# Patient Record
Sex: FEMALE | Race: WHITE | NOT HISPANIC OR LATINO | URBAN - METROPOLITAN AREA
[De-identification: names, ages, dates, MRNs, and addresses within clinical notes are randomized per-mention and may not be internally consistent; named-entity substitution may affect disease eponyms.]

---

## 2017-06-14 ENCOUNTER — OUTPATIENT (OUTPATIENT)
Dept: OUTPATIENT SERVICES | Facility: HOSPITAL | Age: 72
LOS: 1 days | Discharge: HOME | End: 2017-06-14

## 2017-06-14 DIAGNOSIS — M81.0 AGE-RELATED OSTEOPOROSIS WITHOUT CURRENT PATHOLOGICAL FRACTURE: ICD-10-CM

## 2017-06-14 DIAGNOSIS — R53.83 OTHER FATIGUE: ICD-10-CM

## 2017-06-14 DIAGNOSIS — J45.909 UNSPECIFIED ASTHMA, UNCOMPLICATED: ICD-10-CM

## 2017-06-14 DIAGNOSIS — S30.1XXA CONTUSION OF ABDOMINAL WALL, INITIAL ENCOUNTER: ICD-10-CM

## 2017-06-14 DIAGNOSIS — E78.5 HYPERLIPIDEMIA, UNSPECIFIED: ICD-10-CM

## 2017-06-14 DIAGNOSIS — K75.4 AUTOIMMUNE HEPATITIS: ICD-10-CM

## 2017-06-22 ENCOUNTER — EMERGENCY (EMERGENCY)
Facility: HOSPITAL | Age: 72
LOS: 0 days | Discharge: HOME | End: 2017-06-22
Admitting: INTERNAL MEDICINE

## 2017-06-22 DIAGNOSIS — J45.909 UNSPECIFIED ASTHMA, UNCOMPLICATED: ICD-10-CM

## 2017-06-22 DIAGNOSIS — S81.002A UNSPECIFIED OPEN WOUND, LEFT KNEE, INITIAL ENCOUNTER: ICD-10-CM

## 2017-06-22 DIAGNOSIS — Z23 ENCOUNTER FOR IMMUNIZATION: ICD-10-CM

## 2017-06-22 DIAGNOSIS — M25.511 PAIN IN RIGHT SHOULDER: ICD-10-CM

## 2017-06-22 DIAGNOSIS — S09.90XA UNSPECIFIED INJURY OF HEAD, INITIAL ENCOUNTER: ICD-10-CM

## 2017-06-22 DIAGNOSIS — S00.31XA ABRASION OF NOSE, INITIAL ENCOUNTER: ICD-10-CM

## 2017-06-22 DIAGNOSIS — S81.011A LACERATION WITHOUT FOREIGN BODY, RIGHT KNEE, INITIAL ENCOUNTER: ICD-10-CM
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
## 2017-06-22 DIAGNOSIS — E78.00 PURE HYPERCHOLESTEROLEMIA, UNSPECIFIED: ICD-10-CM

## 2017-06-22 DIAGNOSIS — K75.4 AUTOIMMUNE HEPATITIS: ICD-10-CM

## 2017-06-22 DIAGNOSIS — R53.83 OTHER FATIGUE: ICD-10-CM

## 2017-06-22 DIAGNOSIS — E78.5 HYPERLIPIDEMIA, UNSPECIFIED: ICD-10-CM
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
## 2017-06-22 DIAGNOSIS — Z79.899 OTHER LONG TERM (CURRENT) DRUG THERAPY: ICD-10-CM

## 2017-06-22 DIAGNOSIS — M81.0 AGE-RELATED OSTEOPOROSIS WITHOUT CURRENT PATHOLOGICAL FRACTURE: ICD-10-CM

## 2017-06-22 DIAGNOSIS — S30.1XXA CONTUSION OF ABDOMINAL WALL, INITIAL ENCOUNTER: ICD-10-CM

## 2017-06-27 ENCOUNTER — APPOINTMENT (OUTPATIENT)
Dept: HEMATOLOGY ONCOLOGY | Facility: CLINIC | Age: 72
End: 2017-06-27

## 2017-06-27 ENCOUNTER — OUTPATIENT (OUTPATIENT)
Dept: OUTPATIENT SERVICES | Facility: HOSPITAL | Age: 72
LOS: 1 days | Discharge: HOME | End: 2017-06-27

## 2017-06-27 VITALS
HEIGHT: 63 IN | TEMPERATURE: 96 F | DIASTOLIC BLOOD PRESSURE: 106 MMHG | WEIGHT: 176 LBS | HEART RATE: 98 BPM | SYSTOLIC BLOOD PRESSURE: 164 MMHG | RESPIRATION RATE: 12 BRPM | BODY MASS INDEX: 31.18 KG/M2

## 2017-06-27 DIAGNOSIS — M81.0 AGE-RELATED OSTEOPOROSIS WITHOUT CURRENT PATHOLOGICAL FRACTURE: ICD-10-CM

## 2017-06-27 DIAGNOSIS — J45.909 UNSPECIFIED ASTHMA, UNCOMPLICATED: ICD-10-CM

## 2017-06-27 DIAGNOSIS — R53.83 OTHER FATIGUE: ICD-10-CM

## 2017-06-27 DIAGNOSIS — E78.5 HYPERLIPIDEMIA, UNSPECIFIED: ICD-10-CM

## 2017-06-27 DIAGNOSIS — S30.1XXA CONTUSION OF ABDOMINAL WALL, INITIAL ENCOUNTER: ICD-10-CM

## 2017-06-27 DIAGNOSIS — Z86.19 PERSONAL HISTORY OF OTHER INFECTIOUS AND PARASITIC DISEASES: ICD-10-CM

## 2017-06-27 DIAGNOSIS — K75.4 AUTOIMMUNE HEPATITIS: ICD-10-CM

## 2017-06-27 LAB
BASOPHILS # BLD: 0.04 TH/MM3
BASOPHILS NFR BLD: 0.3 %
EOSINOPHIL # BLD: 0.01 TH/MM3
EOSINOPHIL NFR BLD: 0.1 %
ERYTHROCYTE [DISTWIDTH] IN BLOOD BY AUTOMATED COUNT: 16.2 %
GRANULOCYTES # BLD: 11.24 TH/MM3
GRANULOCYTES NFR BLD: 83.3 %
HCT VFR BLD AUTO: 40.1 %
HGB BLD-MCNC: 13.1 G/DL
IMM GRANULOCYTES # BLD: 0.35 TH/MM3
IMM GRANULOCYTES NFR BLD: 2.6 %
LYMPHOCYTES # BLD: 1.32 TH/MM3
LYMPHOCYTES NFR BLD: 9.8 %
MCH RBC QN AUTO: 29 PG
MCHC RBC AUTO-ENTMCNC: 32.7 G/DL
MCV RBC AUTO: 88.7 FL
MONOCYTES # BLD: 0.52 TH/MM3
MONOCYTES NFR BLD: 3.9 %
PLATELET # BLD: 304 TH/MM3
PMV BLD AUTO: 9.2 FL
RBC # BLD AUTO: 4.52 MIL/MM3
WBC # BLD: 13.48 TH/MM3

## 2017-06-27 RX ORDER — WARFARIN 2 MG/1
2 TABLET ORAL
Qty: 30 | Refills: 0 | Status: ACTIVE | COMMUNITY
Start: 2017-04-11

## 2017-06-27 RX ORDER — PANTOPRAZOLE 40 MG/1
40 TABLET, DELAYED RELEASE ORAL
Qty: 90 | Refills: 0 | Status: ACTIVE | COMMUNITY
Start: 2017-05-18

## 2017-06-27 RX ORDER — PREDNISONE 20 MG/1
20 TABLET ORAL
Qty: 6 | Refills: 0 | Status: ACTIVE | COMMUNITY
Start: 2017-03-04

## 2017-06-27 RX ORDER — FUROSEMIDE 20 MG/1
20 TABLET ORAL
Qty: 30 | Refills: 0 | Status: ACTIVE | COMMUNITY
Start: 2017-04-19

## 2017-06-27 RX ORDER — AMLODIPINE BESYLATE 2.5 MG/1
2.5 TABLET ORAL
Qty: 30 | Refills: 0 | Status: ACTIVE | COMMUNITY
Start: 2017-04-11

## 2017-06-27 RX ORDER — BUDESONIDE 3 MG/1
3 CAPSULE, COATED PELLETS ORAL
Qty: 120 | Refills: 0 | Status: ACTIVE | COMMUNITY
Start: 2017-02-24

## 2017-06-27 RX ORDER — BUDESONIDE 180 UG/1
180 AEROSOL, POWDER RESPIRATORY (INHALATION)
Qty: 1 | Refills: 0 | Status: ACTIVE | COMMUNITY
Start: 2017-03-04

## 2017-06-28 DIAGNOSIS — Z79.01 LONG TERM (CURRENT) USE OF ANTICOAGULANTS: ICD-10-CM

## 2017-06-28 DIAGNOSIS — I27.82 CHRONIC PULMONARY EMBOLISM: ICD-10-CM

## 2017-06-30 ENCOUNTER — OUTPATIENT (OUTPATIENT)
Dept: OUTPATIENT SERVICES | Facility: HOSPITAL | Age: 72
LOS: 1 days | Discharge: HOME | End: 2017-06-30

## 2017-06-30 DIAGNOSIS — M81.0 AGE-RELATED OSTEOPOROSIS WITHOUT CURRENT PATHOLOGICAL FRACTURE: ICD-10-CM

## 2017-06-30 DIAGNOSIS — Z79.01 LONG TERM (CURRENT) USE OF ANTICOAGULANTS: ICD-10-CM

## 2017-06-30 DIAGNOSIS — I27.82 CHRONIC PULMONARY EMBOLISM: ICD-10-CM

## 2017-06-30 DIAGNOSIS — R53.83 OTHER FATIGUE: ICD-10-CM

## 2017-06-30 DIAGNOSIS — S30.1XXA CONTUSION OF ABDOMINAL WALL, INITIAL ENCOUNTER: ICD-10-CM

## 2017-06-30 DIAGNOSIS — E78.5 HYPERLIPIDEMIA, UNSPECIFIED: ICD-10-CM

## 2017-06-30 DIAGNOSIS — K75.4 AUTOIMMUNE HEPATITIS: ICD-10-CM

## 2017-06-30 DIAGNOSIS — J45.909 UNSPECIFIED ASTHMA, UNCOMPLICATED: ICD-10-CM

## 2017-07-12 LAB
ALBUMIN SERPL-MCNC: 3.5 G/DL
ALBUMIN/GLOB SERPL: 1.46
ALP SERPL-CCNC: 100 IU/L
ALT SERPL-CCNC: 38 IU/L
ANION GAP SERPL CALC-SCNC: 10 MEQ/L
AST SERPL-CCNC: 28 IU/L
BILIRUB SERPL-MCNC: 0.9 MG/DL
BUN SERPL-MCNC: 23 MG/DL
BUN/CREAT SERPL: 20 %
CALCIUM SERPL-MCNC: 9.3 MG/DL
CHLORIDE SERPL-SCNC: 102 MEQ/L
CO2 SERPL-SCNC: 31 MEQ/L
CREAT SERPL-MCNC: 1.15 MG/DL
FIBRINOGEN PPP-MCNC: 635 MG/DL
GFR SERPL CREATININE-BSD FRML MDRD: 46
GLUCOSE SERPL-MCNC: 113 MG/DL
INR PPP: 2.9
INTERP & REPORT- F5LEI (NORTH): NORMAL
INTERPRETATION AND REPORT- PF2 (NORTH): NORMAL
POTASSIUM SERPL-SCNC: 3.9 MMOL/L
PROT SERPL-MCNC: 5.9 G/DL
PROTHROMBIN TIME: 31.2 SEC
SODIUM SERPL-SCNC: 143 MEQ/L
THROMBIN TIME: 17.9 SEC

## 2017-07-14 ENCOUNTER — OUTPATIENT (OUTPATIENT)
Dept: OUTPATIENT SERVICES | Facility: HOSPITAL | Age: 72
LOS: 1 days | Discharge: HOME | End: 2017-07-14

## 2017-07-14 DIAGNOSIS — M81.0 AGE-RELATED OSTEOPOROSIS WITHOUT CURRENT PATHOLOGICAL FRACTURE: ICD-10-CM

## 2017-07-14 DIAGNOSIS — S30.1XXA CONTUSION OF ABDOMINAL WALL, INITIAL ENCOUNTER: ICD-10-CM

## 2017-07-14 DIAGNOSIS — K75.4 AUTOIMMUNE HEPATITIS: ICD-10-CM

## 2017-07-14 DIAGNOSIS — I27.82 CHRONIC PULMONARY EMBOLISM: ICD-10-CM

## 2017-07-14 DIAGNOSIS — Z79.01 LONG TERM (CURRENT) USE OF ANTICOAGULANTS: ICD-10-CM

## 2017-07-14 DIAGNOSIS — J45.909 UNSPECIFIED ASTHMA, UNCOMPLICATED: ICD-10-CM

## 2017-07-14 DIAGNOSIS — R53.83 OTHER FATIGUE: ICD-10-CM

## 2017-07-14 DIAGNOSIS — E78.5 HYPERLIPIDEMIA, UNSPECIFIED: ICD-10-CM

## 2017-07-27 ENCOUNTER — OUTPATIENT (OUTPATIENT)
Dept: OUTPATIENT SERVICES | Facility: HOSPITAL | Age: 72
LOS: 1 days | Discharge: HOME | End: 2017-07-27

## 2017-07-27 ENCOUNTER — APPOINTMENT (OUTPATIENT)
Age: 72
End: 2017-07-27

## 2017-07-27 DIAGNOSIS — M81.0 AGE-RELATED OSTEOPOROSIS WITHOUT CURRENT PATHOLOGICAL FRACTURE: ICD-10-CM

## 2017-07-27 DIAGNOSIS — E78.5 HYPERLIPIDEMIA, UNSPECIFIED: ICD-10-CM

## 2017-07-27 DIAGNOSIS — R53.83 OTHER FATIGUE: ICD-10-CM

## 2017-07-27 DIAGNOSIS — S30.1XXA CONTUSION OF ABDOMINAL WALL, INITIAL ENCOUNTER: ICD-10-CM

## 2017-07-27 DIAGNOSIS — J45.909 UNSPECIFIED ASTHMA, UNCOMPLICATED: ICD-10-CM

## 2017-07-27 DIAGNOSIS — K75.4 AUTOIMMUNE HEPATITIS: ICD-10-CM

## 2017-07-27 RX ORDER — SILVER SULFADIAZINE 10 MG/G
1 CREAM TOPICAL TWICE DAILY
Qty: 400 | Refills: 0 | Status: ACTIVE | COMMUNITY
Start: 2017-07-27 | End: 1900-01-01

## 2017-07-28 ENCOUNTER — OUTPATIENT (OUTPATIENT)
Dept: OUTPATIENT SERVICES | Facility: HOSPITAL | Age: 72
LOS: 1 days | Discharge: HOME | End: 2017-07-28

## 2017-07-28 DIAGNOSIS — K75.4 AUTOIMMUNE HEPATITIS: ICD-10-CM

## 2017-07-28 DIAGNOSIS — M81.0 AGE-RELATED OSTEOPOROSIS WITHOUT CURRENT PATHOLOGICAL FRACTURE: ICD-10-CM

## 2017-07-28 DIAGNOSIS — I27.82 CHRONIC PULMONARY EMBOLISM: ICD-10-CM

## 2017-07-28 DIAGNOSIS — R53.83 OTHER FATIGUE: ICD-10-CM

## 2017-07-28 DIAGNOSIS — Z79.01 LONG TERM (CURRENT) USE OF ANTICOAGULANTS: ICD-10-CM

## 2017-07-28 DIAGNOSIS — E78.5 HYPERLIPIDEMIA, UNSPECIFIED: ICD-10-CM

## 2017-07-28 DIAGNOSIS — J45.909 UNSPECIFIED ASTHMA, UNCOMPLICATED: ICD-10-CM

## 2017-07-28 DIAGNOSIS — S30.1XXA CONTUSION OF ABDOMINAL WALL, INITIAL ENCOUNTER: ICD-10-CM
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
## 2017-08-09 DIAGNOSIS — S81.802A UNSPECIFIED OPEN WOUND, LEFT LOWER LEG, INITIAL ENCOUNTER: ICD-10-CM
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
## 2017-08-10 ENCOUNTER — APPOINTMENT (OUTPATIENT)
Age: 72
End: 2017-08-10

## 2017-08-10 ENCOUNTER — OUTPATIENT (OUTPATIENT)
Dept: OUTPATIENT SERVICES | Facility: HOSPITAL | Age: 72
LOS: 1 days | Discharge: HOME | End: 2017-08-10

## 2017-08-10 DIAGNOSIS — S30.1XXA CONTUSION OF ABDOMINAL WALL, INITIAL ENCOUNTER: ICD-10-CM

## 2017-08-10 DIAGNOSIS — M81.0 AGE-RELATED OSTEOPOROSIS WITHOUT CURRENT PATHOLOGICAL FRACTURE: ICD-10-CM

## 2017-08-10 DIAGNOSIS — E78.5 HYPERLIPIDEMIA, UNSPECIFIED: ICD-10-CM

## 2017-08-10 DIAGNOSIS — R53.83 OTHER FATIGUE: ICD-10-CM

## 2017-08-10 DIAGNOSIS — K75.4 AUTOIMMUNE HEPATITIS: ICD-10-CM

## 2017-08-10 DIAGNOSIS — J45.909 UNSPECIFIED ASTHMA, UNCOMPLICATED: ICD-10-CM

## 2017-08-11 ENCOUNTER — OUTPATIENT (OUTPATIENT)
Dept: OUTPATIENT SERVICES | Facility: HOSPITAL | Age: 72
LOS: 1 days | Discharge: HOME | End: 2017-08-11

## 2017-08-11 DIAGNOSIS — E78.5 HYPERLIPIDEMIA, UNSPECIFIED: ICD-10-CM

## 2017-08-11 DIAGNOSIS — I27.82 CHRONIC PULMONARY EMBOLISM: ICD-10-CM

## 2017-08-11 DIAGNOSIS — K75.4 AUTOIMMUNE HEPATITIS: ICD-10-CM

## 2017-08-11 DIAGNOSIS — R53.83 OTHER FATIGUE: ICD-10-CM

## 2017-08-11 DIAGNOSIS — Z79.01 LONG TERM (CURRENT) USE OF ANTICOAGULANTS: ICD-10-CM

## 2017-08-11 DIAGNOSIS — M81.0 AGE-RELATED OSTEOPOROSIS WITHOUT CURRENT PATHOLOGICAL FRACTURE: ICD-10-CM

## 2017-08-11 DIAGNOSIS — S30.1XXA CONTUSION OF ABDOMINAL WALL, INITIAL ENCOUNTER: ICD-10-CM

## 2017-08-11 DIAGNOSIS — J45.909 UNSPECIFIED ASTHMA, UNCOMPLICATED: ICD-10-CM

## 2017-08-17 ENCOUNTER — APPOINTMENT (OUTPATIENT)
Age: 72
End: 2017-08-17

## 2017-08-18 DIAGNOSIS — S81.802A UNSPECIFIED OPEN WOUND, LEFT LOWER LEG, INITIAL ENCOUNTER: ICD-10-CM
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
## 2017-08-25 ENCOUNTER — OUTPATIENT (OUTPATIENT)
Dept: OUTPATIENT SERVICES | Facility: HOSPITAL | Age: 72
LOS: 1 days | Discharge: HOME | End: 2017-08-25

## 2017-08-25 DIAGNOSIS — Z79.01 LONG TERM (CURRENT) USE OF ANTICOAGULANTS: ICD-10-CM

## 2017-08-25 DIAGNOSIS — R53.83 OTHER FATIGUE: ICD-10-CM

## 2017-08-25 DIAGNOSIS — K75.4 AUTOIMMUNE HEPATITIS: ICD-10-CM

## 2017-08-25 DIAGNOSIS — I27.82 CHRONIC PULMONARY EMBOLISM: ICD-10-CM

## 2017-08-25 DIAGNOSIS — E78.5 HYPERLIPIDEMIA, UNSPECIFIED: ICD-10-CM

## 2017-08-25 DIAGNOSIS — J45.909 UNSPECIFIED ASTHMA, UNCOMPLICATED: ICD-10-CM

## 2017-08-25 DIAGNOSIS — S30.1XXA CONTUSION OF ABDOMINAL WALL, INITIAL ENCOUNTER: ICD-10-CM

## 2017-08-25 DIAGNOSIS — M81.0 AGE-RELATED OSTEOPOROSIS WITHOUT CURRENT PATHOLOGICAL FRACTURE: ICD-10-CM

## 2017-09-07 ENCOUNTER — APPOINTMENT (OUTPATIENT)
Dept: HEMATOLOGY ONCOLOGY | Facility: CLINIC | Age: 72
End: 2017-09-07

## 2017-09-07 ENCOUNTER — OUTPATIENT (OUTPATIENT)
Dept: OUTPATIENT SERVICES | Facility: HOSPITAL | Age: 72
LOS: 1 days | Discharge: HOME | End: 2017-09-07

## 2017-09-07 VITALS
DIASTOLIC BLOOD PRESSURE: 92 MMHG | HEIGHT: 63 IN | WEIGHT: 180 LBS | SYSTOLIC BLOOD PRESSURE: 178 MMHG | BODY MASS INDEX: 31.89 KG/M2 | RESPIRATION RATE: 12 BRPM | TEMPERATURE: 97.3 F

## 2017-09-07 DIAGNOSIS — D68.69 OTHER THROMBOPHILIA: ICD-10-CM

## 2017-09-08 ENCOUNTER — OUTPATIENT (OUTPATIENT)
Dept: OUTPATIENT SERVICES | Facility: HOSPITAL | Age: 72
LOS: 1 days | Discharge: HOME | End: 2017-09-08

## 2017-09-08 DIAGNOSIS — E78.5 HYPERLIPIDEMIA, UNSPECIFIED: ICD-10-CM

## 2017-09-08 DIAGNOSIS — K75.4 AUTOIMMUNE HEPATITIS: ICD-10-CM

## 2017-09-08 DIAGNOSIS — Z79.01 LONG TERM (CURRENT) USE OF ANTICOAGULANTS: ICD-10-CM

## 2017-09-08 DIAGNOSIS — R53.83 OTHER FATIGUE: ICD-10-CM

## 2017-09-08 DIAGNOSIS — M81.0 AGE-RELATED OSTEOPOROSIS WITHOUT CURRENT PATHOLOGICAL FRACTURE: ICD-10-CM

## 2017-09-08 DIAGNOSIS — I27.82 CHRONIC PULMONARY EMBOLISM: ICD-10-CM

## 2017-09-08 DIAGNOSIS — S30.1XXA CONTUSION OF ABDOMINAL WALL, INITIAL ENCOUNTER: ICD-10-CM

## 2017-09-08 DIAGNOSIS — J45.909 UNSPECIFIED ASTHMA, UNCOMPLICATED: ICD-10-CM

## 2017-09-25 ENCOUNTER — OUTPATIENT (OUTPATIENT)
Dept: OUTPATIENT SERVICES | Facility: HOSPITAL | Age: 72
LOS: 1 days | Discharge: HOME | End: 2017-09-25

## 2017-09-25 DIAGNOSIS — J45.909 UNSPECIFIED ASTHMA, UNCOMPLICATED: ICD-10-CM

## 2017-09-25 DIAGNOSIS — Z79.01 LONG TERM (CURRENT) USE OF ANTICOAGULANTS: ICD-10-CM

## 2017-09-25 DIAGNOSIS — I27.82 CHRONIC PULMONARY EMBOLISM: ICD-10-CM

## 2017-09-25 DIAGNOSIS — K75.4 AUTOIMMUNE HEPATITIS: ICD-10-CM

## 2017-09-25 DIAGNOSIS — R53.83 OTHER FATIGUE: ICD-10-CM

## 2017-09-25 DIAGNOSIS — E78.5 HYPERLIPIDEMIA, UNSPECIFIED: ICD-10-CM

## 2017-09-25 DIAGNOSIS — M81.0 AGE-RELATED OSTEOPOROSIS WITHOUT CURRENT PATHOLOGICAL FRACTURE: ICD-10-CM

## 2017-09-25 DIAGNOSIS — S30.1XXA CONTUSION OF ABDOMINAL WALL, INITIAL ENCOUNTER: ICD-10-CM

## 2017-10-02 ENCOUNTER — OUTPATIENT (OUTPATIENT)
Dept: OUTPATIENT SERVICES | Facility: HOSPITAL | Age: 72
LOS: 1 days | Discharge: HOME | End: 2017-10-02

## 2017-10-02 DIAGNOSIS — E78.5 HYPERLIPIDEMIA, UNSPECIFIED: ICD-10-CM

## 2017-10-02 DIAGNOSIS — S30.1XXA CONTUSION OF ABDOMINAL WALL, INITIAL ENCOUNTER: ICD-10-CM

## 2017-10-02 DIAGNOSIS — M81.0 AGE-RELATED OSTEOPOROSIS WITHOUT CURRENT PATHOLOGICAL FRACTURE: ICD-10-CM

## 2017-10-02 DIAGNOSIS — J45.909 UNSPECIFIED ASTHMA, UNCOMPLICATED: ICD-10-CM

## 2017-10-02 DIAGNOSIS — K75.4 AUTOIMMUNE HEPATITIS: ICD-10-CM

## 2017-10-02 DIAGNOSIS — R53.83 OTHER FATIGUE: ICD-10-CM

## 2017-10-02 DIAGNOSIS — Z79.01 LONG TERM (CURRENT) USE OF ANTICOAGULANTS: ICD-10-CM

## 2017-10-02 DIAGNOSIS — I27.82 CHRONIC PULMONARY EMBOLISM: ICD-10-CM

## 2017-10-10 ENCOUNTER — OUTPATIENT (OUTPATIENT)
Dept: OUTPATIENT SERVICES | Facility: HOSPITAL | Age: 72
LOS: 1 days | Discharge: HOME | End: 2017-10-10

## 2017-10-10 DIAGNOSIS — K75.4 AUTOIMMUNE HEPATITIS: ICD-10-CM

## 2017-10-10 DIAGNOSIS — I27.82 CHRONIC PULMONARY EMBOLISM: ICD-10-CM

## 2017-10-10 DIAGNOSIS — R53.83 OTHER FATIGUE: ICD-10-CM

## 2017-10-10 DIAGNOSIS — E78.5 HYPERLIPIDEMIA, UNSPECIFIED: ICD-10-CM

## 2017-10-10 DIAGNOSIS — J45.909 UNSPECIFIED ASTHMA, UNCOMPLICATED: ICD-10-CM

## 2017-10-10 DIAGNOSIS — M81.0 AGE-RELATED OSTEOPOROSIS WITHOUT CURRENT PATHOLOGICAL FRACTURE: ICD-10-CM

## 2017-10-10 DIAGNOSIS — S30.1XXA CONTUSION OF ABDOMINAL WALL, INITIAL ENCOUNTER: ICD-10-CM

## 2017-10-10 DIAGNOSIS — Z79.01 LONG TERM (CURRENT) USE OF ANTICOAGULANTS: ICD-10-CM

## 2017-10-18 ENCOUNTER — OUTPATIENT (OUTPATIENT)
Dept: OUTPATIENT SERVICES | Facility: HOSPITAL | Age: 72
LOS: 1 days | Discharge: HOME | End: 2017-10-18

## 2017-10-18 DIAGNOSIS — I27.82 CHRONIC PULMONARY EMBOLISM: ICD-10-CM

## 2017-10-18 DIAGNOSIS — M81.0 AGE-RELATED OSTEOPOROSIS WITHOUT CURRENT PATHOLOGICAL FRACTURE: ICD-10-CM

## 2017-10-18 DIAGNOSIS — E78.5 HYPERLIPIDEMIA, UNSPECIFIED: ICD-10-CM

## 2017-10-18 DIAGNOSIS — S30.1XXA CONTUSION OF ABDOMINAL WALL, INITIAL ENCOUNTER: ICD-10-CM

## 2017-10-18 DIAGNOSIS — J45.909 UNSPECIFIED ASTHMA, UNCOMPLICATED: ICD-10-CM

## 2017-10-18 DIAGNOSIS — Z79.01 LONG TERM (CURRENT) USE OF ANTICOAGULANTS: ICD-10-CM

## 2017-10-18 DIAGNOSIS — R53.83 OTHER FATIGUE: ICD-10-CM

## 2017-10-18 DIAGNOSIS — K75.4 AUTOIMMUNE HEPATITIS: ICD-10-CM

## 2017-10-26 ENCOUNTER — OUTPATIENT (OUTPATIENT)
Dept: OUTPATIENT SERVICES | Facility: HOSPITAL | Age: 72
LOS: 1 days | Discharge: HOME | End: 2017-10-26

## 2017-10-26 DIAGNOSIS — M81.0 AGE-RELATED OSTEOPOROSIS WITHOUT CURRENT PATHOLOGICAL FRACTURE: ICD-10-CM

## 2017-10-26 DIAGNOSIS — Z79.01 LONG TERM (CURRENT) USE OF ANTICOAGULANTS: ICD-10-CM

## 2017-10-26 DIAGNOSIS — I27.82 CHRONIC PULMONARY EMBOLISM: ICD-10-CM

## 2017-10-26 DIAGNOSIS — K75.4 AUTOIMMUNE HEPATITIS: ICD-10-CM

## 2017-10-26 DIAGNOSIS — J45.909 UNSPECIFIED ASTHMA, UNCOMPLICATED: ICD-10-CM

## 2017-10-26 DIAGNOSIS — S30.1XXA CONTUSION OF ABDOMINAL WALL, INITIAL ENCOUNTER: ICD-10-CM

## 2017-10-26 DIAGNOSIS — E78.5 HYPERLIPIDEMIA, UNSPECIFIED: ICD-10-CM

## 2017-10-26 DIAGNOSIS — R53.83 OTHER FATIGUE: ICD-10-CM

## 2017-11-02 ENCOUNTER — OUTPATIENT (OUTPATIENT)
Dept: OUTPATIENT SERVICES | Facility: HOSPITAL | Age: 72
LOS: 1 days | Discharge: HOME | End: 2017-11-02

## 2017-11-02 DIAGNOSIS — K75.4 AUTOIMMUNE HEPATITIS: ICD-10-CM

## 2017-11-02 DIAGNOSIS — I27.82 CHRONIC PULMONARY EMBOLISM: ICD-10-CM

## 2017-11-02 DIAGNOSIS — Z79.01 LONG TERM (CURRENT) USE OF ANTICOAGULANTS: ICD-10-CM

## 2017-11-02 DIAGNOSIS — R53.83 OTHER FATIGUE: ICD-10-CM

## 2017-11-02 DIAGNOSIS — S30.1XXA CONTUSION OF ABDOMINAL WALL, INITIAL ENCOUNTER: ICD-10-CM

## 2017-11-02 DIAGNOSIS — J45.909 UNSPECIFIED ASTHMA, UNCOMPLICATED: ICD-10-CM

## 2017-11-02 DIAGNOSIS — E78.5 HYPERLIPIDEMIA, UNSPECIFIED: ICD-10-CM

## 2017-11-02 DIAGNOSIS — M81.0 AGE-RELATED OSTEOPOROSIS WITHOUT CURRENT PATHOLOGICAL FRACTURE: ICD-10-CM

## 2017-11-03 ENCOUNTER — OUTPATIENT (OUTPATIENT)
Dept: OUTPATIENT SERVICES | Facility: HOSPITAL | Age: 72
LOS: 1 days | Discharge: HOME | End: 2017-11-03

## 2017-11-03 DIAGNOSIS — Z12.31 ENCOUNTER FOR SCREENING MAMMOGRAM FOR MALIGNANT NEOPLASM OF BREAST: ICD-10-CM

## 2017-11-03 DIAGNOSIS — R53.83 OTHER FATIGUE: ICD-10-CM

## 2017-11-03 DIAGNOSIS — S30.1XXA CONTUSION OF ABDOMINAL WALL, INITIAL ENCOUNTER: ICD-10-CM

## 2017-11-03 DIAGNOSIS — K75.4 AUTOIMMUNE HEPATITIS: ICD-10-CM

## 2017-11-03 DIAGNOSIS — E78.5 HYPERLIPIDEMIA, UNSPECIFIED: ICD-10-CM

## 2017-11-03 DIAGNOSIS — M81.0 AGE-RELATED OSTEOPOROSIS WITHOUT CURRENT PATHOLOGICAL FRACTURE: ICD-10-CM

## 2017-11-03 DIAGNOSIS — J45.909 UNSPECIFIED ASTHMA, UNCOMPLICATED: ICD-10-CM

## 2017-11-16 ENCOUNTER — OUTPATIENT (OUTPATIENT)
Dept: OUTPATIENT SERVICES | Facility: HOSPITAL | Age: 72
LOS: 1 days | Discharge: HOME | End: 2017-11-16

## 2017-11-16 DIAGNOSIS — S30.1XXA CONTUSION OF ABDOMINAL WALL, INITIAL ENCOUNTER: ICD-10-CM

## 2017-11-16 DIAGNOSIS — I27.82 CHRONIC PULMONARY EMBOLISM: ICD-10-CM

## 2017-11-16 DIAGNOSIS — R53.83 OTHER FATIGUE: ICD-10-CM

## 2017-11-16 DIAGNOSIS — J45.909 UNSPECIFIED ASTHMA, UNCOMPLICATED: ICD-10-CM

## 2017-11-16 DIAGNOSIS — M81.0 AGE-RELATED OSTEOPOROSIS WITHOUT CURRENT PATHOLOGICAL FRACTURE: ICD-10-CM

## 2017-11-16 DIAGNOSIS — Z79.01 LONG TERM (CURRENT) USE OF ANTICOAGULANTS: ICD-10-CM

## 2017-11-16 DIAGNOSIS — E78.5 HYPERLIPIDEMIA, UNSPECIFIED: ICD-10-CM

## 2017-11-16 DIAGNOSIS — K75.4 AUTOIMMUNE HEPATITIS: ICD-10-CM

## 2017-11-30 ENCOUNTER — OUTPATIENT (OUTPATIENT)
Dept: OUTPATIENT SERVICES | Facility: HOSPITAL | Age: 72
LOS: 1 days | Discharge: HOME | End: 2017-11-30

## 2017-11-30 DIAGNOSIS — J45.909 UNSPECIFIED ASTHMA, UNCOMPLICATED: ICD-10-CM

## 2017-11-30 DIAGNOSIS — R53.83 OTHER FATIGUE: ICD-10-CM

## 2017-11-30 DIAGNOSIS — Z79.01 LONG TERM (CURRENT) USE OF ANTICOAGULANTS: ICD-10-CM

## 2017-11-30 DIAGNOSIS — S30.1XXA CONTUSION OF ABDOMINAL WALL, INITIAL ENCOUNTER: ICD-10-CM

## 2017-11-30 DIAGNOSIS — I27.82 CHRONIC PULMONARY EMBOLISM: ICD-10-CM

## 2017-11-30 DIAGNOSIS — M81.0 AGE-RELATED OSTEOPOROSIS WITHOUT CURRENT PATHOLOGICAL FRACTURE: ICD-10-CM

## 2017-11-30 DIAGNOSIS — K75.4 AUTOIMMUNE HEPATITIS: ICD-10-CM

## 2017-11-30 DIAGNOSIS — E78.5 HYPERLIPIDEMIA, UNSPECIFIED: ICD-10-CM

## 2017-12-14 ENCOUNTER — OUTPATIENT (OUTPATIENT)
Dept: OUTPATIENT SERVICES | Facility: HOSPITAL | Age: 72
LOS: 1 days | Discharge: HOME | End: 2017-12-14

## 2017-12-14 DIAGNOSIS — E78.5 HYPERLIPIDEMIA, UNSPECIFIED: ICD-10-CM

## 2017-12-14 DIAGNOSIS — Z79.01 LONG TERM (CURRENT) USE OF ANTICOAGULANTS: ICD-10-CM

## 2017-12-14 DIAGNOSIS — J45.909 UNSPECIFIED ASTHMA, UNCOMPLICATED: ICD-10-CM

## 2017-12-14 DIAGNOSIS — K75.4 AUTOIMMUNE HEPATITIS: ICD-10-CM

## 2017-12-14 DIAGNOSIS — I27.82 CHRONIC PULMONARY EMBOLISM: ICD-10-CM

## 2017-12-14 DIAGNOSIS — S30.1XXA CONTUSION OF ABDOMINAL WALL, INITIAL ENCOUNTER: ICD-10-CM

## 2017-12-14 DIAGNOSIS — M81.0 AGE-RELATED OSTEOPOROSIS WITHOUT CURRENT PATHOLOGICAL FRACTURE: ICD-10-CM

## 2017-12-14 DIAGNOSIS — R53.83 OTHER FATIGUE: ICD-10-CM

## 2017-12-28 ENCOUNTER — OUTPATIENT (OUTPATIENT)
Dept: OUTPATIENT SERVICES | Facility: HOSPITAL | Age: 72
LOS: 1 days | Discharge: HOME | End: 2017-12-28

## 2017-12-28 DIAGNOSIS — J45.909 UNSPECIFIED ASTHMA, UNCOMPLICATED: ICD-10-CM

## 2017-12-28 DIAGNOSIS — S30.1XXA CONTUSION OF ABDOMINAL WALL, INITIAL ENCOUNTER: ICD-10-CM

## 2017-12-28 DIAGNOSIS — I27.82 CHRONIC PULMONARY EMBOLISM: ICD-10-CM

## 2017-12-28 DIAGNOSIS — E78.5 HYPERLIPIDEMIA, UNSPECIFIED: ICD-10-CM

## 2017-12-28 DIAGNOSIS — Z79.01 LONG TERM (CURRENT) USE OF ANTICOAGULANTS: ICD-10-CM

## 2017-12-28 DIAGNOSIS — K75.4 AUTOIMMUNE HEPATITIS: ICD-10-CM

## 2017-12-28 DIAGNOSIS — R53.83 OTHER FATIGUE: ICD-10-CM

## 2017-12-28 DIAGNOSIS — M81.0 AGE-RELATED OSTEOPOROSIS WITHOUT CURRENT PATHOLOGICAL FRACTURE: ICD-10-CM

## 2018-01-11 ENCOUNTER — OUTPATIENT (OUTPATIENT)
Dept: OUTPATIENT SERVICES | Facility: HOSPITAL | Age: 73
LOS: 1 days | Discharge: HOME | End: 2018-01-11

## 2018-01-11 DIAGNOSIS — E78.5 HYPERLIPIDEMIA, UNSPECIFIED: ICD-10-CM

## 2018-01-11 DIAGNOSIS — R53.83 OTHER FATIGUE: ICD-10-CM

## 2018-01-11 DIAGNOSIS — J45.909 UNSPECIFIED ASTHMA, UNCOMPLICATED: ICD-10-CM

## 2018-01-11 DIAGNOSIS — M81.0 AGE-RELATED OSTEOPOROSIS WITHOUT CURRENT PATHOLOGICAL FRACTURE: ICD-10-CM

## 2018-01-11 DIAGNOSIS — I27.82 CHRONIC PULMONARY EMBOLISM: ICD-10-CM

## 2018-01-11 DIAGNOSIS — Z79.01 LONG TERM (CURRENT) USE OF ANTICOAGULANTS: ICD-10-CM

## 2018-01-11 DIAGNOSIS — S30.1XXA CONTUSION OF ABDOMINAL WALL, INITIAL ENCOUNTER: ICD-10-CM

## 2018-01-11 DIAGNOSIS — K75.4 AUTOIMMUNE HEPATITIS: ICD-10-CM

## 2018-01-25 ENCOUNTER — OUTPATIENT (OUTPATIENT)
Dept: OUTPATIENT SERVICES | Facility: HOSPITAL | Age: 73
LOS: 1 days | Discharge: HOME | End: 2018-01-25

## 2018-01-25 DIAGNOSIS — Z79.01 LONG TERM (CURRENT) USE OF ANTICOAGULANTS: ICD-10-CM

## 2018-01-25 DIAGNOSIS — I27.82 CHRONIC PULMONARY EMBOLISM: ICD-10-CM

## 2018-02-04 DIAGNOSIS — J45.909 UNSPECIFIED ASTHMA, UNCOMPLICATED: ICD-10-CM

## 2018-02-04 DIAGNOSIS — K75.4 AUTOIMMUNE HEPATITIS: ICD-10-CM

## 2018-02-04 DIAGNOSIS — M81.0 AGE-RELATED OSTEOPOROSIS WITHOUT CURRENT PATHOLOGICAL FRACTURE: ICD-10-CM

## 2018-02-04 DIAGNOSIS — R53.83 OTHER FATIGUE: ICD-10-CM

## 2018-02-04 DIAGNOSIS — E78.5 HYPERLIPIDEMIA, UNSPECIFIED: ICD-10-CM

## 2018-02-04 DIAGNOSIS — S30.1XXA CONTUSION OF ABDOMINAL WALL, INITIAL ENCOUNTER: ICD-10-CM

## 2018-02-15 ENCOUNTER — OUTPATIENT (OUTPATIENT)
Dept: OUTPATIENT SERVICES | Facility: HOSPITAL | Age: 73
LOS: 1 days | Discharge: HOME | End: 2018-02-15

## 2018-02-15 DIAGNOSIS — I27.82 CHRONIC PULMONARY EMBOLISM: ICD-10-CM

## 2018-02-15 DIAGNOSIS — Z79.01 LONG TERM (CURRENT) USE OF ANTICOAGULANTS: ICD-10-CM

## 2018-02-19 ENCOUNTER — EMERGENCY (EMERGENCY)
Facility: HOSPITAL | Age: 73
LOS: 0 days | Discharge: HOME | End: 2018-02-19
Attending: EMERGENCY MEDICINE

## 2018-02-19 VITALS
SYSTOLIC BLOOD PRESSURE: 179 MMHG | TEMPERATURE: 96 F | HEART RATE: 69 BPM | WEIGHT: 169.98 LBS | HEIGHT: 65 IN | DIASTOLIC BLOOD PRESSURE: 92 MMHG | OXYGEN SATURATION: 97 % | RESPIRATION RATE: 18 BRPM

## 2018-02-19 DIAGNOSIS — S81.811A LACERATION WITHOUT FOREIGN BODY, RIGHT LOWER LEG, INITIAL ENCOUNTER: ICD-10-CM

## 2018-02-19 DIAGNOSIS — R07.81 PLEURODYNIA: ICD-10-CM
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
## 2018-02-19 DIAGNOSIS — R10.11 RIGHT UPPER QUADRANT PAIN: ICD-10-CM
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
## 2018-02-19 DIAGNOSIS — Z79.01 LONG TERM (CURRENT) USE OF ANTICOAGULANTS: ICD-10-CM

## 2018-02-19 DIAGNOSIS — Z86.19 PERSONAL HISTORY OF OTHER INFECTIOUS AND PARASITIC DISEASES: ICD-10-CM
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
## 2018-02-19 LAB
ALBUMIN SERPL ELPH-MCNC: 3 G/DL — SIGNIFICANT CHANGE UP (ref 3–5.5)
ALP SERPL-CCNC: 69 U/L — SIGNIFICANT CHANGE UP (ref 30–115)
ALT FLD-CCNC: 20 U/L — SIGNIFICANT CHANGE UP (ref 0–41)
ANION GAP SERPL CALC-SCNC: 10 MMOL/L — SIGNIFICANT CHANGE UP (ref 7–14)
APTT BLD: 28.4 SEC — SIGNIFICANT CHANGE UP (ref 27–39.2)
AST SERPL-CCNC: 28 U/L — SIGNIFICANT CHANGE UP (ref 0–41)
BASOPHILS # BLD AUTO: 0.02 K/UL — SIGNIFICANT CHANGE UP (ref 0–0.2)
BASOPHILS NFR BLD AUTO: 0.2 % — SIGNIFICANT CHANGE UP (ref 0–1)
BILIRUB SERPL-MCNC: 0.9 MG/DL — SIGNIFICANT CHANGE UP (ref 0.2–1.2)
BUN SERPL-MCNC: 30 MG/DL — HIGH (ref 10–20)
CALCIUM SERPL-MCNC: 8.5 MG/DL — SIGNIFICANT CHANGE UP (ref 8.5–10.1)
CHLORIDE SERPL-SCNC: 103 MMOL/L — SIGNIFICANT CHANGE UP (ref 98–110)
CO2 SERPL-SCNC: 25 MMOL/L — SIGNIFICANT CHANGE UP (ref 17–32)
CREAT SERPL-MCNC: 1.5 MG/DL — SIGNIFICANT CHANGE UP (ref 0.7–1.5)
EOSINOPHIL # BLD AUTO: 0.1 K/UL — SIGNIFICANT CHANGE UP (ref 0–0.7)
EOSINOPHIL NFR BLD AUTO: 0.9 % — SIGNIFICANT CHANGE UP (ref 0–8)
GLUCOSE SERPL-MCNC: 175 MG/DL — HIGH (ref 70–110)
HCT VFR BLD CALC: 35.1 % — LOW (ref 37–47)
HGB BLD-MCNC: 10.4 G/DL — LOW (ref 14–18)
IMM GRANULOCYTES NFR BLD AUTO: 1.7 % — HIGH (ref 0.1–0.3)
INR BLD: 2.65 RATIO — HIGH (ref 0.65–1.3)
LACTATE SERPL-SCNC: 2.4 MMOL/L — HIGH (ref 0.5–2.2)
LIDOCAIN IGE QN: 38 U/L — SIGNIFICANT CHANGE UP (ref 7–60)
LYMPHOCYTES # BLD AUTO: 1.95 K/UL — SIGNIFICANT CHANGE UP (ref 1.2–3.4)
LYMPHOCYTES # BLD AUTO: 17 % — LOW (ref 20.5–51.1)
MCHC RBC-ENTMCNC: 22.2 PG — LOW (ref 27–31)
MCHC RBC-ENTMCNC: 29.6 G/DL — LOW (ref 32–37)
MCV RBC AUTO: 75 FL — LOW (ref 81–91)
MONOCYTES # BLD AUTO: 0.71 K/UL — HIGH (ref 0.1–0.6)
MONOCYTES NFR BLD AUTO: 6.2 % — SIGNIFICANT CHANGE UP (ref 1.7–9.3)
NEUTROPHILS # BLD AUTO: 8.47 K/UL — HIGH (ref 1.4–6.5)
NEUTROPHILS NFR BLD AUTO: 74 % — SIGNIFICANT CHANGE UP (ref 42.2–75.2)
PLATELET # BLD AUTO: 340 K/UL — SIGNIFICANT CHANGE UP (ref 130–400)
POTASSIUM SERPL-MCNC: 3.7 MMOL/L — SIGNIFICANT CHANGE UP (ref 3.5–5)
POTASSIUM SERPL-SCNC: 3.7 MMOL/L — SIGNIFICANT CHANGE UP (ref 3.5–5)
PROT SERPL-MCNC: 6 G/DL — SIGNIFICANT CHANGE UP (ref 6–8)
PROTHROM AB SERPL-ACNC: 30.1 SEC — HIGH (ref 9.95–12.87)
RBC # BLD: 4.68 M/UL — SIGNIFICANT CHANGE UP (ref 4.2–5.4)
RBC # FLD: 17.4 % — HIGH (ref 11.5–14.5)
SODIUM SERPL-SCNC: 138 MMOL/L — SIGNIFICANT CHANGE UP (ref 135–146)
WBC # BLD: 11.44 K/UL — HIGH (ref 4.8–10.8)
WBC # FLD AUTO: 11.44 K/UL — HIGH (ref 4.8–10.8)

## 2018-02-19 NOTE — ED PROVIDER NOTE - MEDICAL DECISION MAKING DETAILS
s/p fall. Pt c/o hitting head, right flank and left shin. VS noted. Mild scalp tenderness, neck NT, right chest wall tenderness, equal breath sounds,mild RUQ tenderness, extensive lac to anterior lower leg, n/v intact. XR and CT rev’d. Results d/w pt. Notably I discussed presence of single pulmonary nodule and the need to f/u. All copies of diagnostic testing was provided to patient to aid in f/u

## 2018-02-19 NOTE — ED PROVIDER NOTE - PHYSICAL EXAMINATION
AOx4, Non toxic appearing, NAD. . PERRLA/EOM, conjunctiva and sclera clear. MM moist, no nasal discharge.  Pharynx and TM's unremarkable. Neck supple nt, no meningeal signs. no c-spine ttp. Heart RRR s1s2 nl, no rub/murmur. Lungs- BS equal, CTAB. Abdomen soft ntnd no r/g. +right sided lower rib ttp.   Extremities- moves all, +equal distal pulses, sensation wnl, normal ROM. No LE edema, calves nttp b/l.  +7cm laceration to right shin no active bleeding nv intact distally.

## 2018-02-19 NOTE — ED PROVIDER NOTE - OBJECTIVE STATEMENT
3 yo F pmh noted p/w complaints of right abd and right leg pain after mechanical fall on ice his morning, denies prodromal symptoms prior. no n/v/d/f/c, headache, neck pain, cp or sob. pt on coumadin for LLE DVT

## 2018-02-19 NOTE — ED PROVIDER NOTE - NS ED ROS FT
Pt denied fvr/chills, HA, visual changes, dizziness, light headedness, presyncope, LOC, CP, LAND, PND, SOB, cough, hemoptysis, n/v/d, changes in bowel or bladder habits, LE edema, numbness, weakness,.  The pt also denied recent travel outside of the country, recent illnesses, sick contacts, recent airplane trips or periods of immobility/bedbound.

## 2018-02-27 ENCOUNTER — INPATIENT (INPATIENT)
Facility: HOSPITAL | Age: 73
LOS: 23 days | Discharge: NURSING, REHAB OR LONG-TERM CARE | End: 2018-03-23
Attending: INTERNAL MEDICINE | Admitting: INTERNAL MEDICINE

## 2018-02-27 VITALS
TEMPERATURE: 96 F | SYSTOLIC BLOOD PRESSURE: 185 MMHG | DIASTOLIC BLOOD PRESSURE: 83 MMHG | HEART RATE: 107 BPM | RESPIRATION RATE: 18 BRPM | OXYGEN SATURATION: 97 %

## 2018-02-27 LAB
ALBUMIN SERPL ELPH-MCNC: 3.2 G/DL — SIGNIFICANT CHANGE UP (ref 3–5.5)
ALP SERPL-CCNC: 98 U/L — SIGNIFICANT CHANGE UP (ref 30–115)
ALT FLD-CCNC: 25 U/L — SIGNIFICANT CHANGE UP (ref 0–41)
ANION GAP SERPL CALC-SCNC: 13 MMOL/L — SIGNIFICANT CHANGE UP (ref 7–14)
APTT BLD: 30.2 SEC — SIGNIFICANT CHANGE UP (ref 27–39.2)
AST SERPL-CCNC: 25 U/L — SIGNIFICANT CHANGE UP (ref 0–41)
BASOPHILS # BLD AUTO: 0.02 K/UL — SIGNIFICANT CHANGE UP (ref 0–0.2)
BASOPHILS NFR BLD AUTO: 0.1 % — SIGNIFICANT CHANGE UP (ref 0–1)
BILIRUB SERPL-MCNC: 0.9 MG/DL — SIGNIFICANT CHANGE UP (ref 0.2–1.2)
BUN SERPL-MCNC: 17 MG/DL — SIGNIFICANT CHANGE UP (ref 10–20)
CALCIUM SERPL-MCNC: 9.1 MG/DL — SIGNIFICANT CHANGE UP (ref 8.5–10.1)
CHLORIDE SERPL-SCNC: 100 MMOL/L — SIGNIFICANT CHANGE UP (ref 98–110)
CO2 SERPL-SCNC: 28 MMOL/L — SIGNIFICANT CHANGE UP (ref 17–32)
CREAT SERPL-MCNC: 1.3 MG/DL — SIGNIFICANT CHANGE UP (ref 0.7–1.5)
EOSINOPHIL # BLD AUTO: 0.05 K/UL — SIGNIFICANT CHANGE UP (ref 0–0.7)
EOSINOPHIL NFR BLD AUTO: 0.3 % — SIGNIFICANT CHANGE UP (ref 0–8)
GLUCOSE SERPL-MCNC: 138 MG/DL — HIGH (ref 70–110)
HCT VFR BLD CALC: 35.6 % — LOW (ref 37–47)
HGB BLD-MCNC: 10.6 G/DL — LOW (ref 14–18)
IMM GRANULOCYTES NFR BLD AUTO: 1.3 % — HIGH (ref 0.1–0.3)
INR BLD: 3.83 RATIO — HIGH (ref 0.65–1.3)
LYMPHOCYTES # BLD AUTO: 1.21 K/UL — SIGNIFICANT CHANGE UP (ref 1.2–3.4)
LYMPHOCYTES # BLD AUTO: 7.6 % — LOW (ref 20.5–51.1)
MCHC RBC-ENTMCNC: 21.9 PG — LOW (ref 27–31)
MCHC RBC-ENTMCNC: 29.8 G/DL — LOW (ref 32–37)
MCV RBC AUTO: 73.7 FL — LOW (ref 81–91)
MONOCYTES # BLD AUTO: 0.65 K/UL — HIGH (ref 0.1–0.6)
MONOCYTES NFR BLD AUTO: 4.1 % — SIGNIFICANT CHANGE UP (ref 1.7–9.3)
NEUTROPHILS # BLD AUTO: 13.8 K/UL — HIGH (ref 1.4–6.5)
NEUTROPHILS NFR BLD AUTO: 86.6 % — HIGH (ref 42.2–75.2)
PLATELET # BLD AUTO: 464 K/UL — HIGH (ref 130–400)
POTASSIUM SERPL-MCNC: 3.6 MMOL/L — SIGNIFICANT CHANGE UP (ref 3.5–5)
POTASSIUM SERPL-SCNC: 3.6 MMOL/L — SIGNIFICANT CHANGE UP (ref 3.5–5)
PROT SERPL-MCNC: 6.5 G/DL — SIGNIFICANT CHANGE UP (ref 6–8)
PROTHROM AB SERPL-ACNC: >40 SEC — HIGH (ref 9.95–12.87)
RBC # BLD: 4.83 M/UL — SIGNIFICANT CHANGE UP (ref 4.2–5.4)
RBC # FLD: 17.7 % — HIGH (ref 11.5–14.5)
SODIUM SERPL-SCNC: 141 MMOL/L — SIGNIFICANT CHANGE UP (ref 135–146)
TYPE + AB SCN PNL BLD: SIGNIFICANT CHANGE UP
WBC # BLD: 15.93 K/UL — HIGH (ref 4.8–10.8)
WBC # FLD AUTO: 15.93 K/UL — HIGH (ref 4.8–10.8)

## 2018-02-27 RX ORDER — ACETAMINOPHEN 500 MG
650 TABLET ORAL EVERY 6 HOURS
Qty: 0 | Refills: 0 | Status: DISCONTINUED | OUTPATIENT
Start: 2018-02-27 | End: 2018-02-28

## 2018-02-27 RX ORDER — POLYETHYLENE GLYCOL 3350 17 G/17G
17 POWDER, FOR SOLUTION ORAL DAILY
Qty: 0 | Refills: 0 | Status: DISCONTINUED | OUTPATIENT
Start: 2018-02-27 | End: 2018-02-28

## 2018-02-27 RX ORDER — METOPROLOL TARTRATE 50 MG
50 TABLET ORAL DAILY
Qty: 0 | Refills: 0 | Status: DISCONTINUED | OUTPATIENT
Start: 2018-02-27 | End: 2018-02-28

## 2018-02-27 RX ORDER — AMPICILLIN SODIUM AND SULBACTAM SODIUM 250; 125 MG/ML; MG/ML
3 INJECTION, POWDER, FOR SUSPENSION INTRAMUSCULAR; INTRAVENOUS EVERY 6 HOURS
Qty: 0 | Refills: 0 | Status: DISCONTINUED | OUTPATIENT
Start: 2018-02-27 | End: 2018-02-28

## 2018-02-27 RX ORDER — TACROLIMUS 5 MG/1
0.5 CAPSULE ORAL AT BEDTIME
Qty: 0 | Refills: 0 | Status: DISCONTINUED | OUTPATIENT
Start: 2018-02-27 | End: 2018-02-28

## 2018-02-27 RX ORDER — TACROLIMUS 5 MG/1
1 CAPSULE ORAL
Qty: 0 | Refills: 0 | COMMUNITY

## 2018-02-27 RX ORDER — TACROLIMUS 5 MG/1
1 CAPSULE ORAL DAILY
Qty: 0 | Refills: 0 | Status: DISCONTINUED | OUTPATIENT
Start: 2018-02-27 | End: 2018-02-28

## 2018-02-27 RX ORDER — TACROLIMUS 5 MG/1
0.5 CAPSULE ORAL AT BEDTIME
Qty: 0 | Refills: 0 | Status: DISCONTINUED | OUTPATIENT
Start: 2018-02-27 | End: 2018-02-27

## 2018-02-27 RX ORDER — AMLODIPINE BESYLATE 2.5 MG/1
10 TABLET ORAL EVERY 24 HOURS
Qty: 0 | Refills: 0 | Status: DISCONTINUED | OUTPATIENT
Start: 2018-02-27 | End: 2018-02-28

## 2018-02-27 RX ORDER — AMPICILLIN SODIUM AND SULBACTAM SODIUM 250; 125 MG/ML; MG/ML
3 INJECTION, POWDER, FOR SUSPENSION INTRAMUSCULAR; INTRAVENOUS ONCE
Qty: 0 | Refills: 0 | Status: COMPLETED | OUTPATIENT
Start: 2018-02-27 | End: 2018-02-27

## 2018-02-27 RX ORDER — PANTOPRAZOLE SODIUM 20 MG/1
40 TABLET, DELAYED RELEASE ORAL DAILY
Qty: 0 | Refills: 0 | Status: DISCONTINUED | OUTPATIENT
Start: 2018-02-27 | End: 2018-02-28

## 2018-02-27 RX ORDER — VANCOMYCIN HCL 1 G
1000 VIAL (EA) INTRAVENOUS ONCE
Qty: 0 | Refills: 0 | Status: COMPLETED | OUTPATIENT
Start: 2018-02-27 | End: 2018-02-27

## 2018-02-27 RX ORDER — SENNA PLUS 8.6 MG/1
2 TABLET ORAL AT BEDTIME
Qty: 0 | Refills: 0 | Status: DISCONTINUED | OUTPATIENT
Start: 2018-02-27 | End: 2018-02-28

## 2018-02-27 RX ORDER — SODIUM CHLORIDE 9 MG/ML
1000 INJECTION, SOLUTION INTRAVENOUS
Qty: 0 | Refills: 0 | Status: DISCONTINUED | OUTPATIENT
Start: 2018-02-27 | End: 2018-02-28

## 2018-02-27 RX ORDER — DOCUSATE SODIUM 100 MG
100 CAPSULE ORAL THREE TIMES A DAY
Qty: 0 | Refills: 0 | Status: DISCONTINUED | OUTPATIENT
Start: 2018-02-27 | End: 2018-02-28

## 2018-02-27 RX ORDER — HEPARIN SODIUM 5000 [USP'U]/ML
5000 INJECTION INTRAVENOUS; SUBCUTANEOUS DAILY
Qty: 0 | Refills: 0 | Status: DISCONTINUED | OUTPATIENT
Start: 2018-02-27 | End: 2018-02-27

## 2018-02-27 RX ORDER — SODIUM CHLORIDE 9 MG/ML
1000 INJECTION INTRAMUSCULAR; INTRAVENOUS; SUBCUTANEOUS ONCE
Qty: 0 | Refills: 0 | Status: COMPLETED | OUTPATIENT
Start: 2018-02-27 | End: 2018-02-27

## 2018-02-27 RX ORDER — CIPROFLOXACIN LACTATE 400MG/40ML
500 VIAL (ML) INTRAVENOUS EVERY 12 HOURS
Qty: 0 | Refills: 0 | Status: DISCONTINUED | OUTPATIENT
Start: 2018-02-27 | End: 2018-02-28

## 2018-02-27 RX ORDER — SODIUM CHLORIDE 9 MG/ML
1000 INJECTION INTRAMUSCULAR; INTRAVENOUS; SUBCUTANEOUS
Qty: 0 | Refills: 0 | Status: DISCONTINUED | OUTPATIENT
Start: 2018-02-27 | End: 2018-02-27

## 2018-02-27 RX ORDER — HEPARIN SODIUM 5000 [USP'U]/ML
5000 INJECTION INTRAVENOUS; SUBCUTANEOUS EVERY 8 HOURS
Qty: 0 | Refills: 0 | Status: DISCONTINUED | OUTPATIENT
Start: 2018-02-27 | End: 2018-02-28

## 2018-02-27 RX ORDER — MONTELUKAST 4 MG/1
10 TABLET, CHEWABLE ORAL DAILY
Qty: 0 | Refills: 0 | Status: DISCONTINUED | OUTPATIENT
Start: 2018-02-27 | End: 2018-02-28

## 2018-02-27 RX ADMIN — Medication 1 APPLICATION(S): at 17:47

## 2018-02-27 RX ADMIN — AMPICILLIN SODIUM AND SULBACTAM SODIUM 200 GRAM(S): 250; 125 INJECTION, POWDER, FOR SUSPENSION INTRAMUSCULAR; INTRAVENOUS at 12:14

## 2018-02-27 RX ADMIN — Medication 250 MILLIGRAM(S): at 13:30

## 2018-02-27 RX ADMIN — Medication 500 MILLIGRAM(S): at 17:47

## 2018-02-27 RX ADMIN — SODIUM CHLORIDE 2000 MILLILITER(S): 9 INJECTION INTRAMUSCULAR; INTRAVENOUS; SUBCUTANEOUS at 12:14

## 2018-02-27 RX ADMIN — SODIUM CHLORIDE 100 MILLILITER(S): 9 INJECTION, SOLUTION INTRAVENOUS at 20:46

## 2018-02-27 RX ADMIN — AMLODIPINE BESYLATE 10 MILLIGRAM(S): 2.5 TABLET ORAL at 17:47

## 2018-02-27 RX ADMIN — TACROLIMUS 0.5 MILLIGRAM(S): 5 CAPSULE ORAL at 23:57

## 2018-02-27 RX ADMIN — HEPARIN SODIUM 5000 UNIT(S): 5000 INJECTION INTRAVENOUS; SUBCUTANEOUS at 21:38

## 2018-02-27 RX ADMIN — AMPICILLIN SODIUM AND SULBACTAM SODIUM 200 GRAM(S): 250; 125 INJECTION, POWDER, FOR SUSPENSION INTRAMUSCULAR; INTRAVENOUS at 17:46

## 2018-02-27 RX ADMIN — AMPICILLIN SODIUM AND SULBACTAM SODIUM 200 GRAM(S): 250; 125 INJECTION, POWDER, FOR SUSPENSION INTRAMUSCULAR; INTRAVENOUS at 23:29

## 2018-02-27 RX ADMIN — Medication 10 MILLIGRAM(S): at 17:47

## 2018-02-27 NOTE — H&P ADULT - ASSESSMENT
Admit to Burn service on floor.  Possible OR later this week.   Silvadene and kerlex on wound once a day  Continue Home meds.

## 2018-02-27 NOTE — H&P ADULT - NSHPPHYSICALEXAM_GEN_ALL_CORE
PHYSICAL EXAM: I have reviewed current vital signs.  GENERAL: NAD, Well-nourished; Well-developed  HEAD:  Atraumatic, Normocephalic  EYES: EOMI, PERRLA, conjunctiva and sclera clear  NECK: Supple, No JVD  CHEST/LUNG: Clear to auscultation bilaterally; No wheeze, rales, rhonchi.  HEART: Regular rate and rhythm; No murmurs, rubs, or gallops.  ABDOMEN: Soft, Nontender, Nondistended; Bowel sounds present.  EXTREMITIES:  2+ Peripheral Pulses, (+) swelling, erythema of right LE. Two lacerations with sutures on lateral tibia. (+) infection with pus drainage and necrotic tissue in wound. Tenderness to palpation around wound non tender within wound.  NEUROLOGY: AAO x 3. Motor 5/5. Sensory intact.

## 2018-02-27 NOTE — H&P ADULT - NSHPREVIEWOFSYSTEMS_GEN_ALL_CORE
REVIEW OF SYSTEMS:    CONSTITUTIONAL: No weakness, fevers or chills  EYES/ENT: No visual changes;  No vertigo or throat pain   NECK: No pain or stiffness. No cervical midline tenderness.  RESPIRATORY: No difficulty breathing, cough, wheezing.  CARDIOVASCULAR: No chest pain, SOB, or palpitations  GASTROINTESTINAL: No abdominal or epigastric pain. No nausea, vomiting, or hematemesis.   GENITOURINARY: No dysuria, frequency or hematuria.  NEUROLOGICAL: No headache. No numbness or weakness.  SKIN: No itching, rashes.

## 2018-02-27 NOTE — ED PROVIDER NOTE - PHYSICAL EXAMINATION
CONSTITUTIONAL: Well-developed; well-nourished; in no acute distress.   HEAD: Normocephalic; atraumatic.  ENT: No nasal discharge; airway clear.  NECK: Supple; non tender.  CARD: S1, S2 normal; no murmurs, gallops, or rubs. Regular rate and rhythm.   RESP: No wheezes, rales or rhonchi.  ABD: soft ntnd  EXT: Normal ROM.  2+ distal pulses 5/5 motor, large ulcer approx 5x6cm on anterior R shin that is purulent w/ surrounding erythema. No foreign body. Sensation intact to LEs  NEURO: Alert, oriented, grossly unremarkable  PSYCH: Cooperative, appropriate.

## 2018-02-27 NOTE — ED ADULT NURSE NOTE - OBJECTIVE STATEMENT
A&Ox3, respirations even and unlabored, ambulatory, lower right extremity redness noted with 2 wounds with pus drainage and malodor, pitting edema, tenderness on palpation, + pedal pulse present. Patient reports slip and fall Monday, denies head injury or LOC, was seen in [facility] ED, was evaluated and discharged. Patient states wound drainage and pain increased since yesterday. Denies chest pain, SOB, LOC, calf tenderness. fevers or chills. Hx HTN, DVT, on coumadin.

## 2018-02-27 NOTE — ED PROVIDER NOTE - OBJECTIVE STATEMENT
72 yo F w/ ho auto immune hepatitis on 10mg prednisone daily presents to ED for eval of wound. PT had mechanical fall. [facility] [place] [facility], but today noticed it was more red and painful. Denies any fever, chills, cp, sob, nausea, vomiting, diarrhea.

## 2018-02-27 NOTE — H&P ADULT - HISTORY OF PRESENT ILLNESS
Pt states she fell Monday and went for evaluation to [facility] and laceration was sutured and pt was discharged. Pt came back today for increasing pain in right LE. Patient denies calf pain, fevers, chest pain, SOB, abdominal pain, nausea, vomiting, diarrhea, dizziness, weakness.

## 2018-02-27 NOTE — H&P ADULT - PMH
Asthma    Autoimmune hepatitis treated with steroids    DVT (deep venous thrombosis)    Essential hypertension

## 2018-02-27 NOTE — ED PROVIDER NOTE - CARE PLAN
Principal Discharge DX:	Leg wound, right  Secondary Diagnosis:	Essential hypertension  Secondary Diagnosis:	DVT (deep venous thrombosis)

## 2018-02-27 NOTE — ED PROVIDER NOTE - ATTENDING CONTRIBUTION TO CARE
I personally evaluated the patient. I reviewed the Resident’s or Physician Assistant’s note (as assigned above), and agree with the findings and plan except as documented in my note.  Patient sent in for admission to Dr. Kang

## 2018-02-28 ENCOUNTER — RESULT REVIEW (OUTPATIENT)
Age: 73
End: 2018-02-28

## 2018-02-28 LAB
ANION GAP SERPL CALC-SCNC: 11 MMOL/L — SIGNIFICANT CHANGE UP (ref 7–14)
BUN SERPL-MCNC: 13 MG/DL — SIGNIFICANT CHANGE UP (ref 10–20)
CALCIUM SERPL-MCNC: 8.3 MG/DL — LOW (ref 8.5–10.1)
CHLORIDE SERPL-SCNC: 104 MMOL/L — SIGNIFICANT CHANGE UP (ref 98–110)
CO2 SERPL-SCNC: 27 MMOL/L — SIGNIFICANT CHANGE UP (ref 17–32)
CREAT SERPL-MCNC: 1.1 MG/DL — SIGNIFICANT CHANGE UP (ref 0.7–1.5)
GLUCOSE SERPL-MCNC: 112 MG/DL — HIGH (ref 70–110)
HCT VFR BLD CALC: 28.7 % — LOW (ref 37–47)
HGB BLD-MCNC: 8.6 G/DL — LOW (ref 14–18)
INR BLD: 1.52 RATIO — HIGH (ref 0.65–1.3)
MAGNESIUM SERPL-MCNC: 1.7 MG/DL — LOW (ref 1.8–2.4)
MCHC RBC-ENTMCNC: 22.3 PG — LOW (ref 27–31)
MCHC RBC-ENTMCNC: 30 G/DL — LOW (ref 32–37)
MCV RBC AUTO: 74.4 FL — LOW (ref 81–91)
PLATELET # BLD AUTO: 327 K/UL — SIGNIFICANT CHANGE UP (ref 130–400)
POTASSIUM SERPL-MCNC: 2.8 MMOL/L — LOW (ref 3.5–5)
POTASSIUM SERPL-SCNC: 2.8 MMOL/L — LOW (ref 3.5–5)
PROTHROM AB SERPL-ACNC: 16.6 SEC — SIGNIFICANT CHANGE UP (ref 9.95–12.87)
RBC # BLD: 3.86 M/UL — LOW (ref 4.2–5.4)
RBC # FLD: 17.6 % — HIGH (ref 11.5–14.5)
SODIUM SERPL-SCNC: 142 MMOL/L — SIGNIFICANT CHANGE UP (ref 135–146)
WBC # BLD: 7.97 K/UL — SIGNIFICANT CHANGE UP (ref 4.8–10.8)
WBC # FLD AUTO: 7.97 K/UL — SIGNIFICANT CHANGE UP (ref 4.8–10.8)

## 2018-02-28 RX ORDER — CIPROFLOXACIN LACTATE 400MG/40ML
500 VIAL (ML) INTRAVENOUS EVERY 12 HOURS
Qty: 0 | Refills: 0 | Status: DISCONTINUED | OUTPATIENT
Start: 2018-02-28 | End: 2018-03-06

## 2018-02-28 RX ORDER — MORPHINE SULFATE 50 MG/1
2 CAPSULE, EXTENDED RELEASE ORAL
Qty: 0 | Refills: 0 | Status: DISCONTINUED | OUTPATIENT
Start: 2018-02-28 | End: 2018-02-28

## 2018-02-28 RX ORDER — PANTOPRAZOLE SODIUM 20 MG/1
40 TABLET, DELAYED RELEASE ORAL DAILY
Qty: 0 | Refills: 0 | Status: DISCONTINUED | OUTPATIENT
Start: 2018-02-28 | End: 2018-03-09

## 2018-02-28 RX ORDER — SODIUM CHLORIDE 9 MG/ML
1000 INJECTION, SOLUTION INTRAVENOUS
Qty: 0 | Refills: 0 | Status: DISCONTINUED | OUTPATIENT
Start: 2018-02-28 | End: 2018-02-28

## 2018-02-28 RX ORDER — TACROLIMUS 5 MG/1
0.5 CAPSULE ORAL AT BEDTIME
Qty: 0 | Refills: 0 | Status: DISCONTINUED | OUTPATIENT
Start: 2018-02-28 | End: 2018-03-07

## 2018-02-28 RX ORDER — ACETAMINOPHEN 500 MG
650 TABLET ORAL EVERY 6 HOURS
Qty: 0 | Refills: 0 | Status: DISCONTINUED | OUTPATIENT
Start: 2018-02-28 | End: 2018-03-23

## 2018-02-28 RX ORDER — AMLODIPINE BESYLATE 2.5 MG/1
10 TABLET ORAL EVERY 24 HOURS
Qty: 0 | Refills: 0 | Status: DISCONTINUED | OUTPATIENT
Start: 2018-02-28 | End: 2018-03-10

## 2018-02-28 RX ORDER — TACROLIMUS 5 MG/1
1 CAPSULE ORAL DAILY
Qty: 0 | Refills: 0 | Status: DISCONTINUED | OUTPATIENT
Start: 2018-02-28 | End: 2018-03-07

## 2018-02-28 RX ORDER — HEPARIN SODIUM 5000 [USP'U]/ML
5000 INJECTION INTRAVENOUS; SUBCUTANEOUS EVERY 8 HOURS
Qty: 0 | Refills: 0 | Status: DISCONTINUED | OUTPATIENT
Start: 2018-02-28 | End: 2018-03-01

## 2018-02-28 RX ORDER — MAGNESIUM SULFATE 500 MG/ML
2 VIAL (ML) INJECTION ONCE
Qty: 0 | Refills: 0 | Status: COMPLETED | OUTPATIENT
Start: 2018-02-28 | End: 2018-02-28

## 2018-02-28 RX ORDER — MONTELUKAST 4 MG/1
10 TABLET, CHEWABLE ORAL DAILY
Qty: 0 | Refills: 0 | Status: DISCONTINUED | OUTPATIENT
Start: 2018-02-28 | End: 2018-03-14

## 2018-02-28 RX ORDER — AMPICILLIN SODIUM AND SULBACTAM SODIUM 250; 125 MG/ML; MG/ML
3 INJECTION, POWDER, FOR SUSPENSION INTRAMUSCULAR; INTRAVENOUS EVERY 6 HOURS
Qty: 0 | Refills: 0 | Status: DISCONTINUED | OUTPATIENT
Start: 2018-02-28 | End: 2018-03-06

## 2018-02-28 RX ORDER — POLYETHYLENE GLYCOL 3350 17 G/17G
17 POWDER, FOR SOLUTION ORAL DAILY
Qty: 0 | Refills: 0 | Status: DISCONTINUED | OUTPATIENT
Start: 2018-02-28 | End: 2018-03-23

## 2018-02-28 RX ORDER — POTASSIUM CHLORIDE 20 MEQ
20 PACKET (EA) ORAL
Qty: 0 | Refills: 0 | Status: COMPLETED | OUTPATIENT
Start: 2018-02-28 | End: 2018-03-01

## 2018-02-28 RX ORDER — DOCUSATE SODIUM 100 MG
100 CAPSULE ORAL THREE TIMES A DAY
Qty: 0 | Refills: 0 | Status: DISCONTINUED | OUTPATIENT
Start: 2018-02-28 | End: 2018-03-23

## 2018-02-28 RX ORDER — WARFARIN SODIUM 2.5 MG/1
2 TABLET ORAL ONCE
Qty: 0 | Refills: 0 | Status: COMPLETED | OUTPATIENT
Start: 2018-02-28 | End: 2018-02-28

## 2018-02-28 RX ORDER — METOPROLOL TARTRATE 50 MG
50 TABLET ORAL DAILY
Qty: 0 | Refills: 0 | Status: DISCONTINUED | OUTPATIENT
Start: 2018-02-28 | End: 2018-03-07

## 2018-02-28 RX ORDER — SENNA PLUS 8.6 MG/1
2 TABLET ORAL AT BEDTIME
Qty: 0 | Refills: 0 | Status: DISCONTINUED | OUTPATIENT
Start: 2018-02-28 | End: 2018-03-23

## 2018-02-28 RX ORDER — POTASSIUM CHLORIDE 20 MEQ
40 PACKET (EA) ORAL ONCE
Qty: 0 | Refills: 0 | Status: COMPLETED | OUTPATIENT
Start: 2018-02-28 | End: 2018-02-28

## 2018-02-28 RX ORDER — PHYTONADIONE (VIT K1) 5 MG
5 TABLET ORAL ONCE
Qty: 0 | Refills: 0 | Status: COMPLETED | OUTPATIENT
Start: 2018-02-28 | End: 2018-02-28

## 2018-02-28 RX ADMIN — Medication 40 MILLIEQUIVALENT(S): at 21:48

## 2018-02-28 RX ADMIN — AMPICILLIN SODIUM AND SULBACTAM SODIUM 200 GRAM(S): 250; 125 INJECTION, POWDER, FOR SUSPENSION INTRAMUSCULAR; INTRAVENOUS at 17:30

## 2018-02-28 RX ADMIN — Medication 650 MILLIGRAM(S): at 11:01

## 2018-02-28 RX ADMIN — Medication 50 MILLIGRAM(S): at 05:51

## 2018-02-28 RX ADMIN — AMPICILLIN SODIUM AND SULBACTAM SODIUM 200 GRAM(S): 250; 125 INJECTION, POWDER, FOR SUSPENSION INTRAMUSCULAR; INTRAVENOUS at 06:28

## 2018-02-28 RX ADMIN — Medication 50 GRAM(S): at 22:49

## 2018-02-28 RX ADMIN — Medication 101 MILLIGRAM(S): at 06:28

## 2018-02-28 RX ADMIN — AMPICILLIN SODIUM AND SULBACTAM SODIUM 200 GRAM(S): 250; 125 INJECTION, POWDER, FOR SUSPENSION INTRAMUSCULAR; INTRAVENOUS at 23:59

## 2018-02-28 RX ADMIN — Medication 500 MILLIGRAM(S): at 17:38

## 2018-02-28 RX ADMIN — Medication 650 MILLIGRAM(S): at 17:34

## 2018-02-28 RX ADMIN — TACROLIMUS 1 MILLIGRAM(S): 5 CAPSULE ORAL at 15:12

## 2018-02-28 RX ADMIN — TACROLIMUS 0.5 MILLIGRAM(S): 5 CAPSULE ORAL at 21:51

## 2018-02-28 RX ADMIN — PANTOPRAZOLE SODIUM 40 MILLIGRAM(S): 20 TABLET, DELAYED RELEASE ORAL at 15:13

## 2018-02-28 RX ADMIN — TACROLIMUS 0.5 MILLIGRAM(S): 5 CAPSULE ORAL at 00:02

## 2018-02-28 RX ADMIN — WARFARIN SODIUM 2 MILLIGRAM(S): 2.5 TABLET ORAL at 21:48

## 2018-02-28 RX ADMIN — SODIUM CHLORIDE 100 MILLILITER(S): 9 INJECTION, SOLUTION INTRAVENOUS at 13:24

## 2018-02-28 RX ADMIN — MONTELUKAST 10 MILLIGRAM(S): 4 TABLET, CHEWABLE ORAL at 15:12

## 2018-02-28 RX ADMIN — SODIUM CHLORIDE 100 MILLILITER(S): 9 INJECTION, SOLUTION INTRAVENOUS at 05:47

## 2018-02-28 RX ADMIN — AMLODIPINE BESYLATE 10 MILLIGRAM(S): 2.5 TABLET ORAL at 17:40

## 2018-02-28 RX ADMIN — HEPARIN SODIUM 5000 UNIT(S): 5000 INJECTION INTRAVENOUS; SUBCUTANEOUS at 21:49

## 2018-02-28 RX ADMIN — Medication 650 MILLIGRAM(S): at 06:49

## 2018-02-28 RX ADMIN — Medication 10 MILLIGRAM(S): at 17:39

## 2018-02-28 RX ADMIN — Medication 50 MILLIGRAM(S): at 17:40

## 2018-02-28 RX ADMIN — Medication 500 MILLIGRAM(S): at 05:50

## 2018-02-28 NOTE — BRIEF OPERATIVE NOTE - PROCEDURE
<<-----Click on this checkbox to enter Procedure Debridement  02/28/2018  sharp excisional debridement right lower leg 20x10 cm, wound vac placement  Active  [email or web address]

## 2018-02-28 NOTE — BRIEF OPERATIVE NOTE - POST-OP DX
Wound  02/28/2018  traumatic wound right lower leg  Active  Enrike Kang  Wound  02/28/2018    Active  Enrike Kang

## 2018-02-28 NOTE — PRE-ANESTHESIA EVALUATION ADULT - NSANTHOSAYNRD_GEN_A_CORE
No. MAY screening performed.  STOP BANG Legend: 0-2 = LOW Risk; 3-4 = INTERMEDIATE Risk; 5-8 = HIGH Risk

## 2018-03-01 LAB
-  AMPICILLIN/SULBACTAM: SIGNIFICANT CHANGE UP
-  CEFAZOLIN: SIGNIFICANT CHANGE UP
-  CIPROFLOXACIN: SIGNIFICANT CHANGE UP
-  CLINDAMYCIN: SIGNIFICANT CHANGE UP
-  DAPTOMYCIN: SIGNIFICANT CHANGE UP
-  ERYTHROMYCIN: SIGNIFICANT CHANGE UP
-  GENTAMICIN: SIGNIFICANT CHANGE UP
-  LEVOFLOXACIN: SIGNIFICANT CHANGE UP
-  LINEZOLID: SIGNIFICANT CHANGE UP
-  MOXIFLOXACIN(AEROBIC): SIGNIFICANT CHANGE UP
-  OXACILLIN: SIGNIFICANT CHANGE UP
-  PENICILLIN: SIGNIFICANT CHANGE UP
-  RIFAMPIN: SIGNIFICANT CHANGE UP
-  TETRACYCLINE: SIGNIFICANT CHANGE UP
-  TRIMETHOPRIM/SULFAMETHOXAZOLE: SIGNIFICANT CHANGE UP
-  VANCOMYCIN: SIGNIFICANT CHANGE UP
ALBUMIN SERPL ELPH-MCNC: 2.6 G/DL — LOW (ref 3–5.5)
ALP SERPL-CCNC: 75 U/L — SIGNIFICANT CHANGE UP (ref 30–115)
ALT FLD-CCNC: 23 U/L — SIGNIFICANT CHANGE UP (ref 0–41)
ANION GAP SERPL CALC-SCNC: 10 MMOL/L — SIGNIFICANT CHANGE UP (ref 7–14)
AST SERPL-CCNC: 23 U/L — SIGNIFICANT CHANGE UP (ref 0–41)
BILIRUB SERPL-MCNC: 1 MG/DL — SIGNIFICANT CHANGE UP (ref 0.2–1.2)
BUN SERPL-MCNC: 11 MG/DL — SIGNIFICANT CHANGE UP (ref 10–20)
CALCIUM SERPL-MCNC: 8.5 MG/DL — SIGNIFICANT CHANGE UP (ref 8.5–10.1)
CHLORIDE SERPL-SCNC: 107 MMOL/L — SIGNIFICANT CHANGE UP (ref 98–110)
CO2 SERPL-SCNC: 26 MMOL/L — SIGNIFICANT CHANGE UP (ref 17–32)
CREAT SERPL-MCNC: 1.5 MG/DL — SIGNIFICANT CHANGE UP (ref 0.7–1.5)
CULTURE RESULTS: SIGNIFICANT CHANGE UP
GLUCOSE SERPL-MCNC: 112 MG/DL — HIGH (ref 70–110)
HCT VFR BLD CALC: 29.3 % — LOW (ref 37–47)
HGB BLD-MCNC: 8.6 G/DL — LOW (ref 12–16)
MAGNESIUM SERPL-MCNC: 2.1 MG/DL — SIGNIFICANT CHANGE UP (ref 1.8–2.4)
MCHC RBC-ENTMCNC: 21.8 PG — LOW (ref 27–31)
MCHC RBC-ENTMCNC: 29.4 G/DL — LOW (ref 32–37)
MCV RBC AUTO: 74.2 FL — LOW (ref 81–99)
METHOD TYPE: SIGNIFICANT CHANGE UP
NRBC # BLD: 0 /100 WBCS — SIGNIFICANT CHANGE UP (ref 0–0)
ORGANISM # SPEC MICROSCOPIC CNT: SIGNIFICANT CHANGE UP
ORGANISM # SPEC MICROSCOPIC CNT: SIGNIFICANT CHANGE UP
PLATELET # BLD AUTO: 357 K/UL — SIGNIFICANT CHANGE UP (ref 130–400)
POTASSIUM SERPL-MCNC: 4.2 MMOL/L — SIGNIFICANT CHANGE UP (ref 3.5–5)
POTASSIUM SERPL-SCNC: 4.2 MMOL/L — SIGNIFICANT CHANGE UP (ref 3.5–5)
PROT SERPL-MCNC: 5.5 G/DL — LOW (ref 6–8)
RBC # BLD: 3.95 M/UL — LOW (ref 4.2–5.4)
RBC # FLD: 18 % — HIGH (ref 11.5–14.5)
SODIUM SERPL-SCNC: 143 MMOL/L — SIGNIFICANT CHANGE UP (ref 135–146)
SPECIMEN SOURCE: SIGNIFICANT CHANGE UP
WBC # BLD: 9.97 K/UL — SIGNIFICANT CHANGE UP (ref 4.8–10.8)
WBC # FLD AUTO: 9.97 K/UL — SIGNIFICANT CHANGE UP (ref 4.8–10.8)

## 2018-03-01 RX ORDER — ENOXAPARIN SODIUM 100 MG/ML
80 INJECTION SUBCUTANEOUS EVERY 12 HOURS
Qty: 0 | Refills: 0 | Status: DISCONTINUED | OUTPATIENT
Start: 2018-03-01 | End: 2018-03-05

## 2018-03-01 RX ORDER — WARFARIN SODIUM 2.5 MG/1
4 TABLET ORAL ONCE
Qty: 0 | Refills: 0 | Status: COMPLETED | OUTPATIENT
Start: 2018-03-01 | End: 2018-03-01

## 2018-03-01 RX ORDER — BUDESONIDE, MICRONIZED 100 %
3 POWDER (GRAM) MISCELLANEOUS
Qty: 0 | Refills: 0 | Status: DISCONTINUED | OUTPATIENT
Start: 2018-03-01 | End: 2018-03-07

## 2018-03-01 RX ADMIN — MONTELUKAST 10 MILLIGRAM(S): 4 TABLET, CHEWABLE ORAL at 11:28

## 2018-03-01 RX ADMIN — Medication 650 MILLIGRAM(S): at 14:01

## 2018-03-01 RX ADMIN — Medication 10 MILLIGRAM(S): at 18:43

## 2018-03-01 RX ADMIN — AMPICILLIN SODIUM AND SULBACTAM SODIUM 200 GRAM(S): 250; 125 INJECTION, POWDER, FOR SUSPENSION INTRAMUSCULAR; INTRAVENOUS at 11:41

## 2018-03-01 RX ADMIN — PANTOPRAZOLE SODIUM 40 MILLIGRAM(S): 20 TABLET, DELAYED RELEASE ORAL at 11:28

## 2018-03-01 RX ADMIN — TACROLIMUS 1 MILLIGRAM(S): 5 CAPSULE ORAL at 11:28

## 2018-03-01 RX ADMIN — Medication 3 MILLIGRAM(S): at 18:44

## 2018-03-01 RX ADMIN — Medication 50 MILLIGRAM(S): at 06:22

## 2018-03-01 RX ADMIN — WARFARIN SODIUM 4 MILLIGRAM(S): 2.5 TABLET ORAL at 21:11

## 2018-03-01 RX ADMIN — TACROLIMUS 0.5 MILLIGRAM(S): 5 CAPSULE ORAL at 21:11

## 2018-03-01 RX ADMIN — HEPARIN SODIUM 5000 UNIT(S): 5000 INJECTION INTRAVENOUS; SUBCUTANEOUS at 05:25

## 2018-03-01 RX ADMIN — Medication 500 MILLIGRAM(S): at 18:43

## 2018-03-01 RX ADMIN — ENOXAPARIN SODIUM 80 MILLIGRAM(S): 100 INJECTION SUBCUTANEOUS at 18:44

## 2018-03-01 RX ADMIN — Medication 650 MILLIGRAM(S): at 04:04

## 2018-03-01 RX ADMIN — Medication 50 MILLIEQUIVALENT(S): at 02:23

## 2018-03-01 RX ADMIN — AMLODIPINE BESYLATE 10 MILLIGRAM(S): 2.5 TABLET ORAL at 18:43

## 2018-03-01 RX ADMIN — Medication 50 MILLIEQUIVALENT(S): at 06:55

## 2018-03-01 RX ADMIN — AMPICILLIN SODIUM AND SULBACTAM SODIUM 200 GRAM(S): 250; 125 INJECTION, POWDER, FOR SUSPENSION INTRAMUSCULAR; INTRAVENOUS at 23:41

## 2018-03-01 RX ADMIN — AMPICILLIN SODIUM AND SULBACTAM SODIUM 200 GRAM(S): 250; 125 INJECTION, POWDER, FOR SUSPENSION INTRAMUSCULAR; INTRAVENOUS at 18:43

## 2018-03-01 RX ADMIN — Medication 500 MILLIGRAM(S): at 05:25

## 2018-03-01 RX ADMIN — AMPICILLIN SODIUM AND SULBACTAM SODIUM 200 GRAM(S): 250; 125 INJECTION, POWDER, FOR SUSPENSION INTRAMUSCULAR; INTRAVENOUS at 05:25

## 2018-03-01 RX ADMIN — Medication 650 MILLIGRAM(S): at 13:31

## 2018-03-01 RX ADMIN — Medication 50 MILLIEQUIVALENT(S): at 00:47

## 2018-03-01 NOTE — PROGRESS NOTE ADULT - ASSESSMENT
A/P :    Stable  FT wound left leg - Continue NPWT - dressing change in am  Continue pain mgmt, VTE prophylaxis,  ID - nml wbc - continue IV abx,  F/u wound cxs  Discharge planning with NPWT

## 2018-03-01 NOTE — PROGRESS NOTE ADULT - SUBJECTIVE AND OBJECTIVE BOX
Pt stable. no complaints   Lei diet; ambulating     EXAM:   Right leg wound site- NPWT in progress.  no bleeding, minimal drainage

## 2018-03-02 LAB
-  AMPICILLIN/SULBACTAM: SIGNIFICANT CHANGE UP
-  CEFAZOLIN: SIGNIFICANT CHANGE UP
-  CIPROFLOXACIN: SIGNIFICANT CHANGE UP
-  CLINDAMYCIN: SIGNIFICANT CHANGE UP
-  DAPTOMYCIN: SIGNIFICANT CHANGE UP
-  ERYTHROMYCIN: SIGNIFICANT CHANGE UP
-  GENTAMICIN: SIGNIFICANT CHANGE UP
-  LEVOFLOXACIN: SIGNIFICANT CHANGE UP
-  LINEZOLID: SIGNIFICANT CHANGE UP
-  MOXIFLOXACIN(AEROBIC): SIGNIFICANT CHANGE UP
-  OXACILLIN: SIGNIFICANT CHANGE UP
-  PENICILLIN: SIGNIFICANT CHANGE UP
-  RIFAMPIN: SIGNIFICANT CHANGE UP
-  TETRACYCLINE: SIGNIFICANT CHANGE UP
-  TRIMETHOPRIM/SULFAMETHOXAZOLE: SIGNIFICANT CHANGE UP
-  VANCOMYCIN: SIGNIFICANT CHANGE UP
INR BLD: 1.33 RATIO — HIGH (ref 0.65–1.3)
INR BLD: 1.48 RATIO — HIGH (ref 0.65–1.3)
METHOD TYPE: SIGNIFICANT CHANGE UP
PROTHROM AB SERPL-ACNC: 14.4 SEC — HIGH (ref 9.95–12.87)
PROTHROM AB SERPL-ACNC: 16.1 SEC — HIGH (ref 9.95–12.87)
SURGICAL PATHOLOGY STUDY: SIGNIFICANT CHANGE UP

## 2018-03-02 RX ORDER — OXYCODONE AND ACETAMINOPHEN 5; 325 MG/1; MG/1
2 TABLET ORAL EVERY 4 HOURS
Qty: 0 | Refills: 0 | Status: DISCONTINUED | OUTPATIENT
Start: 2018-03-02 | End: 2018-03-02

## 2018-03-02 RX ORDER — WARFARIN SODIUM 2.5 MG/1
5 TABLET ORAL ONCE
Qty: 0 | Refills: 0 | Status: COMPLETED | OUTPATIENT
Start: 2018-03-02 | End: 2018-03-02

## 2018-03-02 RX ORDER — ONDANSETRON 8 MG/1
4 TABLET, FILM COATED ORAL EVERY 8 HOURS
Qty: 0 | Refills: 0 | Status: DISCONTINUED | OUTPATIENT
Start: 2018-03-02 | End: 2018-03-23

## 2018-03-02 RX ORDER — HYDROMORPHONE HYDROCHLORIDE 2 MG/ML
2 INJECTION INTRAMUSCULAR; INTRAVENOUS; SUBCUTANEOUS
Qty: 0 | Refills: 0 | Status: DISCONTINUED | OUTPATIENT
Start: 2018-03-02 | End: 2018-03-08

## 2018-03-02 RX ORDER — HYDROMORPHONE HYDROCHLORIDE 2 MG/ML
2 INJECTION INTRAMUSCULAR; INTRAVENOUS; SUBCUTANEOUS ONCE
Qty: 0 | Refills: 0 | Status: DISCONTINUED | OUTPATIENT
Start: 2018-03-02 | End: 2018-03-03

## 2018-03-02 RX ADMIN — Medication 500 MILLIGRAM(S): at 18:02

## 2018-03-02 RX ADMIN — HYDROMORPHONE HYDROCHLORIDE 2 MILLIGRAM(S): 2 INJECTION INTRAMUSCULAR; INTRAVENOUS; SUBCUTANEOUS at 10:14

## 2018-03-02 RX ADMIN — Medication 10 MILLIGRAM(S): at 18:02

## 2018-03-02 RX ADMIN — Medication 500 MILLIGRAM(S): at 06:11

## 2018-03-02 RX ADMIN — AMPICILLIN SODIUM AND SULBACTAM SODIUM 200 GRAM(S): 250; 125 INJECTION, POWDER, FOR SUSPENSION INTRAMUSCULAR; INTRAVENOUS at 18:03

## 2018-03-02 RX ADMIN — ONDANSETRON 4 MILLIGRAM(S): 8 TABLET, FILM COATED ORAL at 08:15

## 2018-03-02 RX ADMIN — TACROLIMUS 0.5 MILLIGRAM(S): 5 CAPSULE ORAL at 22:12

## 2018-03-02 RX ADMIN — AMLODIPINE BESYLATE 10 MILLIGRAM(S): 2.5 TABLET ORAL at 18:02

## 2018-03-02 RX ADMIN — WARFARIN SODIUM 5 MILLIGRAM(S): 2.5 TABLET ORAL at 22:10

## 2018-03-02 RX ADMIN — ENOXAPARIN SODIUM 80 MILLIGRAM(S): 100 INJECTION SUBCUTANEOUS at 18:02

## 2018-03-02 RX ADMIN — MONTELUKAST 10 MILLIGRAM(S): 4 TABLET, CHEWABLE ORAL at 12:50

## 2018-03-02 RX ADMIN — ENOXAPARIN SODIUM 80 MILLIGRAM(S): 100 INJECTION SUBCUTANEOUS at 06:12

## 2018-03-02 RX ADMIN — TACROLIMUS 1 MILLIGRAM(S): 5 CAPSULE ORAL at 12:50

## 2018-03-02 RX ADMIN — PANTOPRAZOLE SODIUM 40 MILLIGRAM(S): 20 TABLET, DELAYED RELEASE ORAL at 12:52

## 2018-03-02 RX ADMIN — Medication 3 MILLIGRAM(S): at 06:10

## 2018-03-02 RX ADMIN — Medication 50 MILLIGRAM(S): at 06:10

## 2018-03-02 RX ADMIN — Medication 650 MILLIGRAM(S): at 00:45

## 2018-03-02 RX ADMIN — AMPICILLIN SODIUM AND SULBACTAM SODIUM 200 GRAM(S): 250; 125 INJECTION, POWDER, FOR SUSPENSION INTRAMUSCULAR; INTRAVENOUS at 06:08

## 2018-03-02 RX ADMIN — AMPICILLIN SODIUM AND SULBACTAM SODIUM 200 GRAM(S): 250; 125 INJECTION, POWDER, FOR SUSPENSION INTRAMUSCULAR; INTRAVENOUS at 12:52

## 2018-03-02 RX ADMIN — Medication 650 MILLIGRAM(S): at 00:16

## 2018-03-03 LAB
ANION GAP SERPL CALC-SCNC: 12 MMOL/L — SIGNIFICANT CHANGE UP (ref 7–14)
BUN SERPL-MCNC: 15 MG/DL — SIGNIFICANT CHANGE UP (ref 10–20)
CALCIUM SERPL-MCNC: 8.3 MG/DL — LOW (ref 8.5–10.1)
CHLORIDE SERPL-SCNC: 108 MMOL/L — SIGNIFICANT CHANGE UP (ref 98–110)
CO2 SERPL-SCNC: 23 MMOL/L — SIGNIFICANT CHANGE UP (ref 17–32)
CREAT SERPL-MCNC: 1.3 MG/DL — SIGNIFICANT CHANGE UP (ref 0.7–1.5)
GLUCOSE SERPL-MCNC: 131 MG/DL — HIGH (ref 70–110)
HCT VFR BLD CALC: 27.1 % — LOW (ref 37–47)
HGB BLD-MCNC: 8.1 G/DL — LOW (ref 12–16)
INR BLD: 1.74 RATIO — HIGH (ref 0.65–1.3)
INR BLD: 1.76 RATIO — HIGH (ref 0.65–1.3)
MAGNESIUM SERPL-MCNC: 2.1 MG/DL — SIGNIFICANT CHANGE UP (ref 1.8–2.4)
MCHC RBC-ENTMCNC: 22.3 PG — LOW (ref 27–31)
MCHC RBC-ENTMCNC: 29.9 G/DL — LOW (ref 32–37)
MCV RBC AUTO: 74.5 FL — LOW (ref 81–99)
NRBC # BLD: 0 /100 WBCS — SIGNIFICANT CHANGE UP (ref 0–0)
PLATELET # BLD AUTO: 359 K/UL — SIGNIFICANT CHANGE UP (ref 130–400)
POTASSIUM SERPL-MCNC: 4.3 MMOL/L — SIGNIFICANT CHANGE UP (ref 3.5–5)
POTASSIUM SERPL-SCNC: 4.3 MMOL/L — SIGNIFICANT CHANGE UP (ref 3.5–5)
PROTHROM AB SERPL-ACNC: 19 SEC — HIGH (ref 9.95–12.87)
PROTHROM AB SERPL-ACNC: 19.2 SEC — HIGH (ref 9.95–12.87)
RBC # BLD: 3.64 M/UL — LOW (ref 4.2–5.4)
RBC # FLD: 18.2 % — HIGH (ref 11.5–14.5)
SODIUM SERPL-SCNC: 143 MMOL/L — SIGNIFICANT CHANGE UP (ref 135–146)
WBC # BLD: 13.03 K/UL — HIGH (ref 4.8–10.8)
WBC # FLD AUTO: 13.03 K/UL — HIGH (ref 4.8–10.8)

## 2018-03-03 RX ORDER — ENOXAPARIN SODIUM 100 MG/ML
80 INJECTION SUBCUTANEOUS ONCE
Qty: 0 | Refills: 0 | Status: COMPLETED | OUTPATIENT
Start: 2018-03-03 | End: 2018-03-03

## 2018-03-03 RX ORDER — WARFARIN SODIUM 2.5 MG/1
5 TABLET ORAL DAILY
Qty: 0 | Refills: 0 | Status: COMPLETED | OUTPATIENT
Start: 2018-03-03 | End: 2018-03-03

## 2018-03-03 RX ORDER — HYDROMORPHONE HYDROCHLORIDE 2 MG/ML
1 INJECTION INTRAMUSCULAR; INTRAVENOUS; SUBCUTANEOUS ONCE
Qty: 0 | Refills: 0 | Status: DISCONTINUED | OUTPATIENT
Start: 2018-03-03 | End: 2018-03-03

## 2018-03-03 RX ADMIN — Medication 50 MILLIGRAM(S): at 06:42

## 2018-03-03 RX ADMIN — Medication 10 MILLIGRAM(S): at 17:18

## 2018-03-03 RX ADMIN — AMPICILLIN SODIUM AND SULBACTAM SODIUM 200 GRAM(S): 250; 125 INJECTION, POWDER, FOR SUSPENSION INTRAMUSCULAR; INTRAVENOUS at 17:18

## 2018-03-03 RX ADMIN — AMPICILLIN SODIUM AND SULBACTAM SODIUM 200 GRAM(S): 250; 125 INJECTION, POWDER, FOR SUSPENSION INTRAMUSCULAR; INTRAVENOUS at 06:41

## 2018-03-03 RX ADMIN — WARFARIN SODIUM 5 MILLIGRAM(S): 2.5 TABLET ORAL at 21:58

## 2018-03-03 RX ADMIN — ENOXAPARIN SODIUM 80 MILLIGRAM(S): 100 INJECTION SUBCUTANEOUS at 13:05

## 2018-03-03 RX ADMIN — Medication 3 MILLIGRAM(S): at 17:20

## 2018-03-03 RX ADMIN — Medication 3 MILLIGRAM(S): at 06:45

## 2018-03-03 RX ADMIN — AMPICILLIN SODIUM AND SULBACTAM SODIUM 200 GRAM(S): 250; 125 INJECTION, POWDER, FOR SUSPENSION INTRAMUSCULAR; INTRAVENOUS at 11:13

## 2018-03-03 RX ADMIN — AMPICILLIN SODIUM AND SULBACTAM SODIUM 200 GRAM(S): 250; 125 INJECTION, POWDER, FOR SUSPENSION INTRAMUSCULAR; INTRAVENOUS at 00:52

## 2018-03-03 RX ADMIN — Medication 500 MILLIGRAM(S): at 17:18

## 2018-03-03 RX ADMIN — TACROLIMUS 0.5 MILLIGRAM(S): 5 CAPSULE ORAL at 21:58

## 2018-03-03 RX ADMIN — MONTELUKAST 10 MILLIGRAM(S): 4 TABLET, CHEWABLE ORAL at 11:12

## 2018-03-03 RX ADMIN — Medication 500 MILLIGRAM(S): at 06:41

## 2018-03-03 RX ADMIN — TACROLIMUS 1 MILLIGRAM(S): 5 CAPSULE ORAL at 11:12

## 2018-03-03 RX ADMIN — PANTOPRAZOLE SODIUM 40 MILLIGRAM(S): 20 TABLET, DELAYED RELEASE ORAL at 11:14

## 2018-03-03 RX ADMIN — AMLODIPINE BESYLATE 10 MILLIGRAM(S): 2.5 TABLET ORAL at 06:41

## 2018-03-04 ENCOUNTER — TRANSCRIPTION ENCOUNTER (OUTPATIENT)
Age: 73
End: 2018-03-04

## 2018-03-04 LAB
APTT BLD: 29.4 SEC — SIGNIFICANT CHANGE UP (ref 27–39.2)
BASE EXCESS BLDA CALC-SCNC: 1.3 MMOL/L — SIGNIFICANT CHANGE UP (ref -2–2)
GAS PNL BLDA: SIGNIFICANT CHANGE UP
HCO3 BLDA-SCNC: 25 MMOL/L — SIGNIFICANT CHANGE UP (ref 23–27)
HOROWITZ INDEX BLDA+IHG-RTO: 32 — SIGNIFICANT CHANGE UP
INR BLD: 2.66 RATIO — HIGH (ref 0.65–1.3)
PCO2 BLDA: 34 MMHG — LOW (ref 38–42)
PH BLDA: 7.47 — HIGH (ref 7.38–7.42)
PO2 BLDA: 52 MMHG — LOW (ref 78–95)
PROTHROM AB SERPL-ACNC: 29.3 SEC — HIGH (ref 9.95–12.87)
SAO2 % BLDA: 87 % — LOW (ref 94–98)

## 2018-03-04 RX ORDER — ALBUTEROL 90 UG/1
1 AEROSOL, METERED ORAL EVERY 4 HOURS
Qty: 0 | Refills: 0 | Status: DISCONTINUED | OUTPATIENT
Start: 2018-03-04 | End: 2018-03-23

## 2018-03-04 RX ORDER — SENNA PLUS 8.6 MG/1
2 TABLET ORAL
Qty: 20 | Refills: 0 | OUTPATIENT
Start: 2018-03-04 | End: 2018-03-13

## 2018-03-04 RX ORDER — IPRATROPIUM/ALBUTEROL SULFATE 18-103MCG
3 AEROSOL WITH ADAPTER (GRAM) INHALATION EVERY 6 HOURS
Qty: 0 | Refills: 0 | Status: DISCONTINUED | OUTPATIENT
Start: 2018-03-04 | End: 2018-03-23

## 2018-03-04 RX ORDER — WARFARIN SODIUM 2.5 MG/1
1 TABLET ORAL
Qty: 0 | Refills: 0 | COMMUNITY

## 2018-03-04 RX ORDER — DOCUSATE SODIUM 100 MG
1 CAPSULE ORAL
Qty: 30 | Refills: 0 | OUTPATIENT
Start: 2018-03-04 | End: 2018-03-13

## 2018-03-04 RX ORDER — CIPROFLOXACIN LACTATE 400MG/40ML
1 VIAL (ML) INTRAVENOUS
Qty: 20 | Refills: 0 | OUTPATIENT
Start: 2018-03-04 | End: 2018-03-13

## 2018-03-04 RX ORDER — WARFARIN SODIUM 2.5 MG/1
1 TABLET ORAL
Qty: 10 | Refills: 0 | OUTPATIENT
Start: 2018-03-04 | End: 2018-03-13

## 2018-03-04 RX ORDER — TIOTROPIUM BROMIDE 18 UG/1
1 CAPSULE ORAL; RESPIRATORY (INHALATION) DAILY
Qty: 0 | Refills: 0 | Status: DISCONTINUED | OUTPATIENT
Start: 2018-03-04 | End: 2018-03-23

## 2018-03-04 RX ORDER — ACETAMINOPHEN 500 MG
1 TABLET ORAL
Qty: 40 | Refills: 0 | OUTPATIENT
Start: 2018-03-04 | End: 2018-03-13

## 2018-03-04 RX ADMIN — Medication 3 MILLIGRAM(S): at 06:58

## 2018-03-04 RX ADMIN — Medication 3 MILLILITER(S): at 20:04

## 2018-03-04 RX ADMIN — PANTOPRAZOLE SODIUM 40 MILLIGRAM(S): 20 TABLET, DELAYED RELEASE ORAL at 11:55

## 2018-03-04 RX ADMIN — MONTELUKAST 10 MILLIGRAM(S): 4 TABLET, CHEWABLE ORAL at 11:54

## 2018-03-04 RX ADMIN — AMPICILLIN SODIUM AND SULBACTAM SODIUM 200 GRAM(S): 250; 125 INJECTION, POWDER, FOR SUSPENSION INTRAMUSCULAR; INTRAVENOUS at 00:35

## 2018-03-04 RX ADMIN — AMPICILLIN SODIUM AND SULBACTAM SODIUM 200 GRAM(S): 250; 125 INJECTION, POWDER, FOR SUSPENSION INTRAMUSCULAR; INTRAVENOUS at 17:14

## 2018-03-04 RX ADMIN — Medication 10 MILLIGRAM(S): at 17:22

## 2018-03-04 RX ADMIN — AMLODIPINE BESYLATE 10 MILLIGRAM(S): 2.5 TABLET ORAL at 06:58

## 2018-03-04 RX ADMIN — Medication 3 MILLIGRAM(S): at 17:14

## 2018-03-04 RX ADMIN — TACROLIMUS 0.5 MILLIGRAM(S): 5 CAPSULE ORAL at 23:03

## 2018-03-04 RX ADMIN — Medication 500 MILLIGRAM(S): at 06:57

## 2018-03-04 RX ADMIN — TACROLIMUS 1 MILLIGRAM(S): 5 CAPSULE ORAL at 11:54

## 2018-03-04 RX ADMIN — ENOXAPARIN SODIUM 80 MILLIGRAM(S): 100 INJECTION SUBCUTANEOUS at 12:28

## 2018-03-04 RX ADMIN — AMPICILLIN SODIUM AND SULBACTAM SODIUM 200 GRAM(S): 250; 125 INJECTION, POWDER, FOR SUSPENSION INTRAMUSCULAR; INTRAVENOUS at 06:57

## 2018-03-04 RX ADMIN — AMPICILLIN SODIUM AND SULBACTAM SODIUM 200 GRAM(S): 250; 125 INJECTION, POWDER, FOR SUSPENSION INTRAMUSCULAR; INTRAVENOUS at 11:54

## 2018-03-04 RX ADMIN — Medication 500 MILLIGRAM(S): at 17:13

## 2018-03-04 RX ADMIN — ENOXAPARIN SODIUM 80 MILLIGRAM(S): 100 INJECTION SUBCUTANEOUS at 00:35

## 2018-03-04 RX ADMIN — AMPICILLIN SODIUM AND SULBACTAM SODIUM 200 GRAM(S): 250; 125 INJECTION, POWDER, FOR SUSPENSION INTRAMUSCULAR; INTRAVENOUS at 23:02

## 2018-03-04 RX ADMIN — Medication 50 MILLIGRAM(S): at 06:57

## 2018-03-04 NOTE — DISCHARGE NOTE ADULT - HOSPITAL COURSE
72 yo female with hx of HTN, autoimmune hepatitis on tacrolimus, h/o PCP pneumonia, asthma, prothrombin gene mutation/DVT in 3/2017, p/w persistent pain s/p surgical wound closure after sustaining mechanical fall at home at [facility], admitted for worsening wound infection who was initially admitted under medical service s/p debridement. Her hospital course was complicated by acute hypoxic RF requiring ICU admission; s/p BAL thought to be 2/2 ORSA/fungal PNA. She was downgraded to the medical floors 3/13 and her problems are being managed as follows --      1.Acute Respiratory Failure, secondary to  DAH and PNA (BAL on 3/9 revealing ORSA and yeast)   -continue with current management  -remains on O2 therapy  -prednisone 60mg po q12  -continue respiratory treatments, Montelukast  -on Zyvox PO  -CTA on (3/20) negative for PE    2.Alveolar Hemorrhage   -resolved    3.LUE thrombophlebitis  - warm compresses     3.Hemorrhoids w/ mild bleeding  -as per GI outpatient colonoscopy will be planned upon discharge    4.Hypercoagulability 2/2 prothrombin mutation, h/o DVT and PE 3/2017  -Coumadin on hold 2/2 DAH, will consider restarting next week    5..Right leg laceration s/p debridement 2/28  -local wound care per burn    6.HTN, BP elevated today  -continue Norvasc and Metoprolol    7.AIH   -continue Tacrolimus  -on high dose steroid for pulm dx    #Diet: low sodium

## 2018-03-04 NOTE — DISCHARGE NOTE ADULT - CARE PLAN
Principal Discharge DX:	Acute respiratory failure  Goal:	Resolved  Assessment and plan of treatment:	-prednisone 60mg po q12  -continue respiratory treatments, Montelukast  -on Zyvox PO  - F/U with Pulmonary team for duration of therapy  Secondary Diagnosis:	Cellulitis  Goal:	Improved  Assessment and plan of treatment:	-local wound care per burn  Secondary Diagnosis:	Hypercoagulability due to prothrombin II mutation  Assessment and plan of treatment:	-Coumadin on hold 2/2 DAH, Pulmonary will decide when to restart coumadin given her alveolar hemorrhage hx  Secondary Diagnosis:	Hemorrhoids  Goal:	Outpatient fu  Assessment and plan of treatment:	-as per GI outpatient colonoscopy

## 2018-03-04 NOTE — DISCHARGE NOTE ADULT - PLAN OF CARE
Resolved -prednisone 60mg po q12  -continue respiratory treatments, Montelukast  -on Zyvox PO  - F/U with Pulmonary team for duration of therapy Improved -local wound care per burn -Coumadin on hold 2/2 DAH, Pulmonary will decide when to restart coumadin given her alveolar hemorrhage hx Outpatient fu -as per GI outpatient colonoscopy

## 2018-03-04 NOTE — DISCHARGE NOTE ADULT - MEDICATION SUMMARY - MEDICATIONS TO CHANGE
I will SWITCH the dose or number of times a day I take the medications listed below when I get home from the hospital:  None I will SWITCH the dose or number of times a day I take the medications listed below when I get home from the hospital:    predniSONE 10 mg oral tablet  -- 1 tab(s) by mouth once a day    amLODIPine 10 mg oral tablet  -- 1 tab(s) by mouth once a day    metoprolol tartrate 50 mg oral tablet  -- 1 tab(s) by mouth once a day

## 2018-03-04 NOTE — DISCHARGE NOTE ADULT - PATIENT PORTAL LINK FT
You can access the [facility] Patient Portal, offered by [facility], by registering with the following website: [email or web address]

## 2018-03-04 NOTE — DISCHARGE NOTE ADULT - CARE PROVIDER_API CALL
Ren Nichols (DO), Gastroenterology  [address] [place] [id number]  Phone: [phone]  Fax: [phone]    Jt Price), Critical Care Medicine; Pulmonary Disease; Sleep Medicine  [address] [place] [address]  Phone: [phone]  Fax: [phone]    Pamela Schmidt), Internal Medicine  [address] [place] [address]  Phone: [phone]  Fax: [phone]

## 2018-03-04 NOTE — DISCHARGE NOTE ADULT - MEDICATION SUMMARY - MEDICATIONS TO TAKE
I will START or STAY ON the medications listed below when I get home from the hospital:    predniSONE 10 mg oral tablet  -- 1 tab(s) by mouth once a day  -- Indication: For Asthma    budesonide 3 mg oral capsule, extended release  -- 1 cap(s) by mouth 2 times a day  -- Indication: For Asthma    oxyCODONE-acetaminophen 5 mg-325 mg oral tablet  -- 1 tab(s) by mouth every 6 hours, As Needed -Moderate Pain (4 - 6) MDD:4  -- Indication: For Pain    acetaminophen 325 mg oral tablet  -- 1 tab(s) by mouth every 6 hours, As Needed -Moderate Pain (4 - 6)   -- Indication: For pain    Coumadin 2 mg oral tablet  -- 1 tab(s) by mouth once a day  -- Indication: For DVT (deep venous thrombosis)    metoprolol tartrate 50 mg oral tablet  -- 1 tab(s) by mouth once a day  -- Indication: For Essential hypertension    amLODIPine 10 mg oral tablet  -- 1 tab(s) by mouth once a day  -- Indication: For Essential hypertension    tacrolimus 1 mg oral capsule  -- orally once a day in the morning   -- Indication: For Autoimmune hepatitis treated with steroids    tacrolimus 0.5 mg oral capsule  -- 1 cap(s) by mouth once a day (at bedtime)  -- Indication: For Autoimmune hepatitis treated with steroids    docusate sodium 100 mg oral capsule  -- 1 cap(s) by mouth 3 times a day  -- Indication: For Constipation    senna oral tablet  -- 2 tab(s) by mouth once a day (at bedtime)  -- Indication: For Constipation    Singulair 10 mg oral tablet  -- 1 tab(s) by mouth once a day  -- Indication: For Asthma    ciprofloxacin 500 mg oral tablet  -- 1 tab(s) by mouth every 12 hours  -- Indication: For Cellulitis I will START or STAY ON the medications listed below when I get home from the hospital:    predniSONE 20 mg oral tablet  -- 3 tab(s) by mouth once a day for 1 week then 2 tab daily  -- Indication: For Respiratory distress    budesonide 3 mg oral capsule, extended release  -- 1 cap(s) by mouth 2 times a day  -- Indication: For Asthma    oxyCODONE-acetaminophen 5 mg-325 mg oral tablet  -- 1 tab(s) by mouth every 6 hours, As Needed -Moderate Pain (4 - 6) MDD:4  -- Indication: For Pain    acetaminophen 325 mg oral tablet  -- 1 tab(s) by mouth every 6 hours, As Needed -Moderate Pain (4 - 6)   -- Indication: For pain    heparin  -- 5000 unit(s) subcutaneous 3 times a day  -- Indication: For DVT ppx    insulin glargine  -- 10 unit(s) subcutaneous once (at bedtime)  -- Indication: For Diabetes    insulin lispro  -- 5 unit(s) subcutaneous 3 times a day (before meals)  -- Indication: For Diabetes    metoprolol tartrate 25 mg oral tablet  -- 1 tab(s) by mouth 2 times a day  -- Indication: For HTN    tiotropium 18 mcg inhalation capsule  -- 1 cap(s) inhaled once a day  -- Indication: For Resp Distress    albuterol 90 mcg/inh inhalation aerosol  -- 1 puff(s) inhaled every 4 hours  -- Indication: For RESP dISTRESS    amLODIPine 5 mg oral tablet  -- 1 tab(s) by mouth once a day  -- Indication: For HTN    hydrocortisone 25 mg rectal suppository  -- 1 suppository(ies) rectally once a day  -- Indication: For Rectal suppository    nystatin 100,000 units/g topical powder  -- 1 application on skin every 12 hours  -- Indication: For Rash    bacitracin 500 units/g topical ointment  -- 1 application on skin 2 times a day  -- Indication: For Rash    tacrolimus 0.5 mg oral capsule  -- 1 cap(s) by mouth once a day (at bedtime)  -- Indication: For Immunosuppression    senna oral tablet  -- 2 tab(s) by mouth once a day (at bedtime)  -- Indication: For Constipation    docusate sodium 100 mg oral capsule  -- 1 cap(s) by mouth 3 times a day  -- Indication: For Constipation    montelukast 10 mg oral tablet  -- 1 tab(s) by mouth once a day (at bedtime)  -- Indication: For COPD    linezolid 600 mg oral tablet  -- 1 tab(s) by mouth every 12 hours  -- Indication: For Cellulitis    pantoprazole 40 mg oral delayed release tablet  -- 1 tab(s) by mouth once a day (before a meal)  -- Indication: For GERD    ciprofloxacin 500 mg oral tablet  -- 1 tab(s) by mouth every 12 hours  -- Indication: For Cellulitis

## 2018-03-04 NOTE — DISCHARGE NOTE ADULT - MEDICATION SUMMARY - MEDICATIONS TO STOP TAKING
I will STOP taking the medications listed below when I get home from the hospital:  None I will STOP taking the medications listed below when I get home from the hospital:    tacrolimus 0.5 mg oral capsule  -- 1 cap(s) by mouth every 12 hours    tacrolimus 0.5 mg oral capsule  -- 1 cap(s) by mouth once a day (in the morning)    Coumadin 2 mg oral tablet  -- 1 tab(s) by mouth once a day    tacrolimus 1 mg oral capsule  -- orally once a day in the morning

## 2018-03-04 NOTE — DISCHARGE NOTE ADULT - NSTOBACCOHOTLINE_GEN_A_NCS
Patient Information     Patient Name MRN Sex Malachi Evangelista [id number] Male 1965      Patient Instructions by Katy Woods NP at 5/3/2017  2:00 PM     Author:  Katy Woods NP Service:  (none) Author Type:  PHYS- Nurse Practitioner     Filed:  2017 12:54 PM Encounter Date:  5/3/2017 Status:  Signed     :  Katy Woods NP (PHYS- Nurse Practitioner)            Cool compress to area as needed  Benadryl prn itching  Tylenol/Ibuprofen as needed  Please return if redness worsens, fevers develop or swollen lymph node doesn't resolve        
[facility] Smokers Quitline ([phone])

## 2018-03-05 LAB
ALBUMIN SERPL ELPH-MCNC: 2.4 G/DL — LOW (ref 3–5.5)
ALP SERPL-CCNC: 93 U/L — SIGNIFICANT CHANGE UP (ref 30–115)
ALT FLD-CCNC: 19 U/L — SIGNIFICANT CHANGE UP (ref 0–41)
ANION GAP SERPL CALC-SCNC: 13 MMOL/L — SIGNIFICANT CHANGE UP (ref 7–14)
AST SERPL-CCNC: 25 U/L — SIGNIFICANT CHANGE UP (ref 0–41)
BASE EXCESS BLDA CALC-SCNC: 0.1 MMOL/L — SIGNIFICANT CHANGE UP (ref -2–2)
BILIRUB SERPL-MCNC: 0.7 MG/DL — SIGNIFICANT CHANGE UP (ref 0.2–1.2)
BUN SERPL-MCNC: 18 MG/DL — SIGNIFICANT CHANGE UP (ref 10–20)
CALCIUM SERPL-MCNC: 8.2 MG/DL — LOW (ref 8.5–10.1)
CHLORIDE SERPL-SCNC: 106 MMOL/L — SIGNIFICANT CHANGE UP (ref 98–110)
CO2 SERPL-SCNC: 20 MMOL/L — SIGNIFICANT CHANGE UP (ref 17–32)
CREAT SERPL-MCNC: 1.4 MG/DL — SIGNIFICANT CHANGE UP (ref 0.7–1.5)
CULTURE RESULTS: SIGNIFICANT CHANGE UP
GLUCOSE SERPL-MCNC: 206 MG/DL — HIGH (ref 70–110)
HCO3 BLDA-SCNC: 23 MMOL/L — SIGNIFICANT CHANGE UP (ref 23–27)
HCT VFR BLD CALC: 28.4 % — LOW (ref 37–47)
HGB BLD-MCNC: 8.5 G/DL — LOW (ref 12–16)
INR BLD: 4.38 RATIO — HIGH (ref 0.65–1.3)
MAGNESIUM SERPL-MCNC: 2 MG/DL — SIGNIFICANT CHANGE UP (ref 1.8–2.4)
MCHC RBC-ENTMCNC: 22 PG — LOW (ref 27–31)
MCHC RBC-ENTMCNC: 29.9 G/DL — LOW (ref 32–37)
MCV RBC AUTO: 73.6 FL — LOW (ref 81–99)
NRBC # BLD: 0 /100 WBCS — SIGNIFICANT CHANGE UP (ref 0–0)
ORGANISM # SPEC MICROSCOPIC CNT: SIGNIFICANT CHANGE UP
ORGANISM # SPEC MICROSCOPIC CNT: SIGNIFICANT CHANGE UP
PCO2 BLDA: 29 MMHG — LOW (ref 38–42)
PH BLDA: 7.5 — HIGH (ref 7.38–7.42)
PLATELET # BLD AUTO: 386 K/UL — SIGNIFICANT CHANGE UP (ref 130–400)
PO2 BLDA: 97 MMHG — HIGH (ref 78–95)
POTASSIUM SERPL-MCNC: 3.7 MMOL/L — SIGNIFICANT CHANGE UP (ref 3.5–5)
POTASSIUM SERPL-SCNC: 3.7 MMOL/L — SIGNIFICANT CHANGE UP (ref 3.5–5)
PROT SERPL-MCNC: 5.4 G/DL — LOW (ref 6–8)
PROTHROM AB SERPL-ACNC: >40 SEC — HIGH (ref 9.95–12.87)
RBC # BLD: 3.86 M/UL — LOW (ref 4.2–5.4)
RBC # FLD: 17.9 % — HIGH (ref 11.5–14.5)
SAO2 % BLDA: 98 % — SIGNIFICANT CHANGE UP (ref 94–98)
SODIUM SERPL-SCNC: 139 MMOL/L — SIGNIFICANT CHANGE UP (ref 135–146)
SPECIMEN SOURCE: SIGNIFICANT CHANGE UP
WBC # BLD: 13.83 K/UL — HIGH (ref 4.8–10.8)
WBC # FLD AUTO: 13.83 K/UL — HIGH (ref 4.8–10.8)

## 2018-03-05 RX ORDER — FUROSEMIDE 40 MG
40 TABLET ORAL DAILY
Qty: 0 | Refills: 0 | Status: DISCONTINUED | OUTPATIENT
Start: 2018-03-05 | End: 2018-03-06

## 2018-03-05 RX ORDER — WARFARIN SODIUM 2.5 MG/1
2 TABLET ORAL ONCE
Qty: 0 | Refills: 0 | Status: COMPLETED | OUTPATIENT
Start: 2018-03-05 | End: 2018-03-05

## 2018-03-05 RX ORDER — FUROSEMIDE 40 MG
80 TABLET ORAL ONCE
Qty: 0 | Refills: 0 | Status: COMPLETED | OUTPATIENT
Start: 2018-03-05 | End: 2018-03-05

## 2018-03-05 RX ADMIN — Medication 3 MILLILITER(S): at 19:30

## 2018-03-05 RX ADMIN — AMPICILLIN SODIUM AND SULBACTAM SODIUM 200 GRAM(S): 250; 125 INJECTION, POWDER, FOR SUSPENSION INTRAMUSCULAR; INTRAVENOUS at 23:42

## 2018-03-05 RX ADMIN — Medication 500 MILLIGRAM(S): at 17:32

## 2018-03-05 RX ADMIN — Medication 500 MILLIGRAM(S): at 05:24

## 2018-03-05 RX ADMIN — TACROLIMUS 1 MILLIGRAM(S): 5 CAPSULE ORAL at 11:59

## 2018-03-05 RX ADMIN — AMPICILLIN SODIUM AND SULBACTAM SODIUM 200 GRAM(S): 250; 125 INJECTION, POWDER, FOR SUSPENSION INTRAMUSCULAR; INTRAVENOUS at 13:43

## 2018-03-05 RX ADMIN — TACROLIMUS 0.5 MILLIGRAM(S): 5 CAPSULE ORAL at 22:22

## 2018-03-05 RX ADMIN — Medication 650 MILLIGRAM(S): at 17:34

## 2018-03-05 RX ADMIN — Medication 50 MILLIGRAM(S): at 05:24

## 2018-03-05 RX ADMIN — MONTELUKAST 10 MILLIGRAM(S): 4 TABLET, CHEWABLE ORAL at 11:59

## 2018-03-05 RX ADMIN — AMPICILLIN SODIUM AND SULBACTAM SODIUM 200 GRAM(S): 250; 125 INJECTION, POWDER, FOR SUSPENSION INTRAMUSCULAR; INTRAVENOUS at 17:33

## 2018-03-05 RX ADMIN — Medication 650 MILLIGRAM(S): at 16:30

## 2018-03-05 RX ADMIN — Medication 3 MILLIGRAM(S): at 17:45

## 2018-03-05 RX ADMIN — PANTOPRAZOLE SODIUM 40 MILLIGRAM(S): 20 TABLET, DELAYED RELEASE ORAL at 12:00

## 2018-03-05 RX ADMIN — Medication 10 MILLIGRAM(S): at 17:32

## 2018-03-05 RX ADMIN — Medication 80 MILLIGRAM(S): at 18:48

## 2018-03-05 RX ADMIN — Medication 3 MILLILITER(S): at 14:03

## 2018-03-05 RX ADMIN — WARFARIN SODIUM 2 MILLIGRAM(S): 2.5 TABLET ORAL at 06:00

## 2018-03-05 RX ADMIN — AMLODIPINE BESYLATE 10 MILLIGRAM(S): 2.5 TABLET ORAL at 05:25

## 2018-03-05 RX ADMIN — Medication 3 MILLIGRAM(S): at 05:25

## 2018-03-05 RX ADMIN — Medication 3 MILLILITER(S): at 09:11

## 2018-03-05 NOTE — PROGRESS NOTE ADULT - SUBJECTIVE AND OBJECTIVE BOX
Pt has no complaints. Continue low O2 sat . ON face mask O2  Notes continued sob with ambulation to bathroom    EXAM:  right leg wounds - no bleeding exposed adipose with exudate   dark dessicated patchy areas at wound edges

## 2018-03-05 NOTE — PROGRESS NOTE ADULT - ASSESSMENT
A/Rec     Full thickness wound right leg  Large dressing change done.  Rec: hydrogel and non adherent dressing to site bid.   Duplex Pending   Transfer to medical service for further management   Will follow

## 2018-03-06 LAB
ANION GAP SERPL CALC-SCNC: 9 MMOL/L — SIGNIFICANT CHANGE UP (ref 7–14)
B-TYPE NATRIURETIC PEPTIDE BNP RESULT: 103 PG/ML — HIGH (ref 0–99)
BASE EXCESS BLDA CALC-SCNC: 4.8 MMOL/L — HIGH (ref -2–2)
BUN SERPL-MCNC: 15 MG/DL — SIGNIFICANT CHANGE UP (ref 10–20)
CALCIUM SERPL-MCNC: 8.2 MG/DL — LOW (ref 8.5–10.1)
CHLORIDE SERPL-SCNC: 107 MMOL/L — SIGNIFICANT CHANGE UP (ref 98–110)
CO2 SERPL-SCNC: 25 MMOL/L — SIGNIFICANT CHANGE UP (ref 17–32)
CREAT SERPL-MCNC: 1.2 MG/DL — SIGNIFICANT CHANGE UP (ref 0.7–1.5)
FLU A RESULT: NEGATIVE — SIGNIFICANT CHANGE UP
FLU A RESULT: NEGATIVE — SIGNIFICANT CHANGE UP
FLUAV AG NPH QL: NEGATIVE — SIGNIFICANT CHANGE UP
FLUBV AG NPH QL: NEGATIVE — SIGNIFICANT CHANGE UP
GLUCOSE SERPL-MCNC: 100 MG/DL — SIGNIFICANT CHANGE UP (ref 70–110)
HCO3 BLDA-SCNC: 27 MMOL/L — SIGNIFICANT CHANGE UP (ref 23–27)
HCT VFR BLD CALC: 26.3 % — LOW (ref 37–47)
HGB BLD-MCNC: 8 G/DL — LOW (ref 12–16)
INR BLD: 4.59 RATIO — HIGH (ref 0.65–1.3)
LDH SERPL L TO P-CCNC: 284 U/L — HIGH (ref 60–200)
MCHC RBC-ENTMCNC: 22.1 PG — LOW (ref 27–31)
MCHC RBC-ENTMCNC: 30.4 G/DL — LOW (ref 32–37)
MCV RBC AUTO: 72.7 FL — LOW (ref 81–99)
NRBC # BLD: 0 /100 WBCS — SIGNIFICANT CHANGE UP (ref 0–0)
PCO2 BLDA: 29 MMHG — LOW (ref 38–42)
PH BLDA: 7.58 — HIGH (ref 7.38–7.42)
PLATELET # BLD AUTO: 403 K/UL — HIGH (ref 130–400)
PO2 BLDA: 51 MMHG — LOW (ref 78–95)
POTASSIUM SERPL-MCNC: 3.6 MMOL/L — SIGNIFICANT CHANGE UP (ref 3.5–5)
POTASSIUM SERPL-SCNC: 3.6 MMOL/L — SIGNIFICANT CHANGE UP (ref 3.5–5)
PROTHROM AB SERPL-ACNC: >40 SEC — HIGH (ref 9.95–12.87)
RBC # BLD: 3.62 M/UL — LOW (ref 4.2–5.4)
RBC # FLD: 17.9 % — HIGH (ref 11.5–14.5)
SAO2 % BLDA: 90 % — LOW (ref 94–98)
SODIUM SERPL-SCNC: 141 MMOL/L — SIGNIFICANT CHANGE UP (ref 135–146)
WBC # BLD: 15.85 K/UL — HIGH (ref 4.8–10.8)
WBC # FLD AUTO: 15.85 K/UL — HIGH (ref 4.8–10.8)

## 2018-03-06 RX ORDER — FUROSEMIDE 40 MG
40 TABLET ORAL
Qty: 0 | Refills: 0 | Status: DISCONTINUED | OUTPATIENT
Start: 2018-03-06 | End: 2018-03-07

## 2018-03-06 RX ORDER — FUROSEMIDE 40 MG
20 TABLET ORAL ONCE
Qty: 0 | Refills: 0 | Status: COMPLETED | OUTPATIENT
Start: 2018-03-06 | End: 2018-03-06

## 2018-03-06 RX ADMIN — MONTELUKAST 10 MILLIGRAM(S): 4 TABLET, CHEWABLE ORAL at 12:52

## 2018-03-06 RX ADMIN — Medication 80 MILLIGRAM(S): at 17:54

## 2018-03-06 RX ADMIN — Medication 50 MILLIGRAM(S): at 06:20

## 2018-03-06 RX ADMIN — Medication 3 MILLILITER(S): at 19:51

## 2018-03-06 RX ADMIN — TACROLIMUS 0.5 MILLIGRAM(S): 5 CAPSULE ORAL at 21:16

## 2018-03-06 RX ADMIN — Medication 80 MILLIGRAM(S): at 23:13

## 2018-03-06 RX ADMIN — Medication 3 MILLIGRAM(S): at 17:59

## 2018-03-06 RX ADMIN — TACROLIMUS 1 MILLIGRAM(S): 5 CAPSULE ORAL at 12:57

## 2018-03-06 RX ADMIN — Medication 500 MILLIGRAM(S): at 06:20

## 2018-03-06 RX ADMIN — Medication 650 MILLIGRAM(S): at 21:32

## 2018-03-06 RX ADMIN — AMLODIPINE BESYLATE 10 MILLIGRAM(S): 2.5 TABLET ORAL at 06:20

## 2018-03-06 RX ADMIN — Medication 650 MILLIGRAM(S): at 21:02

## 2018-03-06 RX ADMIN — Medication 10 MILLIGRAM(S): at 17:56

## 2018-03-06 RX ADMIN — PANTOPRAZOLE SODIUM 40 MILLIGRAM(S): 20 TABLET, DELAYED RELEASE ORAL at 12:53

## 2018-03-06 RX ADMIN — Medication 3 MILLILITER(S): at 08:46

## 2018-03-06 RX ADMIN — Medication 20 MILLIGRAM(S): at 14:47

## 2018-03-06 RX ADMIN — Medication 80 MILLIGRAM(S): at 12:43

## 2018-03-06 RX ADMIN — Medication 3 MILLILITER(S): at 14:55

## 2018-03-06 RX ADMIN — Medication 3 MILLIGRAM(S): at 06:28

## 2018-03-06 RX ADMIN — Medication 40 MILLIGRAM(S): at 17:54

## 2018-03-06 RX ADMIN — Medication 40 MILLIGRAM(S): at 06:22

## 2018-03-06 RX ADMIN — AMPICILLIN SODIUM AND SULBACTAM SODIUM 200 GRAM(S): 250; 125 INJECTION, POWDER, FOR SUSPENSION INTRAMUSCULAR; INTRAVENOUS at 06:20

## 2018-03-06 RX ADMIN — Medication 40 MILLIGRAM(S): at 12:42

## 2018-03-06 NOTE — CONSULT NOTE ADULT - SUBJECTIVE AND OBJECTIVE BOX
ELDER, DONI  73y, Female  Allergy: No Known Allergies      HPI:  Pt states she fell Monday and went for evaluation to [facility] and laceration was sutured and pt was discharged. Pt came back today for increasing pain in right LE. Patient denies calf pain, fevers, chest pain, SOB, abdominal pain, nausea, vomiting, diarrhea, dizziness, weakness. (27 Feb 2018 13:32)    FAMILY HISTORY:  No pertinent family history in first degree relatives    PAST MEDICAL & SURGICAL HISTORY:  Essential hypertension  Autoimmune hepatitis treated with steroids  Asthma  DVT (deep venous thrombosis)  No significant past surgical history        VITALS:  T(F): 98.9, Max: 98.9 (03-05-18 @ 23:41)  HR: 104  BP: 134/70  RR: 18Vital Signs Last 24 Hrs  T(C): 37.2 (05 Mar 2018 23:41), Max: 37.2 (05 Mar 2018 23:41)  T(F): 98.9 (05 Mar 2018 23:41), Max: 98.9 (05 Mar 2018 23:41)  HR: 104 (05 Mar 2018 23:41) (76 - 113)  BP: 134/70 (05 Mar 2018 23:41) (121/60 - 134/70)  BP(mean): 129 (05 Mar 2018 17:58) (129 - 129)  RR: 18 (05 Mar 2018 23:41) (18 - 22)  SpO2: 97% (05 Mar 2018 23:41) (90% - 97%)    TESTS & MEASUREMENTS:                        8.5    13.83 )-----------( 386      ( 05 Mar 2018 19:55 )             28.4     03-05    139  |  106  |  18  ----------------------------<  206<H>  3.7   |  20  |  1.4    Ca    8.2<L>      05 Mar 2018 19:55  Mg     2.0     03-05    TPro  5.4<L>  /  Alb  2.4<L>  /  TBili  0.7  /  DBili  x   /  AST  25  /  ALT  19  /  AlkPhos  93  03-05    LIVER FUNCTIONS - ( 05 Mar 2018 19:55 )  Alb: 2.4 g/dL / Pro: 5.4 g/dL / ALK PHOS: 93 U/L / ALT: 19 U/L / AST: 25 U/L / GGT: x             Culture - Surgical Swab (collected 02-28-18 @ 16:48)  Source: .Surgical Swab None  Final Report (03-05-18 @ 20:33):    Moderate Methicillin resistant Staphylococcus aureus    Few Corynebacterium species    Growth in fluid media only Alpha hemolytic strep  Organism: Methicillin resistant Staphylococcus aureus (03-05-18 @ 20:33)  Organism: Methicillin resistant Staphylococcus aureus (03-05-18 @ 20:33)      -  Ampicillin/Sulbactam: R 16/8      -  Cefazolin: R <=4      -  Ciprofloxacin: R >2      -  Clindamycin: S <=0.25      -  Daptomycin: S 1      -  Erythromycin: R >4      -  Gentamicin: S <=1      -  Levofloxacin: R >4      -  Linezolid: S 2      -  Moxifloxacin(Aerobic): R 2      -  Oxacillin: R >2      -  Penicillin: R >8      -  RIF- Rifampin: S <=1      -  Tetra/Doxy: S <=1      -  Trimethoprim/Sulfamethoxazole: S <=0.5/9.5      -  Vancomycin: S 2      Method Type: EROS    Culture - Other (collected 02-27-18 @ 14:44)  Source: .Other right lower extremity wound  Final Report (03-01-18 @ 18:39):    Moderate Methicillin resistant Staphylococcus aureus    Numerous Corynebacterium species    Moderate Alpha hemolytic strep  Organism: Methicillin resistant Staphylococcus aureus (03-01-18 @ 18:39)  Organism: Methicillin resistant Staphylococcus aureus (03-01-18 @ 18:39)      -  Ampicillin/Sulbactam: R 16/8      -  Cefazolin: R <=4      -  Ciprofloxacin: R >2      -  Clindamycin: S <=0.25      -  Daptomycin: S 1      -  Erythromycin: R >4      -  Gentamicin: S <=1      -  Levofloxacin: R >4      -  Linezolid: S 2      -  Moxifloxacin(Aerobic): R 2      -  Oxacillin: R >2      -  Penicillin: R >8      -  RIF- Rifampin: S <=1      -  Tetra/Doxy: S <=1      -  Trimethoprim/Sulfamethoxazole: S <=0.5/9.5      -  Vancomycin: S 2      Method Type: EROS    Culture - Blood (collected 02-27-18 @ 10:51)  Source: .Blood Blood  Final Report (03-05-18 @ 01:01):    No growth at 5 days.    Culture - Blood (collected 02-27-18 @ 10:51)  Source: .Blood Blood  Final Report (03-05-18 @ 01:01):    No growth at 5 days.            RADIOLOGY & ADDITIONAL TESTS:    ANTIBIOTICS:  ampicillin/sulbactam  IVPB 3 Gram(s) IV Intermittent every 6 hours  ciprofloxacin     Tablet 500 milliGRAM(s) Oral every 12 hours

## 2018-03-06 NOTE — DIETITIAN INITIAL EVALUATION ADULT. - PHYSICAL APPEARANCE
well nourished/overweight/obese/BMI 29.2; a/o; pt receiving O2 via facial mask. R lower extremity wound

## 2018-03-06 NOTE — PROGRESS NOTE ADULT - ASSESSMENT
74 yo female with hx of HTN, autoimmune hepatitis, asthma, DVT presented to the ED after rightleg wound. Had the [facility] sutured [facility] but then came back to the ED for worsening pain. She is s/p debridement with Burn team. Pt was transferred to medicine yesterday due to worsening SOB and requiring more O2.     # SOB secondary to Pulmonary Edema vs Interstitial lung disease   - Progressively worsening SOB and pt requiring venti mask for adequate oxygenation.    - CTA chest negative for PE.    - Pulm and ID following.    - c/w IV lasix and IV solumedrol.    - f/u 2D Echo   - f/u BNP and LDH.     # Hx of DVT and PE   - Pt has heterozygous prothrombin gene mutation and cause of unprovoked DVT and PE in March 2017.    - INR supratherapeutic yesterday.    - f/u INR today and dose coumadin.     # Right leg laceration s/p debridement   - wound care as per Burn.     DVT ppx - Coumadin  Dispo - home  Code - full

## 2018-03-06 NOTE — DIETITIAN INITIAL EVALUATION ADULT. - DIET TYPE
Pt reports good appetite and PO intake 80-90%. Notes she has occasional nausea in AM causing lesser PO at breakfast but it resolves throughout the day. likely 2/2 medication./regular

## 2018-03-06 NOTE — PROGRESS NOTE ADULT - ASSESSMENT
72 yo female with hx of HTN, autoimmune hepatitis, asthma, DVT presented to the ED after rightleg wound. Had the [facility] sutured [facility] but then came back to the ED for worsening pain. She is s/p debridement with Burn team. Pt was transferred to medicine yesterday due to worsening SOB and requiring more O2.     Acute Respiratory Failure,  Pulmonary Edema vs Interstitial lung disease   - Progressively worsening SOB and pt requiring venti mask for adequate oxygenation.    - CTA chest negative for PE.    - Pulm and ID following.    - given IV lasix and IV solumedrol.    - f/u 2D Echo   - f/u BNP and LDH   - case discussed with primary, Patient has been treated for PCP in the past and was treated with Bactrim    Hx of DVT and PE   - Pt has heterozygous prothrombin gene mutation and cause of unprovoked DVT and PE in March 2017.    - INR supratherapeutic     - CT failed to show clot    Right leg laceration s/p debridement   - wound care as per Burn.     DVT ppx - Coumadin  Dispo - home  Code - full

## 2018-03-06 NOTE — CHART NOTE - NSCHARTNOTEFT_GEN_A_CORE
74 yo female with history of HTN, autoimmune hepatitis, asthma, hx of DVT and PE presented initially for right leg laceration and s/p debridement of the laceration. Hospital course complicated by progressive respiratory distress. There is not fevers, chills, cough. Pt was complaining of SOB and was requiring progressively more O2 therapy. CXR was significant for bilateral interstitial opacities with right pleural effusion. For respiratory distress, CT Chest PE protocol was performed which was negative for PE. Otherwise CT chest significant for bilateral airspace opacity. Pt was evaluated by ID who recommended not ABX therapy at this time. Pt was also evaluated by Pulm and was started on IV lasix and IV solumedrol. Flu A/B and RVP panel pending. Possible need for intubation was discussed with patient and pt stated for now she would like to wait and if there is worsening of SOB/oxygen saturation then she would consider intubation.

## 2018-03-06 NOTE — CONSULT NOTE ADULT - SUBJECTIVE AND OBJECTIVE BOX
Patient is a 73y old  Female who presents with a chief complaint of right leg wound, needing debridement (04 Mar 2018 15:38)      HPI:  Pt states she fell Monday and went for evaluation to [facility] and laceration was sutured and pt was discharged. Pt came back today for increasing pain in right LE. Patient denies calf pain, fevers, chest pain, SOB, abdominal pain, nausea, vomiting, diarrhea, dizziness, weakness. (27 Feb 2018 13:32)      PAST MEDICAL & SURGICAL HISTORY:  Essential hypertension  Autoimmune hepatitis treated with steroids  Asthma  DVT (deep venous thrombosis)  No significant past surgical history      SOCIAL HX:   Smoking                             FAMILY HISTORY:  No pertinent family history in first degree relatives          Allergies    No Known Allergies            PHYSICAL EXAM  Vital Signs Last 24 Hrs  T(C): 36.6 (06 Mar 2018 08:19), Max: 37.2 (05 Mar 2018 23:41)  T(F): 97.9 (06 Mar 2018 08:19), Max: 98.9 (05 Mar 2018 23:41)  HR: 84 (06 Mar 2018 08:19) (84 - 113)  BP: 119/72 (06 Mar 2018 08:19) (119/72 - 134/70)  BP(mean): 129 (05 Mar 2018 17:58) (129 - 129)  RR: 18 (06 Mar 2018 08:19) (18 - 22)  SpO2: 97% (05 Mar 2018 23:41) (90% - 97%)    General:    HEENT: AAO x3            Lymph Nodes: No lymphadenapathy  Neck:  Supple  Lungs: Clear bilaterally  Cardiovascular: S1S2  Abdomen: Soft, non tender  Extremities: NO edema or cyanosis  Skin: Intact      03-05-18 @ 07:01  -  03-06-18 @ 07:00  --------------------------------------------------------  IN: 500 mL / OUT: 0 mL / NET: 500 mL          LAB:                        8.5    13.83 )-----------( 386      ( 05 Mar 2018 19:55 )             28.4                                               03-05    139  |  106  |  18  ----------------------------<  206<H>  3.7   |  20  |  1.4    Ca    8.2<L>      05 Mar 2018 19:55  Mg     2.0     03-05    TPro  5.4<L>  /  Alb  2.4<L>  /  TBili  0.7  /  DBili  x   /  AST  25  /  ALT  19  /  AlkPhos  93  03-05      PT/INR - ( 05 Mar 2018 19:55 )   PT: >40.00 sec;   INR: 4.38 ratio         PTT - ( 04 Mar 2018 13:19 )  PTT:29.4 sec                                                                                         LIVER FUNCTIONS - ( 05 Mar 2018 19:55 )  Alb: 2.4 g/dL / Pro: 5.4 g/dL / ALK PHOS: 93 U/L / ALT: 19 U/L / AST: 25 U/L / GGT: x                                                                                            ABG - ( 05 Mar 2018 05:00 )  pH: 7.50  /  pCO2: 29    /  pO2: 97    / HCO3: 23    / Base Excess: 0.1   /  SaO2: 98                  MEDICATIONS  (STANDING):  ALBUTerol    90 MICROgram(s) HFA Inhaler 1 Puff(s) Inhalation every 4 hours  ALBUTerol/ipratropium for Nebulization 3 milliLiter(s) Nebulizer every 6 hours  amLODIPine   Tablet 10 milliGRAM(s) Oral every 24 hours  buDESOnide    EC Capsule 3 milliGRAM(s) Oral two times a day  docusate sodium 100 milliGRAM(s) Oral three times a day  furosemide    Tablet 40 milliGRAM(s) Oral daily  metoprolol     tartrate 50 milliGRAM(s) Oral daily  montelukast 10 milliGRAM(s) Oral daily  pantoprazole   Suspension 40 milliGRAM(s) Oral daily  predniSONE   Tablet 10 milliGRAM(s) Oral every 24 hours  senna 2 Tablet(s) Oral at bedtime  tacrolimus 1 milliGRAM(s) Oral daily  tacrolimus 0.5 milliGRAM(s) Oral at bedtime  tiotropium 18 MICROgram(s) Capsule 1 Capsule(s) Inhalation daily    MEDICATIONS  (PRN):  acetaminophen   Tablet. 650 milliGRAM(s) Oral every 6 hours PRN Moderate Pain (4 - 6)  HYDROmorphone  Injectable 2 milliGRAM(s) IV Push four times a day PRN Severe Pain (7 - 10)  ondansetron Injectable 4 milliGRAM(s) IV Push every 8 hours PRN Nausea and/or Vomiting  oxyCODONE    5 mG/acetaminophen 325 mG 2 Tablet(s) Oral every 4 hours PRN Moderate Pain (4 - 6)  polyethylene glycol 3350 17 Gram(s) Oral daily PRN Constipation Patient is a 73y old  Female who presents with a chief complaint of right leg wound, needing debridement (04 Mar 2018 15:38)      HPI:  Pt states she fell Monday and went for evaluation to [facility] and laceration was sutured and pt was discharged. Pt came back today for increasing pain in right LE. Patient denies calf pain, fevers, chest pain, SOB, abdominal pain, nausea, vomiting, diarrhea, dizziness, weakness. (27 Feb 2018 13:32)      PAST MEDICAL & SURGICAL HISTORY:  Essential hypertension  Autoimmune hepatitis treated with steroids  Asthma  DVT (deep venous thrombosis)  No significant past surgical history      SOCIAL HX:   Smoking                             FAMILY HISTORY:  No pertinent family history in first degree relatives          Allergies    No Known Allergies            PHYSICAL EXAM  Vital Signs Last 24 Hrs  T(C): 36.6 (06 Mar 2018 08:19), Max: 37.2 (05 Mar 2018 23:41)  T(F): 97.9 (06 Mar 2018 08:19), Max: 98.9 (05 Mar 2018 23:41)  HR: 84 (06 Mar 2018 08:19) (84 - 113)  BP: 119/72 (06 Mar 2018 08:19) (119/72 - 134/70)  BP(mean): 129 (05 Mar 2018 17:58) (129 - 129)  RR: 18 (06 Mar 2018 08:19) (18 - 22)  SpO2: 97% (05 Mar 2018 23:41) (90% - 97%)    General:    HEENT: AAO x3            Lymph Nodes: No lymphadenapathy  Neck:  Supple  Lungs: Crackles bilaterally   Cardiovascular: S1S2  Abdomen: Soft, non tender  Extremities: NO edema or cyanosis  Skin: Intact  Neuro non focal     03-05-18 @ 07:01  -  03-06-18 @ 07:00  --------------------------------------------------------  IN: 500 mL / OUT: 0 mL / NET: 500 mL          LAB:                        8.5    13.83 )-----------( 386      ( 05 Mar 2018 19:55 )             28.4                                               03-05    139  |  106  |  18  ----------------------------<  206<H>  3.7   |  20  |  1.4    Ca    8.2<L>      05 Mar 2018 19:55  Mg     2.0     03-05    TPro  5.4<L>  /  Alb  2.4<L>  /  TBili  0.7  /  DBili  x   /  AST  25  /  ALT  19  /  AlkPhos  93  03-05      PT/INR - ( 05 Mar 2018 19:55 )   PT: >40.00 sec;   INR: 4.38 ratio         PTT - ( 04 Mar 2018 13:19 )  PTT:29.4 sec                                                                                         LIVER FUNCTIONS - ( 05 Mar 2018 19:55 )  Alb: 2.4 g/dL / Pro: 5.4 g/dL / ALK PHOS: 93 U/L / ALT: 19 U/L / AST: 25 U/L / GGT: x                                                                                            ABG - ( 05 Mar 2018 05:00 )  pH: 7.50  /  pCO2: 29    /  pO2: 97    / HCO3: 23    / Base Excess: 0.1   /  SaO2: 98                  MEDICATIONS  (STANDING):  ALBUTerol    90 MICROgram(s) HFA Inhaler 1 Puff(s) Inhalation every 4 hours  ALBUTerol/ipratropium for Nebulization 3 milliLiter(s) Nebulizer every 6 hours  amLODIPine   Tablet 10 milliGRAM(s) Oral every 24 hours  buDESOnide    EC Capsule 3 milliGRAM(s) Oral two times a day  docusate sodium 100 milliGRAM(s) Oral three times a day  furosemide    Tablet 40 milliGRAM(s) Oral daily  metoprolol     tartrate 50 milliGRAM(s) Oral daily  montelukast 10 milliGRAM(s) Oral daily  pantoprazole   Suspension 40 milliGRAM(s) Oral daily  predniSONE   Tablet 10 milliGRAM(s) Oral every 24 hours  senna 2 Tablet(s) Oral at bedtime  tacrolimus 1 milliGRAM(s) Oral daily  tacrolimus 0.5 milliGRAM(s) Oral at bedtime  tiotropium 18 MICROgram(s) Capsule 1 Capsule(s) Inhalation daily    MEDICATIONS  (PRN):  acetaminophen   Tablet. 650 milliGRAM(s) Oral every 6 hours PRN Moderate Pain (4 - 6)  HYDROmorphone  Injectable 2 milliGRAM(s) IV Push four times a day PRN Severe Pain (7 - 10)  ondansetron Injectable 4 milliGRAM(s) IV Push every 8 hours PRN Nausea and/or Vomiting  oxyCODONE    5 mG/acetaminophen 325 mG 2 Tablet(s) Oral every 4 hours PRN Moderate Pain (4 - 6)  polyethylene glycol 3350 17 Gram(s) Oral daily PRN Constipation

## 2018-03-06 NOTE — CONSULT NOTE ADULT - ASSESSMENT
RLE abscess with ORSA.  S/P debridement with no evidence of ongoing infection.  No cellulitis. No abscess. Does not need antibiotics.    Significant extensive bilaterally airspace opacities in lungs which appears to be chronic.  Pulmonary evaluation.  No antibiotics for now.

## 2018-03-06 NOTE — PROGRESS NOTE ADULT - SUBJECTIVE AND OBJECTIVE BOX
SUBJECTIVE:    Patient is a 73y old Female who presents with a chief complaint of right leg wound, needs debridement (04 Mar 2018 15:38).    Currently admitted to medicine with the primary diagnosis of Leg wound, right and SOB secondary to pulmonary edema vs interstitial lung disease.      Today is hospital day 7d. This morning she is resting in her bed. Pt complaining of SOB and on venti mask. She denies any nausea, chest pain, cough, diarrhea, or abdominal pain. She denies any fevers or chills.     PAST MEDICAL & SURGICAL HISTORY  Essential hypertension  Autoimmune hepatitis treated with steroids  Asthma  DVT (deep venous thrombosis)  No significant past surgical history    SOCIAL HISTORY:  Negative for smoking/alcohol/drug use.     ALLERGIES:  No Known Allergies    MEDICATIONS:  STANDING MEDICATIONS  ALBUTerol    90 MICROgram(s) HFA Inhaler 1 Puff(s) Inhalation every 4 hours  ALBUTerol/ipratropium for Nebulization 3 milliLiter(s) Nebulizer every 6 hours  amLODIPine   Tablet 10 milliGRAM(s) Oral every 24 hours  buDESOnide    EC Capsule 3 milliGRAM(s) Oral two times a day  docusate sodium 100 milliGRAM(s) Oral three times a day  furosemide   Injectable 40 milliGRAM(s) IV Push two times a day  methylPREDNISolone sodium succinate Injectable 80 milliGRAM(s) IV Push every 6 hours  metoprolol     tartrate 50 milliGRAM(s) Oral daily  montelukast 10 milliGRAM(s) Oral daily  pantoprazole   Suspension 40 milliGRAM(s) Oral daily  predniSONE   Tablet 10 milliGRAM(s) Oral every 24 hours  senna 2 Tablet(s) Oral at bedtime  tacrolimus 1 milliGRAM(s) Oral daily  tacrolimus 0.5 milliGRAM(s) Oral at bedtime  tiotropium 18 MICROgram(s) Capsule 1 Capsule(s) Inhalation daily    PRN MEDICATIONS  acetaminophen   Tablet. 650 milliGRAM(s) Oral every 6 hours PRN  HYDROmorphone  Injectable 2 milliGRAM(s) IV Push four times a day PRN  ondansetron Injectable 4 milliGRAM(s) IV Push every 8 hours PRN  oxyCODONE    5 mG/acetaminophen 325 mG 2 Tablet(s) Oral every 4 hours PRN  polyethylene glycol 3350 17 Gram(s) Oral daily PRN    VITALS:   T(F): 97.9  HR: 84  BP: 119/72  RR: 18  SpO2: 97%    LABS:                        8.0    15.85 )-----------( 403      ( 06 Mar 2018 11:46 )             26.3     03-06    141  |  107  |  15  ----------------------------<  100  3.6   |  25  |  1.2    Ca    8.2<L>      06 Mar 2018 11:46  Mg     2.0     03-05    TPro  5.4<L>  /  Alb  2.4<L>  /  TBili  0.7  /  DBili  x   /  AST  25  /  ALT  19  /  AlkPhos  93  03-05    PT/INR - ( 06 Mar 2018 12:02 )   PT: >40.00 sec;   INR: 4.59 ratio             ABG - ( 06 Mar 2018 12:00 )  pH: 7.58  /  pCO2: 29    /  pO2: 51    / HCO3: 27    / Base Excess: 4.8   /  SaO2: 90            RADIOLOGY:    < from: CT Chest w/ IV Cont (03.04.18 @ 22:56) >  No central pulmonary embolus.    Bilateral airspace opacities right greater than left and bilateral small   pleural effusions which may be seen with pulmonary edema. Multifocal   pneumonia is also in the differential. Follow-up to resolution is   recommended.    < end of copied text >      < from: Xray Chest 1 View-PORTABLE IMMEDIATE (03.06.18 @ 10:38) >  Since 3/2/2018, interval increase in bilateral, right greater than left   opacities with unchanged small bilateral effusions    < end of copied text >      PHYSICAL EXAM:  GEN: No acute distress  LUNGS: Crackles auscultated on the right lung fields.   HEART: S1/S2 present. RRR.   ABD: Soft, non-tender, non-distended. Bowel sounds present  EXT: Right leg dressing clean, dry, and intact.   NEURO: AAOX3

## 2018-03-06 NOTE — DIETITIAN INITIAL EVALUATION ADULT. - FACTORS AFF FOOD INTAKE
persistent diarrhea/persistent nausea/Pt notes occasional nausea in AM d/t medication and recent diarrhea. +LBM 3/6.

## 2018-03-06 NOTE — DIETITIAN INITIAL EVALUATION ADULT. - OTHER INFO
RD screen: LOS. Primary dx: R leg wound. Pt presented with c/o fall and increased pain in R lower extremity. Pt s/p wound debridement 2/28-burn/podiatry following. Pt experiencing SOB when ambulating-receiving O2 via facial mask. Dispo: d/c pending SOB. UBW stated by patient. Wt stable.

## 2018-03-06 NOTE — DIETITIAN INITIAL EVALUATION ADULT. - ENERGY NEEDS
total kcal = 0443-3108 kca/day (MSJ x 1.2-1.3); total protein = 55-65 g/day (1.0-1.2 g/kg ABW); total fluid = 1 mL : 1 kcal

## 2018-03-06 NOTE — PROGRESS NOTE ADULT - SUBJECTIVE AND OBJECTIVE BOX
DONI PATRICK  73y  Female  HPI:  Pt states she fell Monday and went for evaluation to [facility] and laceration was sutured and pt was discharged. Pt came back today for increasing pain in right LE. Patient denies calf pain, fevers, chest pain, SOB, abdominal pain, nausea, vomiting, diarrhea, dizziness, weakness. (27 Feb 2018 13:32)    MEDICATIONS  (STANDING):  ALBUTerol    90 MICROgram(s) HFA Inhaler 1 Puff(s) Inhalation every 4 hours  ALBUTerol/ipratropium for Nebulization 3 milliLiter(s) Nebulizer every 6 hours  amLODIPine   Tablet 10 milliGRAM(s) Oral every 24 hours  buDESOnide    EC Capsule 3 milliGRAM(s) Oral two times a day  docusate sodium 100 milliGRAM(s) Oral three times a day  furosemide   Injectable 40 milliGRAM(s) IV Push two times a day  methylPREDNISolone sodium succinate Injectable 80 milliGRAM(s) IV Push every 6 hours  metoprolol     tartrate 50 milliGRAM(s) Oral daily  montelukast 10 milliGRAM(s) Oral daily  pantoprazole   Suspension 40 milliGRAM(s) Oral daily  predniSONE   Tablet 10 milliGRAM(s) Oral every 24 hours  senna 2 Tablet(s) Oral at bedtime  tacrolimus 1 milliGRAM(s) Oral daily  tacrolimus 0.5 milliGRAM(s) Oral at bedtime  tiotropium 18 MICROgram(s) Capsule 1 Capsule(s) Inhalation daily    MEDICATIONS  (PRN):  acetaminophen   Tablet. 650 milliGRAM(s) Oral every 6 hours PRN Moderate Pain (4 - 6)  HYDROmorphone  Injectable 2 milliGRAM(s) IV Push four times a day PRN Severe Pain (7 - 10)  ondansetron Injectable 4 milliGRAM(s) IV Push every 8 hours PRN Nausea and/or Vomiting  oxyCODONE    5 mG/acetaminophen 325 mG 2 Tablet(s) Oral every 4 hours PRN Moderate Pain (4 - 6)  polyethylene glycol 3350 17 Gram(s) Oral daily PRN Constipation    INTERVAL EVENTS: Patient initially treated by burn service transferred to medicine with increasing SOB. Patient now requiring O2 supplement.    T(C): 36.9 (03-06-18 @ 19:00), Max: 37.2 (03-05-18 @ 23:41)  HR: 108 (03-06-18 @ 19:00) (84 - 108)  BP: 144/70 (03-06-18 @ 19:00) (119/72 - 144/70)  RR: 38 (03-06-18 @ 19:00) (18 - 38)  SpO2: 95% (03-06-18 @ 19:54) (88% - 99%)  Wt(kg): --Vital Signs Last 24 Hrs  T(C): 36.9 (06 Mar 2018 19:00), Max: 37.2 (05 Mar 2018 23:41)  T(F): 98.4 (06 Mar 2018 19:00), Max: 98.9 (05 Mar 2018 23:41)  HR: 108 (06 Mar 2018 19:00) (84 - 108)  BP: 144/70 (06 Mar 2018 19:00) (119/72 - 144/70)  BP(mean): 87 (06 Mar 2018 19:00) (87 - 87)  RR: 38 (06 Mar 2018 19:00) (18 - 38)  SpO2: 95% (06 Mar 2018 19:54) (88% - 99%)    PHYSICAL EXAM:  GENERAL: NAD on O2 mask  NECK: Supple, No JVD  CHEST/LUNG: Decreased bs , no wheezing  HEART: S1, S2, Regular rate and rhythm;   ABDOMEN: Soft, Nontender, Nondistended; Bowel sounds present  EXTREMITIES: No edema, RLE tender to touch, negative Oli sign  SKIN: wound noted    LABS:    DONI PATRICK  73y  Female  HPI:  Pt states she fell Monday and went for evaluation to [facility] and laceration was sutured and pt was discharged. Pt came back today for increasing pain in right LE. Patient denies calf pain, fevers, chest pain, SOB, abdominal pain, nausea, vomiting, diarrhea, dizziness, weakness. (27 Feb 2018 13:32)    MEDICATIONS  (STANDING):  ALBUTerol    90 MICROgram(s) HFA Inhaler 1 Puff(s) Inhalation every 4 hours  ALBUTerol/ipratropium for Nebulization 3 milliLiter(s) Nebulizer every 6 hours  amLODIPine   Tablet 10 milliGRAM(s) Oral every 24 hours  buDESOnide    EC Capsule 3 milliGRAM(s) Oral two times a day  docusate sodium 100 milliGRAM(s) Oral three times a day  furosemide   Injectable 40 milliGRAM(s) IV Push two times a day  methylPREDNISolone sodium succinate Injectable 80 milliGRAM(s) IV Push every 6 hours  metoprolol     tartrate 50 milliGRAM(s) Oral daily  montelukast 10 milliGRAM(s) Oral daily  pantoprazole   Suspension 40 milliGRAM(s) Oral daily  predniSONE   Tablet 10 milliGRAM(s) Oral every 24 hours  senna 2 Tablet(s) Oral at bedtime  tacrolimus 1 milliGRAM(s) Oral daily  tacrolimus 0.5 milliGRAM(s) Oral at bedtime  tiotropium 18 MICROgram(s) Capsule 1 Capsule(s) Inhalation daily    MEDICATIONS  (PRN):  acetaminophen   Tablet. 650 milliGRAM(s) Oral every 6 hours PRN Moderate Pain (4 - 6)  HYDROmorphone  Injectable 2 milliGRAM(s) IV Push four times a day PRN Severe Pain (7 - 10)  ondansetron Injectable 4 milliGRAM(s) IV Push every 8 hours PRN Nausea and/or Vomiting  oxyCODONE    5 mG/acetaminophen 325 mG 2 Tablet(s) Oral every 4 hours PRN Moderate Pain (4 - 6)  polyethylene glycol 3350 17 Gram(s) Oral daily PRN Constipation    INTERVAL EVENTS:    T(C): 36.9 (03-06-18 @ 19:00), Max: 37.2 (03-05-18 @ 23:41)  HR: 108 (03-06-18 @ 19:00) (84 - 108)  BP: 144/70 (03-06-18 @ 19:00) (119/72 - 144/70)  RR: 38 (03-06-18 @ 19:00) (18 - 38)  SpO2: 95% (03-06-18 @ 19:54) (88% - 99%)  Wt(kg): --Vital Signs Last 24 Hrs  T(C): 36.9 (06 Mar 2018 19:00), Max: 37.2 (05 Mar 2018 23:41)  T(F): 98.4 (06 Mar 2018 19:00), Max: 98.9 (05 Mar 2018 23:41)  HR: 108 (06 Mar 2018 19:00) (84 - 108)  BP: 144/70 (06 Mar 2018 19:00) (119/72 - 144/70)  BP(mean): 87 (06 Mar 2018 19:00) (87 - 87)  RR: 38 (06 Mar 2018 19:00) (18 - 38)  SpO2: 95% (06 Mar 2018 19:54) (88% - 99%)    PHYSICAL EXAM:  GENERAL:   NECK: Supple, No JVD, Normal thyroid  CHEST/LUNG: Clear; No crackles or wheezing  HEART: S1, S2, Regular rate and rhythm;   ABDOMEN: Soft, Nontender, Nondistended; Bowel sounds present  EXTREMITIES: No clubbing, cyanosis, or edema  SKIN: No rashes or lesions    LABS:  Labs:                        8.0    15.85 )-----------( 403      ( 06 Mar 2018 11:46 )             26.3             03-06    141  |  107  |  15  ----------------------------<  100  3.6   |  25  |  1.2    Ca    8.2<L>      06 Mar 2018 11:46  Mg     2.0     03-05    TPro  5.4<L>  /  Alb  2.4<L>  /  TBili  0.7  /  DBili  x   /  AST  25  /  ALT  19  /  AlkPhos  93  03-05    LIVER FUNCTIONS - ( 05 Mar 2018 19:55 )  Alb: 2.4 g/dL / Pro: 5.4 g/dL / ALK PHOS: 93 U/L / ALT: 19 U/L / AST: 25 U/L / GGT: x                 PT/INR - ( 06 Mar 2018 12:02 )   PT: >40.00 sec;   INR: 4.59 ratio           ABG - ( 06 Mar 2018 12:00 )  pH: 7.58  /  pCO2: 29    /  pO2: 51    / HCO3: 27    / Base Excess: 4.8   /  SaO2: 90          RADIOLOGY & ADDITIONAL TESTS:  < from: Xray Chest 1 View-PORTABLE IMMEDIATE (03.06.18 @ 10:38) >    Since 3/2/2018, interval increase in bilateral, right greater than left   opacities with unchanged small bilateral effusions    < end of copied text >  < from: CT Chest w/ IV Cont (03.04.18 @ 22:56) >    No central pulmonary embolus.    Bilateral airspace opacities right greater than left and bilateral small   pleural effusions which may be seen with pulmonary edema. Multifocal   pneumonia is also in the differential. Follow-up to resolution is   recommended.        < end of copied text >

## 2018-03-06 NOTE — CONSULT NOTE ADULT - ASSESSMENT
acute hypoxic respiratory failure  rule out pulmonary edema versus infectious or inflammatory lung disase  on chronic steroids and immunosuppressants for autoimmune hepatits    Plan:  solumedrol 80 mg Q6  Lasix 40 mg iv Q12  BNP Ldh  hold off on antibiotics  2 d echo  monitor i and o  DVT prophylaxis  will follow up. acute hypoxic respiratory failure   Pulmonary edema versus infectious or inflammatory infiltrates.    RO opportunistic infection   On chronic steroids and immunosuppressants for autoimmune hepatitis    Plan:  solumedrol 80 mg Q6  Lasix 40 mg iv Q12  BNP LDH  Rheum screen  Hold off on antibiotics  Pan cultures  2 d echo  monitor i and o  DVT prophylaxis  will follow up.  DW PCP, Patient , and son  The risk benefit ratio is  against bronchoscopy at this time.  Patient does not to take the risk of respiratory failure and MV post bronchoscopy.  IF worsening respiratory status, will need MV and bronchoscopy acute hypoxic respiratory failure   Pulmonary edema versus infectious or inflammatory infiltrates.    RO opportunistic infection   On chronic steroids and immunosuppressants for autoimmune hepatitis    Plan:  upgrade to icu  high flow oxygen to maintain sats >90%  solumedrol 80 mg Q6  Lasix 40 mg iv Q12  BNP LDH  Rheum screen  Hold off on antibiotics  Pan cultures  2 d echo  monitor i and o  DVT prophylaxis  patient was explanied about the need for intubation and bronchoscopy. However she is in favor of high flow at this time and intubation if needed later  will follow up.  DW PCP, Patient , and son  The risk benefit ratio is  against bronchoscopy at this time.  Patient does not to take the risk of respiratory failure and MV post bronchoscopy.  IF worsening respiratory status, will need MV and bronchoscopy

## 2018-03-07 LAB
ANION GAP SERPL CALC-SCNC: 11 MMOL/L — SIGNIFICANT CHANGE UP (ref 7–14)
ANION GAP SERPL CALC-SCNC: 13 MMOL/L — SIGNIFICANT CHANGE UP (ref 7–14)
ANION GAP SERPL CALC-SCNC: 13 MMOL/L — SIGNIFICANT CHANGE UP (ref 7–14)
BUN SERPL-MCNC: 18 MG/DL — SIGNIFICANT CHANGE UP (ref 10–20)
BUN SERPL-MCNC: 29 MG/DL — HIGH (ref 10–20)
BUN SERPL-MCNC: 31 MG/DL — HIGH (ref 10–20)
CALCIUM SERPL-MCNC: 7.9 MG/DL — LOW (ref 8.5–10.1)
CALCIUM SERPL-MCNC: 8 MG/DL — LOW (ref 8.5–10.1)
CALCIUM SERPL-MCNC: 8.1 MG/DL — LOW (ref 8.5–10.1)
CHLORIDE SERPL-SCNC: 100 MMOL/L — SIGNIFICANT CHANGE UP (ref 98–110)
CHLORIDE SERPL-SCNC: 102 MMOL/L — SIGNIFICANT CHANGE UP (ref 98–110)
CHLORIDE SERPL-SCNC: 104 MMOL/L — SIGNIFICANT CHANGE UP (ref 98–110)
CO2 SERPL-SCNC: 22 MMOL/L — SIGNIFICANT CHANGE UP (ref 17–32)
CO2 SERPL-SCNC: 24 MMOL/L — SIGNIFICANT CHANGE UP (ref 17–32)
CO2 SERPL-SCNC: 25 MMOL/L — SIGNIFICANT CHANGE UP (ref 17–32)
CREAT SERPL-MCNC: 1.4 MG/DL — SIGNIFICANT CHANGE UP (ref 0.7–1.5)
CREAT SERPL-MCNC: 1.6 MG/DL — HIGH (ref 0.7–1.5)
CREAT SERPL-MCNC: 1.7 MG/DL — HIGH (ref 0.7–1.5)
CRP SERPL-MCNC: 12.4 MG/DL — HIGH (ref 0–0.4)
GAS PNL BLDA: SIGNIFICANT CHANGE UP
GLUCOSE SERPL-MCNC: 201 MG/DL — HIGH (ref 70–110)
GLUCOSE SERPL-MCNC: 220 MG/DL — HIGH (ref 70–110)
GLUCOSE SERPL-MCNC: 231 MG/DL — HIGH (ref 70–110)
HCT VFR BLD CALC: 27.6 % — LOW (ref 37–47)
HGB BLD-MCNC: 8.5 G/DL — LOW (ref 12–16)
INR BLD: 3.85 RATIO — HIGH (ref 0.65–1.3)
MAGNESIUM SERPL-MCNC: 2 MG/DL — SIGNIFICANT CHANGE UP (ref 1.8–2.4)
MCHC RBC-ENTMCNC: 22 PG — LOW (ref 27–31)
MCHC RBC-ENTMCNC: 30.8 G/DL — LOW (ref 32–37)
MCV RBC AUTO: 71.3 FL — LOW (ref 81–99)
NRBC # BLD: 0 /100 WBCS — SIGNIFICANT CHANGE UP (ref 0–0)
PLATELET # BLD AUTO: 382 K/UL — SIGNIFICANT CHANGE UP (ref 130–400)
POTASSIUM SERPL-MCNC: 3.3 MMOL/L — LOW (ref 3.5–5)
POTASSIUM SERPL-MCNC: 3.8 MMOL/L — SIGNIFICANT CHANGE UP (ref 3.5–5)
POTASSIUM SERPL-MCNC: 4.3 MMOL/L — SIGNIFICANT CHANGE UP (ref 3.5–5)
POTASSIUM SERPL-SCNC: 3.3 MMOL/L — LOW (ref 3.5–5)
POTASSIUM SERPL-SCNC: 3.8 MMOL/L — SIGNIFICANT CHANGE UP (ref 3.5–5)
POTASSIUM SERPL-SCNC: 4.3 MMOL/L — SIGNIFICANT CHANGE UP (ref 3.5–5)
PROTHROM AB SERPL-ACNC: >40 SEC — HIGH (ref 9.95–12.87)
RAPID RVP RESULT: SIGNIFICANT CHANGE UP
RBC # BLD: 3.87 M/UL — LOW (ref 4.2–5.4)
RBC # FLD: 17.9 % — HIGH (ref 11.5–14.5)
SODIUM SERPL-SCNC: 136 MMOL/L — SIGNIFICANT CHANGE UP (ref 135–146)
SODIUM SERPL-SCNC: 137 MMOL/L — SIGNIFICANT CHANGE UP (ref 135–146)
SODIUM SERPL-SCNC: 141 MMOL/L — SIGNIFICANT CHANGE UP (ref 135–146)
WBC # BLD: 12.15 K/UL — HIGH (ref 4.8–10.8)
WBC # FLD AUTO: 12.15 K/UL — HIGH (ref 4.8–10.8)

## 2018-03-07 RX ORDER — POTASSIUM CHLORIDE 20 MEQ
40 PACKET (EA) ORAL ONCE
Qty: 0 | Refills: 0 | Status: COMPLETED | OUTPATIENT
Start: 2018-03-07 | End: 2018-03-07

## 2018-03-07 RX ORDER — METOPROLOL TARTRATE 50 MG
25 TABLET ORAL
Qty: 0 | Refills: 0 | Status: DISCONTINUED | OUTPATIENT
Start: 2018-03-07 | End: 2018-03-23

## 2018-03-07 RX ADMIN — Medication 50 MILLIGRAM(S): at 05:52

## 2018-03-07 RX ADMIN — MONTELUKAST 10 MILLIGRAM(S): 4 TABLET, CHEWABLE ORAL at 11:55

## 2018-03-07 RX ADMIN — Medication 40 MILLIEQUIVALENT(S): at 06:59

## 2018-03-07 RX ADMIN — TACROLIMUS 1 MILLIGRAM(S): 5 CAPSULE ORAL at 11:55

## 2018-03-07 RX ADMIN — Medication 25 MILLIGRAM(S): at 19:02

## 2018-03-07 RX ADMIN — Medication 80 MILLIGRAM(S): at 11:55

## 2018-03-07 RX ADMIN — Medication 650 MILLIGRAM(S): at 15:21

## 2018-03-07 RX ADMIN — Medication 80 MILLIGRAM(S): at 05:51

## 2018-03-07 RX ADMIN — Medication 3 MILLILITER(S): at 20:14

## 2018-03-07 RX ADMIN — AMLODIPINE BESYLATE 10 MILLIGRAM(S): 2.5 TABLET ORAL at 05:52

## 2018-03-07 RX ADMIN — Medication 650 MILLIGRAM(S): at 05:57

## 2018-03-07 RX ADMIN — Medication 3 MILLILITER(S): at 14:53

## 2018-03-07 RX ADMIN — Medication 650 MILLIGRAM(S): at 05:50

## 2018-03-07 RX ADMIN — Medication 40 MILLIGRAM(S): at 05:51

## 2018-03-07 RX ADMIN — Medication 80 MILLIGRAM(S): at 19:02

## 2018-03-07 RX ADMIN — Medication 3 MILLILITER(S): at 08:02

## 2018-03-07 RX ADMIN — PANTOPRAZOLE SODIUM 40 MILLIGRAM(S): 20 TABLET, DELAYED RELEASE ORAL at 11:55

## 2018-03-07 NOTE — PROGRESS NOTE ADULT - ASSESSMENT
IMPRESSION:    AHRF Inflammatory vs infectious infiltrate?  HO autoimmune hepatitis     PLAN:    CNS: no depressants    HEENT: Oral care    PULMONARY:  HOB @ 45 degrees.  Wean O2 not flow     CARDIOVASCULAR:  I = O     GI: GI prophylaxis.  Feeding     RENAL:  Follow up lytes.  Correct as needed    INFECTIOUS DISEASE: Follow up cultures    HEMATOLOGICAL:  DVT prophylaxis.    ENDOCRINE:  Follow up FS.  Insulin protocol if needed.    MUSCULOSKELETAL:    Continue Solumedrol.  Complement levels.  Cryoglobulin levels.  ESR.  Low threshold for intubation.  Keep ICU monitoring   KATHARINA Hogan IMPRESSION:    AHRF Inflammatory vs infectious infiltrate?  HO autoimmune hepatitis     PLAN:    CNS: no depressants    HEENT: Oral care    PULMONARY:  HOB @ 45 degrees.  Wean O2 not flow     CARDIOVASCULAR:  I = O     GI: GI prophylaxis.  Feeding     RENAL:  Follow up lytes.  Correct as needed    INFECTIOUS DISEASE: Follow up cultures    HEMATOLOGICAL:  DVT prophylaxis.    ENDOCRINE:  Follow up FS.  Insulin protocol if needed.    MUSCULOSKELETAL:    Continue Solumedrol.  Complement levels.  Cryoglobulin levels.  ESR.  Low threshold for intubation.  Keep ICU monitoring   DC Hogan   Prograf level.  Hold Prograf and FU with GI

## 2018-03-07 NOTE — CONSULT NOTE ADULT - ASSESSMENT
Patient is a 73y old  Female who presented initially for RLE wound s/p debridement with + ORSA. No abscess was found as per ID so ABx were d/c. Course was complicated by respiratory distress suspected to be caused by Tacrolimus being given for autoimmune hepatitis.      *Autoimmune hepatitis   On Tacrolimus 1 mg am and 0.5 mg pm   On Prednisone 10 mg od   Check Tacrolimus level   Monitor LFTs, INR, MELD-Na score Patient is a 73y old  Female who presented initially for RLE wound s/p debridement with + ORSA. No abscess was found as per ID so ABx were d/c. Course was complicated by respiratory distress suspected to be caused by Tacrolimus being given for autoimmune hepatitis.      *Autoimmune hepatitis   On Tacrolimus 1 mg am and 0.5 mg pm  And Prednisone 10 mg od   And Budesonide 3 mg po q12h   Check Tacrolimus level   Monitor daily LFTs, INR, MELD-Na score  Get records from [place] Patient is a 73y old  Female who presented initially for RLE wound s/p debridement with + ORSA. No abscess was found as per ID so ABx were d/c. Course was complicated by respiratory distress suspected to be caused by Tacrolimus being given for autoimmune hepatitis.      *Autoimmune hepatitis   At home: On Tacrolimus 1 mg am and 0.5 mg pm  And Prednisone 10 mg od   And Budesonide 3 mg po q12h  Currently steroids shifted to solumedrol 80 mg iv q6h     Check Tacrolimus level   check us abdomen   Monitor daily LFTs, INR, MELD-Na score  Get records from [place] Patient is a 73y old  Female who presented initially for RLE wound s/p debridement with + ORSA. No abscess was found as per ID so ABx were d/c. Course was complicated by respiratory distress suspected to be caused by Tacrolimus being given for autoimmune hepatitis.      *Autoimmune hepatitis   At home: On Tacrolimus 1 mg am and 0.5 mg pm  And Prednisone 10 mg od   And Budesonide 3 mg po q12h  Currently steroids shifted to solumedrol 80 mg iv q6h     Check Tacrolimus level   check us abdomen   Monitor daily LFTs, INR, MELD-Na score  Get records from [place]   Without good evidence of pulmonary toxicity , would continue treating her with Medication for AIH

## 2018-03-07 NOTE — PROGRESS NOTE ADULT - SUBJECTIVE AND OBJECTIVE BOX
Chart reviewed, pt examined. Pt known to me from her prior admit (DVT/PE); Consults and CC note reviewed.    LENGTH OF HOSPITAL STAY:  HD#9, CCD#2, POD#3;    CHIEF COMPLAINT:Patient is a 73y old  Female who presents with a chief complaint of right leg wound, needed debridement (04 Mar 2018 15:38), had SOB on the burn svc   2D ago, transferred to Medicine.     HPI:HPI:  Pt states she fell Monday and went for evaluation to [facility] and laceration was sutured and pt was discharged. Pt came back today for increasing pain in right LE. Patient denies calf pain, fevers, chest pain, SOB, abdominal pain, nausea, vomiting, diarrhea, dizziness, weakness. (27 Feb 2018 13:32)    OVERNIGHT EVENTS/INTERVAL UPDATES: Pt more SOB; intubation considered, pt now on Hi Flow O2-40%    PMH & PSH  PAST MEDICAL & SURGICAL HISTORY:  Essential hypertension  Autoimmune hepatitis treated with steroids  Asthma  DVT (deep venous thrombosis)/PE  No significant past surgical history;    ROS: no fever, slight cough over last week; no bleeding; at baseline-min LAND; no CP      SOCIAL HISTORY: Negative; -lives w     ALLERGIES: No Known Allergies    HOME MEDICATIONS  Home Medications:  amLODIPine 10 mg oral tablet: 1 tab(s) orally once a day (27 Feb 2018 13:44)  budesonide 3 mg oral capsule, extended release: 1 cap(s) orally 2 times a day (27 Feb 2018 22:03)  metoprolol tartrate 50 mg oral tablet: 1 tab(s) orally once a day (27 Feb 2018 13:44)  predniSONE 10 mg oral tablet: 1 tab(s) orally once a day (27 Feb 2018 13:44)  Singulair 10 mg oral tablet: 1 tab(s) orally once a day (27 Feb 2018 13:44)  tacrolimus 0.5 mg oral capsule: 1 cap(s) orally once a day (at bedtime) (27 Feb 2018 22:01)  tacrolimus 1 mg oral capsule: orally once a day in the morning  (27 Feb 2018 22:00)    PHYSICAL EXAM:  T(F): 98.1 (03-07-18 @ 08:00), Max: 98.9 (03-07-18 @ 00:00)  HR: 88 (03-07-18 @ 12:00)  BP: 118/70 (03-07-18 @ 12:00)  RR: 24 (03-07-18 @ 12:00)  SpO2: 97% (03-07-18 @ 12:00)  CAPILLARY BLOOD GLUCOSE          03-06-18 @ 07:01  -  03-07-18 @ 07:00  --------------------------------------------------------  IN:    Oral Fluid: 240 mL  Total IN: 240 mL    OUT:    Indwelling Catheter - Urethral: 1095 mL  Total OUT: 1095 mL    Total NET: -855 mL      03-07-18 @ 07:01  -  03-07-18 @ 14:15  --------------------------------------------------------  IN:    Oral Fluid: 450 mL  Total IN: 450 mL    OUT:    Indwelling Catheter - Urethral: 200 mL  Total OUT: 200 mL    Total NET: 250 mL    General: WD/WN, good spirits; NARD; Hi Flow O2 on  HEENT: NCAT  CV: RRR  RESP: bilateral crackles at bases; no increased respiratory effort on nasal cannula; no wheezes  Abdominal: Soft, NTTP  Extremity: no c/c/e; +ecchymoses; RLE-surgical wound-dressed & wrapped  Skin: many ecchymoses-all over UE; slight avulsion-RUE  Neuro: A&O x3; gen weak +1    MEDICATIONS  STANDING MEDICATIONS  ALBUTerol    90 MICROgram(s) HFA Inhaler 1 Puff(s) Inhalation every 4 hours  ALBUTerol/ipratropium for Nebulization 3 milliLiter(s) Nebulizer every 6 hours  amLODIPine   Tablet 10 milliGRAM(s) Oral every 24 hours  docusate sodium 100 milliGRAM(s) Oral three times a day  methylPREDNISolone sodium succinate Injectable 80 milliGRAM(s) IV Push every 6 hours  metoprolol     tartrate 25 milliGRAM(s) Oral two times a day  montelukast 10 milliGRAM(s) Oral daily  pantoprazole   Suspension 40 milliGRAM(s) Oral daily  senna 2 Tablet(s) Oral at bedtime  tacrolimus 1 milliGRAM(s) Oral daily  tacrolimus 0.5 milliGRAM(s) Oral at bedtime  tiotropium 18 MICROgram(s) Capsule 1 Capsule(s) Inhalation daily    PRN MEDICATIONS  acetaminophen   Tablet. 650 milliGRAM(s) Oral every 6 hours PRN  HYDROmorphone  Injectable 2 milliGRAM(s) IV Push four times a day PRN  ondansetron Injectable 4 milliGRAM(s) IV Push every 8 hours PRN  oxyCODONE    5 mG/acetaminophen 325 mG 2 Tablet(s) Oral every 4 hours PRN  polyethylene glycol 3350 17 Gram(s) Oral daily PRN    LABS:                        8.5    12.15 )-----------( 382      ( 07 Mar 2018 04:18 )             27.6              03-07    141  |  104  |  18  ----------------------------<  201<H>  3.3<L>   |  24  |  1.4    Ca    8.0<L>      07 Mar 2018 04:18  Mg     2.0     03-05    TPro  5.4<L>  /  Alb  2.4<L>  /  TBili  0.7  /  DBili  x   /  AST  25  /  ALT  19  /  AlkPhos  93  03-05    LIVER FUNCTIONS - ( 05 Mar 2018 19:55 )  Alb: 2.4 g/dL / Pro: 5.4 g/dL / ALK PHOS: 93 U/L / ALT: 19 U/L / AST: 25 U/L / GGT: x                      PT/INR - ( 07 Mar 2018 04:18 )   PT: >40.00 sec;   INR: 3.85 ratio                           ABG - ( 07 Mar 2018 04:37 )  pH: 7.55  /  pCO2: 32    /  pO2: 55    / HCO3: 28    / Base Excess: 5.5   /  SaO2: 91                    IMAGING:      ASSESSMENT & PLANS: ARF w Bilateral opacities-CC/Pulm, & ID on case.    CNS: monitor mental status    HEENT: oral care    PULMONARY: continue solumedrol; monitor respiratory status, intubation if needed; see below;       Acute respiratory Failure; CT noted;     CARDIOVASCULAR: continue amlodipine, monitor VS    GI: f/u GI consult for autoimmune hepatitis; monitor tacrolimus level    RENAL: monitor lytes    INFECTIOUS DISEASE: f/u cx; see ID-possible opportunistic Pneumonitis vs inflammatory/Drug Rxn; have Burn f/u on leg wound    HEMATOLOGICAL: cryoglobin, ESR, complement, monitor cbc     ENDOCRINE: monitor blood glucose    MUSCULOSKELETAL: DVT prophylaxis

## 2018-03-07 NOTE — PROGRESS NOTE ADULT - SUBJECTIVE AND OBJECTIVE BOX
Over Night Events:  Feels better.  on High flow.  50 / 40. NO SOB at rest.  No cough or exopectorations        ROS:  See HPI    PHYSICAL EXAM    ICU Vital Signs Last 24 Hrs  T(C): 36.6 (07 Mar 2018 04:00), Max: 37.2 (07 Mar 2018 00:00)  T(F): 97.8 (07 Mar 2018 04:00), Max: 98.9 (07 Mar 2018 00:00)  HR: 86 (07 Mar 2018 07:30) (76 - 120)  BP: 119/70 (07 Mar 2018 07:30) (116/69 - 163/81)  BP(mean): 95 (07 Mar 2018 07:30) (85 - 105)  ABP: --  ABP(mean): --  RR: 36 (07 Mar 2018 07:30) (13 - 45)  SpO2: 94% (07 Mar 2018 07:30) (88% - 99%)      General:  In NAD  HEENT: LUIS A             Lymph Nodes: No cervical LN   Lungs: Bilateral BS  Cardiovascular: Regular   Abdomen: Soft, Positive BS  Extremities: No clubbing   Skin: Warm  Neurological: Non focal       03-06-18 @ 07:01  -  03-07-18 @ 07:00  --------------------------------------------------------  IN:    Oral Fluid: 240 mL  Total IN: 240 mL    OUT:    Indwelling Catheter - Urethral: 1095 mL  Total OUT: 1095 mL    Total NET: -855 mL          LABS:                            8.5    12.15 )-----------( 382      ( 07 Mar 2018 04:18 )             27.6                                               03-07    141  |  104  |  18  ----------------------------<  201<H>  3.3<L>   |  24  |  1.4    Ca    8.0<L>      07 Mar 2018 04:18  Mg     2.0     03-05    TPro  5.4<L>  /  Alb  2.4<L>  /  TBili  0.7  /  DBili  x   /  AST  25  /  ALT  19  /  AlkPhos  93  03-05      PT/INR - ( 07 Mar 2018 04:18 )   PT: >40.00 sec;   INR: 3.85 ratio                                                                                              LIVER FUNCTIONS - ( 05 Mar 2018 19:55 )  Alb: 2.4 g/dL / Pro: 5.4 g/dL / ALK PHOS: 93 U/L / ALT: 19 U/L / AST: 25 U/L / GGT: x                                                                                                                                   ABG - ( 07 Mar 2018 04:37 )  pH: 7.55  /  pCO2: 32    /  pO2: 55    / HCO3: 28    / Base Excess: 5.5   /  SaO2: 91                  MEDICATIONS  (STANDING):  ALBUTerol    90 MICROgram(s) HFA Inhaler 1 Puff(s) Inhalation every 4 hours  ALBUTerol/ipratropium for Nebulization 3 milliLiter(s) Nebulizer every 6 hours  amLODIPine   Tablet 10 milliGRAM(s) Oral every 24 hours  buDESOnide    EC Capsule 3 milliGRAM(s) Oral two times a day  docusate sodium 100 milliGRAM(s) Oral three times a day  furosemide   Injectable 40 milliGRAM(s) IV Push two times a day  methylPREDNISolone sodium succinate Injectable 80 milliGRAM(s) IV Push every 6 hours  metoprolol     tartrate 50 milliGRAM(s) Oral daily  montelukast 10 milliGRAM(s) Oral daily  pantoprazole   Suspension 40 milliGRAM(s) Oral daily  predniSONE   Tablet 10 milliGRAM(s) Oral every 24 hours  senna 2 Tablet(s) Oral at bedtime  tacrolimus 1 milliGRAM(s) Oral daily  tacrolimus 0.5 milliGRAM(s) Oral at bedtime  tiotropium 18 MICROgram(s) Capsule 1 Capsule(s) Inhalation daily    MEDICATIONS  (PRN):  acetaminophen   Tablet. 650 milliGRAM(s) Oral every 6 hours PRN Moderate Pain (4 - 6)  HYDROmorphone  Injectable 2 milliGRAM(s) IV Push four times a day PRN Severe Pain (7 - 10)  ondansetron Injectable 4 milliGRAM(s) IV Push every 8 hours PRN Nausea and/or Vomiting  oxyCODONE    5 mG/acetaminophen 325 mG 2 Tablet(s) Oral every 4 hours PRN Moderate Pain (4 - 6)  polyethylene glycol 3350 17 Gram(s) Oral daily PRN Constipation      Xrays:     Improved infiltrates                                                                                 ECHO

## 2018-03-07 NOTE — PROGRESS NOTE ADULT - ASSESSMENT
RLE abscess with ORSA.  S/P debridement with no evidence of ongoing infection.  No cellulitis. No abscess. Does not need antibiotics.    Significant extensive bilaterally airspace opacities in lungs which appears to be chronic.  Pulmonary evaluation read. Differential diagnosis is obviously broad  No antibiotics for now.  F/U with pulmonary.    recall prn please.

## 2018-03-07 NOTE — PROGRESS NOTE ADULT - SUBJECTIVE AND OBJECTIVE BOX
PGY I NOTE    LENGTH OF HOSPITAL STAY:  8d    CHIEF COMPLAINT:Patient is a 73y old  Female who presents with a chief complaint of right leg wound, needs debridement (04 Mar 2018 15:38)    HPI:HPI:  Pt states she fell Monday and went for evaluation to [facility] and laceration was sutured and pt was discharged. Pt came back today for increasing pain in right LE. Patient denies calf pain, fevers, chest pain, SOB, abdominal pain, nausea, vomiting, diarrhea, dizziness, weakness. (27 Feb 2018 13:32)    OVERNIGHT EVENTS/INTERVAL UPDATES:    PMH & PSH  PAST MEDICAL & SURGICAL HISTORY:  Essential hypertension  Autoimmune hepatitis treated with steroids  Asthma  DVT (deep venous thrombosis)  No significant past surgical history    SOCIAL HISTORY: Negative    ALLERGIES: No Known Allergies    HOME MEDICATIONS  Home Medications:  amLODIPine 10 mg oral tablet: 1 tab(s) orally once a day (27 Feb 2018 13:44)  budesonide 3 mg oral capsule, extended release: 1 cap(s) orally 2 times a day (27 Feb 2018 22:03)  metoprolol tartrate 50 mg oral tablet: 1 tab(s) orally once a day (27 Feb 2018 13:44)  predniSONE 10 mg oral tablet: 1 tab(s) orally once a day (27 Feb 2018 13:44)  Singulair 10 mg oral tablet: 1 tab(s) orally once a day (27 Feb 2018 13:44)  tacrolimus 0.5 mg oral capsule: 1 cap(s) orally once a day (at bedtime) (27 Feb 2018 22:01)  tacrolimus 1 mg oral capsule: orally once a day in the morning  (27 Feb 2018 22:00)    PHYSICAL EXAM:  T(F): 98.1 (03-07-18 @ 08:00), Max: 98.9 (03-07-18 @ 00:00)  HR: 88 (03-07-18 @ 12:00)  BP: 118/70 (03-07-18 @ 12:00)  RR: 24 (03-07-18 @ 12:00)  SpO2: 97% (03-07-18 @ 12:00)  CAPILLARY BLOOD GLUCOSE          03-06-18 @ 07:01  -  03-07-18 @ 07:00  --------------------------------------------------------  IN:    Oral Fluid: 240 mL  Total IN: 240 mL    OUT:    Indwelling Catheter - Urethral: 1095 mL  Total OUT: 1095 mL    Total NET: -855 mL      03-07-18 @ 07:01  -  03-07-18 @ 14:15  --------------------------------------------------------  IN:    Oral Fluid: 450 mL  Total IN: 450 mL    OUT:    Indwelling Catheter - Urethral: 200 mL  Total OUT: 200 mL    Total NET: 250 mL            General: NAD  HEENT: NCAT  CV: RRR  RESP: bilateral crackles at bases; no increased respiratory effort on nasal cannula  Abdominal: Soft, NTTP  Extremity: no c/c/e  Neuro: A&O x3    MEDICATIONS  STANDING MEDICATIONS  ALBUTerol    90 MICROgram(s) HFA Inhaler 1 Puff(s) Inhalation every 4 hours  ALBUTerol/ipratropium for Nebulization 3 milliLiter(s) Nebulizer every 6 hours  amLODIPine   Tablet 10 milliGRAM(s) Oral every 24 hours  docusate sodium 100 milliGRAM(s) Oral three times a day  methylPREDNISolone sodium succinate Injectable 80 milliGRAM(s) IV Push every 6 hours  metoprolol     tartrate 25 milliGRAM(s) Oral two times a day  montelukast 10 milliGRAM(s) Oral daily  pantoprazole   Suspension 40 milliGRAM(s) Oral daily  senna 2 Tablet(s) Oral at bedtime  tacrolimus 1 milliGRAM(s) Oral daily  tacrolimus 0.5 milliGRAM(s) Oral at bedtime  tiotropium 18 MICROgram(s) Capsule 1 Capsule(s) Inhalation daily    PRN MEDICATIONS  acetaminophen   Tablet. 650 milliGRAM(s) Oral every 6 hours PRN  HYDROmorphone  Injectable 2 milliGRAM(s) IV Push four times a day PRN  ondansetron Injectable 4 milliGRAM(s) IV Push every 8 hours PRN  oxyCODONE    5 mG/acetaminophen 325 mG 2 Tablet(s) Oral every 4 hours PRN  polyethylene glycol 3350 17 Gram(s) Oral daily PRN    LABS:                        8.5    12.15 )-----------( 382      ( 07 Mar 2018 04:18 )             27.6              03-07    141  |  104  |  18  ----------------------------<  201<H>  3.3<L>   |  24  |  1.4    Ca    8.0<L>      07 Mar 2018 04:18  Mg     2.0     03-05    TPro  5.4<L>  /  Alb  2.4<L>  /  TBili  0.7  /  DBili  x   /  AST  25  /  ALT  19  /  AlkPhos  93  03-05    LIVER FUNCTIONS - ( 05 Mar 2018 19:55 )  Alb: 2.4 g/dL / Pro: 5.4 g/dL / ALK PHOS: 93 U/L / ALT: 19 U/L / AST: 25 U/L / GGT: x                      PT/INR - ( 07 Mar 2018 04:18 )   PT: >40.00 sec;   INR: 3.85 ratio                           ABG - ( 07 Mar 2018 04:37 )  pH: 7.55  /  pCO2: 32    /  pO2: 55    / HCO3: 28    / Base Excess: 5.5   /  SaO2: 91                    IMAGING:      ASSESSMENT & PLANS:    CNS: monitor mental status    HEENT: oral care    PULMONARY: continue solumedrol; monitor respiratory status, intubation if needed    CARDIOVASCULAR: continue amlodipine, monitor VS    GI: f/u GI consult for autoimmune hepatitis; monitor tacrolimus level    RENAL: monitor lytes    INFECTIOUS DISEASE: f/u cx    HEMATOLOGICAL: cryoglobin, ESR, complement, monitor cbc     ENDOCRINE: monitor blood glucose    MUSCULOSKELETAL: DVT prophylaxis

## 2018-03-07 NOTE — CONSULT NOTE ADULT - SUBJECTIVE AND OBJECTIVE BOX
Patient is a 73y old  Female who presents with a chief complaint of right leg wound, needs debridement (04 Mar 2018 15:38)      HPI:  Pt states she fell Monday and went for evaluation to [facility] and laceration was sutured and pt was discharged. Pt came back today for increasing pain in right LE. Patient denies calf pain, fevers, chest pain, SOB, abdominal pain, nausea, vomiting, diarrhea, dizziness, weakness. (27 Feb 2018 13:32)      ROS wnl      PAST MEDICAL & SURGICAL HISTORY:  Essential hypertension  Autoimmune hepatitis treated with steroids  Asthma  DVT (deep venous thrombosis)  No significant past surgical history      Home Medications:  amLODIPine 10 mg oral tablet: 1 tab(s) orally once a day (27 Feb 2018 13:44)  budesonide 3 mg oral capsule, extended release: 1 cap(s) orally 2 times a day (27 Feb 2018 22:03)  metoprolol tartrate 50 mg oral tablet: 1 tab(s) orally once a day (27 Feb 2018 13:44)  predniSONE 10 mg oral tablet: 1 tab(s) orally once a day (27 Feb 2018 13:44)  Singulair 10 mg oral tablet: 1 tab(s) orally once a day (27 Feb 2018 13:44)  tacrolimus 0.5 mg oral capsule: 1 cap(s) orally once a day (at bedtime) (27 Feb 2018 22:01)  tacrolimus 1 mg oral capsule: orally once a day in the morning  (27 Feb 2018 22:00)      MEDICATIONS  (STANDING):  ALBUTerol    90 MICROgram(s) HFA Inhaler 1 Puff(s) Inhalation every 4 hours  ALBUTerol/ipratropium for Nebulization 3 milliLiter(s) Nebulizer every 6 hours  amLODIPine   Tablet 10 milliGRAM(s) Oral every 24 hours  docusate sodium 100 milliGRAM(s) Oral three times a day  methylPREDNISolone sodium succinate Injectable 80 milliGRAM(s) IV Push every 6 hours  metoprolol     tartrate 25 milliGRAM(s) Oral two times a day  montelukast 10 milliGRAM(s) Oral daily  pantoprazole   Suspension 40 milliGRAM(s) Oral daily  senna 2 Tablet(s) Oral at bedtime  tiotropium 18 MICROgram(s) Capsule 1 Capsule(s) Inhalation daily    MEDICATIONS  (PRN):  acetaminophen   Tablet. 650 milliGRAM(s) Oral every 6 hours PRN Moderate Pain (4 - 6)  HYDROmorphone  Injectable 2 milliGRAM(s) IV Push four times a day PRN Severe Pain (7 - 10)  ondansetron Injectable 4 milliGRAM(s) IV Push every 8 hours PRN Nausea and/or Vomiting  oxyCODONE    5 mG/acetaminophen 325 mG 2 Tablet(s) Oral every 4 hours PRN Moderate Pain (4 - 6)  polyethylene glycol 3350 17 Gram(s) Oral daily PRN Constipation      Allergies    No Known Allergies    Intolerances        FAMILY HISTORY:  No pertinent family history in first degree relatives      SOCIAL    REVIEW OF SYSTEMS    Vital Signs Last 24 Hrs  T(C): 36.7 (07 Mar 2018 08:00), Max: 37.2 (07 Mar 2018 00:00)  T(F): 98.1 (07 Mar 2018 08:00), Max: 98.9 (07 Mar 2018 00:00)  HR: 88 (07 Mar 2018 15:00) (74 - 120)  BP: 121/68 (07 Mar 2018 15:00) (101/59 - 163/81)  BP(mean): 84 (07 Mar 2018 14:00) (75 - 105)  RR: 23 (07 Mar 2018 15:00) (13 - 47)  SpO2: 99% (07 Mar 2018 15:00) (77% - 100%)    GENERAL:  no distress  HEENT:  NC/AT,  anicteric  CHEST:   no increased effort, breath sounds clear  HEART:  Regular rhythm  ABDOMEN:  Soft, non-tender, non-distended, normoactive bowel sounds,  no masses ,no hepato-splenomegaly, no signs of chronic liver disease  EXTEREMITIES:  no cyanosis      CBC Full  -  ( 07 Mar 2018 04:18 )  WBC Count : 12.15 K/uL  Hemoglobin : 8.5 g/dL  Hematocrit : 27.6 %  Platelet Count - Automated : 382 K/uL  Mean Cell Volume : 71.3 fL  Mean Cell Hemoglobin : 22.0 pg  Mean Cell Hemoglobin Concentration : 30.8 g/dL  Auto Neutrophil # : x  Auto Lymphocyte # : x  Auto Monocyte # : x  Auto Eosinophil # : x  Auto Basophil # : x  Auto Neutrophil % : x  Auto Lymphocyte % : x  Auto Monocyte % : x  Auto Eosinophil % : x  Auto Basophil % : x      Hemoglobin: 8.5 g/dL (03-07-18 @ 04:18)  INR: 3.85 ratio (03-07-18 @ 04:18)  INR: 4.59 ratio (03-06-18 @ 12:02)  Hemoglobin: 8.0 g/dL (03-06-18 @ 11:46)  Hemoglobin: 8.5 g/dL (03-05-18 @ 19:55)  Aspartate Aminotransferase (AST/SGOT): 25 U/L (03-05-18 @ 19:55)  Bilirubin Total, Serum: 0.7 mg/dL (03-05-18 @ 19:55)  Alanine Aminotransferase (ALT/SGPT): 19 U/L (03-05-18 @ 19:55)  Alkaline Phosphatase, Serum: 93 U/L (03-05-18 @ 19:55)  INR: 4.38 ratio (03-05-18 @ 19:55)      PT/INR - ( 07 Mar 2018 04:18 )   PT: >40.00 sec;   INR: 3.85 ratio             03-07    141  |  104  |  18  ----------------------------<  201<H>  3.3<L>   |  24  |  1.4    Ca    8.0<L>      07 Mar 2018 04:18  Mg     2.0     03-05    TPro  5.4<L>  /  Alb  2.4<L>  /  TBili  0.7  /  DBili  x   /  AST  25  /  ALT  19  /  AlkPhos  93  03-05              RADIOLOGY Patient is a 73y old  Female who presented initially for RLE wound s/p debridement with + ORSA. No abscess was found as per ID so ABx were d/c. Course was complicated by respiratory distress suspected to be caused by Tacrolimus being given for autoimmune hepatitis.      Not able to review previous info because [facility] was down      ROS [facility]      PAST MEDICAL & SURGICAL HISTORY:  Essential hypertension  Autoimmune hepatitis treated with steroids  Asthma  DVT (deep venous thrombosis)  No significant past surgical history      Home Medications:  amLODIPine 10 mg oral tablet: 1 tab(s) orally once a day (27 Feb 2018 13:44)  budesonide 3 mg oral capsule, extended release: 1 cap(s) orally 2 times a day (27 Feb 2018 22:03)  metoprolol tartrate 50 mg oral tablet: 1 tab(s) orally once a day (27 Feb 2018 13:44)  predniSONE 10 mg oral tablet: 1 tab(s) orally once a day (27 Feb 2018 13:44)  Singulair 10 mg oral tablet: 1 tab(s) orally once a day (27 Feb 2018 13:44)  tacrolimus 0.5 mg oral capsule: 1 cap(s) orally once a day (at bedtime) (27 Feb 2018 22:01)  tacrolimus 1 mg oral capsule: orally once a day in the morning  (27 Feb 2018 22:00)      MEDICATIONS  (STANDING):  ALBUTerol    90 MICROgram(s) HFA Inhaler 1 Puff(s) Inhalation every 4 hours  ALBUTerol/ipratropium for Nebulization 3 milliLiter(s) Nebulizer every 6 hours  amLODIPine   Tablet 10 milliGRAM(s) Oral every 24 hours  docusate sodium 100 milliGRAM(s) Oral three times a day  methylPREDNISolone sodium succinate Injectable 80 milliGRAM(s) IV Push every 6 hours  metoprolol     tartrate 25 milliGRAM(s) Oral two times a day  montelukast 10 milliGRAM(s) Oral daily  pantoprazole   Suspension 40 milliGRAM(s) Oral daily  senna 2 Tablet(s) Oral at bedtime  tiotropium 18 MICROgram(s) Capsule 1 Capsule(s) Inhalation daily    MEDICATIONS  (PRN):  acetaminophen   Tablet. 650 milliGRAM(s) Oral every 6 hours PRN Moderate Pain (4 - 6)  HYDROmorphone  Injectable 2 milliGRAM(s) IV Push four times a day PRN Severe Pain (7 - 10)  ondansetron Injectable 4 milliGRAM(s) IV Push every 8 hours PRN Nausea and/or Vomiting  oxyCODONE    5 mG/acetaminophen 325 mG 2 Tablet(s) Oral every 4 hours PRN Moderate Pain (4 - 6)  polyethylene glycol 3350 17 Gram(s) Oral daily PRN Constipation      Allergies    No Known Allergies    Intolerances        FAMILY HISTORY:  No pertinent family history in first degree relatives      SOCIAL    REVIEW OF SYSTEMS    Vital Signs Last 24 Hrs  T(C): 36.7 (07 Mar 2018 08:00), Max: 37.2 (07 Mar 2018 00:00)  T(F): 98.1 (07 Mar 2018 08:00), Max: 98.9 (07 Mar 2018 00:00)  HR: 88 (07 Mar 2018 15:00) (74 - 120)  BP: 121/68 (07 Mar 2018 15:00) (101/59 - 163/81)  BP(mean): 84 (07 Mar 2018 14:00) (75 - 105)  RR: 23 (07 Mar 2018 15:00) (13 - 47)  SpO2: 99% (07 Mar 2018 15:00) (77% - 100%)    GENERAL:  no distress  HEENT:  NC/AT,  anicteric  CHEST:   no increased effort, breath sounds clear  HEART:  Regular rhythm  ABDOMEN:  Soft, non-tender, non-distended, normoactive bowel sounds,  no masses ,no hepato-splenomegaly, no signs of chronic liver disease  EXTEREMITIES:  no cyanosis      CBC Full  -  ( 07 Mar 2018 04:18 )  WBC Count : 12.15 K/uL  Hemoglobin : 8.5 g/dL  Hematocrit : 27.6 %  Platelet Count - Automated : 382 K/uL  Mean Cell Volume : 71.3 fL  Mean Cell Hemoglobin : 22.0 pg  Mean Cell Hemoglobin Concentration : 30.8 g/dL  Auto Neutrophil # : x  Auto Lymphocyte # : x  Auto Monocyte # : x  Auto Eosinophil # : x  Auto Basophil # : x  Auto Neutrophil % : x  Auto Lymphocyte % : x  Auto Monocyte % : x  Auto Eosinophil % : x  Auto Basophil % : x      Hemoglobin: 8.5 g/dL (03-07-18 @ 04:18)  INR: 3.85 ratio (03-07-18 @ 04:18)  INR: 4.59 ratio (03-06-18 @ 12:02)  Hemoglobin: 8.0 g/dL (03-06-18 @ 11:46)  Hemoglobin: 8.5 g/dL (03-05-18 @ 19:55)  Aspartate Aminotransferase (AST/SGOT): 25 U/L (03-05-18 @ 19:55)  Bilirubin Total, Serum: 0.7 mg/dL (03-05-18 @ 19:55)  Alanine Aminotransferase (ALT/SGPT): 19 U/L (03-05-18 @ 19:55)  Alkaline Phosphatase, Serum: 93 U/L (03-05-18 @ 19:55)  INR: 4.38 ratio (03-05-18 @ 19:55)      PT/INR - ( 07 Mar 2018 04:18 )   PT: >40.00 sec;   INR: 3.85 ratio             03-07    141  |  104  |  18  ----------------------------<  201<H>  3.3<L>   |  24  |  1.4    Ca    8.0<L>      07 Mar 2018 04:18  Mg     2.0     03-05    TPro  5.4<L>  /  Alb  2.4<L>  /  TBili  0.7  /  DBili  x   /  AST  25  /  ALT  19  /  AlkPhos  93  03-05              RADIOLOGY Patient is a 73y old  Female who presented initially for RLE wound s/p debridement with + ORSA. No abscess was found as per ID so ABx were d/c. Course was complicated by respiratory distress suspected to be caused by Tacrolimus being given for autoimmune hepatitis.      Patient follows at [place] for her autoimmune hepatitis. She notes that she was diagnosed with AI in 2015 based on liver biopsy done at [facility], did not tolerate azathioprine, cyclosporine among other meds but since 2016 was maintained on Prednisone, Budesonide 3 mg q12h, Tacrolimus 1 mg am and 1 mg pm with fine adjustment of prednisone dose according to clinical status and blood work. She gets Prograf levels, LFTs ... checked q 2 weeks. She was previously assessed for liver transplant referral but was told that she is not a candidate because of her age.     GI Dr Galvez at [place]   Colonoscopy long time ago   EGD 6 months ago   FH non sign   usually has 2-3 normal bm per day, good appetite     ROS wnl      PAST MEDICAL & SURGICAL HISTORY:  Essential hypertension  Autoimmune hepatitis treated with steroids  Asthma  DVT (deep venous thrombosis)  No significant past surgical history      Home Medications:  amLODIPine 10 mg oral tablet: 1 tab(s) orally once a day (27 Feb 2018 13:44)  budesonide 3 mg oral capsule, extended release: 1 cap(s) orally 2 times a day (27 Feb 2018 22:03)  metoprolol tartrate 50 mg oral tablet: 1 tab(s) orally once a day (27 Feb 2018 13:44)  predniSONE 10 mg oral tablet: 1 tab(s) orally once a day (27 Feb 2018 13:44)  Singulair 10 mg oral tablet: 1 tab(s) orally once a day (27 Feb 2018 13:44)  tacrolimus 0.5 mg oral capsule: 1 cap(s) orally once a day (at bedtime) (27 Feb 2018 22:01)  tacrolimus 1 mg oral capsule: orally once a day in the morning  (27 Feb 2018 22:00)      MEDICATIONS  (STANDING):  ALBUTerol    90 MICROgram(s) HFA Inhaler 1 Puff(s) Inhalation every 4 hours  ALBUTerol/ipratropium for Nebulization 3 milliLiter(s) Nebulizer every 6 hours  amLODIPine   Tablet 10 milliGRAM(s) Oral every 24 hours  docusate sodium 100 milliGRAM(s) Oral three times a day  methylPREDNISolone sodium succinate Injectable 80 milliGRAM(s) IV Push every 6 hours  metoprolol     tartrate 25 milliGRAM(s) Oral two times a day  montelukast 10 milliGRAM(s) Oral daily  pantoprazole   Suspension 40 milliGRAM(s) Oral daily  senna 2 Tablet(s) Oral at bedtime  tiotropium 18 MICROgram(s) Capsule 1 Capsule(s) Inhalation daily    MEDICATIONS  (PRN):  acetaminophen   Tablet. 650 milliGRAM(s) Oral every 6 hours PRN Moderate Pain (4 - 6)  HYDROmorphone  Injectable 2 milliGRAM(s) IV Push four times a day PRN Severe Pain (7 - 10)  ondansetron Injectable 4 milliGRAM(s) IV Push every 8 hours PRN Nausea and/or Vomiting  oxyCODONE    5 mG/acetaminophen 325 mG 2 Tablet(s) Oral every 4 hours PRN Moderate Pain (4 - 6)  polyethylene glycol 3350 17 Gram(s) Oral daily PRN Constipation      Allergies    No Known Allergies    Intolerances        FAMILY HISTORY:  No pertinent family history in first degree relatives      SOCIAL    REVIEW OF SYSTEMS    Vital Signs Last 24 Hrs  T(C): 36.7 (07 Mar 2018 08:00), Max: 37.2 (07 Mar 2018 00:00)  T(F): 98.1 (07 Mar 2018 08:00), Max: 98.9 (07 Mar 2018 00:00)  HR: 88 (07 Mar 2018 15:00) (74 - 120)  BP: 121/68 (07 Mar 2018 15:00) (101/59 - 163/81)  BP(mean): 84 (07 Mar 2018 14:00) (75 - 105)  RR: 23 (07 Mar 2018 15:00) (13 - 47)  SpO2: 99% (07 Mar 2018 15:00) (77% - 100%)    GENERAL:  no distress  HEENT:  NC/AT,  anicteric  CHEST:   no increased effort, breath sounds clear  HEART:  Regular rhythm  ABDOMEN:  Soft, non-tender, non-distended, normoactive bowel sounds,  no masses ,no hepato-splenomegaly, no signs of chronic liver disease  EXTEREMITIES:  no cyanosis      CBC Full  -  ( 07 Mar 2018 04:18 )  WBC Count : 12.15 K/uL  Hemoglobin : 8.5 g/dL  Hematocrit : 27.6 %  Platelet Count - Automated : 382 K/uL  Mean Cell Volume : 71.3 fL  Mean Cell Hemoglobin : 22.0 pg  Mean Cell Hemoglobin Concentration : 30.8 g/dL  Auto Neutrophil # : x  Auto Lymphocyte # : x  Auto Monocyte # : x  Auto Eosinophil # : x  Auto Basophil # : x  Auto Neutrophil % : x  Auto Lymphocyte % : x  Auto Monocyte % : x  Auto Eosinophil % : x  Auto Basophil % : x      Hemoglobin: 8.5 g/dL (03-07-18 @ 04:18)  INR: 3.85 ratio (03-07-18 @ 04:18)  INR: 4.59 ratio (03-06-18 @ 12:02)  Hemoglobin: 8.0 g/dL (03-06-18 @ 11:46)  Hemoglobin: 8.5 g/dL (03-05-18 @ 19:55)  Aspartate Aminotransferase (AST/SGOT): 25 U/L (03-05-18 @ 19:55)  Bilirubin Total, Serum: 0.7 mg/dL (03-05-18 @ 19:55)  Alanine Aminotransferase (ALT/SGPT): 19 U/L (03-05-18 @ 19:55)  Alkaline Phosphatase, Serum: 93 U/L (03-05-18 @ 19:55)  INR: 4.38 ratio (03-05-18 @ 19:55)      PT/INR - ( 07 Mar 2018 04:18 )   PT: >40.00 sec;   INR: 3.85 ratio             03-07    141  |  104  |  18  ----------------------------<  201<H>  3.3<L>   |  24  |  1.4    Ca    8.0<L>      07 Mar 2018 04:18  Mg     2.0     03-05    TPro  5.4<L>  /  Alb  2.4<L>  /  TBili  0.7  /  DBili  x   /  AST  25  /  ALT  19  /  AlkPhos  93  03-05              RADIOLOGY Patient is a 73y old  Female with pmh as below who presented initially for RLE wound s/p debridement with + ORSA. No abscess was found as per ID so ABx were d/c. Course was complicated by respiratory distress suspected to be caused by Tacrolimus being given for autoimmune hepatitis.      Patient follows at [place] for her autoimmune hepatitis. She notes that she was diagnosed with AI in 2015 based on liver biopsy done at [facility]. Liver bx showing severe autoimmune hepatitis grade 4/4, stage 2/4, AIH vs DILI-AIH. She did not tolerate azathioprine, cyclosporine among other meds but since 2016 was maintained on Prednisone, Budesonide 3 mg q12h, Tacrolimus 1 mg am and 1 mg pm with fine adjustment of prednisone dose according to clinical status and blood work. She gets Prograf levels, LFTs ... checked q 2 weeks. She was previously assessed for liver transplant referral but was told that she is not a candidate because of her age.     Never had a paracentesis, never had variceal bleeding, never was encephalopathic     GI Dr Galvez at [place]   Colonoscopy long time ago   EGD 6 months ago   FH non sign   usually has 2-3 normal bm per day, good appetite     ROS wnl      PAST MEDICAL & SURGICAL HISTORY:  Essential hypertension  Autoimmune hepatitis treated with steroids  Asthma  DVT (deep venous thrombosis)  rectus mx hematoma     No significant past surgical history      Home Medications:  amLODIPine 10 mg oral tablet: 1 tab(s) orally once a day (27 Feb 2018 13:44)  budesonide 3 mg oral capsule, extended release: 1 cap(s) orally 2 times a day (27 Feb 2018 22:03)  metoprolol tartrate 50 mg oral tablet: 1 tab(s) orally once a day (27 Feb 2018 13:44)  predniSONE 10 mg oral tablet: 1 tab(s) orally once a day (27 Feb 2018 13:44)  Singulair 10 mg oral tablet: 1 tab(s) orally once a day (27 Feb 2018 13:44)  tacrolimus 0.5 mg oral capsule: 1 cap(s) orally once a day (at bedtime) (27 Feb 2018 22:01)  tacrolimus 1 mg oral capsule: orally once a day in the morning  (27 Feb 2018 22:00)      MEDICATIONS  (STANDING):  ALBUTerol    90 MICROgram(s) HFA Inhaler 1 Puff(s) Inhalation every 4 hours  ALBUTerol/ipratropium for Nebulization 3 milliLiter(s) Nebulizer every 6 hours  amLODIPine   Tablet 10 milliGRAM(s) Oral every 24 hours  docusate sodium 100 milliGRAM(s) Oral three times a day  methylPREDNISolone sodium succinate Injectable 80 milliGRAM(s) IV Push every 6 hours  metoprolol     tartrate 25 milliGRAM(s) Oral two times a day  montelukast 10 milliGRAM(s) Oral daily  pantoprazole   Suspension 40 milliGRAM(s) Oral daily  senna 2 Tablet(s) Oral at bedtime  tiotropium 18 MICROgram(s) Capsule 1 Capsule(s) Inhalation daily    MEDICATIONS  (PRN):  acetaminophen   Tablet. 650 milliGRAM(s) Oral every 6 hours PRN Moderate Pain (4 - 6)  HYDROmorphone  Injectable 2 milliGRAM(s) IV Push four times a day PRN Severe Pain (7 - 10)  ondansetron Injectable 4 milliGRAM(s) IV Push every 8 hours PRN Nausea and/or Vomiting  oxyCODONE    5 mG/acetaminophen 325 mG 2 Tablet(s) Oral every 4 hours PRN Moderate Pain (4 - 6)  polyethylene glycol 3350 17 Gram(s) Oral daily PRN Constipation      Allergies    No Known Allergies    Intolerances        FAMILY HISTORY:  No pertinent family history in first degree relatives      SOCIAL    REVIEW OF SYSTEMS    Vital Signs Last 24 Hrs  T(C): 36.7 (07 Mar 2018 08:00), Max: 37.2 (07 Mar 2018 00:00)  T(F): 98.1 (07 Mar 2018 08:00), Max: 98.9 (07 Mar 2018 00:00)  HR: 88 (07 Mar 2018 15:00) (74 - 120)  BP: 121/68 (07 Mar 2018 15:00) (101/59 - 163/81)  BP(mean): 84 (07 Mar 2018 14:00) (75 - 105)  RR: 23 (07 Mar 2018 15:00) (13 - 47)  SpO2: 99% (07 Mar 2018 15:00) (77% - 100%)    GENERAL:  no distress  HEENT:  NC/AT,  anicteric  CHEST:   no increased effort, breath sounds clear  HEART:  Regular rhythm  ABDOMEN:  Soft, non-tender, non-distended, normoactive bowel sounds,  no masses ,no hepato-splenomegaly, no signs of chronic liver disease  EXTEREMITIES:  no cyanosis      CBC Full  -  ( 07 Mar 2018 04:18 )  WBC Count : 12.15 K/uL  Hemoglobin : 8.5 g/dL  Hematocrit : 27.6 %  Platelet Count - Automated : 382 K/uL  Mean Cell Volume : 71.3 fL  Mean Cell Hemoglobin : 22.0 pg  Mean Cell Hemoglobin Concentration : 30.8 g/dL  Auto Neutrophil # : x  Auto Lymphocyte # : x  Auto Monocyte # : x  Auto Eosinophil # : x  Auto Basophil # : x  Auto Neutrophil % : x  Auto Lymphocyte % : x  Auto Monocyte % : x  Auto Eosinophil % : x  Auto Basophil % : x      Hemoglobin: 8.5 g/dL (03-07-18 @ 04:18)  INR: 3.85 ratio (03-07-18 @ 04:18)  INR: 4.59 ratio (03-06-18 @ 12:02)  Hemoglobin: 8.0 g/dL (03-06-18 @ 11:46)  Hemoglobin: 8.5 g/dL (03-05-18 @ 19:55)  Aspartate Aminotransferase (AST/SGOT): 25 U/L (03-05-18 @ 19:55)  Bilirubin Total, Serum: 0.7 mg/dL (03-05-18 @ 19:55)  Alanine Aminotransferase (ALT/SGPT): 19 U/L (03-05-18 @ 19:55)  Alkaline Phosphatase, Serum: 93 U/L (03-05-18 @ 19:55)  INR: 4.38 ratio (03-05-18 @ 19:55)      PT/INR - ( 07 Mar 2018 04:18 )   PT: >40.00 sec;   INR: 3.85 ratio             03-07    141  |  104  |  18  ----------------------------<  201<H>  3.3<L>   |  24  |  1.4    Ca    8.0<L>      07 Mar 2018 04:18  Mg     2.0     03-05    TPro  5.4<L>  /  Alb  2.4<L>  /  TBili  0.7  /  DBili  x   /  AST  25  /  ALT  19  /  AlkPhos  93  03-05              RADIOLOGY

## 2018-03-08 LAB
ALBUMIN SERPL ELPH-MCNC: 2.4 G/DL — LOW (ref 3–5.5)
ALP SERPL-CCNC: 87 U/L — SIGNIFICANT CHANGE UP (ref 30–115)
ALT FLD-CCNC: 22 U/L — SIGNIFICANT CHANGE UP (ref 0–41)
ANION GAP SERPL CALC-SCNC: 11 MMOL/L — SIGNIFICANT CHANGE UP (ref 7–14)
AST SERPL-CCNC: 16 U/L — SIGNIFICANT CHANGE UP (ref 0–41)
BASOPHILS # BLD AUTO: 0.01 K/UL — SIGNIFICANT CHANGE UP (ref 0–0.2)
BASOPHILS NFR BLD AUTO: 0.1 % — SIGNIFICANT CHANGE UP (ref 0–1)
BILIRUB DIRECT SERPL-MCNC: 0.2 MG/DL — SIGNIFICANT CHANGE UP (ref 0–0.2)
BILIRUB INDIRECT FLD-MCNC: 0.8 MG/DL — SIGNIFICANT CHANGE UP
BILIRUB SERPL-MCNC: 1 MG/DL — SIGNIFICANT CHANGE UP (ref 0.2–1.2)
BUN SERPL-MCNC: 31 MG/DL — HIGH (ref 10–20)
C3 SERPL-MCNC: 125 MG/DL — SIGNIFICANT CHANGE UP (ref 80–180)
C4 SERPL-MCNC: 8 MG/DL — LOW (ref 10–45)
CALCIUM SERPL-MCNC: 7.9 MG/DL — LOW (ref 8.5–10.1)
CHLORIDE SERPL-SCNC: 101 MMOL/L — SIGNIFICANT CHANGE UP (ref 98–110)
CO2 SERPL-SCNC: 24 MMOL/L — SIGNIFICANT CHANGE UP (ref 17–32)
CREAT SERPL-MCNC: 1.4 MG/DL — SIGNIFICANT CHANGE UP (ref 0.7–1.5)
DSDNA AB SER-ACNC: 46 IU/ML — HIGH
EOSINOPHIL # BLD AUTO: 0 K/UL — SIGNIFICANT CHANGE UP (ref 0–0.7)
EOSINOPHIL NFR BLD AUTO: 0 % — SIGNIFICANT CHANGE UP (ref 0–8)
GLUCOSE SERPL-MCNC: 226 MG/DL — HIGH (ref 70–110)
HCT VFR BLD CALC: 28 % — LOW (ref 37–47)
HGB BLD-MCNC: 8.7 G/DL — LOW (ref 12–16)
IMM GRANULOCYTES NFR BLD AUTO: 1.7 % — HIGH (ref 0.1–0.3)
INR BLD: 4 RATIO — HIGH (ref 0.65–1.3)
LYMPHOCYTES # BLD AUTO: 0.66 K/UL — LOW (ref 1.2–3.4)
LYMPHOCYTES # BLD AUTO: 4.1 % — LOW (ref 20.5–51.1)
MAGNESIUM SERPL-MCNC: 2.2 MG/DL — SIGNIFICANT CHANGE UP (ref 1.8–2.4)
MCHC RBC-ENTMCNC: 22.5 PG — LOW (ref 27–31)
MCHC RBC-ENTMCNC: 31.1 G/DL — LOW (ref 32–37)
MCV RBC AUTO: 72.4 FL — LOW (ref 81–99)
MONOCYTES # BLD AUTO: 0.51 K/UL — SIGNIFICANT CHANGE UP (ref 0.1–0.6)
MONOCYTES NFR BLD AUTO: 3.2 % — SIGNIFICANT CHANGE UP (ref 1.7–9.3)
NEUTROPHILS # BLD AUTO: 14.64 K/UL — HIGH (ref 1.4–6.5)
NEUTROPHILS NFR BLD AUTO: 90.9 % — HIGH (ref 42.2–75.2)
PLATELET # BLD AUTO: 408 K/UL — HIGH (ref 130–400)
POTASSIUM SERPL-MCNC: 3.5 MMOL/L — SIGNIFICANT CHANGE UP (ref 3.5–5)
POTASSIUM SERPL-SCNC: 3.5 MMOL/L — SIGNIFICANT CHANGE UP (ref 3.5–5)
PROT SERPL-MCNC: 5.5 G/DL — LOW (ref 6–8)
PROTHROM AB SERPL-ACNC: >40 SEC — HIGH (ref 9.95–12.87)
RBC # BLD: 3.87 M/UL — LOW (ref 4.2–5.4)
RBC # FLD: 17.5 % — HIGH (ref 11.5–14.5)
RHEUMATOID FACT SERPL-ACNC: 45 IU/ML — HIGH (ref 0–13)
SODIUM SERPL-SCNC: 136 MMOL/L — SIGNIFICANT CHANGE UP (ref 135–146)
TACROLIMUS SERPL-MCNC: 5.4 NG/ML — SIGNIFICANT CHANGE UP
WBC # BLD: 16.09 K/UL — HIGH (ref 4.8–10.8)
WBC # FLD AUTO: 16.09 K/UL — HIGH (ref 4.8–10.8)

## 2018-03-08 RX ORDER — FUROSEMIDE 40 MG
40 TABLET ORAL
Qty: 0 | Refills: 0 | Status: DISCONTINUED | OUTPATIENT
Start: 2018-03-08 | End: 2018-03-09

## 2018-03-08 RX ORDER — MORPHINE SULFATE 50 MG/1
4 CAPSULE, EXTENDED RELEASE ORAL
Qty: 0 | Refills: 0 | Status: DISCONTINUED | OUTPATIENT
Start: 2018-03-08 | End: 2018-03-10

## 2018-03-08 RX ORDER — CHLORHEXIDINE GLUCONATE 213 G/1000ML
1 SOLUTION TOPICAL DAILY
Qty: 0 | Refills: 0 | Status: DISCONTINUED | OUTPATIENT
Start: 2018-03-08 | End: 2018-03-14

## 2018-03-08 RX ADMIN — Medication 25 MILLIGRAM(S): at 05:54

## 2018-03-08 RX ADMIN — AMLODIPINE BESYLATE 10 MILLIGRAM(S): 2.5 TABLET ORAL at 05:55

## 2018-03-08 RX ADMIN — Medication 80 MILLIGRAM(S): at 13:28

## 2018-03-08 RX ADMIN — MORPHINE SULFATE 4 MILLIGRAM(S): 50 CAPSULE, EXTENDED RELEASE ORAL at 17:26

## 2018-03-08 RX ADMIN — Medication 40 MILLIGRAM(S): at 17:25

## 2018-03-08 RX ADMIN — Medication 3 MILLILITER(S): at 14:43

## 2018-03-08 RX ADMIN — Medication 80 MILLIGRAM(S): at 05:54

## 2018-03-08 RX ADMIN — Medication 3 MILLILITER(S): at 07:53

## 2018-03-08 RX ADMIN — Medication 260 MILLIGRAM(S): at 13:27

## 2018-03-08 RX ADMIN — Medication 260 MILLIGRAM(S): at 18:12

## 2018-03-08 RX ADMIN — Medication 650 MILLIGRAM(S): at 06:03

## 2018-03-08 RX ADMIN — Medication 80 MILLIGRAM(S): at 00:33

## 2018-03-08 RX ADMIN — PANTOPRAZOLE SODIUM 40 MILLIGRAM(S): 20 TABLET, DELAYED RELEASE ORAL at 13:27

## 2018-03-08 RX ADMIN — Medication 3 MILLILITER(S): at 20:21

## 2018-03-08 RX ADMIN — Medication 650 MILLIGRAM(S): at 07:30

## 2018-03-08 RX ADMIN — MONTELUKAST 10 MILLIGRAM(S): 4 TABLET, CHEWABLE ORAL at 13:27

## 2018-03-08 RX ADMIN — Medication 80 MILLIGRAM(S): at 17:26

## 2018-03-08 RX ADMIN — MORPHINE SULFATE 4 MILLIGRAM(S): 50 CAPSULE, EXTENDED RELEASE ORAL at 15:18

## 2018-03-08 NOTE — PROGRESS NOTE ADULT - SUBJECTIVE AND OBJECTIVE BOX
DONI PATRICK  73y  Female      Patient is a 73y old  Female who presents with a chief complaint of right leg wound, needs debridement (04 Mar 2018 15:38)      INTERVAL HPI/OVERNIGHT EVENTS:                  REVIEW OF SYSTEMS:  CONSTITUTIONAL: No fever  ENMT:   No throat pain  NECK: No pain or stiffness  RESPIRATORY: + cough, shortness of breath  CARDIOVASCULAR: No chest pain  GASTROINTESTINAL: No abdominal, nausea, vomiting  GENITOURINARY: No dysuria  NEUROLOGICAL: No headaches  SKIN: +, bruisinglesions       T(C): 36 (03-08-18 @ 04:00), Max: 36.2 (03-07-18 @ 20:00)  HR: 80 (03-08-18 @ 07:00) (78 - 120)  BP: 113/64 (03-08-18 @ 07:00) (101/59 - 145/74)  RR: 32 (03-08-18 @ 07:00) (21 - 54)  SpO2: 93% (03-08-18 @ 07:00) (77% - 100%)  Wt(kg): --Vital Signs Last 24 Hrs  T(C): 36 (08 Mar 2018 04:00), Max: 36.2 (07 Mar 2018 20:00)  T(F): 96.8 (08 Mar 2018 04:00), Max: 97.2 (07 Mar 2018 20:00)  HR: 80 (08 Mar 2018 07:00) (78 - 120)  BP: 113/64 (08 Mar 2018 07:00) (101/59 - 145/74)  BP(mean): 84 (08 Mar 2018 07:00) (75 - 101)  RR: 32 (08 Mar 2018 07:00) (21 - 54)  SpO2: 93% (08 Mar 2018 07:00) (77% - 100%)    PHYSICAL EXAM:  GENERAL: NAD, OOB in chair with hi-flow nasal O2  HEAD:  Atraumatic, Normocephalic  EYES: EOMI, PERRLA, conjunctiva and sclera clear  ENMT: Moist mucous membrane  NECK: Supple, No JVD  NERVOUS SYSTEM:  Alert & Oriented X3  CHEST/LUNG: Decreased at bases but Clear, no wheeze   HEART: Regular rate and rhythm  ABDOMEN: Soft, Nontender, Nondistended; Bowel sounds present  EXTREMITIES:  trace  edema  SKIN: dressing in place to RLE    Consultant(s) Notes Reviewed:  [x ] YES  [ ] NO  Care Discussed with Consultants/Other Providers [ x] YES  [ ] NO    LABS:  Labs:                        8.7    16.09 )-----------( 408      ( 08 Mar 2018 04:51 )             28.0             03-08    136  |  101  |  31<H>  ----------------------------<  226<H>  3.5   |  24  |  1.4    Ca    7.9<L>      08 Mar 2018 04:51  Mg     2.2     03-08    TPro  5.5<L>  /  Alb  2.4<L>  /  TBili  1.0  /  DBili  0.2  /  AST  16  /  ALT  22  /  AlkPhos  87  03-08    LIVER FUNCTIONS - ( 08 Mar 2018 04:51 )  Alb: 2.4 g/dL / Pro: 5.5 g/dL / ALK PHOS: 87 U/L / ALT: 22 U/L / AST: 16 U/L / GGT: x                 PT/INR - ( 08 Mar 2018 04:51 )   PT: >40.00 sec;   INR: 4.00 ratio           ABG - ( 07 Mar 2018 04:37 )  pH: 7.55  /  pCO2: 32    /  pO2: 55    / HCO3: 28    / Base Excess: 5.5   /  SaO2: 91            RADIOLOGY & ADDITIONAL TESTS:  < from: Xray Chest 1 View-PORTABLE IMMEDIATE (03.07.18 @ 05:40) >  IMPRESSION:     Unchanged bilateral opacities and bilateral pleural effusions.      < end of copied text >    Imaging Personally Reviewed:  [x ] YES  [ ] NO    HEALTH ISSUES - PROBLEM Dx: DONI PATRICK  73y  Female      Patient is a 73y old  Female who presents with a chief complaint of right leg wound, needs debridement (04 Mar 2018 15:38)      INTERVAL HPI/OVERNIGHT EVENTS: Patient OOB in chair, feels better. Patient remains in the ICU on hi-flow nasal O2.Patient did not tolerate lowering of O2. Patient eating.       REVIEW OF SYSTEMS:  CONSTITUTIONAL: No fever  ENMT:   No throat pain  NECK: No pain or stiffness  RESPIRATORY: + cough, shortness of breath  CARDIOVASCULAR: No chest pain  GASTROINTESTINAL: No abdominal, nausea, vomiting  GENITOURINARY: No dysuria  NEUROLOGICAL: No headaches  SKIN: +, bruising lesions       T(C): 36 (03-08-18 @ 04:00), Max: 36.2 (03-07-18 @ 20:00)  HR: 80 (03-08-18 @ 07:00) (78 - 120)  BP: 113/64 (03-08-18 @ 07:00) (101/59 - 145/74)  RR: 32 (03-08-18 @ 07:00) (21 - 54)  SpO2: 93% (03-08-18 @ 07:00) (77% - 100%)  Wt(kg): --Vital Signs Last 24 Hrs  T(C): 36 (08 Mar 2018 04:00), Max: 36.2 (07 Mar 2018 20:00)  T(F): 96.8 (08 Mar 2018 04:00), Max: 97.2 (07 Mar 2018 20:00)  HR: 80 (08 Mar 2018 07:00) (78 - 120)  BP: 113/64 (08 Mar 2018 07:00) (101/59 - 145/74)  BP(mean): 84 (08 Mar 2018 07:00) (75 - 101)  RR: 32 (08 Mar 2018 07:00) (21 - 54)  SpO2: 93% (08 Mar 2018 07:00) (77% - 100%)    PHYSICAL EXAM:  GENERAL: NAD, OOB in chair with hi-flow nasal O2  HEAD:  Atraumatic, Normocephalic  EYES: EOMI, PERRLA, conjunctiva and sclera clear  ENMT: Moist mucous membrane  NECK: Supple, No JVD  NERVOUS SYSTEM:  Alert & Oriented X3  CHEST/LUNG: Decreased at bases but Clear, no wheeze   HEART: Regular rate and rhythm  ABDOMEN: Soft, Nontender, Nondistended; Bowel sounds present  EXTREMITIES:  trace  edema  SKIN: dressing in place to RLE    Consultant(s) Notes Reviewed:  [x ] YES  [ ] NO  Care Discussed with Consultants/Other Providers [ x] YES  [ ] NO    LABS:  Labs:                        8.7    16.09 )-----------( 408      ( 08 Mar 2018 04:51 )             28.0             03-08    136  |  101  |  31<H>  ----------------------------<  226<H>  3.5   |  24  |  1.4    Ca    7.9<L>      08 Mar 2018 04:51  Mg     2.2     03-08    TPro  5.5<L>  /  Alb  2.4<L>  /  TBili  1.0  /  DBili  0.2  /  AST  16  /  ALT  22  /  AlkPhos  87  03-08    LIVER FUNCTIONS - ( 08 Mar 2018 04:51 )  Alb: 2.4 g/dL / Pro: 5.5 g/dL / ALK PHOS: 87 U/L / ALT: 22 U/L / AST: 16 U/L / GGT: x                 PT/INR - ( 08 Mar 2018 04:51 )   PT: >40.00 sec;   INR: 4.00 ratio         ABG - ( 07 Mar 2018 04:37 )  pH: 7.55  /  pCO2: 32    /  pO2: 55    / HCO3: 28    / Base Excess: 5.5   /  SaO2: 91         RADIOLOGY & ADDITIONAL TESTS:  < from: Xray Chest 1 View-PORTABLE IMMEDIATE (03.07.18 @ 05:40) >  IMPRESSION:     Unchanged bilateral opacities and bilateral pleural effusions.      < end of copied text >  < from: Transthoracic Echocardiogram (03.06.18 @ 14:42) >    Summary:   1. LV Ejection Fraction by Caballero's Method with a biplane EF of 69 %.   2. Normal left ventricular size and wall thicknesses, with normal   systolic and diastolic function.   3. The mean global longitudinal strain by speckle tracking is -19.8%   which is a normal value.   4. Estimated pulmonary artery systolic pressure is 43.4 mmHg assuming a   right atrial pressure of 3 mmHg, which is consistent with mild pulmonary   hypertension.   5. Pulmonary hypertension is present.   6. LA volume Index is 20.3 ml/m² ml/m2.    < end of copied text >    Imaging Personally Reviewed:  [x ] YES  [ ] NO    HEALTH ISSUES - PROBLEM Dx:

## 2018-03-08 NOTE — PROGRESS NOTE ADULT - ASSESSMENT
IMPRESSION:    AHRF Inflammatory vs infectious infiltrate?  HO PC pneumonia   HO autoimmune hepatitis     PLAN:    CNS: no depressants    HEENT: Oral care    PULMONARY:  HOB @ 45 degrees.  Wean O2 not flow.  Possible bronchoscopy in AM     CARDIOVASCULAR:  I = O Start Lasix 40 mg BID (will start BActrim)    GI: GI prophylaxis.  Feeding.  NPO post midnight      RENAL:  Follow up lytes.  Correct as needed    INFECTIOUS DISEASE: Follow up cultures.  Start Bactrim     HEMATOLOGICAL:  DVT prophylaxis.    ENDOCRINE:  Follow up FS.  Insulin protocol if needed.    MUSCULOSKELETAL:    Continue Solumedrol.  Complement levels.  Cryoglobulin levels.  ESR.  Low threshold for intubation.  Keep ICU monitoring   DC Hogan   Prograf level.  Hold Prograf and FU with GI   DW patient risks of Bronchoscopy including MV.  She agrees

## 2018-03-08 NOTE — PROGRESS NOTE ADULT - SUBJECTIVE AND OBJECTIVE BOX
PGY I NOTE    LENGTH OF HOSPITAL STAY:  9d    CHIEF COMPLAINT:Patient is a 73y old  Female who presents with a chief complaint of right leg wound, needs debridement (04 Mar 2018 15:38)    HPI:HPI:  Pt states she fell Monday and went for evaluation to [facility] and laceration was sutured and pt was discharged. Pt came back today for increasing pain in right LE. Patient denies calf pain, fevers, chest pain, SOB, abdominal pain, nausea, vomiting, diarrhea, dizziness, weakness. (27 Feb 2018 13:32)    OVERNIGHT EVENTS/INTERVAL UPDATES:    PMH & PSH  PAST MEDICAL & SURGICAL HISTORY:  Essential hypertension  Autoimmune hepatitis treated with steroids  Asthma  DVT (deep venous thrombosis)  No significant past surgical history    SOCIAL HISTORY: Negative    ALLERGIES: No Known Allergies    HOME MEDICATIONS  Home Medications:  amLODIPine 10 mg oral tablet: 1 tab(s) orally once a day (27 Feb 2018 13:44)  budesonide 3 mg oral capsule, extended release: 1 cap(s) orally 2 times a day (27 Feb 2018 22:03)  metoprolol tartrate 50 mg oral tablet: 1 tab(s) orally once a day (27 Feb 2018 13:44)  predniSONE 10 mg oral tablet: 1 tab(s) orally once a day (27 Feb 2018 13:44)  Singulair 10 mg oral tablet: 1 tab(s) orally once a day (27 Feb 2018 13:44)  tacrolimus 0.5 mg oral capsule: 1 cap(s) orally once a day (at bedtime) (27 Feb 2018 22:01)  tacrolimus 1 mg oral capsule: orally once a day in the morning  (27 Feb 2018 22:00)    PHYSICAL EXAM:  T(F): 96.8 (03-08-18 @ 08:00), Max: 97.2 (03-07-18 @ 20:00)  HR: 92 (03-08-18 @ 11:00)  BP: 85/62 (03-08-18 @ 11:00)  RR: 34 (03-08-18 @ 11:00)  SpO2: 96% (03-08-18 @ 11:00)  CAPILLARY BLOOD GLUCOSE          03-07-18 @ 07:01  -  03-08-18 @ 07:00  --------------------------------------------------------  IN:    Oral Fluid: 850 mL  Total IN: 850 mL    OUT:    Indwelling Catheter - Urethral: 200 mL    Voided: 450 mL  Total OUT: 650 mL    Total NET: 200 mL            General: NAD  HEENT: NCAT  CV: RRR  RESP: CTAB  Abdominal: Soft, NTTP  Extremity: no c/c/e  Neuro: A&O x3    MEDICATIONS  STANDING MEDICATIONS  ALBUTerol    90 MICROgram(s) HFA Inhaler 1 Puff(s) Inhalation every 4 hours  ALBUTerol/ipratropium for Nebulization 3 milliLiter(s) Nebulizer every 6 hours  amLODIPine   Tablet 10 milliGRAM(s) Oral every 24 hours  docusate sodium 100 milliGRAM(s) Oral three times a day  furosemide   Injectable 40 milliGRAM(s) IV Push two times a day  methylPREDNISolone sodium succinate Injectable 80 milliGRAM(s) IV Push every 6 hours  metoprolol     tartrate 25 milliGRAM(s) Oral two times a day  montelukast 10 milliGRAM(s) Oral daily  pantoprazole   Suspension 40 milliGRAM(s) Oral daily  senna 2 Tablet(s) Oral at bedtime  tiotropium 18 MICROgram(s) Capsule 1 Capsule(s) Inhalation daily  trimethoprim / sulfamethoxazole IVPB 320 milliGRAM(s) IV Intermittent every 6 hours    PRN MEDICATIONS  acetaminophen   Tablet. 650 milliGRAM(s) Oral every 6 hours PRN  HYDROmorphone  Injectable 2 milliGRAM(s) IV Push four times a day PRN  ondansetron Injectable 4 milliGRAM(s) IV Push every 8 hours PRN  oxyCODONE    5 mG/acetaminophen 325 mG 2 Tablet(s) Oral every 4 hours PRN  polyethylene glycol 3350 17 Gram(s) Oral daily PRN    LABS:                        8.7    16.09 )-----------( 408      ( 08 Mar 2018 04:51 )             28.0              03-08    136  |  101  |  31<H>  ----------------------------<  226<H>  3.5   |  24  |  1.4    Ca    7.9<L>      08 Mar 2018 04:51  Mg     2.2     03-08    TPro  5.5<L>  /  Alb  2.4<L>  /  TBili  1.0  /  DBili  0.2  /  AST  16  /  ALT  22  /  AlkPhos  87  03-08    LIVER FUNCTIONS - ( 08 Mar 2018 04:51 )  Alb: 2.4 g/dL / Pro: 5.5 g/dL / ALK PHOS: 87 U/L / ALT: 22 U/L / AST: 16 U/L / GGT: x                      PT/INR - ( 08 Mar 2018 04:51 )   PT: >40.00 sec;   INR: 4.00 ratio                           ABG - ( 07 Mar 2018 04:37 )  pH: 7.55  /  pCO2: 32    /  pO2: 55    / HCO3: 28    / Base Excess: 5.5   /  SaO2: 91                    IMAGING: CXR unchanged from yesterday      ASSESSMENT & PLANS:    CNS: no sedation, monitor mental status    HEENT: oral care    PULMONARY: continue high flow nc, try to wean; plan for bronchial lavage tomorrow, npo at midnight; f/u autoimmune labs    CARDIOVASCULAR: monitor vital signs,     GI: NPO at midnight; diet as tolerated; per GI, recommend restarting tacrolimus for hepatitis    RENAL: monitor lytes and renal fn; lasix 40 mg for diuresis of bactrim fluids    INFECTIOUS DISEASE: bactrim for possible PCP pna    HEMATOLOGICAL: monitor cbc    ENDOCRINE: monitor glucose    MUSCULOSKELETAL: out of bed to chair as tolerated

## 2018-03-08 NOTE — PROGRESS NOTE ADULT - ASSESSMENT
72 yo female with hx of HTN, autoimmune hepatitis, asthma, DVT presented to the ED after rightleg wound. Had the wound sutured at [facility] site but then came back to the ED for worsening pain. She is s/p debridement with Burn team. Pt was transferred to medicine yesterday due to worsening SOB and requiring more O2.     Acute Respiratory Failure,  Pulmonary Edema vs Interstitial lung disease   - Remains O2 dependent   - Pulm and ID following.    - given IV lasix and IV solumedrol.    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    - INR supratherapeutic     - CT failed to show clot    Right leg laceration s/p debridement   - contact isolation for MRSA   - wound care as per Burn.     DVT ppx - Coumadin  Dispo - home  Code - full

## 2018-03-09 ENCOUNTER — RESULT REVIEW (OUTPATIENT)
Age: 73
End: 2018-03-09

## 2018-03-09 LAB
ANION GAP SERPL CALC-SCNC: 9 MMOL/L — SIGNIFICANT CHANGE UP (ref 7–14)
B PERT IGG+IGM PNL SER: (no result)
BUN SERPL-MCNC: 39 MG/DL — HIGH (ref 10–20)
CALCIUM SERPL-MCNC: 8.1 MG/DL — LOW (ref 8.5–10.1)
CHLORIDE SERPL-SCNC: 100 MMOL/L — SIGNIFICANT CHANGE UP (ref 98–110)
CO2 SERPL-SCNC: 24 MMOL/L — SIGNIFICANT CHANGE UP (ref 17–32)
COLOR FLD: SIGNIFICANT CHANGE UP
CREAT SERPL-MCNC: 1.5 MG/DL — SIGNIFICANT CHANGE UP (ref 0.7–1.5)
FLUID INTAKE SUBSTANCE CLASS: SIGNIFICANT CHANGE UP
FLUID SEGMENTED GRANULOCYTES: SIGNIFICANT CHANGE UP %
GLUCOSE SERPL-MCNC: 285 MG/DL — HIGH (ref 70–110)
GRAM STN FLD: SIGNIFICANT CHANGE UP
HCT VFR BLD CALC: 26.9 % — LOW (ref 37–47)
HGB BLD-MCNC: 8.2 G/DL — LOW (ref 12–16)
INR BLD: 5.63 RATIO — CRITICAL HIGH (ref 0.65–1.3)
LYMPHOCYTES # FLD: SIGNIFICANT CHANGE UP
MCHC RBC-ENTMCNC: 21.6 PG — LOW (ref 27–31)
MCHC RBC-ENTMCNC: 30.5 G/DL — LOW (ref 32–37)
MCV RBC AUTO: 71 FL — LOW (ref 81–99)
MONOS+MACROS # FLD: PRESENT % — SIGNIFICANT CHANGE UP
NON-GYNECOLOGICAL CYTOLOGY STUDY: SIGNIFICANT CHANGE UP
NRBC # BLD: 0 /100 WBCS — SIGNIFICANT CHANGE UP (ref 0–0)
OTHER CELLS FLD MANUAL: SIGNIFICANT CHANGE UP %
PLATELET # BLD AUTO: 437 K/UL — HIGH (ref 130–400)
POTASSIUM SERPL-MCNC: 3.9 MMOL/L — SIGNIFICANT CHANGE UP (ref 3.5–5)
POTASSIUM SERPL-SCNC: 3.9 MMOL/L — SIGNIFICANT CHANGE UP (ref 3.5–5)
PROTHROM AB SERPL-ACNC: >40 SEC — HIGH (ref 9.95–12.87)
RBC # BLD: 3.79 M/UL — LOW (ref 4.2–5.4)
RBC # FLD: 17 % — HIGH (ref 11.5–14.5)
RCV VOL RI: SIGNIFICANT CHANGE UP /UL (ref 0–5)
SODIUM SERPL-SCNC: 133 MMOL/L — LOW (ref 135–146)
SPECIMEN SOURCE: SIGNIFICANT CHANGE UP
TOTAL NUCLEATED CELL COUNT, BODY FLUID: 182 /UL — SIGNIFICANT CHANGE UP (ref 0–5)
TUBE TYPE: SIGNIFICANT CHANGE UP
WBC # BLD: 14.38 K/UL — HIGH (ref 4.8–10.8)
WBC # FLD AUTO: 14.38 K/UL — HIGH (ref 4.8–10.8)

## 2018-03-09 RX ORDER — FUROSEMIDE 40 MG
40 TABLET ORAL DAILY
Qty: 0 | Refills: 0 | Status: DISCONTINUED | OUTPATIENT
Start: 2018-03-09 | End: 2018-03-09

## 2018-03-09 RX ORDER — LIDOCAINE 4 G/100G
120 CREAM TOPICAL ONCE
Qty: 0 | Refills: 0 | Status: DISCONTINUED | OUTPATIENT
Start: 2018-03-09 | End: 2018-03-09

## 2018-03-09 RX ORDER — LIDOCAINE 4 G/100G
10 CREAM TOPICAL ONCE
Qty: 0 | Refills: 0 | Status: DISCONTINUED | OUTPATIENT
Start: 2018-03-09 | End: 2018-03-09

## 2018-03-09 RX ORDER — FUROSEMIDE 40 MG
40 TABLET ORAL ONCE
Qty: 0 | Refills: 0 | Status: COMPLETED | OUTPATIENT
Start: 2018-03-09 | End: 2018-03-09

## 2018-03-09 RX ORDER — PHYTONADIONE (VIT K1) 5 MG
2.5 TABLET ORAL ONCE
Qty: 0 | Refills: 0 | Status: DISCONTINUED | OUTPATIENT
Start: 2018-03-09 | End: 2018-03-09

## 2018-03-09 RX ORDER — PHYTONADIONE (VIT K1) 5 MG
2.5 TABLET ORAL ONCE
Qty: 0 | Refills: 0 | Status: COMPLETED | OUTPATIENT
Start: 2018-03-09 | End: 2018-03-09

## 2018-03-09 RX ORDER — PANTOPRAZOLE SODIUM 20 MG/1
40 TABLET, DELAYED RELEASE ORAL
Qty: 0 | Refills: 0 | Status: DISCONTINUED | OUTPATIENT
Start: 2018-03-09 | End: 2018-03-23

## 2018-03-09 RX ORDER — FUROSEMIDE 40 MG
40 TABLET ORAL ONCE
Qty: 0 | Refills: 0 | Status: DISCONTINUED | OUTPATIENT
Start: 2018-03-09 | End: 2018-03-09

## 2018-03-09 RX ADMIN — AMLODIPINE BESYLATE 10 MILLIGRAM(S): 2.5 TABLET ORAL at 06:13

## 2018-03-09 RX ADMIN — Medication 600 MILLIGRAM(S): at 18:35

## 2018-03-09 RX ADMIN — Medication 3 MILLILITER(S): at 19:58

## 2018-03-09 RX ADMIN — Medication 260 MILLIGRAM(S): at 12:03

## 2018-03-09 RX ADMIN — MORPHINE SULFATE 4 MILLIGRAM(S): 50 CAPSULE, EXTENDED RELEASE ORAL at 06:11

## 2018-03-09 RX ADMIN — Medication 80 MILLIGRAM(S): at 06:12

## 2018-03-09 RX ADMIN — Medication 25 MILLIGRAM(S): at 18:19

## 2018-03-09 RX ADMIN — Medication 650 MILLIGRAM(S): at 18:56

## 2018-03-09 RX ADMIN — Medication 260 MILLIGRAM(S): at 06:14

## 2018-03-09 RX ADMIN — Medication 260 MILLIGRAM(S): at 00:11

## 2018-03-09 RX ADMIN — MORPHINE SULFATE 4 MILLIGRAM(S): 50 CAPSULE, EXTENDED RELEASE ORAL at 06:58

## 2018-03-09 RX ADMIN — Medication 80 MILLIGRAM(S): at 12:04

## 2018-03-09 RX ADMIN — CHLORHEXIDINE GLUCONATE 1 APPLICATION(S): 213 SOLUTION TOPICAL at 12:59

## 2018-03-09 RX ADMIN — Medication 40 MILLIGRAM(S): at 22:01

## 2018-03-09 RX ADMIN — MONTELUKAST 10 MILLIGRAM(S): 4 TABLET, CHEWABLE ORAL at 12:03

## 2018-03-09 RX ADMIN — Medication 3 MILLILITER(S): at 08:47

## 2018-03-09 RX ADMIN — Medication 80 MILLIGRAM(S): at 00:02

## 2018-03-09 RX ADMIN — Medication 100 MILLIGRAM(S): at 22:01

## 2018-03-09 RX ADMIN — Medication 600 MILLIGRAM(S): at 23:37

## 2018-03-09 RX ADMIN — Medication 25 MILLIGRAM(S): at 06:13

## 2018-03-09 RX ADMIN — SENNA PLUS 2 TABLET(S): 8.6 TABLET ORAL at 22:01

## 2018-03-09 RX ADMIN — Medication 40 MILLIGRAM(S): at 06:12

## 2018-03-09 RX ADMIN — Medication 3 MILLILITER(S): at 13:12

## 2018-03-09 RX ADMIN — ONDANSETRON 4 MILLIGRAM(S): 8 TABLET, FILM COATED ORAL at 00:11

## 2018-03-09 RX ADMIN — Medication 2.5 MILLIGRAM(S): at 16:52

## 2018-03-09 RX ADMIN — Medication 650 MILLIGRAM(S): at 19:30

## 2018-03-09 RX ADMIN — ONDANSETRON 4 MILLIGRAM(S): 8 TABLET, FILM COATED ORAL at 14:56

## 2018-03-09 NOTE — PROGRESS NOTE ADULT - ASSESSMENT
72 yo female with hx of HTN, autoimmune hepatitis, asthma, DVT presented to the ED after rightleg wound. Had the wound sutured at [place] site but then came back to the ED for worsening pain. She is s/p debridement with Burn team. Pt was transferred to medicine yesterday due to worsening SOB and requiring more O2.     Acute Respiratory Failure,  Pulmonary Edema vs Interstitial lung disease, r/o PCP   - remains in ICU setting   - Remains O2 dependent   - Pulm f/u noted   - continue on IV lasix and IV solumedrol.    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    - INR remains supratherapeutic     - Coumadin on hold   - continue to monitor daily INR's    Right leg laceration s/p debridement   - contact isolation for MRSA   - wound care as per Burn    HTN   - continue Norvasc and Metoprolol   - monitor BP    Nutritional support  GI prophylaxsis    Full code status

## 2018-03-09 NOTE — PROGRESS NOTE ADULT - SUBJECTIVE AND OBJECTIVE BOX
ELDER, DONI  73y  Female  HPI:  Pt states she fell Monday and went for evaluation to [facility] and laceration was sutured and pt was discharged. Pt came back today for increasing pain in right LE. Patient denies calf pain, fevers, chest pain, SOB, abdominal pain, nausea, vomiting, diarrhea, dizziness, weakness. (27 Feb 2018 13:32)    MEDICATIONS  (STANDING):  ALBUTerol    90 MICROgram(s) HFA Inhaler 1 Puff(s) Inhalation every 4 hours  ALBUTerol/ipratropium for Nebulization 3 milliLiter(s) Nebulizer every 6 hours  amLODIPine   Tablet 10 milliGRAM(s) Oral every 24 hours  chlorhexidine 4% Liquid 1 Application(s) Topical daily  docusate sodium 100 milliGRAM(s) Oral three times a day  furosemide   Injectable 40 milliGRAM(s) IV Push two times a day  lidocaine 2% Viscous 120 milliLiter(s) Oral once  methylPREDNISolone sodium succinate Injectable 80 milliGRAM(s) IV Push every 6 hours  metolazone 2.5 milliGRAM(s) Oral daily  metoprolol     tartrate 25 milliGRAM(s) Oral two times a day  montelukast 10 milliGRAM(s) Oral daily  pantoprazole   Suspension 40 milliGRAM(s) Oral daily  senna 2 Tablet(s) Oral at bedtime  tiotropium 18 MICROgram(s) Capsule 1 Capsule(s) Inhalation daily  trimethoprim / sulfamethoxazole IVPB 320 milliGRAM(s) IV Intermittent every 6 hours    MEDICATIONS  (PRN):  acetaminophen   Tablet. 650 milliGRAM(s) Oral every 6 hours PRN Moderate Pain (4 - 6)  morphine  - Injectable 4 milliGRAM(s) IV Push four times a day PRN Severe Pain (7 - 10)  ondansetron Injectable 4 milliGRAM(s) IV Push every 8 hours PRN Nausea and/or Vomiting  oxyCODONE    5 mG/acetaminophen 325 mG 2 Tablet(s) Oral every 4 hours PRN Moderate Pain (4 - 6)  polyethylene glycol 3350 17 Gram(s) Oral daily PRN Constipation    INTERVAL EVENTS: Patient seen today, had bronch this morning, patient requiring increased O2, however appears comfortable and unlabored.    T(C): 35.8 (03-09-18 @ 04:00), Max: 36.1 (03-08-18 @ 20:00)  HR: 100 (03-09-18 @ 09:17) (76 - 100)  BP: 104/53 (03-09-18 @ 09:17) (85/62 - 131/61)  RR: 27 (03-09-18 @ 09:17) (15 - 41)  SpO2: 100% (03-09-18 @ 09:17) (94% - 100%)  Wt(kg): --Vital Signs Last 24 Hrs  T(C): 35.8 (09 Mar 2018 04:00), Max: 36.1 (08 Mar 2018 20:00)  T(F): 96.5 (09 Mar 2018 04:00), Max: 97 (08 Mar 2018 20:00)  HR: 100 (09 Mar 2018 09:17) (76 - 100)  BP: 104/53 (09 Mar 2018 09:17) (85/62 - 131/61)  BP(mean): 87 (09 Mar 2018 09:17) (65 - 90)  RR: 27 (09 Mar 2018 09:17) (15 - 41)  SpO2: 100% (09 Mar 2018 09:17) (94% - 100%)    PHYSICAL EXAM:  GENERAL: NAD  NECK: Supple, No JVD  CHEST/LUNG: Decreased BS  HEART: S1, S2  ABDOMEN: Soft, Nontender, Nondistended; Bowel sounds present  EXTREMITIES: trace edema  SKIN: RLE wound bandaged    LABS:  Labs:                        8.2    14.38 )-----------( 437      ( 09 Mar 2018 04:49 )             26.9             03-09    133<L>  |  100  |  39<H>  ----------------------------<  285<H>  3.9   |  24  |  1.5    Ca    8.1<L>      09 Mar 2018 04:49  Mg     2.2     03-08    TPro  5.5<L>  /  Alb  2.4<L>  /  TBili  1.0  /  DBili  0.2  /  AST  16  /  ALT  22  /  AlkPhos  87  03-08    LIVER FUNCTIONS - ( 08 Mar 2018 04:51 )  Alb: 2.4 g/dL / Pro: 5.5 g/dL / ALK PHOS: 87 U/L / ALT: 22 U/L / AST: 16 U/L / GGT: x                 PT/INR - ( 09 Mar 2018 04:49 )   PT: >40.00 sec;   INR: 5.63 ratio           Culture - Surgical Swab (02.28.18 @ 16:48)    -  Ampicillin/Sulbactam: R 16/8    -  Cefazolin: R <=4    -  Ciprofloxacin: R >2    -  Clindamycin: S <=0.25    -  RIF- Rifampin: S <=1    -  Tetra/Doxy: S <=1    -  Trimethoprim/Sulfamethoxazole: S <=0.5/9.5    -  Vancomycin: S 2    -  Daptomycin: S 1    -  Erythromycin: R >4    -  Gentamicin: S <=1    -  Levofloxacin: R >4    -  Linezolid: S 2    -  Moxifloxacin(Aerobic): R 2    -  Oxacillin: R >2    -  Penicillin: R >8    Specimen Source: .Surgical Swab None    Culture Results:   Moderate Methicillin resistant Staphylococcus aureus  Few Corynebacterium species  Growth in fluid media only Alpha hemolytic strep    Organism Identification: Methicillin resistant Staphylococcus aureus    Organism: Methicillin resistant Staphylococcus aureus    Method Type: EROS      RADIOLOGY & ADDITIONAL TESTS:  < from: Xray Chest 1 View- PORTABLE-Routine (03.09.18 @ 04:36) >  Impression:      No focal pulmonary consolidation.    Stable calcified pleural plaques.      < end of copied text >

## 2018-03-09 NOTE — PROGRESS NOTE ADULT - SUBJECTIVE AND OBJECTIVE BOX
PGY I NOTE    LENGTH OF HOSPITAL STAY:  10d    CHIEF COMPLAINT:Patient is a 73y old  Female who presents with a chief complaint of right leg wound, needs debridement (04 Mar 2018 15:38)    HPI:HPI:  Pt states she fell Monday and went for evaluation to [facility] and laceration was sutured and pt was discharged. Pt came back today for increasing pain in right LE. Patient denies calf pain, fevers, chest pain, SOB, abdominal pain, nausea, vomiting, diarrhea, dizziness, weakness. (27 Feb 2018 13:32)    OVERNIGHT EVENTS/INTERVAL UPDATES:    PMH & PSH  PAST MEDICAL & SURGICAL HISTORY:  Essential hypertension  Autoimmune hepatitis treated with steroids  Asthma  DVT (deep venous thrombosis)  No significant past surgical history    SOCIAL HISTORY: Negative    ALLERGIES: No Known Allergies    HOME MEDICATIONS  Home Medications:  amLODIPine 10 mg oral tablet: 1 tab(s) orally once a day (27 Feb 2018 13:44)  budesonide 3 mg oral capsule, extended release: 1 cap(s) orally 2 times a day (27 Feb 2018 22:03)  metoprolol tartrate 50 mg oral tablet: 1 tab(s) orally once a day (27 Feb 2018 13:44)  predniSONE 10 mg oral tablet: 1 tab(s) orally once a day (27 Feb 2018 13:44)  Singulair 10 mg oral tablet: 1 tab(s) orally once a day (27 Feb 2018 13:44)  tacrolimus 0.5 mg oral capsule: 1 cap(s) orally once a day (at bedtime) (27 Feb 2018 22:01)  tacrolimus 1 mg oral capsule: orally once a day in the morning  (27 Feb 2018 22:00)    PHYSICAL EXAM:  T(F): 96.5 (03-09-18 @ 04:00), Max: 97 (03-08-18 @ 20:00)  HR: 80 (03-09-18 @ 07:00)  BP: 110/61 (03-09-18 @ 07:00)  RR: 35 (03-09-18 @ 07:00)  SpO2: 97% (03-09-18 @ 07:00)  CAPILLARY BLOOD GLUCOSE          03-08-18 @ 07:01  -  03-09-18 @ 07:00  --------------------------------------------------------  IN:    IV PiggyBack: 2000 mL  Total IN: 2000 mL    OUT:    Voided: 500 mL  Total OUT: 500 mL    Total NET: 1500 mL            General: NAD  HEENT: NCAT  CV: RRR  RESP: CTAB  Abdominal: Soft, NTTP  Extremity: no c/c/e  Neuro: A&O x3    MEDICATIONS  STANDING MEDICATIONS  ALBUTerol    90 MICROgram(s) HFA Inhaler 1 Puff(s) Inhalation every 4 hours  ALBUTerol/ipratropium for Nebulization 3 milliLiter(s) Nebulizer every 6 hours  amLODIPine   Tablet 10 milliGRAM(s) Oral every 24 hours  chlorhexidine 4% Liquid 1 Application(s) Topical daily  docusate sodium 100 milliGRAM(s) Oral three times a day  furosemide   Injectable 40 milliGRAM(s) IV Push two times a day  lidocaine 2% Viscous 120 milliLiter(s) Oral once  methylPREDNISolone sodium succinate Injectable 80 milliGRAM(s) IV Push every 6 hours  metoprolol     tartrate 25 milliGRAM(s) Oral two times a day  montelukast 10 milliGRAM(s) Oral daily  pantoprazole   Suspension 40 milliGRAM(s) Oral daily  senna 2 Tablet(s) Oral at bedtime  tiotropium 18 MICROgram(s) Capsule 1 Capsule(s) Inhalation daily  trimethoprim / sulfamethoxazole IVPB 320 milliGRAM(s) IV Intermittent every 6 hours    PRN MEDICATIONS  acetaminophen   Tablet. 650 milliGRAM(s) Oral every 6 hours PRN  morphine  - Injectable 4 milliGRAM(s) IV Push four times a day PRN  ondansetron Injectable 4 milliGRAM(s) IV Push every 8 hours PRN  oxyCODONE    5 mG/acetaminophen 325 mG 2 Tablet(s) Oral every 4 hours PRN  polyethylene glycol 3350 17 Gram(s) Oral daily PRN    LABS:                        8.2    14.38 )-----------( 437      ( 09 Mar 2018 04:49 )             26.9              03-09    133<L>  |  100  |  39<H>  ----------------------------<  285<H>  3.9   |  24  |  1.5    Ca    8.1<L>      09 Mar 2018 04:49  Mg     2.2     03-08    TPro  5.5<L>  /  Alb  2.4<L>  /  TBili  1.0  /  DBili  0.2  /  AST  16  /  ALT  22  /  AlkPhos  87  03-08    LIVER FUNCTIONS - ( 08 Mar 2018 04:51 )  Alb: 2.4 g/dL / Pro: 5.5 g/dL / ALK PHOS: 87 U/L / ALT: 22 U/L / AST: 16 U/L / GGT: x                      PT/INR - ( 09 Mar 2018 04:49 )   PT: >40.00 sec;   INR: 5.63 ratio                                 IMAGING: cxr stable      ASSESSMENT & PLANS:    CNS: no sedation; monitor    HEENT: oral care    PULMONARY: high flow nasal cannula, fungitell, bronchial lavage today, send off PCP and cell counts    CARDIOVASCULAR: continue amlodipine and metoprolol    GI: diet as tolerated; possibly start prograf later pending sputum results; continue methylprednisolone    RENAL: lasix 40 BID, add xiroxin 2.5    INFECTIOUS DISEASE: continue bactrim until PCP results for coverage of PCP pneumonia    HEMATOLOGICAL: monitor cbc, monitor coags    ENDOCRINE: monitor glucose

## 2018-03-09 NOTE — PROGRESS NOTE ADULT - ASSESSMENT
Patient is a 73y old  Female who presented initially for RLE wound s/p debridement with + ORSA. No abscess was found as per ID so ABx were d/c. Course was complicated by respiratory distress suspected to be caused by Tacrolimus being given for autoimmune hepatitis.      *Autoimmune hepatitis   At home: On Tacrolimus 1 mg am and 0.5 mg pm  And Prednisone 10 mg od   And Budesonide 3 mg po q12h  Currently steroids shifted to solumedrol 80 mg iv q6h     Check Tacrolimus level   check us abdomen   Monitor daily LFTs, INR, MELD-Na score  Get records from [place]   I called yesterday Dr Galvez's office and spoke to the covering physician and informed her about Mrs Hudson's status  No further recommendations other than keeping her on the same medications for AIH    Without good evidence of pulmonary toxicity , would continue treating her with Medication for AIH

## 2018-03-09 NOTE — PROGRESS NOTE ADULT - SUBJECTIVE AND OBJECTIVE BOX
INTERVAL HPI/OVERNIGHT EVENTS:    Patient is a 73y old  Female with pmh as below who presented initially for RLE wound s/p debridement with + ORSA. No abscess was found as per ID so ABx were d/c. Course was complicated by respiratory distress suspected to be caused by Tacrolimus being given for autoimmune hepatitis.      Patient follows at [facility] for her autoimmune hepatitis. She notes that she was diagnosed with AI in 2015 based on liver biopsy done at [facility]. Liver bx showing severe autoimmune hepatitis grade 4/4, stage 2/4, AIH vs DILI-AIH. She did not tolerate azathioprine, cyclosporine among other meds but since 2016 was maintained on Prednisone, Budesonide 3 mg q12h, Tacrolimus 1 mg am and 1 mg pm with fine adjustment of prednisone dose according to clinical status and blood work. She gets Prograf levels, LFTs ... checked q 2 weeks. She was previously assessed for liver transplant referral but was told that she is not a candidate because of her age.     Never had a paracentesis, never had variceal bleeding, never was encephalopathic     GI Dr Galvez at [facility]   Colonoscopy long time ago   EGD 6 months ago   FH non sign   usually has 2-3 normal bm per day, good appetite     3/9 no complaints, no more diarrhea     ROS wnl     PAST MEDICAL & SURGICAL HISTORY:  Essential hypertension  Autoimmune hepatitis treated with steroids  Asthma  DVT (deep venous thrombosis)  No significant past surgical history      Home Medications:  amLODIPine 10 mg oral tablet: 1 tab(s) orally once a day (27 Feb 2018 13:44)  budesonide 3 mg oral capsule, extended release: 1 cap(s) orally 2 times a day (27 Feb 2018 22:03)  metoprolol tartrate 50 mg oral tablet: 1 tab(s) orally once a day (27 Feb 2018 13:44)  predniSONE 10 mg oral tablet: 1 tab(s) orally once a day (27 Feb 2018 13:44)  Singulair 10 mg oral tablet: 1 tab(s) orally once a day (27 Feb 2018 13:44)  tacrolimus 0.5 mg oral capsule: 1 cap(s) orally once a day (at bedtime) (27 Feb 2018 22:01)  tacrolimus 1 mg oral capsule: orally once a day in the morning  (27 Feb 2018 22:00)      MEDICATIONS  (STANDING):  ALBUTerol    90 MICROgram(s) HFA Inhaler 1 Puff(s) Inhalation every 4 hours  ALBUTerol/ipratropium for Nebulization 3 milliLiter(s) Nebulizer every 6 hours  amLODIPine   Tablet 10 milliGRAM(s) Oral every 24 hours  chlorhexidine 4% Liquid 1 Application(s) Topical daily  docusate sodium 100 milliGRAM(s) Oral three times a day  furosemide   Injectable 40 milliGRAM(s) IV Push two times a day  lidocaine 2% Viscous 10 milliLiter(s) Oral once  methylPREDNISolone sodium succinate Injectable 80 milliGRAM(s) IV Push every 6 hours  metoprolol     tartrate 25 milliGRAM(s) Oral two times a day  montelukast 10 milliGRAM(s) Oral daily  pantoprazole   Suspension 40 milliGRAM(s) Oral daily  senna 2 Tablet(s) Oral at bedtime  tiotropium 18 MICROgram(s) Capsule 1 Capsule(s) Inhalation daily  trimethoprim / sulfamethoxazole IVPB 320 milliGRAM(s) IV Intermittent every 6 hours    MEDICATIONS  (PRN):  acetaminophen   Tablet. 650 milliGRAM(s) Oral every 6 hours PRN Moderate Pain (4 - 6)  morphine  - Injectable 4 milliGRAM(s) IV Push four times a day PRN Severe Pain (7 - 10)  ondansetron Injectable 4 milliGRAM(s) IV Push every 8 hours PRN Nausea and/or Vomiting  oxyCODONE    5 mG/acetaminophen 325 mG 2 Tablet(s) Oral every 4 hours PRN Moderate Pain (4 - 6)  polyethylene glycol 3350 17 Gram(s) Oral daily PRN Constipation      Allergies    No Known Allergies    Intolerances            PHYSICAL EXAM:   Vital Signs:  Vital Signs Last 24 Hrs  T(C): 35.8 (09 Mar 2018 04:00), Max: 36.1 (08 Mar 2018 20:00)  T(F): 96.5 (09 Mar 2018 04:00), Max: 97 (08 Mar 2018 20:00)  HR: 80 (09 Mar 2018 07:00) (76 - 100)  BP: 110/61 (09 Mar 2018 07:00) (85/62 - 131/61)  BP(mean): 82 (09 Mar 2018 06:00) (65 - 90)  RR: 35 (09 Mar 2018 07:00) (15 - 41)  SpO2: 97% (09 Mar 2018 07:00) (94% - 100%)  Daily     Daily     GENERAL:  no distress  HEENT:  NC/AT,  anicteric  CHEST:   no increased effort, breath sounds clear  HEART:  Regular rhythm  ABDOMEN:  Soft, non-tender, non-distended, normoactive bowel sounds,  no masses ,no hepato-splenomegaly, no signs of chronic liver disease  EXTEREMITIES:  no cyanosis      LABS:                        8.2    14.38 )-----------( 437      ( 09 Mar 2018 04:49 )             26.9       Hemoglobin: 8.2 g/dL (03-09-18 @ 04:49)  Hemoglobin: 8.7 g/dL (03-08-18 @ 04:51)  Hemoglobin: 8.5 g/dL (03-07-18 @ 04:18)  Hemoglobin: 8.0 g/dL (03-06-18 @ 11:46)      03-09    133<L>  |  100  |  39<H>  ----------------------------<  285<H>  3.9   |  24  |  1.5    Ca    8.1<L>      09 Mar 2018 04:49  Mg     2.2     03-08    TPro  5.5<L>  /  Alb  2.4<L>  /  TBili  1.0  /  DBili  0.2  /  AST  16  /  ALT  22  /  AlkPhos  87  03-08      INR: 5.63 ratio (03-09-18 @ 04:49)  INR: 4.00 ratio (03-08-18 @ 04:51)  INR: 3.85 ratio (03-07-18 @ 04:18)  INR: 4.59 ratio (03-06-18 @ 12:02)      Aspartate Aminotransferase (AST/SGOT): 16 U/L (03-08-18 @ 04:51)  Alkaline Phosphatase, Serum: 87 U/L (03-08-18 @ 04:51)  Bilirubin Direct, Serum: 0.2 mg/dL (03-08-18 @ 04:51)  Alanine Aminotransferase (ALT/SGPT): 22 U/L (03-08-18 @ 04:51)            RADIOLOGY & ADDITIONAL TESTS:

## 2018-03-09 NOTE — PROGRESS NOTE ADULT - SUBJECTIVE AND OBJECTIVE BOX
DATE OF PROCEDURE: 03/09/18    PREOPERATIVE DIAGNOSIS: Bilateral pulmonary infiltrates; R/o PCP; Alveolar hemorrhage    POSTOPERATIVE DIAGNOSES:       PROCEDURE PERFORMED:  Bronchoscopy,   washing.         Attending: Dr Price         ASSISTANT: Dr Asif         CONSENT: After explanining the risk and benefit the consent was obtained from Patient.      The patient had been previously on High Flow oxygen. Periprocedural sedation was obtained with propofol. Her FiO2 was increased to 100% during the procedure. The  fiberoptic bronchoscope was introduced through the oral cavity.   The right tracheobronchial tree was inspected and BAL was obtained from the middle lobe. Samples demonstrated worsening bloody appearance with  each BAL.   The procedure was completed and all samples were submitted for appropriate studies  After adequate clearing of secretions was accomplished, the bronchoscope was removed from the patient and the procedure was ended.   The patient tolerated the procedure well and there were no complications.

## 2018-03-09 NOTE — PROGRESS NOTE ADULT - ASSESSMENT
IMPRESSION:    AHRF Inflammatory vs infectious infiltrate?  HO PCP pneumonia   HO autoimmune hepatitis     PLAN:    CNS: no depressants    HEENT: Oral care    PULMONARY:  HOB @ 45 degrees.  Wean O2 not flow.  Possible bronchoscopy in AM     CARDIOVASCULAR:  I = O Start Lasix 40 mg BID .  Add Zaroxylin 2.5 mg daily     GI: GI prophylaxis.  Feeding  after bronchoscopy     RENAL:  Follow up lytes.  Correct as needed    INFECTIOUS DISEASE: Follow up cultures.  Start Bactrim     HEMATOLOGICAL:  DVT prophylaxis.    ENDOCRINE:  Follow up FS.  Insulin protocol if needed.    MUSCULOSKELETAL:    Continue Solumedrol.     Possible restart Prograf today    DW patient risks of Bronchoscopy including MV.  She agrees IMPRESSION:    AHRF Inflammatory vs infectious infiltrate?  HO PCP pneumonia   HO autoimmune hepatitis     PLAN:    CNS: no depressants    HEENT: Oral care    PULMONARY:  HOB @ 45 degrees.  Wean O2 not flow.  Possible bronchoscopy in AM     CARDIOVASCULAR:  I = O Start Lasix 40 mg BID .  Add Zaroxylin 2.5 mg daily     GI: GI prophylaxis.  Feeding  after bronchoscopy     RENAL:  Follow up lytes.  Correct as needed    INFECTIOUS DISEASE: Follow up cultures.  Start Bactrim     HEMATOLOGICAL:  DVT prophylaxis.    ENDOCRINE:  Follow up FS.  Insulin protocol if needed.    MUSCULOSKELETAL:    Continue Solumedrol.     Possible restart Prograf today    DW patient risks of Bronchoscopy including MV.  She agrees     Addendum:  Bronchoscopy done.  BAL consistent with DAH.  CYtology for PCP negative.  Recommend Reverse INR with FFP and Vit K.  DC Bactrim LAsix only 40 QD to keep I=O.  DC Zaroxolyn.  Increase Solumedrol to 125 mg Q6.

## 2018-03-09 NOTE — PROGRESS NOTE ADULT - SUBJECTIVE AND OBJECTIVE BOX
Over Night Events:        ROS:  See HPI    PHYSICAL EXAM    ICU Vital Signs Last 24 Hrs  T(C): 35.8 (09 Mar 2018 04:00), Max: 36.1 (08 Mar 2018 20:00)  T(F): 96.5 (09 Mar 2018 04:00), Max: 97 (08 Mar 2018 20:00)  HR: 80 (09 Mar 2018 07:00) (76 - 100)  BP: 110/61 (09 Mar 2018 07:00) (85/62 - 131/61)  BP(mean): 82 (09 Mar 2018 06:00) (65 - 90)  ABP: --  ABP(mean): --  RR: 35 (09 Mar 2018 07:00) (15 - 41)  SpO2: 97% (09 Mar 2018 07:00) (94% - 100%)      General:  HEENT: LUIS A             Lymph Nodes: No cervical LN   Lungs: Bilateral BS  Cardiovascular: Regular   Abdomen: Soft, Positive BS  Extremities: No clubbing   Skin: Warm  Neurological: Non focal       03-08-18 @ 07:01  -  03-09-18 @ 07:00  --------------------------------------------------------  IN:    IV PiggyBack: 2000 mL  Total IN: 2000 mL    OUT:    Voided: 500 mL  Total OUT: 500 mL    Total NET: 1500 mL          LABS:                            8.2    14.38 )-----------( 437      ( 09 Mar 2018 04:49 )             26.9                                               03-09    133<L>  |  100  |  39<H>  ----------------------------<  285<H>  3.9   |  24  |  1.5    Ca    8.1<L>      09 Mar 2018 04:49  Mg     2.2     03-08    TPro  5.5<L>  /  Alb  2.4<L>  /  TBili  1.0  /  DBili  0.2  /  AST  16  /  ALT  22  /  AlkPhos  87  03-08      PT/INR - ( 09 Mar 2018 04:49 )   PT: >40.00 sec;   INR: 5.63 ratio                                                                                              LIVER FUNCTIONS - ( 08 Mar 2018 04:51 )  Alb: 2.4 g/dL / Pro: 5.5 g/dL / ALK PHOS: 87 U/L / ALT: 22 U/L / AST: 16 U/L / GGT: x                                                                                                                                       MEDICATIONS  (STANDING):  ALBUTerol    90 MICROgram(s) HFA Inhaler 1 Puff(s) Inhalation every 4 hours  ALBUTerol/ipratropium for Nebulization 3 milliLiter(s) Nebulizer every 6 hours  amLODIPine   Tablet 10 milliGRAM(s) Oral every 24 hours  chlorhexidine 4% Liquid 1 Application(s) Topical daily  docusate sodium 100 milliGRAM(s) Oral three times a day  furosemide   Injectable 40 milliGRAM(s) IV Push two times a day  lidocaine 2% Viscous 120 milliLiter(s) Oral once  methylPREDNISolone sodium succinate Injectable 80 milliGRAM(s) IV Push every 6 hours  metoprolol     tartrate 25 milliGRAM(s) Oral two times a day  montelukast 10 milliGRAM(s) Oral daily  pantoprazole   Suspension 40 milliGRAM(s) Oral daily  senna 2 Tablet(s) Oral at bedtime  tiotropium 18 MICROgram(s) Capsule 1 Capsule(s) Inhalation daily  trimethoprim / sulfamethoxazole IVPB 320 milliGRAM(s) IV Intermittent every 6 hours    MEDICATIONS  (PRN):  acetaminophen   Tablet. 650 milliGRAM(s) Oral every 6 hours PRN Moderate Pain (4 - 6)  morphine  - Injectable 4 milliGRAM(s) IV Push four times a day PRN Severe Pain (7 - 10)  ondansetron Injectable 4 milliGRAM(s) IV Push every 8 hours PRN Nausea and/or Vomiting  oxyCODONE    5 mG/acetaminophen 325 mG 2 Tablet(s) Oral every 4 hours PRN Moderate Pain (4 - 6)  polyethylene glycol 3350 17 Gram(s) Oral daily PRN Constipation      Xrays:       Improving infiltrate                                                                              ECHO

## 2018-03-10 LAB
ALBUMIN SERPL ELPH-MCNC: 2.9 G/DL — LOW (ref 3–5.5)
ALP SERPL-CCNC: 77 U/L — SIGNIFICANT CHANGE UP (ref 30–115)
ALT FLD-CCNC: 25 U/L — SIGNIFICANT CHANGE UP (ref 0–41)
ANION GAP SERPL CALC-SCNC: 12 MMOL/L — SIGNIFICANT CHANGE UP (ref 7–14)
APTT BLD: 28.2 SEC — SIGNIFICANT CHANGE UP (ref 27–39.2)
AST SERPL-CCNC: 24 U/L — SIGNIFICANT CHANGE UP (ref 0–41)
BILIRUB SERPL-MCNC: 0.9 MG/DL — SIGNIFICANT CHANGE UP (ref 0.2–1.2)
BLD GP AB SCN SERPL QL: SIGNIFICANT CHANGE UP
BUN SERPL-MCNC: 45 MG/DL — HIGH (ref 10–20)
CALCIUM SERPL-MCNC: 8.2 MG/DL — LOW (ref 8.5–10.1)
CHLORIDE SERPL-SCNC: 97 MMOL/L — LOW (ref 98–110)
CO2 SERPL-SCNC: 26 MMOL/L — SIGNIFICANT CHANGE UP (ref 17–32)
CREAT SERPL-MCNC: 2.1 MG/DL — HIGH (ref 0.7–1.5)
GLUCOSE SERPL-MCNC: 229 MG/DL — HIGH (ref 70–110)
HCT VFR BLD CALC: 20.9 % — LOW (ref 37–47)
HCT VFR BLD CALC: 23.7 % — LOW (ref 37–47)
HGB BLD-MCNC: 6.5 G/DL — CRITICAL LOW (ref 12–16)
HGB BLD-MCNC: 7.4 G/DL — CRITICAL LOW (ref 12–16)
INR BLD: 3.25 RATIO — HIGH (ref 0.65–1.3)
LDH SERPL L TO P-CCNC: 306 U/L — HIGH (ref 60–200)
MCHC RBC-ENTMCNC: 21.7 PG — LOW (ref 27–31)
MCHC RBC-ENTMCNC: 22 PG — LOW (ref 27–31)
MCHC RBC-ENTMCNC: 31.1 G/DL — LOW (ref 32–37)
MCHC RBC-ENTMCNC: 31.2 G/DL — LOW (ref 32–37)
MCV RBC AUTO: 69.9 FL — LOW (ref 81–99)
MCV RBC AUTO: 70.3 FL — LOW (ref 81–99)
NIGHT BLUE STAIN TISS: SIGNIFICANT CHANGE UP
NRBC # BLD: 0 /100 WBCS — SIGNIFICANT CHANGE UP (ref 0–0)
NRBC # BLD: 0 /100 WBCS — SIGNIFICANT CHANGE UP (ref 0–0)
PLATELET # BLD AUTO: 372 K/UL — SIGNIFICANT CHANGE UP (ref 130–400)
PLATELET # BLD AUTO: 422 K/UL — HIGH (ref 130–400)
POTASSIUM SERPL-MCNC: 4.1 MMOL/L — SIGNIFICANT CHANGE UP (ref 3.5–5)
POTASSIUM SERPL-SCNC: 4.1 MMOL/L — SIGNIFICANT CHANGE UP (ref 3.5–5)
PROT SERPL-MCNC: 5.6 G/DL — LOW (ref 6–8)
PROTHROM AB SERPL-ACNC: 35.9 SEC — HIGH (ref 9.95–12.87)
RBC # BLD: 2.99 M/UL — LOW (ref 4.2–5.4)
RBC # BLD: 3.37 M/UL — LOW (ref 4.2–5.4)
RBC # BLD: 3.37 M/UL — LOW (ref 4.2–5.4)
RBC # FLD: 16.8 % — HIGH (ref 11.5–14.5)
RBC # FLD: 17.2 % — HIGH (ref 11.5–14.5)
RETICS #: 95.4 K/UL — SIGNIFICANT CHANGE UP (ref 25–125)
RETICS/RBC NFR: 2.8 % — HIGH (ref 0.5–1.5)
SODIUM SERPL-SCNC: 135 MMOL/L — SIGNIFICANT CHANGE UP (ref 135–146)
SPECIMEN SOURCE: SIGNIFICANT CHANGE UP
TYPE + AB SCN PNL BLD: SIGNIFICANT CHANGE UP
WBC # BLD: 13.18 K/UL — HIGH (ref 4.8–10.8)
WBC # BLD: 9.91 K/UL — SIGNIFICANT CHANGE UP (ref 4.8–10.8)
WBC # FLD AUTO: 13.18 K/UL — HIGH (ref 4.8–10.8)
WBC # FLD AUTO: 9.91 K/UL — SIGNIFICANT CHANGE UP (ref 4.8–10.8)

## 2018-03-10 RX ORDER — MEROPENEM 1 G/30ML
1000 INJECTION INTRAVENOUS EVERY 8 HOURS
Qty: 0 | Refills: 0 | Status: DISCONTINUED | OUTPATIENT
Start: 2018-03-10 | End: 2018-03-12

## 2018-03-10 RX ORDER — VANCOMYCIN HCL 1 G
750 VIAL (EA) INTRAVENOUS ONCE
Qty: 0 | Refills: 0 | Status: COMPLETED | OUTPATIENT
Start: 2018-03-10 | End: 2018-03-10

## 2018-03-10 RX ORDER — PHYTONADIONE (VIT K1) 5 MG
2.5 TABLET ORAL ONCE
Qty: 0 | Refills: 0 | Status: COMPLETED | OUTPATIENT
Start: 2018-03-10 | End: 2018-03-10

## 2018-03-10 RX ORDER — VANCOMYCIN HCL 1 G
750 VIAL (EA) INTRAVENOUS EVERY 24 HOURS
Qty: 0 | Refills: 0 | Status: DISCONTINUED | OUTPATIENT
Start: 2018-03-11 | End: 2018-03-13

## 2018-03-10 RX ORDER — MEROPENEM 1 G/30ML
1000 INJECTION INTRAVENOUS ONCE
Qty: 0 | Refills: 0 | Status: COMPLETED | OUTPATIENT
Start: 2018-03-10 | End: 2018-03-10

## 2018-03-10 RX ORDER — AMLODIPINE BESYLATE 2.5 MG/1
5 TABLET ORAL DAILY
Qty: 0 | Refills: 0 | Status: DISCONTINUED | OUTPATIENT
Start: 2018-03-10 | End: 2018-03-10

## 2018-03-10 RX ORDER — MORPHINE SULFATE 50 MG/1
2 CAPSULE, EXTENDED RELEASE ORAL EVERY 4 HOURS
Qty: 0 | Refills: 0 | Status: DISCONTINUED | OUTPATIENT
Start: 2018-03-10 | End: 2018-03-10

## 2018-03-10 RX ORDER — MEROPENEM 1 G/30ML
INJECTION INTRAVENOUS
Qty: 0 | Refills: 0 | Status: DISCONTINUED | OUTPATIENT
Start: 2018-03-10 | End: 2018-03-12

## 2018-03-10 RX ORDER — VANCOMYCIN HCL 1 G
VIAL (EA) INTRAVENOUS
Qty: 0 | Refills: 0 | Status: DISCONTINUED | OUTPATIENT
Start: 2018-03-10 | End: 2018-03-13

## 2018-03-10 RX ADMIN — Medication 150 MILLIGRAM(S): at 15:03

## 2018-03-10 RX ADMIN — Medication 3 MILLILITER(S): at 19:55

## 2018-03-10 RX ADMIN — Medication 3 MILLILITER(S): at 14:42

## 2018-03-10 RX ADMIN — PANTOPRAZOLE SODIUM 40 MILLIGRAM(S): 20 TABLET, DELAYED RELEASE ORAL at 06:09

## 2018-03-10 RX ADMIN — Medication 3 MILLILITER(S): at 07:55

## 2018-03-10 RX ADMIN — ONDANSETRON 4 MILLIGRAM(S): 8 TABLET, FILM COATED ORAL at 03:04

## 2018-03-10 RX ADMIN — Medication 650 MILLIGRAM(S): at 03:30

## 2018-03-10 RX ADMIN — Medication 25 MILLIGRAM(S): at 06:09

## 2018-03-10 RX ADMIN — Medication 650 MILLIGRAM(S): at 20:45

## 2018-03-10 RX ADMIN — AMLODIPINE BESYLATE 10 MILLIGRAM(S): 2.5 TABLET ORAL at 06:09

## 2018-03-10 RX ADMIN — MEROPENEM 100 MILLIGRAM(S): 1 INJECTION INTRAVENOUS at 21:26

## 2018-03-10 RX ADMIN — Medication 650 MILLIGRAM(S): at 03:00

## 2018-03-10 RX ADMIN — Medication 600 MILLIGRAM(S): at 19:03

## 2018-03-10 RX ADMIN — Medication 600 MILLIGRAM(S): at 06:09

## 2018-03-10 RX ADMIN — Medication 25 MILLIGRAM(S): at 19:03

## 2018-03-10 RX ADMIN — Medication 600 MILLIGRAM(S): at 11:30

## 2018-03-10 RX ADMIN — MEROPENEM 100 MILLIGRAM(S): 1 INJECTION INTRAVENOUS at 14:27

## 2018-03-10 RX ADMIN — Medication 650 MILLIGRAM(S): at 14:26

## 2018-03-10 RX ADMIN — MONTELUKAST 10 MILLIGRAM(S): 4 TABLET, CHEWABLE ORAL at 11:30

## 2018-03-10 RX ADMIN — Medication 600 MILLIGRAM(S): at 23:56

## 2018-03-10 RX ADMIN — Medication 100.5 MILLIGRAM(S): at 11:29

## 2018-03-10 NOTE — PROGRESS NOTE ADULT - SUBJECTIVE AND OBJECTIVE BOX
OVER NIGHT EVENTS:  Still on high flow (35/50); No events overnight; Bronchoscopy yesterday suspicious for alveolar hemorrhage    REVIEW OF SYSTEMS  See HPI    PHYSICAL EXAM    ICU Vital Signs Last 24 Hrs  T(C): 35.9 (10 Mar 2018 08:00), Max: 36.1 (09 Mar 2018 12:00)  T(F): 96.7 (10 Mar 2018 08:00), Max: 97 (09 Mar 2018 12:00)  HR: 80 (10 Mar 2018 08:00) (70 - 98)  BP: 105/58 (10 Mar 2018 08:00) (98/55 - 119/65)  BP(mean): 83 (10 Mar 2018 08:00) (64 - 91)  RR: 28 (10 Mar 2018 08:00) (22 - 59)  SpO2: 93% (10 Mar 2018 08:00) (90% - 100%)    I&O's Detail    09 Mar 2018 07:01  -  10 Mar 2018 07:00  --------------------------------------------------------  IN:    FFP (Fresh Frozen Plasma): 1229 mL    IV PiggyBack: 550 mL    Oral Fluid: 350 mL  Total IN: 2129 mL    OUT:    Voided: 750 mL  Total OUT: 750 mL    Total NET: 1379 mL    General: Comfortable in bed  HEENT:  On high flow  Lymph node: No palpable LN             Lungs: Bilateral ronchi  Cardiovascular: RRR, S1S2  Abdomen: BS+ve; soft non tender  Extremities: No LE edema  Neurological:  AAOx3; No focal deficit      LABS:                       6.5    9.91  )-----------( 372      ( 10 Mar 2018 04:55 )             20.9                                               03-10    135  |  97<L>  |  45<H>  ----------------------------<  229<H>  4.1   |  26  |  2.1<H>    Ca    8.2<L>      10 Mar 2018 04:55    TPro  5.6<L>  /  Alb  2.9<L>  /  TBili  0.9  /  DBili  x   /  AST  24  /  ALT  25  /  AlkPhos  77  03-10      PT/INR - ( 10 Mar 2018 04:55 )   PT: 35.90 sec;   INR: 3.25 ratio         PTT - ( 10 Mar 2018 04:55 )  PTT:28.2 sec                                              LIVER FUNCTIONS - ( 10 Mar 2018 04:55 )  Alb: 2.9 g/dL / Pro: 5.6 g/dL / ALK PHOS: 77 U/L / ALT: 25 U/L / AST: 24 U/L / GGT: x               Culture - Bronchial (collected 09 Mar 2018 09:30)  Source: Bronch Wash BAL  Gram Stain (09 Mar 2018 23:58):    Few polymorphonuclear leukocytes per low power field    Moderate Squamous epithelial cells per low power field    Few Yeast like cells per oil power field    Moderate Gram Positive Cocci in Clusters per oil power field    Few Gram Negative Diplococci per oil power field    Rare Gram Negative Rods per oil power field      MEDICATIONS  (STANDING):  ALBUTerol    90 MICROgram(s) HFA Inhaler 1 Puff(s) Inhalation every 4 hours  ALBUTerol/ipratropium for Nebulization 3 milliLiter(s) Nebulizer every 6 hours  amLODIPine   Tablet 5 milliGRAM(s) Oral daily  chlorhexidine 4% Liquid 1 Application(s) Topical daily  docusate sodium 100 milliGRAM(s) Oral three times a day  methylPREDNISolone sodium succinate IVPB 250 milliGRAM(s) IV Intermittent every 6 hours  metoprolol     tartrate 25 milliGRAM(s) Oral two times a day  montelukast 10 milliGRAM(s) Oral daily  pantoprazole    Tablet 40 milliGRAM(s) Oral before breakfast  senna 2 Tablet(s) Oral at bedtime  tiotropium 18 MICROgram(s) Capsule 1 Capsule(s) Inhalation daily    MEDICATIONS  (PRN):  acetaminophen   Tablet. 650 milliGRAM(s) Oral every 6 hours PRN Moderate Pain (4 - 6)  morphine  - Injectable 2 milliGRAM(s) IV Push every 4 hours PRN Moderate Pain (4 - 6)  ondansetron Injectable 4 milliGRAM(s) IV Push every 8 hours PRN Nausea and/or Vomiting  polyethylene glycol 3350 17 Gram(s) Oral daily PRN Constipation      Radiology:  < from: Xray Chest 1 View- PORTABLE-Routine (03.10.18 @ 05:02) >  Impression:      Cardiomegaly, bilateral opacities and bilateral calcified pleural   plaques, unchanged.    < end of copied text >

## 2018-03-10 NOTE — PROGRESS NOTE ADULT - SUBJECTIVE AND OBJECTIVE BOX
ELDER, DONI  73y  Female      Patient is a 73y old  Female who presents with a chief complaint of right leg wound, needs debridement (04 Mar 2018 15:38)        REVIEW OF SYSTEMS:  CONSTITUTIONAL: No fever, weight loss, or fatigue  EYES: No eye pain, visual disturbances, or discharge  ENMT:  No difficulty hearing, tinnitus, vertigo; No sinus or throat pain  NECK: No pain or stiffness  BREASTS: No pain, masses, or nipple discharge  RESPIRATORY: No cough, wheezing, chills or hemoptysis; SOB+  CARDIOVASCULAR: No chest pain, palpitations, dizziness, or leg swelling  GASTROINTESTINAL: No abdominal or epigastric pain. No nausea, vomiting, or hematemesis; No diarrhea or constipation. No melena or hematochezia.  GENITOURINARY: No dysuria, frequency, hematuria, or incontinence  NEUROLOGICAL: No headaches, memory loss, loss of strength, numbness, or tremors  SKIN: No itching, burning, rashes, or lesions   LYMPH NODES: No enlarged glands  ENDOCRINE: No heat or cold intolerance; No hair loss  MUSCULOSKELETAL: No joint pain or swelling; No muscle, back, or extremity pain  PSYCHIATRIC: No depression, anxiety, mood swings, or difficulty sleeping  HEME/LYMPH: No easy bruising, or bleeding gums  ALLERY AND IMMUNOLOGIC: No hives or eczema  FAMILY HISTORY:  No pertinent family history in first degree relatives    T(C): 35.9 (03-10-18 @ 08:00), Max: 36.1 (03-09-18 @ 12:00)  HR: 80 (03-10-18 @ 08:00) (70 - 98)  BP: 105/58 (03-10-18 @ 08:00) (98/55 - 119/65)  RR: 28 (03-10-18 @ 08:00) (22 - 59)  SpO2: 93% (03-10-18 @ 08:00) (90% - 100%)  Wt(kg): --Vital Signs Last 24 Hrs  T(C): 35.9 (10 Mar 2018 08:00), Max: 36.1 (09 Mar 2018 12:00)  T(F): 96.7 (10 Mar 2018 08:00), Max: 97 (09 Mar 2018 12:00)  HR: 80 (10 Mar 2018 08:00) (70 - 98)  BP: 105/58 (10 Mar 2018 08:00) (98/55 - 119/65)  BP(mean): 83 (10 Mar 2018 08:00) (64 - 91)  RR: 28 (10 Mar 2018 08:00) (22 - 59)  SpO2: 93% (10 Mar 2018 08:00) (90% - 100%)  No Known Allergies      PHYSICAL EXAM:  GENERAL: NAD, well-groomed, well-developed  HEAD:  Atraumatic, Normocephalic  EYES: EOMI, PERRLA, conjunctiva and sclera clear  ENMT: No tonsillar erythema, exudates, or enlargement; Moist mucous membranes, Good dentition, No lesions  NECK: Supple, No JVD, Normal thyroid  NERVOUS SYSTEM:  Alert & Oriented X3, Good concentration; Motor Strength 5/5 B/L upper and lower extremities; DTRs 2+ intact and symmetric  CHEST/LUNG: Clear to percussion bilaterally; No rales  HEART: Regular rate and rhythm; No murmurs, rubs, or gallops  ABDOMEN: Soft, Nontender, Nondistended; Bowel sounds present  EXTREMITIES:  2+ Peripheral Pulses, No clubbing, cyanosis, or edema  LYMPH: No lymphadenopathy noted  SKIN: No rashes or lesions      LABS:  03-10    135  |  97<L>  |  45<H>  ----------------------------<  229<H>  4.1   |  26  |  2.1<H>    Ca    8.2<L>      10 Mar 2018 04:55    TPro  5.6<L>  /  Alb  2.9<L>  /  TBili  0.9  /  DBili  x   /  AST  24  /  ALT  25  /  AlkPhos  77  03-10                          6.5    9.91  )-----------( 372      ( 10 Mar 2018 04:55 )             20.9         RADIOLOGY & ADDITIONAL TESTS:    MEDICATION:  acetaminophen   Tablet. 650 milliGRAM(s) Oral every 6 hours PRN  ALBUTerol    90 MICROgram(s) HFA Inhaler 1 Puff(s) Inhalation every 4 hours  ALBUTerol/ipratropium for Nebulization 3 milliLiter(s) Nebulizer every 6 hours  amLODIPine   Tablet 10 milliGRAM(s) Oral every 24 hours  chlorhexidine 4% Liquid 1 Application(s) Topical daily  docusate sodium 100 milliGRAM(s) Oral three times a day  methylPREDNISolone sodium succinate IVPB 250 milliGRAM(s) IV Intermittent every 6 hours  metoprolol     tartrate 25 milliGRAM(s) Oral two times a day  montelukast 10 milliGRAM(s) Oral daily  morphine  - Injectable 4 milliGRAM(s) IV Push four times a day PRN  ondansetron Injectable 4 milliGRAM(s) IV Push every 8 hours PRN  pantoprazole    Tablet 40 milliGRAM(s) Oral before breakfast  polyethylene glycol 3350 17 Gram(s) Oral daily PRN  senna 2 Tablet(s) Oral at bedtime  tiotropium 18 MICROgram(s) Capsule 1 Capsule(s) Inhalation daily      HEALTH ISSUES - PROBLEM Dx:    HEALTH ISSUES - PROBLEM Dx:    s/p rt leg wound debridement  Acute Respirtory distress on cpap  hx dvt ,no PE now,hx chronic autoimmune hepatitis on prednisone,prograf  hx COPD on duoneb  HTn will cut down amlodipine  anemia need 2 units PRBC,case d/w house staff

## 2018-03-10 NOTE — PROGRESS NOTE ADULT - ASSESSMENT
IMPRESSION:  AHRF Inflammatory vs infectious infiltrate?  HO PCP pneumonia   HO autoimmune hepatitis   REJI    PLAN:    CNS: no depressants    HEENT: Oral care    PULMONARY:  HOB @ 45 degrees.  Wean off O2 flow ; High dose solumedrol for 2 days    CARDIOVASCULAR:  I = O; Hold lasix for today    GI: GI prophylaxis.  Feeding  as tolerated; Will resume prograf once solumedrol de-escalated; FU LFTs    RENAL:  Follow up lytes.  Correct as needed    INFECTIOUS DISEASE: Follow up cultures.  D/C bactrim as PCP stain negative    HEMATOLOGICAL:  DVT prophylaxis SCD; Reverse INR; PRBC x1; H/H FU    ENDOCRINE:  Follow up FS.  Insulin protocol if needed.    ICU monitoring IMPRESSION:  AHRF Inflammatory vs infectious infiltrate?  HO PCP pneumonia   HO autoimmune hepatitis   REJI    PLAN:    CNS: no depressants    HEENT: Oral care    PULMONARY:  HOB @ 45 degrees.  Wean off O2 flow ; High dose solumedrol for 2 days; Rheumatological screen;    CARDIOVASCULAR:  I = O; Hold lasix for today    GI: GI prophylaxis.  Feeding  as tolerated; Will resume prograf once solumedrol de-escalated; FU LFTs    RENAL:  Follow up lytes.  Correct as needed    INFECTIOUS DISEASE: Follow up cultures.  D/C bactrim as PCP stain negative    HEMATOLOGICAL:  DVT prophylaxis SCD; Reverse INR; PRBC x1; H/H FU; LE duplex    ENDOCRINE:  Follow up FS.  Insulin protocol if needed.    ICU monitoring IMPRESSION:  AHRF Inflammatory vs infectious infiltrate?  HO PCP pneumonia   HO autoimmune hepatitis   REJI    PLAN:    CNS: no depressants    HEENT: Oral care    PULMONARY:  HOB @ 45 degrees.  Wean off O2 flow ; High dose solumedrol for 2 days; Rheumatological screen;    CARDIOVASCULAR:  I = O; Hold lasix for today    GI: GI prophylaxis.  Feeding  as tolerated; Will resume prograf once solumedrol de-escalated; FU LFTs    RENAL:  Follow up lytes.  Correct as needed    INFECTIOUS DISEASE: Follow up cultures.  D/C bactrim as PCP stain negative, cover GPC in sputum    HEMATOLOGICAL:  DVT prophylaxis SCD; Reverse INR; PRBC x1; H/H FU; LE duplex, r/o hemolytic anemia    ENDOCRINE:  Follow up FS.  Insulin protocol if needed.    ICU monitoring

## 2018-03-11 LAB
-  AMPICILLIN/SULBACTAM: SIGNIFICANT CHANGE UP
-  CEFAZOLIN: SIGNIFICANT CHANGE UP
-  CIPROFLOXACIN: SIGNIFICANT CHANGE UP
-  CLINDAMYCIN: SIGNIFICANT CHANGE UP
-  ERYTHROMYCIN: SIGNIFICANT CHANGE UP
-  GENTAMICIN: SIGNIFICANT CHANGE UP
-  LEVOFLOXACIN: SIGNIFICANT CHANGE UP
-  LINEZOLID: SIGNIFICANT CHANGE UP
-  MOXIFLOXACIN(AEROBIC): SIGNIFICANT CHANGE UP
-  OXACILLIN: SIGNIFICANT CHANGE UP
-  PENICILLIN: SIGNIFICANT CHANGE UP
-  RIFAMPIN: SIGNIFICANT CHANGE UP
-  TETRACYCLINE: SIGNIFICANT CHANGE UP
-  TRIMETHOPRIM/SULFAMETHOXAZOLE: SIGNIFICANT CHANGE UP
-  VANCOMYCIN: SIGNIFICANT CHANGE UP
ALBUMIN SERPL ELPH-MCNC: 3 G/DL — SIGNIFICANT CHANGE UP (ref 3–5.5)
ALP SERPL-CCNC: 91 U/L — SIGNIFICANT CHANGE UP (ref 30–115)
ALT FLD-CCNC: 36 U/L — SIGNIFICANT CHANGE UP (ref 0–41)
ANION GAP SERPL CALC-SCNC: 10 MMOL/L — SIGNIFICANT CHANGE UP (ref 7–14)
APTT BLD: 23.9 SEC — CRITICAL LOW (ref 27–39.2)
AST SERPL-CCNC: 30 U/L — SIGNIFICANT CHANGE UP (ref 0–41)
BASOPHILS # BLD AUTO: 0.01 K/UL — SIGNIFICANT CHANGE UP (ref 0–0.2)
BASOPHILS NFR BLD AUTO: 0.1 % — SIGNIFICANT CHANGE UP (ref 0–1)
BILIRUB DIRECT SERPL-MCNC: 0.8 MG/DL — HIGH (ref 0–0.2)
BILIRUB INDIRECT FLD-MCNC: 0.9 MG/DL — SIGNIFICANT CHANGE UP
BILIRUB SERPL-MCNC: 1.7 MG/DL — HIGH (ref 0.2–1.2)
BUN SERPL-MCNC: 39 MG/DL — HIGH (ref 10–20)
CALCIUM SERPL-MCNC: 8.7 MG/DL — SIGNIFICANT CHANGE UP (ref 8.5–10.1)
CHLORIDE SERPL-SCNC: 97 MMOL/L — LOW (ref 98–110)
CO2 SERPL-SCNC: 29 MMOL/L — SIGNIFICANT CHANGE UP (ref 17–32)
CREAT SERPL-MCNC: 1.8 MG/DL — HIGH (ref 0.7–1.5)
CULTURE RESULTS: SIGNIFICANT CHANGE UP
EOSINOPHIL # BLD AUTO: 0 K/UL — SIGNIFICANT CHANGE UP (ref 0–0.7)
EOSINOPHIL NFR BLD AUTO: 0 % — SIGNIFICANT CHANGE UP (ref 0–8)
GLUCOSE SERPL-MCNC: 249 MG/DL — HIGH (ref 70–110)
HCT VFR BLD CALC: 22.9 % — LOW (ref 37–47)
HCT VFR BLD CALC: 25.5 % — LOW (ref 37–47)
HGB BLD-MCNC: 7.3 G/DL — CRITICAL LOW (ref 12–16)
HGB BLD-MCNC: 8 G/DL — LOW (ref 12–16)
IMM GRANULOCYTES NFR BLD AUTO: 1.4 % — HIGH (ref 0.1–0.3)
INR BLD: 1.53 RATIO — HIGH (ref 0.65–1.3)
LYMPHOCYTES # BLD AUTO: 0.28 K/UL — LOW (ref 1.2–3.4)
LYMPHOCYTES # BLD AUTO: 3.3 % — LOW (ref 20.5–51.1)
MCHC RBC-ENTMCNC: 23 PG — LOW (ref 27–31)
MCHC RBC-ENTMCNC: 23.1 PG — LOW (ref 27–31)
MCHC RBC-ENTMCNC: 31.4 G/DL — LOW (ref 32–37)
MCHC RBC-ENTMCNC: 31.9 G/DL — LOW (ref 32–37)
MCV RBC AUTO: 72.2 FL — LOW (ref 81–99)
MCV RBC AUTO: 73.5 FL — LOW (ref 81–99)
METHOD TYPE: SIGNIFICANT CHANGE UP
MONOCYTES # BLD AUTO: 0.29 K/UL — SIGNIFICANT CHANGE UP (ref 0.1–0.6)
MONOCYTES NFR BLD AUTO: 3.4 % — SIGNIFICANT CHANGE UP (ref 1.7–9.3)
NEUTROPHILS # BLD AUTO: 7.8 K/UL — HIGH (ref 1.4–6.5)
NEUTROPHILS NFR BLD AUTO: 91.8 % — HIGH (ref 42.2–75.2)
NRBC # BLD: 0 /100 WBCS — SIGNIFICANT CHANGE UP (ref 0–0)
ORGANISM # SPEC MICROSCOPIC CNT: SIGNIFICANT CHANGE UP
ORGANISM # SPEC MICROSCOPIC CNT: SIGNIFICANT CHANGE UP
PLATELET # BLD AUTO: 341 K/UL — SIGNIFICANT CHANGE UP (ref 130–400)
PLATELET # BLD AUTO: 377 K/UL — SIGNIFICANT CHANGE UP (ref 130–400)
POTASSIUM SERPL-MCNC: 4.1 MMOL/L — SIGNIFICANT CHANGE UP (ref 3.5–5)
POTASSIUM SERPL-SCNC: 4.1 MMOL/L — SIGNIFICANT CHANGE UP (ref 3.5–5)
PROT SERPL-MCNC: 5.7 G/DL — LOW (ref 6–8)
PROTHROM AB SERPL-ACNC: 16.7 SEC — HIGH (ref 9.95–12.87)
RBC # BLD: 3.17 M/UL — LOW (ref 4.2–5.4)
RBC # BLD: 3.47 M/UL — LOW (ref 4.2–5.4)
RBC # FLD: 18 % — HIGH (ref 11.5–14.5)
RBC # FLD: 18.3 % — HIGH (ref 11.5–14.5)
SODIUM SERPL-SCNC: 136 MMOL/L — SIGNIFICANT CHANGE UP (ref 135–146)
SPECIMEN SOURCE: SIGNIFICANT CHANGE UP
WBC # BLD: 11.98 K/UL — HIGH (ref 4.8–10.8)
WBC # BLD: 8.5 K/UL — SIGNIFICANT CHANGE UP (ref 4.8–10.8)
WBC # FLD AUTO: 11.98 K/UL — HIGH (ref 4.8–10.8)
WBC # FLD AUTO: 8.5 K/UL — SIGNIFICANT CHANGE UP (ref 4.8–10.8)

## 2018-03-11 RX ORDER — SODIUM CHLORIDE 9 MG/ML
1000 INJECTION, SOLUTION INTRAVENOUS
Qty: 0 | Refills: 0 | Status: DISCONTINUED | OUTPATIENT
Start: 2018-03-11 | End: 2018-03-11

## 2018-03-11 RX ORDER — INSULIN LISPRO 100/ML
5 VIAL (ML) SUBCUTANEOUS
Qty: 0 | Refills: 0 | Status: DISCONTINUED | OUTPATIENT
Start: 2018-03-11 | End: 2018-03-23

## 2018-03-11 RX ORDER — DEXTROSE 50 % IN WATER 50 %
25 SYRINGE (ML) INTRAVENOUS ONCE
Qty: 0 | Refills: 0 | Status: DISCONTINUED | OUTPATIENT
Start: 2018-03-11 | End: 2018-03-23

## 2018-03-11 RX ORDER — DEXTROSE 50 % IN WATER 50 %
12.5 SYRINGE (ML) INTRAVENOUS ONCE
Qty: 0 | Refills: 0 | Status: DISCONTINUED | OUTPATIENT
Start: 2018-03-11 | End: 2018-03-23

## 2018-03-11 RX ORDER — FUROSEMIDE 40 MG
40 TABLET ORAL ONCE
Qty: 0 | Refills: 0 | Status: COMPLETED | OUTPATIENT
Start: 2018-03-11 | End: 2018-03-11

## 2018-03-11 RX ORDER — GLUCAGON INJECTION, SOLUTION 0.5 MG/.1ML
1 INJECTION, SOLUTION SUBCUTANEOUS ONCE
Qty: 0 | Refills: 0 | Status: DISCONTINUED | OUTPATIENT
Start: 2018-03-11 | End: 2018-03-23

## 2018-03-11 RX ORDER — DEXTROSE 50 % IN WATER 50 %
1 SYRINGE (ML) INTRAVENOUS ONCE
Qty: 0 | Refills: 0 | Status: DISCONTINUED | OUTPATIENT
Start: 2018-03-11 | End: 2018-03-23

## 2018-03-11 RX ADMIN — MEROPENEM 100 MILLIGRAM(S): 1 INJECTION INTRAVENOUS at 05:43

## 2018-03-11 RX ADMIN — Medication 3 MILLILITER(S): at 20:33

## 2018-03-11 RX ADMIN — Medication 3 MILLILITER(S): at 13:43

## 2018-03-11 RX ADMIN — Medication 600 MILLIGRAM(S): at 18:06

## 2018-03-11 RX ADMIN — Medication 650 MILLIGRAM(S): at 18:28

## 2018-03-11 RX ADMIN — MEROPENEM 100 MILLIGRAM(S): 1 INJECTION INTRAVENOUS at 13:38

## 2018-03-11 RX ADMIN — MONTELUKAST 10 MILLIGRAM(S): 4 TABLET, CHEWABLE ORAL at 12:12

## 2018-03-11 RX ADMIN — CHLORHEXIDINE GLUCONATE 1 APPLICATION(S): 213 SOLUTION TOPICAL at 05:43

## 2018-03-11 RX ADMIN — Medication 650 MILLIGRAM(S): at 18:30

## 2018-03-11 RX ADMIN — Medication 5 UNIT(S): at 11:32

## 2018-03-11 RX ADMIN — Medication 25 MILLIGRAM(S): at 18:06

## 2018-03-11 RX ADMIN — MEROPENEM 100 MILLIGRAM(S): 1 INJECTION INTRAVENOUS at 21:39

## 2018-03-11 RX ADMIN — CHLORHEXIDINE GLUCONATE 1 APPLICATION(S): 213 SOLUTION TOPICAL at 12:13

## 2018-03-11 RX ADMIN — Medication 5 UNIT(S): at 17:18

## 2018-03-11 RX ADMIN — PANTOPRAZOLE SODIUM 40 MILLIGRAM(S): 20 TABLET, DELAYED RELEASE ORAL at 07:36

## 2018-03-11 RX ADMIN — Medication 600 MILLIGRAM(S): at 12:12

## 2018-03-11 RX ADMIN — Medication 25 MILLIGRAM(S): at 05:43

## 2018-03-11 RX ADMIN — Medication 150 MILLIGRAM(S): at 13:38

## 2018-03-11 RX ADMIN — Medication 3 MILLILITER(S): at 08:12

## 2018-03-11 RX ADMIN — Medication 40 MILLIGRAM(S): at 14:09

## 2018-03-11 RX ADMIN — Medication 600 MILLIGRAM(S): at 05:43

## 2018-03-11 NOTE — PROGRESS NOTE ADULT - ASSESSMENT
IMPRESSION:  AHRF Inflammatory vs infectious infiltrate?  HO PCP pneumonia   HO autoimmune hepatitis   Prothrombin gene mutation - DVT last year   Acute on chronic anemia   REJI    PLAN:    CNS: no depressants    HEENT: Oral care    PULMONARY:  HOB @ 45 degrees.  Wean off O2 flow ; High dose solumedrol for 2 days; Rheumatological screen;    CARDIOVASCULAR:  I = O; Hold lasix for today    GI: GI prophylaxis.  Feeding  as tolerated; Will resume prograf once solumedrol de-escalated; FU LFTs    RENAL:  Follow up lytes.  Correct as needed    INFECTIOUS DISEASE: Follow up cultures.  Cont Vanc Merrem     HEMATOLOGICAL:  DVT prophylaxis SCD; Reverse INR; PRBC x1; H/H FU; LE duplex, r/o hemolytic anemia. Kirk.     ENDOCRINE:  Follow up FS.  Insulin protocol if needed.    ICU monitoring IMPRESSION:  AHRF Inflammatory vs infectious infiltrate?  HO PCP pneumonia   HO autoimmune hepatitis   Prothrombin gene mutation - DVT last year   Acute on chronic anemia   REJI    PLAN:    CNS: no depressants    HEENT: Oral care    PULMONARY:  HOB @ 45 degrees.  Wean off O2 flow ; High dose solumedrol for 2 days; Rheumatological screen;    CARDIOVASCULAR:  I = O; lasix x 1 for today    GI: GI prophylaxis.  Feeding  as tolerated; Will resume prograf once solumedrol de-escalated; FU LFTs    RENAL:  Follow up lytes.  Correct as needed    INFECTIOUS DISEASE: Follow up cultures.  Cont Vanc Merrem     HEMATOLOGICAL:  DVT prophylaxis SCD; Reverse INR; PRBC x1; H/H FU; LE duplex, r/o hemolytic anemia. Kirk.     ENDOCRINE:  Follow up FS.  Insulin protocol if needed.    ICU monitoring

## 2018-03-11 NOTE — PROGRESS NOTE ADULT - SUBJECTIVE AND OBJECTIVE BOX
ELDER, DONI  73y  Female      Patient is a 73y old  Female who presents with a chief complaint of right leg wound, needs debridement (04 Mar 2018 15:38)        REVIEW OF SYSTEMS:  CONSTITUTIONAL: No fever, fatigue+  EYES: No eye pain, visual disturbances, or discharge  ENMT:  No difficulty hearing, tinnitus, vertigo; No sinus or throat pain  NECK: No pain or stiffness  BREASTS: No pain, masses, or nipple discharge  RESPIRATORY: SOB+  CARDIOVASCULAR: No chest pain, palpitations, dizziness, or leg swelling  GASTROINTESTINAL: No abdominal or epigastric pain. No nausea, vomiting, or hematemesis; No diarrhea or constipation. No melena or hematochezia.  GENITOURINARY: No dysuria, frequency, hematuria, or incontinence  NEUROLOGICAL: No headaches, memory loss, loss of strength, numbness, or tremors  SKIN: No itching, burning, rashes, or lesions   LYMPH NODES: No enlarged glands  ENDOCRINE: No heat or cold intolerance; No hair loss  MUSCULOSKELETAL: No joint pain or swelling; No muscle, back, or extremity pain  PSYCHIATRIC: No depression, anxiety, mood swings, or difficulty sleeping  HEME/LYMPH: No easy bruising, or bleeding gums  ALLERY AND IMMUNOLOGIC: No hives or eczema  FAMILY HISTORY:  No pertinent family history in first degree relatives    T(C): 36.5 (03-11-18 @ 08:00), Max: 36.9 (03-11-18 @ 00:00)  HR: 70 (03-11-18 @ 08:00) (68 - 104)  BP: 132/70 (03-11-18 @ 08:00) (111/57 - 139/68)  RR: 36 (03-11-18 @ 08:00) (24 - 56)  SpO2: 94% (03-11-18 @ 09:12) (82% - 98%)  Wt(kg): --Vital Signs Last 24 Hrs  T(C): 36.5 (11 Mar 2018 08:00), Max: 36.9 (11 Mar 2018 00:00)  T(F): 97.7 (11 Mar 2018 08:00), Max: 98.4 (11 Mar 2018 00:00)  HR: 70 (11 Mar 2018 08:00) (68 - 104)  BP: 132/70 (11 Mar 2018 08:00) (111/57 - 139/68)  BP(mean): 100 (11 Mar 2018 06:00) (72 - 102)  RR: 36 (11 Mar 2018 08:00) (24 - 56)  SpO2: 94% (11 Mar 2018 09:12) (82% - 98%)  No Known Allergies      PHYSICAL EXAM:  GENERAL: NAD, well-groomed, well-developed  HEAD:  Atraumatic, Normocephalic  EYES: EOMI, PERRLA, conjunctiva and sclera clear  ENMT: No tonsillar erythema, exudates, or enlargement; Moist mucous membranes, Good dentition, No lesions  NECK: Supple, No JVD, Normal thyroid  NERVOUS SYSTEM:  Alert & Oriented X3, Good concentration; Motor Strength 5/5 B/L upper and lower extremities; DTRs 2+ intact and symmetric  CHEST/LUNG: VBS BILATERAL RALES+  HEART: Regular rate and rhythm; No murmurs, rubs, or gallops  ABDOMEN: Soft, Nontender, Nondistended; Bowel sounds present  EXTREMITIES:  2+ Peripheral Pulses, No clubbing, cyanosis, or edema  LYMPH: No lymphadenopathy noted  SKIN: No rashes or lesions      LABS:  03-11    136  |  97<L>  |  39<H>  ----------------------------<  249<H>  4.1   |  29  |  1.8<H>    Ca    8.7      11 Mar 2018 04:51    TPro  5.7<L>  /  Alb  3.0  /  TBili  1.7<H>  /  DBili  0.8<H>  /  AST  30  /  ALT  36  /  AlkPhos  91  03-11                        7.3    8.50  )-----------( 341      ( 11 Mar 2018 04:51 )             22.9           RADIOLOGY & ADDITIONAL TESTS:    MEDICATION:  acetaminophen   Tablet. 650 milliGRAM(s) Oral every 6 hours PRN  ALBUTerol    90 MICROgram(s) HFA Inhaler 1 Puff(s) Inhalation every 4 hours  ALBUTerol/ipratropium for Nebulization 3 milliLiter(s) Nebulizer every 6 hours  chlorhexidine 4% Liquid 1 Application(s) Topical daily  dextrose 5%. 1000 milliLiter(s) IV Continuous <Continuous>  dextrose 50% Injectable 12.5 Gram(s) IV Push once  dextrose 50% Injectable 25 Gram(s) IV Push once  dextrose 50% Injectable 25 Gram(s) IV Push once  dextrose Gel 1 Dose(s) Oral once PRN  docusate sodium 100 milliGRAM(s) Oral three times a day  glucagon  Injectable 1 milliGRAM(s) IntraMuscular once PRN  insulin lispro Injectable (HumaLOG) 5 Unit(s) SubCutaneous before breakfast  insulin lispro Injectable (HumaLOG) 5 Unit(s) SubCutaneous before lunch  insulin lispro Injectable (HumaLOG) 5 Unit(s) SubCutaneous before dinner  meropenem  IVPB 1000 milliGRAM(s) IV Intermittent every 8 hours  meropenem  IVPB      methylPREDNISolone sodium succinate IVPB 250 milliGRAM(s) IV Intermittent every 6 hours  metoprolol     tartrate 25 milliGRAM(s) Oral two times a day  montelukast 10 milliGRAM(s) Oral daily  morphine  - Injectable 2 milliGRAM(s) IV Push every 4 hours PRN  ondansetron Injectable 4 milliGRAM(s) IV Push every 8 hours PRN  pantoprazole    Tablet 40 milliGRAM(s) Oral before breakfast  polyethylene glycol 3350 17 Gram(s) Oral daily PRN  senna 2 Tablet(s) Oral at bedtime  tiotropium 18 MICROgram(s) Capsule 1 Capsule(s) Inhalation daily  vancomycin  IVPB      vancomycin  IVPB 750 milliGRAM(s) IV Intermittent every 24 hours      HEALTH ISSUES - PROBLEM Dx:  RESPIRATORY DISTRESS ON CPAP ,VANCOMYCIN,MEREPENAM  ANEMIA S/P PRBC  CHF NEED LASIX  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

## 2018-03-12 LAB
ALBUMIN SERPL ELPH-MCNC: 2.9 G/DL — LOW (ref 3–5.5)
ALP SERPL-CCNC: 112 U/L — SIGNIFICANT CHANGE UP (ref 30–115)
ALT FLD-CCNC: 54 U/L — HIGH (ref 0–41)
ANION GAP SERPL CALC-SCNC: 9 MMOL/L — SIGNIFICANT CHANGE UP (ref 7–14)
APTT BLD: 22.7 SEC — CRITICAL LOW (ref 27–39.2)
AST SERPL-CCNC: 47 U/L — HIGH (ref 0–41)
BASOPHILS # BLD AUTO: 0 K/UL — SIGNIFICANT CHANGE UP (ref 0–0.2)
BASOPHILS NFR BLD AUTO: 0 % — SIGNIFICANT CHANGE UP (ref 0–1)
BILIRUB DIRECT SERPL-MCNC: 0.9 MG/DL — HIGH (ref 0–0.2)
BILIRUB INDIRECT FLD-MCNC: 1 MG/DL — SIGNIFICANT CHANGE UP
BILIRUB SERPL-MCNC: 1.9 MG/DL — HIGH (ref 0.2–1.2)
BUN SERPL-MCNC: 33 MG/DL — HIGH (ref 10–20)
CALCIUM SERPL-MCNC: 9 MG/DL — SIGNIFICANT CHANGE UP (ref 8.5–10.1)
CHLORIDE SERPL-SCNC: 100 MMOL/L — SIGNIFICANT CHANGE UP (ref 98–110)
CO2 SERPL-SCNC: 30 MMOL/L — SIGNIFICANT CHANGE UP (ref 17–32)
CREAT SERPL-MCNC: 1.6 MG/DL — HIGH (ref 0.7–1.5)
EOSINOPHIL # BLD AUTO: 0 K/UL — SIGNIFICANT CHANGE UP (ref 0–0.7)
EOSINOPHIL NFR BLD AUTO: 0 % — SIGNIFICANT CHANGE UP (ref 0–8)
ESTIMATED AVERAGE GLUCOSE: 151 MG/DL — HIGH (ref 68–114)
FUNGITELL: 33 PG/ML — SIGNIFICANT CHANGE UP (ref 0–59)
GLUCOSE SERPL-MCNC: 234 MG/DL — HIGH (ref 70–110)
HAPTOGLOB SERPL-MCNC: 279 MG/DL — HIGH (ref 34–200)
HBA1C BLD-MCNC: 6.9 % — HIGH (ref 4–5.6)
HCT VFR BLD CALC: 24.1 % — LOW (ref 37–47)
HGB BLD-MCNC: 7.6 G/DL — LOW (ref 12–16)
IMM GRANULOCYTES NFR BLD AUTO: 2 % — HIGH (ref 0.1–0.3)
INR BLD: 1.3 RATIO — SIGNIFICANT CHANGE UP (ref 0.65–1.3)
LYMPHOCYTES # BLD AUTO: 0.24 K/UL — LOW (ref 1.2–3.4)
LYMPHOCYTES # BLD AUTO: 2.5 % — LOW (ref 20.5–51.1)
MCHC RBC-ENTMCNC: 23.2 PG — LOW (ref 27–31)
MCHC RBC-ENTMCNC: 31.5 G/DL — LOW (ref 32–37)
MCV RBC AUTO: 73.7 FL — LOW (ref 81–99)
MONOCYTES # BLD AUTO: 0.29 K/UL — SIGNIFICANT CHANGE UP (ref 0.1–0.6)
MONOCYTES NFR BLD AUTO: 3 % — SIGNIFICANT CHANGE UP (ref 1.7–9.3)
NEUTROPHILS # BLD AUTO: 8.82 K/UL — HIGH (ref 1.4–6.5)
NEUTROPHILS NFR BLD AUTO: 92.5 % — HIGH (ref 42.2–75.2)
PLATELET # BLD AUTO: 387 K/UL — SIGNIFICANT CHANGE UP (ref 130–400)
POTASSIUM SERPL-MCNC: 4.4 MMOL/L — SIGNIFICANT CHANGE UP (ref 3.5–5)
POTASSIUM SERPL-SCNC: 4.4 MMOL/L — SIGNIFICANT CHANGE UP (ref 3.5–5)
PROT SERPL-MCNC: 5.5 G/DL — LOW (ref 6–8)
PROTHROM AB SERPL-ACNC: 14.1 SEC — HIGH (ref 9.95–12.87)
RBC # BLD: 3.27 M/UL — LOW (ref 4.2–5.4)
RBC # FLD: 18 % — HIGH (ref 11.5–14.5)
SODIUM SERPL-SCNC: 139 MMOL/L — SIGNIFICANT CHANGE UP (ref 135–146)
VANCOMYCIN TROUGH SERPL-MCNC: 10.2 UG/ML — HIGH (ref 5–10)
VANCOMYCIN TROUGH SERPL-MCNC: 18.6 UG/ML — HIGH (ref 5–10)
WBC # BLD: 9.54 K/UL — SIGNIFICANT CHANGE UP (ref 4.8–10.8)
WBC # FLD AUTO: 9.54 K/UL — SIGNIFICANT CHANGE UP (ref 4.8–10.8)

## 2018-03-12 RX ORDER — TACROLIMUS 5 MG/1
0.5 CAPSULE ORAL AT BEDTIME
Qty: 0 | Refills: 0 | Status: DISCONTINUED | OUTPATIENT
Start: 2018-03-12 | End: 2018-03-23

## 2018-03-12 RX ADMIN — Medication 5 UNIT(S): at 06:10

## 2018-03-12 RX ADMIN — Medication 600 MILLIGRAM(S): at 05:27

## 2018-03-12 RX ADMIN — Medication 650 MILLIGRAM(S): at 21:15

## 2018-03-12 RX ADMIN — MEROPENEM 100 MILLIGRAM(S): 1 INJECTION INTRAVENOUS at 05:26

## 2018-03-12 RX ADMIN — TACROLIMUS 0.5 MILLIGRAM(S): 5 CAPSULE ORAL at 21:15

## 2018-03-12 RX ADMIN — Medication 25 MILLIGRAM(S): at 18:51

## 2018-03-12 RX ADMIN — Medication 80 MILLIGRAM(S): at 18:51

## 2018-03-12 RX ADMIN — Medication 5 UNIT(S): at 17:20

## 2018-03-12 RX ADMIN — Medication 5 UNIT(S): at 11:07

## 2018-03-12 RX ADMIN — Medication 80 MILLIGRAM(S): at 13:06

## 2018-03-12 RX ADMIN — MONTELUKAST 10 MILLIGRAM(S): 4 TABLET, CHEWABLE ORAL at 11:07

## 2018-03-12 RX ADMIN — Medication 650 MILLIGRAM(S): at 00:52

## 2018-03-12 RX ADMIN — CHLORHEXIDINE GLUCONATE 1 APPLICATION(S): 213 SOLUTION TOPICAL at 11:08

## 2018-03-12 RX ADMIN — Medication 600 MILLIGRAM(S): at 00:16

## 2018-03-12 RX ADMIN — Medication 3 MILLILITER(S): at 19:53

## 2018-03-12 RX ADMIN — Medication 650 MILLIGRAM(S): at 22:15

## 2018-03-12 RX ADMIN — Medication 150 MILLIGRAM(S): at 13:05

## 2018-03-12 RX ADMIN — PANTOPRAZOLE SODIUM 40 MILLIGRAM(S): 20 TABLET, DELAYED RELEASE ORAL at 06:11

## 2018-03-12 RX ADMIN — Medication 650 MILLIGRAM(S): at 00:22

## 2018-03-12 RX ADMIN — Medication 650 MILLIGRAM(S): at 06:19

## 2018-03-12 RX ADMIN — MEROPENEM 100 MILLIGRAM(S): 1 INJECTION INTRAVENOUS at 13:04

## 2018-03-12 RX ADMIN — Medication 3 MILLILITER(S): at 14:03

## 2018-03-12 RX ADMIN — Medication 25 MILLIGRAM(S): at 05:25

## 2018-03-12 RX ADMIN — Medication 80 MILLIGRAM(S): at 23:17

## 2018-03-12 NOTE — PROGRESS NOTE ADULT - SUBJECTIVE AND OBJECTIVE BOX
OVER NIGHT EVENTS:  No events overnight; Still on high flow 40/40    PHYSICAL EXAM    ICU Vital Signs Last 24 Hrs  T(C): 36.1 (12 Mar 2018 08:00), Max: 36.7 (12 Mar 2018 00:00)  T(F): 97 (12 Mar 2018 08:00), Max: 98 (12 Mar 2018 00:00)  HR: 86 (12 Mar 2018 09:00) (64 - 104)  BP: 130/54 (12 Mar 2018 09:00) (113/60 - 157/86)  BP(mean): 83 (12 Mar 2018 09:00) (80 - 135)  RR: 42 (12 Mar 2018 09:00) (18 - 52)  SpO2: 98% (12 Mar 2018 09:00) (87% - 100%)    I&O's Detail    11 Mar 2018 07:01  -  12 Mar 2018 07:00  --------------------------------------------------------  IN:    IV PiggyBack: 500 mL    Oral Fluid: 1025 mL  Total IN: 1525 mL    OUT:    Voided: 1325 mL  Total OUT: 1325 mL    Total NET: 200 mL    General: Comfortable in bed  HEENT:  On high flow           Lungs: Bilateral ronchi  Cardiovascular: RRR, S1S2  Abdomen: BS+ve; soft non tender  Extremities: No LE edema  Neurological:  AAOx3; No focal deficit      LABS:                          7.6  (8.0)  9.54  )-----------( 387      ( 12 Mar 2018 04:54 )             24.1                                               03-12    139  |  100  |  33<H>  ----------------------------<  234<H>  4.4   |  30  |  1.6<H>    Ca    9.0      12 Mar 2018 04:54    TPro  5.5<L>  /  Alb  2.9<L>  /  TBili  1.9<H>  /  DBili  0.9<H>  /  AST  47<H>  /  ALT  54<H>  /  AlkPhos  112  03-12      PT/INR - ( 12 Mar 2018 04:54 )   PT: 14.10 sec;   INR: 1.30 ratio         PTT - ( 12 Mar 2018 04:54 )  PTT:22.7 sec                                            LIVER FUNCTIONS - ( 12 Mar 2018 04:54 )  Alb: 2.9 g/dL / Pro: 5.5 g/dL / ALK PHOS: 112 U/L / ALT: 54 U/L / AST: 47 U/L / GGT: x                 MEDICATIONS  (STANDING):  ALBUTerol    90 MICROgram(s) HFA Inhaler 1 Puff(s) Inhalation every 4 hours  ALBUTerol/ipratropium for Nebulization 3 milliLiter(s) Nebulizer every 6 hours  chlorhexidine 4% Liquid 1 Application(s) Topical daily  docusate sodium 100 milliGRAM(s) Oral three times a day  insulin lispro Injectable (HumaLOG) 5 Unit(s) SubCutaneous before breakfast  insulin lispro Injectable (HumaLOG) 5 Unit(s) SubCutaneous before lunch  insulin lispro Injectable (HumaLOG) 5 Unit(s) SubCutaneous before dinner  meropenem  IVPB 1000 milliGRAM(s) IV Intermittent every 8 hours  meropenem  IVPB      methylPREDNISolone sodium succinate IVPB 250 milliGRAM(s) IV Intermittent every 6 hours  metoprolol     tartrate 25 milliGRAM(s) Oral two times a day  montelukast 10 milliGRAM(s) Oral daily  pantoprazole    Tablet 40 milliGRAM(s) Oral before breakfast  senna 2 Tablet(s) Oral at bedtime  tiotropium 18 MICROgram(s) Capsule 1 Capsule(s) Inhalation daily  vancomycin  IVPB      vancomycin  IVPB 750 milliGRAM(s) IV Intermittent every 24 hours    MEDICATIONS  (PRN):  acetaminophen   Tablet. 650 milliGRAM(s) Oral every 6 hours PRN Moderate Pain (4 - 6)  dextrose Gel 1 Dose(s) Oral once PRN Blood Glucose LESS THAN 70 milliGRAM(s)/deciliter  glucagon  Injectable 1 milliGRAM(s) IntraMuscular once PRN Glucose LESS THAN 70 milligrams/deciliter  morphine  - Injectable 2 milliGRAM(s) IV Push every 4 hours PRN Moderate Pain (4 - 6)  ondansetron Injectable 4 milliGRAM(s) IV Push every 8 hours PRN Nausea and/or Vomiting  polyethylene glycol 3350 17 Gram(s) Oral daily PRN Constipation      Radiology:  < from: Xray Chest 1 View- PORTABLE-Routine (03.11.18 @ 05:49) >  Impression:      Calcified pleural plaques unchanged bilateral interstitial opacities   unchanged no pleural effusion or air leak    < end of copied text >

## 2018-03-12 NOTE — PROGRESS NOTE ADULT - ASSESSMENT
Patient is doing well.  No further hemoptysis.  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  This patient is close to be stable to go to the medical surgical floor but would observe for several more hours in an ICU setting.     · Assessment		  IMPRESSION:  AHRF Inflammatory vs infectious infiltrate?  HO PCP pneumonia ; BACTRIM WAS dc, PT RECEIVED HI DOSE STEROIDS X 3 DAYS, NOW TO 80MG Q6H  HO autoimmune hepatitis ; NEED FURTHER GI CONTACT WITH [facility]  REJI- MONITOR & MAINTAIN HYDRATION    PLAN:    CNS: no depressants    HEENT: Oral care    PULMONARY:  HOB @ 45 degrees.  Wean off O2 flow ;S/P High dose solumedrol for 2 days; Rheumatological screen;    CARDIOVASCULAR:  I = O; Hold lasix for today-DAILY REEVALUATION    ANEMIA-MAINTAIN HGB> 8; TRANSFUSE PRN

## 2018-03-12 NOTE — PROGRESS NOTE ADULT - SUBJECTIVE AND OBJECTIVE BOX
Over Night Events: no major changes-stable  Chart reviewed, patient examined. Pertinent results reviewed.  HD:#14  .   HERE FOR DEBRIDEMENT OPF INFECTED WOUND, DEVELOPED HYPOXIA-FOUND TO HAVE AN INTERSTITIAL PNEUMONITIS-PROBABLY RELATED TO HER IMMUNOSUPPRESSIVE MEDS.    ROS:  See HPI    PHYSICAL EXAM    ICU Vital Signs Last 24 Hrs  T(C): 36.1 (12 Mar 2018 08:00), Max: 36.7 (12 Mar 2018 00:00)  T(F): 97 (12 Mar 2018 08:00), Max: 98 (12 Mar 2018 00:00)  HR: 78 (12 Mar 2018 11:00) (64 - 104)  BP: 133/65 (12 Mar 2018 11:00) (113/60 - 157/86)  BP(mean): 78 (12 Mar 2018 11:00) (78 - 135)  ABP: --  ABP(mean): --  RR: 52 (12 Mar 2018 11:00) (18 - 52)  SpO2: 98% (12 Mar 2018 11:00) (87% - 99%)            Patient examined at 0600 hrs.  Spoke to Dr. Patricio this morning.  Patient sitting in chair awake alert.  She indicates that she is definitely feeling better.  She is using high flow oxygen.  She has a right leg dressing in place.  She tells me about being followed by Dr. BROWN at [place].  She has no hemoptysis over the last 12 hours.  Abdomen soft.  Chest with a few crackles.  hI FLOW O2-30-40%       I&O's Detail    11 Mar 2018 07:01  -  12 Mar 2018 07:00  --------------------------------------------------------  IN:    IV PiggyBack: 500 mL    Oral Fluid: 1025 mL  Total IN: 1525 mL    OUT:    Voided: 1325 mL  Total OUT: 1325 mL    Total NET: 200 mL          LABS:                          7.6    9.54  )-----------( 387      ( 12 Mar 2018 04:54 )             24.1         12 Mar 2018 04:54    139    |  100    |  33     ----------------------------<  234    4.4     |  30     |  1.6      Ca    9.0        12 Mar 2018 04:54    TPro  5.5    /  Alb  2.9    /  TBili  1.9    /  DBili  0.9    /  AST  47     /  ALT  54     /  AlkPhos  112    12 Mar 2018 04:54  Amylase x     lipase x                                                 PT/INR - ( 12 Mar 2018 04:54 )   PT: 14.10 sec;   INR: 1.30 ratio         PTT - ( 12 Mar 2018 04:54 )  PTT:22.7 sec                                                                                                                                                                                        MEDICATIONS  (STANDING):  ALBUTerol    90 MICROgram(s) HFA Inhaler 1 Puff(s) Inhalation every 4 hours  ALBUTerol/ipratropium for Nebulization 3 milliLiter(s) Nebulizer every 6 hours  chlorhexidine 4% Liquid 1 Application(s) Topical daily  dextrose 50% Injectable 12.5 Gram(s) IV Push once  dextrose 50% Injectable 25 Gram(s) IV Push once  dextrose 50% Injectable 25 Gram(s) IV Push once  docusate sodium 100 milliGRAM(s) Oral three times a day  insulin lispro Injectable (HumaLOG) 5 Unit(s) SubCutaneous before breakfast  insulin lispro Injectable (HumaLOG) 5 Unit(s) SubCutaneous before lunch  insulin lispro Injectable (HumaLOG) 5 Unit(s) SubCutaneous before dinner  meropenem  IVPB 1000 milliGRAM(s) IV Intermittent every 8 hours  meropenem  IVPB      methylPREDNISolone sodium succinate Injectable 80 milliGRAM(s) IV Push every 6 hours  metoprolol     tartrate 25 milliGRAM(s) Oral two times a day  montelukast 10 milliGRAM(s) Oral daily  pantoprazole    Tablet 40 milliGRAM(s) Oral before breakfast  senna 2 Tablet(s) Oral at bedtime  tacrolimus 0.5 milliGRAM(s) Oral at bedtime  tiotropium 18 MICROgram(s) Capsule 1 Capsule(s) Inhalation daily  vancomycin  IVPB      vancomycin  IVPB 750 milliGRAM(s) IV Intermittent every 24 hours    MEDICATIONS  (PRN):  acetaminophen   Tablet. 650 milliGRAM(s) Oral every 6 hours PRN Moderate Pain (4 - 6)  dextrose Gel 1 Dose(s) Oral once PRN Blood Glucose LESS THAN 70 milliGRAM(s)/deciliter  glucagon  Injectable 1 milliGRAM(s) IntraMuscular once PRN Glucose LESS THAN 70 milligrams/deciliter  morphine  - Injectable 2 milliGRAM(s) IV Push every 4 hours PRN Moderate Pain (4 - 6)  ondansetron Injectable 4 milliGRAM(s) IV Push every 8 hours PRN Nausea and/or Vomiting  polyethylene glycol 3350 17 Gram(s) Oral daily PRN Constipation          Xrays:  TLC:  OG:  ET tube:                                                                                       ECHO:    IMPRESSION:    PE:  CNS:AWAKE, OX3    HEENT:MM SL DRY    PULMONARY: MIN BIBASILAR CRACKLES/RALES    CARDIOVASCULAR:RR, W/O M,R    GI: GI prophylaxis.  Feeding ; ABD-SOFT,NT    EXT-ECCHYMOSES, WOUND ON LE    NEURO-GEN WEAK, NONFOCAL    HEMATOLOGICAL:  FEW ECCHYMOSES    ENDOCRINE:  Follow up FS.  Insulin protocol if needed.    MUSCULOSKELETAL:

## 2018-03-12 NOTE — PROGRESS NOTE ADULT - SUBJECTIVE AND OBJECTIVE BOX
Over Night Events:  Patient seen and examined.       ROS:  See HPI    PHYSICAL EXAM    ICU Vital Signs Last 24 Hrs  T(C): 36.1 (12 Mar 2018 08:00), Max: 36.7 (12 Mar 2018 00:00)  T(F): 97 (12 Mar 2018 08:00), Max: 98 (12 Mar 2018 00:00)  HR: 78 (12 Mar 2018 11:00) (64 - 104)  BP: 133/65 (12 Mar 2018 11:00) (113/60 - 157/86)  BP(mean): 78 (12 Mar 2018 11:00) (78 - 135)  ABP: --  ABP(mean): --  RR: 52 (12 Mar 2018 11:00) (18 - 52)  SpO2: 98% (12 Mar 2018 11:00) (87% - 99%)            Patient examined at 0600 hrs.  Spoke to Dr. Patricio this morning.  Patient sitting in chair awake alert.  She indicates that she is definitely feeling better.  She is using high flow oxygen.  She has a right leg dressing in place.  She tells me about being followed by Dr. BROWN at [place].  She has no hemoptysis over the last 12 hours.  Abdomen soft.  Chest with a few crackles.         I&O's Detail    11 Mar 2018 07:01  -  12 Mar 2018 07:00  --------------------------------------------------------  IN:    IV PiggyBack: 500 mL    Oral Fluid: 1025 mL  Total IN: 1525 mL    OUT:    Voided: 1325 mL  Total OUT: 1325 mL    Total NET: 200 mL          LABS:                          7.6    9.54  )-----------( 387      ( 12 Mar 2018 04:54 )             24.1         12 Mar 2018 04:54    139    |  100    |  33     ----------------------------<  234    4.4     |  30     |  1.6      Ca    9.0        12 Mar 2018 04:54    TPro  5.5    /  Alb  2.9    /  TBili  1.9    /  DBili  0.9    /  AST  47     /  ALT  54     /  AlkPhos  112    12 Mar 2018 04:54  Amylase x     lipase x                                                 PT/INR - ( 12 Mar 2018 04:54 )   PT: 14.10 sec;   INR: 1.30 ratio         PTT - ( 12 Mar 2018 04:54 )  PTT:22.7 sec                                                                                                                                                                                        MEDICATIONS  (STANDING):  ALBUTerol    90 MICROgram(s) HFA Inhaler 1 Puff(s) Inhalation every 4 hours  ALBUTerol/ipratropium for Nebulization 3 milliLiter(s) Nebulizer every 6 hours  chlorhexidine 4% Liquid 1 Application(s) Topical daily  dextrose 50% Injectable 12.5 Gram(s) IV Push once  dextrose 50% Injectable 25 Gram(s) IV Push once  dextrose 50% Injectable 25 Gram(s) IV Push once  docusate sodium 100 milliGRAM(s) Oral three times a day  insulin lispro Injectable (HumaLOG) 5 Unit(s) SubCutaneous before breakfast  insulin lispro Injectable (HumaLOG) 5 Unit(s) SubCutaneous before lunch  insulin lispro Injectable (HumaLOG) 5 Unit(s) SubCutaneous before dinner  meropenem  IVPB 1000 milliGRAM(s) IV Intermittent every 8 hours  meropenem  IVPB      methylPREDNISolone sodium succinate Injectable 80 milliGRAM(s) IV Push every 6 hours  metoprolol     tartrate 25 milliGRAM(s) Oral two times a day  montelukast 10 milliGRAM(s) Oral daily  pantoprazole    Tablet 40 milliGRAM(s) Oral before breakfast  senna 2 Tablet(s) Oral at bedtime  tacrolimus 0.5 milliGRAM(s) Oral at bedtime  tiotropium 18 MICROgram(s) Capsule 1 Capsule(s) Inhalation daily  vancomycin  IVPB      vancomycin  IVPB 750 milliGRAM(s) IV Intermittent every 24 hours    MEDICATIONS  (PRN):  acetaminophen   Tablet. 650 milliGRAM(s) Oral every 6 hours PRN Moderate Pain (4 - 6)  dextrose Gel 1 Dose(s) Oral once PRN Blood Glucose LESS THAN 70 milliGRAM(s)/deciliter  glucagon  Injectable 1 milliGRAM(s) IntraMuscular once PRN Glucose LESS THAN 70 milligrams/deciliter  morphine  - Injectable 2 milliGRAM(s) IV Push every 4 hours PRN Moderate Pain (4 - 6)  ondansetron Injectable 4 milliGRAM(s) IV Push every 8 hours PRN Nausea and/or Vomiting  polyethylene glycol 3350 17 Gram(s) Oral daily PRN Constipation          Xrays:  TLC:  OG:  ET tube:                                                                                       ECHO:    IMPRESSION:      PLAN:    CNS:    HEENT:    PULMONARY:    CARDIOVASCULAR:    GI: GI prophylaxis.  Feeding     RENAL:    INFECTIOUS DISEASE:    HEMATOLOGICAL:  DVT prophylaxis.    ENDOCRINE:  Follow up FS.  Insulin protocol if needed.    MUSCULOSKELETAL:

## 2018-03-12 NOTE — PROGRESS NOTE ADULT - SUBJECTIVE AND OBJECTIVE BOX
PGY I NOTE    LENGTH OF HOSPITAL STAY:  13d    CHIEF COMPLAINT:Patient is a 73y old  Female who presents with a chief complaint of right leg wound, needs debridement (04 Mar 2018 15:38)    HPI:HPI:  Pt states she fell Monday and went for evaluation to [facility] and laceration was sutured and pt was discharged. Pt came back today for increasing pain in right LE. Patient denies calf pain, fevers, chest pain, SOB, abdominal pain, nausea, vomiting, diarrhea, dizziness, weakness. (27 Feb 2018 13:32)    OVERNIGHT EVENTS/INTERVAL UPDATES:    PMH & PSH  PAST MEDICAL & SURGICAL HISTORY:  Essential hypertension  Autoimmune hepatitis treated with steroids  Asthma  DVT (deep venous thrombosis)  No significant past surgical history    SOCIAL HISTORY: Negative    ALLERGIES: No Known Allergies    HOME MEDICATIONS  Home Medications:  amLODIPine 10 mg oral tablet: 1 tab(s) orally once a day (27 Feb 2018 13:44)  budesonide 3 mg oral capsule, extended release: 1 cap(s) orally 2 times a day (27 Feb 2018 22:03)  metoprolol tartrate 50 mg oral tablet: 1 tab(s) orally once a day (27 Feb 2018 13:44)  predniSONE 10 mg oral tablet: 1 tab(s) orally once a day (27 Feb 2018 13:44)  Singulair 10 mg oral tablet: 1 tab(s) orally once a day (27 Feb 2018 13:44)  tacrolimus 0.5 mg oral capsule: 1 cap(s) orally once a day (at bedtime) (27 Feb 2018 22:01)  tacrolimus 1 mg oral capsule: orally once a day in the morning  (27 Feb 2018 22:00)    PHYSICAL EXAM:  T(F): 97 (03-12-18 @ 08:00), Max: 98 (03-12-18 @ 00:00)  HR: 78 (03-12-18 @ 11:00)  BP: 133/65 (03-12-18 @ 11:00)  RR: 52 (03-12-18 @ 11:00)  SpO2: 98% (03-12-18 @ 11:00)  CAPILLARY BLOOD GLUCOSE  309 (12 Mar 2018 11:00)  262 (12 Mar 2018 06:00)  250 (11 Mar 2018 22:00)  284 (11 Mar 2018 16:00)  290 (11 Mar 2018 12:00)          03-11-18 @ 07:01  -  03-12-18 @ 07:00  --------------------------------------------------------  IN:    IV PiggyBack: 500 mL    Oral Fluid: 1025 mL  Total IN: 1525 mL    OUT:    Voided: 1325 mL  Total OUT: 1325 mL    Total NET: 200 mL            General: NAD  HEENT: NCAT  CV: RRR  RESP: CTAB  Abdominal: Soft, NTTP  Extremity: no c/c/e  Neuro: A&O x3    MEDICATIONS  STANDING MEDICATIONS  ALBUTerol    90 MICROgram(s) HFA Inhaler 1 Puff(s) Inhalation every 4 hours  ALBUTerol/ipratropium for Nebulization 3 milliLiter(s) Nebulizer every 6 hours  chlorhexidine 4% Liquid 1 Application(s) Topical daily  dextrose 50% Injectable 12.5 Gram(s) IV Push once  dextrose 50% Injectable 25 Gram(s) IV Push once  dextrose 50% Injectable 25 Gram(s) IV Push once  docusate sodium 100 milliGRAM(s) Oral three times a day  insulin lispro Injectable (HumaLOG) 5 Unit(s) SubCutaneous before breakfast  insulin lispro Injectable (HumaLOG) 5 Unit(s) SubCutaneous before lunch  insulin lispro Injectable (HumaLOG) 5 Unit(s) SubCutaneous before dinner  meropenem  IVPB 1000 milliGRAM(s) IV Intermittent every 8 hours  meropenem  IVPB      methylPREDNISolone sodium succinate Injectable 80 milliGRAM(s) IV Push every 6 hours  metoprolol     tartrate 25 milliGRAM(s) Oral two times a day  montelukast 10 milliGRAM(s) Oral daily  pantoprazole    Tablet 40 milliGRAM(s) Oral before breakfast  senna 2 Tablet(s) Oral at bedtime  tacrolimus 0.5 milliGRAM(s) Oral at bedtime  tiotropium 18 MICROgram(s) Capsule 1 Capsule(s) Inhalation daily  vancomycin  IVPB      vancomycin  IVPB 750 milliGRAM(s) IV Intermittent every 24 hours    PRN MEDICATIONS  acetaminophen   Tablet. 650 milliGRAM(s) Oral every 6 hours PRN  dextrose Gel 1 Dose(s) Oral once PRN  glucagon  Injectable 1 milliGRAM(s) IntraMuscular once PRN  morphine  - Injectable 2 milliGRAM(s) IV Push every 4 hours PRN  ondansetron Injectable 4 milliGRAM(s) IV Push every 8 hours PRN  polyethylene glycol 3350 17 Gram(s) Oral daily PRN    LABS:                        7.6    9.54  )-----------( 387      ( 12 Mar 2018 04:54 )             24.1              03-12    139  |  100  |  33<H>  ----------------------------<  234<H>  4.4   |  30  |  1.6<H>    Ca    9.0      12 Mar 2018 04:54    TPro  5.5<L>  /  Alb  2.9<L>  /  TBili  1.9<H>  /  DBili  0.9<H>  /  AST  47<H>  /  ALT  54<H>  /  AlkPhos  112  03-12    LIVER FUNCTIONS - ( 12 Mar 2018 04:54 )  Alb: 2.9 g/dL / Pro: 5.5 g/dL / ALK PHOS: 112 U/L / ALT: 54 U/L / AST: 47 U/L / GGT: x                      PT/INR - ( 12 Mar 2018 04:54 )   PT: 14.10 sec;   INR: 1.30 ratio         PTT - ( 12 Mar 2018 04:54 )  PTT:22.7 sec                        IMAGING: CXR improved      ASSESSMENT & PLANS:    CNS: no sedation, monitor mental status    HEENT: oral care    PULMONARY: DC high dose solumedrol; decrease solumedrol 80 Q6H; continue high flow nc; hob 30    CARDIOVASCULAR: H&H Q12, monitor hgb    GI: restart prograf bc LFTs uptrending; contact GI physicians from [place]    RENAL: monitor lytes    INFECTIOUS DISEASE: continue shyann and vanc; vanc trough tomorrow am     HEMATOLOGICAL: monitor cbc Q12    ENDOCRINE: monitor glucose    MUSCULOSKELETAL: out of bed to chair

## 2018-03-12 NOTE — PROGRESS NOTE ADULT - ASSESSMENT
IMPRESSION:  AHRF Inflammatory vs infectious infiltrate?   DAH   HO PCP pneumonia   HO autoimmune hepatitis   Prothrombin gene mutation - DVT last year   Acute on chronic anemia   REJI improved    PLAN:    CNS: no depressants    HEENT: Oral care    PULMONARY:  HOB @ 45 degrees.  Wean off O2 flow ; Change solumedrol to 80 q6h; FU BAL cultures;     CARDIOVASCULAR:  I = O;     GI: GI prophylaxis.  Feeding  as tolerated; Resume prograf    RENAL:  Follow up lytes.  Correct as needed    INFECTIOUS DISEASE: Follow up cultures.  Cont Vanc Merrem; Vanco trough     HEMATOLOGICAL:  DVT prophylaxis SCD;  H/H FU q 12h ; LE duplex negative    ENDOCRINE:  Follow up FS.  Insulin protocol if needed.    ICU monitoring

## 2018-03-12 NOTE — PROGRESS NOTE ADULT - ASSESSMENT
I examined this patient at approximately 0600 hrs.  I spoke to Dr. ROGER and GEORGE at 0830 hrs. and again at 1350 hrs.  Patient is doing well.  No further hemoptysis.  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  This patient is close to be stable to go to the medical surgical floor but would observe for several more hours in an ICU setting.

## 2018-03-13 LAB
ALBUMIN SERPL ELPH-MCNC: 2.8 G/DL — LOW (ref 3–5.5)
ALP SERPL-CCNC: 134 U/L — HIGH (ref 30–115)
ALT FLD-CCNC: 77 U/L — HIGH (ref 0–41)
ANION GAP SERPL CALC-SCNC: 7 MMOL/L — SIGNIFICANT CHANGE UP (ref 7–14)
AST SERPL-CCNC: 51 U/L — HIGH (ref 0–41)
AUTO DIFF PNL BLD: NEGATIVE — SIGNIFICANT CHANGE UP
BILIRUB DIRECT SERPL-MCNC: 0.7 MG/DL — HIGH (ref 0–0.2)
BILIRUB INDIRECT FLD-MCNC: 1.2 MG/DL — SIGNIFICANT CHANGE UP
BILIRUB SERPL-MCNC: 1.9 MG/DL — HIGH (ref 0.2–1.2)
BLD GP AB SCN SERPL QL: SIGNIFICANT CHANGE UP
BUN SERPL-MCNC: 34 MG/DL — HIGH (ref 10–20)
C-ANCA SER-ACNC: NEGATIVE — SIGNIFICANT CHANGE UP
CALCIUM SERPL-MCNC: 8.8 MG/DL — SIGNIFICANT CHANGE UP (ref 8.5–10.1)
CHLORIDE SERPL-SCNC: 101 MMOL/L — SIGNIFICANT CHANGE UP (ref 98–110)
CO2 SERPL-SCNC: 31 MMOL/L — SIGNIFICANT CHANGE UP (ref 17–32)
CREAT SERPL-MCNC: 1.3 MG/DL — SIGNIFICANT CHANGE UP (ref 0.7–1.5)
DSDNA AB FLD-ACNC: <0.2 AI — SIGNIFICANT CHANGE UP
ENA SS-A AB FLD IA-ACNC: <0.2 AI — SIGNIFICANT CHANGE UP
GLUCOSE SERPL-MCNC: 266 MG/DL — HIGH (ref 70–110)
HCT VFR BLD CALC: 25.3 % — LOW (ref 37–47)
HGB BLD-MCNC: 7.7 G/DL — LOW (ref 12–16)
INR BLD: 1.31 RATIO — HIGH (ref 0.65–1.3)
MAGNESIUM SERPL-MCNC: 2.4 MG/DL — SIGNIFICANT CHANGE UP (ref 1.8–2.4)
MCHC RBC-ENTMCNC: 22.6 PG — LOW (ref 27–31)
MCHC RBC-ENTMCNC: 30.4 G/DL — LOW (ref 32–37)
MCV RBC AUTO: 74.2 FL — LOW (ref 81–99)
NRBC # BLD: 0 /100 WBCS — SIGNIFICANT CHANGE UP (ref 0–0)
P-ANCA SER-ACNC: NEGATIVE — SIGNIFICANT CHANGE UP
PLATELET # BLD AUTO: 384 K/UL — SIGNIFICANT CHANGE UP (ref 130–400)
POTASSIUM SERPL-MCNC: 4.3 MMOL/L — SIGNIFICANT CHANGE UP (ref 3.5–5)
POTASSIUM SERPL-SCNC: 4.3 MMOL/L — SIGNIFICANT CHANGE UP (ref 3.5–5)
PROT SERPL-MCNC: 5.3 G/DL — LOW (ref 6–8)
PROTHROM AB SERPL-ACNC: 14.2 SEC — HIGH (ref 9.95–12.87)
RBC # BLD: 3.41 M/UL — LOW (ref 4.2–5.4)
RBC # FLD: 18.4 % — HIGH (ref 11.5–14.5)
SODIUM SERPL-SCNC: 139 MMOL/L — SIGNIFICANT CHANGE UP (ref 135–146)
TYPE + AB SCN PNL BLD: SIGNIFICANT CHANGE UP
WBC # BLD: 11.05 K/UL — HIGH (ref 4.8–10.8)
WBC # FLD AUTO: 11.05 K/UL — HIGH (ref 4.8–10.8)

## 2018-03-13 RX ORDER — LINEZOLID 600 MG/300ML
600 INJECTION, SOLUTION INTRAVENOUS EVERY 12 HOURS
Qty: 0 | Refills: 0 | Status: DISCONTINUED | OUTPATIENT
Start: 2018-03-13 | End: 2018-03-15

## 2018-03-13 RX ORDER — INSULIN LISPRO 100/ML
5 VIAL (ML) SUBCUTANEOUS ONCE
Qty: 0 | Refills: 0 | Status: COMPLETED | OUTPATIENT
Start: 2018-03-13 | End: 2018-03-13

## 2018-03-13 RX ORDER — LINEZOLID 600 MG/300ML
INJECTION, SOLUTION INTRAVENOUS
Qty: 0 | Refills: 0 | Status: DISCONTINUED | OUTPATIENT
Start: 2018-03-13 | End: 2018-03-15

## 2018-03-13 RX ORDER — MICAFUNGIN SODIUM 100 MG/1
100 INJECTION, POWDER, LYOPHILIZED, FOR SOLUTION INTRAVENOUS EVERY 24 HOURS
Qty: 0 | Refills: 0 | Status: DISCONTINUED | OUTPATIENT
Start: 2018-03-14 | End: 2018-03-15

## 2018-03-13 RX ORDER — MICAFUNGIN SODIUM 100 MG/1
100 INJECTION, POWDER, LYOPHILIZED, FOR SOLUTION INTRAVENOUS ONCE
Qty: 0 | Refills: 0 | Status: COMPLETED | OUTPATIENT
Start: 2018-03-13 | End: 2018-03-13

## 2018-03-13 RX ORDER — LINEZOLID 600 MG/300ML
600 INJECTION, SOLUTION INTRAVENOUS ONCE
Qty: 0 | Refills: 0 | Status: COMPLETED | OUTPATIENT
Start: 2018-03-13 | End: 2018-03-13

## 2018-03-13 RX ORDER — MICAFUNGIN SODIUM 100 MG/1
INJECTION, POWDER, LYOPHILIZED, FOR SOLUTION INTRAVENOUS
Qty: 0 | Refills: 0 | Status: DISCONTINUED | OUTPATIENT
Start: 2018-03-13 | End: 2018-03-15

## 2018-03-13 RX ADMIN — LINEZOLID 300 MILLIGRAM(S): 600 INJECTION, SOLUTION INTRAVENOUS at 19:56

## 2018-03-13 RX ADMIN — Medication 5 UNIT(S): at 08:48

## 2018-03-13 RX ADMIN — MONTELUKAST 10 MILLIGRAM(S): 4 TABLET, CHEWABLE ORAL at 11:40

## 2018-03-13 RX ADMIN — Medication 3 MILLILITER(S): at 08:52

## 2018-03-13 RX ADMIN — Medication 650 MILLIGRAM(S): at 14:30

## 2018-03-13 RX ADMIN — Medication 100 MILLIGRAM(S): at 05:01

## 2018-03-13 RX ADMIN — Medication 650 MILLIGRAM(S): at 14:04

## 2018-03-13 RX ADMIN — Medication 5 UNIT(S): at 23:31

## 2018-03-13 RX ADMIN — Medication 3 MILLILITER(S): at 14:24

## 2018-03-13 RX ADMIN — Medication 650 MILLIGRAM(S): at 21:22

## 2018-03-13 RX ADMIN — TACROLIMUS 0.5 MILLIGRAM(S): 5 CAPSULE ORAL at 21:22

## 2018-03-13 RX ADMIN — Medication 5 UNIT(S): at 11:40

## 2018-03-13 RX ADMIN — Medication 25 MILLIGRAM(S): at 17:12

## 2018-03-13 RX ADMIN — PANTOPRAZOLE SODIUM 40 MILLIGRAM(S): 20 TABLET, DELAYED RELEASE ORAL at 08:48

## 2018-03-13 RX ADMIN — MICAFUNGIN SODIUM 105 MILLIGRAM(S): 100 INJECTION, POWDER, LYOPHILIZED, FOR SOLUTION INTRAVENOUS at 13:15

## 2018-03-13 RX ADMIN — Medication 5 UNIT(S): at 17:11

## 2018-03-13 RX ADMIN — Medication 80 MILLIGRAM(S): at 05:01

## 2018-03-13 RX ADMIN — Medication 25 MILLIGRAM(S): at 05:01

## 2018-03-13 RX ADMIN — Medication 3 MILLILITER(S): at 20:47

## 2018-03-13 RX ADMIN — Medication 80 MILLIGRAM(S): at 17:11

## 2018-03-13 RX ADMIN — Medication 80 MILLIGRAM(S): at 11:39

## 2018-03-13 RX ADMIN — LINEZOLID 300 MILLIGRAM(S): 600 INJECTION, SOLUTION INTRAVENOUS at 10:39

## 2018-03-13 NOTE — PROGRESS NOTE ADULT - ASSESSMENT
72 yo female with hx of HTN, autoimmune hepatitis, asthma, DVT presented to the ED after rightleg wound. Had the wound sutured at South site but then came back to the ED for worsening pain. She is s/p debridement with Burn team. Pt was transferred to medicine yesterday due to worsening SOB and requiring more O2.     Acute Respiratory Failure,  Pulmonary Edema vs Interstitial lung disease, PNA   - remains in ICU setting   - Remains O2 dependent, O2 lowered   - Pulm f/u noted   - continue on IV lasix and IV solumedrol   - continue on IV Zyvox and Micafungin    - continue respiratory therapy    Hx of DVT and PE   - Pt has heterozygous prothrombin gene mutation and cause of unprovoked DVT and PE in March 2017.    - INR remains supratherapeutic     - Coumadin on hold   - continue to monitor daily INR's    Right leg laceration s/p debridement   - contact isolation for MRSA   - wound care as per Burn    HTN   - continue Norvasc and Metoprolol   - monitor BP    Nutritional support  GI prophylaxsis    Full code status

## 2018-03-13 NOTE — CHART NOTE - NSCHARTNOTEFT_GEN_A_CORE
Registered Dietitian Follow-Up - limited (ICU 113a)     Patient Profile Reviewed                           Yes [v]   No []     Nutrition History Previously Obtained        Yes [v]  No []       Pertinent Information: Pt was seen by ID today, impression: PNA, on abx. Currently on supplemental O2 via NC. Pt now complains of LUE pain (IV site), MD and RN aware. Pt was seen by GI for autoimmune hepatitis, no further recommendation other than keeping pt on the same medications. Meds/labs noted. Weights: 82.6kg (3/12) vs. 78.1kg (3/7) – ? accuracy vs. fluid change. Skin :  RLE wound (s/p debridement), RUE skin tear.  Diet: Regular , pt reports good appetite; documented PO: %. Regular BMs       No active nutrition diagnosis identified at this time       Nutrition Intervention: Meal and snacks.      Goal/Expected Outcome: PO intake continues  > 75% meals and snacks.       Indicator/Monitoring: Will monitor energy intake, labs, body composition, nutrition focused physical findings.     Recommended: 2gm Na, HH diet.     Not at risk f/u.

## 2018-03-13 NOTE — PROGRESS NOTE ADULT - ASSESSMENT
PNA.  BAL positive for ORSA, few Yeast ( which could well be a colonizer).    D/C vanco  Zyvox 600mg iv q12h.  Micafungin 100mg iv q24h.

## 2018-03-13 NOTE — PROGRESS NOTE ADULT - SUBJECTIVE AND OBJECTIVE BOX
DARNELL, DONI  73y, Female      OVERNIGHT EVENTS:    no fevers, feels well, no pressors, no cough/SOB    VITALS:  T(F): 98.5, Max: 98.5 (03-13-18 @ 04:00)  HR: 60  BP: 130/68  RR: 36Vital Signs Last 24 Hrs  T(C): 36.9 (13 Mar 2018 04:00), Max: 36.9 (13 Mar 2018 04:00)  T(F): 98.5 (13 Mar 2018 04:00), Max: 98.5 (13 Mar 2018 04:00)  HR: 60 (13 Mar 2018 06:00) (60 - 96)  BP: 130/68 (13 Mar 2018 06:00) (122/63 - 163/84)  BP(mean): 95 (13 Mar 2018 06:00) (78 - 112)  RR: 36 (13 Mar 2018 06:00) (18 - 52)  SpO2: 90% (13 Mar 2018 06:00) (90% - 99%)    TESTS & MEASUREMENTS:                        7.7    11.05 )-----------( 384      ( 13 Mar 2018 04:47 )             25.3     03-12    139  |  100  |  33<H>  ----------------------------<  234<H>  4.4   |  30  |  1.6<H>    Ca    9.0      12 Mar 2018 04:54    TPro  5.5<L>  /  Alb  2.9<L>  /  TBili  1.9<H>  /  DBili  0.9<H>  /  AST  47<H>  /  ALT  54<H>  /  AlkPhos  112  03-12    LIVER FUNCTIONS - ( 12 Mar 2018 04:54 )  Alb: 2.9 g/dL / Pro: 5.5 g/dL / ALK PHOS: 112 U/L / ALT: 54 U/L / AST: 47 U/L / GGT: x             Culture - Fungal, Bronchial (collected 03-09-18 @ 09:30)  Source: .Broncial BAL  Preliminary Report (03-12-18 @ 12:07):    Few-moderate Yeast    Culture - Bronchial (collected 03-09-18 @ 09:30)  Source: Bronch Wash BAL  Gram Stain (03-09-18 @ 23:58):    Few polymorphonuclear leukocytes per low power field    Moderate Squamous epithelial cells per low power field    Few Yeast like cells per oil power field    Moderate Gram Positive Cocci in Clusters per oil power field    Few Gram Negative Diplococci per oil power field    Rare Gram Negative Rods per oil power field  Final Report (03-11-18 @ 16:58):    Moderate Methicillin resistant Staphylococcus aureus    Normal Respiratory Nikki present  Organism: Methicillin resistant Staphylococcus aureus (03-11-18 @ 16:58)  Organism: Methicillin resistant Staphylococcus aureus (03-11-18 @ 16:58)      -  Ampicillin/Sulbactam: R 16/8      -  Cefazolin: R 8      -  Ciprofloxacin: R >2      -  Clindamycin: R <=0.25      -  Erythromycin: R >4      -  Gentamicin: S 2      -  Levofloxacin: R >4      -  Linezolid: S 2      -  Moxifloxacin(Aerobic): R 2      -  Oxacillin: R >2      -  Penicillin: R >8      -  RIF- Rifampin: S <=1      -  Tetra/Doxy: S 4      -  Trimethoprim/Sulfamethoxazole: S <=0.5/9.5      -  Vancomycin: S 2      Method Type: EROS    Culture - Acid Fast - Bronchial w/Smear (collected 03-09-18 @ 09:30)  Source: .Broncial Bronchoalveolar Lavage            RADIOLOGY & ADDITIONAL TESTS:    ANTIBIOTICS:  vancomycin  IVPB      vancomycin  IVPB 750 milliGRAM(s) IV Intermittent every 24 hours

## 2018-03-13 NOTE — PROGRESS NOTE ADULT - SUBJECTIVE AND OBJECTIVE BOX
Over Night Events:  Patient seen and examined.       ROS:  See HPI    PHYSICAL EXAM    ICU Vital Signs Last 24 Hrs  T(C): 37.2 (13 Mar 2018 12:00), Max: 37.2 (13 Mar 2018 08:00)  T(F): 98.9 (13 Mar 2018 12:00), Max: 98.9 (13 Mar 2018 08:00)  HR: 84 (13 Mar 2018 12:00) (60 - 96)  BP: 138/66 (13 Mar 2018 12:00) (113/65 - 163/84)  BP(mean): 86 (13 Mar 2018 12:00) (81 - 112)  ABP: --  ABP(mean): --  RR: 28 (13 Mar 2018 12:00) (18 - 47)  SpO2: 98% (13 Mar 2018 12:00) (90% - 99%)         This patient was examined at approximately 0915 hrs.  At that time she was awake alert oriented.  She was sitting in a chair.  We asked her many many questions about espressos exposure because of her x-ray.  She knows Dr. Kang because this is the second time she has had a chronic wound infection.  There has been no hemoptysis over the last 24 hours.  Her breathing is better but still not good.  Her coagulopathy is a protein prothrombin gene variant.  Her skin is warm and dry except for her right leg which is covered.  Her chest is clear.  Her heart reveals S1-S2 no S3 no S4.  Abdomen is soft.  Plan try to discontinue the high flow oxygen if she can't tolerate nasal cannula oxygen she can be transferred to the medical surgical floor.  Her chest x-ray remains abnormal.  The pleural plaquing is chronic.  The infiltrates still seemed to be new.  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    I&O's Detail    12 Mar 2018 07:01  -  13 Mar 2018 07:00  --------------------------------------------------------  IN:    IV PiggyBack: 200 mL    Oral Fluid: 320 mL  Total IN: 520 mL    OUT:    Voided: 300 mL  Total OUT: 300 mL    Total NET: 220 mL          LABS:                          7.7    11.05 )-----------( 384      ( 13 Mar 2018 04:47 )             25.3         13 Mar 2018 04:47    139    |  101    |  34     ----------------------------<  266    4.3     |  31     |  1.3      Ca    8.8        13 Mar 2018 04:47  Mg     2.4       13 Mar 2018 04:47    TPro  5.3    /  Alb  2.8    /  TBili  1.9    /  DBili  0.7    /  AST  51     /  ALT  77     /  AlkPhos  134    13 Mar 2018 04:47  Amylase x     lipase x                                                 PT/INR - ( 13 Mar 2018 04:47 )   PT: 14.20 sec;   INR: 1.31 ratio         PTT - ( 12 Mar 2018 04:54 )  PTT:22.7 sec                                                                                                                                                                                        MEDICATIONS  (STANDING):  ALBUTerol    90 MICROgram(s) HFA Inhaler 1 Puff(s) Inhalation every 4 hours  ALBUTerol/ipratropium for Nebulization 3 milliLiter(s) Nebulizer every 6 hours  chlorhexidine 4% Liquid 1 Application(s) Topical daily  dextrose 50% Injectable 12.5 Gram(s) IV Push once  dextrose 50% Injectable 25 Gram(s) IV Push once  dextrose 50% Injectable 25 Gram(s) IV Push once  docusate sodium 100 milliGRAM(s) Oral three times a day  insulin lispro Injectable (HumaLOG) 5 Unit(s) SubCutaneous before breakfast  insulin lispro Injectable (HumaLOG) 5 Unit(s) SubCutaneous before lunch  insulin lispro Injectable (HumaLOG) 5 Unit(s) SubCutaneous before dinner  linezolid  IVPB 600 milliGRAM(s) IV Intermittent every 12 hours  linezolid  IVPB      methylPREDNISolone sodium succinate Injectable 80 milliGRAM(s) IV Push every 6 hours  metoprolol     tartrate 25 milliGRAM(s) Oral two times a day  micafungin IVPB      micafungin IVPB 100 milliGRAM(s) IV Intermittent once  montelukast 10 milliGRAM(s) Oral daily  pantoprazole    Tablet 40 milliGRAM(s) Oral before breakfast  senna 2 Tablet(s) Oral at bedtime  tacrolimus 0.5 milliGRAM(s) Oral at bedtime  tiotropium 18 MICROgram(s) Capsule 1 Capsule(s) Inhalation daily    MEDICATIONS  (PRN):  acetaminophen   Tablet. 650 milliGRAM(s) Oral every 6 hours PRN Moderate Pain (4 - 6)  dextrose Gel 1 Dose(s) Oral once PRN Blood Glucose LESS THAN 70 milliGRAM(s)/deciliter  glucagon  Injectable 1 milliGRAM(s) IntraMuscular once PRN Glucose LESS THAN 70 milligrams/deciliter  morphine  - Injectable 2 milliGRAM(s) IV Push every 4 hours PRN Moderate Pain (4 - 6)  ondansetron Injectable 4 milliGRAM(s) IV Push every 8 hours PRN Nausea and/or Vomiting  polyethylene glycol 3350 17 Gram(s) Oral daily PRN Constipation          Xrays:  TLC:  OG:  ET tube:                                                                                       ECHO:    IMPRESSION:      PLAN:    CNS:    HEENT:    PULMONARY:    CARDIOVASCULAR:    GI: GI prophylaxis.  Feeding     RENAL:    INFECTIOUS DISEASE:    HEMATOLOGICAL:  DVT prophylaxis.    ENDOCRINE:  Follow up FS.  Insulin protocol if needed.    MUSCULOSKELETAL:

## 2018-03-13 NOTE — PROGRESS NOTE ADULT - SUBJECTIVE AND OBJECTIVE BOX
DONI PATRICK  73y  Female  HPI:  Pt states she fell Monday and went for evaluation to [facility] and laceration was sutured and pt was discharged. Pt came back today for increasing pain in right LE. Patient denies calf pain, fevers, chest pain, SOB, abdominal pain, nausea, vomiting, diarrhea, dizziness, weakness. (27 Feb 2018 13:32)    MEDICATIONS  (STANDING):  ALBUTerol    90 MICROgram(s) HFA Inhaler 1 Puff(s) Inhalation every 4 hours  ALBUTerol/ipratropium for Nebulization 3 milliLiter(s) Nebulizer every 6 hours  chlorhexidine 4% Liquid 1 Application(s) Topical daily  dextrose 50% Injectable 12.5 Gram(s) IV Push once  dextrose 50% Injectable 25 Gram(s) IV Push once  dextrose 50% Injectable 25 Gram(s) IV Push once  docusate sodium 100 milliGRAM(s) Oral three times a day  insulin lispro Injectable (HumaLOG) 5 Unit(s) SubCutaneous before breakfast  insulin lispro Injectable (HumaLOG) 5 Unit(s) SubCutaneous before lunch  insulin lispro Injectable (HumaLOG) 5 Unit(s) SubCutaneous before dinner  linezolid  IVPB 600 milliGRAM(s) IV Intermittent once  linezolid  IVPB 600 milliGRAM(s) IV Intermittent every 12 hours  linezolid  IVPB      methylPREDNISolone sodium succinate Injectable 80 milliGRAM(s) IV Push every 6 hours  metoprolol     tartrate 25 milliGRAM(s) Oral two times a day  micafungin IVPB      montelukast 10 milliGRAM(s) Oral daily  pantoprazole    Tablet 40 milliGRAM(s) Oral before breakfast  senna 2 Tablet(s) Oral at bedtime  tacrolimus 0.5 milliGRAM(s) Oral at bedtime  tiotropium 18 MICROgram(s) Capsule 1 Capsule(s) Inhalation daily    MEDICATIONS  (PRN):  acetaminophen   Tablet. 650 milliGRAM(s) Oral every 6 hours PRN Moderate Pain (4 - 6)  dextrose Gel 1 Dose(s) Oral once PRN Blood Glucose LESS THAN 70 milliGRAM(s)/deciliter  glucagon  Injectable 1 milliGRAM(s) IntraMuscular once PRN Glucose LESS THAN 70 milligrams/deciliter  morphine  - Injectable 2 milliGRAM(s) IV Push every 4 hours PRN Moderate Pain (4 - 6)  ondansetron Injectable 4 milliGRAM(s) IV Push every 8 hours PRN Nausea and/or Vomiting  polyethylene glycol 3350 17 Gram(s) Oral daily PRN Constipation    INTERVAL EVENTS: Patient seen today OOB in chair i good spirits. O2 weaned to NC. Patient concerned about the high dose steroids, and muscle weakness. PAtient required a 2 person assisst to get OOB.    T(C): 37.2 (03-13-18 @ 08:00), Max: 37.2 (03-13-18 @ 08:00)  HR: 86 (03-13-18 @ 09:00) (60 - 96)  BP: 113/65 (03-13-18 @ 09:00) (113/65 - 163/84)  RR: 37 (03-13-18 @ 09:00) (18 - 52)  SpO2: 97% (03-13-18 @ 09:00) (90% - 99%)  Wt(kg): --Vital Signs Last 24 Hrs  T(C): 37.2 (13 Mar 2018 08:00), Max: 37.2 (13 Mar 2018 08:00)  T(F): 98.9 (13 Mar 2018 08:00), Max: 98.9 (13 Mar 2018 08:00)  HR: 86 (13 Mar 2018 09:00) (60 - 96)  BP: 113/65 (13 Mar 2018 09:00) (113/65 - 163/84)  BP(mean): 95 (13 Mar 2018 09:00) (78 - 112)  RR: 37 (13 Mar 2018 09:00) (18 - 52)  SpO2: 97% (13 Mar 2018 09:00) (90% - 99%)    PHYSICAL EXAM:  GENERAL: NAD  NECK: Supple, No JVD  CHEST/LUNG: Few crackles at right base  HEART: S1, S2, Regular rate and rhythm;   ABDOMEN: Soft, Nontender, Nondistended; Bowel sounds present  EXTREMITIES: + edema  SKIN: thin fragile skin, RLE wound covered    LABS:    RADIOLOGY & ADDITIONAL TESTS: DONI PATRICK  73y  Female  HPI:  Pt states she fell Monday and went for evaluation to [facility] and laceration was sutured and pt was discharged. Pt came back today for increasing pain in right LE. Patient denies calf pain, fevers, chest pain, SOB, abdominal pain, nausea, vomiting, diarrhea, dizziness, weakness. (27 Feb 2018 13:32)    MEDICATIONS  (STANDING):  ALBUTerol    90 MICROgram(s) HFA Inhaler 1 Puff(s) Inhalation every 4 hours  ALBUTerol/ipratropium for Nebulization 3 milliLiter(s) Nebulizer every 6 hours  chlorhexidine 4% Liquid 1 Application(s) Topical daily  dextrose 50% Injectable 12.5 Gram(s) IV Push once  dextrose 50% Injectable 25 Gram(s) IV Push once  dextrose 50% Injectable 25 Gram(s) IV Push once  docusate sodium 100 milliGRAM(s) Oral three times a day  insulin lispro Injectable (HumaLOG) 5 Unit(s) SubCutaneous before breakfast  insulin lispro Injectable (HumaLOG) 5 Unit(s) SubCutaneous before lunch  insulin lispro Injectable (HumaLOG) 5 Unit(s) SubCutaneous before dinner  linezolid  IVPB 600 milliGRAM(s) IV Intermittent once  linezolid  IVPB 600 milliGRAM(s) IV Intermittent every 12 hours  linezolid  IVPB      methylPREDNISolone sodium succinate Injectable 80 milliGRAM(s) IV Push every 6 hours  metoprolol     tartrate 25 milliGRAM(s) Oral two times a day  micafungin IVPB      montelukast 10 milliGRAM(s) Oral daily  pantoprazole    Tablet 40 milliGRAM(s) Oral before breakfast  senna 2 Tablet(s) Oral at bedtime  tacrolimus 0.5 milliGRAM(s) Oral at bedtime  tiotropium 18 MICROgram(s) Capsule 1 Capsule(s) Inhalation daily    MEDICATIONS  (PRN):  acetaminophen   Tablet. 650 milliGRAM(s) Oral every 6 hours PRN Moderate Pain (4 - 6)  dextrose Gel 1 Dose(s) Oral once PRN Blood Glucose LESS THAN 70 milliGRAM(s)/deciliter  glucagon  Injectable 1 milliGRAM(s) IntraMuscular once PRN Glucose LESS THAN 70 milligrams/deciliter  morphine  - Injectable 2 milliGRAM(s) IV Push every 4 hours PRN Moderate Pain (4 - 6)  ondansetron Injectable 4 milliGRAM(s) IV Push every 8 hours PRN Nausea and/or Vomiting  polyethylene glycol 3350 17 Gram(s) Oral daily PRN Constipation    INTERVAL EVENTS: Patient seen today OOB in chair i good spirits. O2 weaned to NC. Patient concerned about the high dose steroids, and muscle weakness. PAtient required a 2 person assisst to get OOB.    T(C): 37.2 (03-13-18 @ 08:00), Max: 37.2 (03-13-18 @ 08:00)  HR: 86 (03-13-18 @ 09:00) (60 - 96)  BP: 113/65 (03-13-18 @ 09:00) (113/65 - 163/84)  RR: 37 (03-13-18 @ 09:00) (18 - 52)  SpO2: 97% (03-13-18 @ 09:00) (90% - 99%)  Wt(kg): --Vital Signs Last 24 Hrs  T(C): 37.2 (13 Mar 2018 08:00), Max: 37.2 (13 Mar 2018 08:00)  T(F): 98.9 (13 Mar 2018 08:00), Max: 98.9 (13 Mar 2018 08:00)  HR: 86 (13 Mar 2018 09:00) (60 - 96)  BP: 113/65 (13 Mar 2018 09:00) (113/65 - 163/84)  BP(mean): 95 (13 Mar 2018 09:00) (78 - 112)  RR: 37 (13 Mar 2018 09:00) (18 - 52)  SpO2: 97% (13 Mar 2018 09:00) (90% - 99%)    PHYSICAL EXAM:  GENERAL: NAD  NECK: Supple, No JVD  CHEST/LUNG: Few crackles at right base  HEART: S1, S2, Regular rate and rhythm;   ABDOMEN: Soft, Nontender, Nondistended; Bowel sounds present  EXTREMITIES: + edema  SKIN: thin fragile skin, RLE wound covered    LABS:  Labs:                        7.7    11.05 )-----------( 384      ( 13 Mar 2018 04:47 )             25.3             03-13    139  |  101  |  34<H>  ----------------------------<  266<H>  4.3   |  31  |  1.3    Ca    8.8      13 Mar 2018 04:47  Mg     2.4     03-13    TPro  5.3<L>  /  Alb  2.8<L>  /  TBili  1.9<H>  /  DBili  0.7<H>  /  AST  51<H>  /  ALT  77<H>  /  AlkPhos  134<H>  03-13    LIVER FUNCTIONS - ( 13 Mar 2018 04:47 )  Alb: 2.8 g/dL / Pro: 5.3 g/dL / ALK PHOS: 134 U/L / ALT: 77 U/L / AST: 51 U/L / GGT: x                 PT/INR - ( 13 Mar 2018 04:47 )   PT: 14.20 sec;   INR: 1.31 ratio         PTT - ( 12 Mar 2018 04:54 )  PTT:22.7 sec          Culture - Fungal, Bronchial (03.09.18 @ 09:30)    Specimen Source: .Broncial BAL    Culture Results:   Few-moderate Yeast    Culture - Bronchial (03.09.18 @ 09:30)    -  Vancomycin: S 2    Gram Stain:   Few polymorphonuclear leukocytes per low power field  Moderate Squamous epithelial cells per low power field  Few Yeast like cells per oil power field  Moderate Gram Positive Cocci in Clusters per oil power field  Few Gram Negative Diplococci per oil power field  Rare Gram Negative Rods per oil power field    -  Ampicillin/Sulbactam: R 16/8    -  Cefazolin: R 8    -  Ciprofloxacin: R >2    -  Clindamycin: R <=0.25    -  Erythromycin: R >4    -  Gentamicin: S 2    -  Levofloxacin: R >4    -  Linezolid: S 2    -  Moxifloxacin(Aerobic): R 2    -  Oxacillin: R >2    -  Penicillin: R >8    -  RIF- Rifampin: S <=1    -  Tetra/Doxy: S 4    -  Trimethoprim/Sulfamethoxazole: S <=0.5/9.5    Specimen Source: Bronch Wash BAL    Culture Results:   Moderate Methicillin resistant Staphylococcus aureus  Normal Respiratory Nikki present    Organism Identification: Methicillin resistant Staphylococcus aureus    Organism: Methicillin resistant Staphylococcus aureus    Method Type: EROS            RADIOLOGY & ADDITIONAL TESTS:

## 2018-03-14 LAB
ALBUMIN SERPL ELPH-MCNC: 3 G/DL — SIGNIFICANT CHANGE UP (ref 3–5.5)
ALP SERPL-CCNC: 143 U/L — HIGH (ref 30–115)
ALT FLD-CCNC: 80 U/L — HIGH (ref 0–41)
ANA TITR SER: NEGATIVE — SIGNIFICANT CHANGE UP
ANION GAP SERPL CALC-SCNC: 12 MMOL/L — SIGNIFICANT CHANGE UP (ref 7–14)
APTT BLD: 22.2 SEC — CRITICAL LOW (ref 27–39.2)
AST SERPL-CCNC: 37 U/L — SIGNIFICANT CHANGE UP (ref 0–41)
BILIRUB DIRECT SERPL-MCNC: 0.9 MG/DL — HIGH (ref 0–0.2)
BILIRUB INDIRECT FLD-MCNC: 1.5 MG/DL — SIGNIFICANT CHANGE UP
BILIRUB SERPL-MCNC: 2.4 MG/DL — HIGH (ref 0.2–1.2)
BUN SERPL-MCNC: 29 MG/DL — HIGH (ref 10–20)
CALCIUM SERPL-MCNC: 8.9 MG/DL — SIGNIFICANT CHANGE UP (ref 8.5–10.1)
CHLORIDE SERPL-SCNC: 99 MMOL/L — SIGNIFICANT CHANGE UP (ref 98–110)
CO2 SERPL-SCNC: 27 MMOL/L — SIGNIFICANT CHANGE UP (ref 17–32)
CREAT SERPL-MCNC: 1 MG/DL — SIGNIFICANT CHANGE UP (ref 0.7–1.5)
GLUCOSE SERPL-MCNC: 242 MG/DL — HIGH (ref 70–110)
HCT VFR BLD CALC: 28.3 % — LOW (ref 37–47)
HGB BLD-MCNC: 8.6 G/DL — LOW (ref 12–16)
INR BLD: 1.33 RATIO — HIGH (ref 0.65–1.3)
MAGNESIUM SERPL-MCNC: 2.3 MG/DL — SIGNIFICANT CHANGE UP (ref 1.8–2.4)
MCHC RBC-ENTMCNC: 22.6 PG — LOW (ref 27–31)
MCHC RBC-ENTMCNC: 30.4 G/DL — LOW (ref 32–37)
MCV RBC AUTO: 74.5 FL — LOW (ref 81–99)
NRBC # BLD: 0 /100 WBCS — SIGNIFICANT CHANGE UP (ref 0–0)
PLATELET # BLD AUTO: 403 K/UL — HIGH (ref 130–400)
POTASSIUM SERPL-MCNC: 4.1 MMOL/L — SIGNIFICANT CHANGE UP (ref 3.5–5)
POTASSIUM SERPL-SCNC: 4.1 MMOL/L — SIGNIFICANT CHANGE UP (ref 3.5–5)
PROT SERPL-MCNC: 5.6 G/DL — LOW (ref 6–8)
PROTHROM AB SERPL-ACNC: 14.5 SEC — HIGH (ref 9.95–12.87)
RBC # BLD: 3.8 M/UL — LOW (ref 4.2–5.4)
RBC # FLD: 18.8 % — HIGH (ref 11.5–14.5)
SODIUM SERPL-SCNC: 138 MMOL/L — SIGNIFICANT CHANGE UP (ref 135–146)
WBC # BLD: 14.85 K/UL — HIGH (ref 4.8–10.8)
WBC # FLD AUTO: 14.85 K/UL — HIGH (ref 4.8–10.8)

## 2018-03-14 RX ORDER — ZINC OXIDE 200 MG/G
1 OINTMENT TOPICAL
Qty: 0 | Refills: 0 | Status: DISCONTINUED | OUTPATIENT
Start: 2018-03-14 | End: 2018-03-14

## 2018-03-14 RX ORDER — MONTELUKAST 4 MG/1
10 TABLET, CHEWABLE ORAL AT BEDTIME
Qty: 0 | Refills: 0 | Status: DISCONTINUED | OUTPATIENT
Start: 2018-03-14 | End: 2018-03-23

## 2018-03-14 RX ORDER — HEPARIN SODIUM 5000 [USP'U]/ML
5000 INJECTION INTRAVENOUS; SUBCUTANEOUS EVERY 8 HOURS
Qty: 0 | Refills: 0 | Status: DISCONTINUED | OUTPATIENT
Start: 2018-03-14 | End: 2018-03-23

## 2018-03-14 RX ORDER — INSULIN GLARGINE 100 [IU]/ML
10 INJECTION, SOLUTION SUBCUTANEOUS AT BEDTIME
Qty: 0 | Refills: 0 | Status: DISCONTINUED | OUTPATIENT
Start: 2018-03-14 | End: 2018-03-23

## 2018-03-14 RX ORDER — INSULIN LISPRO 100/ML
4 VIAL (ML) SUBCUTANEOUS ONCE
Qty: 0 | Refills: 0 | Status: COMPLETED | OUTPATIENT
Start: 2018-03-14 | End: 2018-03-14

## 2018-03-14 RX ADMIN — Medication 3 MILLILITER(S): at 13:38

## 2018-03-14 RX ADMIN — Medication 650 MILLIGRAM(S): at 22:53

## 2018-03-14 RX ADMIN — Medication 3 MILLILITER(S): at 19:30

## 2018-03-14 RX ADMIN — Medication 25 MILLIGRAM(S): at 17:06

## 2018-03-14 RX ADMIN — PANTOPRAZOLE SODIUM 40 MILLIGRAM(S): 20 TABLET, DELAYED RELEASE ORAL at 05:56

## 2018-03-14 RX ADMIN — MICAFUNGIN SODIUM 105 MILLIGRAM(S): 100 INJECTION, POWDER, LYOPHILIZED, FOR SOLUTION INTRAVENOUS at 11:35

## 2018-03-14 RX ADMIN — INSULIN GLARGINE 10 UNIT(S): 100 INJECTION, SOLUTION SUBCUTANEOUS at 22:52

## 2018-03-14 RX ADMIN — Medication 5 UNIT(S): at 12:46

## 2018-03-14 RX ADMIN — TACROLIMUS 0.5 MILLIGRAM(S): 5 CAPSULE ORAL at 21:57

## 2018-03-14 RX ADMIN — LINEZOLID 300 MILLIGRAM(S): 600 INJECTION, SOLUTION INTRAVENOUS at 17:05

## 2018-03-14 RX ADMIN — Medication 60 MILLIGRAM(S): at 17:05

## 2018-03-14 RX ADMIN — MONTELUKAST 10 MILLIGRAM(S): 4 TABLET, CHEWABLE ORAL at 21:56

## 2018-03-14 RX ADMIN — LINEZOLID 300 MILLIGRAM(S): 600 INJECTION, SOLUTION INTRAVENOUS at 05:56

## 2018-03-14 RX ADMIN — Medication 5 UNIT(S): at 16:57

## 2018-03-14 RX ADMIN — Medication 80 MILLIGRAM(S): at 05:56

## 2018-03-14 RX ADMIN — MONTELUKAST 10 MILLIGRAM(S): 4 TABLET, CHEWABLE ORAL at 11:15

## 2018-03-14 RX ADMIN — Medication 25 MILLIGRAM(S): at 05:55

## 2018-03-14 RX ADMIN — Medication 4 UNIT(S): at 21:50

## 2018-03-14 RX ADMIN — Medication 3 MILLILITER(S): at 07:37

## 2018-03-14 RX ADMIN — Medication 5 UNIT(S): at 08:26

## 2018-03-14 RX ADMIN — HEPARIN SODIUM 5000 UNIT(S): 5000 INJECTION INTRAVENOUS; SUBCUTANEOUS at 21:56

## 2018-03-14 NOTE — CONSULT NOTE ADULT - SUBJECTIVE AND OBJECTIVE BOX
HPI:  Pt states she fell Monday and went for evaluation to [facility] and laceration was sutured and pt was discharged. Pt came back today for increasing pain in right LE. Patient denies calf pain, fevers, chest pain, SOB, abdominal pain, nausea, vomiting, diarrhea, dizziness, weakness. (27 Feb 2018 13:32)      PAST MEDICAL & SURGICAL HISTORY:  Essential hypertension  Autoimmune hepatitis treated with steroids  Asthma  DVT (deep venous thrombosis)  No significant past surgical history      Hospital Course:    TODAY'S SUBJECTIVE & REVIEW OF SYMPTOMS:     Constitutional WNL   Cardio WNL   Resp WNL   GI WNL  Heme WNL  Endo WNL  Skin WNL  MSK WNL  Neuro WNL  Cognitive WNL  Psych WNL      MEDICATIONS  (STANDING):  ALBUTerol    90 MICROgram(s) HFA Inhaler 1 Puff(s) Inhalation every 4 hours  ALBUTerol/ipratropium for Nebulization 3 milliLiter(s) Nebulizer every 6 hours  chlorhexidine 4% Liquid 1 Application(s) Topical daily  dextrose 50% Injectable 12.5 Gram(s) IV Push once  dextrose 50% Injectable 25 Gram(s) IV Push once  dextrose 50% Injectable 25 Gram(s) IV Push once  docusate sodium 100 milliGRAM(s) Oral three times a day  insulin lispro Injectable (HumaLOG) 5 Unit(s) SubCutaneous before breakfast  insulin lispro Injectable (HumaLOG) 5 Unit(s) SubCutaneous before lunch  insulin lispro Injectable (HumaLOG) 5 Unit(s) SubCutaneous before dinner  linezolid  IVPB      linezolid  IVPB 600 milliGRAM(s) IV Intermittent every 12 hours  methylPREDNISolone sodium succinate Injectable 60 milliGRAM(s) IV Push every 12 hours  metoprolol     tartrate 25 milliGRAM(s) Oral two times a day  micafungin IVPB      micafungin IVPB 100 milliGRAM(s) IV Intermittent every 24 hours  montelukast 10 milliGRAM(s) Oral daily  pantoprazole    Tablet 40 milliGRAM(s) Oral before breakfast  senna 2 Tablet(s) Oral at bedtime  tacrolimus 0.5 milliGRAM(s) Oral at bedtime  tiotropium 18 MICROgram(s) Capsule 1 Capsule(s) Inhalation daily  zinc oxide 40% Ointment 1 Application(s) Topical two times a day    MEDICATIONS  (PRN):  acetaminophen   Tablet. 650 milliGRAM(s) Oral every 6 hours PRN Moderate Pain (4 - 6)  dextrose Gel 1 Dose(s) Oral once PRN Blood Glucose LESS THAN 70 milliGRAM(s)/deciliter  glucagon  Injectable 1 milliGRAM(s) IntraMuscular once PRN Glucose LESS THAN 70 milligrams/deciliter  morphine  - Injectable 2 milliGRAM(s) IV Push every 4 hours PRN Moderate Pain (4 - 6)  ondansetron Injectable 4 milliGRAM(s) IV Push every 8 hours PRN Nausea and/or Vomiting  polyethylene glycol 3350 17 Gram(s) Oral daily PRN Constipation      FAMILY HISTORY:  No pertinent family history in first degree relatives      Allergies    No Known Allergies    Intolerances        SOCIAL HISTORY:    [  ] Etoh  [  ] Smoking  [  ] Substance abuse     Home Environment:  [  ] Home Alone  [ x ] Lives with Family  [  ] Home Health Aid    Dwelling:  [  ] Apartment  [ x ] Private House  [  ] Adult Home  [  ] Skilled Nursing Facility      [  ] Short Term  [  ] Long Term  [ x ] Stairs       Elevator [  ]    FUNCTIONAL STATUS PTA: (Check all that apply)  Ambulation: [  x ]Independent    [  ] Dependent     [  ] Non-Ambulatory  Assistive Device: [  ] SA Cane  [  ]  Q Cane  [  ] Walker  [  ]  Wheelchair  ADL : [x  ] Independent  [  ]  Dependent       Vital Signs Last 24 Hrs  T(C): 35.7 (14 Mar 2018 13:11), Max: 36.6 (13 Mar 2018 20:00)  T(F): 96.2 (14 Mar 2018 13:11), Max: 97.9 (13 Mar 2018 20:00)  HR: 90 (14 Mar 2018 13:11) (64 - 90)  BP: 160/70 (14 Mar 2018 13:11) (135/62 - 160/70)  BP(mean): 128 (13 Mar 2018 22:00) (91 - 128)  RR: 19 (14 Mar 2018 13:11) (18 - 37)  SpO2: 95% (13 Mar 2018 22:00) (90% - 97%)      PHYSICAL EXAM: Alert & Oriented X3  GENERAL: NAD, well-groomed, well-developed  HEAD:  Atraumatic, Normocephalic  NECK: Supple, No JVD, Normal thyroid  CHEST/LUNG: Clear   HEART: Regular   ABDOMEN: Soft  EXTREMITIES:  no left calf tenderness    NERVOUS SYSTEM:  Cranial Nerves 2-12 intact [  ] Abnormal  [  ]  ROM: WFL all extremities [ x ]  Abnormal [  ]  Motor Strength: WFL all extremities  [x  ]  Abnormal [  ]  Sensation: intact to light touch [x  ] Abnormal [  ]  Reflexes: Symmetric [  ]  Abnormal [  ]    FUNCTIONAL STATUS:  Bed Mobility: Independent [  ]  Supervision [  ]  Needs Assistance [x  ]  N/A [  ]  Transfers: Independent [  ]  Supervision [  ]  Needs Assistance [x  ]  N/A [  ]   Ambulation: Independent [  ]  Supervision [  ]  Needs Assistance [  ]  N/A [  ]  ADL: Independent [  ] Requires Assistance [  ] N/A [  ]      LABS:                        8.6    14.85 )-----------( 403      ( 14 Mar 2018 08:44 )             28.3     03-14    138  |  99  |  29<H>  ----------------------------<  242<H>  4.1   |  27  |  1.0    Ca    8.9      14 Mar 2018 08:44  Mg     2.3     03-14    TPro  5.6<L>  /  Alb  3.0  /  TBili  2.4<H>  /  DBili  0.9<H>  /  AST  37  /  ALT  80<H>  /  AlkPhos  143<H>  03-14    PT/INR - ( 14 Mar 2018 08:44 )   PT: 14.50 sec;   INR: 1.33 ratio         PTT - ( 14 Mar 2018 08:44 )  PTT:22.2 sec      RADIOLOGY & ADDITIONAL STUDIES:    Assesment:

## 2018-03-14 NOTE — PROGRESS NOTE ADULT - ASSESSMENT
IMPRESSION:    AHRF improved  DAH   MRSA pneumonia?   HO autoimmune hepatitis     PLAN:    CNS: no depressants    HEENT: Oral care    PULMONARY:  HOB @ 45 degrees.  Wean O2.  Decrease Solumedrol to 60 mg Q12     CARDIOVASCULAR:  I = O    GI: GI prophylaxis.  Feeding      RENAL:  Follow up lytes.  Correct as needed    INFECTIOUS DISEASE: No need for antifungal from pulmonary stand point.       HEMATOLOGICAL:  DVT prophylaxis.    ENDOCRINE:  Follow up FS.  Insulin protocol if needed.    MUSCULOSKELETAL:    Continue Solumedrol.     Possible restart Prograf today

## 2018-03-14 NOTE — PROGRESS NOTE ADULT - ATTENDING COMMENTS
Aggressive multispecialty care & full code at this time;
Ongoing care d/w patient. Questions addressed
Attending Statement: I have personally performed a face to face diagnostic evaluation on this patient. I have reviewed the above note and agree with the history, exam and plan of care, except as I have noted in the text.
Attending Statement: I have personally performed a face to face diagnostic evaluation on this patient. I have reviewed the above note and agree with the history, exam and plan of care, except as I have noted in the text.
Chart reviewed, patient examined. Pertinent results reviewed.  Case discussed with HO  consults reviewed    PE- as noted  Very weak, barely able to stand w 2P assist;  RLE wound seen-debrided, clean-R lower [id number] cm; L upper-4x3 cm  Lungs-Bibasilar crackles    Continue steroids w taper; continue Antibiotic; hold AC few more days-INR still slightly > nl;   DVT & GI Prophylaxis-OK

## 2018-03-14 NOTE — PROGRESS NOTE ADULT - ASSESSMENT
PNA.  BAL positive for ORSA, few Yeast ( which could well be a colonizer).  Zyvox 600mg iv q12h.  Micafungin 100mg iv q24h.

## 2018-03-14 NOTE — PROGRESS NOTE ADULT - SUBJECTIVE AND OBJECTIVE BOX
DARNELL, DONI  73y, Female      OVERNIGHT EVENTS:    no fever, feels well, no cough, minimal SOB.    VITALS:  T(F): 97.4, Max: 98.9 (03-13-18 @ 12:00)  HR: 79  BP: 148/67  RR: 18Vital Signs Last 24 Hrs  T(C): 36.3 (14 Mar 2018 05:46), Max: 37.2 (13 Mar 2018 12:00)  T(F): 97.4 (14 Mar 2018 05:46), Max: 98.9 (13 Mar 2018 12:00)  HR: 79 (14 Mar 2018 05:46) (64 - 96)  BP: 148/67 (14 Mar 2018 05:46) (113/65 - 157/82)  BP(mean): 128 (13 Mar 2018 22:00) (85 - 128)  RR: 18 (14 Mar 2018 05:46) (18 - 41)  SpO2: 95% (13 Mar 2018 22:00) (90% - 98%)    TESTS & MEASUREMENTS:                        7.7    11.05 )-----------( 384      ( 13 Mar 2018 04:47 )             25.3     03-13    139  |  101  |  34<H>  ----------------------------<  266<H>  4.3   |  31  |  1.3    Ca    8.8      13 Mar 2018 04:47  Mg     2.4     03-13    TPro  5.3<L>  /  Alb  2.8<L>  /  TBili  1.9<H>  /  DBili  0.7<H>  /  AST  51<H>  /  ALT  77<H>  /  AlkPhos  134<H>  03-13    LIVER FUNCTIONS - ( 13 Mar 2018 04:47 )  Alb: 2.8 g/dL / Pro: 5.3 g/dL / ALK PHOS: 134 U/L / ALT: 77 U/L / AST: 51 U/L / GGT: x             Culture - Fungal, Bronchial (collected 03-09-18 @ 09:30)  Source: .Broncial BAL  Preliminary Report (03-12-18 @ 12:07):    Few-moderate Yeast    Culture - Bronchial (collected 03-09-18 @ 09:30)  Source: Bronch Wash BAL  Gram Stain (03-09-18 @ 23:58):    Few polymorphonuclear leukocytes per low power field    Moderate Squamous epithelial cells per low power field    Few Yeast like cells per oil power field    Moderate Gram Positive Cocci in Clusters per oil power field    Few Gram Negative Diplococci per oil power field    Rare Gram Negative Rods per oil power field  Final Report (03-11-18 @ 16:58):    Moderate Methicillin resistant Staphylococcus aureus    Normal Respiratory Nikki present  Organism: Methicillin resistant Staphylococcus aureus (03-11-18 @ 16:58)  Organism: Methicillin resistant Staphylococcus aureus (03-11-18 @ 16:58)      -  Ampicillin/Sulbactam: R 16/8      -  Cefazolin: R 8      -  Ciprofloxacin: R >2      -  Clindamycin: R <=0.25      -  Erythromycin: R >4      -  Gentamicin: S 2      -  Levofloxacin: R >4      -  Linezolid: S 2      -  Moxifloxacin(Aerobic): R 2      -  Oxacillin: R >2      -  Penicillin: R >8      -  RIF- Rifampin: S <=1      -  Tetra/Doxy: S 4      -  Trimethoprim/Sulfamethoxazole: S <=0.5/9.5      -  Vancomycin: S 2      Method Type: EROS    Culture - Acid Fast - Bronchial w/Smear (collected 03-09-18 @ 09:30)  Source: .Broncial Bronchoalveolar Lavage            RADIOLOGY & ADDITIONAL TESTS:    ANTIBIOTICS:  linezolid  IVPB      linezolid  IVPB 600 milliGRAM(s) IV Intermittent every 12 hours  micafungin IVPB 100 milliGRAM(s) IV Intermittent every 24 hours  micafungin IVPB

## 2018-03-14 NOTE — PROGRESS NOTE ADULT - SUBJECTIVE AND OBJECTIVE BOX
SUBJECTIVE:  Feels and looks better.  No SOB at rest.  On NC O2.  Nlo fevers      REVIEW OF SYSTEMS:  See HPI    PHYSICAL EXAM  Vital Signs Last 24 Hrs  T(C): 36.3 (14 Mar 2018 05:46), Max: 37.2 (13 Mar 2018 12:00)  T(F): 97.4 (14 Mar 2018 05:46), Max: 98.9 (13 Mar 2018 12:00)  HR: 79 (14 Mar 2018 05:46) (64 - 96)  BP: 148/67 (14 Mar 2018 05:46) (135/62 - 157/82)  BP(mean): 128 (13 Mar 2018 22:00) (85 - 128)  RR: 18 (14 Mar 2018 05:46) (18 - 41)  SpO2: 95% (13 Mar 2018 22:00) (90% - 98%)    General: In NAD  HEENT: LUIS A               Lymph Nodes: No Cervical LN    Lungs: [place] BS  Cardiovascular: Regular  Abdomen: Soft. + BS  Extremities: No clubbing   Skin: Warm  Neurological: Non focal      03-13-18 @ 07:01  -  03-14-18 @ 07:00  --------------------------------------------------------  IN:    IV PiggyBack: 700 mL  Total IN: 700 mL    OUT:    Stool: 2 mL    Voided: 400 mL  Total OUT: 402 mL    Total NET: 298 mL          LABS:                          7.7    11.05 )-----------( 384      ( 13 Mar 2018 04:47 )             25.3                                               03-13    139  |  101  |  34<H>  ----------------------------<  266<H>  4.3   |  31  |  1.3    Ca    8.8      13 Mar 2018 04:47  Mg     2.4     03-13    TPro  5.3<L>  /  Alb  2.8<L>  /  TBili  1.9<H>  /  DBili  0.7<H>  /  AST  51<H>  /  ALT  77<H>  /  AlkPhos  134<H>  03-13      PT/INR - ( 13 Mar 2018 04:47 )   PT: 14.20 sec;   INR: 1.31 ratio                                                                                              LIVER FUNCTIONS - ( 13 Mar 2018 04:47 )  Alb: 2.8 g/dL / Pro: 5.3 g/dL / ALK PHOS: 134 U/L / ALT: 77 U/L / AST: 51 U/L / GGT: x                                                                                                MEDICATIONS  (STANDING):  ALBUTerol    90 MICROgram(s) HFA Inhaler 1 Puff(s) Inhalation every 4 hours  ALBUTerol/ipratropium for Nebulization 3 milliLiter(s) Nebulizer every 6 hours  chlorhexidine 4% Liquid 1 Application(s) Topical daily  dextrose 50% Injectable 12.5 Gram(s) IV Push once  dextrose 50% Injectable 25 Gram(s) IV Push once  dextrose 50% Injectable 25 Gram(s) IV Push once  docusate sodium 100 milliGRAM(s) Oral three times a day  insulin lispro Injectable (HumaLOG) 5 Unit(s) SubCutaneous before breakfast  insulin lispro Injectable (HumaLOG) 5 Unit(s) SubCutaneous before lunch  insulin lispro Injectable (HumaLOG) 5 Unit(s) SubCutaneous before dinner  linezolid  IVPB      linezolid  IVPB 600 milliGRAM(s) IV Intermittent every 12 hours  methylPREDNISolone sodium succinate Injectable 80 milliGRAM(s) IV Push every 6 hours  metoprolol     tartrate 25 milliGRAM(s) Oral two times a day  micafungin IVPB 100 milliGRAM(s) IV Intermittent every 24 hours  micafungin IVPB      montelukast 10 milliGRAM(s) Oral daily  pantoprazole    Tablet 40 milliGRAM(s) Oral before breakfast  senna 2 Tablet(s) Oral at bedtime  tacrolimus 0.5 milliGRAM(s) Oral at bedtime  tiotropium 18 MICROgram(s) Capsule 1 Capsule(s) Inhalation daily    MEDICATIONS  (PRN):  acetaminophen   Tablet. 650 milliGRAM(s) Oral every 6 hours PRN Moderate Pain (4 - 6)  dextrose Gel 1 Dose(s) Oral once PRN Blood Glucose LESS THAN 70 milliGRAM(s)/deciliter  glucagon  Injectable 1 milliGRAM(s) IntraMuscular once PRN Glucose LESS THAN 70 milligrams/deciliter  morphine  - Injectable 2 milliGRAM(s) IV Push every 4 hours PRN Moderate Pain (4 - 6)  ondansetron Injectable 4 milliGRAM(s) IV Push every 8 hours PRN Nausea and/or Vomiting  polyethylene glycol 3350 17 Gram(s) Oral daily PRN Constipation

## 2018-03-14 NOTE — CONSULT NOTE ADULT - ASSESSMENT

## 2018-03-14 NOTE — PROGRESS NOTE ADULT - ASSESSMENT
72 yo female with hx of HTN, autoimmune hepatitis, asthma, DVT p/w persistent pain s/p surgical wound closure at [facility], now s/p debridement with burn team. Pt was transferred to medicine 3/12 acute hypoxemic RF. Her problems are being managed as follows --      Acute Respiratory Failure,  Pulmonary Edema vs Interstitial lung disease, PNA   - c/w O2 therapy   - Pulm following   - c/w lasix and steroids   - c/w abx, Zyvox and Micafungin     #Hx of DVT and PE   - Pt has heterozygous prothrombin gene mutation and cause of unprovoked DVT and PE in March 2017.    - INR remains supratherapeutic     - Coumadin on hold   - continue to monitor daily INR's    Right leg laceration s/p debridement   - contact isolation for MRSA   - wound care as per Burn    HTN   - continue Norvasc and Metoprolol   - monitor BP    Nutritional support  GI prophylaxsis 72 yo female with hx of HTN, autoimmune hepatitis on tacrolimus, h/o PCP pneumonia, asthma, prothrombin gene mutation/DVT in 3/2017, p/w persistent pain s/p surgical wound closure after sustaining mechanical fall at home at [facility], admitted for worsening wound infection who was initially admitted under medical service s/p debridement. Her hospital course was complicated by acute hypoxic RF requiring ICU admission; s/p BAL thought to be 2/2 ORSA/fungal PNA. She was downgraded to the medical floors 3/13 and her problems are being managed as follows --      #Acute hypoxic Respiratory Failure, working diagnosis is 2/2 DAH and PNA (BAL on 3/9 revealing ORSA and yeast)   - c/w O2 therapy, attempt to wean and if cannot will do ambulatory O2/home O2  - Pulm following s/p downgrade from ICU , f/u recs  - c/w steroids  - c/w abx, Zyvox and Micafungin (D2 of each)    #Hypercoagulability 2/2 prothrombin mutation, h/o DVT and PE  - Coumadin on hold 2/2 DAH  - continue to monitor daily INR's    #Right leg laceration s/p debridement 2/28  - contact isolation for MRSA  - wound care as per Burn    #HTN  - continue Norvasc and Metoprolol  - monitor BP    Nutritional support  GI prophylaxsis 74 yo female with hx of HTN, autoimmune hepatitis on tacrolimus, h/o PCP pneumonia, asthma, prothrombin gene mutation/DVT in 3/2017, p/w persistent pain s/p surgical wound closure after sustaining mechanical fall at home at [facility], admitted for worsening wound infection who was initially admitted under medical service s/p debridement. Her hospital course was complicated by acute hypoxic RF requiring ICU admission; s/p BAL thought to be 2/2 ORSA/fungal PNA. She was downgraded to the medical floors 3/13 and her problems are being managed as follows --      #Acute hypoxic Respiratory Failure, working diagnosis is 2/2 DAH and PNA (BAL on 3/9 revealing ORSA and yeast)   - c/w O2 therapy, attempt to wean once more stable and if cannot will do ambulatory O2/home O2  - Pulm following s/p downgrade from ICU , appreciate recs   - c/w steroids, tapering today to 60mg q12h  - c/w inhalers, nebs, montelukast  - ID following, appreciate recs  - c/w abx, Zyvox and Micafungin (D2 of each)    #Hypercoagulability 2/2 prothrombin mutation, h/o DVT and PE 3/2017  - Coumadin on hold 2/2 DAH  - continue to monitor daily INR  - resume coumadin once cleared by pulm    #Right leg laceration s/p debridement 2/28  - local wound care per burn    #HTN, stable  - continue Norvasc and Metoprolol    #AIH   - GI following, appreciate recs  - f/u if OK to resume prograf  - already on high dose steroid for pulm dx    #Diet: low sodium     #Activity: OOB as tolerated    #DVT/GI ppx: on hold in setting of DAH / ppi    #Dispo: pending resolution of PNA and improvement of pulm sxs, will assess ambulatory status and need for PT services 74 yo female with hx of HTN, autoimmune hepatitis on tacrolimus, h/o PCP pneumonia, asthma, prothrombin gene mutation/DVT in 3/2017, p/w persistent pain s/p surgical wound closure after sustaining mechanical fall at home at [facility], admitted for worsening wound infection who was initially admitted under medical service s/p debridement. Her hospital course was complicated by acute hypoxic RF requiring ICU admission; s/p BAL thought to be 2/2 ORSA/fungal PNA. She was downgraded to the medical floors 3/13 and her problems are being managed as follows --      #Acute hypoxic Respiratory Failure, working diagnosis is 2/2 DAH and PNA (BAL on 3/9 revealing ORSA and yeast)   - c/w O2 therapy, attempt to wean once more stable and if cannot will do ambulatory O2/home O2  - Pulm following s/p downgrade from ICU , appreciate recs   - c/w steroids, tapering today to 60mg q12h  - c/w inhalers, nebs, montelukast  - ID following, appreciate recs  - c/w abx, Zyvox and Micafungin (D2 of each)  - Rheum initially c/s for ? autoimmune etiology for DAH - spoke w/ Dr. Goff who reviewed chart and felt dx is not autoimmune in etiology    #Hypercoagulability 2/2 prothrombin mutation, h/o DVT and PE 3/2017  - Coumadin on hold 2/2 DAH  - continue to monitor daily INR  - resume coumadin once cleared by pulm    #Right leg laceration s/p debridement 2/28  - local wound care per burn    #HTN, stable  - continue Norvasc and Metoprolol    #AIH   - GI following, appreciate recs  - f/u if OK to resume prograf  - already on high dose steroid for pulm dx    #Diet: low sodium     #Activity: OOB as tolerated    #DVT/GI ppx: on hold in setting of DAH / ppi    #Dispo: pending resolution of PNA and improvement of pulm sxs, will assess ambulatory status and need for PT services

## 2018-03-14 NOTE — PROGRESS NOTE ADULT - SUBJECTIVE AND OBJECTIVE BOX
Patient is a 73y old  Female who presents with a chief complaint of right leg wound, needs debridement (04 Mar 2018 15:38)      PAST MEDICAL & SURGICAL HISTORY:  Essential hypertension  Autoimmune hepatitis treated with steroids  Asthma  DVT (deep venous thrombosis)  No significant past surgical history      MEDICATIONS  (STANDING):  ALBUTerol    90 MICROgram(s) HFA Inhaler 1 Puff(s) Inhalation every 4 hours  ALBUTerol/ipratropium for Nebulization 3 milliLiter(s) Nebulizer every 6 hours  chlorhexidine 4% Liquid 1 Application(s) Topical daily  dextrose 50% Injectable 12.5 Gram(s) IV Push once  dextrose 50% Injectable 25 Gram(s) IV Push once  dextrose 50% Injectable 25 Gram(s) IV Push once  docusate sodium 100 milliGRAM(s) Oral three times a day  insulin lispro Injectable (HumaLOG) 5 Unit(s) SubCutaneous before breakfast  insulin lispro Injectable (HumaLOG) 5 Unit(s) SubCutaneous before lunch  insulin lispro Injectable (HumaLOG) 5 Unit(s) SubCutaneous before dinner  linezolid  IVPB      linezolid  IVPB 600 milliGRAM(s) IV Intermittent every 12 hours  methylPREDNISolone sodium succinate Injectable 80 milliGRAM(s) IV Push every 6 hours  metoprolol     tartrate 25 milliGRAM(s) Oral two times a day  micafungin IVPB 100 milliGRAM(s) IV Intermittent every 24 hours  micafungin IVPB      montelukast 10 milliGRAM(s) Oral daily  pantoprazole    Tablet 40 milliGRAM(s) Oral before breakfast  senna 2 Tablet(s) Oral at bedtime  tacrolimus 0.5 milliGRAM(s) Oral at bedtime  tiotropium 18 MICROgram(s) Capsule 1 Capsule(s) Inhalation daily    MEDICATIONS  (PRN):  acetaminophen   Tablet. 650 milliGRAM(s) Oral every 6 hours PRN Moderate Pain (4 - 6)  dextrose Gel 1 Dose(s) Oral once PRN Blood Glucose LESS THAN 70 milliGRAM(s)/deciliter  glucagon  Injectable 1 milliGRAM(s) IntraMuscular once PRN Glucose LESS THAN 70 milligrams/deciliter  morphine  - Injectable 2 milliGRAM(s) IV Push every 4 hours PRN Moderate Pain (4 - 6)  ondansetron Injectable 4 milliGRAM(s) IV Push every 8 hours PRN Nausea and/or Vomiting  polyethylene glycol 3350 17 Gram(s) Oral daily PRN Constipation      Overnight events:    Vital Signs Last 24 Hrs  T(C): 36.3 (14 Mar 2018 05:46), Max: 37.2 (13 Mar 2018 12:00)  T(F): 97.4 (14 Mar 2018 05:46), Max: 98.9 (13 Mar 2018 12:00)  HR: 79 (14 Mar 2018 05:46) (64 - 96)  BP: 148/67 (14 Mar 2018 05:46) (113/65 - 157/82)  BP(mean): 128 (13 Mar 2018 22:00) (85 - 128)  RR: 18 (14 Mar 2018 05:46) (18 - 41)  SpO2: 95% (13 Mar 2018 22:00) (90% - 98%)  CAPILLARY BLOOD GLUCOSE  256 (14 Mar 2018 07:32)  279 (13 Mar 2018 22:00)  292 (13 Mar 2018 16:30)  300 (13 Mar 2018 11:00)        I&O's Summary    13 Mar 2018 07:01  -  14 Mar 2018 07:00  --------------------------------------------------------  IN: 700 mL / OUT: 402 mL / NET: 298 mL        Physical Exam:    -     General : Lying in bed in no acute distress    -      HEENT: NCAT    -      Cardiac: RRR    -      Pulm: CTA BL     -      GI: soft NTND    -      Musculoskeletal: nl ROM, no edema/cyanosis    -      Neuro: A&O x3, no focal deficits        Labs:                        7.7    11.05 )-----------( 384      ( 13 Mar 2018 04:47 )             25.3             03-13    139  |  101  |  34<H>  ----------------------------<  266<H>  4.3   |  31  |  1.3    Ca    8.8      13 Mar 2018 04:47  Mg     2.4     03-13    TPro  5.3<L>  /  Alb  2.8<L>  /  TBili  1.9<H>  /  DBili  0.7<H>  /  AST  51<H>  /  ALT  77<H>  /  AlkPhos  134<H>  03-13    LIVER FUNCTIONS - ( 13 Mar 2018 04:47 )  Alb: 2.8 g/dL / Pro: 5.3 g/dL / ALK PHOS: 134 U/L / ALT: 77 U/L / AST: 51 U/L / GGT: x                 PT/INR - ( 13 Mar 2018 04:47 )   PT: 14.20 sec;   INR: 1.31 ratio                       Imaging:    ECG: NAOE. No new concerns or complaints. Endorsing she wants to go home.    Patient is a 73y old  Female who presents with a chief complaint of right leg wound, needs debridement (04 Mar 2018 15:38)      PAST MEDICAL & SURGICAL HISTORY:  Essential hypertension  Autoimmune hepatitis treated with steroids  Asthma  DVT (deep venous thrombosis)  No significant past surgical history      MEDICATIONS  (STANDING):  ALBUTerol    90 MICROgram(s) HFA Inhaler 1 Puff(s) Inhalation every 4 hours  ALBUTerol/ipratropium for Nebulization 3 milliLiter(s) Nebulizer every 6 hours  chlorhexidine 4% Liquid 1 Application(s) Topical daily  dextrose 50% Injectable 12.5 Gram(s) IV Push once  dextrose 50% Injectable 25 Gram(s) IV Push once  dextrose 50% Injectable 25 Gram(s) IV Push once  docusate sodium 100 milliGRAM(s) Oral three times a day  insulin lispro Injectable (HumaLOG) 5 Unit(s) SubCutaneous before breakfast  insulin lispro Injectable (HumaLOG) 5 Unit(s) SubCutaneous before lunch  insulin lispro Injectable (HumaLOG) 5 Unit(s) SubCutaneous before dinner  linezolid  IVPB      linezolid  IVPB 600 milliGRAM(s) IV Intermittent every 12 hours  methylPREDNISolone sodium succinate Injectable 80 milliGRAM(s) IV Push every 6 hours  metoprolol     tartrate 25 milliGRAM(s) Oral two times a day  micafungin IVPB 100 milliGRAM(s) IV Intermittent every 24 hours  micafungin IVPB      montelukast 10 milliGRAM(s) Oral daily  pantoprazole    Tablet 40 milliGRAM(s) Oral before breakfast  senna 2 Tablet(s) Oral at bedtime  tacrolimus 0.5 milliGRAM(s) Oral at bedtime  tiotropium 18 MICROgram(s) Capsule 1 Capsule(s) Inhalation daily    MEDICATIONS  (PRN):  acetaminophen   Tablet. 650 milliGRAM(s) Oral every 6 hours PRN Moderate Pain (4 - 6)  dextrose Gel 1 Dose(s) Oral once PRN Blood Glucose LESS THAN 70 milliGRAM(s)/deciliter  glucagon  Injectable 1 milliGRAM(s) IntraMuscular once PRN Glucose LESS THAN 70 milligrams/deciliter  morphine  - Injectable 2 milliGRAM(s) IV Push every 4 hours PRN Moderate Pain (4 - 6)  ondansetron Injectable 4 milliGRAM(s) IV Push every 8 hours PRN Nausea and/or Vomiting  polyethylene glycol 3350 17 Gram(s) Oral daily PRN Constipation    Vital Signs Last 24 Hrs  T(C): 36.3 (14 Mar 2018 05:46), Max: 37.2 (13 Mar 2018 12:00)  T(F): 97.4 (14 Mar 2018 05:46), Max: 98.9 (13 Mar 2018 12:00)  HR: 79 (14 Mar 2018 05:46) (64 - 96)  BP: 148/67 (14 Mar 2018 05:46) (113/65 - 157/82)  BP(mean): 128 (13 Mar 2018 22:00) (85 - 128)  RR: 18 (14 Mar 2018 05:46) (18 - 41)  SpO2: 95% (13 Mar 2018 22:00) (90% - 98%)  CAPILLARY BLOOD GLUCOSE  256 (14 Mar 2018 07:32)  279 (13 Mar 2018 22:00)  292 (13 Mar 2018 16:30)  300 (13 Mar 2018 11:00)      I&O's Summary    13 Mar 2018 07:01  -  14 Mar 2018 07:00  --------------------------------------------------------  IN: 700 mL / OUT: 402 mL / NET: 298 mL      Physical Exam:    -     General : Lying in bed in no acute distress  -      HEENT: NCAT  -      Cardiac: RRR  -      Pulm: CTA BL   -      GI: soft NTND  -      Musculoskeletal: nl ROM, no edema/cyanosis, R leg w/ dressing c/d/i, kerlex overlying  -      Neuro: A&O x3, no focal deficits        Labs:                        7.7    11.05 )-----------( 384      ( 13 Mar 2018 04:47 )             25.3             03-13    139  |  101  |  34<H>  ----------------------------<  266<H>  4.3   |  31  |  1.3    Ca    8.8      13 Mar 2018 04:47  Mg     2.4     03-13    TPro  5.3<L>  /  Alb  2.8<L>  /  TBili  1.9<H>  /  DBili  0.7<H>  /  AST  51<H>  /  ALT  77<H>  /  AlkPhos  134<H>  03-13    LIVER FUNCTIONS - ( 13 Mar 2018 04:47 )  Alb: 2.8 g/dL / Pro: 5.3 g/dL / ALK PHOS: 134 U/L / ALT: 77 U/L / AST: 51 U/L / GGT: x                 PT/INR - ( 13 Mar 2018 04:47 )   PT: 14.20 sec;   INR: 1.31 ratio        Imaging: NAOE. No new concerns or complaints. Endorsing she wants to go home.    Patient is a 73y old  Female who presents with a chief complaint of right leg wound, needs debridement (04 Mar 2018 15:38)      PAST MEDICAL & SURGICAL HISTORY:  Essential hypertension  Autoimmune hepatitis treated with steroids  Asthma  DVT (deep venous thrombosis)  No significant past surgical history      MEDICATIONS  (STANDING):  ALBUTerol    90 MICROgram(s) HFA Inhaler 1 Puff(s) Inhalation every 4 hours  ALBUTerol/ipratropium for Nebulization 3 milliLiter(s) Nebulizer every 6 hours  chlorhexidine 4% Liquid 1 Application(s) Topical daily  dextrose 50% Injectable 12.5 Gram(s) IV Push once  dextrose 50% Injectable 25 Gram(s) IV Push once  dextrose 50% Injectable 25 Gram(s) IV Push once  docusate sodium 100 milliGRAM(s) Oral three times a day  insulin lispro Injectable (HumaLOG) 5 Unit(s) SubCutaneous before breakfast  insulin lispro Injectable (HumaLOG) 5 Unit(s) SubCutaneous before lunch  insulin lispro Injectable (HumaLOG) 5 Unit(s) SubCutaneous before dinner  linezolid  IVPB      linezolid  IVPB 600 milliGRAM(s) IV Intermittent every 12 hours  methylPREDNISolone sodium succinate Injectable 80 milliGRAM(s) IV Push every 6 hours  metoprolol     tartrate 25 milliGRAM(s) Oral two times a day  micafungin IVPB 100 milliGRAM(s) IV Intermittent every 24 hours  micafungin IVPB      montelukast 10 milliGRAM(s) Oral daily  pantoprazole    Tablet 40 milliGRAM(s) Oral before breakfast  senna 2 Tablet(s) Oral at bedtime  tacrolimus 0.5 milliGRAM(s) Oral at bedtime  tiotropium 18 MICROgram(s) Capsule 1 Capsule(s) Inhalation daily    MEDICATIONS  (PRN):  acetaminophen   Tablet. 650 milliGRAM(s) Oral every 6 hours PRN Moderate Pain (4 - 6)  dextrose Gel 1 Dose(s) Oral once PRN Blood Glucose LESS THAN 70 milliGRAM(s)/deciliter  glucagon  Injectable 1 milliGRAM(s) IntraMuscular once PRN Glucose LESS THAN 70 milligrams/deciliter  morphine  - Injectable 2 milliGRAM(s) IV Push every 4 hours PRN Moderate Pain (4 - 6)  ondansetron Injectable 4 milliGRAM(s) IV Push every 8 hours PRN Nausea and/or Vomiting  polyethylene glycol 3350 17 Gram(s) Oral daily PRN Constipation    Vital Signs Last 24 Hrs  T(C): 36.3 (14 Mar 2018 05:46), Max: 37.2 (13 Mar 2018 12:00)  T(F): 97.4 (14 Mar 2018 05:46), Max: 98.9 (13 Mar 2018 12:00)  HR: 79 (14 Mar 2018 05:46) (64 - 96)  BP: 148/67 (14 Mar 2018 05:46) (113/65 - 157/82)  BP(mean): 128 (13 Mar 2018 22:00) (85 - 128)  RR: 18 (14 Mar 2018 05:46) (18 - 41)  SpO2: 95% (13 Mar 2018 22:00) (90% - 98%)  CAPILLARY BLOOD GLUCOSE  256 (14 Mar 2018 07:32)  279 (13 Mar 2018 22:00)  292 (13 Mar 2018 16:30)  300 (13 Mar 2018 11:00)      I&O's Summary    13 Mar 2018 07:01  -  14 Mar 2018 07:00  --------------------------------------------------------  IN: 700 mL / OUT: 402 mL / NET: 298 mL      Physical Exam:    -     General : Lying in bed in no acute distress  -      HEENT: NCAT  -      Cardiac: RRR  -      Pulm: CTA BL   -      GI: soft NTND  -      Musculoskeletal: nl ROM, no edema/cyanosis, R leg w/ dressing c/d/i, kerlex overlying  -      Neuro: A&O x3, no focal deficits        Labs:                        7.7    11.05 )-----------( 384      ( 13 Mar 2018 04:47 )             25.3             03-13    139  |  101  |  34<H>  ----------------------------<  266<H>  4.3   |  31  |  1.3    Ca    8.8      13 Mar 2018 04:47  Mg     2.4     03-13    TPro  5.3<L>  /  Alb  2.8<L>  /  TBili  1.9<H>  /  DBili  0.7<H>  /  AST  51<H>  /  ALT  77<H>  /  AlkPhos  134<H>  03-13    LIVER FUNCTIONS - ( 13 Mar 2018 04:47 )  Alb: 2.8 g/dL / Pro: 5.3 g/dL / ALK PHOS: 134 U/L / ALT: 77 U/L / AST: 51 U/L / GGT: x                 PT/INR - ( 13 Mar 2018 04:47 )   PT: 14.20 sec;   INR: 1.31 ratio NAOE. No new concerns or complaints. Endorsing she wants to go home.    Patient is a 73y old  Female who presents with a chief complaint of right leg wound, needs debridement (04 Mar 2018 15:38)      PAST MEDICAL & SURGICAL HISTORY:  Essential hypertension  Autoimmune hepatitis treated with steroids  Asthma  DVT (deep venous thrombosis)  No significant past surgical history      MEDICATIONS  (STANDING):  ALBUTerol    90 MICROgram(s) HFA Inhaler 1 Puff(s) Inhalation every 4 hours  ALBUTerol/ipratropium for Nebulization 3 milliLiter(s) Nebulizer every 6 hours  chlorhexidine 4% Liquid 1 Application(s) Topical daily  dextrose 50% Injectable 12.5 Gram(s) IV Push once  dextrose 50% Injectable 25 Gram(s) IV Push once  dextrose 50% Injectable 25 Gram(s) IV Push once  docusate sodium 100 milliGRAM(s) Oral three times a day  insulin lispro Injectable (HumaLOG) 5 Unit(s) SubCutaneous before breakfast  insulin lispro Injectable (HumaLOG) 5 Unit(s) SubCutaneous before lunch  insulin lispro Injectable (HumaLOG) 5 Unit(s) SubCutaneous before dinner  linezolid  IVPB      linezolid  IVPB 600 milliGRAM(s) IV Intermittent every 12 hours  methylPREDNISolone sodium succinate Injectable 80 milliGRAM(s) IV Push every 6 hours  metoprolol     tartrate 25 milliGRAM(s) Oral two times a day  micafungin IVPB 100 milliGRAM(s) IV Intermittent every 24 hours  micafungin IVPB      montelukast 10 milliGRAM(s) Oral daily  pantoprazole    Tablet 40 milliGRAM(s) Oral before breakfast  senna 2 Tablet(s) Oral at bedtime  tacrolimus 0.5 milliGRAM(s) Oral at bedtime  tiotropium 18 MICROgram(s) Capsule 1 Capsule(s) Inhalation daily    MEDICATIONS  (PRN):  acetaminophen   Tablet. 650 milliGRAM(s) Oral every 6 hours PRN Moderate Pain (4 - 6)  dextrose Gel 1 Dose(s) Oral once PRN Blood Glucose LESS THAN 70 milliGRAM(s)/deciliter  glucagon  Injectable 1 milliGRAM(s) IntraMuscular once PRN Glucose LESS THAN 70 milligrams/deciliter  morphine  - Injectable 2 milliGRAM(s) IV Push every 4 hours PRN Moderate Pain (4 - 6)  ondansetron Injectable 4 milliGRAM(s) IV Push every 8 hours PRN Nausea and/or Vomiting  polyethylene glycol 3350 17 Gram(s) Oral daily PRN Constipation    Vital Signs Last 24 Hrs  T(C): 36.3 (14 Mar 2018 05:46), Max: 37.2 (13 Mar 2018 12:00)  T(F): 97.4 (14 Mar 2018 05:46), Max: 98.9 (13 Mar 2018 12:00)  HR: 79 (14 Mar 2018 05:46) (64 - 96)  BP: 148/67 (14 Mar 2018 05:46) (113/65 - 157/82)  BP(mean): 128 (13 Mar 2018 22:00) (85 - 128)  RR: 18 (14 Mar 2018 05:46) (18 - 41)  SpO2: 95% (13 Mar 2018 22:00) (90% - 98%)  CAPILLARY BLOOD GLUCOSE  256 (14 Mar 2018 07:32)  279 (13 Mar 2018 22:00)  292 (13 Mar 2018 16:30)  300 (13 Mar 2018 11:00)      I&O's Summary    13 Mar 2018 07:01  -  14 Mar 2018 07:00  --------------------------------------------------------  IN: 700 mL / OUT: 402 mL / NET: 298 mL      Physical Exam:    -     General : Lying in bed in no acute distress  -      HEENT: NCAT  -      Cardiac: RRR  -      Pulm: CTA BL   -      GI: soft NTND  -      Musculoskeletal: nl ROM, no edema/cyanosis, R leg w/ dressing c/d/i, kerlex overlying  -      Neuro: A&O x3, no focal deficits        Labs:                                   8.6    14.85 )-----------( 403      ( 14 Mar 2018 08:44 )             28.3   03-14    138  |  99  |  29<H>  ----------------------------<  242<H>  4.1   |  27  |  1.0    Ca    8.9      14 Mar 2018 08:44  Mg     2.4     03-13    TPro  5.3<L>  /  Alb  2.8<L>  /  TBili  1.9<H>  /  DBili  0.7<H>  /  AST  51<H>  /  ALT  77<H>  /  AlkPhos  134<H>  03-13  PT/INR - ( 14 Mar 2018 08:44 )   PT: 14.50 sec;   INR: 1.33 ratio         PTT - ( 14 Mar 2018 08:44 )  PTT:22.2 sec NAOE. No new concerns or complaints. Endorsing she wants to go home.    Patient is a 73y old  Female who presents with a chief complaint of right leg wound, needs debridement (04 Mar 2018 15:38)      PAST MEDICAL & SURGICAL HISTORY:  Essential hypertension  Autoimmune hepatitis treated with steroids  Asthma  DVT (deep venous thrombosis)  No significant past surgical history      MEDICATIONS  (STANDING):  ALBUTerol    90 MICROgram(s) HFA Inhaler 1 Puff(s) Inhalation every 4 hours  ALBUTerol/ipratropium for Nebulization 3 milliLiter(s) Nebulizer every 6 hours  chlorhexidine 4% Liquid 1 Application(s) Topical daily  dextrose 50% Injectable 12.5 Gram(s) IV Push once  dextrose 50% Injectable 25 Gram(s) IV Push once  dextrose 50% Injectable 25 Gram(s) IV Push once  docusate sodium 100 milliGRAM(s) Oral three times a day  insulin lispro Injectable (HumaLOG) 5 Unit(s) SubCutaneous before breakfast  insulin lispro Injectable (HumaLOG) 5 Unit(s) SubCutaneous before lunch  insulin lispro Injectable (HumaLOG) 5 Unit(s) SubCutaneous before dinner  linezolid  IVPB      linezolid  IVPB 600 milliGRAM(s) IV Intermittent every 12 hours  methylPREDNISolone sodium succinate Injectable 80 milliGRAM(s) IV Push every 6 hours  metoprolol     tartrate 25 milliGRAM(s) Oral two times a day  micafungin IVPB 100 milliGRAM(s) IV Intermittent every 24 hours  micafungin IVPB      montelukast 10 milliGRAM(s) Oral daily  pantoprazole    Tablet 40 milliGRAM(s) Oral before breakfast  senna 2 Tablet(s) Oral at bedtime  tacrolimus 0.5 milliGRAM(s) Oral at bedtime  tiotropium 18 MICROgram(s) Capsule 1 Capsule(s) Inhalation daily    MEDICATIONS  (PRN):  acetaminophen   Tablet. 650 milliGRAM(s) Oral every 6 hours PRN Moderate Pain (4 - 6)  dextrose Gel 1 Dose(s) Oral once PRN Blood Glucose LESS THAN 70 milliGRAM(s)/deciliter  glucagon  Injectable 1 milliGRAM(s) IntraMuscular once PRN Glucose LESS THAN 70 milligrams/deciliter  morphine  - Injectable 2 milliGRAM(s) IV Push every 4 hours PRN Moderate Pain (4 - 6)  ondansetron Injectable 4 milliGRAM(s) IV Push every 8 hours PRN Nausea and/or Vomiting  polyethylene glycol 3350 17 Gram(s) Oral daily PRN Constipation    Vital Signs Last 24 Hrs  T(C): 36.3 (14 Mar 2018 05:46), Max: 37.2 (13 Mar 2018 12:00)  T(F): 97.4 (14 Mar 2018 05:46), Max: 98.9 (13 Mar 2018 12:00)  HR: 79 (14 Mar 2018 05:46) (64 - 96)  BP: 148/67 (14 Mar 2018 05:46) (113/65 - 157/82)  BP(mean): 128 (13 Mar 2018 22:00) (85 - 128)  RR: 18 (14 Mar 2018 05:46) (18 - 41)  SpO2: 95% (13 Mar 2018 22:00) (90% - 98%)  CAPILLARY BLOOD GLUCOSE  256 (14 Mar 2018 07:32)  279 (13 Mar 2018 22:00)  292 (13 Mar 2018 16:30)  300 (13 Mar 2018 11:00)      I&O's Summary    13 Mar 2018 07:01  -  14 Mar 2018 07:00  --------------------------------------------------------  IN: 700 mL / OUT: 402 mL / NET: 298 mL      Physical Exam:    -     General : Lying in bed in no acute distress  -      HEENT: NCAT  -      Cardiac: RRR  -      Pulm: CTA BL   -      GI: soft NTND  -      Musculoskeletal: nl ROM, no edema/cyanosis, R leg w/ dressing c/d/i, kerlex overlying  -      Neuro: A&O x3, no focal deficits;         Labs:                                   8.6    14.85 )-----------( 403      ( 14 Mar 2018 08:44 )             28.3   03-14    138  |  99  |  29<H>  ----------------------------<  242<H>  4.1   |  27  |  1.0    Ca    8.9      14 Mar 2018 08:44  Mg     2.4     03-13    TPro  5.3<L>  /  Alb  2.8<L>  /  TBili  1.9<H>  /  DBili  0.7<H>  /  AST  51<H>  /  ALT  77<H>  /  AlkPhos  134<H>  03-13  PT/INR - ( 14 Mar 2018 08:44 )   PT: 14.50 sec;   INR: 1.33 ratio         PTT - ( 14 Mar 2018 08:44 )  PTT:22.2 sec

## 2018-03-15 LAB
GBM IGG SER-ACNC: <0.2 U — SIGNIFICANT CHANGE UP
HCT VFR BLD CALC: 28.3 % — LOW (ref 37–47)
HGB BLD-MCNC: 8.6 G/DL — LOW (ref 12–16)
INR BLD: 1.37 RATIO — HIGH (ref 0.65–1.3)
MCHC RBC-ENTMCNC: 22.9 PG — LOW (ref 27–31)
MCHC RBC-ENTMCNC: 30.4 G/DL — LOW (ref 32–37)
MCV RBC AUTO: 75.3 FL — LOW (ref 81–99)
NRBC # BLD: 0 /100 WBCS — SIGNIFICANT CHANGE UP (ref 0–0)
PLATELET # BLD AUTO: 440 K/UL — HIGH (ref 130–400)
PROTHROM AB SERPL-ACNC: 14.9 SEC — HIGH (ref 9.95–12.87)
RBC # BLD: 3.76 M/UL — LOW (ref 4.2–5.4)
RBC # FLD: 19 % — HIGH (ref 11.5–14.5)
WBC # BLD: 15.03 K/UL — HIGH (ref 4.8–10.8)
WBC # FLD AUTO: 15.03 K/UL — HIGH (ref 4.8–10.8)

## 2018-03-15 RX ORDER — LINEZOLID 600 MG/300ML
600 INJECTION, SOLUTION INTRAVENOUS EVERY 12 HOURS
Qty: 0 | Refills: 0 | Status: DISCONTINUED | OUTPATIENT
Start: 2018-03-15 | End: 2018-03-23

## 2018-03-15 RX ORDER — BACITRACIN ZINC 500 UNIT/G
1 OINTMENT IN PACKET (EA) TOPICAL
Qty: 0 | Refills: 0 | Status: DISCONTINUED | OUTPATIENT
Start: 2018-03-15 | End: 2018-03-23

## 2018-03-15 RX ORDER — HYDROCORTISONE 1 %
1 OINTMENT (GRAM) TOPICAL DAILY
Qty: 0 | Refills: 0 | Status: DISCONTINUED | OUTPATIENT
Start: 2018-03-15 | End: 2018-03-19

## 2018-03-15 RX ADMIN — TACROLIMUS 0.5 MILLIGRAM(S): 5 CAPSULE ORAL at 21:52

## 2018-03-15 RX ADMIN — LINEZOLID 300 MILLIGRAM(S): 600 INJECTION, SOLUTION INTRAVENOUS at 06:45

## 2018-03-15 RX ADMIN — MONTELUKAST 10 MILLIGRAM(S): 4 TABLET, CHEWABLE ORAL at 21:53

## 2018-03-15 RX ADMIN — Medication 1 SUPPOSITORY(S): at 11:19

## 2018-03-15 RX ADMIN — Medication 650 MILLIGRAM(S): at 22:30

## 2018-03-15 RX ADMIN — PANTOPRAZOLE SODIUM 40 MILLIGRAM(S): 20 TABLET, DELAYED RELEASE ORAL at 06:02

## 2018-03-15 RX ADMIN — Medication 60 MILLIGRAM(S): at 17:47

## 2018-03-15 RX ADMIN — HEPARIN SODIUM 5000 UNIT(S): 5000 INJECTION INTRAVENOUS; SUBCUTANEOUS at 21:54

## 2018-03-15 RX ADMIN — Medication 25 MILLIGRAM(S): at 17:48

## 2018-03-15 RX ADMIN — HEPARIN SODIUM 5000 UNIT(S): 5000 INJECTION INTRAVENOUS; SUBCUTANEOUS at 14:23

## 2018-03-15 RX ADMIN — Medication 25 MILLIGRAM(S): at 05:36

## 2018-03-15 RX ADMIN — HEPARIN SODIUM 5000 UNIT(S): 5000 INJECTION INTRAVENOUS; SUBCUTANEOUS at 05:37

## 2018-03-15 RX ADMIN — Medication 60 MILLIGRAM(S): at 06:02

## 2018-03-15 RX ADMIN — Medication 5 UNIT(S): at 08:12

## 2018-03-15 RX ADMIN — INSULIN GLARGINE 10 UNIT(S): 100 INJECTION, SOLUTION SUBCUTANEOUS at 21:55

## 2018-03-15 RX ADMIN — Medication 1 APPLICATION(S): at 18:32

## 2018-03-15 RX ADMIN — Medication 5 UNIT(S): at 17:47

## 2018-03-15 RX ADMIN — LINEZOLID 600 MILLIGRAM(S): 600 INJECTION, SOLUTION INTRAVENOUS at 17:48

## 2018-03-15 NOTE — PROGRESS NOTE ADULT - ASSESSMENT
72 yo female with hx of HTN, autoimmune hepatitis on tacrolimus, h/o PCP pneumonia, asthma, prothrombin gene mutation/DVT in 3/2017, p/w persistent pain s/p surgical wound closure after sustaining mechanical fall at home at [facility], admitted for worsening wound infection who was initially admitted under medical service s/p debridement. Her hospital course was complicated by acute hypoxic RF requiring ICU admission; s/p BAL thought to be 2/2 ORSA/fungal PNA. She was downgraded to the medical floors 3/13 and her problems are being managed as follows --      #Acute Respiratory Failure, secondary to  DAH and PNA (BAL on 3/9 revealing ORSA and yeast)   - O2 therapy, continue to titrate down prn  - Pulm following, recommended decrease Solumedrol 60mg q12h  - Solumedrol weaned from 1000 mg to 120 mg in the course of a few days, concern for weakness  - continue respiratory treatments, Montelukast  - ID following, Zyvox, changed to PO, Micafungin discontinued    #LUE thrombophlebitis  - warm compresses     #Hemorrhoids w/ mild bleeding  - Anusol ordered  - monitor CBC , currently stable    #Hypercoagulability 2/2 prothrombin mutation, h/o DVT and PE 3/2017  - Coumadin on hold 2/2 DAH  - continue to monitor daily INR  - resume Coumadin once cleared by pulm    #Right leg laceration s/p debridement 2/28  - local wound care per burn    #HTN, stable  - continue Norvasc and Metoprolol    #AIH   - GI following  - continue Tacrolimus  - on high dose steroid for pulm dx    #Diet: low sodium     #Activity: OOB as tolerated    #DVT/GI ppx: SEE (cleared by pulm since pt high risk)    #Dispo: pending resolution of PNA  - rehab evaluation    Case discussed in detail with house officer and primary nurse

## 2018-03-15 NOTE — PROGRESS NOTE ADULT - SUBJECTIVE AND OBJECTIVE BOX
Overnight pt c/o some bleeding per rectum 2/2 hemorrhoids. H&H stable. No other concerns or complaints.    Patient is a 73y old  Female who presents with a chief complaint of right leg wound, needs debridement (04 Mar 2018 15:38)      PAST MEDICAL & SURGICAL HISTORY:  Essential hypertension  Autoimmune hepatitis treated with steroids  Asthma  DVT (deep venous thrombosis)  No significant past surgical history      MEDICATIONS  (STANDING):  ALBUTerol    90 MICROgram(s) HFA Inhaler 1 Puff(s) Inhalation every 4 hours  ALBUTerol/ipratropium for Nebulization 3 milliLiter(s) Nebulizer every 6 hours  BACItracin   Ointment 1 Application(s) Topical two times a day  dextrose 50% Injectable 12.5 Gram(s) IV Push once  dextrose 50% Injectable 25 Gram(s) IV Push once  dextrose 50% Injectable 25 Gram(s) IV Push once  docusate sodium 100 milliGRAM(s) Oral three times a day  heparin  Injectable 5000 Unit(s) SubCutaneous every 8 hours  hydrocortisone hemorrhoidal Suppository 1 Suppository(s) Rectal daily  insulin glargine Injectable (LANTUS) 10 Unit(s) SubCutaneous at bedtime  insulin lispro Injectable (HumaLOG) 5 Unit(s) SubCutaneous before breakfast  insulin lispro Injectable (HumaLOG) 5 Unit(s) SubCutaneous before lunch  insulin lispro Injectable (HumaLOG) 5 Unit(s) SubCutaneous before dinner  linezolid    Tablet 600 milliGRAM(s) Oral every 12 hours  methylPREDNISolone sodium succinate Injectable 60 milliGRAM(s) IV Push every 12 hours  metoprolol     tartrate 25 milliGRAM(s) Oral two times a day  montelukast 10 milliGRAM(s) Oral at bedtime  pantoprazole    Tablet 40 milliGRAM(s) Oral before breakfast  senna 2 Tablet(s) Oral at bedtime  tacrolimus 0.5 milliGRAM(s) Oral at bedtime  tiotropium 18 MICROgram(s) Capsule 1 Capsule(s) Inhalation daily    MEDICATIONS  (PRN):  acetaminophen   Tablet. 650 milliGRAM(s) Oral every 6 hours PRN Moderate Pain (4 - 6)  dextrose Gel 1 Dose(s) Oral once PRN Blood Glucose LESS THAN 70 milliGRAM(s)/deciliter  glucagon  Injectable 1 milliGRAM(s) IntraMuscular once PRN Glucose LESS THAN 70 milligrams/deciliter  morphine  - Injectable 2 milliGRAM(s) IV Push every 4 hours PRN Moderate Pain (4 - 6)  ondansetron Injectable 4 milliGRAM(s) IV Push every 8 hours PRN Nausea and/or Vomiting  polyethylene glycol 3350 17 Gram(s) Oral daily PRN Constipation      Overnight events:    Vital Signs Last 24 Hrs  T(C): 36.7 (15 Mar 2018 05:43), Max: 36.7 (15 Mar 2018 05:43)  T(F): 98 (15 Mar 2018 05:43), Max: 98 (15 Mar 2018 05:43)  HR: 77 (15 Mar 2018 05:43) (77 - 94)  BP: 155/77 (15 Mar 2018 05:43) (148/70 - 160/70)  BP(mean): --  RR: 18 (15 Mar 2018 05:43) (18 - 20)  SpO2: 95% (14 Mar 2018 19:30) (95% - 95%)  CAPILLARY BLOOD GLUCOSE  110 (15 Mar 2018 11:22)  195 (15 Mar 2018 07:26)  221 (15 Mar 2018 02:00)  336 (14 Mar 2018 20:41)  242 (14 Mar 2018 16:43)        Physical Exam:    -     General : Lying in bed in no acute distress  -      HEENT: NCAT  -      Cardiac: RRR  -      Pulm: CTA BL   -      GI: soft NTND  -      Musculoskeletal: nl ROM, no edema/cyanosis, R leg w/ dressing c/d/i , LUE w/ local redness to area superior to antecubital fossa  -      Neuro: A&O x3, no focal deficits      Labs:                        8.6    15.03 )-----------( 440      ( 15 Mar 2018 08:33 )             28.3             03-14    138  |  99  |  29<H>  ----------------------------<  242<H>  4.1   |  27  |  1.0    Ca    8.9      14 Mar 2018 08:44  Mg     2.3     03-14    TPro  5.6<L>  /  Alb  3.0  /  TBili  2.4<H>  /  DBili  0.9<H>  /  AST  37  /  ALT  80<H>  /  AlkPhos  143<H>  03-14    LIVER FUNCTIONS - ( 14 Mar 2018 08:44 )  Alb: 3.0 g/dL / Pro: 5.6 g/dL / ALK PHOS: 143 U/L / ALT: 80 U/L / AST: 37 U/L / GGT: x                 PT/INR - ( 15 Mar 2018 08:33 )   PT: 14.90 sec;   INR: 1.37 ratio         PTT - ( 14 Mar 2018 08:44 )  PTT:22.2 sec

## 2018-03-15 NOTE — PROGRESS NOTE ADULT - SUBJECTIVE AND OBJECTIVE BOX
DARNELL, DONI  73y, Female      OVERNIGHT EVENTS:    no fevers, feels well, no cough, minimal SOB, no chest pain.    VITALS:  T(F): 98, Max: 98 (03-15-18 @ 05:43)  HR: 77  BP: 155/77  RR: 18Vital Signs Last 24 Hrs  T(C): 36.7 (15 Mar 2018 05:43), Max: 36.7 (15 Mar 2018 05:43)  T(F): 98 (15 Mar 2018 05:43), Max: 98 (15 Mar 2018 05:43)  HR: 77 (15 Mar 2018 05:43) (77 - 94)  BP: 155/77 (15 Mar 2018 05:43) (148/70 - 160/70)  BP(mean): --  RR: 18 (15 Mar 2018 05:43) (18 - 20)  SpO2: 95% (14 Mar 2018 19:30) (95% - 95%)    TESTS & MEASUREMENTS:                        8.6    14.85 )-----------( 403      ( 14 Mar 2018 08:44 )             28.3     03-14    138  |  99  |  29<H>  ----------------------------<  242<H>  4.1   |  27  |  1.0    Ca    8.9      14 Mar 2018 08:44  Mg     2.3     03-14    TPro  5.6<L>  /  Alb  3.0  /  TBili  2.4<H>  /  DBili  0.9<H>  /  AST  37  /  ALT  80<H>  /  AlkPhos  143<H>  03-14    LIVER FUNCTIONS - ( 14 Mar 2018 08:44 )  Alb: 3.0 g/dL / Pro: 5.6 g/dL / ALK PHOS: 143 U/L / ALT: 80 U/L / AST: 37 U/L / GGT: x             Culture - Fungal, Bronchial (collected 03-09-18 @ 09:30)  Source: .Broncial BAL  Preliminary Report (03-12-18 @ 12:07):    Few-moderate Yeast    Culture - Bronchial (collected 03-09-18 @ 09:30)  Source: Bronch Wash BAL  Gram Stain (03-09-18 @ 23:58):    Few polymorphonuclear leukocytes per low power field    Moderate Squamous epithelial cells per low power field    Few Yeast like cells per oil power field    Moderate Gram Positive Cocci in Clusters per oil power field    Few Gram Negative Diplococci per oil power field    Rare Gram Negative Rods per oil power field  Final Report (03-11-18 @ 16:58):    Moderate Methicillin resistant Staphylococcus aureus    Normal Respiratory Nikki present  Organism: Methicillin resistant Staphylococcus aureus (03-11-18 @ 16:58)  Organism: Methicillin resistant Staphylococcus aureus (03-11-18 @ 16:58)      -  Ampicillin/Sulbactam: R 16/8      -  Cefazolin: R 8      -  Ciprofloxacin: R >2      -  Clindamycin: R <=0.25      -  Erythromycin: R >4      -  Gentamicin: S 2      -  Levofloxacin: R >4      -  Linezolid: S 2      -  Moxifloxacin(Aerobic): R 2      -  Oxacillin: R >2      -  Penicillin: R >8      -  RIF- Rifampin: S <=1      -  Tetra/Doxy: S 4      -  Trimethoprim/Sulfamethoxazole: S <=0.5/9.5      -  Vancomycin: S 2      Method Type: EROS    Culture - Acid Fast - Bronchial w/Smear (collected 03-09-18 @ 09:30)  Source: .Broncial Bronchoalveolar Lavage            RADIOLOGY & ADDITIONAL TESTS:    ANTIBIOTICS:  linezolid  IVPB      linezolid  IVPB 600 milliGRAM(s) IV Intermittent every 12 hours  micafungin IVPB      micafungin IVPB 100 milliGRAM(s) IV Intermittent every 24 hours

## 2018-03-15 NOTE — PROGRESS NOTE ADULT - ASSESSMENT
PNA.  BAL positive for ORSA    RLE ulcers without evidence of ongoing infection.  Left antecubital fossa with nonsuppurative phlebitis.    po Zyvos 600mg po q12h  D/C other antibiotics.  warm compressors to left antecubital fossa.

## 2018-03-15 NOTE — PROGRESS NOTE ADULT - SUBJECTIVE AND OBJECTIVE BOX
DARNELL, DONI  73y  Female  HPI:  Pt states she fell Monday and went for evaluation to [facility] and laceration was sutured and pt was discharged. Pt came back today for increasing pain in right LE. Patient denies calf pain, fevers, chest pain, SOB, abdominal pain, nausea, vomiting, diarrhea, dizziness, weakness. (27 Feb 2018 13:32)    MEDICATIONS  (STANDING):  ALBUTerol    90 MICROgram(s) HFA Inhaler 1 Puff(s) Inhalation every 4 hours  ALBUTerol/ipratropium for Nebulization 3 milliLiter(s) Nebulizer every 6 hours  BACItracin   Ointment 1 Application(s) Topical two times a day  dextrose 50% Injectable 12.5 Gram(s) IV Push once  dextrose 50% Injectable 25 Gram(s) IV Push once  dextrose 50% Injectable 25 Gram(s) IV Push once  docusate sodium 100 milliGRAM(s) Oral three times a day  heparin  Injectable 5000 Unit(s) SubCutaneous every 8 hours  hydrocortisone hemorrhoidal Suppository 1 Suppository(s) Rectal daily  insulin glargine Injectable (LANTUS) 10 Unit(s) SubCutaneous at bedtime  insulin lispro Injectable (HumaLOG) 5 Unit(s) SubCutaneous before breakfast  insulin lispro Injectable (HumaLOG) 5 Unit(s) SubCutaneous before lunch  insulin lispro Injectable (HumaLOG) 5 Unit(s) SubCutaneous before dinner  linezolid    Tablet 600 milliGRAM(s) Oral every 12 hours  methylPREDNISolone sodium succinate Injectable 60 milliGRAM(s) IV Push every 12 hours  metoprolol     tartrate 25 milliGRAM(s) Oral two times a day  montelukast 10 milliGRAM(s) Oral at bedtime  pantoprazole    Tablet 40 milliGRAM(s) Oral before breakfast  senna 2 Tablet(s) Oral at bedtime  tacrolimus 0.5 milliGRAM(s) Oral at bedtime  tiotropium 18 MICROgram(s) Capsule 1 Capsule(s) Inhalation daily    MEDICATIONS  (PRN):  acetaminophen   Tablet. 650 milliGRAM(s) Oral every 6 hours PRN Moderate Pain (4 - 6)  dextrose Gel 1 Dose(s) Oral once PRN Blood Glucose LESS THAN 70 milliGRAM(s)/deciliter  glucagon  Injectable 1 milliGRAM(s) IntraMuscular once PRN Glucose LESS THAN 70 milligrams/deciliter  morphine  - Injectable 2 milliGRAM(s) IV Push every 4 hours PRN Moderate Pain (4 - 6)  ondansetron Injectable 4 milliGRAM(s) IV Push every 8 hours PRN Nausea and/or Vomiting  polyethylene glycol 3350 17 Gram(s) Oral daily PRN Constipation    INTERVAL EVENTS: Patient seen today remains on O2 via NC. Patient states she feels very tired today and lacks energy??    T(C): 36 (03-15-18 @ 12:49), Max: 36.7 (03-15-18 @ 05:43)  HR: 99 (03-15-18 @ 12:49) (77 - 99)  BP: 164/79 (03-15-18 @ 12:49) (148/70 - 164/79)  RR: 19 (03-15-18 @ 12:49) (18 - 20)  SpO2: 95% (03-15-18 @ 13:20) (95% - 95%)  Wt(kg): --Vital Signs Last 24 Hrs  T(C): 36 (15 Mar 2018 12:49), Max: 36.7 (15 Mar 2018 05:43)  T(F): 96.8 (15 Mar 2018 12:49), Max: 98 (15 Mar 2018 05:43)  HR: 99 (15 Mar 2018 12:49) (77 - 99)  BP: 164/79 (15 Mar 2018 12:49) (148/70 - 164/79)  BP(mean): --  RR: 19 (15 Mar 2018 12:49) (18 - 20)  SpO2: 95% (15 Mar 2018 13:20) (95% - 95%)    PHYSICAL EXAM:  GENERAL: NAD  NECK: Supple, No JVD  CHEST/LUNG: Decreased effort, Clear, No wheezing  HEART: S1, S2, Regular rate and rhythm;   ABDOMEN: Soft, Nontender, Nondistended; Bowel sounds present  EXTREMITIES: Trace edema, dressing to RLE intact    LABS:  Labs:                        8.6    15.03 )-----------( 440      ( 15 Mar 2018 08:33 )             28.3             03-14    138  |  99  |  29<H>  ----------------------------<  242<H>  4.1   |  27  |  1.0    Ca    8.9      14 Mar 2018 08:44  Mg     2.3     03-14    TPro  5.6<L>  /  Alb  3.0  /  TBili  2.4<H>  /  DBili  0.9<H>  /  AST  37  /  ALT  80<H>  /  AlkPhos  143<H>  03-14    LIVER FUNCTIONS - ( 14 Mar 2018 08:44 )  Alb: 3.0 g/dL / Pro: 5.6 g/dL / ALK PHOS: 143 U/L / ALT: 80 U/L / AST: 37 U/L / GGT: x                 PT/INR - ( 15 Mar 2018 08:33 )   PT: 14.90 sec;   INR: 1.37 ratio         PTT - ( 14 Mar 2018 08:44 )  PTT:22.2 sec    RADIOLOGY & ADDITIONAL TESTS:  < from: Xray Chest 1 View- PORTABLE-Routine (03.14.18 @ 06:47) >  Impression:      Slightly decreased bilateral interstitial opacities.    < end of copied text >

## 2018-03-15 NOTE — PROGRESS NOTE ADULT - ASSESSMENT
74 yo female with hx of HTN, autoimmune hepatitis on tacrolimus, h/o PCP pneumonia, asthma, prothrombin gene mutation/DVT in 3/2017, p/w persistent pain s/p surgical wound closure after sustaining mechanical fall at home at [facility], admitted for worsening wound infection who was initially admitted under medical service s/p debridement. Her hospital course was complicated by acute hypoxic RF requiring ICU admission; s/p BAL thought to be 2/2 ORSA/fungal PNA. She was downgraded to the medical floors 3/13 and her problems are being managed as follows --      #Acute hypoxic Respiratory Failure, working diagnosis is 2/2 DAH and PNA (BAL on 3/9 revealing ORSA and yeast)   - c/w O2 therapy, continue to titrate down prn  - Pulm following, appreciate recs   - c/w solumedrol 60mg q12h  - c/w inhalers, nebs, montelukast  - ID following, appreciate recs  - c/w PO zyvox, d/mark other abs    #LUE thrombophlebitis  - c/w warm compresses     #Hemorrhoids w/ mild bleeding  - c/w anusol  - monitor CBC , currently stable    #Hypercoagulability 2/2 prothrombin mutation, h/o DVT and PE 3/2017  - Coumadin on hold 2/2 DAH  - continue to monitor daily INR  - resume coumadin once cleared by pulm    #Right leg laceration s/p debridement 2/28  - local wound care per burn    #HTN, stable  - continue Norvasc and Metoprolol    #AIH   - GI following, appreciate recs  - c/w tacrolimus  - already on high dose steroid for pulm dx    #Diet: low sodium     #Activity: OOB as tolerated    #DVT/GI ppx: SEE (cleared by pulm since pt high risk)    #Dispo: pending resolution of PNA and improvement of pulm sxs, will assess ambulatory status and need for PT services

## 2018-03-16 LAB
ANION GAP SERPL CALC-SCNC: 13 MMOL/L — SIGNIFICANT CHANGE UP (ref 7–14)
APTT BLD: 36.1 SEC — SIGNIFICANT CHANGE UP (ref 27–39.2)
BUN SERPL-MCNC: 24 MG/DL — HIGH (ref 10–20)
CALCIUM SERPL-MCNC: 8.1 MG/DL — LOW (ref 8.5–10.1)
CHLORIDE SERPL-SCNC: 105 MMOL/L — SIGNIFICANT CHANGE UP (ref 98–110)
CO2 SERPL-SCNC: 25 MMOL/L — SIGNIFICANT CHANGE UP (ref 17–32)
CREAT SERPL-MCNC: 0.9 MG/DL — SIGNIFICANT CHANGE UP (ref 0.7–1.5)
GLUCOSE SERPL-MCNC: 97 MG/DL — SIGNIFICANT CHANGE UP (ref 70–110)
HCT VFR BLD CALC: 27.8 % — LOW (ref 37–47)
HGB BLD-MCNC: 8.3 G/DL — LOW (ref 12–16)
INR BLD: 1.25 RATIO — SIGNIFICANT CHANGE UP (ref 0.65–1.3)
MCHC RBC-ENTMCNC: 22.5 PG — LOW (ref 27–31)
MCHC RBC-ENTMCNC: 29.9 G/DL — LOW (ref 32–37)
MCV RBC AUTO: 75.3 FL — LOW (ref 81–99)
NRBC # BLD: 0 /100 WBCS — SIGNIFICANT CHANGE UP (ref 0–0)
PLATELET # BLD AUTO: 378 K/UL — SIGNIFICANT CHANGE UP (ref 130–400)
POTASSIUM SERPL-MCNC: 4.3 MMOL/L — SIGNIFICANT CHANGE UP (ref 3.5–5)
POTASSIUM SERPL-SCNC: 4.3 MMOL/L — SIGNIFICANT CHANGE UP (ref 3.5–5)
PROTHROM AB SERPL-ACNC: 13.6 SEC — HIGH (ref 9.95–12.87)
RBC # BLD: 3.69 M/UL — LOW (ref 4.2–5.4)
RBC # FLD: 19.4 % — HIGH (ref 11.5–14.5)
SODIUM SERPL-SCNC: 143 MMOL/L — SIGNIFICANT CHANGE UP (ref 135–146)
WBC # BLD: 11.41 K/UL — HIGH (ref 4.8–10.8)
WBC # FLD AUTO: 11.41 K/UL — HIGH (ref 4.8–10.8)

## 2018-03-16 RX ADMIN — HEPARIN SODIUM 5000 UNIT(S): 5000 INJECTION INTRAVENOUS; SUBCUTANEOUS at 21:41

## 2018-03-16 RX ADMIN — PANTOPRAZOLE SODIUM 40 MILLIGRAM(S): 20 TABLET, DELAYED RELEASE ORAL at 05:17

## 2018-03-16 RX ADMIN — Medication 5 UNIT(S): at 18:25

## 2018-03-16 RX ADMIN — Medication 1 APPLICATION(S): at 06:32

## 2018-03-16 RX ADMIN — LINEZOLID 600 MILLIGRAM(S): 600 INJECTION, SOLUTION INTRAVENOUS at 18:04

## 2018-03-16 RX ADMIN — MONTELUKAST 10 MILLIGRAM(S): 4 TABLET, CHEWABLE ORAL at 21:41

## 2018-03-16 RX ADMIN — HEPARIN SODIUM 5000 UNIT(S): 5000 INJECTION INTRAVENOUS; SUBCUTANEOUS at 13:42

## 2018-03-16 RX ADMIN — Medication 60 MILLIGRAM(S): at 05:18

## 2018-03-16 RX ADMIN — Medication 5 UNIT(S): at 13:36

## 2018-03-16 RX ADMIN — Medication 3 MILLILITER(S): at 19:27

## 2018-03-16 RX ADMIN — Medication 60 MILLIGRAM(S): at 18:04

## 2018-03-16 RX ADMIN — Medication 1 APPLICATION(S): at 17:59

## 2018-03-16 RX ADMIN — Medication 5 UNIT(S): at 11:07

## 2018-03-16 RX ADMIN — Medication 3 MILLILITER(S): at 13:51

## 2018-03-16 RX ADMIN — Medication 25 MILLIGRAM(S): at 05:17

## 2018-03-16 RX ADMIN — TACROLIMUS 0.5 MILLIGRAM(S): 5 CAPSULE ORAL at 21:41

## 2018-03-16 RX ADMIN — LINEZOLID 600 MILLIGRAM(S): 600 INJECTION, SOLUTION INTRAVENOUS at 05:18

## 2018-03-16 RX ADMIN — INSULIN GLARGINE 10 UNIT(S): 100 INJECTION, SOLUTION SUBCUTANEOUS at 21:42

## 2018-03-16 RX ADMIN — Medication 25 MILLIGRAM(S): at 18:04

## 2018-03-16 RX ADMIN — Medication 100 MILLIGRAM(S): at 13:42

## 2018-03-16 RX ADMIN — HEPARIN SODIUM 5000 UNIT(S): 5000 INJECTION INTRAVENOUS; SUBCUTANEOUS at 05:17

## 2018-03-16 NOTE — PROGRESS NOTE ADULT - SUBJECTIVE AND OBJECTIVE BOX
Patient is a 73y old  Female who presents with a chief complaint of right leg wound, needs debridement (04 Mar 2018 15:38)      PAST MEDICAL & SURGICAL HISTORY:  Essential hypertension  Autoimmune hepatitis treated with steroids  Asthma  DVT (deep venous thrombosis)  No significant past surgical history      MEDICATIONS  (STANDING):  ALBUTerol    90 MICROgram(s) HFA Inhaler 1 Puff(s) Inhalation every 4 hours  ALBUTerol/ipratropium for Nebulization 3 milliLiter(s) Nebulizer every 6 hours  BACItracin   Ointment 1 Application(s) Topical two times a day  dextrose 50% Injectable 12.5 Gram(s) IV Push once  dextrose 50% Injectable 25 Gram(s) IV Push once  dextrose 50% Injectable 25 Gram(s) IV Push once  docusate sodium 100 milliGRAM(s) Oral three times a day  heparin  Injectable 5000 Unit(s) SubCutaneous every 8 hours  hydrocortisone hemorrhoidal Suppository 1 Suppository(s) Rectal daily  insulin glargine Injectable (LANTUS) 10 Unit(s) SubCutaneous at bedtime  insulin lispro Injectable (HumaLOG) 5 Unit(s) SubCutaneous before breakfast  insulin lispro Injectable (HumaLOG) 5 Unit(s) SubCutaneous before lunch  insulin lispro Injectable (HumaLOG) 5 Unit(s) SubCutaneous before dinner  linezolid    Tablet 600 milliGRAM(s) Oral every 12 hours  methylPREDNISolone sodium succinate Injectable 60 milliGRAM(s) IV Push every 12 hours  metoprolol     tartrate 25 milliGRAM(s) Oral two times a day  montelukast 10 milliGRAM(s) Oral at bedtime  pantoprazole    Tablet 40 milliGRAM(s) Oral before breakfast  senna 2 Tablet(s) Oral at bedtime  tacrolimus 0.5 milliGRAM(s) Oral at bedtime  tiotropium 18 MICROgram(s) Capsule 1 Capsule(s) Inhalation daily    MEDICATIONS  (PRN):  acetaminophen   Tablet. 650 milliGRAM(s) Oral every 6 hours PRN Moderate Pain (4 - 6)  dextrose Gel 1 Dose(s) Oral once PRN Blood Glucose LESS THAN 70 milliGRAM(s)/deciliter  glucagon  Injectable 1 milliGRAM(s) IntraMuscular once PRN Glucose LESS THAN 70 milligrams/deciliter  morphine  - Injectable 2 milliGRAM(s) IV Push every 4 hours PRN Moderate Pain (4 - 6)  ondansetron Injectable 4 milliGRAM(s) IV Push every 8 hours PRN Nausea and/or Vomiting  polyethylene glycol 3350 17 Gram(s) Oral daily PRN Constipation      Overnight events:    Vital Signs Last 24 Hrs  T(C): 36.3 (16 Mar 2018 05:26), Max: 36.3 (16 Mar 2018 05:26)  T(F): 97.4 (16 Mar 2018 05:26), Max: 97.4 (16 Mar 2018 05:26)  HR: 75 (16 Mar 2018 05:26) (75 - 99)  BP: 180/83 (16 Mar 2018 05:26) (164/79 - 180/83)  BP(mean): --  RR: 18 (16 Mar 2018 05:26) (18 - 19)  SpO2: 95% (15 Mar 2018 13:20) (95% - 95%)  CAPILLARY BLOOD GLUCOSE  270 (15 Mar 2018 21:56)  160 (15 Mar 2018 17:14)  110 (15 Mar 2018 11:22)        I&O's Summary    15 Mar 2018 07:01  -  16 Mar 2018 07:00  --------------------------------------------------------  IN: 120 mL / OUT: 0 mL / NET: 120 mL        Physical Exam:  -     General : Lying in bed in no acute distress  -      HEENT: NCAT  -      Cardiac: RRR  -      Pulm: CTA BL   -      GI: soft NTND  -      Musculoskeletal: nl ROM, no edema/cyanosis, R leg w/ dressing c/d/i , LUE w/ local redness to area superior to antecubital fossa  -      Neuro: A&O x3, no focal deficits        Labs:                        8.6    15.03 )-----------( 440      ( 15 Mar 2018 08:33 )             28.3             03-14    138  |  99  |  29<H>  ----------------------------<  242<H>  4.1   |  27  |  1.0    Ca    8.9      14 Mar 2018 08:44  Mg     2.3     03-14    TPro  5.6<L>  /  Alb  3.0  /  TBili  2.4<H>  /  DBili  0.9<H>  /  AST  37  /  ALT  80<H>  /  AlkPhos  143<H>  03-14    LIVER FUNCTIONS - ( 14 Mar 2018 08:44 )  Alb: 3.0 g/dL / Pro: 5.6 g/dL / ALK PHOS: 143 U/L / ALT: 80 U/L / AST: 37 U/L / GGT: x                 PT/INR - ( 15 Mar 2018 08:33 )   PT: 14.90 sec;   INR: 1.37 ratio         PTT - ( 14 Mar 2018 08:44 )  PTT:22.2 sec              Imaging:    ECG: NAOE. No new concerns or complaints. This AM satting 93 on RA at rest but desatted to 83 on RA on ambulation. O2 improved when pt place don 2L NC.     Patient is a 73y old  Female who presents with a chief complaint of right leg wound, needs debridement (04 Mar 2018 15:38)      PAST MEDICAL & SURGICAL HISTORY:  Essential hypertension  Autoimmune hepatitis treated with steroids  Asthma  DVT (deep venous thrombosis)  No significant past surgical history      MEDICATIONS  (STANDING):  ALBUTerol    90 MICROgram(s) HFA Inhaler 1 Puff(s) Inhalation every 4 hours  ALBUTerol/ipratropium for Nebulization 3 milliLiter(s) Nebulizer every 6 hours  BACItracin   Ointment 1 Application(s) Topical two times a day  dextrose 50% Injectable 12.5 Gram(s) IV Push once  dextrose 50% Injectable 25 Gram(s) IV Push once  dextrose 50% Injectable 25 Gram(s) IV Push once  docusate sodium 100 milliGRAM(s) Oral three times a day  heparin  Injectable 5000 Unit(s) SubCutaneous every 8 hours  hydrocortisone hemorrhoidal Suppository 1 Suppository(s) Rectal daily  insulin glargine Injectable (LANTUS) 10 Unit(s) SubCutaneous at bedtime  insulin lispro Injectable (HumaLOG) 5 Unit(s) SubCutaneous before breakfast  insulin lispro Injectable (HumaLOG) 5 Unit(s) SubCutaneous before lunch  insulin lispro Injectable (HumaLOG) 5 Unit(s) SubCutaneous before dinner  linezolid    Tablet 600 milliGRAM(s) Oral every 12 hours  methylPREDNISolone sodium succinate Injectable 60 milliGRAM(s) IV Push every 12 hours  metoprolol     tartrate 25 milliGRAM(s) Oral two times a day  montelukast 10 milliGRAM(s) Oral at bedtime  pantoprazole    Tablet 40 milliGRAM(s) Oral before breakfast  senna 2 Tablet(s) Oral at bedtime  tacrolimus 0.5 milliGRAM(s) Oral at bedtime  tiotropium 18 MICROgram(s) Capsule 1 Capsule(s) Inhalation daily    MEDICATIONS  (PRN):  acetaminophen   Tablet. 650 milliGRAM(s) Oral every 6 hours PRN Moderate Pain (4 - 6)  dextrose Gel 1 Dose(s) Oral once PRN Blood Glucose LESS THAN 70 milliGRAM(s)/deciliter  glucagon  Injectable 1 milliGRAM(s) IntraMuscular once PRN Glucose LESS THAN 70 milligrams/deciliter  morphine  - Injectable 2 milliGRAM(s) IV Push every 4 hours PRN Moderate Pain (4 - 6)  ondansetron Injectable 4 milliGRAM(s) IV Push every 8 hours PRN Nausea and/or Vomiting  polyethylene glycol 3350 17 Gram(s) Oral daily PRN Constipation    Vital Signs Last 24 Hrs  T(C): 36.3 (16 Mar 2018 05:26), Max: 36.3 (16 Mar 2018 05:26)  T(F): 97.4 (16 Mar 2018 05:26), Max: 97.4 (16 Mar 2018 05:26)  HR: 75 (16 Mar 2018 05:26) (75 - 99)  BP: 180/83 (16 Mar 2018 05:26) (164/79 - 180/83)  BP(mean): --  RR: 18 (16 Mar 2018 05:26) (18 - 19)  SpO2: 95% (15 Mar 2018 13:20) (95% - 95%)  CAPILLARY BLOOD GLUCOSE  270 (15 Mar 2018 21:56)  160 (15 Mar 2018 17:14)  110 (15 Mar 2018 11:22)      I&O's Summary    15 Mar 2018 07:01  -  16 Mar 2018 07:00  --------------------------------------------------------  IN: 120 mL / OUT: 0 mL / NET: 120 mL        Physical Exam:  -     General : Lying in bed in no acute distress  -      HEENT: NCAT  -      Cardiac: RRR  -      Pulm: CTA BL   -      GI: soft NTND  -      Musculoskeletal: nl ROM, no edema/cyanosis, R leg w/ dressing c/d/i , LUE w/ local redness to area superior to antecubital fossa  -      Neuro: A&O x3, no focal deficits        Labs:                        8.3    11.41 )-----------( 378      ( 16 Mar 2018 06:26 )             27.8   03-16    143  |  105  |  24<H>  ----------------------------<  97  4.3   |  25  |  0.9    Ca    8.1<L>      16 Mar 2018 06:26    PT/INR - ( 16 Mar 2018 06:26 )   PT: 13.60 sec;   INR: 1.25 ratio         PTT - ( 16 Mar 2018 06:26 )  PTT:36.1 sec

## 2018-03-16 NOTE — PROGRESS NOTE ADULT - SUBJECTIVE AND OBJECTIVE BOX
DARNELL, DONI  73y  Female  HPI:  Pt states she fell Monday and went for evaluation to [facility] and laceration was sutured and pt was discharged. Pt came back today for increasing pain in right LE. Patient denies calf pain, fevers, chest pain, SOB, abdominal pain, nausea, vomiting, diarrhea, dizziness, weakness. (27 Feb 2018 13:32)    MEDICATIONS  (STANDING):  ALBUTerol    90 MICROgram(s) HFA Inhaler 1 Puff(s) Inhalation every 4 hours  ALBUTerol/ipratropium for Nebulization 3 milliLiter(s) Nebulizer every 6 hours  BACItracin   Ointment 1 Application(s) Topical two times a day  dextrose 50% Injectable 12.5 Gram(s) IV Push once  dextrose 50% Injectable 25 Gram(s) IV Push once  dextrose 50% Injectable 25 Gram(s) IV Push once  docusate sodium 100 milliGRAM(s) Oral three times a day  heparin  Injectable 5000 Unit(s) SubCutaneous every 8 hours  hydrocortisone hemorrhoidal Suppository 1 Suppository(s) Rectal daily  insulin glargine Injectable (LANTUS) 10 Unit(s) SubCutaneous at bedtime  insulin lispro Injectable (HumaLOG) 5 Unit(s) SubCutaneous before breakfast  insulin lispro Injectable (HumaLOG) 5 Unit(s) SubCutaneous before lunch  insulin lispro Injectable (HumaLOG) 5 Unit(s) SubCutaneous before dinner  linezolid    Tablet 600 milliGRAM(s) Oral every 12 hours  methylPREDNISolone sodium succinate Injectable 60 milliGRAM(s) IV Push every 12 hours  metoprolol     tartrate 25 milliGRAM(s) Oral two times a day  montelukast 10 milliGRAM(s) Oral at bedtime  pantoprazole    Tablet 40 milliGRAM(s) Oral before breakfast  senna 2 Tablet(s) Oral at bedtime  tacrolimus 0.5 milliGRAM(s) Oral at bedtime  tiotropium 18 MICROgram(s) Capsule 1 Capsule(s) Inhalation daily    MEDICATIONS  (PRN):  acetaminophen   Tablet. 650 milliGRAM(s) Oral every 6 hours PRN Moderate Pain (4 - 6)  dextrose Gel 1 Dose(s) Oral once PRN Blood Glucose LESS THAN 70 milliGRAM(s)/deciliter  glucagon  Injectable 1 milliGRAM(s) IntraMuscular once PRN Glucose LESS THAN 70 milligrams/deciliter  morphine  - Injectable 2 milliGRAM(s) IV Push every 4 hours PRN Moderate Pain (4 - 6)  ondansetron Injectable 4 milliGRAM(s) IV Push every 8 hours PRN Nausea and/or Vomiting  polyethylene glycol 3350 17 Gram(s) Oral daily PRN Constipation    INTERVAL EVENTS: Patient seen today OOB without distress. Patient continues to be weak, easily fatigued. Patient desaturated walking to the bathroom.    T(C): 35.8 (03-16-18 @ 20:06), Max: 36.3 (03-16-18 @ 05:26)  HR: 102 (03-16-18 @ 20:06) (75 - 102)  BP: 168/79 (03-16-18 @ 20:06) (140/67 - 180/83)  RR: 20 (03-16-18 @ 20:06) (18 - 20)  SpO2: 83% (03-16-18 @ 11:59) (83% - 93%)  Wt(kg): --Vital Signs Last 24 Hrs  T(C): 35.8 (16 Mar 2018 20:06), Max: 36.3 (16 Mar 2018 05:26)  T(F): 96.4 (16 Mar 2018 20:06), Max: 97.4 (16 Mar 2018 05:26)  HR: 102 (16 Mar 2018 20:06) (75 - 102)  BP: 168/79 (16 Mar 2018 20:06) (140/67 - 180/83)  BP(mean): --  RR: 20 (16 Mar 2018 20:06) (18 - 20)  SpO2: 83% (16 Mar 2018 11:59) (83% - 93%)    PHYSICAL EXAM:  GENERAL: NAD, nervous, wearing O2  NECK: Supple, No JVD  CHEST/LUNG: few crackles at bases, no wheezing  HEART: S1, S2, Regular rate and rhythm;   ABDOMEN: Soft, Nontender, Nondistended; BS+  EXTREMITIES: trace edema edema  SKIN: No rashes or lesions    LABS:  Labs:                        8.3    11.41 )-----------( 378      ( 16 Mar 2018 06:26 )             27.8             03-16    143  |  105  |  24<H>  ----------------------------<  97  4.3   |  25  |  0.9    Ca    8.1<L>      16 Mar 2018 06:26      PT/INR - ( 16 Mar 2018 06:26 )   PT: 13.60 sec;   INR: 1.25 ratio         PTT - ( 16 Mar 2018 06:26 )  PTT:36.1 sec    RADIOLOGY & ADDITIONAL TESTS:  none new

## 2018-03-16 NOTE — PROGRESS NOTE ADULT - SUBJECTIVE AND OBJECTIVE BOX
DONI PATRICK  73y, Female      OVERNIGHT EVENTS:    none    VITALS:  T(F): 97.4, Max: 97.4 (03-16-18 @ 05:26)  HR: 75  BP: 180/83  RR: 18Vital Signs Last 24 Hrs  T(C): 36.3 (16 Mar 2018 05:26), Max: 36.3 (16 Mar 2018 05:26)  T(F): 97.4 (16 Mar 2018 05:26), Max: 97.4 (16 Mar 2018 05:26)  HR: 75 (16 Mar 2018 05:26) (75 - 99)  BP: 180/83 (16 Mar 2018 05:26) (164/79 - 180/83)  BP(mean): --  RR: 18 (16 Mar 2018 05:26) (18 - 19)  SpO2: 93% (16 Mar 2018 08:16) (93% - 95%)    TESTS & MEASUREMENTS:                        8.6    15.03 )-----------( 440      ( 15 Mar 2018 08:33 )             28.3                     RADIOLOGY & ADDITIONAL TESTS:    ANTIBIOTICS:  linezolid    Tablet 600 milliGRAM(s) Oral every 12 hours

## 2018-03-16 NOTE — PROGRESS NOTE ADULT - ASSESSMENT
74 yo female with hx of HTN, autoimmune hepatitis on tacrolimus, h/o PCP pneumonia, asthma, prothrombin gene mutation/DVT in 3/2017, p/w persistent pain s/p surgical wound closure after sustaining mechanical fall at home at [facility], admitted for worsening wound infection who was initially admitted under medical service s/p debridement. Her hospital course was complicated by acute hypoxic RF requiring ICU admission; s/p BAL thought to be 2/2 ORSA/fungal PNA. She was downgraded to the medical floors 3/13 and her problems are being managed as follows --      #Acute hypoxic Respiratory Failure, working diagnosis is 2/2 DAH and PNA (BAL on 3/9 revealing ORSA and yeast)   - c/w O2 therapy, continue to titrate down prn  - Pulm following, appreciate recs   - c/w solumedrol 60mg q12h  - c/w inhalers, nebs, montelukast  - ID following, appreciate recs  - c/w PO zyvox per ID recs    #LUE thrombophlebitis  - c/w warm compresses     #Hemorrhoids w/ mild bleeding  - c/w anusol  - monitor CBC , currently stable    #Hypercoagulability 2/2 prothrombin mutation, h/o DVT and PE 3/2017  - Coumadin on hold 2/2 DAH  - continue to monitor daily INR  - resume coumadin once cleared by pulm    #Right leg laceration s/p debridement 2/28  - local wound care per burn    #HTN, stable  - continue Norvasc and Metoprolol    #AIH   - GI following, appreciate recs  - c/w tacrolimus  - already on high dose steroid for pulm dx    #Diet: low sodium     #Activity: OOB as tolerated    #DVT/GI ppx: SEE (cleared by pulm since pt high risk)    #Dispo: pending resolution of PNA and improvement of pulm sxs, will assess ambulatory status and need for PT services 72 yo female with hx of HTN, autoimmune hepatitis on tacrolimus, h/o PCP pneumonia, asthma, prothrombin gene mutation/DVT in 3/2017, p/w persistent pain s/p surgical wound closure after sustaining mechanical fall at home at [facility], admitted for worsening wound infection who was initially admitted under medical service s/p debridement. Her hospital course was complicated by acute hypoxic RF requiring ICU admission; s/p BAL thought to be 2/2 ORSA/fungal PNA. She was downgraded to the medical floors 3/13 and her problems are being managed as follows --      #Acute hypoxic Respiratory Failure, working diagnosis is 2/2 DAH and PNA (BAL on 3/9 revealing ORSA and yeast)   - c/w O2 therapy, continue to titrate down prn  - Pulm following, appreciate recs   - c/w solumedrol 60mg q12h, f/u pulm for further taper recs  - c/w inhalers, nebs, montelukast  - ID following, appreciate recs - c/w PO zyvox q12h    #LUE thrombophlebitis  - c/w warm compresses     #Hemorrhoids w/ mild bleeding  - c/w anusol  - monitor CBC , currently stable    #Hypercoagulability 2/2 prothrombin mutation, h/o DVT and PE 3/2017  - Coumadin on hold 2/2 DAH  - continue to monitor daily INR  - resume coumadin once cleared by pulm    #Right leg laceration s/p debridement 2/28  - local wound care per burn    #HTN, stable  - continue Norvasc and Metoprolol    #AIH   - GI following, appreciate recs  - c/w tacrolimus  - already on high dose steroid for pulm dx    #Diet: low sodium     #Activity: OOB as tolerated    #DVT/GI ppx: SEE (cleared by pulm since pt high risk)    #Dispo: pending resolution of PNA and improvement of pulm sxs, will assess ambulatory status and need for PT services

## 2018-03-16 NOTE — PROGRESS NOTE ADULT - ASSESSMENT
IMPRESSION:    AHRF improved  DAH resolved  MRSA pneumonia treated  HO autoimmune hepatitis     PLAN:    CNS: no depressants    HEENT: Oral care    PULMONARY:  HOB @ 45 degrees.  Wean O2.  Slow Prednisone taper     CARDIOVASCULAR:  I = O    GI: GI prophylaxis.  Feeding      RENAL:  Follow up lytes.  Correct as needed    INFECTIOUS DISEASE: No need for antifungal from pulmonary stand point.       HEMATOLOGICAL:  DVT prophylaxis.    ENDOCRINE:  Follow up FS.  Insulin protocol if needed.    MUSCULOSKELETAL:    Can DC home from pulmonary stand point.  Prograf per GI

## 2018-03-16 NOTE — PROGRESS NOTE ADULT - ASSESSMENT
PNA.  BAL positive for ORSA    RLE ulcers without evidence of ongoing infection.  Left antecubital fossa with nonsuppurative phlebitis.    po Zyvox 600mg po q12h for  more days.   Recall prn please.

## 2018-03-16 NOTE — PROGRESS NOTE ADULT - EXTREMITIES COMMENTS
RLE lateally with edema/erythema.
RLE 2 clean ulcers along laterally aspect.   R antecubital fossa with erythema/edema. NO palpable cords, no suppuration.
RLE with hematoma.
clean ulcers RLE.

## 2018-03-16 NOTE — PROGRESS NOTE ADULT - ASSESSMENT
74 yo female with hx of HTN, autoimmune hepatitis on tacrolimus, h/o PCP pneumonia, asthma, prothrombin gene mutation/DVT in 3/2017, p/w persistent pain s/p surgical wound closure after sustaining mechanical fall at home at [facility], admitted for worsening wound infection who was initially admitted under medical service s/p debridement. Her hospital course was complicated by acute hypoxic RF requiring ICU admission; s/p BAL thought to be 2/2 ORSA/fungal PNA. She was downgraded to the medical floors 3/13 and her problems are being managed as follows --      #Acute Respiratory Failure, secondary to  DAH and PNA (BAL on 3/9 revealing ORSA and yeast)   - O2 therapy, continue to titrate down prn  - Pulm following, recommended decrease Solumedrol 60mg q12h  - Solumedrol weaned from 1000 mg to 120 mg in the course of a few days, concern for weakness  - continue respiratory treatments, Montelukast  - on Zyvox PO, Micafungin discontinued    #LUE thrombophlebitis  - warm compresses     #Hemorrhoids w/ mild bleeding  - Anusol ordered, feels better  - monitor CBC , currently stable    #Hypercoagulability 2/2 prothrombin mutation, h/o DVT and PE 3/2017  - Coumadin on hold 2/2 DAH  - resume Coumadin once cleared by pulm    #Right leg laceration s/p debridement 2/28  - local wound care per burn    #HTN, stable  - continue Norvasc and Metoprolol    #AIH   - GI following  - continue Tacrolimus  - on high dose steroid for pulm dx    #Diet: low sodium     #Activity: OOB as tolerated  - Rehab evalution pending    #DVT/GI ppx: SEE (cleared by pulm since pt high risk)    #Dispo: pending resolution of PNA  - await rehab recommendations    Case discussed in detail with house officer and primary nurse

## 2018-03-17 LAB
ANION GAP SERPL CALC-SCNC: 16 MMOL/L — HIGH (ref 7–14)
BUN SERPL-MCNC: 24 MG/DL — HIGH (ref 10–20)
CALCIUM SERPL-MCNC: 8.5 MG/DL — SIGNIFICANT CHANGE UP (ref 8.5–10.1)
CHLORIDE SERPL-SCNC: 106 MMOL/L — SIGNIFICANT CHANGE UP (ref 98–110)
CO2 SERPL-SCNC: 24 MMOL/L — SIGNIFICANT CHANGE UP (ref 17–32)
CREAT SERPL-MCNC: 0.9 MG/DL — SIGNIFICANT CHANGE UP (ref 0.7–1.5)
GLUCOSE SERPL-MCNC: 245 MG/DL — HIGH (ref 70–110)
HCT VFR BLD CALC: 29.8 % — LOW (ref 37–47)
HGB BLD-MCNC: 8.7 G/DL — LOW (ref 12–16)
MCHC RBC-ENTMCNC: 22.7 PG — LOW (ref 27–31)
MCHC RBC-ENTMCNC: 29.2 G/DL — LOW (ref 32–37)
MCV RBC AUTO: 77.6 FL — LOW (ref 81–99)
NRBC # BLD: 0 /100 WBCS — SIGNIFICANT CHANGE UP (ref 0–0)
PLATELET # BLD AUTO: 396 K/UL — SIGNIFICANT CHANGE UP (ref 130–400)
POTASSIUM SERPL-MCNC: 4.6 MMOL/L — SIGNIFICANT CHANGE UP (ref 3.5–5)
POTASSIUM SERPL-SCNC: 4.6 MMOL/L — SIGNIFICANT CHANGE UP (ref 3.5–5)
RBC # BLD: 3.84 M/UL — LOW (ref 4.2–5.4)
RBC # FLD: 19.9 % — HIGH (ref 11.5–14.5)
SODIUM SERPL-SCNC: 146 MMOL/L — SIGNIFICANT CHANGE UP (ref 135–146)
WBC # BLD: 14.88 K/UL — HIGH (ref 4.8–10.8)
WBC # FLD AUTO: 14.88 K/UL — HIGH (ref 4.8–10.8)

## 2018-03-17 RX ORDER — AMLODIPINE BESYLATE 2.5 MG/1
5 TABLET ORAL DAILY
Qty: 0 | Refills: 0 | Status: DISCONTINUED | OUTPATIENT
Start: 2018-03-17 | End: 2018-03-23

## 2018-03-17 RX ADMIN — MONTELUKAST 10 MILLIGRAM(S): 4 TABLET, CHEWABLE ORAL at 21:23

## 2018-03-17 RX ADMIN — PANTOPRAZOLE SODIUM 40 MILLIGRAM(S): 20 TABLET, DELAYED RELEASE ORAL at 05:33

## 2018-03-17 RX ADMIN — Medication 60 MILLIGRAM(S): at 05:31

## 2018-03-17 RX ADMIN — Medication 1 APPLICATION(S): at 05:32

## 2018-03-17 RX ADMIN — INSULIN GLARGINE 10 UNIT(S): 100 INJECTION, SOLUTION SUBCUTANEOUS at 21:24

## 2018-03-17 RX ADMIN — HEPARIN SODIUM 5000 UNIT(S): 5000 INJECTION INTRAVENOUS; SUBCUTANEOUS at 17:15

## 2018-03-17 RX ADMIN — Medication 25 MILLIGRAM(S): at 05:33

## 2018-03-17 RX ADMIN — Medication 3 MILLILITER(S): at 14:10

## 2018-03-17 RX ADMIN — Medication 5 UNIT(S): at 17:19

## 2018-03-17 RX ADMIN — Medication 25 MILLIGRAM(S): at 17:15

## 2018-03-17 RX ADMIN — HEPARIN SODIUM 5000 UNIT(S): 5000 INJECTION INTRAVENOUS; SUBCUTANEOUS at 05:32

## 2018-03-17 RX ADMIN — Medication 60 MILLIGRAM(S): at 17:17

## 2018-03-17 RX ADMIN — Medication 1 APPLICATION(S): at 17:14

## 2018-03-17 RX ADMIN — Medication 5 UNIT(S): at 11:36

## 2018-03-17 RX ADMIN — Medication 3 MILLILITER(S): at 08:16

## 2018-03-17 RX ADMIN — LINEZOLID 600 MILLIGRAM(S): 600 INJECTION, SOLUTION INTRAVENOUS at 05:33

## 2018-03-17 RX ADMIN — HEPARIN SODIUM 5000 UNIT(S): 5000 INJECTION INTRAVENOUS; SUBCUTANEOUS at 21:24

## 2018-03-17 RX ADMIN — LINEZOLID 600 MILLIGRAM(S): 600 INJECTION, SOLUTION INTRAVENOUS at 17:15

## 2018-03-17 RX ADMIN — TACROLIMUS 0.5 MILLIGRAM(S): 5 CAPSULE ORAL at 21:23

## 2018-03-17 RX ADMIN — AMLODIPINE BESYLATE 5 MILLIGRAM(S): 2.5 TABLET ORAL at 06:44

## 2018-03-17 RX ADMIN — Medication 3 MILLILITER(S): at 00:02

## 2018-03-17 RX ADMIN — Medication 3 MILLILITER(S): at 19:58

## 2018-03-17 NOTE — PROGRESS NOTE ADULT - ASSESSMENT
72 yo female with hx of HTN, autoimmune hepatitis on tacrolimus, h/o PCP pneumonia, asthma, prothrombin gene mutation/DVT in 3/2017, p/w persistent pain s/p surgical wound closure after sustaining mechanical fall at home at [facility], admitted for worsening wound infection who was initially admitted under medical service s/p debridement. Her hospital course was complicated by acute hypoxic RF requiring ICU admission; s/p BAL thought to be 2/2 ORSA/fungal PNA. She was downgraded to the medical floors 3/13 and her problems are being managed as follows --      #Acute hypoxic Respiratory Failure, working diagnosis is 2/2 DAH and PNA (BAL on 3/9 revealing ORSA and yeast)   - c/w O2 therapy, continue to titrate down prn  - Pulm following, appreciate recs   - c/w solumedrol 60mg q12h, f/u pulm for further taper recs  - c/w inhalers, nebs, montelukast  - ID following, appreciate recs - c/w PO zyvox q12h    #LUE thrombophlebitis  - c/w warm compresses     #Hemorrhoids w/ mild bleeding  - c/w anusol  - monitor CBC , currently stable    #Hypercoagulability 2/2 prothrombin mutation, h/o DVT and PE 3/2017  - Coumadin on hold 2/2 DAH  - continue to monitor daily INR  - resume coumadin once cleared by pulm    #Right leg laceration s/p debridement 2/28  - local wound care per burn    #HTN, stable  - continue Norvasc and Metoprolol    #AIH   - GI following, appreciate recs  - c/w tacrolimus  - already on high dose steroid for pulm dx    #Diet: low sodium     #Activity: OOB as tolerated    #DVT/GI ppx: SEE (cleared by pulm since pt high risk)    #Dispo: pending resolution of PNA and improvement of pulm sxs, will assess ambulatory status and need for PT services

## 2018-03-17 NOTE — PROGRESS NOTE ADULT - ASSESSMENT
72 yo female with hx of HTN, autoimmune hepatitis on tacrolimus, h/o PCP pneumonia, asthma, prothrombin gene mutation/DVT in 3/2017, p/w persistent pain s/p surgical wound closure after sustaining mechanical fall at home at [facility], admitted for worsening wound infection who was initially admitted under medical service s/p debridement. Her hospital course was complicated by acute hypoxic RF requiring ICU admission; s/p BAL thought to be 2/2 ORSA/fungal PNA. She was downgraded to the medical floors 3/13 and her problems are being managed as follows --      #Acute Respiratory Failure, secondary to  DAH and PNA (BAL on 3/9 revealing ORSA and yeast)   - O2 therapy, continue to titrate down prn  - on Solumedrol 60mg q12h  - continue respiratory treatments, Montelukast  - on Zyvox PO, Micafungin discontinued    #LUE thrombophlebitis  - warm compresses     #Hemorrhoids w/ mild bleeding  - Anusol ordered, feels better  - monitor CBC , currently stable    #Hypercoagulability 2/2 prothrombin mutation, h/o DVT and PE 3/2017  - Coumadin on hold 2/2 DAH  - resume Coumadin once cleared by pulm    #Right leg laceration s/p debridement 2/28  - local wound care per burn    #HTN, BP elevated today  - continue Norvasc and Metoprolol  - ma need to adjust medication    #AIH   - GI following  - continue Tacrolimus  - on high dose steroid for pulm dx    #Diet: low sodium     #Activity: OOB as tolerated  - Rehab evalution pending    #DVT/GI ppx: SEE (cleared by pulm since pt high risk)    #Dispo: pending resolution of PNA  - await rehab recommendations    Case discussed with covering house officer

## 2018-03-17 NOTE — PROGRESS NOTE ADULT - SUBJECTIVE AND OBJECTIVE BOX
BAM.     Patient is a 73y old  Female who presents with a chief complaint of right leg wound, needs debridement (04 Mar 2018 15:38)      PAST MEDICAL & SURGICAL HISTORY:  Essential hypertension  Autoimmune hepatitis treated with steroids  Asthma  DVT (deep venous thrombosis)  No significant past surgical history      MEDICATIONS  (STANDING):  ALBUTerol    90 MICROgram(s) HFA Inhaler 1 Puff(s) Inhalation every 4 hours  ALBUTerol/ipratropium for Nebulization 3 milliLiter(s) Nebulizer every 6 hours  amLODIPine   Tablet 5 milliGRAM(s) Oral daily  BACItracin   Ointment 1 Application(s) Topical two times a day  dextrose 50% Injectable 12.5 Gram(s) IV Push once  dextrose 50% Injectable 25 Gram(s) IV Push once  dextrose 50% Injectable 25 Gram(s) IV Push once  docusate sodium 100 milliGRAM(s) Oral three times a day  heparin  Injectable 5000 Unit(s) SubCutaneous every 8 hours  hydrocortisone hemorrhoidal Suppository 1 Suppository(s) Rectal daily  insulin glargine Injectable (LANTUS) 10 Unit(s) SubCutaneous at bedtime  insulin lispro Injectable (HumaLOG) 5 Unit(s) SubCutaneous before breakfast  insulin lispro Injectable (HumaLOG) 5 Unit(s) SubCutaneous before lunch  insulin lispro Injectable (HumaLOG) 5 Unit(s) SubCutaneous before dinner  linezolid    Tablet 600 milliGRAM(s) Oral every 12 hours  methylPREDNISolone sodium succinate Injectable 60 milliGRAM(s) IV Push every 12 hours  metoprolol     tartrate 25 milliGRAM(s) Oral two times a day  montelukast 10 milliGRAM(s) Oral at bedtime  pantoprazole    Tablet 40 milliGRAM(s) Oral before breakfast  senna 2 Tablet(s) Oral at bedtime  tacrolimus 0.5 milliGRAM(s) Oral at bedtime  tiotropium 18 MICROgram(s) Capsule 1 Capsule(s) Inhalation daily    MEDICATIONS  (PRN):  acetaminophen   Tablet. 650 milliGRAM(s) Oral every 6 hours PRN Moderate Pain (4 - 6)  dextrose Gel 1 Dose(s) Oral once PRN Blood Glucose LESS THAN 70 milliGRAM(s)/deciliter  glucagon  Injectable 1 milliGRAM(s) IntraMuscular once PRN Glucose LESS THAN 70 milligrams/deciliter  morphine  - Injectable 2 milliGRAM(s) IV Push every 4 hours PRN Moderate Pain (4 - 6)  ondansetron Injectable 4 milliGRAM(s) IV Push every 8 hours PRN Nausea and/or Vomiting  polyethylene glycol 3350 17 Gram(s) Oral daily PRN Constipation    Vital Signs Last 24 Hrs  T(C): 35.9 (17 Mar 2018 20:12), Max: 36.2 (17 Mar 2018 05:15)  T(F): 96.7 (17 Mar 2018 20:12), Max: 97.1 (17 Mar 2018 05:15)  HR: 76 (17 Mar 2018 20:12) (76 - 93)  BP: 126/56 (17 Mar 2018 20:12) (126/56 - 188/83)  BP(mean): --  RR: 20 (17 Mar 2018 20:12) (20 - 20)  SpO2: --  CAPILLARY BLOOD GLUCOSE  267 (17 Mar 2018 21:08)  201 (17 Mar 2018 16:35)  228 (17 Mar 2018 11:31)  162 (17 Mar 2018 07:37)          Physical Exam:    -     General : Lying in bed in no acute distress  -      HEENT: NCAT  -      Cardiac: RRR  -      Pulm: CTA BL   -      GI: soft NTND  -      Musculoskeletal: nl ROM, no edema/cyanosis  -      Neuro: A&O x3, no focal deficits        Labs:                        8.7    14.88 )-----------( 396      ( 17 Mar 2018 08:54 )             29.8             03-17    146  |  106  |  24<H>  ----------------------------<  245<H>  4.6   |  24  |  0.9    Ca    8.5      17 Mar 2018 08:54              PT/INR - ( 16 Mar 2018 06:26 )   PT: 13.60 sec;   INR: 1.25 ratio         PTT - ( 16 Mar 2018 06:26 )  PTT:36.1 sec              Imaging:    ECG:

## 2018-03-17 NOTE — PROGRESS NOTE ADULT - SUBJECTIVE AND OBJECTIVE BOX
DONI PATRICK  73y  Female  HPI:  Pt states she fell Monday and went for evaluation to [facility] and laceration was sutured and pt was discharged. Pt came back today for increasing pain in right LE. Patient denies calf pain, fevers, chest pain, SOB, abdominal pain, nausea, vomiting, diarrhea, dizziness, weakness. (27 Feb 2018 13:32)    MEDICATIONS  (STANDING):  ALBUTerol    90 MICROgram(s) HFA Inhaler 1 Puff(s) Inhalation every 4 hours  ALBUTerol/ipratropium for Nebulization 3 milliLiter(s) Nebulizer every 6 hours  amLODIPine   Tablet 5 milliGRAM(s) Oral daily  BACItracin   Ointment 1 Application(s) Topical two times a day  dextrose 50% Injectable 12.5 Gram(s) IV Push once  dextrose 50% Injectable 25 Gram(s) IV Push once  dextrose 50% Injectable 25 Gram(s) IV Push once  docusate sodium 100 milliGRAM(s) Oral three times a day  heparin  Injectable 5000 Unit(s) SubCutaneous every 8 hours  hydrocortisone hemorrhoidal Suppository 1 Suppository(s) Rectal daily  insulin glargine Injectable (LANTUS) 10 Unit(s) SubCutaneous at bedtime  insulin lispro Injectable (HumaLOG) 5 Unit(s) SubCutaneous before breakfast  insulin lispro Injectable (HumaLOG) 5 Unit(s) SubCutaneous before lunch  insulin lispro Injectable (HumaLOG) 5 Unit(s) SubCutaneous before dinner  linezolid    Tablet 600 milliGRAM(s) Oral every 12 hours  methylPREDNISolone sodium succinate Injectable 60 milliGRAM(s) IV Push every 12 hours  metoprolol     tartrate 25 milliGRAM(s) Oral two times a day  montelukast 10 milliGRAM(s) Oral at bedtime  pantoprazole    Tablet 40 milliGRAM(s) Oral before breakfast  senna 2 Tablet(s) Oral at bedtime  tacrolimus 0.5 milliGRAM(s) Oral at bedtime  tiotropium 18 MICROgram(s) Capsule 1 Capsule(s) Inhalation daily    MEDICATIONS  (PRN):  acetaminophen   Tablet. 650 milliGRAM(s) Oral every 6 hours PRN Moderate Pain (4 - 6)  dextrose Gel 1 Dose(s) Oral once PRN Blood Glucose LESS THAN 70 milliGRAM(s)/deciliter  glucagon  Injectable 1 milliGRAM(s) IntraMuscular once PRN Glucose LESS THAN 70 milligrams/deciliter  morphine  - Injectable 2 milliGRAM(s) IV Push every 4 hours PRN Moderate Pain (4 - 6)  ondansetron Injectable 4 milliGRAM(s) IV Push every 8 hours PRN Nausea and/or Vomiting  polyethylene glycol 3350 17 Gram(s) Oral daily PRN Constipation    INTERVAL EVENTS: Patient seen today much more comfortable. Patient without complaints.    T(C): 35.8 (03-17-18 @ 13:43), Max: 36.2 (03-17-18 @ 05:15)  HR: 81 (03-17-18 @ 13:43) (81 - 102)  BP: 162/97 (03-17-18 @ 13:43) (162/97 - 188/83)  RR: 20 (03-17-18 @ 13:43) (20 - 20)  SpO2: --  Wt(kg): --Vital Signs Last 24 Hrs  T(C): 35.8 (17 Mar 2018 13:43), Max: 36.2 (17 Mar 2018 05:15)  T(F): 96.4 (17 Mar 2018 13:43), Max: 97.1 (17 Mar 2018 05:15)  HR: 81 (17 Mar 2018 13:43) (81 - 102)  BP: 162/97 (17 Mar 2018 13:43) (162/97 - 188/83)  BP(mean): --  RR: 20 (17 Mar 2018 13:43) (20 - 20)  SpO2: --    PHYSICAL EXAM:  GENERAL:  NAD, wearing NC  NECK: Supple, No JVD  CHEST/LUNG: Decreased at bases, otherwise Clear  HEART: S1, S2, Regular rate and rhythm;   ABDOMEN: Soft, Nontender, Nondistended; Bowel sounds present  EXTREMITIES + edema  SKIN: RLE dressing in place    LABS:  Labs:                        8.7    14.88 )-----------( 396      ( 17 Mar 2018 08:54 )             29.8             03-17    146  |  106  |  24<H>  ----------------------------<  245<H>  4.6   |  24  |  0.9    Ca    8.5      17 Mar 2018 08:54            RADIOLOGY & ADDITIONAL TESTS:  none new

## 2018-03-18 LAB
ANION GAP SERPL CALC-SCNC: 15 MMOL/L — HIGH (ref 7–14)
BUN SERPL-MCNC: 22 MG/DL — HIGH (ref 10–20)
CALCIUM SERPL-MCNC: 8.1 MG/DL — LOW (ref 8.5–10.1)
CHLORIDE SERPL-SCNC: 108 MMOL/L — SIGNIFICANT CHANGE UP (ref 98–110)
CO2 SERPL-SCNC: 21 MMOL/L — SIGNIFICANT CHANGE UP (ref 17–32)
CREAT SERPL-MCNC: 1 MG/DL — SIGNIFICANT CHANGE UP (ref 0.7–1.5)
GLUCOSE SERPL-MCNC: 151 MG/DL — HIGH (ref 70–110)
HCT VFR BLD CALC: 27.9 % — LOW (ref 37–47)
HGB BLD-MCNC: 8.1 G/DL — LOW (ref 12–16)
MCHC RBC-ENTMCNC: 22.4 PG — LOW (ref 27–31)
MCHC RBC-ENTMCNC: 29 G/DL — LOW (ref 32–37)
MCV RBC AUTO: 77.3 FL — LOW (ref 81–99)
NRBC # BLD: 0 /100 WBCS — SIGNIFICANT CHANGE UP (ref 0–0)
PLATELET # BLD AUTO: 368 K/UL — SIGNIFICANT CHANGE UP (ref 130–400)
POTASSIUM SERPL-MCNC: 4.4 MMOL/L — SIGNIFICANT CHANGE UP (ref 3.5–5)
POTASSIUM SERPL-SCNC: 4.4 MMOL/L — SIGNIFICANT CHANGE UP (ref 3.5–5)
RBC # BLD: 3.61 M/UL — LOW (ref 4.2–5.4)
RBC # FLD: 19.9 % — HIGH (ref 11.5–14.5)
SODIUM SERPL-SCNC: 144 MMOL/L — SIGNIFICANT CHANGE UP (ref 135–146)
WBC # BLD: 12.76 K/UL — HIGH (ref 4.8–10.8)
WBC # FLD AUTO: 12.76 K/UL — HIGH (ref 4.8–10.8)

## 2018-03-18 RX ADMIN — Medication 60 MILLIGRAM(S): at 05:40

## 2018-03-18 RX ADMIN — HEPARIN SODIUM 5000 UNIT(S): 5000 INJECTION INTRAVENOUS; SUBCUTANEOUS at 05:39

## 2018-03-18 RX ADMIN — HEPARIN SODIUM 5000 UNIT(S): 5000 INJECTION INTRAVENOUS; SUBCUTANEOUS at 16:59

## 2018-03-18 RX ADMIN — Medication 25 MILLIGRAM(S): at 05:37

## 2018-03-18 RX ADMIN — Medication 3 MILLILITER(S): at 19:37

## 2018-03-18 RX ADMIN — PANTOPRAZOLE SODIUM 40 MILLIGRAM(S): 20 TABLET, DELAYED RELEASE ORAL at 05:41

## 2018-03-18 RX ADMIN — Medication 5 UNIT(S): at 11:46

## 2018-03-18 RX ADMIN — TACROLIMUS 0.5 MILLIGRAM(S): 5 CAPSULE ORAL at 21:58

## 2018-03-18 RX ADMIN — Medication 1 APPLICATION(S): at 05:38

## 2018-03-18 RX ADMIN — HEPARIN SODIUM 5000 UNIT(S): 5000 INJECTION INTRAVENOUS; SUBCUTANEOUS at 21:58

## 2018-03-18 RX ADMIN — Medication 5 UNIT(S): at 16:58

## 2018-03-18 RX ADMIN — Medication 60 MILLIGRAM(S): at 17:03

## 2018-03-18 RX ADMIN — AMLODIPINE BESYLATE 5 MILLIGRAM(S): 2.5 TABLET ORAL at 05:38

## 2018-03-18 RX ADMIN — Medication 5 UNIT(S): at 08:24

## 2018-03-18 RX ADMIN — INSULIN GLARGINE 10 UNIT(S): 100 INJECTION, SOLUTION SUBCUTANEOUS at 21:59

## 2018-03-18 RX ADMIN — Medication 3 MILLILITER(S): at 08:06

## 2018-03-18 RX ADMIN — MONTELUKAST 10 MILLIGRAM(S): 4 TABLET, CHEWABLE ORAL at 21:58

## 2018-03-18 RX ADMIN — LINEZOLID 600 MILLIGRAM(S): 600 INJECTION, SOLUTION INTRAVENOUS at 05:35

## 2018-03-18 RX ADMIN — LINEZOLID 600 MILLIGRAM(S): 600 INJECTION, SOLUTION INTRAVENOUS at 17:04

## 2018-03-18 RX ADMIN — Medication 25 MILLIGRAM(S): at 17:04

## 2018-03-18 RX ADMIN — Medication 3 MILLILITER(S): at 13:22

## 2018-03-18 NOTE — PROGRESS NOTE ADULT - ASSESSMENT
74 yo female with hx of HTN, autoimmune hepatitis on tacrolimus, h/o PCP pneumonia, asthma, prothrombin gene mutation/DVT in 3/2017, p/w persistent pain s/p surgical wound closure after sustaining mechanical fall at home at [facility], admitted for worsening wound infection who was initially admitted under medical service s/p debridement. Her hospital course was complicated by acute hypoxic RF requiring ICU admission; s/p BAL thought to be 2/2 ORSA/fungal PNA. She was downgraded to the medical floors 3/13 and her problems are being managed as follows --      #Acute Respiratory Failure, secondary to  DAH and PNA (BAL on 3/9 revealing ORSA and yeast)   - remains on O2 therapy  - on Solumedrol 60mg q12h  - continue respiratory treatments, Montelukast  - on Zyvox PO    #LUE thrombophlebitis  - warm compresses     #Hemorrhoids w/ mild bleeding  - Anusol ordered, feels better  - monitor CBC , currently stable    #Hypercoagulability 2/2 prothrombin mutation, h/o DVT and PE 3/2017  - Coumadin on hold 2/2 DAH  - resume Coumadin once cleared by pulm    #Right leg laceration s/p debridement 2/28  - local wound care per burn    #HTN, BP elevated today  - continue Norvasc and Metoprolol  - ma need to adjust medication    #AIH   - GI following  - continue Tacrolimus  - on high dose steroid for pulm dx    #Diet: low sodium     #Activity: OOB as tolerated  - Rehab evalution pending    #DVT/GI ppx: SEE (cleared by pulm since pt high risk)    #Disposition, D/c planning  - await rehab recommendations    Case discussed with covering house officer

## 2018-03-18 NOTE — PROGRESS NOTE ADULT - SUBJECTIVE AND OBJECTIVE BOX
DONI PATRICK  73y  Female  HPI:  Pt states she fell Monday and went for evaluation to [facility] and laceration was sutured and pt was discharged. Pt came back today for increasing pain in right LE. Patient denies calf pain, fevers, chest pain, SOB, abdominal pain, nausea, vomiting, diarrhea, dizziness, weakness. (27 Feb 2018 13:32)    MEDICATIONS  (STANDING):  ALBUTerol    90 MICROgram(s) HFA Inhaler 1 Puff(s) Inhalation every 4 hours  ALBUTerol/ipratropium for Nebulization 3 milliLiter(s) Nebulizer every 6 hours  amLODIPine   Tablet 5 milliGRAM(s) Oral daily  BACItracin   Ointment 1 Application(s) Topical two times a day  dextrose 50% Injectable 12.5 Gram(s) IV Push once  dextrose 50% Injectable 25 Gram(s) IV Push once  dextrose 50% Injectable 25 Gram(s) IV Push once  docusate sodium 100 milliGRAM(s) Oral three times a day  heparin  Injectable 5000 Unit(s) SubCutaneous every 8 hours  hydrocortisone hemorrhoidal Suppository 1 Suppository(s) Rectal daily  insulin glargine Injectable (LANTUS) 10 Unit(s) SubCutaneous at bedtime  insulin lispro Injectable (HumaLOG) 5 Unit(s) SubCutaneous before breakfast  insulin lispro Injectable (HumaLOG) 5 Unit(s) SubCutaneous before lunch  insulin lispro Injectable (HumaLOG) 5 Unit(s) SubCutaneous before dinner  linezolid    Tablet 600 milliGRAM(s) Oral every 12 hours  methylPREDNISolone sodium succinate Injectable 60 milliGRAM(s) IV Push every 12 hours  metoprolol     tartrate 25 milliGRAM(s) Oral two times a day  montelukast 10 milliGRAM(s) Oral at bedtime  pantoprazole    Tablet 40 milliGRAM(s) Oral before breakfast  senna 2 Tablet(s) Oral at bedtime  tacrolimus 0.5 milliGRAM(s) Oral at bedtime  tiotropium 18 MICROgram(s) Capsule 1 Capsule(s) Inhalation daily    MEDICATIONS  (PRN):  acetaminophen   Tablet. 650 milliGRAM(s) Oral every 6 hours PRN Moderate Pain (4 - 6)  dextrose Gel 1 Dose(s) Oral once PRN Blood Glucose LESS THAN 70 milliGRAM(s)/deciliter  glucagon  Injectable 1 milliGRAM(s) IntraMuscular once PRN Glucose LESS THAN 70 milligrams/deciliter  ondansetron Injectable 4 milliGRAM(s) IV Push every 8 hours PRN Nausea and/or Vomiting  polyethylene glycol 3350 17 Gram(s) Oral daily PRN Constipation    INTERVAL EVENTS: Patient seen today OOB in chair, eating well. Patient slept well. Patient still has dry cough. Patient states she ambulating more.    T(C): 35.8 (03-18-18 @ 13:19), Max: 36.1 (03-18-18 @ 05:34)  HR: 74 (03-18-18 @ 13:19) (74 - 91)  BP: 147/64 (03-18-18 @ 13:19) (126/56 - 179/82)  RR: 18 (03-18-18 @ 13:19) (18 - 20)  SpO2: --  Wt(kg): --Vital Signs Last 24 Hrs  T(C): 35.8 (18 Mar 2018 13:19), Max: 36.1 (18 Mar 2018 05:34)  T(F): 96.5 (18 Mar 2018 13:19), Max: 96.9 (18 Mar 2018 05:34)  HR: 74 (18 Mar 2018 13:19) (74 - 91)  BP: 147/64 (18 Mar 2018 13:19) (126/56 - 179/82)  BP(mean): --  RR: 18 (18 Mar 2018 13:19) (18 - 20)  SpO2: --    PHYSICAL EXAM:  GENERAL:   NECK: Supple, No JVD, Normal thyroid  CHEST/LUNG: Clear; No crackles or wheezing  HEART: S1, S2, Regular rate and rhythm;   ABDOMEN: Soft, Nontender, Nondistended; Bowel sounds present  EXTREMITIES: No clubbing, cyanosis, or edema  SKIN: No rashes or lesions    LABS:  Labs:                        8.1    12.76 )-----------( 368      ( 18 Mar 2018 06:22 )             27.9             03-18    144  |  108  |  22<H>  ----------------------------<  151<H>  4.4   |  21  |  1.0    Ca    8.1<L>      18 Mar 2018 06:22      RADIOLOGY & ADDITIONAL TESTS:  < from: Xray Chest 1 View AP/PA (03.16.18 @ 22:38) >  Impression:      Progression of right-sided opacity.    < end of copied text >

## 2018-03-19 LAB
ANION GAP SERPL CALC-SCNC: 13 MMOL/L — SIGNIFICANT CHANGE UP (ref 7–14)
BUN SERPL-MCNC: 24 MG/DL — HIGH (ref 10–20)
CALCIUM SERPL-MCNC: 8.5 MG/DL — SIGNIFICANT CHANGE UP (ref 8.5–10.1)
CHLORIDE SERPL-SCNC: 107 MMOL/L — SIGNIFICANT CHANGE UP (ref 98–110)
CO2 SERPL-SCNC: 23 MMOL/L — SIGNIFICANT CHANGE UP (ref 17–32)
CREAT SERPL-MCNC: 1 MG/DL — SIGNIFICANT CHANGE UP (ref 0.7–1.5)
GLUCOSE SERPL-MCNC: 142 MG/DL — HIGH (ref 70–110)
HCT VFR BLD CALC: 27.3 % — LOW (ref 37–47)
HGB BLD-MCNC: 8.2 G/DL — LOW (ref 12–16)
MCHC RBC-ENTMCNC: 23.2 PG — LOW (ref 27–31)
MCHC RBC-ENTMCNC: 30 G/DL — LOW (ref 32–37)
MCV RBC AUTO: 77.3 FL — LOW (ref 81–99)
NRBC # BLD: 0 /100 WBCS — SIGNIFICANT CHANGE UP (ref 0–0)
PLATELET # BLD AUTO: 366 K/UL — SIGNIFICANT CHANGE UP (ref 130–400)
POTASSIUM SERPL-MCNC: 4.7 MMOL/L — SIGNIFICANT CHANGE UP (ref 3.5–5)
POTASSIUM SERPL-SCNC: 4.7 MMOL/L — SIGNIFICANT CHANGE UP (ref 3.5–5)
RBC # BLD: 3.53 M/UL — LOW (ref 4.2–5.4)
RBC # FLD: 20 % — HIGH (ref 11.5–14.5)
SODIUM SERPL-SCNC: 143 MMOL/L — SIGNIFICANT CHANGE UP (ref 135–146)
WBC # BLD: 16.28 K/UL — HIGH (ref 4.8–10.8)
WBC # FLD AUTO: 16.28 K/UL — HIGH (ref 4.8–10.8)

## 2018-03-19 RX ORDER — NYSTATIN CREAM 100000 [USP'U]/G
1 CREAM TOPICAL EVERY 12 HOURS
Qty: 0 | Refills: 0 | Status: DISCONTINUED | OUTPATIENT
Start: 2018-03-19 | End: 2018-03-23

## 2018-03-19 RX ORDER — HYDROCORTISONE 1 %
1 OINTMENT (GRAM) TOPICAL DAILY
Qty: 0 | Refills: 0 | Status: DISCONTINUED | OUTPATIENT
Start: 2018-03-19 | End: 2018-03-23

## 2018-03-19 RX ADMIN — HEPARIN SODIUM 5000 UNIT(S): 5000 INJECTION INTRAVENOUS; SUBCUTANEOUS at 22:50

## 2018-03-19 RX ADMIN — HEPARIN SODIUM 5000 UNIT(S): 5000 INJECTION INTRAVENOUS; SUBCUTANEOUS at 13:04

## 2018-03-19 RX ADMIN — Medication 5 UNIT(S): at 11:33

## 2018-03-19 RX ADMIN — HEPARIN SODIUM 5000 UNIT(S): 5000 INJECTION INTRAVENOUS; SUBCUTANEOUS at 06:03

## 2018-03-19 RX ADMIN — AMLODIPINE BESYLATE 5 MILLIGRAM(S): 2.5 TABLET ORAL at 06:03

## 2018-03-19 RX ADMIN — Medication 1 SUPPOSITORY(S): at 06:18

## 2018-03-19 RX ADMIN — TACROLIMUS 0.5 MILLIGRAM(S): 5 CAPSULE ORAL at 22:49

## 2018-03-19 RX ADMIN — NYSTATIN CREAM 1 APPLICATION(S): 100000 CREAM TOPICAL at 17:40

## 2018-03-19 RX ADMIN — Medication 25 MILLIGRAM(S): at 06:03

## 2018-03-19 RX ADMIN — Medication 1 APPLICATION(S): at 17:39

## 2018-03-19 RX ADMIN — Medication 60 MILLIGRAM(S): at 17:39

## 2018-03-19 RX ADMIN — LINEZOLID 600 MILLIGRAM(S): 600 INJECTION, SOLUTION INTRAVENOUS at 17:41

## 2018-03-19 RX ADMIN — Medication 60 MILLIGRAM(S): at 06:04

## 2018-03-19 RX ADMIN — INSULIN GLARGINE 10 UNIT(S): 100 INJECTION, SOLUTION SUBCUTANEOUS at 22:50

## 2018-03-19 RX ADMIN — Medication 3 MILLILITER(S): at 08:09

## 2018-03-19 RX ADMIN — NYSTATIN CREAM 1 APPLICATION(S): 100000 CREAM TOPICAL at 06:05

## 2018-03-19 RX ADMIN — PANTOPRAZOLE SODIUM 40 MILLIGRAM(S): 20 TABLET, DELAYED RELEASE ORAL at 06:03

## 2018-03-19 RX ADMIN — Medication 1 APPLICATION(S): at 06:03

## 2018-03-19 RX ADMIN — Medication 5 UNIT(S): at 08:20

## 2018-03-19 RX ADMIN — Medication 3 MILLILITER(S): at 20:01

## 2018-03-19 RX ADMIN — LINEZOLID 600 MILLIGRAM(S): 600 INJECTION, SOLUTION INTRAVENOUS at 06:03

## 2018-03-19 RX ADMIN — Medication 5 UNIT(S): at 17:39

## 2018-03-19 RX ADMIN — MONTELUKAST 10 MILLIGRAM(S): 4 TABLET, CHEWABLE ORAL at 22:49

## 2018-03-19 RX ADMIN — Medication 3 MILLILITER(S): at 13:25

## 2018-03-19 RX ADMIN — Medication 25 MILLIGRAM(S): at 17:39

## 2018-03-19 NOTE — PROGRESS NOTE ADULT - SUBJECTIVE AND OBJECTIVE BOX
LENGTH OF HOSPITAL STAY: 20d    CHIEF COMPLAINT:   Patient is a 73y old  Female who presents with a chief complaint of right leg wound, needs debridement (04 Mar 2018 15:38)      Overnight events:    No acute events overnight    ALLERGIES:  No Known Allergies    MEDICATIONS:  STANDING MEDICATIONS  ALBUTerol    90 MICROgram(s) HFA Inhaler 1 Puff(s) Inhalation every 4 hours  ALBUTerol/ipratropium for Nebulization 3 milliLiter(s) Nebulizer every 6 hours  amLODIPine   Tablet 5 milliGRAM(s) Oral daily  BACItracin   Ointment 1 Application(s) Topical two times a day  dextrose 50% Injectable 12.5 Gram(s) IV Push once  dextrose 50% Injectable 25 Gram(s) IV Push once  dextrose 50% Injectable 25 Gram(s) IV Push once  docusate sodium 100 milliGRAM(s) Oral three times a day  heparin  Injectable 5000 Unit(s) SubCutaneous every 8 hours  hydrocortisone hemorrhoidal Suppository 1 Suppository(s) Rectal daily  insulin glargine Injectable (LANTUS) 10 Unit(s) SubCutaneous at bedtime  insulin lispro Injectable (HumaLOG) 5 Unit(s) SubCutaneous before breakfast  insulin lispro Injectable (HumaLOG) 5 Unit(s) SubCutaneous before lunch  insulin lispro Injectable (HumaLOG) 5 Unit(s) SubCutaneous before dinner  linezolid    Tablet 600 milliGRAM(s) Oral every 12 hours  methylPREDNISolone sodium succinate Injectable 60 milliGRAM(s) IV Push every 12 hours  metoprolol     tartrate 25 milliGRAM(s) Oral two times a day  montelukast 10 milliGRAM(s) Oral at bedtime  nystatin Powder 1 Application(s) Topical every 12 hours  pantoprazole    Tablet 40 milliGRAM(s) Oral before breakfast  senna 2 Tablet(s) Oral at bedtime  tacrolimus 0.5 milliGRAM(s) Oral at bedtime  tiotropium 18 MICROgram(s) Capsule 1 Capsule(s) Inhalation daily    PRN MEDICATIONS  acetaminophen   Tablet. 650 milliGRAM(s) Oral every 6 hours PRN  dextrose Gel 1 Dose(s) Oral once PRN  glucagon  Injectable 1 milliGRAM(s) IntraMuscular once PRN  ondansetron Injectable 4 milliGRAM(s) IV Push every 8 hours PRN  polyethylene glycol 3350 17 Gram(s) Oral daily PRN    VITALS:   T(F): 98  HR: 68  BP: 173/79  RR: 18  SpO2: --    LABS:                        8.1    12.76 )-----------( 368      ( 18 Mar 2018 06:22 )             27.9     03-18    144  |  108  |  22<H>  ----------------------------<  151<H>  4.4   |  21  |  1.0    Ca    8.1<L>      18 Mar 2018 06:22                      Cultures:      RADIOLOGY:    PHYSICAL EXAM:  GEN: No acute distress  HEENT:   LUNGS: Clear to auscultation bilaterally   HEART: S1/S2 present. RRR.   ABD: Soft, non-tender, non-distended. Bowel sounds present  EXT:  NEURO: AAOX3 LENGTH OF HOSPITAL STAY: 20d    CHIEF COMPLAINT:   Patient is a 73y old  Female who presents with a chief complaint of right leg wound, needs debridement (04 Mar 2018 15:38)      Overnight events:    No acute events overnight    ALLERGIES:  No Known Allergies    MEDICATIONS:  STANDING MEDICATIONS  ALBUTerol    90 MICROgram(s) HFA Inhaler 1 Puff(s) Inhalation every 4 hours  ALBUTerol/ipratropium for Nebulization 3 milliLiter(s) Nebulizer every 6 hours  amLODIPine   Tablet 5 milliGRAM(s) Oral daily  BACItracin   Ointment 1 Application(s) Topical two times a day  dextrose 50% Injectable 12.5 Gram(s) IV Push once  dextrose 50% Injectable 25 Gram(s) IV Push once  dextrose 50% Injectable 25 Gram(s) IV Push once  docusate sodium 100 milliGRAM(s) Oral three times a day  heparin  Injectable 5000 Unit(s) SubCutaneous every 8 hours  hydrocortisone hemorrhoidal Suppository 1 Suppository(s) Rectal daily  insulin glargine Injectable (LANTUS) 10 Unit(s) SubCutaneous at bedtime  insulin lispro Injectable (HumaLOG) 5 Unit(s) SubCutaneous before breakfast  insulin lispro Injectable (HumaLOG) 5 Unit(s) SubCutaneous before lunch  insulin lispro Injectable (HumaLOG) 5 Unit(s) SubCutaneous before dinner  linezolid    Tablet 600 milliGRAM(s) Oral every 12 hours  methylPREDNISolone sodium succinate Injectable 60 milliGRAM(s) IV Push every 12 hours  metoprolol     tartrate 25 milliGRAM(s) Oral two times a day  montelukast 10 milliGRAM(s) Oral at bedtime  nystatin Powder 1 Application(s) Topical every 12 hours  pantoprazole    Tablet 40 milliGRAM(s) Oral before breakfast  senna 2 Tablet(s) Oral at bedtime  tacrolimus 0.5 milliGRAM(s) Oral at bedtime  tiotropium 18 MICROgram(s) Capsule 1 Capsule(s) Inhalation daily    PRN MEDICATIONS  acetaminophen   Tablet. 650 milliGRAM(s) Oral every 6 hours PRN  dextrose Gel 1 Dose(s) Oral once PRN  glucagon  Injectable 1 milliGRAM(s) IntraMuscular once PRN  ondansetron Injectable 4 milliGRAM(s) IV Push every 8 hours PRN  polyethylene glycol 3350 17 Gram(s) Oral daily PRN    VITALS:   T(F): 98  HR: 68  BP: 173/79  RR: 18  SpO2: --    LABS:                        8.1    12.76 )-----------( 368      ( 18 Mar 2018 06:22 )             27.9     03-18    144  |  108  |  22<H>  ----------------------------<  151<H>  4.4   |  21  |  1.0    Ca    8.1<L>      18 Mar 2018 06:22      Cultures:      RADIOLOGY:    PHYSICAL EXAM:  GEN: No acute distress  HEENT: no lymphodenopathy  LUNGS: Clear to auscultation bilaterally   HEART: S1/S2 present. RRR.   ABD: Soft, non-tender, non-distended. Bowel sounds present  EXT: no pitting edema, no cyanosis, right leg wound bandage  NEURO: AAOX3

## 2018-03-19 NOTE — PROGRESS NOTE ADULT - ASSESSMENT
74 yo female with hx of HTN, autoimmune hepatitis on tacrolimus, h/o PCP pneumonia, asthma, prothrombin gene mutation/DVT in 3/2017, p/w persistent pain s/p surgical wound closure after sustaining mechanical fall at home at [facility], admitted for worsening wound infection who was initially admitted under medical service s/p debridement. Her hospital course was complicated by acute hypoxic RF requiring ICU admission; s/p BAL thought to be 2/2 ORSA/fungal PNA. She was downgraded to the medical floors 3/13 and her problems are being managed as follows --      #Acute Respiratory Failure, secondary to  DAH and PNA (BAL on 3/9 revealing ORSA and yeast)   - remains on O2 therapy  - on Solumedrol 60mg q12h  - continue respiratory treatments, Montelukast  - on Zyvox PO    #LUE thrombophlebitis  - warm compresses     #Hemorrhoids w/ mild bleeding  - Anusol ordered, feels better  - monitor CBC , currently stable    #Hypercoagulability 2/2 prothrombin mutation, h/o DVT and PE 3/2017  - Coumadin on hold 2/2 DAH  - resume Coumadin once cleared by pulm    #Right leg laceration s/p debridement 2/28  - local wound care per burn    #HTN, BP elevated today  - continue Norvasc and Metoprolol  - ma need to adjust medication    #AIH   - GI following  - continue Tacrolimus  - on high dose steroid for pulm dx    #Diet: low sodium     #Activity: OOB as tolerated  - Rehab evalution pending    #DVT/GI ppx: SEE (cleared by pulm since pt high risk)    #Disposition, D/c planning  - await rehab recommendations; Original evaluation suggested home w VN services?;     Case discussed with covering house officer

## 2018-03-19 NOTE — PROGRESS NOTE ADULT - ASSESSMENT
72 yo female with hx of HTN, autoimmune hepatitis on tacrolimus, h/o PCP pneumonia, asthma, prothrombin gene mutation/DVT in 3/2017, p/w persistent pain s/p surgical wound closure after sustaining mechanical fall at home at [facility], admitted for worsening wound infection who was initially admitted under medical service s/p debridement. Her hospital course was complicated by acute hypoxic RF requiring ICU admission; s/p BAL thought to be 2/2 ORSA/fungal PNA. She was downgraded to the medical floors 3/13 and her problems are being managed as follows --      #Acute Respiratory Failure, secondary to  DAH and PNA (BAL on 3/9 revealing ORSA and yeast)   - remains on O2 therapy  - on Solumedrol 60mg q12h  - continue respiratory treatments, Montelukast  - on Zyvox PO    #LUE thrombophlebitis  - warm compresses     #Hemorrhoids w/ mild bleeding  - Anusol ordered, feels better  - monitor CBC , currently stable    #Hypercoagulability 2/2 prothrombin mutation, h/o DVT and PE 3/2017  - Coumadin on hold 2/2 DAH  - resume Coumadin once cleared by pulm    #Right leg laceration s/p debridement 2/28  - local wound care per burn    #HTN, BP elevated today  - continue Norvasc and Metoprolol  - ma need to adjust medication    #AIH   - GI following  - continue Tacrolimus  - on high dose steroid for pulm dx    #Diet: low sodium     #Activity: OOB as tolerated  - Rehab evalution pending    #DVT/GI ppx: SEE (cleared by pulm since pt high risk)    #Disposition, D/c planning  - await rehab recommendations; Original evaluation suggested home w VN services?; 72 yo female with hx of HTN, autoimmune hepatitis on tacrolimus, h/o PCP pneumonia, asthma, prothrombin gene mutation/DVT in 3/2017, p/w persistent pain s/p surgical wound closure after sustaining mechanical fall at home at [facility], admitted for worsening wound infection who was initially admitted under medical service s/p debridement. Her hospital course was complicated by acute hypoxic RF requiring ICU admission; s/p BAL thought to be 2/2 ORSA/fungal PNA. She was downgraded to the medical floors 3/13 and her problems are being managed as follows --      1.Acute Respiratory Failure, secondary to  DAH and PNA (BAL on 3/9 revealing ORSA and yeast)   - remains on O2 therapy  - on Solumedrol 60mg q12h  - continue respiratory treatments, Montelukast  - on Zyvox PO  -will follow up pulm recs pertaining to steroid usage    2.LUE thrombophlebitis  - warm compresses     3.Hemorrhoids w/ mild bleeding  - Anusol ordered, feels better  - monitor CBC , currently stable    4.Hypercoagulability 2/2 prothrombin mutation, h/o DVT and PE 3/2017  - Coumadin on hold 2/2 DAH  - resume Coumadin once cleared by pulm    5..Right leg laceration s/p debridement 2/28  - local wound care per burn    6.HTN, BP elevated today  - continue Norvasc and Metoprolol  - ma need to adjust medication    7.AIH   - GI following  - continue Tacrolimus  - on high dose steroid for pulm dx    #Diet: low sodium     #Activity: OOB as tolerated  - Rehab evalution pending    #DVT/GI ppx: SEE (cleared by pulm since pt high risk)    #Disposition, D/c planning  - await rehab recommendations; Original evaluation suggested home w VN services?;

## 2018-03-19 NOTE — PROGRESS NOTE ADULT - SUBJECTIVE AND OBJECTIVE BOX
Chart reviewed, patient examined. Pertinent results reviewed.  Case discussed with   HD#[id number]  ELDER, DONI  73y  Female  HPI:  Pt states she fell Monday and went for evaluation to [facility] and laceration was sutured and pt was discharged. Pt came back today for increasing pain in right LE. Patient denies calf pain, fevers, chest pain, SOB, abdominal pain, nausea, vomiting, diarrhea, dizziness, weakness. (27 Feb 2018 13:32)  . HERE FOR DEBRIDEMENT OF INFECTED WOUND, DEVELOPED HYPOXIA-FOUND TO HAVE AN INTERSTITIAL PNEUMONITIS-PROBABLY RELATED TO HER IMMUNOSUPPRESSIVE MEDS. Also Alveolar Hemorrhage-AC was stopped.  Now on PO Zyvox, & tapering doses of steroids.      MEDICATIONS  (STANDING):  ALBUTerol    90 MICROgram(s) HFA Inhaler 1 Puff(s) Inhalation every 4 hours  ALBUTerol/ipratropium for Nebulization 3 milliLiter(s) Nebulizer every 6 hours  amLODIPine   Tablet 5 milliGRAM(s) Oral daily  BACItracin   Ointment 1 Application(s) Topical two times a day  dextrose 50% Injectable 12.5 Gram(s) IV Push once  dextrose 50% Injectable 25 Gram(s) IV Push once  dextrose 50% Injectable 25 Gram(s) IV Push once  docusate sodium 100 milliGRAM(s) Oral three times a day  heparin  Injectable 5000 Unit(s) SubCutaneous every 8 hours  hydrocortisone hemorrhoidal Suppository 1 Suppository(s) Rectal daily  insulin glargine Injectable (LANTUS) 10 Unit(s) SubCutaneous at bedtime  insulin lispro Injectable (HumaLOG) 5 Unit(s) SubCutaneous before breakfast  insulin lispro Injectable (HumaLOG) 5 Unit(s) SubCutaneous before lunch  insulin lispro Injectable (HumaLOG) 5 Unit(s) SubCutaneous before dinner  linezolid    Tablet 600 milliGRAM(s) Oral every 12 hours  methylPREDNISolone sodium succinate Injectable 60 milliGRAM(s) IV Push every 12 hours  metoprolol     tartrate 25 milliGRAM(s) Oral two times a day  montelukast 10 milliGRAM(s) Oral at bedtime  pantoprazole    Tablet 40 milliGRAM(s) Oral before breakfast  senna 2 Tablet(s) Oral at bedtime  tacrolimus 0.5 milliGRAM(s) Oral at bedtime  tiotropium 18 MICROgram(s) Capsule 1 Capsule(s) Inhalation daily    MEDICATIONS  (PRN):  acetaminophen   Tablet. 650 milliGRAM(s) Oral every 6 hours PRN Moderate Pain (4 - 6)  dextrose Gel 1 Dose(s) Oral once PRN Blood Glucose LESS THAN 70 milliGRAM(s)/deciliter  glucagon  Injectable 1 milliGRAM(s) IntraMuscular once PRN Glucose LESS THAN 70 milligrams/deciliter  ondansetron Injectable 4 milliGRAM(s) IV Push every 8 hours PRN Nausea and/or Vomiting  polyethylene glycol 3350 17 Gram(s) Oral daily PRN Constipation    INTERVAL EVENTS: Patient seen today OOB in chair, eating well. Patient slept well. Patient still has dry cough. Patient states she ambulating more.  Vital Signs Last 24 Hrs  T(C): 36.7 (19 Mar 2018 05:38), Max: 36.7 (19 Mar 2018 05:38)  T(F): 98 (19 Mar 2018 05:38), Max: 98 (19 Mar 2018 05:38)  HR: 68 (18 Mar 2018 20:00) (68 - 74)  BP: 173/79 (19 Mar 2018 05:38) (147/64 - 173/79)  BP(mean): --  RR: 18 (19 Mar 2018 05:38) (18 - 18)  SpO2: --  T(C): 35.8 (03-18-18 @ 13:19), Max: 36.1 (03-18-18 @ 05:34)  HR: 74 (03-18-18 @ 13:19) (74 - 91)  BP: 147/64 (03-18-18 @ 13:19) (126/56 - 179/82)  RR: 18 (03-18-18 @ 13:19) (18 - 20)  SpO2: --  PHYSICAL EXAM:  GENERAL:   NECK: Supple, No JVD, Normal thyroid  CHEST/LUNG: Clear; No crackles or wheezing  HEART: S1, S2, Regular rate and rhythm;   ABDOMEN: Soft, Nontender, Nondistended; Bowel sounds present  EXTREMITIES: No clubbing, cyanosis, or edema; clean wound RLE  SKIN: No rashes or lesions    LABS:  Labs:                        8.1    12.76 )-----------( 368      ( 18 Mar 2018 06:22 )             27.9             03-18    144  |  108  |  22<H>  ----------------------------<  151<H>  4.4   |  21  |  1.0    Ca    8.1<L>      18 Mar 2018 06:22      RADIOLOGY & ADDITIONAL TESTS:  < from: Xray Chest 1 View AP/PA (03.16.18 @ 22:38) >  Impression:      Progression of right-sided opacity.    < end of copied text >  o

## 2018-03-20 LAB
ANION GAP SERPL CALC-SCNC: 11 MMOL/L — SIGNIFICANT CHANGE UP (ref 7–14)
BASOPHILS # BLD AUTO: 0.01 K/UL — SIGNIFICANT CHANGE UP (ref 0–0.2)
BASOPHILS NFR BLD AUTO: 0.1 % — SIGNIFICANT CHANGE UP (ref 0–1)
BLD GP AB SCN SERPL QL: SIGNIFICANT CHANGE UP
BUN SERPL-MCNC: 25 MG/DL — HIGH (ref 10–20)
CALCIUM SERPL-MCNC: 8.4 MG/DL — LOW (ref 8.5–10.1)
CHLORIDE SERPL-SCNC: 107 MMOL/L — SIGNIFICANT CHANGE UP (ref 98–110)
CO2 SERPL-SCNC: 25 MMOL/L — SIGNIFICANT CHANGE UP (ref 17–32)
CREAT SERPL-MCNC: 0.9 MG/DL — SIGNIFICANT CHANGE UP (ref 0.7–1.5)
EOSINOPHIL # BLD AUTO: 0 K/UL — SIGNIFICANT CHANGE UP (ref 0–0.7)
EOSINOPHIL NFR BLD AUTO: 0 % — SIGNIFICANT CHANGE UP (ref 0–8)
GLUCOSE SERPL-MCNC: 138 MG/DL — HIGH (ref 70–110)
HCT VFR BLD CALC: 24.8 % — LOW (ref 37–47)
HGB BLD-MCNC: 7.3 G/DL — CRITICAL LOW (ref 12–16)
IMM GRANULOCYTES NFR BLD AUTO: 2.6 % — HIGH (ref 0.1–0.3)
LYMPHOCYTES # BLD AUTO: 0.79 K/UL — LOW (ref 1.2–3.4)
LYMPHOCYTES # BLD AUTO: 6.8 % — LOW (ref 20.5–51.1)
MAGNESIUM SERPL-MCNC: 2.1 MG/DL — SIGNIFICANT CHANGE UP (ref 1.8–2.4)
MCHC RBC-ENTMCNC: 22.4 PG — LOW (ref 27–31)
MCHC RBC-ENTMCNC: 29.4 G/DL — LOW (ref 32–37)
MCV RBC AUTO: 76.1 FL — LOW (ref 81–99)
MONOCYTES # BLD AUTO: 0.44 K/UL — SIGNIFICANT CHANGE UP (ref 0.1–0.6)
MONOCYTES NFR BLD AUTO: 3.8 % — SIGNIFICANT CHANGE UP (ref 1.7–9.3)
NEUTROPHILS # BLD AUTO: 10 K/UL — HIGH (ref 1.4–6.5)
NEUTROPHILS NFR BLD AUTO: 86.7 % — HIGH (ref 42.2–75.2)
NRBC # BLD: 0 /100 WBCS — SIGNIFICANT CHANGE UP (ref 0–0)
PHOSPHATE SERPL-MCNC: 3.2 MG/DL — SIGNIFICANT CHANGE UP (ref 2.1–4.9)
PLATELET # BLD AUTO: 290 K/UL — SIGNIFICANT CHANGE UP (ref 130–400)
POTASSIUM SERPL-MCNC: 4.5 MMOL/L — SIGNIFICANT CHANGE UP (ref 3.5–5)
POTASSIUM SERPL-SCNC: 4.5 MMOL/L — SIGNIFICANT CHANGE UP (ref 3.5–5)
RBC # BLD: 3.26 M/UL — LOW (ref 4.2–5.4)
RBC # FLD: 20 % — HIGH (ref 11.5–14.5)
SODIUM SERPL-SCNC: 143 MMOL/L — SIGNIFICANT CHANGE UP (ref 135–146)
TYPE + AB SCN PNL BLD: SIGNIFICANT CHANGE UP
WBC # BLD: 11.54 K/UL — HIGH (ref 4.8–10.8)
WBC # FLD AUTO: 11.54 K/UL — HIGH (ref 4.8–10.8)

## 2018-03-20 RX ADMIN — Medication 5 UNIT(S): at 12:10

## 2018-03-20 RX ADMIN — TACROLIMUS 0.5 MILLIGRAM(S): 5 CAPSULE ORAL at 22:11

## 2018-03-20 RX ADMIN — NYSTATIN CREAM 1 APPLICATION(S): 100000 CREAM TOPICAL at 06:25

## 2018-03-20 RX ADMIN — PANTOPRAZOLE SODIUM 40 MILLIGRAM(S): 20 TABLET, DELAYED RELEASE ORAL at 06:23

## 2018-03-20 RX ADMIN — LINEZOLID 600 MILLIGRAM(S): 600 INJECTION, SOLUTION INTRAVENOUS at 06:23

## 2018-03-20 RX ADMIN — HEPARIN SODIUM 5000 UNIT(S): 5000 INJECTION INTRAVENOUS; SUBCUTANEOUS at 22:13

## 2018-03-20 RX ADMIN — AMLODIPINE BESYLATE 5 MILLIGRAM(S): 2.5 TABLET ORAL at 06:22

## 2018-03-20 RX ADMIN — NYSTATIN CREAM 1 APPLICATION(S): 100000 CREAM TOPICAL at 17:22

## 2018-03-20 RX ADMIN — MONTELUKAST 10 MILLIGRAM(S): 4 TABLET, CHEWABLE ORAL at 22:10

## 2018-03-20 RX ADMIN — Medication 60 MILLIGRAM(S): at 17:22

## 2018-03-20 RX ADMIN — HEPARIN SODIUM 5000 UNIT(S): 5000 INJECTION INTRAVENOUS; SUBCUTANEOUS at 06:34

## 2018-03-20 RX ADMIN — Medication 3 MILLILITER(S): at 19:55

## 2018-03-20 RX ADMIN — Medication 1 APPLICATION(S): at 17:21

## 2018-03-20 RX ADMIN — Medication 25 MILLIGRAM(S): at 06:23

## 2018-03-20 RX ADMIN — Medication 100 MILLIGRAM(S): at 13:30

## 2018-03-20 RX ADMIN — Medication 5 UNIT(S): at 08:08

## 2018-03-20 RX ADMIN — Medication 100 MILLIGRAM(S): at 22:10

## 2018-03-20 RX ADMIN — Medication 60 MILLIGRAM(S): at 06:35

## 2018-03-20 RX ADMIN — Medication 1 SUPPOSITORY(S): at 13:30

## 2018-03-20 RX ADMIN — Medication 1 APPLICATION(S): at 06:34

## 2018-03-20 RX ADMIN — Medication 3 MILLILITER(S): at 08:25

## 2018-03-20 RX ADMIN — HEPARIN SODIUM 5000 UNIT(S): 5000 INJECTION INTRAVENOUS; SUBCUTANEOUS at 13:30

## 2018-03-20 RX ADMIN — INSULIN GLARGINE 10 UNIT(S): 100 INJECTION, SOLUTION SUBCUTANEOUS at 22:11

## 2018-03-20 RX ADMIN — Medication 25 MILLIGRAM(S): at 17:21

## 2018-03-20 RX ADMIN — Medication 5 UNIT(S): at 17:20

## 2018-03-20 RX ADMIN — LINEZOLID 600 MILLIGRAM(S): 600 INJECTION, SOLUTION INTRAVENOUS at 17:22

## 2018-03-20 RX ADMIN — SENNA PLUS 2 TABLET(S): 8.6 TABLET ORAL at 22:11

## 2018-03-20 NOTE — PROGRESS NOTE ADULT - SUBJECTIVE AND OBJECTIVE BOX
LENGTH OF HOSPITAL STAY: 21d    CHIEF COMPLAINT:   Patient is a 73y old  Female who presents with a chief complaint of right leg wound, needs debridement (04 Mar 2018 15:38)    Overnight events: Patient reporting coughing up blood tinged sputum as well as one bloody bowel movement.        ALLERGIES:  No Known Allergies    MEDICATIONS:  STANDING MEDICATIONS  ALBUTerol    90 MICROgram(s) HFA Inhaler 1 Puff(s) Inhalation every 4 hours  ALBUTerol/ipratropium for Nebulization 3 milliLiter(s) Nebulizer every 6 hours  amLODIPine   Tablet 5 milliGRAM(s) Oral daily  BACItracin   Ointment 1 Application(s) Topical two times a day  dextrose 50% Injectable 12.5 Gram(s) IV Push once  dextrose 50% Injectable 25 Gram(s) IV Push once  dextrose 50% Injectable 25 Gram(s) IV Push once  docusate sodium 100 milliGRAM(s) Oral three times a day  heparin  Injectable 5000 Unit(s) SubCutaneous every 8 hours  hydrocortisone hemorrhoidal Suppository 1 Suppository(s) Rectal daily  insulin glargine Injectable (LANTUS) 10 Unit(s) SubCutaneous at bedtime  insulin lispro Injectable (HumaLOG) 5 Unit(s) SubCutaneous before breakfast  insulin lispro Injectable (HumaLOG) 5 Unit(s) SubCutaneous before lunch  insulin lispro Injectable (HumaLOG) 5 Unit(s) SubCutaneous before dinner  linezolid    Tablet 600 milliGRAM(s) Oral every 12 hours  methylPREDNISolone sodium succinate Injectable 60 milliGRAM(s) IV Push every 12 hours  metoprolol     tartrate 25 milliGRAM(s) Oral two times a day  montelukast 10 milliGRAM(s) Oral at bedtime  nystatin Powder 1 Application(s) Topical every 12 hours  pantoprazole    Tablet 40 milliGRAM(s) Oral before breakfast  senna 2 Tablet(s) Oral at bedtime  tacrolimus 0.5 milliGRAM(s) Oral at bedtime  tiotropium 18 MICROgram(s) Capsule 1 Capsule(s) Inhalation daily    PRN MEDICATIONS  acetaminophen   Tablet. 650 milliGRAM(s) Oral every 6 hours PRN  dextrose Gel 1 Dose(s) Oral once PRN  glucagon  Injectable 1 milliGRAM(s) IntraMuscular once PRN  ondansetron Injectable 4 milliGRAM(s) IV Push every 8 hours PRN  polyethylene glycol 3350 17 Gram(s) Oral daily PRN    VITALS:   T(F): 99  HR: 80  BP: 157/75  RR: 18  SpO2: 98%    LABS:                        7.3    11.54 )-----------( 290      ( 20 Mar 2018 06:22 )             24.8     03-20    143  |  107  |  25<H>  ----------------------------<  138<H>  4.5   |  25  |  0.9    Ca    8.4<L>      20 Mar 2018 06:22  Phos  3.2     03-20  Mg     2.1     03-20      Cultures:      RADIOLOGY:    PHYSICAL EXAM:  GEN: No acute distress  LUNGS: Clear to auscultation bilaterally  HEART: S1/S2 present. RRR.   ABD: Soft, non-tender, non-distended. Bowel sounds present  EXT: right leg wound covered with ace bandage. no cyanosis, no pitting edema  NEURO: AAOX3

## 2018-03-20 NOTE — PROGRESS NOTE ADULT - ASSESSMENT
72 yo female with hx of HTN, autoimmune hepatitis on tacrolimus, h/o PCP pneumonia, asthma, prothrombin gene mutation/DVT in 3/2017, p/w persistent pain s/p surgical wound closure after sustaining mechanical fall at home at [facility], admitted for worsening wound infection who was initially admitted under medical service s/p debridement. Her hospital course was complicated by acute hypoxic RF requiring ICU admission; s/p BAL thought to be 2/2 ORSA/fungal PNA. She was downgraded to the medical floors 3/13 and her problems are being managed as follows --      1.Acute Respiratory Failure, secondary to  DAH and PNA (BAL on 3/9 revealing ORSA and yeast)   - remains on O2 therapy  - on Solumedrol 60mg q12h  - continue respiratory treatments, Montelukast  - on Zyvox PO  -will follow up pulm recs pertaining to steroid usage    2.LUE thrombophlebitis  - warm compresses     3.Hemorrhoids w/ mild bleeding  - Anusol ordered, feels better  - monitor CBc, drop in hgb, will type and screen and transfuse one unit of blood  -GI consulted, Dr. Nicole    4.Hypercoagulability 2/2 prothrombin mutation, h/o DVT and PE 3/2017  - Coumadin on hold 2/2 DAH  - resume Coumadin once cleared by pulm    5..Right leg laceration s/p debridement 2/28  - local wound care per burn    6.HTN, BP elevated today  - continue Norvasc and Metoprolol  - ma need to adjust medication    7.AIH   - GI following  - continue Tacrolimus  - on high dose steroid for pulm dx    #Diet: low sodium     #Activity: OOB as tolerated  #pt/rehab on board    #DVT/GI ppx: SEE (cleared by pulm since pt high risk)    #Disposition,  - await rehab recommendations; Original evaluation suggested home w VN services, will evaluate to see if SNF more appropriate given patient medical status.

## 2018-03-20 NOTE — PROGRESS NOTE ADULT - ASSESSMENT
72 yo female with hx of HTN, autoimmune hepatitis on tacrolimus, h/o PCP pneumonia, asthma, prothrombin gene mutation/DVT in 3/2017, p/w persistent pain s/p surgical wound closure after sustaining mechanical fall at home at [facility], admitted for worsening wound infection who was initially admitted under medical service s/p debridement. Her hospital course was complicated by acute hypoxic RF requiring ICU admission; s/p BAL thought to be 2/2 ORSA/fungal PNA. She was downgraded to the medical floors 3/13 and her problems are being managed as follows --      #Acute Respiratory Failure, secondary to  DAH and PNA (BAL on 3/9 revealing ORSA and yeast)   - remains on O2 therapy  - on Solumedrol 60mg q12h  - continue respiratory treatments, Montelukast  - on Zyvox PO    #LUE thrombophlebitis  - warm compresses     #Hemorrhoids with bleeding yesterday  - responsible for drop in H/H  - monitor CBC , currently stable    #Hypercoagulability 2/2 prothrombin mutation, h/o DVT and PE 3/2017  - Coumadin on hold 2/2 DAH  - now active drop? hold rx    #Right leg laceration s/p debridement 2/28  - local wound care per burn    #HTN  - continue Norvasc and Metoprolol  - ma need to adjust medication    #AIH   - GI following  - continue Tacrolimus  - on high dose steroid for pulm dx    #Diet: low sodium     #Activity: OOB as tolerated  - Rehab evaluation pending    #DVT/GI ppx: SEE (cleared by pulm since pt high risk)    #Disposition, D/c planning  - await rehab recommendations    Case discussed with housestaff

## 2018-03-20 NOTE — PROGRESS NOTE ADULT - SUBJECTIVE AND OBJECTIVE BOX
DONI PATRICK  73y  Female  HPI:  Pt states she fell Monday and went for evaluation to [facility] and laceration was sutured and pt was discharged. Pt came back today for increasing pain in right LE. Patient denies calf pain, fevers, chest pain, SOB, abdominal pain, nausea, vomiting, diarrhea, dizziness, weakness. (27 Feb 2018 13:32)    MEDICATIONS  (STANDING):  ALBUTerol    90 MICROgram(s) HFA Inhaler 1 Puff(s) Inhalation every 4 hours  ALBUTerol/ipratropium for Nebulization 3 milliLiter(s) Nebulizer every 6 hours  amLODIPine   Tablet 5 milliGRAM(s) Oral daily  BACItracin   Ointment 1 Application(s) Topical two times a day  dextrose 50% Injectable 12.5 Gram(s) IV Push once  dextrose 50% Injectable 25 Gram(s) IV Push once  dextrose 50% Injectable 25 Gram(s) IV Push once  docusate sodium 100 milliGRAM(s) Oral three times a day  heparin  Injectable 5000 Unit(s) SubCutaneous every 8 hours  hydrocortisone hemorrhoidal Suppository 1 Suppository(s) Rectal daily  insulin glargine Injectable (LANTUS) 10 Unit(s) SubCutaneous at bedtime  insulin lispro Injectable (HumaLOG) 5 Unit(s) SubCutaneous before breakfast  insulin lispro Injectable (HumaLOG) 5 Unit(s) SubCutaneous before lunch  insulin lispro Injectable (HumaLOG) 5 Unit(s) SubCutaneous before dinner  linezolid    Tablet 600 milliGRAM(s) Oral every 12 hours  methylPREDNISolone sodium succinate Injectable 60 milliGRAM(s) IV Push every 12 hours  metoprolol     tartrate 25 milliGRAM(s) Oral two times a day  montelukast 10 milliGRAM(s) Oral at bedtime  nystatin Powder 1 Application(s) Topical every 12 hours  pantoprazole    Tablet 40 milliGRAM(s) Oral before breakfast  senna 2 Tablet(s) Oral at bedtime  tacrolimus 0.5 milliGRAM(s) Oral at bedtime  tiotropium 18 MICROgram(s) Capsule 1 Capsule(s) Inhalation daily    MEDICATIONS  (PRN):  acetaminophen   Tablet. 650 milliGRAM(s) Oral every 6 hours PRN Moderate Pain (4 - 6)  dextrose Gel 1 Dose(s) Oral once PRN Blood Glucose LESS THAN 70 milliGRAM(s)/deciliter  glucagon  Injectable 1 milliGRAM(s) IntraMuscular once PRN Glucose LESS THAN 70 milligrams/deciliter  ondansetron Injectable 4 milliGRAM(s) IV Push every 8 hours PRN Nausea and/or Vomiting  polyethylene glycol 3350 17 Gram(s) Oral daily PRN Constipation    INTERVAL EVENTS: Patient seen this am, weak, more tired? does not have energy?    T(C): 37.2 (03-20-18 @ 04:34), Max: 37.2 (03-20-18 @ 04:34)  HR: 80 (03-20-18 @ 06:32) (74 - 80)  BP: 157/75 (03-20-18 @ 06:32) (153/76 - 163/74)  RR: 18 (03-19-18 @ 21:30) (18 - 18)  SpO2: 98% (03-19-18 @ 19:30) (98% - 98%)  Wt(kg): --Vital Signs Last 24 Hrs  T(C): 37.2 (20 Mar 2018 04:34), Max: 37.2 (20 Mar 2018 04:34)  T(F): 99 (20 Mar 2018 04:34), Max: 99 (20 Mar 2018 04:34)  HR: 80 (20 Mar 2018 06:32) (74 - 80)  BP: 157/75 (20 Mar 2018 06:32) (153/76 - 163/74)  BP(mean): --  RR: 18 (19 Mar 2018 21:30) (18 - 18)  SpO2: 98% (19 Mar 2018 19:30) (98% - 98%)    PHYSICAL EXAM:  GENERAL: NAD  NECK: Supple, No JVD  CHEST/LUNG: Clear; No crackles or wheezing  HEART: S1, S2, Regular rate and rhythm;   ABDOMEN: Soft, Nontender, Nondistended; Bowel sounds present  EXTREMITIES: No clubbing, cyanosis, or edema  SKIN: No rashes or lesions    LABS:   Labs:                        7.3    11.54 )-----------( 290      ( 20 Mar 2018 06:22 )             24.8             03-20    143  |  107  |  25<H>  ----------------------------<  138<H>  4.5   |  25  |  0.9    Ca    8.4<L>      20 Mar 2018 06:22  Phos  3.2     03-20  Mg     2.1     03-20                  RADIOLOGY & ADDITIONAL TESTS:  none

## 2018-03-20 NOTE — PROGRESS NOTE ADULT - SUBJECTIVE AND OBJECTIVE BOX
SUBJECTIVE: Patinet examined at bedside. complaints of sob that started yesterday    REVIEW OF SYSTEMS:  See HPI    PHYSICAL EXAM  Vital Signs Last 24 Hrs  T(C): 37.2 (20 Mar 2018 04:34), Max: 37.2 (20 Mar 2018 04:34)  T(F): 99 (20 Mar 2018 04:34), Max: 99 (20 Mar 2018 04:34)  HR: 80 (20 Mar 2018 06:32) (74 - 85)  BP: 157/75 (20 Mar 2018 06:32) (134/66 - 163/74)  BP(mean): --  RR: 18 (19 Mar 2018 21:30) (18 - 18)  SpO2: 98% (19 Mar 2018 19:30) (98% - 98%)    General: AAO x 3  HEENT: NAD          Lymph Nodes: NO lymphadenopathy  Neck:  Supple  Lungs: Clear bilaterally  Cardiovascular: S1S2  Abdomen: Soft, non tender  Extremities: NO edema or cyanosis  Skin: Intact  neuro: non focal          LABS:                          7.3    11.54 )-----------( 290      ( 20 Mar 2018 06:22 )             24.8                                               03-20    143  |  107  |  25<H>  ----------------------------<  138<H>  4.5   |  25  |  0.9    Ca    8.4<L>      20 Mar 2018 06:22  Phos  3.2     03-20  Mg     2.1     03-20                                                                                                                                                                                    MEDICATIONS  (STANDING):  ALBUTerol    90 MICROgram(s) HFA Inhaler 1 Puff(s) Inhalation every 4 hours  ALBUTerol/ipratropium for Nebulization 3 milliLiter(s) Nebulizer every 6 hours  amLODIPine   Tablet 5 milliGRAM(s) Oral daily  BACItracin   Ointment 1 Application(s) Topical two times a day  dextrose 50% Injectable 12.5 Gram(s) IV Push once  dextrose 50% Injectable 25 Gram(s) IV Push once  dextrose 50% Injectable 25 Gram(s) IV Push once  docusate sodium 100 milliGRAM(s) Oral three times a day  heparin  Injectable 5000 Unit(s) SubCutaneous every 8 hours  hydrocortisone hemorrhoidal Suppository 1 Suppository(s) Rectal daily  insulin glargine Injectable (LANTUS) 10 Unit(s) SubCutaneous at bedtime  insulin lispro Injectable (HumaLOG) 5 Unit(s) SubCutaneous before breakfast  insulin lispro Injectable (HumaLOG) 5 Unit(s) SubCutaneous before lunch  insulin lispro Injectable (HumaLOG) 5 Unit(s) SubCutaneous before dinner  linezolid    Tablet 600 milliGRAM(s) Oral every 12 hours  methylPREDNISolone sodium succinate Injectable 60 milliGRAM(s) IV Push every 12 hours  metoprolol     tartrate 25 milliGRAM(s) Oral two times a day  montelukast 10 milliGRAM(s) Oral at bedtime  nystatin Powder 1 Application(s) Topical every 12 hours  pantoprazole    Tablet 40 milliGRAM(s) Oral before breakfast  senna 2 Tablet(s) Oral at bedtime  tacrolimus 0.5 milliGRAM(s) Oral at bedtime  tiotropium 18 MICROgram(s) Capsule 1 Capsule(s) Inhalation daily    MEDICATIONS  (PRN):  acetaminophen   Tablet. 650 milliGRAM(s) Oral every 6 hours PRN Moderate Pain (4 - 6)  dextrose Gel 1 Dose(s) Oral once PRN Blood Glucose LESS THAN 70 milliGRAM(s)/deciliter  glucagon  Injectable 1 milliGRAM(s) IntraMuscular once PRN Glucose LESS THAN 70 milligrams/deciliter  ondansetron Injectable 4 milliGRAM(s) IV Push every 8 hours PRN Nausea and/or Vomiting  polyethylene glycol 3350 17 Gram(s) Oral daily PRN Constipation

## 2018-03-20 NOTE — PROGRESS NOTE ADULT - ASSESSMENT
IMPRESSION:    AHRF improved  DAH resolved  MRSA pneumonia treated  HO autoimmune hepatitis     PLAN:    CNS: no depressants    HEENT: Oral care    PULMONARY:  HOB @ 45 degrees.  Wean O2.  Slow Prednisone taper. Do a ct angio chest to rule out pe and a lower extremity doppler    CARDIOVASCULAR:  I = O, repeat BNP    GI: GI prophylaxis.  Feeding      RENAL:  Follow up lytes.  Correct as needed    INFECTIOUS DISEASE: completed antibiotic course    HEMATOLOGICAL:  DVT prophylaxis.    ENDOCRINE:  Follow up FS.  Insulin protocol if needed.    MUSCULOSKELETAL: IMPRESSION:    AHRF improved  DAH resolved  MRSA pneumonia treated  HO autoimmune hepatitis   New SOB    PLAN:    CNS: no depressants    HEENT: Oral care    PULMONARY:  HOB @ 45 degrees.  Wean O2.  Slow Prednisone taper. Do a ct angio chest to rule out pe and a lower extremity doppler    CARDIOVASCULAR:  I = O, repeat BNP    GI: GI prophylaxis.  Feeding      RENAL:  Follow up lytes.  Correct as needed    INFECTIOUS DISEASE: completed antibiotic course    HEMATOLOGICAL:  DVT prophylaxis.    ENDOCRINE:  Follow up FS.  Insulin protocol if needed.    MUSCULOSKELETAL:

## 2018-03-21 LAB
ANION GAP SERPL CALC-SCNC: 13 MMOL/L — SIGNIFICANT CHANGE UP (ref 7–14)
BASOPHILS # BLD AUTO: 0.01 K/UL — SIGNIFICANT CHANGE UP (ref 0–0.2)
BASOPHILS # BLD AUTO: 0.01 K/UL — SIGNIFICANT CHANGE UP (ref 0–0.2)
BASOPHILS # BLD AUTO: 0.02 K/UL — SIGNIFICANT CHANGE UP (ref 0–0.2)
BASOPHILS NFR BLD AUTO: 0.1 % — SIGNIFICANT CHANGE UP (ref 0–1)
BLD GP AB SCN SERPL QL: SIGNIFICANT CHANGE UP
BUN SERPL-MCNC: 24 MG/DL — HIGH (ref 10–20)
CALCIUM SERPL-MCNC: 8.5 MG/DL — SIGNIFICANT CHANGE UP (ref 8.5–10.1)
CHLORIDE SERPL-SCNC: 105 MMOL/L — SIGNIFICANT CHANGE UP (ref 98–110)
CO2 SERPL-SCNC: 23 MMOL/L — SIGNIFICANT CHANGE UP (ref 17–32)
CREAT SERPL-MCNC: 0.8 MG/DL — SIGNIFICANT CHANGE UP (ref 0.7–1.5)
EOSINOPHIL # BLD AUTO: 0 K/UL — SIGNIFICANT CHANGE UP (ref 0–0.7)
EOSINOPHIL NFR BLD AUTO: 0 % — SIGNIFICANT CHANGE UP (ref 0–8)
GLUCOSE SERPL-MCNC: 109 MG/DL — SIGNIFICANT CHANGE UP (ref 70–110)
HCT VFR BLD CALC: 27.5 % — LOW (ref 37–47)
HCT VFR BLD CALC: 27.7 % — LOW (ref 37–47)
HCT VFR BLD CALC: 27.8 % — LOW (ref 37–47)
HGB BLD-MCNC: 8.4 G/DL — LOW (ref 12–16)
HGB BLD-MCNC: 8.5 G/DL — LOW (ref 12–16)
HGB BLD-MCNC: 8.5 G/DL — LOW (ref 12–16)
IMM GRANULOCYTES NFR BLD AUTO: 1.7 % — HIGH (ref 0.1–0.3)
IMM GRANULOCYTES NFR BLD AUTO: 2.2 % — HIGH (ref 0.1–0.3)
IMM GRANULOCYTES NFR BLD AUTO: 2.2 % — HIGH (ref 0.1–0.3)
LYMPHOCYTES # BLD AUTO: 0.3 K/UL — LOW (ref 1.2–3.4)
LYMPHOCYTES # BLD AUTO: 0.44 K/UL — LOW (ref 1.2–3.4)
LYMPHOCYTES # BLD AUTO: 0.66 K/UL — LOW (ref 1.2–3.4)
LYMPHOCYTES # BLD AUTO: 2.6 % — LOW (ref 20.5–51.1)
LYMPHOCYTES # BLD AUTO: 4 % — LOW (ref 20.5–51.1)
LYMPHOCYTES # BLD AUTO: 4.9 % — LOW (ref 20.5–51.1)
MAGNESIUM SERPL-MCNC: 2 MG/DL — SIGNIFICANT CHANGE UP (ref 1.8–2.4)
MCHC RBC-ENTMCNC: 23.5 PG — LOW (ref 27–31)
MCHC RBC-ENTMCNC: 23.7 PG — LOW (ref 27–31)
MCHC RBC-ENTMCNC: 23.7 PG — LOW (ref 27–31)
MCHC RBC-ENTMCNC: 30.2 G/DL — LOW (ref 32–37)
MCHC RBC-ENTMCNC: 30.7 G/DL — LOW (ref 32–37)
MCHC RBC-ENTMCNC: 30.9 G/DL — LOW (ref 32–37)
MCV RBC AUTO: 76.6 FL — LOW (ref 81–99)
MCV RBC AUTO: 77.2 FL — LOW (ref 81–99)
MCV RBC AUTO: 77.7 FL — LOW (ref 81–99)
MONOCYTES # BLD AUTO: 0.2 K/UL — SIGNIFICANT CHANGE UP (ref 0.1–0.6)
MONOCYTES # BLD AUTO: 0.22 K/UL — SIGNIFICANT CHANGE UP (ref 0.1–0.6)
MONOCYTES # BLD AUTO: 0.47 K/UL — SIGNIFICANT CHANGE UP (ref 0.1–0.6)
MONOCYTES NFR BLD AUTO: 1.8 % — SIGNIFICANT CHANGE UP (ref 1.7–9.3)
MONOCYTES NFR BLD AUTO: 1.9 % — SIGNIFICANT CHANGE UP (ref 1.7–9.3)
MONOCYTES NFR BLD AUTO: 3.5 % — SIGNIFICANT CHANGE UP (ref 1.7–9.3)
NEUTROPHILS # BLD AUTO: 10.03 K/UL — HIGH (ref 1.4–6.5)
NEUTROPHILS # BLD AUTO: 10.81 K/UL — HIGH (ref 1.4–6.5)
NEUTROPHILS # BLD AUTO: 11.97 K/UL — HIGH (ref 1.4–6.5)
NEUTROPHILS NFR BLD AUTO: 89.3 % — HIGH (ref 42.2–75.2)
NEUTROPHILS NFR BLD AUTO: 92.4 % — HIGH (ref 42.2–75.2)
NEUTROPHILS NFR BLD AUTO: 93.2 % — HIGH (ref 42.2–75.2)
NRBC # BLD: 0 /100 WBCS — SIGNIFICANT CHANGE UP (ref 0–0)
PHOSPHATE SERPL-MCNC: 3.2 MG/DL — SIGNIFICANT CHANGE UP (ref 2.1–4.9)
PLATELET # BLD AUTO: 198 K/UL — SIGNIFICANT CHANGE UP (ref 130–400)
PLATELET # BLD AUTO: 255 K/UL — SIGNIFICANT CHANGE UP (ref 130–400)
PLATELET # BLD AUTO: 265 K/UL — SIGNIFICANT CHANGE UP (ref 130–400)
POTASSIUM SERPL-MCNC: 4.2 MMOL/L — SIGNIFICANT CHANGE UP (ref 3.5–5)
POTASSIUM SERPL-SCNC: 4.2 MMOL/L — SIGNIFICANT CHANGE UP (ref 3.5–5)
RBC # BLD: 3.58 M/UL — LOW (ref 4.2–5.4)
RBC # BLD: 3.59 M/UL — LOW (ref 4.2–5.4)
RBC # BLD: 3.59 M/UL — LOW (ref 4.2–5.4)
RBC # FLD: 19.4 % — HIGH (ref 11.5–14.5)
RBC # FLD: 19.6 % — HIGH (ref 11.5–14.5)
RBC # FLD: 19.8 % — HIGH (ref 11.5–14.5)
SODIUM SERPL-SCNC: 141 MMOL/L — SIGNIFICANT CHANGE UP (ref 135–146)
TYPE + AB SCN PNL BLD: SIGNIFICANT CHANGE UP
WBC # BLD: 10.87 K/UL — HIGH (ref 4.8–10.8)
WBC # BLD: 11.6 K/UL — HIGH (ref 4.8–10.8)
WBC # BLD: 13.41 K/UL — HIGH (ref 4.8–10.8)
WBC # FLD AUTO: 10.87 K/UL — HIGH (ref 4.8–10.8)
WBC # FLD AUTO: 11.6 K/UL — HIGH (ref 4.8–10.8)
WBC # FLD AUTO: 13.41 K/UL — HIGH (ref 4.8–10.8)

## 2018-03-21 RX ADMIN — Medication 60 MILLIGRAM(S): at 17:16

## 2018-03-21 RX ADMIN — Medication 1 APPLICATION(S): at 17:16

## 2018-03-21 RX ADMIN — HEPARIN SODIUM 5000 UNIT(S): 5000 INJECTION INTRAVENOUS; SUBCUTANEOUS at 13:13

## 2018-03-21 RX ADMIN — Medication 3 MILLILITER(S): at 08:23

## 2018-03-21 RX ADMIN — HEPARIN SODIUM 5000 UNIT(S): 5000 INJECTION INTRAVENOUS; SUBCUTANEOUS at 06:25

## 2018-03-21 RX ADMIN — Medication 100 MILLIGRAM(S): at 13:13

## 2018-03-21 RX ADMIN — TACROLIMUS 0.5 MILLIGRAM(S): 5 CAPSULE ORAL at 21:24

## 2018-03-21 RX ADMIN — Medication 3 MILLILITER(S): at 14:01

## 2018-03-21 RX ADMIN — Medication 100 MILLIGRAM(S): at 21:24

## 2018-03-21 RX ADMIN — PANTOPRAZOLE SODIUM 40 MILLIGRAM(S): 20 TABLET, DELAYED RELEASE ORAL at 06:24

## 2018-03-21 RX ADMIN — INSULIN GLARGINE 10 UNIT(S): 100 INJECTION, SOLUTION SUBCUTANEOUS at 21:57

## 2018-03-21 RX ADMIN — Medication 1 APPLICATION(S): at 06:42

## 2018-03-21 RX ADMIN — Medication 5 UNIT(S): at 08:25

## 2018-03-21 RX ADMIN — Medication 1 SUPPOSITORY(S): at 11:13

## 2018-03-21 RX ADMIN — LINEZOLID 600 MILLIGRAM(S): 600 INJECTION, SOLUTION INTRAVENOUS at 06:24

## 2018-03-21 RX ADMIN — Medication 5 UNIT(S): at 17:17

## 2018-03-21 RX ADMIN — MONTELUKAST 10 MILLIGRAM(S): 4 TABLET, CHEWABLE ORAL at 21:25

## 2018-03-21 RX ADMIN — LINEZOLID 600 MILLIGRAM(S): 600 INJECTION, SOLUTION INTRAVENOUS at 17:13

## 2018-03-21 RX ADMIN — Medication 25 MILLIGRAM(S): at 17:13

## 2018-03-21 RX ADMIN — Medication 25 MILLIGRAM(S): at 06:24

## 2018-03-21 RX ADMIN — Medication 100 MILLIGRAM(S): at 06:24

## 2018-03-21 RX ADMIN — NYSTATIN CREAM 1 APPLICATION(S): 100000 CREAM TOPICAL at 06:42

## 2018-03-21 RX ADMIN — Medication 5 UNIT(S): at 12:34

## 2018-03-21 RX ADMIN — Medication 60 MILLIGRAM(S): at 06:42

## 2018-03-21 RX ADMIN — NYSTATIN CREAM 1 APPLICATION(S): 100000 CREAM TOPICAL at 17:17

## 2018-03-21 RX ADMIN — AMLODIPINE BESYLATE 5 MILLIGRAM(S): 2.5 TABLET ORAL at 06:16

## 2018-03-21 NOTE — PROGRESS NOTE ADULT - SUBJECTIVE AND OBJECTIVE BOX
SUBJECTIVE:  Feels much better.  SP PRBC transfusion       REVIEW OF SYSTEMS:  See HPI    PHYSICAL EXAM  Vital Signs Last 24 Hrs  T(C): 36.4 (21 Mar 2018 06:00), Max: 36.4 (21 Mar 2018 06:00)  T(F): 97.5 (21 Mar 2018 06:00), Max: 97.5 (21 Mar 2018 06:00)  HR: 81 (21 Mar 2018 06:00) (73 - 86)  BP: 158/77 (21 Mar 2018 06:00) (142/66 - 166/73)  BP(mean): --  RR: 16 (21 Mar 2018 06:00) (16 - 18)  SpO2: 97% (20 Mar 2018 22:25) (97% - 97%)    General: In NAD  HEENT: LUIS A               Lymph Nodes: No Cervical LN    Lungs: [place] BS  Cardiovascular: Regular  Abdomen: Soft. + BS  Extremities: No clubbing   Skin: Warm  Neurological: Non focal      03-20-18 @ 07:01  -  03-21-18 @ 07:00  --------------------------------------------------------  IN:    Packed Red Blood Cells: 250 mL  Total IN: 250 mL    OUT:  Total OUT: 0 mL    Total NET: 250 mL          LABS:                          8.5    13.41 )-----------( 265      ( 21 Mar 2018 07:52 )             27.5                                               03-21    141  |  105  |  24<H>  ----------------------------<  109  4.2   |  23  |  0.8    Ca    8.5      21 Mar 2018 07:52  Phos  3.2     03-21  Mg     2.0     03-21                                                                                                                                                                                    MEDICATIONS  (STANDING):  ALBUTerol    90 MICROgram(s) HFA Inhaler 1 Puff(s) Inhalation every 4 hours  ALBUTerol/ipratropium for Nebulization 3 milliLiter(s) Nebulizer every 6 hours  amLODIPine   Tablet 5 milliGRAM(s) Oral daily  BACItracin   Ointment 1 Application(s) Topical two times a day  dextrose 50% Injectable 12.5 Gram(s) IV Push once  dextrose 50% Injectable 25 Gram(s) IV Push once  dextrose 50% Injectable 25 Gram(s) IV Push once  docusate sodium 100 milliGRAM(s) Oral three times a day  heparin  Injectable 5000 Unit(s) SubCutaneous every 8 hours  hydrocortisone hemorrhoidal Suppository 1 Suppository(s) Rectal daily  insulin glargine Injectable (LANTUS) 10 Unit(s) SubCutaneous at bedtime  insulin lispro Injectable (HumaLOG) 5 Unit(s) SubCutaneous before breakfast  insulin lispro Injectable (HumaLOG) 5 Unit(s) SubCutaneous before lunch  insulin lispro Injectable (HumaLOG) 5 Unit(s) SubCutaneous before dinner  linezolid    Tablet 600 milliGRAM(s) Oral every 12 hours  methylPREDNISolone sodium succinate Injectable 60 milliGRAM(s) IV Push every 12 hours  metoprolol     tartrate 25 milliGRAM(s) Oral two times a day  montelukast 10 milliGRAM(s) Oral at bedtime  nystatin Powder 1 Application(s) Topical every 12 hours  pantoprazole    Tablet 40 milliGRAM(s) Oral before breakfast  senna 2 Tablet(s) Oral at bedtime  tacrolimus 0.5 milliGRAM(s) Oral at bedtime  tiotropium 18 MICROgram(s) Capsule 1 Capsule(s) Inhalation daily    MEDICATIONS  (PRN):  acetaminophen   Tablet. 650 milliGRAM(s) Oral every 6 hours PRN Moderate Pain (4 - 6)  dextrose Gel 1 Dose(s) Oral once PRN Blood Glucose LESS THAN 70 milliGRAM(s)/deciliter  glucagon  Injectable 1 milliGRAM(s) IntraMuscular once PRN Glucose LESS THAN 70 milligrams/deciliter  ondansetron Injectable 4 milliGRAM(s) IV Push every 8 hours PRN Nausea and/or Vomiting  polyethylene glycol 3350 17 Gram(s) Oral daily PRN Constipation

## 2018-03-21 NOTE — CONSULT NOTE ADULT - CONSULT REASON
Autoimmune hepatitis on Tacrolimus which is suspected to be causing lung injury
brbpr / anemia
gait dysfunction
SOB, Abnormal CT chest finding
abscess.

## 2018-03-21 NOTE — PROGRESS NOTE ADULT - ASSESSMENT
74 yo female with hx of HTN, autoimmune hepatitis on tacrolimus, h/o PCP pneumonia, asthma, prothrombin gene mutation/DVT in 3/2017, p/w persistent pain s/p surgical wound closure after sustaining mechanical fall at home at [facility], admitted for worsening wound infection who was initially admitted under medical service s/p debridement. Her hospital course was complicated by acute hypoxic RF requiring ICU admission; s/p BAL thought to be 2/2 ORSA/fungal PNA. She was downgraded to the medical floors 3/13 and her problems are being managed as follows --      #Acute Respiratory Failure, secondary to  DAH and PNA (BAL on 3/9 revealing ORSA and yeast)   - remains on O2 therapy  - on Solumedrol 60mg -to Q24hrs  - continue respiratory treatments, Montelukast  - on Zyvox PO    #LUE thrombophlebitis  - warm compresses ; PMH of DVT-AC held 2/2 Alveolar hemorrhage    #Hemorrhoids with bleeding yesterday  - responsible for drop in H/H  - monitor CBC , currently stable    #Hypercoagulability 2/2 prothrombin mutation, h/o DVT and PE 3/2017  - Coumadin on hold 2/2 DAH  - now active drop? hold rx-consider restart in ? 2 weeks-review w Pulmonary; Can aim for INR-1.8-2.2    #Right leg laceration s/p debridement 2/28  - local wound care per burn    #HTN  - continue Norvasc and Metoprolol  - ma need to adjust medication    #AIH   - GI following  - continue Tacrolimus  - on high dose steroid for pulm dx    #Diet: low sodium     #Activity: OOB as tolerated  - Rehab evaluation pending    #DVT/GI ppx: SEE (cleared by pulm since pt high risk)    #Disposition, D/c planning  - await rehab recommendations-see 1 wk ago suggested to Home; ask to resee-? SNF for safety    Case discussed with housestaff 72 yo female with hx of HTN, autoimmune hepatitis on tacrolimus, h/o PCP pneumonia, asthma, prothrombin gene mutation/DVT in 3/2017, p/w persistent pain s/p surgical wound closure after sustaining mechanical fall at home at [facility], admitted for worsening wound infection who was initially admitted under medical service s/p debridement. Her hospital course was complicated by acute hypoxic RF requiring ICU admission; s/p BAL thought to be 2/2 ORSA/fungal PNA. She was downgraded to the medical floors 3/13 and her problems are being managed as follows --      #Acute Respiratory Failure, secondary to  DAH and PNA (BAL on 3/9 revealing ORSA and yeast)   - remains on O2 therapy  - on Solumedrol 60mg -to Q24hrs  - continue respiratory treatments, Montelukast  - on Zyvox PO; see pulmonary F/U    #LUE thrombophlebitis  - warm compresses ; PMH of DVT-AC held 2/2 Alveolar hemorrhage    #Hemorrhoids with bleeding yesterday  - responsible for drop in H/H  - monitor CBC , currently stable    #Hypercoagulability 2/2 prothrombin mutation, h/o DVT and PE 3/2017  - Coumadin on hold 2/2 DAH  - now active drop? hold rx-consider restart in ? 2 weeks-review w Pulmonary; Can aim for INR-1.8-2.2    #Right leg laceration s/p debridement 2/28  - local wound care per burn    #HTN  - continue Norvasc and Metoprolol  - ma need to adjust medication    #AIH   - GI following  - continue Tacrolimus  - on high dose steroid for pulm dx    #Diet: low sodium     #Activity: OOB as tolerated  - Rehab evaluation pending    #DVT/GI ppx: SEE (cleared by pulm since pt high risk)    #Disposition, D/c planning  - await rehab recommendations-see 1 wk ago suggested to Home; ask to resee-? SNF for safety    Case discussed with housestaff

## 2018-03-21 NOTE — PROGRESS NOTE ADULT - ASSESSMENT
IMPRESSION:    AHRF resolved  DAH resolved  MRSA pneumonia treated  HO autoimmune hepatitis   New SOB, resolved.  No VTE     PLAN:    CNS: no depressants    HEENT: Oral care    PULMONARY:  HOB @ 45 degrees.  Wean O2.  Solumedrol once daily     CARDIOVASCULAR:  I < O,     GI: GI prophylaxis.  Feeding      RENAL:  Follow up lytes.  Correct as needed    INFECTIOUS DISEASE:     HEMATOLOGICAL:  DVT prophylaxis.    ENDOCRINE:  Follow up FS.      MUSCULOSKELETAL:    OOB to chair

## 2018-03-21 NOTE — CONSULT NOTE ADULT - SUBJECTIVE AND OBJECTIVE BOX
Chief Complaint: Patient is a 73y old  Female who presents with a chief complaint of right leg wound, needs debridement (04 Mar 2018 15:38) found to have episode of brbpr    mild anemia  recent h/o alveolar hemmorage, posasible pulmonary edema/ fibrosis on catr chest      Review Of Systems:  denies n/v/c/d abdomenal pain , melena    Medications:  acetaminophen   Tablet. 650 milliGRAM(s) Oral every 6 hours PRN  ALBUTerol    90 MICROgram(s) HFA Inhaler 1 Puff(s) Inhalation every 4 hours  ALBUTerol/ipratropium for Nebulization 3 milliLiter(s) Nebulizer every 6 hours  amLODIPine   Tablet 5 milliGRAM(s) Oral daily  BACItracin   Ointment 1 Application(s) Topical two times a day  dextrose 50% Injectable 12.5 Gram(s) IV Push once  dextrose 50% Injectable 25 Gram(s) IV Push once  dextrose 50% Injectable 25 Gram(s) IV Push once  dextrose Gel 1 Dose(s) Oral once PRN  docusate sodium 100 milliGRAM(s) Oral three times a day  glucagon  Injectable 1 milliGRAM(s) IntraMuscular once PRN  heparin  Injectable 5000 Unit(s) SubCutaneous every 8 hours  hydrocortisone hemorrhoidal Suppository 1 Suppository(s) Rectal daily  insulin glargine Injectable (LANTUS) 10 Unit(s) SubCutaneous at bedtime  insulin lispro Injectable (HumaLOG) 5 Unit(s) SubCutaneous before breakfast  insulin lispro Injectable (HumaLOG) 5 Unit(s) SubCutaneous before lunch  insulin lispro Injectable (HumaLOG) 5 Unit(s) SubCutaneous before dinner  linezolid    Tablet 600 milliGRAM(s) Oral every 12 hours  methylPREDNISolone sodium succinate Injectable 60 milliGRAM(s) IV Push daily  metoprolol     tartrate 25 milliGRAM(s) Oral two times a day  montelukast 10 milliGRAM(s) Oral at bedtime  nystatin Powder 1 Application(s) Topical every 12 hours  ondansetron Injectable 4 milliGRAM(s) IV Push every 8 hours PRN  pantoprazole    Tablet 40 milliGRAM(s) Oral before breakfast  polyethylene glycol 3350 17 Gram(s) Oral daily PRN  senna 2 Tablet(s) Oral at bedtime  tacrolimus 0.5 milliGRAM(s) Oral at bedtime  tiotropium 18 MICROgram(s) Capsule 1 Capsule(s) Inhalation daily      PMHX/PSHX:  Essential hypertension  Autoimmune hepatitis treated with steroids  Asthma  DVT (deep venous thrombosis)  No significant past surgical history      Family history:  No pertinent family history in first degree relatives      Social History:       Allergies:  No Known Allergies            PHYSICAL EXAM:   Vital Signs:  Vital Signs Last 24 Hrs  T(C): 36.4 (21 Mar 2018 06:00), Max: 36.4 (21 Mar 2018 06:00)  T(F): 97.5 (21 Mar 2018 06:00), Max: 97.5 (21 Mar 2018 06:00)  HR: 81 (21 Mar 2018 06:00) (73 - 86)  BP: 158/77 (21 Mar 2018 06:00) (142/66 - 166/73)  BP(mean): --  RR: 16 (21 Mar 2018 06:00) (16 - 18)  SpO2: 97% (20 Mar 2018 22:25) (97% - 97%)  Daily     Daily     T(C): 36.4 (03-21-18 @ 06:00), Max: 36.4 (03-21-18 @ 06:00)  HR: 81 (03-21-18 @ 06:00) (73 - 86)  BP: 158/77 (03-21-18 @ 06:00) (142/66 - 166/73)  RR: 16 (03-21-18 @ 06:00) (16 - 18)  SpO2: 97% (03-20-18 @ 22:25) (97% - 97%)    GENERAL:  Appears stated age, well-groomed, well-nourished, no distress  ABDOMEN:  Soft, non-tender, non-distended, normoactive bowel sounds,  no masses ,no hepato-splenomegaly, no signs of chronic liver disease        LABS:                        8.5    13.41 )-----------( 265      ( 21 Mar 2018 07:52 )             27.5     03-21    141  |  105  |  24<H>  ----------------------------<  109  4.2   |  23  |  0.8    Ca    8.5      21 Mar 2018 07:52  Phos  3.2     03-21  Mg     2.0     03-21                Imaging:

## 2018-03-21 NOTE — PROGRESS NOTE ADULT - SUBJECTIVE AND OBJECTIVE BOX
LENGTH OF HOSPITAL STAY: 22d    CHIEF COMPLAINT:   Patient is a 73y old  Female who presents with a chief complaint of right leg wound, needs debridement (04 Mar 2018 15:38)    ALLERGIES:  No Known Allergies    MEDICATIONS:  STANDING MEDICATIONS  ALBUTerol    90 MICROgram(s) HFA Inhaler 1 Puff(s) Inhalation every 4 hours  ALBUTerol/ipratropium for Nebulization 3 milliLiter(s) Nebulizer every 6 hours  amLODIPine   Tablet 5 milliGRAM(s) Oral daily  BACItracin   Ointment 1 Application(s) Topical two times a day  dextrose 50% Injectable 12.5 Gram(s) IV Push once  dextrose 50% Injectable 25 Gram(s) IV Push once  dextrose 50% Injectable 25 Gram(s) IV Push once  docusate sodium 100 milliGRAM(s) Oral three times a day  heparin  Injectable 5000 Unit(s) SubCutaneous every 8 hours  hydrocortisone hemorrhoidal Suppository 1 Suppository(s) Rectal daily  insulin glargine Injectable (LANTUS) 10 Unit(s) SubCutaneous at bedtime  insulin lispro Injectable (HumaLOG) 5 Unit(s) SubCutaneous before breakfast  insulin lispro Injectable (HumaLOG) 5 Unit(s) SubCutaneous before lunch  insulin lispro Injectable (HumaLOG) 5 Unit(s) SubCutaneous before dinner  linezolid    Tablet 600 milliGRAM(s) Oral every 12 hours  methylPREDNISolone sodium succinate Injectable 60 milliGRAM(s) IV Push every 12 hours  metoprolol     tartrate 25 milliGRAM(s) Oral two times a day  montelukast 10 milliGRAM(s) Oral at bedtime  nystatin Powder 1 Application(s) Topical every 12 hours  pantoprazole    Tablet 40 milliGRAM(s) Oral before breakfast  senna 2 Tablet(s) Oral at bedtime  tacrolimus 0.5 milliGRAM(s) Oral at bedtime  tiotropium 18 MICROgram(s) Capsule 1 Capsule(s) Inhalation daily    PRN MEDICATIONS  acetaminophen   Tablet. 650 milliGRAM(s) Oral every 6 hours PRN  dextrose Gel 1 Dose(s) Oral once PRN  glucagon  Injectable 1 milliGRAM(s) IntraMuscular once PRN  ondansetron Injectable 4 milliGRAM(s) IV Push every 8 hours PRN  polyethylene glycol 3350 17 Gram(s) Oral daily PRN    VITALS:   T(F): 97.5  HR: 81  BP: 158/77  RR: 16  SpO2: 97%    LABS:                        8.5    13.41 )-----------( 265      ( 21 Mar 2018 07:52 )             27.5     03-21    141  |  105  |  24<H>  ----------------------------<  109  4.2   |  23  |  0.8    Ca    8.5      21 Mar 2018 07:52  Phos  3.2     03-21  Mg     2.0     03-21      Cultures:      RADIOLOGY:    PHYSICAL EXAM:  GEN: No acute distress  LUNGS: wheezing bilaterally in upper and lower lung fields  HEART: S1/S2 present. RRR.   ABD: Soft, non-tender, non-distended. Bowel sounds present  EXT: no cyanosis, no pitting edema  NEURO: AAOX3

## 2018-03-21 NOTE — CONSULT NOTE ADULT - ASSESSMENT
ASSESSMENT- lower gi bleed appears self limited ddx diverticulosis/ hemmoroidal/ neoplasia/   recent alveolar bleed    Plan- observation for now  cbc q 12 hours   pulmonary optimization  colonoscopy as oputpatient

## 2018-03-21 NOTE — PROGRESS NOTE ADULT - SUBJECTIVE AND OBJECTIVE BOX
DONI PATRICK  73y  Female  HPI:  Pt states she fell Monday and went for evaluation to [facility] and laceration was sutured and pt was discharged. Pt came back today for increasing pain in right LE. Patient denies calf pain, fevers, chest pain, SOB, abdominal pain, nausea, vomiting, diarrhea, dizziness, weakness. (27 Feb 2018 13:32); course has been complicated by ARF, alveolar hemorrhage, PNA, hi dose steroids w myopathy. Pt/staff now report that she fell yesterday. "legs went out" while walking w RW. Superficial bruise to L elbow    MEDICATIONS  (STANDING):  ALBUTerol    90 MICROgram(s) HFA Inhaler 1 Puff(s) Inhalation every 4 hours  ALBUTerol/ipratropium for Nebulization 3 milliLiter(s) Nebulizer every 6 hours  amLODIPine   Tablet 5 milliGRAM(s) Oral daily  BACItracin   Ointment 1 Application(s) Topical two times a day  dextrose 50% Injectable 12.5 Gram(s) IV Push once  dextrose 50% Injectable 25 Gram(s) IV Push once  dextrose 50% Injectable 25 Gram(s) IV Push once  docusate sodium 100 milliGRAM(s) Oral three times a day  heparin  Injectable 5000 Unit(s) SubCutaneous every 8 hours  hydrocortisone hemorrhoidal Suppository 1 Suppository(s) Rectal daily  insulin glargine Injectable (LANTUS) 10 Unit(s) SubCutaneous at bedtime  insulin lispro Injectable (HumaLOG) 5 Unit(s) SubCutaneous before breakfast  insulin lispro Injectable (HumaLOG) 5 Unit(s) SubCutaneous before lunch  insulin lispro Injectable (HumaLOG) 5 Unit(s) SubCutaneous before dinner  linezolid    Tablet 600 milliGRAM(s) Oral every 12 hours  methylPREDNISolone sodium succinate Injectable 60 milliGRAM(s) IV Push down to Q24h  metoprolol     tartrate 25 milliGRAM(s) Oral two times a day  montelukast 10 milliGRAM(s) Oral at bedtime  nystatin Powder 1 Application(s) Topical every 12 hours  pantoprazole    Tablet 40 milliGRAM(s) Oral before breakfast  senna 2 Tablet(s) Oral at bedtime  tacrolimus 0.5 milliGRAM(s) Oral at bedtime  tiotropium 18 MICROgram(s) Capsule 1 Capsule(s) Inhalation daily    MEDICATIONS  (PRN):  acetaminophen   Tablet. 650 milliGRAM(s) Oral every 6 hours PRN Moderate Pain (4 - 6)  dextrose Gel 1 Dose(s) Oral once PRN Blood Glucose LESS THAN 70 milliGRAM(s)/deciliter  glucagon  Injectable 1 milliGRAM(s) IntraMuscular once PRN Glucose LESS THAN 70 milligrams/deciliter  ondansetron Injectable 4 milliGRAM(s) IV Push every 8 hours PRN Nausea and/or Vomiting  polyethylene glycol 3350 17 Gram(s) Oral daily PRN Constipation    INTERVAL EVENTS: Patient seen this am, weak, more tired? does not have energy?  Vital Signs Last 24 Hrs  T(C): 36.2 (21 Mar 2018 14:15), Max: 36.4 (21 Mar 2018 06:00)  T(F): 97.2 (21 Mar 2018 14:15), Max: 97.5 (21 Mar 2018 06:00)  HR: 86 (21 Mar 2018 14:15) (73 - 86)  BP: 151/63 (21 Mar 2018 14:15) (148/68 - 166/73)  BP(mean): --  RR: 18 (21 Mar 2018 14:15) (16 - 18)  O2: 98% (03-19-18 @ 19:30) (98% - 98%)  T(C): 37.2 (20 Mar 2018 04:34), Max: 37.2 (20 Mar 2018 04:34)  T(F): 99 (20 Mar 2018 04:34), Max: 99 (20 Mar 2018 04:34)  HR: 80 (20 Mar 2018 06:32) (74 - 80)  BP: 157/75 (20 Mar 2018 06:32) (153/76 - 163/74)  BP(mean): --  RR: 18 (19 Mar 2018 21:30) (18 - 18)  SpO2: 98% (19 Mar 2018 19:30) (98% - 98%)    PHYSICAL EXAM:  GENERAL: NAD, gen weak  NECK: Supple, No JVD  CHEST/LUNG: Clear; No crackles or wheezing  HEART: S1, S2, Regular rate and rhythm;   ABDOMEN: Soft, Nontender, Nondistended; Bowel sounds present  EXTREMITIES: No clubbing, cyanosis, or edema; FROM, no deformity-L elbow  SKIN: No rashes ; multi ecchymoses-inc LUE, chest    LABS:   Labs:                        7.3    11.54 )-----------( 290      ( 20 Mar 2018 06:22 )             24.8             03-20    143  |  107  |  25<H>  ----------------------------<  138<H>  4.5   |  25  |  0.9    Ca    8.4<L>      20 Mar 2018 06:22  Phos  3.2     03-20  Mg     2.1     03-20                  RADIOLOGY & ADDITIONAL TESTS:  none Chart reviewed, patient examined. Pertinent results reviewed.  Consults noted  DONI PATRICK  73y  Female  HPI:  Pt states she fell Monday and went for evaluation to [facility] and laceration was sutured and pt was discharged. Pt came back today for increasing pain in right LE. Patient denies calf pain, fevers, chest pain, SOB, abdominal pain, nausea, vomiting, diarrhea, dizziness, weakness. (27 Feb 2018 13:32); course has been complicated by ARF, alveolar hemorrhage, PNA, hi dose steroids w myopathy. Pt/staff now report that she fell yesterday. "legs went out" while walking w RW. Superficial bruise to L elbow.    MEDICATIONS  (STANDING):  ALBUTerol    90 MICROgram(s) HFA Inhaler 1 Puff(s) Inhalation every 4 hours  ALBUTerol/ipratropium for Nebulization 3 milliLiter(s) Nebulizer every 6 hours  amLODIPine   Tablet 5 milliGRAM(s) Oral daily  BACItracin   Ointment 1 Application(s) Topical two times a day  dextrose 50% Injectable 12.5 Gram(s) IV Push once  dextrose 50% Injectable 25 Gram(s) IV Push once  dextrose 50% Injectable 25 Gram(s) IV Push once  docusate sodium 100 milliGRAM(s) Oral three times a day  heparin  Injectable 5000 Unit(s) SubCutaneous every 8 hours  hydrocortisone hemorrhoidal Suppository 1 Suppository(s) Rectal daily  insulin glargine Injectable (LANTUS) 10 Unit(s) SubCutaneous at bedtime  insulin lispro Injectable (HumaLOG) 5 Unit(s) SubCutaneous before breakfast  insulin lispro Injectable (HumaLOG) 5 Unit(s) SubCutaneous before lunch  insulin lispro Injectable (HumaLOG) 5 Unit(s) SubCutaneous before dinner  linezolid    Tablet 600 milliGRAM(s) Oral every 12 hours  methylPREDNISolone sodium succinate Injectable 60 milliGRAM(s) IV Push down to Q24h  metoprolol     tartrate 25 milliGRAM(s) Oral two times a day  montelukast 10 milliGRAM(s) Oral at bedtime  nystatin Powder 1 Application(s) Topical every 12 hours  pantoprazole    Tablet 40 milliGRAM(s) Oral before breakfast  senna 2 Tablet(s) Oral at bedtime  tacrolimus 0.5 milliGRAM(s) Oral at bedtime  tiotropium 18 MICROgram(s) Capsule 1 Capsule(s) Inhalation daily    MEDICATIONS  (PRN):  acetaminophen   Tablet. 650 milliGRAM(s) Oral every 6 hours PRN Moderate Pain (4 - 6)  dextrose Gel 1 Dose(s) Oral once PRN Blood Glucose LESS THAN 70 milliGRAM(s)/deciliter  glucagon  Injectable 1 milliGRAM(s) IntraMuscular once PRN Glucose LESS THAN 70 milligrams/deciliter  ondansetron Injectable 4 milliGRAM(s) IV Push every 8 hours PRN Nausea and/or Vomiting  polyethylene glycol 3350 17 Gram(s) Oral daily PRN Constipation    INTERVAL EVENTS: Patient seen this am, weak, more tired? does not have energy?  Vital Signs Last 24 Hrs  T(C): 36.2 (21 Mar 2018 14:15), Max: 36.4 (21 Mar 2018 06:00)  T(F): 97.2 (21 Mar 2018 14:15), Max: 97.5 (21 Mar 2018 06:00)  HR: 86 (21 Mar 2018 14:15) (73 - 86)  BP: 151/63 (21 Mar 2018 14:15) (148/68 - 166/73)  BP(mean): --  RR: 18 (21 Mar 2018 14:15) (16 - 18)  O2: 98% (03-19-18 @ 19:30) (98% - 98%)  T(C): 37.2 (20 Mar 2018 04:34), Max: 37.2 (20 Mar 2018 04:34)  T(F): 99 (20 Mar 2018 04:34), Max: 99 (20 Mar 2018 04:34)  HR: 80 (20 Mar 2018 06:32) (74 - 80)  BP: 157/75 (20 Mar 2018 06:32) (153/76 - 163/74)  BP(mean): --  RR: 18 (19 Mar 2018 21:30) (18 - 18)  SpO2: 98% (19 Mar 2018 19:30) (98% - 98%)    PHYSICAL EXAM:  GENERAL: NAD, gen weak  NECK: Supple, No JVD  CHEST/LUNG: Clear; No crackles or wheezing  HEART: S1, S2, Regular rate and rhythm;   ABDOMEN: Soft, Nontender, Nondistended; Bowel sounds present  EXTREMITIES: No clubbing, cyanosis, or edema; FROM, no deformity-L elbow  SKIN: No rashes ; multi ecchymoses-inc LUE, chest    LABS:   Labs:                        7.3    11.54 )-----------( 290      ( 20 Mar 2018 06:22 )             24.8             03-20    143  |  107  |  25<H>  ----------------------------<  138<H>  4.5   |  25  |  0.9    Ca    8.4<L>      20 Mar 2018 06:22  Phos  3.2     03-20  Mg     2.1     03-20                  RADIOLOGY & ADDITIONAL TESTS:  none

## 2018-03-21 NOTE — PROGRESS NOTE ADULT - ASSESSMENT
72 yo female with hx of HTN, autoimmune hepatitis on tacrolimus, h/o PCP pneumonia, asthma, prothrombin gene mutation/DVT in 3/2017, p/w persistent pain s/p surgical wound closure after sustaining mechanical fall at home at [facility], admitted for worsening wound infection who was initially admitted under medical service s/p debridement. Her hospital course was complicated by acute hypoxic RF requiring ICU admission; s/p BAL thought to be 2/2 ORSA/fungal PNA. She was downgraded to the medical floors 3/13 and her problems are being managed as follows --      1.Acute Respiratory Failure, secondary to  DAH and PNA (BAL on 3/9 revealing ORSA and yeast)   -remains on O2 therapy  -will taper steroids and start po, prednisone 60mg po q12  -continue respiratory treatments, Montelukast  -on Zyvox PO  -CTA on (3/20) negative for PE    2.Alveolar Hemorrhage   -resolved    3.LUE thrombophlebitis  - warm compresses     3.Hemorrhoids w/ mild bleeding  -Anusol ordered, feels better  -cbc stable, was transfused one unit yesterday  -GI consulted, Dr. Nicole, will follow up    4.Hypercoagulability 2/2 prothrombin mutation, h/o DVT and PE 3/2017  -Coumadin on hold 2/2 DAH    5..Right leg laceration s/p debridement 2/28  - local wound care per burn    6.HTN, BP elevated today  -continue Norvasc and Metoprolol  -ma need to adjust medication    7.AIH   -continue Tacrolimus  -on high dose steroid for pulm dx    #Diet: low sodium     #Activity: OOB as tolerated  #pt/rehab on board    #DVT/GI ppx:     #Disposition,  -will evaluate to see if SNF more appropriate given patient medical status.

## 2018-03-22 LAB
ANION GAP SERPL CALC-SCNC: 15 MMOL/L — HIGH (ref 7–14)
BASOPHILS # BLD AUTO: 0.01 K/UL — SIGNIFICANT CHANGE UP (ref 0–0.2)
BASOPHILS NFR BLD AUTO: 0.1 % — SIGNIFICANT CHANGE UP (ref 0–1)
BUN SERPL-MCNC: 25 MG/DL — HIGH (ref 10–20)
CALCIUM SERPL-MCNC: 8.5 MG/DL — SIGNIFICANT CHANGE UP (ref 8.5–10.1)
CHLORIDE SERPL-SCNC: 104 MMOL/L — SIGNIFICANT CHANGE UP (ref 98–110)
CO2 SERPL-SCNC: 23 MMOL/L — SIGNIFICANT CHANGE UP (ref 17–32)
CREAT SERPL-MCNC: 0.9 MG/DL — SIGNIFICANT CHANGE UP (ref 0.7–1.5)
EOSINOPHIL # BLD AUTO: 0 K/UL — SIGNIFICANT CHANGE UP (ref 0–0.7)
EOSINOPHIL NFR BLD AUTO: 0 % — SIGNIFICANT CHANGE UP (ref 0–8)
GLUCOSE SERPL-MCNC: 137 MG/DL — HIGH (ref 70–110)
HCT VFR BLD CALC: 30.1 % — LOW (ref 37–47)
HGB BLD-MCNC: 9.2 G/DL — LOW (ref 12–16)
IMM GRANULOCYTES NFR BLD AUTO: 1.5 % — HIGH (ref 0.1–0.3)
LYMPHOCYTES # BLD AUTO: 0.51 K/UL — LOW (ref 1.2–3.4)
LYMPHOCYTES # BLD AUTO: 3.6 % — LOW (ref 20.5–51.1)
MAGNESIUM SERPL-MCNC: 2.1 MG/DL — SIGNIFICANT CHANGE UP (ref 1.8–2.4)
MCHC RBC-ENTMCNC: 23.4 PG — LOW (ref 27–31)
MCHC RBC-ENTMCNC: 30.6 G/DL — LOW (ref 32–37)
MCV RBC AUTO: 76.6 FL — LOW (ref 81–99)
MONOCYTES # BLD AUTO: 0.29 K/UL — SIGNIFICANT CHANGE UP (ref 0.1–0.6)
MONOCYTES NFR BLD AUTO: 2.1 % — SIGNIFICANT CHANGE UP (ref 1.7–9.3)
NEUTROPHILS # BLD AUTO: 13.01 K/UL — HIGH (ref 1.4–6.5)
NEUTROPHILS NFR BLD AUTO: 92.7 % — HIGH (ref 42.2–75.2)
NRBC # BLD: 0 /100 WBCS — SIGNIFICANT CHANGE UP (ref 0–0)
PHOSPHATE SERPL-MCNC: 3 MG/DL — SIGNIFICANT CHANGE UP (ref 2.1–4.9)
PLATELET # BLD AUTO: 210 K/UL — SIGNIFICANT CHANGE UP (ref 130–400)
POTASSIUM SERPL-MCNC: 4.5 MMOL/L — SIGNIFICANT CHANGE UP (ref 3.5–5)
POTASSIUM SERPL-SCNC: 4.5 MMOL/L — SIGNIFICANT CHANGE UP (ref 3.5–5)
RBC # BLD: 3.93 M/UL — LOW (ref 4.2–5.4)
RBC # FLD: 19.6 % — HIGH (ref 11.5–14.5)
SODIUM SERPL-SCNC: 142 MMOL/L — SIGNIFICANT CHANGE UP (ref 135–146)
WBC # BLD: 14.03 K/UL — HIGH (ref 4.8–10.8)
WBC # FLD AUTO: 14.03 K/UL — HIGH (ref 4.8–10.8)

## 2018-03-22 RX ADMIN — HEPARIN SODIUM 5000 UNIT(S): 5000 INJECTION INTRAVENOUS; SUBCUTANEOUS at 21:43

## 2018-03-22 RX ADMIN — TACROLIMUS 0.5 MILLIGRAM(S): 5 CAPSULE ORAL at 21:43

## 2018-03-22 RX ADMIN — NYSTATIN CREAM 1 APPLICATION(S): 100000 CREAM TOPICAL at 17:43

## 2018-03-22 RX ADMIN — Medication 3 MILLILITER(S): at 08:03

## 2018-03-22 RX ADMIN — Medication 25 MILLIGRAM(S): at 17:43

## 2018-03-22 RX ADMIN — PANTOPRAZOLE SODIUM 40 MILLIGRAM(S): 20 TABLET, DELAYED RELEASE ORAL at 05:14

## 2018-03-22 RX ADMIN — Medication 60 MILLIGRAM(S): at 05:15

## 2018-03-22 RX ADMIN — Medication 1 APPLICATION(S): at 05:16

## 2018-03-22 RX ADMIN — SENNA PLUS 2 TABLET(S): 8.6 TABLET ORAL at 21:43

## 2018-03-22 RX ADMIN — Medication 100 MILLIGRAM(S): at 21:43

## 2018-03-22 RX ADMIN — MONTELUKAST 10 MILLIGRAM(S): 4 TABLET, CHEWABLE ORAL at 21:43

## 2018-03-22 RX ADMIN — AMLODIPINE BESYLATE 5 MILLIGRAM(S): 2.5 TABLET ORAL at 05:14

## 2018-03-22 RX ADMIN — Medication 60 MILLIGRAM(S): at 11:13

## 2018-03-22 RX ADMIN — LINEZOLID 600 MILLIGRAM(S): 600 INJECTION, SOLUTION INTRAVENOUS at 05:15

## 2018-03-22 RX ADMIN — NYSTATIN CREAM 1 APPLICATION(S): 100000 CREAM TOPICAL at 05:16

## 2018-03-22 RX ADMIN — Medication 1 APPLICATION(S): at 18:09

## 2018-03-22 RX ADMIN — Medication 5 UNIT(S): at 11:13

## 2018-03-22 RX ADMIN — LINEZOLID 600 MILLIGRAM(S): 600 INJECTION, SOLUTION INTRAVENOUS at 17:43

## 2018-03-22 RX ADMIN — INSULIN GLARGINE 10 UNIT(S): 100 INJECTION, SOLUTION SUBCUTANEOUS at 21:43

## 2018-03-22 RX ADMIN — Medication 5 UNIT(S): at 08:46

## 2018-03-22 RX ADMIN — Medication 25 MILLIGRAM(S): at 05:14

## 2018-03-22 RX ADMIN — Medication 5 UNIT(S): at 17:47

## 2018-03-22 NOTE — PROGRESS NOTE ADULT - ASSESSMENT
IMPRESSION:    AHRF resolved  DAH resolved  MRSA pneumonia treated  HO autoimmune hepatitis        PLAN:    CNS: no depressants    HEENT: Oral care    PULMONARY:  HOB @ 45 degrees.  Wean O2. PO prednisone 60 mg  once daily     CARDIOVASCULAR:  I < O,     GI: GI prophylaxis.  Feeding      RENAL:  Follow up lytes.  Correct as needed    INFECTIOUS DISEASE:     HEMATOLOGICAL:  DVT prophylaxis.    ENDOCRINE:  Follow up FS.      MUSCULOSKELETAL: Physical therapy    OOB to chair IMPRESSION:    AHRF resolved  DAH resolved  MRSA pneumonia treated  HO autoimmune hepatitis        PLAN:    CNS: no depressants    HEENT: Oral care    PULMONARY:  HOB @ 45 degrees.  Wean O2. PO prednisone 60 mg  once daily for 1 week then  40 mg daily    CARDIOVASCULAR:  I < O,     GI: GI prophylaxis.  Feeding      RENAL:  Follow up lytes.  Correct as needed    INFECTIOUS DISEASE:     HEMATOLOGICAL:  DVT prophylaxis.    ENDOCRINE:  Follow up FS.      MUSCULOSKELETAL: Physical therapy    OOB to chair     Will likely be able to start AC by next week

## 2018-03-22 NOTE — PROGRESS NOTE ADULT - ASSESSMENT
74 yo female with hx of HTN, autoimmune hepatitis on tacrolimus, h/o PCP pneumonia, asthma, prothrombin gene mutation/DVT in 3/2017, p/w persistent pain s/p surgical wound closure after sustaining mechanical fall at home at [facility], admitted for worsening wound infection who was initially admitted under medical service s/p debridement. Her hospital course was complicated by acute hypoxic RF requiring ICU admission; s/p BAL thought to be 2/2 ORSA/fungal PNA. She was downgraded to the medical floors 3/13 and her problems are being managed as follows --      #Acute Respiratory Failure, secondary to  DAH and PNA (BAL on 3/9 revealing ORSA and yeast)   - remains on O2 therapy  - wean steroids as per pulmonary  - continue respiratory treatments, Montelukast    #LUE thrombophlebitis  - resolved    #Hemorrhoids   - responding to treatment, no further bleeding noted  - monitor CBC , currently stable    #Hypercoagulability 2/2 prothrombin mutation, h/o DVT and PE 3/2017  - Coumadin on hold 2/2 DAH  - consider resuming rx next week    #Right leg laceration s/p debridement 2/28  - local wound care per burn    #HTN  - continue Norvasc and Metoprolol    #AIH   - GI following  - continue Tacrolimus  - on steroid for pulm dx    #Diet: low sodium     #Activity: OOB as tolerated  - Rehab evaluation - bedside therapy    #DVT/GI ppx: SEE (cleared by pulm since pt high risk)    #Disposition, D/c planning  - await rehab recommendations, patient decision    Case discussed with housestaff

## 2018-03-22 NOTE — PROGRESS NOTE ADULT - SUBJECTIVE AND OBJECTIVE BOX
LENGTH OF HOSPITAL STAY: 23d    CHIEF COMPLAINT:   Patient is a 73y old  Female who presents with a chief complaint of right leg wound, needs debridement (04 Mar 2018 15:38)      Overnight events:    No acute events overnight    ALLERGIES:  No Known Allergies    MEDICATIONS:  STANDING MEDICATIONS  ALBUTerol    90 MICROgram(s) HFA Inhaler 1 Puff(s) Inhalation every 4 hours  ALBUTerol/ipratropium for Nebulization 3 milliLiter(s) Nebulizer every 6 hours  amLODIPine   Tablet 5 milliGRAM(s) Oral daily  BACItracin   Ointment 1 Application(s) Topical two times a day  dextrose 50% Injectable 12.5 Gram(s) IV Push once  dextrose 50% Injectable 25 Gram(s) IV Push once  dextrose 50% Injectable 25 Gram(s) IV Push once  docusate sodium 100 milliGRAM(s) Oral three times a day  heparin  Injectable 5000 Unit(s) SubCutaneous every 8 hours  hydrocortisone hemorrhoidal Suppository 1 Suppository(s) Rectal daily  insulin glargine Injectable (LANTUS) 10 Unit(s) SubCutaneous at bedtime  insulin lispro Injectable (HumaLOG) 5 Unit(s) SubCutaneous before breakfast  insulin lispro Injectable (HumaLOG) 5 Unit(s) SubCutaneous before lunch  insulin lispro Injectable (HumaLOG) 5 Unit(s) SubCutaneous before dinner  linezolid    Tablet 600 milliGRAM(s) Oral every 12 hours  metoprolol     tartrate 25 milliGRAM(s) Oral two times a day  montelukast 10 milliGRAM(s) Oral at bedtime  nystatin Powder 1 Application(s) Topical every 12 hours  pantoprazole    Tablet 40 milliGRAM(s) Oral before breakfast  predniSONE   Tablet 60 milliGRAM(s) Oral daily  senna 2 Tablet(s) Oral at bedtime  tacrolimus 0.5 milliGRAM(s) Oral at bedtime  tiotropium 18 MICROgram(s) Capsule 1 Capsule(s) Inhalation daily    PRN MEDICATIONS  acetaminophen   Tablet. 650 milliGRAM(s) Oral every 6 hours PRN  dextrose Gel 1 Dose(s) Oral once PRN  glucagon  Injectable 1 milliGRAM(s) IntraMuscular once PRN  ondansetron Injectable 4 milliGRAM(s) IV Push every 8 hours PRN  polyethylene glycol 3350 17 Gram(s) Oral daily PRN    VITALS:   T(F): 96.6  HR: 67  BP: 163/81  RR: 18  SpO2: 97%    LABS:                        9.2    14.03 )-----------( 210      ( 22 Mar 2018 08:23 )             30.1     03-22    142  |  104  |  25<H>  ----------------------------<  137<H>  4.5   |  23  |  0.9    Ca    8.5      22 Mar 2018 08:23  Phos  3.0     03-22  Mg     2.1     03-22    Cultures:    RADIOLOGY:    PHYSICAL EXAM:  GEN: No acute distress  LUNGS: Clear to auscultation bilaterally   HEART: S1/S2 present. RRR.   ABD: Soft, non-tender, non-distended. Bowel sounds present  EXT: no cyanosis, no pitting edema  NEURO: AAOX3

## 2018-03-22 NOTE — PROGRESS NOTE ADULT - SUBJECTIVE AND OBJECTIVE BOX
SUBJECTIVE: Patinet examined at bedside. No new complaints.      REVIEW OF SYSTEMS:  See HPI    PHYSICAL EXAM  Vital Signs Last 24 Hrs  T(C): 35.9 (22 Mar 2018 04:44), Max: 36.2 (21 Mar 2018 14:15)  T(F): 96.6 (22 Mar 2018 04:44), Max: 97.2 (21 Mar 2018 14:15)  HR: 67 (22 Mar 2018 04:44) (67 - 86)  BP: 163/81 (22 Mar 2018 04:44) (151/63 - 163/81)  BP(mean): --  RR: 18 (22 Mar 2018 04:44) (18 - 18)  SpO2: 97% (22 Mar 2018 08:29) (95% - 97%)    General: AAO x 3  HEENT: NAD          Lymph Nodes: NO lymphadenopathy  Neck:  Supple  Lungs: bilateral crackles+  Cardiovascular: S1S2  Abdomen: Soft, non tender  Extremities: NO edema or cyanosis  Skin: Intact  neuro: non focal    LABS:                          8.5    10.87 )-----------( 198      ( 21 Mar 2018 22:57 )             27.7                                               03-21    141  |  105  |  24<H>  ----------------------------<  109  4.2   |  23  |  0.8    Ca    8.5      21 Mar 2018 07:52  Phos  3.2     03-21  Mg     2.0     03-21    MEDICATIONS  (STANDING):  ALBUTerol    90 MICROgram(s) HFA Inhaler 1 Puff(s) Inhalation every 4 hours  ALBUTerol/ipratropium for Nebulization 3 milliLiter(s) Nebulizer every 6 hours  amLODIPine   Tablet 5 milliGRAM(s) Oral daily  BACItracin   Ointment 1 Application(s) Topical two times a day  dextrose 50% Injectable 12.5 Gram(s) IV Push once  dextrose 50% Injectable 25 Gram(s) IV Push once  dextrose 50% Injectable 25 Gram(s) IV Push once  docusate sodium 100 milliGRAM(s) Oral three times a day  heparin  Injectable 5000 Unit(s) SubCutaneous every 8 hours  hydrocortisone hemorrhoidal Suppository 1 Suppository(s) Rectal daily  insulin glargine Injectable (LANTUS) 10 Unit(s) SubCutaneous at bedtime  insulin lispro Injectable (HumaLOG) 5 Unit(s) SubCutaneous before breakfast  insulin lispro Injectable (HumaLOG) 5 Unit(s) SubCutaneous before lunch  insulin lispro Injectable (HumaLOG) 5 Unit(s) SubCutaneous before dinner  linezolid    Tablet 600 milliGRAM(s) Oral every 12 hours  methylPREDNISolone sodium succinate Injectable 60 milliGRAM(s) IV Push daily  metoprolol     tartrate 25 milliGRAM(s) Oral two times a day  montelukast 10 milliGRAM(s) Oral at bedtime  nystatin Powder 1 Application(s) Topical every 12 hours  pantoprazole    Tablet 40 milliGRAM(s) Oral before breakfast  senna 2 Tablet(s) Oral at bedtime  tacrolimus 0.5 milliGRAM(s) Oral at bedtime  tiotropium 18 MICROgram(s) Capsule 1 Capsule(s) Inhalation daily    MEDICATIONS  (PRN):  acetaminophen   Tablet. 650 milliGRAM(s) Oral every 6 hours PRN Moderate Pain (4 - 6)  dextrose Gel 1 Dose(s) Oral once PRN Blood Glucose LESS THAN 70 milliGRAM(s)/deciliter  glucagon  Injectable 1 milliGRAM(s) IntraMuscular once PRN Glucose LESS THAN 70 milligrams/deciliter  ondansetron Injectable 4 milliGRAM(s) IV Push every 8 hours PRN Nausea and/or Vomiting  polyethylene glycol 3350 17 Gram(s) Oral daily PRN Constipation

## 2018-03-22 NOTE — PROGRESS NOTE ADULT - SUBJECTIVE AND OBJECTIVE BOX
DONI PATRICK  73y  Female  HPI:  Pt states she fell Monday and went for evaluation to [facility] and laceration was sutured and pt was discharged. Pt came back today for increasing pain in right LE. Patient denies calf pain, fevers, chest pain, SOB, abdominal pain, nausea, vomiting, diarrhea, dizziness, weakness. (27 Feb 2018 13:32)    MEDICATIONS  (STANDING):  ALBUTerol    90 MICROgram(s) HFA Inhaler 1 Puff(s) Inhalation every 4 hours  ALBUTerol/ipratropium for Nebulization 3 milliLiter(s) Nebulizer every 6 hours  amLODIPine   Tablet 5 milliGRAM(s) Oral daily  BACItracin   Ointment 1 Application(s) Topical two times a day  dextrose 50% Injectable 12.5 Gram(s) IV Push once  dextrose 50% Injectable 25 Gram(s) IV Push once  dextrose 50% Injectable 25 Gram(s) IV Push once  docusate sodium 100 milliGRAM(s) Oral three times a day  heparin  Injectable 5000 Unit(s) SubCutaneous every 8 hours  hydrocortisone hemorrhoidal Suppository 1 Suppository(s) Rectal daily  insulin glargine Injectable (LANTUS) 10 Unit(s) SubCutaneous at bedtime  insulin lispro Injectable (HumaLOG) 5 Unit(s) SubCutaneous before breakfast  insulin lispro Injectable (HumaLOG) 5 Unit(s) SubCutaneous before lunch  insulin lispro Injectable (HumaLOG) 5 Unit(s) SubCutaneous before dinner  linezolid    Tablet 600 milliGRAM(s) Oral every 12 hours  metoprolol     tartrate 25 milliGRAM(s) Oral two times a day  montelukast 10 milliGRAM(s) Oral at bedtime  nystatin Powder 1 Application(s) Topical every 12 hours  pantoprazole    Tablet 40 milliGRAM(s) Oral before breakfast  predniSONE   Tablet 60 milliGRAM(s) Oral daily  senna 2 Tablet(s) Oral at bedtime  tacrolimus 0.5 milliGRAM(s) Oral at bedtime  tiotropium 18 MICROgram(s) Capsule 1 Capsule(s) Inhalation daily    MEDICATIONS  (PRN):  acetaminophen   Tablet. 650 milliGRAM(s) Oral every 6 hours PRN Moderate Pain (4 - 6)  dextrose Gel 1 Dose(s) Oral once PRN Blood Glucose LESS THAN 70 milliGRAM(s)/deciliter  glucagon  Injectable 1 milliGRAM(s) IntraMuscular once PRN Glucose LESS THAN 70 milligrams/deciliter  ondansetron Injectable 4 milliGRAM(s) IV Push every 8 hours PRN Nausea and/or Vomiting  polyethylene glycol 3350 17 Gram(s) Oral daily PRN Constipation    INTERVAL EVENTS: Patient seen today OOB in chair without distress. Patient still has blood tinged sputum. Patient remains off Coumadin.    T(C): 36.2 (03-22-18 @ 13:39), Max: 36.2 (03-22-18 @ 13:39)  HR: 78 (03-22-18 @ 13:39) (67 - 78)  BP: 142/71 (03-22-18 @ 13:39) (142/71 - 163/81)  RR: 18 (03-22-18 @ 13:39) (18 - 18)  SpO2: 90% (03-22-18 @ 14:55) (90% - 97%)  Wt(kg): --Vital Signs Last 24 Hrs  T(C): 36.2 (22 Mar 2018 13:39), Max: 36.2 (22 Mar 2018 13:39)  T(F): 97.1 (22 Mar 2018 13:39), Max: 97.1 (22 Mar 2018 13:39)  HR: 78 (22 Mar 2018 13:39) (67 - 78)  BP: 142/71 (22 Mar 2018 13:39) (142/71 - 163/81)  BP(mean): --  RR: 18 (22 Mar 2018 13:39) (18 - 18)  SpO2: 90% (22 Mar 2018 14:55) (90% - 97%)    PHYSICAL EXAM:  GENERAL: NAD  NECK: Supple, No JVD  CHEST/LUNG: few crackles or wheezing  HEART: S1, S2, Regular rate and rhythm;   ABDOMEN: Soft, Nontender, Nondistended; BS+  EXTREMITIES: trace edema, dressing in place      LABS:  Labs:                        9.2    14.03 )-----------( 210      ( 22 Mar 2018 08:23 )             30.1             03-22    142  |  104  |  25<H>  ----------------------------<  137<H>  4.5   |  23  |  0.9    Ca    8.5      22 Mar 2018 08:23  Phos  3.0     03-22  Mg     2.1     03-22      RADIOLOGY & ADDITIONAL TESTS:  < from: CT Chest w/ IV Cont (03.20.18 @ 23:55) >  IMPRESSION:    No central pulmonary embolus.    Bilateral airspace opacities right greater than left and bilateral small   pleural effusions have improved since March 4, 2018. Follow-up to   resolution is recommended.    < end of copied text >

## 2018-03-22 NOTE — PROGRESS NOTE ADULT - ASSESSMENT
74 yo female with hx of HTN, autoimmune hepatitis on tacrolimus, h/o PCP pneumonia, asthma, prothrombin gene mutation/DVT in 3/2017, p/w persistent pain s/p surgical wound closure after sustaining mechanical fall at home at [facility], admitted for worsening wound infection who was initially admitted under medical service s/p debridement. Her hospital course was complicated by acute hypoxic RF requiring ICU admission; s/p BAL thought to be 2/2 ORSA/fungal PNA. She was downgraded to the medical floors 3/13 and her problems are being managed as follows --      1.Acute Respiratory Failure, secondary to  DAH and PNA (BAL on 3/9 revealing ORSA and yeast)   -remains on O2 therapy  -prednisone 60mg po q12  -continue respiratory treatments, Montelukast  -on Zyvox PO  -CTA on (3/20) negative for PE    2.Alveolar Hemorrhage   -resolved    3.LUE thrombophlebitis  - warm compresses     3.Hemorrhoids w/ mild bleeding  -as per GI outpatient colonoscopy will be planned upon discharge    4.Hypercoagulability 2/2 prothrombin mutation, h/o DVT and PE 3/2017  -Coumadin on hold 2/2 DAH    5..Right leg laceration s/p debridement 2/28  -local wound care per burn    6.HTN, BP elevated today  -continue Norvasc and Metoprolol    7.AIH   -continue Tacrolimus  -on high dose steroid for pulm dx    #Diet: low sodium     #Activity: OOB as tolerated  #pt/rehab on board    #DVT/GI ppx:     #Disposition,  -patient wants to go home with home service eventually.

## 2018-03-22 NOTE — PROGRESS NOTE ADULT - NSHPATTENDINGPLANDISCUSS_GEN_ALL_CORE
HOuse staff
ICU staff
ICU staff
Pt, RN, HO, GI
pt, HO
House staff
House staff
pt,BONNIE, RN

## 2018-03-23 ENCOUNTER — INPATIENT (INPATIENT)
Facility: HOSPITAL | Age: 73
LOS: 17 days | Discharge: HOME HEALTH | End: 2018-04-10
Attending: PHYSICAL MEDICINE & REHABILITATION

## 2018-03-23 VITALS
SYSTOLIC BLOOD PRESSURE: 145 MMHG | DIASTOLIC BLOOD PRESSURE: 62 MMHG | HEART RATE: 73 BPM | TEMPERATURE: 97 F | RESPIRATION RATE: 18 BRPM

## 2018-03-23 LAB
ANION GAP SERPL CALC-SCNC: 14 MMOL/L — SIGNIFICANT CHANGE UP (ref 7–14)
BASOPHILS # BLD AUTO: 0.02 K/UL — SIGNIFICANT CHANGE UP (ref 0–0.2)
BASOPHILS NFR BLD AUTO: 0.1 % — SIGNIFICANT CHANGE UP (ref 0–1)
BUN SERPL-MCNC: 26 MG/DL — HIGH (ref 10–20)
CALCIUM SERPL-MCNC: 8.4 MG/DL — LOW (ref 8.5–10.1)
CHLORIDE SERPL-SCNC: 107 MMOL/L — SIGNIFICANT CHANGE UP (ref 98–110)
CO2 SERPL-SCNC: 24 MMOL/L — SIGNIFICANT CHANGE UP (ref 17–32)
CREAT SERPL-MCNC: 0.9 MG/DL — SIGNIFICANT CHANGE UP (ref 0.7–1.5)
EOSINOPHIL # BLD AUTO: 0.01 K/UL — SIGNIFICANT CHANGE UP (ref 0–0.7)
EOSINOPHIL NFR BLD AUTO: 0.1 % — SIGNIFICANT CHANGE UP (ref 0–8)
GLUCOSE SERPL-MCNC: 121 MG/DL — HIGH (ref 70–110)
HCT VFR BLD CALC: 28.9 % — LOW (ref 37–47)
HGB BLD-MCNC: 8.7 G/DL — LOW (ref 12–16)
IMM GRANULOCYTES NFR BLD AUTO: 1.1 % — HIGH (ref 0.1–0.3)
LYMPHOCYTES # BLD AUTO: 0.71 K/UL — LOW (ref 1.2–3.4)
LYMPHOCYTES # BLD AUTO: 4.5 % — LOW (ref 20.5–51.1)
MAGNESIUM SERPL-MCNC: 2.1 MG/DL — SIGNIFICANT CHANGE UP (ref 1.8–2.4)
MCHC RBC-ENTMCNC: 23.5 PG — LOW (ref 27–31)
MCHC RBC-ENTMCNC: 30.1 G/DL — LOW (ref 32–37)
MCV RBC AUTO: 77.9 FL — LOW (ref 81–99)
MONOCYTES # BLD AUTO: 0.33 K/UL — SIGNIFICANT CHANGE UP (ref 0.1–0.6)
MONOCYTES NFR BLD AUTO: 2.1 % — SIGNIFICANT CHANGE UP (ref 1.7–9.3)
NEUTROPHILS # BLD AUTO: 14.44 K/UL — HIGH (ref 1.4–6.5)
NEUTROPHILS NFR BLD AUTO: 92.1 % — HIGH (ref 42.2–75.2)
NRBC # BLD: 0 /100 WBCS — SIGNIFICANT CHANGE UP (ref 0–0)
PHOSPHATE SERPL-MCNC: 3.1 MG/DL — SIGNIFICANT CHANGE UP (ref 2.1–4.9)
PLATELET # BLD AUTO: 214 K/UL — SIGNIFICANT CHANGE UP (ref 130–400)
POTASSIUM SERPL-MCNC: 4.7 MMOL/L — SIGNIFICANT CHANGE UP (ref 3.5–5)
POTASSIUM SERPL-SCNC: 4.7 MMOL/L — SIGNIFICANT CHANGE UP (ref 3.5–5)
RBC # BLD: 3.71 M/UL — LOW (ref 4.2–5.4)
RBC # FLD: 20.1 % — HIGH (ref 11.5–14.5)
SODIUM SERPL-SCNC: 145 MMOL/L — SIGNIFICANT CHANGE UP (ref 135–146)
WBC # BLD: 15.68 K/UL — HIGH (ref 4.8–10.8)
WBC # FLD AUTO: 15.68 K/UL — HIGH (ref 4.8–10.8)

## 2018-03-23 RX ORDER — NYSTATIN CREAM 100000 [USP'U]/G
1 CREAM TOPICAL
Qty: 0 | Refills: 0 | COMMUNITY
Start: 2018-03-23

## 2018-03-23 RX ORDER — DOCUSATE SODIUM 100 MG
100 CAPSULE ORAL THREE TIMES A DAY
Qty: 0 | Refills: 0 | Status: DISCONTINUED | OUTPATIENT
Start: 2018-03-23 | End: 2018-04-06

## 2018-03-23 RX ORDER — AMLODIPINE BESYLATE 2.5 MG/1
5 TABLET ORAL EVERY 24 HOURS
Qty: 0 | Refills: 0 | Status: DISCONTINUED | OUTPATIENT
Start: 2018-03-23 | End: 2018-04-10

## 2018-03-23 RX ORDER — IPRATROPIUM/ALBUTEROL SULFATE 18-103MCG
3 AEROSOL WITH ADAPTER (GRAM) INHALATION EVERY 6 HOURS
Qty: 0 | Refills: 0 | Status: DISCONTINUED | OUTPATIENT
Start: 2018-03-23 | End: 2018-04-10

## 2018-03-23 RX ORDER — SENNA PLUS 8.6 MG/1
2 TABLET ORAL
Qty: 0 | Refills: 0 | COMMUNITY
Start: 2018-03-23

## 2018-03-23 RX ORDER — PANTOPRAZOLE SODIUM 20 MG/1
1 TABLET, DELAYED RELEASE ORAL
Qty: 0 | Refills: 0 | COMMUNITY
Start: 2018-03-23

## 2018-03-23 RX ORDER — INSULIN GLARGINE 100 [IU]/ML
10 INJECTION, SOLUTION SUBCUTANEOUS AT BEDTIME
Qty: 0 | Refills: 0 | Status: DISCONTINUED | OUTPATIENT
Start: 2018-03-23 | End: 2018-04-06

## 2018-03-23 RX ORDER — INSULIN LISPRO 100/ML
3 VIAL (ML) SUBCUTANEOUS
Qty: 0 | Refills: 0 | COMMUNITY
Start: 2018-03-23

## 2018-03-23 RX ORDER — HYDROCORTISONE 1 %
1 OINTMENT (GRAM) TOPICAL EVERY 24 HOURS
Qty: 0 | Refills: 0 | Status: DISCONTINUED | OUTPATIENT
Start: 2018-03-23 | End: 2018-04-10

## 2018-03-23 RX ORDER — METOPROLOL TARTRATE 50 MG
1 TABLET ORAL
Qty: 0 | Refills: 0 | COMMUNITY

## 2018-03-23 RX ORDER — INSULIN GLARGINE 100 [IU]/ML
10 INJECTION, SOLUTION SUBCUTANEOUS
Qty: 0 | Refills: 0 | COMMUNITY
Start: 2018-03-23

## 2018-03-23 RX ORDER — METOPROLOL TARTRATE 50 MG
25 TABLET ORAL
Qty: 0 | Refills: 0 | Status: DISCONTINUED | OUTPATIENT
Start: 2018-03-23 | End: 2018-04-10

## 2018-03-23 RX ORDER — AMLODIPINE BESYLATE 2.5 MG/1
1 TABLET ORAL
Qty: 0 | Refills: 0 | DISCHARGE
Start: 2018-03-23

## 2018-03-23 RX ORDER — SENNA PLUS 8.6 MG/1
2 TABLET ORAL AT BEDTIME
Qty: 0 | Refills: 0 | Status: DISCONTINUED | OUTPATIENT
Start: 2018-03-23 | End: 2018-04-06

## 2018-03-23 RX ORDER — HEPARIN SODIUM 5000 [USP'U]/ML
5000 INJECTION INTRAVENOUS; SUBCUTANEOUS EVERY 8 HOURS
Qty: 0 | Refills: 0 | Status: DISCONTINUED | OUTPATIENT
Start: 2018-03-23 | End: 2018-03-26

## 2018-03-23 RX ORDER — ONDANSETRON 8 MG/1
4 TABLET, FILM COATED ORAL ONCE
Qty: 0 | Refills: 0 | Status: DISCONTINUED | OUTPATIENT
Start: 2018-03-23 | End: 2018-04-10

## 2018-03-23 RX ORDER — INSULIN GLARGINE 100 [IU]/ML
9 INJECTION, SOLUTION SUBCUTANEOUS
Qty: 0 | Refills: 0 | COMMUNITY
Start: 2018-03-23

## 2018-03-23 RX ORDER — BACITRACIN ZINC 500 UNIT/G
1 OINTMENT IN PACKET (EA) TOPICAL EVERY 12 HOURS
Qty: 0 | Refills: 0 | Status: DISCONTINUED | OUTPATIENT
Start: 2018-03-23 | End: 2018-03-28

## 2018-03-23 RX ORDER — BACITRACIN ZINC 500 UNIT/G
1 OINTMENT IN PACKET (EA) TOPICAL
Qty: 0 | Refills: 0 | COMMUNITY
Start: 2018-03-23

## 2018-03-23 RX ORDER — HYDROCORTISONE 1 %
1 OINTMENT (GRAM) TOPICAL
Qty: 0 | Refills: 0 | COMMUNITY
Start: 2018-03-23

## 2018-03-23 RX ORDER — MONTELUKAST 4 MG/1
1 TABLET, CHEWABLE ORAL
Qty: 0 | Refills: 0 | COMMUNITY

## 2018-03-23 RX ORDER — TACROLIMUS 5 MG/1
1 CAPSULE ORAL
Qty: 0 | Refills: 0 | COMMUNITY
Start: 2018-03-23

## 2018-03-23 RX ORDER — AMLODIPINE BESYLATE 2.5 MG/1
1 TABLET ORAL
Qty: 0 | Refills: 0 | COMMUNITY

## 2018-03-23 RX ORDER — ONDANSETRON 8 MG/1
4 TABLET, FILM COATED ORAL EVERY 8 HOURS
Qty: 0 | Refills: 0 | Status: DISCONTINUED | OUTPATIENT
Start: 2018-03-23 | End: 2018-04-10

## 2018-03-23 RX ORDER — MONTELUKAST 4 MG/1
10 TABLET, CHEWABLE ORAL AT BEDTIME
Qty: 0 | Refills: 0 | Status: DISCONTINUED | OUTPATIENT
Start: 2018-03-23 | End: 2018-04-10

## 2018-03-23 RX ORDER — ALBUTEROL 90 UG/1
1 AEROSOL, METERED ORAL
Qty: 0 | Refills: 0 | COMMUNITY
Start: 2018-03-23

## 2018-03-23 RX ORDER — TACROLIMUS 5 MG/1
1 CAPSULE ORAL
Qty: 0 | Refills: 0 | COMMUNITY

## 2018-03-23 RX ORDER — TACROLIMUS 5 MG/1
0.5 CAPSULE ORAL AT BEDTIME
Qty: 0 | Refills: 0 | Status: DISCONTINUED | OUTPATIENT
Start: 2018-03-23 | End: 2018-04-10

## 2018-03-23 RX ORDER — ACETAMINOPHEN 500 MG
650 TABLET ORAL EVERY 6 HOURS
Qty: 0 | Refills: 0 | Status: DISCONTINUED | OUTPATIENT
Start: 2018-03-23 | End: 2018-04-10

## 2018-03-23 RX ORDER — INSULIN LISPRO 100/ML
5 VIAL (ML) SUBCUTANEOUS
Qty: 0 | Refills: 0 | COMMUNITY
Start: 2018-03-23

## 2018-03-23 RX ORDER — METOPROLOL TARTRATE 50 MG
1 TABLET ORAL
Qty: 0 | Refills: 0 | DISCHARGE
Start: 2018-03-23

## 2018-03-23 RX ORDER — HEPARIN SODIUM 5000 [USP'U]/ML
5000 INJECTION INTRAVENOUS; SUBCUTANEOUS
Qty: 0 | Refills: 0 | COMMUNITY
Start: 2018-03-23

## 2018-03-23 RX ORDER — DOCUSATE SODIUM 100 MG
1 CAPSULE ORAL
Qty: 0 | Refills: 0 | COMMUNITY
Start: 2018-03-23

## 2018-03-23 RX ORDER — PANTOPRAZOLE SODIUM 20 MG/1
40 TABLET, DELAYED RELEASE ORAL
Qty: 0 | Refills: 0 | Status: DISCONTINUED | OUTPATIENT
Start: 2018-03-23 | End: 2018-04-10

## 2018-03-23 RX ORDER — TACROLIMUS 5 MG/1
1 CAPSULE ORAL
Qty: 0 | Refills: 0 | DISCHARGE
Start: 2018-03-23

## 2018-03-23 RX ORDER — TIOTROPIUM BROMIDE 18 UG/1
1 CAPSULE ORAL; RESPIRATORY (INHALATION)
Qty: 0 | Refills: 0 | COMMUNITY
Start: 2018-03-23

## 2018-03-23 RX ORDER — LINEZOLID 600 MG/300ML
600 INJECTION, SOLUTION INTRAVENOUS EVERY 12 HOURS
Qty: 0 | Refills: 0 | Status: DISCONTINUED | OUTPATIENT
Start: 2018-03-23 | End: 2018-03-26

## 2018-03-23 RX ORDER — LINEZOLID 600 MG/300ML
1 INJECTION, SOLUTION INTRAVENOUS
Qty: 0 | Refills: 0 | COMMUNITY
Start: 2018-03-23

## 2018-03-23 RX ORDER — NYSTATIN CREAM 100000 [USP'U]/G
1 CREAM TOPICAL EVERY 12 HOURS
Qty: 0 | Refills: 0 | Status: DISCONTINUED | OUTPATIENT
Start: 2018-03-23 | End: 2018-04-10

## 2018-03-23 RX ORDER — INSULIN LISPRO 100/ML
5 VIAL (ML) SUBCUTANEOUS
Qty: 0 | Refills: 0 | Status: DISCONTINUED | OUTPATIENT
Start: 2018-03-23 | End: 2018-04-06

## 2018-03-23 RX ORDER — POLYETHYLENE GLYCOL 3350 17 G/17G
17 POWDER, FOR SOLUTION ORAL EVERY 24 HOURS
Qty: 0 | Refills: 0 | Status: DISCONTINUED | OUTPATIENT
Start: 2018-03-23 | End: 2018-04-10

## 2018-03-23 RX ORDER — MONTELUKAST 4 MG/1
1 TABLET, CHEWABLE ORAL
Qty: 0 | Refills: 0 | COMMUNITY
Start: 2018-03-23

## 2018-03-23 RX ADMIN — Medication 5 UNIT(S): at 17:34

## 2018-03-23 RX ADMIN — NYSTATIN CREAM 1 APPLICATION(S): 100000 CREAM TOPICAL at 17:33

## 2018-03-23 RX ADMIN — NYSTATIN CREAM 1 APPLICATION(S): 100000 CREAM TOPICAL at 06:02

## 2018-03-23 RX ADMIN — Medication 5 UNIT(S): at 11:40

## 2018-03-23 RX ADMIN — Medication 3 MILLILITER(S): at 13:31

## 2018-03-23 RX ADMIN — Medication 1 APPLICATION(S): at 17:25

## 2018-03-23 RX ADMIN — HEPARIN SODIUM 5000 UNIT(S): 5000 INJECTION INTRAVENOUS; SUBCUTANEOUS at 06:01

## 2018-03-23 RX ADMIN — PANTOPRAZOLE SODIUM 40 MILLIGRAM(S): 20 TABLET, DELAYED RELEASE ORAL at 06:01

## 2018-03-23 RX ADMIN — Medication 25 MILLIGRAM(S): at 06:01

## 2018-03-23 RX ADMIN — LINEZOLID 600 MILLIGRAM(S): 600 INJECTION, SOLUTION INTRAVENOUS at 06:04

## 2018-03-23 RX ADMIN — Medication 5 UNIT(S): at 11:38

## 2018-03-23 RX ADMIN — Medication 60 MILLIGRAM(S): at 06:01

## 2018-03-23 RX ADMIN — AMLODIPINE BESYLATE 5 MILLIGRAM(S): 2.5 TABLET ORAL at 06:01

## 2018-03-23 RX ADMIN — Medication 1 SUPPOSITORY(S): at 18:37

## 2018-03-23 RX ADMIN — Medication 25 MILLIGRAM(S): at 17:33

## 2018-03-23 RX ADMIN — LINEZOLID 600 MILLIGRAM(S): 600 INJECTION, SOLUTION INTRAVENOUS at 17:33

## 2018-03-23 RX ADMIN — Medication 1 APPLICATION(S): at 06:01

## 2018-03-23 RX ADMIN — HEPARIN SODIUM 5000 UNIT(S): 5000 INJECTION INTRAVENOUS; SUBCUTANEOUS at 13:47

## 2018-03-23 RX ADMIN — Medication 100 MILLIGRAM(S): at 06:01

## 2018-03-23 NOTE — PHYSICAL THERAPY INITIAL EVALUATION ADULT - CRITERIA FOR SKILLED THERAPEUTIC INTERVENTIONS
functional limitations in following categories
functional limitations in following categories/therapy frequency/rehab potential/impairments found

## 2018-03-23 NOTE — PHYSICAL THERAPY INITIAL EVALUATION ADULT - GENERAL OBSERVATIONS, REHAB EVAL
9:40-10:10. Pt encountered sitting in chair in NAD, + ACE bandage R SANTOSH, chair alarm, Nursing student present at b/s., agreeable to PT.
Pt in bed, pleasant and cooperative

## 2018-03-23 NOTE — PHYSICAL THERAPY INITIAL EVALUATION ADULT - PERTINENT HX OF CURRENT PROBLEM, REHAB EVAL
Pt states she fell Monday and went for evaluation to [facility] and laceration was sutured and pt was discharged. Pt came back today for increasing pain in right LE.
pt fell and has wound on RLE. s/p debridement and NPWT.

## 2018-03-23 NOTE — PROGRESS NOTE ADULT - SUBJECTIVE AND OBJECTIVE BOX
LENGTH OF HOSPITAL STAY: 24d    CHIEF COMPLAINT:   Patient is a 73y old  Female who presents with a chief complaint of right leg wound, needs debridement (04 Mar 2018 15:38)      Overnight events:    No acute events overnight    ALLERGIES:  No Known Allergies    MEDICATIONS:  STANDING MEDICATIONS  ALBUTerol    90 MICROgram(s) HFA Inhaler 1 Puff(s) Inhalation every 4 hours  ALBUTerol/ipratropium for Nebulization 3 milliLiter(s) Nebulizer every 6 hours  amLODIPine   Tablet 5 milliGRAM(s) Oral daily  BACItracin   Ointment 1 Application(s) Topical two times a day  dextrose 50% Injectable 12.5 Gram(s) IV Push once  dextrose 50% Injectable 25 Gram(s) IV Push once  dextrose 50% Injectable 25 Gram(s) IV Push once  docusate sodium 100 milliGRAM(s) Oral three times a day  heparin  Injectable 5000 Unit(s) SubCutaneous every 8 hours  hydrocortisone hemorrhoidal Suppository 1 Suppository(s) Rectal daily  insulin glargine Injectable (LANTUS) 10 Unit(s) SubCutaneous at bedtime  insulin lispro Injectable (HumaLOG) 5 Unit(s) SubCutaneous before breakfast  insulin lispro Injectable (HumaLOG) 5 Unit(s) SubCutaneous before lunch  insulin lispro Injectable (HumaLOG) 5 Unit(s) SubCutaneous before dinner  linezolid    Tablet 600 milliGRAM(s) Oral every 12 hours  metoprolol     tartrate 25 milliGRAM(s) Oral two times a day  montelukast 10 milliGRAM(s) Oral at bedtime  nystatin Powder 1 Application(s) Topical every 12 hours  pantoprazole    Tablet 40 milliGRAM(s) Oral before breakfast  predniSONE   Tablet 60 milliGRAM(s) Oral daily  senna 2 Tablet(s) Oral at bedtime  tacrolimus 0.5 milliGRAM(s) Oral at bedtime  tiotropium 18 MICROgram(s) Capsule 1 Capsule(s) Inhalation daily    PRN MEDICATIONS  acetaminophen   Tablet. 650 milliGRAM(s) Oral every 6 hours PRN  dextrose Gel 1 Dose(s) Oral once PRN  glucagon  Injectable 1 milliGRAM(s) IntraMuscular once PRN  ondansetron Injectable 4 milliGRAM(s) IV Push every 8 hours PRN  polyethylene glycol 3350 17 Gram(s) Oral daily PRN    VITALS:   T(F): 97  HR: 73  BP: 157/74  RR: 17  SpO2: 98%    LABS:                        8.7    15.68 )-----------( 214      ( 23 Mar 2018 07:56 )             28.9     03-23    145  |  107  |  26<H>  ----------------------------<  121<H>  4.7   |  24  |  0.9    Ca    8.4<L>      23 Mar 2018 07:56  Phos  3.1     03-23  Mg     2.1     03-23      Cultures:      RADIOLOGY:    PHYSICAL EXAM:  GEN: No acute distress  LUNGS: Clear to auscultation bilaterally   HEART: S1/S2 present. RRR.   ABD: Soft, non-tender, non-distended. Bowel sounds present  EXT: no swelling, right leg wound wrapped  NEURO: AAOX3

## 2018-03-23 NOTE — PROGRESS NOTE ADULT - SUBJECTIVE AND OBJECTIVE BOX
DONI PATRICK  73y  Female  HPI:  Pt states she fell Monday and went for evaluation to [facility] and laceration was sutured and pt was discharged. Pt came back today for increasing pain in right LE. Patient denies calf pain, fevers, chest pain, SOB, abdominal pain, nausea, vomiting, diarrhea, dizziness, weakness. (27 Feb 2018 13:32)    MEDICATIONS  (STANDING):  ALBUTerol    90 MICROgram(s) HFA Inhaler 1 Puff(s) Inhalation every 4 hours  ALBUTerol/ipratropium for Nebulization 3 milliLiter(s) Nebulizer every 6 hours  amLODIPine   Tablet 5 milliGRAM(s) Oral daily  BACItracin   Ointment 1 Application(s) Topical two times a day  dextrose 50% Injectable 12.5 Gram(s) IV Push once  dextrose 50% Injectable 25 Gram(s) IV Push once  dextrose 50% Injectable 25 Gram(s) IV Push once  docusate sodium 100 milliGRAM(s) Oral three times a day  heparin  Injectable 5000 Unit(s) SubCutaneous every 8 hours  hydrocortisone hemorrhoidal Suppository 1 Suppository(s) Rectal daily  insulin glargine Injectable (LANTUS) 10 Unit(s) SubCutaneous at bedtime  insulin lispro Injectable (HumaLOG) 5 Unit(s) SubCutaneous before breakfast  insulin lispro Injectable (HumaLOG) 5 Unit(s) SubCutaneous before lunch  insulin lispro Injectable (HumaLOG) 5 Unit(s) SubCutaneous before dinner  linezolid    Tablet 600 milliGRAM(s) Oral every 12 hours  metoprolol     tartrate 25 milliGRAM(s) Oral two times a day  montelukast 10 milliGRAM(s) Oral at bedtime  nystatin Powder 1 Application(s) Topical every 12 hours  pantoprazole    Tablet 40 milliGRAM(s) Oral before breakfast  predniSONE   Tablet 60 milliGRAM(s) Oral daily  senna 2 Tablet(s) Oral at bedtime  tacrolimus 0.5 milliGRAM(s) Oral at bedtime  tiotropium 18 MICROgram(s) Capsule 1 Capsule(s) Inhalation daily    MEDICATIONS  (PRN):  acetaminophen   Tablet. 650 milliGRAM(s) Oral every 6 hours PRN Moderate Pain (4 - 6)  dextrose Gel 1 Dose(s) Oral once PRN Blood Glucose LESS THAN 70 milliGRAM(s)/deciliter  glucagon  Injectable 1 milliGRAM(s) IntraMuscular once PRN Glucose LESS THAN 70 milligrams/deciliter  ondansetron Injectable 4 milliGRAM(s) IV Push every 8 hours PRN Nausea and/or Vomiting  polyethylene glycol 3350 17 Gram(s) Oral daily PRN Constipation    INTERVAL EVENTS: Patient seen today without distress, son at bedside. Patient remains weak, has difficulty walking , developed SOB, purse mouth breathing.    T(C): 36.1 (03-23-18 @ 13:24), Max: 36.1 (03-23-18 @ 06:01)  HR: 73 (03-23-18 @ 13:24) (73 - 73)  BP: 145/62 (03-23-18 @ 13:24) (144/65 - 157/74)  RR: 18 (03-23-18 @ 13:24) (17 - 18)  SpO2: 98% (03-23-18 @ 07:48) (97% - 98%)  Wt(kg): --Vital Signs Last 24 Hrs  T(C): 36.1 (23 Mar 2018 13:24), Max: 36.1 (23 Mar 2018 06:01)  T(F): 96.9 (23 Mar 2018 13:24), Max: 97 (23 Mar 2018 06:01)  HR: 73 (23 Mar 2018 13:24) (73 - 73)  BP: 145/62 (23 Mar 2018 13:24) (144/65 - 157/74)  BP(mean): --  RR: 18 (23 Mar 2018 13:24) (17 - 18)  SpO2: 98% (23 Mar 2018 07:48) (97% - 98%)    PHYSICAL EXAM:  GENERAL: NAD at rest, O2 in place  NECK: Supple, No JVD  CHEST/LUNG: Essentially clear  HEART: S1, S2, Regular rate and rhythm;   ABDOMEN: Soft, Nontender, Nondistended; Bowel sounds present  EXTREMITIES: trace edema  SKIN: RLE dressing in place    LABS:  Labs:                        8.7    15.68 )-----------( 214      ( 23 Mar 2018 07:56 )             28.9             03-23    145  |  107  |  26<H>  ----------------------------<  121<H>  4.7   |  24  |  0.9    Ca    8.4<L>      23 Mar 2018 07:56  Phos  3.1     03-23  Mg     2.1     03-23      RADIOLOGY & ADDITIONAL TESTS:

## 2018-03-23 NOTE — PHYSICAL THERAPY INITIAL EVALUATION ADULT - IMPAIRMENTS FOUND, PT EVAL
gait, locomotion, and balance/aerobic capacity/endurance
muscle strength/gait, locomotion, and balance/aerobic capacity/endurance

## 2018-03-23 NOTE — PHYSICAL THERAPY INITIAL EVALUATION ADULT - LIVES WITH, PROFILE
spouse/lives in [facility] [address] w/ HR , no steps indoors.
spouse/HOME WITH 4 STAIRS TO ENTER AND NONE INSIDE

## 2018-03-23 NOTE — PROGRESS NOTE ADULT - ASSESSMENT
72 yo female with hx of HTN, autoimmune hepatitis on tacrolimus, h/o PCP pneumonia, asthma, prothrombin gene mutation/DVT in 3/2017, p/w persistent pain s/p surgical wound closure after sustaining mechanical fall at home at [facility], admitted for worsening wound infection who was initially admitted under medical service s/p debridement. Her hospital course was complicated by acute hypoxic RF requiring ICU admission; s/p BAL thought to be 2/2 ORSA/fungal PNA. She was downgraded to the medical floors 3/13 and her problems are being managed as follows --      1.Acute Respiratory Failure, secondary to  DAH and PNA (BAL on 3/9 revealing ORSA and yeast)   -continue with current management  -remains on O2 therapy  -prednisone 60mg po q12  -continue respiratory treatments, Montelukast  -on Zyvox PO  -CTA on (3/20) negative for PE    2.Alveolar Hemorrhage   -resolved    3.LUE thrombophlebitis  - warm compresses     3.Hemorrhoids w/ mild bleeding  -as per GI outpatient colonoscopy will be planned upon discharge    4.Hypercoagulability 2/2 prothrombin mutation, h/o DVT and PE 3/2017  -Coumadin on hold 2/2 DAH, will consider restarting next week    5..Right leg laceration s/p debridement 2/28  -local wound care per burn    6.HTN, BP elevated today  -continue Norvasc and Metoprolol    7.AIH   -continue Tacrolimus  -on high dose steroid for pulm dx    #Diet: low sodium     #Activity: OOB as tolerated  #pt/rehab on board, will speak to patient for possible 4a placement depending on evaluation    #DVT/GI ppx:     #Disposition,  -patient wants to go home with home service eventually.

## 2018-03-23 NOTE — PROGRESS NOTE ADULT - ASSESSMENT
72 yo female with hx of HTN, autoimmune hepatitis on tacrolimus, h/o PCP pneumonia, asthma, prothrombin gene mutation/DVT in 3/2017, p/w persistent pain s/p surgical wound closure after sustaining mechanical fall at home at [facility], admitted for worsening wound infection who was initially admitted under medical service s/p debridement. Her hospital course was complicated by acute hypoxic RF requiring ICU admission; s/p BAL thought to be 2/2 ORSA/fungal PNA. She was downgraded to the medical floors 3/13 and her problems are being managed as follows --      #Acute Respiratory Failure, secondary to  DAH and PNA (BAL on 3/9 revealing ORSA and yeast)   - remains on O2 therapy, still SOB with ambulation, recovers easily  - wean steroids as per pulmonary  - continue respiratory treatments, Montelukast    #LUE thrombophlebitis  - resolved    #Hemorrhoids, Anemia  - responding to treatment, no further bleeding noted  - post transfusion  - monitor CBC , currently stable    #Hypercoagulability 2/2 prothrombin mutation, h/o DVT and PE 3/2017  - Coumadin on hold 2/2 DAH  - consider resuming rx next week    #Right leg laceration s/p debridement 2/28  - local wound care per burn    #HTN  - continue Norvasc and Metoprolol    #AIH   - GI following  - continue Tacrolimus  - on steroid for pulm dx    #Diet: low sodium     #Activity: OOB as tolerated  - Rehab evaluation - bedside therapy    #DVT/GI ppx: SEE (cleared by pulm since pt high risk)    #Disposition, D/c planning  - await rehab recommendations, patient would agree to 4A.     Case discussed with housestaff

## 2018-03-23 NOTE — PROGRESS NOTE ADULT - PROVIDER SPECIALTY LIST ADULT
Burn
Critical Care
Gastroenterology
Infectious Disease
Internal Medicine
Pulmonology
Critical Care
Internal Medicine

## 2018-03-24 PROBLEM — K75.4 AUTOIMMUNE HEPATITIS: Chronic | Status: ACTIVE | Noted: 2018-02-27

## 2018-03-24 PROBLEM — I10 ESSENTIAL (PRIMARY) HYPERTENSION: Chronic | Status: ACTIVE | Noted: 2018-02-27

## 2018-03-24 PROBLEM — J45.909 UNSPECIFIED ASTHMA, UNCOMPLICATED: Chronic | Status: ACTIVE | Noted: 2018-02-27

## 2018-03-24 RX ORDER — HEPARIN SODIUM 5000 [USP'U]/ML
5000 INJECTION INTRAVENOUS; SUBCUTANEOUS EVERY 8 HOURS
Qty: 0 | Refills: 0 | Status: DISCONTINUED | OUTPATIENT
Start: 2018-03-24 | End: 2018-03-24

## 2018-03-24 RX ADMIN — Medication 1 APPLICATION(S): at 07:11

## 2018-03-24 RX ADMIN — Medication 3 MILLILITER(S): at 09:10

## 2018-03-24 RX ADMIN — LINEZOLID 600 MILLIGRAM(S): 600 INJECTION, SOLUTION INTRAVENOUS at 07:11

## 2018-03-24 RX ADMIN — AMLODIPINE BESYLATE 5 MILLIGRAM(S): 2.5 TABLET ORAL at 00:32

## 2018-03-24 RX ADMIN — HEPARIN SODIUM 5000 UNIT(S): 5000 INJECTION INTRAVENOUS; SUBCUTANEOUS at 07:11

## 2018-03-24 RX ADMIN — HEPARIN SODIUM 5000 UNIT(S): 5000 INJECTION INTRAVENOUS; SUBCUTANEOUS at 13:43

## 2018-03-24 RX ADMIN — AMLODIPINE BESYLATE 5 MILLIGRAM(S): 2.5 TABLET ORAL at 22:23

## 2018-03-24 RX ADMIN — Medication 100 MILLIGRAM(S): at 07:11

## 2018-03-24 RX ADMIN — Medication 3 MILLILITER(S): at 20:03

## 2018-03-24 RX ADMIN — Medication 100 MILLIGRAM(S): at 22:22

## 2018-03-24 RX ADMIN — Medication 25 MILLIGRAM(S): at 18:33

## 2018-03-24 RX ADMIN — Medication 5 UNIT(S): at 13:43

## 2018-03-24 RX ADMIN — MONTELUKAST 10 MILLIGRAM(S): 4 TABLET, CHEWABLE ORAL at 22:24

## 2018-03-24 RX ADMIN — Medication 1 APPLICATION(S): at 18:33

## 2018-03-24 RX ADMIN — NYSTATIN CREAM 1 APPLICATION(S): 100000 CREAM TOPICAL at 07:11

## 2018-03-24 RX ADMIN — Medication 1 SUPPOSITORY(S): at 00:32

## 2018-03-24 RX ADMIN — Medication 60 MILLIGRAM(S): at 00:32

## 2018-03-24 RX ADMIN — HEPARIN SODIUM 5000 UNIT(S): 5000 INJECTION INTRAVENOUS; SUBCUTANEOUS at 22:23

## 2018-03-24 RX ADMIN — Medication 3 MILLILITER(S): at 14:48

## 2018-03-24 RX ADMIN — INSULIN GLARGINE 10 UNIT(S): 100 INJECTION, SOLUTION SUBCUTANEOUS at 22:23

## 2018-03-24 RX ADMIN — LINEZOLID 600 MILLIGRAM(S): 600 INJECTION, SOLUTION INTRAVENOUS at 18:33

## 2018-03-24 RX ADMIN — PANTOPRAZOLE SODIUM 40 MILLIGRAM(S): 20 TABLET, DELAYED RELEASE ORAL at 07:11

## 2018-03-24 RX ADMIN — Medication 60 MILLIGRAM(S): at 22:29

## 2018-03-24 RX ADMIN — Medication 25 MILLIGRAM(S): at 07:11

## 2018-03-24 RX ADMIN — SENNA PLUS 2 TABLET(S): 8.6 TABLET ORAL at 22:23

## 2018-03-24 RX ADMIN — TACROLIMUS 0.5 MILLIGRAM(S): 5 CAPSULE ORAL at 22:23

## 2018-03-25 RX ADMIN — Medication 60 MILLIGRAM(S): at 22:34

## 2018-03-25 RX ADMIN — LINEZOLID 600 MILLIGRAM(S): 600 INJECTION, SOLUTION INTRAVENOUS at 19:15

## 2018-03-25 RX ADMIN — MONTELUKAST 10 MILLIGRAM(S): 4 TABLET, CHEWABLE ORAL at 22:34

## 2018-03-25 RX ADMIN — INSULIN GLARGINE 10 UNIT(S): 100 INJECTION, SOLUTION SUBCUTANEOUS at 22:33

## 2018-03-25 RX ADMIN — HEPARIN SODIUM 5000 UNIT(S): 5000 INJECTION INTRAVENOUS; SUBCUTANEOUS at 22:33

## 2018-03-25 RX ADMIN — Medication 3 MILLILITER(S): at 13:38

## 2018-03-25 RX ADMIN — Medication 1 APPLICATION(S): at 06:23

## 2018-03-25 RX ADMIN — Medication 5 UNIT(S): at 12:15

## 2018-03-25 RX ADMIN — TACROLIMUS 0.5 MILLIGRAM(S): 5 CAPSULE ORAL at 22:34

## 2018-03-25 RX ADMIN — AMLODIPINE BESYLATE 5 MILLIGRAM(S): 2.5 TABLET ORAL at 22:33

## 2018-03-25 RX ADMIN — HEPARIN SODIUM 5000 UNIT(S): 5000 INJECTION INTRAVENOUS; SUBCUTANEOUS at 12:19

## 2018-03-25 RX ADMIN — Medication 3 MILLILITER(S): at 20:08

## 2018-03-25 RX ADMIN — HEPARIN SODIUM 5000 UNIT(S): 5000 INJECTION INTRAVENOUS; SUBCUTANEOUS at 06:24

## 2018-03-25 RX ADMIN — NYSTATIN CREAM 1 APPLICATION(S): 100000 CREAM TOPICAL at 06:43

## 2018-03-25 RX ADMIN — Medication 5 UNIT(S): at 08:32

## 2018-03-25 RX ADMIN — Medication 25 MILLIGRAM(S): at 18:56

## 2018-03-25 RX ADMIN — Medication 100 MILLIGRAM(S): at 06:24

## 2018-03-25 RX ADMIN — NYSTATIN CREAM 1 APPLICATION(S): 100000 CREAM TOPICAL at 08:34

## 2018-03-25 RX ADMIN — PANTOPRAZOLE SODIUM 40 MILLIGRAM(S): 20 TABLET, DELAYED RELEASE ORAL at 06:23

## 2018-03-25 RX ADMIN — LINEZOLID 600 MILLIGRAM(S): 600 INJECTION, SOLUTION INTRAVENOUS at 06:24

## 2018-03-25 RX ADMIN — Medication 25 MILLIGRAM(S): at 06:23

## 2018-03-25 RX ADMIN — NYSTATIN CREAM 1 APPLICATION(S): 100000 CREAM TOPICAL at 18:55

## 2018-03-26 ENCOUNTER — TRANSCRIPTION ENCOUNTER (OUTPATIENT)
Age: 73
End: 2018-03-26

## 2018-03-26 DIAGNOSIS — L97.819 NON-PRESSURE CHRONIC ULCER OF OTHER PART OF RIGHT LOWER LEG WITH UNSPECIFIED SEVERITY: ICD-10-CM

## 2018-03-26 DIAGNOSIS — D64.9 ANEMIA, UNSPECIFIED: ICD-10-CM

## 2018-03-26 DIAGNOSIS — I80.8 PHLEBITIS AND THROMBOPHLEBITIS OF OTHER SITES: ICD-10-CM

## 2018-03-26 DIAGNOSIS — K64.9 UNSPECIFIED HEMORRHOIDS: ICD-10-CM
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
## 2018-03-26 DIAGNOSIS — Z79.52 LONG TERM (CURRENT) USE OF SYSTEMIC STEROIDS: ICD-10-CM

## 2018-03-26 DIAGNOSIS — S81.811D LACERATION WITHOUT FOREIGN BODY, RIGHT LOWER LEG, SUBSEQUENT ENCOUNTER: ICD-10-CM

## 2018-03-26 DIAGNOSIS — J44.9 CHRONIC OBSTRUCTIVE PULMONARY DISEASE, UNSPECIFIED: ICD-10-CM

## 2018-03-26 DIAGNOSIS — Z79.01 LONG TERM (CURRENT) USE OF ANTICOAGULANTS: ICD-10-CM

## 2018-03-26 DIAGNOSIS — K75.4 AUTOIMMUNE HEPATITIS: ICD-10-CM

## 2018-03-26 DIAGNOSIS — Z86.718 PERSONAL HISTORY OF OTHER VENOUS THROMBOSIS AND EMBOLISM: ICD-10-CM

## 2018-03-26 DIAGNOSIS — J16.8 PNEUMONIA DUE TO OTHER SPECIFIED INFECTIOUS ORGANISMS: ICD-10-CM

## 2018-03-26 DIAGNOSIS — R04.89 HEMORRHAGE FROM OTHER SITES IN RESPIRATORY PASSAGES: ICD-10-CM

## 2018-03-26 DIAGNOSIS — D68.59 OTHER PRIMARY THROMBOPHILIA: ICD-10-CM

## 2018-03-26 DIAGNOSIS — N18.3 CHRONIC KIDNEY DISEASE, STAGE 3 (MODERATE): ICD-10-CM

## 2018-03-26 DIAGNOSIS — D68.52 PROTHROMBIN GENE MUTATION: ICD-10-CM

## 2018-03-26 DIAGNOSIS — Z99.81 DEPENDENCE ON SUPPLEMENTAL OXYGEN: ICD-10-CM

## 2018-03-26 DIAGNOSIS — I96 GANGRENE, NOT ELSEWHERE CLASSIFIED: ICD-10-CM

## 2018-03-26 DIAGNOSIS — J96.01 ACUTE RESPIRATORY FAILURE WITH HYPOXIA: ICD-10-CM

## 2018-03-26 DIAGNOSIS — I12.9 HYPERTENSIVE CHRONIC KIDNEY DISEASE WITH STAGE 1 THROUGH STAGE 4 CHRONIC KIDNEY DISEASE, OR UNSPECIFIED CHRONIC KIDNEY DISEASE: ICD-10-CM

## 2018-03-26 DIAGNOSIS — J15.212 PNEUMONIA DUE TO METHICILLIN RESISTANT STAPHYLOCOCCUS AUREUS: ICD-10-CM

## 2018-03-26 DIAGNOSIS — N17.9 ACUTE KIDNEY FAILURE, UNSPECIFIED: ICD-10-CM

## 2018-03-26 DIAGNOSIS — J45.909 UNSPECIFIED ASTHMA, UNCOMPLICATED: ICD-10-CM

## 2018-03-26 RX ORDER — WARFARIN SODIUM 2.5 MG/1
5 TABLET ORAL ONCE
Qty: 0 | Refills: 0 | Status: COMPLETED | OUTPATIENT
Start: 2018-03-26 | End: 2018-03-26

## 2018-03-26 RX ADMIN — Medication 1 APPLICATION(S): at 06:11

## 2018-03-26 RX ADMIN — SENNA PLUS 2 TABLET(S): 8.6 TABLET ORAL at 21:57

## 2018-03-26 RX ADMIN — HEPARIN SODIUM 5000 UNIT(S): 5000 INJECTION INTRAVENOUS; SUBCUTANEOUS at 06:11

## 2018-03-26 RX ADMIN — Medication 25 MILLIGRAM(S): at 06:12

## 2018-03-26 RX ADMIN — Medication 100 MILLIGRAM(S): at 13:32

## 2018-03-26 RX ADMIN — Medication 100 MILLIGRAM(S): at 21:56

## 2018-03-26 RX ADMIN — WARFARIN SODIUM 5 MILLIGRAM(S): 2.5 TABLET ORAL at 21:56

## 2018-03-26 RX ADMIN — HEPARIN SODIUM 5000 UNIT(S): 5000 INJECTION INTRAVENOUS; SUBCUTANEOUS at 13:33

## 2018-03-26 RX ADMIN — Medication 3 MILLILITER(S): at 13:15

## 2018-03-26 RX ADMIN — Medication 5 UNIT(S): at 08:48

## 2018-03-26 RX ADMIN — NYSTATIN CREAM 1 APPLICATION(S): 100000 CREAM TOPICAL at 06:12

## 2018-03-26 RX ADMIN — Medication 3 MILLILITER(S): at 08:40

## 2018-03-26 RX ADMIN — MONTELUKAST 10 MILLIGRAM(S): 4 TABLET, CHEWABLE ORAL at 21:56

## 2018-03-26 RX ADMIN — INSULIN GLARGINE 10 UNIT(S): 100 INJECTION, SOLUTION SUBCUTANEOUS at 21:56

## 2018-03-26 RX ADMIN — Medication 1 APPLICATION(S): at 18:29

## 2018-03-26 RX ADMIN — Medication 25 MILLIGRAM(S): at 18:28

## 2018-03-26 RX ADMIN — PANTOPRAZOLE SODIUM 40 MILLIGRAM(S): 20 TABLET, DELAYED RELEASE ORAL at 08:47

## 2018-03-26 RX ADMIN — Medication 3 MILLILITER(S): at 19:59

## 2018-03-26 RX ADMIN — Medication 5 UNIT(S): at 12:10

## 2018-03-26 RX ADMIN — LINEZOLID 600 MILLIGRAM(S): 600 INJECTION, SOLUTION INTRAVENOUS at 06:12

## 2018-03-26 RX ADMIN — TACROLIMUS 0.5 MILLIGRAM(S): 5 CAPSULE ORAL at 21:56

## 2018-03-26 RX ADMIN — NYSTATIN CREAM 1 APPLICATION(S): 100000 CREAM TOPICAL at 18:28

## 2018-03-26 NOTE — DISCHARGE NOTE ADULT - ADDITIONAL INSTRUCTIONS
Burn - Dr. Kang/ Dr. Cary  Pulm - Dr. Price  ID - Dr. Gotti  GI - Dr. Nichols at [facility], or Dr. Galvez at [place]  PMD - Dr. Hanson  Heme - Dr. Isidro Please follow up with the following doctors:   Burn - Dr. Kang/ Dr. Luis Daniel Kolb - Dr. Price  ID - Dr. Gotti  GI - Dr. Nichols at [facility], or Dr. Galvez at [place]  PMD - Dr. Margie Hobbs - Dr. Isidro

## 2018-03-26 NOTE — DISCHARGE NOTE ADULT - MEDICATION SUMMARY - MEDICATIONS TO TAKE
I will START or STAY ON the medications listed below when I get home from the hospital:    predniSONE 20 mg oral tablet  -- 2 tab(s) by mouth once a day  -- Indication: For Autoimmune hepatitis, acute respiratory failure    acetaminophen 325 mg oral tablet  -- 2 tab(s) by mouth every 6 hours, As needed, pain  -- Indication: For pain    Lantus Solostar Pen 100 units/mL subcutaneous solution  -- 9 unit(s) subcutaneous once a day (at bedtime)   -- Do not drink alcoholic beverages when taking this medication.  It is very important that you take or use this exactly as directed.  Do not skip doses or discontinue unless directed by your doctor.  Keep in refrigerator.  Do not freeze.    -- Indication: For elevated blood sugars due to steroids    metoprolol tartrate 25 mg oral tablet  -- 1 tab(s) by mouth 2 times a day  -- Indication: For blood pressure    tiotropium 18 mcg inhalation capsule  -- 1 cap(s) inhaled once a day  -- Indication: For asthma    albuterol 90 mcg/inh inhalation aerosol  -- 1 puff(s) inhaled every 4 hours, As Needed for shortness of breath  -- Indication: For asthma    amLODIPine 5 mg oral tablet  -- 1 tab(s) by mouth once a day  -- Indication: For blood pressure    bacitracin 500 units/g topical ointment  -- 1 application on skin 2 times a day to affected area rue at time of dressing change  -- Indication: For R arm abrasion    hydrocortisone 25 mg rectal suppository  -- 1 suppository(ies) rectally once a day  -- Indication: For hemmorhoids    nystatin 100,000 units/g topical powder  -- 1 application on skin every 12 hours to area of skin rash  -- Indication: For Rash in armpit and groin    vancomycin 125 mg oral capsule  -- 1 cap(s) by mouth every 6 hours MDD:Take final dose on night of 4/12 to complete course of medication  -- Indication: For C. diff    tacrolimus 0.5 mg oral capsule  -- 1 cap(s) by mouth once a day (at bedtime)  -- Indication: For autoimmune hepatitis    montelukast 10 mg oral tablet  -- 1 tab(s) by mouth once a day (at bedtime)  -- Indication: For asthma    pantoprazole 40 mg oral delayed release tablet  -- 1 tab(s) by mouth once a day (before a meal)  -- Indication: For gerd

## 2018-03-26 NOTE — DISCHARGE NOTE ADULT - PLAN OF CARE
Healing and rehab -patient going home with home care. continue home exercises Autoimmune hepatitis -continue tacrolimus and prednisone. F/U with Gastroenterolgy Asthma -Continue home medications Hx DVT Continue warfarin. Follow up with coumadin clinic 4/12/18 at 815 AM C. Diff colitis complete course of vancomycin. Last dose 4/12 in the evening Elevatd blood sugars due to steroids Continue insulin on discharge, follow up with primary care and pulmonology for reduction of insulin, and assessment for oral medication vs stopping insulin once steroids have been reduced or stopped. -continue tacrolimus and prednisone. F/U with Gastroenterology. Recommend outpatient colonoscopy Asthma withy recent respiratory failure, MRSA pneumonia -Continue home medications, continue steroids, follow up with pulmonology and gastroenterology for steroid weaning Continue warfarin. Follow up with coumadin clinic 4/12/18 at 815 AM    Coumadin:  3/26: INR 1.13 > 5mg  3/27: INR 1.06 > 5mg  3/28: INR 2.06 > 1mg  3/29: INR 2.88 > 0.5mg  3/30 INR 3.06 > HELD  3/31 INR 2.41 > 1mg  4/1: INR 2.09 > 1mg  4/2: INR 1.85 > 1mg  4/3: INR 1.59 > 1mg  4/4: INR 1.35 > 5mg coumadin, lovenox 40mg x1  4/5: INR 1.57 > 5mg  4/6: INR 2.3 > 2mg   4/7: INR 3.28> 1mg  4/8 coumadin held  4/9: INR 2.92 > 1mg Elevated blood sugars due to steroids -continue tacrolimus and prednisone. F/U with Gastroenterology. Recommend outpatient colonoscopy. Follow with them for management of immunosuppression. Continue insulin at bedtime on discharge, follow up with primary care and pulmonology for reduction of insulin, and assessment for oral medication vs stopping insulin once steroids have been reduced or stopped.

## 2018-03-26 NOTE — DISCHARGE NOTE ADULT - HOSPITAL COURSE
HPI:   Dx: CIM, Respiratory Failure, Alveolar hemorrhage, MRSA PNA    73F with PMH below slipped on ice and fell on 2/19, sustaining laceration to Rt shin s/p repair in ED and was discharged. Patient was readmitted on 2/27 for increasing pain in RLE, s/p excisional debridement of RLE [id number] necrotic tissue with wound vac placement by Dr. Kang on 2/28. Wound CX postitive for MRSA; no abscess so Abx were DC'd by ID.    Hospital course complicated by respiratory failure and upgrade to ICU. CTA negative for PE. Patient tx with solumedrol. BAL performed on 3/9 and showed pulmonary alveolar hemorrhage, interstitial pneumonitis. BAL positive for MRSA, few yeast (could be colonizer); ID rec zyvox. Other issues include Left antecubital fossa nonsuppurative phlebitis resolved with warm compress. Low GI bleed s/p transfusion, seen by GI ddx diverticulosis/hemorrhoidal/neoplasm/recent alvolar bleed; rec observation for now, trend CBC, colonoscopy as outpatient.     Of note, patient has hx Autoimmune hepatitis on tacrolimus followed at [place] by Dr. Galvez (Dx 2015; Liver bx showing severe autoimmune hepatitis grade 4/4, stage 2/4, AIH vs DILI-AIH). At home: On Tacrolimus 1 mg am and 0.5 mg pm, Prednisone 10 mg qd, Budesonide 3 mg po q12h. Per GI, no further recs other than keeping her on the same medications for AIH without evidence for pulmonary toxicity    PMH: Hx PE/LLE DVT 2017 (on Coumadin), Hypercoagulability 2/2 heterozygous prothrombin gene mutation, Asthma, HTN, Autoimmune hepatitis    Prior functional status: Indep with ADLs    Admission Physical Exam:  Vital signs stable. Afebrile  Gen: alert, NAD  Cardio: RRR  Lungs: clear  Abd: soft, NT  Extremities: without edema. PROM wnl. RLE wound covered in dressing, clean, dry, intact  Neuro:  Cranial nerves: CN2-12 grossly intact  Motor Power: 5/5 b/l  Sensation: intact    Hospital Course: The patient was admitted to the acute inpatient rehab unit presenting with a decline in functional status. The patient participated in three hours of multidisciplinary therapy 5-6 days per week. The patient was continued on all home medications or equivalent alternatives as deemed appropriate. The patient received prophylactic anticoagulation medication and was monitored closely with no complications. During the stay on the inpatient unit, the patient showed as much progress as had been anticipated and was cleared for discharge by a multidisciplinary team.    Patient was resumed on anticoagulation for her DVT 2/2 hypercoagulability, she is to follow up with coumadin clinic 2 days after discharge on 4/12/18 at 815 AM. Patient developed C diff colitis during stay and is being treated with oral vancomycin, she is to finish treatment on the evening of 4/12/18. Patient developed fungal rash in armpit, and groin area that is improving with nystatin powder, she will continue on discharge until rash resolves. Patient needs to follow up with her outside GI doctor or Dr Nichols at [facility] for management of her autoimmune hepatitis. Patient to f/u with Dr Miguel cullen for mangament of asthma, and f/u of MRSA pneumonia and steroid weaning.     Discharge functional status:   Ambulation: mod I 150' w/RW.   Bed mob: indep  Transfers: indep/mod I    Discharge disposition: to home with home care HPI:   Dx: CIM, Respiratory Failure, Alveolar hemorrhage, MRSA PNA    73F with PMH below slipped on ice and fell on 2/19, sustaining laceration to Rt shin s/p repair in ED and was discharged. Patient was readmitted on 2/27 for increasing pain in RLE, s/p excisional debridement of RLE [id number] necrotic tissue with wound vac placement by Dr. Kang on 2/28. Wound CX postitive for MRSA; no abscess so Abx were DC'd by ID.    Hospital course complicated by respiratory failure and upgrade to ICU. CTA negative for PE. Patient tx with solumedrol. BAL performed on 3/9 and showed pulmonary alveolar hemorrhage, interstitial pneumonitis. BAL positive for MRSA, few yeast (could be colonizer); ID rec zyvox. Other issues include Left antecubital fossa nonsuppurative phlebitis resolved with warm compress. Low GI bleed s/p transfusion, seen by GI ddx diverticulosis/hemorrhoidal/neoplasm/recent alvolar bleed; rec observation for now, trend CBC, colonoscopy as outpatient.     Of note, patient has hx Autoimmune hepatitis on tacrolimus followed at [place] by Dr. Galvez (Dx 2015; Liver bx showing severe autoimmune hepatitis grade 4/4, stage 2/4, AIH vs DILI-AIH). At home: On Tacrolimus 1 mg am and 0.5 mg pm, Prednisone 10 mg qd, Budesonide 3 mg po q12h. Per GI, no further recs other than keeping her on the same medications for AIH without evidence for pulmonary toxicity    PMH: Hx PE/LLE DVT 2017 (on Coumadin), Hypercoagulability 2/2 heterozygous prothrombin gene mutation, Asthma, HTN, Autoimmune hepatitis    Prior functional status: Indep with ADLs    Admission Physical Exam:  Vital signs stable. Afebrile  Gen: alert, NAD  Cardio: RRR  Lungs: clear  Abd: soft, NT  Extremities: without edema. PROM wnl. RLE wound covered in dressing, clean, dry, intact  Neuro:  Cranial nerves: CN2-12 grossly intact  Motor Power: 5/5 b/l  Sensation: intact    Hospital Course: The patient was admitted to the acute inpatient rehab unit presenting with a decline in functional status. The patient participated in three hours of multidisciplinary therapy 5-6 days per week. The patient was continued on all home medications or equivalent alternatives as deemed appropriate. The patient received prophylactic anticoagulation medication and was monitored closely with no complications. During the stay on the inpatient unit, the patient showed as much progress as had been anticipated and was cleared for discharge by a multidisciplinary team.    Patient was resumed on anticoagulation for her DVT 2/2 hypercoagulability, she is to follow up with coumadin clinic 2 days after discharge on 4/12/18 at 815 AM. Patient developed C diff colitis during stay and is being treated with oral vancomycin, she is to finish treatment on the evening of 4/12/18. Patient developed fungal rash in armpit, and groin area that is improving with nystatin powder, she will continue on discharge until rash resolves. Patient needs to follow up with her outside GI doctor or Dr Nichols at [facility] for management of her autoimmune hepatitis. Patient to f/u with Dr Rivera pulmonmary kay for mangament of asthma, and f/u of MRSA pneumonia and steroid weaning. Patient endorses already having an adequate supply of the majority of her home medications. Prescriptions were sent for those she requested refills for, new medications, and supplies.    Discharge functional status:   Ambulation: mod I 150' w/RW.   Bed mob: indep  Transfers: indep/mod I    Discharge disposition: to home with home care HPI:   Dx: CIM, Respiratory Failure, Alveolar hemorrhage, MRSA PNA    73F with PMH below slipped on ice and fell on 2/19, sustaining laceration to Rt shin s/p repair in ED and was discharged. Patient was readmitted on 2/27 for increasing pain in RLE, s/p excisional debridement of RLE [id number] necrotic tissue with wound vac placement by Dr. Kang on 2/28. Wound CX postitive for MRSA; no abscess so Abx were DC'd by ID.    Hospital course complicated by respiratory failure and upgrade to ICU. CTA negative for PE. Patient tx with solumedrol. BAL performed on 3/9 and showed pulmonary alveolar hemorrhage, interstitial pneumonitis. BAL positive for MRSA, few yeast (could be colonizer); ID rec zyvox. Other issues include Left antecubital fossa nonsuppurative phlebitis resolved with warm compress. Low GI bleed s/p transfusion, seen by GI ddx diverticulosis/hemorrhoidal/neoplasm/recent alvolar bleed; rec observation for now, trend CBC, colonoscopy as outpatient.     Of note, patient has hx Autoimmune hepatitis on tacrolimus followed at [place] by Dr. Galvez (Dx 2015; Liver bx showing severe autoimmune hepatitis grade 4/4, stage 2/4, AIH vs DILI-AIH). At home: On Tacrolimus 1 mg am and 0.5 mg pm, Prednisone 10 mg qd, Budesonide 3 mg po q12h. Per GI, no further recs other than keeping her on the same medications for AIH without evidence for pulmonary toxicity    PMH: Hx PE/LLE DVT 2017 (on Coumadin), Hypercoagulability 2/2 heterozygous prothrombin gene mutation, Asthma, HTN, Autoimmune hepatitis    Prior functional status: Indep with ADLs    Admission Physical Exam:  Vital signs stable. Afebrile  Gen: alert, NAD  Cardio: RRR  Lungs: clear  Abd: soft, NT  Extremities: without edema. PROM wnl. RLE wound covered in dressing, clean, dry, intact  Neuro:  Cranial nerves: CN2-12 grossly intact  Motor Power: 5/5 b/l  Sensation: intact    Hospital Course: The patient was admitted to the acute inpatient rehab unit presenting with a decline in functional status. The patient participated in three hours of multidisciplinary therapy 5-6 days per week. The patient was continued on all home medications or equivalent alternatives as deemed appropriate. The patient received prophylactic anticoagulation medication and was monitored closely with no complications. During the stay on the inpatient unit, the patient showed as much progress as had been anticipated and was cleared for discharge by a multidisciplinary team.    Patient was resumed on anticoagulation for her DVT 2/2 hypercoagulability, she is to follow up with coumadin clinic 2 days after discharge on 4/12/18 at 815 AM. Patient developed C diff colitis during stay and is being treated with oral vancomycin, she is to finish treatment on the evening of 4/12/18. Patient developed fungal rash in armpit, and groin area that is improving with nystatin powder, she will continue on discharge until rash resolves. Patient needs to follow up with her outside GI doctor or Dr Nichols at [facility] for management of her autoimmune hepatitis. Patient to f/u with Dr Miguel cullen for mangament of asthma, and f/u of MRSA pneumonia and steroid weaning. Patient requiring insulin due to high dose steroids causing elevated fingerstick blood sugars. Patient taught how to perform self FSBS check, and how to inject insulin. Patient to f/u with PCP, pulm, and GI for monitoring of regimen needed as steroids are weaned down. Patient endorses already having an adequate supply of the majority of her home medications. Prescriptions were sent for those she requested refills for, new medications, and supplies.    Discharge functional status:   Ambulation: mod I 150' w/RW.   Bed mob: indep  Transfers: indep/mod I    Discharge disposition: to home with home care

## 2018-03-26 NOTE — DISCHARGE NOTE ADULT - CARE PROVIDER_API CALL
Enrike Kang), Plastic Surgery  [address] [place] [address]  Phone: [phone]  Fax: [phone]    Jt Price), Critical Care Medicine; Pulmonary Disease; Sleep Medicine  [address] [address]  Phone: [phone]  Fax: [phone]    Isaiah Gotti), Infectious Disease; Internal Medicine  [address] [place] [address]  Phone: [phone]  Fax: [phone]    Ren Nichols (DO), Gastroenterology  [address] [place] [address]  Phone: [phone]  Fax: [phone]

## 2018-03-26 NOTE — DISCHARGE NOTE ADULT - CARE PROVIDERS DIRECT ADDRESSES
,[email or web address],DirectAddress_Unknown,DirectAddress_Unknown,DirectAddress_Unknown

## 2018-03-26 NOTE — DISCHARGE NOTE ADULT - PATIENT PORTAL LINK FT
You can access the [facility] Patient Portal, offered by [facility], by registering with the following website: [email or web address]

## 2018-03-26 NOTE — DISCHARGE NOTE ADULT - MEDICATION SUMMARY - MEDICATIONS TO STOP TAKING
I will STOP taking the medications listed below when I get home from the hospital:    budesonide 3 mg oral capsule, extended release  -- 1 cap(s) by mouth 2 times a day    oxyCODONE-acetaminophen 5 mg-325 mg oral tablet  -- 1 tab(s) by mouth every 6 hours, As Needed -Moderate Pain (4 - 6) MDD:4    ciprofloxacin 500 mg oral tablet  -- 1 tab(s) by mouth every 12 hours    predniSONE 20 mg oral tablet  -- 3 tab(s) by mouth once a day for 1 week then 2 tab daily    heparin  -- 5000 unit(s) subcutaneous 3 times a day    insulin lispro  -- 5 unit(s) subcutaneous 3 times a day (before meals)    senna oral tablet  -- 2 tab(s) by mouth once a day (at bedtime)    docusate sodium 100 mg oral capsule  -- 1 cap(s) by mouth 3 times a day    linezolid 600 mg oral tablet  -- 1 tab(s) by mouth every 12 hours

## 2018-03-26 NOTE — DISCHARGE NOTE ADULT - CARE PLAN
Principal Discharge DX:	Critical illness myopathy  Goal:	Healing and rehab  Assessment and plan of treatment:	-patient going home with home care. continue home exercises  Goal:	Autoimmune hepatitis  Assessment and plan of treatment:	-continue tacrolimus and prednisone. F/U with Gastroenterolgy  Goal:	Asthma  Assessment and plan of treatment:	-Continue home medications  Goal:	Hx DVT  Assessment and plan of treatment:	Continue warfarin. Follow up with coumadin clinic 4/12/18 at 815 AM  Goal:	C. Diff colitis  Assessment and plan of treatment:	complete course of vancomycin. Last dose 4/12 in the evening  Goal:	Elevatd blood sugars due to steroids  Assessment and plan of treatment:	Continue insulin on discharge, follow up with primary care and pulmonology for reduction of insulin, and assessment for oral medication vs stopping insulin once steroids have been reduced or stopped. Principal Discharge DX:	Critical illness myopathy  Goal:	Healing and rehab  Assessment and plan of treatment:	-patient going home with home care. continue home exercises  Goal:	Autoimmune hepatitis  Assessment and plan of treatment:	-continue tacrolimus and prednisone. F/U with Gastroenterology. Recommend outpatient colonoscopy  Goal:	Asthma withy recent respiratory failure, MRSA pneumonia  Assessment and plan of treatment:	-Continue home medications, continue steroids, follow up with pulmonology and gastroenterology for steroid weaning  Goal:	Hx DVT  Assessment and plan of treatment:	Continue warfarin. Follow up with coumadin clinic 4/12/18 at 815 AM    Coumadin:  3/26: INR 1.13 > 5mg  3/27: INR 1.06 > 5mg  3/28: INR 2.06 > 1mg  3/29: INR 2.88 > 0.5mg  3/30 INR 3.06 > HELD  3/31 INR 2.41 > 1mg  4/1: INR 2.09 > 1mg  4/2: INR 1.85 > 1mg  4/3: INR 1.59 > 1mg  4/4: INR 1.35 > 5mg coumadin, lovenox 40mg x1  4/5: INR 1.57 > 5mg  4/6: INR 2.3 > 2mg   4/7: INR 3.28> 1mg  4/8 coumadin held  4/9: INR 2.92 > 1mg  Goal:	C. Diff colitis  Assessment and plan of treatment:	complete course of vancomycin. Last dose 4/12 in the evening  Goal:	Elevated blood sugars due to steroids  Assessment and plan of treatment:	Continue insulin on discharge, follow up with primary care and pulmonology for reduction of insulin, and assessment for oral medication vs stopping insulin once steroids have been reduced or stopped. Principal Discharge DX:	Critical illness myopathy  Goal:	Healing and rehab  Assessment and plan of treatment:	-patient going home with home care. continue home exercises  Goal:	Autoimmune hepatitis  Assessment and plan of treatment:	-continue tacrolimus and prednisone. F/U with Gastroenterology. Recommend outpatient colonoscopy. Follow with them for management of immunosuppression.  Goal:	Asthma withy recent respiratory failure, MRSA pneumonia  Assessment and plan of treatment:	-Continue home medications, continue steroids, follow up with pulmonology and gastroenterology for steroid weaning  Goal:	Hx DVT  Assessment and plan of treatment:	Continue warfarin. Follow up with coumadin clinic 4/12/18 at 815 AM    Coumadin:  3/26: INR 1.13 > 5mg  3/27: INR 1.06 > 5mg  3/28: INR 2.06 > 1mg  3/29: INR 2.88 > 0.5mg  3/30 INR 3.06 > HELD  3/31 INR 2.41 > 1mg  4/1: INR 2.09 > 1mg  4/2: INR 1.85 > 1mg  4/3: INR 1.59 > 1mg  4/4: INR 1.35 > 5mg coumadin, lovenox 40mg x1  4/5: INR 1.57 > 5mg  4/6: INR 2.3 > 2mg   4/7: INR 3.28> 1mg  4/8 coumadin held  4/9: INR 2.92 > 1mg  Goal:	C. Diff colitis  Assessment and plan of treatment:	complete course of vancomycin. Last dose 4/12 in the evening  Goal:	Elevated blood sugars due to steroids  Assessment and plan of treatment:	Continue insulin on discharge, follow up with primary care and pulmonology for reduction of insulin, and assessment for oral medication vs stopping insulin once steroids have been reduced or stopped. Principal Discharge DX:	Critical illness myopathy  Goal:	Healing and rehab  Assessment and plan of treatment:	-patient going home with home care. continue home exercises  Goal:	Autoimmune hepatitis  Assessment and plan of treatment:	-continue tacrolimus and prednisone. F/U with Gastroenterology. Recommend outpatient colonoscopy. Follow with them for management of immunosuppression.  Goal:	Asthma withy recent respiratory failure, MRSA pneumonia  Assessment and plan of treatment:	-Continue home medications, continue steroids, follow up with pulmonology and gastroenterology for steroid weaning  Goal:	Hx DVT  Assessment and plan of treatment:	Continue warfarin. Follow up with coumadin clinic 4/12/18 at 815 AM    Coumadin:  3/26: INR 1.13 > 5mg  3/27: INR 1.06 > 5mg  3/28: INR 2.06 > 1mg  3/29: INR 2.88 > 0.5mg  3/30 INR 3.06 > HELD  3/31 INR 2.41 > 1mg  4/1: INR 2.09 > 1mg  4/2: INR 1.85 > 1mg  4/3: INR 1.59 > 1mg  4/4: INR 1.35 > 5mg coumadin, lovenox 40mg x1  4/5: INR 1.57 > 5mg  4/6: INR 2.3 > 2mg   4/7: INR 3.28> 1mg  4/8 coumadin held  4/9: INR 2.92 > 1mg  Goal:	C. Diff colitis  Assessment and plan of treatment:	complete course of vancomycin. Last dose 4/12 in the evening  Goal:	Elevated blood sugars due to steroids  Assessment and plan of treatment:	Continue insulin at bedtime on discharge, follow up with primary care and pulmonology for reduction of insulin, and assessment for oral medication vs stopping insulin once steroids have been reduced or stopped.

## 2018-03-27 LAB
ALBUMIN SERPL ELPH-MCNC: 3.3 G/DL — LOW (ref 3.5–5.2)
ALP SERPL-CCNC: 118 U/L — HIGH (ref 30–115)
ALT FLD-CCNC: 44 U/L — HIGH (ref 0–41)
ANION GAP SERPL CALC-SCNC: 14 MMOL/L — SIGNIFICANT CHANGE UP (ref 7–14)
AST SERPL-CCNC: 14 U/L — SIGNIFICANT CHANGE UP (ref 0–41)
BILIRUB SERPL-MCNC: 1 MG/DL — SIGNIFICANT CHANGE UP (ref 0.2–1.2)
BUN SERPL-MCNC: 17 MG/DL — SIGNIFICANT CHANGE UP (ref 10–20)
CALCIUM SERPL-MCNC: 7.8 MG/DL — LOW (ref 8.5–10.1)
CHLORIDE SERPL-SCNC: 102 MMOL/L — SIGNIFICANT CHANGE UP (ref 98–110)
CO2 SERPL-SCNC: 25 MMOL/L — SIGNIFICANT CHANGE UP (ref 17–32)
CREAT SERPL-MCNC: 0.9 MG/DL — SIGNIFICANT CHANGE UP (ref 0.7–1.5)
GLUCOSE SERPL-MCNC: 184 MG/DL — HIGH (ref 70–99)
HCT VFR BLD CALC: 28.2 % — LOW (ref 37–47)
HGB BLD-MCNC: 8.7 G/DL — LOW (ref 12–16)
MCHC RBC-ENTMCNC: 23.7 PG — LOW (ref 27–31)
MCHC RBC-ENTMCNC: 30.9 G/DL — LOW (ref 32–37)
MCV RBC AUTO: 76.8 FL — LOW (ref 81–99)
NRBC # BLD: 0 /100 WBCS — SIGNIFICANT CHANGE UP (ref 0–0)
PLATELET # BLD AUTO: 105 K/UL — LOW (ref 130–400)
POTASSIUM SERPL-MCNC: 4.6 MMOL/L — SIGNIFICANT CHANGE UP (ref 3.5–5)
POTASSIUM SERPL-SCNC: 4.6 MMOL/L — SIGNIFICANT CHANGE UP (ref 3.5–5)
PROT SERPL-MCNC: 5.2 G/DL — LOW (ref 6–8)
RBC # BLD: 3.67 M/UL — LOW (ref 4.2–5.4)
RBC # FLD: 20.3 % — HIGH (ref 11.5–14.5)
SODIUM SERPL-SCNC: 141 MMOL/L — SIGNIFICANT CHANGE UP (ref 135–146)
WBC # BLD: 6.58 K/UL — SIGNIFICANT CHANGE UP (ref 4.8–10.8)
WBC # FLD AUTO: 6.58 K/UL — SIGNIFICANT CHANGE UP (ref 4.8–10.8)

## 2018-03-27 RX ORDER — WARFARIN SODIUM 2.5 MG/1
5 TABLET ORAL ONCE
Qty: 0 | Refills: 0 | Status: COMPLETED | OUTPATIENT
Start: 2018-03-27 | End: 2018-03-27

## 2018-03-27 RX ORDER — HEPARIN SODIUM 5000 [USP'U]/ML
5000 INJECTION INTRAVENOUS; SUBCUTANEOUS EVERY 8 HOURS
Qty: 0 | Refills: 0 | Status: DISCONTINUED | OUTPATIENT
Start: 2018-03-27 | End: 2018-03-28

## 2018-03-27 RX ADMIN — Medication 100 MILLIGRAM(S): at 06:14

## 2018-03-27 RX ADMIN — SENNA PLUS 2 TABLET(S): 8.6 TABLET ORAL at 21:45

## 2018-03-27 RX ADMIN — Medication 5 UNIT(S): at 12:47

## 2018-03-27 RX ADMIN — HEPARIN SODIUM 5000 UNIT(S): 5000 INJECTION INTRAVENOUS; SUBCUTANEOUS at 13:55

## 2018-03-27 RX ADMIN — Medication 5 UNIT(S): at 08:24

## 2018-03-27 RX ADMIN — Medication 100 MILLIGRAM(S): at 21:45

## 2018-03-27 RX ADMIN — Medication 1 APPLICATION(S): at 18:47

## 2018-03-27 RX ADMIN — INSULIN GLARGINE 10 UNIT(S): 100 INJECTION, SOLUTION SUBCUTANEOUS at 21:45

## 2018-03-27 RX ADMIN — TACROLIMUS 0.5 MILLIGRAM(S): 5 CAPSULE ORAL at 21:45

## 2018-03-27 RX ADMIN — Medication 60 MILLIGRAM(S): at 23:52

## 2018-03-27 RX ADMIN — PANTOPRAZOLE SODIUM 40 MILLIGRAM(S): 20 TABLET, DELAYED RELEASE ORAL at 06:14

## 2018-03-27 RX ADMIN — WARFARIN SODIUM 5 MILLIGRAM(S): 2.5 TABLET ORAL at 21:45

## 2018-03-27 RX ADMIN — Medication 25 MILLIGRAM(S): at 18:47

## 2018-03-27 RX ADMIN — NYSTATIN CREAM 1 APPLICATION(S): 100000 CREAM TOPICAL at 18:48

## 2018-03-27 RX ADMIN — MONTELUKAST 10 MILLIGRAM(S): 4 TABLET, CHEWABLE ORAL at 21:45

## 2018-03-27 RX ADMIN — Medication 3 MILLILITER(S): at 20:22

## 2018-03-27 RX ADMIN — Medication 5 UNIT(S): at 16:55

## 2018-03-27 RX ADMIN — AMLODIPINE BESYLATE 5 MILLIGRAM(S): 2.5 TABLET ORAL at 23:52

## 2018-03-27 RX ADMIN — Medication 60 MILLIGRAM(S): at 00:04

## 2018-03-27 RX ADMIN — HEPARIN SODIUM 5000 UNIT(S): 5000 INJECTION INTRAVENOUS; SUBCUTANEOUS at 21:45

## 2018-03-27 RX ADMIN — Medication 3 MILLILITER(S): at 08:44

## 2018-03-27 RX ADMIN — NYSTATIN CREAM 1 APPLICATION(S): 100000 CREAM TOPICAL at 05:30

## 2018-03-27 RX ADMIN — AMLODIPINE BESYLATE 5 MILLIGRAM(S): 2.5 TABLET ORAL at 00:04

## 2018-03-27 RX ADMIN — Medication 25 MILLIGRAM(S): at 05:29

## 2018-03-28 RX ORDER — WARFARIN SODIUM 2.5 MG/1
1 TABLET ORAL ONCE
Qty: 0 | Refills: 0 | Status: COMPLETED | OUTPATIENT
Start: 2018-03-28 | End: 2018-03-28

## 2018-03-28 RX ORDER — BACITRACIN ZINC 500 UNIT/G
1 OINTMENT IN PACKET (EA) TOPICAL DAILY
Qty: 0 | Refills: 0 | Status: DISCONTINUED | OUTPATIENT
Start: 2018-03-28 | End: 2018-04-10

## 2018-03-28 RX ADMIN — TACROLIMUS 0.5 MILLIGRAM(S): 5 CAPSULE ORAL at 22:45

## 2018-03-28 RX ADMIN — Medication 1 APPLICATION(S): at 13:17

## 2018-03-28 RX ADMIN — MONTELUKAST 10 MILLIGRAM(S): 4 TABLET, CHEWABLE ORAL at 22:47

## 2018-03-28 RX ADMIN — HEPARIN SODIUM 5000 UNIT(S): 5000 INJECTION INTRAVENOUS; SUBCUTANEOUS at 05:42

## 2018-03-28 RX ADMIN — Medication 3 MILLILITER(S): at 20:32

## 2018-03-28 RX ADMIN — Medication 25 MILLIGRAM(S): at 17:56

## 2018-03-28 RX ADMIN — SENNA PLUS 2 TABLET(S): 8.6 TABLET ORAL at 22:45

## 2018-03-28 RX ADMIN — WARFARIN SODIUM 1 MILLIGRAM(S): 2.5 TABLET ORAL at 22:46

## 2018-03-28 RX ADMIN — Medication 25 MILLIGRAM(S): at 05:41

## 2018-03-28 RX ADMIN — Medication 60 MILLIGRAM(S): at 23:51

## 2018-03-28 RX ADMIN — Medication 5 UNIT(S): at 08:50

## 2018-03-28 RX ADMIN — INSULIN GLARGINE 10 UNIT(S): 100 INJECTION, SOLUTION SUBCUTANEOUS at 22:46

## 2018-03-28 RX ADMIN — AMLODIPINE BESYLATE 5 MILLIGRAM(S): 2.5 TABLET ORAL at 23:51

## 2018-03-28 RX ADMIN — Medication 100 MILLIGRAM(S): at 13:15

## 2018-03-28 RX ADMIN — Medication 5 UNIT(S): at 13:16

## 2018-03-28 RX ADMIN — Medication 100 MILLIGRAM(S): at 22:46

## 2018-03-28 RX ADMIN — Medication 5 UNIT(S): at 17:56

## 2018-03-28 RX ADMIN — NYSTATIN CREAM 1 APPLICATION(S): 100000 CREAM TOPICAL at 17:57

## 2018-03-28 RX ADMIN — Medication 100 MILLIGRAM(S): at 05:41

## 2018-03-28 RX ADMIN — Medication 3 MILLILITER(S): at 08:02

## 2018-03-28 RX ADMIN — PANTOPRAZOLE SODIUM 40 MILLIGRAM(S): 20 TABLET, DELAYED RELEASE ORAL at 07:34

## 2018-03-29 RX ORDER — WARFARIN SODIUM 2.5 MG/1
0.5 TABLET ORAL ONCE
Qty: 0 | Refills: 0 | Status: COMPLETED | OUTPATIENT
Start: 2018-03-29 | End: 2018-03-29

## 2018-03-29 RX ORDER — VANCOMYCIN HCL 1 G
125 VIAL (EA) INTRAVENOUS EVERY 6 HOURS
Qty: 0 | Refills: 0 | Status: DISCONTINUED | OUTPATIENT
Start: 2018-03-29 | End: 2018-04-10

## 2018-03-29 RX ADMIN — Medication 3 MILLILITER(S): at 21:12

## 2018-03-29 RX ADMIN — Medication 40 MILLIGRAM(S): at 08:25

## 2018-03-29 RX ADMIN — Medication 5 UNIT(S): at 18:49

## 2018-03-29 RX ADMIN — Medication 125 MILLIGRAM(S): at 23:16

## 2018-03-29 RX ADMIN — INSULIN GLARGINE 10 UNIT(S): 100 INJECTION, SOLUTION SUBCUTANEOUS at 21:37

## 2018-03-29 RX ADMIN — MONTELUKAST 10 MILLIGRAM(S): 4 TABLET, CHEWABLE ORAL at 21:32

## 2018-03-29 RX ADMIN — PANTOPRAZOLE SODIUM 40 MILLIGRAM(S): 20 TABLET, DELAYED RELEASE ORAL at 07:17

## 2018-03-29 RX ADMIN — Medication 5 UNIT(S): at 08:25

## 2018-03-29 RX ADMIN — Medication 1 APPLICATION(S): at 12:14

## 2018-03-29 RX ADMIN — NYSTATIN CREAM 1 APPLICATION(S): 100000 CREAM TOPICAL at 05:46

## 2018-03-29 RX ADMIN — Medication 125 MILLIGRAM(S): at 18:48

## 2018-03-29 RX ADMIN — TACROLIMUS 0.5 MILLIGRAM(S): 5 CAPSULE ORAL at 21:32

## 2018-03-29 RX ADMIN — WARFARIN SODIUM 0.5 MILLIGRAM(S): 2.5 TABLET ORAL at 21:33

## 2018-03-29 RX ADMIN — Medication 3 MILLILITER(S): at 08:09

## 2018-03-29 RX ADMIN — AMLODIPINE BESYLATE 5 MILLIGRAM(S): 2.5 TABLET ORAL at 23:20

## 2018-03-29 RX ADMIN — NYSTATIN CREAM 1 APPLICATION(S): 100000 CREAM TOPICAL at 18:50

## 2018-03-29 RX ADMIN — Medication 25 MILLIGRAM(S): at 18:50

## 2018-03-29 RX ADMIN — Medication 5 UNIT(S): at 14:14

## 2018-03-29 RX ADMIN — Medication 25 MILLIGRAM(S): at 05:47

## 2018-03-30 RX ADMIN — PANTOPRAZOLE SODIUM 40 MILLIGRAM(S): 20 TABLET, DELAYED RELEASE ORAL at 06:59

## 2018-03-30 RX ADMIN — Medication 125 MILLIGRAM(S): at 23:06

## 2018-03-30 RX ADMIN — Medication 3 MILLILITER(S): at 20:36

## 2018-03-30 RX ADMIN — MONTELUKAST 10 MILLIGRAM(S): 4 TABLET, CHEWABLE ORAL at 21:58

## 2018-03-30 RX ADMIN — Medication 100 MILLIGRAM(S): at 21:58

## 2018-03-30 RX ADMIN — Medication 1 APPLICATION(S): at 13:00

## 2018-03-30 RX ADMIN — Medication 25 MILLIGRAM(S): at 18:27

## 2018-03-30 RX ADMIN — Medication 125 MILLIGRAM(S): at 07:00

## 2018-03-30 RX ADMIN — Medication 650 MILLIGRAM(S): at 21:58

## 2018-03-30 RX ADMIN — Medication 25 MILLIGRAM(S): at 06:59

## 2018-03-30 RX ADMIN — AMLODIPINE BESYLATE 5 MILLIGRAM(S): 2.5 TABLET ORAL at 23:09

## 2018-03-30 RX ADMIN — NYSTATIN CREAM 1 APPLICATION(S): 100000 CREAM TOPICAL at 06:59

## 2018-03-30 RX ADMIN — Medication 5 UNIT(S): at 08:29

## 2018-03-30 RX ADMIN — Medication 40 MILLIGRAM(S): at 07:00

## 2018-03-30 RX ADMIN — Medication 125 MILLIGRAM(S): at 18:27

## 2018-03-30 RX ADMIN — TACROLIMUS 0.5 MILLIGRAM(S): 5 CAPSULE ORAL at 21:59

## 2018-03-30 RX ADMIN — Medication 5 UNIT(S): at 12:55

## 2018-03-30 RX ADMIN — Medication 5 UNIT(S): at 18:26

## 2018-03-30 RX ADMIN — SENNA PLUS 2 TABLET(S): 8.6 TABLET ORAL at 21:59

## 2018-03-30 RX ADMIN — Medication 125 MILLIGRAM(S): at 12:55

## 2018-03-30 RX ADMIN — Medication 3 MILLILITER(S): at 07:51

## 2018-03-30 RX ADMIN — NYSTATIN CREAM 1 APPLICATION(S): 100000 CREAM TOPICAL at 18:29

## 2018-03-31 LAB
ALBUMIN SERPL ELPH-MCNC: 3.4 G/DL — LOW (ref 3.5–5.2)
ALP SERPL-CCNC: 133 U/L — HIGH (ref 30–115)
ALT FLD-CCNC: 41 U/L — SIGNIFICANT CHANGE UP (ref 0–41)
ANION GAP SERPL CALC-SCNC: 13 MMOL/L — SIGNIFICANT CHANGE UP (ref 7–14)
AST SERPL-CCNC: 19 U/L — SIGNIFICANT CHANGE UP (ref 0–41)
BILIRUB SERPL-MCNC: 0.9 MG/DL — SIGNIFICANT CHANGE UP (ref 0.2–1.2)
BUN SERPL-MCNC: 18 MG/DL — SIGNIFICANT CHANGE UP (ref 10–20)
CALCIUM SERPL-MCNC: 8.4 MG/DL — LOW (ref 8.5–10.1)
CHLORIDE SERPL-SCNC: 105 MMOL/L — SIGNIFICANT CHANGE UP (ref 98–110)
CO2 SERPL-SCNC: 27 MMOL/L — SIGNIFICANT CHANGE UP (ref 17–32)
CREAT SERPL-MCNC: 1 MG/DL — SIGNIFICANT CHANGE UP (ref 0.7–1.5)
GLUCOSE SERPL-MCNC: 140 MG/DL — HIGH (ref 70–99)
INR BLD: 2.41 RATIO — HIGH (ref 0.65–1.3)
POTASSIUM SERPL-MCNC: 3.5 MMOL/L — SIGNIFICANT CHANGE UP (ref 3.5–5)
POTASSIUM SERPL-SCNC: 3.5 MMOL/L — SIGNIFICANT CHANGE UP (ref 3.5–5)
PROT SERPL-MCNC: 5.5 G/DL — LOW (ref 6–8)
PROTHROM AB SERPL-ACNC: 26.5 SEC — HIGH (ref 9.95–12.87)
SODIUM SERPL-SCNC: 145 MMOL/L — SIGNIFICANT CHANGE UP (ref 135–146)

## 2018-03-31 RX ORDER — WARFARIN SODIUM 2.5 MG/1
1 TABLET ORAL ONCE
Qty: 0 | Refills: 0 | Status: COMPLETED | OUTPATIENT
Start: 2018-03-31 | End: 2018-03-31

## 2018-03-31 RX ADMIN — MONTELUKAST 10 MILLIGRAM(S): 4 TABLET, CHEWABLE ORAL at 22:43

## 2018-03-31 RX ADMIN — Medication 125 MILLIGRAM(S): at 06:43

## 2018-03-31 RX ADMIN — Medication 25 MILLIGRAM(S): at 18:06

## 2018-03-31 RX ADMIN — Medication 25 MILLIGRAM(S): at 06:41

## 2018-03-31 RX ADMIN — PANTOPRAZOLE SODIUM 40 MILLIGRAM(S): 20 TABLET, DELAYED RELEASE ORAL at 06:41

## 2018-03-31 RX ADMIN — Medication 40 MILLIGRAM(S): at 06:42

## 2018-03-31 RX ADMIN — INSULIN GLARGINE 10 UNIT(S): 100 INJECTION, SOLUTION SUBCUTANEOUS at 22:42

## 2018-03-31 RX ADMIN — Medication 125 MILLIGRAM(S): at 12:08

## 2018-03-31 RX ADMIN — Medication 3 MILLILITER(S): at 08:41

## 2018-03-31 RX ADMIN — AMLODIPINE BESYLATE 5 MILLIGRAM(S): 2.5 TABLET ORAL at 23:02

## 2018-03-31 RX ADMIN — Medication 125 MILLIGRAM(S): at 18:06

## 2018-03-31 RX ADMIN — Medication 1 APPLICATION(S): at 12:09

## 2018-03-31 RX ADMIN — Medication 125 MILLIGRAM(S): at 23:01

## 2018-03-31 RX ADMIN — Medication 3 MILLILITER(S): at 20:50

## 2018-03-31 RX ADMIN — Medication 3 MILLILITER(S): at 14:28

## 2018-03-31 RX ADMIN — WARFARIN SODIUM 1 MILLIGRAM(S): 2.5 TABLET ORAL at 22:43

## 2018-03-31 RX ADMIN — TACROLIMUS 0.5 MILLIGRAM(S): 5 CAPSULE ORAL at 22:43

## 2018-03-31 RX ADMIN — NYSTATIN CREAM 1 APPLICATION(S): 100000 CREAM TOPICAL at 06:42

## 2018-03-31 RX ADMIN — Medication 100 MILLIGRAM(S): at 06:41

## 2018-03-31 RX ADMIN — NYSTATIN CREAM 1 APPLICATION(S): 100000 CREAM TOPICAL at 18:06

## 2018-03-31 RX ADMIN — Medication 5 UNIT(S): at 08:21

## 2018-03-31 RX ADMIN — Medication 5 UNIT(S): at 18:04

## 2018-03-31 RX ADMIN — Medication 5 UNIT(S): at 12:05

## 2018-04-01 RX ADMIN — Medication 125 MILLIGRAM(S): at 11:42

## 2018-04-01 RX ADMIN — Medication 25 MILLIGRAM(S): at 06:02

## 2018-04-01 RX ADMIN — PANTOPRAZOLE SODIUM 40 MILLIGRAM(S): 20 TABLET, DELAYED RELEASE ORAL at 06:02

## 2018-04-01 RX ADMIN — Medication 125 MILLIGRAM(S): at 18:30

## 2018-04-01 RX ADMIN — Medication 100 MILLIGRAM(S): at 21:24

## 2018-04-01 RX ADMIN — Medication 5 UNIT(S): at 11:41

## 2018-04-01 RX ADMIN — TACROLIMUS 0.5 MILLIGRAM(S): 5 CAPSULE ORAL at 21:24

## 2018-04-01 RX ADMIN — Medication 3 MILLILITER(S): at 20:11

## 2018-04-01 RX ADMIN — AMLODIPINE BESYLATE 5 MILLIGRAM(S): 2.5 TABLET ORAL at 23:34

## 2018-04-01 RX ADMIN — Medication 3 MILLILITER(S): at 07:25

## 2018-04-01 RX ADMIN — Medication 25 MILLIGRAM(S): at 18:30

## 2018-04-01 RX ADMIN — Medication 1 APPLICATION(S): at 11:43

## 2018-04-01 RX ADMIN — SENNA PLUS 2 TABLET(S): 8.6 TABLET ORAL at 21:24

## 2018-04-01 RX ADMIN — MONTELUKAST 10 MILLIGRAM(S): 4 TABLET, CHEWABLE ORAL at 21:24

## 2018-04-01 RX ADMIN — Medication 125 MILLIGRAM(S): at 23:34

## 2018-04-01 RX ADMIN — INSULIN GLARGINE 10 UNIT(S): 100 INJECTION, SOLUTION SUBCUTANEOUS at 21:24

## 2018-04-01 RX ADMIN — NYSTATIN CREAM 1 APPLICATION(S): 100000 CREAM TOPICAL at 18:30

## 2018-04-01 RX ADMIN — Medication 5 UNIT(S): at 08:23

## 2018-04-01 RX ADMIN — Medication 125 MILLIGRAM(S): at 06:02

## 2018-04-01 RX ADMIN — Medication 40 MILLIGRAM(S): at 06:02

## 2018-04-01 RX ADMIN — NYSTATIN CREAM 1 APPLICATION(S): 100000 CREAM TOPICAL at 06:03

## 2018-04-02 RX ORDER — WARFARIN SODIUM 2.5 MG/1
1 TABLET ORAL ONCE
Qty: 0 | Refills: 0 | Status: COMPLETED | OUTPATIENT
Start: 2018-04-02 | End: 2018-04-02

## 2018-04-02 RX ADMIN — Medication 3 MILLILITER(S): at 07:56

## 2018-04-02 RX ADMIN — NYSTATIN CREAM 1 APPLICATION(S): 100000 CREAM TOPICAL at 06:10

## 2018-04-02 RX ADMIN — Medication 5 UNIT(S): at 11:54

## 2018-04-02 RX ADMIN — Medication 25 MILLIGRAM(S): at 06:09

## 2018-04-02 RX ADMIN — Medication 100 MILLIGRAM(S): at 06:09

## 2018-04-02 RX ADMIN — MONTELUKAST 10 MILLIGRAM(S): 4 TABLET, CHEWABLE ORAL at 22:20

## 2018-04-02 RX ADMIN — NYSTATIN CREAM 1 APPLICATION(S): 100000 CREAM TOPICAL at 18:00

## 2018-04-02 RX ADMIN — Medication 125 MILLIGRAM(S): at 23:30

## 2018-04-02 RX ADMIN — AMLODIPINE BESYLATE 5 MILLIGRAM(S): 2.5 TABLET ORAL at 23:29

## 2018-04-02 RX ADMIN — PANTOPRAZOLE SODIUM 40 MILLIGRAM(S): 20 TABLET, DELAYED RELEASE ORAL at 06:09

## 2018-04-02 RX ADMIN — Medication 25 MILLIGRAM(S): at 17:58

## 2018-04-02 RX ADMIN — Medication 1 APPLICATION(S): at 11:55

## 2018-04-02 RX ADMIN — Medication 100 MILLIGRAM(S): at 22:20

## 2018-04-02 RX ADMIN — INSULIN GLARGINE 10 UNIT(S): 100 INJECTION, SOLUTION SUBCUTANEOUS at 22:20

## 2018-04-02 RX ADMIN — TACROLIMUS 0.5 MILLIGRAM(S): 5 CAPSULE ORAL at 22:29

## 2018-04-02 RX ADMIN — Medication 125 MILLIGRAM(S): at 06:10

## 2018-04-02 RX ADMIN — SENNA PLUS 2 TABLET(S): 8.6 TABLET ORAL at 22:20

## 2018-04-02 RX ADMIN — Medication 125 MILLIGRAM(S): at 17:58

## 2018-04-02 RX ADMIN — Medication 125 MILLIGRAM(S): at 11:55

## 2018-04-02 RX ADMIN — Medication 5 UNIT(S): at 16:51

## 2018-04-02 RX ADMIN — Medication 5 UNIT(S): at 07:59

## 2018-04-02 RX ADMIN — Medication 40 MILLIGRAM(S): at 06:09

## 2018-04-02 RX ADMIN — WARFARIN SODIUM 1 MILLIGRAM(S): 2.5 TABLET ORAL at 22:29

## 2018-04-03 RX ORDER — WARFARIN SODIUM 2.5 MG/1
1 TABLET ORAL ONCE
Qty: 0 | Refills: 0 | Status: COMPLETED | OUTPATIENT
Start: 2018-04-03 | End: 2018-04-03

## 2018-04-03 RX ADMIN — MONTELUKAST 10 MILLIGRAM(S): 4 TABLET, CHEWABLE ORAL at 21:38

## 2018-04-03 RX ADMIN — Medication 3 MILLILITER(S): at 20:09

## 2018-04-03 RX ADMIN — TACROLIMUS 0.5 MILLIGRAM(S): 5 CAPSULE ORAL at 21:38

## 2018-04-03 RX ADMIN — INSULIN GLARGINE 10 UNIT(S): 100 INJECTION, SOLUTION SUBCUTANEOUS at 21:38

## 2018-04-03 RX ADMIN — NYSTATIN CREAM 1 APPLICATION(S): 100000 CREAM TOPICAL at 18:29

## 2018-04-03 RX ADMIN — Medication 25 MILLIGRAM(S): at 18:23

## 2018-04-03 RX ADMIN — Medication 40 MILLIGRAM(S): at 06:31

## 2018-04-03 RX ADMIN — Medication 3 MILLILITER(S): at 08:39

## 2018-04-03 RX ADMIN — Medication 5 UNIT(S): at 09:26

## 2018-04-03 RX ADMIN — Medication 125 MILLIGRAM(S): at 17:42

## 2018-04-03 RX ADMIN — Medication 1 APPLICATION(S): at 17:48

## 2018-04-03 RX ADMIN — Medication 5 UNIT(S): at 18:29

## 2018-04-03 RX ADMIN — Medication 125 MILLIGRAM(S): at 06:31

## 2018-04-03 RX ADMIN — Medication 100 MILLIGRAM(S): at 06:31

## 2018-04-03 RX ADMIN — Medication 5 UNIT(S): at 12:55

## 2018-04-03 RX ADMIN — NYSTATIN CREAM 1 APPLICATION(S): 100000 CREAM TOPICAL at 05:13

## 2018-04-03 RX ADMIN — Medication 125 MILLIGRAM(S): at 12:55

## 2018-04-03 RX ADMIN — Medication 100 MILLIGRAM(S): at 17:49

## 2018-04-03 RX ADMIN — PANTOPRAZOLE SODIUM 40 MILLIGRAM(S): 20 TABLET, DELAYED RELEASE ORAL at 06:31

## 2018-04-03 RX ADMIN — Medication 25 MILLIGRAM(S): at 06:31

## 2018-04-03 RX ADMIN — WARFARIN SODIUM 1 MILLIGRAM(S): 2.5 TABLET ORAL at 21:38

## 2018-04-04 RX ORDER — ENOXAPARIN SODIUM 100 MG/ML
40 INJECTION SUBCUTANEOUS ONCE
Qty: 0 | Refills: 0 | Status: COMPLETED | OUTPATIENT
Start: 2018-04-04 | End: 2018-04-04

## 2018-04-04 RX ORDER — NYSTATIN CREAM 100000 [USP'U]/G
1 CREAM TOPICAL
Qty: 0 | Refills: 0 | Status: DISCONTINUED | OUTPATIENT
Start: 2018-04-04 | End: 2018-04-10

## 2018-04-04 RX ORDER — WARFARIN SODIUM 2.5 MG/1
5 TABLET ORAL ONCE
Qty: 0 | Refills: 0 | Status: COMPLETED | OUTPATIENT
Start: 2018-04-04 | End: 2018-04-04

## 2018-04-04 RX ADMIN — Medication 5 UNIT(S): at 08:32

## 2018-04-04 RX ADMIN — NYSTATIN CREAM 1 APPLICATION(S): 100000 CREAM TOPICAL at 05:45

## 2018-04-04 RX ADMIN — Medication 3 MILLILITER(S): at 19:55

## 2018-04-04 RX ADMIN — Medication 3 MILLILITER(S): at 08:18

## 2018-04-04 RX ADMIN — NYSTATIN CREAM 1 APPLICATION(S): 100000 CREAM TOPICAL at 17:28

## 2018-04-04 RX ADMIN — Medication 125 MILLIGRAM(S): at 17:26

## 2018-04-04 RX ADMIN — Medication 3 MILLILITER(S): at 13:39

## 2018-04-04 RX ADMIN — WARFARIN SODIUM 5 MILLIGRAM(S): 2.5 TABLET ORAL at 22:42

## 2018-04-04 RX ADMIN — Medication 125 MILLIGRAM(S): at 23:05

## 2018-04-04 RX ADMIN — PANTOPRAZOLE SODIUM 40 MILLIGRAM(S): 20 TABLET, DELAYED RELEASE ORAL at 06:30

## 2018-04-04 RX ADMIN — AMLODIPINE BESYLATE 5 MILLIGRAM(S): 2.5 TABLET ORAL at 00:08

## 2018-04-04 RX ADMIN — TACROLIMUS 0.5 MILLIGRAM(S): 5 CAPSULE ORAL at 22:41

## 2018-04-04 RX ADMIN — Medication 25 MILLIGRAM(S): at 06:31

## 2018-04-04 RX ADMIN — Medication 5 UNIT(S): at 13:06

## 2018-04-04 RX ADMIN — Medication 125 MILLIGRAM(S): at 00:09

## 2018-04-04 RX ADMIN — Medication 5 UNIT(S): at 17:26

## 2018-04-04 RX ADMIN — NYSTATIN CREAM 1 APPLICATION(S): 100000 CREAM TOPICAL at 17:34

## 2018-04-04 RX ADMIN — Medication 125 MILLIGRAM(S): at 13:02

## 2018-04-04 RX ADMIN — AMLODIPINE BESYLATE 5 MILLIGRAM(S): 2.5 TABLET ORAL at 23:06

## 2018-04-04 RX ADMIN — Medication 1 APPLICATION(S): at 13:02

## 2018-04-04 RX ADMIN — MONTELUKAST 10 MILLIGRAM(S): 4 TABLET, CHEWABLE ORAL at 22:42

## 2018-04-04 RX ADMIN — INSULIN GLARGINE 10 UNIT(S): 100 INJECTION, SOLUTION SUBCUTANEOUS at 22:41

## 2018-04-04 RX ADMIN — Medication 125 MILLIGRAM(S): at 06:30

## 2018-04-04 RX ADMIN — Medication 25 MILLIGRAM(S): at 17:26

## 2018-04-04 RX ADMIN — Medication 40 MILLIGRAM(S): at 06:30

## 2018-04-05 LAB
24R-OH-CALCIDIOL SERPL-MCNC: 19 NG/ML — LOW (ref 30–80)
CULTURE RESULTS: SIGNIFICANT CHANGE UP
SPECIMEN SOURCE: SIGNIFICANT CHANGE UP

## 2018-04-05 RX ORDER — WARFARIN SODIUM 2.5 MG/1
5 TABLET ORAL ONCE
Qty: 0 | Refills: 0 | Status: COMPLETED | OUTPATIENT
Start: 2018-04-05 | End: 2018-04-05

## 2018-04-05 RX ADMIN — Medication 3 MILLILITER(S): at 08:48

## 2018-04-05 RX ADMIN — Medication 5 UNIT(S): at 12:34

## 2018-04-05 RX ADMIN — WARFARIN SODIUM 5 MILLIGRAM(S): 2.5 TABLET ORAL at 22:23

## 2018-04-05 RX ADMIN — PANTOPRAZOLE SODIUM 40 MILLIGRAM(S): 20 TABLET, DELAYED RELEASE ORAL at 06:54

## 2018-04-05 RX ADMIN — Medication 125 MILLIGRAM(S): at 06:53

## 2018-04-05 RX ADMIN — Medication 1 APPLICATION(S): at 12:39

## 2018-04-05 RX ADMIN — Medication 5 UNIT(S): at 17:49

## 2018-04-05 RX ADMIN — NYSTATIN CREAM 1 APPLICATION(S): 100000 CREAM TOPICAL at 06:55

## 2018-04-05 RX ADMIN — NYSTATIN CREAM 1 APPLICATION(S): 100000 CREAM TOPICAL at 17:53

## 2018-04-05 RX ADMIN — Medication 125 MILLIGRAM(S): at 17:52

## 2018-04-05 RX ADMIN — Medication 125 MILLIGRAM(S): at 12:38

## 2018-04-05 RX ADMIN — Medication 40 MILLIGRAM(S): at 06:53

## 2018-04-05 RX ADMIN — Medication 25 MILLIGRAM(S): at 17:52

## 2018-04-05 RX ADMIN — MONTELUKAST 10 MILLIGRAM(S): 4 TABLET, CHEWABLE ORAL at 22:22

## 2018-04-05 RX ADMIN — INSULIN GLARGINE 10 UNIT(S): 100 INJECTION, SOLUTION SUBCUTANEOUS at 22:23

## 2018-04-05 RX ADMIN — Medication 5 UNIT(S): at 08:23

## 2018-04-05 RX ADMIN — TACROLIMUS 0.5 MILLIGRAM(S): 5 CAPSULE ORAL at 22:23

## 2018-04-05 RX ADMIN — Medication 125 MILLIGRAM(S): at 23:31

## 2018-04-05 RX ADMIN — Medication 25 MILLIGRAM(S): at 06:53

## 2018-04-05 RX ADMIN — AMLODIPINE BESYLATE 5 MILLIGRAM(S): 2.5 TABLET ORAL at 23:32

## 2018-04-05 RX ADMIN — NYSTATIN CREAM 1 APPLICATION(S): 100000 CREAM TOPICAL at 06:53

## 2018-04-06 RX ORDER — INSULIN LISPRO 100/ML
3 VIAL (ML) SUBCUTANEOUS
Qty: 0 | Refills: 0 | Status: DISCONTINUED | OUTPATIENT
Start: 2018-04-06 | End: 2018-04-10

## 2018-04-06 RX ORDER — WARFARIN SODIUM 2.5 MG/1
2 TABLET ORAL ONCE
Qty: 0 | Refills: 0 | Status: COMPLETED | OUTPATIENT
Start: 2018-04-06 | End: 2018-04-06

## 2018-04-06 RX ORDER — INSULIN GLARGINE 100 [IU]/ML
9 INJECTION, SOLUTION SUBCUTANEOUS AT BEDTIME
Qty: 0 | Refills: 0 | Status: DISCONTINUED | OUTPATIENT
Start: 2018-04-06 | End: 2018-04-10

## 2018-04-06 RX ORDER — INSULIN LISPRO 100/ML
2 VIAL (ML) SUBCUTANEOUS
Qty: 0 | Refills: 0 | Status: DISCONTINUED | OUTPATIENT
Start: 2018-04-06 | End: 2018-04-06

## 2018-04-06 RX ADMIN — INSULIN GLARGINE 9 UNIT(S): 100 INJECTION, SOLUTION SUBCUTANEOUS at 22:15

## 2018-04-06 RX ADMIN — Medication 3 MILLILITER(S): at 09:06

## 2018-04-06 RX ADMIN — Medication 3 MILLILITER(S): at 14:20

## 2018-04-06 RX ADMIN — Medication 3 UNIT(S): at 17:48

## 2018-04-06 RX ADMIN — Medication 25 MILLIGRAM(S): at 06:37

## 2018-04-06 RX ADMIN — Medication 125 MILLIGRAM(S): at 12:22

## 2018-04-06 RX ADMIN — Medication 5 UNIT(S): at 08:25

## 2018-04-06 RX ADMIN — Medication 25 MILLIGRAM(S): at 17:53

## 2018-04-06 RX ADMIN — PANTOPRAZOLE SODIUM 40 MILLIGRAM(S): 20 TABLET, DELAYED RELEASE ORAL at 06:40

## 2018-04-06 RX ADMIN — Medication 40 MILLIGRAM(S): at 06:37

## 2018-04-06 RX ADMIN — NYSTATIN CREAM 1 APPLICATION(S): 100000 CREAM TOPICAL at 17:53

## 2018-04-06 RX ADMIN — NYSTATIN CREAM 1 APPLICATION(S): 100000 CREAM TOPICAL at 06:43

## 2018-04-06 RX ADMIN — Medication 125 MILLIGRAM(S): at 06:37

## 2018-04-06 RX ADMIN — NYSTATIN CREAM 1 APPLICATION(S): 100000 CREAM TOPICAL at 06:39

## 2018-04-06 RX ADMIN — MONTELUKAST 10 MILLIGRAM(S): 4 TABLET, CHEWABLE ORAL at 22:15

## 2018-04-06 RX ADMIN — WARFARIN SODIUM 2 MILLIGRAM(S): 2.5 TABLET ORAL at 22:14

## 2018-04-06 RX ADMIN — AMLODIPINE BESYLATE 5 MILLIGRAM(S): 2.5 TABLET ORAL at 22:15

## 2018-04-06 RX ADMIN — Medication 125 MILLIGRAM(S): at 21:35

## 2018-04-06 RX ADMIN — NYSTATIN CREAM 1 APPLICATION(S): 100000 CREAM TOPICAL at 17:54

## 2018-04-06 RX ADMIN — Medication 3 UNIT(S): at 12:22

## 2018-04-06 RX ADMIN — TACROLIMUS 0.5 MILLIGRAM(S): 5 CAPSULE ORAL at 22:15

## 2018-04-06 RX ADMIN — Medication 3 MILLILITER(S): at 20:07

## 2018-04-07 LAB
INR BLD: 3.48 RATIO — HIGH (ref 0.65–1.3)
PROTHROM AB SERPL-ACNC: 38.5 SEC — HIGH (ref 9.95–12.87)

## 2018-04-07 RX ORDER — WARFARIN SODIUM 2.5 MG/1
1 TABLET ORAL AT BEDTIME
Qty: 0 | Refills: 0 | Status: COMPLETED | OUTPATIENT
Start: 2018-04-07 | End: 2018-04-07

## 2018-04-07 RX ADMIN — NYSTATIN CREAM 1 APPLICATION(S): 100000 CREAM TOPICAL at 06:06

## 2018-04-07 RX ADMIN — Medication 25 MILLIGRAM(S): at 17:02

## 2018-04-07 RX ADMIN — NYSTATIN CREAM 1 APPLICATION(S): 100000 CREAM TOPICAL at 17:03

## 2018-04-07 RX ADMIN — Medication 3 UNIT(S): at 12:57

## 2018-04-07 RX ADMIN — Medication 3 MILLILITER(S): at 20:16

## 2018-04-07 RX ADMIN — PANTOPRAZOLE SODIUM 40 MILLIGRAM(S): 20 TABLET, DELAYED RELEASE ORAL at 09:09

## 2018-04-07 RX ADMIN — INSULIN GLARGINE 9 UNIT(S): 100 INJECTION, SOLUTION SUBCUTANEOUS at 22:02

## 2018-04-07 RX ADMIN — Medication 125 MILLIGRAM(S): at 06:07

## 2018-04-07 RX ADMIN — Medication 3 UNIT(S): at 12:53

## 2018-04-07 RX ADMIN — Medication 1 SUPPOSITORY(S): at 22:02

## 2018-04-07 RX ADMIN — Medication 3 UNIT(S): at 16:59

## 2018-04-07 RX ADMIN — Medication 3 MILLILITER(S): at 09:27

## 2018-04-07 RX ADMIN — Medication 1 APPLICATION(S): at 12:59

## 2018-04-07 RX ADMIN — Medication 25 MILLIGRAM(S): at 06:06

## 2018-04-07 RX ADMIN — AMLODIPINE BESYLATE 5 MILLIGRAM(S): 2.5 TABLET ORAL at 23:25

## 2018-04-07 RX ADMIN — Medication 3 MILLILITER(S): at 14:10

## 2018-04-07 RX ADMIN — TACROLIMUS 0.5 MILLIGRAM(S): 5 CAPSULE ORAL at 21:46

## 2018-04-07 RX ADMIN — WARFARIN SODIUM 1 MILLIGRAM(S): 2.5 TABLET ORAL at 21:47

## 2018-04-07 RX ADMIN — MONTELUKAST 10 MILLIGRAM(S): 4 TABLET, CHEWABLE ORAL at 21:46

## 2018-04-07 RX ADMIN — Medication 125 MILLIGRAM(S): at 13:00

## 2018-04-07 RX ADMIN — Medication 125 MILLIGRAM(S): at 02:14

## 2018-04-07 RX ADMIN — Medication 40 MILLIGRAM(S): at 06:06

## 2018-04-07 RX ADMIN — Medication 125 MILLIGRAM(S): at 23:25

## 2018-04-08 RX ADMIN — NYSTATIN CREAM 1 APPLICATION(S): 100000 CREAM TOPICAL at 06:08

## 2018-04-08 RX ADMIN — TACROLIMUS 0.5 MILLIGRAM(S): 5 CAPSULE ORAL at 22:42

## 2018-04-08 RX ADMIN — Medication 1 APPLICATION(S): at 12:03

## 2018-04-08 RX ADMIN — Medication 125 MILLIGRAM(S): at 12:04

## 2018-04-08 RX ADMIN — Medication 25 MILLIGRAM(S): at 20:01

## 2018-04-08 RX ADMIN — Medication 3 UNIT(S): at 08:31

## 2018-04-08 RX ADMIN — Medication 3 UNIT(S): at 16:43

## 2018-04-08 RX ADMIN — Medication 125 MILLIGRAM(S): at 20:02

## 2018-04-08 RX ADMIN — Medication 40 MILLIGRAM(S): at 06:07

## 2018-04-08 RX ADMIN — NYSTATIN CREAM 1 APPLICATION(S): 100000 CREAM TOPICAL at 19:59

## 2018-04-08 RX ADMIN — INSULIN GLARGINE 9 UNIT(S): 100 INJECTION, SOLUTION SUBCUTANEOUS at 22:42

## 2018-04-08 RX ADMIN — PANTOPRAZOLE SODIUM 40 MILLIGRAM(S): 20 TABLET, DELAYED RELEASE ORAL at 08:31

## 2018-04-08 RX ADMIN — Medication 3 UNIT(S): at 11:58

## 2018-04-08 RX ADMIN — Medication 125 MILLIGRAM(S): at 06:07

## 2018-04-08 RX ADMIN — Medication 3 MILLILITER(S): at 14:25

## 2018-04-08 RX ADMIN — NYSTATIN CREAM 1 APPLICATION(S): 100000 CREAM TOPICAL at 19:58

## 2018-04-08 RX ADMIN — Medication 125 MILLIGRAM(S): at 12:03

## 2018-04-08 RX ADMIN — Medication 25 MILLIGRAM(S): at 06:07

## 2018-04-08 RX ADMIN — MONTELUKAST 10 MILLIGRAM(S): 4 TABLET, CHEWABLE ORAL at 22:42

## 2018-04-09 LAB
ALBUMIN SERPL ELPH-MCNC: 3.3 G/DL — LOW (ref 3.5–5.2)
ALP SERPL-CCNC: 127 U/L — HIGH (ref 30–115)
ALT FLD-CCNC: 26 U/L — SIGNIFICANT CHANGE UP (ref 0–41)
ANION GAP SERPL CALC-SCNC: 15 MMOL/L — HIGH (ref 7–14)
AST SERPL-CCNC: 15 U/L — SIGNIFICANT CHANGE UP (ref 0–41)
BILIRUB SERPL-MCNC: 0.5 MG/DL — SIGNIFICANT CHANGE UP (ref 0.2–1.2)
BUN SERPL-MCNC: 19 MG/DL — SIGNIFICANT CHANGE UP (ref 10–20)
CALCIUM SERPL-MCNC: 8.7 MG/DL — SIGNIFICANT CHANGE UP (ref 8.5–10.1)
CHLORIDE SERPL-SCNC: 103 MMOL/L — SIGNIFICANT CHANGE UP (ref 98–110)
CO2 SERPL-SCNC: 26 MMOL/L — SIGNIFICANT CHANGE UP (ref 17–32)
CREAT SERPL-MCNC: 1 MG/DL — SIGNIFICANT CHANGE UP (ref 0.7–1.5)
GLUCOSE SERPL-MCNC: 207 MG/DL — HIGH (ref 70–99)
INR BLD: 2.95 RATIO — HIGH (ref 0.65–1.3)
POTASSIUM SERPL-MCNC: 4.3 MMOL/L — SIGNIFICANT CHANGE UP (ref 3.5–5)
POTASSIUM SERPL-SCNC: 4.3 MMOL/L — SIGNIFICANT CHANGE UP (ref 3.5–5)
PROT SERPL-MCNC: 5.9 G/DL — LOW (ref 6–8)
PROTHROM AB SERPL-ACNC: 32.6 SEC — HIGH (ref 9.95–12.87)
SODIUM SERPL-SCNC: 144 MMOL/L — SIGNIFICANT CHANGE UP (ref 135–146)

## 2018-04-09 RX ORDER — WARFARIN SODIUM 2.5 MG/1
1 TABLET ORAL
Qty: 48 | Refills: 0 | OUTPATIENT
Start: 2018-04-09

## 2018-04-09 RX ORDER — WARFARIN SODIUM 2.5 MG/1
1 TABLET ORAL AT BEDTIME
Qty: 0 | Refills: 0 | Status: COMPLETED | OUTPATIENT
Start: 2018-04-09 | End: 2018-04-09

## 2018-04-09 RX ORDER — ACETAMINOPHEN 500 MG
2 TABLET ORAL
Qty: 0 | Refills: 0 | COMMUNITY
Start: 2018-04-09

## 2018-04-09 RX ADMIN — Medication 125 MILLIGRAM(S): at 00:15

## 2018-04-09 RX ADMIN — Medication 3 UNIT(S): at 08:20

## 2018-04-09 RX ADMIN — NYSTATIN CREAM 1 APPLICATION(S): 100000 CREAM TOPICAL at 06:07

## 2018-04-09 RX ADMIN — Medication 3 UNIT(S): at 17:28

## 2018-04-09 RX ADMIN — Medication 1 APPLICATION(S): at 12:42

## 2018-04-09 RX ADMIN — NYSTATIN CREAM 1 APPLICATION(S): 100000 CREAM TOPICAL at 17:30

## 2018-04-09 RX ADMIN — Medication 125 MILLIGRAM(S): at 06:07

## 2018-04-09 RX ADMIN — Medication 125 MILLIGRAM(S): at 23:03

## 2018-04-09 RX ADMIN — Medication 3 MILLILITER(S): at 13:23

## 2018-04-09 RX ADMIN — AMLODIPINE BESYLATE 5 MILLIGRAM(S): 2.5 TABLET ORAL at 06:06

## 2018-04-09 RX ADMIN — Medication 3 UNIT(S): at 12:39

## 2018-04-09 RX ADMIN — Medication 25 MILLIGRAM(S): at 06:06

## 2018-04-09 RX ADMIN — Medication 40 MILLIGRAM(S): at 06:06

## 2018-04-09 RX ADMIN — Medication 3 MILLILITER(S): at 07:18

## 2018-04-09 RX ADMIN — Medication 25 MILLIGRAM(S): at 17:29

## 2018-04-09 RX ADMIN — Medication 1 SUPPOSITORY(S): at 06:06

## 2018-04-09 RX ADMIN — Medication 125 MILLIGRAM(S): at 17:30

## 2018-04-09 RX ADMIN — WARFARIN SODIUM 1 MILLIGRAM(S): 2.5 TABLET ORAL at 21:53

## 2018-04-09 RX ADMIN — MONTELUKAST 10 MILLIGRAM(S): 4 TABLET, CHEWABLE ORAL at 21:53

## 2018-04-09 RX ADMIN — TACROLIMUS 0.5 MILLIGRAM(S): 5 CAPSULE ORAL at 21:53

## 2018-04-09 RX ADMIN — Medication 125 MILLIGRAM(S): at 12:39

## 2018-04-09 RX ADMIN — INSULIN GLARGINE 9 UNIT(S): 100 INJECTION, SOLUTION SUBCUTANEOUS at 21:53

## 2018-04-09 RX ADMIN — PANTOPRAZOLE SODIUM 40 MILLIGRAM(S): 20 TABLET, DELAYED RELEASE ORAL at 06:06

## 2018-04-10 LAB
ALBUMIN SERPL ELPH-MCNC: 3.1 G/DL — LOW (ref 3.5–5.2)
ALP SERPL-CCNC: 121 U/L — HIGH (ref 30–115)
ALT FLD-CCNC: 24 U/L — SIGNIFICANT CHANGE UP (ref 0–41)
ANION GAP SERPL CALC-SCNC: 13 MMOL/L — SIGNIFICANT CHANGE UP (ref 7–14)
AST SERPL-CCNC: 15 U/L — SIGNIFICANT CHANGE UP (ref 0–41)
BILIRUB SERPL-MCNC: 0.4 MG/DL — SIGNIFICANT CHANGE UP (ref 0.2–1.2)
BUN SERPL-MCNC: 21 MG/DL — HIGH (ref 10–20)
CALCIUM SERPL-MCNC: 8.4 MG/DL — LOW (ref 8.5–10.1)
CHLORIDE SERPL-SCNC: 105 MMOL/L — SIGNIFICANT CHANGE UP (ref 98–110)
CO2 SERPL-SCNC: 28 MMOL/L — SIGNIFICANT CHANGE UP (ref 17–32)
CREAT SERPL-MCNC: 0.9 MG/DL — SIGNIFICANT CHANGE UP (ref 0.7–1.5)
GLUCOSE SERPL-MCNC: 77 MG/DL — SIGNIFICANT CHANGE UP (ref 70–99)
INR BLD: 2.26 RATIO — HIGH (ref 0.65–1.3)
POTASSIUM SERPL-MCNC: 4.7 MMOL/L — SIGNIFICANT CHANGE UP (ref 3.5–5)
POTASSIUM SERPL-SCNC: 4.7 MMOL/L — SIGNIFICANT CHANGE UP (ref 3.5–5)
PROT SERPL-MCNC: 5.6 G/DL — LOW (ref 6–8)
PROTHROM AB SERPL-ACNC: 24.8 SEC — HIGH (ref 9.95–12.87)
SODIUM SERPL-SCNC: 146 MMOL/L — SIGNIFICANT CHANGE UP (ref 135–146)
TYPE + AB SCN PNL BLD: SIGNIFICANT CHANGE UP

## 2018-04-10 RX ORDER — INSULIN LISPRO 100/ML
3 VIAL (ML) SUBCUTANEOUS
Qty: 2 | Refills: 0 | OUTPATIENT
Start: 2018-04-10 | End: 2018-05-09

## 2018-04-10 RX ORDER — NYSTATIN CREAM 100000 [USP'U]/G
1 CREAM TOPICAL
Qty: 2 | Refills: 1 | OUTPATIENT
Start: 2018-04-10

## 2018-04-10 RX ORDER — BUDESONIDE, MICRONIZED 100 %
1 POWDER (GRAM) MISCELLANEOUS
Qty: 0 | Refills: 0 | COMMUNITY

## 2018-04-10 RX ORDER — VANCOMYCIN HCL 1 G
1 VIAL (EA) INTRAVENOUS
Qty: 12 | Refills: 0 | OUTPATIENT
Start: 2018-04-10 | End: 2018-04-12

## 2018-04-10 RX ORDER — HYDROCORTISONE 1 %
1 OINTMENT (GRAM) TOPICAL
Qty: 30 | Refills: 0 | OUTPATIENT
Start: 2018-04-10 | End: 2018-05-09

## 2018-04-10 RX ORDER — BACITRACIN ZINC 500 UNIT/G
1 OINTMENT IN PACKET (EA) TOPICAL
Qty: 28.4 | Refills: 1 | OUTPATIENT
Start: 2018-04-10

## 2018-04-10 RX ORDER — ENOXAPARIN SODIUM 100 MG/ML
9 INJECTION SUBCUTANEOUS
Qty: 6 | Refills: 0 | OUTPATIENT
Start: 2018-04-10 | End: 2018-05-09

## 2018-04-10 RX ADMIN — NYSTATIN CREAM 1 APPLICATION(S): 100000 CREAM TOPICAL at 05:46

## 2018-04-10 RX ADMIN — NYSTATIN CREAM 1 APPLICATION(S): 100000 CREAM TOPICAL at 05:47

## 2018-04-10 RX ADMIN — AMLODIPINE BESYLATE 5 MILLIGRAM(S): 2.5 TABLET ORAL at 05:46

## 2018-04-10 RX ADMIN — Medication 125 MILLIGRAM(S): at 05:46

## 2018-04-10 RX ADMIN — Medication 3 MILLILITER(S): at 08:35

## 2018-04-10 RX ADMIN — Medication 40 MILLIGRAM(S): at 05:46

## 2018-04-10 RX ADMIN — Medication 3 UNIT(S): at 12:24

## 2018-04-10 RX ADMIN — Medication 1 APPLICATION(S): at 12:26

## 2018-04-10 RX ADMIN — Medication 125 MILLIGRAM(S): at 13:38

## 2018-04-10 RX ADMIN — PANTOPRAZOLE SODIUM 40 MILLIGRAM(S): 20 TABLET, DELAYED RELEASE ORAL at 05:47

## 2018-04-10 RX ADMIN — Medication 25 MILLIGRAM(S): at 05:46

## 2018-04-12 ENCOUNTER — OUTPATIENT (OUTPATIENT)
Dept: OUTPATIENT SERVICES | Facility: HOSPITAL | Age: 73
LOS: 1 days | Discharge: HOME | End: 2018-04-12

## 2018-04-12 DIAGNOSIS — I10 ESSENTIAL (PRIMARY) HYPERTENSION: ICD-10-CM

## 2018-04-12 DIAGNOSIS — K75.4 AUTOIMMUNE HEPATITIS: ICD-10-CM

## 2018-04-12 DIAGNOSIS — I27.82 CHRONIC PULMONARY EMBOLISM: ICD-10-CM

## 2018-04-12 DIAGNOSIS — Z51.89 ENCOUNTER FOR OTHER SPECIFIED AFTERCARE: ICD-10-CM

## 2018-04-12 DIAGNOSIS — B36.8 OTHER SPECIFIED SUPERFICIAL MYCOSES: ICD-10-CM

## 2018-04-12 DIAGNOSIS — G72.0 DRUG-INDUCED MYOPATHY: ICD-10-CM

## 2018-04-12 DIAGNOSIS — J45.909 UNSPECIFIED ASTHMA, UNCOMPLICATED: ICD-10-CM

## 2018-04-12 DIAGNOSIS — D68.52 PROTHROMBIN GENE MUTATION: ICD-10-CM

## 2018-04-12 DIAGNOSIS — Z79.01 LONG TERM (CURRENT) USE OF ANTICOAGULANTS: ICD-10-CM

## 2018-04-12 DIAGNOSIS — T38.0X5D ADVERSE EFFECT OF GLUCOCORTICOIDS AND SYNTHETIC ANALOGUES, SUBSEQUENT ENCOUNTER: ICD-10-CM

## 2018-04-12 DIAGNOSIS — G72.81 CRITICAL ILLNESS MYOPATHY: ICD-10-CM

## 2018-04-12 DIAGNOSIS — E11.9 TYPE 2 DIABETES MELLITUS WITHOUT COMPLICATIONS: ICD-10-CM

## 2018-04-12 DIAGNOSIS — K64.9 UNSPECIFIED HEMORRHOIDS: ICD-10-CM

## 2018-04-12 DIAGNOSIS — A04.72 ENTEROCOLITIS DUE TO CLOSTRIDIUM DIFFICILE, NOT SPECIFIED AS RECURRENT: ICD-10-CM

## 2018-04-12 DIAGNOSIS — D61.818 OTHER PANCYTOPENIA: ICD-10-CM

## 2018-04-12 DIAGNOSIS — D68.51 ACTIVATED PROTEIN C RESISTANCE: ICD-10-CM

## 2018-04-12 DIAGNOSIS — Z79.4 LONG TERM (CURRENT) USE OF INSULIN: ICD-10-CM

## 2018-04-13 DIAGNOSIS — A49.02 METHICILLIN RESISTANT STAPHYLOCOCCUS AUREUS INFECTION, UNSPECIFIED SITE: ICD-10-CM

## 2018-04-13 DIAGNOSIS — E09.9 DRUG OR CHEMICAL INDUCED DIABETES MELLITUS WITHOUT COMPLICATIONS: ICD-10-CM

## 2018-04-13 DIAGNOSIS — G72.0 DRUG-INDUCED MYOPATHY: ICD-10-CM

## 2018-04-16 ENCOUNTER — OUTPATIENT (OUTPATIENT)
Dept: OUTPATIENT SERVICES | Facility: HOSPITAL | Age: 73
LOS: 1 days | Discharge: HOME | End: 2018-04-16

## 2018-04-16 DIAGNOSIS — I27.82 CHRONIC PULMONARY EMBOLISM: ICD-10-CM

## 2018-04-16 DIAGNOSIS — Z79.01 LONG TERM (CURRENT) USE OF ANTICOAGULANTS: ICD-10-CM

## 2018-04-19 ENCOUNTER — APPOINTMENT (OUTPATIENT)
Age: 73
End: 2018-04-19

## 2018-04-19 ENCOUNTER — OUTPATIENT (OUTPATIENT)
Dept: OUTPATIENT SERVICES | Facility: HOSPITAL | Age: 73
LOS: 1 days | Discharge: HOME | End: 2018-04-19

## 2018-04-23 ENCOUNTER — OUTPATIENT (OUTPATIENT)
Dept: OUTPATIENT SERVICES | Facility: HOSPITAL | Age: 73
LOS: 1 days | Discharge: HOME | End: 2018-04-23

## 2018-04-23 ENCOUNTER — TRANSCRIPTION ENCOUNTER (OUTPATIENT)
Age: 73
End: 2018-04-23

## 2018-04-23 DIAGNOSIS — Z79.01 LONG TERM (CURRENT) USE OF ANTICOAGULANTS: ICD-10-CM

## 2018-04-23 DIAGNOSIS — I27.82 CHRONIC PULMONARY EMBOLISM: ICD-10-CM

## 2018-04-23 DIAGNOSIS — I26.99 OTHER PULMONARY EMBOLISM WITHOUT ACUTE COR PULMONALE: ICD-10-CM

## 2018-04-25 DIAGNOSIS — I26.99 OTHER PULMONARY EMBOLISM WITHOUT ACUTE COR PULMONALE: ICD-10-CM

## 2018-04-25 DIAGNOSIS — Z02.9 ENCOUNTER FOR ADMINISTRATIVE EXAMINATIONS, UNSPECIFIED: ICD-10-CM

## 2018-04-28 LAB
CULTURE RESULTS: SIGNIFICANT CHANGE UP
SPECIMEN SOURCE: SIGNIFICANT CHANGE UP

## 2018-04-30 ENCOUNTER — OUTPATIENT (OUTPATIENT)
Dept: OUTPATIENT SERVICES | Facility: HOSPITAL | Age: 73
LOS: 1 days | Discharge: HOME | End: 2018-04-30

## 2018-04-30 DIAGNOSIS — S81.802A UNSPECIFIED OPEN WOUND, LEFT LOWER LEG, INITIAL ENCOUNTER: ICD-10-CM
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
## 2018-04-30 DIAGNOSIS — I26.99 OTHER PULMONARY EMBOLISM WITHOUT ACUTE COR PULMONALE: ICD-10-CM

## 2018-05-03 ENCOUNTER — APPOINTMENT (OUTPATIENT)
Dept: BURN CARE | Facility: CLINIC | Age: 73
End: 2018-05-03

## 2018-05-03 ENCOUNTER — OUTPATIENT (OUTPATIENT)
Dept: OUTPATIENT SERVICES | Facility: HOSPITAL | Age: 73
LOS: 1 days | Discharge: HOME | End: 2018-05-03

## 2018-05-04 ENCOUNTER — OUTPATIENT (OUTPATIENT)
Dept: OUTPATIENT SERVICES | Facility: HOSPITAL | Age: 73
LOS: 1 days | Discharge: HOME | End: 2018-05-04

## 2018-05-04 DIAGNOSIS — I27.82 CHRONIC PULMONARY EMBOLISM: ICD-10-CM

## 2018-05-04 DIAGNOSIS — Z79.01 LONG TERM (CURRENT) USE OF ANTICOAGULANTS: ICD-10-CM

## 2018-05-15 DIAGNOSIS — G89.11 ACUTE PAIN DUE TO TRAUMA: ICD-10-CM
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
## 2018-05-15 DIAGNOSIS — S81.802A UNSPECIFIED OPEN WOUND, LEFT LOWER LEG, INITIAL ENCOUNTER: ICD-10-CM
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
## 2018-05-17 ENCOUNTER — OUTPATIENT (OUTPATIENT)
Dept: OUTPATIENT SERVICES | Facility: HOSPITAL | Age: 73
LOS: 1 days | Discharge: HOME | End: 2018-05-17

## 2018-05-17 ENCOUNTER — APPOINTMENT (OUTPATIENT)
Dept: BURN CARE | Facility: CLINIC | Age: 73
End: 2018-05-17

## 2018-05-18 ENCOUNTER — OUTPATIENT (OUTPATIENT)
Dept: OUTPATIENT SERVICES | Facility: HOSPITAL | Age: 73
LOS: 1 days | Discharge: HOME | End: 2018-05-18

## 2018-05-18 ENCOUNTER — APPOINTMENT (OUTPATIENT)
Dept: VASCULAR SURGERY | Facility: CLINIC | Age: 73
End: 2018-05-18
Payer: MEDICARE

## 2018-05-18 VITALS
SYSTOLIC BLOOD PRESSURE: 120 MMHG | HEIGHT: 63 IN | WEIGHT: 169 LBS | DIASTOLIC BLOOD PRESSURE: 80 MMHG | BODY MASS INDEX: 29.95 KG/M2

## 2018-05-18 DIAGNOSIS — I27.82 CHRONIC PULMONARY EMBOLISM: ICD-10-CM

## 2018-05-18 DIAGNOSIS — Z79.01 LONG TERM (CURRENT) USE OF ANTICOAGULANTS: ICD-10-CM

## 2018-05-18 PROCEDURE — 99203 OFFICE O/P NEW LOW 30 MIN: CPT

## 2018-05-18 PROCEDURE — 93970 EXTREMITY STUDY: CPT

## 2018-05-18 PROCEDURE — 29580 STRAPPING UNNA BOOT: CPT | Mod: RT

## 2018-05-23 DIAGNOSIS — S81.801A UNSPECIFIED OPEN WOUND, RIGHT LOWER LEG, INITIAL ENCOUNTER: ICD-10-CM
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
## 2018-05-25 ENCOUNTER — APPOINTMENT (OUTPATIENT)
Dept: VASCULAR SURGERY | Facility: CLINIC | Age: 73
End: 2018-05-25
Payer: MEDICARE

## 2018-05-25 ENCOUNTER — OUTPATIENT (OUTPATIENT)
Dept: OUTPATIENT SERVICES | Facility: HOSPITAL | Age: 73
LOS: 1 days | Discharge: HOME | End: 2018-05-25

## 2018-05-25 DIAGNOSIS — Z79.01 LONG TERM (CURRENT) USE OF ANTICOAGULANTS: ICD-10-CM

## 2018-05-25 DIAGNOSIS — I27.82 CHRONIC PULMONARY EMBOLISM: ICD-10-CM

## 2018-05-25 PROCEDURE — 29580 STRAPPING UNNA BOOT: CPT | Mod: RT

## 2018-06-01 ENCOUNTER — APPOINTMENT (OUTPATIENT)
Dept: VASCULAR SURGERY | Facility: CLINIC | Age: 73
End: 2018-06-01
Payer: MEDICARE

## 2018-06-01 ENCOUNTER — OUTPATIENT (OUTPATIENT)
Dept: OUTPATIENT SERVICES | Facility: HOSPITAL | Age: 73
LOS: 1 days | Discharge: HOME | End: 2018-06-01

## 2018-06-01 DIAGNOSIS — Z79.01 LONG TERM (CURRENT) USE OF ANTICOAGULANTS: ICD-10-CM

## 2018-06-01 DIAGNOSIS — I27.82 CHRONIC PULMONARY EMBOLISM: ICD-10-CM

## 2018-06-01 PROCEDURE — 29580 STRAPPING UNNA BOOT: CPT | Mod: RT

## 2018-06-01 RX ORDER — SILVER SULFADIAZINE 10 MG/G
1 CREAM TOPICAL DAILY
Qty: 400 | Refills: 3 | Status: ACTIVE | COMMUNITY
Start: 2018-06-01 | End: 1900-01-01

## 2018-06-07 ENCOUNTER — APPOINTMENT (OUTPATIENT)
Dept: WOUND CARE | Facility: CLINIC | Age: 73
End: 2018-06-07

## 2018-06-15 ENCOUNTER — OUTPATIENT (OUTPATIENT)
Dept: OUTPATIENT SERVICES | Facility: HOSPITAL | Age: 73
LOS: 1 days | Discharge: HOME | End: 2018-06-15

## 2018-06-15 DIAGNOSIS — Z79.01 LONG TERM (CURRENT) USE OF ANTICOAGULANTS: ICD-10-CM

## 2018-06-15 DIAGNOSIS — I27.82 CHRONIC PULMONARY EMBOLISM: ICD-10-CM

## 2018-07-05 ENCOUNTER — APPOINTMENT (OUTPATIENT)
Dept: HEMATOLOGY ONCOLOGY | Facility: CLINIC | Age: 73
End: 2018-07-05

## 2018-07-05 ENCOUNTER — OUTPATIENT (OUTPATIENT)
Dept: OUTPATIENT SERVICES | Facility: HOSPITAL | Age: 73
LOS: 1 days | Discharge: HOME | End: 2018-07-05

## 2018-07-05 VITALS
HEIGHT: 63 IN | HEART RATE: 82 BPM | BODY MASS INDEX: 30.12 KG/M2 | DIASTOLIC BLOOD PRESSURE: 75 MMHG | TEMPERATURE: 96.4 F | WEIGHT: 170 LBS | SYSTOLIC BLOOD PRESSURE: 167 MMHG

## 2018-07-06 ENCOUNTER — OUTPATIENT (OUTPATIENT)
Dept: OUTPATIENT SERVICES | Facility: HOSPITAL | Age: 73
LOS: 1 days | Discharge: HOME | End: 2018-07-06

## 2018-07-06 DIAGNOSIS — D68.69 OTHER THROMBOPHILIA: ICD-10-CM

## 2018-07-06 DIAGNOSIS — R91.8 OTHER NONSPECIFIC ABNORMAL FINDING OF LUNG FIELD: ICD-10-CM

## 2018-07-09 ENCOUNTER — OUTPATIENT (OUTPATIENT)
Dept: OUTPATIENT SERVICES | Facility: HOSPITAL | Age: 73
LOS: 1 days | Discharge: HOME | End: 2018-07-09

## 2018-07-09 DIAGNOSIS — I27.82 CHRONIC PULMONARY EMBOLISM: ICD-10-CM

## 2018-07-09 DIAGNOSIS — Z79.01 LONG TERM (CURRENT) USE OF ANTICOAGULANTS: ICD-10-CM

## 2018-07-13 ENCOUNTER — APPOINTMENT (OUTPATIENT)
Dept: VASCULAR SURGERY | Facility: CLINIC | Age: 73
End: 2018-07-13
Payer: MEDICARE

## 2018-07-13 VITALS — BODY MASS INDEX: 28.51 KG/M2 | WEIGHT: 167 LBS | HEIGHT: 64 IN

## 2018-07-13 PROCEDURE — 29580 STRAPPING UNNA BOOT: CPT | Mod: RT

## 2018-07-13 PROCEDURE — 93970 EXTREMITY STUDY: CPT

## 2018-07-25 ENCOUNTER — OUTPATIENT (OUTPATIENT)
Dept: OUTPATIENT SERVICES | Facility: HOSPITAL | Age: 73
LOS: 1 days | Discharge: HOME | End: 2018-07-25

## 2018-07-25 DIAGNOSIS — Z79.01 LONG TERM (CURRENT) USE OF ANTICOAGULANTS: ICD-10-CM

## 2018-07-25 DIAGNOSIS — I27.82 CHRONIC PULMONARY EMBOLISM: ICD-10-CM

## 2018-07-25 PROBLEM — I82.409 ACUTE EMBOLISM AND THROMBOSIS OF UNSPECIFIED DEEP VEINS OF UNSPECIFIED LOWER EXTREMITY: Chronic | Status: ACTIVE | Noted: 2018-02-19

## 2018-08-08 ENCOUNTER — OUTPATIENT (OUTPATIENT)
Dept: OUTPATIENT SERVICES | Facility: HOSPITAL | Age: 73
LOS: 1 days | Discharge: HOME | End: 2018-08-08

## 2018-08-08 DIAGNOSIS — I27.82 CHRONIC PULMONARY EMBOLISM: ICD-10-CM

## 2018-08-08 DIAGNOSIS — Z79.01 LONG TERM (CURRENT) USE OF ANTICOAGULANTS: ICD-10-CM

## 2018-08-08 RX ORDER — CEPHALEXIN 500 MG/1
500 CAPSULE ORAL 3 TIMES DAILY
Qty: 30 | Refills: 0 | Status: ACTIVE | COMMUNITY
Start: 2018-08-08 | End: 1900-01-01

## 2018-08-12 ENCOUNTER — INPATIENT (INPATIENT)
Facility: HOSPITAL | Age: 73
LOS: 4 days | Discharge: HOME HEALTH | End: 2018-08-17
Attending: INTERNAL MEDICINE | Admitting: INTERNAL MEDICINE
Payer: MEDICARE

## 2018-08-12 VITALS
TEMPERATURE: 97 F | DIASTOLIC BLOOD PRESSURE: 86 MMHG | HEART RATE: 98 BPM | HEIGHT: 64 IN | SYSTOLIC BLOOD PRESSURE: 195 MMHG | WEIGHT: 166.89 LBS | OXYGEN SATURATION: 97 % | RESPIRATION RATE: 18 BRPM

## 2018-08-12 DIAGNOSIS — E11.9 TYPE 2 DIABETES MELLITUS WITHOUT COMPLICATIONS: ICD-10-CM

## 2018-08-12 DIAGNOSIS — Z86.718 PERSONAL HISTORY OF OTHER VENOUS THROMBOSIS AND EMBOLISM: ICD-10-CM

## 2018-08-12 DIAGNOSIS — D68.52 PROTHROMBIN GENE MUTATION: ICD-10-CM

## 2018-08-12 DIAGNOSIS — Z86.711 PERSONAL HISTORY OF PULMONARY EMBOLISM: ICD-10-CM

## 2018-08-12 DIAGNOSIS — Z79.2 LONG TERM (CURRENT) USE OF ANTIBIOTICS: ICD-10-CM

## 2018-08-12 DIAGNOSIS — R74.0 NONSPECIFIC ELEVATION OF LEVELS OF TRANSAMINASE AND LACTIC ACID DEHYDROGENASE [LDH]: ICD-10-CM

## 2018-08-12 DIAGNOSIS — Z86.19 PERSONAL HISTORY OF OTHER INFECTIOUS AND PARASITIC DISEASES: ICD-10-CM

## 2018-08-12 DIAGNOSIS — R21 RASH AND OTHER NONSPECIFIC SKIN ERUPTION: ICD-10-CM

## 2018-08-12 DIAGNOSIS — I10 ESSENTIAL (PRIMARY) HYPERTENSION: ICD-10-CM

## 2018-08-12 DIAGNOSIS — D72.829 ELEVATED WHITE BLOOD CELL COUNT, UNSPECIFIED: ICD-10-CM

## 2018-08-12 DIAGNOSIS — I82.502 CHRONIC EMBOLISM AND THROMBOSIS OF UNSPECIFIED DEEP VEINS OF LEFT LOWER EXTREMITY: ICD-10-CM

## 2018-08-12 DIAGNOSIS — Z79.4 LONG TERM (CURRENT) USE OF INSULIN: ICD-10-CM

## 2018-08-12 DIAGNOSIS — Z79.01 LONG TERM (CURRENT) USE OF ANTICOAGULANTS: ICD-10-CM

## 2018-08-12 DIAGNOSIS — M79.662 PAIN IN LEFT LOWER LEG: ICD-10-CM

## 2018-08-12 DIAGNOSIS — L82.1 OTHER SEBORRHEIC KERATOSIS: ICD-10-CM

## 2018-08-12 DIAGNOSIS — I96 GANGRENE, NOT ELSEWHERE CLASSIFIED: ICD-10-CM

## 2018-08-12 DIAGNOSIS — L72.0 EPIDERMAL CYST: ICD-10-CM

## 2018-08-12 DIAGNOSIS — L97.928 NON-PRESSURE CHRONIC ULCER OF UNSPECIFIED PART OF LEFT LOWER LEG WITH OTHER SPECIFIED SEVERITY: ICD-10-CM

## 2018-08-12 LAB
ALBUMIN SERPL ELPH-MCNC: 3.6 G/DL — SIGNIFICANT CHANGE UP (ref 3.5–5.2)
ALP SERPL-CCNC: 172 U/L — HIGH (ref 30–115)
ALT FLD-CCNC: 53 U/L — HIGH (ref 0–41)
ANION GAP SERPL CALC-SCNC: 14 MMOL/L — SIGNIFICANT CHANGE UP (ref 7–14)
APTT BLD: 25.8 SEC — LOW (ref 27–39.2)
AST SERPL-CCNC: 41 U/L — SIGNIFICANT CHANGE UP (ref 0–41)
BASE EXCESS BLDV CALC-SCNC: 3.6 MMOL/L — HIGH (ref -2–2)
BASOPHILS # BLD AUTO: 0.04 K/UL — SIGNIFICANT CHANGE UP (ref 0–0.2)
BASOPHILS NFR BLD AUTO: 0.3 % — SIGNIFICANT CHANGE UP (ref 0–1)
BILIRUB SERPL-MCNC: 0.3 MG/DL — SIGNIFICANT CHANGE UP (ref 0.2–1.2)
BUN SERPL-MCNC: 23 MG/DL — HIGH (ref 10–20)
CA-I SERPL-SCNC: 1.27 MMOL/L — SIGNIFICANT CHANGE UP (ref 1.12–1.3)
CALCIUM SERPL-MCNC: 8.5 MG/DL — SIGNIFICANT CHANGE UP (ref 8.5–10.1)
CHLORIDE SERPL-SCNC: 103 MMOL/L — SIGNIFICANT CHANGE UP (ref 98–110)
CO2 SERPL-SCNC: 26 MMOL/L — SIGNIFICANT CHANGE UP (ref 17–32)
CREAT SERPL-MCNC: 1.1 MG/DL — SIGNIFICANT CHANGE UP (ref 0.7–1.5)
EOSINOPHIL # BLD AUTO: 0.02 K/UL — SIGNIFICANT CHANGE UP (ref 0–0.7)
EOSINOPHIL NFR BLD AUTO: 0.1 % — SIGNIFICANT CHANGE UP (ref 0–8)
GAS PNL BLDV: 141 MMOL/L — SIGNIFICANT CHANGE UP (ref 136–145)
GAS PNL BLDV: SIGNIFICANT CHANGE UP
GLUCOSE SERPL-MCNC: 119 MG/DL — HIGH (ref 70–99)
HCO3 BLDV-SCNC: 29 MMOL/L — SIGNIFICANT CHANGE UP (ref 22–29)
HCT VFR BLD CALC: 41 % — SIGNIFICANT CHANGE UP (ref 37–47)
HCT VFR BLDA CALC: 46.5 % — HIGH (ref 34–44)
HGB BLD CALC-MCNC: 15.2 G/DL — SIGNIFICANT CHANGE UP (ref 14–18)
HGB BLD-MCNC: 12.8 G/DL — SIGNIFICANT CHANGE UP (ref 12–16)
HOROWITZ INDEX BLDV+IHG-RTO: 21 — SIGNIFICANT CHANGE UP
IMM GRANULOCYTES NFR BLD AUTO: 1.8 % — HIGH (ref 0.1–0.3)
INR BLD: 2.4 RATIO — HIGH (ref 0.65–1.3)
LACTATE BLDV-MCNC: 3 MMOL/L — HIGH (ref 0.5–1.6)
LYMPHOCYTES # BLD AUTO: 1.94 K/UL — SIGNIFICANT CHANGE UP (ref 1.2–3.4)
LYMPHOCYTES # BLD AUTO: 14 % — LOW (ref 20.5–51.1)
MCHC RBC-ENTMCNC: 26.1 PG — LOW (ref 27–31)
MCHC RBC-ENTMCNC: 31.2 G/DL — LOW (ref 32–37)
MCV RBC AUTO: 83.5 FL — SIGNIFICANT CHANGE UP (ref 81–99)
MONOCYTES # BLD AUTO: 0.69 K/UL — HIGH (ref 0.1–0.6)
MONOCYTES NFR BLD AUTO: 5 % — SIGNIFICANT CHANGE UP (ref 1.7–9.3)
NEUTROPHILS # BLD AUTO: 10.89 K/UL — HIGH (ref 1.4–6.5)
NEUTROPHILS NFR BLD AUTO: 78.8 % — HIGH (ref 42.2–75.2)
NRBC # BLD: 0 /100 WBCS — SIGNIFICANT CHANGE UP (ref 0–0)
PCO2 BLDV: 48 MMHG — SIGNIFICANT CHANGE UP (ref 41–51)
PH BLDV: 7.4 — SIGNIFICANT CHANGE UP (ref 7.26–7.43)
PLATELET # BLD AUTO: 319 K/UL — SIGNIFICANT CHANGE UP (ref 130–400)
PO2 BLDV: 25 MMHG — SIGNIFICANT CHANGE UP (ref 20–40)
POTASSIUM BLDV-SCNC: 4.2 MMOL/L — SIGNIFICANT CHANGE UP (ref 3.3–5.6)
POTASSIUM SERPL-MCNC: 4.4 MMOL/L — SIGNIFICANT CHANGE UP (ref 3.5–5)
POTASSIUM SERPL-SCNC: 4.4 MMOL/L — SIGNIFICANT CHANGE UP (ref 3.5–5)
PROT SERPL-MCNC: 6.1 G/DL — SIGNIFICANT CHANGE UP (ref 6–8)
PROTHROM AB SERPL-ACNC: 26.4 SEC — HIGH (ref 9.95–12.87)
RBC # BLD: 4.91 M/UL — SIGNIFICANT CHANGE UP (ref 4.2–5.4)
RBC # FLD: 17.2 % — HIGH (ref 11.5–14.5)
SAO2 % BLDV: 39 % — SIGNIFICANT CHANGE UP
SODIUM SERPL-SCNC: 143 MMOL/L — SIGNIFICANT CHANGE UP (ref 135–146)
WBC # BLD: 13.83 K/UL — HIGH (ref 4.8–10.8)
WBC # FLD AUTO: 13.83 K/UL — HIGH (ref 4.8–10.8)

## 2018-08-12 RX ORDER — VANCOMYCIN HCL 1 G
1000 VIAL (EA) INTRAVENOUS ONCE
Qty: 0 | Refills: 0 | Status: COMPLETED | OUTPATIENT
Start: 2018-08-12 | End: 2018-08-12

## 2018-08-12 RX ORDER — SODIUM CHLORIDE 9 MG/ML
2000 INJECTION INTRAMUSCULAR; INTRAVENOUS; SUBCUTANEOUS ONCE
Qty: 0 | Refills: 0 | Status: COMPLETED | OUTPATIENT
Start: 2018-08-12 | End: 2018-08-12

## 2018-08-12 RX ADMIN — Medication 250 MILLIGRAM(S): at 16:51

## 2018-08-12 RX ADMIN — SODIUM CHLORIDE 3428.57 MILLILITER(S): 9 INJECTION INTRAMUSCULAR; INTRAVENOUS; SUBCUTANEOUS at 20:30

## 2018-08-12 RX ADMIN — SODIUM CHLORIDE 2000 MILLILITER(S): 9 INJECTION INTRAMUSCULAR; INTRAVENOUS; SUBCUTANEOUS at 21:15

## 2018-08-12 NOTE — ED PROVIDER NOTE - OBJECTIVE STATEMENT
this is 72 yo female presents to ed for lesion left lower leg . patient states that she developed a round purple area to her left lower extremity.  patient visiting nurse started her on cipro. the lesion was black in center and opened up.  as per patient it drained blood. visiting nurse was concerned for infection and sent her in.  patient also developed purpura on left jaw. patient denies any fever or chills.

## 2018-08-12 NOTE — ED PROVIDER NOTE - NS ED ROS FT
Review of Systems:  	•	CONSTITUTIONAL - no fever, no diaphoresis, no chills  	•	SKIN - lesion left tib fib  	•	HEMATOLOGIC - no bleeding, no bruising  	•  	•	RESPIRATORY - no shortness of breath, no cough  	•	CARDIAC - no chest pain, no palpitations  	•  	•	MUSCULOSKELETAL - no joint paint, no swelling, no redness  	•	NEUROLOGIC - no weakness, no headache, no paresthesias, no LOC  	•	PSYCH - no anxiety, non suicidal, non homicidal, no hallucination, no depression

## 2018-08-12 NOTE — ED PROVIDER NOTE - PHYSICAL EXAMINATION
--EXAM--  VITAL SIGNS: I have reviewed vs documented at present.  CONSTITUTIONAL: Well-developed; well-nourished; in no acute distress.   SKIN: left leg there is a necrotic lesion to left lateral leg. no active drainage.     two other area of  purpura to left jaw    .  NECK: Supple; non tender.  CARD: S1, S2, Regular rate and rhythm.   RESP: No wheezes, rales or rhonchi.  ABD: Normal bowel sounds; soft; non-distended; non-tender.  EXT: Normal ROM.   NEURO: Alert, oriented, grossly unremarkable. Strength 5/5 in all extremities. Sensation intact throughout.  PSYCH: Cooperative, appropriate.

## 2018-08-12 NOTE — ED ADULT NURSE NOTE - NSIMPLEMENTINTERV_GEN_ALL_ED
Implemented All Universal Safety Interventions:  [place] to call system. Call bell, personal items and telephone within reach. Instruct patient to call for assistance. Room bathroom lighting operational. Non-slip footwear when patient is off stretcher. Physically safe environment: no spills, clutter or unnecessary equipment. Stretcher in lowest position, wheels locked, appropriate side rails in place.

## 2018-08-12 NOTE — ED PROVIDER NOTE - CARE PLAN
Principal Discharge DX:	Necrosis of skin or subcutaneous tissue Principal Discharge DX:	Necrosis of skin or subcutaneous tissue  Secondary Diagnosis:	Sepsis
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
## 2018-08-13 DIAGNOSIS — I80.10 PHLEBITIS AND THROMBOPHLEBITIS OF UNSPECIFIED FEMORAL VEIN: ICD-10-CM

## 2018-08-13 DIAGNOSIS — I26.99 OTHER PULMONARY EMBOLISM WITHOUT ACUTE COR PULMONALE: ICD-10-CM

## 2018-08-13 LAB
ALBUMIN SERPL ELPH-MCNC: 3 G/DL — LOW (ref 3.5–5.2)
ALP SERPL-CCNC: 135 U/L — HIGH (ref 30–115)
ALT FLD-CCNC: 43 U/L — HIGH (ref 0–41)
ANION GAP SERPL CALC-SCNC: 12 MMOL/L — SIGNIFICANT CHANGE UP (ref 7–14)
APTT BLD: 28.9 SEC — SIGNIFICANT CHANGE UP (ref 27–39.2)
AST SERPL-CCNC: 26 U/L — SIGNIFICANT CHANGE UP (ref 0–41)
BASOPHILS # BLD AUTO: 0.04 K/UL — SIGNIFICANT CHANGE UP (ref 0–0.2)
BASOPHILS NFR BLD AUTO: 0.4 % — SIGNIFICANT CHANGE UP (ref 0–1)
BILIRUB SERPL-MCNC: 0.3 MG/DL — SIGNIFICANT CHANGE UP (ref 0.2–1.2)
BUN SERPL-MCNC: 17 MG/DL — SIGNIFICANT CHANGE UP (ref 10–20)
CALCIUM SERPL-MCNC: 8.3 MG/DL — LOW (ref 8.5–10.1)
CHLORIDE SERPL-SCNC: 107 MMOL/L — SIGNIFICANT CHANGE UP (ref 98–110)
CO2 SERPL-SCNC: 27 MMOL/L — SIGNIFICANT CHANGE UP (ref 17–32)
CREAT SERPL-MCNC: 0.9 MG/DL — SIGNIFICANT CHANGE UP (ref 0.7–1.5)
EOSINOPHIL # BLD AUTO: 0.12 K/UL — SIGNIFICANT CHANGE UP (ref 0–0.7)
EOSINOPHIL NFR BLD AUTO: 1.1 % — SIGNIFICANT CHANGE UP (ref 0–8)
GLUCOSE SERPL-MCNC: 105 MG/DL — HIGH (ref 70–99)
HCT VFR BLD CALC: 38.3 % — SIGNIFICANT CHANGE UP (ref 37–47)
HGB BLD-MCNC: 11.9 G/DL — LOW (ref 12–16)
IMM GRANULOCYTES NFR BLD AUTO: 1.8 % — HIGH (ref 0.1–0.3)
INR BLD: 2.21 RATIO — HIGH (ref 0.65–1.3)
LYMPHOCYTES # BLD AUTO: 27.5 % — SIGNIFICANT CHANGE UP (ref 20.5–51.1)
LYMPHOCYTES # BLD AUTO: 3.1 K/UL — SIGNIFICANT CHANGE UP (ref 1.2–3.4)
MCHC RBC-ENTMCNC: 25.6 PG — LOW (ref 27–31)
MCHC RBC-ENTMCNC: 31.1 G/DL — LOW (ref 32–37)
MCV RBC AUTO: 82.4 FL — SIGNIFICANT CHANGE UP (ref 81–99)
MONOCYTES # BLD AUTO: 0.61 K/UL — HIGH (ref 0.1–0.6)
MONOCYTES NFR BLD AUTO: 5.4 % — SIGNIFICANT CHANGE UP (ref 1.7–9.3)
NEUTROPHILS # BLD AUTO: 7.19 K/UL — HIGH (ref 1.4–6.5)
NEUTROPHILS NFR BLD AUTO: 63.8 % — SIGNIFICANT CHANGE UP (ref 42.2–75.2)
PLATELET # BLD AUTO: 285 K/UL — SIGNIFICANT CHANGE UP (ref 130–400)
POTASSIUM SERPL-MCNC: 4.1 MMOL/L — SIGNIFICANT CHANGE UP (ref 3.5–5)
POTASSIUM SERPL-SCNC: 4.1 MMOL/L — SIGNIFICANT CHANGE UP (ref 3.5–5)
PROT SERPL-MCNC: 5.1 G/DL — LOW (ref 6–8)
PROTHROM AB SERPL-ACNC: 23.7 SEC — HIGH (ref 9.95–12.87)
RBC # BLD: 4.65 M/UL — SIGNIFICANT CHANGE UP (ref 4.2–5.4)
RBC # FLD: 17.2 % — HIGH (ref 11.5–14.5)
SODIUM SERPL-SCNC: 146 MMOL/L — SIGNIFICANT CHANGE UP (ref 135–146)
WBC # BLD: 11.26 K/UL — HIGH (ref 4.8–10.8)
WBC # FLD AUTO: 11.26 K/UL — HIGH (ref 4.8–10.8)

## 2018-08-13 PROCEDURE — 93970 EXTREMITY STUDY: CPT | Mod: 26

## 2018-08-13 RX ORDER — TIOTROPIUM BROMIDE 18 UG/1
1 CAPSULE ORAL; RESPIRATORY (INHALATION) DAILY
Qty: 0 | Refills: 0 | Status: DISCONTINUED | OUTPATIENT
Start: 2018-08-13 | End: 2018-08-14

## 2018-08-13 RX ORDER — SODIUM CHLORIDE 9 MG/ML
1000 INJECTION INTRAMUSCULAR; INTRAVENOUS; SUBCUTANEOUS
Qty: 0 | Refills: 0 | Status: DISCONTINUED | OUTPATIENT
Start: 2018-08-14 | End: 2018-08-14

## 2018-08-13 RX ORDER — FUROSEMIDE 40 MG
20 TABLET ORAL DAILY
Qty: 0 | Refills: 0 | Status: DISCONTINUED | OUTPATIENT
Start: 2018-08-13 | End: 2018-08-14

## 2018-08-13 RX ORDER — ACETAMINOPHEN 500 MG
650 TABLET ORAL ONCE
Qty: 0 | Refills: 0 | Status: COMPLETED | OUTPATIENT
Start: 2018-08-13 | End: 2018-08-13

## 2018-08-13 RX ORDER — AMLODIPINE BESYLATE 2.5 MG/1
5 TABLET ORAL DAILY
Qty: 0 | Refills: 0 | Status: DISCONTINUED | OUTPATIENT
Start: 2018-08-13 | End: 2018-08-14

## 2018-08-13 RX ORDER — MONTELUKAST 4 MG/1
10 TABLET, CHEWABLE ORAL AT BEDTIME
Qty: 0 | Refills: 0 | Status: DISCONTINUED | OUTPATIENT
Start: 2018-08-13 | End: 2018-08-14

## 2018-08-13 RX ORDER — VANCOMYCIN HCL 1 G
1000 VIAL (EA) INTRAVENOUS EVERY 12 HOURS
Qty: 0 | Refills: 0 | Status: DISCONTINUED | OUTPATIENT
Start: 2018-08-13 | End: 2018-08-14

## 2018-08-13 RX ORDER — TACROLIMUS 5 MG/1
0.5 CAPSULE ORAL
Qty: 0 | Refills: 0 | Status: DISCONTINUED | OUTPATIENT
Start: 2018-08-13 | End: 2018-08-14

## 2018-08-13 RX ORDER — BUDESONIDE, MICRONIZED 100 %
3 POWDER (GRAM) MISCELLANEOUS DAILY
Qty: 0 | Refills: 0 | Status: DISCONTINUED | OUTPATIENT
Start: 2018-08-13 | End: 2018-08-14

## 2018-08-13 RX ORDER — METOPROLOL TARTRATE 50 MG
25 TABLET ORAL
Qty: 0 | Refills: 0 | Status: DISCONTINUED | OUTPATIENT
Start: 2018-08-13 | End: 2018-08-14

## 2018-08-13 RX ORDER — PANTOPRAZOLE SODIUM 20 MG/1
40 TABLET, DELAYED RELEASE ORAL
Qty: 0 | Refills: 0 | Status: DISCONTINUED | OUTPATIENT
Start: 2018-08-13 | End: 2018-08-14

## 2018-08-13 RX ORDER — ALBUTEROL 90 UG/1
1 AEROSOL, METERED ORAL EVERY 4 HOURS
Qty: 0 | Refills: 0 | Status: DISCONTINUED | OUTPATIENT
Start: 2018-08-13 | End: 2018-08-14

## 2018-08-13 RX ADMIN — PANTOPRAZOLE SODIUM 40 MILLIGRAM(S): 20 TABLET, DELAYED RELEASE ORAL at 09:36

## 2018-08-13 RX ADMIN — Medication 20 MILLIGRAM(S): at 09:36

## 2018-08-13 RX ADMIN — Medication 10 MILLIGRAM(S): at 09:36

## 2018-08-13 RX ADMIN — Medication 250 MILLIGRAM(S): at 06:29

## 2018-08-13 RX ADMIN — Medication 650 MILLIGRAM(S): at 02:04

## 2018-08-13 RX ADMIN — AMLODIPINE BESYLATE 5 MILLIGRAM(S): 2.5 TABLET ORAL at 09:37

## 2018-08-13 RX ADMIN — MONTELUKAST 10 MILLIGRAM(S): 4 TABLET, CHEWABLE ORAL at 23:16

## 2018-08-13 RX ADMIN — TACROLIMUS 0.5 MILLIGRAM(S): 5 CAPSULE ORAL at 18:12

## 2018-08-13 RX ADMIN — TIOTROPIUM BROMIDE 1 CAPSULE(S): 18 CAPSULE ORAL; RESPIRATORY (INHALATION) at 09:37

## 2018-08-13 RX ADMIN — Medication 25 MILLIGRAM(S): at 18:12

## 2018-08-13 RX ADMIN — Medication 250 MILLIGRAM(S): at 18:12

## 2018-08-13 NOTE — H&P ADULT - NSHPPHYSICALEXAM_GEN_ALL_CORE
PHYSICAL EXAM:    T(C): 35.9 (08-12-18 @ 17:45), Max: 36.1 (08-12-18 @ 17:25)  HR: 86 (08-12-18 @ 17:45) (86 - 98)  BP: 188/88 (08-12-18 @ 17:45) (158/78 - 195/86)  RR: 18 (08-12-18 @ 17:45) (18 - 18)  SpO2: 99% (08-12-18 @ 17:45) (97% - 99%)    Constitutional:  HEENT: No oral lesions, no throat redness or swelling  Respiratory: Breath sounds  CTA b/l, no accessory muscle use  Cardiovascular: regular rate and rhytm, norml S1 S2, no murmurs  Gastrointestinal: soft non-tender no hepatosplenomegaly, normal BS  Genitourinary: no lesions, no discharge  Extremities: positive peripheral pulses no exremity edema  Neurological: AAO4 no focal deficit  Skin: no lesions or wounds  Musculoskeletal: no joint swelling or tenderness PHYSICAL EXAM:    T(C): 35.9 (08-12-18 @ 17:45), Max: 36.1 (08-12-18 @ 17:25)  HR: 86 (08-12-18 @ 17:45) (86 - 98)  BP: 188/88 (08-12-18 @ 17:45) (158/78 - 195/86)  RR: 18 (08-12-18 @ 17:45) (18 - 18)  SpO2: 99% (08-12-18 @ 17:45) (97% - 99%)    Constitutional: NAD lying comfortably in bed  HEENT: left cheek 2 cm flat ecchymosis, No oral lesions, no throat redness or swelling  Respiratory: Breath sounds  CTA b/l, no accessory muscle use  Cardiovascular: regular rate and rhythm, norml S1 S2, no murmurs  Gastrointestinal: soft non-tender no hepatosplenomegaly, normal BS  Genitourinary: no lesions, no discharge  Extremities: positive peripheral pulses bilateral non pitting edema  Neurological: AAO4 no focal motor deficit  Skin: bilateral leg ulcers covered with dressing, scattered 1-3 cm flat ecchymosis  Musculoskeletal: no joint swelling or tenderness PHYSICAL EXAM:    T(C): 35.9 (08-12-18 @ 17:45), Max: 36.1 (08-12-18 @ 17:25)  HR: 86 (08-12-18 @ 17:45) (86 - 98)  BP: 188/88 (08-12-18 @ 17:45) (158/78 - 195/86)  RR: 18 (08-12-18 @ 17:45) (18 - 18)  SpO2: 99% (08-12-18 @ 17:45) (97% - 99%)    Constitutional: NAD lying comfortably in bed  HEENT: left cheek 2 cm flat ecchymosis, No oral lesions, no throat redness or swelling  Respiratory: Breath sounds  CTA b/l, no accessory muscle use  Cardiovascular: regular rate and rhythm, normal S1 S2, no murmurs  Gastrointestinal: soft non-tender no hepatosplenomegaly, normal BS  Genitourinary: no lesions, no discharge  Extremities: positive peripheral pulses bilateral non pitting edema  Neurological: AAO4 no focal motor deficit  Skin: bilateral leg ulcers covered with dressing, scattered 1-3 cm flat ecchymosis  Musculoskeletal: no joint swelling or tenderness

## 2018-08-13 NOTE — H&P ADULT - NSHPLABSRESULTS_GEN_ALL_CORE
12.8   13.83 )-----------( 319      ( 12 Aug 2018 12:29 )             41.0       08-12    143  |  103  |  23<H>  ----------------------------<  119<H>  4.4   |  26  |  1.1    Ca    8.5      12 Aug 2018 12:29    TPro  6.1  /  Alb  3.6  /  TBili  0.3  /  DBili  x   /  AST  41  /  ALT  53<H>  /  AlkPhos  172<H>  08-12                  PT/INR - ( 12 Aug 2018 13:50 )   PT: 26.40 sec;   INR: 2.40 ratio         PTT - ( 12 Aug 2018 13:50 )  PTT:25.8 sec    Lactate Trend            CAPILLARY BLOOD GLUCOSE 12.8   13.83 )-----------( 319      ( 12 Aug 2018 12:29 )             41.0       08-12    143  |  103  |  23<H>  ----------------------------<  119<H>  4.4   |  26  |  1.1    Ca    8.5      12 Aug 2018 12:29    TPro  6.1  /  Alb  3.6  /  TBili  0.3  /  DBili  x   /  AST  41  /  ALT  53<H>  /  AlkPhos  172<H>  08-12                  PT/INR - ( 12 Aug 2018 13:50 )   PT: 26.40 sec;   INR: 2.40 ratio         PTT - ( 12 Aug 2018 13:50 )  PTT:25.8 sec    Lactate Trend    Blood Gas Venous - Lactate (08.12.18 @ 14:11)    Blood Gas Venous - Lactate: 3.0 mmoL/L          CAPILLARY BLOOD GLUCOSE

## 2018-08-13 NOTE — CONSULT NOTE ADULT - SUBJECTIVE AND OBJECTIVE BOX
1 week h/o left leg wound with infection--> progressed on oral abx    PE: left lower leg with 5x5x2 cm wound with erythema and necrotic tissue and drainage    pro: sharp excisional debridement to and including subcut tissue

## 2018-08-13 NOTE — H&P ADULT - HISTORY OF PRESENT ILLNESS
74 yo female PMH autoimmune hepatitis, HTN, DVT/PE on coumadin presented for new onset left leg blister that started on 4 days ago. She reports seeing a blister the color of beets and it then developed a black center that grew in size over 24 hours then burst spontanuously and started oozing blood. She had multiple small purple spots appear on her left jaw, bilateral forearms, left elbow and arm. She denies f/c, n/v/d, dysuria, lower back pain.  She was evaluated in [facility] ED and sent for burn evaluation.    Of note, she was admitted in february for right leg blister care and complicated by AHRF 2/2 fungal PNA.    In ED she received 1 dose of vanomycin. 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 She reports seeing a blister the color of beets and it then developed a black center that grew in size over 24 hours then burst spontanuously and started oozing blood. She had multiple small purple spots appear on her left jaw, bilateral forearms, left elbow and arm. She denies f/c, n/v/d, dysuria, lower back pain.  She was evaluated in [facility] ED and sent for burn evaluation.    Of note, she was admitted in february for right leg blister care and complicated by AHRF 2/2 fungal PNA.    In ED she received 1 dose of vanomycin. 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 She reports seeing a blister the color of beets and it then developed a black center that grew in size over 24 hours then burst spontanuously and started oozing blood. She had multiple small purple spots appear on her left jaw, bilateral forearms, left elbow and arm. She denies f/c, n/v/d, dysuria, lower back pain.  She was evaluated in [facility] ED and sent for burn evaluation.    Of note, she was admitted in february for right leg blister care and complicated by AHRF 2/2 fungal PNA.--Was complicated by CDiff, alveolar hemorrhage, and deconditioning requiring 1 month of SNF rehab;    In ED she received 1 dose of vancomycin

## 2018-08-13 NOTE — H&P ADULT - ATTENDING COMMENTS
Chart reviewed, patient examined. Pertinent results reviewed.  would get EKG, check preop labs; Pt is at least moderate risk for GA, but stable; Pt was on hi dose O2 for a prolonged period of time during her last admission.   Have ID see pt to evaluate & advise.

## 2018-08-13 NOTE — BRIEF OPERATIVE NOTE - PROCEDURE
<<-----Click on this checkbox to enter Procedure Debridement  08/13/2018  sharp excisional debridement left lower leg [id number]  Active  [email or web address]

## 2018-08-13 NOTE — CONSULT NOTE ADULT - ASSESSMENT
infected left lower leg wound    rec: soap and water qd, xeroform gauze and kerlex and ace wrap dressing change bid, will debride in OR 8/14/18

## 2018-08-13 NOTE — H&P ADULT - NSHPREVIEWOFSYSTEMS_GEN_ALL_CORE
REVIEW OF SYSTEMS      General:	  ENMT:	  Respiratory and Thorax:  Cardiovascular:	  Gastrointestinal:	  Genitourinary:	  Musculoskeletal:	  Neurological:	  Psychiatric:	  Hematology/Lymphatics:	  Endocrine:	  Heme/immunologic: REVIEW OF SYSTEMS      General:	No f/c  ENMT:	no cough or sore throat  Respiratory and Thorax: no cough or SOB  Cardiovascular:	no CP or palpitation  Gastrointestinal:	no n/v/d  Genitourinary:	no dysuria  Musculoskeletal:	new onset ecchymosis and leg bulla  Neurological:	negative  Psychiatric:	negative  Hematology/Lymphatics:	negative  Endocrine:	negative  Heme/immunologic: negative

## 2018-08-13 NOTE — H&P ADULT - PMH
Asthma    Autoimmune hepatitis    Autoimmune hepatitis treated with steroids    DVT (deep venous thrombosis)    Essential hypertension    Other chronic pulmonary embolism without acute cor pulmonale

## 2018-08-14 ENCOUNTER — RESULT REVIEW (OUTPATIENT)
Age: 73
End: 2018-08-14

## 2018-08-14 LAB
ANION GAP SERPL CALC-SCNC: 14 MMOL/L — SIGNIFICANT CHANGE UP (ref 7–14)
BLD GP AB SCN SERPL QL: SIGNIFICANT CHANGE UP
BUN SERPL-MCNC: 17 MG/DL — SIGNIFICANT CHANGE UP (ref 10–20)
CALCIUM SERPL-MCNC: 8.4 MG/DL — LOW (ref 8.5–10.1)
CHLORIDE SERPL-SCNC: 106 MMOL/L — SIGNIFICANT CHANGE UP (ref 98–110)
CO2 SERPL-SCNC: 26 MMOL/L — SIGNIFICANT CHANGE UP (ref 17–32)
CREAT SERPL-MCNC: 0.9 MG/DL — SIGNIFICANT CHANGE UP (ref 0.7–1.5)
GLUCOSE SERPL-MCNC: 102 MG/DL — HIGH (ref 70–99)
HCT VFR BLD CALC: 40 % — SIGNIFICANT CHANGE UP (ref 37–47)
HGB BLD-MCNC: 12.6 G/DL — SIGNIFICANT CHANGE UP (ref 12–16)
MAGNESIUM SERPL-MCNC: 1.9 MG/DL — SIGNIFICANT CHANGE UP (ref 1.8–2.4)
MCHC RBC-ENTMCNC: 25.7 PG — LOW (ref 27–31)
MCHC RBC-ENTMCNC: 31.5 G/DL — LOW (ref 32–37)
MCV RBC AUTO: 81.5 FL — SIGNIFICANT CHANGE UP (ref 81–99)
NRBC # BLD: 0 /100 WBCS — SIGNIFICANT CHANGE UP (ref 0–0)
PHOSPHATE SERPL-MCNC: 3.2 MG/DL — SIGNIFICANT CHANGE UP (ref 2.1–4.9)
PLATELET # BLD AUTO: 285 K/UL — SIGNIFICANT CHANGE UP (ref 130–400)
POTASSIUM SERPL-MCNC: 3.6 MMOL/L — SIGNIFICANT CHANGE UP (ref 3.5–5)
POTASSIUM SERPL-SCNC: 3.6 MMOL/L — SIGNIFICANT CHANGE UP (ref 3.5–5)
RBC # BLD: 4.91 M/UL — SIGNIFICANT CHANGE UP (ref 4.2–5.4)
RBC # FLD: 17.3 % — HIGH (ref 11.5–14.5)
SODIUM SERPL-SCNC: 146 MMOL/L — SIGNIFICANT CHANGE UP (ref 135–146)
TYPE + AB SCN PNL BLD: SIGNIFICANT CHANGE UP
WBC # BLD: 10.07 K/UL — SIGNIFICANT CHANGE UP (ref 4.8–10.8)
WBC # FLD AUTO: 10.07 K/UL — SIGNIFICANT CHANGE UP (ref 4.8–10.8)

## 2018-08-14 RX ORDER — METOPROLOL TARTRATE 50 MG
25 TABLET ORAL
Qty: 0 | Refills: 0 | Status: DISCONTINUED | OUTPATIENT
Start: 2018-08-14 | End: 2018-08-17

## 2018-08-14 RX ORDER — HYDROMORPHONE HYDROCHLORIDE 2 MG/ML
1 INJECTION INTRAMUSCULAR; INTRAVENOUS; SUBCUTANEOUS ONCE
Qty: 0 | Refills: 0 | Status: DISCONTINUED | OUTPATIENT
Start: 2018-08-14 | End: 2018-08-15

## 2018-08-14 RX ORDER — PANTOPRAZOLE SODIUM 20 MG/1
40 TABLET, DELAYED RELEASE ORAL
Qty: 0 | Refills: 0 | Status: DISCONTINUED | OUTPATIENT
Start: 2018-08-14 | End: 2018-08-17

## 2018-08-14 RX ORDER — AMLODIPINE BESYLATE 2.5 MG/1
5 TABLET ORAL DAILY
Qty: 0 | Refills: 0 | Status: DISCONTINUED | OUTPATIENT
Start: 2018-08-14 | End: 2018-08-17

## 2018-08-14 RX ORDER — OXYCODONE AND ACETAMINOPHEN 5; 325 MG/1; MG/1
2 TABLET ORAL EVERY 6 HOURS
Qty: 0 | Refills: 0 | Status: DISCONTINUED | OUTPATIENT
Start: 2018-08-14 | End: 2018-08-17

## 2018-08-14 RX ORDER — OXYCODONE AND ACETAMINOPHEN 5; 325 MG/1; MG/1
2 TABLET ORAL ONCE
Qty: 0 | Refills: 0 | Status: DISCONTINUED | OUTPATIENT
Start: 2018-08-14 | End: 2018-08-15

## 2018-08-14 RX ORDER — BUDESONIDE, MICRONIZED 100 %
3 POWDER (GRAM) MISCELLANEOUS DAILY
Qty: 0 | Refills: 0 | Status: DISCONTINUED | OUTPATIENT
Start: 2018-08-14 | End: 2018-08-17

## 2018-08-14 RX ORDER — ALBUTEROL 90 UG/1
1 AEROSOL, METERED ORAL EVERY 4 HOURS
Qty: 0 | Refills: 0 | Status: DISCONTINUED | OUTPATIENT
Start: 2018-08-14 | End: 2018-08-17

## 2018-08-14 RX ORDER — TACROLIMUS 5 MG/1
0.5 CAPSULE ORAL
Qty: 0 | Refills: 0 | Status: DISCONTINUED | OUTPATIENT
Start: 2018-08-14 | End: 2018-08-17

## 2018-08-14 RX ORDER — VANCOMYCIN HCL 1 G
1000 VIAL (EA) INTRAVENOUS EVERY 12 HOURS
Qty: 0 | Refills: 0 | Status: DISCONTINUED | OUTPATIENT
Start: 2018-08-14 | End: 2018-08-17

## 2018-08-14 RX ORDER — ONDANSETRON 8 MG/1
4 TABLET, FILM COATED ORAL ONCE
Qty: 0 | Refills: 0 | Status: DISCONTINUED | OUTPATIENT
Start: 2018-08-14 | End: 2018-08-15

## 2018-08-14 RX ORDER — TIOTROPIUM BROMIDE 18 UG/1
1 CAPSULE ORAL; RESPIRATORY (INHALATION) DAILY
Qty: 0 | Refills: 0 | Status: DISCONTINUED | OUTPATIENT
Start: 2018-08-14 | End: 2018-08-17

## 2018-08-14 RX ORDER — HYDROMORPHONE HYDROCHLORIDE 2 MG/ML
0.5 INJECTION INTRAMUSCULAR; INTRAVENOUS; SUBCUTANEOUS
Qty: 0 | Refills: 0 | Status: DISCONTINUED | OUTPATIENT
Start: 2018-08-14 | End: 2018-08-15

## 2018-08-14 RX ORDER — MONTELUKAST 4 MG/1
10 TABLET, CHEWABLE ORAL AT BEDTIME
Qty: 0 | Refills: 0 | Status: DISCONTINUED | OUTPATIENT
Start: 2018-08-14 | End: 2018-08-17

## 2018-08-14 RX ORDER — OXYCODONE AND ACETAMINOPHEN 5; 325 MG/1; MG/1
1 TABLET ORAL EVERY 6 HOURS
Qty: 0 | Refills: 0 | Status: DISCONTINUED | OUTPATIENT
Start: 2018-08-14 | End: 2018-08-17

## 2018-08-14 RX ORDER — FUROSEMIDE 40 MG
20 TABLET ORAL DAILY
Qty: 0 | Refills: 0 | Status: DISCONTINUED | OUTPATIENT
Start: 2018-08-14 | End: 2018-08-17

## 2018-08-14 RX ORDER — SODIUM CHLORIDE 9 MG/ML
1000 INJECTION, SOLUTION INTRAVENOUS
Qty: 0 | Refills: 0 | Status: DISCONTINUED | OUTPATIENT
Start: 2018-08-14 | End: 2018-08-15

## 2018-08-14 RX ORDER — ACETAMINOPHEN 500 MG
650 TABLET ORAL EVERY 6 HOURS
Qty: 0 | Refills: 0 | Status: DISCONTINUED | OUTPATIENT
Start: 2018-08-14 | End: 2018-08-17

## 2018-08-14 RX ADMIN — PANTOPRAZOLE SODIUM 40 MILLIGRAM(S): 20 TABLET, DELAYED RELEASE ORAL at 06:33

## 2018-08-14 RX ADMIN — Medication 10 MILLIGRAM(S): at 06:33

## 2018-08-14 RX ADMIN — Medication 250 MILLIGRAM(S): at 06:35

## 2018-08-14 RX ADMIN — TIOTROPIUM BROMIDE 1 CAPSULE(S): 18 CAPSULE ORAL; RESPIRATORY (INHALATION) at 08:32

## 2018-08-14 RX ADMIN — Medication 250 MILLIGRAM(S): at 17:48

## 2018-08-14 RX ADMIN — MONTELUKAST 10 MILLIGRAM(S): 4 TABLET, CHEWABLE ORAL at 23:37

## 2018-08-14 RX ADMIN — Medication 25 MILLIGRAM(S): at 17:48

## 2018-08-14 RX ADMIN — TACROLIMUS 0.5 MILLIGRAM(S): 5 CAPSULE ORAL at 06:33

## 2018-08-14 RX ADMIN — Medication 25 MILLIGRAM(S): at 06:33

## 2018-08-14 RX ADMIN — Medication 20 MILLIGRAM(S): at 06:33

## 2018-08-14 RX ADMIN — TACROLIMUS 0.5 MILLIGRAM(S): 5 CAPSULE ORAL at 17:48

## 2018-08-14 RX ADMIN — AMLODIPINE BESYLATE 5 MILLIGRAM(S): 2.5 TABLET ORAL at 06:33

## 2018-08-14 RX ADMIN — SODIUM CHLORIDE 75 MILLILITER(S): 9 INJECTION INTRAMUSCULAR; INTRAVENOUS; SUBCUTANEOUS at 00:33

## 2018-08-14 RX ADMIN — SODIUM CHLORIDE 70 MILLILITER(S): 9 INJECTION, SOLUTION INTRAVENOUS at 12:50

## 2018-08-14 NOTE — BRIEF OPERATIVE NOTE - PRE-OP DX
Wound  08/13/2018  infected left lower leg wound  Active  Enrike Kang
Wound  08/13/2018  infected left lower leg wound  Active  Enrike Kang

## 2018-08-14 NOTE — PROGRESS NOTE ADULT - SUBJECTIVE AND OBJECTIVE BOX
SUBJECTIVE:    Patient is a 73y old Female who presents with a chief complaint of left  foot blister (13 Aug 2018 03:39)    Currently admitted to medicine with the primary diagnosis of Necrosis of skin or subcutaneous tissue     Today is hospital day 1d. This morning she is resting comfortably in bed and reports no new issues or overnight events.     PAST MEDICAL & SURGICAL HISTORY  Autoimmune hepatitis  Other chronic pulmonary embolism without acute cor pulmonale  Essential hypertension  Autoimmune hepatitis treated with steroids  Asthma  DVT (deep venous thrombosis)  No significant past surgical history    SOCIAL HISTORY:  Negative for smoking/alcohol/drug use.     Home Medications:  Home Medications:  acetaminophen 325 mg oral tablet: 2 tab(s) orally every 6 hours, As needed, pain (12 Aug 2018 11:44)  albuterol 90 mcg/inh inhalation aerosol: 1 puff(s) inhaled every 4 hours, As Needed for shortness of breath (12 Aug 2018 11:44)  amLODIPine 5 mg oral tablet: 1 tab(s) orally once a day (12 Aug 2018 11:44)  budesonide 3 mg oral capsule, extended release:  (12 Aug 2018 11:44)  Cipro:  (12 Aug 2018 11:44)  furosemide 20 mg oral tablet:  (12 Aug 2018 11:44)  Iron 100 Plus:  (12 Aug 2018 11:44)  metoprolol tartrate 25 mg oral tablet: 1 tab(s) orally 2 times a day (12 Aug 2018 11:44)  montelukast 10 mg oral tablet: 1 tab(s) orally once a day (at bedtime) (12 Aug 2018 11:44)  pantoprazole 40 mg oral delayed release tablet: 1 tab(s) orally once a day (before a meal) (12 Aug 2018 11:44)  predniSONE 10 mg oral tablet: 1 tab(s) orally once a day (12 Aug 2018 11:44)  tacrolimus 0.5 mg oral capsule: 1 cap(s) orally once a day (at bedtime) (12 Aug 2018 11:44)  tiotropium 18 mcg inhalation capsule: 1 cap(s) inhaled once a day (12 Aug 2018 11:44)  warfarin 2 mg oral tablet: 1 tab(s) orally once a day (12 Aug 2018 11:44)      ALLERGIES:  No Known Allergies    MEDICATIONS:  STANDING MEDICATIONS  amLODIPine   Tablet 5 milliGRAM(s) Oral daily  buDESOnide   EC Oral Capsule - Peds 3 milliGRAM(s) Oral daily  furosemide    Tablet 20 milliGRAM(s) Oral daily  lactated ringers. 1000 milliLiter(s) IV Continuous <Continuous>  metoprolol tartrate 25 milliGRAM(s) Oral two times a day  montelukast 10 milliGRAM(s) Oral at bedtime  pantoprazole    Tablet 40 milliGRAM(s) Oral before breakfast  predniSONE   Tablet 10 milliGRAM(s) Oral daily  tacrolimus 0.5 milliGRAM(s) Oral two times a day  tiotropium 18 MICROgram(s) Capsule 1 Capsule(s) Inhalation daily  vancomycin  IVPB 1000 milliGRAM(s) IV Intermittent every 12 hours    PRN MEDICATIONS  acetaminophen   Tablet. 650 milliGRAM(s) Oral every 6 hours PRN  ALBUTerol    90 MICROgram(s) HFA Inhaler 1 Puff(s) Inhalation every 4 hours PRN  HYDROmorphone  Injectable 0.5 milliGRAM(s) IV Push every 10 minutes PRN  HYDROmorphone  Injectable 1 milliGRAM(s) IV Push once PRN  ondansetron Injectable 4 milliGRAM(s) IV Push once PRN  oxyCODONE    5 mG/acetaminophen 325 mG 2 Tablet(s) Oral once PRN  oxyCODONE    5 mG/acetaminophen 325 mG 1 Tablet(s) Oral every 6 hours PRN  oxyCODONE    5 mG/acetaminophen 325 mG 2 Tablet(s) Oral every 6 hours PRN    VITALS:   Vital Signs Last 24 Hrs  T(C): 36.5 (14 Aug 2018 14:00), Max: 36.6 (14 Aug 2018 06:49)  T(F): 97.7 (14 Aug 2018 14:00), Max: 97.9 (14 Aug 2018 06:49)  HR: 92 (14 Aug 2018 15:30) (68 - 103)  BP: 142/70 (14 Aug 2018 15:30) (106/76 - 171/91)  BP(mean): --  RR: 24 (14 Aug 2018 15:30) (16 - 24)  SpO2: 99% (14 Aug 2018 15:30) (97% - 99%)  CAPILLARY BLOOD GLUCOSE          LABS:                        12.6   10.07 )-----------( 285      ( 14 Aug 2018 01:07 )             40.0     08-14    146  |  106  |  17  ----------------------------<  102<H>  3.6   |  26  |  0.9    Ca    8.4<L>      14 Aug 2018 01:07  Phos  3.2     08-14  Mg     1.9     08-14    TPro  5.1<L>  /  Alb  3.0<L>  /  TBili  0.3  /  DBili  x   /  AST  26  /  ALT  43<H>  /  AlkPhos  135<H>  08-13    PT/INR - ( 13 Aug 2018 07:11 )   PT: 23.70 sec;   INR: 2.21 ratio         PTT - ( 13 Aug 2018 07:11 )  PTT:28.9 sec          Culture - Blood (collected 13 Aug 2018 00:48)  Source: .Blood Blood  Preliminary Report (14 Aug 2018 12:01):    No growth to date.    Culture - Blood (collected 13 Aug 2018 00:48)  Source: .Blood Blood  Preliminary Report (14 Aug 2018 12:01):    No growth to date.          RADIOLOGY:    PHYSICAL EXAM:  GEN: No acute distress  LUNGS: Clear to auscultation bilaterally   HEART: S1/S2 present. RRR.   ABD: Soft, non-tender, non-distended. Bowel sounds present  EXT: NC/NC/NE/2+PP/ARIAS  NEURO: AAOX3 SUBJECTIVE:    Patient is a 73y old Female who presents with a chief complaint of left  foot blister (13 Aug 2018 03:39)    Currently admitted to medicine with the primary diagnosis of Necrosis of skin or subcutaneous tissue     Today is hospital day 1d. This morning she is resting comfortably in bed and reports no new issues or overnight events.     PAST MEDICAL & SURGICAL HISTORY  Autoimmune hepatitis  Other chronic pulmonary embolism without acute cor pulmonale  Essential hypertension  Autoimmune hepatitis treated with steroids  Asthma  DVT (deep venous thrombosis)  No significant past surgical history    SOCIAL HISTORY:  Negative for smoking/alcohol/drug use.     Home Medications:  Home Medications:  acetaminophen 325 mg oral tablet: 2 tab(s) orally every 6 hours, As needed, pain (12 Aug 2018 11:44)  albuterol 90 mcg/inh inhalation aerosol: 1 puff(s) inhaled every 4 hours, As Needed for shortness of breath (12 Aug 2018 11:44)  amLODIPine 5 mg oral tablet: 1 tab(s) orally once a day (12 Aug 2018 11:44)  budesonide 3 mg oral capsule, extended release:  (12 Aug 2018 11:44)  Cipro:  (12 Aug 2018 11:44)  furosemide 20 mg oral tablet:  (12 Aug 2018 11:44)  Iron 100 Plus:  (12 Aug 2018 11:44)  metoprolol tartrate 25 mg oral tablet: 1 tab(s) orally 2 times a day (12 Aug 2018 11:44)  montelukast 10 mg oral tablet: 1 tab(s) orally once a day (at bedtime) (12 Aug 2018 11:44)  pantoprazole 40 mg oral delayed release tablet: 1 tab(s) orally once a day (before a meal) (12 Aug 2018 11:44)  predniSONE 10 mg oral tablet: 1 tab(s) orally once a day (12 Aug 2018 11:44)  tacrolimus 0.5 mg oral capsule: 1 cap(s) orally once a day (at bedtime) (12 Aug 2018 11:44)  tiotropium 18 mcg inhalation capsule: 1 cap(s) inhaled once a day (12 Aug 2018 11:44)  warfarin 2 mg oral tablet: 1 tab(s) orally once a day (12 Aug 2018 11:44)      ALLERGIES:  No Known Allergies    MEDICATIONS:  STANDING MEDICATIONS  amLODIPine   Tablet 5 milliGRAM(s) Oral daily  buDESOnide   EC Oral Capsule - Peds 3 milliGRAM(s) Oral daily  furosemide    Tablet 20 milliGRAM(s) Oral daily  lactated ringers. 1000 milliLiter(s) IV Continuous <Continuous>  metoprolol tartrate 25 milliGRAM(s) Oral two times a day  montelukast 10 milliGRAM(s) Oral at bedtime  pantoprazole    Tablet 40 milliGRAM(s) Oral before breakfast  predniSONE   Tablet 10 milliGRAM(s) Oral daily  tacrolimus 0.5 milliGRAM(s) Oral two times a day  tiotropium 18 MICROgram(s) Capsule 1 Capsule(s) Inhalation daily  vancomycin  IVPB 1000 milliGRAM(s) IV Intermittent every 12 hours    PRN MEDICATIONS  acetaminophen   Tablet. 650 milliGRAM(s) Oral every 6 hours PRN  ALBUTerol    90 MICROgram(s) HFA Inhaler 1 Puff(s) Inhalation every 4 hours PRN  HYDROmorphone  Injectable 0.5 milliGRAM(s) IV Push every 10 minutes PRN  HYDROmorphone  Injectable 1 milliGRAM(s) IV Push once PRN  ondansetron Injectable 4 milliGRAM(s) IV Push once PRN  oxyCODONE    5 mG/acetaminophen 325 mG 2 Tablet(s) Oral once PRN  oxyCODONE    5 mG/acetaminophen 325 mG 1 Tablet(s) Oral every 6 hours PRN  oxyCODONE    5 mG/acetaminophen 325 mG 2 Tablet(s) Oral every 6 hours PRN    VITALS:   Vital Signs Last 24 Hrs  T(C): 36.5 (14 Aug 2018 14:00), Max: 36.6 (14 Aug 2018 06:49)  T(F): 97.7 (14 Aug 2018 14:00), Max: 97.9 (14 Aug 2018 06:49)  HR: 92 (14 Aug 2018 15:30) (68 - 103)  BP: 142/70 (14 Aug 2018 15:30) (106/76 - 171/91)  BP(mean): --  RR: 24 (14 Aug 2018 15:30) (16 - 24)  SpO2: 99% (14 Aug 2018 15:30) (97% - 99%)  CAPILLARY BLOOD GLUCOSE          LABS:                        12.6   10.07 )-----------( 285      ( 14 Aug 2018 01:07 )             40.0     08-14    146  |  106  |  17  ----------------------------<  102<H>  3.6   |  26  |  0.9    Ca    8.4<L>      14 Aug 2018 01:07  Phos  3.2     08-14  Mg     1.9     08-14    TPro  5.1<L>  /  Alb  3.0<L>  /  TBili  0.3  /  DBili  x   /  AST  26  /  ALT  43<H>  /  AlkPhos  135<H>  08-13    PT/INR - ( 13 Aug 2018 07:11 )   PT: 23.70 sec;   INR: 2.21 ratio         PTT - ( 13 Aug 2018 07:11 )  PTT:28.9 sec          Culture - Blood (collected 13 Aug 2018 00:48)  Source: .Blood Blood  Preliminary Report (14 Aug 2018 12:01):    No growth to date.    Culture - Blood (collected 13 Aug 2018 00:48)  Source: .Blood Blood  Preliminary Report (14 Aug 2018 12:01):    No growth to date.          RADIOLOGY:    PHYSICAL EXAM:  GEN: No acute distress  LUNGS: Clear to auscultation bilaterally   HEART: S1/S2 present. RRR.   ABD: Soft, non-tender, non-distended. Bowel sounds present  EXT: Left leg ulcer with necrotic center  NEURO: AAOX3

## 2018-08-14 NOTE — CHART NOTE - NSCHARTNOTEFT_GEN_A_CORE
PACU ANESTHESIA ADMISSION NOTE      Procedure: Debridement: sharp excisional debridement and irrigation left lower lateral leg 10x5cm  Debridement: sharp excisional debridement left lower leg [id number]    Post op diagnosis:  Wound      ____  Intubated  TV:______       Rate: ______      FiO2: ______    _x___  Patent Airway    ____  Full return of protective reflexes    __x__  Full recovery from anesthesia / back to baseline status    Vitals:  T(F): 96.2  HR: 77  BP: 106/75  RR: 22  SpO2: 98%    Mental Status:  __x__ Awake   ____x_ Alert   _____ Drowsy   _____ Sedated    Nausea/Vomiting:  ___x_ NO  ______Yes,   See Post - Op Orders          Pain Scale (0-10):  _____    Treatment: ____ None    __x__ See Post - Op/PCA Orders    Post - Operative Fluids:   ____ Oral   _x___ See Post - Op Orders    Plan: Discharge:   ____Home       __x___Floor     _____Critical Care    _____  Other:_________________    Comments: Patient tolerated procedure  No anesthesia complications

## 2018-08-14 NOTE — BRIEF OPERATIVE NOTE - PROCEDURE
<<-----Click on this checkbox to enter Procedure Debridement  08/14/2018  sharp excisional debridement and irrigation left lower lateral leg 10x5cm  Active  [email or web address]

## 2018-08-14 NOTE — BRIEF OPERATIVE NOTE - OPERATION/FINDINGS
necrotic tissue--> excision to and including subcut tissue
fat necrosis--> excision to and including fascia

## 2018-08-14 NOTE — PROGRESS NOTE ADULT - ASSESSMENT
72 yo female with AIH, DVT/PE presented for left leg erupting skin lesion.    # Left leg necrotic skin lesion  - c/w vancomycin  - Burn recommended debridement today   - f/u u/a, u cx and bl cx    # scattered ecchymotic rash:  - non blanching ecchymosis in multiple skin sites  - warfarin toxicity less likely ( INR wnl)  - possible vasculitis, f/u ESR    # DVT/PE:  - on chronic coumadin  - f/u AM INR and  start heparin gtt if INR<2  - f/u LE dupplex    # Autoimmune hepatitis:  - f/u tacrolimus level  - c/w tacrolimus, budesonide, prednisone  - no signs of active disease    # DVT PPX: warfarin/heparin  Full code

## 2018-08-15 LAB
ANION GAP SERPL CALC-SCNC: 13 MMOL/L — SIGNIFICANT CHANGE UP (ref 7–14)
APTT BLD: 25.9 SEC — LOW (ref 27–39.2)
APTT BLD: 78.3 SEC — CRITICAL HIGH (ref 27–39.2)
BUN SERPL-MCNC: 17 MG/DL — SIGNIFICANT CHANGE UP (ref 10–20)
CALCIUM SERPL-MCNC: 8.6 MG/DL — SIGNIFICANT CHANGE UP (ref 8.5–10.1)
CHLORIDE SERPL-SCNC: 106 MMOL/L — SIGNIFICANT CHANGE UP (ref 98–110)
CO2 SERPL-SCNC: 27 MMOL/L — SIGNIFICANT CHANGE UP (ref 17–32)
CREAT SERPL-MCNC: 0.9 MG/DL — SIGNIFICANT CHANGE UP (ref 0.7–1.5)
GLUCOSE SERPL-MCNC: 87 MG/DL — SIGNIFICANT CHANGE UP (ref 70–99)
HCT VFR BLD CALC: 39.3 % — SIGNIFICANT CHANGE UP (ref 37–47)
HGB BLD-MCNC: 12.2 G/DL — SIGNIFICANT CHANGE UP (ref 12–16)
INR BLD: 1.59 RATIO — HIGH (ref 0.65–1.3)
MCHC RBC-ENTMCNC: 25.6 PG — LOW (ref 27–31)
MCHC RBC-ENTMCNC: 31 G/DL — LOW (ref 32–37)
MCV RBC AUTO: 82.4 FL — SIGNIFICANT CHANGE UP (ref 81–99)
NRBC # BLD: 0 /100 WBCS — SIGNIFICANT CHANGE UP (ref 0–0)
PLATELET # BLD AUTO: 266 K/UL — SIGNIFICANT CHANGE UP (ref 130–400)
POTASSIUM SERPL-MCNC: 3.6 MMOL/L — SIGNIFICANT CHANGE UP (ref 3.5–5)
POTASSIUM SERPL-SCNC: 3.6 MMOL/L — SIGNIFICANT CHANGE UP (ref 3.5–5)
PROTHROM AB SERPL-ACNC: 17.1 SEC — HIGH (ref 9.95–12.87)
RBC # BLD: 4.77 M/UL — SIGNIFICANT CHANGE UP (ref 4.2–5.4)
RBC # FLD: 17.2 % — HIGH (ref 11.5–14.5)
SODIUM SERPL-SCNC: 146 MMOL/L — SIGNIFICANT CHANGE UP (ref 135–146)
WBC # BLD: 9.29 K/UL — SIGNIFICANT CHANGE UP (ref 4.8–10.8)
WBC # FLD AUTO: 9.29 K/UL — SIGNIFICANT CHANGE UP (ref 4.8–10.8)

## 2018-08-15 RX ORDER — HEPARIN SODIUM 5000 [USP'U]/ML
1300 INJECTION INTRAVENOUS; SUBCUTANEOUS
Qty: 25000 | Refills: 0 | Status: DISCONTINUED | OUTPATIENT
Start: 2018-08-15 | End: 2018-08-17

## 2018-08-15 RX ORDER — WARFARIN SODIUM 2.5 MG/1
2 TABLET ORAL ONCE
Qty: 0 | Refills: 0 | Status: COMPLETED | OUTPATIENT
Start: 2018-08-15 | End: 2018-08-15

## 2018-08-15 RX ADMIN — Medication 650 MILLIGRAM(S): at 04:20

## 2018-08-15 RX ADMIN — Medication 10 MILLIGRAM(S): at 06:07

## 2018-08-15 RX ADMIN — Medication 250 MILLIGRAM(S): at 19:14

## 2018-08-15 RX ADMIN — Medication 3 MILLIGRAM(S): at 11:17

## 2018-08-15 RX ADMIN — MONTELUKAST 10 MILLIGRAM(S): 4 TABLET, CHEWABLE ORAL at 22:20

## 2018-08-15 RX ADMIN — Medication 650 MILLIGRAM(S): at 04:02

## 2018-08-15 RX ADMIN — Medication 250 MILLIGRAM(S): at 06:06

## 2018-08-15 RX ADMIN — TACROLIMUS 0.5 MILLIGRAM(S): 5 CAPSULE ORAL at 06:07

## 2018-08-15 RX ADMIN — HEPARIN SODIUM 13 UNIT(S)/HR: 5000 INJECTION INTRAVENOUS; SUBCUTANEOUS at 12:00

## 2018-08-15 RX ADMIN — Medication 25 MILLIGRAM(S): at 17:09

## 2018-08-15 RX ADMIN — Medication 20 MILLIGRAM(S): at 06:06

## 2018-08-15 RX ADMIN — TACROLIMUS 0.5 MILLIGRAM(S): 5 CAPSULE ORAL at 19:14

## 2018-08-15 RX ADMIN — Medication 25 MILLIGRAM(S): at 06:06

## 2018-08-15 RX ADMIN — AMLODIPINE BESYLATE 5 MILLIGRAM(S): 2.5 TABLET ORAL at 06:06

## 2018-08-15 RX ADMIN — HEPARIN SODIUM 13 UNIT(S)/HR: 5000 INJECTION INTRAVENOUS; SUBCUTANEOUS at 23:57

## 2018-08-15 RX ADMIN — WARFARIN SODIUM 2 MILLIGRAM(S): 2.5 TABLET ORAL at 22:20

## 2018-08-15 RX ADMIN — PANTOPRAZOLE SODIUM 40 MILLIGRAM(S): 20 TABLET, DELAYED RELEASE ORAL at 06:06

## 2018-08-15 RX ADMIN — OXYCODONE AND ACETAMINOPHEN 1 TABLET(S): 5; 325 TABLET ORAL at 10:34

## 2018-08-15 NOTE — PROGRESS NOTE ADULT - ASSESSMENT
infected left lower leg wound post debridement--> local wound care with xeroform gauze and kerlex and ace wrap , iv abx , no further surgery

## 2018-08-15 NOTE — PROGRESS NOTE ADULT - SUBJECTIVE AND OBJECTIVE BOX
Chart reviewed, patient examined. Pertinent results reviewed.  specialist f/u reviewed  HD#4 (ER on 8/12); POD#1    SUBJECTIVE:  Patient is a 73y old Female who presents with a chief complaint of left  foot blister (13 Aug 2018 03:39)    Currently admitted to medicine with the primary diagnosis of Necrosis of skin or subcutaneous tissue   . This morning she is resting comfortably,  POD#1 after debridement of wound by Burn SVC. p/s pain < prior to  surgery    PAST MEDICAL & SURGICAL HISTORY  Autoimmune hepatitis  Other chronic pulmonary embolism without acute cor pulmonale  Essential hypertension  Autoimmune hepatitis treated with steroids  Asthma  DVT (deep venous thrombosis)  No significant past surgical history    SOCIAL HISTORY:  Negative for smoking/alcohol/drug use.     Home Medications:  Home Medications:  acetaminophen 325 mg oral tablet: 2 tab(s) orally every 6 hours, As needed, pain (12 Aug 2018 11:44)  albuterol 90 mcg/inh inhalation aerosol: 1 puff(s) inhaled every 4 hours, As Needed for shortness of breath (12 Aug 2018 11:44)  amLODIPine 5 mg oral tablet: 1 tab(s) orally once a day (12 Aug 2018 11:44)  budesonide 3 mg oral capsule, extended release:  (12 Aug 2018 11:44)  Cipro:  (12 Aug 2018 11:44)  furosemide 20 mg oral tablet:  (12 Aug 2018 11:44)  Iron 100 Plus:  (12 Aug 2018 11:44)  metoprolol tartrate 25 mg oral tablet: 1 tab(s) orally 2 times a day (12 Aug 2018 11:44)  montelukast 10 mg oral tablet: 1 tab(s) orally once a day (at bedtime) (12 Aug 2018 11:44)  pantoprazole 40 mg oral delayed release tablet: 1 tab(s) orally once a day (before a meal) (12 Aug 2018 11:44)  predniSONE 10 mg oral tablet: 1 tab(s) orally once a day (12 Aug 2018 11:44)  tacrolimus 0.5 mg oral capsule: 1 cap(s) orally once a day (at bedtime) (12 Aug 2018 11:44)  tiotropium 18 mcg inhalation capsule: 1 cap(s) inhaled once a day (12 Aug 2018 11:44)  warfarin 2 mg oral tablet: 1 tab(s) orally once a day (12 Aug 2018 11:44)      ALLERGIES:  No Known Allergies    MEDICATIONS:  STANDING MEDICATIONS  amLODIPine   Tablet 5 milliGRAM(s) Oral daily  buDESOnide   EC Oral Capsule - Peds 3 milliGRAM(s) Oral daily  furosemide    Tablet 20 milliGRAM(s) Oral daily  lactated ringers. 1000 milliLiter(s) IV Continuous <Continuous>  metoprolol tartrate 25 milliGRAM(s) Oral two times a day  montelukast 10 milliGRAM(s) Oral at bedtime  pantoprazole    Tablet 40 milliGRAM(s) Oral before breakfast  predniSONE   Tablet 10 milliGRAM(s) Oral daily  tacrolimus 0.5 milliGRAM(s) Oral two times a day  tiotropium 18 MICROgram(s) Capsule 1 Capsule(s) Inhalation daily  vancomycin  IVPB 1000 milliGRAM(s) IV Intermittent every 12 hours    PRN MEDICATIONS  acetaminophen   Tablet. 650 milliGRAM(s) Oral every 6 hours PRN  ALBUTerol    90 MICROgram(s) HFA Inhaler 1 Puff(s) Inhalation every 4 hours PRN  HYDROmorphone  Injectable 0.5 milliGRAM(s) IV Push every 10 minutes PRN  HYDROmorphone  Injectable 1 milliGRAM(s) IV Push once PRN  ondansetron Injectable 4 milliGRAM(s) IV Push once PRN  oxyCODONE    5 mG/acetaminophen 325 mG 2 Tablet(s) Oral once PRN  oxyCODONE    5 mG/acetaminophen 325 mG 1 Tablet(s) Oral every 6 hours PRN  oxyCODONE    5 mG/acetaminophen 325 mG 2 Tablet(s) Oral every 6 hours PRN    Vital Signs Last 24 Hrs  T(C): 36.6 (15 Aug 2018 01:00), Max: 36.6 (14 Aug 2018 13:30)  T(F): 97.9 (15 Aug 2018 01:00), Max: 97.9 (14 Aug 2018 13:30)  HR: 74 (15 Aug 2018 07:36) (69 - 92)  BP: 174/79 (15 Aug 2018 07:36) (106/76 - 174/79)  BP(mean): --  RR: 20 (15 Aug 2018 07:36) (18 - 24)  SpO2: 98% (15 Aug 2018 07:36) (97% - 99%)  LABS:                        12.6   10.07 )-----------( 285      ( 14 Aug 2018 01:07 )             40.0     08-14    146  |  106  |  17  ----------------------------<  102<H>  3.6   |  26  |  0.9    Ca    8.4<L>      14 Aug 2018 01:07  Phos  3.2     08-14  Mg     1.9     08-14    TPro  5.1<L>  /  Alb  3.0<L>  /  TBili  0.3  /  DBili  x   /  AST  26  /  ALT  43<H>  /  AlkPhos  135<H>  08-13    PT/INR - ( 13 Aug 2018 07:11 )   PT: 23.70 sec;   INR: 2.21 ratio         PTT - ( 13 Aug 2018 07:11 )  PTT:28.9 sec          Culture - Blood (collected 13 Aug 2018 00:48)  Source: .Blood Blood  Preliminary Report (14 Aug 2018 12:01):    No growth to date.    Culture - Blood (collected 13 Aug 2018 00:48)  Source: .Blood Blood  Preliminary Report (14 Aug 2018 12:01):    No growth to date.          RADIOLOGY:    PHYSICAL EXAM:  GEN: No acute distress; looks well, stands easily  LUNGS: Clear to auscultation bilaterally-min crackles L base   HEART: S1/S2 present. RRR.   ABD: Soft, non-tender, non-distended. Bowel sounds present  EXT: Left leg ulcer with necrotic center- now with surgical dressing  NEURO: AAOX3

## 2018-08-15 NOTE — PROGRESS NOTE ADULT - ASSESSMENT
74 yo female with AIH, DVT/PE presented for left leg erupting skin lesion.    # Left leg necrotic skin lesion  - c/w vancomycin  - Burn recommended debridement -did yesterday; -under local; tolerated well  - f/u u/a, u cx and bl cx;  - ask ID to advise on Antibiotic    # scattered ecchymotic rash:  - non blanching ecchymosis in multiple skin sites  - warfarin toxicity less likely ( INR wnl)  - possible vasculitis, f/u ESR    # DVT/PE:  - on chronic coumadin  - f/u AM INR and  start heparin gtt if INR<2  - f/u LE duplex  - need to restart AC ASAP-burn to clear ; prophylaxis for now    # Autoimmune hepatitis:  - f/u tacrolimus level  - c/w tacrolimus, budesonide, prednisone  - no signs of active disease    # DVT PPX: warfarin/heparin  Full code

## 2018-08-15 NOTE — PROGRESS NOTE ADULT - SUBJECTIVE AND OBJECTIVE BOX
pod#1    Vital Signs Last 24 Hrs  T(C): 36.6 (15 Aug 2018 01:00), Max: 36.6 (14 Aug 2018 13:30)  T(F): 97.9 (15 Aug 2018 01:00), Max: 97.9 (14 Aug 2018 13:30)  HR: 74 (15 Aug 2018 07:36) (69 - 92)  BP: 174/79 (15 Aug 2018 07:36) (118/69 - 174/79)  BP(mean): --  RR: 20 (15 Aug 2018 07:36) (18 - 24)  SpO2: 98% (15 Aug 2018 07:36) (97% - 99%)    CVP:  T(C): 36.6 (08-15-18 @ 01:00), Max: 36.6 (08-14-18 @ 13:30)  HR: 74 (08-15-18 @ 07:36) (69 - 92)  BP: 174/79 (08-15-18 @ 07:36) (118/69 - 174/79)  RR: 20 (08-15-18 @ 07:36) (18 - 24)  SpO2: 98% (08-15-18 @ 07:36) (97% - 99%)  CVP(mm Hg): --    U.O.:  I&O's Detail    14 Aug 2018 07:01  -  15 Aug 2018 07:00  --------------------------------------------------------  IN:    lactated ringers.: 245 mL    Oral Fluid: 150 mL  Total IN: 395 mL    OUT:    Voided: 650 mL  Total OUT: 650 mL    Total NET: -255 mL      15 Aug 2018 07:01  -  15 Aug 2018 12:19  --------------------------------------------------------  IN:    Oral Fluid: 250 mL  Total IN: 250 mL    OUT:    Voided: 350 mL  Total OUT: 350 mL    Total NET: -100 mL                                        12.2   9.29  )-----------( 266      ( 15 Aug 2018 04:30 )             39.3     08-15    146  |  106  |  17  ----------------------------<  87  3.6   |  27  |  0.9    Ca    8.6      15 Aug 2018 04:30  Phos  3.2     08-14  Mg     1.9     08-14        Large Dressing Change--> left lower leg--> granulating well

## 2018-08-16 LAB
ALBUMIN SERPL ELPH-MCNC: 3.1 G/DL — LOW (ref 3.5–5.2)
ALP SERPL-CCNC: 107 U/L — SIGNIFICANT CHANGE UP (ref 30–115)
ALT FLD-CCNC: 20 U/L — SIGNIFICANT CHANGE UP (ref 0–41)
ANION GAP SERPL CALC-SCNC: 15 MMOL/L — HIGH (ref 7–14)
APPEARANCE UR: ABNORMAL
APTT BLD: 124.9 SEC — CRITICAL HIGH (ref 27–39.2)
APTT BLD: 98.4 SEC — CRITICAL HIGH (ref 27–39.2)
AST SERPL-CCNC: 15 U/L — SIGNIFICANT CHANGE UP (ref 0–41)
BILIRUB SERPL-MCNC: 0.5 MG/DL — SIGNIFICANT CHANGE UP (ref 0.2–1.2)
BILIRUB UR-MCNC: NEGATIVE — SIGNIFICANT CHANGE UP
BUN SERPL-MCNC: 13 MG/DL — SIGNIFICANT CHANGE UP (ref 10–20)
CALCIUM SERPL-MCNC: 8.6 MG/DL — SIGNIFICANT CHANGE UP (ref 8.5–10.1)
CHLORIDE SERPL-SCNC: 105 MMOL/L — SIGNIFICANT CHANGE UP (ref 98–110)
CO2 SERPL-SCNC: 25 MMOL/L — SIGNIFICANT CHANGE UP (ref 17–32)
COLOR SPEC: YELLOW — SIGNIFICANT CHANGE UP
COMMENT - URINE: SIGNIFICANT CHANGE UP
CREAT SERPL-MCNC: 0.9 MG/DL — SIGNIFICANT CHANGE UP (ref 0.7–1.5)
DIFF PNL FLD: NEGATIVE — SIGNIFICANT CHANGE UP
EPI CELLS # UR: ABNORMAL /HPF
GLUCOSE SERPL-MCNC: 88 MG/DL — SIGNIFICANT CHANGE UP (ref 70–99)
GLUCOSE UR QL: NEGATIVE MG/DL — SIGNIFICANT CHANGE UP
HCT VFR BLD CALC: 38.7 % — SIGNIFICANT CHANGE UP (ref 37–47)
HGB BLD-MCNC: 12.1 G/DL — SIGNIFICANT CHANGE UP (ref 12–16)
INR BLD: 1.41 RATIO — HIGH (ref 0.65–1.3)
INR BLD: 1.44 RATIO — HIGH (ref 0.65–1.3)
KETONES UR-MCNC: NEGATIVE — SIGNIFICANT CHANGE UP
LEUKOCYTE ESTERASE UR-ACNC: ABNORMAL
MCHC RBC-ENTMCNC: 25.6 PG — LOW (ref 27–31)
MCHC RBC-ENTMCNC: 31.3 G/DL — LOW (ref 32–37)
MCV RBC AUTO: 81.8 FL — SIGNIFICANT CHANGE UP (ref 81–99)
NITRITE UR-MCNC: NEGATIVE — SIGNIFICANT CHANGE UP
NRBC # BLD: 0 /100 WBCS — SIGNIFICANT CHANGE UP (ref 0–0)
PH UR: 6.5 — SIGNIFICANT CHANGE UP (ref 5–8)
PLATELET # BLD AUTO: 283 K/UL — SIGNIFICANT CHANGE UP (ref 130–400)
POTASSIUM SERPL-MCNC: 3.5 MMOL/L — SIGNIFICANT CHANGE UP (ref 3.5–5)
POTASSIUM SERPL-SCNC: 3.5 MMOL/L — SIGNIFICANT CHANGE UP (ref 3.5–5)
PROT SERPL-MCNC: 5.2 G/DL — LOW (ref 6–8)
PROT UR-MCNC: NEGATIVE MG/DL — SIGNIFICANT CHANGE UP
PROTHROM AB SERPL-ACNC: 15.2 SEC — HIGH (ref 9.95–12.87)
PROTHROM AB SERPL-ACNC: 15.5 SEC — HIGH (ref 9.95–12.87)
RBC # BLD: 4.73 M/UL — SIGNIFICANT CHANGE UP (ref 4.2–5.4)
RBC # FLD: 16.8 % — HIGH (ref 11.5–14.5)
SODIUM SERPL-SCNC: 145 MMOL/L — SIGNIFICANT CHANGE UP (ref 135–146)
SP GR SPEC: 1.02 — SIGNIFICANT CHANGE UP (ref 1.01–1.03)
SURGICAL PATHOLOGY STUDY: SIGNIFICANT CHANGE UP
UROBILINOGEN FLD QL: 0.2 MG/DL — SIGNIFICANT CHANGE UP (ref 0.2–0.2)
WBC # BLD: 12.01 K/UL — HIGH (ref 4.8–10.8)
WBC # FLD AUTO: 12.01 K/UL — HIGH (ref 4.8–10.8)
WBC UR QL: SIGNIFICANT CHANGE UP /HPF

## 2018-08-16 RX ORDER — CHLORHEXIDINE GLUCONATE 213 G/1000ML
1 SOLUTION TOPICAL
Qty: 0 | Refills: 0 | Status: DISCONTINUED | OUTPATIENT
Start: 2018-08-16 | End: 2018-08-17

## 2018-08-16 RX ORDER — WARFARIN SODIUM 2.5 MG/1
2 TABLET ORAL ONCE
Qty: 0 | Refills: 0 | Status: COMPLETED | OUTPATIENT
Start: 2018-08-16 | End: 2018-08-16

## 2018-08-16 RX ADMIN — WARFARIN SODIUM 2 MILLIGRAM(S): 2.5 TABLET ORAL at 21:33

## 2018-08-16 RX ADMIN — Medication 650 MILLIGRAM(S): at 00:28

## 2018-08-16 RX ADMIN — Medication 25 MILLIGRAM(S): at 08:02

## 2018-08-16 RX ADMIN — Medication 250 MILLIGRAM(S): at 05:43

## 2018-08-16 RX ADMIN — Medication 3 MILLIGRAM(S): at 13:09

## 2018-08-16 RX ADMIN — PANTOPRAZOLE SODIUM 40 MILLIGRAM(S): 20 TABLET, DELAYED RELEASE ORAL at 13:09

## 2018-08-16 RX ADMIN — Medication 10 MILLIGRAM(S): at 05:47

## 2018-08-16 RX ADMIN — MONTELUKAST 10 MILLIGRAM(S): 4 TABLET, CHEWABLE ORAL at 21:33

## 2018-08-16 RX ADMIN — TACROLIMUS 0.5 MILLIGRAM(S): 5 CAPSULE ORAL at 17:55

## 2018-08-16 RX ADMIN — HEPARIN SODIUM 11 UNIT(S)/HR: 5000 INJECTION INTRAVENOUS; SUBCUTANEOUS at 21:34

## 2018-08-16 RX ADMIN — Medication 25 MILLIGRAM(S): at 17:56

## 2018-08-16 RX ADMIN — TACROLIMUS 0.5 MILLIGRAM(S): 5 CAPSULE ORAL at 05:47

## 2018-08-16 RX ADMIN — Medication 20 MILLIGRAM(S): at 05:47

## 2018-08-16 RX ADMIN — Medication 250 MILLIGRAM(S): at 21:33

## 2018-08-16 RX ADMIN — AMLODIPINE BESYLATE 5 MILLIGRAM(S): 2.5 TABLET ORAL at 05:47

## 2018-08-16 NOTE — PROGRESS NOTE ADULT - SUBJECTIVE AND OBJECTIVE BOX
CHIEF COMPLAINT:  Patient is a 73y old Female who presents with a chief complaint of left  foot blister (13 Aug 2018 03:39)    Currently admitted to medicine with the primary diagnosis of Necrosis of skin or subcutaneous tissue     Today is hospital day 3d. This morning she is resting comfortably in bed and reports no new issues or overnight events.     PAST MEDICAL & SURGICAL HISTORY  Autoimmune hepatitis  Other chronic pulmonary embolism without acute cor pulmonale  Essential hypertension  Autoimmune hepatitis treated with steroids  Asthma  DVT (deep venous thrombosis)  No significant past surgical history    SOCIAL HISTORY:  Negative for smoking/alcohol/drug use.     ALLERGIES:  No Known Allergies    MEDICATIONS:  STANDING MEDICATIONS  amLODIPine   Tablet 5 milliGRAM(s) Oral daily  buDESOnide   EC Oral Capsule - Peds 3 milliGRAM(s) Oral daily  furosemide    Tablet 20 milliGRAM(s) Oral daily  heparin  Infusion 1300 Unit(s)/Hr IV Continuous <Continuous>  metoprolol tartrate 25 milliGRAM(s) Oral two times a day  montelukast 10 milliGRAM(s) Oral at bedtime  pantoprazole    Tablet 40 milliGRAM(s) Oral before breakfast  predniSONE   Tablet 10 milliGRAM(s) Oral daily  tacrolimus 0.5 milliGRAM(s) Oral two times a day  tiotropium 18 MICROgram(s) Capsule 1 Capsule(s) Inhalation daily  vancomycin  IVPB 1000 milliGRAM(s) IV Intermittent every 12 hours  warfarin 2 milliGRAM(s) Oral once    PRN MEDICATIONS  acetaminophen   Tablet. 650 milliGRAM(s) Oral every 6 hours PRN  ALBUTerol    90 MICROgram(s) HFA Inhaler 1 Puff(s) Inhalation every 4 hours PRN  chlorhexidine 4% Liquid 1 Application(s) Topical <User Schedule> PRN  oxyCODONE    5 mG/acetaminophen 325 mG 1 Tablet(s) Oral every 6 hours PRN  oxyCODONE    5 mG/acetaminophen 325 mG 2 Tablet(s) Oral every 6 hours PRN    VITALS:   T(F): 96  HR: 68  BP: 174/81  RR: 18  SpO2: --    LABS:             12.1   12.01 )-----------( 283      ( 16 Aug 2018 05:12 )             38.7     08-16    145  |  105  |  13  ----------------------------<  88  3.5   |  25  |  0.9    Ca    8.6      16 Aug 2018 05:12    TPro  5.2<L>  /  Alb  3.1<L>  /  TBili  0.5  /  DBili  x   /  AST  15  /  ALT  20  /  AlkPhos  107  08-16    PT/INR - ( 16 Aug 2018 05:12 )   PT: 15.20 sec;   INR: 1.41 ratio    PTT - ( 16 Aug 2018 05:12 )  PTT:98.4 sec    Culture - Surgical Swab (collected 14 Aug 2018 13:12)  Source: .Surgical Swab WOUND LEFT LOWER LEG  Preliminary Report (15 Aug 2018 21:20):    Moderate Staphylococcus aureus    Culture - Surgical Swab (collected 14 Aug 2018 13:12)  Source: .Surgical Swab WOUND LEFT LOWER LEG  Preliminary Report (15 Aug 2018 21:27):    Numerous Staphylococcus aureus    See previous culture [id number]    RADIOLOGY:  < from: Xray Tibia + Fibula 2 Views, Left (08.12.18 @ 14:52) >  Soft tissue swelling  < end of copied text >    < from: Xray Chest 2 Views PA/Lat (08.12.18 @ 12:20) >  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  < end of copied text >    < from: [facility] Duplex Lower Ext Vein Scan, Bilat (08.13.18 @ 08:12) >  No evidence of deep venous thrombosis in the bilateral lower extremities.  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  < end of copied text >    < from: Transthoracic Echocardiogram (03.06.18 @ 14:42) >   1. LV Ejection Fraction by Caballero's Method with a biplane EF of 69 %.   2. Normal left ventricular size and wall thicknesses, with normal   systolic and diastolic function.   3. The mean global longitudinal strain by speckle tracking is -19.8%   which is a normal value.   4. Estimated pulmonary artery systolic pressure is 43.4 mmHg assuming a right atrial pressure of 3 mmHg, which is consistent with mild pulmonary   hypertension.   5. Pulmonary hypertension is present.   6. LA volume Index is 20.3 ml/m² ml/m2.  < end of copied text >    PHYSICAL EXAM:  GEN: No acute distress  PULM: Clear to auscultation bilaterally   CARD: S1/S2 present. RRR.   GI: Soft, non-tender, non-distended. Bowel sounds present  SKIN: Legs wrapped, clean and dry b/l. Dark purple macular rash spread diffusely, one noted on left chin.  NEURO: AAOX3

## 2018-08-16 NOTE — CONSULT NOTE ADULT - ASSESSMENT
IMPRESSION:  Hematoma LLE with excellent debridement with no evidence of ongoing infection.  No abscess or cellulitis.    RECOMMENDATIONS:  Continue with Vancomycin for 24h.  On discharge see no need for antibiotics.    Recall prn please.

## 2018-08-16 NOTE — PROGRESS NOTE ADULT - ASSESSMENT
74 yo female with AIH, DVT/PE presented for left leg erupting skin lesion.    # Left leg necrotic skin lesion  - c/w vancomycin  - Burn recommended debridement -did yesterday; -under local; tolerated well  - f/u u/a, u cx and bl cx;  - ask ID to advise on Antibiotic  - Case discussed with RONA to get Derm consult, and call ID again    # scattered ecchymotic rash:  - non blanching ecchymosis in multiple skin sites  - warfarin toxicity less likely ( INR wnl)  - possible vasculitis, f/u ESR  - Derm consult-see above    # DVT/PE:  - on chronic coumadin  - f/u AM INR and  start heparin gtt if INR<2  - f/u LE duplex  - need to restart AC ASAP-burn to clear ;   - IV heparin, with coumadin restart-OK w Burn    # Autoimmune hepatitis:  - f/u tacrolimus level  - c/w tacrolimus, budesonide, prednisone  - no signs of active disease    # DVT PPX: warfarin/heparin  Full code; hope for DC soon with full AC

## 2018-08-16 NOTE — CONSULT NOTE ADULT - EXTREMITIES COMMENTS
LLE laterally distally an ulcer with clean borders with no erythema/ purulence/ drainage. Good clean base.

## 2018-08-16 NOTE — PROGRESS NOTE ADULT - ASSESSMENT
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 She reports seeing a blister the color of beets and it then developed a black center that grew in size over 24 hours then burst spontanuously and started oozing blood. She had multiple small purple spots appear on her left jaw, bilateral forearms, left elbow and arm. She denies f/c, n/v/d, dysuria, lower back pain.  She was evaluated in [facility] ED and sent for burn evaluation.    Of note, she was admitted in february for right leg blister care and complicated by AHRF 2/2 fungal PNA.    In ED she received 1 dose of vanomycin. (13 Aug 2018 03:39)    # Left leg necrotic skin lesion. leukocytosis, lacatatemia, left leg swelling. s/p fluid challenge. lactate 3 > 2 per ED. s/p debridement by burn.  - r/o cellulitis vs progression of DVT vs warfarin necrosis (unlikely since patient has taken med for a long time)  - c/w vancomycin  - f/u ua, wound cx, wound path    # scattered ecchymotic rash:  - non blanching ecchymosis in multiple skin sites  - warfarin toxicity less likely ( INR wnl)  - possible vasculitis  - f/u ESR  - f/u Derm, ID    # DVT/PE:  - on chronic coumadin  - f/u AM INR and  start heparin gtt if INR<2  - f/u LE dupplex    # Autoimmune hepatitis:  - f/u tacrolimus level  - c/w tacrolimus, budesonide, prednisone  - no signs of active disease    # DVT PPX: warfarin/heparin  Full code 74 yo female PMH autoimmune hepatitis, HTN, DVT/PE on coumadin 2/2 prothrombin gene mutation, asthma presented for new onset left leg blister that started on 4 days ago. She reports seeing a blister the color of beets and it then developed a black center that grew in size over 24 hours then burst spontanuously and started oozing blood. She had multiple small purple spots appear on her left jaw, bilateral forearms, left elbow and arm. She denies f/c, n/v/d, dysuria, lower back pain.  She was evaluated in [facility] ED and sent for burn evaluation.    Of note, she was admitted in february for right leg blister care and complicated by AHRF 2/2 fungal PNA.    In ED she received 1 dose of vanomycin. (13 Aug 2018 03:39)    # Left leg necrotic skin lesion. leukocytosis, lacatatemia, left leg swelling. s/p fluid challenge. lactate 3 > 2 per ED. s/p debridement by burn.  - r/o cellulitis vs progression of DVT vs warfarin necrosis (unlikely since patient has taken med for a long time)  - c/w vancomycin  - f/u ua, wound cx, wound path    # scattered ecchymotic rash:  - non blanching ecchymosis in multiple skin sites  - warfarin toxicity less likely ( INR wnl)  - possible vasculitis  - f/u ESR  - f/u Derm, ID    # DVT/PE: On chronic coumadin. Per [facility] duplex, chronic thrombus noted in the left gastrocnemius vein.   - f/u AM INR, bridging to heparin - on heparin gtt    # Autoimmune hepatitis:  - f/u tacrolimus level  - c/w tacrolimus, budesonide, prednisone  - no signs of active disease    # DVT PPX: warfarin/heparin  Full code

## 2018-08-16 NOTE — CONSULT NOTE ADULT - SUBJECTIVE AND OBJECTIVE BOX
ELDER, DONI  73y, Female  Allergy: No Known Allergies      HPI:  74 yo female PMH autoimmune hepatitis, HTN, DVT/PE on coumadin 2/2 prothrombin gene mutation, asthma presented for new onset left leg blister that started on 4 days ago. She reports seeing a blister the color of beets and it then developed a black center that grew in size over 24 hours then burst spontanuously and started oozing blood. She had multiple small purple spots appear on her left jaw, bilateral forearms, left elbow and arm. She denies f/c, n/v/d, dysuria, lower back pain.  She was evaluated in [facility] ED and sent for burn evaluation.    Of note, she was admitted in february for right leg blister care and complicated by AHRF 2/2 fungal PNA.--Was complicated by CDiff, alveolar hemorrhage, and deconditioning requiring 1 month of SNF rehab;    In ED she received 1 dose of vancomycin (13 Aug 2018 03:39)    FAMILY HISTORY:  No pertinent family history in first degree relatives    PAST MEDICAL & SURGICAL HISTORY:  Autoimmune hepatitis  Other chronic pulmonary embolism without acute cor pulmonale  Essential hypertension  Autoimmune hepatitis treated with steroids  Asthma  DVT (deep venous thrombosis)  No significant past surgical history        VITALS:  T(F): 96, Max: 97.3 (08-16-18 @ 00:00)  HR: 68  BP: 174/81  RR: 18Vital Signs Last 24 Hrs  T(C): 35.6 (16 Aug 2018 07:38), Max: 36.3 (16 Aug 2018 00:00)  T(F): 96 (16 Aug 2018 07:38), Max: 97.3 (16 Aug 2018 00:00)  HR: 68 (16 Aug 2018 07:38) (68 - 72)  BP: 174/81 (16 Aug 2018 07:38) (158/90 - 174/81)  BP(mean): --  RR: 18 (16 Aug 2018 07:38) (18 - 20)  SpO2: --    TESTS & MEASUREMENTS:                        12.1   12.01 )-----------( 283      ( 16 Aug 2018 05:12 )             38.7     08-16    145  |  105  |  13  ----------------------------<  88  3.5   |  25  |  0.9    Ca    8.6      16 Aug 2018 05:12    TPro  5.2<L>  /  Alb  3.1<L>  /  TBili  0.5  /  DBili  x   /  AST  15  /  ALT  20  /  AlkPhos  107  08-16    LIVER FUNCTIONS - ( 16 Aug 2018 05:12 )  Alb: 3.1 g/dL / Pro: 5.2 g/dL / ALK PHOS: 107 U/L / ALT: 20 U/L / AST: 15 U/L / GGT: x             Culture - Surgical Swab (collected 08-14-18 @ 13:12)  Source: .Surgical Swab WOUND LEFT LOWER LEG  Preliminary Report (08-15-18 @ 21:20):    Moderate Staphylococcus aureus    Culture - Surgical Swab (collected 08-14-18 @ 13:12)  Source: .Surgical Swab WOUND LEFT LOWER LEG  Preliminary Report (08-15-18 @ 21:27):    Numerous Staphylococcus aureus    See previous culture [id number]    Culture - Blood (collected 08-13-18 @ 00:48)  Source: .Blood Blood  Preliminary Report (08-14-18 @ 12:01):    No growth to date.    Culture - Blood (collected 08-13-18 @ 00:48)  Source: .Blood Blood  Preliminary Report (08-14-18 @ 12:01):    No growth to date.            RADIOLOGY & ADDITIONAL TESTS:    ANTIBIOTICS:  vancomycin  IVPB 1000 milliGRAM(s) IV Intermittent every 12 hours

## 2018-08-17 ENCOUNTER — INBOUND DOCUMENT (OUTPATIENT)
Age: 73
End: 2018-08-17

## 2018-08-17 ENCOUNTER — TRANSCRIPTION ENCOUNTER (OUTPATIENT)
Age: 73
End: 2018-08-17

## 2018-08-17 VITALS
RESPIRATION RATE: 18 BRPM | SYSTOLIC BLOOD PRESSURE: 145 MMHG | HEART RATE: 75 BPM | DIASTOLIC BLOOD PRESSURE: 68 MMHG | TEMPERATURE: 97 F

## 2018-08-17 LAB
ALBUMIN SERPL ELPH-MCNC: 3 G/DL — LOW (ref 3.5–5.2)
ALP SERPL-CCNC: 99 U/L — SIGNIFICANT CHANGE UP (ref 30–115)
ALT FLD-CCNC: 17 U/L — SIGNIFICANT CHANGE UP (ref 0–41)
ANION GAP SERPL CALC-SCNC: 20 MMOL/L — HIGH (ref 7–14)
APTT BLD: 126.2 SEC — CRITICAL HIGH (ref 27–39.2)
APTT BLD: 85.7 SEC — CRITICAL HIGH (ref 27–39.2)
APTT BLD: 95.9 SEC — CRITICAL HIGH (ref 27–39.2)
AST SERPL-CCNC: 15 U/L — SIGNIFICANT CHANGE UP (ref 0–41)
BASOPHILS # BLD AUTO: 0.05 K/UL — SIGNIFICANT CHANGE UP (ref 0–0.2)
BASOPHILS NFR BLD AUTO: 0.3 % — SIGNIFICANT CHANGE UP (ref 0–1)
BILIRUB SERPL-MCNC: 0.4 MG/DL — SIGNIFICANT CHANGE UP (ref 0.2–1.2)
BUN SERPL-MCNC: 20 MG/DL — SIGNIFICANT CHANGE UP (ref 10–20)
CALCIUM SERPL-MCNC: 8.5 MG/DL — SIGNIFICANT CHANGE UP (ref 8.5–10.1)
CHLORIDE SERPL-SCNC: 104 MMOL/L — SIGNIFICANT CHANGE UP (ref 98–110)
CO2 SERPL-SCNC: 26 MMOL/L — SIGNIFICANT CHANGE UP (ref 17–32)
CREAT SERPL-MCNC: 1.2 MG/DL — SIGNIFICANT CHANGE UP (ref 0.7–1.5)
CRP SERPL-MCNC: 0.95 MG/DL — HIGH (ref 0–0.4)
EOSINOPHIL # BLD AUTO: 0.07 K/UL — SIGNIFICANT CHANGE UP (ref 0–0.7)
EOSINOPHIL NFR BLD AUTO: 0.5 % — SIGNIFICANT CHANGE UP (ref 0–8)
ERYTHROCYTE [SEDIMENTATION RATE] IN BLOOD: 39 MM/HR — HIGH (ref 0–20)
GLUCOSE SERPL-MCNC: 109 MG/DL — HIGH (ref 70–99)
HCT VFR BLD CALC: 42.1 % — SIGNIFICANT CHANGE UP (ref 37–47)
HGB BLD-MCNC: 13.2 G/DL — SIGNIFICANT CHANGE UP (ref 12–16)
IMM GRANULOCYTES NFR BLD AUTO: 0.9 % — HIGH (ref 0.1–0.3)
INR BLD: 1.48 RATIO — HIGH (ref 0.65–1.3)
INR BLD: 1.61 RATIO — HIGH (ref 0.65–1.3)
LACTATE SERPL-SCNC: 1.5 MMOL/L — SIGNIFICANT CHANGE UP (ref 0.5–2.2)
LYMPHOCYTES # BLD AUTO: 13.8 % — LOW (ref 20.5–51.1)
LYMPHOCYTES # BLD AUTO: 2.04 K/UL — SIGNIFICANT CHANGE UP (ref 1.2–3.4)
MCHC RBC-ENTMCNC: 25.8 PG — LOW (ref 27–31)
MCHC RBC-ENTMCNC: 31.4 G/DL — LOW (ref 32–37)
MCV RBC AUTO: 82.2 FL — SIGNIFICANT CHANGE UP (ref 81–99)
MONOCYTES # BLD AUTO: 0.61 K/UL — HIGH (ref 0.1–0.6)
MONOCYTES NFR BLD AUTO: 4.1 % — SIGNIFICANT CHANGE UP (ref 1.7–9.3)
NEUTROPHILS # BLD AUTO: 11.85 K/UL — HIGH (ref 1.4–6.5)
NEUTROPHILS NFR BLD AUTO: 80.4 % — HIGH (ref 42.2–75.2)
NRBC # BLD: 0 /100 WBCS — SIGNIFICANT CHANGE UP (ref 0–0)
PLATELET # BLD AUTO: 291 K/UL — SIGNIFICANT CHANGE UP (ref 130–400)
POTASSIUM SERPL-MCNC: 3.3 MMOL/L — LOW (ref 3.5–5)
POTASSIUM SERPL-SCNC: 3.3 MMOL/L — LOW (ref 3.5–5)
PROT SERPL-MCNC: 5.2 G/DL — LOW (ref 6–8)
PROTHROM AB SERPL-ACNC: 15.9 SEC — HIGH (ref 9.95–12.87)
PROTHROM AB SERPL-ACNC: 17.3 SEC — HIGH (ref 9.95–12.87)
RBC # BLD: 5.12 M/UL — SIGNIFICANT CHANGE UP (ref 4.2–5.4)
RBC # FLD: 17.1 % — HIGH (ref 11.5–14.5)
SODIUM SERPL-SCNC: 150 MMOL/L — HIGH (ref 135–146)
TACROLIMUS SERPL-MCNC: 8.1 NG/ML — SIGNIFICANT CHANGE UP
WBC # BLD: 14.75 K/UL — HIGH (ref 4.8–10.8)
WBC # FLD AUTO: 14.75 K/UL — HIGH (ref 4.8–10.8)

## 2018-08-17 RX ORDER — POTASSIUM CHLORIDE 20 MEQ
40 PACKET (EA) ORAL EVERY 4 HOURS
Qty: 0 | Refills: 0 | Status: DISCONTINUED | OUTPATIENT
Start: 2018-08-17 | End: 2018-08-17

## 2018-08-17 RX ORDER — SODIUM CHLORIDE 9 MG/ML
1000 INJECTION, SOLUTION INTRAVENOUS
Qty: 0 | Refills: 0 | Status: DISCONTINUED | OUTPATIENT
Start: 2018-08-17 | End: 2018-08-17

## 2018-08-17 RX ORDER — CIPROFLOXACIN LACTATE 400MG/40ML
0 VIAL (ML) INTRAVENOUS
Qty: 0 | Refills: 0 | COMMUNITY

## 2018-08-17 RX ORDER — WARFARIN SODIUM 2.5 MG/1
3 TABLET ORAL ONCE
Qty: 0 | Refills: 0 | Status: DISCONTINUED | OUTPATIENT
Start: 2018-08-17 | End: 2018-08-17

## 2018-08-17 RX ADMIN — Medication 250 MILLIGRAM(S): at 06:41

## 2018-08-17 RX ADMIN — Medication 40 MILLIEQUIVALENT(S): at 13:53

## 2018-08-17 RX ADMIN — Medication 25 MILLIGRAM(S): at 06:44

## 2018-08-17 RX ADMIN — HEPARIN SODIUM 9 UNIT(S)/HR: 5000 INJECTION INTRAVENOUS; SUBCUTANEOUS at 04:29

## 2018-08-17 RX ADMIN — AMLODIPINE BESYLATE 5 MILLIGRAM(S): 2.5 TABLET ORAL at 06:42

## 2018-08-17 RX ADMIN — TIOTROPIUM BROMIDE 1 CAPSULE(S): 18 CAPSULE ORAL; RESPIRATORY (INHALATION) at 08:25

## 2018-08-17 RX ADMIN — SODIUM CHLORIDE 100 MILLILITER(S): 9 INJECTION, SOLUTION INTRAVENOUS at 09:29

## 2018-08-17 RX ADMIN — PANTOPRAZOLE SODIUM 40 MILLIGRAM(S): 20 TABLET, DELAYED RELEASE ORAL at 06:45

## 2018-08-17 RX ADMIN — Medication 20 MILLIGRAM(S): at 06:42

## 2018-08-17 RX ADMIN — Medication 3 MILLIGRAM(S): at 12:36

## 2018-08-17 RX ADMIN — TACROLIMUS 0.5 MILLIGRAM(S): 5 CAPSULE ORAL at 06:42

## 2018-08-17 RX ADMIN — Medication 10 MILLIGRAM(S): at 06:45

## 2018-08-17 NOTE — DISCHARGE NOTE ADULT - CARE PROVIDER_API CALL
Enrike Kang (MD), Plastic Surgery  [address] [place] [address]  Phone: [phone]  Fax: [phone]

## 2018-08-17 NOTE — DISCHARGE NOTE ADULT - PATIENT PORTAL LINK FT
You can access the [facility] Patient Portal, offered by [facility], by registering with the following website: [email or web address]

## 2018-08-17 NOTE — DISCHARGE NOTE ADULT - OTHER SIGNIFICANT FINDINGS
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  < end of copied text >    < from: VA Duplex Lower Ext Vein Scan, Bilat (08.13.18 @ 08:12) >  No evidence of deep venous thrombosis in the bilateral lower extremities.  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  < end of copied text >    < from: Transthoracic Echocardiogram (03.06.18 @ 14:42) >   1. LV Ejection Fraction by Caballero's Method with a biplane EF of 69 %.   2. Normal left ventricular size and wall thicknesses, with normal   systolic and diastolic function.   3. The mean global longitudinal strain by speckle tracking is -19.8%   which is a normal value.   4. Estimated pulmonary artery systolic pressure is 43.4 mmHg assuming a right atrial pressure of 3 mmHg, which is consistent with mild pulmonary   hypertension.   5. Pulmonary hypertension is present.   6. LA volume Index is 20.3 ml/m² ml/m2.  < end of copied text >

## 2018-08-17 NOTE — CONSULT NOTE ADULT - SUBJECTIVE AND OBJECTIVE BOX
Patient is a 73y old  Female who presents with a chief complaint of left  foot blister (17 Aug 2018 09:50)      HPI:  72 yo female PMH autoimmune hepatitis, HTN, DVT/PE on coumadin 2/2 prothrombin gene mutation, asthma presented for new onset left leg blister that started on 4 days ago. She reports seeing a blister the color of beets and it then developed a black center that grew in size over 24 hours then burst spontanuously and started oozing blood. She had multiple small purple spots appear on her left jaw, bilateral forearms, left elbow and arm. She denies f/c, n/v/d, dysuria, lower back pain.  She was evaluated in [facility] ED and sent for burn evaluation.    Of note, she was admitted in february for right leg blister care and complicated by AHRF 2/2 fungal PNA.--Was complicated by CDiff, alveolar hemorrhage, and deconditioning requiring 1 month of SNF rehab;    In ED she received 1 dose of vancomycin (13 Aug 2018 03:39)  derm cs called in for echymosis on the jaw      PAST MEDICAL & SURGICAL HISTORY:  Autoimmune hepatitis  Other chronic pulmonary embolism without acute cor pulmonale  Essential hypertension  Autoimmune hepatitis treated with steroids  Asthma  DVT (deep venous thrombosis)  No significant past surgical history    FAMILY HISTORY:  No pertinent family history in first degree relatives    PHYSICAL EXAM  Vital Signs Last 24 Hrs  T(C): 36.1 (17 Aug 2018 07:58), Max: 36.4 (17 Aug 2018 00:00)  T(F): 97 (17 Aug 2018 07:58), Max: 97.5 (17 Aug 2018 00:00)  HR: 75 (17 Aug 2018 07:58) (75 - 102)  BP: 145/68 (17 Aug 2018 07:58) (141/67 - 145/68)  BP(mean): --  RR: 18 (17 Aug 2018 07:58) (18 - 18)  SpO2: --    General: In NAD  Skin: echymosis on the left jaw; pustular papules all around neck, back, legs (diffuse); right arm and right leg fissured plaque        LABS:                          12.1   12.01 )-----------( 283      ( 16 Aug 2018 05:12 )             38.7                                               08-17    150<H>  |  104  |  20  ----------------------------<  109<H>  3.3<L>   |  26  |  1.2    Ca    8.5      17 Aug 2018 07:25    TPro  5.2<L>  /  Alb  3.0<L>  /  TBili  0.4  /  DBili  x   /  AST  15  /  ALT  17  /  AlkPhos  99  08-17      PT/INR - ( 17 Aug 2018 07:25 )   PT: 17.30 sec;   INR: 1.61 ratio         PTT - ( 17 Aug 2018 07:25 )  PTT:85.7 sec                                       Urinalysis Basic - ( 16 Aug 2018 16:40 )    Color: Yellow / Appearance: Cloudy / S.020 / pH: x  Gluc: x / Ketone: Negative  / Bili: Negative / Urobili: 0.2 mg/dL   Blood: x / Protein: Negative mg/dL / Nitrite: Negative   Leuk Esterase: Small / RBC: x / WBC 3-5 /HPF   Sq Epi: x / Non Sq Epi: Few /HPF / Bacteria: x                                                  LIVER FUNCTIONS - ( 17 Aug 2018 07:25 )  Alb: 3.0 g/dL / Pro: 5.2 g/dL / ALK PHOS: 99 U/L / ALT: 17 U/L / AST: 15 U/L / GGT: x                                                  Culture - Surgical Swab (collected 14 Aug 2018 13:12)  Source: .Surgical Swab WOUND LEFT LOWER LEG  Preliminary Report (16 Aug 2018 21:39):    Moderate Methicillin resistant Staphylococcus aureus  Organism: Methicillin resistant Staphylococcus aureus (16 Aug 2018 21:38)  Organism: Methicillin resistant Staphylococcus aureus (16 Aug 2018 21:38)    Culture - Surgical Swab (collected 14 Aug 2018 13:12)  Source: .Surgical Swab WOUND LEFT LOWER LEG  Preliminary Report (16 Aug 2018 21:39):    Numerous Methicillin resistant Staphylococcus aureus    See previous culture [id number]                                                    MEDICATIONS  (STANDING):  amLODIPine   Tablet 5 milliGRAM(s) Oral daily  buDESOnide   EC Oral Capsule - Peds 3 milliGRAM(s) Oral daily  dextrose 5%. 1000 milliLiter(s) (100 mL/Hr) IV Continuous <Continuous>  furosemide    Tablet 20 milliGRAM(s) Oral daily  heparin  Infusion 1300 Unit(s)/Hr (9 mL/Hr) IV Continuous <Continuous>  metoprolol tartrate 25 milliGRAM(s) Oral two times a day  montelukast 10 milliGRAM(s) Oral at bedtime  pantoprazole    Tablet 40 milliGRAM(s) Oral before breakfast  potassium chloride   Powder 40 milliEquivalent(s) Oral every 4 hours  predniSONE   Tablet 10 milliGRAM(s) Oral daily  tacrolimus 0.5 milliGRAM(s) Oral two times a day  tiotropium 18 MICROgram(s) Capsule 1 Capsule(s) Inhalation daily  vancomycin  IVPB 1000 milliGRAM(s) IV Intermittent every 12 hours  warfarin 3 milliGRAM(s) Oral once    MEDICATIONS  (PRN):  acetaminophen   Tablet. 650 milliGRAM(s) Oral every 6 hours PRN Mild Pain (1 - 3)  ALBUTerol    90 MICROgram(s) HFA Inhaler 1 Puff(s) Inhalation every 4 hours PRN Shortness of Breath  chlorhexidine 4% Liquid 1 Application(s) Topical <User Schedule> PRN as needed  oxyCODONE    5 mG/acetaminophen 325 mG 1 Tablet(s) Oral every 6 hours PRN Moderate Pain (4 - 6)  oxyCODONE    5 mG/acetaminophen 325 mG 2 Tablet(s) Oral every 6 hours PRN Severe Pain (7 - 10) Patient is a 73y old  Female who presents with a chief complaint of left  foot blister (17 Aug 2018 09:50)      HPI:  72 yo female PMH autoimmune hepatitis, HTN, DVT/PE on coumadin 2/2 prothrombin gene mutation, asthma presented for new onset left leg blister that started on 4 days ago. She reports seeing a blister the color of beets and it then developed a black center that grew in size over 24 hours then burst spontanuously and started oozing blood. She had multiple small purple spots appear on her left jaw, bilateral forearms, left elbow and arm. She denies f/c, n/v/d, dysuria, lower back pain.  She was evaluated in [facility] ED and sent for burn evaluation.    Of note, she was admitted in february for right leg blister care and complicated by AHRF 2/2 fungal PNA.--Was complicated by CDiff, alveolar hemorrhage, and deconditioning requiring 1 month of SNF rehab;    In ED she received 1 dose of vancomycin (13 Aug 2018 03:39)  derm cs called in for echymosis on the jaw      PAST MEDICAL & SURGICAL HISTORY:  Autoimmune hepatitis  Other chronic pulmonary embolism without acute cor pulmonale  Essential hypertension  Autoimmune hepatitis treated with steroids  Asthma  DVT (deep venous thrombosis)  No significant past surgical history    FAMILY HISTORY:  No pertinent family history in first degree relatives    PHYSICAL EXAM  Vital Signs Last 24 Hrs  T(C): 36.1 (17 Aug 2018 07:58), Max: 36.4 (17 Aug 2018 00:00)  T(F): 97 (17 Aug 2018 07:58), Max: 97.5 (17 Aug 2018 00:00)  HR: 75 (17 Aug 2018 07:58) (75 - 102)  BP: 145/68 (17 Aug 2018 07:58) (141/67 - 145/68)  BP(mean): --  RR: 18 (17 Aug 2018 07:58) (18 - 18)  SpO2: --    General: In NAD  Skin: echymosis on the left jaw; multiple milia on the neck and upper trunk;  brown verrucoid plaque right upper arm and anterior thigh  r      LABS:                          12.1   12.01 )-----------( 283      ( 16 Aug 2018 05:12 )             38.7                                               08-17    150<H>  |  104  |  20  ----------------------------<  109<H>  3.3<L>   |  26  |  1.2    Ca    8.5      17 Aug 2018 07:25    TPro  5.2<L>  /  Alb  3.0<L>  /  TBili  0.4  /  DBili  x   /  AST  15  /  ALT  17  /  AlkPhos  99  08-17      PT/INR - ( 17 Aug 2018 07:25 )   PT: 17.30 sec;   INR: 1.61 ratio         PTT - ( 17 Aug 2018 07:25 )  PTT:85.7 sec                                       Urinalysis Basic - ( 16 Aug 2018 16:40 )    Color: Yellow / Appearance: Cloudy / S.020 / pH: x  Gluc: x / Ketone: Negative  / Bili: Negative / Urobili: 0.2 mg/dL   Blood: x / Protein: Negative mg/dL / Nitrite: Negative   Leuk Esterase: Small / RBC: x / WBC 3-5 /HPF   Sq Epi: x / Non Sq Epi: Few /HPF / Bacteria: x                                                  LIVER FUNCTIONS - ( 17 Aug 2018 07:25 )  Alb: 3.0 g/dL / Pro: 5.2 g/dL / ALK PHOS: 99 U/L / ALT: 17 U/L / AST: 15 U/L / GGT: x                                                  Culture - Surgical Swab (collected 14 Aug 2018 13:12)  Source: .Surgical Swab WOUND LEFT LOWER LEG  Preliminary Report (16 Aug 2018 21:39):    Moderate Methicillin resistant Staphylococcus aureus  Organism: Methicillin resistant Staphylococcus aureus (16 Aug 2018 21:38)  Organism: Methicillin resistant Staphylococcus aureus (16 Aug 2018 21:38)    Culture - Surgical Swab (collected 14 Aug 2018 13:12)  Source: .Surgical Swab WOUND LEFT LOWER LEG  Preliminary Report (16 Aug 2018 21:39):    Numerous Methicillin resistant Staphylococcus aureus    See previous culture [id number]                                                    MEDICATIONS  (STANDING):  amLODIPine   Tablet 5 milliGRAM(s) Oral daily  buDESOnide   EC Oral Capsule - Peds 3 milliGRAM(s) Oral daily  dextrose 5%. 1000 milliLiter(s) (100 mL/Hr) IV Continuous <Continuous>  furosemide    Tablet 20 milliGRAM(s) Oral daily  heparin  Infusion 1300 Unit(s)/Hr (9 mL/Hr) IV Continuous <Continuous>  metoprolol tartrate 25 milliGRAM(s) Oral two times a day  montelukast 10 milliGRAM(s) Oral at bedtime  pantoprazole    Tablet 40 milliGRAM(s) Oral before breakfast  potassium chloride   Powder 40 milliEquivalent(s) Oral every 4 hours  predniSONE   Tablet 10 milliGRAM(s) Oral daily  tacrolimus 0.5 milliGRAM(s) Oral two times a day  tiotropium 18 MICROgram(s) Capsule 1 Capsule(s) Inhalation daily  vancomycin  IVPB 1000 milliGRAM(s) IV Intermittent every 12 hours  warfarin 3 milliGRAM(s) Oral once    MEDICATIONS  (PRN):  acetaminophen   Tablet. 650 milliGRAM(s) Oral every 6 hours PRN Mild Pain (1 - 3)  ALBUTerol    90 MICROgram(s) HFA Inhaler 1 Puff(s) Inhalation every 4 hours PRN Shortness of Breath  chlorhexidine 4% Liquid 1 Application(s) Topical <User Schedule> PRN as needed  oxyCODONE    5 mG/acetaminophen 325 mG 1 Tablet(s) Oral every 6 hours PRN Moderate Pain (4 - 6)  oxyCODONE    5 mG/acetaminophen 325 mG 2 Tablet(s) Oral every 6 hours PRN Severe Pain (7 - 10)

## 2018-08-17 NOTE — DISCHARGE NOTE ADULT - PLAN OF CARE
Surgical debridement and outpatient follow-up Please continue to take medication as prescribed. Arrange follow-up with burn (Dr. Kang) for within one week. Arrange follow-up with your primary care provider for within two weeks. If you experience any worrying symptoms such as difficulty breathing, syncope, chest pain, worsening rash, fevers, or chills, please contact emergency personnel as soon as possible. Medical management and outpatient follow-up Please take two 2mg tabs tonight and continue to take one 2mg tab following nights. Follow-up with coumadin clinic as soon as possible.

## 2018-08-17 NOTE — DISCHARGE NOTE ADULT - HOSPITAL COURSE
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 She reports seeing a blister the color of beets and it then developed a black center that grew in size over 24 hours then burst spontanuously and started oozing blood. She had multiple small purple spots appear on her left jaw, bilateral forearms, left elbow and arm. She denies f/c, n/v/d, dysuria, lower back pain.  [place] [facility] and [facility] for burn evaluation.    Of note, she was admitted in february for right leg blister care and complicated by AHRF 2/2 fungal PNA.    On admission,  s/p debridement by burn, surgical specimen sent for pathology. s/p vancomycin x 4 days. Pt restarted on warfarin and discharged with close follow-up. Pt agrees with discharge plan.

## 2018-08-17 NOTE — PROGRESS NOTE ADULT - ASSESSMENT
72 yo female with AIH, DVT/PE presented for left leg erupting skin lesion.    # Left leg necrotic skin lesion  - c/w vancomycin-finish w this am dose  - Burn recommended debridement -did on 8/14 -under local; tolerated well  - f/u u/a, u cx and bl cx;  - follow ID to advice on Antibiotic  - Case discussed with HO- Derm consult-noted    # scattered ecchymotic rash:  - non blanching ecchymosis in multiple skin sites  - warfarin toxicity less likely ( INR wnl)  - possible vasculitis, f/u ESR  - Derm consult-see note-ecchymotic    # DVT/PE:  - on chronic coumadin  - f/u AM INR and  start heparin gtt if INR<2  -  LE duplex- chronic calf thrombosis  -  back on AC; will double warfarin dose for today(4mg), then 2mg/D until coumadin Clinic next wk.      # Autoimmune hepatitis:  - f/u tacrolimus level  - c/w tacrolimus, budesonide, prednisone  - no signs of active disease    # DVT on Ac Rx warfarin/heparin  Full code; Case discussed with TONIOK for DC today

## 2018-08-17 NOTE — CONSULT NOTE ADULT - ASSESSMENT
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 Derm cs called in for echymotic rash on left jaw since 10 days; as per pt, it is decreasing and resolving    Assesment-   # left jaw ecchymosis  # milia  # seborrheic keratoses    Plan-   # left jaw ecchymosis- ecchymosis is improving as per patient; will resolve spontaneously  # milia--no rx needed  #seborrheic keratoses--no treatment needed  - f/u with derm OP

## 2018-08-17 NOTE — DISCHARGE NOTE ADULT - MEDICATION SUMMARY - MEDICATIONS TO STOP TAKING
I will STOP taking the medications listed below when I get home from the hospital:    vancomycin 125 mg oral capsule  -- 1 cap(s) by mouth every 6 hours MDD:Take final dose on night of 4/12 to complete course of medication    Cipro

## 2018-08-17 NOTE — DISCHARGE NOTE ADULT - CARE PLAN
Principal Discharge DX:	Necrosis of skin or subcutaneous tissue  Goal:	Surgical debridement and outpatient follow-up  Assessment and plan of treatment:	Please continue to take medication as prescribed. Arrange follow-up with burn (Dr. Kang) for within one week. Arrange follow-up with your primary care provider for within two weeks. If you experience any worrying symptoms such as difficulty breathing, syncope, chest pain, worsening rash, fevers, or chills, please contact emergency personnel as soon as possible.  Secondary Diagnosis:	History of thromboembolism  Goal:	Medical management and outpatient follow-up  Assessment and plan of treatment:	Please take two 2mg tabs tonight and continue to take one 2mg tab following nights. Follow-up with coumadin clinic as soon as possible.

## 2018-08-17 NOTE — DISCHARGE NOTE ADULT - MEDICATION SUMMARY - MEDICATIONS TO TAKE
I will START or STAY ON the medications listed below when I get home from the hospital:    budesonide 3 mg oral capsule, extended release  -- Indication: For Asthma    predniSONE 10 mg oral tablet  -- 1 tab(s) by mouth once a day  -- Indication: For Autoimmune hepatitis    acetaminophen 325 mg oral tablet  -- 2 tab(s) by mouth every 6 hours, As needed, pain  -- Indication: For Pain    warfarin 2 mg oral tablet  -- 1 tab(s) by mouth once a day  -- Indication: For h/o PE/DVT    metoprolol tartrate 25 mg oral tablet  -- 1 tab(s) by mouth 2 times a day  -- Indication: For HTN    tiotropium 18 mcg inhalation capsule  -- 1 cap(s) inhaled once a day  -- Indication: For Asthma    albuterol 90 mcg/inh inhalation aerosol  -- 1 puff(s) inhaled every 4 hours, As Needed for shortness of breath  -- Indication: For Asthma    amLODIPine 5 mg oral tablet  -- 1 tab(s) by mouth once a day  -- Indication: For HTN    bacitracin 500 units/g topical ointment  -- 1 application on skin 2 times a day to affected area rue at time of dressing change  -- Indication: For Rash    hydrocortisone 25 mg rectal suppository  -- 1 suppository(ies) rectally once a day  -- Indication: For Rash    nystatin 100,000 units/g topical powder  -- 1 application on skin every 12 hours to area of skin rash  -- Indication: For Rash    furosemide 20 mg oral tablet  -- Indication: For HTN    tacrolimus 0.5 mg oral capsule  -- 1 cap(s) by mouth once a day (at bedtime)  -- Indication: For Autoimmune hepatitis    Iron 100 Plus  -- Indication: For Health    montelukast 10 mg oral tablet  -- 1 tab(s) by mouth once a day (at bedtime)  -- Indication: For Asthma    pantoprazole 40 mg oral delayed release tablet  -- 1 tab(s) by mouth once a day (before a meal)  -- Indication: For GERD

## 2018-08-17 NOTE — PROGRESS NOTE ADULT - SUBJECTIVE AND OBJECTIVE BOX
Chart reviewed, patient examined. Pertinent results reviewed.  specialist f/u reviewed  HD#6 (ER on 8/12); POD#3    SUBJECTIVE:  Patient is a 73y old Female who presents with a chief complaint of left  foot blister (13 Aug 2018 03:39)    Currently admitted to medicine with the primary diagnosis of Necrosis of skin or subcutaneous tissue   . This morning she is resting comfortably,  POD#3 after debridement of wound by Burn SVC. p/s pain <<prior to  surgery; ID saw pt, feels wound is clean, and last dose of vanco today. I reviewed w Dr Gotti.    PAST MEDICAL & SURGICAL HISTORY  Autoimmune hepatitis  Other chronic pulmonary embolism without acute cor pulmonale- on chronic AC-Warfarin  Essential hypertension  Autoimmune hepatitis treated with steroids  Asthma  DVT (deep venous thrombosis)  No significant past surgical history    SOCIAL HISTORY:  Negative for smoking/alcohol/drug use.     Home Medications:  Home Medications:  acetaminophen 325 mg oral tablet: 2 tab(s) orally every 6 hours, As needed, pain (12 Aug 2018 11:44)  albuterol 90 mcg/inh inhalation aerosol: 1 puff(s) inhaled every 4 hours, As Needed for shortness of breath (12 Aug 2018 11:44)  amLODIPine 5 mg oral tablet: 1 tab(s) orally once a day (12 Aug 2018 11:44)  budesonide 3 mg oral capsule, extended release:  (12 Aug 2018 11:44)  Cipro:  (12 Aug 2018 11:44)  furosemide 20 mg oral tablet:  (12 Aug 2018 11:44)  Iron 100 Plus:  (12 Aug 2018 11:44)  metoprolol tartrate 25 mg oral tablet: 1 tab(s) orally 2 times a day (12 Aug 2018 11:44)  montelukast 10 mg oral tablet: 1 tab(s) orally once a day (at bedtime) (12 Aug 2018 11:44)  pantoprazole 40 mg oral delayed release tablet: 1 tab(s) orally once a day (before a meal) (12 Aug 2018 11:44)  predniSONE 10 mg oral tablet: 1 tab(s) orally once a day (12 Aug 2018 11:44)  tacrolimus 0.5 mg oral capsule: 1 cap(s) orally once a day (at bedtime) (12 Aug 2018 11:44)  tiotropium 18 mcg inhalation capsule: 1 cap(s) inhaled once a day (12 Aug 2018 11:44)  warfarin 2 mg oral tablet: 1 tab(s) orally once a day (12 Aug 2018 11:44)      ALLERGIES:  No Known Allergies    MEDICATIONS:  STANDING MEDICATIONS  amLODIPine   Tablet 5 milliGRAM(s) Oral daily  buDESOnide   EC Oral Capsule - Peds 3 milliGRAM(s) Oral daily  furosemide    Tablet 20 milliGRAM(s) Oral daily  lactated ringers. 1000 milliLiter(s) IV Continuous <Continuous>  metoprolol tartrate 25 milliGRAM(s) Oral two times a day  montelukast 10 milliGRAM(s) Oral at bedtime  pantoprazole    Tablet 40 milliGRAM(s) Oral before breakfast  predniSONE   Tablet 10 milliGRAM(s) Oral daily  tacrolimus 0.5 milliGRAM(s) Oral two times a day  tiotropium 18 MICROgram(s) Capsule 1 Capsule(s) Inhalation daily  vancomycin  IVPB 1000 milliGRAM(s) IV Intermittent every 12 hours    PRN MEDICATIONS  acetaminophen   Tablet. 650 milliGRAM(s) Oral every 6 hours PRN  ALBUTerol    90 MICROgram(s) HFA Inhaler 1 Puff(s) Inhalation every 4 hours PRN  HYDROmorphone  Injectable 0.5 milliGRAM(s) IV Push every 10 minutes PRN  HYDROmorphone  Injectable 1 milliGRAM(s) IV Push once PRN  ondansetron Injectable 4 milliGRAM(s) IV Push once PRN  oxyCODONE    5 mG/acetaminophen 325 mG 2 Tablet(s) Oral once PRN  oxyCODONE    5 mG/acetaminophen 325 mG 1 Tablet(s) Oral every 6 hours PRN  oxyCODONE    5 mG/acetaminophen 325 mG 2 Tablet(s) Oral every 6 hours PRN    Vital Signs Last 24 Hrs  T(C): 36.1 (17 Aug 2018 07:58), Max: 36.4 (17 Aug 2018 00:00)  T(F): 97 (17 Aug 2018 07:58), Max: 97.5 (17 Aug 2018 00:00)  HR: 75 (17 Aug 2018 07:58) (75 - 102)  BP: 145/68 (17 Aug 2018 07:58) (141/67 - 145/68)  BP(mean): --  RR: 18 (17 Aug 2018 07:58) (18 - 18)  SpO2: --    LABS:                          12.1   12.01 )-----------( 283      ( 16 Aug 2018 05:12 )             38.7                       12.6   10.07 )-----------( 285      ( 14 Aug 2018 01:07 )             40.0     08-14    146  |  106  |  17  ----------------------------<  102<H>  3.6   |  26  |  0.9    Ca    8.4<L>      14 Aug 2018 01:07  Phos  3.2     08-14  Mg     1.9     08-14    TPro  5.1<L>  /  Alb  3.0<L>  /  TBili  0.3  /  DBili  x   /  AST  26  /  ALT  43<H>  /  AlkPhos  135<H>  08-13    PT/INR - ( 13 Aug 2018 07:11 )   PT: 23.70 sec;   INR: 2.21 ratio         PTT - ( 13 Aug 2018 07:11 )  PTT:28.9 sec          Culture - Blood (collected 13 Aug 2018 00:48)  Source: .Blood Blood  Preliminary Report (14 Aug 2018 12:01):    No growth to date.    Culture - Blood (collected 13 Aug 2018 00:48)  Source: .Blood Blood  Preliminary Report (14 Aug 2018 12:01):    No growth to date.          RADIOLOGY:    PHYSICAL EXAM:  GEN: No acute distress; looks well, stands easily  LUNGS: Clear to auscultation bilaterally-min crackles L base   HEART: S1/S2 present. RRR.   ABD: Soft, non-tender, non-distended. Bowel sounds present  EXT: Left leg ulcer debrided- now with surgical dressing  NEURO: AAOX3

## 2018-08-18 LAB
CULTURE RESULTS: SIGNIFICANT CHANGE UP
CULTURE RESULTS: SIGNIFICANT CHANGE UP
SPECIMEN SOURCE: SIGNIFICANT CHANGE UP
SPECIMEN SOURCE: SIGNIFICANT CHANGE UP

## 2018-08-20 PROBLEM — I27.82 CHRONIC PULMONARY EMBOLISM: Chronic | Status: ACTIVE | Noted: 2018-08-13

## 2018-08-20 PROBLEM — K75.4 AUTOIMMUNE HEPATITIS: Chronic | Status: ACTIVE | Noted: 2018-08-13

## 2018-08-22 ENCOUNTER — OUTPATIENT (OUTPATIENT)
Dept: OUTPATIENT SERVICES | Facility: HOSPITAL | Age: 73
LOS: 1 days | Discharge: HOME | End: 2018-08-22

## 2018-08-22 DIAGNOSIS — Z79.01 LONG TERM (CURRENT) USE OF ANTICOAGULANTS: ICD-10-CM

## 2018-08-22 DIAGNOSIS — I27.82 CHRONIC PULMONARY EMBOLISM: ICD-10-CM

## 2018-08-23 ENCOUNTER — OUTPATIENT (OUTPATIENT)
Dept: OUTPATIENT SERVICES | Facility: HOSPITAL | Age: 73
LOS: 1 days | Discharge: HOME | End: 2018-08-23

## 2018-08-23 ENCOUNTER — APPOINTMENT (OUTPATIENT)
Dept: BURN CARE | Facility: CLINIC | Age: 73
End: 2018-08-23
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
## 2018-08-23 DIAGNOSIS — S81.802A UNSPECIFIED OPEN WOUND, LEFT LOWER LEG, INITIAL ENCOUNTER: ICD-10-CM
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
## 2018-08-31 ENCOUNTER — APPOINTMENT (OUTPATIENT)
Dept: VASCULAR SURGERY | Facility: CLINIC | Age: 73
End: 2018-08-31
Payer: MEDICARE

## 2018-08-31 PROCEDURE — 99212 OFFICE O/P EST SF 10 MIN: CPT

## 2018-09-05 ENCOUNTER — OUTPATIENT (OUTPATIENT)
Dept: OUTPATIENT SERVICES | Facility: HOSPITAL | Age: 73
LOS: 1 days | Discharge: HOME | End: 2018-09-05

## 2018-09-05 DIAGNOSIS — Z79.01 LONG TERM (CURRENT) USE OF ANTICOAGULANTS: ICD-10-CM

## 2018-09-05 DIAGNOSIS — I27.82 CHRONIC PULMONARY EMBOLISM: ICD-10-CM

## 2018-09-05 LAB
POCT INR: 3 RATIO — HIGH (ref 0.9–1.2)
POCT PT: 36.2 SEC — HIGH (ref 10–13.4)

## 2018-09-06 ENCOUNTER — OUTPATIENT (OUTPATIENT)
Dept: OUTPATIENT SERVICES | Facility: HOSPITAL | Age: 73
LOS: 1 days | Discharge: HOME | End: 2018-09-06

## 2018-09-06 ENCOUNTER — APPOINTMENT (OUTPATIENT)
Dept: WOUND CARE | Facility: CLINIC | Age: 73
End: 2018-09-06
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
## 2018-09-06 DIAGNOSIS — S81.802A UNSPECIFIED OPEN WOUND, LEFT LOWER LEG, INITIAL ENCOUNTER: ICD-10-CM
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
## 2018-09-19 ENCOUNTER — OUTPATIENT (OUTPATIENT)
Dept: OUTPATIENT SERVICES | Facility: HOSPITAL | Age: 73
LOS: 1 days | Discharge: HOME | End: 2018-09-19

## 2018-09-19 DIAGNOSIS — Z79.01 LONG TERM (CURRENT) USE OF ANTICOAGULANTS: ICD-10-CM

## 2018-09-19 DIAGNOSIS — I27.82 CHRONIC PULMONARY EMBOLISM: ICD-10-CM

## 2018-09-19 LAB
POCT INR: 2.5 RATIO — HIGH (ref 0.9–1.2)
POCT PT: 30.2 SEC — HIGH (ref 10–13.4)

## 2018-09-27 ENCOUNTER — APPOINTMENT (OUTPATIENT)
Dept: WOUND CARE | Facility: CLINIC | Age: 73
End: 2018-09-27

## 2018-10-06 ENCOUNTER — TRANSCRIPTION ENCOUNTER (OUTPATIENT)
Age: 73
End: 2018-10-06

## 2018-10-11 ENCOUNTER — APPOINTMENT (OUTPATIENT)
Dept: BURN CARE | Facility: CLINIC | Age: 73
End: 2018-10-11

## 2018-10-11 ENCOUNTER — OUTPATIENT (OUTPATIENT)
Dept: OUTPATIENT SERVICES | Facility: HOSPITAL | Age: 73
LOS: 1 days | Discharge: HOME | End: 2018-10-11

## 2018-10-17 ENCOUNTER — OUTPATIENT (OUTPATIENT)
Dept: OUTPATIENT SERVICES | Facility: HOSPITAL | Age: 73
LOS: 1 days | Discharge: HOME | End: 2018-10-17

## 2018-10-17 DIAGNOSIS — I27.82 CHRONIC PULMONARY EMBOLISM: ICD-10-CM

## 2018-10-17 DIAGNOSIS — Z79.01 LONG TERM (CURRENT) USE OF ANTICOAGULANTS: ICD-10-CM

## 2018-10-17 LAB
POCT INR: 2.1 RATIO — HIGH (ref 0.9–1.2)
POCT PT: 24.8 SEC — HIGH (ref 10–13.4)

## 2018-10-24 DIAGNOSIS — S81.802A UNSPECIFIED OPEN WOUND, LEFT LOWER LEG, INITIAL ENCOUNTER: ICD-10-CM
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
## 2018-11-01 ENCOUNTER — APPOINTMENT (OUTPATIENT)
Dept: WOUND CARE | Facility: CLINIC | Age: 73
End: 2018-11-01

## 2018-11-05 ENCOUNTER — OUTPATIENT (OUTPATIENT)
Dept: OUTPATIENT SERVICES | Facility: HOSPITAL | Age: 73
LOS: 1 days | Discharge: HOME | End: 2018-11-05

## 2018-11-05 DIAGNOSIS — Z12.31 ENCOUNTER FOR SCREENING MAMMOGRAM FOR MALIGNANT NEOPLASM OF BREAST: ICD-10-CM

## 2018-11-14 ENCOUNTER — OUTPATIENT (OUTPATIENT)
Dept: OUTPATIENT SERVICES | Facility: HOSPITAL | Age: 73
LOS: 1 days | Discharge: HOME | End: 2018-11-14

## 2018-11-14 DIAGNOSIS — Z79.01 LONG TERM (CURRENT) USE OF ANTICOAGULANTS: ICD-10-CM

## 2018-11-14 DIAGNOSIS — I27.82 CHRONIC PULMONARY EMBOLISM: ICD-10-CM

## 2018-11-14 LAB
POCT INR: 1.9 RATIO — HIGH (ref 0.9–1.2)
POCT PT: 22.6 SEC — HIGH (ref 10–13.4)

## 2018-11-28 ENCOUNTER — OUTPATIENT (OUTPATIENT)
Dept: OUTPATIENT SERVICES | Facility: HOSPITAL | Age: 73
LOS: 1 days | Discharge: HOME | End: 2018-11-28

## 2018-11-28 DIAGNOSIS — Z79.01 LONG TERM (CURRENT) USE OF ANTICOAGULANTS: ICD-10-CM

## 2018-11-28 DIAGNOSIS — I27.82 CHRONIC PULMONARY EMBOLISM: ICD-10-CM

## 2018-12-19 ENCOUNTER — OUTPATIENT (OUTPATIENT)
Dept: OUTPATIENT SERVICES | Facility: HOSPITAL | Age: 73
LOS: 1 days | Discharge: HOME | End: 2018-12-19

## 2018-12-19 DIAGNOSIS — I27.82 CHRONIC PULMONARY EMBOLISM: ICD-10-CM

## 2018-12-19 DIAGNOSIS — Z79.01 LONG TERM (CURRENT) USE OF ANTICOAGULANTS: ICD-10-CM

## 2019-01-01 ENCOUNTER — OUTPATIENT (OUTPATIENT)
Dept: OUTPATIENT SERVICES | Facility: HOSPITAL | Age: 74
LOS: 1 days | Discharge: HOME | End: 2019-01-01

## 2019-01-01 ENCOUNTER — TRANSCRIPTION ENCOUNTER (OUTPATIENT)
Age: 74
End: 2019-01-01

## 2019-01-01 ENCOUNTER — RESULT REVIEW (OUTPATIENT)
Age: 74
End: 2019-01-01

## 2019-01-01 ENCOUNTER — INPATIENT (INPATIENT)
Facility: HOSPITAL | Age: 74
LOS: 14 days | Discharge: HOME HEALTH | End: 2019-07-16
Attending: PLASTIC SURGERY | Admitting: PLASTIC SURGERY
Payer: MEDICARE

## 2019-01-01 ENCOUNTER — APPOINTMENT (OUTPATIENT)
Dept: BURN CARE | Facility: CLINIC | Age: 74
End: 2019-01-01
Payer: MEDICARE

## 2019-01-01 ENCOUNTER — APPOINTMENT (OUTPATIENT)
Dept: WOUND CARE | Facility: CLINIC | Age: 74
End: 2019-01-01

## 2019-01-01 ENCOUNTER — EMERGENCY (EMERGENCY)
Facility: HOSPITAL | Age: 74
LOS: 0 days | Discharge: HOME | End: 2019-11-15
Attending: EMERGENCY MEDICINE | Admitting: STUDENT IN AN ORGANIZED HEALTH CARE EDUCATION/TRAINING PROGRAM
Payer: MEDICARE

## 2019-01-01 ENCOUNTER — LABORATORY RESULT (OUTPATIENT)
Age: 74
End: 2019-01-01

## 2019-01-01 ENCOUNTER — APPOINTMENT (OUTPATIENT)
Dept: BURN CARE | Facility: CLINIC | Age: 74
End: 2019-01-01

## 2019-01-01 ENCOUNTER — INPATIENT (INPATIENT)
Facility: HOSPITAL | Age: 74
LOS: 4 days | Discharge: HOME HEALTH | End: 2019-08-17
Attending: INTERNAL MEDICINE | Admitting: INTERNAL MEDICINE
Payer: MEDICARE

## 2019-01-01 ENCOUNTER — INPATIENT (INPATIENT)
Facility: HOSPITAL | Age: 74
LOS: 2 days | Discharge: HOME HEALTH | End: 2019-12-08
Attending: PLASTIC SURGERY | Admitting: PLASTIC SURGERY
Payer: MEDICARE

## 2019-01-01 VITALS
SYSTOLIC BLOOD PRESSURE: 136 MMHG | DIASTOLIC BLOOD PRESSURE: 79 MMHG | OXYGEN SATURATION: 95 % | TEMPERATURE: 98 F | HEART RATE: 124 BPM | RESPIRATION RATE: 20 BRPM

## 2019-01-01 VITALS
DIASTOLIC BLOOD PRESSURE: 72 MMHG | RESPIRATION RATE: 18 BRPM | SYSTOLIC BLOOD PRESSURE: 130 MMHG | TEMPERATURE: 98 F | OXYGEN SATURATION: 100 % | HEART RATE: 80 BPM

## 2019-01-01 VITALS — HEIGHT: 65 IN | WEIGHT: 169.98 LBS

## 2019-01-01 VITALS
OXYGEN SATURATION: 97 % | DIASTOLIC BLOOD PRESSURE: 85 MMHG | TEMPERATURE: 96 F | RESPIRATION RATE: 18 BRPM | HEART RATE: 71 BPM | SYSTOLIC BLOOD PRESSURE: 114 MMHG

## 2019-01-01 VITALS
TEMPERATURE: 97 F | OXYGEN SATURATION: 95 % | SYSTOLIC BLOOD PRESSURE: 135 MMHG | RESPIRATION RATE: 17 BRPM | DIASTOLIC BLOOD PRESSURE: 63 MMHG | HEART RATE: 89 BPM

## 2019-01-01 VITALS
DIASTOLIC BLOOD PRESSURE: 106 MMHG | SYSTOLIC BLOOD PRESSURE: 170 MMHG | OXYGEN SATURATION: 98 % | RESPIRATION RATE: 18 BRPM | HEART RATE: 114 BPM | TEMPERATURE: 98 F

## 2019-01-01 VITALS — DIASTOLIC BLOOD PRESSURE: 80 MMHG | HEART RATE: 69 BPM | SYSTOLIC BLOOD PRESSURE: 137 MMHG | RESPIRATION RATE: 18 BRPM

## 2019-01-01 VITALS — WEIGHT: 192.02 LBS | HEIGHT: 64 IN

## 2019-01-01 DIAGNOSIS — Z02.9 ENCOUNTER FOR ADMINISTRATIVE EXAMINATIONS, UNSPECIFIED: ICD-10-CM

## 2019-01-01 DIAGNOSIS — E87.2 ACIDOSIS: ICD-10-CM
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
## 2019-01-01 DIAGNOSIS — I27.82 CHRONIC PULMONARY EMBOLISM: ICD-10-CM

## 2019-01-01 DIAGNOSIS — E87.6 HYPOKALEMIA: ICD-10-CM

## 2019-01-01 DIAGNOSIS — S81.802A UNSPECIFIED OPEN WOUND, LEFT LOWER LEG, INITIAL ENCOUNTER: ICD-10-CM

## 2019-01-01 DIAGNOSIS — L03.116 CELLULITIS OF LEFT LOWER LIMB: ICD-10-CM

## 2019-01-01 DIAGNOSIS — Z98.890 OTHER SPECIFIED POSTPROCEDURAL STATES: Chronic | ICD-10-CM

## 2019-01-01 DIAGNOSIS — Z79.01 LONG TERM (CURRENT) USE OF ANTICOAGULANTS: ICD-10-CM
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
## 2019-01-01 DIAGNOSIS — S81.809S UNSPECIFIED OPEN WOUND, UNSPECIFIED LOWER LEG, SEQUELA: ICD-10-CM
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
## 2019-01-01 DIAGNOSIS — K75.4 AUTOIMMUNE HEPATITIS: ICD-10-CM

## 2019-01-01 DIAGNOSIS — Z87.891 PERSONAL HISTORY OF NICOTINE DEPENDENCE: ICD-10-CM

## 2019-01-01 DIAGNOSIS — S81.802D UNSPECIFIED OPEN WOUND, LEFT LOWER LEG, SUBSEQUENT ENCOUNTER: ICD-10-CM

## 2019-01-01 DIAGNOSIS — Z86.718 PERSONAL HISTORY OF OTHER VENOUS THROMBOSIS AND EMBOLISM: ICD-10-CM

## 2019-01-01 DIAGNOSIS — S81.012A LACERATION WITHOUT FOREIGN BODY, LEFT KNEE, INITIAL ENCOUNTER: ICD-10-CM

## 2019-01-01 DIAGNOSIS — I10 ESSENTIAL (PRIMARY) HYPERTENSION: ICD-10-CM

## 2019-01-01 DIAGNOSIS — D68.69 OTHER THROMBOPHILIA: ICD-10-CM

## 2019-01-01 DIAGNOSIS — L97.919 NON-PRESSURE CHRONIC ULCER OF UNSPECIFIED PART OF RIGHT LOWER LEG WITH UNSPECIFIED SEVERITY: ICD-10-CM
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
## 2019-01-01 DIAGNOSIS — Z79.52 LONG TERM (CURRENT) USE OF SYSTEMIC STEROIDS: ICD-10-CM

## 2019-01-01 DIAGNOSIS — S81.801A UNSPECIFIED OPEN WOUND, RIGHT LOWER LEG, INITIAL ENCOUNTER: ICD-10-CM

## 2019-01-01 DIAGNOSIS — B95.62 METHICILLIN RESISTANT STAPHYLOCOCCUS AUREUS INFECTION AS THE CAUSE OF DISEASES CLASSIFIED ELSEWHERE: ICD-10-CM

## 2019-01-01 DIAGNOSIS — I26.99 OTHER PULMONARY EMBOLISM W/OUT ACUTE COR PULMONALE: ICD-10-CM

## 2019-01-01 DIAGNOSIS — R11.0 NAUSEA: ICD-10-CM

## 2019-01-01 DIAGNOSIS — S40.811A ABRASION OF RIGHT UPPER ARM, INITIAL ENCOUNTER: ICD-10-CM

## 2019-01-01 DIAGNOSIS — J45.909 UNSPECIFIED ASTHMA, UNCOMPLICATED: ICD-10-CM

## 2019-01-01 DIAGNOSIS — A41.02 SEPSIS DUE TO METHICILLIN RESISTANT STAPHYLOCOCCUS AUREUS: ICD-10-CM

## 2019-01-01 DIAGNOSIS — M79.661 PAIN IN RIGHT LOWER LEG: ICD-10-CM

## 2019-01-01 DIAGNOSIS — M79.89 OTHER SPECIFIED SOFT TISSUE DISORDERS: ICD-10-CM

## 2019-01-01 DIAGNOSIS — T82.534A LEAKAGE OF INFUSION CATHETER, INITIAL ENCOUNTER: ICD-10-CM

## 2019-01-01 DIAGNOSIS — S71.111S: ICD-10-CM

## 2019-01-01 DIAGNOSIS — Z86.711 PERSONAL HISTORY OF PULMONARY EMBOLISM: ICD-10-CM
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
## 2019-01-01 DIAGNOSIS — I96 GANGRENE, NOT ELSEWHERE CLASSIFIED: ICD-10-CM

## 2019-01-01 DIAGNOSIS — L03.115 CELLULITIS OF RIGHT LOWER LIMB: ICD-10-CM

## 2019-01-01 DIAGNOSIS — M25.461 EFFUSION, RIGHT KNEE: ICD-10-CM

## 2019-01-01 DIAGNOSIS — S81.812A LACERATION WITHOUT FOREIGN BODY, LEFT LOWER LEG, INITIAL ENCOUNTER: ICD-10-CM

## 2019-01-01 DIAGNOSIS — E66.9 OBESITY, UNSPECIFIED: ICD-10-CM
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
## 2019-01-01 DIAGNOSIS — Z79.2 LONG TERM (CURRENT) USE OF ANTIBIOTICS: ICD-10-CM

## 2019-01-01 DIAGNOSIS — L02.415 CUTANEOUS ABSCESS OF RIGHT LOWER LIMB: ICD-10-CM

## 2019-01-01 DIAGNOSIS — S81.801D UNSPECIFIED OPEN WOUND, RIGHT LOWER LEG, SUBSEQUENT ENCOUNTER: ICD-10-CM

## 2019-01-01 DIAGNOSIS — R65.20 SEVERE SEPSIS WITHOUT SEPTIC SHOCK: ICD-10-CM

## 2019-01-01 DIAGNOSIS — S80.02XS CONTUSION OF LEFT KNEE, SEQUELA: ICD-10-CM

## 2019-01-01 DIAGNOSIS — D68.52 PROTHROMBIN GENE MUTATION: ICD-10-CM
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
## 2019-01-01 DIAGNOSIS — R09.89 OTHER SPECIFIED SYMPTOMS AND SIGNS INVOLVING THE CIRCULATORY AND RESPIRATORY SYSTEMS: ICD-10-CM

## 2019-01-01 LAB
-  AMIKACIN: SIGNIFICANT CHANGE UP
-  AMIKACIN: SIGNIFICANT CHANGE UP
-  AMOXICILLIN/CLAVULANIC ACID: SIGNIFICANT CHANGE UP
-  AMOXICILLIN/CLAVULANIC ACID: SIGNIFICANT CHANGE UP
-  AMPICILLIN/SULBACTAM: SIGNIFICANT CHANGE UP
-  AMPICILLIN: SIGNIFICANT CHANGE UP
-  AZTREONAM: SIGNIFICANT CHANGE UP
-  AZTREONAM: SIGNIFICANT CHANGE UP
-  CEFAZOLIN: SIGNIFICANT CHANGE UP
-  CEFEPIME: SIGNIFICANT CHANGE UP
-  CEFEPIME: SIGNIFICANT CHANGE UP
-  CEFOXITIN: SIGNIFICANT CHANGE UP
-  CEFOXITIN: SIGNIFICANT CHANGE UP
-  CEFTRIAXONE: SIGNIFICANT CHANGE UP
-  CEFTRIAXONE: SIGNIFICANT CHANGE UP
-  CIPROFLOXACIN: SIGNIFICANT CHANGE UP
-  CIPROFLOXACIN: SIGNIFICANT CHANGE UP
-  CLINDAMYCIN: SIGNIFICANT CHANGE UP
-  DAPTOMYCIN: SIGNIFICANT CHANGE UP
-  ERTAPENEM: SIGNIFICANT CHANGE UP
-  ERTAPENEM: SIGNIFICANT CHANGE UP
-  ERYTHROMYCIN: SIGNIFICANT CHANGE UP
-  GENTAMICIN: SIGNIFICANT CHANGE UP
-  IMIPENEM: SIGNIFICANT CHANGE UP
-  LEVOFLOXACIN: SIGNIFICANT CHANGE UP
-  LINEZOLID: SIGNIFICANT CHANGE UP
-  MEROPENEM: SIGNIFICANT CHANGE UP
-  MEROPENEM: SIGNIFICANT CHANGE UP
-  OXACILLIN: SIGNIFICANT CHANGE UP
-  PENICILLIN: SIGNIFICANT CHANGE UP
-  PIPERACILLIN/TAZOBACTAM: SIGNIFICANT CHANGE UP
-  PIPERACILLIN/TAZOBACTAM: SIGNIFICANT CHANGE UP
-  RIFAMPIN: SIGNIFICANT CHANGE UP
-  TETRACYCLINE: SIGNIFICANT CHANGE UP
-  TOBRAMYCIN: SIGNIFICANT CHANGE UP
-  TOBRAMYCIN: SIGNIFICANT CHANGE UP
-  TRIMETHOPRIM/SULFAMETHOXAZOLE: SIGNIFICANT CHANGE UP
-  VANCOMYCIN: SIGNIFICANT CHANGE UP
ALBUMIN SERPL ELPH-MCNC: 2.6 G/DL — LOW (ref 3.5–5.2)
ALBUMIN SERPL ELPH-MCNC: 2.7 G/DL — LOW (ref 3.5–5.2)
ALBUMIN SERPL ELPH-MCNC: 2.8 G/DL — LOW (ref 3.5–5.2)
ALBUMIN SERPL ELPH-MCNC: 3.1 G/DL — LOW (ref 3.5–5.2)
ALBUMIN SERPL ELPH-MCNC: 3.1 G/DL — LOW (ref 3.5–5.2)
ALBUMIN SERPL ELPH-MCNC: 3.2 G/DL — LOW (ref 3.5–5.2)
ALBUMIN SERPL ELPH-MCNC: 3.4 G/DL — LOW (ref 3.5–5.2)
ALBUMIN SERPL ELPH-MCNC: 3.4 G/DL — LOW (ref 3.5–5.2)
ALBUMIN SERPL ELPH-MCNC: 3.5 G/DL — SIGNIFICANT CHANGE UP (ref 3.5–5.2)
ALP SERPL-CCNC: 103 U/L — SIGNIFICANT CHANGE UP (ref 30–115)
ALP SERPL-CCNC: 108 U/L — SIGNIFICANT CHANGE UP (ref 30–115)
ALP SERPL-CCNC: 109 U/L — SIGNIFICANT CHANGE UP (ref 30–115)
ALP SERPL-CCNC: 120 U/L — HIGH (ref 30–115)
ALP SERPL-CCNC: 141 U/L — HIGH (ref 30–115)
ALP SERPL-CCNC: 152 U/L — HIGH (ref 30–115)
ALP SERPL-CCNC: 79 U/L — SIGNIFICANT CHANGE UP (ref 30–115)
ALP SERPL-CCNC: 94 U/L — SIGNIFICANT CHANGE UP (ref 30–115)
ALP SERPL-CCNC: 96 U/L — SIGNIFICANT CHANGE UP (ref 30–115)
ALT FLD-CCNC: 10 U/L — SIGNIFICANT CHANGE UP (ref 0–41)
ALT FLD-CCNC: 22 U/L — SIGNIFICANT CHANGE UP (ref 0–41)
ALT FLD-CCNC: 24 U/L — SIGNIFICANT CHANGE UP (ref 0–41)
ALT FLD-CCNC: 27 U/L — SIGNIFICANT CHANGE UP (ref 0–41)
ALT FLD-CCNC: 28 U/L — SIGNIFICANT CHANGE UP (ref 0–41)
ALT FLD-CCNC: 29 U/L — SIGNIFICANT CHANGE UP (ref 0–41)
ALT FLD-CCNC: 29 U/L — SIGNIFICANT CHANGE UP (ref 0–41)
ALT FLD-CCNC: 8 U/L — SIGNIFICANT CHANGE UP (ref 0–41)
ALT FLD-CCNC: 9 U/L — SIGNIFICANT CHANGE UP (ref 0–41)
ANION GAP SERPL CALC-SCNC: 11 MMOL/L — SIGNIFICANT CHANGE UP (ref 7–14)
ANION GAP SERPL CALC-SCNC: 12 MMOL/L — SIGNIFICANT CHANGE UP (ref 7–14)
ANION GAP SERPL CALC-SCNC: 13 MMOL/L — SIGNIFICANT CHANGE UP (ref 7–14)
ANION GAP SERPL CALC-SCNC: 14 MMOL/L — SIGNIFICANT CHANGE UP (ref 7–14)
ANION GAP SERPL CALC-SCNC: 14 MMOL/L — SIGNIFICANT CHANGE UP (ref 7–14)
ANION GAP SERPL CALC-SCNC: 15 MMOL/L — HIGH (ref 7–14)
ANION GAP SERPL CALC-SCNC: 16 MMOL/L — HIGH (ref 7–14)
ANION GAP SERPL CALC-SCNC: 16 MMOL/L — HIGH (ref 7–14)
ANION GAP SERPL CALC-SCNC: 17 MMOL/L — HIGH (ref 7–14)
ANION GAP SERPL CALC-SCNC: 19 MMOL/L — HIGH (ref 7–14)
ANISOCYTOSIS BLD QL: SIGNIFICANT CHANGE UP
APTT BLD: 27.9 SEC — SIGNIFICANT CHANGE UP (ref 27–39.2)
APTT BLD: 29.3 SEC — SIGNIFICANT CHANGE UP (ref 27–39.2)
APTT BLD: 31.2 SEC — SIGNIFICANT CHANGE UP (ref 27–39.2)
APTT BLD: 33.1 SEC — SIGNIFICANT CHANGE UP (ref 27–39.2)
APTT BLD: 33.3 SEC — SIGNIFICANT CHANGE UP (ref 27–39.2)
APTT BLD: 33.7 SEC — SIGNIFICANT CHANGE UP (ref 27–39.2)
APTT BLD: 33.8 SEC — SIGNIFICANT CHANGE UP (ref 27–39.2)
APTT BLD: 34.7 SEC — SIGNIFICANT CHANGE UP (ref 27–39.2)
APTT BLD: 34.9 SEC — SIGNIFICANT CHANGE UP (ref 27–39.2)
APTT BLD: 35.4 SEC — SIGNIFICANT CHANGE UP (ref 27–39.2)
APTT BLD: 35.8 SEC — SIGNIFICANT CHANGE UP (ref 27–39.2)
APTT BLD: 35.9 SEC — SIGNIFICANT CHANGE UP (ref 27–39.2)
APTT BLD: 36.4 SEC — SIGNIFICANT CHANGE UP (ref 27–39.2)
APTT BLD: 36.7 SEC — SIGNIFICANT CHANGE UP (ref 27–39.2)
APTT BLD: 36.9 SEC — SIGNIFICANT CHANGE UP (ref 27–39.2)
APTT BLD: 38.8 SEC — SIGNIFICANT CHANGE UP (ref 27–39.2)
APTT BLD: 43.4 SEC — HIGH (ref 27–39.2)
AST SERPL-CCNC: 11 U/L — SIGNIFICANT CHANGE UP (ref 0–41)
AST SERPL-CCNC: 12 U/L — SIGNIFICANT CHANGE UP (ref 0–41)
AST SERPL-CCNC: 14 U/L — SIGNIFICANT CHANGE UP (ref 0–41)
AST SERPL-CCNC: 16 U/L — SIGNIFICANT CHANGE UP (ref 0–41)
AST SERPL-CCNC: 16 U/L — SIGNIFICANT CHANGE UP (ref 0–41)
AST SERPL-CCNC: 18 U/L — SIGNIFICANT CHANGE UP (ref 0–41)
AST SERPL-CCNC: 24 U/L — SIGNIFICANT CHANGE UP (ref 0–41)
BACTERIA SPEC CULT: ABNORMAL
BACTERIA SPEC CULT: NORMAL
BACTERIA SPEC CULT: NORMAL
BASE EXCESS BLDV CALC-SCNC: 1.8 MMOL/L — SIGNIFICANT CHANGE UP (ref -2–2)
BASE EXCESS BLDV CALC-SCNC: 4.3 MMOL/L — HIGH (ref -2–2)
BASOPHILS # BLD AUTO: 0 K/UL — SIGNIFICANT CHANGE UP (ref 0–0.2)
BASOPHILS # BLD AUTO: 0.02 K/UL — SIGNIFICANT CHANGE UP (ref 0–0.2)
BASOPHILS # BLD AUTO: 0.03 K/UL — SIGNIFICANT CHANGE UP (ref 0–0.2)
BASOPHILS # BLD AUTO: 0.04 K/UL — SIGNIFICANT CHANGE UP (ref 0–0.2)
BASOPHILS # BLD AUTO: 0.05 K/UL — SIGNIFICANT CHANGE UP (ref 0–0.2)
BASOPHILS # BLD AUTO: 0.05 K/UL — SIGNIFICANT CHANGE UP (ref 0–0.2)
BASOPHILS # BLD AUTO: 0.06 K/UL — SIGNIFICANT CHANGE UP (ref 0–0.2)
BASOPHILS NFR BLD AUTO: 0 % — SIGNIFICANT CHANGE UP (ref 0–1)
BASOPHILS NFR BLD AUTO: 0.1 % — SIGNIFICANT CHANGE UP (ref 0–1)
BASOPHILS NFR BLD AUTO: 0.1 % — SIGNIFICANT CHANGE UP (ref 0–1)
BASOPHILS NFR BLD AUTO: 0.2 % — SIGNIFICANT CHANGE UP (ref 0–1)
BASOPHILS NFR BLD AUTO: 0.3 % — SIGNIFICANT CHANGE UP (ref 0–1)
BASOPHILS NFR BLD AUTO: 0.5 % — SIGNIFICANT CHANGE UP (ref 0–1)
BILIRUB DIRECT SERPL-MCNC: <0.2 MG/DL — SIGNIFICANT CHANGE UP (ref 0–0.2)
BILIRUB INDIRECT FLD-MCNC: >0.2 MG/DL — SIGNIFICANT CHANGE UP (ref 0.2–1.2)
BILIRUB SERPL-MCNC: 0.3 MG/DL — SIGNIFICANT CHANGE UP (ref 0.2–1.2)
BILIRUB SERPL-MCNC: 0.3 MG/DL — SIGNIFICANT CHANGE UP (ref 0.2–1.2)
BILIRUB SERPL-MCNC: 0.4 MG/DL — SIGNIFICANT CHANGE UP (ref 0.2–1.2)
BILIRUB SERPL-MCNC: 0.4 MG/DL — SIGNIFICANT CHANGE UP (ref 0.2–1.2)
BILIRUB SERPL-MCNC: 0.5 MG/DL — SIGNIFICANT CHANGE UP (ref 0.2–1.2)
BILIRUB SERPL-MCNC: 0.5 MG/DL — SIGNIFICANT CHANGE UP (ref 0.2–1.2)
BILIRUB SERPL-MCNC: 1 MG/DL — SIGNIFICANT CHANGE UP (ref 0.2–1.2)
BILIRUB SERPL-MCNC: 1.1 MG/DL — SIGNIFICANT CHANGE UP (ref 0.2–1.2)
BILIRUB SERPL-MCNC: 1.3 MG/DL — HIGH (ref 0.2–1.2)
BLD GP AB SCN SERPL QL: SIGNIFICANT CHANGE UP
BUN SERPL-MCNC: 13 MG/DL — SIGNIFICANT CHANGE UP (ref 10–20)
BUN SERPL-MCNC: 14 MG/DL — SIGNIFICANT CHANGE UP (ref 10–20)
BUN SERPL-MCNC: 14 MG/DL — SIGNIFICANT CHANGE UP (ref 10–20)
BUN SERPL-MCNC: 15 MG/DL — SIGNIFICANT CHANGE UP (ref 10–20)
BUN SERPL-MCNC: 15 MG/DL — SIGNIFICANT CHANGE UP (ref 10–20)
BUN SERPL-MCNC: 16 MG/DL — SIGNIFICANT CHANGE UP (ref 10–20)
BUN SERPL-MCNC: 17 MG/DL — SIGNIFICANT CHANGE UP (ref 10–20)
BUN SERPL-MCNC: 18 MG/DL — SIGNIFICANT CHANGE UP (ref 10–20)
BUN SERPL-MCNC: 19 MG/DL — SIGNIFICANT CHANGE UP (ref 10–20)
BUN SERPL-MCNC: 20 MG/DL — SIGNIFICANT CHANGE UP (ref 10–20)
BUN SERPL-MCNC: 22 MG/DL — HIGH (ref 10–20)
BUN SERPL-MCNC: 25 MG/DL — HIGH (ref 10–20)
BUN SERPL-MCNC: 29 MG/DL — HIGH (ref 10–20)
CA-I SERPL-SCNC: 1.11 MMOL/L — LOW (ref 1.12–1.3)
CA-I SERPL-SCNC: 1.14 MMOL/L — SIGNIFICANT CHANGE UP (ref 1.12–1.3)
CALCIUM SERPL-MCNC: 8 MG/DL — LOW (ref 8.5–10.1)
CALCIUM SERPL-MCNC: 8 MG/DL — LOW (ref 8.5–10.1)
CALCIUM SERPL-MCNC: 8.1 MG/DL — LOW (ref 8.5–10.1)
CALCIUM SERPL-MCNC: 8.3 MG/DL — LOW (ref 8.5–10.1)
CALCIUM SERPL-MCNC: 8.4 MG/DL — LOW (ref 8.5–10.1)
CALCIUM SERPL-MCNC: 8.5 MG/DL — SIGNIFICANT CHANGE UP (ref 8.5–10.1)
CALCIUM SERPL-MCNC: 8.6 MG/DL — SIGNIFICANT CHANGE UP (ref 8.5–10.1)
CALCIUM SERPL-MCNC: 8.7 MG/DL — SIGNIFICANT CHANGE UP (ref 8.5–10.1)
CALCIUM SERPL-MCNC: 8.8 MG/DL — SIGNIFICANT CHANGE UP (ref 8.5–10.1)
CHLORIDE SERPL-SCNC: 100 MMOL/L — SIGNIFICANT CHANGE UP (ref 98–110)
CHLORIDE SERPL-SCNC: 100 MMOL/L — SIGNIFICANT CHANGE UP (ref 98–110)
CHLORIDE SERPL-SCNC: 101 MMOL/L — SIGNIFICANT CHANGE UP (ref 98–110)
CHLORIDE SERPL-SCNC: 103 MMOL/L — SIGNIFICANT CHANGE UP (ref 98–110)
CHLORIDE SERPL-SCNC: 104 MMOL/L — SIGNIFICANT CHANGE UP (ref 98–110)
CHLORIDE SERPL-SCNC: 105 MMOL/L — SIGNIFICANT CHANGE UP (ref 98–110)
CHLORIDE SERPL-SCNC: 106 MMOL/L — SIGNIFICANT CHANGE UP (ref 98–110)
CHLORIDE SERPL-SCNC: 107 MMOL/L — SIGNIFICANT CHANGE UP (ref 98–110)
CHLORIDE SERPL-SCNC: 96 MMOL/L — LOW (ref 98–110)
CHLORIDE SERPL-SCNC: 98 MMOL/L — SIGNIFICANT CHANGE UP (ref 98–110)
CHLORIDE SERPL-SCNC: 99 MMOL/L — SIGNIFICANT CHANGE UP (ref 98–110)
CO2 SERPL-SCNC: 22 MMOL/L — SIGNIFICANT CHANGE UP (ref 17–32)
CO2 SERPL-SCNC: 23 MMOL/L — SIGNIFICANT CHANGE UP (ref 17–32)
CO2 SERPL-SCNC: 23 MMOL/L — SIGNIFICANT CHANGE UP (ref 17–32)
CO2 SERPL-SCNC: 24 MMOL/L — SIGNIFICANT CHANGE UP (ref 17–32)
CO2 SERPL-SCNC: 25 MMOL/L — SIGNIFICANT CHANGE UP (ref 17–32)
CO2 SERPL-SCNC: 26 MMOL/L — SIGNIFICANT CHANGE UP (ref 17–32)
CO2 SERPL-SCNC: 28 MMOL/L — SIGNIFICANT CHANGE UP (ref 17–32)
CO2 SERPL-SCNC: 29 MMOL/L — SIGNIFICANT CHANGE UP (ref 17–32)
CO2 SERPL-SCNC: 30 MMOL/L — SIGNIFICANT CHANGE UP (ref 17–32)
CO2 SERPL-SCNC: 31 MMOL/L — SIGNIFICANT CHANGE UP (ref 17–32)
CREAT SERPL-MCNC: 0.9 MG/DL — SIGNIFICANT CHANGE UP (ref 0.7–1.5)
CREAT SERPL-MCNC: 0.9 MG/DL — SIGNIFICANT CHANGE UP (ref 0.7–1.5)
CREAT SERPL-MCNC: 1 MG/DL — SIGNIFICANT CHANGE UP (ref 0.7–1.5)
CREAT SERPL-MCNC: 1.1 MG/DL — SIGNIFICANT CHANGE UP (ref 0.7–1.5)
CREAT SERPL-MCNC: 1.2 MG/DL — SIGNIFICANT CHANGE UP (ref 0.7–1.5)
CREAT SERPL-MCNC: 1.3 MG/DL — SIGNIFICANT CHANGE UP (ref 0.7–1.5)
CREAT SERPL-MCNC: 1.4 MG/DL — SIGNIFICANT CHANGE UP (ref 0.7–1.5)
CREAT SERPL-MCNC: 1.5 MG/DL — SIGNIFICANT CHANGE UP (ref 0.7–1.5)
CULTURE RESULTS: SIGNIFICANT CHANGE UP
EOSINOPHIL # BLD AUTO: 0 K/UL — SIGNIFICANT CHANGE UP (ref 0–0.7)
EOSINOPHIL # BLD AUTO: 0.01 K/UL — SIGNIFICANT CHANGE UP (ref 0–0.7)
EOSINOPHIL # BLD AUTO: 0.02 K/UL — SIGNIFICANT CHANGE UP (ref 0–0.7)
EOSINOPHIL # BLD AUTO: 0.02 K/UL — SIGNIFICANT CHANGE UP (ref 0–0.7)
EOSINOPHIL # BLD AUTO: 0.04 K/UL — SIGNIFICANT CHANGE UP (ref 0–0.7)
EOSINOPHIL # BLD AUTO: 0.04 K/UL — SIGNIFICANT CHANGE UP (ref 0–0.7)
EOSINOPHIL # BLD AUTO: 0.09 K/UL — SIGNIFICANT CHANGE UP (ref 0–0.7)
EOSINOPHIL # BLD AUTO: 0.1 K/UL — SIGNIFICANT CHANGE UP (ref 0–0.7)
EOSINOPHIL # BLD AUTO: 0.13 K/UL — SIGNIFICANT CHANGE UP (ref 0–0.7)
EOSINOPHIL # BLD AUTO: 0.13 K/UL — SIGNIFICANT CHANGE UP (ref 0–0.7)
EOSINOPHIL NFR BLD AUTO: 0 % — SIGNIFICANT CHANGE UP (ref 0–8)
EOSINOPHIL NFR BLD AUTO: 0.1 % — SIGNIFICANT CHANGE UP (ref 0–8)
EOSINOPHIL NFR BLD AUTO: 0.2 % — SIGNIFICANT CHANGE UP (ref 0–8)
EOSINOPHIL NFR BLD AUTO: 0.2 % — SIGNIFICANT CHANGE UP (ref 0–8)
EOSINOPHIL NFR BLD AUTO: 0.3 % — SIGNIFICANT CHANGE UP (ref 0–8)
EOSINOPHIL NFR BLD AUTO: 0.3 % — SIGNIFICANT CHANGE UP (ref 0–8)
EOSINOPHIL NFR BLD AUTO: 0.9 % — SIGNIFICANT CHANGE UP (ref 0–8)
EOSINOPHIL NFR BLD AUTO: 0.9 % — SIGNIFICANT CHANGE UP (ref 0–8)
EOSINOPHIL NFR BLD AUTO: 1.4 % — SIGNIFICANT CHANGE UP (ref 0–8)
EOSINOPHIL NFR BLD AUTO: 1.5 % — SIGNIFICANT CHANGE UP (ref 0–8)
GAS PNL BLDV: 141 MMOL/L — SIGNIFICANT CHANGE UP (ref 136–145)
GAS PNL BLDV: 143 MMOL/L — SIGNIFICANT CHANGE UP (ref 136–145)
GAS PNL BLDV: SIGNIFICANT CHANGE UP
GAS PNL BLDV: SIGNIFICANT CHANGE UP
GLUCOSE SERPL-MCNC: 108 MG/DL — HIGH (ref 70–99)
GLUCOSE SERPL-MCNC: 112 MG/DL — HIGH (ref 70–99)
GLUCOSE SERPL-MCNC: 115 MG/DL — HIGH (ref 70–99)
GLUCOSE SERPL-MCNC: 116 MG/DL — HIGH (ref 70–99)
GLUCOSE SERPL-MCNC: 118 MG/DL — HIGH (ref 70–99)
GLUCOSE SERPL-MCNC: 124 MG/DL — HIGH (ref 70–99)
GLUCOSE SERPL-MCNC: 127 MG/DL — HIGH (ref 70–99)
GLUCOSE SERPL-MCNC: 130 MG/DL — HIGH (ref 70–99)
GLUCOSE SERPL-MCNC: 133 MG/DL — HIGH (ref 70–99)
GLUCOSE SERPL-MCNC: 135 MG/DL — HIGH (ref 70–99)
GLUCOSE SERPL-MCNC: 141 MG/DL — HIGH (ref 70–99)
GLUCOSE SERPL-MCNC: 144 MG/DL — HIGH (ref 70–99)
GLUCOSE SERPL-MCNC: 145 MG/DL — HIGH (ref 70–99)
GLUCOSE SERPL-MCNC: 148 MG/DL — HIGH (ref 70–99)
GLUCOSE SERPL-MCNC: 149 MG/DL — HIGH (ref 70–99)
GLUCOSE SERPL-MCNC: 151 MG/DL — HIGH (ref 70–99)
GLUCOSE SERPL-MCNC: 151 MG/DL — HIGH (ref 70–99)
GLUCOSE SERPL-MCNC: 157 MG/DL — HIGH (ref 70–99)
GLUCOSE SERPL-MCNC: 173 MG/DL — HIGH (ref 70–99)
GLUCOSE SERPL-MCNC: 176 MG/DL — HIGH (ref 70–99)
GLUCOSE SERPL-MCNC: 180 MG/DL — HIGH (ref 70–99)
GLUCOSE SERPL-MCNC: 181 MG/DL — HIGH (ref 70–99)
GLUCOSE SERPL-MCNC: 184 MG/DL — HIGH (ref 70–99)
GLUCOSE SERPL-MCNC: 187 MG/DL — HIGH (ref 70–99)
GLUCOSE SERPL-MCNC: 204 MG/DL — HIGH (ref 70–99)
GLUCOSE SERPL-MCNC: 93 MG/DL — SIGNIFICANT CHANGE UP (ref 70–99)
GRAM STN FLD: SIGNIFICANT CHANGE UP
HCO3 BLDV-SCNC: 28 MMOL/L — SIGNIFICANT CHANGE UP (ref 22–29)
HCO3 BLDV-SCNC: 29 MMOL/L — SIGNIFICANT CHANGE UP (ref 22–29)
HCT VFR BLD CALC: 34.7 % — LOW (ref 37–47)
HCT VFR BLD CALC: 35.1 % — LOW (ref 37–47)
HCT VFR BLD CALC: 35.9 % — LOW (ref 37–47)
HCT VFR BLD CALC: 36.8 % — LOW (ref 37–47)
HCT VFR BLD CALC: 36.9 % — LOW (ref 37–47)
HCT VFR BLD CALC: 37.2 % — SIGNIFICANT CHANGE UP (ref 37–47)
HCT VFR BLD CALC: 37.4 % — SIGNIFICANT CHANGE UP (ref 37–47)
HCT VFR BLD CALC: 37.4 % — SIGNIFICANT CHANGE UP (ref 37–47)
HCT VFR BLD CALC: 37.9 % — SIGNIFICANT CHANGE UP (ref 37–47)
HCT VFR BLD CALC: 38 % — SIGNIFICANT CHANGE UP (ref 37–47)
HCT VFR BLD CALC: 38.1 % — SIGNIFICANT CHANGE UP (ref 37–47)
HCT VFR BLD CALC: 38.4 % — SIGNIFICANT CHANGE UP (ref 37–47)
HCT VFR BLD CALC: 38.4 % — SIGNIFICANT CHANGE UP (ref 37–47)
HCT VFR BLD CALC: 38.8 % — SIGNIFICANT CHANGE UP (ref 37–47)
HCT VFR BLD CALC: 38.8 % — SIGNIFICANT CHANGE UP (ref 37–47)
HCT VFR BLD CALC: 39.1 % — SIGNIFICANT CHANGE UP (ref 37–47)
HCT VFR BLD CALC: 39.1 % — SIGNIFICANT CHANGE UP (ref 37–47)
HCT VFR BLD CALC: 39.3 % — SIGNIFICANT CHANGE UP (ref 37–47)
HCT VFR BLD CALC: 39.8 % — SIGNIFICANT CHANGE UP (ref 37–47)
HCT VFR BLD CALC: 40.1 % — SIGNIFICANT CHANGE UP (ref 37–47)
HCT VFR BLD CALC: 40.3 % — SIGNIFICANT CHANGE UP (ref 37–47)
HCT VFR BLD CALC: 40.5 % — SIGNIFICANT CHANGE UP (ref 37–47)
HCT VFR BLD CALC: 40.9 % — SIGNIFICANT CHANGE UP (ref 37–47)
HCT VFR BLD CALC: 42.9 % — SIGNIFICANT CHANGE UP (ref 37–47)
HCT VFR BLD CALC: 43 % — SIGNIFICANT CHANGE UP (ref 37–47)
HCT VFR BLD CALC: 45.7 % — SIGNIFICANT CHANGE UP (ref 37–47)
HCT VFR BLDA CALC: 39.6 % — SIGNIFICANT CHANGE UP (ref 34–44)
HCT VFR BLDA CALC: 42.4 % — SIGNIFICANT CHANGE UP (ref 34–44)
HCV AB S/CO SERPL IA: 0.17 S/CO — SIGNIFICANT CHANGE UP (ref 0–0.99)
HCV AB SERPL-IMP: SIGNIFICANT CHANGE UP
HGB BLD CALC-MCNC: 12.9 G/DL — LOW (ref 14–18)
HGB BLD CALC-MCNC: 13.8 G/DL — LOW (ref 14–18)
HGB BLD-MCNC: 10.4 G/DL — LOW (ref 12–16)
HGB BLD-MCNC: 11 G/DL — LOW (ref 12–16)
HGB BLD-MCNC: 11.2 G/DL — LOW (ref 12–16)
HGB BLD-MCNC: 11.2 G/DL — LOW (ref 12–16)
HGB BLD-MCNC: 11.5 G/DL — LOW (ref 12–16)
HGB BLD-MCNC: 11.6 G/DL — LOW (ref 12–16)
HGB BLD-MCNC: 11.7 G/DL — LOW (ref 12–16)
HGB BLD-MCNC: 11.8 G/DL — LOW (ref 12–16)
HGB BLD-MCNC: 11.8 G/DL — LOW (ref 12–16)
HGB BLD-MCNC: 11.9 G/DL — LOW (ref 12–16)
HGB BLD-MCNC: 12.1 G/DL — SIGNIFICANT CHANGE UP (ref 12–16)
HGB BLD-MCNC: 12.2 G/DL — SIGNIFICANT CHANGE UP (ref 12–16)
HGB BLD-MCNC: 12.2 G/DL — SIGNIFICANT CHANGE UP (ref 12–16)
HGB BLD-MCNC: 12.3 G/DL — SIGNIFICANT CHANGE UP (ref 12–16)
HGB BLD-MCNC: 12.4 G/DL — SIGNIFICANT CHANGE UP (ref 12–16)
HGB BLD-MCNC: 12.7 G/DL — SIGNIFICANT CHANGE UP (ref 12–16)
HGB BLD-MCNC: 12.8 G/DL — SIGNIFICANT CHANGE UP (ref 12–16)
HGB BLD-MCNC: 12.8 G/DL — SIGNIFICANT CHANGE UP (ref 12–16)
HGB BLD-MCNC: 13 G/DL — SIGNIFICANT CHANGE UP (ref 12–16)
HGB BLD-MCNC: 13.5 G/DL — SIGNIFICANT CHANGE UP (ref 12–16)
HGB BLD-MCNC: 13.7 G/DL — SIGNIFICANT CHANGE UP (ref 12–16)
HGB BLD-MCNC: 14.4 G/DL — SIGNIFICANT CHANGE UP (ref 12–16)
IMM GRANULOCYTES NFR BLD AUTO: 0.9 % — HIGH (ref 0.1–0.3)
IMM GRANULOCYTES NFR BLD AUTO: 1 % — HIGH (ref 0.1–0.3)
IMM GRANULOCYTES NFR BLD AUTO: 1.1 % — HIGH (ref 0.1–0.3)
IMM GRANULOCYTES NFR BLD AUTO: 1.2 % — HIGH (ref 0.1–0.3)
IMM GRANULOCYTES NFR BLD AUTO: 1.3 % — HIGH (ref 0.1–0.3)
IMM GRANULOCYTES NFR BLD AUTO: 1.4 % — HIGH (ref 0.1–0.3)
IMM GRANULOCYTES NFR BLD AUTO: 1.4 % — HIGH (ref 0.1–0.3)
IMM GRANULOCYTES NFR BLD AUTO: 1.6 % — HIGH (ref 0.1–0.3)
IMM GRANULOCYTES NFR BLD AUTO: 1.6 % — HIGH (ref 0.1–0.3)
IMM GRANULOCYTES NFR BLD AUTO: 1.7 % — HIGH (ref 0.1–0.3)
IMM GRANULOCYTES NFR BLD AUTO: 2 % — HIGH (ref 0.1–0.3)
IMM GRANULOCYTES NFR BLD AUTO: 3.3 % — HIGH (ref 0.1–0.3)
INR BLD: 1.48 RATIO — HIGH (ref 0.65–1.3)
INR BLD: 1.7 RATIO — HIGH (ref 0.65–1.3)
INR BLD: 1.81 RATIO — HIGH (ref 0.65–1.3)
INR BLD: 1.83 RATIO — HIGH (ref 0.65–1.3)
INR BLD: 2.02 RATIO — HIGH (ref 0.65–1.3)
INR BLD: 2.04 RATIO — HIGH (ref 0.65–1.3)
INR BLD: 2.08 RATIO — HIGH (ref 0.65–1.3)
INR BLD: 2.09 RATIO — HIGH (ref 0.65–1.3)
INR BLD: 2.21 RATIO — HIGH (ref 0.65–1.3)
INR BLD: 2.33 RATIO — HIGH (ref 0.65–1.3)
INR BLD: 2.41 RATIO — HIGH (ref 0.65–1.3)
INR BLD: 2.46 RATIO — HIGH (ref 0.65–1.3)
INR BLD: 2.47 RATIO — HIGH (ref 0.65–1.3)
INR BLD: 2.49 RATIO — HIGH (ref 0.65–1.3)
INR BLD: 2.6 RATIO — HIGH (ref 0.65–1.3)
INR BLD: 2.62 RATIO — HIGH (ref 0.65–1.3)
INR BLD: 2.78 RATIO — HIGH (ref 0.65–1.3)
INR BLD: 2.86 RATIO — HIGH (ref 0.65–1.3)
INR BLD: 2.92 RATIO — HIGH (ref 0.65–1.3)
INR BLD: 2.92 RATIO — HIGH (ref 0.65–1.3)
INR BLD: 3.01 RATIO — HIGH (ref 0.65–1.3)
INR BLD: 3.29 RATIO — HIGH (ref 0.65–1.3)
INR BLD: 3.44 RATIO — HIGH (ref 0.65–1.3)
LACTATE BLDV-MCNC: 1.3 MMOL/L — SIGNIFICANT CHANGE UP (ref 0.5–1.6)
LACTATE BLDV-MCNC: 2.7 MMOL/L — HIGH (ref 0.5–1.6)
LACTATE SERPL-SCNC: 1.1 MMOL/L — SIGNIFICANT CHANGE UP (ref 0.5–2.2)
LACTATE SERPL-SCNC: 2.4 MMOL/L — HIGH (ref 0.7–2)
LYMPHOCYTES # BLD AUTO: 1.29 K/UL — SIGNIFICANT CHANGE UP (ref 1.2–3.4)
LYMPHOCYTES # BLD AUTO: 1.55 K/UL — SIGNIFICANT CHANGE UP (ref 1.2–3.4)
LYMPHOCYTES # BLD AUTO: 1.59 K/UL — SIGNIFICANT CHANGE UP (ref 1.2–3.4)
LYMPHOCYTES # BLD AUTO: 1.76 K/UL — SIGNIFICANT CHANGE UP (ref 1.2–3.4)
LYMPHOCYTES # BLD AUTO: 1.98 K/UL — SIGNIFICANT CHANGE UP (ref 1.2–3.4)
LYMPHOCYTES # BLD AUTO: 11.4 % — LOW (ref 20.5–51.1)
LYMPHOCYTES # BLD AUTO: 12.2 % — LOW (ref 20.5–51.1)
LYMPHOCYTES # BLD AUTO: 12.8 % — LOW (ref 20.5–51.1)
LYMPHOCYTES # BLD AUTO: 13.1 % — LOW (ref 20.5–51.1)
LYMPHOCYTES # BLD AUTO: 14.7 % — LOW (ref 20.5–51.1)
LYMPHOCYTES # BLD AUTO: 15.4 % — LOW (ref 20.5–51.1)
LYMPHOCYTES # BLD AUTO: 16.8 % — LOW (ref 20.5–51.1)
LYMPHOCYTES # BLD AUTO: 17.2 % — LOW (ref 20.5–51.1)
LYMPHOCYTES # BLD AUTO: 2.1 K/UL — SIGNIFICANT CHANGE UP (ref 1.2–3.4)
LYMPHOCYTES # BLD AUTO: 2.2 K/UL — SIGNIFICANT CHANGE UP (ref 1.2–3.4)
LYMPHOCYTES # BLD AUTO: 2.29 K/UL — SIGNIFICANT CHANGE UP (ref 1.2–3.4)
LYMPHOCYTES # BLD AUTO: 2.41 K/UL — SIGNIFICANT CHANGE UP (ref 1.2–3.4)
LYMPHOCYTES # BLD AUTO: 2.54 K/UL — SIGNIFICANT CHANGE UP (ref 1.2–3.4)
LYMPHOCYTES # BLD AUTO: 2.9 K/UL — SIGNIFICANT CHANGE UP (ref 1.2–3.4)
LYMPHOCYTES # BLD AUTO: 2.97 K/UL — SIGNIFICANT CHANGE UP (ref 1.2–3.4)
LYMPHOCYTES # BLD AUTO: 21.1 % — SIGNIFICANT CHANGE UP (ref 20.5–51.1)
LYMPHOCYTES # BLD AUTO: 25.1 % — SIGNIFICANT CHANGE UP (ref 20.5–51.1)
LYMPHOCYTES # BLD AUTO: 25.6 % — SIGNIFICANT CHANGE UP (ref 20.5–51.1)
LYMPHOCYTES # BLD AUTO: 25.7 % — SIGNIFICANT CHANGE UP (ref 20.5–51.1)
LYMPHOCYTES # BLD AUTO: 25.9 % — SIGNIFICANT CHANGE UP (ref 20.5–51.1)
LYMPHOCYTES # BLD AUTO: 3 K/UL — SIGNIFICANT CHANGE UP (ref 1.2–3.4)
LYMPHOCYTES # BLD AUTO: 3.07 K/UL — SIGNIFICANT CHANGE UP (ref 1.2–3.4)
LYMPHOCYTES # BLD AUTO: 3.39 K/UL — SIGNIFICANT CHANGE UP (ref 1.2–3.4)
LYMPHOCYTES # BLD AUTO: 32.2 % — SIGNIFICANT CHANGE UP (ref 20.5–51.1)
LYMPHOCYTES # BLD AUTO: 39 % — SIGNIFICANT CHANGE UP (ref 20.5–51.1)
MAGNESIUM SERPL-MCNC: 1.6 MG/DL — LOW (ref 1.8–2.4)
MAGNESIUM SERPL-MCNC: 1.6 MG/DL — LOW (ref 1.8–2.4)
MAGNESIUM SERPL-MCNC: 1.7 MG/DL — LOW (ref 1.8–2.4)
MAGNESIUM SERPL-MCNC: 1.8 MG/DL — SIGNIFICANT CHANGE UP (ref 1.8–2.4)
MAGNESIUM SERPL-MCNC: 1.9 MG/DL — SIGNIFICANT CHANGE UP (ref 1.8–2.4)
MAGNESIUM SERPL-MCNC: 2 MG/DL — SIGNIFICANT CHANGE UP (ref 1.8–2.4)
MAGNESIUM SERPL-MCNC: 2 MG/DL — SIGNIFICANT CHANGE UP (ref 1.8–2.4)
MAGNESIUM SERPL-MCNC: 2.1 MG/DL — SIGNIFICANT CHANGE UP (ref 1.8–2.4)
MAGNESIUM SERPL-MCNC: 2.1 MG/DL — SIGNIFICANT CHANGE UP (ref 1.8–2.4)
MAGNESIUM SERPL-MCNC: 2.2 MG/DL — SIGNIFICANT CHANGE UP (ref 1.8–2.4)
MANUAL SMEAR VERIFICATION: SIGNIFICANT CHANGE UP
MCHC RBC-ENTMCNC: 26.9 PG — LOW (ref 27–31)
MCHC RBC-ENTMCNC: 27 PG — SIGNIFICANT CHANGE UP (ref 27–31)
MCHC RBC-ENTMCNC: 27.5 PG — SIGNIFICANT CHANGE UP (ref 27–31)
MCHC RBC-ENTMCNC: 27.6 PG — SIGNIFICANT CHANGE UP (ref 27–31)
MCHC RBC-ENTMCNC: 27.7 PG — SIGNIFICANT CHANGE UP (ref 27–31)
MCHC RBC-ENTMCNC: 27.8 PG — SIGNIFICANT CHANGE UP (ref 27–31)
MCHC RBC-ENTMCNC: 27.9 PG — SIGNIFICANT CHANGE UP (ref 27–31)
MCHC RBC-ENTMCNC: 27.9 PG — SIGNIFICANT CHANGE UP (ref 27–31)
MCHC RBC-ENTMCNC: 28 PG — SIGNIFICANT CHANGE UP (ref 27–31)
MCHC RBC-ENTMCNC: 28.1 PG — SIGNIFICANT CHANGE UP (ref 27–31)
MCHC RBC-ENTMCNC: 28.1 PG — SIGNIFICANT CHANGE UP (ref 27–31)
MCHC RBC-ENTMCNC: 28.2 PG — SIGNIFICANT CHANGE UP (ref 27–31)
MCHC RBC-ENTMCNC: 28.3 PG — SIGNIFICANT CHANGE UP (ref 27–31)
MCHC RBC-ENTMCNC: 28.4 PG — SIGNIFICANT CHANGE UP (ref 27–31)
MCHC RBC-ENTMCNC: 28.4 PG — SIGNIFICANT CHANGE UP (ref 27–31)
MCHC RBC-ENTMCNC: 28.5 PG — SIGNIFICANT CHANGE UP (ref 27–31)
MCHC RBC-ENTMCNC: 28.5 PG — SIGNIFICANT CHANGE UP (ref 27–31)
MCHC RBC-ENTMCNC: 28.7 PG — SIGNIFICANT CHANGE UP (ref 27–31)
MCHC RBC-ENTMCNC: 28.7 PG — SIGNIFICANT CHANGE UP (ref 27–31)
MCHC RBC-ENTMCNC: 29 PG — SIGNIFICANT CHANGE UP (ref 27–31)
MCHC RBC-ENTMCNC: 29 PG — SIGNIFICANT CHANGE UP (ref 27–31)
MCHC RBC-ENTMCNC: 29.6 G/DL — LOW (ref 32–37)
MCHC RBC-ENTMCNC: 29.6 G/DL — LOW (ref 32–37)
MCHC RBC-ENTMCNC: 30 G/DL — LOW (ref 32–37)
MCHC RBC-ENTMCNC: 30.5 G/DL — LOW (ref 32–37)
MCHC RBC-ENTMCNC: 30.7 G/DL — LOW (ref 32–37)
MCHC RBC-ENTMCNC: 30.7 G/DL — LOW (ref 32–37)
MCHC RBC-ENTMCNC: 30.8 G/DL — LOW (ref 32–37)
MCHC RBC-ENTMCNC: 31 G/DL — LOW (ref 32–37)
MCHC RBC-ENTMCNC: 31.2 G/DL — LOW (ref 32–37)
MCHC RBC-ENTMCNC: 31.3 G/DL — LOW (ref 32–37)
MCHC RBC-ENTMCNC: 31.4 G/DL — LOW (ref 32–37)
MCHC RBC-ENTMCNC: 31.5 G/DL — LOW (ref 32–37)
MCHC RBC-ENTMCNC: 31.6 G/DL — LOW (ref 32–37)
MCHC RBC-ENTMCNC: 31.8 G/DL — LOW (ref 32–37)
MCHC RBC-ENTMCNC: 31.9 G/DL — LOW (ref 32–37)
MCHC RBC-ENTMCNC: 32.1 G/DL — SIGNIFICANT CHANGE UP (ref 32–37)
MCHC RBC-ENTMCNC: 32.3 G/DL — SIGNIFICANT CHANGE UP (ref 32–37)
MCHC RBC-ENTMCNC: 32.7 G/DL — SIGNIFICANT CHANGE UP (ref 32–37)
MCV RBC AUTO: 88 FL — SIGNIFICANT CHANGE UP (ref 81–99)
MCV RBC AUTO: 88.2 FL — SIGNIFICANT CHANGE UP (ref 81–99)
MCV RBC AUTO: 88.2 FL — SIGNIFICANT CHANGE UP (ref 81–99)
MCV RBC AUTO: 88.3 FL — SIGNIFICANT CHANGE UP (ref 81–99)
MCV RBC AUTO: 88.6 FL — SIGNIFICANT CHANGE UP (ref 81–99)
MCV RBC AUTO: 88.7 FL — SIGNIFICANT CHANGE UP (ref 81–99)
MCV RBC AUTO: 88.8 FL — SIGNIFICANT CHANGE UP (ref 81–99)
MCV RBC AUTO: 88.9 FL — SIGNIFICANT CHANGE UP (ref 81–99)
MCV RBC AUTO: 89.3 FL — SIGNIFICANT CHANGE UP (ref 81–99)
MCV RBC AUTO: 89.5 FL — SIGNIFICANT CHANGE UP (ref 81–99)
MCV RBC AUTO: 89.6 FL — SIGNIFICANT CHANGE UP (ref 81–99)
MCV RBC AUTO: 89.8 FL — SIGNIFICANT CHANGE UP (ref 81–99)
MCV RBC AUTO: 89.9 FL — SIGNIFICANT CHANGE UP (ref 81–99)
MCV RBC AUTO: 89.9 FL — SIGNIFICANT CHANGE UP (ref 81–99)
MCV RBC AUTO: 90 FL — SIGNIFICANT CHANGE UP (ref 81–99)
MCV RBC AUTO: 90.5 FL — SIGNIFICANT CHANGE UP (ref 81–99)
MCV RBC AUTO: 91 FL — SIGNIFICANT CHANGE UP (ref 81–99)
MCV RBC AUTO: 91.1 FL — SIGNIFICANT CHANGE UP (ref 81–99)
MCV RBC AUTO: 91.2 FL — SIGNIFICANT CHANGE UP (ref 81–99)
MCV RBC AUTO: 91.3 FL — SIGNIFICANT CHANGE UP (ref 81–99)
MCV RBC AUTO: 91.4 FL — SIGNIFICANT CHANGE UP (ref 81–99)
MCV RBC AUTO: 91.8 FL — SIGNIFICANT CHANGE UP (ref 81–99)
MCV RBC AUTO: 92.4 FL — SIGNIFICANT CHANGE UP (ref 81–99)
METHOD TYPE: SIGNIFICANT CHANGE UP
MICROCYTES BLD QL: SIGNIFICANT CHANGE UP
MONOCYTES # BLD AUTO: 0.42 K/UL — SIGNIFICANT CHANGE UP (ref 0.1–0.6)
MONOCYTES # BLD AUTO: 0.46 K/UL — SIGNIFICANT CHANGE UP (ref 0.1–0.6)
MONOCYTES # BLD AUTO: 0.49 K/UL — SIGNIFICANT CHANGE UP (ref 0.1–0.6)
MONOCYTES # BLD AUTO: 0.53 K/UL — SIGNIFICANT CHANGE UP (ref 0.1–0.6)
MONOCYTES # BLD AUTO: 0.57 K/UL — SIGNIFICANT CHANGE UP (ref 0.1–0.6)
MONOCYTES # BLD AUTO: 0.58 K/UL — SIGNIFICANT CHANGE UP (ref 0.1–0.6)
MONOCYTES # BLD AUTO: 0.61 K/UL — HIGH (ref 0.1–0.6)
MONOCYTES # BLD AUTO: 0.61 K/UL — HIGH (ref 0.1–0.6)
MONOCYTES # BLD AUTO: 0.63 K/UL — HIGH (ref 0.1–0.6)
MONOCYTES # BLD AUTO: 0.66 K/UL — HIGH (ref 0.1–0.6)
MONOCYTES # BLD AUTO: 0.7 K/UL — HIGH (ref 0.1–0.6)
MONOCYTES # BLD AUTO: 0.7 K/UL — HIGH (ref 0.1–0.6)
MONOCYTES # BLD AUTO: 1.05 K/UL — HIGH (ref 0.1–0.6)
MONOCYTES NFR BLD AUTO: 3.9 % — SIGNIFICANT CHANGE UP (ref 1.7–9.3)
MONOCYTES NFR BLD AUTO: 4.1 % — SIGNIFICANT CHANGE UP (ref 1.7–9.3)
MONOCYTES NFR BLD AUTO: 4.2 % — SIGNIFICANT CHANGE UP (ref 1.7–9.3)
MONOCYTES NFR BLD AUTO: 4.3 % — SIGNIFICANT CHANGE UP (ref 1.7–9.3)
MONOCYTES NFR BLD AUTO: 4.3 % — SIGNIFICANT CHANGE UP (ref 1.7–9.3)
MONOCYTES NFR BLD AUTO: 4.4 % — SIGNIFICANT CHANGE UP (ref 1.7–9.3)
MONOCYTES NFR BLD AUTO: 4.8 % — SIGNIFICANT CHANGE UP (ref 1.7–9.3)
MONOCYTES NFR BLD AUTO: 4.9 % — SIGNIFICANT CHANGE UP (ref 1.7–9.3)
MONOCYTES NFR BLD AUTO: 4.9 % — SIGNIFICANT CHANGE UP (ref 1.7–9.3)
MONOCYTES NFR BLD AUTO: 5 % — SIGNIFICANT CHANGE UP (ref 1.7–9.3)
MONOCYTES NFR BLD AUTO: 5.1 % — SIGNIFICANT CHANGE UP (ref 1.7–9.3)
MONOCYTES NFR BLD AUTO: 6.1 % — SIGNIFICANT CHANGE UP (ref 1.7–9.3)
MONOCYTES NFR BLD AUTO: 6.3 % — SIGNIFICANT CHANGE UP (ref 1.7–9.3)
MONOCYTES NFR BLD AUTO: 6.8 % — SIGNIFICANT CHANGE UP (ref 1.7–9.3)
MONOCYTES NFR BLD AUTO: 6.9 % — SIGNIFICANT CHANGE UP (ref 1.7–9.3)
MRSA PCR RESULT.: NEGATIVE — SIGNIFICANT CHANGE UP
NEUTROPHILS # BLD AUTO: 10.12 K/UL — HIGH (ref 1.4–6.5)
NEUTROPHILS # BLD AUTO: 11.14 K/UL — HIGH (ref 1.4–6.5)
NEUTROPHILS # BLD AUTO: 11.24 K/UL — HIGH (ref 1.4–6.5)
NEUTROPHILS # BLD AUTO: 12.39 K/UL — HIGH (ref 1.4–6.5)
NEUTROPHILS # BLD AUTO: 20.1 K/UL — HIGH (ref 1.4–6.5)
NEUTROPHILS # BLD AUTO: 4.48 K/UL — SIGNIFICANT CHANGE UP (ref 1.4–6.5)
NEUTROPHILS # BLD AUTO: 5.36 K/UL — SIGNIFICANT CHANGE UP (ref 1.4–6.5)
NEUTROPHILS # BLD AUTO: 6.38 K/UL — SIGNIFICANT CHANGE UP (ref 1.4–6.5)
NEUTROPHILS # BLD AUTO: 6.74 K/UL — HIGH (ref 1.4–6.5)
NEUTROPHILS # BLD AUTO: 7.03 K/UL — HIGH (ref 1.4–6.5)
NEUTROPHILS # BLD AUTO: 7.33 K/UL — HIGH (ref 1.4–6.5)
NEUTROPHILS # BLD AUTO: 7.86 K/UL — HIGH (ref 1.4–6.5)
NEUTROPHILS # BLD AUTO: 7.98 K/UL — HIGH (ref 1.4–6.5)
NEUTROPHILS # BLD AUTO: 8.1 K/UL — HIGH (ref 1.4–6.5)
NEUTROPHILS # BLD AUTO: 8.6 K/UL — HIGH (ref 1.4–6.5)
NEUTROPHILS NFR BLD AUTO: 51.5 % — SIGNIFICANT CHANGE UP (ref 42.2–75.2)
NEUTROPHILS NFR BLD AUTO: 57.4 % — SIGNIFICANT CHANGE UP (ref 42.2–75.2)
NEUTROPHILS NFR BLD AUTO: 65.4 % — SIGNIFICANT CHANGE UP (ref 42.2–75.2)
NEUTROPHILS NFR BLD AUTO: 66.7 % — SIGNIFICANT CHANGE UP (ref 42.2–75.2)
NEUTROPHILS NFR BLD AUTO: 66.7 % — SIGNIFICANT CHANGE UP (ref 42.2–75.2)
NEUTROPHILS NFR BLD AUTO: 67.6 % — SIGNIFICANT CHANGE UP (ref 42.2–75.2)
NEUTROPHILS NFR BLD AUTO: 70.9 % — SIGNIFICANT CHANGE UP (ref 42.2–75.2)
NEUTROPHILS NFR BLD AUTO: 73.9 % — SIGNIFICANT CHANGE UP (ref 42.2–75.2)
NEUTROPHILS NFR BLD AUTO: 74.6 % — SIGNIFICANT CHANGE UP (ref 42.2–75.2)
NEUTROPHILS NFR BLD AUTO: 77.4 % — HIGH (ref 42.2–75.2)
NEUTROPHILS NFR BLD AUTO: 78.6 % — HIGH (ref 42.2–75.2)
NEUTROPHILS NFR BLD AUTO: 79.6 % — HIGH (ref 42.2–75.2)
NEUTROPHILS NFR BLD AUTO: 80.1 % — HIGH (ref 42.2–75.2)
NEUTROPHILS NFR BLD AUTO: 81.1 % — HIGH (ref 42.2–75.2)
NEUTROPHILS NFR BLD AUTO: 82.7 % — HIGH (ref 42.2–75.2)
NEUTS BAND # BLD: 8.7 % — HIGH (ref 0–6)
NIGHT BLUE STAIN TISS: SIGNIFICANT CHANGE UP
NIGHT BLUE STAIN TISS: SIGNIFICANT CHANGE UP
NRBC # BLD: 0 /100 WBCS — SIGNIFICANT CHANGE UP (ref 0–0)
ORGANISM # SPEC MICROSCOPIC CNT: SIGNIFICANT CHANGE UP
PCO2 BLDV: 42 MMHG — SIGNIFICANT CHANGE UP (ref 41–51)
PCO2 BLDV: 46 MMHG — SIGNIFICANT CHANGE UP (ref 41–51)
PH BLDV: 7.38 — SIGNIFICANT CHANGE UP (ref 7.26–7.43)
PH BLDV: 7.44 — HIGH (ref 7.26–7.43)
PHOSPHATE SERPL-MCNC: 2.8 MG/DL — SIGNIFICANT CHANGE UP (ref 2.1–4.9)
PHOSPHATE SERPL-MCNC: 2.9 MG/DL — SIGNIFICANT CHANGE UP (ref 2.1–4.9)
PHOSPHATE SERPL-MCNC: 3.3 MG/DL — SIGNIFICANT CHANGE UP (ref 2.1–4.9)
PHOSPHATE SERPL-MCNC: 3.4 MG/DL — SIGNIFICANT CHANGE UP (ref 2.1–4.9)
PHOSPHATE SERPL-MCNC: 3.5 MG/DL — SIGNIFICANT CHANGE UP (ref 2.1–4.9)
PHOSPHATE SERPL-MCNC: 3.6 MG/DL — SIGNIFICANT CHANGE UP (ref 2.1–4.9)
PHOSPHATE SERPL-MCNC: 3.8 MG/DL — SIGNIFICANT CHANGE UP (ref 2.1–4.9)
PHOSPHATE SERPL-MCNC: 4.2 MG/DL — SIGNIFICANT CHANGE UP (ref 2.1–4.9)
PHOSPHATE SERPL-MCNC: 4.2 MG/DL — SIGNIFICANT CHANGE UP (ref 2.1–4.9)
PHOSPHATE SERPL-MCNC: 4.3 MG/DL — SIGNIFICANT CHANGE UP (ref 2.1–4.9)
PLAT MORPH BLD: NORMAL — SIGNIFICANT CHANGE UP
PLATELET # BLD AUTO: 223 K/UL — SIGNIFICANT CHANGE UP (ref 130–400)
PLATELET # BLD AUTO: 225 K/UL — SIGNIFICANT CHANGE UP (ref 130–400)
PLATELET # BLD AUTO: 227 K/UL — SIGNIFICANT CHANGE UP (ref 130–400)
PLATELET # BLD AUTO: 231 K/UL — SIGNIFICANT CHANGE UP (ref 130–400)
PLATELET # BLD AUTO: 235 K/UL — SIGNIFICANT CHANGE UP (ref 130–400)
PLATELET # BLD AUTO: 235 K/UL — SIGNIFICANT CHANGE UP (ref 130–400)
PLATELET # BLD AUTO: 237 K/UL — SIGNIFICANT CHANGE UP (ref 130–400)
PLATELET # BLD AUTO: 238 K/UL — SIGNIFICANT CHANGE UP (ref 130–400)
PLATELET # BLD AUTO: 269 K/UL — SIGNIFICANT CHANGE UP (ref 130–400)
PLATELET # BLD AUTO: 271 K/UL — SIGNIFICANT CHANGE UP (ref 130–400)
PLATELET # BLD AUTO: 275 K/UL — SIGNIFICANT CHANGE UP (ref 130–400)
PLATELET # BLD AUTO: 282 K/UL — SIGNIFICANT CHANGE UP (ref 130–400)
PLATELET # BLD AUTO: 285 K/UL — SIGNIFICANT CHANGE UP (ref 130–400)
PLATELET # BLD AUTO: 290 K/UL — SIGNIFICANT CHANGE UP (ref 130–400)
PLATELET # BLD AUTO: 300 K/UL — SIGNIFICANT CHANGE UP (ref 130–400)
PLATELET # BLD AUTO: 300 K/UL — SIGNIFICANT CHANGE UP (ref 130–400)
PLATELET # BLD AUTO: 302 K/UL — SIGNIFICANT CHANGE UP (ref 130–400)
PLATELET # BLD AUTO: 312 K/UL — SIGNIFICANT CHANGE UP (ref 130–400)
PLATELET # BLD AUTO: 317 K/UL — SIGNIFICANT CHANGE UP (ref 130–400)
PLATELET # BLD AUTO: 329 K/UL — SIGNIFICANT CHANGE UP (ref 130–400)
PLATELET # BLD AUTO: 345 K/UL — SIGNIFICANT CHANGE UP (ref 130–400)
PLATELET # BLD AUTO: 356 K/UL — SIGNIFICANT CHANGE UP (ref 130–400)
PLATELET # BLD AUTO: 360 K/UL — SIGNIFICANT CHANGE UP (ref 130–400)
PLATELET # BLD AUTO: 360 K/UL — SIGNIFICANT CHANGE UP (ref 130–400)
PLATELET # BLD AUTO: 373 K/UL — SIGNIFICANT CHANGE UP (ref 130–400)
PLATELET # BLD AUTO: 375 K/UL — SIGNIFICANT CHANGE UP (ref 130–400)
PO2 BLDV: 20 MMHG — SIGNIFICANT CHANGE UP (ref 20–40)
PO2 BLDV: 23 MMHG — SIGNIFICANT CHANGE UP (ref 20–40)
POCT INR: 1.3 RATIO — HIGH (ref 0.9–1.2)
POCT INR: 1.5 RATIO — HIGH (ref 0.9–1.2)
POCT INR: 1.6 RATIO — HIGH (ref 0.9–1.2)
POCT INR: 1.8 RATIO — HIGH (ref 0.9–1.2)
POCT INR: 1.9 RATIO — HIGH (ref 0.9–1.2)
POCT INR: 2.1 RATIO — HIGH (ref 0.9–1.2)
POCT INR: 2.2 RATIO — HIGH (ref 0.9–1.2)
POCT INR: 2.3 RATIO — HIGH (ref 0.9–1.2)
POCT INR: 2.4 RATIO — HIGH (ref 0.9–1.2)
POCT INR: 2.6 RATIO — HIGH (ref 0.9–1.2)
POCT INR: 2.6 RATIO — HIGH (ref 0.9–1.2)
POCT INR: 2.7 RATIO — HIGH (ref 0.9–1.2)
POCT INR: 2.9 RATIO — HIGH (ref 0.9–1.2)
POCT INR: 3 RATIO — HIGH (ref 0.9–1.2)
POCT INR: 3.8 RATIO — HIGH (ref 0.9–1.2)
POCT INR: 4.1 RATIO — HIGH (ref 0.9–1.2)
POCT INR: 4.6 RATIO — HIGH (ref 0.9–1.2)
POCT INR: 4.7 RATIO — HIGH (ref 0.9–1.2)
POCT INR: 5.4 RATIO — CRITICAL HIGH (ref 0.9–1.2)
POCT PT: 16.2 SEC — HIGH (ref 10–13.4)
POCT PT: 18.4 SEC — HIGH (ref 10–13.4)
POCT PT: 19.7 SEC — HIGH (ref 10–13.4)
POCT PT: 21.4 SEC — HIGH (ref 10–13.4)
POCT PT: 23.1 SEC — HIGH (ref 10–13.4)
POCT PT: 24.8 SEC — HIGH (ref 10–13.4)
POCT PT: 24.9 SEC — HIGH (ref 10–13.4)
POCT PT: 25.4 SEC — HIGH (ref 10–13.4)
POCT PT: 25.8 SEC — HIGH (ref 10–13.4)
POCT PT: 26 SEC — HIGH (ref 10–13.4)
POCT PT: 26.5 SEC — HIGH (ref 10–13.4)
POCT PT: 27.5 SEC — HIGH (ref 10–13.4)
POCT PT: 28.7 SEC — HIGH (ref 10–13.4)
POCT PT: 30.8 SEC — HIGH (ref 10–13.4)
POCT PT: 31.3 SEC — HIGH (ref 10–13.4)
POCT PT: 32.1 SEC — HIGH (ref 10–13.4)
POCT PT: 34.3 SEC — HIGH (ref 10–13.4)
POCT PT: 35.8 SEC — HIGH (ref 10–13.4)
POCT PT: 45.5 SEC — HIGH (ref 10–13.4)
POCT PT: 49.4 SEC — HIGH (ref 10–13.4)
POCT PT: 54.9 SEC — HIGH (ref 10–13.4)
POCT PT: 56.8 SEC — HIGH (ref 10–13.4)
POCT PT: 65.1 SEC — HIGH (ref 10–13.4)
POIKILOCYTOSIS BLD QL AUTO: SLIGHT — SIGNIFICANT CHANGE UP
POTASSIUM BLDV-SCNC: 3.7 MMOL/L — SIGNIFICANT CHANGE UP (ref 3.3–5.6)
POTASSIUM BLDV-SCNC: 3.9 MMOL/L — SIGNIFICANT CHANGE UP (ref 3.3–5.6)
POTASSIUM SERPL-MCNC: 3.1 MMOL/L — LOW (ref 3.5–5)
POTASSIUM SERPL-MCNC: 3.3 MMOL/L — LOW (ref 3.5–5)
POTASSIUM SERPL-MCNC: 3.4 MMOL/L — LOW (ref 3.5–5)
POTASSIUM SERPL-MCNC: 3.5 MMOL/L — SIGNIFICANT CHANGE UP (ref 3.5–5)
POTASSIUM SERPL-MCNC: 3.5 MMOL/L — SIGNIFICANT CHANGE UP (ref 3.5–5)
POTASSIUM SERPL-MCNC: 3.6 MMOL/L — SIGNIFICANT CHANGE UP (ref 3.5–5)
POTASSIUM SERPL-MCNC: 3.7 MMOL/L — SIGNIFICANT CHANGE UP (ref 3.5–5)
POTASSIUM SERPL-MCNC: 3.8 MMOL/L — SIGNIFICANT CHANGE UP (ref 3.5–5)
POTASSIUM SERPL-MCNC: 3.9 MMOL/L — SIGNIFICANT CHANGE UP (ref 3.5–5)
POTASSIUM SERPL-MCNC: 4 MMOL/L — SIGNIFICANT CHANGE UP (ref 3.5–5)
POTASSIUM SERPL-MCNC: 4.1 MMOL/L — SIGNIFICANT CHANGE UP (ref 3.5–5)
POTASSIUM SERPL-MCNC: 4.3 MMOL/L — SIGNIFICANT CHANGE UP (ref 3.5–5)
POTASSIUM SERPL-MCNC: 4.4 MMOL/L — SIGNIFICANT CHANGE UP (ref 3.5–5)
POTASSIUM SERPL-MCNC: 4.4 MMOL/L — SIGNIFICANT CHANGE UP (ref 3.5–5)
POTASSIUM SERPL-MCNC: 4.5 MMOL/L — SIGNIFICANT CHANGE UP (ref 3.5–5)
POTASSIUM SERPL-MCNC: 4.6 MMOL/L — SIGNIFICANT CHANGE UP (ref 3.5–5)
POTASSIUM SERPL-MCNC: 4.6 MMOL/L — SIGNIFICANT CHANGE UP (ref 3.5–5)
POTASSIUM SERPL-MCNC: 4.8 MMOL/L — SIGNIFICANT CHANGE UP (ref 3.5–5)
POTASSIUM SERPL-SCNC: 3.1 MMOL/L — LOW (ref 3.5–5)
POTASSIUM SERPL-SCNC: 3.3 MMOL/L — LOW (ref 3.5–5)
POTASSIUM SERPL-SCNC: 3.4 MMOL/L — LOW (ref 3.5–5)
POTASSIUM SERPL-SCNC: 3.5 MMOL/L — SIGNIFICANT CHANGE UP (ref 3.5–5)
POTASSIUM SERPL-SCNC: 3.5 MMOL/L — SIGNIFICANT CHANGE UP (ref 3.5–5)
POTASSIUM SERPL-SCNC: 3.6 MMOL/L — SIGNIFICANT CHANGE UP (ref 3.5–5)
POTASSIUM SERPL-SCNC: 3.7 MMOL/L — SIGNIFICANT CHANGE UP (ref 3.5–5)
POTASSIUM SERPL-SCNC: 3.8 MMOL/L — SIGNIFICANT CHANGE UP (ref 3.5–5)
POTASSIUM SERPL-SCNC: 3.9 MMOL/L — SIGNIFICANT CHANGE UP (ref 3.5–5)
POTASSIUM SERPL-SCNC: 4 MMOL/L — SIGNIFICANT CHANGE UP (ref 3.5–5)
POTASSIUM SERPL-SCNC: 4.1 MMOL/L — SIGNIFICANT CHANGE UP (ref 3.5–5)
POTASSIUM SERPL-SCNC: 4.3 MMOL/L — SIGNIFICANT CHANGE UP (ref 3.5–5)
POTASSIUM SERPL-SCNC: 4.4 MMOL/L — SIGNIFICANT CHANGE UP (ref 3.5–5)
POTASSIUM SERPL-SCNC: 4.4 MMOL/L — SIGNIFICANT CHANGE UP (ref 3.5–5)
POTASSIUM SERPL-SCNC: 4.5 MMOL/L — SIGNIFICANT CHANGE UP (ref 3.5–5)
POTASSIUM SERPL-SCNC: 4.6 MMOL/L — SIGNIFICANT CHANGE UP (ref 3.5–5)
POTASSIUM SERPL-SCNC: 4.6 MMOL/L — SIGNIFICANT CHANGE UP (ref 3.5–5)
POTASSIUM SERPL-SCNC: 4.8 MMOL/L — SIGNIFICANT CHANGE UP (ref 3.5–5)
PROT SERPL-MCNC: 5.1 G/DL — LOW (ref 6–8)
PROT SERPL-MCNC: 5.4 G/DL — LOW (ref 6–8)
PROT SERPL-MCNC: 5.6 G/DL — LOW (ref 6–8)
PROT SERPL-MCNC: 5.6 G/DL — LOW (ref 6–8)
PROT SERPL-MCNC: 5.7 G/DL — LOW (ref 6–8)
PROT SERPL-MCNC: 5.8 G/DL — LOW (ref 6–8)
PROT SERPL-MCNC: 5.9 G/DL — LOW (ref 6–8)
PROT SERPL-MCNC: 6 G/DL — SIGNIFICANT CHANGE UP (ref 6–8)
PROT SERPL-MCNC: 6 G/DL — SIGNIFICANT CHANGE UP (ref 6–8)
PROTHROM AB SERPL-ACNC: 16.9 SEC — HIGH (ref 9.95–12.87)
PROTHROM AB SERPL-ACNC: 19.5 SEC — HIGH (ref 9.95–12.87)
PROTHROM AB SERPL-ACNC: 20.7 SEC — HIGH (ref 9.95–12.87)
PROTHROM AB SERPL-ACNC: 20.9 SEC — HIGH (ref 9.95–12.87)
PROTHROM AB SERPL-ACNC: 23.1 SEC — HIGH (ref 9.95–12.87)
PROTHROM AB SERPL-ACNC: 23.3 SEC — HIGH (ref 9.95–12.87)
PROTHROM AB SERPL-ACNC: 23.7 SEC — HIGH (ref 9.95–12.87)
PROTHROM AB SERPL-ACNC: 23.9 SEC — HIGH (ref 9.95–12.87)
PROTHROM AB SERPL-ACNC: 25.2 SEC — HIGH (ref 9.95–12.87)
PROTHROM AB SERPL-ACNC: 26.6 SEC — HIGH (ref 9.95–12.87)
PROTHROM AB SERPL-ACNC: 27.5 SEC — HIGH (ref 9.95–12.87)
PROTHROM AB SERPL-ACNC: 28 SEC — HIGH (ref 9.95–12.87)
PROTHROM AB SERPL-ACNC: 28.1 SEC — HIGH (ref 9.95–12.87)
PROTHROM AB SERPL-ACNC: 28.4 SEC — HIGH (ref 9.95–12.87)
PROTHROM AB SERPL-ACNC: 29.6 SEC — HIGH (ref 9.95–12.87)
PROTHROM AB SERPL-ACNC: 29.8 SEC — HIGH (ref 9.95–12.87)
PROTHROM AB SERPL-ACNC: 31.7 SEC — HIGH (ref 9.95–12.87)
PROTHROM AB SERPL-ACNC: 32.6 SEC — HIGH (ref 9.95–12.87)
PROTHROM AB SERPL-ACNC: 33.2 SEC — HIGH (ref 9.95–12.87)
PROTHROM AB SERPL-ACNC: 33.2 SEC — HIGH (ref 9.95–12.87)
PROTHROM AB SERPL-ACNC: 34.2 SEC — HIGH (ref 9.95–12.87)
PROTHROM AB SERPL-ACNC: 37.4 SEC — HIGH (ref 9.95–12.87)
PROTHROM AB SERPL-ACNC: 39.1 SEC — HIGH (ref 9.95–12.87)
RBC # BLD: 3.78 M/UL — LOW (ref 4.2–5.4)
RBC # BLD: 3.93 M/UL — LOW (ref 4.2–5.4)
RBC # BLD: 4.08 M/UL — LOW (ref 4.2–5.4)
RBC # BLD: 4.09 M/UL — LOW (ref 4.2–5.4)
RBC # BLD: 4.1 M/UL — LOW (ref 4.2–5.4)
RBC # BLD: 4.1 M/UL — LOW (ref 4.2–5.4)
RBC # BLD: 4.15 M/UL — LOW (ref 4.2–5.4)
RBC # BLD: 4.16 M/UL — LOW (ref 4.2–5.4)
RBC # BLD: 4.17 M/UL — LOW (ref 4.2–5.4)
RBC # BLD: 4.2 M/UL — SIGNIFICANT CHANGE UP (ref 4.2–5.4)
RBC # BLD: 4.2 M/UL — SIGNIFICANT CHANGE UP (ref 4.2–5.4)
RBC # BLD: 4.22 M/UL — SIGNIFICANT CHANGE UP (ref 4.2–5.4)
RBC # BLD: 4.22 M/UL — SIGNIFICANT CHANGE UP (ref 4.2–5.4)
RBC # BLD: 4.29 M/UL — SIGNIFICANT CHANGE UP (ref 4.2–5.4)
RBC # BLD: 4.37 M/UL — SIGNIFICANT CHANGE UP (ref 4.2–5.4)
RBC # BLD: 4.38 M/UL — SIGNIFICANT CHANGE UP (ref 4.2–5.4)
RBC # BLD: 4.4 M/UL — SIGNIFICANT CHANGE UP (ref 4.2–5.4)
RBC # BLD: 4.41 M/UL — SIGNIFICANT CHANGE UP (ref 4.2–5.4)
RBC # BLD: 4.43 M/UL — SIGNIFICANT CHANGE UP (ref 4.2–5.4)
RBC # BLD: 4.43 M/UL — SIGNIFICANT CHANGE UP (ref 4.2–5.4)
RBC # BLD: 4.54 M/UL — SIGNIFICANT CHANGE UP (ref 4.2–5.4)
RBC # BLD: 4.55 M/UL — SIGNIFICANT CHANGE UP (ref 4.2–5.4)
RBC # BLD: 4.59 M/UL — SIGNIFICANT CHANGE UP (ref 4.2–5.4)
RBC # BLD: 4.71 M/UL — SIGNIFICANT CHANGE UP (ref 4.2–5.4)
RBC # BLD: 4.8 M/UL — SIGNIFICANT CHANGE UP (ref 4.2–5.4)
RBC # BLD: 5.02 M/UL — SIGNIFICANT CHANGE UP (ref 4.2–5.4)
RBC # FLD: 15.3 % — HIGH (ref 11.5–14.5)
RBC # FLD: 15.3 % — HIGH (ref 11.5–14.5)
RBC # FLD: 15.4 % — HIGH (ref 11.5–14.5)
RBC # FLD: 15.5 % — HIGH (ref 11.5–14.5)
RBC # FLD: 15.5 % — HIGH (ref 11.5–14.5)
RBC # FLD: 15.8 % — HIGH (ref 11.5–14.5)
RBC # FLD: 15.9 % — HIGH (ref 11.5–14.5)
RBC # FLD: 15.9 % — HIGH (ref 11.5–14.5)
RBC # FLD: 16.1 % — HIGH (ref 11.5–14.5)
RBC # FLD: 16.1 % — HIGH (ref 11.5–14.5)
RBC # FLD: 16.4 % — HIGH (ref 11.5–14.5)
RBC # FLD: 16.6 % — HIGH (ref 11.5–14.5)
RBC # FLD: 16.8 % — HIGH (ref 11.5–14.5)
RBC # FLD: 16.9 % — HIGH (ref 11.5–14.5)
RBC # FLD: 16.9 % — HIGH (ref 11.5–14.5)
RBC # FLD: 17.2 % — HIGH (ref 11.5–14.5)
RBC # FLD: 17.4 % — HIGH (ref 11.5–14.5)
RBC # FLD: 17.4 % — HIGH (ref 11.5–14.5)
RBC # FLD: 17.5 % — HIGH (ref 11.5–14.5)
RBC # FLD: 17.7 % — HIGH (ref 11.5–14.5)
RBC BLD AUTO: NORMAL — SIGNIFICANT CHANGE UP
SAO2 % BLDV: 31 % — SIGNIFICANT CHANGE UP
SAO2 % BLDV: 42 % — SIGNIFICANT CHANGE UP
SODIUM SERPL-SCNC: 137 MMOL/L — SIGNIFICANT CHANGE UP (ref 135–146)
SODIUM SERPL-SCNC: 138 MMOL/L — SIGNIFICANT CHANGE UP (ref 135–146)
SODIUM SERPL-SCNC: 138 MMOL/L — SIGNIFICANT CHANGE UP (ref 135–146)
SODIUM SERPL-SCNC: 139 MMOL/L — SIGNIFICANT CHANGE UP (ref 135–146)
SODIUM SERPL-SCNC: 140 MMOL/L — SIGNIFICANT CHANGE UP (ref 135–146)
SODIUM SERPL-SCNC: 141 MMOL/L — SIGNIFICANT CHANGE UP (ref 135–146)
SODIUM SERPL-SCNC: 142 MMOL/L — SIGNIFICANT CHANGE UP (ref 135–146)
SODIUM SERPL-SCNC: 143 MMOL/L — SIGNIFICANT CHANGE UP (ref 135–146)
SODIUM SERPL-SCNC: 144 MMOL/L — SIGNIFICANT CHANGE UP (ref 135–146)
SODIUM SERPL-SCNC: 145 MMOL/L — SIGNIFICANT CHANGE UP (ref 135–146)
SODIUM SERPL-SCNC: 145 MMOL/L — SIGNIFICANT CHANGE UP (ref 135–146)
SODIUM SERPL-SCNC: 146 MMOL/L — SIGNIFICANT CHANGE UP (ref 135–146)
SODIUM SERPL-SCNC: 146 MMOL/L — SIGNIFICANT CHANGE UP (ref 135–146)
SPECIMEN SOURCE: SIGNIFICANT CHANGE UP
SURGICAL PATHOLOGY STUDY: SIGNIFICANT CHANGE UP
TACROLIMUS SERPL-MCNC: 14.6 NG/ML — SIGNIFICANT CHANGE UP
TACROLIMUS SERPL-MCNC: 7.3 NG/ML — SIGNIFICANT CHANGE UP
VANCOMYCIN TROUGH SERPL-MCNC: 10.2 UG/ML — HIGH (ref 5–10)
VANCOMYCIN TROUGH SERPL-MCNC: 14.4 UG/ML — HIGH (ref 5–10)
VANCOMYCIN TROUGH SERPL-MCNC: 24.1 UG/ML — HIGH (ref 5–10)
VANCOMYCIN TROUGH SERPL-MCNC: 24.9 UG/ML — HIGH (ref 5–10)
VANCOMYCIN TROUGH SERPL-MCNC: 27.7 UG/ML — HIGH (ref 5–10)
VARIANT LYMPHS # BLD: 0.9 % — SIGNIFICANT CHANGE UP (ref 0–5)
WBC # BLD: 10.11 K/UL — SIGNIFICANT CHANGE UP (ref 4.8–10.8)
WBC # BLD: 10.16 K/UL — SIGNIFICANT CHANGE UP (ref 4.8–10.8)
WBC # BLD: 10.19 K/UL — SIGNIFICANT CHANGE UP (ref 4.8–10.8)
WBC # BLD: 10.31 K/UL — SIGNIFICANT CHANGE UP (ref 4.8–10.8)
WBC # BLD: 11.2 K/UL — HIGH (ref 4.8–10.8)
WBC # BLD: 11.32 K/UL — HIGH (ref 4.8–10.8)
WBC # BLD: 11.52 K/UL — HIGH (ref 4.8–10.8)
WBC # BLD: 11.96 K/UL — HIGH (ref 4.8–10.8)
WBC # BLD: 12.15 K/UL — HIGH (ref 4.8–10.8)
WBC # BLD: 12.5 K/UL — HIGH (ref 4.8–10.8)
WBC # BLD: 12.93 K/UL — HIGH (ref 4.8–10.8)
WBC # BLD: 13.08 K/UL — HIGH (ref 4.8–10.8)
WBC # BLD: 13.53 K/UL — HIGH (ref 4.8–10.8)
WBC # BLD: 13.6 K/UL — HIGH (ref 4.8–10.8)
WBC # BLD: 13.74 K/UL — HIGH (ref 4.8–10.8)
WBC # BLD: 15.55 K/UL — HIGH (ref 4.8–10.8)
WBC # BLD: 18.02 K/UL — HIGH (ref 4.8–10.8)
WBC # BLD: 24.34 K/UL — HIGH (ref 4.8–10.8)
WBC # BLD: 8.61 K/UL — SIGNIFICANT CHANGE UP (ref 4.8–10.8)
WBC # BLD: 8.69 K/UL — SIGNIFICANT CHANGE UP (ref 4.8–10.8)
WBC # BLD: 9.14 K/UL — SIGNIFICANT CHANGE UP (ref 4.8–10.8)
WBC # BLD: 9.33 K/UL — SIGNIFICANT CHANGE UP (ref 4.8–10.8)
WBC # BLD: 9.43 K/UL — SIGNIFICANT CHANGE UP (ref 4.8–10.8)
WBC # BLD: 9.76 K/UL — SIGNIFICANT CHANGE UP (ref 4.8–10.8)
WBC # BLD: 9.82 K/UL — SIGNIFICANT CHANGE UP (ref 4.8–10.8)
WBC # BLD: 9.93 K/UL — SIGNIFICANT CHANGE UP (ref 4.8–10.8)
WBC # FLD AUTO: 10.11 K/UL — SIGNIFICANT CHANGE UP (ref 4.8–10.8)
WBC # FLD AUTO: 10.16 K/UL — SIGNIFICANT CHANGE UP (ref 4.8–10.8)
WBC # FLD AUTO: 10.19 K/UL — SIGNIFICANT CHANGE UP (ref 4.8–10.8)
WBC # FLD AUTO: 10.31 K/UL — SIGNIFICANT CHANGE UP (ref 4.8–10.8)
WBC # FLD AUTO: 11.2 K/UL — HIGH (ref 4.8–10.8)
WBC # FLD AUTO: 11.32 K/UL — HIGH (ref 4.8–10.8)
WBC # FLD AUTO: 11.52 K/UL — HIGH (ref 4.8–10.8)
WBC # FLD AUTO: 11.96 K/UL — HIGH (ref 4.8–10.8)
WBC # FLD AUTO: 12.15 K/UL — HIGH (ref 4.8–10.8)
WBC # FLD AUTO: 12.5 K/UL — HIGH (ref 4.8–10.8)
WBC # FLD AUTO: 12.93 K/UL — HIGH (ref 4.8–10.8)
WBC # FLD AUTO: 13.08 K/UL — HIGH (ref 4.8–10.8)
WBC # FLD AUTO: 13.53 K/UL — HIGH (ref 4.8–10.8)
WBC # FLD AUTO: 13.6 K/UL — HIGH (ref 4.8–10.8)
WBC # FLD AUTO: 13.74 K/UL — HIGH (ref 4.8–10.8)
WBC # FLD AUTO: 15.55 K/UL — HIGH (ref 4.8–10.8)
WBC # FLD AUTO: 18.02 K/UL — HIGH (ref 4.8–10.8)
WBC # FLD AUTO: 24.34 K/UL — HIGH (ref 4.8–10.8)
WBC # FLD AUTO: 8.61 K/UL — SIGNIFICANT CHANGE UP (ref 4.8–10.8)
WBC # FLD AUTO: 8.69 K/UL — SIGNIFICANT CHANGE UP (ref 4.8–10.8)
WBC # FLD AUTO: 9.14 K/UL — SIGNIFICANT CHANGE UP (ref 4.8–10.8)
WBC # FLD AUTO: 9.33 K/UL — SIGNIFICANT CHANGE UP (ref 4.8–10.8)
WBC # FLD AUTO: 9.43 K/UL — SIGNIFICANT CHANGE UP (ref 4.8–10.8)
WBC # FLD AUTO: 9.76 K/UL — SIGNIFICANT CHANGE UP (ref 4.8–10.8)
WBC # FLD AUTO: 9.82 K/UL — SIGNIFICANT CHANGE UP (ref 4.8–10.8)
WBC # FLD AUTO: 9.93 K/UL — SIGNIFICANT CHANGE UP (ref 4.8–10.8)

## 2019-01-01 PROCEDURE — 72170 X-RAY EXAM OF PELVIS: CPT | Mod: 26

## 2019-01-01 PROCEDURE — 11046 DBRDMT MUSC&/FSCA EA ADDL: CPT

## 2019-01-01 PROCEDURE — 99213 OFFICE O/P EST LOW 20 MIN: CPT | Mod: 25

## 2019-01-01 PROCEDURE — 99213 OFFICE O/P EST LOW 20 MIN: CPT

## 2019-01-01 PROCEDURE — 73590 X-RAY EXAM OF LOWER LEG: CPT | Mod: 26,LT

## 2019-01-01 PROCEDURE — 99231 SBSQ HOSP IP/OBS SF/LOW 25: CPT

## 2019-01-01 PROCEDURE — 11043 DBRDMT MUSC&/FSCA 1ST 20/<: CPT

## 2019-01-01 PROCEDURE — 99285 EMERGENCY DEPT VISIT HI MDM: CPT

## 2019-01-01 PROCEDURE — 88304 TISSUE EXAM BY PATHOLOGIST: CPT | Mod: 26

## 2019-01-01 PROCEDURE — 99232 SBSQ HOSP IP/OBS MODERATE 35: CPT | Mod: 25

## 2019-01-01 PROCEDURE — 16025 DRESS/DEBRID P-THICK BURN M: CPT

## 2019-01-01 PROCEDURE — 16030 DRESS/DEBRID P-THICK BURN L: CPT

## 2019-01-01 PROCEDURE — 93010 ELECTROCARDIOGRAM REPORT: CPT

## 2019-01-01 PROCEDURE — 99291 CRITICAL CARE FIRST HOUR: CPT

## 2019-01-01 PROCEDURE — 71045 X-RAY EXAM CHEST 1 VIEW: CPT | Mod: 26

## 2019-01-01 PROCEDURE — 73562 X-RAY EXAM OF KNEE 3: CPT | Mod: 26,LT

## 2019-01-01 PROCEDURE — 99238 HOSP IP/OBS DSCHRG MGMT 30/<: CPT

## 2019-01-01 PROCEDURE — 99212 OFFICE O/P EST SF 10 MIN: CPT

## 2019-01-01 PROCEDURE — 73060 X-RAY EXAM OF HUMERUS: CPT | Mod: 26,RT

## 2019-01-01 PROCEDURE — 97602 WOUND(S) CARE NON-SELECTIVE: CPT

## 2019-01-01 PROCEDURE — 93971 EXTREMITY STUDY: CPT | Mod: 26,LT

## 2019-01-01 PROCEDURE — 73700 CT LOWER EXTREMITY W/O DYE: CPT | Mod: 26,RT

## 2019-01-01 PROCEDURE — 10140 I&D HMTMA SEROMA/FLUID COLLJ: CPT

## 2019-01-01 PROCEDURE — 99285 EMERGENCY DEPT VISIT HI MDM: CPT | Mod: GC

## 2019-01-01 PROCEDURE — 99284 EMERGENCY DEPT VISIT MOD MDM: CPT

## 2019-01-01 RX ORDER — CHLORHEXIDINE GLUCONATE 213 G/1000ML
1 SOLUTION TOPICAL
Refills: 0 | Status: DISCONTINUED | OUTPATIENT
Start: 2019-01-01 | End: 2019-01-01

## 2019-01-01 RX ORDER — OXYCODONE HYDROCHLORIDE 5 MG/1
5 TABLET ORAL ONCE
Refills: 0 | Status: DISCONTINUED | OUTPATIENT
Start: 2019-01-01 | End: 2019-01-01

## 2019-01-01 RX ORDER — DOCUSATE SODIUM 100 MG
100 CAPSULE ORAL THREE TIMES A DAY
Refills: 0 | Status: DISCONTINUED | OUTPATIENT
Start: 2019-01-01 | End: 2019-01-01

## 2019-01-01 RX ORDER — WARFARIN SODIUM 2.5 MG/1
2 TABLET ORAL ONCE
Refills: 0 | Status: COMPLETED | OUTPATIENT
Start: 2019-01-01 | End: 2019-01-01

## 2019-01-01 RX ORDER — SODIUM CHLORIDE 9 MG/ML
1000 INJECTION INTRAMUSCULAR; INTRAVENOUS; SUBCUTANEOUS
Refills: 0 | Status: DISCONTINUED | OUTPATIENT
Start: 2019-01-01 | End: 2019-01-01

## 2019-01-01 RX ORDER — AMOXICILLIN AND CLAVULANATE POTASSIUM 875; 125 MG/1; MG/1
875-125 TABLET, COATED ORAL
Qty: 14 | Refills: 0 | Status: ACTIVE | COMMUNITY
Start: 2019-01-01 | End: 1900-01-01

## 2019-01-01 RX ORDER — AMLODIPINE BESYLATE 2.5 MG/1
5 TABLET ORAL DAILY
Refills: 0 | Status: DISCONTINUED | OUTPATIENT
Start: 2019-01-01 | End: 2019-01-01

## 2019-01-01 RX ORDER — PREGABALIN 225 MG/1
1000 CAPSULE ORAL DAILY
Refills: 0 | Status: DISCONTINUED | OUTPATIENT
Start: 2019-01-01 | End: 2019-01-01

## 2019-01-01 RX ORDER — CEFEPIME 1 G/1
1000 INJECTION, POWDER, FOR SOLUTION INTRAMUSCULAR; INTRAVENOUS EVERY 12 HOURS
Refills: 0 | Status: DISCONTINUED | OUTPATIENT
Start: 2019-01-01 | End: 2019-01-01

## 2019-01-01 RX ORDER — TACROLIMUS 5 MG/1
0.5 CAPSULE ORAL EVERY 12 HOURS
Refills: 0 | Status: DISCONTINUED | OUTPATIENT
Start: 2019-01-01 | End: 2019-01-01

## 2019-01-01 RX ORDER — ASCORBIC ACID 60 MG
500 TABLET,CHEWABLE ORAL DAILY
Refills: 0 | Status: DISCONTINUED | OUTPATIENT
Start: 2019-01-01 | End: 2019-01-01

## 2019-01-01 RX ORDER — ACETAMINOPHEN 500 MG
650 TABLET ORAL EVERY 4 HOURS
Refills: 0 | Status: DISCONTINUED | OUTPATIENT
Start: 2019-01-01 | End: 2019-01-01

## 2019-01-01 RX ORDER — ONDANSETRON 8 MG/1
4 TABLET, FILM COATED ORAL EVERY 8 HOURS
Refills: 0 | Status: DISCONTINUED | OUTPATIENT
Start: 2019-01-01 | End: 2019-01-01

## 2019-01-01 RX ORDER — MEPERIDINE HYDROCHLORIDE 50 MG/ML
12.5 INJECTION INTRAMUSCULAR; INTRAVENOUS; SUBCUTANEOUS
Refills: 0 | Status: DISCONTINUED | OUTPATIENT
Start: 2019-01-01 | End: 2019-01-01

## 2019-01-01 RX ORDER — MONTELUKAST 4 MG/1
10 TABLET, CHEWABLE ORAL AT BEDTIME
Refills: 0 | Status: DISCONTINUED | OUTPATIENT
Start: 2019-01-01 | End: 2019-01-01

## 2019-01-01 RX ORDER — SODIUM CHLORIDE 9 MG/ML
2000 INJECTION, SOLUTION INTRAVENOUS ONCE
Refills: 0 | Status: COMPLETED | OUTPATIENT
Start: 2019-01-01 | End: 2019-01-01

## 2019-01-01 RX ORDER — HYDROCORTISONE 1 %
1 OINTMENT (GRAM) TOPICAL
Refills: 0 | Status: DISCONTINUED | OUTPATIENT
Start: 2019-01-01 | End: 2019-01-01

## 2019-01-01 RX ORDER — MORPHINE SULFATE 50 MG/1
4 CAPSULE, EXTENDED RELEASE ORAL
Refills: 0 | Status: DISCONTINUED | OUTPATIENT
Start: 2019-01-01 | End: 2019-01-01

## 2019-01-01 RX ORDER — VANCOMYCIN HCL 1 G
750 VIAL (EA) INTRAVENOUS EVERY 12 HOURS
Refills: 0 | Status: DISCONTINUED | OUTPATIENT
Start: 2019-01-01 | End: 2019-01-01

## 2019-01-01 RX ORDER — POTASSIUM CHLORIDE 20 MEQ
20 PACKET (EA) ORAL DAILY
Refills: 0 | Status: DISCONTINUED | OUTPATIENT
Start: 2019-01-01 | End: 2019-01-01

## 2019-01-01 RX ORDER — MORPHINE SULFATE 50 MG/1
2 CAPSULE, EXTENDED RELEASE ORAL
Refills: 0 | Status: DISCONTINUED | OUTPATIENT
Start: 2019-01-01 | End: 2019-01-01

## 2019-01-01 RX ORDER — GABAPENTIN 400 MG/1
300 CAPSULE ORAL THREE TIMES A DAY
Refills: 0 | Status: DISCONTINUED | OUTPATIENT
Start: 2019-01-01 | End: 2019-01-01

## 2019-01-01 RX ORDER — ENOXAPARIN SODIUM 100 MG/ML
90 INJECTION SUBCUTANEOUS
Refills: 0 | Status: DISCONTINUED | OUTPATIENT
Start: 2019-01-01 | End: 2019-01-01

## 2019-01-01 RX ORDER — VANCOMYCIN HCL 1 G
1000 VIAL (EA) INTRAVENOUS ONCE
Refills: 0 | Status: COMPLETED | OUTPATIENT
Start: 2019-01-01 | End: 2019-01-01

## 2019-01-01 RX ORDER — DOCUSATE SODIUM 100 MG
100 CAPSULE ORAL
Refills: 0 | Status: DISCONTINUED | OUTPATIENT
Start: 2019-01-01 | End: 2019-01-01

## 2019-01-01 RX ORDER — FUROSEMIDE 40 MG
20 TABLET ORAL DAILY
Refills: 0 | Status: DISCONTINUED | OUTPATIENT
Start: 2019-01-01 | End: 2019-01-01

## 2019-01-01 RX ORDER — PANTOPRAZOLE SODIUM 20 MG/1
40 TABLET, DELAYED RELEASE ORAL
Refills: 0 | Status: DISCONTINUED | OUTPATIENT
Start: 2019-01-01 | End: 2019-01-01

## 2019-01-01 RX ORDER — SENNA PLUS 8.6 MG/1
2 TABLET ORAL AT BEDTIME
Refills: 0 | Status: DISCONTINUED | OUTPATIENT
Start: 2019-01-01 | End: 2019-01-01

## 2019-01-01 RX ORDER — FUROSEMIDE 40 MG
1 TABLET ORAL
Qty: 0 | Refills: 0 | DISCHARGE

## 2019-01-01 RX ORDER — POLYETHYLENE GLYCOL 3350 17 G/17G
17 POWDER, FOR SOLUTION ORAL AT BEDTIME
Refills: 0 | Status: DISCONTINUED | OUTPATIENT
Start: 2019-01-01 | End: 2019-01-01

## 2019-01-01 RX ORDER — VANCOMYCIN HCL 1 G
1000 VIAL (EA) INTRAVENOUS EVERY 12 HOURS
Refills: 0 | Status: DISCONTINUED | OUTPATIENT
Start: 2019-01-01 | End: 2019-01-01

## 2019-01-01 RX ORDER — FERROUS SULFATE 325(65) MG
325 TABLET ORAL DAILY
Refills: 0 | Status: DISCONTINUED | OUTPATIENT
Start: 2019-01-01 | End: 2019-01-01

## 2019-01-01 RX ORDER — SODIUM CHLORIDE 9 MG/ML
3 INJECTION INTRAMUSCULAR; INTRAVENOUS; SUBCUTANEOUS EVERY 8 HOURS
Refills: 0 | Status: DISCONTINUED | OUTPATIENT
Start: 2019-01-01 | End: 2019-01-01

## 2019-01-01 RX ORDER — CEPHALEXIN 500 MG/1
500 TABLET ORAL 3 TIMES DAILY
Qty: 21 | Refills: 0 | Status: ACTIVE | COMMUNITY
Start: 2019-01-01 | End: 1900-01-01

## 2019-01-01 RX ORDER — SODIUM CHLORIDE 9 MG/ML
1000 INJECTION, SOLUTION INTRAVENOUS
Refills: 0 | Status: DISCONTINUED | OUTPATIENT
Start: 2019-01-01 | End: 2019-01-01

## 2019-01-01 RX ORDER — WARFARIN SODIUM 2.5 MG/1
2 TABLET ORAL ONCE
Refills: 0 | Status: DISCONTINUED | OUTPATIENT
Start: 2019-01-01 | End: 2019-01-01

## 2019-01-01 RX ORDER — CEFAZOLIN SODIUM 1 G
1000 VIAL (EA) INJECTION EVERY 8 HOURS
Refills: 0 | Status: DISCONTINUED | OUTPATIENT
Start: 2019-01-01 | End: 2019-01-01

## 2019-01-01 RX ORDER — ACETAMINOPHEN 500 MG
650 TABLET ORAL ONCE
Refills: 0 | Status: DISCONTINUED | OUTPATIENT
Start: 2019-01-01 | End: 2019-01-01

## 2019-01-01 RX ORDER — CHLORHEXIDINE GLUCONATE 213 G/1000ML
1 SOLUTION TOPICAL DAILY
Refills: 0 | Status: DISCONTINUED | OUTPATIENT
Start: 2019-01-01 | End: 2019-01-01

## 2019-01-01 RX ORDER — AZTREONAM 2 G
1 VIAL (EA) INJECTION
Qty: 20 | Refills: 0
Start: 2019-01-01 | End: 2019-01-01

## 2019-01-01 RX ORDER — SENNA PLUS 8.6 MG/1
2 TABLET ORAL
Qty: 20 | Refills: 0
Start: 2019-01-01 | End: 2019-01-01

## 2019-01-01 RX ORDER — METOPROLOL TARTRATE 50 MG
50 TABLET ORAL
Refills: 0 | Status: DISCONTINUED | OUTPATIENT
Start: 2019-01-01 | End: 2019-01-01

## 2019-01-01 RX ORDER — TACROLIMUS 5 MG/1
1 CAPSULE ORAL
Qty: 0 | Refills: 0 | DISCHARGE

## 2019-01-01 RX ORDER — OXYCODONE AND ACETAMINOPHEN 5; 325 MG/1; MG/1
1 TABLET ORAL ONCE
Refills: 0 | Status: DISCONTINUED | OUTPATIENT
Start: 2019-01-01 | End: 2019-01-01

## 2019-01-01 RX ORDER — BUDESONIDE, MICRONIZED 100 %
3 POWDER (GRAM) MISCELLANEOUS
Refills: 0 | Status: DISCONTINUED | OUTPATIENT
Start: 2019-01-01 | End: 2019-01-01

## 2019-01-01 RX ORDER — ACETAMINOPHEN 500 MG
650 TABLET ORAL ONCE
Refills: 0 | Status: COMPLETED | OUTPATIENT
Start: 2019-01-01 | End: 2019-01-01

## 2019-01-01 RX ORDER — CEFEPIME 1 G/1
INJECTION, POWDER, FOR SOLUTION INTRAMUSCULAR; INTRAVENOUS
Refills: 0 | Status: DISCONTINUED | OUTPATIENT
Start: 2019-01-01 | End: 2019-01-01

## 2019-01-01 RX ORDER — FUROSEMIDE 40 MG
40 TABLET ORAL DAILY
Refills: 0 | Status: DISCONTINUED | OUTPATIENT
Start: 2019-01-01 | End: 2019-01-01

## 2019-01-01 RX ORDER — ASCORBIC ACID 60 MG
1 TABLET,CHEWABLE ORAL
Qty: 0 | Refills: 0 | DISCHARGE

## 2019-01-01 RX ORDER — METOPROLOL TARTRATE 50 MG
50 TABLET ORAL DAILY
Refills: 0 | Status: DISCONTINUED | OUTPATIENT
Start: 2019-01-01 | End: 2019-01-01

## 2019-01-01 RX ORDER — VANCOMYCIN HCL 1 G
500 VIAL (EA) INTRAVENOUS EVERY 12 HOURS
Refills: 0 | Status: DISCONTINUED | OUTPATIENT
Start: 2019-01-01 | End: 2019-01-01

## 2019-01-01 RX ORDER — POTASSIUM CHLORIDE 20 MEQ
40 PACKET (EA) ORAL DAILY
Refills: 0 | Status: DISCONTINUED | OUTPATIENT
Start: 2019-01-01 | End: 2019-01-01

## 2019-01-01 RX ORDER — ACETAMINOPHEN 500 MG
650 TABLET ORAL EVERY 6 HOURS
Refills: 0 | Status: DISCONTINUED | OUTPATIENT
Start: 2019-01-01 | End: 2019-01-01

## 2019-01-01 RX ORDER — BENZOYL PEROXIDE MICRONIZED 5.8 %
0 TOWELETTE (EA) TOPICAL
Qty: 0 | Refills: 0 | DISCHARGE

## 2019-01-01 RX ORDER — OXYCODONE AND ACETAMINOPHEN 5; 325 MG/1; MG/1
1 TABLET ORAL EVERY 4 HOURS
Refills: 0 | Status: DISCONTINUED | OUTPATIENT
Start: 2019-01-01 | End: 2019-01-01

## 2019-01-01 RX ORDER — AMPICILLIN SODIUM AND SULBACTAM SODIUM 250; 125 MG/ML; MG/ML
3 INJECTION, POWDER, FOR SUSPENSION INTRAMUSCULAR; INTRAVENOUS EVERY 6 HOURS
Refills: 0 | Status: DISCONTINUED | OUTPATIENT
Start: 2019-01-01 | End: 2019-01-01

## 2019-01-01 RX ORDER — MUPIROCIN 20 MG/G
1 OINTMENT TOPICAL
Refills: 0 | Status: DISCONTINUED | OUTPATIENT
Start: 2019-01-01 | End: 2019-01-01

## 2019-01-01 RX ORDER — ONDANSETRON 8 MG/1
4 TABLET, FILM COATED ORAL ONCE
Refills: 0 | Status: DISCONTINUED | OUTPATIENT
Start: 2019-01-01 | End: 2019-01-01

## 2019-01-01 RX ORDER — METRONIDAZOLE 500 MG
500 TABLET ORAL ONCE
Refills: 0 | Status: COMPLETED | OUTPATIENT
Start: 2019-01-01 | End: 2019-01-01

## 2019-01-01 RX ORDER — BUDESONIDE, MICRONIZED 100 %
1 POWDER (GRAM) MISCELLANEOUS
Qty: 0 | Refills: 0 | DISCHARGE

## 2019-01-01 RX ORDER — HYDROMORPHONE HYDROCHLORIDE 2 MG/ML
0.5 INJECTION INTRAMUSCULAR; INTRAVENOUS; SUBCUTANEOUS
Refills: 0 | Status: DISCONTINUED | OUTPATIENT
Start: 2019-01-01 | End: 2019-01-01

## 2019-01-01 RX ORDER — MAGNESIUM SULFATE 500 MG/ML
2 VIAL (ML) INJECTION ONCE
Refills: 0 | Status: COMPLETED | OUTPATIENT
Start: 2019-01-01 | End: 2019-01-01

## 2019-01-01 RX ORDER — WARFARIN SODIUM 2.5 MG/1
1 TABLET ORAL ONCE
Refills: 0 | Status: COMPLETED | OUTPATIENT
Start: 2019-01-01 | End: 2019-01-01

## 2019-01-01 RX ORDER — PREGABALIN 225 MG/1
1 CAPSULE ORAL
Qty: 0 | Refills: 0 | DISCHARGE

## 2019-01-01 RX ORDER — PANTOPRAZOLE SODIUM 20 MG/1
1 TABLET, DELAYED RELEASE ORAL
Qty: 15 | Refills: 0
Start: 2019-01-01 | End: 2019-01-01

## 2019-01-01 RX ORDER — OXYCODONE AND ACETAMINOPHEN 5; 325 MG/1; MG/1
2 TABLET ORAL EVERY 4 HOURS
Refills: 0 | Status: DISCONTINUED | OUTPATIENT
Start: 2019-01-01 | End: 2019-01-01

## 2019-01-01 RX ORDER — PHENYLEPHRINE-SHARK LIVER OIL-MINERAL OIL-PETROLATUM RECTAL OINTMENT
1 OINTMENT (GRAM) RECTAL
Refills: 0 | Status: DISCONTINUED | OUTPATIENT
Start: 2019-01-01 | End: 2019-01-01

## 2019-01-01 RX ORDER — CIPROFLOXACIN LACTATE 400MG/40ML
1 VIAL (ML) INTRAVENOUS
Qty: 14 | Refills: 0
Start: 2019-01-01 | End: 2019-01-01

## 2019-01-01 RX ORDER — MINOCYCLINE HYDROCHLORIDE 45 MG/1
1 TABLET, EXTENDED RELEASE ORAL
Qty: 0 | Refills: 0 | DISCHARGE

## 2019-01-01 RX ORDER — DIBUCAINE 1 %
1 OINTMENT (GRAM) RECTAL
Refills: 0 | Status: DISCONTINUED | OUTPATIENT
Start: 2019-01-01 | End: 2019-01-01

## 2019-01-01 RX ORDER — METRONIDAZOLE 500 MG
500 TABLET ORAL EVERY 8 HOURS
Refills: 0 | Status: DISCONTINUED | OUTPATIENT
Start: 2019-01-01 | End: 2019-01-01

## 2019-01-01 RX ORDER — OXYCODONE AND ACETAMINOPHEN 5; 325 MG/1; MG/1
5-325 TABLET ORAL
Qty: 30 | Refills: 0 | Status: ACTIVE | COMMUNITY
Start: 2019-01-01 | End: 1900-01-01

## 2019-01-01 RX ORDER — ASCORBIC ACID 60 MG
1000 TABLET,CHEWABLE ORAL DAILY
Refills: 0 | Status: DISCONTINUED | OUTPATIENT
Start: 2019-01-01 | End: 2019-01-01

## 2019-01-01 RX ORDER — POTASSIUM CHLORIDE 20 MEQ
40 PACKET (EA) ORAL ONCE
Refills: 0 | Status: COMPLETED | OUTPATIENT
Start: 2019-01-01 | End: 2019-01-01

## 2019-01-01 RX ORDER — POTASSIUM CHLORIDE 20 MEQ
20 PACKET (EA) ORAL
Refills: 0 | Status: COMPLETED | OUTPATIENT
Start: 2019-01-01 | End: 2019-01-01

## 2019-01-01 RX ORDER — SODIUM CHLORIDE 9 MG/ML
1000 INJECTION INTRAMUSCULAR; INTRAVENOUS; SUBCUTANEOUS ONCE
Refills: 0 | Status: COMPLETED | OUTPATIENT
Start: 2019-01-01 | End: 2019-01-01

## 2019-01-01 RX ORDER — OXYCODONE AND ACETAMINOPHEN 5; 325 MG/1; MG/1
1 TABLET ORAL EVERY 6 HOURS
Refills: 0 | Status: DISCONTINUED | OUTPATIENT
Start: 2019-01-01 | End: 2019-01-01

## 2019-01-01 RX ORDER — MORPHINE SULFATE 50 MG/1
4 CAPSULE, EXTENDED RELEASE ORAL ONCE
Refills: 0 | Status: DISCONTINUED | OUTPATIENT
Start: 2019-01-01 | End: 2019-01-01

## 2019-01-01 RX ORDER — MORPHINE SULFATE 50 MG/1
0.5 CAPSULE, EXTENDED RELEASE ORAL ONCE
Refills: 0 | Status: DISCONTINUED | OUTPATIENT
Start: 2019-01-01 | End: 2019-01-01

## 2019-01-01 RX ORDER — ENOXAPARIN SODIUM 100 MG/ML
30 INJECTION SUBCUTANEOUS
Refills: 0 | Status: DISCONTINUED | OUTPATIENT
Start: 2019-01-01 | End: 2019-01-01

## 2019-01-01 RX ORDER — METOCLOPRAMIDE HCL 10 MG
10 TABLET ORAL ONCE
Refills: 0 | Status: COMPLETED | OUTPATIENT
Start: 2019-01-01 | End: 2019-01-01

## 2019-01-01 RX ORDER — FUROSEMIDE 40 MG
0 TABLET ORAL
Qty: 0 | Refills: 0 | DISCHARGE

## 2019-01-01 RX ORDER — CEFEPIME 1 G/1
1000 INJECTION, POWDER, FOR SOLUTION INTRAMUSCULAR; INTRAVENOUS ONCE
Refills: 0 | Status: COMPLETED | OUTPATIENT
Start: 2019-01-01 | End: 2019-01-01

## 2019-01-01 RX ORDER — CLINDAMYCIN HYDROCHLORIDE 300 MG/1
300 CAPSULE ORAL EVERY 6 HOURS
Qty: 28 | Refills: 0 | Status: ACTIVE | COMMUNITY
Start: 2019-01-01 | End: 1900-01-01

## 2019-01-01 RX ORDER — ONDANSETRON 8 MG/1
4 TABLET, FILM COATED ORAL ONCE
Refills: 0 | Status: COMPLETED | OUTPATIENT
Start: 2019-01-01 | End: 2019-01-01

## 2019-01-01 RX ORDER — FERROUS SULFATE 325(65) MG
1 TABLET ORAL
Qty: 0 | Refills: 0 | DISCHARGE

## 2019-01-01 RX ORDER — SODIUM CHLORIDE 9 MG/ML
1000 INJECTION, SOLUTION INTRAVENOUS ONCE
Refills: 0 | Status: COMPLETED | OUTPATIENT
Start: 2019-01-01 | End: 2019-01-01

## 2019-01-01 RX ORDER — BUDESONIDE, MICRONIZED 100 %
0 POWDER (GRAM) MISCELLANEOUS
Qty: 0 | Refills: 0 | DISCHARGE

## 2019-01-01 RX ORDER — WARFARIN SODIUM 2.5 MG/1
3 TABLET ORAL ONCE
Refills: 0 | Status: COMPLETED | OUTPATIENT
Start: 2019-01-01 | End: 2019-01-01

## 2019-01-01 RX ORDER — CEFAZOLIN SODIUM 1 G
2000 VIAL (EA) INJECTION ONCE
Refills: 0 | Status: COMPLETED | OUTPATIENT
Start: 2019-01-01 | End: 2019-01-01

## 2019-01-01 RX ORDER — TETANUS TOXOID, REDUCED DIPHTHERIA TOXOID AND ACELLULAR PERTUSSIS VACCINE, ADSORBED 5; 2.5; 8; 8; 2.5 [IU]/.5ML; [IU]/.5ML; UG/.5ML; UG/.5ML; UG/.5ML
0.5 SUSPENSION INTRAMUSCULAR ONCE
Refills: 0 | Status: COMPLETED | OUTPATIENT
Start: 2019-01-01 | End: 2019-01-01

## 2019-01-01 RX ORDER — WARFARIN SODIUM 2.5 MG/1
3 TABLET ORAL ONCE
Refills: 0 | Status: DISCONTINUED | OUTPATIENT
Start: 2019-01-01 | End: 2019-01-01

## 2019-01-01 RX ORDER — OXYCODONE AND ACETAMINOPHEN 5; 325 MG/1; MG/1
5-325 TABLET ORAL
Qty: 15 | Refills: 0 | Status: ACTIVE | COMMUNITY
Start: 2019-01-01 | End: 1900-01-01

## 2019-01-01 RX ORDER — LACTULOSE 10 G/15ML
30 SOLUTION ORAL ONCE
Refills: 0 | Status: COMPLETED | OUTPATIENT
Start: 2019-01-01 | End: 2019-01-01

## 2019-01-01 RX ORDER — CIPROFLOXACIN LACTATE 400MG/40ML
500 VIAL (ML) INTRAVENOUS EVERY 12 HOURS
Refills: 0 | Status: DISCONTINUED | OUTPATIENT
Start: 2019-01-01 | End: 2019-01-01

## 2019-01-01 RX ORDER — AMPICILLIN SODIUM AND SULBACTAM SODIUM 250; 125 MG/ML; MG/ML
3 INJECTION, POWDER, FOR SUSPENSION INTRAMUSCULAR; INTRAVENOUS ONCE
Refills: 0 | Status: COMPLETED | OUTPATIENT
Start: 2019-01-01 | End: 2019-01-01

## 2019-01-01 RX ORDER — MORPHINE SULFATE 50 MG/1
4 CAPSULE, EXTENDED RELEASE ORAL EVERY 6 HOURS
Refills: 0 | Status: DISCONTINUED | OUTPATIENT
Start: 2019-01-01 | End: 2019-01-01

## 2019-01-01 RX ORDER — RISEDRONATE SODIUM 25.8; 4.2 MG/1; MG/1
1 TABLET, FILM COATED ORAL
Qty: 0 | Refills: 0 | DISCHARGE

## 2019-01-01 RX ADMIN — Medication 100 MILLIGRAM(S): at 05:26

## 2019-01-01 RX ADMIN — TACROLIMUS 0.5 MILLIGRAM(S): 5 CAPSULE ORAL at 05:07

## 2019-01-01 RX ADMIN — TACROLIMUS 0.5 MILLIGRAM(S): 5 CAPSULE ORAL at 17:03

## 2019-01-01 RX ADMIN — SODIUM CHLORIDE 1000 MILLILITER(S): 9 INJECTION, SOLUTION INTRAVENOUS at 13:50

## 2019-01-01 RX ADMIN — Medication 10 MILLIGRAM(S): at 16:56

## 2019-01-01 RX ADMIN — ENOXAPARIN SODIUM 90 MILLIGRAM(S): 100 INJECTION SUBCUTANEOUS at 17:57

## 2019-01-01 RX ADMIN — Medication 40 MILLIEQUIVALENT(S): at 16:58

## 2019-01-01 RX ADMIN — AMLODIPINE BESYLATE 5 MILLIGRAM(S): 2.5 TABLET ORAL at 05:55

## 2019-01-01 RX ADMIN — MORPHINE SULFATE 2 MILLIGRAM(S): 50 CAPSULE, EXTENDED RELEASE ORAL at 20:08

## 2019-01-01 RX ADMIN — TACROLIMUS 0.5 MILLIGRAM(S): 5 CAPSULE ORAL at 06:16

## 2019-01-01 RX ADMIN — TACROLIMUS 0.5 MILLIGRAM(S): 5 CAPSULE ORAL at 17:34

## 2019-01-01 RX ADMIN — Medication 20 MILLIEQUIVALENT(S): at 04:44

## 2019-01-01 RX ADMIN — Medication 50 MILLIGRAM(S): at 05:27

## 2019-01-01 RX ADMIN — GABAPENTIN 300 MILLIGRAM(S): 400 CAPSULE ORAL at 05:24

## 2019-01-01 RX ADMIN — Medication 20 MILLIEQUIVALENT(S): at 11:03

## 2019-01-01 RX ADMIN — GABAPENTIN 300 MILLIGRAM(S): 400 CAPSULE ORAL at 21:18

## 2019-01-01 RX ADMIN — GABAPENTIN 300 MILLIGRAM(S): 400 CAPSULE ORAL at 06:52

## 2019-01-01 RX ADMIN — Medication 1 APPLICATION(S): at 21:39

## 2019-01-01 RX ADMIN — Medication 40 MILLIGRAM(S): at 05:18

## 2019-01-01 RX ADMIN — PANTOPRAZOLE SODIUM 40 MILLIGRAM(S): 20 TABLET, DELAYED RELEASE ORAL at 06:17

## 2019-01-01 RX ADMIN — TACROLIMUS 0.5 MILLIGRAM(S): 5 CAPSULE ORAL at 17:29

## 2019-01-01 RX ADMIN — Medication 1 APPLICATION(S): at 12:44

## 2019-01-01 RX ADMIN — Medication 100 MILLIGRAM(S): at 14:48

## 2019-01-01 RX ADMIN — Medication 325 MILLIGRAM(S): at 11:08

## 2019-01-01 RX ADMIN — GABAPENTIN 300 MILLIGRAM(S): 400 CAPSULE ORAL at 05:18

## 2019-01-01 RX ADMIN — SODIUM CHLORIDE 3 MILLILITER(S): 9 INJECTION INTRAMUSCULAR; INTRAVENOUS; SUBCUTANEOUS at 05:08

## 2019-01-01 RX ADMIN — CEFEPIME 100 MILLIGRAM(S): 1 INJECTION, POWDER, FOR SOLUTION INTRAMUSCULAR; INTRAVENOUS at 17:27

## 2019-01-01 RX ADMIN — GABAPENTIN 300 MILLIGRAM(S): 400 CAPSULE ORAL at 13:24

## 2019-01-01 RX ADMIN — MORPHINE SULFATE 2 MILLIGRAM(S): 50 CAPSULE, EXTENDED RELEASE ORAL at 06:11

## 2019-01-01 RX ADMIN — Medication 650 MILLIGRAM(S): at 22:10

## 2019-01-01 RX ADMIN — Medication 500 MILLIGRAM(S): at 15:06

## 2019-01-01 RX ADMIN — AMLODIPINE BESYLATE 5 MILLIGRAM(S): 2.5 TABLET ORAL at 06:11

## 2019-01-01 RX ADMIN — OXYCODONE AND ACETAMINOPHEN 1 TABLET(S): 5; 325 TABLET ORAL at 12:35

## 2019-01-01 RX ADMIN — CEFEPIME 100 MILLIGRAM(S): 1 INJECTION, POWDER, FOR SOLUTION INTRAMUSCULAR; INTRAVENOUS at 17:42

## 2019-01-01 RX ADMIN — MONTELUKAST 10 MILLIGRAM(S): 4 TABLET, CHEWABLE ORAL at 21:23

## 2019-01-01 RX ADMIN — GABAPENTIN 300 MILLIGRAM(S): 400 CAPSULE ORAL at 15:31

## 2019-01-01 RX ADMIN — Medication 50 MILLIGRAM(S): at 06:11

## 2019-01-01 RX ADMIN — Medication 100 MILLIGRAM(S): at 06:37

## 2019-01-01 RX ADMIN — AMLODIPINE BESYLATE 5 MILLIGRAM(S): 2.5 TABLET ORAL at 05:46

## 2019-01-01 RX ADMIN — OXYCODONE AND ACETAMINOPHEN 2 TABLET(S): 5; 325 TABLET ORAL at 08:40

## 2019-01-01 RX ADMIN — OXYCODONE AND ACETAMINOPHEN 1 TABLET(S): 5; 325 TABLET ORAL at 18:47

## 2019-01-01 RX ADMIN — Medication 20 MILLIGRAM(S): at 06:52

## 2019-01-01 RX ADMIN — CHLORHEXIDINE GLUCONATE 1 APPLICATION(S): 213 SOLUTION TOPICAL at 06:37

## 2019-01-01 RX ADMIN — MORPHINE SULFATE 2 MILLIGRAM(S): 50 CAPSULE, EXTENDED RELEASE ORAL at 19:34

## 2019-01-01 RX ADMIN — Medication 1000 MILLIGRAM(S): at 11:15

## 2019-01-01 RX ADMIN — Medication 50 MILLIGRAM(S): at 05:34

## 2019-01-01 RX ADMIN — MORPHINE SULFATE 2 MILLIGRAM(S): 50 CAPSULE, EXTENDED RELEASE ORAL at 07:28

## 2019-01-01 RX ADMIN — MONTELUKAST 10 MILLIGRAM(S): 4 TABLET, CHEWABLE ORAL at 21:34

## 2019-01-01 RX ADMIN — TACROLIMUS 0.5 MILLIGRAM(S): 5 CAPSULE ORAL at 05:47

## 2019-01-01 RX ADMIN — SODIUM CHLORIDE 100 MILLILITER(S): 9 INJECTION, SOLUTION INTRAVENOUS at 17:06

## 2019-01-01 RX ADMIN — AMLODIPINE BESYLATE 5 MILLIGRAM(S): 2.5 TABLET ORAL at 05:17

## 2019-01-01 RX ADMIN — Medication 100 MILLIGRAM(S): at 05:25

## 2019-01-01 RX ADMIN — OXYCODONE AND ACETAMINOPHEN 2 TABLET(S): 5; 325 TABLET ORAL at 14:30

## 2019-01-01 RX ADMIN — Medication 250 MILLIGRAM(S): at 17:28

## 2019-01-01 RX ADMIN — Medication 10 MILLIGRAM(S): at 06:11

## 2019-01-01 RX ADMIN — MORPHINE SULFATE 2 MILLIGRAM(S): 50 CAPSULE, EXTENDED RELEASE ORAL at 06:03

## 2019-01-01 RX ADMIN — LACTULOSE 30 GRAM(S): 10 SOLUTION ORAL at 11:09

## 2019-01-01 RX ADMIN — MORPHINE SULFATE 2 MILLIGRAM(S): 50 CAPSULE, EXTENDED RELEASE ORAL at 18:45

## 2019-01-01 RX ADMIN — GABAPENTIN 300 MILLIGRAM(S): 400 CAPSULE ORAL at 22:47

## 2019-01-01 RX ADMIN — TACROLIMUS 0.5 MILLIGRAM(S): 5 CAPSULE ORAL at 18:45

## 2019-01-01 RX ADMIN — SODIUM CHLORIDE 3 MILLILITER(S): 9 INJECTION INTRAMUSCULAR; INTRAVENOUS; SUBCUTANEOUS at 14:10

## 2019-01-01 RX ADMIN — Medication 500 MILLIGRAM(S): at 12:36

## 2019-01-01 RX ADMIN — OXYCODONE AND ACETAMINOPHEN 1 TABLET(S): 5; 325 TABLET ORAL at 23:40

## 2019-01-01 RX ADMIN — MORPHINE SULFATE 2 MILLIGRAM(S): 50 CAPSULE, EXTENDED RELEASE ORAL at 05:17

## 2019-01-01 RX ADMIN — Medication 50 GRAM(S): at 21:55

## 2019-01-01 RX ADMIN — Medication 250 MILLIGRAM(S): at 17:08

## 2019-01-01 RX ADMIN — PANTOPRAZOLE SODIUM 40 MILLIGRAM(S): 20 TABLET, DELAYED RELEASE ORAL at 06:08

## 2019-01-01 RX ADMIN — OXYCODONE AND ACETAMINOPHEN 1 TABLET(S): 5; 325 TABLET ORAL at 13:15

## 2019-01-01 RX ADMIN — Medication 325 MILLIGRAM(S): at 11:15

## 2019-01-01 RX ADMIN — GABAPENTIN 300 MILLIGRAM(S): 400 CAPSULE ORAL at 06:18

## 2019-01-01 RX ADMIN — Medication 325 MILLIGRAM(S): at 15:05

## 2019-01-01 RX ADMIN — TACROLIMUS 0.5 MILLIGRAM(S): 5 CAPSULE ORAL at 05:55

## 2019-01-01 RX ADMIN — GABAPENTIN 300 MILLIGRAM(S): 400 CAPSULE ORAL at 05:14

## 2019-01-01 RX ADMIN — Medication 250 MILLIGRAM(S): at 17:15

## 2019-01-01 RX ADMIN — Medication 100 MILLIGRAM(S): at 15:05

## 2019-01-01 RX ADMIN — CEFEPIME 100 MILLIGRAM(S): 1 INJECTION, POWDER, FOR SOLUTION INTRAMUSCULAR; INTRAVENOUS at 05:24

## 2019-01-01 RX ADMIN — OXYCODONE AND ACETAMINOPHEN 2 TABLET(S): 5; 325 TABLET ORAL at 17:26

## 2019-01-01 RX ADMIN — Medication 100 MILLIGRAM(S): at 13:36

## 2019-01-01 RX ADMIN — Medication 10 MILLIGRAM(S): at 06:29

## 2019-01-01 RX ADMIN — SODIUM CHLORIDE 3 MILLILITER(S): 9 INJECTION INTRAMUSCULAR; INTRAVENOUS; SUBCUTANEOUS at 22:11

## 2019-01-01 RX ADMIN — GABAPENTIN 300 MILLIGRAM(S): 400 CAPSULE ORAL at 14:59

## 2019-01-01 RX ADMIN — SODIUM CHLORIDE 3 MILLILITER(S): 9 INJECTION INTRAMUSCULAR; INTRAVENOUS; SUBCUTANEOUS at 05:18

## 2019-01-01 RX ADMIN — Medication 100 MILLIGRAM(S): at 06:29

## 2019-01-01 RX ADMIN — WARFARIN SODIUM 2 MILLIGRAM(S): 2.5 TABLET ORAL at 21:34

## 2019-01-01 RX ADMIN — SODIUM CHLORIDE 100 MILLILITER(S): 9 INJECTION, SOLUTION INTRAVENOUS at 06:02

## 2019-01-01 RX ADMIN — GABAPENTIN 300 MILLIGRAM(S): 400 CAPSULE ORAL at 21:59

## 2019-01-01 RX ADMIN — Medication 40 MILLIEQUIVALENT(S): at 11:33

## 2019-01-01 RX ADMIN — CHLORHEXIDINE GLUCONATE 1 APPLICATION(S): 213 SOLUTION TOPICAL at 09:30

## 2019-01-01 RX ADMIN — CHLORHEXIDINE GLUCONATE 1 APPLICATION(S): 213 SOLUTION TOPICAL at 05:18

## 2019-01-01 RX ADMIN — Medication 100 MILLIGRAM(S): at 14:59

## 2019-01-01 RX ADMIN — MONTELUKAST 10 MILLIGRAM(S): 4 TABLET, CHEWABLE ORAL at 23:45

## 2019-01-01 RX ADMIN — Medication 325 MILLIGRAM(S): at 12:04

## 2019-01-01 RX ADMIN — MORPHINE SULFATE 2 MILLIGRAM(S): 50 CAPSULE, EXTENDED RELEASE ORAL at 22:00

## 2019-01-01 RX ADMIN — Medication 325 MILLIGRAM(S): at 11:38

## 2019-01-01 RX ADMIN — Medication 100 MILLIGRAM(S): at 18:46

## 2019-01-01 RX ADMIN — MORPHINE SULFATE 2 MILLIGRAM(S): 50 CAPSULE, EXTENDED RELEASE ORAL at 15:05

## 2019-01-01 RX ADMIN — Medication 250 MILLIGRAM(S): at 06:15

## 2019-01-01 RX ADMIN — MUPIROCIN 1 APPLICATION(S): 20 OINTMENT TOPICAL at 17:17

## 2019-01-01 RX ADMIN — Medication 100 MILLIGRAM(S): at 17:18

## 2019-01-01 RX ADMIN — AMLODIPINE BESYLATE 5 MILLIGRAM(S): 2.5 TABLET ORAL at 05:40

## 2019-01-01 RX ADMIN — Medication 250 MILLIGRAM(S): at 17:46

## 2019-01-01 RX ADMIN — PREGABALIN 1000 MICROGRAM(S): 225 CAPSULE ORAL at 11:03

## 2019-01-01 RX ADMIN — GABAPENTIN 300 MILLIGRAM(S): 400 CAPSULE ORAL at 05:59

## 2019-01-01 RX ADMIN — PREGABALIN 1000 MICROGRAM(S): 225 CAPSULE ORAL at 11:33

## 2019-01-01 RX ADMIN — Medication 500 MILLIGRAM(S): at 13:55

## 2019-01-01 RX ADMIN — CEFEPIME 100 MILLIGRAM(S): 1 INJECTION, POWDER, FOR SOLUTION INTRAMUSCULAR; INTRAVENOUS at 17:02

## 2019-01-01 RX ADMIN — Medication 500 MILLIGRAM(S): at 07:02

## 2019-01-01 RX ADMIN — Medication 20 MILLIGRAM(S): at 05:08

## 2019-01-01 RX ADMIN — MONTELUKAST 10 MILLIGRAM(S): 4 TABLET, CHEWABLE ORAL at 22:02

## 2019-01-01 RX ADMIN — Medication 500 MILLIGRAM(S): at 12:00

## 2019-01-01 RX ADMIN — MORPHINE SULFATE 4 MILLIGRAM(S): 50 CAPSULE, EXTENDED RELEASE ORAL at 11:37

## 2019-01-01 RX ADMIN — Medication 1 APPLICATION(S): at 17:30

## 2019-01-01 RX ADMIN — Medication 10 MILLIGRAM(S): at 06:44

## 2019-01-01 RX ADMIN — Medication 40 MILLIGRAM(S): at 21:59

## 2019-01-01 RX ADMIN — MORPHINE SULFATE 2 MILLIGRAM(S): 50 CAPSULE, EXTENDED RELEASE ORAL at 05:38

## 2019-01-01 RX ADMIN — Medication 40 MILLIEQUIVALENT(S): at 12:00

## 2019-01-01 RX ADMIN — OXYCODONE AND ACETAMINOPHEN 2 TABLET(S): 5; 325 TABLET ORAL at 09:17

## 2019-01-01 RX ADMIN — MUPIROCIN 1 APPLICATION(S): 20 OINTMENT TOPICAL at 06:45

## 2019-01-01 RX ADMIN — Medication 500 MILLIGRAM(S): at 12:06

## 2019-01-01 RX ADMIN — GABAPENTIN 300 MILLIGRAM(S): 400 CAPSULE ORAL at 22:09

## 2019-01-01 RX ADMIN — Medication 1 APPLICATION(S): at 21:17

## 2019-01-01 RX ADMIN — PREGABALIN 1000 MICROGRAM(S): 225 CAPSULE ORAL at 12:48

## 2019-01-01 RX ADMIN — GABAPENTIN 300 MILLIGRAM(S): 400 CAPSULE ORAL at 13:37

## 2019-01-01 RX ADMIN — SODIUM CHLORIDE 3 MILLILITER(S): 9 INJECTION INTRAMUSCULAR; INTRAVENOUS; SUBCUTANEOUS at 05:37

## 2019-01-01 RX ADMIN — AMPICILLIN SODIUM AND SULBACTAM SODIUM 200 GRAM(S): 250; 125 INJECTION, POWDER, FOR SUSPENSION INTRAMUSCULAR; INTRAVENOUS at 06:19

## 2019-01-01 RX ADMIN — Medication 50 MILLIGRAM(S): at 05:24

## 2019-01-01 RX ADMIN — GABAPENTIN 300 MILLIGRAM(S): 400 CAPSULE ORAL at 21:42

## 2019-01-01 RX ADMIN — SODIUM CHLORIDE 100 MILLILITER(S): 9 INJECTION, SOLUTION INTRAVENOUS at 13:27

## 2019-01-01 RX ADMIN — GABAPENTIN 300 MILLIGRAM(S): 400 CAPSULE ORAL at 05:07

## 2019-01-01 RX ADMIN — TACROLIMUS 0.5 MILLIGRAM(S): 5 CAPSULE ORAL at 05:17

## 2019-01-01 RX ADMIN — AMLODIPINE BESYLATE 5 MILLIGRAM(S): 2.5 TABLET ORAL at 05:51

## 2019-01-01 RX ADMIN — Medication 500 MILLIGRAM(S): at 15:32

## 2019-01-01 RX ADMIN — Medication 40 MILLIGRAM(S): at 05:51

## 2019-01-01 RX ADMIN — TACROLIMUS 0.5 MILLIGRAM(S): 5 CAPSULE ORAL at 17:43

## 2019-01-01 RX ADMIN — MORPHINE SULFATE 4 MILLIGRAM(S): 50 CAPSULE, EXTENDED RELEASE ORAL at 13:20

## 2019-01-01 RX ADMIN — MONTELUKAST 10 MILLIGRAM(S): 4 TABLET, CHEWABLE ORAL at 21:59

## 2019-01-01 RX ADMIN — MONTELUKAST 10 MILLIGRAM(S): 4 TABLET, CHEWABLE ORAL at 21:44

## 2019-01-01 RX ADMIN — AMLODIPINE BESYLATE 5 MILLIGRAM(S): 2.5 TABLET ORAL at 05:48

## 2019-01-01 RX ADMIN — Medication 100 MILLIGRAM(S): at 07:09

## 2019-01-01 RX ADMIN — MUPIROCIN 1 APPLICATION(S): 20 OINTMENT TOPICAL at 06:33

## 2019-01-01 RX ADMIN — Medication 100 MILLIGRAM(S): at 22:09

## 2019-01-01 RX ADMIN — MONTELUKAST 10 MILLIGRAM(S): 4 TABLET, CHEWABLE ORAL at 22:11

## 2019-01-01 RX ADMIN — AMPICILLIN SODIUM AND SULBACTAM SODIUM 200 GRAM(S): 250; 125 INJECTION, POWDER, FOR SUSPENSION INTRAMUSCULAR; INTRAVENOUS at 23:36

## 2019-01-01 RX ADMIN — GABAPENTIN 300 MILLIGRAM(S): 400 CAPSULE ORAL at 21:34

## 2019-01-01 RX ADMIN — MORPHINE SULFATE 2 MILLIGRAM(S): 50 CAPSULE, EXTENDED RELEASE ORAL at 23:09

## 2019-01-01 RX ADMIN — WARFARIN SODIUM 2 MILLIGRAM(S): 2.5 TABLET ORAL at 01:44

## 2019-01-01 RX ADMIN — Medication 1 APPLICATION(S): at 14:00

## 2019-01-01 RX ADMIN — Medication 50 MILLIGRAM(S): at 05:08

## 2019-01-01 RX ADMIN — GABAPENTIN 300 MILLIGRAM(S): 400 CAPSULE ORAL at 21:17

## 2019-01-01 RX ADMIN — CEFEPIME 100 MILLIGRAM(S): 1 INJECTION, POWDER, FOR SOLUTION INTRAMUSCULAR; INTRAVENOUS at 05:15

## 2019-01-01 RX ADMIN — SODIUM CHLORIDE 3 MILLILITER(S): 9 INJECTION INTRAMUSCULAR; INTRAVENOUS; SUBCUTANEOUS at 22:38

## 2019-01-01 RX ADMIN — Medication 10 MILLIGRAM(S): at 06:53

## 2019-01-01 RX ADMIN — MORPHINE SULFATE 2 MILLIGRAM(S): 50 CAPSULE, EXTENDED RELEASE ORAL at 09:40

## 2019-01-01 RX ADMIN — OXYCODONE AND ACETAMINOPHEN 1 TABLET(S): 5; 325 TABLET ORAL at 07:39

## 2019-01-01 RX ADMIN — MONTELUKAST 10 MILLIGRAM(S): 4 TABLET, CHEWABLE ORAL at 22:10

## 2019-01-01 RX ADMIN — Medication 100 MILLIGRAM(S): at 21:17

## 2019-01-01 RX ADMIN — GABAPENTIN 300 MILLIGRAM(S): 400 CAPSULE ORAL at 22:07

## 2019-01-01 RX ADMIN — ENOXAPARIN SODIUM 90 MILLIGRAM(S): 100 INJECTION SUBCUTANEOUS at 07:01

## 2019-01-01 RX ADMIN — MORPHINE SULFATE 2 MILLIGRAM(S): 50 CAPSULE, EXTENDED RELEASE ORAL at 09:30

## 2019-01-01 RX ADMIN — Medication 50 MILLIGRAM(S): at 05:55

## 2019-01-01 RX ADMIN — WARFARIN SODIUM 2 MILLIGRAM(S): 2.5 TABLET ORAL at 21:48

## 2019-01-01 RX ADMIN — Medication 250 MILLIGRAM(S): at 13:52

## 2019-01-01 RX ADMIN — Medication 40 MILLIGRAM(S): at 05:06

## 2019-01-01 RX ADMIN — SODIUM CHLORIDE 3 MILLILITER(S): 9 INJECTION INTRAMUSCULAR; INTRAVENOUS; SUBCUTANEOUS at 05:19

## 2019-01-01 RX ADMIN — Medication 50 MILLIGRAM(S): at 05:48

## 2019-01-01 RX ADMIN — OXYCODONE AND ACETAMINOPHEN 2 TABLET(S): 5; 325 TABLET ORAL at 09:45

## 2019-01-01 RX ADMIN — Medication 325 MILLIGRAM(S): at 12:00

## 2019-01-01 RX ADMIN — GABAPENTIN 300 MILLIGRAM(S): 400 CAPSULE ORAL at 05:26

## 2019-01-01 RX ADMIN — Medication 100 MILLIGRAM(S): at 05:40

## 2019-01-01 RX ADMIN — AMPICILLIN SODIUM AND SULBACTAM SODIUM 200 GRAM(S): 250; 125 INJECTION, POWDER, FOR SUSPENSION INTRAMUSCULAR; INTRAVENOUS at 23:31

## 2019-01-01 RX ADMIN — GABAPENTIN 300 MILLIGRAM(S): 400 CAPSULE ORAL at 21:39

## 2019-01-01 RX ADMIN — Medication 40 MILLIGRAM(S): at 05:24

## 2019-01-01 RX ADMIN — GABAPENTIN 300 MILLIGRAM(S): 400 CAPSULE ORAL at 21:49

## 2019-01-01 RX ADMIN — OXYCODONE AND ACETAMINOPHEN 1 TABLET(S): 5; 325 TABLET ORAL at 10:46

## 2019-01-01 RX ADMIN — Medication 500 MILLIGRAM(S): at 11:03

## 2019-01-01 RX ADMIN — Medication 250 MILLIGRAM(S): at 17:42

## 2019-01-01 RX ADMIN — TACROLIMUS 0.5 MILLIGRAM(S): 5 CAPSULE ORAL at 05:24

## 2019-01-01 RX ADMIN — MORPHINE SULFATE 2 MILLIGRAM(S): 50 CAPSULE, EXTENDED RELEASE ORAL at 11:07

## 2019-01-01 RX ADMIN — Medication 250 MILLIGRAM(S): at 05:16

## 2019-01-01 RX ADMIN — Medication 100 MILLIGRAM(S): at 06:30

## 2019-01-01 RX ADMIN — MORPHINE SULFATE 2 MILLIGRAM(S): 50 CAPSULE, EXTENDED RELEASE ORAL at 05:39

## 2019-01-01 RX ADMIN — GABAPENTIN 300 MILLIGRAM(S): 400 CAPSULE ORAL at 23:44

## 2019-01-01 RX ADMIN — Medication 250 MILLIGRAM(S): at 06:00

## 2019-01-01 RX ADMIN — MONTELUKAST 10 MILLIGRAM(S): 4 TABLET, CHEWABLE ORAL at 21:42

## 2019-01-01 RX ADMIN — SODIUM CHLORIDE 100 MILLILITER(S): 9 INJECTION, SOLUTION INTRAVENOUS at 00:07

## 2019-01-01 RX ADMIN — CHLORHEXIDINE GLUCONATE 1 APPLICATION(S): 213 SOLUTION TOPICAL at 12:42

## 2019-01-01 RX ADMIN — Medication 1000 MILLIGRAM(S): at 14:59

## 2019-01-01 RX ADMIN — CHLORHEXIDINE GLUCONATE 1 APPLICATION(S): 213 SOLUTION TOPICAL at 05:52

## 2019-01-01 RX ADMIN — Medication 10 MILLIGRAM(S): at 05:40

## 2019-01-01 RX ADMIN — MONTELUKAST 10 MILLIGRAM(S): 4 TABLET, CHEWABLE ORAL at 22:01

## 2019-01-01 RX ADMIN — TACROLIMUS 0.5 MILLIGRAM(S): 5 CAPSULE ORAL at 17:26

## 2019-01-01 RX ADMIN — GABAPENTIN 300 MILLIGRAM(S): 400 CAPSULE ORAL at 05:27

## 2019-01-01 RX ADMIN — Medication 250 MILLIGRAM(S): at 11:00

## 2019-01-01 RX ADMIN — Medication 3 MILLIGRAM(S): at 06:52

## 2019-01-01 RX ADMIN — TACROLIMUS 0.5 MILLIGRAM(S): 5 CAPSULE ORAL at 05:26

## 2019-01-01 RX ADMIN — GABAPENTIN 300 MILLIGRAM(S): 400 CAPSULE ORAL at 13:40

## 2019-01-01 RX ADMIN — TACROLIMUS 0.5 MILLIGRAM(S): 5 CAPSULE ORAL at 05:15

## 2019-01-01 RX ADMIN — Medication 100 MILLIGRAM(S): at 14:40

## 2019-01-01 RX ADMIN — GABAPENTIN 300 MILLIGRAM(S): 400 CAPSULE ORAL at 15:04

## 2019-01-01 RX ADMIN — Medication 40 MILLIGRAM(S): at 06:29

## 2019-01-01 RX ADMIN — GABAPENTIN 300 MILLIGRAM(S): 400 CAPSULE ORAL at 14:09

## 2019-01-01 RX ADMIN — Medication 50 MILLIGRAM(S): at 05:14

## 2019-01-01 RX ADMIN — Medication 100 MILLIGRAM(S): at 13:24

## 2019-01-01 RX ADMIN — Medication 100 MILLIGRAM(S): at 18:57

## 2019-01-01 RX ADMIN — Medication 1 APPLICATION(S): at 21:49

## 2019-01-01 RX ADMIN — CHLORHEXIDINE GLUCONATE 1 APPLICATION(S): 213 SOLUTION TOPICAL at 05:06

## 2019-01-01 RX ADMIN — Medication 1 APPLICATION(S): at 09:30

## 2019-01-01 RX ADMIN — CHLORHEXIDINE GLUCONATE 1 APPLICATION(S): 213 SOLUTION TOPICAL at 05:48

## 2019-01-01 RX ADMIN — TACROLIMUS 0.5 MILLIGRAM(S): 5 CAPSULE ORAL at 06:29

## 2019-01-01 RX ADMIN — AMLODIPINE BESYLATE 5 MILLIGRAM(S): 2.5 TABLET ORAL at 05:14

## 2019-01-01 RX ADMIN — ONDANSETRON 4 MILLIGRAM(S): 8 TABLET, FILM COATED ORAL at 08:21

## 2019-01-01 RX ADMIN — OXYCODONE AND ACETAMINOPHEN 2 TABLET(S): 5; 325 TABLET ORAL at 10:45

## 2019-01-01 RX ADMIN — Medication 250 MILLIGRAM(S): at 20:03

## 2019-01-01 RX ADMIN — Medication 1 APPLICATION(S): at 11:39

## 2019-01-01 RX ADMIN — SODIUM CHLORIDE 3 MILLILITER(S): 9 INJECTION INTRAMUSCULAR; INTRAVENOUS; SUBCUTANEOUS at 21:00

## 2019-01-01 RX ADMIN — Medication 250 MILLIGRAM(S): at 05:58

## 2019-01-01 RX ADMIN — TACROLIMUS 0.5 MILLIGRAM(S): 5 CAPSULE ORAL at 18:08

## 2019-01-01 RX ADMIN — SODIUM CHLORIDE 3 MILLILITER(S): 9 INJECTION INTRAMUSCULAR; INTRAVENOUS; SUBCUTANEOUS at 16:54

## 2019-01-01 RX ADMIN — AMLODIPINE BESYLATE 5 MILLIGRAM(S): 2.5 TABLET ORAL at 06:37

## 2019-01-01 RX ADMIN — AMLODIPINE BESYLATE 5 MILLIGRAM(S): 2.5 TABLET ORAL at 05:35

## 2019-01-01 RX ADMIN — Medication 100 MILLIGRAM(S): at 05:45

## 2019-01-01 RX ADMIN — CEFEPIME 100 MILLIGRAM(S): 1 INJECTION, POWDER, FOR SOLUTION INTRAMUSCULAR; INTRAVENOUS at 17:15

## 2019-01-01 RX ADMIN — Medication 100 MILLIGRAM(S): at 06:44

## 2019-01-01 RX ADMIN — Medication 20 MILLIEQUIVALENT(S): at 14:48

## 2019-01-01 RX ADMIN — Medication 325 MILLIGRAM(S): at 12:36

## 2019-01-01 RX ADMIN — Medication 1 TABLET(S): at 18:45

## 2019-01-01 RX ADMIN — Medication 300 MILLIGRAM(S): at 14:13

## 2019-01-01 RX ADMIN — MORPHINE SULFATE 2 MILLIGRAM(S): 50 CAPSULE, EXTENDED RELEASE ORAL at 11:26

## 2019-01-01 RX ADMIN — Medication 1 APPLICATION(S): at 20:03

## 2019-01-01 RX ADMIN — TETANUS TOXOID, REDUCED DIPHTHERIA TOXOID AND ACELLULAR PERTUSSIS VACCINE, ADSORBED 0.5 MILLILITER(S): 5; 2.5; 8; 8; 2.5 SUSPENSION INTRAMUSCULAR at 12:44

## 2019-01-01 RX ADMIN — PANTOPRAZOLE SODIUM 40 MILLIGRAM(S): 20 TABLET, DELAYED RELEASE ORAL at 05:24

## 2019-01-01 RX ADMIN — MORPHINE SULFATE 4 MILLIGRAM(S): 50 CAPSULE, EXTENDED RELEASE ORAL at 11:51

## 2019-01-01 RX ADMIN — ONDANSETRON 4 MILLIGRAM(S): 8 TABLET, FILM COATED ORAL at 07:28

## 2019-01-01 RX ADMIN — TACROLIMUS 0.5 MILLIGRAM(S): 5 CAPSULE ORAL at 06:55

## 2019-01-01 RX ADMIN — OXYCODONE AND ACETAMINOPHEN 1 TABLET(S): 5; 325 TABLET ORAL at 08:02

## 2019-01-01 RX ADMIN — SODIUM CHLORIDE 3 MILLILITER(S): 9 INJECTION INTRAMUSCULAR; INTRAVENOUS; SUBCUTANEOUS at 17:35

## 2019-01-01 RX ADMIN — CEFEPIME 100 MILLIGRAM(S): 1 INJECTION, POWDER, FOR SOLUTION INTRAMUSCULAR; INTRAVENOUS at 17:28

## 2019-01-01 RX ADMIN — OXYCODONE AND ACETAMINOPHEN 2 TABLET(S): 5; 325 TABLET ORAL at 07:13

## 2019-01-01 RX ADMIN — PREGABALIN 1000 MICROGRAM(S): 225 CAPSULE ORAL at 15:30

## 2019-01-01 RX ADMIN — TACROLIMUS 0.5 MILLIGRAM(S): 5 CAPSULE ORAL at 18:17

## 2019-01-01 RX ADMIN — Medication 100 MILLIGRAM(S): at 13:52

## 2019-01-01 RX ADMIN — TACROLIMUS 0.5 MILLIGRAM(S): 5 CAPSULE ORAL at 05:59

## 2019-01-01 RX ADMIN — Medication 100 MILLIGRAM(S): at 05:15

## 2019-01-01 RX ADMIN — TACROLIMUS 0.5 MILLIGRAM(S): 5 CAPSULE ORAL at 17:16

## 2019-01-01 RX ADMIN — SODIUM CHLORIDE 100 MILLILITER(S): 9 INJECTION, SOLUTION INTRAVENOUS at 13:32

## 2019-01-01 RX ADMIN — Medication 20 MILLIEQUIVALENT(S): at 06:18

## 2019-01-01 RX ADMIN — GABAPENTIN 300 MILLIGRAM(S): 400 CAPSULE ORAL at 21:48

## 2019-01-01 RX ADMIN — Medication 50 MILLIGRAM(S): at 06:15

## 2019-01-01 RX ADMIN — Medication 500 MILLIGRAM(S): at 12:48

## 2019-01-01 RX ADMIN — SODIUM CHLORIDE 3 MILLILITER(S): 9 INJECTION INTRAMUSCULAR; INTRAVENOUS; SUBCUTANEOUS at 05:53

## 2019-01-01 RX ADMIN — Medication 100 MILLIGRAM(S): at 16:36

## 2019-01-01 RX ADMIN — SODIUM CHLORIDE 3 MILLILITER(S): 9 INJECTION INTRAMUSCULAR; INTRAVENOUS; SUBCUTANEOUS at 05:57

## 2019-01-01 RX ADMIN — Medication 40 MILLIGRAM(S): at 06:44

## 2019-01-01 RX ADMIN — Medication 10 MILLIGRAM(S): at 05:51

## 2019-01-01 RX ADMIN — Medication 3 MILLIGRAM(S): at 07:03

## 2019-01-01 RX ADMIN — TACROLIMUS 0.5 MILLIGRAM(S): 5 CAPSULE ORAL at 17:44

## 2019-01-01 RX ADMIN — GABAPENTIN 300 MILLIGRAM(S): 400 CAPSULE ORAL at 13:55

## 2019-01-01 RX ADMIN — Medication 3 MILLIGRAM(S): at 19:16

## 2019-01-01 RX ADMIN — Medication 50 MILLIGRAM(S): at 17:01

## 2019-01-01 RX ADMIN — MORPHINE SULFATE 2 MILLIGRAM(S): 50 CAPSULE, EXTENDED RELEASE ORAL at 06:20

## 2019-01-01 RX ADMIN — SODIUM CHLORIDE 3 MILLILITER(S): 9 INJECTION INTRAMUSCULAR; INTRAVENOUS; SUBCUTANEOUS at 13:52

## 2019-01-01 RX ADMIN — SODIUM CHLORIDE 2000 MILLILITER(S): 9 INJECTION, SOLUTION INTRAVENOUS at 14:41

## 2019-01-01 RX ADMIN — MORPHINE SULFATE 2 MILLIGRAM(S): 50 CAPSULE, EXTENDED RELEASE ORAL at 08:45

## 2019-01-01 RX ADMIN — GABAPENTIN 300 MILLIGRAM(S): 400 CAPSULE ORAL at 05:51

## 2019-01-01 RX ADMIN — SODIUM CHLORIDE 3 MILLILITER(S): 9 INJECTION INTRAMUSCULAR; INTRAVENOUS; SUBCUTANEOUS at 13:39

## 2019-01-01 RX ADMIN — PREGABALIN 1000 MICROGRAM(S): 225 CAPSULE ORAL at 13:54

## 2019-01-01 RX ADMIN — Medication 300 MILLIGRAM(S): at 21:18

## 2019-01-01 RX ADMIN — MORPHINE SULFATE 2 MILLIGRAM(S): 50 CAPSULE, EXTENDED RELEASE ORAL at 18:23

## 2019-01-01 RX ADMIN — PANTOPRAZOLE SODIUM 40 MILLIGRAM(S): 20 TABLET, DELAYED RELEASE ORAL at 06:18

## 2019-01-01 RX ADMIN — GABAPENTIN 300 MILLIGRAM(S): 400 CAPSULE ORAL at 21:23

## 2019-01-01 RX ADMIN — Medication 50 MILLIGRAM(S): at 18:59

## 2019-01-01 RX ADMIN — OXYCODONE AND ACETAMINOPHEN 2 TABLET(S): 5; 325 TABLET ORAL at 22:19

## 2019-01-01 RX ADMIN — Medication 3 MILLIGRAM(S): at 19:13

## 2019-01-01 RX ADMIN — Medication 40 MILLIGRAM(S): at 05:40

## 2019-01-01 RX ADMIN — SODIUM CHLORIDE 50 MILLILITER(S): 9 INJECTION, SOLUTION INTRAVENOUS at 09:25

## 2019-01-01 RX ADMIN — GABAPENTIN 300 MILLIGRAM(S): 400 CAPSULE ORAL at 06:29

## 2019-01-01 RX ADMIN — Medication 50 MILLIGRAM(S): at 06:43

## 2019-01-01 RX ADMIN — Medication 40 MILLIGRAM(S): at 05:28

## 2019-01-01 RX ADMIN — MORPHINE SULFATE 2 MILLIGRAM(S): 50 CAPSULE, EXTENDED RELEASE ORAL at 09:23

## 2019-01-01 RX ADMIN — CHLORHEXIDINE GLUCONATE 1 APPLICATION(S): 213 SOLUTION TOPICAL at 06:10

## 2019-01-01 RX ADMIN — Medication 100 MILLIGRAM(S): at 14:54

## 2019-01-01 RX ADMIN — Medication 50 MILLIGRAM(S): at 06:52

## 2019-01-01 RX ADMIN — OXYCODONE AND ACETAMINOPHEN 2 TABLET(S): 5; 325 TABLET ORAL at 16:03

## 2019-01-01 RX ADMIN — MORPHINE SULFATE 2 MILLIGRAM(S): 50 CAPSULE, EXTENDED RELEASE ORAL at 03:09

## 2019-01-01 RX ADMIN — Medication 100 MILLIGRAM(S): at 22:47

## 2019-01-01 RX ADMIN — Medication 20 MILLIGRAM(S): at 05:59

## 2019-01-01 RX ADMIN — GABAPENTIN 300 MILLIGRAM(S): 400 CAPSULE ORAL at 23:31

## 2019-01-01 RX ADMIN — Medication 100 MILLIGRAM(S): at 13:40

## 2019-01-01 RX ADMIN — TACROLIMUS 0.5 MILLIGRAM(S): 5 CAPSULE ORAL at 06:37

## 2019-01-01 RX ADMIN — Medication 100 MILLIGRAM(S): at 17:43

## 2019-01-01 RX ADMIN — GABAPENTIN 300 MILLIGRAM(S): 400 CAPSULE ORAL at 21:52

## 2019-01-01 RX ADMIN — AMLODIPINE BESYLATE 5 MILLIGRAM(S): 2.5 TABLET ORAL at 06:53

## 2019-01-01 RX ADMIN — MONTELUKAST 10 MILLIGRAM(S): 4 TABLET, CHEWABLE ORAL at 22:56

## 2019-01-01 RX ADMIN — AMLODIPINE BESYLATE 5 MILLIGRAM(S): 2.5 TABLET ORAL at 06:15

## 2019-01-01 RX ADMIN — MORPHINE SULFATE 2 MILLIGRAM(S): 50 CAPSULE, EXTENDED RELEASE ORAL at 22:08

## 2019-01-01 RX ADMIN — SODIUM CHLORIDE 3 MILLILITER(S): 9 INJECTION INTRAMUSCULAR; INTRAVENOUS; SUBCUTANEOUS at 23:31

## 2019-01-01 RX ADMIN — Medication 10 MILLIGRAM(S): at 05:07

## 2019-01-01 RX ADMIN — AMPICILLIN SODIUM AND SULBACTAM SODIUM 200 GRAM(S): 250; 125 INJECTION, POWDER, FOR SUSPENSION INTRAMUSCULAR; INTRAVENOUS at 19:13

## 2019-01-01 RX ADMIN — Medication 50 MILLIGRAM(S): at 17:29

## 2019-01-01 RX ADMIN — Medication 20 MILLIGRAM(S): at 06:00

## 2019-01-01 RX ADMIN — ONDANSETRON 4 MILLIGRAM(S): 8 TABLET, FILM COATED ORAL at 09:48

## 2019-01-01 RX ADMIN — MUPIROCIN 1 APPLICATION(S): 20 OINTMENT TOPICAL at 17:27

## 2019-01-01 RX ADMIN — MORPHINE SULFATE 2 MILLIGRAM(S): 50 CAPSULE, EXTENDED RELEASE ORAL at 01:46

## 2019-01-01 RX ADMIN — MONTELUKAST 10 MILLIGRAM(S): 4 TABLET, CHEWABLE ORAL at 21:52

## 2019-01-01 RX ADMIN — SODIUM CHLORIDE 3 MILLILITER(S): 9 INJECTION INTRAMUSCULAR; INTRAVENOUS; SUBCUTANEOUS at 21:49

## 2019-01-01 RX ADMIN — Medication 10 MILLIGRAM(S): at 05:34

## 2019-01-01 RX ADMIN — SENNA PLUS 2 TABLET(S): 8.6 TABLET ORAL at 21:17

## 2019-01-01 RX ADMIN — Medication 10 MILLIGRAM(S): at 05:55

## 2019-01-01 RX ADMIN — GABAPENTIN 300 MILLIGRAM(S): 400 CAPSULE ORAL at 06:16

## 2019-01-01 RX ADMIN — TACROLIMUS 0.5 MILLIGRAM(S): 5 CAPSULE ORAL at 19:39

## 2019-01-01 RX ADMIN — Medication 500 MILLIGRAM(S): at 12:04

## 2019-01-01 RX ADMIN — GABAPENTIN 300 MILLIGRAM(S): 400 CAPSULE ORAL at 21:44

## 2019-01-01 RX ADMIN — MEPERIDINE HYDROCHLORIDE 12.5 MILLIGRAM(S): 50 INJECTION INTRAMUSCULAR; INTRAVENOUS; SUBCUTANEOUS at 09:40

## 2019-01-01 RX ADMIN — OXYCODONE AND ACETAMINOPHEN 2 TABLET(S): 5; 325 TABLET ORAL at 09:15

## 2019-01-01 RX ADMIN — MORPHINE SULFATE 2 MILLIGRAM(S): 50 CAPSULE, EXTENDED RELEASE ORAL at 09:45

## 2019-01-01 RX ADMIN — MONTELUKAST 10 MILLIGRAM(S): 4 TABLET, CHEWABLE ORAL at 21:35

## 2019-01-01 RX ADMIN — Medication 40 MILLIGRAM(S): at 15:31

## 2019-01-01 RX ADMIN — Medication 100 MILLIGRAM(S): at 05:55

## 2019-01-01 RX ADMIN — Medication 20 MILLIGRAM(S): at 06:16

## 2019-01-01 RX ADMIN — MONTELUKAST 10 MILLIGRAM(S): 4 TABLET, CHEWABLE ORAL at 21:49

## 2019-01-01 RX ADMIN — MORPHINE SULFATE 2 MILLIGRAM(S): 50 CAPSULE, EXTENDED RELEASE ORAL at 06:08

## 2019-01-01 RX ADMIN — PANTOPRAZOLE SODIUM 40 MILLIGRAM(S): 20 TABLET, DELAYED RELEASE ORAL at 06:02

## 2019-01-01 RX ADMIN — SENNA PLUS 2 TABLET(S): 8.6 TABLET ORAL at 21:35

## 2019-01-01 RX ADMIN — MORPHINE SULFATE 2 MILLIGRAM(S): 50 CAPSULE, EXTENDED RELEASE ORAL at 22:15

## 2019-01-01 RX ADMIN — MORPHINE SULFATE 2 MILLIGRAM(S): 50 CAPSULE, EXTENDED RELEASE ORAL at 01:25

## 2019-01-01 RX ADMIN — TACROLIMUS 0.5 MILLIGRAM(S): 5 CAPSULE ORAL at 05:40

## 2019-01-01 RX ADMIN — MONTELUKAST 10 MILLIGRAM(S): 4 TABLET, CHEWABLE ORAL at 23:31

## 2019-01-01 RX ADMIN — Medication 325 MILLIGRAM(S): at 12:29

## 2019-01-01 RX ADMIN — Medication 100 MILLIGRAM(S): at 17:26

## 2019-01-01 RX ADMIN — PREGABALIN 1000 MICROGRAM(S): 225 CAPSULE ORAL at 12:43

## 2019-01-01 RX ADMIN — GABAPENTIN 300 MILLIGRAM(S): 400 CAPSULE ORAL at 15:30

## 2019-01-01 RX ADMIN — Medication 10 MILLIGRAM(S): at 05:27

## 2019-01-01 RX ADMIN — Medication 40 MILLIGRAM(S): at 05:26

## 2019-01-01 RX ADMIN — Medication 10 MILLIGRAM(S): at 05:26

## 2019-01-01 RX ADMIN — Medication 20 MILLIGRAM(S): at 06:37

## 2019-01-01 RX ADMIN — SODIUM CHLORIDE 3 MILLILITER(S): 9 INJECTION INTRAMUSCULAR; INTRAVENOUS; SUBCUTANEOUS at 06:13

## 2019-01-01 RX ADMIN — GABAPENTIN 300 MILLIGRAM(S): 400 CAPSULE ORAL at 06:43

## 2019-01-01 RX ADMIN — TACROLIMUS 0.5 MILLIGRAM(S): 5 CAPSULE ORAL at 05:27

## 2019-01-01 RX ADMIN — Medication 100 MILLIGRAM(S): at 18:17

## 2019-01-01 RX ADMIN — Medication 100 MILLIGRAM(S): at 21:49

## 2019-01-01 RX ADMIN — SODIUM CHLORIDE 3 MILLILITER(S): 9 INJECTION INTRAMUSCULAR; INTRAVENOUS; SUBCUTANEOUS at 21:32

## 2019-01-01 RX ADMIN — Medication 325 MILLIGRAM(S): at 15:04

## 2019-01-01 RX ADMIN — CHLORHEXIDINE GLUCONATE 1 APPLICATION(S): 213 SOLUTION TOPICAL at 10:29

## 2019-01-01 RX ADMIN — Medication 100 MILLIGRAM(S): at 05:07

## 2019-01-01 RX ADMIN — Medication 325 MILLIGRAM(S): at 12:48

## 2019-01-01 RX ADMIN — MORPHINE SULFATE 2 MILLIGRAM(S): 50 CAPSULE, EXTENDED RELEASE ORAL at 18:47

## 2019-01-01 RX ADMIN — WARFARIN SODIUM 1 MILLIGRAM(S): 2.5 TABLET ORAL at 21:42

## 2019-01-01 RX ADMIN — GABAPENTIN 300 MILLIGRAM(S): 400 CAPSULE ORAL at 05:46

## 2019-01-01 RX ADMIN — GABAPENTIN 300 MILLIGRAM(S): 400 CAPSULE ORAL at 06:55

## 2019-01-01 RX ADMIN — MORPHINE SULFATE 2 MILLIGRAM(S): 50 CAPSULE, EXTENDED RELEASE ORAL at 20:10

## 2019-01-01 RX ADMIN — MONTELUKAST 10 MILLIGRAM(S): 4 TABLET, CHEWABLE ORAL at 21:18

## 2019-01-01 RX ADMIN — Medication 650 MILLIGRAM(S): at 16:57

## 2019-01-01 RX ADMIN — TACROLIMUS 0.5 MILLIGRAM(S): 5 CAPSULE ORAL at 17:30

## 2019-01-01 RX ADMIN — WARFARIN SODIUM 2 MILLIGRAM(S): 2.5 TABLET ORAL at 21:03

## 2019-01-01 RX ADMIN — PREGABALIN 1000 MICROGRAM(S): 225 CAPSULE ORAL at 12:04

## 2019-01-01 RX ADMIN — Medication 50 MILLIGRAM(S): at 17:16

## 2019-01-01 RX ADMIN — Medication 50 MILLIGRAM(S): at 05:59

## 2019-01-01 RX ADMIN — SENNA PLUS 2 TABLET(S): 8.6 TABLET ORAL at 23:44

## 2019-01-01 RX ADMIN — SODIUM CHLORIDE 3 MILLILITER(S): 9 INJECTION INTRAMUSCULAR; INTRAVENOUS; SUBCUTANEOUS at 15:09

## 2019-01-01 RX ADMIN — Medication 100 MILLIGRAM(S): at 21:03

## 2019-01-01 RX ADMIN — OXYCODONE AND ACETAMINOPHEN 2 TABLET(S): 5; 325 TABLET ORAL at 07:09

## 2019-01-01 RX ADMIN — Medication 50 MILLIGRAM(S): at 05:07

## 2019-01-01 RX ADMIN — Medication 650 MILLIGRAM(S): at 17:10

## 2019-01-01 RX ADMIN — WARFARIN SODIUM 2 MILLIGRAM(S): 2.5 TABLET ORAL at 22:02

## 2019-01-01 RX ADMIN — OXYCODONE AND ACETAMINOPHEN 2 TABLET(S): 5; 325 TABLET ORAL at 06:41

## 2019-01-01 RX ADMIN — Medication 40 MILLIGRAM(S): at 05:45

## 2019-01-01 RX ADMIN — Medication 50 MILLIGRAM(S): at 18:08

## 2019-01-01 RX ADMIN — CEFEPIME 100 MILLIGRAM(S): 1 INJECTION, POWDER, FOR SOLUTION INTRAMUSCULAR; INTRAVENOUS at 05:51

## 2019-01-01 RX ADMIN — Medication 50 MILLIGRAM(S): at 17:02

## 2019-01-01 RX ADMIN — Medication 100 MILLIGRAM(S): at 21:23

## 2019-01-01 RX ADMIN — Medication 1000 MILLIGRAM(S): at 12:03

## 2019-01-01 RX ADMIN — GABAPENTIN 300 MILLIGRAM(S): 400 CAPSULE ORAL at 14:48

## 2019-01-01 RX ADMIN — SODIUM CHLORIDE 50 MILLILITER(S): 9 INJECTION INTRAMUSCULAR; INTRAVENOUS; SUBCUTANEOUS at 00:37

## 2019-01-01 RX ADMIN — GABAPENTIN 300 MILLIGRAM(S): 400 CAPSULE ORAL at 15:05

## 2019-01-01 RX ADMIN — SODIUM CHLORIDE 3 MILLILITER(S): 9 INJECTION INTRAMUSCULAR; INTRAVENOUS; SUBCUTANEOUS at 13:37

## 2019-01-01 RX ADMIN — CHLORHEXIDINE GLUCONATE 1 APPLICATION(S): 213 SOLUTION TOPICAL at 05:15

## 2019-01-01 RX ADMIN — Medication 40 MILLIGRAM(S): at 06:11

## 2019-01-01 RX ADMIN — Medication 650 MILLIGRAM(S): at 14:40

## 2019-01-01 RX ADMIN — OXYCODONE AND ACETAMINOPHEN 2 TABLET(S): 5; 325 TABLET ORAL at 19:59

## 2019-01-01 RX ADMIN — Medication 325 MILLIGRAM(S): at 12:06

## 2019-01-01 RX ADMIN — TACROLIMUS 0.5 MILLIGRAM(S): 5 CAPSULE ORAL at 06:11

## 2019-01-01 RX ADMIN — Medication 100 MILLIGRAM(S): at 15:31

## 2019-01-01 RX ADMIN — TACROLIMUS 0.5 MILLIGRAM(S): 5 CAPSULE ORAL at 06:44

## 2019-01-01 RX ADMIN — AMLODIPINE BESYLATE 5 MILLIGRAM(S): 2.5 TABLET ORAL at 06:09

## 2019-01-01 RX ADMIN — WARFARIN SODIUM 2 MILLIGRAM(S): 2.5 TABLET ORAL at 22:09

## 2019-01-01 RX ADMIN — TACROLIMUS 0.5 MILLIGRAM(S): 5 CAPSULE ORAL at 05:08

## 2019-01-01 RX ADMIN — AMLODIPINE BESYLATE 5 MILLIGRAM(S): 2.5 TABLET ORAL at 05:29

## 2019-01-01 RX ADMIN — CHLORHEXIDINE GLUCONATE 1 APPLICATION(S): 213 SOLUTION TOPICAL at 06:12

## 2019-01-01 RX ADMIN — Medication 50 MILLIGRAM(S): at 05:26

## 2019-01-01 RX ADMIN — SODIUM CHLORIDE 3 MILLILITER(S): 9 INJECTION INTRAMUSCULAR; INTRAVENOUS; SUBCUTANEOUS at 21:40

## 2019-01-01 RX ADMIN — WARFARIN SODIUM 2 MILLIGRAM(S): 2.5 TABLET ORAL at 22:18

## 2019-01-01 RX ADMIN — Medication 250 MILLIGRAM(S): at 16:32

## 2019-01-01 RX ADMIN — Medication 250 MILLIGRAM(S): at 05:17

## 2019-01-01 RX ADMIN — PREGABALIN 1000 MICROGRAM(S): 225 CAPSULE ORAL at 11:15

## 2019-01-01 RX ADMIN — Medication 50 MILLIGRAM(S): at 06:38

## 2019-01-01 RX ADMIN — SODIUM CHLORIDE 3 MILLILITER(S): 9 INJECTION INTRAMUSCULAR; INTRAVENOUS; SUBCUTANEOUS at 21:18

## 2019-01-01 RX ADMIN — GABAPENTIN 300 MILLIGRAM(S): 400 CAPSULE ORAL at 05:48

## 2019-01-01 RX ADMIN — PREGABALIN 1000 MICROGRAM(S): 225 CAPSULE ORAL at 12:03

## 2019-01-01 RX ADMIN — GABAPENTIN 300 MILLIGRAM(S): 400 CAPSULE ORAL at 23:22

## 2019-01-01 RX ADMIN — CHLORHEXIDINE GLUCONATE 1 APPLICATION(S): 213 SOLUTION TOPICAL at 05:24

## 2019-01-01 RX ADMIN — PREGABALIN 1000 MICROGRAM(S): 225 CAPSULE ORAL at 12:00

## 2019-01-01 RX ADMIN — AMLODIPINE BESYLATE 5 MILLIGRAM(S): 2.5 TABLET ORAL at 06:43

## 2019-01-01 RX ADMIN — Medication 325 MILLIGRAM(S): at 11:03

## 2019-01-01 RX ADMIN — Medication 40 MILLIGRAM(S): at 05:47

## 2019-01-01 RX ADMIN — Medication 40 MILLIGRAM(S): at 18:24

## 2019-01-01 RX ADMIN — MUPIROCIN 1 APPLICATION(S): 20 OINTMENT TOPICAL at 06:36

## 2019-01-01 RX ADMIN — OXYCODONE AND ACETAMINOPHEN 2 TABLET(S): 5; 325 TABLET ORAL at 18:05

## 2019-01-01 RX ADMIN — PANTOPRAZOLE SODIUM 40 MILLIGRAM(S): 20 TABLET, DELAYED RELEASE ORAL at 05:17

## 2019-01-01 RX ADMIN — MORPHINE SULFATE 2 MILLIGRAM(S): 50 CAPSULE, EXTENDED RELEASE ORAL at 20:36

## 2019-01-01 RX ADMIN — PREGABALIN 1000 MICROGRAM(S): 225 CAPSULE ORAL at 11:38

## 2019-01-01 RX ADMIN — Medication 10 MILLIGRAM(S): at 06:15

## 2019-01-01 RX ADMIN — Medication 50 MILLIGRAM(S): at 05:40

## 2019-01-01 RX ADMIN — OXYCODONE AND ACETAMINOPHEN 2 TABLET(S): 5; 325 TABLET ORAL at 09:46

## 2019-01-01 RX ADMIN — AMPICILLIN SODIUM AND SULBACTAM SODIUM 200 GRAM(S): 250; 125 INJECTION, POWDER, FOR SUSPENSION INTRAMUSCULAR; INTRAVENOUS at 17:14

## 2019-01-01 RX ADMIN — GABAPENTIN 300 MILLIGRAM(S): 400 CAPSULE ORAL at 12:38

## 2019-01-01 RX ADMIN — CEFEPIME 100 MILLIGRAM(S): 1 INJECTION, POWDER, FOR SOLUTION INTRAMUSCULAR; INTRAVENOUS at 06:00

## 2019-01-01 RX ADMIN — Medication 500 MILLIGRAM(S): at 11:33

## 2019-01-01 RX ADMIN — MONTELUKAST 10 MILLIGRAM(S): 4 TABLET, CHEWABLE ORAL at 23:22

## 2019-01-01 RX ADMIN — GABAPENTIN 300 MILLIGRAM(S): 400 CAPSULE ORAL at 22:17

## 2019-01-01 RX ADMIN — SODIUM CHLORIDE 100 MILLILITER(S): 9 INJECTION, SOLUTION INTRAVENOUS at 00:00

## 2019-01-01 RX ADMIN — AMLODIPINE BESYLATE 5 MILLIGRAM(S): 2.5 TABLET ORAL at 05:07

## 2019-01-01 RX ADMIN — PANTOPRAZOLE SODIUM 40 MILLIGRAM(S): 20 TABLET, DELAYED RELEASE ORAL at 08:05

## 2019-01-01 RX ADMIN — SENNA PLUS 2 TABLET(S): 8.6 TABLET ORAL at 21:03

## 2019-01-01 RX ADMIN — GABAPENTIN 300 MILLIGRAM(S): 400 CAPSULE ORAL at 06:11

## 2019-01-01 RX ADMIN — OXYCODONE AND ACETAMINOPHEN 2 TABLET(S): 5; 325 TABLET ORAL at 13:57

## 2019-01-01 RX ADMIN — MORPHINE SULFATE 4 MILLIGRAM(S): 50 CAPSULE, EXTENDED RELEASE ORAL at 11:49

## 2019-01-01 RX ADMIN — PREGABALIN 1000 MICROGRAM(S): 225 CAPSULE ORAL at 12:36

## 2019-01-01 RX ADMIN — MORPHINE SULFATE 4 MILLIGRAM(S): 50 CAPSULE, EXTENDED RELEASE ORAL at 13:07

## 2019-01-01 RX ADMIN — GABAPENTIN 300 MILLIGRAM(S): 400 CAPSULE ORAL at 05:55

## 2019-01-01 RX ADMIN — Medication 325 MILLIGRAM(S): at 16:52

## 2019-01-01 RX ADMIN — AMLODIPINE BESYLATE 5 MILLIGRAM(S): 2.5 TABLET ORAL at 05:08

## 2019-01-01 RX ADMIN — Medication 100 MILLIGRAM(S): at 22:00

## 2019-01-01 RX ADMIN — Medication 250 MILLIGRAM(S): at 17:27

## 2019-01-01 RX ADMIN — TACROLIMUS 0.5 MILLIGRAM(S): 5 CAPSULE ORAL at 06:18

## 2019-01-01 RX ADMIN — GABAPENTIN 300 MILLIGRAM(S): 400 CAPSULE ORAL at 13:50

## 2019-01-01 RX ADMIN — CHLORHEXIDINE GLUCONATE 1 APPLICATION(S): 213 SOLUTION TOPICAL at 05:45

## 2019-01-01 RX ADMIN — Medication 325 MILLIGRAM(S): at 11:33

## 2019-01-01 RX ADMIN — OXYCODONE AND ACETAMINOPHEN 2 TABLET(S): 5; 325 TABLET ORAL at 20:29

## 2019-01-01 RX ADMIN — PANTOPRAZOLE SODIUM 40 MILLIGRAM(S): 20 TABLET, DELAYED RELEASE ORAL at 06:12

## 2019-01-01 RX ADMIN — OXYCODONE AND ACETAMINOPHEN 2 TABLET(S): 5; 325 TABLET ORAL at 10:30

## 2019-01-01 RX ADMIN — WARFARIN SODIUM 2 MILLIGRAM(S): 2.5 TABLET ORAL at 22:00

## 2019-01-01 RX ADMIN — Medication 250 MILLIGRAM(S): at 05:10

## 2019-01-01 RX ADMIN — Medication 100 MILLIGRAM(S): at 05:59

## 2019-01-01 RX ADMIN — GABAPENTIN 300 MILLIGRAM(S): 400 CAPSULE ORAL at 14:12

## 2019-01-01 RX ADMIN — Medication 100 MILLIGRAM(S): at 21:58

## 2019-01-01 RX ADMIN — SODIUM CHLORIDE 3 MILLILITER(S): 9 INJECTION INTRAMUSCULAR; INTRAVENOUS; SUBCUTANEOUS at 14:21

## 2019-01-01 RX ADMIN — SODIUM CHLORIDE 3 MILLILITER(S): 9 INJECTION INTRAMUSCULAR; INTRAVENOUS; SUBCUTANEOUS at 14:55

## 2019-01-01 RX ADMIN — SENNA PLUS 2 TABLET(S): 8.6 TABLET ORAL at 22:02

## 2019-01-01 RX ADMIN — ONDANSETRON 4 MILLIGRAM(S): 8 TABLET, FILM COATED ORAL at 06:46

## 2019-01-01 RX ADMIN — SODIUM CHLORIDE 100 MILLILITER(S): 9 INJECTION, SOLUTION INTRAVENOUS at 16:32

## 2019-01-01 RX ADMIN — HYDROMORPHONE HYDROCHLORIDE 0.5 MILLIGRAM(S): 2 INJECTION INTRAMUSCULAR; INTRAVENOUS; SUBCUTANEOUS at 10:20

## 2019-01-01 RX ADMIN — TACROLIMUS 0.5 MILLIGRAM(S): 5 CAPSULE ORAL at 23:32

## 2019-01-01 RX ADMIN — AMLODIPINE BESYLATE 5 MILLIGRAM(S): 2.5 TABLET ORAL at 06:29

## 2019-01-01 RX ADMIN — MORPHINE SULFATE 2 MILLIGRAM(S): 50 CAPSULE, EXTENDED RELEASE ORAL at 09:55

## 2019-01-01 RX ADMIN — WARFARIN SODIUM 3 MILLIGRAM(S): 2.5 TABLET ORAL at 22:09

## 2019-01-01 RX ADMIN — Medication 500 MILLIGRAM(S): at 23:04

## 2019-01-01 RX ADMIN — OXYCODONE AND ACETAMINOPHEN 2 TABLET(S): 5; 325 TABLET ORAL at 17:00

## 2019-01-01 RX ADMIN — Medication 3 MILLIGRAM(S): at 06:18

## 2019-01-01 RX ADMIN — MORPHINE SULFATE 2 MILLIGRAM(S): 50 CAPSULE, EXTENDED RELEASE ORAL at 04:30

## 2019-01-01 RX ADMIN — CHLORHEXIDINE GLUCONATE 1 APPLICATION(S): 213 SOLUTION TOPICAL at 12:05

## 2019-01-01 RX ADMIN — Medication 10 MILLIGRAM(S): at 05:46

## 2019-01-01 RX ADMIN — SODIUM CHLORIDE 3 MILLILITER(S): 9 INJECTION INTRAMUSCULAR; INTRAVENOUS; SUBCUTANEOUS at 22:01

## 2019-01-01 RX ADMIN — MORPHINE SULFATE 2 MILLIGRAM(S): 50 CAPSULE, EXTENDED RELEASE ORAL at 04:15

## 2019-01-01 RX ADMIN — Medication 20 MILLIGRAM(S): at 06:15

## 2019-01-01 RX ADMIN — Medication 100 MILLIGRAM(S): at 21:34

## 2019-01-01 RX ADMIN — WARFARIN SODIUM 2 MILLIGRAM(S): 2.5 TABLET ORAL at 22:55

## 2019-01-01 RX ADMIN — MORPHINE SULFATE 2 MILLIGRAM(S): 50 CAPSULE, EXTENDED RELEASE ORAL at 13:34

## 2019-01-01 RX ADMIN — GABAPENTIN 300 MILLIGRAM(S): 400 CAPSULE ORAL at 13:46

## 2019-01-01 RX ADMIN — Medication 100 MILLIGRAM(S): at 21:36

## 2019-01-01 RX ADMIN — MONTELUKAST 10 MILLIGRAM(S): 4 TABLET, CHEWABLE ORAL at 21:39

## 2019-01-01 RX ADMIN — Medication 325 MILLIGRAM(S): at 12:03

## 2019-01-01 RX ADMIN — Medication 100 MILLIGRAM(S): at 14:09

## 2019-01-01 RX ADMIN — OXYCODONE AND ACETAMINOPHEN 1 TABLET(S): 5; 325 TABLET ORAL at 05:33

## 2019-01-01 RX ADMIN — PREGABALIN 1000 MICROGRAM(S): 225 CAPSULE ORAL at 15:05

## 2019-01-01 RX ADMIN — SODIUM CHLORIDE 3 MILLILITER(S): 9 INJECTION INTRAMUSCULAR; INTRAVENOUS; SUBCUTANEOUS at 06:29

## 2019-01-01 RX ADMIN — SENNA PLUS 2 TABLET(S): 8.6 TABLET ORAL at 22:01

## 2019-01-01 RX ADMIN — Medication 250 MILLIGRAM(S): at 17:35

## 2019-01-01 RX ADMIN — ENOXAPARIN SODIUM 90 MILLIGRAM(S): 100 INJECTION SUBCUTANEOUS at 17:31

## 2019-01-01 RX ADMIN — Medication 500 MILLIGRAM(S): at 12:43

## 2019-01-01 RX ADMIN — PREGABALIN 1000 MICROGRAM(S): 225 CAPSULE ORAL at 12:06

## 2019-01-01 RX ADMIN — HYDROMORPHONE HYDROCHLORIDE 0.5 MILLIGRAM(S): 2 INJECTION INTRAMUSCULAR; INTRAVENOUS; SUBCUTANEOUS at 10:05

## 2019-01-01 RX ADMIN — OXYCODONE AND ACETAMINOPHEN 2 TABLET(S): 5; 325 TABLET ORAL at 21:49

## 2019-01-01 RX ADMIN — Medication 500 MILLIGRAM(S): at 12:05

## 2019-01-01 RX ADMIN — MORPHINE SULFATE 2 MILLIGRAM(S): 50 CAPSULE, EXTENDED RELEASE ORAL at 12:09

## 2019-01-01 RX ADMIN — GABAPENTIN 300 MILLIGRAM(S): 400 CAPSULE ORAL at 21:03

## 2019-01-01 RX ADMIN — Medication 325 MILLIGRAM(S): at 13:40

## 2019-01-01 RX ADMIN — MORPHINE SULFATE 2 MILLIGRAM(S): 50 CAPSULE, EXTENDED RELEASE ORAL at 20:22

## 2019-01-01 RX ADMIN — ENOXAPARIN SODIUM 90 MILLIGRAM(S): 100 INJECTION SUBCUTANEOUS at 23:33

## 2019-01-01 RX ADMIN — CEFEPIME 100 MILLIGRAM(S): 1 INJECTION, POWDER, FOR SOLUTION INTRAMUSCULAR; INTRAVENOUS at 23:40

## 2019-01-01 RX ADMIN — Medication 650 MILLIGRAM(S): at 18:14

## 2019-01-01 RX ADMIN — Medication 50 MILLIGRAM(S): at 17:28

## 2019-01-01 RX ADMIN — Medication 100 MILLIGRAM(S): at 06:36

## 2019-01-01 RX ADMIN — MORPHINE SULFATE 2 MILLIGRAM(S): 50 CAPSULE, EXTENDED RELEASE ORAL at 18:24

## 2019-01-01 RX ADMIN — Medication 10 MILLIGRAM(S): at 05:48

## 2019-01-01 RX ADMIN — GABAPENTIN 300 MILLIGRAM(S): 400 CAPSULE ORAL at 05:40

## 2019-01-01 RX ADMIN — MEPERIDINE HYDROCHLORIDE 12.5 MILLIGRAM(S): 50 INJECTION INTRAMUSCULAR; INTRAVENOUS; SUBCUTANEOUS at 09:55

## 2019-01-01 RX ADMIN — MUPIROCIN 1 APPLICATION(S): 20 OINTMENT TOPICAL at 09:30

## 2019-01-01 RX ADMIN — Medication 10 MILLIGRAM(S): at 05:08

## 2019-01-01 RX ADMIN — AMLODIPINE BESYLATE 5 MILLIGRAM(S): 2.5 TABLET ORAL at 05:24

## 2019-01-01 RX ADMIN — OXYCODONE AND ACETAMINOPHEN 1 TABLET(S): 5; 325 TABLET ORAL at 15:07

## 2019-01-01 RX ADMIN — PANTOPRAZOLE SODIUM 40 MILLIGRAM(S): 20 TABLET, DELAYED RELEASE ORAL at 05:46

## 2019-01-01 RX ADMIN — TACROLIMUS 0.5 MILLIGRAM(S): 5 CAPSULE ORAL at 19:03

## 2019-01-01 RX ADMIN — Medication 50 MILLIGRAM(S): at 17:17

## 2019-01-01 RX ADMIN — GABAPENTIN 300 MILLIGRAM(S): 400 CAPSULE ORAL at 06:37

## 2019-01-01 RX ADMIN — TACROLIMUS 0.5 MILLIGRAM(S): 5 CAPSULE ORAL at 05:46

## 2019-01-01 RX ADMIN — Medication 50 GRAM(S): at 09:48

## 2019-01-01 RX ADMIN — TACROLIMUS 0.5 MILLIGRAM(S): 5 CAPSULE ORAL at 18:59

## 2019-01-01 RX ADMIN — MORPHINE SULFATE 2 MILLIGRAM(S): 50 CAPSULE, EXTENDED RELEASE ORAL at 20:13

## 2019-01-01 RX ADMIN — Medication 50 MILLIGRAM(S): at 05:18

## 2019-01-01 RX ADMIN — AMLODIPINE BESYLATE 5 MILLIGRAM(S): 2.5 TABLET ORAL at 06:00

## 2019-01-01 RX ADMIN — OXYCODONE AND ACETAMINOPHEN 2 TABLET(S): 5; 325 TABLET ORAL at 10:12

## 2019-01-01 RX ADMIN — Medication 50 MILLIGRAM(S): at 06:16

## 2019-01-01 RX ADMIN — MORPHINE SULFATE 4 MILLIGRAM(S): 50 CAPSULE, EXTENDED RELEASE ORAL at 12:00

## 2019-01-01 RX ADMIN — Medication 300 MILLIGRAM(S): at 06:37

## 2019-01-01 RX ADMIN — Medication 20 MILLIGRAM(S): at 05:35

## 2019-01-01 RX ADMIN — WARFARIN SODIUM 2 MILLIGRAM(S): 2.5 TABLET ORAL at 23:45

## 2019-01-01 RX ADMIN — Medication 10 MILLIGRAM(S): at 05:59

## 2019-01-01 RX ADMIN — Medication 250 MILLIGRAM(S): at 05:46

## 2019-01-01 RX ADMIN — MORPHINE SULFATE 2 MILLIGRAM(S): 50 CAPSULE, EXTENDED RELEASE ORAL at 02:25

## 2019-01-01 RX ADMIN — AMPICILLIN SODIUM AND SULBACTAM SODIUM 200 GRAM(S): 250; 125 INJECTION, POWDER, FOR SUSPENSION INTRAMUSCULAR; INTRAVENOUS at 05:33

## 2019-01-01 RX ADMIN — SODIUM CHLORIDE 3 MILLILITER(S): 9 INJECTION INTRAMUSCULAR; INTRAVENOUS; SUBCUTANEOUS at 06:08

## 2019-01-01 RX ADMIN — Medication 50 MILLIGRAM(S): at 05:46

## 2019-01-01 RX ADMIN — Medication 500 MILLIGRAM(S): at 11:38

## 2019-01-01 RX ADMIN — TACROLIMUS 0.5 MILLIGRAM(S): 5 CAPSULE ORAL at 17:57

## 2019-01-01 RX ADMIN — Medication 10 MILLIGRAM(S): at 05:17

## 2019-01-01 RX ADMIN — PANTOPRAZOLE SODIUM 40 MILLIGRAM(S): 20 TABLET, DELAYED RELEASE ORAL at 05:14

## 2019-01-01 RX ADMIN — OXYCODONE AND ACETAMINOPHEN 1 TABLET(S): 5; 325 TABLET ORAL at 08:35

## 2019-01-01 RX ADMIN — TACROLIMUS 0.5 MILLIGRAM(S): 5 CAPSULE ORAL at 17:28

## 2019-01-01 RX ADMIN — PREGABALIN 1000 MICROGRAM(S): 225 CAPSULE ORAL at 12:29

## 2019-01-01 RX ADMIN — AMPICILLIN SODIUM AND SULBACTAM SODIUM 200 GRAM(S): 250; 125 INJECTION, POWDER, FOR SUSPENSION INTRAMUSCULAR; INTRAVENOUS at 11:15

## 2019-01-01 RX ADMIN — Medication 650 MILLIGRAM(S): at 21:41

## 2019-01-01 RX ADMIN — CEFEPIME 100 MILLIGRAM(S): 1 INJECTION, POWDER, FOR SOLUTION INTRAMUSCULAR; INTRAVENOUS at 05:45

## 2019-01-01 RX ADMIN — SODIUM CHLORIDE 3 MILLILITER(S): 9 INJECTION INTRAMUSCULAR; INTRAVENOUS; SUBCUTANEOUS at 13:24

## 2019-01-01 RX ADMIN — Medication 250 MILLIGRAM(S): at 05:45

## 2019-01-01 RX ADMIN — MONTELUKAST 10 MILLIGRAM(S): 4 TABLET, CHEWABLE ORAL at 22:47

## 2019-01-01 RX ADMIN — Medication 10 MILLIGRAM(S): at 06:37

## 2019-01-01 RX ADMIN — Medication 50 MILLIGRAM(S): at 05:52

## 2019-01-01 RX ADMIN — Medication 500 MILLIGRAM(S): at 12:29

## 2019-01-01 RX ADMIN — AMLODIPINE BESYLATE 5 MILLIGRAM(S): 2.5 TABLET ORAL at 05:59

## 2019-01-01 RX ADMIN — CHLORHEXIDINE GLUCONATE 1 APPLICATION(S): 213 SOLUTION TOPICAL at 05:27

## 2019-01-01 RX ADMIN — MORPHINE SULFATE 2 MILLIGRAM(S): 50 CAPSULE, EXTENDED RELEASE ORAL at 17:49

## 2019-01-01 RX ADMIN — GABAPENTIN 300 MILLIGRAM(S): 400 CAPSULE ORAL at 13:51

## 2019-01-01 RX ADMIN — Medication 500 MILLIGRAM(S): at 11:08

## 2019-01-01 RX ADMIN — SODIUM CHLORIDE 100 MILLILITER(S): 9 INJECTION, SOLUTION INTRAVENOUS at 23:36

## 2019-01-01 RX ADMIN — Medication 10 MILLIGRAM(S): at 05:24

## 2019-01-01 RX ADMIN — PREGABALIN 1000 MICROGRAM(S): 225 CAPSULE ORAL at 11:08

## 2019-01-01 RX ADMIN — GABAPENTIN 300 MILLIGRAM(S): 400 CAPSULE ORAL at 14:55

## 2019-01-01 RX ADMIN — ONDANSETRON 4 MILLIGRAM(S): 8 TABLET, FILM COATED ORAL at 08:46

## 2019-01-01 RX ADMIN — WARFARIN SODIUM 2 MILLIGRAM(S): 2.5 TABLET ORAL at 21:52

## 2019-01-01 RX ADMIN — PANTOPRAZOLE SODIUM 40 MILLIGRAM(S): 20 TABLET, DELAYED RELEASE ORAL at 08:11

## 2019-01-01 RX ADMIN — Medication 325 MILLIGRAM(S): at 12:05

## 2019-01-01 RX ADMIN — PREGABALIN 1000 MICROGRAM(S): 225 CAPSULE ORAL at 15:00

## 2019-01-01 RX ADMIN — OXYCODONE AND ACETAMINOPHEN 2 TABLET(S): 5; 325 TABLET ORAL at 07:42

## 2019-01-01 RX ADMIN — PANTOPRAZOLE SODIUM 40 MILLIGRAM(S): 20 TABLET, DELAYED RELEASE ORAL at 06:52

## 2019-01-01 RX ADMIN — MUPIROCIN 1 APPLICATION(S): 20 OINTMENT TOPICAL at 18:47

## 2019-01-01 RX ADMIN — PREGABALIN 1000 MICROGRAM(S): 225 CAPSULE ORAL at 12:05

## 2019-01-01 RX ADMIN — GABAPENTIN 300 MILLIGRAM(S): 400 CAPSULE ORAL at 05:08

## 2019-01-01 RX ADMIN — Medication 1 TABLET(S): at 07:02

## 2019-01-01 RX ADMIN — Medication 10 MILLIGRAM(S): at 06:57

## 2019-01-01 RX ADMIN — GABAPENTIN 300 MILLIGRAM(S): 400 CAPSULE ORAL at 22:02

## 2019-01-01 RX ADMIN — TACROLIMUS 0.5 MILLIGRAM(S): 5 CAPSULE ORAL at 06:15

## 2019-01-01 RX ADMIN — SODIUM CHLORIDE 3 MILLILITER(S): 9 INJECTION INTRAMUSCULAR; INTRAVENOUS; SUBCUTANEOUS at 21:27

## 2019-01-01 RX ADMIN — Medication 1 APPLICATION(S): at 17:52

## 2019-01-01 RX ADMIN — ONDANSETRON 4 MILLIGRAM(S): 8 TABLET, FILM COATED ORAL at 09:16

## 2019-01-01 RX ADMIN — Medication 50 MILLIGRAM(S): at 17:43

## 2019-01-01 RX ADMIN — GABAPENTIN 300 MILLIGRAM(S): 400 CAPSULE ORAL at 22:00

## 2019-01-01 RX ADMIN — MONTELUKAST 10 MILLIGRAM(S): 4 TABLET, CHEWABLE ORAL at 21:03

## 2019-01-01 RX ADMIN — Medication 650 MILLIGRAM(S): at 14:53

## 2019-01-01 RX ADMIN — Medication 100 MILLIGRAM(S): at 05:53

## 2019-01-01 RX ADMIN — Medication 250 MILLIGRAM(S): at 17:00

## 2019-01-01 RX ADMIN — TACROLIMUS 0.5 MILLIGRAM(S): 5 CAPSULE ORAL at 20:03

## 2019-01-01 RX ADMIN — TACROLIMUS 0.5 MILLIGRAM(S): 5 CAPSULE ORAL at 21:34

## 2019-01-01 RX ADMIN — TACROLIMUS 0.5 MILLIGRAM(S): 5 CAPSULE ORAL at 05:51

## 2019-01-01 RX ADMIN — Medication 40 MILLIGRAM(S): at 05:15

## 2019-01-01 RX ADMIN — Medication 50 MILLIGRAM(S): at 06:29

## 2019-01-01 RX ADMIN — MORPHINE SULFATE 2 MILLIGRAM(S): 50 CAPSULE, EXTENDED RELEASE ORAL at 10:50

## 2019-01-01 RX ADMIN — MORPHINE SULFATE 2 MILLIGRAM(S): 50 CAPSULE, EXTENDED RELEASE ORAL at 00:25

## 2019-01-01 RX ADMIN — Medication 40 MILLIGRAM(S): at 06:54

## 2019-01-01 RX ADMIN — Medication 1 APPLICATION(S): at 22:01

## 2019-01-01 RX ADMIN — GABAPENTIN 300 MILLIGRAM(S): 400 CAPSULE ORAL at 14:13

## 2019-01-01 RX ADMIN — Medication 10 MILLIGRAM(S): at 05:14

## 2019-01-01 RX ADMIN — MONTELUKAST 10 MILLIGRAM(S): 4 TABLET, CHEWABLE ORAL at 21:17

## 2019-01-01 RX ADMIN — Medication 325 MILLIGRAM(S): at 12:43

## 2019-01-01 RX ADMIN — MORPHINE SULFATE 2 MILLIGRAM(S): 50 CAPSULE, EXTENDED RELEASE ORAL at 21:47

## 2019-01-01 RX ADMIN — Medication 50 MILLIGRAM(S): at 18:17

## 2019-01-01 RX ADMIN — MORPHINE SULFATE 2 MILLIGRAM(S): 50 CAPSULE, EXTENDED RELEASE ORAL at 03:15

## 2019-01-01 RX ADMIN — Medication 1 APPLICATION(S): at 09:40

## 2019-01-01 RX ADMIN — TACROLIMUS 0.5 MILLIGRAM(S): 5 CAPSULE ORAL at 06:53

## 2019-01-01 RX ADMIN — MORPHINE SULFATE 2 MILLIGRAM(S): 50 CAPSULE, EXTENDED RELEASE ORAL at 05:47

## 2019-01-01 RX ADMIN — Medication 50 MILLIGRAM(S): at 18:45

## 2019-01-01 RX ADMIN — Medication 50 MILLIGRAM(S): at 17:26

## 2019-01-01 RX ADMIN — CEFEPIME 100 MILLIGRAM(S): 1 INJECTION, POWDER, FOR SOLUTION INTRAMUSCULAR; INTRAVENOUS at 05:17

## 2019-01-01 RX ADMIN — TACROLIMUS 0.5 MILLIGRAM(S): 5 CAPSULE ORAL at 18:00

## 2019-01-01 RX ADMIN — GABAPENTIN 300 MILLIGRAM(S): 400 CAPSULE ORAL at 14:21

## 2019-01-01 RX ADMIN — GABAPENTIN 300 MILLIGRAM(S): 400 CAPSULE ORAL at 05:34

## 2019-01-01 RX ADMIN — AMLODIPINE BESYLATE 5 MILLIGRAM(S): 2.5 TABLET ORAL at 05:26

## 2019-01-01 RX ADMIN — TACROLIMUS 0.5 MILLIGRAM(S): 5 CAPSULE ORAL at 17:18

## 2019-01-01 RX ADMIN — SODIUM CHLORIDE 100 MILLILITER(S): 9 INJECTION, SOLUTION INTRAVENOUS at 05:51

## 2019-01-02 ENCOUNTER — INPATIENT (INPATIENT)
Facility: HOSPITAL | Age: 74
LOS: 4 days | Discharge: HOME | End: 2019-01-07
Attending: PLASTIC SURGERY | Admitting: PLASTIC SURGERY
Payer: MEDICARE

## 2019-01-02 VITALS
TEMPERATURE: 96 F | OXYGEN SATURATION: 97 % | RESPIRATION RATE: 20 BRPM | HEART RATE: 85 BPM | DIASTOLIC BLOOD PRESSURE: 91 MMHG | SYSTOLIC BLOOD PRESSURE: 142 MMHG

## 2019-01-02 DIAGNOSIS — Z98.890 OTHER SPECIFIED POSTPROCEDURAL STATES: Chronic | ICD-10-CM

## 2019-01-02 LAB
ALBUMIN SERPL ELPH-MCNC: 3.2 G/DL — LOW (ref 3.5–5.2)
ALP SERPL-CCNC: 157 U/L — HIGH (ref 30–115)
ALT FLD-CCNC: 50 U/L — HIGH (ref 0–41)
ANION GAP SERPL CALC-SCNC: 17 MMOL/L — HIGH (ref 7–14)
APTT BLD: 30.9 SEC — SIGNIFICANT CHANGE UP (ref 27–39.2)
AST SERPL-CCNC: 32 U/L — SIGNIFICANT CHANGE UP (ref 0–41)
BASOPHILS # BLD AUTO: 0.03 K/UL — SIGNIFICANT CHANGE UP (ref 0–0.2)
BASOPHILS NFR BLD AUTO: 0.2 % — SIGNIFICANT CHANGE UP (ref 0–1)
BILIRUB SERPL-MCNC: 0.7 MG/DL — SIGNIFICANT CHANGE UP (ref 0.2–1.2)
BUN SERPL-MCNC: 18 MG/DL — SIGNIFICANT CHANGE UP (ref 10–20)
CALCIUM SERPL-MCNC: 8.6 MG/DL — SIGNIFICANT CHANGE UP (ref 8.5–10.1)
CHLORIDE SERPL-SCNC: 101 MMOL/L — SIGNIFICANT CHANGE UP (ref 98–110)
CO2 SERPL-SCNC: 26 MMOL/L — SIGNIFICANT CHANGE UP (ref 17–32)
CREAT SERPL-MCNC: 1.2 MG/DL — SIGNIFICANT CHANGE UP (ref 0.7–1.5)
EOSINOPHIL # BLD AUTO: 0.01 K/UL — SIGNIFICANT CHANGE UP (ref 0–0.7)
EOSINOPHIL NFR BLD AUTO: 0.1 % — SIGNIFICANT CHANGE UP (ref 0–8)
GLUCOSE SERPL-MCNC: 138 MG/DL — HIGH (ref 70–99)
HCT VFR BLD CALC: 39.7 % — SIGNIFICANT CHANGE UP (ref 37–47)
HGB BLD-MCNC: 12.6 G/DL — SIGNIFICANT CHANGE UP (ref 12–16)
IMM GRANULOCYTES NFR BLD AUTO: 1.3 % — HIGH (ref 0.1–0.3)
INR BLD: 2.31 RATIO — HIGH (ref 0.65–1.3)
LACTATE SERPL-SCNC: 1.2 MMOL/L — SIGNIFICANT CHANGE UP (ref 0.5–2.2)
LYMPHOCYTES # BLD AUTO: 1.51 K/UL — SIGNIFICANT CHANGE UP (ref 1.2–3.4)
LYMPHOCYTES # BLD AUTO: 9.3 % — LOW (ref 20.5–51.1)
MCHC RBC-ENTMCNC: 27.3 PG — SIGNIFICANT CHANGE UP (ref 27–31)
MCHC RBC-ENTMCNC: 31.7 G/DL — LOW (ref 32–37)
MCV RBC AUTO: 85.9 FL — SIGNIFICANT CHANGE UP (ref 81–99)
MONOCYTES # BLD AUTO: 0.84 K/UL — HIGH (ref 0.1–0.6)
MONOCYTES NFR BLD AUTO: 5.2 % — SIGNIFICANT CHANGE UP (ref 1.7–9.3)
NEUTROPHILS # BLD AUTO: 13.7 K/UL — HIGH (ref 1.4–6.5)
NEUTROPHILS NFR BLD AUTO: 83.9 % — HIGH (ref 42.2–75.2)
NRBC # BLD: 0 /100 WBCS — SIGNIFICANT CHANGE UP (ref 0–0)
PLATELET # BLD AUTO: 275 K/UL — SIGNIFICANT CHANGE UP (ref 130–400)
POTASSIUM SERPL-MCNC: 4.4 MMOL/L — SIGNIFICANT CHANGE UP (ref 3.5–5)
POTASSIUM SERPL-SCNC: 4.4 MMOL/L — SIGNIFICANT CHANGE UP (ref 3.5–5)
PROT SERPL-MCNC: 6.1 G/DL — SIGNIFICANT CHANGE UP (ref 6–8)
PROTHROM AB SERPL-ACNC: 26.4 SEC — HIGH (ref 9.95–12.87)
RBC # BLD: 4.62 M/UL — SIGNIFICANT CHANGE UP (ref 4.2–5.4)
RBC # FLD: 15.9 % — HIGH (ref 11.5–14.5)
SODIUM SERPL-SCNC: 144 MMOL/L — SIGNIFICANT CHANGE UP (ref 135–146)
WBC # BLD: 16.31 K/UL — HIGH (ref 4.8–10.8)
WBC # FLD AUTO: 16.31 K/UL — HIGH (ref 4.8–10.8)

## 2019-01-02 PROCEDURE — 93971 EXTREMITY STUDY: CPT | Mod: 26

## 2019-01-02 RX ORDER — METOPROLOL TARTRATE 50 MG
25 TABLET ORAL DAILY
Qty: 0 | Refills: 0 | Status: DISCONTINUED | OUTPATIENT
Start: 2019-01-02 | End: 2019-01-04

## 2019-01-02 RX ORDER — MORPHINE SULFATE 50 MG/1
2 CAPSULE, EXTENDED RELEASE ORAL EVERY 6 HOURS
Qty: 0 | Refills: 0 | Status: DISCONTINUED | OUTPATIENT
Start: 2019-01-02 | End: 2019-01-04

## 2019-01-02 RX ORDER — AMPICILLIN SODIUM AND SULBACTAM SODIUM 250; 125 MG/ML; MG/ML
3 INJECTION, POWDER, FOR SUSPENSION INTRAMUSCULAR; INTRAVENOUS EVERY 6 HOURS
Qty: 0 | Refills: 0 | Status: DISCONTINUED | OUTPATIENT
Start: 2019-01-02 | End: 2019-01-04

## 2019-01-02 RX ORDER — AMPICILLIN SODIUM AND SULBACTAM SODIUM 250; 125 MG/ML; MG/ML
3 INJECTION, POWDER, FOR SUSPENSION INTRAMUSCULAR; INTRAVENOUS ONCE
Qty: 0 | Refills: 0 | Status: COMPLETED | OUTPATIENT
Start: 2019-01-02 | End: 2019-01-02

## 2019-01-02 RX ORDER — BUDESONIDE, MICRONIZED 100 %
3 POWDER (GRAM) MISCELLANEOUS
Qty: 0 | Refills: 0 | Status: DISCONTINUED | OUTPATIENT
Start: 2019-01-02 | End: 2019-01-04

## 2019-01-02 RX ORDER — SODIUM CHLORIDE 9 MG/ML
1000 INJECTION, SOLUTION INTRAVENOUS ONCE
Qty: 0 | Refills: 0 | Status: COMPLETED | OUTPATIENT
Start: 2019-01-02 | End: 2019-01-02

## 2019-01-02 RX ORDER — TACROLIMUS 5 MG/1
0.5 CAPSULE ORAL AT BEDTIME
Qty: 0 | Refills: 0 | Status: DISCONTINUED | OUTPATIENT
Start: 2019-01-02 | End: 2019-01-04

## 2019-01-02 RX ORDER — AMLODIPINE BESYLATE 2.5 MG/1
5 TABLET ORAL DAILY
Qty: 0 | Refills: 0 | Status: DISCONTINUED | OUTPATIENT
Start: 2019-01-02 | End: 2019-01-04

## 2019-01-02 RX ORDER — TACROLIMUS 5 MG/1
1 CAPSULE ORAL DAILY
Qty: 0 | Refills: 0 | Status: DISCONTINUED | OUTPATIENT
Start: 2019-01-02 | End: 2019-01-04

## 2019-01-02 RX ORDER — ACETAMINOPHEN 500 MG
650 TABLET ORAL EVERY 6 HOURS
Qty: 0 | Refills: 0 | Status: DISCONTINUED | OUTPATIENT
Start: 2019-01-02 | End: 2019-01-04

## 2019-01-02 RX ORDER — SODIUM CHLORIDE 9 MG/ML
1000 INJECTION, SOLUTION INTRAVENOUS
Qty: 0 | Refills: 0 | Status: DISCONTINUED | OUTPATIENT
Start: 2019-01-02 | End: 2019-01-03

## 2019-01-02 RX ORDER — GABAPENTIN 400 MG/1
100 CAPSULE ORAL EVERY 8 HOURS
Qty: 0 | Refills: 0 | Status: DISCONTINUED | OUTPATIENT
Start: 2019-01-02 | End: 2019-01-04

## 2019-01-02 RX ORDER — CIPROFLOXACIN LACTATE 400MG/40ML
400 VIAL (ML) INTRAVENOUS EVERY 12 HOURS
Qty: 0 | Refills: 0 | Status: DISCONTINUED | OUTPATIENT
Start: 2019-01-02 | End: 2019-01-03

## 2019-01-02 RX ORDER — OXYCODONE AND ACETAMINOPHEN 5; 325 MG/1; MG/1
1 TABLET ORAL EVERY 6 HOURS
Qty: 0 | Refills: 0 | Status: DISCONTINUED | OUTPATIENT
Start: 2019-01-02 | End: 2019-01-04

## 2019-01-02 RX ORDER — SENNA PLUS 8.6 MG/1
2 TABLET ORAL AT BEDTIME
Qty: 0 | Refills: 0 | Status: DISCONTINUED | OUTPATIENT
Start: 2019-01-02 | End: 2019-01-04

## 2019-01-02 RX ORDER — FUROSEMIDE 40 MG
20 TABLET ORAL DAILY
Qty: 0 | Refills: 0 | Status: DISCONTINUED | OUTPATIENT
Start: 2019-01-02 | End: 2019-01-04

## 2019-01-02 RX ORDER — METOPROLOL TARTRATE 50 MG
25 TABLET ORAL DAILY
Qty: 0 | Refills: 0 | Status: DISCONTINUED | OUTPATIENT
Start: 2019-01-02 | End: 2019-01-02

## 2019-01-02 RX ORDER — MONTELUKAST 4 MG/1
10 TABLET, CHEWABLE ORAL AT BEDTIME
Qty: 0 | Refills: 0 | Status: DISCONTINUED | OUTPATIENT
Start: 2019-01-02 | End: 2019-01-04

## 2019-01-02 RX ORDER — DOCUSATE SODIUM 100 MG
100 CAPSULE ORAL THREE TIMES A DAY
Qty: 0 | Refills: 0 | Status: DISCONTINUED | OUTPATIENT
Start: 2019-01-02 | End: 2019-01-04

## 2019-01-02 RX ADMIN — AMLODIPINE BESYLATE 5 MILLIGRAM(S): 2.5 TABLET ORAL at 21:49

## 2019-01-02 RX ADMIN — TACROLIMUS 0.5 MILLIGRAM(S): 5 CAPSULE ORAL at 23:31

## 2019-01-02 RX ADMIN — SODIUM CHLORIDE 2000 MILLILITER(S): 9 INJECTION, SOLUTION INTRAVENOUS at 13:44

## 2019-01-02 RX ADMIN — Medication 25 MILLIGRAM(S): at 21:49

## 2019-01-02 RX ADMIN — Medication 200 MILLIGRAM(S): at 21:44

## 2019-01-02 RX ADMIN — SODIUM CHLORIDE 75 MILLILITER(S): 9 INJECTION, SOLUTION INTRAVENOUS at 23:34

## 2019-01-02 RX ADMIN — Medication 3 MILLIGRAM(S): at 23:31

## 2019-01-02 RX ADMIN — GABAPENTIN 100 MILLIGRAM(S): 400 CAPSULE ORAL at 21:47

## 2019-01-02 RX ADMIN — AMPICILLIN SODIUM AND SULBACTAM SODIUM 200 GRAM(S): 250; 125 INJECTION, POWDER, FOR SUSPENSION INTRAMUSCULAR; INTRAVENOUS at 21:39

## 2019-01-02 RX ADMIN — AMPICILLIN SODIUM AND SULBACTAM SODIUM 200 GRAM(S): 250; 125 INJECTION, POWDER, FOR SUSPENSION INTRAMUSCULAR; INTRAVENOUS at 13:44

## 2019-01-02 RX ADMIN — SENNA PLUS 2 TABLET(S): 8.6 TABLET ORAL at 21:45

## 2019-01-02 RX ADMIN — Medication 100 MILLIGRAM(S): at 21:47

## 2019-01-02 RX ADMIN — MONTELUKAST 10 MILLIGRAM(S): 4 TABLET, CHEWABLE ORAL at 21:46

## 2019-01-02 RX ADMIN — Medication 10 MILLIGRAM(S): at 21:49

## 2019-01-02 RX ADMIN — Medication 20 MILLIGRAM(S): at 21:49

## 2019-01-02 NOTE — ED PROVIDER NOTE - CARE PLAN
Principal Discharge DX:	Cellulitis  Secondary Diagnosis:	Leg ulcer  Secondary Diagnosis:	Leg swelling  Secondary Diagnosis:	Leg pain

## 2019-01-02 NOTE — ED PROVIDER NOTE - OBJECTIVE STATEMENT
74 yo female, pmh of autoimmune hepatitis on immunosuppressant therapy, htn, and dvt, presents to the ED for evaluation of left leg swelling and leg ulcer. Pt reports left leg developed anterior skin ulcer on left tibia that states has gotten worse; additionally reports left lower leg has become more swollen. Pt has done wound care to ulcer, has not taken otc medications, has not done supportive measures for pain and swelling. Pt reports movement makes pain worse and is unsure of what makes sxs better. Pt has had ulcer in past that have been followed by . At this time denies fever, chills, cp, sob, trauma, abd pain, nvd, ha, numbness, tingling, visual changes, rash.

## 2019-01-02 NOTE — ED ADULT NURSE NOTE - NSFALLRSKASSESSDT_ED_ALL_ED
Left msg with patient   Blood sugars look great on jardiance and Janumet   Continue current regimen, no change 
02-Jan-2019 12:39

## 2019-01-02 NOTE — H&P ADULT - NSHPPHYSICALEXAM_GEN_ALL_CORE
General: patient lying in bed comfortably, breathing comfortably on room air, in no acute distress  Left Leg: Small 2cm x 3xm necrotic open wound located anteriorly below knee, with surrounding erythema extending to entire leg below knee, significant non pitting edema from below knee to foot. Tender to palpation. Warm to touch. Patient able to move ankle and knee but in pain

## 2019-01-02 NOTE — ED PROVIDER NOTE - PROGRESS NOTE DETAILS
Prelim negative for dvt. Spoke with burn AILYN Hodges, requests admission to med/surg under Dr. Kang. WIll go to OR for debridement.

## 2019-01-02 NOTE — ED PROVIDER NOTE - PHYSICAL EXAMINATION
Physical Exam    Vital Signs: I have reviewed the initial vital signs.  Constitutional: well-nourished, appears stated age, no acute distress  Eyes: Conjunctiva pink, Sclera clear, PERRLA, EOMI.  Cardiovascular: S1 and S2, regular rate, regular rhythm, well-perfused extremities, radial pulses equal and 2+, pedal pulses are 1+ and equal b/l.  Respiratory: unlabored respiratory effort, clear to auscultation bilaterally no wheezing, rales and rhonchi  Gastrointestinal: soft, non-tender abdomen, no pulsatile mass, normal bowl sounds  Musculoskeletal: supple neck, left lower extremity 2+ pitting edema, ttp over left lateral ankle, rom of left ankle limited secondary to pain ,   Integumentary: left lower extremity edema 2+ edema, cellulitis, anterior ulcer on left tibia, minimal depth, surrounding necrosis of ulcer.   Neurologic: awake, alert, cranial nerves II-XII grossly intact, extremities’ motor and sensory functions grossly intact. NVI.

## 2019-01-02 NOTE — BRIEF OPERATIVE NOTE - PROCEDURE
<<-----Click on this checkbox to enter Procedure Debridement  01/02/2019  sharp excisional debridement left lower leg 3x2cm  Active  [email or web address]

## 2019-01-02 NOTE — ED PROVIDER NOTE - MEDICAL DECISION MAKING DETAILS
I personally evaluated the patient. I reviewed the Resident’s or Physician Assistant’s note (as assigned above), and agree with the findings and plan except as documented in my note. Patient here with cellulitis, on immune modulators.

## 2019-01-02 NOTE — ED PROVIDER NOTE - ATTENDING CONTRIBUTION TO CARE
74 yo female, pmh of autoimmune hepatitis on immunosuppressant therapy, htn, and dvt, patient here with right anterior shin cellulitis and early onset ulceration, patient denies fever, states that symptoms started a few days, no cp/sob, no n/v/d, no loc. + ambulatory    CONSTITUTIONAL: Well-developed; well-nourished; in no acute distress. Sitting up and providing appropriate history and physical examination  SKIN: see below. Remainder of skin exam is warm and dry, no acute rash.  HEAD: Normocephalic; atraumatic.  EYES: PERRL, 3 mm bilateral, no nystagmus, EOM intact; conjunctiva and sclera clear.  ENT: No nasal discharge; airway clear.  EXT: + RLE anterior shin cellulitis with erythema and early ulceration and mild purulent discharge, Normal ROM. No clubbing, cyanosis or edema. Dp and Pt Pulses intact. Cap refill less than 3 seconds  NEURO: Alert, oriented, grossly unremarkable. No Focal deficits. GCS 15. NIH 0    Patient seen and evaluated by maricel, will admit for IV antibiotics to burn service

## 2019-01-02 NOTE — H&P ADULT - HISTORY OF PRESENT ILLNESS
73y female with pmh of Asthma, Autoimmune hepatitis treated with steroids,DVT, HTN, PE and DVT (on coumadin) who presented to ED for LLE wound with worsening pain, swelling and redness. Patient states her left leg is also slightly more swollen than her right, but she noticed on Sunday there was a small blood blister on her shin below her knee. Since then LLE has become more swollen, redness increased from surrounding the wound and spread to her leg and pain has worsened. Patient reports chills, but no fever. Patient denies SOB, chest pain, dysuria, nausea or vomiting.

## 2019-01-02 NOTE — H&P ADULT - ASSESSMENT
73y female with pmh of Asthma, Autoimmune hepatitis treated with steroids,DVT, HTN, PE and DVT (on coumadin) who presented to ED for LLE wound with worsening pain, swelling and redness admitted to burn service for IV antibiotics, local wound care and possible OR debridement.     Plan    LLE Wound - Cellulitis  - start unasyn/cipro  - LWC; ssd/a/k BID   - leg elevation  - F/u duplex r/o DVT   - Possible OR debridement   - ID consult   - pain control       Autoimmune hepatitis - continue home medications    Hypertension - blood pressure control, continue home medications    H/o DVT/PE - continue with coumadin, may hold and start heparin drip if patient going to OR

## 2019-01-02 NOTE — PROGRESS NOTE ADULT - SUBJECTIVE AND OBJECTIVE BOX
new wound and infection left leg--> chills    Vital Signs Last 24 Hrs  T(C): 36.2 (02 Jan 2019 19:45), Max: 36.2 (02 Jan 2019 19:45)  T(F): 97.1 (02 Jan 2019 19:45), Max: 97.1 (02 Jan 2019 19:45)  HR: 81 (02 Jan 2019 19:45) (78 - 85)  BP: 142/64 (02 Jan 2019 19:45) (142/64 - 142/91)  BP(mean): --  RR: 18 (02 Jan 2019 19:45) (18 - 20)  SpO2: 98% (02 Jan 2019 19:45) (96% - 98%)    CVP:  T(C): 36.2 (01-02-19 @ 19:45), Max: 36.2 (01-02-19 @ 19:45)  HR: 81 (01-02-19 @ 19:45) (78 - 85)  BP: 142/64 (01-02-19 @ 19:45) (142/64 - 142/91)  RR: 18 (01-02-19 @ 19:45) (18 - 20)  SpO2: 98% (01-02-19 @ 19:45) (96% - 98%)  CVP(mm Hg): --    U.O.:  I&O's Detail                                    12.6   16.31 )-----------( 275      ( 02 Jan 2019 12:53 )             39.7     01-02    144  |  101  |  18  ----------------------------<  138<H>  4.4   |  26  |  1.2    Ca    8.6      02 Jan 2019 12:53    TPro  6.1  /  Alb  3.2<L>  /  TBili  0.7  /  DBili  x   /  AST  32  /  ALT  50<H>  /  AlkPhos  157<H>  01-02      Large Dressing Change--> 3x3 cm ulcer left prox antereior lower leg with edema and erythema left leg and foot    Proc: sharp excisional debridement and culture sent to and including subcut tissue

## 2019-01-02 NOTE — H&P ADULT - NSHPLABSRESULTS_GEN_ALL_CORE
12.6   16.31 )-----------( 275      ( 02 Jan 2019 12:53 )             39.7   01-02    144  |  101  |  18  ----------------------------<  138<H>  4.4   |  26  |  1.2    Ca    8.6      02 Jan 2019 12:53    TPro  6.1  /  Alb  3.2<L>  /  TBili  0.7  /  DBili  x   /  AST  32  /  ALT  50<H>  /  AlkPhos  157<H>  01-02      EXAM:  XR TIB FIB AP LAT 2 VIEWS LT            PROCEDURE DATE:  01/02/2019      INTERPRETATION:  Clinical History / Reason for exam: Swelling    Comparison: 8/12/2018    Technique: 2 views of left tibia and fibula    Findings/  impression:    There is diffuse soft tissue swelling of the lower leg.   No evidence of acute osseous abnormalities.  Vascular calcifications.   Heel spur.

## 2019-01-02 NOTE — ED PROVIDER NOTE - NS ED ROS FT
Constitutional: (-) fever, (-) chills,   Eyes: (-) visual changes  Cardiovascular: (-) chest pain, (-) syncope  Respiratory: (-) cough, (-) shortness of breath, (-) dyspnea,   Gastrointestinal: (-) vomiting, (-) diarrhea, (-)nausea,  Musculoskeletal: (-) neck pain, (-) back pain, (-) joint pain, (+) leg swelling  Integumentary: (-) rash, (+) edema, (+) ulcer  Neurological: (-) headache,  (-) dizziness, (-) tingling, (-)numbness,   Peripheral Vascular: (+) pain while walking, (+) leg swelling, (-) color changes  : (-)dysuria  Allergic/Immunologic: (-) pruritus

## 2019-01-03 ENCOUNTER — TRANSCRIPTION ENCOUNTER (OUTPATIENT)
Age: 74
End: 2019-01-03

## 2019-01-03 LAB
ANION GAP SERPL CALC-SCNC: 16 MMOL/L — HIGH (ref 7–14)
APTT BLD: 29.7 SEC — SIGNIFICANT CHANGE UP (ref 27–39.2)
BLD GP AB SCN SERPL QL: SIGNIFICANT CHANGE UP
BUN SERPL-MCNC: 17 MG/DL — SIGNIFICANT CHANGE UP (ref 10–20)
CALCIUM SERPL-MCNC: 8.5 MG/DL — SIGNIFICANT CHANGE UP (ref 8.5–10.1)
CHLORIDE SERPL-SCNC: 103 MMOL/L — SIGNIFICANT CHANGE UP (ref 98–110)
CO2 SERPL-SCNC: 27 MMOL/L — SIGNIFICANT CHANGE UP (ref 17–32)
CREAT SERPL-MCNC: 1.1 MG/DL — SIGNIFICANT CHANGE UP (ref 0.7–1.5)
GLUCOSE SERPL-MCNC: 170 MG/DL — HIGH (ref 70–99)
HCT VFR BLD CALC: 37.8 % — SIGNIFICANT CHANGE UP (ref 37–47)
HGB BLD-MCNC: 11.9 G/DL — LOW (ref 12–16)
INR BLD: 2.08 RATIO — HIGH (ref 0.65–1.3)
MAGNESIUM SERPL-MCNC: 1.8 MG/DL — SIGNIFICANT CHANGE UP (ref 1.8–2.4)
MCHC RBC-ENTMCNC: 27.2 PG — SIGNIFICANT CHANGE UP (ref 27–31)
MCHC RBC-ENTMCNC: 31.5 G/DL — LOW (ref 32–37)
MCV RBC AUTO: 86.3 FL — SIGNIFICANT CHANGE UP (ref 81–99)
NRBC # BLD: 0 /100 WBCS — SIGNIFICANT CHANGE UP (ref 0–0)
PHOSPHATE SERPL-MCNC: 3.1 MG/DL — SIGNIFICANT CHANGE UP (ref 2.1–4.9)
PLATELET # BLD AUTO: 272 K/UL — SIGNIFICANT CHANGE UP (ref 130–400)
POTASSIUM SERPL-MCNC: 4.3 MMOL/L — SIGNIFICANT CHANGE UP (ref 3.5–5)
POTASSIUM SERPL-SCNC: 4.3 MMOL/L — SIGNIFICANT CHANGE UP (ref 3.5–5)
PROTHROM AB SERPL-ACNC: 23.8 SEC — HIGH (ref 9.95–12.87)
RBC # BLD: 4.38 M/UL — SIGNIFICANT CHANGE UP (ref 4.2–5.4)
RBC # FLD: 15.7 % — HIGH (ref 11.5–14.5)
SODIUM SERPL-SCNC: 146 MMOL/L — SIGNIFICANT CHANGE UP (ref 135–146)
TYPE + AB SCN PNL BLD: SIGNIFICANT CHANGE UP
WBC # BLD: 11.81 K/UL — HIGH (ref 4.8–10.8)
WBC # FLD AUTO: 11.81 K/UL — HIGH (ref 4.8–10.8)

## 2019-01-03 RX ORDER — SODIUM CHLORIDE 9 MG/ML
1000 INJECTION, SOLUTION INTRAVENOUS
Qty: 0 | Refills: 0 | Status: DISCONTINUED | OUTPATIENT
Start: 2019-01-03 | End: 2019-01-04

## 2019-01-03 RX ORDER — VANCOMYCIN HCL 1 G
1000 VIAL (EA) INTRAVENOUS EVERY 12 HOURS
Qty: 0 | Refills: 0 | Status: DISCONTINUED | OUTPATIENT
Start: 2019-01-03 | End: 2019-01-04

## 2019-01-03 RX ADMIN — Medication 3 MILLIGRAM(S): at 15:45

## 2019-01-03 RX ADMIN — SODIUM CHLORIDE 50 MILLILITER(S): 9 INJECTION, SOLUTION INTRAVENOUS at 01:30

## 2019-01-03 RX ADMIN — AMLODIPINE BESYLATE 5 MILLIGRAM(S): 2.5 TABLET ORAL at 06:04

## 2019-01-03 RX ADMIN — AMPICILLIN SODIUM AND SULBACTAM SODIUM 200 GRAM(S): 250; 125 INJECTION, POWDER, FOR SUSPENSION INTRAMUSCULAR; INTRAVENOUS at 23:49

## 2019-01-03 RX ADMIN — Medication 3 MILLIGRAM(S): at 06:03

## 2019-01-03 RX ADMIN — SODIUM CHLORIDE 75 MILLILITER(S): 9 INJECTION, SOLUTION INTRAVENOUS at 23:55

## 2019-01-03 RX ADMIN — Medication 250 MILLIGRAM(S): at 18:44

## 2019-01-03 RX ADMIN — Medication 200 MILLIGRAM(S): at 06:04

## 2019-01-03 RX ADMIN — GABAPENTIN 100 MILLIGRAM(S): 400 CAPSULE ORAL at 06:04

## 2019-01-03 RX ADMIN — Medication 1 APPLICATION(S): at 06:05

## 2019-01-03 RX ADMIN — TACROLIMUS 1 MILLIGRAM(S): 5 CAPSULE ORAL at 12:18

## 2019-01-03 RX ADMIN — Medication 100 MILLIGRAM(S): at 06:04

## 2019-01-03 RX ADMIN — Medication 25 MILLIGRAM(S): at 06:05

## 2019-01-03 RX ADMIN — Medication 3 MILLIGRAM(S): at 22:25

## 2019-01-03 RX ADMIN — TACROLIMUS 0.5 MILLIGRAM(S): 5 CAPSULE ORAL at 22:25

## 2019-01-03 RX ADMIN — GABAPENTIN 100 MILLIGRAM(S): 400 CAPSULE ORAL at 22:26

## 2019-01-03 RX ADMIN — AMPICILLIN SODIUM AND SULBACTAM SODIUM 200 GRAM(S): 250; 125 INJECTION, POWDER, FOR SUSPENSION INTRAMUSCULAR; INTRAVENOUS at 01:56

## 2019-01-03 RX ADMIN — MONTELUKAST 10 MILLIGRAM(S): 4 TABLET, CHEWABLE ORAL at 22:25

## 2019-01-03 RX ADMIN — AMPICILLIN SODIUM AND SULBACTAM SODIUM 200 GRAM(S): 250; 125 INJECTION, POWDER, FOR SUSPENSION INTRAMUSCULAR; INTRAVENOUS at 06:04

## 2019-01-03 RX ADMIN — Medication 10 MILLIGRAM(S): at 06:04

## 2019-01-03 RX ADMIN — AMPICILLIN SODIUM AND SULBACTAM SODIUM 200 GRAM(S): 250; 125 INJECTION, POWDER, FOR SUSPENSION INTRAMUSCULAR; INTRAVENOUS at 17:43

## 2019-01-03 RX ADMIN — Medication 1 APPLICATION(S): at 17:44

## 2019-01-03 RX ADMIN — AMPICILLIN SODIUM AND SULBACTAM SODIUM 200 GRAM(S): 250; 125 INJECTION, POWDER, FOR SUSPENSION INTRAMUSCULAR; INTRAVENOUS at 12:17

## 2019-01-03 RX ADMIN — GABAPENTIN 100 MILLIGRAM(S): 400 CAPSULE ORAL at 15:45

## 2019-01-03 RX ADMIN — Medication 20 MILLIGRAM(S): at 06:04

## 2019-01-03 NOTE — DISCHARGE NOTE ADULT - ADDITIONAL INSTRUCTIONS
Wound care to be performed twice daily. OK to bathe with wound care. Wash wounds with warm, soapy water and a wash cloth.  Dress open area to the left leg with Xeroform and secure in place with Kerlix and ACE wrap. Monitor for signs of infection including fever, chills, redness, swelling, increased pain or foul smelling drainage. Follow-up in Burn Clinic in 1 week. Burn Clinic is located at [address] [place] [address] in [place]. Please call [phone] to make an appointment. Wound care to be performed twice daily. OK to bathe with wound care. Wash wounds with warm, soapy water and a wash cloth.  Dress open area to the left leg with Xeroform and secure in place with Kerlix and ACE wrap. Monitor for signs of infection including fever, chills, redness, swelling, increased pain or foul smelling drainage. Follow-up in Burn Clinic on Thursday 1/10/18. Burn Clinic is located at [address] [place] [address] in [place]. Please call [phone] to make an appointment.

## 2019-01-03 NOTE — CONSULT NOTE ADULT - SUBJECTIVE AND OBJECTIVE BOX
DONI PATRICK  73y, Female  Allergy: No Known Allergies      HPI:  73y female with pmh of Asthma, Autoimmune hepatitis treated with steroids,DVT, HTN, PE and DVT (on coumadin) who presented to ED for LLE wound with worsening pain, swelling and redness. Patient states her left leg is also slightly more swollen than her right, but she noticed on Sunday there was a small blood blister on her shin below her knee. Since then LLE has become more swollen, redness increased from surrounding the wound and spread to her leg and pain has worsened. Patient reports chills, but no fever. Patient denies SOB, chest pain, dysuria, nausea or vomiting. (02 Jan 2019 15:05)    FAMILY HISTORY:  No pertinent family history in first degree relatives    PAST MEDICAL & SURGICAL HISTORY:  Autoimmune hepatitis  Other chronic pulmonary embolism without acute cor pulmonale  Essential hypertension  Autoimmune hepatitis treated with steroids  Asthma  DVT (deep venous thrombosis)  S/P debridement  History of back surgery        VITALS:  T(F): 96.8, Max: 98.1 (01-03-19 @ 05:47)  HR: 96  BP: 134/68  RR: 18Vital Signs Last 24 Hrs  T(C): 36 (03 Jan 2019 13:34), Max: 36.7 (03 Jan 2019 05:47)  T(F): 96.8 (03 Jan 2019 13:34), Max: 98.1 (03 Jan 2019 05:47)  HR: 96 (03 Jan 2019 13:34) (78 - 102)  BP: 134/68 (03 Jan 2019 13:34) (134/64 - 164/88)  BP(mean): --  RR: 18 (03 Jan 2019 05:47) (18 - 18)  SpO2: 98% (03 Jan 2019 00:40) (96% - 98%)    TESTS & MEASUREMENTS:                        12.6   16.31 )-----------( 275      ( 02 Jan 2019 12:53 )             39.7     01-02    144  |  101  |  18  ----------------------------<  138<H>  4.4   |  26  |  1.2    Ca    8.6      02 Jan 2019 12:53    TPro  6.1  /  Alb  3.2<L>  /  TBili  0.7  /  DBili  x   /  AST  32  /  ALT  50<H>  /  AlkPhos  157<H>  01-02    LIVER FUNCTIONS - ( 02 Jan 2019 12:53 )  Alb: 3.2 g/dL / Pro: 6.1 g/dL / ALK PHOS: 157 U/L / ALT: 50 U/L / AST: 32 U/L / GGT: x                   RADIOLOGY & ADDITIONAL TESTS:    ANTIBIOTICS:  ampicillin/sulbactam  IVPB 3 Gram(s) IV Intermittent every 6 hours  ciprofloxacin   IVPB 400 milliGRAM(s) IV Intermittent every 12 hours

## 2019-01-03 NOTE — DISCHARGE NOTE ADULT - HOSPITAL COURSE
Griselda Hudson is a 74 yo female with PMH significant for Asthma, Autoimmune hepatitis treated with steroids, HTN, PE and DVT (on coumadin) who presented to the ED on 1/2/19 for LLE wound with worsening pain, swelling and redness. Patient stated that on Sunday she noticed (12/30) that there was a small blood blister on her shin below her knee. Since then LLE has become more swollen and painful, and redness around the wound has increased in size. Patient is admitted to the burn service and started on IV fluids and antibiotics including IV vancomycin and Unasyn. Wound culture obtained 1/2 is positive for MRSA. Patient was taken to the OR on 1/4 undergoing debridement of the left lower leg wound. On POD #1 wound appears healthy and erythema and swelling improved. By POD #2 the wound appears ***    Per records obtained from patient's hematologist patient has a heterozygous prothrombin gene mutation for which she is being treated with Coumadin indefinitely. As Coumadin was held for OR 1/4, patient will require a bridging back to Coumadin with Lovenox at 1mg/kg BID until therapeutic, for a minimum of 5 days. Patient was restarted on her home dose of Coumadin (2mg QHS) on 1/5. Patient will follow-up with her outpatient hematologist on discharge. Griselda Hudson is a 74 yo female with PMH significant for Asthma, Autoimmune hepatitis treated with steroids, HTN, PE and DVT (on coumadin) who presented to the ED on 1/2/19 for LLE wound with worsening pain, swelling and redness. Patient stated that on Sunday she noticed (12/30) that there was a small blood blister on her shin below her knee. Since then LLE has become more swollen and painful, and redness around the wound has increased in size. Patient is admitted to the burn service and started on IV fluids and antibiotics including IV vancomycin and Unasyn. Wound culture obtained 1/2 is positive for MRSA. Patient was taken to the OR on 1/4 undergoing debridement of the left lower leg wound. Patient will follow-up in Burn Clinic on Thursday 1/10 for wound check.     Per records obtained from patient's hematologist patient has a heterozygous prothrombin gene mutation for which she is being treated with Coumadin indefinitely. As Coumadin was held for OR 1/4, patient will require a bridging back to Coumadin with Lovenox at 1mg/kg BID until therapeutic, for a minimum of 5 days. Patient was restarted on her home dose of Coumadin (2mg QHS) on 1/5. Patient will follow-up in the Coumadin Clinic on Tuesday 1/8 at 9:30 am and will continue to follow-up with her outpatient hematologist as scheduled.

## 2019-01-03 NOTE — PATIENT PROFILE ADULT - NSPROEDALEARNPREF_GEN_A_NUR
Attending MD Esteban.  Agree with above.  Pt is a 36 yr old female presenting to ED with complaint body itching x 2 wks.  She states that she began to note 'bumps on her skin' with pruritis 2 days after she started drinking apple cider vinegar.  Pt was seen by PCP prior to starting apple cider vinegar with complaints of dizziness and feeling light-headed.  PMD stated that she had elevated cholesterol and advised tp to lose weight for which she began a regimen of apple cider vinegar.  Pt stopped taking this 2 days ago.  She denies any changes in food/lotions/detergents otherwise.  She's also noted some SHARMA for the last few weeks.  Denies sensation of throat closing, difficulty breathing, CP, abdominal pain, n/v, fevers. Attending MD Esteban.  Agree with above.  Pt is a 36 yr old female presenting to ED with complaint body itching x 2 wks.  She states that she began to note 'bumps on her skin' with pruritis 2 days after she started drinking apple cider vinegar.  Pt was seen by PCP prior to starting apple cider vinegar with complaints of dizziness and feeling light-headed.  PMD stated that she had elevated cholesterol and advised tp to lose weight for which she began a regimen of apple cider vinegar.  Pt stopped taking this 2 days ago.  She denies any changes in food/lotions/detergents otherwise.  She's also noted some SHARMA for the last few weeks.  Denies sensation of throat closing, difficulty breathing, CP, abdominal pain, n/v, fevers.  Pt also endorses ~3 mos of generalized weakness, malaise, unintentional weight gain.  Denies possible pregnancy.  Has not been sexually active in over 6 mos.  Pt has one 4 yr old son.  Hx of 2 pregnancies.  Under increased stress 2/2 separation from son's father since December and move 3 mos ago.  Son shares bed.  He has no rash.  On exam pt is afocal but generally poor muscle strength.  Slightly flat affect.  States that she's seen PCP for these concerns and has been told that she has HLD and 'low blood pressure' and 'low heart rate'. States that prior to 3 mos ago she felt in her usual state of health.  Denies intertrignoal focus of rash x 2 wks.  No involvement of palms/soles.  Rash is minimally apparent possibly 2/2 skin tone however pt endorses pruritis.  She's applied lavender oil to sites occasionally however denies resolution/improvement.  No hx of similar sxs.  No known hx of thyroid disease.  No known family hx of thyroid disease.  No CP.  Occasional SOB/SHARMA.  Takes not routine medications. Denies LE pain/swelling.  Pt is a non-smoker.  No abdominal pain.  Does endorse occasional nasuea without vomiting.  No falls/head injuries.  No focal joint/body pain. Attending MD Esteban.  Agree with above.  Pt is a 36 yr old female presenting to ED with complaint body itching x 2 wks.  She states that she began to note 'bumps on her skin' with pruritis 2 days after she started drinking apple cider vinegar.  Pt was seen by PCP prior to starting apple cider vinegar with complaints of dizziness and feeling light-headed.  PMD stated that she had elevated cholesterol and advised tp to lose weight for which she began a regimen of apple cider vinegar.  Pt stopped taking this 2 days ago.  She denies any changes in food/lotions/detergents otherwise.  She's also noted some SHARMA for the last few weeks.  Denies sensation of throat closing, difficulty breathing, CP, abdominal pain, n/v, fevers.  Pt also endorses ~3 mos of generalized weakness, malaise, unintentional weight gain.  Denies possible pregnancy.  Has not been sexually active in over 6 mos.  Pt has one 4 yr old son.  Hx of 2 pregnancies.  Under increased stress 2/2 separation from son's father since December and move 3 mos ago.  Son shares bed.  He has no rash.  On exam pt is afocal but generally poor muscle strength.  Slightly flat affect.  States that she's seen PCP for these concerns and has been told that she has HLD and 'low blood pressure' and 'low heart rate'. States that prior to 3 mos ago she felt in her usual state of health.  Denies intertrignoal focus of rash x 2 wks.  No involvement of palms/soles.  Rash is minimally apparent possibly 2/2 skin tone however pt endorses pruritis.  She's applied lavender oil to sites occasionally however denies resolution/improvement.  No hx of similar sxs.  No known hx of thyroid disease.  No known family hx of thyroid disease.  No CP.  Occasional SOB/SHARMA.  Takes not routine medications. Denies LE pain/swelling.  Pt is a non-smoker.  No abdominal pain.  Does endorse occasional nasuea without vomiting.  No falls/head injuries.  No focal joint/body pain.  No syncope/pre-syncope but pt endorses feeling generally weak, vaguely confused, and 'off'.  Pt is A & O x 3 at this time. written material

## 2019-01-03 NOTE — CONSULT NOTE ADULT - ASSESSMENT
IMPRESSION:  Cellulitis LLE  No sepsis    RECOMMENDATIONS:  Nares for ORSA  Vancomycin 1 gm iv q12h  Unasyn 3 gm iv q6h  D/c cipro

## 2019-01-03 NOTE — DISCHARGE NOTE ADULT - PATIENT PORTAL LINK FT
You can access the [facility] Patient Portal, offered by [facility], by registering with the following website: [email or web address]

## 2019-01-03 NOTE — DISCHARGE NOTE ADULT - CARE PROVIDER_API CALL
Enrike Kang), Plastic Surgery  [address] [place] [address]  Phone: [phone]  Fax: [phone]    Larry Cary), Plastic Surgery  [address] [place] [address]  Phone: [phone]  Fax: [phone]

## 2019-01-03 NOTE — DISCHARGE NOTE ADULT - PLAN OF CARE
Wound healing Full thickness wound to left lower leg. Please call [phone] to make a follow up appointment within 1 week with Dr. Kang or Dr. Cary. Clinic is located at [address] [place] [address] on Tuesdays (2-4pm) or Thursdays (9am-1pm). Full thickness wound to left lower leg. Continue twice daily dressing changes with Xeroform, Kerlix and ACE wrap. Follow-up in Burn Clinic in 1 week.

## 2019-01-03 NOTE — PROGRESS NOTE ADULT - SUBJECTIVE AND OBJECTIVE BOX
PM rounds    Pt admitted with worsening swelling, erythma, pain and necrotic wound of left upper leg    EXAM:   awake alert in good spirits    LLE - significant tender swelling and erythema left leg to foot   black necrotic 3 x 3 cm wound of anterior leg with surrounding increased erythema and tenderness   no gross purulence     dressing changed - SSD applied

## 2019-01-03 NOTE — DISCHARGE NOTE ADULT - CARE PLAN
Principal Discharge DX:	Leg ulcer  Goal:	Wound healing  Assessment and plan of treatment:	Full thickness wound to left lower leg. Please call [phone] to make a follow up appointment within 1 week with Dr. Kang or Dr. Cary. Clinic is located at [address] [place] [address] on Tuesdays (2-4pm) or Thursdays (9am-1pm).  Secondary Diagnosis:	S/P debridement Principal Discharge DX:	Leg ulcer  Goal:	Wound healing  Assessment and plan of treatment:	Full thickness wound to left lower leg. Continue twice daily dressing changes with Xeroform, Kerlix and ACE wrap. Follow-up in Burn Clinic in 1 week.  Secondary Diagnosis:	S/P debridement

## 2019-01-03 NOTE — DISCHARGE NOTE ADULT - CARE PROVIDERS DIRECT ADDRESSES
[email or web address]

## 2019-01-04 ENCOUNTER — RESULT REVIEW (OUTPATIENT)
Age: 74
End: 2019-01-04

## 2019-01-04 LAB
-  AMPICILLIN/SULBACTAM: SIGNIFICANT CHANGE UP
-  CEFAZOLIN: SIGNIFICANT CHANGE UP
-  CLINDAMYCIN: SIGNIFICANT CHANGE UP
-  DAPTOMYCIN: SIGNIFICANT CHANGE UP
-  ERYTHROMYCIN: SIGNIFICANT CHANGE UP
-  GENTAMICIN: SIGNIFICANT CHANGE UP
-  LINEZOLID: SIGNIFICANT CHANGE UP
-  OXACILLIN: SIGNIFICANT CHANGE UP
-  PENICILLIN: SIGNIFICANT CHANGE UP
-  RIFAMPIN: SIGNIFICANT CHANGE UP
-  TETRACYCLINE: SIGNIFICANT CHANGE UP
-  TRIMETHOPRIM/SULFAMETHOXAZOLE: SIGNIFICANT CHANGE UP
-  VANCOMYCIN: SIGNIFICANT CHANGE UP
ANION GAP SERPL CALC-SCNC: 18 MMOL/L — HIGH (ref 7–14)
BASOPHILS # BLD AUTO: 0.02 K/UL — SIGNIFICANT CHANGE UP (ref 0–0.2)
BASOPHILS NFR BLD AUTO: 0.2 % — SIGNIFICANT CHANGE UP (ref 0–1)
BUN SERPL-MCNC: 13 MG/DL — SIGNIFICANT CHANGE UP (ref 10–20)
CALCIUM SERPL-MCNC: 8.5 MG/DL — SIGNIFICANT CHANGE UP (ref 8.5–10.1)
CHLORIDE SERPL-SCNC: 102 MMOL/L — SIGNIFICANT CHANGE UP (ref 98–110)
CO2 SERPL-SCNC: 26 MMOL/L — SIGNIFICANT CHANGE UP (ref 17–32)
CREAT SERPL-MCNC: 1 MG/DL — SIGNIFICANT CHANGE UP (ref 0.7–1.5)
CULTURE RESULTS: SIGNIFICANT CHANGE UP
EOSINOPHIL # BLD AUTO: 0.02 K/UL — SIGNIFICANT CHANGE UP (ref 0–0.7)
EOSINOPHIL NFR BLD AUTO: 0.2 % — SIGNIFICANT CHANGE UP (ref 0–8)
GLUCOSE SERPL-MCNC: 139 MG/DL — HIGH (ref 70–99)
HCT VFR BLD CALC: 38.9 % — SIGNIFICANT CHANGE UP (ref 37–47)
HGB BLD-MCNC: 12.3 G/DL — SIGNIFICANT CHANGE UP (ref 12–16)
IMM GRANULOCYTES NFR BLD AUTO: 1 % — HIGH (ref 0.1–0.3)
LYMPHOCYTES # BLD AUTO: 18.8 % — LOW (ref 20.5–51.1)
LYMPHOCYTES # BLD AUTO: 2.17 K/UL — SIGNIFICANT CHANGE UP (ref 1.2–3.4)
MAGNESIUM SERPL-MCNC: 1.6 MG/DL — LOW (ref 1.8–2.4)
MCHC RBC-ENTMCNC: 26.7 PG — LOW (ref 27–31)
MCHC RBC-ENTMCNC: 31.6 G/DL — LOW (ref 32–37)
MCV RBC AUTO: 84.6 FL — SIGNIFICANT CHANGE UP (ref 81–99)
METHOD TYPE: SIGNIFICANT CHANGE UP
MONOCYTES # BLD AUTO: 0.52 K/UL — SIGNIFICANT CHANGE UP (ref 0.1–0.6)
MONOCYTES NFR BLD AUTO: 4.5 % — SIGNIFICANT CHANGE UP (ref 1.7–9.3)
NEUTROPHILS # BLD AUTO: 8.72 K/UL — HIGH (ref 1.4–6.5)
NEUTROPHILS NFR BLD AUTO: 75.3 % — HIGH (ref 42.2–75.2)
NRBC # BLD: 0 /100 WBCS — SIGNIFICANT CHANGE UP (ref 0–0)
ORGANISM # SPEC MICROSCOPIC CNT: SIGNIFICANT CHANGE UP
ORGANISM # SPEC MICROSCOPIC CNT: SIGNIFICANT CHANGE UP
PHOSPHATE SERPL-MCNC: 3.4 MG/DL — SIGNIFICANT CHANGE UP (ref 2.1–4.9)
PLATELET # BLD AUTO: 297 K/UL — SIGNIFICANT CHANGE UP (ref 130–400)
POTASSIUM SERPL-MCNC: 3.3 MMOL/L — LOW (ref 3.5–5)
POTASSIUM SERPL-SCNC: 3.3 MMOL/L — LOW (ref 3.5–5)
RBC # BLD: 4.6 M/UL — SIGNIFICANT CHANGE UP (ref 4.2–5.4)
RBC # FLD: 15.5 % — HIGH (ref 11.5–14.5)
SODIUM SERPL-SCNC: 146 MMOL/L — SIGNIFICANT CHANGE UP (ref 135–146)
SPECIMEN SOURCE: SIGNIFICANT CHANGE UP
WBC # BLD: 11.56 K/UL — HIGH (ref 4.8–10.8)
WBC # FLD AUTO: 11.56 K/UL — HIGH (ref 4.8–10.8)

## 2019-01-04 RX ORDER — METOPROLOL TARTRATE 50 MG
25 TABLET ORAL DAILY
Qty: 0 | Refills: 0 | Status: DISCONTINUED | OUTPATIENT
Start: 2019-01-04 | End: 2019-01-07

## 2019-01-04 RX ORDER — HYDROMORPHONE HYDROCHLORIDE 2 MG/ML
1 INJECTION INTRAMUSCULAR; INTRAVENOUS; SUBCUTANEOUS
Qty: 0 | Refills: 0 | Status: DISCONTINUED | OUTPATIENT
Start: 2019-01-04 | End: 2019-01-04

## 2019-01-04 RX ORDER — AMPICILLIN SODIUM AND SULBACTAM SODIUM 250; 125 MG/ML; MG/ML
3 INJECTION, POWDER, FOR SUSPENSION INTRAMUSCULAR; INTRAVENOUS EVERY 6 HOURS
Qty: 0 | Refills: 0 | Status: DISCONTINUED | OUTPATIENT
Start: 2019-01-04 | End: 2019-01-06

## 2019-01-04 RX ORDER — SODIUM CHLORIDE 9 MG/ML
1000 INJECTION, SOLUTION INTRAVENOUS
Qty: 0 | Refills: 0 | Status: DISCONTINUED | OUTPATIENT
Start: 2019-01-04 | End: 2019-01-04

## 2019-01-04 RX ORDER — FUROSEMIDE 40 MG
20 TABLET ORAL DAILY
Qty: 0 | Refills: 0 | Status: DISCONTINUED | OUTPATIENT
Start: 2019-01-04 | End: 2019-01-07

## 2019-01-04 RX ORDER — VANCOMYCIN HCL 1 G
1000 VIAL (EA) INTRAVENOUS EVERY 12 HOURS
Qty: 0 | Refills: 0 | Status: DISCONTINUED | OUTPATIENT
Start: 2019-01-04 | End: 2019-01-07

## 2019-01-04 RX ORDER — MORPHINE SULFATE 50 MG/1
2 CAPSULE, EXTENDED RELEASE ORAL EVERY 6 HOURS
Qty: 0 | Refills: 0 | Status: DISCONTINUED | OUTPATIENT
Start: 2019-01-04 | End: 2019-01-07

## 2019-01-04 RX ORDER — SENNA PLUS 8.6 MG/1
2 TABLET ORAL AT BEDTIME
Qty: 0 | Refills: 0 | Status: DISCONTINUED | OUTPATIENT
Start: 2019-01-04 | End: 2019-01-07

## 2019-01-04 RX ORDER — DOCUSATE SODIUM 100 MG
100 CAPSULE ORAL THREE TIMES A DAY
Qty: 0 | Refills: 0 | Status: DISCONTINUED | OUTPATIENT
Start: 2019-01-04 | End: 2019-01-07

## 2019-01-04 RX ORDER — ACETAMINOPHEN 500 MG
650 TABLET ORAL EVERY 6 HOURS
Qty: 0 | Refills: 0 | Status: DISCONTINUED | OUTPATIENT
Start: 2019-01-04 | End: 2019-01-07

## 2019-01-04 RX ORDER — MAGNESIUM SULFATE 500 MG/ML
2 VIAL (ML) INJECTION ONCE
Qty: 0 | Refills: 0 | Status: COMPLETED | OUTPATIENT
Start: 2019-01-04 | End: 2019-01-04

## 2019-01-04 RX ORDER — ONDANSETRON 8 MG/1
4 TABLET, FILM COATED ORAL ONCE
Qty: 0 | Refills: 0 | Status: DISCONTINUED | OUTPATIENT
Start: 2019-01-04 | End: 2019-01-04

## 2019-01-04 RX ORDER — MONTELUKAST 4 MG/1
10 TABLET, CHEWABLE ORAL AT BEDTIME
Qty: 0 | Refills: 0 | Status: DISCONTINUED | OUTPATIENT
Start: 2019-01-04 | End: 2019-01-07

## 2019-01-04 RX ORDER — HYDROMORPHONE HYDROCHLORIDE 2 MG/ML
0.5 INJECTION INTRAMUSCULAR; INTRAVENOUS; SUBCUTANEOUS
Qty: 0 | Refills: 0 | Status: DISCONTINUED | OUTPATIENT
Start: 2019-01-04 | End: 2019-01-04

## 2019-01-04 RX ORDER — SODIUM CHLORIDE 9 MG/ML
1000 INJECTION, SOLUTION INTRAVENOUS
Qty: 0 | Refills: 0 | Status: DISCONTINUED | OUTPATIENT
Start: 2019-01-04 | End: 2019-01-07

## 2019-01-04 RX ORDER — TACROLIMUS 5 MG/1
0.5 CAPSULE ORAL AT BEDTIME
Qty: 0 | Refills: 0 | Status: DISCONTINUED | OUTPATIENT
Start: 2019-01-04 | End: 2019-01-07

## 2019-01-04 RX ORDER — WARFARIN SODIUM 2.5 MG/1
2 TABLET ORAL ONCE
Qty: 0 | Refills: 0 | Status: DISCONTINUED | OUTPATIENT
Start: 2019-01-05 | End: 2019-01-07

## 2019-01-04 RX ORDER — BUDESONIDE, MICRONIZED 100 %
3 POWDER (GRAM) MISCELLANEOUS
Qty: 0 | Refills: 0 | Status: DISCONTINUED | OUTPATIENT
Start: 2019-01-04 | End: 2019-01-07

## 2019-01-04 RX ORDER — WARFARIN SODIUM 2.5 MG/1
2 TABLET ORAL ONCE
Qty: 0 | Refills: 0 | Status: COMPLETED | OUTPATIENT
Start: 2019-01-04 | End: 2019-01-04

## 2019-01-04 RX ORDER — TACROLIMUS 5 MG/1
1 CAPSULE ORAL DAILY
Qty: 0 | Refills: 0 | Status: DISCONTINUED | OUTPATIENT
Start: 2019-01-04 | End: 2019-01-07

## 2019-01-04 RX ORDER — GABAPENTIN 400 MG/1
100 CAPSULE ORAL EVERY 8 HOURS
Qty: 0 | Refills: 0 | Status: DISCONTINUED | OUTPATIENT
Start: 2019-01-04 | End: 2019-01-07

## 2019-01-04 RX ORDER — DEXAMETHASONE 0.5 MG/5ML
8 ELIXIR ORAL ONCE
Qty: 0 | Refills: 0 | Status: DISCONTINUED | OUTPATIENT
Start: 2019-01-04 | End: 2019-01-04

## 2019-01-04 RX ORDER — POTASSIUM CHLORIDE 20 MEQ
40 PACKET (EA) ORAL ONCE
Qty: 0 | Refills: 0 | Status: COMPLETED | OUTPATIENT
Start: 2019-01-04 | End: 2019-01-04

## 2019-01-04 RX ORDER — AMLODIPINE BESYLATE 2.5 MG/1
5 TABLET ORAL DAILY
Qty: 0 | Refills: 0 | Status: DISCONTINUED | OUTPATIENT
Start: 2019-01-04 | End: 2019-01-07

## 2019-01-04 RX ORDER — MEPERIDINE HYDROCHLORIDE 50 MG/ML
12.5 INJECTION INTRAMUSCULAR; INTRAVENOUS; SUBCUTANEOUS
Qty: 0 | Refills: 0 | Status: DISCONTINUED | OUTPATIENT
Start: 2019-01-04 | End: 2019-01-04

## 2019-01-04 RX ORDER — OXYCODONE AND ACETAMINOPHEN 5; 325 MG/1; MG/1
1 TABLET ORAL EVERY 6 HOURS
Qty: 0 | Refills: 0 | Status: DISCONTINUED | OUTPATIENT
Start: 2019-01-04 | End: 2019-01-07

## 2019-01-04 RX ADMIN — Medication 3 MILLIGRAM(S): at 17:39

## 2019-01-04 RX ADMIN — Medication 25 MILLIGRAM(S): at 06:44

## 2019-01-04 RX ADMIN — MONTELUKAST 10 MILLIGRAM(S): 4 TABLET, CHEWABLE ORAL at 22:11

## 2019-01-04 RX ADMIN — Medication 50 GRAM(S): at 23:48

## 2019-01-04 RX ADMIN — Medication 1 APPLICATION(S): at 06:45

## 2019-01-04 RX ADMIN — Medication 3 MILLIGRAM(S): at 22:13

## 2019-01-04 RX ADMIN — Medication 20 MILLIGRAM(S): at 06:44

## 2019-01-04 RX ADMIN — Medication 250 MILLIGRAM(S): at 18:14

## 2019-01-04 RX ADMIN — AMPICILLIN SODIUM AND SULBACTAM SODIUM 200 GRAM(S): 250; 125 INJECTION, POWDER, FOR SUSPENSION INTRAMUSCULAR; INTRAVENOUS at 12:42

## 2019-01-04 RX ADMIN — GABAPENTIN 100 MILLIGRAM(S): 400 CAPSULE ORAL at 22:11

## 2019-01-04 RX ADMIN — TACROLIMUS 1 MILLIGRAM(S): 5 CAPSULE ORAL at 17:31

## 2019-01-04 RX ADMIN — Medication 250 MILLIGRAM(S): at 06:43

## 2019-01-04 RX ADMIN — AMPICILLIN SODIUM AND SULBACTAM SODIUM 200 GRAM(S): 250; 125 INJECTION, POWDER, FOR SUSPENSION INTRAMUSCULAR; INTRAVENOUS at 23:20

## 2019-01-04 RX ADMIN — GABAPENTIN 100 MILLIGRAM(S): 400 CAPSULE ORAL at 06:44

## 2019-01-04 RX ADMIN — Medication 3 MILLIGRAM(S): at 06:45

## 2019-01-04 RX ADMIN — AMPICILLIN SODIUM AND SULBACTAM SODIUM 200 GRAM(S): 250; 125 INJECTION, POWDER, FOR SUSPENSION INTRAMUSCULAR; INTRAVENOUS at 17:31

## 2019-01-04 RX ADMIN — TACROLIMUS 0.5 MILLIGRAM(S): 5 CAPSULE ORAL at 22:12

## 2019-01-04 RX ADMIN — AMPICILLIN SODIUM AND SULBACTAM SODIUM 200 GRAM(S): 250; 125 INJECTION, POWDER, FOR SUSPENSION INTRAMUSCULAR; INTRAVENOUS at 06:43

## 2019-01-04 RX ADMIN — Medication 40 MILLIEQUIVALENT(S): at 23:50

## 2019-01-04 RX ADMIN — SODIUM CHLORIDE 100 MILLILITER(S): 9 INJECTION, SOLUTION INTRAVENOUS at 11:26

## 2019-01-04 RX ADMIN — SODIUM CHLORIDE 75 MILLILITER(S): 9 INJECTION, SOLUTION INTRAVENOUS at 12:24

## 2019-01-04 RX ADMIN — AMLODIPINE BESYLATE 5 MILLIGRAM(S): 2.5 TABLET ORAL at 06:44

## 2019-01-04 RX ADMIN — WARFARIN SODIUM 2 MILLIGRAM(S): 2.5 TABLET ORAL at 22:11

## 2019-01-04 RX ADMIN — Medication 10 MILLIGRAM(S): at 06:44

## 2019-01-04 RX ADMIN — GABAPENTIN 100 MILLIGRAM(S): 400 CAPSULE ORAL at 13:26

## 2019-01-04 NOTE — BRIEF OPERATIVE NOTE - OPERATION/FINDINGS
necrotic tissue --> excision to and including subcut tissue
necrotic tissue and puru drainage--> excision to and including fascia

## 2019-01-04 NOTE — CONSULT NOTE ADULT - ATTENDING COMMENTS
Patient seen and examined by myself too. I agree with Dr. Wise's (Hem-Onc fellow) note above. Situation discussed with her and the patient.

## 2019-01-04 NOTE — BRIEF OPERATIVE NOTE - PRE-OP DX
Wound  01/02/2019  infected left legwound  Active  Enrike Kang
Wound  01/02/2019  infected left legwound  Active  Enrike Kang

## 2019-01-04 NOTE — PHARMACOTHERAPY INTERVENTION NOTE - COMMENTS
PA ordered metoprolol tartrate q24h;  I spoke to PA to clarify order and recommended change to XL.
left note for rounding medical team suggesting to add DVT prophylaxis  (Lovenox)

## 2019-01-04 NOTE — BRIEF OPERATIVE NOTE - PROCEDURE
<<-----Click on this checkbox to enter Procedure Debridement  01/04/2019  sharp excisional debridement and abscess drainage left lower [id number]  Active  [email or web address]

## 2019-01-04 NOTE — BRIEF OPERATIVE NOTE - POST-OP DX
Wound  01/02/2019  infected left lower leg wound  Active  Enrike Kang
Wound  01/02/2019  infected left lower leg wound  Active  Enrike Kang

## 2019-01-04 NOTE — CONSULT NOTE ADULT - ASSESSMENT
73 yof diagnosed with PE approximately two years prior to presentation and found to have prothrombin gene mutation.  In February 2017, she was admitted to the ICU with diffuse alveolar hemorrhage, and her coumadin was stopped.  While off coumadin, patient developed a L gastrocnemius DVT.  Repeat B/L LE venous duplex in August 2018 only showed chronic L gastrocnemius DVT.  Patient's coumadin was held secondary to a planned debridement of chronic LLE DVT by burn team, which was done on 1/4/2019.  Hematology consulted for recommendations regarding further anticoagulation, considering that additional procedures may be planned.    1. History of PE/L gastrocnemius DVT with prothrombin gene mutation-once hemostasis achieved, recommend restarting anticoagulation.  If further debridements are being planned, can give lovenox 1 mg/kg BID, holding two doses prior to planned surgery or heparin drip, holding 6 hours prior to planned surgery.  Once no further surgeries are being planned, can restart coumadin for goal INR of 2 to 3.  While coumadin is being bridged, patient will require bridging with weight-based lovenox or heparin for a minimum of 5 days. 73 year old female diagnosed with PE approximately two years prior to presentation and found to have heterozygous prothrombin gene mutation.  In February 2017, she was admitted to the ICU with diffuse alveolar hemorrhage, and her coumadin was stopped.  While off coumadin, patient developed a L gastrocnemius DVT.  Repeat B/L LE venous duplex in August 2018 only showed chronic L gastrocnemius DVT.  Patient's coumadin was held secondary to a planned debridement of chronic LLE DVT by burn team, which was done on 1/4/2019.  Hematology consulted for recommendations regarding further anticoagulation, considering that additional procedures may be planned.    1. History of PE/L gastrocnemius DVT, recurrent, with heterozygous prothrombin gene mutation. Given her past history of thrombotic events and the genetic mutation (although heterozygous), once hemostasis achieved, recommend restarting anticoagulation.  If further debridements are being planned, may give Lovenox 1 mg/kg BID, holding two doses prior to planned surgery or heparin drip, holding 6 hours prior to planned surgery.  Once no further surgeries are being planned, can restart Coumadin for goal INR of 2 to 3.  While Coumadin is being bridged, patient will require bridging with weight-based Lovenox or heparin for a minimum of 5 days.  However, from just skin debridement, the patient may not have any significant bleeding while on anticoagulation. 73 year old female diagnosed with PE approximately two years prior to presentation and found to have heterozygous prothrombin gene mutation.  In February 2017, she was admitted to the ICU with diffuse alveolar hemorrhage, and her coumadin was stopped.  While off coumadin, patient developed a L gastrocnemius DVT.  Repeat B/L LE venous duplex in August 2018 only showed chronic L gastrocnemius DVT.  Patient's coumadin was now held secondary to a planned debridement of chronic LLE DVT by burn team, which was done on 1/4/2019.  Hematology consulted for recommendations regarding further anticoagulation, considering that additional procedures may be planned.    1. History of PE/L gastrocnemius DVT, recurrent, with heterozygous prothrombin gene mutation. Given her past history of thrombotic events and the genetic mutation (although heterozygous), once hemostasis achieved, recommend restarting anticoagulation.  If further debridements are being planned, and significant bleeding is expected depending on the location and the extent of the procedure, may give Lovenox 1 mg/kg BID, holding two doses prior to planned surgery or heparin drip, holding 6 hours prior to planned surgery.  Once no further surgeries are being planned, can restart Coumadin for goal INR of 2 to 3.  While Coumadin is being bridged, patient will require bridging with weight-based Lovenox or heparin for a minimum of 5 days. If heparin is used, CBC to be monitored the first 5 days.  However, from just limited skin debridement, the patient may not have any significant bleeding while on anticoagulation. 73 year old female diagnosed with PE approximately two years prior to presentation and found to have heterozygous prothrombin gene mutation.  In February 2017, she was admitted to the ICU with diffuse alveolar hemorrhage, and her coumadin was stopped.  While off coumadin, patient developed a L gastrocnemius DVT.  Repeat B/L LE venous duplex in August 2018 only showed chronic L gastrocnemius DVT.  Patient's coumadin was now held secondary to a planned debridement of chronic LLE wound by burn team, which was done on 1/4/2019.  Hematology consulted for recommendations regarding further anticoagulation, considering that additional procedures may be planned.    1. History of PE/L gastrocnemius DVT, recurrent, with heterozygous prothrombin gene mutation. Given her past history of thrombotic events and the genetic mutation (although heterozygous), once hemostasis achieved, recommend restarting anticoagulation.  If further debridements are being planned, and significant bleeding is expected depending on the location and the extent of the procedure, may give Lovenox 1 mg/kg BID, holding two doses prior to planned surgery or heparin drip, holding 6 hours prior to planned surgery.  Once no further surgeries are being planned, can restart Coumadin for goal INR of 2 to 3.  While Coumadin is being bridged, patient will require bridging with weight-based Lovenox or heparin for a minimum of 5 days. If heparin is used, CBC to be monitored the first 5 days.  However, from just limited skin debridement, the patient may not have any significant bleeding while on anticoagulation.

## 2019-01-04 NOTE — CONSULT NOTE ADULT - SUBJECTIVE AND OBJECTIVE BOX
Patient is a 73y old  Female who presents with a chief complaint of LLE wound and swelling (04 Jan 2019 12:41)      HPI:  73y female with pmh of Asthma, Autoimmune hepatitis treated with steroids,DVT, HTN, PE and DVT (on coumadin) who presented to ED for LLE wound with worsening pain, swelling and redness. Patient states her left leg is also slightly more swollen than her right, but she noticed on Sunday there was a small blood blister on her shin below her knee. Since then LLE has become more swollen, redness increased from surrounding the wound and spread to her leg and pain has worsened. Patient reports chills, but no fever. Patient denies SOB, chest pain, dysuria, nausea or vomiting. (02 Jan 2019 15:05)    Hematologic HPI: Patient was diagnosed with PE approximately two years prior to presentation and found to have prothrombin gene mutation.  In February 2017, she was admitted to the ICU with diffuse alveolar hemorrhage, and her coumadin was stopped.  While off coumadin, patient developed a L gastrocnemius DVT.  This was thought to be secondary to being       ROS: Patient denies any current complaints.    PAST MEDICAL & SURGICAL HISTORY:  Autoimmune hepatitis  Other chronic pulmonary embolism without acute cor pulmonale  Essential hypertension  Autoimmune hepatitis treated with steroids  Asthma  DVT (deep venous thrombosis)  S/P debridement  History of back surgery    SOCIAL HISTORY: non-smoker    FAMILY HISTORY:  No pertinent family history in first degree relatives      MEDICATIONS  (STANDING):  amLODIPine   Tablet 5 milliGRAM(s) Oral daily  ampicillin/sulbactam  IVPB 3 Gram(s) IV Intermittent every 6 hours  docusate sodium 100 milliGRAM(s) Oral three times a day  furosemide    Tablet 20 milliGRAM(s) Oral daily  gabapentin 100 milliGRAM(s) Oral every 8 hours  lactated ringers. 1000 milliLiter(s) (100 mL/Hr) IV Continuous <Continuous>  lactated ringers. 1000 milliLiter(s) (75 mL/Hr) IV Continuous <Continuous>  metoprolol succinate ER 25 milliGRAM(s) Oral daily  montelukast 10 milliGRAM(s) Oral at bedtime  predniSONE   Tablet 10 milliGRAM(s) Oral daily  senna 2 Tablet(s) Oral at bedtime  silver sulfADIAZINE 1% Cream 1 Application(s) Topical two times a day  tacrolimus 1 milliGRAM(s) Oral daily  tacrolimus 0.5 milliGRAM(s) Oral at bedtime  vancomycin  IVPB 1000 milliGRAM(s) IV Intermittent every 12 hours  warfarin 2 milliGRAM(s) Oral once    MEDICATIONS  (PRN):  acetaminophen   Tablet .. 650 milliGRAM(s) Oral every 6 hours PRN Temp greater or equal to 38.5C (101.3F), Mild Pain (1 - 3)  dexamethasone  IVPB 8 milliGRAM(s) IV Intermittent once PRN Nausea and/or Vomiting  HYDROmorphone  Injectable 0.5 milliGRAM(s) IV Push every 10 minutes PRN Moderate Pain (4 - 6)  HYDROmorphone  Injectable 1 milliGRAM(s) IV Push every 10 minutes PRN Severe Pain (7 - 10)  meperidine     Injectable 12.5 milliGRAM(s) IV Push every 10 minutes PRN Shivering  morphine  - Injectable 2 milliGRAM(s) IV Push every 6 hours PRN Severe Pain (7 - 10)  ondansetron Injectable 4 milliGRAM(s) IV Push once PRN Nausea and/or Vomiting  oxyCODONE    5 mG/acetaminophen 325 mG 1 Tablet(s) Oral every 6 hours PRN Moderate Pain (4 - 6)    Height (cm): 165.1 (01-03-19 @ 17:43)  Weight (kg): 77.1 (01-03-19 @ 17:43)  BMI (kg/m2): 28.3 (01-03-19 @ 17:43)  BSA (m2): 1.85 (01-03-19 @ 17:43)  Allergies    No Known Allergies    Intolerances        Vital Signs Last 24 Hrs  T(C): 36.4 (04 Jan 2019 12:15), Max: 36.7 (03 Jan 2019 22:10)  T(F): 97.5 (04 Jan 2019 12:15), Max: 98.1 (03 Jan 2019 22:10)  HR: 84 (04 Jan 2019 12:15) (80 - 106)  BP: 160/83 (04 Jan 2019 12:15) (120/74 - 170/80)  BP(mean): 130 (04 Jan 2019 12:15) (100 - 130)  RR: 21 (04 Jan 2019 12:15) (16 - 26)  SpO2: 97% (04 Jan 2019 12:15) (93% - 100%)    PHYSICAL EXAM  General: adult in NAD  HEENT: clear oropharynx, anicteric sclera, pink conjunctiva  Neck: supple  CV: normal S1/S2 with no murmur rubs or gallops  Lungs: positive air movement b/l ant lungs,clear to auscultation, no wheezes, no rales  Abdomen: soft non-tender non-distended, no hepatosplenomegaly  Ext: no clubbing cyanosis or edema  Skin: no rashes and no petechiae  Neuro: alert and oriented X 4, no focal deficits      LABS:                          11.9   11.81 )-----------( 272      ( 03 Jan 2019 19:39 )             37.8         Mean Cell Volume : 86.3 fL  Mean Cell Hemoglobin : 27.2 pg  Mean Cell Hemoglobin Concentration : 31.5 g/dL  Auto Neutrophil # : x  Auto Lymphocyte # : x  Auto Monocyte # : x  Auto Eosinophil # : x  Auto Basophil # : x  Auto Neutrophil % : x  Auto Lymphocyte % : x  Auto Monocyte % : x  Auto Eosinophil % : x  Auto Basophil % : x      Serial CBC's  01-03 @ 19:39  Hct-37.8 / Hgb-11.9 / Plat-272 / RBC-4.38 / WBC-11.81  Serial CBC's  01-02 @ 12:53  Hct-39.7 / Hgb-12.6 / Plat-275 / RBC-4.62 / WBC-16.31      01-03    146  |  103  |  17  ----------------------------<  170<H>  4.3   |  27  |  1.1    Ca    8.5      03 Jan 2019 19:39  Phos  3.1     01-03  Mg     1.8     01-03    TPro  6.1  /  Alb  3.2<L>  /  TBili  0.7  /  DBili  x   /  AST  32  /  ALT  50<H>  /  AlkPhos  157<H>  01-02      PT/INR - ( 03 Jan 2019 19:39 )   PT: 23.80 sec;   INR: 2.08 ratio         PTT - ( 03 Jan 2019 19:39 )  PTT:29.7 sec Patient is a 73y old  Female who presents with a chief complaint of LLE wound and swelling (04 Jan 2019 12:41)      HPI:  73y female with pmh of asthma, autoimmune hepatitis treated with steroids, HTN, PE and DVT (on coumadin) who presented to ED for LLE wound with worsening pain, swelling and redness.    Hematologic HPI: Patient was diagnosed with PE approximately two years prior to presentation and found to have prothrombin gene mutation.  In February 2017, she was admitted to the ICU with diffuse alveolar hemorrhage, and her coumadin was stopped.  While off coumadin, patient developed a L gastrocnemius DVT.  This was attributed to being off coumadin.  Repeat B/L LE venous duplex in August 2018 only showed chronic L gastrocnemius DVT.  Patient's coumadin was held secondary to a planned debridement of chronic LLE DVT by burn team, which was done on 1/4/2019.  Hematology consulted for recommendations regarding further anticoagulation, considering that additional procedures may be planned.     ROS: Patient denies any current complaints.  Has not had any further bleeding episodes since February 2017 hospitalization.    PAST MEDICAL & SURGICAL HISTORY:  Autoimmune hepatitis  Other chronic pulmonary embolism without acute cor pulmonale  Essential hypertension  Autoimmune hepatitis treated with steroids  Asthma  DVT (deep venous thrombosis)  S/P debridement  History of back surgery    SOCIAL HISTORY: non-smoker    FAMILY HISTORY:  No pertinent family history in first degree relatives      MEDICATIONS  (STANDING):  amLODIPine   Tablet 5 milliGRAM(s) Oral daily  ampicillin/sulbactam  IVPB 3 Gram(s) IV Intermittent every 6 hours  docusate sodium 100 milliGRAM(s) Oral three times a day  furosemide    Tablet 20 milliGRAM(s) Oral daily  gabapentin 100 milliGRAM(s) Oral every 8 hours  lactated ringers. 1000 milliLiter(s) (100 mL/Hr) IV Continuous <Continuous>  lactated ringers. 1000 milliLiter(s) (75 mL/Hr) IV Continuous <Continuous>  metoprolol succinate ER 25 milliGRAM(s) Oral daily  montelukast 10 milliGRAM(s) Oral at bedtime  predniSONE   Tablet 10 milliGRAM(s) Oral daily  senna 2 Tablet(s) Oral at bedtime  silver sulfADIAZINE 1% Cream 1 Application(s) Topical two times a day  tacrolimus 1 milliGRAM(s) Oral daily  tacrolimus 0.5 milliGRAM(s) Oral at bedtime  vancomycin  IVPB 1000 milliGRAM(s) IV Intermittent every 12 hours  warfarin 2 milliGRAM(s) Oral once    MEDICATIONS  (PRN):  acetaminophen   Tablet .. 650 milliGRAM(s) Oral every 6 hours PRN Temp greater or equal to 38.5C (101.3F), Mild Pain (1 - 3)  dexamethasone  IVPB 8 milliGRAM(s) IV Intermittent once PRN Nausea and/or Vomiting  HYDROmorphone  Injectable 0.5 milliGRAM(s) IV Push every 10 minutes PRN Moderate Pain (4 - 6)  HYDROmorphone  Injectable 1 milliGRAM(s) IV Push every 10 minutes PRN Severe Pain (7 - 10)  meperidine     Injectable 12.5 milliGRAM(s) IV Push every 10 minutes PRN Shivering  morphine  - Injectable 2 milliGRAM(s) IV Push every 6 hours PRN Severe Pain (7 - 10)  ondansetron Injectable 4 milliGRAM(s) IV Push once PRN Nausea and/or Vomiting  oxyCODONE    5 mG/acetaminophen 325 mG 1 Tablet(s) Oral every 6 hours PRN Moderate Pain (4 - 6)    Height (cm): 165.1 (01-03-19 @ 17:43)  Weight (kg): 77.1 (01-03-19 @ 17:43)  BMI (kg/m2): 28.3 (01-03-19 @ 17:43)  BSA (m2): 1.85 (01-03-19 @ 17:43)  Allergies    No Known Allergies    Intolerances      Vital Signs Last 24 Hrs  T(C): 36.4 (04 Jan 2019 12:15), Max: 36.7 (03 Jan 2019 22:10)  T(F): 97.5 (04 Jan 2019 12:15), Max: 98.1 (03 Jan 2019 22:10)  HR: 84 (04 Jan 2019 12:15) (80 - 106)  BP: 160/83 (04 Jan 2019 12:15) (120/74 - 170/80)  BP(mean): 130 (04 Jan 2019 12:15) (100 - 130)  RR: 21 (04 Jan 2019 12:15) (16 - 26)  SpO2: 97% (04 Jan 2019 12:15) (93% - 100%)    PHYSICAL EXAM  General: adult in NAD, resting comfortably in bed  HEENT: NC/AT  Neck: supple  Lungs: positive air movement b/l ant lungs  Ext: no edema, LLE wound bandaged  Skin: no rashes and no petechiae  Neuro: alert and oriented X 4, no focal deficits      LABS:                          11.9   11.81 )-----------( 272      ( 03 Jan 2019 19:39 )             37.8         Mean Cell Volume : 86.3 fL  Mean Cell Hemoglobin : 27.2 pg  Mean Cell Hemoglobin Concentration : 31.5 g/dL  Auto Neutrophil # : x  Auto Lymphocyte # : x  Auto Monocyte # : x  Auto Eosinophil # : x  Auto Basophil # : x  Auto Neutrophil % : x  Auto Lymphocyte % : x  Auto Monocyte % : x  Auto Eosinophil % : x  Auto Basophil % : x      Serial CBC's  01-03 @ 19:39  Hct-37.8 / Hgb-11.9 / Plat-272 / RBC-4.38 / WBC-11.81  Serial CBC's  01-02 @ 12:53  Hct-39.7 / Hgb-12.6 / Plat-275 / RBC-4.62 / WBC-16.31      01-03    146  |  103  |  17  ----------------------------<  170<H>  4.3   |  27  |  1.1    Ca    8.5      03 Jan 2019 19:39  Phos  3.1     01-03  Mg     1.8     01-03    TPro  6.1  /  Alb  3.2<L>  /  TBili  0.7  /  DBili  x   /  AST  32  /  ALT  50<H>  /  AlkPhos  157<H>  01-02      PT/INR - ( 03 Jan 2019 19:39 )   PT: 23.80 sec;   INR: 2.08 ratio         PTT - ( 03 Jan 2019 19:39 )  PTT:29.7 sec Patient is a 73y old  Female who presents with a chief complaint of LLE wound and swelling (04 Jan 2019 12:41)      HPI:  73y female with pmh of asthma, autoimmune hepatitis treated with steroids, HTN, PE and DVT (on Coumadin) who presented to ED for LLE wound with worsening pain, swelling and redness.    Hematologic HPI: Patient was diagnosed with PE approximately two years prior to presentation and found to have heterozygous prothrombin gene mutation.  In February 2017, she was admitted to the ICU with diffuse alveolar hemorrhage, and her coumadin was stopped.  While off coumadin, patient developed a L gastrocnemius DVT.  This was attributed to being off coumadin.  Repeat B/L LE venous duplex in August 2018 only showed chronic L gastrocnemius DVT.  Patient's coumadin was held secondary to a planned debridement of chronic LLE DVT by burn team, which was done on 1/4/2019.  Hematology consulted for recommendations regarding further anticoagulation, considering that additional procedures may be planned.     ROS: Patient denies any current complaints.  Has not had any further bleeding episodes since February 2017 hospitalization.    PAST MEDICAL & SURGICAL HISTORY:  Autoimmune hepatitis  Other chronic pulmonary embolism without acute cor pulmonale  Essential hypertension  Autoimmune hepatitis treated with steroids  Asthma  DVT (deep venous thrombosis)  S/P debridement  History of back surgery    SOCIAL HISTORY: non-smoker    FAMILY HISTORY:  No pertinent family history in first degree relatives      MEDICATIONS  (STANDING):  amLODIPine   Tablet 5 milliGRAM(s) Oral daily  ampicillin/sulbactam  IVPB 3 Gram(s) IV Intermittent every 6 hours  docusate sodium 100 milliGRAM(s) Oral three times a day  furosemide    Tablet 20 milliGRAM(s) Oral daily  gabapentin 100 milliGRAM(s) Oral every 8 hours  lactated ringers. 1000 milliLiter(s) (100 mL/Hr) IV Continuous <Continuous>  lactated ringers. 1000 milliLiter(s) (75 mL/Hr) IV Continuous <Continuous>  metoprolol succinate ER 25 milliGRAM(s) Oral daily  montelukast 10 milliGRAM(s) Oral at bedtime  predniSONE   Tablet 10 milliGRAM(s) Oral daily  senna 2 Tablet(s) Oral at bedtime  silver sulfADIAZINE 1% Cream 1 Application(s) Topical two times a day  tacrolimus 1 milliGRAM(s) Oral daily  tacrolimus 0.5 milliGRAM(s) Oral at bedtime  vancomycin  IVPB 1000 milliGRAM(s) IV Intermittent every 12 hours  warfarin 2 milliGRAM(s) Oral once    MEDICATIONS  (PRN):  acetaminophen   Tablet .. 650 milliGRAM(s) Oral every 6 hours PRN Temp greater or equal to 38.5C (101.3F), Mild Pain (1 - 3)  dexamethasone  IVPB 8 milliGRAM(s) IV Intermittent once PRN Nausea and/or Vomiting  HYDROmorphone  Injectable 0.5 milliGRAM(s) IV Push every 10 minutes PRN Moderate Pain (4 - 6)  HYDROmorphone  Injectable 1 milliGRAM(s) IV Push every 10 minutes PRN Severe Pain (7 - 10)  meperidine     Injectable 12.5 milliGRAM(s) IV Push every 10 minutes PRN Shivering  morphine  - Injectable 2 milliGRAM(s) IV Push every 6 hours PRN Severe Pain (7 - 10)  ondansetron Injectable 4 milliGRAM(s) IV Push once PRN Nausea and/or Vomiting  oxyCODONE    5 mG/acetaminophen 325 mG 1 Tablet(s) Oral every 6 hours PRN Moderate Pain (4 - 6)    Height (cm): 165.1 (01-03-19 @ 17:43)  Weight (kg): 77.1 (01-03-19 @ 17:43)  BMI (kg/m2): 28.3 (01-03-19 @ 17:43)  BSA (m2): 1.85 (01-03-19 @ 17:43)  Allergies    No Known Allergies    Intolerances      Vital Signs Last 24 Hrs  T(C): 36.4 (04 Jan 2019 12:15), Max: 36.7 (03 Jan 2019 22:10)  T(F): 97.5 (04 Jan 2019 12:15), Max: 98.1 (03 Jan 2019 22:10)  HR: 84 (04 Jan 2019 12:15) (80 - 106)  BP: 160/83 (04 Jan 2019 12:15) (120/74 - 170/80)  BP(mean): 130 (04 Jan 2019 12:15) (100 - 130)  RR: 21 (04 Jan 2019 12:15) (16 - 26)  SpO2: 97% (04 Jan 2019 12:15) (93% - 100%)    PHYSICAL EXAM  General: adult in NAD, resting comfortably in bed  HEENT: NC/AT  Neck: supple  Lungs: positive air movement b/l ant lungs  Ext: no edema, LLE wound bandaged  Skin: no rashes and no petechiae  Neuro: alert and oriented X 4, no focal deficits      LABS:                          11.9   11.81 )-----------( 272      ( 03 Jan 2019 19:39 )             37.8         Mean Cell Volume : 86.3 fL  Mean Cell Hemoglobin : 27.2 pg  Mean Cell Hemoglobin Concentration : 31.5 g/dL  Auto Neutrophil # : x  Auto Lymphocyte # : x  Auto Monocyte # : x  Auto Eosinophil # : x  Auto Basophil # : x  Auto Neutrophil % : x  Auto Lymphocyte % : x  Auto Monocyte % : x  Auto Eosinophil % : x  Auto Basophil % : x      Serial CBC's  01-03 @ 19:39  Hct-37.8 / Hgb-11.9 / Plat-272 / RBC-4.38 / WBC-11.81  Serial CBC's  01-02 @ 12:53  Hct-39.7 / Hgb-12.6 / Plat-275 / RBC-4.62 / WBC-16.31      01-03    146  |  103  |  17  ----------------------------<  170<H>  4.3   |  27  |  1.1    Ca    8.5      03 Jan 2019 19:39  Phos  3.1     01-03  Mg     1.8     01-03    TPro  6.1  /  Alb  3.2<L>  /  TBili  0.7  /  DBili  x   /  AST  32  /  ALT  50<H>  /  AlkPhos  157<H>  01-02      PT/INR - ( 03 Jan 2019 19:39 )   PT: 23.80 sec;   INR: 2.08 ratio         PTT - ( 03 Jan 2019 19:39 )  PTT:29.7 sec Patient is a 73y old  Female who presents with a chief complaint of LLE wound and swelling (04 Jan 2019 12:41)      HPI:  73y female with pmh of asthma, autoimmune hepatitis treated with steroids, HTN, PE and DVT (on Coumadin) who presented to ED for LLE wound with worsening pain, swelling and redness.    Hematologic HPI: Patient was diagnosed with PE approximately two years prior to presentation and found to have heterozygous prothrombin gene mutation.  In February 2017, she was admitted to the ICU with diffuse alveolar hemorrhage, and her coumadin was stopped.  While off coumadin, patient developed a L gastrocnemius DVT.  This was attributed to being off coumadin.  Repeat B/L LE venous duplex in August 2018 only showed chronic L gastrocnemius DVT.  Patient's coumadin was held secondary to a planned debridement of chronic LLE wound by burn team, which was done on 1/4/2019.  Hematology consulted for recommendations regarding further anticoagulation, considering that additional procedures may be planned.     ROS: Patient denies any current complaints.  Has not had any further bleeding episodes since February 2017 hospitalization.    PAST MEDICAL & SURGICAL HISTORY:  Autoimmune hepatitis  Other chronic pulmonary embolism without acute cor pulmonale  Essential hypertension  Autoimmune hepatitis treated with steroids  Asthma  DVT (deep venous thrombosis)  S/P debridement  History of back surgery    SOCIAL HISTORY: non-smoker    FAMILY HISTORY:  No pertinent family history in first degree relatives      MEDICATIONS  (STANDING):  amLODIPine   Tablet 5 milliGRAM(s) Oral daily  ampicillin/sulbactam  IVPB 3 Gram(s) IV Intermittent every 6 hours  docusate sodium 100 milliGRAM(s) Oral three times a day  furosemide    Tablet 20 milliGRAM(s) Oral daily  gabapentin 100 milliGRAM(s) Oral every 8 hours  lactated ringers. 1000 milliLiter(s) (100 mL/Hr) IV Continuous <Continuous>  lactated ringers. 1000 milliLiter(s) (75 mL/Hr) IV Continuous <Continuous>  metoprolol succinate ER 25 milliGRAM(s) Oral daily  montelukast 10 milliGRAM(s) Oral at bedtime  predniSONE   Tablet 10 milliGRAM(s) Oral daily  senna 2 Tablet(s) Oral at bedtime  silver sulfADIAZINE 1% Cream 1 Application(s) Topical two times a day  tacrolimus 1 milliGRAM(s) Oral daily  tacrolimus 0.5 milliGRAM(s) Oral at bedtime  vancomycin  IVPB 1000 milliGRAM(s) IV Intermittent every 12 hours  warfarin 2 milliGRAM(s) Oral once    MEDICATIONS  (PRN):  acetaminophen   Tablet .. 650 milliGRAM(s) Oral every 6 hours PRN Temp greater or equal to 38.5C (101.3F), Mild Pain (1 - 3)  dexamethasone  IVPB 8 milliGRAM(s) IV Intermittent once PRN Nausea and/or Vomiting  HYDROmorphone  Injectable 0.5 milliGRAM(s) IV Push every 10 minutes PRN Moderate Pain (4 - 6)  HYDROmorphone  Injectable 1 milliGRAM(s) IV Push every 10 minutes PRN Severe Pain (7 - 10)  meperidine     Injectable 12.5 milliGRAM(s) IV Push every 10 minutes PRN Shivering  morphine  - Injectable 2 milliGRAM(s) IV Push every 6 hours PRN Severe Pain (7 - 10)  ondansetron Injectable 4 milliGRAM(s) IV Push once PRN Nausea and/or Vomiting  oxyCODONE    5 mG/acetaminophen 325 mG 1 Tablet(s) Oral every 6 hours PRN Moderate Pain (4 - 6)    Height (cm): 165.1 (01-03-19 @ 17:43)  Weight (kg): 77.1 (01-03-19 @ 17:43)  BMI (kg/m2): 28.3 (01-03-19 @ 17:43)  BSA (m2): 1.85 (01-03-19 @ 17:43)  Allergies    No Known Allergies    Intolerances      Vital Signs Last 24 Hrs  T(C): 36.4 (04 Jan 2019 12:15), Max: 36.7 (03 Jan 2019 22:10)  T(F): 97.5 (04 Jan 2019 12:15), Max: 98.1 (03 Jan 2019 22:10)  HR: 84 (04 Jan 2019 12:15) (80 - 106)  BP: 160/83 (04 Jan 2019 12:15) (120/74 - 170/80)  BP(mean): 130 (04 Jan 2019 12:15) (100 - 130)  RR: 21 (04 Jan 2019 12:15) (16 - 26)  SpO2: 97% (04 Jan 2019 12:15) (93% - 100%)    PHYSICAL EXAM  General: adult in NAD, resting comfortably in bed  HEENT: NC/AT  Neck: supple  Lungs: positive air movement b/l ant lungs  Ext: no edema, LLE wound bandaged  Skin: no rashes and no petechiae  Neuro: alert and oriented X 4, no focal deficits      LABS:                          11.9   11.81 )-----------( 272      ( 03 Jan 2019 19:39 )             37.8         Mean Cell Volume : 86.3 fL  Mean Cell Hemoglobin : 27.2 pg  Mean Cell Hemoglobin Concentration : 31.5 g/dL  Auto Neutrophil # : x  Auto Lymphocyte # : x  Auto Monocyte # : x  Auto Eosinophil # : x  Auto Basophil # : x  Auto Neutrophil % : x  Auto Lymphocyte % : x  Auto Monocyte % : x  Auto Eosinophil % : x  Auto Basophil % : x      Serial CBC's  01-03 @ 19:39  Hct-37.8 / Hgb-11.9 / Plat-272 / RBC-4.38 / WBC-11.81  Serial CBC's  01-02 @ 12:53  Hct-39.7 / Hgb-12.6 / Plat-275 / RBC-4.62 / WBC-16.31      01-03    146  |  103  |  17  ----------------------------<  170<H>  4.3   |  27  |  1.1    Ca    8.5      03 Jan 2019 19:39  Phos  3.1     01-03  Mg     1.8     01-03    TPro  6.1  /  Alb  3.2<L>  /  TBili  0.7  /  DBili  x   /  AST  32  /  ALT  50<H>  /  AlkPhos  157<H>  01-02      PT/INR - ( 03 Jan 2019 19:39 )   PT: 23.80 sec;   INR: 2.08 ratio         PTT - ( 03 Jan 2019 19:39 )  PTT:29.7 sec

## 2019-01-05 LAB
ANION GAP SERPL CALC-SCNC: 17 MMOL/L — HIGH (ref 7–14)
APTT BLD: 28.3 SEC — SIGNIFICANT CHANGE UP (ref 27–39.2)
APTT BLD: 33.3 SEC — SIGNIFICANT CHANGE UP (ref 27–39.2)
BUN SERPL-MCNC: 21 MG/DL — HIGH (ref 10–20)
CALCIUM SERPL-MCNC: 8.4 MG/DL — LOW (ref 8.5–10.1)
CHLORIDE SERPL-SCNC: 105 MMOL/L — SIGNIFICANT CHANGE UP (ref 98–110)
CO2 SERPL-SCNC: 24 MMOL/L — SIGNIFICANT CHANGE UP (ref 17–32)
CREAT SERPL-MCNC: 1.2 MG/DL — SIGNIFICANT CHANGE UP (ref 0.7–1.5)
GLUCOSE SERPL-MCNC: 139 MG/DL — HIGH (ref 70–99)
HCT VFR BLD CALC: 39.5 % — SIGNIFICANT CHANGE UP (ref 37–47)
HGB BLD-MCNC: 12.3 G/DL — SIGNIFICANT CHANGE UP (ref 12–16)
INR BLD: 1.58 RATIO — HIGH (ref 0.65–1.3)
INR BLD: 1.6 RATIO — HIGH (ref 0.65–1.3)
MAGNESIUM SERPL-MCNC: 1.8 MG/DL — SIGNIFICANT CHANGE UP (ref 1.8–2.4)
MCHC RBC-ENTMCNC: 26.7 PG — LOW (ref 27–31)
MCHC RBC-ENTMCNC: 31.1 G/DL — LOW (ref 32–37)
MCV RBC AUTO: 85.9 FL — SIGNIFICANT CHANGE UP (ref 81–99)
NRBC # BLD: 0 /100 WBCS — SIGNIFICANT CHANGE UP (ref 0–0)
PHOSPHATE SERPL-MCNC: 3.1 MG/DL — SIGNIFICANT CHANGE UP (ref 2.1–4.9)
PLATELET # BLD AUTO: 304 K/UL — SIGNIFICANT CHANGE UP (ref 130–400)
POTASSIUM SERPL-MCNC: 3.8 MMOL/L — SIGNIFICANT CHANGE UP (ref 3.5–5)
POTASSIUM SERPL-SCNC: 3.8 MMOL/L — SIGNIFICANT CHANGE UP (ref 3.5–5)
PROTHROM AB SERPL-ACNC: 18.1 SEC — HIGH (ref 9.95–12.87)
PROTHROM AB SERPL-ACNC: 18.3 SEC — HIGH (ref 9.95–12.87)
RBC # BLD: 4.6 M/UL — SIGNIFICANT CHANGE UP (ref 4.2–5.4)
RBC # FLD: 15.6 % — HIGH (ref 11.5–14.5)
SODIUM SERPL-SCNC: 146 MMOL/L — SIGNIFICANT CHANGE UP (ref 135–146)
VANCOMYCIN TROUGH SERPL-MCNC: 38.7 UG/ML — HIGH (ref 5–10)
WBC # BLD: 11.25 K/UL — HIGH (ref 4.8–10.8)
WBC # FLD AUTO: 11.25 K/UL — HIGH (ref 4.8–10.8)

## 2019-01-05 RX ORDER — ENOXAPARIN SODIUM 100 MG/ML
77 INJECTION SUBCUTANEOUS EVERY 12 HOURS
Qty: 0 | Refills: 0 | Status: DISCONTINUED | OUTPATIENT
Start: 2019-01-05 | End: 2019-01-05

## 2019-01-05 RX ORDER — ENOXAPARIN SODIUM 100 MG/ML
80 INJECTION SUBCUTANEOUS EVERY 12 HOURS
Qty: 0 | Refills: 0 | Status: DISCONTINUED | OUTPATIENT
Start: 2019-01-05 | End: 2019-01-07

## 2019-01-05 RX ADMIN — Medication 20 MILLIGRAM(S): at 05:42

## 2019-01-05 RX ADMIN — AMLODIPINE BESYLATE 5 MILLIGRAM(S): 2.5 TABLET ORAL at 05:42

## 2019-01-05 RX ADMIN — Medication 100 MILLIGRAM(S): at 14:18

## 2019-01-05 RX ADMIN — Medication 250 MILLIGRAM(S): at 05:42

## 2019-01-05 RX ADMIN — GABAPENTIN 100 MILLIGRAM(S): 400 CAPSULE ORAL at 05:42

## 2019-01-05 RX ADMIN — AMPICILLIN SODIUM AND SULBACTAM SODIUM 200 GRAM(S): 250; 125 INJECTION, POWDER, FOR SUSPENSION INTRAMUSCULAR; INTRAVENOUS at 05:42

## 2019-01-05 RX ADMIN — GABAPENTIN 100 MILLIGRAM(S): 400 CAPSULE ORAL at 14:18

## 2019-01-05 RX ADMIN — TACROLIMUS 0.5 MILLIGRAM(S): 5 CAPSULE ORAL at 21:44

## 2019-01-05 RX ADMIN — GABAPENTIN 100 MILLIGRAM(S): 400 CAPSULE ORAL at 21:44

## 2019-01-05 RX ADMIN — TACROLIMUS 1 MILLIGRAM(S): 5 CAPSULE ORAL at 12:02

## 2019-01-05 RX ADMIN — Medication 250 MILLIGRAM(S): at 17:17

## 2019-01-05 RX ADMIN — AMPICILLIN SODIUM AND SULBACTAM SODIUM 200 GRAM(S): 250; 125 INJECTION, POWDER, FOR SUSPENSION INTRAMUSCULAR; INTRAVENOUS at 17:01

## 2019-01-05 RX ADMIN — Medication 25 MILLIGRAM(S): at 05:42

## 2019-01-05 RX ADMIN — Medication 3 MILLIGRAM(S): at 14:21

## 2019-01-05 RX ADMIN — Medication 10 MILLIGRAM(S): at 05:42

## 2019-01-05 RX ADMIN — MONTELUKAST 10 MILLIGRAM(S): 4 TABLET, CHEWABLE ORAL at 21:44

## 2019-01-05 RX ADMIN — Medication 3 MILLIGRAM(S): at 05:43

## 2019-01-05 RX ADMIN — ENOXAPARIN SODIUM 80 MILLIGRAM(S): 100 INJECTION SUBCUTANEOUS at 17:00

## 2019-01-05 RX ADMIN — AMPICILLIN SODIUM AND SULBACTAM SODIUM 200 GRAM(S): 250; 125 INJECTION, POWDER, FOR SUSPENSION INTRAMUSCULAR; INTRAVENOUS at 12:01

## 2019-01-05 NOTE — PROGRESS NOTE ADULT - SUBJECTIVE AND OBJECTIVE BOX
POD#1    Vital Signs Last 24 Hrs  T(C): 36 (05 Jan 2019 05:01), Max: 36.1 (04 Jan 2019 16:29)  T(F): 96.8 (05 Jan 2019 05:01), Max: 97 (04 Jan 2019 16:29)  HR: 74 (05 Jan 2019 07:02) (74 - 82)  BP: 175/81 (05 Jan 2019 07:02) (150/72 - 175/81)  BP(mean): --  RR: 19 (05 Jan 2019 05:01) (18 - 19)  SpO2: 95% (05 Jan 2019 08:20) (95% - 95%)    CVP:  T(C): 36 (01-05-19 @ 05:01), Max: 36.1 (01-04-19 @ 16:29)  HR: 74 (01-05-19 @ 07:02) (74 - 82)  BP: 175/81 (01-05-19 @ 07:02) (150/72 - 175/81)  RR: 19 (01-05-19 @ 05:01) (18 - 19)  SpO2: 95% (01-05-19 @ 08:20) (95% - 95%)  CVP(mm Hg): --    U.O.:  I&O's Detail    04 Jan 2019 07:01  -  05 Jan 2019 07:00  --------------------------------------------------------  IN:    lactated ringers.: 75 mL  Total IN: 75 mL    OUT:  Total OUT: 0 mL    Total NET: 75 mL                                        12.3   11.56 )-----------( 297      ( 04 Jan 2019 18:31 )             38.9     01-04    146  |  102  |  13  ----------------------------<  139<H>  3.3<L>   |  26  |  1.0    Ca    8.5      04 Jan 2019 18:31  Phos  3.4     01-04  Mg     1.6     01-04        Large Dressing Change--> open wound with viable tissue left anterior lower leg with decreased edema and erythema

## 2019-01-06 LAB
-  AMPICILLIN/SULBACTAM: SIGNIFICANT CHANGE UP
-  CEFAZOLIN: SIGNIFICANT CHANGE UP
-  CLINDAMYCIN: SIGNIFICANT CHANGE UP
-  DAPTOMYCIN: SIGNIFICANT CHANGE UP
-  ERYTHROMYCIN: SIGNIFICANT CHANGE UP
-  GENTAMICIN: SIGNIFICANT CHANGE UP
-  LINEZOLID: SIGNIFICANT CHANGE UP
-  OXACILLIN: SIGNIFICANT CHANGE UP
-  PENICILLIN: SIGNIFICANT CHANGE UP
-  RIFAMPIN: SIGNIFICANT CHANGE UP
-  TETRACYCLINE: SIGNIFICANT CHANGE UP
-  TRIMETHOPRIM/SULFAMETHOXAZOLE: SIGNIFICANT CHANGE UP
-  VANCOMYCIN: SIGNIFICANT CHANGE UP
ANION GAP SERPL CALC-SCNC: 18 MMOL/L — HIGH (ref 7–14)
APTT BLD: 34.8 SEC — SIGNIFICANT CHANGE UP (ref 27–39.2)
APTT BLD: 39 SEC — SIGNIFICANT CHANGE UP (ref 27–39.2)
BUN SERPL-MCNC: 16 MG/DL — SIGNIFICANT CHANGE UP (ref 10–20)
C DIFF BY PCR RESULT: NEGATIVE — SIGNIFICANT CHANGE UP
C DIFF TOX GENS STL QL NAA+PROBE: SIGNIFICANT CHANGE UP
CALCIUM SERPL-MCNC: 8.8 MG/DL — SIGNIFICANT CHANGE UP (ref 8.5–10.1)
CHLORIDE SERPL-SCNC: 103 MMOL/L — SIGNIFICANT CHANGE UP (ref 98–110)
CO2 SERPL-SCNC: 25 MMOL/L — SIGNIFICANT CHANGE UP (ref 17–32)
CREAT SERPL-MCNC: 1.1 MG/DL — SIGNIFICANT CHANGE UP (ref 0.7–1.5)
GLUCOSE SERPL-MCNC: 157 MG/DL — HIGH (ref 70–99)
HCT VFR BLD CALC: 41.3 % — SIGNIFICANT CHANGE UP (ref 37–47)
HGB BLD-MCNC: 13 G/DL — SIGNIFICANT CHANGE UP (ref 12–16)
INR BLD: 1.56 RATIO — HIGH (ref 0.65–1.3)
INR BLD: 1.63 RATIO — HIGH (ref 0.65–1.3)
MAGNESIUM SERPL-MCNC: 1.7 MG/DL — LOW (ref 1.8–2.4)
MCHC RBC-ENTMCNC: 26.7 PG — LOW (ref 27–31)
MCHC RBC-ENTMCNC: 31.5 G/DL — LOW (ref 32–37)
MCV RBC AUTO: 85 FL — SIGNIFICANT CHANGE UP (ref 81–99)
METHOD TYPE: SIGNIFICANT CHANGE UP
NRBC # BLD: 0 /100 WBCS — SIGNIFICANT CHANGE UP (ref 0–0)
PHOSPHATE SERPL-MCNC: 3.3 MG/DL — SIGNIFICANT CHANGE UP (ref 2.1–4.9)
PLATELET # BLD AUTO: 314 K/UL — SIGNIFICANT CHANGE UP (ref 130–400)
POTASSIUM SERPL-MCNC: 3.2 MMOL/L — LOW (ref 3.5–5)
POTASSIUM SERPL-SCNC: 3.2 MMOL/L — LOW (ref 3.5–5)
PROTHROM AB SERPL-ACNC: 17.9 SEC — HIGH (ref 9.95–12.87)
PROTHROM AB SERPL-ACNC: 18.6 SEC — HIGH (ref 9.95–12.87)
RBC # BLD: 4.86 M/UL — SIGNIFICANT CHANGE UP (ref 4.2–5.4)
RBC # FLD: 15.3 % — HIGH (ref 11.5–14.5)
SODIUM SERPL-SCNC: 146 MMOL/L — SIGNIFICANT CHANGE UP (ref 135–146)
VANCOMYCIN TROUGH SERPL-MCNC: 13 UG/ML — HIGH (ref 5–10)
WBC # BLD: 11.6 K/UL — HIGH (ref 4.8–10.8)
WBC # FLD AUTO: 11.6 K/UL — HIGH (ref 4.8–10.8)

## 2019-01-06 RX ORDER — WARFARIN SODIUM 2.5 MG/1
2 TABLET ORAL ONCE
Qty: 0 | Refills: 0 | Status: COMPLETED | OUTPATIENT
Start: 2019-01-06 | End: 2019-01-06

## 2019-01-06 RX ORDER — POTASSIUM CHLORIDE 20 MEQ
20 PACKET (EA) ORAL ONCE
Qty: 0 | Refills: 0 | Status: DISCONTINUED | OUTPATIENT
Start: 2019-01-06 | End: 2019-01-07

## 2019-01-06 RX ORDER — SODIUM CHLORIDE 9 MG/ML
1000 INJECTION, SOLUTION INTRAVENOUS
Qty: 0 | Refills: 0 | Status: DISCONTINUED | OUTPATIENT
Start: 2019-01-06 | End: 2019-01-07

## 2019-01-06 RX ORDER — MAGNESIUM SULFATE 500 MG/ML
2 VIAL (ML) INJECTION ONCE
Qty: 0 | Refills: 0 | Status: COMPLETED | OUTPATIENT
Start: 2019-01-06 | End: 2019-01-07

## 2019-01-06 RX ORDER — POTASSIUM CHLORIDE 20 MEQ
40 PACKET (EA) ORAL ONCE
Qty: 0 | Refills: 0 | Status: COMPLETED | OUTPATIENT
Start: 2019-01-06 | End: 2019-01-07

## 2019-01-06 RX ADMIN — GABAPENTIN 100 MILLIGRAM(S): 400 CAPSULE ORAL at 05:28

## 2019-01-06 RX ADMIN — Medication 100 MILLIGRAM(S): at 05:28

## 2019-01-06 RX ADMIN — TACROLIMUS 1 MILLIGRAM(S): 5 CAPSULE ORAL at 11:17

## 2019-01-06 RX ADMIN — GABAPENTIN 100 MILLIGRAM(S): 400 CAPSULE ORAL at 13:21

## 2019-01-06 RX ADMIN — Medication 3 MILLIGRAM(S): at 00:45

## 2019-01-06 RX ADMIN — TACROLIMUS 0.5 MILLIGRAM(S): 5 CAPSULE ORAL at 23:12

## 2019-01-06 RX ADMIN — AMPICILLIN SODIUM AND SULBACTAM SODIUM 200 GRAM(S): 250; 125 INJECTION, POWDER, FOR SUSPENSION INTRAMUSCULAR; INTRAVENOUS at 05:30

## 2019-01-06 RX ADMIN — Medication 650 MILLIGRAM(S): at 18:06

## 2019-01-06 RX ADMIN — WARFARIN SODIUM 2 MILLIGRAM(S): 2.5 TABLET ORAL at 00:45

## 2019-01-06 RX ADMIN — Medication 20 MILLIGRAM(S): at 05:28

## 2019-01-06 RX ADMIN — GABAPENTIN 100 MILLIGRAM(S): 400 CAPSULE ORAL at 23:04

## 2019-01-06 RX ADMIN — Medication 3 MILLIGRAM(S): at 05:29

## 2019-01-06 RX ADMIN — Medication 3 MILLIGRAM(S): at 23:08

## 2019-01-06 RX ADMIN — AMPICILLIN SODIUM AND SULBACTAM SODIUM 200 GRAM(S): 250; 125 INJECTION, POWDER, FOR SUSPENSION INTRAMUSCULAR; INTRAVENOUS at 01:00

## 2019-01-06 RX ADMIN — Medication 10 MILLIGRAM(S): at 05:28

## 2019-01-06 RX ADMIN — Medication 25 MILLIGRAM(S): at 05:28

## 2019-01-06 RX ADMIN — AMLODIPINE BESYLATE 5 MILLIGRAM(S): 2.5 TABLET ORAL at 05:28

## 2019-01-06 RX ADMIN — WARFARIN SODIUM 2 MILLIGRAM(S): 2.5 TABLET ORAL at 23:05

## 2019-01-06 RX ADMIN — ENOXAPARIN SODIUM 80 MILLIGRAM(S): 100 INJECTION SUBCUTANEOUS at 05:30

## 2019-01-06 RX ADMIN — Medication 650 MILLIGRAM(S): at 18:43

## 2019-01-06 RX ADMIN — AMPICILLIN SODIUM AND SULBACTAM SODIUM 200 GRAM(S): 250; 125 INJECTION, POWDER, FOR SUSPENSION INTRAMUSCULAR; INTRAVENOUS at 11:16

## 2019-01-06 RX ADMIN — ENOXAPARIN SODIUM 80 MILLIGRAM(S): 100 INJECTION SUBCUTANEOUS at 17:22

## 2019-01-06 RX ADMIN — MONTELUKAST 10 MILLIGRAM(S): 4 TABLET, CHEWABLE ORAL at 23:04

## 2019-01-06 RX ADMIN — SODIUM CHLORIDE 50 MILLILITER(S): 9 INJECTION, SOLUTION INTRAVENOUS at 17:22

## 2019-01-06 NOTE — PROGRESS NOTE ADULT - SUBJECTIVE AND OBJECTIVE BOX
c/o diarrhea--> c. diff history    Vital Signs Last 24 Hrs  T(C): 35.9 (06 Jan 2019 05:31), Max: 35.9 (06 Jan 2019 05:31)  T(F): 96.6 (06 Jan 2019 05:31), Max: 96.6 (06 Jan 2019 05:31)  HR: 88 (06 Jan 2019 13:19) (80 - 88)  BP: 152/68 (06 Jan 2019 13:19) (152/68 - 182/98)  BP(mean): --  RR: --  SpO2: 94% (06 Jan 2019 08:28) (94% - 96%)    CVP:  T(C): 35.9 (01-06-19 @ 05:31), Max: 35.9 (01-06-19 @ 05:31)  HR: 88 (01-06-19 @ 13:19) (80 - 88)  BP: 152/68 (01-06-19 @ 13:19) (152/68 - 182/98)  RR: --  SpO2: 94% (01-06-19 @ 08:28) (94% - 96%)  CVP(mm Hg): --    U.O.:  I&O's Detail                                    12.3   11.25 )-----------( 304      ( 05 Jan 2019 17:43 )             39.5     01-05    146  |  105  |  21<H>  ----------------------------<  139<H>  3.8   |  24  |  1.2    Ca    8.4<L>      05 Jan 2019 17:43  Phos  3.1     01-05  Mg     1.8     01-05        Large Dressing Change--> left leg--> healing with decreased infection

## 2019-01-07 VITALS
DIASTOLIC BLOOD PRESSURE: 80 MMHG | SYSTOLIC BLOOD PRESSURE: 120 MMHG | TEMPERATURE: 96 F | HEART RATE: 84 BPM | RESPIRATION RATE: 20 BRPM

## 2019-01-07 LAB — SURGICAL PATHOLOGY STUDY: SIGNIFICANT CHANGE UP

## 2019-01-07 RX ORDER — MONTELUKAST 4 MG/1
1 TABLET, CHEWABLE ORAL
Qty: 0 | Refills: 0 | DISCHARGE
Start: 2019-01-07

## 2019-01-07 RX ORDER — DOCUSATE SODIUM 100 MG
1 CAPSULE ORAL
Qty: 21 | Refills: 0 | OUTPATIENT
Start: 2019-01-07 | End: 2019-01-13

## 2019-01-07 RX ORDER — GABAPENTIN 400 MG/1
1 CAPSULE ORAL
Qty: 21 | Refills: 0 | OUTPATIENT
Start: 2019-01-07 | End: 2019-01-13

## 2019-01-07 RX ORDER — SENNA PLUS 8.6 MG/1
2 TABLET ORAL
Qty: 14 | Refills: 0 | OUTPATIENT
Start: 2019-01-07 | End: 2019-01-13

## 2019-01-07 RX ORDER — ENOXAPARIN SODIUM 100 MG/ML
80 INJECTION SUBCUTANEOUS
Qty: 480 | Refills: 0 | OUTPATIENT
Start: 2019-01-07 | End: 2019-01-09

## 2019-01-07 RX ORDER — ACETAMINOPHEN 500 MG
2 TABLET ORAL
Qty: 0 | Refills: 0 | DISCHARGE
Start: 2019-01-07

## 2019-01-07 RX ORDER — ENOXAPARIN SODIUM 100 MG/ML
80 INJECTION SUBCUTANEOUS
Qty: 480 | Refills: 0
Start: 2019-01-07 | End: 2019-01-09

## 2019-01-07 RX ADMIN — TACROLIMUS 1 MILLIGRAM(S): 5 CAPSULE ORAL at 11:53

## 2019-01-07 RX ADMIN — Medication 20 MILLIGRAM(S): at 06:25

## 2019-01-07 RX ADMIN — ENOXAPARIN SODIUM 80 MILLIGRAM(S): 100 INJECTION SUBCUTANEOUS at 06:27

## 2019-01-07 RX ADMIN — Medication 25 MILLIGRAM(S): at 06:25

## 2019-01-07 RX ADMIN — GABAPENTIN 100 MILLIGRAM(S): 400 CAPSULE ORAL at 06:25

## 2019-01-07 RX ADMIN — Medication 50 GRAM(S): at 02:45

## 2019-01-07 RX ADMIN — Medication 100 MILLIGRAM(S): at 06:25

## 2019-01-07 RX ADMIN — Medication 3 MILLIGRAM(S): at 14:02

## 2019-01-07 RX ADMIN — AMLODIPINE BESYLATE 5 MILLIGRAM(S): 2.5 TABLET ORAL at 06:25

## 2019-01-07 RX ADMIN — Medication 40 MILLIEQUIVALENT(S): at 02:48

## 2019-01-07 RX ADMIN — Medication 3 MILLIGRAM(S): at 06:27

## 2019-01-07 RX ADMIN — Medication 10 MILLIGRAM(S): at 06:25

## 2019-01-07 RX ADMIN — GABAPENTIN 100 MILLIGRAM(S): 400 CAPSULE ORAL at 14:01

## 2019-01-07 RX ADMIN — Medication 250 MILLIGRAM(S): at 06:25

## 2019-01-07 NOTE — PROGRESS NOTE ADULT - ASSESSMENT
Infected FT necrotic wound of left leg   PT to go to OR in am for debridement   Continue elevation and IV abx   Htn - continue regimen and monitoring
abscess and infected left lower leg wound post debridement--> improved--> local wound care, iv abx,
infected left lower leg wound post bedside debridement--> local wound care, iv abx, may needed operative debridement
infected wound and abscess left  leg post debridemnt--> healing--> local wound care, iv abx for MRSA    r/o c.diff--> await cx results, start po abx
left leg wound post debridement --> healing--> local wound care, oral abx, d/c home
· Assessment		  I  Debridement  01/04/2019  sharp excisional debridement and abscess drainage left lower [id number]    IMPRESSION:  Abscess secondary to staph aureus  Cellulitis LLE  No sepsis    RECOMMENDATIONS:  Vancomycin 1 gm iv q12h  Unasyn 3 gm iv q6h  Will adjust ABx based on whether ORSA
· Assessment		  I  Debridement  01/04/2019  sharp excisional debridement and abscess drainage left lower [id number]    IMPRESSION:  Abscess secondary to HARINI  Cellulitis LLE  No sepsis    RECOMMENDATIONS:  Po Doxycycline 100 mg q12h for 10 days  Recall prn please

## 2019-01-07 NOTE — PROGRESS NOTE ADULT - SUBJECTIVE AND OBJECTIVE BOX
DONI PATRICK  73y, Female      OVERNIGHT EVENTS:    none    VITALS:  T(F): 98.6, Max: 98.6 (01-07-19 @ 06:45)  HR: 82  BP: 168/82  RR: --Vital Signs Last 24 Hrs  T(C): 37 (07 Jan 2019 06:45), Max: 37 (07 Jan 2019 06:45)  T(F): 98.6 (07 Jan 2019 06:45), Max: 98.6 (07 Jan 2019 06:45)  HR: 82 (07 Jan 2019 06:45) (82 - 88)  BP: 168/82 (07 Jan 2019 06:45) (152/68 - 168/82)  BP(mean): --  RR: --  SpO2: 99% (07 Jan 2019 05:05) (99% - 99%)    TESTS & MEASUREMENTS:                        13.0   11.60 )-----------( 314      ( 06 Jan 2019 18:36 )             41.3     01-06    146  |  103  |  16  ----------------------------<  157<H>  3.2<L>   |  25  |  1.1    Ca    8.8      06 Jan 2019 18:36  Phos  3.3     01-06  Mg     1.7     01-06          Culture - Surgical Swab (collected 01-04-19 @ 11:00)  Source: .Surgical Swab None  Preliminary Report (01-06-19 @ 22:07):    Few Methicillin resistant Staphylococcus aureus  Organism: Methicillin resistant Staphylococcus aureus (01-06-19 @ 22:06)  Organism: Methicillin resistant Staphylococcus aureus (01-06-19 @ 22:06)      -  Ampicillin/Sulbactam: R <=8/4      -  Cefazolin: R <=4      -  Clindamycin: S <=0.25      -  Daptomycin: S 0.5      -  Erythromycin: R >4      -  Gentamicin: S <=1 Should not be used as monotherapy      -  Linezolid: S 2      -  Oxacillin: R >2      -  Penicillin: R >8      -  RIF- Rifampin: S <=1 Should not be used as monotherapy      -  Tetra/Doxy: S <=1      -  Trimethoprim/Sulfamethoxazole: S <=0.5/9.5      -  Vancomycin: S 2      Method Type: EROS    Culture - Other (collected 01-02-19 @ 17:02)  Source: .Other left leg wound  Final Report (01-04-19 @ 18:44):    Numerous Methicillin resistant Staphylococcus aureus  Organism: Methicillin resistant Staphylococcus aureus (01-04-19 @ 18:44)  Organism: Methicillin resistant Staphylococcus aureus (01-04-19 @ 18:44)      -  Ampicillin/Sulbactam: R 16/8      -  Cefazolin: R <=4      -  Clindamycin: S <=0.25      -  Daptomycin: S 0.5      -  Erythromycin: R >4      -  Gentamicin: S <=1 Should not be used as monotherapy      -  Linezolid: S 2      -  Oxacillin: R >2      -  Penicillin: R >8      -  RIF- Rifampin: S <=1 Should not be used as monotherapy      -  Tetra/Doxy: S <=1      -  Trimethoprim/Sulfamethoxazole: S <=0.5/9.5      -  Vancomycin: S 2      Method Type: EROS    Culture - Blood (collected 01-02-19 @ 13:17)  Source: .Blood Blood-Venous  Preliminary Report (01-04-19 @ 01:01):    No growth to date.            RADIOLOGY & ADDITIONAL TESTS:    ANTIBIOTICS:  vancomycin  IVPB 1000 milliGRAM(s) IV Intermittent every 12 hours

## 2019-01-07 NOTE — PROGRESS NOTE ADULT - SUBJECTIVE AND OBJECTIVE BOX
d/c note-> stable--> c.diff negative    Vital Signs Last 24 Hrs  T(C): 37 (07 Jan 2019 06:45), Max: 37 (07 Jan 2019 06:45)  T(F): 98.6 (07 Jan 2019 06:45), Max: 98.6 (07 Jan 2019 06:45)  HR: 82 (07 Jan 2019 06:45) (82 - 88)  BP: 168/82 (07 Jan 2019 06:45) (152/68 - 168/82)  BP(mean): --  RR: --  SpO2: 99% (07 Jan 2019 05:05) (99% - 99%)    CVP:  T(C): 37 (01-07-19 @ 06:45), Max: 37 (01-07-19 @ 06:45)  HR: 82 (01-07-19 @ 06:45) (82 - 88)  BP: 168/82 (01-07-19 @ 06:45) (152/68 - 168/82)  RR: --  SpO2: 99% (01-07-19 @ 05:05) (99% - 99%)  CVP(mm Hg): --    U.O.:  I&O's Detail                                    13.0   11.60 )-----------( 314      ( 06 Jan 2019 18:36 )             41.3     01-06    146  |  103  |  16  ----------------------------<  157<H>  3.2<L>   |  25  |  1.1    Ca    8.8      06 Jan 2019 18:36  Phos  3.3     01-06  Mg     1.7     01-06        Large Dressing Change---> left leg--> wound healing

## 2019-01-07 NOTE — PROGRESS NOTE ADULT - REASON FOR ADMISSION
LLE wound and swelling

## 2019-01-08 ENCOUNTER — OUTPATIENT (OUTPATIENT)
Dept: OUTPATIENT SERVICES | Facility: HOSPITAL | Age: 74
LOS: 1 days | Discharge: HOME | End: 2019-01-08

## 2019-01-08 DIAGNOSIS — Z79.01 LONG TERM (CURRENT) USE OF ANTICOAGULANTS: ICD-10-CM

## 2019-01-08 DIAGNOSIS — Z98.890 OTHER SPECIFIED POSTPROCEDURAL STATES: Chronic | ICD-10-CM

## 2019-01-08 DIAGNOSIS — I27.82 CHRONIC PULMONARY EMBOLISM: ICD-10-CM

## 2019-01-08 LAB
CULTURE RESULTS: SIGNIFICANT CHANGE UP
POCT INR: 1.5 RATIO — HIGH (ref 0.9–1.2)
POCT PT: 17.4 SEC — HIGH (ref 10–13.4)
SPECIMEN SOURCE: SIGNIFICANT CHANGE UP

## 2019-01-09 DIAGNOSIS — K75.4 AUTOIMMUNE HEPATITIS: ICD-10-CM

## 2019-01-09 DIAGNOSIS — D64.9 ANEMIA, UNSPECIFIED: ICD-10-CM

## 2019-01-09 DIAGNOSIS — I27.82 CHRONIC PULMONARY EMBOLISM: ICD-10-CM

## 2019-01-09 DIAGNOSIS — L03.116 CELLULITIS OF LEFT LOWER LIMB: ICD-10-CM

## 2019-01-09 DIAGNOSIS — M79.89 OTHER SPECIFIED SOFT TISSUE DISORDERS: ICD-10-CM

## 2019-01-09 DIAGNOSIS — B95.62 METHICILLIN RESISTANT STAPHYLOCOCCUS AUREUS INFECTION AS THE CAUSE OF DISEASES CLASSIFIED ELSEWHERE: ICD-10-CM

## 2019-01-09 DIAGNOSIS — L02.416 CUTANEOUS ABSCESS OF LEFT LOWER LIMB: ICD-10-CM

## 2019-01-09 DIAGNOSIS — I10 ESSENTIAL (PRIMARY) HYPERTENSION: ICD-10-CM

## 2019-01-09 DIAGNOSIS — J45.909 UNSPECIFIED ASTHMA, UNCOMPLICATED: ICD-10-CM

## 2019-01-09 DIAGNOSIS — Z86.718 PERSONAL HISTORY OF OTHER VENOUS THROMBOSIS AND EMBOLISM: ICD-10-CM

## 2019-01-09 DIAGNOSIS — D68.52 PROTHROMBIN GENE MUTATION: ICD-10-CM

## 2019-01-09 DIAGNOSIS — L97.822 NON-PRESSURE CHRONIC ULCER OF OTHER PART OF LEFT LOWER LEG WITH FAT LAYER EXPOSED: ICD-10-CM

## 2019-01-09 DIAGNOSIS — Z79.52 LONG TERM (CURRENT) USE OF SYSTEMIC STEROIDS: ICD-10-CM

## 2019-01-09 DIAGNOSIS — I96 GANGRENE, NOT ELSEWHERE CLASSIFIED: ICD-10-CM

## 2019-01-09 DIAGNOSIS — Z79.01 LONG TERM (CURRENT) USE OF ANTICOAGULANTS: ICD-10-CM

## 2019-01-09 DIAGNOSIS — R19.7 DIARRHEA, UNSPECIFIED: ICD-10-CM

## 2019-01-09 LAB
CULTURE RESULTS: SIGNIFICANT CHANGE UP
ORGANISM # SPEC MICROSCOPIC CNT: SIGNIFICANT CHANGE UP
ORGANISM # SPEC MICROSCOPIC CNT: SIGNIFICANT CHANGE UP
SPECIMEN SOURCE: SIGNIFICANT CHANGE UP

## 2019-01-10 ENCOUNTER — APPOINTMENT (OUTPATIENT)
Dept: BURN CARE | Facility: CLINIC | Age: 74
End: 2019-01-10

## 2019-01-10 ENCOUNTER — OUTPATIENT (OUTPATIENT)
Dept: OUTPATIENT SERVICES | Facility: HOSPITAL | Age: 74
LOS: 1 days | Discharge: HOME | End: 2019-01-10

## 2019-01-10 DIAGNOSIS — Z98.890 OTHER SPECIFIED POSTPROCEDURAL STATES: Chronic | ICD-10-CM

## 2019-01-10 RX ORDER — COLLAGENASE SANTYL 250 [ARB'U]/G
250 OINTMENT TOPICAL DAILY
Qty: 1 | Refills: 5 | Status: ACTIVE | COMMUNITY
Start: 2019-01-10 | End: 1900-01-01

## 2019-01-10 NOTE — PHYSICAL EXAM
[Healing] : healing [Size%: ______] : Size: [unfilled]% [Infected?] : Infected: No [3] : 3 out of 10 [Abnormal] : abnormal [Medium] : medium [] : no [de-identified] : left lower leg wound healing--> local wound care--> rx for santyl
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
## 2019-01-10 NOTE — HISTORY OF PRESENT ILLNESS
[Did you have an operation on your burn/wound injury?] : Did you have an operation on your burn/wound injury? Yes [Did this injury occur on the job?] : Did this injury occur on the job? No [de-identified] : left lower leg wound [de-identified] : wound healing

## 2019-01-11 ENCOUNTER — OUTPATIENT (OUTPATIENT)
Dept: OUTPATIENT SERVICES | Facility: HOSPITAL | Age: 74
LOS: 1 days | Discharge: HOME | End: 2019-01-11

## 2019-01-11 DIAGNOSIS — Z98.890 OTHER SPECIFIED POSTPROCEDURAL STATES: Chronic | ICD-10-CM

## 2019-01-11 DIAGNOSIS — I27.82 CHRONIC PULMONARY EMBOLISM: ICD-10-CM

## 2019-01-11 DIAGNOSIS — Z79.01 LONG TERM (CURRENT) USE OF ANTICOAGULANTS: ICD-10-CM

## 2019-01-11 LAB
POCT INR: 1.8 RATIO — HIGH (ref 0.9–1.2)
POCT PT: 21.5 SEC — HIGH (ref 10–13.4)

## 2019-01-15 ENCOUNTER — OUTPATIENT (OUTPATIENT)
Dept: OUTPATIENT SERVICES | Facility: HOSPITAL | Age: 74
LOS: 1 days | Discharge: HOME | End: 2019-01-15

## 2019-01-15 DIAGNOSIS — Z79.01 LONG TERM (CURRENT) USE OF ANTICOAGULANTS: ICD-10-CM

## 2019-01-15 DIAGNOSIS — Z98.890 OTHER SPECIFIED POSTPROCEDURAL STATES: Chronic | ICD-10-CM

## 2019-01-15 DIAGNOSIS — I27.82 CHRONIC PULMONARY EMBOLISM: ICD-10-CM

## 2019-01-15 LAB
POCT INR: 2.5 RATIO — HIGH (ref 0.9–1.2)
POCT PT: 29.7 SEC — HIGH (ref 10–13.4)

## 2019-01-17 ENCOUNTER — APPOINTMENT (OUTPATIENT)
Dept: WOUND CARE | Facility: CLINIC | Age: 74
End: 2019-01-17

## 2019-01-17 ENCOUNTER — OUTPATIENT (OUTPATIENT)
Dept: OUTPATIENT SERVICES | Facility: HOSPITAL | Age: 74
LOS: 1 days | Discharge: HOME | End: 2019-01-17

## 2019-01-17 DIAGNOSIS — Z98.890 OTHER SPECIFIED POSTPROCEDURAL STATES: Chronic | ICD-10-CM

## 2019-01-17 NOTE — PHYSICAL EXAM
[Healing] : healing [Size%: ______] : Size: [unfilled]% [Infected?] : Infected: No [3] : 3 out of 10 [Abnormal] : abnormal [Medium] : medium [] : no [de-identified] : left lower leg wound healing--> local wound care--> cont santyl

## 2019-01-17 NOTE — PATIENT PROFILE ADULT. - PATIENT REPRESENTATIVE NAME
ANTICOAGULATION FOLLOW-UP CLINIC VISIT    Patient Name:  Chyna Dawkins  Date:  2019  Contact Type:  Telephone    SUBJECTIVE:     Patient Findings     Positives:   No Problem Findings           OBJECTIVE    INR   Date Value Ref Range Status   2019 2.2  Final       ASSESSMENT / PLAN  No question data found.  Anticoagulation Summary  As of 2019    INR goal:   2.0-3.0   TTR:   48.5 % (2.6 y)   INR used for dosin.2 (2019)   Warfarin maintenance plan:   3 mg (1 mg x 3) every day   Full warfarin instructions:   3 mg every day   Weekly warfarin total:   21 mg   Plan last modified:   Jayla Lawson RN (1/10/2019)   Next INR check:   2019   Priority:   INR   Target end date:   Indefinite    Indications    Afib (H) [I48.91]  Chronic atrial fibrillation (H) [I48.2]             Anticoagulation Episode Summary     INR check location:   Home Draw    Preferred lab:       Send INR reminders to:    [facility]    Comments:   Will get labs in Transplant Clinic in [place]  HIPPA INFO OK to leave messages on cell [phone], or home phone.  or with son Taras Dawkins  or daughter in law Corina Dawkins   12   Goal 2-3   transplant would like 2-2.5   Has [employer] Monitoring      Anticoagulation Care Providers     Provider Role Specialty Phone number    Kelly Wiley MD [facility] Internal Medicine [phone]            See the Encounter Report to view Anticoagulation Flowsheet and Dosing Calendar (Go to Encounters tab in chart review, and find the Anticoagulation Therapy Visit)    Left message for patient with results and dosing recommendations. Asked patient to call back to report any missed doses, falls, signs and symptoms of bleeding or clotting, any changes in health, medication, or diet. Asked patient to call back with any questions or concerns.     Jayla Lawson, RN                  LISBETH
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
## 2019-01-17 NOTE — HISTORY OF PRESENT ILLNESS
[Did you have an operation on your burn/wound injury?] : Did you have an operation on your burn/wound injury? Yes [Did this injury occur on the job?] : Did this injury occur on the job? No [de-identified] : left lower leg wound [de-identified] : wound healing
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
## 2019-01-22 DIAGNOSIS — S81.802A UNSPECIFIED OPEN WOUND, LEFT LOWER LEG, INITIAL ENCOUNTER: ICD-10-CM
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
## 2019-01-23 ENCOUNTER — OUTPATIENT (OUTPATIENT)
Dept: OUTPATIENT SERVICES | Facility: HOSPITAL | Age: 74
LOS: 1 days | Discharge: HOME | End: 2019-01-23

## 2019-01-23 DIAGNOSIS — I27.82 CHRONIC PULMONARY EMBOLISM: ICD-10-CM

## 2019-01-23 DIAGNOSIS — Z98.890 OTHER SPECIFIED POSTPROCEDURAL STATES: Chronic | ICD-10-CM

## 2019-01-23 DIAGNOSIS — Z79.01 LONG TERM (CURRENT) USE OF ANTICOAGULANTS: ICD-10-CM
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
## 2019-01-24 DIAGNOSIS — S81.802A UNSPECIFIED OPEN WOUND, LEFT LOWER LEG, INITIAL ENCOUNTER: ICD-10-CM
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
## 2019-01-24 DIAGNOSIS — Z02.9 ENCOUNTER FOR ADMINISTRATIVE EXAMINATIONS, UNSPECIFIED: ICD-10-CM

## 2019-01-24 NOTE — ED ADULT TRIAGE NOTE - HEIGHT IN CM
January 24, 2019    LOREN PADILLA  [address]    [place] [address]      Dear Loren:    As a member of [facility] ([facility]) ([facility]), you are provided a care coordinator. I will be your new care coordinator as of 02/01/2019. I will be calling you soon to see how you are doing and determine your needs.    If you have any questions, please feel free to call me at [phone]. If you reach my voice mail, please leave a message and your phone number. If you are hearing impaired, please call the [place] Relay at [phone] or [phone] ([email or web address] relay service).    I look forward to speaking with you soon.    Sincerely,    Lupe Hyman RN    E-mail: [email or web address]  Phone: [phone]      [facility] is a health plan that contracts with both Medicare and the [place] Medical Assistance (Medicaid) program to provide benefits of both programs to enrollees. Enrollment in [facility] depends on contract renewal.      [facility]  [id number] DHS Approved ([id number])  [id number] (11/18)                  162.56

## 2019-01-31 ENCOUNTER — OUTPATIENT (OUTPATIENT)
Dept: OUTPATIENT SERVICES | Facility: HOSPITAL | Age: 74
LOS: 1 days | Discharge: HOME | End: 2019-01-31

## 2019-01-31 ENCOUNTER — APPOINTMENT (OUTPATIENT)
Dept: WOUND CARE | Facility: CLINIC | Age: 74
End: 2019-01-31

## 2019-01-31 DIAGNOSIS — Z98.890 OTHER SPECIFIED POSTPROCEDURAL STATES: Chronic | ICD-10-CM

## 2019-01-31 NOTE — HISTORY OF PRESENT ILLNESS
[Did you have an operation on your burn/wound injury?] : Did you have an operation on your burn/wound injury? Yes [de-identified] : Patient using Santyl dressing and doing dressing changes by herself and with visiting nurse support tiw. No specific concerns  [Did this injury occur on the job?] : Did this injury occur on the job? No [de-identified] : left lower leg wound [de-identified] : wound healing

## 2019-01-31 NOTE — ASSESSMENT
[Wound Care] : wound care [FreeTextEntry1] : Small portion of fibrinous exudate removed from wound but unable to tolerate due to pain\par \par Rec:\par - Wet to dry dressing for removal and fibrinous exudate,\par  resume Santyl with visiting nurse assistance in 4 days \par - Follow up in clinic in two weeks.
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
## 2019-01-31 NOTE — PHYSICAL EXAM
[Healing] : healing [Size%: ______] : Size: [unfilled]% [3] : 3 out of 10 [Abnormal] : abnormal [Medium] : medium [Infected?] : Infected: No [] : no [de-identified] : left lower leg wound ~3 x 3 cm -yellow exudate overlying wound with pink granulation visible\par

## 2019-02-06 ENCOUNTER — OUTPATIENT (OUTPATIENT)
Dept: OUTPATIENT SERVICES | Facility: HOSPITAL | Age: 74
LOS: 1 days | Discharge: HOME | End: 2019-02-06

## 2019-02-06 DIAGNOSIS — Z98.890 OTHER SPECIFIED POSTPROCEDURAL STATES: Chronic | ICD-10-CM

## 2019-02-06 DIAGNOSIS — I27.82 CHRONIC PULMONARY EMBOLISM: ICD-10-CM

## 2019-02-06 DIAGNOSIS — Z79.01 LONG TERM (CURRENT) USE OF ANTICOAGULANTS: ICD-10-CM

## 2019-02-06 LAB
POCT INR: 3 RATIO — HIGH (ref 0.9–1.2)
POCT PT: 36.5 SEC — HIGH (ref 10–13.4)
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
## 2019-02-11 DIAGNOSIS — S81.802A UNSPECIFIED OPEN WOUND, LEFT LOWER LEG, INITIAL ENCOUNTER: ICD-10-CM

## 2019-02-14 ENCOUNTER — APPOINTMENT (OUTPATIENT)
Dept: WOUND CARE | Facility: CLINIC | Age: 74
End: 2019-02-14

## 2019-02-14 ENCOUNTER — OUTPATIENT (OUTPATIENT)
Dept: OUTPATIENT SERVICES | Facility: HOSPITAL | Age: 74
LOS: 1 days | Discharge: HOME | End: 2019-02-14

## 2019-02-14 DIAGNOSIS — Z98.890 OTHER SPECIFIED POSTPROCEDURAL STATES: Chronic | ICD-10-CM
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
## 2019-02-14 NOTE — PHYSICAL EXAM
[Healing] : healing [Size%: ______] : Size: [unfilled]% [Infected?] : Infected: No [3] : 3 out of 10 [Abnormal] : abnormal [Medium] : medium [] : no [de-identified] : aquacel [de-identified] : left lower leg wound healing--> local wound care--> aquacel

## 2019-02-14 NOTE — HISTORY OF PRESENT ILLNESS
[Did you have an operation on your burn/wound injury?] : Did you have an operation on your burn/wound injury? Yes [Did this injury occur on the job?] : Did this injury occur on the job? No [de-identified] : left lower leg wound [de-identified] : wound healing

## 2019-02-15 ENCOUNTER — EMERGENCY (EMERGENCY)
Facility: HOSPITAL | Age: 74
LOS: 0 days | Discharge: HOME | End: 2019-02-15
Attending: EMERGENCY MEDICINE | Admitting: EMERGENCY MEDICINE

## 2019-02-15 VITALS
RESPIRATION RATE: 20 BRPM | HEART RATE: 85 BPM | TEMPERATURE: 97 F | DIASTOLIC BLOOD PRESSURE: 81 MMHG | OXYGEN SATURATION: 96 % | SYSTOLIC BLOOD PRESSURE: 138 MMHG

## 2019-02-15 VITALS
RESPIRATION RATE: 20 BRPM | HEART RATE: 70 BPM | SYSTOLIC BLOOD PRESSURE: 163 MMHG | TEMPERATURE: 97 F | OXYGEN SATURATION: 98 % | DIASTOLIC BLOOD PRESSURE: 79 MMHG

## 2019-02-15 DIAGNOSIS — I10 ESSENTIAL (PRIMARY) HYPERTENSION: ICD-10-CM

## 2019-02-15 DIAGNOSIS — Z79.52 LONG TERM (CURRENT) USE OF SYSTEMIC STEROIDS: ICD-10-CM

## 2019-02-15 DIAGNOSIS — Z79.02 LONG TERM (CURRENT) USE OF ANTITHROMBOTICS/ANTIPLATELETS: ICD-10-CM

## 2019-02-15 DIAGNOSIS — L03.116 CELLULITIS OF LEFT LOWER LIMB: ICD-10-CM

## 2019-02-15 DIAGNOSIS — Z79.891 LONG TERM (CURRENT) USE OF OPIATE ANALGESIC: ICD-10-CM

## 2019-02-15 DIAGNOSIS — Z98.890 OTHER SPECIFIED POSTPROCEDURAL STATES: Chronic | ICD-10-CM

## 2019-02-15 DIAGNOSIS — J45.909 UNSPECIFIED ASTHMA, UNCOMPLICATED: ICD-10-CM

## 2019-02-15 DIAGNOSIS — Z79.1 LONG TERM (CURRENT) USE OF NON-STEROIDAL ANTI-INFLAMMATORIES (NSAID): ICD-10-CM

## 2019-02-15 DIAGNOSIS — M79.669 PAIN IN UNSPECIFIED LOWER LEG: ICD-10-CM

## 2019-02-15 RX ORDER — MOXIFLOXACIN HYDROCHLORIDE TABLETS, 400 MG 400 MG/1
1 TABLET, FILM COATED ORAL
Qty: 15 | Refills: 0 | OUTPATIENT
Start: 2019-02-15 | End: 2019-02-19

## 2019-02-15 RX ORDER — SODIUM CHLORIDE 9 MG/ML
1000 INJECTION INTRAMUSCULAR; INTRAVENOUS; SUBCUTANEOUS ONCE
Qty: 0 | Refills: 0 | Status: DISCONTINUED | OUTPATIENT
Start: 2019-02-15 | End: 2019-02-15
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
## 2019-02-15 NOTE — ED PROVIDER NOTE - PHYSICAL EXAMINATION
VITAL SIGNS: I have reviewed the initial vital signs.   CONSTITUTIONAL: Awake, alert. Well-developed; well-nourished; in no distress. Non-toxic appearing.   SKIN: Medial lower aspect of LLE with small area of warmth and redness, most consistent with cellulitis.   HEAD: Normocephalic; atraumatic.   CARD: No chest wall deformity or tenderness. S1, S2 normal; no murmurs, gallops, or rubs. Regular rate and rhythm.  RESP: Good air movement. Lungs CTAB. No crackles, wheezes, rales or rhonchi.  EXT: No bony deformity or tenderness.

## 2019-02-15 NOTE — ED PROVIDER NOTE - NS ED ROS FT
Except as documented in HPI, all other ROS negative.   GENERAL: Denies fever/chills, loss of appetite/weight or fatigue.  SKIN: + warmth and redness to LLE.  MSK: Denies myalgias, bony deformity or pain.   NEURO: Denies paresthesias, tingling, weakness.

## 2019-02-15 NOTE — ED ADULT NURSE NOTE - OBJECTIVE STATEMENT
s/p left wound debridement with MD Kang. Pt presents with open wound to left LE. Pt seen by MD Kang in ER, with I&D at bedside.
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
## 2019-02-15 NOTE — ED PROVIDER NOTE - CARE PROVIDER_API CALL
Enrike Kang)  Plastic Surgery  [address] [place] [address]  Phone: [phone]  Fax: [phone]  Follow Up Time:

## 2019-02-15 NOTE — CONSULT NOTE ADULT - SUBJECTIVE AND OBJECTIVE BOX
Griselda Hudson is a 75 yo female with PMH significant for Asthma, Autoimmune hepatitis treated with steroids, HTN, PE and DVT (on coumadin) who presents with a wound to the left lower extremity. Patient was seen in Dr. Salas office for a wound to the left anterior shin yesterday, however reports overnight having developed an additional wound adjacent to the one currently being treated. She is unsure how she obtained the wound, however her home care nurse evaluated the area today and recommended she be seen with concern for infection. Patient denies, fever, chills, malaise but does note redness and extreme discomfort when the area is palpated. Patient is evaluated with Dr. Kang at bedside.       Vital Signs Last 24 Hrs  T(C): 36.1 (15 Feb 2019 17:28), Max: 36.2 (15 Feb 2019 14:25)  T(F): 97 (15 Feb 2019 17:28), Max: 97.1 (15 Feb 2019 14:25)  HR: 70 (15 Feb 2019 17:28) (70 - 85)  BP: 163/79 (15 Feb 2019 17:28) (138/81 - 163/79)  BP(mean): --  RR: 20 (15 Feb 2019 17:28) (20 - 20)  SpO2: 98% (15 Feb 2019 17:28) (96% - 98%)      PHYSICAL EXAM:  GENERAL: NAD, well-developed  CHEST/LUNG: speaking in full sentences, no respiratory distress  HEART: Regular rate    NEUROLOGY: AAOx3  SKIN: Healing wound to left anterior shin with healthy appearing granulation tissue. Medial to this wound is a lesion with a dark head <0.5cm2. The area is significantly TTP with surrounding erythema and warmth. Minimal fluctuance appreciated and no induration present.       Incision and Drainage performed by Dr. Kang at bedside:   Area is prepped with Betadine solution   Sterile technique used   20cc lidocaine with Epinephrine injected to site of lesion on left lower leg   Small incision is made with a #15 blade with small amount of purulent drainage return; culture obtained   Cavity opened further with scissors with no further evidence of loculations or tracking  Wound is packed with Xeroform gauze and dressed with Kerlix and ACE wrap  Patient tolerated procedure well      Assessment:   Cellulitis with small abscess formation      Plan:   Discharge home   Perform twice daily wet to dry dressing changes to wound on left lower extremity   PO Augmentin and Ciprofloxacin   Return precautions given  Follow-up in Burn Clinic with Dr. Kang Thursday 2/21/19

## 2019-02-15 NOTE — ED PROVIDER NOTE - OBJECTIVE STATEMENT
Pt is a 73 y/o Female, PMHX of autoimmune hepatitis, Asthma, DVT on Coumadin, PE, htn, presents to ED w/ cellulitis to E. Pt reports she had debridement of small wound to medial aspect of LLE approx 2 weeks ago by Dr. Kang. She has a visiting nurse come to her house to do local wound care. Today, pt noticed over site of debridement it was red, warm & tender. Visiting nurse sent a picture to Dr. Kang, who recommended that she come to the ER and he would "see her here." Pt denies numbness, tingling, weakness, difficulty with ambulation, trauma.
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
## 2019-02-16 RX ORDER — MOXIFLOXACIN HYDROCHLORIDE TABLETS, 400 MG 400 MG/1
1 TABLET, FILM COATED ORAL
Qty: 10 | Refills: 0
Start: 2019-02-16 | End: 2019-02-20

## 2019-02-16 NOTE — ED POST DISCHARGE NOTE - ADDITIONAL DOCUMENTATION
There was an error with the initial rx.  I sent a new rx to pharmacy.  I called [facility] pharmacy today to confirm the change.  I confirmed with pharmacy that pt is to get cipro 500 mg bid for 5 days and augmentin BID for 5 days.

## 2019-02-20 ENCOUNTER — OUTPATIENT (OUTPATIENT)
Dept: OUTPATIENT SERVICES | Facility: HOSPITAL | Age: 74
LOS: 1 days | Discharge: HOME | End: 2019-02-20

## 2019-02-20 DIAGNOSIS — Z98.890 OTHER SPECIFIED POSTPROCEDURAL STATES: Chronic | ICD-10-CM

## 2019-02-20 LAB
POCT INR: 2.3 RATIO — HIGH (ref 0.9–1.2)
POCT PT: 27.7 SEC — HIGH (ref 10–13.4)

## 2019-02-21 ENCOUNTER — APPOINTMENT (OUTPATIENT)
Dept: BURN CARE | Facility: CLINIC | Age: 74
End: 2019-02-21

## 2019-02-21 ENCOUNTER — OUTPATIENT (OUTPATIENT)
Dept: OUTPATIENT SERVICES | Facility: HOSPITAL | Age: 74
LOS: 1 days | Discharge: HOME | End: 2019-02-21

## 2019-02-21 DIAGNOSIS — Z98.890 OTHER SPECIFIED POSTPROCEDURAL STATES: Chronic | ICD-10-CM

## 2019-02-21 NOTE — HISTORY OF PRESENT ILLNESS
[Did you have an operation on your burn/wound injury?] : Did you have an operation on your burn/wound injury? Yes [Did this injury occur on the job?] : Did this injury occur on the job? No [de-identified] : left lower leg wound\par pt seen in ED last week with new abscess --> drained [de-identified] : wound healing

## 2019-02-21 NOTE — PHYSICAL EXAM
[Healing] : healing [Size%: ______] : Size: [unfilled]% [Infected?] : Infected: No [3] : 3 out of 10 [Abnormal] : abnormal [Medium] : medium [] : no [de-identified] : aquacel [de-identified] : left lower leg wound healing--> local wound care--> NS wet to dry
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
## 2019-02-27 DIAGNOSIS — I27.82 CHRONIC PULMONARY EMBOLISM: ICD-10-CM

## 2019-02-27 DIAGNOSIS — Z79.01 LONG TERM (CURRENT) USE OF ANTICOAGULANTS: ICD-10-CM
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
## 2019-02-28 DIAGNOSIS — S81.802A UNSPECIFIED OPEN WOUND, LEFT LOWER LEG, INITIAL ENCOUNTER: ICD-10-CM
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
## 2019-03-03 ENCOUNTER — TRANSCRIPTION ENCOUNTER (OUTPATIENT)
Age: 74
End: 2019-03-03

## 2019-03-08 DIAGNOSIS — S81.802A UNSPECIFIED OPEN WOUND, LEFT LOWER LEG, INITIAL ENCOUNTER: ICD-10-CM
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
## 2019-04-01 NOTE — PROGRESS NOTE ADULT - SUBJECTIVE AND OBJECTIVE BOX
Chart reviewed, patient examined. Pertinent results reviewed.  specialist f/u reviewed  HD#5 (ER on 8/12); POD#2    SUBJECTIVE:  Patient is a 73y old Female who presents with a chief complaint of left  foot blister (13 Aug 2018 03:39)    Currently admitted to medicine with the primary diagnosis of Necrosis of skin or subcutaneous tissue   . This morning she is resting comfortably,  POD#1 after debridement of wound by Burn SVC. p/s pain < prior to  surgery; ID has not seen the pt yet.    PAST MEDICAL & SURGICAL HISTORY  Autoimmune hepatitis  Other chronic pulmonary embolism without acute cor pulmonale- on chronic AC-Warfarin  Essential hypertension  Autoimmune hepatitis treated with steroids  Asthma  DVT (deep venous thrombosis)  No significant past surgical history    SOCIAL HISTORY:  Negative for smoking/alcohol/drug use.     Home Medications:  Home Medications:  acetaminophen 325 mg oral tablet: 2 tab(s) orally every 6 hours, As needed, pain (12 Aug 2018 11:44)  albuterol 90 mcg/inh inhalation aerosol: 1 puff(s) inhaled every 4 hours, As Needed for shortness of breath (12 Aug 2018 11:44)  amLODIPine 5 mg oral tablet: 1 tab(s) orally once a day (12 Aug 2018 11:44)  budesonide 3 mg oral capsule, extended release:  (12 Aug 2018 11:44)  Cipro:  (12 Aug 2018 11:44)  furosemide 20 mg oral tablet:  (12 Aug 2018 11:44)  Iron 100 Plus:  (12 Aug 2018 11:44)  metoprolol tartrate 25 mg oral tablet: 1 tab(s) orally 2 times a day (12 Aug 2018 11:44)  montelukast 10 mg oral tablet: 1 tab(s) orally once a day (at bedtime) (12 Aug 2018 11:44)  pantoprazole 40 mg oral delayed release tablet: 1 tab(s) orally once a day (before a meal) (12 Aug 2018 11:44)  predniSONE 10 mg oral tablet: 1 tab(s) orally once a day (12 Aug 2018 11:44)  tacrolimus 0.5 mg oral capsule: 1 cap(s) orally once a day (at bedtime) (12 Aug 2018 11:44)  tiotropium 18 mcg inhalation capsule: 1 cap(s) inhaled once a day (12 Aug 2018 11:44)  warfarin 2 mg oral tablet: 1 tab(s) orally once a day (12 Aug 2018 11:44)      ALLERGIES:  No Known Allergies    MEDICATIONS:  STANDING MEDICATIONS  amLODIPine   Tablet 5 milliGRAM(s) Oral daily  buDESOnide   EC Oral Capsule - Peds 3 milliGRAM(s) Oral daily  furosemide    Tablet 20 milliGRAM(s) Oral daily  lactated ringers. 1000 milliLiter(s) IV Continuous <Continuous>  metoprolol tartrate 25 milliGRAM(s) Oral two times a day  montelukast 10 milliGRAM(s) Oral at bedtime  pantoprazole    Tablet 40 milliGRAM(s) Oral before breakfast  predniSONE   Tablet 10 milliGRAM(s) Oral daily  tacrolimus 0.5 milliGRAM(s) Oral two times a day  tiotropium 18 MICROgram(s) Capsule 1 Capsule(s) Inhalation daily  vancomycin  IVPB 1000 milliGRAM(s) IV Intermittent every 12 hours    PRN MEDICATIONS  acetaminophen   Tablet. 650 milliGRAM(s) Oral every 6 hours PRN  ALBUTerol    90 MICROgram(s) HFA Inhaler 1 Puff(s) Inhalation every 4 hours PRN  HYDROmorphone  Injectable 0.5 milliGRAM(s) IV Push every 10 minutes PRN  HYDROmorphone  Injectable 1 milliGRAM(s) IV Push once PRN  ondansetron Injectable 4 milliGRAM(s) IV Push once PRN  oxyCODONE    5 mG/acetaminophen 325 mG 2 Tablet(s) Oral once PRN  oxyCODONE    5 mG/acetaminophen 325 mG 1 Tablet(s) Oral every 6 hours PRN  oxyCODONE    5 mG/acetaminophen 325 mG 2 Tablet(s) Oral every 6 hours PRN    Vital Signs Last 24 Hrs  T(C): 35.6 (16 Aug 2018 07:38), Max: 36.3 (16 Aug 2018 00:00)  T(F): 96 (16 Aug 2018 07:38), Max: 97.3 (16 Aug 2018 00:00)  HR: 68 (16 Aug 2018 07:38) (68 - 74)  BP: 174/81 (16 Aug 2018 07:38) (158/90 - 174/81)  BP(mean): --  RR: 18 (16 Aug 2018 07:38) (18 - 20)  SpO2: --    LABS:                          12.1   12.01 )-----------( 283      ( 16 Aug 2018 05:12 )             38.7                       12.6   10.07 )-----------( 285      ( 14 Aug 2018 01:07 )             40.0     08-14    146  |  106  |  17  ----------------------------<  102<H>  3.6   |  26  |  0.9    Ca    8.4<L>      14 Aug 2018 01:07  Phos  3.2     08-14  Mg     1.9     08-14    TPro  5.1<L>  /  Alb  3.0<L>  /  TBili  0.3  /  DBili  x   /  AST  26  /  ALT  43<H>  /  AlkPhos  135<H>  08-13    PT/INR - ( 13 Aug 2018 07:11 )   PT: 23.70 sec;   INR: 2.21 ratio         PTT - ( 13 Aug 2018 07:11 )  PTT:28.9 sec          Culture - Blood (collected 13 Aug 2018 00:48)  Source: .Blood Blood  Preliminary Report (14 Aug 2018 12:01):    No growth to date.    Culture - Blood (collected 13 Aug 2018 00:48)  Source: .Blood Blood  Preliminary Report (14 Aug 2018 12:01):    No growth to date.          RADIOLOGY:    PHYSICAL EXAM:  GEN: No acute distress; looks well, stands easily  LUNGS: Clear to auscultation bilaterally-min crackles L base   HEART: S1/S2 present. RRR.   ABD: Soft, non-tender, non-distended. Bowel sounds present  EXT: Left leg ulcer with necrotic center- now with surgical dressing  NEURO: AAOX3 Consent: Written consent was obtained and risks were reviewed including but not limited to scarring, infection, bleeding, scabbing, incomplete removal, nerve damage and allergy to anesthesia.
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
## 2019-04-01 NOTE — HISTORY OF PRESENT ILLNESS
[Did you have an operation on your burn/wound injury?] : Did you have an operation on your burn/wound injury? Yes [Did this injury occur on the job?] : Did this injury occur on the job? No [de-identified] : left lower leg wound\par pt seen in ED last week with new abscess --> drained [de-identified] : wound healed

## 2019-04-01 NOTE — PHYSICAL EXAM
[Closed] : closed [Size%: ______] : Size: [unfilled]% [Infected?] : Infected: No [3] : 3 out of 10 [Abnormal] : abnormal [None] : none [] : no [de-identified] : aquacel [de-identified] : left lower leg wound healed--> prn

## 2019-07-01 NOTE — H&P ADULT - ASSESSMENT
#) Sepsis 2/2 celluitis  - in ED WBC=15K, T=101.1  - CT LE: negative for nec fasc  - f/u blood Cx  - c/w Ancef 1g q8h  - Tylenol PRN for fever    #) HTN -     #) Asthma -     #) Hx of DVT/PE -     DVT ppx:  Diet: DASH  Activity: AAT  Code status: FULL  Dispo: pending 73 yo F with PMH of autoimmune hepatitis, HTN, 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    #) Sepsis 2/2 cellulitis  - in ED WBC=15K, T=101.1  - CT LE: negative for nec fasc  - f/u blood Cx  - c/w Ancef 1g q8h  - will use IV Lasix 40 IV daily for now, monitor daily weights, strict I&O  - Tylenol PRN for fever  - pain control w/ IV Morphine for now    #) Autoimmune hepatitis - c/w Prednisone + Tacrolimus, c/w Budesonide - non-formulary, instructed patient to bring own med     #) Prothrombin mutation - c/w Coumadin 2mg daily, daily INR    #) HTN - stable, c/w Norvasc    #) Asthma - stable, c/w Singulair    DVT ppx: on Coumadin  Diet: DASH  Activity: AAT  Code status: FULL  Dispo: pending 73 yo F with PMH of autoimmune hepatitis, HTN, 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    #) Sepsis 2/2 cellulitis  - in ED WBC=15K, T=101.1  - CT LE: negative for nec fasc  - f/u blood Cx  - c/w Ancef 1g q8h  - will use IV Lasix 40 IV daily for now, monitor daily weights, strict I&O  - Tylenol PRN for fever  - pain control w/ IV Morphine for now    #) Autoimmune hepatitis - c/w Prednisone + Tacrolimus, c/w Budesonide - non-formulary, instructed patient to bring own med     #) Prothrombin mutation - c/w Coumadin 2mg daily, daily INR    #) HTN - stable, c/w Norvasc +BB    #) Asthma - stable, c/w Singulair    DVT ppx: on Coumadin  Diet: DASH  Activity: AAT  Code status: FULL  Dispo: pending

## 2019-07-01 NOTE — ED PROVIDER NOTE - PHYSICAL EXAMINATION
CONSTITUTIONAL: Well-developed; well-nourished; in no acute distress.   SKIN: warm, dry  HEAD: Normocephalic; atraumatic.  EYES: PERRL, EOMI, no conjunctival erythema  ENT: No nasal discharge; airway clear.  NECK: Supple; non tender.  CARD: S1, S2 normal; no murmurs, gallops, or rubs. Regular rate and rhythm. 2+ pedal pulses B/L.  RESP: No wheezes, rales or rhonchi.  EXT: Normal ROM.  No clubbing, cyanosis. B/L pitting LE edema. Erythema of the RLE from mid thigh to mid leg. Tenderness of RLE.  LYMPH: No acute cervical adenopathy.  NEURO: Alert, oriented. Sensation grossly intact.  PSYCH: Cooperative, appropriate.

## 2019-07-01 NOTE — H&P ADULT - NSHPSOCIALHISTORY_GEN_ALL_CORE
Denies alcohol, tobacco, or illicit drug use Denies current alcohol, tobacco, or illicit drug use  History of smoking in past - quit 25 years ago. Reports ~30 years x 2 PPD prior

## 2019-07-01 NOTE — ED PROVIDER NOTE - CARE PLAN
Principal Discharge DX:	Cellulitis Principal Discharge DX:	Sepsis  Secondary Diagnosis:	Cellulitis of leg, right  Secondary Diagnosis:	Hypokalemia  Secondary Diagnosis:	Lactic acidosis

## 2019-07-01 NOTE — H&P ADULT - NSHPLABSRESULTS_GEN_ALL_CORE
13.5   15.55 )-----------( 235      ( 01 Jul 2019 10:36 )             42.9     07-01    142  |  103  |  17  ----------------------------<  127<H>  3.4<L>   |  23  |  1.1    Ca    8.5      01 Jul 2019 10:36    TPro  5.6<L>  /  Alb  3.1<L>  /  TBili  1.3<H>  /  DBili  x   /  AST  24  /  ALT  29  /  AlkPhos  103  07-01     Lactate Trend    CAPILLARY BLOOD GLUCOSE

## 2019-07-01 NOTE — ED PROVIDER NOTE - CLINICAL SUMMARY MEDICAL DECISION MAKING FREE TEXT BOX
74yF p/w pain to RLE - found to have extensive cellulitis w/o abscess or nec fasc. Pt septic, w/ rectal temp, tachycardic w/ elevated lactate though stable BP, improved with IVF, morphine and abx.  Pt admitted for further care.

## 2019-07-01 NOTE — H&P ADULT - NSICDXPASTMEDICALHX_GEN_ALL_CORE_FT
PAST MEDICAL HISTORY:  Asthma     Autoimmune hepatitis     Autoimmune hepatitis treated with steroids     DVT (deep venous thrombosis)     Essential hypertension     Other chronic pulmonary embolism without acute cor pulmonale

## 2019-07-01 NOTE — H&P ADULT - HISTORY OF PRESENT ILLNESS
73 yo F with PMH of autoimmune hepatitis, HTN, DVT/PE on Coumadin 2/2 Prothrombin gene mutation, asthma presented to ED complaining of worsening erythema of RLE. Denies any drainage or purulence. No fevers, chills, SOB, chest pain, N/V/C/D, abdominal pain, or other complaint or symptoms. Patient has a history of LE cellulitis that has required Burn debridement in the past. 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 Reports symptoms started on Saturday morning and progressively worsened. Also reports significant pain as well as worsening edema, chills, subjective fevers, and nausea day PTP. Patient reports she hit her L leg on a chair and cut herself on Friday night, though denies any trauma to R side. No insects or tick bites. Denies any drainage or purulence to RLE. Denies any other complaints or symptoms. No rash anywhere else on body. Patient has a history of LE cellulitis that has required Burn debridement in the past.

## 2019-07-01 NOTE — H&P ADULT - NSHPPHYSICALEXAM_GEN_ALL_CORE
GEN: NAD, comfortable  CARDIO: RRR, no m/r/g  RESP: CTAB, no w/r/r  ABD: soft, NT/ND, +BS  EXT: no edema, pp b/l  NEURO: AAOx3, grossly normal GEN: NAD, comfortable  CARDIO: RRR, no m/r/g  RESP: CTAB, no w/r/r  ABD: soft, NT/ND, +BS  EXT: +2 pitting edema b/l LE, multiple varicose veins + ecchymoses noted, RLE significant erythema ~ knee high + extremely tender to palpation  NEURO: AAOx3, grossly normal

## 2019-07-01 NOTE — ED PROVIDER NOTE - OBJECTIVE STATEMENT
74y F w/ PMH of Autoimmune Hepatitis, Asthma, DVT on Coumadin, PE, and HTN presents with erythema of the RLE for 2 days. States it has been getting progressively worse. No drainage. Denies trauma, fever, chills, n/v, or numbness/tingling.

## 2019-07-01 NOTE — ED PROVIDER NOTE - ATTENDING CONTRIBUTION TO CARE
74yF p/w RLE pain, redness and swelling. Pt w/ longstanding hx wound to LLE, but never RLE.  No associated fevers.    VS notable for  O2 sat 95% RA  pt alert, chronically ill but nontoxic appearing  tachycardic  breathing comfortably on RA  exam w/ extensive angry looking erythema to most of R calf w/o fluctuance, crepitus or gangrene      labs  IVF  rectal temp  abx  US for DVT  CT for ?nec fasc

## 2019-07-01 NOTE — ED PROVIDER NOTE - NS ED ROS FT
Eyes:  No visual changes, eye pain or discharge.  ENMT:  No hearing changes, pain, no sore throat or runny nose, no difficulty swallowing  Cardiac:  No chest pain, SOB. No chest pain with exertion. + edema  Respiratory:  No cough or respiratory distress. No hemoptysis. + history of asthma or RAD.  GI:  No nausea, vomiting, diarrhea or abdominal pain.  :  No dysuria, frequency or burning.  MS:  No myalgia, muscle weakness, joint pain or back pain. + LE pain  Neuro:  No headache or weakness.  No LOC.  Skin:  + skin rash.   Endocrine: No history of thyroid disease or diabetes.

## 2019-07-01 NOTE — H&P ADULT - ATTENDING COMMENTS
Patient well known to our service, with similar presentation in the past. Patient with autoimmune disease, denies trauma, injury, insect bite, with sudden onset of RLE pain swelling heat. patient unable to touch leg, even slightly. Patient came to the ER for further evaluation. Patient is well known to Dr Kang of burn team. Cultures drawn. Patient empirically started on IV antibiotics. Will have burn evaluate. Monitor INR closely. In the past patient did require blood transfusions. May need to consider holding Tacrolimus. Obtain level.     NAD  RLE densely swollen, warm, extremely tender to touch, redness tracking up the posterior thigh.

## 2019-07-02 NOTE — PROGRESS NOTE ADULT - ASSESSMENT
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       Patient seen and examined. She has been in extreme pain due to leg pain. On examination, the whole RLE is swollen and red. The erythema could be seen creeping her back of thigh too. The skin is red and leathery. The LLE also has history of similar lesion last year and needed extensive debridement.     #) Sepsis 2/2 cellulitis  - in ED WBC=15K, T=101.1  - CT LE: negative for nec fasc  - f/u blood Cx  - c/w Ancef 1g q8h  - will use IV Lasix 40 IV daily for now, monitor daily weights, strict I&O  - Tylenol PRN for fever  - pain control w/ IV Morphine for now  -Consult made with Burn and ID for followup    #) Autoimmune hepatitis   - c/w Prednisone + Tacrolimus, c/w Budesonide - non-formulary, instructed patient to bring own med     #) Prothrombin mutation   - c/w Coumadin 2mg daily, daily INR  -2.4 today    #) HTN - stable, c/w Norvasc +BB    #) Asthma - stable, c/w Singulair    DVT ppx: on Coumadin  Diet: DASH  Activity: AAT  Code status: FULL  Dispo: pending 73 yo F with PMH of autoimmune hepatitis, HTN, 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       Patient seen and examined. She has been in extreme pain due to leg pain. On examination, the whole RLE is swollen and red. The erythema could be seen creeping her back of thigh too. The skin is red and leathery. The LLE also has history of similar lesion last year and needed extensive debridement.     #) Sepsis 2/2 cellulitis  - in ED WBC=22 from 15K, T=101.1  - CT LE: negative for nec fasc  - f/u blood Cx  - c/w Ancef 1g q8h  - will use IV Lasix 40 IV daily for now, monitor daily weights, strict I&O  - Tylenol PRN for fever  - pain control w/ IV Morphine for now  -Consult made with Burn and ID for followup    #) Autoimmune hepatitis   - c/w Prednisone + Tacrolimus, c/w Budesonide - non-formulary, instructed patient to bring own med     #) Prothrombin mutation   - c/w Coumadin 2mg daily, daily INR  -2.4 today    #) HTN - stable, c/w Norvasc +BB    #) Asthma - stable, c/w Singulair    DVT ppx: on Coumadin  Diet: DASH  Activity: AAT  Code status: FULL  Dispo: pending 75 yo F with PMH of autoimmune hepatitis, HTN, 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       Patient seen and examined. She has been in extreme pain due to leg pain. On examination, the whole RLE is swollen and red. The erythema could be seen creeping her back of thigh too. The skin is red and leathery. The LLE also has history of similar lesion last year and needed extensive debridement.   The pt has a mobility limitation that significantly impairs the pts ability to participate in one or more MRADLs such as toileting, eating, dressing, and bathing in customary locations in the home.  The pts home provides adequate access between rooms for the use of the transport wheelchair.  The transport wheelchair will significantly improve the pts ability to participate in MRADLs and will be used on a regular basis in the home.  The pts mobility limitation cannot be resolved by the use of a walker or cane.  The pt does not have sufficient upper extremity function to safely propel the manual wheelchair in the home during a typical day.  The pt has agreed to use the transport wheelchair that is provided in the home. The patient has a caregiver that is willing and able to propel the transport wheelchair at anytime during the day.    #) Sepsis 2/2 cellulitis  - in ED WBC=22 from 15K, T=101.1  - CT LE: negative for nec fasc  - f/u blood Cx  - c/w Ancef 1g q8h  - will use IV Lasix 40 IV daily for now, monitor daily weights, strict I&O  - Tylenol PRN for fever  - pain control w/ IV Morphine for now  -Consult made with Burn and ID for followup    #) Autoimmune hepatitis   - c/w Prednisone + Tacrolimus, c/w Budesonide - non-formulary, instructed patient to bring own med     #) Prothrombin mutation   - c/w Coumadin 2mg daily, daily INR  -2.4 today    #) HTN - stable, c/w Norvasc +BB    #) Asthma - stable, c/w Singulair    DVT ppx: on Coumadin  Diet: DASH  Activity: AAT  Code status: FULL  Dispo: pending

## 2019-07-03 NOTE — CONSULT NOTE ADULT - SUBJECTIVE AND OBJECTIVE BOX
ELDER, DONI  74y, Female  Allergy: No Known Allergies      CHIEF COMPLAINT: Cellulitis (02 Jul 2019 13:59)      HPI:  73 yo F with PMH of autoimmune hepatitis, HTN, 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 Reports symptoms started on Saturday morning and progressively worsened. Also reports significant pain as well as worsening edema, chills, subjective fevers, and nausea day PTP. Patient reports she hit her L leg on a chair and cut herself on Friday night, though denies any trauma to R side. No insects or tick bites. Denies any drainage or purulence to RLE. Denies any other complaints or symptoms. No rash anywhere else on body. Patient has a history of LE cellulitis that has required Burn debridement in the past. (01 Jul 2019 16:00)      ID history: Pt was diagnosed 4 years ago with autoimmune hepatitis and has been on Tacrolimus and steroids, developed Bilateral lower extremity DVT  and PE 3 years ago and has been on coumadin since then. She injured her right leg last in feb 2018 after mechanical fall, which was closed but few days later she developed right leg cellulitis and was treated with debridement of necrotic tissue by the burn team. Her hospital course was then complicated by acute hypoxic RF requiring ICU admission; s/p BAL 2/2 ORSA/fungal PNA, CDiff, alveolar hemorrhage, and deconditioning requiring 1 month of SNF rehab;  She had another 2 admissions in August 2018 and then in Feb 2019 for left leg cellulitis treated with vanco.        Interval history: Pt is resting in bed, states she still has excruciating pain in right leg, 10/10 on severity, non radiating, cannot even tolerate covering leg with a thin sheet, cannot bear weight, increases with any movement and touch and decreased slightly with morphine.          FAMILY HISTORY:  No pertinent family history in first degree relatives    PAST MEDICAL & SURGICAL HISTORY:  Autoimmune hepatitis  Other chronic pulmonary embolism without acute cor pulmonale  Essential hypertension  Autoimmune hepatitis treated with steroids  Asthma  DVT (deep venous thrombosis)  S/P debridement  History of back surgery      Substance Use (  ) never used  ( x ) IVDU (  ) Other:  Tobacco Usage:  (   ) never smoked   (   ) former smoker   ( x  ) current smoker (  )  Alcohol Usage: (   ) social  (   ) daily use (   ) denies  (  x )        ROS  General: stated she had fever-undocumented over the weekend associated with chills and excessive sweating  HEENT: Denies headache, rhinorrhea, sore throat, eye pain  CV: Denies CP, palpitations  PULM: Denies SOB, cough  GI: Denies abdominal pain, diarrhea  : Denies dysuria, hematuria  MSK: Denies arthralgias  SKIN: Denies rash   NEURO: Denies paresthesias, weakness  PSYCH: Denies depression    VITALS:  T(F): 97, Max: 98.2 (07-02-19 @ 22:17)  HR: 92  BP: 141/65  RR: 18Vital Signs Last 24 Hrs  T(C): 36.1 (03 Jul 2019 04:58), Max: 36.8 (02 Jul 2019 22:17)  T(F): 97 (03 Jul 2019 04:58), Max: 98.2 (02 Jul 2019 22:17)  HR: 92 (03 Jul 2019 04:58) (83 - 97)  BP: 141/65 (03 Jul 2019 04:58) (108/62 - 150/70)  BP(mean): --  RR: 18 (03 Jul 2019 04:58) (17 - 18)  SpO2: 95% (03 Jul 2019 03:42) (95% - 95%)    PHYSICAL EXAM:  Gen: Mild-mod distress due to pain right leg  HEENT: Normocephalic, atraumatic  Neck: supple, no lymphadenopathy  CV: Regular rate & regular rhythm  Lungs: clear to auscultation  Abdomen: Soft, BS present  Ext:       large area of extravasation of blood from attempted iv site left arm.      Right leg erythema with swollen dorsum of right foot, an area of flatulence with surrounding erythematous and tenderness calf of right leg      scar on right shin due to previous I&D       Pt does not allow to examine right leg due to severe pain    Neuro: non focal, awake        TESTS & MEASUREMENTS:                        13.7   18.02 )-----------( 223      ( 03 Jul 2019 07:45 )             43.0     07-03    141  |  100  |  18  ----------------------------<  116<H>  3.8   |  26  |  1.3    Ca    8.6      03 Jul 2019 07:45  Phos  2.9     07-02  Mg     1.6     07-02    TPro  5.8<L>  /  Alb  2.8<L>  /  TBili  1.1  /  DBili  x   /  AST  18  /  ALT  22  /  AlkPhos  109  07-02    eGFR if Non African American: 40 mL/min/1.73M2 (07-03-19 @ 07:45)  eGFR if : 47 mL/min/1.73M2 (07-03-19 @ 07:45)    LIVER FUNCTIONS - ( 02 Jul 2019 04:30 )  Alb: 2.8 g/dL / Pro: 5.8 g/dL / ALK PHOS: 109 U/L / ALT: 22 U/L / AST: 18 U/L / GGT: x               Culture - Blood (collected 07-01-19 @ 14:30)  Source: .Blood Blood  Preliminary Report (07-02-19 @ 22:01):    No growth to date.    Culture - Blood (collected 07-01-19 @ 14:02)  Source: .Blood Blood  Preliminary Report (07-03-19 @ 02:06):    No growth to date.        Blood Gas Venous - Lactate: 1.3 mmoL/L (07-01-19 @ 13:25)  Blood Gas Venous - Lactate: 2.7 mmoL/L (07-01-19 @ 13:06)      INFECTIOUS DISEASES TESTING      RADIOLOGY & ADDITIONAL TESTS:  I have personally reviewed the last Chest xray  CXR      CT      CARDIOLOGY TESTING  12 Lead ECG:   Ventricular Rate 84 BPM    Atrial Rate 84 BPM    P-R Interval 142 ms    QRS Duration 82 ms    Q-T Interval 366 ms    QTC Calculation(Bezet) 432 ms    P Axis 12 degrees    R Axis 9 degrees    T Axis 10 degrees    Diagnosis Line Normal sinus rhythm  Normal ECG    Confirmed by Hiro Allen ([id number]) on 7/3/2019 9:41:20 AM (07-03-19 @ 08:12)      MEDICATIONS  amLODIPine   Tablet 5  ascorbic acid 500  ceFAZolin   IVPB 1000  chlorhexidine 4% Liquid 1  cyanocobalamin 1000  ferrous    sulfate 325  furosemide   Injectable 40  gabapentin 300  metoprolol tartrate 50  montelukast 10  predniSONE   Tablet 10  tacrolimus 0.5      ANTIBIOTICS:  ceFAZolin   IVPB 1000 milliGRAM(s) IV Intermittent every 8 hours ELDER, DONI  74y, Female  Allergy: No Known Allergies      CHIEF COMPLAINT: Cellulitis (02 Jul 2019 13:59)      HPI:  73 yo F with PMH of autoimmune hepatitis on tacro/steroids, HTN, 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 Reports symptoms started on Saturday morning and progressively worsened. Also reports significant pain as well as worsening edema, chills, subjective fevers, and nausea day PTP. Patient reports she hit her L leg on a chair and cut herself on Friday night, though denies any trauma to R side. No insects or tick bites. Denies any drainage or purulence to RLE. Denies any other complaints or symptoms. No rash anywhere else on body. Patient has a history of LE cellulitis that has required Burn debridement in the past. (01 Jul 2019 16:00)      ID history: Pt was diagnosed 4 years ago with autoimmune hepatitis and has been on Tacrolimus and steroids, developed Bilateral lower extremity DVT  and PE 3 years ago and has been on coumadin since then. She injured her right leg last in feb 2018 after mechanical fall, which was closed but few days later she developed right leg cellulitis and was treated with debridement of necrotic tissue by the burn team. Her hospital course was then complicated by acute hypoxic RF requiring ICU admission; s/p BAL 2/2 ORSA/fungal PNA, CDiff, alveolar hemorrhage, and deconditioning requiring 1 month of SNF rehab;  She had another 2 admissions in August 2018 and then in Feb 2019 for left leg cellulitis treated with vanco.    Interval history: Pt is resting in bed, states she still has excruciating pain in right leg, 10/10 on severity, non radiating, cannot even tolerate covering leg with a thin sheet, cannot bear weight, increases with any movement and touch and decreased slightly with morphine.      FAMILY HISTORY:  No pertinent family history in first degree relatives    PAST MEDICAL & SURGICAL HISTORY:  Autoimmune hepatitis  Other chronic pulmonary embolism without acute cor pulmonale  Essential hypertension  Autoimmune hepatitis treated with steroids  Asthma  DVT (deep venous thrombosis)  S/P debridement  History of back surgery      Substance Use (  ) never used  ( x ) IVDU (  ) Other:  Tobacco Usage:  (   ) never smoked   (   ) former smoker   ( x  ) current smoker (  )  Alcohol Usage: (   ) social  (   ) daily use (   ) denies  (  x )        ROS  General: stated she had fever-undocumented over the weekend associated with chills and excessive sweating  HEENT: Denies headache, rhinorrhea, sore throat, eye pain  CV: Denies CP, palpitations  PULM: Denies SOB, cough  GI: Denies abdominal pain, diarrhea  : Denies dysuria, hematuria  MSK: Denies arthralgias  SKIN: Denies rash   NEURO: Denies paresthesias, weakness  PSYCH: Denies depression    VITALS:  T(F): 97, Max: 98.2 (07-02-19 @ 22:17)  HR: 92  BP: 141/65  RR: 18Vital Signs Last 24 Hrs  T(C): 36.1 (03 Jul 2019 04:58), Max: 36.8 (02 Jul 2019 22:17)  T(F): 97 (03 Jul 2019 04:58), Max: 98.2 (02 Jul 2019 22:17)  HR: 92 (03 Jul 2019 04:58) (83 - 97)  BP: 141/65 (03 Jul 2019 04:58) (108/62 - 150/70)  BP(mean): --  RR: 18 (03 Jul 2019 04:58) (17 - 18)  SpO2: 95% (03 Jul 2019 03:42) (95% - 95%)    PHYSICAL EXAM:  Gen: Mild-mod distress due to pain right leg  HEENT: Normocephalic, atraumatic  Neck: supple, no lymphadenopathy  CV: Regular rate & regular rhythm  Lungs: clear to auscultation  Abdomen: Soft, BS present  Ext:       large area of extravasation of blood from attempted iv site left arm.      Right leg erythema with swollen dorsum of right foot, an area of flatulence with surrounding erythematous and tenderness calf of right leg      scar on right shin due to previous I&D       Pt does not allow to examine right leg due to severe pain    Neuro: non focal, awake        TESTS & MEASUREMENTS:                        13.7   18.02 )-----------( 223      ( 03 Jul 2019 07:45 )             43.0     07-03    141  |  100  |  18  ----------------------------<  116<H>  3.8   |  26  |  1.3    Ca    8.6      03 Jul 2019 07:45  Phos  2.9     07-02  Mg     1.6     07-02    TPro  5.8<L>  /  Alb  2.8<L>  /  TBili  1.1  /  DBili  x   /  AST  18  /  ALT  22  /  AlkPhos  109  07-02    eGFR if Non African American: 40 mL/min/1.73M2 (07-03-19 @ 07:45)  eGFR if : 47 mL/min/1.73M2 (07-03-19 @ 07:45)    LIVER FUNCTIONS - ( 02 Jul 2019 04:30 )  Alb: 2.8 g/dL / Pro: 5.8 g/dL / ALK PHOS: 109 U/L / ALT: 22 U/L / AST: 18 U/L / GGT: x               Culture - Blood (collected 07-01-19 @ 14:30)  Source: .Blood Blood  Preliminary Report (07-02-19 @ 22:01):    No growth to date.    Culture - Blood (collected 07-01-19 @ 14:02)  Source: .Blood Blood  Preliminary Report (07-03-19 @ 02:06):    No growth to date.        Blood Gas Venous - Lactate: 1.3 mmoL/L (07-01-19 @ 13:25)  Blood Gas Venous - Lactate: 2.7 mmoL/L (07-01-19 @ 13:06)      INFECTIOUS DISEASES TESTING      RADIOLOGY & ADDITIONAL TESTS:  I have personally reviewed the last Chest xray  CXR      CT      CARDIOLOGY TESTING  12 Lead ECG:   Ventricular Rate 84 BPM    Atrial Rate 84 BPM    P-R Interval 142 ms    QRS Duration 82 ms    Q-T Interval 366 ms    QTC Calculation(Bezet) 432 ms    P Axis 12 degrees    R Axis 9 degrees    T Axis 10 degrees    Diagnosis Line Normal sinus rhythm  Normal ECG    Confirmed by Hiro Allen ([id number]) on 7/3/2019 9:41:20 AM (07-03-19 @ 08:12)      MEDICATIONS  amLODIPine   Tablet 5  ascorbic acid 500  ceFAZolin   IVPB 1000  chlorhexidine 4% Liquid 1  cyanocobalamin 1000  ferrous    sulfate 325  furosemide   Injectable 40  gabapentin 300  metoprolol tartrate 50  montelukast 10  predniSONE   Tablet 10  tacrolimus 0.5      ANTIBIOTICS:  ceFAZolin   IVPB 1000 milliGRAM(s) IV Intermittent every 8 hours

## 2019-07-03 NOTE — CONSULT NOTE ADULT - ASSESSMENT
Infected RLE - likely abscess  Rec I& D - discussed with pt and family at bedside. Consent signed   Elevated INR- transfuse FFP  NPO for OR

## 2019-07-03 NOTE — BRIEF OPERATIVE NOTE - NSICDXBRIEFPROCEDURE_GEN_ALL_CORE_FT
PROCEDURES:  Incision and drainage of deep abscess of lower leg 03-Jul-2019 18:03:02 right lower leg Enrike Kang

## 2019-07-03 NOTE — BRIEF OPERATIVE NOTE - NSICDXBRIEFPREOP_GEN_ALL_CORE_FT
PRE-OP DIAGNOSIS:  Acute abscess of skin or subcutaneous tissue 03-Jul-2019 18:03:29 right lower leg Enrike Kang

## 2019-07-03 NOTE — PROGRESS NOTE ADULT - ASSESSMENT
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  On Examination,    large area of extravasation of blood from attempted iv site left arm.      Right leg erythema with swollen dorsum of right foot, an area of flatulence with surrounding erythematous and tenderness calf of right leg      scar on right shin due to previous I&D       Pt does not allow to examine right leg due to severe pain    Assessment      #) Sepsis 2/2 cellulitis  - in ED WBC=18 from 22 from 15K,   - CT LE: negative for nec fasc  - f/u blood Cx  - c/w Ancef 1g q8h  - will use IV Lasix 40 IV daily for now, monitor daily weights, strict I&O  - Tylenol PRN for fever  - pain control w/ IV Morphine for now  -Consult made with Burn and ID for followup  -NPO- taking to OR today  - 1 unit FFP here and one in OR for reversal of INR due to therapeutic INR.     #) Autoimmune hepatitis   - c/w Prednisone + Tacrolimus, c/w Budesonide - non-formulary, instructed patient to bring own med     #) Prothrombin mutation   - c/w Coumadin 2mg daily, daily INR  -2.4 today    #) HTN - stable, c/w Norvasc +BB    #) Asthma - stable, c/w Singulair    DVT ppx: on Coumadin  Diet: DASH  Activity: AAT  Code status: FULL  Dispo: pending

## 2019-07-03 NOTE — PROGRESS NOTE ADULT - ASSESSMENT
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  On Examination,    large area of extravasation of blood from attempted iv site left arm.      Right leg erythema with swollen dorsum of right foot, an area of flatulence with surrounding erythematous and tenderness calf of right leg      scar on right shin due to previous I&D       Pt does not allow to examine right leg due to severe pain    Assessment      #) Sepsis 2/2 cellulitis  - in ED WBC=18 from 22 from 15K, with hi lactate, and HR  - CT LE: negative for nec fasc  - f/u blood Cx  - c/w Ancef 1g q8h; ADD Vanco NOW- H/O ORSA -Multiple times; ID eval'n pending  - will use IV Lasix 40 IV daily for now, monitor daily weights, strict I&O  - Tylenol PRN for fever  - pain control w/ IV Morphine for now  -Consult made with Burn and ID for followup-- they plan OR debridement TODAY  -NPO- taking to OR today  - 1 unit FFP here and one in OR for reversal of INR due to therapeutic INR. aim for     #) Autoimmune hepatitis   - c/w Prednisone + Tacrolimus, c/w Budesonide - non-formulary, instructed patient to bring own med     #) Prothrombin mutation   - c/w Coumadin 2mg daily, daily INR  -2.4 today--will give FFP preop; change to Lovenox while in hospital; when sugery clears; prophylaxis asap    #) HTN - stable, c/w Norvasc +BB    #) Asthma - stable, c/w Singulair  # IV access already a problem--     consider a PICC or Midline early    DVT ppx: on Coumadin  Diet: DASH  Activity: AAT  Code status: FULL  Dispo: pending 73 yo F with PMH of autoimmune hepatitis, HTN, 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  On Examination,    large area of extravasation of blood from attempted iv site left arm.      Right leg erythema with swollen dorsum of right foot, an area of flatulence with surrounding erythematous and tenderness calf of right leg      scar on right shin due to previous I&D       Pt does not allow to examine right leg due to severe pain    Assessment      #) Sepsis 2/2 cellulitis  - in ED WBC=18 from 22 from 15K, with hi lactate, and HR  - CT LE: negative for nec fasc  - f/u blood Cx  - c/w Ancef 1g q8h; ADD Vanco NOW- H/O ORSA -Multiple times; ID eval'n pending  - will use IV Lasix 40 IV daily for now, monitor daily weights, strict I&O  - Tylenol PRN for fever  - pain control w/ IV Morphine for now  -Consult made with Burn and ID for followup-- they plan OR debridement TODAY  -NPO- taking to OR today  - 1 unit FFP here and one in OR for reversal of INR due to therapeutic INR. aim for     #) Autoimmune hepatitis   - c/w Prednisone + Tacrolimus, c/w Budesonide - non-formulary, instructed patient to bring own med     #) Prothrombin mutation   - c/w Coumadin 2mg daily, daily INR  -2.4 today--will give FFP preop; change to Lovenox while in hospital; when surgery clears; prophylaxis asap    #) HTN - stable, c/w Norvasc +BB    #) Asthma - stable, c/w Singulair  # IV access already a problem--     consider a PICC or Midline early    DVT ppx: on Coumadin  Diet: DASH  Activity: AAT  Code status: FULL  Dispo: pending

## 2019-07-03 NOTE — CONSULT NOTE ADULT - SUBJECTIVE AND OBJECTIVE BOX
HPI:  75 yo F with PMH of autoimmune hepatitis, HTN, 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 Reports symptoms started on Saturday morning and progressively worsened to the point that she was unable to bear weight. Also reports significant pain as well as worsening edema, chills, subjective fevers, and nausea day PTP. Patient reports she hit her L leg on a chair and cut herself on Friday night, though denies any trauma to R side. No insects or tick bites. Denies any drainage or purulence to RLE. Denies any other complaints or symptoms. No rash anywhere else on body. Patient has a history of LE cellulitis that has required Burn debridement in the past. (01 Jul 2019 16:00)    Pt now states that though swelling has decreased pain and skin sensitivity is  unchanged.      PAST MEDICAL & SURGICAL HISTORY:  Autoimmune hepatitis  Other chronic pulmonary embolism without acute cor pulmonale  Essential hypertension  Autoimmune hepatitis treated with steroids  Asthma  DVT (deep venous thrombosis)  S/P debridement  History of back surgery                          13.7   18.02 )-----------( 223      ( 03 Jul 2019 07:45 )             43.0     PT/INR - ( 03 Jul 2019 07:45 )   PT: 28.40 sec;   INR: 2.49 ratio  /PTT - ( 03 Jul 2019 07:45 )  PTT:43.4 sec    EXAM:  Right leg - evidence of decreased swelling ; deep erythema from ankle to mid thigh   exquisite tenderness to palpation  post - medial leg - areas of gray skin discoloration/ necrosis  and fluctuance

## 2019-07-03 NOTE — CONSULT NOTE ADULT - ASSESSMENT
ASSESSMENT  73 yo F with PMH of autoimmune hepatitis, HTN, DVT/PE 2/2 Prothrombin gene mutation on Coumadin, asthma presented to ED complaining of worsening erythema and pain of RLE associated with swelling of dorsum of right foot, excruciating pain right leg. Denies any trauma, insect/mosquito/flies bite. CT right leg showed cellulitis.       Purulent cellulitis with rapid progression of erythema ( fever/surrounding cellulitis/abscess/immunocompromised)  History of ORSA/Fungal pneumonia        PLAN  - Prelim Blood cultures negative  - CT showed < from: CT Lower Extremity No Cont, Right (07.01.19 @ 13:09) >  1. Leg cellulitis without evidence of necrotizing fasciitis  2. Small knee effusion with mild tricompartmental degenerative change  3. Midfoot and hindfoot degenerative change with vascular calcifications   consistent with neuropathic osteoarthropathy      - Pt was febrile on admission with Tmax 101, Leukocytosis initially had Lactate level of 2.7, No evidence of REJI/encephalopathy  - Recommend VA duplex right lower extremity to rule out DVT although unlikely as pt was on coumadin with therapeutic INR  - Repeat Blood cultures  - Burn team to do debridement today, rec abscess cultures   - nares of mrsa  -         *******Attending note to follow ASSESSMENT  75 yo F with PMH of autoimmune hepatitis on tacro/pred, HTN, 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 Denies any trauma, insect/mosquito/flies bite. CT right leg showed cellulitis.     #Purulent cellulitis with rapid progression of erythema ( fever/surrounding cellulitis/abscess/immunocompromised)    Severe sepsis on admission with lactic acidosis P>90 T >101 WBC >12    History of ORSA/Fungal pneumonia    BCX NG  - CT showed < from: CT Lower Extremity No Cont, Right (07.01.19 @ 13:09) >  1. Leg cellulitis without evidence of necrotizing fasciitis  2. Small knee effusion with mild tricompartmental degenerative change  3. Midfoot and hindfoot degenerative change with vascular calcifications consistent with neuropathic osteoarthropathy    RECOMMENDATIONS  - Going to OR  - Vanc 750mg q12h IV  - Please check vanc trough 30 min prior to 4th dose   - Cefepime 1g q12h IV  - Flagyl 500mg q8h IV   - F/u OR cultures

## 2019-07-03 NOTE — CHART NOTE - NSCHARTNOTEFT_GEN_A_CORE
PACU ANESTHESIA ADMISSION NOTE      Procedure: Debridement right lower extrimity  Post op diagnosis:  right lower extrimity abscess    ____  Intubated  TV:______       Rate: ______      FiO2: ______    _x___  Patent Airway    _x___  Full return of protective reflexes    _x___  Full recovery from anesthesia / back to baseline status    Vitals  SPO2:-99% on 2 l nc  HR:-102  RR:-12  B.P:-142/77  TEMP:-98.2    Mental Status:  _x___ Awake   ___x_ Alert   _____ Drowsy   _____ Sedated    Nausea/Vomiting:  _x___  NO       ______Yes,   See Post - Op Orders         Pain Scale (0-10):  __0___    Treatment: _x___ None    __x__ See Post - Op/PCA Orders    Post - Operative Fluids:   ___ Oral   ____x See Post - Op Orders    Plan: Discharge:   ____Home       ___x__Floor     _____Critical Care    _____  Other:_________________    Comments:  Report endorsed to RN   Vitals stable  No anesthesia issues or complications noted.  Discharge to patient to floor when criteria met.

## 2019-07-03 NOTE — CONSULT NOTE ADULT - ATTENDING COMMENTS
Continuing care and surgery discussed with pt; including fact that wound will be left open ,likelihood of serial procedures. Concerns addressed. Also discussed with medical team
I have personally examined the patient and reviewed the documentation above.  Corrections and edits were made wherever needed.

## 2019-07-04 NOTE — PROGRESS NOTE ADULT - ASSESSMENT
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 Denies any trauma, insect/mosquito/flies bite. CT right leg showed cellulitis.   Patient has been out of OR. She complained of Nausea and has been given zofran. IV abx have been changed as per ID's rec. Waiting for her INR to adjust warfarin/levonox.    Severe Sepsis in immunocompromised patient   secondary to RLE cellulitis and abscess  Purulent cellulitis with rapid progression of erythema   -- post debridement by burn team  - await f/u and dressing change  - not sure if need for further debridement  - antibiotics as per ID  - F/u OR cultures   - pain medication as needed  -  Hx of autoimmune hepatitis  - on Tacrolimus and Prednisone  - level on chart    Hx of DVT, IVC, PE, Prothrombin gene mutation  - on Coumadin  - INR noted  - was given FFP for procedure    Patient on Contact Isolation due to hx of MRSA      Surgical Note:   fat necrosis and purulent drainage--> excision to and including fascia      #) HTN - stable, c/w Norvasc +BB    #) Asthma - stable, c/w Singulair    # IV access already a problem--     consider a PICC or Midline early    DVT ppx: on Coumadin  Diet: DASH  Activity: AAT  Code status: FULL

## 2019-07-04 NOTE — PROGRESS NOTE ADULT - ASSESSMENT
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 Denies any trauma, insect/mosquito/flies bite. CT right leg showed cellulitis.     Severe Sepsis in immunocompromised patient secondary to RLE cellulitis and abscess  Purulent cellulitis with rapid progression of erythema   - patient had similar infection in contralateral extremity last year requiring multiple debridements, prolonged hospital course and SNF stay    - post debridement by burn team  - await f/u and dressing change  - ? need for further debridement  - antibiotics as per ID  - F/u OR cultures   - pain medication as needed  - pain level much less    Hx of autoimmune hepatitis  - on Tacrolimus and Prednisone  - level on chart    Hx of DVT, IVC, PE, Prothrombin gene mutation  - on Coumadin  - INR noted  - was given FFP for procedure    Patient on Contact Isolation due to hx of MRSA

## 2019-07-04 NOTE — PROGRESS NOTE ADULT - ASSESSMENT
A/P: POD 1 s/p jamey right leg abscesses    cont wound care: Santyl/Wet [place] with 1/4 Dakins/DPD BID  Cont IV antibx  DVT GI Prophylaxis  Pain control  OT/PT  f/u cx

## 2019-07-05 NOTE — PROGRESS NOTE ADULT - ASSESSMENT
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 Denies any trauma, insect/mosquito/flies bite. CT right leg showed cellulitis.    IV abx have been changed as per ID's rec. Waiting for her INR to adjust warfarin/levonox and is held at the moment (2.9) today.    Severe Sepsis in immunocompromised patient   secondary to RLE cellulitis and abscess  Purulent cellulitis with rapid progression of erythema   -- post debridement by burn team  - await f/u and dressing change  - post debridement by burn team, wound still necrotic as per burn description  - local care with Santyl and Dakins  - further debridement next week?  - antibiotics as per ID, Vanco, Cefepime and Flagyl  - F/u OR cultures , some showing Staph aureus   - pain medication as needed  - attempt OOB to chair with leg elevation  - Vanco trough pending    Hypokalemia  -Potassium replacement      #Nausea  Protonix started  -IV fluids stopped-flush NS and lock      - antibiotics as per ID  - F/u OR cultures   - pain medication as needed  -  Hx of autoimmune hepatitis  - on Tacrolimus and Prednisone  - level on chart    Hx of DVT, IVC, PE, Prothrombin gene mutation  - on Coumadin  - INR noted  - was given FFP for procedure    Patient on Contact Isolation due to hx of MRSA      Surgical Note:   fat necrosis and purulent drainage--> excision to and including fascia      #) HTN - stable, c/w Norvasc +BB    #) Asthma - stable, c/w Singulair    # IV access already a problem--     consider a PICC or Midline early    DVT ppx: on Coumadin, held at the moment  Diet: DASH  Activity: AAT  Code status: FULL 75 yo F with PMH of autoimmune hepatitis on tacro/pred, HTN, 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 Denies any trauma, insect/mosquito/flies bite. CT right leg showed cellulitis.    IV abx have been changed as per ID's rec. Waiting for her INR to adjust warfarin/levonox and is held at the moment (2.9) today. b12 deficiency reported, pamela discuss.     Severe Sepsis in immunocompromised patient   secondary to RLE cellulitis and abscess  Purulent cellulitis with rapid progression of erythema   -- post debridement by burn team  - await f/u and dressing change  - post debridement by burn team, wound still necrotic as per burn description  - local care with Santyl and Dakins  - further debridement next week?  - antibiotics as per ID, Vanco, Cefepime and Flagyl  - F/u OR cultures , some showing Staph aureus   - pain medication as needed  - attempt OOB to chair with leg elevation  - Vanco trough pending    Hypokalemia  -Potassium replacement      #Nausea  Protonix started  -IV fluids stopped-flush NS and lock      - antibiotics as per ID  - F/u OR cultures   - pain medication as needed  -  Hx of autoimmune hepatitis  - on Tacrolimus and Prednisone  - level on chart    Hx of DVT, IVC, PE, Prothrombin gene mutation  - on Coumadin  - INR noted  - was given FFP for procedure    Patient on Contact Isolation due to hx of MRSA      Surgical Note:   fat necrosis and purulent drainage--> excision to and including fascia      #) HTN - stable, c/w Norvasc +BB    #) Asthma - stable, c/w Singulair    # IV access already a problem--     consider a PICC or Midline early    DVT ppx: on Coumadin, held at the moment  Diet: DASH  Activity: AAT  Code status: FULL

## 2019-07-05 NOTE — PROGRESS NOTE ADULT - ASSESSMENT
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 Denies any trauma, insect/mosquito/flies bite. CT right leg showed cellulitis.     #RLE Purulent cellulitis/abscess     s/p OR 7/3, WCX NG    Severe sepsis on admission with lactic acidosis P>90 T >101 WBC >12    History of ORSA    BCX NG    CT showed < from: CT Lower Extremity No Cont, Right (07.01.19 @ 13:09) >1. Leg cellulitis without evidence of necrotizing fasciitis2. Small knee effusion with mild tricompartmental degenerative change 3. Midfoot and hindfoot degenerative change with vascular calcifications consistent with neuropathic osteoarthropathy  #Obesity BMI 33    RECOMMENDATIONS  - Vanc 750mg q12h IV  - Please check vanc trough   - Cefepime 1g q12h IV  - Flagyl 500mg q8h IV   - F/u OR cultures   - on steroids

## 2019-07-05 NOTE — PROGRESS NOTE ADULT - ASSESSMENT
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 Denies any trauma, insect/mosquito/flies bite. CT right leg showed cellulitis.     Severe Sepsis in immunocompromised patient secondary to RLE cellulitis and abscess  Purulent cellulitis with rapid progression of erythema   - patient had similar infection in contralateral extremity last year requiring multiple debridements, prolonged hospital course and SNF stay    - post debridement by burn team, wound still necrotic as per burn description  - local care with Santyl and Dakins  - further debridement next week?  - antibiotics as per ID, Vanco, Cefepime and Flagyl  - F/u OR cultures , some showing Staph aureus   - pain medication as needed  - attempt OOB to chair with leg elevation  - Vanco trough pending    Nausea  - given Zofran  - add Protonix  - monitor    Hx of autoimmune hepatitis  - on Tacrolimus and Prednisone  - level on chart    Hx of DVT, IVC, PE, Prothrombin gene mutation  - Coumadin held  - INR noted, 2.92  - was given FFP for procedure    Wheeze noted on exam  - receiving RL at 100cc/hr > heplock  - observe    Patient on Contact Isolation due to hx of MRSA

## 2019-07-06 NOTE — PROGRESS NOTE ADULT - ASSESSMENT
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 Denies any trauma, insect/mosquito/flies bite. CT right leg showed cellulitis.    IV abx have been changed as per ID's rec. Waiting for her INR to adjust warfarin/levonox and is held at the moment. b12 deficiency reported, pamela discuss.     Severe Sepsis in immunocompromised patient   secondary to RLE cellulitis and abscess  Purulent cellulitis with rapid progression of erythema   -- post debridement by burn team  - await f/u and dressing change  - post debridement by burn team, wound still necrotic as per burn description  - local care with Santyl and Dakins  - further debridement next week?  - antibiotics as per ID, Vanco, Cefepime and Flagyl  - F/u OR cultures  showing Staph aureus   - pain medication as needed    Hypokalemia  -Potassium replacement continued      #Nausea  Protonix started  -IV fluids stopped-flush NS and lock as instructed      - antibiotics as per ID  - F/u OR cultures   - pain medication as needed  -  Hx of autoimmune hepatitis  - on Tacrolimus and Prednisone  - level on chart    Hx of DVT, IVC, PE, Prothrombin gene mutation  - on Coumadin, held, might need relook on momday  - INR noted  - was given FFP for procedure    Patient on Contact Isolation due to hx of MRSA      Surgical Note:   fat necrosis and purulent drainage--> excision to and including fascia      #) HTN - stable, c/w Norvasc +BB    #) Asthma - stable, c/w Singulair      DVT ppx: on Coumadin, held at the moment  Diet: DASH  Activity: AAT  Code status: FULL

## 2019-07-06 NOTE — PROGRESS NOTE ADULT - ASSESSMENT
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 Denies any trauma, insect/mosquito/flies bite. CT right leg showed cellulitis.     Severe Sepsis in immunocompromised patient secondary to RLE cellulitis and abscess  Purulent cellulitis with rapid progression of erythema   - patient had similar infection in contralateral extremity last year requiring multiple debridements, prolonged hospital course and SNF stay    - post debridement by burn team  - local care with Santyl and Dakins  - cultures + MRSA,   - further debridement next week?  - antibiotics as per ID, Vanco, Cefepime and Flagyl  - OR cultures showing Staph aureus, Alpha hemomolytic strep and Enterococcus faecalis  - pain medication as needed  - attempt OOB to chair with leg elevation, patient does not tolerate the leg in the dependent position  - recall burn team given increase in pain? induration?    Nausea - resolved  - Zofran prn  - continue Protonix  - monitor    Hx of autoimmune hepatitis  - on Tacrolimus and Prednisone  - level on chart    Hx of DVT, IVC, PE, Prothrombin gene mutation  - Coumadin held  - INR not done today...  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 Denies any trauma, insect/mosquito/flies bite. CT right leg showed cellulitis.     Severe Sepsis in immunocompromised patient secondary to RLE cellulitis and abscess  Purulent cellulitis with rapid progression of erythema   - patient had similar infection in contralateral extremity last year requiring multiple debridements, prolonged hospital course and SNF stay    - post debridement by burn team  - local care with Santyl and Dakins  - cultures + MRSA,   - further debridement next week?  - antibiotics as per ID, Vanco, Cefepime and Flagyl  - OR cultures showing Staph aureus, Alpha hemomolytic strep and Enterococcus faecalis  - pain medication as needed  - attempt OOB to chair with leg elevation, patient does not tolerate the leg in the dependent position  - recall burn team given increase in pain? induration?    Nausea - resolved  - Zofran prn  - continue Protonix  - monitor    Hx of autoimmune hepatitis  - on Tacrolimus and Prednisone  - level on chart    Hx of DVT, IVC, PE, Prothrombin gene mutation  - Coumadin held  - INR not done today...ADDED to PTT done at 8 am    Wheeze - resolved  - maintain off IVF for now  - observe    Patient on Contact Isolation due to MRSA  Nutritional Support  Prognosis guarded

## 2019-07-07 NOTE — PROGRESS NOTE ADULT - ASSESSMENT
A/P: s/p jamey of rt leg wounds    cont wound care: wet to dry gauze packing with 1/4 Dakins / DPD BID  Cont IV antibx  DVT GI Prophylaxis  Pain control  OT/PT  Elevation

## 2019-07-07 NOTE — PROGRESS NOTE ADULT - ASSESSMENT
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 Denies any trauma, insect/mosquito/flies bite. CT right leg showed cellulitis.     Severe Sepsis in immunocompromised patient secondary to RLE cellulitis and abscess  Purulent cellulitis with rapid progression of erythema   - patient had similar infection in contralateral extremity last year requiring multiple debridements, prolonged hospital course and SNF stay    - post debridement by burn team  - local care with Santyl and Dakins  - cultures + MRSA,   - further debridement next week?  - antibiotics as per ID, Vanco, Cefepime and Flagyl  - OR cultures showing Staph aureus, Alpha hemomolytic strep and Enterococcus faecalis  - pain medication as needed  - attempt OOB to chair with leg elevation, patient does not tolerate the leg in the dependent position  - recall burn team given increase in pain? induration?    Nausea   - Zofran prn  - continue Protonix  - monitor    Hypokalemia  - additional supplement needed today  - increase daily supplement to 40 meq while on Lasix 40 IVP    Hx of autoimmune hepatitis  - on Tacrolimus and Prednisone  - level on chart    Hx of DVT, IVC, PE, Prothrombin gene mutation  - Coumadin held  - INR trending down  - INR 2.08  - give one time dose today    Patient on Contact Isolation due to MRSA  Nutritional Support  Prognosis guarded

## 2019-07-08 NOTE — DIETITIAN INITIAL EVALUATION ADULT. - FACTORS AFF FOOD INTAKE
Pt reports decrease in appetite and PO intake r/t nausea and food preferences. Pt is agreeable to PO supplement once daily. Likes soup during LOS. NKFA. PTA pt supplements vit b12, vit C, FeSO4. Denies chew/swallow difficulty. LBM 7/5- denies GI distress.

## 2019-07-08 NOTE — PROGRESS NOTE ADULT - ASSESSMENT
abscesses right lower leg post debridement and drainage=--> MRSA    rec: soap and water qd, iv abx, ns wet to dry dressing change bid    further debridement in a m

## 2019-07-08 NOTE — PROGRESS NOTE ADULT - ASSESSMENT
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 Denies any trauma, insect/mosquito/flies bite. CT right leg showed cellulitis.   Today INR is 1.8, coumadin started one dose today.      Severe Sepsis in immunocompromised patient   secondary to RLE cellulitis and abscess  Purulent cellulitis with rapid progression of erythema   -- post debridement by burn team  - post debridement by burn team, wound still necrotic as per burn description  - local care with Santyl and Dakins  - further debridement not indicated yet     - antibiotics as per ID, Vanco, Cefepime and Flagyl. IV line access is becoming difficult, might need a PICC line or a midline  - F/u OR cultures  showing Staph aureus   - pain medication as needed    Hypokalemia  -Potassium replacement continued  -resolved    #Nausea  Protonix started  -IV fluids stopped  -flush NS and lock as instructed      Hx of autoimmune hepatitis  - on Tacrolimus and Prednisone  - level on chart    Hx of DVT, IVC, PE, Prothrombin gene mutation  - on Coumadin, held, might need relook on momday  - INR noted  - was given FFP for procedure    Patient on Contact Isolation due to hx of MRSA      Surgical Note:   fat necrosis and purulent drainage--> excision to and including fascia      #) HTN - stable, c/w Norvasc +BB    #) Asthma - stable, c/w Singulair      DVT ppx: on Coumadin  Diet: DASH  Activity: AAT  Code status: FULL 73 yo F with PMH of autoimmune hepatitis on tacro/pred, HTN, 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 Denies any trauma, insect/mosquito/flies bite. CT right leg showed cellulitis.   Today INR is 1.8, coumadin started one dose today.      Severe Sepsis in immunocompromised patient   secondary to RLE cellulitis and abscess  Purulent cellulitis with rapid progression of erythema   -- post debridement by burn team  - post debridement by burn team, wound still necrotic as per burn description  - local care with Santyl and Dakins  - further debridement not indicated yet    Rehab consulted for help with ambulation    - antibiotics as per ID, Vanco, Cefepime and Flagyl. IV line access is becoming difficult, might need a PICC line or a midline  - F/u OR cultures  showing Staph aureus   - pain medication as needed    Hypokalemia  -Potassium replacement continued  -resolved    #Nausea  Protonix started  -IV fluids stopped  -flush NS and lock as instructed      Hx of autoimmune hepatitis  - on Tacrolimus and Prednisone  - level on chart    Hx of DVT, IVC, PE, Prothrombin gene mutation  - on Coumadin, held, might need relook on momday  - INR noted  - was given FFP for procedure    Patient on Contact Isolation due to hx of MRSA      Surgical Note:   fat necrosis and purulent drainage--> excision to and including fascia      #) HTN - stable, c/w Norvasc +BB    #) Asthma - stable, c/w Singulair      DVT ppx: on Coumadin  Diet: DASH  Activity: AAT  Code status: FULL 73 yo F with PMH of autoimmune hepatitis on tacro/pred, HTN, 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 Denies any trauma, insect/mosquito/flies bite. CT right leg showed cellulitis.   Today INR is 1.8, coumadin started one dose today.      Severe Sepsis in immunocompromised patient   secondary to RLE cellulitis and abscess  Purulent cellulitis with rapid progression of erythema   -- post debridement by burn team  - post debridement by burn team, wound still necrotic as per burn description  - local care with Santyl and Dakins  - further debridement indicated tomorrow, labs ordered, 2 prbcs to he held pending 4pm active type and screen, NPO after midnight   Rehab consulted for help with ambulation    - antibiotics as per ID, Vanco, Cefepime and Flagyl. IV line access is becoming difficult, might need a PICC line or a midline  - F/u OR cultures  showing Staph aureus   - pain medication as needed    Hypokalemia  -Potassium replacement continued  -resolved    #Nausea  Protonix started  -IV fluids stopped  -flush NS and lock as instructed      Hx of autoimmune hepatitis  - on Tacrolimus and Prednisone  - level on chart    Hx of DVT, IVC, PE, Prothrombin gene mutation  - on Coumadin, held, might need relook on momday  - INR noted  - was given FFP for procedure    Patient on Contact Isolation due to hx of MRSA      Surgical Note:   fat necrosis and purulent drainage--> excision to and including fascia      #) HTN - stable, c/w Norvasc +BB    #) Asthma - stable, c/w Singulair      DVT ppx: on Coumadin  Diet: DASH  Activity: AAT  Code status: FULL

## 2019-07-08 NOTE — PROGRESS NOTE ADULT - ASSESSMENT
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 Denies any trauma, insect/mosquito/flies bite. CT right leg showed cellulitis.     #RLE Purulent cellulitis/abscess     s/p OR 7/3, WCX MRSA     Severe sepsis on admission with lactic acidosis P>90 T >101 WBC >12    BCX NG    CT showed < from: CT Lower Extremity No Cont, Right (07.01.19 @ 13:09) >1. Leg cellulitis without evidence of necrotizing fasciitis2. Small knee effusion with mild tricompartmental degenerative change 3. Midfoot and hindfoot degenerative change with vascular calcifications consistent with neuropathic osteoarthropathy  #Obesity BMI 33    RECOMMENDATIONS  - Vanc 750mg q12h IV Vancomycin Level, Trough: 14.4:  (07.06.19 @ 08:23)  - When stable for D/C will plan for PO bactrim 1 DS tab PO BID to complete total 10 days   - D/C cefepime/flagyl as all cultures with MRSA   - on immunosuppression

## 2019-07-08 NOTE — DIETITIAN INITIAL EVALUATION ADULT. - RD TO REMAIN AVAILABLE
yes/Pt at risk, f/u 3 days. RD to monitor diet order, energy intake, NFPF, body comp, glucose and renal profile

## 2019-07-08 NOTE — PROGRESS NOTE ADULT - ASSESSMENT
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    Assessment & PLAN:      #) Sepsis 2/2 cellulitis  - in ED WBC=18 from 22 from 15K, with hi lactate, and HR  - CT LE: negative for nec fasc  - f/u blood Cx  - ID Plan for Vanco, cefepime & Flagyl- see Vanco level;        ID F/U and BURN-  -  Case discussed with Burn- wound carte for now, attending to f/u w/in 24 hrs for any further surgical plans;  - will use IV Lasix 40 IV daily for now, monitor daily weights, strict I&O  - Tylenol PRN for fever  - pain control w/ IV Morphine for now- watch for constipation  -Consults made with Burn and ID for followup--   - IV access a problem - consider Midline      #) Autoimmune hepatitis   - c/w Prednisone + Tacrolimus, c/w Budesonide - non-formulary, instructed patient to bring own med     #) Prothrombin mutation   - c/w Coumadin 2mg daily, daily INR  -2.4 today--will give FFP preop; change to Lovenox while in hospital; when surgery clears- then fully AC again    #) HTN - stable, c/w Norvasc +BB    #) Asthma - stable, c/w Singulair  # IV access already a problem--     consider a PICC or Midline early- now it's needed    DVT ppx: on AC Rx  Diet: DASH  Activity: AAT- limited 2/2 pain; have rehab see & evaluate  Code status: FULL  Dispo: pending

## 2019-07-08 NOTE — DIETITIAN INITIAL EVALUATION ADULT. - PHYSICAL APPEARANCE
obese/BMI: 33.0 using dosing wt. IBW: 120#+/-10%. UBW unknown, however, pt reports feeling swollen and states dosing wt likely inaccurate. Edema 2+, b/lle cellulitis, s/p I+D.

## 2019-07-08 NOTE — DIETITIAN INITIAL EVALUATION ADULT. - PERTINENT MEDS FT
coumadin, IV abx, KCl, protonix, norvasc, vit C, vitb12, FeSO4, lopressor, lasix, morphine, zofran, prednisone, tacrolimus,

## 2019-07-08 NOTE — DIETITIAN INITIAL EVALUATION ADULT. - OTHER INFO
Pt presented to ED with chief complaint worsening erythema of RLE, pain, worsening edema, chills, subjective fevers and nausea. Pt with hx of cellulitis s/p debridement in past. Pt dx severe sepsis in immunocompromised pt 2/2 RLE cellulitis and abscess. Hypokalemia noted and resolved. Nausea persists. Note hx of autoimmune hepatitis.

## 2019-07-08 NOTE — CONSULT NOTE ADULT - ASSESSMENT
IMPRESSION: Rehab of gait ab RLE Cellulitis/abscess    PRECAUTIONS: [    ] Cardiac  [    ] Respiratory  [    ] Seizures [   x ] Contact Isolation  [    ] Droplet Isolation  [    ] Other    Weight Bearing Status: WBAT    RECOMMENDATION:    Out of Bed to Chair     DVT/Decubiti Prophylaxis    REHAB PLAN:     [   x  ] Bedside P/T 3-5 times a week   [     ] Bedside O/T  2-3 times a week   [     ] No Rehab Therapy Indicated   [     ]  Speech Therapy   Conditioning/ROM                                 ADL  Bed Mobility                                            Conditioning/ROM  Transfers                                                  Bed Mobility  Sitting /Standing Balance                      Transfers                                        Gait Training                                            Sitting/Standing Balance  Stair Training [   ]Applicable                 Home equipment Eval                                                                     Splinting  [   ] Only      GOALS:   ADL   [ x   ]   Independent         Transfers  [   x ] Independent            Ambulation  [ x    ] Independent     [  x   ] With device                            [    ]  CG                                               [    ]  CG                                                    [     ] CG                            [    ] Min A                                          [    ] Min A                                                [     ] Min  A          DISCHARGE PLAN:   [     ]  Good candidate for Intensive Rehabilitation/Hospital based                                             Will tolerate 3hrs Intensive Rehab Daily                                       [      ]  Short Term Rehab in Skilled Nursing Facility                                       [     x ]  Home with Outpatient or  services                                         [      ]  Possible Candidate for Intensive Hospital based Rehab

## 2019-07-08 NOTE — CONSULT NOTE ADULT - SUBJECTIVE AND OBJECTIVE BOX
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 Reports symptoms started on Saturday morning and progressively worsened. Also reports significant pain as well as worsening edema, chills, subjective fevers, and nausea day PTP. Patient reports she hit her L leg on a chair and cut herself on Friday night, though denies any trauma to R side. No insects or tick bites. Denies any drainage or purulence to RLE. Denies any other complaints or symptoms. No rash anywhere else on body. Patient has a history of LE cellulitis that has required Burn debridement in the past. (01 Jul 2019 16:00)      PAST MEDICAL & SURGICAL HISTORY:  Autoimmune hepatitis  Other chronic pulmonary embolism without acute cor pulmonale  Essential hypertension  Autoimmune hepatitis treated with steroids  Asthma  DVT (deep venous thrombosis)  S/P debridement  History of back surgery      Hospital Course: Severe Sepsis in immunocompromised patient   secondary to RLE cellulitis and abscess  Purulent cellulitis with rapid progression of erythema   -- post debridement by burn team  - post debridement by burn team, wound still necrotic as per burn description  - local care with Santyl and Dakins  - further debridement indicated tomorrow, labs ordered, 2 prbcs to he held pending 4pm active type and screen, NPO after midnight   Rehab consulted for help with ambulation    - antibiotics as per ID, Vanco, Cefepime and Flagyl. IV line access is becoming difficult, might need a PICC line or a midline  - F/u OR cultures  showing Staph aureus   - pain medication as needed    Hypokalemia  -Potassium replacement continued  -resolved    #Nausea  Protonix started  -IV fluids stopped  -flush NS and lock as instructed      Hx of autoimmune hepatitis  - on Tacrolimus and Prednisone  - level on chart    Hx of DVT, IVC, PE, Prothrombin gene mutation  - on Coumadin, held, might need relook on momday  - INR noted  - was given FFP for procedure    Patient on Contact Isolation due to hx of MRSA    TODAY'S SUBJECTIVE & REVIEW OF SYMPTOMS:     Constitutional WNL   Cardio WNL   Resp WNL   GI WNL  Heme WNL  Endo WNL  Skin WNL  MSK Pain RLE  Neuro WNL  Cognitive WNL  Psych WNL      MEDICATIONS  (STANDING):  amLODIPine   Tablet 5 milliGRAM(s) Oral daily  ascorbic acid 500 milliGRAM(s) Oral daily  cefepime   IVPB 1000 milliGRAM(s) IV Intermittent every 12 hours  chlorhexidine 4% Liquid 1 Application(s) Topical <User Schedule>  cyanocobalamin 1000 MICROGram(s) Oral daily  ferrous    sulfate 325 milliGRAM(s) Oral daily  furosemide   Injectable 40 milliGRAM(s) IV Push daily  gabapentin 300 milliGRAM(s) Oral three times a day  metoprolol tartrate 50 milliGRAM(s) Oral two times a day  metroNIDAZOLE  IVPB 500 milliGRAM(s) IV Intermittent every 8 hours  montelukast 10 milliGRAM(s) Oral at bedtime  pantoprazole    Tablet 40 milliGRAM(s) Oral before breakfast  potassium chloride    Tablet ER 40 milliEquivalent(s) Oral daily  predniSONE   Tablet 10 milliGRAM(s) Oral daily  sodium chloride 0.9% lock flush 3 milliLiter(s) IV Push every 8 hours  tacrolimus 0.5 milliGRAM(s) Oral every 12 hours  vancomycin  IVPB 750 milliGRAM(s) IV Intermittent every 12 hours    MEDICATIONS  (PRN):  acetaminophen   Tablet .. 650 milliGRAM(s) Oral every 4 hours PRN Mild Pain (1 - 3)  acetaminophen   Tablet .. 650 milliGRAM(s) Oral every 6 hours PRN Temp greater or equal to 38.5C (101.3F)  morphine  - Injectable 2 milliGRAM(s) IV Push every 3 hours PRN Severe Pain (7 - 10)  ondansetron    Tablet 4 milliGRAM(s) Oral every 8 hours PRN Nausea and/or Vomiting      FAMILY HISTORY:  No pertinent family history in first degree relatives      Allergies    No Known Allergies    Intolerances        SOCIAL HISTORY:    [    ] Etoh  [    ] Smoking  [    ] Substance abuse     Home Environment:  [    ] Home Alone  [   x ] Lives with Family SPOUSE  [    ] Home Health Aid    Dwelling:  [    ] Apartment  [ x   ] Private House  [    ] Adult Home  [    ] Skilled Nursing Facility      [    ] Short Term  [    ] Long Term  [  x  ] Stairs      3 OUTSIDE                     [    ] Elevator     FUNCTIONAL STATUS PTA: (Check all that apply)  Ambulation: [x     ]Independent    [    ] Dependent     [    ] Non-Ambulatory  Assistive Device: [  x  ] SA Cane  [    ]  Q Cane  [  x  ] Walker  [    ]  Wheelchair  ADL : [  x  ] Independent  [    ]  Dependent       Vital Signs Last 24 Hrs  T(C): 36.6 (08 Jul 2019 12:25), Max: 36.6 (08 Jul 2019 12:25)  T(F): 97.9 (08 Jul 2019 12:25), Max: 97.9 (08 Jul 2019 12:25)  HR: 77 (08 Jul 2019 12:25) (73 - 80)  BP: 135/75 (08 Jul 2019 12:25) (117/84 - 139/79)  BP(mean): --  RR: 20 (08 Jul 2019 12:25) (20 - 20)  SpO2: --      PHYSICAL EXAM: Alert & Oriented X3  GENERAL: NAD, well-groomed, well-developed  HEAD:  Atraumatic, Normocephalic  EYES: EOMI, PERRLA, conjunctiva and sclera clear  NECK: Supple  CHEST/LUNG: Clear bilaterally  HEART: Regular rate and rhythm  ABDOMEN: Soft, Nontender, Nondistended; Bowel sounds present  EXTREMITIES: RLE bandaged with Ace, L medial calf bandaged    NERVOUS SYSTEM:  Cranial Nerves 2-12 intact [  x  ] Abnormal  [    ]  ROM: WFL all extremities [ x   ]  Abnormal [     ]  Motor Strength: WFL all extremities  [  x  ]  Abnormal [    ]Pain on resisted R plantarflexion  Sensation: intact to light touch [  x  ] Abnormal [    ]      FUNCTIONAL STATUS:  Bed Mobility: [   ]  Independent [    ]  Supervision [ x   ]  Needs Assistance [  ]  N/A  Transfers: [    ]  Independent [    ]  Supervision [  x  ]  Needs Assistance [    ]  N/A    Ambulation:  [    ]  Independent [    ]  Supervision [ x   ]  Needs Assistance [    ]  N/A   ADL:  [    ]   Independent [    ] Requires Assistance [  x  ] N/A       LABS:                        12.8   9.76  )-----------( 300      ( 08 Jul 2019 07:03 )             40.1     07-08    142  |  101  |  18  ----------------------------<  115<H>  3.7   |  28  |  1.2    Ca    8.5      08 Jul 2019 07:03      PT/INR - ( 08 Jul 2019 07:03 )   PT: 20.70 sec;   INR: 1.81 ratio               RADIOLOGY & ADDITIONAL STUDIES:

## 2019-07-09 NOTE — PROGRESS NOTE ADULT - ASSESSMENT
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 Denies any trauma, insect/mosquito/flies bite. CT right leg showed cellulitis.     Severe Sepsis in immunocompromised patient secondary to RLE cellulitis and abscess  Purulent cellulitis with rapid progression of erythema   - patient had similar infection in contralateral extremity last year requiring multiple debridements, prolonged hospital course and SNF stay    - post debridement by burn team again today  - local care with Santyl and Dakins  - cultures + MRSA  - antibiotics as per ID,  IV Vanco continues, Cefepime and Flagyl were discontinued  - pain medication as needed  - attempt OOB to chair with leg elevation, patient does not tolerate the leg in the dependent position  - PT to work with patient in am    Nausea   - Zofran prn  - continue Protonix  - monitor    Hypokalemia  - resolved    Hx of autoimmune hepatitis  - on Tacrolimus and Prednisone  - level on chart    Hx of DVT, IVC, PE, Prothrombin gene mutation  - INR 2.02      Patient on Contact Isolation due to MRSA  Nutritional Support  Prognosis guarded, Discharge planning as per burn

## 2019-07-09 NOTE — CHART NOTE - NSCHARTNOTEFT_GEN_A_CORE
PACU ANESTHESIA ADMISSION NOTE      Procedure: Incision and drainage of deep abscess of lower leg: right lower leg    Post op diagnosis:  Abscess of skin      ____  Intubated  TV:______       Rate: ______      FiO2: ______    ___x_  Patent Airway    _x___  Full return of protective reflexes    _x___  Full recovery from anesthesia / back to baseline     Vitals:   T:  97.4         R:16                  BP:       151/72           Sat:    99               P: 70      Mental Status:  ____ Awake   ___x__ Alert   _____ Drowsy   _____ Sedated    Nausea/Vomiting:  ___x_ NO  ______Yes,   See Post - Op Orders          Pain Scale (0-10):  _____    Treatment: ____ None    __x__ See Post - Op/PCA Orders    Post - Operative Fluids:   ____ Oral   ___x_ See Post - Op Orders    Plan: Discharge:   ____Home       ___x__Floor     _____Critical Care    _____  Other:_________________    Comments:

## 2019-07-09 NOTE — BRIEF OPERATIVE NOTE - NSICDXBRIEFPROCEDURE_GEN_ALL_CORE_FT
PROCEDURES:  Irrigation and excisional debridement of tissue including subcutaneous tissue 09-Jul-2019 08:59:55 excisionaldebridement right lower leg Enrike Kang  Incision and drainage of deep abscess of lower leg 03-Jul-2019 18:03:02 right lower leg Enrike Kang

## 2019-07-09 NOTE — PRE-OP CHECKLIST - AS BP NONINV METHOD
Received dnr in medical release for processing. Need new order made. This one was signed in hospital.   electronic

## 2019-07-09 NOTE — CHART NOTE - NSCHARTNOTEFT_GEN_A_CORE
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 Denies any trauma, insect/mosquito/flies bite. CT right leg showed cellulitis.   Monitor INR and dose coumadin      Severe Sepsis in immunocompromised patient   secondary to RLE cellulitis and abscess  Purulent cellulitis with rapid progression of erythema   - post debridement by burn team, wound still necrotic as per burn description , pt went for debridement again , pt will transferred to Burn ICU for wound management  - local care per burn   Rehab consulted for help with ambulation    - antibiotics as per Jorge BARKER for now , d/c cefepime and flagyl  - can be discharged on oral bactrim to complete course of 10 days  - F/u OR cultures  showing Staph aureus   - pain medication as needed    Hypokalemia  -Potassium replacement continued  -resolved    #Nausea  - PRN zofran    Hx of autoimmune hepatitis  - on Tacrolimus and Prednisone continue the current dosing  - level on chart    Hx of DVT, IVC, PE, Prothrombin gene mutation  - on Coumadin, will f/u with burn regarding restarting it ,   - INR noted  - was given FFP for procedure    Patient on Contact Isolation due to hx of MRSA        #) HTN - stable, c/w Norvasc +BB    #) Asthma - stable, c/w Singulair      DVT ppx: on Coumadin  Diet: DASH  Activity: AAT  Code status: FULL

## 2019-07-09 NOTE — PROGRESS NOTE ADULT - ASSESSMENT
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 Denies any trauma, insect/mosquito/flies bite. CT right leg showed cellulitis.   Monitor INR and dose coumadin      Severe Sepsis in immunocompromised patient   secondary to RLE cellulitis and abscess  Purulent cellulitis with rapid progression of erythema   - post debridement by burn team, wound still necrotic as per burn description , pt went for debridement again  [facility]   Rehab consulted for help with ambulation    - antibiotics as per ID, Vanco, Cefepime and Flagyl  - can be discharged on oral bactrim to complete course of 10 days  - F/u OR cultures  showing Staph aureus   - pain medication as needed    Hypokalemia  -Potassium replacement continued  -resolved    #Nausea  - PRN zofran    Hx of autoimmune hepatitis  - on Tacrolimus and Prednisone  - level on chart    Hx of DVT, IVC, PE, Prothrombin gene mutation  - on Coumadin, will f/u with burn regarding restarting it  - INR noted  - was given FFP for procedure    Patient on Contact Isolation due to hx of MRSA        #) HTN - stable, c/w Norvasc +BB    #) Asthma - stable, c/w Singulair      DVT ppx: on Coumadin  Diet: DASH  Activity: AAT  Code status: FULL 75 yo F with PMH of autoimmune hepatitis on tacro/pred, HTN, 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 Denies any trauma, insect/mosquito/flies bite. CT right leg showed cellulitis.   Monitor INR and dose coumadin      Severe Sepsis in immunocompromised patient   secondary to RLE cellulitis and abscess  Purulent cellulitis with rapid progression of erythema   - post debridement by burn team, wound still necrotic as per burn description , pt went for debridement again  - local care with Santyl and Dakins   Rehab consulted for help with ambulation    - antibiotics as per ID, Jorge for now , d/c cefepime and flagyl  - can be discharged on oral bactrim to complete course of 10 days  - F/u OR cultures  showing Staph aureus   - pain medication as needed    Hypokalemia  -Potassium replacement continued  -resolved    #Nausea  - PRN zofran    Hx of autoimmune hepatitis  - on Tacrolimus and Prednisone  - level on chart    Hx of DVT, IVC, PE, Prothrombin gene mutation  - on Coumadin, will f/u with burn regarding restarting it  - INR noted  - was given FFP for procedure    Patient on Contact Isolation due to hx of MRSA        #) HTN - stable, c/w Norvasc +BB    #) Asthma - stable, c/w Singulair      DVT ppx: on Coumadin  Diet: DASH  Activity: AAT  Code status: FULL

## 2019-07-09 NOTE — PHYSICAL THERAPY INITIAL EVALUATION ADULT - SPECIFY REASON(S)
Pt currently at debridement procedure, as per Resident pt will check in with burn to determine if pt is clear for PT. Resident requests PT follow up tomorrow.

## 2019-07-10 NOTE — DISCHARGE NOTE PROVIDER - NSDCFUADDINST_GEN_ALL_CORE_FT
Wound care to be performed twice daily. OK to bathe with wound care. Wash wounds with warm, soapy water and a wash cloth.  Dress open areas to the right lower extremity with Santyl/Dakins wet to dry dressing. Secure in place with kerlix and ACE wrap. Continue all home meds as prescribed. Monitor for signs of infection including fever, chills, redness, swelling, increased pain or foul smelling drainage. Follow-up in Burn Clinic in 1 week. Burn Clinic is located at [address] [place] [address] in [place]. Please call [phone] to make an appointment. Wound care to be performed twice daily. OK to bathe with wound care. Wash wounds with warm, soapy water and a wash cloth.  Dress open areas to the right lower extremity with Silvadene, Xeroform, kerlix and ACE wrap. Continue all home meds as prescribed. Monitor for signs of infection including fever, chills, redness, swelling, increased pain or foul smelling drainage. Follow-up in Burn Clinic in 1 week. Burn Clinic is located at [address] [place] [address] in [place]. Please call [phone] to make an appointment.

## 2019-07-10 NOTE — DISCHARGE NOTE PROVIDER - CARE PROVIDER_API CALL
Enrike Kang)  Plastic Surgery  [address] [place] [address]  Phone: [phone]  Fax: [phone]  Follow Up Time:     Larry Cary)  Plastic Surgery  [address] [place] [address]  Phone: [phone]  Fax: [phone]  Follow Up Time:

## 2019-07-10 NOTE — DISCHARGE NOTE PROVIDER - HOSPITAL COURSE
Griselda Hudson is a 75 yo female with PMH significant for HTN, h/o DVT/PE 2/2 prothrombin gene mutation on Coumadin, asthma and autoimmune hepatitis who presented to the ED on 7/1/19 complaining of worsening erythema to the right lower extremity x2 days. Patient also reported worsening edema, chills, fever and nausea. Patient denies trauma to the leg including insect bite or other break in the skin. CT RLE obtained showing evidence of cellulitis, without concern for necrotizing fasciitis. She is admitted and started on IV antibiotics (cefepime/flagyl initially). Patient is taken to the OR on 7/3 for drainage of abscess and debridement of necrotic tissue. Wound cultures were also taken at that time which resulted with MRSA. Patient was started on IV Vancomycin at that time. Patient returned to the OR on 7/9 undergoing further debridement. She is receiving Santyl/Dakins wet to dry dressings to the wounds and will be discharged with VNS to assist with dressing changes at home. Patient will be discharged home on PO Bactrim for an additional 3 days, to complete a 10 day course of antibiotic coverage for MRSA. Patient will follow-up in Burn Clinic in 1 week. Griselda Hudson is a 75 yo female with PMH significant for HTN, h/o DVT/PE 2/2 prothrombin gene mutation on Coumadin, asthma and autoimmune hepatitis who presented to the ED on 7/1/19 complaining of worsening erythema to the right lower extremity x2 days. Patient also reported worsening edema, chills, fever and nausea. Patient denies trauma to the leg including insect bite or other break in the skin. CT RLE obtained showing evidence of cellulitis, without concern for necrotizing fasciitis. She is admitted and started on IV antibiotics (cefepime/flagyl initially). Patient is taken to the OR on 7/3 for drainage of abscess and debridement of necrotic tissue. Wound cultures were also taken at that time which resulted with MRSA. Patient was started on IV Vancomycin at that time. Patient returned to the OR on 7/9 undergoing further debridement. She is receiving Santyl/Dakins wet to dry dressings to the wounds and will be discharged with VNS to assist with dressing changes at home. Patient will be discharged home on PO Bactrim for an additional 3 days, to complete a 10 day course of antibiotic coverage for MRSA, per Infectious Disease. Patient will follow-up in Burn Clinic in 1 week. Griselda Hudson is a 75 yo female with PMH significant for HTN, h/o DVT/PE 2/2 prothrombin gene mutation on Coumadin, asthma and autoimmune hepatitis who presented to the ED on 7/1/19 complaining of worsening erythema to the right lower extremity x2 days. Patient also reported worsening edema, chills, fever and nausea. Patient denies trauma to the leg including insect bite or other break in the skin. CT RLE obtained showing evidence of cellulitis, without concern for necrotizing fasciitis. She is admitted and started on IV antibiotics (cefepime/flagyl initially). Patient is taken to the OR on 7/3 for drainage of abscess and debridement of necrotic tissue. Wound cultures were also taken at that time which resulted with MRSA. Patient was started on IV Vancomycin at that time. Patient returned to the OR on 7/9 and 7/12/2019 underwent further debridements. Wound care c/s silvadene, adaptic dressing, and kelrix dressing two times a day. Pt will be discharged with VNS to assist with dressing changes at home. As per ID recommendation, please continue at home PO Bactrim DS 1tab bid for 10 more days to complete a course of antibiotic coverage for MRSA. And, Please call [phone] to make a follow up appointment within 1 week with Dr. Kang or Dr. Cary. Clinic is located at [address] [place] [address] on Tuesdays (2-4pm) or Thursdays (9am-1pm). Griselda Hudson is a 73 yo female with PMH significant for HTN, h/o DVT/PE 2/2 prothrombin gene mutation on Coumadin, asthma and autoimmune hepatitis who presented to the ED on 7/1/19 complaining of worsening erythema to the right lower extremity x2 days. Patient also reported worsening edema, chills, fever and nausea. Patient denies trauma to the leg including insect bite or other break in the skin. CT RLE obtained showing evidence of cellulitis, without concern for necrotizing fasciitis. She is admitted and started on IV antibiotics (cefepime/flagyl initially). Patient is taken to the OR on 7/3 for drainage of abscess and debridement of necrotic tissue. Wound cultures were also taken at that time which resulted with MRSA. Patient was started on IV Vancomycin at that time. Patient returned to the OR on 7/9 and 7/12/2019 underwent further debridements. Wound care on discharge is silvadene, Xeroform, Kerlix and ACE wrap two times a day. Pt will be discharged with VNS to assist with dressing changes at home. As per ID recommendation, patient will discharge home on PO Bactrim DS 1tab bid for 10 more days to complete a course of antibiotic coverage for MRSA. Patient will follow up in Burn Clinic within 1 week with Dr. Kang or Dr. Cary. Clinic is located at [address] [place] [address] on Tuesdays (2-4pm) or Thursdays (9am-1pm).

## 2019-07-10 NOTE — PROGRESS NOTE ADULT - ASSESSMENT
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    Assessment & PLAN:      #) Sepsis 2/2 cellulitis  - in ED WBC=18 from 22 from 15K, with hi lactate, and HR  - CT LE: negative for nec fasc  - f/u blood Cx  - ID Plan for Vanco, cefepime & Flagyl- see Vanco level;        ID and BURN- plan for PO bactrim to complete 10 days Antibiotic-  -  Case discussed with Burn- s/p 2x debridement;   - Tylenol PRN for fever  - pain control  now changed to PO percocet- will need some on DC  -Consults made with Burn and ID for followup--   - IV access a problem -  Midline- was placed.  - possible DC today or tomorrow- check on AC pending  - resume coumadin with frequent f/u checks      #) Autoimmune hepatitis   - c/w Prednisone + Tacrolimus, c/w Budesonide - non-formulary, instructed patient to bring own med     #) Prothrombin mutation   - c/w Coumadin 2mg daily, daily INR  - for full AC again    #) HTN - stable, c/w Norvasc +BB    #) Asthma - stable, c/w Singulair  # IV access already a problem--     consider a PICC or Midline early- now it's needed    DVT ppx: on AC Rx  Diet: DASH  Activity: AAT- limited 2/2 pain; have rehab see & evaluate- recommend OP PT post DC  Code status: FULL  Dispo: pending

## 2019-07-10 NOTE — DISCHARGE NOTE PROVIDER - NSDCCPCAREPLAN_GEN_ALL_CORE_FT
PRINCIPAL DISCHARGE DIAGNOSIS  Diagnosis: Cellulitis and abscess of right leg  Assessment and Plan of Treatment:       SECONDARY DISCHARGE DIAGNOSES  Diagnosis: Lactic acidosis  Assessment and Plan of Treatment: resolved    Diagnosis: Hypokalemia  Assessment and Plan of Treatment: resolved PRINCIPAL DISCHARGE DIAGNOSIS  Diagnosis: Cellulitis and abscess of right leg  Assessment and Plan of Treatment: please continue wound care at home: wash wounds with soap and water, apply silvadene cream, adaptic dressing, and kerlix dressing two times a day. Continue antibiotics as prescribed. Follow up in BURN Clinic in one week.      SECONDARY DISCHARGE DIAGNOSES  Diagnosis: Hypertension  Assessment and Plan of Treatment: continue home medications    Diagnosis: Autoimmune hepatitis  Assessment and Plan of Treatment: Continur Tacrolimus 0.5mg bid and Prednisolone 10mg daily. Follow up with PMD in one week.    Diagnosis: Prothrombin gene mutation  Assessment and Plan of Treatment: Hx DVT and PE. Please, continue Coumadin 2gm at bedtime, and follow up in Coumadin clinic to check INR level. Follow up with PMD in one week.

## 2019-07-10 NOTE — DISCHARGE NOTE PROVIDER - NSDCFUADDAPPT_GEN_ALL_CORE_FT
Please call [phone] to schedule a follow-up appointment in Burn Clinic in 1 week. Please follow[facility] [address] tomorrow for ongoing INR checks 1. Please call [phone] to schedule a follow-up appointment in Burn Clinic in 1 week. 2. Please follow-up in [facility] [address] tomorrow for ongoing INR checks

## 2019-07-10 NOTE — CHART NOTE - NSCHARTNOTEFT_GEN_A_CORE
Spoke with Tamanna at the [facility] coumadin clinic, patient is known to them and can come in tomorrow morning any time.     [facility] [place] [address]

## 2019-07-10 NOTE — DISCHARGE NOTE PROVIDER - NSDCCPTREATMENT_GEN_ALL_CORE_FT
PRINCIPAL PROCEDURE  Procedure: Incision and drainage of wound of right lower leg  Findings and Treatment: continue wound care

## 2019-07-10 NOTE — DISCHARGE NOTE PROVIDER - CARE PROVIDERS DIRECT ADDRESSES
[email or web address]

## 2019-07-10 NOTE — DISCHARGE NOTE PROVIDER - NSFOLLOWUPCLINICS_GEN_ALL_ED_FT
[facility]  Burn  [address], [address]  [place] [address]  Phone: [phone]  Fax:   Follow Up Time:

## 2019-07-11 NOTE — PROGRESS NOTE ADULT - ASSESSMENT
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 Denies any trauma, insect/mosquito/flies bite. CT right leg showed cellulitis.     Severe Sepsis in immunocompromised patient secondary to RLE cellulitis and abscess  Purulent cellulitis with rapid progression of erythema   - condition improved  - post debridement by burn x2  - local care with Santyl and Dakins  - cultures + MRSA  - antibiotics as per ID,  IV Vanco continues, to transition to PO for discharge  - pain medication as needed    Nausea   - Zofran prn  - continue Protonix  - monitor    Hypokalemia  - resolved    Hx of autoimmune hepatitis  - on Tacrolimus and Prednisone  - level on chart    Hx of DVT, IVC, PE, Prothrombin gene mutation  - INR 2.46      Patient on Contact Isolation due to MRSA  Nutritional Support  Prognosis guarded, Discharge planning as per burn

## 2019-07-11 NOTE — CHART NOTE - NSCHARTNOTEFT_GEN_A_CORE
Registered Dietitian Follow-Up     Patient Profile Reviewed                           Yes [x]   No []     Nutrition History Previously Obtained        Yes [x]  No []       Pertinent Subjective Information: Pt reports that there is some improvement in po intake, currently consuming 50-75% of meal trays w/o any issues. Pt states that she doesn't need oral supplement any more.      Pertinent Medical Interventions: Severe Sepsis in immunocompromised patient secondary to RLE cellulitis and abscess. Purulent cellulitis with rapid progression of erythema--post debridement by burn x2. Patient on Contact Isolation due to MRSA.    Diet order: DASH/TLC      Anthropometrics:  - Ht. 64"   - Wt. 87.1kg (7/10) vs. 81.6kg (7/9)--will continue to monitor wt trends closely.   - %wt change  - BMI: 33.0  - IBW: 120#+/-10%     Pertinent Lab Data: Reviewed (7/10): Glu 187      Pertinent Meds: abx, colace, miralax, senna, amlodipine, Vit C, cyanocobalamin, ferrous sulfate, lasix, lopressor, protonix, prednisone, tacrolimus     Physical Findings:  - Appearance: alert and oriented; 1+ edema to B/L leg   - GI function: LBM 7/9   - Oral/Mouth cavity: none reported   - Skin: wound      Nutrition Requirements  Weight Used: ideal  120#; needs continued from RD note on 7/8     Estimated Energy Needs: 1014-4347 kcal/day (25-30 kcal/kg IBW)  Estimated Protein Needs: 54-65 gm/day (1-1.2 gm/kg IBW)  Estimated Fluid Needs: per BURN team      Nutrient Intake: meeting kcal/pro needs at this time      Previous Nutrition Diagnosis: Inadequate Oral Intake (resolved)    Nutrition Intervention: meals and snacks    Recommendations:   1. Continue current diet order      Goal/Expected Outcome: Pt to maintain >75% po intake of meals and snacks over the next 7 days      Indicator/Monitoring: RD to monitor energy intake, glucose profile, body composition, nutrition focused physical findings

## 2019-07-11 NOTE — DISCHARGE NOTE NURSING/CASE MANAGEMENT/SOCIAL WORK - NSDCDPATPORTLINK_GEN_ALL_CORE
You can access the [facility], offered by [facility], by registering with the following website: [email or web address]

## 2019-07-11 NOTE — PROGRESS NOTE ADULT - ASSESSMENT
Full thickness wounds bilateral lower legs post debridement - deteriorated appearance  Continue local wound care  Needs further debridement   OR tomorrow planned   Continue pain mgmt , abx   Hold Coumadin -INR elevated  may need FFP

## 2019-07-11 NOTE — DISCHARGE NOTE NURSING/CASE MANAGEMENT/SOCIAL WORK - NSDCFUADDAPPT_GEN_ALL_CORE_FT
Please call [phone] to schedule a follow-up appointment in Burn Clinic in 1 week. Please follow-[facility] [address] tomorrow for ongoing INR checks

## 2019-07-12 NOTE — PRE-OP CHECKLIST - SELECT TESTS ORDERED
PT/PTT/CXR/BMP/CBC/INR/CMP/Type and Screen CMP/INR/CBC/BMP/PT/PTT/Type and Cross/Type and Screen/CXR

## 2019-07-12 NOTE — PROGRESS NOTE ADULT - ASSESSMENT
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 Denies any trauma, insect/mosquito/flies bite. CT right leg showed cellulitis.     #RLE Purulent cellulitis/abscess     s/p OR 7/3, WCX MRSA     Severe sepsis on admission with lactic acidosis P>90 T >101 WBC >12    BCX NG    CT showed < from: CT Lower Extremity No Cont, Right (07.01.19 @ 13:09) >1. Leg cellulitis without evidence of necrotizing fasciitis2. Small knee effusion with mild tricompartmental degenerative change 3. Midfoot and hindfoot degenerative change with vascular calcifications consistent with neuropathic osteoarthropathy  #Obesity BMI 33    RECOMMENDATIONS  Daily random Vanco levels (continue to hold in view of elevated trough 24.9)  - When stable for D/C will plan for PO bactrim 1 DS tab PO BID to complete total 10 days   - on immunosuppression   Monitor Cr

## 2019-07-12 NOTE — CHART NOTE - NSCHARTNOTEFT_GEN_A_CORE
PACU ANESTHESIA ADMISSION NOTE      Procedure: Irrigation and excisional debridement of tissue including subcutaneous tissue: excisionaldebridement right lower leg  Incision and drainage of deep abscess of lower leg: right lower leg    Post op diagnosis:  Abscess of skin      ____  Intubated  TV:______       Rate: ______      FiO2: ______    _x___  Patent Airway    _x___  Full return of protective reflexes    _x___  Full recovery from anesthesia / back to baseline status    Vitals:  T(C): 37.1 (07-12-19 @ 08:00), Max: 37.1 (07-12-19 @ 01:04)  HR: 70 (07-12-19 @ 08:00) (68 - 97)  BP: 148/70 (07-12-19 @ 08:00) (127/65 - 148/70)  RR: 18 (07-12-19 @ 08:00) (18 - 18)  SpO2: 95% (07-11-19 @ 23:07) (95% - 95%)    Mental Status:  _x___ Awake   _____ Alert   _____ Drowsy   _____ Sedated    Nausea/Vomiting:  _x___  NO       ______Yes,   See Post - Op Orders         Pain Scale (0-10):  __0___    Treatment: _x___ None    ____ See Post - Op/PCA Orders    Post - Operative Fluids:   __x__ Oral   ____ See Post - Op Orders    Plan: Discharge:   _      ___x  Floor     _____Critical Care    _____  Other:_________________    Comments:  No anesthesia issues or complications noted.  Discharge when criteria met.

## 2019-07-13 NOTE — PHARMACOTHERAPY INTERVENTION NOTE - COMMENTS
Bolus Trough 10.77 Bolus [place] 17.27
I spoke with Dr Goodwin and will order an alternative because Preparation H is non-formulary
spoke with MD #[id number], based on ID recommendations, pt's trough 14, vanco 750 q 12 h to be issued
Recommended to decrease Neurontin dose based on renal function
Recommended to reduce Gabapentin dose based on patient's renal function (GFR 49)

## 2019-07-15 NOTE — PROGRESS NOTE ADULT - ASSESSMENT
Abscess right lower leg   S/P debridement and drainage--> local wound care,   Continue Vancomycin IV per ID - discharge home on Bactrim po  Continue AC coumadin     Discharged home tomorrow planned

## 2019-07-15 NOTE — PROGRESS NOTE ADULT - ASSESSMENT
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 Denies any trauma, insect/mosquito/flies bite. CT right leg showed cellulitis.     #RLE Purulent cellulitis/abscess     s/p OR 7/3, WCX MRSA     Severe sepsis on admission with lactic acidosis P>90 T >101 WBC >12    BCX NG    CT showed < from: CT Lower Extremity No Cont, Right (07.01.19 @ 13:09) >1. Leg cellulitis without evidence of necrotizing fasciitis2. Small knee effusion with mild tricompartmental degenerative change 3. Midfoot and hindfoot degenerative change with vascular calcifications consistent with neuropathic osteoarthropathy  On vancomycin  IVPB 500 milliGRAM(s) IV Intermittent every 12 hours  7/13 trough 10.3  #Obesity BMI 33    RECOMMENDATIONS  Continue vancomycin  IVPB 500 milliGRAM(s) IV Intermittent every 12 hours  - When stable for D/C will plan for PO bactrim 1 DS tab PO BID to complete total 10 days   Repeat Vanco trough  - on immunosuppression   Monitor Cr

## 2019-07-15 NOTE — PROGRESS NOTE ADULT - ATTENDING COMMENTS
Continuing care and planned surgery discussed with pt. Concerns addressed.
Continuing care discussed with pt. Concerns addressed.

## 2019-07-16 NOTE — PROGRESS NOTE ADULT - SUBJECTIVE AND OBJECTIVE BOX
1 hour critical care medicine    Vital Signs Last 24 Hrs  T(C): 36.2 (16 Jul 2019 07:56), Max: 36.2 (15 Jul 2019 16:13)  T(F): 97.1 (16 Jul 2019 07:56), Max: 97.2 (15 Jul 2019 16:13)  HR: 72 (16 Jul 2019 07:56) (63 - 74)  BP: 146/84 (16 Jul 2019 07:56) (139/75 - 152/70)  BP(mean): --  RR: 18 (16 Jul 2019 07:56) (18 - 18)  SpO2: 96% (15 Jul 2019 23:46) (96% - 96%)    CVP:  T(C): 36.2 (07-16-19 @ 07:56), Max: 36.2 (07-15-19 @ 16:13)  HR: 72 (07-16-19 @ 07:56) (63 - 74)  BP: 146/84 (07-16-19 @ 07:56) (139/75 - 152/70)  RR: 18 (07-16-19 @ 07:56) (18 - 18)  SpO2: 96% (07-15-19 @ 23:46) (96% - 96%)  CVP(mm Hg): --    U.O.:  I&O's Detail    15 Jul 2019 07:01  -  16 Jul 2019 07:00  --------------------------------------------------------  IN:    IV PiggyBack: 100 mL  Total IN: 100 mL    OUT:  Total OUT: 0 mL    Total NET: 100 mL                                    11.8   10.11 )-----------( 312      ( 15 Jul 2019 16:48 )             38.4     07-15    141  |  99  |  19  ----------------------------<  151<H>  4.8   |  30  |  1.1    Ca    8.1<L>      15 Jul 2019 16:48  Phos  4.3     07-15  Mg     2.2     07-15        Large Dressing Change
73 yo F with PMH of autoimmune hepatitis, HTN, DVT/PE 2/2 Prothrombin gene mutation on Coumadin, asthma presented to ED complaining of worsening erythema of RLE over 48 hrs. Reported symptoms started on Saturday morning and progressively worsened. She reported significant pain as well as worsening edema, chills, subjective fevers, and nausea 1day PTP. Patient reports she hit her L leg on a chair and cut herself on Friday night, though denies any trauma to R side. No insects or tick bites. Denies any drainage or purulence to RLE. Denies any other complaints or symptoms. No rash anywhere else on body. Patient has a history of LE cellulitis that has required Burn debridement in the past.    ID history: Pt was diagnosed 4 years ago with autoimmune hepatitis and has been on Tacrolimus and steroids, developed Bilateral lower extremity DVT  and PE 3 years ago and has been on coumadin since then. She injured her right leg last in feb 2018 after mechanical fall, which was closed but few days later she developed right leg cellulitis and was treated with debridement of necrotic tissue by the burn team. Her hospital course was then complicated by acute hypoxic RF requiring ICU admission; s/p BAL 2/2 ORSA/fungal PNA, CDiff, alveolar hemorrhage, and deconditioning requiring 1 month of SNF rehab;  She had another 2 admissions in August 2018 and then in Feb 2019 for left leg cellulitis treated with vanco.    PAST MEDICAL & SURGICAL HISTORY:  Autoimmune hepatitis  Other chronic pulmonary embolism without acute cor pulmonale  Essential hypertension  Autoimmune hepatitis treated with steroids  Asthma  DVT (deep venous thrombosis)  S/P debridement  History of back surgery      OVERNIGHT EVENTS:    SUBJECTIVE / INTERVAL HPI: Patient seen and examined at bedside.     VITAL SIGNS:  Vital Signs Last 24 Hrs  T(C): 36.1 (03 Jul 2019 04:58), Max: 36.8 (02 Jul 2019 22:17)  T(F): 97 (03 Jul 2019 04:58), Max: 98.2 (02 Jul 2019 22:17)  HR: 92 (03 Jul 2019 04:58) (83 - 97)  BP: 141/65 (03 Jul 2019 04:58) (108/62 - 150/70)  BP(mean): --  RR: 18 (03 Jul 2019 04:58) (17 - 18)  SpO2: 95% (03 Jul 2019 03:42) (95% - 95%)      PHYSICAL EXAM:    General: WDWN  HEENT: NC/AT; PERRL, anicteric sclera; dry MM w tongue plaque  Neck: supple  Cardiovascular: +S1/S2, RRR  Respiratory: CTA B/L; no W/R/R  Gastrointestinal: soft, NT/ND; +BSx4  Extremities: WWP; + edema; + scars bilat; L -pink, sl+ TTP med ankle' RLE +erythema below knee; + 3 TTP, and opening Bulla over med mid calf;       LG ecchymoses over both UE  Vascular: 2+ radial, DP/PT pulses B/L  Neurological: AAOx3; no focal deficits    MEDICATIONS:  MEDICATIONS  (STANDING):  amLODIPine   Tablet 5 milliGRAM(s) Oral daily  ascorbic acid 500 milliGRAM(s) Oral daily  ceFAZolin   IVPB 1000 milliGRAM(s) IV Intermittent every 8 hours  chlorhexidine 4% Liquid 1 Application(s) Topical <User Schedule>  cyanocobalamin 1000 MICROGram(s) Oral daily  ferrous    sulfate 325 milliGRAM(s) Oral daily  furosemide   Injectable 40 milliGRAM(s) IV Push daily  gabapentin 300 milliGRAM(s) Oral three times a day  metoprolol tartrate 50 milliGRAM(s) Oral two times a day  montelukast 10 milliGRAM(s) Oral at bedtime  predniSONE   Tablet 10 milliGRAM(s) Oral daily  tacrolimus 0.5 milliGRAM(s) Oral every 12 hours  warfarin 2 milliGRAM(s) Oral once  Vanco    MEDICATIONS  (PRN):  acetaminophen   Tablet .. 650 milliGRAM(s) Oral every 4 hours PRN Mild Pain (1 - 3)  acetaminophen   Tablet .. 650 milliGRAM(s) Oral every 6 hours PRN Temp greater or equal to 38.5C (101.3F)  morphine  - Injectable 2 milliGRAM(s) IV Push every 3 hours PRN Severe Pain (7 - 10)  ondansetron    Tablet 4 milliGRAM(s) Oral every 8 hours PRN Nausea and/or Vomiting      ALLERGIES:  Allergies    No Known Allergies--had episode of ARF-getting reintubated after 1 surgery-per pt    Intolerances        LABS:       Blood Gas Venous - Lactate: 2.7 mmoL/L (07.01.19 @ 13:06)       Blood Gas Venous - Lactate: 1.3 mmoL/L (07.01.19 @ 13:25)                       13.7   18.02 )-----------( 223      ( 03 Jul 2019 07:45 )             43.0     07-03    141  |  100  |  18  ----------------------------<  116<H>  3.8   |  26  |  1.3    Ca    8.6      03 Jul 2019 07:45  Phos  2.9     07-02  Mg     1.6     07-02    TPro  5.8<L>  /  Alb  2.8<L>  /  TBili  1.1  /  DBili  x   /  AST  18  /  ALT  22  /  AlkPhos  109  07-02    PT/INR - ( 03 Jul 2019 07:45 )   PT: 28.40 sec;   INR: 2.49 ratio         PTT - ( 03 Jul 2019 07:45 )  PTT:43.4 sec    CAPILLARY BLOOD GLUCOSE          RADIOLOGY & ADDITIONAL TESTS: Reviewed.
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 Reported symptoms started on Saturday morning and progressively worsened. She reported significant pain as well as worsening edema, chills, subjective fevers, and nausea day PTP. Patient reports she hit her L leg on a chair and cut herself on Friday night, though denies any trauma to R side. No insects or tick bites. Denies any drainage or purulence to RLE. Denies any other complaints or symptoms. No rash anywhere else on body. Patient has a history of LE cellulitis that has required Burn debridement in the past.    ID history: Pt was diagnosed 4 years ago with autoimmune hepatitis and has been on Tacrolimus and steroids, developed Bilateral lower extremity DVT  and PE 3 years ago and has been on coumadin since then. She injured her right leg last in feb 2018 after mechanical fall, which was closed but few days later she developed right leg cellulitis and was treated with debridement of necrotic tissue by the burn team. Her hospital course was then complicated by acute hypoxic RF requiring ICU admission; s/p BAL 2/2 ORSA/fungal PNA, CDiff, alveolar hemorrhage, and deconditioning requiring 1 month of SNF rehab;  She had another 2 admissions in August 2018 and then in Feb 2019 for left leg cellulitis treated with vanco.    PAST MEDICAL & SURGICAL HISTORY:  Autoimmune hepatitis  Other chronic pulmonary embolism without acute cor pulmonale  Essential hypertension  Autoimmune hepatitis treated with steroids  Asthma  DVT (deep venous thrombosis)  S/P debridement  History of back surgery      OVERNIGHT EVENTS:    SUBJECTIVE / INTERVAL HPI: Patient seen and examined at bedside.     VITAL SIGNS:  Vital Signs Last 24 Hrs  T(C): 35.9 (04 Jul 2019 13:29), Max: 36.7 (03 Jul 2019 16:34)  T(F): 96.6 (04 Jul 2019 13:29), Max: 97.5 (03 Jul 2019 17:57)  HR: 76 (04 Jul 2019 13:29) (76 - 96)  BP: 135/65 (04 Jul 2019 13:29) (130/72 - 145/80)  BP(mean): --  RR: 18 (04 Jul 2019 13:29) (14 - 20)  SpO2: 97% (04 Jul 2019 07:50) (96% - 99%)    PHYSICAL EXAM:    General: WDWN  HEENT: NC/AT; PERRL, anicteric sclera; MMM  Neck: supple  Cardiovascular: +S1/S2, RRR  Respiratory: CTA B/L; no W/R/R  Gastrointestinal: soft, NT/ND; +BSx4  Extremities: WWP; no edema, clubbing or cyanosis  Vascular: 2+ radial, DP/PT pulses B/L  Neurological: AAOx3; no focal deficits    MEDICATIONS:  MEDICATIONS  (STANDING):  amLODIPine   Tablet 5 milliGRAM(s) Oral daily  ascorbic acid 500 milliGRAM(s) Oral daily  cefepime   IVPB 1000 milliGRAM(s) IV Intermittent every 12 hours  chlorhexidine 4% Liquid 1 Application(s) Topical <User Schedule>  cyanocobalamin 1000 MICROGram(s) Oral daily  ferrous    sulfate 325 milliGRAM(s) Oral daily  furosemide   Injectable 40 milliGRAM(s) IV Push daily  gabapentin 300 milliGRAM(s) Oral three times a day  lactated ringers. 1000 milliLiter(s) (100 mL/Hr) IV Continuous <Continuous>  metoprolol tartrate 50 milliGRAM(s) Oral two times a day  metroNIDAZOLE  IVPB 500 milliGRAM(s) IV Intermittent every 8 hours  montelukast 10 milliGRAM(s) Oral at bedtime  ondansetron Injectable 4 milliGRAM(s) IV Push once  predniSONE   Tablet 10 milliGRAM(s) Oral daily  tacrolimus 0.5 milliGRAM(s) Oral every 12 hours  vancomycin  IVPB 750 milliGRAM(s) IV Intermittent every 12 hours    MEDICATIONS  (PRN):  acetaminophen   Tablet .. 650 milliGRAM(s) Oral every 4 hours PRN Mild Pain (1 - 3)  acetaminophen   Tablet .. 650 milliGRAM(s) Oral every 6 hours PRN Temp greater or equal to 38.5C (101.3F)  morphine  - Injectable 2 milliGRAM(s) IV Push every 3 hours PRN Severe Pain (7 - 10)  ondansetron    Tablet 4 milliGRAM(s) Oral every 8 hours PRN Nausea and/or Vomiting      ALLERGIES:  Allergies    No Known Allergies    Intolerances        LABS:                        12.7   13.08 )-----------( 235      ( 03 Jul 2019 22:50 )             38.8     07-03    141  |  98  |  17  ----------------------------<  112<H>  3.4<L>   |  28  |  1.0    Ca    8.3<L>      03 Jul 2019 22:50  Phos  3.4     07-03  Mg     1.6     07-03    TPro  5.1<L>  /  Alb  2.6<L>  /  TBili  0.5  /  DBili  x   /  AST  11  /  ALT  9   /  AlkPhos  94  07-03    PT/INR - ( 04 Jul 2019 07:33 )   PT: 32.60 sec;   INR: 2.86 ratio         PTT - ( 03 Jul 2019 22:50 )  PTT:36.4 sec    CAPILLARY BLOOD GLUCOSE          RADIOLOGY & ADDITIONAL TESTS: Reviewed.
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 Reports symptoms started on Saturday morning and progressively worsened. Also reports significant pain as well as worsening edema, chills, subjective fevers, and nausea day PTP. Patient reports she hit her L leg on a chair and cut herself on Friday night, though denies any trauma to R side. No insects or tick bites. Denies any drainage or purulence to RLE. Denies any other complaints or symptoms. No rash anywhere else on body. Patient has a history of LE cellulitis that has required Burn debridement in the past.    PAST MEDICAL & SURGICAL HISTORY:  Autoimmune hepatitis  Other chronic pulmonary embolism without acute cor pulmonale  Essential hypertension  Autoimmune hepatitis treated with steroids  Asthma  DVT (deep venous thrombosis)  S/P debridement  History of back surgery      OVERNIGHT EVENTS:    SUBJECTIVE / INTERVAL HPI: Patient seen and examined at bedside.     VITAL SIGNS:  Vital Signs Last 24 Hrs  T(C): 36.7 (02 Jul 2019 12:44), Max: 37.5 (01 Jul 2019 16:12)  T(F): 98 (02 Jul 2019 12:44), Max: 99.5 (01 Jul 2019 16:12)  HR: 92 (02 Jul 2019 12:44) (90 - 103)  BP: 108/62 (02 Jul 2019 12:44) (108/62 - 144/68)  BP(mean): --  RR: 18 (02 Jul 2019 12:44) (18 - 18)  SpO2: 96% (01 Jul 2019 16:12) (96% - 96%)    PHYSICAL EXAM:    General: WDWN  HEENT: NC/AT; PERRL, anicteric sclera; MMM  Neck: supple  Cardiovascular: +S1/S2, RRR  Respiratory: CTA B/L; no W/R/R  Gastrointestinal: soft, NT/ND; +BSx4  Extremities: WWP; no edema, clubbing or cyanosis  Vascular: 2+ radial, DP/PT pulses B/L  Neurological: AAOx3; no focal deficits    MEDICATIONS:  MEDICATIONS  (STANDING):  amLODIPine   Tablet 5 milliGRAM(s) Oral daily  ascorbic acid 500 milliGRAM(s) Oral daily  ceFAZolin   IVPB 1000 milliGRAM(s) IV Intermittent every 8 hours  chlorhexidine 4% Liquid 1 Application(s) Topical <User Schedule>  cyanocobalamin 1000 MICROGram(s) Oral daily  ferrous    sulfate 325 milliGRAM(s) Oral daily  furosemide   Injectable 40 milliGRAM(s) IV Push daily  gabapentin 300 milliGRAM(s) Oral three times a day  metoprolol tartrate 50 milliGRAM(s) Oral two times a day  montelukast 10 milliGRAM(s) Oral at bedtime  predniSONE   Tablet 10 milliGRAM(s) Oral daily  tacrolimus 0.5 milliGRAM(s) Oral every 12 hours  warfarin 2 milliGRAM(s) Oral once    MEDICATIONS  (PRN):  acetaminophen   Tablet .. 650 milliGRAM(s) Oral every 4 hours PRN Mild Pain (1 - 3)  acetaminophen   Tablet .. 650 milliGRAM(s) Oral every 6 hours PRN Temp greater or equal to 38.5C (101.3F)  morphine  - Injectable 2 milliGRAM(s) IV Push every 3 hours PRN Severe Pain (7 - 10)  ondansetron    Tablet 4 milliGRAM(s) Oral every 8 hours PRN Nausea and/or Vomiting      ALLERGIES:  Allergies    No Known Allergies    Intolerances        LABS:                        14.4   24.34 )-----------( 269      ( 02 Jul 2019 04:30 )             45.7     07-02    141  |  100  |  15  ----------------------------<  130<H>  3.6   |  22  |  1.1    Ca    8.4<L>      02 Jul 2019 04:30  Phos  2.9     07-02  Mg     1.6     07-02    TPro  5.8<L>  /  Alb  2.8<L>  /  TBili  1.1  /  DBili  x   /  AST  18  /  ALT  22  /  AlkPhos  109  07-02    PT/INR - ( 02 Jul 2019 04:30 )   PT: 28.10 sec;   INR: 2.47 ratio         PTT - ( 02 Jul 2019 04:30 )  PTT:35.8 sec    CAPILLARY BLOOD GLUCOSE          RADIOLOGY & ADDITIONAL TESTS: Reviewed.
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 Reported symptoms started on Saturday morning and progressively worsened. She reported significant pain as well as worsening edema, chills, subjective fevers, and nausea day PTP. Patient reports she hit her L leg on a chair and cut herself on Friday night, though denies any trauma to R side. No insects or tick bites. Denies any drainage or purulence to RLE. Denies any other complaints or symptoms. No rash anywhere else on body. Patient has a history of LE cellulitis that has required Burn debridement in the past.    ID history: Pt was diagnosed 4 years ago with autoimmune hepatitis and has been on Tacrolimus and steroids, developed Bilateral lower extremity DVT  and PE 3 years ago and has been on coumadin since then. She injured her right leg last in feb 2018 after mechanical fall, which was closed but few days later she developed right leg cellulitis and was treated with debridement of necrotic tissue by the burn team. Her hospital course was then complicated by acute hypoxic RF requiring ICU admission; s/p BAL 2/2 ORSA/fungal PNA, CDiff, alveolar hemorrhage, and deconditioning requiring 1 month of SNF rehab;  She had another 2 admissions in August 2018 and then in Feb 2019 for left leg cellulitis treated with vanco.    PAST MEDICAL & SURGICAL HISTORY:  Autoimmune hepatitis  Other chronic pulmonary embolism without acute cor pulmonale  Essential hypertension  Autoimmune hepatitis treated with steroids  Asthma  DVT (deep venous thrombosis)  S/P debridement  History of back surgery      OVERNIGHT EVENTS:    SUBJECTIVE / INTERVAL HPI: Patient seen and examined at bedside.     VITAL SIGNS:  Vital Signs Last 24 Hrs  T(C): 36.1 (03 Jul 2019 04:58), Max: 36.8 (02 Jul 2019 22:17)  T(F): 97 (03 Jul 2019 04:58), Max: 98.2 (02 Jul 2019 22:17)  HR: 92 (03 Jul 2019 04:58) (83 - 97)  BP: 141/65 (03 Jul 2019 04:58) (108/62 - 150/70)  BP(mean): --  RR: 18 (03 Jul 2019 04:58) (17 - 18)  SpO2: 95% (03 Jul 2019 03:42) (95% - 95%)    PHYSICAL EXAM:    General: WDWN  HEENT: NC/AT; PERRL, anicteric sclera; MMM  Neck: supple  Cardiovascular: +S1/S2, RRR  Respiratory: CTA B/L; no W/R/R  Gastrointestinal: soft, NT/ND; +BSx4  Extremities: WWP; no edema, clubbing or cyanosis  Vascular: 2+ radial, DP/PT pulses B/L  Neurological: AAOx3; no focal deficits    MEDICATIONS:  MEDICATIONS  (STANDING):  amLODIPine   Tablet 5 milliGRAM(s) Oral daily  ascorbic acid 500 milliGRAM(s) Oral daily  ceFAZolin   IVPB 1000 milliGRAM(s) IV Intermittent every 8 hours  chlorhexidine 4% Liquid 1 Application(s) Topical <User Schedule>  cyanocobalamin 1000 MICROGram(s) Oral daily  ferrous    sulfate 325 milliGRAM(s) Oral daily  furosemide   Injectable 40 milliGRAM(s) IV Push daily  gabapentin 300 milliGRAM(s) Oral three times a day  metoprolol tartrate 50 milliGRAM(s) Oral two times a day  montelukast 10 milliGRAM(s) Oral at bedtime  predniSONE   Tablet 10 milliGRAM(s) Oral daily  tacrolimus 0.5 milliGRAM(s) Oral every 12 hours  warfarin 2 milliGRAM(s) Oral once    MEDICATIONS  (PRN):  acetaminophen   Tablet .. 650 milliGRAM(s) Oral every 4 hours PRN Mild Pain (1 - 3)  acetaminophen   Tablet .. 650 milliGRAM(s) Oral every 6 hours PRN Temp greater or equal to 38.5C (101.3F)  morphine  - Injectable 2 milliGRAM(s) IV Push every 3 hours PRN Severe Pain (7 - 10)  ondansetron    Tablet 4 milliGRAM(s) Oral every 8 hours PRN Nausea and/or Vomiting      ALLERGIES:  Allergies    No Known Allergies    Intolerances        LABS:                        13.7   18.02 )-----------( 223      ( 03 Jul 2019 07:45 )             43.0     07-03    141  |  100  |  18  ----------------------------<  116<H>  3.8   |  26  |  1.3    Ca    8.6      03 Jul 2019 07:45  Phos  2.9     07-02  Mg     1.6     07-02    TPro  5.8<L>  /  Alb  2.8<L>  /  TBili  1.1  /  DBili  x   /  AST  18  /  ALT  22  /  AlkPhos  109  07-02    PT/INR - ( 03 Jul 2019 07:45 )   PT: 28.40 sec;   INR: 2.49 ratio         PTT - ( 03 Jul 2019 07:45 )  PTT:43.4 sec    CAPILLARY BLOOD GLUCOSE          RADIOLOGY & ADDITIONAL TESTS: Reviewed.
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 Reported symptoms started on Saturday morning and progressively worsened. She reported significant pain as well as worsening edema, chills, subjective fevers, and nausea day PTP. Patient reports she hit her L leg on a chair and cut herself on Friday night, though denies any trauma to R side. No insects or tick bites. Denies any drainage or purulence to RLE. Denies any other complaints or symptoms. No rash anywhere else on body. Patient has a history of LE cellulitis that has required Burn debridement in the past.    ID history: Pt was diagnosed 4 years ago with autoimmune hepatitis and has been on Tacrolimus and steroids, developed Bilateral lower extremity DVT  and PE 3 years ago and has been on coumadin since then. She injured her right leg last in feb 2018 after mechanical fall, which was closed but few days later she developed right leg cellulitis and was treated with debridement of necrotic tissue by the burn team. Her hospital course was then complicated by acute hypoxic RF requiring ICU admission; s/p BAL 2/2 ORSA/fungal PNA, CDiff, alveolar hemorrhage, and deconditioning requiring 1 month of SNF rehab;  She had another 2 admissions in August 2018 and then in Feb 2019 for left leg cellulitis treated with vanco.    PAST MEDICAL & SURGICAL HISTORY:  Autoimmune hepatitis  Other chronic pulmonary embolism without acute cor pulmonale  Essential hypertension  Autoimmune hepatitis treated with steroids  Asthma  DVT (deep venous thrombosis)  S/P debridement  History of back surgery      OVERNIGHT EVENTS:    SUBJECTIVE / INTERVAL HPI: Patient seen and examined at bedside.     VITAL SIGNS:  Vital Signs Last 24 Hrs  T(C): 36.3 (05 Jul 2019 13:27), Max: 36.3 (05 Jul 2019 13:27)  T(F): 97.4 (05 Jul 2019 13:27), Max: 97.4 (05 Jul 2019 13:27)  HR: 79 (05 Jul 2019 13:27) (71 - 81)  BP: 143/63 (05 Jul 2019 13:27) (131/73 - 146/65)  BP(mean): --  RR: 18 (05 Jul 2019 13:27) (18 - 18)  SpO2: 96% (05 Jul 2019 07:33) (96% - 96%)    PHYSICAL EXAM:    General: WDWN  HEENT: NC/AT; PERRL, anicteric sclera; MMM  Neck: supple  Cardiovascular: +S1/S2, RRR  Respiratory: CTA B/L; no W/R/R  Gastrointestinal: soft, NT/ND; +BSx4  Extremities: WWP; no edema, clubbing or cyanosis  Vascular: 2+ radial, DP/PT pulses B/L  Neurological: AAOx3; no focal deficits    MEDICATIONS:  MEDICATIONS  (STANDING):  amLODIPine   Tablet 5 milliGRAM(s) Oral daily  ascorbic acid 500 milliGRAM(s) Oral daily  cefepime   IVPB 1000 milliGRAM(s) IV Intermittent every 12 hours  chlorhexidine 4% Liquid 1 Application(s) Topical <User Schedule>  cyanocobalamin 1000 MICROGram(s) Oral daily  ferrous    sulfate 325 milliGRAM(s) Oral daily  furosemide   Injectable 40 milliGRAM(s) IV Push daily  gabapentin 300 milliGRAM(s) Oral three times a day  metoprolol tartrate 50 milliGRAM(s) Oral two times a day  metroNIDAZOLE  IVPB 500 milliGRAM(s) IV Intermittent every 8 hours  montelukast 10 milliGRAM(s) Oral at bedtime  pantoprazole    Tablet 40 milliGRAM(s) Oral before breakfast  potassium chloride    Tablet ER 20 milliEquivalent(s) Oral daily  predniSONE   Tablet 10 milliGRAM(s) Oral daily  sodium chloride 0.9% lock flush 3 milliLiter(s) IV Push every 8 hours  tacrolimus 0.5 milliGRAM(s) Oral every 12 hours  vancomycin  IVPB 750 milliGRAM(s) IV Intermittent every 12 hours    MEDICATIONS  (PRN):  acetaminophen   Tablet .. 650 milliGRAM(s) Oral every 4 hours PRN Mild Pain (1 - 3)  acetaminophen   Tablet .. 650 milliGRAM(s) Oral every 6 hours PRN Temp greater or equal to 38.5C (101.3F)  morphine  - Injectable 2 milliGRAM(s) IV Push every 3 hours PRN Severe Pain (7 - 10)  ondansetron    Tablet 4 milliGRAM(s) Oral every 8 hours PRN Nausea and/or Vomiting      ALLERGIES:  Allergies    No Known Allergies    Intolerances        LABS:                        11.6   9.14  )-----------( 237      ( 05 Jul 2019 06:41 )             35.9     07-05    137  |  96<L>  |  16  ----------------------------<  181<H>  3.1<L>   |  29  |  1.1    Ca    8.0<L>      05 Jul 2019 06:41  Phos  3.4     07-03  Mg     1.6     07-03    TPro  5.1<L>  /  Alb  2.6<L>  /  TBili  0.5  /  DBili  x   /  AST  11  /  ALT  9   /  AlkPhos  94  07-03    PT/INR - ( 05 Jul 2019 06:41 )   PT: 33.20 sec;   INR: 2.92 ratio         PTT - ( 05 Jul 2019 06:41 )  PTT:35.9 sec    CAPILLARY BLOOD GLUCOSE          RADIOLOGY & ADDITIONAL TESTS: Reviewed.
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 Reported symptoms started on Saturday morning and progressively worsened. She reported significant pain as well as worsening edema, chills, subjective fevers, and nausea day PTP. Patient reports she hit her L leg on a chair and cut herself on Friday night, though denies any trauma to R side. No insects or tick bites. Denies any drainage or purulence to RLE. Denies any other complaints or symptoms. No rash anywhere else on body. Patient has a history of LE cellulitis that has required Burn debridement in the past.    ID history: Pt was diagnosed 4 years ago with autoimmune hepatitis and has been on Tacrolimus and steroids, developed Bilateral lower extremity DVT  and PE 3 years ago and has been on coumadin since then. She injured her right leg last in feb 2018 after mechanical fall, which was closed but few days later she developed right leg cellulitis and was treated with debridement of necrotic tissue by the burn team. Her hospital course was then complicated by acute hypoxic RF requiring ICU admission; s/p BAL 2/2 ORSA/fungal PNA, CDiff, alveolar hemorrhage, and deconditioning requiring 1 month of SNF rehab;  She had another 2 admissions in August 2018 and then in Feb 2019 for left leg cellulitis treated with vanco.    PAST MEDICAL & SURGICAL HISTORY:  Autoimmune hepatitis  Other chronic pulmonary embolism without acute cor pulmonale  Essential hypertension  Autoimmune hepatitis treated with steroids  Asthma  DVT (deep venous thrombosis)  S/P debridement  History of back surgery    OVERNIGHT EVENTS:    SUBJECTIVE / INTERVAL HPI: Patient seen and examined at bedside.     PAST MEDICAL & SURGICAL HISTORY:  Autoimmune hepatitis  Other chronic pulmonary embolism without acute cor pulmonale  Essential hypertension  Autoimmune hepatitis treated with steroids  Asthma  DVT (deep venous thrombosis)  S/P debridement  History of back surgery    OVERNIGHT EVENTS:    SUBJECTIVE / INTERVAL HPI: Patient seen and examined at bedside.     VITAL SIGNS:  Vital Signs Last 24 Hrs  T(C): 36.1 (08 Jul 2019 05:00), Max: 36.3 (07 Jul 2019 13:44)  T(F): 96.9 (08 Jul 2019 05:00), Max: 97.3 (07 Jul 2019 13:44)  HR: 80 (08 Jul 2019 05:00) (73 - 80)  BP: 117/84 (08 Jul 2019 05:00) (117/84 - 148/80)  BP(mean): --  RR: 20 (08 Jul 2019 05:00) (16 - 20)  SpO2: --    PHYSICAL EXAM:    General: WDWN  HEENT: NC/AT; PERRL, anicteric sclera; MMM  Neck: supple  Cardiovascular: +S1/S2, RRR  Respiratory: CTA B/L; no W/R/R  Gastrointestinal: soft, NT/ND; +BSx4  Extremities: WWP; no edema, clubbing or cyanosis  Vascular: 2+ radial, DP/PT pulses B/L  Neurological: AAOx3; no focal deficits    MEDICATIONS:  MEDICATIONS  (STANDING):  amLODIPine   Tablet 5 milliGRAM(s) Oral daily  ascorbic acid 500 milliGRAM(s) Oral daily  cefepime   IVPB 1000 milliGRAM(s) IV Intermittent every 12 hours  chlorhexidine 4% Liquid 1 Application(s) Topical <User Schedule>  cyanocobalamin 1000 MICROGram(s) Oral daily  ferrous    sulfate 325 milliGRAM(s) Oral daily  furosemide   Injectable 40 milliGRAM(s) IV Push daily  gabapentin 300 milliGRAM(s) Oral three times a day  metoprolol tartrate 50 milliGRAM(s) Oral two times a day  metroNIDAZOLE  IVPB 500 milliGRAM(s) IV Intermittent every 8 hours  montelukast 10 milliGRAM(s) Oral at bedtime  pantoprazole    Tablet 40 milliGRAM(s) Oral before breakfast  potassium chloride    Tablet ER 40 milliEquivalent(s) Oral daily  predniSONE   Tablet 10 milliGRAM(s) Oral daily  sodium chloride 0.9% lock flush 3 milliLiter(s) IV Push every 8 hours  tacrolimus 0.5 milliGRAM(s) Oral every 12 hours  vancomycin  IVPB 750 milliGRAM(s) IV Intermittent every 12 hours  warfarin 3 milliGRAM(s) Oral once    MEDICATIONS  (PRN):  acetaminophen   Tablet .. 650 milliGRAM(s) Oral every 4 hours PRN Mild Pain (1 - 3)  acetaminophen   Tablet .. 650 milliGRAM(s) Oral every 6 hours PRN Temp greater or equal to 38.5C (101.3F)  morphine  - Injectable 2 milliGRAM(s) IV Push every 3 hours PRN Severe Pain (7 - 10)  ondansetron    Tablet 4 milliGRAM(s) Oral every 8 hours PRN Nausea and/or Vomiting      ALLERGIES:  Allergies    No Known Allergies    Intolerances        LABS:                        12.8   9.76  )-----------( 300      ( 08 Jul 2019 07:03 )             40.1     07-08    142  |  101  |  18  ----------------------------<  115<H>  3.7   |  28  |  1.2    Ca    8.5      08 Jul 2019 07:03      PT/INR - ( 08 Jul 2019 07:03 )   PT: 20.70 sec;   INR: 1.81 ratio             CAPILLARY BLOOD GLUCOSE          RADIOLOGY & ADDITIONAL TESTS: Reviewed.
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 Reported symptoms started on Saturday morning and progressively worsened. She reported significant pain as well as worsening edema, chills, subjective fevers, and nausea day PTP. Patient reports she hit her L leg on a chair and cut herself on Friday night, though denies any trauma to R side. No insects or tick bites. Denies any drainage or purulence to RLE. Denies any other complaints or symptoms. No rash anywhere else on body. Patient has a history of LE cellulitis that has required Burn debridement in the past.    ID history: Pt was diagnosed 4 years ago with autoimmune hepatitis and has been on Tacrolimus and steroids, developed Bilateral lower extremity DVT  and PE 3 years ago and has been on coumadin since then. She injured her right leg last in feb 2018 after mechanical fall, which was closed but few days later she developed right leg cellulitis and was treated with debridement of necrotic tissue by the burn team. Her hospital course was then complicated by acute hypoxic RF requiring ICU admission; s/p BAL 2/2 ORSA/fungal PNA, CDiff, alveolar hemorrhage, and deconditioning requiring 1 month of SNF rehab;  She had another 2 admissions in August 2018 and then in Feb 2019 for left leg cellulitis treated with vanco.    PAST MEDICAL & SURGICAL HISTORY:  Autoimmune hepatitis  Other chronic pulmonary embolism without acute cor pulmonale  Essential hypertension  Autoimmune hepatitis treated with steroids  Asthma  DVT (deep venous thrombosis)  S/P debridement  History of back surgery    OVERNIGHT EVENTS:    SUBJECTIVE / INTERVAL HPI: Patient seen and examined at bedside.     VITAL SIGNS:  Vital Signs Last 24 Hrs  T(C): 35.9 (06 Jul 2019 04:35), Max: 36.4 (05 Jul 2019 21:45)  T(F): 96.6 (06 Jul 2019 04:35), Max: 97.6 (05 Jul 2019 21:45)  HR: 75 (06 Jul 2019 04:35) (74 - 79)  BP: 133/70 (06 Jul 2019 04:35) (133/70 - 143/63)  BP(mean): --  RR: 18 (06 Jul 2019 04:35) (18 - 18)  SpO2: --    PHYSICAL EXAM:    General: WDWN  HEENT: NC/AT; PERRL, anicteric sclera; MMM  Neck: supple  Cardiovascular: +S1/S2, RRR  Respiratory: CTA B/L; no W/R/R  Gastrointestinal: soft, NT/ND; +BSx4  Extremities: WWP; no edema, clubbing or cyanosis  Vascular: 2+ radial, DP/PT pulses B/L  Neurological: AAOx3; no focal deficits    MEDICATIONS:  MEDICATIONS  (STANDING):  amLODIPine   Tablet 5 milliGRAM(s) Oral daily  ascorbic acid 500 milliGRAM(s) Oral daily  cefepime   IVPB 1000 milliGRAM(s) IV Intermittent every 12 hours  chlorhexidine 4% Liquid 1 Application(s) Topical <User Schedule>  cyanocobalamin 1000 MICROGram(s) Oral daily  ferrous    sulfate 325 milliGRAM(s) Oral daily  furosemide   Injectable 40 milliGRAM(s) IV Push daily  gabapentin 300 milliGRAM(s) Oral three times a day  metoprolol tartrate 50 milliGRAM(s) Oral two times a day  metroNIDAZOLE  IVPB 500 milliGRAM(s) IV Intermittent every 8 hours  montelukast 10 milliGRAM(s) Oral at bedtime  pantoprazole    Tablet 40 milliGRAM(s) Oral before breakfast  potassium chloride    Tablet ER 20 milliEquivalent(s) Oral daily  predniSONE   Tablet 10 milliGRAM(s) Oral daily  sodium chloride 0.9% lock flush 3 milliLiter(s) IV Push every 8 hours  tacrolimus 0.5 milliGRAM(s) Oral every 12 hours  vancomycin  IVPB 750 milliGRAM(s) IV Intermittent every 12 hours    MEDICATIONS  (PRN):  acetaminophen   Tablet .. 650 milliGRAM(s) Oral every 4 hours PRN Mild Pain (1 - 3)  acetaminophen   Tablet .. 650 milliGRAM(s) Oral every 6 hours PRN Temp greater or equal to 38.5C (101.3F)  morphine  - Injectable 2 milliGRAM(s) IV Push every 3 hours PRN Severe Pain (7 - 10)  ondansetron    Tablet 4 milliGRAM(s) Oral every 8 hours PRN Nausea and/or Vomiting      ALLERGIES:  Allergies    No Known Allergies    Intolerances        LABS:                        12.3   8.61  )-----------( 282      ( 06 Jul 2019 08:23 )             39.3     07-06    142  |  99  |  14  ----------------------------<  151<H>  3.4<L>   |  31  |  1.1    Ca    8.6      06 Jul 2019 08:23      PT/INR - ( 05 Jul 2019 06:41 )   PT: 33.20 sec;   INR: 2.92 ratio         PTT - ( 06 Jul 2019 08:23 )  PTT:34.7 sec    CAPILLARY BLOOD GLUCOSE          RADIOLOGY & ADDITIONAL TESTS: Reviewed.
AM rounds     Pt: c/o pain with wound care ; ambulating with PT walker   No acute events o/n  Vital Signs Last 24 Hrs  T(C): 36.8 (11 Jul 2019 15:37), Max: 36.8 (11 Jul 2019 15:37)  T(F): 98.3 (11 Jul 2019 15:37), Max: 98.3 (11 Jul 2019 15:37)  HR: 70 (11 Jul 2019 15:37) (70 - 80)  BP: 116/67 (11 Jul 2019 15:37) (114/65 - 122/70)  RR: 18 (11 Jul 2019 15:37) (18 - 18)  SpO2: 95% (11 Jul 2019 15:37) (95% - 99%)    I&O's Summary    10 Jul 2019 07:01  -  11 Jul 2019 07:00  --------------------------------------------------------  IN: 340 mL / OUT: 850 mL / NET: -510 mL        07-11    139  |  99  |  22<H>  ----------------------------<  176<H>  4.3   |  28  |  1.5    Ca    8.4<L>      11 Jul 2019 16:30  Phos  4.2     07-10  Mg     2.1     07-11                            12.1   12.93 )-----------( 302      ( 11 Jul 2019 16:30 )             38.1   PT/INR - ( 11 Jul 2019 16:30 )   PT: 39.10 sec;   INR: 3.44 ratio/PTT - ( 10 Jul 2019 16:32 )  PTT:33.7 sec    EXAM:   Right leg wounds- dark discolored fat necrosis ; 2 cm tender swelling between open wounds; no bleeding '      dressing change done
Chart reviewed, patient examined. Pertinent results reviewed.  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 Reported symptoms started on Saturday morning and progressively worsened. She reported significant pain as well as worsening edema, chills, subjective fevers, and nausea 1day PTP. Patient reports she hit her L leg on a chair and cut herself on Friday night, though denies any trauma to R side. No insects or tick bites. Denies any drainage or purulence to RLE. Denies any other complaints or symptoms. No rash anywhere else on body. Patient has a history of LE cellulitis that has required Burn debridement in the past. She continues to C/O severe pain in the infected RLE cellulitis & wound area. She also c/o some nausea, constipation and bloating.  She also c/o the repeated sticks for vascular access.      ID history: Pt was diagnosed 4 years ago with autoimmune hepatitis and has been on Tacrolimus and steroids, developed Bilateral lower extremity DVT  and PE 3 years ago and has been on coumadin since then. She injured her right leg last in feb 2018 after mechanical fall, which was closed but few days later she developed right leg cellulitis and was treated with debridement of necrotic tissue by the burn team. Her hospital course was then complicated by acute hypoxic RF requiring ICU admission; s/p BAL 2/2 ORSA/fungal PNA, CDiff, alveolar hemorrhage, and deconditioning requiring 1 month of SNF rehab;  She had another 2 admissions in August 2018 and then in Feb 2019 for left leg cellulitis treated with vanco.    PAST MEDICAL & SURGICAL HISTORY:  Autoimmune hepatitis  Other chronic pulmonary embolism without acute cor pulmonale  Essential hypertension  Autoimmune hepatitis treated with steroids  Asthma  DVT (deep venous thrombosis)  S/P debridement  History of back surgery      OVERNIGHT EVENTS:    SUBJECTIVE / INTERVAL HPI: Patient seen and examined at bedside.   Vital Signs Last 24 Hrs  T(C): 36.1 (08 Jul 2019 05:00), Max: 36.3 (07 Jul 2019 13:44)  T(F): 96.9 (08 Jul 2019 05:00), Max: 97.3 (07 Jul 2019 13:44)  HR: 80 (08 Jul 2019 05:00) (73 - 80)  BP: 117/84 (08 Jul 2019 05:00) (117/84 - 148/80)  BP(mean): --  RR: 20 (08 Jul 2019 05:00) (16 - 20)  SpO2: --  PHYSICAL EXAM:    General: WDWN  HEENT: NC/AT; PERRL, anicteric sclera; dry MM w tongue plaque  Neck: supple  Cardiovascular: +S1/S2, RRR  Respiratory: CTA B/L; no W/R/R  Gastrointestinal: soft, slightly distended; NT/ND; +BSx4; no M  Extremities: WWP; + edema; + scars bilat; L -pink, sl+ TTP med ankle' RLE +erythema below knee; + 3 TTP, and packed wound over med mid calf; +3 TTP-entire posterior R calf      LG ecchymoses over both UE  Vascular: 2+ radial, DP/PT pulses B/L  Neurological: AAOx3; no focal deficits    MEDICATIONS:  MEDICATIONS  (STANDING):  amLODIPine   Tablet 5 milliGRAM(s) Oral daily  ascorbic acid 500 milliGRAM(s) Oral daily  cefepime   IVPB 1000 milliGRAM(s) IV Intermittent every 12 hours  chlorhexidine 4% Liquid 1 Application(s) Topical <User Schedule>  cyanocobalamin 1000 MICROGram(s) Oral daily  ferrous    sulfate 325 milliGRAM(s) Oral daily  furosemide   Injectable 40 milliGRAM(s) IV Push daily  gabapentin 300 milliGRAM(s) Oral three times a day  metoprolol tartrate 50 milliGRAM(s) Oral two times a day  metroNIDAZOLE  IVPB 500 milliGRAM(s) IV Intermittent every 8 hours  montelukast 10 milliGRAM(s) Oral at bedtime  pantoprazole    Tablet 40 milliGRAM(s) Oral before breakfast  potassium chloride    Tablet ER 40 milliEquivalent(s) Oral daily  predniSONE   Tablet 10 milliGRAM(s) Oral daily  sodium chloride 0.9% lock flush 3 milliLiter(s) IV Push every 8 hours  tacrolimus 0.5 milliGRAM(s) Oral every 12 hours  vancomycin  IVPB 750 milliGRAM(s) IV Intermittent every 12 hours  warfarin 3 milliGRAM(s) Oral once    MEDICATIONS  (PRN):  acetaminophen   Tablet .. 650 milliGRAM(s) Oral every 4 hours PRN Mild Pain (1 - 3)  acetaminophen   Tablet .. 650 milliGRAM(s) Oral every 6 hours PRN Temp greater or equal to 38.5C (101.3F)  morphine  - Injectable 2 milliGRAM(s) IV Push every 3 hours PRN Severe Pain (7 - 10)  ondansetron    Tablet 4 milliGRAM(s) Oral every 8 hours PRN Nausea and/or Vomiting      Allergies:    No Known Allergies--had episode of ARF-getting reintubated after 1 surgery-per pt    Intolerances        LABS:       Blood Gas Venous - Lactate: 2.7 mmoL/L (07.01.19 @ 13:06)       Blood Gas Venous - Lactate: 1.3 mmoL/L (07.01.19 @ 13:25)                               12.8   9.76  )-----------( 300      ( 08 Jul 2019 07:03 )             40.1                 13.7   18.02 )-----------( 223      ( 03 Jul 2019 07:45 )             43.0     07-08    142  |  101  |  18  ----------------------------<  115<H>  3.7   |  28  |  1.2    Ca    8.5      08 Jul 2019 07:03    PT/INR - ( 08 Jul 2019 07:03 )   PT: 20.70 sec;   INR: 1.81 ratio           RADIOLOGY & ADDITIONAL TESTS: Reviewed.
Chart reviewed, patient examined. Pertinent results reviewed.  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 Reported symptoms started on Saturday morning and progressively worsened. She reported significant pain as well as worsening edema, chills, subjective fevers, and nausea 1day PTP. Patient reports she hit her L leg on a chair and cut herself on Friday night, though denies any trauma to R side. No insects or tick bites. Denies any drainage or purulence to RLE. Denies any other complaints or symptoms. No rash anywhere else on body. Patient has a history of LE cellulitis that has required Burn debridement in the past. She continued to C/O severe pain in the infected RLE cellulitis & wound area. She also c/o some nausea, constipation and bloating.  She also c/o the repeated sticks for vascular access. She came to the ED where evaluation showed she had cellulitis with sepsis POA. See lactic acidemia on POA.      ID history: Pt was diagnosed 4 years ago with autoimmune hepatitis and has been on Tacrolimus and steroids, developed Bilateral lower extremity DVT  and PE 3 years ago and has been on coumadin since then. She injured her right leg last in feb 2018 after mechanical fall, which was closed but few days later she developed right leg cellulitis and was treated with debridement of necrotic tissue by the burn team. Her hospital course was then complicated by acute hypoxic RF requiring ICU admission; s/p BAL 2/2 ORSA/fungal PNA, CDiff, alveolar hemorrhage, and deconditioning requiring 1 month of SNF rehab;  She had another 2 admissions in August 2018 and then in Feb 2019 for left leg cellulitis treated with vanco.    PAST MEDICAL & SURGICAL HISTORY:  Autoimmune hepatitis  Other chronic pulmonary embolism without acute cor pulmonale  Essential hypertension  Autoimmune hepatitis treated with steroids  Asthma  DVT (deep venous thrombosis)  S/P debridement  History of back surgery      OVERNIGHT EVENTS: Had debridement again yesterday. No Fevers.    SUBJECTIVE / INTERVAL HPI: Patient seen and examined at bedside. Pain is slightly better.    Vital Signs Last 24 Hrs  T(C): 37 (10 Jul 2019 07:57), Max: 37 (10 Jul 2019 07:57)  T(F): 98.6 (10 Jul 2019 07:57), Max: 98.6 (10 Jul 2019 07:57)  HR: 80 (10 Jul 2019 07:57) (67 - 80)  BP: 117/67 (10 Jul 2019 07:57) (117/67 - 150/73)  BP(mean): --  RR: 18 (10 Jul 2019 07:57) (18 - 18)  SpO2: 97% (09 Jul 2019 16:16) (97% - 97%)      PHYSICAL EXAM:   in good spirits  General: WDWN,   HEENT: NC/AT; PERRL, anicteric sclera; dry MMM;   Neck: supple  Cardiovascular: +S1/S2, RRR  Respiratory: CTA B/L; no W/R/R  Gastrointestinal: soft, flat; NT/ND; +BSx4; no M, HSM  Extremities: WWP; + edema; + scars bilat; L -pink, sl+ TTP med ankle' RLE +erythema below knee; + 2 TTP, and packed wound over med mid calf;       LG ecchymoses over both UE- DECreased  Vascular: 2+ radial, DP/PT pulses B/L  Neurological: AAOx3; no focal deficits    MEDICATIONS:  MEDICATIONS  (STANDING):  amLODIPine   Tablet 5 milliGRAM(s) Oral daily  ascorbic acid 500 milliGRAM(s) Oral daily  chlorhexidine 4% Liquid 1 Application(s) Topical <User Schedule>  cyanocobalamin 1000 MICROGram(s) Oral daily  docusate sodium 100 milliGRAM(s) Oral three times a day  ferrous    sulfate 325 milliGRAM(s) Oral daily  furosemide   Injectable 40 milliGRAM(s) IV Push daily  gabapentin 300 milliGRAM(s) Oral three times a day  metoprolol tartrate 50 milliGRAM(s) Oral two times a day  montelukast 10 milliGRAM(s) Oral at bedtime  pantoprazole    Tablet 40 milliGRAM(s) Oral before breakfast  predniSONE   Tablet 10 milliGRAM(s) Oral daily  senna 2 Tablet(s) Oral at bedtime  sodium chloride 0.9% lock flush 3 milliLiter(s) IV Push every 8 hours  tacrolimus 0.5 milliGRAM(s) Oral every 12 hours  vancomycin  IVPB 500 milliGRAM(s) IV Intermittent every 12 hours    MEDICATIONS  (PRN):  acetaminophen   Tablet .. 650 milliGRAM(s) Oral every 6 hours PRN Temp greater or equal to 38.5C (101.3F)  ondansetron    Tablet 4 milliGRAM(s) Oral every 8 hours PRN Nausea and/or Vomiting  oxyCODONE    5 mG/acetaminophen 325 mG 2 Tablet(s) Oral every 4 hours PRN Severe Pain (7 - 10)  oxyCODONE    5 mG/acetaminophen 325 mG 1 Tablet(s) Oral every 4 hours PRN Moderate Pain (4 - 6)  polyethylene glycol 3350 17 Gram(s) Oral at bedtime PRN Constipation    Allergies:    No Known Allergies--had episode of ARF-getting reintubated after 1 surgery-per pt    Intolerances        LABS:       Blood Gas Venous - Lactate: 2.7 mmoL/L (07.01.19 @ 13:06)       Blood Gas Venous - Lactate: 1.3 mmoL/L (07.01.19 @ 13:25)                               13.0   11.52 )-----------( 360      ( 09 Jul 2019 16:54 )             40.5                          13.7   18.02 )-----------( 223      ( 03 Jul 2019 07:45 )             43.0   07-09    140  |  101  |  19  ----------------------------<  145<H>  4.6   |  26  |  1.1    Ca    8.5      09 Jul 2019 16:54  Phos  3.8     07-09  Mg     1.7     07-09    TPro  5.4<L>  /  Alb  2.7<L>  /  TBili  0.4  /  DBili  <0.2  /  AST  16  /  ALT  8   /  AlkPhos  79  07-09 07-08    142  |  101  |  18  ----------------------------<  115<H>  3.7   |  28  |  1.2    Ca    8.5      08 Jul 2019 07:03  PT/INR - ( 09 Jul 2019 16:54 )   PT: 23.10 sec;   INR: 2.02 ratio         PTT - ( 09 Jul 2019 16:54 )  PTT:31.2 sec  PT/INR - ( 08 Jul 2019 07:03 )   PT: 20.70 sec;   INR: 1.81 ratio           RADIOLOGY & ADDITIONAL TESTS: Reviewed.
DARNELL, DONI  74y, Female  Allergy: No Known Allergies      CHIEF COMPLAINT: cellulitis (08 Jul 2019 10:20)      INTERVAL EVENTS/HPI  - No acute events overnight  - T(F): , Max: 97.3 (07-07-19 @ 13:44)  - Denies any worsening symptoms  - Tolerating medication  - WBC Count: 9.76 K/uL (07-08-19 @ 07:03)      ROS  General: Denies fevers, chills, nightsweats, weight loss  HEENT: Denies headache, rhinorrhea, sore throat, eye pain  CV: Denies CP, palpitations  PULM: Denies SOB, cough  GI: Denies abdominal pain, hematochezia/melena  : Denies dysuria, hematuria  MSK: Denies arthralgias, myalgias  SKIN: Denies rash, lesions  NEURO: Denies paresthesias, weakness  PSYCH: Denies depression, anxiety    FH noncontributory    VITALS:  T(F): 96.9, Max: 97.3 (07-07-19 @ 13:44)  HR: 80  BP: 117/84  RR: 20Vital Signs Last 24 Hrs  T(C): 36.1 (08 Jul 2019 05:00), Max: 36.3 (07 Jul 2019 13:44)  T(F): 96.9 (08 Jul 2019 05:00), Max: 97.3 (07 Jul 2019 13:44)  HR: 80 (08 Jul 2019 05:00) (73 - 80)  BP: 117/84 (08 Jul 2019 05:00) (117/84 - 148/80)  BP(mean): --  RR: 20 (08 Jul 2019 05:00) (16 - 20)  SpO2: --    PHYSICAL EXAM:  Gen: NAD, resting in bed  HEENT: Normocephalic, atraumatic  Neck: supple, no lymphadenopathy  CV: Regular rate & regular rhythm  Lungs: decreased BS at bases, no fremitus  Abdomen: Soft, BS present  Ext: Warm, well perfused, LE dressings  Neuro: non focal, awake  Skin: no rash, no erythema  Lines: no phlebitis    FH Non-contributory  SH Non-contributory     TESTS & MEASUREMENTS:                        12.8   9.76  )-----------( 300      ( 08 Jul 2019 07:03 )             40.1     07-08    142  |  101  |  18  ----------------------------<  115<H>  3.7   |  28  |  1.2    Ca    8.5      08 Jul 2019 07:03      eGFR if Non African American: 44 mL/min/1.73M2 (07-08-19 @ 07:03)  eGFR if African American: 52 mL/min/1.73M2 (07-08-19 @ 07:03)          Culture - Tissue with Gram Stain (collected 07-03-19 @ 19:57)  Source: .Tissue None  Gram Stain (07-04-19 @ 09:23):    No polymorphonuclear leukocytes per low power field    No organisms seen per oil power field  Preliminary Report (07-07-19 @ 13:32):    Few Coag Negative Staphylococcus    Few Enterococcus faecalis    Few Alpha hemolytic strep "Susceptibilities not performed"    Few Enterococcus faecalis #2  Organism: Coag Negative Staphylococcus  Enterococcus faecalis  Enterococcus faecalis (07-07-19 @ 13:31)  Organism: Coag Negative Staphylococcus (07-07-19 @ 13:31)      -  Ampicillin/Sulbactam: S <=8/4      -  Cefazolin: S <=4      -  Clindamycin: S <=0.25      -  Erythromycin: S <=0.25      -  Gentamicin: S <=1 Should not be used as monotherapy      -  Oxacillin: S <=0.25      -  Penicillin: R >8      -  RIF- Rifampin: S <=1 Should not be used as monotherapy      -  Tetra/Doxy: S <=1      -  Trimethoprim/Sulfamethoxazole: S <=0.5/9.5      -  Vancomycin: S 1      Method Type: EROS  Organism: Enterococcus faecalis (07-07-19 @ 13:09)      -  Ampicillin: S <=2 Predicts results to ampicillin/sulbactam, amoxacillin-clavulanate and  piperacillin-tazobactam.      -  Tetra/Doxy: S <=1      -  Vancomycin: S 4      Method Type: EROS  Organism: Enterococcus faecalis (07-07-19 @ 13:08)      -  Ampicillin: S <=2 Predicts results to ampicillin/sulbactam, amoxacillin-clavulanate and  piperacillin-tazobactam.      -  Tetra/Doxy: S <=1      -  Vancomycin: S 2      Method Type: EROS    Culture - Surgical Swab (collected 07-03-19 @ 19:57)  Source: .Surgical Swab None  Preliminary Report (07-06-19 @ 09:39):    Numerous Methicillin resistant Staphylococcus aureus  Organism: Methicillin resistant Staphylococcus aureus (07-06-19 @ 09:38)  Organism: Methicillin resistant Staphylococcus aureus (07-06-19 @ 09:38)      -  Ampicillin/Sulbactam: R >16/8      -  Cefazolin: R 8      -  Clindamycin: S 0.5      -  Daptomycin: S 1      -  Erythromycin: R >4      -  Gentamicin: S <=1 Should not be used as monotherapy      -  Linezolid: S 4      -  Oxacillin: R >2      -  Penicillin: R >8      -  RIF- Rifampin: S <=1 Should not be used as monotherapy      -  Tetra/Doxy: S <=1      -  Trimethoprim/Sulfamethoxazole: S <=0.5/9.5      -  Vancomycin: S 2      Method Type: EROS    Culture - Surgical Swab (collected 07-03-19 @ 19:57)  Source: .Surgical Swab None  Preliminary Report (07-06-19 @ 09:35):    Few Methicillin resistant Staphylococcus aureus  Organism: Methicillin resistant Staphylococcus aureus (07-06-19 @ 09:34)  Organism: Methicillin resistant Staphylococcus aureus (07-06-19 @ 09:34)      -  Ampicillin/Sulbactam: R 16/8      -  Cefazolin: R 8      -  Clindamycin: S 0.5      -  Daptomycin: S 1      -  Erythromycin: R >4      -  Gentamicin: S <=1 Should not be used as monotherapy      -  Linezolid: S 2      -  Oxacillin: R >2      -  Penicillin: R >8      -  RIF- Rifampin: S <=1 Should not be used as monotherapy      -  Tetra/Doxy: S <=1      -  Trimethoprim/Sulfamethoxazole: S <=0.5/9.5      -  Vancomycin: S 2      Method Type: EROS    Culture - Surgical Swab (collected 07-03-19 @ 19:57)  Source: .Surgical Swab None  Preliminary Report (07-06-19 @ 09:37):    Numerous Methicillin resistant Staphylococcus aureus  Organism: Methicillin resistant Staphylococcus aureus (07-06-19 @ 09:36)  Organism: Methicillin resistant Staphylococcus aureus (07-06-19 @ 09:36)      -  Ampicillin/Sulbactam: R >16/8      -  Cefazolin: R 16      -  Clindamycin: S 0.5      -  Daptomycin: S 1      -  Erythromycin: R >4      -  Gentamicin: S <=1 Should not be used as monotherapy      -  Linezolid: S 4      -  Oxacillin: R >2      -  Penicillin: R >8      -  RIF- Rifampin: S <=1 Should not be used as monotherapy      -  Tetra/Doxy: S <=1      -  Trimethoprim/Sulfamethoxazole: S <=0.5/9.5      -  Vancomycin: S 2      Method Type: EROS    Culture - Blood (collected 07-01-19 @ 14:30)  Source: .Blood Blood  Final Report (07-06-19 @ 22:01):    No growth at 5 days.    Culture - Blood (collected 07-01-19 @ 14:02)  Source: .Blood Blood  Final Report (07-07-19 @ 02:08):    No growth at 5 days.            INFECTIOUS DISEASES TESTING      RADIOLOGY & ADDITIONAL TESTS:  I have personally reviewed the last Chest xray  CXR      CT      CARDIOLOGY TESTING  12 Lead ECG:   Ventricular Rate 84 BPM    Atrial Rate 84 BPM    P-R Interval 142 ms    QRS Duration 82 ms    Q-T Interval 366 ms    QTC Calculation(Bezet) 432 ms    P Axis 12 degrees    R Axis 9 degrees    T Axis 10 degrees    Diagnosis Line Normal sinus rhythm  Normal ECG    Confirmed by Hiro Allen ([id number]) on 7/3/2019 9:41:20 AM (07-03-19 @ 08:12)      MEDICATIONS  amLODIPine   Tablet 5  ascorbic acid 500  cefepime   IVPB 1000  chlorhexidine 4% Liquid 1  cyanocobalamin 1000  ferrous    sulfate 325  furosemide   Injectable 40  gabapentin 300  metoprolol tartrate 50  metroNIDAZOLE  IVPB 500  montelukast 10  pantoprazole    Tablet 40  potassium chloride    Tablet ER 40  predniSONE   Tablet 10  sodium chloride 0.9% lock flush 3  tacrolimus 0.5  vancomycin  IVPB 750  warfarin 3      ANTIBIOTICS:  cefepime   IVPB 1000 milliGRAM(s) IV Intermittent every 12 hours  metroNIDAZOLE  IVPB 500 milliGRAM(s) IV Intermittent every 8 hours  vancomycin  IVPB 750 milliGRAM(s) IV Intermittent every 12 hours
DONI PATRICK  74y, Female  Allergy: No Known Allergies      CHIEF COMPLAINT: cellulitis (04 Jul 2019 15:35)      INTERVAL EVENTS/HPI  - No acute events overnight  - T(F): , Max: 96.9 (07-04-19 @ 20:51)  - Denies any worsening symptoms  - Tolerating medication  - WBC Count: 9.14 K/uL (07-05-19 @ 06:41)      ROS  General: Denies fevers, chills, nightsweats, weight loss  HEENT: Denies headache, rhinorrhea, sore throat, eye pain  CV: Denies CP, palpitations  PULM: Denies SOB, cough  GI: Denies abdominal pain, hematochezia/melena  : Denies dysuria, hematuria  MSK: Denies arthralgias, myalgias  SKIN: as noted above   NEURO: Denies paresthesias, weakness  PSYCH: Denies depression, anxiety    FH noncontributory    VITALS:  T(F): 96.2, Max: 96.9 (07-04-19 @ 20:51)  HR: 71  BP: 146/65  RR: 18Vital Signs Last 24 Hrs  T(C): 35.7 (05 Jul 2019 05:45), Max: 36.1 (04 Jul 2019 20:51)  T(F): 96.2 (05 Jul 2019 05:45), Max: 96.9 (04 Jul 2019 20:51)  HR: 71 (05 Jul 2019 05:45) (71 - 81)  BP: 146/65 (05 Jul 2019 05:45) (131/73 - 146/65)  BP(mean): --  RR: 18 (05 Jul 2019 05:45) (18 - 18)  SpO2: 96% (05 Jul 2019 07:33) (96% - 96%)    PHYSICAL EXAM:  Gen: NAD, resting in bed  HEENT: Normocephalic, atraumatic  Neck: supple, no lymphadenopathy  CV: Regular rate & regular rhythm  Lungs: decreased BS at bases, no fremitus  Abdomen: Soft, BS present  Ext: Warm, well perfused, LE dressings  Neuro: non focal, awake  Skin: no rash, no erythema  Lines: no phlebitis      TESTS & MEASUREMENTS:                        11.6   9.14  )-----------( 237      ( 05 Jul 2019 06:41 )             35.9     07-05    137  |  96<L>  |  16  ----------------------------<  181<H>  3.1<L>   |  29  |  1.1    Ca    8.0<L>      05 Jul 2019 06:41  Phos  3.4     07-03  Mg     1.6     07-03    TPro  5.1<L>  /  Alb  2.6<L>  /  TBili  0.5  /  DBili  x   /  AST  11  /  ALT  9   /  AlkPhos  94  07-03    eGFR if Non African American: 49 mL/min/1.73M2 (07-05-19 @ 06:41)  eGFR if : 57 mL/min/1.73M2 (07-05-19 @ 06:41)    LIVER FUNCTIONS - ( 03 Jul 2019 22:50 )  Alb: 2.6 g/dL / Pro: 5.1 g/dL / ALK PHOS: 94 U/L / ALT: 9 U/L / AST: 11 U/L / GGT: x               Culture - Tissue with Gram Stain (collected 07-03-19 @ 19:57)  Source: .Tissue None  Gram Stain (07-04-19 @ 09:23):    No polymorphonuclear leukocytes per low power field    No organisms seen per oil power field    Culture - Blood (collected 07-01-19 @ 14:30)  Source: .Blood Blood  Preliminary Report (07-02-19 @ 22:01):    No growth to date.    Culture - Blood (collected 07-01-19 @ 14:02)  Source: .Blood Blood  Preliminary Report (07-03-19 @ 02:06):    No growth to date.        Blood Gas Venous - Lactate: 1.3 mmoL/L (07-01-19 @ 13:25)  Blood Gas Venous - Lactate: 2.7 mmoL/L (07-01-19 @ 13:06)      INFECTIOUS DISEASES TESTING      RADIOLOGY & ADDITIONAL TESTS:  I have personally reviewed the last Chest xray  CXR      CT      CARDIOLOGY TESTING  12 Lead ECG:   Ventricular Rate 84 BPM    Atrial Rate 84 BPM    P-R Interval 142 ms    QRS Duration 82 ms    Q-T Interval 366 ms    QTC Calculation(Bezet) 432 ms    P Axis 12 degrees    R Axis 9 degrees    T Axis 10 degrees    Diagnosis Line Normal sinus rhythm  Normal ECG    Confirmed by Hiro Allen ([id number]) on 7/3/2019 9:41:20 AM (07-03-19 @ 08:12)      MEDICATIONS  amLODIPine   Tablet 5  ascorbic acid 500  cefepime   IVPB 1000  chlorhexidine 4% Liquid 1  cyanocobalamin 1000  ferrous    sulfate 325  furosemide   Injectable 40  gabapentin 300  lactated ringers. 1000  metoprolol tartrate 50  metroNIDAZOLE  IVPB 500  montelukast 10  predniSONE   Tablet 10  tacrolimus 0.5  vancomycin  IVPB 750      ANTIBIOTICS:  cefepime   IVPB 1000 milliGRAM(s) IV Intermittent every 12 hours  metroNIDAZOLE  IVPB 500 milliGRAM(s) IV Intermittent every 8 hours  vancomycin  IVPB 750 milliGRAM(s) IV Intermittent every 12 hours
DONI PATRICK  74y, Female  Allergy: No Known Allergies      CHIEF COMPLAINT: cellulitis (11 Jul 2019 19:17)      INTERVAL EVENTS/HPI  - No acute events overnight  - T(F): , Max: 98.7 (07-12-19 @ 08:00)  - Denies any worsening symptoms  - Tolerating medication  - WBC Count: 12.93 K/uL (07-11-19 @ 16:30)      ROS  General: Denies fevers, chills, nightsweats, weight loss  HEENT: Denies headache, rhinorrhea, sore throat, eye pain  CV: Denies CP, palpitations  PULM: Denies SOB, cough  GI: Denies abdominal pain, diarrhea  : Denies dysuria, hematuria  MSK: Denies arthralgias  SKIN: Denies rash   NEURO: Denies paresthesias, weakness  PSYCH: Denies depression    FH noncontributory     VITALS:  T(F): 98.7, Max: 98.7 (07-12-19 @ 08:00)  HR: 70  BP: 148/70  RR: 18Vital Signs Last 24 Hrs  T(C): 37.1 (12 Jul 2019 08:00), Max: 37.1 (12 Jul 2019 08:00)  T(F): 98.7 (12 Jul 2019 08:00), Max: 98.7 (12 Jul 2019 08:00)  HR: 70 (12 Jul 2019 08:00) (68 - 97)  BP: 148/70 (12 Jul 2019 08:00) (116/67 - 148/70)  BP(mean): --  RR: 18 (12 Jul 2019 08:00) (18 - 18)  SpO2: 95% (11 Jul 2019 23:07) (95% - 95%)    PHYSICAL EXAM:  Gen: NAD, resting in bed  HEENT: Normocephalic, atraumatic  Neck: supple, no lymphadenopathy  CV: Regular rate & regular rhythm  Lungs: decreased BS at bases, no fremitus  Abdomen: Soft, BS present  Ext: Warm, well perfused  Neuro: non focal, awake  Skin: no rash, no erythema      TESTS & MEASUREMENTS:                        12.1   12.93 )-----------( 302      ( 11 Jul 2019 16:30 )             38.1     07-11    139  |  99  |  22<H>  ----------------------------<  176<H>  4.3   |  28  |  1.5    Ca    8.4<L>      11 Jul 2019 16:30  Phos  4.2     07-10  Mg     2.1     07-11      eGFR if Non African American: 34 mL/min/1.73M2 (07-11-19 @ 16:30)  eGFR if : 39 mL/min/1.73M2 (07-11-19 @ 16:30)          Culture - Acid Fast - Other w/Smear (collected 07-09-19 @ 13:54)  Source: .Other None    Culture - Fungal, Other (collected 07-09-19 @ 13:54)  Source: .Other None  Preliminary Report (07-10-19 @ 08:38):    Testing in progress    Culture - Surgical Swab (collected 07-09-19 @ 13:54)  Source: .Surgical Swab None  Preliminary Report (07-11-19 @ 22:48):    Rare Gram Positive Cocci    Culture - Fungal, Other (collected 07-09-19 @ 13:45)  Source: .Other None  Preliminary Report (07-10-19 @ 08:38):    Testing in progress    Culture - Acid Fast - Other w/Smear (collected 07-09-19 @ 13:45)  Source: .Other None    Culture - Surgical Swab (collected 07-09-19 @ 13:45)  Source: .Surgical Swab None  Preliminary Report (07-10-19 @ 21:26):    No growth            INFECTIOUS DISEASES TESTING  Hepatitis C Virus Interpretation: Nonreact (07-02-19 @ 04:30)      RADIOLOGY & ADDITIONAL TESTS:  I have personally reviewed the last Chest xray  CXR      CT      CARDIOLOGY TESTING  12 Lead ECG:   Ventricular Rate 84 BPM    Atrial Rate 84 BPM    P-R Interval 142 ms    QRS Duration 82 ms    Q-T Interval 366 ms    QTC Calculation(Bezet) 432 ms    P Axis 12 degrees    R Axis 9 degrees    T Axis 10 degrees    Diagnosis Line Normal sinus rhythm  Normal ECG    Confirmed by Hiro Allen ([id number]) on 7/3/2019 9:41:20 AM (07-03-19 @ 08:12)      MEDICATIONS  amLODIPine   Tablet 5  ascorbic acid 500  chlorhexidine 4% Liquid 1  cyanocobalamin 1000  docusate sodium 100  ferrous    sulfate 325  furosemide   Injectable 40  gabapentin 300  lactated ringers. 1000  metoprolol tartrate 50  montelukast 10  pantoprazole    Tablet 40  predniSONE   Tablet 10  senna 2  silver sulfADIAZINE 1% Cream 1  sodium chloride 0.9% lock flush 3  tacrolimus 0.5      ANTIBIOTICS:
DONI PATRICK  74y, Female  Allergy: No Known Allergies      CHIEF COMPLAINT: cellulitis (14 Jul 2019 13:22)      INTERVAL EVENTS/HPI  - No acute events overnight  - T(F): , Max: 98 (07-15-19 @ 07:42)  - Denies any worsening symptoms  - Tolerating medication  - WBC Count: 12.15 K/uL (07-14-19 @ 16:26)      ROS  General: Denies fevers, chills, nightsweats, weight loss  HEENT: Denies headache, rhinorrhea, sore throat, eye pain  CV: Denies CP, palpitations  PULM: Denies SOB, cough  GI: Denies abdominal pain, diarrhea  : Denies dysuria, hematuria  MSK: Denies arthralgias  SKIN: Denies rash   NEURO: Denies paresthesias, weakness  PSYCH: Denies depression    FH noncontributory     VITALS:  T(F): 98, Max: 98 (07-15-19 @ 07:42)  HR: 70  BP: 142/84  RR: 18Vital Signs Last 24 Hrs  T(C): 36.7 (15 Jul 2019 07:42), Max: 36.7 (15 Jul 2019 07:42)  T(F): 98 (15 Jul 2019 07:42), Max: 98 (15 Jul 2019 07:42)  HR: 70 (15 Jul 2019 07:42) (68 - 70)  BP: 142/84 (15 Jul 2019 07:42) (127/70 - 159/74)  BP(mean): --  RR: 18 (15 Jul 2019 07:42) (18 - 18)  SpO2: 96% (14 Jul 2019 23:40) (96% - 97%)    PHYSICAL EXAM:  Gen: NAD, resting in bed  HEENT: Normocephalic, atraumatic  Neck: supple, no lymphadenopathy  CV: Regular rate & regular rhythm  Lungs: decreased BS at bases, no fremitus  Abdomen: Soft, BS present  Ext: Warm, well perfused  Neuro: non focal, awake  Skin: no rash, no erythema      TESTS & MEASUREMENTS:                        11.5   12.15 )-----------( 329      ( 14 Jul 2019 16:26 )             37.4     07-14    145  |  99  |  20  ----------------------------<  108<H>  4.4   |  29  |  1.2    Ca    8.6      14 Jul 2019 16:26  Phos  3.5     07-13  Mg     1.7     07-14      eGFR if Non African American: 44 mL/min/1.73M2 (07-14-19 @ 16:26)  eGFR if : 52 mL/min/1.73M2 (07-14-19 @ 16:26)          Culture - Abscess with Gram Stain (collected 07-12-19 @ 16:40)  Source: .Abscess None  Preliminary Report (07-14-19 @ 09:27):    Few Staphylococcus aureus    Culture - Acid Fast - Other w/Smear (collected 07-09-19 @ 13:54)  Source: .Other None  Preliminary Report (07-13-19 @ 15:03):    Culture is being performed.    Culture - Fungal, Other (collected 07-09-19 @ 13:54)  Source: .Other None  Preliminary Report (07-10-19 @ 08:38):    Testing in progress    Culture - Surgical Swab (collected 07-09-19 @ 13:54)  Source: .Surgical Swab None  Final Report (07-14-19 @ 23:28):    Rare Coag Negative Staphylococcus  Organism: Coag Negative Staphylococcus (07-14-19 @ 23:28)  Organism: Coag Negative Staphylococcus (07-14-19 @ 23:28)      -  Ampicillin/Sulbactam: R <=8/4      -  Cefazolin: R <=4      -  Clindamycin: S 0.5      -  Erythromycin: R >4      -  Gentamicin: R >8 Should not be used as monotherapy      -  Oxacillin: R >2      -  Penicillin: R >8      -  RIF- Rifampin: S <=1 Should not be used as monotherapy      -  Tetra/Doxy: R >8      -  Trimethoprim/Sulfamethoxazole: S <=0.5/9.5      -  Vancomycin: S 2      Method Type: EROS    Culture - Fungal, Other (collected 07-09-19 @ 13:45)  Source: .Other None  Preliminary Report (07-10-19 @ 08:38):    Testing in progress    Culture - Acid Fast - Other w/Smear (collected 07-09-19 @ 13:45)  Source: .Other None  Preliminary Report (07-13-19 @ 15:03):    Culture is being performed.    Culture - Surgical Swab (collected 07-09-19 @ 13:45)  Source: .Surgical Swab None  Final Report (07-14-19 @ 23:02):    No growth at 5 days            INFECTIOUS DISEASES TESTING  Hepatitis C Virus Interpretation: Nonreact (07-02-19 @ 04:30)      RADIOLOGY & ADDITIONAL TESTS:  I have personally reviewed the last Chest xray  CXR      CT      CARDIOLOGY TESTING  12 Lead ECG:   Ventricular Rate 84 BPM    Atrial Rate 84 BPM    P-R Interval 142 ms    QRS Duration 82 ms    Q-T Interval 366 ms    QTC Calculation(Bezet) 432 ms    P Axis 12 degrees    R Axis 9 degrees    T Axis 10 degrees    Diagnosis Line Normal sinus rhythm  Normal ECG    Confirmed by Hiro Allen ([id number]) on 7/3/2019 9:41:20 AM (07-03-19 @ 08:12)      MEDICATIONS  amLODIPine   Tablet 5  ascorbic acid 500  chlorhexidine 4% Liquid 1  cyanocobalamin 1000  docusate sodium 100  ferrous    sulfate 325  furosemide   Injectable 40  gabapentin 300  metoprolol tartrate 50  montelukast 10  mupirocin 2% Ointment 1  pantoprazole    Tablet 40  predniSONE   Tablet 10  senna 2  silver sulfADIAZINE 1% Cream 1  sodium chloride 0.9% lock flush 3  tacrolimus 0.5  vancomycin  IVPB 500      ANTIBIOTICS:  vancomycin  IVPB 500 milliGRAM(s) IV Intermittent every 12 hours
DONI PATRICK  74y, Female  Allergy: No Known Allergies      CHIEF COMPLAINT: cellulitis (15 Jul 2019 19:32)      INTERVAL EVENTS/HPI  - No acute events overnight  - T(F): , Max: 98 (07-15-19 @ 07:42)  - Denies any worsening symptoms  - Tolerating medication  - WBC Count: 10.11 K/uL (07-15-19 @ 16:48)      ROS  General: Denies fevers, chills, nightsweats, weight loss  HEENT: Denies headache, rhinorrhea, sore throat, eye pain  CV: Denies CP, palpitations  PULM: Denies SOB, cough  GI: Denies abdominal pain, diarrhea  : Denies dysuria, hematuria  MSK: Denies arthralgias  SKIN: Denies rash   NEURO: Denies paresthesias, weakness  PSYCH: Denies depression    FH noncontributory   SH noncontributory     VITALS:  T(F): 96.5, Max: 98 (07-15-19 @ 07:42)  HR: 74  BP: 145/70  RR: 18Vital Signs Last 24 Hrs  T(C): 35.8 (15 Jul 2019 23:46), Max: 36.7 (15 Jul 2019 07:42)  T(F): 96.5 (15 Jul 2019 23:46), Max: 98 (15 Jul 2019 07:42)  HR: 74 (16 Jul 2019 05:50) (63 - 74)  BP: 145/70 (16 Jul 2019 05:50) (139/75 - 152/70)  BP(mean): --  RR: 18 (16 Jul 2019 05:50) (18 - 18)  SpO2: 96% (15 Jul 2019 23:46) (96% - 96%)    PHYSICAL EXAM:  Gen: NAD, resting in bed  HEENT: Normocephalic, atraumatic  Neck: supple, no lymphadenopathy  CV: Regular rate & regular rhythm  Lungs: decreased BS at bases, no fremitus  Abdomen: Soft, BS present  Ext: Warm, well perfused  Neuro: non focal, awake  Skin: no rash, no erythema      TESTS & MEASUREMENTS:                        11.8   10.11 )-----------( 312      ( 15 Jul 2019 16:48 )             38.4     07-15    141  |  99  |  19  ----------------------------<  151<H>  4.8   |  30  |  1.1    Ca    8.1<L>      15 Jul 2019 16:48  Phos  4.3     07-15  Mg     2.2     07-15      eGFR if Non African American: 49 mL/min/1.73M2 (07-15-19 @ 16:48)  eGFR if : 57 mL/min/1.73M2 (07-15-19 @ 16:48)          Culture - Abscess with Gram Stain (collected 07-12-19 @ 16:40)  Source: .Abscess None  Preliminary Report (07-15-19 @ 11:27):    Few Methicillin resistant Staphylococcus aureus  Organism: Methicillin resistant Staphylococcus aureus (07-15-19 @ 11:26)  Organism: Methicillin resistant Staphylococcus aureus (07-15-19 @ 11:26)      -  Ampicillin/Sulbactam: R 16/8      -  Cefazolin: R <=4      -  Clindamycin: S 0.5      -  Daptomycin: S 0.5      -  Erythromycin: R >4      -  Gentamicin: S <=1 Should not be used as monotherapy      -  Linezolid: S 2      -  Oxacillin: R >2      -  Penicillin: R >8      -  RIF- Rifampin: S <=1 Should not be used as monotherapy      -  Tetra/Doxy: S <=1      -  Trimethoprim/Sulfamethoxazole: S <=0.5/9.5      -  Vancomycin: S 2      Method Type: EROS    Culture - Acid Fast - Other w/Smear (collected 07-09-19 @ 13:54)  Source: .Other None  Preliminary Report (07-13-19 @ 15:03):    Culture is being performed.    Culture - Fungal, Other (collected 07-09-19 @ 13:54)  Source: .Other None  Preliminary Report (07-10-19 @ 08:38):    Testing in progress    Culture - Surgical Swab (collected 07-09-19 @ 13:54)  Source: .Surgical Swab None  Final Report (07-14-19 @ 23:28):    Rare Coag Negative Staphylococcus  Organism: Coag Negative Staphylococcus (07-14-19 @ 23:28)  Organism: Coag Negative Staphylococcus (07-14-19 @ 23:28)      -  Ampicillin/Sulbactam: R <=8/4      -  Cefazolin: R <=4      -  Clindamycin: S 0.5      -  Erythromycin: R >4      -  Gentamicin: R >8 Should not be used as monotherapy      -  Oxacillin: R >2      -  Penicillin: R >8      -  RIF- Rifampin: S <=1 Should not be used as monotherapy      -  Tetra/Doxy: R >8      -  Trimethoprim/Sulfamethoxazole: S <=0.5/9.5      -  Vancomycin: S 2      Method Type: EROS    Culture - Fungal, Other (collected 07-09-19 @ 13:45)  Source: .Other None  Preliminary Report (07-10-19 @ 08:38):    Testing in progress    Culture - Acid Fast - Other w/Smear (collected 07-09-19 @ 13:45)  Source: .Other None  Preliminary Report (07-13-19 @ 15:03):    Culture is being performed.    Culture - Surgical Swab (collected 07-09-19 @ 13:45)  Source: .Surgical Swab None  Final Report (07-14-19 @ 23:02):    No growth at 5 days            INFECTIOUS DISEASES TESTING  Hepatitis C Virus Interpretation: Nonreact (07-02-19 @ 04:30)      RADIOLOGY & ADDITIONAL TESTS:  I have personally reviewed the last Chest xray  CXR      CT      CARDIOLOGY TESTING  12 Lead ECG:   Ventricular Rate 84 BPM    Atrial Rate 84 BPM    P-R Interval 142 ms    QRS Duration 82 ms    Q-T Interval 366 ms    QTC Calculation(Bezet) 432 ms    P Axis 12 degrees    R Axis 9 degrees    T Axis 10 degrees    Diagnosis Line Normal sinus rhythm  Normal ECG    Confirmed by Hiro Allen ([id number]) on 7/3/2019 9:41:20 AM (07-03-19 @ 08:12)      MEDICATIONS  amLODIPine   Tablet 5  ascorbic acid 500  chlorhexidine 4% Liquid 1  cyanocobalamin 1000  docusate sodium 100  ferrous    sulfate 325  furosemide    Tablet 40  gabapentin 300  metoprolol tartrate 50  montelukast 10  mupirocin 2% Ointment 1  pantoprazole    Tablet 40  predniSONE   Tablet 10  senna 2  silver sulfADIAZINE 1% Cream 1  sodium chloride 0.9% lock flush 3  tacrolimus 0.5  vancomycin  IVPB 500      ANTIBIOTICS:  vancomycin  IVPB 500 milliGRAM(s) IV Intermittent every 12 hours
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 Reports symptoms started on Saturday morning and progressively worsened. Also reports significant pain as well as worsening edema, chills, subjective fevers, and nausea day PTP. Patient reports she hit her L leg on a chair and cut herself on Friday night, though denies any trauma to R side. No insects or tick bites. Denies any drainage or purulence to RLE. Denies any other complaints or symptoms. No rash anywhere else on body. Patient has a history of LE cellulitis that has required Burn debridement in the past. (01 Jul 2019 16:00)    MEDICATIONS  (STANDING):  amLODIPine   Tablet 5 milliGRAM(s) Oral daily  ascorbic acid 500 milliGRAM(s) Oral daily  cefepime   IVPB 1000 milliGRAM(s) IV Intermittent every 12 hours  chlorhexidine 4% Liquid 1 Application(s) Topical <User Schedule>  cyanocobalamin 1000 MICROGram(s) Oral daily  ferrous    sulfate 325 milliGRAM(s) Oral daily  furosemide   Injectable 40 milliGRAM(s) IV Push daily  gabapentin 300 milliGRAM(s) Oral three times a day  lactated ringers. 1000 milliLiter(s) (100 mL/Hr) IV Continuous <Continuous>  metoprolol tartrate 50 milliGRAM(s) Oral two times a day  metroNIDAZOLE  IVPB 500 milliGRAM(s) IV Intermittent every 8 hours  montelukast 10 milliGRAM(s) Oral at bedtime  predniSONE   Tablet 10 milliGRAM(s) Oral daily  tacrolimus 0.5 milliGRAM(s) Oral every 12 hours  vancomycin  IVPB 750 milliGRAM(s) IV Intermittent every 12 hours    MEDICATIONS  (PRN):  acetaminophen   Tablet .. 650 milliGRAM(s) Oral every 4 hours PRN Mild Pain (1 - 3)  acetaminophen   Tablet .. 650 milliGRAM(s) Oral every 6 hours PRN Temp greater or equal to 38.5C (101.3F)  morphine  - Injectable 2 milliGRAM(s) IV Push every 3 hours PRN Severe Pain (7 - 10)  ondansetron    Tablet 4 milliGRAM(s) Oral every 8 hours PRN Nausea and/or Vomiting    INTERVAL EVENTS: Patient seen today family at bedside. Patient not feeling well, nauseous.    T(C): 35.7 (07-05-19 @ 05:45), Max: 36.1 (07-04-19 @ 20:51)  HR: 71 (07-05-19 @ 05:45) (71 - 81)  BP: 146/65 (07-05-19 @ 05:45) (131/73 - 146/65)  RR: 18 (07-05-19 @ 05:45) (18 - 18)  SpO2: 96% (07-05-19 @ 07:33) (96% - 96%)  Wt(kg): --Vital Signs Last 24 Hrs  T(C): 35.7 (05 Jul 2019 05:45), Max: 36.1 (04 Jul 2019 20:51)  T(F): 96.2 (05 Jul 2019 05:45), Max: 96.9 (04 Jul 2019 20:51)  HR: 71 (05 Jul 2019 05:45) (71 - 81)  BP: 146/65 (05 Jul 2019 05:45) (131/73 - 146/65)  BP(mean): --  RR: 18 (05 Jul 2019 05:45) (18 - 18)  SpO2: 96% (05 Jul 2019 07:33) (96% - 96%)    PHYSICAL EXAM:  GENERAL: NAD  NECK: Supple, No JVD  CHEST/LUNG: Occasional wheeze  HEART: S1, S2, Regular rate and rhythm  ABDOMEN: Soft, Mild epigatric tenderness, Bowel sounds present  EXTREMITIES: Trace edema, Left leg dressing intact      LABS:                        11.6   9.14  )-----------( 237      ( 05 Jul 2019 06:41 )             35.9             07-05    137  |  96<L>  |  16  ----------------------------<  181<H>  3.1<L>   |  29  |  1.1    Ca    8.0<L>      05 Jul 2019 06:41  Phos  3.4     07-03  Mg     1.6     07-03    TPro  5.1<L>  /  Alb  2.6<L>  /  TBili  0.5  /  DBili  x   /  AST  11  /  ALT  9   /  AlkPhos  94  07-03    LIVER FUNCTIONS - ( 03 Jul 2019 22:50 )  Alb: 2.6 g/dL / Pro: 5.1 g/dL / ALK PHOS: 94 U/L / ALT: 9 U/L / AST: 11 U/L / GGT: x                   PT/INR - ( 05 Jul 2019 06:41 )   PT: 33.20 sec;   INR: 2.92 ratio      PTT - ( 05 Jul 2019 06:41 )  PTT:35.9 sec        Culture - Tissue with Gram Stain (collected 03 Jul 2019 19:57)  Source: .Tissue None  Gram Stain (04 Jul 2019 09:23):    No polymorphonuclear leukocytes per low power field    No organisms seen per oil power field  Preliminary Report (05 Jul 2019 10:32):    Few Gram positive organisms    Culture - Surgical Swab (collected 03 Jul 2019 19:57)  Source: .Surgical Swab None  Preliminary Report (05 Jul 2019 09:47):    Numerous Staphylococcus aureus    Culture - Surgical Swab (collected 03 Jul 2019 19:57)  Source: .Surgical Swab None  Preliminary Report (05 Jul 2019 10:09):    Few Staphylococcus aureus    Culture - Surgical Swab (collected 03 Jul 2019 19:57)  Source: .Surgical Swab None  Preliminary Report (05 Jul 2019 09:58):    Numerous Staphylococcus aureus      RADIOLOGY & ADDITIONAL TESTS:
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 Reports symptoms started on Saturday morning and progressively worsened. Also reports significant pain as well as worsening edema, chills, subjective fevers, and nausea day PTP. Patient reports she hit her L leg on a chair and cut herself on Friday night, though denies any trauma to R side. No insects or tick bites. Denies any drainage or purulence to RLE. Denies any other complaints or symptoms. No rash anywhere else on body. Patient has a history of LE cellulitis that has required Burn debridement in the past. (01 Jul 2019 16:00)    MEDICATIONS  (STANDING):  amLODIPine   Tablet 5 milliGRAM(s) Oral daily  ascorbic acid 500 milliGRAM(s) Oral daily  ceFAZolin   IVPB 1000 milliGRAM(s) IV Intermittent every 8 hours  chlorhexidine 4% Liquid 1 Application(s) Topical <User Schedule>  cyanocobalamin 1000 MICROGram(s) Oral daily  enoxaparin Injectable 30 milliGRAM(s) SubCutaneous two times a day  ferrous    sulfate 325 milliGRAM(s) Oral daily  furosemide   Injectable 40 milliGRAM(s) IV Push daily  gabapentin 300 milliGRAM(s) Oral three times a day  lactated ringers. 1000 milliLiter(s) (100 mL/Hr) IV Continuous <Continuous>  metoprolol tartrate 50 milliGRAM(s) Oral two times a day  montelukast 10 milliGRAM(s) Oral at bedtime  ondansetron Injectable 4 milliGRAM(s) IV Push once  predniSONE   Tablet 10 milliGRAM(s) Oral daily  tacrolimus 0.5 milliGRAM(s) Oral every 12 hours    MEDICATIONS  (PRN):  acetaminophen   Tablet .. 650 milliGRAM(s) Oral every 4 hours PRN Mild Pain (1 - 3)  acetaminophen   Tablet .. 650 milliGRAM(s) Oral every 6 hours PRN Temp greater or equal to 38.5C (101.3F)  morphine  - Injectable 2 milliGRAM(s) IV Push every 3 hours PRN Severe Pain (7 - 10)  ondansetron    Tablet 4 milliGRAM(s) Oral every 8 hours PRN Nausea and/or Vomiting    INTERVAL EVENTS: Patient seen today without distress, RLE dressing intact. Patient was debrided by burn team in the OR yesterday.    T(C): 36.2 (07-04-19 @ 06:55), Max: 36.7 (07-03-19 @ 14:10)  HR: 88 (07-04-19 @ 06:55) (81 - 96)  BP: 140/76 (07-04-19 @ 06:55) (130/72 - 145/80)  RR: 16 (07-04-19 @ 06:55) (14 - 20)  SpO2: 97% (07-04-19 @ 07:50) (96% - 99%)  Wt(kg): --Vital Signs Last 24 Hrs  T(C): 36.2 (04 Jul 2019 06:55), Max: 36.7 (03 Jul 2019 14:10)  T(F): 97.2 (04 Jul 2019 06:55), Max: 98.1 (03 Jul 2019 14:10)  HR: 88 (04 Jul 2019 06:55) (81 - 96)  BP: 140/76 (04 Jul 2019 06:55) (130/72 - 145/80)  BP(mean): --  RR: 16 (04 Jul 2019 06:55) (14 - 20)  SpO2: 97% (04 Jul 2019 07:50) (96% - 99%)    PHYSICAL EXAM:  GENERAL: NAD  NECK: Supple, No JVD  CHEST/LUNG: Clear  HEART: S1, S2, Regular rate and rhythm  ABDOMEN: Soft, Nontender, Bowel sounds present  EXTREMITIES: + edema, decreased erythema, warmth and tenderness  SKIN: RLE dressing intact, wound to the left calf    LABS:  Labs:                        12.7   13.08 )-----------( 235      ( 03 Jul 2019 22:50 )             38.8             07-03    141  |  98  |  17  ----------------------------<  112<H>  3.4<L>   |  28  |  1.0    Ca    8.3<L>      03 Jul 2019 22:50  Phos  3.4     07-03  Mg     1.6     07-03    TPro  5.1<L>  /  Alb  2.6<L>  /  TBili  0.5  /  DBili  x   /  AST  11  /  ALT  9   /  AlkPhos  94  07-03    LIVER FUNCTIONS - ( 03 Jul 2019 22:50 )  Alb: 2.6 g/dL / Pro: 5.1 g/dL / ALK PHOS: 94 U/L / ALT: 9 U/L / AST: 11 U/L / GGT: x                   PT/INR - ( 04 Jul 2019 07:33 )   PT: 32.60 sec;   INR: 2.86 ratio         PTT - ( 03 Jul 2019 22:50 )  PTT:36.4 sec        Culture - Tissue with Gram Stain (collected 03 Jul 2019 19:57)  Source: .Tissue None  Gram Stain (04 Jul 2019 09:23):    No polymorphonuclear leukocytes per low power field    No organisms seen per oil power field    Culture - Blood (collected 01 Jul 2019 14:30)  Source: .Blood Blood  Preliminary Report (02 Jul 2019 22:01):    No growth to date.    Culture - Blood (collected 01 Jul 2019 14:02)  Source: .Blood Blood  Preliminary Report (03 Jul 2019 02:06):    No growth to date.      RADIOLOGY & ADDITIONAL TESTS:  < from: CT Lower Extremity No Cont, Right (07.01.19 @ 13:09) >    Impression:  1. Leg cellulitis without evidence of necrotizing fasciitis  2. Small knee effusion with mild tricompartmental degenerative change  3. Midfoot and hindfoot degenerative change with vascular calcifications   consistent with neuropathic osteoarthropathy    < end of copied text >
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 Reports symptoms started on Saturday morning and progressively worsened. Also reports significant pain as well as worsening edema, chills, subjective fevers, and nausea day PTP. Patient reports she hit her L leg on a chair and cut herself on Friday night, though denies any trauma to R side. No insects or tick bites. Denies any drainage or purulence to RLE. Denies any other complaints or symptoms. No rash anywhere else on body. Patient has a history of LE cellulitis that has required Burn debridement in the past. (01 Jul 2019 16:00)    MEDICATIONS  (STANDING):  amLODIPine   Tablet 5 milliGRAM(s) Oral daily  ascorbic acid 500 milliGRAM(s) Oral daily  cefepime   IVPB 1000 milliGRAM(s) IV Intermittent every 12 hours  chlorhexidine 4% Liquid 1 Application(s) Topical <User Schedule>  cyanocobalamin 1000 MICROGram(s) Oral daily  ferrous    sulfate 325 milliGRAM(s) Oral daily  furosemide   Injectable 40 milliGRAM(s) IV Push daily  gabapentin 300 milliGRAM(s) Oral three times a day  metoprolol tartrate 50 milliGRAM(s) Oral two times a day  metroNIDAZOLE  IVPB 500 milliGRAM(s) IV Intermittent every 8 hours  montelukast 10 milliGRAM(s) Oral at bedtime  pantoprazole    Tablet 40 milliGRAM(s) Oral before breakfast  potassium chloride    Tablet ER 20 milliEquivalent(s) Oral daily  predniSONE   Tablet 10 milliGRAM(s) Oral daily  sodium chloride 0.9% lock flush 3 milliLiter(s) IV Push every 8 hours  tacrolimus 0.5 milliGRAM(s) Oral every 12 hours  vancomycin  IVPB 750 milliGRAM(s) IV Intermittent every 12 hours    MEDICATIONS  (PRN):  acetaminophen   Tablet .. 650 milliGRAM(s) Oral every 4 hours PRN Mild Pain (1 - 3)  acetaminophen   Tablet .. 650 milliGRAM(s) Oral every 6 hours PRN Temp greater or equal to 38.5C (101.3F)  morphine  - Injectable 2 milliGRAM(s) IV Push every 3 hours PRN Severe Pain (7 - 10)  ondansetron    Tablet 4 milliGRAM(s) Oral every 8 hours PRN Nausea and/or Vomiting    INTERVAL EVENTS: Patient seen today still not feel well, leg hurting more? Denies nausea. Remains afebrile.    T(C): 35.9 (07-06-19 @ 04:35), Max: 36.4 (07-05-19 @ 21:45)  HR: 75 (07-06-19 @ 04:35) (74 - 79)  BP: 133/70 (07-06-19 @ 04:35) (133/70 - 143/63)  RR: 18 (07-06-19 @ 04:35) (18 - 18)  SpO2: --  Wt(kg): --Vital Signs Last 24 Hrs  T(C): 35.9 (06 Jul 2019 04:35), Max: 36.4 (05 Jul 2019 21:45)  T(F): 96.6 (06 Jul 2019 04:35), Max: 97.6 (05 Jul 2019 21:45)  HR: 75 (06 Jul 2019 04:35) (74 - 79)  BP: 133/70 (06 Jul 2019 04:35) (133/70 - 143/63)  BP(mean): --  RR: 18 (06 Jul 2019 04:35) (18 - 18)  SpO2: --    PHYSICAL EXAM:  GENERAL: NAD  NECK: Supple, No JVD  CHEST/LUNG: Clear; No wheezing  HEART: S1, S2, Regular rate and rhythm  ABDOMEN: Soft, Nontender,  Bowel sounds present  EXTREMITIES: + edema, RLE dressing in place, some induration palpated behind the knee, area is tender to touch, distal swelling noted      LABS:                        12.3   8.61  )-----------( 282      ( 06 Jul 2019 08:23 )             39.3             07-06    142  |  99  |  14  ----------------------------<  151<H>  3.4<L>   |  31  |  1.1    Ca    8.6      06 Jul 2019 08:23     Vancomycin Level, Trough: 14.4: Vancomycin trough levels should be rapidly reached and maintained at  15-20 ug/ml for life threatening MRSA  infections such as sepsis, endocarditis, osteomyelitis and pneumonia. A  first trough level should be drawn  before the 3rd or 4th dose.  Risk of renal toxicity is increased for levels >15 ug/ml, in patients on  other nephrotoxic drugs, who are  hemodynamically unstable, have unstable renal function, or are on  Vancomycin therapy for >14 days. Renal function with  creatinine levels should be monitored for those patients. ug/mL (07.06.19 @ 08:23)      PT/INR - ( 05 Jul 2019 06:41 )   PT: 33.20 sec;   INR: 2.92 ratio      PTT - ( 06 Jul 2019 08:23 )  PTT:34.7 sec      Culture - Tissue with Gram Stain (collected 03 Jul 2019 19:57)  Source: .Tissue None  Gram Stain (04 Jul 2019 09:23):    No polymorphonuclear leukocytes per low power field    No organisms seen per oil power field  Preliminary Report (06 Jul 2019 10:22):    Few Coag Negative Staphylococcus    Few Enterococcus faecalis    Few Alpha hemolytic strep "Susceptibilities not performed"    Culture - Surgical Swab (collected 03 Jul 2019 19:57)  Source: .Surgical Swab None  Preliminary Report (06 Jul 2019 09:39):    Numerous Methicillin resistant Staphylococcus aureus  Organism: Methicillin resistant Staphylococcus aureus (06 Jul 2019 09:38)  Organism: Methicillin resistant Staphylococcus aureus (06 Jul 2019 09:38)    Culture - Surgical Swab (collected 03 Jul 2019 19:57)  Source: .Surgical Swab None  Preliminary Report (06 Jul 2019 09:35):    Few Methicillin resistant Staphylococcus aureus  Organism: Methicillin resistant Staphylococcus aureus (06 Jul 2019 09:34)  Organism: Methicillin resistant Staphylococcus aureus (06 Jul 2019 09:34)    Culture - Surgical Swab (collected 03 Jul 2019 19:57)  Source: .Surgical Swab None  Preliminary Report (06 Jul 2019 09:37):    Numerous Methicillin resistant Staphylococcus aureus  Organism: Methicillin resistant Staphylococcus aureus (06 Jul 2019 09:36)  Organism: Methicillin resistant Staphylococcus aureus (06 Jul 2019 09:36)      RADIOLOGY & ADDITIONAL TESTS:
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 Reports symptoms started on Saturday morning and progressively worsened. Also reports significant pain as well as worsening edema, chills, subjective fevers, and nausea day PTP. Patient reports she hit her L leg on a chair and cut herself on Friday night, though denies any trauma to R side. No insects or tick bites. Denies any drainage or purulence to RLE. Denies any other complaints or symptoms. No rash anywhere else on body. Patient has a history of LE cellulitis that has required Burn debridement in the past. (01 Jul 2019 16:00)    MEDICATIONS  (STANDING):  amLODIPine   Tablet 5 milliGRAM(s) Oral daily  ascorbic acid 500 milliGRAM(s) Oral daily  cefepime   IVPB 1000 milliGRAM(s) IV Intermittent every 12 hours  chlorhexidine 4% Liquid 1 Application(s) Topical <User Schedule>  cyanocobalamin 1000 MICROGram(s) Oral daily  ferrous    sulfate 325 milliGRAM(s) Oral daily  furosemide   Injectable 40 milliGRAM(s) IV Push daily  gabapentin 300 milliGRAM(s) Oral three times a day  metoprolol tartrate 50 milliGRAM(s) Oral two times a day  metroNIDAZOLE  IVPB 500 milliGRAM(s) IV Intermittent every 8 hours  montelukast 10 milliGRAM(s) Oral at bedtime  pantoprazole    Tablet 40 milliGRAM(s) Oral before breakfast  potassium chloride    Tablet ER 20 milliEquivalent(s) Oral daily  predniSONE   Tablet 10 milliGRAM(s) Oral daily  sodium chloride 0.9% lock flush 3 milliLiter(s) IV Push every 8 hours  tacrolimus 0.5 milliGRAM(s) Oral every 12 hours  vancomycin  IVPB 750 milliGRAM(s) IV Intermittent every 12 hours    MEDICATIONS  (PRN):  acetaminophen   Tablet .. 650 milliGRAM(s) Oral every 4 hours PRN Mild Pain (1 - 3)  acetaminophen   Tablet .. 650 milliGRAM(s) Oral every 6 hours PRN Temp greater or equal to 38.5C (101.3F)  morphine  - Injectable 2 milliGRAM(s) IV Push every 3 hours PRN Severe Pain (7 - 10)  ondansetron    Tablet 4 milliGRAM(s) Oral every 8 hours PRN Nausea and/or Vomiting    INTERVAL EVENTS: Patient seen today without distress, concerned because she still has a lot of pain to the RLE. Patient states the nurse stated there was "pus" during the dressing change. Patient with recurrent nausea.    T(C): 35.9 (07-07-19 @ 05:12), Max: 36.6 (07-06-19 @ 13:49)  HR: 73 (07-07-19 @ 05:12) (73 - 76)  BP: 169/77 (07-07-19 @ 05:12) (129/67 - 169/77)  RR: 16 (07-07-19 @ 05:12) (16 - 18)  SpO2: 97% (07-07-19 @ 05:12) (97% - 97%)  Wt(kg): --Vital Signs Last 24 Hrs  T(C): 35.9 (07 Jul 2019 05:12), Max: 36.6 (06 Jul 2019 13:49)  T(F): 96.6 (07 Jul 2019 05:12), Max: 97.9 (06 Jul 2019 13:49)  HR: 73 (07 Jul 2019 05:12) (73 - 76)  BP: 169/77 (07 Jul 2019 05:12) (129/67 - 169/77)  BP(mean): --  RR: 16 (07 Jul 2019 05:12) (16 - 18)  SpO2: 97% (07 Jul 2019 05:12) (97% - 97%)    PHYSICAL EXAM:  GENERAL: NAD  NECK: Supple, No JVD  CHEST/LUNG: Clear; No wheezing  HEART: S1, S2, Regular rate and rhythm  ABDOMEN: Soft, Nontender,  Bowel sounds present  EXTREMITIES:  + edema, Dressing intact to RLE, + tenderness when touched    LABS:  Labs:                        12.2   10.19 )-----------( 317      ( 07 Jul 2019 07:11 )             39.1             07-07    140  |  98  |  15  ----------------------------<  173<H>  3.4<L>   |  29  |  1.1    Ca    8.4<L>      07 Jul 2019 07:11                PT/INR - ( 07 Jul 2019 07:11 )   PT: 23.70 sec;   INR: 2.08 ratio         PTT - ( 06 Jul 2019 08:23 )  PTT:34.7 sec          RADIOLOGY & ADDITIONAL TESTS:
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 Reports symptoms started on Saturday morning and progressively worsened. Also reports significant pain as well as worsening edema, chills, subjective fevers, and nausea day PTP. Patient reports she hit her L leg on a chair and cut herself on Friday night, though denies any trauma to R side. No insects or tick bites. Denies any drainage or purulence to RLE. Denies any other complaints or symptoms. No rash anywhere else on body. Patient has a history of LE cellulitis that has required Burn debridement in the past. (01 Jul 2019 16:00)    MEDICATIONS  (STANDING):  amLODIPine   Tablet 5 milliGRAM(s) Oral daily  ascorbic acid 500 milliGRAM(s) Oral daily  chlorhexidine 4% Liquid 1 Application(s) Topical <User Schedule>  cyanocobalamin 1000 MICROGram(s) Oral daily  docusate sodium 100 milliGRAM(s) Oral three times a day  ferrous    sulfate 325 milliGRAM(s) Oral daily  furosemide   Injectable 40 milliGRAM(s) IV Push daily  gabapentin 300 milliGRAM(s) Oral three times a day  metoprolol tartrate 50 milliGRAM(s) Oral two times a day  montelukast 10 milliGRAM(s) Oral at bedtime  pantoprazole    Tablet 40 milliGRAM(s) Oral before breakfast  predniSONE   Tablet 10 milliGRAM(s) Oral daily  senna 2 Tablet(s) Oral at bedtime  silver sulfADIAZINE 1% Cream 1 Application(s) Topical two times a day  sodium chloride 0.9% lock flush 3 milliLiter(s) IV Push every 8 hours  tacrolimus 0.5 milliGRAM(s) Oral every 12 hours  vancomycin  IVPB 500 milliGRAM(s) IV Intermittent every 12 hours    MEDICATIONS  (PRN):  acetaminophen   Tablet .. 650 milliGRAM(s) Oral every 6 hours PRN Temp greater or equal to 38.5C (101.3F)  ondansetron    Tablet 4 milliGRAM(s) Oral every 8 hours PRN Nausea and/or Vomiting  oxyCODONE    5 mG/acetaminophen 325 mG 2 Tablet(s) Oral every 4 hours PRN Severe Pain (7 - 10)  oxyCODONE    5 mG/acetaminophen 325 mG 1 Tablet(s) Oral every 4 hours PRN Moderate Pain (4 - 6)  polyethylene glycol 3350 17 Gram(s) Oral at bedtime PRN Constipation    INTERVAL EVENTS: Patient seen today family at bedside, eating food from outside. No new complaints      Wt(kg): --Vital Signs Last 24 Hrs  T(C): 36.6 (11 Jul 2019 07:54), Max: 36.9 (10 Jul 2019 15:40)  T(F): 97.8 (11 Jul 2019 07:54), Max: 98.4 (10 Jul 2019 15:40)  HR: 78 (11 Jul 2019 07:54) (78 - 82)  BP: 114/65 (11 Jul 2019 07:54) (114/65 - 126/66)  BP(mean): --  RR: 18 (11 Jul 2019 07:54) (18 - 18)  SpO2: 99% (11 Jul 2019 00:10) (96% - 99%)    PHYSICAL EXAM:  GENERAL: NAD  NECK: Supple, No JVD  CHEST/LUNG: Clear; No wheezing  HEART: S1, S2, Regular rate and rhythm  ABDOMEN: Soft, Nontender, Nondistended; Bowel sounds present  EXTREMITIES: + edema  SKIN: Dressing intact to RLE, thin skin, multiple superficial bruises    LABS:  Labs:                        12.4   11.96 )-----------( 356      ( 10 Jul 2019 16:32 )             39.8             07-10    138  |  98  |  20  ----------------------------<  187<H>  4.5   |  26  |  1.4    Ca    8.7      10 Jul 2019 16:32  Phos  4.2     07-10  Mg     2.1     07-10    TPro  5.4<L>  /  Alb  2.7<L>  /  TBili  0.4  /  DBili  <0.2  /  AST  16  /  ALT  8   /  AlkPhos  79  07-09    LIVER FUNCTIONS - ( 09 Jul 2019 16:54 )  Alb: 2.7 g/dL / Pro: 5.4 g/dL / ALK PHOS: 79 U/L / ALT: 8 U/L / AST: 16 U/L / GGT: x                   PT/INR - ( 10 Jul 2019 16:32 )   PT: 28.00 sec;   INR: 2.46 ratio      PTT - ( 10 Jul 2019 16:32 )  PTT:33.7 sec      Culture - Acid Fast - Other w/Smear (collected 09 Jul 2019 13:54)  Source: .Other None    Culture - Fungal, Other (collected 09 Jul 2019 13:54)  Source: .Other None  Preliminary Report (10 Jul 2019 08:38):    Testing in progress    Culture - Surgical Swab (collected 09 Jul 2019 13:54)  Source: .Surgical Swab None  Preliminary Report (10 Jul 2019 21:33):    No growth    Culture - Fungal, Other (collected 09 Jul 2019 13:45)  Source: .Other None  Preliminary Report (10 Jul 2019 08:38):    Testing in progress    Culture - Acid Fast - Other w/Smear (collected 09 Jul 2019 13:45)  Source: .Other None    Culture - Surgical Swab (collected 09 Jul 2019 13:45)  Source: .Surgical Swab None  Preliminary Report (10 Jul 2019 21:26):    No growth      RADIOLOGY & ADDITIONAL TESTS:
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 Reports symptoms started on Saturday morning and progressively worsened. Also reports significant pain as well as worsening edema, chills, subjective fevers, and nausea day PTP. Patient reports she hit her L leg on a chair and cut herself on Friday night, though denies any trauma to R side. No insects or tick bites. Denies any drainage or purulence to RLE. Denies any other complaints or symptoms. No rash anywhere else on body. Patient has a history of LE cellulitis that has required Burn debridement in the past. (01 Jul 2019 16:00)    MEDICATIONS  (STANDING):  amLODIPine   Tablet 5 milliGRAM(s) Oral daily  ascorbic acid 500 milliGRAM(s) Oral daily  chlorhexidine 4% Liquid 1 Application(s) Topical <User Schedule>  cyanocobalamin 1000 MICROGram(s) Oral daily  ferrous    sulfate 325 milliGRAM(s) Oral daily  furosemide   Injectable 40 milliGRAM(s) IV Push daily  gabapentin 300 milliGRAM(s) Oral three times a day  metoprolol tartrate 50 milliGRAM(s) Oral two times a day  montelukast 10 milliGRAM(s) Oral at bedtime  pantoprazole    Tablet 40 milliGRAM(s) Oral before breakfast  predniSONE   Tablet 10 milliGRAM(s) Oral daily  sodium chloride 0.9% lock flush 3 milliLiter(s) IV Push every 8 hours  tacrolimus 0.5 milliGRAM(s) Oral every 12 hours  vancomycin  IVPB 750 milliGRAM(s) IV Intermittent every 12 hours  warfarin 3 milliGRAM(s) Oral once    MEDICATIONS  (PRN):  acetaminophen   Tablet .. 650 milliGRAM(s) Oral every 6 hours PRN Temp greater or equal to 38.5C (101.3F)  acetaminophen   Tablet .. 650 milliGRAM(s) Oral every 4 hours PRN Mild Pain (1 - 3)  morphine  - Injectable 2 milliGRAM(s) IV Push every 3 hours PRN Severe Pain (7 - 10)  ondansetron    Tablet 4 milliGRAM(s) Oral every 8 hours PRN Nausea and/or Vomiting    INTERVAL EVENTS: Patient seen earlier today post debridement.    T(C): 36.2 (07-09-19 @ 16:16), Max: 36.3 (07-09-19 @ 08:48)  HR: 78 (07-09-19 @ 16:16) (62 - 78)  BP: 142/76 (07-09-19 @ 16:16) (138/74 - 161/78)  RR: 18 (07-09-19 @ 16:16) (18 - 20)  SpO2: 97% (07-09-19 @ 16:16) (97% - 100%)  Wt(kg): --Vital Signs Last 24 Hrs  T(C): 36.2 (09 Jul 2019 16:16), Max: 36.3 (09 Jul 2019 08:48)  T(F): 97.1 (09 Jul 2019 16:16), Max: 97.4 (09 Jul 2019 08:48)  HR: 78 (09 Jul 2019 16:16) (62 - 78)  BP: 142/76 (09 Jul 2019 16:16) (138/74 - 161/78)  BP(mean): --  RR: 18 (09 Jul 2019 16:16) (18 - 20)  SpO2: 97% (09 Jul 2019 16:16) (97% - 100%)    PHYSICAL EXAM:  GENERAL: NAD  NECK: Supple, No JVD  CHEST/LUNG: Clear  HEART: S1, S2,   ABDOMEN: Soft, Nontender, Bowel sounds present  EXTREMITIES: No clubbing, or edema    LABS:  Labs:                        13.0   11.52 )-----------( 360      ( 09 Jul 2019 16:54 )             40.5             07-09    140  |  101  |  19  ----------------------------<  145<H>  4.6   |  26  |  1.1    Ca    8.5      09 Jul 2019 16:54  Phos  3.8     07-09  Mg     1.7     07-09    TPro  5.4<L>  /  Alb  2.7<L>  /  TBili  0.4  /  DBili  <0.2  /  AST  16  /  ALT  8   /  AlkPhos  79  07-09    LIVER FUNCTIONS - ( 09 Jul 2019 16:54 )  Alb: 2.7 g/dL / Pro: 5.4 g/dL / ALK PHOS: 79 U/L / ALT: 8 U/L / AST: 16 U/L / GGT: x                   PT/INR - ( 09 Jul 2019 16:54 )   PT: 23.10 sec;   INR: 2.02 ratio      PTT - ( 09 Jul 2019 16:54 )  PTT:31.2 sec      RADIOLOGY & ADDITIONAL TESTS:
Patient is a 74y old  Female who presents with a chief complaint of cellulitis (04 Jul 2019 14:17)    No acute events overnight    Vital Signs Last 24 Hrs  T(C): 35.9 (04 Jul 2019 13:29), Max: 36.7 (03 Jul 2019 16:34)  T(F): 96.6 (04 Jul 2019 13:29), Max: 97.5 (03 Jul 2019 17:57)  HR: 76 (04 Jul 2019 13:29) (76 - 96)  BP: 135/65 (04 Jul 2019 13:29) (130/72 - 145/80)  BP(mean): --  RR: 18 (04 Jul 2019 13:29) (14 - 20)  SpO2: 97% (04 Jul 2019 07:50) (96% - 99%)  I&O's Summary    03 Jul 2019 07:01  -  04 Jul 2019 07:00  --------------------------------------------------------  IN: 100 mL / OUT: 600 mL / NET: -500 mL    04 Jul 2019 07:01  -  04 Jul 2019 15:35  --------------------------------------------------------  IN: 500 mL / OUT: 0 mL / NET: 500 mL        Meds:  MEDICATIONS  (STANDING):  amLODIPine   Tablet 5 milliGRAM(s) Oral daily  ascorbic acid 500 milliGRAM(s) Oral daily  cefepime   IVPB 1000 milliGRAM(s) IV Intermittent every 12 hours  chlorhexidine 4% Liquid 1 Application(s) Topical <User Schedule>  cyanocobalamin 1000 MICROGram(s) Oral daily  ferrous    sulfate 325 milliGRAM(s) Oral daily  furosemide   Injectable 40 milliGRAM(s) IV Push daily  gabapentin 300 milliGRAM(s) Oral three times a day  lactated ringers. 1000 milliLiter(s) (100 mL/Hr) IV Continuous <Continuous>  metoprolol tartrate 50 milliGRAM(s) Oral two times a day  metroNIDAZOLE  IVPB 500 milliGRAM(s) IV Intermittent every 8 hours  montelukast 10 milliGRAM(s) Oral at bedtime  ondansetron Injectable 4 milliGRAM(s) IV Push once  predniSONE   Tablet 10 milliGRAM(s) Oral daily  tacrolimus 0.5 milliGRAM(s) Oral every 12 hours  vancomycin  IVPB 750 milliGRAM(s) IV Intermittent every 12 hours    MEDICATIONS  (PRN):  acetaminophen   Tablet .. 650 milliGRAM(s) Oral every 4 hours PRN Mild Pain (1 - 3)  acetaminophen   Tablet .. 650 milliGRAM(s) Oral every 6 hours PRN Temp greater or equal to 38.5C (101.3F)  morphine  - Injectable 2 milliGRAM(s) IV Push every 3 hours PRN Severe Pain (7 - 10)  ondansetron    Tablet 4 milliGRAM(s) Oral every 8 hours PRN Nausea and/or Vomiting        Culture - Tissue with Gram Stain (collected 03 Jul 2019 19:57)  Source: .Tissue None  Gram Stain (04 Jul 2019 09:23):    No polymorphonuclear leukocytes per low power field    No organisms seen per oil power field        Labs:                        12.7   13.08 )-----------( 235      ( 03 Jul 2019 22:50 )             38.8     07-03    141  |  98  |  17  ----------------------------<  112<H>  3.4<L>   |  28  |  1.0    Ca    8.3<L>      03 Jul 2019 22:50  Phos  3.4     07-03  Mg     1.6     07-03    TPro  5.1<L>  /  Alb  2.6<L>  /  TBili  0.5  /  DBili  x   /  AST  11  /  ALT  9   /  AlkPhos  94  07-03        PE: AAO x 3    full thickness wounds to right leg with some necrotic tissue, no active bleeding
Patient is a 74y old  Female who presents with a chief complaint of cellulitis (07 Jul 2019 09:50)    f/u for rt leg wounds s/p jamey    Vital Signs Last 24 Hrs  T(C): 36.3 (07 Jul 2019 13:44), Max: 36.3 (07 Jul 2019 13:44)  T(F): 97.3 (07 Jul 2019 13:44), Max: 97.3 (07 Jul 2019 13:44)  HR: 75 (07 Jul 2019 13:44) (73 - 76)  BP: 136/68 (07 Jul 2019 13:44) (136/68 - 169/77)  BP(mean): --  RR: 16 (07 Jul 2019 13:44) (16 - 18)  SpO2: 97% (07 Jul 2019 05:12) (97% - 97%)      Meds:  MEDICATIONS  (STANDING):  amLODIPine   Tablet 5 milliGRAM(s) Oral daily  ascorbic acid 500 milliGRAM(s) Oral daily  cefepime   IVPB 1000 milliGRAM(s) IV Intermittent every 12 hours  chlorhexidine 4% Liquid 1 Application(s) Topical <User Schedule>  cyanocobalamin 1000 MICROGram(s) Oral daily  ferrous    sulfate 325 milliGRAM(s) Oral daily  furosemide   Injectable 40 milliGRAM(s) IV Push daily  gabapentin 300 milliGRAM(s) Oral three times a day  metoprolol tartrate 50 milliGRAM(s) Oral two times a day  metroNIDAZOLE  IVPB 500 milliGRAM(s) IV Intermittent every 8 hours  montelukast 10 milliGRAM(s) Oral at bedtime  pantoprazole    Tablet 40 milliGRAM(s) Oral before breakfast  potassium chloride    Tablet ER 40 milliEquivalent(s) Oral daily  predniSONE   Tablet 10 milliGRAM(s) Oral daily  sodium chloride 0.9% lock flush 3 milliLiter(s) IV Push every 8 hours  tacrolimus 0.5 milliGRAM(s) Oral every 12 hours  vancomycin  IVPB 750 milliGRAM(s) IV Intermittent every 12 hours  warfarin 2 milliGRAM(s) Oral once    MEDICATIONS  (PRN):  acetaminophen   Tablet .. 650 milliGRAM(s) Oral every 4 hours PRN Mild Pain (1 - 3)  acetaminophen   Tablet .. 650 milliGRAM(s) Oral every 6 hours PRN Temp greater or equal to 38.5C (101.3F)  morphine  - Injectable 2 milliGRAM(s) IV Push every 3 hours PRN Severe Pain (7 - 10)  ondansetron    Tablet 4 milliGRAM(s) Oral every 8 hours PRN Nausea and/or Vomiting    Labs:                        12.2   10.19 )-----------( 317      ( 07 Jul 2019 07:11 )             39.1     07-07    140  |  98  |  15  ----------------------------<  173<H>  3.4<L>   |  29  |  1.1    Ca    8.4<L>      07 Jul 2019 07:11          PE: AAO x 3    full thickness wounds to rt leg with granulation tissue, erythema +, tenderness+  small serosang dc
SUBJECTIVE:    Patient is a 74y old Female who presents with a chief complaint of cellulitis (08 Jul 2019 17:17)    Currently admitted to medicine with the primary diagnosis of Sepsis     Today is hospital day 8d. This morning she is resting comfortably in bed and reports no new issues or overnight events.     PAST MEDICAL & SURGICAL HISTORY  Autoimmune hepatitis  Other chronic pulmonary embolism without acute cor pulmonale  Essential hypertension  Autoimmune hepatitis treated with steroids  Asthma  DVT (deep venous thrombosis)  S/P debridement  History of back surgery    SOCIAL HISTORY:  Negative for smoking/alcohol/drug use.     ALLERGIES:  No Known Allergies    MEDICATIONS:  STANDING MEDICATIONS    PRN MEDICATIONS    VITALS:   T(F): 96.1  HR: 70  BP: 149/74  RR: 18  SpO2: 99%    PHYSICAL EXAM:  GEN: No acute distress  LUNGS: Clear to auscultation bilaterally   HEART: S1/S2 present.   ABD: Soft, non-tender, non-distended. Bowel sounds present  EXT: RLE swelling  NEURO: AAOX3      LABS:                        12.8   9.76  )-----------( 300      ( 08 Jul 2019 07:03 )             40.1     07-08    142  |  101  |  18  ----------------------------<  115<H>  3.7   |  28  |  1.2    Ca    8.5      08 Jul 2019 07:03      PT/INR - ( 08 Jul 2019 07:03 )   PT: 20.70 sec;   INR: 1.81 ratio                           RADIOLOGY:
stable    Vital Signs Last 24 Hrs  T(C): 36.1 (14 Jul 2019 07:34), Max: 36.2 (13 Jul 2019 23:40)  T(F): 96.9 (14 Jul 2019 07:34), Max: 97.2 (13 Jul 2019 23:40)  HR: 61 (14 Jul 2019 07:34) (61 - 76)  BP: 139/74 (14 Jul 2019 07:34) (139/74 - 156/76)  BP(mean): --  RR: 18 (14 Jul 2019 07:34) (18 - 18)  SpO2: 97% (14 Jul 2019 07:34) (96% - 97%)    CVP:  T(C): 36.1 (07-14-19 @ 07:34), Max: 36.2 (07-13-19 @ 23:40)  HR: 61 (07-14-19 @ 07:34) (61 - 76)  BP: 139/74 (07-14-19 @ 07:34) (139/74 - 156/76)  RR: 18 (07-14-19 @ 07:34) (18 - 18)  SpO2: 97% (07-14-19 @ 07:34) (96% - 97%)  CVP(mm Hg): --    U.O.:  I&O's Detail    13 Jul 2019 07:01  -  14 Jul 2019 07:00  --------------------------------------------------------  IN:    lactated ringers.: 1300 mL  Total IN: 1300 mL    OUT:  Total OUT: 0 mL    Total NET: 1300 mL                                        11.2   12.50 )-----------( 300      ( 13 Jul 2019 16:57 )             35.1     07-13    141  |  101  |  20  ----------------------------<  144<H>  3.7   |  28  |  1.2    Ca    8.0<L>      13 Jul 2019 16:57  Phos  3.5     07-13  Mg     1.8     07-13    TPro  6.0  /  Alb  3.1<L>  /  TBili  0.5  /  DBili  x   /  AST  12  /  ALT  10  /  AlkPhos  96  07-13      Large Dressing Change--> right lowe leg with open area, decreased erythema
stable, d/c note    Vital Signs Last 24 Hrs  T(C): 36.9 (10 Jul 2019 15:40), Max: 37 (10 Jul 2019 07:57)  T(F): 98.4 (10 Jul 2019 15:40), Max: 98.6 (10 Jul 2019 07:57)  HR: 82 (10 Jul 2019 15:40) (80 - 82)  BP: 126/66 (10 Jul 2019 15:40) (117/67 - 148/74)  BP(mean): --  RR: 18 (10 Jul 2019 15:40) (18 - 18)  SpO2: 96% (10 Jul 2019 15:40) (96% - 96%)    CVP:  T(C): 36.9 (07-10-19 @ 15:40), Max: 37 (07-10-19 @ 07:57)  HR: 82 (07-10-19 @ 15:40) (80 - 82)  BP: 126/66 (07-10-19 @ 15:40) (117/67 - 148/74)  RR: 18 (07-10-19 @ 15:40) (18 - 18)  SpO2: 96% (07-10-19 @ 15:40) (96% - 96%)  CVP(mm Hg): --    U.O.:  I&O's Detail    09 Jul 2019 07:01  -  10 Jul 2019 07:00  --------------------------------------------------------  IN:    lactated ringers.: 80 mL    Oral Fluid: 200 mL  Total IN: 280 mL    OUT:  Total OUT: 0 mL    Total NET: 280 mL      10 Jul 2019 07:01  -  10 Jul 2019 18:04  --------------------------------------------------------  IN:    IV PiggyBack: 50 mL  Total IN: 50 mL    OUT:    Voided: 850 mL  Total OUT: 850 mL    Total NET: -800 mL                                        12.4   11.96 )-----------( 356      ( 10 Jul 2019 16:32 )             39.8     07-10    138  |  98  |  20  ----------------------------<  187<H>  4.5   |  26  |  1.4    Ca    8.7      10 Jul 2019 16:32  Phos  4.2     07-10  Mg     2.1     07-10    TPro  5.4<L>  /  Alb  2.7<L>  /  TBili  0.4  /  DBili  <0.2  /  AST  16  /  ALT  8   /  AlkPhos  79  07-09      Large Dressing Change--> bilateral lower legs--> healing
tenderness and pain right lower leg    PE: right lower leg--> open wounds with viable tissue and no purulent drainage post debridement    area tenderness and erythema distally
POD#1--> stable    Vital Signs Last 24 Hrs  T(C): 36.1 (13 Jul 2019 07:37), Max: 36.5 (12 Jul 2019 18:44)  T(F): 96.9 (13 Jul 2019 07:37), Max: 97.7 (12 Jul 2019 18:44)  HR: 68 (13 Jul 2019 09:00) (62 - 83)  BP: 150/72 (13 Jul 2019 09:00) (129/60 - 175/77)  BP(mean): 96 (12 Jul 2019 17:06) (96 - 96)  RR: 18 (13 Jul 2019 09:00) (12 - 23)  SpO2: 96% (13 Jul 2019 07:37) (95% - 99%)    CVP:  T(C): 36.1 (07-13-19 @ 07:37), Max: 36.5 (07-12-19 @ 18:44)  HR: 68 (07-13-19 @ 09:00) (62 - 83)  BP: 150/72 (07-13-19 @ 09:00) (129/60 - 175/77)  RR: 18 (07-13-19 @ 09:00) (12 - 23)  SpO2: 96% (07-13-19 @ 07:37) (95% - 99%)  CVP(mm Hg): --    U.O.:  I&O's Detail    12 Jul 2019 07:01  -  13 Jul 2019 07:00  --------------------------------------------------------  IN:    lactated ringers.: 1200 mL  Total IN: 1200 mL    OUT:    Voided: 400 mL  Total OUT: 400 mL    Total NET: 800 mL                                        12.3   13.60 )-----------( 345      ( 13 Jul 2019 09:11 )             39.1     07-13    141  |  99  |  17  ----------------------------<  149<H>  4.6   |  29  |  1.1    Ca    8.7      13 Jul 2019 09:11  Phos  3.4     07-13  Mg     1.8     07-13    TPro  6.0  /  Alb  3.1<L>  /  TBili  0.5  /  DBili  x   /  AST  12  /  ALT  10  /  AlkPhos  96  07-13      Large Dressing Change--> right lower leg granulating with decreased edema and erythema, mod tenderness prox wound
PM rounds     Pt: no complaints. reports less pain with dressing change  No acute events o/n  Vital Signs Last 24 Hrs  T(C): 36.2 (15 Jul 2019 16:13), Max: 36.7 (15 Jul 2019 07:42)  T(F): 97.2 (15 Jul 2019 16:13), Max: 98 (15 Jul 2019 07:42)  HR: 65 (15 Jul 2019 16:13) (65 - 70)  BP: 152/70 (15 Jul 2019 16:13) (127/70 - 152/70)  BP(mean): --  RR: 18 (15 Jul 2019 16:13) (18 - 18)  SpO2: 96% (15 Jul 2019 16:13) (96% - 96%)        I&O's Summary      07-15    141  |  99  |  19  ----------------------------<  151<H>  4.8   |  30  |  1.1    Ca    8.1<L>      15 Jul 2019 16:48  Phos  4.3     07-15  Mg     2.2     07-15                         11.8   10.11 )-----------( 312      ( 15 Jul 2019 16:48 )             38.4       EXAM:   Right leg - large open wound with some discolored fat necrosis   no bleeding

## 2019-07-16 NOTE — PROGRESS NOTE ADULT - PROVIDER SPECIALTY LIST ADULT
Burn
Infectious Disease
Internal Medicine
Burn
Burn

## 2019-07-16 NOTE — PROGRESS NOTE ADULT - ASSESSMENT
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 Denies any trauma, insect/mosquito/flies bite. CT right leg showed cellulitis.   ON predniSONE   Tablet 10    #RLE Purulent cellulitis/abscess     s/p OR 7/3, WCX MRSA     Severe sepsis on admission with lactic acidosis P>90 T >101 WBC >12    BCX NG  wbc 10.11    CT showed < from: CT Lower Extremity No Cont, Right (07.01.19 @ 13:09) >1. Leg cellulitis without evidence of necrotizing fasciitis2. Small knee effusion with mild tricompartmental degenerative change 3. Midfoot and hindfoot degenerative change with vascular calcifications consistent with neuropathic osteoarthropathy  On vancomycin  IVPB 500 milliGRAM(s) IV Intermittent every 12 hours  7/13 trough 10.3    #Obesity BMI 33    RECOMMENDATIONS  For D/C on PO bactrim 1 DS tab PO BID to complete total 10 days

## 2019-07-16 NOTE — PROGRESS NOTE ADULT - REASON FOR ADMISSION
cellulitis
Cellulitis of RLE
cellulitis
cellulitis w sepsis
cellulitis

## 2019-07-25 NOTE — PHYSICAL EXAM
[Healing] : healing [Size%: ______] : Size: [unfilled]% [Infected?] : Infected: No [6] : 6 out of 10 [Abnormal] : abnormal [Large] : medium [] : no [de-identified] : percocet [de-identified] : right lower leg with decreased edema and erythema, tenderness and mild erythema calf area --. granulating wih areas fat necrosis--> no niki purulence--> culture sent--. > \par \par bactrim given
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
## 2019-08-06 NOTE — PHYSICAL EXAM
[Healing] : healing [Size%: ______] : Size: [unfilled]% [Infected?] : Infected: No [6] : 6 out of 10 [Abnormal] : abnormal [Large] : medium [] : yes [de-identified] : percocet [de-identified] : right lower leg wound healing--> ssd

## 2019-08-06 NOTE — HISTORY OF PRESENT ILLNESS
[Did you have an operation on your burn/wound injury?] : Did you have an operation on your burn/wound injury? Yes [Did this injury occur on the job?] : Did this injury occur on the job? No [de-identified] : right lower leg wound / abscess post drainage [de-identified] : right lower leg wound healing slowly

## 2019-08-12 NOTE — ED PROVIDER NOTE - CLINICAL SUMMARY MEDICAL DECISION MAKING FREE TEXT BOX
pt with multiple comorbidities, here with would with surrounding cellultis. Pt requires admission for IV ABX and wound care.

## 2019-08-12 NOTE — ED PROVIDER NOTE - PHYSICAL EXAMINATION
CONSTITUTIONAL: WA / WN / NAD  HEAD: NCAT  EYES: PERRL; EOMI;   ENT: Normal pharynx; mucous membranes pink/moist, no erythema.  NECK: Supple; no meningeal signs  CARD: RRR; nl S1/S2; no M/R/G. Pulses equal bilaterally.  RESP: Respiratory rate and effort are normal; breath sounds clear and equal bilaterally.  ABD: Soft, NT ND nl bowel sounds; no masses; no rebound  MSK/EXT: right lower extremity chronic wounds [id number] left lower extremity laceration 2.5cm with erythema swelling and pain.  SKIN: + left lower leg erythema and lacration 2.5cm  NEURO: AAOx3  PSYCH: Memory Intact, Normal Affect

## 2019-08-12 NOTE — ED PROVIDER NOTE - NS ED ROS FT
Constitutional: See HPI.  Eyes: No visual changes,  ENMT:  No neck pain   Cardiac: No cp  Respiratory: No cough   GI: No nausea, vomiting, diarrhea or abdominal pain.  : No dysuria, frequency or burning.   MS: see hpi  Psych: No suicidal or homicidal ideations.  Neuro: No headache or weakness. No LOC.  Skin: see hpi

## 2019-08-12 NOTE — H&P ADULT - NSHPPHYSICALEXAM_GEN_ALL_CORE
PHYSICAL EXAM:  GENERAL: NAD, well-developed  HEAD:  Atraumatic, Normocephalic  EYES: EOMI, PERRLA, conjunctiva and sclera clear  NECK: Supple, No JVD  CHEST/LUNG: Clear to auscultation bilaterally; No wheeze, ronchi or rales  HEART: Regular rate and rhythm; No murmurs, rubs, or gallops  ABDOMEN: Soft, Nontender, Nondistended; Bowel sounds present  EXTREMITIES:  left lower ext tenderness to palpation, erythema, clear drainage from wound site. right lower ext with chronic ulcer.  PSYCH: AAOx3  NEUROLOGY: non-focal  SKIN: No rashes or lesions

## 2019-08-12 NOTE — H&P ADULT - ATTENDING COMMENTS
Patient seen this morning with open tender wound to the LLE. Patient had recent admission of severe infection to the RLE. Patient with auto immune hepatitis, immuno compromised. ID and Burn consult ordered. Continue Contact isolation for prior MRSA history. Further plan pending the above results.    patient non toxic appearing  LLE is tender to touch   wound with apparent eschar    Cellulitis of LLE  - continue IV Vancomycin/Cefepime  - ID and Burn Evaluation

## 2019-08-12 NOTE — H&P ADULT - NSHPREVIEWOFSYSTEMS_GEN_ALL_CORE
REVIEW OF SYSTEMS:    CONSTITUTIONAL: No weakness, fevers or chills  RESPIRATORY: No cough, wheezing, hemoptysis; No shortness of breath  CARDIOVASCULAR: No chest pain or palpitations  GASTROINTESTINAL: No abdominal or epigastric pain. No nausea, vomiting, or hematemesis; No diarrhea or constipation. No melena or hematochezia.  GENITOURINARY: No dysuria, frequency or hematuria  NEUROLOGICAL: No numbness or weakness  SKIN: No itching, rashes

## 2019-08-12 NOTE — H&P ADULT - HISTORY OF PRESENT ILLNESS
75 yo F with PMH of autoimmune hepatitis, HTN, DVT/PE secondary to Prothrombin gene mutation on Coumadin, asthma presented to ED with left LE wound. History started when 5 days ago patient cut her left lower extremity on the lateral aspect on a staple that was protruding from a mirror. Subsequently on Saturday patient started to notice a red discoloration, swelling and increasing leg pain. Reports pain as a soreness, 5-7/10, made worse with ambulation, located from the shin to the foot. She does endorse drainage from the left lower ext wound. Denies fever, nausea, vomiting, abdominal pain,  No insects or tick bites.    Of note patient previously admitted for the right lower ext wound for which she had debridement and drainage - sees Dr. Kang. Prior cultures have shown presence of MRSA.

## 2019-08-12 NOTE — ED PROVIDER NOTE - ATTENDING CONTRIBUTION TO CARE
73 yo F presented for evaluaiton of leg wound with surrounding erythrma. Wound sustainted. 75 yo F with medical hx including autoimmune hepatitis, DVT.PE, asthma presented for evaluation of leg wound with surrounding erythema. Wound sustained in the area prior to sx. Pt has other areas of healing wounds under the care of Dr. Kang.   On exam there is a would on the L leg healing  by secondary intention with surround cellulitis, no crepitus or purulence, pt has healing wounds on the R as well. Labs ABX and given hx admission

## 2019-08-12 NOTE — H&P ADULT - NSHPLABSRESULTS_GEN_ALL_CORE
12.2   13.74 )-----------( 231      ( 12 Aug 2019 12:40 )             38.8       08-12    142  |  106  |  19  ----------------------------<  141<H>  3.9   |  23  |  1.1    Ca    8.7      12 Aug 2019 12:40    TPro  6.0  /  Alb  3.5  /  TBili  1.0  /  DBili  x   /  AST  18  /  ALT  27  /  AlkPhos  108  08-12                  PT/INR - ( 12 Aug 2019 12:40 )   PT: 34.20 sec;   INR: 3.01 ratio         PTT - ( 12 Aug 2019 12:40 )  PTT:38.8 sec    Lactate Trend  08-12 @ 12:40 Lactate:1.1             CAPILLARY BLOOD GLUCOSE      POCT Blood Glucose.: 127 mg/dL (12 Aug 2019 11:46)    < from: Xray Tibia + Fibula 2 Views, Left (08.12.19 @ 15:59) >      Impression:  No acute osseous abnormality.    < end of copied text >

## 2019-08-12 NOTE — PATIENT PROFILE ADULT - NSPROMEDSBROUGHTTOHOSP_GEN_A_NUR
It was a pleasure seeing you for your physical examination.  I wanted to get back to you with your test results.  I have enclosed a copy for your review.      I am happy to report that your cbc or complete blood count is normal with no signs of anemia, leukemia or platelet abnormalities.  Your chemistry panel shows a slightly elevated blood glucose but a normal hemoglobin A1c.  The latter test looks back over the last 3 months to give us an average reading so it is better than the glucose test itself.  The bottom line is that you are at higher risk of developing diabetes.  The best way to lower the risk is through regular exercise, a healthy diet and keeping your weight down.  Your blood salts, kidney tests, liver tests, and proteins are all fine.    Your total cholesterol is 189 with the normal range being below 200.  Your HDL or good cholesterol is 62 with the normal range being above 40.  Your LDL or bad cholesterol is 89 with the normal range being below 130.  These numbers are very good.      A couple of additional normal results include the PSA test and your HIV test.    I am happy to bring you this overall excellent report.  If you have any questions let me know.   no

## 2019-08-12 NOTE — ED PROVIDER NOTE - OBJECTIVE STATEMENT
74 year old female w/ a pmh of autoimmune hepatitis, dvt/pe on coumadin, chronic right lower leg cellulitis follows by dr connelly presents here for left lower leg cellulitis. Patient states 5 days ago she sustained a cut to her left leg (tetanus unsure if up to date), and two days later began having pain and redness of the left lower extremity Patient was seen in urgent care and sent here for admission. Patient denies fever chills cough cp sob n/v abdominal pain urinary freuqency urgency or burning.

## 2019-08-12 NOTE — H&P ADULT - ASSESSMENT
# Left lower ext wound with surrounding cellulitis  - Elevated WBC 13.74, vitals WNL, no sepsis  - Left lower ext with erythema, swelling, tenderness  - Start cefepime and continue with vancomycin - patient previously documented with MRSA  - Duplex lower ext to rule out DVT  - Consult ID and Burn    # Right lower ext chronic ulcer  - Chronic since last admission  - Patient follows with Dr. Kang - f/u with burn consult    #) Autoimmune hepatitis - c/w Prednisone + Tacrolimus, c/w Budesonide - non-formulary, instructed patient to bring own med     #) Prothrombin mutation - c/w Coumadin 2mg daily, daily INR - will hold INR for today as greater than 3, f/u in AM    #) HTN - stable, c/w Norvasc +BB    #) Asthma - stable, c/w Singulair    DVT ppx: on Coumadin  Diet: DASH  Activity: AAT  Code status: FULL  Dispo: Home

## 2019-08-13 NOTE — PROGRESS NOTE ADULT - SUBJECTIVE AND OBJECTIVE BOX
Hospital Day:  1d    Subjective:    Patient is a 74y old  Female who presents with a chief complaint of right lower ext wound (12 Aug 2019 17:10)      Past Medical Hx:   Autoimmune hepatitis  Other chronic pulmonary embolism without acute cor pulmonale  Essential hypertension  Autoimmune hepatitis treated with steroids  Asthma  DVT (deep venous thrombosis)    Past Sx:  S/P debridement  History of back surgery  No significant past surgical history    Allergies:  No Known Allergies    Current Meds:   Standng Meds:  amLODIPine   Tablet 5 milliGRAM(s) Oral daily  cefepime   IVPB 1000 milliGRAM(s) IV Intermittent every 12 hours  cefepime   IVPB      chlorhexidine 4% Liquid 1 Application(s) Topical <User Schedule>  docusate sodium 100 milliGRAM(s) Oral two times a day  furosemide    Tablet 20 milliGRAM(s) Oral daily  gabapentin 300 milliGRAM(s) Oral three times a day  lactulose Syrup 30 Gram(s) Oral once  metoprolol tartrate 50 milliGRAM(s) Oral daily  montelukast 10 milliGRAM(s) Oral at bedtime  predniSONE   Tablet 10 milliGRAM(s) Oral daily  senna 2 Tablet(s) Oral at bedtime  silver sulfADIAZINE 1% Cream 1 Application(s) Topical two times a day  tacrolimus 0.5 milliGRAM(s) Oral every 12 hours  vancomycin  IVPB 1000 milliGRAM(s) IV Intermittent every 12 hours    PRN Meds:  oxyCODONE    5 mG/acetaminophen 325 mG 1 Tablet(s) Oral every 6 hours PRN Moderate Pain (4 - 6)    HOME MEDICATIONS:  amLODIPine 5 mg oral tablet: 1 tab(s) orally once a day  budesonide 3 mg oral capsule, extended release: 1 cap(s) orally 2 times a day  ferrous sulfate 325 mg (65 mg elemental iron) oral delayed release tablet: 1 tab(s) orally once a day  furosemide 20 mg oral tablet: 1 tab(s) orally once a day  gabapentin 300 mg oral capsule: 1 cap(s) orally 3 times a day  Metoprolol Tartrate 50 mg oral tablet: 1 tab(s) orally once a day  montelukast 10 mg oral tablet: 1 tab(s) orally once a day (at bedtime)  predniSONE 10 mg oral tablet: 1 tab(s) orally once a day  risedronate 150 mg oral tablet: 1 tab(s) orally once a month  tacrolimus 0.5 mg oral capsule: 1 cap(s) orally every 12 hours  Vitamin B12 1000 mcg oral tablet: 1 tab(s) orally once a day  Vitamin C 1000 mg oral tablet: 1 tab(s) orally once a day  warfarin 2 mg oral tablet: 1 tab(s) orally once a day      Vital Signs:   T(F): 96.8 (08-13-19 @ 05:51), Max: 97.2 (08-12-19 @ 17:40)  HR: 86 (08-13-19 @ 05:51) (70 - 86)  BP: 167/79 (08-13-19 @ 05:51) (114/85 - 178/86)  RR: 18 (08-13-19 @ 05:51) (18 - 18)  SpO2: 98% (08-13-19 @ 01:48) (97% - 98%)        Physical Exam:   GENERAL: NAD, lying in bed, pleasant and conversive appearing stated age  HEENT: NCAT, PERRLA, EOMI   CHEST/LUNG: CTA, No wheezing, rhonchi, rales  HEART: Regular rate and rhythm; s1 s2 appreciated, No murmurs, rubs, or gallops  ABDOMEN: Soft, Nontender, Nondistended; Bowel sounds present  EXTREMITIES: LLE with noted area of erythema and warm to touch from anterior shin to knee. Small noted area with mild discharge on LLE, RLE with chronic ulcer wrapped in ace wrap. No cyanosis, no clubbing, peripheral pulses 2+  NERVOUS SYSTEM:  Alert & Oriented X3, Non focal     Labs:                         11.5   11.20 )-----------( 225      ( 13 Aug 2019 07:10 )             36.9     Neutophil% 65.4, Lymphocyte% 25.9, Monocyte% 6.3, Bands% 1.3 08-13-19 @ 07:10    13 Aug 2019 07:10    144    |  106    |  16     ----------------------------<  135    3.5     |  26     |  0.9      Ca    8.6        13 Aug 2019 07:10    TPro  6.0    /  Alb  3.5    /  TBili  1.0    /  DBili  x      /  AST  18     /  ALT  27     /  AlkPhos  108    12 Aug 2019 12:40       pTT    33.1             ----< 2.60 INR  (08-13-19 @ 07:10)    29.60        PT,    pTT    38.8             ----< 3.01 INR  (08-12-19 @ 12:40)    34.20        PT    Radiology:   None Today    Assessment and Plan:   This is a 74 year old female with PMHx of Autoimmune hepatitis, HTN, DVT/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 Kang following patient; burn team consulted for regimen     # Autoimmune hepatitis   - Continue with Prednisone, Tacrolimus  - Budesonide non-formulary to be completed when patient medication brought in    # Prothrombin gene mutation  - Coumadin 2mg ordered today as INR 2.6    # Hypertension  - Continue with Norvasc and 5, Lasix 20, and Metoprolol    # Asthma   - Continue with Singulair daily    Activity: As tolerated  Diet: DASH  DVT ppx: Coumadin  GI ppx: Not indicated  Code Status: Full Code  DISPO: From home; pending ID and burn evaluations

## 2019-08-13 NOTE — CONSULT NOTE ADULT - ASSESSMENT
73 y/o F PMH autoimmune hepatitis and chronic RLE cellulitis followed by Dr. Kang presents with pain and erythema of LLE after traumatic injury to soft tissue consistent with non-purulent cellulitis.     #Left Lower Extremity Cellulitis, Non-purulent  - No sepsis   - Stop Vancomycin and start clindamycin  mg q8  - Continue with Cefepime 1g q12  - F/u wound cultures   - Wound care and dressing change as per Burn 73 y/o F PMH autoimmune hepatitis and chronic RLE cellulitis followed by Dr. Kang presents with pain and erythema of LLE after traumatic injury to soft tissue consistent with non-purulent cellulitis.     #Left Lower Extremity Cellulitis, Non-purulent  - No sepsis   - Continue Vancomycin 1g q12  - Discontinue cefepime  - F/u wound cultures and sensitivities    - Wound care and dressing change as per Burn 73 y/o F PMH autoimmune hepatitis and chronic RLE cellulitis followed by Dr. Kang presents with pain and erythema of LLE after traumatic injury to soft tissue consistent with non-purulent cellulitis.     Impression:  Left Lower Extremity Cellulitis, Non-purulent  No sepsis     RECOMMENDATIONS:  Vancomycin 1g q12  D/c cefepime  F/u wound cultures and sensitivities    Wound care and dressing change as per Burn

## 2019-08-13 NOTE — CONSULT NOTE ADULT - SUBJECTIVE AND OBJECTIVE BOX
DONI HUDSON  74y, Female  Allergy: No Known Allergies      CHIEF COMPLAINT: right lower ext wound (13 Aug 2019 09:41)      HPI:    Mrs. Hudson is a 74 year-old female with a PMH of autoimmune hepatitis on oral tacrolimus and chronic right lower leg cellulitis followed by Dr. Kang. She presented to the Ed after an urgent care referral for admission for LLE cellulitis. Her story begins a week ago, when we scraped the left lateral aspect of her lower leg on a staple that was sticking out of a mirror at home. She cleaned the wound and went on with her day. 2 days after her injury, she noticed increasing redness, swelling, drainage, and pain 5-7/10. The pain is worse with ambulation. The swelling has improved since she has been recumbent in bed. In the ED she stated the drainage was enough to wet the sheets. She reports that she has never had LLE cellulitis in the past. Last month she had debridement of RLE cellulitis by Dr. Knag, and MRSA was cultured from her wound. She received vancomycin and cefepime in the ED and is receiving these two antibiotics 1g each every 12 hours. She denies fever, chills, n/v, abdominal pain, and insect bites.       FAMILY HISTORY:  No pertinent family history in first degree relatives    PAST MEDICAL & SURGICAL HISTORY:  Autoimmune hepatitis  Other chronic pulmonary embolism without acute cor pulmonale  Essential hypertension  Autoimmune hepatitis treated with steroids  Asthma  DVT (deep venous thrombosis)  S/P debridement  History of back surgery      SOCIAL HISTORY    Substance Use (x) never used  (  ) IVDU (  ) Other:  Tobacco Usage:  (   ) never smoked   (x) former smoker   (   ) current smoker   Alcohol Usage: (   ) social  (   ) daily use (x) denies  Sexual History: Monogamous with        ROS  General: Denies rigors, nightsweats  HEENT: Denies headache, rhinorrhea, sore throat, eye pain  CV: Denies CP, palpitations  PULM: Denies SOB, wheezing  GI: Denies abdominal pain, hematochezia/melena  : Denies dysuria, hematuria  MSK: Denies arthralgias, myalgias  SKIN: Admits to painful LLE and seeping drainage  NEURO: Denies paresthesias, weakness  PSYCH: Denies depression, anxiety    VITALS:  T(F): 96.8, Max: 97.2 (08-12-19 @ 17:40)  HR: 86  BP: 167/79  RR: 18Vital Signs Last 24 Hrs  T(C): 36 (13 Aug 2019 05:51), Max: 36.2 (12 Aug 2019 17:40)  T(F): 96.8 (13 Aug 2019 05:51), Max: 97.2 (12 Aug 2019 17:40)  HR: 86 (13 Aug 2019 05:51) (70 - 86)  BP: 167/79 (13 Aug 2019 05:51) (114/85 - 178/86)  BP(mean): --  RR: 18 (13 Aug 2019 05:51) (18 - 18)  SpO2: 98% (13 Aug 2019 01:48) (97% - 98%)    PHYSICAL EXAM:  Gen: NAD, resting in bed  HEENT: Normocephalic, atraumatic  Neck: supple, no lymphadenopathy  CV: Regular rate & regular rhythm  Lungs: decreased BS at bases  Abdomen: Soft, BS present  Ext: RLE wrapped with ACE bandage, LLE warm, tender to palaption  Neuro: non focal, awake  Skin: LLE tender to palpation, patchy erythema involving skin between left knee and left ankle. Moist traumatic wound on lateral aspect of left leg. No niki pus, no crepitus, no active bleeding, no abscess, no fluctuance.        TESTS & MEASUREMENTS:                        11.5   11.20 )-----------( 225      ( 13 Aug 2019 07:10 )             36.9     08-13    144  |  106  |  16  ----------------------------<  135<H>  3.5   |  26  |  0.9    Ca    8.6      13 Aug 2019 07:10    TPro  6.0  /  Alb  3.5  /  TBili  1.0  /  DBili  x   /  AST  18  /  ALT  27  /  AlkPhos  108  08-12    eGFR if Non African American: 63 mL/min/1.73M2 (08-13-19 @ 07:10)  eGFR if : 73 mL/min/1.73M2 (08-13-19 @ 07:10)  eGFR if Non African American: 49 mL/min/1.73M2 (08-12-19 @ 12:40)  eGFR if : 57 mL/min/1.73M2 (08-12-19 @ 12:40)    LIVER FUNCTIONS - ( 12 Aug 2019 12:40 )  Alb: 3.5 g/dL / Pro: 6.0 g/dL / ALK PHOS: 108 U/L / ALT: 27 U/L / AST: 18 U/L / GGT: x                 Lactate, Blood: 1.1 mmol/L (08-12-19 @ 12:40)      INFECTIOUS DISEASES TESTING: Wound cultures pending, MSSA/MRSA PCR pending      RADIOLOGY & ADDITIONAL TESTS:  I have personally reviewed the last Chest xray  CXR      CT      CARDIOLOGY TESTING  12 Lead ECG:   Ventricular Rate 64 BPM    Atrial Rate 64 BPM    P-R Interval 142 ms    QRS Duration 86 ms    Q-T Interval 404 ms    QTC Calculation(Bezet) 416 ms    P Axis 30 degrees    R Axis 26 degrees    T Axis 22 degrees    Diagnosis Line Normal sinus rhythm  Normal ECG    Confirmed by Mary Cole MD ([id number]) on 8/12/2019 1:45:34 PM (08-12-19 @ 12:56)      MEDICATIONS  amLODIPine   Tablet 5  cefepime   IVPB 1000  cefepime   IVPB   chlorhexidine 4% Liquid 1  docusate sodium 100  furosemide    Tablet 20  gabapentin 300  lactulose Syrup 30  metoprolol tartrate 50  montelukast 10  predniSONE   Tablet 10  senna 2  silver sulfADIAZINE 1% Cream 1  tacrolimus 0.5  vancomycin  IVPB 1000      ANTIBIOTICS:  cefepime   IVPB 1000 milliGRAM(s) IV Intermittent every 12 hours  cefepime   IVPB      vancomycin  IVPB 1000 milliGRAM(s) IV Intermittent every 12 hours        ASSESSMENT  74y F admitted with CELLULITIS    Autoimmune hepatitis  Other chronic pulmonary embolism without acute cor pulmonale  Essential hypertension  Autoimmune hepatitis treated with steroids  Asthma  DVT (deep venous thrombosis)      PROBLEMS  Pt with (Severe) Sepsis on admission (T<96.8F, T>101F, Pulse>90, Resp Rate>20, WBC>12, wbc<4, Bands>10%), lactic acidosis, metabolic encephalopathy, REJI due to suspected Gram negative pneumonia, aspiration pneumonia, pyelonephritis, bacteremia, cellulitis etc.     RECOMMENDATIONS  - f/u pending cultures  - ***

## 2019-08-14 NOTE — PROGRESS NOTE ADULT - SUBJECTIVE AND OBJECTIVE BOX
Hospital Day:  2d    Subjective:    Patient is a 74y old  Female who presents with a chief complaint of right lower ext wound (14 Aug 2019 08:37)    Overnight Burn advised they want the patient NPO for OR. Patient did receive coumadin but INR is 1.8; burn team aware and stated no need for FFP given under 2.0. Patient seen and examined at bedside. The patient reported no acute complaints.    Past Medical Hx:   Autoimmune hepatitis  Other chronic pulmonary embolism without acute cor pulmonale  Essential hypertension  Autoimmune hepatitis treated with steroids  Asthma  DVT (deep venous thrombosis)    Past Sx:  S/P debridement  History of back surgery  No significant past surgical history    Allergies:  No Known Allergies    Current Meds:   Standng Meds:  amLODIPine   Tablet 5 milliGRAM(s) Oral daily  chlorhexidine 4% Liquid 1 Application(s) Topical <User Schedule>  docusate sodium 100 milliGRAM(s) Oral two times a day  furosemide    Tablet 20 milliGRAM(s) Oral daily  gabapentin 300 milliGRAM(s) Oral three times a day  metoprolol tartrate 50 milliGRAM(s) Oral daily  montelukast 10 milliGRAM(s) Oral at bedtime  predniSONE   Tablet 10 milliGRAM(s) Oral daily  senna 2 Tablet(s) Oral at bedtime  silver sulfADIAZINE 1% Cream 1 Application(s) Topical two times a day  tacrolimus 0.5 milliGRAM(s) Oral every 12 hours  vancomycin  IVPB 1000 milliGRAM(s) IV Intermittent every 12 hours    PRN Meds:  oxyCODONE    5 mG/acetaminophen 325 mG 1 Tablet(s) Oral every 6 hours PRN Moderate Pain (4 - 6)    HOME MEDICATIONS:  amLODIPine 5 mg oral tablet: 1 tab(s) orally once a day  budesonide 3 mg oral capsule, extended release: 1 cap(s) orally 2 times a day  ferrous sulfate 325 mg (65 mg elemental iron) oral delayed release tablet: 1 tab(s) orally once a day  furosemide 20 mg oral tablet: 1 tab(s) orally once a day  gabapentin 300 mg oral capsule: 1 cap(s) orally 3 times a day  Metoprolol Tartrate 50 mg oral tablet: 1 tab(s) orally once a day  montelukast 10 mg oral tablet: 1 tab(s) orally once a day (at bedtime)  predniSONE 10 mg oral tablet: 1 tab(s) orally once a day  risedronate 150 mg oral tablet: 1 tab(s) orally once a month  tacrolimus 0.5 mg oral capsule: 1 cap(s) orally every 12 hours  Vitamin B12 1000 mcg oral tablet: 1 tab(s) orally once a day  Vitamin C 1000 mg oral tablet: 1 tab(s) orally once a day  warfarin 2 mg oral tablet: 1 tab(s) orally once a day      Vital Signs:   T(F): 96.8 (08-14-19 @ 05:15), Max: 97 (08-13-19 @ 20:37)  HR: 74 (08-14-19 @ 05:15) (74 - 79)  BP: 159/77 (08-14-19 @ 05:15) (114/55 - 159/78)  RR: 18 (08-14-19 @ 05:15) (18 - 18)  SpO2: 95% (08-14-19 @ 02:19) (95% - 95%)    Physical Exam:   GENERAL: NAD, lying in bed, pleasant and conversive appearing stated age  HEENT: NCAT, PERRLA, EOMI   CHEST/LUNG: CTA, No wheezing, rhonchi, rales  HEART: Regular rate and rhythm; s1 s2 appreciated, No murmurs, rubs, or gallops  ABDOMEN: Soft, Nontender, Nondistended; Bowel sounds present  EXTREMITIES: LLE with noted area of erythema and warm to touch from anterior shin to knee. Both legs wrapped in ace wraps this morning that were clean dry and intact. No cyanosis, no clubbing, peripheral pulses 2+  NERVOUS SYSTEM:  Alert & Oriented X3, Non focal     Labs:                         11.7   8.69  )-----------( 227      ( 14 Aug 2019 06:59 )             38.0     Neutophil% 51.5, Lymphocyte% 39.0, Monocyte% 6.1, Bands% 1.4 08-14-19 @ 06:59    14 Aug 2019 06:59    146    |  107    |  14     ----------------------------<  118    3.5     |  26     |  0.9      Ca    8.5        14 Aug 2019 06:59  Phos  2.8       13 Aug 2019 22:01  Mg     1.8       14 Aug 2019 06:59    TPro  6.0    /  Alb  3.5    /  TBili  1.0    /  DBili  x      /  AST  18     /  ALT  27     /  AlkPhos  108    12 Aug 2019 12:40       pTT    29.3             ----< 1.83 INR  (08-14-19 @ 06:59)    20.90        PT    Culture - Blood (collected 08-12-19 @ 12:40)  Source: .Blood Blood-Peripheral  Preliminary Report (08-13-19 @ 17:02):    No growth to date.    Culture - Blood (collected 08-12-19 @ 12:12)  Source: .Blood Blood-Peripheral  Preliminary Report (08-13-19 @ 22:01):    No growth to date.    Radiology:   None Today    Assessment and Plan:   This is a 74 year old female with PMHx of Autoimmune hepatitis, HTN, DVT/PE due to Prothrombin gene on Coumadin, Asthma presented to the ED with a LLE wound/cellulitis     # Left lower extremity wound with noted area of cellulitis   - WBC 13.74 on admission; 8.69 today  - Noted area of erythema on LLE from anterior shin to knee with area of discharge noted  - Wound care to LLE for now: Xeroform wrap with Kerlix  - Duplex ordered and preliminary negative  - ID consult; Vancomycin BID. Trough ordered for today     # RLE chronic ulcer  - Dr. Kang following patient; plan for OR today for debridement of both leg wounds     # Autoimmune hepatitis   - Continue with Prednisone, Tacrolimus  - Budesonide non-formulary to be completed when patient medication brought in    # Prothrombin gene mutation  - INR 1.83; coumadin on hold right now for OR with Burn    # Hypertension  - Continue with Norvasc 5mg, Lasix 20mg, and Metoprolol Tartrate 50mg daily  - CONFIRMED Metoprolol dose with Pharmacy records.     # Asthma   - Continue with Singulair daily    Activity: As tolerated  Diet: NPO for OR   DVT ppx: Coumadin  GI ppx: Not indicated  Code Status: Full Code  DISPO: From home; pending OR today

## 2019-08-14 NOTE — CHART NOTE - NSCHARTNOTEFT_GEN_A_CORE
PACU ANESTHESIA ADMISSION NOTE      Procedure: Debridement of necrotic or infected skin and tissue: excisional debridement bilateral lower legs    Post op diagnosis:  Wound      ____  Intubated  TV:______       Rate: ______      FiO2: ______    ____ X Patent Airway    ___X_  Full return of protective reflexes    ____  Full recovery from anesthesia / back to baseline     Vitals:   T:           R:                  BP:                  Sat:                   P:   SEE ANESTHESIA RECORD    Mental Status:  ____ Awake   _____X Alert   _____ Drowsy   _____ Sedated    Nausea/Vomiting:  ____ NO  ____X__Yes,   See Post - Op Orders          Pain Scale (0-10):  _____    Treatment: ____ None    ____X See Post - Op/PCA Orders    Post - Operative Fluids:   ____ Oral   ____X See Post - Op Orders    Plan: Discharge:   ____Home       ____X_Floor     _____Critical Care    _____  Other:_________________    Comments:

## 2019-08-14 NOTE — PROGRESS NOTE ADULT - SUBJECTIVE AND OBJECTIVE BOX
ELDER, DONI  74y  Female      Patient is a 74y old  Female who presents with a chief complaint of right lower ext wound (13 Aug 2019 10:21)    also lt lower extrimities    REVIEW OF SYSTEMS:  CONSTITUTIONAL: No fever, weight loss, or fatigue  EYES: No eye pain, visual disturbances, or discharge  ENMT:  No difficulty hearing, tinnitus, vertigo; No sinus or throat pain  NECK: No pain or stiffness  BREASTS: No pain, masses, or nipple discharge  RESPIRATORY: No cough, wheezing, chills or hemoptysis; No shortness of breath  CARDIOVASCULAR: No chest pain, palpitations, dizziness, or leg swelling  GASTROINTESTINAL: No abdominal or epigastric pain. No nausea, vomiting, or hematemesis; No diarrhea or constipation. No melena or hematochezia.  GENITOURINARY: No dysuria, frequency, hematuria, or incontinence  NEUROLOGICAL: No headaches, memory loss, loss of strength, numbness, or tremors  SKIN: No itching, burning, rashes, or lesions   LYMPH NODES: No enlarged glands  ENDOCRINE: No heat or cold intolerance; No hair loss  MUSCULOSKELETAL: No joint pain or swelling; No muscle, back, lower extrimities pain+  PSYCHIATRIC: No depression, anxiety, mood swings, or difficulty sleeping  HEME/LYMPH: No easy bruising, or bleeding gums  ALLERY AND IMMUNOLOGIC: No hives or eczema  FAMILY HISTORY:  No pertinent family history in first degree relatives    T(C): 36 (08-14-19 @ 05:15), Max: 36.1 (08-13-19 @ 20:37)  HR: 74 (08-14-19 @ 05:15) (74 - 79)  BP: 159/77 (08-14-19 @ 05:15) (114/55 - 159/78)  RR: 18 (08-14-19 @ 05:15) (18 - 18)  SpO2: 95% (08-14-19 @ 02:19) (95% - 95%)  Wt(kg): --Vital Signs Last 24 Hrs  T(C): 36 (14 Aug 2019 05:15), Max: 36.1 (13 Aug 2019 20:37)  T(F): 96.8 (14 Aug 2019 05:15), Max: 97 (13 Aug 2019 20:37)  HR: 74 (14 Aug 2019 05:15) (74 - 79)  BP: 159/77 (14 Aug 2019 05:15) (114/55 - 159/78)  BP(mean): --  RR: 18 (14 Aug 2019 05:15) (18 - 18)  SpO2: 95% (14 Aug 2019 02:19) (95% - 95%)  No Known Allergies      PHYSICAL EXAM:  GENERAL: NAD,   HEAD:  Atraumatic, Normocephalic  EYES: EOMI, PERRLA, conjunctiva and sclera clear  ENMT: No tonsillar erythema, exudates, or enlargement; Moist mucous membranes,   NECK: Supple, No JVD, Normal thyroid  NERVOUS SYSTEM:  Alert & Oriented X3, Good concentration;   CHEST/LUNG: Clear to percussion bilaterally; No rales, rhonchi, wheezing, or rubs  HEART: Regular rate and rhythm; No murmurs, rubs, or gallops  ABDOMEN: Soft, Nontender, Nondistended; Bowel sounds present  EXTREMITIES:  both legs edema+,redness+  LYMPH: No lymphadenopathy noted  SKIN: No rashes or lesions      LABS:  08-14    146  |  107  |  14  ----------------------------<  118<H>  3.5   |  26  |  0.9    Ca    8.5      14 Aug 2019 06:59  Phos  2.8     08-13  Mg     1.8     08-14    TPro  6.0  /  Alb  3.5  /  TBili  1.0  /  DBili  x   /  AST  18  /  ALT  27  /  AlkPhos  108  08-12          RADIOLOGY & ADDITIONAL TESTS:    MEDICATION:  amLODIPine   Tablet 5 milliGRAM(s) Oral daily  chlorhexidine 4% Liquid 1 Application(s) Topical <User Schedule>  docusate sodium 100 milliGRAM(s) Oral two times a day  furosemide    Tablet 20 milliGRAM(s) Oral daily  gabapentin 300 milliGRAM(s) Oral three times a day  metoprolol tartrate 50 milliGRAM(s) Oral daily  montelukast 10 milliGRAM(s) Oral at bedtime  oxyCODONE    5 mG/acetaminophen 325 mG 1 Tablet(s) Oral every 6 hours PRN  predniSONE   Tablet 10 milliGRAM(s) Oral daily  senna 2 Tablet(s) Oral at bedtime  silver sulfADIAZINE 1% Cream 1 Application(s) Topical two times a day  tacrolimus 0.5 milliGRAM(s) Oral every 12 hours  vancomycin  IVPB 1000 milliGRAM(s) IV Intermittent every 12 hours      HEALTH ISSUES - PROBLEM Dx:  Suspected deep vein thrombosis (DVT)on coumadin  both legs full thickness skin infection going for debridement today,medically stable,on vanco  Autoimmune hepatitis on prednisone,tacrolimus  HTN on amlodipine,will monitor

## 2019-08-14 NOTE — BRIEF OPERATIVE NOTE - NSICDXBRIEFPOSTOP_GEN_ALL_CORE_FT
POST-OP DIAGNOSIS:  Wound 14-Aug-2019 13:10:54 traumatic infected wounds bilateal lower legs Enrike Kang

## 2019-08-14 NOTE — PROGRESS NOTE ADULT - SUBJECTIVE AND OBJECTIVE BOX
DONI PATRICK  74y, Female      OVERNIGHT EVENTS:  none    VITALS:  T(F): 96.8, Max: 97 (08-13-19 @ 20:37)  HR: 74  BP: 159/77  RR: 18Vital Signs Last 24 Hrs  T(C): 36 (14 Aug 2019 05:15), Max: 36.1 (13 Aug 2019 20:37)  T(F): 96.8 (14 Aug 2019 05:15), Max: 97 (13 Aug 2019 20:37)  HR: 74 (14 Aug 2019 05:15) (74 - 79)  BP: 159/77 (14 Aug 2019 05:15) (114/55 - 159/78)  BP(mean): --  RR: 18 (14 Aug 2019 05:15) (18 - 18)  SpO2: 95% (14 Aug 2019 02:19) (95% - 95%)    TESTS & MEASUREMENTS:                        11.7   8.69  )-----------( 227      ( 14 Aug 2019 06:59 )             38.0     08-14    146  |  107  |  14  ----------------------------<  118<H>  3.5   |  26  |  0.9    Ca    8.5      14 Aug 2019 06:59  Phos  2.8     08-13  Mg     1.8     08-14    TPro  6.0  /  Alb  3.5  /  TBili  1.0  /  DBili  x   /  AST  18  /  ALT  27  /  AlkPhos  108  08-12    LIVER FUNCTIONS - ( 12 Aug 2019 12:40 )  Alb: 3.5 g/dL / Pro: 6.0 g/dL / ALK PHOS: 108 U/L / ALT: 27 U/L / AST: 18 U/L / GGT: x             Culture - Blood (collected 08-12-19 @ 12:40)  Source: .Blood Blood-Peripheral  Preliminary Report (08-13-19 @ 17:02):    No growth to date.    Culture - Blood (collected 08-12-19 @ 12:12)  Source: .Blood Blood-Peripheral  Preliminary Report (08-13-19 @ 22:01):    No growth to date.            RADIOLOGY & ADDITIONAL TESTS:    ANTIBIOTICS:  vancomycin  IVPB 1000 milliGRAM(s) IV Intermittent every 12 hours

## 2019-08-14 NOTE — BRIEF OPERATIVE NOTE - NSICDXBRIEFPREOP_GEN_ALL_CORE_FT
PRE-OP DIAGNOSIS:  Wound 14-Aug-2019 13:10:09 traumatic infected wounds bilateral lower legs Enrike Kang

## 2019-08-14 NOTE — BRIEF OPERATIVE NOTE - NSICDXBRIEFPROCEDURE_GEN_ALL_CORE_FT
PROCEDURES:  Debridement of necrotic or infected skin and tissue 14-Aug-2019 13:09:10 excisional debridement bilateral lower legs Enrike Kang

## 2019-08-14 NOTE — PRE-ANESTHESIA EVALUATION ADULT - HEIGHT IN INCHES
Dr IRAHETA spoke with Peds gyn
I called and spoke with [facility] Gynecology regarding growing hymen tag with intermittent spotting, the message was forwarded to a nurse who should call back tomorrow to let us know whether or not she needs to be seen for this now.
I called back this morning, the nurse I spoke with said Dr. Asif Newell is out of the office today and will likely call to give recommendation tomorrow (Wednesday). I will call again tomorrow.
Per Dr Suzanne Castorena, call [facility] Pediatric Gynecology. Riaz Laura has a vaginal skin tag coming from her cervix. It has grown as she has grown. 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  ----------------------------  Called Dr Daja Simms [phone]. Was given options on a menu to select. When selection was made it did not go through to that department. Finally chose an option and was able to speak with someone. Was told that only the physician could speak with a physician there since Riaz Laura was not a current patient. Was transferred to [facility] . Was told they would provide the number for Dr IRAHETA to call in order to speak with Dr Cullen Christina stated there was not a pediatric gynecologist
4

## 2019-08-15 NOTE — PROGRESS NOTE ADULT - SUBJECTIVE AND OBJECTIVE BOX
DONI PATRICK  74y, Female      OVERNIGHT EVENTS:    no fevers,   PROCEDURES: Dr Kang 8/14  Debridement of necrotic or infected skin and tissue , excisional debridement bilateral lower legs    VITALS:  T(F): 97, Max: 97.5 (08-14-19 @ 14:10)  HR: 70  BP: 142/68  RR: 18Vital Signs Last 24 Hrs  T(C): 36.1 (15 Aug 2019 09:32), Max: 36.4 (14 Aug 2019 14:10)  T(F): 97 (15 Aug 2019 09:32), Max: 97.5 (14 Aug 2019 14:10)  HR: 70 (15 Aug 2019 09:32) (70 - 86)  BP: 142/68 (15 Aug 2019 09:32) (131/70 - 165/83)  BP(mean): --  RR: 18 (15 Aug 2019 09:32) (18 - 25)  SpO2: 98% (14 Aug 2019 14:10) (98% - 98%)    TESTS & MEASUREMENTS:                        12.3   9.33  )-----------( 285      ( 15 Aug 2019 08:01 )             40.3     08-15    143  |  105  |  18  ----------------------------<  148<H>  4.0   |  26  |  1.0    Ca    8.6      15 Aug 2019 08:01  Phos  2.8     08-13  Mg     1.9     08-15          Culture - Abscess with Gram Stain (collected 08-13-19 @ 08:46)  Source: .Abscess Leg - Left  Preliminary Report (08-15-19 @ 00:03):    Moderate Staphylococcus aureus    Moderate Gram positive cocci in pairs    Numerous Enterobacter cloacae complex    Culture - Blood (collected 08-12-19 @ 12:40)  Source: .Blood Blood-Peripheral  Preliminary Report (08-13-19 @ 17:02):    No growth to date.    Culture - Blood (collected 08-12-19 @ 12:12)  Source: .Blood Blood-Peripheral  Preliminary Report (08-13-19 @ 22:01):    No growth to date.            RADIOLOGY & ADDITIONAL TESTS:    ANTIBIOTICS:  vancomycin  IVPB 1000 milliGRAM(s) IV Intermittent every 12 hours

## 2019-08-15 NOTE — CONSULT NOTE ADULT - ASSESSMENT
BIlateral lower extremitiy wounds, improved   - s/p debridement   - wound care - xeroform, kerlix, ace BID   - f/u in clinic once discharged

## 2019-08-15 NOTE — PROGRESS NOTE ADULT - SUBJECTIVE AND OBJECTIVE BOX
Patient is a 74y old  Female who presents with a chief complaint of right lower ext wound    Admitted to Medicine with the primary diagnosis of Infected     Interval events:    PAST MEDICAL & SURGICAL HISTORY:  Autoimmune hepatitis  Other chronic pulmonary embolism without acute cor pulmonale  Essential hypertension  Autoimmune hepatitis treated with steroids  Asthma  DVT (deep venous thrombosis)  S/P debridement  History of back surgery      MEDICATIONS  (STANDING):  amLODIPine   Tablet 5 milliGRAM(s) Oral daily  chlorhexidine 4% Liquid 1 Application(s) Topical <User Schedule>  docusate sodium 100 milliGRAM(s) Oral two times a day  furosemide    Tablet 20 milliGRAM(s) Oral daily  gabapentin 300 milliGRAM(s) Oral three times a day  metoprolol tartrate 50 milliGRAM(s) Oral daily  montelukast 10 milliGRAM(s) Oral at bedtime  predniSONE   Tablet 10 milliGRAM(s) Oral daily  senna 2 Tablet(s) Oral at bedtime  silver sulfADIAZINE 1% Cream 1 Application(s) Topical two times a day  tacrolimus 0.5 milliGRAM(s) Oral every 12 hours  vancomycin  IVPB 1000 milliGRAM(s) IV Intermittent every 12 hours    MEDICATIONS  (PRN):  oxyCODONE    5 mG/acetaminophen 325 mG 1 Tablet(s) Oral every 6 hours PRN Moderate Pain (4 - 6)          Vital Signs Last 24 Hrs  T(C): 35.8 (15 Aug 2019 05:14), Max: 36.4 (14 Aug 2019 13:25)  T(F): 96.4 (15 Aug 2019 05:14), Max: 97.5 (14 Aug 2019 13:25)  HR: 77 (15 Aug 2019 05:14) (68 - 86)  BP: 165/83 (15 Aug 2019 05:14) (131/70 - 165/83)  BP(mean): --  RR: 18 (15 Aug 2019 05:14) (13 - 33)  SpO2: 98% (14 Aug 2019 14:10) (97% - 99%)  CAPILLARY BLOOD GLUCOSE        I&O's Summary    14 Aug 2019 07:01  -  15 Aug 2019 07:00  --------------------------------------------------------  IN: 100 mL / OUT: 0 mL / NET: 100 mL        Physical Exam:    -     General :     -      Cardiac:    -      Pulm:    -      GI:    -      Musculoskeletal:    -      Neuro:        Labs:                        12.3   9.33  )-----------( 285      ( 15 Aug 2019 08:01 )             40.3             08-14    146  |  107  |  14  ----------------------------<  118<H>  3.5   |  26  |  0.9    Ca    8.5      14 Aug 2019 06:59  Phos  2.8     08-13  Mg     1.8     08-14              PT/INR - ( 14 Aug 2019 06:59 )   PT: 20.90 sec;   INR: 1.83 ratio         PTT - ( 14 Aug 2019 06:59 )  PTT:29.3 sec        Culture - Abscess with Gram Stain (collected 13 Aug 2019 08:46)  Source: .Abscess Leg - Left  Preliminary Report (15 Aug 2019 00:03):    Moderate Staphylococcus aureus    Moderate Gram positive cocci in pairs    Numerous Enterobacter cloacae complex    Culture - Blood (collected 12 Aug 2019 12:40)  Source: .Blood Blood-Peripheral  Preliminary Report (13 Aug 2019 17:02):    No growth to date.    Culture - Blood (collected 12 Aug 2019 12:12)  Source: .Blood Blood-Peripheral  Preliminary Report (13 Aug 2019 22:01):    No growth to date.        Imaging:    ECG: Patient is a 74y old  Female who presents with a chief complaint of right lower ext wound    Admitted to Medicine with the primary diagnosis of Infected Chronic RLE ulcer with new LLE wound     Interval events: B/L LE Excisional Debridement 8/14/19    Today is hosp day 3  Patient is resting comfortably in bed  Denies pain, very happy this morning and Feels ready to go home  Tolerating diet with regular BM  Ambulates with a cane at baseline  Plan: D/C planning pending Burn clearance     PAST MEDICAL & SURGICAL HISTORY:  Autoimmune hepatitis  Other chronic pulmonary embolism without acute cor pulmonale  Essential hypertension  Autoimmune hepatitis treated with steroids  Asthma  DVT (deep venous thrombosis)  S/P debridement  History of back surgery      MEDICATIONS  (STANDING):  amLODIPine   Tablet 5 milliGRAM(s) Oral daily  chlorhexidine 4% Liquid 1 Application(s) Topical <User Schedule>  docusate sodium 100 milliGRAM(s) Oral two times a day  furosemide    Tablet 20 milliGRAM(s) Oral daily  gabapentin 300 milliGRAM(s) Oral three times a day  metoprolol tartrate 50 milliGRAM(s) Oral daily  montelukast 10 milliGRAM(s) Oral at bedtime  predniSONE   Tablet 10 milliGRAM(s) Oral daily  senna 2 Tablet(s) Oral at bedtime  silver sulfADIAZINE 1% Cream 1 Application(s) Topical two times a day  tacrolimus 0.5 milliGRAM(s) Oral every 12 hours  vancomycin  IVPB 1000 milliGRAM(s) IV Intermittent every 12 hours    MEDICATIONS  (PRN):  oxyCODONE    5 mG/acetaminophen 325 mG 1 Tablet(s) Oral every 6 hours PRN Moderate Pain (4 - 6)        Vital Signs Last 24 Hrs  T(C): 35.8 (15 Aug 2019 05:14), Max: 36.4 (14 Aug 2019 13:25)  T(F): 96.4 (15 Aug 2019 05:14), Max: 97.5 (14 Aug 2019 13:25)  HR: 77 (15 Aug 2019 05:14) (68 - 86)  BP: 165/83 (15 Aug 2019 05:14) (131/70 - 165/83)  BP(mean): --  RR: 18 (15 Aug 2019 05:14) (13 - 33)  SpO2: 98% (14 Aug 2019 14:10) (97% - 99%)  CAPILLARY BLOOD GLUCOSE        I&O's Summary    14 Aug 2019 07:01  -  15 Aug 2019 07:00  --------------------------------------------------------  IN: 100 mL / OUT: 0 mL / NET: 100 mL        Physical Exam:    -     General : NAD, Sitting comfortably in chair    -      Cardiac: S1/S2 appreciated RRR, no murmurs    -      Pulm: CTA b/l, no adventitious sounds    -      GI: Soft, NT, ND    -      Musculoskeletal: B/L LE wound covered in dressing no Oozing , no pain     -      Neuro: AO x 4        Labs:                        12.3   9.33  )-----------( 285      ( 15 Aug 2019 08:01 )             40.3             08-14    146  |  107  |  14  ----------------------------<  118<H>  3.5   |  26  |  0.9    Ca    8.5      14 Aug 2019 06:59  Phos  2.8     08-13  Mg     1.8     08-14              PT/INR - ( 14 Aug 2019 06:59 )   PT: 20.90 sec;   INR: 1.83 ratio         PTT - ( 14 Aug 2019 06:59 )  PTT:29.3 sec        Culture - Abscess with Gram Stain (collected 13 Aug 2019 08:46)  Source: .Abscess Leg - Left  Preliminary Report (15 Aug 2019 00:03):    Moderate Staphylococcus aureus    Moderate Gram positive cocci in pairs    Numerous Enterobacter cloacae complex    Culture - Blood (collected 12 Aug 2019 12:40)  Source: .Blood Blood-Peripheral  Preliminary Report (13 Aug 2019 17:02):    No growth to date.    Culture - Blood (collected 12 Aug 2019 12:12)  Source: .Blood Blood-Peripheral  Preliminary Report (13 Aug 2019 22:01):    No growth to date.        Imaging:    ECG:

## 2019-08-15 NOTE — CONSULT NOTE ADULT - SUBJECTIVE AND OBJECTIVE BOX
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 Burn consulted for wound care recommendations.     Interval history: Pt is POD #1 s/p debridement of bilateral lower extremities. No issues post-operatively. Patient reporting less pain and is ambulating more comfortably.     PAST MEDICAL & SURGICAL HISTORY:  Autoimmune hepatitis  Other chronic pulmonary embolism without acute cor pulmonale  Essential hypertension  Autoimmune hepatitis treated with steroids  Asthma  DVT (deep venous thrombosis)  S/P debridement  History of back surgery                          12.3   9.33  )-----------( 285      ( 15 Aug 2019 08:01 )             40.3   08-15    143  |  105  |  18  ----------------------------<  148<H>  4.0   |  26  |  1.0    Ca    8.6      15 Aug 2019 08:01  Phos  2.8     08-13  Mg     1.9     08-15               EXAM:  Right leg - ~10cm x 4cm open wound, with healthy granulation tissue at base of wound with scattered areas of black tissue 2ndary to hemostatic agent (surgicell) used in OR  Left leg - small ~2cm x 2cm to left distal portion of leg, also with healthy granulation tissue at base

## 2019-08-15 NOTE — PROGRESS NOTE ADULT - SUBJECTIVE AND OBJECTIVE BOX
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 History started when 5 days ago patient cut her left lower extremity on the lateral aspect on a staple that was protruding from a mirror. Subsequently on Saturday patient started to notice a red discoloration, swelling and increasing leg pain. Reports pain as a soreness, 5-7/10, made worse with ambulation, located from the shin to the foot. She does endorse drainage from the left lower ext wound. Denies fever, nausea, vomiting, abdominal pain,  No insects or tick bites.    Of note patient previously admitted for the right lower ext wound for which she had debridement and drainage - sees Dr. Kang. Prior cultures have shown presence of MRSA. (12 Aug 2019 17:10)    MEDICATIONS  (STANDING):  amLODIPine   Tablet 5 milliGRAM(s) Oral daily  chlorhexidine 4% Liquid 1 Application(s) Topical <User Schedule>  docusate sodium 100 milliGRAM(s) Oral two times a day  furosemide    Tablet 20 milliGRAM(s) Oral daily  gabapentin 300 milliGRAM(s) Oral three times a day  metoprolol tartrate 50 milliGRAM(s) Oral daily  montelukast 10 milliGRAM(s) Oral at bedtime  predniSONE   Tablet 10 milliGRAM(s) Oral daily  senna 2 Tablet(s) Oral at bedtime  silver sulfADIAZINE 1% Cream 1 Application(s) Topical two times a day  tacrolimus 0.5 milliGRAM(s) Oral every 12 hours  vancomycin  IVPB 1000 milliGRAM(s) IV Intermittent every 12 hours    MEDICATIONS  (PRN):  oxyCODONE    5 mG/acetaminophen 325 mG 1 Tablet(s) Oral every 6 hours PRN Moderate Pain (4 - 6)    INTERVAL EVENTS: Patient seen today, OOB in chair, feels good, has no pain    T(C): 36.1 (08-15-19 @ 09:32), Max: 36.4 (08-14-19 @ 13:25)  HR: 70 (08-15-19 @ 09:32) (68 - 86)  BP: 142/68 (08-15-19 @ 09:32) (131/70 - 165/83)  RR: 18 (08-15-19 @ 09:32) (13 - 33)  SpO2: 98% (08-14-19 @ 14:10) (97% - 99%)  Wt(kg): --Vital Signs Last 24 Hrs  T(C): 36.1 (15 Aug 2019 09:32), Max: 36.4 (14 Aug 2019 13:25)  T(F): 97 (15 Aug 2019 09:32), Max: 97.5 (14 Aug 2019 13:25)  HR: 70 (15 Aug 2019 09:32) (68 - 86)  BP: 142/68 (15 Aug 2019 09:32) (131/70 - 165/83)  BP(mean): --  RR: 18 (15 Aug 2019 09:32) (13 - 33)  SpO2: 98% (14 Aug 2019 14:10) (97% - 99%)    PHYSICAL EXAM:  GENERAL: NAD  NECK: Supple, No JVD  CHEST/LUNG: Clear; No wheezing  HEART: S1, S2, Regular rate and rhythm  ABDOMEN: Soft, Nontender, Nondistended; Bowel sounds present  EXTREMITIES: Tace edema  SKIN: Dressings intact    LABS:  Labs:                        12.3   9.33  )-----------( 285      ( 15 Aug 2019 08:01 )             40.3             08-15    143  |  105  |  18  ----------------------------<  148<H>  4.0   |  26  |  1.0    Ca    8.6      15 Aug 2019 08:01  Phos  2.8     08-13  Mg     1.9     08-15              PT/INR - ( 15 Aug 2019 08:01 )   PT: 23.30 sec;   INR: 2.04 ratio         PTT - ( 15 Aug 2019 08:01 )  PTT:35.4 sec                  Culture - Abscess with Gram Stain (collected 13 Aug 2019 08:46)  Source: .Abscess Leg - Left  Preliminary Report (15 Aug 2019 00:03):    Moderate Staphylococcus aureus    Moderate Gram positive cocci in pairs    Numerous Enterobacter cloacae complex    Culture - Blood (collected 12 Aug 2019 12:40)  Source: .Blood Blood-Peripheral  Preliminary Report (13 Aug 2019 17:02):    No growth to date.    Culture - Blood (collected 12 Aug 2019 12:12)  Source: .Blood Blood-Peripheral  Preliminary Report (13 Aug 2019 22:01):    No growth to date.      RADIOLOGY & ADDITIONAL TESTS:

## 2019-08-16 NOTE — DISCHARGE NOTE PROVIDER - NSDCFUADDINST_GEN_ALL_CORE_FT
Wound care  -Cleanse with Soap and Water  -Apply Xeroform. adaptic, Kerlex and Ace with tape  -Two times a day

## 2019-08-16 NOTE — PROGRESS NOTE ADULT - SUBJECTIVE AND OBJECTIVE BOX
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 History started when 5 days ago patient cut her left lower extremity on the lateral aspect on a staple that was protruding from a mirror. Subsequently on Saturday patient started to notice a red discoloration, swelling and increasing leg pain. Reports pain as a soreness, 5-7/10, made worse with ambulation, located from the shin to the foot. She does endorse drainage from the left lower ext wound. Denies fever, nausea, vomiting, abdominal pain,  No insects or tick bites.    Of note patient previously admitted for the right lower ext wound for which she had debridement and drainage - sees Dr. Kang. Prior cultures have shown presence of MRSA. (12 Aug 2019 17:10)    MEDICATIONS  (STANDING):  amLODIPine   Tablet 5 milliGRAM(s) Oral daily  chlorhexidine 4% Liquid 1 Application(s) Topical <User Schedule>  docusate sodium 100 milliGRAM(s) Oral two times a day  furosemide    Tablet 20 milliGRAM(s) Oral daily  gabapentin 300 milliGRAM(s) Oral three times a day  metoprolol tartrate 50 milliGRAM(s) Oral daily  montelukast 10 milliGRAM(s) Oral at bedtime  predniSONE   Tablet 10 milliGRAM(s) Oral daily  senna 2 Tablet(s) Oral at bedtime  silver sulfADIAZINE 1% Cream 1 Application(s) Topical two times a day  tacrolimus 0.5 milliGRAM(s) Oral every 12 hours  vancomycin  IVPB 750 milliGRAM(s) IV Intermittent every 12 hours    MEDICATIONS  (PRN):  oxyCODONE    5 mG/acetaminophen 325 mG 1 Tablet(s) Oral every 6 hours PRN Moderate Pain (4 - 6)    INTERVAL EVENTS: Patient seen this am without distress, without complaints, denies pain    T(C): 35.8 (08-15-19 @ 20:00), Max: 36.7 (08-15-19 @ 15:14)  HR: 84 (08-15-19 @ 20:00) (70 - 84)  BP: 169/83 (08-15-19 @ 20:00) (142/68 - 169/83)  RR: 18 (08-15-19 @ 20:00) (18 - 18)  SpO2: --  Wt(kg): --Vital Signs Last 24 Hrs  T(C): 35.8 (15 Aug 2019 20:00), Max: 36.7 (15 Aug 2019 15:14)  T(F): 96.5 (15 Aug 2019 20:00), Max: 98 (15 Aug 2019 15:14)  HR: 84 (15 Aug 2019 20:00) (70 - 84)  BP: 169/83 (15 Aug 2019 20:00) (142/68 - 169/83)  BP(mean): --  RR: 18 (15 Aug 2019 20:00) (18 - 18)  SpO2: --    PHYSICAL EXAM:  GENERAL: NAD  NECK: Supple, No JVD  CHEST/LUNG: Clear  HEART: S1, S2, Regular rate and rhythm  ABDOMEN: Soft, Nontender,  Bowel sounds present  EXTREMITIES: Trace edema, dressings in place    LABS:                        12.3   9.33  )-----------( 285      ( 15 Aug 2019 08:01 )             40.3             08-15    143  |  105  |  18  ----------------------------<  148<H>  4.0   |  26  |  1.0    Ca    8.6      15 Aug 2019 08:01  Mg     1.9     08-15            PT/INR - ( 15 Aug 2019 08:01 )   PT: 23.30 sec;   INR: 2.04 ratio        PTT - ( 15 Aug 2019 08:01 )  PTT:35.4 sec                  Culture - Abscess with Gram Stain (collected 13 Aug 2019 08:46)  Source: .Abscess Leg - Left  Preliminary Report (15 Aug 2019 00:03):    Moderate Staphylococcus aureus    Moderate Gram positive cocci in pairs    Numerous Enterobacter cloacae complex      RADIOLOGY & ADDITIONAL TESTS:

## 2019-08-16 NOTE — DISCHARGE NOTE PROVIDER - HOSPITAL COURSE
73 yo F with PMH of autoimmune hepatitis on tacro/pred, HTN, DVT/PE 2/2 Prothrombin gene mutation on Coumadin, Hx MRSA wound infections, asthma presented to ED with trauma to the LLE. Patient with recent hospitalization for right leg cellulitis.         Left lower extremity Non-purulent Cellulitis    -Sepsis ruled out on admission, Duplex neg for DVT's     -Wound Abscess from 8/12: MRSA     -S/P  Excisional Debridement 8/14/19, F/up surgical Cultures      -Burn F/up out patient     - ID on Board: D/C with

## 2019-08-16 NOTE — DISCHARGE NOTE PROVIDER - NSDCCPCAREPLAN_GEN_ALL_CORE_FT
PRINCIPAL DISCHARGE DIAGNOSIS  Diagnosis: Cellulitis  Assessment and Plan of Treatment: Bilateral lower extremity cellulitis, Please finish your antibiotics as prescribed and follow-up with your PMD in 1-2 weeks

## 2019-08-16 NOTE — PROGRESS NOTE ADULT - ASSESSMENT
· Assessment		  73 y/o F PMH autoimmune hepatitis and chronic RLE cellulitis followed by Dr. Kang presents with pain and erythema of LLE after traumatic injury to soft tissue consistent with non-purulent cellulitis.     Impression:  Left Lower Extremity Cellulitis, Non-purulent  No sepsis   BCx 8/12 NG  WCx 8/12 ORSA    RECOMMENDATIONS:  Vancomycin 1g q12  Debridement planned   Wound care and dressing change as per Burn
Assessment and Recommendation:   · Assessment		  ASSESSMENT  75 yo F with PMH of autoimmune hepatitis on tacro/pred, HTN, DVT/PE 2/2 Prothrombin gene mutation on Coumadin, Hx MRSA wound infections, asthma presented to ED with trauma to the LLE. Patient with recent hospitalization for right leg cellulitis.     LLE cellulitis  - condition improved  - post debridement by burn  - local care   - hx of MRSA  - antibiotics as per ID,  IV Vanco continues, to transition to PO for discharge  - pain medication as needed    Hx of autoimmune hepatitis  - on Tacrolimus and Prednisone  - level on chart    Hx of DVT, IVC, PE, Prothrombin gene mutation  - INR 2.04    Patient on Contact Isolation due to MRSA  Nutritional Support  Discharge planning as per burn
Assessment and Recommendation:   · Assessment		  ASSESSMENT  75 yo F with PMH of autoimmune hepatitis on tacro/pred, HTN, DVT/PE 2/2 Prothrombin gene mutation on Coumadin, Hx MRSA wound infections, asthma presented to ED with trauma to the LLE. Patient with recent hospitalization for right leg cellulitis.     LLE cellulitis  - condition stable  - post debridement by burn, POD #2  - local care as per burn  - hx of MRSA  - antibiotics as per ID,  IV Vanco continues, dose adjusted by ID  - pain medication as needed    Hx of autoimmune hepatitis  - on Tacrolimus and Prednisone    Hx of DVT, IVC, PE, Prothrombin gene mutation  - INR 2.04    Patient on Contact Isolation due to MRSA  Nutritional Support  Discharge planning as per burn
73 yo F with PMH of autoimmune hepatitis on tacro/pred, HTN, DVT/PE 2/2 Prothrombin gene mutation on Coumadin, Hx MRSA wound infections, asthma presented to ED with trauma to the LLE. Patient with recent hospitalization for right leg cellulitis.     Left lower extremity Non-purulent Cellulitis  -Sepsis ruled out on admission, Duplex neg for DVT's   -S/P  Excisional Debridement 8/14/19, F/up surgical Cultures    -Patient denies pain, and is  move around   -Wound care per Burn  - ID on Board:  Vancomycin 1000mg BID: Trough: 24  -D/C  pending clearance by Burn     RLE chronic ulcer  - Dr. Kang following patient  -S/P Excisional Debridement 8/14/19, F/up surgical Cultures     Autoimmune hepatitis   - Continue with Prednisone, Tacrolimus  - Budesonide non-formulary to be completed when patient medication brought in    Prothrombin gene mutation complicated by DVT' s and PE's   -Coumadin 2mg and Daily INR Levels     H/O Essential Hypertension: At goal for age  - C/w  Norvasc and  Metoprolol      Asthma: stable   - C/w Singular     DVT ppx: on Coumadin   GI ppx: Not indicated  Full Code  DISPO: From home;   -D/C pending clearance from Burn
· Assessment		  75 y/o F PMH autoimmune hepatitis and chronic RLE cellulitis followed by Dr. Kang presents with pain and erythema of LLE after traumatic injury to soft tissue consistent with non-purulent cellulitis.     Impression:  Left Lower Extremity Cellulitis, Non-purulent  S/p debridement 8/14 of LEs bilaterally  No sepsis   BCx 8/12 NG  WCx 8/12 ORSA, enterobacter ( colonizers )  Vanco level24.1    RECOMMENDATIONS:  Po Clindamycin 300 mg q8h for 8 more days  Recall prn please
· Assessment		  · Assessment		  75 y/o F PMH autoimmune hepatitis and chronic RLE cellulitis followed by Dr. Kang presents with pain and erythema of LLE after traumatic injury to soft tissue consistent with non-purulent cellulitis.     Impression:  Left Lower Extremity Cellulitis, Non-purulent  S/p debridement 8/14 of LEs bilaterally  No sepsis   BCx 8/12 NG  WCx 8/12 ORSA, enterobacter ( colonizers )  Vanco level24.1    RECOMMENDATIONS:  Change Vancomycin 750 mg iv q12  Wound care and dressing change as per Burn

## 2019-08-16 NOTE — DISCHARGE NOTE NURSING/CASE MANAGEMENT/SOCIAL WORK - NSDCDPATPORTLINK_GEN_ALL_CORE
You can access the [email or web address], offered by [facility], by registering with the following website: [email or web address]

## 2019-08-16 NOTE — DISCHARGE NOTE PROVIDER - CARE PROVIDER_API CALL
Sandrine Valladares)  Medicine  [address] [place] [id number]  Phone: [phone]  Fax: [phone]  Follow Up Time:     Enrike Kang)  Plastic Surgery  [address] [place] [address]  Phone: [phone]  Fax: [phone]  Follow Up Time:

## 2019-08-16 NOTE — PROGRESS NOTE ADULT - SUBJECTIVE AND OBJECTIVE BOX
DONI PATRICK  74y, Female      OVERNIGHT EVENTS:    no fevers, feels well, has no pain in LEs    VITALS:  T(F): 96.6, Max: 98 (08-15-19 @ 15:14)  HR: 75  BP: 153/70  RR: 18Vital Signs Last 24 Hrs  T(C): 35.9 (16 Aug 2019 05:11), Max: 36.7 (15 Aug 2019 15:14)  T(F): 96.6 (16 Aug 2019 05:11), Max: 98 (15 Aug 2019 15:14)  HR: 75 (16 Aug 2019 05:11) (75 - 84)  BP: 153/70 (16 Aug 2019 05:11) (146/75 - 169/83)  BP(mean): --  RR: 18 (16 Aug 2019 05:11) (18 - 18)  SpO2: --    TESTS & MEASUREMENTS:                        12.8   9.93  )-----------( 275      ( 16 Aug 2019 08:10 )             40.9     08-16    144  |  105  |  16  ----------------------------<  157<H>  3.8   |  26  |  1.0    Ca    8.8      16 Aug 2019 08:10  Mg     1.9     08-15          Culture - Abscess with Gram Stain (collected 08-13-19 @ 08:46)  Source: .Abscess Leg - Left  Preliminary Report (08-16-19 @ 02:29):    Moderate Methicillin resistant Staphylococcus aureus    Moderate Enterococcus faecalis (vancomycin resistant)    Numerous Enterobacter cloacae complex  Organism: Methicillin resistant Staphylococcus aureus  Enterococcus faecalis (vancomycin resistant) (08-16-19 @ 02:21)  Organism: Enterococcus faecalis (vancomycin resistant) (08-16-19 @ 02:21)      -  Ampicillin: S <=2 Predicts results to ampicillin/sulbactam, amoxacillin-clavulanate and  piperacillin-tazobactam.      -  Daptomycin: S 1      -  Levofloxacin: R >4      -  Linezolid: S 2      -  Tetra/Doxy: R >8      -  Vancomycin: R >16      Method Type: EROS  Organism: Methicillin resistant Staphylococcus aureus (08-16-19 @ 02:19)      -  Ampicillin/Sulbactam: R 16/8      -  Cefazolin: R <=4      -  Clindamycin: S 0.5      -  Daptomycin: S 0.5      -  Erythromycin: R >4      -  Gentamicin: S <=1 Should not be used as monotherapy      -  Linezolid: S 4      -  Oxacillin: R >2      -  Penicillin: R >8      -  RIF- Rifampin: S <=1 Should not be used as monotherapy      -  Tetra/Doxy: S 4      -  Trimethoprim/Sulfamethoxazole: S <=0.5/9.5      -  Vancomycin: S 2      Method Type: EROS    Culture - Blood (collected 08-12-19 @ 12:40)  Source: .Blood Blood-Peripheral  Preliminary Report (08-13-19 @ 17:02):    No growth to date.    Culture - Blood (collected 08-12-19 @ 12:12)  Source: .Blood Blood-Peripheral  Preliminary Report (08-13-19 @ 22:01):    No growth to date.            RADIOLOGY & ADDITIONAL TESTS:    ANTIBIOTICS:  vancomycin  IVPB 750 milliGRAM(s) IV Intermittent every 12 hours DISPLAY PLAN FREE TEXT DISPLAY PLAN FREE TEXT

## 2019-08-16 NOTE — DISCHARGE NOTE PROVIDER - CARE PROVIDERS DIRECT ADDRESSES
[email or web address]

## 2019-08-17 NOTE — PROGRESS NOTE ADULT - SUBJECTIVE AND OBJECTIVE BOX
ELDER, DONI  74y  Female      Patient is a 74y old  Female who presents with a chief complaint of right lower ext wound (16 Aug 2019 10:10)        REVIEW OF SYSTEMS:  CONSTITUTIONAL: No fever, weight loss, or fatigue  EYES: No eye pain, visual disturbances, or discharge  ENMT:  No difficulty hearing, tinnitus, vertigo; No sinus or throat pain  NECK: No pain or stiffness  BREASTS: No pain, masses, or nipple discharge  RESPIRATORY: No cough, wheezing, chills or hemoptysis; No shortness of breath  CARDIOVASCULAR: No chest pain, palpitations, dizziness, or leg swelling  GASTROINTESTINAL: No abdominal or epigastric pain. No nausea, vomiting, or hematemesis; No diarrhea or constipation. No melena or hematochezia.  GENITOURINARY: No dysuria, frequency, hematuria, or incontinence  NEUROLOGICAL: No headaches, memory loss, loss of strength, numbness, or tremors  SKIN: No itching, burning, rashes, or lesions   LYMPH NODES: No enlarged glands  ENDOCRINE: No heat or cold intolerance; No hair loss  MUSCULOSKELETAL: No joint pain or swelling; No muscle, back, or extremity pain  PSYCHIATRIC: No depression, anxiety, mood swings, or difficulty sleeping  HEME/LYMPH: No easy bruising, or bleeding gums  ALLERY AND IMMUNOLOGIC: No hives or eczema  FAMILY HISTORY:  No pertinent family history in first degree relatives    T(C): 35.9 (08-17-19 @ 05:15), Max: 35.9 (08-17-19 @ 05:15)  HR: 75 (08-17-19 @ 05:15) (74 - 77)  BP: 170/81 (08-17-19 @ 05:15) (134/79 - 170/81)  RR: 18 (08-17-19 @ 05:15) (18 - 18)  SpO2: --  Wt(kg): --Vital Signs Last 24 Hrs  T(C): 35.9 (17 Aug 2019 05:15), Max: 35.9 (17 Aug 2019 05:15)  T(F): 96.6 (17 Aug 2019 05:15), Max: 96.6 (17 Aug 2019 05:15)  HR: 75 (17 Aug 2019 05:15) (74 - 77)  BP: 170/81 (17 Aug 2019 05:15) (134/79 - 170/81)  BP(mean): --  RR: 18 (17 Aug 2019 05:15) (18 - 18)  SpO2: --  No Known Allergies      PHYSICAL EXAM:  GENERAL: NAD, well-groomed, well-developed  HEAD:  Atraumatic, Normocephalic  EYES: EOMI, PERRLA, conjunctiva and sclera clear  ENMT: No tonsillar erythema, exudates, or enlargement;   NECK: Supple, No JVD, Normal thyroid  NERVOUS SYSTEM:  Alert & Oriented X3, Good concentration;  CHEST/LUNG: Clear to percussion bilaterally; No rales, rhonchi, wheezing, or rubs  HEART: Regular rate and rhythm; No murmurs, rubs, or gallops  ABDOMEN: Soft, Nontender, Nondistended; Bowel sounds present  EXTREMITIES:  2+ Peripheral Pulses, No clubbing, cyanosis, or edema  LYMPH: No lymphadenopathy noted  SKIN: No rashes or lesions      LABS:  08-16    144  |  105  |  16  ----------------------------<  157<H>  3.8   |  26  |  1.0    Ca    8.8      16 Aug 2019 08:10                            12.8   9.93  )-----------( 275      ( 16 Aug 2019 08:10 )             40.9         RADIOLOGY & ADDITIONAL TESTS:    MEDICATION:  amLODIPine   Tablet 5 milliGRAM(s) Oral daily  chlorhexidine 4% Liquid 1 Application(s) Topical <User Schedule>  clindamycin   Capsule 300 milliGRAM(s) Oral every 8 hours  docusate sodium 100 milliGRAM(s) Oral two times a day  furosemide    Tablet 20 milliGRAM(s) Oral daily  gabapentin 300 milliGRAM(s) Oral three times a day  metoprolol tartrate 50 milliGRAM(s) Oral daily  montelukast 10 milliGRAM(s) Oral at bedtime  oxyCODONE    5 mG/acetaminophen 325 mG 1 Tablet(s) Oral every 6 hours PRN  predniSONE   Tablet 10 milliGRAM(s) Oral daily  senna 2 Tablet(s) Oral at bedtime  tacrolimus 0.5 milliGRAM(s) Oral every 12 hours      HEALTH ISSUES - PROBLEM Dx:  both legs cellulitis s/p debridement  HTn on amlodipine,metoprolol  hx PE ,dvt on coumadin  autoimmune hepatitis on prednisone,tacrolimus  neuropathy stable d/c home with [facility] service today

## 2019-08-17 NOTE — PROGRESS NOTE ADULT - REASON FOR ADMISSION
right lower ext wound
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
## 2019-08-26 NOTE — PHYSICAL EXAM
[Healing] : healing [Size%: ______] : Size: [unfilled]% [Infected?] : Infected: No [Abnormal] : abnormal [6] : 6 out of 10 [Large] : medium [] : yes [de-identified] : percocet [de-identified] : bilateal lower leg wound healing--> xeroform [TWNoteComboBox1] : xeroform [TWNoteComboBox2] : False
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
## 2019-09-16 NOTE — PHYSICAL EXAM
[Healing] : healing [Size%: ______] : Size: [unfilled]% [Infected?] : Infected: No [6] : 6 out of 10 [Abnormal] : abnormal [Large] : medium [] : no [de-identified] : bilateal lower leg wound healing--> xeroform [de-identified] : percocet [TWNoteComboBox1] : xeroform

## 2019-09-23 PROBLEM — S81.801D OPEN WOUND OF RIGHT LOWER EXTREMITY, SUBSEQUENT ENCOUNTER: Status: ACTIVE | Noted: 2018-05-17

## 2019-09-23 NOTE — PHYSICAL EXAM
[Healing] : healing [Size%: ______] : Size: [unfilled]% [3] : 3 out of 10 [Abnormal] : abnormal [Large] : medium [Infected?] : Infected: No [] : no [de-identified] : bilateral lower leg wound healing--> xeroform [TWNoteComboBox1] : xeroform
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
## 2019-10-10 PROBLEM — L97.919 ULCER OF RIGHT LEG: Status: ACTIVE | Noted: 2018-05-18

## 2019-11-01 PROBLEM — I26.99 PULMONARY EMBOLISM: Status: ACTIVE | Noted: 2018-05-18

## 2019-11-01 PROBLEM — J45.909 ASTHMA: Status: ACTIVE | Noted: 2017-06-27

## 2019-11-01 PROBLEM — D68.52 PROTHROMBIN GENE MUTATION: Status: ACTIVE | Noted: 2018-05-18

## 2019-11-01 PROBLEM — L03.116 CELLULITIS OF LEFT LOWER LEG: Status: ACTIVE | Noted: 2018-08-08

## 2019-11-01 PROBLEM — Z86.718 HISTORY OF DVT (DEEP VEIN THROMBOSIS): Status: ACTIVE | Noted: 2018-05-18

## 2019-11-05 PROBLEM — S81.802D WOUND OF LEFT LOWER EXTREMITY, SUBSEQUENT ENCOUNTER: Status: ACTIVE | Noted: 2018-09-03

## 2019-11-14 NOTE — ED PROVIDER NOTE - PHYSICAL EXAMINATION
CONSTITUTIONAL: well-appearing, in NAD  SKIN: 10 cm lac overlying L knee, lac explored in full ROM of knee and no tendon injury identified. wound explored to bloodless field. Bleeding controlled. Abrasion to R arm.  HEAD: NCAT  EYES: EOMI, PERRLA, no scleral icterus, conjunctiva pink  ENT: normal pharynx with no erythema or exudates  NECK: Supple; non tender. Full ROM.  CARD: RRR, no murmurs.  RESP: clear to ausculation b/l. No crackles or wheezing.  ABD: soft, non-tender, non-distended, no rebound or guarding.  EXT: Full ROM, no bony tenderness, + pedal edema L>R, no calf tenderness  NEURO: normal motor. normal sensory.  PSYCH: Cooperative, appropriate.
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
## 2019-11-14 NOTE — ED PROVIDER NOTE - CARE PLAN
Principal Discharge DX:	Laceration of left lower extremity, initial encounter  Secondary Diagnosis:	Fall, subsequent encounter  Secondary Diagnosis:	Abrasions of multiple sites

## 2019-11-14 NOTE — ED ADULT NURSE NOTE - OBJECTIVE STATEMENT
Patient presents to ED s/p fall c/o left knee pain and swelling and JACQUELINE lac with hematoma. denies LOC. denies head trauma. Patient taking coumadin.

## 2019-11-14 NOTE — ED ADULT NURSE NOTE - NSIMPLEMENTINTERV_GEN_ALL_ED
Implemented All Fall with Harm Risk Interventions:  [place] to call system. Call bell, personal items and telephone within reach. Instruct patient to call for assistance. Room bathroom lighting operational. Non-slip footwear when patient is off stretcher. Physically safe environment: no spills, clutter or unnecessary equipment. Stretcher in lowest position, wheels locked, appropriate side rails in place. Provide visual cue, wrist band, yellow gown, etc. Monitor gait and stability. Monitor for mental status changes and reorient to person, place, and time. Review medications for side effects contributing to fall risk. Reinforce activity limits and safety measures with patient and family. Provide visual clues: red socks.

## 2019-11-14 NOTE — ED PROVIDER NOTE - CLINICAL SUMMARY MEDICAL DECISION MAKING FREE TEXT BOX
75 y/o female presented to ED s/p fall, with 10cm laceration to knee. Labs, XR's within normal limits. No evidence of joint involvement. Laceration repaired by surgery, will d/c with outpt f/up. Results reviewed and discussed with pt and printed for patient. Anticipatory guidance given including close outpatient followup. Strict return precautions given. Pt verbalizes understanding of and agrees with plan.

## 2019-11-14 NOTE — ED PROVIDER NOTE - PROGRESS NOTE DETAILS
Unable to approximate skin appropriately. D/w surgery. Will come see pt. Endorsed to AILYN Keyes. pt endorsed to  [facility]- pending surgery for wound closure, recs received signout from Dr. moody - Case d/w surgical resident DR. Osorio - will close lac, labs pending - anticipate d/c if labs wnl;

## 2019-11-14 NOTE — ED PROVIDER NOTE - NS ED ROS FT
Constitutional:  (-) fever, (-) chills, (-) lethargy  Eyes:  (-) eye pain (-) visual changes  ENMT: (-) nasal discharge, (-) sore throat. (-) neck pain or stiffness  Cardiac: (-) chest pain (-) palpitations  Respiratory:  (-) cough (-) respiratory distress.   GI:  (-) nausea (-) vomiting (-) diarrhea (-) abdominal pain.  :  (-) dysuria (-) frequency (-) burning.  MS:  (-) back pain (-) joint pain.  Neuro:  (-) headache (-) numbness (-) tingling (-) focal weakness  Skin:  (+) lac  Except as documented in the HPI,  all other systems are negative
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
## 2019-11-14 NOTE — ED PROVIDER NOTE - CARE PROVIDER_API CALL
Pamela Schmidt)  Internal Medicine  [address] [place] [address]  Phone: [phone]  Fax: [phone]  Established Patient  Follow Up Time: 1-3 Days

## 2019-11-14 NOTE — ED PROVIDER NOTE - NSFOLLOWUPINSTRUCTIONS_ED_ALL_ED_FT
Laceration Care, Adult  ImageA laceration is a cut that goes through all of the layers of the skin and into the tissue that is right under the skin. Some lacerations heal on their own. Others need to be closed with stitches (sutures), staples, skin adhesive strips, or skin glue. Proper laceration care minimizes the risk of infection and helps the laceration to heal better.    How is this treated?  If sutures or staples were used:     Keep the wound clean and dry.  If you were given a bandage (dressing), you should change it at least one time per day or as told by your health care provider. You should also change it if it becomes wet or dirty.  Keep the wound completely dry for the first 24 hours or as told by your health care provider. After that time, you may shower or bathe. However, make sure that the wound is not soaked in water until after the sutures or staples have been removed.  Clean the wound one time each day or as told by your health care provider:    Wash the wound with soap and water.  Rinse the wound with water to remove all soap.  Pat the wound dry with a clean towel. Do not rub the wound.    After cleaning the wound, apply a thin layer of antibiotic ointment as told by your health care provider. This will help to prevent infection and keep the dressing from sticking to the wound.  Have the sutures or staples removed as told by your health care provider.  If skin adhesive strips were used:     Keep the wound clean and dry.  If you were given a bandage (dressing), you should change it at least one time per day or as told by your health care provider. You should also change it if it becomes dirty or wet.  Do not get the skin adhesive strips wet. You may shower or bathe, but be careful to keep the wound dry.  If the wound gets wet, pat it dry with a clean towel. Do not rub the wound.  Skin adhesive strips fall off on their own. You may trim the strips as the wound heals. Do not remove skin adhesive strips that are still stuck to the wound. They will fall off in time.  If skin glue was used:     Try to keep the wound dry, but you may briefly wet it in the shower or bath. Do not soak the wound in water, such as by swimming.  After you have showered or bathed, gently pat the wound dry with a clean towel. Do not rub the wound.  Do not do any activities that will make you sweat heavily until the skin glue has fallen off on its own.  Do not apply liquid, cream, or ointment medicine to the wound while the skin glue is in place. Using those may loosen the film before the wound has healed.  If you were given a bandage (dressing), you should change it at least one time per day or as told by your health care provider. You should also change it if it becomes dirty or wet.  If a dressing is placed over the wound, be careful not to apply tape directly over the skin glue. Doing that may cause the glue to be pulled off before the wound has healed.  Do not pick at the glue. The skin glue usually remains in place for 5–10 days, then it falls off of the skin.  General Instructions     Take over-the-counter and prescription medicines only as told by your health care provider.  If you were prescribed an antibiotic medicine or ointment, take or apply it as told by your doctor. Do not stop using it even if your condition improves.  To help prevent scarring, make sure to cover your wound with sunscreen whenever you are outside after stitches are removed, after adhesive strips are removed, or when glue remains in place and the wound is healed. Make sure to wear a sunscreen of at least 30 SPF.  Do not scratch or pick at the wound.  Keep all follow-up visits as told by your health care provider. This is important.  Check your wound every day for signs of infection. Watch for:    Redness, swelling, or pain.  Fluid, blood, or pus.    Raise (elevate) the injured area above the level of your heart while you are sitting or lying down, if possible.  Contact a health care provider if:  You received a tetanus shot and you have swelling, severe pain, redness, or bleeding at the injection site.  You have a fever.  A wound that was closed breaks open.  You notice a bad smell coming from your wound or your dressing.  You notice something coming out of the wound, such as wood or glass.  Your pain is not controlled with medicine.  You have increased redness, swelling, or pain at the site of your wound.  You have fluid, blood, or pus coming from your wound.  You notice a change in the color of your skin near your wound.  You need to change the dressing frequently due to fluid, blood, or pus draining from the wound.  You develop a new rash.  You develop numbness around the wound.  Get help right away if:  You develop severe swelling around the wound.  Your pain suddenly increases and is severe.  You develop painful lumps near the wound or on skin that is anywhere on your body.  You have a red streak going away from your wound.  The wound is on your hand or foot and you cannot properly move a finger or toe.  The wound is on your hand or foot and you notice that your fingers or toes look pale or bluish.  This information is not intended to replace advice given to you by your health care provider. Make sure you discuss any questions you have with your health care provider.    Fall Prevention in the Home    Falls can cause injuries. They can happen to people of all ages. There are many things you can do to make your home safe and to help prevent falls.     WHAT CAN I DO ON THE OUTSIDE OF MY HOME?  Regularly fix the edges of walkways and driveways and fix any cracks.  Remove anything that might make you trip as you walk through a door, such as a raised step or threshold.  Trim any bushes or trees on the path to your home.  Use bright outdoor lighting.  Clear any walking paths of anything that might make someone trip, such as rocks or tools.  Regularly check to see if handrails are loose or broken. Make sure that both sides of any steps have handrails.  Any raised decks and porches should have guardrails on the edges.  Have any leaves, snow, or ice cleared regularly.  Use sand or salt on walking paths during winter.  Clean up any spills in your garage right away. This includes oil or grease spills.    WHAT CAN I DO IN THE BATHROOM?  Use night lights.  Install grab bars by the toilet and in the tub and shower. Do not use towel bars as grab bars.  Use non-skid mats or decals in the tub or shower.  If you need to sit down in the shower, use a plastic, non-slip stool.  Keep the floor dry. Clean up any water that spills on the floor as soon as it happens.  Remove soap buildup in the tub or shower regularly.  Attach bath mats securely with double-sided non-slip rug tape.  Do not have throw rugs and other things on the floor that can make you trip.    WHAT CAN I DO IN THE BEDROOM?  Use night lights.  Make sure that you have a light by your bed that is easy to reach.  Do not use any sheets or blankets that are too big for your bed. They should not hang down onto the floor.  Have a firm chair that has side arms. You can use this for support while you get dressed.  Do not have throw rugs and other things on the floor that can make you trip.    WHAT CAN I DO IN THE KITCHEN?  Clean up any spills right away.  Avoid walking on wet floors.  Keep items that you use a lot in easy-to-reach places.  If you need to reach something above you, use a strong step stool that has a grab bar.  Keep electrical cords out of the way.  Do not use floor polish or wax that makes floors slippery. If you must use wax, use non-skid floor wax.  Do not have throw rugs and other things on the floor that can make you trip.    WHAT CAN I DO WITH MY STAIRS?  Do not leave any items on the stairs.  Make sure that there are handrails on both sides of the stairs and use them. Fix handrails that are broken or loose. Make sure that handrails are as long as the stairways.  Check any carpeting to make sure that it is firmly attached to the stairs. Fix any carpet that is loose or worn.  Avoid having throw rugs at the top or bottom of the stairs. If you do have throw rugs, attach them to the floor with carpet tape.  Make sure that you have a light switch at the top of the stairs and the bottom of the stairs. If you do not have them, ask someone to add them for you.    WHAT ELSE CAN I DO TO HELP PREVENT FALLS?  Wear shoes that:  Do not have high heels.  Have rubber bottoms.  Are comfortable and fit you well.  Are closed at the toe. Do not wear sandals.  If you use a stepladder:  Make sure that it is fully opened. Do not climb a closed stepladder.  Make sure that both sides of the stepladder are locked into place.  Ask someone to hold it for you, if possible.  Clearly sukhdeep and make sure that you can see:  Any grab bars or handrails.  First and last steps.  Where the edge of each step is.  Use tools that help you move around (mobility aids) if they are needed. These include:  Canes.  Walkers.  Scooters.  Crutches.  Turn on the lights when you go into a dark area. Replace any light bulbs as soon as they burn out.  Set up your furniture so you have a clear path. Avoid moving your furniture around.  If any of your floors are uneven, fix them.  If there are any pets around you, be aware of where they are.  Review your medicines with your doctor. Some medicines can make you feel dizzy. This can increase your chance of falling.    Ask your doctor what other things that you can do to help prevent falls.    ADDITIONAL NOTES AND INSTRUCTIONS    Please follow up with your Primary MD in 24-48 hr.  Seek immediate medical care for any new/worsening signs or symptoms.

## 2019-11-14 NOTE — ED PROVIDER NOTE - PATIENT PORTAL LINK FT
You can access the FollowMyHealth Patient Portal offered by [facility] by registering at the following website: [email or web address]. By joining [employer]’s FollowMyHealth portal, you will also be able to view your health information using other applications (apps) compatible with our system.

## 2019-11-15 NOTE — CONSULT NOTE ADULT - SUBJECTIVE AND OBJECTIVE BOX
DONI PATRICK [id number]  74y Female    HPI: 74f presents to ED with left knee laceration s/p fall. The patient denies any pain at the area, the laceration extends laterally over the knee, the lateral portion is difficult to approximate.       PAST MEDICAL & SURGICAL HISTORY:  Autoimmune hepatitis  Other chronic pulmonary embolism without acute cor pulmonale  Essential hypertension  Autoimmune hepatitis treated with steroids  Asthma  DVT (deep venous thrombosis)  S/P debridement  History of back surgery        MEDICATIONS  (STANDING):    MEDICATIONS  (PRN):      Allergies    No Known Allergies    Intolerances        REVIEW OF SYSTEMS    [ ] A ten-point review of systems was otherwise negative except as noted.  [ ] Due to altered mental status/intubation, subjective information were not able to be obtained from the patient. History was obtained, to the extent possible, from review of the chart and collateral sources of information.      Vital Signs Last 24 Hrs  T(C): 36.6 (14 Nov 2019 17:47), Max: 36.6 (14 Nov 2019 17:47)  T(F): 97.8 (14 Nov 2019 17:47), Max: 97.8 (14 Nov 2019 17:47)  HR: 84 (14 Nov 2019 23:36) (84 - 114)  BP: 139/71 (14 Nov 2019 23:36) (139/71 - 170/106)  BP(mean): --  RR: 18 (14 Nov 2019 23:36) (18 - 18)  SpO2: 99% (14 Nov 2019 23:36) (98% - 99%)    PHYSICAL EXAM:  GENERAL: NAD, well-appearing  CHEST/LUNG: Clear to auscultation bilaterally  HEART: Regular rate and rhythm  ABDOMEN: Soft, Nontender, Nondistended;   EXTREMITIES:  left knee laceration extends laterally over left knee, lateral aspect is difficult to approximate, subcutaneous tissue exposed. Not actively bleeding. No clubbing, cyanosis, or edema      LABS:  Labs:  CAPILLARY BLOOD GLUCOSE                              11.5   13.53 )-----------( 290      ( 15 Nov 2019 00:05 )             36.8         11-15    144  |  106  |  29<H>  ----------------------------<  124<H>  4.1   |  24  |  1.1      Calcium, Total Serum: 8.7 mg/dL (11-15-19 @ 00:05)      LFTs:             5.6  | 0.3  | 16       ------------------[120     ( 15 Nov 2019 00:05 )  3.4  | x    | 28          Lipase:x      Amylase:x             Coags:     37.40  ----< 3.29    ( 15 Nov 2019 00:05 )     36.9            RADIOLOGY & ADDITIONAL STUDIES:  < from: Xray Pelvis AP only (11.14.19 @ 20:51) >  Impression:     No evidence of acute fracture.    < end of copied text >

## 2019-11-15 NOTE — CONSULT NOTE ADULT - ASSESSMENT
74f presents with left knee laceration s/p fall.  -Left knee cleaned, lidocaine injected and medial aspect of wound sutured, lateral aspect of wound covered with adaptec. Wound covered with 4x4 and wrapped with kerlex  -Clindamycin sent to pharmacy along with several percocet.  -The patient can follow up with Dr. Kang in 1 week. Dressing to be changed once daily. Patient was given instructions on how to do so.

## 2019-11-21 NOTE — PHYSICAL EXAM
[New] : new [Size%: ______] : Size: [unfilled]% [Infected?] : Infected: No [2] : 2 out of 10 [Abnormal] : abnormal [Large] : medium [] : no [de-identified] : new 9x6cm traumatic wound left leg and knee--> sutures in place with exposed fat--> xeroform [TWNoteComboBox1] : xeroform

## 2019-11-21 NOTE — REASON FOR VISIT
[Initial] : initial visit [Were you seen in the Emergency Room?] : seen in the emergency room [Were you admitted to the burn center at [facility]?] : not admitted to the burn center at [facility] [FreeTextEntry2] : new left leg wound

## 2019-11-21 NOTE — ASSESSMENT
[FreeTextEntry1] : new 9x6cm traumatic wound left leg and knee--> sutures in place with exposed fat--> xeroform [Wound Care] : wound care

## 2019-11-21 NOTE — HISTORY OF PRESENT ILLNESS
[Did you have an operation on your burn/wound injury?] : Did you have an operation on your burn/wound injury? No [Did this injury occur on the job?] : Did this injury occur on the job? No [de-identified] : 11/14/19 [de-identified] : Home [de-identified] : new traumatic wound left lower leg [de-identified] : Left lower leg wound open skin

## 2019-12-05 NOTE — ED PROVIDER NOTE - NS ED ROS FT
Constitutional:  no fevers, no chills, no malaise  ENMT: No neck pain or stiffness, no nasal congestion, no ear pain, no throat pain  Cardiac:  No chest pain  Respiratory:  No cough or sob  GI:  No nausea, vomiting, diarrhea or abdominal pain.  MS:  see hpi  Neuro:  No headache, no dizziness, no change in mental status  Except as documented in the HPI,  all other systems are negative

## 2019-12-05 NOTE — H&P ADULT - NSHPPHYSICALEXAM_GEN_ALL_CORE
PHYSICAL EXAM:  GENERAL: NAD, well-developed  HEAD:  Atraumatic, Normocephalic  CHEST/LUNG: speaking in full sentences, equal chest rise   EXTREMITIES: LLE wound  2+ Peripheral Pulses, No clubbing, cyanosis, or edema  SKIN: No rashes or lesions PHYSICAL EXAM:  GENERAL: NAD, well-developed  HEAD:  Atraumatic, Normocephalic  CHEST/LUNG: speaking in full sentences, equal chest rise   EXTREMITIES: ~ 9cm x 6cm Left knee wound with sutures in place with surrounding blackish and yellowish necrotic tissue, (+) erythema , small amount of serosanguineous exudate,  (+) tenderness with palpation. mild surrounding edema to left knee, 2+ distal peripheral pulses,

## 2019-12-05 NOTE — ED ADULT NURSE NOTE - CHPI ED NUR SYMPTOMS NEG
no dizziness/no fever/no vomiting/no decreased eating/drinking/no chills/no nausea/no weakness/no tingling

## 2019-12-05 NOTE — ED ADULT NURSE NOTE - OBJECTIVE STATEMENT
Patient is a 74 year old female presents to ED for left leg wound sent in by MD Kang for possible OR debridement of left leg wound. Patient denies any fever, chills, abdominal pain, chest pain, or SOB.

## 2019-12-05 NOTE — H&P ADULT - NSHPLABSRESULTS_GEN_ALL_CORE
10.4   10.31 )-----------( 360      ( 05 Dec 2019 16:35 )             34.7   12-05    146  |  105  |  25<H>  ----------------------------<  204<H>  3.7   |  25  |  1.2    Ca    8.6      05 Dec 2019 16:35  Phos  4.2     12-05  Mg     2.0     12-05    TPro  5.7<L>  /  Alb  3.2<L>  /  TBili  0.3  /  DBili  x   /  AST  18  /  ALT  29  /  AlkPhos  152<H>  12-05

## 2019-12-05 NOTE — ED PROVIDER NOTE - CLINICAL SUMMARY MEDICAL DECISION MAKING FREE TEXT BOX
pt sent in by burn attending for admission to go to OR for wound debriedment of left leg.  pt had prior injury a few weeks ago.  no new injury.  no fevers, no chills, no nausea, no vomiting.  pt with infected left leg wound.  Pt given iv abx.  burn consulted. pt accepted for admission to the burn service. pt sent in by burn attending for admission to go to OR for wound debriedment of left leg.  pt had prior injury a few weeks ago.  no new injury.  no fevers, no chills, no nausea, no vomiting.  pt with infected left leg wound.  Pt given iv abx.  burn consulted. pt accepted for admission to the burn service.  left leg with infected wound, no crepitus.  DP pulse intact. motor/sensaiton intact.

## 2019-12-05 NOTE — ED ADULT NURSE NOTE - NSIMPLEMENTINTERV_GEN_ALL_ED
Implemented All Fall Risk Interventions:  [place] to call system. Call bell, personal items and telephone within reach. Instruct patient to call for assistance. Room bathroom lighting operational. Non-slip footwear when patient is off stretcher. Physically safe environment: no spills, clutter or unnecessary equipment. Stretcher in lowest position, wheels locked, appropriate side rails in place. Provide visual cue, wrist band, yellow gown, etc. Monitor gait and stability. Monitor for mental status changes and reorient to person, place, and time. Review medications for side effects contributing to fall risk. Reinforce activity limits and safety measures with patient and family.

## 2019-12-05 NOTE — H&P ADULT - HISTORY OF PRESENT ILLNESS
Griselda Hudson is a 75 y/o female with pmhx of asthma, HTN,  autoimmune hepatitis, genetic hypercoagulable disorder (on coumadin) presents to the ED for left lower extremity wound infection. pt had a fall 3 weeks ago and sustained a left knee laceration where she was seen in the ED the day of the incident. Pt's left knee was sutured and was discharged. Pt was prescribed PO antibiotic and pain medication and was following up in burn clinic. Pt was seen in burn clinic today and was sent in by Dr. Kang for possible OR tomorrow for debridement of left leg wound and IV antibiotics.  Pt c/o of increase pain, swelling and redness to left lower extremity wound. Pt denies fevers, chills,  nausea, vomiting, diarrhea, SOB, or chest pain. Griselda Hudson is a 75 y/o female with pmhx of asthma, HTN,  autoimmune hepatitis, genetic hypercoagulable disorder (hx of PE and DVT) (on coumadin) presents to the ED for left lower extremity wound infection. pt had a fall 3 weeks ago and sustained a left knee laceration where she was seen in the ED the day of the incident. Pt's left knee was sutured and was discharged. Pt was prescribed PO antibiotic and pain medication and was following up in burn clinic. Pt was seen in burn clinic today and was sent in by Dr. Kang for possible OR tomorrow for debridement of left leg wound and IV antibiotics.  Pt c/o of increase pain, swelling and redness to left lower extremity wound. Pt denies fevers, chills,  nausea, vomiting, diarrhea, SOB, or chest pain.

## 2019-12-05 NOTE — ED PROVIDER NOTE - PHYSICAL EXAMINATION
CONSTITUTIONAL: Well-developed; well-nourished; in no acute distress, nontoxic appearing  SKIN: infected wound to left leg  HEAD: Normocephalic; atraumatic.  EYES:  conjunctiva and sclera clear.  ENT: MMM, no nasal congestion  NECK: Supple; non tender.  ROM intact.  RESP: pt breathing comfortably, no distress  EXT: LLE:  2+ DP pulse, + wound to left leg with erythema and discharge, no active bleeding  NEURO: awake, alert, following commands, oriented, grossly unremarkable. GCS 15.   PSYCH: Cooperative, appropriate.

## 2019-12-05 NOTE — ED ADULT NURSE REASSESSMENT NOTE - NS ED NURSE REASSESS COMMENT FT1
patient assessed, patient alert and oriented x4. Patient complains of left leg pain. Tylenol 650mg PO given, IV antibiotics given as per MD orders, vital signs stable. Patient admitted to medicine under Burn service. As per Burn PA plan is to go to OR tomorrow for possible debridement

## 2019-12-05 NOTE — PROGRESS NOTE ADULT - SUBJECTIVE AND OBJECTIVE BOX
infected left knee/ leg --> traumatic wound    Vital Signs Last 24 Hrs  T(C): 35.9 (05 Dec 2019 15:11), Max: 35.9 (05 Dec 2019 15:11)  T(F): 96.7 (05 Dec 2019 15:11), Max: 96.7 (05 Dec 2019 15:11)  HR: 89 (05 Dec 2019 15:11) (89 - 89)  BP: 135/63 (05 Dec 2019 15:11) (135/63 - 135/63)  BP(mean): --  RR: 17 (05 Dec 2019 15:11) (17 - 17)  SpO2: 95% (05 Dec 2019 15:11) (95% - 95%)    CVP:  T(C): 35.9 (12-05-19 @ 15:11), Max: 35.9 (12-05-19 @ 15:11)  HR: 89 (12-05-19 @ 15:11) (89 - 89)  BP: 135/63 (12-05-19 @ 15:11) (135/63 - 135/63)  RR: 17 (12-05-19 @ 15:11) (17 - 17)  SpO2: 95% (12-05-19 @ 15:11) (95% - 95%)  CVP(mm Hg): --    U.O.:  I&O's Detail                                10.4   10.31 )-----------( 360      ( 05 Dec 2019 16:35 )             34.7     12-05    146  |  105  |  25<H>  ----------------------------<  204<H>  3.7   |  25  |  1.2    Ca    8.6      05 Dec 2019 16:35  Phos  4.2     12-05  Mg     2.0     12-05    TPro  5.7<L>  /  Alb  3.2<L>  /  TBili  0.3  /  DBili  x   /  AST  18  /  ALT  29  /  AlkPhos  152<H>  12-05      Large Dressing Change--> adherent necrotic tissue left leg

## 2019-12-05 NOTE — H&P ADULT - ASSESSMENT
73 y/o female with pmhx of asthma, HTN,  autoimmune hepatitis, genetic hypercoagulable disorder (hx of PE and DVT) (on coumadin) presents to the ED for left lower extremity wound infection.    #LLE Wound infection  -admit to burn service  - plan for OR tomorrow for debridement of LLE wound  -IV antibiotic  -IV fluids  -wound care- (wash wound with soap and water, cover with xeroform and wrap with kerlix/ACE bandage twice a day)  -pain management  -pre-op labs  -NPO after midnight  -DVT/GI ppx    #Cards  -pt has hx of HTN  - monitor BP  -continue home medications    # GI  -pt has a hx of autoimmune hepatitis   -will continue steroids inpatient    #Hem  pt has a history of genetic hypercoagulable disorder (DVT and PE in the past)  -pt currently on coumadin   -will hold for OR  - INR is 1.70 will start heparin drip ??    #Pulm  pt has hx of asthma  -continue home medications

## 2019-12-05 NOTE — ED PROVIDER NOTE - OBJECTIVE STATEMENT
75 yo f sent in by Dr. Kang for admission to go to the OR tomorrow for wound to left leg.  pt had injury to leg a few weeks ago and now has infected wound on leg.  pt followed up with Dr. Kang today who sent her to the ED for admission for IV abx, pre op labs.    no fevers, no chills, no nausea, no vomiting, no other complaints.

## 2019-12-05 NOTE — ED PROVIDER NOTE - PROGRESS NOTE DETAILS
I spoke to burn who will come to see pt.  Pt to be admitted to burn service on the floor.  will give unasyn

## 2019-12-06 NOTE — PROGRESS NOTE ADULT - SUBJECTIVE AND OBJECTIVE BOX
stable    Vital Signs Last 24 Hrs  T(C): 35.6 (06 Dec 2019 20:28), Max: 36.6 (06 Dec 2019 15:34)  T(F): 96 (06 Dec 2019 20:28), Max: 97.8 (06 Dec 2019 15:34)  HR: 81 (06 Dec 2019 20:28) (76 - 89)  BP: 135/82 (06 Dec 2019 20:28) (135/82 - 182/82)  BP(mean): --  RR: 18 (06 Dec 2019 20:28) (18 - 18)  SpO2: 99% (06 Dec 2019 15:34) (99% - 99%)    CVP:  T(C): 35.6 (12-06-19 @ 20:28), Max: 36.6 (12-06-19 @ 15:34)  HR: 81 (12-06-19 @ 20:28) (76 - 89)  BP: 135/82 (12-06-19 @ 20:28) (135/82 - 182/82)  RR: 18 (12-06-19 @ 20:28) (18 - 18)  SpO2: 99% (12-06-19 @ 15:34) (99% - 99%)  CVP(mm Hg): --    U.O.:  I&O's Detail    05 Dec 2019 07:01  -  06 Dec 2019 07:00  --------------------------------------------------------  IN:    IV PiggyBack: 100 mL    sodium chloride 0.9%: 300 mL  Total IN: 400 mL    OUT:    Voided: 250 mL  Total OUT: 250 mL    Total NET: 150 mL                                        11.0   9.43  )-----------( 375      ( 06 Dec 2019 18:06 )             37.2     12-06    144  |  104  |  19  ----------------------------<  133<H>  3.7   |  25  |  1.0    Ca    8.7      06 Dec 2019 18:06  Phos  3.4     12-06  Mg     1.9     12-06    TPro  5.9<L>  /  Alb  3.4<L>  /  TBili  0.4  /  DBili  x   /  AST  14  /  ALT  24  /  AlkPhos  141<H>  12-06      Large Dressing Change--> left leg and knee wound with adherent necrotic tissue

## 2019-12-06 NOTE — PHYSICAL THERAPY INITIAL EVALUATION ADULT - GENERAL OBSERVATIONS, REHAB EVAL
chart reviewed - pt encountered supine in bed in ED in NAD - son bedside - agreeable to PT IE - Nurse medicated patient during session

## 2019-12-06 NOTE — PROGRESS NOTE ADULT - ASSESSMENT
traumatic left knee and leg wound with adherent necrotic tissue --> iv abx, local wound care, debridement

## 2019-12-06 NOTE — PHYSICAL THERAPY INITIAL EVALUATION ADULT - PERTINENT HX OF CURRENT PROBLEM, REHAB EVAL
Griselda Hudson is a 73 y/o female with pmhx of asthma, HTN,  autoimmune hepatitis, genetic hypercoagulable disorder (hx of PE and DVT) (on coumadin) presents to the ED for left lower extremity wound infection.

## 2019-12-07 NOTE — CHART NOTE - NSCHARTNOTEFT_GEN_A_CORE
PACU ANESTHESIA PACU ADMISSION NOTE      Procedure:Evacuation of hematoma of lower leg    Post op diagnosisTraumatic hematoma      ____ Intubated  TV:______       Rate: ______      FiO2: ______    __x__ Patent Airway    ____x Full return of protective reflexes    ____ Full recovery from anesthesia / sedation to baseline status    Viitals:  see anesthesia record            Mental Status:  __x__ Awake   _____ Alert   _____ Drowsy   _____ Sedated    Nausea/Vomiting: ____ Yes, See Post - Op Orders      _x___ No    Pain Scale (0-10): _____    Treatment: ____ None    ___x_ See Post - Op/PCA Orders    Post - Operative Fluids:   ____ Oral   __x__ See Post - Op Orders    Plan:         Discharge:   ____Home       ____x_Floor         _____Critical Care    _____Other:_________________    Comments: uneventful perioperative course; no s/s of anesthesia complications noted; D/C floor when criteria met

## 2019-12-08 NOTE — DISCHARGE NOTE NURSING/CASE MANAGEMENT/SOCIAL WORK - PATIENT PORTAL LINK FT
You can access the FollowMyHealth Patient Portal offered by [facility] by registering at the following website: [email or web address]. By joining [employer]’s FollowMyHealth portal, you will also be able to view your health information using other applications (apps) compatible with our system.

## 2019-12-08 NOTE — DISCHARGE NOTE PROVIDER - CARE PROVIDERS DIRECT ADDRESSES
[email or web address]

## 2019-12-08 NOTE — DISCHARGE NOTE PROVIDER - NSFOLLOWUPCLINICS_GEN_ALL_ED_FT
[facility]  Burn  [address], [address]  [place] [address]  Phone: [phone]  Fax:   Follow Up Time:

## 2019-12-08 NOTE — DISCHARGE NOTE PROVIDER - CARE PROVIDER_API CALL
Enrike Kang)  Plastic Surgery  [address] [place] [address]  Phone: [phone]  Fax: [phone]  Follow Up Time:     Larry Cary)  Plastic Surgery  [address] [place] [address]  Phone: [phone]  Fax: [phone]  Follow Up Time:

## 2019-12-08 NOTE — PROGRESS NOTE ADULT - REASON FOR ADMISSION
Left lower extremity infected wound

## 2019-12-08 NOTE — DISCHARGE NOTE PROVIDER - NSDCCPCAREPLAN_GEN_ALL_CORE_FT
PRINCIPAL DISCHARGE DIAGNOSIS  Diagnosis: Infected wound  Assessment and Plan of Treatment: OK to discharge home with follow up as outpatient  Follow up with burn clinic within 1 week. Call [phone] to schedule appointment. Burn Clinic located at [address] [place] [address], [facility] [address], [place] [address].   Continue wound care as prescribed. Wash wounds twice daily and apply xeroform, kerlix and ACE wrap to left leg wounds.   Take antibiotics and pain medications as prescribed.   Continue home medications as originally prescribed.

## 2019-12-08 NOTE — PROGRESS NOTE ADULT - SUBJECTIVE AND OBJECTIVE BOX
POD#1    Vital Signs Last 24 Hrs  T(C): 36.5 (08 Dec 2019 12:27), Max: 36.5 (08 Dec 2019 12:27)  T(F): 97.7 (08 Dec 2019 12:27), Max: 97.7 (08 Dec 2019 12:27)  HR: 69 (08 Dec 2019 12:27) (69 - 90)  BP: 147/76 (08 Dec 2019 12:27) (130/83 - 175/-)  BP(mean): --  RR: 18 (08 Dec 2019 12:27) (18 - 18)  SpO2: --    CVP:  T(C): 36.5 (12-08-19 @ 12:27), Max: 36.5 (12-08-19 @ 12:27)  HR: 69 (12-08-19 @ 12:27) (69 - 90)  BP: 147/76 (12-08-19 @ 12:27) (130/83 - 175/-)  RR: 18 (12-08-19 @ 12:27) (18 - 18)  SpO2: --  CVP(mm Hg): --    U.O.:  I&O's Detail    07 Dec 2019 07:01  -  08 Dec 2019 07:00  --------------------------------------------------------  IN:    Oral Fluid: 240 mL  Total IN: 240 mL    OUT:    Voided: 300 mL  Total OUT: 300 mL    Total NET: -60 mL                                        11.2   9.82  )-----------( 373      ( 07 Dec 2019 16:51 )             37.9     12-07    145  |  104  |  13  ----------------------------<  180<H>  3.8   |  28  |  1.0    Ca    8.4<L>      07 Dec 2019 16:51  Phos  3.6     12-07  Mg     1.7     12-07    TPro  5.9<L>  /  Alb  3.4<L>  /  TBili  0.4  /  DBili  x   /  AST  14  /  ALT  24  /  AlkPhos  141<H>  12-06      Large Dressing Change--> right anterior thigh skin tear--> wound, no infection    left knee and lower leg--> open wounds post hematoma evac

## 2019-12-08 NOTE — DISCHARGE NOTE PROVIDER - HOSPITAL COURSE
73 y/o female with pmhx of asthma, HTN,  autoimmune hepatitis, genetic hypercoagulable disorder (hx of PE and DVT) (on coumadin) presents to the ED for left lower extremity wound infection. pt had a fall 3 weeks ago and sustained a left knee laceration where she was seen in the ED the day of the incident. Pt's left knee was sutured and was discharged. Pt was prescribed PO antibiotic and pain medication and was following up in burn clinic. Pt was seen in burn clinic and was sent in by Dr. Kang for debridement of left leg wound and IV antibiotics.  Decision was made to complete debridement of the left lower extremity on 12/7/2019 and IV antibiotics and wound care was continued for another day. After evaluation of wound the following day, patient was determined appropriate for discharge home with outpatient follow up.

## 2019-12-08 NOTE — DISCHARGE NOTE PROVIDER - NSDCMRMEDTOKEN_GEN_ALL_CORE_FT
amLODIPine 5 mg oral tablet: 1 tab(s) orally once a day  amoxicillin-clavulanate 875 mg-125 mg oral tablet: 1 tab(s) orally 2 times a day  budesonide 3 mg oral capsule, extended release: 1 cap(s) orally 2 times a day  ciprofloxacin 500 mg oral tablet: 1 tab(s) orally every 12 hours  ferrous sulfate 325 mg (65 mg elemental iron) oral delayed release tablet: 1 tab(s) orally once a day  furosemide 20 mg oral tablet: 1 tab(s) orally once a day  gabapentin 300 mg oral capsule: 1 cap(s) orally 3 times a day  Metoprolol Tartrate 50 mg oral tablet: 1 tab(s) orally once a day  montelukast 10 mg oral tablet: 1 tab(s) orally once a day (at bedtime)  oxycodone-acetaminophen 5 mg-325 mg oral tablet: 1 tab(s) orally 3 to 4 times a day, As Needed -Moderate Pain (4 - 6) MDD:4  pantoprazole 40 mg oral delayed release tablet: 1 tab(s) orally once a day (before a meal)  predniSONE 10 mg oral tablet: 1 tab(s) orally once a day  risedronate 150 mg oral tablet: 1 tab(s) orally once a month  senna oral tablet: 2 tab(s) orally once a day (at bedtime), As Needed -for constipation   tacrolimus 0.5 mg oral capsule: 1 cap(s) orally every 12 hours  Vitamin B12 1000 mcg oral tablet: 1 tab(s) orally once a day  Vitamin C 1000 mg oral tablet: 1 tab(s) orally once a day  warfarin 2 mg oral tablet: 1 tab(s) orally once a day

## 2019-12-08 NOTE — PROGRESS NOTE ADULT - ATTENDING COMMENTS
traumatic hematomas left leg post alden and skin teat righ ant thigh--> abx, d/c home, local wound care

## 2019-12-18 NOTE — REASON FOR VISIT
[Revisit] : revisit [Were you seen in the Emergency Room?] : seen in the emergency room [Were you admitted to the burn center at [facility]?] : not admitted to the burn center at [facility] [FreeTextEntry2] : new left leg wound

## 2019-12-18 NOTE — PHYSICAL EXAM
[Healing] : healing [Size%: ______] : Size: [unfilled]% [Infected?] : Infected: Yes [2] : 2 out of 10 [Abnormal] : abnormal [Large] : medium [] : no [de-identified] : left leg wounds with necrotic tissue and infection--> admit for iv abx and debridement [TWNoteComboBox1] : xeroform

## 2019-12-18 NOTE — HISTORY OF PRESENT ILLNESS
[Did you have an operation on your burn/wound injury?] : Did you have an operation on your burn/wound injury? No [Did this injury occur on the job?] : Did this injury occur on the job? No [de-identified] : 11/14/19 [de-identified] : Home [de-identified] : traumatic wound left lower leg [de-identified] : Left lower leg wound --> infected

## 2019-12-18 NOTE — ASSESSMENT
[FreeTextEntry1] : left leg wounds with necrotic tissue and infection--> admit for iv abx and debridement [Wound Care] : wound care
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
## 2020-01-01 ENCOUNTER — RESULT REVIEW (OUTPATIENT)
Age: 75
End: 2020-01-01

## 2020-01-01 ENCOUNTER — TRANSCRIPTION ENCOUNTER (OUTPATIENT)
Age: 75
End: 2020-01-01

## 2020-01-01 ENCOUNTER — INPATIENT (INPATIENT)
Facility: HOSPITAL | Age: 75
LOS: 39 days | End: 2020-03-11
Attending: INTERNAL MEDICINE | Admitting: INTERNAL MEDICINE
Payer: MEDICARE

## 2020-01-01 ENCOUNTER — INPATIENT (INPATIENT)
Facility: HOSPITAL | Age: 75
LOS: 6 days | Discharge: NURSING, REHAB OR LONG-TERM CARE | End: 2020-01-20
Attending: INTERNAL MEDICINE | Admitting: INTERNAL MEDICINE
Payer: MEDICARE

## 2020-01-01 ENCOUNTER — APPOINTMENT (OUTPATIENT)
Dept: BURN CARE | Facility: CLINIC | Age: 75
End: 2020-01-01

## 2020-01-01 ENCOUNTER — OUTPATIENT (OUTPATIENT)
Dept: OUTPATIENT SERVICES | Facility: HOSPITAL | Age: 75
LOS: 1 days | Discharge: HOME | End: 2020-01-01

## 2020-01-01 ENCOUNTER — APPOINTMENT (OUTPATIENT)
Dept: BURN CARE | Facility: CLINIC | Age: 75
End: 2020-01-01
Payer: MEDICARE

## 2020-01-01 VITALS
RESPIRATION RATE: 15 BRPM | HEART RATE: 81 BPM | DIASTOLIC BLOOD PRESSURE: 64 MMHG | TEMPERATURE: 98 F | SYSTOLIC BLOOD PRESSURE: 130 MMHG

## 2020-01-01 VITALS
DIASTOLIC BLOOD PRESSURE: 94 MMHG | OXYGEN SATURATION: 98 % | SYSTOLIC BLOOD PRESSURE: 200 MMHG | TEMPERATURE: 98 F | RESPIRATION RATE: 20 BRPM | HEART RATE: 68 BPM

## 2020-01-01 VITALS — OXYGEN SATURATION: 96 % | HEART RATE: 135 BPM

## 2020-01-01 DIAGNOSIS — D89.9 DISORDER INVOLVING THE IMMUNE MECHANISM, UNSPECIFIED: ICD-10-CM

## 2020-01-01 DIAGNOSIS — D61.818 OTHER PANCYTOPENIA: ICD-10-CM

## 2020-01-01 DIAGNOSIS — R04.89 HEMORRHAGE FROM OTHER SITES IN RESPIRATORY PASSAGES: ICD-10-CM

## 2020-01-01 DIAGNOSIS — K62.6 ULCER OF ANUS AND RECTUM: ICD-10-CM

## 2020-01-01 DIAGNOSIS — K75.4 AUTOIMMUNE HEPATITIS: ICD-10-CM

## 2020-01-01 DIAGNOSIS — Z87.891 PERSONAL HISTORY OF NICOTINE DEPENDENCE: ICD-10-CM

## 2020-01-01 DIAGNOSIS — S81.809S UNSPECIFIED OPEN WOUND, UNSPECIFIED LOWER LEG, SEQUELA: ICD-10-CM

## 2020-01-01 DIAGNOSIS — J95.851 VENTILATOR ASSOCIATED PNEUMONIA: ICD-10-CM

## 2020-01-01 DIAGNOSIS — Z98.890 OTHER SPECIFIED POSTPROCEDURAL STATES: Chronic | ICD-10-CM

## 2020-01-01 DIAGNOSIS — A49.02 METHICILLIN RESISTANT STAPHYLOCOCCUS AUREUS INFECTION, UNSPECIFIED SITE: ICD-10-CM

## 2020-01-01 DIAGNOSIS — E87.2 ACIDOSIS: ICD-10-CM

## 2020-01-01 DIAGNOSIS — D68.52 PROTHROMBIN GENE MUTATION: ICD-10-CM

## 2020-01-01 DIAGNOSIS — E87.0 HYPEROSMOLALITY AND HYPERNATREMIA: ICD-10-CM

## 2020-01-01 DIAGNOSIS — Z79.01 LONG TERM (CURRENT) USE OF ANTICOAGULANTS: ICD-10-CM

## 2020-01-01 DIAGNOSIS — E87.1 HYPO-OSMOLALITY AND HYPONATREMIA: ICD-10-CM

## 2020-01-01 DIAGNOSIS — J15.6 PNEUMONIA DUE TO OTHER GRAM-NEGATIVE BACTERIA: ICD-10-CM

## 2020-01-01 DIAGNOSIS — J12.9 VIRAL PNEUMONIA, UNSPECIFIED: ICD-10-CM

## 2020-01-01 DIAGNOSIS — I27.82 CHRONIC PULMONARY EMBOLISM: ICD-10-CM

## 2020-01-01 DIAGNOSIS — I82.492 ACUTE EMBOLISM AND THROMBOSIS OF OTHER SPECIFIED DEEP VEIN OF LEFT LOWER EXTREMITY: ICD-10-CM

## 2020-01-01 DIAGNOSIS — R26.9 UNSPECIFIED ABNORMALITIES OF GAIT AND MOBILITY: ICD-10-CM

## 2020-01-01 DIAGNOSIS — M48.54XA COLLAPSED VERTEBRA, NOT ELSEWHERE CLASSIFIED, THORACIC REGION, INITIAL ENCOUNTER FOR FRACTURE: ICD-10-CM

## 2020-01-01 DIAGNOSIS — R04.2 HEMOPTYSIS: ICD-10-CM

## 2020-01-01 DIAGNOSIS — R06.02 SHORTNESS OF BREATH: ICD-10-CM

## 2020-01-01 DIAGNOSIS — S81.802D UNSPECIFIED OPEN WOUND, LEFT LOWER LEG, SUBSEQUENT ENCOUNTER: ICD-10-CM

## 2020-01-01 DIAGNOSIS — N17.0 ACUTE KIDNEY FAILURE WITH TUBULAR NECROSIS: ICD-10-CM

## 2020-01-01 DIAGNOSIS — T38.0X5A ADVERSE EFFECT OF GLUCOCORTICOIDS AND SYNTHETIC ANALOGUES, INITIAL ENCOUNTER: ICD-10-CM

## 2020-01-01 DIAGNOSIS — J15.212 PNEUMONIA DUE TO METHICILLIN RESISTANT STAPHYLOCOCCUS AUREUS: ICD-10-CM

## 2020-01-01 DIAGNOSIS — R53.2 FUNCTIONAL QUADRIPLEGIA: ICD-10-CM

## 2020-01-01 DIAGNOSIS — D68.69 OTHER THROMBOPHILIA: ICD-10-CM

## 2020-01-01 DIAGNOSIS — Z86.711 PERSONAL HISTORY OF PULMONARY EMBOLISM: ICD-10-CM

## 2020-01-01 DIAGNOSIS — D62 ACUTE POSTHEMORRHAGIC ANEMIA: ICD-10-CM

## 2020-01-01 DIAGNOSIS — J84.9 INTERSTITIAL PULMONARY DISEASE, UNSPECIFIED: ICD-10-CM

## 2020-01-01 DIAGNOSIS — Z79.52 LONG TERM (CURRENT) USE OF SYSTEMIC STEROIDS: ICD-10-CM

## 2020-01-01 DIAGNOSIS — J10.08 INFLUENZA DUE TO OTHER IDENTIFIED INFLUENZA VIRUS WITH OTHER SPECIFIED PNEUMONIA: ICD-10-CM

## 2020-01-01 DIAGNOSIS — R65.21 SEVERE SEPSIS WITH SEPTIC SHOCK: ICD-10-CM

## 2020-01-01 DIAGNOSIS — I26.99 OTHER PULMONARY EMBOLISM WITHOUT ACUTE COR PULMONALE: ICD-10-CM
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
## 2020-01-01 DIAGNOSIS — L97.929 NON-PRESSURE CHRONIC ULCER OF UNSPECIFIED PART OF LEFT LOWER LEG WITH UNSPECIFIED SEVERITY: ICD-10-CM

## 2020-01-01 DIAGNOSIS — D68.59 OTHER PRIMARY THROMBOPHILIA: ICD-10-CM

## 2020-01-01 DIAGNOSIS — E46 UNSPECIFIED PROTEIN-CALORIE MALNUTRITION: ICD-10-CM

## 2020-01-01 DIAGNOSIS — I10 ESSENTIAL (PRIMARY) HYPERTENSION: ICD-10-CM

## 2020-01-01 DIAGNOSIS — J44.0 CHRONIC OBSTRUCTIVE PULMONARY DISEASE WITH (ACUTE) LOWER RESPIRATORY INFECTION: ICD-10-CM

## 2020-01-01 DIAGNOSIS — R06.00 DYSPNEA, UNSPECIFIED: ICD-10-CM

## 2020-01-01 DIAGNOSIS — A41.9 SEPSIS, UNSPECIFIED ORGANISM: ICD-10-CM

## 2020-01-01 DIAGNOSIS — D72.829 ELEVATED WHITE BLOOD CELL COUNT, UNSPECIFIED: ICD-10-CM

## 2020-01-01 DIAGNOSIS — E87.3 ALKALOSIS: ICD-10-CM

## 2020-01-01 DIAGNOSIS — B37.89 OTHER SITES OF CANDIDIASIS: ICD-10-CM

## 2020-01-01 DIAGNOSIS — Z99.11 DEPENDENCE ON RESPIRATOR [VENTILATOR] STATUS: ICD-10-CM

## 2020-01-01 DIAGNOSIS — K62.5 HEMORRHAGE OF ANUS AND RECTUM: ICD-10-CM

## 2020-01-01 DIAGNOSIS — D72.828 OTHER ELEVATED WHITE BLOOD CELL COUNT: ICD-10-CM

## 2020-01-01 DIAGNOSIS — Z86.718 PERSONAL HISTORY OF OTHER VENOUS THROMBOSIS AND EMBOLISM: ICD-10-CM

## 2020-01-01 DIAGNOSIS — J96.01 ACUTE RESPIRATORY FAILURE WITH HYPOXIA: ICD-10-CM

## 2020-01-01 DIAGNOSIS — M25.512 PAIN IN LEFT SHOULDER: ICD-10-CM

## 2020-01-01 DIAGNOSIS — J45.909 UNSPECIFIED ASTHMA, UNCOMPLICATED: ICD-10-CM

## 2020-01-01 DIAGNOSIS — R09.89 OTHER SPECIFIED SYMPTOMS AND SIGNS INVOLVING THE CIRCULATORY AND RESPIRATORY SYSTEMS: ICD-10-CM

## 2020-01-01 LAB
% ALBUMIN: 43.9 % — SIGNIFICANT CHANGE UP
% ALPHA 1: 9.7 % — SIGNIFICANT CHANGE UP
% ALPHA 2: 26 % — SIGNIFICANT CHANGE UP
% BETA: 10.8 % — SIGNIFICANT CHANGE UP
% GAMMA: 9.6 % — SIGNIFICANT CHANGE UP
-  AMIKACIN: SIGNIFICANT CHANGE UP
-  AMOXICILLIN/CLAVULANIC ACID: SIGNIFICANT CHANGE UP
-  AMPICILLIN/SULBACTAM: SIGNIFICANT CHANGE UP
-  AMPICILLIN/SULBACTAM: SIGNIFICANT CHANGE UP
-  AMPICILLIN: SIGNIFICANT CHANGE UP
-  AZTREONAM: SIGNIFICANT CHANGE UP
-  CANDIDA ALBICANS: SIGNIFICANT CHANGE UP
-  CEFAZOLIN: SIGNIFICANT CHANGE UP
-  CEFAZOLIN: SIGNIFICANT CHANGE UP
-  CEFEPIME: SIGNIFICANT CHANGE UP
-  CEFOXITIN: SIGNIFICANT CHANGE UP
-  CEFTRIAXONE: SIGNIFICANT CHANGE UP
-  CIPROFLOXACIN: SIGNIFICANT CHANGE UP
-  CLINDAMYCIN: SIGNIFICANT CHANGE UP
-  ERTAPENEM: SIGNIFICANT CHANGE UP
-  ERYTHROMYCIN: SIGNIFICANT CHANGE UP
-  FLUCONAZOLE: SIGNIFICANT CHANGE UP
-  GENTAMICIN: SIGNIFICANT CHANGE UP
-  GENTAMICIN: SIGNIFICANT CHANGE UP
-  IMIPENEM: SIGNIFICANT CHANGE UP
-  INTERPRETATION: SIGNIFICANT CHANGE UP
-  LEVOFLOXACIN: SIGNIFICANT CHANGE UP
-  LINEZOLID: SIGNIFICANT CHANGE UP
-  MEROPENEM: SIGNIFICANT CHANGE UP
-  OXACILLIN: SIGNIFICANT CHANGE UP
-  PENICILLIN: SIGNIFICANT CHANGE UP
-  PIPERACILLIN/TAZOBACTAM: SIGNIFICANT CHANGE UP
-  RIFAMPIN: SIGNIFICANT CHANGE UP
-  TETRACYCLINE: SIGNIFICANT CHANGE UP
-  TOBRAMYCIN: SIGNIFICANT CHANGE UP
-  TRIMETHOPRIM/SULFAMETHOXAZOLE: SIGNIFICANT CHANGE UP
-  TRIMETHOPRIM/SULFAMETHOXAZOLE: SIGNIFICANT CHANGE UP
-  VANCOMYCIN: SIGNIFICANT CHANGE UP
ALBUMIN SERPL ELPH-MCNC: 1.4 G/DL — LOW (ref 3.5–5.2)
ALBUMIN SERPL ELPH-MCNC: 1.4 G/DL — LOW (ref 3.5–5.2)
ALBUMIN SERPL ELPH-MCNC: 1.5 G/DL — LOW (ref 3.5–5.2)
ALBUMIN SERPL ELPH-MCNC: 1.5 G/DL — LOW (ref 3.5–5.2)
ALBUMIN SERPL ELPH-MCNC: 1.7 G/DL — LOW (ref 3.5–5.2)
ALBUMIN SERPL ELPH-MCNC: 1.8 G/DL — LOW (ref 3.5–5.2)
ALBUMIN SERPL ELPH-MCNC: 1.9 G/DL — LOW (ref 3.5–5.2)
ALBUMIN SERPL ELPH-MCNC: 1.9 G/DL — LOW (ref 3.5–5.2)
ALBUMIN SERPL ELPH-MCNC: 2 G/DL — LOW (ref 3.5–5.2)
ALBUMIN SERPL ELPH-MCNC: 2.1 G/DL — LOW (ref 3.5–5.2)
ALBUMIN SERPL ELPH-MCNC: 2.1 G/DL — LOW (ref 3.5–5.2)
ALBUMIN SERPL ELPH-MCNC: 2.2 G/DL — LOW (ref 3.5–5.2)
ALBUMIN SERPL ELPH-MCNC: 2.3 G/DL — LOW (ref 3.5–5.2)
ALBUMIN SERPL ELPH-MCNC: 2.4 G/DL — LOW (ref 3.5–5.2)
ALBUMIN SERPL ELPH-MCNC: 2.4 G/DL — LOW (ref 3.5–5.2)
ALBUMIN SERPL ELPH-MCNC: 2.5 G/DL — LOW (ref 3.5–5.2)
ALBUMIN SERPL ELPH-MCNC: 2.6 G/DL — LOW (ref 3.5–5.2)
ALBUMIN SERPL ELPH-MCNC: 2.6 G/DL — LOW (ref 3.5–5.2)
ALBUMIN SERPL ELPH-MCNC: 2.7 G/DL — LOW (ref 3.5–5.2)
ALBUMIN SERPL ELPH-MCNC: 2.8 G/DL — LOW (ref 3.5–5.2)
ALBUMIN SERPL ELPH-MCNC: 2.9 G/DL — LOW (ref 3.5–5.2)
ALBUMIN SERPL ELPH-MCNC: 2.9 G/DL — LOW (ref 3.5–5.2)
ALBUMIN SERPL ELPH-MCNC: 3 G/DL — LOW (ref 3.5–5.2)
ALBUMIN SERPL ELPH-MCNC: 3.1 G/DL — LOW (ref 3.5–5.2)
ALBUMIN SERPL ELPH-MCNC: 3.1 G/DL — LOW (ref 3.6–5.5)
ALBUMIN SERPL ELPH-MCNC: 3.2 G/DL — LOW (ref 3.5–5.2)
ALBUMIN SERPL ELPH-MCNC: 3.3 G/DL — LOW (ref 3.5–5.2)
ALBUMIN SERPL ELPH-MCNC: 3.4 G/DL — LOW (ref 3.5–5.2)
ALBUMIN SERPL ELPH-MCNC: 3.5 G/DL — SIGNIFICANT CHANGE UP (ref 3.5–5.2)
ALBUMIN SERPL ELPH-MCNC: 3.5 G/DL — SIGNIFICANT CHANGE UP (ref 3.5–5.2)
ALBUMIN SERPL ELPH-MCNC: 3.7 G/DL — SIGNIFICANT CHANGE UP (ref 3.5–5.2)
ALBUMIN SERPL ELPH-MCNC: 3.8 G/DL — SIGNIFICANT CHANGE UP (ref 3.5–5.2)
ALBUMIN/GLOB SERPL ELPH: 0.8 RATIO — SIGNIFICANT CHANGE UP
ALP SERPL-CCNC: 147 U/L — HIGH (ref 30–115)
ALP SERPL-CCNC: 156 U/L — HIGH (ref 30–115)
ALP SERPL-CCNC: 156 U/L — HIGH (ref 30–115)
ALP SERPL-CCNC: 157 U/L — HIGH (ref 30–115)
ALP SERPL-CCNC: 157 U/L — HIGH (ref 30–115)
ALP SERPL-CCNC: 168 U/L — HIGH (ref 30–115)
ALP SERPL-CCNC: 169 U/L — HIGH (ref 30–115)
ALP SERPL-CCNC: 170 U/L — HIGH (ref 30–115)
ALP SERPL-CCNC: 173 U/L — HIGH (ref 30–115)
ALP SERPL-CCNC: 176 U/L — HIGH (ref 30–115)
ALP SERPL-CCNC: 176 U/L — HIGH (ref 30–115)
ALP SERPL-CCNC: 177 U/L — HIGH (ref 30–115)
ALP SERPL-CCNC: 181 U/L — HIGH (ref 30–115)
ALP SERPL-CCNC: 183 U/L — HIGH (ref 30–115)
ALP SERPL-CCNC: 183 U/L — HIGH (ref 30–115)
ALP SERPL-CCNC: 184 U/L — HIGH (ref 30–115)
ALP SERPL-CCNC: 196 U/L — HIGH (ref 30–115)
ALP SERPL-CCNC: 200 U/L — HIGH (ref 30–115)
ALP SERPL-CCNC: 202 U/L — HIGH (ref 30–115)
ALP SERPL-CCNC: 203 U/L — HIGH (ref 30–115)
ALP SERPL-CCNC: 206 U/L — HIGH (ref 30–115)
ALP SERPL-CCNC: 213 U/L — HIGH (ref 30–115)
ALP SERPL-CCNC: 220 U/L — HIGH (ref 30–115)
ALP SERPL-CCNC: 227 U/L — HIGH (ref 30–115)
ALP SERPL-CCNC: 229 U/L — HIGH (ref 30–115)
ALP SERPL-CCNC: 229 U/L — HIGH (ref 30–115)
ALP SERPL-CCNC: 235 U/L — HIGH (ref 30–115)
ALP SERPL-CCNC: 240 U/L — HIGH (ref 30–115)
ALP SERPL-CCNC: 252 U/L — HIGH (ref 30–115)
ALP SERPL-CCNC: 266 U/L — HIGH (ref 30–115)
ALP SERPL-CCNC: 277 U/L — HIGH (ref 30–115)
ALP SERPL-CCNC: 281 U/L — HIGH (ref 30–115)
ALP SERPL-CCNC: 287 U/L — HIGH (ref 30–115)
ALP SERPL-CCNC: 310 U/L — HIGH (ref 30–115)
ALP SERPL-CCNC: 321 U/L — HIGH (ref 30–115)
ALP SERPL-CCNC: 324 U/L — HIGH (ref 30–115)
ALP SERPL-CCNC: 328 U/L — HIGH (ref 30–115)
ALP SERPL-CCNC: 363 U/L — HIGH (ref 30–115)
ALP SERPL-CCNC: 364 U/L — HIGH (ref 30–115)
ALP SERPL-CCNC: 383 U/L — HIGH (ref 30–115)
ALP SERPL-CCNC: 426 U/L — HIGH (ref 30–115)
ALP SERPL-CCNC: 451 U/L — HIGH (ref 30–115)
ALPHA1 GLOB SERPL ELPH-MCNC: 0.7 G/DL — HIGH (ref 0.1–0.4)
ALPHA2 GLOB SERPL ELPH-MCNC: 1.8 G/DL — HIGH (ref 0.5–1)
ALT FLD-CCNC: 108 U/L — HIGH (ref 0–41)
ALT FLD-CCNC: 17 U/L — SIGNIFICANT CHANGE UP (ref 0–41)
ALT FLD-CCNC: 18 U/L — SIGNIFICANT CHANGE UP (ref 0–41)
ALT FLD-CCNC: 18 U/L — SIGNIFICANT CHANGE UP (ref 0–41)
ALT FLD-CCNC: 19 U/L — SIGNIFICANT CHANGE UP (ref 0–41)
ALT FLD-CCNC: 19 U/L — SIGNIFICANT CHANGE UP (ref 0–41)
ALT FLD-CCNC: 20 U/L — SIGNIFICANT CHANGE UP (ref 0–41)
ALT FLD-CCNC: 21 U/L — SIGNIFICANT CHANGE UP (ref 0–41)
ALT FLD-CCNC: 22 U/L — SIGNIFICANT CHANGE UP (ref 0–41)
ALT FLD-CCNC: 22 U/L — SIGNIFICANT CHANGE UP (ref 0–41)
ALT FLD-CCNC: 24 U/L — SIGNIFICANT CHANGE UP (ref 0–41)
ALT FLD-CCNC: 24 U/L — SIGNIFICANT CHANGE UP (ref 0–41)
ALT FLD-CCNC: 29 U/L — SIGNIFICANT CHANGE UP (ref 0–41)
ALT FLD-CCNC: 33 U/L — SIGNIFICANT CHANGE UP (ref 0–41)
ALT FLD-CCNC: 38 U/L — SIGNIFICANT CHANGE UP (ref 0–41)
ALT FLD-CCNC: 39 U/L — SIGNIFICANT CHANGE UP (ref 0–41)
ALT FLD-CCNC: 41 U/L — SIGNIFICANT CHANGE UP (ref 0–41)
ALT FLD-CCNC: 41 U/L — SIGNIFICANT CHANGE UP (ref 0–41)
ALT FLD-CCNC: 44 U/L — HIGH (ref 0–41)
ALT FLD-CCNC: 45 U/L — HIGH (ref 0–41)
ALT FLD-CCNC: 46 U/L — HIGH (ref 0–41)
ALT FLD-CCNC: 48 U/L — HIGH (ref 0–41)
ALT FLD-CCNC: 51 U/L — HIGH (ref 0–41)
ALT FLD-CCNC: 52 U/L — HIGH (ref 0–41)
ALT FLD-CCNC: 52 U/L — HIGH (ref 0–41)
ALT FLD-CCNC: 55 U/L — HIGH (ref 0–41)
ALT FLD-CCNC: 56 U/L — HIGH (ref 0–41)
ALT FLD-CCNC: 60 U/L — HIGH (ref 0–41)
ALT FLD-CCNC: 61 U/L — HIGH (ref 0–41)
ALT FLD-CCNC: 62 U/L — HIGH (ref 0–41)
ALT FLD-CCNC: 62 U/L — HIGH (ref 0–41)
ALT FLD-CCNC: 66 U/L — HIGH (ref 0–41)
ALT FLD-CCNC: 67 U/L — HIGH (ref 0–41)
ALT FLD-CCNC: 69 U/L — HIGH (ref 0–41)
ALT FLD-CCNC: 69 U/L — HIGH (ref 0–41)
ALT FLD-CCNC: 74 U/L — HIGH (ref 0–41)
ALT FLD-CCNC: 80 U/L — HIGH (ref 0–41)
ALT FLD-CCNC: 82 U/L — HIGH (ref 0–41)
ANA TITR SER: NEGATIVE — SIGNIFICANT CHANGE UP
ANION GAP SERPL CALC-SCNC: 10 MMOL/L — SIGNIFICANT CHANGE UP (ref 7–14)
ANION GAP SERPL CALC-SCNC: 11 MMOL/L — SIGNIFICANT CHANGE UP (ref 7–14)
ANION GAP SERPL CALC-SCNC: 12 MMOL/L — SIGNIFICANT CHANGE UP (ref 7–14)
ANION GAP SERPL CALC-SCNC: 13 MMOL/L — SIGNIFICANT CHANGE UP (ref 7–14)
ANION GAP SERPL CALC-SCNC: 14 MMOL/L — SIGNIFICANT CHANGE UP (ref 7–14)
ANION GAP SERPL CALC-SCNC: 15 MMOL/L — HIGH (ref 7–14)
ANION GAP SERPL CALC-SCNC: 16 MMOL/L — HIGH (ref 7–14)
ANION GAP SERPL CALC-SCNC: 17 MMOL/L — HIGH (ref 7–14)
ANION GAP SERPL CALC-SCNC: 18 MMOL/L — HIGH (ref 7–14)
ANION GAP SERPL CALC-SCNC: 19 MMOL/L — HIGH (ref 7–14)
ANION GAP SERPL CALC-SCNC: 19 MMOL/L — HIGH (ref 7–14)
ANION GAP SERPL CALC-SCNC: 20 MMOL/L — HIGH (ref 7–14)
ANION GAP SERPL CALC-SCNC: 22 MMOL/L — HIGH (ref 7–14)
ANION GAP SERPL CALC-SCNC: 23 MMOL/L — HIGH (ref 7–14)
ANION GAP SERPL CALC-SCNC: 23 MMOL/L — HIGH (ref 7–14)
ANION GAP SERPL CALC-SCNC: 6 MMOL/L — LOW (ref 7–14)
ANION GAP SERPL CALC-SCNC: 7 MMOL/L — SIGNIFICANT CHANGE UP (ref 7–14)
ANION GAP SERPL CALC-SCNC: 7 MMOL/L — SIGNIFICANT CHANGE UP (ref 7–14)
ANION GAP SERPL CALC-SCNC: 8 MMOL/L — SIGNIFICANT CHANGE UP (ref 7–14)
ANION GAP SERPL CALC-SCNC: 8 MMOL/L — SIGNIFICANT CHANGE UP (ref 7–14)
ANION GAP SERPL CALC-SCNC: 9 MMOL/L — SIGNIFICANT CHANGE UP (ref 7–14)
ANISOCYTOSIS BLD QL: SIGNIFICANT CHANGE UP
ANISOCYTOSIS BLD QL: SLIGHT — SIGNIFICANT CHANGE UP
APPEARANCE UR: ABNORMAL
APPEARANCE UR: ABNORMAL
APTT BLD: 20.8 SEC — CRITICAL LOW (ref 27–39.2)
APTT BLD: 23.2 SEC — CRITICAL LOW (ref 27–39.2)
APTT BLD: 23.4 SEC — CRITICAL LOW (ref 27–39.2)
APTT BLD: 24.5 SEC — LOW (ref 27–39.2)
APTT BLD: 24.8 SEC — LOW (ref 27–39.2)
APTT BLD: 26.6 SEC — LOW (ref 27–39.2)
APTT BLD: 27.7 SEC — SIGNIFICANT CHANGE UP (ref 27–39.2)
APTT BLD: 28.4 SEC — SIGNIFICANT CHANGE UP (ref 27–39.2)
APTT BLD: 28.9 SEC — SIGNIFICANT CHANGE UP (ref 27–39.2)
APTT BLD: 29.2 SEC — SIGNIFICANT CHANGE UP (ref 27–39.2)
APTT BLD: 29.3 SEC — SIGNIFICANT CHANGE UP (ref 27–39.2)
APTT BLD: 30.1 SEC — SIGNIFICANT CHANGE UP (ref 27–39.2)
APTT BLD: 36.7 SEC — SIGNIFICANT CHANGE UP (ref 27–39.2)
AST SERPL-CCNC: 12 U/L — SIGNIFICANT CHANGE UP (ref 0–41)
AST SERPL-CCNC: 14 U/L — SIGNIFICANT CHANGE UP (ref 0–41)
AST SERPL-CCNC: 141 U/L — HIGH (ref 0–41)
AST SERPL-CCNC: 15 U/L — SIGNIFICANT CHANGE UP (ref 0–41)
AST SERPL-CCNC: 15 U/L — SIGNIFICANT CHANGE UP (ref 0–41)
AST SERPL-CCNC: 16 U/L — SIGNIFICANT CHANGE UP (ref 0–41)
AST SERPL-CCNC: 17 U/L — SIGNIFICANT CHANGE UP (ref 0–41)
AST SERPL-CCNC: 17 U/L — SIGNIFICANT CHANGE UP (ref 0–41)
AST SERPL-CCNC: 18 U/L — SIGNIFICANT CHANGE UP (ref 0–41)
AST SERPL-CCNC: 19 U/L — SIGNIFICANT CHANGE UP (ref 0–41)
AST SERPL-CCNC: 20 U/L — SIGNIFICANT CHANGE UP (ref 0–41)
AST SERPL-CCNC: 21 U/L — SIGNIFICANT CHANGE UP (ref 0–41)
AST SERPL-CCNC: 22 U/L — SIGNIFICANT CHANGE UP (ref 0–41)
AST SERPL-CCNC: 23 U/L — SIGNIFICANT CHANGE UP (ref 0–41)
AST SERPL-CCNC: 24 U/L — SIGNIFICANT CHANGE UP (ref 0–41)
AST SERPL-CCNC: 26 U/L — SIGNIFICANT CHANGE UP (ref 0–41)
AST SERPL-CCNC: 27 U/L — SIGNIFICANT CHANGE UP (ref 0–41)
AST SERPL-CCNC: 27 U/L — SIGNIFICANT CHANGE UP (ref 0–41)
AST SERPL-CCNC: 29 U/L — SIGNIFICANT CHANGE UP (ref 0–41)
AST SERPL-CCNC: 30 U/L — SIGNIFICANT CHANGE UP (ref 0–41)
AST SERPL-CCNC: 30 U/L — SIGNIFICANT CHANGE UP (ref 0–41)
AST SERPL-CCNC: 33 U/L — SIGNIFICANT CHANGE UP (ref 0–41)
AST SERPL-CCNC: 33 U/L — SIGNIFICANT CHANGE UP (ref 0–41)
AST SERPL-CCNC: 34 U/L — SIGNIFICANT CHANGE UP (ref 0–41)
AST SERPL-CCNC: 34 U/L — SIGNIFICANT CHANGE UP (ref 0–41)
AST SERPL-CCNC: 40 U/L — SIGNIFICANT CHANGE UP (ref 0–41)
AST SERPL-CCNC: 43 U/L — HIGH (ref 0–41)
AST SERPL-CCNC: 46 U/L — HIGH (ref 0–41)
AST SERPL-CCNC: 47 U/L — HIGH (ref 0–41)
AST SERPL-CCNC: 49 U/L — HIGH (ref 0–41)
AST SERPL-CCNC: 54 U/L — HIGH (ref 0–41)
AST SERPL-CCNC: 57 U/L — HIGH (ref 0–41)
AST SERPL-CCNC: 62 U/L — HIGH (ref 0–41)
AST SERPL-CCNC: 69 U/L — HIGH (ref 0–41)
AST SERPL-CCNC: 71 U/L — HIGH (ref 0–41)
AST SERPL-CCNC: 83 U/L — HIGH (ref 0–41)
AST SERPL-CCNC: 86 U/L — HIGH (ref 0–41)
AUTO DIFF PNL BLD: NEGATIVE — SIGNIFICANT CHANGE UP
AUTO DIFF PNL BLD: NEGATIVE — SIGNIFICANT CHANGE UP
B PERT IGG+IGM PNL SER: ABNORMAL
B-GLOBULIN SERPL ELPH-MCNC: 0.8 G/DL — SIGNIFICANT CHANGE UP (ref 0.5–1)
B2 GLYCOPROT1 AB SER QL: NEGATIVE — SIGNIFICANT CHANGE UP
BACTERIA # UR AUTO: NEGATIVE — SIGNIFICANT CHANGE UP
BACTERIA # UR AUTO: NEGATIVE — SIGNIFICANT CHANGE UP
BASE EXCESS BLDA CALC-SCNC: -0.5 MMOL/L — SIGNIFICANT CHANGE UP (ref -2–2)
BASE EXCESS BLDA CALC-SCNC: -1.4 MMOL/L — SIGNIFICANT CHANGE UP (ref -2–2)
BASE EXCESS BLDA CALC-SCNC: -1.5 MMOL/L — SIGNIFICANT CHANGE UP (ref -2–2)
BASE EXCESS BLDA CALC-SCNC: -1.8 MMOL/L — SIGNIFICANT CHANGE UP (ref -2–2)
BASE EXCESS BLDA CALC-SCNC: -10.9 MMOL/L — LOW (ref -2–2)
BASE EXCESS BLDA CALC-SCNC: -3.6 MMOL/L — LOW (ref -2–2)
BASE EXCESS BLDA CALC-SCNC: -3.9 MMOL/L — LOW (ref -2–2)
BASE EXCESS BLDA CALC-SCNC: -4.4 MMOL/L — LOW (ref -2–2)
BASE EXCESS BLDA CALC-SCNC: -6.3 MMOL/L — LOW (ref -2–2)
BASE EXCESS BLDA CALC-SCNC: -6.7 MMOL/L — LOW (ref -2–2)
BASE EXCESS BLDA CALC-SCNC: -6.7 MMOL/L — LOW (ref -2–2)
BASE EXCESS BLDA CALC-SCNC: -7 MMOL/L — LOW (ref -2–2)
BASE EXCESS BLDA CALC-SCNC: 0.3 MMOL/L — SIGNIFICANT CHANGE UP (ref -2–2)
BASE EXCESS BLDA CALC-SCNC: 0.5 MMOL/L — SIGNIFICANT CHANGE UP (ref -2–2)
BASE EXCESS BLDA CALC-SCNC: 0.5 MMOL/L — SIGNIFICANT CHANGE UP (ref -2–2)
BASE EXCESS BLDA CALC-SCNC: 0.6 MMOL/L — SIGNIFICANT CHANGE UP (ref -2–2)
BASE EXCESS BLDA CALC-SCNC: 0.7 MMOL/L — SIGNIFICANT CHANGE UP (ref -2–2)
BASE EXCESS BLDA CALC-SCNC: 1.2 MMOL/L — SIGNIFICANT CHANGE UP (ref -2–2)
BASE EXCESS BLDA CALC-SCNC: 1.4 MMOL/L — SIGNIFICANT CHANGE UP (ref -2–2)
BASE EXCESS BLDA CALC-SCNC: 1.4 MMOL/L — SIGNIFICANT CHANGE UP (ref -2–2)
BASE EXCESS BLDA CALC-SCNC: 1.6 MMOL/L — SIGNIFICANT CHANGE UP (ref -2–2)
BASE EXCESS BLDA CALC-SCNC: 1.9 MMOL/L — SIGNIFICANT CHANGE UP (ref -2–2)
BASE EXCESS BLDA CALC-SCNC: 2.3 MMOL/L — HIGH (ref -2–2)
BASE EXCESS BLDA CALC-SCNC: 2.5 MMOL/L — HIGH (ref -2–2)
BASE EXCESS BLDA CALC-SCNC: 2.6 MMOL/L — HIGH (ref -2–2)
BASE EXCESS BLDA CALC-SCNC: 2.6 MMOL/L — HIGH (ref -2–2)
BASE EXCESS BLDA CALC-SCNC: 2.9 MMOL/L — HIGH (ref -2–2)
BASE EXCESS BLDA CALC-SCNC: 2.9 MMOL/L — HIGH (ref -2–2)
BASE EXCESS BLDA CALC-SCNC: 3.1 MMOL/L — HIGH (ref -2–2)
BASE EXCESS BLDA CALC-SCNC: 3.3 MMOL/L — HIGH (ref -2–2)
BASE EXCESS BLDA CALC-SCNC: 3.3 MMOL/L — HIGH (ref -2–2)
BASE EXCESS BLDA CALC-SCNC: 3.5 MMOL/L — HIGH (ref -2–2)
BASE EXCESS BLDA CALC-SCNC: 3.5 MMOL/L — HIGH (ref -2–2)
BASE EXCESS BLDA CALC-SCNC: 4 MMOL/L — HIGH (ref -2–2)
BASE EXCESS BLDA CALC-SCNC: 4.2 MMOL/L — HIGH (ref -2–2)
BASE EXCESS BLDA CALC-SCNC: 4.6 MMOL/L — HIGH (ref -2–2)
BASE EXCESS BLDV CALC-SCNC: 1.7 MMOL/L — SIGNIFICANT CHANGE UP (ref -2–2)
BASOPHILS # BLD AUTO: 0 K/UL — SIGNIFICANT CHANGE UP (ref 0–0.2)
BASOPHILS # BLD AUTO: 0.01 K/UL — SIGNIFICANT CHANGE UP (ref 0–0.2)
BASOPHILS # BLD AUTO: 0.02 K/UL — SIGNIFICANT CHANGE UP (ref 0–0.2)
BASOPHILS # BLD AUTO: 0.03 K/UL — SIGNIFICANT CHANGE UP (ref 0–0.2)
BASOPHILS # BLD AUTO: 0.04 K/UL — SIGNIFICANT CHANGE UP (ref 0–0.2)
BASOPHILS # BLD AUTO: 0.05 K/UL — SIGNIFICANT CHANGE UP (ref 0–0.2)
BASOPHILS # BLD AUTO: 0.06 K/UL — SIGNIFICANT CHANGE UP (ref 0–0.2)
BASOPHILS # BLD AUTO: 0.06 K/UL — SIGNIFICANT CHANGE UP (ref 0–0.2)
BASOPHILS NFR BLD AUTO: 0 % — SIGNIFICANT CHANGE UP (ref 0–1)
BASOPHILS NFR BLD AUTO: 0.1 % — SIGNIFICANT CHANGE UP (ref 0–1)
BASOPHILS NFR BLD AUTO: 0.2 % — SIGNIFICANT CHANGE UP (ref 0–1)
BASOPHILS NFR BLD AUTO: 0.3 % — SIGNIFICANT CHANGE UP (ref 0–1)
BASOPHILS NFR BLD AUTO: 0.4 % — SIGNIFICANT CHANGE UP (ref 0–1)
BASOPHILS NFR BLD AUTO: 0.5 % — SIGNIFICANT CHANGE UP (ref 0–1)
BASOPHILS NFR BLD AUTO: 0.5 % — SIGNIFICANT CHANGE UP (ref 0–1)
BASOPHILS NFR BLD AUTO: 0.6 % — SIGNIFICANT CHANGE UP (ref 0–1)
BASOPHILS NFR BLD AUTO: 0.8 % — SIGNIFICANT CHANGE UP (ref 0–1)
BILIRUB DIRECT SERPL-MCNC: 0.3 MG/DL — HIGH (ref 0–0.2)
BILIRUB DIRECT SERPL-MCNC: 0.4 MG/DL — HIGH (ref 0–0.2)
BILIRUB DIRECT SERPL-MCNC: 0.5 MG/DL — HIGH (ref 0–0.2)
BILIRUB INDIRECT FLD-MCNC: 0.2 MG/DL — SIGNIFICANT CHANGE UP (ref 0.2–1.2)
BILIRUB INDIRECT FLD-MCNC: 0.3 MG/DL — SIGNIFICANT CHANGE UP (ref 0.2–1.2)
BILIRUB INDIRECT FLD-MCNC: 0.5 MG/DL — SIGNIFICANT CHANGE UP (ref 0.2–1.2)
BILIRUB SERPL-MCNC: 0.3 MG/DL — SIGNIFICANT CHANGE UP (ref 0.2–1.2)
BILIRUB SERPL-MCNC: 0.4 MG/DL — SIGNIFICANT CHANGE UP (ref 0.2–1.2)
BILIRUB SERPL-MCNC: 0.5 MG/DL — SIGNIFICANT CHANGE UP (ref 0.2–1.2)
BILIRUB SERPL-MCNC: 0.6 MG/DL — SIGNIFICANT CHANGE UP (ref 0.2–1.2)
BILIRUB SERPL-MCNC: 0.7 MG/DL — SIGNIFICANT CHANGE UP (ref 0.2–1.2)
BILIRUB SERPL-MCNC: 0.8 MG/DL — SIGNIFICANT CHANGE UP (ref 0.2–1.2)
BILIRUB SERPL-MCNC: 0.9 MG/DL — SIGNIFICANT CHANGE UP (ref 0.2–1.2)
BILIRUB SERPL-MCNC: 0.9 MG/DL — SIGNIFICANT CHANGE UP (ref 0.2–1.2)
BILIRUB SERPL-MCNC: 1 MG/DL — SIGNIFICANT CHANGE UP (ref 0.2–1.2)
BILIRUB SERPL-MCNC: 1.1 MG/DL — SIGNIFICANT CHANGE UP (ref 0.2–1.2)
BILIRUB SERPL-MCNC: 1.1 MG/DL — SIGNIFICANT CHANGE UP (ref 0.2–1.2)
BILIRUB SERPL-MCNC: 1.2 MG/DL — SIGNIFICANT CHANGE UP (ref 0.2–1.2)
BILIRUB SERPL-MCNC: 1.3 MG/DL — HIGH (ref 0.2–1.2)
BILIRUB SERPL-MCNC: 1.3 MG/DL — HIGH (ref 0.2–1.2)
BILIRUB SERPL-MCNC: 1.6 MG/DL — HIGH (ref 0.2–1.2)
BILIRUB SERPL-MCNC: 1.7 MG/DL — HIGH (ref 0.2–1.2)
BILIRUB UR-MCNC: NEGATIVE — SIGNIFICANT CHANGE UP
BILIRUB UR-MCNC: NEGATIVE — SIGNIFICANT CHANGE UP
BLD GP AB SCN SERPL QL: SIGNIFICANT CHANGE UP
BUN SERPL-MCNC: 102 MG/DL — CRITICAL HIGH (ref 10–20)
BUN SERPL-MCNC: 111 MG/DL — CRITICAL HIGH (ref 10–20)
BUN SERPL-MCNC: 121 MG/DL — CRITICAL HIGH (ref 10–20)
BUN SERPL-MCNC: 127 MG/DL — CRITICAL HIGH (ref 10–20)
BUN SERPL-MCNC: 133 MG/DL — CRITICAL HIGH (ref 10–20)
BUN SERPL-MCNC: 134 MG/DL — CRITICAL HIGH (ref 10–20)
BUN SERPL-MCNC: 146 MG/DL — CRITICAL HIGH (ref 10–20)
BUN SERPL-MCNC: 147 MG/DL — CRITICAL HIGH (ref 10–20)
BUN SERPL-MCNC: 158 MG/DL — CRITICAL HIGH (ref 10–20)
BUN SERPL-MCNC: 16 MG/DL — SIGNIFICANT CHANGE UP (ref 10–20)
BUN SERPL-MCNC: 16 MG/DL — SIGNIFICANT CHANGE UP (ref 10–20)
BUN SERPL-MCNC: 162 MG/DL — CRITICAL HIGH (ref 10–20)
BUN SERPL-MCNC: 166 MG/DL — CRITICAL HIGH (ref 10–20)
BUN SERPL-MCNC: 184 MG/DL — CRITICAL HIGH (ref 10–20)
BUN SERPL-MCNC: 186 MG/DL — CRITICAL HIGH (ref 10–20)
BUN SERPL-MCNC: 19 MG/DL — SIGNIFICANT CHANGE UP (ref 10–20)
BUN SERPL-MCNC: 190 MG/DL — CRITICAL HIGH (ref 10–20)
BUN SERPL-MCNC: 20 MG/DL — SIGNIFICANT CHANGE UP (ref 10–20)
BUN SERPL-MCNC: 20 MG/DL — SIGNIFICANT CHANGE UP (ref 10–20)
BUN SERPL-MCNC: 21 MG/DL — HIGH (ref 10–20)
BUN SERPL-MCNC: 22 MG/DL — HIGH (ref 10–20)
BUN SERPL-MCNC: 27 MG/DL — HIGH (ref 10–20)
BUN SERPL-MCNC: 33 MG/DL — HIGH (ref 10–20)
BUN SERPL-MCNC: 34 MG/DL — HIGH (ref 10–20)
BUN SERPL-MCNC: 36 MG/DL — HIGH (ref 10–20)
BUN SERPL-MCNC: 37 MG/DL — HIGH (ref 10–20)
BUN SERPL-MCNC: 38 MG/DL — HIGH (ref 10–20)
BUN SERPL-MCNC: 39 MG/DL — HIGH (ref 10–20)
BUN SERPL-MCNC: 39 MG/DL — HIGH (ref 10–20)
BUN SERPL-MCNC: 40 MG/DL — HIGH (ref 10–20)
BUN SERPL-MCNC: 41 MG/DL — HIGH (ref 10–20)
BUN SERPL-MCNC: 45 MG/DL — HIGH (ref 10–20)
BUN SERPL-MCNC: 45 MG/DL — HIGH (ref 10–20)
BUN SERPL-MCNC: 46 MG/DL — HIGH (ref 10–20)
BUN SERPL-MCNC: 47 MG/DL — HIGH (ref 10–20)
BUN SERPL-MCNC: 47 MG/DL — HIGH (ref 10–20)
BUN SERPL-MCNC: 48 MG/DL — HIGH (ref 10–20)
BUN SERPL-MCNC: 48 MG/DL — HIGH (ref 10–20)
BUN SERPL-MCNC: 52 MG/DL — HIGH (ref 10–20)
BUN SERPL-MCNC: 54 MG/DL — HIGH (ref 10–20)
BUN SERPL-MCNC: 55 MG/DL — HIGH (ref 10–20)
BUN SERPL-MCNC: 57 MG/DL — HIGH (ref 10–20)
BUN SERPL-MCNC: 59 MG/DL — HIGH (ref 10–20)
BUN SERPL-MCNC: 62 MG/DL — CRITICAL HIGH (ref 10–20)
BUN SERPL-MCNC: 62 MG/DL — CRITICAL HIGH (ref 10–20)
BUN SERPL-MCNC: 64 MG/DL — CRITICAL HIGH (ref 10–20)
BUN SERPL-MCNC: 69 MG/DL — CRITICAL HIGH (ref 10–20)
BUN SERPL-MCNC: 70 MG/DL — CRITICAL HIGH (ref 10–20)
BUN SERPL-MCNC: 70 MG/DL — CRITICAL HIGH (ref 10–20)
BUN SERPL-MCNC: 71 MG/DL — CRITICAL HIGH (ref 10–20)
BUN SERPL-MCNC: 72 MG/DL — CRITICAL HIGH (ref 10–20)
BUN SERPL-MCNC: 73 MG/DL — CRITICAL HIGH (ref 10–20)
BUN SERPL-MCNC: 76 MG/DL — CRITICAL HIGH (ref 10–20)
BUN SERPL-MCNC: 84 MG/DL — CRITICAL HIGH (ref 10–20)
BUN SERPL-MCNC: 87 MG/DL — CRITICAL HIGH (ref 10–20)
BUN SERPL-MCNC: 89 MG/DL — CRITICAL HIGH (ref 10–20)
BUN SERPL-MCNC: 91 MG/DL — CRITICAL HIGH (ref 10–20)
BUN SERPL-MCNC: 95 MG/DL — CRITICAL HIGH (ref 10–20)
BUN SERPL-MCNC: 97 MG/DL — CRITICAL HIGH (ref 10–20)
BURR CELLS BLD QL SMEAR: PRESENT — SIGNIFICANT CHANGE UP
C DIFF BY PCR RESULT: NEGATIVE — SIGNIFICANT CHANGE UP
C DIFF TOX GENS STL QL NAA+PROBE: SIGNIFICANT CHANGE UP
C-ANCA SER-ACNC: NEGATIVE — SIGNIFICANT CHANGE UP
C-ANCA SER-ACNC: NEGATIVE — SIGNIFICANT CHANGE UP
CA-I SERPL-SCNC: 1.18 MMOL/L — SIGNIFICANT CHANGE UP (ref 1.12–1.3)
CALCIUM SERPL-MCNC: 6.6 MG/DL — LOW (ref 8.5–10.1)
CALCIUM SERPL-MCNC: 6.7 MG/DL — LOW (ref 8.5–10.1)
CALCIUM SERPL-MCNC: 6.7 MG/DL — LOW (ref 8.5–10.1)
CALCIUM SERPL-MCNC: 6.8 MG/DL — LOW (ref 8.5–10.1)
CALCIUM SERPL-MCNC: 6.9 MG/DL — LOW (ref 8.5–10.1)
CALCIUM SERPL-MCNC: 7 MG/DL — LOW (ref 8.5–10.1)
CALCIUM SERPL-MCNC: 7.1 MG/DL — LOW (ref 8.5–10.1)
CALCIUM SERPL-MCNC: 7.2 MG/DL — LOW (ref 8.5–10.1)
CALCIUM SERPL-MCNC: 7.3 MG/DL — LOW (ref 8.4–10.5)
CALCIUM SERPL-MCNC: 7.3 MG/DL — LOW (ref 8.5–10.1)
CALCIUM SERPL-MCNC: 7.4 MG/DL — LOW (ref 8.5–10.1)
CALCIUM SERPL-MCNC: 7.5 MG/DL — LOW (ref 8.5–10.1)
CALCIUM SERPL-MCNC: 7.6 MG/DL — LOW (ref 8.5–10.1)
CALCIUM SERPL-MCNC: 7.6 MG/DL — LOW (ref 8.5–10.1)
CALCIUM SERPL-MCNC: 7.8 MG/DL — LOW (ref 8.5–10.1)
CALCIUM SERPL-MCNC: 8.3 MG/DL — LOW (ref 8.5–10.1)
CALCIUM SERPL-MCNC: 8.6 MG/DL — SIGNIFICANT CHANGE UP (ref 8.5–10.1)
CALCIUM SERPL-MCNC: 8.6 MG/DL — SIGNIFICANT CHANGE UP (ref 8.5–10.1)
CALCIUM SERPL-MCNC: 8.7 MG/DL — SIGNIFICANT CHANGE UP (ref 8.5–10.1)
CALCIUM SERPL-MCNC: 8.9 MG/DL — SIGNIFICANT CHANGE UP (ref 8.5–10.1)
CALCIUM SERPL-MCNC: 9 MG/DL — SIGNIFICANT CHANGE UP (ref 8.5–10.1)
CALCIUM SERPL-MCNC: 9.2 MG/DL — SIGNIFICANT CHANGE UP (ref 8.5–10.1)
CALCIUM SERPL-MCNC: 9.3 MG/DL — SIGNIFICANT CHANGE UP (ref 8.5–10.1)
CARDIOLIPIN AB SER-ACNC: NEGATIVE — SIGNIFICANT CHANGE UP
CHLORIDE SERPL-SCNC: 100 MMOL/L — SIGNIFICANT CHANGE UP (ref 98–110)
CHLORIDE SERPL-SCNC: 101 MMOL/L — SIGNIFICANT CHANGE UP (ref 98–110)
CHLORIDE SERPL-SCNC: 102 MMOL/L — SIGNIFICANT CHANGE UP (ref 98–110)
CHLORIDE SERPL-SCNC: 103 MMOL/L — SIGNIFICANT CHANGE UP (ref 98–110)
CHLORIDE SERPL-SCNC: 104 MMOL/L — SIGNIFICANT CHANGE UP (ref 98–110)
CHLORIDE SERPL-SCNC: 105 MMOL/L — SIGNIFICANT CHANGE UP (ref 98–110)
CHLORIDE SERPL-SCNC: 106 MMOL/L — SIGNIFICANT CHANGE UP (ref 98–110)
CHLORIDE SERPL-SCNC: 107 MMOL/L — SIGNIFICANT CHANGE UP (ref 98–110)
CHLORIDE SERPL-SCNC: 108 MMOL/L — SIGNIFICANT CHANGE UP (ref 98–110)
CHLORIDE SERPL-SCNC: 109 MMOL/L — SIGNIFICANT CHANGE UP (ref 98–110)
CHLORIDE SERPL-SCNC: 110 MMOL/L — SIGNIFICANT CHANGE UP (ref 98–110)
CHLORIDE SERPL-SCNC: 111 MMOL/L — HIGH (ref 98–110)
CHLORIDE SERPL-SCNC: 111 MMOL/L — HIGH (ref 98–110)
CHLORIDE SERPL-SCNC: 95 MMOL/L — LOW (ref 98–110)
CHLORIDE SERPL-SCNC: 96 MMOL/L — LOW (ref 98–110)
CHLORIDE SERPL-SCNC: 97 MMOL/L — LOW (ref 98–110)
CHLORIDE SERPL-SCNC: 97 MMOL/L — LOW (ref 98–110)
CHLORIDE SERPL-SCNC: 98 MMOL/L — SIGNIFICANT CHANGE UP (ref 98–110)
CHLORIDE SERPL-SCNC: 99 MMOL/L — SIGNIFICANT CHANGE UP (ref 98–110)
CHOLEST SERPL-MCNC: 203 MG/DL — HIGH (ref 100–200)
CK MB CFR SERPL CALC: 3.7 NG/ML — SIGNIFICANT CHANGE UP (ref 0.6–6.3)
CK SERPL-CCNC: 118 U/L — SIGNIFICANT CHANGE UP (ref 0–225)
CMV DNA CSF QL NAA+PROBE: SIGNIFICANT CHANGE UP
CMV DNA SPEC NAA+PROBE-LOG#: SIGNIFICANT CHANGE UP LOG10IU/ML
CMV IGG FLD QL: >10 U/ML — HIGH
CMV IGG FLD QL: >10 U/ML — HIGH
CMV IGG SERPL-IMP: POSITIVE
CMV IGG SERPL-IMP: POSITIVE
CMV IGM FLD-ACNC: 36.7 AU/ML — HIGH
CMV IGM FLD-ACNC: 44.4 AU/ML — HIGH
CMV IGM SERPL QL: POSITIVE
CMV IGM SERPL QL: POSITIVE
CO2 SERPL-SCNC: 15 MMOL/L — LOW (ref 17–32)
CO2 SERPL-SCNC: 15 MMOL/L — LOW (ref 17–32)
CO2 SERPL-SCNC: 16 MMOL/L — LOW (ref 17–32)
CO2 SERPL-SCNC: 18 MMOL/L — SIGNIFICANT CHANGE UP (ref 17–32)
CO2 SERPL-SCNC: 19 MMOL/L — SIGNIFICANT CHANGE UP (ref 17–32)
CO2 SERPL-SCNC: 20 MMOL/L — SIGNIFICANT CHANGE UP (ref 17–32)
CO2 SERPL-SCNC: 21 MMOL/L — SIGNIFICANT CHANGE UP (ref 17–32)
CO2 SERPL-SCNC: 22 MMOL/L — SIGNIFICANT CHANGE UP (ref 17–32)
CO2 SERPL-SCNC: 23 MMOL/L — SIGNIFICANT CHANGE UP (ref 17–32)
CO2 SERPL-SCNC: 24 MMOL/L — SIGNIFICANT CHANGE UP (ref 17–32)
CO2 SERPL-SCNC: 25 MMOL/L — SIGNIFICANT CHANGE UP (ref 17–32)
CO2 SERPL-SCNC: 26 MMOL/L — SIGNIFICANT CHANGE UP (ref 17–32)
CO2 SERPL-SCNC: 27 MMOL/L — SIGNIFICANT CHANGE UP (ref 17–32)
CO2 SERPL-SCNC: 28 MMOL/L — SIGNIFICANT CHANGE UP (ref 17–32)
CO2 SERPL-SCNC: 28 MMOL/L — SIGNIFICANT CHANGE UP (ref 17–32)
CO2 SERPL-SCNC: 29 MMOL/L — SIGNIFICANT CHANGE UP (ref 17–32)
CO2 SERPL-SCNC: 29 MMOL/L — SIGNIFICANT CHANGE UP (ref 17–32)
COLOR FLD: SIGNIFICANT CHANGE UP
COLOR SPEC: YELLOW — SIGNIFICANT CHANGE UP
COLOR SPEC: YELLOW — SIGNIFICANT CHANGE UP
CREAT SERPL-MCNC: 0.5 MG/DL — LOW (ref 0.7–1.5)
CREAT SERPL-MCNC: 0.6 MG/DL — LOW (ref 0.7–1.5)
CREAT SERPL-MCNC: 0.7 MG/DL — SIGNIFICANT CHANGE UP (ref 0.7–1.5)
CREAT SERPL-MCNC: 0.8 MG/DL — SIGNIFICANT CHANGE UP (ref 0.7–1.5)
CREAT SERPL-MCNC: 0.8 MG/DL — SIGNIFICANT CHANGE UP (ref 0.7–1.5)
CREAT SERPL-MCNC: 0.9 MG/DL — SIGNIFICANT CHANGE UP (ref 0.7–1.5)
CREAT SERPL-MCNC: 0.9 MG/DL — SIGNIFICANT CHANGE UP (ref 0.7–1.5)
CREAT SERPL-MCNC: 1 MG/DL — SIGNIFICANT CHANGE UP (ref 0.7–1.5)
CREAT SERPL-MCNC: 1.1 MG/DL — SIGNIFICANT CHANGE UP (ref 0.7–1.5)
CREAT SERPL-MCNC: 1.2 MG/DL — SIGNIFICANT CHANGE UP (ref 0.7–1.5)
CREAT SERPL-MCNC: 1.3 MG/DL — SIGNIFICANT CHANGE UP (ref 0.7–1.5)
CREAT SERPL-MCNC: 1.4 MG/DL — SIGNIFICANT CHANGE UP (ref 0.7–1.5)
CREAT SERPL-MCNC: 1.5 MG/DL — SIGNIFICANT CHANGE UP (ref 0.7–1.5)
CREAT SERPL-MCNC: 1.8 MG/DL — HIGH (ref 0.7–1.5)
CREAT SERPL-MCNC: 1.8 MG/DL — HIGH (ref 0.7–1.5)
CREAT SERPL-MCNC: 1.9 MG/DL — HIGH (ref 0.7–1.5)
CREAT SERPL-MCNC: 2 MG/DL — HIGH (ref 0.7–1.5)
CREAT SERPL-MCNC: 2.1 MG/DL — HIGH (ref 0.7–1.5)
CREAT SERPL-MCNC: 2.2 MG/DL — HIGH (ref 0.7–1.5)
CREAT SERPL-MCNC: 2.4 MG/DL — HIGH (ref 0.7–1.5)
CREAT SERPL-MCNC: 2.5 MG/DL — HIGH (ref 0.7–1.5)
CREAT SERPL-MCNC: 2.6 MG/DL — HIGH (ref 0.7–1.5)
CREAT SERPL-MCNC: 3.1 MG/DL — HIGH (ref 0.7–1.5)
CREAT SERPL-MCNC: 3.2 MG/DL — HIGH (ref 0.7–1.5)
CREAT SERPL-MCNC: 3.2 MG/DL — HIGH (ref 0.7–1.5)
CREAT SERPL-MCNC: 3.6 MG/DL — HIGH (ref 0.7–1.5)
CREAT SERPL-MCNC: 3.7 MG/DL — HIGH (ref 0.7–1.5)
CREAT SERPL-MCNC: 3.8 MG/DL — HIGH (ref 0.7–1.5)
CREAT SERPL-MCNC: 3.9 MG/DL — HIGH (ref 0.7–1.5)
CREAT SERPL-MCNC: 4.2 MG/DL — CRITICAL HIGH (ref 0.7–1.5)
CREAT SERPL-MCNC: 4.4 MG/DL — CRITICAL HIGH (ref 0.7–1.5)
CREAT SERPL-MCNC: 4.4 MG/DL — CRITICAL HIGH (ref 0.7–1.5)
CREAT SERPL-MCNC: 4.6 MG/DL — CRITICAL HIGH (ref 0.7–1.5)
CREAT SERPL-MCNC: 4.6 MG/DL — CRITICAL HIGH (ref 0.7–1.5)
CREAT SERPL-MCNC: 4.7 MG/DL — CRITICAL HIGH (ref 0.7–1.5)
CREAT SERPL-MCNC: 4.8 MG/DL — CRITICAL HIGH (ref 0.7–1.5)
CREAT SERPL-MCNC: <0.5 MG/DL — LOW (ref 0.7–1.5)
CRP SERPL-MCNC: 27.69 MG/DL — HIGH (ref 0–0.4)
CRYPTOC AG FLD QL: NEGATIVE — SIGNIFICANT CHANGE UP
CULTURE RESULTS: NO GROWTH — SIGNIFICANT CHANGE UP
CULTURE RESULTS: SIGNIFICANT CHANGE UP
D DIMER BLD IA.RAPID-MCNC: 3675 NG/ML DDU — HIGH (ref 0–230)
DIFF PNL FLD: ABNORMAL
DIFF PNL FLD: ABNORMAL
DSDNA AB SER-ACNC: <12 IU/ML — SIGNIFICANT CHANGE UP
ENDOMYSIUM IGA TITR SER IF: NEGATIVE — SIGNIFICANT CHANGE UP
ENDOMYSIUM IGA TITR SER: SIGNIFICANT CHANGE UP
EOSINOPHIL # BLD AUTO: 0 K/UL — SIGNIFICANT CHANGE UP (ref 0–0.7)
EOSINOPHIL # BLD AUTO: 0.01 K/UL — SIGNIFICANT CHANGE UP (ref 0–0.7)
EOSINOPHIL # BLD AUTO: 0.03 K/UL — SIGNIFICANT CHANGE UP (ref 0–0.7)
EOSINOPHIL # BLD AUTO: 0.03 K/UL — SIGNIFICANT CHANGE UP (ref 0–0.7)
EOSINOPHIL # BLD AUTO: 0.1 K/UL — SIGNIFICANT CHANGE UP (ref 0–0.7)
EOSINOPHIL # BLD AUTO: 0.11 K/UL — SIGNIFICANT CHANGE UP (ref 0–0.7)
EOSINOPHIL # BLD AUTO: 0.16 K/UL — SIGNIFICANT CHANGE UP (ref 0–0.7)
EOSINOPHIL NFR BLD AUTO: 0 % — SIGNIFICANT CHANGE UP (ref 0–8)
EOSINOPHIL NFR BLD AUTO: 0.1 % — SIGNIFICANT CHANGE UP (ref 0–8)
EOSINOPHIL NFR BLD AUTO: 0.2 % — SIGNIFICANT CHANGE UP (ref 0–8)
EOSINOPHIL NFR BLD AUTO: 1 % — SIGNIFICANT CHANGE UP (ref 0–8)
EOSINOPHIL NFR BLD AUTO: 1.1 % — SIGNIFICANT CHANGE UP (ref 0–8)
EOSINOPHIL NFR BLD AUTO: 1.1 % — SIGNIFICANT CHANGE UP (ref 0–8)
EOSINOPHIL NFR BLD AUTO: 1.2 % — SIGNIFICANT CHANGE UP (ref 0–8)
EPI CELLS # UR: 0 /HPF — SIGNIFICANT CHANGE UP (ref 0–5)
EPI CELLS # UR: 8 /HPF — HIGH (ref 0–5)
ERYTHROCYTE [SEDIMENTATION RATE] IN BLOOD: 44 MM/HR — HIGH (ref 0–20)
ERYTHROCYTE [SEDIMENTATION RATE] IN BLOOD: 51 MM/HR — HIGH (ref 0–20)
ERYTHROCYTE [SEDIMENTATION RATE] IN BLOOD: 55 MM/HR — HIGH (ref 0–20)
ESTIMATED AVERAGE GLUCOSE: 148 MG/DL — HIGH (ref 68–114)
FIBRINOGEN PPP-MCNC: >700 MG/DL — HIGH (ref 204.4–570.6)
FLU A RESULT: NEGATIVE — SIGNIFICANT CHANGE UP
FLU A RESULT: NEGATIVE — SIGNIFICANT CHANGE UP
FLUAV AG NPH QL: NEGATIVE — SIGNIFICANT CHANGE UP
FLUAV H3 RNA SPEC QL NAA+PROBE: DETECTED
FLUBV AG NPH QL: NEGATIVE — SIGNIFICANT CHANGE UP
FLUID INTAKE SUBSTANCE CLASS: SIGNIFICANT CHANGE UP
FLUID SEGMENTED GRANULOCYTES: SIGNIFICANT CHANGE UP %
FUNGITELL: 204 PG/ML — HIGH
FUNGITELL: 49 PG/ML — SIGNIFICANT CHANGE UP
FUNGITELL: <31 PG/ML — SIGNIFICANT CHANGE UP
GAMMA GLOBULIN: 0.7 G/DL — SIGNIFICANT CHANGE UP (ref 0.6–1.6)
GAMMA INTERFERON BACKGROUND BLD IA-ACNC: 0.01 IU/ML — SIGNIFICANT CHANGE UP
GAS PNL BLDA: SIGNIFICANT CHANGE UP
GAS PNL BLDV: 142 MMOL/L — SIGNIFICANT CHANGE UP (ref 136–145)
GAS PNL BLDV: SIGNIFICANT CHANGE UP
GBM IGG SER-ACNC: <1 AI — SIGNIFICANT CHANGE UP
GIANT PLATELETS BLD QL SMEAR: PRESENT — SIGNIFICANT CHANGE UP
GLUCOSE BLDC GLUCOMTR-MCNC: 100 MG/DL — HIGH (ref 70–99)
GLUCOSE BLDC GLUCOMTR-MCNC: 101 MG/DL — HIGH (ref 70–99)
GLUCOSE BLDC GLUCOMTR-MCNC: 101 MG/DL — HIGH (ref 70–99)
GLUCOSE BLDC GLUCOMTR-MCNC: 102 MG/DL — HIGH (ref 70–99)
GLUCOSE BLDC GLUCOMTR-MCNC: 103 MG/DL — HIGH (ref 70–99)
GLUCOSE BLDC GLUCOMTR-MCNC: 104 MG/DL — HIGH (ref 70–99)
GLUCOSE BLDC GLUCOMTR-MCNC: 104 MG/DL — HIGH (ref 70–99)
GLUCOSE BLDC GLUCOMTR-MCNC: 105 MG/DL — HIGH (ref 70–99)
GLUCOSE BLDC GLUCOMTR-MCNC: 106 MG/DL — HIGH (ref 70–99)
GLUCOSE BLDC GLUCOMTR-MCNC: 107 MG/DL — HIGH (ref 70–99)
GLUCOSE BLDC GLUCOMTR-MCNC: 108 MG/DL — HIGH (ref 70–99)
GLUCOSE BLDC GLUCOMTR-MCNC: 109 MG/DL — HIGH (ref 70–99)
GLUCOSE BLDC GLUCOMTR-MCNC: 109 MG/DL — HIGH (ref 70–99)
GLUCOSE BLDC GLUCOMTR-MCNC: 110 MG/DL — HIGH (ref 70–99)
GLUCOSE BLDC GLUCOMTR-MCNC: 110 MG/DL — HIGH (ref 70–99)
GLUCOSE BLDC GLUCOMTR-MCNC: 112 MG/DL — HIGH (ref 70–99)
GLUCOSE BLDC GLUCOMTR-MCNC: 112 MG/DL — HIGH (ref 70–99)
GLUCOSE BLDC GLUCOMTR-MCNC: 113 MG/DL — HIGH (ref 70–99)
GLUCOSE BLDC GLUCOMTR-MCNC: 114 MG/DL — HIGH (ref 70–99)
GLUCOSE BLDC GLUCOMTR-MCNC: 115 MG/DL — HIGH (ref 70–99)
GLUCOSE BLDC GLUCOMTR-MCNC: 116 MG/DL — HIGH (ref 70–99)
GLUCOSE BLDC GLUCOMTR-MCNC: 117 MG/DL — HIGH (ref 70–99)
GLUCOSE BLDC GLUCOMTR-MCNC: 119 MG/DL — HIGH (ref 70–99)
GLUCOSE BLDC GLUCOMTR-MCNC: 120 MG/DL — HIGH (ref 70–99)
GLUCOSE BLDC GLUCOMTR-MCNC: 121 MG/DL — HIGH (ref 70–99)
GLUCOSE BLDC GLUCOMTR-MCNC: 121 MG/DL — HIGH (ref 70–99)
GLUCOSE BLDC GLUCOMTR-MCNC: 122 MG/DL — HIGH (ref 70–99)
GLUCOSE BLDC GLUCOMTR-MCNC: 123 MG/DL — HIGH (ref 70–99)
GLUCOSE BLDC GLUCOMTR-MCNC: 125 MG/DL — HIGH (ref 70–99)
GLUCOSE BLDC GLUCOMTR-MCNC: 125 MG/DL — HIGH (ref 70–99)
GLUCOSE BLDC GLUCOMTR-MCNC: 126 MG/DL — HIGH (ref 70–99)
GLUCOSE BLDC GLUCOMTR-MCNC: 126 MG/DL — HIGH (ref 70–99)
GLUCOSE BLDC GLUCOMTR-MCNC: 127 MG/DL — HIGH (ref 70–99)
GLUCOSE BLDC GLUCOMTR-MCNC: 128 MG/DL — HIGH (ref 70–99)
GLUCOSE BLDC GLUCOMTR-MCNC: 128 MG/DL — HIGH (ref 70–99)
GLUCOSE BLDC GLUCOMTR-MCNC: 129 MG/DL — HIGH (ref 70–99)
GLUCOSE BLDC GLUCOMTR-MCNC: 130 MG/DL — HIGH (ref 70–99)
GLUCOSE BLDC GLUCOMTR-MCNC: 130 MG/DL — HIGH (ref 70–99)
GLUCOSE BLDC GLUCOMTR-MCNC: 131 MG/DL — HIGH (ref 70–99)
GLUCOSE BLDC GLUCOMTR-MCNC: 132 MG/DL — HIGH (ref 70–99)
GLUCOSE BLDC GLUCOMTR-MCNC: 133 MG/DL — HIGH (ref 70–99)
GLUCOSE BLDC GLUCOMTR-MCNC: 133 MG/DL — HIGH (ref 70–99)
GLUCOSE BLDC GLUCOMTR-MCNC: 134 MG/DL — HIGH (ref 70–99)
GLUCOSE BLDC GLUCOMTR-MCNC: 134 MG/DL — HIGH (ref 70–99)
GLUCOSE BLDC GLUCOMTR-MCNC: 135 MG/DL — HIGH (ref 70–99)
GLUCOSE BLDC GLUCOMTR-MCNC: 136 MG/DL — HIGH (ref 70–99)
GLUCOSE BLDC GLUCOMTR-MCNC: 136 MG/DL — HIGH (ref 70–99)
GLUCOSE BLDC GLUCOMTR-MCNC: 137 MG/DL — HIGH (ref 70–99)
GLUCOSE BLDC GLUCOMTR-MCNC: 138 MG/DL — HIGH (ref 70–99)
GLUCOSE BLDC GLUCOMTR-MCNC: 139 MG/DL — HIGH (ref 70–99)
GLUCOSE BLDC GLUCOMTR-MCNC: 140 MG/DL — HIGH (ref 70–99)
GLUCOSE BLDC GLUCOMTR-MCNC: 140 MG/DL — HIGH (ref 70–99)
GLUCOSE BLDC GLUCOMTR-MCNC: 141 MG/DL — HIGH (ref 70–99)
GLUCOSE BLDC GLUCOMTR-MCNC: 141 MG/DL — HIGH (ref 70–99)
GLUCOSE BLDC GLUCOMTR-MCNC: 142 MG/DL — HIGH (ref 70–99)
GLUCOSE BLDC GLUCOMTR-MCNC: 143 MG/DL — HIGH (ref 70–99)
GLUCOSE BLDC GLUCOMTR-MCNC: 143 MG/DL — HIGH (ref 70–99)
GLUCOSE BLDC GLUCOMTR-MCNC: 145 MG/DL — HIGH (ref 70–99)
GLUCOSE BLDC GLUCOMTR-MCNC: 145 MG/DL — HIGH (ref 70–99)
GLUCOSE BLDC GLUCOMTR-MCNC: 146 MG/DL — HIGH (ref 70–99)
GLUCOSE BLDC GLUCOMTR-MCNC: 147 MG/DL — HIGH (ref 70–99)
GLUCOSE BLDC GLUCOMTR-MCNC: 149 MG/DL — HIGH (ref 70–99)
GLUCOSE BLDC GLUCOMTR-MCNC: 150 MG/DL — HIGH (ref 70–99)
GLUCOSE BLDC GLUCOMTR-MCNC: 150 MG/DL — HIGH (ref 70–99)
GLUCOSE BLDC GLUCOMTR-MCNC: 151 MG/DL — HIGH (ref 70–99)
GLUCOSE BLDC GLUCOMTR-MCNC: 152 MG/DL — HIGH (ref 70–99)
GLUCOSE BLDC GLUCOMTR-MCNC: 153 MG/DL — HIGH (ref 70–99)
GLUCOSE BLDC GLUCOMTR-MCNC: 153 MG/DL — HIGH (ref 70–99)
GLUCOSE BLDC GLUCOMTR-MCNC: 154 MG/DL — HIGH (ref 70–99)
GLUCOSE BLDC GLUCOMTR-MCNC: 155 MG/DL — HIGH (ref 70–99)
GLUCOSE BLDC GLUCOMTR-MCNC: 155 MG/DL — HIGH (ref 70–99)
GLUCOSE BLDC GLUCOMTR-MCNC: 156 MG/DL — HIGH (ref 70–99)
GLUCOSE BLDC GLUCOMTR-MCNC: 157 MG/DL — HIGH (ref 70–99)
GLUCOSE BLDC GLUCOMTR-MCNC: 158 MG/DL — HIGH (ref 70–99)
GLUCOSE BLDC GLUCOMTR-MCNC: 159 MG/DL — HIGH (ref 70–99)
GLUCOSE BLDC GLUCOMTR-MCNC: 160 MG/DL — HIGH (ref 70–99)
GLUCOSE BLDC GLUCOMTR-MCNC: 161 MG/DL — HIGH (ref 70–99)
GLUCOSE BLDC GLUCOMTR-MCNC: 162 MG/DL — HIGH (ref 70–99)
GLUCOSE BLDC GLUCOMTR-MCNC: 163 MG/DL — HIGH (ref 70–99)
GLUCOSE BLDC GLUCOMTR-MCNC: 164 MG/DL — HIGH (ref 70–99)
GLUCOSE BLDC GLUCOMTR-MCNC: 165 MG/DL — HIGH (ref 70–99)
GLUCOSE BLDC GLUCOMTR-MCNC: 166 MG/DL — HIGH (ref 70–99)
GLUCOSE BLDC GLUCOMTR-MCNC: 168 MG/DL — HIGH (ref 70–99)
GLUCOSE BLDC GLUCOMTR-MCNC: 169 MG/DL — HIGH (ref 70–99)
GLUCOSE BLDC GLUCOMTR-MCNC: 171 MG/DL — HIGH (ref 70–99)
GLUCOSE BLDC GLUCOMTR-MCNC: 172 MG/DL — HIGH (ref 70–99)
GLUCOSE BLDC GLUCOMTR-MCNC: 173 MG/DL — HIGH (ref 70–99)
GLUCOSE BLDC GLUCOMTR-MCNC: 174 MG/DL — HIGH (ref 70–99)
GLUCOSE BLDC GLUCOMTR-MCNC: 174 MG/DL — HIGH (ref 70–99)
GLUCOSE BLDC GLUCOMTR-MCNC: 175 MG/DL — HIGH (ref 70–99)
GLUCOSE BLDC GLUCOMTR-MCNC: 176 MG/DL — HIGH (ref 70–99)
GLUCOSE BLDC GLUCOMTR-MCNC: 176 MG/DL — HIGH (ref 70–99)
GLUCOSE BLDC GLUCOMTR-MCNC: 177 MG/DL — HIGH (ref 70–99)
GLUCOSE BLDC GLUCOMTR-MCNC: 178 MG/DL — HIGH (ref 70–99)
GLUCOSE BLDC GLUCOMTR-MCNC: 178 MG/DL — HIGH (ref 70–99)
GLUCOSE BLDC GLUCOMTR-MCNC: 179 MG/DL — HIGH (ref 70–99)
GLUCOSE BLDC GLUCOMTR-MCNC: 180 MG/DL — HIGH (ref 70–99)
GLUCOSE BLDC GLUCOMTR-MCNC: 181 MG/DL — HIGH (ref 70–99)
GLUCOSE BLDC GLUCOMTR-MCNC: 182 MG/DL — HIGH (ref 70–99)
GLUCOSE BLDC GLUCOMTR-MCNC: 183 MG/DL — HIGH (ref 70–99)
GLUCOSE BLDC GLUCOMTR-MCNC: 184 MG/DL — HIGH (ref 70–99)
GLUCOSE BLDC GLUCOMTR-MCNC: 184 MG/DL — HIGH (ref 70–99)
GLUCOSE BLDC GLUCOMTR-MCNC: 185 MG/DL — HIGH (ref 70–99)
GLUCOSE BLDC GLUCOMTR-MCNC: 186 MG/DL — HIGH (ref 70–99)
GLUCOSE BLDC GLUCOMTR-MCNC: 187 MG/DL — HIGH (ref 70–99)
GLUCOSE BLDC GLUCOMTR-MCNC: 187 MG/DL — HIGH (ref 70–99)
GLUCOSE BLDC GLUCOMTR-MCNC: 188 MG/DL — HIGH (ref 70–99)
GLUCOSE BLDC GLUCOMTR-MCNC: 189 MG/DL — HIGH (ref 70–99)
GLUCOSE BLDC GLUCOMTR-MCNC: 191 MG/DL — HIGH (ref 70–99)
GLUCOSE BLDC GLUCOMTR-MCNC: 192 MG/DL — HIGH (ref 70–99)
GLUCOSE BLDC GLUCOMTR-MCNC: 193 MG/DL — HIGH (ref 70–99)
GLUCOSE BLDC GLUCOMTR-MCNC: 194 MG/DL — HIGH (ref 70–99)
GLUCOSE BLDC GLUCOMTR-MCNC: 196 MG/DL — HIGH (ref 70–99)
GLUCOSE BLDC GLUCOMTR-MCNC: 197 MG/DL — HIGH (ref 70–99)
GLUCOSE BLDC GLUCOMTR-MCNC: 198 MG/DL — HIGH (ref 70–99)
GLUCOSE BLDC GLUCOMTR-MCNC: 200 MG/DL — HIGH (ref 70–99)
GLUCOSE BLDC GLUCOMTR-MCNC: 201 MG/DL — HIGH (ref 70–99)
GLUCOSE BLDC GLUCOMTR-MCNC: 202 MG/DL — HIGH (ref 70–99)
GLUCOSE BLDC GLUCOMTR-MCNC: 203 MG/DL — HIGH (ref 70–99)
GLUCOSE BLDC GLUCOMTR-MCNC: 204 MG/DL — HIGH (ref 70–99)
GLUCOSE BLDC GLUCOMTR-MCNC: 204 MG/DL — HIGH (ref 70–99)
GLUCOSE BLDC GLUCOMTR-MCNC: 205 MG/DL — HIGH (ref 70–99)
GLUCOSE BLDC GLUCOMTR-MCNC: 205 MG/DL — HIGH (ref 70–99)
GLUCOSE BLDC GLUCOMTR-MCNC: 206 MG/DL — HIGH (ref 70–99)
GLUCOSE BLDC GLUCOMTR-MCNC: 207 MG/DL — HIGH (ref 70–99)
GLUCOSE BLDC GLUCOMTR-MCNC: 207 MG/DL — HIGH (ref 70–99)
GLUCOSE BLDC GLUCOMTR-MCNC: 209 MG/DL — HIGH (ref 70–99)
GLUCOSE BLDC GLUCOMTR-MCNC: 210 MG/DL — HIGH (ref 70–99)
GLUCOSE BLDC GLUCOMTR-MCNC: 210 MG/DL — HIGH (ref 70–99)
GLUCOSE BLDC GLUCOMTR-MCNC: 212 MG/DL — HIGH (ref 70–99)
GLUCOSE BLDC GLUCOMTR-MCNC: 213 MG/DL — HIGH (ref 70–99)
GLUCOSE BLDC GLUCOMTR-MCNC: 214 MG/DL — HIGH (ref 70–99)
GLUCOSE BLDC GLUCOMTR-MCNC: 214 MG/DL — HIGH (ref 70–99)
GLUCOSE BLDC GLUCOMTR-MCNC: 215 MG/DL — HIGH (ref 70–99)
GLUCOSE BLDC GLUCOMTR-MCNC: 215 MG/DL — HIGH (ref 70–99)
GLUCOSE BLDC GLUCOMTR-MCNC: 216 MG/DL — HIGH (ref 70–99)
GLUCOSE BLDC GLUCOMTR-MCNC: 217 MG/DL — HIGH (ref 70–99)
GLUCOSE BLDC GLUCOMTR-MCNC: 218 MG/DL — HIGH (ref 70–99)
GLUCOSE BLDC GLUCOMTR-MCNC: 218 MG/DL — HIGH (ref 70–99)
GLUCOSE BLDC GLUCOMTR-MCNC: 219 MG/DL — HIGH (ref 70–99)
GLUCOSE BLDC GLUCOMTR-MCNC: 220 MG/DL — HIGH (ref 70–99)
GLUCOSE BLDC GLUCOMTR-MCNC: 221 MG/DL — HIGH (ref 70–99)
GLUCOSE BLDC GLUCOMTR-MCNC: 222 MG/DL — HIGH (ref 70–99)
GLUCOSE BLDC GLUCOMTR-MCNC: 223 MG/DL — HIGH (ref 70–99)
GLUCOSE BLDC GLUCOMTR-MCNC: 223 MG/DL — HIGH (ref 70–99)
GLUCOSE BLDC GLUCOMTR-MCNC: 224 MG/DL — HIGH (ref 70–99)
GLUCOSE BLDC GLUCOMTR-MCNC: 225 MG/DL — HIGH (ref 70–99)
GLUCOSE BLDC GLUCOMTR-MCNC: 226 MG/DL — HIGH (ref 70–99)
GLUCOSE BLDC GLUCOMTR-MCNC: 227 MG/DL — HIGH (ref 70–99)
GLUCOSE BLDC GLUCOMTR-MCNC: 227 MG/DL — HIGH (ref 70–99)
GLUCOSE BLDC GLUCOMTR-MCNC: 228 MG/DL — HIGH (ref 70–99)
GLUCOSE BLDC GLUCOMTR-MCNC: 229 MG/DL — HIGH (ref 70–99)
GLUCOSE BLDC GLUCOMTR-MCNC: 230 MG/DL — HIGH (ref 70–99)
GLUCOSE BLDC GLUCOMTR-MCNC: 231 MG/DL — HIGH (ref 70–99)
GLUCOSE BLDC GLUCOMTR-MCNC: 233 MG/DL — HIGH (ref 70–99)
GLUCOSE BLDC GLUCOMTR-MCNC: 235 MG/DL — HIGH (ref 70–99)
GLUCOSE BLDC GLUCOMTR-MCNC: 237 MG/DL — HIGH (ref 70–99)
GLUCOSE BLDC GLUCOMTR-MCNC: 238 MG/DL — HIGH (ref 70–99)
GLUCOSE BLDC GLUCOMTR-MCNC: 238 MG/DL — HIGH (ref 70–99)
GLUCOSE BLDC GLUCOMTR-MCNC: 239 MG/DL — HIGH (ref 70–99)
GLUCOSE BLDC GLUCOMTR-MCNC: 239 MG/DL — HIGH (ref 70–99)
GLUCOSE BLDC GLUCOMTR-MCNC: 240 MG/DL — HIGH (ref 70–99)
GLUCOSE BLDC GLUCOMTR-MCNC: 241 MG/DL — HIGH (ref 70–99)
GLUCOSE BLDC GLUCOMTR-MCNC: 242 MG/DL — HIGH (ref 70–99)
GLUCOSE BLDC GLUCOMTR-MCNC: 242 MG/DL — HIGH (ref 70–99)
GLUCOSE BLDC GLUCOMTR-MCNC: 243 MG/DL — HIGH (ref 70–99)
GLUCOSE BLDC GLUCOMTR-MCNC: 243 MG/DL — HIGH (ref 70–99)
GLUCOSE BLDC GLUCOMTR-MCNC: 244 MG/DL — HIGH (ref 70–99)
GLUCOSE BLDC GLUCOMTR-MCNC: 245 MG/DL — HIGH (ref 70–99)
GLUCOSE BLDC GLUCOMTR-MCNC: 245 MG/DL — HIGH (ref 70–99)
GLUCOSE BLDC GLUCOMTR-MCNC: 246 MG/DL — HIGH (ref 70–99)
GLUCOSE BLDC GLUCOMTR-MCNC: 247 MG/DL — HIGH (ref 70–99)
GLUCOSE BLDC GLUCOMTR-MCNC: 247 MG/DL — HIGH (ref 70–99)
GLUCOSE BLDC GLUCOMTR-MCNC: 248 MG/DL — HIGH (ref 70–99)
GLUCOSE BLDC GLUCOMTR-MCNC: 250 MG/DL — HIGH (ref 70–99)
GLUCOSE BLDC GLUCOMTR-MCNC: 251 MG/DL — HIGH (ref 70–99)
GLUCOSE BLDC GLUCOMTR-MCNC: 252 MG/DL — HIGH (ref 70–99)
GLUCOSE BLDC GLUCOMTR-MCNC: 255 MG/DL — HIGH (ref 70–99)
GLUCOSE BLDC GLUCOMTR-MCNC: 255 MG/DL — HIGH (ref 70–99)
GLUCOSE BLDC GLUCOMTR-MCNC: 256 MG/DL — HIGH (ref 70–99)
GLUCOSE BLDC GLUCOMTR-MCNC: 258 MG/DL — HIGH (ref 70–99)
GLUCOSE BLDC GLUCOMTR-MCNC: 259 MG/DL — HIGH (ref 70–99)
GLUCOSE BLDC GLUCOMTR-MCNC: 260 MG/DL — HIGH (ref 70–99)
GLUCOSE BLDC GLUCOMTR-MCNC: 261 MG/DL — HIGH (ref 70–99)
GLUCOSE BLDC GLUCOMTR-MCNC: 263 MG/DL — HIGH (ref 70–99)
GLUCOSE BLDC GLUCOMTR-MCNC: 264 MG/DL — HIGH (ref 70–99)
GLUCOSE BLDC GLUCOMTR-MCNC: 265 MG/DL — HIGH (ref 70–99)
GLUCOSE BLDC GLUCOMTR-MCNC: 266 MG/DL — HIGH (ref 70–99)
GLUCOSE BLDC GLUCOMTR-MCNC: 268 MG/DL — HIGH (ref 70–99)
GLUCOSE BLDC GLUCOMTR-MCNC: 268 MG/DL — HIGH (ref 70–99)
GLUCOSE BLDC GLUCOMTR-MCNC: 269 MG/DL — HIGH (ref 70–99)
GLUCOSE BLDC GLUCOMTR-MCNC: 27 MG/DL — CRITICAL LOW (ref 70–99)
GLUCOSE BLDC GLUCOMTR-MCNC: 270 MG/DL — HIGH (ref 70–99)
GLUCOSE BLDC GLUCOMTR-MCNC: 270 MG/DL — HIGH (ref 70–99)
GLUCOSE BLDC GLUCOMTR-MCNC: 271 MG/DL — HIGH (ref 70–99)
GLUCOSE BLDC GLUCOMTR-MCNC: 272 MG/DL — HIGH (ref 70–99)
GLUCOSE BLDC GLUCOMTR-MCNC: 273 MG/DL — HIGH (ref 70–99)
GLUCOSE BLDC GLUCOMTR-MCNC: 275 MG/DL — HIGH (ref 70–99)
GLUCOSE BLDC GLUCOMTR-MCNC: 277 MG/DL — HIGH (ref 70–99)
GLUCOSE BLDC GLUCOMTR-MCNC: 278 MG/DL — HIGH (ref 70–99)
GLUCOSE BLDC GLUCOMTR-MCNC: 278 MG/DL — HIGH (ref 70–99)
GLUCOSE BLDC GLUCOMTR-MCNC: 279 MG/DL — HIGH (ref 70–99)
GLUCOSE BLDC GLUCOMTR-MCNC: 280 MG/DL — HIGH (ref 70–99)
GLUCOSE BLDC GLUCOMTR-MCNC: 281 MG/DL — HIGH (ref 70–99)
GLUCOSE BLDC GLUCOMTR-MCNC: 281 MG/DL — HIGH (ref 70–99)
GLUCOSE BLDC GLUCOMTR-MCNC: 282 MG/DL — HIGH (ref 70–99)
GLUCOSE BLDC GLUCOMTR-MCNC: 283 MG/DL — HIGH (ref 70–99)
GLUCOSE BLDC GLUCOMTR-MCNC: 284 MG/DL — HIGH (ref 70–99)
GLUCOSE BLDC GLUCOMTR-MCNC: 286 MG/DL — HIGH (ref 70–99)
GLUCOSE BLDC GLUCOMTR-MCNC: 287 MG/DL — HIGH (ref 70–99)
GLUCOSE BLDC GLUCOMTR-MCNC: 287 MG/DL — HIGH (ref 70–99)
GLUCOSE BLDC GLUCOMTR-MCNC: 288 MG/DL — HIGH (ref 70–99)
GLUCOSE BLDC GLUCOMTR-MCNC: 289 MG/DL — HIGH (ref 70–99)
GLUCOSE BLDC GLUCOMTR-MCNC: 289 MG/DL — HIGH (ref 70–99)
GLUCOSE BLDC GLUCOMTR-MCNC: 290 MG/DL — HIGH (ref 70–99)
GLUCOSE BLDC GLUCOMTR-MCNC: 292 MG/DL — HIGH (ref 70–99)
GLUCOSE BLDC GLUCOMTR-MCNC: 293 MG/DL — HIGH (ref 70–99)
GLUCOSE BLDC GLUCOMTR-MCNC: 294 MG/DL — HIGH (ref 70–99)
GLUCOSE BLDC GLUCOMTR-MCNC: 295 MG/DL — HIGH (ref 70–99)
GLUCOSE BLDC GLUCOMTR-MCNC: 295 MG/DL — HIGH (ref 70–99)
GLUCOSE BLDC GLUCOMTR-MCNC: 296 MG/DL — HIGH (ref 70–99)
GLUCOSE BLDC GLUCOMTR-MCNC: 296 MG/DL — HIGH (ref 70–99)
GLUCOSE BLDC GLUCOMTR-MCNC: 299 MG/DL — HIGH (ref 70–99)
GLUCOSE BLDC GLUCOMTR-MCNC: 300 MG/DL — HIGH (ref 70–99)
GLUCOSE BLDC GLUCOMTR-MCNC: 301 MG/DL — HIGH (ref 70–99)
GLUCOSE BLDC GLUCOMTR-MCNC: 302 MG/DL — HIGH (ref 70–99)
GLUCOSE BLDC GLUCOMTR-MCNC: 302 MG/DL — HIGH (ref 70–99)
GLUCOSE BLDC GLUCOMTR-MCNC: 303 MG/DL — HIGH (ref 70–99)
GLUCOSE BLDC GLUCOMTR-MCNC: 304 MG/DL — HIGH (ref 70–99)
GLUCOSE BLDC GLUCOMTR-MCNC: 309 MG/DL — HIGH (ref 70–99)
GLUCOSE BLDC GLUCOMTR-MCNC: 313 MG/DL — HIGH (ref 70–99)
GLUCOSE BLDC GLUCOMTR-MCNC: 314 MG/DL — HIGH (ref 70–99)
GLUCOSE BLDC GLUCOMTR-MCNC: 315 MG/DL — HIGH (ref 70–99)
GLUCOSE BLDC GLUCOMTR-MCNC: 316 MG/DL — HIGH (ref 70–99)
GLUCOSE BLDC GLUCOMTR-MCNC: 316 MG/DL — HIGH (ref 70–99)
GLUCOSE BLDC GLUCOMTR-MCNC: 320 MG/DL — HIGH (ref 70–99)
GLUCOSE BLDC GLUCOMTR-MCNC: 321 MG/DL — HIGH (ref 70–99)
GLUCOSE BLDC GLUCOMTR-MCNC: 321 MG/DL — HIGH (ref 70–99)
GLUCOSE BLDC GLUCOMTR-MCNC: 323 MG/DL — HIGH (ref 70–99)
GLUCOSE BLDC GLUCOMTR-MCNC: 324 MG/DL — HIGH (ref 70–99)
GLUCOSE BLDC GLUCOMTR-MCNC: 327 MG/DL — HIGH (ref 70–99)
GLUCOSE BLDC GLUCOMTR-MCNC: 331 MG/DL — HIGH (ref 70–99)
GLUCOSE BLDC GLUCOMTR-MCNC: 332 MG/DL — HIGH (ref 70–99)
GLUCOSE BLDC GLUCOMTR-MCNC: 333 MG/DL — HIGH (ref 70–99)
GLUCOSE BLDC GLUCOMTR-MCNC: 338 MG/DL — HIGH (ref 70–99)
GLUCOSE BLDC GLUCOMTR-MCNC: 339 MG/DL — HIGH (ref 70–99)
GLUCOSE BLDC GLUCOMTR-MCNC: 344 MG/DL — HIGH (ref 70–99)
GLUCOSE BLDC GLUCOMTR-MCNC: 346 MG/DL — HIGH (ref 70–99)
GLUCOSE BLDC GLUCOMTR-MCNC: 346 MG/DL — HIGH (ref 70–99)
GLUCOSE BLDC GLUCOMTR-MCNC: 349 MG/DL — HIGH (ref 70–99)
GLUCOSE BLDC GLUCOMTR-MCNC: 355 MG/DL — HIGH (ref 70–99)
GLUCOSE BLDC GLUCOMTR-MCNC: 360 MG/DL — HIGH (ref 70–99)
GLUCOSE BLDC GLUCOMTR-MCNC: 364 MG/DL — HIGH (ref 70–99)
GLUCOSE BLDC GLUCOMTR-MCNC: 371 MG/DL — HIGH (ref 70–99)
GLUCOSE BLDC GLUCOMTR-MCNC: 385 MG/DL — HIGH (ref 70–99)
GLUCOSE BLDC GLUCOMTR-MCNC: 388 MG/DL — HIGH (ref 70–99)
GLUCOSE BLDC GLUCOMTR-MCNC: 391 MG/DL — HIGH (ref 70–99)
GLUCOSE BLDC GLUCOMTR-MCNC: 394 MG/DL — HIGH (ref 70–99)
GLUCOSE BLDC GLUCOMTR-MCNC: 395 MG/DL — HIGH (ref 70–99)
GLUCOSE BLDC GLUCOMTR-MCNC: 407 MG/DL — HIGH (ref 70–99)
GLUCOSE BLDC GLUCOMTR-MCNC: 41 MG/DL — CRITICAL LOW (ref 70–99)
GLUCOSE BLDC GLUCOMTR-MCNC: 414 MG/DL — HIGH (ref 70–99)
GLUCOSE BLDC GLUCOMTR-MCNC: 415 MG/DL — HIGH (ref 70–99)
GLUCOSE BLDC GLUCOMTR-MCNC: 421 MG/DL — HIGH (ref 70–99)
GLUCOSE BLDC GLUCOMTR-MCNC: 425 MG/DL — HIGH (ref 70–99)
GLUCOSE BLDC GLUCOMTR-MCNC: 426 MG/DL — HIGH (ref 70–99)
GLUCOSE BLDC GLUCOMTR-MCNC: 427 MG/DL — HIGH (ref 70–99)
GLUCOSE BLDC GLUCOMTR-MCNC: 440 MG/DL — HIGH (ref 70–99)
GLUCOSE BLDC GLUCOMTR-MCNC: 441 MG/DL — HIGH (ref 70–99)
GLUCOSE BLDC GLUCOMTR-MCNC: 442 MG/DL — HIGH (ref 70–99)
GLUCOSE BLDC GLUCOMTR-MCNC: 47 MG/DL — LOW (ref 70–99)
GLUCOSE BLDC GLUCOMTR-MCNC: 48 MG/DL — LOW (ref 70–99)
GLUCOSE BLDC GLUCOMTR-MCNC: 51 MG/DL — LOW (ref 70–99)
GLUCOSE BLDC GLUCOMTR-MCNC: 54 MG/DL — LOW (ref 70–99)
GLUCOSE BLDC GLUCOMTR-MCNC: 55 MG/DL — LOW (ref 70–99)
GLUCOSE BLDC GLUCOMTR-MCNC: 58 MG/DL — LOW (ref 70–99)
GLUCOSE BLDC GLUCOMTR-MCNC: 60 MG/DL — LOW (ref 70–99)
GLUCOSE BLDC GLUCOMTR-MCNC: 65 MG/DL — LOW (ref 70–99)
GLUCOSE BLDC GLUCOMTR-MCNC: 66 MG/DL — LOW (ref 70–99)
GLUCOSE BLDC GLUCOMTR-MCNC: 66 MG/DL — LOW (ref 70–99)
GLUCOSE BLDC GLUCOMTR-MCNC: 67 MG/DL — LOW (ref 70–99)
GLUCOSE BLDC GLUCOMTR-MCNC: 72 MG/DL — SIGNIFICANT CHANGE UP (ref 70–99)
GLUCOSE BLDC GLUCOMTR-MCNC: 73 MG/DL — SIGNIFICANT CHANGE UP (ref 70–99)
GLUCOSE BLDC GLUCOMTR-MCNC: 73 MG/DL — SIGNIFICANT CHANGE UP (ref 70–99)
GLUCOSE BLDC GLUCOMTR-MCNC: 74 MG/DL — SIGNIFICANT CHANGE UP (ref 70–99)
GLUCOSE BLDC GLUCOMTR-MCNC: 75 MG/DL — SIGNIFICANT CHANGE UP (ref 70–99)
GLUCOSE BLDC GLUCOMTR-MCNC: 76 MG/DL — SIGNIFICANT CHANGE UP (ref 70–99)
GLUCOSE BLDC GLUCOMTR-MCNC: 79 MG/DL — SIGNIFICANT CHANGE UP (ref 70–99)
GLUCOSE BLDC GLUCOMTR-MCNC: 80 MG/DL — SIGNIFICANT CHANGE UP (ref 70–99)
GLUCOSE BLDC GLUCOMTR-MCNC: 81 MG/DL — SIGNIFICANT CHANGE UP (ref 70–99)
GLUCOSE BLDC GLUCOMTR-MCNC: 81 MG/DL — SIGNIFICANT CHANGE UP (ref 70–99)
GLUCOSE BLDC GLUCOMTR-MCNC: 82 MG/DL — SIGNIFICANT CHANGE UP (ref 70–99)
GLUCOSE BLDC GLUCOMTR-MCNC: 85 MG/DL — SIGNIFICANT CHANGE UP (ref 70–99)
GLUCOSE BLDC GLUCOMTR-MCNC: 85 MG/DL — SIGNIFICANT CHANGE UP (ref 70–99)
GLUCOSE BLDC GLUCOMTR-MCNC: 86 MG/DL — SIGNIFICANT CHANGE UP (ref 70–99)
GLUCOSE BLDC GLUCOMTR-MCNC: 86 MG/DL — SIGNIFICANT CHANGE UP (ref 70–99)
GLUCOSE BLDC GLUCOMTR-MCNC: 87 MG/DL — SIGNIFICANT CHANGE UP (ref 70–99)
GLUCOSE BLDC GLUCOMTR-MCNC: 88 MG/DL — SIGNIFICANT CHANGE UP (ref 70–99)
GLUCOSE BLDC GLUCOMTR-MCNC: 90 MG/DL — SIGNIFICANT CHANGE UP (ref 70–99)
GLUCOSE BLDC GLUCOMTR-MCNC: 93 MG/DL — SIGNIFICANT CHANGE UP (ref 70–99)
GLUCOSE BLDC GLUCOMTR-MCNC: 94 MG/DL — SIGNIFICANT CHANGE UP (ref 70–99)
GLUCOSE BLDC GLUCOMTR-MCNC: 95 MG/DL — SIGNIFICANT CHANGE UP (ref 70–99)
GLUCOSE BLDC GLUCOMTR-MCNC: 96 MG/DL — SIGNIFICANT CHANGE UP (ref 70–99)
GLUCOSE BLDC GLUCOMTR-MCNC: 97 MG/DL — SIGNIFICANT CHANGE UP (ref 70–99)
GLUCOSE BLDC GLUCOMTR-MCNC: 99 MG/DL — SIGNIFICANT CHANGE UP (ref 70–99)
GLUCOSE BLDC GLUCOMTR-MCNC: 99 MG/DL — SIGNIFICANT CHANGE UP (ref 70–99)
GLUCOSE SERPL-MCNC: 106 MG/DL — HIGH (ref 70–99)
GLUCOSE SERPL-MCNC: 108 MG/DL — HIGH (ref 70–99)
GLUCOSE SERPL-MCNC: 110 MG/DL — HIGH (ref 70–99)
GLUCOSE SERPL-MCNC: 111 MG/DL — HIGH (ref 70–99)
GLUCOSE SERPL-MCNC: 128 MG/DL — HIGH (ref 70–99)
GLUCOSE SERPL-MCNC: 130 MG/DL — HIGH (ref 70–99)
GLUCOSE SERPL-MCNC: 132 MG/DL — HIGH (ref 70–99)
GLUCOSE SERPL-MCNC: 134 MG/DL — HIGH (ref 70–99)
GLUCOSE SERPL-MCNC: 134 MG/DL — HIGH (ref 70–99)
GLUCOSE SERPL-MCNC: 135 MG/DL — HIGH (ref 70–99)
GLUCOSE SERPL-MCNC: 136 MG/DL — HIGH (ref 70–99)
GLUCOSE SERPL-MCNC: 137 MG/DL — HIGH (ref 70–99)
GLUCOSE SERPL-MCNC: 137 MG/DL — HIGH (ref 70–99)
GLUCOSE SERPL-MCNC: 140 MG/DL — HIGH (ref 70–99)
GLUCOSE SERPL-MCNC: 142 MG/DL — HIGH (ref 70–99)
GLUCOSE SERPL-MCNC: 143 MG/DL — HIGH (ref 70–99)
GLUCOSE SERPL-MCNC: 147 MG/DL — HIGH (ref 70–99)
GLUCOSE SERPL-MCNC: 159 MG/DL — HIGH (ref 70–99)
GLUCOSE SERPL-MCNC: 161 MG/DL — HIGH (ref 70–99)
GLUCOSE SERPL-MCNC: 167 MG/DL — HIGH (ref 70–99)
GLUCOSE SERPL-MCNC: 171 MG/DL — HIGH (ref 70–99)
GLUCOSE SERPL-MCNC: 178 MG/DL — HIGH (ref 70–99)
GLUCOSE SERPL-MCNC: 181 MG/DL — HIGH (ref 70–99)
GLUCOSE SERPL-MCNC: 182 MG/DL — HIGH (ref 70–99)
GLUCOSE SERPL-MCNC: 184 MG/DL — HIGH (ref 70–99)
GLUCOSE SERPL-MCNC: 185 MG/DL — HIGH (ref 70–99)
GLUCOSE SERPL-MCNC: 192 MG/DL — HIGH (ref 70–99)
GLUCOSE SERPL-MCNC: 198 MG/DL — HIGH (ref 70–99)
GLUCOSE SERPL-MCNC: 211 MG/DL — HIGH (ref 70–99)
GLUCOSE SERPL-MCNC: 211 MG/DL — HIGH (ref 70–99)
GLUCOSE SERPL-MCNC: 214 MG/DL — HIGH (ref 70–99)
GLUCOSE SERPL-MCNC: 217 MG/DL — HIGH (ref 70–99)
GLUCOSE SERPL-MCNC: 219 MG/DL — HIGH (ref 70–99)
GLUCOSE SERPL-MCNC: 221 MG/DL — HIGH (ref 70–99)
GLUCOSE SERPL-MCNC: 226 MG/DL — HIGH (ref 70–99)
GLUCOSE SERPL-MCNC: 231 MG/DL — HIGH (ref 70–99)
GLUCOSE SERPL-MCNC: 235 MG/DL — HIGH (ref 70–99)
GLUCOSE SERPL-MCNC: 249 MG/DL — HIGH (ref 70–99)
GLUCOSE SERPL-MCNC: 256 MG/DL — HIGH (ref 70–99)
GLUCOSE SERPL-MCNC: 258 MG/DL — HIGH (ref 70–99)
GLUCOSE SERPL-MCNC: 259 MG/DL — HIGH (ref 70–99)
GLUCOSE SERPL-MCNC: 260 MG/DL — HIGH (ref 70–99)
GLUCOSE SERPL-MCNC: 262 MG/DL — HIGH (ref 70–99)
GLUCOSE SERPL-MCNC: 278 MG/DL — HIGH (ref 70–99)
GLUCOSE SERPL-MCNC: 286 MG/DL — HIGH (ref 70–99)
GLUCOSE SERPL-MCNC: 290 MG/DL — HIGH (ref 70–99)
GLUCOSE SERPL-MCNC: 296 MG/DL — HIGH (ref 70–99)
GLUCOSE SERPL-MCNC: 299 MG/DL — HIGH (ref 70–99)
GLUCOSE SERPL-MCNC: 300 MG/DL — HIGH (ref 70–99)
GLUCOSE SERPL-MCNC: 302 MG/DL — HIGH (ref 70–99)
GLUCOSE SERPL-MCNC: 304 MG/DL — HIGH (ref 70–99)
GLUCOSE SERPL-MCNC: 338 MG/DL — HIGH (ref 70–99)
GLUCOSE SERPL-MCNC: 338 MG/DL — HIGH (ref 70–99)
GLUCOSE SERPL-MCNC: 351 MG/DL — HIGH (ref 70–99)
GLUCOSE SERPL-MCNC: 376 MG/DL — HIGH (ref 70–99)
GLUCOSE SERPL-MCNC: 402 MG/DL — HIGH (ref 70–99)
GLUCOSE SERPL-MCNC: 405 MG/DL — HIGH (ref 70–99)
GLUCOSE SERPL-MCNC: 416 MG/DL — HIGH (ref 70–99)
GLUCOSE SERPL-MCNC: 444 MG/DL — HIGH (ref 70–99)
GLUCOSE SERPL-MCNC: 46 MG/DL — LOW (ref 70–99)
GLUCOSE SERPL-MCNC: 54 MG/DL — LOW (ref 70–99)
GLUCOSE SERPL-MCNC: 78 MG/DL — SIGNIFICANT CHANGE UP (ref 70–99)
GLUCOSE SERPL-MCNC: 81 MG/DL — SIGNIFICANT CHANGE UP (ref 70–99)
GLUCOSE SERPL-MCNC: 87 MG/DL — SIGNIFICANT CHANGE UP (ref 70–99)
GLUCOSE SERPL-MCNC: 89 MG/DL — SIGNIFICANT CHANGE UP (ref 70–99)
GLUCOSE SERPL-MCNC: 98 MG/DL — SIGNIFICANT CHANGE UP (ref 70–99)
GLUCOSE SERPL-MCNC: 99 MG/DL — SIGNIFICANT CHANGE UP (ref 70–99)
GLUCOSE UR QL: ABNORMAL
GLUCOSE UR QL: NEGATIVE — SIGNIFICANT CHANGE UP
GRAM STN FLD: SIGNIFICANT CHANGE UP
HAPTOGLOB SERPL-MCNC: 338 MG/DL — HIGH (ref 34–200)
HAPTOGLOB SERPL-MCNC: 344 MG/DL — HIGH (ref 34–200)
HBA1C BLD-MCNC: 6.8 % — HIGH (ref 4–5.6)
HCO3 BLDA-SCNC: 17 MMOL/L — LOW (ref 21–29)
HCO3 BLDA-SCNC: 20 MMOL/L — LOW (ref 21–29)
HCO3 BLDA-SCNC: 20 MMOL/L — LOW (ref 21–29)
HCO3 BLDA-SCNC: 21 MMOL/L — LOW (ref 23–27)
HCO3 BLDA-SCNC: 21 MMOL/L — SIGNIFICANT CHANGE UP (ref 21–29)
HCO3 BLDA-SCNC: 22 MMOL/L — SIGNIFICANT CHANGE UP (ref 21–29)
HCO3 BLDA-SCNC: 23 MMOL/L — SIGNIFICANT CHANGE UP (ref 21–29)
HCO3 BLDA-SCNC: 23 MMOL/L — SIGNIFICANT CHANGE UP (ref 21–29)
HCO3 BLDA-SCNC: 24 MMOL/L — SIGNIFICANT CHANGE UP (ref 21–29)
HCO3 BLDA-SCNC: 24 MMOL/L — SIGNIFICANT CHANGE UP (ref 21–29)
HCO3 BLDA-SCNC: 24 MMOL/L — SIGNIFICANT CHANGE UP (ref 23–27)
HCO3 BLDA-SCNC: 24 MMOL/L — SIGNIFICANT CHANGE UP (ref 23–27)
HCO3 BLDA-SCNC: 25 MMOL/L — SIGNIFICANT CHANGE UP (ref 21–29)
HCO3 BLDA-SCNC: 26 MMOL/L — SIGNIFICANT CHANGE UP (ref 21–29)
HCO3 BLDA-SCNC: 26 MMOL/L — SIGNIFICANT CHANGE UP (ref 21–29)
HCO3 BLDA-SCNC: 27 MMOL/L — SIGNIFICANT CHANGE UP (ref 21–29)
HCO3 BLDA-SCNC: 28 MMOL/L — HIGH (ref 23–27)
HCO3 BLDA-SCNC: 28 MMOL/L — SIGNIFICANT CHANGE UP (ref 21–29)
HCO3 BLDA-SCNC: 29 MMOL/L — HIGH (ref 23–27)
HCO3 BLDA-SCNC: 29 MMOL/L — SIGNIFICANT CHANGE UP (ref 21–29)
HCO3 BLDV-SCNC: 27 MMOL/L — SIGNIFICANT CHANGE UP (ref 22–29)
HCT VFR BLD CALC: 18.3 % — LOW (ref 37–47)
HCT VFR BLD CALC: 18.9 % — LOW (ref 37–47)
HCT VFR BLD CALC: 20.2 % — LOW (ref 37–47)
HCT VFR BLD CALC: 21.2 % — LOW (ref 37–47)
HCT VFR BLD CALC: 21.3 % — LOW (ref 37–47)
HCT VFR BLD CALC: 21.4 % — LOW (ref 37–47)
HCT VFR BLD CALC: 21.5 % — LOW (ref 37–47)
HCT VFR BLD CALC: 22.1 % — LOW (ref 37–47)
HCT VFR BLD CALC: 22.1 % — LOW (ref 37–47)
HCT VFR BLD CALC: 22.2 % — LOW (ref 37–47)
HCT VFR BLD CALC: 22.9 % — LOW (ref 37–47)
HCT VFR BLD CALC: 23 % — LOW (ref 37–47)
HCT VFR BLD CALC: 23.1 % — LOW (ref 37–47)
HCT VFR BLD CALC: 23.2 % — LOW (ref 37–47)
HCT VFR BLD CALC: 23.3 % — LOW (ref 37–47)
HCT VFR BLD CALC: 23.4 % — LOW (ref 37–47)
HCT VFR BLD CALC: 23.4 % — LOW (ref 37–47)
HCT VFR BLD CALC: 23.8 % — LOW (ref 37–47)
HCT VFR BLD CALC: 23.8 % — LOW (ref 37–47)
HCT VFR BLD CALC: 24.1 % — LOW (ref 37–47)
HCT VFR BLD CALC: 24.4 % — LOW (ref 37–47)
HCT VFR BLD CALC: 24.4 % — LOW (ref 37–47)
HCT VFR BLD CALC: 24.7 % — LOW (ref 37–47)
HCT VFR BLD CALC: 24.9 % — LOW (ref 37–47)
HCT VFR BLD CALC: 25 % — LOW (ref 37–47)
HCT VFR BLD CALC: 25.3 % — LOW (ref 37–47)
HCT VFR BLD CALC: 25.5 % — LOW (ref 37–47)
HCT VFR BLD CALC: 25.5 % — LOW (ref 37–47)
HCT VFR BLD CALC: 25.8 % — LOW (ref 37–47)
HCT VFR BLD CALC: 25.9 % — LOW (ref 37–47)
HCT VFR BLD CALC: 26.1 % — LOW (ref 37–47)
HCT VFR BLD CALC: 26.2 % — LOW (ref 37–47)
HCT VFR BLD CALC: 26.3 % — LOW (ref 37–47)
HCT VFR BLD CALC: 26.4 % — LOW (ref 37–47)
HCT VFR BLD CALC: 26.6 % — LOW (ref 37–47)
HCT VFR BLD CALC: 26.7 % — LOW (ref 37–47)
HCT VFR BLD CALC: 26.8 % — LOW (ref 37–47)
HCT VFR BLD CALC: 27.2 % — LOW (ref 37–47)
HCT VFR BLD CALC: 27.3 % — LOW (ref 37–47)
HCT VFR BLD CALC: 27.5 % — LOW (ref 37–47)
HCT VFR BLD CALC: 27.6 % — LOW (ref 37–47)
HCT VFR BLD CALC: 27.7 % — LOW (ref 37–47)
HCT VFR BLD CALC: 28.5 % — LOW (ref 37–47)
HCT VFR BLD CALC: 28.5 % — LOW (ref 37–47)
HCT VFR BLD CALC: 28.6 % — LOW (ref 37–47)
HCT VFR BLD CALC: 29.6 % — LOW (ref 37–47)
HCT VFR BLD CALC: 30.1 % — LOW (ref 37–47)
HCT VFR BLD CALC: 30.1 % — LOW (ref 37–47)
HCT VFR BLD CALC: 31.2 % — LOW (ref 37–47)
HCT VFR BLD CALC: 33.2 % — LOW (ref 37–47)
HCT VFR BLD CALC: 37.4 % — SIGNIFICANT CHANGE UP (ref 37–47)
HCT VFR BLD CALC: 39.6 % — SIGNIFICANT CHANGE UP (ref 37–47)
HCT VFR BLD CALC: 40.1 % — SIGNIFICANT CHANGE UP (ref 37–47)
HCT VFR BLD CALC: 40.8 % — SIGNIFICANT CHANGE UP (ref 37–47)
HCT VFR BLD CALC: 41.3 % — SIGNIFICANT CHANGE UP (ref 37–47)
HCT VFR BLD CALC: 41.6 % — SIGNIFICANT CHANGE UP (ref 37–47)
HCT VFR BLD CALC: 41.7 % — SIGNIFICANT CHANGE UP (ref 37–47)
HCT VFR BLD CALC: 42.9 % — SIGNIFICANT CHANGE UP (ref 37–47)
HCT VFR BLD CALC: 43 % — SIGNIFICANT CHANGE UP (ref 37–47)
HCT VFR BLD CALC: 43.1 % — SIGNIFICANT CHANGE UP (ref 37–47)
HCT VFR BLD CALC: 45.1 % — SIGNIFICANT CHANGE UP (ref 37–47)
HCT VFR BLD CALC: 45.5 % — SIGNIFICANT CHANGE UP (ref 37–47)
HCT VFR BLDA CALC: 42.9 % — SIGNIFICANT CHANGE UP (ref 34–44)
HDLC SERPL-MCNC: 74 MG/DL — SIGNIFICANT CHANGE UP
HGB BLD CALC-MCNC: 14 G/DL — SIGNIFICANT CHANGE UP (ref 14–18)
HGB BLD-MCNC: 10 G/DL — LOW (ref 12–16)
HGB BLD-MCNC: 10 G/DL — LOW (ref 12–16)
HGB BLD-MCNC: 10.4 G/DL — LOW (ref 12–16)
HGB BLD-MCNC: 11.5 G/DL — LOW (ref 12–16)
HGB BLD-MCNC: 12.3 G/DL — SIGNIFICANT CHANGE UP (ref 12–16)
HGB BLD-MCNC: 12.5 G/DL — SIGNIFICANT CHANGE UP (ref 12–16)
HGB BLD-MCNC: 12.5 G/DL — SIGNIFICANT CHANGE UP (ref 12–16)
HGB BLD-MCNC: 12.8 G/DL — SIGNIFICANT CHANGE UP (ref 12–16)
HGB BLD-MCNC: 13 G/DL — SIGNIFICANT CHANGE UP (ref 12–16)
HGB BLD-MCNC: 13.1 G/DL — SIGNIFICANT CHANGE UP (ref 12–16)
HGB BLD-MCNC: 13.2 G/DL — SIGNIFICANT CHANGE UP (ref 12–16)
HGB BLD-MCNC: 13.3 G/DL — SIGNIFICANT CHANGE UP (ref 12–16)
HGB BLD-MCNC: 13.3 G/DL — SIGNIFICANT CHANGE UP (ref 12–16)
HGB BLD-MCNC: 14.7 G/DL — SIGNIFICANT CHANGE UP (ref 12–16)
HGB BLD-MCNC: 14.7 G/DL — SIGNIFICANT CHANGE UP (ref 12–16)
HGB BLD-MCNC: 5.9 G/DL — CRITICAL LOW (ref 12–16)
HGB BLD-MCNC: 6 G/DL — CRITICAL LOW (ref 12–16)
HGB BLD-MCNC: 6.3 G/DL — CRITICAL LOW (ref 12–16)
HGB BLD-MCNC: 6.8 G/DL — CRITICAL LOW (ref 12–16)
HGB BLD-MCNC: 7 G/DL — LOW (ref 12–16)
HGB BLD-MCNC: 7 G/DL — LOW (ref 12–16)
HGB BLD-MCNC: 7.1 G/DL — LOW (ref 12–16)
HGB BLD-MCNC: 7.1 G/DL — LOW (ref 12–16)
HGB BLD-MCNC: 7.2 G/DL — LOW (ref 12–16)
HGB BLD-MCNC: 7.3 G/DL — LOW (ref 12–16)
HGB BLD-MCNC: 7.4 G/DL — LOW (ref 12–16)
HGB BLD-MCNC: 7.5 G/DL — LOW (ref 12–16)
HGB BLD-MCNC: 7.5 G/DL — LOW (ref 12–16)
HGB BLD-MCNC: 7.6 G/DL — LOW (ref 12–16)
HGB BLD-MCNC: 7.7 G/DL — LOW (ref 12–16)
HGB BLD-MCNC: 7.8 G/DL — LOW (ref 12–16)
HGB BLD-MCNC: 7.9 G/DL — LOW (ref 12–16)
HGB BLD-MCNC: 7.9 G/DL — LOW (ref 12–16)
HGB BLD-MCNC: 8 G/DL — LOW (ref 12–16)
HGB BLD-MCNC: 8.1 G/DL — LOW (ref 12–16)
HGB BLD-MCNC: 8.2 G/DL — LOW (ref 12–16)
HGB BLD-MCNC: 8.2 G/DL — LOW (ref 12–16)
HGB BLD-MCNC: 8.3 G/DL — LOW (ref 12–16)
HGB BLD-MCNC: 8.5 G/DL — LOW (ref 12–16)
HGB BLD-MCNC: 8.6 G/DL — LOW (ref 12–16)
HGB BLD-MCNC: 8.6 G/DL — LOW (ref 12–16)
HGB BLD-MCNC: 8.7 G/DL — LOW (ref 12–16)
HGB BLD-MCNC: 8.8 G/DL — LOW (ref 12–16)
HGB BLD-MCNC: 8.8 G/DL — LOW (ref 12–16)
HGB BLD-MCNC: 8.9 G/DL — LOW (ref 12–16)
HGB BLD-MCNC: 8.9 G/DL — LOW (ref 12–16)
HGB BLD-MCNC: 9.1 G/DL — LOW (ref 12–16)
HGB BLD-MCNC: 9.1 G/DL — LOW (ref 12–16)
HGB BLD-MCNC: 9.2 G/DL — LOW (ref 12–16)
HGB BLD-MCNC: 9.3 G/DL — LOW (ref 12–16)
HGB BLD-MCNC: 9.4 G/DL — LOW (ref 12–16)
HGB BLD-MCNC: 9.4 G/DL — LOW (ref 12–16)
HGB BLD-MCNC: 9.5 G/DL — LOW (ref 12–16)
HGB BLD-MCNC: 9.5 G/DL — LOW (ref 12–16)
HGB BLD-MCNC: 9.6 G/DL — LOW (ref 12–16)
HOROWITZ INDEX BLDA+IHG-RTO: 100 — SIGNIFICANT CHANGE UP
HOROWITZ INDEX BLDA+IHG-RTO: 100 — SIGNIFICANT CHANGE UP
HOROWITZ INDEX BLDA+IHG-RTO: 30 — SIGNIFICANT CHANGE UP
HOROWITZ INDEX BLDA+IHG-RTO: 40 — SIGNIFICANT CHANGE UP
HOROWITZ INDEX BLDA+IHG-RTO: 50 — SIGNIFICANT CHANGE UP
HOROWITZ INDEX BLDA+IHG-RTO: 60 — SIGNIFICANT CHANGE UP
HOROWITZ INDEX BLDA+IHG-RTO: 70 — SIGNIFICANT CHANGE UP
HOROWITZ INDEX BLDA+IHG-RTO: 80 — SIGNIFICANT CHANGE UP
HOROWITZ INDEX BLDA+IHG-RTO: 80 — SIGNIFICANT CHANGE UP
HYALINE CASTS # UR AUTO: 14 /LPF — HIGH (ref 0–7)
HYALINE CASTS # UR AUTO: 9 /LPF — HIGH (ref 0–7)
HYPOCHROMIA BLD QL: SLIGHT — SIGNIFICANT CHANGE UP
IMM GRANULOCYTES NFR BLD AUTO: 0.7 % — HIGH (ref 0.1–0.3)
IMM GRANULOCYTES NFR BLD AUTO: 0.8 % — HIGH (ref 0.1–0.3)
IMM GRANULOCYTES NFR BLD AUTO: 0.9 % — HIGH (ref 0.1–0.3)
IMM GRANULOCYTES NFR BLD AUTO: 1 % — HIGH (ref 0.1–0.3)
IMM GRANULOCYTES NFR BLD AUTO: 1.1 % — HIGH (ref 0.1–0.3)
IMM GRANULOCYTES NFR BLD AUTO: 1.2 % — HIGH (ref 0.1–0.3)
IMM GRANULOCYTES NFR BLD AUTO: 1.3 % — HIGH (ref 0.1–0.3)
IMM GRANULOCYTES NFR BLD AUTO: 1.3 % — HIGH (ref 0.1–0.3)
IMM GRANULOCYTES NFR BLD AUTO: 1.4 % — HIGH (ref 0.1–0.3)
IMM GRANULOCYTES NFR BLD AUTO: 1.4 % — HIGH (ref 0.1–0.3)
IMM GRANULOCYTES NFR BLD AUTO: 1.5 % — HIGH (ref 0.1–0.3)
IMM GRANULOCYTES NFR BLD AUTO: 1.6 % — HIGH (ref 0.1–0.3)
IMM GRANULOCYTES NFR BLD AUTO: 1.7 % — HIGH (ref 0.1–0.3)
IMM GRANULOCYTES NFR BLD AUTO: 1.7 % — HIGH (ref 0.1–0.3)
IMM GRANULOCYTES NFR BLD AUTO: 1.8 % — HIGH (ref 0.1–0.3)
IMM GRANULOCYTES NFR BLD AUTO: 1.8 % — HIGH (ref 0.1–0.3)
IMM GRANULOCYTES NFR BLD AUTO: 1.9 % — HIGH (ref 0.1–0.3)
IMM GRANULOCYTES NFR BLD AUTO: 2 % — HIGH (ref 0.1–0.3)
IMM GRANULOCYTES NFR BLD AUTO: 2.1 % — HIGH (ref 0.1–0.3)
IMM GRANULOCYTES NFR BLD AUTO: 2.1 % — HIGH (ref 0.1–0.3)
IMM GRANULOCYTES NFR BLD AUTO: 2.2 % — HIGH (ref 0.1–0.3)
IMM GRANULOCYTES NFR BLD AUTO: 2.3 % — HIGH (ref 0.1–0.3)
IMM GRANULOCYTES NFR BLD AUTO: 2.4 % — HIGH (ref 0.1–0.3)
IMM GRANULOCYTES NFR BLD AUTO: 2.5 % — HIGH (ref 0.1–0.3)
IMM GRANULOCYTES NFR BLD AUTO: 2.6 % — HIGH (ref 0.1–0.3)
IMM GRANULOCYTES NFR BLD AUTO: 2.6 % — HIGH (ref 0.1–0.3)
IMM GRANULOCYTES NFR BLD AUTO: 2.8 % — HIGH (ref 0.1–0.3)
IMM GRANULOCYTES NFR BLD AUTO: 2.9 % — HIGH (ref 0.1–0.3)
IMM GRANULOCYTES NFR BLD AUTO: 3.6 % — HIGH (ref 0.1–0.3)
IMM GRANULOCYTES NFR BLD AUTO: 3.6 % — HIGH (ref 0.1–0.3)
IMM GRANULOCYTES NFR BLD AUTO: 3.7 % — HIGH (ref 0.1–0.3)
IMM GRANULOCYTES NFR BLD AUTO: 3.9 % — HIGH (ref 0.1–0.3)
IMM GRANULOCYTES NFR BLD AUTO: 4.4 % — HIGH (ref 0.1–0.3)
INR BLD: 1 RATIO — SIGNIFICANT CHANGE UP (ref 0.65–1.3)
INR BLD: 1.05 RATIO — SIGNIFICANT CHANGE UP (ref 0.65–1.3)
INR BLD: 1.16 RATIO — SIGNIFICANT CHANGE UP (ref 0.65–1.3)
INR BLD: 1.17 RATIO — SIGNIFICANT CHANGE UP (ref 0.65–1.3)
INR BLD: 1.17 RATIO — SIGNIFICANT CHANGE UP (ref 0.65–1.3)
INR BLD: 1.18 RATIO — SIGNIFICANT CHANGE UP (ref 0.65–1.3)
INR BLD: 1.22 RATIO — SIGNIFICANT CHANGE UP (ref 0.65–1.3)
INR BLD: 1.27 RATIO — SIGNIFICANT CHANGE UP (ref 0.65–1.3)
INR BLD: 1.27 RATIO — SIGNIFICANT CHANGE UP (ref 0.65–1.3)
INR BLD: 1.47 RATIO — HIGH (ref 0.65–1.3)
INR BLD: 1.52 RATIO — HIGH (ref 0.65–1.3)
INR BLD: 1.63 RATIO — HIGH (ref 0.65–1.3)
INR BLD: 1.65 RATIO — HIGH (ref 0.65–1.3)
INR BLD: 1.65 RATIO — HIGH (ref 0.65–1.3)
INR BLD: 1.67 RATIO — HIGH (ref 0.65–1.3)
INR BLD: 1.81 RATIO — HIGH (ref 0.65–1.3)
INR BLD: 2.45 RATIO — HIGH (ref 0.65–1.3)
INR BLD: 2.7 RATIO — HIGH (ref 0.65–1.3)
INR BLD: 2.94 RATIO — HIGH (ref 0.65–1.3)
INR BLD: 3.55 RATIO — HIGH (ref 0.65–1.3)
INR BLD: 3.66 RATIO — HIGH (ref 0.65–1.3)
INR PPP: 3.3 RATIO
INTERPRETATION SERPL IFE-IMP: SIGNIFICANT CHANGE UP
KAPPA LC SER QL IFE: 4.52 MG/DL — HIGH (ref 0.33–1.94)
KAPPA/LAMBDA FREE LIGHT CHAIN RATIO, SERUM: 1.37 RATIO — SIGNIFICANT CHANGE UP (ref 0.26–1.65)
KETONES UR-MCNC: ABNORMAL
KETONES UR-MCNC: NEGATIVE — SIGNIFICANT CHANGE UP
LACTATE BLDV-MCNC: 1.8 MMOL/L — HIGH (ref 0.5–1.6)
LACTATE SERPL-SCNC: 1.1 MMOL/L — SIGNIFICANT CHANGE UP (ref 0.7–2)
LACTATE SERPL-SCNC: 1.5 MMOL/L — SIGNIFICANT CHANGE UP (ref 0.7–2)
LACTATE SERPL-SCNC: 2 MMOL/L — SIGNIFICANT CHANGE UP (ref 0.7–2)
LACTATE SERPL-SCNC: 2.2 MMOL/L — HIGH (ref 0.7–2)
LACTATE SERPL-SCNC: 2.6 MMOL/L — HIGH (ref 0.7–2)
LACTATE SERPL-SCNC: 2.9 MMOL/L — HIGH (ref 0.7–2)
LACTATE SERPL-SCNC: 3.1 MMOL/L — HIGH (ref 0.7–2)
LACTATE SERPL-SCNC: 6.1 MMOL/L — CRITICAL HIGH (ref 0.7–2)
LAMBDA LC SER QL IFE: 3.3 MG/DL — HIGH (ref 0.57–2.63)
LDH SERPL L TO P-CCNC: 468 — HIGH (ref 50–242)
LDH SERPL L TO P-CCNC: 495 — HIGH (ref 50–242)
LDH SERPL L TO P-CCNC: 607 — HIGH (ref 50–242)
LDH SERPL L TO P-CCNC: 628 — HIGH (ref 50–242)
LEGIONELLA AG UR QL: NEGATIVE — SIGNIFICANT CHANGE UP
LEGIONELLA AG UR QL: NEGATIVE — SIGNIFICANT CHANGE UP
LEUKOCYTE ESTERASE UR-ACNC: ABNORMAL
LEUKOCYTE ESTERASE UR-ACNC: NEGATIVE — SIGNIFICANT CHANGE UP
LIPID PNL WITH DIRECT LDL SERPL: 109 MG/DL — SIGNIFICANT CHANGE UP (ref 4–129)
LYMPHOCYTES # BLD AUTO: 0 % — LOW (ref 20.5–51.1)
LYMPHOCYTES # BLD AUTO: 0 K/UL — LOW (ref 1.2–3.4)
LYMPHOCYTES # BLD AUTO: 0.04 K/UL — LOW (ref 1.2–3.4)
LYMPHOCYTES # BLD AUTO: 0.16 K/UL — LOW (ref 1.2–3.4)
LYMPHOCYTES # BLD AUTO: 0.18 K/UL — LOW (ref 1.2–3.4)
LYMPHOCYTES # BLD AUTO: 0.19 K/UL — LOW (ref 1.2–3.4)
LYMPHOCYTES # BLD AUTO: 0.2 K/UL — LOW (ref 1.2–3.4)
LYMPHOCYTES # BLD AUTO: 0.2 K/UL — LOW (ref 1.2–3.4)
LYMPHOCYTES # BLD AUTO: 0.22 K/UL — LOW (ref 1.2–3.4)
LYMPHOCYTES # BLD AUTO: 0.23 K/UL — LOW (ref 1.2–3.4)
LYMPHOCYTES # BLD AUTO: 0.23 K/UL — LOW (ref 1.2–3.4)
LYMPHOCYTES # BLD AUTO: 0.25 K/UL — LOW (ref 1.2–3.4)
LYMPHOCYTES # BLD AUTO: 0.29 K/UL — LOW (ref 1.2–3.4)
LYMPHOCYTES # BLD AUTO: 0.29 K/UL — LOW (ref 1.2–3.4)
LYMPHOCYTES # BLD AUTO: 0.3 K/UL — LOW (ref 1.2–3.4)
LYMPHOCYTES # BLD AUTO: 0.31 K/UL — LOW (ref 1.2–3.4)
LYMPHOCYTES # BLD AUTO: 0.32 K/UL — LOW (ref 1.2–3.4)
LYMPHOCYTES # BLD AUTO: 0.32 K/UL — LOW (ref 1.2–3.4)
LYMPHOCYTES # BLD AUTO: 0.34 K/UL — LOW (ref 1.2–3.4)
LYMPHOCYTES # BLD AUTO: 0.35 K/UL — LOW (ref 1.2–3.4)
LYMPHOCYTES # BLD AUTO: 0.38 K/UL — LOW (ref 1.2–3.4)
LYMPHOCYTES # BLD AUTO: 0.39 K/UL — LOW (ref 1.2–3.4)
LYMPHOCYTES # BLD AUTO: 0.43 K/UL — LOW (ref 1.2–3.4)
LYMPHOCYTES # BLD AUTO: 0.45 K/UL — LOW (ref 1.2–3.4)
LYMPHOCYTES # BLD AUTO: 0.48 K/UL — LOW (ref 1.2–3.4)
LYMPHOCYTES # BLD AUTO: 0.5 K/UL — LOW (ref 1.2–3.4)
LYMPHOCYTES # BLD AUTO: 0.51 K/UL — LOW (ref 1.2–3.4)
LYMPHOCYTES # BLD AUTO: 0.53 K/UL — LOW (ref 1.2–3.4)
LYMPHOCYTES # BLD AUTO: 0.56 K/UL — LOW (ref 1.2–3.4)
LYMPHOCYTES # BLD AUTO: 0.58 K/UL — LOW (ref 1.2–3.4)
LYMPHOCYTES # BLD AUTO: 0.59 K/UL — LOW (ref 1.2–3.4)
LYMPHOCYTES # BLD AUTO: 0.6 K/UL — LOW (ref 1.2–3.4)
LYMPHOCYTES # BLD AUTO: 0.6 K/UL — LOW (ref 1.2–3.4)
LYMPHOCYTES # BLD AUTO: 0.61 K/UL — LOW (ref 1.2–3.4)
LYMPHOCYTES # BLD AUTO: 0.61 K/UL — LOW (ref 1.2–3.4)
LYMPHOCYTES # BLD AUTO: 0.63 K/UL — LOW (ref 1.2–3.4)
LYMPHOCYTES # BLD AUTO: 0.67 K/UL — LOW (ref 1.2–3.4)
LYMPHOCYTES # BLD AUTO: 0.72 K/UL — LOW (ref 1.2–3.4)
LYMPHOCYTES # BLD AUTO: 0.75 K/UL — LOW (ref 1.2–3.4)
LYMPHOCYTES # BLD AUTO: 0.9 % — LOW (ref 20.5–51.1)
LYMPHOCYTES # BLD AUTO: 0.98 K/UL — LOW (ref 1.2–3.4)
LYMPHOCYTES # BLD AUTO: 1 % — LOW (ref 20.5–51.1)
LYMPHOCYTES # BLD AUTO: 1 K/UL — LOW (ref 1.2–3.4)
LYMPHOCYTES # BLD AUTO: 1.2 % — LOW (ref 20.5–51.1)
LYMPHOCYTES # BLD AUTO: 1.35 K/UL — SIGNIFICANT CHANGE UP (ref 1.2–3.4)
LYMPHOCYTES # BLD AUTO: 1.4 % — LOW (ref 20.5–51.1)
LYMPHOCYTES # BLD AUTO: 1.5 % — LOW (ref 20.5–51.1)
LYMPHOCYTES # BLD AUTO: 1.6 % — LOW (ref 20.5–51.1)
LYMPHOCYTES # BLD AUTO: 1.6 % — LOW (ref 20.5–51.1)
LYMPHOCYTES # BLD AUTO: 1.8 % — LOW (ref 20.5–51.1)
LYMPHOCYTES # BLD AUTO: 1.8 % — LOW (ref 20.5–51.1)
LYMPHOCYTES # BLD AUTO: 1.87 K/UL — SIGNIFICANT CHANGE UP (ref 1.2–3.4)
LYMPHOCYTES # BLD AUTO: 1.9 % — LOW (ref 20.5–51.1)
LYMPHOCYTES # BLD AUTO: 10.8 % — LOW (ref 20.5–51.1)
LYMPHOCYTES # BLD AUTO: 11 % — LOW (ref 20.5–51.1)
LYMPHOCYTES # BLD AUTO: 11.4 % — LOW (ref 20.5–51.1)
LYMPHOCYTES # BLD AUTO: 11.7 % — LOW (ref 20.5–51.1)
LYMPHOCYTES # BLD AUTO: 12 % — LOW (ref 20.5–51.1)
LYMPHOCYTES # BLD AUTO: 13.2 % — LOW (ref 20.5–51.1)
LYMPHOCYTES # BLD AUTO: 13.5 % — LOW (ref 20.5–51.1)
LYMPHOCYTES # BLD AUTO: 14 % — LOW (ref 20.5–51.1)
LYMPHOCYTES # BLD AUTO: 15.4 % — LOW (ref 20.5–51.1)
LYMPHOCYTES # BLD AUTO: 2 % — LOW (ref 20.5–51.1)
LYMPHOCYTES # BLD AUTO: 2.03 K/UL — SIGNIFICANT CHANGE UP (ref 1.2–3.4)
LYMPHOCYTES # BLD AUTO: 2.1 % — LOW (ref 20.5–51.1)
LYMPHOCYTES # BLD AUTO: 2.2 % — LOW (ref 20.5–51.1)
LYMPHOCYTES # BLD AUTO: 2.2 % — LOW (ref 20.5–51.1)
LYMPHOCYTES # BLD AUTO: 2.34 K/UL — SIGNIFICANT CHANGE UP (ref 1.2–3.4)
LYMPHOCYTES # BLD AUTO: 2.5 % — LOW (ref 20.5–51.1)
LYMPHOCYTES # BLD AUTO: 2.6 % — LOW (ref 20.5–51.1)
LYMPHOCYTES # BLD AUTO: 2.9 % — LOW (ref 20.5–51.1)
LYMPHOCYTES # BLD AUTO: 20.7 % — SIGNIFICANT CHANGE UP (ref 20.5–51.1)
LYMPHOCYTES # BLD AUTO: 21.2 % — SIGNIFICANT CHANGE UP (ref 20.5–51.1)
LYMPHOCYTES # BLD AUTO: 24.2 % — SIGNIFICANT CHANGE UP (ref 20.5–51.1)
LYMPHOCYTES # BLD AUTO: 28.5 % — SIGNIFICANT CHANGE UP (ref 20.5–51.1)
LYMPHOCYTES # BLD AUTO: 28.8 % — SIGNIFICANT CHANGE UP (ref 20.5–51.1)
LYMPHOCYTES # BLD AUTO: 3.14 K/UL — SIGNIFICANT CHANGE UP (ref 1.2–3.4)
LYMPHOCYTES # BLD AUTO: 3.22 K/UL — SIGNIFICANT CHANGE UP (ref 1.2–3.4)
LYMPHOCYTES # BLD AUTO: 3.24 K/UL — SIGNIFICANT CHANGE UP (ref 1.2–3.4)
LYMPHOCYTES # BLD AUTO: 3.5 % — LOW (ref 20.5–51.1)
LYMPHOCYTES # BLD AUTO: 3.6 % — LOW (ref 20.5–51.1)
LYMPHOCYTES # BLD AUTO: 3.7 % — LOW (ref 20.5–51.1)
LYMPHOCYTES # BLD AUTO: 3.7 % — LOW (ref 20.5–51.1)
LYMPHOCYTES # BLD AUTO: 3.96 K/UL — HIGH (ref 1.2–3.4)
LYMPHOCYTES # BLD AUTO: 30.7 % — SIGNIFICANT CHANGE UP (ref 20.5–51.1)
LYMPHOCYTES # BLD AUTO: 35.8 % — SIGNIFICANT CHANGE UP (ref 20.5–51.1)
LYMPHOCYTES # BLD AUTO: 5.3 % — LOW (ref 20.5–51.1)
LYMPHOCYTES # BLD AUTO: 6.8 % — LOW (ref 20.5–51.1)
LYMPHOCYTES # BLD AUTO: 7.8 % — LOW (ref 20.5–51.1)
LYMPHOCYTES # BLD AUTO: 8 % — LOW (ref 20.5–51.1)
LYMPHOCYTES # BLD AUTO: 8.5 % — LOW (ref 20.5–51.1)
LYMPHOCYTES # BLD AUTO: 8.6 % — LOW (ref 20.5–51.1)
LYMPHOCYTES # BLD AUTO: 9.2 % — LOW (ref 20.5–51.1)
LYMPHOCYTES # FLD: SIGNIFICANT CHANGE UP
M PNEUMO IGG SER IA-ACNC: 1.05 INDEX — HIGH
M PNEUMO IGG SER IA-ACNC: ABNORMAL
M PNEUMO IGM SER-ACNC: <20 UNITS/ML — SIGNIFICANT CHANGE UP
M TB IFN-G BLD-IMP: ABNORMAL
M TB IFN-G CD4+ BCKGRND COR BLD-ACNC: 0 IU/ML — SIGNIFICANT CHANGE UP
M TB IFN-G CD4+CD8+ BCKGRND COR BLD-ACNC: 0 IU/ML — SIGNIFICANT CHANGE UP
MAGNESIUM SERPL-MCNC: 1.4 MG/DL — LOW (ref 1.8–2.4)
MAGNESIUM SERPL-MCNC: 1.4 MG/DL — LOW (ref 1.8–2.4)
MAGNESIUM SERPL-MCNC: 1.5 MG/DL — LOW (ref 1.8–2.4)
MAGNESIUM SERPL-MCNC: 1.5 MG/DL — LOW (ref 1.8–2.4)
MAGNESIUM SERPL-MCNC: 1.6 MG/DL — LOW (ref 1.8–2.4)
MAGNESIUM SERPL-MCNC: 1.6 MG/DL — LOW (ref 1.8–2.4)
MAGNESIUM SERPL-MCNC: 1.7 MG/DL — LOW (ref 1.8–2.4)
MAGNESIUM SERPL-MCNC: 1.7 MG/DL — LOW (ref 1.8–2.4)
MAGNESIUM SERPL-MCNC: 1.8 MG/DL — SIGNIFICANT CHANGE UP (ref 1.8–2.4)
MAGNESIUM SERPL-MCNC: 1.9 MG/DL — SIGNIFICANT CHANGE UP (ref 1.8–2.4)
MAGNESIUM SERPL-MCNC: 2 MG/DL — SIGNIFICANT CHANGE UP (ref 1.8–2.4)
MAGNESIUM SERPL-MCNC: 2.1 MG/DL — SIGNIFICANT CHANGE UP (ref 1.8–2.4)
MAGNESIUM SERPL-MCNC: 2.3 MG/DL — SIGNIFICANT CHANGE UP (ref 1.8–2.4)
MAGNESIUM SERPL-MCNC: 2.4 MG/DL — SIGNIFICANT CHANGE UP (ref 1.8–2.4)
MAGNESIUM SERPL-MCNC: 2.5 MG/DL — HIGH (ref 1.8–2.4)
MAGNESIUM SERPL-MCNC: 2.5 MG/DL — HIGH (ref 1.8–2.4)
MAGNESIUM SERPL-MCNC: 2.6 MG/DL — HIGH (ref 1.8–2.4)
MAGNESIUM SERPL-MCNC: 2.7 MG/DL — HIGH (ref 1.8–2.4)
MAGNESIUM SERPL-MCNC: 2.7 MG/DL — HIGH (ref 1.8–2.4)
MAGNESIUM SERPL-MCNC: 3.1 MG/DL — CRITICAL HIGH (ref 1.8–2.4)
MANUAL SMEAR VERIFICATION: SIGNIFICANT CHANGE UP
MCHC RBC-ENTMCNC: 26.4 PG — LOW (ref 27–31)
MCHC RBC-ENTMCNC: 26.5 PG — LOW (ref 27–31)
MCHC RBC-ENTMCNC: 26.7 PG — LOW (ref 27–31)
MCHC RBC-ENTMCNC: 26.8 PG — LOW (ref 27–31)
MCHC RBC-ENTMCNC: 26.9 PG — LOW (ref 27–31)
MCHC RBC-ENTMCNC: 27 PG — SIGNIFICANT CHANGE UP (ref 27–31)
MCHC RBC-ENTMCNC: 27 PG — SIGNIFICANT CHANGE UP (ref 27–31)
MCHC RBC-ENTMCNC: 27.1 PG — SIGNIFICANT CHANGE UP (ref 27–31)
MCHC RBC-ENTMCNC: 27.2 PG — SIGNIFICANT CHANGE UP (ref 27–31)
MCHC RBC-ENTMCNC: 27.4 PG — SIGNIFICANT CHANGE UP (ref 27–31)
MCHC RBC-ENTMCNC: 27.5 PG — SIGNIFICANT CHANGE UP (ref 27–31)
MCHC RBC-ENTMCNC: 27.6 PG — SIGNIFICANT CHANGE UP (ref 27–31)
MCHC RBC-ENTMCNC: 27.7 PG — SIGNIFICANT CHANGE UP (ref 27–31)
MCHC RBC-ENTMCNC: 27.8 PG — SIGNIFICANT CHANGE UP (ref 27–31)
MCHC RBC-ENTMCNC: 27.8 PG — SIGNIFICANT CHANGE UP (ref 27–31)
MCHC RBC-ENTMCNC: 27.9 PG — SIGNIFICANT CHANGE UP (ref 27–31)
MCHC RBC-ENTMCNC: 27.9 PG — SIGNIFICANT CHANGE UP (ref 27–31)
MCHC RBC-ENTMCNC: 28 PG — SIGNIFICANT CHANGE UP (ref 27–31)
MCHC RBC-ENTMCNC: 28.1 PG — SIGNIFICANT CHANGE UP (ref 27–31)
MCHC RBC-ENTMCNC: 28.2 PG — SIGNIFICANT CHANGE UP (ref 27–31)
MCHC RBC-ENTMCNC: 28.3 PG — SIGNIFICANT CHANGE UP (ref 27–31)
MCHC RBC-ENTMCNC: 28.4 PG — SIGNIFICANT CHANGE UP (ref 27–31)
MCHC RBC-ENTMCNC: 28.4 PG — SIGNIFICANT CHANGE UP (ref 27–31)
MCHC RBC-ENTMCNC: 28.5 PG — SIGNIFICANT CHANGE UP (ref 27–31)
MCHC RBC-ENTMCNC: 28.5 PG — SIGNIFICANT CHANGE UP (ref 27–31)
MCHC RBC-ENTMCNC: 28.6 PG — SIGNIFICANT CHANGE UP (ref 27–31)
MCHC RBC-ENTMCNC: 28.7 PG — SIGNIFICANT CHANGE UP (ref 27–31)
MCHC RBC-ENTMCNC: 28.8 PG — SIGNIFICANT CHANGE UP (ref 27–31)
MCHC RBC-ENTMCNC: 28.9 PG — SIGNIFICANT CHANGE UP (ref 27–31)
MCHC RBC-ENTMCNC: 28.9 PG — SIGNIFICANT CHANGE UP (ref 27–31)
MCHC RBC-ENTMCNC: 29 PG — SIGNIFICANT CHANGE UP (ref 27–31)
MCHC RBC-ENTMCNC: 29.1 PG — SIGNIFICANT CHANGE UP (ref 27–31)
MCHC RBC-ENTMCNC: 29.2 PG — SIGNIFICANT CHANGE UP (ref 27–31)
MCHC RBC-ENTMCNC: 29.3 PG — SIGNIFICANT CHANGE UP (ref 27–31)
MCHC RBC-ENTMCNC: 29.3 PG — SIGNIFICANT CHANGE UP (ref 27–31)
MCHC RBC-ENTMCNC: 29.4 PG — SIGNIFICANT CHANGE UP (ref 27–31)
MCHC RBC-ENTMCNC: 29.4 PG — SIGNIFICANT CHANGE UP (ref 27–31)
MCHC RBC-ENTMCNC: 29.5 PG — SIGNIFICANT CHANGE UP (ref 27–31)
MCHC RBC-ENTMCNC: 29.7 PG — SIGNIFICANT CHANGE UP (ref 27–31)
MCHC RBC-ENTMCNC: 29.7 PG — SIGNIFICANT CHANGE UP (ref 27–31)
MCHC RBC-ENTMCNC: 29.8 PG — SIGNIFICANT CHANGE UP (ref 27–31)
MCHC RBC-ENTMCNC: 29.9 PG — SIGNIFICANT CHANGE UP (ref 27–31)
MCHC RBC-ENTMCNC: 30.1 PG — SIGNIFICANT CHANGE UP (ref 27–31)
MCHC RBC-ENTMCNC: 30.1 PG — SIGNIFICANT CHANGE UP (ref 27–31)
MCHC RBC-ENTMCNC: 30.4 G/DL — LOW (ref 32–37)
MCHC RBC-ENTMCNC: 30.5 G/DL — LOW (ref 32–37)
MCHC RBC-ENTMCNC: 30.6 G/DL — LOW (ref 32–37)
MCHC RBC-ENTMCNC: 30.7 G/DL — LOW (ref 32–37)
MCHC RBC-ENTMCNC: 30.7 G/DL — LOW (ref 32–37)
MCHC RBC-ENTMCNC: 30.8 G/DL — LOW (ref 32–37)
MCHC RBC-ENTMCNC: 30.9 G/DL — LOW (ref 32–37)
MCHC RBC-ENTMCNC: 30.9 G/DL — LOW (ref 32–37)
MCHC RBC-ENTMCNC: 31.2 G/DL — LOW (ref 32–37)
MCHC RBC-ENTMCNC: 31.2 G/DL — LOW (ref 32–37)
MCHC RBC-ENTMCNC: 31.3 G/DL — LOW (ref 32–37)
MCHC RBC-ENTMCNC: 31.3 G/DL — LOW (ref 32–37)
MCHC RBC-ENTMCNC: 31.4 G/DL — LOW (ref 32–37)
MCHC RBC-ENTMCNC: 31.5 G/DL — LOW (ref 32–37)
MCHC RBC-ENTMCNC: 31.5 G/DL — LOW (ref 32–37)
MCHC RBC-ENTMCNC: 31.6 G/DL — LOW (ref 32–37)
MCHC RBC-ENTMCNC: 31.7 G/DL — LOW (ref 32–37)
MCHC RBC-ENTMCNC: 31.8 G/DL — LOW (ref 32–37)
MCHC RBC-ENTMCNC: 31.8 G/DL — LOW (ref 32–37)
MCHC RBC-ENTMCNC: 31.9 G/DL — LOW (ref 32–37)
MCHC RBC-ENTMCNC: 32 G/DL — SIGNIFICANT CHANGE UP (ref 32–37)
MCHC RBC-ENTMCNC: 32.1 G/DL — SIGNIFICANT CHANGE UP (ref 32–37)
MCHC RBC-ENTMCNC: 32.1 G/DL — SIGNIFICANT CHANGE UP (ref 32–37)
MCHC RBC-ENTMCNC: 32.2 G/DL — SIGNIFICANT CHANGE UP (ref 32–37)
MCHC RBC-ENTMCNC: 32.2 G/DL — SIGNIFICANT CHANGE UP (ref 32–37)
MCHC RBC-ENTMCNC: 32.3 G/DL — SIGNIFICANT CHANGE UP (ref 32–37)
MCHC RBC-ENTMCNC: 32.3 G/DL — SIGNIFICANT CHANGE UP (ref 32–37)
MCHC RBC-ENTMCNC: 32.4 G/DL — SIGNIFICANT CHANGE UP (ref 32–37)
MCHC RBC-ENTMCNC: 32.5 G/DL — SIGNIFICANT CHANGE UP (ref 32–37)
MCHC RBC-ENTMCNC: 32.6 G/DL — SIGNIFICANT CHANGE UP (ref 32–37)
MCHC RBC-ENTMCNC: 32.7 G/DL — SIGNIFICANT CHANGE UP (ref 32–37)
MCHC RBC-ENTMCNC: 32.7 G/DL — SIGNIFICANT CHANGE UP (ref 32–37)
MCHC RBC-ENTMCNC: 32.8 G/DL — SIGNIFICANT CHANGE UP (ref 32–37)
MCHC RBC-ENTMCNC: 32.8 G/DL — SIGNIFICANT CHANGE UP (ref 32–37)
MCHC RBC-ENTMCNC: 32.9 G/DL — SIGNIFICANT CHANGE UP (ref 32–37)
MCHC RBC-ENTMCNC: 33 G/DL — SIGNIFICANT CHANGE UP (ref 32–37)
MCHC RBC-ENTMCNC: 33 G/DL — SIGNIFICANT CHANGE UP (ref 32–37)
MCHC RBC-ENTMCNC: 33.2 G/DL — SIGNIFICANT CHANGE UP (ref 32–37)
MCHC RBC-ENTMCNC: 33.3 G/DL — SIGNIFICANT CHANGE UP (ref 32–37)
MCHC RBC-ENTMCNC: 33.3 G/DL — SIGNIFICANT CHANGE UP (ref 32–37)
MCHC RBC-ENTMCNC: 33.6 G/DL — SIGNIFICANT CHANGE UP (ref 32–37)
MCHC RBC-ENTMCNC: 33.8 G/DL — SIGNIFICANT CHANGE UP (ref 32–37)
MCHC RBC-ENTMCNC: 33.9 G/DL — SIGNIFICANT CHANGE UP (ref 32–37)
MCHC RBC-ENTMCNC: 34 G/DL — SIGNIFICANT CHANGE UP (ref 32–37)
MCHC RBC-ENTMCNC: 34.2 G/DL — SIGNIFICANT CHANGE UP (ref 32–37)
MCHC RBC-ENTMCNC: 34.2 G/DL — SIGNIFICANT CHANGE UP (ref 32–37)
MCHC RBC-ENTMCNC: 34.5 G/DL — SIGNIFICANT CHANGE UP (ref 32–37)
MCV RBC AUTO: 81.2 FL — SIGNIFICANT CHANGE UP (ref 81–99)
MCV RBC AUTO: 81.5 FL — SIGNIFICANT CHANGE UP (ref 81–99)
MCV RBC AUTO: 82.6 FL — SIGNIFICANT CHANGE UP (ref 81–99)
MCV RBC AUTO: 82.6 FL — SIGNIFICANT CHANGE UP (ref 81–99)
MCV RBC AUTO: 83 FL — SIGNIFICANT CHANGE UP (ref 81–99)
MCV RBC AUTO: 83.2 FL — SIGNIFICANT CHANGE UP (ref 81–99)
MCV RBC AUTO: 83.5 FL — SIGNIFICANT CHANGE UP (ref 81–99)
MCV RBC AUTO: 83.6 FL — SIGNIFICANT CHANGE UP (ref 81–99)
MCV RBC AUTO: 84 FL — SIGNIFICANT CHANGE UP (ref 81–99)
MCV RBC AUTO: 84.3 FL — SIGNIFICANT CHANGE UP (ref 81–99)
MCV RBC AUTO: 84.6 FL — SIGNIFICANT CHANGE UP (ref 81–99)
MCV RBC AUTO: 84.8 FL — SIGNIFICANT CHANGE UP (ref 81–99)
MCV RBC AUTO: 85 FL — SIGNIFICANT CHANGE UP (ref 81–99)
MCV RBC AUTO: 86.1 FL — SIGNIFICANT CHANGE UP (ref 81–99)
MCV RBC AUTO: 86.1 FL — SIGNIFICANT CHANGE UP (ref 81–99)
MCV RBC AUTO: 86.2 FL — SIGNIFICANT CHANGE UP (ref 81–99)
MCV RBC AUTO: 86.3 FL — SIGNIFICANT CHANGE UP (ref 81–99)
MCV RBC AUTO: 86.4 FL — SIGNIFICANT CHANGE UP (ref 81–99)
MCV RBC AUTO: 86.5 FL — SIGNIFICANT CHANGE UP (ref 81–99)
MCV RBC AUTO: 86.8 FL — SIGNIFICANT CHANGE UP (ref 81–99)
MCV RBC AUTO: 86.8 FL — SIGNIFICANT CHANGE UP (ref 81–99)
MCV RBC AUTO: 86.9 FL — SIGNIFICANT CHANGE UP (ref 81–99)
MCV RBC AUTO: 87 FL — SIGNIFICANT CHANGE UP (ref 81–99)
MCV RBC AUTO: 87.1 FL — SIGNIFICANT CHANGE UP (ref 81–99)
MCV RBC AUTO: 87.2 FL — SIGNIFICANT CHANGE UP (ref 81–99)
MCV RBC AUTO: 87.2 FL — SIGNIFICANT CHANGE UP (ref 81–99)
MCV RBC AUTO: 87.3 FL — SIGNIFICANT CHANGE UP (ref 81–99)
MCV RBC AUTO: 87.4 FL — SIGNIFICANT CHANGE UP (ref 81–99)
MCV RBC AUTO: 87.5 FL — SIGNIFICANT CHANGE UP (ref 81–99)
MCV RBC AUTO: 87.5 FL — SIGNIFICANT CHANGE UP (ref 81–99)
MCV RBC AUTO: 87.6 FL — SIGNIFICANT CHANGE UP (ref 81–99)
MCV RBC AUTO: 87.7 FL — SIGNIFICANT CHANGE UP (ref 81–99)
MCV RBC AUTO: 88 FL — SIGNIFICANT CHANGE UP (ref 81–99)
MCV RBC AUTO: 88 FL — SIGNIFICANT CHANGE UP (ref 81–99)
MCV RBC AUTO: 88.1 FL — SIGNIFICANT CHANGE UP (ref 81–99)
MCV RBC AUTO: 88.3 FL — SIGNIFICANT CHANGE UP (ref 81–99)
MCV RBC AUTO: 88.4 FL — SIGNIFICANT CHANGE UP (ref 81–99)
MCV RBC AUTO: 88.5 FL — SIGNIFICANT CHANGE UP (ref 81–99)
MCV RBC AUTO: 88.5 FL — SIGNIFICANT CHANGE UP (ref 81–99)
MCV RBC AUTO: 88.6 FL — SIGNIFICANT CHANGE UP (ref 81–99)
MCV RBC AUTO: 88.7 FL — SIGNIFICANT CHANGE UP (ref 81–99)
MCV RBC AUTO: 88.8 FL — SIGNIFICANT CHANGE UP (ref 81–99)
MCV RBC AUTO: 89 FL — SIGNIFICANT CHANGE UP (ref 81–99)
MCV RBC AUTO: 89.1 FL — SIGNIFICANT CHANGE UP (ref 81–99)
MCV RBC AUTO: 89.1 FL — SIGNIFICANT CHANGE UP (ref 81–99)
MCV RBC AUTO: 89.2 FL — SIGNIFICANT CHANGE UP (ref 81–99)
MCV RBC AUTO: 89.6 FL — SIGNIFICANT CHANGE UP (ref 81–99)
MCV RBC AUTO: 89.6 FL — SIGNIFICANT CHANGE UP (ref 81–99)
MCV RBC AUTO: 89.7 FL — SIGNIFICANT CHANGE UP (ref 81–99)
MCV RBC AUTO: 90.1 FL — SIGNIFICANT CHANGE UP (ref 81–99)
MCV RBC AUTO: 90.3 FL — SIGNIFICANT CHANGE UP (ref 81–99)
MCV RBC AUTO: 90.4 FL — SIGNIFICANT CHANGE UP (ref 81–99)
MCV RBC AUTO: 90.5 FL — SIGNIFICANT CHANGE UP (ref 81–99)
MCV RBC AUTO: 91.2 FL — SIGNIFICANT CHANGE UP (ref 81–99)
MCV RBC AUTO: 92.2 FL — SIGNIFICANT CHANGE UP (ref 81–99)
MCV RBC AUTO: 92.5 FL — SIGNIFICANT CHANGE UP (ref 81–99)
MCV RBC AUTO: 92.6 FL — SIGNIFICANT CHANGE UP (ref 81–99)
MESOTHL CELL # FLD: SIGNIFICANT CHANGE UP %
METHOD TYPE: SIGNIFICANT CHANGE UP
MICROCYTES BLD QL: SIGNIFICANT CHANGE UP
MICROCYTES BLD QL: SLIGHT — SIGNIFICANT CHANGE UP
MONOCYTES # BLD AUTO: 0 K/UL — LOW (ref 0.1–0.6)
MONOCYTES # BLD AUTO: 0 K/UL — LOW (ref 0.1–0.6)
MONOCYTES # BLD AUTO: 0.08 K/UL — LOW (ref 0.1–0.6)
MONOCYTES # BLD AUTO: 0.08 K/UL — LOW (ref 0.1–0.6)
MONOCYTES # BLD AUTO: 0.09 K/UL — LOW (ref 0.1–0.6)
MONOCYTES # BLD AUTO: 0.1 K/UL — SIGNIFICANT CHANGE UP (ref 0.1–0.6)
MONOCYTES # BLD AUTO: 0.11 K/UL — SIGNIFICANT CHANGE UP (ref 0.1–0.6)
MONOCYTES # BLD AUTO: 0.11 K/UL — SIGNIFICANT CHANGE UP (ref 0.1–0.6)
MONOCYTES # BLD AUTO: 0.12 K/UL — SIGNIFICANT CHANGE UP (ref 0.1–0.6)
MONOCYTES # BLD AUTO: 0.12 K/UL — SIGNIFICANT CHANGE UP (ref 0.1–0.6)
MONOCYTES # BLD AUTO: 0.14 K/UL — SIGNIFICANT CHANGE UP (ref 0.1–0.6)
MONOCYTES # BLD AUTO: 0.15 K/UL — SIGNIFICANT CHANGE UP (ref 0.1–0.6)
MONOCYTES # BLD AUTO: 0.17 K/UL — SIGNIFICANT CHANGE UP (ref 0.1–0.6)
MONOCYTES # BLD AUTO: 0.18 K/UL — SIGNIFICANT CHANGE UP (ref 0.1–0.6)
MONOCYTES # BLD AUTO: 0.19 K/UL — SIGNIFICANT CHANGE UP (ref 0.1–0.6)
MONOCYTES # BLD AUTO: 0.2 K/UL — SIGNIFICANT CHANGE UP (ref 0.1–0.6)
MONOCYTES # BLD AUTO: 0.21 K/UL — SIGNIFICANT CHANGE UP (ref 0.1–0.6)
MONOCYTES # BLD AUTO: 0.21 K/UL — SIGNIFICANT CHANGE UP (ref 0.1–0.6)
MONOCYTES # BLD AUTO: 0.22 K/UL — SIGNIFICANT CHANGE UP (ref 0.1–0.6)
MONOCYTES # BLD AUTO: 0.24 K/UL — SIGNIFICANT CHANGE UP (ref 0.1–0.6)
MONOCYTES # BLD AUTO: 0.26 K/UL — SIGNIFICANT CHANGE UP (ref 0.1–0.6)
MONOCYTES # BLD AUTO: 0.26 K/UL — SIGNIFICANT CHANGE UP (ref 0.1–0.6)
MONOCYTES # BLD AUTO: 0.27 K/UL — SIGNIFICANT CHANGE UP (ref 0.1–0.6)
MONOCYTES # BLD AUTO: 0.29 K/UL — SIGNIFICANT CHANGE UP (ref 0.1–0.6)
MONOCYTES # BLD AUTO: 0.3 K/UL — SIGNIFICANT CHANGE UP (ref 0.1–0.6)
MONOCYTES # BLD AUTO: 0.35 K/UL — SIGNIFICANT CHANGE UP (ref 0.1–0.6)
MONOCYTES # BLD AUTO: 0.36 K/UL — SIGNIFICANT CHANGE UP (ref 0.1–0.6)
MONOCYTES # BLD AUTO: 0.37 K/UL — SIGNIFICANT CHANGE UP (ref 0.1–0.6)
MONOCYTES # BLD AUTO: 0.38 K/UL — SIGNIFICANT CHANGE UP (ref 0.1–0.6)
MONOCYTES # BLD AUTO: 0.39 K/UL — SIGNIFICANT CHANGE UP (ref 0.1–0.6)
MONOCYTES # BLD AUTO: 0.39 K/UL — SIGNIFICANT CHANGE UP (ref 0.1–0.6)
MONOCYTES # BLD AUTO: 0.42 K/UL — SIGNIFICANT CHANGE UP (ref 0.1–0.6)
MONOCYTES # BLD AUTO: 0.43 K/UL — SIGNIFICANT CHANGE UP (ref 0.1–0.6)
MONOCYTES # BLD AUTO: 0.46 K/UL — SIGNIFICANT CHANGE UP (ref 0.1–0.6)
MONOCYTES # BLD AUTO: 0.48 K/UL — SIGNIFICANT CHANGE UP (ref 0.1–0.6)
MONOCYTES # BLD AUTO: 0.58 K/UL — SIGNIFICANT CHANGE UP (ref 0.1–0.6)
MONOCYTES # BLD AUTO: 0.58 K/UL — SIGNIFICANT CHANGE UP (ref 0.1–0.6)
MONOCYTES # BLD AUTO: 0.59 K/UL — SIGNIFICANT CHANGE UP (ref 0.1–0.6)
MONOCYTES # BLD AUTO: 0.68 K/UL — HIGH (ref 0.1–0.6)
MONOCYTES # BLD AUTO: 0.74 K/UL — HIGH (ref 0.1–0.6)
MONOCYTES # BLD AUTO: 0.87 K/UL — HIGH (ref 0.1–0.6)
MONOCYTES # BLD AUTO: 0.88 K/UL — HIGH (ref 0.1–0.6)
MONOCYTES NFR BLD AUTO: 0 % — LOW (ref 1.7–9.3)
MONOCYTES NFR BLD AUTO: 0 % — LOW (ref 1.7–9.3)
MONOCYTES NFR BLD AUTO: 0.6 % — LOW (ref 1.7–9.3)
MONOCYTES NFR BLD AUTO: 0.6 % — LOW (ref 1.7–9.3)
MONOCYTES NFR BLD AUTO: 0.9 % — LOW (ref 1.7–9.3)
MONOCYTES NFR BLD AUTO: 1 % — LOW (ref 1.7–9.3)
MONOCYTES NFR BLD AUTO: 1.2 % — LOW (ref 1.7–9.3)
MONOCYTES NFR BLD AUTO: 1.3 % — LOW (ref 1.7–9.3)
MONOCYTES NFR BLD AUTO: 1.4 % — LOW (ref 1.7–9.3)
MONOCYTES NFR BLD AUTO: 1.6 % — LOW (ref 1.7–9.3)
MONOCYTES NFR BLD AUTO: 1.7 % — SIGNIFICANT CHANGE UP (ref 1.7–9.3)
MONOCYTES NFR BLD AUTO: 1.8 % — SIGNIFICANT CHANGE UP (ref 1.7–9.3)
MONOCYTES NFR BLD AUTO: 1.9 % — SIGNIFICANT CHANGE UP (ref 1.7–9.3)
MONOCYTES NFR BLD AUTO: 2 % — SIGNIFICANT CHANGE UP (ref 1.7–9.3)
MONOCYTES NFR BLD AUTO: 2 % — SIGNIFICANT CHANGE UP (ref 1.7–9.3)
MONOCYTES NFR BLD AUTO: 2.1 % — SIGNIFICANT CHANGE UP (ref 1.7–9.3)
MONOCYTES NFR BLD AUTO: 2.3 % — SIGNIFICANT CHANGE UP (ref 1.7–9.3)
MONOCYTES NFR BLD AUTO: 2.3 % — SIGNIFICANT CHANGE UP (ref 1.7–9.3)
MONOCYTES NFR BLD AUTO: 2.4 % — SIGNIFICANT CHANGE UP (ref 1.7–9.3)
MONOCYTES NFR BLD AUTO: 2.5 % — SIGNIFICANT CHANGE UP (ref 1.7–9.3)
MONOCYTES NFR BLD AUTO: 2.6 % — SIGNIFICANT CHANGE UP (ref 1.7–9.3)
MONOCYTES NFR BLD AUTO: 2.6 % — SIGNIFICANT CHANGE UP (ref 1.7–9.3)
MONOCYTES NFR BLD AUTO: 2.7 % — SIGNIFICANT CHANGE UP (ref 1.7–9.3)
MONOCYTES NFR BLD AUTO: 2.8 % — SIGNIFICANT CHANGE UP (ref 1.7–9.3)
MONOCYTES NFR BLD AUTO: 3 % — SIGNIFICANT CHANGE UP (ref 1.7–9.3)
MONOCYTES NFR BLD AUTO: 3.6 % — SIGNIFICANT CHANGE UP (ref 1.7–9.3)
MONOCYTES NFR BLD AUTO: 3.6 % — SIGNIFICANT CHANGE UP (ref 1.7–9.3)
MONOCYTES NFR BLD AUTO: 3.8 % — SIGNIFICANT CHANGE UP (ref 1.7–9.3)
MONOCYTES NFR BLD AUTO: 3.9 % — SIGNIFICANT CHANGE UP (ref 1.7–9.3)
MONOCYTES NFR BLD AUTO: 5.2 % — SIGNIFICANT CHANGE UP (ref 1.7–9.3)
MONOCYTES NFR BLD AUTO: 6.2 % — SIGNIFICANT CHANGE UP (ref 1.7–9.3)
MONOCYTES NFR BLD AUTO: 6.4 % — SIGNIFICANT CHANGE UP (ref 1.7–9.3)
MONOCYTES NFR BLD AUTO: 6.8 % — SIGNIFICANT CHANGE UP (ref 1.7–9.3)
MRSA PCR RESULT.: POSITIVE
MRSA PCR RESULT.: POSITIVE
MYCOPLASMA AG SPEC QL: NEGATIVE — SIGNIFICANT CHANGE UP
NEUTROPHILS # BLD AUTO: 1.72 K/UL — SIGNIFICANT CHANGE UP (ref 1.4–6.5)
NEUTROPHILS # BLD AUTO: 11.09 K/UL — HIGH (ref 1.4–6.5)
NEUTROPHILS # BLD AUTO: 12.92 K/UL — HIGH (ref 1.4–6.5)
NEUTROPHILS # BLD AUTO: 13.61 K/UL — HIGH (ref 1.4–6.5)
NEUTROPHILS # BLD AUTO: 13.74 K/UL — HIGH (ref 1.4–6.5)
NEUTROPHILS # BLD AUTO: 13.95 K/UL — HIGH (ref 1.4–6.5)
NEUTROPHILS # BLD AUTO: 14.48 K/UL — HIGH (ref 1.4–6.5)
NEUTROPHILS # BLD AUTO: 15.07 K/UL — HIGH (ref 1.4–6.5)
NEUTROPHILS # BLD AUTO: 15.19 K/UL — HIGH (ref 1.4–6.5)
NEUTROPHILS # BLD AUTO: 15.29 K/UL — HIGH (ref 1.4–6.5)
NEUTROPHILS # BLD AUTO: 15.82 K/UL — HIGH (ref 1.4–6.5)
NEUTROPHILS # BLD AUTO: 15.88 K/UL — HIGH (ref 1.4–6.5)
NEUTROPHILS # BLD AUTO: 16.39 K/UL — HIGH (ref 1.4–6.5)
NEUTROPHILS # BLD AUTO: 16.99 K/UL — HIGH (ref 1.4–6.5)
NEUTROPHILS # BLD AUTO: 17.75 K/UL — HIGH (ref 1.4–6.5)
NEUTROPHILS # BLD AUTO: 18.21 K/UL — HIGH (ref 1.4–6.5)
NEUTROPHILS # BLD AUTO: 18.39 K/UL — HIGH (ref 1.4–6.5)
NEUTROPHILS # BLD AUTO: 18.92 K/UL — HIGH (ref 1.4–6.5)
NEUTROPHILS # BLD AUTO: 19.18 K/UL — HIGH (ref 1.4–6.5)
NEUTROPHILS # BLD AUTO: 19.59 K/UL — HIGH (ref 1.4–6.5)
NEUTROPHILS # BLD AUTO: 19.93 K/UL — HIGH (ref 1.4–6.5)
NEUTROPHILS # BLD AUTO: 2.27 K/UL — SIGNIFICANT CHANGE UP (ref 1.4–6.5)
NEUTROPHILS # BLD AUTO: 2.93 K/UL — SIGNIFICANT CHANGE UP (ref 1.4–6.5)
NEUTROPHILS # BLD AUTO: 21.54 K/UL — HIGH (ref 1.4–6.5)
NEUTROPHILS # BLD AUTO: 21.59 K/UL — HIGH (ref 1.4–6.5)
NEUTROPHILS # BLD AUTO: 21.64 K/UL — HIGH (ref 1.4–6.5)
NEUTROPHILS # BLD AUTO: 23.41 K/UL — HIGH (ref 1.4–6.5)
NEUTROPHILS # BLD AUTO: 23.96 K/UL — HIGH (ref 1.4–6.5)
NEUTROPHILS # BLD AUTO: 24.04 K/UL — HIGH (ref 1.4–6.5)
NEUTROPHILS # BLD AUTO: 27.61 K/UL — HIGH (ref 1.4–6.5)
NEUTROPHILS # BLD AUTO: 3.09 K/UL — SIGNIFICANT CHANGE UP (ref 1.4–6.5)
NEUTROPHILS # BLD AUTO: 3.16 K/UL — SIGNIFICANT CHANGE UP (ref 1.4–6.5)
NEUTROPHILS # BLD AUTO: 3.29 K/UL — SIGNIFICANT CHANGE UP (ref 1.4–6.5)
NEUTROPHILS # BLD AUTO: 3.79 K/UL — SIGNIFICANT CHANGE UP (ref 1.4–6.5)
NEUTROPHILS # BLD AUTO: 3.93 K/UL — SIGNIFICANT CHANGE UP (ref 1.4–6.5)
NEUTROPHILS # BLD AUTO: 3.99 K/UL — SIGNIFICANT CHANGE UP (ref 1.4–6.5)
NEUTROPHILS # BLD AUTO: 4.06 K/UL — SIGNIFICANT CHANGE UP (ref 1.4–6.5)
NEUTROPHILS # BLD AUTO: 4.12 K/UL — SIGNIFICANT CHANGE UP (ref 1.4–6.5)
NEUTROPHILS # BLD AUTO: 4.36 K/UL — SIGNIFICANT CHANGE UP (ref 1.4–6.5)
NEUTROPHILS # BLD AUTO: 4.42 K/UL — SIGNIFICANT CHANGE UP (ref 1.4–6.5)
NEUTROPHILS # BLD AUTO: 4.93 K/UL — SIGNIFICANT CHANGE UP (ref 1.4–6.5)
NEUTROPHILS # BLD AUTO: 4.96 K/UL — SIGNIFICANT CHANGE UP (ref 1.4–6.5)
NEUTROPHILS # BLD AUTO: 5.13 K/UL — SIGNIFICANT CHANGE UP (ref 1.4–6.5)
NEUTROPHILS # BLD AUTO: 5.56 K/UL — SIGNIFICANT CHANGE UP (ref 1.4–6.5)
NEUTROPHILS # BLD AUTO: 5.61 K/UL — SIGNIFICANT CHANGE UP (ref 1.4–6.5)
NEUTROPHILS # BLD AUTO: 6.2 K/UL — SIGNIFICANT CHANGE UP (ref 1.4–6.5)
NEUTROPHILS # BLD AUTO: 6.76 K/UL — HIGH (ref 1.4–6.5)
NEUTROPHILS # BLD AUTO: 7.4 K/UL — HIGH (ref 1.4–6.5)
NEUTROPHILS # BLD AUTO: 7.72 K/UL — HIGH (ref 1.4–6.5)
NEUTROPHILS # BLD AUTO: 7.97 K/UL — HIGH (ref 1.4–6.5)
NEUTROPHILS # BLD AUTO: 8.42 K/UL — HIGH (ref 1.4–6.5)
NEUTROPHILS # BLD AUTO: 8.43 K/UL — HIGH (ref 1.4–6.5)
NEUTROPHILS # BLD AUTO: 9.57 K/UL — HIGH (ref 1.4–6.5)
NEUTROPHILS NFR BLD AUTO: 54.4 % — SIGNIFICANT CHANGE UP (ref 42.2–75.2)
NEUTROPHILS NFR BLD AUTO: 59.8 % — SIGNIFICANT CHANGE UP (ref 42.2–75.2)
NEUTROPHILS NFR BLD AUTO: 61.9 % — SIGNIFICANT CHANGE UP (ref 42.2–75.2)
NEUTROPHILS NFR BLD AUTO: 67.3 % — SIGNIFICANT CHANGE UP (ref 42.2–75.2)
NEUTROPHILS NFR BLD AUTO: 70.3 % — SIGNIFICANT CHANGE UP (ref 42.2–75.2)
NEUTROPHILS NFR BLD AUTO: 72.9 % — SIGNIFICANT CHANGE UP (ref 42.2–75.2)
NEUTROPHILS NFR BLD AUTO: 75.5 % — HIGH (ref 42.2–75.2)
NEUTROPHILS NFR BLD AUTO: 79.6 % — HIGH (ref 42.2–75.2)
NEUTROPHILS NFR BLD AUTO: 80.4 % — HIGH (ref 42.2–75.2)
NEUTROPHILS NFR BLD AUTO: 81.2 % — HIGH (ref 42.2–75.2)
NEUTROPHILS NFR BLD AUTO: 82.1 % — HIGH (ref 42.2–75.2)
NEUTROPHILS NFR BLD AUTO: 82.3 % — HIGH (ref 42.2–75.2)
NEUTROPHILS NFR BLD AUTO: 83.3 % — HIGH (ref 42.2–75.2)
NEUTROPHILS NFR BLD AUTO: 83.8 % — HIGH (ref 42.2–75.2)
NEUTROPHILS NFR BLD AUTO: 84.2 % — HIGH (ref 42.2–75.2)
NEUTROPHILS NFR BLD AUTO: 84.5 % — HIGH (ref 42.2–75.2)
NEUTROPHILS NFR BLD AUTO: 85.4 % — HIGH (ref 42.2–75.2)
NEUTROPHILS NFR BLD AUTO: 86.3 % — HIGH (ref 42.2–75.2)
NEUTROPHILS NFR BLD AUTO: 87.2 % — HIGH (ref 42.2–75.2)
NEUTROPHILS NFR BLD AUTO: 87.6 % — HIGH (ref 42.2–75.2)
NEUTROPHILS NFR BLD AUTO: 88.4 % — HIGH (ref 42.2–75.2)
NEUTROPHILS NFR BLD AUTO: 88.5 % — HIGH (ref 42.2–75.2)
NEUTROPHILS NFR BLD AUTO: 88.8 % — HIGH (ref 42.2–75.2)
NEUTROPHILS NFR BLD AUTO: 89.3 % — HIGH (ref 42.2–75.2)
NEUTROPHILS NFR BLD AUTO: 89.5 % — HIGH (ref 42.2–75.2)
NEUTROPHILS NFR BLD AUTO: 90.3 % — HIGH (ref 42.2–75.2)
NEUTROPHILS NFR BLD AUTO: 91.4 % — HIGH (ref 42.2–75.2)
NEUTROPHILS NFR BLD AUTO: 91.9 % — HIGH (ref 42.2–75.2)
NEUTROPHILS NFR BLD AUTO: 92.5 % — HIGH (ref 42.2–75.2)
NEUTROPHILS NFR BLD AUTO: 92.6 % — HIGH (ref 42.2–75.2)
NEUTROPHILS NFR BLD AUTO: 92.7 % — HIGH (ref 42.2–75.2)
NEUTROPHILS NFR BLD AUTO: 92.7 % — HIGH (ref 42.2–75.2)
NEUTROPHILS NFR BLD AUTO: 93.1 % — HIGH (ref 42.2–75.2)
NEUTROPHILS NFR BLD AUTO: 93.3 % — HIGH (ref 42.2–75.2)
NEUTROPHILS NFR BLD AUTO: 93.3 % — HIGH (ref 42.2–75.2)
NEUTROPHILS NFR BLD AUTO: 93.4 % — HIGH (ref 42.2–75.2)
NEUTROPHILS NFR BLD AUTO: 93.5 % — HIGH (ref 42.2–75.2)
NEUTROPHILS NFR BLD AUTO: 93.8 % — HIGH (ref 42.2–75.2)
NEUTROPHILS NFR BLD AUTO: 93.8 % — HIGH (ref 42.2–75.2)
NEUTROPHILS NFR BLD AUTO: 93.9 % — HIGH (ref 42.2–75.2)
NEUTROPHILS NFR BLD AUTO: 94.1 % — HIGH (ref 42.2–75.2)
NEUTROPHILS NFR BLD AUTO: 94.1 % — HIGH (ref 42.2–75.2)
NEUTROPHILS NFR BLD AUTO: 94.4 % — HIGH (ref 42.2–75.2)
NEUTROPHILS NFR BLD AUTO: 94.5 % — HIGH (ref 42.2–75.2)
NEUTROPHILS NFR BLD AUTO: 95 % — HIGH (ref 42.2–75.2)
NEUTROPHILS NFR BLD AUTO: 95.1 % — HIGH (ref 42.2–75.2)
NEUTROPHILS NFR BLD AUTO: 95.6 % — HIGH (ref 42.2–75.2)
NEUTROPHILS NFR BLD AUTO: 95.7 % — HIGH (ref 42.2–75.2)
NEUTROPHILS NFR BLD AUTO: 96.2 % — HIGH (ref 42.2–75.2)
NEUTROPHILS NFR BLD AUTO: 96.5 % — HIGH (ref 42.2–75.2)
NEUTROPHILS NFR BLD AUTO: 96.5 % — HIGH (ref 42.2–75.2)
NEUTROPHILS NFR BLD AUTO: 97.2 % — HIGH (ref 42.2–75.2)
NEUTROPHILS NFR BLD AUTO: 97.3 % — HIGH (ref 42.2–75.2)
NEUTS BAND # BLD: 0.9 % — SIGNIFICANT CHANGE UP (ref 0–6)
NEUTS BAND # BLD: 10.6 % — HIGH (ref 0–6)
NIGHT BLUE STAIN TISS: SIGNIFICANT CHANGE UP
NITRITE UR-MCNC: NEGATIVE — SIGNIFICANT CHANGE UP
NITRITE UR-MCNC: NEGATIVE — SIGNIFICANT CHANGE UP
NON-GYNECOLOGICAL CYTOLOGY STUDY: SIGNIFICANT CHANGE UP
NRBC # BLD: 0 /100 WBCS — SIGNIFICANT CHANGE UP (ref 0–0)
NRBC # BLD: 1 /100 WBCS — HIGH (ref 0–0)
NRBC # BLD: 1 /100 — HIGH (ref 0–0)
NRBC # BLD: 1 /100 — HIGH (ref 0–0)
NRBC # BLD: SIGNIFICANT CHANGE UP /100 WBCS (ref 0–0)
NRBC # BLD: SIGNIFICANT CHANGE UP /100 WBCS (ref 0–0)
NT-PROBNP SERPL-SCNC: 1007 PG/ML — HIGH (ref 0–300)
NT-PROBNP SERPL-SCNC: 345 PG/ML — HIGH (ref 0–300)
NT-PROBNP SERPL-SCNC: 722 PG/ML — HIGH (ref 0–300)
NT-PROBNP SERPL-SCNC: 7750 PG/ML — HIGH (ref 0–300)
ORGANISM # SPEC MICROSCOPIC CNT: SIGNIFICANT CHANGE UP
P-ANCA SER-ACNC: NEGATIVE — SIGNIFICANT CHANGE UP
P-ANCA SER-ACNC: NEGATIVE — SIGNIFICANT CHANGE UP
PCO2 BLDA: 36 MMHG — LOW (ref 38–42)
PCO2 BLDA: 36 MMHG — LOW (ref 38–42)
PCO2 BLDA: 37 MMHG — LOW (ref 38–42)
PCO2 BLDA: 38 MMHG — SIGNIFICANT CHANGE UP (ref 38–42)
PCO2 BLDA: 40 MMHG — SIGNIFICANT CHANGE UP (ref 38–42)
PCO2 BLDA: 40 MMHG — SIGNIFICANT CHANGE UP (ref 38–42)
PCO2 BLDA: 42 MMHG — SIGNIFICANT CHANGE UP (ref 38–42)
PCO2 BLDA: 44 MMHG — HIGH (ref 38–42)
PCO2 BLDA: 44 MMHG — HIGH (ref 38–42)
PCO2 BLDA: 45 MMHG — HIGH (ref 38–42)
PCO2 BLDA: 46 MMHG — HIGH (ref 38–42)
PCO2 BLDA: 48 MMHG — HIGH (ref 38–42)
PCO2 BLDA: 48 MMHG — HIGH (ref 38–42)
PCO2 BLDA: 49 MMHG — HIGH (ref 38–42)
PCO2 BLDA: 50 MMHG — HIGH (ref 38–42)
PCO2 BLDA: 52 MMHG — HIGH (ref 38–42)
PCO2 BLDA: 52 MMHG — HIGH (ref 38–42)
PCO2 BLDA: 53 MMHG — HIGH (ref 38–42)
PCO2 BLDA: 58 MMHG — HIGH (ref 38–42)
PCO2 BLDA: 61 MMHG — CRITICAL HIGH (ref 38–42)
PCO2 BLDV: 42 MMHG — SIGNIFICANT CHANGE UP (ref 41–51)
PH BLDA: 7.18 — CRITICAL LOW (ref 7.38–7.42)
PH BLDA: 7.21 — LOW (ref 7.38–7.42)
PH BLDA: 7.22 — LOW (ref 7.38–7.42)
PH BLDA: 7.25 — LOW (ref 7.38–7.42)
PH BLDA: 7.25 — LOW (ref 7.38–7.42)
PH BLDA: 7.26 — LOW (ref 7.38–7.42)
PH BLDA: 7.28 — LOW (ref 7.38–7.42)
PH BLDA: 7.29 — LOW (ref 7.38–7.42)
PH BLDA: 7.3 — LOW (ref 7.38–7.42)
PH BLDA: 7.32 — LOW (ref 7.38–7.42)
PH BLDA: 7.34 — LOW (ref 7.38–7.42)
PH BLDA: 7.34 — LOW (ref 7.38–7.42)
PH BLDA: 7.36 — LOW (ref 7.38–7.42)
PH BLDA: 7.37 — LOW (ref 7.38–7.42)
PH BLDA: 7.37 — LOW (ref 7.38–7.42)
PH BLDA: 7.38 — SIGNIFICANT CHANGE UP (ref 7.38–7.42)
PH BLDA: 7.39 — SIGNIFICANT CHANGE UP (ref 7.38–7.42)
PH BLDA: 7.4 — SIGNIFICANT CHANGE UP (ref 7.38–7.42)
PH BLDA: 7.41 — SIGNIFICANT CHANGE UP (ref 7.38–7.42)
PH BLDA: 7.42 — SIGNIFICANT CHANGE UP (ref 7.38–7.42)
PH BLDA: 7.43 — HIGH (ref 7.38–7.42)
PH BLDA: 7.43 — HIGH (ref 7.38–7.42)
PH BLDA: 7.44 — HIGH (ref 7.38–7.42)
PH BLDA: 7.45 — HIGH (ref 7.38–7.42)
PH BLDA: 7.45 — HIGH (ref 7.38–7.42)
PH BLDA: 7.48 — HIGH (ref 7.38–7.42)
PH BLDA: 7.48 — HIGH (ref 7.38–7.42)
PH BLDV: 7.41 — SIGNIFICANT CHANGE UP (ref 7.26–7.43)
PH UR: 6 — SIGNIFICANT CHANGE UP (ref 5–8)
PH UR: 6 — SIGNIFICANT CHANGE UP (ref 5–8)
PHOSPHATE SERPL-MCNC: 2.6 MG/DL — SIGNIFICANT CHANGE UP (ref 2.1–4.9)
PHOSPHATE SERPL-MCNC: 3.2 MG/DL — SIGNIFICANT CHANGE UP (ref 2.1–4.9)
PHOSPHATE SERPL-MCNC: 3.3 MG/DL — SIGNIFICANT CHANGE UP (ref 2.1–4.9)
PHOSPHATE SERPL-MCNC: 3.5 MG/DL — SIGNIFICANT CHANGE UP (ref 2.1–4.9)
PHOSPHATE SERPL-MCNC: 4.6 MG/DL — SIGNIFICANT CHANGE UP (ref 2.1–4.9)
PHOSPHATE SERPL-MCNC: 4.7 MG/DL — SIGNIFICANT CHANGE UP (ref 2.1–4.9)
PHOSPHATE SERPL-MCNC: 5.3 MG/DL — HIGH (ref 2.1–4.9)
PHOSPHATE SERPL-MCNC: 5.9 MG/DL — HIGH (ref 2.1–4.9)
PHOSPHATE SERPL-MCNC: 6 MG/DL — HIGH (ref 2.1–4.9)
PHOSPHATE SERPL-MCNC: 7.9 MG/DL — HIGH (ref 2.1–4.9)
PLAT MORPH BLD: NORMAL — SIGNIFICANT CHANGE UP
PLATELET # BLD AUTO: 101 K/UL — LOW (ref 130–400)
PLATELET # BLD AUTO: 101 K/UL — LOW (ref 130–400)
PLATELET # BLD AUTO: 102 K/UL — LOW (ref 130–400)
PLATELET # BLD AUTO: 104 K/UL — LOW (ref 130–400)
PLATELET # BLD AUTO: 108 K/UL — LOW (ref 130–400)
PLATELET # BLD AUTO: 109 K/UL — LOW (ref 130–400)
PLATELET # BLD AUTO: 110 K/UL — LOW (ref 130–400)
PLATELET # BLD AUTO: 119 K/UL — LOW (ref 130–400)
PLATELET # BLD AUTO: 119 K/UL — LOW (ref 130–400)
PLATELET # BLD AUTO: 124 K/UL — LOW (ref 130–400)
PLATELET # BLD AUTO: 125 K/UL — LOW (ref 130–400)
PLATELET # BLD AUTO: 126 K/UL — LOW (ref 130–400)
PLATELET # BLD AUTO: 127 K/UL — LOW (ref 130–400)
PLATELET # BLD AUTO: 130 K/UL — SIGNIFICANT CHANGE UP (ref 130–400)
PLATELET # BLD AUTO: 133 K/UL — SIGNIFICANT CHANGE UP (ref 130–400)
PLATELET # BLD AUTO: 135 K/UL — SIGNIFICANT CHANGE UP (ref 130–400)
PLATELET # BLD AUTO: 136 K/UL — SIGNIFICANT CHANGE UP (ref 130–400)
PLATELET # BLD AUTO: 140 K/UL — SIGNIFICANT CHANGE UP (ref 130–400)
PLATELET # BLD AUTO: 141 K/UL — SIGNIFICANT CHANGE UP (ref 130–400)
PLATELET # BLD AUTO: 143 K/UL — SIGNIFICANT CHANGE UP (ref 130–400)
PLATELET # BLD AUTO: 143 K/UL — SIGNIFICANT CHANGE UP (ref 130–400)
PLATELET # BLD AUTO: 152 K/UL — SIGNIFICANT CHANGE UP (ref 130–400)
PLATELET # BLD AUTO: 183 K/UL — SIGNIFICANT CHANGE UP (ref 130–400)
PLATELET # BLD AUTO: 204 K/UL — SIGNIFICANT CHANGE UP (ref 130–400)
PLATELET # BLD AUTO: 253 K/UL — SIGNIFICANT CHANGE UP (ref 130–400)
PLATELET # BLD AUTO: 264 K/UL — SIGNIFICANT CHANGE UP (ref 130–400)
PLATELET # BLD AUTO: 278 K/UL — SIGNIFICANT CHANGE UP (ref 130–400)
PLATELET # BLD AUTO: 279 K/UL — SIGNIFICANT CHANGE UP (ref 130–400)
PLATELET # BLD AUTO: 291 K/UL — SIGNIFICANT CHANGE UP (ref 130–400)
PLATELET # BLD AUTO: 299 K/UL — SIGNIFICANT CHANGE UP (ref 130–400)
PLATELET # BLD AUTO: 307 K/UL — SIGNIFICANT CHANGE UP (ref 130–400)
PLATELET # BLD AUTO: 337 K/UL — SIGNIFICANT CHANGE UP (ref 130–400)
PLATELET # BLD AUTO: 347 K/UL — SIGNIFICANT CHANGE UP (ref 130–400)
PLATELET # BLD AUTO: 44 K/UL — LOW (ref 130–400)
PLATELET # BLD AUTO: 44 K/UL — LOW (ref 130–400)
PLATELET # BLD AUTO: 47 K/UL — LOW (ref 130–400)
PLATELET # BLD AUTO: 48 K/UL — LOW (ref 130–400)
PLATELET # BLD AUTO: 48 K/UL — LOW (ref 130–400)
PLATELET # BLD AUTO: 50 K/UL — LOW (ref 130–400)
PLATELET # BLD AUTO: 51 K/UL — LOW (ref 130–400)
PLATELET # BLD AUTO: 53 K/UL — LOW (ref 130–400)
PLATELET # BLD AUTO: 54 K/UL — LOW (ref 130–400)
PLATELET # BLD AUTO: 55 K/UL — LOW (ref 130–400)
PLATELET # BLD AUTO: 57 K/UL — LOW (ref 130–400)
PLATELET # BLD AUTO: 58 K/UL — LOW (ref 130–400)
PLATELET # BLD AUTO: 59 K/UL — LOW (ref 130–400)
PLATELET # BLD AUTO: 59 K/UL — LOW (ref 130–400)
PLATELET # BLD AUTO: 63 K/UL — LOW (ref 130–400)
PLATELET # BLD AUTO: 64 K/UL — LOW (ref 130–400)
PLATELET # BLD AUTO: 64 K/UL — LOW (ref 130–400)
PLATELET # BLD AUTO: 65 K/UL — LOW (ref 130–400)
PLATELET # BLD AUTO: 68 K/UL — LOW (ref 130–400)
PLATELET # BLD AUTO: 68 K/UL — LOW (ref 130–400)
PLATELET # BLD AUTO: 69 K/UL — LOW (ref 130–400)
PLATELET # BLD AUTO: 69 K/UL — LOW (ref 130–400)
PLATELET # BLD AUTO: 70 K/UL — LOW (ref 130–400)
PLATELET # BLD AUTO: 72 K/UL — LOW (ref 130–400)
PLATELET # BLD AUTO: 75 K/UL — LOW (ref 130–400)
PLATELET # BLD AUTO: 82 K/UL — LOW (ref 130–400)
PLATELET # BLD AUTO: 83 K/UL — LOW (ref 130–400)
PLATELET # BLD AUTO: 84 K/UL — LOW (ref 130–400)
PLATELET # BLD AUTO: 88 K/UL — LOW (ref 130–400)
PLATELET # BLD AUTO: 91 K/UL — LOW (ref 130–400)
PLATELET # BLD AUTO: 96 K/UL — LOW (ref 130–400)
PLATELET # BLD AUTO: 97 K/UL — LOW (ref 130–400)
PLATELET # BLD AUTO: 98 K/UL — LOW (ref 130–400)
PO2 BLDA: 106 MMHG — HIGH (ref 78–95)
PO2 BLDA: 109 MMHG — HIGH (ref 78–95)
PO2 BLDA: 111 MMHG — HIGH (ref 78–95)
PO2 BLDA: 122 MMHG — HIGH (ref 78–95)
PO2 BLDA: 126 MMHG — HIGH (ref 78–95)
PO2 BLDA: 132 MMHG — HIGH (ref 78–95)
PO2 BLDA: 134 MMHG — HIGH (ref 78–95)
PO2 BLDA: 145 MMHG — HIGH (ref 78–95)
PO2 BLDA: 168 MMHG — HIGH (ref 78–95)
PO2 BLDA: 370 MMHG — HIGH (ref 78–95)
PO2 BLDA: 44 MMHG — LOW (ref 78–95)
PO2 BLDA: 54 MMHG — LOW (ref 78–95)
PO2 BLDA: 56 MMHG — LOW (ref 78–95)
PO2 BLDA: 57 MMHG — LOW (ref 78–95)
PO2 BLDA: 60 MMHG — LOW (ref 78–95)
PO2 BLDA: 61 MMHG — LOW (ref 78–95)
PO2 BLDA: 62 MMHG — LOW (ref 78–95)
PO2 BLDA: 63 MMHG — LOW (ref 78–95)
PO2 BLDA: 63 MMHG — LOW (ref 78–95)
PO2 BLDA: 64 MMHG — LOW (ref 78–95)
PO2 BLDA: 66 MMHG — LOW (ref 78–95)
PO2 BLDA: 67 MMHG — LOW (ref 78–95)
PO2 BLDA: 71 MMHG — LOW (ref 78–95)
PO2 BLDA: 73 MMHG — LOW (ref 78–95)
PO2 BLDA: 76 MMHG — LOW (ref 78–95)
PO2 BLDA: 79 MMHG — SIGNIFICANT CHANGE UP (ref 78–95)
PO2 BLDA: 81 MMHG — SIGNIFICANT CHANGE UP (ref 78–95)
PO2 BLDA: 82 MMHG — SIGNIFICANT CHANGE UP (ref 78–95)
PO2 BLDA: 83 MMHG — SIGNIFICANT CHANGE UP (ref 78–95)
PO2 BLDA: 90 MMHG — SIGNIFICANT CHANGE UP (ref 78–95)
PO2 BLDA: 93 MMHG — SIGNIFICANT CHANGE UP (ref 78–95)
PO2 BLDA: 96 MMHG — HIGH (ref 78–95)
PO2 BLDV: 26 MMHG — SIGNIFICANT CHANGE UP (ref 20–40)
POCT INR: 1.5 RATIO — HIGH (ref 0.9–1.2)
POCT PT: 17.9 SEC — HIGH (ref 10–13.4)
POCT-PROTHROMBIN TIME: 39.5 SECS
POIKILOCYTOSIS BLD QL AUTO: SIGNIFICANT CHANGE UP
POIKILOCYTOSIS BLD QL AUTO: SLIGHT — SIGNIFICANT CHANGE UP
POIKILOCYTOSIS BLD QL AUTO: SLIGHT — SIGNIFICANT CHANGE UP
POLYCHROMASIA BLD QL SMEAR: SIGNIFICANT CHANGE UP
POLYCHROMASIA BLD QL SMEAR: SLIGHT — SIGNIFICANT CHANGE UP
POTASSIUM BLDV-SCNC: 2.9 MMOL/L — LOW (ref 3.3–5.6)
POTASSIUM SERPL-MCNC: 2.6 MMOL/L — CRITICAL LOW (ref 3.5–5)
POTASSIUM SERPL-MCNC: 2.7 MMOL/L — CRITICAL LOW (ref 3.5–5)
POTASSIUM SERPL-MCNC: 3.1 MMOL/L — LOW (ref 3.5–5)
POTASSIUM SERPL-MCNC: 3.1 MMOL/L — LOW (ref 3.5–5)
POTASSIUM SERPL-MCNC: 3.3 MMOL/L — LOW (ref 3.5–5)
POTASSIUM SERPL-MCNC: 3.4 MMOL/L — LOW (ref 3.5–5)
POTASSIUM SERPL-MCNC: 3.5 MMOL/L — SIGNIFICANT CHANGE UP (ref 3.5–5)
POTASSIUM SERPL-MCNC: 3.5 MMOL/L — SIGNIFICANT CHANGE UP (ref 3.5–5)
POTASSIUM SERPL-MCNC: 3.6 MMOL/L — SIGNIFICANT CHANGE UP (ref 3.5–5)
POTASSIUM SERPL-MCNC: 3.7 MMOL/L — SIGNIFICANT CHANGE UP (ref 3.5–5)
POTASSIUM SERPL-MCNC: 3.8 MMOL/L — SIGNIFICANT CHANGE UP (ref 3.5–5)
POTASSIUM SERPL-MCNC: 3.9 MMOL/L — SIGNIFICANT CHANGE UP (ref 3.5–5)
POTASSIUM SERPL-MCNC: 4 MMOL/L — SIGNIFICANT CHANGE UP (ref 3.5–5)
POTASSIUM SERPL-MCNC: 4.1 MMOL/L — SIGNIFICANT CHANGE UP (ref 3.5–5)
POTASSIUM SERPL-MCNC: 4.2 MMOL/L — SIGNIFICANT CHANGE UP (ref 3.5–5)
POTASSIUM SERPL-MCNC: 4.3 MMOL/L — SIGNIFICANT CHANGE UP (ref 3.5–5)
POTASSIUM SERPL-MCNC: 4.4 MMOL/L — SIGNIFICANT CHANGE UP (ref 3.5–5)
POTASSIUM SERPL-MCNC: 4.5 MMOL/L — SIGNIFICANT CHANGE UP (ref 3.5–5)
POTASSIUM SERPL-MCNC: 4.5 MMOL/L — SIGNIFICANT CHANGE UP (ref 3.5–5)
POTASSIUM SERPL-MCNC: 4.6 MMOL/L — SIGNIFICANT CHANGE UP (ref 3.5–5)
POTASSIUM SERPL-MCNC: 4.7 MMOL/L — SIGNIFICANT CHANGE UP (ref 3.5–5)
POTASSIUM SERPL-MCNC: 4.8 MMOL/L — SIGNIFICANT CHANGE UP (ref 3.5–5)
POTASSIUM SERPL-MCNC: 4.9 MMOL/L — SIGNIFICANT CHANGE UP (ref 3.5–5)
POTASSIUM SERPL-MCNC: 4.9 MMOL/L — SIGNIFICANT CHANGE UP (ref 3.5–5)
POTASSIUM SERPL-MCNC: 5 MMOL/L — SIGNIFICANT CHANGE UP (ref 3.5–5)
POTASSIUM SERPL-MCNC: 5 MMOL/L — SIGNIFICANT CHANGE UP (ref 3.5–5)
POTASSIUM SERPL-MCNC: 5.2 MMOL/L — HIGH (ref 3.5–5)
POTASSIUM SERPL-MCNC: 5.2 MMOL/L — HIGH (ref 3.5–5)
POTASSIUM SERPL-SCNC: 2.6 MMOL/L — CRITICAL LOW (ref 3.5–5)
POTASSIUM SERPL-SCNC: 2.7 MMOL/L — CRITICAL LOW (ref 3.5–5)
POTASSIUM SERPL-SCNC: 3.1 MMOL/L — LOW (ref 3.5–5)
POTASSIUM SERPL-SCNC: 3.1 MMOL/L — LOW (ref 3.5–5)
POTASSIUM SERPL-SCNC: 3.3 MMOL/L — LOW (ref 3.5–5)
POTASSIUM SERPL-SCNC: 3.4 MMOL/L — LOW (ref 3.5–5)
POTASSIUM SERPL-SCNC: 3.5 MMOL/L — SIGNIFICANT CHANGE UP (ref 3.5–5)
POTASSIUM SERPL-SCNC: 3.5 MMOL/L — SIGNIFICANT CHANGE UP (ref 3.5–5)
POTASSIUM SERPL-SCNC: 3.6 MMOL/L — SIGNIFICANT CHANGE UP (ref 3.5–5)
POTASSIUM SERPL-SCNC: 3.7 MMOL/L — SIGNIFICANT CHANGE UP (ref 3.5–5)
POTASSIUM SERPL-SCNC: 3.8 MMOL/L — SIGNIFICANT CHANGE UP (ref 3.5–5)
POTASSIUM SERPL-SCNC: 3.9 MMOL/L — SIGNIFICANT CHANGE UP (ref 3.5–5)
POTASSIUM SERPL-SCNC: 4 MMOL/L — SIGNIFICANT CHANGE UP (ref 3.5–5)
POTASSIUM SERPL-SCNC: 4.1 MMOL/L — SIGNIFICANT CHANGE UP (ref 3.5–5)
POTASSIUM SERPL-SCNC: 4.2 MMOL/L — SIGNIFICANT CHANGE UP (ref 3.5–5)
POTASSIUM SERPL-SCNC: 4.3 MMOL/L — SIGNIFICANT CHANGE UP (ref 3.5–5)
POTASSIUM SERPL-SCNC: 4.4 MMOL/L — SIGNIFICANT CHANGE UP (ref 3.5–5)
POTASSIUM SERPL-SCNC: 4.5 MMOL/L — SIGNIFICANT CHANGE UP (ref 3.5–5)
POTASSIUM SERPL-SCNC: 4.5 MMOL/L — SIGNIFICANT CHANGE UP (ref 3.5–5)
POTASSIUM SERPL-SCNC: 4.6 MMOL/L — SIGNIFICANT CHANGE UP (ref 3.5–5)
POTASSIUM SERPL-SCNC: 4.7 MMOL/L — SIGNIFICANT CHANGE UP (ref 3.5–5)
POTASSIUM SERPL-SCNC: 4.8 MMOL/L — SIGNIFICANT CHANGE UP (ref 3.5–5)
POTASSIUM SERPL-SCNC: 4.9 MMOL/L — SIGNIFICANT CHANGE UP (ref 3.5–5)
POTASSIUM SERPL-SCNC: 4.9 MMOL/L — SIGNIFICANT CHANGE UP (ref 3.5–5)
POTASSIUM SERPL-SCNC: 5 MMOL/L — SIGNIFICANT CHANGE UP (ref 3.5–5)
POTASSIUM SERPL-SCNC: 5 MMOL/L — SIGNIFICANT CHANGE UP (ref 3.5–5)
POTASSIUM SERPL-SCNC: 5.2 MMOL/L — HIGH (ref 3.5–5)
POTASSIUM SERPL-SCNC: 5.2 MMOL/L — HIGH (ref 3.5–5)
PROCALCITONIN SERPL-MCNC: 0.05 NG/ML — SIGNIFICANT CHANGE UP (ref 0.02–0.1)
PROCALCITONIN SERPL-MCNC: 0.77 NG/ML — HIGH (ref 0.02–0.1)
PROCALCITONIN SERPL-MCNC: 1.35 NG/ML — HIGH (ref 0.02–0.1)
PROCALCITONIN SERPL-MCNC: 1.86 NG/ML — HIGH (ref 0.02–0.1)
PROT PATTERN SERPL ELPH-IMP: SIGNIFICANT CHANGE UP
PROT SERPL-MCNC: 3.6 G/DL — LOW (ref 6–8)
PROT SERPL-MCNC: 4 G/DL — LOW (ref 6–8)
PROT SERPL-MCNC: 4 G/DL — LOW (ref 6–8)
PROT SERPL-MCNC: 4.1 G/DL — LOW (ref 6–8)
PROT SERPL-MCNC: 4.1 G/DL — LOW (ref 6–8)
PROT SERPL-MCNC: 4.2 G/DL — LOW (ref 6–8)
PROT SERPL-MCNC: 4.3 G/DL — LOW (ref 6–8)
PROT SERPL-MCNC: 4.4 G/DL — LOW (ref 6–8)
PROT SERPL-MCNC: 4.5 G/DL — LOW (ref 6–8)
PROT SERPL-MCNC: 4.6 G/DL — LOW (ref 6–8)
PROT SERPL-MCNC: 4.8 G/DL — LOW (ref 6–8)
PROT SERPL-MCNC: 4.9 G/DL — LOW (ref 6–8)
PROT SERPL-MCNC: 5.1 G/DL — LOW (ref 6–8)
PROT SERPL-MCNC: 5.3 G/DL — LOW (ref 6–8)
PROT SERPL-MCNC: 5.4 G/DL — LOW (ref 6–8)
PROT SERPL-MCNC: 5.4 G/DL — LOW (ref 6–8)
PROT SERPL-MCNC: 5.5 G/DL — LOW (ref 6–8)
PROT SERPL-MCNC: 5.6 G/DL — LOW (ref 6–8)
PROT SERPL-MCNC: 5.6 G/DL — LOW (ref 6–8)
PROT SERPL-MCNC: 5.8 G/DL — LOW (ref 6–8)
PROT SERPL-MCNC: 6 G/DL — SIGNIFICANT CHANGE UP (ref 6–8)
PROT SERPL-MCNC: 6 G/DL — SIGNIFICANT CHANGE UP (ref 6–8)
PROT SERPL-MCNC: 6.3 G/DL — SIGNIFICANT CHANGE UP (ref 6–8)
PROT SERPL-MCNC: 6.6 G/DL — SIGNIFICANT CHANGE UP (ref 6–8)
PROT SERPL-MCNC: 7 G/DL — SIGNIFICANT CHANGE UP (ref 6–8.3)
PROT SERPL-MCNC: 7 G/DL — SIGNIFICANT CHANGE UP (ref 6–8.3)
PROT UR-MCNC: ABNORMAL
PROT UR-MCNC: ABNORMAL
PROTHROM AB SERPL-ACNC: 11.5 SEC — SIGNIFICANT CHANGE UP (ref 9.95–12.87)
PROTHROM AB SERPL-ACNC: 12.1 SEC — SIGNIFICANT CHANGE UP (ref 9.95–12.87)
PROTHROM AB SERPL-ACNC: 13.3 SEC — HIGH (ref 9.95–12.87)
PROTHROM AB SERPL-ACNC: 13.4 SEC — HIGH (ref 9.95–12.87)
PROTHROM AB SERPL-ACNC: 13.5 SEC — HIGH (ref 9.95–12.87)
PROTHROM AB SERPL-ACNC: 13.6 SEC — HIGH (ref 9.95–12.87)
PROTHROM AB SERPL-ACNC: 14 SEC — HIGH (ref 9.95–12.87)
PROTHROM AB SERPL-ACNC: 14.6 SEC — HIGH (ref 9.95–12.87)
PROTHROM AB SERPL-ACNC: 14.6 SEC — HIGH (ref 9.95–12.87)
PROTHROM AB SERPL-ACNC: 16.9 SEC — HIGH (ref 9.95–12.87)
PROTHROM AB SERPL-ACNC: 17.4 SEC — HIGH (ref 9.95–12.87)
PROTHROM AB SERPL-ACNC: 18.7 SEC — HIGH (ref 9.95–12.87)
PROTHROM AB SERPL-ACNC: 18.9 SEC — HIGH (ref 9.95–12.87)
PROTHROM AB SERPL-ACNC: 18.9 SEC — HIGH (ref 9.95–12.87)
PROTHROM AB SERPL-ACNC: 19.2 SEC — HIGH (ref 9.95–12.87)
PROTHROM AB SERPL-ACNC: 20.8 SEC — HIGH (ref 9.95–12.87)
PROTHROM AB SERPL-ACNC: 27.9 SEC — HIGH (ref 9.95–12.87)
PROTHROM AB SERPL-ACNC: 30.7 SEC — HIGH (ref 9.95–12.87)
PROTHROM AB SERPL-ACNC: 33.8 SEC — HIGH (ref 9.95–12.87)
PROTHROM AB SERPL-ACNC: >40 SEC — HIGH (ref 9.95–12.87)
PROTHROM AB SERPL-ACNC: >40 SEC — HIGH (ref 9.95–12.87)
PTH-INTACT FLD-MCNC: 398 PG/ML — HIGH (ref 15–65)
QUANT TB PLUS MITOGEN MINUS NIL: 0.17 IU/ML — SIGNIFICANT CHANGE UP
RAPID RVP RESULT: DETECTED
RBC # BLD: 2.04 M/UL — LOW (ref 4.2–5.4)
RBC # BLD: 2.19 M/UL — LOW (ref 4.2–5.4)
RBC # BLD: 2.2 M/UL — LOW (ref 4.2–5.4)
RBC # BLD: 2.39 M/UL — LOW (ref 4.2–5.4)
RBC # BLD: 2.39 M/UL — LOW (ref 4.2–5.4)
RBC # BLD: 2.41 M/UL — LOW (ref 4.2–5.4)
RBC # BLD: 2.45 M/UL — LOW (ref 4.2–5.4)
RBC # BLD: 2.49 M/UL — LOW (ref 4.2–5.4)
RBC # BLD: 2.5 M/UL — LOW (ref 4.2–5.4)
RBC # BLD: 2.58 M/UL — LOW (ref 4.2–5.4)
RBC # BLD: 2.59 M/UL — LOW (ref 4.2–5.4)
RBC # BLD: 2.6 M/UL — LOW (ref 4.2–5.4)
RBC # BLD: 2.64 M/UL — LOW (ref 4.2–5.4)
RBC # BLD: 2.64 M/UL — LOW (ref 4.2–5.4)
RBC # BLD: 2.66 M/UL — LOW (ref 4.2–5.4)
RBC # BLD: 2.67 M/UL — LOW (ref 4.2–5.4)
RBC # BLD: 2.69 M/UL — LOW (ref 4.2–5.4)
RBC # BLD: 2.7 M/UL — LOW (ref 4.2–5.4)
RBC # BLD: 2.73 M/UL — LOW (ref 4.2–5.4)
RBC # BLD: 2.75 M/UL — LOW (ref 4.2–5.4)
RBC # BLD: 2.77 M/UL — LOW (ref 4.2–5.4)
RBC # BLD: 2.79 M/UL — LOW (ref 4.2–5.4)
RBC # BLD: 2.79 M/UL — LOW (ref 4.2–5.4)
RBC # BLD: 2.8 M/UL — LOW (ref 4.2–5.4)
RBC # BLD: 2.86 M/UL — LOW (ref 4.2–5.4)
RBC # BLD: 2.87 M/UL — LOW (ref 4.2–5.4)
RBC # BLD: 2.88 M/UL — LOW (ref 4.2–5.4)
RBC # BLD: 2.9 M/UL — LOW (ref 4.2–5.4)
RBC # BLD: 2.92 M/UL — LOW (ref 4.2–5.4)
RBC # BLD: 2.92 M/UL — LOW (ref 4.2–5.4)
RBC # BLD: 2.95 M/UL — LOW (ref 4.2–5.4)
RBC # BLD: 2.98 M/UL — LOW (ref 4.2–5.4)
RBC # BLD: 2.99 M/UL — LOW (ref 4.2–5.4)
RBC # BLD: 3 M/UL — LOW (ref 4.2–5.4)
RBC # BLD: 3.01 M/UL — LOW (ref 4.2–5.4)
RBC # BLD: 3.03 M/UL — LOW (ref 4.2–5.4)
RBC # BLD: 3.05 M/UL — LOW (ref 4.2–5.4)
RBC # BLD: 3.06 M/UL — LOW (ref 4.2–5.4)
RBC # BLD: 3.07 M/UL — LOW (ref 4.2–5.4)
RBC # BLD: 3.09 M/UL — LOW (ref 4.2–5.4)
RBC # BLD: 3.1 M/UL — LOW (ref 4.2–5.4)
RBC # BLD: 3.1 M/UL — LOW (ref 4.2–5.4)
RBC # BLD: 3.12 M/UL — LOW (ref 4.2–5.4)
RBC # BLD: 3.14 M/UL — LOW (ref 4.2–5.4)
RBC # BLD: 3.16 M/UL — LOW (ref 4.2–5.4)
RBC # BLD: 3.19 M/UL — LOW (ref 4.2–5.4)
RBC # BLD: 3.2 M/UL — LOW (ref 4.2–5.4)
RBC # BLD: 3.21 M/UL — LOW (ref 4.2–5.4)
RBC # BLD: 3.21 M/UL — LOW (ref 4.2–5.4)
RBC # BLD: 3.31 M/UL — LOW (ref 4.2–5.4)
RBC # BLD: 3.36 M/UL — LOW (ref 4.2–5.4)
RBC # BLD: 3.38 M/UL — LOW (ref 4.2–5.4)
RBC # BLD: 3.44 M/UL — LOW (ref 4.2–5.4)
RBC # BLD: 3.59 M/UL — LOW (ref 4.2–5.4)
RBC # BLD: 3.79 M/UL — LOW (ref 4.2–5.4)
RBC # BLD: 4.31 M/UL — SIGNIFICANT CHANGE UP (ref 4.2–5.4)
RBC # BLD: 4.43 M/UL — SIGNIFICANT CHANGE UP (ref 4.2–5.4)
RBC # BLD: 4.53 M/UL — SIGNIFICANT CHANGE UP (ref 4.2–5.4)
RBC # BLD: 4.55 M/UL — SIGNIFICANT CHANGE UP (ref 4.2–5.4)
RBC # BLD: 4.6 M/UL — SIGNIFICANT CHANGE UP (ref 4.2–5.4)
RBC # BLD: 4.71 M/UL — SIGNIFICANT CHANGE UP (ref 4.2–5.4)
RBC # BLD: 4.72 M/UL — SIGNIFICANT CHANGE UP (ref 4.2–5.4)
RBC # BLD: 4.86 M/UL — SIGNIFICANT CHANGE UP (ref 4.2–5.4)
RBC # BLD: 4.89 M/UL — SIGNIFICANT CHANGE UP (ref 4.2–5.4)
RBC # BLD: 4.92 M/UL — SIGNIFICANT CHANGE UP (ref 4.2–5.4)
RBC # BLD: 5.33 M/UL — SIGNIFICANT CHANGE UP (ref 4.2–5.4)
RBC # BLD: 5.4 M/UL — SIGNIFICANT CHANGE UP (ref 4.2–5.4)
RBC # FLD: 15.3 % — HIGH (ref 11.5–14.5)
RBC # FLD: 15.6 % — HIGH (ref 11.5–14.5)
RBC # FLD: 15.6 % — HIGH (ref 11.5–14.5)
RBC # FLD: 15.7 % — HIGH (ref 11.5–14.5)
RBC # FLD: 15.8 % — HIGH (ref 11.5–14.5)
RBC # FLD: 15.9 % — HIGH (ref 11.5–14.5)
RBC # FLD: 16 % — HIGH (ref 11.5–14.5)
RBC # FLD: 16.1 % — HIGH (ref 11.5–14.5)
RBC # FLD: 16.1 % — HIGH (ref 11.5–14.5)
RBC # FLD: 16.2 % — HIGH (ref 11.5–14.5)
RBC # FLD: 16.2 % — HIGH (ref 11.5–14.5)
RBC # FLD: 16.3 % — HIGH (ref 11.5–14.5)
RBC # FLD: 16.3 % — HIGH (ref 11.5–14.5)
RBC # FLD: 16.4 % — HIGH (ref 11.5–14.5)
RBC # FLD: 16.5 % — HIGH (ref 11.5–14.5)
RBC # FLD: 16.6 % — HIGH (ref 11.5–14.5)
RBC # FLD: 16.6 % — HIGH (ref 11.5–14.5)
RBC # FLD: 16.7 % — HIGH (ref 11.5–14.5)
RBC # FLD: 16.8 % — HIGH (ref 11.5–14.5)
RBC # FLD: 16.9 % — HIGH (ref 11.5–14.5)
RBC # FLD: 16.9 % — HIGH (ref 11.5–14.5)
RBC # FLD: 17.1 % — HIGH (ref 11.5–14.5)
RBC # FLD: 17.1 % — HIGH (ref 11.5–14.5)
RBC # FLD: 17.2 % — HIGH (ref 11.5–14.5)
RBC # FLD: 17.3 % — HIGH (ref 11.5–14.5)
RBC # FLD: 17.4 % — HIGH (ref 11.5–14.5)
RBC # FLD: 17.4 % — HIGH (ref 11.5–14.5)
RBC # FLD: 17.5 % — HIGH (ref 11.5–14.5)
RBC # FLD: 17.7 % — HIGH (ref 11.5–14.5)
RBC # FLD: 17.8 % — HIGH (ref 11.5–14.5)
RBC # FLD: 17.9 % — HIGH (ref 11.5–14.5)
RBC # FLD: 18 % — HIGH (ref 11.5–14.5)
RBC # FLD: 18.1 % — HIGH (ref 11.5–14.5)
RBC BLD AUTO: ABNORMAL
RBC BLD AUTO: NORMAL — SIGNIFICANT CHANGE UP
RBC CASTS # UR COMP ASSIST: 1 /HPF — SIGNIFICANT CHANGE UP (ref 0–4)
RBC CASTS # UR COMP ASSIST: 23 /HPF — HIGH (ref 0–4)
RCV VOL RI: 1000 /UL — HIGH (ref 0–0)
RETICS #: 33.5 K/UL — SIGNIFICANT CHANGE UP (ref 25–125)
RETICS/RBC NFR: 1.4 % — SIGNIFICANT CHANGE UP (ref 0.5–1.5)
RHEUMATOID FACT SERPL-ACNC: 59 IU/ML — HIGH (ref 0–13)
RSV RESULT: NEGATIVE — SIGNIFICANT CHANGE UP
RSV RNA RESP QL NAA+PROBE: NEGATIVE — SIGNIFICANT CHANGE UP
S PNEUM AG UR QL: NEGATIVE — SIGNIFICANT CHANGE UP
S PNEUM AG UR QL: NEGATIVE — SIGNIFICANT CHANGE UP
SAO2 % BLDA: 100 % — HIGH (ref 92–96)
SAO2 % BLDA: 75 % — LOW (ref 92–96)
SAO2 % BLDA: 88 % — LOW (ref 92–96)
SAO2 % BLDA: 88 % — LOW (ref 94–98)
SAO2 % BLDA: 89 % — LOW (ref 92–96)
SAO2 % BLDA: 90 % — LOW (ref 94–98)
SAO2 % BLDA: 91 % — LOW (ref 92–96)
SAO2 % BLDA: 92 % — SIGNIFICANT CHANGE UP (ref 92–96)
SAO2 % BLDA: 93 % — SIGNIFICANT CHANGE UP (ref 92–96)
SAO2 % BLDA: 94 % — SIGNIFICANT CHANGE UP (ref 92–96)
SAO2 % BLDA: 94 % — SIGNIFICANT CHANGE UP (ref 94–98)
SAO2 % BLDA: 95 % — SIGNIFICANT CHANGE UP (ref 92–96)
SAO2 % BLDA: 95 % — SIGNIFICANT CHANGE UP (ref 92–96)
SAO2 % BLDA: 96 % — SIGNIFICANT CHANGE UP (ref 92–96)
SAO2 % BLDA: 96 % — SIGNIFICANT CHANGE UP (ref 94–98)
SAO2 % BLDA: 97 % — HIGH (ref 92–96)
SAO2 % BLDA: 98 % — HIGH (ref 92–96)
SAO2 % BLDA: 98 % — SIGNIFICANT CHANGE UP (ref 94–98)
SAO2 % BLDA: 99 % — HIGH (ref 92–96)
SAO2 % BLDV: 38 % — SIGNIFICANT CHANGE UP
SMUDGE CELLS # BLD: PRESENT — SIGNIFICANT CHANGE UP
SODIUM SERPL-SCNC: 128 MMOL/L — LOW (ref 135–146)
SODIUM SERPL-SCNC: 129 MMOL/L — LOW (ref 135–146)
SODIUM SERPL-SCNC: 131 MMOL/L — LOW (ref 135–146)
SODIUM SERPL-SCNC: 132 MMOL/L — LOW (ref 135–146)
SODIUM SERPL-SCNC: 134 MMOL/L — LOW (ref 135–146)
SODIUM SERPL-SCNC: 136 MMOL/L — SIGNIFICANT CHANGE UP (ref 135–146)
SODIUM SERPL-SCNC: 137 MMOL/L — SIGNIFICANT CHANGE UP (ref 135–146)
SODIUM SERPL-SCNC: 138 MMOL/L — SIGNIFICANT CHANGE UP (ref 135–146)
SODIUM SERPL-SCNC: 139 MMOL/L — SIGNIFICANT CHANGE UP (ref 135–146)
SODIUM SERPL-SCNC: 140 MMOL/L — SIGNIFICANT CHANGE UP (ref 135–146)
SODIUM SERPL-SCNC: 141 MMOL/L — SIGNIFICANT CHANGE UP (ref 135–146)
SODIUM SERPL-SCNC: 142 MMOL/L — SIGNIFICANT CHANGE UP (ref 135–146)
SODIUM SERPL-SCNC: 143 MMOL/L — SIGNIFICANT CHANGE UP (ref 135–146)
SODIUM SERPL-SCNC: 144 MMOL/L — SIGNIFICANT CHANGE UP (ref 135–146)
SODIUM SERPL-SCNC: 145 MMOL/L — SIGNIFICANT CHANGE UP (ref 135–146)
SODIUM SERPL-SCNC: 146 MMOL/L — SIGNIFICANT CHANGE UP (ref 135–146)
SODIUM SERPL-SCNC: 147 MMOL/L — HIGH (ref 135–146)
SODIUM SERPL-SCNC: 148 MMOL/L — HIGH (ref 135–146)
SODIUM SERPL-SCNC: 148 MMOL/L — HIGH (ref 135–146)
SODIUM SERPL-SCNC: 149 MMOL/L — HIGH (ref 135–146)
SODIUM SERPL-SCNC: 151 MMOL/L — HIGH (ref 135–146)
SODIUM SERPL-SCNC: 152 MMOL/L — HIGH (ref 135–146)
SODIUM SERPL-SCNC: 155 MMOL/L — HIGH (ref 135–146)
SP GR SPEC: 1.01 — SIGNIFICANT CHANGE UP (ref 1.01–1.02)
SP GR SPEC: 1.02 — SIGNIFICANT CHANGE UP (ref 1.01–1.02)
SPECIMEN SOURCE FLD: SIGNIFICANT CHANGE UP
SPECIMEN SOURCE: SIGNIFICANT CHANGE UP
TACROLIMUS SERPL-MCNC: 10.6 NG/ML — SIGNIFICANT CHANGE UP
TACROLIMUS SERPL-MCNC: 11.3 NG/ML — SIGNIFICANT CHANGE UP
TACROLIMUS SERPL-MCNC: 11.7 NG/ML — SIGNIFICANT CHANGE UP
TACROLIMUS SERPL-MCNC: 2.7 NG/ML — SIGNIFICANT CHANGE UP
TACROLIMUS SERPL-MCNC: 3.5 NG/ML — SIGNIFICANT CHANGE UP
TACROLIMUS SERPL-MCNC: <2 NG/ML — SIGNIFICANT CHANGE UP
TOTAL CHOLESTEROL/HDL RATIO MEASUREMENT: 2.7 RATIO — LOW (ref 4–5.5)
TOTAL NUCLEATED CELL COUNT, BODY FLUID: 109 /UL — SIGNIFICANT CHANGE UP
TRIGL SERPL-MCNC: 128 MG/DL — SIGNIFICANT CHANGE UP (ref 10–149)
TROPONIN T SERPL-MCNC: 0.11 NG/ML — CRITICAL HIGH
TROPONIN T SERPL-MCNC: 0.12 NG/ML — CRITICAL HIGH
TROPONIN T SERPL-MCNC: 0.13 NG/ML — CRITICAL HIGH
TROPONIN T SERPL-MCNC: <0.01 NG/ML — SIGNIFICANT CHANGE UP
TROPONIN T SERPL-MCNC: <0.01 NG/ML — SIGNIFICANT CHANGE UP
TSH SERPL-MCNC: 0.57 UIU/ML — SIGNIFICANT CHANGE UP (ref 0.27–4.2)
TUBE TYPE: SIGNIFICANT CHANGE UP
UROBILINOGEN FLD QL: SIGNIFICANT CHANGE UP
UROBILINOGEN FLD QL: SIGNIFICANT CHANGE UP
VANCOMYCIN FLD-MCNC: 23.2 UG/ML — HIGH (ref 5–10)
VANCOMYCIN TROUGH SERPL-MCNC: 22.2 UG/ML — HIGH (ref 5–10)
VANCOMYCIN TROUGH SERPL-MCNC: 25.2 UG/ML — HIGH (ref 5–10)
VANCOMYCIN TROUGH SERPL-MCNC: 25.2 UG/ML — HIGH (ref 5–10)
VANCOMYCIN TROUGH SERPL-MCNC: 32.4 UG/ML — HIGH (ref 5–10)
VANCOMYCIN TROUGH SERPL-MCNC: 32.7 UG/ML — HIGH (ref 5–10)
VANCOMYCIN TROUGH SERPL-MCNC: 34.9 UG/ML — HIGH (ref 5–10)
VARIANT LYMPHS # BLD: 0.9 % — SIGNIFICANT CHANGE UP (ref 0–5)
WBC # BLD: 10.03 K/UL — SIGNIFICANT CHANGE UP (ref 4.8–10.8)
WBC # BLD: 10.13 K/UL — SIGNIFICANT CHANGE UP (ref 4.8–10.8)
WBC # BLD: 10.92 K/UL — HIGH (ref 4.8–10.8)
WBC # BLD: 12.92 K/UL — HIGH (ref 4.8–10.8)
WBC # BLD: 13.97 K/UL — HIGH (ref 4.8–10.8)
WBC # BLD: 13.98 K/UL — HIGH (ref 4.8–10.8)
WBC # BLD: 14.75 K/UL — HIGH (ref 4.8–10.8)
WBC # BLD: 14.8 K/UL — HIGH (ref 4.8–10.8)
WBC # BLD: 14.97 K/UL — HIGH (ref 4.8–10.8)
WBC # BLD: 15.01 K/UL — HIGH (ref 4.8–10.8)
WBC # BLD: 15.21 K/UL — HIGH (ref 4.8–10.8)
WBC # BLD: 15.51 K/UL — HIGH (ref 4.8–10.8)
WBC # BLD: 15.59 K/UL — HIGH (ref 4.8–10.8)
WBC # BLD: 16.26 K/UL — HIGH (ref 4.8–10.8)
WBC # BLD: 16.3 K/UL — HIGH (ref 4.8–10.8)
WBC # BLD: 16.54 K/UL — HIGH (ref 4.8–10.8)
WBC # BLD: 16.55 K/UL — HIGH (ref 4.8–10.8)
WBC # BLD: 16.92 K/UL — HIGH (ref 4.8–10.8)
WBC # BLD: 17.13 K/UL — HIGH (ref 4.8–10.8)
WBC # BLD: 18.6 K/UL — HIGH (ref 4.8–10.8)
WBC # BLD: 18.87 K/UL — HIGH (ref 4.8–10.8)
WBC # BLD: 18.91 K/UL — HIGH (ref 4.8–10.8)
WBC # BLD: 18.98 K/UL — HIGH (ref 4.8–10.8)
WBC # BLD: 19.42 K/UL — HIGH (ref 4.8–10.8)
WBC # BLD: 19.54 K/UL — HIGH (ref 4.8–10.8)
WBC # BLD: 2.09 K/UL — LOW (ref 4.8–10.8)
WBC # BLD: 2.37 K/UL — LOW (ref 4.8–10.8)
WBC # BLD: 2.72 K/UL — LOW (ref 4.8–10.8)
WBC # BLD: 2.79 K/UL — LOW (ref 4.8–10.8)
WBC # BLD: 20.11 K/UL — HIGH (ref 4.8–10.8)
WBC # BLD: 20.16 K/UL — HIGH (ref 4.8–10.8)
WBC # BLD: 20.49 K/UL — HIGH (ref 4.8–10.8)
WBC # BLD: 20.53 K/UL — HIGH (ref 4.8–10.8)
WBC # BLD: 21.46 K/UL — HIGH (ref 4.8–10.8)
WBC # BLD: 21.76 K/UL — HIGH (ref 4.8–10.8)
WBC # BLD: 22.42 K/UL — HIGH (ref 4.8–10.8)
WBC # BLD: 22.44 K/UL — HIGH (ref 4.8–10.8)
WBC # BLD: 22.7 K/UL — HIGH (ref 4.8–10.8)
WBC # BLD: 24.12 K/UL — HIGH (ref 4.8–10.8)
WBC # BLD: 24.61 K/UL — HIGH (ref 4.8–10.8)
WBC # BLD: 24.87 K/UL — HIGH (ref 4.8–10.8)
WBC # BLD: 25.2 K/UL — HIGH (ref 4.8–10.8)
WBC # BLD: 25.26 K/UL — HIGH (ref 4.8–10.8)
WBC # BLD: 28.61 K/UL — HIGH (ref 4.8–10.8)
WBC # BLD: 3.2 K/UL — LOW (ref 4.8–10.8)
WBC # BLD: 3.53 K/UL — LOW (ref 4.8–10.8)
WBC # BLD: 3.64 K/UL — LOW (ref 4.8–10.8)
WBC # BLD: 3.7 K/UL — LOW (ref 4.8–10.8)
WBC # BLD: 4.01 K/UL — LOW (ref 4.8–10.8)
WBC # BLD: 4.03 K/UL — LOW (ref 4.8–10.8)
WBC # BLD: 4.35 K/UL — LOW (ref 4.8–10.8)
WBC # BLD: 4.4 K/UL — LOW (ref 4.8–10.8)
WBC # BLD: 4.57 K/UL — LOW (ref 4.8–10.8)
WBC # BLD: 4.87 K/UL — SIGNIFICANT CHANGE UP (ref 4.8–10.8)
WBC # BLD: 4.95 K/UL — SIGNIFICANT CHANGE UP (ref 4.8–10.8)
WBC # BLD: 5.18 K/UL — SIGNIFICANT CHANGE UP (ref 4.8–10.8)
WBC # BLD: 5.59 K/UL — SIGNIFICANT CHANGE UP (ref 4.8–10.8)
WBC # BLD: 5.61 K/UL — SIGNIFICANT CHANGE UP (ref 4.8–10.8)
WBC # BLD: 6.13 K/UL — SIGNIFICANT CHANGE UP (ref 4.8–10.8)
WBC # BLD: 6.5 K/UL — SIGNIFICANT CHANGE UP (ref 4.8–10.8)
WBC # BLD: 6.55 K/UL — SIGNIFICANT CHANGE UP (ref 4.8–10.8)
WBC # BLD: 6.56 K/UL — SIGNIFICANT CHANGE UP (ref 4.8–10.8)
WBC # BLD: 6.99 K/UL — SIGNIFICANT CHANGE UP (ref 4.8–10.8)
WBC # BLD: 8.6 K/UL — SIGNIFICANT CHANGE UP (ref 4.8–10.8)
WBC # BLD: 8.92 K/UL — SIGNIFICANT CHANGE UP (ref 4.8–10.8)
WBC # BLD: 9.02 K/UL — SIGNIFICANT CHANGE UP (ref 4.8–10.8)
WBC # BLD: 9.06 K/UL — SIGNIFICANT CHANGE UP (ref 4.8–10.8)
WBC # BLD: 9.81 K/UL — SIGNIFICANT CHANGE UP (ref 4.8–10.8)
WBC # FLD AUTO: 10.03 K/UL — SIGNIFICANT CHANGE UP (ref 4.8–10.8)
WBC # FLD AUTO: 10.13 K/UL — SIGNIFICANT CHANGE UP (ref 4.8–10.8)
WBC # FLD AUTO: 10.92 K/UL — HIGH (ref 4.8–10.8)
WBC # FLD AUTO: 12.92 K/UL — HIGH (ref 4.8–10.8)
WBC # FLD AUTO: 13.97 K/UL — HIGH (ref 4.8–10.8)
WBC # FLD AUTO: 13.98 K/UL — HIGH (ref 4.8–10.8)
WBC # FLD AUTO: 14.75 K/UL — HIGH (ref 4.8–10.8)
WBC # FLD AUTO: 14.8 K/UL — HIGH (ref 4.8–10.8)
WBC # FLD AUTO: 14.97 K/UL — HIGH (ref 4.8–10.8)
WBC # FLD AUTO: 15.01 K/UL — HIGH (ref 4.8–10.8)
WBC # FLD AUTO: 15.21 K/UL — HIGH (ref 4.8–10.8)
WBC # FLD AUTO: 15.51 K/UL — HIGH (ref 4.8–10.8)
WBC # FLD AUTO: 15.59 K/UL — HIGH (ref 4.8–10.8)
WBC # FLD AUTO: 16.26 K/UL — HIGH (ref 4.8–10.8)
WBC # FLD AUTO: 16.3 K/UL — HIGH (ref 4.8–10.8)
WBC # FLD AUTO: 16.54 K/UL — HIGH (ref 4.8–10.8)
WBC # FLD AUTO: 16.55 K/UL — HIGH (ref 4.8–10.8)
WBC # FLD AUTO: 16.92 K/UL — HIGH (ref 4.8–10.8)
WBC # FLD AUTO: 17.13 K/UL — HIGH (ref 4.8–10.8)
WBC # FLD AUTO: 18.6 K/UL — HIGH (ref 4.8–10.8)
WBC # FLD AUTO: 18.87 K/UL — HIGH (ref 4.8–10.8)
WBC # FLD AUTO: 18.91 K/UL — HIGH (ref 4.8–10.8)
WBC # FLD AUTO: 18.98 K/UL — HIGH (ref 4.8–10.8)
WBC # FLD AUTO: 19.42 K/UL — HIGH (ref 4.8–10.8)
WBC # FLD AUTO: 19.54 K/UL — HIGH (ref 4.8–10.8)
WBC # FLD AUTO: 2.09 K/UL — LOW (ref 4.8–10.8)
WBC # FLD AUTO: 2.37 K/UL — LOW (ref 4.8–10.8)
WBC # FLD AUTO: 2.72 K/UL — LOW (ref 4.8–10.8)
WBC # FLD AUTO: 2.79 K/UL — LOW (ref 4.8–10.8)
WBC # FLD AUTO: 20.11 K/UL — HIGH (ref 4.8–10.8)
WBC # FLD AUTO: 20.16 K/UL — HIGH (ref 4.8–10.8)
WBC # FLD AUTO: 20.49 K/UL — HIGH (ref 4.8–10.8)
WBC # FLD AUTO: 20.53 K/UL — HIGH (ref 4.8–10.8)
WBC # FLD AUTO: 21.46 K/UL — HIGH (ref 4.8–10.8)
WBC # FLD AUTO: 21.76 K/UL — HIGH (ref 4.8–10.8)
WBC # FLD AUTO: 22.42 K/UL — HIGH (ref 4.8–10.8)
WBC # FLD AUTO: 22.44 K/UL — HIGH (ref 4.8–10.8)
WBC # FLD AUTO: 22.7 K/UL — HIGH (ref 4.8–10.8)
WBC # FLD AUTO: 24.12 K/UL — HIGH (ref 4.8–10.8)
WBC # FLD AUTO: 24.61 K/UL — HIGH (ref 4.8–10.8)
WBC # FLD AUTO: 24.87 K/UL — HIGH (ref 4.8–10.8)
WBC # FLD AUTO: 25.2 K/UL — HIGH (ref 4.8–10.8)
WBC # FLD AUTO: 25.26 K/UL — HIGH (ref 4.8–10.8)
WBC # FLD AUTO: 28.61 K/UL — HIGH (ref 4.8–10.8)
WBC # FLD AUTO: 3.2 K/UL — LOW (ref 4.8–10.8)
WBC # FLD AUTO: 3.53 K/UL — LOW (ref 4.8–10.8)
WBC # FLD AUTO: 3.64 K/UL — LOW (ref 4.8–10.8)
WBC # FLD AUTO: 3.7 K/UL — LOW (ref 4.8–10.8)
WBC # FLD AUTO: 4.01 K/UL — LOW (ref 4.8–10.8)
WBC # FLD AUTO: 4.03 K/UL — LOW (ref 4.8–10.8)
WBC # FLD AUTO: 4.35 K/UL — LOW (ref 4.8–10.8)
WBC # FLD AUTO: 4.4 K/UL — LOW (ref 4.8–10.8)
WBC # FLD AUTO: 4.57 K/UL — LOW (ref 4.8–10.8)
WBC # FLD AUTO: 4.87 K/UL — SIGNIFICANT CHANGE UP (ref 4.8–10.8)
WBC # FLD AUTO: 4.95 K/UL — SIGNIFICANT CHANGE UP (ref 4.8–10.8)
WBC # FLD AUTO: 5.18 K/UL — SIGNIFICANT CHANGE UP (ref 4.8–10.8)
WBC # FLD AUTO: 5.59 K/UL — SIGNIFICANT CHANGE UP (ref 4.8–10.8)
WBC # FLD AUTO: 5.61 K/UL — SIGNIFICANT CHANGE UP (ref 4.8–10.8)
WBC # FLD AUTO: 6.13 K/UL — SIGNIFICANT CHANGE UP (ref 4.8–10.8)
WBC # FLD AUTO: 6.5 K/UL — SIGNIFICANT CHANGE UP (ref 4.8–10.8)
WBC # FLD AUTO: 6.55 K/UL — SIGNIFICANT CHANGE UP (ref 4.8–10.8)
WBC # FLD AUTO: 6.56 K/UL — SIGNIFICANT CHANGE UP (ref 4.8–10.8)
WBC # FLD AUTO: 6.99 K/UL — SIGNIFICANT CHANGE UP (ref 4.8–10.8)
WBC # FLD AUTO: 8.6 K/UL — SIGNIFICANT CHANGE UP (ref 4.8–10.8)
WBC # FLD AUTO: 8.92 K/UL — SIGNIFICANT CHANGE UP (ref 4.8–10.8)
WBC # FLD AUTO: 9.02 K/UL — SIGNIFICANT CHANGE UP (ref 4.8–10.8)
WBC # FLD AUTO: 9.06 K/UL — SIGNIFICANT CHANGE UP (ref 4.8–10.8)
WBC # FLD AUTO: 9.81 K/UL — SIGNIFICANT CHANGE UP (ref 4.8–10.8)
WBC UR QL: 157 /HPF — HIGH (ref 0–5)
WBC UR QL: 9 /HPF — HIGH (ref 0–5)

## 2020-01-01 PROCEDURE — 37191 INS ENDOVAS VENA CAVA FILTR: CPT

## 2020-01-01 PROCEDURE — 99232 SBSQ HOSP IP/OBS MODERATE 35: CPT

## 2020-01-01 PROCEDURE — 93970 EXTREMITY STUDY: CPT | Mod: 26

## 2020-01-01 PROCEDURE — 71045 X-RAY EXAM CHEST 1 VIEW: CPT | Mod: 26,76

## 2020-01-01 PROCEDURE — 99232 SBSQ HOSP IP/OBS MODERATE 35: CPT | Mod: 57,25

## 2020-01-01 PROCEDURE — 71045 X-RAY EXAM CHEST 1 VIEW: CPT | Mod: 26

## 2020-01-01 PROCEDURE — 71275 CT ANGIOGRAPHY CHEST: CPT | Mod: 26

## 2020-01-01 PROCEDURE — 93010 ELECTROCARDIOGRAM REPORT: CPT | Mod: 77

## 2020-01-01 PROCEDURE — 93306 TTE W/DOPPLER COMPLETE: CPT | Mod: 26

## 2020-01-01 PROCEDURE — 71045 X-RAY EXAM CHEST 1 VIEW: CPT | Mod: 26,77

## 2020-01-01 PROCEDURE — 99233 SBSQ HOSP IP/OBS HIGH 50: CPT

## 2020-01-01 PROCEDURE — 93010 ELECTROCARDIOGRAM REPORT: CPT

## 2020-01-01 PROCEDURE — 99291 CRITICAL CARE FIRST HOUR: CPT | Mod: GC

## 2020-01-01 PROCEDURE — 99291 CRITICAL CARE FIRST HOUR: CPT

## 2020-01-01 PROCEDURE — 45330 DIAGNOSTIC SIGMOIDOSCOPY: CPT

## 2020-01-01 PROCEDURE — 76705 ECHO EXAM OF ABDOMEN: CPT | Mod: 26

## 2020-01-01 PROCEDURE — 73030 X-RAY EXAM OF SHOULDER: CPT | Mod: 26,LT

## 2020-01-01 PROCEDURE — 88305 TISSUE EXAM BY PATHOLOGIST: CPT | Mod: 26

## 2020-01-01 PROCEDURE — 99221 1ST HOSP IP/OBS SF/LOW 40: CPT

## 2020-01-01 PROCEDURE — 99285 EMERGENCY DEPT VISIT HI MDM: CPT | Mod: GC

## 2020-01-01 PROCEDURE — 88112 CYTOPATH CELL ENHANCE TECH: CPT | Mod: 26

## 2020-01-01 PROCEDURE — 99233 SBSQ HOSP IP/OBS HIGH 50: CPT | Mod: 25

## 2020-01-01 PROCEDURE — 99231 SBSQ HOSP IP/OBS SF/LOW 25: CPT

## 2020-01-01 PROCEDURE — 76775 US EXAM ABDO BACK WALL LIM: CPT | Mod: 26

## 2020-01-01 PROCEDURE — 45382 COLONOSCOPY W/CONTROL BLEED: CPT

## 2020-01-01 PROCEDURE — 99222 1ST HOSP IP/OBS MODERATE 55: CPT

## 2020-01-01 PROCEDURE — 16025 DRESS/DEBRID P-THICK BURN M: CPT

## 2020-01-01 PROCEDURE — 99223 1ST HOSP IP/OBS HIGH 75: CPT

## 2020-01-01 PROCEDURE — 72070 X-RAY EXAM THORAC SPINE 2VWS: CPT | Mod: 26

## 2020-01-01 PROCEDURE — 99213 OFFICE O/P EST LOW 20 MIN: CPT | Mod: 25

## 2020-01-01 PROCEDURE — 88312 SPECIAL STAINS GROUP 1: CPT | Mod: 26

## 2020-01-01 PROCEDURE — 72110 X-RAY EXAM L-2 SPINE 4/>VWS: CPT | Mod: 26

## 2020-01-01 PROCEDURE — 70450 CT HEAD/BRAIN W/O DYE: CPT | Mod: 26

## 2020-01-01 RX ORDER — PREGABALIN 225 MG/1
1000 CAPSULE ORAL DAILY
Refills: 0 | Status: DISCONTINUED | OUTPATIENT
Start: 2020-01-01 | End: 2020-01-01

## 2020-01-01 RX ORDER — OXYCODONE HYDROCHLORIDE 5 MG/1
5 TABLET ORAL EVERY 4 HOURS
Refills: 0 | Status: DISCONTINUED | OUTPATIENT
Start: 2020-01-01 | End: 2020-01-01

## 2020-01-01 RX ORDER — MIDAZOLAM HYDROCHLORIDE 1 MG/ML
2 INJECTION, SOLUTION INTRAMUSCULAR; INTRAVENOUS ONCE
Refills: 0 | Status: DISCONTINUED | OUTPATIENT
Start: 2020-01-01 | End: 2020-01-01

## 2020-01-01 RX ORDER — PANTOPRAZOLE SODIUM 20 MG/1
8 TABLET, DELAYED RELEASE ORAL
Qty: 80 | Refills: 0 | Status: DISCONTINUED | OUTPATIENT
Start: 2020-01-01 | End: 2020-01-01

## 2020-01-01 RX ORDER — INSULIN HUMAN 100 [IU]/ML
0.1 INJECTION, SOLUTION SUBCUTANEOUS
Qty: 100 | Refills: 0 | Status: DISCONTINUED | OUTPATIENT
Start: 2020-01-01 | End: 2020-01-01

## 2020-01-01 RX ORDER — LIDOCAINE 4 G/100G
1 CREAM TOPICAL
Qty: 0 | Refills: 0 | DISCHARGE
Start: 2020-01-01

## 2020-01-01 RX ORDER — DEXMEDETOMIDINE HYDROCHLORIDE IN 0.9% SODIUM CHLORIDE 4 UG/ML
0.5 INJECTION INTRAVENOUS
Qty: 200 | Refills: 0 | Status: DISCONTINUED | OUTPATIENT
Start: 2020-01-01 | End: 2020-01-01

## 2020-01-01 RX ORDER — DEXTROSE 50 % IN WATER 50 %
12.5 SYRINGE (ML) INTRAVENOUS ONCE
Refills: 0 | Status: DISCONTINUED | OUTPATIENT
Start: 2020-01-01 | End: 2020-01-01

## 2020-01-01 RX ORDER — INSULIN LISPRO 100/ML
VIAL (ML) SUBCUTANEOUS
Refills: 0 | Status: DISCONTINUED | OUTPATIENT
Start: 2020-01-01 | End: 2020-01-01

## 2020-01-01 RX ORDER — SODIUM CHLORIDE 9 MG/ML
1000 INJECTION, SOLUTION INTRAVENOUS
Refills: 0 | Status: DISCONTINUED | OUTPATIENT
Start: 2020-01-01 | End: 2020-01-01

## 2020-01-01 RX ORDER — SODIUM BICARBONATE 1 MEQ/ML
650 SYRINGE (ML) INTRAVENOUS EVERY 6 HOURS
Refills: 0 | Status: DISCONTINUED | OUTPATIENT
Start: 2020-01-01 | End: 2020-01-01

## 2020-01-01 RX ORDER — MAGNESIUM SULFATE 500 MG/ML
2 VIAL (ML) INJECTION
Refills: 0 | Status: COMPLETED | OUTPATIENT
Start: 2020-01-01 | End: 2020-01-01

## 2020-01-01 RX ORDER — SODIUM CHLORIDE 9 MG/ML
500 INJECTION, SOLUTION INTRAVENOUS ONCE
Refills: 0 | Status: COMPLETED | OUTPATIENT
Start: 2020-01-01 | End: 2020-01-01

## 2020-01-01 RX ORDER — ACETAMINOPHEN 500 MG
650 TABLET ORAL EVERY 6 HOURS
Refills: 0 | Status: DISCONTINUED | OUTPATIENT
Start: 2020-01-01 | End: 2020-01-01

## 2020-01-01 RX ORDER — FENTANYL CITRATE 50 UG/ML
50 INJECTION INTRAVENOUS ONCE
Refills: 0 | Status: DISCONTINUED | OUTPATIENT
Start: 2020-01-01 | End: 2020-01-01

## 2020-01-01 RX ORDER — OXYCODONE HYDROCHLORIDE 5 MG/1
10 TABLET ORAL EVERY 8 HOURS
Refills: 0 | Status: DISCONTINUED | OUTPATIENT
Start: 2020-01-01 | End: 2020-01-01

## 2020-01-01 RX ORDER — FUROSEMIDE 40 MG
80 TABLET ORAL ONCE
Refills: 0 | Status: COMPLETED | OUTPATIENT
Start: 2020-01-01 | End: 2020-01-01

## 2020-01-01 RX ORDER — TIOTROPIUM BROMIDE 18 UG/1
1 CAPSULE ORAL; RESPIRATORY (INHALATION) DAILY
Refills: 0 | Status: DISCONTINUED | OUTPATIENT
Start: 2020-01-01 | End: 2020-01-01

## 2020-01-01 RX ORDER — MUPIROCIN 20 MG/G
1 OINTMENT TOPICAL
Refills: 0 | Status: COMPLETED | OUTPATIENT
Start: 2020-01-01 | End: 2020-01-01

## 2020-01-01 RX ORDER — SODIUM CHLORIDE 9 MG/ML
250 INJECTION INTRAMUSCULAR; INTRAVENOUS; SUBCUTANEOUS ONCE
Refills: 0 | Status: COMPLETED | OUTPATIENT
Start: 2020-01-01 | End: 2020-01-01

## 2020-01-01 RX ORDER — PANTOPRAZOLE SODIUM 20 MG/1
40 TABLET, DELAYED RELEASE ORAL DAILY
Refills: 0 | Status: DISCONTINUED | OUTPATIENT
Start: 2020-01-01 | End: 2020-01-01

## 2020-01-01 RX ORDER — ALBUTEROL 90 UG/1
4 AEROSOL, METERED ORAL EVERY 6 HOURS
Refills: 0 | Status: DISCONTINUED | OUTPATIENT
Start: 2020-01-01 | End: 2020-01-01

## 2020-01-01 RX ORDER — MAGNESIUM SULFATE 500 MG/ML
2 VIAL (ML) INJECTION ONCE
Refills: 0 | Status: COMPLETED | OUTPATIENT
Start: 2020-01-01 | End: 2020-01-01

## 2020-01-01 RX ORDER — GABAPENTIN 400 MG/1
300 CAPSULE ORAL AT BEDTIME
Refills: 0 | Status: DISCONTINUED | OUTPATIENT
Start: 2020-01-01 | End: 2020-01-01

## 2020-01-01 RX ORDER — MIDAZOLAM HYDROCHLORIDE 1 MG/ML
0.02 INJECTION, SOLUTION INTRAMUSCULAR; INTRAVENOUS
Qty: 100 | Refills: 0 | Status: DISCONTINUED | OUTPATIENT
Start: 2020-01-01 | End: 2020-01-01

## 2020-01-01 RX ORDER — FUROSEMIDE 40 MG
20 TABLET ORAL DAILY
Refills: 0 | Status: DISCONTINUED | OUTPATIENT
Start: 2020-01-01 | End: 2020-01-01

## 2020-01-01 RX ORDER — LACTULOSE 10 G/15ML
10 SOLUTION ORAL
Refills: 0 | Status: DISCONTINUED | OUTPATIENT
Start: 2020-01-01 | End: 2020-01-01

## 2020-01-01 RX ORDER — DEXMEDETOMIDINE HYDROCHLORIDE IN 0.9% SODIUM CHLORIDE 4 UG/ML
0.05 INJECTION INTRAVENOUS
Qty: 200 | Refills: 0 | Status: DISCONTINUED | OUTPATIENT
Start: 2020-01-01 | End: 2020-01-01

## 2020-01-01 RX ORDER — INSULIN LISPRO 100/ML
8 VIAL (ML) SUBCUTANEOUS ONCE
Refills: 0 | Status: COMPLETED | OUTPATIENT
Start: 2020-01-01 | End: 2020-01-01

## 2020-01-01 RX ORDER — INSULIN GLARGINE 100 [IU]/ML
18 INJECTION, SOLUTION SUBCUTANEOUS AT BEDTIME
Refills: 0 | Status: DISCONTINUED | OUTPATIENT
Start: 2020-01-01 | End: 2020-01-01

## 2020-01-01 RX ORDER — DEXTROSE 50 % IN WATER 50 %
15 SYRINGE (ML) INTRAVENOUS ONCE
Refills: 0 | Status: DISCONTINUED | OUTPATIENT
Start: 2020-01-01 | End: 2020-01-01

## 2020-01-01 RX ORDER — CASPOFUNGIN ACETATE 7 MG/ML
50 INJECTION, POWDER, LYOPHILIZED, FOR SOLUTION INTRAVENOUS EVERY 24 HOURS
Refills: 0 | Status: COMPLETED | OUTPATIENT
Start: 2020-01-01 | End: 2020-01-01

## 2020-01-01 RX ORDER — NOREPINEPHRINE BITARTRATE/D5W 8 MG/250ML
0.05 PLASTIC BAG, INJECTION (ML) INTRAVENOUS
Qty: 8 | Refills: 0 | Status: DISCONTINUED | OUTPATIENT
Start: 2020-01-01 | End: 2020-01-01

## 2020-01-01 RX ORDER — DEXTROSE 50 % IN WATER 50 %
50 SYRINGE (ML) INTRAVENOUS
Refills: 0 | Status: DISCONTINUED | OUTPATIENT
Start: 2020-01-01 | End: 2020-01-01

## 2020-01-01 RX ORDER — VANCOMYCIN HCL 1 G
1000 VIAL (EA) INTRAVENOUS ONCE
Refills: 0 | Status: COMPLETED | OUTPATIENT
Start: 2020-01-01 | End: 2020-01-01

## 2020-01-01 RX ORDER — POTASSIUM CHLORIDE 20 MEQ
40 PACKET (EA) ORAL EVERY 6 HOURS
Refills: 0 | Status: COMPLETED | OUTPATIENT
Start: 2020-01-01 | End: 2020-01-01

## 2020-01-01 RX ORDER — ONDANSETRON 8 MG/1
4 TABLET, FILM COATED ORAL ONCE
Refills: 0 | Status: COMPLETED | OUTPATIENT
Start: 2020-01-01 | End: 2020-01-01

## 2020-01-01 RX ORDER — METOPROLOL TARTRATE 50 MG
5 TABLET ORAL ONCE
Refills: 0 | Status: COMPLETED | OUTPATIENT
Start: 2020-01-01 | End: 2020-01-01

## 2020-01-01 RX ORDER — FLUCONAZOLE 150 MG/1
400 TABLET ORAL DAILY
Refills: 0 | Status: DISCONTINUED | OUTPATIENT
Start: 2020-01-01 | End: 2020-01-01

## 2020-01-01 RX ORDER — CISATRACURIUM BESYLATE 2 MG/ML
10 INJECTION INTRAVENOUS ONCE
Refills: 0 | Status: COMPLETED | OUTPATIENT
Start: 2020-01-01 | End: 2020-01-01

## 2020-01-01 RX ORDER — AZITHROMYCIN 500 MG/1
500 TABLET, FILM COATED ORAL ONCE
Refills: 0 | Status: COMPLETED | OUTPATIENT
Start: 2020-01-01 | End: 2020-01-01

## 2020-01-01 RX ORDER — PIPERACILLIN AND TAZOBACTAM 4; .5 G/20ML; G/20ML
3.38 INJECTION, POWDER, LYOPHILIZED, FOR SOLUTION INTRAVENOUS ONCE
Refills: 0 | Status: DISCONTINUED | OUTPATIENT
Start: 2020-01-01 | End: 2020-01-01

## 2020-01-01 RX ORDER — CHLORHEXIDINE GLUCONATE 213 G/1000ML
1 SOLUTION TOPICAL DAILY
Refills: 0 | Status: DISCONTINUED | OUTPATIENT
Start: 2020-01-01 | End: 2020-01-01

## 2020-01-01 RX ORDER — AMLODIPINE BESYLATE 2.5 MG/1
10 TABLET ORAL AT BEDTIME
Refills: 0 | Status: DISCONTINUED | OUTPATIENT
Start: 2020-01-01 | End: 2020-01-01

## 2020-01-01 RX ORDER — TIOTROPIUM BROMIDE 18 UG/1
1 CAPSULE ORAL; RESPIRATORY (INHALATION)
Qty: 0 | Refills: 0 | DISCHARGE
Start: 2020-01-01

## 2020-01-01 RX ORDER — AMLODIPINE BESYLATE 2.5 MG/1
5 TABLET ORAL DAILY
Refills: 0 | Status: DISCONTINUED | OUTPATIENT
Start: 2020-01-01 | End: 2020-01-01

## 2020-01-01 RX ORDER — INSULIN LISPRO 100/ML
11 VIAL (ML) SUBCUTANEOUS EVERY 6 HOURS
Refills: 0 | Status: DISCONTINUED | OUTPATIENT
Start: 2020-01-01 | End: 2020-01-01

## 2020-01-01 RX ORDER — PANTOPRAZOLE SODIUM 20 MG/1
40 TABLET, DELAYED RELEASE ORAL
Refills: 0 | Status: DISCONTINUED | OUTPATIENT
Start: 2020-01-01 | End: 2020-01-01

## 2020-01-01 RX ORDER — FENTANYL CITRATE 50 UG/ML
0.5 INJECTION INTRAVENOUS
Qty: 2500 | Refills: 0 | Status: DISCONTINUED | OUTPATIENT
Start: 2020-01-01 | End: 2020-01-01

## 2020-01-01 RX ORDER — SODIUM BICARBONATE 1 MEQ/ML
0.09 SYRINGE (ML) INTRAVENOUS
Qty: 150 | Refills: 0 | Status: DISCONTINUED | OUTPATIENT
Start: 2020-01-01 | End: 2020-01-01

## 2020-01-01 RX ORDER — INSULIN LISPRO 100/ML
VIAL (ML) SUBCUTANEOUS EVERY 6 HOURS
Refills: 0 | Status: DISCONTINUED | OUTPATIENT
Start: 2020-01-01 | End: 2020-01-01

## 2020-01-01 RX ORDER — CHLORHEXIDINE GLUCONATE 213 G/1000ML
15 SOLUTION TOPICAL
Refills: 0 | Status: DISCONTINUED | OUTPATIENT
Start: 2020-01-01 | End: 2020-01-01

## 2020-01-01 RX ORDER — MIDAZOLAM HYDROCHLORIDE 1 MG/ML
4 INJECTION, SOLUTION INTRAMUSCULAR; INTRAVENOUS ONCE
Refills: 0 | Status: DISCONTINUED | OUTPATIENT
Start: 2020-01-01 | End: 2020-01-01

## 2020-01-01 RX ORDER — INSULIN GLARGINE 100 [IU]/ML
50 INJECTION, SOLUTION SUBCUTANEOUS EVERY MORNING
Refills: 0 | Status: DISCONTINUED | OUTPATIENT
Start: 2020-01-01 | End: 2020-01-01

## 2020-01-01 RX ORDER — DEXMEDETOMIDINE HYDROCHLORIDE IN 0.9% SODIUM CHLORIDE 4 UG/ML
0.2 INJECTION INTRAVENOUS
Qty: 200 | Refills: 0 | Status: DISCONTINUED | OUTPATIENT
Start: 2020-01-01 | End: 2020-01-01

## 2020-01-01 RX ORDER — DESMOPRESSIN ACETATE 0.1 MG/1
24 TABLET ORAL EVERY 12 HOURS
Refills: 0 | Status: DISCONTINUED | OUTPATIENT
Start: 2020-01-01 | End: 2020-01-01

## 2020-01-01 RX ORDER — WARFARIN SODIUM 2.5 MG/1
1 TABLET ORAL
Qty: 0 | Refills: 0 | DISCHARGE
Start: 2020-01-01

## 2020-01-01 RX ORDER — SODIUM CHLORIDE 9 MG/ML
1000 INJECTION, SOLUTION INTRAVENOUS ONCE
Refills: 0 | Status: COMPLETED | OUTPATIENT
Start: 2020-01-01 | End: 2020-01-01

## 2020-01-01 RX ORDER — KETOROLAC TROMETHAMINE 30 MG/ML
15 SYRINGE (ML) INJECTION EVERY 6 HOURS
Refills: 0 | Status: DISCONTINUED | OUTPATIENT
Start: 2020-01-01 | End: 2020-01-01

## 2020-01-01 RX ORDER — INFLUENZA VIRUS VACCINE 15; 15; 15; 15 UG/.5ML; UG/.5ML; UG/.5ML; UG/.5ML
0.5 SUSPENSION INTRAMUSCULAR ONCE
Refills: 0 | Status: COMPLETED | OUTPATIENT
Start: 2020-01-01 | End: 2020-01-01

## 2020-01-01 RX ORDER — GABAPENTIN 400 MG/1
1 CAPSULE ORAL
Qty: 0 | Refills: 0 | DISCHARGE

## 2020-01-01 RX ORDER — CHLORHEXIDINE GLUCONATE 213 G/1000ML
1 SOLUTION TOPICAL
Refills: 0 | Status: DISCONTINUED | OUTPATIENT
Start: 2020-01-01 | End: 2020-01-01

## 2020-01-01 RX ORDER — CALCITRIOL 0.5 UG/1
0.25 CAPSULE ORAL DAILY
Refills: 0 | Status: DISCONTINUED | OUTPATIENT
Start: 2020-01-01 | End: 2020-01-01

## 2020-01-01 RX ORDER — POTASSIUM CHLORIDE 20 MEQ
20 PACKET (EA) ORAL
Refills: 0 | Status: COMPLETED | OUTPATIENT
Start: 2020-01-01 | End: 2020-01-01

## 2020-01-01 RX ORDER — AMLODIPINE BESYLATE 2.5 MG/1
1 TABLET ORAL
Qty: 0 | Refills: 0 | DISCHARGE
Start: 2020-01-01

## 2020-01-01 RX ORDER — GABAPENTIN 400 MG/1
1 CAPSULE ORAL
Qty: 0 | Refills: 0 | DISCHARGE
Start: 2020-01-01

## 2020-01-01 RX ORDER — INSULIN LISPRO 100/ML
10 VIAL (ML) SUBCUTANEOUS ONCE
Refills: 0 | Status: COMPLETED | OUTPATIENT
Start: 2020-01-01 | End: 2020-01-01

## 2020-01-01 RX ORDER — CASPOFUNGIN ACETATE 7 MG/ML
50 INJECTION, POWDER, LYOPHILIZED, FOR SOLUTION INTRAVENOUS EVERY 24 HOURS
Refills: 0 | Status: DISCONTINUED | OUTPATIENT
Start: 2020-01-01 | End: 2020-01-01

## 2020-01-01 RX ORDER — FLUCONAZOLE 150 MG/1
400 TABLET ORAL EVERY 24 HOURS
Refills: 0 | Status: DISCONTINUED | OUTPATIENT
Start: 2020-01-01 | End: 2020-01-01

## 2020-01-01 RX ORDER — LIDOCAINE 4 G/100G
1 CREAM TOPICAL DAILY
Refills: 0 | Status: DISCONTINUED | OUTPATIENT
Start: 2020-01-01 | End: 2020-01-01

## 2020-01-01 RX ORDER — FERROUS SULFATE 325(65) MG
325 TABLET ORAL DAILY
Refills: 0 | Status: DISCONTINUED | OUTPATIENT
Start: 2020-01-01 | End: 2020-01-01

## 2020-01-01 RX ORDER — INSULIN HUMAN 100 [IU]/ML
1 INJECTION, SOLUTION SUBCUTANEOUS
Qty: 100 | Refills: 0 | Status: DISCONTINUED | OUTPATIENT
Start: 2020-01-01 | End: 2020-01-01

## 2020-01-01 RX ORDER — INFLUENZA VIRUS VACCINE 15; 15; 15; 15 UG/.5ML; UG/.5ML; UG/.5ML; UG/.5ML
0.5 SUSPENSION INTRAMUSCULAR ONCE
Refills: 0 | Status: DISCONTINUED | OUTPATIENT
Start: 2020-01-01 | End: 2020-01-01

## 2020-01-01 RX ORDER — INSULIN LISPRO 100/ML
VIAL (ML) SUBCUTANEOUS EVERY 4 HOURS
Refills: 0 | Status: DISCONTINUED | OUTPATIENT
Start: 2020-01-01 | End: 2020-01-01

## 2020-01-01 RX ORDER — FENTANYL CITRATE 50 UG/ML
100 INJECTION INTRAVENOUS ONCE
Refills: 0 | Status: DISCONTINUED | OUTPATIENT
Start: 2020-01-01 | End: 2020-01-01

## 2020-01-01 RX ORDER — DEXTROSE 50 % IN WATER 50 %
25 SYRINGE (ML) INTRAVENOUS ONCE
Refills: 0 | Status: DISCONTINUED | OUTPATIENT
Start: 2020-01-01 | End: 2020-01-01

## 2020-01-01 RX ORDER — CASPOFUNGIN ACETATE 7 MG/ML
70 INJECTION, POWDER, LYOPHILIZED, FOR SOLUTION INTRAVENOUS ONCE
Refills: 0 | Status: COMPLETED | OUTPATIENT
Start: 2020-01-01 | End: 2020-01-01

## 2020-01-01 RX ORDER — VANCOMYCIN HCL 1 G
1250 VIAL (EA) INTRAVENOUS EVERY 12 HOURS
Refills: 0 | Status: DISCONTINUED | OUTPATIENT
Start: 2020-01-01 | End: 2020-01-01

## 2020-01-01 RX ORDER — SODIUM BICARBONATE 1 MEQ/ML
0.14 SYRINGE (ML) INTRAVENOUS
Qty: 150 | Refills: 0 | Status: DISCONTINUED | OUTPATIENT
Start: 2020-01-01 | End: 2020-01-01

## 2020-01-01 RX ORDER — POTASSIUM CHLORIDE 20 MEQ
20 PACKET (EA) ORAL ONCE
Refills: 0 | Status: COMPLETED | OUTPATIENT
Start: 2020-01-01 | End: 2020-01-01

## 2020-01-01 RX ORDER — PROPOFOL 10 MG/ML
0.5 INJECTION, EMULSION INTRAVENOUS
Qty: 1000 | Refills: 0 | Status: DISCONTINUED | OUTPATIENT
Start: 2020-01-01 | End: 2020-01-01

## 2020-01-01 RX ORDER — CISATRACURIUM BESYLATE 2 MG/ML
3 INJECTION INTRAVENOUS
Qty: 200 | Refills: 0 | Status: DISCONTINUED | OUTPATIENT
Start: 2020-01-01 | End: 2020-01-01

## 2020-01-01 RX ORDER — SODIUM BICARBONATE 1 MEQ/ML
0.92 SYRINGE (ML) INTRAVENOUS
Qty: 150 | Refills: 0 | Status: COMPLETED | OUTPATIENT
Start: 2020-01-01 | End: 2020-01-01

## 2020-01-01 RX ORDER — MUPIROCIN 20 MG/G
1 OINTMENT TOPICAL
Refills: 0 | Status: DISCONTINUED | OUTPATIENT
Start: 2020-01-01 | End: 2020-01-01

## 2020-01-01 RX ORDER — CISATRACURIUM BESYLATE 2 MG/ML
8 INJECTION INTRAVENOUS ONCE
Refills: 0 | Status: COMPLETED | OUTPATIENT
Start: 2020-01-01 | End: 2020-01-01

## 2020-01-01 RX ORDER — INSULIN GLARGINE 100 [IU]/ML
45 INJECTION, SOLUTION SUBCUTANEOUS AT BEDTIME
Refills: 0 | Status: DISCONTINUED | OUTPATIENT
Start: 2020-01-01 | End: 2020-01-01

## 2020-01-01 RX ORDER — MEROPENEM 1 G/30ML
1000 INJECTION INTRAVENOUS EVERY 8 HOURS
Refills: 0 | Status: DISCONTINUED | OUTPATIENT
Start: 2020-01-01 | End: 2020-01-01

## 2020-01-01 RX ORDER — AMLODIPINE BESYLATE 2.5 MG/1
10 TABLET ORAL DAILY
Refills: 0 | Status: DISCONTINUED | OUTPATIENT
Start: 2020-01-01 | End: 2020-01-01

## 2020-01-01 RX ORDER — MULTIVIT-MIN/FERROUS GLUCONATE 9 MG/15 ML
1 LIQUID (ML) ORAL DAILY
Refills: 0 | Status: DISCONTINUED | OUTPATIENT
Start: 2020-01-01 | End: 2020-01-01

## 2020-01-01 RX ORDER — OXYCODONE HYDROCHLORIDE 5 MG/1
10 TABLET ORAL EVERY 6 HOURS
Refills: 0 | Status: DISCONTINUED | OUTPATIENT
Start: 2020-01-01 | End: 2020-01-01

## 2020-01-01 RX ORDER — WARFARIN SODIUM 2.5 MG/1
4 TABLET ORAL ONCE
Refills: 0 | Status: COMPLETED | OUTPATIENT
Start: 2020-01-01 | End: 2020-01-01

## 2020-01-01 RX ORDER — DILTIAZEM HCL 120 MG
10 CAPSULE, EXT RELEASE 24 HR ORAL ONCE
Refills: 0 | Status: DISCONTINUED | OUTPATIENT
Start: 2020-01-01 | End: 2020-01-01

## 2020-01-01 RX ORDER — VANCOMYCIN HCL 1 G
1000 VIAL (EA) INTRAVENOUS EVERY 12 HOURS
Refills: 0 | Status: DISCONTINUED | OUTPATIENT
Start: 2020-01-01 | End: 2020-01-01

## 2020-01-01 RX ORDER — MEROPENEM 1 G/30ML
INJECTION INTRAVENOUS
Refills: 0 | Status: DISCONTINUED | OUTPATIENT
Start: 2020-01-01 | End: 2020-01-01

## 2020-01-01 RX ORDER — IPRATROPIUM/ALBUTEROL SULFATE 18-103MCG
3 AEROSOL WITH ADAPTER (GRAM) INHALATION ONCE
Refills: 0 | Status: COMPLETED | OUTPATIENT
Start: 2020-01-01 | End: 2020-01-01

## 2020-01-01 RX ORDER — DEXTROSE 10 % IN WATER 10 %
250 INTRAVENOUS SOLUTION INTRAVENOUS
Refills: 0 | Status: DISCONTINUED | OUTPATIENT
Start: 2020-01-01 | End: 2020-01-01

## 2020-01-01 RX ORDER — SODIUM CHLORIDE 9 MG/ML
250 INJECTION INTRAMUSCULAR; INTRAVENOUS; SUBCUTANEOUS ONCE
Refills: 0 | Status: DISCONTINUED | OUTPATIENT
Start: 2020-01-01 | End: 2020-01-01

## 2020-01-01 RX ORDER — INSULIN HUMAN 100 [IU]/ML
10 INJECTION, SOLUTION SUBCUTANEOUS ONCE
Refills: 0 | Status: COMPLETED | OUTPATIENT
Start: 2020-01-01 | End: 2020-01-01

## 2020-01-01 RX ORDER — INSULIN HUMAN 100 [IU]/ML
4 INJECTION, SOLUTION SUBCUTANEOUS
Qty: 100 | Refills: 0 | Status: DISCONTINUED | OUTPATIENT
Start: 2020-01-01 | End: 2020-01-01

## 2020-01-01 RX ORDER — INSULIN LISPRO 100/ML
6 VIAL (ML) SUBCUTANEOUS
Refills: 0 | Status: DISCONTINUED | OUTPATIENT
Start: 2020-01-01 | End: 2020-01-01

## 2020-01-01 RX ORDER — INSULIN GLARGINE 100 [IU]/ML
45 INJECTION, SOLUTION SUBCUTANEOUS ONCE
Refills: 0 | Status: COMPLETED | OUTPATIENT
Start: 2020-01-01 | End: 2020-01-01

## 2020-01-01 RX ORDER — INSULIN HUMAN 100 [IU]/ML
4 INJECTION, SOLUTION SUBCUTANEOUS ONCE
Refills: 0 | Status: COMPLETED | OUTPATIENT
Start: 2020-01-01 | End: 2020-01-01

## 2020-01-01 RX ORDER — FENTANYL CITRATE 50 UG/ML
0.52 INJECTION INTRAVENOUS
Qty: 2500 | Refills: 0 | Status: DISCONTINUED | OUTPATIENT
Start: 2020-01-01 | End: 2020-01-01

## 2020-01-01 RX ORDER — ALBUTEROL 90 UG/1
1 AEROSOL, METERED ORAL EVERY 4 HOURS
Refills: 0 | Status: DISCONTINUED | OUTPATIENT
Start: 2020-01-01 | End: 2020-01-01

## 2020-01-01 RX ORDER — DESMOPRESSIN ACETATE 0.1 MG/1
24 TABLET ORAL EVERY 12 HOURS
Refills: 0 | Status: COMPLETED | OUTPATIENT
Start: 2020-01-01 | End: 2020-01-01

## 2020-01-01 RX ORDER — SENNA PLUS 8.6 MG/1
2 TABLET ORAL AT BEDTIME
Refills: 0 | Status: DISCONTINUED | OUTPATIENT
Start: 2020-01-01 | End: 2020-01-01

## 2020-01-01 RX ORDER — MEPERIDINE HYDROCHLORIDE 50 MG/ML
50 INJECTION INTRAMUSCULAR; INTRAVENOUS; SUBCUTANEOUS ONCE
Refills: 0 | Status: DISCONTINUED | OUTPATIENT
Start: 2020-01-01 | End: 2020-01-01

## 2020-01-01 RX ORDER — ALBUTEROL 90 UG/1
1 AEROSOL, METERED ORAL EVERY 6 HOURS
Refills: 0 | Status: DISCONTINUED | OUTPATIENT
Start: 2020-01-01 | End: 2020-01-01

## 2020-01-01 RX ORDER — LINEZOLID 600 MG/300ML
600 INJECTION, SOLUTION INTRAVENOUS EVERY 12 HOURS
Refills: 0 | Status: DISCONTINUED | OUTPATIENT
Start: 2020-01-01 | End: 2020-01-01

## 2020-01-01 RX ORDER — MULTIVIT-MIN/FERROUS GLUCONATE 9 MG/15 ML
1 LIQUID (ML) ORAL
Qty: 0 | Refills: 0 | DISCHARGE
Start: 2020-01-01

## 2020-01-01 RX ORDER — MORPHINE SULFATE 50 MG/1
4 CAPSULE, EXTENDED RELEASE ORAL
Qty: 100 | Refills: 0 | Status: DISCONTINUED | OUTPATIENT
Start: 2020-01-01 | End: 2020-01-01

## 2020-01-01 RX ORDER — FUROSEMIDE 40 MG
40 TABLET ORAL ONCE
Refills: 0 | Status: DISCONTINUED | OUTPATIENT
Start: 2020-01-01 | End: 2020-01-01

## 2020-01-01 RX ORDER — FUROSEMIDE 40 MG
40 TABLET ORAL EVERY 12 HOURS
Refills: 0 | Status: DISCONTINUED | OUTPATIENT
Start: 2020-01-01 | End: 2020-01-01

## 2020-01-01 RX ORDER — VANCOMYCIN HCL 1 G
1500 VIAL (EA) INTRAVENOUS ONCE
Refills: 0 | Status: COMPLETED | OUTPATIENT
Start: 2020-01-01 | End: 2020-01-01

## 2020-01-01 RX ORDER — MIDAZOLAM HYDROCHLORIDE 1 MG/ML
0.01 INJECTION, SOLUTION INTRAMUSCULAR; INTRAVENOUS
Qty: 100 | Refills: 0 | Status: DISCONTINUED | OUTPATIENT
Start: 2020-01-01 | End: 2020-01-01

## 2020-01-01 RX ORDER — WARFARIN SODIUM 2.5 MG/1
2 TABLET ORAL AT BEDTIME
Refills: 0 | Status: COMPLETED | OUTPATIENT
Start: 2020-01-01 | End: 2020-01-01

## 2020-01-01 RX ORDER — LIDOCAINE 4 G/100G
50 CREAM TOPICAL ONCE
Refills: 0 | Status: DISCONTINUED | OUTPATIENT
Start: 2020-01-01 | End: 2020-01-01

## 2020-01-01 RX ORDER — PROPOFOL 10 MG/ML
10 INJECTION, EMULSION INTRAVENOUS
Qty: 1000 | Refills: 0 | Status: DISCONTINUED | OUTPATIENT
Start: 2020-01-01 | End: 2020-01-01

## 2020-01-01 RX ORDER — CEFEPIME 1 G/1
2000 INJECTION, POWDER, FOR SOLUTION INTRAMUSCULAR; INTRAVENOUS
Refills: 0 | Status: DISCONTINUED | OUTPATIENT
Start: 2020-01-01 | End: 2020-01-01

## 2020-01-01 RX ORDER — IPRATROPIUM/ALBUTEROL SULFATE 18-103MCG
3 AEROSOL WITH ADAPTER (GRAM) INHALATION
Qty: 0 | Refills: 0 | DISCHARGE
Start: 2020-01-01

## 2020-01-01 RX ORDER — MORPHINE SULFATE 50 MG/1
4 CAPSULE, EXTENDED RELEASE ORAL
Refills: 0 | Status: DISCONTINUED | OUTPATIENT
Start: 2020-01-01 | End: 2020-01-01

## 2020-01-01 RX ORDER — CASPOFUNGIN ACETATE 7 MG/ML
INJECTION, POWDER, LYOPHILIZED, FOR SOLUTION INTRAVENOUS
Refills: 0 | Status: DISCONTINUED | OUTPATIENT
Start: 2020-01-01 | End: 2020-01-01

## 2020-01-01 RX ORDER — INSULIN LISPRO 100/ML
4 VIAL (ML) SUBCUTANEOUS ONCE
Refills: 0 | Status: COMPLETED | OUTPATIENT
Start: 2020-01-01 | End: 2020-01-01

## 2020-01-01 RX ORDER — AZITHROMYCIN 500 MG/1
500 TABLET, FILM COATED ORAL EVERY 24 HOURS
Refills: 0 | Status: DISCONTINUED | OUTPATIENT
Start: 2020-01-01 | End: 2020-01-01

## 2020-01-01 RX ORDER — LABETALOL HCL 100 MG
10 TABLET ORAL ONCE
Refills: 0 | Status: COMPLETED | OUTPATIENT
Start: 2020-01-01 | End: 2020-01-01

## 2020-01-01 RX ORDER — POTASSIUM CHLORIDE 20 MEQ
40 PACKET (EA) ORAL ONCE
Refills: 0 | Status: COMPLETED | OUTPATIENT
Start: 2020-01-01 | End: 2020-01-01

## 2020-01-01 RX ORDER — OXYCODONE HYDROCHLORIDE 5 MG/1
10 TABLET ORAL ONCE
Refills: 0 | Status: DISCONTINUED | OUTPATIENT
Start: 2020-01-01 | End: 2020-01-01

## 2020-01-01 RX ORDER — SODIUM CHLORIDE 9 MG/ML
500 INJECTION, SOLUTION INTRAVENOUS ONCE
Refills: 0 | Status: DISCONTINUED | OUTPATIENT
Start: 2020-01-01 | End: 2020-01-01

## 2020-01-01 RX ORDER — CISATRACURIUM BESYLATE 2 MG/ML
1 INJECTION INTRAVENOUS
Qty: 200 | Refills: 0 | Status: DISCONTINUED | OUTPATIENT
Start: 2020-01-01 | End: 2020-01-01

## 2020-01-01 RX ORDER — ACETAMINOPHEN 500 MG
1000 TABLET ORAL EVERY 8 HOURS
Refills: 0 | Status: DISCONTINUED | OUTPATIENT
Start: 2020-01-01 | End: 2020-01-01

## 2020-01-01 RX ORDER — IPRATROPIUM/ALBUTEROL SULFATE 18-103MCG
3 AEROSOL WITH ADAPTER (GRAM) INHALATION
Refills: 0 | Status: COMPLETED | OUTPATIENT
Start: 2020-01-01 | End: 2020-01-01

## 2020-01-01 RX ORDER — WARFARIN SODIUM 2.5 MG/1
4 TABLET ORAL AT BEDTIME
Refills: 0 | Status: DISCONTINUED | OUTPATIENT
Start: 2020-01-01 | End: 2020-01-01

## 2020-01-01 RX ORDER — SODIUM CHLORIDE 9 MG/ML
1000 INJECTION INTRAMUSCULAR; INTRAVENOUS; SUBCUTANEOUS ONCE
Refills: 0 | Status: COMPLETED | OUTPATIENT
Start: 2020-01-01 | End: 2020-01-01

## 2020-01-01 RX ORDER — SODIUM CHLORIDE 9 MG/ML
1000 INJECTION INTRAMUSCULAR; INTRAVENOUS; SUBCUTANEOUS
Refills: 0 | Status: DISCONTINUED | OUTPATIENT
Start: 2020-01-01 | End: 2020-01-01

## 2020-01-01 RX ORDER — OXYCODONE HYDROCHLORIDE 5 MG/1
1 TABLET ORAL
Qty: 0 | Refills: 0 | DISCHARGE
Start: 2020-01-01

## 2020-01-01 RX ORDER — GLUCAGON INJECTION, SOLUTION 0.5 MG/.1ML
1 INJECTION, SOLUTION SUBCUTANEOUS ONCE
Refills: 0 | Status: DISCONTINUED | OUTPATIENT
Start: 2020-01-01 | End: 2020-01-01

## 2020-01-01 RX ORDER — LIDOCAINE HCL 20 MG/ML
30 VIAL (ML) INJECTION ONCE
Refills: 0 | Status: DISCONTINUED | OUTPATIENT
Start: 2020-01-01 | End: 2020-01-01

## 2020-01-01 RX ORDER — INSULIN GLARGINE 100 [IU]/ML
40 INJECTION, SOLUTION SUBCUTANEOUS AT BEDTIME
Refills: 0 | Status: DISCONTINUED | OUTPATIENT
Start: 2020-01-01 | End: 2020-01-01

## 2020-01-01 RX ORDER — MEROPENEM 1 G/30ML
1000 INJECTION INTRAVENOUS ONCE
Refills: 0 | Status: COMPLETED | OUTPATIENT
Start: 2020-01-01 | End: 2020-01-01

## 2020-01-01 RX ORDER — MONTELUKAST 4 MG/1
10 TABLET, CHEWABLE ORAL AT BEDTIME
Refills: 0 | Status: DISCONTINUED | OUTPATIENT
Start: 2020-01-01 | End: 2020-01-01

## 2020-01-01 RX ORDER — TACROLIMUS 5 MG/1
0.5 CAPSULE ORAL EVERY 12 HOURS
Refills: 0 | Status: DISCONTINUED | OUTPATIENT
Start: 2020-01-01 | End: 2020-01-01

## 2020-01-01 RX ORDER — IBUPROFEN 200 MG
400 TABLET ORAL EVERY 6 HOURS
Refills: 0 | Status: DISCONTINUED | OUTPATIENT
Start: 2020-01-01 | End: 2020-01-01

## 2020-01-01 RX ORDER — LIDOCAINE 4 G/100G
1 CREAM TOPICAL ONCE
Refills: 0 | Status: DISCONTINUED | OUTPATIENT
Start: 2020-01-01 | End: 2020-01-01

## 2020-01-01 RX ORDER — CHLORHEXIDINE GLUCONATE 213 G/1000ML
15 SOLUTION TOPICAL EVERY 12 HOURS
Refills: 0 | Status: DISCONTINUED | OUTPATIENT
Start: 2020-01-01 | End: 2020-01-01

## 2020-01-01 RX ORDER — GABAPENTIN 400 MG/1
100 CAPSULE ORAL
Refills: 0 | Status: DISCONTINUED | OUTPATIENT
Start: 2020-01-01 | End: 2020-01-01

## 2020-01-01 RX ORDER — ONDANSETRON 8 MG/1
4 TABLET, FILM COATED ORAL
Refills: 0 | Status: DISCONTINUED | OUTPATIENT
Start: 2020-01-01 | End: 2020-01-01

## 2020-01-01 RX ORDER — POTASSIUM CHLORIDE 20 MEQ
40 PACKET (EA) ORAL EVERY 4 HOURS
Refills: 0 | Status: COMPLETED | OUTPATIENT
Start: 2020-01-01 | End: 2020-01-01

## 2020-01-01 RX ORDER — CALCIUM CARBONATE 500(1250)
2 TABLET ORAL EVERY 8 HOURS
Refills: 0 | Status: DISCONTINUED | OUTPATIENT
Start: 2020-01-01 | End: 2020-01-01

## 2020-01-01 RX ORDER — ACETAMINOPHEN 500 MG
2 TABLET ORAL
Qty: 0 | Refills: 0 | DISCHARGE
Start: 2020-01-01

## 2020-01-01 RX ORDER — VANCOMYCIN HCL 1 G
750 VIAL (EA) INTRAVENOUS
Refills: 0 | Status: DISCONTINUED | OUTPATIENT
Start: 2020-01-01 | End: 2020-01-01

## 2020-01-01 RX ORDER — IPRATROPIUM BROMIDE 0.2 MG/ML
1 SOLUTION, NON-ORAL INHALATION EVERY 6 HOURS
Refills: 0 | Status: DISCONTINUED | OUTPATIENT
Start: 2020-01-01 | End: 2020-01-01

## 2020-01-01 RX ORDER — VANCOMYCIN HCL 1 G
750 VIAL (EA) INTRAVENOUS EVERY 12 HOURS
Refills: 0 | Status: DISCONTINUED | OUTPATIENT
Start: 2020-01-01 | End: 2020-01-01

## 2020-01-01 RX ORDER — WARFARIN SODIUM 2.5 MG/1
1 TABLET ORAL
Qty: 0 | Refills: 0 | DISCHARGE

## 2020-01-01 RX ORDER — DEXTROSE 10 % IN WATER 10 %
150 INTRAVENOUS SOLUTION INTRAVENOUS
Refills: 0 | Status: COMPLETED | OUTPATIENT
Start: 2020-01-01 | End: 2020-01-01

## 2020-01-01 RX ORDER — DEXTROSE 10 % IN WATER 10 %
250 INTRAVENOUS SOLUTION INTRAVENOUS
Refills: 0 | Status: COMPLETED | OUTPATIENT
Start: 2020-01-01 | End: 2020-01-01

## 2020-01-01 RX ORDER — SODIUM CHLORIDE 9 MG/ML
250 INJECTION, SOLUTION INTRAVENOUS ONCE
Refills: 0 | Status: DISCONTINUED | OUTPATIENT
Start: 2020-01-01 | End: 2020-01-01

## 2020-01-01 RX ORDER — DESMOPRESSIN ACETATE 0.1 MG/1
20 TABLET ORAL EVERY 12 HOURS
Refills: 0 | Status: DISCONTINUED | OUTPATIENT
Start: 2020-01-01 | End: 2020-01-01

## 2020-01-01 RX ORDER — IPRATROPIUM/ALBUTEROL SULFATE 18-103MCG
3 AEROSOL WITH ADAPTER (GRAM) INHALATION EVERY 6 HOURS
Refills: 0 | Status: DISCONTINUED | OUTPATIENT
Start: 2020-01-01 | End: 2020-01-01

## 2020-01-01 RX ORDER — CEFEPIME 1 G/1
2000 INJECTION, POWDER, FOR SOLUTION INTRAMUSCULAR; INTRAVENOUS ONCE
Refills: 0 | Status: COMPLETED | OUTPATIENT
Start: 2020-01-01 | End: 2020-01-01

## 2020-01-01 RX ORDER — MORPHINE SULFATE 50 MG/1
4 CAPSULE, EXTENDED RELEASE ORAL
Qty: 250 | Refills: 0 | Status: DISCONTINUED | OUTPATIENT
Start: 2020-01-01 | End: 2020-01-01

## 2020-01-01 RX ORDER — IBUPROFEN 200 MG
1 TABLET ORAL
Qty: 0 | Refills: 0 | DISCHARGE
Start: 2020-01-01

## 2020-01-01 RX ORDER — DILTIAZEM HCL 120 MG
60 CAPSULE, EXT RELEASE 24 HR ORAL EVERY 8 HOURS
Refills: 0 | Status: DISCONTINUED | OUTPATIENT
Start: 2020-01-01 | End: 2020-01-01

## 2020-01-01 RX ORDER — PHYTONADIONE (VIT K1) 5 MG
2.5 TABLET ORAL ONCE
Refills: 0 | Status: COMPLETED | OUTPATIENT
Start: 2020-01-01 | End: 2020-01-01

## 2020-01-01 RX ORDER — OXYCODONE AND ACETAMINOPHEN 5; 325 MG/1; MG/1
1 TABLET ORAL ONCE
Refills: 0 | Status: DISCONTINUED | OUTPATIENT
Start: 2020-01-01 | End: 2020-01-01

## 2020-01-01 RX ORDER — OXYCODONE AND ACETAMINOPHEN 5; 325 MG/1; MG/1
1 TABLET ORAL EVERY 4 HOURS
Refills: 0 | Status: DISCONTINUED | OUTPATIENT
Start: 2020-01-01 | End: 2020-01-01

## 2020-01-01 RX ORDER — INSULIN LISPRO 100/ML
10 VIAL (ML) SUBCUTANEOUS ONCE
Refills: 0 | Status: DISCONTINUED | OUTPATIENT
Start: 2020-01-01 | End: 2020-01-01

## 2020-01-01 RX ORDER — CEFTRIAXONE 500 MG/1
1000 INJECTION, POWDER, FOR SOLUTION INTRAMUSCULAR; INTRAVENOUS EVERY 24 HOURS
Refills: 0 | Status: DISCONTINUED | OUTPATIENT
Start: 2020-01-01 | End: 2020-01-01

## 2020-01-01 RX ORDER — FUROSEMIDE 40 MG
160 TABLET ORAL ONCE
Refills: 0 | Status: DISCONTINUED | OUTPATIENT
Start: 2020-01-01 | End: 2020-01-01

## 2020-01-01 RX ORDER — ETHAMBUTOL HYDROCHLORIDE 400 MG/1
1000 TABLET, FILM COATED ORAL DAILY
Refills: 0 | Status: DISCONTINUED | OUTPATIENT
Start: 2020-01-01 | End: 2020-01-01

## 2020-01-01 RX ORDER — CALCIUM CARBONATE 500(1250)
3 TABLET ORAL EVERY 8 HOURS
Refills: 0 | Status: DISCONTINUED | OUTPATIENT
Start: 2020-01-01 | End: 2020-01-01

## 2020-01-01 RX ORDER — FUROSEMIDE 40 MG
40 TABLET ORAL ONCE
Refills: 0 | Status: COMPLETED | OUTPATIENT
Start: 2020-01-01 | End: 2020-01-01

## 2020-01-01 RX ORDER — MIDAZOLAM HYDROCHLORIDE 1 MG/ML
3 INJECTION, SOLUTION INTRAMUSCULAR; INTRAVENOUS ONCE
Refills: 0 | Status: DISCONTINUED | OUTPATIENT
Start: 2020-01-01 | End: 2020-01-01

## 2020-01-01 RX ORDER — METOPROLOL TARTRATE 50 MG
50 TABLET ORAL
Refills: 0 | Status: DISCONTINUED | OUTPATIENT
Start: 2020-01-01 | End: 2020-01-01

## 2020-01-01 RX ORDER — DESMOPRESSIN ACETATE 0.1 MG/1
0.2 TABLET ORAL
Refills: 0 | Status: DISCONTINUED | OUTPATIENT
Start: 2020-01-01 | End: 2020-01-01

## 2020-01-01 RX ORDER — DEXTROSE 50 % IN WATER 50 %
25 SYRINGE (ML) INTRAVENOUS
Refills: 0 | Status: DISCONTINUED | OUTPATIENT
Start: 2020-01-01 | End: 2020-01-01

## 2020-01-01 RX ORDER — AZITHROMYCIN 500 MG/1
TABLET, FILM COATED ORAL
Refills: 0 | Status: DISCONTINUED | OUTPATIENT
Start: 2020-01-01 | End: 2020-01-01

## 2020-01-01 RX ORDER — INSULIN GLARGINE 100 [IU]/ML
50 INJECTION, SOLUTION SUBCUTANEOUS AT BEDTIME
Refills: 0 | Status: DISCONTINUED | OUTPATIENT
Start: 2020-01-01 | End: 2020-01-01

## 2020-01-01 RX ORDER — IPRATROPIUM/ALBUTEROL SULFATE 18-103MCG
3 AEROSOL WITH ADAPTER (GRAM) INHALATION EVERY 4 HOURS
Refills: 0 | Status: DISCONTINUED | OUTPATIENT
Start: 2020-01-01 | End: 2020-01-01

## 2020-01-01 RX ORDER — ALBUTEROL 90 UG/1
1 AEROSOL, METERED ORAL
Qty: 0 | Refills: 0 | DISCHARGE
Start: 2020-01-01

## 2020-01-01 RX ORDER — DESMOPRESSIN ACETATE 0.1 MG/1
24 TABLET ORAL
Refills: 0 | Status: COMPLETED | OUTPATIENT
Start: 2020-01-01 | End: 2020-01-01

## 2020-01-01 RX ORDER — IPRATROPIUM BROMIDE 0.2 MG/ML
4 SOLUTION, NON-ORAL INHALATION EVERY 6 HOURS
Refills: 0 | Status: DISCONTINUED | OUTPATIENT
Start: 2020-01-01 | End: 2020-01-01

## 2020-01-01 RX ORDER — CEFEPIME 1 G/1
2000 INJECTION, POWDER, FOR SOLUTION INTRAMUSCULAR; INTRAVENOUS EVERY 12 HOURS
Refills: 0 | Status: DISCONTINUED | OUTPATIENT
Start: 2020-01-01 | End: 2020-01-01

## 2020-01-01 RX ORDER — LABETALOL HCL 100 MG
100 TABLET ORAL EVERY 12 HOURS
Refills: 0 | Status: DISCONTINUED | OUTPATIENT
Start: 2020-01-01 | End: 2020-01-01

## 2020-01-01 RX ORDER — NOREPINEPHRINE BITARTRATE/D5W 8 MG/250ML
0.05 PLASTIC BAG, INJECTION (ML) INTRAVENOUS
Qty: 16 | Refills: 0 | Status: DISCONTINUED | OUTPATIENT
Start: 2020-01-01 | End: 2020-01-01

## 2020-01-01 RX ORDER — DILTIAZEM HCL 120 MG
30 CAPSULE, EXT RELEASE 24 HR ORAL EVERY 8 HOURS
Refills: 0 | Status: DISCONTINUED | OUTPATIENT
Start: 2020-01-01 | End: 2020-01-01

## 2020-01-01 RX ORDER — SODIUM CHLORIDE 9 MG/ML
500 INJECTION, SOLUTION INTRAVENOUS
Refills: 0 | Status: DISCONTINUED | OUTPATIENT
Start: 2020-01-01 | End: 2020-01-01

## 2020-01-01 RX ORDER — VANCOMYCIN HCL 1 G
500 VIAL (EA) INTRAVENOUS EVERY 12 HOURS
Refills: 0 | Status: DISCONTINUED | OUTPATIENT
Start: 2020-01-01 | End: 2020-01-01

## 2020-01-01 RX ORDER — INSULIN HUMAN 100 [IU]/ML
2 INJECTION, SOLUTION SUBCUTANEOUS ONCE
Refills: 0 | Status: COMPLETED | OUTPATIENT
Start: 2020-01-01 | End: 2020-01-01

## 2020-01-01 RX ORDER — SODIUM BICARBONATE 1 MEQ/ML
650 SYRINGE (ML) INTRAVENOUS THREE TIMES A DAY
Refills: 0 | Status: DISCONTINUED | OUTPATIENT
Start: 2020-01-01 | End: 2020-01-01

## 2020-01-01 RX ORDER — METOPROLOL TARTRATE 50 MG
50 TABLET ORAL DAILY
Refills: 0 | Status: DISCONTINUED | OUTPATIENT
Start: 2020-01-01 | End: 2020-01-01

## 2020-01-01 RX ORDER — DESMOPRESSIN ACETATE 0.1 MG/1
25 TABLET ORAL ONCE
Refills: 0 | Status: DISCONTINUED | OUTPATIENT
Start: 2020-01-01 | End: 2020-01-01

## 2020-01-01 RX ORDER — INSULIN LISPRO 100/ML
5 VIAL (ML) SUBCUTANEOUS ONCE
Refills: 0 | Status: COMPLETED | OUTPATIENT
Start: 2020-01-01 | End: 2020-01-01

## 2020-01-01 RX ORDER — PIPERACILLIN AND TAZOBACTAM 4; .5 G/20ML; G/20ML
3.38 INJECTION, POWDER, LYOPHILIZED, FOR SOLUTION INTRAVENOUS EVERY 6 HOURS
Refills: 0 | Status: DISCONTINUED | OUTPATIENT
Start: 2020-01-01 | End: 2020-01-01

## 2020-01-01 RX ORDER — FLUCONAZOLE 150 MG/1
200 TABLET ORAL EVERY 24 HOURS
Refills: 0 | Status: DISCONTINUED | OUTPATIENT
Start: 2020-01-01 | End: 2020-01-01

## 2020-01-01 RX ORDER — OXYCODONE HYDROCHLORIDE 5 MG/1
5 TABLET ORAL EVERY 6 HOURS
Refills: 0 | Status: DISCONTINUED | OUTPATIENT
Start: 2020-01-01 | End: 2020-01-01

## 2020-01-01 RX ORDER — SODIUM BICARBONATE 1 MEQ/ML
650 SYRINGE (ML) INTRAVENOUS EVERY 8 HOURS
Refills: 0 | Status: DISCONTINUED | OUTPATIENT
Start: 2020-01-01 | End: 2020-01-01

## 2020-01-01 RX ORDER — GABAPENTIN 400 MG/1
300 CAPSULE ORAL THREE TIMES A DAY
Refills: 0 | Status: DISCONTINUED | OUTPATIENT
Start: 2020-01-01 | End: 2020-01-01

## 2020-01-01 RX ORDER — FENTANYL CITRATE 50 UG/ML
15 INJECTION INTRAVENOUS ONCE
Refills: 0 | Status: DISCONTINUED | OUTPATIENT
Start: 2020-01-01 | End: 2020-01-01

## 2020-01-01 RX ORDER — OXYCODONE AND ACETAMINOPHEN 5; 325 MG/1; MG/1
1 TABLET ORAL EVERY 6 HOURS
Refills: 0 | Status: DISCONTINUED | OUTPATIENT
Start: 2020-01-01 | End: 2020-01-01

## 2020-01-01 RX ORDER — WARFARIN SODIUM 2.5 MG/1
2 TABLET ORAL ONCE
Refills: 0 | Status: COMPLETED | OUTPATIENT
Start: 2020-01-01 | End: 2020-01-01

## 2020-01-01 RX ORDER — WARFARIN SODIUM 2.5 MG/1
4 TABLET ORAL AT BEDTIME
Refills: 0 | Status: COMPLETED | OUTPATIENT
Start: 2020-01-01 | End: 2020-01-01

## 2020-01-01 RX ORDER — AZITHROMYCIN 500 MG/1
500 TABLET, FILM COATED ORAL DAILY
Refills: 0 | Status: DISCONTINUED | OUTPATIENT
Start: 2020-01-01 | End: 2020-01-01

## 2020-01-01 RX ORDER — INSULIN LISPRO 100/ML
6 VIAL (ML) SUBCUTANEOUS ONCE
Refills: 0 | Status: COMPLETED | OUTPATIENT
Start: 2020-01-01 | End: 2020-01-01

## 2020-01-01 RX ORDER — MEROPENEM 1 G/30ML
1000 INJECTION INTRAVENOUS EVERY 8 HOURS
Refills: 0 | Status: COMPLETED | OUTPATIENT
Start: 2020-01-01 | End: 2020-01-01

## 2020-01-01 RX ADMIN — PANTOPRAZOLE SODIUM 40 MILLIGRAM(S): 20 TABLET, DELAYED RELEASE ORAL at 17:14

## 2020-01-01 RX ADMIN — Medication 30 MILLIGRAM(S): at 05:04

## 2020-01-01 RX ADMIN — MUPIROCIN 1 APPLICATION(S): 20 OINTMENT TOPICAL at 18:36

## 2020-01-01 RX ADMIN — Medication 500 MEQ/KG/HR: at 12:41

## 2020-01-01 RX ADMIN — LACTULOSE 10 GRAM(S): 10 SOLUTION ORAL at 18:12

## 2020-01-01 RX ADMIN — CHLORHEXIDINE GLUCONATE 1 APPLICATION(S): 213 SOLUTION TOPICAL at 05:07

## 2020-01-01 RX ADMIN — INSULIN HUMAN 10 UNIT(S): 100 INJECTION, SOLUTION SUBCUTANEOUS at 04:45

## 2020-01-01 RX ADMIN — Medication 1 PUFF(S): at 08:47

## 2020-01-01 RX ADMIN — Medication 1 PUFF(S): at 15:48

## 2020-01-01 RX ADMIN — Medication 4 PUFF(S): at 14:05

## 2020-01-01 RX ADMIN — GABAPENTIN 300 MILLIGRAM(S): 400 CAPSULE ORAL at 21:58

## 2020-01-01 RX ADMIN — Medication 1 PUFF(S): at 08:30

## 2020-01-01 RX ADMIN — FENTANYL CITRATE 50 MICROGRAM(S): 50 INJECTION INTRAVENOUS at 06:36

## 2020-01-01 RX ADMIN — AMLODIPINE BESYLATE 10 MILLIGRAM(S): 2.5 TABLET ORAL at 21:07

## 2020-01-01 RX ADMIN — ONDANSETRON 4 MILLIGRAM(S): 8 TABLET, FILM COATED ORAL at 19:15

## 2020-01-01 RX ADMIN — Medication 3 MILLILITER(S): at 04:51

## 2020-01-01 RX ADMIN — CALCITRIOL 0.25 MICROGRAM(S): 0.5 CAPSULE ORAL at 15:53

## 2020-01-01 RX ADMIN — DEXMEDETOMIDINE HYDROCHLORIDE IN 0.9% SODIUM CHLORIDE 3.77 MICROGRAM(S)/KG/HR: 4 INJECTION INTRAVENOUS at 21:07

## 2020-01-01 RX ADMIN — Medication 2 TABLET(S): at 15:32

## 2020-01-01 RX ADMIN — PANTOPRAZOLE SODIUM 40 MILLIGRAM(S): 20 TABLET, DELAYED RELEASE ORAL at 06:21

## 2020-01-01 RX ADMIN — Medication 50 MILLIGRAM(S): at 05:27

## 2020-01-01 RX ADMIN — PANTOPRAZOLE SODIUM 40 MILLIGRAM(S): 20 TABLET, DELAYED RELEASE ORAL at 18:39

## 2020-01-01 RX ADMIN — FENTANYL CITRATE 50 MICROGRAM(S): 50 INJECTION INTRAVENOUS at 15:15

## 2020-01-01 RX ADMIN — CASPOFUNGIN ACETATE 260 MILLIGRAM(S): 7 INJECTION, POWDER, LYOPHILIZED, FOR SOLUTION INTRAVENOUS at 15:01

## 2020-01-01 RX ADMIN — CHLORHEXIDINE GLUCONATE 15 MILLILITER(S): 213 SOLUTION TOPICAL at 05:32

## 2020-01-01 RX ADMIN — MEROPENEM 100 MILLIGRAM(S): 1 INJECTION INTRAVENOUS at 14:42

## 2020-01-01 RX ADMIN — Medication 3 TABLET(S): at 05:52

## 2020-01-01 RX ADMIN — Medication 3 TABLET(S): at 23:03

## 2020-01-01 RX ADMIN — Medication 80 MILLIGRAM(S): at 23:10

## 2020-01-01 RX ADMIN — Medication 1 TABLET(S): at 12:11

## 2020-01-01 RX ADMIN — Medication 3 TABLET(S): at 05:57

## 2020-01-01 RX ADMIN — CHLORHEXIDINE GLUCONATE 1 APPLICATION(S): 213 SOLUTION TOPICAL at 05:11

## 2020-01-01 RX ADMIN — MIDAZOLAM HYDROCHLORIDE 2 MILLIGRAM(S): 1 INJECTION, SOLUTION INTRAMUSCULAR; INTRAVENOUS at 12:19

## 2020-01-01 RX ADMIN — Medication 4 PUFF(S): at 13:37

## 2020-01-01 RX ADMIN — MUPIROCIN 1 APPLICATION(S): 20 OINTMENT TOPICAL at 18:29

## 2020-01-01 RX ADMIN — FENTANYL CITRATE 3.75 MICROGRAM(S)/KG/HR: 50 INJECTION INTRAVENOUS at 05:50

## 2020-01-01 RX ADMIN — OXYCODONE HYDROCHLORIDE 5 MILLIGRAM(S): 5 TABLET ORAL at 07:56

## 2020-01-01 RX ADMIN — FENTANYL CITRATE 3.75 MICROGRAM(S)/KG/HR: 50 INJECTION INTRAVENOUS at 06:57

## 2020-01-01 RX ADMIN — Medication 2: at 12:40

## 2020-01-01 RX ADMIN — Medication 1 TABLET(S): at 12:26

## 2020-01-01 RX ADMIN — Medication 40 MILLIGRAM(S): at 17:37

## 2020-01-01 RX ADMIN — Medication 325 MILLIGRAM(S): at 15:19

## 2020-01-01 RX ADMIN — Medication 2: at 12:30

## 2020-01-01 RX ADMIN — PANTOPRAZOLE SODIUM 40 MILLIGRAM(S): 20 TABLET, DELAYED RELEASE ORAL at 12:00

## 2020-01-01 RX ADMIN — Medication 1 TABLET(S): at 12:29

## 2020-01-01 RX ADMIN — TACROLIMUS 0.5 MILLIGRAM(S): 5 CAPSULE ORAL at 06:15

## 2020-01-01 RX ADMIN — CHLORHEXIDINE GLUCONATE 15 MILLILITER(S): 213 SOLUTION TOPICAL at 05:02

## 2020-01-01 RX ADMIN — GABAPENTIN 300 MILLIGRAM(S): 400 CAPSULE ORAL at 22:04

## 2020-01-01 RX ADMIN — INSULIN HUMAN 4 UNIT(S)/HR: 100 INJECTION, SOLUTION SUBCUTANEOUS at 20:17

## 2020-01-01 RX ADMIN — Medication 250 MILLIGRAM(S): at 11:50

## 2020-01-01 RX ADMIN — Medication 30 MILLIGRAM(S): at 14:30

## 2020-01-01 RX ADMIN — Medication 2 TABLET(S): at 21:04

## 2020-01-01 RX ADMIN — Medication 4: at 13:58

## 2020-01-01 RX ADMIN — CHLORHEXIDINE GLUCONATE 1 APPLICATION(S): 213 SOLUTION TOPICAL at 05:35

## 2020-01-01 RX ADMIN — CEFEPIME 2000 MILLIGRAM(S): 1 INJECTION, POWDER, FOR SOLUTION INTRAMUSCULAR; INTRAVENOUS at 19:44

## 2020-01-01 RX ADMIN — PREGABALIN 1000 MICROGRAM(S): 225 CAPSULE ORAL at 13:25

## 2020-01-01 RX ADMIN — Medication 60 MILLIGRAM(S): at 13:52

## 2020-01-01 RX ADMIN — Medication 1 TABLET(S): at 12:14

## 2020-01-01 RX ADMIN — Medication 1 PUFF(S): at 22:07

## 2020-01-01 RX ADMIN — Medication 3 TABLET(S): at 06:14

## 2020-01-01 RX ADMIN — CHLORHEXIDINE GLUCONATE 15 MILLILITER(S): 213 SOLUTION TOPICAL at 17:38

## 2020-01-01 RX ADMIN — CHLORHEXIDINE GLUCONATE 1 APPLICATION(S): 213 SOLUTION TOPICAL at 06:31

## 2020-01-01 RX ADMIN — Medication 12.5 GRAM(S): at 11:04

## 2020-01-01 RX ADMIN — Medication 3 MILLILITER(S): at 08:28

## 2020-01-01 RX ADMIN — Medication 30 MILLIGRAM(S): at 05:40

## 2020-01-01 RX ADMIN — Medication 1000 MILLIGRAM(S): at 22:21

## 2020-01-01 RX ADMIN — SODIUM CHLORIDE 2000 MILLILITER(S): 9 INJECTION, SOLUTION INTRAVENOUS at 21:00

## 2020-01-01 RX ADMIN — Medication 60 MILLIGRAM(S): at 05:15

## 2020-01-01 RX ADMIN — Medication 325 MILLIGRAM(S): at 11:30

## 2020-01-01 RX ADMIN — Medication 3 MILLILITER(S): at 20:19

## 2020-01-01 RX ADMIN — CASPOFUNGIN ACETATE 260 MILLIGRAM(S): 7 INJECTION, POWDER, LYOPHILIZED, FOR SOLUTION INTRAVENOUS at 14:03

## 2020-01-01 RX ADMIN — Medication 80 MILLIGRAM(S): at 18:17

## 2020-01-01 RX ADMIN — LINEZOLID 300 MILLIGRAM(S): 600 INJECTION, SOLUTION INTRAVENOUS at 17:51

## 2020-01-01 RX ADMIN — LINEZOLID 300 MILLIGRAM(S): 600 INJECTION, SOLUTION INTRAVENOUS at 06:30

## 2020-01-01 RX ADMIN — SODIUM CHLORIDE 100 MILLILITER(S): 9 INJECTION, SOLUTION INTRAVENOUS at 08:30

## 2020-01-01 RX ADMIN — Medication 2 TABLET(S): at 06:20

## 2020-01-01 RX ADMIN — ALBUTEROL 4 PUFF(S): 90 AEROSOL, METERED ORAL at 08:33

## 2020-01-01 RX ADMIN — MUPIROCIN 1 APPLICATION(S): 20 OINTMENT TOPICAL at 05:51

## 2020-01-01 RX ADMIN — SENNA PLUS 2 TABLET(S): 8.6 TABLET ORAL at 21:48

## 2020-01-01 RX ADMIN — CALCITRIOL 0.25 MICROGRAM(S): 0.5 CAPSULE ORAL at 12:41

## 2020-01-01 RX ADMIN — Medication 2 TABLET(S): at 06:30

## 2020-01-01 RX ADMIN — TACROLIMUS 0.5 MILLIGRAM(S): 5 CAPSULE ORAL at 05:14

## 2020-01-01 RX ADMIN — CHLORHEXIDINE GLUCONATE 1 APPLICATION(S): 213 SOLUTION TOPICAL at 05:04

## 2020-01-01 RX ADMIN — WARFARIN SODIUM 2 MILLIGRAM(S): 2.5 TABLET ORAL at 21:44

## 2020-01-01 RX ADMIN — MIDAZOLAM HYDROCHLORIDE 2 MILLIGRAM(S): 1 INJECTION, SOLUTION INTRAMUSCULAR; INTRAVENOUS at 03:00

## 2020-01-01 RX ADMIN — Medication 250 MILLIGRAM(S): at 05:17

## 2020-01-01 RX ADMIN — Medication 60 MILLIGRAM(S): at 17:28

## 2020-01-01 RX ADMIN — Medication 50 MILLIGRAM(S): at 05:09

## 2020-01-01 RX ADMIN — CHLORHEXIDINE GLUCONATE 15 MILLILITER(S): 213 SOLUTION TOPICAL at 05:07

## 2020-01-01 RX ADMIN — MUPIROCIN 1 APPLICATION(S): 20 OINTMENT TOPICAL at 05:57

## 2020-01-01 RX ADMIN — Medication 3 TABLET(S): at 06:16

## 2020-01-01 RX ADMIN — DEXMEDETOMIDINE HYDROCHLORIDE IN 0.9% SODIUM CHLORIDE 3.77 MICROGRAM(S)/KG/HR: 4 INJECTION INTRAVENOUS at 14:14

## 2020-01-01 RX ADMIN — DEXMEDETOMIDINE HYDROCHLORIDE IN 0.9% SODIUM CHLORIDE 9.03 MICROGRAM(S)/KG/HR: 4 INJECTION INTRAVENOUS at 07:00

## 2020-01-01 RX ADMIN — Medication 650 MILLIGRAM(S): at 06:21

## 2020-01-01 RX ADMIN — GABAPENTIN 300 MILLIGRAM(S): 400 CAPSULE ORAL at 22:35

## 2020-01-01 RX ADMIN — Medication 50 MILLIGRAM(S): at 05:30

## 2020-01-01 RX ADMIN — Medication 40 MILLIGRAM(S): at 06:18

## 2020-01-01 RX ADMIN — PANTOPRAZOLE SODIUM 40 MILLIGRAM(S): 20 TABLET, DELAYED RELEASE ORAL at 12:20

## 2020-01-01 RX ADMIN — MONTELUKAST 10 MILLIGRAM(S): 4 TABLET, CHEWABLE ORAL at 21:04

## 2020-01-01 RX ADMIN — CHLORHEXIDINE GLUCONATE 1 APPLICATION(S): 213 SOLUTION TOPICAL at 05:50

## 2020-01-01 RX ADMIN — PREGABALIN 1000 MICROGRAM(S): 225 CAPSULE ORAL at 12:47

## 2020-01-01 RX ADMIN — CHLORHEXIDINE GLUCONATE 15 MILLILITER(S): 213 SOLUTION TOPICAL at 06:06

## 2020-01-01 RX ADMIN — MUPIROCIN 1 APPLICATION(S): 20 OINTMENT TOPICAL at 17:23

## 2020-01-01 RX ADMIN — FENTANYL CITRATE 100 MICROGRAM(S): 50 INJECTION INTRAVENOUS at 10:43

## 2020-01-01 RX ADMIN — FLUCONAZOLE 400 MILLIGRAM(S): 150 TABLET ORAL at 11:52

## 2020-01-01 RX ADMIN — Medication 30 MILLIGRAM(S): at 08:47

## 2020-01-01 RX ADMIN — Medication 1000 MILLIGRAM(S): at 13:27

## 2020-01-01 RX ADMIN — Medication 50 MILLIGRAM(S): at 18:03

## 2020-01-01 RX ADMIN — Medication 3 TABLET(S): at 13:19

## 2020-01-01 RX ADMIN — CASPOFUNGIN ACETATE 260 MILLIGRAM(S): 7 INJECTION, POWDER, LYOPHILIZED, FOR SOLUTION INTRAVENOUS at 14:27

## 2020-01-01 RX ADMIN — PANTOPRAZOLE SODIUM 40 MILLIGRAM(S): 20 TABLET, DELAYED RELEASE ORAL at 06:22

## 2020-01-01 RX ADMIN — Medication 1000 MILLIGRAM(S): at 05:47

## 2020-01-01 RX ADMIN — Medication 10 MILLIGRAM(S): at 05:30

## 2020-01-01 RX ADMIN — Medication 1000 MILLIGRAM(S): at 05:21

## 2020-01-01 RX ADMIN — MIDAZOLAM HYDROCHLORIDE 1.51 MG/KG/HR: 1 INJECTION, SOLUTION INTRAMUSCULAR; INTRAVENOUS at 08:54

## 2020-01-01 RX ADMIN — MUPIROCIN 1 APPLICATION(S): 20 OINTMENT TOPICAL at 05:58

## 2020-01-01 RX ADMIN — DESMOPRESSIN ACETATE 224 MICROGRAM(S): 0.1 TABLET ORAL at 11:14

## 2020-01-01 RX ADMIN — MUPIROCIN 1 APPLICATION(S): 20 OINTMENT TOPICAL at 05:18

## 2020-01-01 RX ADMIN — PREGABALIN 1000 MICROGRAM(S): 225 CAPSULE ORAL at 12:27

## 2020-01-01 RX ADMIN — MONTELUKAST 10 MILLIGRAM(S): 4 TABLET, CHEWABLE ORAL at 21:09

## 2020-01-01 RX ADMIN — GABAPENTIN 300 MILLIGRAM(S): 400 CAPSULE ORAL at 23:31

## 2020-01-01 RX ADMIN — MEROPENEM 100 MILLIGRAM(S): 1 INJECTION INTRAVENOUS at 22:35

## 2020-01-01 RX ADMIN — MEROPENEM 100 MILLIGRAM(S): 1 INJECTION INTRAVENOUS at 12:29

## 2020-01-01 RX ADMIN — Medication 50 MILLIGRAM(S): at 06:15

## 2020-01-01 RX ADMIN — OXYCODONE HYDROCHLORIDE 5 MILLIGRAM(S): 5 TABLET ORAL at 01:01

## 2020-01-01 RX ADMIN — OXYCODONE AND ACETAMINOPHEN 1 TABLET(S): 5; 325 TABLET ORAL at 11:32

## 2020-01-01 RX ADMIN — Medication 250 MILLIGRAM(S): at 05:05

## 2020-01-01 RX ADMIN — DESMOPRESSIN ACETATE 224 MICROGRAM(S): 0.1 TABLET ORAL at 06:38

## 2020-01-01 RX ADMIN — MONTELUKAST 10 MILLIGRAM(S): 4 TABLET, CHEWABLE ORAL at 22:27

## 2020-01-01 RX ADMIN — Medication 250 MILLIGRAM(S): at 05:07

## 2020-01-01 RX ADMIN — ALBUTEROL 4 PUFF(S): 90 AEROSOL, METERED ORAL at 20:57

## 2020-01-01 RX ADMIN — MIDAZOLAM HYDROCHLORIDE 0.81 MG/KG/HR: 1 INJECTION, SOLUTION INTRAMUSCULAR; INTRAVENOUS at 02:14

## 2020-01-01 RX ADMIN — MUPIROCIN 1 APPLICATION(S): 20 OINTMENT TOPICAL at 17:39

## 2020-01-01 RX ADMIN — OXYCODONE HYDROCHLORIDE 10 MILLIGRAM(S): 5 TABLET ORAL at 05:55

## 2020-01-01 RX ADMIN — Medication 60 MILLIGRAM(S): at 17:40

## 2020-01-01 RX ADMIN — ALBUTEROL 4 PUFF(S): 90 AEROSOL, METERED ORAL at 01:44

## 2020-01-01 RX ADMIN — OXYCODONE HYDROCHLORIDE 5 MILLIGRAM(S): 5 TABLET ORAL at 19:35

## 2020-01-01 RX ADMIN — Medication 80 MILLIGRAM(S): at 05:06

## 2020-01-01 RX ADMIN — Medication 1 PUFF(S): at 19:52

## 2020-01-01 RX ADMIN — MONTELUKAST 10 MILLIGRAM(S): 4 TABLET, CHEWABLE ORAL at 21:22

## 2020-01-01 RX ADMIN — CALCITRIOL 0.25 MICROGRAM(S): 0.5 CAPSULE ORAL at 12:11

## 2020-01-01 RX ADMIN — Medication 10 MILLIGRAM(S): at 02:36

## 2020-01-01 RX ADMIN — GABAPENTIN 300 MILLIGRAM(S): 400 CAPSULE ORAL at 21:07

## 2020-01-01 RX ADMIN — MEROPENEM 100 MILLIGRAM(S): 1 INJECTION INTRAVENOUS at 13:34

## 2020-01-01 RX ADMIN — TACROLIMUS 0.5 MILLIGRAM(S): 5 CAPSULE ORAL at 05:21

## 2020-01-01 RX ADMIN — Medication 325 MILLIGRAM(S): at 14:09

## 2020-01-01 RX ADMIN — Medication 60 MILLIGRAM(S): at 18:40

## 2020-01-01 RX ADMIN — CHLORHEXIDINE GLUCONATE 1 APPLICATION(S): 213 SOLUTION TOPICAL at 05:17

## 2020-01-01 RX ADMIN — MUPIROCIN 1 APPLICATION(S): 20 OINTMENT TOPICAL at 06:26

## 2020-01-01 RX ADMIN — MUPIROCIN 1 APPLICATION(S): 20 OINTMENT TOPICAL at 05:24

## 2020-01-01 RX ADMIN — MIDAZOLAM HYDROCHLORIDE 2 MILLIGRAM(S): 1 INJECTION, SOLUTION INTRAMUSCULAR; INTRAVENOUS at 12:00

## 2020-01-01 RX ADMIN — PREGABALIN 1000 MICROGRAM(S): 225 CAPSULE ORAL at 11:57

## 2020-01-01 RX ADMIN — CALCITRIOL 0.25 MICROGRAM(S): 0.5 CAPSULE ORAL at 17:16

## 2020-01-01 RX ADMIN — ALBUTEROL 4 PUFF(S): 90 AEROSOL, METERED ORAL at 19:51

## 2020-01-01 RX ADMIN — OXYCODONE HYDROCHLORIDE 10 MILLIGRAM(S): 5 TABLET ORAL at 11:32

## 2020-01-01 RX ADMIN — MUPIROCIN 1 APPLICATION(S): 20 OINTMENT TOPICAL at 17:10

## 2020-01-01 RX ADMIN — CHLORHEXIDINE GLUCONATE 15 MILLILITER(S): 213 SOLUTION TOPICAL at 17:39

## 2020-01-01 RX ADMIN — Medication 3 MILLILITER(S): at 14:44

## 2020-01-01 RX ADMIN — Medication 3 TABLET(S): at 22:17

## 2020-01-01 RX ADMIN — SODIUM CHLORIDE 2000 MILLILITER(S): 9 INJECTION, SOLUTION INTRAVENOUS at 12:46

## 2020-01-01 RX ADMIN — MEROPENEM 100 MILLIGRAM(S): 1 INJECTION INTRAVENOUS at 05:35

## 2020-01-01 RX ADMIN — Medication 1000 MILLIGRAM(S): at 21:05

## 2020-01-01 RX ADMIN — Medication 2 TABLET(S): at 21:22

## 2020-01-01 RX ADMIN — Medication 650 MILLIGRAM(S): at 05:04

## 2020-01-01 RX ADMIN — MUPIROCIN 1 APPLICATION(S): 20 OINTMENT TOPICAL at 05:02

## 2020-01-01 RX ADMIN — Medication 1 PUFF(S): at 08:06

## 2020-01-01 RX ADMIN — Medication 1 PUFF(S): at 08:23

## 2020-01-01 RX ADMIN — MUPIROCIN 1 APPLICATION(S): 20 OINTMENT TOPICAL at 18:23

## 2020-01-01 RX ADMIN — Medication 20 MILLIGRAM(S): at 05:30

## 2020-01-01 RX ADMIN — Medication 20 MILLIGRAM(S): at 05:09

## 2020-01-01 RX ADMIN — CASPOFUNGIN ACETATE 260 MILLIGRAM(S): 7 INJECTION, POWDER, LYOPHILIZED, FOR SOLUTION INTRAVENOUS at 22:44

## 2020-01-01 RX ADMIN — MUPIROCIN 1 APPLICATION(S): 20 OINTMENT TOPICAL at 17:56

## 2020-01-01 RX ADMIN — CHLORHEXIDINE GLUCONATE 15 MILLILITER(S): 213 SOLUTION TOPICAL at 17:23

## 2020-01-01 RX ADMIN — GABAPENTIN 300 MILLIGRAM(S): 400 CAPSULE ORAL at 23:12

## 2020-01-01 RX ADMIN — Medication 100 MILLIGRAM(S): at 05:03

## 2020-01-01 RX ADMIN — Medication 3 TABLET(S): at 12:33

## 2020-01-01 RX ADMIN — CHLORHEXIDINE GLUCONATE 1 APPLICATION(S): 213 SOLUTION TOPICAL at 05:23

## 2020-01-01 RX ADMIN — CHLORHEXIDINE GLUCONATE 1 APPLICATION(S): 213 SOLUTION TOPICAL at 05:31

## 2020-01-01 RX ADMIN — Medication 650 MILLIGRAM(S): at 23:10

## 2020-01-01 RX ADMIN — MEROPENEM 100 MILLIGRAM(S): 1 INJECTION INTRAVENOUS at 15:13

## 2020-01-01 RX ADMIN — Medication 10 UNIT(S): at 00:47

## 2020-01-01 RX ADMIN — Medication 3 TABLET(S): at 14:02

## 2020-01-01 RX ADMIN — CHLORHEXIDINE GLUCONATE 15 MILLILITER(S): 213 SOLUTION TOPICAL at 06:20

## 2020-01-01 RX ADMIN — Medication 1 PUFF(S): at 19:10

## 2020-01-01 RX ADMIN — MUPIROCIN 1 APPLICATION(S): 20 OINTMENT TOPICAL at 18:37

## 2020-01-01 RX ADMIN — Medication 60 MILLIGRAM(S): at 05:51

## 2020-01-01 RX ADMIN — MUPIROCIN 1 APPLICATION(S): 20 OINTMENT TOPICAL at 05:35

## 2020-01-01 RX ADMIN — Medication 4 PUFF(S): at 13:21

## 2020-01-01 RX ADMIN — Medication 100 MILLIGRAM(S): at 06:35

## 2020-01-01 RX ADMIN — CHLORHEXIDINE GLUCONATE 1 APPLICATION(S): 213 SOLUTION TOPICAL at 05:41

## 2020-01-01 RX ADMIN — FENTANYL CITRATE 3.75 MICROGRAM(S)/KG/HR: 50 INJECTION INTRAVENOUS at 14:40

## 2020-01-01 RX ADMIN — CHLORHEXIDINE GLUCONATE 1 APPLICATION(S): 213 SOLUTION TOPICAL at 06:15

## 2020-01-01 RX ADMIN — Medication 3 MILLILITER(S): at 08:50

## 2020-01-01 RX ADMIN — DEXMEDETOMIDINE HYDROCHLORIDE IN 0.9% SODIUM CHLORIDE 3.61 MICROGRAM(S)/KG/HR: 4 INJECTION INTRAVENOUS at 03:56

## 2020-01-01 RX ADMIN — Medication 2 TABLET(S): at 05:07

## 2020-01-01 RX ADMIN — CHLORHEXIDINE GLUCONATE 1 APPLICATION(S): 213 SOLUTION TOPICAL at 06:37

## 2020-01-01 RX ADMIN — Medication 1 TABLET(S): at 11:19

## 2020-01-01 RX ADMIN — Medication 1 TABLET(S): at 11:38

## 2020-01-01 RX ADMIN — Medication 1 PUFF(S): at 02:21

## 2020-01-01 RX ADMIN — MEROPENEM 100 MILLIGRAM(S): 1 INJECTION INTRAVENOUS at 21:02

## 2020-01-01 RX ADMIN — MUPIROCIN 1 APPLICATION(S): 20 OINTMENT TOPICAL at 05:50

## 2020-01-01 RX ADMIN — Medication 3 TABLET(S): at 14:03

## 2020-01-01 RX ADMIN — Medication 80 MILLIGRAM(S): at 05:51

## 2020-01-01 RX ADMIN — LINEZOLID 300 MILLIGRAM(S): 600 INJECTION, SOLUTION INTRAVENOUS at 18:17

## 2020-01-01 RX ADMIN — Medication 125 MILLIGRAM(S): at 14:27

## 2020-01-01 RX ADMIN — CHLORHEXIDINE GLUCONATE 15 MILLILITER(S): 213 SOLUTION TOPICAL at 05:42

## 2020-01-01 RX ADMIN — Medication 125 MILLIGRAM(S): at 15:02

## 2020-01-01 RX ADMIN — Medication 3.53 MICROGRAM(S)/KG/MIN: at 22:35

## 2020-01-01 RX ADMIN — Medication 1000 MILLIGRAM(S): at 21:58

## 2020-01-01 RX ADMIN — FENTANYL CITRATE 3.77 MICROGRAM(S)/KG/HR: 50 INJECTION INTRAVENOUS at 12:18

## 2020-01-01 RX ADMIN — CHLORHEXIDINE GLUCONATE 1 APPLICATION(S): 213 SOLUTION TOPICAL at 05:27

## 2020-01-01 RX ADMIN — Medication 2 MILLIGRAM(S): at 14:04

## 2020-01-01 RX ADMIN — OXYCODONE HYDROCHLORIDE 5 MILLIGRAM(S): 5 TABLET ORAL at 00:31

## 2020-01-01 RX ADMIN — Medication 3 MILLILITER(S): at 09:16

## 2020-01-01 RX ADMIN — TACROLIMUS 0.5 MILLIGRAM(S): 5 CAPSULE ORAL at 05:59

## 2020-01-01 RX ADMIN — Medication 4: at 12:40

## 2020-01-01 RX ADMIN — CEFEPIME 100 MILLIGRAM(S): 1 INJECTION, POWDER, FOR SOLUTION INTRAMUSCULAR; INTRAVENOUS at 18:46

## 2020-01-01 RX ADMIN — MONTELUKAST 10 MILLIGRAM(S): 4 TABLET, CHEWABLE ORAL at 21:07

## 2020-01-01 RX ADMIN — PREGABALIN 1000 MICROGRAM(S): 225 CAPSULE ORAL at 12:17

## 2020-01-01 RX ADMIN — Medication 50 MILLIEQUIVALENT(S): at 04:50

## 2020-01-01 RX ADMIN — ALBUTEROL 4 PUFF(S): 90 AEROSOL, METERED ORAL at 08:44

## 2020-01-01 RX ADMIN — Medication 100 MILLIGRAM(S): at 17:23

## 2020-01-01 RX ADMIN — CALCITRIOL 0.25 MICROGRAM(S): 0.5 CAPSULE ORAL at 12:27

## 2020-01-01 RX ADMIN — Medication 80 MILLIGRAM(S): at 23:06

## 2020-01-01 RX ADMIN — INSULIN HUMAN 4 UNIT(S)/HR: 100 INJECTION, SOLUTION SUBCUTANEOUS at 20:00

## 2020-01-01 RX ADMIN — GABAPENTIN 100 MILLIGRAM(S): 400 CAPSULE ORAL at 15:19

## 2020-01-01 RX ADMIN — MONTELUKAST 10 MILLIGRAM(S): 4 TABLET, CHEWABLE ORAL at 23:10

## 2020-01-01 RX ADMIN — FLUCONAZOLE 400 MILLIGRAM(S): 150 TABLET ORAL at 18:23

## 2020-01-01 RX ADMIN — SENNA PLUS 2 TABLET(S): 8.6 TABLET ORAL at 21:07

## 2020-01-01 RX ADMIN — GABAPENTIN 300 MILLIGRAM(S): 400 CAPSULE ORAL at 23:01

## 2020-01-01 RX ADMIN — PANTOPRAZOLE SODIUM 40 MILLIGRAM(S): 20 TABLET, DELAYED RELEASE ORAL at 05:50

## 2020-01-01 RX ADMIN — MUPIROCIN 1 APPLICATION(S): 20 OINTMENT TOPICAL at 18:04

## 2020-01-01 RX ADMIN — CHLORHEXIDINE GLUCONATE 15 MILLILITER(S): 213 SOLUTION TOPICAL at 05:53

## 2020-01-01 RX ADMIN — MUPIROCIN 1 APPLICATION(S): 20 OINTMENT TOPICAL at 05:08

## 2020-01-01 RX ADMIN — TACROLIMUS 0.5 MILLIGRAM(S): 5 CAPSULE ORAL at 05:07

## 2020-01-01 RX ADMIN — ONDANSETRON 4 MILLIGRAM(S): 8 TABLET, FILM COATED ORAL at 10:48

## 2020-01-01 RX ADMIN — PANTOPRAZOLE SODIUM 40 MILLIGRAM(S): 20 TABLET, DELAYED RELEASE ORAL at 12:16

## 2020-01-01 RX ADMIN — OXYCODONE HYDROCHLORIDE 10 MILLIGRAM(S): 5 TABLET ORAL at 23:07

## 2020-01-01 RX ADMIN — MUPIROCIN 1 APPLICATION(S): 20 OINTMENT TOPICAL at 05:14

## 2020-01-01 RX ADMIN — FENTANYL CITRATE 3.61 MICROGRAM(S)/KG/HR: 50 INJECTION INTRAVENOUS at 20:00

## 2020-01-01 RX ADMIN — Medication 3 TABLET(S): at 21:00

## 2020-01-01 RX ADMIN — TACROLIMUS 0.5 MILLIGRAM(S): 5 CAPSULE ORAL at 17:05

## 2020-01-01 RX ADMIN — Medication 1 PUFF(S): at 14:41

## 2020-01-01 RX ADMIN — CHLORHEXIDINE GLUCONATE 1 APPLICATION(S): 213 SOLUTION TOPICAL at 06:11

## 2020-01-01 RX ADMIN — Medication 650 MILLIGRAM(S): at 12:17

## 2020-01-01 RX ADMIN — CHLORHEXIDINE GLUCONATE 1 APPLICATION(S): 213 SOLUTION TOPICAL at 05:58

## 2020-01-01 RX ADMIN — Medication 4 PUFF(S): at 14:04

## 2020-01-01 RX ADMIN — Medication 650 MILLIGRAM(S): at 15:01

## 2020-01-01 RX ADMIN — Medication 60 MILLIGRAM(S): at 18:45

## 2020-01-01 RX ADMIN — TACROLIMUS 0.5 MILLIGRAM(S): 5 CAPSULE ORAL at 06:30

## 2020-01-01 RX ADMIN — MEROPENEM 100 MILLIGRAM(S): 1 INJECTION INTRAVENOUS at 21:22

## 2020-01-01 RX ADMIN — Medication 50 MILLIGRAM(S): at 05:11

## 2020-01-01 RX ADMIN — PREGABALIN 1000 MICROGRAM(S): 225 CAPSULE ORAL at 12:28

## 2020-01-01 RX ADMIN — Medication 30 MILLIGRAM(S): at 05:08

## 2020-01-01 RX ADMIN — Medication 650 MILLIGRAM(S): at 13:56

## 2020-01-01 RX ADMIN — GABAPENTIN 300 MILLIGRAM(S): 400 CAPSULE ORAL at 21:30

## 2020-01-01 RX ADMIN — MUPIROCIN 1 APPLICATION(S): 20 OINTMENT TOPICAL at 06:06

## 2020-01-01 RX ADMIN — CEFEPIME 100 MILLIGRAM(S): 1 INJECTION, POWDER, FOR SOLUTION INTRAMUSCULAR; INTRAVENOUS at 17:28

## 2020-01-01 RX ADMIN — CHLORHEXIDINE GLUCONATE 15 MILLILITER(S): 213 SOLUTION TOPICAL at 05:51

## 2020-01-01 RX ADMIN — PANTOPRAZOLE SODIUM 40 MILLIGRAM(S): 20 TABLET, DELAYED RELEASE ORAL at 06:24

## 2020-01-01 RX ADMIN — Medication 250 MILLIGRAM(S): at 18:36

## 2020-01-01 RX ADMIN — CHLORHEXIDINE GLUCONATE 1 APPLICATION(S): 213 SOLUTION TOPICAL at 05:25

## 2020-01-01 RX ADMIN — MEROPENEM 100 MILLIGRAM(S): 1 INJECTION INTRAVENOUS at 22:36

## 2020-01-01 RX ADMIN — Medication 3 TABLET(S): at 14:29

## 2020-01-01 RX ADMIN — MEROPENEM 100 MILLIGRAM(S): 1 INJECTION INTRAVENOUS at 22:17

## 2020-01-01 RX ADMIN — Medication 4 PUFF(S): at 14:53

## 2020-01-01 RX ADMIN — CHLORHEXIDINE GLUCONATE 15 MILLILITER(S): 213 SOLUTION TOPICAL at 18:11

## 2020-01-01 RX ADMIN — GABAPENTIN 300 MILLIGRAM(S): 400 CAPSULE ORAL at 22:17

## 2020-01-01 RX ADMIN — TACROLIMUS 0.5 MILLIGRAM(S): 5 CAPSULE ORAL at 18:04

## 2020-01-01 RX ADMIN — Medication 50 MILLIEQUIVALENT(S): at 09:00

## 2020-01-01 RX ADMIN — PREGABALIN 1000 MICROGRAM(S): 225 CAPSULE ORAL at 12:15

## 2020-01-01 RX ADMIN — CHLORHEXIDINE GLUCONATE 15 MILLILITER(S): 213 SOLUTION TOPICAL at 17:04

## 2020-01-01 RX ADMIN — AMLODIPINE BESYLATE 10 MILLIGRAM(S): 2.5 TABLET ORAL at 05:35

## 2020-01-01 RX ADMIN — Medication 2 MILLIGRAM(S): at 15:06

## 2020-01-01 RX ADMIN — Medication 1 TABLET(S): at 12:15

## 2020-01-01 RX ADMIN — Medication 650 MILLIGRAM(S): at 18:45

## 2020-01-01 RX ADMIN — Medication 1 PUFF(S): at 14:58

## 2020-01-01 RX ADMIN — Medication 1 PUFF(S): at 13:43

## 2020-01-01 RX ADMIN — MEROPENEM 100 MILLIGRAM(S): 1 INJECTION INTRAVENOUS at 21:03

## 2020-01-01 RX ADMIN — MONTELUKAST 10 MILLIGRAM(S): 4 TABLET, CHEWABLE ORAL at 21:12

## 2020-01-01 RX ADMIN — DESMOPRESSIN ACETATE 224 MICROGRAM(S): 0.1 TABLET ORAL at 17:49

## 2020-01-01 RX ADMIN — MEROPENEM 100 MILLIGRAM(S): 1 INJECTION INTRAVENOUS at 22:23

## 2020-01-01 RX ADMIN — ALBUTEROL 4 PUFF(S): 90 AEROSOL, METERED ORAL at 06:51

## 2020-01-01 RX ADMIN — Medication 4: at 11:10

## 2020-01-01 RX ADMIN — DEXMEDETOMIDINE HYDROCHLORIDE IN 0.9% SODIUM CHLORIDE 3.61 MICROGRAM(S)/KG/HR: 4 INJECTION INTRAVENOUS at 04:18

## 2020-01-01 RX ADMIN — MUPIROCIN 1 APPLICATION(S): 20 OINTMENT TOPICAL at 05:26

## 2020-01-01 RX ADMIN — Medication 1000 MILLIGRAM(S): at 14:31

## 2020-01-01 RX ADMIN — Medication 50 MILLIEQUIVALENT(S): at 16:19

## 2020-01-01 RX ADMIN — CALCITRIOL 0.25 MICROGRAM(S): 0.5 CAPSULE ORAL at 11:51

## 2020-01-01 RX ADMIN — MUPIROCIN 1 APPLICATION(S): 20 OINTMENT TOPICAL at 06:11

## 2020-01-01 RX ADMIN — Medication 40 MILLIEQUIVALENT(S): at 21:39

## 2020-01-01 RX ADMIN — Medication 11 UNIT(S): at 18:58

## 2020-01-01 RX ADMIN — PANTOPRAZOLE SODIUM 40 MILLIGRAM(S): 20 TABLET, DELAYED RELEASE ORAL at 05:23

## 2020-01-01 RX ADMIN — MIDAZOLAM HYDROCHLORIDE 1.51 MG/KG/HR: 1 INJECTION, SOLUTION INTRAMUSCULAR; INTRAVENOUS at 17:14

## 2020-01-01 RX ADMIN — INSULIN HUMAN 1 UNIT(S)/HR: 100 INJECTION, SOLUTION SUBCUTANEOUS at 03:59

## 2020-01-01 RX ADMIN — CHLORHEXIDINE GLUCONATE 15 MILLILITER(S): 213 SOLUTION TOPICAL at 18:35

## 2020-01-01 RX ADMIN — SODIUM CHLORIDE 50 MILLILITER(S): 9 INJECTION, SOLUTION INTRAVENOUS at 12:24

## 2020-01-01 RX ADMIN — Medication 30 MILLIGRAM(S): at 05:16

## 2020-01-01 RX ADMIN — Medication 6: at 06:09

## 2020-01-01 RX ADMIN — Medication 3 TABLET(S): at 05:07

## 2020-01-01 RX ADMIN — MUPIROCIN 1 APPLICATION(S): 20 OINTMENT TOPICAL at 18:03

## 2020-01-01 RX ADMIN — Medication 3 TABLET(S): at 14:26

## 2020-01-01 RX ADMIN — LIDOCAINE 1 PATCH: 4 CREAM TOPICAL at 12:39

## 2020-01-01 RX ADMIN — MEROPENEM 100 MILLIGRAM(S): 1 INJECTION INTRAVENOUS at 05:28

## 2020-01-01 RX ADMIN — Medication 3.38 MICROGRAM(S)/KG/MIN: at 07:00

## 2020-01-01 RX ADMIN — Medication 125 MILLIGRAM(S): at 08:37

## 2020-01-01 RX ADMIN — MIDAZOLAM HYDROCHLORIDE 2 MILLIGRAM(S): 1 INJECTION, SOLUTION INTRAMUSCULAR; INTRAVENOUS at 02:14

## 2020-01-01 RX ADMIN — PANTOPRAZOLE SODIUM 40 MILLIGRAM(S): 20 TABLET, DELAYED RELEASE ORAL at 06:09

## 2020-01-01 RX ADMIN — WARFARIN SODIUM 2 MILLIGRAM(S): 2.5 TABLET ORAL at 21:07

## 2020-01-01 RX ADMIN — MEROPENEM 100 MILLIGRAM(S): 1 INJECTION INTRAVENOUS at 05:19

## 2020-01-01 RX ADMIN — Medication 10 MILLIGRAM(S): at 06:15

## 2020-01-01 RX ADMIN — CHLORHEXIDINE GLUCONATE 1 APPLICATION(S): 213 SOLUTION TOPICAL at 12:17

## 2020-01-01 RX ADMIN — Medication 3 TABLET(S): at 13:20

## 2020-01-01 RX ADMIN — ALBUTEROL 4 PUFF(S): 90 AEROSOL, METERED ORAL at 21:53

## 2020-01-01 RX ADMIN — OXYCODONE HYDROCHLORIDE 10 MILLIGRAM(S): 5 TABLET ORAL at 09:30

## 2020-01-01 RX ADMIN — INSULIN HUMAN 4 UNIT(S)/HR: 100 INJECTION, SOLUTION SUBCUTANEOUS at 05:34

## 2020-01-01 RX ADMIN — MEROPENEM 100 MILLIGRAM(S): 1 INJECTION INTRAVENOUS at 13:50

## 2020-01-01 RX ADMIN — TACROLIMUS 0.5 MILLIGRAM(S): 5 CAPSULE ORAL at 05:34

## 2020-01-01 RX ADMIN — MEROPENEM 100 MILLIGRAM(S): 1 INJECTION INTRAVENOUS at 14:30

## 2020-01-01 RX ADMIN — Medication 50 MILLIEQUIVALENT(S): at 05:47

## 2020-01-01 RX ADMIN — GABAPENTIN 300 MILLIGRAM(S): 400 CAPSULE ORAL at 21:04

## 2020-01-01 RX ADMIN — Medication 1 TABLET(S): at 12:19

## 2020-01-01 RX ADMIN — ALBUTEROL 4 PUFF(S): 90 AEROSOL, METERED ORAL at 02:07

## 2020-01-01 RX ADMIN — LINEZOLID 600 MILLIGRAM(S): 600 INJECTION, SOLUTION INTRAVENOUS at 05:57

## 2020-01-01 RX ADMIN — TACROLIMUS 0.5 MILLIGRAM(S): 5 CAPSULE ORAL at 18:39

## 2020-01-01 RX ADMIN — Medication 1000 MILLIGRAM(S): at 19:44

## 2020-01-01 RX ADMIN — GABAPENTIN 300 MILLIGRAM(S): 400 CAPSULE ORAL at 23:44

## 2020-01-01 RX ADMIN — Medication 3 MILLILITER(S): at 07:44

## 2020-01-01 RX ADMIN — MONTELUKAST 10 MILLIGRAM(S): 4 TABLET, CHEWABLE ORAL at 23:44

## 2020-01-01 RX ADMIN — Medication 3 TABLET(S): at 06:11

## 2020-01-01 RX ADMIN — PREGABALIN 1000 MICROGRAM(S): 225 CAPSULE ORAL at 12:26

## 2020-01-01 RX ADMIN — Medication 2 TABLET(S): at 21:14

## 2020-01-01 RX ADMIN — Medication 60 MILLIGRAM(S): at 06:09

## 2020-01-01 RX ADMIN — DEXMEDETOMIDINE HYDROCHLORIDE IN 0.9% SODIUM CHLORIDE 3.77 MICROGRAM(S)/KG/HR: 4 INJECTION INTRAVENOUS at 18:53

## 2020-01-01 RX ADMIN — Medication 3 TABLET(S): at 21:24

## 2020-01-01 RX ADMIN — TACROLIMUS 0.5 MILLIGRAM(S): 5 CAPSULE ORAL at 18:09

## 2020-01-01 RX ADMIN — Medication 10 UNIT(S): at 23:54

## 2020-01-01 RX ADMIN — CHLORHEXIDINE GLUCONATE 1 APPLICATION(S): 213 SOLUTION TOPICAL at 05:52

## 2020-01-01 RX ADMIN — MONTELUKAST 10 MILLIGRAM(S): 4 TABLET, CHEWABLE ORAL at 21:52

## 2020-01-01 RX ADMIN — CHLORHEXIDINE GLUCONATE 15 MILLILITER(S): 213 SOLUTION TOPICAL at 18:18

## 2020-01-01 RX ADMIN — CEFEPIME 100 MILLIGRAM(S): 1 INJECTION, POWDER, FOR SOLUTION INTRAMUSCULAR; INTRAVENOUS at 05:44

## 2020-01-01 RX ADMIN — Medication 40 MILLIEQUIVALENT(S): at 10:16

## 2020-01-01 RX ADMIN — PANTOPRAZOLE SODIUM 40 MILLIGRAM(S): 20 TABLET, DELAYED RELEASE ORAL at 05:13

## 2020-01-01 RX ADMIN — Medication 60 MILLIGRAM(S): at 05:07

## 2020-01-01 RX ADMIN — FENTANYL CITRATE 3.75 MICROGRAM(S)/KG/HR: 50 INJECTION INTRAVENOUS at 17:07

## 2020-01-01 RX ADMIN — TACROLIMUS 0.5 MILLIGRAM(S): 5 CAPSULE ORAL at 06:22

## 2020-01-01 RX ADMIN — MEROPENEM 100 MILLIGRAM(S): 1 INJECTION INTRAVENOUS at 13:25

## 2020-01-01 RX ADMIN — LINEZOLID 600 MILLIGRAM(S): 600 INJECTION, SOLUTION INTRAVENOUS at 06:16

## 2020-01-01 RX ADMIN — Medication 3 TABLET(S): at 23:32

## 2020-01-01 RX ADMIN — Medication 50 MILLIGRAM(S): at 05:35

## 2020-01-01 RX ADMIN — MUPIROCIN 1 APPLICATION(S): 20 OINTMENT TOPICAL at 05:23

## 2020-01-01 RX ADMIN — Medication 250 MILLIGRAM(S): at 18:24

## 2020-01-01 RX ADMIN — Medication 1 PUFF(S): at 01:21

## 2020-01-01 RX ADMIN — PANTOPRAZOLE SODIUM 40 MILLIGRAM(S): 20 TABLET, DELAYED RELEASE ORAL at 06:11

## 2020-01-01 RX ADMIN — ALBUTEROL 4 PUFF(S): 90 AEROSOL, METERED ORAL at 07:54

## 2020-01-01 RX ADMIN — Medication 3 MILLILITER(S): at 09:43

## 2020-01-01 RX ADMIN — Medication 40 MILLIGRAM(S): at 17:04

## 2020-01-01 RX ADMIN — CISATRACURIUM BESYLATE 10 MILLIGRAM(S): 2 INJECTION INTRAVENOUS at 09:52

## 2020-01-01 RX ADMIN — Medication 1 TABLET(S): at 13:24

## 2020-01-01 RX ADMIN — OXYCODONE HYDROCHLORIDE 10 MILLIGRAM(S): 5 TABLET ORAL at 21:15

## 2020-01-01 RX ADMIN — INSULIN HUMAN 2 UNIT(S): 100 INJECTION, SOLUTION SUBCUTANEOUS at 19:29

## 2020-01-01 RX ADMIN — Medication 2 TABLET(S): at 05:24

## 2020-01-01 RX ADMIN — Medication 4 PUFF(S): at 20:54

## 2020-01-01 RX ADMIN — Medication 80 MILLIGRAM(S): at 13:04

## 2020-01-01 RX ADMIN — GABAPENTIN 300 MILLIGRAM(S): 400 CAPSULE ORAL at 21:01

## 2020-01-01 RX ADMIN — MONTELUKAST 10 MILLIGRAM(S): 4 TABLET, CHEWABLE ORAL at 21:18

## 2020-01-01 RX ADMIN — MUPIROCIN 1 APPLICATION(S): 20 OINTMENT TOPICAL at 19:28

## 2020-01-01 RX ADMIN — CHLORHEXIDINE GLUCONATE 15 MILLILITER(S): 213 SOLUTION TOPICAL at 18:45

## 2020-01-01 RX ADMIN — GABAPENTIN 300 MILLIGRAM(S): 400 CAPSULE ORAL at 22:20

## 2020-01-01 RX ADMIN — MONTELUKAST 10 MILLIGRAM(S): 4 TABLET, CHEWABLE ORAL at 01:11

## 2020-01-01 RX ADMIN — CHLORHEXIDINE GLUCONATE 15 MILLILITER(S): 213 SOLUTION TOPICAL at 05:11

## 2020-01-01 RX ADMIN — ALBUTEROL 4 PUFF(S): 90 AEROSOL, METERED ORAL at 09:31

## 2020-01-01 RX ADMIN — MUPIROCIN 1 APPLICATION(S): 20 OINTMENT TOPICAL at 06:34

## 2020-01-01 RX ADMIN — Medication 6 UNIT(S): at 08:14

## 2020-01-01 RX ADMIN — MUPIROCIN 1 APPLICATION(S): 20 OINTMENT TOPICAL at 06:03

## 2020-01-01 RX ADMIN — Medication 20 MILLIEQUIVALENT(S): at 19:44

## 2020-01-01 RX ADMIN — Medication 650 MILLIGRAM(S): at 13:04

## 2020-01-01 RX ADMIN — Medication 4 PUFF(S): at 02:14

## 2020-01-01 RX ADMIN — CHLORHEXIDINE GLUCONATE 15 MILLILITER(S): 213 SOLUTION TOPICAL at 18:39

## 2020-01-01 RX ADMIN — GABAPENTIN 300 MILLIGRAM(S): 400 CAPSULE ORAL at 13:31

## 2020-01-01 RX ADMIN — CALCITRIOL 0.25 MICROGRAM(S): 0.5 CAPSULE ORAL at 12:21

## 2020-01-01 RX ADMIN — AMLODIPINE BESYLATE 10 MILLIGRAM(S): 2.5 TABLET ORAL at 21:03

## 2020-01-01 RX ADMIN — Medication 650 MILLIGRAM(S): at 05:05

## 2020-01-01 RX ADMIN — PREGABALIN 1000 MICROGRAM(S): 225 CAPSULE ORAL at 11:59

## 2020-01-01 RX ADMIN — INSULIN HUMAN 4 UNIT(S)/HR: 100 INJECTION, SOLUTION SUBCUTANEOUS at 12:25

## 2020-01-01 RX ADMIN — OXYCODONE AND ACETAMINOPHEN 1 TABLET(S): 5; 325 TABLET ORAL at 12:39

## 2020-01-01 RX ADMIN — Medication 75 MILLIGRAM(S): at 17:15

## 2020-01-01 RX ADMIN — INSULIN HUMAN 4 UNIT(S)/HR: 100 INJECTION, SOLUTION SUBCUTANEOUS at 17:52

## 2020-01-01 RX ADMIN — Medication 4 PUFF(S): at 02:06

## 2020-01-01 RX ADMIN — Medication 4 PUFF(S): at 02:26

## 2020-01-01 RX ADMIN — Medication 650 MILLIGRAM(S): at 22:40

## 2020-01-01 RX ADMIN — Medication 3 TABLET(S): at 12:29

## 2020-01-01 RX ADMIN — Medication 2 TABLET(S): at 13:41

## 2020-01-01 RX ADMIN — Medication 4 PUFF(S): at 01:44

## 2020-01-01 RX ADMIN — SENNA PLUS 2 TABLET(S): 8.6 TABLET ORAL at 21:01

## 2020-01-01 RX ADMIN — ALBUTEROL 4 PUFF(S): 90 AEROSOL, METERED ORAL at 02:14

## 2020-01-01 RX ADMIN — AMLODIPINE BESYLATE 10 MILLIGRAM(S): 2.5 TABLET ORAL at 05:29

## 2020-01-01 RX ADMIN — GABAPENTIN 300 MILLIGRAM(S): 400 CAPSULE ORAL at 14:07

## 2020-01-01 RX ADMIN — ONDANSETRON 4 MILLIGRAM(S): 8 TABLET, FILM COATED ORAL at 12:18

## 2020-01-01 RX ADMIN — Medication 1 PUFF(S): at 13:45

## 2020-01-01 RX ADMIN — Medication 50 GRAM(S): at 04:58

## 2020-01-01 RX ADMIN — PREGABALIN 1000 MICROGRAM(S): 225 CAPSULE ORAL at 12:52

## 2020-01-01 RX ADMIN — CISATRACURIUM BESYLATE 8 MILLIGRAM(S): 2 INJECTION INTRAVENOUS at 10:49

## 2020-01-01 RX ADMIN — Medication 3 TABLET(S): at 06:10

## 2020-01-01 RX ADMIN — Medication 1 PUFF(S): at 08:53

## 2020-01-01 RX ADMIN — Medication 10 MILLIGRAM(S): at 05:22

## 2020-01-01 RX ADMIN — CHLORHEXIDINE GLUCONATE 1 APPLICATION(S): 213 SOLUTION TOPICAL at 06:26

## 2020-01-01 RX ADMIN — MUPIROCIN 1 APPLICATION(S): 20 OINTMENT TOPICAL at 06:18

## 2020-01-01 RX ADMIN — MIDAZOLAM HYDROCHLORIDE 2 MILLIGRAM(S): 1 INJECTION, SOLUTION INTRAMUSCULAR; INTRAVENOUS at 10:00

## 2020-01-01 RX ADMIN — CALCITRIOL 0.25 MICROGRAM(S): 0.5 CAPSULE ORAL at 14:48

## 2020-01-01 RX ADMIN — OXYCODONE HYDROCHLORIDE 10 MILLIGRAM(S): 5 TABLET ORAL at 05:22

## 2020-01-01 RX ADMIN — MONTELUKAST 10 MILLIGRAM(S): 4 TABLET, CHEWABLE ORAL at 22:26

## 2020-01-01 RX ADMIN — Medication 100 MILLIGRAM(S): at 06:16

## 2020-01-01 RX ADMIN — Medication 6 UNIT(S): at 13:19

## 2020-01-01 RX ADMIN — Medication 60 MILLIGRAM(S): at 05:56

## 2020-01-01 RX ADMIN — CASPOFUNGIN ACETATE 260 MILLIGRAM(S): 7 INJECTION, POWDER, LYOPHILIZED, FOR SOLUTION INTRAVENOUS at 21:06

## 2020-01-01 RX ADMIN — MIDAZOLAM HYDROCHLORIDE 2 MILLIGRAM(S): 1 INJECTION, SOLUTION INTRAMUSCULAR; INTRAVENOUS at 03:57

## 2020-01-01 RX ADMIN — PREGABALIN 1000 MICROGRAM(S): 225 CAPSULE ORAL at 12:20

## 2020-01-01 RX ADMIN — SODIUM CHLORIDE 1000 MILLILITER(S): 9 INJECTION, SOLUTION INTRAVENOUS at 01:00

## 2020-01-01 RX ADMIN — PANTOPRAZOLE SODIUM 40 MILLIGRAM(S): 20 TABLET, DELAYED RELEASE ORAL at 06:41

## 2020-01-01 RX ADMIN — MEROPENEM 100 MILLIGRAM(S): 1 INJECTION INTRAVENOUS at 05:33

## 2020-01-01 RX ADMIN — MONTELUKAST 10 MILLIGRAM(S): 4 TABLET, CHEWABLE ORAL at 21:01

## 2020-01-01 RX ADMIN — TACROLIMUS 0.5 MILLIGRAM(S): 5 CAPSULE ORAL at 05:09

## 2020-01-01 RX ADMIN — PANTOPRAZOLE SODIUM 40 MILLIGRAM(S): 20 TABLET, DELAYED RELEASE ORAL at 12:45

## 2020-01-01 RX ADMIN — MONTELUKAST 10 MILLIGRAM(S): 4 TABLET, CHEWABLE ORAL at 22:29

## 2020-01-01 RX ADMIN — MONTELUKAST 10 MILLIGRAM(S): 4 TABLET, CHEWABLE ORAL at 21:40

## 2020-01-01 RX ADMIN — MONTELUKAST 10 MILLIGRAM(S): 4 TABLET, CHEWABLE ORAL at 22:21

## 2020-01-01 RX ADMIN — DESMOPRESSIN ACETATE 224 MICROGRAM(S): 0.1 TABLET ORAL at 22:01

## 2020-01-01 RX ADMIN — Medication 1 PUFF(S): at 21:00

## 2020-01-01 RX ADMIN — TACROLIMUS 0.5 MILLIGRAM(S): 5 CAPSULE ORAL at 17:15

## 2020-01-01 RX ADMIN — INSULIN HUMAN 4 UNIT(S)/HR: 100 INJECTION, SOLUTION SUBCUTANEOUS at 14:02

## 2020-01-01 RX ADMIN — Medication 40 MILLIGRAM(S): at 05:38

## 2020-01-01 RX ADMIN — PREGABALIN 1000 MICROGRAM(S): 225 CAPSULE ORAL at 15:36

## 2020-01-01 RX ADMIN — CISATRACURIUM BESYLATE 10 MILLIGRAM(S): 2 INJECTION INTRAVENOUS at 08:51

## 2020-01-01 RX ADMIN — Medication 2: at 18:33

## 2020-01-01 RX ADMIN — Medication 1 PUFF(S): at 08:32

## 2020-01-01 RX ADMIN — TACROLIMUS 0.5 MILLIGRAM(S): 5 CAPSULE ORAL at 05:18

## 2020-01-01 RX ADMIN — Medication 2 TABLET(S): at 19:10

## 2020-01-01 RX ADMIN — Medication 4: at 06:07

## 2020-01-01 RX ADMIN — Medication 3 MILLILITER(S): at 15:53

## 2020-01-01 RX ADMIN — PROPOFOL 4.89 MICROGRAM(S)/KG/MIN: 10 INJECTION, EMULSION INTRAVENOUS at 09:13

## 2020-01-01 RX ADMIN — Medication 2: at 18:07

## 2020-01-01 RX ADMIN — INSULIN HUMAN 4 UNIT(S)/HR: 100 INJECTION, SOLUTION SUBCUTANEOUS at 17:28

## 2020-01-01 RX ADMIN — Medication 80 MILLIGRAM(S): at 23:43

## 2020-01-01 RX ADMIN — CALCITRIOL 0.25 MICROGRAM(S): 0.5 CAPSULE ORAL at 13:43

## 2020-01-01 RX ADMIN — Medication 60 MILLIGRAM(S): at 05:54

## 2020-01-01 RX ADMIN — GABAPENTIN 300 MILLIGRAM(S): 400 CAPSULE ORAL at 21:33

## 2020-01-01 RX ADMIN — MONTELUKAST 10 MILLIGRAM(S): 4 TABLET, CHEWABLE ORAL at 23:11

## 2020-01-01 RX ADMIN — Medication 166.67 MILLIGRAM(S): at 22:40

## 2020-01-01 RX ADMIN — CEFEPIME 100 MILLIGRAM(S): 1 INJECTION, POWDER, FOR SOLUTION INTRAMUSCULAR; INTRAVENOUS at 11:03

## 2020-01-01 RX ADMIN — Medication 4 PUFF(S): at 00:15

## 2020-01-01 RX ADMIN — Medication 3 MILLILITER(S): at 09:58

## 2020-01-01 RX ADMIN — ALBUTEROL 4 PUFF(S): 90 AEROSOL, METERED ORAL at 21:06

## 2020-01-01 RX ADMIN — Medication 50 MILLIGRAM(S): at 05:55

## 2020-01-01 RX ADMIN — OXYCODONE AND ACETAMINOPHEN 1 TABLET(S): 5; 325 TABLET ORAL at 23:08

## 2020-01-01 RX ADMIN — MEROPENEM 100 MILLIGRAM(S): 1 INJECTION INTRAVENOUS at 17:21

## 2020-01-01 RX ADMIN — Medication 3 MILLILITER(S): at 09:21

## 2020-01-01 RX ADMIN — Medication 125 MILLIGRAM(S): at 01:12

## 2020-01-01 RX ADMIN — Medication 1 TABLET(S): at 09:07

## 2020-01-01 RX ADMIN — Medication 4 PUFF(S): at 08:44

## 2020-01-01 RX ADMIN — GABAPENTIN 300 MILLIGRAM(S): 400 CAPSULE ORAL at 22:48

## 2020-01-01 RX ADMIN — Medication 125 MILLIGRAM(S): at 15:54

## 2020-01-01 RX ADMIN — TACROLIMUS 0.5 MILLIGRAM(S): 5 CAPSULE ORAL at 18:16

## 2020-01-01 RX ADMIN — Medication 3 MILLILITER(S): at 20:50

## 2020-01-01 RX ADMIN — TACROLIMUS 0.5 MILLIGRAM(S): 5 CAPSULE ORAL at 18:02

## 2020-01-01 RX ADMIN — INSULIN HUMAN 1 UNIT(S)/HR: 100 INJECTION, SOLUTION SUBCUTANEOUS at 07:00

## 2020-01-01 RX ADMIN — Medication 60 MILLIGRAM(S): at 22:27

## 2020-01-01 RX ADMIN — Medication 3 TABLET(S): at 21:33

## 2020-01-01 RX ADMIN — Medication 40 MILLIGRAM(S): at 06:34

## 2020-01-01 RX ADMIN — Medication 4 PUFF(S): at 06:51

## 2020-01-01 RX ADMIN — Medication 4 PUFF(S): at 21:54

## 2020-01-01 RX ADMIN — PANTOPRAZOLE SODIUM 40 MILLIGRAM(S): 20 TABLET, DELAYED RELEASE ORAL at 05:21

## 2020-01-01 RX ADMIN — FENTANYL CITRATE 50 MICROGRAM(S): 50 INJECTION INTRAVENOUS at 15:00

## 2020-01-01 RX ADMIN — CHLORHEXIDINE GLUCONATE 15 MILLILITER(S): 213 SOLUTION TOPICAL at 05:12

## 2020-01-01 RX ADMIN — CISATRACURIUM BESYLATE 13 MICROGRAM(S)/KG/MIN: 2 INJECTION INTRAVENOUS at 13:50

## 2020-01-01 RX ADMIN — CHLORHEXIDINE GLUCONATE 15 MILLILITER(S): 213 SOLUTION TOPICAL at 17:15

## 2020-01-01 RX ADMIN — CHLORHEXIDINE GLUCONATE 15 MILLILITER(S): 213 SOLUTION TOPICAL at 06:11

## 2020-01-01 RX ADMIN — Medication 1 TABLET(S): at 12:27

## 2020-01-01 RX ADMIN — PANTOPRAZOLE SODIUM 40 MILLIGRAM(S): 20 TABLET, DELAYED RELEASE ORAL at 06:14

## 2020-01-01 RX ADMIN — MUPIROCIN 1 APPLICATION(S): 20 OINTMENT TOPICAL at 05:55

## 2020-01-01 RX ADMIN — Medication 40 MILLIGRAM(S): at 05:23

## 2020-01-01 RX ADMIN — Medication 4 PUFF(S): at 21:53

## 2020-01-01 RX ADMIN — PREGABALIN 1000 MICROGRAM(S): 225 CAPSULE ORAL at 12:13

## 2020-01-01 RX ADMIN — Medication 100 MILLIGRAM(S): at 05:46

## 2020-01-01 RX ADMIN — Medication 3 MILLILITER(S): at 19:51

## 2020-01-01 RX ADMIN — CEFEPIME 100 MILLIGRAM(S): 1 INJECTION, POWDER, FOR SOLUTION INTRAMUSCULAR; INTRAVENOUS at 05:16

## 2020-01-01 RX ADMIN — Medication 2 TABLET(S): at 21:54

## 2020-01-01 RX ADMIN — DEXMEDETOMIDINE HYDROCHLORIDE IN 0.9% SODIUM CHLORIDE 3.61 MICROGRAM(S)/KG/HR: 4 INJECTION INTRAVENOUS at 20:31

## 2020-01-01 RX ADMIN — Medication 20 MILLIGRAM(S): at 05:48

## 2020-01-01 RX ADMIN — Medication 1000 MILLIGRAM(S): at 14:18

## 2020-01-01 RX ADMIN — FLUCONAZOLE 400 MILLIGRAM(S): 150 TABLET ORAL at 15:35

## 2020-01-01 RX ADMIN — CHLORHEXIDINE GLUCONATE 1 APPLICATION(S): 213 SOLUTION TOPICAL at 05:21

## 2020-01-01 RX ADMIN — Medication 1 PUFF(S): at 19:53

## 2020-01-01 RX ADMIN — Medication 60 MILLIGRAM(S): at 18:08

## 2020-01-01 RX ADMIN — GABAPENTIN 300 MILLIGRAM(S): 400 CAPSULE ORAL at 21:44

## 2020-01-01 RX ADMIN — Medication 2 TABLET(S): at 13:04

## 2020-01-01 RX ADMIN — Medication 3 MILLILITER(S): at 07:23

## 2020-01-01 RX ADMIN — ALBUTEROL 4 PUFF(S): 90 AEROSOL, METERED ORAL at 19:50

## 2020-01-01 RX ADMIN — Medication 3 MILLILITER(S): at 21:11

## 2020-01-01 RX ADMIN — FENTANYL CITRATE 50 MICROGRAM(S): 50 INJECTION INTRAVENOUS at 06:51

## 2020-01-01 RX ADMIN — Medication 3 TABLET(S): at 15:02

## 2020-01-01 RX ADMIN — Medication 1 TABLET(S): at 12:31

## 2020-01-01 RX ADMIN — CALCITRIOL 0.25 MICROGRAM(S): 0.5 CAPSULE ORAL at 16:21

## 2020-01-01 RX ADMIN — Medication 75 MILLIGRAM(S): at 08:05

## 2020-01-01 RX ADMIN — MUPIROCIN 1 APPLICATION(S): 20 OINTMENT TOPICAL at 17:13

## 2020-01-01 RX ADMIN — PREGABALIN 1000 MICROGRAM(S): 225 CAPSULE ORAL at 12:45

## 2020-01-01 RX ADMIN — CALCITRIOL 0.25 MICROGRAM(S): 0.5 CAPSULE ORAL at 12:15

## 2020-01-01 RX ADMIN — GABAPENTIN 300 MILLIGRAM(S): 400 CAPSULE ORAL at 21:52

## 2020-01-01 RX ADMIN — MUPIROCIN 1 APPLICATION(S): 20 OINTMENT TOPICAL at 17:06

## 2020-01-01 RX ADMIN — MIDAZOLAM HYDROCHLORIDE 2 MILLIGRAM(S): 1 INJECTION, SOLUTION INTRAMUSCULAR; INTRAVENOUS at 18:52

## 2020-01-01 RX ADMIN — GABAPENTIN 300 MILLIGRAM(S): 400 CAPSULE ORAL at 23:10

## 2020-01-01 RX ADMIN — Medication 75 MILLIGRAM(S): at 06:20

## 2020-01-01 RX ADMIN — Medication 6.77 MICROGRAM(S)/KG/MIN: at 14:39

## 2020-01-01 RX ADMIN — Medication 4 PUFF(S): at 09:52

## 2020-01-01 RX ADMIN — MEROPENEM 100 MILLIGRAM(S): 1 INJECTION INTRAVENOUS at 22:18

## 2020-01-01 RX ADMIN — MUPIROCIN 1 APPLICATION(S): 20 OINTMENT TOPICAL at 06:33

## 2020-01-01 RX ADMIN — Medication 3 MILLILITER(S): at 13:33

## 2020-01-01 RX ADMIN — Medication 8: at 13:25

## 2020-01-01 RX ADMIN — OXYCODONE AND ACETAMINOPHEN 1 TABLET(S): 5; 325 TABLET ORAL at 21:39

## 2020-01-01 RX ADMIN — FENTANYL CITRATE 3.61 MICROGRAM(S)/KG/HR: 50 INJECTION INTRAVENOUS at 07:00

## 2020-01-01 RX ADMIN — SENNA PLUS 2 TABLET(S): 8.6 TABLET ORAL at 21:59

## 2020-01-01 RX ADMIN — TACROLIMUS 0.5 MILLIGRAM(S): 5 CAPSULE ORAL at 20:44

## 2020-01-01 RX ADMIN — ALBUTEROL 4 PUFF(S): 90 AEROSOL, METERED ORAL at 18:32

## 2020-01-01 RX ADMIN — CASPOFUNGIN ACETATE 260 MILLIGRAM(S): 7 INJECTION, POWDER, LYOPHILIZED, FOR SOLUTION INTRAVENOUS at 15:16

## 2020-01-01 RX ADMIN — GABAPENTIN 300 MILLIGRAM(S): 400 CAPSULE ORAL at 22:29

## 2020-01-01 RX ADMIN — Medication 3 TABLET(S): at 05:56

## 2020-01-01 RX ADMIN — SODIUM CHLORIDE 75 MILLILITER(S): 9 INJECTION, SOLUTION INTRAVENOUS at 11:42

## 2020-01-01 RX ADMIN — Medication 1 PUFF(S): at 16:00

## 2020-01-01 RX ADMIN — Medication 3 TABLET(S): at 05:25

## 2020-01-01 RX ADMIN — TACROLIMUS 0.5 MILLIGRAM(S): 5 CAPSULE ORAL at 19:28

## 2020-01-01 RX ADMIN — CHLORHEXIDINE GLUCONATE 15 MILLILITER(S): 213 SOLUTION TOPICAL at 17:57

## 2020-01-01 RX ADMIN — Medication 80 MILLIGRAM(S): at 13:56

## 2020-01-01 RX ADMIN — DESMOPRESSIN ACETATE 224 MICROGRAM(S): 0.1 TABLET ORAL at 06:14

## 2020-01-01 RX ADMIN — GABAPENTIN 300 MILLIGRAM(S): 400 CAPSULE ORAL at 21:02

## 2020-01-01 RX ADMIN — FENTANYL CITRATE 3.77 MICROGRAM(S)/KG/HR: 50 INJECTION INTRAVENOUS at 06:44

## 2020-01-01 RX ADMIN — Medication 1 TABLET(S): at 13:47

## 2020-01-01 RX ADMIN — LINEZOLID 300 MILLIGRAM(S): 600 INJECTION, SOLUTION INTRAVENOUS at 17:13

## 2020-01-01 RX ADMIN — CHLORHEXIDINE GLUCONATE 1 APPLICATION(S): 213 SOLUTION TOPICAL at 05:45

## 2020-01-01 RX ADMIN — AZITHROMYCIN 500 MILLIGRAM(S): 500 TABLET, FILM COATED ORAL at 14:16

## 2020-01-01 RX ADMIN — CALCITRIOL 0.25 MICROGRAM(S): 0.5 CAPSULE ORAL at 12:12

## 2020-01-01 RX ADMIN — Medication 60 MILLIGRAM(S): at 05:49

## 2020-01-01 RX ADMIN — Medication 1000 MILLIGRAM(S): at 13:48

## 2020-01-01 RX ADMIN — MUPIROCIN 1 APPLICATION(S): 20 OINTMENT TOPICAL at 17:14

## 2020-01-01 RX ADMIN — ALBUTEROL 4 PUFF(S): 90 AEROSOL, METERED ORAL at 09:08

## 2020-01-01 RX ADMIN — Medication 125 MILLIGRAM(S): at 21:48

## 2020-01-01 RX ADMIN — Medication 50 MILLIGRAM(S): at 23:18

## 2020-01-01 RX ADMIN — Medication 3 TABLET(S): at 21:03

## 2020-01-01 RX ADMIN — PANTOPRAZOLE SODIUM 40 MILLIGRAM(S): 20 TABLET, DELAYED RELEASE ORAL at 06:55

## 2020-01-01 RX ADMIN — CHLORHEXIDINE GLUCONATE 1 APPLICATION(S): 213 SOLUTION TOPICAL at 06:20

## 2020-01-01 RX ADMIN — Medication 3 MILLILITER(S): at 19:59

## 2020-01-01 RX ADMIN — CEFEPIME 100 MILLIGRAM(S): 1 INJECTION, POWDER, FOR SOLUTION INTRAMUSCULAR; INTRAVENOUS at 05:17

## 2020-01-01 RX ADMIN — Medication 3 MILLILITER(S): at 20:04

## 2020-01-01 RX ADMIN — CHLORHEXIDINE GLUCONATE 15 MILLILITER(S): 213 SOLUTION TOPICAL at 05:06

## 2020-01-01 RX ADMIN — CHLORHEXIDINE GLUCONATE 1 APPLICATION(S): 213 SOLUTION TOPICAL at 06:14

## 2020-01-01 RX ADMIN — Medication 30 MILLIGRAM(S): at 06:14

## 2020-01-01 RX ADMIN — Medication 2: at 06:44

## 2020-01-01 RX ADMIN — Medication 4 PUFF(S): at 19:34

## 2020-01-01 RX ADMIN — Medication 100 MILLIGRAM(S): at 06:32

## 2020-01-01 RX ADMIN — Medication 4: at 08:13

## 2020-01-01 RX ADMIN — Medication 50 MILLIGRAM(S): at 05:06

## 2020-01-01 RX ADMIN — MUPIROCIN 1 APPLICATION(S): 20 OINTMENT TOPICAL at 18:18

## 2020-01-01 RX ADMIN — MUPIROCIN 1 APPLICATION(S): 20 OINTMENT TOPICAL at 17:05

## 2020-01-01 RX ADMIN — CASPOFUNGIN ACETATE 260 MILLIGRAM(S): 7 INJECTION, POWDER, LYOPHILIZED, FOR SOLUTION INTRAVENOUS at 13:56

## 2020-01-01 RX ADMIN — Medication 3 TABLET(S): at 14:40

## 2020-01-01 RX ADMIN — ONDANSETRON 4 MILLIGRAM(S): 8 TABLET, FILM COATED ORAL at 07:56

## 2020-01-01 RX ADMIN — PANTOPRAZOLE SODIUM 40 MILLIGRAM(S): 20 TABLET, DELAYED RELEASE ORAL at 06:18

## 2020-01-01 RX ADMIN — Medication 650 MILLIGRAM(S): at 21:30

## 2020-01-01 RX ADMIN — AMLODIPINE BESYLATE 10 MILLIGRAM(S): 2.5 TABLET ORAL at 05:24

## 2020-01-01 RX ADMIN — LINEZOLID 600 MILLIGRAM(S): 600 INJECTION, SOLUTION INTRAVENOUS at 05:49

## 2020-01-01 RX ADMIN — Medication 2 TABLET(S): at 05:04

## 2020-01-01 RX ADMIN — Medication 3 TABLET(S): at 05:31

## 2020-01-01 RX ADMIN — MIDAZOLAM HYDROCHLORIDE 1.44 MG/KG/HR: 1 INJECTION, SOLUTION INTRAMUSCULAR; INTRAVENOUS at 14:39

## 2020-01-01 RX ADMIN — Medication 1 PUFF(S): at 01:12

## 2020-01-01 RX ADMIN — PANTOPRAZOLE SODIUM 40 MILLIGRAM(S): 20 TABLET, DELAYED RELEASE ORAL at 17:29

## 2020-01-01 RX ADMIN — Medication 4 PUFF(S): at 08:05

## 2020-01-01 RX ADMIN — MONTELUKAST 10 MILLIGRAM(S): 4 TABLET, CHEWABLE ORAL at 21:58

## 2020-01-01 RX ADMIN — TACROLIMUS 0.5 MILLIGRAM(S): 5 CAPSULE ORAL at 06:32

## 2020-01-01 RX ADMIN — Medication 6: at 05:46

## 2020-01-01 RX ADMIN — Medication 3 MILLILITER(S): at 02:53

## 2020-01-01 RX ADMIN — Medication 1 PUFF(S): at 13:07

## 2020-01-01 RX ADMIN — TACROLIMUS 0.5 MILLIGRAM(S): 5 CAPSULE ORAL at 05:03

## 2020-01-01 RX ADMIN — INSULIN HUMAN 4 UNIT(S)/HR: 100 INJECTION, SOLUTION SUBCUTANEOUS at 21:01

## 2020-01-01 RX ADMIN — Medication 3 MILLILITER(S): at 20:44

## 2020-01-01 RX ADMIN — Medication 3 TABLET(S): at 21:40

## 2020-01-01 RX ADMIN — Medication 8 UNIT(S): at 22:40

## 2020-01-01 RX ADMIN — Medication 5 UNIT(S): at 20:11

## 2020-01-01 RX ADMIN — Medication 40 MILLIGRAM(S): at 05:32

## 2020-01-01 RX ADMIN — MUPIROCIN 1 APPLICATION(S): 20 OINTMENT TOPICAL at 05:52

## 2020-01-01 RX ADMIN — Medication 3 MILLILITER(S): at 09:42

## 2020-01-01 RX ADMIN — Medication 2: at 13:18

## 2020-01-01 RX ADMIN — Medication 1 TABLET(S): at 12:17

## 2020-01-01 RX ADMIN — Medication 50 MILLIGRAM(S): at 18:13

## 2020-01-01 RX ADMIN — Medication 60 MILLIGRAM(S): at 05:53

## 2020-01-01 RX ADMIN — OXYCODONE HYDROCHLORIDE 10 MILLIGRAM(S): 5 TABLET ORAL at 05:52

## 2020-01-01 RX ADMIN — CHLORHEXIDINE GLUCONATE 1 APPLICATION(S): 213 SOLUTION TOPICAL at 05:09

## 2020-01-01 RX ADMIN — Medication 650 MILLIGRAM(S): at 05:09

## 2020-01-01 RX ADMIN — CHLORHEXIDINE GLUCONATE 1 APPLICATION(S): 213 SOLUTION TOPICAL at 05:55

## 2020-01-01 RX ADMIN — Medication 2 TABLET(S): at 05:58

## 2020-01-01 RX ADMIN — INSULIN HUMAN 4 UNIT(S)/HR: 100 INJECTION, SOLUTION SUBCUTANEOUS at 21:12

## 2020-01-01 RX ADMIN — Medication 2 TABLET(S): at 14:18

## 2020-01-01 RX ADMIN — Medication 1 TABLET(S): at 11:51

## 2020-01-01 RX ADMIN — CHLORHEXIDINE GLUCONATE 15 MILLILITER(S): 213 SOLUTION TOPICAL at 05:25

## 2020-01-01 RX ADMIN — TACROLIMUS 0.5 MILLIGRAM(S): 5 CAPSULE ORAL at 06:35

## 2020-01-01 RX ADMIN — MONTELUKAST 10 MILLIGRAM(S): 4 TABLET, CHEWABLE ORAL at 21:31

## 2020-01-01 RX ADMIN — TACROLIMUS 0.5 MILLIGRAM(S): 5 CAPSULE ORAL at 18:23

## 2020-01-01 RX ADMIN — GABAPENTIN 300 MILLIGRAM(S): 400 CAPSULE ORAL at 22:23

## 2020-01-01 RX ADMIN — Medication 2 TABLET(S): at 22:29

## 2020-01-01 RX ADMIN — Medication 2 TABLET(S): at 05:05

## 2020-01-01 RX ADMIN — Medication 40 MILLIEQUIVALENT(S): at 15:20

## 2020-01-01 RX ADMIN — AMLODIPINE BESYLATE 5 MILLIGRAM(S): 2.5 TABLET ORAL at 06:41

## 2020-01-01 RX ADMIN — Medication 600 MILLILITER(S): at 04:00

## 2020-01-01 RX ADMIN — Medication 325 MILLIGRAM(S): at 12:18

## 2020-01-01 RX ADMIN — SODIUM CHLORIDE 50 MILLILITER(S): 9 INJECTION, SOLUTION INTRAVENOUS at 17:53

## 2020-01-01 RX ADMIN — Medication 3 MILLILITER(S): at 13:30

## 2020-01-01 RX ADMIN — TACROLIMUS 0.5 MILLIGRAM(S): 5 CAPSULE ORAL at 05:46

## 2020-01-01 RX ADMIN — MIDAZOLAM HYDROCHLORIDE 1.44 MG/KG/HR: 1 INJECTION, SOLUTION INTRAMUSCULAR; INTRAVENOUS at 05:50

## 2020-01-01 RX ADMIN — MUPIROCIN 1 APPLICATION(S): 20 OINTMENT TOPICAL at 21:00

## 2020-01-01 RX ADMIN — MORPHINE SULFATE 4 MG/HR: 50 CAPSULE, EXTENDED RELEASE ORAL at 12:25

## 2020-01-01 RX ADMIN — Medication 3 TABLET(S): at 14:13

## 2020-01-01 RX ADMIN — Medication 1 TABLET(S): at 15:35

## 2020-01-01 RX ADMIN — MEROPENEM 100 MILLIGRAM(S): 1 INJECTION INTRAVENOUS at 21:20

## 2020-01-01 RX ADMIN — CHLORHEXIDINE GLUCONATE 15 MILLILITER(S): 213 SOLUTION TOPICAL at 06:14

## 2020-01-01 RX ADMIN — Medication 250 MILLIGRAM(S): at 05:31

## 2020-01-01 RX ADMIN — Medication 650 MILLIGRAM(S): at 21:12

## 2020-01-01 RX ADMIN — Medication 1 PUFF(S): at 02:00

## 2020-01-01 RX ADMIN — INSULIN GLARGINE 18 UNIT(S): 100 INJECTION, SOLUTION SUBCUTANEOUS at 22:46

## 2020-01-01 RX ADMIN — Medication 30 MILLIGRAM(S): at 05:12

## 2020-01-01 RX ADMIN — Medication 3 TABLET(S): at 21:18

## 2020-01-01 RX ADMIN — MUPIROCIN 1 APPLICATION(S): 20 OINTMENT TOPICAL at 06:22

## 2020-01-01 RX ADMIN — Medication 1 TABLET(S): at 17:19

## 2020-01-01 RX ADMIN — Medication 6: at 23:48

## 2020-01-01 RX ADMIN — CALCITRIOL 0.25 MICROGRAM(S): 0.5 CAPSULE ORAL at 13:20

## 2020-01-01 RX ADMIN — CALCITRIOL 0.25 MICROGRAM(S): 0.5 CAPSULE ORAL at 12:33

## 2020-01-01 RX ADMIN — Medication 25 GRAM(S): at 11:59

## 2020-01-01 RX ADMIN — MUPIROCIN 1 APPLICATION(S): 20 OINTMENT TOPICAL at 05:44

## 2020-01-01 RX ADMIN — CHLORHEXIDINE GLUCONATE 15 MILLILITER(S): 213 SOLUTION TOPICAL at 17:43

## 2020-01-01 RX ADMIN — Medication 4 PUFF(S): at 20:57

## 2020-01-01 RX ADMIN — Medication 80 MILLIGRAM(S): at 11:04

## 2020-01-01 RX ADMIN — DEXMEDETOMIDINE HYDROCHLORIDE IN 0.9% SODIUM CHLORIDE 9.03 MICROGRAM(S)/KG/HR: 4 INJECTION INTRAVENOUS at 15:31

## 2020-01-01 RX ADMIN — Medication 50 MILLIGRAM(S): at 05:28

## 2020-01-01 RX ADMIN — Medication 20 MILLIGRAM(S): at 06:41

## 2020-01-01 RX ADMIN — Medication 25 GRAM(S): at 11:50

## 2020-01-01 RX ADMIN — OXYCODONE HYDROCHLORIDE 10 MILLIGRAM(S): 5 TABLET ORAL at 10:23

## 2020-01-01 RX ADMIN — SENNA PLUS 2 TABLET(S): 8.6 TABLET ORAL at 21:03

## 2020-01-01 RX ADMIN — ALBUTEROL 4 PUFF(S): 90 AEROSOL, METERED ORAL at 08:46

## 2020-01-01 RX ADMIN — Medication 40 MILLIEQUIVALENT(S): at 10:47

## 2020-01-01 RX ADMIN — SODIUM CHLORIDE 1000 MILLILITER(S): 9 INJECTION, SOLUTION INTRAVENOUS at 10:00

## 2020-01-01 RX ADMIN — Medication 3 TABLET(S): at 14:37

## 2020-01-01 RX ADMIN — SENNA PLUS 2 TABLET(S): 8.6 TABLET ORAL at 23:01

## 2020-01-01 RX ADMIN — OXYCODONE HYDROCHLORIDE 10 MILLIGRAM(S): 5 TABLET ORAL at 07:41

## 2020-01-01 RX ADMIN — MEROPENEM 100 MILLIGRAM(S): 1 INJECTION INTRAVENOUS at 14:41

## 2020-01-01 RX ADMIN — Medication 2 TABLET(S): at 05:15

## 2020-01-01 RX ADMIN — Medication 650 MILLIGRAM(S): at 21:22

## 2020-01-01 RX ADMIN — PANTOPRAZOLE SODIUM 40 MILLIGRAM(S): 20 TABLET, DELAYED RELEASE ORAL at 05:07

## 2020-01-01 RX ADMIN — TACROLIMUS 0.5 MILLIGRAM(S): 5 CAPSULE ORAL at 17:42

## 2020-01-01 RX ADMIN — Medication 20 MILLIGRAM(S): at 19:11

## 2020-01-01 RX ADMIN — OXYCODONE AND ACETAMINOPHEN 1 TABLET(S): 5; 325 TABLET ORAL at 00:54

## 2020-01-01 RX ADMIN — ALBUTEROL 4 PUFF(S): 90 AEROSOL, METERED ORAL at 09:15

## 2020-01-01 RX ADMIN — Medication 650 MILLIGRAM(S): at 14:27

## 2020-01-01 RX ADMIN — Medication 1 PUFF(S): at 15:54

## 2020-01-01 RX ADMIN — Medication 1 PUFF(S): at 20:45

## 2020-01-01 RX ADMIN — SODIUM CHLORIDE 500 MILLILITER(S): 9 INJECTION, SOLUTION INTRAVENOUS at 15:10

## 2020-01-01 RX ADMIN — PANTOPRAZOLE SODIUM 40 MILLIGRAM(S): 20 TABLET, DELAYED RELEASE ORAL at 09:07

## 2020-01-01 RX ADMIN — Medication 250 MILLIGRAM(S): at 18:46

## 2020-01-01 RX ADMIN — FENTANYL CITRATE 3.61 MICROGRAM(S)/KG/HR: 50 INJECTION INTRAVENOUS at 03:57

## 2020-01-01 RX ADMIN — Medication 1000 MILLIGRAM(S): at 05:20

## 2020-01-01 RX ADMIN — Medication 3 TABLET(S): at 14:50

## 2020-01-01 RX ADMIN — OXYCODONE HYDROCHLORIDE 10 MILLIGRAM(S): 5 TABLET ORAL at 04:13

## 2020-01-01 RX ADMIN — Medication 650 MILLIGRAM(S): at 05:24

## 2020-01-01 RX ADMIN — MIDAZOLAM HYDROCHLORIDE 1.44 MG/KG/HR: 1 INJECTION, SOLUTION INTRAMUSCULAR; INTRAVENOUS at 04:17

## 2020-01-01 RX ADMIN — Medication 60 MILLIGRAM(S): at 05:12

## 2020-01-01 RX ADMIN — ALBUTEROL 4 PUFF(S): 90 AEROSOL, METERED ORAL at 09:52

## 2020-01-01 RX ADMIN — Medication 3 TABLET(S): at 15:58

## 2020-01-01 RX ADMIN — Medication 75 MEQ/KG/HR: at 22:35

## 2020-01-01 RX ADMIN — Medication 3 TABLET(S): at 22:27

## 2020-01-01 RX ADMIN — Medication 1 TABLET(S): at 13:20

## 2020-01-01 RX ADMIN — Medication 3 MILLILITER(S): at 01:19

## 2020-01-01 RX ADMIN — WARFARIN SODIUM 4 MILLIGRAM(S): 2.5 TABLET ORAL at 21:58

## 2020-01-01 RX ADMIN — Medication 10 MILLIGRAM(S): at 20:36

## 2020-01-01 RX ADMIN — Medication 80 MILLIGRAM(S): at 16:09

## 2020-01-01 RX ADMIN — Medication 1 PUFF(S): at 14:51

## 2020-01-01 RX ADMIN — Medication 3 MILLILITER(S): at 08:35

## 2020-01-01 RX ADMIN — Medication 1 TABLET(S): at 13:07

## 2020-01-01 RX ADMIN — Medication 80 MILLIGRAM(S): at 21:04

## 2020-01-01 RX ADMIN — MUPIROCIN 1 APPLICATION(S): 20 OINTMENT TOPICAL at 06:15

## 2020-01-01 RX ADMIN — DEXMEDETOMIDINE HYDROCHLORIDE IN 0.9% SODIUM CHLORIDE 9.03 MICROGRAM(S)/KG/HR: 4 INJECTION INTRAVENOUS at 17:38

## 2020-01-01 RX ADMIN — Medication 3 MILLILITER(S): at 09:38

## 2020-01-01 RX ADMIN — MEROPENEM 100 MILLIGRAM(S): 1 INJECTION INTRAVENOUS at 21:46

## 2020-01-01 RX ADMIN — MEROPENEM 100 MILLIGRAM(S): 1 INJECTION INTRAVENOUS at 05:41

## 2020-01-01 RX ADMIN — MUPIROCIN 1 APPLICATION(S): 20 OINTMENT TOPICAL at 05:10

## 2020-01-01 RX ADMIN — Medication 3 TABLET(S): at 05:28

## 2020-01-01 RX ADMIN — CALCITRIOL 0.25 MICROGRAM(S): 0.5 CAPSULE ORAL at 13:25

## 2020-01-01 RX ADMIN — MUPIROCIN 1 APPLICATION(S): 20 OINTMENT TOPICAL at 18:57

## 2020-01-01 RX ADMIN — ONDANSETRON 4 MILLIGRAM(S): 8 TABLET, FILM COATED ORAL at 06:31

## 2020-01-01 RX ADMIN — Medication 80 MILLIGRAM(S): at 17:13

## 2020-01-01 RX ADMIN — MIDAZOLAM HYDROCHLORIDE 2 MILLIGRAM(S): 1 INJECTION, SOLUTION INTRAMUSCULAR; INTRAVENOUS at 21:39

## 2020-01-01 RX ADMIN — MUPIROCIN 1 APPLICATION(S): 20 OINTMENT TOPICAL at 05:53

## 2020-01-01 RX ADMIN — CHLORHEXIDINE GLUCONATE 15 MILLILITER(S): 213 SOLUTION TOPICAL at 18:36

## 2020-01-01 RX ADMIN — Medication 1 PUFF(S): at 14:08

## 2020-01-01 RX ADMIN — Medication 1 PUFF(S): at 21:32

## 2020-01-01 RX ADMIN — SENNA PLUS 2 TABLET(S): 8.6 TABLET ORAL at 21:12

## 2020-01-01 RX ADMIN — Medication 1 PUFF(S): at 01:15

## 2020-01-01 RX ADMIN — TACROLIMUS 0.5 MILLIGRAM(S): 5 CAPSULE ORAL at 17:24

## 2020-01-01 RX ADMIN — CALCITRIOL 0.25 MICROGRAM(S): 0.5 CAPSULE ORAL at 14:40

## 2020-01-01 RX ADMIN — TACROLIMUS 0.5 MILLIGRAM(S): 5 CAPSULE ORAL at 18:14

## 2020-01-01 RX ADMIN — FLUCONAZOLE 400 MILLIGRAM(S): 150 TABLET ORAL at 13:19

## 2020-01-01 RX ADMIN — MIDAZOLAM HYDROCHLORIDE 2 MILLIGRAM(S): 1 INJECTION, SOLUTION INTRAMUSCULAR; INTRAVENOUS at 05:00

## 2020-01-01 RX ADMIN — TACROLIMUS 0.5 MILLIGRAM(S): 5 CAPSULE ORAL at 08:05

## 2020-01-01 RX ADMIN — Medication 60 MILLIGRAM(S): at 17:14

## 2020-01-01 RX ADMIN — Medication 80 MILLIGRAM(S): at 00:18

## 2020-01-01 RX ADMIN — Medication 30 MILLIGRAM(S): at 21:58

## 2020-01-01 RX ADMIN — MUPIROCIN 1 APPLICATION(S): 20 OINTMENT TOPICAL at 18:22

## 2020-01-01 RX ADMIN — Medication 3 TABLET(S): at 22:21

## 2020-01-01 RX ADMIN — CHLORHEXIDINE GLUCONATE 15 MILLILITER(S): 213 SOLUTION TOPICAL at 05:17

## 2020-01-01 RX ADMIN — CHLORHEXIDINE GLUCONATE 15 MILLILITER(S): 213 SOLUTION TOPICAL at 18:08

## 2020-01-01 RX ADMIN — Medication 40 MILLIGRAM(S): at 14:01

## 2020-01-01 RX ADMIN — MIDAZOLAM HYDROCHLORIDE 1.44 MG/KG/HR: 1 INJECTION, SOLUTION INTRAMUSCULAR; INTRAVENOUS at 17:07

## 2020-01-01 RX ADMIN — Medication 3 TABLET(S): at 17:41

## 2020-01-01 RX ADMIN — Medication 1 TABLET(S): at 15:18

## 2020-01-01 RX ADMIN — MUPIROCIN 1 APPLICATION(S): 20 OINTMENT TOPICAL at 17:49

## 2020-01-01 RX ADMIN — Medication 250 MILLIGRAM(S): at 17:39

## 2020-01-01 RX ADMIN — ONDANSETRON 4 MILLIGRAM(S): 8 TABLET, FILM COATED ORAL at 11:19

## 2020-01-01 RX ADMIN — Medication 4 PUFF(S): at 21:07

## 2020-01-01 RX ADMIN — PREGABALIN 1000 MICROGRAM(S): 225 CAPSULE ORAL at 13:43

## 2020-01-01 RX ADMIN — MEROPENEM 100 MILLIGRAM(S): 1 INJECTION INTRAVENOUS at 06:09

## 2020-01-01 RX ADMIN — Medication 50 MILLIGRAM(S): at 01:04

## 2020-01-01 RX ADMIN — Medication 166.67 MILLIGRAM(S): at 23:35

## 2020-01-01 RX ADMIN — FENTANYL CITRATE 3.61 MICROGRAM(S)/KG/HR: 50 INJECTION INTRAVENOUS at 03:58

## 2020-01-01 RX ADMIN — PREGABALIN 1000 MICROGRAM(S): 225 CAPSULE ORAL at 12:55

## 2020-01-01 RX ADMIN — Medication 100 MILLIGRAM(S): at 18:24

## 2020-01-01 RX ADMIN — CALCITRIOL 0.25 MICROGRAM(S): 0.5 CAPSULE ORAL at 12:55

## 2020-01-01 RX ADMIN — Medication 1 TABLET(S): at 12:16

## 2020-01-01 RX ADMIN — Medication 4 PUFF(S): at 14:28

## 2020-01-01 RX ADMIN — OXYCODONE HYDROCHLORIDE 10 MILLIGRAM(S): 5 TABLET ORAL at 09:51

## 2020-01-01 RX ADMIN — MEROPENEM 100 MILLIGRAM(S): 1 INJECTION INTRAVENOUS at 17:40

## 2020-01-01 RX ADMIN — Medication 3 MILLILITER(S): at 01:26

## 2020-01-01 RX ADMIN — Medication 80 MILLIGRAM(S): at 05:14

## 2020-01-01 RX ADMIN — Medication 4: at 18:18

## 2020-01-01 RX ADMIN — MONTELUKAST 10 MILLIGRAM(S): 4 TABLET, CHEWABLE ORAL at 21:44

## 2020-01-01 RX ADMIN — MEROPENEM 100 MILLIGRAM(S): 1 INJECTION INTRAVENOUS at 21:05

## 2020-01-01 RX ADMIN — Medication 60 MILLIGRAM(S): at 22:17

## 2020-01-01 RX ADMIN — CHLORHEXIDINE GLUCONATE 15 MILLILITER(S): 213 SOLUTION TOPICAL at 17:22

## 2020-01-01 RX ADMIN — Medication 1 PUFF(S): at 20:53

## 2020-01-01 RX ADMIN — Medication 650 MILLIGRAM(S): at 12:45

## 2020-01-01 RX ADMIN — Medication 1 PUFF(S): at 08:25

## 2020-01-01 RX ADMIN — MEROPENEM 100 MILLIGRAM(S): 1 INJECTION INTRAVENOUS at 16:45

## 2020-01-01 RX ADMIN — CHLORHEXIDINE GLUCONATE 15 MILLILITER(S): 213 SOLUTION TOPICAL at 05:01

## 2020-01-01 RX ADMIN — Medication 20 MILLIGRAM(S): at 05:11

## 2020-01-01 RX ADMIN — MEROPENEM 100 MILLIGRAM(S): 1 INJECTION INTRAVENOUS at 14:03

## 2020-01-01 RX ADMIN — Medication 250 MILLIGRAM(S): at 18:00

## 2020-01-01 RX ADMIN — Medication 2 TABLET(S): at 15:35

## 2020-01-01 RX ADMIN — Medication 1 TABLET(S): at 11:59

## 2020-01-01 RX ADMIN — TACROLIMUS 0.5 MILLIGRAM(S): 5 CAPSULE ORAL at 17:40

## 2020-01-01 RX ADMIN — Medication 60 MILLIGRAM(S): at 05:22

## 2020-01-01 RX ADMIN — Medication 2 TABLET(S): at 12:51

## 2020-01-01 RX ADMIN — SODIUM CHLORIDE 1000 MILLILITER(S): 9 INJECTION INTRAMUSCULAR; INTRAVENOUS; SUBCUTANEOUS at 03:30

## 2020-01-01 RX ADMIN — Medication 50 MILLIGRAM(S): at 05:21

## 2020-01-01 RX ADMIN — ALBUTEROL 4 PUFF(S): 90 AEROSOL, METERED ORAL at 08:42

## 2020-01-01 RX ADMIN — PREGABALIN 1000 MICROGRAM(S): 225 CAPSULE ORAL at 12:29

## 2020-01-01 RX ADMIN — OXYCODONE HYDROCHLORIDE 10 MILLIGRAM(S): 5 TABLET ORAL at 11:41

## 2020-01-01 RX ADMIN — Medication 325 MILLIGRAM(S): at 11:54

## 2020-01-01 RX ADMIN — Medication 3 TABLET(S): at 05:17

## 2020-01-01 RX ADMIN — MEROPENEM 100 MILLIGRAM(S): 1 INJECTION INTRAVENOUS at 05:54

## 2020-01-01 RX ADMIN — Medication 80 MILLIGRAM(S): at 18:05

## 2020-01-01 RX ADMIN — PANTOPRAZOLE SODIUM 40 MILLIGRAM(S): 20 TABLET, DELAYED RELEASE ORAL at 06:02

## 2020-01-01 RX ADMIN — Medication 4 PUFF(S): at 08:33

## 2020-01-01 RX ADMIN — TACROLIMUS 0.5 MILLIGRAM(S): 5 CAPSULE ORAL at 06:41

## 2020-01-01 RX ADMIN — Medication 1 PUFF(S): at 21:18

## 2020-01-01 RX ADMIN — FENTANYL CITRATE 3.61 MICROGRAM(S)/KG/HR: 50 INJECTION INTRAVENOUS at 04:18

## 2020-01-01 RX ADMIN — PREGABALIN 1000 MICROGRAM(S): 225 CAPSULE ORAL at 12:44

## 2020-01-01 RX ADMIN — Medication 1 TABLET(S): at 12:46

## 2020-01-01 RX ADMIN — Medication 4 PUFF(S): at 09:09

## 2020-01-01 RX ADMIN — Medication 70 MILLILITER(S): at 05:09

## 2020-01-01 RX ADMIN — INSULIN HUMAN 4 UNIT(S)/HR: 100 INJECTION, SOLUTION SUBCUTANEOUS at 09:39

## 2020-01-01 RX ADMIN — Medication 2 TABLET(S): at 15:02

## 2020-01-01 RX ADMIN — Medication 20 MILLIGRAM(S): at 05:38

## 2020-01-01 RX ADMIN — Medication 40 MILLIGRAM(S): at 08:01

## 2020-01-01 RX ADMIN — PANTOPRAZOLE SODIUM 40 MILLIGRAM(S): 20 TABLET, DELAYED RELEASE ORAL at 11:40

## 2020-01-01 RX ADMIN — Medication 3 TABLET(S): at 13:54

## 2020-01-01 RX ADMIN — Medication 2 TABLET(S): at 05:46

## 2020-01-01 RX ADMIN — Medication 50 MILLIGRAM(S): at 06:43

## 2020-01-01 RX ADMIN — Medication 3 TABLET(S): at 22:48

## 2020-01-01 RX ADMIN — PANTOPRAZOLE SODIUM 40 MILLIGRAM(S): 20 TABLET, DELAYED RELEASE ORAL at 18:03

## 2020-01-01 RX ADMIN — CEFTRIAXONE 100 MILLIGRAM(S): 500 INJECTION, POWDER, FOR SOLUTION INTRAMUSCULAR; INTRAVENOUS at 18:03

## 2020-01-01 RX ADMIN — Medication 3 MILLILITER(S): at 17:45

## 2020-01-01 RX ADMIN — PANTOPRAZOLE SODIUM 40 MILLIGRAM(S): 20 TABLET, DELAYED RELEASE ORAL at 06:01

## 2020-01-01 RX ADMIN — MUPIROCIN 1 APPLICATION(S): 20 OINTMENT TOPICAL at 05:15

## 2020-01-01 RX ADMIN — MONTELUKAST 10 MILLIGRAM(S): 4 TABLET, CHEWABLE ORAL at 23:01

## 2020-01-01 RX ADMIN — MUPIROCIN 1 APPLICATION(S): 20 OINTMENT TOPICAL at 06:23

## 2020-01-01 RX ADMIN — Medication 30 MILLIGRAM(S): at 05:25

## 2020-01-01 RX ADMIN — Medication 2 TABLET(S): at 23:44

## 2020-01-01 RX ADMIN — ONDANSETRON 4 MILLIGRAM(S): 8 TABLET, FILM COATED ORAL at 06:02

## 2020-01-01 RX ADMIN — GABAPENTIN 300 MILLIGRAM(S): 400 CAPSULE ORAL at 22:21

## 2020-01-01 RX ADMIN — Medication 60 MILLIGRAM(S): at 05:50

## 2020-01-01 RX ADMIN — CEFEPIME 100 MILLIGRAM(S): 1 INJECTION, POWDER, FOR SOLUTION INTRAMUSCULAR; INTRAVENOUS at 05:07

## 2020-01-01 RX ADMIN — MEROPENEM 100 MILLIGRAM(S): 1 INJECTION INTRAVENOUS at 05:02

## 2020-01-01 RX ADMIN — CISATRACURIUM BESYLATE 13.57 MICROGRAM(S)/KG/MIN: 2 INJECTION INTRAVENOUS at 15:50

## 2020-01-01 RX ADMIN — TACROLIMUS 0.5 MILLIGRAM(S): 5 CAPSULE ORAL at 18:29

## 2020-01-01 RX ADMIN — GABAPENTIN 300 MILLIGRAM(S): 400 CAPSULE ORAL at 05:37

## 2020-01-01 RX ADMIN — INSULIN GLARGINE 18 UNIT(S): 100 INJECTION, SOLUTION SUBCUTANEOUS at 22:48

## 2020-01-01 RX ADMIN — PANTOPRAZOLE SODIUM 40 MILLIGRAM(S): 20 TABLET, DELAYED RELEASE ORAL at 14:26

## 2020-01-01 RX ADMIN — CALCITRIOL 0.25 MICROGRAM(S): 0.5 CAPSULE ORAL at 13:19

## 2020-01-01 RX ADMIN — TACROLIMUS 0.5 MILLIGRAM(S): 5 CAPSULE ORAL at 17:27

## 2020-01-01 RX ADMIN — INSULIN HUMAN 4 UNIT(S)/HR: 100 INJECTION, SOLUTION SUBCUTANEOUS at 22:35

## 2020-01-01 RX ADMIN — GABAPENTIN 100 MILLIGRAM(S): 400 CAPSULE ORAL at 13:27

## 2020-01-01 RX ADMIN — GABAPENTIN 300 MILLIGRAM(S): 400 CAPSULE ORAL at 21:12

## 2020-01-01 RX ADMIN — Medication 1 TABLET(S): at 12:52

## 2020-01-01 RX ADMIN — ALBUTEROL 4 PUFF(S): 90 AEROSOL, METERED ORAL at 14:53

## 2020-01-01 RX ADMIN — MEROPENEM 100 MILLIGRAM(S): 1 INJECTION INTRAVENOUS at 05:07

## 2020-01-01 RX ADMIN — GABAPENTIN 100 MILLIGRAM(S): 400 CAPSULE ORAL at 05:21

## 2020-01-01 RX ADMIN — Medication 1 TABLET(S): at 12:45

## 2020-01-01 RX ADMIN — SODIUM CHLORIDE 1000 MILLILITER(S): 9 INJECTION, SOLUTION INTRAVENOUS at 05:58

## 2020-01-01 RX ADMIN — Medication 1 PUFF(S): at 02:39

## 2020-01-01 RX ADMIN — FENTANYL CITRATE 3.77 MICROGRAM(S)/KG/HR: 50 INJECTION INTRAVENOUS at 22:36

## 2020-01-01 RX ADMIN — MUPIROCIN 1 APPLICATION(S): 20 OINTMENT TOPICAL at 18:08

## 2020-01-01 RX ADMIN — PANTOPRAZOLE SODIUM 40 MILLIGRAM(S): 20 TABLET, DELAYED RELEASE ORAL at 05:55

## 2020-01-01 RX ADMIN — OXYCODONE HYDROCHLORIDE 10 MILLIGRAM(S): 5 TABLET ORAL at 16:49

## 2020-01-01 RX ADMIN — MUPIROCIN 1 APPLICATION(S): 20 OINTMENT TOPICAL at 05:30

## 2020-01-01 RX ADMIN — Medication 3.53 MICROGRAM(S)/KG/MIN: at 20:11

## 2020-01-01 RX ADMIN — INSULIN HUMAN 4 UNIT(S)/HR: 100 INJECTION, SOLUTION SUBCUTANEOUS at 11:59

## 2020-01-01 RX ADMIN — FENTANYL CITRATE 3.77 MICROGRAM(S)/KG/HR: 50 INJECTION INTRAVENOUS at 18:49

## 2020-01-01 RX ADMIN — Medication 80 MILLIGRAM(S): at 05:16

## 2020-01-01 RX ADMIN — CALCITRIOL 0.25 MICROGRAM(S): 0.5 CAPSULE ORAL at 18:44

## 2020-01-01 RX ADMIN — GABAPENTIN 300 MILLIGRAM(S): 400 CAPSULE ORAL at 05:09

## 2020-01-01 RX ADMIN — OXYCODONE AND ACETAMINOPHEN 1 TABLET(S): 5; 325 TABLET ORAL at 20:29

## 2020-01-01 RX ADMIN — CHLORHEXIDINE GLUCONATE 15 MILLILITER(S): 213 SOLUTION TOPICAL at 05:14

## 2020-01-01 RX ADMIN — Medication 80 MILLIGRAM(S): at 22:26

## 2020-01-01 RX ADMIN — CHLORHEXIDINE GLUCONATE 15 MILLILITER(S): 213 SOLUTION TOPICAL at 06:30

## 2020-01-01 RX ADMIN — PANTOPRAZOLE SODIUM 40 MILLIGRAM(S): 20 TABLET, DELAYED RELEASE ORAL at 05:38

## 2020-01-01 RX ADMIN — Medication 25 GRAM(S): at 10:48

## 2020-01-01 RX ADMIN — TACROLIMUS 0.5 MILLIGRAM(S): 5 CAPSULE ORAL at 17:52

## 2020-01-01 RX ADMIN — INSULIN HUMAN 1 UNIT(S)/HR: 100 INJECTION, SOLUTION SUBCUTANEOUS at 14:30

## 2020-01-01 RX ADMIN — Medication 3 TABLET(S): at 05:35

## 2020-01-01 RX ADMIN — CHLORHEXIDINE GLUCONATE 15 MILLILITER(S): 213 SOLUTION TOPICAL at 18:12

## 2020-01-01 RX ADMIN — MUPIROCIN 1 APPLICATION(S): 20 OINTMENT TOPICAL at 19:33

## 2020-01-01 RX ADMIN — INSULIN HUMAN 4 UNIT(S): 100 INJECTION, SOLUTION SUBCUTANEOUS at 19:22

## 2020-01-01 RX ADMIN — TACROLIMUS 0.5 MILLIGRAM(S): 5 CAPSULE ORAL at 05:37

## 2020-01-01 RX ADMIN — Medication 3 MILLILITER(S): at 20:05

## 2020-01-01 RX ADMIN — Medication 3.38 MICROGRAM(S)/KG/MIN: at 05:53

## 2020-01-01 RX ADMIN — SODIUM CHLORIDE 1000 MILLILITER(S): 9 INJECTION, SOLUTION INTRAVENOUS at 19:43

## 2020-01-01 RX ADMIN — GABAPENTIN 300 MILLIGRAM(S): 400 CAPSULE ORAL at 21:22

## 2020-01-01 RX ADMIN — Medication 50 GRAM(S): at 15:06

## 2020-01-01 RX ADMIN — Medication 2 MILLIGRAM(S): at 13:00

## 2020-01-01 RX ADMIN — OXYCODONE HYDROCHLORIDE 10 MILLIGRAM(S): 5 TABLET ORAL at 03:43

## 2020-01-01 RX ADMIN — FENTANYL CITRATE 100 MICROGRAM(S): 50 INJECTION INTRAVENOUS at 10:55

## 2020-01-01 RX ADMIN — Medication 3 MILLILITER(S): at 15:50

## 2020-01-01 RX ADMIN — OXYCODONE HYDROCHLORIDE 10 MILLIGRAM(S): 5 TABLET ORAL at 12:10

## 2020-01-01 RX ADMIN — Medication 1 PUFF(S): at 08:18

## 2020-01-01 RX ADMIN — Medication 60 MILLIGRAM(S): at 06:17

## 2020-01-01 RX ADMIN — Medication 10 UNIT(S): at 21:40

## 2020-01-01 RX ADMIN — Medication 1 TABLET(S): at 12:41

## 2020-01-01 RX ADMIN — Medication 2 TABLET(S): at 21:30

## 2020-01-01 RX ADMIN — PANTOPRAZOLE SODIUM 40 MILLIGRAM(S): 20 TABLET, DELAYED RELEASE ORAL at 07:18

## 2020-01-01 RX ADMIN — Medication 50 MILLIGRAM(S): at 05:22

## 2020-01-01 RX ADMIN — PREGABALIN 1000 MICROGRAM(S): 225 CAPSULE ORAL at 13:20

## 2020-01-01 RX ADMIN — GABAPENTIN 300 MILLIGRAM(S): 400 CAPSULE ORAL at 01:12

## 2020-01-01 RX ADMIN — Medication 1000 MILLIGRAM(S): at 15:17

## 2020-01-01 RX ADMIN — MEROPENEM 100 MILLIGRAM(S): 1 INJECTION INTRAVENOUS at 21:44

## 2020-01-01 RX ADMIN — TACROLIMUS 0.5 MILLIGRAM(S): 5 CAPSULE ORAL at 05:23

## 2020-01-01 RX ADMIN — Medication 3 TABLET(S): at 05:49

## 2020-01-01 RX ADMIN — Medication 5 MILLIGRAM(S): at 08:01

## 2020-01-01 RX ADMIN — CHLORHEXIDINE GLUCONATE 15 MILLILITER(S): 213 SOLUTION TOPICAL at 17:48

## 2020-01-01 RX ADMIN — Medication 50 MILLIEQUIVALENT(S): at 13:50

## 2020-01-01 RX ADMIN — AZITHROMYCIN 255 MILLIGRAM(S): 500 TABLET, FILM COATED ORAL at 18:50

## 2020-01-01 RX ADMIN — Medication 40 MILLIGRAM(S): at 05:01

## 2020-01-01 RX ADMIN — Medication 125 MILLIGRAM(S): at 09:35

## 2020-01-01 RX ADMIN — GABAPENTIN 100 MILLIGRAM(S): 400 CAPSULE ORAL at 05:30

## 2020-01-01 RX ADMIN — Medication 80 MILLIGRAM(S): at 12:22

## 2020-01-01 RX ADMIN — Medication 325 MILLIGRAM(S): at 11:19

## 2020-01-01 RX ADMIN — Medication 60 MILLIGRAM(S): at 05:40

## 2020-01-01 RX ADMIN — Medication 3 TABLET(S): at 05:51

## 2020-01-01 RX ADMIN — GABAPENTIN 300 MILLIGRAM(S): 400 CAPSULE ORAL at 05:06

## 2020-01-01 RX ADMIN — Medication 3 TABLET(S): at 06:34

## 2020-01-01 RX ADMIN — Medication 650 MILLIGRAM(S): at 21:40

## 2020-01-01 RX ADMIN — Medication 4: at 06:04

## 2020-01-01 RX ADMIN — PANTOPRAZOLE SODIUM 40 MILLIGRAM(S): 20 TABLET, DELAYED RELEASE ORAL at 06:15

## 2020-01-01 RX ADMIN — MONTELUKAST 10 MILLIGRAM(S): 4 TABLET, CHEWABLE ORAL at 21:02

## 2020-01-01 RX ADMIN — Medication 3 MILLILITER(S): at 20:07

## 2020-01-01 RX ADMIN — Medication 2 TABLET(S): at 21:20

## 2020-01-01 RX ADMIN — Medication 30 MILLIGRAM(S): at 05:13

## 2020-01-01 RX ADMIN — AMLODIPINE BESYLATE 10 MILLIGRAM(S): 2.5 TABLET ORAL at 05:55

## 2020-01-01 RX ADMIN — Medication 4 PUFF(S): at 08:46

## 2020-01-01 RX ADMIN — INSULIN GLARGINE 50 UNIT(S): 100 INJECTION, SOLUTION SUBCUTANEOUS at 12:33

## 2020-01-01 RX ADMIN — Medication 4 PUFF(S): at 08:43

## 2020-01-01 RX ADMIN — Medication 3 TABLET(S): at 13:52

## 2020-01-01 RX ADMIN — ETHAMBUTOL HYDROCHLORIDE 1000 MILLIGRAM(S): 400 TABLET, FILM COATED ORAL at 14:16

## 2020-01-01 RX ADMIN — Medication 50 MILLIEQUIVALENT(S): at 11:02

## 2020-01-01 RX ADMIN — OXYCODONE AND ACETAMINOPHEN 1 TABLET(S): 5; 325 TABLET ORAL at 20:08

## 2020-01-01 RX ADMIN — Medication 1 TABLET(S): at 11:57

## 2020-01-01 RX ADMIN — Medication 1 PUFF(S): at 19:38

## 2020-01-01 RX ADMIN — MUPIROCIN 1 APPLICATION(S): 20 OINTMENT TOPICAL at 17:42

## 2020-01-01 RX ADMIN — Medication 3 TABLET(S): at 05:33

## 2020-01-01 RX ADMIN — DEXMEDETOMIDINE HYDROCHLORIDE IN 0.9% SODIUM CHLORIDE 10.19 MICROGRAM(S)/KG/HR: 4 INJECTION INTRAVENOUS at 18:19

## 2020-01-01 RX ADMIN — CHLORHEXIDINE GLUCONATE 1 APPLICATION(S): 213 SOLUTION TOPICAL at 05:14

## 2020-01-01 RX ADMIN — MIDAZOLAM HYDROCHLORIDE 2 MILLIGRAM(S): 1 INJECTION, SOLUTION INTRAMUSCULAR; INTRAVENOUS at 21:28

## 2020-01-01 RX ADMIN — Medication 80 MILLIGRAM(S): at 09:45

## 2020-01-01 RX ADMIN — Medication 11 UNIT(S): at 06:44

## 2020-01-01 RX ADMIN — Medication 3 TABLET(S): at 05:40

## 2020-01-01 RX ADMIN — Medication 3 TABLET(S): at 22:28

## 2020-01-01 RX ADMIN — Medication 3 TABLET(S): at 22:04

## 2020-01-01 RX ADMIN — OXYCODONE AND ACETAMINOPHEN 1 TABLET(S): 5; 325 TABLET ORAL at 16:02

## 2020-01-01 RX ADMIN — TACROLIMUS 0.5 MILLIGRAM(S): 5 CAPSULE ORAL at 05:15

## 2020-01-01 RX ADMIN — PANTOPRAZOLE SODIUM 40 MILLIGRAM(S): 20 TABLET, DELAYED RELEASE ORAL at 05:29

## 2020-01-01 RX ADMIN — ALBUTEROL 4 PUFF(S): 90 AEROSOL, METERED ORAL at 13:20

## 2020-01-01 RX ADMIN — GABAPENTIN 300 MILLIGRAM(S): 400 CAPSULE ORAL at 21:18

## 2020-01-01 RX ADMIN — Medication 3 TABLET(S): at 06:30

## 2020-01-01 RX ADMIN — Medication 60 MILLIGRAM(S): at 17:05

## 2020-01-01 RX ADMIN — MEROPENEM 100 MILLIGRAM(S): 1 INJECTION INTRAVENOUS at 14:07

## 2020-01-01 RX ADMIN — Medication 80 MILLIGRAM(S): at 22:29

## 2020-01-01 RX ADMIN — PANTOPRAZOLE SODIUM 40 MILLIGRAM(S): 20 TABLET, DELAYED RELEASE ORAL at 05:22

## 2020-01-01 RX ADMIN — MEROPENEM 100 MILLIGRAM(S): 1 INJECTION INTRAVENOUS at 14:49

## 2020-01-01 RX ADMIN — Medication 166.67 MILLIGRAM(S): at 11:55

## 2020-01-01 RX ADMIN — Medication 30 MILLIGRAM(S): at 05:05

## 2020-01-01 RX ADMIN — MUPIROCIN 1 APPLICATION(S): 20 OINTMENT TOPICAL at 17:48

## 2020-01-01 RX ADMIN — INSULIN HUMAN 4 UNIT(S)/HR: 100 INJECTION, SOLUTION SUBCUTANEOUS at 18:11

## 2020-01-01 RX ADMIN — PANTOPRAZOLE SODIUM 40 MILLIGRAM(S): 20 TABLET, DELAYED RELEASE ORAL at 05:35

## 2020-01-01 RX ADMIN — Medication 1 PUFF(S): at 14:35

## 2020-01-01 RX ADMIN — Medication 4 PUFF(S): at 19:50

## 2020-01-01 RX ADMIN — MUPIROCIN 1 APPLICATION(S): 20 OINTMENT TOPICAL at 19:26

## 2020-01-01 RX ADMIN — Medication 60 MILLIGRAM(S): at 05:57

## 2020-01-01 RX ADMIN — Medication 2 TABLET(S): at 14:29

## 2020-01-01 RX ADMIN — Medication 50 MILLIEQUIVALENT(S): at 06:11

## 2020-01-01 RX ADMIN — Medication 60 MILLIGRAM(S): at 05:26

## 2020-01-01 RX ADMIN — Medication 3 MILLILITER(S): at 07:53

## 2020-01-01 RX ADMIN — FENTANYL CITRATE 50 MICROGRAM(S): 50 INJECTION INTRAVENOUS at 00:00

## 2020-01-01 RX ADMIN — PANTOPRAZOLE SODIUM 40 MILLIGRAM(S): 20 TABLET, DELAYED RELEASE ORAL at 11:02

## 2020-01-01 RX ADMIN — ALBUTEROL 4 PUFF(S): 90 AEROSOL, METERED ORAL at 09:09

## 2020-01-01 RX ADMIN — TACROLIMUS 0.5 MILLIGRAM(S): 5 CAPSULE ORAL at 18:49

## 2020-01-01 RX ADMIN — Medication 2: at 16:05

## 2020-01-01 RX ADMIN — Medication 1 PUFF(S): at 02:04

## 2020-01-01 RX ADMIN — Medication 4 PUFF(S): at 09:16

## 2020-01-01 RX ADMIN — LIDOCAINE 1 PATCH: 4 CREAM TOPICAL at 00:25

## 2020-01-01 RX ADMIN — MUPIROCIN 1 APPLICATION(S): 20 OINTMENT TOPICAL at 18:09

## 2020-01-01 RX ADMIN — Medication 80 MILLIGRAM(S): at 20:00

## 2020-01-01 RX ADMIN — TACROLIMUS 0.5 MILLIGRAM(S): 5 CAPSULE ORAL at 05:30

## 2020-01-01 RX ADMIN — PANTOPRAZOLE SODIUM 40 MILLIGRAM(S): 20 TABLET, DELAYED RELEASE ORAL at 12:51

## 2020-01-01 RX ADMIN — Medication 80 MILLIGRAM(S): at 05:12

## 2020-01-01 RX ADMIN — Medication 1 TABLET(S): at 12:28

## 2020-01-01 RX ADMIN — LIDOCAINE 1 PATCH: 4 CREAM TOPICAL at 19:55

## 2020-01-01 RX ADMIN — MUPIROCIN 1 APPLICATION(S): 20 OINTMENT TOPICAL at 18:54

## 2020-01-01 RX ADMIN — CALCITRIOL 0.25 MICROGRAM(S): 0.5 CAPSULE ORAL at 12:30

## 2020-01-01 RX ADMIN — GABAPENTIN 100 MILLIGRAM(S): 400 CAPSULE ORAL at 19:28

## 2020-01-01 RX ADMIN — Medication 10 MILLIGRAM(S): at 05:09

## 2020-01-01 RX ADMIN — Medication 80 MILLIGRAM(S): at 15:36

## 2020-01-01 RX ADMIN — Medication 50 MILLIGRAM(S): at 17:10

## 2020-01-01 RX ADMIN — Medication 6 UNIT(S): at 08:13

## 2020-01-01 RX ADMIN — Medication 40 MILLIEQUIVALENT(S): at 18:48

## 2020-01-01 RX ADMIN — CISATRACURIUM BESYLATE 13 MICROGRAM(S)/KG/MIN: 2 INJECTION INTRAVENOUS at 07:00

## 2020-01-01 RX ADMIN — Medication 3 TABLET(S): at 13:34

## 2020-01-01 RX ADMIN — LACTULOSE 10 GRAM(S): 10 SOLUTION ORAL at 05:12

## 2020-01-01 RX ADMIN — MEROPENEM 100 MILLIGRAM(S): 1 INJECTION INTRAVENOUS at 05:08

## 2020-01-01 RX ADMIN — ALBUTEROL 4 PUFF(S): 90 AEROSOL, METERED ORAL at 14:38

## 2020-01-01 RX ADMIN — MIDAZOLAM HYDROCHLORIDE 1.44 MG/KG/HR: 1 INJECTION, SOLUTION INTRAMUSCULAR; INTRAVENOUS at 07:00

## 2020-01-01 RX ADMIN — Medication 3 TABLET(S): at 21:02

## 2020-01-01 RX ADMIN — Medication 20 MILLIGRAM(S): at 05:13

## 2020-01-01 RX ADMIN — Medication 4 PUFF(S): at 07:54

## 2020-01-01 RX ADMIN — PANTOPRAZOLE SODIUM 40 MILLIGRAM(S): 20 TABLET, DELAYED RELEASE ORAL at 12:27

## 2020-01-01 RX ADMIN — GABAPENTIN 100 MILLIGRAM(S): 400 CAPSULE ORAL at 05:22

## 2020-01-01 RX ADMIN — Medication 2 TABLET(S): at 05:13

## 2020-01-01 RX ADMIN — PREGABALIN 1000 MICROGRAM(S): 225 CAPSULE ORAL at 12:11

## 2020-01-01 RX ADMIN — Medication 100 MILLIGRAM(S): at 17:04

## 2020-01-01 RX ADMIN — Medication 50 GRAM(S): at 10:39

## 2020-01-01 RX ADMIN — TACROLIMUS 0.5 MILLIGRAM(S): 5 CAPSULE ORAL at 18:28

## 2020-01-01 RX ADMIN — Medication 75 MILLIGRAM(S): at 17:27

## 2020-01-01 RX ADMIN — MEROPENEM 100 MILLIGRAM(S): 1 INJECTION INTRAVENOUS at 10:37

## 2020-01-01 RX ADMIN — PROPOFOL 4.52 MICROGRAM(S)/KG/MIN: 10 INJECTION, EMULSION INTRAVENOUS at 12:18

## 2020-01-01 RX ADMIN — Medication 3 TABLET(S): at 17:16

## 2020-01-01 RX ADMIN — Medication 1 PUFF(S): at 08:13

## 2020-01-01 RX ADMIN — Medication 40 MILLIGRAM(S): at 06:31

## 2020-01-01 RX ADMIN — Medication 125 MILLIGRAM(S): at 03:49

## 2020-01-01 RX ADMIN — CALCITRIOL 0.25 MICROGRAM(S): 0.5 CAPSULE ORAL at 12:18

## 2020-01-01 RX ADMIN — Medication 1 PUFF(S): at 13:55

## 2020-01-01 RX ADMIN — PANTOPRAZOLE SODIUM 40 MILLIGRAM(S): 20 TABLET, DELAYED RELEASE ORAL at 12:14

## 2020-01-01 RX ADMIN — GABAPENTIN 300 MILLIGRAM(S): 400 CAPSULE ORAL at 21:20

## 2020-01-01 RX ADMIN — Medication 1 PUFF(S): at 01:13

## 2020-01-01 RX ADMIN — Medication 3 TABLET(S): at 22:35

## 2020-01-01 RX ADMIN — PREGABALIN 1000 MICROGRAM(S): 225 CAPSULE ORAL at 12:31

## 2020-01-01 RX ADMIN — CHLORHEXIDINE GLUCONATE 15 MILLILITER(S): 213 SOLUTION TOPICAL at 18:29

## 2020-01-01 RX ADMIN — Medication 3 TABLET(S): at 01:12

## 2020-01-01 RX ADMIN — CASPOFUNGIN ACETATE 260 MILLIGRAM(S): 7 INJECTION, POWDER, LYOPHILIZED, FOR SOLUTION INTRAVENOUS at 21:45

## 2020-01-01 RX ADMIN — CHLORHEXIDINE GLUCONATE 1 APPLICATION(S): 213 SOLUTION TOPICAL at 05:29

## 2020-01-01 RX ADMIN — Medication 1 PUFF(S): at 20:35

## 2020-01-01 RX ADMIN — CHLORHEXIDINE GLUCONATE 15 MILLILITER(S): 213 SOLUTION TOPICAL at 18:04

## 2020-01-01 RX ADMIN — Medication 4 PUFF(S): at 18:32

## 2020-01-01 RX ADMIN — MUPIROCIN 1 APPLICATION(S): 20 OINTMENT TOPICAL at 17:20

## 2020-01-01 RX ADMIN — Medication 40 MILLIGRAM(S): at 09:13

## 2020-01-01 RX ADMIN — PANTOPRAZOLE SODIUM 40 MILLIGRAM(S): 20 TABLET, DELAYED RELEASE ORAL at 06:03

## 2020-01-01 RX ADMIN — Medication 1 TABLET(S): at 12:18

## 2020-01-01 RX ADMIN — ALBUTEROL 4 PUFF(S): 90 AEROSOL, METERED ORAL at 13:37

## 2020-01-01 RX ADMIN — FENTANYL CITRATE 3.77 MICROGRAM(S)/KG/HR: 50 INJECTION INTRAVENOUS at 03:50

## 2020-01-01 RX ADMIN — ALBUTEROL 4 PUFF(S): 90 AEROSOL, METERED ORAL at 14:19

## 2020-01-01 RX ADMIN — Medication 3 MILLILITER(S): at 11:55

## 2020-01-01 RX ADMIN — MIDAZOLAM HYDROCHLORIDE 1.51 MG/KG/HR: 1 INJECTION, SOLUTION INTRAMUSCULAR; INTRAVENOUS at 20:12

## 2020-01-01 RX ADMIN — Medication 60 MILLIGRAM(S): at 12:31

## 2020-01-01 RX ADMIN — PREGABALIN 1000 MICROGRAM(S): 225 CAPSULE ORAL at 11:03

## 2020-01-01 RX ADMIN — Medication 1: at 17:10

## 2020-01-01 RX ADMIN — MEROPENEM 100 MILLIGRAM(S): 1 INJECTION INTRAVENOUS at 13:46

## 2020-01-01 RX ADMIN — Medication 3 TABLET(S): at 13:25

## 2020-01-01 RX ADMIN — Medication 60 MILLIGRAM(S): at 06:22

## 2020-01-01 RX ADMIN — Medication 50 MILLIEQUIVALENT(S): at 12:27

## 2020-01-01 RX ADMIN — Medication 1 PUFF(S): at 04:24

## 2020-01-01 RX ADMIN — GABAPENTIN 300 MILLIGRAM(S): 400 CAPSULE ORAL at 22:26

## 2020-01-01 RX ADMIN — CHLORHEXIDINE GLUCONATE 1 APPLICATION(S): 213 SOLUTION TOPICAL at 06:30

## 2020-01-01 RX ADMIN — Medication 1000 MILLIGRAM(S): at 05:30

## 2020-01-01 RX ADMIN — Medication 3 TABLET(S): at 05:02

## 2020-01-01 RX ADMIN — AMLODIPINE BESYLATE 10 MILLIGRAM(S): 2.5 TABLET ORAL at 21:44

## 2020-01-01 RX ADMIN — Medication 2 TABLET(S): at 05:16

## 2020-01-01 RX ADMIN — MUPIROCIN 1 APPLICATION(S): 20 OINTMENT TOPICAL at 18:17

## 2020-01-01 RX ADMIN — GABAPENTIN 300 MILLIGRAM(S): 400 CAPSULE ORAL at 22:37

## 2020-01-01 RX ADMIN — SODIUM CHLORIDE 75 MILLILITER(S): 9 INJECTION, SOLUTION INTRAVENOUS at 14:02

## 2020-01-01 RX ADMIN — Medication 1 PUFF(S): at 07:53

## 2020-01-01 RX ADMIN — SENNA PLUS 2 TABLET(S): 8.6 TABLET ORAL at 21:44

## 2020-01-01 RX ADMIN — CHLORHEXIDINE GLUCONATE 1 APPLICATION(S): 213 SOLUTION TOPICAL at 05:51

## 2020-01-01 RX ADMIN — TACROLIMUS 0.5 MILLIGRAM(S): 5 CAPSULE ORAL at 18:18

## 2020-01-01 RX ADMIN — Medication 10 MILLIGRAM(S): at 06:41

## 2020-01-01 RX ADMIN — MEROPENEM 100 MILLIGRAM(S): 1 INJECTION INTRAVENOUS at 06:41

## 2020-01-01 RX ADMIN — Medication 250 MILLIGRAM(S): at 06:15

## 2020-01-01 RX ADMIN — FENTANYL CITRATE 3.77 MICROGRAM(S)/KG/HR: 50 INJECTION INTRAVENOUS at 01:04

## 2020-01-01 RX ADMIN — Medication 4 PUFF(S): at 09:32

## 2020-01-01 RX ADMIN — MONTELUKAST 10 MILLIGRAM(S): 4 TABLET, CHEWABLE ORAL at 22:04

## 2020-01-01 RX ADMIN — Medication 10 MILLIGRAM(S): at 15:02

## 2020-01-01 RX ADMIN — GABAPENTIN 300 MILLIGRAM(S): 400 CAPSULE ORAL at 21:24

## 2020-01-01 RX ADMIN — CHLORHEXIDINE GLUCONATE 15 MILLILITER(S): 213 SOLUTION TOPICAL at 05:44

## 2020-01-01 RX ADMIN — Medication 60 MILLIGRAM(S): at 05:16

## 2020-01-01 RX ADMIN — MIDAZOLAM HYDROCHLORIDE 0.81 MG/KG/HR: 1 INJECTION, SOLUTION INTRAMUSCULAR; INTRAVENOUS at 18:28

## 2020-01-01 RX ADMIN — INSULIN GLARGINE 18 UNIT(S): 100 INJECTION, SOLUTION SUBCUTANEOUS at 21:52

## 2020-01-01 RX ADMIN — OXYCODONE AND ACETAMINOPHEN 1 TABLET(S): 5; 325 TABLET ORAL at 13:37

## 2020-01-01 RX ADMIN — GABAPENTIN 300 MILLIGRAM(S): 400 CAPSULE ORAL at 22:27

## 2020-01-01 RX ADMIN — Medication 3 TABLET(S): at 05:50

## 2020-01-01 RX ADMIN — Medication 650 MILLIGRAM(S): at 18:29

## 2020-01-01 RX ADMIN — PREGABALIN 1000 MICROGRAM(S): 225 CAPSULE ORAL at 11:52

## 2020-01-01 RX ADMIN — Medication 2: at 18:35

## 2020-01-01 RX ADMIN — Medication 300 MILLIGRAM(S): at 10:55

## 2020-01-01 RX ADMIN — ALBUTEROL 4 PUFF(S): 90 AEROSOL, METERED ORAL at 00:15

## 2020-01-01 RX ADMIN — Medication 3 TABLET(S): at 21:52

## 2020-01-01 RX ADMIN — Medication 6.77 MICROGRAM(S)/KG/MIN: at 21:03

## 2020-01-01 RX ADMIN — Medication 3 MILLILITER(S): at 02:11

## 2020-01-01 RX ADMIN — ALBUTEROL 4 PUFF(S): 90 AEROSOL, METERED ORAL at 14:05

## 2020-01-01 RX ADMIN — Medication 3.38 MICROGRAM(S)/KG/MIN: at 03:57

## 2020-01-01 RX ADMIN — MEROPENEM 100 MILLIGRAM(S): 1 INJECTION INTRAVENOUS at 06:15

## 2020-01-01 RX ADMIN — Medication 100.5 MILLIGRAM(S): at 09:39

## 2020-01-01 RX ADMIN — MUPIROCIN 1 APPLICATION(S): 20 OINTMENT TOPICAL at 18:12

## 2020-01-01 RX ADMIN — OXYCODONE HYDROCHLORIDE 10 MILLIGRAM(S): 5 TABLET ORAL at 21:01

## 2020-01-01 RX ADMIN — Medication 3 TABLET(S): at 14:45

## 2020-01-01 RX ADMIN — SODIUM CHLORIDE 500 MILLILITER(S): 9 INJECTION, SOLUTION INTRAVENOUS at 10:37

## 2020-01-01 RX ADMIN — PREGABALIN 1000 MICROGRAM(S): 225 CAPSULE ORAL at 14:26

## 2020-01-01 RX ADMIN — Medication 650 MILLIGRAM(S): at 15:10

## 2020-01-01 RX ADMIN — PREGABALIN 1000 MICROGRAM(S): 225 CAPSULE ORAL at 12:16

## 2020-01-01 RX ADMIN — MEROPENEM 100 MILLIGRAM(S): 1 INJECTION INTRAVENOUS at 06:14

## 2020-01-01 RX ADMIN — CEFEPIME 100 MILLIGRAM(S): 1 INJECTION, POWDER, FOR SOLUTION INTRAMUSCULAR; INTRAVENOUS at 17:14

## 2020-01-01 RX ADMIN — DEXMEDETOMIDINE HYDROCHLORIDE IN 0.9% SODIUM CHLORIDE 3.77 MICROGRAM(S)/KG/HR: 4 INJECTION INTRAVENOUS at 17:05

## 2020-01-01 RX ADMIN — SODIUM CHLORIDE 75 MILLILITER(S): 9 INJECTION, SOLUTION INTRAVENOUS at 05:34

## 2020-01-01 RX ADMIN — Medication 1 TABLET(S): at 13:43

## 2020-01-01 RX ADMIN — AMLODIPINE BESYLATE 10 MILLIGRAM(S): 2.5 TABLET ORAL at 22:21

## 2020-01-01 RX ADMIN — MONTELUKAST 10 MILLIGRAM(S): 4 TABLET, CHEWABLE ORAL at 22:28

## 2020-01-01 RX ADMIN — OXYCODONE HYDROCHLORIDE 5 MILLIGRAM(S): 5 TABLET ORAL at 19:05

## 2020-01-01 RX ADMIN — Medication 4 UNIT(S): at 02:51

## 2020-01-01 RX ADMIN — OXYCODONE AND ACETAMINOPHEN 1 TABLET(S): 5; 325 TABLET ORAL at 06:43

## 2020-01-01 RX ADMIN — MUPIROCIN 1 APPLICATION(S): 20 OINTMENT TOPICAL at 06:30

## 2020-01-01 RX ADMIN — INSULIN GLARGINE 45 UNIT(S): 100 INJECTION, SOLUTION SUBCUTANEOUS at 22:18

## 2020-01-01 RX ADMIN — MEROPENEM 100 MILLIGRAM(S): 1 INJECTION INTRAVENOUS at 12:31

## 2020-01-01 RX ADMIN — OXYCODONE HYDROCHLORIDE 10 MILLIGRAM(S): 5 TABLET ORAL at 11:00

## 2020-01-01 RX ADMIN — Medication 4: at 06:22

## 2020-01-01 RX ADMIN — CEFEPIME 100 MILLIGRAM(S): 1 INJECTION, POWDER, FOR SOLUTION INTRAMUSCULAR; INTRAVENOUS at 05:11

## 2020-01-01 RX ADMIN — GABAPENTIN 300 MILLIGRAM(S): 400 CAPSULE ORAL at 21:43

## 2020-01-01 RX ADMIN — CALCITRIOL 0.25 MICROGRAM(S): 0.5 CAPSULE ORAL at 16:45

## 2020-01-01 RX ADMIN — Medication 40 MILLIGRAM(S): at 18:27

## 2020-01-01 RX ADMIN — ONDANSETRON 4 MILLIGRAM(S): 8 TABLET, FILM COATED ORAL at 13:50

## 2020-01-01 RX ADMIN — Medication 80 MILLIGRAM(S): at 13:42

## 2020-01-01 RX ADMIN — MEROPENEM 100 MILLIGRAM(S): 1 INJECTION INTRAVENOUS at 05:49

## 2020-01-01 RX ADMIN — MEROPENEM 100 MILLIGRAM(S): 1 INJECTION INTRAVENOUS at 21:33

## 2020-01-01 RX ADMIN — DESMOPRESSIN ACETATE 224 MICROGRAM(S): 0.1 TABLET ORAL at 11:57

## 2020-01-01 RX ADMIN — FENTANYL CITRATE 50 MICROGRAM(S): 50 INJECTION INTRAVENOUS at 13:27

## 2020-01-01 RX ADMIN — Medication 650 MILLIGRAM(S): at 06:08

## 2020-01-01 RX ADMIN — CHLORHEXIDINE GLUCONATE 1 APPLICATION(S): 213 SOLUTION TOPICAL at 05:05

## 2020-01-01 RX ADMIN — Medication 250 MILLIGRAM(S): at 05:11

## 2020-01-01 RX ADMIN — LINEZOLID 600 MILLIGRAM(S): 600 INJECTION, SOLUTION INTRAVENOUS at 17:14

## 2020-01-01 RX ADMIN — TACROLIMUS 0.5 MILLIGRAM(S): 5 CAPSULE ORAL at 05:31

## 2020-01-01 RX ADMIN — SODIUM CHLORIDE 1000 MILLILITER(S): 9 INJECTION INTRAMUSCULAR; INTRAVENOUS; SUBCUTANEOUS at 15:56

## 2020-01-01 RX ADMIN — MUPIROCIN 1 APPLICATION(S): 20 OINTMENT TOPICAL at 06:38

## 2020-01-01 RX ADMIN — PREGABALIN 1000 MICROGRAM(S): 225 CAPSULE ORAL at 13:47

## 2020-01-01 RX ADMIN — Medication 50 GRAM(S): at 12:27

## 2020-01-01 RX ADMIN — Medication 125 MILLIGRAM(S): at 03:51

## 2020-01-01 RX ADMIN — LINEZOLID 300 MILLIGRAM(S): 600 INJECTION, SOLUTION INTRAVENOUS at 05:51

## 2020-01-01 RX ADMIN — Medication 1 TABLET(S): at 13:19

## 2020-01-01 RX ADMIN — Medication 100 MILLIGRAM(S): at 06:20

## 2020-01-01 RX ADMIN — CHLORHEXIDINE GLUCONATE 1 APPLICATION(S): 213 SOLUTION TOPICAL at 05:32

## 2020-01-01 RX ADMIN — Medication 4 PUFF(S): at 14:38

## 2020-01-01 RX ADMIN — Medication 100 MILLIGRAM(S): at 14:55

## 2020-01-01 RX ADMIN — Medication 1 PUFF(S): at 08:48

## 2020-01-01 RX ADMIN — Medication 6.77 MICROGRAM(S)/KG/MIN: at 05:50

## 2020-01-01 RX ADMIN — Medication 2 TABLET(S): at 23:11

## 2020-01-01 RX ADMIN — Medication 1 TABLET(S): at 12:44

## 2020-01-01 RX ADMIN — PREGABALIN 1000 MICROGRAM(S): 225 CAPSULE ORAL at 09:07

## 2020-01-01 RX ADMIN — Medication 3 TABLET(S): at 22:23

## 2020-01-01 RX ADMIN — Medication 1 PUFF(S): at 19:42

## 2020-01-01 RX ADMIN — Medication 100 MILLIGRAM(S): at 18:06

## 2020-01-01 RX ADMIN — ALBUTEROL 4 PUFF(S): 90 AEROSOL, METERED ORAL at 20:54

## 2020-01-01 RX ADMIN — PANTOPRAZOLE SODIUM 40 MILLIGRAM(S): 20 TABLET, DELAYED RELEASE ORAL at 12:46

## 2020-01-01 RX ADMIN — Medication 100 MILLILITER(S): at 04:08

## 2020-01-01 RX ADMIN — Medication 650 MILLIGRAM(S): at 22:29

## 2020-01-01 RX ADMIN — Medication 1 PUFF(S): at 16:26

## 2020-01-01 RX ADMIN — Medication 60 MILLIGRAM(S): at 18:35

## 2020-01-01 RX ADMIN — MONTELUKAST 10 MILLIGRAM(S): 4 TABLET, CHEWABLE ORAL at 23:02

## 2020-01-01 RX ADMIN — CHLORHEXIDINE GLUCONATE 1 APPLICATION(S): 213 SOLUTION TOPICAL at 05:49

## 2020-01-01 RX ADMIN — Medication 250 MILLIGRAM(S): at 17:28

## 2020-01-01 RX ADMIN — CHLORHEXIDINE GLUCONATE 15 MILLILITER(S): 213 SOLUTION TOPICAL at 05:35

## 2020-01-01 RX ADMIN — Medication 1 PUFF(S): at 19:21

## 2020-01-01 RX ADMIN — Medication 2 MILLIGRAM(S): at 12:00

## 2020-01-01 RX ADMIN — OXYCODONE HYDROCHLORIDE 10 MILLIGRAM(S): 5 TABLET ORAL at 17:37

## 2020-01-01 RX ADMIN — Medication 40 MILLIEQUIVALENT(S): at 23:06

## 2020-01-01 RX ADMIN — MUPIROCIN 1 APPLICATION(S): 20 OINTMENT TOPICAL at 18:35

## 2020-01-01 RX ADMIN — ALBUTEROL 4 PUFF(S): 90 AEROSOL, METERED ORAL at 14:04

## 2020-01-01 RX ADMIN — GABAPENTIN 100 MILLIGRAM(S): 400 CAPSULE ORAL at 13:47

## 2020-01-01 RX ADMIN — PREGABALIN 1000 MICROGRAM(S): 225 CAPSULE ORAL at 12:41

## 2020-01-01 RX ADMIN — CHLORHEXIDINE GLUCONATE 1 APPLICATION(S): 213 SOLUTION TOPICAL at 12:33

## 2020-01-01 RX ADMIN — Medication 80 MILLIGRAM(S): at 05:52

## 2020-01-01 RX ADMIN — OXYCODONE HYDROCHLORIDE 10 MILLIGRAM(S): 5 TABLET ORAL at 12:30

## 2020-01-01 RX ADMIN — CHLORHEXIDINE GLUCONATE 1 APPLICATION(S): 213 SOLUTION TOPICAL at 05:54

## 2020-01-01 RX ADMIN — PANTOPRAZOLE SODIUM 40 MILLIGRAM(S): 20 TABLET, DELAYED RELEASE ORAL at 11:58

## 2020-01-01 RX ADMIN — Medication 3 TABLET(S): at 23:12

## 2020-01-01 RX ADMIN — OXYCODONE AND ACETAMINOPHEN 1 TABLET(S): 5; 325 TABLET ORAL at 01:24

## 2020-01-01 RX ADMIN — Medication 3 MILLILITER(S): at 19:50

## 2020-01-01 RX ADMIN — Medication 1 TABLET(S): at 11:40

## 2020-01-01 RX ADMIN — PREGABALIN 1000 MICROGRAM(S): 225 CAPSULE ORAL at 14:24

## 2020-01-01 RX ADMIN — PANTOPRAZOLE SODIUM 40 MILLIGRAM(S): 20 TABLET, DELAYED RELEASE ORAL at 06:07

## 2020-01-01 RX ADMIN — Medication 4 PUFF(S): at 14:18

## 2020-01-01 RX ADMIN — Medication 3 MILLILITER(S): at 09:37

## 2020-01-01 RX ADMIN — GABAPENTIN 300 MILLIGRAM(S): 400 CAPSULE ORAL at 15:13

## 2020-01-01 RX ADMIN — Medication 60 MILLIGRAM(S): at 05:36

## 2020-01-01 RX ADMIN — MUPIROCIN 1 APPLICATION(S): 20 OINTMENT TOPICAL at 17:29

## 2020-01-01 RX ADMIN — Medication 3 TABLET(S): at 05:13

## 2020-01-01 RX ADMIN — MUPIROCIN 1 APPLICATION(S): 20 OINTMENT TOPICAL at 05:05

## 2020-01-01 RX ADMIN — Medication 100 MILLIGRAM(S): at 17:36

## 2020-01-01 RX ADMIN — INSULIN HUMAN 4 UNIT(S)/HR: 100 INJECTION, SOLUTION SUBCUTANEOUS at 07:00

## 2020-01-01 RX ADMIN — Medication 1 PUFF(S): at 14:24

## 2020-01-01 RX ADMIN — CHLORHEXIDINE GLUCONATE 15 MILLILITER(S): 213 SOLUTION TOPICAL at 05:08

## 2020-01-01 RX ADMIN — MONTELUKAST 10 MILLIGRAM(S): 4 TABLET, CHEWABLE ORAL at 21:03

## 2020-01-01 RX ADMIN — MUPIROCIN 1 APPLICATION(S): 20 OINTMENT TOPICAL at 05:34

## 2020-01-01 RX ADMIN — Medication 1000 MILLIGRAM(S): at 06:00

## 2020-01-01 RX ADMIN — Medication 3 MILLILITER(S): at 07:50

## 2020-01-01 RX ADMIN — DEXMEDETOMIDINE HYDROCHLORIDE IN 0.9% SODIUM CHLORIDE 9.03 MICROGRAM(S)/KG/HR: 4 INJECTION INTRAVENOUS at 13:00

## 2020-01-01 RX ADMIN — PANTOPRAZOLE SODIUM 40 MILLIGRAM(S): 20 TABLET, DELAYED RELEASE ORAL at 11:57

## 2020-01-01 RX ADMIN — Medication 50 MILLIGRAM(S): at 05:37

## 2020-01-01 RX ADMIN — PREGABALIN 1000 MICROGRAM(S): 225 CAPSULE ORAL at 11:38

## 2020-01-01 RX ADMIN — Medication 6 UNIT(S): at 17:10

## 2020-01-01 RX ADMIN — PREGABALIN 1000 MICROGRAM(S): 225 CAPSULE ORAL at 17:19

## 2020-01-01 RX ADMIN — TACROLIMUS 0.5 MILLIGRAM(S): 5 CAPSULE ORAL at 18:56

## 2020-01-01 RX ADMIN — Medication 30 MILLIGRAM(S): at 05:15

## 2020-01-01 RX ADMIN — Medication 2: at 06:02

## 2020-01-01 RX ADMIN — Medication 3 TABLET(S): at 15:36

## 2020-01-01 RX ADMIN — INSULIN HUMAN 10 UNIT(S): 100 INJECTION, SOLUTION SUBCUTANEOUS at 14:29

## 2020-01-01 RX ADMIN — ALBUTEROL 4 PUFF(S): 90 AEROSOL, METERED ORAL at 14:27

## 2020-01-01 RX ADMIN — Medication 3 MILLILITER(S): at 09:40

## 2020-01-01 RX ADMIN — ALBUTEROL 4 PUFF(S): 90 AEROSOL, METERED ORAL at 21:07

## 2020-01-01 RX ADMIN — LINEZOLID 600 MILLIGRAM(S): 600 INJECTION, SOLUTION INTRAVENOUS at 17:05

## 2020-01-01 RX ADMIN — Medication 1 PUFF(S): at 02:53

## 2020-01-01 RX ADMIN — CHLORHEXIDINE GLUCONATE 15 MILLILITER(S): 213 SOLUTION TOPICAL at 18:51

## 2020-01-01 RX ADMIN — WARFARIN SODIUM 4 MILLIGRAM(S): 2.5 TABLET ORAL at 21:03

## 2020-01-01 RX ADMIN — CASPOFUNGIN ACETATE 260 MILLIGRAM(S): 7 INJECTION, POWDER, LYOPHILIZED, FOR SOLUTION INTRAVENOUS at 18:53

## 2020-01-01 RX ADMIN — Medication 3 TABLET(S): at 21:46

## 2020-01-01 RX ADMIN — SENNA PLUS 2 TABLET(S): 8.6 TABLET ORAL at 21:09

## 2020-01-01 RX ADMIN — GABAPENTIN 300 MILLIGRAM(S): 400 CAPSULE ORAL at 21:46

## 2020-01-01 RX ADMIN — GABAPENTIN 300 MILLIGRAM(S): 400 CAPSULE ORAL at 21:09

## 2020-01-01 RX ADMIN — SODIUM CHLORIDE 100 MILLILITER(S): 9 INJECTION INTRAMUSCULAR; INTRAVENOUS; SUBCUTANEOUS at 22:28

## 2020-01-01 RX ADMIN — Medication 1 PUFF(S): at 01:47

## 2020-01-01 RX ADMIN — MIDAZOLAM HYDROCHLORIDE 1.51 MG/KG/HR: 1 INJECTION, SOLUTION INTRAMUSCULAR; INTRAVENOUS at 03:51

## 2020-01-01 RX ADMIN — OXYCODONE AND ACETAMINOPHEN 1 TABLET(S): 5; 325 TABLET ORAL at 12:46

## 2020-01-01 RX ADMIN — Medication 3 MILLILITER(S): at 15:01

## 2020-01-01 RX ADMIN — CHLORHEXIDINE GLUCONATE 1 APPLICATION(S): 213 SOLUTION TOPICAL at 05:13

## 2020-01-01 RX ADMIN — DEXMEDETOMIDINE HYDROCHLORIDE IN 0.9% SODIUM CHLORIDE 3.75 MICROGRAM(S)/KG/HR: 4 INJECTION INTRAVENOUS at 10:48

## 2020-01-01 RX ADMIN — MUPIROCIN 1 APPLICATION(S): 20 OINTMENT TOPICAL at 06:21

## 2020-01-01 RX ADMIN — CHLORHEXIDINE GLUCONATE 1 APPLICATION(S): 213 SOLUTION TOPICAL at 05:02

## 2020-01-01 RX ADMIN — LINEZOLID 600 MILLIGRAM(S): 600 INJECTION, SOLUTION INTRAVENOUS at 18:23

## 2020-01-01 RX ADMIN — Medication 2 TABLET(S): at 15:51

## 2020-01-01 RX ADMIN — Medication 3 MILLILITER(S): at 13:11

## 2020-01-01 RX ADMIN — PANTOPRAZOLE SODIUM 40 MILLIGRAM(S): 20 TABLET, DELAYED RELEASE ORAL at 12:11

## 2020-01-01 RX ADMIN — Medication 80 MILLIGRAM(S): at 20:01

## 2020-01-01 RX ADMIN — GABAPENTIN 300 MILLIGRAM(S): 400 CAPSULE ORAL at 06:41

## 2020-01-01 RX ADMIN — Medication 20 MILLIGRAM(S): at 05:22

## 2020-01-01 RX ADMIN — TACROLIMUS 0.5 MILLIGRAM(S): 5 CAPSULE ORAL at 05:08

## 2020-01-01 RX ADMIN — Medication 3 MILLILITER(S): at 14:39

## 2020-01-01 RX ADMIN — PREGABALIN 1000 MICROGRAM(S): 225 CAPSULE ORAL at 12:33

## 2020-01-01 RX ADMIN — Medication 3 TABLET(S): at 15:06

## 2020-01-01 RX ADMIN — Medication 250 MILLIGRAM(S): at 05:14

## 2020-01-01 RX ADMIN — AMLODIPINE BESYLATE 10 MILLIGRAM(S): 2.5 TABLET ORAL at 21:58

## 2020-01-01 RX ADMIN — ALBUTEROL 4 PUFF(S): 90 AEROSOL, METERED ORAL at 14:48

## 2020-01-01 RX ADMIN — Medication 250 MILLIGRAM(S): at 17:25

## 2020-01-01 RX ADMIN — CHLORHEXIDINE GLUCONATE 15 MILLILITER(S): 213 SOLUTION TOPICAL at 17:55

## 2020-01-01 RX ADMIN — CHLORHEXIDINE GLUCONATE 1 APPLICATION(S): 213 SOLUTION TOPICAL at 06:08

## 2020-01-01 RX ADMIN — Medication 1000 MILLIGRAM(S): at 22:28

## 2020-01-01 RX ADMIN — Medication 1 TABLET(S): at 11:03

## 2020-01-01 RX ADMIN — PANTOPRAZOLE SODIUM 40 MILLIGRAM(S): 20 TABLET, DELAYED RELEASE ORAL at 17:06

## 2020-01-01 RX ADMIN — Medication 1 TABLET(S): at 12:32

## 2020-01-01 RX ADMIN — MUPIROCIN 1 APPLICATION(S): 20 OINTMENT TOPICAL at 18:27

## 2020-01-01 RX ADMIN — CASPOFUNGIN ACETATE 260 MILLIGRAM(S): 7 INJECTION, POWDER, LYOPHILIZED, FOR SOLUTION INTRAVENOUS at 09:23

## 2020-01-01 RX ADMIN — MUPIROCIN 1 APPLICATION(S): 20 OINTMENT TOPICAL at 05:00

## 2020-01-01 RX ADMIN — Medication 40 MILLIGRAM(S): at 05:05

## 2020-01-01 RX ADMIN — Medication 4: at 17:43

## 2020-01-01 RX ADMIN — LACTULOSE 10 GRAM(S): 10 SOLUTION ORAL at 17:14

## 2020-01-01 RX ADMIN — CHLORHEXIDINE GLUCONATE 15 MILLILITER(S): 213 SOLUTION TOPICAL at 18:27

## 2020-01-01 RX ADMIN — Medication 40 MILLIGRAM(S): at 05:33

## 2020-01-01 RX ADMIN — OXYCODONE AND ACETAMINOPHEN 1 TABLET(S): 5; 325 TABLET ORAL at 11:54

## 2020-01-01 RX ADMIN — Medication 3 MILLILITER(S): at 13:42

## 2020-01-01 RX ADMIN — CHLORHEXIDINE GLUCONATE 15 MILLILITER(S): 213 SOLUTION TOPICAL at 05:55

## 2020-01-01 RX ADMIN — Medication 6 UNIT(S): at 00:36

## 2020-01-01 RX ADMIN — Medication 20 MILLIGRAM(S): at 05:07

## 2020-01-01 RX ADMIN — LACTULOSE 10 GRAM(S): 10 SOLUTION ORAL at 05:05

## 2020-01-01 RX ADMIN — Medication 2 TABLET(S): at 13:56

## 2020-01-01 RX ADMIN — Medication 1 PUFF(S): at 08:34

## 2020-01-01 RX ADMIN — ALBUTEROL 4 PUFF(S): 90 AEROSOL, METERED ORAL at 19:34

## 2020-01-01 RX ADMIN — LACTULOSE 10 GRAM(S): 10 SOLUTION ORAL at 18:40

## 2020-01-01 RX ADMIN — Medication 10 UNIT(S): at 06:18

## 2020-01-01 RX ADMIN — MUPIROCIN 1 APPLICATION(S): 20 OINTMENT TOPICAL at 05:32

## 2020-01-01 RX ADMIN — MONTELUKAST 10 MILLIGRAM(S): 4 TABLET, CHEWABLE ORAL at 22:35

## 2020-01-01 RX ADMIN — MEROPENEM 100 MILLIGRAM(S): 1 INJECTION INTRAVENOUS at 06:23

## 2020-01-01 RX ADMIN — ALBUTEROL 4 PUFF(S): 90 AEROSOL, METERED ORAL at 08:05

## 2020-01-01 RX ADMIN — CEFEPIME 100 MILLIGRAM(S): 1 INJECTION, POWDER, FOR SOLUTION INTRAMUSCULAR; INTRAVENOUS at 05:05

## 2020-01-01 RX ADMIN — Medication 3: at 08:14

## 2020-01-01 RX ADMIN — CASPOFUNGIN ACETATE 260 MILLIGRAM(S): 7 INJECTION, POWDER, LYOPHILIZED, FOR SOLUTION INTRAVENOUS at 14:04

## 2020-01-01 RX ADMIN — CISATRACURIUM BESYLATE 10 MILLIGRAM(S): 2 INJECTION INTRAVENOUS at 15:12

## 2020-01-01 RX ADMIN — Medication 3 MILLILITER(S): at 19:07

## 2020-01-01 RX ADMIN — CASPOFUNGIN ACETATE 260 MILLIGRAM(S): 7 INJECTION, POWDER, LYOPHILIZED, FOR SOLUTION INTRAVENOUS at 10:53

## 2020-01-01 RX ADMIN — Medication 3 TABLET(S): at 05:23

## 2020-01-01 RX ADMIN — DEXMEDETOMIDINE HYDROCHLORIDE IN 0.9% SODIUM CHLORIDE 3.61 MICROGRAM(S)/KG/HR: 4 INJECTION INTRAVENOUS at 20:00

## 2020-01-01 RX ADMIN — Medication 650 MILLIGRAM(S): at 05:17

## 2020-01-01 RX ADMIN — Medication 650 MILLIGRAM(S): at 23:44

## 2020-01-01 RX ADMIN — Medication 1 TABLET(S): at 14:26

## 2020-01-01 RX ADMIN — MONTELUKAST 10 MILLIGRAM(S): 4 TABLET, CHEWABLE ORAL at 23:31

## 2020-01-01 RX ADMIN — Medication 50 MILLIGRAM(S): at 11:57

## 2020-01-01 RX ADMIN — CHLORHEXIDINE GLUCONATE 15 MILLILITER(S): 213 SOLUTION TOPICAL at 18:57

## 2020-01-01 RX ADMIN — GABAPENTIN 300 MILLIGRAM(S): 400 CAPSULE ORAL at 23:02

## 2020-01-01 RX ADMIN — Medication 2 TABLET(S): at 21:07

## 2020-01-01 RX ADMIN — AMLODIPINE BESYLATE 5 MILLIGRAM(S): 2.5 TABLET ORAL at 05:37

## 2020-01-01 RX ADMIN — OXYCODONE AND ACETAMINOPHEN 1 TABLET(S): 5; 325 TABLET ORAL at 21:00

## 2020-01-01 RX ADMIN — MIDAZOLAM HYDROCHLORIDE 1.51 MG/KG/HR: 1 INJECTION, SOLUTION INTRAMUSCULAR; INTRAVENOUS at 03:49

## 2020-01-01 RX ADMIN — Medication 60 MILLIGRAM(S): at 05:45

## 2020-01-01 RX ADMIN — Medication 60 MILLIGRAM(S): at 18:07

## 2020-01-01 RX ADMIN — Medication 60 MILLIGRAM(S): at 17:15

## 2020-01-01 RX ADMIN — Medication 4 PUFF(S): at 21:05

## 2020-01-01 RX ADMIN — Medication 3 TABLET(S): at 22:37

## 2020-01-01 RX ADMIN — Medication 1000 MILLIGRAM(S): at 21:41

## 2020-01-01 RX ADMIN — LINEZOLID 300 MILLIGRAM(S): 600 INJECTION, SOLUTION INTRAVENOUS at 05:50

## 2020-01-01 RX ADMIN — CHLORHEXIDINE GLUCONATE 15 MILLILITER(S): 213 SOLUTION TOPICAL at 19:27

## 2020-01-01 RX ADMIN — SODIUM CHLORIDE 500 MILLILITER(S): 9 INJECTION INTRAMUSCULAR; INTRAVENOUS; SUBCUTANEOUS at 14:40

## 2020-01-01 RX ADMIN — Medication 3.38 MICROGRAM(S)/KG/MIN: at 20:00

## 2020-01-01 RX ADMIN — MEROPENEM 100 MILLIGRAM(S): 1 INJECTION INTRAVENOUS at 23:01

## 2020-01-01 RX ADMIN — Medication 325 MILLIGRAM(S): at 12:39

## 2020-01-01 RX ADMIN — GABAPENTIN 300 MILLIGRAM(S): 400 CAPSULE ORAL at 21:03

## 2020-01-01 RX ADMIN — CHLORHEXIDINE GLUCONATE 1 APPLICATION(S): 213 SOLUTION TOPICAL at 06:35

## 2020-01-01 RX ADMIN — PANTOPRAZOLE SODIUM 40 MILLIGRAM(S): 20 TABLET, DELAYED RELEASE ORAL at 12:26

## 2020-01-01 RX ADMIN — PANTOPRAZOLE SODIUM 40 MILLIGRAM(S): 20 TABLET, DELAYED RELEASE ORAL at 06:19

## 2020-01-01 RX ADMIN — Medication 75 MILLIGRAM(S): at 05:12

## 2020-01-01 RX ADMIN — Medication 3 TABLET(S): at 05:48

## 2020-01-01 RX ADMIN — FLUCONAZOLE 400 MILLIGRAM(S): 150 TABLET ORAL at 12:41

## 2020-01-01 RX ADMIN — PREGABALIN 1000 MICROGRAM(S): 225 CAPSULE ORAL at 11:40

## 2020-01-01 RX ADMIN — INSULIN HUMAN 1 UNIT(S)/HR: 100 INJECTION, SOLUTION SUBCUTANEOUS at 04:18

## 2020-01-01 RX ADMIN — Medication 3 TABLET(S): at 05:53

## 2020-01-01 RX ADMIN — CALCITRIOL 0.25 MICROGRAM(S): 0.5 CAPSULE ORAL at 18:22

## 2020-01-01 RX ADMIN — Medication 4 PUFF(S): at 14:48

## 2020-01-01 RX ADMIN — Medication 2 TABLET(S): at 22:27

## 2020-01-01 RX ADMIN — CHLORHEXIDINE GLUCONATE 15 MILLILITER(S): 213 SOLUTION TOPICAL at 18:24

## 2020-01-01 RX ADMIN — CISATRACURIUM BESYLATE 10 MILLIGRAM(S): 2 INJECTION INTRAVENOUS at 13:51

## 2020-01-01 RX ADMIN — FENTANYL CITRATE 3.75 MICROGRAM(S)/KG/HR: 50 INJECTION INTRAVENOUS at 17:32

## 2020-01-01 RX ADMIN — Medication 20 MILLIGRAM(S): at 05:21

## 2020-01-01 RX ADMIN — Medication 3 TABLET(S): at 21:21

## 2020-01-01 RX ADMIN — MEROPENEM 100 MILLIGRAM(S): 1 INJECTION INTRAVENOUS at 23:12

## 2020-01-01 RX ADMIN — Medication 650 MILLIGRAM(S): at 18:42

## 2020-01-01 RX ADMIN — MUPIROCIN 1 APPLICATION(S): 20 OINTMENT TOPICAL at 05:41

## 2020-01-01 RX ADMIN — Medication 3.38 MICROGRAM(S)/KG/MIN: at 17:39

## 2020-01-01 RX ADMIN — MUPIROCIN 1 APPLICATION(S): 20 OINTMENT TOPICAL at 18:49

## 2020-01-01 RX ADMIN — Medication 3 TABLET(S): at 21:58

## 2020-01-01 RX ADMIN — PROPOFOL 4.89 MICROGRAM(S)/KG/MIN: 10 INJECTION, EMULSION INTRAVENOUS at 11:58

## 2020-01-01 RX ADMIN — Medication 1000 MILLIGRAM(S): at 06:10

## 2020-01-01 RX ADMIN — CHLORHEXIDINE GLUCONATE 1 APPLICATION(S): 213 SOLUTION TOPICAL at 05:34

## 2020-01-01 RX ADMIN — CEFEPIME 100 MILLIGRAM(S): 1 INJECTION, POWDER, FOR SOLUTION INTRAMUSCULAR; INTRAVENOUS at 05:32

## 2020-01-01 RX ADMIN — ALBUTEROL 4 PUFF(S): 90 AEROSOL, METERED ORAL at 02:27

## 2020-01-01 RX ADMIN — Medication 2 MILLIGRAM(S): at 11:05

## 2020-01-01 RX ADMIN — Medication 650 MILLIGRAM(S): at 06:32

## 2020-01-01 RX ADMIN — PANTOPRAZOLE SODIUM 40 MILLIGRAM(S): 20 TABLET, DELAYED RELEASE ORAL at 11:39

## 2020-01-01 RX ADMIN — FENTANYL CITRATE 3.77 MICROGRAM(S)/KG/HR: 50 INJECTION INTRAVENOUS at 17:14

## 2020-01-01 RX ADMIN — GABAPENTIN 300 MILLIGRAM(S): 400 CAPSULE ORAL at 21:40

## 2020-01-01 NOTE — PROGRESS NOTE ADULT - PROVIDER SPECIALTY LIST ADULT
Infectious Disease
Internal Medicine
Infectious Disease
Infectious Disease
Chelsie Kline  (RN)  2020 23:59:28

## 2020-01-03 PROBLEM — Z79.01 LONG TERM (CURRENT) USE OF ANTICOAGULANTS: Status: ACTIVE | Noted: 2020-01-01

## 2020-01-09 NOTE — ED ADULT TRIAGE NOTE - AS TEMP SITE
[facility]   Pre-operative History and Physical    Patient: Alena Palma  : 1946  Acct#:         HISTORY OF PRESENT ILLNESS:    The patient is a 68 y.o. female  who presents with dysphagia  Past Medical History:        Diagnosis Date    Allergic rhinitis     Asthma     HTN (hypertension)     Hypercholesterolemia      Past Surgical History:        Procedure Laterality Date    COLONOSCOPY      HEMORRHOID SURGERY      OTHER SURGICAL HISTORY      CCX     Medications prior to admission:   Prior to Admission medications    Medication Sig Start Date End Date Taking? Authorizing Provider   pantoprazole (PROTONIX) 20 MG tablet TAKE 1 TABLET BY MOUTH EVERY DAY BEFORE BREAKFAST 20  Yes YIFAN Lua CNP   montelukast (SINGULAIR) 10 MG tablet TAKE 1 TABLET BY MOUTH EVERY DAY AT NIGHT 19  Yes YIFAN Lua CNP   hydrochlorothiazide (HYDRODIURIL) 25 MG tablet TAKE 1 TABLET BY MOUTH EVERY DAY 19  Yes YIFAN Lua CNP   losartan (COZAAR) 100 MG tablet TAKE 1 TABLET BY MOUTH EVERY DAY 19  Yes YIFAN Lua CNP   simvastatin (ZOCOR) 40 MG tablet TAKE 1 TABLET BY MOUTH EVERY DAY AT NIGHT 19  Yes YIFAN Lua CNP   fluticasone (FLONASE) 50 MCG/ACT nasal spray USE 1 SPRAY IN EACH NOSTRIL DAILY 19  Yes Lexi Kidney, MD   Cholecalciferol (VITAMIN D) 2000 units TABS tablet Take 1 tablet by mouth daily 19  Yes YIFAN Lua CNP   aspirin 81 MG tablet Take 81 mg by mouth daily.    Yes Historical Provider, MD   DULERA 100-5 MCG/ACT inhaler INHALE 2 PUFFS ITL TWICE DAILY 17   YIFAN Lua CNP   albuterol sulfate HFA (PROAIR HFA) 108 (90 Base) MCG/ACT inhaler Inhale 2 puffs into the lungs every 4 hours as needed for Wheezing or Shortness of Breath 17   YIFAN Lua CNP     Allergies:    Levofloxacin  Social History:   Social History     Socioeconomic History    Marital limitations    II (soft palate, uvula, fauces visible)  ASSESSMENT AND PLAN:    1. Patient is a 68 y.o. female here for EGD  2. Procedure options, risks and benefits reviewed with patient who expresses understanding. oral

## 2020-01-13 NOTE — ED PROVIDER NOTE - PHYSICAL EXAMINATION
CONSTITUTIONAL: Well-developed; well-nourished; in no acute distress.   SKIN: warm, dry  HEAD: Normocephalic; atraumatic.  EYES: no conj injection  ENT: No nasal discharge; airway clear.  NECK: Supple; non tender.  CARD: S1, S2 normal; no murmurs, gallops, or rubs. Regular rate and rhythm.   RESP: No wheezes, rales or rhonchi. speaking in full sentences, no increased respiratory effort  ABD: soft ntnd  EXT: +b/l LE non-pitting edema; +chronic wound noted over left knee, no signs of infection noted    NEURO: Alert, oriented, grossly unremarkable  PSYCH: Cooperative, appropriate.

## 2020-01-13 NOTE — ED PROVIDER NOTE - NS ED ROS FT
Constitutional: See HPI.  Eyes: No visual changes, eye pain or discharge.  ENMT: No hearing changes, pain, discharge or infections. No neck pain or stiffness.  Cardiac: +SOB; No chest pain, or edema. No chest pain with exertion.  Respiratory: No cough or respiratory distress.   GI: No nausea, vomiting, diarrhea or abdominal pain.  : No dysuria, frequency or burning.  MS: No myalgia, muscle weakness, joint pain or back pain.  Neuro: No headache or weakness. No LOC.  Skin: No skin rash.  Endo: No hx of DM, thyroid disease  Except as documented in HPI, all other review of systems is negative

## 2020-01-13 NOTE — H&P ADULT - NSHPLABSRESULTS_GEN_ALL_CORE
12.3   16.55 )-----------( 307      ( 13 Jan 2020 12:10 )             40.1   01-13    144  |  107  |  22<H>  ----------------------------<  137<H>  4.1   |  23  |  1.0    Ca    9.2      13 Jan 2020 12:10    TPro  6.3  /  Alb  3.8  /  TBili  0.4  /  DBili  x   /  AST  24  /  ALT  33  /  AlkPhos  220<H>  01-13    < from: CT Angio Chest w/ IV Cont (01.13.20 @ 14:32) >    FINDINGS:    PULMONARY EMBOLUS: No pulmonary emboli.    LUNGS, PLEURA, AIRWAYS: Patent central airways. Generalized bilateral groundglass opacity, compatible with interstitial edema. Bilateral calcified pleural plaques are again noted. No pneumothorax. Bibasilar atelectasis. Emphysema.    THORACIC NODES: No intrathoracic lymphadenopathy.     MEDIASTINUM/GREAT VESSELS: No pericardial effusion. Heart size is within normal limits. The aorta and main pulmonary artery are of normal caliber.    BONES/SOFT TISSUES: Age-indeterminate T4 compression fracture new from 7/6/2018. Chronic T10 compression deformity.    VISUALIZED UPPER ABDOMEN: Undulation of the hepatic contour may be due to underlying parenchymal disease.      IMPRESSION:  No pulmonary embolism.  Pulmonary interstitial edema.  Age-indeterminate T4 compression fracture new from 7/6/2018.    < end of copied text >

## 2020-01-13 NOTE — ED PROVIDER NOTE - OBJECTIVE STATEMENT
75 y/o female with pmhx of PE/DVT on coumadin, asthma/COPD, autoimmune hepatitis on prednisone 10mg, LLE chronic wound, HTN presents with worsening SOB. Patient states 6 days ago, she went to urgent care for SOB, was diagnosed with pneumonia and discharged with 40mg prednisone and  doxycycline. Patient admits to being compliant with medications however admits to worsening of symptoms - admits to SOB at rest if she speaks for long periods of time. Denies fevers/chills, cough, URI, n/v/d, abdominal pain, dysuria, worsening leg swelling.

## 2020-01-13 NOTE — H&P ADULT - HISTORY OF PRESENT ILLNESS
74 F PMHx of PE/DVT on coumadin, Asthma/COPD, autoimmune hepatitis on prednisone 10mg, recent admission for LLE chronic wound s/p debridement still has visiting nurse for dressing care, HTN presents with worsening SOB. Patient states 6 days ago, she went to urgent care for SOB, was diagnosed with pneumonia and discharged with 40mg prednisone and  doxycycline. Patient admits to being compliant with medications however admits to worsening of symptoms - admits to SOB at rest if she speaks for long periods of time. Denies fevers/chills, cough, URI, n/v/d, abdominal pain, dysuria, worsening leg swelling.

## 2020-01-13 NOTE — ED ADULT TRIAGE NOTE - CHIEF COMPLAINT QUOTE
"I went to urgent care and I was dianosed with pneumonia. They gave me Prednisone and Doxycycline but my breathing still isn't good and my back hurts."

## 2020-01-13 NOTE — ED PROVIDER NOTE - ATTENDING CONTRIBUTION TO CARE
74yoF with h/o PE, DVT, autoimmune hepatitis on steroids, HTN, COPD, presents with SOB x 4 days, present even at rest, no specific exac/alleviating factors. Associated nonradiating back soreness worse with deep breath. Unimproved despite doxycycline and prednisone started by urgent care. Denies cough, congestion, rhinorrhea, fever, worsening of her b/l LE edema, or any other symptoms. On exam, afebrile, hemodynamically stable, saturating well on RA, NAD, well appearing, no tachypnea or increased WOB, speaking full sentences, head NCAT, EOMI grossly, anicteric, MMM, no JVD, RRR, nml S1/S2, no m/r/g, lungs RLL wheeze, abd soft, NT, ND, nml BS, no rebound or guarding, AAO, CN's 3-12 grossly intact, ARIAS spontaneously, b/l chronic venous stasis, skin warm, dry. Character low suspicion for ACS and ECG/trop unremarkable. Character low suspicion for CHF and no e/o fluid overload on exam. Concern for PE. Noted PNA. Awaiting CTA. Signed off care to Dr. MIKE Metz who will f/u CT. Pt NAD, well appearing at this time.

## 2020-01-13 NOTE — H&P ADULT - ASSESSMENT
73 y/o female with pmhx of asthma, HTN,  autoimmune hepatitis, genetic hypercoagulable disorder (hx of PE and DVT) (on coumadin) present for worsening shortness of breath.     # Cough and worsening Dyspnea   - pt with cough recently started on doxy + prednisone. Uncertain if leukocytosis due steroids vs infection  - pt non toxic appearing Sat 98% on RA. Flu panel neg. Xray read noted as RLL pna, but prior CXR very similar.   - Groundglass opacity on CT, possible underlying ILD? progression of autoimmune disorder?   - given worsening cough and SOB will cover for CAP.      # Leukocytosis due to infection vs recent increase in steroids? repeat cbc in AM  # LLE Wound infection - continue daily dressing. outpatient f/u with Dr. Kang   # HTN -monitor BP  # Autoimmune hepatitis - on pred 10mg,   # Genetic hypercoagulable disorder (DVT and PE in the past) - resume home coumadin 2mg. daily INR.   # Hx of asthma -continue home medications  # GI PPx - (x)Protonix given on prednisone   # DVT PPx - on coumadin   # Activity -  (X) Increase as Tolerated   # Dispo -   Patient to be discharged when medically optimized.  # Code Status - (X) FULL 75 y/o female with pmhx of asthma, HTN,  autoimmune hepatitis, genetic hypercoagulable disorder (hx of PE and DVT) (on coumadin) present for worsening shortness of breath.     # Cough and worsening Dyspnea   - pt with cough recently started on doxy + prednisone. Uncertain if leukocytosis due steroids vs infection  - pt non toxic appearing Sat 98% on RA. Flu panel neg. Xray read noted as RLL pna, but prior CXR very similar.   - Groundglass opacity on CT, possible underlying ILD? progression of autoimmune disorder?   - given worsening cough and SOB will cover for CAP likely preceding viral infection.    - Rocephin / Azithro. Duoneb. Solumedrol.     # Leukocytosis due to infection vs recent increase in steroids? repeat cbc in AM  # LLE Wound infection - continue daily dressing. outpatient f/u with Dr. Kang   # HTN -monitor BP  # Autoimmune hepatitis - on pred 10mg,   # Genetic hypercoagulable disorder (DVT and PE in the past) - resume home coumadin 2mg. daily INR.   # Hx of asthma -continue home medications  # GI PPx - (x)Protonix given on prednisone   # DVT PPx - on coumadin   # Activity -  (X) Increase as Tolerated   # Dispo -   Patient to be discharged when medically optimized.  # Code Status - (X) FULL

## 2020-01-13 NOTE — ED ADULT TRIAGE NOTE - ARRIVAL FROM
[FreeTextEntry1] : Posterior midline anal fissure\par -I recommended conservative therapy initially\par -Metamucil daily\par -Hydrocortisone cream\par -Sitz baths as needed\par -Patient followup in 6 weeks for reevaluation Home

## 2020-01-13 NOTE — H&P ADULT - NSHPPHYSICALEXAM_GEN_ALL_CORE
Vital Signs Last 24 Hrs  T(F): 96.3 (13 Jan 2020 16:20), Max: 97.9 (13 Jan 2020 11:12)  HR: 74 (13 Jan 2020 16:20) (66 - 74)  BP: 161/87 (13 Jan 2020 16:20) (161/87 - 200/94)  RR: 19 (13 Jan 2020 16:20) (19 - 20)  SpO2: 99% (13 Jan 2020 16:20) (98% - 99%)    PHYSICAL EXAM:  GENERAL: The patient is a well-developed, well-nourished in no apparent distress. AOX3.  HEENT: NCAT   NECK: Supple. No lymphadenopathy or thyromegaly.  LUNGS: No respiratory distress or accessory muscle use. CTAB  HEART: RRR, S1 S2 Audible  ABDOMEN: Soft, nontender, and nondistended.  No gaurding or rigidity.  No hepatosplenomegaly was noted.  EXTREMITIES: + edema and ace wrap on LLE wound

## 2020-01-14 NOTE — PATIENT PROFILE ADULT - NSPROHMSYMPCOND_GEN_A_NUR
respiratory/Autoimmune hepatitis  Other chronic pulmonary embolism without acute cor pulmonale  Essential hypertension  Autoimmune hepatitis treated with steroids  Asthma  DVT (deep venous thrombosis)  S/P debridement  History of back surgery/cardiovascular

## 2020-01-14 NOTE — PROGRESS NOTE ADULT - SUBJECTIVE AND OBJECTIVE BOX
DONI PATRICK 74y Female  MRN#: [id number]       SUBJECTIVE  Patient is a 74y old Female who presents with a chief complaint of Shortness of breath (13 Jan 2020 16:56)  Currently admitted to medicine with the primary diagnosis of SOB (shortness of breath)  Hospital course has been complicated by _______.   Today is hospital day 1d, and this morning she is _________ and reports ________ overnight events.     Present Today:           Ohgan Catheter ()No/ ()Yes? Indication:          Central Line ()No/ ()Yes? Indication:          IV Fluids ()No/ ()Yes? Type:  Rate:  Indication:      OBJECTIVE  PAST MEDICAL & SURGICAL HISTORY  Autoimmune hepatitis  Other chronic pulmonary embolism without acute cor pulmonale  Essential hypertension  Autoimmune hepatitis treated with steroids  Asthma  DVT (deep venous thrombosis)  S/P debridement  History of back surgery    ALLERGIES:  No Known Allergies    MEDICATIONS:  STANDING MEDICATIONS  ALBUTerol    90 MICROgram(s) HFA Inhaler 1 Puff(s) Inhalation every 4 hours  albuterol/ipratropium for Nebulization 3 milliLiter(s) Nebulizer every 6 hours  amLODIPine   Tablet 5 milliGRAM(s) Oral daily  azithromycin  IVPB 500 milliGRAM(s) IV Intermittent every 24 hours  azithromycin  IVPB      cefTRIAXone   IVPB 1000 milliGRAM(s) IV Intermittent every 24 hours  chlorhexidine 4% Liquid 1 Application(s) Topical <User Schedule>  ferrous    sulfate 325 milliGRAM(s) Oral daily  furosemide    Tablet 20 milliGRAM(s) Oral daily  gabapentin 300 milliGRAM(s) Oral three times a day  methylPREDNISolone sodium succinate Injectable 40 milliGRAM(s) IV Push every 12 hours  metoprolol tartrate 50 milliGRAM(s) Oral daily  montelukast 10 milliGRAM(s) Oral at bedtime  pantoprazole    Tablet 40 milliGRAM(s) Oral before breakfast  senna 2 Tablet(s) Oral at bedtime  tacrolimus 0.5 milliGRAM(s) Oral every 12 hours  tiotropium 18 MICROgram(s) Capsule 1 Capsule(s) Inhalation daily    PRN MEDICATIONS  oxycodone    5 mG/acetaminophen 325 mG 1 Tablet(s) Oral every 6 hours PRN      VITAL SIGNS: Last 24 Hours  T(C): 36.2 (14 Jan 2020 08:31), Max: 36.6 (13 Jan 2020 11:12)  T(F): 97.1 (14 Jan 2020 08:31), Max: 97.9 (13 Jan 2020 11:12)  HR: 68 (14 Jan 2020 08:31) (66 - 86)  BP: 183/89 (14 Jan 2020 08:31) (140/69 - 200/94)  BP(mean): --  RR: 18 (14 Jan 2020 08:31) (18 - 20)  SpO2: 96% (14 Jan 2020 08:31) (96% - 100%)    LABS:                        12.3   16.55 )-----------( 307      ( 13 Jan 2020 12:10 )             40.1     01-13    144  |  107  |  22<H>  ----------------------------<  137<H>  4.1   |  23  |  1.0    Ca    9.2      13 Jan 2020 12:10    TPro  6.3  /  Alb  3.8  /  TBili  0.4  /  DBili  x   /  AST  24  /  ALT  33  /  AlkPhos  220<H>  01-13    PT/INR - ( 13 Jan 2020 12:10 )   PT: 13.60 sec;   INR: 1.18 ratio         PTT - ( 13 Jan 2020 12:10 )  PTT:23.2 sec      Troponin T, Serum: <0.01 ng/mL (01-13-20 @ 12:10)      CARDIAC MARKERS ( 13 Jan 2020 12:10 )  x     / <0.01 ng/mL / x     / x     / x          RADIOLOGY:      PHYSICAL EXAM:    GENERAL: NAD, well-developed, AAOx3  HEENT:  Atraumatic, Normocephalic. EOMI, PERRLA, conjunctiva and sclera clear, No JVD  PULMONARY: Clear to auscultation bilaterally; No wheeze  CARDIOVASCULAR: Regular rate and rhythm; No murmurs, rubs, or gallops  GASTROINTESTINAL: Soft, Nontender, Nondistended; Bowel sounds present  MUSCULOSKELETAL:  2+ Peripheral Pulses, No clubbing, cyanosis, or edema  NEUROLOGY: non-focal  SKIN: No rashes or lesions      ADMISSION SUMMARY  Patient is a 74y old Female who presents with a chief complaint of Shortness of breath (13 Jan 2020 16:56)  Currently admitted to medicine with the primary diagnosis of SOB (shortness of breath)  Hospital course has been complicated by _______.       ASSESSMENT & PLAN    1. SHORTNESS OF BREATH      2.    3. Autoimmune hepatitis  Other chronic pulmonary embolism without acute cor pulmonale  Essential hypertension  Autoimmune hepatitis treated with steroids  Asthma  DVT (deep venous thrombosis)        Present today:  ( ) Congestive Heart Failure, Yes? ( )Acute / ( )Acute on Chronic / ( )Chronic  :  ( )Systolic / ( )Diastolic               Plan:  ( ) Complicated Pneumonia, Type?  ( )Parapneumonic effusion / ( )Abscess / ( ) Multilobar / ( )Other               Plan:  ( ) Morbid Obesity, Yes? BMI:               Plan:  ( ) Functional Quadriplegia               Plan:  ( ) Encephalopathy               Plan:    ( ) Discussion with patient and/or family regarding goals of care  ( ) Discussed Case and Plan with Medical Attending, Name:      # Planned Disposition: ________ DONI ELDER 74y Female  MRN#: [id number]       SUBJECTIVE  74 female with PMH of PE/DVT on coumadin, Asthma/COPD, autoimmune hepatitis on prednisone 10mg, recent admission for LLE chronic wound s/p debridement, HTN presented with worsening SOB.  Today she is good in spirits, wishes to go home.   OBJECTIVE  PAST MEDICAL & SURGICAL HISTORY  Autoimmune hepatitis  Other chronic pulmonary embolism without acute cor pulmonale  Essential hypertension  Autoimmune hepatitis treated with steroids  Asthma  DVT (deep venous thrombosis)  S/P debridement  History of back surgery    ALLERGIES:  No Known Allergies    MEDICATIONS:  STANDING MEDICATIONS  ALBUTerol    90 MICROgram(s) HFA Inhaler 1 Puff(s) Inhalation every 4 hours  albuterol/ipratropium for Nebulization 3 milliLiter(s) Nebulizer every 6 hours  amLODIPine   Tablet 5 milliGRAM(s) Oral daily  azithromycin  IVPB 500 milliGRAM(s) IV Intermittent every 24 hours  azithromycin  IVPB      cefTRIAXone   IVPB 1000 milliGRAM(s) IV Intermittent every 24 hours  chlorhexidine 4% Liquid 1 Application(s) Topical <User Schedule>  ferrous    sulfate 325 milliGRAM(s) Oral daily  furosemide    Tablet 20 milliGRAM(s) Oral daily  gabapentin 300 milliGRAM(s) Oral three times a day  methylPREDNISolone sodium succinate Injectable 40 milliGRAM(s) IV Push every 12 hours  metoprolol tartrate 50 milliGRAM(s) Oral daily  montelukast 10 milliGRAM(s) Oral at bedtime  pantoprazole    Tablet 40 milliGRAM(s) Oral before breakfast  senna 2 Tablet(s) Oral at bedtime  tacrolimus 0.5 milliGRAM(s) Oral every 12 hours  tiotropium 18 MICROgram(s) Capsule 1 Capsule(s) Inhalation daily    PRN MEDICATIONS  oxycodone    5 mG/acetaminophen 325 mG 1 Tablet(s) Oral every 6 hours PRN      VITAL SIGNS: Last 24 Hours  T(C): 36.2 (14 Jan 2020 08:31), Max: 36.6 (13 Jan 2020 11:12)  T(F): 97.1 (14 Jan 2020 08:31), Max: 97.9 (13 Jan 2020 11:12)  HR: 68 (14 Jan 2020 08:31) (66 - 86)  BP: 183/89 (14 Jan 2020 08:31) (140/69 - 200/94)  BP(mean): --  RR: 18 (14 Jan 2020 08:31) (18 - 20)  SpO2: 96% (14 Jan 2020 08:31) (96% - 100%)    LABS:                        12.3   16.55 )-----------( 307      ( 13 Jan 2020 12:10 )             40.1     01-13    144  |  107  |  22<H>  ----------------------------<  137<H>  4.1   |  23  |  1.0    Ca    9.2      13 Jan 2020 12:10    TPro  6.3  /  Alb  3.8  /  TBili  0.4  /  DBili  x   /  AST  24  /  ALT  33  /  AlkPhos  220<H>  01-13    PT/INR - ( 13 Jan 2020 12:10 )   PT: 13.60 sec;   INR: 1.18 ratio         PTT - ( 13 Jan 2020 12:10 )  PTT:23.2 sec      Troponin T, Serum: <0.01 ng/mL (01-13-20 @ 12:10)      CARDIAC MARKERS ( 13 Jan 2020 12:10 )  x     / <0.01 ng/mL / x     / x     / x          RADIOLOGY:  < from: CT Angio Chest w/ IV Cont (01.13.20 @ 14:32) >    LUNGS, PLEURA, AIRWAYS: Patent central airways. Generalized bilateral groundglass opacity, compatible with interstitial edema. Bilateral calcified pleural plaques are again noted. No pneumothorax. Bibasilar atelectasis. Emphysema.    THORACIC NODES: No intrathoracic lymphadenopathy.     MEDIASTINUM/GREAT VESSELS: No pericardial effusion. Heart size is within normal limits. The aorta and main pulmonary artery are of normal caliber.    BONES/SOFT TISSUES: Age-indeterminate T4 compression fracture new from 7/6/2018. Chronic T10 compression deformity.    VISUALIZED UPPER ABDOMEN: Undulation of the hepatic contour may be due to underlying parenchymal disease.      IMPRESSION:  No pulmonary embolism.  Pulmonary interstitial edema.  Age-indeterminate T4 compression fracture new from 7/6/2018.    < end of copied text >    < from: Xray Chest 1 View-PORTABLE IMMEDIATE (01.13.20 @ 12:07) >  Impression:      Right lower lobe opacity, in appropriate clinical setting would represent infiltrates. Correlate with the clinical findings.    < end of copied text >      PHYSICAL EXAM:  GENERAL: NAD, well-developed, AAOx3, obese  HEENT:  Atraumatic, Normocephalic  PULMONARY: Mild wheezing bilaterally. Crackles.   CARDIOVASCULAR: Regular rate and rhythm  GASTROINTESTINAL: Soft, Nontender  MUSCULOSKELETAL:  2+ Peripheral Pulses  NEUROLOGY: non-focal  SKIN: No rashes or lesions      ADMISSION SUMMARY  Patient is a 74y old Female who presents with a chief complaint of Shortness of breath (13 Jan 2020 16:56)    ASSESSMENT & PLAN    # Cough and worsening Dyspnea   -pt with cough recently was on doxy + prednisone. Uncertain if leukocytosis due steroids vs infection  -More looks like worsening interstitial lung disease.   -pt non toxic appearing Sat 98% on RA. Flu panel neg.  -Ground glass opacity on CT.    -Rocephin / Azithro. Duoneb. Solumedrol for now.     # LLE Wound infection   - continue daily dressing. out patient f/u with Dr. Kang   # HTN   -On norvasc and Lasix    # History of Autoimmune hepatitis   - on pred 10mg outpatient.     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 daily INR.     # DVT prophylaxis  -On Coumadin

## 2020-01-14 NOTE — CONSULT NOTE ADULT - ASSESSMENT
IMPRESSION:    Bilateral interstitial opacities inflammatory vs infectious etiology  Possible DAH  HO autoimmune hepatitis    RECOMMEND:    Switch Antibx to Meropenem and vanco  Nasal MRSA swab; if negative, dc vanco  Prednisone 40mg qd for now  Nebs prn  Hold coumadin  2D echo  HOB >45  OOb to chair IMPRESSION:    Bilateral interstitial opacities inflammatory vs infectious etiology  HO DAH  HO autoimmune hepatitis    RECOMMEND:    Switch ABX to Meropenem and vanco, Levaquin]  Procalcitonin level  Bronchoscopy in am   Urine Legionella and strep Ag  Nasal MRSA swab; if negative, dc vanco  Home Dose of Prednisone                           Nebs prn  Hold coumadin  2D echo  HOB >45  OOb to chair

## 2020-01-14 NOTE — ED ADULT NURSE REASSESSMENT NOTE - NS ED NURSE REASSESS COMMENT FT1
Pt transferred to ED3. No SOB or distress noted. Pt. denies any complaints of pain or discomfort. Report given to RN.

## 2020-01-14 NOTE — PATIENT PROFILE ADULT - ARE SIGNIFICANT INDICATORS COMPLETE.
Chief Complaint   Patient presents with   • Knee       HPI: Knee Pain  Patient complains of left knee pain secondary to unknown injury.. This is evaluated as a unknown injury. The pain began 1 year ago. The knee has not given out or felt unstable. The patient is active in general exercise. Treatment to date has been ice, heat, Tylenol, NSAID's, without significant relief.    Current Outpatient Prescriptions   Medication Sig   • amphetamine-dextroamphetamine (ADDERALL) 20 MG tablet Take 1 tablet by mouth daily.     No current facility-administered medications for this visit.        Vitals:    09/11/17 1638   BP: 120/70   Pulse: 64       PHYSICAL EXAM:   GENERAL:  Patient is a 28 year old male  who presents in NAD. Patient is well developed, well nourished, alert and oriented x 3.  EXT:On exam, the patient walks without a limp and is full weight bearing.  The knee is cool, non-tender and without effusion.  The calf is nontender.  CHEST: CTA bilaterally, no wheeze, no rales  CV: RRR, no murmur  NEURO: CN II-XII grossly intact  LUQ abdomen is a ?cyst, small, ovoid freely movable nontender area  XRAYS:not obtained    A/P:  1. Chronic pain of left knee  Likely strain and will observe, stretches and NSAIDs  May need ortho or PT    2. LUQ mass  Probable cyest and will monitor    F/U with PCP if symptoms seem to persist or do not improve with treatment.  Tylenol PRN pain    Jonathon Sprague DO           Yes

## 2020-01-14 NOTE — CONSULT NOTE ADULT - SUBJECTIVE AND OBJECTIVE BOX
Patient is a 74y old  Female who presents with a chief complaint of Shortness of breath (14 Jan 2020 10:48)      HPI:  74 F PMHx of PE/DVT on coumadin, Asthma/COPD, autoimmune hepatitis on prednisone 10mg, recent admission for LLE chronic wound s/p debridement still has visiting nurse for dressing care, HTN presents with worsening SOB. Patient states 6 days ago, she went to urgent care for SOB, was diagnosed with pneumonia and discharged with 40mg prednisone and  doxycycline. Patient admits to being compliant with medications however admits to worsening of symptoms - admits to SOB at rest if she speaks for long periods of time. Denies fevers/chills, cough, URI, n/v/d, abdominal pain, dysuria, worsening leg swelling. (13 Jan 2020 16:56)      PAST MEDICAL & SURGICAL HISTORY:  Autoimmune hepatitis  Other chronic pulmonary embolism without acute cor pulmonale  Essential hypertension  Autoimmune hepatitis treated with steroids  Asthma  DVT (deep venous thrombosis)  S/P debridement  History of back surgery      SOCIAL HX:   30 pack year former smoker; quit 30 years ago    FAMILY HISTORY:  No pertinent family history in first degree relatives  .  No cardiovascular or pulmonary family history     REVIEW OF SYSTEMS:    CONSTITUTIONAL:   no fever   no chills.  no weight gain   no weight loss    EYES:   no discharge,   no pain  no redness,   no visual changes.    ENT:   Ears: no ear pain and no hearing problems.  Nose: no nasal congestion and no nasal drainage.  Mouth/Throat: no dysphagia,  no hoarseness and no throat pain.  Neck: no lumps, no pain, no stiffness and no swollen glands.     CARDIOVASCULAR:   no chest pain,   no swelling  no palpitations  no syncope    RESPIRATORY:  see HPI    GASTROINTESTINAL:   no abdominal pain,   no constipation,   no diarrhea,   no vomiting.    GENITOURINARY:  no dysuria,   no frequency,   no urgency  no hematuria.    MUSCULOSKELETAL:   no back pain,   no musculoskeletal pain,  no weakness.    SKIN:   no jaundice,   no lesions,   no pruritis,   no rashes.    NEURO:   no loss of consciousness,   no gait abnormality,   no headache,   no sensory deficits,   no weakness.    PSYCHIATRIC:   no known mental health issues  no anxiety  no depression    ALLERGIC/IMMUNOLOGIC:   No active allergic or immunologic issues      Allergies    No Known Allergies      PHYSICAL EXAM  Vital Signs Last 24 Hrs  T(C): 36.2 (14 Jan 2020 08:31), Max: 36.6 (14 Jan 2020 00:45)  T(F): 97.1 (14 Jan 2020 08:31), Max: 97.9 (14 Jan 2020 00:45)  HR: 68 (14 Jan 2020 08:31) (68 - 86)  BP: 183/89 (14 Jan 2020 08:31) (140/69 - 190/84)  BP(mean): --  RR: 18 (14 Jan 2020 08:31) (18 - 19)  SpO2: 96% (14 Jan 2020 08:31) (96% - 100%)     CONSTITUTIONAL:  Well nourished.  NAD    ENT:   Airway patent,   Nasal mucosa clear.  Mouth with normal mucosa.   Throat has no vesicles,  no oropharyngeal exudates and uvula is midline.  No thrush    EYES:   Clear bilaterally,   pupils equal,   round and reactive to light.    CARDIAC:   Normal rate,   regular rhythm.    Heart sounds S1, S2.   normal  cardiac impulse  + edema      CAROTID:   normal systolic impulse  no bruits    RESPIRATORY:   no wheezing no crackles  normal chest expansion  not tachypneic  no use of accessory muscles    GASTROINTESTINAL:  Abdomen soft,   non-tender,   no guarding,   + BS    MUSCULOSKELETAL:   range of motion is not limited,  no muscle or joint tenderness  no clubbing, cyanosis    NEUROLOGICAL:   Alert and oriented   no focal deficits in cranial nerve areas  no motor or sensory deficits.  pertinent DTRs normal    SKIN:   Skin normal color for race,   warm,   dry and intact.   No evidence of rash.    PSYCHIATRIC:   Alert and oriented to person,   place, time/situation.   normal mood and affect.   no apparent risk to self or others.    HEME LYMPH:   no splenomegaly.  No cervical  lymphadenopathy.  no inguinal lymphadenopathy          LABS:                          12.3   16.55 )-----------( 307      ( 13 Jan 2020 12:10 )             40.1                                               01-13    144  |  107  |  22<H>  ----------------------------<  137<H>  4.1   |  23  |  1.0    Ca    9.2      13 Jan 2020 12:10    TPro  6.3  /  Alb  3.8  /  TBili  0.4  /  DBili  x   /  AST  24  /  ALT  33  /  AlkPhos  220<H>  01-13      PT/INR - ( 13 Jan 2020 12:10 )   PT: 13.60 sec;   INR: 1.18 ratio         PTT - ( 13 Jan 2020 12:10 )  PTT:23.2 sec                                           CARDIAC MARKERS ( 13 Jan 2020 12:10 )  x     / <0.01 ng/mL / x     / x     / x                                                LIVER FUNCTIONS - ( 13 Jan 2020 12:10 )  Alb: 3.8 g/dL / Pro: 6.3 g/dL / ALK PHOS: 220 U/L / ALT: 33 U/L / AST: 24 U/L / GGT: x                                                                                                MEDICATIONS  (STANDING):  ALBUTerol    90 MICROgram(s) HFA Inhaler 1 Puff(s) Inhalation every 4 hours  albuterol/ipratropium for Nebulization 3 milliLiter(s) Nebulizer every 6 hours  amLODIPine   Tablet 5 milliGRAM(s) Oral daily  azithromycin  IVPB 500 milliGRAM(s) IV Intermittent every 24 hours  azithromycin  IVPB      cefTRIAXone   IVPB 1000 milliGRAM(s) IV Intermittent every 24 hours  chlorhexidine 4% Liquid 1 Application(s) Topical <User Schedule>  ferrous    sulfate 325 milliGRAM(s) Oral daily  furosemide    Tablet 20 milliGRAM(s) Oral daily  gabapentin 300 milliGRAM(s) Oral three times a day  methylPREDNISolone sodium succinate Injectable 40 milliGRAM(s) IV Push every 12 hours  metoprolol tartrate 50 milliGRAM(s) Oral daily  montelukast 10 milliGRAM(s) Oral at bedtime  pantoprazole    Tablet 40 milliGRAM(s) Oral before breakfast  senna 2 Tablet(s) Oral at bedtime  tacrolimus 0.5 milliGRAM(s) Oral every 12 hours  tiotropium 18 MICROgram(s) Capsule 1 Capsule(s) Inhalation daily    MEDICATIONS  (PRN):  oxycodone    5 mG/acetaminophen 325 mG 1 Tablet(s) Oral every 6 hours PRN Severe Pain (7 - 10)

## 2020-01-15 NOTE — PHYSICAL THERAPY INITIAL EVALUATION ADULT - GENERAL OBSERVATIONS, REHAB EVAL
Pt encountered supine in bed in NAD, (+) IV, c/o back pain(premedicated) 10/10 scale but agreeable to participate with PT. Pt requires Mod A in bed mobility, Min A in transfer mobility, Min A in ambulation 10 ft using RW with unsteady gait secondary LLE weakness/inc back pain.

## 2020-01-15 NOTE — CONSULT NOTE ADULT - SUBJECTIVE AND OBJECTIVE BOX
HISTORY OF PRESENT ILLNESS:     ·  75 y/o female with pmhx of PE/DVT on coumadin, asthma/COPD, autoimmune hepatitis on prednisone 10mg, LLE chronic wound, HTN presents with worsening SOB. Patient states 6 days ago, she went to urgent care for SOB, was diagnosed with pneumonia and discharged with 40mg prednisone and  doxycycline. Patient admits to being compliant with medications however admits to worsening of symptoms - admits to SOB at rest if she speaks for long periods of time. Denies fevers/chills, cough, URI, n/v/d, abdominal pain, dysuria, worsening leg swelling.	      PAST MEDICAL & SURGICAL HISTORY:  Autoimmune hepatitis  Other chronic pulmonary embolism without acute cor pulmonale  Essential hypertension  Autoimmune hepatitis treated with steroids  Asthma  DVT (deep venous thrombosis)  S/P debridement  History of back surgery    FAMILY HISTORY:  No pertinent family history in first degree relatives      SOCIAL HISTORY:  Tobacco Use:  EtOH use:   Substance:    Allergies    No Known Allergies    Intolerances        REVIEW OF SYSTEMS    All other review of systems negative, except as noted in HPI          MEDICATIONS:  Antibiotics:  levoFLOXacin IVPB      levoFLOXacin IVPB 750 milliGRAM(s) IV Intermittent every 24 hours  meropenem  IVPB 1000 milliGRAM(s) IV Intermittent every 8 hours  vancomycin  IVPB 1250 milliGRAM(s) IV Intermittent every 12 hours    Neuro:  gabapentin 300 milliGRAM(s) Oral three times a day  oxycodone    5 mG/acetaminophen 325 mG 1 Tablet(s) Oral every 4 hours PRN    Anticoagulation:    OTHER:  ALBUTerol    90 MICROgram(s) HFA Inhaler 1 Puff(s) Inhalation every 4 hours  albuterol/ipratropium for Nebulization 3 milliLiter(s) Nebulizer every 6 hours  amLODIPine   Tablet 10 milliGRAM(s) Oral at bedtime  chlorhexidine 4% Liquid 1 Application(s) Topical <User Schedule>  furosemide    Tablet 20 milliGRAM(s) Oral daily  metoprolol tartrate 50 milliGRAM(s) Oral daily  montelukast 10 milliGRAM(s) Oral at bedtime  mupirocin 2% Ointment 1 Application(s) Topical two times a day  pantoprazole    Tablet 40 milliGRAM(s) Oral before breakfast  predniSONE   Tablet 10 milliGRAM(s) Oral daily  senna 2 Tablet(s) Oral at bedtime  tacrolimus 0.5 milliGRAM(s) Oral every 12 hours  tiotropium 18 MICROgram(s) Capsule 1 Capsule(s) Inhalation daily    IVF:  ferrous    sulfate 325 milliGRAM(s) Oral daily      Vital Signs Last 24 Hrs  T(C): 36.3 (15 Jose 2020 07:39), Max: 36.7 (14 Jan 2020 15:15)  T(F): 97.3 (15 Jose 2020 07:39), Max: 98.1 (14 Jan 2020 15:15)  HR: 64 (15 Jose 2020 07:39) (64 - 92)  BP: 186/90 (15 Jose 2020 07:39) (145/70 - 186/90)  BP(mean): --  RR: 18 (15 Jose 2020 07:39) (18 - 18)  SpO2: 97% (15 Jose 2020 07:39) (96% - 97%)    PHYSICAL EXAM:      LABS:                        12.8   16.92 )-----------( 347      ( 15 Jose 2020 06:36 )             41.6     01-15    144  |  105  |  20  ----------------------------<  98  3.9   |  26  |  0.8    Ca    9.3      15 Jose 2020 06:36    TPro  6.0  /  Alb  3.7  /  TBili  0.4  /  DBili  x   /  AST  15  /  ALT  29  /  AlkPhos  196<H>  01-15    PT/INR - ( 15 Jose 2020 06:36 )   PT: 18.70 sec;   INR: 1.63 ratio         PTT - ( 15 Jose 2020 06:36 )  PTT:24.8 sec    :      RADIOLOGY & ADDITIONAL STUDIES:    < from: CT Angio Chest w/ IV Cont (01.13.20 @ 14:32) >  IMPRESSION:  No pulmonary embolism.  Pulmonary interstitial edema.  Age-indeterminate T4 compression fracture new from 7/6/2018.    A/p             74 year old female with the above PmHx admitted for SOB          Incidental finding of T4 compression deformity           conservative therapy  PT / pain management HISTORY OF PRESENT ILLNESS:     ·  73 y/o female with pmhx of PE/DVT on coumadin, asthma/COPD, autoimmune hepatitis on prednisone 10mg, LLE chronic wound, HTN presents with worsening SOB. Patient states 6 days ago, she went to urgent care for SOB, was diagnosed with pneumonia and discharged with 40mg prednisone and  doxycycline. Patient admits to being compliant with medications however admits to worsening of symptoms - admits to SOB at rest if she speaks for long periods of time. Denies fevers/chills, cough, URI, n/v/d, abdominal pain, dysuria, worsening leg swelling.	  Pt is a 73 yo female presents with worsening SOB. Neurosurgery was consulted to ondina T 4 compression fx. Patient seen and examined at bedside. Pt reports lower and upper back pain since she fell one month ago. She admits to some weakness to the LLE since her fall. Pt denies numbness/tingling, HA, or recent traumatic falls/injury.    PAST MEDICAL & SURGICAL HISTORY:  Autoimmune hepatitis  Other chronic pulmonary embolism without acute cor pulmonale  Essential hypertension  Autoimmune hepatitis treated with steroids  Asthma  DVT (deep venous thrombosis)  S/P debridement  History of back surgery    FAMILY HISTORY:  No pertinent family history in first degree relatives      SOCIAL HISTORY:  Allergies    No Known Allergies    Intolerances        REVIEW OF SYSTEMS    All other review of systems negative, except as noted in HPI          MEDICATIONS:  Antibiotics:  levoFLOXacin IVPB      levoFLOXacin IVPB 750 milliGRAM(s) IV Intermittent every 24 hours  meropenem  IVPB 1000 milliGRAM(s) IV Intermittent every 8 hours  vancomycin  IVPB 1250 milliGRAM(s) IV Intermittent every 12 hours    Neuro:  gabapentin 300 milliGRAM(s) Oral three times a day  oxycodone    5 mG/acetaminophen 325 mG 1 Tablet(s) Oral every 4 hours PRN    Anticoagulation:    OTHER:  ALBUTerol    90 MICROgram(s) HFA Inhaler 1 Puff(s) Inhalation every 4 hours  albuterol/ipratropium for Nebulization 3 milliLiter(s) Nebulizer every 6 hours  amLODIPine   Tablet 10 milliGRAM(s) Oral at bedtime  chlorhexidine 4% Liquid 1 Application(s) Topical <User Schedule>  furosemide    Tablet 20 milliGRAM(s) Oral daily  metoprolol tartrate 50 milliGRAM(s) Oral daily  montelukast 10 milliGRAM(s) Oral at bedtime  mupirocin 2% Ointment 1 Application(s) Topical two times a day  pantoprazole    Tablet 40 milliGRAM(s) Oral before breakfast  predniSONE   Tablet 10 milliGRAM(s) Oral daily  senna 2 Tablet(s) Oral at bedtime  tacrolimus 0.5 milliGRAM(s) Oral every 12 hours  tiotropium 18 MICROgram(s) Capsule 1 Capsule(s) Inhalation daily    IVF:  ferrous    sulfate 325 milliGRAM(s) Oral daily      Vital Signs Last 24 Hrs  T(C): 36.3 (15 Jose 2020 07:39), Max: 36.7 (14 Jan 2020 15:15)  T(F): 97.3 (15 Jose 2020 07:39), Max: 98.1 (14 Jan 2020 15:15)  HR: 64 (15 Jose 2020 07:39) (64 - 92)  BP: 186/90 (15 Jose 2020 07:39) (145/70 - 186/90)  RR: 18 (15 Jose 2020 07:39) (18 - 18)  SpO2: 97% (15 Jose 2020 07:39) (96% - 97%)    PHYSICAL EXAM:  GEN: NAD, alert & awake, answering questions appropriately, follows commands  HEENT: NC/AT, EOM intact  Neuro: Alert & oriented x3, PERRLA, EOM intact, no pronator drift, finger to nose intact, good motor strength in B/L UE & LE, TTP to upper thoracic region      LABS:                        12.8   16.92 )-----------( 347      ( 15 Jose 2020 06:36 )             41.6     01-15    144  |  105  |  20  ----------------------------<  98  3.9   |  26  |  0.8    Ca    9.3      15 Jose 2020 06:36    TPro  6.0  /  Alb  3.7  /  TBili  0.4  /  DBili  x   /  AST  15  /  ALT  29  /  AlkPhos  196<H>  01-15    PT/INR - ( 15 Jose 2020 06:36 )   PT: 18.70 sec;   INR: 1.63 ratio         PTT - ( 15 Jose 2020 06:36 )  PTT:24.8 sec    :      RADIOLOGY & ADDITIONAL STUDIES:    < from: CT Angio Chest w/ IV Cont (01.13.20 @ 14:32) >  IMPRESSION:  No pulmonary embolism.  Pulmonary interstitial edema.  Age-indeterminate T4 compression fracture new from 7/6/2018.    A/p             74 year old female with the above PmHx admitted for SOB          Incidental finding of T4 compression deformity           conservative therapy  PT / pain management

## 2020-01-15 NOTE — PROGRESS NOTE ADULT - SUBJECTIVE AND OBJECTIVE BOX
Chart reviewed, patient examined. Pertinent results reviewed.  Case discussed with HO; specialist f/u reviewed  HD#2      SUBJECTIVE  74 female with PMH of PE/DVT on coumadin, Asthma/COPD, autoimmune hepatitis on prednisone 10mg, recent admission for LLE chronic wound s/p debridement, HTN presented with worsening SOB.  No overnight events noted. Pending Pulmonary followup for possible bronchoscopy as per their consult yesterday        OBJECTIVE  PAST MEDICAL & SURGICAL HISTORY  Autoimmune hepatitis  Other chronic pulmonary embolism without acute cor pulmonale  Essential hypertension  Autoimmune hepatitis treated with steroids  Asthma  DVT (deep venous thrombosis)  S/P debridement- of deep leg wounds  History of back surgery    ALLERGIES:  No Known Allergies    MEDICATIONS:  STANDING MEDICATIONS  ALBUTerol    90 MICROgram(s) HFA Inhaler 1 Puff(s) Inhalation every 4 hours  albuterol/ipratropium for Nebulization 3 milliLiter(s) Nebulizer every 6 hours  amLODIPine   Tablet 5 milliGRAM(s) Oral daily  chlorhexidine 4% Liquid 1 Application(s) Topical <User Schedule>  ferrous    sulfate 325 milliGRAM(s) Oral daily  furosemide    Tablet 20 milliGRAM(s) Oral daily  gabapentin 300 milliGRAM(s) Oral three times a day  levoFLOXacin IVPB      levoFLOXacin IVPB 750 milliGRAM(s) IV Intermittent every 24 hours  meropenem  IVPB 1000 milliGRAM(s) IV Intermittent every 8 hours  metoprolol tartrate 50 milliGRAM(s) Oral daily  montelukast 10 milliGRAM(s) Oral at bedtime  mupirocin 2% Ointment 1 Application(s) Topical two times a day  pantoprazole    Tablet 40 milliGRAM(s) Oral before breakfast  predniSONE   Tablet 10 milliGRAM(s) Oral daily  senna 2 Tablet(s) Oral at bedtime  tacrolimus 0.5 milliGRAM(s) Oral every 12 hours  tiotropium 18 MICROgram(s) Capsule 1 Capsule(s) Inhalation daily  vancomycin  IVPB 1250 milliGRAM(s) IV Intermittent every 12 hours    PRN MEDICATIONS  oxycodone    5 mG/acetaminophen 325 mG 1 Tablet(s) Oral every 4 hours PRN      VITAL SIGNS: Last 24 Hours  Vital Signs Last 24 Hrs  T(C): 36.3 (15 Jose 2020 07:39), Max: 36.7 (14 Jan 2020 15:15)  T(F): 97.3 (15 Jose 2020 07:39), Max: 98.1 (14 Jan 2020 15:15)  HR: 64 (15 Jose 2020 07:39) (64 - 92)  BP: 186/90 (15 Jose 2020 07:39) (145/70 - 186/90)  BP(mean): --  RR: 18 (15 Jose 2020 07:39) (18 - 18)  SpO2: 97% (15 Jose 2020 07:39) (96% - 97%)      LABS:                        12.8   16.92 )-----------( 347      ( 15 Jose 2020 06:36 )             41.6     01-15    144  |  105  |  20  ----------------------------<  98  3.9   |  26  |  0.8    Ca    9.3      15 Jose 2020 06:36    TPro  6.0  /  Alb  3.7  /  TBili  0.4  /  DBili  x   /  AST  15  /  ALT  29  /  AlkPhos  196<H>  01-15    PT/INR - ( 15 Jose 2020 06:36 )   PT: 18.70 sec;   INR: 1.63 ratio         PTT - ( 15 Jose 2020 06:36 )  PTT:24.8 sec          CARDIAC MARKERS ( 13 Jan 2020 12:10 )  x     / <0.01 ng/mL / x     / x     / x          PHYSICAL EXAM:  GENERAL: NAD, well-developed, AAOx3, obese  HEENT:  Atraumatic, Normocephalic  PULMONARY: Mild wheezing bilaterally. Crackles.   CARDIOVASCULAR: Regular rate and rhythm  GASTROINTESTINAL: Soft, Nontender  MUSCULOSKELETAL:  2+ Peripheral Pulses  NEUROLOGY: non-focal  SKIN: No rashes or lesions    ADMISSION SUMMARY  Patient is a 74y old Female who presents with a chief complaint of Shortness of breath (13 Jan 2020 16:56)    ASSESSMENT & PLAN    # Cough and worsening Dyspnea- improving  -pt with cough recently was on doxy + prednisone. Uncertain if leukocytosis due steroids vs infection  -More looks like worsening interstitial lung disease.   -pt non toxic appearing Sat 98% on RA. Flu panel neg.  -Ground glass opacity on CT.    -On vancomycin, levaquin and meropenem as per pulmonary. MRSA swab positive. Will keep on Mupirocin BID for 5 days. Follow up with vancomycin trough before 4th dose tomorrow.   -follow up with pulmonary.     # LLE Wound infection   - continue daily dressing. out patient f/u with Dr. Kang     # HTN   -On norvasc and Lasix    # History of Autoimmune hepatitis   - on pred 10mg outpatient.     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 daily INR.     # DVT prophylaxis  -On Coumadin Chart reviewed, patient examined. Pertinent results reviewed.  Case discussed with HO; specialist f/u reviewed  HD#2      SUBJECTIVE  74 female with PMH of PE/DVT on coumadin, Asthma/COPD, autoimmune hepatitis on prednisone 10mg, recent admission for LLE chronic wound s/p debridement, HTN presented with worsening SOB. P/S that her posterior CP affects her breathing. She's had the pain since she fell about 4 weeks ago. The pain makes it hard to cough.  No overnight events noted. Pending Pulmonary followup for possible bronchoscopy as per their consult yesterday    Denies GI or  c/o; LLE problems since the fall in December        OBJECTIVE  PAST MEDICAL & SURGICAL HISTORY  Autoimmune hepatitis  Other chronic pulmonary embolism without acute cor pulmonale  Essential hypertension  Autoimmune hepatitis treated with steroids  Asthma  DVT (deep venous thrombosis)  S/P debridement- of deep leg wounds  History of back surgery    ALLERGIES:  No Known Allergies    MEDICATIONS:  STANDING MEDICATIONS  ALBUTerol    90 MICROgram(s) HFA Inhaler 1 Puff(s) Inhalation every 4 hours  albuterol/ipratropium for Nebulization 3 milliLiter(s) Nebulizer every 6 hours  amLODIPine   Tablet 5 milliGRAM(s) Oral daily  chlorhexidine 4% Liquid 1 Application(s) Topical <User Schedule>  ferrous    sulfate 325 milliGRAM(s) Oral daily  furosemide    Tablet 20 milliGRAM(s) Oral daily  gabapentin 300 milliGRAM(s) Oral three times a day  levoFLOXacin IVPB      levoFLOXacin IVPB 750 milliGRAM(s) IV Intermittent every 24 hours  meropenem  IVPB 1000 milliGRAM(s) IV Intermittent every 8 hours  metoprolol tartrate 50 milliGRAM(s) Oral daily  montelukast 10 milliGRAM(s) Oral at bedtime  mupirocin 2% Ointment 1 Application(s) Topical two times a day  pantoprazole    Tablet 40 milliGRAM(s) Oral before breakfast  predniSONE   Tablet 10 milliGRAM(s) Oral daily  senna 2 Tablet(s) Oral at bedtime  tacrolimus 0.5 milliGRAM(s) Oral every 12 hours  tiotropium 18 MICROgram(s) Capsule 1 Capsule(s) Inhalation daily  vancomycin  IVPB 1250 milliGRAM(s) IV Intermittent every 12 hours    PRN MEDICATIONS  oxycodone    5 mG/acetaminophen 325 mG 1 Tablet(s) Oral every 4 hours PRN      VITAL SIGNS: Last 24 Hours  Vital Signs Last 24 Hrs  T(C): 36.3 (15 Jose 2020 07:39), Max: 36.7 (14 Jan 2020 15:15)  T(F): 97.3 (15 Jose 2020 07:39), Max: 98.1 (14 Jan 2020 15:15)  HR: 64 (15 Jose 2020 07:39) (64 - 92)  BP: 186/90 (15 Jose 2020 07:39) (145/70 - 186/90)  BP(mean): --  RR: 18 (15 Jose 2020 07:39) (18 - 18)  SpO2: 97% (15 Jose 2020 07:39) (96% - 97%)      LABS:                        12.8   16.92 )-----------( 347      ( 15 Jose 2020 06:36 )             41.6     01-15    144  |  105  |  20  ----------------------------<  98  3.9   |  26  |  0.8    Ca    9.3      15 Jose 2020 06:36    TPro  6.0  /  Alb  3.7  /  TBili  0.4  /  DBili  x   /  AST  15  /  ALT  29  /  AlkPhos  196<H>  01-15    PT/INR - ( 15 Jose 2020 06:36 )   PT: 18.70 sec;   INR: 1.63 ratio         PTT - ( 15 Jose 2020 06:36 )  PTT:24.8 sec          CARDIAC MARKERS ( 13 Jan 2020 12:10 )  x     / <0.01 ng/mL / x     / x     / x          PHYSICAL EXAM:  GENERAL: NAD, well-developed, AAOx3, obese; in fair spirits; + in moderate pain on sitting up  HEENT:  Atraumatic, Normocephalic  PULMONARY: Min exp wheezing bilaterally. Crackles @ R base  CARDIOVASCULAR: RRR, no MRG  GASTROINTESTINAL: Soft, Nontender  MUSCULOSKELETAL:  2+ Peripheral Pulses; stasis changes -both LE  NEUROLOGY: non-focal- generally weak +1  SKIN: No rashes or lesions    < from: CT Angio Chest w/ IV Cont (01.13.20 @ 14:32) >    COMPARISON: CT dated 7/6/2018.      FINDINGS:    PULMONARY EMBOLUS: No pulmonary emboli.    LUNGS, PLEURA, AIRWAYS: Patent central airways. Generalized bilateral groundglass opacity, compatible with interstitial edema. Bilateral calcified pleural plaques are again noted. No pneumothorax. Bibasilar atelectasis. Emphysema.    THORACIC NODES: No intrathoracic lymphadenopathy.     MEDIASTINUM/GREAT VESSELS: No pericardial effusion. Heart size is within normal limits. The aorta and main pulmonary artery are of normal caliber.    BONES/SOFT TISSUES: Age-indeterminate T4 compression fracture new from 7/6/2018. Chronic T10 compression deformity.    VISUALIZED UPPER ABDOMEN: Undulation of the hepatic contour may be due to underlying parenchymal disease.      IMPRESSION:  No pulmonary embolism.  Pulmonary interstitial edema.  Age-indeterminate T4 compression fracture new from 7/6/2018.          < end of copied text >      ADMISSION SUMMARY  Patient is a 74y old Female who presented with a chief complaint of Shortness of breath (13 Jan 2020 16:56)    ASSESSMENT & PLAN    # Cough and worsening Dyspnea- improving  -pt with cough recently was on doxy + prednisone. Uncertain if leukocytosis due steroids vs infection  -More looks like worsening interstitial lung disease.   - Pain is limiting her inspiration   -pt non toxic appearing Sat 98% on RA. Flu panel neg.  -Ground glass opacity on CT.    -On vancomycin, levaquin and meropenem as per pulmonary. MRSA swab positive. Will keep on Mupirocin BID for 5 days. Follow up with vancomycin trough before 4th dose tomorrow.   -follow up with pulmonary.     #new T4 compression FX  - analgesia- with bowel regimen  - Neurosurgery consult for Dr Rojas- possible Vertebroplasty    # LLE Wound infection   - continue daily dressing. out patient f/u with Dr. Kang     # HTN   -On norvasc and Lasix    # History of Autoimmune hepatitis   - on pred 10mg outpatient.     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 daily INR.     # DVT prophylaxis  -On Coumadin

## 2020-01-15 NOTE — PHARMACOTHERAPY INTERVENTION NOTE - COMMENTS
I spoke with Dr Roman and recommended to order an alternative to Bactroban nasal because it is not availble

## 2020-01-15 NOTE — PHYSICAL THERAPY INITIAL EVALUATION ADULT - PERTINENT HX OF CURRENT PROBLEM, REHAB EVAL
74 F PMHx of PE/DVT on coumadin, Asthma/COPD, autoimmune hepatitis on prednisone 10mg, recent admission for LLE chronic wound s/p debridement still has visiting nurse for dressing care, HTN presents with worsening SOB.

## 2020-01-15 NOTE — CONSULT NOTE ADULT - ATTENDING COMMENTS
As above.  Conservative treatment
Seen and examined with the pulmonary fellow at the bed side.  Impression and plan as outlined above.

## 2020-01-15 NOTE — DISCHARGE NOTE NURSING/CASE MANAGEMENT/SOCIAL WORK - NSDCVIVACCINE_GEN_ALL_CORE_FT
Tdap , 2019/8/12 12:44 , Annie Magdaleno (RN) Influenza , 2020/1/20 15:33 , Sheryl Brewster (RN)  Tdap , 2019/8/12 12:44 , Annie Magdaleno (RN)

## 2020-01-15 NOTE — PROGRESS NOTE ADULT - SUBJECTIVE AND OBJECTIVE BOX
DONI ELDER 74y Female  MRN#: [id number]       SUBJECTIVE  74 female with PMH of PE/DVT on coumadin, Asthma/COPD, autoimmune hepatitis on prednisone 10mg, recent admission for LLE chronic wound s/p debridement, HTN presented with worsening SOB.  No overnight events noted. Pending Pulmonary followup for possible bronchoscopy.       OBJECTIVE  PAST MEDICAL & SURGICAL HISTORY  Autoimmune hepatitis  Other chronic pulmonary embolism without acute cor pulmonale  Essential hypertension  Autoimmune hepatitis treated with steroids  Asthma  DVT (deep venous thrombosis)  S/P debridement  History of back surgery    ALLERGIES:  No Known Allergies    MEDICATIONS:  STANDING MEDICATIONS  ALBUTerol    90 MICROgram(s) HFA Inhaler 1 Puff(s) Inhalation every 4 hours  albuterol/ipratropium for Nebulization 3 milliLiter(s) Nebulizer every 6 hours  amLODIPine   Tablet 5 milliGRAM(s) Oral daily  chlorhexidine 4% Liquid 1 Application(s) Topical <User Schedule>  ferrous    sulfate 325 milliGRAM(s) Oral daily  furosemide    Tablet 20 milliGRAM(s) Oral daily  gabapentin 300 milliGRAM(s) Oral three times a day  levoFLOXacin IVPB      levoFLOXacin IVPB 750 milliGRAM(s) IV Intermittent every 24 hours  meropenem  IVPB 1000 milliGRAM(s) IV Intermittent every 8 hours  metoprolol tartrate 50 milliGRAM(s) Oral daily  montelukast 10 milliGRAM(s) Oral at bedtime  mupirocin 2% Ointment 1 Application(s) Topical two times a day  pantoprazole    Tablet 40 milliGRAM(s) Oral before breakfast  predniSONE   Tablet 10 milliGRAM(s) Oral daily  senna 2 Tablet(s) Oral at bedtime  tacrolimus 0.5 milliGRAM(s) Oral every 12 hours  tiotropium 18 MICROgram(s) Capsule 1 Capsule(s) Inhalation daily  vancomycin  IVPB 1250 milliGRAM(s) IV Intermittent every 12 hours    PRN MEDICATIONS  oxycodone    5 mG/acetaminophen 325 mG 1 Tablet(s) Oral every 4 hours PRN      VITAL SIGNS: Last 24 Hours  T(C): 36.3 (15 Jose 2020 07:39), Max: 36.7 (14 Jan 2020 15:15)  T(F): 97.3 (15 Jose 2020 07:39), Max: 98.1 (14 Jan 2020 15:15)  HR: 64 (15 Jose 2020 07:39) (64 - 92)  BP: 186/90 (15 Jose 2020 07:39) (145/70 - 186/90)  BP(mean): --  RR: 18 (15 Jose 2020 07:39) (18 - 18)  SpO2: 97% (15 Jose 2020 07:39) (96% - 97%)    LABS:                        12.8   16.92 )-----------( 347      ( 15 Jose 2020 06:36 )             41.6     01-15    144  |  105  |  20  ----------------------------<  98  3.9   |  26  |  0.8    Ca    9.3      15 Jose 2020 06:36    TPro  6.0  /  Alb  3.7  /  TBili  0.4  /  DBili  x   /  AST  15  /  ALT  29  /  AlkPhos  196<H>  01-15    PT/INR - ( 15 Jose 2020 06:36 )   PT: 18.70 sec;   INR: 1.63 ratio         PTT - ( 15 Jose 2020 06:36 )  PTT:24.8 sec          CARDIAC MARKERS ( 13 Jan 2020 12:10 )  x     / <0.01 ng/mL / x     / x     / x          PHYSICAL EXAM:  GENERAL: NAD, well-developed, AAOx3, obese  HEENT:  Atraumatic, Normocephalic  PULMONARY: Mild wheezing bilaterally. Crackles.   CARDIOVASCULAR: Regular rate and rhythm  GASTROINTESTINAL: Soft, Nontender  MUSCULOSKELETAL:  2+ Peripheral Pulses  NEUROLOGY: non-focal  SKIN: No rashes or lesions    ADMISSION SUMMARY  Patient is a 74y old Female who presents with a chief complaint of Shortness of breath (13 Jan 2020 16:56)    ASSESSMENT & PLAN    # Cough and worsening Dyspnea- improving  -pt with cough recently was on doxy + prednisone. Uncertain if leukocytosis due steroids vs infection  -More looks like worsening interstitial lung disease.   -pt non toxic appearing Sat 98% on RA. Flu panel neg.  -Ground glass opacity on CT.    -On vancomycin, levaquin and meropenem as per pulmonary. MRSA swab positive. Will keep on Mupirocin BID for 5 days. Follow up with vancomycin trough before 4th dose tomorrow.   -follow up with pulmonary.     # LLE Wound infection   - continue daily dressing. out patient f/u with Dr. Kang     # HTN   -On norvasc and Lasix    # History of Autoimmune hepatitis   - on pred 10mg outpatient.     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 daily INR.     # DVT prophylaxis  -On Coumadin

## 2020-01-16 NOTE — PROGRESS NOTE ADULT - ASSESSMENT
Patient is a 74y old Female who presents with a chief complaint of Shortness of breath.      Cough, Dyspnea on admisssion  - improving clinically  - Ground glass opacity,  interstitial lung disease? on CT   - non toxic appearing, Sat 95% on RA  - Flu panel neg.  - On vancomycin, Levaquin and Meropenem as per pulmonary  - MRSA swab positive. Will keep on Mupirocin BID for 5 days.   - check cytomegalovirus titers  - check Procalcitonin level  - Follow up bronchoscopy BAL results including cell count and cultures  - Urine Legionella and strep Ag    Acute Back pain  -found with acute vs subacute compression fracture  - patient's pain limits movement etc  - neurosurgery eval noted  - ? brace needed for support OOB  - high risk for fx due to chronic steroid use  - rehab evaluation  - ? need to look at lumbar region given pain    LLE Wound infection   - continue daily dressing  - out patient f/u with Dr. Kang     HTN   - On Norvasc and Lasix    Hx of Autoimmune hepatitis   - on Prednisone 10mg as outpatient    Genetic hypercoagulable disorder   - DVT and PE in the past  - On coumadin 2mg  - monitor daily INR.     DVT prophylaxis  -On Coumadin    Patient is agreeable to rehab. Case discussed with nurse, patient and house officer at length.

## 2020-01-16 NOTE — PROGRESS NOTE ADULT - ASSESSMENT
Patient is a 74y old Female who presents with a chief complaint of Shortness of breath.      # Cough and worsening Dyspnea- improving  -pt with cough recently was on doxy + prednisone. Uncertain if leukocytosis due steroids vs infection  -More looks like worsening interstitial lung disease.   -pt non toxic appearing Sat 95% on RA. Flu panel neg.  -Ground glass opacity on CT.    -On vancomycin, levaquin and meropenem as per pulmonary. MRSA swab positive. Will keep on Mupirocin BID for 5 days.   - check cytomegalovirus titers  - check Procalcitonin level  - Follow up bronchoscopy BAL results including cell count and cultures  - Urine Legionella and strep Ag      # LLE Wound infection   - continue daily dressing. out patient f/u with Dr. Kang     # HTN   -On norvasc and Lasix    # History of Autoimmune hepatitis   - on pred 10mg outpatient.     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 daily INR.     # DVT prophylaxis  -On Coumadin    #Pending: Physiatry, clinical improvement

## 2020-01-16 NOTE — PROGRESS NOTE ADULT - SUBJECTIVE AND OBJECTIVE BOX
Patient is a 74y old  Female who presents with a chief complaint of Shortness of breath (15 Jose 2020 12:47)        SUBJECTIVE:  NO events overnight. unchanged symptoms.  now s/p bronchoscopy with BAL    REVIEW OF SYSTEMS:    CONSTITUTIONAL:   no fever   no chills.  no weight gain   no weight loss    EYES:   no discharge,   no pain  no redness,   no visual changes.    ENT:   Ears: no ear pain and no hearing problems.  Nose: no nasal congestion and no nasal drainage.  Mouth/Throat: no dysphagia,  no hoarseness and no throat pain.  Neck: no lumps, no pain, no stiffness and no swollen glands.     CARDIOVASCULAR:   no chest pain,   no swelling  no palpitaions  no syncope    RESPIRATORY:  + SOB,  no wheezing ,  no respiratory difficulty  no sputum production    GASTROINTESTINAL:   no abdominal pain,   no constipation,   no diarrhea,   no vomiting.    GENITOURINARY:  no dysuria,   no frequency,   no urgency  no hematuria.    MUSCULOSKELETAL:   no back pain,   no musculoskeletal pain,  no weakness.    SKIN:   no jaundice,   no lesions,   no pruritis,   no rashes.    NEURO:   no loss of consciousness,   no gait abnormality,   no headache,   no sensory deficits,   no weakness.    PSYCHIATRIC:   no known mental health issues  no anxiety  no depression    ALLERGIC/IMMUNOLOGIC:   No active allergic or immunologic issues      PHYSICAL EXAM  Vital Signs Last 24 Hrs  T(C): 36.6 (16 Jan 2020 06:42), Max: 36.7 (16 Jan 2020 00:47)  T(F): 97.9 (16 Jan 2020 06:42), Max: 98 (16 Jan 2020 00:47)  HR: 63 (16 Jan 2020 08:45) (63 - 95)  BP: 125/76 (16 Jan 2020 08:45) (118/65 - 190/82)  BP(mean): --  RR: 18 (16 Jan 2020 08:45) (18 - 20)  SpO2: 93% (16 Jan 2020 08:24) (90% - 98%)    CONSTITUTIONAL:  Well nourished.  NAD    ENT:   Airway patent,   Nasal mucosa clear.  Mouth with normal mucosa.   Throat has no vesicles,  no oropharyngeal exudates and uvula is midline.  No thrush    EYES:   Clear bilaterally,   pupils equal,   round and reactive to light.    CARDIAC:   Normal rate,   regular rhythm.    Heart sounds S1, S2.   normal  cardiac impulse  no edema    CAROTID:   normal systolic impulse  no bruits    RESPIRATORY:   +faint crackles at bases  normal chest expansion  not tachypneic,  no use of accessory muscles    GASTROINTESTINAL:  Abdomen soft,   non-tender,   no guarding,   + BS      MUSCULOSKELETAL:   range of motion is not limited,  no muscle or joint tenderness  no clubbing, cyanosis    SKIN:   Skin normal color for race,   warm,   dry and intact.   No evidence of rash.    PSYCHIATRIC:   Alert and oriented to person,   place, time/situation.   normal mood and affect.   no apparent risk to self or others.    HEME LYMPH:   no splenomegaly.  No cervical  lymphadenopathy.  no inguinal lymphadenopathy        LABS:                          12.8   16.92 )-----------( 347      ( 15 Jose 2020 06:36 )             41.6                                               01-15    144  |  105  |  20  ----------------------------<  98  3.9   |  26  |  0.8    Ca    9.3      15 Jose 2020 06:36    TPro  6.0  /  Alb  3.7  /  TBili  0.4  /  DBili  x   /  AST  15  /  ALT  29  /  AlkPhos  196<H>  01-15      PT/INR - ( 15 Jose 2020 06:36 )   PT: 18.70 sec;   INR: 1.63 ratio         PTT - ( 15 Jose 2020 06:36 )  PTT:24.8 sec                                                                                     LIVER FUNCTIONS - ( 15 Jose 2020 06:36 )  Alb: 3.7 g/dL / Pro: 6.0 g/dL / ALK PHOS: 196 U/L / ALT: 29 U/L / AST: 15 U/L / GGT: x                                                                                                MEDICATIONS  (STANDING):  ALBUTerol    90 MICROgram(s) HFA Inhaler 1 Puff(s) Inhalation every 4 hours  albuterol/ipratropium for Nebulization 3 milliLiter(s) Nebulizer every 6 hours  amLODIPine   Tablet 10 milliGRAM(s) Oral at bedtime  chlorhexidine 4% Liquid 1 Application(s) Topical <User Schedule>  ferrous    sulfate 325 milliGRAM(s) Oral daily  furosemide    Tablet 20 milliGRAM(s) Oral daily  gabapentin 300 milliGRAM(s) Oral three times a day  levoFLOXacin IVPB      levoFLOXacin IVPB 750 milliGRAM(s) IV Intermittent every 24 hours  meperidine     Injectable 50 milliGRAM(s) IV Push once  meropenem  IVPB 1000 milliGRAM(s) IV Intermittent every 8 hours  metoprolol tartrate 50 milliGRAM(s) Oral daily  midazolam Injectable 3 milliGRAM(s) IV Push once  montelukast 10 milliGRAM(s) Oral at bedtime  mupirocin 2% Ointment 1 Application(s) Topical two times a day  pantoprazole    Tablet 40 milliGRAM(s) Oral before breakfast  predniSONE   Tablet 10 milliGRAM(s) Oral daily  senna 2 Tablet(s) Oral at bedtime  tacrolimus 0.5 milliGRAM(s) Oral every 12 hours  tiotropium 18 MICROgram(s) Capsule 1 Capsule(s) Inhalation daily  vancomycin  IVPB 1250 milliGRAM(s) IV Intermittent every 12 hours    MEDICATIONS  (PRN):  oxycodone    5 mG/acetaminophen 325 mG 1 Tablet(s) Oral every 4 hours PRN Severe Pain (7 - 10) Patient is a 74y old  Female who presents with a chief complaint of Shortness of breath (15 Joes 2020 12:47)        SUBJECTIVE:  NO events overnight. unchanged symptoms.  S/P bronchoscopy with BAL    REVIEW OF SYSTEMS:    CONSTITUTIONAL:   no fever   no chills.  no weight gain   no weight loss    EYES:   no discharge,   no pain  no redness,   no visual changes.    ENT:   Ears: no ear pain and no hearing problems.  Nose: no nasal congestion and no nasal drainage.  Mouth/Throat: no dysphagia,  no hoarseness and no throat pain.  Neck: no lumps, no pain, no stiffness and no swollen glands.     CARDIOVASCULAR:   no chest pain,   no swelling  no palpitaions  no syncope    RESPIRATORY:  + SOB,  no wheezing ,  no respiratory difficulty  no sputum production    GASTROINTESTINAL:   no abdominal pain,   no constipation,   no diarrhea,   no vomiting.    GENITOURINARY:  no dysuria,   no frequency,   no urgency  no hematuria.    MUSCULOSKELETAL:   no back pain,   no musculoskeletal pain,  no weakness.    SKIN:   no jaundice,   no lesions,   no pruritis,   no rashes.    NEURO:   no loss of consciousness,   no gait abnormality,   no headache,   no sensory deficits,   no weakness.    PSYCHIATRIC:   no known mental health issues  no anxiety  no depression    ALLERGIC/IMMUNOLOGIC:   No active allergic or immunologic issues      PHYSICAL EXAM  Vital Signs Last 24 Hrs  T(C): 36.6 (16 Jan 2020 06:42), Max: 36.7 (16 Jan 2020 00:47)  T(F): 97.9 (16 Jan 2020 06:42), Max: 98 (16 Jan 2020 00:47)  HR: 63 (16 Jan 2020 08:45) (63 - 95)  BP: 125/76 (16 Jan 2020 08:45) (118/65 - 190/82)  BP(mean): --  RR: 18 (16 Jan 2020 08:45) (18 - 20)  SpO2: 93% (16 Jan 2020 08:24) (90% - 98%)    CONSTITUTIONAL:  Well nourished.  NAD    ENT:   Airway patent,   Nasal mucosa clear.  Mouth with normal mucosa.   No thrush    EYES:   Clear bilaterally,   pupils equal,   round and reactive to light.    CARDIAC:   Normal rate,   regular rhythm.    Heart sounds S1, S2.   normal  cardiac impulse  no edema    CAROTID:   normal systolic impulse  no bruits    RESPIRATORY:   +faint crackles at bases  normal chest expansion  not tachypneic,  no use of accessory muscles    GASTROINTESTINAL:  Abdomen soft,   non-tender,   no guarding,   + BS      MUSCULOSKELETAL:   range of motion is not limited,  no muscle or joint tenderness  no clubbing, cyanosis    SKIN:   Skin normal color for race,   warm,   dry and intact.   No evidence of rash.    PSYCHIATRIC:   Alert and oriented to person,   place, time/situation.   normal mood and affect.   no apparent risk to self or others.    HEME LYMPH:   no splenomegaly.  No cervical  lymphadenopathy.  no inguinal lymphadenopathy        LABS:                          12.8   16.92 )-----------( 347      ( 15 Jose 2020 06:36 )             41.6                                               01-15    144  |  105  |  20  ----------------------------<  98  3.9   |  26  |  0.8    Ca    9.3      15 Jose 2020 06:36    TPro  6.0  /  Alb  3.7  /  TBili  0.4  /  DBili  x   /  AST  15  /  ALT  29  /  AlkPhos  196<H>  01-15      PT/INR - ( 15 Jose 2020 06:36 )   PT: 18.70 sec;   INR: 1.63 ratio         PTT - ( 15 Jose 2020 06:36 )  PTT:24.8 sec                                                                                     LIVER FUNCTIONS - ( 15 Jose 2020 06:36 )  Alb: 3.7 g/dL / Pro: 6.0 g/dL / ALK PHOS: 196 U/L / ALT: 29 U/L / AST: 15 U/L / GGT: x                                                                                                MEDICATIONS  (STANDING):  ALBUTerol    90 MICROgram(s) HFA Inhaler 1 Puff(s) Inhalation every 4 hours  albuterol/ipratropium for Nebulization 3 milliLiter(s) Nebulizer every 6 hours  amLODIPine   Tablet 10 milliGRAM(s) Oral at bedtime  chlorhexidine 4% Liquid 1 Application(s) Topical <User Schedule>  ferrous    sulfate 325 milliGRAM(s) Oral daily  furosemide    Tablet 20 milliGRAM(s) Oral daily  gabapentin 300 milliGRAM(s) Oral three times a day  levoFLOXacin IVPB      levoFLOXacin IVPB 750 milliGRAM(s) IV Intermittent every 24 hours  meperidine     Injectable 50 milliGRAM(s) IV Push once  meropenem  IVPB 1000 milliGRAM(s) IV Intermittent every 8 hours  metoprolol tartrate 50 milliGRAM(s) Oral daily  midazolam Injectable 3 milliGRAM(s) IV Push once  montelukast 10 milliGRAM(s) Oral at bedtime  mupirocin 2% Ointment 1 Application(s) Topical two times a day  pantoprazole    Tablet 40 milliGRAM(s) Oral before breakfast  predniSONE   Tablet 10 milliGRAM(s) Oral daily  senna 2 Tablet(s) Oral at bedtime  tacrolimus 0.5 milliGRAM(s) Oral every 12 hours  tiotropium 18 MICROgram(s) Capsule 1 Capsule(s) Inhalation daily  vancomycin  IVPB 1250 milliGRAM(s) IV Intermittent every 12 hours    MEDICATIONS  (PRN):  oxycodone    5 mG/acetaminophen 325 mG 1 Tablet(s) Oral every 4 hours PRN Severe Pain (7 - 10)

## 2020-01-16 NOTE — PROGRESS NOTE ADULT - ASSESSMENT
IMPRESSION:    Bilateral interstitial opacities inflammatory vs infectious etiology  HO DAH  HO autoimmune hepatitis    RECOMMEND:    Continue Antibx  Please check Procalcitonin level  Follow up bronchoscopy BAL results including cell count and cultures  Please get Urine Legionella and strep Ag  Home Dose of Prednisone                           Nebs prn  May resume Couamdin  HOB >45  OOb to chair IMPRESSION:    Bilateral interstitial opacities inflammatory vs infectious etiology  HO DAH  HO autoimmune hepatitis    RECOMMEND:    Continue Antibx  Please check cytomegalovirus titers  Please check Procalcitonin level  Follow up bronchoscopy BAL results including cell count and cultures  Please get Urine Legionella and strep Ag  Home Dose of Prednisone                           Nebs prn  May resume Couamdin  HOB >45  OOb to chair IMPRESSION:    Bilateral interstitial opacities inflammatory vs infectious etiology  HO DAH  HO autoimmune hepatitis    RECOMMEND:    Complete Antibx course  Start Prednisone 60mg qd  Please check cytomegalovirus titers                          Nebs prn  May resume Coumadin  HOB >45  OOb to chair  Follow up with Pulm as outpatient for repeat CT chest in 3-4 weeks IMPRESSION:    Bilateral interstitial opacities inflammatory vs infectious etiology  No DAH  HO autoimmune hepatitis    RECOMMEND:    Complete Antibx course  Start Prednisone 60mg qd  Please check cytomegalovirus titers                          Nebs prn  May resume Coumadin  HOB >45  OOb to chair  Follow up with Pulm as outpatient for repeat CT chest in 3-4 weeks

## 2020-01-16 NOTE — CONSULT NOTE ADULT - ASSESSMENT
74 F PMHx of PE/DVT on coumadin, Asthma/COPD, autoimmune hepatitis on prednisone 10mg, recent admission for LLE chronic wound s/p debridement still has visiting nurse for dressing care, HTN presents with worsening SOB pain management consultation of management of pain preferrably without opioids.     Plan:  Would recommend:  Tylenol 1000mg TID standing - would d/c 650mg tylenol  NSAIDs PRN if no contraindication exist - provide GI ppx if doing so  May increase gabapenting to 300mg at night, 100mg in morning and afternoon  Lidoderm 5% Patch to area of thoracic pain and intact skin    Patient does not wish to start opiod medications; however, after discussion with pateint she acknowledges she is unable to take a deep breath because of the pain and is splinting. I explained that she should not avoid medications for the acute phase of her illness as she needs to take deep breaths to help with PNA and prevent worsening of lung pathology.     Recommend Oxycodone 5mg q4h PRN for pain (patient has taken and tolerated in the past); if patient refuses oxycodone, would recommend tramadol 100mg q4h PRN for pain

## 2020-01-16 NOTE — PROGRESS NOTE ADULT - SUBJECTIVE AND OBJECTIVE BOX
ELDER, DONI  74y  Female  HPI:  74 F PMHx of PE/DVT on coumadin, Asthma/COPD, autoimmune hepatitis on prednisone 10mg, recent admission for LLE chronic wound s/p debridement still has visiting nurse for dressing care, HTN presents with worsening SOB. Patient states 6 days ago, she went to urgent care for SOB, was diagnosed with pneumonia and discharged with 40mg prednisone and  doxycycline. Patient admits to being compliant with medications however admits to worsening of symptoms - admits to SOB at rest if she speaks for long periods of time. Denies fevers/chills, cough, URI, n/v/d, abdominal pain, dysuria, worsening leg swelling. (13 Jan 2020 16:56)    MEDICATIONS  (STANDING):  acetaminophen   Tablet .. 650 milliGRAM(s) Oral every 6 hours  ALBUTerol    90 MICROgram(s) HFA Inhaler 1 Puff(s) Inhalation every 4 hours  albuterol/ipratropium for Nebulization 3 milliLiter(s) Nebulizer every 6 hours  amLODIPine   Tablet 10 milliGRAM(s) Oral at bedtime  chlorhexidine 4% Liquid 1 Application(s) Topical <User Schedule>  ferrous    sulfate 325 milliGRAM(s) Oral daily  furosemide    Tablet 20 milliGRAM(s) Oral daily  gabapentin 300 milliGRAM(s) Oral three times a day  levoFLOXacin IVPB      levoFLOXacin IVPB 750 milliGRAM(s) IV Intermittent every 24 hours  meperidine     Injectable 50 milliGRAM(s) IV Push once  meropenem  IVPB 1000 milliGRAM(s) IV Intermittent every 8 hours  metoprolol tartrate 50 milliGRAM(s) Oral daily  midazolam Injectable 3 milliGRAM(s) IV Push once  montelukast 10 milliGRAM(s) Oral at bedtime  mupirocin 2% Ointment 1 Application(s) Topical two times a day  pantoprazole    Tablet 40 milliGRAM(s) Oral before breakfast  predniSONE   Tablet 10 milliGRAM(s) Oral daily  senna 2 Tablet(s) Oral at bedtime  tacrolimus 0.5 milliGRAM(s) Oral every 12 hours  tiotropium 18 MICROgram(s) Capsule 1 Capsule(s) Inhalation daily  vancomycin  IVPB 1000 milliGRAM(s) IV Intermittent every 12 hours    MEDICATIONS  (PRN):  ketorolac   Injectable 15 milliGRAM(s) IV Push every 6 hours PRN Moderate Pain (4 - 6)  oxycodone    5 mG/acetaminophen 325 mG 1 Tablet(s) Oral every 4 hours PRN Severe Pain (7 - 10)    INTERVAL EVENTS: Patient seen earlier today post Bronchoscopy. Patient remains in pain, patient has not been abl to sit in a chair.    T(C): 35.7 (01-16-20 @ 20:42), Max: 36.7 (01-16-20 @ 00:47)  HR: 77 (01-16-20 @ 20:42) (63 - 95)  BP: 171/82 (01-16-20 @ 20:42) (118/65 - 190/82)  RR: 18 (01-16-20 @ 20:42) (18 - 20)  SpO2: 93% (01-16-20 @ 08:24) (90% - 98%)  Wt(kg): --Vital Signs Last 24 Hrs  T(C): 35.7 (16 Jan 2020 20:42), Max: 36.7 (16 Jan 2020 00:47)  T(F): 96.3 (16 Jan 2020 20:42), Max: 98 (16 Jan 2020 00:47)  HR: 77 (16 Jan 2020 20:42) (63 - 95)  BP: 171/82 (16 Jan 2020 20:42) (118/65 - 190/82)  BP(mean): --  RR: 18 (16 Jan 2020 20:42) (18 - 20)  SpO2: 93% (16 Jan 2020 08:24) (90% - 98%)    PHYSICAL EXAM:  GENERAL: NAD  NECK: Supple, No JVD  CHEST/LUNG: Decreased BS, No wheezing  HEART: S1, S2, Regular rate and rhythm  ABDOMEN: Soft, Nontender, Bowel sounds present  EXTREMITIES: Trace edema  SKIN: LLE with ace wrap    LABS:                        12.8   16.92 )-----------( 347      ( 15 Jose 2020 06:36 )             41.6             01-15    144  |  105  |  20  ----------------------------<  98  3.9   |  26  |  0.8    Ca    9.3      15 Jose 2020 06:36    TPro  6.0  /  Alb  3.7  /  TBili  0.4  /  DBili  x   /  AST  15  /  ALT  29  /  AlkPhos  196<H>  01-15    LIVER FUNCTIONS - ( 15 Jose 2020 06:36 )  Alb: 3.7 g/dL / Pro: 6.0 g/dL / ALK PHOS: 196 U/L / ALT: 29 U/L / AST: 15 U/L / GGT: x                   PT/INR - ( 15 Jose 2020 06:36 )   PT: 18.70 sec;   INR: 1.63 ratio       PTT - ( 15 Jose 2020 06:36 )  PTT:24.8 sec      RADIOLOGY & ADDITIONAL TESTS:

## 2020-01-16 NOTE — PROGRESS NOTE ADULT - SUBJECTIVE AND OBJECTIVE BOX
DONI PATRICK  [facility] T1-3A 005 A ([facility] T1-3A)      Patient is a 74y old  Female who presents with a chief complaint of Shortness of breath (16 Jan 2020 09:05)        -PMHx: Autoimmune hepatitis [Active]  Other chronic pulmonary embolism without acute cor pulmonale [Active]  Essential hypertension [Active]  Autoimmune hepatitis treated with steroids [Active]  Asthma [Active]  DVT (deep venous thrombosis) [Active]    -PSHx:S/P debridement  History of back surgery  No significant past surgical history      Interval events:  Patient seen and examined at bedside. No acute events overnight. For bronchoscopy today.     REVIEW OF SYSTEMS:  CONSTITUTIONAL: No fever, weight loss, or fatigue  RESPIRATORY: No cough, wheezing, chills or hemoptysis; No shortness of breath  CARDIOVASCULAR: No chest pain, palpitations, dizziness, or leg swelling  GASTROINTESTINAL: No abdominal or epigastric pain. No nausea, vomiting, or hematemesis; No diarrhea or constipation. No melena or hematochezia.  NEUROLOGICAL: No headaches  LYMPH NODES: No enlarged glands  MUSCULOSKELETAL: No joint pain or swelling; No muscle, back, or extremity pain      T(C): , Max: 36.7 (01-16-20 @ 00:47)  HR: 63 (01-16-20 @ 08:45)  BP: 125/76 (01-16-20 @ 08:45)  RR: 18 (01-16-20 @ 08:45)  SpO2: 93% (01-16-20 @ 08:24)  CAPILLARY BLOOD GLUCOSE          PHYSICAL EXAM:  GENERAL: NAD, well-developed, AAOx3, obese  HEENT:  Atraumatic, Normocephalic  PULMONARY: Mild wheezing bilaterally. Crackles (improved)  CARDIOVASCULAR: Regular rate and rhythm  GASTROINTESTINAL: Soft, Nontender  MUSCULOSKELETAL:  2+ Peripheral Pulses  NEUROLOGY: non-focal  SKIN: No rashes or lesions      LABS:        PT/INR - ( 15 Jose 2020 06:36 )   PT: 18.70 sec;   INR: 1.63 ratio         PTT - ( 15 Jose 2020 06:36 )  PTT:24.8 sec      Microbiology:      RADIOLOGY & ADDITIONAL TESTS:  < from: CT Angio Chest w/ IV Cont (01.13.20 @ 14:32) >  IMPRESSION:  No pulmonary embolism.  Pulmonary interstitial edema.  Age-indeterminate T4 compression fracture new from 7/6/2018.      < end of copied text >        Medications:  ALBUTerol    90 MICROgram(s) HFA Inhaler 1 Puff(s) Inhalation every 4 hours  albuterol/ipratropium for Nebulization 3 milliLiter(s) Nebulizer every 6 hours  amLODIPine   Tablet 10 milliGRAM(s) Oral at bedtime  chlorhexidine 4% Liquid 1 Application(s) Topical <User Schedule>  ferrous    sulfate 325 milliGRAM(s) Oral daily  furosemide    Tablet 20 milliGRAM(s) Oral daily  gabapentin 300 milliGRAM(s) Oral three times a day  levoFLOXacin IVPB      levoFLOXacin IVPB 750 milliGRAM(s) IV Intermittent every 24 hours  meperidine     Injectable 50 milliGRAM(s) IV Push once  meropenem  IVPB 1000 milliGRAM(s) IV Intermittent every 8 hours  metoprolol tartrate 50 milliGRAM(s) Oral daily  midazolam Injectable 3 milliGRAM(s) IV Push once  montelukast 10 milliGRAM(s) Oral at bedtime  mupirocin 2% Ointment 1 Application(s) Topical two times a day  oxycodone    5 mG/acetaminophen 325 mG 1 Tablet(s) Oral every 4 hours PRN  pantoprazole    Tablet 40 milliGRAM(s) Oral before breakfast  predniSONE   Tablet 10 milliGRAM(s) Oral daily  senna 2 Tablet(s) Oral at bedtime  tacrolimus 0.5 milliGRAM(s) Oral every 12 hours  tiotropium 18 MICROgram(s) Capsule 1 Capsule(s) Inhalation daily  vancomycin  IVPB 1250 milliGRAM(s) IV Intermittent every 12 hours

## 2020-01-16 NOTE — CONSULT NOTE ADULT - SUBJECTIVE AND OBJECTIVE BOX
Patient is a 74y old  Female who presents with a chief complaint of Shortness of breath (16 Jan 2020 15:47)    HPI:  74 F PMHx of PE/DVT on coumadin, Asthma/COPD, autoimmune hepatitis on prednisone 10mg, recent admission for LLE chronic wound s/p debridement still has visiting nurse for dressing care, HTN presents with worsening SOB. Patient states 6 days ago, she went to urgent care for SOB, was diagnosed with pneumonia and discharged with 40mg prednisone and  doxycycline. Patient admits to being compliant with medications however admits to worsening of symptoms - admits to SOB at rest if she speaks for long periods of time. Denies fevers/chills, cough, URI, n/v/d, abdominal pain, dysuria, worsening leg swelling. (13 Jan 2020 16:56)      PAST MEDICAL & SURGICAL HISTORY:  Autoimmune hepatitis  Other chronic pulmonary embolism without acute cor pulmonale  Essential hypertension  Autoimmune hepatitis treated with steroids  Asthma  DVT (deep venous thrombosis)  S/P debridement  History of back surgery      Hospital Course: pt found to have mrsa on c isolation, unable to walk    TODAY'S SUBJECTIVE & REVIEW OF SYMPTOMS:     Constitutional weakness   Cardio WNL   Resp SOB   GI WNL  Heme hx of DVT  Endo WNL  Skin venous stasis changes, many varicosities  MSK pain upper back  Neuro WNL  Cognitive WNL  Psych WNL      MEDICATIONS  (STANDING):  acetaminophen   Tablet .. 650 milliGRAM(s) Oral every 6 hours  ALBUTerol    90 MICROgram(s) HFA Inhaler 1 Puff(s) Inhalation every 4 hours  albuterol/ipratropium for Nebulization 3 milliLiter(s) Nebulizer every 6 hours  amLODIPine   Tablet 10 milliGRAM(s) Oral at bedtime  chlorhexidine 4% Liquid 1 Application(s) Topical <User Schedule>  ferrous    sulfate 325 milliGRAM(s) Oral daily  furosemide    Tablet 20 milliGRAM(s) Oral daily  gabapentin 300 milliGRAM(s) Oral three times a day  levoFLOXacin IVPB      levoFLOXacin IVPB 750 milliGRAM(s) IV Intermittent every 24 hours  meperidine     Injectable 50 milliGRAM(s) IV Push once  meropenem  IVPB 1000 milliGRAM(s) IV Intermittent every 8 hours  metoprolol tartrate 50 milliGRAM(s) Oral daily  midazolam Injectable 3 milliGRAM(s) IV Push once  montelukast 10 milliGRAM(s) Oral at bedtime  mupirocin 2% Ointment 1 Application(s) Topical two times a day  pantoprazole    Tablet 40 milliGRAM(s) Oral before breakfast  predniSONE   Tablet 10 milliGRAM(s) Oral daily  senna 2 Tablet(s) Oral at bedtime  tacrolimus 0.5 milliGRAM(s) Oral every 12 hours  tiotropium 18 MICROgram(s) Capsule 1 Capsule(s) Inhalation daily  vancomycin  IVPB 1000 milliGRAM(s) IV Intermittent every 12 hours  warfarin 2 milliGRAM(s) Oral at bedtime    MEDICATIONS  (PRN):  ketorolac   Injectable 15 milliGRAM(s) IV Push every 6 hours PRN Moderate Pain (4 - 6)  oxycodone    5 mG/acetaminophen 325 mG 1 Tablet(s) Oral every 4 hours PRN Severe Pain (7 - 10)      FAMILY HISTORY:  No pertinent family history in first degree relatives      Allergies    No Known Allergies    Intolerances        SOCIAL HISTORY:    [    ] Etoh  [x    ] Smoking-exsmoker  [    ] Substance abuse     Home Environment:  [    ] Home Alone  [ x   ] Lives with Family  [    ] Home Health Aid    Dwelling:  [    ] Apartment  [  x  ] Private House  [    ] Adult Home  [    ] Skilled Nursing Facility      [    ] Short Term  [    ] Long Term  [  x  ] Stairs                           [    ] Elevator     FUNCTIONAL STATUS PTA: (Check all that apply)  Ambulation: [     ]Independent    [    ] Dependent     [    ] Non-Ambulatory  Assistive Device: [    ] SA Cane  [    ]  Q Cane  [ x   ] Walker  [    ]  Wheelchair  ADL : [  x  ] Independent  [    ]  Dependent       Vital Signs Last 24 Hrs  T(C): 35.6 (16 Jan 2020 13:37), Max: 36.7 (16 Jan 2020 00:47)  T(F): 96 (16 Jan 2020 13:37), Max: 98 (16 Jan 2020 00:47)  HR: 71 (16 Jan 2020 13:37) (63 - 95)  BP: 151/85 (16 Jan 2020 13:37) (118/65 - 190/82)  BP(mean): --  RR: 20 (16 Jan 2020 13:37) (18 - 20)  SpO2: 93% (16 Jan 2020 08:24) (90% - 98%)      PHYSICAL EXAM: Alert & Oriented X3  GENERAL: NAD, well-groomed, well-developed  HEAD:  Atraumatic, Normocephalic  EYES: EOMI, PERRLA  NECK: Supple,   CHEST/LUNG: Clear  HEART: Regular   ABDOMEN: Soft, Nontender, Nondistended; Bowel sounds present  EXTREMITIES: many varicose veins, venous stasis changes , ace wrap on LLE   NERVOUS SYSTEM:  Cranial Nerves 2-12 intact [x    ] Abnormal  [    ]  ROM: WFL all extremities [  x  ]  Abnormal [     ]  Motor Strength: WFL all extremities  [ x   ]  Abnormal [    ]      FUNCTIONAL STATUS:  Bed Mobility: [   ]  Independent [    ]  Supervision [  x  ]  Needs Assistance [  ]  N/A  Transfers: [    ]  Independent [    ]  Supervision [  x  ]  Needs Assistance [    ]  N/A    Ambulation:  [    ]  Independent [    ]  Supervision [  x  ]  Needs Assistance [    ]  N/A   ADL:  [    ]   Independent [  x  ] Requires Assistance [    ] N/A       LABS:                        12.8   16.92 )-----------( 347      ( 15 Jose 2020 06:36 )             41.6     01-15    144  |  105  |  20  ----------------------------<  98  3.9   |  26  |  0.8    Ca    9.3      15 Jose 2020 06:36    TPro  6.0  /  Alb  3.7  /  TBili  0.4  /  DBili  x   /  AST  15  /  ALT  29  /  AlkPhos  196<H>  01-15    PT/INR - ( 15 Jose 2020 06:36 )   PT: 18.70 sec;   INR: 1.63 ratio         PTT - ( 15 Jose 2020 06:36 )  PTT:24.8 sec      RADIOLOGY & ADDITIONAL STUDIES:

## 2020-01-16 NOTE — CONSULT NOTE ADULT - ASSESSMENT
IMPRESSION: Rehab of gait disorder, debility, ? TS compression fx    PRECAUTIONS: [    ] Cardiac  [   x ] Respiratory  [    ] Seizures [x    ] Contact Isolation  [    ] Droplet Isolation  [ x   ] Otherfalls    Weight Bearing Status: wbat    RECOMMENDATION:thoracic xray-order placed    Out of Bed to Chair     DVT/Decubiti Prophylaxis    REHAB PLAN:     [  x   ] Bedside P/T 3-5 times a week   [  x   ] Bedside O/T  2-3 times a week   [     ] No Rehab Therapy Indicated   [     ]  Speech Therapy   Conditioning/ROM                                 ADL  Bed Mobility                                            Conditioning/ROM  Transfers                                                  Bed Mobility  Sitting /Standing Balance                      Transfers                                        Gait Training                                            Sitting/Standing Balance  Stair Training [   ]Applicable                 Home equipment Eval                                                                     Splinting  [   ] Only      GOALS:   ADL   [    ]   Independent         Transfers  [    ] Independent            Ambulation  [     ] Independent     [     ] With device                            [   x ]  CG                                               [x    ]  CG                                                    [ x    ] CG                            [    ] Min A                                          [    ] Min A                                                [     ] Min  A          DISCHARGE PLAN:   [  x   ]  Good candidate for Intensive Rehabilitation/Hospital based-4A [facility]-(( if tolerates PT))                                             Will tolerate 3hrs Intensive Rehab Daily  vs                                      [   x   ]  Short Term Rehab in Skilled Nursing Facility                                       [      ]  Home with Outpatient or VN services                                         [      ]  Possible Candidate for Intensive Hospital based Rehab

## 2020-01-16 NOTE — CONSULT NOTE ADULT - SUBJECTIVE AND OBJECTIVE BOX
Chief Complaint:    HPI:  74 F PMHx of PE/DVT on coumadin, Asthma/COPD, autoimmune hepatitis on prednisone 10mg, recent admission for LLE chronic wound s/p debridement still has visiting nurse for dressing care, HTN presents with worsening SOB. Patient states 6 days ago, she went to urgent care for SOB, was diagnosed with pneumonia and discharged with 40mg prednisone and  doxycycline. Patient admits to being compliant with medications however admits to worsening of symptoms - admits to SOB at rest if she speaks for long periods of time. Denies fevers/chills, cough, URI, n/v/d, abdominal pain, dysuria, worsening leg swelling. (13 Jan 2020 16:56)      PAST MEDICAL & SURGICAL HISTORY:  Autoimmune hepatitis  Other chronic pulmonary embolism without acute cor pulmonale  Essential hypertension  Autoimmune hepatitis treated with steroids  Asthma  DVT (deep venous thrombosis)  S/P debridement  History of back surgery      FAMILY HISTORY:  No pertinent family history in first degree relatives      SOCIAL HISTORY:  [ ] Denies Smoking, Alcohol, or Drug Use    Allergies    No Known Allergies    Intolerances        PAIN MEDICATIONS:  acetaminophen   Tablet .. 650 milliGRAM(s) Oral every 6 hours  gabapentin 300 milliGRAM(s) Oral three times a day  ketorolac   Injectable 15 milliGRAM(s) IV Push every 6 hours PRN  meperidine     Injectable 50 milliGRAM(s) IV Push once  midazolam Injectable 3 milliGRAM(s) IV Push once  oxycodone    5 mG/acetaminophen 325 mG 1 Tablet(s) Oral every 4 hours PRN    Heme:  warfarin 2 milliGRAM(s) Oral at bedtime    Antibiotics:  levoFLOXacin IVPB      levoFLOXacin IVPB 750 milliGRAM(s) IV Intermittent every 24 hours  meropenem  IVPB 1000 milliGRAM(s) IV Intermittent every 8 hours  vancomycin  IVPB 1000 milliGRAM(s) IV Intermittent every 12 hours    Cardiovascular:  amLODIPine   Tablet 10 milliGRAM(s) Oral at bedtime  furosemide    Tablet 20 milliGRAM(s) Oral daily  metoprolol tartrate 50 milliGRAM(s) Oral daily    GI:  pantoprazole    Tablet 40 milliGRAM(s) Oral before breakfast  senna 2 Tablet(s) Oral at bedtime    Endocrine:  predniSONE   Tablet 10 milliGRAM(s) Oral daily    All Other Medications:  chlorhexidine 4% Liquid 1 Application(s) Topical <User Schedule>  ferrous    sulfate 325 milliGRAM(s) Oral daily  mupirocin 2% Ointment 1 Application(s) Topical two times a day  tacrolimus 0.5 milliGRAM(s) Oral every 12 hours      REVIEW OF SYSTEMS:    CONSTITUTIONAL: No fever, weight loss, or fatigue  EYES: No eye pain, visual disturbances, or discharge  ENMT:  No difficulty hearing, tinnitus, vertigo; No sinus or throat pain  NECK: No pain or stiffness  BREASTS: No pain, masses, or nipple discharge  RESPIRATORY: No cough, wheezing, chills or hemoptysis; No shortness of breath  CARDIOVASCULAR: No chest pain, palpitations, dizziness, or leg swelling  GASTROINTESTINAL: No abdominal or epigastric pain. No nausea, vomiting, or hematemesis; No diarrhea or constipation. No melena or hematochezia.  GENITOURINARY: No dysuria, frequency, hematuria, or incontinence  NEUROLOGICAL: No headaches, memory loss, loss of strength, numbness, or tremors  SKIN: No itching, burning, rashes, or lesions   LYMPH NODES: No enlarged glands  ENDOCRINE: No heat or cold intolerance; No hair loss  MUSCULOSKELETAL: No joint pain or swelling; No muscle, back, or extremity pain  PSYCHIATRIC: No depression, anxiety, mood swings, or difficulty sleeping  HEME/LYMPH: No easy bruising, or bleeding gums  ALLERY AND IMMUNOLOGIC: No hives or eczema      Vital Signs Last 24 Hrs  T(C): 35.6 (16 Jan 2020 13:37), Max: 36.7 (16 Jan 2020 00:47)  T(F): 96 (16 Jan 2020 13:37), Max: 98 (16 Jan 2020 00:47)  HR: 71 (16 Jan 2020 13:37) (63 - 95)  BP: 151/85 (16 Jan 2020 13:37) (118/65 - 190/82)  BP(mean): --  RR: 20 (16 Jan 2020 13:37) (18 - 20)  SpO2: 93% (16 Jan 2020 08:24) (90% - 98%)    PAIN SCORE:         SCALE USED: (1-10 VNRS)             PHYSICAL EXAM:    GENERAL: NAD, well-groomed, well-developed  HEAD:  Atraumatic, Normocephalic  EYES: EOMI, PERRLA, conjunctiva and sclera clear  ENMT: No tonsillar erythema, exudates, or enlargement; Moist mucous membranes, Good dentition, No lesions  NECK: Supple, No JVD, Normal thyroid  NERVOUS SYSTEM:  Alert & Oriented X3, Good concentration; Motor Strength 5/5 B/L upper and lower extremities; DTRs 2+ intact and symmetric  CHEST/LUNG: Clear to percussion bilaterally; No rales, rhonchi, wheezing, or rubs  HEART: Regular rate and rhythm; No murmurs, rubs, or gallops  ABDOMEN: Soft, Nontender, Nondistended; Bowel sounds present  EXTREMITIES:  2+ Peripheral Pulses, No clubbing, cyanosis, or edema  LYMPH: No lymphadenopathy noted  SKIN: No rashes or lesions        LABS:                          12.8   16.92 )-----------( 347      ( 15 Jose 2020 06:36 )             41.6     01-15    144  |  105  |  20  ----------------------------<  98  3.9   |  26  |  0.8    Ca    9.3      15 Jose 2020 06:36    TPro  6.0  /  Alb  3.7  /  TBili  0.4  /  DBili  x   /  AST  15  /  ALT  29  /  AlkPhos  196<H>  01-15    PT/INR - ( 15 Jose 2020 06:36 )   PT: 18.70 sec;   INR: 1.63 ratio         PTT - ( 15 Jose 2020 06:36 )  PTT:24.8 sec      RADIOLOGY:  < from: CT Angio Chest w/ IV Cont (01.13.20 @ 14:32) >    EXAM:  CT ANGIO CHEST (W)AW IC            PROCEDURE DATE:  01/13/2020            INTERPRETATION:  Clinical History / Reason for exam:     CLINICAL HISTORY/REASON FOR EXAM: Shortness of breath. Evaluation for pulmonary embolism..    TECHNIQUE: Multislice helical sections were obtained from the thoracic inlet to the lung bases during rapid administration of intravenous contrast. Thin sections were reconstructed through the pulmonary vasculature. 3D (MIP) reformats obtained.      COMPARISON: CT dated 7/6/2018.      FINDINGS:    PULMONARY EMBOLUS: No pulmonary emboli.    LUNGS, PLEURA, AIRWAYS: Patent central airways. Generalized bilateral groundglass opacity, compatible with interstitial edema. Bilateral calcified pleural plaques are again noted. No pneumothorax. Bibasilar atelectasis. Emphysema.    THORACIC NODES: No intrathoracic lymphadenopathy.     MEDIASTINUM/GREAT VESSELS: No pericardial effusion. Heart size is within normal limits. The aorta and main pulmonary artery are of normal caliber.    BONES/SOFT TISSUES: Age-indeterminate T4 compression fracture new from 7/6/2018. Chronic T10 compression deformity.    VISUALIZED UPPER ABDOMEN: Undulation of the hepatic contour may be due to underlying parenchymal disease.      IMPRESSION:  No pulmonary embolism.  Pulmonary interstitial edema.  Age-indeterminate T4 compression fracture new from 7/6/2018.                      MARTHA JAY M.D., ATTENDING RADIOLOGIST  This document has been electronically signed. Jan 13 2020  3:30PM        < end of copied text >    Drug Screen:            [ ]  [place]  Reviewed and Copied to Chart Chief Complaint: SOB    HPI:  74 F PMHx of PE/DVT on coumadin, Asthma/COPD, autoimmune hepatitis on prednisone 10mg, recent admission for LLE chronic wound s/p debridement still has visiting nurse for dressing care, HTN presents with worsening SOB. Patient states 6 days ago, she went to urgent care for SOB, was diagnosed with pneumonia and discharged with 40mg prednisone and  doxycycline. Patient admits to being compliant with medications however admits to worsening of symptoms - admits to SOB at rest if she speaks for long periods of time. Denies fevers/chills, cough, URI, n/v/d, abdominal pain, dysuria, worsening leg swelling. patient also complaining of high thoracic pain, found to ahve T4 thoracic compression fracture.       PAST MEDICAL & SURGICAL HISTORY:  Autoimmune hepatitis  Other chronic pulmonary embolism without acute cor pulmonale  Essential hypertension  Autoimmune hepatitis treated with steroids  Asthma  DVT (deep venous thrombosis)  S/P debridement  History of back surgery      FAMILY HISTORY:  No pertinent family history in first degree relatives      SOCIAL HISTORY:  [ x] Denies Smoking, Alcohol, or Drug Use    Allergies    No Known Allergies    Intolerances        PAIN MEDICATIONS:  acetaminophen   Tablet .. 650 milliGRAM(s) Oral every 6 hours  gabapentin 300 milliGRAM(s) Oral three times a day  ketorolac   Injectable 15 milliGRAM(s) IV Push every 6 hours PRN  meperidine     Injectable 50 milliGRAM(s) IV Push once  midazolam Injectable 3 milliGRAM(s) IV Push once  oxycodone    5 mG/acetaminophen 325 mG 1 Tablet(s) Oral every 4 hours PRN    Heme:  warfarin 2 milliGRAM(s) Oral at bedtime    Antibiotics:  levoFLOXacin IVPB      levoFLOXacin IVPB 750 milliGRAM(s) IV Intermittent every 24 hours  meropenem  IVPB 1000 milliGRAM(s) IV Intermittent every 8 hours  vancomycin  IVPB 1000 milliGRAM(s) IV Intermittent every 12 hours    Cardiovascular:  amLODIPine   Tablet 10 milliGRAM(s) Oral at bedtime  furosemide    Tablet 20 milliGRAM(s) Oral daily  metoprolol tartrate 50 milliGRAM(s) Oral daily    GI:  pantoprazole    Tablet 40 milliGRAM(s) Oral before breakfast  senna 2 Tablet(s) Oral at bedtime    Endocrine:  predniSONE   Tablet 10 milliGRAM(s) Oral daily    All Other Medications:  chlorhexidine 4% Liquid 1 Application(s) Topical <User Schedule>  ferrous    sulfate 325 milliGRAM(s) Oral daily  mupirocin 2% Ointment 1 Application(s) Topical two times a day  tacrolimus 0.5 milliGRAM(s) Oral every 12 hours      REVIEW OF SYSTEMS:    CONSTITUTIONAL: No fever, weight loss, or fatigue  EYES: No eye pain, visual disturbances, or discharge  ENMT:  No difficulty hearing, tinnitus, vertigo; No sinus or throat pain  NECK: No pain or stiffness  BREASTS: No pain, masses, or nipple discharge  RESPIRATORY: No cough, wheezing, chills or hemoptysis; + SOB  CARDIOVASCULAR: No chest pain, palpitations, dizziness, or leg swelling  GASTROINTESTINAL: No abdominal or epigastric pain. No nausea, vomiting, or hematemesis; No diarrhea or constipation. No melena or hematochezia.  GENITOURINARY: No dysuria, frequency, hematuria, or incontinence  NEUROLOGICAL: No headaches, memory loss, loss of strength, numbness, or tremors  SKIN: No itching, burning, rashes, or lesions   LYMPH NODES: No enlarged glands  ENDOCRINE: No heat or cold intolerance; No hair loss  MUSCULOSKELETAL: Pain in high thorax, LLE wrapped with bandages c/d/i  PSYCHIATRIC: No depression, anxiety, mood swings, or difficulty sleeping  HEME/LYMPH: No easy bruising, or bleeding gums  ALLERY AND IMMUNOLOGIC: No hives or eczema      Vital Signs Last 24 Hrs  T(C): 35.6 (16 Jan 2020 13:37), Max: 36.7 (16 Jan 2020 00:47)  T(F): 96 (16 Jan 2020 13:37), Max: 98 (16 Jan 2020 00:47)  HR: 71 (16 Jan 2020 13:37) (63 - 95)  BP: 151/85 (16 Jan 2020 13:37) (118/65 - 190/82)  RR: 20 (16 Jan 2020 13:37) (18 - 20)  SpO2: 93% (16 Jan 2020 08:24) (90% - 98%)    PAIN SCORE:    5     SCALE USED: (1-10 VNRS)             PHYSICAL EXAM:    GENERAL: NAD, well-groomed, well-developed  HEAD:  Atraumatic, Normocephalic  EYES: EOMI, PERRLA, conjunctiva and sclera clear  ENMT: No tonsillar erythema, exudates, or enlargement; Moist mucous membranes, Good dentition, No lesions  NECK: Supple, No JVD, Normal thyroid  NERVOUS SYSTEM:  Alert & Oriented X3, Good concentration; Motor Strength 5/5 B/L upper and lower extremities; DTRs 2+ intact and symmetric, + pain with SLR on L  CHEST/LUNG: Clear to percussion bilaterally; No rales, rhonchi, wheezing, or rubs. pain with palpation of upper thoracic spine  HEART: Regular rate and rhythm; No murmurs, rubs, or gallops  ABDOMEN: Soft, Nontender, Nondistended; Bowel sounds present  EXTREMITIES:  2+ Peripheral Pulses, LLE wrapped with ace bandages  LYMPH: No lymphadenopathy noted  SKIN: No rashes or lesions        LABS:                          12.8   16.92 )-----------( 347      ( 15 Jose 2020 06:36 )             41.6     01-15    144  |  105  |  20  ----------------------------<  98  3.9   |  26  |  0.8    Ca    9.3      15 Jose 2020 06:36    TPro  6.0  /  Alb  3.7  /  TBili  0.4  /  DBili  x   /  AST  15  /  ALT  29  /  AlkPhos  196<H>  01-15    PT/INR - ( 15 Jose 2020 06:36 )   PT: 18.70 sec;   INR: 1.63 ratio         PTT - ( 15 Jose 2020 06:36 )  PTT:24.8 sec      RADIOLOGY:  < from: CT Angio Chest w/ IV Cont (01.13.20 @ 14:32) >    EXAM:  CT ANGIO CHEST (W)AW IC            PROCEDURE DATE:  01/13/2020            INTERPRETATION:  Clinical History / Reason for exam:     CLINICAL HISTORY/REASON FOR EXAM: Shortness of breath. Evaluation for pulmonary embolism..    TECHNIQUE: Multislice helical sections were obtained from the thoracic inlet to the lung bases during rapid administration of intravenous contrast. Thin sections were reconstructed through the pulmonary vasculature. 3D (MIP) reformats obtained.      COMPARISON: CT dated 7/6/2018.      FINDINGS:    PULMONARY EMBOLUS: No pulmonary emboli.    LUNGS, PLEURA, AIRWAYS: Patent central airways. Generalized bilateral groundglass opacity, compatible with interstitial edema. Bilateral calcified pleural plaques are again noted. No pneumothorax. Bibasilar atelectasis. Emphysema.    THORACIC NODES: No intrathoracic lymphadenopathy.     MEDIASTINUM/GREAT VESSELS: No pericardial effusion. Heart size is within normal limits. The aorta and main pulmonary artery are of normal caliber.    BONES/SOFT TISSUES: Age-indeterminate T4 compression fracture new from 7/6/2018. Chronic T10 compression deformity.    VISUALIZED UPPER ABDOMEN: Undulation of the hepatic contour may be due to underlying parenchymal disease.      IMPRESSION:  No pulmonary embolism.  Pulmonary interstitial edema.  Age-indeterminate T4 compression fracture new from 7/6/2018.                MARTHA JAY M.D., ATTENDING RADIOLOGIST  This document has been electronically signed. Jan 13 2020  3:30PM            [x ]  [place]  Reviewed and Copied to Chart  This report was requested by: Malcolm Aponte | Reference #: [id number]    You have not added a FLORY number. Keeping your FLORY number(s) up to date on the My FLORY Numbers page will enable the separation of your prescriptions from others' in the search results.    Others' Prescriptions  Patient Name:	Griselda Hudson	YOB: 1945  Address:	[address] [place] [address]	Sex:	Female  Rx Written	Rx Dispensed	Drug	Quantity	Days Supply	Prescriber Name  12/19/2019	12/20/2019	oxycodone-acetaminophen 5-325 mg tab	30	5	Enrike Kang MD  12/12/2019	12/13/2019	oxycodone-acetaminophen 5-325 mg tab	30	5	Enrike Kang MD  12/08/2019	12/09/2019	oxycodone-acetaminophen 5-325 mg tab	28	28	[facility]  11/15/2019	11/15/2019	oxycodone-acetaminophen 5-325 mg tab	10	2	[facility]  10/31/2019	11/01/2019	oxycodone-acetaminophen 5-325 mg tab	30	6	Enrike Kang MD  08/01/2019	08/09/2019	oxycodone-acetaminophen 5-325 mg tab	30	5	Enrike Kang MD  07/25/2019	07/26/2019	oxycodone-acetaminophen 5-325 mg tab	30	5	Enrike Kang MD  07/16/2019	07/17/2019	oxycodone-acetaminophen 5-325 mg tab	40	7	Mary Stone  03/21/2019	03/23/2019	oxycodone-acetaminophen 5-325 mg tab	21	7	Pamela Johnson MD

## 2020-01-17 NOTE — PROGRESS NOTE ADULT - SUBJECTIVE AND OBJECTIVE BOX
ELDER, DONI  74y  Female  HPI:  74 F PMHx of PE/DVT on coumadin, Asthma/COPD, autoimmune hepatitis on prednisone 10mg, recent admission for LLE chronic wound s/p debridement still has visiting nurse for dressing care, HTN presents with worsening SOB. Patient states 6 days ago, she went to urgent care for SOB, was diagnosed with pneumonia and discharged with 40mg prednisone and  doxycycline. Patient admits to being compliant with medications however admits to worsening of symptoms - admits to SOB at rest if she speaks for long periods of time. Denies fevers/chills, cough, URI, n/v/d, abdominal pain, dysuria, worsening leg swelling. (13 Jan 2020 16:56)    MEDICATIONS  (STANDING):  acetaminophen   Tablet .. 1000 milliGRAM(s) Oral every 8 hours  ALBUTerol    90 MICROgram(s) HFA Inhaler 1 Puff(s) Inhalation every 4 hours  albuterol/ipratropium for Nebulization 3 milliLiter(s) Nebulizer every 6 hours  amLODIPine   Tablet 10 milliGRAM(s) Oral at bedtime  chlorhexidine 4% Liquid 1 Application(s) Topical <User Schedule>  ferrous    sulfate 325 milliGRAM(s) Oral daily  furosemide    Tablet 20 milliGRAM(s) Oral daily  gabapentin 300 milliGRAM(s) Oral at bedtime  gabapentin 100 milliGRAM(s) Oral <User Schedule>  levoFLOXacin IVPB      levoFLOXacin IVPB 750 milliGRAM(s) IV Intermittent every 24 hours  lidocaine   Patch 1 Patch Transdermal daily  meperidine     Injectable 50 milliGRAM(s) IV Push once  meropenem  IVPB 1000 milliGRAM(s) IV Intermittent every 8 hours  metoprolol tartrate 50 milliGRAM(s) Oral daily  midazolam Injectable 3 milliGRAM(s) IV Push once  montelukast 10 milliGRAM(s) Oral at bedtime  mupirocin 2% Ointment 1 Application(s) Topical two times a day  pantoprazole    Tablet 40 milliGRAM(s) Oral before breakfast  predniSONE   Tablet 10 milliGRAM(s) Oral daily  senna 2 Tablet(s) Oral at bedtime  tacrolimus 0.5 milliGRAM(s) Oral every 12 hours  tiotropium 18 MICROgram(s) Capsule 1 Capsule(s) Inhalation daily  vancomycin  IVPB 1000 milliGRAM(s) IV Intermittent every 12 hours  warfarin 4 milliGRAM(s) Oral once    MEDICATIONS  (PRN):  oxyCODONE    IR 5 milliGRAM(s) Oral every 4 hours PRN Severe Pain (7 - 10)    INTERVAL EVENTS:    T(C): 35.6 (01-17-20 @ 05:15), Max: 35.7 (01-16-20 @ 20:42)  HR: 68 (01-17-20 @ 06:40) (68 - 89)  BP: 154/75 (01-17-20 @ 06:40) (151/85 - 197/89)  RR: 18 (01-17-20 @ 05:15) (18 - 20)  SpO2: 96% (01-17-20 @ 08:14) (96% - 96%)  Wt(kg): --Vital Signs Last 24 Hrs  T(C): 35.6 (17 Jan 2020 05:15), Max: 35.7 (16 Jan 2020 20:42)  T(F): 96 (17 Jan 2020 05:15), Max: 96.3 (16 Jan 2020 20:42)  HR: 68 (17 Jan 2020 06:40) (68 - 89)  BP: 154/75 (17 Jan 2020 06:40) (151/85 - 197/89)  BP(mean): --  RR: 18 (17 Jan 2020 05:15) (18 - 20)  SpO2: 96% (17 Jan 2020 08:14) (96% - 96%)    PHYSICAL EXAM:  GENERAL:   NECK: Supple, No JVD, Normal thyroid  CHEST/LUNG: Clear; No crackles or wheezing  HEART: S1, S2, Regular rate and rhythm;   ABDOMEN: Soft, Nontender, Nondistended; Bowel sounds present  EXTREMITIES: No clubbing, cyanosis, or edema  SKIN: No rashes or lesions    LABS:                         13.3   15.21 )-----------( 337      ( 17 Jan 2020 07:52 )             43.1             01-17    139  |  101  |  16  ----------------------------<  142<H>  3.9   |  24  |  1.0    Ca    8.6      17 Jan 2020 07:52  Mg     1.9     01-17    TPro  5.8<L>  /  Alb  3.5  /  TBili  0.5  /  DBili  x   /  AST  12  /  ALT  20  /  AlkPhos  183<H>  01-17    LIVER FUNCTIONS - ( 17 Jan 2020 07:52 )  Alb: 3.5 g/dL / Pro: 5.8 g/dL / ALK PHOS: 183 U/L / ALT: 20 U/L / AST: 12 U/L / GGT: x                   PT/INR - ( 17 Jan 2020 07:52 )   PT: 17.40 sec;   INR: 1.52 ratio      Culture - Fungal, Bronchial (collected 16 Jan 2020 07:45)  Source: .Bronchial None  Preliminary Report (17 Jan 2020 06:50):    Testing in progress    Culture - Bronchial (collected 16 Jan 2020 07:45)  Source: .Bronchial None  Gram Stain (17 Jan 2020 07:05):    Rare Squamous epithelial cells per low power field    Rare polymorphonuclear leukocytes per low power field    No organisms seen per oil power field    Culture - Fungal, Bronchial (collected 16 Jan 2020 07:45)  Source: .Bronchial None  Preliminary Report (17 Jan 2020 06:46):    Testing in progress    Culture - Bronchial (collected 16 Jan 2020 07:45)  Source: .Bronchial None  Gram Stain (17 Jan 2020 07:34):    Rare polymorphonuclear leukocytes seen per low power field    No squamous epithelial cells seen per low power field    No organisms seen per oil power field      RADIOLOGY & ADDITIONAL TESTS:

## 2020-01-17 NOTE — OCCUPATIONAL THERAPY INITIAL EVALUATION ADULT - RANGE OF MOTION EXAMINATION, UPPER EXTREMITY
bilateral UE Active ROM was WNL (within normal limits)/except R shoulder ~3/4-full flexion (baseline), Pt unable to move L shoulder at this time (arom, prom) 2* severe 10/10 pain. Cause unknown. RN, MD aware.

## 2020-01-17 NOTE — CONSULT NOTE ADULT - ASSESSMENT
74y old Female who presents with a chief complaint of Shortness of breath.    IMPRESSION:  # No PNA : clinically none, PE unremarkable, CXR/CT chest with no consolidation  # Superficial ulcer LLE laterally with no abscess/cellulitis    RECOMMENDATIONS:  -no need for ABx  -local care LLE as per BURN

## 2020-01-17 NOTE — DIETITIAN INITIAL EVALUATION ADULT. - OTHER INFO
Pt presented to ED c/o SOB and cough, improving. Per Pulm team b/l interstitial opacities inflammatory vs infectious etiology. MRSA swab positive. LLE wound infection noted. Hx autoimmune hepatitis.

## 2020-01-17 NOTE — OCCUPATIONAL THERAPY INITIAL EVALUATION ADULT - PLANNED THERAPY INTERVENTIONS, OT EVAL
parent/caregiver training.../transfer training/ADL retraining/IADL retraining/balance training/bed mobility training/strengthening/stretching/neuromuscular re-education/ROM

## 2020-01-17 NOTE — DIETITIAN INITIAL EVALUATION ADULT. - ENERGY NEEDS
estimated energy needs: 1360-1635kcal (25-30kcal/kgIBW) unknown ABW at present, possible wt loss. will adjust upon f/u depending on pt's reported nutr/wt hx  estimated protein needs: 55-65g (1.0-1.2g/kg)  estimated fluid needs: per LIP

## 2020-01-17 NOTE — OCCUPATIONAL THERAPY INITIAL EVALUATION ADULT - GENERAL OBSERVATIONS, REHAB EVAL
Pt encountered semi-johns in bed. +IV lock + ace bandage to LLE, distal to knee. Pt with INR of 1.52. Pt cleared for OT eval per Dr Andrew [phone]. Neuro sx also contacted [phone], cleared for OOB without brace. Pt noted with 10/10 pain in L shoulder with any active or passive movement (new since Wednesday as per pt).. Dr Andrew made aware. Pt also required cues throughout eval for body mechanics 2* decreased use of L arm impacting postural alignment during position changes. Pt agreeable to OT IE.

## 2020-01-17 NOTE — DIETITIAN INITIAL EVALUATION ADULT. - DIET TYPE
RECOMMENDATIONS: Start pt on DASH/TLC diet modifier. 2. Start daily MVI/minerals. RD will f/u with further recommendations upon f/u

## 2020-01-17 NOTE — DIETITIAN INITIAL EVALUATION ADULT. - FACTORS AFF FOOD INTAKE
RD attempted to speak with pt 4-5x, with other disciplinary teams. Pt previously reported poor PO intake and nausea during screening, however, per PCA pt ate % of meal while having lunch with visitor. Last BM documented 1/14. Per previous visit in July 2019, NKFA, supplemented b12, vit C, and FeSO4 PTA, and no chew/swallow difficulties. Will obtain updated nutr hx from pt upon f/u.

## 2020-01-17 NOTE — OCCUPATIONAL THERAPY INITIAL EVALUATION ADULT - PERTINENT HX OF CURRENT PROBLEM, REHAB EVAL
Pt admitted Jan 13, 2020 with c/o worsening shortness of breath. Pt recently sustained a fall at home and had an evac of a hematoma performed on LLE. Pt was receiving wound care at home before admission. Pt was also seen at urgent care for sob and cough and was found to have pneumonia which she is currently being tx for.  Upon evaluation from this admission, pt was found to have T4 fx.

## 2020-01-17 NOTE — PROGRESS NOTE ADULT - SUBJECTIVE AND OBJECTIVE BOX
DONI PATRICK  [facility] 005 [facility] ([facility] T[facility])      Patient is a 74y old  Female who presents with a chief complaint of Shortness of breath (17 Jan 2020 10:30)        -PMHx: Autoimmune hepatitis [Active]  Other chronic pulmonary embolism without acute cor pulmonale [Active]  Essential hypertension [Active]  Autoimmune hepatitis treated with steroids [Active]  Asthma [Active]  DVT (deep venous thrombosis) [Active]    -PSHx:S/P debridement  History of back surgery  No significant past surgical history      Interval events:  Patient seen and examined at bedside. Patient complaining of left shoulder pain that she says she has had since Wednesday, 10/10 pain. No other events. States breathing is better today, no chest pain.     REVIEW OF SYSTEMS:  CONSTITUTIONAL: No fever, weight loss, or fatigue  RESPIRATORY: No cough, wheezing, chills or hemoptysis; No shortness of breath  CARDIOVASCULAR: No chest pain, palpitations, dizziness, or leg swelling  GASTROINTESTINAL: No abdominal or epigastric pain. No nausea, vomiting, or hematemesis; No diarrhea or constipation. No melena or hematochezia.  NEUROLOGICAL: No headaches  LYMPH NODES: No enlarged glands  MUSCULOSKELETAL: +Left shoulder pain      T(C): , Max: 35.7 (01-16-20 @ 20:42)  HR: 68 (01-17-20 @ 06:40)  BP: 154/75 (01-17-20 @ 06:40)  RR: 18 (01-17-20 @ 05:15)  SpO2: 96% (01-17-20 @ 08:14)  CAPILLARY BLOOD GLUCOSE        PHYSICAL EXAM:  GENERAL: NAD, well-developed, AAOx3, obese  HEENT:  Atraumatic, Normocephalic  PULMONARY: Mild wheezing bilaterally. Crackles (much improved)  CARDIOVASCULAR: Regular rate and rhythm  GASTROINTESTINAL: Soft, Nontender  MUSCULOSKELETAL:  2+ Peripheral Pulses; left shoulder ttp, severely limited ROM  NEUROLOGY: non-focal  SKIN: No rashes or lesions      LABS:          13.3  15.21  )-------(337          43.1  N=72.9  L=21.2  MCV=87.6    139|101|16  ------------------<142<H>  3.9|24|1.0  eGFR:55<L>  Ca:8.6      PT/INR - ( 17 Jan 2020 07:52 )   PT: 17.40 sec;   INR: 1.52 ratio               Microbiology:    Culture - Fungal, Bronchial (collected 01-16-20 @ 07:45)  Source: .Bronchial None  Preliminary Report (01-17-20 @ 06:50):    Testing in progress    Culture - Bronchial (collected 01-16-20 @ 07:45)  Source: .Bronchial None  Gram Stain (01-17-20 @ 07:05):    Rare Squamous epithelial cells per low power field    Rare polymorphonuclear leukocytes per low power field    No organisms seen per oil power field    Culture - Fungal, Bronchial (collected 01-16-20 @ 07:45)  Source: .Bronchial None  Preliminary Report (01-17-20 @ 06:46):    Testing in progress    Culture - Bronchial (collected 01-16-20 @ 07:45)  Source: .Bronchial None  Gram Stain (01-17-20 @ 07:34):    Rare polymorphonuclear leukocytes seen per low power field    No squamous epithelial cells seen per low power field    No organisms seen per oil power field        RADIOLOGY & ADDITIONAL TESTS:  < from: Xray Thoracic Spine 2 View (01.16.20 @ 17:46) >  Findings/  Impression:    Diffuse osteopenia, with diffuse bilateral calcified pleural plaques markedly degrade quality of the images.    Stable post L1 kyphoplasty with chronic severe compression deformity.    Limited evaluation of the vertebral bodies and alignment. However please note that there is age-indeterminate mild superior endplate compression of T10 vertebral body and moderate compression fracture of T4 vertebral body, better seen on the CT chests dated January 13, 2020.    Moderate multilevel degenerative disc disease throughout the thoracic spine.      < end of copied text >    < from: Xray Lumbosacral Spine (01.16.20 @ 17:46) >  Impression:  1.  Post L1 kyphoplasty with chronic severe compression deformity, stable since February 2018, and a kyphosis deformity at this level.  2.  Moderate/severe multilevel degenerative changes worst at L4-5 and L5-S1, worsened since 2018.       < end of copied text >        Medications:  acetaminophen   Tablet .. 1000 milliGRAM(s) Oral every 8 hours  ALBUTerol    90 MICROgram(s) HFA Inhaler 1 Puff(s) Inhalation every 4 hours  albuterol/ipratropium for Nebulization 3 milliLiter(s) Nebulizer every 6 hours  amLODIPine   Tablet 10 milliGRAM(s) Oral at bedtime  chlorhexidine 4% Liquid 1 Application(s) Topical <User Schedule>  ferrous    sulfate 325 milliGRAM(s) Oral daily  furosemide    Tablet 20 milliGRAM(s) Oral daily  gabapentin 300 milliGRAM(s) Oral at bedtime  gabapentin 100 milliGRAM(s) Oral <User Schedule>  levoFLOXacin IVPB      levoFLOXacin IVPB 750 milliGRAM(s) IV Intermittent every 24 hours  lidocaine   Patch 1 Patch Transdermal daily  meperidine     Injectable 50 milliGRAM(s) IV Push once  meropenem  IVPB 1000 milliGRAM(s) IV Intermittent every 8 hours  metoprolol tartrate 50 milliGRAM(s) Oral daily  midazolam Injectable 3 milliGRAM(s) IV Push once  montelukast 10 milliGRAM(s) Oral at bedtime  mupirocin 2% Ointment 1 Application(s) Topical two times a day  oxyCODONE    IR 5 milliGRAM(s) Oral every 4 hours PRN  pantoprazole    Tablet 40 milliGRAM(s) Oral before breakfast  predniSONE   Tablet 10 milliGRAM(s) Oral daily  senna 2 Tablet(s) Oral at bedtime  tacrolimus 0.5 milliGRAM(s) Oral every 12 hours  tiotropium 18 MICROgram(s) Capsule 1 Capsule(s) Inhalation daily  vancomycin  IVPB 1000 milliGRAM(s) IV Intermittent every 12 hours  warfarin 4 milliGRAM(s) Oral once

## 2020-01-17 NOTE — PROCEDURE NOTE - NSPROCDETAILS_GEN_ALL_CORE
supine position/sterile dressing applied/location identified, draped/prepped, sterile technique used/sterile technique, catheter placed/ultrasound guidance/ultrasound assessment

## 2020-01-17 NOTE — CONSULT NOTE ADULT - SUBJECTIVE AND OBJECTIVE BOX
DONI PATRICK  74y, Female  Allergy: No Known Allergies      All historical available data reviewed.    HPI:  74 F PMHx of PE/DVT on coumadin, Asthma/COPD, autoimmune hepatitis on prednisone 10mg, recent admission for LLE chronic wound s/p debridement still has visiting nurse for dressing care, HTN presents with worsening SOB. Patient states 6 days ago, she went to urgent care for SOB, was diagnosed with pneumonia and discharged with 40mg prednisone and  doxycycline. Patient admits to being compliant with medications however admits to worsening of symptoms - admits to SOB at rest if she speaks for long periods of time. Denies fevers/chills, cough, URI, n/v/d, abdominal pain, dysuria, worsening leg swelling. (13 Jan 2020 16:56)  ID called for possibility of a PNA/cellulitis    FAMILY HISTORY:  No pertinent family history in first degree relatives    PAST MEDICAL & SURGICAL HISTORY:  Autoimmune hepatitis  Other chronic pulmonary embolism without acute cor pulmonale  Essential hypertension  Autoimmune hepatitis treated with steroids  Asthma  DVT (deep venous thrombosis)  S/P debridement  History of back surgery        VITALS:  T(F): 97, Max: 97 (01-17-20 @ 13:20)  HR: 78  BP: 180/86  RR: 18Vital Signs Last 24 Hrs  T(C): 36.1 (17 Jan 2020 13:20), Max: 36.1 (17 Jan 2020 13:20)  T(F): 97 (17 Jan 2020 13:20), Max: 97 (17 Jan 2020 13:20)  HR: 78 (17 Jan 2020 13:20) (68 - 89)  BP: 180/86 (17 Jan 2020 13:20) (154/75 - 197/89)  BP(mean): --  RR: 18 (17 Jan 2020 13:20) (18 - 18)  SpO2: 96% (17 Jan 2020 08:14) (96% - 96%)    TESTS & MEASUREMENTS:                        13.3   15.21 )-----------( 337      ( 17 Jan 2020 07:52 )             43.1     01-17    139  |  101  |  16  ----------------------------<  142<H>  3.9   |  24  |  1.0    Ca    8.6      17 Jan 2020 07:52  Mg     1.9     01-17    TPro  5.8<L>  /  Alb  3.5  /  TBili  0.5  /  DBili  x   /  AST  12  /  ALT  20  /  AlkPhos  183<H>  01-17    LIVER FUNCTIONS - ( 17 Jan 2020 07:52 )  Alb: 3.5 g/dL / Pro: 5.8 g/dL / ALK PHOS: 183 U/L / ALT: 20 U/L / AST: 12 U/L / GGT: x             Culture - Fungal, Bronchial (collected 01-16-20 @ 07:45)  Source: .Bronchial None  Preliminary Report (01-17-20 @ 06:50):    Testing in progress    Culture - Acid Fast - Bronchial w/Smear (collected 01-16-20 @ 07:45)  Source: .Bronchial None    Culture - Bronchial (collected 01-16-20 @ 07:45)  Source: .Bronchial None  Gram Stain (01-17-20 @ 07:05):    Rare Squamous epithelial cells per low power field    Rare polymorphonuclear leukocytes per low power field    No organisms seen per oil power field    Culture - Fungal, Bronchial (collected 01-16-20 @ 07:45)  Source: .Bronchial None  Preliminary Report (01-17-20 @ 06:46):    Testing in progress    Culture - Acid Fast - Bronchial w/Smear (collected 01-16-20 @ 07:45)  Source: .Bronchial None    Culture - Bronchial (collected 01-16-20 @ 07:45)  Source: .Bronchial None  Gram Stain (01-17-20 @ 07:34):    Rare polymorphonuclear leukocytes seen per low power field    No squamous epithelial cells seen per low power field    No organisms seen per oil power field            RADIOLOGY & ADDITIONAL TESTS:  Personal review of radiological diagnostics performed  Echo and EKG results noted when applicable.     MEDICATIONS:  acetaminophen   Tablet .. 1000 milliGRAM(s) Oral every 8 hours  ALBUTerol    90 MICROgram(s) HFA Inhaler 1 Puff(s) Inhalation every 4 hours  albuterol/ipratropium for Nebulization 3 milliLiter(s) Nebulizer every 6 hours  amLODIPine   Tablet 10 milliGRAM(s) Oral at bedtime  chlorhexidine 4% Liquid 1 Application(s) Topical <User Schedule>  ferrous    sulfate 325 milliGRAM(s) Oral daily  furosemide    Tablet 20 milliGRAM(s) Oral daily  gabapentin 300 milliGRAM(s) Oral at bedtime  gabapentin 100 milliGRAM(s) Oral <User Schedule>  levoFLOXacin IVPB      levoFLOXacin IVPB 750 milliGRAM(s) IV Intermittent every 24 hours  lidocaine   Patch 1 Patch Transdermal daily  meperidine     Injectable 50 milliGRAM(s) IV Push once  metoprolol tartrate 50 milliGRAM(s) Oral daily  midazolam Injectable 3 milliGRAM(s) IV Push once  montelukast 10 milliGRAM(s) Oral at bedtime  mupirocin 2% Ointment 1 Application(s) Topical two times a day  oxyCODONE    IR 5 milliGRAM(s) Oral every 4 hours PRN  pantoprazole    Tablet 40 milliGRAM(s) Oral before breakfast  predniSONE   Tablet 10 milliGRAM(s) Oral daily  senna 2 Tablet(s) Oral at bedtime  tacrolimus 0.5 milliGRAM(s) Oral every 12 hours  tiotropium 18 MICROgram(s) Capsule 1 Capsule(s) Inhalation daily  vancomycin  IVPB 1000 milliGRAM(s) IV Intermittent every 12 hours  warfarin 4 milliGRAM(s) Oral once      ANTIBIOTICS:  levoFLOXacin IVPB      levoFLOXacin IVPB 750 milliGRAM(s) IV Intermittent every 24 hours  vancomycin  IVPB 1000 milliGRAM(s) IV Intermittent every 12 hours

## 2020-01-17 NOTE — PROGRESS NOTE ADULT - ASSESSMENT
Patient is a 74y old Female who presents with a chief complaint of Shortness of breath.      # Cough and worsening Dyspnea- improving  -pt non toxic appearing Sat 96% on RA. Flu panel neg.  -Ground glass opacity on CT.    -On vancomycin, levaquin and meropenem as per pulmonary. MRSA swab positive. Will keep on Mupirocin BID for 5 days (day 3/5)  - cytomegalovirus titers pending  - procalcitonin level wnl  - Follow up bronchoscopy BAL results including cell count and cultures  - Urine Legionella and strep Ag- pending  - ID consult pending for final abx recs    #Back pain, Left shoulder pain  - T4 and T10 compression fractures  - Pain management recs appreciated  - f/u shoulder xray    # LLE Wound infection   - continue daily dressing. out patient f/u with Dr. Kang     # HTN   -On norvasc and Lasix    # History of Autoimmune hepatitis   - on pred 10mg outpatient.     # Genetic hypercoagulable disorder (DVT and PE in the past)   - Increase coumadin to 4mg. daily INR.     # DVT prophylaxis  -On Coumadin    #Pending: Xray, clinical improvement, ID consult

## 2020-01-17 NOTE — DIETITIAN INITIAL EVALUATION ADULT. - PERTINENT MEDS FT
coumadin, abx, oxycodone, norvasc, prednisone, albuterol, FeSO4, lasix, protonix, senna, tacrolimus,

## 2020-01-17 NOTE — DIETITIAN INITIAL EVALUATION ADULT. - PHYSICAL APPEARANCE
Two wts in EMR 71kg vs 77.1kg, will use IBW for calculations. IBW: 54.5kg+/-10%. In July 2019, two wts in EMR 87.1kg and 81.6kg, at that time, pt had significant edema. Fluid loss vs actual wt loss to be determined upon f/u. 2+ b/l ankle edema noted, L leg wound noted.

## 2020-01-18 NOTE — PROGRESS NOTE ADULT - SUBJECTIVE AND OBJECTIVE BOX
DONI PATRICK  [facility] T1[facility] 005 B ([facility] T1-3A)      Patient is a 74y old  Female who presents with a chief complaint of Shortness of breath (17 Jan 2020 16:10)        -PMHx: Autoimmune hepatitis [Active]  Other chronic pulmonary embolism without acute cor pulmonale [Active]  Essential hypertension [Active]  Autoimmune hepatitis treated with steroids [Active]  Asthma [Active]  DVT (deep venous thrombosis) [Active]    -PSHx:S/P debridement  History of back surgery  No significant past surgical history      Interval events:  Patient seen and examined at bedside. Overnight, she was complaining of severe back pain that hurt when she moved.     REVIEW OF SYSTEMS:  CONSTITUTIONAL: No fever, weight loss, or fatigue  RESPIRATORY: No cough, wheezing, chills or hemoptysis; No shortness of breath  CARDIOVASCULAR: No chest pain, palpitations, dizziness, or leg swelling  GASTROINTESTINAL: No abdominal or epigastric pain. No nausea, vomiting, or hematemesis; No diarrhea or constipation. No melena or hematochezia.  NEUROLOGICAL: No headaches  LYMPH NODES: No enlarged glands  MUSCULOSKELETAL: +Back pain      T(C): , Max: 36.1 (01-17-20 @ 13:20)  HR: 73 (01-18-20 @ 07:03)  BP: 134/66 (01-18-20 @ 07:03)  RR: 18 (01-18-20 @ 05:21)  SpO2: 95% (01-17-20 @ 19:58)  CAPILLARY BLOOD GLUCOSE          PHYSICAL EXAM:  GENERAL: In painful distress  NECK: Supple, No JVD  CHEST/LUNG: Decreased BS, No wheezing  HEART: S1, S2, Regular rate and rhythm  ABDOMEN: Soft, Nontender, Bowel sounds present  EXTREMITIES: Trace edema  SKIN: LLE with ace wrap      LABS:          12.5  9.06  )-------(264          40.8  N=54.4  L=35.8  MCV=89.7    141|103|19  ------------------<140<H>  3.1<L>|25|1.0  eGFR:55<L>  Ca:8.3<L>      PT/INR - ( 18 Jan 2020 06:08 )   PT: 18.90 sec;   INR: 1.65 ratio         PTT - ( 18 Jan 2020 06:08 )  PTT:27.7 sec      Microbiology:    Culture - Fungal, Bronchial (collected 01-16-20 @ 07:45)  Source: .Bronchial None  Preliminary Report (01-17-20 @ 06:50):    Testing in progress    Culture - Acid Fast - Bronchial w/Smear (collected 01-16-20 @ 07:45)  Source: .Bronchial None    Culture - Bronchial (collected 01-16-20 @ 07:45)  Source: .Bronchial None  Gram Stain (01-17-20 @ 07:05):    Rare Squamous epithelial cells per low power field    Rare polymorphonuclear leukocytes per low power field    No organisms seen per oil power field  Preliminary Report (01-17-20 @ 18:36):    No growth to date.    Culture - Fungal, Bronchial (collected 01-16-20 @ 07:45)  Source: .Bronchial None  Preliminary Report (01-17-20 @ 06:46):    Testing in progress    Culture - Acid Fast - Bronchial w/Smear (collected 01-16-20 @ 07:45)  Source: .Bronchial None    Culture - Bronchial (collected 01-16-20 @ 07:45)  Source: .Bronchial None  Gram Stain (01-17-20 @ 07:34):    Rare polymorphonuclear leukocytes seen per low power field    No squamous epithelial cells seen per low power field    No organisms seen per oil power field  Preliminary Report (01-17-20 @ 20:30):    Normal Respiratory Nikki present        RADIOLOGY & ADDITIONAL TESTS:  < from: Xray Chest 1 View- PORTABLE-Routine (01.17.20 @ 12:08) >  Impression:      Calcified fibrothorax.    Improved basilar opacifications.    < end of copied text >      < from: Xray Shoulder 2 Views, Left (01.17.20 @ 12:08) >  Impression:    No acute osseous abnormality.    Mild osteoarthritis.    < end of copied text >    Medications:  acetaminophen   Tablet .. 1000 milliGRAM(s) Oral every 8 hours  ALBUTerol    90 MICROgram(s) HFA Inhaler 1 Puff(s) Inhalation every 4 hours  albuterol/ipratropium for Nebulization 3 milliLiter(s) Nebulizer every 6 hours  amLODIPine   Tablet 10 milliGRAM(s) Oral at bedtime  chlorhexidine 4% Liquid 1 Application(s) Topical <User Schedule>  ferrous    sulfate 325 milliGRAM(s) Oral daily  furosemide    Tablet 20 milliGRAM(s) Oral daily  gabapentin 300 milliGRAM(s) Oral at bedtime  gabapentin 100 milliGRAM(s) Oral <User Schedule>  ibuprofen  Tablet. 400 milliGRAM(s) Oral every 6 hours PRN  lidocaine   Patch 1 Patch Transdermal daily  meperidine     Injectable 50 milliGRAM(s) IV Push once  metoprolol tartrate 50 milliGRAM(s) Oral daily  midazolam Injectable 3 milliGRAM(s) IV Push once  montelukast 10 milliGRAM(s) Oral at bedtime  multivitamin/minerals 1 Tablet(s) Oral daily  mupirocin 2% Ointment 1 Application(s) Topical two times a day  oxyCODONE    IR 5 milliGRAM(s) Oral every 4 hours PRN  pantoprazole    Tablet 40 milliGRAM(s) Oral before breakfast  predniSONE   Tablet 10 milliGRAM(s) Oral daily  senna 2 Tablet(s) Oral at bedtime  tacrolimus 0.5 milliGRAM(s) Oral every 12 hours  tiotropium 18 MICROgram(s) Capsule 1 Capsule(s) Inhalation daily

## 2020-01-18 NOTE — PROGRESS NOTE ADULT - ASSESSMENT
Patient is a 74y old Female who presents with a chief complaint of Shortness of breath.      # Cough and worsening Dyspnea- improving  -Per ID no PNA, does not require abx  -More looks like worsening interstitial lung disease.   -pt non toxic appearing saturating well on RA. Flu panel neg.  -Ground glass opacity on CT.    -MRSA swab positive. Will keep on Mupirocin BID for 5 days.   - check cytomegalovirus titers-pending  - procalcitonin level normal  - Follow up bronchoscopy BAL results including cell count and cultures  - Urine Legionella and strep Ag- pending; I reviewed with nurse and HO to send urine for Legionella today    #Back pain, Left shoulder pain  - T4 and T10 compression fractures  - Pain management recs appreciated; will add Advil 400mg po q6h prn  - Reviewed opioid use with patient and recommended increase oxycodone to 10 for when necessary use with severe pain  - f/u shoulder xray- no fracture, + osteoarthritis    # LLE Wound infection   - continue daily dressing. out patient f/u with Dr. Kang     # HTN   -On norvasc and Lasix    # History of Autoimmune hepatitis   - on pred 10mg outpatient.     # Genetic hypercoagulable disorder (DVT and PE in the past)   - Coumadin 4 last night, check daily INR.     # DVT prophylaxis  -On Coumadin    #Pending: Physiatry, possible 4a- Over the next few days  - Continue to try to improve pain control which will allow increased patient mobility

## 2020-01-18 NOTE — PROGRESS NOTE ADULT - ASSESSMENT
Patient is a 74y old Female who presents with a chief complaint of Shortness of breath.      # Cough and worsening Dyspnea- improving  -Per ID no PNA, does not require abx  -More looks like worsening interstitial lung disease.   -pt non toxic appearing saturating well on RA. Flu panel neg.  -Ground glass opacity on CT.    -MRSA swab positive. Will keep on Mupirocin BID for 5 days.   - check cytomegalovirus titers-pending  - procalcitonin level normal  - Follow up bronchoscopy BAL results including cell count and cultures  - Urine Legionella and strep Ag- pending    #Back pain, Left shoulder pain  - T4 and T10 compression fractures  - Pain management recs appreciated; will add Advil 400mg po q6h prn  - f/u shoulder xray- no fracture, osteoarthritis    # LLE Wound infection   - continue daily dressing. out patient f/u with Dr. Kang     # HTN   -On norvasc and Lasix    # History of Autoimmune hepatitis   - on pred 10mg outpatient.     # Genetic hypercoagulable disorder (DVT and PE in the past)   - Coumadin 4 last night, check daily INR.     # DVT prophylaxis  -On Coumadin    #Pending: Physiatry, possible 4a

## 2020-01-19 NOTE — PROGRESS NOTE ADULT - ASSESSMENT
Patient is a 74y old Female who presents with a chief complaint of Shortness of breath.      # Cough and worsening Dyspnea- improving  -Per ID no PNA, does not require abx  -More looks like worsening interstitial lung disease.   -pt non toxic appearing saturating well on RA. Flu panel neg.  -Ground glass opacity on CT.    -MRSA swab positive. Will keep on Mupirocin BID for 5 days.   - check cytomegalovirus titers- + IgM noted  - procalcitonin level normal  - Follow up bronchoscopy BAL results including cell count and cultures--NEG so far  - Urine Legionella and strep Ag- pending; I reviewed with nurse and HO to send urine for Legionella today    #Back pain, Left shoulder pain  - T4 and T10 compression fractures  - Pain management recs appreciated; will add Advil 400mg po q6h prn  - Reviewed opioid use with patient and recommended increase oxycodone to 10 for when necessary use with severe pain  - f/u shoulder xray- no fracture, + osteoarthritis    # LLE Wound infection   - continue daily dressing. out patient f/u with Dr. Kang     # HTN   -On norvasc and Lasix    # History of Autoimmune hepatitis   - on pred 10mg outpatient. --contributes to her osteoporosis and repeated Vertebral FX    # Genetic hypercoagulable disorder (DVT and PE in the past)   - Coumadin 4 last night, check daily INR. -therapeutic    # DVT prophylaxis  -On Coumadin    #Pending: Physiatry, possible 4a- was the plan- now being informed that might not be available, & to explore SNF for short term rehab  - Continue to try to improve pain control which will allow increased patient mobility

## 2020-01-19 NOTE — PROGRESS NOTE ADULT - SUBJECTIVE AND OBJECTIVE BOX
Chart reviewed, patient examined. Pertinent results reviewed.  Case discussed with HO; specialist f/u reviewed  HD#6; Patient continues to have significant posterior thoracic pain, but slept slightly better with 10mg oxycodone    Patient is a 74y old  Female who presented with a chief complaint of Shortness of breath (17 Jan 2020 16:10) Which has improved mildly with the present therapeutic course. She does c/o slight Nausea from the pain meds. She is having BMs w/o problems.        -PMHx: Autoimmune hepatitis [Active]  Other chronic pulmonary embolism without acute cor pulmonale [Active]  Essential hypertension [Active]  Autoimmune hepatitis treated with steroids [Active]  Asthma [Active]  DVT (deep venous thrombosis) [Active]    -PSHx:S/P debridement  History of back surgery  No significant past surgical history    SH: pt reports her  is presently hosp'd @ [facility]    Interval events:  Patient seen and examined at bedside. Overnight, she had < severe severe back pain that hurt when she moved or walked  Patient has been seen by the pain doctor, ID and pulmonary follow-up.    REVIEW OF SYSTEMS:  CONSTITUTIONAL: No fever, weight loss, or fatigue  RESPIRATORY: No cough, wheezing, chills or hemoptysis; less shortness of breath-Main issue is pain on breathing or movement  CARDIOVASCULAR: + Post chest pain; no palpitations, dizziness, or leg swelling  GASTROINTESTINAL: No abdominal or epigastric pain. Positive mild to moderate nausea; no vomiting, or hematemesis; No diarrhea or constipation. No melena or hematochezia.  NEUROLOGICAL: No headaches  LYMPH NODES: No enlarged glands  MUSCULOSKELETAL: +Back pain  MEDICATIONS  (STANDING):  acetaminophen   Tablet .. 1000 milliGRAM(s) Oral every 8 hours  ALBUTerol    90 MICROgram(s) HFA Inhaler 1 Puff(s) Inhalation every 4 hours  albuterol/ipratropium for Nebulization 3 milliLiter(s) Nebulizer every 6 hours  amLODIPine   Tablet 10 milliGRAM(s) Oral at bedtime  chlorhexidine 4% Liquid 1 Application(s) Topical <User Schedule>  ferrous    sulfate 325 milliGRAM(s) Oral daily  furosemide    Tablet 20 milliGRAM(s) Oral daily  gabapentin 300 milliGRAM(s) Oral at bedtime  gabapentin 100 milliGRAM(s) Oral <User Schedule>  lidocaine   Patch 1 Patch Transdermal daily  meperidine     Injectable 50 milliGRAM(s) IV Push once  metoprolol tartrate 50 milliGRAM(s) Oral daily  midazolam Injectable 3 milliGRAM(s) IV Push once  montelukast 10 milliGRAM(s) Oral at bedtime  multivitamin/minerals 1 Tablet(s) Oral daily  mupirocin 2% Ointment 1 Application(s) Topical two times a day  pantoprazole    Tablet 40 milliGRAM(s) Oral before breakfast  predniSONE   Tablet 10 milliGRAM(s) Oral daily  senna 2 Tablet(s) Oral at bedtime  tacrolimus 0.5 milliGRAM(s) Oral every 12 hours  tiotropium 18 MICROgram(s) Capsule 1 Capsule(s) Inhalation daily    MEDICATIONS  (PRN):  ibuprofen  Tablet. 400 milliGRAM(s) Oral every 6 hours PRN Moderate Pain (4 - 6)  oxyCODONE    IR 10 milliGRAM(s) Oral every 6 hours PRN Severe Pain (7 - 10)        PHYSICAL EXAM:  GENERAL: In pain While sitting in a bedside chair; AAOx4  NECK: Supple, No JVD  CHEST/LUNG: + bibasilar-min crackles-R>L, No wheezing; Tender to palpation over the middle thoracic spine  HEART:  RRR; no MRG  ABDOMEN: Soft, Nontender, Bowel sounds present  EXTREMITIES: Trace edema  SKIN: LLE with ace wrap; skin atrophy with many ecchymoses  NEURO: Generalized moderate weakness but no focal deficit  PSY: patient anxious and frustrated with her general status and pain      LABS:          12.5  9.06  )-------(264          40.8  N=54.4  L=35.8  MCV=89.7    141|103|19  ------------------<140<H>  3.1<L>|25|1.0  eGFR:55<L>  Ca:8.3<L>      PT/INR - ( 18 Jan 2020 06:08 )   PT: 18.90 sec;   INR: 1.65 ratio         PTT - ( 18 Jan 2020 06:08 )  PTT:27.7 sec      Microbiology:    Culture - Fungal, Bronchial (collected 01-16-20 @ 07:45)  Source: .Bronchial None  Preliminary Report (01-17-20 @ 06:50):    Testing in progress    Culture - Acid Fast - Bronchial w/Smear (collected 01-16-20 @ 07:45)  Source: .Bronchial None    Culture - Bronchial (collected 01-16-20 @ 07:45)  Source: .Bronchial None  Gram Stain (01-17-20 @ 07:05):    Rare Squamous epithelial cells per low power field    Rare polymorphonuclear leukocytes per low power field    No organisms seen per oil power field  Preliminary Report (01-17-20 @ 18:36):    No growth to date.    Culture - Fungal, Bronchial (collected 01-16-20 @ 07:45)  Source: .Bronchial None  Preliminary Report (01-17-20 @ 06:46):    Testing in progress    Culture - Acid Fast - Bronchial w/Smear (collected 01-16-20 @ 07:45)  Source: .Bronchial None    Culture - Bronchial (collected 01-16-20 @ 07:45)  Source: .Bronchial None  Gram Stain (01-17-20 @ 07:34):    Rare polymorphonuclear leukocytes seen per low power field    No squamous epithelial cells seen per low power field    No organisms seen per oil power field  Preliminary Report (01-17-20 @ 20:30):    Normal Respiratory Nikki present        RADIOLOGY & ADDITIONAL TESTS:  < from: Xray Chest 1 View- PORTABLE-Routine (01.17.20 @ 12:08) >  Impression:      Calcified fibrothorax.    Improved basilar opacifications.    < end of copied text >      < from: Xray Shoulder 2 Views, Left (01.17.20 @ 12:08) >  Impression:    No acute osseous abnormality.    Mild osteoarthritis.    < end of copied text >

## 2020-01-20 NOTE — PROGRESS NOTE ADULT - SUBJECTIVE AND OBJECTIVE BOX
Patient is a 74y old  Female who presents with a chief complaint of Shortness of breath (20 Jan 2020 09:56)        SUBJECTIVE:  no events overnight. reports improved breathing    REVIEW OF SYSTEMS:    CONSTITUTIONAL:   no fever   no chills.  no weight gain   no weight loss    EYES:   no discharge,   no pain  no redness,   no visual changes.    ENT:   Ears: no ear pain and no hearing problems.  Nose: no nasal congestion and no nasal drainage.  Mouth/Throat: no dysphagia,  no hoarseness and no throat pain.  Neck: no lumps, no pain, no stiffness and no swollen glands.     CARDIOVASCULAR:   no chest pain,   no swelling  no palpitaions  no syncope    RESPIRATORY:  no SOB,  no wheezing ,  no respiratory difficulty  no sputum production    GASTROINTESTINAL:   no abdominal pain,   no constipation,   no diarrhea,   no vomiting.    GENITOURINARY:  no dysuria,   no frequency,   no urgency  no hematuria.    MUSCULOSKELETAL:   no back pain,   no musculoskeletal pain,  no weakness.    SKIN:   no jaundice,   no lesions,   no pruritis,   no rashes.    NEURO:   no loss of consciousness,   no gait abnormality,   no headache,   no sensory deficits,   no weakness.    PSYCHIATRIC:   no known mental health issues  no anxiety  no depression    ALLERGIC/IMMUNOLOGIC:   No active allergic or immunologic issues      PHYSICAL EXAM  Vital Signs Last 24 Hrs  T(C): 36.1 (20 Jan 2020 05:20), Max: 36.1 (20 Jan 2020 05:20)  T(F): 97 (20 Jan 2020 05:20), Max: 97 (20 Jan 2020 05:20)  HR: 83 (20 Jan 2020 05:20) (76 - 83)  BP: 149/73 (20 Jan 2020 05:20) (149/73 - 172/78)  BP(mean): --  RR: 16 (20 Jan 2020 05:20) (16 - 18)  SpO2: 98% (20 Jan 2020 07:50) (97% - 98%)    CONSTITUTIONAL:  Well nourished.  NAD    ENT:   Airway patent,   Nasal mucosa clear.  Mouth with normal mucosa.   Throat has no vesicles,  no oropharyngeal exudates and uvula is midline.  No thrush    EYES:   Clear bilaterally,   pupils equal,   round and reactive to light.    CARDIAC:   Normal rate,   regular rhythm.    Heart sounds S1, S2.   normal  cardiac impulse  no edema      CAROTID:   normal systolic impulse  no bruits    RESPIRATORY:   no w/r/r/,   normal chest expansion  not tachypneic,  no use of accessory muscles    GASTROINTESTINAL:  Abdomen soft,   non-tender,   no guarding,   + BS    MUSCULOSKELETAL:   range of motion is not limited,  no muscle or joint tenderness  no clubbing, cyanosis    NEUROLOGICAL:   Alert and oriented   no focal deficits in cranial nerve areas  no motor or sensory deficits.  pertinent DTRs normal    SKIN:   Skin normal color for race,   warm,   dry and intact.   No evidence of rash.    PSYCHIATRIC:   Alert and oriented to person,   place, time/situation.   normal mood and affect.   no apparent risk to self or others.    HEME LYMPH:   no splenomegaly.  No cervical  lymphadenopathy.  no inguinal lymphadenopathy        LABS:                          13.1   12.92 )-----------( 299      ( 20 Jan 2020 07:22 )             42.9                                               01-20    142  |  104  |  20  ----------------------------<  134<H>  4.3   |  27  |  1.0    Ca    8.9      20 Jan 2020 07:22  Mg     2.1     01-20    TPro  5.6<L>  /  Alb  3.5  /  TBili  0.4  /  DBili  x   /  AST  14  /  ALT  17  /  AlkPhos  181<H>  01-20      PT/INR - ( 19 Jan 2020 05:34 )   PT: 27.90 sec;   INR: 2.45 ratio         PTT - ( 19 Jan 2020 05:34 )  PTT:30.1 sec                                                                                     LIVER FUNCTIONS - ( 20 Jan 2020 07:22 )  Alb: 3.5 g/dL / Pro: 5.6 g/dL / ALK PHOS: 181 U/L / ALT: 17 U/L / AST: 14 U/L / GGT: x                                                                                                MEDICATIONS  (STANDING):  acetaminophen   Tablet .. 1000 milliGRAM(s) Oral every 8 hours  ALBUTerol    90 MICROgram(s) HFA Inhaler 1 Puff(s) Inhalation every 4 hours  albuterol/ipratropium for Nebulization 3 milliLiter(s) Nebulizer every 6 hours  amLODIPine   Tablet 10 milliGRAM(s) Oral at bedtime  chlorhexidine 4% Liquid 1 Application(s) Topical <User Schedule>  ferrous    sulfate 325 milliGRAM(s) Oral daily  furosemide    Tablet 20 milliGRAM(s) Oral daily  gabapentin 300 milliGRAM(s) Oral at bedtime  gabapentin 100 milliGRAM(s) Oral <User Schedule>  lidocaine   Patch 1 Patch Transdermal daily  meperidine     Injectable 50 milliGRAM(s) IV Push once  metoprolol tartrate 50 milliGRAM(s) Oral daily  midazolam Injectable 3 milliGRAM(s) IV Push once  montelukast 10 milliGRAM(s) Oral at bedtime  multivitamin/minerals 1 Tablet(s) Oral daily  ondansetron   Disintegrating Tablet 4 milliGRAM(s) Oral four times a day  pantoprazole    Tablet 40 milliGRAM(s) Oral before breakfast  predniSONE   Tablet 10 milliGRAM(s) Oral daily  senna 2 Tablet(s) Oral at bedtime  tacrolimus 0.5 milliGRAM(s) Oral every 12 hours  tiotropium 18 MICROgram(s) Capsule 1 Capsule(s) Inhalation daily    MEDICATIONS  (PRN):  ibuprofen  Tablet. 400 milliGRAM(s) Oral every 6 hours PRN Moderate Pain (4 - 6)  oxyCODONE    IR 10 milliGRAM(s) Oral every 6 hours PRN Severe Pain (7 - 10) Patient is a 74y old  Female who presents with a chief complaint of Shortness of breath (20 Jan 2020 09:56)        SUBJECTIVE:  no events overnight. reports improved breathing    REVIEW OF SYSTEMS:    CONSTITUTIONAL:   no fever   no chills.  no weight gain   no weight loss    EYES:   no discharge,   no pain  no redness,   no visual changes.    ENT:   Ears: no ear pain and no hearing problems.  Nose: no nasal congestion and no nasal drainage.  Mouth/Throat: no dysphagia,  no hoarseness and no throat pain.  Neck: no lumps, no pain, no stiffness and no swollen glands.     CARDIOVASCULAR:   no chest pain,   no swelling  no palpitaions  no syncope    RESPIRATORY:  no SOB,  no wheezing ,  no respiratory difficulty  no sputum production    GASTROINTESTINAL:   no abdominal pain,   no constipation,   no diarrhea,   no vomiting.    GENITOURINARY:  no dysuria,   no frequency,   no urgency  no hematuria.    MUSCULOSKELETAL:   no back pain,   no musculoskeletal pain,  no weakness.    SKIN:   no jaundice,   no lesions,   no pruritis,   no rashes.    NEURO:   no loss of consciousness,   no gait abnormality,   no headache,   no sensory deficits,   no weakness.    PSYCHIATRIC:   no known mental health issues  no anxiety  no depression    ALLERGIC/IMMUNOLOGIC:   No active allergic or immunologic issues      PHYSICAL EXAM  Vital Signs Last 24 Hrs  T(C): 36.1 (20 Jan 2020 05:20), Max: 36.1 (20 Jan 2020 05:20)  T(F): 97 (20 Jan 2020 05:20), Max: 97 (20 Jan 2020 05:20)  HR: 83 (20 Jan 2020 05:20) (76 - 83)  BP: 149/73 (20 Jan 2020 05:20) (149/73 - 172/78)  BP(mean): --  RR: 16 (20 Jan 2020 05:20) (16 - 18)  SpO2: 98% (20 Jan 2020 07:50) (97% - 98%)    CONSTITUTIONAL:  Well nourished.  NAD    ENT:   Airway patent,   Nasal mucosa clear.  No thrush    EYES:   Clear bilaterally,   pupils equal,   round and reactive to light.    CARDIAC:   Normal rate,   regular rhythm.    Heart sounds S1, S2.   normal  cardiac impulse  no edema      CAROTID:   normal systolic impulse  no bruits    RESPIRATORY:   no w/r/r/,   normal chest expansion  not tachypneic,  no use of accessory muscles    GASTROINTESTINAL:  Abdomen soft,   non-tender,   no guarding,   + BS    MUSCULOSKELETAL:   range of motion is not limited,  no muscle or joint tenderness  no clubbing, cyanosis    NEUROLOGICAL:   Alert and oriented   no focal deficits in cranial nerve areas  no motor or sensory deficits.  pertinent DTRs normal    SKIN:   Skin normal color for race,   warm,   dry and intact.   No evidence of rash.    PSYCHIATRIC:   Alert and oriented to person,   place, time/situation.   normal mood and affect.   no apparent risk to self or others.    HEME LYMPH:   No cervical  lymphadenopathy.  no inguinal lymphadenopathy        LABS:                          13.1   12.92 )-----------( 299      ( 20 Jan 2020 07:22 )             42.9                                               01-20    142  |  104  |  20  ----------------------------<  134<H>  4.3   |  27  |  1.0    Ca    8.9      20 Jan 2020 07:22  Mg     2.1     01-20    TPro  5.6<L>  /  Alb  3.5  /  TBili  0.4  /  DBili  x   /  AST  14  /  ALT  17  /  AlkPhos  181<H>  01-20      PT/INR - ( 19 Jan 2020 05:34 )   PT: 27.90 sec;   INR: 2.45 ratio         PTT - ( 19 Jan 2020 05:34 )  PTT:30.1 sec                                                                                     LIVER FUNCTIONS - ( 20 Jan 2020 07:22 )  Alb: 3.5 g/dL / Pro: 5.6 g/dL / ALK PHOS: 181 U/L / ALT: 17 U/L / AST: 14 U/L / GGT: x                                                                                                MEDICATIONS  (STANDING):  acetaminophen   Tablet .. 1000 milliGRAM(s) Oral every 8 hours  ALBUTerol    90 MICROgram(s) HFA Inhaler 1 Puff(s) Inhalation every 4 hours  albuterol/ipratropium for Nebulization 3 milliLiter(s) Nebulizer every 6 hours  amLODIPine   Tablet 10 milliGRAM(s) Oral at bedtime  chlorhexidine 4% Liquid 1 Application(s) Topical <User Schedule>  ferrous    sulfate 325 milliGRAM(s) Oral daily  furosemide    Tablet 20 milliGRAM(s) Oral daily  gabapentin 300 milliGRAM(s) Oral at bedtime  gabapentin 100 milliGRAM(s) Oral <User Schedule>  lidocaine   Patch 1 Patch Transdermal daily  meperidine     Injectable 50 milliGRAM(s) IV Push once  metoprolol tartrate 50 milliGRAM(s) Oral daily  midazolam Injectable 3 milliGRAM(s) IV Push once  montelukast 10 milliGRAM(s) Oral at bedtime  multivitamin/minerals 1 Tablet(s) Oral daily  ondansetron   Disintegrating Tablet 4 milliGRAM(s) Oral four times a day  pantoprazole    Tablet 40 milliGRAM(s) Oral before breakfast  predniSONE   Tablet 10 milliGRAM(s) Oral daily  senna 2 Tablet(s) Oral at bedtime  tacrolimus 0.5 milliGRAM(s) Oral every 12 hours  tiotropium 18 MICROgram(s) Capsule 1 Capsule(s) Inhalation daily    MEDICATIONS  (PRN):  ibuprofen  Tablet. 400 milliGRAM(s) Oral every 6 hours PRN Moderate Pain (4 - 6)  oxyCODONE    IR 10 milliGRAM(s) Oral every 6 hours PRN Severe Pain (7 - 10)

## 2020-01-20 NOTE — PROGRESS NOTE ADULT - SUBJECTIVE AND OBJECTIVE BOX
DONI PATRICK  [facility] [address] [facility]      Patient is a 74y old  Female who presents with a chief complaint of Shortness of breath (20 Jan 2020 10:29)        -PMHx: Autoimmune hepatitis [Active]  Other chronic pulmonary embolism without acute cor pulmonale [Active]  Essential hypertension [Active]  Autoimmune hepatitis treated with steroids [Active]  Asthma [Active]  DVT (deep venous thrombosis) [Active]    -PSHx:S/P debridement  History of back surgery  No significant past surgical history      Interval events:  Patient seen and examined at bedside. No acute events overnight. Patient states her pain is much improved. No sob.    REVIEW OF SYSTEMS:  CONSTITUTIONAL: No fever, weight loss, or fatigue  RESPIRATORY: No cough, wheezing, chills or hemoptysis; No shortness of breath  CARDIOVASCULAR: No chest pain, palpitations, dizziness, or leg swelling  GASTROINTESTINAL: No abdominal or epigastric pain. No nausea, vomiting, or hematemesis; No diarrhea or constipation. No melena or hematochezia.  NEUROLOGICAL: No headaches  LYMPH NODES: No enlarged glands  MUSCULOSKELETAL: No joint pain or swelling; No muscle, back, or extremity pain      T(C): , Max: 36.1 (01-20-20 @ 05:20)  HR: 83 (01-20-20 @ 05:20)  BP: 149/73 (01-20-20 @ 05:20)  RR: 16 (01-20-20 @ 05:20)  SpO2: 98% (01-20-20 @ 07:50)  CAPILLARY BLOOD GLUCOSE        PHYSICAL EXAM:  GENERAL: NAD  NECK: Supple, No JVD  CHEST/LUNG: Decreased BS, No wheezing  HEART: S1, S2, Regular rate and rhythm  ABDOMEN: Soft, Nontender, Bowel sounds present  EXTREMITIES: Trace edema  SKIN: LLE with ace wrap    LABS:          13.1  12.92  )-------(299          42.9  N=59.8  L=30.7  MCV=87.7    142|104|20  ------------------<134<H>  4.3|27|1.0  eGFR:55<L>  Ca:8.9      PT/INR - ( 19 Jan 2020 05:34 )   PT: 27.90 sec;   INR: 2.45 ratio         PTT - ( 19 Jan 2020 05:34 )  PTT:30.1 sec      Microbiology:    Culture - Fungal, Bronchial (collected 01-16-20 @ 07:45)  Source: .Bronchial None  Preliminary Report (01-17-20 @ 06:50):    Testing in progress    Culture - Acid Fast - Bronchial w/Smear (collected 01-16-20 @ 07:45)  Source: .Bronchial None  Preliminary Report (01-18-20 @ 15:03):    Culture is being performed.    Culture - Bronchial (collected 01-16-20 @ 07:45)  Source: .Bronchial None  Gram Stain (01-17-20 @ 07:05):    Rare Squamous epithelial cells per low power field    Rare polymorphonuclear leukocytes per low power field    No organisms seen per oil power field  Final Report (01-18-20 @ 19:20):    Normal Respiratory Nikki present    Culture - Fungal, Bronchial (collected 01-16-20 @ 07:45)  Source: .Bronchial None  Preliminary Report (01-17-20 @ 06:46):    Testing in progress    Culture - Acid Fast - Bronchial w/Smear (collected 01-16-20 @ 07:45)  Source: .Bronchial None  Preliminary Report (01-18-20 @ 15:03):    Culture is being performed.    Culture - Bronchial (collected 01-16-20 @ 07:45)  Source: .Bronchial None  Gram Stain (01-17-20 @ 07:34):    Rare polymorphonuclear leukocytes seen per low power field    No squamous epithelial cells seen per low power field    No organisms seen per oil power field  Final Report (01-18-20 @ 19:04):    Normal Respiratory Nikki present        RADIOLOGY & ADDITIONAL TESTS:        Medications:  acetaminophen   Tablet .. 1000 milliGRAM(s) Oral every 8 hours  ALBUTerol    90 MICROgram(s) HFA Inhaler 1 Puff(s) Inhalation every 4 hours  albuterol/ipratropium for Nebulization 3 milliLiter(s) Nebulizer every 6 hours  amLODIPine   Tablet 10 milliGRAM(s) Oral at bedtime  chlorhexidine 4% Liquid 1 Application(s) Topical <User Schedule>  ferrous    sulfate 325 milliGRAM(s) Oral daily  furosemide    Tablet 20 milliGRAM(s) Oral daily  gabapentin 300 milliGRAM(s) Oral at bedtime  gabapentin 100 milliGRAM(s) Oral <User Schedule>  ibuprofen  Tablet. 400 milliGRAM(s) Oral every 6 hours PRN  lidocaine   Patch 1 Patch Transdermal daily  meperidine     Injectable 50 milliGRAM(s) IV Push once  metoprolol tartrate 50 milliGRAM(s) Oral daily  midazolam Injectable 3 milliGRAM(s) IV Push once  montelukast 10 milliGRAM(s) Oral at bedtime  multivitamin/minerals 1 Tablet(s) Oral daily  ondansetron   Disintegrating Tablet 4 milliGRAM(s) Oral four times a day  oxyCODONE    IR 10 milliGRAM(s) Oral every 6 hours PRN  pantoprazole    Tablet 40 milliGRAM(s) Oral before breakfast  predniSONE   Tablet 60 milliGRAM(s) Oral daily  senna 2 Tablet(s) Oral at bedtime  tacrolimus 0.5 milliGRAM(s) Oral every 12 hours  tiotropium 18 MICROgram(s) Capsule 1 Capsule(s) Inhalation daily

## 2020-01-20 NOTE — PROGRESS NOTE ADULT - SUBJECTIVE AND OBJECTIVE BOX
Chart reviewed, patient examined. Pertinent results reviewed.  Case discussed with HO; specialist f/u reviewed  HD#7; Patient continues to have significant posterior thoracic pain,    Patient is a 74y old  Female who presented with a chief complaint of Shortness of breath (17 Jan 2020 16:10) Which has improved mildly with the present therapeutic course. She does c/o slight Nausea from the pain meds. She is having BMs w/o problems.        -PMHx: Autoimmune hepatitis [Active]  Other chronic pulmonary embolism without acute cor pulmonale [Active]  Essential hypertension [Active]  Autoimmune hepatitis treated with steroids [Active]  Asthma [Active]  DVT (deep venous thrombosis) [Active]    -PSHx:S/P debridement  History of back surgery  No significant past surgical history    SH: pt reports her  is presently hosp'd @ [facility]    Interval events:  Patient seen and examined at bedside. Overnight, she had < severe severe back pain that hurt when she moved or walked  Patient has been seen by the pain doctor, ID and pulmonary follow-up.    REVIEW OF SYSTEMS:  CONSTITUTIONAL: No fever, weight loss, or fatigue  RESPIRATORY: No cough, wheezing, chills or hemoptysis; less shortness of breath-Main issue is pain on breathing or movement  CARDIOVASCULAR: + Post chest pain; no palpitations, dizziness, or leg swelling  GASTROINTESTINAL: No abdominal or epigastric pain. Positive mild to moderate nausea; no vomiting, or hematemesis; No diarrhea or constipation. No melena or hematochezia.  NEUROLOGICAL: No headaches  LYMPH NODES: No enlarged glands  MUSCULOSKELETAL: +Back pain    MEDICATIONS  (STANDING):  acetaminophen   Tablet .. 1000 milliGRAM(s) Oral every 8 hours  ALBUTerol    90 MICROgram(s) HFA Inhaler 1 Puff(s) Inhalation every 4 hours  albuterol/ipratropium for Nebulization 3 milliLiter(s) Nebulizer every 6 hours  amLODIPine   Tablet 10 milliGRAM(s) Oral at bedtime  chlorhexidine 4% Liquid 1 Application(s) Topical <User Schedule>  ferrous    sulfate 325 milliGRAM(s) Oral daily  furosemide    Tablet 20 milliGRAM(s) Oral daily  gabapentin 300 milliGRAM(s) Oral at bedtime  gabapentin 100 milliGRAM(s) Oral <User Schedule>  lidocaine   Patch 1 Patch Transdermal daily  meperidine     Injectable 50 milliGRAM(s) IV Push once  metoprolol tartrate 50 milliGRAM(s) Oral daily  midazolam Injectable 3 milliGRAM(s) IV Push once  montelukast 10 milliGRAM(s) Oral at bedtime  multivitamin/minerals 1 Tablet(s) Oral daily  ondansetron   Disintegrating Tablet 4 milliGRAM(s) Oral four times a day  pantoprazole    Tablet 40 milliGRAM(s) Oral before breakfast  predniSONE   Tablet 10 milliGRAM(s) Oral daily  senna 2 Tablet(s) Oral at bedtime  tacrolimus 0.5 milliGRAM(s) Oral every 12 hours  tiotropium 18 MICROgram(s) Capsule 1 Capsule(s) Inhalation daily    MEDICATIONS  (PRN):  ibuprofen  Tablet. 400 milliGRAM(s) Oral every 6 hours PRN Moderate Pain (4 - 6)  oxyCODONE    IR 10 milliGRAM(s) Oral every 6 hours PRN Severe Pain (7 - 10)      PHYSICAL EXAM:  GENERAL: In pain While sitting in a bedside chair; AAOx4  NECK: Supple, No JVD  CHEST/LUNG: + bibasilar-min crackles-R>L, No wheezing; Tender to palpation over the middle thoracic spine  HEART:  RRR; no MRG  ABDOMEN: Soft, Nontender, Bowel sounds present  EXTREMITIES: Trace edema  SKIN: LLE with ace wrap; skin atrophy with many ecchymoses- Specially upper extremity  NEURO: Generalized moderate weakness but no focal deficit  PSY: patient anxious and frustrated with her general status and pain      LABS:          12.5  9.06  )-------(264          40.8  N=54.4  L=35.8  MCV=89.7    141|103|19  ------------------<140<H>  3.1<L>|25|1.0  eGFR:55<L>  Ca:8.3<L>      PT/INR - ( 18 Jan 2020 06:08 )   PT: 18.90 sec;   INR: 1.65 ratio         PTT - ( 18 Jan 2020 06:08 )  PTT:27.7 sec  On AC for repeated DVT    Microbiology:    Culture - Fungal, Bronchial (collected 01-16-20 @ 07:45)  Source: .Bronchial None  Preliminary Report (01-17-20 @ 06:50):    Testing in progress    Culture - Acid Fast - Bronchial w/Smear (collected 01-16-20 @ 07:45)  Source: .Bronchial None    Culture - Bronchial (collected 01-16-20 @ 07:45)  Source: .Bronchial None  Gram Stain (01-17-20 @ 07:05):    Rare Squamous epithelial cells per low power field    Rare polymorphonuclear leukocytes per low power field    No organisms seen per oil power field  Preliminary Report (01-17-20 @ 18:36):    No growth to date.    Culture - Fungal, Bronchial (collected 01-16-20 @ 07:45)  Source: .Bronchial None  Preliminary Report (01-17-20 @ 06:46):    Testing in progress    Culture - Acid Fast - Bronchial w/Smear (collected 01-16-20 @ 07:45)  Source: .Bronchial None    Culture - Bronchial (collected 01-16-20 @ 07:45)  Source: .Bronchial None  Gram Stain (01-17-20 @ 07:34):    Rare polymorphonuclear leukocytes seen per low power field    No squamous epithelial cells seen per low power field    No organisms seen per oil power field  Preliminary Report (01-17-20 @ 20:30):    Normal Respiratory Nikki present        RADIOLOGY & ADDITIONAL TESTS:  < from: Xray Chest 1 View- PORTABLE-Routine (01.17.20 @ 12:08) >  Impression:      Calcified fibrothorax.    Improved basilar opacifications.    < end of copied text >      < from: Xray Shoulder 2 Views, Left (01.17.20 @ 12:08) >  Impression:    No acute osseous abnormality.    Mild osteoarthritis.    < end of copied text >

## 2020-01-20 NOTE — DISCHARGE NOTE PROVIDER - CARE PROVIDER_API CALL
Jt Price)  Critical Care Medicine; Pulmonary Disease; Sleep Medicine  [address] [place] [address]  Phone: [phone]  Fax: [phone]  Follow Up Time: 1 month

## 2020-01-20 NOTE — PROGRESS NOTE ADULT - REASON FOR ADMISSION
PAIN
Shortness of breath

## 2020-01-20 NOTE — DISCHARGE NOTE PROVIDER - HOSPITAL COURSE
HPI:    74 F PMHx of PE/DVT on coumadin, Asthma/COPD, autoimmune hepatitis on prednisone 10mg, recent admission for LLE chronic wound s/p debridement still has visiting nurse for dressing care, HTN presents with worsening SOB. Patient states 6 days ago, she went to urgent care for SOB, was diagnosed with pneumonia and discharged with 40mg prednisone and  doxycycline. Patient admits to being compliant with medications however admits to worsening of symptoms - admits to SOB at rest if she speaks for long periods of time. Denies fevers/chills, cough, URI, n/v/d, abdominal pain, dysuria, worsening leg swelling. (13 Jan 2020 16:56)        Patient admitted for shortness of breath. Patient had bronchoscopy, no growth of organisms. ID saw patient, did not think it was pneumonia, so antibiotics were d/c. Pulm following, felt that bilateral interstitial opacities were inflammatory in etiology, started on Prednisone 60mg qd with instructions to follow up with Pulm as outpatient for repeat CT chest in 3-4 weeks. She was also found to have T4 and T10 compression fractures, most likely from chronic steroid use for autoimmune hepatitis. Pain management was consulted, placed on a regimen of meds that helped. She will be discharged to SNF.

## 2020-01-20 NOTE — PROGRESS NOTE ADULT - ASSESSMENT
Patient is a 74y old Female who presents with a chief complaint of Shortness of breath.      # Cough and worsening Dyspnea- improving  -Per ID no PNA, does not require abx  -More looks like worsening interstitial lung disease.   -pt non toxic appearing saturating well on RA. Flu panel neg.  -Ground glass opacity on CT.    -MRSA swab positive. s/p Mupirocin 5 days  - cytomegalovirus titers- IgM +  - procalcitonin level normal  - Follow up bronchoscopy BAL results including cell count and cultures- normal  - Urine Legionella and strep Ag- pending  - Per pulm, start Prednisone 60mg qd until seen in pulm clinic; repeat ct chest in 3-4 weeks    #Back pain, Left shoulder pain  - T4 and T10 compression fractures  - Pain management recs appreciated; will c/w current regimen as pain is adequately controlled  - f/u shoulder xray- no fracture, osteoarthritis    # LLE Wound infection   - continue daily dressing. out patient f/u with Dr. Kang     # HTN   -On norvasc and Lasix    # History of Autoimmune hepatitis   - on pred 10mg outpatient; now increased to 60mg    # Genetic hypercoagulable disorder (DVT and PE in the past)   - Coumadin 4mg, check daily INR.     # DVT prophylaxis  -On Coumadin    #Pending: Pending SNF

## 2020-01-20 NOTE — PROGRESS NOTE ADULT - ATTENDING COMMENTS
Seen and examined with the pulmonary fellow at the bed side.  Impression and plan as outlined above.
Seen and examined with the pulmonary fellow at the bed side.  Impression and plan as outlined above.

## 2020-01-20 NOTE — DISCHARGE NOTE PROVIDER - NSDCMRMEDTOKEN_GEN_ALL_CORE_FT
amLODIPine 5 mg oral tablet: 1 tab(s) orally once a day  budesonide 3 mg oral capsule, extended release: 1 cap(s) orally 2 times a day  ferrous sulfate 325 mg (65 mg elemental iron) oral delayed release tablet: 1 tab(s) orally once a day  furosemide 20 mg oral tablet: 1 tab(s) orally once a day  gabapentin 300 mg oral capsule: 1 cap(s) orally 3 times a day  Metoprolol Tartrate 50 mg oral tablet: 1 tab(s) orally once a day  montelukast 10 mg oral tablet: 1 tab(s) orally once a day (at bedtime)  pantoprazole 40 mg oral delayed release tablet: 1 tab(s) orally once a day (before a meal)  predniSONE 10 mg oral tablet: 1 tab(s) orally once a day  risedronate 150 mg oral tablet: 1 tab(s) orally once a month  senna oral tablet: 2 tab(s) orally once a day (at bedtime), As Needed -for constipation   tacrolimus 0.5 mg oral capsule: 1 cap(s) orally every 12 hours  Vitamin B12 1000 mcg oral tablet: 1 tab(s) orally once a day  Vitamin C 1000 mg oral tablet: 1 tab(s) orally once a day  warfarin 2 mg oral tablet: 1 tab(s) orally once a day

## 2020-01-20 NOTE — PROGRESS NOTE ADULT - ASSESSMENT
IMPRESSION:    Bilateral interstitial opacities likely inflammatory in etiology  No DAH  HO autoimmune hepatitis    RECOMMEND:    Start Prednisone 60mg qd until seen in pulm clinic                         Nebs prn  May resume Coumadin  HOB >45  OOb to chair  Follow up with Pulm as outpatient for repeat CT chest in 3-4 weeks  Can dc home from pulm standpoint IMPRESSION:    Bilateral interstitial opacities likely inflammatory   No DAH  HO autoimmune hepatitis    RECOMMEND:    Start Prednisone 60mg qd fro 1 week and then 40 mg daily until seen in pulm clinic                         Nebs prn  May resume Coumadin  HOB >45  OOb to chair  Follow up with Pulm as outpatient for repeat CT chest in 3-4 weeks  Can dc home from pulm standpoint

## 2020-01-31 NOTE — ED ADULT NURSE NOTE - OBJECTIVE STATEMENT
Patient BIBA from [facility] for difficulty breathing and hemoptysis. Patient was treated for pneumonia 2 weeks ago with antibiotics. Patient brought in on 15L via NRB and immediately switched to BIPAP. Pulse ox 73 upon arrival. Patient has hx of COPD, asthma, and factor 5 deficiency. Denies c.p at this time.

## 2020-01-31 NOTE — H&P ADULT - HISTORY OF PRESENT ILLNESS
75y/o F w/ hx of asthma, copd (pulm camila), autoimmune hepatitis, Genetic hypercoagulable disorder (DVT and PE in the past) on warfarin (although has stopped doses for the last 5 days because supratheraupetic concern) with recent pneumonia diagnosis. presents for worsening sob, and hemoptysis that started yesterday.  pt feeling chills. no fevers taken at home. pt was on antibiotics since discharge 1/20.  leg swelling at baseline.  no travel hx. Seen in the office 2 days ago when she was wheezing. Previous admission, was bronched to rule out ILD, was also on antibiotics althrough the cultures were -ve. Unable to quantify hemoptysis. Her coumadin was on hold as her INR was elevated for last 5 dyas. 75y/o F w/ hx of asthma, COPD not on home O2, autoimmune hepatitis on prednisone and tacrolimus, heterozygous prothrombin gene mutation with hx of PE and DVT on coumadin with recent hospitalization in January 2020 with a diagnosis of pneumonia presents for worsening SOB, hemoptysis and cough. Everything started yesterday night when patient was in bed, started to feel shortness of breath and had many episodes of hemoptysis with clots, she also had substernal chest pain non radiating, moderate in intensity that worsens on coughing. She denies fever but endorses chills. She also admits for lightheadedness that occurred yesterday, no HA, abd pain, dysuria or paresthesias. She had 2 loose bowel movements since yesterday with some blood in the stools that she attributes to her hemorrhoids. She stopped Coumadin couple of days ago since her INR was supratherapeutic. She is from [facility] short term.  In ED, temp was 100.1 rectally, tachycardic ( sinus) , she was saturating to 70s on non rebreather and her SaO2 went up to 95%. ABG showing respiratory alkalosis initially and then corrected after BIPAP placement and correction of RR.  CTA chest showing no PE but showing interval development of multifocal bilateral groundglass opacities as well as new moderate to severe bronchiectasis in the right lung. Findings are concerning for an acute infectious/inflammatory process. 75y/o F w/ hx of asthma, COPD not on home O2, autoimmune hepatitis on prednisone and tacrolimus, heterozygous prothrombin gene mutation with hx of PE and DVT on coumadin with recent hospitalization in January 2020 with a diagnosis of pneumonia presents for worsening SOB, hemoptysis and cough. Everything started yesterday night when patient was in bed, started to feel shortness of breath and had many episodes of hemoptysis with clots, she also had substernal chest pain non radiating, moderate in intensity that worsens on coughing. She denies fever but endorses chills. She also admits for lightheadedness that occurred yesterday, no HA, abd pain, dysuria or paresthesias. She had 2 loose bowel movements since yesterday with some blood in the stools that she attributes to her hemorrhoids. She stopped Coumadin couple of days ago since her INR was supratherapeutic. She is from [facility] short term and also mentions that her  and another family member have the flu and she was exposed to them. She did not have the flu shot this season.  In ED, temp was 100.1 rectally, tachycardic ( sinus) , she was saturating to 70s on non rebreather and her SaO2 went up to 95%. ABG showing respiratory alkalosis initially and then corrected after BIPAP placement and correction of RR.  CTA chest showing no PE but showing interval development of multifocal bilateral groundglass opacities as well as new moderate to severe bronchiectasis in the right lung. Findings are concerning for an acute infectious/inflammatory process.

## 2020-01-31 NOTE — ED PROVIDER NOTE - PROGRESS NOTE DETAILS
pt came in sat 78 on nonrebreather at 8L pt immediately placed on bipap; now sat at 96. bedside ultrasound does not show any r. heart strain.  pt w/ rectal temp of 100.1.  pt tachy at 140s.  pt with history of "clotting problem" on warfarin, but has stopped her doses for 5 days for concern that supratheraupetic.  PE v. infection. ICU notified

## 2020-01-31 NOTE — H&P ADULT - NSHPPHYSICALEXAM_GEN_ALL_CORE
GEN: No acute distress  LUNGS: Clear to auscultation bilaterally   HEART: S1/S2 present. RRR.   ABD: Soft, non-tender, non-distended. Bowel sounds present  EXT: NC/NC/NE/2+PP/ARIAS/Skin Intact.   NEURO: AAOX3    Intravenous access: Peripheral access  NG tube: None  Hogan Catheter: None GEN: No acute distress, NC/AT, anicteric, on BIPAP  HEENT: no cervical LAD, no thyromegaly  LUNGS: Coarse crackles bilaterally, more pronounced on the right side. No wheezing  HEART: S1/S2 present. RRR. No murmurs appreciated  ABD: Soft, non-tender, non-distended. Bowel sounds present. small hematoma from subq injection  EXT: LE 1+ edema, xerosis with skin lesions, tender to palpation  SKIN: Scattered ecchymoses and purpura on both arms.  Yellow papules on arms and chest.  NEURO: AAOX3, moves all extremities, no focal deficits    Intravenous access: Peripheral access  NG tube: None  Hogan Catheter: None

## 2020-01-31 NOTE — ED ADULT NURSE REASSESSMENT NOTE - NS ED NURSE REASSESS COMMENT FT1
pt c/o nausea, Zofran given as per ED attending recommendation. Redness noted to the left upper extremity during vanco infusion. Infusion stopped, IV access was removed. Cold compresses applied as per pharmacist Sharee's recommendation. ED attending Onofre made aware and assess the pt. Pt aware of the plan of care and family at bedside and family.  made aware of the incident.

## 2020-01-31 NOTE — CONSULT NOTE ADULT - SUBJECTIVE AND OBJECTIVE BOX
Patient is a 74y old  Female who presents with a chief complaint of     HPI:  75y/o F w/ hx of asthma, copd (pulm chaloub), autoimmune hepatitis, Genetic hypercoagulable disorder (DVT and PE in the past) on warfarin (although has stopped doses for the last 5 days because supratheraupetic concern) with recent pneumonia diagnosis. presents for worsening sob, and hemoptysis that started yesterday.  pt feeling chills. no fevers taken at home. pt was on antibiotics since discharge 1/20.  leg swelling at baseline.  no travel hx. Seen in the office 2 days ago when she was wheezing. Previous admission, was bronched to rule out ILD, was also on antibiotics althrough the cultures were -ve. Unable to quantify hemoptysis. Her coumadin was on hold as her INR was elevated for last 5 dyas.     PAST MEDICAL & SURGICAL HISTORY:  Autoimmune hepatitis  Other chronic pulmonary embolism without acute cor pulmonale  Essential hypertension  Autoimmune hepatitis treated with steroids  Asthma  DVT (deep venous thrombosis)  S/P debridement  History of back surgery      SOCIAL HX:   Smoking     30 PY smoking, quit 30 yeas ago                     ETOH                            Other    FAMILY HISTORY:  No pertinent family history in first degree relatives      Review of system:  See HPI    Allergies    No Known Allergies    Intolerances          PHYSICAL EXAM    ICU Vital Signs Last 24 Hrs  T(C): 37.8 (31 Jan 2020 09:28), Max: 37.8 (31 Jan 2020 09:11)  T(F): 100.1 (31 Jan 2020 09:28), Max: 100.1 (31 Jan 2020 09:11)  HR: 104 (31 Jan 2020 13:51) (104 - 135)  BP: 190/86 (31 Jan 2020 13:51) (131/63 - 190/86)  BP(mean): --  ABP: --  ABP(mean): --  RR: 20 (31 Jan 2020 13:51) (20 - 30)  SpO2: 78% (31 Jan 2020 09:28) (78% - 96%)    I&O's Detail      General: Comfortable in bed on bipap  HEENT:  On NC  Lymph node: No palpable LN             Lungs: CTA. bilateral rales   Cardiovascular: RRR, S1S2  Abdomen: BS+ve; soft non tender  Extremities: No LE edema  Skin:  No evident Rash  Neurological:  AAOx3; No focal deficit      LABS:                          14.7   19.42 )-----------( 279      ( 31 Jan 2020 09:25 )             45.5                                               01-31    142  |  104  |  27<H>  ----------------------------<  231<H>  4.8   |  19  |  1.1    Ca    9.0      31 Jan 2020 09:25  Mg     1.8     01-31    TPro  6.6  /  Alb  3.2<L>  /  TBili  0.5  /  DBili  x   /  AST  83<H>  /  ALT  62<H>  /  AlkPhos  426<H>  01-31      PT/INR - ( 31 Jan 2020 09:25 )   PT: >40.00 sec;   INR: 3.55 ratio         PTT - ( 31 Jan 2020 09:25 )  PTT:36.7 sec                                           CARDIAC MARKERS ( 31 Jan 2020 09:25 )  x     / <0.01 ng/mL / x     / x     / x                                                LIVER FUNCTIONS - ( 31 Jan 2020 09:25 )  Alb: 3.2 g/dL / Pro: 6.6 g/dL / ALK PHOS: 426 U/L / ALT: 62 U/L / AST: 83 U/L / GGT: x                                                Serum Pro-Brain Natriuretic Peptide: 722 pg/mL (01-31-20 @ 09:25)      Lactate (01-31-20 @ 12:52): 1.8<H>  Lactate (01-31-20 @ 09:25): 2.9<H>                                                                                         ABG - ( 31 Jan 2020 09:19 )  pH, Arterial: 7.49  pH, Blood: x     /  pCO2: 30    /  pO2: 60    / HCO3: 23    / Base Excess: 0.2   /  SaO2: 92          MEDICATIONS  (STANDING):  influenza   Vaccine 0.5 milliLiter(s) IntraMuscular once  methylPREDNISolone sodium succinate IVPB 1000 milliGRAM(s) IV Intermittent Once  potassium chloride  20 mEq/100 mL IVPB 20 milliEquivalent(s) IV Intermittent once    MEDICATIONS  (PRN):      Radiology:    < from: CT Angio Chest w/ IV Cont (01.31.20 @ 10:59) >  IMPRESSION:     No CT evidence of central pulmonary embolus.    Interval development of multifocal bilateral groundglass opacities as well as new moderate to severe bronchiectasis in the right lung. Findings are concerning for an acute infectious/inflammatory process.     < end of copied text >

## 2020-01-31 NOTE — ED ADULT NURSE REASSESSMENT NOTE - NS ED NURSE REASSESS COMMENT FT1
Redness and swelling to left upper extremity noted to be worsening with continuos cold compresses. ED attending, resident, charge RN Eric, and  at bedside for re-assessment. Primary RN Kody returned, and made aware of the incident and to follow up on pt's care.

## 2020-01-31 NOTE — ED PROVIDER NOTE - CARE PLAN
Principal Discharge DX:	Pneumonia  Secondary Diagnosis:	Sepsis  Secondary Diagnosis:	Shortness of breath  Secondary Diagnosis:	Hemoptysis

## 2020-01-31 NOTE — ED PROVIDER NOTE - OBJECTIVE STATEMENT
75y/o F w/ hx of asthma, copd (pulm camila), autoimmune hepatitis, Genetic hypercoagulable disorder (DVT and PE in the past) on warfarin (although has stopped doses for the last 5 days because supratheraupetic concern) with recent pneumonia diagnosis. presents for worsening sob, and hemoptysis that started yesterday.  pt feeling chills. no fevers taken at home. pt was on antibiotics since discharge 1/20.  leg swelling at baseline.  no travel hx.

## 2020-01-31 NOTE — H&P ADULT - NSHPOUTPATIENTPROVIDERS_GEN_ALL_CORE
PCP: Tate Polanco  Pul: Albert  Hematology: Sanna PCP: Tate Polanco  Pulm: Albert  Hematology: Sanna

## 2020-01-31 NOTE — PATIENT PROFILE ADULT - NSPROGENBLOODRESTRICT_GEN_A_NUR
Patient Instructions by Mare Otoole NCMA at 11/02/17 11:38 AM     Author:  Mare Otoole NCMA Service:  (none) Author Type:  Medical Assistant     Filed:  11/02/17 11:38 AM Encounter Date:  11/2/2017 Status:  Signed     :  Mare Otoole NCMA (Medical Assistant)            PATIENT INFORMATION   We recommend the following:  Patient Instructions:   Cuidados para después de shoaib Biopsia de Colposcopia    Resultados de la Biopsia   Por favor llame a la oficina en shoaib semana por oral resultados. Violetta resultado le ayudara a tinajero doctor determinar si más tratamiento es necesario.     Sangrado/Desecho   Después de la biopsia el área es tratada con shoaib solución café para ayudar a parar cualquier sangrado. Por esta razón, usted podria notar flujo café o torres que durara por varios días. Si experimenta sangrado jeniffer y day debe de llamar a nuestra oficina.    Restricciones   Debe evitar ducharse, relaciones sexuales, o tampones por shoaib semana después de tinajero procediemiento.     Dolor  Si esta incomoda después del procedimento puede jaspal Tylenol o Ibuprofena para tinajero incomodidad.  I  Si tiene preguntas adicionales, por favor comuníquese a neuestra oficina al [phone] y pida hablar con shoaib enfermera.      í    None    Please follow up:  Make an appointment for 1 year(s). Return for annual Gyn exam in one year or earlier with any additional concerns.    Please feel free to contact our office at [phone] with any additional concerns.    Thank you for allowing us to serve you today!     Additional Educational Resources:  For additional resources regarding your symptoms, diagnosis, or further health information, please visit the Health Resources section on [email or web address] or the Online Health Resources section in [employer].          Revision History        User Key Date/Time User Provider Type Action    > [N/A] 11/02/17 11:38 AM Mare Otoole NCMA Medical Assistant Sign             none

## 2020-01-31 NOTE — ED PROVIDER NOTE - CRITICAL CARE PROVIDED
and son bedside/additional history taking/consult w/ pt's family directly relating to pts condition/documentation/direct patient care (not related to procedure)/interpretation of diagnostic studies/consultation with other physicians

## 2020-01-31 NOTE — H&P ADULT - NSHPLABSRESULTS_GEN_ALL_CORE
14.7   19.42 )-----------( 279      ( 31 Jan 2020 09:25 )             45.5       01-31    142  |  104  |  27<H>  ----------------------------<  231<H>  4.8   |  19  |  1.1    Ca    9.0      31 Jan 2020 09:25  Mg     1.8     01-31    TPro  6.6  /  Alb  3.2<L>  /  TBili  0.5  /  DBili  x   /  AST  83<H>  /  ALT  62<H>  /  AlkPhos  426<H>  01-31          ABG - ( 31 Jan 2020 09:19 )  pH, Arterial: 7.49  pH, Blood: x     /  pCO2: 30    /  pO2: 60    / HCO3: 23    / Base Excess: 0.2   /  SaO2: 92                      PT/INR - ( 31 Jan 2020 09:25 )   PT: >40.00 sec;   INR: 3.55 ratio         PTT - ( 31 Jan 2020 09:25 )  PTT:36.7 sec    Lactate Trend  01-31 @ 09:25 Lactate:2.9       CARDIAC MARKERS ( 31 Jan 2020 09:25 )  x     / <0.01 ng/mL / x     / x     / x            CAPILLARY BLOOD GLUCOSE

## 2020-01-31 NOTE — H&P ADULT - ASSESSMENT
75 y/o female with pmhx of asthma, HTN,  autoimmune hepatitis, heterozygous prothrombin gene mutation with hx of PE and DVT on coumadin admitted for SOB     IMPRESSION:    Acute hypoxic resp failure - inflammatory(++) vs infectious( less likely)   Autoimmune hepatitis   Recent CMV infection  h/o hypercoagulable state/ vte on coumadin     # Acute hypoxic resp failure - inflammatory(++) vs infectious( less likely):  - CT scan chest showing interval development of multifocal bilateral ground-glass opacities as well as new moderate to severe bronchiectasis in the right lung. Findings are concerning for an acute infectious/inflammatory process.   - Maintain low threshold for intubation:  -      # Leukocytosis due to infectious vs inflammatory process:  - WBC 19,000 with a fever, neutrophil predominance  - F/up cultures  - C/w vancomycin and cefepime  - F/up ID consult    # Recent CMV infection:  - In January 2017, CMV sent because patient is immunosuppressed treated with supportive therapy  - F/up ID recs    # HTN:  -monitor BP  # Autoimmune hepatitis:  - on pred 10mg,   # heterozygous prothrombin gene mutation with hx of PE and DVT on coumadin:  - holding coumadin for supratherapeutic INR  - No DVT ppx for now  - Resume coumadin once INR <3  # Hx of asthma -continue home medications    # GI PPx: Protonix 40 mg PO qd  # DVT PPx: None for now  # Activity: Ambulate as tolerated  # Dispo: ICU for now  # Code Status: FULL 75 y/o female with pmhx of asthma, HTN,  autoimmune hepatitis, heterozygous prothrombin gene mutation with hx of PE and DVT on coumadin admitted for SOB     IMPRESSION:    Acute hypoxic resp failure - inflammatory(++) vs infectious( less likely)   Autoimmune hepatitis   Recent CMV infection  h/o hypercoagulable state/ vte on coumadin     # Acute hypoxic resp failure - inflammatory(++) vs infectious( less likely):  - CT scan chest showing interval development of multifocal bilateral ground-glass opacities as well as new moderate to severe bronchiectasis in the right lung. Findings are concerning for an acute infectious/inflammatory process.   - Maintain low threshold for intubation, patient comfortable on BIPAP  - C/w Solu-medrol 1g qd  - C/w Vanc 750 mg q12h IV and Cefepime 2g q12h IV ; f/up ID recs ; F/up cultures  - C/w Duoneb q6h and q4h PRN  - BIpap ON/OFF and PRN  - F/up autoimmune panel ( TEE, RF, GBM, ANCA, IgA endomysial ab, APS panel)  - F/up ESR, CRP  - Tylenol for fever, oxycodone for severe pain  - F/up RVP    # Leukocytosis due to infectious vs inflammatory process:  - WBC 19,000 with a fever, neutrophil predominance  - F/up cultures  - C/w vancomycin and cefepime  - F/up ID consult    # Autoimmune hepatitis:  - On prednisone 60 mg PO qd at home  - c/w solumedrol 1g qd   - C/w tacrolimus 0.5 q12h, f/up levels in am before dose    # Recent CMV infection in January 2020:  - In January 2017, CMV sent because patient is immunosuppressed treated with supportive therapy  - No need for further workup    # HTN:  -monitor BP  - C/w amlodipine 10 mg qd PO  - Patient on lopressor 50 mg PO qd ( to clarify that), holding now    # heterozygous prothrombin gene mutation with hx of PE and DVT on coumadin:  - holding coumadin for supratherapeutic INR  - No DVT ppx for now  - Resume coumadin once INR <3    # Hx of asthma:  - C/w montelukast qd    # GI PPx: Protonix 40 mg PO qd  # DVT PPx: None for now  # Activity: Ambulate as tolerated  # Dispo: ICU for now  # Code Status: FULL

## 2020-01-31 NOTE — ED PROVIDER NOTE - NS ED ROS FT
Constitutional: (-) fever  Eyes/ENT: (-) blurry vision, (-) epistaxis  Cardiovascular: (+ chest pain, (-) syncope  Respiratory: (+) cough, + shortness of breath  Gastrointestinal: (-) vomiting, (-) diarrhea  Musculoskeletal: (-) neck pain, (-) back pain, (-) joint pain  Integumentary: (-) rash, (-) edema  Neurological: (-) headache, (-) altered mental status  Psychiatric: (-) hallucinations  Allergic/Immunologic: (-) pruritus

## 2020-01-31 NOTE — H&P ADULT - NSHPSOCIALHISTORY_GEN_ALL_CORE
Denies history of Smoking, illicit drug use, or alcohol abuse. Former smoker, quit 25 years ago, no illicit drug use, or alcohol abuse.

## 2020-01-31 NOTE — ED PROVIDER NOTE - CLINICAL SUMMARY MEDICAL DECISION MAKING FREE TEXT BOX
I personally evaluated the patient. I reviewed the Resident’s or Physician Assistant’s note (as assigned above), and agree with the findings and plan except as documented in my note. patient started on full septic treatment, patient remains on bipap, + AAO x3. Patient admitted to the icu

## 2020-01-31 NOTE — ED PROVIDER NOTE - PHYSICAL EXAMINATION
CONSTITUTIONAL: + repository distress, + improving with bipap  SKIN: skin exam is warm and dry, no acute rash.  HEAD: Normocephalic; atraumatic.  EYES: PERRL, 3 mm bilateral, no nystagmus, EOM intact; conjunctiva and sclera clear.  ENT: + Pharyngeal erythema, no exudate, No nasal discharge; airway clear.  NECK: Supple; non tender. + full passive ROM in all directions. No JVD  CARD: S1, S2 normal; no murmurs, gallops, or rubs. Regular rate and rhythm. + Symmetric Strong Pulses  RESP: + bilateral wheezing, + right basilar rhonchi. Improving air movement bilaterally  ABD: soft; non-distended; non-tender. No Rebound, No Guarding, No signs of peritonitis, No CVA tenderness. No pulsatile abdominal mass. + Strong and Symmetric Pulses  EXT: Normal ROM. No clubbing, cyanosis or edema. Dp and Pt Pulses intact. Cap refill less than 3 seconds  NEURO: Alert, oriented, grossly unremarkable. No Focal deficits. GCS 15. NIH 0  PSYCH: Cooperative, appropriate.

## 2020-01-31 NOTE — CONSULT NOTE ADULT - ASSESSMENT
IMPRESSION:    Acute hypoxic resp failure - inflammatory vs infectious   Autoimmune hepatitis   h/o hypercoagulable state/ vte on coumadin     PLAN:    CNS: avoid sedatives     HEENT: Oral care, aspiration precautions     PULMONARY:  HOB @ 45 degrees, Bipap 4 on/off, SOlumedrol 1gm q daily, duonebs q 6 plus prn, keep sats > 90%, if intubated bronch, autoimmune w/u     CARDIOVASCULAR: map > 65     GI: GI prophylaxis. NPO while on bipap.     RENAL:  Follow up lytes.  Correct as needed    INFECTIOUS DISEASE: Follow up cultures, vanco/ cefepime, send urine legionella and urine strep    HEMATOLOGICAL:  DVT prophylaxis.    ENDOCRINE:  Follow up FS.  Insulin protocol if needed.    MUSCULOSKELETAL: Bed rest     Lines: peripheral   Hogan: none   Code status: full   Prognosis: Guarded   Dispo: MICU IMPRESSION:    Acute hypoxic respiratory failure  DAH  HO Autoimmune hepatitis   h/o hypercoagulable state/ vte on coumadin     PLAN:    CNS: avoid sedatives     HEENT: Oral care, aspiration precautions     PULMONARY:  HOB @ 45 degrees, Bipap 4 on/off, Solumedrol 250 mg Q6, duonebs q 6 plus prn, keep sats > 92%.  Low threshold for intubation     CARDIOVASCULAR: I=O.  Avoid overload     GI: GI prophylaxis. NPO while on bipap.     RENAL:  Follow up lytes.  Correct as needed    INFECTIOUS DISEASE: Follow up cultures, vanco/ cefepime, Legionella.  send urine legionella and urine strep Ag.  Procalcitonin     HEMATOLOGICAL:  DVT prophylaxis seq.  LE Duplex.  FU CBC and Coags.  Correct as needed     ENDOCRINE:  Follow up FS.  Insulin protocol if needed.    MUSCULOSKELETAL: Bed rest     Lines: peripheral   Hogan: none   Code status: full   Prognosis: Guarded   Dispo: MICU monitoring for now

## 2020-01-31 NOTE — PATIENT PROFILE ADULT - SAFE PLACE TO LIVE
Office Policies    Phone calls/patient messages:            Please allow up to 24 hours for someone in the office to contact you about your call or message. Be mindful your provider may be out of the office or your message may require further review. We encourage you to use [employer] for your messages as this is a faster, more efficient way to communicate with our office                         Medication Refills:            Prescription medications require 48-72 business hours to process. We encourage you to use [employer] for your refills. For controlled medications: Please allow 72 business hours to process. Certain medications may require you to  a written prescription at our office. NO narcotic/controlled medications will be prescribed after 4pm Monday through Friday or on weekends              Form/Paperwork Completion:            Please note a $25 fee may incur for all paperwork for completed by our providers. We ask that you allow 7-10 business days. Pre-payment is due prior to picking up/faxing the completed form. You may also download your forms to [employer] to have your doctor print off. no

## 2020-01-31 NOTE — ED PROVIDER NOTE - ATTENDING CONTRIBUTION TO CARE
74 year old female, pmhx of autoimmune hepatitis, comes in with sob, + hyoxia, requiring immediate bipap placement, patient sent for ct chest, no pe, + worsening opacities. Code sepsis initiated immediately upon arrival, patient had fluid bolus administered slowly due to B line son bedside US, no obvious signs of right heart strain. Patient started on antibiotics    CONSTITUTIONAL: + repository distress, + improving with bipap  SKIN: skin exam is warm and dry, no acute rash.  HEAD: Normocephalic; atraumatic.  EYES: PERRL, 3 mm bilateral, no nystagmus, EOM intact; conjunctiva and sclera clear.  ENT: + Pharyngeal erythema, no exudate, No nasal discharge; airway clear.  NECK: Supple; non tender. + full passive ROM in all directions. No JVD  CARD: S1, S2 normal; no murmurs, gallops, or rubs. Regular rate and rhythm. + Symmetric Strong Pulses  RESP: + bilateral wheezing, + right basilar rhonchi. Improving air movement bilaterally  ABD: soft; non-distended; non-tender. No Rebound, No Guarding, No signs of peritonitis, No CVA tenderness. No pulsatile abdominal mass. + Strong and Symmetric Pulses  EXT: Normal ROM. No clubbing, cyanosis or edema. Dp and Pt Pulses intact. Cap refill less than 3 seconds  NEURO: Alert, oriented, grossly unremarkable. No Focal deficits. GCS 15. NIH 0  PSYCH: Cooperative, appropriate.

## 2020-01-31 NOTE — H&P ADULT - NSICDXPASTMEDICALHX_GEN_ALL_CORE_FT
PAST MEDICAL HISTORY:  Asthma     Autoimmune hepatitis     Autoimmune hepatitis treated with steroids     DVT (deep venous thrombosis)     Essential hypertension     Other chronic pulmonary embolism without acute cor pulmonale     Prothrombin gene mutation

## 2020-01-31 NOTE — ED ADULT NURSE REASSESSMENT NOTE - NS ED NURSE REASSESS COMMENT FT1
Received pt from [facility]. pt is awake A&Ox3 with family members at bed side. pt is on BIPAP with 99% O2 Sat. pt denies SOB, or chest pain at this time. pt noted to be Tachycardiac, as per primary RN pt has been Tachy since arrival. IV fluid and Vanco infusing. Awaiting for IV infusion bump to start Potassium infusion. As per primary RN report, IV pumps were ordered and awaiting to receive. pt aware of the plan of care and has no questions or concerns at this time. Safety maintained, will continue to monitor.

## 2020-02-01 NOTE — PROGRESS NOTE ADULT - ASSESSMENT
IMPRESSION:    Acute hypoxic respiratory failure  DAH  HO Autoimmune hepatitis   h/o hypercoagulable state/ vte on coumadin     PLAN:    CNS: avoid sedatives     HEENT: Oral care, aspiration precautions     PULMONARY:  HOB @ 45 degrees, will need intubation given worsening ABGs. discussed with the patient and family at bedside.    CARDIOVASCULAR: I=O.  Avoid overload     GI: GI prophylaxis. NPO while on bipap.     RENAL:  Follow up lytes.  Correct as needed    INFECTIOUS DISEASE: Follow up cultures, continue vanco/ cefepime/ tamiflu.  f/u urine legionella and urine strep Ag.  Procalcitonin     HEMATOLOGICAL:  DVT prophylaxis seq. dvt ppx once INR <2. check  LE Duplex.  FU CBC and Coags.  Correct as needed (hold coumadin)    ENDOCRINE:  Follow up FS.  Insulin protocol if needed.    MUSCULOSKELETAL: Bed rest     Lines: peripheral   Hogan: none   Code status: full   Prognosis: Guarded   Continue MICU monitoring for now. IMPRESSION:    Acute hypoxic respiratory failure/ FLU   DAH doubt  HO Autoimmune hepatitis   h/o hypercoagulable state/ vte on coumadin     PLAN:    CNS: avoid sedatives     HEENT: Oral care, aspiration precautions     PULMONARY:  HOB @ 45 degrees, will need intubation. discussed with the patient and family at bedside. Keep steroids    CARDIOVASCULAR: I=O.  Avoid overload     GI: GI prophylaxis. NPO while on bipap.     RENAL:  Follow up lytes.  Correct as needed    INFECTIOUS DISEASE: Follow up cultures, continue vanco/ cefepime/ tamiflu.  f/u urine legionella and urine strep Ag.  Procalcitonin / fungitell    HEMATOLOGICAL:  DVT prophylaxis seq. dvt ppx once INR <2. check  LE Duplex.  FU CBC and Coags.  Correct as needed (hold coumadin)    ENDOCRINE:  Follow up FS.  Insulin protocol if needed.    MUSCULOSKELETAL: Bed rest     Lines: peripheral   Hogan: none   Code status: full   Prognosis: Guarded   Continue MICU monitoring for now.

## 2020-02-01 NOTE — CONSULT NOTE ADULT - SUBJECTIVE AND OBJECTIVE BOX
ELDERDONI  74y, Female  Allergy: No Known Allergies      CHIEF COMPLAINT: SOB and desaturation (31 Jan 2020 14:36)      HPI:  75y/o F w/ hx of asthma, COPD not on home O2, autoimmune hepatitis on prednisone and tacrolimus, heterozygous prothrombin gene mutation with hx of PE and DVT on coumadin with recent hospitalization in January 2020 with a diagnosis of pneumonia presents for worsening SOB, hemoptysis and cough. Everything started yesterday night when patient was in bed, started to feel shortness of breath and had many episodes of hemoptysis with clots, she also had substernal chest pain non radiating, moderate in intensity that worsens on coughing. She denies fever but endorses chills. She also admits for lightheadedness that occurred yesterday, no HA, abd pain, dysuria or paresthesias. She had 2 loose bowel movements since yesterday with some blood in the stools that she attributes to her hemorrhoids. She stopped Coumadin couple of days ago since her INR was supratherapeutic. She is from [facility] short term and also mentions that her  and another family member have the flu and she was exposed to them. She did not have the flu shot this season.  In ED, temp was 100.1 rectally, tachycardic ( sinus) , she was saturating to 70s on non rebreather and her SaO2 went up to 95%. ABG showing respiratory alkalosis initially and then corrected after BIPAP placement and correction of RR.  CTA chest showing no PE but showing interval development of multifocal bilateral groundglass opacities as well as new moderate to severe bronchiectasis in the right lung. Findings are concerning for an acute infectious/inflammatory process. (31 Jan 2020 14:36)      INFECTIOUS DISEASE HISTORY:  During last hospitalization, CXR/CT without evidence of PNA. s/p bronch cx bacterial NG, +yeast, pending ID, neg legionella, + MRSA  Superficial ulcer LLE laterally with no abscess/cellulitis 12/7 WCX MSSA, Kleb, bacteroides    PAST MEDICAL & SURGICAL HISTORY:  Prothrombin gene mutation  Autoimmune hepatitis  Other chronic pulmonary embolism without acute cor pulmonale  Essential hypertension  Autoimmune hepatitis treated with steroids  Asthma  DVT (deep venous thrombosis)  S/P debridement  History of back surgery      FAMILY HISTORY  No pertinent family history in first degree relatives      SOCIAL HISTORY    Social History (marital status, living situation, occupation, tobacco use, alcohol and drug use, and sexual history): Former smoker, quit 25 years ago, no illicit drug use, or alcohol abuse.    ROS  General: Denies rigors, nightsweats  HEENT: Denies headache, rhinorrhea, sore throat, eye pain  CV: Denies CP, palpitations  PULM: Denies wheezing, hemoptysis  GI: Denies hematemesis, hematochezia, melena  : Denies discharge, hematuria  MSK: Denies arthralgias, myalgias  SKIN: Denies rash, lesions  NEURO: Denies paresthesias, weakness  PSYCH: Denies depression, anxiety    VITALS:  T(F): 98.6, Max: 100.1 (01-31-20 @ 09:11)  HR: 100  BP: 175/99  RR: 46Vital Signs Last 24 Hrs  T(C): 37 (01 Feb 2020 04:00), Max: 37.8 (31 Jan 2020 09:11)  T(F): 98.6 (01 Feb 2020 04:00), Max: 100.1 (31 Jan 2020 09:11)  HR: 100 (01 Feb 2020 06:00) (90 - 135)  BP: 175/99 (01 Feb 2020 06:00) (131/63 - 190/86)  BP(mean): 133 (01 Feb 2020 06:00) (104 - 134)  RR: 46 (01 Feb 2020 06:00) (20 - 52)  SpO2: 91% (01 Feb 2020 06:00) (78% - 96%)    PHYSICAL EXAM:  Gen: NAD, resting in bed  HEENT: Normocephalic, atraumatic  Neck: supple, no lymphadenopathy  CV: Regular rate & regular rhythm  Lungs: decreased BS at bases, no fremitus  Abdomen: Soft, BS present  Ext: Warm, well perfused  Neuro: non focal, awake  Skin: no rash, no erythema  Lines: no phlebitis    TESTS & MEASUREMENTS:                        14.7   16.30 )-----------( 253      ( 01 Feb 2020 04:46 )             45.1     02-01    146  |  104  |  21<H>  ----------------------------<  54<L>  3.4<L>   |  24  |  1.1    Ca    8.6      01 Feb 2020 04:46  Mg     1.9     02-01    TPro  6.0  /  Alb  3.4<L>  /  TBili  0.4  /  DBili  x   /  AST  46<H>  /  ALT  52<H>  /  AlkPhos  364<H>  02-01    eGFR if Non African American: 49 mL/min/1.73M2 (02-01-20 @ 04:46)  eGFR if African American: 57 mL/min/1.73M2 (02-01-20 @ 04:46)  eGFR if Non African American: 49 mL/min/1.73M2 (01-31-20 @ 09:25)  eGFR if : 57 mL/min/1.73M2 (01-31-20 @ 09:25)    LIVER FUNCTIONS - ( 01 Feb 2020 04:46 )  Alb: 3.4 g/dL / Pro: 6.0 g/dL / ALK PHOS: 364 U/L / ALT: 52 U/L / AST: 46 U/L / GGT: x               Culture - Fungal, Bronchial (collected 01-16-20 @ 07:45)  Source: .Bronchial None  Preliminary Report (01-21-20 @ 09:19):    Few Yeast    Culture - Acid Fast - Bronchial w/Smear (collected 01-16-20 @ 07:45)  Source: .Bronchial None  Preliminary Report (01-25-20 @ 15:04):    No growth at 1 week.    Culture - Bronchial (collected 01-16-20 @ 07:45)  Source: .Bronchial None  Gram Stain (01-17-20 @ 07:05):    Rare Squamous epithelial cells per low power field    Rare polymorphonuclear leukocytes per low power field    No organisms seen per oil power field  Final Report (01-18-20 @ 19:20):    Normal Respiratory Nikki present    Culture - Fungal, Bronchial (collected 01-16-20 @ 07:45)  Source: .Bronchial None  Preliminary Report (01-21-20 @ 09:12):    Few Yeast    Culture - Acid Fast - Bronchial w/Smear (collected 01-16-20 @ 07:45)  Source: .Bronchial None  Preliminary Report (01-25-20 @ 15:04):    No growth at 1 week.    Culture - Bronchial (collected 01-16-20 @ 07:45)  Source: .Bronchial None  Gram Stain (01-17-20 @ 07:34):    Rare polymorphonuclear leukocytes seen per low power field    No squamous epithelial cells seen per low power field    No organisms seen per oil power field  Final Report (01-18-20 @ 19:04):    Normal Respiratory Nikki present  Culture - Acid Fast - Other w/Smear (collected 07-09-19 @ 13:54)  Source: .Other None  Final Report (08-28-19 @ 15:04):    No acid fast bacilli isolated after 6 weeks.    Blood Gas Venous - Lactate: 1.8 mmoL/L (01-31-20 @ 12:52)  Lactate, Blood: 2.9 mmol/L (01-31-20 @ 09:25)      INFECTIOUS DISEASES TESTING  Legionella Antigen, Urine: Negative (01-20-20 @ 02:50)  MRSA PCR Result.: Positive (01-14-20 @ 13:59)  MRSA PCR Result.: Negative (08-13-19 @ 08:46)  Hepatitis C Virus Interpretation: Nonreact (07-02-19 @ 04:30)      RADIOLOGY & ADDITIONAL TESTS:  I have personally reviewed the last Chest xray  CXR      CT  CT Angio Chest w/ IV Cont:   EXAM:  CT ANGIO CHEST (W)AW IC            PROCEDURE DATE:  01/31/2020            INTERPRETATION:  CLINICAL STATEMENT: Evaluation for pulmonary embolism. Chest pain.    TECHNIQUE: Multislice helical sections were obtained from the thoracic inlet to the lung bases during rapid administration of 65 mL Optiray 320 intravenous contrast using a CTA protocol. Thin sections were reconstructed through the pulmonary vasculature. Coronal, sagittal and MIP reformatted images are also submitted.    COMPARISON CT: CTA chest 1/13/2020.    OTHER STUDIES USED FOR CORRELATION: None.    Examination limited secondary to contrast being delayed during image capture.      FINDINGS:    PULMONARY EMBOLUS: Evaluation is limited due to the phase of contrast. No evidence of a central pulmonary embolism.    LUNGS, PLEURA, AIRWAYS: Patent proximal tracheobronchial tree. Interval development of multifocal bilateral groundglass opacities right greater than left, as well as new moderate to severe bronchiectasis in the right lung. Bilateral posterior pleural calcifications are noted, unchanged. No pneumothorax.    THORACIC NODES: No mediastinal, hilar, supraclavicular or axillary lymphadenopathy.    MEDIASTINUM/GREAT VESSELS: The heart size. No pericardial effusion.Coronary artery and thoracic aortic calcification. Great vessels are normal caliber. Prominent pericardial fat.    VISUALIZED UPPER ABDOMEN: Small hiatal hernia.    BONES/SOFT TISSUES: Worsening compression fracture of T4. Chronic compression fracture of T10. Multilevel degenerative changes of the spine. Partially visualized spinal hardware in the lower thoracolumbar vertebra.      IMPRESSION:     No CT evidence of central pulmonary embolus.    Interval development of multifocal bilateral groundglass opacities as well as new moderate to severe bronchiectasis in the right lung. Findings are concerning for an acute infectious/inflammatory process.                   PARISH HANSEN M.D., RESIDENT RADIOLOGIST  This document has been electronically signed.  MAY HEARD M.D., ATTENDING RADIOLOGIST  This document has been electronically signed. Jan 31 2020 11:53AM             (01-31-20 @ 10:59)      CARDIOLOGY TESTING  12 Lead ECG:   Ventricular Rate 118 BPM    Atrial Rate 118 BPM    P-R Interval 150 ms    QRS Duration 80 ms    Q-T Interval 326 ms    QTC Calculation(Bezet) 456 ms    P Axis 41 degrees    R Axis -10 degrees    T Axis 11 degrees    Diagnosis Line Sinus tachycardia  Possible Left atrial enlargement  Inferior infarct , age undetermined  Abnormal ECG    Confirmed by MOHSEN SONG MD ([id number]) on 1/31/2020 12:38:55 PM (01-31-20 @ 11:06)      MEDICATIONS  albuterol/ipratropium for Nebulization 3  amLODIPine   Tablet 10  cefepime   IVPB 2000  chlorhexidine 4% Liquid 1  cyanocobalamin 1000  furosemide   Injectable 20  gabapentin 300  influenza   Vaccine 0.5  methylPREDNISolone sodium succinate IVPB 1000  montelukast 10  multivitamin/minerals 1  oseltamivir 75  pantoprazole    Tablet 40  tacrolimus 0.5  tiotropium 18 MICROgram(s) Capsule 1  vancomycin  IVPB 750      ANTIBIOTICS:  cefepime   IVPB 2000 milliGRAM(s) IV Intermittent every 12 hours  oseltamivir 75 milliGRAM(s) Oral two times a day  vancomycin  IVPB 750 milliGRAM(s) IV Intermittent every 12 hours      ALLERGIES:  No Known Allergies ELDERDONI  74y, Female  Allergy: No Known Allergies      CHIEF COMPLAINT: SOB and desaturation (31 Jan 2020 14:36)      HPI:  73y/o F w/ hx of asthma, COPD not on home O2, autoimmune hepatitis on prednisone and tacrolimus, heterozygous prothrombin gene mutation with hx of PE and DVT on coumadin with recent hospitalization in January 2020 with a diagnosis of pneumonia presents for worsening SOB, hemoptysis and cough. Everything started yesterday night when patient was in bed, started to feel shortness of breath and had many episodes of hemoptysis with clots, she also had substernal chest pain non radiating, moderate in intensity that worsens on coughing. She denies fever but endorses chills. She also admits for lightheadedness that occurred yesterday, no HA, abd pain, dysuria or paresthesias. She had 2 loose bowel movements since yesterday with some blood in the stools that she attributes to her hemorrhoids. She stopped Coumadin couple of days ago since her INR was supratherapeutic. She is from [facility] short term and also mentions that her  and another family member have the flu and she was exposed to them. She did not have the flu shot this season.  In ED, temp was 100.1 rectally, tachycardic ( sinus) , she was saturating to 70s on non rebreather and her SaO2 went up to 95%. ABG showing respiratory alkalosis initially and then corrected after BIPAP placement and correction of RR.  CTA chest showing no PE but showing interval development of multifocal bilateral groundglass opacities as well as new moderate to severe bronchiectasis in the right lung. Findings are concerning for an acute infectious/inflammatory process. (31 Jan 2020 14:36)      INFECTIOUS DISEASE HISTORY:  During last hospitalization, CXR/CT without evidence of PNA. s/p bronch cx bacterial NG, +yeast, pending ID, neg legionella, + MRSA  Superficial ulcer LLE laterally with no abscess/cellulitis 12/7 WCX MSSA, Kleb, bacteroides  Travel to [place], dogs at home. No sick contacts  Previous smoker    PAST MEDICAL & SURGICAL HISTORY:  Prothrombin gene mutation  Autoimmune hepatitis  Other chronic pulmonary embolism without acute cor pulmonale  Essential hypertension  Autoimmune hepatitis treated with steroids  Asthma  DVT (deep venous thrombosis)  S/P debridement  History of back surgery      FAMILY HISTORY  No pertinent family history in first degree relatives      SOCIAL HISTORY    Social History (marital status, living situation, occupation, tobacco use, alcohol and drug use, and sexual history): Former smoker, quit 25 years ago, no illicit drug use, or alcohol abuse.    ROS  General: Denies rigors, nightsweats  HEENT: Denies headache, rhinorrhea, sore throat, eye pain  CV: Denies CP, palpitations  PULM: as noted above   GI: Denies hematemesis, hematochezia, melena  : Denies discharge, hematuria  MSK: Denies arthralgias, myalgias  SKIN: Denies rash, lesions  NEURO: Denies paresthesias, weakness  PSYCH: Denies depression, anxiety    VITALS:  T(F): 98.6, Max: 100.1 (01-31-20 @ 09:11)  HR: 100  BP: 175/99  RR: 46Vital Signs Last 24 Hrs  T(C): 37 (01 Feb 2020 04:00), Max: 37.8 (31 Jan 2020 09:11)  T(F): 98.6 (01 Feb 2020 04:00), Max: 100.1 (31 Jan 2020 09:11)  HR: 100 (01 Feb 2020 06:00) (90 - 135)  BP: 175/99 (01 Feb 2020 06:00) (131/63 - 190/86)  BP(mean): 133 (01 Feb 2020 06:00) (104 - 134)  RR: 46 (01 Feb 2020 06:00) (20 - 52)  SpO2: 91% (01 Feb 2020 06:00) (78% - 96%)    PHYSICAL EXAM:  Gen: NAD on BIPAP  HEENT: Normocephalic, atraumatic  Neck: supple, no lymphadenopathy  CV: Regular rate & regular rhythm  Lungs: crackles  Abdomen: Soft, BS present  Ext: Warm, well perfused, LLE wound, clean   Neuro: non focal, awake  Skin: no rash, no erythema  Lines: no phlebitis    TESTS & MEASUREMENTS:                        14.7   16.30 )-----------( 253      ( 01 Feb 2020 04:46 )             45.1     02-01    146  |  104  |  21<H>  ----------------------------<  54<L>  3.4<L>   |  24  |  1.1    Ca    8.6      01 Feb 2020 04:46  Mg     1.9     02-01    TPro  6.0  /  Alb  3.4<L>  /  TBili  0.4  /  DBili  x   /  AST  46<H>  /  ALT  52<H>  /  AlkPhos  364<H>  02-01    eGFR if Non African American: 49 mL/min/1.73M2 (02-01-20 @ 04:46)  eGFR if African American: 57 mL/min/1.73M2 (02-01-20 @ 04:46)  eGFR if Non African American: 49 mL/min/1.73M2 (01-31-20 @ 09:25)  eGFR if : 57 mL/min/1.73M2 (01-31-20 @ 09:25)    LIVER FUNCTIONS - ( 01 Feb 2020 04:46 )  Alb: 3.4 g/dL / Pro: 6.0 g/dL / ALK PHOS: 364 U/L / ALT: 52 U/L / AST: 46 U/L / GGT: x               Culture - Fungal, Bronchial (collected 01-16-20 @ 07:45)  Source: .Bronchial None  Preliminary Report (01-21-20 @ 09:19):    Few Yeast    Culture - Acid Fast - Bronchial w/Smear (collected 01-16-20 @ 07:45)  Source: .Bronchial None  Preliminary Report (01-25-20 @ 15:04):    No growth at 1 week.    Culture - Bronchial (collected 01-16-20 @ 07:45)  Source: .Bronchial None  Gram Stain (01-17-20 @ 07:05):    Rare Squamous epithelial cells per low power field    Rare polymorphonuclear leukocytes per low power field    No organisms seen per oil power field  Final Report (01-18-20 @ 19:20):    Normal Respiratory Nikki present    Culture - Fungal, Bronchial (collected 01-16-20 @ 07:45)  Source: .Bronchial None  Preliminary Report (01-21-20 @ 09:12):    Few Yeast    Culture - Acid Fast - Bronchial w/Smear (collected 01-16-20 @ 07:45)  Source: .Bronchial None  Preliminary Report (01-25-20 @ 15:04):    No growth at 1 week.    Culture - Bronchial (collected 01-16-20 @ 07:45)  Source: .Bronchial None  Gram Stain (01-17-20 @ 07:34):    Rare polymorphonuclear leukocytes seen per low power field    No squamous epithelial cells seen per low power field    No organisms seen per oil power field  Final Report (01-18-20 @ 19:04):    Normal Respiratory Nikki present  Culture - Acid Fast - Other w/Smear (collected 07-09-19 @ 13:54)  Source: .Other None  Final Report (08-28-19 @ 15:04):    No acid fast bacilli isolated after 6 weeks.    Blood Gas Venous - Lactate: 1.8 mmoL/L (01-31-20 @ 12:52)  Lactate, Blood: 2.9 mmol/L (01-31-20 @ 09:25)      INFECTIOUS DISEASES TESTING  Legionella Antigen, Urine: Negative (01-20-20 @ 02:50)  MRSA PCR Result.: Positive (01-14-20 @ 13:59)  MRSA PCR Result.: Negative (08-13-19 @ 08:46)  Hepatitis C Virus Interpretation: Nonreact (07-02-19 @ 04:30)      RADIOLOGY & ADDITIONAL TESTS:  I have personally reviewed the last Chest xray  CXR      CT  CT Angio Chest w/ IV Cont:   EXAM:  CT ANGIO CHEST (W)AW IC            PROCEDURE DATE:  01/31/2020            INTERPRETATION:  CLINICAL STATEMENT: Evaluation for pulmonary embolism. Chest pain.    TECHNIQUE: Multislice helical sections were obtained from the thoracic inlet to the lung bases during rapid administration of 65 mL Optiray 320 intravenous contrast using a CTA protocol. Thin sections were reconstructed through the pulmonary vasculature. Coronal, sagittal and MIP reformatted images are also submitted.    COMPARISON CT: CTA chest 1/13/2020.    OTHER STUDIES USED FOR CORRELATION: None.    Examination limited secondary to contrast being delayed during image capture.      FINDINGS:    PULMONARY EMBOLUS: Evaluation is limited due to the phase of contrast. No evidence of a central pulmonary embolism.    LUNGS, PLEURA, AIRWAYS: Patent proximal tracheobronchial tree. Interval development of multifocal bilateral groundglass opacities right greater than left, as well as new moderate to severe bronchiectasis in the right lung. Bilateral posterior pleural calcifications are noted, unchanged. No pneumothorax.    THORACIC NODES: No mediastinal, hilar, supraclavicular or axillary lymphadenopathy.    MEDIASTINUM/GREAT VESSELS: The heart size. No pericardial effusion.Coronary artery and thoracic aortic calcification. Great vessels are normal caliber. Prominent pericardial fat.    VISUALIZED UPPER ABDOMEN: Small hiatal hernia.    BONES/SOFT TISSUES: Worsening compression fracture of T4. Chronic compression fracture of T10. Multilevel degenerative changes of the spine. Partially visualized spinal hardware in the lower thoracolumbar vertebra.      IMPRESSION:     No CT evidence of central pulmonary embolus.    Interval development of multifocal bilateral groundglass opacities as well as new moderate to severe bronchiectasis in the right lung. Findings are concerning for an acute infectious/inflammatory process.                   PARISH HANSEN M.D., RESIDENT RADIOLOGIST  This document has been electronically signed.  MAY HEARD M.D., ATTENDING RADIOLOGIST  This document has been electronically signed. Jan 31 2020 11:53AM             (01-31-20 @ 10:59)      CARDIOLOGY TESTING  12 Lead ECG:   Ventricular Rate 118 BPM    Atrial Rate 118 BPM    P-R Interval 150 ms    QRS Duration 80 ms    Q-T Interval 326 ms    QTC Calculation(Bezet) 456 ms    P Axis 41 degrees    R Axis -10 degrees    T Axis 11 degrees    Diagnosis Line Sinus tachycardia  Possible Left atrial enlargement  Inferior infarct , age undetermined  Abnormal ECG    Confirmed by MOHSEN SONG MD ([id number]) on 1/31/2020 12:38:55 PM (01-31-20 @ 11:06)      MEDICATIONS  albuterol/ipratropium for Nebulization 3  amLODIPine   Tablet 10  cefepime   IVPB 2000  chlorhexidine 4% Liquid 1  cyanocobalamin 1000  furosemide   Injectable 20  gabapentin 300  influenza   Vaccine 0.5  methylPREDNISolone sodium succinate IVPB 1000  montelukast 10  multivitamin/minerals 1  oseltamivir 75  pantoprazole    Tablet 40  tacrolimus 0.5  tiotropium 18 MICROgram(s) Capsule 1  vancomycin  IVPB 750      ANTIBIOTICS:  cefepime   IVPB 2000 milliGRAM(s) IV Intermittent every 12 hours  oseltamivir 75 milliGRAM(s) Oral two times a day  vancomycin  IVPB 750 milliGRAM(s) IV Intermittent every 12 hours      ALLERGIES:  No Known Allergies

## 2020-02-01 NOTE — PROCEDURE NOTE - NSICDXPROCEDURE_GEN_ALL_CORE_FT
PROCEDURES:  US Doppler guided insertion of central venous catheter 01-Feb-2020 13:47:37  Julio Carroll

## 2020-02-01 NOTE — PROGRESS NOTE ADULT - SUBJECTIVE AND OBJECTIVE BOX
Over Night Events:  remain on BIPAP 80% fio2, in distress.      ROS:  See HPI    PHYSICAL EXAM    ICU Vital Signs Last 24 Hrs  T(C): 37 (01 Feb 2020 04:00), Max: 37.3 (31 Jan 2020 18:00)  T(F): 98.6 (01 Feb 2020 04:00), Max: 99.2 (31 Jan 2020 18:00)  HR: 100 (01 Feb 2020 06:00) (90 - 124)  BP: 175/99 (01 Feb 2020 06:00) (131/63 - 190/86)  BP(mean): 133 (01 Feb 2020 06:00) (104 - 134)  ABP: --  ABP(mean): --  RR: 46 (01 Feb 2020 06:00) (20 - 52)  SpO2: 91% (01 Feb 2020 06:00) (88% - 93%)      General: in respiraotry distress  HEENT: LUIS A             Lungs: Bilateral Crackles, on NIV oronasal interface.  Cardiovascular: Regular   Abdomen: Soft, Positive BS  Extremities: No clubbing   Skin: Warm  Neurological: Non focal       01-31-20 @ 07:01  -  02-01-20 @ 07:00  --------------------------------------------------------  IN:    IV PiggyBack: 650 mL  Total IN: 650 mL    OUT:    Voided: 2900 mL  Total OUT: 2900 mL    Total NET: -2250 mL          LABS:                          14.7   16.30 )-----------( 253      ( 01 Feb 2020 04:46 )             45.1                                               02-01    146  |  104  |  21<H>  ----------------------------<  54<L>  3.4<L>   |  24  |  1.1    Ca    8.6      01 Feb 2020 04:46  Mg     1.9     02-01    TPro  6.0  /  Alb  3.4<L>  /  TBili  0.4  /  DBili  x   /  AST  46<H>  /  ALT  52<H>  /  AlkPhos  364<H>  02-01      PT/INR - ( 01 Feb 2020 04:46 )   PT: 33.80 sec;   INR: 2.94 ratio         PTT - ( 31 Jan 2020 09:25 )  PTT:36.7 sec                                           CARDIAC MARKERS ( 31 Jan 2020 09:25 )  x     / <0.01 ng/mL / x     / x     / x                                                LIVER FUNCTIONS - ( 01 Feb 2020 04:46 )  Alb: 3.4 g/dL / Pro: 6.0 g/dL / ALK PHOS: 364 U/L / ALT: 52 U/L / AST: 46 U/L / GGT: x                                                                                                                                   ABG - ( 01 Feb 2020 08:08 )  pH, Arterial: 7.55  pH, Blood: x     /  pCO2: 29    /  pO2: 58    / HCO3: 25    / Base Excess: 3.8   /  SaO2: 94                  MEDICATIONS  (STANDING):  albuterol/ipratropium for Nebulization 3 milliLiter(s) Nebulizer every 6 hours  amLODIPine   Tablet 10 milliGRAM(s) Oral at bedtime  cefepime   IVPB 2000 milliGRAM(s) IV Intermittent every 12 hours  chlorhexidine 4% Liquid 1 Application(s) Topical <User Schedule>  cyanocobalamin 1000 MICROGram(s) Oral daily  furosemide   Injectable 20 milliGRAM(s) IV Push daily  gabapentin 300 milliGRAM(s) Oral at bedtime  influenza   Vaccine 0.5 milliLiter(s) IntraMuscular once  methylPREDNISolone sodium succinate IVPB 1000 milliGRAM(s) IV Intermittent daily  montelukast 10 milliGRAM(s) Oral at bedtime  multivitamin/minerals 1 Tablet(s) Oral daily  ondansetron Injectable 4 milliGRAM(s) IV Push once  oseltamivir 75 milliGRAM(s) Oral two times a day  pantoprazole    Tablet 40 milliGRAM(s) Oral before breakfast  tacrolimus 0.5 milliGRAM(s) Oral every 12 hours  tiotropium 18 MICROgram(s) Capsule 1 Capsule(s) Inhalation daily  vancomycin  IVPB 750 milliGRAM(s) IV Intermittent every 12 hours    MEDICATIONS  (PRN):  albuterol/ipratropium for Nebulization 3 milliLiter(s) Nebulizer every 4 hours PRN Shortness of Breath and/or Wheezing  oxyCODONE    IR 10 milliGRAM(s) Oral every 6 hours PRN Severe Pain (7 - 10)      Xrays:                                                                                     ECHO Over Night Events:  remain on BIPAP 80% fio2, in distress. tachypneic      ROS:  See HPI    PHYSICAL EXAM    ICU Vital Signs Last 24 Hrs  T(C): 37 (01 Feb 2020 04:00), Max: 37.3 (31 Jan 2020 18:00)  T(F): 98.6 (01 Feb 2020 04:00), Max: 99.2 (31 Jan 2020 18:00)  HR: 100 (01 Feb 2020 06:00) (90 - 124)  BP: 175/99 (01 Feb 2020 06:00) (131/63 - 190/86)  BP(mean): 133 (01 Feb 2020 06:00) (104 - 134)  RR: 46 (01 Feb 2020 06:00) (20 - 52)  SpO2: 91% (01 Feb 2020 06:00) (88% - 93%)      General: in respiraotry distress  HEENT: LUIS A             Lungs: Bilateral Crackles, on NIV oronasal interface.  Cardiovascular: Regular   Abdomen: Soft, Positive BS  Extremities: No clubbing   Skin: Warm  Neurological: Non focal       01-31-20 @ 07:01  -  02-01-20 @ 07:00  --------------------------------------------------------  IN:    IV PiggyBack: 650 mL  Total IN: 650 mL    OUT:    Voided: 2900 mL  Total OUT: 2900 mL    Total NET: -2250 mL          LABS:                          14.7   16.30 )-----------( 253      ( 01 Feb 2020 04:46 )             45.1                                               02-01    146  |  104  |  21<H>  ----------------------------<  54<L>  3.4<L>   |  24  |  1.1    Ca    8.6      01 Feb 2020 04:46  Mg     1.9     02-01    TPro  6.0  /  Alb  3.4<L>  /  TBili  0.4  /  DBili  x   /  AST  46<H>  /  ALT  52<H>  /  AlkPhos  364<H>  02-01      PT/INR - ( 01 Feb 2020 04:46 )   PT: 33.80 sec;   INR: 2.94 ratio         PTT - ( 31 Jan 2020 09:25 )  PTT:36.7 sec                                           CARDIAC MARKERS ( 31 Jan 2020 09:25 )  x     / <0.01 ng/mL / x     / x     / x                                                LIVER FUNCTIONS - ( 01 Feb 2020 04:46 )  Alb: 3.4 g/dL / Pro: 6.0 g/dL / ALK PHOS: 364 U/L / ALT: 52 U/L / AST: 46 U/L / GGT: x                                                                                                                                   ABG - ( 01 Feb 2020 08:08 )  pH, Arterial: 7.55  pH, Blood: x     /  pCO2: 29    /  pO2: 58    / HCO3: 25    / Base Excess: 3.8   /  SaO2: 94                  MEDICATIONS  (STANDING):  albuterol/ipratropium for Nebulization 3 milliLiter(s) Nebulizer every 6 hours  amLODIPine   Tablet 10 milliGRAM(s) Oral at bedtime  cefepime   IVPB 2000 milliGRAM(s) IV Intermittent every 12 hours  chlorhexidine 4% Liquid 1 Application(s) Topical <User Schedule>  cyanocobalamin 1000 MICROGram(s) Oral daily  furosemide   Injectable 20 milliGRAM(s) IV Push daily  gabapentin 300 milliGRAM(s) Oral at bedtime  influenza   Vaccine 0.5 milliLiter(s) IntraMuscular once  methylPREDNISolone sodium succinate IVPB 1000 milliGRAM(s) IV Intermittent daily  montelukast 10 milliGRAM(s) Oral at bedtime  multivitamin/minerals 1 Tablet(s) Oral daily  ondansetron Injectable 4 milliGRAM(s) IV Push once  oseltamivir 75 milliGRAM(s) Oral two times a day  pantoprazole    Tablet 40 milliGRAM(s) Oral before breakfast  tacrolimus 0.5 milliGRAM(s) Oral every 12 hours  tiotropium 18 MICROgram(s) Capsule 1 Capsule(s) Inhalation daily  vancomycin  IVPB 750 milliGRAM(s) IV Intermittent every 12 hours    MEDICATIONS  (PRN):  albuterol/ipratropium for Nebulization 3 milliLiter(s) Nebulizer every 4 hours PRN Shortness of Breath and/or Wheezing  oxyCODONE    IR 10 milliGRAM(s) Oral every 6 hours PRN Severe Pain (7 - 10)      Xrays:  reviewed

## 2020-02-01 NOTE — PROGRESS NOTE ADULT - SUBJECTIVE AND OBJECTIVE BOX
24H events:    Patient is a 74y old Female who presents with a chief complaint of SOB and desaturation (01 Feb 2020 09:53)    Primary diagnosis of Pneumonia     Today is hospital day 1d. This morning patient was seen and examined at bedside, resting comfortably in bed.      PAST MEDICAL & SURGICAL HISTORY  Prothrombin gene mutation  Autoimmune hepatitis  Other chronic pulmonary embolism without acute cor pulmonale  Essential hypertension  Autoimmune hepatitis treated with steroids  Asthma  DVT (deep venous thrombosis)  S/P debridement  History of back surgery    SOCIAL HISTORY:  Social History:  Former smoker, quit 25 years ago, no illicit drug use, or alcohol abuse. (31 Jan 2020 14:36)      ALLERGIES:  No Known Allergies    MEDICATIONS:  STANDING MEDICATIONS  albuterol/ipratropium for Nebulization 3 milliLiter(s) Nebulizer every 6 hours  amLODIPine   Tablet 10 milliGRAM(s) Oral at bedtime  cefepime   IVPB 2000 milliGRAM(s) IV Intermittent every 12 hours  chlorhexidine 4% Liquid 1 Application(s) Topical <User Schedule>  cisatracurium Infusion 3 MICROgram(s)/kG/Min IV Continuous <Continuous>  cyanocobalamin 1000 MICROGram(s) Oral daily  dexMEDEtomidine Infusion 0.2 MICROgram(s)/kG/Hr IV Continuous <Continuous>  fentaNYL   Infusion 0.5 MICROgram(s)/kG/Hr IV Continuous <Continuous>  furosemide   Injectable 20 milliGRAM(s) IV Push daily  gabapentin 300 milliGRAM(s) Oral at bedtime  influenza   Vaccine 0.5 milliLiter(s) IntraMuscular once  methylPREDNISolone sodium succinate Injectable 60 milliGRAM(s) IV Push two times a day  montelukast 10 milliGRAM(s) Oral at bedtime  multivitamin/minerals 1 Tablet(s) Oral daily  norepinephrine Infusion 0.05 MICROgram(s)/kG/Min IV Continuous <Continuous>  oseltamivir 75 milliGRAM(s) Oral two times a day  pantoprazole    Tablet 40 milliGRAM(s) Oral before breakfast  propofol Infusion 10 MICROgram(s)/kG/Min IV Continuous <Continuous>  tacrolimus 0.5 milliGRAM(s) Oral every 12 hours  tiotropium 18 MICROgram(s) Capsule 1 Capsule(s) Inhalation daily  vancomycin  IVPB 750 milliGRAM(s) IV Intermittent every 12 hours    PRN MEDICATIONS  albuterol/ipratropium for Nebulization 3 milliLiter(s) Nebulizer every 4 hours PRN  oxyCODONE    IR 10 milliGRAM(s) Oral every 6 hours PRN    VITALS:   T(F): 98.6  HR: 110  BP: 106/59  RR: 20  SpO2: 100%    LABS:                        14.7   16.30 )-----------( 253      ( 01 Feb 2020 04:46 )             45.1     02-01    146  |  104  |  21<H>  ----------------------------<  54<L>  3.4<L>   |  24  |  1.1    Ca    8.6      01 Feb 2020 04:46  Mg     1.9     02-01    TPro  6.0  /  Alb  3.4<L>  /  TBili  0.4  /  DBili  x   /  AST  46<H>  /  ALT  52<H>  /  AlkPhos  364<H>  02-01    PT/INR - ( 01 Feb 2020 04:46 )   PT: 33.80 sec;   INR: 2.94 ratio         PTT - ( 31 Jan 2020 09:25 )  PTT:36.7 sec    ABG - ( 01 Feb 2020 15:05 )  pH, Arterial: 7.40  pH, Blood: x     /  pCO2: 32    /  pO2: 167   / HCO3: 20    / Base Excess: -3.8  /  SaO2: 99          Sedimentation Rate, Erythrocyte: 55 mm/Hr <H> (02-01-20 @ 04:46)      CARDIAC MARKERS ( 31 Jan 2020 09:25 )  x     / <0.01 ng/mL / x     / x     / x          RADIOLOGY:  < from: Xray Chest 1 View-PORTABLE IMMEDIATE (02.01.20 @ 14:12) >  Impression:    Interval placement of right IJ central venous catheter with tip overlying SVC; no pneumothorax.  Stable bilateral opacities.     < end of copied text >    < from: CT Angio Chest w/ IV Cont (01.31.20 @ 10:59) >  IMPRESSION:     No CT evidence of central pulmonary embolus.    Interval development of multifocal bilateral groundglass opacities as well as new moderate to severe bronchiectasis in the right lung. Findings are concerning for an acute infectious/inflammatory process.     < end of copied text >    PHYSICAL EXAM:  GEN: No acute distress, NC/AT, anicteric, intubated, sedated and paralysed   HEENT: supple  LUNGS: Coarse crackles bilaterally . No wheezing  HEART: S1/S2 present. RRR. No murmurs appreciated  ABD: Soft, non-tender, non-distended. Bowel sounds present. Small hematoma from subq injection  EXT: LE 1+ edema, xerosis with skin lesions, tender to palpation  SKIN: Scattered ecchymoses and purpura on both arms.  Yellow papules on arms and chest.  NEURO: AAOX3, moves all extremities, no focal deficits        Assessment and Plan:   Assessment:  · Assessment		  75 y/o female with pmhx of asthma, HTN,  autoimmune hepatitis, heterozygous prothrombin gene mutation with hx of PE and DVT on coumadin admitted for SOB     IMPRESSION:    Acute hypoxic resp failure - inflammatory(++) vs infectious( less likely)   Autoimmune hepatitis   Recent CMV infection  h/o hypercoagulable state/ vte on coumadin     # Acute hypoxic resp failure - inflammatory(++) vs infectious:  - + flu, with possible superimposed PNA   - intubated , sedated  and paralyzed   - CT scan chest showing interval development of multifocal bilateral ground-glass opacities as well as new moderate to severe bronchiectasis in the right lung. Findings are concerning for an acute infectious/inflammatory process.   - C/w Solu-medrol 60 mg iV q12  - C/w Vanc 750 mg q12h IV and Cefepime 2g q12h IV   - oseltamivir 75 mg BID for 10 days   - C/w Duoneb q6h and q4h PRN  - F/up autoimmune panel ( TEE, RF, GBM, ANCA, IgA endomysial ab, APS panel)  - F/up CRP  - Tylenol for fever, oxycodone for severe pain    # Leukocytosis due to infectious vs inflammatory process:  - WBC 19,000 with a fever, neutrophil predominance  - F/up cultures  - cw methylprednisone  - C/w vancomycin and cefepime  - ID following    #Immunosuppressed: autoimmune hepatitis on prednisone and tacrolimus      # Autoimmune hepatitis:  - On prednisone 60 mg PO qd at home  - c/w methylprednisolone 60 mg iv BID   - C/w tacrolimus 0.5 q12h    # Recent CMV infection in January 2020:  - In January 2017, CMV sent because patient is immunosuppressed treated with supportive therapy  - No need for further workup    # HTN:  -monitor BP  - C/w amlodipine 10 mg qd PO  - Patient on lopressor 50 mg PO qd ( to clarify that), holding now    # heterozygous prothrombin gene mutation with hx of PE and DVT on coumadin:  - holding coumadin for supratherapeutic INR  - Resume coumadin once INR <3    # Hx of asthma:  - C/w montelukast qd    # GI PPx: Protonix 40 mg PO qd  # DVT PPx: sequentials  # Activity: bedrest  # Dispo: ICU for now  # Code Status: FULL

## 2020-02-01 NOTE — CONSULT NOTE ADULT - ASSESSMENT
ASSESSMENT  73y/o F w/ hx of asthma, COPD not on home O2, autoimmune hepatitis on prednisone and tacrolimus, heterozygous prothrombin gene mutation with hx of PE and DVT on coumadin with recent hospitalization in January 2020 with a diagnosis of pneumonia presents for worsening SOB, hemoptysis and cough.    IMPRESSION  #Flu + AH1, ?superimposed atypical PNA. Recent testing 1/20 negative for PCP, Fungal, etc, previous bronch cx with yeast (likely oral contaminant) since GMS neg    CTA chest showing no PE but showing interval development of multifocal bilateral groundglass opacities as well as new moderate to severe bronchiectasis in the right lung,   1/13 CT b/l GGOs, compatible with intersitial edema      During last hospitalization: bronch cx bacterial NG, +yeast, pending ID, neg legionella, + MRSA    12/7 WCX MSSA, Kleb, bacteroides  #Severe sepsis on admission P>90, WBC 19, RR>20, lactic acidosis Lactate, Blood: 2.9 mmol/L (01-31-20 @ 09:25)    afebrile   #Elevated Alk ph, transaminitis  #Immunosuppressed: autoimmune hepatitis on prednisone and tacrolimus    RECOMMENDATIONS  This is an incomplete pended note, all final recommendations to follow.   - oseltamivir 75 milliGRAM(s) Oral two times a day would treat 7-10 days given immunocompromised   - send serum fungitell, cryptococcal serum Ag, CMV serum PCR (+IgM and IgG last admission)  - repeat bronch: send cx, fungal, PCP stain, Toxo PCR  - check procalcitonin  - d/c vanc doubt MRSA PNA  - Imaging not really consistent with bacterial PNA, would change cefepime to levaquin 750mg q24h IV for atypical coverage    Spectra [id number] ASSESSMENT  75y/o F w/ hx of asthma, COPD not on home O2, autoimmune hepatitis on prednisone and tacrolimus, heterozygous prothrombin gene mutation with hx of PE and DVT on coumadin with recent hospitalization in January 2020 with a diagnosis of pneumonia presents for worsening SOB, hemoptysis and cough.    IMPRESSION  #Flu + AH1, ?superimposed atypical PNA. Recent bronch 1/20 negative for PCP, Fungal, etc, previous bronch cx with yeast (likely oral contaminant) since GMS neg    CTA chest showing no PE but showing interval development of multifocal bilateral groundglass opacities as well as new moderate to severe bronchiectasis in the right lung,   1/13 CT b/l GGOs, compatible with intersitial edema      Serum Pro-Brain Natriuretic Peptide: 722 pg/mL (01.31.20 @ 09:25)<-- Serum Pro-Brain Natriuretic Peptide: 345 pg/mL (01.13.20 @ 12:10)    During last hospitalization: bronch cx bacterial NG, +yeast, pending ID, neg legionella, + MRSA  #Severe sepsis on admission P>90, WBC 19, RR>20, lactic acidosis Lactate, Blood: 2.9 mmol/L (01-31-20 @ 09:25)    afebrile   #Elevated Alk ph, transaminitis  #LLE wound, not infection     12/7 WCX MSSA, Kleb, bacteroides    8/2019 MRSA in a wound  #Immunosuppressed: autoimmune hepatitis on prednisone and tacrolimus    RECOMMENDATIONS  - oseltamivir 75 milliGRAM(s) Oral two times a day would treat 7-10 days given immunocompromised   - send serum fungitell, cryptococcal serum Ag, CMV serum PCR (+IgM and IgG last admission)  - Consider repeat bronch: send cx, fungal, PCP stain, viral cx, Toxo PCR  - check procalcitonin  - d/c vanc doubt MRSA PNA  - Imaging not really consistent with bacterial PNA, would change cefepime to levaquin 750mg q24h IV for atypical coverage  - HIV screen    Spectra [phone]

## 2020-02-02 NOTE — DIETITIAN INITIAL EVALUATION ADULT. - OTHER INFO
Pt was admitted for SOB, acute hypoxic resp failure - flu with possible superimposed PNA as per MD. In CCU, intubated and sedated, on 1 pressor. Leukocytosis due to infectious vs inflammatory process - ID following. Autoimmune hepatitis.   Nephrology following - REJI likely ATN 2/2 hypoperfusion, pt is non-oliguric.

## 2020-02-02 NOTE — DIETITIAN INITIAL EVALUATION ADULT. - PHYSICAL APPEARANCE
intubated, sedated, OGT in place. BMI - 28.5 ( 162.6c,m/75.4kg), IBW- 54.5kg, SKin-  lt knee abrasion. GI - 1 BM yesterday (on lactulose).

## 2020-02-02 NOTE — PROGRESS NOTE ADULT - ASSESSMENT
ASSESSMENT  73y/o F w/ hx of asthma, COPD not on home O2, autoimmune hepatitis on prednisone and tacrolimus, heterozygous prothrombin gene mutation with hx of PE and DVT on coumadin with recent hospitalization in January 2020 with a diagnosis of pneumonia presents for worsening SOB, hemoptysis and cough.    IMPRESSION  #Flu + AH1, ?superimposed atypical PNA. Recent bronch 1/20 negative for PCP, Fungal, etc, previous bronch cx with yeast (likely oral contaminant) since GMS neg    Procalcitonin, Serum: 1.86: >/= 0.5 but < 2.0: Systemic infection (sepsis) is possible, but otherconditions are known to elevate PCT as well. (02.01.20 @ 20:30)    CTA chest showing no PE but showing interval development of multifocal bilateral groundglass opacities as well as new moderate to severe bronchiectasis in the right lung,   1/13 CT b/l GGOs, compatible with intersitial edema      Serum Pro-Brain Natriuretic Peptide: 722 pg/mL (01.31.20 @ 09:25)<-- Serum Pro-Brain Natriuretic Peptide: 345 pg/mL (01.13.20 @ 12:10)    During last hospitalization: bronch cx bacterial NG, +yeast, pending ID, neg legionella, + MRSA PCR    MRSA PCR Result.: Positive (02-01-20 @ 20:40)  #Severe sepsis on admission P>90, WBC 19, RR>20, lactic acidosis Lactate, Blood: 2.9 mmol/L (01-31-20 @ 09:25)    afebrile   #Elevated Alk ph, transaminitis  #LLE wound, not infection     12/7 WCX MSSA, Kleb, bacteroides    8/2019 MRSA in a wound  #Immunosuppressed: autoimmune hepatitis on prednisone and tacrolimus    RECOMMENDATIONS  - oseltamivir 75 milliGRAM(s) Oral two times a day would treat 7-10 days given immunocompromised   - send serum fungitell, cryptococcal serum Ag, CMV serum PCR (+IgM and IgG last admission)  - Recommend bronch: send cx, fungal, AFB, PCP stain, viral cx, Toxo PCR  - doubt MRSA PNA, Imaging not really consistent with bacterial PNA,   - levaquin 750mg q24h IV for atypical coverage  - cefepime   IVPB 2000 milliGRAM(s) IV Intermittent every 12 hours  - LDH     Spectra [phone]

## 2020-02-02 NOTE — CONSULT NOTE ADULT - SUBJECTIVE AND OBJECTIVE BOX
NEPHROLOGY CONSULTATION NOTE    75y/o F w/ hx of asthma, COPD not on home O2, autoimmune hepatitis on prednisone and tacrolimus, heterozygous prothrombin gene mutation with hx of PE and DVT on coumadin with recent hospitalization in January 2020 with a diagnosis of pneumonia presents for worsening SOB, hemoptysis and cough. Everything started yesterday night when patient was in bed, started to feel shortness of breath and had many episodes of hemoptysis with clots, she also had substernal chest pain non radiating, moderate in intensity that worsens on coughing. She denies fever but endorses chills. She also admits for lightheadedness that occurred yesterday, no HA, abd pain, dysuria or paresthesias. She had 2 loose bowel movements since yesterday with some blood in the stools that she attributes to her hemorrhoids. She stopped Coumadin couple of days ago since her INR was supratherapeutic. She is from [facility] short term and also mentions that her  and another family member have the flu and she was exposed to them. She did not have the flu shot this season.  In ED, temp was 100.1 rectally, tachycardic ( sinus) , she was saturating to 70s on non rebreather and her SaO2 went up to 95%. ABG showing respiratory alkalosis initially and then corrected after BIPAP placement and correction of RR.  CTA chest showing no PE but showing interval development of multifocal bilateral groundglass opacities as well as new moderate to severe bronchiectasis in the right lung. Findings are concerning for an acute infectious/inflammatory process. (1/31/2020)      PAST MEDICAL & SURGICAL HISTORY:  Prothrombin gene mutation  Autoimmune hepatitis  Other chronic pulmonary embolism without acute cor pulmonale  Essential hypertension  Autoimmune hepatitis treated with steroids  Asthma  DVT (deep venous thrombosis)  S/P debridement  History of back surgery    Allergies:  No Known Allergies    Home Medications:  acetaminophen 500 mg oral tablet: 2 tab(s) orally every 8 hours (20 Jan 2020 13:38)  amLODIPine 10 mg oral tablet: 1 tab(s) orally once a day (at bedtime) (20 Jan 2020 13:38)  budesonide 3 mg oral capsule, extended release: 1 cap(s) orally 2 times a day (05 Dec 2019 17:19)  ferrous sulfate 325 mg (65 mg elemental iron) oral delayed release tablet: 1 tab(s) orally once a day (05 Dec 2019 17:19)  furosemide 20 mg oral tablet: 1 tab(s) orally once a day (05 Dec 2019 17:19)  gabapentin 300 mg oral capsule: 1 cap(s) orally once a day (at bedtime) (20 Jan 2020 13:38)  ibuprofen 400 mg oral tablet: 1 tab(s) orally every 6 hours, As needed, Moderate Pain (4 - 6) (20 Jan 2020 13:38)  ipratropium-albuterol 0.5 mg-2.5 mg/3 mLinhalation solution: 3 milliliter(s) inhaled every 6 hours, As Needed (20 Jan 2020 13:40)  lidocaine 5% topical film: Apply topically to affected area once a day (20 Jan 2020 13:38)  Metoprolol Tartrate 50 mg oral tablet: 1 tab(s) orally once a day (05 Dec 2019 17:19)  montelukast 10 mg oral tablet: 1 tab(s) orally once a day (at bedtime) (05 Dec 2019 17:19)  Multiple Vitamins with Minerals oral tablet: 1 tab(s) orally once a day (20 Jan 2020 13:38)  oxyCODONE 10 mg oral tablet: 1 tab(s) orally every 6 hours, As needed, Severe Pain (7 - 10) (20 Jan 2020 13:38)  predniSONE 20 mg oral tablet: 3 tab(s) orally once a day (20 Jan 2020 13:38)  ProAir HFA 90 mcg/inh inhalation aerosol: 1 puff(s) inhaled every 4 hours, As Needed (20 Jan 2020 13:40)  risedronate 150 mg oral tablet: 1 tab(s) orally once a month (05 Dec 2019 17:19)  tacrolimus 0.5 mg oral capsule: 1 cap(s) orally every 12 hours (05 Dec 2019 17:19)  tiotropium 18 mcg inhalation capsule: 1 cap(s) inhaled once a day, As Needed (20 Jan 2020 13:40)  Vitamin B12 1000 mcg oral tablet: 1 tab(s) orally once a day (05 Dec 2019 17:19)  Vitamin C 1000 mg oral tablet: 1 tab(s) orally once a day (05 Dec 2019 17:19)  warfarin 4 mg oral tablet: 1 tab(s) orally once a day (at bedtime) (20 Jan 2020 13:38)    Hospital Medications:   MEDICATIONS  (STANDING):  ALBUTerol    90 MICROgram(s) HFA Inhaler 1 Puff(s) Inhalation every 6 hours  cefepime   IVPB 2000 milliGRAM(s) IV Intermittent every 12 hours  chlorhexidine 0.12% Liquid 15 milliLiter(s) Oral Mucosa every 12 hours  chlorhexidine 4% Liquid 1 Application(s) Topical <User Schedule>  cisatracurium Infusion 3 MICROgram(s)/kG/Min (13.572 mL/Hr) IV Continuous <Continuous>  cyanocobalamin 1000 MICROGram(s) Oral daily  dexMEDEtomidine Infusion 0.2 MICROgram(s)/kG/Hr (3.77 mL/Hr) IV Continuous <Continuous>  fentaNYL   Infusion 0.5 MICROgram(s)/kG/Hr (3.77 mL/Hr) IV Continuous <Continuous>  fentaNYL   Infusion. 0.5 MICROgram(s)/kG/Hr (3.77 mL/Hr) IV Continuous <Continuous>  furosemide   Injectable 20 milliGRAM(s) IV Push daily  gabapentin 300 milliGRAM(s) Oral at bedtime  influenza   Vaccine 0.5 milliLiter(s) IntraMuscular once  insulin regular Infusion 4 Unit(s)/Hr (4 mL/Hr) IV Continuous <Continuous>  ipratropium 17 MICROgram(s) HFA Inhaler 1 Puff(s) Inhalation every 6 hours  lactulose Syrup 10 Gram(s) Oral two times a day  levoFLOXacin IVPB      methylPREDNISolone sodium succinate Injectable 60 milliGRAM(s) IV Push two times a day  midazolam Infusion 0.02 mG/kG/Hr (1.508 mL/Hr) IV Continuous <Continuous>  montelukast 10 milliGRAM(s) Oral at bedtime  multivitamin/minerals 1 Tablet(s) Oral daily  norepinephrine Infusion 0.05 MICROgram(s)/kG/Min (3.534 mL/Hr) IV Continuous <Continuous>  oseltamivir 75 milliGRAM(s) Oral two times a day  pantoprazole    Tablet 40 milliGRAM(s) Oral before breakfast  potassium chloride  20 mEq/100 mL IVPB 20 milliEquivalent(s) IV Intermittent every 2 hours  propofol Infusion 10 MICROgram(s)/kG/Min (4.524 mL/Hr) IV Continuous <Continuous>  senna 2 Tablet(s) Oral at bedtime  tacrolimus 0.5 milliGRAM(s) Oral every 12 hours  tiotropium 18 MICROgram(s) Capsule 1 Capsule(s) Inhalation daily  vancomycin  IVPB 750 milliGRAM(s) IV Intermittent every 12 hours      SOCIAL HISTORY:  Denies ETOH,Smoking,   FAMILY HISTORY:  No pertinent family history in first degree relatives        REVIEW OF SYSTEMS:    All other review of systems is negative unless indicated above.    VITALS:  T(F): 96.5 (02-02-20 @ 08:00), Max: 98.5 (02-01-20 @ 12:00)  HR: 80 (02-02-20 @ 10:00)  BP: 92/64 (02-02-20 @ 10:00)  RR: 30 (02-02-20 @ 10:00)  SpO2: 99% (02-02-20 @ 10:20)    02-01 @ 07:01  -  02-02 @ 07:00  --------------------------------------------------------  IN: 1768.2 mL / OUT: 1050 mL / NET: 718.2 mL    02-02 @ 07:01  -  02-02 @ 11:25  --------------------------------------------------------  IN: 465 mL / OUT: 160 mL / NET: 305 mL          02-02-20 @ 07:01  -  02-02-20 @ 11:25  --------------------------------------------------------  IN: 0 mL / OUT: 160 mL / NET: -160 mL      I&O's Detail    01 Feb 2020 07:01  -  02 Feb 2020 07:00  --------------------------------------------------------  IN:    cisatracurium Infusion: 92.2 mL    dexmedetomidine Infusion: 50 mL    dexmedetomidine Infusion: 298.3 mL    fentaNYL  Infusion: 505.1 mL    IV PiggyBack: 600 mL    norepinephrine Infusion: 200 mL    propofol Infusion: 22.6 mL  Total IN: 1768.2 mL    OUT:    Voided: 1050 mL  Total OUT: 1050 mL    Total NET: 718.2 mL      02 Feb 2020 07:01  -  02 Feb 2020 11:25  --------------------------------------------------------  IN:    cisatracurium Infusion: 10 mL    dexmedetomidine Infusion: 40 mL    fentaNYL  Infusion: 75 mL    IV PiggyBack: 300 mL    norepinephrine Infusion: 40 mL  Total IN: 465 mL    OUT:    Ureteral Catheter: 160 mL  Total OUT: 160 mL    Total NET: 305 mL            PHYSICAL EXAM:  Constitutional: intubated on vent  Respiratory: on MV, b/l rhonchi  Cardiovascular: S1, S2, RRR  Gastrointestinal: distension +  Extremities: No peripheral edema  : + henley.     Vascular Access:    LABS:  02-02    140  |  104  |  38<H>  ----------------------------<  338<H>  4.1   |  22  |  2.0<H>    Ca    7.1<L>      02 Feb 2020 07:50  Mg     2.7     02-02    TPro  5.6<L>  /  Alb  2.9<L>  /  TBili  0.3  /  DBili      /  AST  27  /  ALT  46<H>  /  AlkPhos  328<H>  02-02    Creatinine Trend: 2.0 <--, 2.0 <--, 1.9 <--, 1.1 <--, 1.1 <--, 1.0 <--, 0.9 <--, 1.0 <--, 1.0 <--, 0.8 <--, 1.0 <--                        12.5   21.76 )-----------( 183      ( 02 Feb 2020 07:50 )             39.6     Blood Gas Profile - Arterial (02.02.20 @ 05:31)    pH, Arterial: 7.28    pCO2, Arterial: 50 mmHg    pO2, Arterial: 82 mmHg    HCO3, Arterial: 24 mmoL/L    Base Excess, Arterial: -3.6 mmoL/L    Oxygen Saturation, Arterial: 96 %    Troponin T, Serum: <0.01 ng/mL (01.31.20 @ 09:25)    Urine Studies:      RADIOLOGY & ADDITIONAL STUDIES:  < from: Xray Chest 1 View-PORTABLE IMMEDIATE (02.01.20 @ 17:37) >  Impression:      Stable bilateral parenchymal opacities..  Enteric tube extends below diaphragm.  Stable additional support devices.    < end of copied text >    < from: CT Angio Chest w/ IV Cont (01.31.20 @ 10:59) >  IMPRESSION:     No CT evidence of central pulmonary embolus.    Interval development of multifocal bilateral groundglass opacities as well as new moderate to severe bronchiectasis in the right lung. Findings are concerning for an acute infectious/inflammatory process.     < end of copied text >

## 2020-02-02 NOTE — PROGRESS NOTE ADULT - SUBJECTIVE AND OBJECTIVE BOX
Over Night Events: On levophed 0.1 , fentanyl 3.3 , precedex, Nimbex ,     ROS:  See HPI    PHYSICAL EXAM    ICU Vital Signs Last 24 Hrs  T(C): 35.9 (02 Feb 2020 04:00), Max: 36.9 (01 Feb 2020 12:00)  T(F): 96.6 (02 Feb 2020 04:00), Max: 98.5 (01 Feb 2020 12:00)  HR: 84 (02 Feb 2020 06:00) (84 - 110)  BP: 100/66 (02 Feb 2020 06:00) (54/30 - 188/69)  BP(mean): 76 (02 Feb 2020 06:00) (35 - 122)  ABP: --  ABP(mean): --  RR: 30 (02 Feb 2020 06:00) (20 - 40)  SpO2: 100% (02 Feb 2020 06:00) (89% - 100%)      General: Intubated and sedated  HEENT: LUIS A  . ET tube+           Lungs: dec BS b/l   Cardiovascular: Regular   Abdomen: Soft, Positive BS  Extremities: No clubbing   Skin: Warm  Neurological: Non focal       02-01-20 @ 07:01  -  02-02-20 @ 07:00  --------------------------------------------------------  IN:    cisatracurium Infusion: 92.2 mL    dexmedetomidine Infusion: 50 mL    dexmedetomidine Infusion: 298.3 mL    fentaNYL  Infusion: 505.1 mL    IV PiggyBack: 600 mL    norepinephrine Infusion: 200 mL    propofol Infusion: 22.6 mL  Total IN: 1768.2 mL    OUT:    Voided: 1050 mL  Total OUT: 1050 mL    Total NET: 718.2 mL          LABS:                          13.0   24.61 )-----------( 204      ( 02 Feb 2020 04:30 )             41.3                                               02-02    140  |  99  |  37<H>  ----------------------------<  338<H>  2.7<LL>   |  22  |  2.0<H>    Ca    7.3<L>      02 Feb 2020 04:30  Mg     2.0     02-02    TPro  5.6<L>  /  Alb  2.9<L>  /  TBili  0.3  /  DBili  x   /  AST  27  /  ALT  46<H>  /  AlkPhos  328<H>  02-02      PT/INR - ( 02 Feb 2020 04:30 )   PT: >40.00 sec;   INR: 3.66 ratio         PTT - ( 02 Feb 2020 04:30 )  PTT:29.3 sec                                           CARDIAC MARKERS ( 31 Jan 2020 09:25 )  x     / <0.01 ng/mL / x     / x     / x                                                LIVER FUNCTIONS - ( 02 Feb 2020 04:30 )  Alb: 2.9 g/dL / Pro: 5.6 g/dL / ALK PHOS: 328 U/L / ALT: 46 U/L / AST: 27 U/L / GGT: x                                                  Culture - Blood (collected 31 Jan 2020 09:50)  Source: .Blood Blood  Preliminary Report (01 Feb 2020 23:02):    No growth to date.    Culture - Blood (collected 31 Jan 2020 09:50)  Source: .Blood Blood  Preliminary Report (01 Feb 2020 23:02):    No growth to date.                                                   Mode: AC/ CMV (Assist Control/ Continuous Mandatory Ventilation)  RR (machine): 30  TV (machine): 400  FiO2: 100  PEEP: 10  ITime: 1  MAP: 17  PIP: 31                                      ABG - ( 02 Feb 2020 05:31 )  pH, Arterial: 7.28  pH, Blood: x     /  pCO2: 50    /  pO2: 82    / HCO3: 24    / Base Excess: -3.6  /  SaO2: 96                  MEDICATIONS  (STANDING):  ALBUTerol    90 MICROgram(s) HFA Inhaler 1 Puff(s) Inhalation every 6 hours  cefepime   IVPB 2000 milliGRAM(s) IV Intermittent every 12 hours  chlorhexidine 0.12% Liquid 15 milliLiter(s) Oral Mucosa every 12 hours  chlorhexidine 4% Liquid 1 Application(s) Topical <User Schedule>  cisatracurium Infusion 3 MICROgram(s)/kG/Min (13.572 mL/Hr) IV Continuous <Continuous>  cyanocobalamin 1000 MICROGram(s) Oral daily  dexMEDEtomidine Infusion 0.2 MICROgram(s)/kG/Hr (3.77 mL/Hr) IV Continuous <Continuous>  fentaNYL   Infusion 0.5 MICROgram(s)/kG/Hr (3.77 mL/Hr) IV Continuous <Continuous>  fentaNYL   Infusion. 0.5 MICROgram(s)/kG/Hr (3.77 mL/Hr) IV Continuous <Continuous>  furosemide   Injectable 20 milliGRAM(s) IV Push daily  gabapentin 300 milliGRAM(s) Oral at bedtime  influenza   Vaccine 0.5 milliLiter(s) IntraMuscular once  ipratropium 17 MICROgram(s) HFA Inhaler 1 Puff(s) Inhalation every 6 hours  methylPREDNISolone sodium succinate Injectable 60 milliGRAM(s) IV Push two times a day  montelukast 10 milliGRAM(s) Oral at bedtime  multivitamin/minerals 1 Tablet(s) Oral daily  norepinephrine Infusion 0.05 MICROgram(s)/kG/Min (3.534 mL/Hr) IV Continuous <Continuous>  oseltamivir 75 milliGRAM(s) Oral two times a day  pantoprazole    Tablet 40 milliGRAM(s) Oral before breakfast  propofol Infusion 10 MICROgram(s)/kG/Min (4.524 mL/Hr) IV Continuous <Continuous>  tacrolimus 0.5 milliGRAM(s) Oral every 12 hours  tiotropium 18 MICROgram(s) Capsule 1 Capsule(s) Inhalation daily  vancomycin  IVPB 750 milliGRAM(s) IV Intermittent every 12 hours    MEDICATIONS  (PRN):  oxyCODONE    IR 10 milliGRAM(s) Oral every 6 hours PRN Severe Pain (7 - 10)      Xrays:   CXR reviewed                                                                                     ECHO < from: Transthoracic Echocardiogram (01.15.20 @ 10:35) >  1. Mildly increased LV wall thickness.   2. Spectral Doppler shows impaired relaxation pattern of left ventricular myocardial filling (Grade I diastolic dysfunction).   3. Mild mitral valve regurgitation.   4. Sclerotic aortic valve with normal opening.    < end of copied text > Over Night Events: s/p intubation for worsening SOB, now On levophed 0.1 , fentanyl 3.3 , precedex, Nimbex , no T    ROS:  See HPI    PHYSICAL EXAM    ICU Vital Signs Last 24 Hrs  T(C): 35.9 (02 Feb 2020 04:00), Max: 36.9 (01 Feb 2020 12:00)  T(F): 96.6 (02 Feb 2020 04:00), Max: 98.5 (01 Feb 2020 12:00)  HR: 84 (02 Feb 2020 06:00) (84 - 110)  BP: 100/66 (02 Feb 2020 06:00) (54/30 - 188/69)  BP(mean): 76 (02 Feb 2020 06:00) (35 - 122)  RR: 30 (02 Feb 2020 06:00) (20 - 40)  SpO2: 100% (02 Feb 2020 06:00) (89% - 100%)      General: Intubated and sedated, paralyzed  HEENT: LUIS A  . ET tube+           Lungs: dec BS b/l , crackles  Cardiovascular: Regular , CHRISTELLE 3/6  Abdomen: Soft, Positive BS  Extremities: No clubbing   Skin: Warm  Neurological: sedated      02-01-20 @ 07:01  -  02-02-20 @ 07:00  --------------------------------------------------------  IN:    cisatracurium Infusion: 92.2 mL    dexmedetomidine Infusion: 50 mL    dexmedetomidine Infusion: 298.3 mL    fentaNYL  Infusion: 505.1 mL    IV PiggyBack: 600 mL    norepinephrine Infusion: 200 mL    propofol Infusion: 22.6 mL  Total IN: 1768.2 mL    OUT:    Voided: 1050 mL  Total OUT: 1050 mL    Total NET: 718.2 mL          LABS:                          13.0   24.61 )-----------( 204      ( 02 Feb 2020 04:30 )             41.3                                               02-02    140  |  99  |  37<H>  ----------------------------<  338<H>  2.7<LL>   |  22  |  2.0<H>    Ca    7.3<L>      02 Feb 2020 04:30  Mg     2.0     02-02    TPro  5.6<L>  /  Alb  2.9<L>  /  TBili  0.3  /  DBili  x   /  AST  27  /  ALT  46<H>  /  AlkPhos  328<H>  02-02      PT/INR - ( 02 Feb 2020 04:30 )   PT: >40.00 sec;   INR: 3.66 ratio         PTT - ( 02 Feb 2020 04:30 )  PTT:29.3 sec                                           CARDIAC MARKERS ( 31 Jan 2020 09:25 )  x     / <0.01 ng/mL / x     / x     / x                                                LIVER FUNCTIONS - ( 02 Feb 2020 04:30 )  Alb: 2.9 g/dL / Pro: 5.6 g/dL / ALK PHOS: 328 U/L / ALT: 46 U/L / AST: 27 U/L / GGT: x                                                  Culture - Blood (collected 31 Jan 2020 09:50)  Source: .Blood Blood  Preliminary Report (01 Feb 2020 23:02):    No growth to date.    Culture - Blood (collected 31 Jan 2020 09:50)  Source: .Blood Blood  Preliminary Report (01 Feb 2020 23:02):    No growth to date.                                                   Mode: AC/ CMV (Assist Control/ Continuous Mandatory Ventilation)  RR (machine): 30  TV (machine): 400  FiO2: 100  PEEP: 10  ITime: 1  MAP: 17  PIP: 31                                      ABG - ( 02 Feb 2020 05:31 )  pH, Arterial: 7.28  pH, Blood: x     /  pCO2: 50    /  pO2: 82    / HCO3: 24    / Base Excess: -3.6  /  SaO2: 96                  MEDICATIONS  (STANDING):  ALBUTerol    90 MICROgram(s) HFA Inhaler 1 Puff(s) Inhalation every 6 hours  cefepime   IVPB 2000 milliGRAM(s) IV Intermittent every 12 hours  chlorhexidine 0.12% Liquid 15 milliLiter(s) Oral Mucosa every 12 hours  chlorhexidine 4% Liquid 1 Application(s) Topical <User Schedule>  cisatracurium Infusion 3 MICROgram(s)/kG/Min (13.572 mL/Hr) IV Continuous <Continuous>  cyanocobalamin 1000 MICROGram(s) Oral daily  dexMEDEtomidine Infusion 0.2 MICROgram(s)/kG/Hr (3.77 mL/Hr) IV Continuous <Continuous>  fentaNYL   Infusion 0.5 MICROgram(s)/kG/Hr (3.77 mL/Hr) IV Continuous <Continuous>  fentaNYL   Infusion. 0.5 MICROgram(s)/kG/Hr (3.77 mL/Hr) IV Continuous <Continuous>  furosemide   Injectable 20 milliGRAM(s) IV Push daily  gabapentin 300 milliGRAM(s) Oral at bedtime  influenza   Vaccine 0.5 milliLiter(s) IntraMuscular once  ipratropium 17 MICROgram(s) HFA Inhaler 1 Puff(s) Inhalation every 6 hours  methylPREDNISolone sodium succinate Injectable 60 milliGRAM(s) IV Push two times a day  montelukast 10 milliGRAM(s) Oral at bedtime  multivitamin/minerals 1 Tablet(s) Oral daily  norepinephrine Infusion 0.05 MICROgram(s)/kG/Min (3.534 mL/Hr) IV Continuous <Continuous>  oseltamivir 75 milliGRAM(s) Oral two times a day  pantoprazole    Tablet 40 milliGRAM(s) Oral before breakfast  propofol Infusion 10 MICROgram(s)/kG/Min (4.524 mL/Hr) IV Continuous <Continuous>  tacrolimus 0.5 milliGRAM(s) Oral every 12 hours  tiotropium 18 MICROgram(s) Capsule 1 Capsule(s) Inhalation daily  vancomycin  IVPB 750 milliGRAM(s) IV Intermittent every 12 hours    MEDICATIONS  (PRN):  oxyCODONE    IR 10 milliGRAM(s) Oral every 6 hours PRN Severe Pain (7 - 10)      Xrays:   CXR reviewed                                                                                     ECHO < from: Transthoracic Echocardiogram (01.15.20 @ 10:35) >  1. Mildly increased LV wall thickness.   2. Spectral Doppler shows impaired relaxation pattern of left ventricular myocardial filling (Grade I diastolic dysfunction).   3. Mild mitral valve regurgitation.   4. Sclerotic aortic valve with normal opening.    < end of copied text >

## 2020-02-02 NOTE — PROGRESS NOTE ADULT - SUBJECTIVE AND OBJECTIVE BOX
DARNELL, DONI  74y, Female  Allergy: No Known Allergies      CHIEF COMPLAINT: SOB and desaturation (02 Feb 2020 11:24)      INTERVAL EVENTS/HPI  - intubated on levo   - T(F): , Max: 98 (02-01-20 @ 16:00)  - WBC Count: 21.76 (02-02-20 @ 07:50)  WBC Count: 24.61 (02-02-20 @ 04:30)  - Creatinine, Serum: 2.0 (02-02-20 @ 11:00)  Creatinine, Serum: 2.0 (02-02-20 @ 07:50)       ROS  unable to obtain history secondary to patient's mental status and/or sedation     VITALS:  T(F): 96, Max: 98 (02-01-20 @ 16:00)  HR: 80  BP: 80/57  RR: 30Vital Signs Last 24 Hrs  T(C): 35.6 (02 Feb 2020 12:00), Max: 36.7 (01 Feb 2020 16:00)  T(F): 96 (02 Feb 2020 12:00), Max: 98 (01 Feb 2020 16:00)  HR: 80 (02 Feb 2020 13:00) (80 - 110)  BP: 80/57 (02 Feb 2020 13:00) (80/50 - 111/67)  BP(mean): 64 (02 Feb 2020 13:00) (59 - 89)  RR: 30 (02 Feb 2020 13:00) (20 - 40)  SpO2: 99% (02 Feb 2020 13:00) (90% - 100%)    PHYSICAL EXAM:  Gen: intubated  HEENT: Normocephalic, atraumatic  Neck: supple, no lymphadenopathy  CV: Regular rate & regular rhythm  Lungs: decreased BS at bases, no fremitus  Abdomen: Soft, BS present  Ext: Warm, well perfused trace LE edema  Neuro: sedated  Skin: no rash, no erythema  Lines: no phlebitis    FH: Non-contributory  Social Hx: Non-contributory    TESTS & MEASUREMENTS:                        12.5   21.76 )-----------( 183      ( 02 Feb 2020 07:50 )             39.6     02-02    139  |  104  |  39<H>  ----------------------------<  351<H>  5.0   |  20  |  2.0<H>    Ca    6.9<L>      02 Feb 2020 11:00  Mg     2.7     02-02    TPro  5.6<L>  /  Alb  2.9<L>  /  TBili  0.3  /  DBili  x   /  AST  27  /  ALT  46<H>  /  AlkPhos  328<H>  02-02    eGFR if Non African American: 24 mL/min/1.73M2 (02-02-20 @ 11:00)  eGFR if African American: 28 mL/min/1.73M2 (02-02-20 @ 11:00)  eGFR if Non African American: 24 mL/min/1.73M2 (02-02-20 @ 07:50)  eGFR if African American: 28 mL/min/1.73M2 (02-02-20 @ 07:50)  eGFR if Non African American: 24 mL/min/1.73M2 (02-02-20 @ 04:30)  eGFR if African American: 28 mL/min/1.73M2 (02-02-20 @ 04:30)  eGFR if Non African American: 26 mL/min/1.73M2 (02-02-20 @ 02:25)  eGFR if African American: 30 mL/min/1.73M2 (02-02-20 @ 02:25)    LIVER FUNCTIONS - ( 02 Feb 2020 04:30 )  Alb: 2.9 g/dL / Pro: 5.6 g/dL / ALK PHOS: 328 U/L / ALT: 46 U/L / AST: 27 U/L / GGT: x               Culture - Blood (collected 01-31-20 @ 09:50)  Source: .Blood Blood  Preliminary Report (02-01-20 @ 23:02):    No growth to date.    Culture - Blood (collected 01-31-20 @ 09:50)  Source: .Blood Blood  Preliminary Report (02-01-20 @ 23:02):    No growth to date.        Blood Gas Venous - Lactate: 1.8 mmoL/L (01-31-20 @ 12:52)  Lactate, Blood: 2.9 mmol/L (01-31-20 @ 09:25)      INFECTIOUS DISEASES TESTING  MRSA PCR Result.: Positive (02-01-20 @ 20:40)  Rapid RVP Result: Detected (01-31-20 @ 16:24)  Legionella Antigen, Urine: Negative (01-20-20 @ 02:50)  MRSA PCR Result.: Positive (01-14-20 @ 13:59)  MRSA PCR Result.: Negative (08-13-19 @ 08:46)      RADIOLOGY & ADDITIONAL TESTS:  I have personally reviewed the last Chest xray  CXR      CT  CT Angio Chest w/ IV Cont:   EXAM:  CT ANGIO CHEST (W)AW IC            PROCEDURE DATE:  01/31/2020            INTERPRETATION:  CLINICAL STATEMENT: Evaluation for pulmonary embolism. Chest pain.    TECHNIQUE: Multislice helical sections were obtained from the thoracic inlet to the lung bases during rapid administration of 65 mL Optiray 320 intravenous contrast using a CTA protocol. Thin sections were reconstructed through the pulmonary vasculature. Coronal, sagittal and MIP reformatted images are also submitted.    COMPARISON CT: CTA chest 1/13/2020.    OTHER STUDIES USED FOR CORRELATION: None.    Examination limited secondary to contrast being delayed during image capture.      FINDINGS:    PULMONARY EMBOLUS: Evaluation is limited due to the phase of contrast. No evidence of a central pulmonary embolism.    LUNGS, PLEURA, AIRWAYS: Patent proximal tracheobronchial tree. Interval development of multifocal bilateral groundglass opacities right greater than left, as well as new moderate to severe bronchiectasis in the right lung. Bilateral posterior pleural calcifications are noted, unchanged. No pneumothorax.    THORACIC NODES: No mediastinal, hilar, supraclavicular or axillary lymphadenopathy.    MEDIASTINUM/GREAT VESSELS: The heart size. No pericardial effusion.Coronary artery and thoracic aortic calcification. Great vessels are normal caliber. Prominent pericardial fat.    VISUALIZED UPPER ABDOMEN: Small hiatal hernia.    BONES/SOFT TISSUES: Worsening compression fracture of T4. Chronic compression fracture of T10. Multilevel degenerative changes of the spine. Partially visualized spinal hardware in the lower thoracolumbar vertebra.      IMPRESSION:     No CT evidence of central pulmonary embolus.    Interval development of multifocal bilateral groundglass opacities as well as new moderate to severe bronchiectasis in the right lung. Findings are concerning for an acute infectious/inflammatory process.                   PARISH HANSEN M.D., RESIDENT RADIOLOGIST  This document has been electronically signed.  MAY HEARD M.D., ATTENDING RADIOLOGIST  This document has been electronically signed. Jan 31 2020 11:53AM             (01-31-20 @ 10:59)      CARDIOLOGY TESTING  12 Lead ECG:   Ventricular Rate 118 BPM    Atrial Rate 118 BPM    P-R Interval 150 ms    QRS Duration 80 ms    Q-T Interval 326 ms    QTC Calculation(Bezet) 456 ms    P Axis 41 degrees    R Axis -10 degrees    T Axis 11 degrees    Diagnosis Line Sinus tachycardia  Possible Left atrial enlargement  Inferior infarct , age undetermined  Abnormal ECG    Confirmed by MOHSEN SONG MD ([id number]) on 1/31/2020 12:38:55 PM (01-31-20 @ 11:06)      MEDICATIONS  ALBUTerol    90 MICROgram(s) HFA Inhaler 1  cefepime   IVPB 2000  chlorhexidine 0.12% Liquid 15  chlorhexidine 4% Liquid 1  cisatracurium Infusion 3  cyanocobalamin 1000  dexMEDEtomidine Infusion 0.2  fentaNYL   Infusion 0.5  fentaNYL   Infusion. 0.5  furosemide   Injectable 20  gabapentin 300  influenza   Vaccine 0.5  insulin regular Infusion 4  ipratropium 17 MICROgram(s) HFA Inhaler 1  lactulose Syrup 10  levoFLOXacin IVPB   methylPREDNISolone sodium succinate Injectable 60  midazolam Infusion 0.02  montelukast 10  multivitamin/minerals 1  norepinephrine Infusion 0.05  oseltamivir 75  pantoprazole    Tablet 40  propofol Infusion 10  senna 2  tacrolimus 0.5  tiotropium 18 MICROgram(s) Capsule 1  vancomycin  IVPB 750      ANTIBIOTICS:  cefepime   IVPB 2000 milliGRAM(s) IV Intermittent every 12 hours  levoFLOXacin IVPB      oseltamivir 75 milliGRAM(s) Oral two times a day  vancomycin  IVPB 750 milliGRAM(s) IV Intermittent every 12 hours      All available historical records have been reviewed

## 2020-02-02 NOTE — CONSULT NOTE ADULT - ASSESSMENT
73 y/o F with REJI in hospital for SOB, hemoptysis, cough.  PMH asthma, COPD not on home O2, autoimmune hepatitis on prednisone and tacrolimus, heterozygous prothrombin gene mutation with hx of PE and DVT on coumadin, recent hospitalization for pneumonia     # REJI   - likely ATN 2/2 hypoperfusion. SBP transiently dropped to 50s.   - serum creatinine stable   - non-oliguric   - BP stable, on levophed. keep MAP > 65   - s/p contrast study on 1/31/20. trend creatinine.   - K WNL.   - ABG noted for high metabolic acidosis plus respiratory acidosis. intubated on vent   - Hb at goal.   - check UA, urine creatinine, Na, Pr/Cr ratio   - avoid nephrotoxins and hypotension   - corrected Ca ~ 7.9 noted. check Ph, iPTH. start CaCO3 500 mg q 12 hours. check Vit D.   - h/o autoimmune hepatitis. on Prednisone, tacrolimus. check Tacrolimus trough 30 min before next dose.   - PNA/sepsis: on ATB. check vanco trough, renal dose adjustment. ID notes appreciated.   - avoid nephrotoxins and hypotension   - will follow

## 2020-02-02 NOTE — DIETITIAN INITIAL EVALUATION ADULT. - ENERGY NEEDS
Estimated energy needs: ~1488kcal/day (PSE 2003b)  Estimated protein needs: 65-82 gm/day (1.2-1.5gm/kg IBW) -  BMI > 25, pt's age and impaired renal function considered (REJI)   Estimated fluid needs: per CCU team

## 2020-02-02 NOTE — PROGRESS NOTE ADULT - ASSESSMENT
IMPRESSION:    Acute hypoxic respiratory failure/ FLU A   DAH doubt  HO Autoimmune hepatitis on tacrolimus level   h/o hypercoagulable state/ vte was on coumadin     PLAN:    CNS: keep fentanyl and nimbex , add versed .     HEENT: ET care     PULMONARY:  HOB @ 45 degrees, (Plateau 37 , PEEP 36 ) VENT Changes - Wean of oxygen to 80 % , Keep PEEP 10 ,  ) Repeat ABG in 1 hour ,  Possible bronchoscopy     CARDIOVASCULAR: I=O.  Keep Lasix     GI: GI prophylaxis. RUQ sono . colace , senna . Hold feeds for bronch    RENAL:  Follow up lytes. Renal sono .  Renal consult . Place henley     INFECTIOUS DISEASE: Follow up cultures, continue vanco/ cefepime/ tamiflu. add levaquin . f/u urine strep Ag.  f/u fungitell    HEMATOLOGICAL:  DVT prophylaxis seq. f/u INR , check  LE Duplex.  FU CBC and Coags.   Vitamin K 2.5 mg IV     ENDOCRINE:  Follow up FS.  Insulin protocol if needed.    MUSCULOSKELETAL: Bed rest     Lines: RIJ TLC  Henley: place henley  Code status: full   Prognosis: Poor prognosis   Continue MICU monitoring for now. IMPRESSION:    Acute hypoxic respiratory failure/ FLU A / ARDS  DAH doubt  HO Autoimmune hepatitis on tacrolimus level   h/o hypercoagulable state/ vte was on coumadin     PLAN:    CNS: keep fentanyl, change precedex to versed dc nimbex, if needed propofol    HEENT: ET care     PULMONARY:  HOB @ 45 degrees, (PEAK 35 , LOPEZ 33 ), driving pressure 23 (accept permissive hypercapnia) per VENT Changes - Wean of oxygen to 80 % , Keep PEEP 10 ,  ) Repeat ABG in 1 hour ,  not stable for bronch today    CARDIOVASCULAR: I=O.  Keep Lasix daily    GI: GI prophylaxis. RUQ sono . colace , senna .     RENAL:  Follow up lytes. Renal sono .  Renal consult . Place henley     INFECTIOUS DISEASE: Follow up cultures, continue vanco/ cefepime/ tamiflu. add levaquin . f/u urine strep Ag.  f/u fungitell,     HEMATOLOGICAL:  DVT prophylaxis seq. f/u INR , check  LE Duplex.  FU CBC and Coags.   Vitamin K 2.5 mg     ENDOCRINE:  Follow up FS.  Insulin protocol if needed.    MUSCULOSKELETAL: Bed rest     Lines: RIJ TLC  Henley: place henley  Code status: full   Prognosis: Poor prognosis   Continue MICU monitoring for now.

## 2020-02-02 NOTE — DIETITIAN INITIAL EVALUATION ADULT. - ENTERAL
when appropriate to start feeds/pt is hemodynamically stable - order Osmolite 1.5@20ml/hr and increase by 10ml q 4hr to goal  of 40ml/hr via OGT, add Prosource TF 1pkt q 24hrs for total of 1480kcal, 71gm protein, 730ml h20, additional fluids per CCU team. Adjust insulin regimen.

## 2020-02-03 NOTE — PROGRESS NOTE ADULT - SUBJECTIVE AND OBJECTIVE BOX
DARNELL, DONI  74y, Female    All available historical data reviewed    OVERNIGHT EVENTS:  none    ROS:  sedated, paralysed  non responsive  on pressors, FiO2 50%  no BM    VITALS:  T(F): 98.8, Max: 98.8 (02-03-20 @ 00:00)  HR: 82  BP: 109/60  RR: 30Vital Signs Last 24 Hrs  T(C): 37.1 (03 Feb 2020 00:00), Max: 37.1 (03 Feb 2020 00:00)  T(F): 98.8 (03 Feb 2020 00:00), Max: 98.8 (03 Feb 2020 00:00)  HR: 82 (03 Feb 2020 06:00) (76 - 92)  BP: 109/60 (03 Feb 2020 06:00) (70/48 - 112/61)  BP(mean): 68 (03 Feb 2020 06:00) (55 - 85)  RR: 30 (03 Feb 2020 06:00) (27 - 32)  SpO2: 92% (03 Feb 2020 06:00) (92% - 100%)    TESTS & MEASUREMENTS:                        11.5   28.61 )-----------( 152      ( 03 Feb 2020 04:30 )             37.4     02-03    141  |  106  |  46<H>  ----------------------------<  89  4.7   |  20  |  2.4<H>    Ca    6.8<L>      03 Feb 2020 04:30  Phos  3.2     02-03  Mg     2.3     02-03    TPro  5.4<L>  /  Alb  2.9<L>  /  TBili  0.3  /  DBili  x   /  AST  18  /  ALT  33  /  AlkPhos  252<H>  02-03    LIVER FUNCTIONS - ( 03 Feb 2020 04:30 )  Alb: 2.9 g/dL / Pro: 5.4 g/dL / ALK PHOS: 252 U/L / ALT: 33 U/L / AST: 18 U/L / GGT: x             Culture - Blood (collected 01-31-20 @ 09:50)  Source: .Blood Blood  Preliminary Report (02-01-20 @ 23:02):    No growth to date.    Culture - Blood (collected 01-31-20 @ 09:50)  Source: .Blood Blood  Preliminary Report (02-01-20 @ 23:02):    No growth to date.            RADIOLOGY & ADDITIONAL TESTS:  Personal review of radiological diagnostics performed  Echo and EKG results noted when applicable.     MEDICATIONS:  ALBUTerol    90 MICROgram(s) HFA Inhaler 1 Puff(s) Inhalation every 6 hours  cefepime   IVPB 2000 milliGRAM(s) IV Intermittent every 12 hours  chlorhexidine 0.12% Liquid 15 milliLiter(s) Oral Mucosa every 12 hours  chlorhexidine 4% Liquid 1 Application(s) Topical <User Schedule>  cyanocobalamin 1000 MICROGram(s) Oral daily  dexMEDEtomidine Infusion 0.2 MICROgram(s)/kG/Hr IV Continuous <Continuous>  dextrose 10%. 250 milliLiter(s) IV Continuous <Continuous>  fentaNYL   Infusion. 0.5 MICROgram(s)/kG/Hr IV Continuous <Continuous>  furosemide   Injectable 20 milliGRAM(s) IV Push daily  gabapentin 300 milliGRAM(s) Oral at bedtime  influenza   Vaccine 0.5 milliLiter(s) IntraMuscular once  insulin regular Infusion 4 Unit(s)/Hr IV Continuous <Continuous>  ipratropium 17 MICROgram(s) HFA Inhaler 1 Puff(s) Inhalation every 6 hours  lactulose Syrup 10 Gram(s) Oral two times a day  levoFLOXacin IVPB      methylPREDNISolone sodium succinate Injectable 60 milliGRAM(s) IV Push two times a day  midazolam Infusion 0.02 mG/kG/Hr IV Continuous <Continuous>  montelukast 10 milliGRAM(s) Oral at bedtime  multivitamin/minerals 1 Tablet(s) Oral daily  norepinephrine Infusion 0.05 MICROgram(s)/kG/Min IV Continuous <Continuous>  oseltamivir 75 milliGRAM(s) Oral two times a day  oxyCODONE    IR 10 milliGRAM(s) Oral every 6 hours PRN  pantoprazole    Tablet 40 milliGRAM(s) Oral before breakfast  propofol Infusion 10 MICROgram(s)/kG/Min IV Continuous <Continuous>  senna 2 Tablet(s) Oral at bedtime  tacrolimus 0.5 milliGRAM(s) Oral every 12 hours  tiotropium 18 MICROgram(s) Capsule 1 Capsule(s) Inhalation daily  vancomycin  IVPB 750 milliGRAM(s) IV Intermittent every 12 hours      ANTIBIOTICS:  cefepime   IVPB 2000 milliGRAM(s) IV Intermittent every 12 hours  levoFLOXacin IVPB      oseltamivir 75 milliGRAM(s) Oral two times a day  vancomycin  IVPB 750 milliGRAM(s) IV Intermittent every 12 hours

## 2020-02-03 NOTE — PROGRESS NOTE ADULT - ASSESSMENT
· Assessment		  ASSESSMENT  75y/o F w/ hx of asthma, COPD not on home O2, autoimmune hepatitis on prednisone and tacrolimus, heterozygous prothrombin gene mutation with hx of PE and DVT on coumadin with recent hospitalization in January 2020 with a diagnosis of pneumonia presents for worsening SOB, hemoptysis and cough.    IMPRESSION  #Flu + AH1, ?superimposed atypical PNA. Recent bronch 1/20 negative for PCP, Fungal, etc, previous bronch cx with yeast (likely oral contaminant) since GMS neg    Procalcitonin, Serum: 1.86: >/= 0.5 but < 2.0: Systemic infection (sepsis) is possible, but otherconditions are known to elevate PCT as well. (02.01.20 @ 20:30)    CTA chest showing no PE but showing interval development of multifocal bilateral groundglass opacities as well as new moderate to severe bronchiectasis in the right lung,   1/13 CT b/l GGOs, compatible with intersitial edema      Serum Pro-Brain Natriuretic Peptide: 722 pg/mL (01.31.20 @ 09:25)<-- Serum Pro-Brain Natriuretic Peptide: 345 pg/mL (01.13.20 @ 12:10)    During last hospitalization: bronch cx bacterial NG, +yeast, pending ID, neg legionella, + MRSA PCR    MRSA PCR Result.: Positive (02-01-20 @ 20:40)  #Severe sepsis on admission P>90, WBC 19, RR>20, lactic acidosis Lactate, Blood: 2.9 mmol/L (01-31-20 @ 09:25)    afebrile   #Elevated Alk ph, transaminitis  #LLE wound, not infection     12/7 WCX MSSA, Kleb, bacteroides    8/2019 MRSA in a wound  #Immunosuppressed: autoimmune hepatitis on prednisone and tacrolimus    RECOMMENDATIONS  - oseltamivir 75 milliGRAM(s) Oral two times a day would treat 7-10 days given immunocompromised   - send serum fungitell, cryptococcal serum Ag, CMV serum PCR (+IgM and IgG last admission)  - Recommend bronch: send cx, fungal, AFB, PCP stain, viral cx, Toxo PCR  - doubt MRSA PNA, Imaging not really consistent with bacterial PNA,   - levaquin 750mg q24h IV for atypical coverage  - cefepime   IVPB 2000 milliGRAM(s) IV Intermittent every 12 hours  - LDH

## 2020-02-03 NOTE — PROGRESS NOTE ADULT - ASSESSMENT
IMPRESSION:    Acute hypoxic respiratory failure/ FLU A / ARDS  Possible DAH  REJI  HO Autoimmune hepatitis on tacrolimus level   h/o hypercoagulable state/ vte was on coumadin     PLAN:    CNS: keep fentanyl and versed for now    HEENT: ET care     PULMONARY:  HOB @ 45 degrees, Plan for bronchoscopy today.     CARDIOVASCULAR: I=O.  hold lasix today    GI: GI prophylaxis. RUQ sono . colace , senna .     RENAL:  Follow up lytes. replete as needed. Keep Hogan    INFECTIOUS DISEASE: Follow up cultures, continue vanco/ cefepime/ tamiflu. and levaquin . f/u urine strep Ag.  f/u fungitell     HEMATOLOGICAL:  DVT prophylaxis seq. f/u INR , check  LE Duplex.  FU CBC and Coags.     ENDOCRINE:  Follow up FS.  Insulin protocol if needed.    MUSCULOSKELETAL: Bed rest     Lines: RIJ TLC  Code status: full   Prognosis: Poor prognosis   Continue MICU monitoring for now. IMPRESSION:    Acute hypoxic respiratory failure/ FLU A / ARDS  Possible DAH  REJI  HO Autoimmune hepatitis on tacrolimus level   h/o hypercoagulable state/ vte was on coumadin     PLAN:    CNS: keep fentanyl and versed for now    HEENT: ET care     PULMONARY:  HOB @ 45 degrees, Plan for bronchoscopy today. CW solumedrol iV    CARDIOVASCULAR: I=O.  hold lasix today    GI: GI prophylaxis. RUQ sono . colace , senna .     RENAL:  Follow up lytes. replete as needed. Keep Hogan    INFECTIOUS DISEASE: Follow up cultures, continue vanco/ cefepime/ tamiflu. and levaquin . f/u urine strep Ag.  f/u fungitell     HEMATOLOGICAL:  DVT prophylaxis seq. f/u INR , check  LE Duplex.  FU CBC and Coags.     ENDOCRINE:  Follow up FS.  Insulin protocol if needed.    MUSCULOSKELETAL: Bed rest     Lines: RIJ TLC  Code status: full   Prognosis: Poor prognosis   Continue MICU monitoring for now. IMPRESSION:    Acute hypoxic respiratory failure/ FLU A / ARDS  Possible DAH  REJI  HO Autoimmune hepatitis on tacrolimus level   h/o hypercoagulable state/ vte was on coumadin     PLAN:    CNS: keep fentanyl and versed for now    HEENT: ET care     PULMONARY:  HOB @ 45 degrees. Solumedrol 250mg q6h for now    CARDIOVASCULAR: I=O.  hold lasix today    GI: GI prophylaxis. RUQ sono . colace , senna .     RENAL:  Follow up lytes. replete as needed. Keep Hogan    INFECTIOUS DISEASE: Follow up cultures, continue vanco/ cefepime/ tamiflu. and levaquin . f/u urine strep Ag.  f/u fungitell     HEMATOLOGICAL:  DVT prophylaxis seq. f/u INR , check  LE Duplex.  FU CBC and Coags.     ENDOCRINE:  Follow up FS.  Insulin protocol if needed.    MUSCULOSKELETAL: Bed rest     Lines: RIJ TLC  Code status: full   Prognosis: Poor prognosis   Continue MICU monitoring for now. IMPRESSION:    Acute hypoxic respiratory failure\  DAH  Influenza A  ARDS  REJI non oliuguric   HO Autoimmune hepatitis on tacrolimus and chronic steroids   h/o hypercoagulable state/ vte was on coumadin     PLAN:    CNS: keep fentanyl and versed for now until bronchoscopy today    HEENT: ET care.  Oral care     PULMONARY:  HOB @ 45 degrees. Solumedrol 250mg q6h for now.  Increase PEEP to 12.5.  FU PPl. and Driving Pressure     CARDIOVASCULAR: I=O.  DC Lasix     GI: GI prophylaxis. RUQ sono . colace , senna.  OG feeding     RENAL:  Follow up lytes. Replete as needed. Keep Hogan.  FU with Renal     INFECTIOUS DISEASE: Follow up cultures.  FIU BAL>  Adjust ABX to GFR      HEMATOLOGICAL:  DVT prophylaxis seq. f/u INR , check  LE Duplex.  FU CBC and Coags. DDAVP     ENDOCRINE:  Follow up FS.  Insulin protocol if needed.    MUSCULOSKELETAL: Bed rest     Code status: full   Prognosis: Poor prognosis   Continue MICU monitoring for now.

## 2020-02-03 NOTE — PROGRESS NOTE ADULT - ASSESSMENT
75 y/o female with pmhx of asthma, HTN,  autoimmune hepatitis, heterozygous prothrombin gene mutation with hx of PE and DVT on coumadin admitted for SOB       # Acute hypoxic resp failure due to flu +ve and possible bacterial infection and to r/u alveolar hemorrhage   - intubated and sedated  - for Bronch likely today  - C/w Solu-medrol 60 mg iV q12  - C/w Vanc, cefepime and levaquin  - oseltamivir 75 mg BID for 10 days   - C/w Duoneb q6h and q4h PRN  - F/up autoimmune panel ( TEE, RF, GBM, ANCA, IgA endomysial ab, APS panel)        #Immunosuppressed: autoimmune hepatitis on prednisone and tacrolimus      # Autoimmune hepatitis:  - On prednisone 60 mg PO qd at home  - c/w methylprednisolone 60 mg iv BID   - C/w tacrolimus 0.5 q12h    # Recent CMV infection in January 2020:  - In January 2017, CMV sent because patient is immunosuppressed treated with supportive therapy  - No need for further workup    # HTN:  -monitor BP  - C/w amlodipine 10 mg qd PO holding now due to hypotension  - Patient on lopressor 50 mg PO qd ( to clarify that), holding now due to hypotension    # heterozygous prothrombin gene mutation with hx of PE and DVT on coumadin:  - holding coumadin for supratherapeutic INR  - Resume coumadin once INR <3    #0.5 cm of GB polyp:  -needs f/u US in 12 months     # Hx of asthma:  - C/w montelukast qd    # GI PPx: Protonix 40 mg PO qd  # DVT PPx: sequentials  # Activity: bedrest  # Dispo: ICU for now  # Code Status: FULL 73 y/o female with pmhx of asthma, HTN,  autoimmune hepatitis, heterozygous prothrombin gene mutation with hx of PE and DVT on coumadin admitted for SOB       # Acute hypoxic resp failure due to flu +ve and possible bacterial infection and to r/u alveolar hemorrhage   - intubated and sedated  - for Bronch likely today  - C/w Solu-medrol 60 mg iV q12  - C/w Vanc, cefepime and levaquin  - oseltamivir 75 mg BID for 10 days   - C/w Duoneb q6h and q4h PRN  - F/up autoimmune panel ( TEE, RF, GBM, ANCA, IgA endomysial ab, APS panel)    #REJI likely ATN:  -holding Lasix for now   -f/u with nephro    #Immunosuppressed: autoimmune hepatitis on prednisone and tacrolimus      # Autoimmune hepatitis:  - On prednisone 60 mg PO qd at home  - c/w methylprednisolone 60 mg iv BID   - C/w tacrolimus 0.5 q12h    # Recent CMV infection in January 2020:  - pending CMV PCR    # HTN:  -now hypotension   - was on amlodipine 10 mg qd and lopressor 50 mg PO qd holding now due to hypotension      # heterozygous prothrombin gene mutation with hx of PE and DVT on coumadin:  - holding coumadin for nowm suspected alveolar hemorrhage     #0.5 cm of GB polyp:  -needs f/u US in 12 months     # Hx of asthma:  - C/w montelukast qd    # GI PPx: Protonix 40 mg PO qd  # DVT PPx: sequentials  # Activity: bedrest  # Dispo: ICU for now  # Code Status: FULL

## 2020-02-03 NOTE — PROGRESS NOTE ADULT - SUBJECTIVE AND OBJECTIVE BOX
Patient is a 74y old  Female who presents with a chief complaint of SOB and desaturation (03 Feb 2020 12:16)      OVERNIGHT EVENTS: remained intubated, sedated and on Levophed     SUBJECTIVE / INTERVAL HPI: Patient seen and examined at bedside.     VITAL SIGNS:  Vital Signs Last 24 Hrs  T(C): 37.1 (03 Feb 2020 12:00), Max: 37.1 (03 Feb 2020 00:00)  T(F): 98.7 (03 Feb 2020 12:00), Max: 98.8 (03 Feb 2020 00:00)  HR: 82 (03 Feb 2020 12:00) (80 - 92)  BP: 91/67 (03 Feb 2020 12:00) (91/67 - 118/67)  BP(mean): 84 (03 Feb 2020 12:00) (68 - 86)  RR: 32 (03 Feb 2020 10:00) (27 - 33)  SpO2: 96% (03 Feb 2020 12:00) (92% - 100%)    PHYSICAL EXAM:    General: WDWN  HEENT: NC/AT; PERRL, clear conjunctiva  Neck: supple  Cardiovascular: +S1/S2; RRR  Respiratory: CTA b/l; no W/R/R  Gastrointestinal: soft, NT/ND; +BSx4  Extremities: WWP; 2+ peripheral pulses; no edema   Neurological: no focal deficits    MEDICATIONS:  MEDICATIONS  (STANDING):  ALBUTerol    90 MICROgram(s) HFA Inhaler 1 Puff(s) Inhalation every 6 hours  calcium carbonate    500 mG (Tums) Chewable 2 Tablet(s) Chew every 8 hours  cefepime   IVPB 2000 milliGRAM(s) IV Intermittent every 12 hours  chlorhexidine 0.12% Liquid 15 milliLiter(s) Oral Mucosa every 12 hours  chlorhexidine 4% Liquid 1 Application(s) Topical <User Schedule>  cyanocobalamin 1000 MICROGram(s) Oral daily  dexMEDEtomidine Infusion 0.2 MICROgram(s)/kG/Hr (3.77 mL/Hr) IV Continuous <Continuous>  dextrose 10%. 250 milliLiter(s) (100 mL/Hr) IV Continuous <Continuous>  fentaNYL   Infusion. 0.5 MICROgram(s)/kG/Hr (3.77 mL/Hr) IV Continuous <Continuous>  gabapentin 300 milliGRAM(s) Oral at bedtime  influenza   Vaccine 0.5 milliLiter(s) IntraMuscular once  insulin regular Infusion 4 Unit(s)/Hr (4 mL/Hr) IV Continuous <Continuous>  ipratropium 17 MICROgram(s) HFA Inhaler 1 Puff(s) Inhalation every 6 hours  lactulose Syrup 10 Gram(s) Oral two times a day  levoFLOXacin IVPB      methylPREDNISolone sodium succinate Injectable 60 milliGRAM(s) IV Push two times a day  midazolam Infusion 0.02 mG/kG/Hr (1.508 mL/Hr) IV Continuous <Continuous>  montelukast 10 milliGRAM(s) Oral at bedtime  multivitamin/minerals 1 Tablet(s) Oral daily  norepinephrine Infusion 0.05 MICROgram(s)/kG/Min (3.534 mL/Hr) IV Continuous <Continuous>  oseltamivir 75 milliGRAM(s) Oral two times a day  pantoprazole    Tablet 40 milliGRAM(s) Oral before breakfast  propofol Infusion 10 MICROgram(s)/kG/Min (4.524 mL/Hr) IV Continuous <Continuous>  senna 2 Tablet(s) Oral at bedtime  tacrolimus 0.5 milliGRAM(s) Oral every 12 hours  tiotropium 18 MICROgram(s) Capsule 1 Capsule(s) Inhalation daily    MEDICATIONS  (PRN):  oxyCODONE    IR 10 milliGRAM(s) Oral every 6 hours PRN Severe Pain (7 - 10)      ALLERGIES:  Allergies    No Known Allergies    Intolerances        LABS:                        11.5   28.61 )-----------( 152      ( 03 Feb 2020 04:30 )             37.4     02-03    141  |  106  |  46<H>  ----------------------------<  89  4.7   |  20  |  2.4<H>    Ca    6.8<L>      03 Feb 2020 04:30  Phos  3.2     02-03  Mg     2.3     02-03    TPro  5.4<L>  /  Alb  2.9<L>  /  TBili  0.3  /  DBili  x   /  AST  18  /  ALT  33  /  AlkPhos  252<H>  02-03    PT/INR - ( 02 Feb 2020 04:30 )   PT: >40.00 sec;   INR: 3.66 ratio         PTT - ( 02 Feb 2020 04:30 )  PTT:29.3 sec    CAPILLARY BLOOD GLUCOSE      POCT Blood Glucose.: 130 mg/dL (03 Feb 2020 13:45)      < from: US Abdomen Limited (02.02.20 @ 12:01) >  IMPRESSION:    Gallbladder polyp. Otherwise unremarkable right upper quadrant sonogram.    < end of copied text >  < from: US Renal (02.02.20 @ 11:59) >  Urinary bladder decompressed around Hogan catheter limiting evaluation.    IMPRESSION:    No hydronephrosis bilaterally.    < end of copied text >  < from: [facility] Duplex Lower Ext Vein Scan, Bilat (02.01.20 @ 19:15) >      Impression:    No evidence of deep venous thrombosis in the bilateral lower extremities.    < end of copied text >  Culture - Blood (01.31.20 @ 09:50)    Specimen Source: .Blood Blood    Culture Results:   No growth to date.

## 2020-02-03 NOTE — PROGRESS NOTE ADULT - SUBJECTIVE AND OBJECTIVE BOX
Patient is a 74y old  Female who presents with a chief complaint of SOB and desaturation (03 Feb 2020 07:56)        Over Night Events:  No events overnight      ROS:     CONSTITUTIONAL:   no fever   no chills.  no weight gain   no weight loss    EYES:   no discharge,   no pain  no redness,   no visual changes.    ENT:   Ears: no ear pain and no hearing problems.  Nose: no nasal congestion and no nasal drainage.  Mouth/Throat: no dysphagia,  no hoarseness and no throat pain.  Neck: no lumps, no pain, no stiffness and no swollen glands.     CARDIOVASCULAR:   no chest pain,   no swelling  no palpitaions  no syncope    RESPIRATORY:  no SOB,  no wheezing ,  no respiratory difficulty  no sputum production    GASTROINTESTINAL:   no abdominal pain,   no constipation,   no diarrhea,   no vomiting.    GENITOURINARY:  no dysuria,   no frequency,   no urgency  no hematuria.    MUSCULOSKELETAL:   no back pain,   no musculoskeletal pain,  no weakness.    SKIN:   no jaundice,   no lesions,   no pruritis,   no rashes.    NEURO:   no loss of consciousness,   no gait abnormality,   no headache,   no sensory deficits,   no weakness.    PSYCHIATRIC:   no known mental health issues  no anxiety  no depression    ALLERGIC/IMMUNOLOGIC:   No active allergic or immunologic issues        PHYSICAL EXAM    ICU Vital Signs Last 24 Hrs  T(C): 37.1 (03 Feb 2020 00:00), Max: 37.1 (03 Feb 2020 00:00)  T(F): 98.8 (03 Feb 2020 00:00), Max: 98.8 (03 Feb 2020 00:00)  HR: 81 (03 Feb 2020 10:00) (76 - 92)  BP: 117/63 (03 Feb 2020 10:00) (70/48 - 118/67)  BP(mean): 83 (03 Feb 2020 10:00) (55 - 86)  ABP: --  ABP(mean): --  RR: 32 (03 Feb 2020 10:00) (27 - 33)  SpO2: 99% (03 Feb 2020 10:00) (92% - 100%) Vent      CONSTITUTIONAL:   Ill appearing.  NAD    ENT:   Airway patent,   Mouth with normal mucosa.   No thrush    CARDIAC:   Normal rate,   Regular rhythm.     No edema      RESPIRATORY:   diffuse bilateral crackles  Normal chest expansion  Not tachypneic,  No use of accessory muscles    GASTROINTESTINAL:  Abdomen soft,   Non-tender,   No guarding,   + BS    MUSCULOSKELETAL:   Range of motion is not limited,  No clubbing, cyanosis    NEUROLOGICAL:   Sedated    SKIN:   Skin normal color for race,   warm, dry   No evidence of rash.        02-02-20 @ 07:01  -  02-03-20 @ 07:00  --------------------------------------------------------  IN:    cisatracurium Infusion: 12 mL    dexmedetomidine Infusion: 40 mL    dextrose 10%.: 250 mL    fentaNYL  Infusion: 200 mL    fentaNYL Infusion.: 400 mL    insulin regular Infusion: 98 mL    IV PiggyBack: 900 mL    midazolam Infusion: 96 mL    midazolam Infusion: 63 mL    norepinephrine Infusion: 400 mL  Total IN: 2459 mL    OUT:    Ureteral Catheter: 750 mL  Total OUT: 750 mL    Total NET: 1709 mL      02-03-20 @ 07:01  -  02-03-20 @ 12:17  --------------------------------------------------------  IN:    fentaNYL Infusion.: 100 mL    insulin regular Infusion: 8 mL    midazolam Infusion: 24 mL    norepinephrine Infusion: 80 mL  Total IN: 212 mL    OUT:    Ureteral Catheter: 75 mL  Total OUT: 75 mL    Total NET: 137 mL          LABS:                            11.5   28.61 )-----------( 152      ( 03 Feb 2020 04:30 )             37.4                                               02-03    141  |  106  |  46<H>  ----------------------------<  89  4.7   |  20  |  2.4<H>    Ca    6.8<L>      03 Feb 2020 04:30  Phos  3.2     02-03  Mg     2.3     02-03    TPro  5.4<L>  /  Alb  2.9<L>  /  TBili  0.3  /  DBili  x   /  AST  18  /  ALT  33  /  AlkPhos  252<H>  02-03      PT/INR - ( 02 Feb 2020 04:30 )   PT: >40.00 sec;   INR: 3.66 ratio         PTT - ( 02 Feb 2020 04:30 )  PTT:29.3 sec                                                                                     LIVER FUNCTIONS - ( 03 Feb 2020 04:30 )  Alb: 2.9 g/dL / Pro: 5.4 g/dL / ALK PHOS: 252 U/L / ALT: 33 U/L / AST: 18 U/L / GGT: x                                                                                               Mode: AC/ CMV (Assist Control/ Continuous Mandatory Ventilation)  RR (machine): 30  TV (machine): 380  FiO2: 50  PEEP: 10  ITime: 1  MAP: 17  PIP: 29                                      ABG - ( 03 Feb 2020 04:01 )  pH, Arterial: 7.25  pH, Blood: x     /  pCO2: 48    /  pO2: 56    / HCO3: 21    / Base Excess: -6.3  /  SaO2: 88                  MEDICATIONS  (STANDING):  ALBUTerol    90 MICROgram(s) HFA Inhaler 1 Puff(s) Inhalation every 6 hours  cefepime   IVPB 2000 milliGRAM(s) IV Intermittent every 12 hours  chlorhexidine 0.12% Liquid 15 milliLiter(s) Oral Mucosa every 12 hours  chlorhexidine 4% Liquid 1 Application(s) Topical <User Schedule>  cyanocobalamin 1000 MICROGram(s) Oral daily  dexMEDEtomidine Infusion 0.2 MICROgram(s)/kG/Hr (3.77 mL/Hr) IV Continuous <Continuous>  dextrose 10%. 250 milliLiter(s) (100 mL/Hr) IV Continuous <Continuous>  fentaNYL   Infusion. 0.5 MICROgram(s)/kG/Hr (3.77 mL/Hr) IV Continuous <Continuous>  furosemide   Injectable 20 milliGRAM(s) IV Push daily  gabapentin 300 milliGRAM(s) Oral at bedtime  influenza   Vaccine 0.5 milliLiter(s) IntraMuscular once  insulin regular Infusion 4 Unit(s)/Hr (4 mL/Hr) IV Continuous <Continuous>  ipratropium 17 MICROgram(s) HFA Inhaler 1 Puff(s) Inhalation every 6 hours  lactulose Syrup 10 Gram(s) Oral two times a day  levoFLOXacin IVPB      methylPREDNISolone sodium succinate Injectable 60 milliGRAM(s) IV Push two times a day  midazolam Infusion 0.02 mG/kG/Hr (1.508 mL/Hr) IV Continuous <Continuous>  montelukast 10 milliGRAM(s) Oral at bedtime  multivitamin/minerals 1 Tablet(s) Oral daily  norepinephrine Infusion 0.05 MICROgram(s)/kG/Min (3.534 mL/Hr) IV Continuous <Continuous>  oseltamivir 75 milliGRAM(s) Oral two times a day  pantoprazole    Tablet 40 milliGRAM(s) Oral before breakfast  propofol Infusion 10 MICROgram(s)/kG/Min (4.524 mL/Hr) IV Continuous <Continuous>  senna 2 Tablet(s) Oral at bedtime  tacrolimus 0.5 milliGRAM(s) Oral every 12 hours  tiotropium 18 MICROgram(s) Capsule 1 Capsule(s) Inhalation daily  vancomycin  IVPB 750 milliGRAM(s) IV Intermittent every 12 hours    MEDICATIONS  (PRN):  oxyCODONE    IR 10 milliGRAM(s) Oral every 6 hours PRN Severe Pain (7 - 10) Patient is a 74y old  Female who presents with a chief complaint of SOB and desaturation (03 Feb 2020 07:56)        Over Night Events:  Remains critically ill on MV.  on Pressors.  Sedated        ROS:     CONSTITUTIONAL:   no fever   no chills.  no weight gain   no weight loss    EYES:   no discharge,   no pain  no redness,   no visual changes.    ENT:   Ears: no ear pain and no hearing problems.  Nose: no nasal congestion and no nasal drainage.  Mouth/Throat: no dysphagia,  no hoarseness and no throat pain.  Neck: no lumps, no pain, no stiffness and no swollen glands.     CARDIOVASCULAR:   no chest pain,   no swelling  no palpitations  no syncope    RESPIRATORY:  Per HPI     GASTROINTESTINAL:   no abdominal pain,   no constipation,   no diarrhea,   no vomiting.    GENITOURINARY:  no dysuria,   no frequency,   no urgency  no hematuria.    MUSCULOSKELETAL:   no back pain,   no musculoskeletal pain,  no weakness.    SKIN:   no jaundice,   no lesions,   no pruritis,   no rashes.    NEURO:   no loss of consciousness,   no gait abnormality,   no headache,   no sensory deficits,   no weakness.    PSYCHIATRIC:   no known mental health issues  no anxiety  no depression    ALLERGIC/IMMUNOLOGIC:   No active allergic or immunologic issues        PHYSICAL EXAM    ICU Vital Signs Last 24 Hrs  T(C): 37.1 (03 Feb 2020 00:00), Max: 37.1 (03 Feb 2020 00:00)  T(F): 98.8 (03 Feb 2020 00:00), Max: 98.8 (03 Feb 2020 00:00)  HR: 81 (03 Feb 2020 10:00) (76 - 92)  BP: 117/63 (03 Feb 2020 10:00) (70/48 - 118/67)  BP(mean): 83 (03 Feb 2020 10:00) (55 - 86)  ABP: --  ABP(mean): --  RR: 32 (03 Feb 2020 10:00) (27 - 33)  SpO2: 99% (03 Feb 2020 10:00) (92% - 100%) Vent      CONSTITUTIONAL:   Ill appearing.  NAD    ENT:   + ET   Mouth with normal mucosa.   No thrush    CARDIAC:   Normal rate,   Regular rhythm.     No edema      RESPIRATORY:   diffuse bilateral crackles  Normal chest expansion  Not tachypneic,  No use of accessory muscles    GASTROINTESTINAL:  Abdomen soft,   Non-tender,   No guarding,   + BS    MUSCULOSKELETAL:   Range of motion is not limited,  No clubbing, cyanosis    NEUROLOGICAL:   Sedated  Moves extremities to pain     SKIN:   Skin normal color for race,   warm, dry   No evidence of rash.        02-02-20 @ 07:01  -  02-03-20 @ 07:00  --------------------------------------------------------  IN:    cisatracurium Infusion: 12 mL    dexmedetomidine Infusion: 40 mL    dextrose 10%.: 250 mL    fentaNYL  Infusion: 200 mL    fentaNYL Infusion.: 400 mL    insulin regular Infusion: 98 mL    IV PiggyBack: 900 mL    midazolam Infusion: 96 mL    midazolam Infusion: 63 mL    norepinephrine Infusion: 400 mL  Total IN: 2459 mL    OUT:    Ureteral Catheter: 750 mL  Total OUT: 750 mL    Total NET: 1709 mL      02-03-20 @ 07:01  -  02-03-20 @ 12:17  --------------------------------------------------------  IN:    fentaNYL Infusion.: 100 mL    insulin regular Infusion: 8 mL    midazolam Infusion: 24 mL    norepinephrine Infusion: 80 mL  Total IN: 212 mL    OUT:    Ureteral Catheter: 75 mL  Total OUT: 75 mL    Total NET: 137 mL          LABS:                            11.5   28.61 )-----------( 152      ( 03 Feb 2020 04:30 )             37.4                                               02-03    141  |  106  |  46<H>  ----------------------------<  89  4.7   |  20  |  2.4<H>    Ca    6.8<L>      03 Feb 2020 04:30  Phos  3.2     02-03  Mg     2.3     02-03    TPro  5.4<L>  /  Alb  2.9<L>  /  TBili  0.3  /  DBili  x   /  AST  18  /  ALT  33  /  AlkPhos  252<H>  02-03      PT/INR - ( 02 Feb 2020 04:30 )   PT: >40.00 sec;   INR: 3.66 ratio         PTT - ( 02 Feb 2020 04:30 )  PTT:29.3 sec                                                                                     LIVER FUNCTIONS - ( 03 Feb 2020 04:30 )  Alb: 2.9 g/dL / Pro: 5.4 g/dL / ALK PHOS: 252 U/L / ALT: 33 U/L / AST: 18 U/L / GGT: x                                                                                               Mode: AC/ CMV (Assist Control/ Continuous Mandatory Ventilation)  RR (machine): 30  TV (machine): 380  FiO2: 50  PEEP: 10  ITime: 1  MAP: 17  PIP: 29                                      ABG - ( 03 Feb 2020 04:01 )  pH, Arterial: 7.25  pH, Blood: x     /  pCO2: 48    /  pO2: 56    / HCO3: 21    / Base Excess: -6.3  /  SaO2: 88                  MEDICATIONS  (STANDING):  ALBUTerol    90 MICROgram(s) HFA Inhaler 1 Puff(s) Inhalation every 6 hours  cefepime   IVPB 2000 milliGRAM(s) IV Intermittent every 12 hours  chlorhexidine 0.12% Liquid 15 milliLiter(s) Oral Mucosa every 12 hours  chlorhexidine 4% Liquid 1 Application(s) Topical <User Schedule>  cyanocobalamin 1000 MICROGram(s) Oral daily  dexMEDEtomidine Infusion 0.2 MICROgram(s)/kG/Hr (3.77 mL/Hr) IV Continuous <Continuous>  dextrose 10%. 250 milliLiter(s) (100 mL/Hr) IV Continuous <Continuous>  fentaNYL   Infusion. 0.5 MICROgram(s)/kG/Hr (3.77 mL/Hr) IV Continuous <Continuous>  furosemide   Injectable 20 milliGRAM(s) IV Push daily  gabapentin 300 milliGRAM(s) Oral at bedtime  influenza   Vaccine 0.5 milliLiter(s) IntraMuscular once  insulin regular Infusion 4 Unit(s)/Hr (4 mL/Hr) IV Continuous <Continuous>  ipratropium 17 MICROgram(s) HFA Inhaler 1 Puff(s) Inhalation every 6 hours  lactulose Syrup 10 Gram(s) Oral two times a day  levoFLOXacin IVPB      methylPREDNISolone sodium succinate Injectable 60 milliGRAM(s) IV Push two times a day  midazolam Infusion 0.02 mG/kG/Hr (1.508 mL/Hr) IV Continuous <Continuous>  montelukast 10 milliGRAM(s) Oral at bedtime  multivitamin/minerals 1 Tablet(s) Oral daily  norepinephrine Infusion 0.05 MICROgram(s)/kG/Min (3.534 mL/Hr) IV Continuous <Continuous>  oseltamivir 75 milliGRAM(s) Oral two times a day  pantoprazole    Tablet 40 milliGRAM(s) Oral before breakfast  propofol Infusion 10 MICROgram(s)/kG/Min (4.524 mL/Hr) IV Continuous <Continuous>  senna 2 Tablet(s) Oral at bedtime  tacrolimus 0.5 milliGRAM(s) Oral every 12 hours  tiotropium 18 MICROgram(s) Capsule 1 Capsule(s) Inhalation daily  vancomycin  IVPB 750 milliGRAM(s) IV Intermittent every 12 hours    MEDICATIONS  (PRN):  oxyCODONE    IR 10 milliGRAM(s) Oral every 6 hours PRN Severe Pain (7 - 10)

## 2020-02-03 NOTE — PROGRESS NOTE ADULT - SUBJECTIVE AND OBJECTIVE BOX
[facility] FOLLOW UP NOTE  --------------------------------------------------------------------------------  Chief Complaint:    24 hour events/subjective:        PAST HISTORY  --------------------------------------------------------------------------------  No significant changes to PMH, PSH, FHx, SHx, unless otherwise noted    ALLERGIES & MEDICATIONS  --------------------------------------------------------------------------------  Allergies    No Known Allergies    Intolerances      Standing Inpatient Medications  ALBUTerol    90 MICROgram(s) HFA Inhaler 1 Puff(s) Inhalation every 6 hours  cefepime   IVPB 2000 milliGRAM(s) IV Intermittent every 12 hours  chlorhexidine 0.12% Liquid 15 milliLiter(s) Oral Mucosa every 12 hours  chlorhexidine 4% Liquid 1 Application(s) Topical <User Schedule>  cyanocobalamin 1000 MICROGram(s) Oral daily  dexMEDEtomidine Infusion 0.2 MICROgram(s)/kG/Hr IV Continuous <Continuous>  dextrose 10%. 250 milliLiter(s) IV Continuous <Continuous>  fentaNYL   Infusion. 0.5 MICROgram(s)/kG/Hr IV Continuous <Continuous>  furosemide   Injectable 20 milliGRAM(s) IV Push daily  gabapentin 300 milliGRAM(s) Oral at bedtime  influenza   Vaccine 0.5 milliLiter(s) IntraMuscular once  insulin regular Infusion 4 Unit(s)/Hr IV Continuous <Continuous>  ipratropium 17 MICROgram(s) HFA Inhaler 1 Puff(s) Inhalation every 6 hours  lactulose Syrup 10 Gram(s) Oral two times a day  levoFLOXacin IVPB      methylPREDNISolone sodium succinate Injectable 60 milliGRAM(s) IV Push two times a day  midazolam Infusion 0.02 mG/kG/Hr IV Continuous <Continuous>  montelukast 10 milliGRAM(s) Oral at bedtime  multivitamin/minerals 1 Tablet(s) Oral daily  norepinephrine Infusion 0.05 MICROgram(s)/kG/Min IV Continuous <Continuous>  oseltamivir 75 milliGRAM(s) Oral two times a day  pantoprazole    Tablet 40 milliGRAM(s) Oral before breakfast  propofol Infusion 10 MICROgram(s)/kG/Min IV Continuous <Continuous>  senna 2 Tablet(s) Oral at bedtime  tacrolimus 0.5 milliGRAM(s) Oral every 12 hours  tiotropium 18 MICROgram(s) Capsule 1 Capsule(s) Inhalation daily  vancomycin  IVPB 750 milliGRAM(s) IV Intermittent every 12 hours    PRN Inpatient Medications  oxyCODONE    IR 10 milliGRAM(s) Oral every 6 hours PRN      REVIEW OF SYSTEMS  --------------------------------------------------------------------------------  Gen: No weight changes, fatigue, fevers/chills, weakness  Skin: No rashes  Head/Eyes/Ears/Mouth: No headache; Normal hearing; Normal vision w/o blurriness; No sinus pain/discomfort, sore throat  Respiratory: No dyspnea, cough, wheezing, hemoptysis  CV: No chest pain, PND, orthopnea  GI: No abdominal pain, diarrhea, constipation, nausea, vomiting, melena, hematochezia  : No increased frequency, dysuria, hematuria, nocturia  MSK: No joint pain/swelling; no back pain; no edema  Neuro: No dizziness/lightheadedness, weakness, seizures, numbness, tingling  Heme: No easy bruising or bleeding  Endo: No heat/cold intolerance  Psych: No significant nervousness, anxiety, stress, depression    All other systems were reviewed and are negative, except as noted.    VITALS/PHYSICAL EXAM  --------------------------------------------------------------------------------  T(C): 37.1 (02-03-20 @ 00:00), Max: 37.1 (02-03-20 @ 00:00)  HR: 82 (02-03-20 @ 06:00) (76 - 92)  BP: 109/60 (02-03-20 @ 06:00) (70/48 - 112/61)  RR: 30 (02-03-20 @ 06:00) (27 - 32)  SpO2: 92% (02-03-20 @ 06:00) (92% - 100%)  Wt(kg): --        02-02-20 @ 07:01  -  02-03-20 @ 07:00  --------------------------------------------------------  IN: 2459 mL / OUT: 750 mL / NET: 1709 mL      Physical Exam:  	Gen: intubated/ventilated   	Pulm:  B/L xiomara at bases   	CV:S1S2; no rub  	Abd: +distended  	LE: no edema        LABS/STUDIES  --------------------------------------------------------------------------------              11.5   28.61 >-----------<  152      [02-03-20 @ 04:30]              37.4     141  |  106  |  46  ----------------------------<  89      [02-03-20 @ 04:30]  4.7   |  20  |  2.4        Ca     6.8     [02-03-20 @ 04:30]      Mg     2.3     [02-03-20 @ 04:30]      Phos  3.2     [02-03-20 @ 04:30]    TPro  5.4  /  Alb  2.9  /  TBili  0.3  /  DBili  x   /  AST  18  /  ALT  33  /  AlkPhos  252  [02-03-20 @ 04:30]    PT/INR: PT >40.00, INR 3.66       [02-02-20 @ 04:30]  PTT: 29.3       [02-02-20 @ 04:30]          [02-03-20 @ 04:30]    Creatinine Trend:  SCr 2.4 [02-03 @ 04:30]  SCr 2.1 [02-02 @ 17:11]  SCr 2.0 [02-02 @ 11:00]  SCr 2.0 [02-02 @ 07:50]  SCr 2.0 [02-02 @ 04:30]    Urinalysis - [01-31-20 @ 17:11]      Color Yellow / Appearance Slightly Turbid / SG 1.025 / pH 6.0      Gluc 100 mg/dL / Ketone Small  / Bili Negative / Urobili <2 mg/dL       Blood Small / Protein 100 mg/dL / Leuk Est Negative / Nitrite Negative      RBC 1 / WBC 9 / Hyaline 14 / Gran  / Sq Epi  / Non Sq Epi 8 / Bacteria Negative      HbA1c 6.9      [03-12-18 @ 04:54]  TSH 0.57      [02-01-20 @ 04:46]  Lipid: chol 203, , HDL 74,       [02-01-20 @ 04:46]      Rheumatoid Factor 59      [02-01-20 @ 04:46] seen and examined   intubated/ventilated  on pressors  PAST HISTORY  --------------------------------------------------------------------------------  No significant changes to PMH, PSH, FHx, SHx, unless otherwise noted    ALLERGIES & MEDICATIONS  --------------------------------------------------------------------------------  Allergies    No Known Allergies    Intolerances      Standing Inpatient Medications  ALBUTerol    90 MICROgram(s) HFA Inhaler 1 Puff(s) Inhalation every 6 hours  cefepime   IVPB 2000 milliGRAM(s) IV Intermittent every 12 hours  chlorhexidine 0.12% Liquid 15 milliLiter(s) Oral Mucosa every 12 hours  chlorhexidine 4% Liquid 1 Application(s) Topical <User Schedule>  cyanocobalamin 1000 MICROGram(s) Oral daily  dexMEDEtomidine Infusion 0.2 MICROgram(s)/kG/Hr IV Continuous <Continuous>  dextrose 10%. 250 milliLiter(s) IV Continuous <Continuous>  fentaNYL   Infusion. 0.5 MICROgram(s)/kG/Hr IV Continuous <Continuous>  furosemide   Injectable 20 milliGRAM(s) IV Push daily  gabapentin 300 milliGRAM(s) Oral at bedtime  influenza   Vaccine 0.5 milliLiter(s) IntraMuscular once  insulin regular Infusion 4 Unit(s)/Hr IV Continuous <Continuous>  ipratropium 17 MICROgram(s) HFA Inhaler 1 Puff(s) Inhalation every 6 hours  lactulose Syrup 10 Gram(s) Oral two times a day  levoFLOXacin IVPB      methylPREDNISolone sodium succinate Injectable 60 milliGRAM(s) IV Push two times a day  midazolam Infusion 0.02 mG/kG/Hr IV Continuous <Continuous>  montelukast 10 milliGRAM(s) Oral at bedtime  multivitamin/minerals 1 Tablet(s) Oral daily  norepinephrine Infusion 0.05 MICROgram(s)/kG/Min IV Continuous <Continuous>  oseltamivir 75 milliGRAM(s) Oral two times a day  pantoprazole    Tablet 40 milliGRAM(s) Oral before breakfast  propofol Infusion 10 MICROgram(s)/kG/Min IV Continuous <Continuous>  senna 2 Tablet(s) Oral at bedtime  tacrolimus 0.5 milliGRAM(s) Oral every 12 hours  tiotropium 18 MICROgram(s) Capsule 1 Capsule(s) Inhalation daily  vancomycin  IVPB 750 milliGRAM(s) IV Intermittent every 12 hours    PRN Inpatient Medications  oxyCODONE    IR 10 milliGRAM(s) Oral every 6 hours PRN        VITALS/PHYSICAL EXAM  --------------------------------------------------------------------------------  T(C): 37.1 (02-03-20 @ 00:00), Max: 37.1 (02-03-20 @ 00:00)  HR: 82 (02-03-20 @ 06:00) (76 - 92)  BP: 109/60 (02-03-20 @ 06:00) (70/48 - 112/61)  RR: 30 (02-03-20 @ 06:00) (27 - 32)  SpO2: 92% (02-03-20 @ 06:00) (92% - 100%)  Wt(kg): --        02-02-20 @ 07:01  -  02-03-20 @ 07:00  --------------------------------------------------------  IN: 2459 mL / OUT: 750 mL / NET: 1709 mL      Physical Exam:  	Gen: intubated/ventilated   	Pulm:  B/L ronchi at bases   	CV:S1S2; no rub  	Abd: +distended  	LE: no edema        LABS/STUDIES  --------------------------------------------------------------------------------              11.5   28.61 >-----------<  152      [02-03-20 @ 04:30]              37.4     141  |  106  |  46  ----------------------------<  89      [02-03-20 @ 04:30]  4.7   |  20  |  2.4        Ca     6.8     [02-03-20 @ 04:30]      Mg     2.3     [02-03-20 @ 04:30]      Phos  3.2     [02-03-20 @ 04:30]    TPro  5.4  /  Alb  2.9  /  TBili  0.3  /  DBili  x   /  AST  18  /  ALT  33  /  AlkPhos  252  [02-03-20 @ 04:30]    PT/INR: PT >40.00, INR 3.66       [02-02-20 @ 04:30]  PTT: 29.3       [02-02-20 @ 04:30]          [02-03-20 @ 04:30]    Creatinine Trend:  SCr 2.4 [02-03 @ 04:30]  SCr 2.1 [02-02 @ 17:11]  SCr 2.0 [02-02 @ 11:00]  SCr 2.0 [02-02 @ 07:50]  SCr 2.0 [02-02 @ 04:30]    Urinalysis - [01-31-20 @ 17:11]      Color Yellow / Appearance Slightly Turbid / SG 1.025 / pH 6.0      Gluc 100 mg/dL / Ketone Small  / Bili Negative / Urobili <2 mg/dL       Blood Small / Protein 100 mg/dL / Leuk Est Negative / Nitrite Negative      RBC 1 / WBC 9 / Hyaline 14 / Gran  / Sq Epi  / Non Sq Epi 8 / Bacteria Negative      HbA1c 6.9      [03-12-18 @ 04:54]  TSH 0.57      [02-01-20 @ 04:46]  Lipid: chol 203, , HDL 74,       [02-01-20 @ 04:46]      Rheumatoid Factor 59      [02-01-20 @ 04:46]

## 2020-02-03 NOTE — PROGRESS NOTE ADULT - ASSESSMENT
73 y/o F with REJI in hospital for SOB, hemoptysis, cough.  PMH asthma, COPD not on home O2, autoimmune hepatitis on prednisone and tacrolimus, heterozygous prothrombin gene mutation with hx of PE and DVT on coumadin, recent hospitalization for pneumonia   # REJI/ ATN in nature  # non oliguric  # creatinine trending up  # remains on pressors   # d/c lasix  # last vanco level, 34, decrease vanco to q 24  #ID notes appreciated   # check with GI if ok to hold FK in view of sepsis, last level ok  # ph at goal, corrected calcium around 7.8, start calcium carb 2 tablets po q 7  # no acute indication for RRT  # will follow

## 2020-02-04 NOTE — PROGRESS NOTE ADULT - SUBJECTIVE AND OBJECTIVE BOX
Nephrology progress note    THIS IS AN INCOMPLETE NOTE . FULL NOTE TO FOLLOW SHORTLY    Patient is seen and examined, events over the last 24 h noted .    Allergies:  No Known Allergies    Hospital Medications:   MEDICATIONS  (STANDING):  ALBUTerol    90 MICROgram(s) HFA Inhaler 1 Puff(s) Inhalation every 6 hours  calcium carbonate    500 mG (Tums) Chewable 2 Tablet(s) Chew every 8 hours  cefepime   IVPB 2000 milliGRAM(s) IV Intermittent <User Schedule>  chlorhexidine 0.12% Liquid 15 milliLiter(s) Oral Mucosa every 12 hours  chlorhexidine 4% Liquid 1 Application(s) Topical <User Schedule>  cyanocobalamin 1000 MICROGram(s) Oral daily  fentaNYL   Infusion. 0.5 MICROgram(s)/kG/Hr (3.77 mL/Hr) IV Continuous <Continuous>  gabapentin 300 milliGRAM(s) Oral at bedtime  influenza   Vaccine 0.5 milliLiter(s) IntraMuscular once  insulin regular Infusion 4 Unit(s)/Hr (4 mL/Hr) IV Continuous <Continuous>  ipratropium 17 MICROgram(s) HFA Inhaler 1 Puff(s) Inhalation every 6 hours  lactulose Syrup 10 Gram(s) Oral two times a day  levoFLOXacin IVPB 500 milliGRAM(s) IV Intermittent every 48 hours  methylPREDNISolone sodium succinate IVPB 250 milliGRAM(s) IV Intermittent every 6 hours  midazolam Infusion 0.02 mG/kG/Hr (1.508 mL/Hr) IV Continuous <Continuous>  montelukast 10 milliGRAM(s) Oral at bedtime  multivitamin/minerals 1 Tablet(s) Oral daily  norepinephrine Infusion 0.05 MICROgram(s)/kG/Min (3.534 mL/Hr) IV Continuous <Continuous>  oseltamivir 30 milliGRAM(s) Oral <User Schedule>  pantoprazole   Suspension 40 milliGRAM(s) Oral daily  senna 2 Tablet(s) Oral at bedtime  sodium chloride 0.9%. 1000 milliLiter(s) (50 mL/Hr) IV Continuous <Continuous>  tacrolimus 0.5 milliGRAM(s) Oral every 12 hours  vancomycin  IVPB 750 milliGRAM(s) IV Intermittent <User Schedule>        VITALS:  T(F): 96.9 (20 @ 08:00), Max: 98.9 (20 @ 04:00)  HR: 74 (20 @ 08:00)  BP: 109/64 (20 @ 08:00)  RR: 31 (20 @ 08:00)  SpO2: 100% (20 @ 08:00)  Wt(kg): --     @ 07:01  -   @ 07:00  --------------------------------------------------------  IN: 2459 mL / OUT: 750 mL / NET: 1709 mL     @ 07:01  -   07:00  --------------------------------------------------------  IN: 3041.2 mL / OUT: 362 mL / NET: 2679.2 mL     @ 07:01  -   @ 09:22  --------------------------------------------------------  IN: 176.6 mL / OUT: 20 mL / NET: 156.6 mL      Height (cm): 162.6 ( 12:15)  Weight (kg): 81.5 ( 12:15)  BMI (kg/m2): 30.8 ( 12:15)  BSA (m2): 1.87 ( 12:15)    PHYSICAL EXAM:  Constitutional: NAD  HEENT: anicteric sclera, oropharynx clear, MMM  Neck: No JVD  Respiratory: CTAB, no wheezes, rales or rhonchi  Cardiovascular: S1, S2, RRR  Gastrointestinal: BS+, soft, NT/ND  Extremities: No cyanosis or clubbing. No peripheral edema  :  No henley.   Skin: No rashes    LABS:      136  |  105  |  57<H>  ----------------------------<  178<H>  4.9   |  16<L>  |  3.1<H>    Ca    6.6<L>      2020 04:40  Phos  3.2     02-  Mg     2.3         TPro  5.3<L>  /  Alb  2.8<L>  /  TBili  0.4  /  DBili      /  AST  15  /  ALT  24  /  AlkPhos  203<H>                            10.4   19.54 )-----------( 102      ( 2020 04:40 )             33.2       Urine Studies:  Urinalysis Basic - ( 2020 17:11 )    Color: Yellow / Appearance: Slightly Turbid / S.025 / pH:   Gluc:  / Ketone: Small  / Bili: Negative / Urobili: <2 mg/dL   Blood:  / Protein: 100 mg/dL / Nitrite: Negative   Leuk Esterase: Negative / RBC: 1 /HPF / WBC 9 /HPF   Sq Epi:  / Non Sq Epi: 8 /HPF / Bacteria: Negative        RADIOLOGY & ADDITIONAL STUDIES: Nephrology progress note  Patient is seen and examined, events over the last 24 h noted .  transferred to ICU sp [facility]/ DA?  case discussed with family and MICU team     Allergies:  No Known Allergies    Hospital Medications:   MEDICATIONS  (STANDING):  ALBUTerol    90 MICROgram(s) HFA Inhaler 1 Puff(s) Inhalation every 6 hours  calcium carbonate    500 mG (Tums) Chewable 2 Tablet(s) Chew every 8 hours  cefepime   IVPB 2000 milliGRAM(s) IV Intermittent <User Schedule>  cyanocobalamin 1000 MICROGram(s) Oral daily  fentaNYL   Infusion. 0.5 MICROgram(s)/kG/Hr (3.77 mL/Hr) IV Continuous <Continuous>  gabapentin 300 milliGRAM(s) Oral at bedtime  influenza   Vaccine 0.5 milliLiter(s) IntraMuscular once  insulin regular Infusion 4 Unit(s)/Hr (4 mL/Hr) IV Continuous <Continuous>  ipratropium 17 MICROgram(s) HFA Inhaler 1 Puff(s) Inhalation every 6 hours  lactulose Syrup 10 Gram(s) Oral two times a day  levoFLOXacin IVPB 500 milliGRAM(s) IV Intermittent every 48 hours  methylPREDNISolone sodium succinate IVPB 250 milliGRAM(s) IV Intermittent every 6 hours  midazolam Infusion 0.02 mG/kG/Hr (1.508 mL/Hr) IV Continuous <Continuous>  montelukast 10 milliGRAM(s) Oral at bedtime  multivitamin/minerals 1 Tablet(s) Oral daily  norepinephrine Infusion 0.05 MICROgram(s)/kG/Min (3.534 mL/Hr) IV Continuous <Continuous>  oseltamivir 30 milliGRAM(s) Oral <User Schedule>  pantoprazole   Suspension 40 milliGRAM(s) Oral daily  senna 2 Tablet(s) Oral at bedtime  sodium chloride 0.9%. 1000 milliLiter(s) (50 mL/Hr) IV Continuous <Continuous>  tacrolimus 0.5 milliGRAM(s) Oral every 12 hours  vancomycin  IVPB 750 milliGRAM(s) IV Intermittent <User Schedule>        VITALS:  T(F): 96.9 (20 @ 08:00), Max: 98.9 (20 @ 04:00)  HR: 74 (20 @ 08:00)  BP: 109/64 (20 @ 08:00)  RR: 31 (20 @ 08:00)  SpO2: 100% (20 @ 08:00)  Wt(kg): --     @ 07:01  -   @ 07:00  --------------------------------------------------------  IN: 2459 mL / OUT: 750 mL / NET: 1709 mL     @ 07:  -   @ 07:00  --------------------------------------------------------  IN: 3041.2 mL / OUT: 362 mL / NET: 2679.2 mL     @ 07:01  -   @ 09:22  --------------------------------------------------------  IN: 176.6 mL / OUT: 20 mL / NET: 156.6 mL      Height (cm): 162.6 ( @ 12:15)  Weight (kg): 81.5 ( @ 12:15)  BMI (kg/m2): 30.8 ( @ 12:15)  BSA (m2): 1.87 ( @ 12:15)    PHYSICAL EXAM:  Constitutional: intubated on MV  HEENT: anicteric sclera, oropharynx clear, MMM  Neck: No JVD  Respiratory: CTAB, no wheezes, rales or rhonchi  Cardiovascular: S1, S2, RRR  Gastrointestinal: BS+, soft, NT/ND  Extremities: No cyanosis or clubbing. No peripheral edema  :  No henley.   Skin: No rashes    LABS:      136  |  105  |  57<H>  ----------------------------<  178<H>  4.9   |  16<L>  |  3.1<H>  Creatinine Trend: 3.1<--, 2.4<--, 2.1<--, 2.0<--, 2.0<--, 2.0<--  Ca    6.6<L>      2020 04:40  Phos  3.2     02-  Mg     2.3         TPro  5.3<L>  /  Alb  2.8<L>  /  TBili  0.4  /  DBili      /  AST  15  /  ALT  24  /  AlkPhos  203<H>                            10.4   19.54 )-----------( 102      ( 2020 04:40 )             33.2     Vancomycin Level, Trough: 34.9: Vancomycin trough levels should be rapidly reached and maintained at  15-20 ug/ml for life threatening MRSA  infections such as sepsis, endocarditis, osteomyelitis and pneumonia. A  first trough level should be drawn  before the 3rd or 4th dose.  Risk of renal toxicity is increased for levels >15 ug/ml, in patients on  other nephrotoxic drugs, who are  hemodynamically unstable, have unstable renal function, or are on  Vancomycin therapy for >14 days. Renal function with  creatinine levels should be monitored for those patients. ug/mL (20 @ 06:30)      Urine Studies:  Urinalysis Basic - ( 2020 17:11 )    Color: Yellow / Appearance: Slightly Turbid / S.025 / pH:   Gluc:  / Ketone: Small  / Bili: Negative / Urobili: <2 mg/dL   Blood:  / Protein: 100 mg/dL / Nitrite: Negative   Leuk Esterase: Negative / RBC: 1 /HPF / WBC 9 /HPF   Sq Epi:  / Non Sq Epi: 8 /HPF / Bacteria: Negative        RADIOLOGY & ADDITIONAL STUDIES:

## 2020-02-04 NOTE — PROGRESS NOTE ADULT - ASSESSMENT
75 y/o F with RJEI in hospital for SOB, hemoptysis, cough.  PMH asthma, COPD not on home O2, autoimmune hepatitis on prednisone and tacrolimus, heterozygous prothrombin gene mutation with hx of PE and DVT on coumadin, recent hospitalization for pneumonia   # REJI/ ATN in nature  # non oliguric/ sp fluid boluses will follow UO and BMP   # creatinine trending up/ VANCO LEVEL NOTED UP / will need repeat trough / dose was reduced follow with ID recs   # remains on pressors   #ID notes appreciated follow recs   # on FK continue for now check trough please   # ph at goal, corrected calcium around 7.8, on Ca carbonate now   # no acute indication for RRT/ will follow closely   # will follow

## 2020-02-04 NOTE — PROGRESS NOTE ADULT - SUBJECTIVE AND OBJECTIVE BOX
DONI PATRICK  74y, Female  Allergy: No Known Allergies      CHIEF COMPLAINT: SOB and desaturation (04 Feb 2020 10:00)      INTERVAL EVENTS/HPI  - T(F): , Max: 98.9 (02-04-20 @ 04:00)  - WBC Count: 19.54 (02-04-20 @ 04:40)  WBC Count: 28.61 (02-03-20 @ 04:30)  - Creatinine, Serum: 3.1 (02-04-20 @ 04:40) Vancomycin Level, Trough: 34.9: (02.03.20 @ 06:30)  Creatinine, Serum: 2.4 (02-03-20 @ 04:30)       ROS  unable to obtain history secondary to patient's mental status and/or sedation     VITALS:  T(F): 97, Max: 98.9 (02-04-20 @ 04:00)  HR: 74  BP: 101/63  RR: 26Vital Signs Last 24 Hrs  T(C): 36.1 (04 Feb 2020 12:00), Max: 37.2 (04 Feb 2020 04:00)  T(F): 97 (04 Feb 2020 12:00), Max: 98.9 (04 Feb 2020 04:00)  HR: 74 (04 Feb 2020 12:00) (72 - 92)  BP: 101/63 (04 Feb 2020 12:00) (101/56 - 135/71)  BP(mean): 74 (04 Feb 2020 12:00) (67 - 99)  RR: 26 (04 Feb 2020 12:00) (26 - 40)  SpO2: 99% (04 Feb 2020 12:00) (92% - 100%)    PHYSICAL EXAM:  Gen: intubated  HEENT: Normocephalic, atraumatic  Neck: supple, no lymphadenopathy  CV: Regular rate & regular rhythm  Lungs: decreased BS at bases, no fremitus  Abdomen: Soft, BS present  Ext: Warm, well perfused trace LE edema  Neuro: sedated  Skin: no rash, no erythema  Lines: no phlebitis    FH: Non-contributory  Social Hx: Non-contributory    TESTS & MEASUREMENTS:                        10.4   19.54 )-----------( 102      ( 04 Feb 2020 04:40 )             33.2     02-04    136  |  105  |  57<H>  ----------------------------<  178<H>  4.9   |  16<L>  |  3.1<H>    Ca    6.6<L>      04 Feb 2020 04:40  Phos  3.2     02-03  Mg     2.3     02-04    TPro  5.3<L>  /  Alb  2.8<L>  /  TBili  0.4  /  DBili  x   /  AST  15  /  ALT  24  /  AlkPhos  203<H>  02-04    eGFR if Non African American: 14 mL/min/1.73M2 (02-04-20 @ 04:40)  eGFR if African American: 16 mL/min/1.73M2 (02-04-20 @ 04:40)    LIVER FUNCTIONS - ( 04 Feb 2020 04:40 )  Alb: 2.8 g/dL / Pro: 5.3 g/dL / ALK PHOS: 203 U/L / ALT: 24 U/L / AST: 15 U/L / GGT: x               Culture - Fungal, Bronchial (collected 02-03-20 @ 15:00)  Source: .Bronchial None  Preliminary Report (02-04-20 @ 08:06):    Testing in progress    Culture - Bronchial (collected 02-03-20 @ 15:00)  Source: .Bronchial None  Gram Stain (02-04-20 @ 05:52):    No polymorphonuclear cells seen per low power field    No squamous epithelial cells per low power field    No organisms seen    Culture - Blood (collected 01-31-20 @ 09:50)  Source: .Blood Blood  Preliminary Report (02-01-20 @ 23:02):    No growth to date.    Culture - Blood (collected 01-31-20 @ 09:50)  Source: .Blood Blood  Preliminary Report (02-01-20 @ 23:02):    No growth to date.        Blood Gas Venous - Lactate: 1.8 mmoL/L (01-31-20 @ 12:52)  Lactate, Blood: 2.9 mmol/L (01-31-20 @ 09:25)      INFECTIOUS DISEASES TESTING  MRSA PCR Result.: Positive (02-01-20 @ 20:40)  Fungitell: 49 (02-01-20 @ 11:17)  Rapid RVP Result: Detected (01-31-20 @ 16:24)  Legionella Antigen, Urine: Negative (01-31-20 @ 15:36)  Streptococcus Pneumoniae Ag Urine: Negative (01-31-20 @ 15:36)  Legionella Antigen, Urine: Negative (01-20-20 @ 02:50)  MRSA PCR Result.: Positive (01-14-20 @ 13:59)  MRSA PCR Result.: Negative (08-13-19 @ 08:46)      RADIOLOGY & ADDITIONAL TESTS:  I have personally reviewed the last Chest xray  CXR      CT      CARDIOLOGY TESTING  12 Lead ECG:   Ventricular Rate 118 BPM    Atrial Rate 118 BPM    P-R Interval 150 ms    QRS Duration 80 ms    Q-T Interval 326 ms    QTC Calculation(Bezet) 456 ms    P Axis 41 degrees    R Axis -10 degrees    T Axis 11 degrees    Diagnosis Line Sinus tachycardia  Possible Left atrial enlargement  Inferior infarct , age undetermined  Abnormal ECG    Confirmed by MOHSEN SONG MD [id number]) on 1/31/2020 12:38:55 PM (01-31-20 @ 11:06)      MEDICATIONS  ALBUTerol    90 MICROgram(s) HFA Inhaler 1  calcium carbonate    500 mG (Tums) Chewable 2  cefepime   IVPB 2000  chlorhexidine 0.12% Liquid 15  chlorhexidine 4% Liquid 1  cyanocobalamin 1000  desmopressin IVPB 24  fentaNYL   Infusion. 0.5  gabapentin 300  influenza   Vaccine 0.5  insulin regular Infusion 4  ipratropium 17 MICROgram(s) HFA Inhaler 1  lactulose Syrup 10  levoFLOXacin IVPB 500  methylPREDNISolone sodium succinate IVPB 250  midazolam Infusion 0.02  montelukast 10  multivitamin/minerals 1  mupirocin 2% Nasal 1  norepinephrine Infusion 0.05  oseltamivir 30  pantoprazole   Suspension 40  senna 2  sodium bicarbonate 650  tacrolimus 0.5  vancomycin  IVPB 750      ANTIBIOTICS:  cefepime   IVPB 2000 milliGRAM(s) IV Intermittent <User Schedule>  levoFLOXacin IVPB 500 milliGRAM(s) IV Intermittent every 48 hours  oseltamivir 30 milliGRAM(s) Oral <User Schedule>  vancomycin  IVPB 750 milliGRAM(s) IV Intermittent <User Schedule>      All available historical records have been reviewed

## 2020-02-04 NOTE — PROGRESS NOTE ADULT - ASSESSMENT
A 74y female with PMH of asthma, COPD not on home O2, autoimmune hepatitis on prednisone and tacrolimus, heterozygous prothrombin gene mutation with hx of PE and DVT on coumadin presents to the hospital complaining of cough, hemoptysis, SOB and desaturation to 70s on nonrebreather. Currently, patient is admitted for acute hypoxic respiratory failure due to flu + and possible bacterial infection/pneumonia with r/o alveolar hemorrhage. The hospital course was complicated by REJI with Cr today of 3.1, up from 2.4 yesterday.     # Acute hypoxic resp failure due to flu +ve and possible bacterial infection and to r/u alveolar hemorrhage   - intubated and sedated  - bronch done 2/3 - awaiting results  - C/w Solu-medrol 60 mg iV q12  - C/w Vanc, cefepime and levaquin - renally dosed  - oseltamivir 75 mg BID for 10 days   - C/w Duoneb q6h and q4h PRN  - F/up autoimmune panel ( TEE, RF, GBM, ANCA, IgA endomysial ab, APS panel)    #REJI likely ATN  - holding Lasix for now  - Renal dosing for Vanc per nephro  - f/u with nephro    #Immunosuppressed: autoimmune hepatitis on prednisone and tacrolimus  - continue with prednisone and tacrolimus    # Autoimmune hepatitis  - On prednisone 60 mg PO qd at home  - c/w methylprednisolone 60 mg iv BID   - C/w tacrolimus 0.5 q12h    # Recent CMV infection in January 2020  - pending CMV PCR    # HTN  - now hypotension   - was on amlodipine 10 mg qd and lopressor 50 mg PO qd holding now due to hypotension  - on levophed 0.17 as of this morning  - Monitor BPs    # heterozygous prothrombin gene mutation with hx of PE and DVT on coumadin  - holding coumadin for now due to suspected alveolar hemorrhage   - INR 1.47 as of 2/4  - DDAVP to be given    #0.5 cm of GB polyp  -needs f/u US in 12 months     # Hx of asthma  - C/w montelukast qd    # GI PPx: Protonix 40 mg PO qd  # DVT PPx: sequentials  # Activity: bedrest  # Dispo: ICU for now  # Code Status: FULL A 74y female with PMH of asthma, COPD not on home O2, autoimmune hepatitis on prednisone and tacrolimus, heterozygous prothrombin gene mutation with hx of PE and DVT on coumadin presents to the hospital complaining of cough, hemoptysis, SOB and desaturation to 70s.    # Acute hypoxic resp failure due to flu +ve, possible bacterial infection and alveolar hemorrhage   - intubated and sedated, s/p Bronch showing some bleeding  - BAL pending results   - C/w Solu-medrol 250 mg iV q6  - DDAVP q8h for 3 doses   - C/w Vanc, cefepime and Levaquin - renally dosed  - oseltamivir 30 mg BID for 10 days   - C/w Duoneb   - F/up autoimmune panel ( TEE, RF, GBM, ANCA, IgA endomysial ab, APS panel)    #REJI now oliguric  likely ATN  - Cr trends up  - holding Lasix for now  - Renal dosing abs  - give bolus half liter, add Na Bicarb po  - f/u with nephro    #Immunosuppressed: autoimmune hepatitis on prednisone and tacrolimus  - continue with prednisone and tacrolimus    # Autoimmune hepatitis  - On prednisone 60 mg PO qd at home  - now on methylprednisolone 250mg iv Q6H  - C/w tacrolimus 0.5 q12h, level was ok, ok to keep it, for now    # Recent CMV infection in January 2020  - pending CMV PCR    # HTN  - now hypotension   - was on amlodipine 10 mg qd and lopressor 50 mg PO qd holding now due to hypotension  - on levophed 0.17 as of this morning  - Monitor BPs    # heterozygous prothrombin gene mutation with hx of PE and DVT on coumadin  - holding coumadin for now due to suspected alveolar hemorrhage   - INR 1.47 as of 2/4      #0.5 cm of GB polyp  -needs f/u US in 12 months     # Hx of asthma  - C/w montelukast qd    # GI PPx: Protonix 40 mg PO qd  # DVT PPx: sequentials  # Activity: bedrest  # Dispo: ICU for now  # Code Status: FULL A 74y female with PMH of asthma, COPD not on home O2, autoimmune hepatitis on prednisone and tacrolimus, heterozygous prothrombin gene mutation with hx of PE and DVT on coumadin presents to the hospital complaining of cough, hemoptysis, SOB and desaturation to 70s.    # Acute hypoxic resp failure due to flu +ve, possible bacterial infection and alveolar hemorrhage   - intubated and sedated, s/p Bronch showing some bleeding  - BAL pending results   - C/w Solu-medrol 250 mg iV q6  - DDAVP q8h for 3 doses   - C/w Vanc, cefepime and Levaquin - renally dosed, Vnac trough in AM  - oseltamivir 30 mg BID for 10 days   - C/w Duoneb   - F/up autoimmune panel ( TEE, RF, GBM, ANCA, IgA endomysial ab, APS panel)    #REJI now oliguric  likely ATN  - Cr trends up  - holding Lasix for now  - Renal dosing abs  - give bolus half liter, add Na Bicarb po  - f/u with nephro    #Immunosuppressed: autoimmune hepatitis on prednisone and tacrolimus  - continue with prednisone and tacrolimus    # Autoimmune hepatitis  - prednisone 60 mg PO qd at homenow on methylprednisolone 250mg iv Q6H  - C/w tacrolimus 0.5 q12h, level was ok, ok to keep it, for now    # Recent CMV infection in January 2020  - pending CMV PCR    # HTN  - now hypotension   - was on amlodipine 10 mg qd and lopressor 50 mg PO qd holding now due to hypotension  - on levophed 0.17 as of this morning  - Monitor BPs    # heterozygous prothrombin gene mutation with hx of PE and DVT on coumadin  - holding coumadin for now due to suspected alveolar hemorrhage   - INR 1.47 as of 2/4      #0.5 cm of GB polyp  -needs f/u US in 12 months     # Hx of asthma  - C/w montelukast qd    # GI PPx: Protonix 40 mg PO qd  # DVT PPx: sequentials  # Activity: bedrest  # Dispo: ICU for now  # Code Status: FULL

## 2020-02-04 NOTE — PROGRESS NOTE ADULT - SUBJECTIVE AND OBJECTIVE BOX
Patient is a 74y old  Female who presents with a chief complaint of SOB and desaturation (03 Feb 2020 14:16)        Over Night Events: Remains critically ill on MV. On Levophed 0.16.  Sedated.  SP bronchoscopy yesterday         ROS:     CONSTITUTIONAL:   no fever   no chills.  no weight gain   no weight loss    EYES:   no discharge,   no pain  no redness,   no visual changes.    ENT:   Ears: no ear pain and no hearing problems.  Nose: no nasal congestion and no nasal drainage.  Mouth/Throat: no dysphagia,  no hoarseness and no throat pain.  Neck: no lumps, no pain, no stiffness and no swollen glands.     CARDIOVASCULAR:   no chest pain,   no swelling  no palpitaions  no syncope    RESPIRATORY:  Per HPI     GASTROINTESTINAL:   no abdominal pain,   no constipation,   no diarrhea,   no vomiting.    GENITOURINARY:  no dysuria,   no frequency,   no urgency  no hematuria.    MUSCULOSKELETAL:   no back pain,   no musculoskeletal pain,  no weakness.    SKIN:   no jaundice,   no lesions,   no pruritis,   no rashes.    NEURO:   Sedated     PSYCHIATRIC:   no known mental health issues  no anxiety  no depression    ALLERGIC/IMMUNOLOGIC:   No active allergic or immunologic issues        PHYSICAL EXAM    ICU Vital Signs Last 24 Hrs  T(C): 36.1 (04 Feb 2020 08:00), Max: 37.2 (04 Feb 2020 04:00)  T(F): 96.9 (04 Feb 2020 08:00), Max: 98.9 (04 Feb 2020 04:00)  HR: 74 (04 Feb 2020 08:00) (74 - 92)  BP: 109/64 (04 Feb 2020 08:00) (91/67 - 137/70)  BP(mean): 83 (04 Feb 2020 08:00) (67 - 99)  ABP: --  ABP(mean): --  RR: 31 (04 Feb 2020 08:00) (27 - 81)  SpO2: 100% (04 Feb 2020 08:00) (92% - 100%)      CONSTITUTIONAL:   Ill appearing.  Well nourished.  NAD    ENT:   Airway patent,   Mouth with normal mucosa.   No thrush    CARDIAC:   Normal rate,   Regular rhythm.     No edema      RESPIRATORY:   NO wheezing  Bilateral BS  Normal chest expansion  Not tachypneic,  No use of accessory muscles    GASTROINTESTINAL:  Abdomen soft,   Non-tender,   No guarding,   + BS    MUSCULOSKELETAL:   Range of motion is not limited,  No clubbing, cyanosis    NEUROLOGICAL:   Sedated    SKIN:   Skin normal color for race,   warm, dry   No evidence of rash.        02-03-20 @ 07:01  -  02-04-20 @ 07:00  --------------------------------------------------------  IN:    fentaNYL Infusion.: 440.6 mL    insulin regular Infusion: 39 mL    IV PiggyBack: 550 mL    midazolam Infusion: 144 mL    norepinephrine Infusion: 467.6 mL    Sodium Chloride 0.9% IV Bolus: 1000 mL    sodium chloride 0.9%.: 400 mL  Total IN: 3041.2 mL    OUT:    Indwelling Catheter - Urethral: 175 mL    Ureteral Catheter: 187 mL  Total OUT: 362 mL    Total NET: 2679.2 mL      02-04-20 @ 07:01  -  02-04-20 @ 09:12  --------------------------------------------------------  IN:    fentaNYL Infusion.: 32.6 mL    insulin regular Infusion: 6 mL    midazolam Infusion: 12 mL    norepinephrine Infusion: 26 mL    sodium chloride 0.9%.: 100 mL  Total IN: 176.6 mL    OUT:    Indwelling Catheter - Urethral: 20 mL  Total OUT: 20 mL    Total NET: 156.6 mL          LABS:                            10.4   19.54 )-----------( 102      ( 04 Feb 2020 04:40 )             33.2                                               02-04    136  |  105  |  57<H>  ----------------------------<  178<H>  4.9   |  16<L>  |  3.1<H>    Ca    6.6<L>      04 Feb 2020 04:40  Phos  3.2     02-03  Mg     2.3     02-04    TPro  5.3<L>  /  Alb  2.8<L>  /  TBili  0.4  /  DBili  x   /  AST  15  /  ALT  24  /  AlkPhos  203<H>  02-04      PT/INR - ( 04 Feb 2020 04:40 )   PT: 16.90 sec;   INR: 1.47 ratio         PTT - ( 04 Feb 2020 04:40 )  PTT:23.4 sec                                                                                     LIVER FUNCTIONS - ( 04 Feb 2020 04:40 )  Alb: 2.8 g/dL / Pro: 5.3 g/dL / ALK PHOS: 203 U/L / ALT: 24 U/L / AST: 15 U/L / GGT: x                                                  Culture - Fungal, Bronchial (collected 03 Feb 2020 15:00)  Source: .Bronchial None  Preliminary Report (04 Feb 2020 08:06):    Testing in progress    Culture - Bronchial (collected 03 Feb 2020 15:00)  Source: .Bronchial None  Gram Stain (04 Feb 2020 05:52):    No polymorphonuclear cells seen per low power field    No squamous epithelial cells per low power field    No organisms seen                                                   Mode: AC/ CMV (Assist Control/ Continuous Mandatory Ventilation)  RR (machine): 30  TV (machine): 380  FiO2: 60  PEEP: 12.5  ITime: 1  MAP: 21  PC: 0  PIP: 34                                      ABG - ( 04 Feb 2020 03:35 )  pH, Arterial: 7.21  pH, Blood: x     /  pCO2: 44    /  pO2: 95    / HCO3: 18    / Base Excess: -9.8  /  SaO2: 97                  MEDICATIONS  (STANDING):  ALBUTerol    90 MICROgram(s) HFA Inhaler 1 Puff(s) Inhalation every 6 hours  calcium carbonate    500 mG (Tums) Chewable 2 Tablet(s) Chew every 8 hours  cefepime   IVPB 2000 milliGRAM(s) IV Intermittent <User Schedule>  chlorhexidine 0.12% Liquid 15 milliLiter(s) Oral Mucosa every 12 hours  chlorhexidine 4% Liquid 1 Application(s) Topical <User Schedule>  cyanocobalamin 1000 MICROGram(s) Oral daily  fentaNYL   Infusion. 0.5 MICROgram(s)/kG/Hr (3.77 mL/Hr) IV Continuous <Continuous>  gabapentin 300 milliGRAM(s) Oral at bedtime  influenza   Vaccine 0.5 milliLiter(s) IntraMuscular once  insulin regular Infusion 4 Unit(s)/Hr (4 mL/Hr) IV Continuous <Continuous>  ipratropium 17 MICROgram(s) HFA Inhaler 1 Puff(s) Inhalation every 6 hours  lactulose Syrup 10 Gram(s) Oral two times a day  levoFLOXacin IVPB 500 milliGRAM(s) IV Intermittent every 48 hours  methylPREDNISolone sodium succinate IVPB 250 milliGRAM(s) IV Intermittent every 6 hours  midazolam Infusion 0.02 mG/kG/Hr (1.508 mL/Hr) IV Continuous <Continuous>  montelukast 10 milliGRAM(s) Oral at bedtime  multivitamin/minerals 1 Tablet(s) Oral daily  norepinephrine Infusion 0.05 MICROgram(s)/kG/Min (3.534 mL/Hr) IV Continuous <Continuous>  oseltamivir 30 milliGRAM(s) Oral <User Schedule>  pantoprazole   Suspension 40 milliGRAM(s) Oral daily  senna 2 Tablet(s) Oral at bedtime  sodium chloride 0.9%. 1000 milliLiter(s) (50 mL/Hr) IV Continuous <Continuous>  tacrolimus 0.5 milliGRAM(s) Oral every 12 hours  vancomycin  IVPB 750 milliGRAM(s) IV Intermittent <User Schedule>    MEDICATIONS  (PRN):  oxyCODONE    IR 10 milliGRAM(s) Oral every 6 hours PRN Severe Pain (7 - 10)      CXR interpreted by me:

## 2020-02-04 NOTE — PROGRESS NOTE ADULT - ASSESSMENT
ASSESSMENT  75y/o F w/ hx of asthma, COPD not on home O2, autoimmune hepatitis on prednisone and tacrolimus, heterozygous prothrombin gene mutation with hx of PE and DVT on coumadin with recent hospitalization in January 2020 with a diagnosis of pneumonia presents for worsening SOB, hemoptysis and cough.    IMPRESSION  #Flu + AH1, ?superimposed atypical PNA (imaging not really consistent with bacterial PNA). Recent bronch 1/20 negative for PCP, Fungal, etc, previous bronch cx with yeast (likely oral contaminant) since GMS neg    2/3 Bronch   No organisms seen    Fungitell: 49 (02-01-20 @ 11:17)     Lactate Dehydrogenase, Serum: 607 (02.03.20 @ 04:30)    Procalcitonin, Serum: 1.86:    CTA chest showing no PE but showing interval development of multifocal bilateral groundglass opacities as well as new moderate to severe bronchiectasis in the right lung,   1/13 CT b/l GGOs, compatible with intersitial edema      Serum Pro-Brain Natriuretic Peptide: 722 pg/mL (01.31.20 @ 09:25)<-- Serum Pro-Brain Natriuretic Peptide: 345 pg/mL (01.13.20 @ 12:10)    During last hospitalization: bronch cx bacterial NG, +yeast, pending ID, neg legionella, + MRSA PCR    MRSA PCR Result.: Positive (02-01-20 @ 20:40)  #Severe sepsis on admission P>90, WBC 19, RR>20, lactic acidosis Lactate, Blood: 2.9 mmol/L (01-31-20 @ 09:25)    afebrile   #Elevated Alk ph, transaminitis  #LLE wound, not infection     12/7 WCX MSSA, Kleb, bacteroides    8/2019 MRSA in a wound  #Immunosuppressed: autoimmune hepatitis on prednisone and tacrolimus    RECOMMENDATIONS  - D/C VANC REJI and elevated level and No MRSA in cultures  - oseltamivir 30 milliGRAM(s) daily 2/2-,  would treat 7-10 days given immunocompromised   - send serum cryptococcal serum Ag, CMV serum PCR (+IgM and IgG last admission)  - bronch: f/u cx, fungal, AFB, PCP stain, viral cx, Toxo PCR  - levaquin 500mg q48h IV for atypical coverage  - cefepime   IVPB 2000 milliGRAM(s) IV Intermittent every 24 hours  - methylPREDNISolone sodium succinate IVPB 250    Spectra [phone]

## 2020-02-04 NOTE — PROGRESS NOTE ADULT - SUBJECTIVE AND OBJECTIVE BOX
DONI ELDER 74y Female  MRN#: [id number]     SUBJECTIVE  Patient is a 74y old Female who presents with a chief complaint of SOB and desaturation (04 Feb 2020 09:21)    HPI on Admission: 3y/o F w/ hx of asthma, COPD not on home O2, autoimmune hepatitis on prednisone and tacrolimus, heterozygous prothrombin gene mutation with hx of PE and DVT on coumadin with recent hospitalization in January 2020 with a diagnosis of pneumonia presents for worsening SOB, hemoptysis and cough. Everything started yesterday night when patient was in bed, started to feel shortness of breath and had many episodes of hemoptysis with clots, she also had substernal chest pain non radiating, moderate in intensity that worsens on coughing. She denies fever but endorses chills. She also admits for lightheadedness that occurred yesterday, no HA, abd pain, dysuria or paresthesias. She had 2 loose bowel movements since yesterday with some blood in the stools that she attributes to her hemorrhoids. She stopped Coumadin couple of days ago since her INR was supratherapeutic. She is from [facility] term and also mentions that her  and another family member have the flu and she was exposed to them. She did not have the flu shot this season.  In ED, temp was 100.1 rectally, tachycardic (sinus), she was saturating to 70s on non rebreather and her SaO2 went up to 95%. ABG showing respiratory alkalosis initially and then corrected after BIPAP placement and correction of RR.  CTA chest showing no PE but showing interval development of multifocal bilateral ground glass opacities as well as new moderate to severe bronchiectasis in the right lung. Findings are concerning for an acute infectious/inflammatory process.     Interval History: Today is hospital day 4d, and this morning she is lying in bed, still intubated and sedated. On levophed 0.17 as of rounds this morning. No acute overnight events.     OBJECTIVE  PAST MEDICAL & SURGICAL HISTORY  Prothrombin gene mutation  Autoimmune hepatitis  Other chronic pulmonary embolism without acute cor pulmonale  Essential hypertension  Autoimmune hepatitis treated with steroids  Asthma  DVT (deep venous thrombosis)  S/P debridement  History of back surgery    ALLERGIES:  No Known Allergies    MEDICATIONS:  STANDING MEDICATIONS  ALBUTerol    90 MICROgram(s) HFA Inhaler 1 Puff(s) Inhalation every 6 hours  calcium carbonate    500 mG (Tums) Chewable 2 Tablet(s) Chew every 8 hours  cefepime   IVPB 2000 milliGRAM(s) IV Intermittent <User Schedule>  chlorhexidine 0.12% Liquid 15 milliLiter(s) Oral Mucosa every 12 hours  chlorhexidine 4% Liquid 1 Application(s) Topical <User Schedule>  cyanocobalamin 1000 MICROGram(s) Oral daily  desmopressin IVPB 24 MICROGram(s) IV Intermittent every 12 hours  fentaNYL   Infusion. 0.5 MICROgram(s)/kG/Hr IV Continuous <Continuous>  gabapentin 300 milliGRAM(s) Oral at bedtime  influenza   Vaccine 0.5 milliLiter(s) IntraMuscular once  insulin regular Infusion 4 Unit(s)/Hr IV Continuous <Continuous>  ipratropium 17 MICROgram(s) HFA Inhaler 1 Puff(s) Inhalation every 6 hours  lactated ringers Bolus 500 milliLiter(s) IV Bolus once  lactulose Syrup 10 Gram(s) Oral two times a day  levoFLOXacin IVPB 500 milliGRAM(s) IV Intermittent every 48 hours  methylPREDNISolone sodium succinate IVPB 250 milliGRAM(s) IV Intermittent every 6 hours  midazolam Infusion 0.02 mG/kG/Hr IV Continuous <Continuous>  montelukast 10 milliGRAM(s) Oral at bedtime  multivitamin/minerals 1 Tablet(s) Oral daily  norepinephrine Infusion 0.05 MICROgram(s)/kG/Min IV Continuous <Continuous>  oseltamivir 30 milliGRAM(s) Oral <User Schedule>  pantoprazole   Suspension 40 milliGRAM(s) Oral daily  senna 2 Tablet(s) Oral at bedtime  sodium bicarbonate 650 milliGRAM(s) Oral three times a day  sodium chloride 0.9%. 1000 milliLiter(s) IV Continuous <Continuous>  tacrolimus 0.5 milliGRAM(s) Oral every 12 hours  vancomycin  IVPB 750 milliGRAM(s) IV Intermittent <User Schedule>    PRN MEDICATIONS  oxyCODONE    IR 10 milliGRAM(s) Oral every 6 hours PRN    HOME MEDICATIONS  Home Medications:  acetaminophen 500 mg oral tablet: 2 tab(s) orally every 8 hours (20 Jan 2020 13:38)  amLODIPine 10 mg oral tablet: 1 tab(s) orally once a day (at bedtime) (20 Jan 2020 13:38)  budesonide 3 mg oral capsule, extended release: 1 cap(s) orally 2 times a day (05 Dec 2019 17:19)  ferrous sulfate 325 mg (65 mg elemental iron) oral delayed release tablet: 1 tab(s) orally once a day (05 Dec 2019 17:19)  furosemide 20 mg oral tablet: 1 tab(s) orally once a day (05 Dec 2019 17:19)  gabapentin 300 mg oral capsule: 1 cap(s) orally once a day (at bedtime) (20 Jan 2020 13:38)  ibuprofen 400 mg oral tablet: 1 tab(s) orally every 6 hours, As needed, Moderate Pain (4 - 6) (20 Jan 2020 13:38)  ipratropium-albuterol 0.5 mg-2.5 mg/3 mLinhalation solution: 3 milliliter(s) inhaled every 6 hours, As Needed (20 Jan 2020 13:40)  lidocaine 5% topical film: Apply topically to affected area once a day (20 Jan 2020 13:38)  Metoprolol Tartrate 50 mg oral tablet: 1 tab(s) orally once a day (05 Dec 2019 17:19)  montelukast 10 mg oral tablet: 1 tab(s) orally once a day (at bedtime) (05 Dec 2019 17:19)  Multiple Vitamins with Minerals oral tablet: 1 tab(s) orally once a day (20 Jan 2020 13:38)  oxyCODONE 10 mg oral tablet: 1 tab(s) orally every 6 hours, As needed, Severe Pain (7 - 10) (20 Jan 2020 13:38)  predniSONE 20 mg oral tablet: 3 tab(s) orally once a day (20 Jan 2020 13:38)  ProAir HFA 90 mcg/inh inhalation aerosol: 1 puff(s) inhaled every 4 hours, As Needed (20 Jan 2020 13:40)  risedronate 150 mg oral tablet: 1 tab(s) orally once a month (05 Dec 2019 17:19)  tacrolimus 0.5 mg oral capsule: 1 cap(s) orally every 12 hours (05 Dec 2019 17:19)  tiotropium 18 mcg inhalation capsule: 1 cap(s) inhaled once a day, As Needed (20 Jan 2020 13:40)  Vitamin B12 1000 mcg oral tablet: 1 tab(s) orally once a day (05 Dec 2019 17:19)  Vitamin C 1000 mg oral tablet: 1 tab(s) orally once a day (05 Dec 2019 17:19)  warfarin 4 mg oral tablet: 1 tab(s) orally once a day (at bedtime) (20 Jan 2020 13:38)    VITAL SIGNS: Last 24 Hours  T(C): 36.1 (04 Feb 2020 08:00), Max: 37.2 (04 Feb 2020 04:00)  T(F): 96.9 (04 Feb 2020 08:00), Max: 98.9 (04 Feb 2020 04:00)  HR: 74 (04 Feb 2020 08:00) (74 - 92)  BP: 109/64 (04 Feb 2020 08:00) (91/67 - 137/70)  BP(mean): 83 (04 Feb 2020 08:00) (67 - 99)  RR: 31 (04 Feb 2020 08:00) (27 - 81)  SpO2: 100% (04 Feb 2020 08:00) (92% - 100%)    02-03-20 @ 07:01  -  02-04-20 @ 07:00  --------------------------------------------------------  IN: 3041.2 mL / OUT: 362 mL / NET: 2679.2 mL    02-04-20 @ 07:01  -  02-04-20 @ 10:01  --------------------------------------------------------  IN: 176.6 mL / OUT: 20 mL / NET: 156.6 mL    LABS:                        10.4   19.54 )-----------( 102      ( 04 Feb 2020 04:40 )             33.2     02-04    136  |  105  |  57<H>  ----------------------------<  178<H>  4.9   |  16<L>  |  3.1<H>    Ca    6.6<L>      04 Feb 2020 04:40  Phos  3.2     02-03  Mg     2.3     02-04    TPro  5.3<L>  /  Alb  2.8<L>  /  TBili  0.4  /  DBili  x   /  AST  15  /  ALT  24  /  AlkPhos  203<H>  02-04    LIVER FUNCTIONS - ( 04 Feb 2020 04:40 )  Alb: 2.8 g/dL / Pro: 5.3 g/dL / ALK PHOS: 203 U/L / ALT: 24 U/L / AST: 15 U/L / GGT: x           PT/INR - ( 04 Feb 2020 04:40 )   PT: 16.90 sec;   INR: 1.47 ratio      PTT - ( 04 Feb 2020 04:40 )  PTT:23.4 sec    ABG - ( 04 Feb 2020 03:35 )  pH, Arterial: 7.21  pH, Blood: x     /  pCO2: 44    /  pO2: 95    / HCO3: 18    / Base Excess: -9.8  /  SaO2: 97        Culture - Fungal, Bronchial (collected 03 Feb 2020 15:00)  Source: .Bronchial None  Preliminary Report (04 Feb 2020 08:06):    Testing in progress    Culture - Bronchial (collected 03 Feb 2020 15:00)  Source: .Bronchial None  Gram Stain (04 Feb 2020 05:52):    No polymorphonuclear cells seen per low power field    No squamous epithelial cells per low power field    No organisms seen    CAPILLARY BLOOD GLUCOSE    POCT Blood Glucose.: 168 mg/dL (04 Feb 2020 04:34)    RADIOLOGY:  < from: CT Angio Chest w/ IV Cont (01.31.20 @ 10:59) >  IMPRESSION:     No CT evidence of central pulmonary embolus.    Interval development of multifocal bilateral groundglass opacities as well as new moderate to severe bronchiectasis in the right lung. Findings are concerning for an acute infectious/inflammatory process.     < end of copied text >    < from: Xray Chest 1 View- PORTABLE-Routine (02.03.20 @ 05:42) >  Impression:      Calcified fibrothorax. Support devices as described. Bilateral opacifications without change.    < end of copied text >    PHYSICAL EXAM:  GENERAL: NAD, 74y F, intubated and sedated, paralyzed  HEAD:  Atraumatic, Normocephalic  EYES: EOMI, conjunctiva clear and sclera white  NECK: Supple, No JVD  CHEST/LUNG: Clear to auscultation bilaterally; No wheeze; No crackles; No accessory muscles used  HEART: Regular rate and rhythm; No murmurs;   ABDOMEN: Soft, Nontender, Nondistended; Bowel sounds present; No guarding  EXTREMITIES:  2+ Peripheral Pulses, No cyanosis or edema  NEUROLOGY: sedated    ADMISSION SUMMARY  Patient is a 74y old Female who presents with a chief complaint of SOB and desaturation (04 Feb 2020 09:21). DONI ELDER 74y Female  MRN#: [id number]     SUBJECTIVE  Patient is a 74y old Female who presents with a chief complaint of SOB and desaturation (04 Feb 2020 09:21)      Interval History: Today is hospital day 4d, and this morning she is lying in bed, still intubated and sedated. On levophed 0.17 as of rounds this morning. d/p bronch yesterday showed some bleeding,  No acute overnight events.     OBJECTIVE  PAST MEDICAL & SURGICAL HISTORY  Prothrombin gene mutation  Autoimmune hepatitis  Other chronic pulmonary embolism without acute cor pulmonale  Essential hypertension  Autoimmune hepatitis treated with steroids  Asthma  DVT (deep venous thrombosis)  S/P debridement  History of back surgery    ALLERGIES:  No Known Allergies    MEDICATIONS:  STANDING MEDICATIONS  ALBUTerol    90 MICROgram(s) HFA Inhaler 1 Puff(s) Inhalation every 6 hours  calcium carbonate    500 mG (Tums) Chewable 2 Tablet(s) Chew every 8 hours  cefepime   IVPB 2000 milliGRAM(s) IV Intermittent <User Schedule>  chlorhexidine 0.12% Liquid 15 milliLiter(s) Oral Mucosa every 12 hours  chlorhexidine 4% Liquid 1 Application(s) Topical <User Schedule>  cyanocobalamin 1000 MICROGram(s) Oral daily  desmopressin IVPB 24 MICROGram(s) IV Intermittent every 12 hours  fentaNYL   Infusion. 0.5 MICROgram(s)/kG/Hr IV Continuous <Continuous>  gabapentin 300 milliGRAM(s) Oral at bedtime  influenza   Vaccine 0.5 milliLiter(s) IntraMuscular once  insulin regular Infusion 4 Unit(s)/Hr IV Continuous <Continuous>  ipratropium 17 MICROgram(s) HFA Inhaler 1 Puff(s) Inhalation every 6 hours  lactated ringers Bolus 500 milliLiter(s) IV Bolus once  lactulose Syrup 10 Gram(s) Oral two times a day  levoFLOXacin IVPB 500 milliGRAM(s) IV Intermittent every 48 hours  methylPREDNISolone sodium succinate IVPB 250 milliGRAM(s) IV Intermittent every 6 hours  midazolam Infusion 0.02 mG/kG/Hr IV Continuous <Continuous>  montelukast 10 milliGRAM(s) Oral at bedtime  multivitamin/minerals 1 Tablet(s) Oral daily  norepinephrine Infusion 0.05 MICROgram(s)/kG/Min IV Continuous <Continuous>  oseltamivir 30 milliGRAM(s) Oral <User Schedule>  pantoprazole   Suspension 40 milliGRAM(s) Oral daily  senna 2 Tablet(s) Oral at bedtime  sodium bicarbonate 650 milliGRAM(s) Oral three times a day  sodium chloride 0.9%. 1000 milliLiter(s) IV Continuous <Continuous>  tacrolimus 0.5 milliGRAM(s) Oral every 12 hours  vancomycin  IVPB 750 milliGRAM(s) IV Intermittent <User Schedule>    PRN MEDICATIONS  oxyCODONE    IR 10 milliGRAM(s) Oral every 6 hours PRN    HOME MEDICATIONS  Home Medications:  acetaminophen 500 mg oral tablet: 2 tab(s) orally every 8 hours (20 Jan 2020 13:38)  amLODIPine 10 mg oral tablet: 1 tab(s) orally once a day (at bedtime) (20 Jan 2020 13:38)  budesonide 3 mg oral capsule, extended release: 1 cap(s) orally 2 times a day (05 Dec 2019 17:19)  ferrous sulfate 325 mg (65 mg elemental iron) oral delayed release tablet: 1 tab(s) orally once a day (05 Dec 2019 17:19)  furosemide 20 mg oral tablet: 1 tab(s) orally once a day (05 Dec 2019 17:19)  gabapentin 300 mg oral capsule: 1 cap(s) orally once a day (at bedtime) (20 Jan 2020 13:38)  ibuprofen 400 mg oral tablet: 1 tab(s) orally every 6 hours, As needed, Moderate Pain (4 - 6) (20 Jan 2020 13:38)  ipratropium-albuterol 0.5 mg-2.5 mg/3 mLinhalation solution: 3 milliliter(s) inhaled every 6 hours, As Needed (20 Jan 2020 13:40)  lidocaine 5% topical film: Apply topically to affected area once a day (20 Jan 2020 13:38)  Metoprolol Tartrate 50 mg oral tablet: 1 tab(s) orally once a day (05 Dec 2019 17:19)  montelukast 10 mg oral tablet: 1 tab(s) orally once a day (at bedtime) (05 Dec 2019 17:19)  Multiple Vitamins with Minerals oral tablet: 1 tab(s) orally once a day (20 Jan 2020 13:38)  oxyCODONE 10 mg oral tablet: 1 tab(s) orally every 6 hours, As needed, Severe Pain (7 - 10) (20 Jan 2020 13:38)  predniSONE 20 mg oral tablet: 3 tab(s) orally once a day (20 Jan 2020 13:38)  ProAir HFA 90 mcg/inh inhalation aerosol: 1 puff(s) inhaled every 4 hours, As Needed (20 Jan 2020 13:40)  risedronate 150 mg oral tablet: 1 tab(s) orally once a month (05 Dec 2019 17:19)  tacrolimus 0.5 mg oral capsule: 1 cap(s) orally every 12 hours (05 Dec 2019 17:19)  tiotropium 18 mcg inhalation capsule: 1 cap(s) inhaled once a day, As Needed (20 Jan 2020 13:40)  Vitamin B12 1000 mcg oral tablet: 1 tab(s) orally once a day (05 Dec 2019 17:19)  Vitamin C 1000 mg oral tablet: 1 tab(s) orally once a day (05 Dec 2019 17:19)  warfarin 4 mg oral tablet: 1 tab(s) orally once a day (at bedtime) (20 Jan 2020 13:38)    VITAL SIGNS: Last 24 Hours  T(C): 36.1 (04 Feb 2020 08:00), Max: 37.2 (04 Feb 2020 04:00)  T(F): 96.9 (04 Feb 2020 08:00), Max: 98.9 (04 Feb 2020 04:00)  HR: 74 (04 Feb 2020 08:00) (74 - 92)  BP: 109/64 (04 Feb 2020 08:00) (91/67 - 137/70)  BP(mean): 83 (04 Feb 2020 08:00) (67 - 99)  RR: 31 (04 Feb 2020 08:00) (27 - 81)  SpO2: 100% (04 Feb 2020 08:00) (92% - 100%)    02-03-20 @ 07:01  -  02-04-20 @ 07:00  --------------------------------------------------------  IN: 3041.2 mL / OUT: 362 mL / NET: 2679.2 mL    02-04-20 @ 07:01  -  02-04-20 @ 10:01  --------------------------------------------------------  IN: 176.6 mL / OUT: 20 mL / NET: 156.6 mL    LABS:                        10.4   19.54 )-----------( 102      ( 04 Feb 2020 04:40 )             33.2     02-04    136  |  105  |  57<H>  ----------------------------<  178<H>  4.9   |  16<L>  |  3.1<H>    Ca    6.6<L>      04 Feb 2020 04:40  Phos  3.2     02-03  Mg     2.3     02-04    TPro  5.3<L>  /  Alb  2.8<L>  /  TBili  0.4  /  DBili  x   /  AST  15  /  ALT  24  /  AlkPhos  203<H>  02-04    LIVER FUNCTIONS - ( 04 Feb 2020 04:40 )  Alb: 2.8 g/dL / Pro: 5.3 g/dL / ALK PHOS: 203 U/L / ALT: 24 U/L / AST: 15 U/L / GGT: x           PT/INR - ( 04 Feb 2020 04:40 )   PT: 16.90 sec;   INR: 1.47 ratio      PTT - ( 04 Feb 2020 04:40 )  PTT:23.4 sec    ABG - ( 04 Feb 2020 03:35 )  pH, Arterial: 7.21  pH, Blood: x     /  pCO2: 44    /  pO2: 95    / HCO3: 18    / Base Excess: -9.8  /  SaO2: 97        Culture - Fungal, Bronchial (collected 03 Feb 2020 15:00)  Source: .Bronchial None  Preliminary Report (04 Feb 2020 08:06):    Testing in progress    Culture - Bronchial (collected 03 Feb 2020 15:00)  Source: .Bronchial None  Gram Stain (04 Feb 2020 05:52):    No polymorphonuclear cells seen per low power field    No squamous epithelial cells per low power field    No organisms seen    CAPILLARY BLOOD GLUCOSE    POCT Blood Glucose.: 168 mg/dL (04 Feb 2020 04:34)    RADIOLOGY:  < from: CT Angio Chest w/ IV Cont (01.31.20 @ 10:59) >  IMPRESSION:     No CT evidence of central pulmonary embolus.    Interval development of multifocal bilateral groundglass opacities as well as new moderate to severe bronchiectasis in the right lung. Findings are concerning for an acute infectious/inflammatory process.     < end of copied text >    < from: Xray Chest 1 View- PORTABLE-Routine (02.03.20 @ 05:42) >  Impression:      Calcified fibrothorax. Support devices as described. Bilateral opacifications without change.    < end of copied text >  Culture - Fungal, Bronchial (02.03.20 @ 15:00)    Specimen Source: .Bronchial None    Culture Results:   Testing in progress    Culture - Bronchial (02.03.20 @ 15:00)    Gram Stain:   No polymorphonuclear cells seen per low power field  No squamous epithelial cells per low power field  No organisms seen    Specimen Source: .Bronchial None    Culture - Bronchial (01.16.20 @ 07:45)    Gram Stain:   Rare Squamous epithelial cells per low power field  Rare polymorphonuclear leukocytes per low power field  No organisms seen per oil power field    Specimen Source: .Bronchial None    Culture Results:   Normal Respiratory Nikki present        PHYSICAL EXAM:  GENERAL: NAD, 74y F, intubated and sedated,   HEAD:  Atraumatic, Normocephalic  EYES: EOMI, conjunctiva clear and sclera white  NECK: Supple, No JVD  CHEST/LUNG: Clear to auscultation bilaterally; No wheeze; No crackles; No accessory muscles used  HEART: Regular rate and rhythm; No murmurs;   ABDOMEN: Soft, Nontender, Nondistended; Bowel sounds present; No guarding  EXTREMITIES:  2+ Peripheral Pulses, No cyanosis or edema  NEUROLOGY: sedated    ADMISSION SUMMARY  Patient is a 74y old Female who presents with a chief complaint of SOB and desaturation (04 Feb 2020 09:21).

## 2020-02-04 NOTE — PROGRESS NOTE ADULT - ASSESSMENT
IMPRESSION:    Acute hypoxic respiratory failure  DAH  Influenza A  ARDS  REJI non oliguric   HO Autoimmune hepatitis on tacrolimus and chronic steroids   h/o hypercoagulable state/ vte was on coumadin     PLAN:    CNS:  SAT adn decrease Sedation as tolerated     HEENT: ET care.  Oral care     PULMONARY:  HOB @ 45 degrees. Solumedrol 250mg q6h for now.  Increase PEEP to 12.5.  FU PPl. and Driving Pressure.  RR 16    CARDIOVASCULAR: I=O.  Wean Levophed .  Fluid bolus      GI: GI prophylaxis. colace , senna.  OG feeding     RENAL:  Follow up lytes. Replete as needed. Keep Hogan.  FU with Renal     INFECTIOUS DISEASE: Follow up cultures.  FIU BAL>  Adjust ABX to GFR .  Repeat Vanc levels.  Deescalate ABX after BAL results     HEMATOLOGICAL:  DVT prophylaxis seq. f/u INR ,.  FU CBC and Coags. DDAVP     ENDOCRINE:  Follow up FS.  Insulin protocol if needed.    MUSCULOSKELETAL: Bed rest     dru     Code status: full   Prognosis: Poor prognosis   Continue MICU monitoring for now.

## 2020-02-05 NOTE — PROGRESS NOTE ADULT - ASSESSMENT
IMPRESSION:    Acute hypoxic respiratory failure  DAH  Influenza A treated   ARDS  REJI oliguric   HO Autoimmune hepatitis on tacrolimus and chronic steroids   h/o hypercoagulable state/ vte was on coumadin     PLAN:    CNS:  SAT and decrease Sedation as tolerated     HEENT: ET care.  Oral care     PULMONARY:  HOB @ 45 degrees. Solumedrol 80 mg q6h for now. Wean O2  FU PPl and Driving Pressure.  RR 16    CARDIOVASCULAR: I=O.  Wean Levophed .  [id number] 500 over 1 hour      GI: GI prophylaxis.  DC motility agts.  Hold feeding      RENAL:  Follow up lytes. Replete as needed. Keep Hogan.  FU with Renal     INFECTIOUS DISEASE: Follow up cultures.  Finish ABX course DC Vanc     HEMATOLOGICAL:  DVT prophylaxis seq. f/u INR ,.  FU CBC and Coags.     ENDOCRINE:  Follow up FS.  Insulin protocol if needed.    MUSCULOSKELETAL: Bed rest     Code status: full     Prognosis: Poor prognosis     Continue MICU monitoring for now.

## 2020-02-05 NOTE — PROGRESS NOTE ADULT - SUBJECTIVE AND OBJECTIVE BOX
Chart reviewed, patient examined. Pertinent results reviewed.  Case discussed with HO; specialist f/u reviewed  HD#5; Patient remains intubated and on pressors  Patient well-known to me from multiple prior admissions    SUBJECTIVE  Patient is a 74y old Female who presents with a chief complaint of SOB and desaturation (05 Feb 2020 09:09)    Interval History: Today is hospital day 5. The patient is still sedated and intubated, on levophed 0.2 as of this morning. Yesterday the patient was weaned off pressors but required to return on levophed overnight. She is still oliguric. events overnight: no fever, noted to have diarrhea, on lactulose and senna     OBJECTIVE  PAST MEDICAL & SURGICAL HISTORY  Prothrombin gene mutation  Autoimmune hepatitis  Other chronic pulmonary embolism without acute cor pulmonale  Essential hypertension  Autoimmune hepatitis treated with steroids  Asthma  DVT (deep venous thrombosis)  S/P debridement  History of back surgery    ALLERGIES:  No Known Allergies    MEDICATIONS:  STANDING MEDICATIONS  ALBUTerol    90 MICROgram(s) HFA Inhaler 1 Puff(s) Inhalation every 6 hours  calcium carbonate    500 mG (Tums) Chewable 2 Tablet(s) Chew every 8 hours  cefepime   IVPB 2000 milliGRAM(s) IV Intermittent <User Schedule>  chlorhexidine 0.12% Liquid 15 milliLiter(s) Oral Mucosa every 12 hours  chlorhexidine 4% Liquid 1 Application(s) Topical <User Schedule>  cyanocobalamin 1000 MICROGram(s) Oral daily  fentaNYL   Infusion. 0.5 MICROgram(s)/kG/Hr IV Continuous <Continuous>  gabapentin 300 milliGRAM(s) Oral at bedtime  influenza   Vaccine 0.5 milliLiter(s) IntraMuscular once  insulin regular Infusion 4 Unit(s)/Hr IV Continuous <Continuous>  ipratropium 17 MICROgram(s) HFA Inhaler 1 Puff(s) Inhalation every 6 hours  lactated ringers Bolus 500 milliLiter(s) IV Bolus once  lactated ringers. 500 milliLiter(s) IV Continuous <Continuous>  levoFLOXacin IVPB 500 milliGRAM(s) IV Intermittent every 48 hours  methylPREDNISolone sodium succinate IVPB 250 milliGRAM(s) IV Intermittent every 6 hours  midazolam Infusion 0.02 mG/kG/Hr IV Continuous <Continuous>  montelukast 10 milliGRAM(s) Oral at bedtime  multivitamin/minerals 1 Tablet(s) Oral daily  mupirocin 2% Ointment 1 Application(s) Topical two times a day  norepinephrine Infusion 0.05 MICROgram(s)/kG/Min IV Continuous <Continuous>  pantoprazole   Suspension 40 milliGRAM(s) Oral daily  senna 2 Tablet(s) Oral at bedtime  sodium bicarbonate 650 milliGRAM(s) Oral three times a day  sodium bicarbonate  Infusion 0.138 mEq/kG/Hr IV Continuous <Continuous>  tacrolimus 0.5 milliGRAM(s) Oral every 12 hours    PRN MEDICATIONS  oxyCODONE    IR 10 milliGRAM(s) Oral every 6 hours PRN    HOME MEDICATIONS  Home Medications:  acetaminophen 500 mg oral tablet: 2 tab(s) orally every 8 hours (20 Jan 2020 13:38)  amLODIPine 10 mg oral tablet: 1 tab(s) orally once a day (at bedtime) (20 Jan 2020 13:38)  budesonide 3 mg oral capsule, extended release: 1 cap(s) orally 2 times a day (05 Dec 2019 17:19)  ferrous sulfate 325 mg (65 mg elemental iron) oral delayed release tablet: 1 tab(s) orally once a day (05 Dec 2019 17:19)  furosemide 20 mg oral tablet: 1 tab(s) orally once a day (05 Dec 2019 17:19)  gabapentin 300 mg oral capsule: 1 cap(s) orally once a day (at bedtime) (20 Jan 2020 13:38)  ibuprofen 400 mg oral tablet: 1 tab(s) orally every 6 hours, As needed, Moderate Pain (4 - 6) (20 Jan 2020 13:38)  ipratropium-albuterol 0.5 mg-2.5 mg/3 mLinhalation solution: 3 milliliter(s) inhaled every 6 hours, As Needed (20 Jan 2020 13:40)  lidocaine 5% topical film: Apply topically to affected area once a day (20 Jan 2020 13:38)  Metoprolol Tartrate 50 mg oral tablet: 1 tab(s) orally once a day (05 Dec 2019 17:19)  montelukast 10 mg oral tablet: 1 tab(s) orally once a day (at bedtime) (05 Dec 2019 17:19)  Multiple Vitamins with Minerals oral tablet: 1 tab(s) orally once a day (20 Jan 2020 13:38)  oxyCODONE 10 mg oral tablet: 1 tab(s) orally every 6 hours, As needed, Severe Pain (7 - 10) (20 Jan 2020 13:38)  predniSONE 20 mg oral tablet: 3 tab(s) orally once a day (20 Jan 2020 13:38)  ProAir HFA 90 mcg/inh inhalation aerosol: 1 puff(s) inhaled every 4 hours, As Needed (20 Jan 2020 13:40)  risedronate 150 mg oral tablet: 1 tab(s) orally once a month (05 Dec 2019 17:19)  tacrolimus 0.5 mg oral capsule: 1 cap(s) orally every 12 hours (05 Dec 2019 17:19)  tiotropium 18 mcg inhalation capsule: 1 cap(s) inhaled once a day, As Needed (20 Jan 2020 13:40)  Vitamin B12 1000 mcg oral tablet: 1 tab(s) orally once a day (05 Dec 2019 17:19)  Vitamin C 1000 mg oral tablet: 1 tab(s) orally once a day (05 Dec 2019 17:19)  warfarin 4 mg oral tablet: 1 tab(s) orally once a day (at bedtime) (20 Jan 2020 13:38)      VITAL SIGNS: Last 24 Hours  ICU Vital Signs Last 24 Hrs  T(C): 37.3 (05 Feb 2020 12:00), Max: 37.3 (05 Feb 2020 12:00)  T(F): 99.2 (05 Feb 2020 12:00), Max: 99.2 (05 Feb 2020 12:00)  HR: 86 (05 Feb 2020 17:00) (80 - 106)  BP: --  BP(mean): --  ABP: 134/56 (05 Feb 2020 17:00) (88/64 - 142/60)  ABP(mean): 84 (05 Feb 2020 17:00) (70 - 88)  RR: 24 (05 Feb 2020 17:00) (23 - 43)  SpO2: 97% (05 Feb 2020 17:00) (91% - 99%)    02-04-20 @ 07:01  -  02-05-20 @ 07:00  --------------------------------------------------------  IN: 2774.6 mL / OUT: 1420 mL / NET: 1354.6 mL      LABS:                        9.5    20.16 )-----------( 96       ( 05 Feb 2020 04:00 )             31.2     02-05    138  |  107  |  62<HH>  ----------------------------<  185<H>  4.6   |  15<L>  |  3.1<H>    Ca    7.0<L>      05 Feb 2020 04:00  Mg     2.3     02-05    TPro  5.4<L>  /  Alb  3.1<L>  /  TBili  0.3  /  DBili  x   /  AST  20  /  ALT  22  /  AlkPhos  200<H>  02-05    LIVER FUNCTIONS - ( 05 Feb 2020 04:00 )  Alb: 3.1 g/dL / Pro: 5.4 g/dL / ALK PHOS: 200 U/L / ALT: 22 U/L / AST: 20 U/L / GGT: x           PT/INR - ( 04 Feb 2020 04:40 )   PT: 16.90 sec;   INR: 1.47 ratio         PTT - ( 04 Feb 2020 04:40 )  PTT:23.4 sec    ABG - ( 05 Feb 2020 09:13 )  pH, Arterial: 7.18  pH, Blood: x     /  pCO2: 45    /  pO2: 106   / HCO3: 17    / Base Excess: -10.9 /  SaO2: 98        Culture - Acid Fast - Bronchial w/Smear (collected 03 Feb 2020 15:00)  Source: .Bronchial None    Culture - Fungal, Bronchial (collected 03 Feb 2020 15:00)  Source: .Bronchial None  Preliminary Report (04 Feb 2020 08:06):    Testing in progress    Culture - Bronchial (collected 03 Feb 2020 15:00)  Source: .Bronchial None  Gram Stain (04 Feb 2020 05:52):    No polymorphonuclear cells seen per low power field    No squamous epithelial cells per low power field    No organisms seen  Preliminary Report (04 Feb 2020 20:29):    No growth    CAPILLARY BLOOD GLUCOSE    POCT Blood Glucose.: 140 mg/dL (05 Feb 2020 10:07)  Vancomycin Level, Trough (02.05.20 @ 06:20)    Vancomycin Level, Trough: 32.7:  Tacrolimus (), Serum (02.04.20 @ 16:00)    Tacrolimus (), Serum: 10.6: Tacrolimus testing is performed on the [employer]  by        Cytopathology - Non Gyn Report (02.03.20 @ 09:20)    Cytopathology - Non Gyn Report:   ACCESSION No:  [id number]    DONI PATRICK                           1        Cytopathology Report            Specimen(s) Submitted  BAL ( RML )      Clinical History  Pneumonia      Gross Description  The specimen is received fresh labeled with the patient's name  and consists of 25 ml of red fluid. One monolayer slide (Thin  Prep) is prepared and stained with Papanicolaou stain.      Final Diagnosis  BRONCHOALVEOLAR LAVAGE    ATYPICAL FINDINGS.  Rare atypical cells, few ciliated bronchial cells, alveolar  macrophages and inflammatory cells, mainly neutrophils.    Screened by: Yesica AGUILAR(ASCP)  Verified by: Mary Jane Canada M.D.  (Electronic Signature)  Reported on: 02/04/20 17:51 EST, [address] [place] [id number]  Phone: [phone]   Fax: [phone]  Cytology technical processing performed at [facility],  [address]rd Floor, [place] [address]  _________________________________________________________________  Culture - Bronchial (02.03.20 @ 15:00)    Gram Stain:   No polymorphonuclear cells seen per low power field  No squamous epithelial cells per low power field  No organisms seen    Specimen Source: .Bronchial None    Culture Results:   No growth        RADIOLOGY:  < from: Xray Chest 1 View- PORTABLE-Routine (02.05.20 @ 05:22) >  IMPRESSION:    Stable bilateral pleural plaques. Bilateral lung opacities, unchanged.    < end of copied text >    PHYSICAL EXAM:  PHYSICAL EXAM: vent: 26/400/50/12.5  GENERAL: 75yo F, intubated and sedated- No response to pain or verbal  HEAD:  Atraumatic, Normocephalic  EYES: EOMI, conjunctiva clear and sclera white  NECK: Supple, No JVD  CHEST/LUNG: Clear to auscultation bilaterally; No wheeze; No crackles; No accessory muscles used  HEART: RRR; No MRG  ABDOMEN: Soft, Nontender, Nondistended; Bowel sounds present; No guarding  EXTREMITIES:  2+ Peripheral Pulses, No cyanosis or edema; Bilateral heel wraps  NEUROLOGY: sedated,   ADMISSION SUMMARY  Patient is a 74y old Female who presents with a chief complaint of SOB and desaturation (05 Feb 2020 09:09)

## 2020-02-05 NOTE — PROGRESS NOTE ADULT - ASSESSMENT
A 74y female with PMH of asthma, COPD not on home O2, autoimmune hepatitis on prednisone and tacrolimus, heterozygous prothrombin gene mutation with hx of PE and DVT on coumadin presents to the hospital complaining of cough, hemoptysis, SOB and desaturation to 70s.     # Acute hypoxic resp failure due to flu +ve, possible bacterial infection and alveolar hemorrhage   - intubated and sedated, s/p Bronch showing some bleeding  - Solu-medrol 80mg iV q6  - s/p DDAVP q8h, 3 doses   - C/w cefepime and Levaquin D5/7 - Vanc d/c kidneys toxicity  - oseltamivir 30 mg BID for D5/10   - C/w Duoneb   - F/up autoimmune panel ( TEE, RF, GBM, ANCA, IgA endomysial ab, APS panel)  - Bronch results - neg culture, neg fungal, cytology positive for inflammatory cells, no organisms  - Pulmonary/critical care following closely/daily in ICU    #REJI now oliguric likely ATN with Vanc toxicity   - ABG and UO noted  - Cr trends up  - Renal dosing abs  - f/u FK level  - Vanc d/mark today  -give 500cc of Bicarb bolus, f/u UO and ABG  - Renal follow-up    #Diarrhea:  -hold laxative  -hold feeding for now  -monitor off laxative b4 sending c.diff    #Immunosuppressed: autoimmune hepatitis on prednisone and tacrolimus  - continue with steroid and tacrolimus    # Autoimmune hepatitis  - prednisone 60 mg PO qd at home now on methylprednisolone 250mg iv Q6H  - C/w tacrolimus 0.5 q12h, level was ok, ok to keep it, for now  - Multiyear history  - Patient immunosuppressed with this long-term therapy    # Recent CMV infection in January 2020  - CMV PCR was negative    # HTN  - now hypotension   - was on amlodipine 10 mg qd and lopressor 50 mg PO qd holding now due to hypotension  - on levophed 0.2 today  - Monitor BPs    # heterozygous prothrombin gene mutation with hx of PE and DVT on coumadin  - holding coumadin for now due to suspected alveolar hemorrhage   - monitor coags    #0.5 cm of GB polyp  -needs f/u US in 12 months     # Hx of asthma  - C/w montelukast qd    # GI PPx: Protonix 40 mg PO qd  # DVT PPx: sequentials  # Activity: bedrest  # Dispo: ICU for now  # Code Status: FULL

## 2020-02-05 NOTE — PROGRESS NOTE ADULT - SUBJECTIVE AND OBJECTIVE BOX
ELDER, DONI  74y  Female  HPI:  73y/o F w/ hx of asthma, COPD not on home O2, autoimmune hepatitis on prednisone and tacrolimus, heterozygous prothrombin gene mutation with hx of PE and DVT on coumadin with recent hospitalization in January 2020 with a diagnosis of pneumonia presents for worsening SOB, hemoptysis and cough. Everything started yesterday night when patient was in bed, started to feel shortness of breath and had many episodes of hemoptysis with clots, she also had substernal chest pain non radiating, moderate in intensity that worsens on coughing. She denies fever but endorses chills. She also admits for lightheadedness that occurred yesterday, no HA, abd pain, dysuria or paresthesias. She had 2 loose bowel movements since yesterday with some blood in the stools that she attributes to her hemorrhoids. She stopped Coumadin couple of days ago since her INR was supratherapeutic. She is from [facility] short term and also mentions that her  and another family member have the flu and she was exposed to them. She did not have the flu shot this season.  In ED, temp was 100.1 rectally, tachycardic ( sinus) , she was saturating to 70s on non rebreather and her SaO2 went up to 95%. ABG showing respiratory alkalosis initially and then corrected after BIPAP placement and correction of RR.  CTA chest showing no PE but showing interval development of multifocal bilateral groundglass opacities as well as new moderate to severe bronchiectasis in the right lung. Findings are concerning for an acute infectious/inflammatory process. (31 Jan 2020 14:36)    MEDICATIONS  (STANDING):  ALBUTerol    90 MICROgram(s) HFA Inhaler 1 Puff(s) Inhalation every 6 hours  calcium carbonate    500 mG (Tums) Chewable 2 Tablet(s) Chew every 8 hours  cefepime   IVPB 2000 milliGRAM(s) IV Intermittent <User Schedule>  chlorhexidine 0.12% Liquid 15 milliLiter(s) Oral Mucosa every 12 hours  chlorhexidine 4% Liquid 1 Application(s) Topical <User Schedule>  cyanocobalamin 1000 MICROGram(s) Oral daily  desmopressin IVPB 24 MICROGram(s) IV Intermittent <User Schedule>  fentaNYL   Infusion. 0.5 MICROgram(s)/kG/Hr (3.77 mL/Hr) IV Continuous <Continuous>  gabapentin 300 milliGRAM(s) Oral at bedtime  influenza   Vaccine 0.5 milliLiter(s) IntraMuscular once  insulin regular Infusion 4 Unit(s)/Hr (4 mL/Hr) IV Continuous <Continuous>  ipratropium 17 MICROgram(s) HFA Inhaler 1 Puff(s) Inhalation every 6 hours  lactated ringers Bolus 500 milliLiter(s) IV Bolus once  lactated ringers. 500 milliLiter(s) (500 mL/Hr) IV Continuous <Continuous>  lactulose Syrup 10 Gram(s) Oral two times a day  levoFLOXacin IVPB 500 milliGRAM(s) IV Intermittent every 48 hours  methylPREDNISolone sodium succinate IVPB 250 milliGRAM(s) IV Intermittent every 6 hours  midazolam Infusion 0.02 mG/kG/Hr (1.508 mL/Hr) IV Continuous <Continuous>  montelukast 10 milliGRAM(s) Oral at bedtime  multivitamin/minerals 1 Tablet(s) Oral daily  mupirocin 2% Ointment 1 Application(s) Topical two times a day  norepinephrine Infusion 0.05 MICROgram(s)/kG/Min (3.534 mL/Hr) IV Continuous <Continuous>  oseltamivir 30 milliGRAM(s) Oral <User Schedule>  pantoprazole   Suspension 40 milliGRAM(s) Oral daily  senna 2 Tablet(s) Oral at bedtime  sodium bicarbonate 650 milliGRAM(s) Oral three times a day  tacrolimus 0.5 milliGRAM(s) Oral every 12 hours    MEDICATIONS  (PRN):  oxyCODONE    IR 10 milliGRAM(s) Oral every 6 hours PRN Severe Pain (7 - 10)    INTERVAL EVENTS: Patient seen today, chart reviewed. Patient remains intubated on pressors    T(C): 36.6 (02-04-20 @ 20:00), Max: 37.2 (02-04-20 @ 04:00)  HR: 90 (02-04-20 @ 22:30) (72 - 100)  BP: 112/64 (02-04-20 @ 17:30) (76/53 - 119/63)  RR: 31 (02-04-20 @ 22:30) (18 - 36)  SpO2: 99% (02-04-20 @ 22:30) (85% - 100%)  Wt(kg): --Vital Signs Last 24 Hrs  T(C): 36.6 (04 Feb 2020 20:00), Max: 37.2 (04 Feb 2020 04:00)  T(F): 97.8 (04 Feb 2020 20:00), Max: 98.9 (04 Feb 2020 04:00)  HR: 90 (04 Feb 2020 22:30) (72 - 100)  BP: 112/64 (04 Feb 2020 17:30) (76/53 - 119/63)  BP(mean): 83 (04 Feb 2020 17:30) (61 - 88)  RR: 31 (04 Feb 2020 22:30) (18 - 36)  SpO2: 99% (04 Feb 2020 22:30) (85% - 100%)    PHYSICAL EXAM:  GENERAL:   NECK: Supple, No JVD  CHEST/LUNG:  tranmittable sounds  HEART: S1, S2, Regular rate and rhythm  ABDOMEN: Soft, Nontender, Bowel sounds present  EXTREMITIES: ++ edema  SKIN: bruises    LABS:                        10.4   19.54 )-----------( 102      ( 04 Feb 2020 04:40 )             33.2             02-04    137  |  107  |  59<H>  ----------------------------<  161<H>  4.7   |  16<L>  |  3.1<H>    Ca    6.7<L>      04 Feb 2020 20:00  Phos  3.2     02-03  Mg     2.3     02-04    TPro  5.3<L>  /  Alb  2.8<L>  /  TBili  0.4  /  DBili  x   /  AST  15  /  ALT  24  /  AlkPhos  203<H>  02-04    LIVER FUNCTIONS - ( 04 Feb 2020 04:40 )  Alb: 2.8 g/dL / Pro: 5.3 g/dL / ALK PHOS: 203 U/L / ALT: 24 U/L / AST: 15 U/L / GGT: x                   PT/INR - ( 04 Feb 2020 04:40 )   PT: 16.90 sec;   INR: 1.47 ratio       PTT - ( 04 Feb 2020 04:40 )  PTT:23.4 sec      ABG - ( 04 Feb 2020 13:24 )  pH, Arterial: 7.22  pH, Blood: x     /  pCO2: 42    /  pO2: 61    / HCO3: 17    / Base Excess: -10.2 /  SaO2: 90          Culture - Acid Fast - Bronchial w/Smear (collected 03 Feb 2020 15:00)  Source: .Bronchial None    Culture - Fungal, Bronchial (collected 03 Feb 2020 15:00)  Source: .Bronchial None  Preliminary Report (04 Feb 2020 08:06):    Testing in progress    Culture - Bronchial (collected 03 Feb 2020 15:00)  Source: .Bronchial None  Gram Stain (04 Feb 2020 05:52):    No polymorphonuclear cells seen per low power field    No squamous epithelial cells per low power field    No organisms seen  Preliminary Report (04 Feb 2020 20:29):    No growth      RADIOLOGY & ADDITIONAL TESTS:  < from: Xray Chest 1 View- PORTABLE-Routine (02.04.20 @ 04:53) >  FINDINGS:    SUPPORT DEVICES: Endotracheal and enteric tubes are in stable positions. Stable right internal jugular central venous catheter.    CARDIAC/MEDIASTINUM/HILUM: Stable, magnified cardiac silhouette.    LUNG PARENCHYMA/PLEURA: Stable bilateral pleural plaques. Bilateral lung opacities, unchanged. No pneumothorax.    SKELETON/SOFT TISSUES: Stable. T12 kyphoplasty.    IMPRESSION:      Stable bilateral pleural plaques. Bilateral lung opacities, unchanged.        < end of copied text >

## 2020-02-05 NOTE — PROGRESS NOTE ADULT - SUBJECTIVE AND OBJECTIVE BOX
DONI ELDER 74y Female  MRN#: [id number]     SUBJECTIVE  Patient is a 74y old Female who presents with a chief complaint of SOB and desaturation (05 Feb 2020 09:09)        OBJECTIVE  PAST MEDICAL & SURGICAL HISTORY  Prothrombin gene mutation  Autoimmune hepatitis  Other chronic pulmonary embolism without acute cor pulmonale  Essential hypertension  Autoimmune hepatitis treated with steroids  Asthma  DVT (deep venous thrombosis)  S/P debridement  History of back surgery    ALLERGIES:  No Known Allergies    MEDICATIONS:  STANDING MEDICATIONS  ALBUTerol    90 MICROgram(s) HFA Inhaler 1 Puff(s) Inhalation every 6 hours  calcium carbonate    500 mG (Tums) Chewable 2 Tablet(s) Chew every 8 hours  cefepime   IVPB 2000 milliGRAM(s) IV Intermittent <User Schedule>  chlorhexidine 0.12% Liquid 15 milliLiter(s) Oral Mucosa every 12 hours  chlorhexidine 4% Liquid 1 Application(s) Topical <User Schedule>  cyanocobalamin 1000 MICROGram(s) Oral daily  fentaNYL   Infusion. 0.5 MICROgram(s)/kG/Hr IV Continuous <Continuous>  gabapentin 300 milliGRAM(s) Oral at bedtime  influenza   Vaccine 0.5 milliLiter(s) IntraMuscular once  insulin regular Infusion 4 Unit(s)/Hr IV Continuous <Continuous>  ipratropium 17 MICROgram(s) HFA Inhaler 1 Puff(s) Inhalation every 6 hours  lactated ringers Bolus 500 milliLiter(s) IV Bolus once  lactated ringers. 500 milliLiter(s) IV Continuous <Continuous>  levoFLOXacin IVPB 500 milliGRAM(s) IV Intermittent every 48 hours  methylPREDNISolone sodium succinate IVPB 250 milliGRAM(s) IV Intermittent every 6 hours  midazolam Infusion 0.02 mG/kG/Hr IV Continuous <Continuous>  montelukast 10 milliGRAM(s) Oral at bedtime  multivitamin/minerals 1 Tablet(s) Oral daily  mupirocin 2% Ointment 1 Application(s) Topical two times a day  norepinephrine Infusion 0.05 MICROgram(s)/kG/Min IV Continuous <Continuous>  pantoprazole   Suspension 40 milliGRAM(s) Oral daily  senna 2 Tablet(s) Oral at bedtime  sodium bicarbonate 650 milliGRAM(s) Oral three times a day  sodium bicarbonate  Infusion 0.138 mEq/kG/Hr IV Continuous <Continuous>  tacrolimus 0.5 milliGRAM(s) Oral every 12 hours    PRN MEDICATIONS  oxyCODONE    IR 10 milliGRAM(s) Oral every 6 hours PRN    HOME MEDICATIONS  Home Medications:  acetaminophen 500 mg oral tablet: 2 tab(s) orally every 8 hours (20 Jan 2020 13:38)  amLODIPine 10 mg oral tablet: 1 tab(s) orally once a day (at bedtime) (20 Jan 2020 13:38)  budesonide 3 mg oral capsule, extended release: 1 cap(s) orally 2 times a day (05 Dec 2019 17:19)  ferrous sulfate 325 mg (65 mg elemental iron) oral delayed release tablet: 1 tab(s) orally once a day (05 Dec 2019 17:19)  furosemide 20 mg oral tablet: 1 tab(s) orally once a day (05 Dec 2019 17:19)  gabapentin 300 mg oral capsule: 1 cap(s) orally once a day (at bedtime) (20 Jan 2020 13:38)  ibuprofen 400 mg oral tablet: 1 tab(s) orally every 6 hours, As needed, Moderate Pain (4 - 6) (20 Jan 2020 13:38)  ipratropium-albuterol 0.5 mg-2.5 mg/3 mLinhalation solution: 3 milliliter(s) inhaled every 6 hours, As Needed (20 Jan 2020 13:40)  lidocaine 5% topical film: Apply topically to affected area once a day (20 Jan 2020 13:38)  Metoprolol Tartrate 50 mg oral tablet: 1 tab(s) orally once a day (05 Dec 2019 17:19)  montelukast 10 mg oral tablet: 1 tab(s) orally once a day (at bedtime) (05 Dec 2019 17:19)  Multiple Vitamins with Minerals oral tablet: 1 tab(s) orally once a day (20 Jan 2020 13:38)  oxyCODONE 10 mg oral tablet: 1 tab(s) orally every 6 hours, As needed, Severe Pain (7 - 10) (20 Jan 2020 13:38)  predniSONE 20 mg oral tablet: 3 tab(s) orally once a day (20 Jan 2020 13:38)  ProAir HFA 90 mcg/inh inhalation aerosol: 1 puff(s) inhaled every 4 hours, As Needed (20 Jan 2020 13:40)  risedronate 150 mg oral tablet: 1 tab(s) orally once a month (05 Dec 2019 17:19)  tacrolimus 0.5 mg oral capsule: 1 cap(s) orally every 12 hours (05 Dec 2019 17:19)  tiotropium 18 mcg inhalation capsule: 1 cap(s) inhaled once a day, As Needed (20 Jan 2020 13:40)  Vitamin B12 1000 mcg oral tablet: 1 tab(s) orally once a day (05 Dec 2019 17:19)  Vitamin C 1000 mg oral tablet: 1 tab(s) orally once a day (05 Dec 2019 17:19)  warfarin 4 mg oral tablet: 1 tab(s) orally once a day (at bedtime) (20 Jan 2020 13:38)      VITAL SIGNS: Last 24 Hours  T(C): 36.4 (05 Feb 2020 08:00), Max: 37.1 (05 Feb 2020 04:00)  T(F): 97.6 (05 Feb 2020 08:00), Max: 98.7 (05 Feb 2020 04:00)  HR: 88 (05 Feb 2020 09:19) (74 - 106)  BP: 112/64 (04 Feb 2020 17:30) (76/53 - 112/64)  BP(mean): 83 (04 Feb 2020 17:30) (61 - 84)  RR: 30 (05 Feb 2020 08:00) (18 - 32)  SpO2: 99% (05 Feb 2020 09:19) (85% - 99%)    02-04-20 @ 07:01  -  02-05-20 @ 07:00  --------------------------------------------------------  IN: 2774.6 mL / OUT: 1420 mL / NET: 1354.6 mL      LABS:                        9.5    20.16 )-----------( 96       ( 05 Feb 2020 04:00 )             31.2     02-05    138  |  107  |  62<HH>  ----------------------------<  185<H>  4.6   |  15<L>  |  3.1<H>    Ca    7.0<L>      05 Feb 2020 04:00  Mg     2.3     02-05    TPro  5.4<L>  /  Alb  3.1<L>  /  TBili  0.3  /  DBili  x   /  AST  20  /  ALT  22  /  AlkPhos  200<H>  02-05    LIVER FUNCTIONS - ( 05 Feb 2020 04:00 )  Alb: 3.1 g/dL / Pro: 5.4 g/dL / ALK PHOS: 200 U/L / ALT: 22 U/L / AST: 20 U/L / GGT: x           PT/INR - ( 04 Feb 2020 04:40 )   PT: 16.90 sec;   INR: 1.47 ratio         PTT - ( 04 Feb 2020 04:40 )  PTT:23.4 sec    ABG - ( 05 Feb 2020 09:13 )  pH, Arterial: 7.18  pH, Blood: x     /  pCO2: 45    /  pO2: 106   / HCO3: 17    / Base Excess: -10.9 /  SaO2: 98        Culture - Acid Fast - Bronchial w/Smear (collected 03 Feb 2020 15:00)  Source: .Bronchial None    Culture - Fungal, Bronchial (collected 03 Feb 2020 15:00)  Source: .Bronchial None  Preliminary Report (04 Feb 2020 08:06):    Testing in progress    Culture - Bronchial (collected 03 Feb 2020 15:00)  Source: .Bronchial None  Gram Stain (04 Feb 2020 05:52):    No polymorphonuclear cells seen per low power field    No squamous epithelial cells per low power field    No organisms seen  Preliminary Report (04 Feb 2020 20:29):    No growth    CAPILLARY BLOOD GLUCOSE    POCT Blood Glucose.: 140 mg/dL (05 Feb 2020 10:07)    RADIOLOGY:    PHYSICAL EXAM:  PHYSICAL EXAM:  GENERAL: 75yo F, intubated and sedated  HEAD:  Atraumatic, Normocephalic  EYES: EOMI, conjunctiva clear and sclera white  NECK: Supple, No JVD  CHEST/LUNG: Clear to auscultation bilaterally; No wheeze; No crackles; No accessory muscles used  HEART: Regular rate and rhythm; No murmurs;   ABDOMEN: Soft, Nontender, Nondistended; Bowel sounds present; No guarding  EXTREMITIES:  2+ Peripheral Pulses, No cyanosis or edema  NEUROLOGY: sedated    ADMISSION SUMMARY  Patient is a 74y old Female who presents with a chief complaint of SOB and desaturation (05 Feb 2020 09:09) DONI PATRICK 74y Female  MRN#: [id number]     SUBJECTIVE  Patient is a 74y old Female who presents with a chief complaint of SOB and desaturation (05 Feb 2020 09:09)    Interval History: Today is hospital day 5. The patient is still sedated and intubated, on levophed 0.2 as of this morning. Yesterday the patient was weaned off pressors but required to return on levophed overnight. She is still oliguric. No acute events overnight.     OBJECTIVE  PAST MEDICAL & SURGICAL HISTORY  Prothrombin gene mutation  Autoimmune hepatitis  Other chronic pulmonary embolism without acute cor pulmonale  Essential hypertension  Autoimmune hepatitis treated with steroids  Asthma  DVT (deep venous thrombosis)  S/P debridement  History of back surgery    ALLERGIES:  No Known Allergies    MEDICATIONS:  STANDING MEDICATIONS  ALBUTerol    90 MICROgram(s) HFA Inhaler 1 Puff(s) Inhalation every 6 hours  calcium carbonate    500 mG (Tums) Chewable 2 Tablet(s) Chew every 8 hours  cefepime   IVPB 2000 milliGRAM(s) IV Intermittent <User Schedule>  chlorhexidine 0.12% Liquid 15 milliLiter(s) Oral Mucosa every 12 hours  chlorhexidine 4% Liquid 1 Application(s) Topical <User Schedule>  cyanocobalamin 1000 MICROGram(s) Oral daily  fentaNYL   Infusion. 0.5 MICROgram(s)/kG/Hr IV Continuous <Continuous>  gabapentin 300 milliGRAM(s) Oral at bedtime  influenza   Vaccine 0.5 milliLiter(s) IntraMuscular once  insulin regular Infusion 4 Unit(s)/Hr IV Continuous <Continuous>  ipratropium 17 MICROgram(s) HFA Inhaler 1 Puff(s) Inhalation every 6 hours  lactated ringers Bolus 500 milliLiter(s) IV Bolus once  lactated ringers. 500 milliLiter(s) IV Continuous <Continuous>  levoFLOXacin IVPB 500 milliGRAM(s) IV Intermittent every 48 hours  methylPREDNISolone sodium succinate IVPB 250 milliGRAM(s) IV Intermittent every 6 hours  midazolam Infusion 0.02 mG/kG/Hr IV Continuous <Continuous>  montelukast 10 milliGRAM(s) Oral at bedtime  multivitamin/minerals 1 Tablet(s) Oral daily  mupirocin 2% Ointment 1 Application(s) Topical two times a day  norepinephrine Infusion 0.05 MICROgram(s)/kG/Min IV Continuous <Continuous>  pantoprazole   Suspension 40 milliGRAM(s) Oral daily  senna 2 Tablet(s) Oral at bedtime  sodium bicarbonate 650 milliGRAM(s) Oral three times a day  sodium bicarbonate  Infusion 0.138 mEq/kG/Hr IV Continuous <Continuous>  tacrolimus 0.5 milliGRAM(s) Oral every 12 hours    PRN MEDICATIONS  oxyCODONE    IR 10 milliGRAM(s) Oral every 6 hours PRN    HOME MEDICATIONS  Home Medications:  acetaminophen 500 mg oral tablet: 2 tab(s) orally every 8 hours (20 Jan 2020 13:38)  amLODIPine 10 mg oral tablet: 1 tab(s) orally once a day (at bedtime) (20 Jan 2020 13:38)  budesonide 3 mg oral capsule, extended release: 1 cap(s) orally 2 times a day (05 Dec 2019 17:19)  ferrous sulfate 325 mg (65 mg elemental iron) oral delayed release tablet: 1 tab(s) orally once a day (05 Dec 2019 17:19)  furosemide 20 mg oral tablet: 1 tab(s) orally once a day (05 Dec 2019 17:19)  gabapentin 300 mg oral capsule: 1 cap(s) orally once a day (at bedtime) (20 Jan 2020 13:38)  ibuprofen 400 mg oral tablet: 1 tab(s) orally every 6 hours, As needed, Moderate Pain (4 - 6) (20 Jan 2020 13:38)  ipratropium-albuterol 0.5 mg-2.5 mg/3 mLinhalation solution: 3 milliliter(s) inhaled every 6 hours, As Needed (20 Jan 2020 13:40)  lidocaine 5% topical film: Apply topically to affected area once a day (20 Jan 2020 13:38)  Metoprolol Tartrate 50 mg oral tablet: 1 tab(s) orally once a day (05 Dec 2019 17:19)  montelukast 10 mg oral tablet: 1 tab(s) orally once a day (at bedtime) (05 Dec 2019 17:19)  Multiple Vitamins with Minerals oral tablet: 1 tab(s) orally once a day (20 Jan 2020 13:38)  oxyCODONE 10 mg oral tablet: 1 tab(s) orally every 6 hours, As needed, Severe Pain (7 - 10) (20 Jan 2020 13:38)  predniSONE 20 mg oral tablet: 3 tab(s) orally once a day (20 Jan 2020 13:38)  ProAir HFA 90 mcg/inh inhalation aerosol: 1 puff(s) inhaled every 4 hours, As Needed (20 Jan 2020 13:40)  risedronate 150 mg oral tablet: 1 tab(s) orally once a month (05 Dec 2019 17:19)  tacrolimus 0.5 mg oral capsule: 1 cap(s) orally every 12 hours (05 Dec 2019 17:19)  tiotropium 18 mcg inhalation capsule: 1 cap(s) inhaled once a day, As Needed (20 Jan 2020 13:40)  Vitamin B12 1000 mcg oral tablet: 1 tab(s) orally once a day (05 Dec 2019 17:19)  Vitamin C 1000 mg oral tablet: 1 tab(s) orally once a day (05 Dec 2019 17:19)  warfarin 4 mg oral tablet: 1 tab(s) orally once a day (at bedtime) (20 Jan 2020 13:38)      VITAL SIGNS: Last 24 Hours  T(C): 36.4 (05 Feb 2020 08:00), Max: 37.1 (05 Feb 2020 04:00)  T(F): 97.6 (05 Feb 2020 08:00), Max: 98.7 (05 Feb 2020 04:00)  HR: 88 (05 Feb 2020 09:19) (74 - 106)  BP: 112/64 (04 Feb 2020 17:30) (76/53 - 112/64)  BP(mean): 83 (04 Feb 2020 17:30) (61 - 84)  RR: 30 (05 Feb 2020 08:00) (18 - 32)  SpO2: 99% (05 Feb 2020 09:19) (85% - 99%)    02-04-20 @ 07:01  -  02-05-20 @ 07:00  --------------------------------------------------------  IN: 2774.6 mL / OUT: 1420 mL / NET: 1354.6 mL      LABS:                        9.5    20.16 )-----------( 96       ( 05 Feb 2020 04:00 )             31.2     02-05    138  |  107  |  62<HH>  ----------------------------<  185<H>  4.6   |  15<L>  |  3.1<H>    Ca    7.0<L>      05 Feb 2020 04:00  Mg     2.3     02-05    TPro  5.4<L>  /  Alb  3.1<L>  /  TBili  0.3  /  DBili  x   /  AST  20  /  ALT  22  /  AlkPhos  200<H>  02-05    LIVER FUNCTIONS - ( 05 Feb 2020 04:00 )  Alb: 3.1 g/dL / Pro: 5.4 g/dL / ALK PHOS: 200 U/L / ALT: 22 U/L / AST: 20 U/L / GGT: x           PT/INR - ( 04 Feb 2020 04:40 )   PT: 16.90 sec;   INR: 1.47 ratio         PTT - ( 04 Feb 2020 04:40 )  PTT:23.4 sec    ABG - ( 05 Feb 2020 09:13 )  pH, Arterial: 7.18  pH, Blood: x     /  pCO2: 45    /  pO2: 106   / HCO3: 17    / Base Excess: -10.9 /  SaO2: 98        Culture - Acid Fast - Bronchial w/Smear (collected 03 Feb 2020 15:00)  Source: .Bronchial None    Culture - Fungal, Bronchial (collected 03 Feb 2020 15:00)  Source: .Bronchial None  Preliminary Report (04 Feb 2020 08:06):    Testing in progress    Culture - Bronchial (collected 03 Feb 2020 15:00)  Source: .Bronchial None  Gram Stain (04 Feb 2020 05:52):    No polymorphonuclear cells seen per low power field    No squamous epithelial cells per low power field    No organisms seen  Preliminary Report (04 Feb 2020 20:29):    No growth    CAPILLARY BLOOD GLUCOSE    POCT Blood Glucose.: 140 mg/dL (05 Feb 2020 10:07)    RADIOLOGY:  < from: Xray Chest 1 View- PORTABLE-Routine (02.05.20 @ 05:22) >  IMPRESSION:    Stable bilateral pleural plaques. Bilateral lung opacities, unchanged.    < end of copied text >    PHYSICAL EXAM:  PHYSICAL EXAM:  GENERAL: 75yo F, intubated and sedated  HEAD:  Atraumatic, Normocephalic  EYES: EOMI, conjunctiva clear and sclera white  NECK: Supple, No JVD  CHEST/LUNG: Clear to auscultation bilaterally; No wheeze; No crackles; No accessory muscles used  HEART: Regular rate and rhythm; No murmurs;   ABDOMEN: Soft, Nontender, Nondistended; Bowel sounds present; No guarding  EXTREMITIES:  2+ Peripheral Pulses, No cyanosis or edema  NEUROLOGY: sedated    ADMISSION SUMMARY  Patient is a 74y old Female who presents with a chief complaint of SOB and desaturation (05 Feb 2020 09:09) DONI PATRICK 74y Female  MRN#: [id number]     SUBJECTIVE  Patient is a 74y old Female who presents with a chief complaint of SOB and desaturation (05 Feb 2020 09:09)    Interval History: Today is hospital day 5. The patient is still sedated and intubated, on levophed 0.2 as of this morning. Yesterday the patient was weaned off pressors but required to return on levophed overnight. She is still oliguric. events overnight: no fever, noted to have diarrhea, on lactulose and senna     OBJECTIVE  PAST MEDICAL & SURGICAL HISTORY  Prothrombin gene mutation  Autoimmune hepatitis  Other chronic pulmonary embolism without acute cor pulmonale  Essential hypertension  Autoimmune hepatitis treated with steroids  Asthma  DVT (deep venous thrombosis)  S/P debridement  History of back surgery    ALLERGIES:  No Known Allergies    MEDICATIONS:  STANDING MEDICATIONS  ALBUTerol    90 MICROgram(s) HFA Inhaler 1 Puff(s) Inhalation every 6 hours  calcium carbonate    500 mG (Tums) Chewable 2 Tablet(s) Chew every 8 hours  cefepime   IVPB 2000 milliGRAM(s) IV Intermittent <User Schedule>  chlorhexidine 0.12% Liquid 15 milliLiter(s) Oral Mucosa every 12 hours  chlorhexidine 4% Liquid 1 Application(s) Topical <User Schedule>  cyanocobalamin 1000 MICROGram(s) Oral daily  fentaNYL   Infusion. 0.5 MICROgram(s)/kG/Hr IV Continuous <Continuous>  gabapentin 300 milliGRAM(s) Oral at bedtime  influenza   Vaccine 0.5 milliLiter(s) IntraMuscular once  insulin regular Infusion 4 Unit(s)/Hr IV Continuous <Continuous>  ipratropium 17 MICROgram(s) HFA Inhaler 1 Puff(s) Inhalation every 6 hours  lactated ringers Bolus 500 milliLiter(s) IV Bolus once  lactated ringers. 500 milliLiter(s) IV Continuous <Continuous>  levoFLOXacin IVPB 500 milliGRAM(s) IV Intermittent every 48 hours  methylPREDNISolone sodium succinate IVPB 250 milliGRAM(s) IV Intermittent every 6 hours  midazolam Infusion 0.02 mG/kG/Hr IV Continuous <Continuous>  montelukast 10 milliGRAM(s) Oral at bedtime  multivitamin/minerals 1 Tablet(s) Oral daily  mupirocin 2% Ointment 1 Application(s) Topical two times a day  norepinephrine Infusion 0.05 MICROgram(s)/kG/Min IV Continuous <Continuous>  pantoprazole   Suspension 40 milliGRAM(s) Oral daily  senna 2 Tablet(s) Oral at bedtime  sodium bicarbonate 650 milliGRAM(s) Oral three times a day  sodium bicarbonate  Infusion 0.138 mEq/kG/Hr IV Continuous <Continuous>  tacrolimus 0.5 milliGRAM(s) Oral every 12 hours    PRN MEDICATIONS  oxyCODONE    IR 10 milliGRAM(s) Oral every 6 hours PRN    HOME MEDICATIONS  Home Medications:  acetaminophen 500 mg oral tablet: 2 tab(s) orally every 8 hours (20 Jan 2020 13:38)  amLODIPine 10 mg oral tablet: 1 tab(s) orally once a day (at bedtime) (20 Jan 2020 13:38)  budesonide 3 mg oral capsule, extended release: 1 cap(s) orally 2 times a day (05 Dec 2019 17:19)  ferrous sulfate 325 mg (65 mg elemental iron) oral delayed release tablet: 1 tab(s) orally once a day (05 Dec 2019 17:19)  furosemide 20 mg oral tablet: 1 tab(s) orally once a day (05 Dec 2019 17:19)  gabapentin 300 mg oral capsule: 1 cap(s) orally once a day (at bedtime) (20 Jan 2020 13:38)  ibuprofen 400 mg oral tablet: 1 tab(s) orally every 6 hours, As needed, Moderate Pain (4 - 6) (20 Jan 2020 13:38)  ipratropium-albuterol 0.5 mg-2.5 mg/3 mLinhalation solution: 3 milliliter(s) inhaled every 6 hours, As Needed (20 Jan 2020 13:40)  lidocaine 5% topical film: Apply topically to affected area once a day (20 Jan 2020 13:38)  Metoprolol Tartrate 50 mg oral tablet: 1 tab(s) orally once a day (05 Dec 2019 17:19)  montelukast 10 mg oral tablet: 1 tab(s) orally once a day (at bedtime) (05 Dec 2019 17:19)  Multiple Vitamins with Minerals oral tablet: 1 tab(s) orally once a day (20 Jan 2020 13:38)  oxyCODONE 10 mg oral tablet: 1 tab(s) orally every 6 hours, As needed, Severe Pain (7 - 10) (20 Jan 2020 13:38)  predniSONE 20 mg oral tablet: 3 tab(s) orally once a day (20 Jan 2020 13:38)  ProAir HFA 90 mcg/inh inhalation aerosol: 1 puff(s) inhaled every 4 hours, As Needed (20 Jan 2020 13:40)  risedronate 150 mg oral tablet: 1 tab(s) orally once a month (05 Dec 2019 17:19)  tacrolimus 0.5 mg oral capsule: 1 cap(s) orally every 12 hours (05 Dec 2019 17:19)  tiotropium 18 mcg inhalation capsule: 1 cap(s) inhaled once a day, As Needed (20 Jan 2020 13:40)  Vitamin B12 1000 mcg oral tablet: 1 tab(s) orally once a day (05 Dec 2019 17:19)  Vitamin C 1000 mg oral tablet: 1 tab(s) orally once a day (05 Dec 2019 17:19)  warfarin 4 mg oral tablet: 1 tab(s) orally once a day (at bedtime) (20 Jan 2020 13:38)      VITAL SIGNS: Last 24 Hours  T(C): 36.4 (05 Feb 2020 08:00), Max: 37.1 (05 Feb 2020 04:00)  T(F): 97.6 (05 Feb 2020 08:00), Max: 98.7 (05 Feb 2020 04:00)  HR: 88 (05 Feb 2020 09:19) (74 - 106)  BP: 112/64 (04 Feb 2020 17:30) (76/53 - 112/64)  BP(mean): 83 (04 Feb 2020 17:30) (61 - 84)  RR: 30 (05 Feb 2020 08:00) (18 - 32)  SpO2: 99% (05 Feb 2020 09:19) (85% - 99%)    02-04-20 @ 07:01  -  02-05-20 @ 07:00  --------------------------------------------------------  IN: 2774.6 mL / OUT: 1420 mL / NET: 1354.6 mL      LABS:                        9.5    20.16 )-----------( 96       ( 05 Feb 2020 04:00 )             31.2     02-05    138  |  107  |  62<HH>  ----------------------------<  185<H>  4.6   |  15<L>  |  3.1<H>    Ca    7.0<L>      05 Feb 2020 04:00  Mg     2.3     02-05    TPro  5.4<L>  /  Alb  3.1<L>  /  TBili  0.3  /  DBili  x   /  AST  20  /  ALT  22  /  AlkPhos  200<H>  02-05    LIVER FUNCTIONS - ( 05 Feb 2020 04:00 )  Alb: 3.1 g/dL / Pro: 5.4 g/dL / ALK PHOS: 200 U/L / ALT: 22 U/L / AST: 20 U/L / GGT: x           PT/INR - ( 04 Feb 2020 04:40 )   PT: 16.90 sec;   INR: 1.47 ratio         PTT - ( 04 Feb 2020 04:40 )  PTT:23.4 sec    ABG - ( 05 Feb 2020 09:13 )  pH, Arterial: 7.18  pH, Blood: x     /  pCO2: 45    /  pO2: 106   / HCO3: 17    / Base Excess: -10.9 /  SaO2: 98        Culture - Acid Fast - Bronchial w/Smear (collected 03 Feb 2020 15:00)  Source: .Bronchial None    Culture - Fungal, Bronchial (collected 03 Feb 2020 15:00)  Source: .Bronchial None  Preliminary Report (04 Feb 2020 08:06):    Testing in progress    Culture - Bronchial (collected 03 Feb 2020 15:00)  Source: .Bronchial None  Gram Stain (04 Feb 2020 05:52):    No polymorphonuclear cells seen per low power field    No squamous epithelial cells per low power field    No organisms seen  Preliminary Report (04 Feb 2020 20:29):    No growth    CAPILLARY BLOOD GLUCOSE    POCT Blood Glucose.: 140 mg/dL (05 Feb 2020 10:07)  Vancomycin Level, Trough (02.05.20 @ 06:20)    Vancomycin Level, Trough: 32.7:  Tacrolimus (), Serum (02.04.20 @ 16:00)    Tacrolimus (), Serum: 10.6: Tacrolimus testing is performed on the [employer]  by        Cytopathology - Non Gyn Report (02.03.20 @ 09:20)    Cytopathology - Non Gyn Report:   ACCESSION No:  [id number]    DONI PATRICK                           1        Cytopathology Report            Specimen(s) Submitted  BAL ( RML )      Clinical History  Pneumonia      Gross Description  The specimen is received fresh labeled with the patient's name  and consists of 25 ml of red fluid. One monolayer slide (Thin  Prep) is prepared and stained with Papanicolaou stain.      Final Diagnosis  BRONCHOALVEOLAR LAVAGE    ATYPICAL FINDINGS.  Rare atypical cells, few ciliated bronchial cells, alveolar  macrophages and inflammatory cells, mainly neutrophils.    Screened by: Yesica Alvarado CT(ASCP)  Verified by: Mary Jane Canada M.D.  (Electronic Signature)  Reported on: 02/04/20 17:51 EST, [address] [place] [address], [place] [address]  Phone: [phone]   Fax: [phone]  Cytology technical processing performed at [facility],  [address]rd Floor, [place] [address]  _________________________________________________________________  Culture - Bronchial (02.03.20 @ 15:00)    Gram Stain:   No polymorphonuclear cells seen per low power field  No squamous epithelial cells per low power field  No organisms seen    Specimen Source: .Bronchial None    Culture Results:   No growth        RADIOLOGY:  < from: Xray Chest 1 View- PORTABLE-Routine (02.05.20 @ 05:22) >  IMPRESSION:    Stable bilateral pleural plaques. Bilateral lung opacities, unchanged.    < end of copied text >    PHYSICAL EXAM:  PHYSICAL EXAM:  GENERAL: 75yo F, intubated and sedated  HEAD:  Atraumatic, Normocephalic  EYES: EOMI, conjunctiva clear and sclera white  NECK: Supple, No JVD  CHEST/LUNG: Clear to auscultation bilaterally; No wheeze; No crackles; No accessory muscles used  HEART: Regular rate and rhythm; No murmurs;   ABDOMEN: Soft, Nontender, Nondistended; Bowel sounds present; No guarding  EXTREMITIES:  2+ Peripheral Pulses, No cyanosis or edema  NEUROLOGY: sedated, agitated off sedation  ADMISSION SUMMARY  Patient is a 74y old Female who presents with a chief complaint of SOB and desaturation (05 Feb 2020 09:09)

## 2020-02-05 NOTE — PROGRESS NOTE ADULT - ASSESSMENT
A 74y female with PMH of asthma, COPD not on home O2, autoimmune hepatitis on prednisone and tacrolimus, heterozygous prothrombin gene mutation with hx of PE and DVT on coumadin presents to the hospital complaining of cough, hemoptysis, SOB and desaturation to 70s.     # Acute hypoxic resp failure due to flu +ve, possible bacterial infection and alveolar hemorrhage   - intubated and sedated, s/p Bronch showing some bleeding  - C/w Solu-medrol 250 mg iV q6  - s/p DDAVP q8h, 3 doses   - C/w cefepime and Levaquin - Vanc d/c per ID+Nephro because of REJI  - oseltamivir 30 mg BID for 10 days - course finished  - C/w Duoneb   - F/up autoimmune panel ( TEE, RF, GBM, ANCA, IgA endomysial ab, APS panel)  - Bronch results - neg culture, neg fungal, cytology positive for inflammatory cells, no organisms    #REJI now oliguric likely ATN  - Cr trends up  - Renal dosing abs  - Vanc d/mark today  - per nephro note today - suggested to give Lasix 60 IV today    #Immunosuppressed: autoimmune hepatitis on prednisone and tacrolimus  - continue with prednisone and tacrolimus    # Autoimmune hepatitis  - prednisone 60 mg PO qd at home now on methylprednisolone 250mg iv Q6H  - C/w tacrolimus 0.5 q12h, level was ok, ok to keep it, for now    # Recent CMV infection in January 2020  - CMV PCR was negative    # HTN  - now hypotension   - was on amlodipine 10 mg qd and lopressor 50 mg PO qd holding now due to hypotension  - on levophed 0.2 as of this morning  - Monitor BPs    # heterozygous prothrombin gene mutation with hx of PE and DVT on coumadin  - holding coumadin for now due to suspected alveolar hemorrhage   - INR 1.47 as of 2/4    #0.5 cm of GB polyp  -needs f/u US in 12 months     # Hx of asthma  - C/w montelukast qd    # GI PPx: Protonix 40 mg PO qd  # DVT PPx: sequentials  # Activity: bedrest  # Dispo: ICU for now  # Code Status: FULL A 74y female with PMH of asthma, COPD not on home O2, autoimmune hepatitis on prednisone and tacrolimus, heterozygous prothrombin gene mutation with hx of PE and DVT on coumadin presents to the hospital complaining of cough, hemoptysis, SOB and desaturation to 70s.     # Acute hypoxic resp failure due to flu +ve, possible bacterial infection and alveolar hemorrhage   - intubated and sedated, s/p Bronch showing some bleeding  - Solu-medrol 80mg iV q6  - s/p DDAVP q8h, 3 doses   - C/w cefepime and Levaquin D5/7 - Vanc d/c kidneys toxicity  - oseltamivir 30 mg BID for D5/10   - C/w Duoneb   - F/up autoimmune panel ( TEE, RF, GBM, ANCA, IgA endomysial ab, APS panel)  - Bronch results - neg culture, neg fungal, cytology positive for inflammatory cells, no organisms    #REJI now oliguric likely ATN with Vanc toxicity   - ABG and UO noted  - Cr trends up  - Renal dosing abs  - f/u FK level  - Vanc d/mark today  -give 500cc of Bicarb bolus, f/u UO and ABG    #Diarrhea:  -hold laxative  -hold feeding for now  -monitor off laxative b4 sending c.diff    #Immunosuppressed: autoimmune hepatitis on prednisone and tacrolimus  - continue with steroid and tacrolimus    # Autoimmune hepatitis  - prednisone 60 mg PO qd at home now on methylprednisolone 250mg iv Q6H  - C/w tacrolimus 0.5 q12h, level was ok, ok to keep it, for now    # Recent CMV infection in January 2020  - CMV PCR was negative    # HTN  - now hypotension   - was on amlodipine 10 mg qd and lopressor 50 mg PO qd holding now due to hypotension  - on levophed 0.2 as of this morning  - Monitor BPs    # heterozygous prothrombin gene mutation with hx of PE and DVT on coumadin  - holding coumadin for now due to suspected alveolar hemorrhage   - monitor coags    #0.5 cm of GB polyp  -needs f/u US in 12 months     # Hx of asthma  - C/w montelukast qd    # GI PPx: Protonix 40 mg PO qd  # DVT PPx: sequentials  # Activity: bedrest  # Dispo: ICU for now  # Code Status: FULL

## 2020-02-05 NOTE — PROGRESS NOTE ADULT - SUBJECTIVE AND OBJECTIVE BOX
DONI PATRICK  74y, Female  Allergy: No Known Allergies      CHIEF COMPLAINT: SOB and desaturation (05 Feb 2020 00:31)      INTERVAL EVENTS/HPI  - No acute events overnight, still on levophed  - T(F): , Max: 98.7 (02-05-20 @ 04:00)  - WBC Count: 20.16 (02-05-20 @ 04:00)<-- WBC Count: 19.54 (02-04-20 @ 04:40  - Creatinine, Serum: 3.1 (02-05-20 @ 04:00) <--Creatinine, Serum: 3.2 (02-05-20 @ 02:00)   Vancomycin Level, Trough: 32.7: (02.05.20 @ 06:20)      ROS  unable to obtain history secondary to patient's mental status and/or sedation     VITALS:  T(F): 98.7, Max: 98.7 (02-05-20 @ 04:00)  HR: 86  BP: 112/64  RR: 29Vital Signs Last 24 Hrs  T(C): 37.1 (05 Feb 2020 04:00), Max: 37.1 (05 Feb 2020 04:00)  T(F): 98.7 (05 Feb 2020 04:00), Max: 98.7 (05 Feb 2020 04:00)  HR: 86 (05 Feb 2020 07:30) (72 - 106)  BP: 112/64 (04 Feb 2020 17:30) (76/53 - 115/66)  BP(mean): 83 (04 Feb 2020 17:30) (61 - 84)  RR: 29 (05 Feb 2020 07:00) (18 - 32)  SpO2: 99% (05 Feb 2020 07:30) (85% - 100%)    PHYSICAL EXAM:  Gen: intubated  HEENT: Normocephalic, atraumatic  Neck: supple, no lymphadenopathy  CV: Regular rate & regular rhythm  Lungs: decreased BS at bases, no fremitus  Abdomen: Soft, BS present  Ext: Warm, well perfused, anasarca  Neuro: sedated  Skin: no rash, no erythema, ecchymoses, pustular rash chest   Lines: no phlebitis    FH: Non-contributory  Social Hx: Non-contributory    TESTS & MEASUREMENTS:                        9.5    20.16 )-----------( 96       ( 05 Feb 2020 04:00 )             31.2     02-05    138  |  107  |  62<HH>  ----------------------------<  185<H>  4.6   |  15<L>  |  3.1<H>    Ca    7.0<L>      05 Feb 2020 04:00  Mg     2.3     02-05    TPro  5.4<L>  /  Alb  3.1<L>  /  TBili  0.3  /  DBili  x   /  AST  20  /  ALT  22  /  AlkPhos  200<H>  02-05    eGFR if Non African American: 14 mL/min/1.73M2 (02-05-20 @ 04:00)  eGFR if African American: 16 mL/min/1.73M2 (02-05-20 @ 04:00)  eGFR if African American: 16 mL/min/1.73M2 (02-05-20 @ 02:00)  eGFR if Non African American: 14 mL/min/1.73M2 (02-05-20 @ 02:00)  eGFR if Non African American: 14 mL/min/1.73M2 (02-04-20 @ 20:00)  eGFR if African American: 16 mL/min/1.73M2 (02-04-20 @ 20:00)    LIVER FUNCTIONS - ( 05 Feb 2020 04:00 )  Alb: 3.1 g/dL / Pro: 5.4 g/dL / ALK PHOS: 200 U/L / ALT: 22 U/L / AST: 20 U/L / GGT: x               Culture - Acid Fast - Bronchial w/Smear (collected 02-03-20 @ 15:00)  Source: .Bronchial None    Culture - Fungal, Bronchial (collected 02-03-20 @ 15:00)  Source: .Bronchial None  Preliminary Report (02-04-20 @ 08:06):    Testing in progress    Culture - Bronchial (collected 02-03-20 @ 15:00)  Source: .Bronchial None  Gram Stain (02-04-20 @ 05:52):    No polymorphonuclear cells seen per low power field    No squamous epithelial cells per low power field    No organisms seen  Preliminary Report (02-04-20 @ 20:29):    No growth    Culture - Blood (collected 01-31-20 @ 09:50)  Source: .Blood Blood  Preliminary Report (02-01-20 @ 23:02):    No growth to date.    Culture - Blood (collected 01-31-20 @ 09:50)  Source: .Blood Blood  Preliminary Report (02-01-20 @ 23:02):    No growth to date.        Blood Gas Venous - Lactate: 1.8 mmoL/L (01-31-20 @ 12:52)  Lactate, Blood: 2.9 mmol/L (01-31-20 @ 09:25)      INFECTIOUS DISEASES TESTING  Streptococcus Pneumoniae Ag Urine: Negative (02-02-20 @ 11:00)  MRSA PCR Result.: Positive (02-01-20 @ 20:40)  Fungitell: 49 (02-01-20 @ 11:17)  Rapid RVP Result: Detected (01-31-20 @ 16:24)  Legionella Antigen, Urine: Negative (01-31-20 @ 15:36)  Streptococcus Pneumoniae Ag Urine: Negative (01-31-20 @ 15:36)  Legionella Antigen, Urine: Negative (01-20-20 @ 02:50)  MRSA PCR Result.: Positive (01-14-20 @ 13:59)  MRSA PCR Result.: Negative (08-13-19 @ 08:46)      RADIOLOGY & ADDITIONAL TESTS:  I have personally reviewed the last Chest xray  CXR      CT      CARDIOLOGY TESTING  12 Lead ECG:   Ventricular Rate 118 BPM    Atrial Rate 118 BPM    P-R Interval 150 ms    QRS Duration 80 ms    Q-T Interval 326 ms    QTC Calculation(Bezet) 456 ms    P Axis 41 degrees    R Axis -10 degrees    T Axis 11 degrees    Diagnosis Line Sinus tachycardia  Possible Left atrial enlargement  Inferior infarct , age undetermined  Abnormal ECG    Confirmed by MOHSEN SONG MD ([id number]) on 1/31/2020 12:38:55 PM (01-31-20 @ 11:06)      MEDICATIONS  ALBUTerol    90 MICROgram(s) HFA Inhaler 1  calcium carbonate    500 mG (Tums) Chewable 2  cefepime   IVPB 2000  chlorhexidine 0.12% Liquid 15  chlorhexidine 4% Liquid 1  cyanocobalamin 1000  fentaNYL   Infusion. 0.5  gabapentin 300  influenza   Vaccine 0.5  insulin regular Infusion 4  ipratropium 17 MICROgram(s) HFA Inhaler 1  lactated ringers Bolus 500  lactated ringers. 500  lactulose Syrup 10  levoFLOXacin IVPB 500  methylPREDNISolone sodium succinate IVPB 250  midazolam Infusion 0.02  montelukast 10  multivitamin/minerals 1  mupirocin 2% Ointment 1  norepinephrine Infusion 0.05  pantoprazole   Suspension 40  senna 2  sodium bicarbonate 650  tacrolimus 0.5      ANTIBIOTICS:  cefepime   IVPB 2000 milliGRAM(s) IV Intermittent <User Schedule>  levoFLOXacin IVPB 500 milliGRAM(s) IV Intermittent every 48 hours      All available historical records have been reviewed

## 2020-02-05 NOTE — PROGRESS NOTE ADULT - ASSESSMENT
75 y/o F with REJI in hospital for SOB, hemoptysis, cough.  PMH asthma, COPD not on home O2, autoimmune hepatitis on prednisone and tacrolimus, heterozygous prothrombin gene mutation with hx of PE and DVT on coumadin, recent hospitalization for pneumonia   # REJI/ ATN in nature/ vanco toxicity?  #  oliguric/ sp fluid boluses followed by lasix  will follow UO and BMP / suggest lasix 60 IV again today   # creatinine trending up/ VANCO LEVEL NOTED UP off now  / ID recs noted on cefepime levaquin and pred   # remains on pressors / HAGMA and respiratory acidosis / MV as per pulm   #ID notes appreciated follow recs   # on FK continue for now trough noted doubt true trough on low dose/ please repeat trough   # ph at goal, corrected calcium around 7.8, on Ca carbonate now   # no acute indication for RRT/ will follow closely   # will follow/ prognosis guarded

## 2020-02-05 NOTE — PROGRESS NOTE ADULT - SUBJECTIVE AND OBJECTIVE BOX
Patient is a 74y old  Female who presents with a chief complaint of SOB and desaturation (05 Feb 2020 08:34)        Over Night Events:  Remains critically ill on MV.  Back on Levophed 0.2.  Did not respond to Lasix         ROS:     CONSTITUTIONAL:   no fever   no chills.  no weight gain   no weight loss    EYES:   no discharge,   no pain  no redness,   no visual changes.    ENT:   Ears: no ear pain and no hearing problems.  Nose: no nasal congestion and no nasal drainage.  Mouth/Throat: no dysphagia,  no hoarseness and no throat pain.  Neck: no lumps, no pain, no stiffness and no swollen glands.     CARDIOVASCULAR:   Per HPI     RESPIRATORY:  Per HPI     GASTROINTESTINAL:   no abdominal pain,   no constipation,   no diarrhea,   no vomiting.    GENITOURINARY:  no dysuria,   no frequency,   no urgency  no hematuria.    MUSCULOSKELETAL:   no back pain,   no musculoskeletal pain,  no weakness.    SKIN:   no jaundice,   no lesions,   no pruritis,   no rashes.    NEURO:   Agitated off sedation     PSYCHIATRIC:   no known mental health issues  no anxiety  no depression    ALLERGIC/IMMUNOLOGIC:   No active allergic or immunologic issues        PHYSICAL EXAM    ICU Vital Signs Last 24 Hrs  T(C): 37.1 (05 Feb 2020 04:00), Max: 37.1 (05 Feb 2020 04:00)  T(F): 98.7 (05 Feb 2020 04:00), Max: 98.7 (05 Feb 2020 04:00)  HR: 86 (05 Feb 2020 07:30) (74 - 106)  BP: 112/64 (04 Feb 2020 17:30) (76/53 - 115/66)  BP(mean): 83 (04 Feb 2020 17:30) (61 - 84)  ABP: 124/52 (05 Feb 2020 07:00) (88/64 - 146/68)  ABP(mean): 76 (05 Feb 2020 07:00) (50 - 106)  RR: 29 (05 Feb 2020 07:00) (18 - 32)  SpO2: 99% (05 Feb 2020 07:30) (85% - 99%)      CONSTITUTIONAL:   Ill appearing.  Well nourished.  NAD    ENT:   Airway patent,   Mouth with normal mucosa.   No thrush    CARDIAC:   Normal rate,   Regular rhythm.     No edema      RESPIRATORY:   No wheezing  Bilateral BS  Normal chest expansion  Not tachypneic,  No use of accessory muscles    GASTROINTESTINAL:  Abdomen soft,   Non-tender,   No guarding,   + BS    MUSCULOSKELETAL:   Range of motion is not limited,  No clubbing, cyanosis    NEUROLOGICAL:   Sedated  Agitated off sedation    SKIN:   Skin normal color for race,   warm, dry   No evidence of rash.        02-04-20 @ 07:01  -  02-05-20 @ 07:00  --------------------------------------------------------  IN:    fentaNYL Infusion.: 244.6 mL    insulin regular Infusion: 96 mL    IV PiggyBack: 400 mL    Lactated Ringers IV Bolus: 1000 mL    lactated ringers.: 500 mL    midazolam Infusion: 90 mL    norepinephrine Infusion: 294 mL    sodium chloride 0.9%: 150 mL  Total IN: 2774.6 mL    OUT:    Indwelling Catheter - Urethral: 420 mL    Rectal Tube: 1000 mL  Total OUT: 1420 mL    Total NET: 1354.6 mL          LABS:                            9.5    20.16 )-----------( 96       ( 05 Feb 2020 04:00 )             31.2                                               02-05    138  |  107  |  62<HH>  ----------------------------<  185<H>  4.6   |  15<L>  |  3.1<H>    Ca    7.0<L>      05 Feb 2020 04:00  Mg     2.3     02-05    TPro  5.4<L>  /  Alb  3.1<L>  /  TBili  0.3  /  DBili  x   /  AST  20  /  ALT  22  /  AlkPhos  200<H>  02-05      PT/INR - ( 04 Feb 2020 04:40 )   PT: 16.90 sec;   INR: 1.47 ratio         PTT - ( 04 Feb 2020 04:40 )  PTT:23.4 sec                                                                                     LIVER FUNCTIONS - ( 05 Feb 2020 04:00 )  Alb: 3.1 g/dL / Pro: 5.4 g/dL / ALK PHOS: 200 U/L / ALT: 22 U/L / AST: 20 U/L / GGT: x                                                  Culture - Acid Fast - Bronchial w/Smear (collected 03 Feb 2020 15:00)  Source: .Bronchial None    Culture - Fungal, Bronchial (collected 03 Feb 2020 15:00)  Source: .Bronchial None  Preliminary Report (04 Feb 2020 08:06):    Testing in progress    Culture - Bronchial (collected 03 Feb 2020 15:00)  Source: .Bronchial None  Gram Stain (04 Feb 2020 05:52):    No polymorphonuclear cells seen per low power field    No squamous epithelial cells per low power field    No organisms seen  Preliminary Report (04 Feb 2020 20:29):    No growth                                                   Mode: AC/ CMV (Assist Control/ Continuous Mandatory Ventilation)  RR (machine): 26  TV (machine): 400  FiO2: 60  PEEP: 12.5  ITime: 1  MAP: 20  PIP: 32                                      ABG - ( 05 Feb 2020 04:42 )  pH, Arterial: 7.19  pH, Blood: x     /  pCO2: 44    /  pO2: 80    / HCO3: 16    / Base Excess: -11.2 /  SaO2: 95                  MEDICATIONS  (STANDING):  ALBUTerol    90 MICROgram(s) HFA Inhaler 1 Puff(s) Inhalation every 6 hours  calcium carbonate    500 mG (Tums) Chewable 2 Tablet(s) Chew every 8 hours  cefepime   IVPB 2000 milliGRAM(s) IV Intermittent <User Schedule>  chlorhexidine 0.12% Liquid 15 milliLiter(s) Oral Mucosa every 12 hours  chlorhexidine 4% Liquid 1 Application(s) Topical <User Schedule>  cyanocobalamin 1000 MICROGram(s) Oral daily  fentaNYL   Infusion. 0.5 MICROgram(s)/kG/Hr (3.77 mL/Hr) IV Continuous <Continuous>  gabapentin 300 milliGRAM(s) Oral at bedtime  influenza   Vaccine 0.5 milliLiter(s) IntraMuscular once  insulin regular Infusion 4 Unit(s)/Hr (4 mL/Hr) IV Continuous <Continuous>  ipratropium 17 MICROgram(s) HFA Inhaler 1 Puff(s) Inhalation every 6 hours  lactated ringers Bolus 500 milliLiter(s) IV Bolus once  lactated ringers. 500 milliLiter(s) (500 mL/Hr) IV Continuous <Continuous>  lactulose Syrup 10 Gram(s) Oral two times a day  levoFLOXacin IVPB 500 milliGRAM(s) IV Intermittent every 48 hours  methylPREDNISolone sodium succinate IVPB 250 milliGRAM(s) IV Intermittent every 6 hours  midazolam Infusion 0.02 mG/kG/Hr (1.508 mL/Hr) IV Continuous <Continuous>  montelukast 10 milliGRAM(s) Oral at bedtime  multivitamin/minerals 1 Tablet(s) Oral daily  mupirocin 2% Ointment 1 Application(s) Topical two times a day  norepinephrine Infusion 0.05 MICROgram(s)/kG/Min (3.534 mL/Hr) IV Continuous <Continuous>  pantoprazole   Suspension 40 milliGRAM(s) Oral daily  senna 2 Tablet(s) Oral at bedtime  sodium bicarbonate 650 milliGRAM(s) Oral three times a day  tacrolimus 0.5 milliGRAM(s) Oral every 12 hours    MEDICATIONS  (PRN):  oxyCODONE    IR 10 milliGRAM(s) Oral every 6 hours PRN Severe Pain (7 - 10)      New X-rays reviewed:                                                                                  ECHO    CXR interpreted by me:  ET OG TLC OK>  Improved infiltrates

## 2020-02-05 NOTE — PROGRESS NOTE ADULT - ASSESSMENT
ASSESSMENT  75y/o F w/ hx of asthma, COPD not on home O2, autoimmune hepatitis on prednisone and tacrolimus, heterozygous prothrombin gene mutation with hx of PE and DVT on coumadin with recent hospitalization in January 2020 with a diagnosis of pneumonia presents for worsening SOB, hemoptysis and cough.    IMPRESSION  #Flu + AH1, ?superimposed atypical PNA (imaging not really consistent with bacterial PNA). Recent bronch 1/20 negative for PCP, Fungal, etc, previous bronch cx with yeast (likely oral contaminant) since GMS neg    2/3 Bronch   No organisms seen    Fungitell: 49 (02-01-20 @ 11:17), serum crypto Ag neg, CMV PCR undetectable    Lactate Dehydrogenase, Serum: 607 (02.03.20 @ 04:30)    Procalcitonin, Serum: 1.86:    CTA chest showing no PE but showing interval development of multifocal bilateral groundglass opacities as well as new moderate to severe bronchiectasis in the right lung,   1/13 CT b/l GGOs, compatible with intersitial edema      Serum Pro-Brain Natriuretic Peptide: 722 pg/mL (01.31.20 @ 09:25)<-- Serum Pro-Brain Natriuretic Peptide: 345 pg/mL (01.13.20 @ 12:10)    During last hospitalization: bronch cx bacterial NG, +yeast, pending ID, neg legionella, + MRSA PCR    MRSA PCR Result.: Positive (02-01-20 @ 20:40)  #Severe sepsis on admission P>90, WBC 19, RR>20, lactic acidosis Lactate, Blood: 2.9 mmol/L (01-31-20 @ 09:25)    afebrile   #Elevated Alk ph, transaminitis  #LLE wound, not infection     12/7 WCX MSSA, Kleb, bacteroides    8/2019 MRSA in a wound  #Immunosuppressed: autoimmune hepatitis on prednisone and tacrolimus    RECOMMENDATIONS  - oseltamivir 30 milliGRAM(s) daily 2/2-,  D4 would treat 7-10 days given immunocompromised   - bronch: f/u cx, fungal, AFB, PCP stain, viral cx, Toxo PCR  - levaquin 500mg q48h IV for atypical coverage  - cefepime   IVPB 2000 milliGRAM(s) IV Intermittent every 24 hours  - methylPREDNISolone sodium succinate IVPB 250    Spectra [id number]

## 2020-02-05 NOTE — PROGRESS NOTE ADULT - SUBJECTIVE AND OBJECTIVE BOX
Nephrology progress note    THIS IS AN INCOMPLETE NOTE . FULL NOTE TO FOLLOW SHORTLY    Patient is seen and examined, events over the last 24 h noted .    Allergies:  No Known Allergies    Hospital Medications:   MEDICATIONS  (STANDING):  ALBUTerol    90 MICROgram(s) HFA Inhaler 1 Puff(s) Inhalation every 6 hours  calcium carbonate    500 mG (Tums) Chewable 2 Tablet(s) Chew every 8 hours  cefepime   IVPB 2000 milliGRAM(s) IV Intermittent <User Schedule>  chlorhexidine 0.12% Liquid 15 milliLiter(s) Oral Mucosa every 12 hours  chlorhexidine 4% Liquid 1 Application(s) Topical <User Schedule>  cyanocobalamin 1000 MICROGram(s) Oral daily  fentaNYL   Infusion. 0.5 MICROgram(s)/kG/Hr (3.77 mL/Hr) IV Continuous <Continuous>  gabapentin 300 milliGRAM(s) Oral at bedtime  influenza   Vaccine 0.5 milliLiter(s) IntraMuscular once  insulin regular Infusion 4 Unit(s)/Hr (4 mL/Hr) IV Continuous <Continuous>  ipratropium 17 MICROgram(s) HFA Inhaler 1 Puff(s) Inhalation every 6 hours  lactated ringers Bolus 500 milliLiter(s) IV Bolus once  lactated ringers. 500 milliLiter(s) (500 mL/Hr) IV Continuous <Continuous>  lactulose Syrup 10 Gram(s) Oral two times a day  levoFLOXacin IVPB 500 milliGRAM(s) IV Intermittent every 48 hours  methylPREDNISolone sodium succinate IVPB 250 milliGRAM(s) IV Intermittent every 6 hours  midazolam Infusion 0.02 mG/kG/Hr (1.508 mL/Hr) IV Continuous <Continuous>  montelukast 10 milliGRAM(s) Oral at bedtime  multivitamin/minerals 1 Tablet(s) Oral daily  mupirocin 2% Ointment 1 Application(s) Topical two times a day  norepinephrine Infusion 0.05 MICROgram(s)/kG/Min (3.534 mL/Hr) IV Continuous <Continuous>  pantoprazole   Suspension 40 milliGRAM(s) Oral daily  senna 2 Tablet(s) Oral at bedtime  sodium bicarbonate 650 milliGRAM(s) Oral three times a day  tacrolimus 0.5 milliGRAM(s) Oral every 12 hours        VITALS:  T(F): 98.7 (20 @ 04:00), Max: 98.7 (20 @ 04:00)  HR: 86 (20 @ 07:30)  BP: 112/64 (20 @ 17:30)  RR: 29 (20 @ 07:00)  SpO2: 99% (20 @ 07:30)  Wt(kg): --     07:01  -   07:00  --------------------------------------------------------  IN: 3041.2 mL / OUT: 362 mL / NET: 2679.2 mL     07:01  -   07:00  --------------------------------------------------------  IN: 2774.6 mL / OUT: 1420 mL / NET: 1354.6 mL      2020 07:01  -  2020 07:00  --------------------------------------------------------  IN:    fentaNYL Infusion.: 244.6 mL    insulin regular Infusion: 96 mL    IV PiggyBack: 400 mL    Lactated Ringers IV Bolus: 1000 mL    lactated ringers.: 500 mL    midazolam Infusion: 90 mL    norepinephrine Infusion: 294 mL    sodium chloride 0.9%: 150 mL  Total IN: 2774.6 mL    OUT:    Indwelling Catheter - Urethral: 420 mL    Rectal Tube: 1000 mL  Total OUT: 1420 mL    Total NET: 1354.6 mL            PHYSICAL EXAM:  Constitutional: NAD  HEENT: anicteric sclera, oropharynx clear, MMM  Neck: No JVD  Respiratory: CTAB, no wheezes, rales or rhonchi  Cardiovascular: S1, S2, RRR  Gastrointestinal: BS+, soft, NT/ND  Extremities: No cyanosis or clubbing. No peripheral edema  :  No henley.   Skin: No rashes    LABS:      138  |  107  |  62<HH>  ----------------------------<  185<H>  4.6   |  15<L>  |  3.1<H>  Creatinine Trend: 3.1<--, 3.2<--, 3.1<--, 3.1<--, 2.4<--, 2.1<--  Ca    7.0<L>      2020 04:00  Mg     2.3         TPro  5.4<L>  /  Alb  3.1<L>  /  TBili  0.3  /  DBili      /  AST  20  /  ALT  22  /  AlkPhos  200<H>                            9.5    20.16 )-----------( 96       ( 2020 04:00 )             31.2     Blood Gas Profile - Arterial (20 @ 04:42)    pH, Arterial: 7.19    pCO2, Arterial: 44 mmHg    pO2, Arterial: 80 mmHg    HCO3, Arterial: 16 mmoL/L    Base Excess, Arterial: -11.2 mmoL/L    Oxygen Saturation, Arterial: 95 %    FIO2, Arterial: 50      Urine Studies:  Urinalysis Basic - ( 2020 17:11 )    Color: Yellow / Appearance: Slightly Turbid / S.025 / pH:   Gluc:  / Ketone: Small  / Bili: Negative / Urobili: <2 mg/dL   Blood:  / Protein: 100 mg/dL / Nitrite: Negative   Leuk Esterase: Negative / RBC: 1 /HPF / WBC 9 /HPF   Sq Epi:  / Non Sq Epi: 8 /HPF / Bacteria: Negative        RADIOLOGY & ADDITIONAL STUDIES: Nephrology progress note  Patient is seen and examined, events over the last 24 h noted .  still intubated on MV  Has diarrhea  oliguric for now     Allergies:  No Known Allergies    Hospital Medications:   MEDICATIONS  (STANDING):  ALBUTerol    90 MICROgram(s) HFA Inhaler 1 Puff(s) Inhalation every 6 hours  calcium carbonate    500 mG (Tums) Chewable 2 Tablet(s) Chew every 8 hours  cefepime   IVPB 2000 milliGRAM(s) IV Intermittent <User Schedule>  cyanocobalamin 1000 MICROGram(s) Oral daily  fentaNYL   Infusion. 0.5 MICROgram(s)/kG/Hr (3.77 mL/Hr) IV Continuous <Continuous>  gabapentin 300 milliGRAM(s) Oral at bedtime  influenza   Vaccine 0.5 milliLiter(s) IntraMuscular once  insulin regular Infusion 4 Unit(s)/Hr (4 mL/Hr) IV Continuous <Continuous>  ipratropium 17 MICROgram(s) HFA Inhaler 1 Puff(s) Inhalation every 6 hours  lactated ringers Bolus 500 milliLiter(s) IV Bolus once  lactated ringers. 500 milliLiter(s) (500 mL/Hr) IV Continuous <Continuous>  lactulose Syrup 10 Gram(s) Oral two times a day  levoFLOXacin IVPB 500 milliGRAM(s) IV Intermittent every 48 hours  methylPREDNISolone sodium succinate IVPB 250 milliGRAM(s) IV Intermittent every 6 hours  midazolam Infusion 0.02 mG/kG/Hr (1.508 mL/Hr) IV Continuous <Continuous>  montelukast 10 milliGRAM(s) Oral at bedtime  multivitamin/minerals 1 Tablet(s) Oral daily  mupirocin 2% Ointment 1 Application(s) Topical two times a day  norepinephrine Infusion 0.05 MICROgram(s)/kG/Min (3.534 mL/Hr) IV Continuous <Continuous>  pantoprazole   Suspension 40 milliGRAM(s) Oral daily  senna 2 Tablet(s) Oral at bedtime  sodium bicarbonate 650 milliGRAM(s) Oral three times a day  tacrolimus 0.5 milliGRAM(s) Oral every 12 hours        VITALS:  T(F): 98.7 (20 @ 04:00), Max: 98.7 (20 @ 04:00)  HR: 86 (20 @ 07:30)  BP: 112/64 (20 @ 17:30)  RR: 29 (20 @ 07:00)  SpO2: 99% (20 @ 07:30)       @ 07:01  -   @ 07:00  --------------------------------------------------------  IN: 3041.2 mL / OUT: 362 mL / NET: 2679.2 mL     07:  -   @ 07:00  --------------------------------------------------------  IN: 2774.6 mL / OUT: 1420 mL / NET: 1354.6 mL      2020 07:  -  2020 07:00  --------------------------------------------------------  IN:    fentaNYL Infusion.: 244.6 mL    insulin regular Infusion: 96 mL    IV PiggyBack: 400 mL    Lactated Ringers IV Bolus: 1000 mL    lactated ringers.: 500 mL    midazolam Infusion: 90 mL    norepinephrine Infusion: 294 mL    sodium chloride 0.9%: 150 mL  Total IN: 2774.6 mL    OUT:    Indwelling Catheter - Urethral: 420 mL    Rectal Tube: 1000 mL  Total OUT: 1420 mL    Total NET: 1354.6 mL            PHYSICAL EXAM:  Constitutional: intubated on MV   HEENT: anicteric sclera, oropharynx clear, MMM  Neck: No JVD  Respiratory: CTAB, no wheezes, rales or rhonchi  Cardiovascular: S1, S2, RRR  Gastrointestinal: BS+, soft, NT/ND  Extremities: No cyanosis or clubbing. trace  peripheral edema  :  positive henley .   Skin: No rashes    LABS:      138  |  107  |  62<HH>  ----------------------------<  185<H>  4.6   |  15<L>  |  3.1<H>  Creatinine Trend: 3.1<--, 3.2<--, 3.1<--, 3.1<--, 2.4<--, 2.1<--  Ca    7.0<L>      2020 04:00  Mg     2.3         TPro  5.4<L>  /  Alb  3.1<L>  /  TBili  0.3  /  DBili      /  AST  20  /  ALT  22  /  AlkPhos  200<H>                            9.5    20.16 )-----------( 96       ( 2020 04:00 )             31.2     Blood Gas Profile - Arterial (20 @ 04:42)    pH, Arterial: 7.19    pCO2, Arterial: 44 mmHg    pO2, Arterial: 80 mmHg    HCO3, Arterial: 16 mmoL/L    Base Excess, Arterial: -11.2 mmoL/L    Oxygen Saturation, Arterial: 95 %    FIO2, Arterial: 50    Tacrolimus (), Serum: 10.6: Tacrolimus testing is performed on the [employer]  by  chemiluminescent microparticle immunoassay. The therapeutic range of  tacrolimus is not clearly defined but target 12-hour trough whole blood  concentrations are 5.0 - 20.0 ng/mL early post transplant. Twenty-four  hour  trough concentrations are 33-50% less than the corresponding 12-hour  trough. ng/mL (20 @ 16:00)      Urine Studies:  Urinalysis Basic - ( 2020 17:11 )    Color: Yellow / Appearance: Slightly Turbid / S.025 / pH:   Gluc:  / Ketone: Small  / Bili: Negative / Urobili: <2 mg/dL   Blood:  / Protein: 100 mg/dL / Nitrite: Negative   Leuk Esterase: Negative / RBC: 1 /HPF / WBC 9 /HPF   Sq Epi:  / Non Sq Epi: 8 /HPF / Bacteria: Negative        RADIOLOGY & ADDITIONAL STUDIES:

## 2020-02-05 NOTE — PROGRESS NOTE ADULT - ASSESSMENT
73 y/o female with pmhx of asthma, HTN,  autoimmune hepatitis, heterozygous prothrombin gene mutation with hx of PE and DVT on coumadin admitted for SOB       Acute hypoxic resp failure with hypoxia due to influenza and HCAP, Septic shock in immunocompromised patient  - intubated and sedated  - post Bronch   - remains on IV Solu-medrol, Vancomycin, Cefepime and Levaquin  - to complete Oseltamivir 75 mg BID for 10 days due to immunocompromised state  - continue vent support with PEEP, Duoneb q6h and q4h PRN    REJI likely ATN  - holding Lasix and home BP medications  - renal following    Autoimmune hepatitis  - On prednisone 60 mg PO qd and Tacrolimus at home  - now on Solu-medrol 60 mg iv BID   - remains tacrolimus 0.5 q12h    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

## 2020-02-05 NOTE — CHART NOTE - NSCHARTNOTEFT_GEN_A_CORE
Registered Dietitian Follow-Up     Patient Profile Reviewed                           Yes [x]   No []     Nutrition History Previously Obtained        Yes []  No [x]--unable to obtain as no family at b/s      Pertinent Medical Interventions: Pt still sedated and intubated, on levophed 0.2 as of this morning. Yesterday the patient was weaned off pressors but required to return on levophed overnight. Pt noted to have diarrhea; plan to hold feeds and laxatives for now. Monitor off laxative b4 sending c.diff.    Diet order: currently NPO as TF of Osmolite 1.5 240ml Q6H is currently being held d/t diarrhea which started yesterday. Prior to this, pt was tolerating TF regimen well, w/o any issues per RN. TF regimen provides a total of 1440kcal, 60gm pro, 732ml free water.     Anthropometrics:  - Ht. 64"  - Wt. 78.4kg (2/4)--wt trending upwards likely 2/2 fluids   (2/3): 75.4kg   (2/2): 74.3kg   (2/1): 74.8kg   - %wt change  - BMI: 27.9 using lowest wt of 74.3kg   - IBW: 120#      Pertinent Lab Data: (2/5): H/H 9.5/31.2, POCT 105, BUN 62, Cr. 3.1, Gluc 185, GFR 14      Pertinent Meds: abx, lactated ringers, insulin, sodium bicarbonate, protonix, methylprednisolone, fentanyl, versed, albuterol, levophed, cyanocobalamin, MVI, tacrolimus      Physical Findings:  - Appearance: intubated/ventilated; 3+ edema to B/L arm   - GI function: diarrhea; +output 2/5   - Tubes: OGT   - Oral/Mouth cavity: NPO   - Skin: ecchymosis      Nutrition Requirements  Weight Used: lowest wt this adm: 163#, Temp: 37.1, Ve: 12.1, MAP 78; kcal needs readjusted today using new Ve     Estimated Energy Needs: 1536 kcal/day ([id number])   Estimated Protein Needs: 65-82 gm/day (1.2-1.5gm/kg IBW)--pt's age and impaired renal function considered (REJI)   Estimated Fluid Needs: per ICU team      Nutrient Intake: not meeting kcal/pro needs at this time d/t TF being held      Previous Nutrition Diagnosis: Inadequate Energy Intake (ongoing)      Nutrition Intervention: enteral nutrition    Recommendations:  1. When medically feasible, please initiate the following TF regimen: Osmolite 1.5 @ 25ml/hr, increase rate by 10ml Q4H until goal rate of 45ml/hr is reached. TF at goal rate provides a total of 1620kcal, 68gm pro, 823ml free water. Additional flushes per LIP.      Goal/Expected Outcome: Pt to meet % of estimated nutrient needs within 3 days      Indicator/Monitoring: RD to monitor diet order, energy intake, renal/glucose profiles, nutrition focused physical findings, body composition

## 2020-02-06 NOTE — PROGRESS NOTE ADULT - SUBJECTIVE AND OBJECTIVE BOX
DARNELL, DONI  74y, Female  Allergy: No Known Allergies    6d    CHIEF COMPLAINT: SOB and desaturation (06 Feb 2020 08:20)      INTERVAL EVENTS/HPI  - No acute events overnight, on levo  - CXR R side worsening opacities   - T(F): , Max: 99.2 (02-05-20 @ 12:00)  - WBC Count: 15.51 (02-06-20 @ 04:30)  WBC Count: 14.97 (02-05-20 @ 23:45)  - Creatinine, Serum: 3.6 (02-06-20 @ 04:30)  Creatinine, Serum: 3.8 (02-05-20 @ 23:45)       ROS  unable to obtain history secondary to patient's mental status and/or sedation     VITALS:  T(F): 98, Max: 99.2 (02-05-20 @ 12:00)  HR: 70  BP: --  RR: 32Vital Signs Last 24 Hrs  T(C): 36.7 (06 Feb 2020 00:00), Max: 37.3 (05 Feb 2020 12:00)  T(F): 98 (06 Feb 2020 00:00), Max: 99.2 (05 Feb 2020 12:00)  HR: 70 (06 Feb 2020 07:00) (70 - 96)  BP: --  BP(mean): --  RR: 32 (06 Feb 2020 07:00) (23 - 43)  SpO2: 99% (06 Feb 2020 07:00) (92% - 100%)    PHYSICAL EXAM:  Gen: intubated  HEENT: Normocephalic, atraumatic  Neck: supple, no lymphadenopathy  CV: Regular rate & regular rhythm  Lungs: decreased BS at bases, no fremitus  Abdomen: Soft, BS present  Ext: Warm, well perfused, anasarca  Neuro: sedated  Skin: no rash, no erythema, ecchymoses, pustular rash chest   Lines: no phlebitis    FH: Non-contributory  Social Hx: Non-contributory    TESTS & MEASUREMENTS:                        8.3    15.51 )-----------( 68       ( 06 Feb 2020 04:30 )             26.3     02-06    139  |  104  |  76<HH>  ----------------------------<  214<H>  5.2<H>   |  20  |  3.6<H>    Ca    7.1<L>      06 Feb 2020 04:30  Phos  5.3     02-06  Mg     2.4     02-06    TPro  4.9<L>  /  Alb  2.7<L>  /  TBili  0.5  /  DBili  x   /  AST  16  /  ALT  18  /  AlkPhos  157<H>  02-06    eGFR if Non African American: 12 mL/min/1.73M2 (02-06-20 @ 04:30)  eGFR if African American: 14 mL/min/1.73M2 (02-06-20 @ 04:30)  eGFR if Non African American: 11 mL/min/1.73M2 (02-05-20 @ 23:45)  eGFR if : 13 mL/min/1.73M2 (02-05-20 @ 23:45)  eGFR if Non African American: 11 mL/min/1.73M2 (02-05-20 @ 15:50)  eGFR if : 13 mL/min/1.73M2 (02-05-20 @ 15:50)    LIVER FUNCTIONS - ( 06 Feb 2020 04:30 )  Alb: 2.7 g/dL / Pro: 4.9 g/dL / ALK PHOS: 157 U/L / ALT: 18 U/L / AST: 16 U/L / GGT: x               Culture - Acid Fast - Bronchial w/Smear (collected 02-03-20 @ 15:00)  Source: .Bronchial None  Preliminary Report (02-05-20 @ 15:04):    Culture is being performed.    Culture - Fungal, Bronchial (collected 02-03-20 @ 15:00)  Source: .Bronchial None  Preliminary Report (02-04-20 @ 08:06):    Testing in progress    Culture - Bronchial (collected 02-03-20 @ 15:00)  Source: .Bronchial None  Gram Stain (02-04-20 @ 05:52):    No polymorphonuclear cells seen per low power field    No squamous epithelial cells per low power field    No organisms seen  Final Report (02-05-20 @ 19:32):    Normal Respiratory Nikki present    Culture - Blood (collected 01-31-20 @ 09:50)  Source: .Blood Blood  Final Report (02-05-20 @ 23:01):    No growth at 5 days.    Culture - Blood (collected 01-31-20 @ 09:50)  Source: .Blood Blood  Final Report (02-05-20 @ 23:01):    No growth at 5 days.            INFECTIOUS DISEASES TESTING  Streptococcus Pneumoniae Ag Urine: Negative (02-02-20 @ 11:00)  MRSA PCR Result.: Positive (02-01-20 @ 20:40)  Fungitell: 49 (02-01-20 @ 11:17)  Rapid RVP Result: Detected (01-31-20 @ 16:24)  Legionella Antigen, Urine: Negative (01-31-20 @ 15:36)  Streptococcus Pneumoniae Ag Urine: Negative (01-31-20 @ 15:36)  Legionella Antigen, Urine: Negative (01-20-20 @ 02:50)  MRSA PCR Result.: Positive (01-14-20 @ 13:59)  MRSA PCR Result.: Negative (08-13-19 @ 08:46)      RADIOLOGY & ADDITIONAL TESTS:  I have personally reviewed the last available Chest xray  CXR      CT      CARDIOLOGY TESTING  12 Lead ECG:   Ventricular Rate 118 BPM    Atrial Rate 118 BPM    P-R Interval 150 ms    QRS Duration 80 ms    Q-T Interval 326 ms    QTC Calculation(Bezet) 456 ms    P Axis 41 degrees    R Axis -10 degrees    T Axis 11 degrees    Diagnosis Line Sinus tachycardia  Possible Left atrial enlargement  Inferior infarct , age undetermined  Abnormal ECG    Confirmed by MOHSEN SONG MD [id number]) on 1/31/2020 12:38:55 PM (01-31-20 @ 11:06)      MEDICATIONS  ALBUTerol    90 MICROgram(s) HFA Inhaler 1  calcium carbonate    500 mG (Tums) Chewable 2  cefepime   IVPB 2000  chlorhexidine 0.12% Liquid 15  chlorhexidine 4% Liquid 1  cyanocobalamin 1000  fentaNYL   Infusion. 0.5  gabapentin 300  influenza   Vaccine 0.5  insulin regular Infusion 4  ipratropium 17 MICROgram(s) HFA Inhaler 1  lactated ringers. 500  levoFLOXacin IVPB 500  methylPREDNISolone sodium succinate Injectable 80  midazolam Infusion 0.02  montelukast 10  multivitamin/minerals 1  mupirocin 2% Ointment 1  norepinephrine Infusion 0.05  oseltamivir 30  pantoprazole   Suspension 40  sodium bicarbonate  Infusion 0.138  tacrolimus 0.5      WEIGHT  Weight (kg): 81.5 (02-03-20 @ 12:15)    ANTIBIOTICS:  cefepime   IVPB 2000 milliGRAM(s) IV Intermittent <User Schedule>  levoFLOXacin IVPB 500 milliGRAM(s) IV Intermittent every 48 hours  oseltamivir 30 milliGRAM(s) Oral <User Schedule>      All available historical records have been reviewed

## 2020-02-06 NOTE — PROGRESS NOTE ADULT - SUBJECTIVE AND OBJECTIVE BOX
Patient is a 74y old  Female who presents with a chief complaint of SOB and desaturation (06 Feb 2020 08:22)        Over Night Events:  Remains critically ill on MV.  On Levophed        ROS:     CONSTITUTIONAL:   no fever   no chills.  no weight gain   no weight loss    EYES:   no discharge,   no pain  no redness,   no visual changes.    ENT:   Ears: no ear pain and no hearing problems.  Nose: no nasal congestion and no nasal drainage.  Mouth/Throat: no dysphagia,  no hoarseness and no throat pain.  Neck: no lumps, no pain, no stiffness and no swollen glands.     CARDIOVASCULAR:   no chest pain,   no swelling  no palpitaions  no syncope    RESPIRATORY:  Per HPI    GASTROINTESTINAL:   no abdominal pain,   no constipation,   no diarrhea,   no vomiting.    GENITOURINARY:  no dysuria,   no frequency,   no urgency  no hematuria.    MUSCULOSKELETAL:   no back pain,   no musculoskeletal pain,  no weakness.    SKIN:   no jaundice,   no lesions,   no pruritis,   no rashes.    NEURO:   Sedated .    PSYCHIATRIC:   no known mental health issues  no anxiety  no depression    ALLERGIC/IMMUNOLOGIC:   No active allergic or immunologic issues        PHYSICAL EXAM    ICU Vital Signs Last 24 Hrs  T(C): 35.9 (06 Feb 2020 08:00), Max: 37.3 (05 Feb 2020 12:00)  T(F): 96.7 (06 Feb 2020 08:00), Max: 99.2 (05 Feb 2020 12:00)  HR: 72 (06 Feb 2020 08:30) (68 - 96)  BP: --  BP(mean): --  ABP: 122/54 (06 Feb 2020 08:30) (122/54 - 142/60)  ABP(mean): 78 (06 Feb 2020 08:30) (76 - 162)  RR: 28 (06 Feb 2020 08:30) (23 - 43)  SpO2: 94% (06 Feb 2020 08:30) (92% - 100%)      CONSTITUTIONAL:   Ill appearing.  Well nourished.  NAD    ENT:   Airway patent,   Mouth with normal mucosa.   No thrush    EYES:   Pupils equal,   Round and reactive to light.    CARDIAC:   Normal rate,   Regular rhythm.     edema      Vascular:  Normal systolic impulse  No Carotid bruits    RESPIRATORY:   No wheezing  Bilateral BS  Normal chest expansion  Not tachypneic,  No use of accessory muscles    GASTROINTESTINAL:  Abdomen soft,   Non-tender,   No guarding,   + BS    GENITOURINARY  normal genitalia for sex   edema    MUSCULOSKELETAL:   Range of motion is not limited,  No muscle or joint tenderness  No clubbing, cyanosis    NEUROLOGICAL:   Sedated   No motor  deficits.  pertinent DTRs normal    SKIN:   Skin normal color for race,   Warm and dry  No evidence of rash.    PSYCHIATRIC:   Normal mood and affect.   No apparent risk to self or others.    HEME LYMPH:   No cervical  lymphadenopathy.  no inguinal lymphadenopathy      02-05-20 @ 07:01  -  02-06-20 @ 07:00  --------------------------------------------------------  IN:    fentaNYL Infusion.: 391.2 mL    insulin regular Infusion: 58 mL    midazolam Infusion: 144 mL    norepinephrine Infusion: 221.6 mL    sodium bicarbonate  Infusion: 150 mL    sodium bicarbonate  Infusion: 500 mL    sodium bicarbonate  Infusion: 825 mL  Total IN: 2289.8 mL    OUT:    Indwelling Catheter - Urethral: 280 mL    Rectal Tube: 400 mL  Total OUT: 680 mL    Total NET: 1609.8 mL          LABS:                            8.3    15.51 )-----------( 68       ( 06 Feb 2020 04:30 )             26.3                                               02-06    139  |  104  |  76<HH>  ----------------------------<  214<H>  5.2<H>   |  20  |  3.6<H>    Ca    7.1<L>      06 Feb 2020 04:30  Phos  5.3     02-06  Mg     2.4     02-06    TPro  4.9<L>  /  Alb  2.7<L>  /  TBili  0.5  /  DBili  x   /  AST  16  /  ALT  18  /  AlkPhos  157<H>  02-06      PT/INR - ( 06 Feb 2020 04:30 )   PT: 20.80 sec;   INR: 1.81 ratio         PTT - ( 06 Feb 2020 04:30 )  PTT:26.6 sec                                                                                     LIVER FUNCTIONS - ( 06 Feb 2020 04:30 )  Alb: 2.7 g/dL / Pro: 4.9 g/dL / ALK PHOS: 157 U/L / ALT: 18 U/L / AST: 16 U/L / GGT: x                                                  Culture - Acid Fast - Bronchial w/Smear (collected 03 Feb 2020 15:00)  Source: .Bronchial None  Preliminary Report (05 Feb 2020 15:04):    Culture is being performed.    Culture - Fungal, Bronchial (collected 03 Feb 2020 15:00)  Source: .Bronchial None  Preliminary Report (04 Feb 2020 08:06):    Testing in progress    Culture - Bronchial (collected 03 Feb 2020 15:00)  Source: .Bronchial None  Gram Stain (04 Feb 2020 05:52):    No polymorphonuclear cells seen per low power field    No squamous epithelial cells per low power field    No organisms seen  Final Report (05 Feb 2020 19:32):    Normal Respiratory Nikki present                                                   Mode: AC/ CMV (Assist Control/ Continuous Mandatory Ventilation)  RR (machine): 26  TV (machine): 400  FiO2: 50  PEEP: 12.5  ITime: 1  MAP: 20  PIP: 31                                      ABG - ( 06 Feb 2020 05:06 )  pH, Arterial: 7.29  pH, Blood: x     /  pCO2: 44    /  pO2: 72    / HCO3: 21    / Base Excess: -5.1  /  SaO2: 95    Lac 0.9              MEDICATIONS  (STANDING):  ALBUTerol    90 MICROgram(s) HFA Inhaler 1 Puff(s) Inhalation every 6 hours  calcium carbonate    500 mG (Tums) Chewable 2 Tablet(s) Chew every 8 hours  cefepime   IVPB 2000 milliGRAM(s) IV Intermittent <User Schedule>  chlorhexidine 0.12% Liquid 15 milliLiter(s) Oral Mucosa every 12 hours  chlorhexidine 4% Liquid 1 Application(s) Topical <User Schedule>  cyanocobalamin 1000 MICROGram(s) Oral daily  fentaNYL   Infusion. 0.5 MICROgram(s)/kG/Hr (3.77 mL/Hr) IV Continuous <Continuous>  gabapentin 300 milliGRAM(s) Oral at bedtime  influenza   Vaccine 0.5 milliLiter(s) IntraMuscular once  insulin regular Infusion 4 Unit(s)/Hr (4 mL/Hr) IV Continuous <Continuous>  ipratropium 17 MICROgram(s) HFA Inhaler 1 Puff(s) Inhalation every 6 hours  lactated ringers. 500 milliLiter(s) (500 mL/Hr) IV Continuous <Continuous>  levoFLOXacin IVPB 500 milliGRAM(s) IV Intermittent every 48 hours  methylPREDNISolone sodium succinate Injectable 80 milliGRAM(s) IV Push every 8 hours  midazolam Infusion 0.02 mG/kG/Hr (1.508 mL/Hr) IV Continuous <Continuous>  montelukast 10 milliGRAM(s) Oral at bedtime  multivitamin/minerals 1 Tablet(s) Oral daily  mupirocin 2% Ointment 1 Application(s) Topical two times a day  norepinephrine Infusion 0.05 MICROgram(s)/kG/Min (3.534 mL/Hr) IV Continuous <Continuous>  oseltamivir 30 milliGRAM(s) Oral <User Schedule>  pantoprazole   Suspension 40 milliGRAM(s) Oral daily  sodium bicarbonate  Infusion 0.138 mEq/kG/Hr (75 mL/Hr) IV Continuous <Continuous>  tacrolimus 0.5 milliGRAM(s) Oral every 12 hours    MEDICATIONS  (PRN):      New X-rays reviewed:                                                                                  ECHO    CXR interpreted by me:  ET OG OK>  bilateral infiltrate

## 2020-02-06 NOTE — PROGRESS NOTE ADULT - ASSESSMENT
75 y/o F with REJI in hospital for SOB, hemoptysis, cough.  PMH asthma, COPD not on home O2, autoimmune hepatitis on prednisone and tacrolimus, heterozygous prothrombin gene mutation with hx of PE and DVT on coumadin, recent hospitalization for pneumonia   # REJI/ ATN in nature/ vanco toxicity?  #  UO noted   # creatinine trending down   # remains on pressors    #ID notes appreciated follow recs   # on FK continue for now trough noted doubt true trough on low dose/ please repeat trough , goal around 5   # ph at goal, corrected calcium around 7.8, on Ca carbonate , check PTH levels   # start sodium bicarbonate 650 q 8   # no acute indication for RRT  # will follow  # prognosis guarded

## 2020-02-06 NOTE — PROGRESS NOTE ADULT - SUBJECTIVE AND OBJECTIVE BOX
Patient is a 74y old  Female who presents with a chief complaint of SOB and desaturation (06 Feb 2020 08:59)      OVERNIGHT EVENTS: remained stable, intubated, sedated and on Levophed, no fever     SUBJECTIVE / INTERVAL HPI: Patient seen and examined at bedside.     VITAL SIGNS:  Vital Signs Last 24 Hrs  T(C): 35.9 (06 Feb 2020 08:00), Max: 37.3 (05 Feb 2020 12:00)  T(F): 96.7 (06 Feb 2020 08:00), Max: 99.2 (05 Feb 2020 12:00)  HR: 72 (06 Feb 2020 09:30) (68 - 96)  BP: --  BP(mean): --  RR: 26 (06 Feb 2020 09:30) (23 - 38)  SpO2: 100% (06 Feb 2020 09:30) (92% - 100%)    PHYSICAL EXAM:    General: WDWN  HEENT: NC/AT; PERRL, clear conjunctiva  Neck: supple  Cardiovascular: +S1/S2; RRR  Respiratory: CTA b/l; no W/R/R  Gastrointestinal: soft, NT/ND; +BSx4  Extremities: WWP; 2+ peripheral pulses; no edema   Neurological: sedated; no focal deficits    MEDICATIONS:  MEDICATIONS  (STANDING):  ALBUTerol    90 MICROgram(s) HFA Inhaler 1 Puff(s) Inhalation every 6 hours  calcium carbonate    500 mG (Tums) Chewable 2 Tablet(s) Chew every 8 hours  cefepime   IVPB 2000 milliGRAM(s) IV Intermittent <User Schedule>  chlorhexidine 0.12% Liquid 15 milliLiter(s) Oral Mucosa every 12 hours  chlorhexidine 4% Liquid 1 Application(s) Topical <User Schedule>  cyanocobalamin 1000 MICROGram(s) Oral daily  fentaNYL   Infusion. 0.5 MICROgram(s)/kG/Hr (3.77 mL/Hr) IV Continuous <Continuous>  gabapentin 300 milliGRAM(s) Oral at bedtime  influenza   Vaccine 0.5 milliLiter(s) IntraMuscular once  insulin regular Infusion 4 Unit(s)/Hr (4 mL/Hr) IV Continuous <Continuous>  ipratropium 17 MICROgram(s) HFA Inhaler 1 Puff(s) Inhalation every 6 hours  lactated ringers. 500 milliLiter(s) (500 mL/Hr) IV Continuous <Continuous>  levoFLOXacin IVPB 500 milliGRAM(s) IV Intermittent every 48 hours  methylPREDNISolone sodium succinate Injectable 80 milliGRAM(s) IV Push every 8 hours  midazolam Infusion 0.02 mG/kG/Hr (1.508 mL/Hr) IV Continuous <Continuous>  montelukast 10 milliGRAM(s) Oral at bedtime  multivitamin/minerals 1 Tablet(s) Oral daily  mupirocin 2% Ointment 1 Application(s) Topical two times a day  norepinephrine Infusion 0.05 MICROgram(s)/kG/Min (3.534 mL/Hr) IV Continuous <Continuous>  oseltamivir 30 milliGRAM(s) Oral <User Schedule>  pantoprazole   Suspension 40 milliGRAM(s) Oral daily  sodium bicarbonate  Infusion 0.092 mEq/kG/Hr (50 mL/Hr) IV Continuous <Continuous>  tacrolimus 0.5 milliGRAM(s) Oral every 12 hours    MEDICATIONS  (PRN):      ALLERGIES:  Allergies    No Known Allergies    Intolerances        LABS:                        8.3    15.51 )-----------( 68       ( 06 Feb 2020 04:30 )             26.3     02-06    139  |  104  |  76<HH>  ----------------------------<  214<H>  5.2<H>   |  20  |  3.6<H>    Ca    7.1<L>      06 Feb 2020 04:30  Phos  5.3     02-06  Mg     2.4     02-06    TPro  4.9<L>  /  Alb  2.7<L>  /  TBili  0.5  /  DBili  x   /  AST  16  /  ALT  18  /  AlkPhos  157<H>  02-06    PT/INR - ( 06 Feb 2020 04:30 )   PT: 20.80 sec;   INR: 1.81 ratio         PTT - ( 06 Feb 2020 04:30 )  PTT:26.6 sec    CAPILLARY BLOOD GLUCOSE  169 (05 Feb 2020 16:09)      POCT Blood Glucose.: 266 mg/dL (06 Feb 2020 05:59)      < from: Xray Chest 1 View- PORTABLE-Routine (02.06.20 @ 04:48) >  IMPRESSION:    No change since the prior exam.    < end of copied text > Patient is a 74y old  Female who presents with a chief complaint of SOB and desaturation (06 Feb 2020 08:59)      OVERNIGHT EVENTS: remained stable, intubated, sedated and on Levophed, no fever     SUBJECTIVE / INTERVAL HPI: Patient seen and examined at bedside.     VITAL SIGNS:  Vital Signs Last 24 Hrs  T(C): 35.9 (06 Feb 2020 08:00), Max: 37.3 (05 Feb 2020 12:00)  T(F): 96.7 (06 Feb 2020 08:00), Max: 99.2 (05 Feb 2020 12:00)  HR: 72 (06 Feb 2020 09:30) (68 - 96)  BP: --  BP(mean): --  RR: 26 (06 Feb 2020 09:30) (23 - 38)  SpO2: 100% (06 Feb 2020 09:30) (92% - 100%)    PHYSICAL EXAM:    General: intubated and sedated  HEENT: NC/AT; PERRL, clear conjunctiva  Neck: supple  Cardiovascular: +S1/S2; RRR  Respiratory: CTA b/l; no W/R/R  Gastrointestinal: soft, NT/ND; +BSx4  Extremities: WWP; 2+ peripheral pulses; no edema   Neurological: sedated; no focal deficits    MEDICATIONS:  MEDICATIONS  (STANDING):  ALBUTerol    90 MICROgram(s) HFA Inhaler 1 Puff(s) Inhalation every 6 hours  calcium carbonate    500 mG (Tums) Chewable 2 Tablet(s) Chew every 8 hours  cefepime   IVPB 2000 milliGRAM(s) IV Intermittent <User Schedule>  chlorhexidine 0.12% Liquid 15 milliLiter(s) Oral Mucosa every 12 hours  chlorhexidine 4% Liquid 1 Application(s) Topical <User Schedule>  cyanocobalamin 1000 MICROGram(s) Oral daily  fentaNYL   Infusion. 0.5 MICROgram(s)/kG/Hr (3.77 mL/Hr) IV Continuous <Continuous>  gabapentin 300 milliGRAM(s) Oral at bedtime  influenza   Vaccine 0.5 milliLiter(s) IntraMuscular once  insulin regular Infusion 4 Unit(s)/Hr (4 mL/Hr) IV Continuous <Continuous>  ipratropium 17 MICROgram(s) HFA Inhaler 1 Puff(s) Inhalation every 6 hours  lactated ringers. 500 milliLiter(s) (500 mL/Hr) IV Continuous <Continuous>  levoFLOXacin IVPB 500 milliGRAM(s) IV Intermittent every 48 hours  methylPREDNISolone sodium succinate Injectable 80 milliGRAM(s) IV Push every 8 hours  midazolam Infusion 0.02 mG/kG/Hr (1.508 mL/Hr) IV Continuous <Continuous>  montelukast 10 milliGRAM(s) Oral at bedtime  multivitamin/minerals 1 Tablet(s) Oral daily  mupirocin 2% Ointment 1 Application(s) Topical two times a day  norepinephrine Infusion 0.05 MICROgram(s)/kG/Min (3.534 mL/Hr) IV Continuous <Continuous>  oseltamivir 30 milliGRAM(s) Oral <User Schedule>  pantoprazole   Suspension 40 milliGRAM(s) Oral daily  sodium bicarbonate  Infusion 0.092 mEq/kG/Hr (50 mL/Hr) IV Continuous <Continuous>  tacrolimus 0.5 milliGRAM(s) Oral every 12 hours    MEDICATIONS  (PRN):      ALLERGIES:  Allergies    No Known Allergies    Intolerances        LABS:                        8.3    15.51 )-----------( 68       ( 06 Feb 2020 04:30 )             26.3     02-06    139  |  104  |  76<HH>  ----------------------------<  214<H>  5.2<H>   |  20  |  3.6<H>    Ca    7.1<L>      06 Feb 2020 04:30  Phos  5.3     02-06  Mg     2.4     02-06    TPro  4.9<L>  /  Alb  2.7<L>  /  TBili  0.5  /  DBili  x   /  AST  16  /  ALT  18  /  AlkPhos  157<H>  02-06    PT/INR - ( 06 Feb 2020 04:30 )   PT: 20.80 sec;   INR: 1.81 ratio         PTT - ( 06 Feb 2020 04:30 )  PTT:26.6 sec    CAPILLARY BLOOD GLUCOSE  169 (05 Feb 2020 16:09)  Tacrolimus (), Serum: 11.3: Tacrolimus testing is performed on the [employer]  by  chemiluminescent microparticle immunoassay. The therapeutic range of  tacrolimus is not clearly defined but target 12-hour trough whole blood  concentrations are 5.0 - 20.0 ng/mL early post transplant. Twenty-four  hour  trough concentrations are 33-50% less than the corresponding 12-hour  trough. ng/mL (02.05.20 @ 15:50)      Culture Results:   Normal Respiratory Nikki present (02.03.20 @ 15:00)    Culture Results:   Few Yeast (01.16.20 @ 07:45)    Cytopathology - Non Gyn Report:   ACCESSION No:  [id number]    DONI PATRICK                           1        Cytopathology Report            Specimen(s) Submitted  BAL ( RML )      Clinical History  Pneumonia      Gross Description  The specimen is received fresh labeled with the patient's name  and consists of 25 ml of red fluid. One monolayer slide (Thin  Prep) is prepared and stained with Papanicolaou stain.      Final Diagnosis  BRONCHOALVEOLAR LAVAGE    ATYPICAL FINDINGS.  Rare atypical cells, few ciliated bronchial cells, alveolar  macrophages and inflammatory cells, mainly neutrophils.    Screened by: Yesica AGUILAR(ASCP)  Verified by: Mary Jane Canada M.D.  (Electronic Signature)  Reported on: 02/04/20 17:51 EST, [facility], [place] [address]  Phone: [phone]   Fax: [phone]  Cytology technical processing performed at [facility],  [address] Floor, [place] [address]  _________________________________________________________________ (02.03.20 @ 09:20)          POCT Blood Glucose.: 266 mg/dL (06 Feb 2020 05:59)      < from: Xray Chest 1 View- PORTABLE-Routine (02.06.20 @ 04:48) >  IMPRESSION:    No change since the prior exam.    < end of copied text >

## 2020-02-06 NOTE — PROGRESS NOTE ADULT - ASSESSMENT
A 74y female with PMH of asthma, COPD not on home O2, autoimmune hepatitis on prednisone and tacrolimus, heterozygous prothrombin gene mutation with hx of PE and DVT on coumadin presents to the hospital complaining of cough, hemoptysis, SOB and desaturation to 70s.     # Acute hypoxic resp failure due to flu +ve, possible bacterial infection and alveolar hemorrhage   - intubated and sedated, s/p Bronch showing some bleeding  - Solu-medrol 80mg iV q6  - s/p DDAVP q8h, 3 doses   - C/w cefepime and Levaquin D5/7 - Vanc d/c kidneys toxicity  - oseltamivir 30 mg BID for D5/10   - C/w Duoneb   - F/up autoimmune panel ( TEE, RF, GBM, ANCA, IgA endomysial ab, APS panel)  - Bronch results - neg culture, neg fungal, cytology positive for inflammatory cells, no organisms    #REJI now oliguric likely ATN with Vanc toxicity   - ABG and UO noted  - Cr trends up  - Renal dosing abs  - f/u FK level  - Vanc d/mark today  -give 500cc of Bicarb bolus, f/u UO and ABG    #Diarrhea:  -hold laxative  -hold feeding for now  -monitor off laxative b4 sending c.diff    #Immunosuppressed: autoimmune hepatitis on prednisone and tacrolimus  - continue with steroid and tacrolimus    # Autoimmune hepatitis  - prednisone 60 mg PO qd at home now on methylprednisolone 250mg iv Q6H  - C/w tacrolimus 0.5 q12h, level was ok, ok to keep it, for now    # Recent CMV infection in January 2020  - CMV PCR was negative    # HTN  - now hypotension   - was on amlodipine 10 mg qd and lopressor 50 mg PO qd holding now due to hypotension  - on levophed 0.2 as of this morning  - Monitor BPs    # heterozygous prothrombin gene mutation with hx of PE and DVT on coumadin  - holding coumadin for now due to suspected alveolar hemorrhage   - monitor coags    #0.5 cm of GB polyp  -needs f/u US in 12 months     # Hx of asthma  - C/w montelukast qd    # GI PPx: Protonix 40 mg PO qd  # DVT PPx: sequentials  # Activity: bedrest  # Dispo: ICU for now  # Code Status: FULL A 74y female with PMH of asthma, COPD not on home O2, autoimmune hepatitis on prednisone and tacrolimus, heterozygous prothrombin gene mutation with hx of PE and DVT on coumadin presents to the hospital complaining of cough, hemoptysis, SOB and desaturation to 70s.     # Acute hypoxic resp failure due to flu +ve, possible bacterial infection and alveolar hemorrhage   - intubated and sedated, s/p Bronch showing some bleeding  - Solu-medrol 80mg iV q6  - s/p DDAVP q8h, 3 doses   - C/w cefepime and Levaquin D6/7 - off Vanc >> kidneys toxicity  - oseltamivir 30 mg BID for D6/10   - C/w Duoneb   - F/up autoimmune panel ( TEE, RF, GBM, ANCA, IgA endomysial ab, APS panel)  - Bronch results - neg culture, neg fungal, cytology positive for inflammatory cells, no organisms    #REJI oliguric likely ATN with Vanc toxicity   - acidosis resolving still oliguric UO  -Cr 3.6 from 3.8, no indication for RRT now  - Renal dosing abs  - f/u FK level  - Vanc d/mark   -c/w Bicarb drip rate 50cc/hr    #Diarrhea: improving  -hold laxative  -resumed feeding  -monitor off laxative 48 hrs b4 sending c.diff    #Immunosuppressed: autoimmune hepatitis on prednisone and tacrolimus  - continue with steroid and tacrolimus    # Autoimmune hepatitis  - prednisone 60 mg PO qd at home now on methylprednisolone 250mg iv Q6H  - C/w tacrolimus 0.5 q12h, level was ok, ok to keep it for now    # Recent CMV infection in January 2020  - CMV PCR was negative    # HTN  - now hypotension   - was on amlodipine 10 mg qd and lopressor 50 mg PO qd holding now due to hypotension  - wean off levophed  - Monitor BPs    # heterozygous prothrombin gene mutation with hx of PE and DVT on coumadin  - holding coumadin for now due to suspected alveolar hemorrhage   - monitor coags    #0.5 cm of GB polyp  -needs f/u US in 12 months     # Hx of asthma  - C/w montelukast qd    # GI PPx: Protonix 40 mg PO qd  # DVT PPx: sequentials  # Activity: bedrest  # Dispo: ICU for now  # Code Status: FULL A 74y female with PMH of asthma, COPD not on home O2, autoimmune hepatitis on prednisone and tacrolimus, heterozygous prothrombin gene mutation with hx of PE and DVT on coumadin presents to the hospital complaining of cough, hemoptysis, SOB and desaturation to 70s.     # Acute hypoxic resp failure due to flu +ve, possible bacterial infection and alveolar hemorrhage   - intubated and sedated, s/p Bronch showing some bleeding  - wean off sedation  - Solu-medrol 80mg iV q6  - s/p DDAVP q8h, 3 doses   - C/w cefepime and Levaquin D6/7 - off Vanc >> kidneys toxicity  - oseltamivir 30 mg BID for D6/10   - C/w Duoneb   - F/up autoimmune panel ( TEE, RF, GBM, ANCA, IgA endomysial ab, APS panel)  - Bronch results - neg culture, neg fungal, cytology positive for inflammatory cells, no organisms    #REJI oliguric likely ATN with Vanc toxicity   - acidosis resolving still oliguric UO  -Cr 3.6 from 3.8, no indication for RRT now  - Renal dosing abs  - f/u FK level  - Vanc d/mark   -c/w Bicarb drip rate 50cc/hr    #Diarrhea: improving  -hold laxative  -resumed Tube feeding  -monitor off laxative 48 hrs b4 sending c.diff    #Immunosuppressed: autoimmune hepatitis on prednisone and tacrolimus  - continue with steroid and tacrolimus    # Autoimmune hepatitis  - prednisone 60 mg PO qd at home now on methylprednisolone 250mg iv Q6H  - C/w tacrolimus 0.5 q12h, level was ok, ok to keep it for now    # Recent CMV infection in January 2020  - CMV PCR was negative    # HTN  - now hypotension   - was on amlodipine 10 mg qd and lopressor 50 mg PO qd holding now due to hypotension  - wean off levophed  - Monitor BPs    # heterozygous prothrombin gene mutation with hx of PE and DVT on coumadin  - holding coumadin for now due to suspected alveolar hemorrhage   - monitor coags    #0.5 cm of GB polyp  -needs f/u US in 12 months     # Hx of asthma  - C/w montelukast qd    # GI PPx: Protonix 40 mg PO qd  # DVT PPx: sequentials  # Activity: bedrest  # Dispo: ICU for now  # Code Status: FULL

## 2020-02-06 NOTE — PROGRESS NOTE ADULT - ASSESSMENT
ASSESSMENT  73y/o F w/ hx of asthma, COPD not on home O2, autoimmune hepatitis on prednisone and tacrolimus, heterozygous prothrombin gene mutation with hx of PE and DVT on coumadin with recent hospitalization in January 2020 with a diagnosis of pneumonia presents for worsening SOB, hemoptysis and cough.    IMPRESSION  #Flu + AH1, ?superimposed atypical PNA (imaging not really consistent with bacterial PNA). Recent bronch 1/20 negative for PCP, Fungal, etc, previous bronch cx with yeast (likely oral contaminant) since GMS neg    2/3 Bronch   No organisms seen, 2/3 cytopath Rare atypical cells, few ciliated bronchial cells, alveolar macrophages and inflammatory cells, mainly neutrophils.    Fungitell: 49 (02-01-20 @ 11:17), Strep urine Ag neg, serum crypto Ag neg, CMV PCR undetectable, AFB neg    Quantiferon indeterminate    Lactate Dehydrogenase, Serum: 607 (02.03.20 @ 04:30)    Procalcitonin, Serum: 1.86:    CTA chest showing no PE but showing interval development of multifocal bilateral groundglass opacities as well as new moderate to severe bronchiectasis in the right lung,   1/13 CT b/l GGOs, compatible with intersitial edema      Serum Pro-Brain Natriuretic Peptide: 722 pg/mL (01.31.20 @ 09:25)<-- Serum Pro-Brain Natriuretic Peptide: 345 pg/mL (01.13.20 @ 12:10)    During last hospitalization: bronch cx bacterial NG, +yeast, pending ID, neg legionella, + MRSA PCR    MRSA PCR Result.: Positive (02-01-20 @ 20:40)  #Severe sepsis on admission P>90, WBC 19, RR>20, lactic acidosis Lactate, Blood: 2.9 mmol/L (01-31-20 @ 09:25)    afebrile   #Elevated Alk ph, transaminitis  #LLE wound, not infection     12/7 WCX MSSA, Kleb, bacteroides    8/2019 MRSA in a wound  #Immunosuppressed: autoimmune hepatitis on prednisone and tacrolimus    RECOMMENDATIONS  - oseltamivir 30 milliGRAM(s) daily 2/2-,  D4 would treat 7-10 days given immunocompromised   - bronch: f/u cx, fungal, AFB, PCP stain, viral cx, Toxo PCR  - levaquin 500mg q48h IV for atypical coverage 2/2- (D5)  - cefepime   IVPB 2000 milliGRAM(s) IV Intermittent every 24 hours 2/2-  - on methylPREDNISolone sodium succinate Injectable 80    [id number]

## 2020-02-06 NOTE — PROGRESS NOTE ADULT - SUBJECTIVE AND OBJECTIVE BOX
seen and examined   intubated/ventilated       PAST HISTORY  --------------------------------------------------------------------------------  No significant changes to PMH, PSH, FHx, SHx, unless otherwise noted    ALLERGIES & MEDICATIONS  --------------------------------------------------------------------------------  Allergies    No Known Allergies    Intolerances      Standing Inpatient Medications  ALBUTerol    90 MICROgram(s) HFA Inhaler 1 Puff(s) Inhalation every 6 hours  calcium carbonate    500 mG (Tums) Chewable 2 Tablet(s) Chew every 8 hours  cefepime   IVPB 2000 milliGRAM(s) IV Intermittent <User Schedule>  chlorhexidine 0.12% Liquid 15 milliLiter(s) Oral Mucosa every 12 hours  chlorhexidine 4% Liquid 1 Application(s) Topical <User Schedule>  cyanocobalamin 1000 MICROGram(s) Oral daily  fentaNYL   Infusion. 0.5 MICROgram(s)/kG/Hr IV Continuous <Continuous>  gabapentin 300 milliGRAM(s) Oral at bedtime  influenza   Vaccine 0.5 milliLiter(s) IntraMuscular once  insulin regular Infusion 4 Unit(s)/Hr IV Continuous <Continuous>  ipratropium 17 MICROgram(s) HFA Inhaler 1 Puff(s) Inhalation every 6 hours  lactated ringers. 500 milliLiter(s) IV Continuous <Continuous>  levoFLOXacin IVPB 500 milliGRAM(s) IV Intermittent every 48 hours  methylPREDNISolone sodium succinate Injectable 80 milliGRAM(s) IV Push every 8 hours  midazolam Infusion 0.02 mG/kG/Hr IV Continuous <Continuous>  montelukast 10 milliGRAM(s) Oral at bedtime  multivitamin/minerals 1 Tablet(s) Oral daily  mupirocin 2% Ointment 1 Application(s) Topical two times a day  norepinephrine Infusion 0.05 MICROgram(s)/kG/Min IV Continuous <Continuous>  oseltamivir 30 milliGRAM(s) Oral <User Schedule>  pantoprazole   Suspension 40 milliGRAM(s) Oral daily  sodium bicarbonate  Infusion 0.138 mEq/kG/Hr IV Continuous <Continuous>  tacrolimus 0.5 milliGRAM(s) Oral every 12 hours    PRN Inpatient Medications        VITALS/PHYSICAL EXAM  --------------------------------------------------------------------------------  T(C): 36.7 (02-06-20 @ 00:00), Max: 37.3 (02-05-20 @ 12:00)  HR: 70 (02-06-20 @ 07:00) (70 - 96)  BP: --  RR: 32 (02-06-20 @ 07:00) (23 - 43)  SpO2: 99% (02-06-20 @ 07:00) (92% - 100%)  Wt(kg): --        02-05-20 @ 07:01  -  02-06-20 @ 07:00  --------------------------------------------------------  IN: 2289.8 mL / OUT: 680 mL / NET: 1609.8 mL      Physical Exam:  	Gen: NAD  	Pulm: CTA B/L  	CV: S1S2; no rub  	Abd:  soft, nontender/nondistended  	LE: multiple ecchymotic areas    LABS/STUDIES  --------------------------------------------------------------------------------              8.3    15.51 >-----------<  68       [02-06-20 @ 04:30]              26.3     139  |  104  |  76  ----------------------------<  214      [02-06-20 @ 04:30]  5.2   |  20  |  3.6        Ca     7.1     [02-06-20 @ 04:30]      Mg     2.4     [02-06-20 @ 04:30]      Phos  5.3     [02-06-20 @ 04:30]    TPro  4.9  /  Alb  2.7  /  TBili  0.5  /  DBili  x   /  AST  16  /  ALT  18  /  AlkPhos  157  [02-06-20 @ 04:30]    PT/INR: PT 20.80, INR 1.81       [02-06-20 @ 04:30]  PTT: 26.6       [02-06-20 @ 04:30]      Creatinine Trend:  SCr 3.6 [02-06 @ 04:30]  SCr 3.8 [02-05 @ 23:45]  SCr 3.7 [02-05 @ 15:50]  SCr 3.1 [02-05 @ 04:00]  SCr 3.2 [02-05 @ 02:00]    Urinalysis - [01-31-20 @ 17:11]      Color Yellow / Appearance Slightly Turbid / SG 1.025 / pH 6.0      Gluc 100 mg/dL / Ketone Small  / Bili Negative / Urobili <2 mg/dL       Blood Small / Protein 100 mg/dL / Leuk Est Negative / Nitrite Negative      RBC 1 / WBC 9 / Hyaline 14 / Gran  / Sq Epi  / Non Sq Epi 8 / Bacteria Negative      HbA1c 6.9      [03-12-18 @ 04:54]  TSH 0.57      [02-01-20 @ 04:46]  Lipid: chol 203, , HDL 74,       [02-01-20 @ 04:46]      Rheumatoid Factor 59      [02-01-20 @ 04:46]  anti-GBM <1.0      [02-01-20 @ 04:46]  Free Light Chains: kappa 4.52, lambda 3.30, ratio = 1.37      [02-01 @ 04:46]  Immunofixation Serum:   No Monoclonal Band Identified    Reference Range: None Detected      [02-01-20 @ 04:46]  SPEP Interpretation: Normal Electrophoresis Pattern      [02-01-20 @ 04:46]

## 2020-02-06 NOTE — PROGRESS NOTE ADULT - ASSESSMENT
75 y/o female with pmhx of asthma, HTN,  autoimmune hepatitis, heterozygous prothrombin gene mutation with hx of PE and DVT on coumadin admitted for SOB     Acute hypoxic resp failure with hypoxia due to influenza and HCAP, Septic shock in immunocompromised patient  - remains intubated, sedation held  - post Bronch awaiting results  - remains on IV Solu-medrol, Vancomycin, Cefepime and Levaquin  - to complete Oseltamivir 75 mg BID for 10 days due to immunocompromised state  - continue vent support with PEEP decreased down to 10, Duoneb q6h and q4h PRN    REJI likely ATN  - holding Lasix and home BP medications  - creatinine  - renal following    Autoimmune hepatitis  - On prednisone 60 mg PO qd and Tacrolimus at home  - now on Solu-medrol 60 mg iv BID  trending downward  - remains Tacrolimus 0.5 q12h    Recent CMV infection in January 2020:  - pending CMV PCR    HTN  - now hypotensive on pressors  - Amlodipine 10 mg qd and Lopressor 50 mg PO qd held    Heterozygous prothrombin gene mutation with hx of PE and DVT on coumadin:  - holding Coumadin for suspected alveolar hemorrhage     Hx of asthma  - on Montelukast qd    GI PPx: Protonix 40 mg PO qd  DVT PPx: sequentials  Activity: bedrest  Dispo: readmitted to hospital from [facility] rehab at [facility], ICU for now  Code Status: FULL

## 2020-02-06 NOTE — PROGRESS NOTE ADULT - ASSESSMENT
IMPRESSION:    Acute hypoxic respiratory failure  DAH  Influenza A treated   ARDS  REJI oliguric   HO Autoimmune hepatitis on tacrolimus and chronic steroids   h/o hypercoagulable state/ vte was on coumadin     PLAN:    CNS:  SAT     HEENT: ET care.  Oral care     PULMONARY:  HOB @ 45 degrees. Solumedrol 80 mg q6h for now. Wean O2 .  PEEP 10.   FU PPl and Driving Pressure.     CARDIOVASCULAR: I=O.  Wean Levophed .  r      GI: GI prophylaxis.  DC motility agts.  restart feeds.  Try Peptamen AF     RENAL:  Follow up lytes. Replete as needed. DC Hogan.  FU with Renal     INFECTIOUS DISEASE: Follow up cultures.  Finish ABX course    HEMATOLOGICAL:  DVT prophylaxis seq. f/u INR ,.  FU CBC and Coags.     ENDOCRINE:  Follow up FS.  Insulin protocol if needed.    MUSCULOSKELETAL: Bed rest     Code status: full     Prognosis: Poor prognosis     Continue MICU monitoring for now.

## 2020-02-06 NOTE — PROGRESS NOTE ADULT - SUBJECTIVE AND OBJECTIVE BOX
ELDER, DONI  74y  Female  HPI:  75y/o F w/ hx of asthma, COPD not on home O2, autoimmune hepatitis on prednisone and tacrolimus, heterozygous prothrombin gene mutation with hx of PE and DVT on coumadin with recent hospitalization in January 2020 with a diagnosis of pneumonia presents for worsening SOB, hemoptysis and cough. Everything started yesterday night when patient was in bed, started to feel shortness of breath and had many episodes of hemoptysis with clots, she also had substernal chest pain non radiating, moderate in intensity that worsens on coughing. She denies fever but endorses chills. She also admits for lightheadedness that occurred yesterday, no HA, abd pain, dysuria or paresthesias. She had 2 loose bowel movements since yesterday with some blood in the stools that she attributes to her hemorrhoids. She stopped Coumadin couple of days ago since her INR was supratherapeutic. She is from [facility] short term and also mentions that her  and another family member have the flu and she was exposed to them. She did not have the flu shot this season.  In ED, temp was 100.1 rectally, tachycardic ( sinus) , she was saturating to 70s on non rebreather and her SaO2 went up to 95%. ABG showing respiratory alkalosis initially and then corrected after BIPAP placement and correction of RR.  CTA chest showing no PE but showing interval development of multifocal bilateral groundglass opacities as well as new moderate to severe bronchiectasis in the right lung. Findings are concerning for an acute infectious/inflammatory process. (31 Jan 2020 14:36)    MEDICATIONS  (STANDING):  ALBUTerol    90 MICROgram(s) HFA Inhaler 1 Puff(s) Inhalation every 6 hours  calcium carbonate    500 mG (Tums) Chewable 2 Tablet(s) Chew every 8 hours  cefepime   IVPB 2000 milliGRAM(s) IV Intermittent <User Schedule>  chlorhexidine 0.12% Liquid 15 milliLiter(s) Oral Mucosa every 12 hours  chlorhexidine 4% Liquid 1 Application(s) Topical <User Schedule>  cisatracurium Infusion 1 MICROgram(s)/kG/Min (4.89 mL/Hr) IV Continuous <Continuous>  cyanocobalamin 1000 MICROGram(s) Oral daily  dexMEDEtomidine Infusion 0.5 MICROgram(s)/kG/Hr (10.188 mL/Hr) IV Continuous <Continuous>  fentaNYL   Infusion. 0.5 MICROgram(s)/kG/Hr (3.77 mL/Hr) IV Continuous <Continuous>  gabapentin 300 milliGRAM(s) Oral at bedtime  influenza   Vaccine 0.5 milliLiter(s) IntraMuscular once  insulin regular Infusion 4 Unit(s)/Hr (4 mL/Hr) IV Continuous <Continuous>  ipratropium 17 MICROgram(s) HFA Inhaler 1 Puff(s) Inhalation every 6 hours  lactated ringers. 500 milliLiter(s) (500 mL/Hr) IV Continuous <Continuous>  levoFLOXacin IVPB 500 milliGRAM(s) IV Intermittent every 48 hours  methylPREDNISolone sodium succinate Injectable 80 milliGRAM(s) IV Push every 8 hours  midazolam Infusion 0.02 mG/kG/Hr (1.508 mL/Hr) IV Continuous <Continuous>  montelukast 10 milliGRAM(s) Oral at bedtime  multivitamin/minerals 1 Tablet(s) Oral daily  mupirocin 2% Ointment 1 Application(s) Topical two times a day  norepinephrine Infusion 0.05 MICROgram(s)/kG/Min (3.534 mL/Hr) IV Continuous <Continuous>  oseltamivir 30 milliGRAM(s) Oral <User Schedule>  pantoprazole   Suspension 40 milliGRAM(s) Oral daily  sodium bicarbonate  Infusion 0.092 mEq/kG/Hr (50 mL/Hr) IV Continuous <Continuous>  tacrolimus 0.5 milliGRAM(s) Oral every 12 hours    MEDICATIONS  (PRN):    INTERVAL EVENTS: Patient seen today, case discussed with pulmonary fellow and primary nurse. Patient weaned off sedation. Attempting to wean off pressors. Patient 's vent setting have gradually been reduced.    T(C): 36.1 (02-06-20 @ 20:00), Max: 36.7 (02-06-20 @ 00:00)  HR: 72 (02-06-20 @ 22:00) (66 - 118)  BP: --  RR: 35 (02-06-20 @ 22:00) (23 - 38)  SpO2: 99% (02-06-20 @ 22:00) (72% - 100%)  Wt(kg): --Vital Signs Last 24 Hrs  T(C): 36.1 (06 Feb 2020 20:00), Max: 36.7 (06 Feb 2020 00:00)  T(F): 97 (06 Feb 2020 20:00), Max: 98 (06 Feb 2020 00:00)  HR: 72 (06 Feb 2020 22:00) (66 - 118)  BP: --  BP(mean): --  RR: 35 (06 Feb 2020 22:00) (23 - 38)  SpO2: 99% (06 Feb 2020 22:00) (72% - 100%)    PHYSICAL EXAM:  GENERAL: NAD, support in place, unresponsive to verbal stimuli  NECK: Supple, No JVD, TLC in place  CHEST/LUNG: Coarse BS  HEART: S1, S2, Regular rate and rhythm  ABDOMEN: Soft, Nontender? Bowel sounds present  EXTREMITIES: ++ edema  SKIN: bruising, thin skin, skin tears covered    LABS:                        7.5    10.13 )-----------( 59       ( 06 Feb 2020 19:40 )             23.3             02-06    138  |  103  |  87<HH>  ----------------------------<  259<H>  4.6   |  18  |  4.2<HH>    Ca    7.2<L>      06 Feb 2020 20:30  Phos  5.9     02-06  Mg     2.4     02-06    TPro  4.9<L>  /  Alb  2.7<L>  /  TBili  0.5  /  DBili  x   /  AST  16  /  ALT  17  /  AlkPhos  156<H>  02-06    LIVER FUNCTIONS - ( 06 Feb 2020 18:20 )  Alb: 2.7 g/dL / Pro: 4.9 g/dL / ALK PHOS: 156 U/L / ALT: 17 U/L / AST: 16 U/L / GGT: x                   PT/INR - ( 06 Feb 2020 04:30 )   PT: 20.80 sec;   INR: 1.81 ratio         PTT - ( 06 Feb 2020 04:30 )  PTT:26.6 sec      ABG - ( 06 Feb 2020 17:38 )  pH, Arterial: 7.30  pH, Blood: x     /  pCO2: 46    /  pO2: 44    / HCO3: 22    / Base Excess: -3.9  /  SaO2: 75          RADIOLOGY & ADDITIONAL TESTS:  < from: Xray Chest 1 View- PORTABLE-Routine (02.06.20 @ 04:48) >  FINDINGS:    SUPPORT DEVICES: Endotracheal and enteric tubes are in stable positions.  Stable right internal jugular central venous catheter.    CARDIAC/MEDIASTINUM/HILUM: Unchanged.    LUNG PARENCHYMA/PLEURA: Stable bilateral pleural plaques. Bilateral lung  opacities, unchanged. No pneumothorax.    SKELETON/SOFT TISSUES: Unchanged.    IMPRESSION:    No change since the prior exam.    < end of copied text >

## 2020-02-07 NOTE — PROGRESS NOTE ADULT - SUBJECTIVE AND OBJECTIVE BOX
DONI PATRICK  74y, Female  Allergy: No Known Allergies      LOS  7d    CHIEF COMPLAINT: SOB and desaturation (07 Feb 2020 10:11)      INTERVAL EVENTS/HPI  - No acute events overnight, agitated off sedation, off pressors  - T(F): , Max: 97.3 (02-07-20 @ 00:00)  - WBC Count: 8.60 (02-07-20 @ 04:45)  WBC Count: 9.02 (02-06-20 @ 23:40)  - Creatinine, Serum: 4.6 (02-07-20 @ 04:45)  Creatinine, Serum: 4.2 (02-06-20 @ 20:30)       ROS  unable to obtain history secondary to patient's mental status and/or sedation     VITALS:  T(F): 96, Max: 97.3 (02-07-20 @ 00:00)  HR: 64  BP: 100/45  RR: 25Vital Signs Last 24 Hrs  T(C): 35.6 (07 Feb 2020 08:00), Max: 36.3 (07 Feb 2020 00:00)  T(F): 96 (07 Feb 2020 08:00), Max: 97.3 (07 Feb 2020 00:00)  HR: 64 (07 Feb 2020 11:30) (62 - 118)  BP: 100/45 (07 Feb 2020 10:00) (100/45 - 100/45)  BP(mean): 67 (07 Feb 2020 10:00) (67 - 67)  RR: 25 (07 Feb 2020 11:30) (25 - 37)  SpO2: 100% (07 Feb 2020 11:30) (72% - 100%)    PHYSICAL EXAM:  Gen: intubated  HEENT: Normocephalic, atraumatic  Neck: supple, no lymphadenopathy  CV: Regular rate & regular rhythm  Lungs: decreased BS at bases, no fremitus  Abdomen: Soft, BS present  Ext: Warm, well perfused, anasarca  Neuro: sedated  Skin: edema/erythema, ecchymoses UE LE, open wound L knee, L wrist, pustular rash chest   Lines: no phlebitis      FH: Non-contributory  Social Hx: Non-contributory    TESTS & MEASUREMENTS:                        7.6    8.60  )-----------( 51       ( 07 Feb 2020 04:45 )             23.3     02-07    140  |  103  |  97<HH>  ----------------------------<  110<H>  4.2   |  21  |  4.6<HH>    Ca    7.4<L>      07 Feb 2020 04:45  Phos  5.9     02-06  Mg     2.4     02-07    TPro  4.5<L>  /  Alb  2.4<L>  /  TBili  0.4  /  DBili  x   /  AST  17  /  ALT  17  /  AlkPhos  147<H>  02-07    eGFR if Non African American: 9 mL/min/1.73M2 (02-07-20 @ 04:45)  eGFR if African American: 10 mL/min/1.73M2 (02-07-20 @ 04:45)  eGFR if Non African American: 10 mL/min/1.73M2 (02-06-20 @ 20:30)  eGFR if : 11 mL/min/1.73M2 (02-06-20 @ 20:30)  eGFR if Non African American: 9 mL/min/1.73M2 (02-06-20 @ 19:40)  eGFR if : 11 mL/min/1.73M2 (02-06-20 @ 19:40)  eGFR if Non African American: 11 mL/min/1.73M2 (02-06-20 @ 18:20)  eGFR if : 12 mL/min/1.73M2 (02-06-20 @ 18:20)    LIVER FUNCTIONS - ( 07 Feb 2020 04:45 )  Alb: 2.4 g/dL / Pro: 4.5 g/dL / ALK PHOS: 147 U/L / ALT: 17 U/L / AST: 17 U/L / GGT: x               Culture - Acid Fast - Bronchial w/Smear (collected 02-03-20 @ 15:00)  Source: .Bronchial None  Preliminary Report (02-05-20 @ 15:04):    Culture is being performed.    Culture - Fungal, Bronchial (collected 02-03-20 @ 15:00)  Source: .Bronchial None  Preliminary Report (02-04-20 @ 08:06):    Testing in progress    Culture - Bronchial (collected 02-03-20 @ 15:00)  Source: .Bronchial None  Gram Stain (02-04-20 @ 05:52):    No polymorphonuclear cells seen per low power field    No squamous epithelial cells per low power field    No organisms seen  Final Report (02-05-20 @ 19:32):    Normal Respiratory Nikki present            INFECTIOUS DISEASES TESTING  Fungitell: <31 (02-04-20 @ 04:40)  Streptococcus Pneumoniae Ag Urine: Negative (02-02-20 @ 11:00)  MRSA PCR Result.: Positive (02-01-20 @ 20:40)  Fungitell: 49 (02-01-20 @ 11:17)  Rapid RVP Result: Detected (01-31-20 @ 16:24)  Legionella Antigen, Urine: Negative (01-31-20 @ 15:36)  Streptococcus Pneumoniae Ag Urine: Negative (01-31-20 @ 15:36)  Legionella Antigen, Urine: Negative (01-20-20 @ 02:50)  MRSA PCR Result.: Positive (01-14-20 @ 13:59)  MRSA PCR Result.: Negative (08-13-19 @ 08:46)      RADIOLOGY & ADDITIONAL TESTS:  I have personally reviewed the last available Chest xray  CXR      CT      CARDIOLOGY TESTING  12 Lead ECG:   Ventricular Rate 118 BPM    Atrial Rate 118 BPM    P-R Interval 150 ms    QRS Duration 80 ms    Q-T Interval 326 ms    QTC Calculation(Bezet) 456 ms    P Axis 41 degrees    R Axis -10 degrees    T Axis 11 degrees    Diagnosis Line Sinus tachycardia  Possible Left atrial enlargement  Inferior infarct , age undetermined  Abnormal ECG    Confirmed by MOHSEN SONG MD [id number]) on 1/31/2020 12:38:55 PM (01-31-20 @ 11:06)      MEDICATIONS  ALBUTerol    90 MICROgram(s) HFA Inhaler 1  calcium carbonate    500 mG (Tums) Chewable 2  chlorhexidine 0.12% Liquid 15  chlorhexidine 4% Liquid 1  cyanocobalamin 1000  dexMEDEtomidine Infusion 0.5  gabapentin 300  influenza   Vaccine 0.5  insulin regular Infusion 4  ipratropium 17 MICROgram(s) HFA Inhaler 1  methylPREDNISolone sodium succinate Injectable 80  montelukast 10  multivitamin/minerals 1  mupirocin 2% Ointment 1  norepinephrine Infusion 0.05  oseltamivir 30  pantoprazole   Suspension 40  sodium bicarbonate 650  tacrolimus 0.5      WEIGHT  Weight (kg): 81.5 (02-03-20 @ 12:15)    ANTIBIOTICS:  oseltamivir 30 milliGRAM(s) Oral <User Schedule>      All available historical records have been reviewed

## 2020-02-07 NOTE — PROGRESS NOTE ADULT - SUBJECTIVE AND OBJECTIVE BOX
Patient is a 74y old  Female who presents with a chief complaint of SOB and desaturation (06 Feb 2020 22:45)        Over Night Events:  HAd some desaturation last night.  Sedated and paralyzed.  Off pressors.          ROS:     CONSTITUTIONAL:   no fever   no chills.  no weight gain   no weight loss    EYES:   no discharge,   no pain  no redness,   no visual changes.    ENT:   Ears: no ear pain and no hearing problems.  Nose: no nasal congestion and no nasal drainage.  Mouth/Throat: no dysphagia,  no hoarseness and no throat pain.  Neck: no lumps, no pain, no stiffness and no swollen glands.     CARDIOVASCULAR:   no chest pain,   no swelling  no palpitaions  no syncope    RESPIRATORY:  Per HPI     GASTROINTESTINAL:   no abdominal pain,   no constipation,   no diarrhea,   no vomiting.    GENITOURINARY:  no dysuria,   no frequency,   no urgency  no hematuria.    MUSCULOSKELETAL:   no back pain,   no musculoskeletal pain,  no weakness.    SKIN:   no jaundice,   no lesions,   no pruritis,   no rashes.    NEURO:   no loss of consciousness,   no gait abnormality,   no headache,   no sensory deficits,   no weakness.    PSYCHIATRIC:   no known mental health issues  no anxiety  no depression    ALLERGIC/IMMUNOLOGIC:   No active allergic or immunologic issues        PHYSICAL EXAM    ICU Vital Signs Last 24 Hrs  T(C): 36.1 (07 Feb 2020 04:00), Max: 36.3 (07 Feb 2020 00:00)  T(F): 97 (07 Feb 2020 04:00), Max: 97.3 (07 Feb 2020 00:00)  HR: 62 (07 Feb 2020 07:00) (62 - 118)  BP: --  BP(mean): --  ABP: 104/50 (07 Feb 2020 07:00) (92/44 - 168/64)  ABP(mean): 70 (07 Feb 2020 07:00) (60 - 102)  RR: 27 (07 Feb 2020 07:00) (25 - 38)  SpO2: 100% (07 Feb 2020 07:00) (72% - 100%)      CONSTITUTIONAL:   Ill appearing.  Well nourished.  NAD    ENT:   Airway patent,   Mouth with normal mucosa.   No thrush    EYES:   Pupils equal,   Round and reactive to light.    CARDIAC:   Normal rate,   Regular rhythm.     edema      Vascular:  Normal systolic impulse  No Carotid bruits    RESPIRATORY:   No wheezing  Bilateral BS  Normal chest expansion  Not tachypneic,  No use of accessory muscles    GASTROINTESTINAL:  Abdomen soft,   Non-tender,   No guarding,   + BS    GENITOURINARY  normal genitalia for sex  edema    MUSCULOSKELETAL:   Range of motion is not limited,  No muscle or joint tenderness  No clubbing, cyanosis    NEUROLOGICAL:   Sedated and paralyzed    SKIN:   Skin normal color for race,   Warm  No evidence of rash.    PSYCHIATRIC:   Sedated and paralyzed   No apparent risk to self or others.    HEME LYMPH:   No cervical  lymphadenopathy.  no inguinal lymphadenopathy      02-06-20 @ 07:01  -  02-07-20 @ 07:00  --------------------------------------------------------  IN:    dexmedetomidine Infusion: 149.8 mL    dexmedetomidine Infusion: 60.9 mL    fentaNYL Infusion.: 32.6 mL    insulin regular Infusion: 126 mL    IV PiggyBack: 50 mL    midazolam Infusion: 12 mL    norepinephrine Infusion: 21.7 mL    Peptamen A.F.: 500 mL    sodium bicarbonate  Infusion: 1100 mL    sodium bicarbonate  Infusion: 150 mL  Total IN: 2203 mL    OUT:    Indwelling Catheter - Urethral: 72 mL    Rectal Tube: 100 mL  Total OUT: 172 mL    Total NET: 2031 mL          LABS:                            7.6    8.60  )-----------( 51       ( 07 Feb 2020 04:45 )             23.3              7.6    8.60  )-----------( 51       ( 02-07 @ 04:45 )             23.3                7.6    9.02  )-----------( 58       ( 02-06 @ 23:40 )             23.4                7.5    10.13 )-----------( 59       ( 02-06 @ 19:40 )             23.3                8.2    8.92  )-----------( 58       ( 02-06 @ 18:20 )             25.8                8.3    15.51 )-----------( 68       ( 02-06 @ 04:30 )             26.3                8.7    14.97 )-----------( 59       ( 02-05 @ 23:45 )             27.2                9.1    16.26 )-----------( 72       ( 02-05 @ 15:50 )             28.5                9.5    20.16 )-----------( 96       ( 02-05 @ 04:00 )             31.2                                                     02-07    140  |  103  |  97<HH>  ----------------------------<  110<H>  4.2   |  21  |  4.6<HH>    Ca    7.4<L>      07 Feb 2020 04:45  Phos  5.9     02-06  Mg     2.4     02-07    TPro  4.5<L>  /  Alb  2.4<L>  /  TBili  0.4  /  DBili  x   /  AST  17  /  ALT  17  /  AlkPhos  147<H>  02-07      PT/INR - ( 06 Feb 2020 04:30 )   PT: 20.80 sec;   INR: 1.81 ratio         PTT - ( 06 Feb 2020 04:30 )  PTT:26.6 sec                                                                                     LIVER FUNCTIONS - ( 07 Feb 2020 04:45 )  Alb: 2.4 g/dL / Pro: 4.5 g/dL / ALK PHOS: 147 U/L / ALT: 17 U/L / AST: 17 U/L / GGT: x                                                                                               Mode: AC/ CMV (Assist Control/ Continuous Mandatory Ventilation)  RR (machine): 26  TV (machine): 450  FiO2: 50  PEEP: 12.5  ITime: 1  MAP: 21  PIP: 36                                      ABG - ( 07 Feb 2020 04:05 )  pH, Arterial: 7.38  pH, Blood: x     /  pCO2: 41    /  pO2: 121   / HCO3: 24    / Base Excess: -1.0  /  SaO2: 99                  MEDICATIONS  (STANDING):  ALBUTerol    90 MICROgram(s) HFA Inhaler 1 Puff(s) Inhalation every 6 hours  calcium carbonate    500 mG (Tums) Chewable 2 Tablet(s) Chew every 8 hours  cefepime   IVPB 2000 milliGRAM(s) IV Intermittent <User Schedule>  chlorhexidine 0.12% Liquid 15 milliLiter(s) Oral Mucosa every 12 hours  chlorhexidine 4% Liquid 1 Application(s) Topical <User Schedule>  cyanocobalamin 1000 MICROGram(s) Oral daily  dexMEDEtomidine Infusion 0.5 MICROgram(s)/kG/Hr (10.188 mL/Hr) IV Continuous <Continuous>  gabapentin 300 milliGRAM(s) Oral at bedtime  influenza   Vaccine 0.5 milliLiter(s) IntraMuscular once  insulin regular Infusion 4 Unit(s)/Hr (4 mL/Hr) IV Continuous <Continuous>  ipratropium 17 MICROgram(s) HFA Inhaler 1 Puff(s) Inhalation every 6 hours  levoFLOXacin IVPB 500 milliGRAM(s) IV Intermittent every 48 hours  methylPREDNISolone sodium succinate Injectable 80 milliGRAM(s) IV Push every 8 hours  montelukast 10 milliGRAM(s) Oral at bedtime  multivitamin/minerals 1 Tablet(s) Oral daily  mupirocin 2% Ointment 1 Application(s) Topical two times a day  norepinephrine Infusion 0.05 MICROgram(s)/kG/Min (3.534 mL/Hr) IV Continuous <Continuous>  oseltamivir 30 milliGRAM(s) Oral <User Schedule>  pantoprazole   Suspension 40 milliGRAM(s) Oral daily  sodium bicarbonate  Infusion 0.092 mEq/kG/Hr (50 mL/Hr) IV Continuous <Continuous>  tacrolimus 0.5 milliGRAM(s) Oral every 12 hours    MEDICATIONS  (PRN):      New X-rays reviewed:                                                                                  ECHO    CXR interpreted by me:  ET OG TLC OK.  Improved infiltrates Patient is a 74y old  Female who presents with a chief complaint of SOB and desaturation (06 Feb 2020 22:45)        Over Night Events:  HAd some desaturation last night.  Sedated.  Off pressors.          ROS:     CONSTITUTIONAL:   no fever   no chills.  no weight gain   no weight loss    EYES:   no discharge,   no pain  no redness,   no visual changes.    ENT:   Ears: no ear pain and no hearing problems.  Nose: no nasal congestion and no nasal drainage.  Mouth/Throat: no dysphagia,  no hoarseness and no throat pain.  Neck: no lumps, no pain, no stiffness and no swollen glands.     CARDIOVASCULAR:   no chest pain,   no swelling  no palpitaions  no syncope    RESPIRATORY:  Per HPI     GASTROINTESTINAL:   no abdominal pain,   no constipation,   no diarrhea,   no vomiting.    GENITOURINARY:  no dysuria,   no frequency,   no urgency  no hematuria.    MUSCULOSKELETAL:   no back pain,   no musculoskeletal pain,  no weakness.    SKIN:   no jaundice,   no lesions,   no pruritis,   no rashes.    NEURO:   no loss of consciousness,   no gait abnormality,   no headache,   no sensory deficits,   no weakness.    PSYCHIATRIC:   no known mental health issues  no anxiety  no depression    ALLERGIC/IMMUNOLOGIC:   No active allergic or immunologic issues        PHYSICAL EXAM    ICU Vital Signs Last 24 Hrs  T(C): 36.1 (07 Feb 2020 04:00), Max: 36.3 (07 Feb 2020 00:00)  T(F): 97 (07 Feb 2020 04:00), Max: 97.3 (07 Feb 2020 00:00)  HR: 62 (07 Feb 2020 07:00) (62 - 118)  BP: --  BP(mean): --  ABP: 104/50 (07 Feb 2020 07:00) (92/44 - 168/64)  ABP(mean): 70 (07 Feb 2020 07:00) (60 - 102)  RR: 27 (07 Feb 2020 07:00) (25 - 38)  SpO2: 100% (07 Feb 2020 07:00) (72% - 100%)      CONSTITUTIONAL:   Ill appearing.  Well nourished.  NAD    ENT:   Airway patent,   Mouth with normal mucosa.   No thrush    EYES:   Pupils equal,   Round and reactive to light.    CARDIAC:   Normal rate,   Regular rhythm.     edema      Vascular:  Normal systolic impulse  No Carotid bruits    RESPIRATORY:   No wheezing  Bilateral BS  Normal chest expansion  Not tachypneic,  No use of accessory muscles    GASTROINTESTINAL:  Abdomen soft,   Non-tender,   No guarding,   + BS    GENITOURINARY  normal genitalia for sex  edema    MUSCULOSKELETAL:   Range of motion is not limited,  No muscle or joint tenderness  No clubbing, cyanosis    NEUROLOGICAL:   Sedated and paralyzed    SKIN:   Skin normal color for race,   Warm  No evidence of rash.    PSYCHIATRIC:   Sedated and paralyzed   No apparent risk to self or others.    HEME LYMPH:   No cervical  lymphadenopathy.  no inguinal lymphadenopathy      02-06-20 @ 07:01  -  02-07-20 @ 07:00  --------------------------------------------------------  IN:    dexmedetomidine Infusion: 149.8 mL    dexmedetomidine Infusion: 60.9 mL    fentaNYL Infusion.: 32.6 mL    insulin regular Infusion: 126 mL    IV PiggyBack: 50 mL    midazolam Infusion: 12 mL    norepinephrine Infusion: 21.7 mL    Peptamen A.F.: 500 mL    sodium bicarbonate  Infusion: 1100 mL    sodium bicarbonate  Infusion: 150 mL  Total IN: 2203 mL    OUT:    Indwelling Catheter - Urethral: 72 mL    Rectal Tube: 100 mL  Total OUT: 172 mL    Total NET: 2031 mL          LABS:                            7.6    8.60  )-----------( 51       ( 07 Feb 2020 04:45 )             23.3              7.6    8.60  )-----------( 51       ( 02-07 @ 04:45 )             23.3                7.6    9.02  )-----------( 58       ( 02-06 @ 23:40 )             23.4                7.5    10.13 )-----------( 59       ( 02-06 @ 19:40 )             23.3                8.2    8.92  )-----------( 58       ( 02-06 @ 18:20 )             25.8                8.3    15.51 )-----------( 68       ( 02-06 @ 04:30 )             26.3                8.7    14.97 )-----------( 59       ( 02-05 @ 23:45 )             27.2                9.1    16.26 )-----------( 72       ( 02-05 @ 15:50 )             28.5                9.5    20.16 )-----------( 96       ( 02-05 @ 04:00 )             31.2                                                     02-07    140  |  103  |  97<HH>  ----------------------------<  110<H>  4.2   |  21  |  4.6<HH>    Ca    7.4<L>      07 Feb 2020 04:45  Phos  5.9     02-06  Mg     2.4     02-07    TPro  4.5<L>  /  Alb  2.4<L>  /  TBili  0.4  /  DBili  x   /  AST  17  /  ALT  17  /  AlkPhos  147<H>  02-07      PT/INR - ( 06 Feb 2020 04:30 )   PT: 20.80 sec;   INR: 1.81 ratio         PTT - ( 06 Feb 2020 04:30 )  PTT:26.6 sec                                                                                     LIVER FUNCTIONS - ( 07 Feb 2020 04:45 )  Alb: 2.4 g/dL / Pro: 4.5 g/dL / ALK PHOS: 147 U/L / ALT: 17 U/L / AST: 17 U/L / GGT: x                                                                                               Mode: AC/ CMV (Assist Control/ Continuous Mandatory Ventilation)  RR (machine): 26  TV (machine): 450  FiO2: 50  PEEP: 12.5  ITime: 1  MAP: 21  PIP: 36                                      ABG - ( 07 Feb 2020 04:05 )  pH, Arterial: 7.38  pH, Blood: x     /  pCO2: 41    /  pO2: 121   / HCO3: 24    / Base Excess: -1.0  /  SaO2: 99                  MEDICATIONS  (STANDING):  ALBUTerol    90 MICROgram(s) HFA Inhaler 1 Puff(s) Inhalation every 6 hours  calcium carbonate    500 mG (Tums) Chewable 2 Tablet(s) Chew every 8 hours  cefepime   IVPB 2000 milliGRAM(s) IV Intermittent <User Schedule>  chlorhexidine 0.12% Liquid 15 milliLiter(s) Oral Mucosa every 12 hours  chlorhexidine 4% Liquid 1 Application(s) Topical <User Schedule>  cyanocobalamin 1000 MICROGram(s) Oral daily  dexMEDEtomidine Infusion 0.5 MICROgram(s)/kG/Hr (10.188 mL/Hr) IV Continuous <Continuous>  gabapentin 300 milliGRAM(s) Oral at bedtime  influenza   Vaccine 0.5 milliLiter(s) IntraMuscular once  insulin regular Infusion 4 Unit(s)/Hr (4 mL/Hr) IV Continuous <Continuous>  ipratropium 17 MICROgram(s) HFA Inhaler 1 Puff(s) Inhalation every 6 hours  levoFLOXacin IVPB 500 milliGRAM(s) IV Intermittent every 48 hours  methylPREDNISolone sodium succinate Injectable 80 milliGRAM(s) IV Push every 8 hours  montelukast 10 milliGRAM(s) Oral at bedtime  multivitamin/minerals 1 Tablet(s) Oral daily  mupirocin 2% Ointment 1 Application(s) Topical two times a day  norepinephrine Infusion 0.05 MICROgram(s)/kG/Min (3.534 mL/Hr) IV Continuous <Continuous>  oseltamivir 30 milliGRAM(s) Oral <User Schedule>  pantoprazole   Suspension 40 milliGRAM(s) Oral daily  sodium bicarbonate  Infusion 0.092 mEq/kG/Hr (50 mL/Hr) IV Continuous <Continuous>  tacrolimus 0.5 milliGRAM(s) Oral every 12 hours    MEDICATIONS  (PRN):      New X-rays reviewed:                                                                                  ECHO    CXR interpreted by me:  ET OG TLC OK.  Improved infiltrates

## 2020-02-07 NOTE — PROGRESS NOTE ADULT - ASSESSMENT
IMPRESSION:    Acute hypoxic respiratory failure  DAH  Influenza A treated   ARDS  REJI oliguric   HO Autoimmune hepatitis on tacrolimus and chronic steroids   h/o hypercoagulable state/ vte was on coumadin   Left great saphenous vein thrombosis     PLAN:    CNS:  Hold paralysis and reassess     HEENT: ET care.  Oral care     PULMONARY:  HOB @ 45 degrees. Solumedrol 80 mg q8h for now. Wean O2  PEEP 10.   FU PPl and Driving Pressure.     CARDIOVASCULAR: I=O.      GI: GI prophylaxis.  DC motility agts.  restart feeds.  Try Peptamen AF     RENAL:  Follow up lytes. Replete as needed.  FU with Renal might need RRT     INFECTIOUS DISEASE: Follow up cultures.  Finish ABX course    HEMATOLOGICAL:  DVT prophylaxis seq. f/u INR ,.  FU CBC and Coags.     ENDOCRINE:  Follow up FS.  Insulin protocol if needed.    MUSCULOSKELETAL: Bed rest     Code status: full     Prognosis: Poor prognosis     Continue MICU monitoring for now. IMPRESSION:    Acute hypoxic respiratory failure  DAH  Influenza A treated   ARDS  REJI oliguric   HO Autoimmune hepatitis on tacrolimus and chronic steroids   h/o hypercoagulable state/ vte was on coumadin   Left great saphenous vein thrombosis     PLAN:    CNS:  SAT     HEENT: ET care.  Oral care     PULMONARY:  HOB @ 45 degrees. Solumedrol 80 mg q8h for now. Wean O2  PEEP 10.   FU PPl and Driving Pressure.     CARDIOVASCULAR: I=O.      GI: GI prophylaxis.  Continue feeding.       RENAL:  Follow up lytes. Replete as needed.  FU with Renal might need RRT     INFECTIOUS DISEASE: Follow up cultures.  Finish ABX course    HEMATOLOGICAL:  DVT prophylaxis.  Repeat LE Duplex in AM.  DW IR.  If progression, then IVC    ENDOCRINE:  Follow up FS.  Insulin protocol if needed.    MUSCULOSKELETAL: Bed rest     Code status: full     Prognosis: Poor prognosis     Continue MICU monitoring for now.

## 2020-02-07 NOTE — PROGRESS NOTE ADULT - ASSESSMENT
73 y/o F with REJI in hospital for SOB, hemoptysis, cough.  PMH asthma, COPD not on home O2, autoimmune hepatitis on prednisone and tacrolimus, heterozygous prothrombin gene mutation with hx of PE and DVT on coumadin, recent hospitalization for pneumonia   # REJI/ ATN in nature/ vanco toxicity?  #  ? UO , please check bladder scan   # crratinine noted   #ID notes appreciated follow recs   # on FK continue for now trough noted doubt true trough on low dose/ please repeat trough , goal around 5   # ph at goal, corrected calcium around 8.2, on Ca carbonate , check PTH levels   # start sodium bicarbonate 650 q 8   # d/c iv fluids   # platelet count stable   # no acute indication for RRT  # will follow  # prognosis guarded

## 2020-02-07 NOTE — PROGRESS NOTE ADULT - ASSESSMENT
ASSESSMENT  75y/o F w/ hx of asthma, COPD not on home O2, autoimmune hepatitis on prednisone and tacrolimus, heterozygous prothrombin gene mutation with hx of PE and DVT on coumadin with recent hospitalization in January 2020 with a diagnosis of pneumonia presents for worsening SOB, hemoptysis and cough.    IMPRESSION  #Flu + AH1, Alveolar hemorrhage, ?superimposed atypical PNA (imaging not really consistent with bacterial PNA). Recent bronch 1/20 negative for PCP, Fungal, etc, previous bronch cx with yeast (likely oral contaminant) since GMS neg    2/3 Bronch   No organisms seen, 2/3 cytopath Rare atypical cells, few ciliated bronchial cells, alveolar macrophages and inflammatory cells, mainly neutrophils.    Fungitell: 49 (02-01-20 @ 11:17), Strep urine Ag neg, serum crypto Ag neg, CMV PCR undetectable, AFB neg    Quantiferon indeterminate    Lactate Dehydrogenase, Serum: 607 (02.03.20 @ 04:30)    Procalcitonin, Serum: 1.86:    CTA chest showing no PE but showing interval development of multifocal bilateral groundglass opacities as well as new moderate to severe bronchiectasis in the right lung,   1/13 CT b/l GGOs, compatible with intersitial edema      Serum Pro-Brain Natriuretic Peptide: 722 pg/mL (01.31.20 @ 09:25)<-- Serum Pro-Brain Natriuretic Peptide: 345 pg/mL (01.13.20 @ 12:10)    During last hospitalization: bronch cx bacterial NG, +yeast, pending ID, neg legionella, + MRSA PCR    MRSA PCR Result.: Positive (02-01-20 @ 20:40)  #Severe sepsis on admission P>90, WBC 19, RR>20, lactic acidosis Lactate, Blood: 2.9 mmol/L (01-31-20 @ 09:25)    afebrile   #Elevated Alk ph, transaminitis  #LLE wound, not infection     12/7 WCX MSSA, Kleb, bacteroides    8/2019 MRSA in a wound  #Immunosuppressed: autoimmune hepatitis on prednisone and tacrolimus    RECOMMENDATIONS  - oseltamivir 30 milliGRAM(s) daily 2/2-,  D5 would treat 7-10 days given immunocompromised   - bronch: f/u cx, fungal, AFB, PCP stain, viral cx, Toxo PCR  - OFF abx s/p cefepime/levaquin  - on methylPREDNISolone sodium succinate Injectable 80    Spectra [id number]

## 2020-02-07 NOTE — PROGRESS NOTE ADULT - ASSESSMENT
A 74y female with PMH of asthma, COPD not on home O2, autoimmune hepatitis on prednisone and tacrolimus, heterozygous prothrombin gene mutation with hx of PE and DVT on coumadin presents to the hospital complaining of cough, hemoptysis, SOB and desaturation to 70s.     # Acute hypoxic resp failure due to   1-alveolar hemorrhage   2-flu +ve with post viral  3-possibel PE: cant start AC due to alveolar hemorrhage      - intubated and sedated with Versed, needed Nimbex bolus, bcs of agitation,  might need cont Nimbex drip   - Solu-medrol 80mg iV q8  - s/p DDAVP 3 doses   - C/w cefepime and Levaquin D7/7 - off Vanc >> kidneys toxicity  - oseltamivir 30 mg BID for D7/10   - C/w Duoneb   - Bronch results - neg culture, neg fungal, cytology positive for inflammatory cells, no organisms    #REJI oliguric likely ATN with Vanc toxicity   - acidosis resolved  -Cr trends up to 4.6, still oliguric, check bladder scan  -might need RRT  - Renal dosing abs  - f/u FK level  - Vanc d/mark   -off Bicarb drip, started PO q8h  -Nephro recom appreciated       #clots in L Saphenous vein at the femoral Junction with possible PE!  -repeat Duplex in AM  -might need IVC filter  -not candidate for AC at this time due to alveolar hemorrhage     #Diarrhea: improving  -hold laxative  -resumed Tube feeding  -no suspicion  for C.diff at this time    #Immunosuppressed: autoimmune hepatitis on prednisone and tacrolimus  - continue with steroid and tacrolimus    # Autoimmune hepatitis  - prednisone 60 mg PO qd at home now on methylprednisolone 250mg iv Q6H  - C/w tacrolimus 0.5 q12h, level was ok, ok to keep it for now    # Recent CMV infection in January 2020  - CMV PCR was negative    # HTN  - now hypotension   - was on amlodipine 10 mg qd and lopressor 50 mg PO qd holding now due to hypotension  - wean off levophed  - Monitor BPs    # heterozygous prothrombin gene mutation with hx of PE and DVT on coumadin  - holding coumadin for now due to suspected alveolar hemorrhage   - monitor coags    #0.5 cm of GB polyp  -needs f/u US in 12 months     # Hx of asthma  - C/w montelukast qd    # GI PPx: Protonix 40 mg PO qd  # DVT PPx: sequentials  # Activity: bedrest only to right leg  # Dispo: ICU for now  # Code Status: FULL

## 2020-02-07 NOTE — PROGRESS NOTE ADULT - SUBJECTIVE AND OBJECTIVE BOX
Patient is a 74y old  Female who presents with a chief complaint of SOB and desaturation (07 Feb 2020 08:50)      OVERNIGHT EVENTS: remained intubated, becomes very agitated with desaturation off sedation, no improvement in UO     SUBJECTIVE / INTERVAL HPI: Patient seen and examined at bedside.     VITAL SIGNS:  Vital Signs Last 24 Hrs  T(C): 35.6 (07 Feb 2020 08:00), Max: 36.3 (07 Feb 2020 00:00)  T(F): 96 (07 Feb 2020 08:00), Max: 97.3 (07 Feb 2020 00:00)  HR: 86 (07 Feb 2020 08:30) (62 - 118)  BP: --  BP(mean): --  RR: 28 (07 Feb 2020 08:30) (25 - 36)  SpO2: 88% (07 Feb 2020 08:30) (72% - 100%)    PHYSICAL EXAM:    General: sedated and intubated   HEENT: NC/AT; PERRL, clear conjunctiva  Neck: supple  Cardiovascular: +S1/S2; RRR  Respiratory: CTA b/l; no W/R/R  Gastrointestinal: soft, NT/ND; +BSx4  Extremities: WWP; 2+ peripheral pulses; no edema   Neurological: sedated and intubated, withdraws to pain and moves extremities; no focal deficits    MEDICATIONS:  MEDICATIONS  (STANDING):  ALBUTerol    90 MICROgram(s) HFA Inhaler 1 Puff(s) Inhalation every 6 hours  calcium carbonate    500 mG (Tums) Chewable 2 Tablet(s) Chew every 8 hours  chlorhexidine 0.12% Liquid 15 milliLiter(s) Oral Mucosa every 12 hours  chlorhexidine 4% Liquid 1 Application(s) Topical <User Schedule>  cisatracurium Injectable 8 milliGRAM(s) IV Push once  cyanocobalamin 1000 MICROGram(s) Oral daily  dexMEDEtomidine Infusion 0.5 MICROgram(s)/kG/Hr (10.188 mL/Hr) IV Continuous <Continuous>  fentaNYL    Injectable 100 MICROGram(s) IV Push once  gabapentin 300 milliGRAM(s) Oral at bedtime  influenza   Vaccine 0.5 milliLiter(s) IntraMuscular once  insulin regular Infusion 4 Unit(s)/Hr (4 mL/Hr) IV Continuous <Continuous>  ipratropium 17 MICROgram(s) HFA Inhaler 1 Puff(s) Inhalation every 6 hours  methylPREDNISolone sodium succinate Injectable 80 milliGRAM(s) IV Push every 8 hours  montelukast 10 milliGRAM(s) Oral at bedtime  multivitamin/minerals 1 Tablet(s) Oral daily  mupirocin 2% Ointment 1 Application(s) Topical two times a day  norepinephrine Infusion 0.05 MICROgram(s)/kG/Min (3.534 mL/Hr) IV Continuous <Continuous>  oseltamivir 30 milliGRAM(s) Oral <User Schedule>  pantoprazole   Suspension 40 milliGRAM(s) Oral daily  sodium bicarbonate 650 milliGRAM(s) Oral every 8 hours  tacrolimus 0.5 milliGRAM(s) Oral every 12 hours    MEDICATIONS  (PRN):      ALLERGIES:  Allergies    No Known Allergies    Intolerances        LABS:                        7.6    8.60  )-----------( 51       ( 07 Feb 2020 04:45 )             23.3     02-07    140  |  103  |  97<HH>  ----------------------------<  110<H>  4.2   |  21  |  4.6<HH>    Ca    7.4<L>      07 Feb 2020 04:45  Phos  5.9     02-06  Mg     2.4     02-07    TPro  4.5<L>  /  Alb  2.4<L>  /  TBili  0.4  /  DBili  x   /  AST  17  /  ALT  17  /  AlkPhos  147<H>  02-07    PT/INR - ( 06 Feb 2020 04:30 )   PT: 20.80 sec;   INR: 1.81 ratio         PTT - ( 06 Feb 2020 04:30 )  PTT:26.6 sec    CAPILLARY BLOOD GLUCOSE  169 (05 Feb 2020 16:09)      POCT Blood Glucose.: 230 mg/dL (07 Feb 2020 08:48)    < from: [facility] Duplex Lower Ext Vein Scan, Bilat (02.06.20 @ 20:00) >  Impression:    Venous thrombosis left great saphenous vein extending to left saphenofemoral junction.    No evidence of deep venous thrombosis or superficial thrombophlebitis in right lower extremity    < end of copied text >  Cytopathology - Non Gyn Report:   ACCESSION No:  [id number]    DONI PATRICK                           1        Cytopathology Report            Specimen(s) Submitted  BAL ( L )      Clinical History  Pneumonia      Gross Description  The specimen is received fresh labeled with the patient's name  and consists of 25 ml of red fluid. One monolayer slide (Thin  Prep) is prepared and stained with Papanicolaou stain.      Final Diagnosis  BRONCHOALVEOLAR LAVAGE    ATYPICAL FINDINGS.  Rare atypical cells, few ciliated bronchial cells, alveolar  macrophages and inflammatory cells, mainly neutrophils.    Screened by: Yesica AGUILAR(ASCP)  Verified by: Mary Jane Canada M.D.  (Electronic Signature)  Reported on: 02/04/20 17:51 EST, [address] [place] [address]  Phone: [phone]   Fax: [phone]  Cytology technical processing performed at [facility],  [address] Floor, [place] [address]  _________________________________________________________________ (02.03.20 @ 09:20)    Culture Results:   Normal Respiratory Nikki present (02.03.20 @ 15:00)

## 2020-02-07 NOTE — PROGRESS NOTE ADULT - SUBJECTIVE AND OBJECTIVE BOX
ELDER, DONI  74y  Female  HPI:  75y/o F w/ hx of asthma, COPD not on home O2, autoimmune hepatitis on prednisone and tacrolimus, heterozygous prothrombin gene mutation with hx of PE and DVT on coumadin with recent hospitalization in January 2020 with a diagnosis of pneumonia presents for worsening SOB, hemoptysis and cough. Everything started yesterday night when patient was in bed, started to feel shortness of breath and had many episodes of hemoptysis with clots, she also had substernal chest pain non radiating, moderate in intensity that worsens on coughing. She denies fever but endorses chills. She also admits for lightheadedness that occurred yesterday, no HA, abd pain, dysuria or paresthesias. She had 2 loose bowel movements since yesterday with some blood in the stools that she attributes to her hemorrhoids. She stopped Coumadin couple of days ago since her INR was supratherapeutic. She is from [facility] short term and also mentions that her  and another family member have the flu and she was exposed to them. She did not have the flu shot this season.  In ED, temp was 100.1 rectally, tachycardic ( sinus) , she was saturating to 70s on non rebreather and her SaO2 went up to 95%. ABG showing respiratory alkalosis initially and then corrected after BIPAP placement and correction of RR.  CTA chest showing no PE but showing interval development of multifocal bilateral groundglass opacities as well as new moderate to severe bronchiectasis in the right lung. Findings are concerning for an acute infectious/inflammatory process. (31 Jan 2020 14:36)    MEDICATIONS  (STANDING):  ALBUTerol    90 MICROgram(s) HFA Inhaler 1 Puff(s) Inhalation every 6 hours  calcium carbonate    500 mG (Tums) Chewable 2 Tablet(s) Chew every 8 hours  chlorhexidine 0.12% Liquid 15 milliLiter(s) Oral Mucosa every 12 hours  chlorhexidine 4% Liquid 1 Application(s) Topical <User Schedule>  cyanocobalamin 1000 MICROGram(s) Oral daily  dexMEDEtomidine Infusion 0.5 MICROgram(s)/kG/Hr (10.188 mL/Hr) IV Continuous <Continuous>  gabapentin 300 milliGRAM(s) Oral at bedtime  influenza   Vaccine 0.5 milliLiter(s) IntraMuscular once  insulin regular Infusion 4 Unit(s)/Hr (4 mL/Hr) IV Continuous <Continuous>  ipratropium 17 MICROgram(s) HFA Inhaler 1 Puff(s) Inhalation every 6 hours  methylPREDNISolone sodium succinate Injectable 80 milliGRAM(s) IV Push every 8 hours  midazolam Injectable 2 milliGRAM(s) IV Push once  montelukast 10 milliGRAM(s) Oral at bedtime  multivitamin/minerals 1 Tablet(s) Oral daily  mupirocin 2% Ointment 1 Application(s) Topical two times a day  norepinephrine Infusion 0.05 MICROgram(s)/kG/Min (3.534 mL/Hr) IV Continuous <Continuous>  oseltamivir 30 milliGRAM(s) Oral <User Schedule>  pantoprazole   Suspension 40 milliGRAM(s) Oral daily  sodium bicarbonate 650 milliGRAM(s) Oral every 8 hours  tacrolimus 0.5 milliGRAM(s) Oral every 12 hours    MEDICATIONS  (PRN):    INTERVAL EVENTS:    T(C): 35 (02-07-20 @ 20:00), Max: 36.3 (02-07-20 @ 00:00)  HR: 82 (02-07-20 @ 22:30) (60 - 116)  BP: 100/45 (02-07-20 @ 10:00) (100/45 - 100/45)  RR: 27 (02-07-20 @ 22:30) (19 - 43)  SpO2: 100% (02-07-20 @ 22:30) (82% - 100%)  Wt(kg): --Vital Signs Last 24 Hrs  T(C): 35 (07 Feb 2020 20:00), Max: 36.3 (07 Feb 2020 00:00)  T(F): 95 (07 Feb 2020 20:00), Max: 97.3 (07 Feb 2020 00:00)  HR: 82 (07 Feb 2020 22:30) (60 - 116)  BP: 100/45 (07 Feb 2020 10:00) (100/45 - 100/45)  BP(mean): 67 (07 Feb 2020 10:00) (67 - 67)  RR: 27 (07 Feb 2020 22:30) (19 - 43)  SpO2: 100% (07 Feb 2020 22:30) (82% - 100%)    PHYSICAL EXAM:  GENERAL:   NECK: Supple, No JVD, Normal thyroid  CHEST/LUNG: Clear; No crackles or wheezing  HEART: S1, S2, Regular rate and rhythm;   ABDOMEN: Soft, Nontender, Nondistended; Bowel sounds present  EXTREMITIES: No clubbing, cyanosis, or edema  SKIN: No rashes or lesions    LABS:    RADIOLOGY & ADDITIONAL TESTS: ELDER, DONI  74y  Female  HPI:  75y/o F w/ hx of asthma, COPD not on home O2, autoimmune hepatitis on prednisone and tacrolimus, heterozygous prothrombin gene mutation with hx of PE and DVT on coumadin with recent hospitalization in January 2020 with a diagnosis of pneumonia presents for worsening SOB, hemoptysis and cough. Everything started yesterday night when patient was in bed, started to feel shortness of breath and had many episodes of hemoptysis with clots, she also had substernal chest pain non radiating, moderate in intensity that worsens on coughing. She denies fever but endorses chills. She also admits for lightheadedness that occurred yesterday, no HA, abd pain, dysuria or paresthesias. She had 2 loose bowel movements since yesterday with some blood in the stools that she attributes to her hemorrhoids. She stopped Coumadin couple of days ago since her INR was supratherapeutic. She is from [facility] short term and also mentions that her  and another family member have the flu and she was exposed to them. She did not have the flu shot this season.  In ED, temp was 100.1 rectally, tachycardic ( sinus) , she was saturating to 70s on non rebreather and her SaO2 went up to 95%. ABG showing respiratory alkalosis initially and then corrected after BIPAP placement and correction of RR.  CTA chest showing no PE but showing interval development of multifocal bilateral groundglass opacities as well as new moderate to severe bronchiectasis in the right lung. Findings are concerning for an acute infectious/inflammatory process. (31 Jan 2020 14:36)    MEDICATIONS  (STANDING):  ALBUTerol    90 MICROgram(s) HFA Inhaler 1 Puff(s) Inhalation every 6 hours  calcium carbonate    500 mG (Tums) Chewable 2 Tablet(s) Chew every 8 hours  chlorhexidine 0.12% Liquid 15 milliLiter(s) Oral Mucosa every 12 hours  chlorhexidine 4% Liquid 1 Application(s) Topical <User Schedule>  cyanocobalamin 1000 MICROGram(s) Oral daily  dexMEDEtomidine Infusion 0.5 MICROgram(s)/kG/Hr (10.188 mL/Hr) IV Continuous <Continuous>  gabapentin 300 milliGRAM(s) Oral at bedtime  influenza   Vaccine 0.5 milliLiter(s) IntraMuscular once  insulin regular Infusion 4 Unit(s)/Hr (4 mL/Hr) IV Continuous <Continuous>  ipratropium 17 MICROgram(s) HFA Inhaler 1 Puff(s) Inhalation every 6 hours  methylPREDNISolone sodium succinate Injectable 80 milliGRAM(s) IV Push every 8 hours  midazolam Injectable 2 milliGRAM(s) IV Push once  montelukast 10 milliGRAM(s) Oral at bedtime  multivitamin/minerals 1 Tablet(s) Oral daily  mupirocin 2% Ointment 1 Application(s) Topical two times a day  norepinephrine Infusion 0.05 MICROgram(s)/kG/Min (3.534 mL/Hr) IV Continuous <Continuous>  oseltamivir 30 milliGRAM(s) Oral <User Schedule>  pantoprazole   Suspension 40 milliGRAM(s) Oral daily  sodium bicarbonate 650 milliGRAM(s) Oral every 8 hours  tacrolimus 0.5 milliGRAM(s) Oral every 12 hours    MEDICATIONS  (PRN):    INTERVAL EVENTS: Patient seen earlier today,  at bedside. Patient took a turn for the worse yesterday afternoon, desaturating, requiring increased O2 and pressure support    T(C): 35 (02-07-20 @ 20:00), Max: 36.3 (02-07-20 @ 00:00)  HR: 82 (02-07-20 @ 22:30) (60 - 116)  BP: 100/45 (02-07-20 @ 10:00) (100/45 - 100/45)  RR: 27 (02-07-20 @ 22:30) (19 - 43)  SpO2: 100% (02-07-20 @ 22:30) (82% - 100%)  Wt(kg): --Vital Signs Last 24 Hrs  T(C): 35 (07 Feb 2020 20:00), Max: 36.3 (07 Feb 2020 00:00)  T(F): 95 (07 Feb 2020 20:00), Max: 97.3 (07 Feb 2020 00:00)  HR: 82 (07 Feb 2020 22:30) (60 - 116)  BP: 100/45 (07 Feb 2020 10:00) (100/45 - 100/45)  BP(mean): 67 (07 Feb 2020 10:00) (67 - 67)  RR: 27 (07 Feb 2020 22:30) (19 - 43)  SpO2: 100% (07 Feb 2020 22:30) (82% - 100%)    PHYSICAL EXAM:  GENERAL: NAD, vented, sedated  NECK: Supple, No JVD  CHEST/LUNG: Coarse BS  HEART: S1, S2, Regular rate and rhythm  ABDOMEN: Soft, Nontender, Bowel sounds present  EXTREMITIES: ++ edema  SKIN: multiple lesions and skin tears    LABS:                        7.6    8.60  )-----------( 51       ( 07 Feb 2020 04:45 )             23.3             02-07    140  |  103  |  97<HH>  ----------------------------<  110<H>  4.2   |  21  |  4.6<HH>    Ca    7.4<L>      07 Feb 2020 04:45  Phos  5.9     02-06  Mg     2.4     02-07    TPro  4.5<L>  /  Alb  2.4<L>  /  TBili  0.4  /  DBili  x   /  AST  17  /  ALT  17  /  AlkPhos  147<H>  02-07    LIVER FUNCTIONS - ( 07 Feb 2020 04:45 )  Alb: 2.4 g/dL / Pro: 4.5 g/dL / ALK PHOS: 147 U/L / ALT: 17 U/L / AST: 17 U/L / GGT: x                 PT/INR - ( 06 Feb 2020 04:30 )   PT: 20.80 sec;   INR: 1.81 ratio         PTT - ( 06 Feb 2020 04:30 )  PTT:26.6 sec      ABG - ( 07 Feb 2020 21:30 )  pH, Arterial: 7.39  pH, Blood: x     /  pCO2: 40    /  pO2: 90    / HCO3: 24    / Base Excess: -0.8  /  SaO2: 96           RADIOLOGY & ADDITIONAL TESTS:  < from: Xray Chest 1 View- PORTABLE-Routine (02.07.20 @ 05:17) >  FINDINGS:    SUPPORT DEVICES: Endotracheal and enteric tubes are in stable positions.  Stable right internal jugular central venous catheter. Telemetry leads    CARDIAC/MEDIASTINUM/HILUM: Unchanged.    LUNG PARENCHYMA/PLEURA:Stable bilateral pleural plaques. Bilateral lung  opacities are slightly less prominent. No pneumothorax.    SKELETON/SOFT TISSUES: Unchanged.    IMPRESSION:    Slightly less prominent bilateral lung opacities.      < end of copied text >  < from: Xray Chest 1 View- PORTABLE-Routine (02.07.20 @ 05:17) >  FINDINGS:    SUPPORT DEVICES: Endotracheal and enteric tubes are in stable positions.  Stable right internal jugular central venous catheter. Telemetry leads    CARDIAC/MEDIASTINUM/HILUM: Unchanged.    LUNG PARENCHYMA/PLEURA:Stable bilateral pleural plaques. Bilateral lung  opacities are slightly less prominent. No pneumothorax.    SKELETON/SOFT TISSUES: Unchanged.    IMPRESSION:    Slightly less prominent bilateral lung opacities.      < end of copied text >  < from: Xray Chest 1 View- PORTABLE-Routine (02.07.20 @ 05:17) >  FINDINGS:    SUPPORT DEVICES: Endotracheal and enteric tubes are in stable positions.  Stable right internal jugular central venous catheter. Telemetry leads    CARDIAC/MEDIASTINUM/HILUM: Unchanged.    LUNG PARENCHYMA/PLEURA:Stable bilateral pleural plaques. Bilateral lung  opacities are slightly less prominent. No pneumothorax.    SKELETON/SOFT TISSUES: Unchanged.    IMPRESSION:    Slightly less prominent bilateral lung opacities.      < end of copied text >      < from: VA Duplex Lower Ext Vein Scan, Bilat (02.06.20 @ 20:00) >  No evidence of deep venous thrombosis or superficial thrombophlebitis in right lower extremity    < end of copied text >

## 2020-02-07 NOTE — PROGRESS NOTE ADULT - ASSESSMENT
75 y/o female with pmhx of asthma, HTN,  autoimmune hepatitis, heterozygous prothrombin gene mutation with hx of PE and DVT on coumadin admitted for SOB     Acute hypoxic resp failure with hypoxia due to influenza and HCAP, Septic shock in immunocompromised patient  - new PE? given thrombosis  - remains intubated, sedated  - remains on IV Solu-medrol  - Vancomycin, Cefepime and Levaquin discontinued  - to complete Oseltamivir 75 mg BID for 10 days due to immunocompromised state  - continue vent support with PEEP decreased down to 10, Duoneb q6h and q4h PRN  - ID and pulm f/u appreciated    REJI   - holding Lasix and home BP medications  - creatinine rising  - renal following  - urine output about 10 cc/hr  - ? need for HMD    Autoimmune hepatitis  - On prednisone 60 mg PO qd and Tacrolimus at home  - now on Solu-medrol 60 mg iv BID  trending downward  - remains Tacrolimus 0.5 q12h    Recent CMV infection in January 2020:  - pending CMV PCR    HTN  - hypotensive on pressors  - medications held    Heterozygous prothrombin gene mutation with hx of PE and DVT on coumadin:  - Coumadin had been held for suspected alveolar hemorrhage   - ? GFF    Hx of asthma  - on Montelukast qd    Diet: tolerating enteral feedings  GI PPx: Protonix 40 mg PO qd  DVT PPx: sequentials  Activity: bedrest  Dispo: readmitted to hospital from  rehab at [facility], ICU for now  Code Status: FULL

## 2020-02-07 NOTE — PROGRESS NOTE ADULT - SUBJECTIVE AND OBJECTIVE BOX
seen and examined   chart reviewed   intubated/ventilated         PAST HISTORY  --------------------------------------------------------------------------------  No significant changes to PMH, PSH, FHx, SHx, unless otherwise noted    ALLERGIES & MEDICATIONS  --------------------------------------------------------------------------------  Allergies    No Known Allergies    Intolerances      Standing Inpatient Medications  ALBUTerol    90 MICROgram(s) HFA Inhaler 1 Puff(s) Inhalation every 6 hours  calcium carbonate    500 mG (Tums) Chewable 2 Tablet(s) Chew every 8 hours  chlorhexidine 0.12% Liquid 15 milliLiter(s) Oral Mucosa every 12 hours  chlorhexidine 4% Liquid 1 Application(s) Topical <User Schedule>  cyanocobalamin 1000 MICROGram(s) Oral daily  dexMEDEtomidine Infusion 0.5 MICROgram(s)/kG/Hr IV Continuous <Continuous>  gabapentin 300 milliGRAM(s) Oral at bedtime  influenza   Vaccine 0.5 milliLiter(s) IntraMuscular once  insulin regular Infusion 4 Unit(s)/Hr IV Continuous <Continuous>  ipratropium 17 MICROgram(s) HFA Inhaler 1 Puff(s) Inhalation every 6 hours  methylPREDNISolone sodium succinate Injectable 80 milliGRAM(s) IV Push every 8 hours  montelukast 10 milliGRAM(s) Oral at bedtime  multivitamin/minerals 1 Tablet(s) Oral daily  mupirocin 2% Ointment 1 Application(s) Topical two times a day  norepinephrine Infusion 0.05 MICROgram(s)/kG/Min IV Continuous <Continuous>  oseltamivir 30 milliGRAM(s) Oral <User Schedule>  pantoprazole   Suspension 40 milliGRAM(s) Oral daily  sodium bicarbonate  Infusion 0.092 mEq/kG/Hr IV Continuous <Continuous>  tacrolimus 0.5 milliGRAM(s) Oral every 12 hours    PRN Inpatient Medications      VITALS/PHYSICAL EXAM  --------------------------------------------------------------------------------  T(C): 36.1 (02-07-20 @ 04:00), Max: 36.3 (02-07-20 @ 00:00)  HR: 69 (02-07-20 @ 07:35) (62 - 118)  BP: --  RR: 29 (02-07-20 @ 07:30) (25 - 38)  SpO2: 98% (02-07-20 @ 07:35) (72% - 100%)  Wt(kg): --        02-06-20 @ 07:01  -  02-07-20 @ 07:00  --------------------------------------------------------  IN: 2203 mL / OUT: 172 mL / NET: 2031 mL      Physical Exam:  	Gen: intubated/ventilated   	Pulm: B/L xiomara   	CV:  S1S2; no rub  	Abd: +distended  	LE: no edema        	        LABS/STUDIES  --------------------------------------------------------------------------------              7.6    8.60  >-----------<  51       [02-07-20 @ 04:45]              23.3     140  |  103  |  97  ----------------------------<  110      [02-07-20 @ 04:45]  4.2   |  21  |  4.6        Ca     7.4     [02-07-20 @ 04:45]      Mg     2.4     [02-07-20 @ 04:45]      Phos  5.9     [02-06-20 @ 19:40]    TPro  4.5  /  Alb  2.4  /  TBili  0.4  /  DBili  x   /  AST  17  /  ALT  17  /  AlkPhos  147  [02-07-20 @ 04:45]    PT/INR: PT 20.80, INR 1.81       [02-06-20 @ 04:30]  PTT: 26.6       [02-06-20 @ 04:30]          [02-06-20 @ 19:40]    Creatinine Trend:  SCr 4.6 [02-07 @ 04:45]  SCr 4.2 [02-06 @ 20:30]  SCr 4.4 [02-06 @ 19:40]  SCr 3.9 [02-06 @ 18:20]  SCr 3.6 [02-06 @ 04:30]    Urinalysis - [01-31-20 @ 17:11]      Color Yellow / Appearance Slightly Turbid / SG 1.025 / pH 6.0      Gluc 100 mg/dL / Ketone Small  / Bili Negative / Urobili <2 mg/dL       Blood Small / Protein 100 mg/dL / Leuk Est Negative / Nitrite Negative      RBC 1 / WBC 9 / Hyaline 14 / Gran  / Sq Epi  / Non Sq Epi 8 / Bacteria Negative      HbA1c 6.9      [03-12-18 @ 04:54]  TSH 0.57      [02-01-20 @ 04:46]  Lipid: chol 203, , HDL 74,       [02-01-20 @ 04:46]      Rheumatoid Factor 59      [02-01-20 @ 04:46]  anti-GBM <1.0      [02-01-20 @ 04:46]  Free Light Chains: kappa 4.52, lambda 3.30, ratio = 1.37      [02-01 @ 04:46]  Immunofixation Serum:   No Monoclonal Band Identified    Reference Range: None Detected      [02-01-20 @ 04:46]  SPEP Interpretation: Normal Electrophoresis Pattern      [02-01-20 @ 04:46]

## 2020-02-08 NOTE — PROGRESS NOTE ADULT - SUBJECTIVE AND OBJECTIVE BOX
Patient is a 74y old  Female who presents with a chief complaint of SOB and desaturation (08 Feb 2020 12:51)      OVERNIGHT EVENTS: remaiend intubated and sedated, no fever, cont to have diarrhea     SUBJECTIVE / INTERVAL HPI: Patient seen and examined at bedside.     VITAL SIGNS:  Vital Signs Last 24 Hrs  T(C): 37.1 (08 Feb 2020 20:00), Max: 37.8 (08 Feb 2020 04:00)  T(F): 98.8 (08 Feb 2020 20:00), Max: 100.1 (08 Feb 2020 04:00)  HR: 85 (08 Feb 2020 23:40) (80 - 126)  BP: --  BP(mean): --  RR: 25 (08 Feb 2020 23:00) (25 - 39)  SpO2: 98% (08 Feb 2020 23:40) (94% - 100%)    PHYSICAL EXAM:    General: WDWN  HEENT: NC/AT; PERRL, clear conjunctiva  Neck: supple  Cardiovascular: +S1/S2; RRR  Respiratory: CTA b/l; no W/R/R  Gastrointestinal: soft, NT/ND; +BSx4  Extremities: WWP; 2+ peripheral pulses; no edema   Neurological: sedated and intubated; no focal deficits    MEDICATIONS:  MEDICATIONS  (STANDING):  ALBUTerol    90 MICROgram(s) HFA Inhaler 1 Puff(s) Inhalation every 6 hours  calcium carbonate    500 mG (Tums) Chewable 2 Tablet(s) Chew every 8 hours  chlorhexidine 0.12% Liquid 15 milliLiter(s) Oral Mucosa every 12 hours  chlorhexidine 4% Liquid 1 Application(s) Topical <User Schedule>  cyanocobalamin 1000 MICROGram(s) Oral daily  dexMEDEtomidine Infusion 0.5 MICROgram(s)/kG/Hr (10.188 mL/Hr) IV Continuous <Continuous>  furosemide   Injectable 40 milliGRAM(s) IV Push every 12 hours  gabapentin 300 milliGRAM(s) Oral at bedtime  influenza   Vaccine 0.5 milliLiter(s) IntraMuscular once  insulin regular Infusion 4 Unit(s)/Hr (4 mL/Hr) IV Continuous <Continuous>  ipratropium 17 MICROgram(s) HFA Inhaler 1 Puff(s) Inhalation every 6 hours  methylPREDNISolone sodium succinate Injectable 80 milliGRAM(s) IV Push every 8 hours  midazolam Infusion 0.01 mG/kG/Hr (0.815 mL/Hr) IV Continuous <Continuous>  montelukast 10 milliGRAM(s) Oral at bedtime  multivitamin/minerals 1 Tablet(s) Oral daily  mupirocin 2% Ointment 1 Application(s) Topical two times a day  norepinephrine Infusion 0.05 MICROgram(s)/kG/Min (3.534 mL/Hr) IV Continuous <Continuous>  oseltamivir 30 milliGRAM(s) Oral <User Schedule>  pantoprazole   Suspension 40 milliGRAM(s) Oral daily  propofol Infusion 10 MICROgram(s)/kG/Min (4.89 mL/Hr) IV Continuous <Continuous>  sodium bicarbonate 650 milliGRAM(s) Oral every 8 hours  tacrolimus 0.5 milliGRAM(s) Oral every 12 hours    MEDICATIONS  (PRN):      ALLERGIES:  Allergies    No Known Allergies    Intolerances        LABS:                        8.3    22.42 )-----------( 101      ( 08 Feb 2020 20:43 )             25.5     02-08    138  |  100  |  111<HH>  ----------------------------<  143<H>  4.6   |  20  |  4.6<HH>    Ca    7.4<L>      08 Feb 2020 04:00  Mg     2.4     02-07    TPro  4.5<L>  /  Alb  2.4<L>  /  TBili  0.4  /  DBili  x   /  AST  17  /  ALT  17  /  AlkPhos  147<H>  02-07    PT/INR - ( 08 Feb 2020 04:00 )   PT: 19.20 sec;   INR: 1.67 ratio             CAPILLARY BLOOD GLUCOSE      POCT Blood Glucose.: 158 mg/dL (08 Feb 2020 22:54)  < from: [facility] Duplex Lower Ext Vein Scan, Bilat (02.06.20 @ 20:00) >  Impression:    Venous thrombosis left great saphenous vein extending to left saphenofemoral junction.    No evidence of deep venous thrombosis or superficial thrombophlebitis in right lower extremity    < end of copied text >      Culture - Bronchial (02.03.20 @ 15:00)    Gram Stain:   No polymorphonuclear cells seen per low power field  No squamous epithelial cells per low power field  No organisms seen    Specimen Source: .Bronchial None    Culture Results:   Normal Respiratory Nikki present        PLAN:

## 2020-02-08 NOTE — PROGRESS NOTE ADULT - SUBJECTIVE AND OBJECTIVE BOX
Med  Attending Progress Daily Note     08 Feb 2020 12:12  remains intubated on high oxygen requirmat   Chart reviewed, patient examined. Pertinent results reviewed.  specialist f/u reviewed  HD#8    He has history of Prothrombin gene mutation  Autoimmune hepatitis  Other chronic pulmonary embolism without acute cor pulmonale  Essential hypertension  Autoimmune hepatitis treated with steroids  Asthma  DVT (deep venous thrombosis)    Interval event for past 24 hr:  DONI PATRICK  74y had no event.   Current Complains:  DONI PATRICK has no new complains  HPI:  75y/o F w/ hx of asthma, COPD not on home O2, autoimmune hepatitis on prednisone and tacrolimus, heterozygous prothrombin gene mutation with hx of PE and DVT on coumadin with recent hospitalization in January 2020 with a diagnosis of pneumonia presented for worsening SOB, hemoptysis and cough. Everything started the night PTA when patient was in bed, started to feel shortness of breath and had many episodes of hemoptysis with clots, she also had substernal chest pain non radiating, moderate in intensity that worsens on coughing. She denies fever but endorses chills. She also admits for lightheadedness that occurred yesterday, no HA, abd pain, dysuria or paresthesias. She had 2 loose bowel movements since day PTA with some blood in the stools that she attributes to her hemorrhoids. She stopped Coumadin couple of days PTA since her INR was supratherapeutic. She is from [facility] term and also mentions that her  and another family member have the flu and she was exposed to them. She did not have the flu shot this season.  In ED, temp was 100.1 rectally, tachycardic ( sinus) , she was saturating to 70s on non rebreather and her SaO2 went up to 95%. ABG showing respiratory alkalosis initially and then corrected after BIPAP placement and correction of RR.  CTA chest showing no PE but showing interval development of multifocal bilateral groundglass opacities as well as new moderate to severe bronchiectasis in the right lung. Findings are concerning for an acute infectious/inflammatory process. (31 Jan 2020 14:36)  Her viral testing was POS, and she has been intubated since shortly after admission.    OBJECTIVE:  ICU Vital Signs Last 24 Hrs  T(C): 37.7 (08 Feb 2020 08:00), Max: 37.8 (08 Feb 2020 04:00)  T(F): 99.8 (08 Feb 2020 08:00), Max: 100.1 (08 Feb 2020 04:00)  HR: 102 (08 Feb 2020 11:00) (60 - 126)  BP: --  BP(mean): --  ABP: 142/56 (08 Feb 2020 11:00) (96/44 - 194/76)  ABP(mean): 88 (08 Feb 2020 11:00) (62 - 124)  RR: 30 (08 Feb 2020 11:00) (19 - 43)  SpO2: 100% (08 Feb 2020 11:00) (95% - 100%)-- on FiO2- 100%        I&O's Summary    07 Feb 2020 07:01  -  08 Feb 2020 07:00  --------------------------------------------------------  IN: 1236.6 mL / OUT: 1310 mL / NET: -73.4 mL    08 Feb 2020 07:01  -  08 Feb 2020 12:12  --------------------------------------------------------  IN: 71.4 mL / OUT: 250 mL / NET: -178.6 mL      I&O's Detail    07 Feb 2020 07:01  -  08 Feb 2020 07:00  --------------------------------------------------------  IN:    dexmedetomidine Infusion: 494.6 mL    insulin regular Infusion: 94 mL    midazolam Infusion: 18 mL    Peptamen A.F.: 480 mL    sodium bicarbonate  Infusion: 150 mL  Total IN: 1236.6 mL    OUT:    Indwelling Catheter - Urethral: 160 mL    Rectal Tube: 800 mL    Ureteral Catheter: 350 mL  Total OUT: 1310 mL    Total NET: -73.4 mL      08 Feb 2020 07:01  -  08 Feb 2020 12:12  --------------------------------------------------------  IN:    dexmedetomidine Infusion: 22 mL    insulin regular Infusion: 16 mL    midazolam Infusion: 4 mL    propofol Infusion: 29.4 mL  Total IN: 71.4 mL    OUT:    Indwelling Catheter - Urethral: 250 mL  Total OUT: 250 mL    Total NET: -178.6 mL          Mode: AC/ CMV (Assist Control/ Continuous Mandatory Ventilation)  RR (machine): 26  TV (machine): 450  FiO2: 100  PEEP: 10  ITime: 1  MAP: 20  PIP: 26    LABS:  ABG - ( 08 Feb 2020 04:56 )  pH, Arterial: 7.40  pH, Blood: x     /  pCO2: 37    /  pO2: 370   / HCO3: 23    / Base Excess: -1.8  /  SaO2: 100                             7.7    13.98 )-----------( 83       ( 08 Feb 2020 04:00 )             23.8     02-08    138  |  100  |  111<HH>  ----------------------------<  143<H>  4.6   |  20  |  4.6<HH>    Ca    7.4<L>      08 Feb 2020 04:00  Phos  5.9     02-06  Mg     2.4     02-07    TPro  4.5<L>  /  Alb  2.4<L>  /  TBili  0.4  /  DBili  x   /  AST  17  /  ALT  17  /  AlkPhos  147<H>  02-07    PT/INR - ( 08 Feb 2020 04:00 )   PT: 19.20 sec;   INR: 1.67 ratio               Home Medications:  acetaminophen 500 mg oral tablet: 2 tab(s) orally every 8 hours (20 Jan 2020 13:38)  amLODIPine 10 mg oral tablet: 1 tab(s) orally once a day (at bedtime) (20 Jan 2020 13:38)  budesonide 3 mg oral capsule, extended release: 1 cap(s) orally 2 times a day (05 Dec 2019 17:19)  ferrous sulfate 325 mg (65 mg elemental iron) oral delayed release tablet: 1 tab(s) orally once a day (05 Dec 2019 17:19)  furosemide 20 mg oral tablet: 1 tab(s) orally once a day (05 Dec 2019 17:19)  gabapentin 300 mg oral capsule: 1 cap(s) orally once a day (at bedtime) (20 Jan 2020 13:38)  ibuprofen 400 mg oral tablet: 1 tab(s) orally every 6 hours, As needed, Moderate Pain (4 - 6) (20 Jan 2020 13:38)  ipratropium-albuterol 0.5 mg-2.5 mg/3 mLinhalation solution: 3 milliliter(s) inhaled every 6 hours, As Needed (20 Jan 2020 13:40)  lidocaine 5% topical film: Apply topically to affected area once a day (20 Jan 2020 13:38)  Metoprolol Tartrate 50 mg oral tablet: 1 tab(s) orally once a day (05 Dec 2019 17:19)  montelukast 10 mg oral tablet: 1 tab(s) orally once a day (at bedtime) (05 Dec 2019 17:19)  Multiple Vitamins with Minerals oral tablet: 1 tab(s) orally once a day (20 Jan 2020 13:38)  oxyCODONE 10 mg oral tablet: 1 tab(s) orally every 6 hours, As needed, Severe Pain (7 - 10) (20 Jan 2020 13:38)  predniSONE 20 mg oral tablet: 3 tab(s) orally once a day (20 Jan 2020 13:38)  ProAir HFA 90 mcg/inh inhalation aerosol: 1 puff(s) inhaled every 4 hours, As Needed (20 Jan 2020 13:40)  risedronate 150 mg oral tablet: 1 tab(s) orally once a month (05 Dec 2019 17:19)  tacrolimus 0.5 mg oral capsule: 1 cap(s) orally every 12 hours (05 Dec 2019 17:19)  tiotropium 18 mcg inhalation capsule: 1 cap(s) inhaled once a day, As Needed (20 Jan 2020 13:40)  Vitamin B12 1000 mcg oral tablet: 1 tab(s) orally once a day (05 Dec 2019 17:19)  Vitamin C 1000 mg oral tablet: 1 tab(s) orally once a day (05 Dec 2019 17:19)  warfarin 4 mg oral tablet: 1 tab(s) orally once a day (at bedtime) (20 Jan 2020 13:38)    HOSPITAL MEDICATIONS:  MEDICATIONS  (STANDING):  ALBUTerol    90 MICROgram(s) HFA Inhaler 1 Puff(s) Inhalation every 6 hours  calcium carbonate    500 mG (Tums) Chewable 2 Tablet(s) Chew every 8 hours  chlorhexidine 0.12% Liquid 15 milliLiter(s) Oral Mucosa every 12 hours  chlorhexidine 4% Liquid 1 Application(s) Topical <User Schedule>  cyanocobalamin 1000 MICROGram(s) Oral daily  dexMEDEtomidine Infusion 0.5 MICROgram(s)/kG/Hr (10.188 mL/Hr) IV Continuous <Continuous>  furosemide   Injectable 40 milliGRAM(s) IV Push every 12 hours  gabapentin 300 milliGRAM(s) Oral at bedtime  influenza   Vaccine 0.5 milliLiter(s) IntraMuscular once  insulin regular Infusion 4 Unit(s)/Hr (4 mL/Hr) IV Continuous <Continuous>  ipratropium 17 MICROgram(s) HFA Inhaler 1 Puff(s) Inhalation every 6 hours  methylPREDNISolone sodium succinate Injectable 80 milliGRAM(s) IV Push every 8 hours  midazolam Infusion 0.01 mG/kG/Hr (0.815 mL/Hr) IV Continuous <Continuous>  montelukast 10 milliGRAM(s) Oral at bedtime  multivitamin/minerals 1 Tablet(s) Oral daily  mupirocin 2% Ointment 1 Application(s) Topical two times a day  norepinephrine Infusion 0.05 MICROgram(s)/kG/Min (3.534 mL/Hr) IV Continuous <Continuous>  oseltamivir 30 milliGRAM(s) Oral <User Schedule>  pantoprazole   Suspension 40 milliGRAM(s) Oral daily  propofol Infusion 10 MICROgram(s)/kG/Min (4.89 mL/Hr) IV Continuous <Continuous>  sodium bicarbonate 650 milliGRAM(s) Oral every 8 hours  tacrolimus 0.5 milliGRAM(s) Oral every 12 hours    MEDICATIONS  (PRN):      REVIEW OF SYSTEMS:      [ x ] Unable to assess ROS because intubated sedated    PHYSICAL EXAM:          CONSTITUTIONAL: Well-developed; well-nourished; intubated, sedated, no response to V/P stim.  	SKIN: warm, dry  	HEAD: Normocephalic; atraumatic.  	EYES: PERRL, EOM, no conj injection, sclera clear  	ENT: No nasal discharge; airway clear.  	NECK: Supple; non tender.  No midline ttp ctls  	CARD: RR, no MRG; 2+ RPs and DPs bilat, no carotid bruits, milds LE edema bilat.  	RESP: CTA  bilat good air movement No wheezes, rales or rhonchi.  	ABD: Soft, not tender, not distended, no CVA ttp no rebound or guarding, bowel sounds present  	EXT: Normal ROM.  No clubbing, cyanosis or edema. wraps over UE- atrophic/ecchymotic skin   	  	NEURO: sedated- no response        RADIOLOGY:  xray  < from: Xray Chest 1 View- PORTABLE-Routine (02.08.20 @ 06:14) >  Impression:      Cardiomegaly.    Bilateral pleural plaques and bilateral opacities.    Support tubes and lines as above.    No significant change from February7, 2020.    < end of copied text >    I spent 45 minutes of critical care time examining patient, reviewing vitals, labs, medications, imaging and discussing with the team goals of care to prevent life-threatening in this patient who is at high risk for deterioration or death due to:

## 2020-02-08 NOTE — CHART NOTE - NSCHARTNOTEFT_GEN_A_CORE
Registered Dietitian Follow-Up     Patient Profile Reviewed                           Yes [x]   No []     Nutrition History Previously Obtained        Yes []  No [x]--unable to obtain as no family at b/s      Pertinent Subjective Information: Pt still sedated and intubated, levophed order active in EMR but pt no longer receiving. Now receiving propofol 12.2mL/hr. Pt continued to have diarrhea so formula switched to peptamen AF, as per RN digsrinivasaield now in place but noticed decreased in total stool volume. C.diff sample sent today. As per MD progress note 2/7, noted pt required Nimbex, however RN reports it ended up not being given. TF regimen changed to bolus but pt requires continuous d/t intubation, d/w covering LIP. MAP: 88.     Pertinent Medical Interventions:  1. REJI--per Nephro: oATN in nature, possibly vancomycin toxicity. oliguric. reinsert henley, Cr stable, no acute indication for RRT at this time.  2. Left great saphenous vein thrombosis   --repeat LE duplex. if progression then need IVC   3. Acute hypoxic respiratory failure, remains intubated. propofol initiated d/t agitation   --Bronch results: neg culture, neg fungal, cytology positive for inflammatory cells, no organisms    Diet order: Peptamen AF @240mL q6H (1152kcal, 72g protein)      Anthropometrics:  - Ht. 64"  - Wt. 87.7kg (2/8)--?accuracy in CBW taken today as large discrepancy noted. wt changes likely with fluid shifts but will continue to monitor. will use lowest wt for estimated needs.  (2/4): 78.4kg   (2/3): 75.4kg   (2/2): 74.3kg   (2/1): 74.8kg   - %wt change  - BMI: 27.9 using lowest wt of 74.3kg   - IBW: 120#      Pertinent Lab Data: (2/8): WBC 13.98, RBC 2.88, H/H 7.7/23.8, , Cr 4.6, glucose 143, GFR 9  2/8: POCT: 150/126/191/204/203/159      Pertinent Meds: Tamiflu, abx, insulin, lasix, versed, propofol (12.2mL/hr = 322kcal/day), precedex, sodium bicarbonate, solumedrol, protonix, albuterol, cyanocobalamin, gabapentin, multivitamin/minerals, prograf      Physical Findings:  - Appearance: intubated/ventilated; 1+ generalized edema   - GI function: diarrhea. C.diff sample sent; +200mL output today 2/8   - Tubes: OGT   - Oral/Mouth cavity: NPO   - Skin: ecchymosis, L knee abrasion      Nutrition Requirements  Weight Used: 74.3kg lowest wt since admit. (continued from RD f/u note 2/5)      Estimated Energy Needs: 1536 kcal/day ([id number])   Estimated Protein Needs: 65-82 gm/day (1.2-1.5gm/kg IBW)--pt's age and impaired renal function considered (REJI)   Estimated Fluid Needs: per ICU team      Nutrient Intake: TF meeting 75% kcal and 88% protein needs      Previous Nutrition Diagnosis: Inadequate Energy Intake (ongoing)      Nutrition Intervention: enteral nutrition  Recommendations:  1. Adjust TF order to continuous feeds as pt remains intubated. Recommend Peptamen AF @40mL/hr x24hrs. TF at goal rate (including propofol) will provide 1474kcal, 72g protein, 778mL free H2O and 77% RDIs. Flushes per ICU team. Maintain aspiration precautions.   --hold TF if MAP <65. hold TF if pt requires Nimbex as feeding contraindicated when using paralytic.   2. Monitor serum electrolytes daily.      Goal/Expected Outcome: TF to provide % of estimated nutrient needs within 3 days      Indicator/Monitoring: RD to monitor diet order, energy intake, renal/glucose profiles, nutrition focused physical findings, body composition.

## 2020-02-08 NOTE — PROGRESS NOTE ADULT - SUBJECTIVE AND OBJECTIVE BOX
DARNELL, DONI  74y, Female    All available historical data reviewed    OVERNIGHT EVENTS:    none  ROS:  low grade fevers  FiO2 100  sedated, non responsive  off pressors  rectal tube with diarrhea  R IJ catheter with no erythema    VITALS:  T(F): 100.1, Max: 100.1 (02-08-20 @ 04:00)  HR: 86  BP: 100/45  RR: 28Vital Signs Last 24 Hrs  T(C): 37.8 (08 Feb 2020 04:00), Max: 37.8 (08 Feb 2020 04:00)  T(F): 100.1 (08 Feb 2020 04:00), Max: 100.1 (08 Feb 2020 04:00)  HR: 86 (08 Feb 2020 05:00) (60 - 126)  BP: 100/45 (07 Feb 2020 10:00) (100/45 - 100/45)  BP(mean): 67 (07 Feb 2020 10:00) (67 - 67)  RR: 28 (08 Feb 2020 05:00) (19 - 43)  SpO2: 100% (08 Feb 2020 05:00) (82% - 100%)    TESTS & MEASUREMENTS:                        7.7    13.98 )-----------( 83       ( 08 Feb 2020 04:00 )             23.8     02-08    138  |  100  |  111<HH>  ----------------------------<  143<H>  4.6   |  20  |  4.6<HH>    Ca    7.4<L>      08 Feb 2020 04:00  Phos  5.9     02-06  Mg     2.4     02-07    TPro  4.5<L>  /  Alb  2.4<L>  /  TBili  0.4  /  DBili  x   /  AST  17  /  ALT  17  /  AlkPhos  147<H>  02-07    LIVER FUNCTIONS - ( 07 Feb 2020 04:45 )  Alb: 2.4 g/dL / Pro: 4.5 g/dL / ALK PHOS: 147 U/L / ALT: 17 U/L / AST: 17 U/L / GGT: x             Culture - Acid Fast - Bronchial w/Smear (collected 02-03-20 @ 15:00)  Source: .Bronchial None  Preliminary Report (02-05-20 @ 15:04):    Culture is being performed.    Culture - Fungal, Bronchial (collected 02-03-20 @ 15:00)  Source: .Bronchial None  Preliminary Report (02-04-20 @ 08:06):    Testing in progress    Culture - Bronchial (collected 02-03-20 @ 15:00)  Source: .Bronchial None  Gram Stain (02-04-20 @ 05:52):    No polymorphonuclear cells seen per low power field    No squamous epithelial cells per low power field    No organisms seen  Final Report (02-05-20 @ 19:32):    Normal Respiratory Nikki present            RADIOLOGY & ADDITIONAL TESTS:  Personal review of radiological diagnostics performed  Echo and EKG results noted when applicable.     MEDICATIONS:  ALBUTerol    90 MICROgram(s) HFA Inhaler 1 Puff(s) Inhalation every 6 hours  calcium carbonate    500 mG (Tums) Chewable 2 Tablet(s) Chew every 8 hours  chlorhexidine 0.12% Liquid 15 milliLiter(s) Oral Mucosa every 12 hours  chlorhexidine 4% Liquid 1 Application(s) Topical <User Schedule>  cyanocobalamin 1000 MICROGram(s) Oral daily  dexMEDEtomidine Infusion 0.5 MICROgram(s)/kG/Hr IV Continuous <Continuous>  gabapentin 300 milliGRAM(s) Oral at bedtime  influenza   Vaccine 0.5 milliLiter(s) IntraMuscular once  insulin regular Infusion 4 Unit(s)/Hr IV Continuous <Continuous>  ipratropium 17 MICROgram(s) HFA Inhaler 1 Puff(s) Inhalation every 6 hours  methylPREDNISolone sodium succinate Injectable 80 milliGRAM(s) IV Push every 8 hours  midazolam Infusion 0.01 mG/kG/Hr IV Continuous <Continuous>  montelukast 10 milliGRAM(s) Oral at bedtime  multivitamin/minerals 1 Tablet(s) Oral daily  mupirocin 2% Ointment 1 Application(s) Topical two times a day  norepinephrine Infusion 0.05 MICROgram(s)/kG/Min IV Continuous <Continuous>  oseltamivir 30 milliGRAM(s) Oral <User Schedule>  pantoprazole   Suspension 40 milliGRAM(s) Oral daily  sodium bicarbonate 650 milliGRAM(s) Oral every 8 hours  tacrolimus 0.5 milliGRAM(s) Oral every 12 hours      ANTIBIOTICS:  oseltamivir 30 milliGRAM(s) Oral <User Schedule>

## 2020-02-08 NOTE — PROGRESS NOTE ADULT - ASSESSMENT
IMPRESSION:    Acute hypoxic respiratory failure  DAH  Influenza A treated   ARDS  REJI oliguric   HO Autoimmune hepatitis on tacrolimus and chronic steroids   h/o hypercoagulable state/ vte was on coumadin   Left great saphenous vein thrombosis     PLAN:    CNS:  SAT     HEENT: ET care.  Oral care     PULMONARY:  HOB @ 45 degrees. Solumedrol 80 mg q8h for now. Wean O2  PEEP 10.   FU PPl and Driving Pressure.     CARDIOVASCULAR: I=O.      GI: GI prophylaxis.  Continue feeding.       RENAL:  Follow up lytes. Replete as needed.  FU with Renal might need RRT     INFECTIOUS DISEASE: Follow up cultures.  ATB as per ID     HEMATOLOGICAL:  DVT prophylaxis.  Repeat LE Duplex in AM.  DW IR.  If progression, then IVC    ENDOCRINE:  Follow up FS.  Insulin protocol if needed.    MUSCULOSKELETAL: Bed rest     Code status: full     Prognosis: Poor prognosis     Continue MICU monitoring for now.

## 2020-02-08 NOTE — PROGRESS NOTE ADULT - ASSESSMENT
A 74y female with PMH of asthma, COPD not on home O2, autoimmune hepatitis on prednisone and tacrolimus, heterozygous prothrombin gene mutation with hx of PE and DVT on coumadin presents to the hospital complaining of cough, hemoptysis, SOB and desaturation to 70s.     #ARDS  1-alveolar hemorrhage   2-flu +ve with post viral pneumonia,   3-possibel PE: cant start AC due to alveolar hemorrhage      - intubated and sedated  - Solu-medrol 80mg iV q8  - s/p DDAVP 3 doses   - s/p cefepime and Levaquin (competed 7 days yesterday) - off Vanc >> kidneys toxicity  - oseltamivir 30 mg BID for D8/10   - C/w Duoneb   - Bronch results - neg culture, neg fungal, cytology positive for inflammatory cells, no organisms    #REJI oliguric likely ATN with Vanc toxicity   - acidosis resolved, UO improved today,   - Cr remained elevated 4.6  - no need RRT for now  - f/u FK level  - Vanc d/mark   -off Bicarb drip, c/w PO q8h  -Nephro recom appreciated       #clots in L Saphenous vein at the femoral Junction with possible PE!  -f/u repeat Duplex   -might need IVC filter depends on repeat Duplex  -not candidate for AC at this time due to alveolar hemorrhage     #Diarrhea: day 3 of diarrhea, has been of laxative for > 48 hours, WBC today 22 from 13  -send C.diff ??    #Immunosuppressed: autoimmune hepatitis on prednisone and tacrolimus  - continue with steroid and tacrolimus    # Autoimmune hepatitis  - prednisone 60 mg PO qd at home now on methylprednisolone 250mg iv Q6H  - C/w tacrolimus 0.5 q12h, level was ok, ok to keep it for now    # Recent CMV infection in January 2020  - CMV PCR was negative    # HTN  - was on amlodipine 10 mg qd and lopressor 50 mg PO qd holding now due to hypotension  - Monitor BPs    # heterozygous prothrombin gene mutation with hx of PE and DVT on coumadin  - holding coumadin for now due to suspected alveolar hemorrhage   - monitor coags    #0.5 cm of GB polyp  -needs f/u US in 12 months     # Hx of asthma  - C/w montelukast qd    # GI PPx: Protonix 40 mg PO qd  # DVT PPx: sequentials to right leg only  # Activity: bedrest   # Dispo: ICU for now  # Code Status: FULL

## 2020-02-08 NOTE — PROGRESS NOTE ADULT - SUBJECTIVE AND OBJECTIVE BOX
ICU  Attending Progress Daily Note     08 Feb 2020 12:12  remains intubated on high oxygen requirmat       He has history of Prothrombin gene mutation  Autoimmune hepatitis  Other chronic pulmonary embolism without acute cor pulmonale  Essential hypertension  Autoimmune hepatitis treated with steroids  Asthma  DVT (deep venous thrombosis)    Interval event for past 24 hr:  DONI PATRICK  74y had no event.   Current Complains:  DONI PATRICK has no new complains  HPI:  73y/o F w/ hx of asthma, COPD not on home O2, autoimmune hepatitis on prednisone and tacrolimus, heterozygous prothrombin gene mutation with hx of PE and DVT on coumadin with recent hospitalization in January 2020 with a diagnosis of pneumonia presents for worsening SOB, hemoptysis and cough. Everything started yesterday night when patient was in bed, started to feel shortness of breath and had many episodes of hemoptysis with clots, she also had substernal chest pain non radiating, moderate in intensity that worsens on coughing. She denies fever but endorses chills. She also admits for lightheadedness that occurred yesterday, no HA, abd pain, dysuria or paresthesias. She had 2 loose bowel movements since yesterday with some blood in the stools that she attributes to her hemorrhoids. She stopped Coumadin couple of days ago since her INR was supratherapeutic. She is from [facility], [place], operated by [facility] and also mentions that her  and another family member have the flu and she was exposed to them. She did not have the flu shot this season.  In ED, temp was 100.1 rectally, tachycardic ( sinus) , she was saturating to 70s on non rebreather and her SaO2 went up to 95%. ABG showing respiratory alkalosis initially and then corrected after BIPAP placement and correction of RR.  CTA chest showing no PE but showing interval development of multifocal bilateral groundglass opacities as well as new moderate to severe bronchiectasis in the right lung. Findings are concerning for an acute infectious/inflammatory process. (31 Jan 2020 14:36)    OBJECTIVE:  ICU Vital Signs Last 24 Hrs  T(C): 37.7 (08 Feb 2020 08:00), Max: 37.8 (08 Feb 2020 04:00)  T(F): 99.8 (08 Feb 2020 08:00), Max: 100.1 (08 Feb 2020 04:00)  HR: 102 (08 Feb 2020 11:00) (60 - 126)  BP: --  BP(mean): --  ABP: 142/56 (08 Feb 2020 11:00) (96/44 - 194/76)  ABP(mean): 88 (08 Feb 2020 11:00) (62 - 124)  RR: 30 (08 Feb 2020 11:00) (19 - 43)  SpO2: 100% (08 Feb 2020 11:00) (95% - 100%)    I&O's Summary    07 Feb 2020 07:01  -  08 Feb 2020 07:00  --------------------------------------------------------  IN: 1236.6 mL / OUT: 1310 mL / NET: -73.4 mL    08 Feb 2020 07:01  -  08 Feb 2020 12:12  --------------------------------------------------------  IN: 71.4 mL / OUT: 250 mL / NET: -178.6 mL      I&O's Detail    07 Feb 2020 07:01  -  08 Feb 2020 07:00  --------------------------------------------------------  IN:    dexmedetomidine Infusion: 494.6 mL    insulin regular Infusion: 94 mL    midazolam Infusion: 18 mL    Peptamen A.F.: 480 mL    sodium bicarbonate  Infusion: 150 mL  Total IN: 1236.6 mL    OUT:    Indwelling Catheter - Urethral: 160 mL    Rectal Tube: 800 mL    Ureteral Catheter: 350 mL  Total OUT: 1310 mL    Total NET: -73.4 mL      08 Feb 2020 07:01  -  08 Feb 2020 12:12  --------------------------------------------------------  IN:    dexmedetomidine Infusion: 22 mL    insulin regular Infusion: 16 mL    midazolam Infusion: 4 mL    propofol Infusion: 29.4 mL  Total IN: 71.4 mL    OUT:    Indwelling Catheter - Urethral: 250 mL  Total OUT: 250 mL    Total NET: -178.6 mL          Mode: AC/ CMV (Assist Control/ Continuous Mandatory Ventilation)  RR (machine): 26  TV (machine): 450  FiO2: 100  PEEP: 10  ITime: 1  MAP: 20  PIP: 26    CAPILLARY BLOOD GLUCOSE      POCT Blood Glucose.: 203 mg/dL (08 Feb 2020 10:59)  POCT Blood Glucose.: 204 mg/dL (08 Feb 2020 09:54)  POCT Blood Glucose.: 191 mg/dL (08 Feb 2020 08:11)  POCT Blood Glucose.: 126 mg/dL (08 Feb 2020 06:04)  POCT Blood Glucose.: 150 mg/dL (08 Feb 2020 04:08)  POCT Blood Glucose.: 160 mg/dL (08 Feb 2020 02:16)  POCT Blood Glucose.: 142 mg/dL (08 Feb 2020 00:12)  POCT Blood Glucose.: 165 mg/dL (07 Feb 2020 20:40)  POCT Blood Glucose.: 123 mg/dL (07 Feb 2020 17:52)  POCT Blood Glucose.: 164 mg/dL (07 Feb 2020 15:25)  POCT Blood Glucose.: 142 mg/dL (07 Feb 2020 13:55)    LABS:  ABG - ( 08 Feb 2020 04:56 )  pH, Arterial: 7.40  pH, Blood: x     /  pCO2: 37    /  pO2: 370   / HCO3: 23    / Base Excess: -1.8  /  SaO2: 100                                     7.7    13.98 )-----------( 83       ( 08 Feb 2020 04:00 )             23.8     02-08    138  |  100  |  111<HH>  ----------------------------<  143<H>  4.6   |  20  |  4.6<HH>    Ca    7.4<L>      08 Feb 2020 04:00  Phos  5.9     02-06  Mg     2.4     02-07    TPro  4.5<L>  /  Alb  2.4<L>  /  TBili  0.4  /  DBili  x   /  AST  17  /  ALT  17  /  AlkPhos  147<H>  02-07    PT/INR - ( 08 Feb 2020 04:00 )   PT: 19.20 sec;   INR: 1.67 ratio               Home Medications:  acetaminophen 500 mg oral tablet: 2 tab(s) orally every 8 hours (20 Jan 2020 13:38)  amLODIPine 10 mg oral tablet: 1 tab(s) orally once a day (at bedtime) (20 Jan 2020 13:38)  budesonide 3 mg oral capsule, extended release: 1 cap(s) orally 2 times a day (05 Dec 2019 17:19)  ferrous sulfate 325 mg (65 mg elemental iron) oral delayed release tablet: 1 tab(s) orally once a day (05 Dec 2019 17:19)  furosemide 20 mg oral tablet: 1 tab(s) orally once a day (05 Dec 2019 17:19)  gabapentin 300 mg oral capsule: 1 cap(s) orally once a day (at bedtime) (20 Jan 2020 13:38)  ibuprofen 400 mg oral tablet: 1 tab(s) orally every 6 hours, As needed, Moderate Pain (4 - 6) (20 Jan 2020 13:38)  ipratropium-albuterol 0.5 mg-2.5 mg/3 mLinhalation solution: 3 milliliter(s) inhaled every 6 hours, As Needed (20 Jan 2020 13:40)  lidocaine 5% topical film: Apply topically to affected area once a day (20 Jan 2020 13:38)  Metoprolol Tartrate 50 mg oral tablet: 1 tab(s) orally once a day (05 Dec 2019 17:19)  montelukast 10 mg oral tablet: 1 tab(s) orally once a day (at bedtime) (05 Dec 2019 17:19)  Multiple Vitamins with Minerals oral tablet: 1 tab(s) orally once a day (20 Jan 2020 13:38)  oxyCODONE 10 mg oral tablet: 1 tab(s) orally every 6 hours, As needed, Severe Pain (7 - 10) (20 Jan 2020 13:38)  predniSONE 20 mg oral tablet: 3 tab(s) orally once a day (20 Jan 2020 13:38)  ProAir HFA 90 mcg/inh inhalation aerosol: 1 puff(s) inhaled every 4 hours, As Needed (20 Jan 2020 13:40)  risedronate 150 mg oral tablet: 1 tab(s) orally once a month (05 Dec 2019 17:19)  tacrolimus 0.5 mg oral capsule: 1 cap(s) orally every 12 hours (05 Dec 2019 17:19)  tiotropium 18 mcg inhalation capsule: 1 cap(s) inhaled once a day, As Needed (20 Jan 2020 13:40)  Vitamin B12 1000 mcg oral tablet: 1 tab(s) orally once a day (05 Dec 2019 17:19)  Vitamin C 1000 mg oral tablet: 1 tab(s) orally once a day (05 Dec 2019 17:19)  warfarin 4 mg oral tablet: 1 tab(s) orally once a day (at bedtime) (20 Jan 2020 13:38)    HOSPITAL MEDICATIONS:  MEDICATIONS  (STANDING):  ALBUTerol    90 MICROgram(s) HFA Inhaler 1 Puff(s) Inhalation every 6 hours  calcium carbonate    500 mG (Tums) Chewable 2 Tablet(s) Chew every 8 hours  chlorhexidine 0.12% Liquid 15 milliLiter(s) Oral Mucosa every 12 hours  chlorhexidine 4% Liquid 1 Application(s) Topical <User Schedule>  cyanocobalamin 1000 MICROGram(s) Oral daily  dexMEDEtomidine Infusion 0.5 MICROgram(s)/kG/Hr (10.188 mL/Hr) IV Continuous <Continuous>  furosemide   Injectable 40 milliGRAM(s) IV Push every 12 hours  gabapentin 300 milliGRAM(s) Oral at bedtime  influenza   Vaccine 0.5 milliLiter(s) IntraMuscular once  insulin regular Infusion 4 Unit(s)/Hr (4 mL/Hr) IV Continuous <Continuous>  ipratropium 17 MICROgram(s) HFA Inhaler 1 Puff(s) Inhalation every 6 hours  methylPREDNISolone sodium succinate Injectable 80 milliGRAM(s) IV Push every 8 hours  midazolam Infusion 0.01 mG/kG/Hr (0.815 mL/Hr) IV Continuous <Continuous>  montelukast 10 milliGRAM(s) Oral at bedtime  multivitamin/minerals 1 Tablet(s) Oral daily  mupirocin 2% Ointment 1 Application(s) Topical two times a day  norepinephrine Infusion 0.05 MICROgram(s)/kG/Min (3.534 mL/Hr) IV Continuous <Continuous>  oseltamivir 30 milliGRAM(s) Oral <User Schedule>  pantoprazole   Suspension 40 milliGRAM(s) Oral daily  propofol Infusion 10 MICROgram(s)/kG/Min (4.89 mL/Hr) IV Continuous <Continuous>  sodium bicarbonate 650 milliGRAM(s) Oral every 8 hours  tacrolimus 0.5 milliGRAM(s) Oral every 12 hours    MEDICATIONS  (PRN):      REVIEW OF SYSTEMS:      [ x ] Unable to assess ROS because intubated sedated    PHYSICAL EXAM:          CONSTITUTIONAL: Well-developed; well-nourished; in no acute distress.   	SKIN: warm, dry  	HEAD: Normocephalic; atraumatic.  	EYES: PERRL, EOM, no conj injection, sclera clear  	ENT: No nasal discharge; airway clear.  	NECK: Supple; non tender.  No midline ttp ctls  	CARD: S1, S2 normal; no murmurs, gallops, or rubs. Regular rate and rhythm. 2+ RPs and DPs bilat, no carotid bruits, no pedal   edema, no calf pain b/l  	RESP: CTA  bilat good air movement No wheezes, rales or rhonchi.  	ABD: Soft, not tender, not distended, no CVA ttp no rebound or guarding, bowel sounds present  	EXT: Normal ROM.  No clubbing, cyanosis or edema.   	  	NEURO: Alert, awake, motor 5/5 R, 5/5 L        RADIOLOGY:  xray  < from: Xray Chest 1 View- PORTABLE-Routine (02.08.20 @ 06:14) >  Impression:      Cardiomegaly.    Bilateral pleural plaques and bilateral opacities.    Support tubes and lines as above.    No significant change from February7, 2020.    < end of copied text >    I spent 45 minutes of critical care time examining patient, reviewing vitals, labs, medications, imaging and discussing with the team goals of care to prevent life-threatening in this patient who is at high risk for deterioration or death due to:

## 2020-02-08 NOTE — PROGRESS NOTE ADULT - ASSESSMENT
73 y/o F with REJI in hospital for SOB, hemoptysis, cough.  PMH asthma, COPD not on home O2, autoimmune hepatitis on prednisone and tacrolimus, heterozygous prothrombin gene mutation with hx of PE and DVT on coumadin, recent hospitalization for pneumonia   # REJI/ ATN in nature/ vanco toxicity?  #  ? UO , s/p straight cath with 350 cc UO, please reinsert henley   # creatinine stable   #ID notes appreciated follow recs   # on FK continue for now trough noted doubt true trough on low dose/ goal around 5 , repeated level remains elevated, ? need to hols FK in view of infection , if ok with GI   # ph noted , start phoslo 2 tablets po q 8  corrected calcium around 8.2, on Ca carbonate , check PTH levels   # continue sodium bicarbonate 650 q 8   # start lasix 40 q 12  # platelet count stable   # no acute indication for RRT  # will follow  # prognosis guarded

## 2020-02-08 NOTE — PROGRESS NOTE ADULT - ASSESSMENT
· Assessment		  ASSESSMENT  75y/o F w/ hx of asthma, COPD not on home O2, autoimmune hepatitis on prednisone and tacrolimus, heterozygous prothrombin gene mutation with hx of PE and DVT on coumadin with recent hospitalization in January 2020 with a diagnosis of pneumonia presents for worsening SOB, hemoptysis and cough.    IMPRESSION  #Flu + AH1, Alveolar hemorrhage, ?superimposed atypical PNA (imaging not really consistent with bacterial PNA). Recent bronch 1/20 negative for PCP, Fungal, etc, previous bronch cx with yeast (likely oral contaminant) since GMS neg    2/3 Bronch   No organisms seen, 2/3 cytopath Rare atypical cells, few ciliated bronchial cells, alveolar macrophages and inflammatory cells, mainly neutrophils.    Fungitell: 49 (02-01-20 @ 11:17), Strep urine Ag neg, serum crypto Ag neg, CMV PCR undetectable, AFB neg    Quantiferon indeterminate    Lactate Dehydrogenase, Serum: 607 (02.03.20 @ 04:30)    Procalcitonin, Serum: 1.86:    CTA chest showing no PE but showing interval development of multifocal bilateral groundglass opacities as well as new moderate to severe bronchiectasis in the right lung,   1/13 CT b/l GGOs, compatible with intersitial edema      Serum Pro-Brain Natriuretic Peptide: 722 pg/mL (01.31.20 @ 09:25)<-- Serum Pro-Brain Natriuretic Peptide: 345 pg/mL (01.13.20 @ 12:10)    During last hospitalization: bronch cx bacterial NG, +yeast, pending ID, neg legionella, + MRSA PCR    MRSA PCR Result.: Positive (02-01-20 @ 20:40)  #Severe sepsis on admission P>90, WBC 19, RR>20, lactic acidosis Lactate, Blood: 2.9 mmol/L (01-31-20 @ 09:25)    afebrile   #Elevated Alk ph, transaminitis  #LLE wound, not infection     12/7 WCX MSSA, Kleb, bacteroides    8/2019 MRSA in a wound  #Immunosuppressed: autoimmune hepatitis on prednisone and tacrolimus    RECOMMENDATIONS  - oseltamivir 30 milliGRAM(s) daily 2/2-,  D5 would treat 7-10 days given immunocompromised   - bronch: f/u cx, fungal, AFB, PCP stain, viral cx, Toxo PCR  - OFF abx s/p cefepime/levaquin  - on methylPREDNISolone sodium succinate Injectable 80

## 2020-02-08 NOTE — PROGRESS NOTE ADULT - ASSESSMENT
IMPRESSION:    Acute hypoxic respiratory failure- refractory- on 100% O2-vent dependenmt  DAH  Influenza A treated   ARDS  REJI oliguric   HO Autoimmune hepatitis on tacrolimus and chronic steroids   h/o hypercoagulable state/ vte was on coumadin   Left great saphenous vein thrombosis   PLAN:    CNS:  SAT     HEENT: ET care.  Oral care   PULMONARY:  HOB @ 45 degrees. Solumedrol 80 mg q8h for now. Wean O2  PEEP 10.   FU PPl and Driving Pressure.   CARDIOVASCULAR: I=O.    GI: GI prophylaxis.  Continue feeding.     RENAL:  Follow up lytes. Replete as needed.  FU with Renal might need RRT   INFECTIOUS DISEASE: Follow up cultures.  ATB as per ID   HEMATOLOGICAL:  DVT prophylaxis.  Repeat LE Duplex in AM.  DW IR.  If progression, then IVC  ENDOCRINE:  Follow up FS.  Insulin protocol if needed.  MUSCULOSKELETAL: Bed rest ; skin protection    Code status: full     Prognosis: Poor prognosis     Continue MICU monitoring for now.

## 2020-02-08 NOTE — PROGRESS NOTE ADULT - SUBJECTIVE AND OBJECTIVE BOX
seen and examined  intubated/ventilated  s/p straight cath : 350 cc UO         PAST HISTORY  --------------------------------------------------------------------------------  No significant changes to PMH, PSH, FHx, SHx, unless otherwise noted    ALLERGIES & MEDICATIONS  --------------------------------------------------------------------------------  Allergies    No Known Allergies    Intolerances      Standing Inpatient Medications  ALBUTerol    90 MICROgram(s) HFA Inhaler 1 Puff(s) Inhalation every 6 hours  calcium carbonate    500 mG (Tums) Chewable 2 Tablet(s) Chew every 8 hours  chlorhexidine 0.12% Liquid 15 milliLiter(s) Oral Mucosa every 12 hours  chlorhexidine 4% Liquid 1 Application(s) Topical <User Schedule>  cyanocobalamin 1000 MICROGram(s) Oral daily  dexMEDEtomidine Infusion 0.5 MICROgram(s)/kG/Hr IV Continuous <Continuous>  gabapentin 300 milliGRAM(s) Oral at bedtime  influenza   Vaccine 0.5 milliLiter(s) IntraMuscular once  insulin regular Infusion 4 Unit(s)/Hr IV Continuous <Continuous>  ipratropium 17 MICROgram(s) HFA Inhaler 1 Puff(s) Inhalation every 6 hours  methylPREDNISolone sodium succinate Injectable 80 milliGRAM(s) IV Push every 8 hours  midazolam Infusion 0.01 mG/kG/Hr IV Continuous <Continuous>  montelukast 10 milliGRAM(s) Oral at bedtime  multivitamin/minerals 1 Tablet(s) Oral daily  mupirocin 2% Ointment 1 Application(s) Topical two times a day  norepinephrine Infusion 0.05 MICROgram(s)/kG/Min IV Continuous <Continuous>  oseltamivir 30 milliGRAM(s) Oral <User Schedule>  pantoprazole   Suspension 40 milliGRAM(s) Oral daily  sodium bicarbonate 650 milliGRAM(s) Oral every 8 hours  tacrolimus 0.5 milliGRAM(s) Oral every 12 hours    PRN Inpatient Medications      VITALS/PHYSICAL EXAM  --------------------------------------------------------------------------------  T(C): 37.8 (02-08-20 @ 04:00), Max: 37.8 (02-08-20 @ 04:00)  HR: 100 (02-08-20 @ 06:30) (60 - 126)  BP: 100/45 (02-07-20 @ 10:00) (100/45 - 100/45)  RR: 33 (02-08-20 @ 06:30) (19 - 43)  SpO2: 100% (02-08-20 @ 06:30) (82% - 100%)  Wt(kg): --        02-07-20 @ 07:01  -  02-08-20 @ 07:00  --------------------------------------------------------  IN: 1208.6 mL / OUT: 1310 mL / NET: -101.4 mL      Physical Exam:  	Gen: intubated/ventilated   	Pulm:  B/L xiomara   	CV:S1S2; no rub  	Abd: +distended  	LE:  edema  	    LABS/STUDIES  --------------------------------------------------------------------------------              7.7    13.98 >-----------<  83       [02-08-20 @ 04:00]              23.8     138  |  100  |  111  ----------------------------<  143      [02-08-20 @ 04:00]  4.6   |  20  |  4.6        Ca     7.4     [02-08-20 @ 04:00]      Mg     2.4     [02-07-20 @ 04:45]      Phos  5.9     [02-06-20 @ 19:40]    TPro  4.5  /  Alb  2.4  /  TBili  0.4  /  DBili  x   /  AST  17  /  ALT  17  /  AlkPhos  147  [02-07-20 @ 04:45]    PT/INR: PT 19.20, INR 1.67       [02-08-20 @ 04:00]          [02-06-20 @ 19:40]    Creatinine Trend:  SCr 4.6 [02-08 @ 04:00]  SCr 4.6 [02-07 @ 04:45]  SCr 4.2 [02-06 @ 20:30]  SCr 4.4 [02-06 @ 19:40]  SCr 3.9 [02-06 @ 18:20]    Urinalysis - [01-31-20 @ 17:11]      Color Yellow / Appearance Slightly Turbid / SG 1.025 / pH 6.0      Gluc 100 mg/dL / Ketone Small  / Bili Negative / Urobili <2 mg/dL       Blood Small / Protein 100 mg/dL / Leuk Est Negative / Nitrite Negative      RBC 1 / WBC 9 / Hyaline 14 / Gran  / Sq Epi  / Non Sq Epi 8 / Bacteria Negative      HbA1c 6.9      [03-12-18 @ 04:54]  TSH 0.57      [02-01-20 @ 04:46]  Lipid: chol 203, , HDL 74,       [02-01-20 @ 04:46]      Rheumatoid Factor 59      [02-01-20 @ 04:46]  anti-GBM <1.0      [02-01-20 @ 04:46]  Free Light Chains: kappa 4.52, lambda 3.30, ratio = 1.37      [02-01 @ 04:46]  Immunofixation Serum:   No Monoclonal Band Identified    Reference Range: None Detected      [02-01-20 @ 04:46]  SPEP Interpretation: Normal Electrophoresis Pattern      [02-01-20 @ 04:46]

## 2020-02-09 NOTE — PROGRESS NOTE ADULT - ASSESSMENT
· Assessment		  ASSESSMENT  73y/o F w/ hx of asthma, COPD not on home O2, autoimmune hepatitis on prednisone and tacrolimus, heterozygous prothrombin gene mutation with hx of PE and DVT on coumadin with recent hospitalization in January 2020 with a diagnosis of pneumonia presents for worsening SOB, hemoptysis and cough.    IMPRESSION  #Flu + AH1, Alveolar hemorrhage, ?superimposed atypical PNA (imaging not really consistent with bacterial PNA). Recent bronch 1/20 negative for PCP, Fungal, etc, previous bronch cx with yeast (likely oral contaminant) since GMS neg    2/3 Bronch   No organisms seen, 2/3 cytopath Rare atypical cells, few ciliated bronchial cells, alveolar macrophages and inflammatory cells, mainly neutrophils.    Fungitell: 49 (02-01-20 @ 11:17), Strep urine Ag neg, serum crypto Ag neg, CMV PCR undetectable, AFB neg    Quantiferon indeterminate    Lactate Dehydrogenase, Serum: 607 (02.03.20 @ 04:30)    Procalcitonin, Serum: 1.86:    CTA chest showing no PE but showing interval development of multifocal bilateral groundglass opacities as well as new moderate to severe bronchiectasis in the right lung,   1/13 CT b/l GGOs, compatible with intersitial edema      Serum Pro-Brain Natriuretic Peptide: 722 pg/mL (01.31.20 @ 09:25)<-- Serum Pro-Brain Natriuretic Peptide: 345 pg/mL (01.13.20 @ 12:10)    During last hospitalization: bronch cx bacterial NG, +yeast, pending ID, neg legionella, + MRSA PCR    MRSA PCR Result.: Positive (02-01-20 @ 20:40)  #Severe sepsis on admission P>90, WBC 19, RR>20, lactic acidosis Lactate, Blood: 2.9 mmol/L (01-31-20 @ 09:25)    afebrile   #Elevated Alk ph, transaminitis  #LLE wound, not infection     12/7 WCX MSSA, Kleb, bacteroides    8/2019 MRSA in a wound  #Immunosuppressed: autoimmune hepatitis on prednisone and tacrolimus    RECOMMENDATIONS  - oseltamivir 30 milliGRAM(s) daily 2/2-,  D5 would treat 7-10 days given immunocompromised   - bronch: f/u cx, fungal, AFB, PCP stain, viral cx, Toxo PCR  - OFF abx s/p cefepime/levaquin  - on methylPREDNISolone sodium succinate Injectable 80

## 2020-02-09 NOTE — PROGRESS NOTE ADULT - SUBJECTIVE AND OBJECTIVE BOX
Nephrology progress note    Patient is seen and examined, events over the last 24 h noted .  intubated on vent.    Allergies:  No Known Allergies    Hospital Medications:   MEDICATIONS  (STANDING):  ALBUTerol    90 MICROgram(s) HFA Inhaler 1 Puff(s) Inhalation every 6 hours  calcium carbonate    500 mG (Tums) Chewable 2 Tablet(s) Chew every 8 hours  chlorhexidine 0.12% Liquid 15 milliLiter(s) Oral Mucosa every 12 hours  chlorhexidine 4% Liquid 1 Application(s) Topical <User Schedule>  cyanocobalamin 1000 MICROGram(s) Oral daily  dexMEDEtomidine Infusion 0.5 MICROgram(s)/kG/Hr (10.188 mL/Hr) IV Continuous <Continuous>  furosemide   Injectable 40 milliGRAM(s) IV Push every 12 hours  gabapentin 300 milliGRAM(s) Oral at bedtime  influenza   Vaccine 0.5 milliLiter(s) IntraMuscular once  insulin glargine Injectable (LANTUS) 50 Unit(s) SubCutaneous every morning  insulin regular Infusion 4 Unit(s)/Hr (4 mL/Hr) IV Continuous <Continuous>  ipratropium 17 MICROgram(s) HFA Inhaler 1 Puff(s) Inhalation every 6 hours  methylPREDNISolone sodium succinate Injectable 80 milliGRAM(s) IV Push every 8 hours  midazolam Infusion 0.01 mG/kG/Hr (0.815 mL/Hr) IV Continuous <Continuous>  montelukast 10 milliGRAM(s) Oral at bedtime  multivitamin/minerals 1 Tablet(s) Oral daily  mupirocin 2% Ointment 1 Application(s) Topical two times a day  norepinephrine Infusion 0.05 MICROgram(s)/kG/Min (3.534 mL/Hr) IV Continuous <Continuous>  oseltamivir 30 milliGRAM(s) Oral <User Schedule>  pantoprazole   Suspension 40 milliGRAM(s) Oral daily  propofol Infusion 10 MICROgram(s)/kG/Min (4.89 mL/Hr) IV Continuous <Continuous>  sodium bicarbonate 650 milliGRAM(s) Oral every 8 hours  tacrolimus 0.5 milliGRAM(s) Oral every 12 hours        VITALS:  T(F): 96.1 (02-09-20 @ 08:00), Max: 99.7 (02-08-20 @ 16:00)  HR: 76 (02-09-20 @ 10:00)  BP: 114/56  RR: 25 (02-09-20 @ 10:00)  SpO2: 100% (02-09-20 @ 10:00)  Wt(kg): --    02-07 @ 07:01  -  02-08 @ 07:00  --------------------------------------------------------  IN: 1236.6 mL / OUT: 1310 mL / NET: -73.4 mL    02-08 @ 07:01  -  02-09 @ 07:00  --------------------------------------------------------  IN: 781.3 mL / OUT: 2220 mL / NET: -1438.7 mL    02-09 @ 07:01  -  02-09 @ 11:16  --------------------------------------------------------  IN: 95.6 mL / OUT: 325 mL / NET: -229.4 mL          PHYSICAL EXAM:  Constitutional: intubated on vent  Respiratory: on MV, b/l rhonchi  Cardiovascular: S1, S2, RRR  Gastrointestinal: distension +  Extremities: No peripheral edema  :  +henley.       LABS:  02-09    137  |  98  |  134<HH>  ----------------------------<  300<H>  4.2   |  21  |  4.8<HH>    Ca    7.3<L>      09 Feb 2020 04:30  Mg     2.5     02-09                        7.7    14.75 )-----------( 97       ( 09 Feb 2020 04:30 )             23.2     Vancomycin Level, Trough: 32.4 ug/mL (02.07.20 @ 06:35)    Tacrolimus (), Serum: 11.7 ng/mL (02.06.20 @ 19:40)    Urine Studies:      RADIOLOGY & ADDITIONAL STUDIES:

## 2020-02-09 NOTE — PROGRESS NOTE ADULT - SUBJECTIVE AND OBJECTIVE BOX
Med  Attending Progress Daily Note     08 Feb 2020 12:12  remains intubated on high oxygen requirmat   Chart reviewed, patient examined. Pertinent results reviewed.  specialist f/u reviewed  HD#9    He has history of Prothrombin gene mutation  Autoimmune hepatitis  Other chronic pulmonary embolism without acute cor pulmonale  Essential hypertension  Autoimmune hepatitis treated with steroids  Asthma  DVT (deep venous thrombosis)    Interval event for past 24 hr:  DONI PATRICK  74y had no event.   Pt remains intubated- FiO2 back to 50%  HPI:  73y/o F w/ hx of asthma, COPD not on home O2, autoimmune hepatitis on prednisone and tacrolimus, heterozygous prothrombin gene mutation with hx of PE and DVT on coumadin with recent hospitalization in January 2020 with a diagnosis of pneumonia presented for worsening SOB, hemoptysis and cough. Everything started the night PTA when patient was in bed, started to feel shortness of breath and had many episodes of hemoptysis with clots, she also had substernal chest pain non radiating, moderate in intensity that worsens on coughing. She denies fever but endorses chills. She also admits for lightheadedness that occurred yesterday, no HA, abd pain, dysuria or paresthesias. She had 2 loose bowel movements since day PTA with some blood in the stools that she attributes to her hemorrhoids. She stopped Coumadin couple of days PTA since her INR was supratherapeutic. She is from [facility] short term and also mentions that her  and another family member have the flu and she was exposed to them. She did not have the flu shot this season.  In ED, temp was 100.1 rectally, tachycardic ( sinus) , she was saturating to 70s on non rebreather and her SaO2 went up to 95%. ABG showing respiratory alkalosis initially and then corrected after BIPAP placement and correction of RR.  CTA chest showing no PE but showing interval development of multifocal bilateral groundglass opacities as well as new moderate to severe bronchiectasis in the right lung. Findings are concerning for an acute infectious/inflammatory process. (31 Jan 2020 14:36)  Her viral testing was POS, and she has been intubated since shortly after admission.    OBJECTIVE:  ICU Vital Signs Last 24 Hrs  T(C): 35.6 (09 Feb 2020 08:00), Max: 37.6 (08 Feb 2020 16:00)  T(F): 96.1 (09 Feb 2020 08:00), Max: 99.7 (08 Feb 2020 16:00)  HR: 76 (09 Feb 2020 10:00) (76 - 130)  BP: --  BP(mean): --  ABP: 114/56 (09 Feb 2020 10:00) (98/46 - 178/66)  ABP(mean): 78 (09 Feb 2020 10:00) (66 - 106)  RR: 25 (09 Feb 2020 10:00) (25 - 44)  SpO2: 100% (09 Feb 2020 10:00) (94% - 100%)    I&O's Summary    08 Feb 2020 07:01  -  09 Feb 2020 07:00  --------------------------------------------------------  IN: 781.3 mL / OUT: 2220 mL / NET: -1438.7 mL    09 Feb 2020 07:01  -  09 Feb 2020 11:43  --------------------------------------------------------  IN: 95.6 mL / OUT: 325 mL / NET: -229.4 mL        il    08 Feb 2020 07:01  -  08 Feb 2020 12:12  --------------------------------------------------------  IN:    dexmedetomidine Infusion: 22 mL    insulin regular Infusion: 16 mL    midazolam Infusion: 4 mL    propofol Infusion: 29.4 mL  Total IN: 71.4 mL    OUT:    Indwelling Catheter - Urethral: 250 mL  Total OUT: 250 mL    Total NET: -178.6 mL          Mode: AC/ CMV (Assist Control/ Continuous Mandatory Ventilation)  RR (machine): 26  TV (machine): 450  FiO2: 50  PEEP: 10  ITime: 1  MAP: 20  PIP: 26    LABS:  ABG - ( 08 Feb 2020 04:56 )  pH, Arterial: 7.40  pH, Blood: x     /  pCO2: 37    /  pO2: 370   / HCO3: 23    / Base Excess: -1.8  /  SaO2: 100                             7.7    14.75 )-----------( 97       ( 09 Feb 2020 04:30 )             23.2                         7.7    13.98 )-----------( 83       ( 08 Feb 2020 04:00 )             23.8     02-09    137  |  98  |  134<HH>  ----------------------------<  300<H>  4.2   |  21  |  4.8<HH>    Ca    7.3<L>      09 Feb 2020 04:30  Mg     2.5     02-09      02-08    138  |  100  |  111<HH>  ----------------------------<  143<H>  4.6   |  20  |  4.6<HH>    Ca    7.4<L>      08 Feb 2020 04:00  Phos  5.9     02-06  Mg     2.4     02-07    TPro  4.5<L>  /  Alb  2.4<L>  /  TBili  0.4  /  DBili  x   /  AST  17  /  ALT  17  /  AlkPhos  147<H>  02-07    PT/INR - ( 08 Feb 2020 04:00 )   PT: 19.20 sec;   INR: 1.67 ratio               Home Medications:  acetaminophen 500 mg oral tablet: 2 tab(s) orally every 8 hours (20 Jan 2020 13:38)  amLODIPine 10 mg oral tablet: 1 tab(s) orally once a day (at bedtime) (20 Jan 2020 13:38)  budesonide 3 mg oral capsule, extended release: 1 cap(s) orally 2 times a day (05 Dec 2019 17:19)  ferrous sulfate 325 mg (65 mg elemental iron) oral delayed release tablet: 1 tab(s) orally once a day (05 Dec 2019 17:19)  furosemide 20 mg oral tablet: 1 tab(s) orally once a day (05 Dec 2019 17:19)  gabapentin 300 mg oral capsule: 1 cap(s) orally once a day (at bedtime) (20 Jan 2020 13:38)  ibuprofen 400 mg oral tablet: 1 tab(s) orally every 6 hours, As needed, Moderate Pain (4 - 6) (20 Jan 2020 13:38)  ipratropium-albuterol 0.5 mg-2.5 mg/3 mLinhalation solution: 3 milliliter(s) inhaled every 6 hours, As Needed (20 Jan 2020 13:40)  lidocaine 5% topical film: Apply topically to affected area once a day (20 Jan 2020 13:38)  Metoprolol Tartrate 50 mg oral tablet: 1 tab(s) orally once a day (05 Dec 2019 17:19)  montelukast 10 mg oral tablet: 1 tab(s) orally once a day (at bedtime) (05 Dec 2019 17:19)  Multiple Vitamins with Minerals oral tablet: 1 tab(s) orally once a day (20 Jan 2020 13:38)  oxyCODONE 10 mg oral tablet: 1 tab(s) orally every 6 hours, As needed, Severe Pain (7 - 10) (20 Jan 2020 13:38)  predniSONE 20 mg oral tablet: 3 tab(s) orally once a day (20 Jan 2020 13:38)  ProAir HFA 90 mcg/inh inhalation aerosol: 1 puff(s) inhaled every 4 hours, As Needed (20 Jan 2020 13:40)  risedronate 150 mg oral tablet: 1 tab(s) orally once a month (05 Dec 2019 17:19)  tacrolimus 0.5 mg oral capsule: 1 cap(s) orally every 12 hours (05 Dec 2019 17:19)  tiotropium 18 mcg inhalation capsule: 1 cap(s) inhaled once a day, As Needed (20 Jan 2020 13:40)  Vitamin B12 1000 mcg oral tablet: 1 tab(s) orally once a day (05 Dec 2019 17:19)  Vitamin C 1000 mg oral tablet: 1 tab(s) orally once a day (05 Dec 2019 17:19)  warfarin 4 mg oral tablet: 1 tab(s) orally once a day (at bedtime) (20 Jan 2020 13:38)  MEDICATIONS  (STANDING):  ALBUTerol    90 MICROgram(s) HFA Inhaler 1 Puff(s) Inhalation every 6 hours  calcium carbonate    500 mG (Tums) Chewable 2 Tablet(s) Chew every 8 hours  chlorhexidine 0.12% Liquid 15 milliLiter(s) Oral Mucosa every 12 hours  chlorhexidine 4% Liquid 1 Application(s) Topical <User Schedule>  cyanocobalamin 1000 MICROGram(s) Oral daily  dexMEDEtomidine Infusion 0.5 MICROgram(s)/kG/Hr (10.188 mL/Hr) IV Continuous <Continuous>  furosemide   Injectable 40 milliGRAM(s) IV Push every 12 hours  gabapentin 300 milliGRAM(s) Oral at bedtime  influenza   Vaccine 0.5 milliLiter(s) IntraMuscular once  insulin glargine Injectable (LANTUS) 50 Unit(s) SubCutaneous every morning  insulin regular Infusion 4 Unit(s)/Hr (4 mL/Hr) IV Continuous <Continuous>  ipratropium 17 MICROgram(s) HFA Inhaler 1 Puff(s) Inhalation every 6 hours  methylPREDNISolone sodium succinate Injectable 80 milliGRAM(s) IV Push every 8 hours  midazolam Infusion 0.01 mG/kG/Hr (0.815 mL/Hr) IV Continuous <Continuous>  montelukast 10 milliGRAM(s) Oral at bedtime  multivitamin/minerals 1 Tablet(s) Oral daily  mupirocin 2% Ointment 1 Application(s) Topical two times a day  norepinephrine Infusion 0.05 MICROgram(s)/kG/Min (3.534 mL/Hr) IV Continuous <Continuous>  oseltamivir 30 milliGRAM(s) Oral <User Schedule>  pantoprazole   Suspension 40 milliGRAM(s) Oral daily  propofol Infusion 10 MICROgram(s)/kG/Min (4.89 mL/Hr) IV Continuous <Continuous>  sodium bicarbonate 650 milliGRAM(s) Oral every 8 hours  tacrolimus 0.5 milliGRAM(s) Oral every 12 hours    MEDICATIONS  (PRN):          REVIEW OF SYSTEMS:      [ x ] Unable to assess ROS because intubated sedated    PHYSICAL EXAM:          CONSTITUTIONAL: Well-developed; well-nourished; intubated, sedated, no response to V/P stim.  	SKIN: warm, dry  	HEAD: Normocephalic; atraumatic.  	EYES: PERRL, EOM, no conj injection, sclera clear  	ENT: No nasal discharge; airway clear.  	NECK: Supple; non tender.  No midline ttp ctls  	CARD: RR, no MRG; 2+ RPs and DPs bilat, no carotid bruits, milds LE edema bilat.  	RESP: CTA  bilat good air movement No wheezes, rales or rhonchi.  	ABD: Soft, not tender, not distended, no CVA ttp no rebound or guarding, bowel sounds present  	EXT: Normal ROM.  No clubbing, cyanosis or edema. wraps over UE- atrophic/ecchymotic skin   	  	NEURO: sedated- no response        RADIOLOGY:  xray  < from: Xray Chest 1 View- PORTABLE-Routine (02.08.20 @ 06:14) >  Impression:      Cardiomegaly.    Bilateral pleural plaques and bilateral opacities.    Support tubes and lines as above.    No significant change from February7, 2020.    < end of copied text >    I spent 45 minutes of critical care time examining patient, reviewing vitals, labs, medications, imaging and discussing with the team goals of care to prevent life-threatening in this patient who is at high risk for deterioration or death due to: Med  Attending Progress Daily Note     08 Feb 2020 12:12  remains intubated on high oxygen requirement  Chart reviewed, patient examined. Pertinent results reviewed.  specialist f/u reviewed  HD#9    He has history of Prothrombin gene mutation  Autoimmune hepatitis  Other chronic pulmonary embolism without acute cor pulmonale  Essential hypertension  Autoimmune hepatitis treated with steroids  Asthma  DVT (deep venous thrombosis)    Interval event for past 24 hr:  DONI PATRICK  74y had no event.   Pt remains intubated- FiO2 back to 50%, then back up to 70%  HPI:  73y/o F w/ hx of asthma, COPD not on home O2, autoimmune hepatitis on prednisone and tacrolimus, heterozygous prothrombin gene mutation with hx of PE and DVT on coumadin with recent hospitalization in January 2020 with a diagnosis of pneumonia presented for worsening SOB, hemoptysis and cough. Everything started the night PTA when patient was in bed, started to feel shortness of breath and had many episodes of hemoptysis with clots, she also had substernal chest pain non radiating, moderate in intensity that worsens on coughing. She denies fever but endorses chills. She also admits for lightheadedness that occurred yesterday, no HA, abd pain, dysuria or paresthesias. She had 2 loose bowel movements since day PTA with some blood in the stools that she attributes to her hemorrhoids. She stopped Coumadin couple of days PTA since her INR was supratherapeutic. She is from [facility] and also mentions that her  and another family member have the flu and she was exposed to them. She did not have the flu shot this season.  In ED, temp was 100.1 rectally, tachycardic ( sinus) , she was saturating to 70s on non rebreather and her SaO2 went up to 95%. ABG showing respiratory alkalosis initially and then corrected after BIPAP placement and correction of RR.  CTA chest showing no PE but showing interval development of multifocal bilateral groundglass opacities as well as new moderate to severe bronchiectasis in the right lung. Findings are concerning for an acute infectious/inflammatory process. (31 Jan 2020 14:36)  Her viral testing was POS, and she has been intubated since shortly after admission.    OBJECTIVE:  ICU Vital Signs Last 24 Hrs  T(C): 35.6 (09 Feb 2020 08:00), Max: 37.6 (08 Feb 2020 16:00)  T(F): 96.1 (09 Feb 2020 08:00), Max: 99.7 (08 Feb 2020 16:00)  HR: 76 (09 Feb 2020 10:00) (76 - 130)  BP: --  BP(mean): --  ABP: 114/56 (09 Feb 2020 10:00) (98/46 - 178/66)  ABP(mean): 78 (09 Feb 2020 10:00) (66 - 106)  RR: 25 (09 Feb 2020 10:00) (25 - 44)  SpO2: 100% (09 Feb 2020 10:00) (94% - 100%)    I&O's Summary    08 Feb 2020 07:01  -  09 Feb 2020 07:00  --------------------------------------------------------  IN: 781.3 mL / OUT: 2220 mL / NET: -1438.7 mL    09 Feb 2020 07:01  -  09 Feb 2020 11:43  --------------------------------------------------------  IN: 95.6 mL / OUT: 325 mL / NET: -229.4 mL        il    08 Feb 2020 07:01  -  08 Feb 2020 12:12  --------------------------------------------------------  IN:    dexmedetomidine Infusion: 22 mL    insulin regular Infusion: 16 mL    midazolam Infusion: 4 mL    propofol Infusion: 29.4 mL  Total IN: 71.4 mL    OUT:    Indwelling Catheter - Urethral: 250 mL  Total OUT: 250 mL    Total NET: -178.6 mL          Mode: AC/ CMV (Assist Control/ Continuous Mandatory Ventilation)  RR (machine): 26  TV (machine): 450  FiO2: 50  PEEP: 10  ITime: 1  MAP: 20  PIP: 26    LABS:  ABG - ( 08 Feb 2020 04:56 )  pH, Arterial: 7.40  pH, Blood: x     /  pCO2: 37    /  pO2: 370   / HCO3: 23    / Base Excess: -1.8  /  SaO2: 100                             7.7    14.75 )-----------( 97       ( 09 Feb 2020 04:30 )             23.2                         7.7    13.98 )-----------( 83       ( 08 Feb 2020 04:00 )             23.8     02-09    137  |  98  |  134<HH>  ----------------------------<  300<H>  4.2   |  21  |  4.8<HH>    Ca    7.3<L>      09 Feb 2020 04:30  Mg     2.5     02-09      02-08    138  |  100  |  111<HH>  ----------------------------<  143<H>  4.6   |  20  |  4.6<HH>    Ca    7.4<L>      08 Feb 2020 04:00  Phos  5.9     02-06  Mg     2.4     02-07    TPro  4.5<L>  /  Alb  2.4<L>  /  TBili  0.4  /  DBili  x   /  AST  17  /  ALT  17  /  AlkPhos  147<H>  02-07    PT/INR - ( 08 Feb 2020 04:00 )   PT: 19.20 sec;   INR: 1.67 ratio               Home Medications:  acetaminophen 500 mg oral tablet: 2 tab(s) orally every 8 hours (20 Jan 2020 13:38)  amLODIPine 10 mg oral tablet: 1 tab(s) orally once a day (at bedtime) (20 Jan 2020 13:38)  budesonide 3 mg oral capsule, extended release: 1 cap(s) orally 2 times a day (05 Dec 2019 17:19)  ferrous sulfate 325 mg (65 mg elemental iron) oral delayed release tablet: 1 tab(s) orally once a day (05 Dec 2019 17:19)  furosemide 20 mg oral tablet: 1 tab(s) orally once a day (05 Dec 2019 17:19)  gabapentin 300 mg oral capsule: 1 cap(s) orally once a day (at bedtime) (20 Jan 2020 13:38)  ibuprofen 400 mg oral tablet: 1 tab(s) orally every 6 hours, As needed, Moderate Pain (4 - 6) (20 Jan 2020 13:38)  ipratropium-albuterol 0.5 mg-2.5 mg/3 mLinhalation solution: 3 milliliter(s) inhaled every 6 hours, As Needed (20 Jan 2020 13:40)  lidocaine 5% topical film: Apply topically to affected area once a day (20 Jan 2020 13:38)  Metoprolol Tartrate 50 mg oral tablet: 1 tab(s) orally once a day (05 Dec 2019 17:19)  montelukast 10 mg oral tablet: 1 tab(s) orally once a day (at bedtime) (05 Dec 2019 17:19)  Multiple Vitamins with Minerals oral tablet: 1 tab(s) orally once a day (20 Jan 2020 13:38)  oxyCODONE 10 mg oral tablet: 1 tab(s) orally every 6 hours, As needed, Severe Pain (7 - 10) (20 Jan 2020 13:38)  predniSONE 20 mg oral tablet: 3 tab(s) orally once a day (20 Jan 2020 13:38)  ProAir HFA 90 mcg/inh inhalation aerosol: 1 puff(s) inhaled every 4 hours, As Needed (20 Jan 2020 13:40)  risedronate 150 mg oral tablet: 1 tab(s) orally once a month (05 Dec 2019 17:19)  tacrolimus 0.5 mg oral capsule: 1 cap(s) orally every 12 hours (05 Dec 2019 17:19)  tiotropium 18 mcg inhalation capsule: 1 cap(s) inhaled once a day, As Needed (20 Jan 2020 13:40)  Vitamin B12 1000 mcg oral tablet: 1 tab(s) orally once a day (05 Dec 2019 17:19)  Vitamin C 1000 mg oral tablet: 1 tab(s) orally once a day (05 Dec 2019 17:19)  warfarin 4 mg oral tablet: 1 tab(s) orally once a day (at bedtime) (20 Jan 2020 13:38)  MEDICATIONS  (STANDING):  ALBUTerol    90 MICROgram(s) HFA Inhaler 1 Puff(s) Inhalation every 6 hours  calcium carbonate    500 mG (Tums) Chewable 2 Tablet(s) Chew every 8 hours  chlorhexidine 0.12% Liquid 15 milliLiter(s) Oral Mucosa every 12 hours  chlorhexidine 4% Liquid 1 Application(s) Topical <User Schedule>  cyanocobalamin 1000 MICROGram(s) Oral daily  dexMEDEtomidine Infusion 0.5 MICROgram(s)/kG/Hr (10.188 mL/Hr) IV Continuous <Continuous>  furosemide   Injectable 40 milliGRAM(s) IV Push every 12 hours  gabapentin 300 milliGRAM(s) Oral at bedtime  influenza   Vaccine 0.5 milliLiter(s) IntraMuscular once  insulin glargine Injectable (LANTUS) 50 Unit(s) SubCutaneous every morning  insulin regular Infusion 4 Unit(s)/Hr (4 mL/Hr) IV Continuous <Continuous>  ipratropium 17 MICROgram(s) HFA Inhaler 1 Puff(s) Inhalation every 6 hours  methylPREDNISolone sodium succinate Injectable 80 milliGRAM(s) IV Push every 8 hours  midazolam Infusion 0.01 mG/kG/Hr (0.815 mL/Hr) IV Continuous <Continuous>  montelukast 10 milliGRAM(s) Oral at bedtime  multivitamin/minerals 1 Tablet(s) Oral daily  mupirocin 2% Ointment 1 Application(s) Topical two times a day  norepinephrine Infusion 0.05 MICROgram(s)/kG/Min (3.534 mL/Hr) IV Continuous <Continuous>  oseltamivir 30 milliGRAM(s) Oral <User Schedule>  pantoprazole   Suspension 40 milliGRAM(s) Oral daily  propofol Infusion 10 MICROgram(s)/kG/Min (4.89 mL/Hr) IV Continuous <Continuous>  sodium bicarbonate 650 milliGRAM(s) Oral every 8 hours  tacrolimus 0.5 milliGRAM(s) Oral every 12 hours    MEDICATIONS  (PRN):          REVIEW OF SYSTEMS:      [ x ] Unable to assess ROS because intubated sedated    PHYSICAL EXAM:          CONSTITUTIONAL: Well-developed; well-nourished; intubated, sedated, no response to V/P stim.  	SKIN: warm, dry atrophic , damaged skin on ext.  	HEAD: Normocephalic; atraumatic.  	EYES: PERRL, EOM, no conj injection, sclera clear  	ENT: No nasal discharge; airway clear.  	NECK: Supple; non tender.  No midline ttp ctls  	CARD: RR, no MRG; 2+ RPs and DPs bilat, no carotid bruits, milds LE edema bilat.  	RESP: CTA  bilat good air movement No wheezes, rales or rhonchi.  	ABD: Soft, not tender, not distended, no CVA ttp no rebound or guarding, bowel sounds present  	EXT: Normal ROM.  No clubbing, cyanosis or edema. wraps over UE- atrophic/ecchymotic skin   	  	NEURO: sedated- no response        RADIOLOGY:  xray  < from: Xray Chest 1 View- PORTABLE-Routine (02.08.20 @ 06:14) >  Impression:      Cardiomegaly.    Bilateral pleural plaques and bilateral opacities.    Support tubes and lines as above.    No significant change from February7, 2020.    < end of copied text >    I spent 45 minutes of critical care time examining patient, reviewing vitals, labs, medications, imaging and discussing with the team goals of care to prevent life-threatening in this patient who is at high risk for deterioration or death due to:

## 2020-02-09 NOTE — PROGRESS NOTE ADULT - SUBJECTIVE AND OBJECTIVE BOX
CTU Attending Progress Daily Note     09 Feb 2020 12:11  POD# -   He has history of Prothrombin gene mutation  Autoimmune hepatitis  Other chronic pulmonary embolism without acute cor pulmonale  Essential hypertension  Autoimmune hepatitis treated with steroids  Asthma  DVT (deep venous thrombosis)    Interval event for past 24 hr:  DONI PATRICK  74y had no event.   Current Complains:  DONI PATRICK has no new complains  HPI:  73y/o F w/ hx of asthma, COPD not on home O2, autoimmune hepatitis on prednisone and tacrolimus, heterozygous prothrombin gene mutation with hx of PE and DVT on coumadin with recent hospitalization in January 2020 with a diagnosis of pneumonia presents for worsening SOB, hemoptysis and cough. Everything started yesterday night when patient was in bed, started to feel shortness of breath and had many episodes of hemoptysis with clots, she also had substernal chest pain non radiating, moderate in intensity that worsens on coughing. She denies fever but endorses chills. She also admits for lightheadedness that occurred yesterday, no HA, abd pain, dysuria or paresthesias. She had 2 loose bowel movements since yesterday with some blood in the stools that she attributes to her hemorrhoids. She stopped Coumadin couple of days ago since her INR was supratherapeutic. She is from [facility] short term and also mentions that her  and another family member have the flu and she was exposed to them. She did not have the flu shot this season.  In ED, temp was 100.1 rectally, tachycardic ( sinus) , she was saturating to 70s on non rebreather and her SaO2 went up to 95%. ABG showing respiratory alkalosis initially and then corrected after BIPAP placement and correction of RR.  CTA chest showing no PE but showing interval development of multifocal bilateral groundglass opacities as well as new moderate to severe bronchiectasis in the right lung. Findings are concerning for an acute infectious/inflammatory process. (31 Jan 2020 14:36)    OBJECTIVE:  ICU Vital Signs Last 24 Hrs  T(C): 35.6 (09 Feb 2020 08:00), Max: 37.6 (08 Feb 2020 16:00)  T(F): 96.1 (09 Feb 2020 08:00), Max: 99.7 (08 Feb 2020 16:00)  HR: 76 (09 Feb 2020 10:00) (76 - 130)  BP: --  BP(mean): --  ABP: 114/56 (09 Feb 2020 10:00) (98/46 - 178/66)  ABP(mean): 78 (09 Feb 2020 10:00) (66 - 106)  RR: 25 (09 Feb 2020 10:00) (25 - 44)  SpO2: 100% (09 Feb 2020 10:00) (94% - 100%)    I&O's Summary    08 Feb 2020 07:01  -  09 Feb 2020 07:00  --------------------------------------------------------  IN: 781.3 mL / OUT: 2220 mL / NET: -1438.7 mL    09 Feb 2020 07:01  -  09 Feb 2020 12:12  --------------------------------------------------------  IN: 95.6 mL / OUT: 325 mL / NET: -229.4 mL      I&O's Detail    08 Feb 2020 07:01  -  09 Feb 2020 07:00  --------------------------------------------------------  IN:    dexmedetomidine Infusion: 22 mL    insulin regular Infusion: 99 mL    midazolam Infusion: 16.2 mL    Peptamen A.F.: 240 mL    propofol Infusion: 404.1 mL  Total IN: 781.3 mL    OUT:    Indwelling Catheter - Urethral: 1670 mL    Rectal Tube: 550 mL  Total OUT: 2220 mL    Total NET: -1438.7 mL      09 Feb 2020 07:01  -  09 Feb 2020 12:12  --------------------------------------------------------  IN:    insulin regular Infusion: 32 mL    propofol Infusion: 63.6 mL  Total IN: 95.6 mL    OUT:    Indwelling Catheter - Urethral: 325 mL  Total OUT: 325 mL    Total NET: -229.4 mL        Adult Advanced Hemodynamics Last 24 Hrs  CVP(mm Hg): --  CVP(cm H2O): --  CO: --  CI: --  PA: --  PA(mean): --  PCWP: --  SVR: --  SVRI: --  PVR: --  PVRI: --  Mode: AC/ CMV (Assist Control/ Continuous Mandatory Ventilation)  RR (machine): 26  TV (machine): 450  FiO2: 50  PEEP: 10  ITime: 1  MAP: 11  PIP: 29    CAPILLARY BLOOD GLUCOSE      POCT Blood Glucose.: 150 mg/dL (09 Feb 2020 12:08)  POCT Blood Glucose.: 180 mg/dL (09 Feb 2020 09:56)  POCT Blood Glucose.: 215 mg/dL (09 Feb 2020 07:36)  POCT Blood Glucose.: 252 mg/dL (09 Feb 2020 06:01)  POCT Blood Glucose.: 255 mg/dL (09 Feb 2020 05:12)  POCT Blood Glucose.: 289 mg/dL (09 Feb 2020 04:02)  POCT Blood Glucose.: 224 mg/dL (09 Feb 2020 03:17)  POCT Blood Glucose.: 158 mg/dL (08 Feb 2020 22:54)  POCT Blood Glucose.: 172 mg/dL (08 Feb 2020 21:35)  POCT Blood Glucose.: 177 mg/dL (08 Feb 2020 19:52)  POCT Blood Glucose.: 201 mg/dL (08 Feb 2020 18:21)  POCT Blood Glucose.: 189 mg/dL (08 Feb 2020 17:21)  POCT Blood Glucose.: 122 mg/dL (08 Feb 2020 15:30)  POCT Blood Glucose.: 112 mg/dL (08 Feb 2020 14:27)  POCT Blood Glucose.: 136 mg/dL (08 Feb 2020 13:24)  POCT Blood Glucose.: 159 mg/dL (08 Feb 2020 12:20)    LABS:  ABG - ( 09 Feb 2020 03:11 )  pH, Arterial: 7.38  pH, Blood: x     /  pCO2: 39    /  pO2: 64    / HCO3: 23    / Base Excess: -2.1  /  SaO2: 91                                      7.7    14.75 )-----------( 97       ( 09 Feb 2020 04:30 )             23.2     02-09    137  |  98  |  134<HH>  ----------------------------<  300<H>  4.2   |  21  |  4.8<HH>    Ca    7.3<L>      09 Feb 2020 04:30  Mg     2.5     02-09      PT/INR - ( 08 Feb 2020 04:00 )   PT: 19.20 sec;   INR: 1.67 ratio               Home Medications:  acetaminophen 500 mg oral tablet: 2 tab(s) orally every 8 hours (20 Jan 2020 13:38)  amLODIPine 10 mg oral tablet: 1 tab(s) orally once a day (at bedtime) (20 Jan 2020 13:38)  budesonide 3 mg oral capsule, extended release: 1 cap(s) orally 2 times a day (05 Dec 2019 17:19)  ferrous sulfate 325 mg (65 mg elemental iron) oral delayed release tablet: 1 tab(s) orally once a day (05 Dec 2019 17:19)  furosemide 20 mg oral tablet: 1 tab(s) orally once a day (05 Dec 2019 17:19)  gabapentin 300 mg oral capsule: 1 cap(s) orally once a day (at bedtime) (20 Jan 2020 13:38)  ibuprofen 400 mg oral tablet: 1 tab(s) orally every 6 hours, As needed, Moderate Pain (4 - 6) (20 Jan 2020 13:38)  ipratropium-albuterol 0.5 mg-2.5 mg/3 mLinhalation solution: 3 milliliter(s) inhaled every 6 hours, As Needed (20 Jan 2020 13:40)  lidocaine 5% topical film: Apply topically to affected area once a day (20 Jan 2020 13:38)  Metoprolol Tartrate 50 mg oral tablet: 1 tab(s) orally once a day (05 Dec 2019 17:19)  montelukast 10 mg oral tablet: 1 tab(s) orally once a day (at bedtime) (05 Dec 2019 17:19)  Multiple Vitamins with Minerals oral tablet: 1 tab(s) orally once a day (20 Jan 2020 13:38)  oxyCODONE 10 mg oral tablet: 1 tab(s) orally every 6 hours, As needed, Severe Pain (7 - 10) (20 Jan 2020 13:38)  predniSONE 20 mg oral tablet: 3 tab(s) orally once a day (20 Jan 2020 13:38)  ProAir HFA 90 mcg/inh inhalation aerosol: 1 puff(s) inhaled every 4 hours, As Needed (20 Jan 2020 13:40)  risedronate 150 mg oral tablet: 1 tab(s) orally once a month (05 Dec 2019 17:19)  tacrolimus 0.5 mg oral capsule: 1 cap(s) orally every 12 hours (05 Dec 2019 17:19)  tiotropium 18 mcg inhalation capsule: 1 cap(s) inhaled once a day, As Needed (20 Jan 2020 13:40)  Vitamin B12 1000 mcg oral tablet: 1 tab(s) orally once a day (05 Dec 2019 17:19)  Vitamin C 1000 mg oral tablet: 1 tab(s) orally once a day (05 Dec 2019 17:19)  warfarin 4 mg oral tablet: 1 tab(s) orally once a day (at bedtime) (20 Jan 2020 13:38)    HOSPITAL MEDICATIONS:  MEDICATIONS  (STANDING):  ALBUTerol    90 MICROgram(s) HFA Inhaler 1 Puff(s) Inhalation every 6 hours  calcium carbonate    500 mG (Tums) Chewable 2 Tablet(s) Chew every 8 hours  chlorhexidine 0.12% Liquid 15 milliLiter(s) Oral Mucosa every 12 hours  chlorhexidine 4% Liquid 1 Application(s) Topical <User Schedule>  cyanocobalamin 1000 MICROGram(s) Oral daily  dexMEDEtomidine Infusion 0.5 MICROgram(s)/kG/Hr (10.188 mL/Hr) IV Continuous <Continuous>  furosemide   Injectable 40 milliGRAM(s) IV Push every 12 hours  gabapentin 300 milliGRAM(s) Oral at bedtime  influenza   Vaccine 0.5 milliLiter(s) IntraMuscular once  insulin glargine Injectable (LANTUS) 50 Unit(s) SubCutaneous every morning  insulin regular Infusion 4 Unit(s)/Hr (4 mL/Hr) IV Continuous <Continuous>  ipratropium 17 MICROgram(s) HFA Inhaler 1 Puff(s) Inhalation every 6 hours  methylPREDNISolone sodium succinate Injectable 80 milliGRAM(s) IV Push every 8 hours  midazolam Infusion 0.01 mG/kG/Hr (0.815 mL/Hr) IV Continuous <Continuous>  montelukast 10 milliGRAM(s) Oral at bedtime  multivitamin/minerals 1 Tablet(s) Oral daily  mupirocin 2% Ointment 1 Application(s) Topical two times a day  norepinephrine Infusion 0.05 MICROgram(s)/kG/Min (3.534 mL/Hr) IV Continuous <Continuous>  oseltamivir 30 milliGRAM(s) Oral <User Schedule>  pantoprazole   Suspension 40 milliGRAM(s) Oral daily  propofol Infusion 10 MICROgram(s)/kG/Min (4.89 mL/Hr) IV Continuous <Continuous>  sodium bicarbonate 650 milliGRAM(s) Oral every 8 hours  tacrolimus 0.5 milliGRAM(s) Oral every 12 hours    MEDICATIONS  (PRN):      REVIEW OF SYSTEMS:      [ x ] Unable to assess ROS because intubated vent    PHYSICAL EXAM:          CONSTITUTIONAL: Well-developed; well-nourished; in no acute distress.   	SKIN: warm, dry  	HEAD: Normocephalic; atraumatic.  	EYES: PERRL, EOM, no conj injection, sclera clear  	ENT: No nasal discharge; airway clear.  	NECK: Supple; non tender.  No midline ttp ctls  	CARD: S1, S2 normal; no murmurs, gallops, or rubs. Regular rate and rhythm. 2+ RPs and DPs bilat, no carotid bruits, no pedal   edema, no calf pain b/l  	RESP: CTA  bilat good air movement No wheezes, rales or rhonchi.  	ABD: Soft, not tender, not distended, no CVA ttp no rebound or guarding, bowel sounds present  	EXT: Normal ROM.  No clubbing, cyanosis or edema.   	  	NEURO: sedated intubated        RADIOLOGY:  xray  < from: Xray Chest 1 View AP/PA (02.09.20 @ 05:42) >    IMPRESSION:    Overall, no significant change since the prior exam.    < end of copied text >    I spent 45 minutes of critical care time examining patient, reviewing vitals, labs, medications, imaging and discussing with the team goals of care to prevent life-threatening in this patient who is at high risk for deterioration or death due to:

## 2020-02-09 NOTE — PROGRESS NOTE ADULT - ASSESSMENT
IMPRESSION:    Acute hypoxic respiratory failure  DAH  Influenza A treated  on tamiflu  ARDS  off ATB levaquin /cefapim as per ID  REJI oliguric   HO Autoimmune hepatitis on tacrolimus and chronic steroids   h/o hypercoagulable state/ vte was on coumadin   Left great saphenous vein thrombosis     PLAN:    CNS:  SAT     HEENT: ET care.  Oral care     PULMONARY:  HOB @ 45 degrees. Solumedrol 80 mg q8h for now. Wean O2  PEEP 10.   FU PPl and Driving Pressure.     CARDIOVASCULAR: I=O.      GI: GI prophylaxis.  Continue feeding.       RENAL:  Follow up lytes. Replete as needed.  FU with Renal might need RRT     INFECTIOUS DISEASE: Follow up cultures.  ATB as per ID     HEMATOLOGICAL:  DVT prophylaxis.  Repeat LE Duplex in AM.  DW IR.  If progression, then IVC    ENDOCRINE:  Follow up FS.  Insulin protocol if needed.    MUSCULOSKELETAL: Bed rest     Code status: full     Prognosis: Poor prognosis     Continue MICU monitoring for now.

## 2020-02-09 NOTE — PROGRESS NOTE ADULT - ASSESSMENT
IMPRESSION:    Acute hypoxic respiratory failure- refractory- on 50% O2-vent dependenmt  DAH  Influenza A treated   ARDS  REJI oliguric   HO Autoimmune hepatitis on tacrolimus and chronic steroids   h/o hypercoagulable state/ vte was on coumadin   Left great saphenous vein thrombosis   PLAN:    CNS:  SAT     HEENT: ET care.  Oral care   PULMONARY:  HOB @ 45 degrees. Solumedrol 80 mg q8h for now. Wean O2  PEEP 10.   FU PPl and Driving Pressure.   CARDIOVASCULAR: I=O.    GI: GI prophylaxis.  Continue feeding.     RENAL:  Follow up lytes. Replete as needed.  FU with Renal might need RRT   INFECTIOUS DISEASE: Follow up cultures.  ATB as per ID   HEMATOLOGICAL:  DVT prophylaxis.  Repeat LE Duplex in AM.  DW IR.  If progression, then IVC  ENDOCRINE:  Follow up FS.  Insulin protocol if needed.  MUSCULOSKELETAL: Bed rest ; skin protection    Code status: full     Prognosis: Poor prognosis     Continue MICU monitoring for now.

## 2020-02-09 NOTE — PROGRESS NOTE ADULT - ASSESSMENT
75 y/o F with REJI in hospital for SOB, hemoptysis, cough.  PMH asthma, COPD not on home O2, autoimmune hepatitis on prednisone and tacrolimus, heterozygous prothrombin gene mutation with hx of PE and DVT on coumadin, recent hospitalization for pneumonia     # REJI/ ATN in nature/ vanco toxicity?  # non-oliguric   # creatinine up today.   # ID notes appreciated follow recs   # on FK continue for now. last level not true trough, check after dose. check tacrolimus trough 30 mintues prior to next dose / goal around 5, repeated trough remains elevated, ? need to hols FK in view of infection , if ok with GI   # last vanco trough noted elevated noted. repeat vanco level.  # ph noted , start phoslo 2 tablets po q 8  corrected calcium around 8.2, on Ca carbonate , check PTH levels   # continue sodium bicarbonate 650 q 8   # cont. lasix 40 q 12  # platelet count stable   # no acute indication for RRT  # will follow  # prognosis guarded

## 2020-02-09 NOTE — PROGRESS NOTE ADULT - SUBJECTIVE AND OBJECTIVE BOX
DONI PATRICK  74y, Female    All available historical data reviewed    OVERNIGHT EVENTS:  no fevers    ROS:  Fio2 100 %  non responsive to stimuli  on pressors  no diarrhea    VITALS:  T(F): 97, Max: 99.8 (02-08-20 @ 08:00)  HR: 114  BP: --  RR: 30Vital Signs Last 24 Hrs  T(C): 36.1 (09 Feb 2020 04:00), Max: 37.7 (08 Feb 2020 08:00)  T(F): 97 (09 Feb 2020 04:00), Max: 99.8 (08 Feb 2020 08:00)  HR: 114 (09 Feb 2020 04:00) (80 - 130)  BP: --  BP(mean): --  RR: 30 (09 Feb 2020 04:00) (25 - 44)  SpO2: 98% (09 Feb 2020 04:00) (94% - 100%)    TESTS & MEASUREMENTS:                        7.7    14.75 )-----------( 97       ( 09 Feb 2020 04:30 )             23.2     02-09    137  |  98  |  134<HH>  ----------------------------<  300<H>  4.2   |  21  |  4.8<HH>    Ca    7.3<L>      09 Feb 2020 04:30  Mg     2.5     02-09          Culture - Acid Fast - Bronchial w/Smear (collected 02-03-20 @ 15:00)  Source: .Bronchial None  Preliminary Report (02-05-20 @ 15:04):    Culture is being performed.    Culture - Fungal, Bronchial (collected 02-03-20 @ 15:00)  Source: .Bronchial None  Preliminary Report (02-04-20 @ 08:06):    Testing in progress    Culture - Bronchial (collected 02-03-20 @ 15:00)  Source: .Bronchial None  Gram Stain (02-04-20 @ 05:52):    No polymorphonuclear cells seen per low power field    No squamous epithelial cells per low power field    No organisms seen  Final Report (02-05-20 @ 19:32):    Normal Respiratory Nikki present            RADIOLOGY & ADDITIONAL TESTS:  Personal review of radiological diagnostics performed  Echo and EKG results noted when applicable.     MEDICATIONS:  ALBUTerol    90 MICROgram(s) HFA Inhaler 1 Puff(s) Inhalation every 6 hours  calcium carbonate    500 mG (Tums) Chewable 2 Tablet(s) Chew every 8 hours  chlorhexidine 0.12% Liquid 15 milliLiter(s) Oral Mucosa every 12 hours  chlorhexidine 4% Liquid 1 Application(s) Topical <User Schedule>  cyanocobalamin 1000 MICROGram(s) Oral daily  dexMEDEtomidine Infusion 0.5 MICROgram(s)/kG/Hr IV Continuous <Continuous>  furosemide   Injectable 40 milliGRAM(s) IV Push every 12 hours  gabapentin 300 milliGRAM(s) Oral at bedtime  influenza   Vaccine 0.5 milliLiter(s) IntraMuscular once  insulin regular Infusion 4 Unit(s)/Hr IV Continuous <Continuous>  ipratropium 17 MICROgram(s) HFA Inhaler 1 Puff(s) Inhalation every 6 hours  methylPREDNISolone sodium succinate Injectable 80 milliGRAM(s) IV Push every 8 hours  midazolam Infusion 0.01 mG/kG/Hr IV Continuous <Continuous>  montelukast 10 milliGRAM(s) Oral at bedtime  multivitamin/minerals 1 Tablet(s) Oral daily  mupirocin 2% Ointment 1 Application(s) Topical two times a day  norepinephrine Infusion 0.05 MICROgram(s)/kG/Min IV Continuous <Continuous>  oseltamivir 30 milliGRAM(s) Oral <User Schedule>  pantoprazole   Suspension 40 milliGRAM(s) Oral daily  propofol Infusion 10 MICROgram(s)/kG/Min IV Continuous <Continuous>  sodium bicarbonate 650 milliGRAM(s) Oral every 8 hours  tacrolimus 0.5 milliGRAM(s) Oral every 12 hours      ANTIBIOTICS:  oseltamivir 30 milliGRAM(s) Oral <User Schedule>

## 2020-02-10 NOTE — PROGRESS NOTE ADULT - ASSESSMENT
74y female with PMH of asthma, COPD not on home O2, autoimmune hepatitis on prednisone and tacrolimus, heterozygous prothrombin gene mutation with hx of PE and DVT on coumadin presents to the hospital complaining of cough, hemoptysis, SOB and desaturation to 70s. Pt was found to be flu+ likely viral PNA complicated by diffuse alveolar hemorrhage and ARDS . Hospital course complicated by DVT in L saphenous vein with possible DVT and REJI likely secondary to ATN due to vanco.      #ARDS  1-alveolar hemorrhage   2-flu +ve with post viral pneumonia resolved   3-possibel PE: cant start AC due to alveolar hemorrhage    - intubated and sedated  - s/p DDAVP 3 doses   - Solumedrol 80 q8  - Lasix 40 BID   - s/p cefepime and Levaquin (competed 7 days yesterday) - off Vanc >> kidneys toxicity  - completed oseltamivir 30 mg   - C/w Duoneb   - Bronch results - neg culture, neg fungal, cytology positive for inflammatory cells, no organisms  - SBT today    #REJI oliguric likely ATN with Vanc toxicity   - acidosis resolved, UO improved   - Cr remaines elevated   - no need RRT for now  - FK level therapeutic   - off Bicarb drip, c/w PO q8h  - Nephro following  - f/u PTH    - f/u GI about holding tacrolimus    # 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!  -f/u repeat Duplex today  -might need IVC filter depends on repeat Duplex  -not candidate for AC at this time due to alveolar hemorrhage     #Immunosuppressed: Autoimmune Hepatitis   - continue with steroid and tacrolimus  - prednisone 60 mg PO qd at home now on solumedrol   - f/u GI about holding tacrolimus   - CMV PCR neg this admission     # HTN  - holding amlodipine 10 and lopressor 50   - Monitor BPs    # heterozygous prothrombin gene mutation with hx of PE and DVT on coumadin  - holding coumadin for now due to alveolar hemorrhage   - monitor coags    #0.5 cm of GB polyp  -needs f/u US in 12 months     # Hx of asthma  - C/w montelukast qd    # GI PPx: Protonix 40 mg PO qd  # DVT PPx: sequentials to right leg only  # Activity: bedrest   # Dispo: ICU for now  # Code Status: FULL 74y female with PMH of asthma, COPD not on home O2, autoimmune hepatitis on prednisone and tacrolimus, heterozygous prothrombin gene mutation with hx of PE and DVT on coumadin presents to the hospital complaining of cough, hemoptysis, SOB and desaturation to 70s. Pt was found to be flu+ likely viral PNA complicated by diffuse alveolar hemorrhage and ARDS . Hospital course complicated by DVT in L saphenous vein with possible DVT and REJI likely secondary to ATN due to vanco.      #ARDS  1-alveolar hemorrhage   2-flu +ve with post viral pneumonia resolved   3-possibel PE: cant start AC due to alveolar hemorrhage    - intubated and sedated  - s/p DDAVP 3 doses   - Solumedrol 80 q8  - Lasix 40 BID   - s/p cefepime and Levaquin (competed 7 days yesterday) - off Vanc >> kidneys toxicity  - completed oseltamivir 30 mg   - C/w Duoneb   - Bronch results - neg culture, neg fungal, cytology positive for inflammatory cells, no organisms  - SBT today    #REJI oliguric likely ATN with Vanc toxicity   - acidosis resolved, UO improved   - Cr remaines elevated   - no need RRT for now  - FK level therapeutic   - off Bicarb drip, c/w PO q8h  - Nephro following  - f/u PTH      # 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!  -f/u repeat Duplex today  -might need IVC filter depends on repeat Duplex  -not candidate for AC at this time due to alveolar hemorrhage     #Immunosuppressed: Autoimmune Hepatitis   - continue with steroid and tacrolimus  - prednisone 60 mg PO qd at home now on solumedrol   - CMV PCR neg this admission     # HTN  - holding amlodipine 10 and lopressor 50   - Monitor BPs    # heterozygous prothrombin gene mutation with hx of PE and DVT on coumadin  - holding coumadin for now due to alveolar hemorrhage   - monitor coags    #0.5 cm of GB polyp  -needs f/u US in 12 months     # Hx of asthma  - C/w montelukast qd    # GI PPx: Protonix 40 mg PO qd  # DVT PPx: sequentials to right leg only  # Activity: bedrest   # Dispo: ICU for now  # Code Status: FULL 74y female with PMH of asthma, COPD not on home O2, autoimmune hepatitis on prednisone and tacrolimus, heterozygous prothrombin gene mutation with hx of PE and DVT on coumadin presents to the hospital complaining of cough, hemoptysis, SOB and desaturation to 70s. Pt was found to be flu+ likely viral PNA complicated by diffuse alveolar hemorrhage and ARDS . Hospital course complicated by DVT in L saphenous vein with possible DVT and REJI likely secondary to ATN due to vanco.      #ARDS  1-alveolar hemorrhage   2-flu +ve with post viral pneumonia resolved   3-possibel PE: cant start AC due to alveolar hemorrhage    - intubated and sedated  - s/p DDAVP 3 doses   - Solumedrol 80 q8  - Lasix 40 BID   - s/p cefepime and Levaquin (competed 7 days yesterday) - off Vanc >> kidneys toxicity  - completed oseltamivir 30 mg   - C/w Duoneb   - Bronch results - neg culture, neg fungal, cytology positive for inflammatory cells, no organisms  - SBT today passed however pt too lethargic to extubate, c/w MIV    #REJI oliguric likely ATN with Vanc toxicity   - acidosis resolved, UO improved   - Cr remaines elevated   - no need RRT for now  - FK level therapeutic   - off Bicarb drip, c/w PO q8h  - Nephro following  - f/u PTH      # 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!  -f/u repeat Duplex today  -might need IVC filter depends on repeat Duplex  -not candidate for AC at this time due to alveolar hemorrhage     #Immunosuppressed: Autoimmune Hepatitis   - continue with steroid and tacrolimus  - prednisone 60 mg PO qd at home now on solumedrol   - CMV PCR neg this admission     # HTN  - holding amlodipine 10 and lopressor 50   - Monitor BPs    # heterozygous prothrombin gene mutation with hx of PE and DVT on coumadin  - holding coumadin for now due to alveolar hemorrhage   - monitor coags    #0.5 cm of GB polyp  -needs f/u US in 12 months     # Hx of asthma  - C/w montelukast qd    # GI PPx: Protonix 40 mg PO qd  # DVT PPx: sequentials to right leg only  # Activity: bedrest   # Dispo: ICU for now  # Code Status: FULL

## 2020-02-10 NOTE — PROGRESS NOTE ADULT - ASSESSMENT
73 y/o F with REJI in hospital for SOB, hemoptysis, cough.  PMH asthma, COPD not on home O2, autoimmune hepatitis on prednisone and tacrolimus, heterozygous prothrombin gene mutation with hx of PE and DVT on coumadin, recent hospitalization for pneumonia   # REJI/ ATN in nature  # non oliguric   # creatinine stable   # on FK continue for now trough noted doubt true trough on low dose/ goal around 5 , repeated level remains elevated, ? need to hold tacrolimus  in view of infection , if ok with GI   # ph noted ,  corrected calcium around 8.2, on Ca carbonate , check PTH levels   # continue sodium bicarbonate 650 q 8   # continue lasix 40 q 12  # platelet count stable , h/h noted, might need blood tx   # no acute indication for RRT  # will follow  # prognosis guarded

## 2020-02-10 NOTE — PROGRESS NOTE ADULT - ASSESSMENT
IMPRESSION:    Acute hypoxic respiratory failure- refractory- on 50% O2-vent dependenmt  DAH  Influenza A treated   ARDS  REJI oliguric   HO Autoimmune hepatitis on tacrolimus and chronic steroids   h/o hypercoagulable state/ vte was on coumadin   Left great saphenous vein thrombosis   PLAN:  Plan as per critical care staff; patient has been at this level of care with poor respiratory and renal function for a number of days    HEENT: ET care.  Oral care   PULMONARY:  HOB @ 45 degrees. Solumedrol 80 mg q8h for now. Wean O2  PEEP now 8;  FU PPl and Driving Pressure.   CARDIOVASCULAR: I=O.    GI: GI prophylaxis.  Continue feeding.     RENAL:  Follow up lytes. Replete as needed.  FU with Renal might need RRT - but not at this time  INFECTIOUS DISEASE: Follow up cultures.  ATB as per ID   HEMATOLOGICAL:  DVT prophylaxis.  Repeat LE Duplex in AM.  DW IR.  If progression, then IVC  ENDOCRINE:  Follow up FS.  Insulin protocol if needed.  MUSCULOSKELETAL: Bed rest ; skin protection    Code status: full     Prognosis: Poor prognosis - We will discuss with her  and other family members decisions regarding her care in view of the possibility she may remain ventilator dependent    Continue MICU monitoring for now. With multispecialty support

## 2020-02-10 NOTE — PROGRESS NOTE ADULT - SUBJECTIVE AND OBJECTIVE BOX
Patient is a 74y old  Female who presents with a chief complaint of SOB and desaturation (10 Feb 2020 08:02)        Over Night Events:  Remains intubated on MV.  Off pressors.  Sedated.          ROS:     CONSTITUTIONAL:   no fever   no chills.  no weight gain   no weight loss    EYES:   no discharge,   no pain  no redness,   no visual changes.    ENT:   Ears: no ear pain and no hearing problems.  Nose: no nasal congestion and no nasal drainage.  Mouth/Throat: no dysphagia,  no hoarseness and no throat pain.  Neck: no lumps, no pain, no stiffness and no swollen glands.     CARDIOVASCULAR:   no chest pain,   no swelling  no palpitaions  no syncope    RESPIRATORY:  Per HPI    GASTROINTESTINAL:   no abdominal pain,   no constipation,   no diarrhea,   no vomiting.    GENITOURINARY:  no dysuria,   no frequency,   no urgency  no hematuria.    MUSCULOSKELETAL:   no back pain,   no musculoskeletal pain,  no weakness.    SKIN:   no jaundice,   no lesions,   no pruritis,   no rashes.    NEURO:   Sedated    PSYCHIATRIC:   no known mental health issues  no anxiety  no depression    ALLERGIC/IMMUNOLOGIC:   No active allergic or immunologic issues        PHYSICAL EXAM    ICU Vital Signs Last 24 Hrs  T(C): 36.6 (10 Feb 2020 04:00), Max: 36.6 (09 Feb 2020 16:00)  T(F): 97.8 (10 Feb 2020 04:00), Max: 97.8 (09 Feb 2020 16:00)  HR: 80 (10 Feb 2020 07:00) (74 - 124)  BP: --  BP(mean): --  ABP: 108/48 (10 Feb 2020 07:00) (104/50 - 162/64)  ABP(mean): 70 (10 Feb 2020 07:00) (70 - 102)  RR: 25 (10 Feb 2020 07:00) (25 - 35)  SpO2: 99% (10 Feb 2020 07:00) (97% - 100%)      CONSTITUTIONAL:   Ill appearing.  Well nourished.  NAD    ENT:   Airway patent,   Mouth with normal mucosa.   No thrush    CARDIAC:   Normal rate,   Regular rhythm.     No edema      RESPIRATORY:   NO wheezing  Bilateral BS  Normal chest expansion  Not tachypneic,  No use of accessory muscles    GASTROINTESTINAL:  Abdomen soft,   Non-tender,   No guarding,   + BS    MUSCULOSKELETAL:   Range of motion is not limited,  No clubbing, cyanosis    NEUROLOGICAL:   Sedated    SKIN:   Skin normal color for race,   warm, dry   No evidence of rash.        02-09-20 @ 07:01  -  02-10-20 @ 07:00  --------------------------------------------------------  IN:    insulin regular Infusion: 168 mL    midazolam Infusion: 43 mL    Nepro with Carb Steady: 960 mL    propofol Infusion: 412.8 mL  Total IN: 1583.8 mL    OUT:    Indwelling Catheter - Urethral: 1245 mL    Rectal Tube: 500 mL  Total OUT: 1745 mL    Total NET: -161.2 mL          LABS:                            7.0    14.80 )-----------( 101      ( 10 Feb 2020 04:25 )             21.5                                               02-10    141  |  99  |  146<HH>  ----------------------------<  147<H>  4.4   |  22  |  4.7<HH>    Ca    7.4<L>      10 Feb 2020 04:25  Mg     2.6     02-10    TPro  4.5<L>  /  Alb  2.6<L>  /  TBili  0.3  /  DBili  x   /  AST  19  /  ALT  18  /  AlkPhos  170<H>  02-10                                                                                           LIVER FUNCTIONS - ( 10 Feb 2020 04:25 )  Alb: 2.6 g/dL / Pro: 4.5 g/dL / ALK PHOS: 170 U/L / ALT: 18 U/L / AST: 19 U/L / GGT: x                                                                                               Mode: AC/ CMV (Assist Control/ Continuous Mandatory Ventilation)  RR (machine): 26  TV (machine): 450  FiO2: 70  PEEP: 10  ITime: 1  MAP: 18  PIP: 31                                      ABG - ( 10 Feb 2020 03:13 )  pH, Arterial: 7.35  pH, Blood: x     /  pCO2: 42    /  pO2: 240   / HCO3: 23    / Base Excess: -2.2  /  SaO2: 100                 MEDICATIONS  (STANDING):  ALBUTerol    90 MICROgram(s) HFA Inhaler 1 Puff(s) Inhalation every 6 hours  calcium carbonate    500 mG (Tums) Chewable 2 Tablet(s) Chew every 8 hours  chlorhexidine 0.12% Liquid 15 milliLiter(s) Oral Mucosa every 12 hours  chlorhexidine 4% Liquid 1 Application(s) Topical <User Schedule>  cyanocobalamin 1000 MICROGram(s) Oral daily  dexMEDEtomidine Infusion 0.5 MICROgram(s)/kG/Hr (10.188 mL/Hr) IV Continuous <Continuous>  furosemide   Injectable 40 milliGRAM(s) IV Push every 12 hours  gabapentin 300 milliGRAM(s) Oral at bedtime  influenza   Vaccine 0.5 milliLiter(s) IntraMuscular once  insulin glargine Injectable (LANTUS) 50 Unit(s) SubCutaneous every morning  insulin regular Infusion 4 Unit(s)/Hr (4 mL/Hr) IV Continuous <Continuous>  ipratropium 17 MICROgram(s) HFA Inhaler 1 Puff(s) Inhalation every 6 hours  methylPREDNISolone sodium succinate Injectable 80 milliGRAM(s) IV Push every 8 hours  midazolam Infusion 0.01 mG/kG/Hr (0.815 mL/Hr) IV Continuous <Continuous>  montelukast 10 milliGRAM(s) Oral at bedtime  multivitamin/minerals 1 Tablet(s) Oral daily  mupirocin 2% Ointment 1 Application(s) Topical two times a day  norepinephrine Infusion 0.05 MICROgram(s)/kG/Min (3.534 mL/Hr) IV Continuous <Continuous>  pantoprazole   Suspension 40 milliGRAM(s) Oral daily  propofol Infusion 10 MICROgram(s)/kG/Min (4.89 mL/Hr) IV Continuous <Continuous>  sodium bicarbonate 650 milliGRAM(s) Oral every 8 hours  tacrolimus 0.5 milliGRAM(s) Oral every 12 hours    MEDICATIONS  (PRN):      New X-rays reviewed:                                                                                  ECHO    CXR interpreted by me:  ET OG OK Improved infiltrates

## 2020-02-10 NOTE — PROGRESS NOTE ADULT - SUBJECTIVE AND OBJECTIVE BOX
seen and examined  intubated/ventilated  no pressors         PAST HISTORY  --------------------------------------------------------------------------------  No significant changes to PMH, PSH, FHx, SHx, unless otherwise noted    ALLERGIES & MEDICATIONS  --------------------------------------------------------------------------------  Allergies    No Known Allergies    Intolerances      Standing Inpatient Medications  ALBUTerol    90 MICROgram(s) HFA Inhaler 1 Puff(s) Inhalation every 6 hours  calcium carbonate    500 mG (Tums) Chewable 2 Tablet(s) Chew every 8 hours  chlorhexidine 0.12% Liquid 15 milliLiter(s) Oral Mucosa every 12 hours  chlorhexidine 4% Liquid 1 Application(s) Topical <User Schedule>  cyanocobalamin 1000 MICROGram(s) Oral daily  dexMEDEtomidine Infusion 0.5 MICROgram(s)/kG/Hr IV Continuous <Continuous>  furosemide   Injectable 40 milliGRAM(s) IV Push every 12 hours  gabapentin 300 milliGRAM(s) Oral at bedtime  influenza   Vaccine 0.5 milliLiter(s) IntraMuscular once  insulin glargine Injectable (LANTUS) 50 Unit(s) SubCutaneous every morning  insulin regular Infusion 4 Unit(s)/Hr IV Continuous <Continuous>  ipratropium 17 MICROgram(s) HFA Inhaler 1 Puff(s) Inhalation every 6 hours  methylPREDNISolone sodium succinate Injectable 80 milliGRAM(s) IV Push every 8 hours  midazolam Infusion 0.01 mG/kG/Hr IV Continuous <Continuous>  montelukast 10 milliGRAM(s) Oral at bedtime  multivitamin/minerals 1 Tablet(s) Oral daily  mupirocin 2% Ointment 1 Application(s) Topical two times a day  norepinephrine Infusion 0.05 MICROgram(s)/kG/Min IV Continuous <Continuous>  pantoprazole   Suspension 40 milliGRAM(s) Oral daily  propofol Infusion 10 MICROgram(s)/kG/Min IV Continuous <Continuous>  sodium bicarbonate 650 milliGRAM(s) Oral every 8 hours  tacrolimus 0.5 milliGRAM(s) Oral every 12 hours    PRN Inpatient Medications        VITALS/PHYSICAL EXAM  --------------------------------------------------------------------------------  T(C): 36.6 (02-10-20 @ 04:00), Max: 36.6 (02-09-20 @ 16:00)  HR: 80 (02-10-20 @ 07:00) (74 - 124)  BP: --  RR: 25 (02-10-20 @ 07:00) (25 - 35)  SpO2: 99% (02-10-20 @ 07:00) (97% - 100%)  Wt(kg): --        02-09-20 @ 07:01  -  02-10-20 @ 07:00  --------------------------------------------------------  IN: 1583.8 mL / OUT: 1745 mL / NET: -161.2 mL      Physical Exam:  	Gen: intubated/ventilated   	Pulm: B/L xiomara   	CV:  S1S2; no rub  	Abd: +distended  	:lory   	LE: edema      LABS/STUDIES  --------------------------------------------------------------------------------              7.0    14.80 >-----------<  101      [02-10-20 @ 04:25]              21.5     141  |  99  |  146  ----------------------------<  147      [02-10-20 @ 04:25]  4.4   |  22  |  4.7        Ca     7.4     [02-10-20 @ 04:25]      Mg     2.6     [02-10-20 @ 04:25]    TPro  4.5  /  Alb  2.6  /  TBili  0.3  /  DBili  x   /  AST  19  /  ALT  18  /  AlkPhos  170  [02-10-20 @ 04:25]          Creatinine Trend:  SCr 4.7 [02-10 @ 04:25]  SCr 4.8 [02-09 @ 04:30]  SCr 4.6 [02-08 @ 04:00]  SCr 4.6 [02-07 @ 04:45]  SCr 4.2 [02-06 @ 20:30]    Urinalysis - [01-31-20 @ 17:11]      Color Yellow / Appearance Slightly Turbid / SG 1.025 / pH 6.0      Gluc 100 mg/dL / Ketone Small  / Bili Negative / Urobili <2 mg/dL       Blood Small / Protein 100 mg/dL / Leuk Est Negative / Nitrite Negative      RBC 1 / WBC 9 / Hyaline 14 / Gran  / Sq Epi  / Non Sq Epi 8 / Bacteria Negative      HbA1c 6.9      [03-12-18 @ 04:54]  TSH 0.57      [02-01-20 @ 04:46]  Lipid: chol 203, , HDL 74,       [02-01-20 @ 04:46]      Rheumatoid Factor 59      [02-01-20 @ 04:46]  anti-GBM <1.0      [02-01-20 @ 04:46]  Free Light Chains: kappa 4.52, lambda 3.30, ratio = 1.37      [02-01 @ 04:46]  Immunofixation Serum:   No Monoclonal Band Identified    Reference Range: None Detected      [02-01-20 @ 04:46]  SPEP Interpretation: Normal Electrophoresis Pattern      [02-01-20 @ 04:46]

## 2020-02-10 NOTE — PROGRESS NOTE ADULT - ASSESSMENT
IMPRESSION:    Acute hypoxic respiratory failure  DAH  Influenza A treated   ARDS  REJI oliguric   HO Autoimmune hepatitis on tacrolimus and chronic steroids   h/o hypercoagulable state/ vte was on coumadin   Left great saphenous vein thrombosis chronic    PLAN:    CNS:  SAT     HEENT: ET care.  Oral care     PULMONARY:  HOB @ 45 degrees. Solumedrol 80 mg q12h for now. Wean O2  PEEP 10.   SBT    CARDIOVASCULAR: I=O.      GI: GI prophylaxis.  Hold feeding for SBT    RENAL:  Follow up lytes. Replete as needed.  FU with Renal     INFECTIOUS DISEASE: Follow up cultures.    HEMATOLOGICAL:  DVT prophylaxis.  Repeat LE Duplex today    ENDOCRINE:  Follow up FS.  Insulin protocol if needed.    MUSCULOSKELETAL: Bed rest     Code status: full     Prognosis: Poor prognosis     Continue MICU monitoring for now.

## 2020-02-10 NOTE — PROGRESS NOTE ADULT - SUBJECTIVE AND OBJECTIVE BOX
Med  Attending Progress Daily Note     08 Feb 2020 12:12  remains intubated on high oxygen requirement  Chart reviewed, patient examined. Pertinent results reviewed.  specialist f/u reviewed  HD#[id number]    He has history of Prothrombin gene mutation  Autoimmune hepatitis  Other chronic pulmonary embolism without acute cor pulmonale  Essential hypertension  Autoimmune hepatitis treated with steroids  Asthma  DVT (deep venous thrombosis)    Interval event for past 24 hr:  DONI PATRICK  74y had no event.   Pt remains intubated- FiO2 back on 50%,   HPI:  73y/o F w/ hx of asthma, COPD not on home O2, autoimmune hepatitis on prednisone and tacrolimus, heterozygous prothrombin gene mutation with hx of PE and DVT on coumadin with recent hospitalization in January 2020 with a diagnosis of pneumonia presented for worsening SOB, hemoptysis and cough. Everything started the night PTA when patient was in bed, started to feel shortness of breath and had many episodes of hemoptysis with clots, she also had substernal chest pain non radiating, moderate in intensity that worsens on coughing. She denies fever but endorses chills. She also admits for lightheadedness that occurred yesterday, no HA, abd pain, dysuria or paresthesias. She had 2 loose bowel movements since day PTA with some blood in the stools that she attributes to her hemorrhoids. She stopped Coumadin couple of days PTA since her INR was supratherapeutic. She is from [facility] short term and also mentions that her  and another family member have the flu and she was exposed to them. She did not have the flu shot this season.  In ED, temp was 100.1 rectally, tachycardic ( sinus) , she was saturating to 70s on non rebreather and her SaO2 went up to 95%. ABG showing respiratory alkalosis initially and then corrected after BIPAP placement and correction of RR.  CTA chest showing no PE but showing interval development of multifocal bilateral groundglass opacities as well as new moderate to severe bronchiectasis in the right lung. Findings are concerning for an acute infectious/inflammatory process. (31 Jan 2020 14:36)  Her viral testing was POS, and she has been intubated since shortly after admission. She has been on high dose oxygen for a number of days now.    ICU Vital Signs Last 24 Hrs  T(C): 36.4 (10 Feb 2020 08:00), Max: 36.6 (09 Feb 2020 16:00)  T(F): 97.5 (10 Feb 2020 08:00), Max: 97.8 (09 Feb 2020 16:00)  HR: 100 (10 Feb 2020 09:00) (74 - 124)  BP: --  BP(mean): --  ABP: 148/58 (10 Feb 2020 09:00) (104/50 - 162/64)  ABP(mean): 92 (10 Feb 2020 09:00) (70 - 102)  RR: 22 (10 Feb 2020 09:00) (22 - 35)  SpO2: 99% (10 Feb 2020 09:00) (97% - 100%)    I&O's Summary    08 Feb 2020 07:01  -  09 Feb 2020 07:00  --------------------------------------------------------  IN: 781.3 mL / OUT: 2220 mL / NET: -1438.7 mL    09 Feb 2020 07:01  -  09 Feb 2020 11:43  --------------------------------------------------------  IN: 95.6 mL / OUT: 325 mL / NET: -229.4 mL            Mode: AC/ CMV (Assist Control/ Continuous Mandatory Ventilation)  RR (machine): 26  TV (machine): 450  FiO2: 50  PEEP: 8    LABS:  ABG - ( 10 Feb 2020 08:53 )  pH, Arterial: 7.34  pH, Blood: x     /  pCO2: 45    /  pO2: 82    / HCO3: 24    / Base Excess: -1.5  /  SaO2: 95          ABG - ( 08 Feb 2020 04:56 )  pH, Arterial: 7.40  pH, Blood: x     /  pCO2: 37    /  pO2: 370   / HCO3: 23    / Base Excess: -1.8  /  SaO2: 100                               7.0    14.80 )-----------( 101      ( 10 Feb 2020 04:25 )             21.5                       7.7    14.75 )-----------( 97       ( 09 Feb 2020 04:30 )             23.2     02-10    141  |  99  |  146<HH>  ----------------------------<  147<H>  4.4   |  22  |  4.7<HH>    Ca    7.4<L>      10 Feb 2020 04:25  Mg     2.6     02-10    TPro  4.5<L>  /  Alb  2.6<L>  /  TBili  0.3  /  DBili  x   /  AST  19  /  ALT  18  /  AlkPhos  170<H>  02-10           02-09    137  |  98  |  134<HH>  ----------------------------<  300<H>  4.2   |  21  |  4.8<HH>    Ca    7.3<L>      09 Feb 2020 04:30  Mg     2.5     02-09 02-08    138  |  100  |  111<HH>  ----------------------------<  143<H>  4.6   |  20  |  4.6<HH>    Ca    7.4<L>      08 Feb 2020 04:00  Phos  5.9     02-06  Mg     2.4     02-07    TPro  4.5<L>  /  Alb  2.4<L>  /  TBili  0.4  /  DBili  x   /  AST  17  /  ALT  17  /  AlkPhos  147<H>  02-07    PT/INR - ( 08 Feb 2020 04:00 )   PT: 19.20 sec;   INR: 1.67 ratio               Home Medications:  acetaminophen 500 mg oral tablet: 2 tab(s) orally every 8 hours (20 Jan 2020 13:38)  amLODIPine 10 mg oral tablet: 1 tab(s) orally once a day (at bedtime) (20 Jan 2020 13:38)  budesonide 3 mg oral capsule, extended release: 1 cap(s) orally 2 times a day (05 Dec 2019 17:19)  ferrous sulfate 325 mg (65 mg elemental iron) oral delayed release tablet: 1 tab(s) orally once a day (05 Dec 2019 17:19)  furosemide 20 mg oral tablet: 1 tab(s) orally once a day (05 Dec 2019 17:19)  gabapentin 300 mg oral capsule: 1 cap(s) orally once a day (at bedtime) (20 Jan 2020 13:38)  ibuprofen 400 mg oral tablet: 1 tab(s) orally every 6 hours, As needed, Moderate Pain (4 - 6) (20 Jan 2020 13:38)  ipratropium-albuterol 0.5 mg-2.5 mg/3 mLinhalation solution: 3 milliliter(s) inhaled every 6 hours, As Needed (20 Jan 2020 13:40)  lidocaine 5% topical film: Apply topically to affected area once a day (20 Jan 2020 13:38)  Metoprolol Tartrate 50 mg oral tablet: 1 tab(s) orally once a day (05 Dec 2019 17:19)  montelukast 10 mg oral tablet: 1 tab(s) orally once a day (at bedtime) (05 Dec 2019 17:19)  Multiple Vitamins with Minerals oral tablet: 1 tab(s) orally once a day (20 Jan 2020 13:38)  oxyCODONE 10 mg oral tablet: 1 tab(s) orally every 6 hours, As needed, Severe Pain (7 - 10) (20 Jan 2020 13:38)  predniSONE 20 mg oral tablet: 3 tab(s) orally once a day (20 Jan 2020 13:38)  ProAir HFA 90 mcg/inh inhalation aerosol: 1 puff(s) inhaled every 4 hours, As Needed (20 Jan 2020 13:40)  risedronate 150 mg oral tablet: 1 tab(s) orally once a month (05 Dec 2019 17:19)  tacrolimus 0.5 mg oral capsule: 1 cap(s) orally every 12 hours (05 Dec 2019 17:19)  tiotropium 18 mcg inhalation capsule: 1 cap(s) inhaled once a day, As Needed (20 Jan 2020 13:40)  Vitamin B12 1000 mcg oral tablet: 1 tab(s) orally once a day (05 Dec 2019 17:19)  Vitamin C 1000 mg oral tablet: 1 tab(s) orally once a day (05 Dec 2019 17:19)  warfarin 4 mg oral tablet: 1 tab(s) orally once a day (at bedtime) (20 Jan 2020 13:38)  MEDICATIONS  (STANDING):  ALBUTerol    90 MICROgram(s) HFA Inhaler 1 Puff(s) Inhalation every 6 hours  calcium carbonate    500 mG (Tums) Chewable 2 Tablet(s) Chew every 8 hours  chlorhexidine 0.12% Liquid 15 milliLiter(s) Oral Mucosa every 12 hours  chlorhexidine 4% Liquid 1 Application(s) Topical <User Schedule>  cyanocobalamin 1000 MICROGram(s) Oral daily  dexMEDEtomidine Infusion 0.5 MICROgram(s)/kG/Hr (10.188 mL/Hr) IV Continuous <Continuous>  furosemide   Injectable 40 milliGRAM(s) IV Push every 12 hours  gabapentin 300 milliGRAM(s) Oral at bedtime  influenza   Vaccine 0.5 milliLiter(s) IntraMuscular once  insulin glargine Injectable (LANTUS) 50 Unit(s) SubCutaneous every morning  insulin regular Infusion 4 Unit(s)/Hr (4 mL/Hr) IV Continuous <Continuous>  ipratropium 17 MICROgram(s) HFA Inhaler 1 Puff(s) Inhalation every 6 hours  methylPREDNISolone sodium succinate Injectable 80 milliGRAM(s) IV Push every 8 hours  midazolam Infusion 0.01 mG/kG/Hr (0.815 mL/Hr) IV Continuous <Continuous>  montelukast 10 milliGRAM(s) Oral at bedtime  multivitamin/minerals 1 Tablet(s) Oral daily  mupirocin 2% Ointment 1 Application(s) Topical two times a day  norepinephrine Infusion 0.05 MICROgram(s)/kG/Min (3.534 mL/Hr) IV Continuous <Continuous>  oseltamivir 30 milliGRAM(s) Oral <User Schedule>  pantoprazole   Suspension 40 milliGRAM(s) Oral daily  propofol Infusion 10 MICROgram(s)/kG/Min (4.89 mL/Hr) IV Continuous <Continuous>  sodium bicarbonate 650 milliGRAM(s) Oral every 8 hours  tacrolimus 0.5 milliGRAM(s) Oral every 12 hours    MEDICATIONS  (PRN):          REVIEW OF SYSTEMS:      [ x ] Unable to assess ROS because intubated sedated    PHYSICAL EXAM:          CONSTITUTIONAL: Well-developed; well-nourished; intubated, sedated, no response to V/P stim.  	SKIN: warm, dry atrophic , damaged skin on ext.- Anasarca, and few ecchymoses and avulsions  	HEAD: Normocephalic; atraumatic.  	EYES: PERRL, EOM, no conj injection, sclera clear  	ENT: No nasal discharge; ETT and OGT  	NECK: Supple; non tender.  No midline ttp ctls  	CARD: RR, no MRG; 2+ RPs and DPs bilat, no carotid bruits, milds LE edema bilat.  	RESP: CTA  bilat good air movement No wheezes, rales or rhonchi.  	ABD: obese Soft, not tender, not distended, no CVA ttp no rebound or guarding, bowel sounds present  	EXT: Normal ROM.  No clubbing, cyanosis or edema. wraps over UE- atrophic/ecchymotic skin   	  	NEURO: sedated- no response          RADIOLOGY:          I spent 10 minutes of critical care time examining patient, reviewing vitals, labs, medications, imaging and discussing with the team goals of care to prevent life-threatening in this patient who is at high risk for deterioration or death due to:

## 2020-02-10 NOTE — PROGRESS NOTE ADULT - SUBJECTIVE AND OBJECTIVE BOX
Hospital Day:  10d    Subjective:    Pt was interviewed and examined at the bedside in the AM. No acute events overnight.   Intubated and sedated. Unable to assess ROS.       Past Medical Hx:   Prothrombin gene mutation  Autoimmune hepatitis  Other chronic pulmonary embolism without acute cor pulmonale  Essential hypertension  Autoimmune hepatitis treated with steroids  Asthma  DVT (deep venous thrombosis)    Past Sx:  S/P debridement  History of back surgery  No significant past surgical history    Allergies:  No Known Allergies    Current Meds:   Standng Meds:  ALBUTerol    90 MICROgram(s) HFA Inhaler 1 Puff(s) Inhalation every 6 hours  calcium carbonate    500 mG (Tums) Chewable 2 Tablet(s) Chew every 8 hours  chlorhexidine 0.12% Liquid 15 milliLiter(s) Oral Mucosa every 12 hours  chlorhexidine 4% Liquid 1 Application(s) Topical <User Schedule>  cyanocobalamin 1000 MICROGram(s) Oral daily  furosemide   Injectable 40 milliGRAM(s) IV Push every 12 hours  gabapentin 300 milliGRAM(s) Oral at bedtime  influenza   Vaccine 0.5 milliLiter(s) IntraMuscular once  insulin regular Infusion 4 Unit(s)/Hr (4 mL/Hr) IV Continuous <Continuous>  ipratropium 17 MICROgram(s) HFA Inhaler 1 Puff(s) Inhalation every 6 hours  methylPREDNISolone sodium succinate Injectable 80 milliGRAM(s) IV Push every 8 hours  montelukast 10 milliGRAM(s) Oral at bedtime  multivitamin/minerals 1 Tablet(s) Oral daily  mupirocin 2% Ointment 1 Application(s) Topical two times a day  norepinephrine Infusion 0.05 MICROgram(s)/kG/Min (3.534 mL/Hr) IV Continuous <Continuous>  pantoprazole   Suspension 40 milliGRAM(s) Oral daily  propofol Infusion 10 MICROgram(s)/kG/Min (4.89 mL/Hr) IV Continuous <Continuous>  sodium bicarbonate 650 milliGRAM(s) Oral every 8 hours  tacrolimus 0.5 milliGRAM(s) Oral every 12 hours    PRN Meds:    HOME MEDICATIONS:  acetaminophen 500 mg oral tablet: 2 tab(s) orally every 8 hours  amLODIPine 10 mg oral tablet: 1 tab(s) orally once a day (at bedtime)  budesonide 3 mg oral capsule, extended release: 1 cap(s) orally 2 times a day  ferrous sulfate 325 mg (65 mg elemental iron) oral delayed release tablet: 1 tab(s) orally once a day  furosemide 20 mg oral tablet: 1 tab(s) orally once a day  gabapentin 300 mg oral capsule: 1 cap(s) orally once a day (at bedtime)  ibuprofen 400 mg oral tablet: 1 tab(s) orally every 6 hours, As needed, Moderate Pain (4 - 6)  ipratropium-albuterol 0.5 mg-2.5 mg/3 mLinhalation solution: 3 milliliter(s) inhaled every 6 hours, As Needed  lidocaine 5% topical film: Apply topically to affected area once a day  Metoprolol Tartrate 50 mg oral tablet: 1 tab(s) orally once a day  montelukast 10 mg oral tablet: 1 tab(s) orally once a day (at bedtime)  Multiple Vitamins with Minerals oral tablet: 1 tab(s) orally once a day  oxyCODONE 10 mg oral tablet: 1 tab(s) orally every 6 hours, As needed, Severe Pain (7 - 10)  predniSONE 20 mg oral tablet: 3 tab(s) orally once a day  ProAir HFA 90 mcg/inh inhalation aerosol: 1 puff(s) inhaled every 4 hours, As Needed  risedronate 150 mg oral tablet: 1 tab(s) orally once a month  tacrolimus 0.5 mg oral capsule: 1 cap(s) orally every 12 hours  tiotropium 18 mcg inhalation capsule: 1 cap(s) inhaled once a day, As Needed  Vitamin B12 1000 mcg oral tablet: 1 tab(s) orally once a day  Vitamin C 1000 mg oral tablet: 1 tab(s) orally once a day  warfarin 4 mg oral tablet: 1 tab(s) orally once a day (at bedtime)      Vital Signs:   T(F): 97.5 (02-10-20 @ 08:00), Max: 97.8 (02-09-20 @ 16:00)  HR: 100 (02-10-20 @ 09:00) (74 - 124)  BP: --  RR: 22 (02-10-20 @ 09:00) (22 - 35)  SpO2: 99% (02-10-20 @ 09:00) (99% - 100%)      02-09-20 @ 07:01  -  02-10-20 @ 07:00  --------------------------------------------------------  IN: 1583.8 mL / OUT: 1745 mL / NET: -161.2 mL    02-10-20 @ 07:01  -  02-10-20 @ 11:34  --------------------------------------------------------  IN: 44.2 mL / OUT: 65 mL / NET: -20.8 mL        Physical Exam:   CONSTITUTIONAL: NAD, sedated   HEAD: Normocephalic; atraumatic, ET+ NG+   EYES: PERRL, EOMI, no conjunctival erythema  CARD: +S1, S2 Regular rate and rhythm, tacy  RESP: b/l xiomara  ABD: soft nontender, distended, no rebound, no guarding, no rigidity, henley +   EXT: moves all extremities, edema on limbs  NEURO: sedated withdraws to pain          Labs:                         7.0    14.80 )-----------( 101      ( 10 Feb 2020 04:25 )             21.5     Neutophil% 91.9, Lymphocyte% 2.0, Monocyte% 2.4, Bands% 3.6 02-10-20 @ 04:25    10 Feb 2020 04:25    141    |  99     |  146    ----------------------------<  147    4.4     |  22     |  4.7      Ca    7.4        10 Feb 2020 04:25  Mg     2.6       10 Feb 2020 04:25    TPro  4.5    /  Alb  2.6    /  TBili  0.3    /  DBili  x      /  AST  19     /  ALT  18     /  AlkPhos  170    10 Feb 2020 04:25            Serum Pro-Brain Natriuretic Peptide: 7750 pg/mL (02-06-20 @ 11:04)

## 2020-02-11 NOTE — PROGRESS NOTE ADULT - ASSESSMENT
ASSESSMENT  73y/o F w/ hx of asthma, COPD not on home O2, autoimmune hepatitis on prednisone and tacrolimus, heterozygous prothrombin gene mutation with hx of PE and DVT on coumadin with recent hospitalization in January 2020 with a diagnosis of pneumonia presents for worsening SOB, hemoptysis and cough.    IMPRESSION  #Flu + AH1, Alveolar hemorrhage, ?superimposed atypical PNA (imaging not really consistent with bacterial PNA). Recent bronch 1/20 negative for PCP, Fungal, etc, previous bronch cx with yeast (likely oral contaminant) since GMS neg    2/3 Bronch   No organisms seen, 2/3 cytopath Rare atypical cells, few ciliated bronchial cells, alveolar macrophages and inflammatory cells, mainly neutrophils.    Fungitell: <31 (02-04-20 @ 04:40), Fungitell: 49 (02-01-20 @ 11:17), Strep urine Ag neg, serum crypto Ag neg, CMV PCR undetectable, AFB neg    Quantiferon indeterminate    Lactate Dehydrogenase, Serum: 607 (02.03.20 @ 04:30)    Procalcitonin, Serum: 1.86:    CTA chest showing no PE but showing interval development of multifocal bilateral groundglass opacities as well as new moderate to severe bronchiectasis in the right lung,   1/13 CT b/l GGOs, compatible with intersitial edema      Serum Pro-Brain Natriuretic Peptide: 722 pg/mL (01.31.20 @ 09:25)<-- Serum Pro-Brain Natriuretic Peptide: 345 pg/mL (01.13.20 @ 12:10)    During last hospitalization: bronch cx bacterial NG, +yeast, pending ID, neg legionella, + MRSA PCR    MRSA PCR Result.: Positive (02-01-20 @ 20:40)  #Severe sepsis on admission P>90, WBC 19, RR>20, lactic acidosis Lactate, Blood: 2.9 mmol/L (01-31-20 @ 09:25)    afebrile   #Elevated Alk ph, transaminitis  #LLE wound, not infection     12/7 WCX MSSA, Kleb, bacteroides    8/2019 MRSA in a wound   #Immunosuppressed: autoimmune hepatitis on prednisone and tacrolimus    RECOMMENDATIONS  - Rise in WBC, afebrile, on steroids, would repeat cultures, change lines  - can repeat procalcitonin to trend (1.86)  - Trend WBC as uptrending   - completed oseltamivir  - OFF abx s/p cefepime/levaquin, if fever or hemodynamic compromise, start meropenem 500mg q24h IV  - on methylPREDNISolone sodium succinate Injectable 40    Spectra [id number]

## 2020-02-11 NOTE — CHART NOTE - NSCHARTNOTEFT_GEN_A_CORE
Registered Dietitian Follow-Up    ***Scroll to the bottom for RD recommendation***    Patient Profile Reviewed                           Yes [x]   No []  Nutrition History Previously Obtained        Yes []  No [x]          PERTINENT SUBJECTIVE INFORMATION (LATEST AS OF TODAY):  - no family at bedside  - pt remains intubated to ventilator, Ve 12.8, /69, , Temp 36.4c  - Per RN, sedation stopped 2/10, and pt passed SBT, pending possible extubation once pt becomes more alert. otherwise tolerated feed.        PERTINENT MEDICAL INFORMATIONS:  (1) P/w SOB + desat, remain on MV, off pressor and sedation. S/p CTH.  (2) Acute hypoxic resp failure, flu A treated  (3) REJI oliguric.   (4) Hx of hypercoagulable state/ vte was on coumadin.   (5) f/u electrolytes.          DIET ORDER:   NEPRO at 250cc q6hr (OGT) = 1700 kcal/ 76g protein        ANTHROPOMETRICS:  - Ht.  162.6cm  - Wt.   (2/1): 74.8kg  (2/2): 74.3kg  (2/3): 81.5kg  (2/4): 78.4kg  (2/8): 87.7kg  (2/10): 86.7kg - weight trended up likely from edema. will monitor.  - BMI. 28- 30.8  - IBW       PERTINENT LAB DATA:  2/11: h/h 8.0/23.8, , Cr 4.4, Ca 7.4, GFR 9,  PERTINENT MEDS:   insulin, labetalol, propofol, Nabicarb, protonix, vitamin D12, MVI      PHYSICAL FINDINGS  - APPEARANCE:        intubated to ventilator. 2_ generalized, 3+ b/l arm.   - GI FUNCTION:        rectal tube 2/11  - TUBES:                     OGT  - ORAL/MOUTH:      NPO  - SKIN:                        Ecchymosis        NUTRITION REQUIREMENTS  WEIGHT USED:                          ABW of 74.8kg from (2/1) = BMI: 28.5  ESTIMATED ENERGY NEEDS:       CONTINUE [  ]      ADJUST [ x ]    ESTIMATED ENERGY NEEDS:         1443 kcal/day ([id number])  ESTIMATED PROTEIN NEEDS:        67-79g/day (0.9-1.0 g/kg of ABW) - Very poor renal function considered, not on HD, intubated.   ESTIMATED FLUID NEEDS:             fluid per ICU team    CURRENT NUTRIENT NEEDS:      1700 kcal/ 76g protein  (exceeding kcal)          [x  ] PREVIOUS NUTRITION DIAGNOSIS:    (1) inadequate energy intake - ongoing            [x  ] ONGOING        [  ] RESOLVED           PATIENT INTERVENTION:    [ ] ORAL        [ x] EN/TF     GOAL/EXPECTED OUTCOME:     pt to meet and tolerate >85% of estimated kcal/protein via TF upon f/u in 3 days.   INDICATOR/MONITORING:       RD to monitor diet order, energy intake, body composition, nutrition focused physical findings (EN tolerance, renal profile)  NUTRITION INTERVENTION:        Enteral nutrition    RECS: (1) Please change formula to Osmolite 1.5 at 240ml q6hr with No-carb Prosource TF packet x1/day. This gives a total of 1460 kcal/ 70g protein/ 1710mg K/ 700mL free water, additional flushes per ICU team. (2) if patient to be extubated, consult SLP for appropriate diet order. Registered Dietitian Follow-Up    ***Scroll to the bottom for RD recommendation***    Patient Profile Reviewed                           Yes [x]   No []  Nutrition History Previously Obtained        Yes []  No [x]          PERTINENT SUBJECTIVE INFORMATION (LATEST AS OF TODAY):  - no family at bedside  - pt remains intubated to ventilator, Ve 12.8, /69, , Temp 36.4c  - Per RN, sedation stopped 2/10, and pt passed SBT, pending possible extubation once pt becomes more alert. otherwise tolerated feed.        PERTINENT MEDICAL INFORMATIONS:  (1) P/w SOB + desat, remain on MV, off pressor and sedation. S/p CTH.  (2) Acute hypoxic resp failure, flu A treated  (3) REJI oliguric.   (4) Hx of hypercoagulable state/ vte was on coumadin.   (5) f/u electrolytes.  (6) Renal consulted-  no need RRT at this time          DIET ORDER:   NEPRO at 250cc q6hr (OGT) = 1700 kcal/ 76g protein        ANTHROPOMETRICS:  - Ht.  162.6cm  - Wt.   (2/1): 74.8kg  (2/2): 74.3kg  (2/3): 81.5kg  (2/4): 78.4kg  (2/8): 87.7kg  (2/10): 86.7kg - weight trended up likely from edema. will monitor.  - BMI. 28- 30.8  - IBW       PERTINENT LAB DATA:  2/11: h/h 8.0/23.8, , Cr 4.4, Ca 7.4, GFR 9,  PERTINENT MEDS:   insulin, labetalol, propofol, Nabicarb, protonix, vitamin D12, MVI      PHYSICAL FINDINGS  - APPEARANCE:        intubated to ventilator. 2_ generalized, 3+ b/l arm.   - GI FUNCTION:        rectal tube 2/11  - TUBES:                     OGT  - ORAL/MOUTH:      NPO  - SKIN:                        Ecchymosis        NUTRITION REQUIREMENTS  WEIGHT USED:                          ABW of 74.8kg from (2/1) = BMI: 28.5  ESTIMATED ENERGY NEEDS:       CONTINUE [  ]      ADJUST [ x ]    ESTIMATED ENERGY NEEDS:         1443 kcal/day ([id number])  ESTIMATED PROTEIN NEEDS:        67-79g/day (0.9-1.0 g/kg of ABW) - Very poor renal function considered, not on HD, intubated.   ESTIMATED FLUID NEEDS:             fluid per ICU team    CURRENT NUTRIENT NEEDS:      1700 kcal/ 76g protein  (exceeding kcal)          [x  ] PREVIOUS NUTRITION DIAGNOSIS:    (1) inadequate energy intake - ongoing            [x  ] ONGOING        [  ] RESOLVED           PATIENT INTERVENTION:    [ ] ORAL        [ x] EN/TF     GOAL/EXPECTED OUTCOME:     pt to meet and tolerate >85% of estimated kcal/protein via TF upon f/u in 3 days.   INDICATOR/MONITORING:       RD to monitor diet order, energy intake, body composition, nutrition focused physical findings (EN tolerance, renal profile)  NUTRITION INTERVENTION:        Enteral nutrition    RECS: (1) Please change formula to Osmolite 1.5 at 240ml q6hr with No-carb Prosource TF packet x1/day. This gives a total of 1460 kcal/ 70g protein/ 1710mg K/ 700mL free water, additional flushes per ICU team. (2) if patient to be extubated, consult SLP for appropriate diet order.

## 2020-02-11 NOTE — PROGRESS NOTE ADULT - ASSESSMENT
75 y/o F with REJI in hospital for SOB, hemoptysis, cough.  PMH asthma, COPD not on home O2, autoimmune hepatitis on prednisone and tacrolimus, heterozygous prothrombin gene mutation with hx of PE and DVT on coumadin, recent hospitalization for pneumonia   # REJI/ ATN in nature  # non oliguric   # creatinine trending down   # on FK continue for now trough noted doubt true trough on low dose/ goal around 5 , repeated level 2.7 ? need to hold tacrolimus  in view of infection , if ok with GI   # corrected calcium around 8.2, on Ca carbonate , PTH noted , start calcitriol 0.25  # continue sodium bicarbonate 650 q 8   # no acute indication for RRT  # will follow  # prognosis guarded

## 2020-02-11 NOTE — PROGRESS NOTE ADULT - ASSESSMENT
IMPRESSION:    Acute hypoxic respiratory failure  DAH  Influenza A treated   ARDS  REJI oliguric   HO Autoimmune hepatitis on tacrolimus and chronic steroids   h/o hypercoagulable state/ vte was on coumadin   Left great saphenous vein thrombosis chronic    PLAN:    CNS:  SAT     HEENT: ET care.  Oral care     PULMONARY:  HOB @ 45 degrees. Solumedrol 60 mg q12h for now. Wean O2  PEEP 8.     CARDIOVASCULAR: I=O.      GI: GI prophylaxis.  Restart Feeding    RENAL:  Follow up lytes. Replete as needed.  FU with Renal     INFECTIOUS DISEASE: Follow up cultures.  Fungitell.  Repeat cultures.  DC TLC     HEMATOLOGICAL:  DVT prophylaxis.      ENDOCRINE:  Follow up FS.  Insulin protocol if needed.    MUSCULOSKELETAL: Bed rest     DC Hogan     Code status: full     Prognosis: Poor prognosis     Continue MICU monitoring for now.

## 2020-02-11 NOTE — PROGRESS NOTE ADULT - SUBJECTIVE AND OBJECTIVE BOX
DARNELL, DONI  75y, Female  Allergy: No Known Allergies      LOS  11d    CHIEF COMPLAINT: SOB and desaturation (11 Feb 2020 08:23)      INTERVAL EVENTS/HPI  - No acute events overnight  - T(F): , Max: 98.6 (02-10-20 @ 12:00) afebrile   - WBC Count: 24.12 (02-11-20 @ 04:50)  WBC Count: 14.80 (02-10-20 @ 04:25)  - Creatinine, Serum: 4.4 (02-11-20 @ 04:50)  Creatinine, Serum: 4.7 (02-10-20 @ 04:25)       ROS  unable to obtain history secondary to patient's mental status and/or sedation     VITALS:  T(F): 97.5, Max: 98.6 (02-10-20 @ 12:00)  HR: 102  BP: --  RR: 26Vital Signs Last 24 Hrs  T(C): 36.4 (11 Feb 2020 08:00), Max: 37 (10 Feb 2020 12:00)  T(F): 97.5 (11 Feb 2020 08:00), Max: 98.6 (10 Feb 2020 12:00)  HR: 102 (11 Feb 2020 10:00) (74 - 124)  BP: --  BP(mean): --  RR: 26 (11 Feb 2020 10:00) (21 - 42)  SpO2: 94% (11 Feb 2020 10:00) (94% - 100%)    PHYSICAL EXAM:  Gen: intubated  HEENT: Normocephalic, atraumatic  Neck: supple, no lymphadenopathy  CV: Regular rate & regular rhythm  Lungs: decreased BS at bases, no fremitus  Abdomen: Soft, BS present  Ext: Warm, well perfused, anasarca  Neuro: off sedation, opens eyes  Skin: edema/erythema, ecchymoses UE LE, open wound L knee, L wrist, pustular rash chest   Lines: no phlebitis      FH: Non-contributory  Social Hx: Non-contributory    TESTS & MEASUREMENTS:                        8.0    24.12 )-----------( 130      ( 11 Feb 2020 04:50 )             23.8     02-11    142  |  96<L>  |  166<HH>  ----------------------------<  78  4.5   |  23  |  4.4<HH>    Ca    7.4<L>      11 Feb 2020 04:50  Mg     2.6     02-10    TPro  4.5<L>  /  Alb  2.6<L>  /  TBili  0.3  /  DBili  x   /  AST  19  /  ALT  18  /  AlkPhos  170<H>  02-10    eGFR if Non African American: 9 mL/min/1.73M2 (02-11-20 @ 04:50)  eGFR if African American: 11 mL/min/1.73M2 (02-11-20 @ 04:50)    LIVER FUNCTIONS - ( 10 Feb 2020 04:25 )  Alb: 2.6 g/dL / Pro: 4.5 g/dL / ALK PHOS: 170 U/L / ALT: 18 U/L / AST: 19 U/L / GGT: x                 Lactate, Blood: 1.1 mmol/L (02-09-20 @ 04:30)  Lactate, Blood: 2.0 mmol/L (02-08-20 @ 23:29)      INFECTIOUS DISEASES TESTING  Fungitell: <31 (02-04-20 @ 04:40)  Streptococcus Pneumoniae Ag Urine: Negative (02-02-20 @ 11:00)  MRSA PCR Result.: Positive (02-01-20 @ 20:40)  Fungitell: 49 (02-01-20 @ 11:17)  Rapid RVP Result: Detected (01-31-20 @ 16:24)  Legionella Antigen, Urine: Negative (01-31-20 @ 15:36)  Streptococcus Pneumoniae Ag Urine: Negative (01-31-20 @ 15:36)  Legionella Antigen, Urine: Negative (01-20-20 @ 02:50)  MRSA PCR Result.: Positive (01-14-20 @ 13:59)  MRSA PCR Result.: Negative (08-13-19 @ 08:46)      RADIOLOGY & ADDITIONAL TESTS:  I have personally reviewed the last available Chest xray  CXR  Xray Chest 1 View AP/PA:   EXAM:  XR CHEST FRONTAL 1V            PROCEDURE DATE:  02/09/2020            INTERPRETATION:  CLINICAL HISTORY / REASON FOR EXAM: Respiratory failure    TECHNIQUE:  Frontal portable radiograph of the chest.    COMPARISON: XR CHEST 2/8/2020 performed at 10:57 PM.    FINDINGS:    SUPPORT DEVICES: Endotracheal and enteric tubes are in stable positions.  Stable right internal jugular central venous catheter. Telemetry leads    CARDIAC/MEDIASTINUM/HILUM: Unchanged.    LUNG PARENCHYMA/PLEURA: Stable bilateral pleural plaques. Bilateral lung  opacities are unchanged. No pneumothorax.    SKELETON/SOFT TISSUES: Unchanged.    IMPRESSION:    Overall, no significant change since the prior exam.                        SHYAM PAREDES M.D., ATTENDING RADIOLOGIST  This document has been electronically signed. Feb 9 2020  7:55AM             (02-09-20 @ 05:42)      CT      CARDIOLOGY TESTING  12 Lead ECG:   Ventricular Rate 118 BPM    Atrial Rate 118 BPM    P-R Interval 150 ms    QRS Duration 80 ms    Q-T Interval 326 ms    QTC Calculation(Bezet) 456 ms    P Axis 41 degrees    R Axis -10 degrees    T Axis 11 degrees    Diagnosis Line Sinus tachycardia  Possible Left atrial enlargement  Inferior infarct , age undetermined  Abnormal ECG    Confirmed by MOHSEN SONG MD ([id number]) on 1/31/2020 12:38:55 PM (01-31-20 @ 11:06)      MEDICATIONS  ALBUTerol    90 MICROgram(s) HFA Inhaler 1  calcium carbonate    500 mG (Tums) Chewable 2  chlorhexidine 0.12% Liquid 15  chlorhexidine 4% Liquid 1  cyanocobalamin 1000  gabapentin 300  influenza   Vaccine 0.5  insulin regular Infusion 4  ipratropium 17 MICROgram(s) HFA Inhaler 1  labetalol 100  methylPREDNISolone sodium succinate Injectable 40  montelukast 10  multivitamin/minerals 1  mupirocin 2% Ointment 1  pantoprazole   Suspension 40  propofol Infusion 10  sodium bicarbonate 650  tacrolimus 0.5      WEIGHT  Weight (kg): 81.5 (02-03-20 @ 12:15)    ANTIBIOTICS:      All available historical records have been reviewed

## 2020-02-11 NOTE — PROGRESS NOTE ADULT - SUBJECTIVE AND OBJECTIVE BOX
ELDER, DONI  75y  Female  HPI:  73y/o F w/ hx of asthma, COPD not on home O2, autoimmune hepatitis on prednisone and tacrolimus, heterozygous prothrombin gene mutation with hx of PE and DVT on coumadin with recent hospitalization in 2020 with a diagnosis of pneumonia presents for worsening SOB, hemoptysis and cough. Everything started yesterday night when patient was in bed, started to feel shortness of breath and had many episodes of hemoptysis with clots, she also had substernal chest pain non radiating, moderate in intensity that worsens on coughing. She denies fever but endorses chills. She also admits for lightheadedness that occurred yesterday, no HA, abd pain, dysuria or paresthesias. She had 2 loose bowel movements since yesterday with some blood in the stools that she attributes to her hemorrhoids. She stopped Coumadin couple of days ago since her INR was supratherapeutic. She is from [facility] short term and also mentions that her  and another family member have the flu and she was exposed to them. She did not have the flu shot this season.  In ED, temp was 100.1 rectally, tachycardic ( sinus) , she was saturating to 70s on non rebreather and her SaO2 went up to 95%. ABG showing respiratory alkalosis initially and then corrected after BIPAP placement and correction of RR.  CTA chest showing no PE but showing interval development of multifocal bilateral groundglass opacities as well as new moderate to severe bronchiectasis in the right lung. Findings are concerning for an acute infectious/inflammatory process. (2020 14:36)    MEDICATIONS  (STANDING):  ALBUTerol    90 MICROgram(s) HFA Inhaler 1 Puff(s) Inhalation every 6 hours  calcitriol  Solution 0.25 MICROGram(s) Oral daily  calcium carbonate    500 mG (Tums) Chewable 2 Tablet(s) Chew every 8 hours  chlorhexidine 0.12% Liquid 15 milliLiter(s) Oral Mucosa every 12 hours  chlorhexidine 4% Liquid 1 Application(s) Topical <User Schedule>  cyanocobalamin 1000 MICROGram(s) Oral daily  gabapentin 300 milliGRAM(s) Oral at bedtime  influenza   Vaccine 0.5 milliLiter(s) IntraMuscular once  insulin regular Infusion 4 Unit(s)/Hr (4 mL/Hr) IV Continuous <Continuous>  ipratropium 17 MICROgram(s) HFA Inhaler 1 Puff(s) Inhalation every 6 hours  labetalol 100 milliGRAM(s) Oral every 12 hours  methylPREDNISolone sodium succinate Injectable 60 milliGRAM(s) IV Push two times a day  montelukast 10 milliGRAM(s) Oral at bedtime  multivitamin/minerals 1 Tablet(s) Oral daily  mupirocin 2% Ointment 1 Application(s) Topical two times a day  pantoprazole   Suspension 40 milliGRAM(s) Oral daily  propofol Infusion 10 MICROgram(s)/kG/Min (4.89 mL/Hr) IV Continuous <Continuous>  sodium bicarbonate 650 milliGRAM(s) Oral every 8 hours  tacrolimus 0.5 milliGRAM(s) Oral every 12 hours    MEDICATIONS  (PRN):    INTERVAL EVENTS: Patient seen today on her 75th birthday. Patient remains vented, sedation held, opens eyes?    T(C): 36.8 (20 @ 20:00), Max: 36.8 (20 @ 20:00)  HR: 100 (20 @ 21:00) (74 - 120)  BP: --  RR: 27 (20 @ 21:00) (21 - 42)  SpO2: 100% (20 @ 21:00) (93% - 100%)  Wt(kg): --Vital Signs Last 24 Hrs  T(C): 36.8 (2020 20:00), Max: 36.8 (2020 20:00)  T(F): 98.3 (2020 20:00), Max: 98.3 (2020 20:00)  HR: 100 (2020 21:00) (74 - 120)  BP: --  BP(mean): --  RR: 27 (2020 21:00) (21 - 42)  SpO2: 100% (:00) (93% - 100%)    PHYSICAL EXAM:  GENERAL: NAD  NECK: Supple, No JVD  CHEST/LUNG: Course BS  HEART: S1, S2, Regular rate and rhythm  ABDOMEN: Soft, Nontender,  Bowel sounds present  EXTREMITIES: ++ edema  SKIN: Thin skin, open skin tears    LABS:                        8.0    24.12 )-----------( 130      ( 2020 04:50 )             23.8             02-11    142  |  96<L>  |  166<HH>  ----------------------------<  78  4.5   |  23  |  4.4<HH>    Ca    7.4<L>      2020 04:50  Mg     2.6     10    TPro  4.5<L>  /  Alb  2.6<L>  /  TBili  0.3  /  DBili  x   /  AST  19  /  ALT  18  /  AlkPhos  170<H>  10    LIVER FUNCTIONS - ( 10 Feb 2020 04:25 )  Alb: 2.6 g/dL / Pro: 4.5 g/dL / ALK PHOS: 170 U/L / ALT: 18 U/L / AST: 19 U/L / GGT: x           ABG - ( 2020 13:11 )  pH, Arterial: 7.43  pH, Blood: x     /  pCO2: 37    /  pO2: 83    / HCO3: 25    / Base Excess: 0.5   /  SaO2: 96          Urinalysis Basic - ( 2020 19:38 )    Color: Yellow / Appearance: Slightly Turbid / S.012 / pH: x  Gluc: x / Ketone: Negative  / Bili: Negative / Urobili: <2 mg/dL   Blood: x / Protein: 30 mg/dL / Nitrite: Negative   Leuk Esterase: Large / RBC: 23 /HPF /  /HPF   Sq Epi: x / Non Sq Epi: 0 /HPF / Bacteria: Negative      RADIOLOGY & ADDITIONAL TESTS:  < from: Xray Chest 1 View-PORTABLE IMMEDIATE (20 @ 13:53) >  Impression:   1. Status-post placement of new left jugular vein central venous catheter; the catheter tip is at the junction of the left innominate vein and superior vena cava.  Interval removal of previously placed right jugular vein central venous catheter.  2.  Stable, bilateral interstitial opacities and calcified plaque..        < end of copied text >

## 2020-02-11 NOTE — PROGRESS NOTE ADULT - ASSESSMENT
74y female with PMH of asthma, COPD not on home O2, autoimmune hepatitis on prednisone and tacrolimus, heterozygous prothrombin gene mutation with hx of PE and DVT on coumadin presents to the hospital complaining of cough, hemoptysis, SOB and desaturation to 70s. Pt was found to be flu+ likely viral PNA complicated by diffuse alveolar hemorrhage and ARDS . Hospital course complicated by DVT in L saphenous vein with possible DVT and REJI likely secondary to ATN due to vanco.      #ARDS  1-alveolar hemorrhage   2-flu +ve with post viral pneumonia resolved   3-possibel PE: cant start AC due to alveolar hemorrhage    - intubated and sedated  - s/p DDAVP 3 doses   - Solumedrol 40 q12  - Lasix 40   - s/p cefepime and Levaquin (competed 7 days yesterday) - off Vanc >> kidneys toxicity  - completed oseltamivir 30 mg   - C/w Duoneb   - Bronch results - neg culture, neg fungal, cytology positive for inflammatory cells, no organisms  - c/w MIV, monitor for improved mentation to extubate    #Leukocytosis, Afebrile   - Exchanged CVC  - pancx   - fungitell  - procalcitonin   - trend CBC   - if hemodynamic instability or febrile start on Jorge 500g q24     #REJI oliguric likely ATN with Vanc toxicity   - acidosis resolved, UO improved   - Cr remaines elevated   - no need RRT for now  - FK level therapeutic   - c/w PO Bicarb q8h  - Nephro following  - PTH elevated       # 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!  -f/u repeat Duplex   -might need IVC filter depends on repeat Duplex  -not candidate for AC at this time due to alveolar hemorrhage     #Immunosuppressed: Autoimmune Hepatitis   - continue with steroid and tacrolimus  - prednisone 60 mg PO qd at home now on solumedrol 40  - CMV PCR neg this admission     # HTN  - holding amlodipine 10 and lopressor 50   - Monitor BPs    # heterozygous prothrombin gene mutation with hx of PE and DVT on coumadin  - holding coumadin for now due to alveolar hemorrhage   - monitor coags    #0.5 cm of GB polyp  -needs f/u US in 12 months     # Hx of asthma  - C/w montelukast qd    # GI PPx: Protonix 40 mg PO qd  # DVT PPx: sequentials to right leg only  # Activity: bedrest   # Dispo: ICU  # Code Status: FULL 74y female with PMH of asthma, COPD not on home O2, autoimmune hepatitis on prednisone and tacrolimus, heterozygous prothrombin gene mutation with hx of PE and DVT on coumadin presents to the hospital complaining of cough, hemoptysis, SOB and desaturation to 70s. Pt was found to be flu+ likely viral PNA complicated by diffuse alveolar hemorrhage and ARDS . Hospital course complicated by DVT in L saphenous vein with possible DVT and REJI likely secondary to ATN due to vanco.      #ARDS  1-alveolar hemorrhage   2-flu +ve with post viral pneumonia resolved   3-possibel PE: cant start AC due to alveolar hemorrhage    - intubated and sedated  - s/p DDAVP 3 doses   - Solumedrol 40 q12  - Lasix 40   - s/p cefepime and Levaquin (competed 7 days yesterday) - off Vanc >> kidneys toxicity  - completed oseltamivir 30 mg   - C/w Duoneb   - Bronch results - neg culture, neg fungal, cytology positive for inflammatory cells, no organisms  - c/w MIV, monitor for improved mentation to extubate    #Leukocytosis, Afebrile   - Exchanged CVC  - pancx   - fungitell  - procalcitonin   - trend CBC   - if hemodynamic instability or febrile start on Jorge 500g q24     #REJI oliguric likely ATN with Vanc toxicity   - acidosis resolved, UO improved   - Cr remaines elevated   - no need RRT for now  - FK level therapeutic   - c/w PO Bicarb q8h  - Nephro following  - PTH elevated     - calcitriol 0.25    # 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!  -f/u repeat Duplex   -might need IVC filter depends on repeat Duplex  -not candidate for AC at this time due to alveolar hemorrhage     #Immunosuppressed: Autoimmune Hepatitis   - continue with steroid and tacrolimus  - prednisone 60 mg PO qd at home now on solumedrol 40  - CMV PCR neg this admission     # HTN  - holding amlodipine 10 and lopressor 50   - Monitor BPs    # heterozygous prothrombin gene mutation with hx of PE and DVT on coumadin  - holding coumadin for now due to alveolar hemorrhage   - monitor coags    #0.5 cm of GB polyp  -needs f/u US in 12 months     # Hx of asthma  - C/w montelukast qd    # GI PPx: Protonix 40 mg PO qd  # DVT PPx: sequentials to right leg only  # Activity: bedrest   # Dispo: ICU  # Code Status: FULL

## 2020-02-11 NOTE — PROGRESS NOTE ADULT - SUBJECTIVE AND OBJECTIVE BOX
Patient is a 75y old  Female who presents with a chief complaint of SOB and desaturation (10 Feb 2020 11:34)        Over Night Events:  Remains on MV.  Off pressors.  Off sedation fort 24 hours.  SP CTH        ROS:     CONSTITUTIONAL:   no fever   no chills.  no weight gain   no weight loss    EYES:   no discharge,   no pain  no redness,   no visual changes.    ENT:   Ears: no ear pain and no hearing problems.  Nose: no nasal congestion and no nasal drainage.  Mouth/Throat: no dysphagia,  no hoarseness and no throat pain.  Neck: no lumps, no pain, no stiffness and no swollen glands.     CARDIOVASCULAR:   no chest pain,   no swelling  no palpitaions  no syncope    RESPIRATORY:  Per HPI    GASTROINTESTINAL:   no abdominal pain,   no constipation,   no diarrhea,   no vomiting.    GENITOURINARY:  no dysuria,   no frequency,   no urgency  no hematuria.    MUSCULOSKELETAL:   no back pain,   no musculoskeletal pain,  no weakness.    SKIN:   no jaundice,   no lesions,   no pruritis,   no rashes.    NEURO:   Per HPOI    PSYCHIATRIC:   no known mental health issues  no anxiety  no depression    ALLERGIC/IMMUNOLOGIC:   No active allergic or immunologic issues        PHYSICAL EXAM    ICU Vital Signs Last 24 Hrs  T(C): 36.4 (11 Feb 2020 04:00), Max: 37 (10 Feb 2020 12:00)  T(F): 97.5 (11 Feb 2020 04:00), Max: 98.6 (10 Feb 2020 12:00)  HR: 80 (11 Feb 2020 07:00) (80 - 124)  BP: --  BP(mean): --  ABP: 138/62 (11 Feb 2020 07:00) (126/58 - 172/72)  ABP(mean): 92 (11 Feb 2020 07:00) (82 - 114)  RR: 25 (11 Feb 2020 07:00) (22 - 42)  SpO2: 100% (11 Feb 2020 07:00) (98% - 100%)      CONSTITUTIONAL:   Ill appearing.  Well nourished.  NAD    ENT:   Airway patent,   Mouth with normal mucosa.   No thrush    EYES:   Pupils equal,   Round and reactive to light.    CARDIAC:   Normal rate,   Regular rhythm.    No edema      Vascular:  Normal systolic impulse  No Carotid bruits    RESPIRATORY:   No wheezing  Bilateral BS  Normal chest expansion  Not tachypneic,  No use of accessory muscles    GASTROINTESTINAL:  Abdomen soft,   Non-tender,   No guarding,   + BS    GENITOURINARY  normal genitalia for sex   edema    MUSCULOSKELETAL:   Range of motion is not limited,  No muscle or joint tenderness  No clubbing, cyanosis    NEUROLOGICAL:   Opens eyes to verbal.      SKIN:   Skin normal color for race,   Warm   No evidence of rash.    PSYCHIATRIC:   Normal mood and affect.   No apparent risk to self or others.    HEME LYMPH:   No cervical  lymphadenopathy.  no inguinal lymphadenopathy      02-10-20 @ 07:01  -  02-11-20 @ 07:00  --------------------------------------------------------  IN:    Enteral Tube Flush: 130 mL    insulin regular Infusion: 74 mL    midazolam Infusion: 2 mL    propofol Infusion: 68.4 mL  Total IN: 274.4 mL    OUT:    Indwelling Catheter - Urethral: 1725 mL    Rectal Tube: 200 mL  Total OUT: 1925 mL    Total NET: -1650.6 mL          LABS:                            8.0    24.12 )-----------( 130      ( 11 Feb 2020 04:50 )             23.8                                               02-11    142  |  96<L>  |  166<HH>  ----------------------------<  78  4.5   |  23  |  4.4<HH>    11 Feb 2020 04:50    142    |  96<L>  |  166<HH>  ----------------------------<  78     4.5     |  23     |  4.4<HH>  10 Feb 2020 04:25    141    |  99     |  146<HH>  ----------------------------<  147<H>  4.4     |  22     |  4.7<HH>    Ca    7.4<L>      11 Feb 2020 04:50  Ca    7.4<L>      10 Feb 2020 04:25  Mg     2.6<H>     10 Feb 2020 04:25    TPro  4.5<L>  /  Alb  2.6<L>  /  TBili  0.3    /  DBili  x      /  AST  19     /  ALT  18     /  AlkPhos  170<H>  10 Feb 2020 04:25      Ca    7.4<L>      11 Feb 2020 04:50  Mg     2.6     02-10    TPro  4.5<L>  /  Alb  2.6<L>  /  TBili  0.3  /  DBili  x   /  AST  19  /  ALT  18  /  AlkPhos  170<H>  02-10                                                                                           LIVER FUNCTIONS - ( 10 Feb 2020 04:25 )  Alb: 2.6 g/dL / Pro: 4.5 g/dL / ALK PHOS: 170 U/L / ALT: 18 U/L / AST: 19 U/L / GGT: x                                                                                               Mode: AC/ CMV (Assist Control/ Continuous Mandatory Ventilation)  RR (machine): 26  TV (machine): 450  FiO2: 50  PEEP: 8  ITime: 1  MAP: 15  PIP: 28                                      ABG - ( 11 Feb 2020 03:09 )  pH, Arterial: 7.43  pH, Blood: x     /  pCO2: 38    /  pO2: 134   / HCO3: 26    / Base Excess: 1.4   /  SaO2: 99                  MEDICATIONS  (STANDING):  ALBUTerol    90 MICROgram(s) HFA Inhaler 1 Puff(s) Inhalation every 6 hours  calcium carbonate    500 mG (Tums) Chewable 2 Tablet(s) Chew every 8 hours  chlorhexidine 0.12% Liquid 15 milliLiter(s) Oral Mucosa every 12 hours  chlorhexidine 4% Liquid 1 Application(s) Topical <User Schedule>  cyanocobalamin 1000 MICROGram(s) Oral daily  furosemide   Injectable 40 milliGRAM(s) IV Push every 12 hours  gabapentin 300 milliGRAM(s) Oral at bedtime  influenza   Vaccine 0.5 milliLiter(s) IntraMuscular once  insulin regular Infusion 4 Unit(s)/Hr (4 mL/Hr) IV Continuous <Continuous>  ipratropium 17 MICROgram(s) HFA Inhaler 1 Puff(s) Inhalation every 6 hours  labetalol 100 milliGRAM(s) Oral every 12 hours  methylPREDNISolone sodium succinate Injectable 40 milliGRAM(s) IV Push every 12 hours  montelukast 10 milliGRAM(s) Oral at bedtime  multivitamin/minerals 1 Tablet(s) Oral daily  mupirocin 2% Ointment 1 Application(s) Topical two times a day  pantoprazole   Suspension 40 milliGRAM(s) Oral daily  propofol Infusion 10 MICROgram(s)/kG/Min (4.89 mL/Hr) IV Continuous <Continuous>  sodium bicarbonate 650 milliGRAM(s) Oral every 8 hours  tacrolimus 0.5 milliGRAM(s) Oral every 12 hours    MEDICATIONS  (PRN):      New X-rays reviewed:                                                                                  ECHO    CXR interpreted by me:  SOLO JULIEN OK.  Improved infiltrates

## 2020-02-11 NOTE — PROGRESS NOTE ADULT - ASSESSMENT
73 y/o female with pmhx of asthma, HTN,  autoimmune hepatitis, heterozygous prothrombin gene mutation with hx of PE and DVT on coumadin admitted for SOB     Acute hypoxic resp failure with hypoxia due to influenza and HCAP, Septic shock in immunocompromised patient  - PE? given thrombosis, not anticoagulated due to aveolar hemmorhage  - remains intubated, sedation held, vent settings decreased  - remains on IV Solu-medrol  - antibiotics and antiviral discontinued/completed  - continue vent support with PEEP decreased down to 8, Duoneb q6h and q4h PRN  - ID and pulm f/u appreciated    REJI on CKD 3  - holding Lasix and home BP medications  - on Bicarb  - creatinine trending down  - renal following  - urine output increased with Lasix dose  - continue to monitor    Autoimmune hepatitis  - On prednisone 60 mg PO qd and Tacrolimus at home  - now on Solu-medrol  - remains Tacrolimus     HTN  - hypotensive on pressors  - medications held    Heterozygous prothrombin gene mutation with hx of PE and DVT on coumadin:  - Coumadin had been held for suspected alveolar hemorrhage     Hx of asthma  - on Montelukast qd    Multiple Skin tears/ wounds  - local care    Diet: tolerating enteral feedings  GI PPx: Protonix 40 mg PO qd  DVT PPx: sequentials  Activity: bedrest  Dispo: readmitted to hospital from  rehab at [facility], ICU for now

## 2020-02-11 NOTE — PROGRESS NOTE ADULT - SUBJECTIVE AND OBJECTIVE BOX
Hospital Day:  11d    Subjective:    Pt was interviewed and examined at the bedside in the AM. No acute events overnight.   Intubated, lethargic but responsive. Unable to assess ROS.       Past Medical Hx:   Prothrombin gene mutation  Autoimmune hepatitis  Other chronic pulmonary embolism without acute cor pulmonale  Essential hypertension  Autoimmune hepatitis treated with steroids  Asthma  DVT (deep venous thrombosis)    Past Sx:  S/P debridement  History of back surgery  No significant past surgical history    Allergies:  No Known Allergies    Current Meds:   Standng Meds:  ALBUTerol    90 MICROgram(s) HFA Inhaler 1 Puff(s) Inhalation every 6 hours  calcium carbonate    500 mG (Tums) Chewable 2 Tablet(s) Chew every 8 hours  chlorhexidine 0.12% Liquid 15 milliLiter(s) Oral Mucosa every 12 hours  chlorhexidine 4% Liquid 1 Application(s) Topical <User Schedule>  cyanocobalamin 1000 MICROGram(s) Oral daily  gabapentin 300 milliGRAM(s) Oral at bedtime  influenza   Vaccine 0.5 milliLiter(s) IntraMuscular once  insulin regular Infusion 4 Unit(s)/Hr (4 mL/Hr) IV Continuous <Continuous>  ipratropium 17 MICROgram(s) HFA Inhaler 1 Puff(s) Inhalation every 6 hours  labetalol 100 milliGRAM(s) Oral every 12 hours  methylPREDNISolone sodium succinate Injectable 40 milliGRAM(s) IV Push every 12 hours  montelukast 10 milliGRAM(s) Oral at bedtime  multivitamin/minerals 1 Tablet(s) Oral daily  mupirocin 2% Ointment 1 Application(s) Topical two times a day  pantoprazole   Suspension 40 milliGRAM(s) Oral daily  propofol Infusion 10 MICROgram(s)/kG/Min (4.89 mL/Hr) IV Continuous <Continuous>  sodium bicarbonate 650 milliGRAM(s) Oral every 8 hours  tacrolimus 0.5 milliGRAM(s) Oral every 12 hours    PRN Meds:    HOME MEDICATIONS:  acetaminophen 500 mg oral tablet: 2 tab(s) orally every 8 hours  amLODIPine 10 mg oral tablet: 1 tab(s) orally once a day (at bedtime)  budesonide 3 mg oral capsule, extended release: 1 cap(s) orally 2 times a day  ferrous sulfate 325 mg (65 mg elemental iron) oral delayed release tablet: 1 tab(s) orally once a day  furosemide 20 mg oral tablet: 1 tab(s) orally once a day  gabapentin 300 mg oral capsule: 1 cap(s) orally once a day (at bedtime)  ibuprofen 400 mg oral tablet: 1 tab(s) orally every 6 hours, As needed, Moderate Pain (4 - 6)  ipratropium-albuterol 0.5 mg-2.5 mg/3 mLinhalation solution: 3 milliliter(s) inhaled every 6 hours, As Needed  lidocaine 5% topical film: Apply topically to affected area once a day  Metoprolol Tartrate 50 mg oral tablet: 1 tab(s) orally once a day  montelukast 10 mg oral tablet: 1 tab(s) orally once a day (at bedtime)  Multiple Vitamins with Minerals oral tablet: 1 tab(s) orally once a day  oxyCODONE 10 mg oral tablet: 1 tab(s) orally every 6 hours, As needed, Severe Pain (7 - 10)  predniSONE 20 mg oral tablet: 3 tab(s) orally once a day  ProAir HFA 90 mcg/inh inhalation aerosol: 1 puff(s) inhaled every 4 hours, As Needed  risedronate 150 mg oral tablet: 1 tab(s) orally once a month  tacrolimus 0.5 mg oral capsule: 1 cap(s) orally every 12 hours  tiotropium 18 mcg inhalation capsule: 1 cap(s) inhaled once a day, As Needed  Vitamin B12 1000 mcg oral tablet: 1 tab(s) orally once a day  Vitamin C 1000 mg oral tablet: 1 tab(s) orally once a day  warfarin 4 mg oral tablet: 1 tab(s) orally once a day (at bedtime)      Vital Signs:   T(F): 97.4 (02-11-20 @ 12:00), Max: 97.9 (02-10-20 @ 20:00)  HR: 92 (02-11-20 @ 12:00) (74 - 124)  BP: --  RR: 33 (02-11-20 @ 12:00) (21 - 42)  SpO2: 93% (02-11-20 @ 12:00) (93% - 100%)      02-10-20 @ 07:01  -  02-11-20 @ 07:00  --------------------------------------------------------  IN: 274.4 mL / OUT: 1925 mL / NET: -1650.6 mL    02-11-20 @ 07:01  -  02-11-20 @ 13:35  --------------------------------------------------------  IN: 546 mL / OUT: 400 mL / NET: 146 mL        Physical Exam:   CONSTITUTIONAL: NAD  HEAD: Normocephalic; atraumatic, ET+ NG+   EYES: PERRL, EOMI, no conjunctival erythema  CARD: +S1, S2 Regular rate and rhythm, tacy  RESP: b/l xiomara  ABD: soft nontender, distended, no rebound, no guarding, no rigidity, henley +   EXT: edema on limbs, wounds on b/l arms   NEURO: opens eyes, follows commands, to weak to move limbs     Labs:                         8.0    24.12 )-----------( 130      ( 11 Feb 2020 04:50 )             23.8     Neutophil% 89.3, Lymphocyte% 2.5, Monocyte% 3.6, Bands% 4.4 02-11-20 @ 04:50    11 Feb 2020 04:50    142    |  96     |  166    ----------------------------<  78     4.5     |  23     |  4.4      Ca    7.4        11 Feb 2020 04:50  Mg     2.6       10 Feb 2020 04:25    TPro  4.5    /  Alb  2.6    /  TBili  0.3    /  DBili  x      /  AST  19     /  ALT  18     /  AlkPhos  170    10 Feb 2020 04:25    Serum Pro-Brain Natriuretic Peptide: 7750 pg/mL (02-06-20 @ 11:04) Hospital Day:  11d    Subjective:    Pt was interviewed and examined at the bedside in the AM. No acute events overnight.   Intubated, lethargic but responsive. Unable to assess ROS.       Past Medical Hx:   Prothrombin gene mutation  Autoimmune hepatitis  Other chronic pulmonary embolism without acute cor pulmonale  Essential hypertension  Autoimmune hepatitis treated with steroids  Asthma  DVT (deep venous thrombosis)    Past Sx:  S/P debridement  History of back surgery  No significant past surgical history    Allergies:  No Known Allergies    Current Meds:   Standng Meds:  ALBUTerol    90 MICROgram(s) HFA Inhaler 1 Puff(s) Inhalation every 6 hours  calcium carbonate    500 mG (Tums) Chewable 2 Tablet(s) Chew every 8 hours  chlorhexidine 0.12% Liquid 15 milliLiter(s) Oral Mucosa every 12 hours  chlorhexidine 4% Liquid 1 Application(s) Topical <User Schedule>  cyanocobalamin 1000 MICROGram(s) Oral daily  gabapentin 300 milliGRAM(s) Oral at bedtime  influenza   Vaccine 0.5 milliLiter(s) IntraMuscular once  insulin regular Infusion 4 Unit(s)/Hr (4 mL/Hr) IV Continuous <Continuous>  ipratropium 17 MICROgram(s) HFA Inhaler 1 Puff(s) Inhalation every 6 hours  labetalol 100 milliGRAM(s) Oral every 12 hours  methylPREDNISolone sodium succinate Injectable 40 milliGRAM(s) IV Push every 12 hours  montelukast 10 milliGRAM(s) Oral at bedtime  multivitamin/minerals 1 Tablet(s) Oral daily  mupirocin 2% Ointment 1 Application(s) Topical two times a day  pantoprazole   Suspension 40 milliGRAM(s) Oral daily  propofol Infusion 10 MICROgram(s)/kG/Min (4.89 mL/Hr) IV Continuous <Continuous>  sodium bicarbonate 650 milliGRAM(s) Oral every 8 hours  tacrolimus 0.5 milliGRAM(s) Oral every 12 hours    PRN Meds:    HOME MEDICATIONS:  acetaminophen 500 mg oral tablet: 2 tab(s) orally every 8 hours  amLODIPine 10 mg oral tablet: 1 tab(s) orally once a day (at bedtime)  budesonide 3 mg oral capsule, extended release: 1 cap(s) orally 2 times a day  ferrous sulfate 325 mg (65 mg elemental iron) oral delayed release tablet: 1 tab(s) orally once a day  furosemide 20 mg oral tablet: 1 tab(s) orally once a day  gabapentin 300 mg oral capsule: 1 cap(s) orally once a day (at bedtime)  ibuprofen 400 mg oral tablet: 1 tab(s) orally every 6 hours, As needed, Moderate Pain (4 - 6)  ipratropium-albuterol 0.5 mg-2.5 mg/3 mLinhalation solution: 3 milliliter(s) inhaled every 6 hours, As Needed  lidocaine 5% topical film: Apply topically to affected area once a day  Metoprolol Tartrate 50 mg oral tablet: 1 tab(s) orally once a day  montelukast 10 mg oral tablet: 1 tab(s) orally once a day (at bedtime)  Multiple Vitamins with Minerals oral tablet: 1 tab(s) orally once a day  oxyCODONE 10 mg oral tablet: 1 tab(s) orally every 6 hours, As needed, Severe Pain (7 - 10)  predniSONE 20 mg oral tablet: 3 tab(s) orally once a day  ProAir HFA 90 mcg/inh inhalation aerosol: 1 puff(s) inhaled every 4 hours, As Needed  risedronate 150 mg oral tablet: 1 tab(s) orally once a month  tacrolimus 0.5 mg oral capsule: 1 cap(s) orally every 12 hours  tiotropium 18 mcg inhalation capsule: 1 cap(s) inhaled once a day, As Needed  Vitamin B12 1000 mcg oral tablet: 1 tab(s) orally once a day  Vitamin C 1000 mg oral tablet: 1 tab(s) orally once a day  warfarin 4 mg oral tablet: 1 tab(s) orally once a day (at bedtime)      Vital Signs:   T(F): 97.4 (02-11-20 @ 12:00), Max: 97.9 (02-10-20 @ 20:00)  HR: 92 (02-11-20 @ 12:00) (74 - 124)  BP: --  RR: 33 (02-11-20 @ 12:00) (21 - 42)  SpO2: 93% (02-11-20 @ 12:00) (93% - 100%)      02-10-20 @ 07:01  -  02-11-20 @ 07:00  --------------------------------------------------------  IN: 274.4 mL / OUT: 1925 mL / NET: -1650.6 mL    02-11-20 @ 07:01  -  02-11-20 @ 13:35  --------------------------------------------------------  IN: 546 mL / OUT: 400 mL / NET: 146 mL        Physical Exam:   CONSTITUTIONAL: NAD  HEAD: Normocephalic; atraumatic, ET+ NG+   EYES: PERRL, EOMI, no conjunctival erythema  CARD: +S1, S2 Regular rate and rhythm, tacy  RESP: b/l xiomara  ABD: soft nontender, distended, no rebound, no guarding, no rigidity,   EXT: edema on limbs, wounds on b/l arms   NEURO: opens eyes, follows commands, to weak to move limbs   Lines: L ZUNILDA and F Donna    Labs:                         8.0    24.12 )-----------( 130      ( 11 Feb 2020 04:50 )             23.8     Neutophil% 89.3, Lymphocyte% 2.5, Monocyte% 3.6, Bands% 4.4 02-11-20 @ 04:50    11 Feb 2020 04:50    142    |  96     |  166    ----------------------------<  78     4.5     |  23     |  4.4      Ca    7.4        11 Feb 2020 04:50  Mg     2.6       10 Feb 2020 04:25    TPro  4.5    /  Alb  2.6    /  TBili  0.3    /  DBili  x      /  AST  19     /  ALT  18     /  AlkPhos  170    10 Feb 2020 04:25    Serum Pro-Brain Natriuretic Peptide: 7750 pg/mL (02-06-20 @ 11:04)

## 2020-02-11 NOTE — PROGRESS NOTE ADULT - SUBJECTIVE AND OBJECTIVE BOX
seen and examined  intubated/ventilated         PAST HISTORY  --------------------------------------------------------------------------------  No significant changes to PMH, PSH, FHx, SHx, unless otherwise noted    ALLERGIES & MEDICATIONS  --------------------------------------------------------------------------------  Allergies    No Known Allergies    Intolerances      Standing Inpatient Medications  ALBUTerol    90 MICROgram(s) HFA Inhaler 1 Puff(s) Inhalation every 6 hours  calcium carbonate    500 mG (Tums) Chewable 2 Tablet(s) Chew every 8 hours  chlorhexidine 0.12% Liquid 15 milliLiter(s) Oral Mucosa every 12 hours  chlorhexidine 4% Liquid 1 Application(s) Topical <User Schedule>  cyanocobalamin 1000 MICROGram(s) Oral daily  gabapentin 300 milliGRAM(s) Oral at bedtime  influenza   Vaccine 0.5 milliLiter(s) IntraMuscular once  insulin regular Infusion 4 Unit(s)/Hr IV Continuous <Continuous>  ipratropium 17 MICROgram(s) HFA Inhaler 1 Puff(s) Inhalation every 6 hours  labetalol 100 milliGRAM(s) Oral every 12 hours  methylPREDNISolone sodium succinate Injectable 40 milliGRAM(s) IV Push every 12 hours  montelukast 10 milliGRAM(s) Oral at bedtime  multivitamin/minerals 1 Tablet(s) Oral daily  mupirocin 2% Ointment 1 Application(s) Topical two times a day  pantoprazole   Suspension 40 milliGRAM(s) Oral daily  propofol Infusion 10 MICROgram(s)/kG/Min IV Continuous <Continuous>  sodium bicarbonate 650 milliGRAM(s) Oral every 8 hours  tacrolimus 0.5 milliGRAM(s) Oral every 12 hours        VITALS/PHYSICAL EXAM  --------------------------------------------------------------------------------  T(C): 36.4 (02-11-20 @ 04:00), Max: 37 (02-10-20 @ 12:00)  HR: 74 (02-11-20 @ 07:30) (74 - 124)  BP: --  RR: 25 (02-11-20 @ 07:00) (22 - 42)  SpO2: 100% (02-11-20 @ 07:30) (98% - 100%)  Wt(kg): --        02-10-20 @ 07:01  -  02-11-20 @ 07:00  --------------------------------------------------------  IN: 274.4 mL / OUT: 1925 mL / NET: -1650.6 mL      Physical Exam:  	Gen: intubated/ventilated   	Pulm:  B/L xiomara   	CV:  S1S2; no rub  	Abd: soft, nontender/nondistended  	LE:  edema      LABS/STUDIES  --------------------------------------------------------------------------------              8.0    24.12 >-----------<  130      [02-11-20 @ 04:50]              23.8     142  |  96  |  166  ----------------------------<  78      [02-11-20 @ 04:50]  4.5   |  23  |  4.4        Ca     7.4     [02-11-20 @ 04:50]      Mg     2.6     [02-10-20 @ 04:25]    TPro  4.5  /  Alb  2.6  /  TBili  0.3  /  DBili  x   /  AST  19  /  ALT  18  /  AlkPhos  170  [02-10-20 @ 04:25]          Creatinine Trend:  SCr 4.4 [02-11 @ 04:50]  SCr 4.7 [02-10 @ 04:25]  SCr 4.8 [02-09 @ 04:30]  SCr 4.6 [02-08 @ 04:00]  SCr 4.6 [02-07 @ 04:45]    Urinalysis - [01-31-20 @ 17:11]      Color Yellow / Appearance Slightly Turbid / SG 1.025 / pH 6.0      Gluc 100 mg/dL / Ketone Small  / Bili Negative / Urobili <2 mg/dL       Blood Small / Protein 100 mg/dL / Leuk Est Negative / Nitrite Negative      RBC 1 / WBC 9 / Hyaline 14 / Gran  / Sq Epi  / Non Sq Epi 8 / Bacteria Negative      PTH -- (Ca 7.3)      [02-10-20 @ 18:00]   398  HbA1c 6.9      [03-12-18 @ 04:54]  TSH 0.57      [02-01-20 @ 04:46]  Lipid: chol 203, , HDL 74,       [02-01-20 @ 04:46]      Rheumatoid Factor 59      [02-01-20 @ 04:46]  anti-GBM <1.0      [02-01-20 @ 04:46]  Free Light Chains: kappa 4.52, lambda 3.30, ratio = 1.37      [02-01 @ 04:46]  Immunofixation Serum:   No Monoclonal Band Identified    Reference Range: None Detected      [02-01-20 @ 04:46]  SPEP Interpretation: Normal Electrophoresis Pattern      [02-01-20 @ 04:46]

## 2020-02-12 NOTE — PROGRESS NOTE ADULT - SUBJECTIVE AND OBJECTIVE BOX
Nephrology progress note    THIS IS AN INCOMPLETE NOTE . FULL NOTE TO FOLLOW SHORTLY    Patient is seen and examined, events over the last 24 h noted .    Allergies:  No Known Allergies    Hospital Medications:   MEDICATIONS  (STANDING):  ALBUTerol    90 MICROgram(s) HFA Inhaler 1 Puff(s) Inhalation every 6 hours  calcitriol  Solution 0.25 MICROGram(s) Oral daily  calcium carbonate    500 mG (Tums) Chewable 2 Tablet(s) Chew every 8 hours  chlorhexidine 0.12% Liquid 15 milliLiter(s) Oral Mucosa every 12 hours  chlorhexidine 4% Liquid 1 Application(s) Topical <User Schedule>  cyanocobalamin 1000 MICROGram(s) Oral daily  gabapentin 300 milliGRAM(s) Oral at bedtime  influenza   Vaccine 0.5 milliLiter(s) IntraMuscular once  insulin regular Infusion 4 Unit(s)/Hr (4 mL/Hr) IV Continuous <Continuous>  ipratropium 17 MICROgram(s) HFA Inhaler 1 Puff(s) Inhalation every 6 hours  labetalol 100 milliGRAM(s) Oral every 12 hours  lactated ringers. 1000 milliLiter(s) (100 mL/Hr) IV Continuous <Continuous>  methylPREDNISolone sodium succinate Injectable 60 milliGRAM(s) IV Push daily  montelukast 10 milliGRAM(s) Oral at bedtime  multivitamin/minerals 1 Tablet(s) Oral daily  mupirocin 2% Ointment 1 Application(s) Topical two times a day  pantoprazole   Suspension 40 milliGRAM(s) Oral daily  propofol Infusion 10 MICROgram(s)/kG/Min (4.89 mL/Hr) IV Continuous <Continuous>  sodium bicarbonate 650 milliGRAM(s) Oral every 8 hours  tacrolimus 0.5 milliGRAM(s) Oral every 12 hours        VITALS:  T(F): 97.8 (20 @ 04:00), Max: 98.3 (20 @ 20:00)  HR: 84 (20 @ 08:18)  BP: --  RR: 34 (20 @ 07:00)  SpO2: 98% (20 @ 08:18)  Wt(kg): --    02-10 @ 07:01  -   @ 07:00  --------------------------------------------------------  IN: 274.4 mL / OUT: 1925 mL / NET: -1650.6 mL     @ 07:01  -   @ 07:00  --------------------------------------------------------  IN: 1573 mL / OUT: 5 mL / NET: -492 mL      2020 07:  -  2020 07:00  --------------------------------------------------------  IN:    Enteral Tube Flush: 140 mL    insulin regular Infusion: 203 mL    Nepro with Carb Steady: 480 mL    Osmolite: 750 mL  Total IN: 1573 mL    OUT:    Indwelling Catheter - Urethral: 1965 mL    Rectal Tube: 100 mL  Total OUT: 5 mL    Total NET: -492 mL            PHYSICAL EXAM:  Constitutional: NAD  HEENT: anicteric sclera, oropharynx clear, MMM  Neck: No JVD  Respiratory: CTAB, no wheezes, rales or rhonchi  Cardiovascular: S1, S2, RRR  Gastrointestinal: BS+, soft, NT/ND  Extremities: No cyanosis or clubbing. No peripheral edema  :  No henley.   Skin: No rashes    LABS:      144  |  97<L>  |  184<HH>  ----------------------------<  128<H>  4.2   |  24  |  3.9<H>    Ca    6.9<L>      2020 04:50  Phos  7.9       Mg     2.7         Creatinine Trend: 3.9<--, 4.4<--, 4.7<--, 4.8<--, 4.6<--, 4.6<--                        7.1    21.46 )-----------( 143      ( 2020 04:50 )             21.3       Urine Studies:  Urinalysis Basic - ( 2020 19:38 )    Color: Yellow / Appearance: Slightly Turbid / S.012 / pH:   Gluc:  / Ketone: Negative  / Bili: Negative / Urobili: <2 mg/dL   Blood:  / Protein: 30 mg/dL / Nitrite: Negative   Leuk Esterase: Large / RBC: 23 /HPF /  /HPF   Sq Epi:  / Non Sq Epi: 0 /HPF / Bacteria: Negative        RADIOLOGY & ADDITIONAL STUDIES: Nephrology progress note  Patient is seen and examined, events over the last 24 h noted .  still intubated on MV  Non oliguric     Allergies:  No Known Allergies    Hospital Medications:   MEDICATIONS  (STANDING):  ALBUTerol    90 MICROgram(s) HFA Inhaler 1 Puff(s) Inhalation every 6 hours  calcitriol  Solution 0.25 MICROGram(s) Oral daily  calcium carbonate    500 mG (Tums) Chewable 2 Tablet(s) Chew every 8 hours  cyanocobalamin 1000 MICROGram(s) Oral daily  gabapentin 300 milliGRAM(s) Oral at bedtime  influenza   Vaccine 0.5 milliLiter(s) IntraMuscular once  insulin regular Infusion 4 Unit(s)/Hr (4 mL/Hr) IV Continuous <Continuous>  ipratropium 17 MICROgram(s) HFA Inhaler 1 Puff(s) Inhalation every 6 hours  labetalol 100 milliGRAM(s) Oral every 12 hours  lactated ringers. 1000 milliLiter(s) (100 mL/Hr) IV Continuous <Continuous>  methylPREDNISolone sodium succinate Injectable 60 milliGRAM(s) IV Push daily  montelukast 10 milliGRAM(s) Oral at bedtime  multivitamin/minerals 1 Tablet(s) Oral daily  mupirocin 2% Ointment 1 Application(s) Topical two times a day  pantoprazole   Suspension 40 milliGRAM(s) Oral daily  propofol Infusion 10 MICROgram(s)/kG/Min (4.89 mL/Hr) IV Continuous <Continuous>  sodium bicarbonate 650 milliGRAM(s) Oral every 8 hours  tacrolimus 0.5 milliGRAM(s) Oral every 12 hours        VITALS:  T(F): 97.8 (20 @ 04:00), Max: 98.3 (20 @ 20:00)  HR: 84 (20 @ 08:18)  BP: 105/55  RR: 34 (20 @ 07:00)  SpO2: 98% (20 @ 08:18)  Wt(kg): --    02-10 @ 07:01  -   @ 07:00  --------------------------------------------------------  IN: 274.4 mL / OUT: 1925 mL / NET: -1650.6 mL     @ 07:01  -   @ 07:00  --------------------------------------------------------  IN: 1573 mL / OUT: 5 mL / NET: -492 mL      2020 07:01  -  2020 07:00  --------------------------------------------------------  IN:    Enteral Tube Flush: 140 mL    insulin regular Infusion: 203 mL    Nepro with Carb Steady: 480 mL    Osmolite: 750 mL  Total IN: 1573 mL    OUT:    Indwelling Catheter - Urethral: 1965 mL    Rectal Tube: 100 mL  Total OUT: 5 mL    Total NET: -492 mL            PHYSICAL EXAM:  Constitutional: intubated on MV   HEENT: anicteric sclera, oropharynx clear, MMM  Neck: No JVD  Respiratory: CTAB, no wheezes, rales or rhonchi  Cardiovascular: S1, S2, RRR  Gastrointestinal: BS+, soft, NT/ND  Extremities: No cyanosis or clubbing. No peripheral edema  :  No henley.   Skin: No rashes    LABS:      144  |  97<L>  |  184<HH>  ----------------------------<  128<H>  4.2   |  24  |  3.9<H>    Ca    6.9<L>      2020 04:50  Phos  7.9       Mg     2.7         Creatinine Trend: 3.9<--, 4.4<--, 4.7<--, 4.8<--, 4.6<--, 4.6<--                        7.1    21.46 )-----------( 143      ( 2020 04:50 )             21.3     Tacrolimus (), Serum: 2.7: Tacrolimus testing is performed on the [employer]  by  chemiluminescent microparticle immunoassay. The therapeutic range of  tacrolimus is not clearly defined but target 12-hour trough whole blood  concentrations are 5.0 - 20.0 ng/mL early post transplant. Twenty-four  hour  trough concentrations are 33-50% less than the corresponding 12-hour  trough. ng/mL (02.10.20 @ 04:25)      Urine Studies:  Urinalysis Basic - ( 2020 19:38 )    Color: Yellow / Appearance: Slightly Turbid / S.012 / pH:   Gluc:  / Ketone: Negative  / Bili: Negative / Urobili: <2 mg/dL   Blood:  / Protein: 30 mg/dL / Nitrite: Negative   Leuk Esterase: Large / RBC: 23 /HPF /  /HPF   Sq Epi:  / Non Sq Epi: 0 /HPF / Bacteria: Negative        RADIOLOGY & ADDITIONAL STUDIES:

## 2020-02-12 NOTE — PROGRESS NOTE ADULT - SUBJECTIVE AND OBJECTIVE BOX
DARNELL, DONI  75y, Female  Allergy: No Known Allergies      LOS  12d    CHIEF COMPLAINT: SOB and desaturation (2020 08:46)      INTERVAL EVENTS/HPI  - No acute events overnight  - T(F): , Max: 98.3 (20 @ 20:00)  - WBC Count: 21.46 (20 @ 04:50) <--WBC Count: 24.12 (20 @ 04:50)  - Creatinine, Serum: 3.9 (20 @ 04:50)  Creatinine, Serum: 4.4 (20 @ 04:50)       ROS  General: Denies rigors, nightsweats  HEENT: Denies headache, rhinorrhea, sore throat, eye pain  CV: Denies CP, palpitations  PULM: Denies wheezing, hemoptysis  GI: Denies hematemesis, hematochezia, melena  : Denies discharge, hematuria  MSK: Denies arthralgias, myalgias  SKIN: Denies rash, lesions  NEURO: Denies paresthesias, weakness  PSYCH: Denies depression, anxiety    VITALS:  T(F): 97.5, Max: 98.3 (20 @ 20:00)  HR: 90  BP: --  RR: 22Vital Signs Last 24 Hrs  T(C): 36.4 (2020 08:18), Max: 36.8 (2020 20:00)  T(F): 97.5 (2020 08:18), Max: 98.3 (2020 20:00)  HR: 90 (2020 09:00) (84 - 120)  BP: --  BP(mean): --  RR: 22 (2020 09:00) (22 - 34)  SpO2: 98% (2020 09:00) (93% - 100%)    PHYSICAL EXAM:  Gen: intubated  HEENT: Normocephalic, atraumatic  Neck: supple, no lymphadenopathy  CV: Regular rate & regular rhythm  Lungs: decreased BS at bases, no fremitus  Abdomen: Soft, BS present  Ext: Warm, well perfused, anasarca  Neuro: off sedation, opens eyes  Skin: edema/erythema, ecchymoses UE LE, open wound L knee, L wrist  Lines: no phlebitis    FH: Non-contributory  Social Hx: Non-contributory    TESTS & MEASUREMENTS:                        7.1    21.46 )-----------( 143      ( 2020 04:50 )             21.3     02    144  |  97<L>  |  184<HH>  ----------------------------<  128<H>  4.2   |  24  |  3.9<H>    Ca    6.9<L>      2020 04:50  Phos  7.9       Mg     2.7           eGFR if Non African American: 11 mL/min/1.73M2 (20 @ 04:50)  eGFR if African American: 12 mL/min/1.73M2 (20 @ 04:50)      Urinalysis Basic - ( 2020 19:38 )    Color: Yellow / Appearance: Slightly Turbid / S.012 / pH: x  Gluc: x / Ketone: Negative  / Bili: Negative / Urobili: <2 mg/dL   Blood: x / Protein: 30 mg/dL / Nitrite: Negative   Leuk Esterase: Large / RBC: 23 /HPF /  /HPF   Sq Epi: x / Non Sq Epi: 0 /HPF / Bacteria: Negative          Lactate, Blood: 1.1 mmol/L (20 @ 04:30)  Lactate, Blood: 2.0 mmol/L (20 @ 23:29)      INFECTIOUS DISEASES TESTING  Fungitell: <31 (20 @ 04:40)  Streptococcus Pneumoniae Ag Urine: Negative (20 @ 11:00)  MRSA PCR Result.: Positive (20 @ 20:40)  Fungitell: 49 (20 @ 11:17)  Rapid RVP Result: Detected (20 @ 16:24)  Legionella Antigen, Urine: Negative (20 @ 15:36)  Streptococcus Pneumoniae Ag Urine: Negative (20 @ 15:36)  Legionella Antigen, Urine: Negative (20 @ 02:50)  MRSA PCR Result.: Positive (20 @ 13:59)  MRSA PCR Result.: Negative (19 @ 08:46)      RADIOLOGY & ADDITIONAL TESTS:  I have personally reviewed the last available Chest xray  CXR      CT      CARDIOLOGY TESTING  12 Lead ECG:   Ventricular Rate 118 BPM    Atrial Rate 118 BPM    P-R Interval 150 ms    QRS Duration 80 ms    Q-T Interval 326 ms    QTC Calculation(Bezet) 456 ms    P Axis 41 degrees    R Axis -10 degrees    T Axis 11 degrees    Diagnosis Line Sinus tachycardia  Possible Left atrial enlargement  Inferior infarct , age undetermined  Abnormal ECG    Confirmed by MOHSEN SONG MD ([id number]) on 2020 12:38:55 PM (20 @ 11:06)      MEDICATIONS  ALBUTerol    90 MICROgram(s) HFA Inhaler 1  calcitriol  Solution 0.25  calcium carbonate    500 mG (Tums) Chewable 2  chlorhexidine 0.12% Liquid 15  chlorhexidine 4% Liquid 1  cyanocobalamin 1000  gabapentin 300  influenza   Vaccine 0.5  insulin regular Infusion 4  ipratropium 17 MICROgram(s) HFA Inhaler 1  labetalol 100  lactated ringers. 1000  methylPREDNISolone sodium succinate Injectable 60  montelukast 10  multivitamin/minerals 1  mupirocin 2% Ointment 1  pantoprazole   Suspension 40  propofol Infusion 10  sodium bicarbonate 650  tacrolimus 0.5      WEIGHT  Weight (kg): 81.5 (20 @ 12:15)    ANTIBIOTICS:      All available historical records have been reviewed

## 2020-02-12 NOTE — PROGRESS NOTE ADULT - SUBJECTIVE AND OBJECTIVE BOX
Hospital Day:  12d    Subjective:    Pt was interviewed and examined at the bedside in the AM. No acute events overnight.   Intubated. Unable to assess ROS. Lethargic but opens eyes. Does not follow commands.       Past Medical Hx:   Prothrombin gene mutation  Autoimmune hepatitis  Other chronic pulmonary embolism without acute cor pulmonale  Essential hypertension  Autoimmune hepatitis treated with steroids  Asthma  DVT (deep venous thrombosis)    Past Sx:  S/P debridement  History of back surgery  No significant past surgical history    Allergies:  No Known Allergies    Current Meds:   Standng Meds:  ALBUTerol    90 MICROgram(s) HFA Inhaler 1 Puff(s) Inhalation every 6 hours  calcitriol  Solution 0.25 MICROGram(s) Oral daily  calcium carbonate    500 mG (Tums) Chewable 2 Tablet(s) Chew every 8 hours  chlorhexidine 0.12% Liquid 15 milliLiter(s) Oral Mucosa every 12 hours  chlorhexidine 4% Liquid 1 Application(s) Topical <User Schedule>  cyanocobalamin 1000 MICROGram(s) Oral daily  gabapentin 300 milliGRAM(s) Oral at bedtime  influenza   Vaccine 0.5 milliLiter(s) IntraMuscular once  insulin regular Infusion 4 Unit(s)/Hr (4 mL/Hr) IV Continuous <Continuous>  ipratropium 17 MICROgram(s) HFA Inhaler 1 Puff(s) Inhalation every 6 hours  labetalol 100 milliGRAM(s) Oral every 12 hours  lactated ringers. 1000 milliLiter(s) (100 mL/Hr) IV Continuous <Continuous>  methylPREDNISolone sodium succinate Injectable 60 milliGRAM(s) IV Push daily  montelukast 10 milliGRAM(s) Oral at bedtime  multivitamin/minerals 1 Tablet(s) Oral daily  mupirocin 2% Ointment 1 Application(s) Topical two times a day  pantoprazole   Suspension 40 milliGRAM(s) Oral daily  propofol Infusion 10 MICROgram(s)/kG/Min (4.89 mL/Hr) IV Continuous <Continuous>  sodium bicarbonate 650 milliGRAM(s) Oral every 8 hours  tacrolimus 0.5 milliGRAM(s) Oral every 12 hours    PRN Meds:    HOME MEDICATIONS:  acetaminophen 500 mg oral tablet: 2 tab(s) orally every 8 hours  amLODIPine 10 mg oral tablet: 1 tab(s) orally once a day (at bedtime)  budesonide 3 mg oral capsule, extended release: 1 cap(s) orally 2 times a day  ferrous sulfate 325 mg (65 mg elemental iron) oral delayed release tablet: 1 tab(s) orally once a day  furosemide 20 mg oral tablet: 1 tab(s) orally once a day  gabapentin 300 mg oral capsule: 1 cap(s) orally once a day (at bedtime)  ibuprofen 400 mg oral tablet: 1 tab(s) orally every 6 hours, As needed, Moderate Pain (4 - 6)  ipratropium-albuterol 0.5 mg-2.5 mg/3 mLinhalation solution: 3 milliliter(s) inhaled every 6 hours, As Needed  lidocaine 5% topical film: Apply topically to affected area once a day  Metoprolol Tartrate 50 mg oral tablet: 1 tab(s) orally once a day  montelukast 10 mg oral tablet: 1 tab(s) orally once a day (at bedtime)  Multiple Vitamins with Minerals oral tablet: 1 tab(s) orally once a day  oxyCODONE 10 mg oral tablet: 1 tab(s) orally every 6 hours, As needed, Severe Pain (7 - 10)  predniSONE 20 mg oral tablet: 3 tab(s) orally once a day  ProAir HFA 90 mcg/inh inhalation aerosol: 1 puff(s) inhaled every 4 hours, As Needed  risedronate 150 mg oral tablet: 1 tab(s) orally once a month  tacrolimus 0.5 mg oral capsule: 1 cap(s) orally every 12 hours  tiotropium 18 mcg inhalation capsule: 1 cap(s) inhaled once a day, As Needed  Vitamin B12 1000 mcg oral tablet: 1 tab(s) orally once a day  Vitamin C 1000 mg oral tablet: 1 tab(s) orally once a day  warfarin 4 mg oral tablet: 1 tab(s) orally once a day (at bedtime)      Vital Signs:   T(F): 97.5 (20 @ 08:18), Max: 98.3 (20 @ 20:00)  HR: 90 (20 @ 09:00) (84 - 120)  BP: --  RR: 22 (20 @ 09:00) (22 - 34)  SpO2: 98% (20 @ 09:00) (93% - 100%)      20 @ 07:01  -  20 @ 07:00  --------------------------------------------------------  IN: 1573 mL / OUT: 2065 mL / NET: -492 mL    20 @ 07:01  -  20 @ 11:29  --------------------------------------------------------  IN: 103 mL / OUT: 45 mL / NET: 58 mL        Physical Exam:   CONSTITUTIONAL: NAD, letharfiv   HEAD: Normocephalic; atraumatic, ET+ NG+   EYES: PERRL, EOMI, no conjunctival erythema  CARD: +S1, S2 Regular rate and rhythm, tachy  RESP: b/l ronchi  ABD: soft nontender, distended, no rebound, no guarding, no rigidity,   EXT: edema on limbs, wounds on b/l arms   NEURO: opens eyes, does not follows commands, to weak to move limbs   Lines: L IJ and F [place]        Labs:                         7.1    21.46 )-----------( 143      ( 2020 04:50 )             21.3     Neutophil% 91.4, Lymphocyte% 2.0, Monocyte% 2.7, Bands% 3.7 20 @ 04:50    2020 04:50    144    |  97     |  184    ----------------------------<  128    4.2     |  24     |  3.9      Ca    6.9        2020 04:50  Phos  7.9       2020 04:50  Mg     2.7       2020 04:50              Serum Pro-Brain Natriuretic Peptide: 7750 pg/mL (20 @ 11:04)          Urinalysis Basic - ( 2020 19:38 )    Color: Yellow / Appearance: Slightly Turbid / S.012 / pH: x  Gluc: x / Ketone: Negative  / Bili: Negative / Urobili: <2 mg/dL   Blood: x / Protein: 30 mg/dL / Nitrite: Negative   Leuk Esterase: Large / RBC: 23 /HPF /  /HPF   Sq Epi: x / Non Sq Epi: 0 /HPF / Bacteria: Negative            Radiology:

## 2020-02-12 NOTE — CONSULT NOTE ADULT - ASSESSMENT
Assessment:     75y/o F w/ hx of asthma, COPD not on home O2, autoimmune hepatitis on prednisone and tacrolimus, heterozygous prothrombin gene mutation with hx of PE and DVT on coumadin (currently being held due to suspected alveolar hemorrhage) with recent hospitalization in January 2020 with a diagnosis of pneumonia presents for worsening SOB, hemoptysis and cough x 1 day.  Patient was intubated and admitted to MICU for Acute hypoxic resp failure with hypoxia due to influenza and HCAP w/ Septic shock in immunocompromised patient. Patient was found to be flu positive, likely viral PNA complicated by suspected diffuse alveolar hemorrhage (anticoagulation currently held) and ARDS.  Hospital course complicated by DVT in L saphenous vein and REJI (on CKD stage 3) likely secondary to ATN due to Vancomycin dosing.      Patient is currently still intubated and has been weaned off sedations x 48hours. On exam, patient is able to open her eyes spontaneously, however, has difficulty keeping them out.  She is unable to follow commands and does not grimace, react or retract from painful stimulus.      Her presentation is likely 2/2 to slow metabolism of multiple day dosing of Versed, given patient's compromised hepatic and kidney function.     Recommendations:   -rEEG   -if patient continues to be sedated and unresponsive for 48hours, consider MRI Brain     attending note to follow.

## 2020-02-12 NOTE — PROGRESS NOTE ADULT - ASSESSMENT
ASSESSMENT  73y/o F w/ hx of asthma, COPD not on home O2, autoimmune hepatitis on prednisone and tacrolimus, heterozygous prothrombin gene mutation with hx of PE and DVT on coumadin with recent hospitalization in January 2020 with a diagnosis of pneumonia presents for worsening SOB, hemoptysis and cough.    IMPRESSION  #Leukocytosis, downtrending; afebrile   #Flu + AH1, Alveolar hemorrhage, ?superimposed atypical PNA (imaging not really consistent with bacterial PNA). Recent bronch 1/20 negative for PCP, Fungal, etc, previous bronch cx with yeast (likely oral contaminant) since GMS neg    2/3 Bronch   No organisms seen, 2/3 cytopath Rare atypical cells, few ciliated bronchial cells, alveolar macrophages and inflammatory cells, mainly neutrophils.    Fungitell: <31 (02-04-20 @ 04:40), Fungitell: 49 (02-01-20 @ 11:17), Strep urine Ag neg, serum crypto Ag neg, CMV PCR undetectable, AFB neg    Quantiferon indeterminate    Lactate Dehydrogenase, Serum: 607 (02.03.20 @ 04:30)    Procalcitonin, Serum: 1.86:    CTA chest showing no PE but showing interval development of multifocal bilateral groundglass opacities as well as new moderate to severe bronchiectasis in the right lung,   1/13 CT b/l GGOs, compatible with intersitial edema      Serum Pro-Brain Natriuretic Peptide: 722 pg/mL (01.31.20 @ 09:25)<-- Serum Pro-Brain Natriuretic Peptide: 345 pg/mL (01.13.20 @ 12:10)    During last hospitalization: bronch cx bacterial NG, +yeast, pending ID, neg legionella, + MRSA PCR    MRSA PCR Result.: Positive (02-01-20 @ 20:40)  #Severe sepsis on admission P>90, WBC 19, RR>20, lactic acidosis Lactate, Blood: 2.9 mmol/L (01-31-20 @ 09:25)    afebrile   #Elevated Alk ph, transaminitis  #LLE wound, not infection     12/7 WCX MSSA, Kleb, bacteroides    8/2019 MRSA in a wound   #Immunosuppressed: autoimmune hepatitis on prednisone and tacrolimus    RECOMMENDATIONS  - f/u repeat cx   - can repeat procalcitonin to trend (1.86)   - Trend WBC  - completed oseltamivir  - OFF abx s/p cefepime/levaquin, if fever or hemodynamic compromise, start meropenem 500mg q24h IV  - on methylPREDNISolone sodium succinate Injectable 60    Spectra [id number]

## 2020-02-12 NOTE — CONSULT NOTE ADULT - SUBJECTIVE AND OBJECTIVE BOX
Neurology Consult    HPI, as per chart review, as patient is unable to engage in interview:    73y/o F w/ hx of asthma, COPD not on home O2, autoimmune hepatitis on prednisone and tacrolimus, heterozygous prothrombin gene mutation with hx of PE and DVT on coumadin (currently being held due to suspected alveolar hemorrhage) with recent hospitalization in 2020 with a diagnosis of pneumonia presents for worsening SOB, hemoptysis and cough x 1 day. Patient started to feel shortness of breath and had many episodes of hemoptysis with clots, she also had substernal chest pain non radiating, moderate in intensity that worsens on coughing. She denied fever but endorsed chills and lightheadedness.  Denied HA, abd pain, dysuria or paresthesias. She had 2 loose bowel movements day prior to presentation with some blood in the stools that she attributed to her hemorrhoids. She stopped Coumadin some days prior to admission as her INR was supratherapeutic. Patient currently staying at [facility] short term, however, reports  and another relative as flu positive and reports having contact with them both.  Denies receiving flu shot this year.     In ED, patient's temp was 100.1 rectally, tachycardic (sinus) , and saturating to 70s on non rebreather and her SaO2 went up to 95%. ABG showing respiratory alkalosis initially and then corrected after BIPAP placement and correction of RR.  Patient was intubated and admitted to MICU for Acute hypoxic resp failure with hypoxia due to influenza and HCAP w/ Septic shock in immunocompromised patient.     CTA chest showed no PE but showing interval development of multifocal bilateral ground-glass opacities as well as new moderate to severe bronchiectasis in the right lung. Findings concerning for an acute infectious/inflammatory process.     Patient was found to be flu positive, likely viral PNA complicated by findings of diffuse alveolar hemorrhage (anticoagulation currently held) and ARDS.  Hospital course complicated by DVT in L saphenous vein and REJI (on CKD stage 3) likely secondary to ATN due to Vancomycin dosing.      Patient is currently still intubated and has been weaned off sedations x 48hours, however, remains poorly response and unable to follow commands, which is not patient's baseline.  Neurology consult was placed for same.       PAST MEDICAL & SURGICAL HISTORY:  Prothrombin gene mutation  Autoimmune hepatitis  Other chronic pulmonary embolism without acute cor pulmonale  Essential hypertension  Autoimmune hepatitis treated with steroids  Asthma  DVT (deep venous thrombosis)  S/P debridement  History of back surgery      FAMILY HISTORY:  No pertinent family history in first degree relatives      Social History: (-) x 3    Allergies    No Known Allergies    Intolerances        MEDICATIONS  (STANDING):  ALBUTerol    90 MICROgram(s) HFA Inhaler 1 Puff(s) Inhalation every 6 hours  calcitriol  Solution 0.25 MICROGram(s) Oral daily  calcium carbonate    500 mG (Tums) Chewable 2 Tablet(s) Chew every 8 hours  chlorhexidine 0.12% Liquid 15 milliLiter(s) Oral Mucosa every 12 hours  chlorhexidine 4% Liquid 1 Application(s) Topical <User Schedule>  cyanocobalamin 1000 MICROGram(s) Oral daily  gabapentin 300 milliGRAM(s) Oral at bedtime  influenza   Vaccine 0.5 milliLiter(s) IntraMuscular once  insulin regular Infusion 4 Unit(s)/Hr (4 mL/Hr) IV Continuous <Continuous>  ipratropium 17 MICROgram(s) HFA Inhaler 1 Puff(s) Inhalation every 6 hours  labetalol 100 milliGRAM(s) Oral every 12 hours  lactated ringers. 1000 milliLiter(s) (100 mL/Hr) IV Continuous <Continuous>  methylPREDNISolone sodium succinate Injectable 60 milliGRAM(s) IV Push daily  montelukast 10 milliGRAM(s) Oral at bedtime  multivitamin/minerals 1 Tablet(s) Oral daily  mupirocin 2% Ointment 1 Application(s) Topical two times a day  pantoprazole   Suspension 40 milliGRAM(s) Oral daily  propofol Infusion 10 MICROgram(s)/kG/Min (4.89 mL/Hr) IV Continuous <Continuous>  sodium bicarbonate 650 milliGRAM(s) Oral every 8 hours  tacrolimus 0.5 milliGRAM(s) Oral every 12 hours    MEDICATIONS  (PRN):      Review of systems:     Unable to elicit due to patient's inability to participate in interview.     Vital Signs Last 24 Hrs  T(C): 36.4 (2020 08:18), Max: 36.8 (2020 20:00)  T(F): 97.5 (2020 08:18), Max: 98.3 (2020 20:00)  HR: 90 (2020 09:00) (84 - 120)  BP: --  BP(mean): --  RR: 22 (2020 09:00) (22 - 34)  SpO2: 98% (2020 09:00) (93% - 100%)    Neurologic Examination:  General:  Appearance is consistent with chronologic age.  No abnormal facies. Patient is intubated.   General: Patient is able to open eyes spontaneously.  She is unable to answer questions to assess orientation.    Cranial nerves: patient unable to participating in testing for ocular motility; right pupil round and reactive to light, left pupil w/ cataract, unable to assess. unable to maintain eyes open. unable to assess facial sensation, hearing, palate or tongue position. No facial asymmetry. corneal reflex present.   Motor examination:   patient unable to participate in exam.   Formal Muscle Strength Testing:  patient unable to participate in exam.   Sensory examination:   patient does not respond to touch, pinprick or pain stimulus.     Labs:   CBC Full  -  ( 2020 04:50 )  WBC Count : 21.46 K/uL  RBC Count : 2.58 M/uL  Hemoglobin : 7.1 g/dL  Hematocrit : 21.3 %  Platelet Count - Automated : 143 K/uL  Mean Cell Volume : 82.6 fL  Mean Cell Hemoglobin : 27.5 pg  Mean Cell Hemoglobin Concentration : 33.3 g/dL  Auto Neutrophil # : 19.59 K/uL  Auto Lymphocyte # : 0.43 K/uL  Auto Monocyte # : 0.59 K/uL  Auto Eosinophil # : 0.00 K/uL  Auto Basophil # : 0.05 K/uL  Auto Neutrophil % : 91.4 %  Auto Lymphocyte % : 2.0 %  Auto Monocyte % : 2.7 %  Auto Eosinophil % : 0.0 %  Auto Basophil % : 0.2 %        144  |  97<L>  |  184<HH>  ----------------------------<  128<H>  4.2   |  24  |  3.9<H>    Ca    6.9<L>      2020 04:50  Phos  7.9       Mg     2.7         Urinalysis Basic - ( 2020 19:38 )    Color: Yellow / Appearance: Slightly Turbid / S.012 / pH: x  Gluc: x / Ketone: Negative  / Bili: Negative / Urobili: <2 mg/dL   Blood: x / Protein: 30 mg/dL / Nitrite: Negative   Leuk Esterase: Large / RBC: 23 /HPF /  /HPF   Sq Epi: x / Non Sq Epi: 0 /HPF / Bacteria: Negative    Neuroimaging:    NCHCT:     < from: CT Head No Cont (02.10.20 @ 22:29) >  IMPRESSION:     Parenchymal atrophy compatible with the patient's age  No CT evidence of acute fracture, intracranial hemorrhage, midline shift, or mass effect.      < end of copied text >

## 2020-02-12 NOTE — PROGRESS NOTE ADULT - ASSESSMENT
74y female with PMH of asthma, COPD not on home O2, autoimmune hepatitis on prednisone and tacrolimus, heterozygous prothrombin gene mutation with hx of PE and DVT on coumadin presents to the hospital complaining of cough, hemoptysis, SOB and desaturation to 70s. Pt was found to be flu+ likely viral PNA complicated by diffuse alveolar hemorrhage and ARDS . Hospital course complicated by DVT in L saphenous vein with possible DVT and REJI likely secondary to ATN due to vanco.      #ARDS  1-alveolar hemorrhage   2-flu +ve with post viral pneumonia resolved   3-possibel PE: cant start AC due to alveolar hemorrhage    - intubated and sedated  - s/p DDAVP 3 doses   - Solumedrol 40 q24  - Lasix 40   - s/p cefepime and Levaquin (competed 7 days yesterday) - off Vanc >> kidneys toxicity  - completed oseltamivir 30 mg   - C/w Duoneb   - Bronch results - neg culture, neg fungal, cytology positive for inflammatory cells, no organisms  - c/w MIV, monitor for improved mentation to extubate  - Neurology consulted for poor SAT   - EEG   - If no improvement in 48 hours MRI     #Leukocytosis, Afebrile   - Exchanged CVC  - pancx   - fungitell  - procalcitonin   - trend CBC   - if hemodynamic instability or febrile start on Jorge 500g q24     #REJI oliguric likely ATN with Vanc toxicity   - acidosis resolved, UO improved   - Cr improving  - no need RRT for now  - FK level therapeutic   - stopping PO Bicarb  - Nephro following  - PTH elevated     - calcitriol 0.25    # 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  - duplex shows DVT consistent with prior  - might need IVC filter  - not candidate for AC at this time due to alveolar hemorrhage     #Immunosuppressed: Autoimmune Hepatitis   - continue with steroid and tacrolimus  - prednisone 60 mg PO qd at home now on solumedrol   - CMV PCR neg this admission     # HTN  - holding amlodipine 10 and lopressor 50   - Monitor BPs    # heterozygous prothrombin gene mutation with hx of PE and DVT on coumadin  - holding coumadin for now due to alveolar hemorrhage   - monitor coags    #0.5 cm of GB polyp  -needs f/u US in 12 months     # Hx of asthma  - C/w montelukast qd    # GI PPx: Protonix 40 mg PO qd  # DVT PPx: sequentials to right leg only  # Activity: bedrest   # Dispo: ICU  # Code Status: FULL 74y female with PMH of asthma, COPD not on home O2, autoimmune hepatitis on prednisone and tacrolimus, heterozygous prothrombin gene mutation with hx of PE and DVT on coumadin presents to the hospital complaining of cough, hemoptysis, SOB and desaturation to 70s. Pt was found to be flu+ likely viral PNA complicated by diffuse alveolar hemorrhage and ARDS . Hospital course complicated by DVT in L saphenous vein with possible DVT and REJI likely secondary to ATN due to vanco.      #ARDS  1-alveolar hemorrhage   2-flu +ve with post viral pneumonia resolved   3-possibel PE: cant start AC due to alveolar hemorrhage    - intubated and sedated  - s/p DDAVP 3 doses   - Solumedrol 40 q24  - Lasix 40   - s/p cefepime and Levaquin (competed 7 days yesterday) - off Vanc >> kidneys toxicity  - completed oseltamivir 30 mg   - C/w Duoneb   - Bronch results - neg culture, neg fungal, cytology positive for inflammatory cells, no organisms  - c/w MIV, monitor for improved mentation to extubate  - Neurology consulted for poor SAT   - EEG   - If no improvement in 48 hours MRI     #Leukocytosis, Afebrile   - Exchanged CVC  - pancx   - fungitell  - procalcitonin   - trend CBC   - if hemodynamic instability or febrile start on Jorge 500g q24     #REJI oliguric likely ATN with Vanc toxicity   - acidosis resolved, UO improved   - Cr improving  - no need RRT for now  - FK level therapeutic   - stopping PO Bicarb  - Nephro following  - PTH elevated     - calcitriol 0.25  - BUN elevated despite downtrending Cr   - Dehydration vs over feeding vs medication induced   - steroids decreased   - LR 100cc x 12hr   - Nutrition consult for tube feeds    # 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  - duplex shows DVT consistent with prior  - might need IVC filter  - not candidate for AC at this time due to alveolar hemorrhage     #Immunosuppressed: Autoimmune Hepatitis   - continue with steroid and tacrolimus  - prednisone 60 mg PO qd at home now on solumedrol   - CMV PCR neg this admission     # HTN  - holding amlodipine 10 and lopressor 50   - Monitor BPs    # heterozygous prothrombin gene mutation with hx of PE and DVT on coumadin  - holding coumadin for now due to alveolar hemorrhage   - monitor coags    #0.5 cm of GB polyp  -needs f/u US in 12 months     # Hx of asthma  - C/w montelukast qd    # GI PPx: Protonix 40 mg PO qd  # DVT PPx: sequentials to right leg only  # Activity: bedrest   # Dispo: ICU  # Code Status: FULL

## 2020-02-12 NOTE — PROGRESS NOTE ADULT - ASSESSMENT
74y female with PMH of asthma, COPD not on home O2, autoimmune hepatitis on prednisone and tacrolimus, heterozygous prothrombin gene mutation with hx of PE and DVT on coumadin presents to the hospital complaining of cough, hemoptysis, SOB and desaturation to 70s. Pt was found to be flu+ likely viral PNA complicated by diffuse alveolar hemorrhage and ARDS . Hospital course complicated by DVT in L saphenous vein with possible DVT and REJI likely secondary to ATN due to vanco.      #ARDS  1-alveolar hemorrhage   2-flu +ve with post viral pneumonia resolved   3-possibel PE: cant start AC due to alveolar hemorrhage    - intubated and sedated  - s/p DDAVP 3 doses   - Solumedrol 40 q24  - Lasix 40   - s/p cefepime and Levaquin (competed 7 days yesterday) - off Vanc >> kidneys toxicity  - Continue multispecialty follow-up with pulmonary, ID, and renal  - completed oseltamivir 30 mg   - C/w Duoneb   - Bronch results - neg culture, neg fungal, cytology positive for inflammatory cells, no organisms  - c/w MIV, monitor for improved mentation to extubate  - Neurology consulted for poor SAT   - EEG - Being done today  - If no improvement in 48 hours MRI     #Leukocytosis, Afebrile   - Exchanged CVC  - pancx   - fungitell  - procalcitonin   - trend CBC   - if hemodynamic instability or febrile start on Jorge 500g q24     #REJI oliguric likely ATN with Vanc toxicity   - acidosis resolved, UO improved   - Cr improving  - no need RRT for now  - FK level therapeutic   - stopping PO Bicarb  - Nephro following  - PTH elevated     - calcitriol 0.25  - BUN elevated despite downtrending Cr   - Dehydration vs over feeding vs medication induced   - steroids decreased   - LR 100cc x 12hr   - Nutrition consult for tube feeds    # 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  - duplex shows DVT consistent with prior  - might need IVC filter  - not candidate for AC at this time due to alveolar hemorrhage     #Immunosuppressed: Autoimmune Hepatitis   - continue with steroid and tacrolimus  - prednisone 60 mg PO qd at home now on solumedrol   - CMV PCR neg this admission     # HTN  - holding amlodipine 10 and lopressor 50   - Monitor BPs    # heterozygous prothrombin gene mutation with hx of PE and DVT on coumadin  - holding coumadin for now due to alveolar hemorrhage   - monitor coags    #0.5 cm of GB polyp  -needs f/u US in 12 months     # Hx of asthma  - C/w montelukast qd  # Prognosis is poor but aggressive treatment continues  # GI PPx: Protonix 40 mg PO qd  # DVT PPx: sequentials to right leg only  # Activity: bedrest   # Dispo: ICU  # Code Status: FULL

## 2020-02-12 NOTE — PROGRESS NOTE ADULT - ASSESSMENT
73 y/o F with REJI in hospital for SOB, hemoptysis, cough.  PMH asthma, COPD not on home O2, autoimmune hepatitis on prednisone and tacrolimus, heterozygous prothrombin gene mutation with hx of PE and DVT on coumadin, recent hospitalization for pneumonia   # REJI/ ATN in nature  # non oliguric   # creatinine trending down   # on FK continue for now trough noted doubt true trough on low dose/ goal around 5 , repeated level 2.7 ? need to hold tacrolimus  in view of infection , if ok with GI   # corrected calcium around 8.2, on Ca carbonate , PTH noted , on calcitriol 0.25  # continue sodium bicarbonate 650 q 8   # no acute indication for RRT  # will follow  # prognosis guarded 75 y/o F with REJI in hospital for SOB, hemoptysis, cough.  PMH asthma, COPD not on home O2, autoimmune hepatitis on prednisone and tacrolimus, heterozygous prothrombin gene mutation with hx of PE and DVT on coumadin, recent hospitalization for pneumonia   # REJI/ ATN in nature non oliguric creat improving   # on FK continue for now trough noted doubt true trough on low dose/ goal around 5 , repeated level 2.7  / need to repeat trough/ as per previous notes ? need to hold tacrolimus  in view of infection , if ok with GI   # corrected calcium around 8.2, on Ca carbonate , PTH noted , on calcitriol 0.25 mg q24h keep for now   # please hold sodium bicarbonate   # no acute indication for RRT  # will follow  # prognosis guarded

## 2020-02-12 NOTE — PROGRESS NOTE ADULT - ASSESSMENT
IMPRESSION:    Acute hypoxic respiratory failure  DAH; improved  Influenza A treated   ARDS  REJI oliguric; improving   HO Autoimmune hepatitis on tacrolimus and chronic steroids   h/o hypercoagulable state/ vte was on coumadin   Left great saphenous vein thrombosis chronic    PLAN:    CNS:  SAT. EEG. Neuro eval    HEENT: ET care.  Oral care     PULMONARY:  HOB @ 45 degrees. Solumedrol 60 mg qd for now. Wean O2     CARDIOVASCULAR: I=O.  /hr x 12hours.     GI: GI prophylaxis.  Restart Feeding.     RENAL:  Follow up lytes. Replete as needed.  FU with Renal     INFECTIOUS DISEASE: Follow up cultures.  Fungitell.  Repeat cultures    HEMATOLOGICAL:  DVT prophylaxis.      ENDOCRINE:  Follow up FS.  Insulin protocol if needed.    MUSCULOSKELETAL: Bed rest     DC Hogan     Code status: full     Prognosis: Poor prognosis     Continue MICU monitoring for now.

## 2020-02-12 NOTE — PROGRESS NOTE ADULT - SUBJECTIVE AND OBJECTIVE BOX
· Subjective and Objective: 	  Med  Attending Progress Daily Note     2020 12:12  remains intubated on high oxygen requirement  Chart reviewed, patient examined. Pertinent results reviewed.  specialist f/u reviewed  HD#[id number]      Subjective:    Pt was interviewed and examined at the bedside in the AM. No acute events overnight.   Intubated. Unable to assess ROS. Lethargic but opens eyes Very slowly. Does not follow commands.       Past Medical Hx:   Prothrombin gene mutation  Autoimmune hepatitis  Other chronic pulmonary embolism without acute cor pulmonale  Essential hypertension  Autoimmune hepatitis treated with steroids  Asthma  DVT (deep venous thrombosis)    Past Sx:  S/P debridement  History of back surgery  No significant past surgical history    Allergies:  No Known Allergies    Current Meds:   Standng Meds:  ALBUTerol    90 MICROgram(s) HFA Inhaler 1 Puff(s) Inhalation every 6 hours  calcitriol  Solution 0.25 MICROGram(s) Oral daily  calcium carbonate    500 mG (Tums) Chewable 2 Tablet(s) Chew every 8 hours  chlorhexidine 0.12% Liquid 15 milliLiter(s) Oral Mucosa every 12 hours  chlorhexidine 4% Liquid 1 Application(s) Topical <User Schedule>  cyanocobalamin 1000 MICROGram(s) Oral daily  gabapentin 300 milliGRAM(s) Oral at bedtime  influenza   Vaccine 0.5 milliLiter(s) IntraMuscular once  insulin regular Infusion 4 Unit(s)/Hr (4 mL/Hr) IV Continuous <Continuous>  ipratropium 17 MICROgram(s) HFA Inhaler 1 Puff(s) Inhalation every 6 hours  labetalol 100 milliGRAM(s) Oral every 12 hours  lactated ringers. 1000 milliLiter(s) (100 mL/Hr) IV Continuous <Continuous>  methylPREDNISolone sodium succinate Injectable 60 milliGRAM(s) IV Push daily  montelukast 10 milliGRAM(s) Oral at bedtime  multivitamin/minerals 1 Tablet(s) Oral daily  mupirocin 2% Ointment 1 Application(s) Topical two times a day  pantoprazole   Suspension 40 milliGRAM(s) Oral daily  propofol Infusion 10 MICROgram(s)/kG/Min (4.89 mL/Hr) IV Continuous <Continuous>  sodium bicarbonate 650 milliGRAM(s) Oral every 8 hours  tacrolimus 0.5 milliGRAM(s) Oral every 12 hours    PRN Meds:    HOME MEDICATIONS:  acetaminophen 500 mg oral tablet: 2 tab(s) orally every 8 hours  amLODIPine 10 mg oral tablet: 1 tab(s) orally once a day (at bedtime)  budesonide 3 mg oral capsule, extended release: 1 cap(s) orally 2 times a day  ferrous sulfate 325 mg (65 mg elemental iron) oral delayed release tablet: 1 tab(s) orally once a day  furosemide 20 mg oral tablet: 1 tab(s) orally once a day  gabapentin 300 mg oral capsule: 1 cap(s) orally once a day (at bedtime)  ibuprofen 400 mg oral tablet: 1 tab(s) orally every 6 hours, As needed, Moderate Pain (4 - 6)  ipratropium-albuterol 0.5 mg-2.5 mg/3 mLinhalation solution: 3 milliliter(s) inhaled every 6 hours, As Needed  lidocaine 5% topical film: Apply topically to affected area once a day  Metoprolol Tartrate 50 mg oral tablet: 1 tab(s) orally once a day  montelukast 10 mg oral tablet: 1 tab(s) orally once a day (at bedtime)  Multiple Vitamins with Minerals oral tablet: 1 tab(s) orally once a day  oxyCODONE 10 mg oral tablet: 1 tab(s) orally every 6 hours, As needed, Severe Pain (7 - 10)  predniSONE 20 mg oral tablet: 3 tab(s) orally once a day  ProAir HFA 90 mcg/inh inhalation aerosol: 1 puff(s) inhaled every 4 hours, As Needed  risedronate 150 mg oral tablet: 1 tab(s) orally once a month  tacrolimus 0.5 mg oral capsule: 1 cap(s) orally every 12 hours  tiotropium 18 mcg inhalation capsule: 1 cap(s) inhaled once a day, As Needed  Vitamin B12 1000 mcg oral tablet: 1 tab(s) orally once a day  Vitamin C 1000 mg oral tablet: 1 tab(s) orally once a day  warfarin 4 mg oral tablet: 1 tab(s) orally once a day (at bedtime)      Vital Signs:   ICU Vital Signs Last 24 Hrs  T(C): 36.7 (2020 12:00), Max: 36.8 (2020 20:00)  T(F): 98 (2020 12:00), Max: 98.3 (2020 20:00)  HR: 90 (2020 16:34) (84 - 118)  BP: --  BP(mean): --  ABP: 146/64 (2020 15:00) (114/42 - 166/68)  ABP(mean): 90 (2020 15:00) (66 - 102)  RR: 26 (2020 15:00) (22 - 34)  SpO2: 100% (2020 16:34) (93% - 100%)    Physical Exam:   CONSTITUTIONAL: NAD, lethargic  HEAD: Abrasions on face, ET+ OG+   EYES: PERRL, EOMI, no conjunctival erythema  CARD: +S1, S2 Regular rate and rhythm, tachy  RESP: b/l ronchi With fair air movement  ABD: Obese,  soft, nontender, distended, no rebound, no guarding, no rigidity,   EXT: edema on All limbs, wounds on b/l arms - Evulsions and ecchymoses  NEURO: opens eyes, does not follows commands, too weak to move limbs-No movement with pain   Lines: L IJ and F Donna        Labs:                         7.1    21.46 )-----------( 143      ( 2020 04:50 )             21.3     Neutophil% 91.4, Lymphocyte% 2.0, Monocyte% 2.7, Bands% 3.7 20 @ 04:50    2020 04:50    144    |  97     |  184    ----------------------------<  128    4.2     |  24     |  3.9      Ca    6.9        2020 04:50  Phos  7.9       2020 04:50  Mg     2.7       2020 04:50              Serum Pro-Brain Natriuretic Peptide: 7750 pg/mL (20 @ 11:04)          Urinalysis Basic - ( 2020 19:38 )    Color: Yellow / Appearance: Slightly Turbid / S.012 / pH: x  Gluc: x / Ketone: Negative  / Bili: Negative / Urobili: <2 mg/dL   Blood: x / Protein: 30 mg/dL / Nitrite: Negative   Leuk Esterase: Large / RBC: 23 /HPF /  /HPF   Sq Epi: x / Non Sq Epi: 0 /HPF / Bacteria: Negative            Radiology:

## 2020-02-12 NOTE — PROGRESS NOTE ADULT - SUBJECTIVE AND OBJECTIVE BOX
Patient is a 75y old  Female who presents with a chief complaint of SOB and desaturation (2020 09:30)        Over Night Events:    No events overnight. Remains intubated but off sedation.       ROS:     CONSTITUTIONAL:   no fever   no chills.  no weight gain   no weight loss    EYES:   no discharge,   no pain  no redness,   no visual changes.    ENT:   Ears: no ear pain and no hearing problems.  Nose: no nasal congestion and no nasal drainage.  Mouth/Throat: no dysphagia,  no hoarseness and no throat pain.  Neck: no lumps, no pain, no stiffness and no swollen glands.     CARDIOVASCULAR:   no chest pain,   no swelling  no palpitaions  no syncope    RESPIRATORY:  see HPi    GASTROINTESTINAL:   no abdominal pain,   no constipation,   no diarrhea,   no vomiting.    GENITOURINARY:  see HPI    MUSCULOSKELETAL:   no back pain,   no musculoskeletal pain,  no weakness.    SKIN:   no jaundice,   + lesions on Upper extremities   no pruritis,   no rashes.    NEURO:   no loss of consciousness,   no gait abnormality,   no headache,   no sensory deficits,   no weakness.    PSYCHIATRIC:   no known mental health issues  no anxiety  no depression    ALLERGIC/IMMUNOLOGIC:   No active allergic or immunologic issues        PHYSICAL EXAM    ICU Vital Signs Last 24 Hrs  T(C): 36.4 (2020 08:18), Max: 36.8 (2020 20:00)  T(F): 97.5 (2020 08:18), Max: 98.3 (2020 20:00)  HR: 90 (2020 09:00) (84 - 120)  BP: --  BP(mean): --  ABP: 150/70 (2020 09:00) (114/42 - 162/70)  ABP(mean): 98 (2020 09:00) (66 - 106)  RR: 22 (2020 09:00) (22 - 34)  SpO2: 98% (2020 09:00) (93% - 100%) Vent      CONSTITUTIONAL:   Ill appearing.  NAD    ENT:   +ET tube  Airway patent,   Mouth with normal mucosa.   No thrush    CARDIAC:   Normal rate,   Regular rhythm.     No edema      RESPIRATORY:   NO wheezing  Bilateral crackles  Normal chest expansion  Not tachypneic,  No use of accessory muscles    GASTROINTESTINAL:  Abdomen soft,   Non-tender,   No guarding,   + BS    MUSCULOSKELETAL:   Range of motion is not limited,  No clubbing, cyanosis    NEUROLOGICAL:   opens eyes to verbal stimuli  does not follow commands or track    SKIN:   Skin normal color for race,   warm, dry   + B/L UE excoriations        20 @ 07:01  -  20 @ 07:00  --------------------------------------------------------  IN:    Enteral Tube Flush: 140 mL    insulin regular Infusion: 203 mL    Nepro with Carb Steady: 480 mL    Osmolite: 750 mL  Total IN: 1573 mL    OUT:    Indwelling Catheter - Urethral: 1965 mL    Rectal Tube: 100 mL  Total OUT: 2065 mL    Total NET: -492 mL      20 @ 07:01  -  20 @ 09:45  --------------------------------------------------------  IN:    insulin regular Infusion: 3 mL    lactated ringers.: 100 mL  Total IN: 103 mL    OUT:    Indwelling Catheter - Urethral: 45 mL  Total OUT: 45 mL    Total NET: 58 mL          LABS:                            7.1    21.46 )-----------( 143      ( 2020 04:50 )             21.3                                               02-12    144  |  97<L>  |  184<HH>  ----------------------------<  128<H>  4.2   |  24  |  3.9<H>    Ca    6.9<L>      2020 04:50  Phos  7.9       Mg     2.7                                                    Urinalysis Basic - ( 2020 19:38 )    Color: Yellow / Appearance: Slightly Turbid / S.012 / pH: x  Gluc: x / Ketone: Negative  / Bili: Negative / Urobili: <2 mg/dL   Blood: x / Protein: 30 mg/dL / Nitrite: Negative   Leuk Esterase: Large / RBC: 23 /HPF /  /HPF   Sq Epi: x / Non Sq Epi: 0 /HPF / Bacteria: Negative                                                                                                                                        Mode: AC/ CMV (Assist Control/ Continuous Mandatory Ventilation)  RR (machine): 22  TV (machine): 450  FiO2: 40  PEEP: 8  ITime: 1  MAP: 15  PIP: 22                                      ABG - ( 2020 03:24 )  pH, Arterial: 7.42  pH, Blood: x     /  pCO2: 42    /  pO2: 96    / HCO3: 27    / Base Excess: 2.6   /  SaO2: 97                  MEDICATIONS  (STANDING):  ALBUTerol    90 MICROgram(s) HFA Inhaler 1 Puff(s) Inhalation every 6 hours  calcitriol  Solution 0.25 MICROGram(s) Oral daily  calcium carbonate    500 mG (Tums) Chewable 2 Tablet(s) Chew every 8 hours  chlorhexidine 0.12% Liquid 15 milliLiter(s) Oral Mucosa every 12 hours  chlorhexidine 4% Liquid 1 Application(s) Topical <User Schedule>  cyanocobalamin 1000 MICROGram(s) Oral daily  gabapentin 300 milliGRAM(s) Oral at bedtime  influenza   Vaccine 0.5 milliLiter(s) IntraMuscular once  insulin regular Infusion 4 Unit(s)/Hr (4 mL/Hr) IV Continuous <Continuous>  ipratropium 17 MICROgram(s) HFA Inhaler 1 Puff(s) Inhalation every 6 hours  labetalol 100 milliGRAM(s) Oral every 12 hours  lactated ringers. 1000 milliLiter(s) (100 mL/Hr) IV Continuous <Continuous>  methylPREDNISolone sodium succinate Injectable 60 milliGRAM(s) IV Push daily  montelukast 10 milliGRAM(s) Oral at bedtime  multivitamin/minerals 1 Tablet(s) Oral daily  mupirocin 2% Ointment 1 Application(s) Topical two times a day  pantoprazole   Suspension 40 milliGRAM(s) Oral daily  propofol Infusion 10 MICROgram(s)/kG/Min (4.89 mL/Hr) IV Continuous <Continuous>  sodium bicarbonate 650 milliGRAM(s) Oral every 8 hours  tacrolimus 0.5 milliGRAM(s) Oral every 12 hours    MEDICATIONS  (PRN):      New X-rays reviewed:                                                                                  ECHO    CXR interpreted by me:

## 2020-02-13 NOTE — CHART NOTE - NSCHARTNOTEFT_GEN_A_CORE
Registered Dietitian Follow-Up     Patient Profile Reviewed                           Yes [x]   No []     Nutrition History Previously Obtained        Yes []  No [x]       Pertinent Subjective Information: Unable to obtain nutrition hx at this time. Pt remains intubated. No family at bedside during RD visits. No response from emergency contact number.     Pertinent Medical Interventions: Admitted with SOB & desaturation. ARDS noted - alveolar hemorrhage. Flu +ve with post viral pneumonia resolved per progress notes. Intubated & sedated. Candidemia noted. Acute on Chronic Anemia - noted stool clear, OG lavage negative. REJI oliguric - noted no need for RRT. Latest /54 mmHg, mean 84 mmHg (2/13, 13:00).     Diet order: Nepro at 1 can q6hrs ordered 2/11. Regimen at goal provides 1728 kcal, 78 g protein, 701 mL free H2O.     Anthropometrics:  - Ht. 162.6 cm.  - Wt. 79.1 kg (2/13)  - wt change: wt has ranged from 74.3 kg (2/2) to 87.7 kg (2/8). Initial wt this admit was 75.4 kg (1/31). Will continue to assess nutrient needs using lowest wt.  - BMI 28.1 (using lowest wt this admit 74.3 kg)  - IBW 54.5 kg.     Pertinent Lab Data: 2/13: RBC-2.87, H/H-8.2/24.7, POCT gluc-237 (15:09) vs. 227 (14:06) vs. 213 (13:04) vs. 218 (12:02) vs. 72 (11:14) vs. 156 (10:00) vs. 187 (9:19), BUN-190, creat-3.9, gluc-217, K-4.4 (WDL); 2/12: Mg-2.7, PO4-7.9     Pertinent Meds: insulin regular, methylprednisolo, calcitriol, propofol at 4.89 mL/hr (provides 129 kcal/day if infused over 24 hour solution), calcium carbonate, protonix, cyanocobalamin, MVI     Physical Findings:  - Appearance: intubated;   - GI function: Last BM 2/13; multiple loose BMs reported. Abd noted distended per latest MICU progress notes.  - Tubes: OG tube  - Oral/Mouth cavity: NPO  - Skin: L knee abrasion     Nutrition Requirements  Weight Used: 74.8 kg per 2/11 RD assessment     Estimated Energy Needs    Continue [x]  Adjust []  1443 kcal/day ([id number]) - remains appropriate.        Estimated Protein Needs    Continue [x]  Adjust []  67-79g/day (0.9-1.0 g/kg of ABW) - remains appropriate given Renal function        Estimated Fluid Needs        Continue [x]  Adjust []  per LIP     Nutrient Intake: Pt receiving EN regimen at goal rate. Goal rate + propofol rate over 24 hr duration to provide 1857 kcal/day; provides 129% kcal needs & 100% protein needs at goal.        [x] Previous Nutrition Diagnosis: Inadequate energy intake (discontinued)    ***New Nutrition Diagnosis***  Less than optimal enteral nutrition r/t tube feed rate as evidenced by current EN regimen at goal provides 129% kcal needs at goal (when factoring in propofol infusion)    Nutrition Intervention: Enteral Nutrition      Goal/Expected Outcome: Pt to maintain tolerance to diet order, meeting >85% & <105% of estimated nutrient needs over next 4 days.     Indicator/Monitoring: Energy intake, glucose profile, renal profile, nutrition focused physical findings, body composition.    Recommendation: Abd distention noted per latest progress notes - monitor tolerance to EN given this can be a contraindication. If pt continues to tolerate EN, and remains appropriate, change EN formula to Osmolite 1.5 continuous infusions at goal rate 30 mL/hr + order Prosource TF q12hrs. Regimen at goal to provide 1160 kcal, 67 g protein, 1305 mg K+, 910 mg PO4, 547 mL free H2O. Flushes per LIP. Current EN regimen at goal adds 129 kcal/day if infused over 24 hour period. Consider d/cing MVI with minerals. Reviewed with LIP on unit.

## 2020-02-13 NOTE — PROGRESS NOTE ADULT - ASSESSMENT
75 y/o F with REJI in hospital for SOB, hemoptysis, cough.  PMH asthma, COPD not on home O2, autoimmune hepatitis on prednisone and tacrolimus, heterozygous prothrombin gene mutation with hx of PE and DVT on coumadin, recent hospitalization for pneumonia   # REJI/ ATN in nature   # crearinine stable  # non oliguric  #  need to hold tacrolimus  in view of infection , if ok with GI   # corrected calcium around 8.2, on Ca carbonate, increase to 3 tablets q 8  , PTH noted , increase calcitriol 0.25 mg q24, will not increase in view of high ph   # ? etiology of low h/h , receiving blood tx   # no acute indication for RRT  # will follow  # prognosis guarded

## 2020-02-13 NOTE — PROGRESS NOTE ADULT - ASSESSMENT
ASSESSMENT  73y/o F w/ hx of asthma, COPD not on home O2, autoimmune hepatitis on prednisone and tacrolimus, heterozygous prothrombin gene mutation with hx of PE and DVT on coumadin with recent hospitalization in January 2020 with a diagnosis of pneumonia presents for worsening SOB, hemoptysis and cough.    IMPRESSION  #Candidemia Fungitell: >500 (02-11-20 @ 11:21)    2/11 BCX +, RIJ 2/11. Groin a-line 2/4- needs removal     Fungitell: <31 (02-04-20 @ 04:40), Fungitell: 49 (02-01-20 @ 11:17)  #Flu + AH1, Alveolar hemorrhage, ?superimposed atypical PNA (imaging not really consistent with bacterial PNA). Recent bronch 1/20 negative for PCP, Fungal, etc, previous bronch cx with yeast (likely oral contaminant) since GMS neg    2/3 Bronch   No organisms seen, 2/3 cytopath Rare atypical cells, few ciliated bronchial cells, alveolar macrophages and inflammatory cells, mainly neutrophils.    Strep urine Ag neg, serum crypto Ag neg, CMV PCR undetectable, AFB neg    Quantiferon indeterminate    Lactate Dehydrogenase, Serum: 607 (02.03.20 @ 04:30)    Procalcitonin, Serum: 1.86:    CTA chest showing no PE but showing interval development of multifocal bilateral groundglass opacities as well as new moderate to severe bronchiectasis in the right lung,   1/13 CT b/l GGOs, compatible with intersitial edema      Serum Pro-Brain Natriuretic Peptide: 722 pg/mL (01.31.20 @ 09:25)<-- Serum Pro-Brain Natriuretic Peptide: 345 pg/mL (01.13.20 @ 12:10)    During last hospitalization: bronch cx bacterial NG, +yeast, pending ID, neg legionella, + MRSA PCR    MRSA PCR Result.: Positive (02-01-20 @ 20:40)  #Severe sepsis on admission P>90, WBC 19, RR>20, lactic acidosis Lactate, Blood: 2.9 mmol/L (01-31-20 @ 09:25)    afebrile   #Elevated Alk ph, transaminitis  #LLE wound, not infection     12/7 WCX MSSA, Kleb, bacteroides    8/2019 MRSA in a wound   #Immunosuppressed: autoimmune hepatitis on prednisone and tacrolimus    RECOMMENDATIONS  - Remove R groin a-line  - As C. albicans, change caspo to Fluconazole crcl 16 loading dose 400mg x1 then 200mg daily IV    QTC Calculation(Bezet) 456 ms  - Repeat Blood cx until clearance  - Ophtho exam r/o endophthalmitis   - Trend WBC  - completed oseltamivir & abx s/p cefepime/levaquin  - on methylPREDNISolone sodium succinate Injectable 60    Spectra [id number]

## 2020-02-13 NOTE — PROGRESS NOTE ADULT - SUBJECTIVE AND OBJECTIVE BOX
Hospital Day:  13d    Subjective:    Pt was interviewed and examined at the bedside in the AM. No acute events overnight.   Intubated and sedated. Unable to assess ROS. Lethargic but opens eyes. Does not follow commands.       Past Medical Hx:   Prothrombin gene mutation  Autoimmune hepatitis  Other chronic pulmonary embolism without acute cor pulmonale  Essential hypertension  Autoimmune hepatitis treated with steroids  Asthma  DVT (deep venous thrombosis)    Past Sx:  S/P debridement  History of back surgery  No significant past surgical history    Allergies:  No Known Allergies    Current Meds:   Standng Meds:  ALBUTerol    90 MICROgram(s) HFA Inhaler 1 Puff(s) Inhalation every 6 hours  calcitriol  Solution 0.25 MICROGram(s) Oral daily  calcium carbonate    500 mG (Tums) Chewable 2 Tablet(s) Chew every 8 hours  caspofungin IVPB      caspofungin IVPB 50 milliGRAM(s) IV Intermittent every 24 hours  chlorhexidine 0.12% Liquid 15 milliLiter(s) Oral Mucosa every 12 hours  chlorhexidine 4% Liquid 1 Application(s) Topical <User Schedule>  cyanocobalamin 1000 MICROGram(s) Oral daily  gabapentin 300 milliGRAM(s) Oral at bedtime  influenza   Vaccine 0.5 milliLiter(s) IntraMuscular once  insulin regular Infusion 4 Unit(s)/Hr (4 mL/Hr) IV Continuous <Continuous>  ipratropium 17 MICROgram(s) HFA Inhaler 1 Puff(s) Inhalation every 6 hours  labetalol 100 milliGRAM(s) Oral every 12 hours  methylPREDNISolone sodium succinate Injectable 60 milliGRAM(s) IV Push daily  montelukast 10 milliGRAM(s) Oral at bedtime  multivitamin/minerals 1 Tablet(s) Oral daily  mupirocin 2% Ointment 1 Application(s) Topical two times a day  pantoprazole   Suspension 40 milliGRAM(s) Oral daily  propofol Infusion 10 MICROgram(s)/kG/Min (4.89 mL/Hr) IV Continuous <Continuous>  tacrolimus 0.5 milliGRAM(s) Oral every 12 hours    PRN Meds:  acetaminophen   Tablet .. 650 milliGRAM(s) Oral every 6 hours PRN Temp greater or equal to 38C (100.4F)    HOME MEDICATIONS:  acetaminophen 500 mg oral tablet: 2 tab(s) orally every 8 hours  amLODIPine 10 mg oral tablet: 1 tab(s) orally once a day (at bedtime)  budesonide 3 mg oral capsule, extended release: 1 cap(s) orally 2 times a day  ferrous sulfate 325 mg (65 mg elemental iron) oral delayed release tablet: 1 tab(s) orally once a day  furosemide 20 mg oral tablet: 1 tab(s) orally once a day  gabapentin 300 mg oral capsule: 1 cap(s) orally once a day (at bedtime)  ibuprofen 400 mg oral tablet: 1 tab(s) orally every 6 hours, As needed, Moderate Pain (4 - 6)  ipratropium-albuterol 0.5 mg-2.5 mg/3 mLinhalation solution: 3 milliliter(s) inhaled every 6 hours, As Needed  lidocaine 5% topical film: Apply topically to affected area once a day  Metoprolol Tartrate 50 mg oral tablet: 1 tab(s) orally once a day  montelukast 10 mg oral tablet: 1 tab(s) orally once a day (at bedtime)  Multiple Vitamins with Minerals oral tablet: 1 tab(s) orally once a day  oxyCODONE 10 mg oral tablet: 1 tab(s) orally every 6 hours, As needed, Severe Pain (7 - 10)  predniSONE 20 mg oral tablet: 3 tab(s) orally once a day  ProAir HFA 90 mcg/inh inhalation aerosol: 1 puff(s) inhaled every 4 hours, As Needed  risedronate 150 mg oral tablet: 1 tab(s) orally once a month  tacrolimus 0.5 mg oral capsule: 1 cap(s) orally every 12 hours  tiotropium 18 mcg inhalation capsule: 1 cap(s) inhaled once a day, As Needed  Vitamin B12 1000 mcg oral tablet: 1 tab(s) orally once a day  Vitamin C 1000 mg oral tablet: 1 tab(s) orally once a day  warfarin 4 mg oral tablet: 1 tab(s) orally once a day (at bedtime)      Vital Signs:   T(F): 98.2 (20 @ 08:00), Max: 100.4 (20 @ 20:00)  HR: 88 (20 @ 10:00) (88 - 119)  BP: --  RR: 24 (20 @ 10:00) (23 - 32)  SpO2: 99% (20 @ 10:00) (96% - 100%)      20 @ 07:01  -  20 @ 07:00  --------------------------------------------------------  IN: 3075 mL / OUT: 1605 mL / NET: 1470 mL    20 @ 07:01  -  20 @ 11:34  --------------------------------------------------------  IN: 14 mL / OUT: 100 mL / NET: -86 mL        Physical Exam:   CONSTITUTIONAL: NAD, letharfiv   HEAD: Normocephalic; atraumatic, ET+ NG+   EYES: PERRL, EOMI, no conjunctival erythema  CARD: +S1, S2 Regular rate and rhythm, tachy  RESP: b/l ronchi  ABD: soft nontender, distended, no rebound, no guarding, no rigidity,   EXT: edema on limbs, wounds on b/l arms   NEURO: opens eyes, does not follows commands, to weak to move limbs   Lines: ERA MAURO and F Donna      Labs:                         5.9    18.98 )-----------( 133      ( 2020 04:50 )             18.3     Neutophil% 89.5, Lymphocyte% 5.3, Monocyte% 2.5, Bands% 2.6 20 @ 04:50    2020 04:50    145    |  101    |  190    ----------------------------<  217    4.4     |  24     |  3.9      Ca    6.9        2020 04:50  Phos  7.9       2020 04:50  Mg     2.7       2020 04:50              Serum Pro-Brain Natriuretic Peptide: 7750 pg/mL (20 @ 11:04)          Urinalysis Basic - ( 2020 19:38 )    Color: Yellow / Appearance: Slightly Turbid / S.012 / pH: x  Gluc: x / Ketone: Negative  / Bili: Negative / Urobili: <2 mg/dL   Blood: x / Protein: 30 mg/dL / Nitrite: Negative   Leuk Esterase: Large / RBC: 23 /HPF /  /HPF   Sq Epi: x / Non Sq Epi: 0 /HPF / Bacteria: Negative          Culture - Blood (collected 20 @ 11:21)  Source: .Blood None  Gram Stain (20 @ 16:48):    Growth in aerobic bottle: Yeast like cells  Preliminary Report (20 @ 16:48):    Growth in aerobic bottle: Yeast like cells    ***Blood Panel PCR results on this specimen are available    approximately 3 hours after the Gram stain result.***    Gram stain, PCR, and/or culture results may not always    correspond due to difference in methodologies.    ************************************************************    This PCR assay was performed using [employer].    The following targets are tested for: Enterococcus,    vancomycin resistant enterococci, Listeria monocytogenes,    coagulase negative staphylococci, S. aureus,    methicillin resistant S. aureus, Streptococcus agalactiae    (Group B), S. pneumoniae, S. pyogenes (Group A),    Acinetobacter baumannii, Enterobacter cloacae, E. coli,    Klebsiella oxytoca, K. pneumoniae, Proteus sp.,    Serratia marcescens, Haemophilus influenzae,    Neisseria meningitidis, Pseudomonas aeruginosa, Candida    albicans, C. glabrata, C krusei, C parapsilosis,    C. tropicalis and the KPC resistance gene.  Organism: Blood Culture PCR (20 @ 18:21)  Organism: Blood Culture PCR (20 @ 18:21)      -  Candida albicans: Detec      Method Type: PCR Hospital Day:  13d    Subjective:    Pt was interviewed and examined at the bedside in the AM. No acute events overnight.   Intubated and sedated. Unable to assess ROS. Lethargic but opens eyes. Does not follow commands.       Past Medical Hx:   Prothrombin gene mutation  Autoimmune hepatitis  Other chronic pulmonary embolism without acute cor pulmonale  Essential hypertension  Autoimmune hepatitis treated with steroids  Asthma  DVT (deep venous thrombosis)    Past Sx:  S/P debridement  History of back surgery  No significant past surgical history    Allergies:  No Known Allergies    Current Meds:   Standng Meds:  ALBUTerol    90 MICROgram(s) HFA Inhaler 1 Puff(s) Inhalation every 6 hours  calcitriol  Solution 0.25 MICROGram(s) Oral daily  calcium carbonate    500 mG (Tums) Chewable 2 Tablet(s) Chew every 8 hours  caspofungin IVPB      caspofungin IVPB 50 milliGRAM(s) IV Intermittent every 24 hours  chlorhexidine 0.12% Liquid 15 milliLiter(s) Oral Mucosa every 12 hours  chlorhexidine 4% Liquid 1 Application(s) Topical <User Schedule>  cyanocobalamin 1000 MICROGram(s) Oral daily  gabapentin 300 milliGRAM(s) Oral at bedtime  influenza   Vaccine 0.5 milliLiter(s) IntraMuscular once  insulin regular Infusion 4 Unit(s)/Hr (4 mL/Hr) IV Continuous <Continuous>  ipratropium 17 MICROgram(s) HFA Inhaler 1 Puff(s) Inhalation every 6 hours  labetalol 100 milliGRAM(s) Oral every 12 hours  methylPREDNISolone sodium succinate Injectable 60 milliGRAM(s) IV Push daily  montelukast 10 milliGRAM(s) Oral at bedtime  multivitamin/minerals 1 Tablet(s) Oral daily  mupirocin 2% Ointment 1 Application(s) Topical two times a day  pantoprazole   Suspension 40 milliGRAM(s) Oral daily  propofol Infusion 10 MICROgram(s)/kG/Min (4.89 mL/Hr) IV Continuous <Continuous>  tacrolimus 0.5 milliGRAM(s) Oral every 12 hours    PRN Meds:  acetaminophen   Tablet .. 650 milliGRAM(s) Oral every 6 hours PRN Temp greater or equal to 38C (100.4F)    HOME MEDICATIONS:  acetaminophen 500 mg oral tablet: 2 tab(s) orally every 8 hours  amLODIPine 10 mg oral tablet: 1 tab(s) orally once a day (at bedtime)  budesonide 3 mg oral capsule, extended release: 1 cap(s) orally 2 times a day  ferrous sulfate 325 mg (65 mg elemental iron) oral delayed release tablet: 1 tab(s) orally once a day  furosemide 20 mg oral tablet: 1 tab(s) orally once a day  gabapentin 300 mg oral capsule: 1 cap(s) orally once a day (at bedtime)  ibuprofen 400 mg oral tablet: 1 tab(s) orally every 6 hours, As needed, Moderate Pain (4 - 6)  ipratropium-albuterol 0.5 mg-2.5 mg/3 mLinhalation solution: 3 milliliter(s) inhaled every 6 hours, As Needed  lidocaine 5% topical film: Apply topically to affected area once a day  Metoprolol Tartrate 50 mg oral tablet: 1 tab(s) orally once a day  montelukast 10 mg oral tablet: 1 tab(s) orally once a day (at bedtime)  Multiple Vitamins with Minerals oral tablet: 1 tab(s) orally once a day  oxyCODONE 10 mg oral tablet: 1 tab(s) orally every 6 hours, As needed, Severe Pain (7 - 10)  predniSONE 20 mg oral tablet: 3 tab(s) orally once a day  ProAir HFA 90 mcg/inh inhalation aerosol: 1 puff(s) inhaled every 4 hours, As Needed  risedronate 150 mg oral tablet: 1 tab(s) orally once a month  tacrolimus 0.5 mg oral capsule: 1 cap(s) orally every 12 hours  tiotropium 18 mcg inhalation capsule: 1 cap(s) inhaled once a day, As Needed  Vitamin B12 1000 mcg oral tablet: 1 tab(s) orally once a day  Vitamin C 1000 mg oral tablet: 1 tab(s) orally once a day  warfarin 4 mg oral tablet: 1 tab(s) orally once a day (at bedtime)      Vital Signs:   T(F): 98.2 (20 @ 08:00), Max: 100.4 (20 @ 20:00)  HR: 88 (20 @ 10:00) (88 - 119)  BP: --  RR: 24 (20 @ 10:00) (23 - 32)  SpO2: 99% (20 @ 10:00) (96% - 100%)      20 @ 07:01  -  20 @ 07:00  --------------------------------------------------------  IN: 3075 mL / OUT: 1605 mL / NET: 1470 mL    20 @ 07:01  -  20 @ 11:34  --------------------------------------------------------  IN: 14 mL / OUT: 100 mL / NET: -86 mL        Physical Exam:   CONSTITUTIONAL: NAD, lethargic   HEAD: Normocephalic; atraumatic, ET+ OG+   EYES: PERRL, EOMI, no conjunctival erythema  CARD: +S1, S2 Regular rate and rhythm, tachy  RESP: b/l ronchi  ABD: soft nontender, distended, no rebound, no guarding, no rigidity,   EXT: edema on limbs, wounds on b/l arms   NEURO: opens eyes, does not follows commands, to weak to move limbs   Lines: ERA MAURO and F Donna      Labs:                         5.9    18.98 )-----------( 133      ( 2020 04:50 )             18.3     Neutophil% 89.5, Lymphocyte% 5.3, Monocyte% 2.5, Bands% 2.6 20 @ 04:50    2020 04:50    145    |  101    |  190    ----------------------------<  217    4.4     |  24     |  3.9      Ca    6.9        2020 04:50  Phos  7.9       2020 04:50  Mg     2.7       2020 04:50              Serum Pro-Brain Natriuretic Peptide: 7750 pg/mL (20 @ 11:04)          Urinalysis Basic - ( 2020 19:38 )    Color: Yellow / Appearance: Slightly Turbid / S.012 / pH: x  Gluc: x / Ketone: Negative  / Bili: Negative / Urobili: <2 mg/dL   Blood: x / Protein: 30 mg/dL / Nitrite: Negative   Leuk Esterase: Large / RBC: 23 /HPF /  /HPF   Sq Epi: x / Non Sq Epi: 0 /HPF / Bacteria: Negative          Culture - Blood (collected 20 @ 11:21)  Source: .Blood None  Gram Stain (20 @ 16:48):    Growth in aerobic bottle: Yeast like cells  Preliminary Report (20 @ 16:48):    Growth in aerobic bottle: Yeast like cells    ***Blood Panel PCR results on this specimen are available    approximately 3 hours after the Gram stain result.***    Gram stain, PCR, and/or culture results may not always    correspond due to difference in methodologies.    ************************************************************    This PCR assay was performed using [employer].    The following targets are tested for: Enterococcus,    vancomycin resistant enterococci, Listeria monocytogenes,    coagulase negative staphylococci, S. aureus,    methicillin resistant S. aureus, Streptococcus agalactiae    (Group B), S. pneumoniae, S. pyogenes (Group A),    Acinetobacter baumannii, Enterobacter cloacae, E. coli,    Klebsiella oxytoca, K. pneumoniae, Proteus sp.,    Serratia marcescens, Haemophilus influenzae,    Neisseria meningitidis, Pseudomonas aeruginosa, Candida    albicans, C. glabrata, C krusei, C parapsilosis,    C. tropicalis and the KPC resistance gene.  Organism: Blood Culture PCR (20 @ 18:21)  Organism: Blood Culture PCR (20 @ 18:21)      -  Candida albicans: Detec      Method Type: PCR

## 2020-02-13 NOTE — PROGRESS NOTE ADULT - ASSESSMENT
IMPRESSION:    Acute hypoxic respiratory failure  DAH; improved  Influenza A treated   ARDS  REJI oliguric; improving   HO Autoimmune hepatitis on tacrolimus and chronic steroids   h/o hypercoagulable state/ vte was on coumadin   Left great saphenous vein thrombosis chronic    PLAN:    CNS:  Keep off sedation.  FU MS.      HEENT: ET care.  Oral care     PULMONARY:  HOB @ 45 degrees. Solumedrol 60 mg qd for now. Wean O2     CARDIOVASCULAR:  I=O.      GI: GI prophylaxis.  Feeding.     RENAL:  Follow up lytes. Replete as needed.  FU with Renal     INFECTIOUS DISEASE: Follow up cultures.  Continue Anti fungal     HEMATOLOGICAL:  DVT prophylaxis.      ENDOCRINE:  Follow up FS.  Insulin protocol if needed.    MUSCULOSKELETAL: Bed rest     Hogan for retention     Code status: full     Prognosis: Poor prognosis     Continue MICU monitoring for now.

## 2020-02-13 NOTE — PROGRESS NOTE ADULT - SUBJECTIVE AND OBJECTIVE BOX
DARNELL, DONI  75y, Female  Allergy: No Known Allergies      LOS  13d    CHIEF COMPLAINT: SOB and desaturation (2020 08:54)      INTERVAL EVENTS/HPI  -  BCX c. albicans  - T(F): , Max: 100.4 (20 @ 20:00)  - Denies any worsening symptoms  - Tolerating medication  - WBC Count: 18.98 (20 @ 04:50)  WBC Count: 21.46 (20 @ 04:50)  - Creatinine, Serum: 3.9 (20 @ 04:50)  Creatinine, Serum: 3.9 (20 @ 04:50)       ROS  unable to obtain history secondary to patient's mental status and/or sedation     VITALS:  T(F): 98.2, Max: 100.4 (20 @ 20:00)  HR: 88  BP: --  RR: 24Vital Signs Last 24 Hrs  T(C): 36.8 (2020 08:00), Max: 38 (2020 20:00)  T(F): 98.2 (2020 08:00), Max: 100.4 (2020 20:00)  HR: 88 (2020 10:00) (88 - 119)  BP: --  BP(mean): --  RR: 24 (2020 10:00) (23 - 32)  SpO2: 99% (2020 10:00) (96% - 100%)    PHYSICAL EXAM:  Gen: intubated  HEENT: Normocephalic, atraumatic  Neck: supple, no lymphadenopathy  CV: Regular rate & regular rhythm  Lungs: decreased BS at bases, no fremitus  Abdomen: Soft, BS present  Ext: Warm, well perfused, anasarca  Neuro: off sedation, opens eyes not following commands  Skin: edema/erythema, ecchymoses UE LE, open wound L knee, L wrist  Lines: R groin a-line, bleeding around site, L IJ -      FH: Non-contributory  Social Hx: Non-contributory    TESTS & MEASUREMENTS:                        5.9    18.98 )-----------( 133      ( 2020 04:50 )             18.3     02-13    145  |  101  |  190<HH>  ----------------------------<  217<H>  4.4   |  24  |  3.9<H>    Ca    6.9<L>      2020 04:50  Phos  7.9       Mg     2.7           eGFR if Non African American: 11 mL/min/1.73M2 (20 @ 04:50)  eGFR if African American: 12 mL/min/1.73M2 (20 @ 04:50)      Urinalysis Basic - ( 2020 19:38 )    Color: Yellow / Appearance: Slightly Turbid / S.012 / pH: x  Gluc: x / Ketone: Negative  / Bili: Negative / Urobili: <2 mg/dL   Blood: x / Protein: 30 mg/dL / Nitrite: Negative   Leuk Esterase: Large / RBC: 23 /HPF /  /HPF   Sq Epi: x / Non Sq Epi: 0 /HPF / Bacteria: Negative        Culture - Blood (collected 20 @ 11:21)  Source: .Blood None  Gram Stain (20 @ 16:48):    Growth in aerobic bottle: Yeast like cells  Preliminary Report (20 @ 16:48):    Growth in aerobic bottle: Yeast like cells    ***Blood Panel PCR results on this specimen are available    approximately 3 hours after the Gram stain result.***    Gram stain, PCR, and/or culture results may not always    correspond due to difference in methodologies.    ************************************************************    This PCR assay was performed using [employer].    The following targets are tested for: Enterococcus,    vancomycin resistant enterococci, Listeria monocytogenes,    coagulase negative staphylococci, S. aureus,    methicillin resistant S. aureus, Streptococcus agalactiae    (Group B), S. pneumoniae, S. pyogenes (Group A),    Acinetobacter baumannii, Enterobacter cloacae, E. coli,    Klebsiella oxytoca, K. pneumoniae, Proteus sp.,    Serratia marcescens, Haemophilus influenzae,    Neisseria meningitidis, Pseudomonas aeruginosa, Candida    albicans, C. glabrata, C krusei, C parapsilosis,    C. tropicalis and the KPC resistance gene.  Organism: Blood Culture PCR (20 @ 18:21)  Organism: Blood Culture PCR (20 @ 18:21)      -  Candida albicans: Detec      Method Type: PCR        Lactate, Blood: 1.1 mmol/L (20 @ 04:30)  Lactate, Blood: 2.0 mmol/L (20 @ 23:29)      INFECTIOUS DISEASES TESTING  Fungitell: >500 (20 @ 11:21)  Fungitell: <31 (20 @ 04:40)  Streptococcus Pneumoniae Ag Urine: Negative (20 @ 11:00)  MRSA PCR Result.: Positive (20 @ 20:40)  Fungitell: 49 (20 @ 11:17)  Rapid RVP Result: Detected (20 @ 16:24)  Legionella Antigen, Urine: Negative (20 @ 15:36)  Streptococcus Pneumoniae Ag Urine: Negative (20 @ 15:36)  Legionella Antigen, Urine: Negative (20 @ 02:50)  MRSA PCR Result.: Positive (20 @ 13:59)  MRSA PCR Result.: Negative (19 @ 08:46)      RADIOLOGY & ADDITIONAL TESTS:  I have personally reviewed the last available Chest xray  CXR      CT      CARDIOLOGY TESTING  12 Lead ECG:   Ventricular Rate 118 BPM    Atrial Rate 118 BPM    P-R Interval 150 ms    QRS Duration 80 ms    Q-T Interval 326 ms    QTC Calculation(Bezet) 456 ms    P Axis 41 degrees    R Axis -10 degrees    T Axis 11 degrees    Diagnosis Line Sinus tachycardia  Possible Left atrial enlargement  Inferior infarct , age undetermined  Abnormal ECG    Confirmed by MOHSEN SONG MD ([id number]) on 2020 12:38:55 PM (20 @ 11:06)      MEDICATIONS  ALBUTerol    90 MICROgram(s) HFA Inhaler 1  calcitriol  Solution 0.25  calcium carbonate    500 mG (Tums) Chewable 2  caspofungin IVPB   caspofungin IVPB 50  chlorhexidine 0.12% Liquid 15  chlorhexidine 4% Liquid 1  cyanocobalamin 1000  gabapentin 300  influenza   Vaccine 0.5  insulin regular Infusion 4  ipratropium 17 MICROgram(s) HFA Inhaler 1  labetalol 100  methylPREDNISolone sodium succinate Injectable 60  montelukast 10  multivitamin/minerals 1  mupirocin 2% Ointment 1  pantoprazole   Suspension 40  propofol Infusion 10  tacrolimus 0.5      WEIGHT  Weight (kg): 81.5 (20 @ 12:15)    ANTIBIOTICS:  caspofungin IVPB      caspofungin IVPB 50 milliGRAM(s) IV Intermittent every 24 hours      All available historical records have been reviewed

## 2020-02-13 NOTE — PROGRESS NOTE ADULT - SUBJECTIVE AND OBJECTIVE BOX
Patient is a 75y old  Female who presents with a chief complaint of SOB and desaturation (2020 08:18)        Over Night Events:  Remains critically ill on MV>  Off pressors.  Off sedation.  OG lavage no bleeding         ROS:     CONSTITUTIONAL:   fever   no chills.  no weight gain   no weight loss    EYES:   no discharge,   no pain  no redness,   no visual changes.    ENT:   Ears: no ear pain and no hearing problems.  Nose: no nasal congestion and no nasal drainage.  Mouth/Throat: no dysphagia,  no hoarseness and no throat pain.  Neck: no lumps, no pain, no stiffness and no swollen glands.     CARDIOVASCULAR:   no chest pain,   no swelling  no palpitaions  no syncope    RESPIRATORY:  Per HPI    GASTROINTESTINAL:   no abdominal pain,   no constipation,   no diarrhea,   no vomiting.    GENITOURINARY:  no dysuria,   no frequency,   no urgency  no hematuria.    MUSCULOSKELETAL:   no back pain,   no musculoskeletal pain,  no weakness.    SKIN:   no jaundice,   no lesions,   no pruritis,   no rashes.    NEURO:   Per HPI     PSYCHIATRIC:   no known mental health issues  no anxiety  no depression    ALLERGIC/IMMUNOLOGIC:   No active allergic or immunologic issues        PHYSICAL EXAM    ICU Vital Signs Last 24 Hrs  T(C): 37.2 (2020 00:00), Max: 38 (2020 20:00)  T(F): 98.9 (2020 00:00), Max: 100.4 (2020 20:00)  HR: 92 (2020 08:53) (90 - 119)  BP: --  BP(mean): --  ABP: 140/50 (2020 07:00) (106/46 - 166/68)  ABP(mean): 78 (2020 07:00) (62 - 102)  RR: 28 (2020 07:00) (22 - 32)  SpO2: 97% (2020 08:53) (96% - 100%)      CONSTITUTIONAL:   Ill appearing.  Well nourished.  NAD    ENT:   Airway patent,   Mouth with normal mucosa.   No thrush    EYES:   Pupils equal,   Round and reactive to light.    CARDIAC:   Normal rate,   Regular   edema      Vascular:  Normal systolic impulse  No Carotid bruits    RESPIRATORY:   No wheezing  Bilateral BS  Normal chest expansion  Not tachypneic,  No use of accessory muscles    GASTROINTESTINAL:  Abdomen soft,   Non-tender,   No guarding,   + BS    GENITOURINARY  normal genitalia for sex  edema    MUSCULOSKELETAL:   Range of motion is not limited,  No muscle or joint tenderness  No clubbing, cyanosis    NEUROLOGICAL:   Opens eyes  Does not follow commands     SKIN:   Skin normal color for race,   Warm and dry and intact.   No evidence of rash.    PSYCHIATRIC:   Normal mood and affect.   No apparent risk to self or others.    HEME LYMPH:   No cervical  lymphadenopathy.  no inguinal lymphadenopathy      20 @ 07:01  -  20 @ 07:00  --------------------------------------------------------  IN:    Enteral Tube Flush: 120 mL    insulin regular Infusion: 101 mL    IV PiggyBack: 250 mL    lactated ringers.: 1200 mL    lactated ringers.: 100 mL    Nepro with Carb Steady: 1000 mL    Packed Red Blood Cells: 304 mL  Total IN: 3075 mL    OUT:    Indwelling Catheter - Urethral: 1555 mL    Rectal Tube: 50 mL  Total OUT: 1605 mL    Total NET: 1470 mL          LABS:                            5.9    18.98 )-----------( 133      ( 2020 04:50 )             18.3                                               02-13    145  |  101  |  190<HH>  ----------------------------<  217<H>  4.4   |  24  |  3.9<H>    Ca    6.9<L>      2020 04:50  Phos  7.9     02-12  Mg     2.7     02-12                                               Urinalysis Basic - ( 2020 19:38 )    Color: Yellow / Appearance: Slightly Turbid / S.012 / pH: x  Gluc: x / Ketone: Negative  / Bili: Negative / Urobili: <2 mg/dL   Blood: x / Protein: 30 mg/dL / Nitrite: Negative   Leuk Esterase: Large / RBC: 23 /HPF /  /HPF   Sq Epi: x / Non Sq Epi: 0 /HPF / Bacteria: Negative                                                                                           Culture - Blood (collected 2020 11:21)  Source: .Blood None  Gram Stain (2020 16:48):    Growth in aerobic bottle: Yeast like cells  Preliminary Report (2020 16:48):    Growth in aerobic bottle: Yeast like cells    ***Blood Panel PCR results on this specimen are available    approximately 3 hours after the Gram stain result.***    Gram stain, PCR, and/or culture results may not always    correspond due to difference in methodologies.    ************************************************************    This PCR assay was performed using [email or web address].    The following targets are tested for: Enterococcus,    vancomycin resistant enterococci, Listeria monocytogenes,    coagulase negative staphylococci, S. aureus,    methicillin resistant S. aureus, Streptococcus agalactiae    (Group B), S. pneumoniae, S. pyogenes (Group A),    Acinetobacter baumannii, Enterobacter cloacae, E. coli,    Klebsiella oxytoca, K. pneumoniae, Proteus sp.,    Serratia marcescens, Haemophilus influenzae,    Neisseria meningitidis, Pseudomonas aeruginosa, Candida    albicans, C. glabrata, C krusei, C parapsilosis,    C. tropicalis and the KPC resistance gene.  Organism: Blood Culture PCR (2020 18:21)  Organism: Blood Culture PCR (2020 18:21)                                                   Mode: AC/ CMV (Assist Control/ Continuous Mandatory Ventilation)  RR (machine): 22  TV (machine): 450  FiO2: 40  PEEP: 8  ITime: 1  MAP: 17  PIP: 29                                      ABG - ( 2020 04:51 )  pH, Arterial: 7.48  pH, Blood: x     /  pCO2: 37    /  pO2: 81    / HCO3: 28    / Base Excess: 4.0   /  SaO2: 97                  MEDICATIONS  (STANDING):  ALBUTerol    90 MICROgram(s) HFA Inhaler 1 Puff(s) Inhalation every 6 hours  calcitriol  Solution 0.25 MICROGram(s) Oral daily  calcium carbonate    500 mG (Tums) Chewable 2 Tablet(s) Chew every 8 hours  caspofungin IVPB      caspofungin IVPB 50 milliGRAM(s) IV Intermittent every 24 hours  chlorhexidine 0.12% Liquid 15 milliLiter(s) Oral Mucosa every 12 hours  chlorhexidine 4% Liquid 1 Application(s) Topical <User Schedule>  cyanocobalamin 1000 MICROGram(s) Oral daily  gabapentin 300 milliGRAM(s) Oral at bedtime  influenza   Vaccine 0.5 milliLiter(s) IntraMuscular once  insulin regular Infusion 4 Unit(s)/Hr (4 mL/Hr) IV Continuous <Continuous>  ipratropium 17 MICROgram(s) HFA Inhaler 1 Puff(s) Inhalation every 6 hours  labetalol 100 milliGRAM(s) Oral every 12 hours  lactated ringers. 1000 milliLiter(s) (100 mL/Hr) IV Continuous <Continuous>  methylPREDNISolone sodium succinate Injectable 60 milliGRAM(s) IV Push daily  montelukast 10 milliGRAM(s) Oral at bedtime  multivitamin/minerals 1 Tablet(s) Oral daily  mupirocin 2% Ointment 1 Application(s) Topical two times a day  pantoprazole   Suspension 40 milliGRAM(s) Oral daily  propofol Infusion 10 MICROgram(s)/kG/Min (4.89 mL/Hr) IV Continuous <Continuous>  tacrolimus 0.5 milliGRAM(s) Oral every 12 hours    MEDICATIONS  (PRN):  acetaminophen   Tablet .. 650 milliGRAM(s) Oral every 6 hours PRN Temp greater or equal to 38C (100.4F)      New X-rays reviewed:                                                                                  ECHO    CXR interpreted by me:  ET OG OK>  Bilateral infiltrates

## 2020-02-13 NOTE — PROGRESS NOTE ADULT - ASSESSMENT
74y female with PMH of asthma, COPD not on home O2, autoimmune hepatitis on prednisone and tacrolimus, heterozygous prothrombin gene mutation with hx of PE and DVT on coumadin presents to the hospital complaining of cough, hemoptysis, SOB and desaturation to 70s. Pt was found to be flu+ likely viral PNA complicated by diffuse alveolar hemorrhage and ARDS . Hospital course complicated by DVT in L saphenous vein with possible DVT and REJI likely secondary to ATN due to vanco.      #ARDS  1-alveolar hemorrhage   2-flu +ve with post viral pneumonia resolved   3-possibel PE: cant start AC due to alveolar hemorrhage    - intubated and sedated  - s/p DDAVP 3 doses   - Solumedrol 40 q24  - Lasix 40   - s/p cefepime and Levaquin (competed 7 days yesterday) - off Vanc >> kidneys toxicity  - completed oseltamivir 30 mg   - C/w Duoneb   - Bronch results - neg culture, neg fungal, cytology positive for inflammatory cells, no organisms  - c/w MIV, monitor for improved mentation to extubate  - Neurology consulted for poor SAT   - EEG   - If no improvement in 48 hours MRI     #Candidemia   - Exchanged CVC  - blood cultures grew candida  - ID following  - fungitell pend  - procalcitonin pending  - trend CBC     #Acute on Chronic Anemia   - Acute drop in hemoglobin   - Stool clear and OG lavage neg   - 1 unit transfused    #REJI oliguric likely ATN with Vanc toxicity   - acidosis resolved, UO improved   - Cr improving  - no need RRT for now  - FK level therapeutic   - stopping PO Bicarb  - Nephro following  - PTH elevated     - calcitriol 0.25  - BUN elevated despite downtrending Cr   - Dehydration vs over feeding vs medication induced   - steroids decreased   - LR 100cc x 12hr   - Nutrition consult for tube feeds    # 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  - duplex shows DVT consistent with prior  - might need IVC filter  - not candidate for AC at this time due to alveolar hemorrhage     #Immunosuppressed: Autoimmune Hepatitis   - continue with steroid and tacrolimus  - prednisone 60 mg PO qd at home now on solumedrol   - CMV PCR neg this admission     # HTN  - holding amlodipine 10 and lopressor 50   - Monitor BPs    # heterozygous prothrombin gene mutation with hx of PE and DVT on coumadin  - holding coumadin for now due to alveolar hemorrhage   - monitor coags    #0.5 cm of GB polyp  -needs f/u US in 12 months     # Hx of asthma  - C/w montelukast qd    # GI PPx: Protonix 40 mg PO qd  # DVT PPx: sequentials to right leg only  # Activity: bedrest   # Dispo: ICU  # Code Status: FULL

## 2020-02-13 NOTE — PROGRESS NOTE ADULT - SUBJECTIVE AND OBJECTIVE BOX
seen and examined  intubated/ventilated  no pressors   receiving blood tx         PAST HISTORY  --------------------------------------------------------------------------------  No significant changes to PMH, PSH, FHx, SHx, unless otherwise noted    ALLERGIES & MEDICATIONS  --------------------------------------------------------------------------------  Allergies    No Known Allergies    Intolerances      Standing Inpatient Medications  ALBUTerol    90 MICROgram(s) HFA Inhaler 1 Puff(s) Inhalation every 6 hours  calcitriol  Solution 0.25 MICROGram(s) Oral daily  calcium carbonate    500 mG (Tums) Chewable 2 Tablet(s) Chew every 8 hours  caspofungin IVPB      caspofungin IVPB 50 milliGRAM(s) IV Intermittent every 24 hours  chlorhexidine 0.12% Liquid 15 milliLiter(s) Oral Mucosa every 12 hours  chlorhexidine 4% Liquid 1 Application(s) Topical <User Schedule>  cyanocobalamin 1000 MICROGram(s) Oral daily  gabapentin 300 milliGRAM(s) Oral at bedtime  influenza   Vaccine 0.5 milliLiter(s) IntraMuscular once  insulin regular Infusion 4 Unit(s)/Hr IV Continuous <Continuous>  ipratropium 17 MICROgram(s) HFA Inhaler 1 Puff(s) Inhalation every 6 hours  labetalol 100 milliGRAM(s) Oral every 12 hours  lactated ringers. 1000 milliLiter(s) IV Continuous <Continuous>  methylPREDNISolone sodium succinate Injectable 60 milliGRAM(s) IV Push daily  montelukast 10 milliGRAM(s) Oral at bedtime  multivitamin/minerals 1 Tablet(s) Oral daily  mupirocin 2% Ointment 1 Application(s) Topical two times a day  pantoprazole   Suspension 40 milliGRAM(s) Oral daily  propofol Infusion 10 MICROgram(s)/kG/Min IV Continuous <Continuous>  tacrolimus 0.5 milliGRAM(s) Oral every 12 hours    PRN Inpatient Medications  acetaminophen   Tablet .. 650 milliGRAM(s) Oral every 6 hours PRN        VITALS/PHYSICAL EXAM  --------------------------------------------------------------------------------  T(C): 37.2 (02-13-20 @ 00:00), Max: 38 (02-12-20 @ 20:00)  HR: 96 (02-13-20 @ 07:00) (90 - 119)  BP: --  RR: 28 (02-13-20 @ 07:00) (22 - 32)  SpO2: 98% (02-13-20 @ 07:00) (96% - 100%)  Wt(kg): --        02-12-20 @ 07:01  -  02-13-20 @ 07:00  --------------------------------------------------------  IN: 3075 mL / OUT: 1605 mL / NET: 1470 mL      Physical Exam:  	Gen: intubated/ventilated   	Pulm:  B/L xiomara   	CV: S1S2; no rub  	Abd: +distended  	: lory       LABS/STUDIES  --------------------------------------------------------------------------------              5.9    18.98 >-----------<  133      [02-13-20 @ 04:50]              18.3     145  |  101  |  190  ----------------------------<  217      [02-13-20 @ 04:50]  4.4   |  24  |  3.9        Ca     6.9     [02-13-20 @ 04:50]      Mg     2.7     [02-12-20 @ 04:50]      Phos  7.9     [02-12-20 @ 04:50]        Creatinine Trend:  SCr 3.9 [02-13 @ 04:50]  SCr 3.9 [02-12 @ 04:50]  SCr 4.4 [02-11 @ 04:50]  SCr 4.7 [02-10 @ 04:25]  SCr 4.8 [02-09 @ 04:30]    Urinalysis - [02-11-20 @ 19:38]      Color Yellow / Appearance Slightly Turbid / SG 1.012 / pH 6.0      Gluc Negative / Ketone Negative  / Bili Negative / Urobili <2 mg/dL       Blood Moderate / Protein 30 mg/dL / Leuk Est Large / Nitrite Negative      RBC 23 /  / Hyaline 9 / Gran  / Sq Epi  / Non Sq Epi 0 / Bacteria Negative      PTH -- (Ca 7.3)      [02-10-20 @ 18:00]   398  HbA1c 6.9      [03-12-18 @ 04:54]  TSH 0.57      [02-01-20 @ 04:46]  Lipid: chol 203, , HDL 74,       [02-01-20 @ 04:46]      Rheumatoid Factor 59      [02-01-20 @ 04:46]  anti-GBM <1.0      [02-01-20 @ 04:46]  Free Light Chains: kappa 4.52, lambda 3.30, ratio = 1.37      [02-01 @ 04:46]  Immunofixation Serum:   No Monoclonal Band Identified    Reference Range: None Detected      [02-01-20 @ 04:46]  SPEP Interpretation: Normal Electrophoresis Pattern      [02-01-20 @ 04:46]

## 2020-02-13 NOTE — PROGRESS NOTE ADULT - ASSESSMENT
73 y/o female with pmhx of asthma, HTN,  autoimmune hepatitis, heterozygous prothrombin gene mutation with hx of PE and DVT on coumadin admitted for SOB     Acute hypoxic resp failure with hypoxia due to influenza and HCAP, Septic shock in immunocompromised patient  - PE? given thrombosis, not anticoagulated due to aveolar hemorrhage   - remains intubated, sedation held, vent settings decreased  - remains on IV Solu-medrol  - antibiotics and antiviral discontinued/completed  - continue vent support with PEEP decreased down to 8, Duoneb q6h and q4h PRN  - ID and pulm f/u appreciated    Candedemia  - central line change noted  - cultures still +, continue to collect B/C until negative  - obtain 2D echo  - remains lethargic due to infection vs prior sedation    REJI on CKD 3  - intermittent Lasix doses  - off Bicarb  - creatinine trending down  - renal following  - urine output noted  - continue to monitor    Autoimmune hepatitis  - On prednisone 60 mg PO qd and Tacrolimus at home  - now on Solu-medrol  - remains Tacrolimus     HTN  - started on Labetolol    Heterozygous prothrombin gene mutation with hx of PE and DVT on coumadin:  - Coumadin had been held for suspected alveolar hemorrhage     Hx of asthma  - on Montelukast qd    Multiple Skin tears/ wounds  - local care    Diet: tolerating enteral feedings  GI PPx: Protonix 40 mg PO qd  DVT PPx: sequentials  Activity: bedrest  Dispo: readmitted to hospital from  rehab at [facility], ICU for now     Continue supportive measures

## 2020-02-14 NOTE — PROGRESS NOTE ADULT - ASSESSMENT
73 y/o F with REJI in hospital for SOB, hemoptysis, cough.  PMH asthma, COPD not on home O2, autoimmune hepatitis on prednisone and tacrolimus, heterozygous prothrombin gene mutation with hx of PE and DVT on coumadin, recent hospitalization for pneumonia   # REJI/ ATN in nature   # crearinine better   # non oliguric  #  need to hold tacrolimus  in view of infection , if ok with GI   # corrected calcium around 8.2, on Ca carbonate, increase to 3 tablets q 8h as per yesterday note   , PTH noted , on calcitriol 0.25 mcg q24, will not increase in view of high ph   # ? etiology of low h/h , receiving blood tx   # no acute indication for RRT/ d/w MICU team   # will follow  # prognosis guarded

## 2020-02-14 NOTE — CONSULT NOTE ADULT - SUBJECTIVE AND OBJECTIVE BOX
74y female with PMH of asthma, COPD not on home O2, autoimmune hepatitis on prednisone and tacrolimus, heterozygous prothrombin gene mutation with hx of PE and DVT on coumadin presented to the hospital complaining of cough, hemoptysis, SOB and desaturation to 70s. Pt was found to be flu+ likely viral PNA complicated by diffuse alveolar hemorrhage and ARDS . Hospital course complicated by DVT in L saphenous vein with possible DVT and REJI likely secondary to ATN due to vanco. Patient positive for candidemia in blood. Ophthalmology consulted to rule out endophthalmitis. On exam, patient sedated.    POHx: Unknown    Exam:   DVasc: unable to assess  Pupils: 2->1 OD, surgical pupil OS, reactive, no APD   EOM: unable to assess     PLE:  L/L/A: wnl  C/S: w&q OU  K: clr OD, hazy OS  AC: d&q OD, hazy view OS   I/P: flat, PI at 3 o clock OS  Lens:    DFE:  vit: clr OD, hazy view OS  c/d: 0.5, pale OD, no view OS  mac: flat OD, no view OS  ves: normal caliber OD   per:  no heme tears or holes OD to extent visualized OD     Blood Cx 2/11/20: + Candida Albicans   Blood Cx 2/12/20: + Candida Albicans    A/P:  1)     Germania Coronel PGY2 74y female with PMH of asthma, COPD not on home O2, autoimmune hepatitis on prednisone and tacrolimus, heterozygous prothrombin gene mutation with hx of PE and DVT on coumadin presented to the hospital complaining of cough, hemoptysis, SOB and desaturation to 70s. Pt was found to be flu+ likely viral PNA complicated by diffuse alveolar hemorrhage and ARDS . Hospital course complicated by DVT in L saphenous vein with possible DVT and REJI likely secondary to ATN due to vanco. Patient positive for candidemia in blood. Ophthalmology consulted to rule out endophthalmitis. On exam, patient sedated.    POHx: Unknown    Exam:   DVasc: unable to assess  Pupils: 2->1 OD, surgical pupil OS, reactive, no APD   EOM: unable to assess     PLE:  L/L/A: wnl  C/S: w&q OU  K: clr OD, + corneal edema OS   AC: d&q OD, hazy view OS   I/P: flat, PI at 3 o clock OS  Lens: PCIOL OD, ACIOL OS     DFE OD: (no view OS 2/2 corneal edema)   vit: clr   c/d: 0.5, well perfused, peripapillary atrophy  mac: flat   ves: normal caliber   per:  no heme tears or holes OD to extent visualized     Blood Cx 2/11/20: + Candida Albicans   Blood Cx 2/12/20: + Candida Albicans    A/P:  1) R/o endophthalmitis  -no chorioretinal lesions seen OD, hazy view OS 2/2 ACIOL and corneal edema   -c/w antifungal tx per ID       Germania Coronel PGY2  S/w Dr. Ramo Ramirez MD 74y female with PMH of asthma, COPD not on home O2, autoimmune hepatitis on prednisone and tacrolimus, heterozygous prothrombin gene mutation with hx of PE and DVT on coumadin presented to the hospital complaining of cough, hemoptysis, SOB and desaturation to 70s. Pt was found to be flu+ likely viral PNA complicated by diffuse alveolar hemorrhage and ARDS . Hospital course complicated by DVT in L saphenous vein with possible DVT and REJI likely secondary to ATN due to vanco. Patient positive for candidemia in blood. Ophthalmology consulted to rule out endophthalmitis. On exam, patient sedated.    POHx: Unknown    Exam:   DVasc: unable to assess  Pupils: 2->1 OD, surgical pupil OS, reactive, no APD   EOM: unable to assess     PLE:  L/L/A: wnl  C/S: w&q OU  K: clr OD, + corneal edema OS   AC: d&q OD, hazy view OS   I/P: flat, PI at 3 o clock OS  Lens: PCIOL OD, ACIOL OS     DFE OD: (no view OS 2/2 corneal edema)   vit: clr   c/d: 0.5, well perfused, peripapillary atrophy  mac: flat   ves: normal caliber   per:  no heme tears or holes OD to extent visualized     Blood Cx 2/11/20: + Candida Albicans   Blood Cx 2/12/20: + Candida Albicans    A/P:  1) R/o endophthalmitis  -no chorioretinal lesions seen OD, hazy view OS 2/2 ACIOL and corneal edema, low clinical suspicion for ocular fungemia/endogenous endophthalmitis   -c/w antifungal tx per ID       Germania Coronel PGY2  S/w Dr. Ramo Ramirez MD

## 2020-02-14 NOTE — PROGRESS NOTE ADULT - SUBJECTIVE AND OBJECTIVE BOX
Nephrology progress note    THIS IS AN INCOMPLETE NOTE . FULL NOTE TO FOLLOW SHORTLY    Patient is seen and examined, events over the last 24 h noted .    Allergies:  No Known Allergies    Hospital Medications:   MEDICATIONS  (STANDING):  ALBUTerol    90 MICROgram(s) HFA Inhaler 1 Puff(s) Inhalation every 6 hours  calcitriol  Solution 0.25 MICROGram(s) Oral daily  calcium carbonate    500 mG (Tums) Chewable 2 Tablet(s) Chew every 8 hours  caspofungin IVPB 50 milliGRAM(s) IV Intermittent every 24 hours  cyanocobalamin 1000 MICROGram(s) Oral daily  gabapentin 300 milliGRAM(s) Oral at bedtime  influenza   Vaccine 0.5 milliLiter(s) IntraMuscular once  insulin regular Infusion 4 Unit(s)/Hr (4 mL/Hr) IV Continuous <Continuous>  ipratropium 17 MICROgram(s) HFA Inhaler 1 Puff(s) Inhalation every 6 hours  labetalol 100 milliGRAM(s) Oral every 12 hours  lactated ringers. 1000 milliLiter(s) (75 mL/Hr) IV Continuous <Continuous>  methylPREDNISolone sodium succinate Injectable 40 milliGRAM(s) IV Push daily  montelukast 10 milliGRAM(s) Oral at bedtime  multivitamin/minerals 1 Tablet(s) Oral daily  mupirocin 2% Ointment 1 Application(s) Topical two times a day  pantoprazole   Suspension 40 milliGRAM(s) Oral daily  propofol Infusion 10 MICROgram(s)/kG/Min (4.89 mL/Hr) IV Continuous <Continuous>  tacrolimus 0.5 milliGRAM(s) Oral every 12 hours        VITALS:  T(F): 96.8 (20 @ 04:00), Max: 98.3 (20 @ 20:00)  HR: 82 (20 @ 07:00)  BP: 115/60 (20 @ 07:00)  RR: 23 (20 @ 07:00)  SpO2: 100% (20 @ 07:00)  Wt(kg): --     @ 07:01  -   @ 07:00  --------------------------------------------------------  IN: 3075 mL / OUT: 1605 mL / NET: 1470 mL     @ 07:01  -   @ 07:00  --------------------------------------------------------  IN: 2275 mL / OUT: 1965 mL / NET: 310 mL      2020 07:01  -  2020 07:00  --------------------------------------------------------  IN:    dextrose 10%: 250 mL    Enteral Tube Flush: 50 mL    insulin regular Infusion: 166 mL    IV PiggyBack: 569 mL    Lactated Ringers IV Bolus: 500 mL    Nepro with Carb Steady: 740 mL  Total IN: 2275 mL    OUT:    Indwelling Catheter - Urethral: 1965 mL  Total OUT: 1965 mL    Total NET: 310 mL            PHYSICAL EXAM:  Constitutional: NAD  HEENT: anicteric sclera, oropharynx clear, MMM  Neck: No JVD  Respiratory: CTAB, no wheezes, rales or rhonchi  Cardiovascular: S1, S2, RRR  Gastrointestinal: BS+, soft, NT/ND  Extremities: No cyanosis or clubbing. No peripheral edema  :  No henley.   Skin: No rashes    LABS:      151<H>  |  107  |  186<HH>  ----------------------------<  130<H>  3.6   |  27  |  3.2<H>    Creatinine Trend: 3.2<--, 3.9<--, 3.9<--, 4.4<--, 4.7<--, 4.8<--    Ca    7.1<L>      2020 04:50                            7.6    17.13 )-----------( 119      ( 2020 04:50 )             23.0       Urine Studies:  Urinalysis Basic - ( 2020 19:38 )    Color: Yellow / Appearance: Slightly Turbid / S.012 / pH:   Gluc:  / Ketone: Negative  / Bili: Negative / Urobili: <2 mg/dL   Blood:  / Protein: 30 mg/dL / Nitrite: Negative   Leuk Esterase: Large / RBC: 23 /HPF /  /HPF   Sq Epi:  / Non Sq Epi: 0 /HPF / Bacteria: Negative        RADIOLOGY & ADDITIONAL STUDIES: Nephrology progress note  Patient is seen and examined, events over the last 24 h noted .  opens on MV   non oliguric     Allergies:  No Known Allergies    Hospital Medications:   MEDICATIONS  (STANDING):  ALBUTerol    90 MICROgram(s) HFA Inhaler 1 Puff(s) Inhalation every 6 hours  calcitriol  Solution 0.25 MICROGram(s) Oral daily  calcium carbonate    500 mG (Tums) Chewable 2 Tablet(s) Chew every 8 hours  caspofungin IVPB 50 milliGRAM(s) IV Intermittent every 24 hours  cyanocobalamin 1000 MICROGram(s) Oral daily  gabapentin 300 milliGRAM(s) Oral at bedtime  influenza   Vaccine 0.5 milliLiter(s) IntraMuscular once  insulin regular Infusion 4 Unit(s)/Hr (4 mL/Hr) IV Continuous <Continuous>  ipratropium 17 MICROgram(s) HFA Inhaler 1 Puff(s) Inhalation every 6 hours  labetalol 100 milliGRAM(s) Oral every 12 hours  lactated ringers. 1000 milliLiter(s) (75 mL/Hr) IV Continuous <Continuous>  methylPREDNISolone sodium succinate Injectable 40 milliGRAM(s) IV Push daily  montelukast 10 milliGRAM(s) Oral at bedtime  multivitamin/minerals 1 Tablet(s) Oral daily  mupirocin 2% Ointment 1 Application(s) Topical two times a day  pantoprazole   Suspension 40 milliGRAM(s) Oral daily  propofol Infusion 10 MICROgram(s)/kG/Min (4.89 mL/Hr) IV Continuous <Continuous>  tacrolimus 0.5 milliGRAM(s) Oral every 12 hours        VITALS:  T(F): 96.8 (20 @ 04:00), Max: 98.3 (20 @ 20:00)  HR: 82 (20 @ 07:00)  BP: 115/60 (20 @ 07:00)  RR: 23 (20 @ 07:00)  SpO2: 100% (20 @ 07:00)       07:01  -   @ 07:00  --------------------------------------------------------  IN: 3075 mL / OUT: 1605 mL / NET: 1470 mL     @ 07:01  -   @ 07:00  --------------------------------------------------------  IN: 2275 mL / OUT: 1965 mL / NET: 310 mL      2020 07:  -  2020 07:00  --------------------------------------------------------  IN:    dextrose 10%: 250 mL    Enteral Tube Flush: 50 mL    insulin regular Infusion: 166 mL    IV PiggyBack: 569 mL    Lactated Ringers IV Bolus: 500 mL    Nepro with Carb Steady: 740 mL  Total IN: 2275 mL    OUT:    Indwelling Catheter - Urethral: 1965 mL  Total OUT: 1965 mL    Total NET: 310 mL            PHYSICAL EXAM:  Constitutional: intubated on MV   HEENT: anicteric sclera, oropharynx clear, MMM  Neck: No JVD  Respiratory: CTAB, no wheezes, rales or rhonchi  Cardiovascular: S1, S2, RRR  Gastrointestinal: BS+, soft, NT/ND  Extremities: No cyanosis or clubbing. trace peripheral edema   :  No henley.   Skin: No rashes    LABS:      151<H>  |  107  |  186<HH>  ----------------------------<  130<H>  3.6   |  27  |  3.2<H>    Creatinine Trend: 3.2<--, 3.9<--, 3.9<--, 4.4<--, 4.7<--, 4.8<--    Ca    7.1<L>      2020 04:50                            7.6    17.13 )-----------( 119      ( 2020 04:50 )             23.0       Urine Studies:  Urinalysis Basic - ( 2020 19:38 )    Color: Yellow / Appearance: Slightly Turbid / S.012 / pH:   Gluc:  / Ketone: Negative  / Bili: Negative / Urobili: <2 mg/dL   Blood:  / Protein: 30 mg/dL / Nitrite: Negative   Leuk Esterase: Large / RBC: 23 /HPF /  /HPF   Sq Epi:  / Non Sq Epi: 0 /HPF / Bacteria: Negative        RADIOLOGY & ADDITIONAL STUDIES:

## 2020-02-14 NOTE — PROGRESS NOTE ADULT - SUBJECTIVE AND OBJECTIVE BOX
Hospital Day:  14d    Subjective:    Pt was interviewed and examined at the bedside in the AM. No acute events overnight.   Intubated. Opens eyes but does not follow commands.        Past Medical Hx:   Prothrombin gene mutation  Autoimmune hepatitis  Other chronic pulmonary embolism without acute cor pulmonale  Essential hypertension  Autoimmune hepatitis treated with steroids  Asthma  DVT (deep venous thrombosis)    Past Sx:  S/P debridement  History of back surgery  No significant past surgical history    Allergies:  No Known Allergies    Current Meds:   Standng Meds:  ALBUTerol    90 MICROgram(s) HFA Inhaler 1 Puff(s) Inhalation every 6 hours  calcitriol  Solution 0.25 MICROGram(s) Oral daily  calcium carbonate    500 mG (Tums) Chewable 2 Tablet(s) Chew every 8 hours  caspofungin IVPB 50 milliGRAM(s) IV Intermittent every 24 hours  chlorhexidine 4% Liquid 1 Application(s) Topical <User Schedule>  cyanocobalamin 1000 MICROGram(s) Oral daily  gabapentin 300 milliGRAM(s) Oral at bedtime  influenza   Vaccine 0.5 milliLiter(s) IntraMuscular once  insulin regular Infusion 4 Unit(s)/Hr (4 mL/Hr) IV Continuous <Continuous>  ipratropium 17 MICROgram(s) HFA Inhaler 1 Puff(s) Inhalation every 6 hours  labetalol 100 milliGRAM(s) Oral every 12 hours  lactated ringers. 1000 milliLiter(s) (75 mL/Hr) IV Continuous <Continuous>  methylPREDNISolone sodium succinate Injectable 40 milliGRAM(s) IV Push daily  montelukast 10 milliGRAM(s) Oral at bedtime  multivitamin/minerals 1 Tablet(s) Oral daily  mupirocin 2% Ointment 1 Application(s) Topical two times a day  pantoprazole   Suspension 40 milliGRAM(s) Oral daily  propofol Infusion 10 MICROgram(s)/kG/Min (4.89 mL/Hr) IV Continuous <Continuous>  tacrolimus 0.5 milliGRAM(s) Oral every 12 hours    PRN Meds:  acetaminophen   Tablet .. 650 milliGRAM(s) Oral every 6 hours PRN Temp greater or equal to 38C (100.4F)    HOME MEDICATIONS:  acetaminophen 500 mg oral tablet: 2 tab(s) orally every 8 hours  amLODIPine 10 mg oral tablet: 1 tab(s) orally once a day (at bedtime)  budesonide 3 mg oral capsule, extended release: 1 cap(s) orally 2 times a day  ferrous sulfate 325 mg (65 mg elemental iron) oral delayed release tablet: 1 tab(s) orally once a day  furosemide 20 mg oral tablet: 1 tab(s) orally once a day  gabapentin 300 mg oral capsule: 1 cap(s) orally once a day (at bedtime)  ibuprofen 400 mg oral tablet: 1 tab(s) orally every 6 hours, As needed, Moderate Pain (4 - 6)  ipratropium-albuterol 0.5 mg-2.5 mg/3 mLinhalation solution: 3 milliliter(s) inhaled every 6 hours, As Needed  lidocaine 5% topical film: Apply topically to affected area once a day  Metoprolol Tartrate 50 mg oral tablet: 1 tab(s) orally once a day  montelukast 10 mg oral tablet: 1 tab(s) orally once a day (at bedtime)  Multiple Vitamins with Minerals oral tablet: 1 tab(s) orally once a day  oxyCODONE 10 mg oral tablet: 1 tab(s) orally every 6 hours, As needed, Severe Pain (7 - 10)  predniSONE 20 mg oral tablet: 3 tab(s) orally once a day  ProAir HFA 90 mcg/inh inhalation aerosol: 1 puff(s) inhaled every 4 hours, As Needed  risedronate 150 mg oral tablet: 1 tab(s) orally once a month  tacrolimus 0.5 mg oral capsule: 1 cap(s) orally every 12 hours  tiotropium 18 mcg inhalation capsule: 1 cap(s) inhaled once a day, As Needed  Vitamin B12 1000 mcg oral tablet: 1 tab(s) orally once a day  Vitamin C 1000 mg oral tablet: 1 tab(s) orally once a day  warfarin 4 mg oral tablet: 1 tab(s) orally once a day (at bedtime)      Vital Signs:   T(F): 97 (20 @ 08:00), Max: 98.3 (20 @ 20:00)  HR: 92 (20 @ 10:00) (75 - 114)  BP: 138/65 (20 @ 10:00) (79/41 - 138/65)  RR: 26 (20 @ 10:00) (22 - 43)  SpO2: 97% (20 @ 10:00) (96% - 100%)      20 @ 07:01  -  02-14-20 @ 07:00  --------------------------------------------------------  IN: 2275 mL / OUT:  mL / NET: 260 mL    20 @ 07:01  -  20 @ 11:14  --------------------------------------------------------  IN: 0 mL / OUT: 105 mL / NET: -105 mL        Physical Exam:   CONSTITUTIONAL: NAD, lethargic   HEAD: Normocephalic; atraumatic, ET+ OG+   EYES: PERRL, EOMI, no conjunctival erythema  CARD: +S1, S2 Regular rate and rhythm, tachy  RESP: b/l ronchi  ABD: soft nontender, distended, no rebound, no guarding, no rigidity,   EXT: edema on limbs, wounds on b/l arms   NEURO: opens eyes, does not follows commands, to weak to move limbs   Lines: L IJ         Labs:                         7.6    17.13 )-----------( 119      ( 2020 04:50 )             23.0     Neutophil% 92.6, Lymphocyte% 3.6, Monocyte% 1.4, Bands% 2.1 20 @ 04:50    2020 04:50    151    |  107    |  186    ----------------------------<  130    3.6     |  27     |  3.2      Ca    7.1        2020 04:50              Serum Pro-Brain Natriuretic Peptide: 7750 pg/mL (20 @ 11:04)          Urinalysis Basic - ( 2020 19:38 )    Color: Yellow / Appearance: Slightly Turbid / S.012 / pH: x  Gluc: x / Ketone: Negative  / Bili: Negative / Urobili: <2 mg/dL   Blood: x / Protein: 30 mg/dL / Nitrite: Negative   Leuk Esterase: Large / RBC: 23 /HPF /  /HPF   Sq Epi: x / Non Sq Epi: 0 /HPF / Bacteria: Negative          Culture - Blood (collected 20 @ 04:50)  Source: .Blood None  Gram Stain (20 @ 14:24):    Growth in aerobic bottle: Yeast like cells  Preliminary Report (20 @ 14:25):    Growth in aerobic bottle: Yeast like cells    Culture - Blood (collected 20 @ 11:21)  Source: .Blood None  Gram Stain (20 @ 16:48):    Growth in aerobic bottle: Yeast like cells  Preliminary Report (20 @ 07:30):    Growth in aerobic bottle: Candida albicans    ***Blood Panel PCR results on this specimen are available    approximately 3 hours after the Gram stain result.***    Gram stain, PCR, and/or culture results may not always    correspond due to difference in methodologies.    ************************************************************    This PCR assay was performed using [employer].    The following targets are tested for: Enterococcus,    vancomycin resistant enterococci, Listeria monocytogenes,    coagulase negative staphylococci, S. aureus,    methicillin resistant S. aureus, Streptococcus agalactiae    (Group B), S. pneumoniae, S. pyogenes (Group A),    Acinetobacter baumannii, Enterobacter cloacae, E. coli,    Klebsiella oxytoca, K. pneumoniae, Proteus sp.,    Serratia marcescens, Haemophilus influenzae,    Neisseria meningitidis, Pseudomonas aeruginosa, Candida    albicans, C. glabrata, C krusei, C parapsilosis,    C. tropicalis and the KPC resistance gene.  Organism: Blood Culture PCR (20 @ 18:21)  Organism: Blood Culture PCR (20 @ 18:21)      -  Candida albicans: Detec      Method Type: PCR        Radiology:

## 2020-02-14 NOTE — PROGRESS NOTE ADULT - SUBJECTIVE AND OBJECTIVE BOX
DONI PATRICK  75y, Female  Allergy: No Known Allergies      LOS  14d    CHIEF COMPLAINT: SOB and desaturation (14 Feb 2020 08:59)      INTERVAL EVENTS/HPI  - No acute events overnight  - T(F): , Max: 98.3 (02-13-20 @ 20:00)  - Denies any worsening symptoms  - Tolerating medication  - WBC Count: 17.13 (02-14-20 @ 04:50)  WBC Count: 18.91 (02-13-20 @ 12:38)  - Creatinine, Serum: 3.2 (02-14-20 @ 04:50)  Creatinine, Serum: 3.9 (02-13-20 @ 04:50)       ROS  General: Denies rigors, nightsweats  HEENT: Denies headache, rhinorrhea, sore throat, eye pain  CV: Denies CP, palpitations  PULM: Denies wheezing, hemoptysis  GI: Denies hematemesis, hematochezia, melena  : Denies discharge, hematuria  MSK: Denies arthralgias, myalgias  SKIN: Denies rash, lesions  NEURO: Denies paresthesias, weakness  PSYCH: Denies depression, anxiety    VITALS:  T(F): 97, Max: 98.3 (02-13-20 @ 20:00)  HR: 108  BP: 129/65  RR: 29Vital Signs Last 24 Hrs  T(C): 36.1 (14 Feb 2020 08:00), Max: 36.8 (13 Feb 2020 20:00)  T(F): 97 (14 Feb 2020 08:00), Max: 98.3 (13 Feb 2020 20:00)  HR: 108 (14 Feb 2020 09:00) (80 - 114)  BP: 129/65 (14 Feb 2020 09:00) (79/41 - 134/66)  BP(mean): 83 (14 Feb 2020 09:00) (53 - 94)  RR: 29 (14 Feb 2020 09:00) (22 - 43)  SpO2: 97% (14 Feb 2020 09:00) (96% - 100%)    PHYSICAL EXAM:  Gen: intubated  HEENT: Normocephalic, atraumatic  Neck: supple, no lymphadenopathy  CV: Regular rate & regular rhythm  Lungs: decreased BS at bases, no fremitus  Abdomen: Soft, BS present  Ext: Warm, well perfused, anasarca  Neuro: off sedation, opens eyes, alert  Skin: edema/erythema, ecchymoses UE LE, open wound L knee, L wrist  Lines: lines removed      FH: Non-contributory  Social Hx: Non-contributory    TESTS & MEASUREMENTS:                        7.6    17.13 )-----------( 119      ( 14 Feb 2020 04:50 )             23.0     02-14    151<H>  |  107  |  186<HH>  ----------------------------<  130<H>  3.6   |  27  |  3.2<H>    Ca    7.1<L>      14 Feb 2020 04:50      eGFR if Non African American: 13 mL/min/1.73M2 (02-14-20 @ 04:50)  eGFR if African American: 16 mL/min/1.73M2 (02-14-20 @ 04:50)          Culture - Blood (collected 02-12-20 @ 04:50)  Source: .Blood None  Gram Stain (02-13-20 @ 14:24):    Growth in aerobic bottle: Yeast like cells  Preliminary Report (02-13-20 @ 14:25):    Growth in aerobic bottle: Yeast like cells    Culture - Blood (collected 02-11-20 @ 11:21)  Source: .Blood None  Gram Stain (02-12-20 @ 16:48):    Growth in aerobic bottle: Yeast like cells  Preliminary Report (02-14-20 @ 07:30):    Growth in aerobic bottle: Candida albicans    ***Blood Panel PCR results on this specimen are available    approximately 3 hours after the Gram stain result.***    Gram stain, PCR, and/or culture results may not always    correspond due to difference in methodologies.    ************************************************************    This PCR assay was performed using [employer].    The following targets are tested for: Enterococcus,    vancomycin resistant enterococci, Listeria monocytogenes,    coagulase negative staphylococci, S. aureus,    methicillin resistant S. aureus, Streptococcus agalactiae    (Group B), S. pneumoniae, S. pyogenes (Group A),    Acinetobacter baumannii, Enterobacter cloacae, E. coli,    Klebsiella oxytoca, K. pneumoniae, Proteus sp.,    Serratia marcescens, Haemophilus influenzae,    Neisseria meningitidis, Pseudomonas aeruginosa, Candida    albicans, C. glabrata, C krusei, C parapsilosis,    C. tropicalis and the KPC resistance gene.  Organism: Blood Culture PCR (02-12-20 @ 18:21)  Organism: Blood Culture PCR (02-12-20 @ 18:21)      -  Candida albicans: Detec      Method Type: PCR            INFECTIOUS DISEASES TESTING  Fungitell: >500 (02-11-20 @ 11:21)  Fungitell: <31 (02-04-20 @ 04:40)  Streptococcus Pneumoniae Ag Urine: Negative (02-02-20 @ 11:00)  MRSA PCR Result.: Positive (02-01-20 @ 20:40)  Fungitell: 49 (02-01-20 @ 11:17)  Rapid RVP Result: Detected (01-31-20 @ 16:24)  Legionella Antigen, Urine: Negative (01-31-20 @ 15:36)  Streptococcus Pneumoniae Ag Urine: Negative (01-31-20 @ 15:36)  Legionella Antigen, Urine: Negative (01-20-20 @ 02:50)  MRSA PCR Result.: Positive (01-14-20 @ 13:59)  MRSA PCR Result.: Negative (08-13-19 @ 08:46)      RADIOLOGY & ADDITIONAL TESTS:  I have personally reviewed the last available Chest xray  CXR      CT      CARDIOLOGY TESTING  12 Lead ECG:   Ventricular Rate 118 BPM    Atrial Rate 118 BPM    P-R Interval 150 ms    QRS Duration 80 ms    Q-T Interval 326 ms    QTC Calculation(Bezet) 456 ms    P Axis 41 degrees    R Axis -10 degrees    T Axis 11 degrees    Diagnosis Line Sinus tachycardia  Possible Left atrial enlargement  Inferior infarct , age undetermined  Abnormal ECG    Confirmed by MOHSEN SONG MD ([id number]) on 1/31/2020 12:38:55 PM (01-31-20 @ 11:06)      MEDICATIONS  ALBUTerol    90 MICROgram(s) HFA Inhaler 1  calcitriol  Solution 0.25  calcium carbonate    500 mG (Tums) Chewable 2  caspofungin IVPB 50  chlorhexidine 0.12% Liquid 15  chlorhexidine 4% Liquid 1  cyanocobalamin 1000  gabapentin 300  influenza   Vaccine 0.5  insulin regular Infusion 4  ipratropium 17 MICROgram(s) HFA Inhaler 1  labetalol 100  lactated ringers. 1000  methylPREDNISolone sodium succinate Injectable 40  montelukast 10  multivitamin/minerals 1  mupirocin 2% Ointment 1  pantoprazole   Suspension 40  propofol Infusion 10  tacrolimus 0.5      WEIGHT  Weight (kg): 81.5 (02-03-20 @ 12:15)    ANTIBIOTICS:  caspofungin IVPB 50 milliGRAM(s) IV Intermittent every 24 hours      All available historical records have been reviewed

## 2020-02-14 NOTE — PROGRESS NOTE ADULT - ASSESSMENT
IMPRESSION:    Acute hypoxic respiratory failure  DAH; improved  Influenza A treated   ARDS  Candidemia   REJI improving   HO Autoimmune hepatitis on tacrolimus and chronic steroids   h/o hypercoagulable state/ vte was on coumadin   Left great saphenous vein thrombosis chronic    PLAN:    CNS:  Keep off sedation.  FU MS.      HEENT: ET care.  Oral care     PULMONARY:  HOB @ 45 degrees. Solumedrol 40 mg qd for now. Wean O2.  SBT    CARDIOVASCULAR:  I=O.  LR 75 cc pr hour    GI: GI prophylaxis.  Hold feeding for SBT.      RENAL:  Follow up lytes. Replete as needed.  FU with Renal.      INFECTIOUS DISEASE: Follow up cultures.  Continue Anti fungal     HEMATOLOGICAL:  DVT prophylaxis.  fu cbc     ENDOCRINE:  Follow up FS.  Insulin protocol if needed.    MUSCULOSKELETAL: Bed rest     Hogan for retention     Code status: full     Prognosis: Poor prognosis     Continue MICU monitoring for now.

## 2020-02-14 NOTE — PROGRESS NOTE ADULT - ASSESSMENT
ASSESSMENT  73y/o F w/ hx of asthma, COPD not on home O2, autoimmune hepatitis on prednisone and tacrolimus, heterozygous prothrombin gene mutation with hx of PE and DVT on coumadin with recent hospitalization in January 2020 with a diagnosis of pneumonia presents for worsening SOB, hemoptysis and cough.    IMPRESSION  #Candidemia Fungitell: >500 (02-11-20 @ 11:21)    2/11 BCX +, 2/12 BCX +, RIJ 2/11. Groin a-line 2/4- removed 2/13     Fungitell: <31 (02-04-20 @ 04:40), Fungitell: 49 (02-01-20 @ 11:17)  #Flu + AH1, Alveolar hemorrhage, ?superimposed atypical PNA (imaging not really consistent with bacterial PNA). Recent bronch 1/20 negative for PCP, Fungal, etc, previous bronch cx with yeast (likely oral contaminant) since GMS neg    2/3 Bronch   No organisms seen, 2/3 cytopath Rare atypical cells, few ciliated bronchial cells, alveolar macrophages and inflammatory cells, mainly neutrophils.    Strep urine Ag neg, serum crypto Ag neg, CMV PCR undetectable, AFB neg    Quantiferon indeterminate    Lactate Dehydrogenase, Serum: 607 (02.03.20 @ 04:30)    Procalcitonin, Serum: 1.86:    CTA chest showing no PE but showing interval development of multifocal bilateral groundglass opacities as well as new moderate to severe bronchiectasis in the right lung,   1/13 CT b/l GGOs, compatible with intersitial edema      Serum Pro-Brain Natriuretic Peptide: 722 pg/mL (01.31.20 @ 09:25)<-- Serum Pro-Brain Natriuretic Peptide: 345 pg/mL (01.13.20 @ 12:10)    During last hospitalization: bronch cx bacterial NG, +yeast, pending ID, neg legionella, + MRSA PCR    MRSA PCR Result.: Positive (02-01-20 @ 20:40)  #Severe sepsis on admission P>90, WBC 19, RR>20, lactic acidosis Lactate, Blood: 2.9 mmol/L (01-31-20 @ 09:25)    afebrile   #Elevated Alk ph, transaminitis  #LLE wound, not infection     12/7 WCX MSSA, Kleb, bacteroides    8/2019 MRSA in a wound   #Immunosuppressed: autoimmune hepatitis on prednisone and tacrolimus    RECOMMENDATIONS  - Lines removed  - As C. albicans, can change caspo 50mg daily if QTC <450 to Fluconazole crcl 16 loading dose 400mg x1 then 200mg daily IV, if QTC prolonged, continue Caspo    QTC Calculation(Bezet) 456 ms  - Repeat Blood cx until clearance  - Ophtho exam r/o endophthalmitis   - Trend WBC  - completed oseltamivir & abx s/p cefepime/levaquin  - on methylPREDNISolone sodium succinate Injectable 40    Spectra [id number]

## 2020-02-14 NOTE — PROGRESS NOTE ADULT - SUBJECTIVE AND OBJECTIVE BOX
Patient is a 75y old  Female who presents with a chief complaint of SOB and desaturation (13 Feb 2020 22:03)        Over Night Events: Remains on MV.  Off pressors.          ROS:     CONSTITUTIONAL:   no fever   no chills.  no weight gain   no weight loss    EYES:   no discharge,   no pain  no redness,   no visual changes.    ENT:   Ears: no ear pain and no hearing problems.  Nose: no nasal congestion and no nasal drainage.  Mouth/Throat: no dysphagia,  no hoarseness and no throat pain.  Neck: no lumps, no pain, no stiffness and no swollen glands.     CARDIOVASCULAR:   no chest pain,   no swelling  no palpitaions  no syncope    RESPIRATORY:  Per HPI     GASTROINTESTINAL:   no abdominal pain,   no constipation,   no diarrhea,   no vomiting.    GENITOURINARY:  no dysuria,   no frequency,   no urgency  no hematuria.    MUSCULOSKELETAL:   no back pain,   no musculoskeletal pain,  no weakness.    SKIN:   no jaundice,   no lesions,   no pruritis,   no rashes.    NEURO:   Per HPI     PSYCHIATRIC:   no known mental health issues  no anxiety  no depression    ALLERGIC/IMMUNOLOGIC:   No active allergic or immunologic issues        PHYSICAL EXAM    ICU Vital Signs Last 24 Hrs  T(C): 36 (14 Feb 2020 04:00), Max: 36.8 (13 Feb 2020 20:00)  T(F): 96.8 (14 Feb 2020 04:00), Max: 98.3 (13 Feb 2020 20:00)  HR: 82 (14 Feb 2020 07:00) (80 - 114)  BP: 115/60 (14 Feb 2020 07:00) (79/41 - 122/60)  BP(mean): 76 (14 Feb 2020 07:00) (53 - 94)  ABP: 136/54 (13 Feb 2020 13:00) (102/44 - 136/54)  ABP(mean): 84 (13 Feb 2020 13:00) (64 - 84)  RR: 23 (14 Feb 2020 07:00) (22 - 43)  SpO2: 100% (14 Feb 2020 07:00) (96% - 100%)      CONSTITUTIONAL:   Ill appearing.  Well nourished.  NAD    ENT:   Airway patent,   Mouth with normal mucosa.   No thrush    CARDIAC:   Normal rate,   Regular rhythm.     No edema      RESPIRATORY:   NO wheezing  Bilateral BS  Normal chest expansion  Not tachypneic,  No use of accessory muscles    GASTROINTESTINAL:  Abdomen soft,   Non-tender,   No guarding,   + BS    MUSCULOSKELETAL:   Range of motion is not limited,  No clubbing, cyanosis    NEUROLOGICAL:   Opens eyes.    Does not follow commands     SKIN:   Skin normal color for race,   warm, dry   No evidence of rash.        02-13-20 @ 07:01  -  02-14-20 @ 07:00  --------------------------------------------------------  IN:    dextrose 10%.: 250 mL    Enteral Tube Flush: 50 mL    insulin regular Infusion: 166 mL    IV PiggyBack: 569 mL    Lactated Ringers IV Bolus: 500 mL    Nepro with Carb Steady: 740 mL  Total IN: 2275 mL    OUT:    Indwelling Catheter - Urethral: 1965 mL  Total OUT: 1965 mL    Total NET: 310 mL          LABS:                            7.6    17.13 )-----------( 119      ( 14 Feb 2020 04:50 )             23.0                                               02-14    151<H>  |  107  |  186<HH>  ----------------------------<  130<H>  3.6   |  27  |  3.2<H>    Ca    7.1<L>      14 Feb 2020 04:50                                                                                                                                      Culture - Blood (collected 12 Feb 2020 04:50)  Source: .Blood None  Gram Stain (13 Feb 2020 14:24):    Growth in aerobic bottle: Yeast like cells  Preliminary Report (13 Feb 2020 14:25):    Growth in aerobic bottle: Yeast like cells    Culture - Blood (collected 11 Feb 2020 11:21)  Source: .Blood None  Gram Stain (12 Feb 2020 16:48):    Growth in aerobic bottle: Yeast like cells  Preliminary Report (14 Feb 2020 07:30):    Growth in aerobic bottle: Candida albicans    ***Blood Panel PCR results on this specimen are available    approximately 3 hours after the Gram stain result.***    Gram stain, PCR, and/or culture results may not always    correspond due to difference in methodologies.    ************************************************************    This PCR assay was performed using [employer].    The following targets are tested for: Enterococcus,    vancomycin resistant enterococci, Listeria monocytogenes,    coagulase negative staphylococci, S. aureus,    methicillin resistant S. aureus, Streptococcus agalactiae    (Group B), S. pneumoniae, S. pyogenes (Group A),    Acinetobacter baumannii, Enterobacter cloacae, E. coli,    Klebsiella oxytoca, K. pneumoniae, Proteus sp.,    Serratia marcescens, Haemophilus influenzae,    Neisseria meningitidis, Pseudomonas aeruginosa, Candida    albicans, C. glabrata, C krusei, C parapsilosis,    C. tropicalis and the KPC resistance gene.  Organism: Blood Culture PCR (12 Feb 2020 18:21)  Organism: Blood Culture PCR (12 Feb 2020 18:21)                                                   Mode: AC/ CMV (Assist Control/ Continuous Mandatory Ventilation)  RR (machine): 22  TV (machine): 450  FiO2: 40  PEEP: 8  ITime: 1  MAP: 13  PIP: 26                                      ABG - ( 13 Feb 2020 08:58 )  pH, Arterial: 7.41  pH, Blood: x     /  pCO2: 42    /  pO2: 81    / HCO3: 26    / Base Excess: 1.6   /  SaO2: 96                  MEDICATIONS  (STANDING):  ALBUTerol    90 MICROgram(s) HFA Inhaler 1 Puff(s) Inhalation every 6 hours  calcitriol  Solution 0.25 MICROGram(s) Oral daily  calcium carbonate    500 mG (Tums) Chewable 2 Tablet(s) Chew every 8 hours  caspofungin IVPB      caspofungin IVPB 50 milliGRAM(s) IV Intermittent every 24 hours  chlorhexidine 0.12% Liquid 15 milliLiter(s) Oral Mucosa every 12 hours  chlorhexidine 4% Liquid 1 Application(s) Topical <User Schedule>  cyanocobalamin 1000 MICROGram(s) Oral daily  dextrose 10%. 250 milliLiter(s) (1000 mL/Hr) IV Continuous <Continuous>  gabapentin 300 milliGRAM(s) Oral at bedtime  influenza   Vaccine 0.5 milliLiter(s) IntraMuscular once  insulin regular Infusion 4 Unit(s)/Hr (4 mL/Hr) IV Continuous <Continuous>  ipratropium 17 MICROgram(s) HFA Inhaler 1 Puff(s) Inhalation every 6 hours  labetalol 100 milliGRAM(s) Oral every 12 hours  methylPREDNISolone sodium succinate Injectable 60 milliGRAM(s) IV Push daily  montelukast 10 milliGRAM(s) Oral at bedtime  multivitamin/minerals 1 Tablet(s) Oral daily  mupirocin 2% Ointment 1 Application(s) Topical two times a day  pantoprazole   Suspension 40 milliGRAM(s) Oral daily  propofol Infusion 10 MICROgram(s)/kG/Min (4.89 mL/Hr) IV Continuous <Continuous>  tacrolimus 0.5 milliGRAM(s) Oral every 12 hours    MEDICATIONS  (PRN):  acetaminophen   Tablet .. 650 milliGRAM(s) Oral every 6 hours PRN Temp greater or equal to 38C (100.4F)      New X-rays reviewed:                                                                                  ECHO    CXR interpreted by me:  ET OG OK.  Improved infiltrates

## 2020-02-14 NOTE — PROGRESS NOTE ADULT - SUBJECTIVE AND OBJECTIVE BOX
ELDER, DONI  75y  Female  HPI:  75y/o F w/ hx of asthma, COPD not on home O2, autoimmune hepatitis on prednisone and tacrolimus, heterozygous prothrombin gene mutation with hx of PE and DVT on coumadin with recent hospitalization in January 2020 with a diagnosis of pneumonia presents for worsening SOB, hemoptysis and cough. Everything started yesterday night when patient was in bed, started to feel shortness of breath and had many episodes of hemoptysis with clots, she also had substernal chest pain non radiating, moderate in intensity that worsens on coughing. She denies fever but endorses chills. She also admits for lightheadedness that occurred yesterday, no HA, abd pain, dysuria or paresthesias. She had 2 loose bowel movements since yesterday with some blood in the stools that she attributes to her hemorrhoids. She stopped Coumadin couple of days ago since her INR was supratherapeutic. She is from [facility] short term and also mentions that her  and another family member have the flu and she was exposed to them. She did not have the flu shot this season.  In ED, temp was 100.1 rectally, tachycardic ( sinus) , she was saturating to 70s on non rebreather and her SaO2 went up to 95%. ABG showing respiratory alkalosis initially and then corrected after BIPAP placement and correction of RR.  CTA chest showing no PE but showing interval development of multifocal bilateral groundglass opacities as well as new moderate to severe bronchiectasis in the right lung. Findings are concerning for an acute infectious/inflammatory process. (31 Jan 2020 14:36)    MEDICATIONS  (STANDING):  ALBUTerol    90 MICROgram(s) HFA Inhaler 1 Puff(s) Inhalation every 6 hours  calcitriol  Solution 0.25 MICROGram(s) Oral daily  calcium carbonate    500 mG (Tums) Chewable 3 Tablet(s) Chew every 8 hours  caspofungin IVPB 50 milliGRAM(s) IV Intermittent every 24 hours  chlorhexidine 4% Liquid 1 Application(s) Topical <User Schedule>  cyanocobalamin 1000 MICROGram(s) Oral daily  gabapentin 300 milliGRAM(s) Oral at bedtime  influenza   Vaccine 0.5 milliLiter(s) IntraMuscular once  insulin regular Infusion 4 Unit(s)/Hr (4 mL/Hr) IV Continuous <Continuous>  ipratropium 17 MICROgram(s) HFA Inhaler 1 Puff(s) Inhalation every 6 hours  labetalol 100 milliGRAM(s) Oral every 12 hours  lactated ringers. 1000 milliLiter(s) (75 mL/Hr) IV Continuous <Continuous>  methylPREDNISolone sodium succinate Injectable 40 milliGRAM(s) IV Push daily  montelukast 10 milliGRAM(s) Oral at bedtime  multivitamin/minerals 1 Tablet(s) Oral daily  mupirocin 2% Ointment 1 Application(s) Topical two times a day  pantoprazole   Suspension 40 milliGRAM(s) Oral daily  tacrolimus 0.5 milliGRAM(s) Oral every 12 hours    MEDICATIONS  (PRN):  acetaminophen   Tablet .. 650 milliGRAM(s) Oral every 6 hours PRN Temp greater or equal to 38C (100.4F)    INTERVAL EVENTS: Patient seen this am  and son at bedside. Patient responding more to verbal stimuli.    T(C): 36.4 (02-14-20 @ 16:00), Max: 36.8 (02-13-20 @ 20:00)  HR: 88 (02-14-20 @ 17:00) (75 - 108)  BP: 119/50 (02-14-20 @ 17:00) (79/41 - 164/71)  RR: 22 (02-14-20 @ 16:00) (22 - 29)  SpO2: 99% (02-14-20 @ 17:00) (95% - 100%)  Wt(kg): --Vital Signs Last 24 Hrs  T(C): 36.4 (14 Feb 2020 16:00), Max: 36.8 (13 Feb 2020 20:00)  T(F): 97.5 (14 Feb 2020 16:00), Max: 98.3 (13 Feb 2020 20:00)  HR: 88 (14 Feb 2020 17:00) (75 - 108)  BP: 119/50 (14 Feb 2020 17:00) (79/41 - 164/71)  BP(mean): 70 (14 Feb 2020 17:00) (53 - 110)  RR: 22 (14 Feb 2020 16:00) (22 - 29)  SpO2: 99% (14 Feb 2020 17:00) (95% - 100%)    PHYSICAL EXAM:  GENERAL: Remains vented, opens eyes when called by name  NECK: Supple, No JVD  CHEST/LUNG: Coarse BS  HEART: S1, S2  ABDOMEN: Soft, Nontender, Bowel sounds present  EXTREMITIES: ++edema  SKIN: bruises,     LABS:                        7.6    17.13 )-----------( 119      ( 14 Feb 2020 04:50 )             23.0             02-14    151<H>  |  107  |  186<HH>  ----------------------------<  130<H>  3.6   |  27  |  3.2<H>    Ca    7.1<L>      14 Feb 2020 04:50      ABG - ( 14 Feb 2020 13:07 )  pH, Arterial: 7.45  pH, Blood: x     /  pCO2: 38    /  pO2: 62    / HCO3: 27    / Base Excess: 2.6   /  SaO2: 94          Culture - Other (collected 13 Feb 2020 18:55)  Source: .Other wound  Preliminary Report (14 Feb 2020 18:08):    No growth    Culture - Blood (collected 12 Feb 2020 04:50)  Source: .Blood None  Gram Stain (13 Feb 2020 14:24):    Growth in aerobic bottle: Yeast like cells  Preliminary Report (14 Feb 2020 15:27):    Growth in aerobic bottle: Candida albicans    See previous culture [id number]      RADIOLOGY & ADDITIONAL TESTS:

## 2020-02-14 NOTE — PROGRESS NOTE ADULT - ASSESSMENT
73 y/o female with pmhx of asthma, HTN,  autoimmune hepatitis, heterozygous prothrombin gene mutation with hx of PE and DVT on coumadin admitted for SOB     Acute hypoxic resp failure with hypoxia due to influenza and HCAP, Septic shock in immunocompromised patient  - PE? not anticoagulated due to aveolar hemorrhage   - remains intubated, sedation held, vent settings decreased  - remains on IV Solu-medrol  - antibiotics and antiviral discontinued/completed  - continue vent support with PEEP decreased down to 8, Duoneb q6h and q4h PRN  - ID and pulm f/u appreciated    Candedemia  - central line change noted  - cultures still +, continue to collect B/C until negative  - obtain 2D echo  - ophth evaluation noted, no clinical evidence, low suspicion for ocular fungemia/endogenous endophthalmitis   - treatment as per ID    REJI on CKD 3  - intermittent Lasix doses  - off Bicarb  - creatinine trending down  - renal following  - urine output noted  - continue to monitor    Autoimmune hepatitis  - On prednisone 60 mg PO qd and Tacrolimus at home  - now on Solu-medrol  - remains Tacrolimus     HTN  - started on Labetolol    Heterozygous prothrombin gene mutation with hx of PE and DVT on coumadin:  - Coumadin had been held for suspected alveolar hemorrhage     Hx of asthma  - on Montelukast qd    Multiple Skin tears/ wounds  - local care    Diet: tolerating enteral feedings  GI PPx: Protonix 40 mg PO qd  DVT PPx: sequentials  Activity: bedrest  Dispo: readmitted to hospital from  rehab at [facility], ICU for now     Continue supportive measures

## 2020-02-15 NOTE — INPATIENT CERTIFICATION FOR MEDICARE PATIENTS - RISKS OF ADVERSE EVENTS
Concern for cardiopulmonary deterioration Concern for cardiopulmonary deterioration/Concern for worsening infectious process/Concern for renal deterioration

## 2020-02-15 NOTE — PROGRESS NOTE ADULT - SUBJECTIVE AND OBJECTIVE BOX
seen and examined  on high flow o2         PAST HISTORY  --------------------------------------------------------------------------------  No significant changes to PMH, PSH, FHx, SHx, unless otherwise noted    ALLERGIES & MEDICATIONS  --------------------------------------------------------------------------------  Allergies    No Known Allergies    Intolerances      Standing Inpatient Medications  ALBUTerol    90 MICROgram(s) HFA Inhaler 1 Puff(s) Inhalation every 6 hours  calcitriol  Solution 0.25 MICROGram(s) Oral daily  calcium carbonate    500 mG (Tums) Chewable 3 Tablet(s) Chew every 8 hours  caspofungin IVPB 50 milliGRAM(s) IV Intermittent every 24 hours  chlorhexidine 4% Liquid 1 Application(s) Topical <User Schedule>  cyanocobalamin 1000 MICROGram(s) Oral daily  gabapentin 300 milliGRAM(s) Oral at bedtime  influenza   Vaccine 0.5 milliLiter(s) IntraMuscular once  insulin regular Infusion 4 Unit(s)/Hr IV Continuous <Continuous>  ipratropium 17 MICROgram(s) HFA Inhaler 1 Puff(s) Inhalation every 6 hours  labetalol 100 milliGRAM(s) Oral every 12 hours  lactated ringers. 1000 milliLiter(s) IV Continuous <Continuous>  methylPREDNISolone sodium succinate Injectable 40 milliGRAM(s) IV Push daily  montelukast 10 milliGRAM(s) Oral at bedtime  multivitamin/minerals 1 Tablet(s) Oral daily  mupirocin 2% Ointment 1 Application(s) Topical two times a day  pantoprazole   Suspension 40 milliGRAM(s) Oral daily  tacrolimus 0.5 milliGRAM(s) Oral every 12 hours    PRN Inpatient Medications  acetaminophen   Tablet .. 650 milliGRAM(s) Oral every 6 hours PRN        VITALS/PHYSICAL EXAM  --------------------------------------------------------------------------------  T(C): 36 (02-15-20 @ 04:00), Max: 36.4 (02-14-20 @ 16:00)  HR: 106 (02-15-20 @ 06:00) (75 - 108)  BP: 152/77 (02-15-20 @ 06:00) (84/43 - 164/71)  RR: 21 (02-15-20 @ 06:00) (17 - 42)  SpO2: 99% (02-15-20 @ 06:00) (95% - 100%)  Wt(kg): --        02-14-20 @ 07:01  -  02-15-20 @ 07:00  --------------------------------------------------------  IN: 2672 mL / OUT: 2115 mL / NET: 557 mL      Physical Exam:  	Gen: on  high flow o2  	Pulm:  B/L xiomara   	CV: S1S2; no rub  	Abd: +distended  	: lory   	LE: edema      LABS/STUDIES  --------------------------------------------------------------------------------              7.7    24.87 >-----------<  135      [02-15-20 @ 04:30]              23.3     152  |  111  |  162  ----------------------------<  137      [02-15-20 @ 04:30]  4.4   |  25  |  2.5        Ca     7.5     [02-15-20 @ 04:30]      Mg     2.4     [02-15-20 @ 04:30]      Phos  6.0     [02-15-20 @ 04:30]      Creatinine Trend:  SCr 2.5 [02-15 @ 04:30]  SCr 2.6 [02-14 @ 23:30]  SCr 3.2 [02-14 @ 04:50]  SCr 3.9 [02-13 @ 04:50]  SCr 3.9 [02-12 @ 04:50]    Urinalysis - [02-11-20 @ 19:38]      Color Yellow / Appearance Slightly Turbid / SG 1.012 / pH 6.0      Gluc Negative / Ketone Negative  / Bili Negative / Urobili <2 mg/dL       Blood Moderate / Protein 30 mg/dL / Leuk Est Large / Nitrite Negative      RBC 23 /  / Hyaline 9 / Gran  / Sq Epi  / Non Sq Epi 0 / Bacteria Negative      PTH -- (Ca 7.3)      [02-10-20 @ 18:00]   398  HbA1c 6.9      [03-12-18 @ 04:54]  TSH 0.57      [02-01-20 @ 04:46]  Lipid: chol 203, , HDL 74,       [02-01-20 @ 04:46]      Rheumatoid Factor 59      [02-01-20 @ 04:46]  anti-GBM <1.0      [02-01-20 @ 04:46]  Free Light Chains: kappa 4.52, lambda 3.30, ratio = 1.37      [02-01 @ 04:46]  Immunofixation Serum:   No Monoclonal Band Identified    Reference Range: None Detected      [02-01-20 @ 04:46]  SPEP Interpretation: Normal Electrophoresis Pattern      [02-01-20 @ 04:46]

## 2020-02-15 NOTE — SWALLOW BEDSIDE ASSESSMENT ADULT - SLP PERTINENT HISTORY OF CURRENT PROBLEM
pt admitted from [facility] (there for short-term rehab) for worsening SOB hemoptysis and cough. PMHx: asthma, COPD not on home O2, autoimmune hepatitis; recent hospitalization in January 2020 with a diagnosis of pneumonia; pt being treated for acute hypoxic resp failure with hypoxia due to influenza and HCAP, Septic shock in immunocompromised patient; pt intubated 2/1 2' respiratory distress while on bipap. s/p extubation 2/14.

## 2020-02-15 NOTE — PROGRESS NOTE ADULT - SUBJECTIVE AND OBJECTIVE BOX
Hospital Day:  15d    Subjective:    Pt was interviewed and examined at the bedside in the AM. No acute events overnight.   Pt opens eyes but does not follow commands       Past Medical Hx:   Prothrombin gene mutation  Autoimmune hepatitis  Other chronic pulmonary embolism without acute cor pulmonale  Essential hypertension  Autoimmune hepatitis treated with steroids  Asthma  DVT (deep venous thrombosis)    Past Sx:  S/P debridement  History of back surgery  No significant past surgical history    Allergies:  No Known Allergies    Current Meds:   Standng Meds:  ALBUTerol    90 MICROgram(s) HFA Inhaler 1 Puff(s) Inhalation every 6 hours  calcitriol  Solution 0.25 MICROGram(s) Oral daily  calcium carbonate    500 mG (Tums) Chewable 3 Tablet(s) Chew every 8 hours  caspofungin IVPB 50 milliGRAM(s) IV Intermittent every 24 hours  chlorhexidine 4% Liquid 1 Application(s) Topical <User Schedule>  cyanocobalamin 1000 MICROGram(s) Oral daily  dextrose 5%. 1000 milliLiter(s) (75 mL/Hr) IV Continuous <Continuous>  gabapentin 300 milliGRAM(s) Oral at bedtime  influenza   Vaccine 0.5 milliLiter(s) IntraMuscular once  insulin regular Infusion 4 Unit(s)/Hr (4 mL/Hr) IV Continuous <Continuous>  ipratropium 17 MICROgram(s) HFA Inhaler 1 Puff(s) Inhalation every 6 hours  labetalol 100 milliGRAM(s) Oral every 12 hours  methylPREDNISolone sodium succinate Injectable 40 milliGRAM(s) IV Push daily  montelukast 10 milliGRAM(s) Oral at bedtime  multivitamin/minerals 1 Tablet(s) Oral daily  mupirocin 2% Ointment 1 Application(s) Topical two times a day  pantoprazole   Suspension 40 milliGRAM(s) Oral daily  tacrolimus 0.5 milliGRAM(s) Oral every 12 hours    PRN Meds:  acetaminophen   Tablet .. 650 milliGRAM(s) Oral every 6 hours PRN Temp greater or equal to 38C (100.4F)    HOME MEDICATIONS:  acetaminophen 500 mg oral tablet: 2 tab(s) orally every 8 hours  amLODIPine 10 mg oral tablet: 1 tab(s) orally once a day (at bedtime)  budesonide 3 mg oral capsule, extended release: 1 cap(s) orally 2 times a day  ferrous sulfate 325 mg (65 mg elemental iron) oral delayed release tablet: 1 tab(s) orally once a day  furosemide 20 mg oral tablet: 1 tab(s) orally once a day  gabapentin 300 mg oral capsule: 1 cap(s) orally once a day (at bedtime)  ibuprofen 400 mg oral tablet: 1 tab(s) orally every 6 hours, As needed, Moderate Pain (4 - 6)  ipratropium-albuterol 0.5 mg-2.5 mg/3 mLinhalation solution: 3 milliliter(s) inhaled every 6 hours, As Needed  lidocaine 5% topical film: Apply topically to affected area once a day  Metoprolol Tartrate 50 mg oral tablet: 1 tab(s) orally once a day  montelukast 10 mg oral tablet: 1 tab(s) orally once a day (at bedtime)  Multiple Vitamins with Minerals oral tablet: 1 tab(s) orally once a day  oxyCODONE 10 mg oral tablet: 1 tab(s) orally every 6 hours, As needed, Severe Pain (7 - 10)  predniSONE 20 mg oral tablet: 3 tab(s) orally once a day  ProAir HFA 90 mcg/inh inhalation aerosol: 1 puff(s) inhaled every 4 hours, As Needed  risedronate 150 mg oral tablet: 1 tab(s) orally once a month  tacrolimus 0.5 mg oral capsule: 1 cap(s) orally every 12 hours  tiotropium 18 mcg inhalation capsule: 1 cap(s) inhaled once a day, As Needed  Vitamin B12 1000 mcg oral tablet: 1 tab(s) orally once a day  Vitamin C 1000 mg oral tablet: 1 tab(s) orally once a day  warfarin 4 mg oral tablet: 1 tab(s) orally once a day (at bedtime)      Vital Signs:   T(F): 96 (02-15-20 @ 10:00), Max: 97.5 (20 @ 16:00)  HR: 86 (02-15-20 @ 10:00) (76 - 108)  BP: 118/59 (02-15-20 @ 10:00) (84/43 - 164/71)  RR: 23 (02-15-20 @ 10:00) (17 - 42)  SpO2: 96% (02-15-20 @ 10:00) (95% - 100%)      20 @ 07:01  -  02-15-20 @ 07:00  --------------------------------------------------------  IN: 2672 mL / OUT: 2225 mL / NET: 447 mL    02-15-20 @ 07:01  -  02-15-20 @ 11:13  --------------------------------------------------------  IN: 164 mL / OUT: 220 mL / NET: -56 mL        Physical Exam:   CONSTITUTIONAL: NAD, lethargic   HEAD: Normocephalic; atraumatic, NG+ on high flow   EYES: PERRL, EOMI, no conjunctival erythema  CARD: +S1, S2 Regular rate and rhythm, tachy  RESP: b/l ronchi  ABD: soft nontender, distended, no rebound, no guarding, no rigidity,   EXT: edema on limbs, wounds on b/l arms   NEURO: opens eyes, does not follows commands, to weak to move limbs   Lines: L IJ       Labs:                         7.7    24.87 )-----------( 135      ( 15 Feb 2020 04:30 )             23.3     Neutophil% 94.1, Lymphocyte% 2.1, Monocyte% 1.8, Bands% 1.8 02-15-20 @ 04:30    15 Feb 2020 04:30    152    |  111    |  162    ----------------------------<  137    4.4     |  25     |  2.5      Ca    7.5        15 Feb 2020 04:30  Phos  6.0       15 Feb 2020 04:30  Mg     2.4       15 Feb 2020 04:30              Serum Pro-Brain Natriuretic Peptide: 7750 pg/mL (20 @ 11:04)          Urinalysis Basic - ( 2020 19:38 )    Color: Yellow / Appearance: Slightly Turbid / S.012 / pH: x  Gluc: x / Ketone: Negative  / Bili: Negative / Urobili: <2 mg/dL   Blood: x / Protein: 30 mg/dL / Nitrite: Negative   Leuk Esterase: Large / RBC: 23 /HPF /  /HPF   Sq Epi: x / Non Sq Epi: 0 /HPF / Bacteria: Negative          Culture - Blood (collected 20 @ 04:50)  Source: .Blood None  Gram Stain (20 @ 14:24):    Growth in aerobic bottle: Yeast like cells  Preliminary Report (20 @ 15:27):    Growth in aerobic bottle: Candida albicans    See previous culture [id number]    Culture - Blood (collected 20 @ 11:21)  Source: .Blood None  Gram Stain (20 @ 16:48):    Growth in aerobic bottle: Yeast like cells  Preliminary Report (20 @ 07:30):    Growth in aerobic bottle: Candida albicans    ***Blood Panel PCR results on this specimen are available    approximately 3 hours after the Gram stain result.***    Gram stain, PCR, and/or culture results may not always    correspond due to difference in methodologies.    ************************************************************    This PCR assay was performed using [employer].    The following targets are tested for: Enterococcus,    vancomycin resistant enterococci, Listeria monocytogenes,    coagulase negative staphylococci, S. aureus,    methicillin resistant S. aureus, Streptococcus agalactiae    (Group B), S. pneumoniae, S. pyogenes (Group A),    Acinetobacter baumannii, Enterobacter cloacae, E. coli,    Klebsiella oxytoca, K. pneumoniae, Proteus sp.,    Serratia marcescens, Haemophilus influenzae,    Neisseria meningitidis, Pseudomonas aeruginosa, Candida    albicans, C. glabrata, C krusei, C parapsilosis,    C. tropicalis and the KPC resistance gene.  Organism: Blood Culture PCR (20 @ 18:21)  Organism: Blood Culture PCR (20 @ 18:21)      -  Candida albicans: Detec      Method Type: PCR        Radiology:

## 2020-02-15 NOTE — PROGRESS NOTE ADULT - SUBJECTIVE AND OBJECTIVE BOX
ELDER, DONI  75y  Female  HPI:  73y/o F w/ hx of asthma, COPD not on home O2, autoimmune hepatitis on prednisone and tacrolimus, heterozygous prothrombin gene mutation with hx of PE and DVT on coumadin with recent hospitalization in January 2020 with a diagnosis of pneumonia presents for worsening SOB, hemoptysis and cough. Everything started yesterday night when patient was in bed, started to feel shortness of breath and had many episodes of hemoptysis with clots, she also had substernal chest pain non radiating, moderate in intensity that worsens on coughing. She denies fever but endorses chills. She also admits for lightheadedness that occurred yesterday, no HA, abd pain, dysuria or paresthesias. She had 2 loose bowel movements since yesterday with some blood in the stools that she attributes to her hemorrhoids. She stopped Coumadin couple of days ago since her INR was supratherapeutic. She is from [facility] short term and also mentions that her  and another family member have the flu and she was exposed to them. She did not have the flu shot this season.  In ED, temp was 100.1 rectally, tachycardic ( sinus) , she was saturating to 70s on non rebreather and her SaO2 went up to 95%. ABG showing respiratory alkalosis initially and then corrected after BIPAP placement and correction of RR.  CTA chest showing no PE but showing interval development of multifocal bilateral groundglass opacities as well as new moderate to severe bronchiectasis in the right lung. Findings are concerning for an acute infectious/inflammatory process. (31 Jan 2020 14:36)    MEDICATIONS  (STANDING):  ALBUTerol    90 MICROgram(s) HFA Inhaler 1 Puff(s) Inhalation every 6 hours  calcitriol  Solution 0.25 MICROGram(s) Oral daily  calcium carbonate    500 mG (Tums) Chewable 3 Tablet(s) Chew every 8 hours  caspofungin IVPB 50 milliGRAM(s) IV Intermittent every 24 hours  chlorhexidine 4% Liquid 1 Application(s) Topical <User Schedule>  cyanocobalamin 1000 MICROGram(s) Oral daily  gabapentin 300 milliGRAM(s) Oral at bedtime  influenza   Vaccine 0.5 milliLiter(s) IntraMuscular once  insulin regular Infusion 4 Unit(s)/Hr (4 mL/Hr) IV Continuous <Continuous>  ipratropium 17 MICROgram(s) HFA Inhaler 1 Puff(s) Inhalation every 6 hours  labetalol 100 milliGRAM(s) Oral every 12 hours  methylPREDNISolone sodium succinate Injectable 40 milliGRAM(s) IV Push daily  montelukast 10 milliGRAM(s) Oral at bedtime  multivitamin/minerals 1 Tablet(s) Oral daily  mupirocin 2% Ointment 1 Application(s) Topical two times a day  pantoprazole   Suspension 40 milliGRAM(s) Oral daily  tacrolimus 0.5 milliGRAM(s) Oral every 12 hours    MEDICATIONS  (PRN):  acetaminophen   Tablet .. 650 milliGRAM(s) Oral every 6 hours PRN Temp greater or equal to 38C (100.4F)    INTERVAL EVENTS: Patient seen earlier today, weaned from vent on Hi-flow O2. Patient opens eyes, does not track movement    T(C): 36 (02-15-20 @ 16:00), Max: 36.1 (02-14-20 @ 20:00)  HR: 76 (02-15-20 @ 19:00) (74 - 106)  BP: 110/52 (02-15-20 @ 19:00) (102/55 - 162/78)  RR: 26 (02-15-20 @ 19:00) (17 - 43)  SpO2: 97% (02-15-20 @ 19:00) (96% - 100%)  Wt(kg): --Vital Signs Last 24 Hrs  T(C): 36 (15 Feb 2020 16:00), Max: 36.1 (14 Feb 2020 20:00)  T(F): 96.8 (15 Feb 2020 16:00), Max: 97 (15 Feb 2020 12:00)  HR: 76 (15 Feb 2020 19:00) (74 - 106)  BP: 110/52 (15 Feb 2020 19:00) (102/55 - 162/78)  BP(mean): 77 (15 Feb 2020 19:00) (68 - 125)  RR: 26 (15 Feb 2020 19:00) (17 - 43)  SpO2: 97% (15 Feb 2020 19:00) (96% - 100%)    PHYSICAL EXAM:  GENERAL: NAD, on hi-flow O2  NECK: Supple, left TLC  CHEST/LUNG: Coarse BS, + wheezing  HEART: S1, S2, Regular rate and rhythm  ABDOMEN: Soft, Nontender, Bowel sounds present  EXTREMITIES: + weeping edema  SKIN: bruising, thin skin, open areas    LABS:  Labs:                        6.8    22.70 )-----------( 127      ( 15 Feb 2020 18:30 )             21.2             02-15    147<H>  |  106  |  147<HH>  ----------------------------<  198<H>  3.8   |  25  |  2.2<H>    Ca    7.3<L>      15 Feb 2020 17:11  Phos  6.0     02-15  Mg     2.4     02-15                ABG - ( 14 Feb 2020 13:07 )  pH, Arterial: 7.45  pH, Blood: x     /  pCO2: 38    /  pO2: 62    / HCO3: 27    / Base Excess: 2.6   /  SaO2: 94            Culture - Sputum (collected 14 Feb 2020 17:00)  Source: .Sputum Sputum  Gram Stain (15 Feb 2020 04:56):    Few Squamous epithelial cells per low power field    Few polymorphonuclear leukocytes per low power field    Few Yeast like cells per oil power field    Few Gram variable coccobacilli per oil power field  Preliminary Report (15 Feb 2020 18:43):    Moderate Staphylococcus aureus    Normal Respiratory Nikki present    Culture - Blood (collected 14 Feb 2020 04:30)  Source: .Blood Blood-Peripheral  Preliminary Report (15 Feb 2020 14:01):    No growth to date.    Culture - Other (collected 13 Feb 2020 18:55)  Source: .Other wound  Final Report (15 Feb 2020 19:22):    No growth      RADIOLOGY & ADDITIONAL TESTS: ELDER, DONI  75y  Female  HPI:  75y/o F w/ hx of asthma, COPD not on home O2, autoimmune hepatitis on prednisone and tacrolimus, heterozygous prothrombin gene mutation with hx of PE and DVT on coumadin with recent hospitalization in January 2020 with a diagnosis of pneumonia presents for worsening SOB, hemoptysis and cough. Everything started yesterday night when patient was in bed, started to feel shortness of breath and had many episodes of hemoptysis with clots, she also had substernal chest pain non radiating, moderate in intensity that worsens on coughing. She denies fever but endorses chills. She also admits for lightheadedness that occurred yesterday, no HA, abd pain, dysuria or paresthesias. She had 2 loose bowel movements since yesterday with some blood in the stools that she attributes to her hemorrhoids. She stopped Coumadin couple of days ago since her INR was supratherapeutic. She is from [facility] short term and also mentions that her  and another family member have the flu and she was exposed to them. She did not have the flu shot this season.  In ED, temp was 100.1 rectally, tachycardic ( sinus) , she was saturating to 70s on non rebreather and her SaO2 went up to 95%. ABG showing respiratory alkalosis initially and then corrected after BIPAP placement and correction of RR.  CTA chest showing no PE but showing interval development of multifocal bilateral groundglass opacities as well as new moderate to severe bronchiectasis in the right lung. Findings are concerning for an acute infectious/inflammatory process. (31 Jan 2020 14:36)    MEDICATIONS  (STANDING):  ALBUTerol    90 MICROgram(s) HFA Inhaler 1 Puff(s) Inhalation every 6 hours  calcitriol  Solution 0.25 MICROGram(s) Oral daily  calcium carbonate    500 mG (Tums) Chewable 3 Tablet(s) Chew every 8 hours  caspofungin IVPB 50 milliGRAM(s) IV Intermittent every 24 hours  chlorhexidine 4% Liquid 1 Application(s) Topical <User Schedule>  cyanocobalamin 1000 MICROGram(s) Oral daily  gabapentin 300 milliGRAM(s) Oral at bedtime  influenza   Vaccine 0.5 milliLiter(s) IntraMuscular once  insulin regular Infusion 4 Unit(s)/Hr (4 mL/Hr) IV Continuous <Continuous>  ipratropium 17 MICROgram(s) HFA Inhaler 1 Puff(s) Inhalation every 6 hours  labetalol 100 milliGRAM(s) Oral every 12 hours  methylPREDNISolone sodium succinate Injectable 40 milliGRAM(s) IV Push daily  montelukast 10 milliGRAM(s) Oral at bedtime  multivitamin/minerals 1 Tablet(s) Oral daily  mupirocin 2% Ointment 1 Application(s) Topical two times a day  pantoprazole   Suspension 40 milliGRAM(s) Oral daily  tacrolimus 0.5 milliGRAM(s) Oral every 12 hours    MEDICATIONS  (PRN):  acetaminophen   Tablet .. 650 milliGRAM(s) Oral every 6 hours PRN Temp greater or equal to 38C (100.4F)    INTERVAL EVENTS: Patient seen earlier today, weaned from vent on Hi-flow O2. Patient opens eyes, does not track movement    T(C): 36 (02-15-20 @ 16:00), Max: 36.1 (02-14-20 @ 20:00)  HR: 76 (02-15-20 @ 19:00) (74 - 106)  BP: 110/52 (02-15-20 @ 19:00) (102/55 - 162/78)  RR: 26 (02-15-20 @ 19:00) (17 - 43)  SpO2: 97% (02-15-20 @ 19:00) (96% - 100%)  Wt(kg): --Vital Signs Last 24 Hrs  T(C): 36 (15 Feb 2020 16:00), Max: 36.1 (14 Feb 2020 20:00)  T(F): 96.8 (15 Feb 2020 16:00), Max: 97 (15 Feb 2020 12:00)  HR: 76 (15 Feb 2020 19:00) (74 - 106)  BP: 110/52 (15 Feb 2020 19:00) (102/55 - 162/78)  BP(mean): 77 (15 Feb 2020 19:00) (68 - 125)  RR: 26 (15 Feb 2020 19:00) (17 - 43)  SpO2: 97% (15 Feb 2020 19:00) (96% - 100%)    PHYSICAL EXAM:  GENERAL: NAD, on hi-flow O2  NECK: Supple, left TLC  CHEST/LUNG: Coarse BS, + wheezing  HEART: S1, S2, Regular rate and rhythm  ABDOMEN: Soft, Nontender, Bowel sounds present  EXTREMITIES: + weeping edema  SKIN: bruising, thin skin, open areas    LABS:  Labs:                        6.8    22.70 )-----------( 127      ( 15 Feb 2020 18:30 )             21.2             02-15    147<H>  |  106  |  147<HH>  ----------------------------<  198<H>  3.8   |  25  |  2.2<H>    Ca    7.3<L>      15 Feb 2020 17:11  Phos  6.0     02-15  Mg     2.4     02-15                ABG - ( 14 Feb 2020 13:07 )  pH, Arterial: 7.45  pH, Blood: x     /  pCO2: 38    /  pO2: 62    / HCO3: 27    / Base Excess: 2.6   /  SaO2: 94            Culture - Sputum (collected 14 Feb 2020 17:00)  Source: .Sputum Sputum  Gram Stain (15 Feb 2020 04:56):    Few Squamous epithelial cells per low power field    Few polymorphonuclear leukocytes per low power field    Few Yeast like cells per oil power field    Few Gram variable coccobacilli per oil power field  Preliminary Report (15 Feb 2020 18:43):    Moderate Staphylococcus aureus    Normal Respiratory Nikki present    Culture - Blood (collected 14 Feb 2020 04:30)  Source: .Blood Blood-Peripheral  Preliminary Report (15 Feb 2020 14:01):    No growth to date.    Culture - Other (collected 13 Feb 2020 18:55)  Source: .Other wound  Final Report (15 Feb 2020 19:22):    No growth      RADIOLOGY & ADDITIONAL TESTS:  < from: Transthoracic Echocardiogram (02.15.20 @ 09:26) >  ummary:   1. Normal global left ventricular systolic function.   2. LV Ejection Fraction by Caballero's Method with a biplane EF of 62 %.   3. Elevated left atrial and left ventricular end-diastolic pressures.   4. Mild concentric left ventricular hypertrophy.   5. Mildly increased LV wall thickness.   6. Normal left ventricular internal cavity size.   7. Spectral Doppler shows impaired relaxation pattern of left ventricular myocardial filling (Grade I diastolic dysfunction).   8. The mean global longitudinal peak strain by speckle tracking is -15.1% which is reduced.   9. Estimated pulmonary artery systolic pressure is 41.8 mmHg assuming a right atrial pressure of 8 mmHg, which is consistent with mild pulmonary hypertension.  10. Pulmonary hypertension is present.  11. LA volume Index is 20.5 ml/m² ml/m2.    PHYSICIAN INTERPRETATION:  Left Ventricle: The left ventricular internal cavity size is normal. Left ventricular wall thickness is mildly increased. There is mild concentric left ventricular hypertrophy.Global LV systolic function was normal. Spectral Doppler shows impaired relaxation pattern of left ventricular myocardial filling (Grade I diastolic dysfunction). Elevated left atrial and left ventricular end-diastolic pressures. The mean global longitudinal peak strain by speckle tracking is -15.1% which is reduced.       LV Wall Scoring:  All segments are normal.    Right Ventricle: Normal right ventricular size and function.  Left Atrium: Normal left atrial size. LA volume Index is 20.5 ml/m² ml/m2.  Right Atrium: Normal right atrial size.  Pericardium: There is no evidence of pericardial effusion.  Mitral Valve: Structurally normal mitral valve, with normal leaflet excursion. The mitral valve is normal in structure. No evidence of mitral valve regurgitation is seen.  Tricuspid Valve: Structurally normal tricuspid valve, with normal leaflet excursion. The tricuspid valve is normal in structure. Mild tricuspid regurgitation is visualized. Estimated pulmonary artery systolic pressure is 41.8 mmHg assuming a right atrial pressure of 8 mmHg, which is consistent with mild pulmonary hypertension.  Aortic Valve: Normal trileaflet aortic valve with normal opening. The aortic valve is normal. No evidence of aortic valve regurgitation is seen.  Pulmonic Valve: Structurally normal pulmonic valve, with normal leaflet excursion. The pulmonic valve is normal. No indication of pulmonic valve regurgitation.  Aorta: The aortic root and ascending aorta are structurally normal, with no evidence of dilitation.  Pulmonary Artery: The main pulmonary artery is normal in size. Pulmonary hypertension is present.  Venous: The inferior vena cava was normal sized, with respiratory size variation less than 50%.    < end of copied text >

## 2020-02-15 NOTE — PROGRESS NOTE ADULT - SUBJECTIVE AND OBJECTIVE BOX
Patient is a 75y old  Female who presents with a chief complaint of SOB and desaturation (15 Feb 2020 07:04)        Over Night Events:        ROS:     CONSTITUTIONAL:   no fever   no chills.  no weight gain   no weight loss    EYES:   no discharge,   no pain  no redness,   no visual changes.    ENT:   Ears: no ear pain and no hearing problems.  Nose: no nasal congestion and no nasal drainage.  Mouth/Throat: no dysphagia,  no hoarseness and no throat pain.  Neck: no lumps, no pain, no stiffness and no swollen glands.     CARDIOVASCULAR:   no chest pain,   no swelling  no palpitaions  no syncope    RESPIRATORY:  no SOB,  no wheezing ,  no respiratory difficulty  no sputum production    GASTROINTESTINAL:   no abdominal pain,   no constipation,   no diarrhea,   no vomiting.    GENITOURINARY:  no dysuria,   no frequency,   no urgency  no hematuria.    MUSCULOSKELETAL:   no back pain,   no musculoskeletal pain,  no weakness.    SKIN:   no jaundice,   no lesions,   no pruritis,   no rashes.    NEURO:   no loss of consciousness,   no gait abnormality,   no headache,   no sensory deficits,   no weakness.    PSYCHIATRIC:   no known mental health issues  no anxiety  no depression    ALLERGIC/IMMUNOLOGIC:   No active allergic or immunologic issues        PHYSICAL EXAM    ICU Vital Signs Last 24 Hrs  T(C): 35.4 (15 Feb 2020 08:00), Max: 36.4 (14 Feb 2020 16:00)  T(F): 95.8 (15 Feb 2020 08:00), Max: 97.5 (14 Feb 2020 16:00)  HR: 88 (15 Feb 2020 09:00) (76 - 108)  BP: 121/62 (15 Feb 2020 09:00) (84/43 - 164/71)  BP(mean): 85 (15 Feb 2020 09:00) (56 - 125)  ABP: --  ABP(mean): --  RR: 22 (15 Feb 2020 09:00) (17 - 42)  SpO2: 96% (15 Feb 2020 09:00) (95% - 100%)      CONSTITUTIONAL:   Ill appearing.  Well nourished.  NAD    ENT:   Airway patent,   Mouth with normal mucosa.   No thrush    CARDIAC:   Normal rate,   Regular rhythm.     No edema      RESPIRATORY:   NO wheezing  Bilateral BS  Normal chest expansion  Not tachypneic,  No use of accessory muscles    GASTROINTESTINAL:  Abdomen soft,   Non-tender,   No guarding,   + BS    MUSCULOSKELETAL:   Range of motion is not limited,  No clubbing, cyanosis    NEUROLOGICAL:   Alert and oriented   No motor  deficits.  pertinent DTRs normal    SKIN:   Skin normal color for race,   warm, dry   No evidence of rash.        02-14-20 @ 07:01  -  02-15-20 @ 07:00  --------------------------------------------------------  IN:    Enteral Tube Flush: 460 mL    insulin regular Infusion: 102 mL    IV PiggyBack: 250 mL    lactated ringers.: 1650 mL    Nepro with Carb Steady: 210 mL  Total IN: 2672 mL    OUT:    Indwelling Catheter - Urethral: 2175 mL    Rectal Tube: 50 mL  Total OUT: 2225 mL    Total NET: 447 mL      02-15-20 @ 07:01  -  02-15-20 @ 09:52  --------------------------------------------------------  IN:    insulin regular Infusion: 14 mL    lactated ringers.: 150 mL  Total IN: 164 mL    OUT:    Indwelling Catheter - Urethral: 120 mL  Total OUT: 120 mL    Total NET: 44 mL          LABS:                            7.7    24.87 )-----------( 135      ( 15 Feb 2020 04:30 )             23.3                                               02-15    152<H>  |  111<H>  |  162<HH>  ----------------------------<  137<H>  4.4   |  25  |  2.5<H>    Ca    7.5<L>      15 Feb 2020 04:30  Phos  6.0     02-15  Mg     2.4     02-15                                                                                                                                      Culture - Sputum (collected 14 Feb 2020 17:00)  Source: .Sputum Sputum  Gram Stain (15 Feb 2020 04:56):    Few Squamous epithelial cells per low power field    Few polymorphonuclear leukocytes per low power field    Few Yeast like cells per oil power field    Few Gram variable coccobacilli per oil power field    Culture - Other (collected 13 Feb 2020 18:55)  Source: .Other wound  Preliminary Report (14 Feb 2020 18:08):    No growth                                                                                       ABG - ( 14 Feb 2020 13:07 )  pH, Arterial: 7.45  pH, Blood: x     /  pCO2: 38    /  pO2: 62    / HCO3: 27    / Base Excess: 2.6   /  SaO2: 94                  MEDICATIONS  (STANDING):  ALBUTerol    90 MICROgram(s) HFA Inhaler 1 Puff(s) Inhalation every 6 hours  calcitriol  Solution 0.25 MICROGram(s) Oral daily  calcium carbonate    500 mG (Tums) Chewable 3 Tablet(s) Chew every 8 hours  caspofungin IVPB 50 milliGRAM(s) IV Intermittent every 24 hours  chlorhexidine 4% Liquid 1 Application(s) Topical <User Schedule>  cyanocobalamin 1000 MICROGram(s) Oral daily  gabapentin 300 milliGRAM(s) Oral at bedtime  influenza   Vaccine 0.5 milliLiter(s) IntraMuscular once  insulin regular Infusion 4 Unit(s)/Hr (4 mL/Hr) IV Continuous <Continuous>  ipratropium 17 MICROgram(s) HFA Inhaler 1 Puff(s) Inhalation every 6 hours  labetalol 100 milliGRAM(s) Oral every 12 hours  lactated ringers. 1000 milliLiter(s) (100 mL/Hr) IV Continuous <Continuous>  methylPREDNISolone sodium succinate Injectable 40 milliGRAM(s) IV Push daily  montelukast 10 milliGRAM(s) Oral at bedtime  multivitamin/minerals 1 Tablet(s) Oral daily  mupirocin 2% Ointment 1 Application(s) Topical two times a day  pantoprazole   Suspension 40 milliGRAM(s) Oral daily  tacrolimus 0.5 milliGRAM(s) Oral every 12 hours    MEDICATIONS  (PRN):  acetaminophen   Tablet .. 650 milliGRAM(s) Oral every 6 hours PRN Temp greater or equal to 38C (100.4F)      New X-rays reviewed:                                                                                  ECHO    CXR interpreted by me: Patient is a 75y old  Female who presents with a chief complaint of SOB and desaturation (15 Feb 2020 07:04)        Over Night Events:  Extubated yesterday.  Off pressors.  Slight improvement in MS        ROS:     CONSTITUTIONAL:   no fever   no chills.  no weight gain   no weight loss    EYES:   no discharge,   no pain  no redness,   no visual changes.    ENT:   Ears: no ear pain and no hearing problems.  Nose: no nasal congestion and no nasal drainage.  Mouth/Throat: no dysphagia,  no hoarseness and no throat pain.  Neck: no lumps, no pain, no stiffness and no swollen glands.     CARDIOVASCULAR:   no chest pain,   no swelling  no palpitaions  no syncope    RESPIRATORY:  no SOB,  no wheezing ,  no respiratory difficulty  no sputum production    GASTROINTESTINAL:   no abdominal pain,   no constipation,   no diarrhea,   no vomiting.    GENITOURINARY:  no dysuria,   no frequency,   no urgency  no hematuria.    MUSCULOSKELETAL:   no back pain,   no musculoskeletal pain,  no weakness.    SKIN:   no jaundice,   no lesions,   no pruritis,   no rashes.    NEURO:   Per HPI     PSYCHIATRIC:   no known mental health issues  no anxiety  no depression    ALLERGIC/IMMUNOLOGIC:   No active allergic or immunologic issues        PHYSICAL EXAM    ICU Vital Signs Last 24 Hrs  T(C): 35.4 (15 Feb 2020 08:00), Max: 36.4 (14 Feb 2020 16:00)  T(F): 95.8 (15 Feb 2020 08:00), Max: 97.5 (14 Feb 2020 16:00)  HR: 88 (15 Feb 2020 09:00) (76 - 108)  BP: 121/62 (15 Feb 2020 09:00) (84/43 - 164/71)  BP(mean): 85 (15 Feb 2020 09:00) (56 - 125)  ABP: --  ABP(mean): --  RR: 22 (15 Feb 2020 09:00) (17 - 42)  SpO2: 96% (15 Feb 2020 09:00) (95% - 100%)      CONSTITUTIONAL:   Ill appearing.  Well nourished.  NAD    ENT:   Airway patent,   Mouth with normal mucosa.   No thrush    CARDIAC:   Normal rate,   Regular rhythm.     No edema      RESPIRATORY:   NO wheezing  Bilateral BS  Normal chest expansion  Not tachypneic,  No use of accessory muscles    GASTROINTESTINAL:  Abdomen soft,   Non-tender,   No guarding,   + BS    MUSCULOSKELETAL:   Range of motion is not limited,  No clubbing, cyanosis    NEUROLOGICAL:   More alert.  Does not follow commands  Withdraws to pain weakly     SKIN:   Skin normal color for race,   warm, dry   No evidence of rash.        02-14-20 @ 07:01  -  02-15-20 @ 07:00  --------------------------------------------------------  IN:    Enteral Tube Flush: 460 mL    insulin regular Infusion: 102 mL    IV PiggyBack: 250 mL    lactated ringers.: 1650 mL    Nepro with Carb Steady: 210 mL  Total IN: 2672 mL    OUT:    Indwelling Catheter - Urethral: 2175 mL    Rectal Tube: 50 mL  Total OUT: 2225 mL    Total NET: 447 mL      02-15-20 @ 07:01  -  02-15-20 @ 09:52  --------------------------------------------------------  IN:    insulin regular Infusion: 14 mL    lactated ringers.: 150 mL  Total IN: 164 mL    OUT:    Indwelling Catheter - Urethral: 120 mL  Total OUT: 120 mL    Total NET: 44 mL          LABS:                            7.7    24.87 )-----------( 135      ( 15 Feb 2020 04:30 )             23.3                                               02-15    152<H>  |  111<H>  |  162<HH>  ----------------------------<  137<H>  4.4   |  25  |  2.5<H>    Ca    7.5<L>      15 Feb 2020 04:30  Phos  6.0     02-15  Mg     2.4     02-15                                                                                                                                      Culture - Sputum (collected 14 Feb 2020 17:00)  Source: .Sputum Sputum  Gram Stain (15 Feb 2020 04:56):    Few Squamous epithelial cells per low power field    Few polymorphonuclear leukocytes per low power field    Few Yeast like cells per oil power field    Few Gram variable coccobacilli per oil power field    Culture - Other (collected 13 Feb 2020 18:55)  Source: .Other wound  Preliminary Report (14 Feb 2020 18:08):    No growth                                                                                       ABG - ( 14 Feb 2020 13:07 )  pH, Arterial: 7.45  pH, Blood: x     /  pCO2: 38    /  pO2: 62    / HCO3: 27    / Base Excess: 2.6   /  SaO2: 94                  MEDICATIONS  (STANDING):  ALBUTerol    90 MICROgram(s) HFA Inhaler 1 Puff(s) Inhalation every 6 hours  calcitriol  Solution 0.25 MICROGram(s) Oral daily  calcium carbonate    500 mG (Tums) Chewable 3 Tablet(s) Chew every 8 hours  caspofungin IVPB 50 milliGRAM(s) IV Intermittent every 24 hours  chlorhexidine 4% Liquid 1 Application(s) Topical <User Schedule>  cyanocobalamin 1000 MICROGram(s) Oral daily  gabapentin 300 milliGRAM(s) Oral at bedtime  influenza   Vaccine 0.5 milliLiter(s) IntraMuscular once  insulin regular Infusion 4 Unit(s)/Hr (4 mL/Hr) IV Continuous <Continuous>  ipratropium 17 MICROgram(s) HFA Inhaler 1 Puff(s) Inhalation every 6 hours  labetalol 100 milliGRAM(s) Oral every 12 hours  lactated ringers. 1000 milliLiter(s) (100 mL/Hr) IV Continuous <Continuous>  methylPREDNISolone sodium succinate Injectable 40 milliGRAM(s) IV Push daily  montelukast 10 milliGRAM(s) Oral at bedtime  multivitamin/minerals 1 Tablet(s) Oral daily  mupirocin 2% Ointment 1 Application(s) Topical two times a day  pantoprazole   Suspension 40 milliGRAM(s) Oral daily  tacrolimus 0.5 milliGRAM(s) Oral every 12 hours    MEDICATIONS  (PRN):  acetaminophen   Tablet .. 650 milliGRAM(s) Oral every 6 hours PRN Temp greater or equal to 38C (100.4F)      New X-rays reviewed:                                                                                  ECHO    CXR interpreted by me:

## 2020-02-15 NOTE — PROGRESS NOTE ADULT - ASSESSMENT
IMPRESSION:    Acute hypoxic respiratory failure resolved  DAH resolved   Influenza A treated   ARDS  Candidemia   REJI improving   HO Autoimmune hepatitis on tacrolimus and chronic steroids   h/o hypercoagulable state/ vte was on coumadin   Left great saphenous vein thrombosis chronic    PLAN:    CNS:  Keep off sedation.  FU MS.      HEENT: ET care.  Oral care.  SP opthalmology evaluation     PULMONARY:  HOB @ 45 degrees. Solumedrol 40 mg qd for now. Wean O2. Aspiration precautions    CARDIOVASCULAR:  I=O.   cc pr hour    GI: GI prophylaxis.  NG feeding    RENAL:  Follow up lytes. Replete as needed.  FU with Renal.      INFECTIOUS DISEASE: Follow up cultures.  Continue Anti fungal. FU with ID     HEMATOLOGICAL:  DVT prophylaxis.  fu cbc     ENDOCRINE:  Follow up FS.  Insulin protocol if needed.    MUSCULOSKELETAL: Bed rest     Hogan for retention     Code status: full     Prognosis: Poor prognosis     Continue MICU monitoring for now. IMPRESSION:    Acute hypoxic respiratory failure resolved  DAH resolved   Influenza A treated   ARDS  Candidemia   REJI improving   HO Autoimmune hepatitis on tacrolimus and chronic steroids   h/o hypercoagulable state/ vte was on coumadin   Left great saphenous vein thrombosis chronic    PLAN:    CNS:  Keep off sedation.  FU MSAndrae      HEENT: ET care.  Oral care.  SP opthalmology evaluation     PULMONARY:  HOB @ 45 degrees. Solumedrol 40 mg qd for now. Wean O2. Aspiration precautions    CARDIOVASCULAR:  I=O.  D5 w 75 cc per hour     GI: GI prophylaxis.  NG feeding    RENAL:  Follow up lytes. Replete as needed.  FU with Renal.  FU lytes 6 pm    INFECTIOUS DISEASE: Follow up cultures.  Continue Anti fungal. FU with ID     HEMATOLOGICAL:  DVT prophylaxis.  fu cbc     ENDOCRINE:  Follow up FS.  Insulin protocol if needed.    MUSCULOSKELETAL: Bed rest     Hogan for retention     Code status: full     Prognosis: Poor prognosis     Continue MICU monitoring for now.

## 2020-02-15 NOTE — PROGRESS NOTE ADULT - ASSESSMENT
75 y/o female with pmhx of asthma, HTN,  autoimmune hepatitis, heterozygous prothrombin gene mutation with hx of PE and DVT on coumadin admitted for SOB     Acute hypoxic resp failure with hypoxia due to influenza and HCAP, Septic shock in immunocompromised patient  - PE? not anticoagulated due to aveolar hemorrhage   - weaned from vent => Hi flow NC  - remains on IV Solu-medrol  - antibiotics and antiviral discontinued/completed  - continue vent support with PEEP decreased down to 8, Duoneb q6h and q4h PRN  - ID and pulm f/u appreciated    Candedemia  - central line change noted  - cultures negative x2  - 2D echo no evidence of vegatation  - ophth evaluation noted, no clinical evidence, low suspicion for ocular fungemia/endogenous endophthalmitis   - treatment as per ID    REJI on CKD 3  - intermittent Lasix doses  - off Bicarb  - creatinine trending down  - renal following  - urine output noted  - continue to monitor    Autoimmune hepatitis  - On prednisone 60 mg PO qd and Tacrolimus at home  - now on Solu-medrol  - remains Tacrolimus     HTN  - started on Labetolol    Heterozygous prothrombin gene mutation with hx of PE and DVT on coumadin:  - Coumadin had been held for suspected alveolar hemorrhage     Hx of asthma  - on Montelukast qd    Multiple Skin tears/ wounds  - local care    Diet: tolerating enteral feedings  GI PPx: Protonix 40 mg PO qd  DVT PPx: sequentials  Activity: bedrest  Dispo: readmitted to hospital from  rehab at [facility], ICU for now     Continue supportive measures

## 2020-02-15 NOTE — PROGRESS NOTE ADULT - ASSESSMENT
75 y/o F with REJI in hospital for SOB, hemoptysis, cough.  PMH asthma, COPD not on home O2, autoimmune hepatitis on prednisone and tacrolimus, heterozygous prothrombin gene mutation with hx of PE and DVT on coumadin, recent hospitalization for pneumonia   # REJI/ ATN in nature   # creatinine trending down   # non oliguric  # d/c iv fluids   #  need to hold tacrolimus  in view of infection , if ok with GI   # corrected calcium around 8.2, on Ca carbonate, increase to 3 tablets q 8h  PTH noted , on calcitriol 0.25 mcg q24, , ph improving   # wbc noted, on steroids followed by id , remains  on antifungal   # no acute indication for RRT  # will follow  # prognosis guarded

## 2020-02-15 NOTE — PROGRESS NOTE ADULT - ASSESSMENT
74y female with PMH of asthma, COPD not on home O2, autoimmune hepatitis on prednisone and tacrolimus, heterozygous prothrombin gene mutation with hx of PE and DVT on coumadin presents to the hospital complaining of cough, hemoptysis, SOB and desaturation to 70s. Pt was found to be flu+ likely viral PNA complicated by diffuse alveolar hemorrhage and ARDS . Hospital course complicated by DVT in L saphenous vein with possible DVT and REJI likely secondary to ATN due to vanco.      #ARDS  1-alveolar hemorrhage   2-flu +ve with post viral pneumonia resolved   3-possibel PE: cant start AC due to alveolar hemorrhage    - s/p DDAVP 3 doses   - Solumedrol 40 q24  - Lasix 40   - s/p cefepime and Levaquin (competed 7 days yesterday) - off Vanc >> kidneys toxicity  - completed oseltamivir 30 mg   - C/w Duoneb   - Bronch results - neg culture, neg fungal, cytology positive for inflammatory cells, no organisms    #Candidemia   - Exchanged CVC  - likely A line is source , found to be foul-smelling and surrounded by pus during removal  - blood cultures grew candida  - daily blood cultures   - c/w caspofungin   - ID following  - fungitell pend  - procalcitonin pending  - trend CBC     #REJI oliguric likely ATN with Vanc toxicity   - acidosis resolved, UO improved   - Cr improving  - no need RRT for now  - Nephro following  - PTH elevated     - calcitriol 0.25  - daily BMP, f/u lytes   - BUN elevated despite downtrending Cr   - Dehydration vs over feeding vs medication induced   - steroids decreased   - IVF   - Nutrition consult for tube feeds    # 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  - duplex shows DVT consistent with prior  - might need IVC filter  - not candidate for AC at this time due to alveolar hemorrhage     #Immunosuppressed: Autoimmune Hepatitis   - continue with steroid and tacrolimus  - prednisone 60 mg PO qd at home now on solumedrol   - CMV PCR neg this admission     # HTN  - holding amlodipine 10 and lopressor 50   - Monitor BPs    # heterozygous prothrombin gene mutation with hx of PE and DVT on coumadin  - holding coumadin for now due to alveolar hemorrhage   - monitor coags    #0.5 cm of GB polyp  -needs f/u US in 12 months     # Hx of asthma  - C/w montelukast qd    # GI PPx: Protonix 40 mg PO qd  # DVT PPx: sequentials to right leg only  # Activity: bedrest   # Dispo: ICU  # Code Status: FULL

## 2020-02-15 NOTE — SWALLOW BEDSIDE ASSESSMENT ADULT - SLP GENERAL OBSERVATIONS
pt received in bed awake +lethargic +generalized weakness; pt in no apparent pain. unable to voice. communicating via head nod. +o2 via high-flow nc; oral care provided.

## 2020-02-16 NOTE — PROGRESS NOTE ADULT - SUBJECTIVE AND OBJECTIVE BOX
ELDER, DONI  75y  Female  HPI:  75y/o F w/ hx of asthma, COPD not on home O2, autoimmune hepatitis on prednisone and tacrolimus, heterozygous prothrombin gene mutation with hx of PE and DVT on coumadin with recent hospitalization in January 2020 with a diagnosis of pneumonia presents for worsening SOB, hemoptysis and cough. Everything started yesterday night when patient was in bed, started to feel shortness of breath and had many episodes of hemoptysis with clots, she also had substernal chest pain non radiating, moderate in intensity that worsens on coughing. She denies fever but endorses chills. She also admits for lightheadedness that occurred yesterday, no HA, abd pain, dysuria or paresthesias. She had 2 loose bowel movements since yesterday with some blood in the stools that she attributes to her hemorrhoids. She stopped Coumadin couple of days ago since her INR was supratherapeutic. She is from [facility] short term and also mentions that her  and another family member have the flu and she was exposed to them. She did not have the flu shot this season.  In ED, temp was 100.1 rectally, tachycardic ( sinus) , she was saturating to 70s on non rebreather and her SaO2 went up to 95%. ABG showing respiratory alkalosis initially and then corrected after BIPAP placement and correction of RR.  CTA chest showing no PE but showing interval development of multifocal bilateral groundglass opacities as well as new moderate to severe bronchiectasis in the right lung. Findings are concerning for an acute infectious/inflammatory process. (31 Jan 2020 14:36)    MEDICATIONS  (STANDING):  ALBUTerol    90 MICROgram(s) HFA Inhaler 1 Puff(s) Inhalation every 6 hours  calcitriol  Solution 0.25 MICROGram(s) Oral daily  calcium carbonate    500 mG (Tums) Chewable 3 Tablet(s) Chew every 8 hours  caspofungin IVPB 50 milliGRAM(s) IV Intermittent every 24 hours  chlorhexidine 4% Liquid 1 Application(s) Topical <User Schedule>  cyanocobalamin 1000 MICROGram(s) Oral daily  desmopressin IVPB 24 MICROGram(s) IV Intermittent every 12 hours  dexMEDEtomidine Infusion 0.049 MICROgram(s)/kG/Hr (1 mL/Hr) IV Continuous <Continuous>  dextrose 5%. 1000 milliLiter(s) (50 mL/Hr) IV Continuous <Continuous>  gabapentin 300 milliGRAM(s) Oral at bedtime  influenza   Vaccine 0.5 milliLiter(s) IntraMuscular once  insulin regular Infusion 4 Unit(s)/Hr (4 mL/Hr) IV Continuous <Continuous>  ipratropium 17 MICROgram(s) HFA Inhaler 1 Puff(s) Inhalation every 6 hours  methylPREDNISolone sodium succinate Injectable 125 milliGRAM(s) IV Push every 6 hours  montelukast 10 milliGRAM(s) Oral at bedtime  multivitamin/minerals 1 Tablet(s) Oral daily  mupirocin 2% Ointment 1 Application(s) Topical two times a day  pantoprazole   Suspension 40 milliGRAM(s) Oral daily  tacrolimus 0.5 milliGRAM(s) Oral every 12 hours    MEDICATIONS  (PRN):  acetaminophen   Tablet .. 650 milliGRAM(s) Oral every 6 hours PRN Temp greater or equal to 38C (100.4F)    INTERVAL EVENTS: Patient seen today, back on [facility]. Patient was temporarily vented last night?    T(C): 35.7 (02-16-20 @ 12:00), Max: 36.6 (02-16-20 @ 00:00)  HR: 70 (02-16-20 @ 15:00) (68 - 108)  BP: 105/56 (02-16-20 @ 15:00) (76/45 - 145/60)  RR: 18 (02-16-20 @ 15:00) (14 - 39)  SpO2: 87% (02-16-20 @ 15:00) (87% - 100%)  Wt(kg): --Vital Signs Last 24 Hrs  T(C): 35.7 (16 Feb 2020 12:00), Max: 36.6 (16 Feb 2020 00:00)  T(F): 96.2 (16 Feb 2020 12:00), Max: 97.8 (16 Feb 2020 00:00)  HR: 70 (16 Feb 2020 15:00) (68 - 108)  BP: 105/56 (16 Feb 2020 15:00) (76/45 - 145/60)  BP(mean): 75 (16 Feb 2020 15:00) (53 - 98)  RR: 18 (16 Feb 2020 15:00) (14 - 39)  SpO2: 87% (16 Feb 2020 15:00) (87% - 100%)    PHYSICAL EXAM:  GENERAL: Lethargic  NECK: Supple, No JVD, Normal thyroid  CHEST/LUNG: Clear; No crackles or wheezing  HEART: S1, S2, Regular rate and rhythm;   ABDOMEN: Soft, Nontender, Nondistended; Bowel sounds present  EXTREMITIES: No clubbing, cyanosis, or edema  SKIN: No rashes or lesions    LABS:                        7.5    20.53 )-----------( 124      ( 16 Feb 2020 04:00 )             23.1             02-16    145  |  107  |  133<HH>  ----------------------------<  132<H>  3.6   |  23  |  2.0<H>    Ca    7.1<L>      16 Feb 2020 04:00  Phos  4.6     02-16  Mg     1.9     02-16    TPro  4.0<L>  /  Alb  2.2<L>  /  TBili  1.0  /  DBili  x   /  AST  141<H>  /  ALT  108<H>  /  AlkPhos  321<H>  02-16    LIVER FUNCTIONS - ( 16 Feb 2020 04:00 )  Alb: 2.2 g/dL / Pro: 4.0 g/dL / ALK PHOS: 321 U/L / ALT: 108 U/L / AST: 141 U/L / GGT: x                   PT/INR - ( 15 Feb 2020 21:21 )   PT: 14.00 sec;   INR: 1.22 ratio      PTT - ( 15 Feb 2020 21:21 )  PTT:20.8 sec    ABG - ( 16 Feb 2020 11:48 )  pH, Arterial: 7.40  pH, Blood: x     /  pCO2: 37    /  pO2: 82    / HCO3: 23    / Base Excess: -1.4  /  SaO2: 96          Culture - Sputum (collected 14 Feb 2020 17:00)  Source: .Sputum Sputum  Gram Stain (15 Feb 2020 04:56):    Few Squamous epithelial cells per low power field    Few polymorphonuclear leukocytes per low power field    Few Yeast like cells per oil power field    Few Gram variable coccobacilli per oil power field  Preliminary Report (15 Feb 2020 18:43):    Moderate Staphylococcus aureus    Normal Respiratory Nikki present    Culture - Blood (collected 14 Feb 2020 04:30)  Source: .Blood Blood-Peripheral  Preliminary Report (15 Feb 2020 14:01):    No growth to date.    Culture - Other (collected 13 Feb 2020 18:55)  Source: .Other wound  Final Report (15 Feb 2020 19:22):    No growth      RADIOLOGY & ADDITIONAL TESTS:  < from: Xray Chest 1 View- PORTABLE-Urgent (02.16.20 @ 05:38) >  Findings:    Support devices: Endotracheal tube with tip in the right mainstem bronchus. Enteric tube coursing below diaphragm. Left internal jugular central venous catheter.    Cardiac/mediastinum/hilum: Stable.    Lung parenchyma/Pleura: Bilateral opacities and effusions without significant change.    Skeleton/soft tissues: Stable    Impression:      Bilateral opacities and effusions without significant change.    Lines and support devices as described above. Endotracheal tube with tip in the right mainstem bronchus. Adjustment is recommended.    Callback request placed on 2/16/2020 at 10:34 AM        < end of copied text >

## 2020-02-16 NOTE — PROGRESS NOTE ADULT - ASSESSMENT
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   - weaned from vent => back on Hi flow NC  - remains on IV Solu-medrol  - antibiotics and antiviral completed  - continue Duoneb q6h and q4h PRN  - ID and pulm f/u appreciated    Candedemia  - central line change noted  - cultures negative x2  - 2D echo no evidence of vegatation  - ophth evaluation noted, no clinical evidence, low suspicion for ocular fungemia/endogenous endophthalmitis   - treatment as per ID    REJI on CKD 3  - intermittent Lasix doses  - off Bicarb  - creatinine trending down  - renal f/u noted  - urine output noted  - continue to monitor    Autoimmune hepatitis  - On prednisone 60 mg PO qd and Tacrolimus at home  - now on Solu-medrol  - remains Tacrolimus     HTN  - now off Labetolol    Heterozygous prothrombin gene mutation with hx of PE and DVT on coumadin:  - Coumadin had been held for suspected alveolar hemorrhage     Hx of asthma  - on Montelukast qd    Multiple Skin tears/ wounds  - local care    Diet: tolerating enteral feedings  GI PPx: Protonix 40 mg PO qd  DVT PPx: sequentials  Activity: bedrest  Dispo: readmitted to hospital from  rehab at [facility], ICU for now     Continue supportive measures, patient remains acutely ill, lethargic, with poor mental status. Patient not moving lower extremities. Overall prognosis poor.

## 2020-02-16 NOTE — CONSULT NOTE ADULT - ASSESSMENT
73y/o F w/ hx of asthma, COPD not on home O2, autoimmune hepatitis on prednisone and tacrolimus, heterozygous prothrombin gene mutation with hx of PE and DVT on coumadin ( now off anticoagulation ) admitted with SOB found to have ROBBY , acute hypoxic respiratory failure secondary to influenza A and HCAP  , candidemia , and REJI. Patient had digni shield. GI are consulted for small amount of fresh blood per rectum at midnight , and when the digni shield was removed , few blood clots came out. Patient  at the bed side , mentioned that the patient usually have small amount of fresh blood per rectum every few days which they relate to the large hemorrhoids that she have and this has been going on for 8 months. Patient denies abdominal pain , she is extubated this morning , NG tube shows biliary secretions      #fresh blood per rectum / blood clots   r/o lower GI bleed secondary to rectal ulcer from digni shield versus less likely other causes including hemorrhoids / malignancy/ AVM   patient is hemodynamically stable   Digni shield now shows brown stool   drop in HGB  one unit , corrected appropriately after transfusion     REC:   Digni shield removal   keep HGB above 8  active type and screen   CBC q 6 hrs   2 large gauge IV   Given patient  respiratory condition and comorbidities , patient  will benefit from colonoscopy on elective bases once she is more clinically optimized unless worsening GI bleed with hemodynamic instability or  significant drop in HGB then , will do flexible sigmoidoscopy on urgent bases     #0.5 cm gallbladder polyp on US:   repeat US abdomen in 6 months     f/u with Dr. Cheema / reggie  on Monday 73y/o F w/ hx of asthma, COPD not on home O2, autoimmune hepatitis on prednisone and tacrolimus, heterozygous prothrombin gene mutation with hx of PE and DVT on coumadin ( now off anticoagulation ) admitted with SOB found to have ROBBY , acute hypoxic respiratory failure secondary to influenza A and HCAP  , candidemia , and REJI. Patient had digni shield. GI are consulted for small amount of fresh blood per rectum at midnight , and when the digni shield was removed , few blood clots came out. Patient  at the bed side , mentioned that the patient usually have small amount of fresh blood per rectum every few days which they relate to the large hemorrhoids that she have and this has been going on for 8 months. Patient denies abdominal pain , she is extubated this morning , NG tube shows biliary secretions      #fresh blood per rectum / blood clots   r/o lower GI bleed secondary to rectal ulcer from digni shield versus less likely other causes including hemorrhoids / malignancy/ AVM   patient is hemodynamically stable   Digni shield now shows brown stool   drop in HGB  one unit , corrected appropriately after transfusion     REC:   Digni shield removal   keep HGB above 8  active type and screen   CBC q 6 hrs   2 large gauge IV   Given patient  respiratory condition and comorbidities , patient  will benefit from colonoscopy on elective basis once she is more clinically optimized unless worsening GI bleed with hemodynamic instability or  significant drop in HGB then , will do flexible sigmoidoscopy on urgent basis     #0.5 cm gallbladder polyp on US:   repeat US abdomen in 6 months     f/u with Dr. Cheema / reggie  on Monday

## 2020-02-16 NOTE — PROGRESS NOTE ADULT - SUBJECTIVE AND OBJECTIVE BOX
Patient is a 75y old  Female who presents with a chief complaint of SOB and desaturation (15 Feb 2020 19:32)        Over Night Events:  Was intubated last night.  On MV.  Off pressors and sedation.  Follows commands on and off.          ROS:     CONSTITUTIONAL:   no fever   no chills.  no weight gain   no weight loss    EYES:   no discharge,   no pain  no redness,   no visual changes.    ENT:   Ears: no ear pain and no hearing problems.  Nose: no nasal congestion and no nasal drainage.  Mouth/Throat: no dysphagia,  no hoarseness and no throat pain.  Neck: no lumps, no pain, no stiffness and no swollen glands.     CARDIOVASCULAR:   no chest pain,   no swelling  no palpitaions  no syncope    RESPIRATORY:  Per HPI    GASTROINTESTINAL:   no abdominal pain,   no constipation,   no diarrhea,   no vomiting.    GENITOURINARY:  no dysuria,   no frequency,   no urgency  no hematuria.    MUSCULOSKELETAL:   no back pain,   no musculoskeletal pain,  no weakness.    SKIN:   no jaundice,   no lesions,   no pruritis,   no rashes.    NEURO:   no loss of consciousness,   no gait abnormality,   no headache,   no sensory deficits,   no weakness.    PSYCHIATRIC:   no known mental health issues  no anxiety  no depression    ALLERGIC/IMMUNOLOGIC:   No active allergic or immunologic issues        PHYSICAL EXAM    ICU Vital Signs Last 24 Hrs  T(C): 36.6 (16 Feb 2020 04:00), Max: 36.6 (16 Feb 2020 00:00)  T(F): 97.8 (16 Feb 2020 04:00), Max: 97.8 (16 Feb 2020 00:00)  HR: 82 (16 Feb 2020 07:00) (74 - 108)  BP: 108/57 (16 Feb 2020 07:00) (83/45 - 145/60)  BP(mean): 77 (16 Feb 2020 07:00) (57 - 96)  ABP: --  ABP(mean): --  RR: 29 (16 Feb 2020 07:00) (17 - 43)  SpO2: 96% (16 Feb 2020 07:00) (94% - 100%)      CONSTITUTIONAL:   Ill appearing.  Well nourished.  NAD    ENT:   Airway patent,   Mouth with normal mucosa.   No thrush    EYES:   Pupils equal,   Round and reactive to light.    CARDIAC:   Normal rate,   Regular rhythm.    No edema      Vascular:  Normal systolic impulse  No Carotid bruits    RESPIRATORY:   No wheezing  Bilateral BS  Normal chest expansion  Not tachypneic,  No use of accessory muscles    GASTROINTESTINAL:  Abdomen soft,   Non-tender,   No guarding,   + BS    GENITOURINARY  normal genitalia for sex  no edema    MUSCULOSKELETAL:   Range of motion is not limited,  No muscle or joint tenderness  No clubbing, cyanosis    NEUROLOGICAL:   MOre awake.    No motor  deficits.  Follows commands     SKIN:   Skin normal color for race,   Warm and dry and intact.   No evidence of rash.    PSYCHIATRIC:   Normal mood and affect.   No apparent risk to self or others.    HEME LYMPH:   No cervical  lymphadenopathy.  no inguinal lymphadenopathy      02-15-20 @ 07:01  -  02-16-20 @ 07:00  --------------------------------------------------------  IN:    dextrose 5%.: 525 mL    Free Water: 700 mL    insulin regular Infusion: 126 mL    IV PiggyBack: 250 mL    Lactated Ringers IV Bolus: 1500 mL    lactated ringers.: 225 mL    Nepro with Carb Steady: 420 mL    Packed Red Blood Cells: 281 mL  Total IN: 4027 mL    OUT:    Indwelling Catheter - Urethral: 1775 mL    Rectal Tube: 50 mL  Total OUT: 1825 mL    Total NET: 2202 mL          LABS:                            7.5    20.53 )-----------( 124      ( 16 Feb 2020 04:00 )             23.1                                               02-16    145  |  107  |  133<HH>  ----------------------------<  132<H>  3.6   |  23  |  2.0<H>    Ca    7.1<L>      16 Feb 2020 04:00  Phos  4.6     02-16  Mg     1.9     02-16    TPro  4.0<L>  /  Alb  2.2<L>  /  TBili  1.0  /  DBili  x   /  AST  141<H>  /  ALT  108<H>  /  AlkPhos  321<H>  02-16      PT/INR - ( 15 Feb 2020 21:21 )   PT: 14.00 sec;   INR: 1.22 ratio         PTT - ( 15 Feb 2020 21:21 )  PTT:20.8 sec                                                                                     LIVER FUNCTIONS - ( 16 Feb 2020 04:00 )  Alb: 2.2 g/dL / Pro: 4.0 g/dL / ALK PHOS: 321 U/L / ALT: 108 U/L / AST: 141 U/L / GGT: x                                                  Culture - Sputum (collected 14 Feb 2020 17:00)  Source: .Sputum Sputum  Gram Stain (15 Feb 2020 04:56):    Few Squamous epithelial cells per low power field    Few polymorphonuclear leukocytes per low power field    Few Yeast like cells per oil power field    Few Gram variable coccobacilli per oil power field  Preliminary Report (15 Feb 2020 18:43):    Moderate Staphylococcus aureus    Normal Respiratory Nikki present    Culture - Blood (collected 14 Feb 2020 04:30)  Source: .Blood Blood-Peripheral  Preliminary Report (15 Feb 2020 14:01):    No growth to date.    Culture - Other (collected 13 Feb 2020 18:55)  Source: .Other wound  Final Report (15 Feb 2020 19:22):    No growth                                                   Mode: AC/ CMV (Assist Control/ Continuous Mandatory Ventilation)  RR (machine): 14  TV (machine): 450  FiO2: 100  PEEP: 5  ITime: 1  MAP: 13  PIP: 39                                      ABG - ( 16 Feb 2020 03:18 )  pH, Arterial: 7.41  pH, Blood: x     /  pCO2: 41    /  pO2: 294   / HCO3: 26    / Base Excess: 0.9   /  SaO2: 100                 MEDICATIONS  (STANDING):  ALBUTerol    90 MICROgram(s) HFA Inhaler 1 Puff(s) Inhalation every 6 hours  calcitriol  Solution 0.25 MICROGram(s) Oral daily  calcium carbonate    500 mG (Tums) Chewable 3 Tablet(s) Chew every 8 hours  caspofungin IVPB 50 milliGRAM(s) IV Intermittent every 24 hours  chlorhexidine 4% Liquid 1 Application(s) Topical <User Schedule>  cyanocobalamin 1000 MICROGram(s) Oral daily  dexMEDEtomidine Infusion 0.049 MICROgram(s)/kG/Hr (1 mL/Hr) IV Continuous <Continuous>  gabapentin 300 milliGRAM(s) Oral at bedtime  influenza   Vaccine 0.5 milliLiter(s) IntraMuscular once  insulin regular Infusion 4 Unit(s)/Hr (4 mL/Hr) IV Continuous <Continuous>  ipratropium 17 MICROgram(s) HFA Inhaler 1 Puff(s) Inhalation every 6 hours  labetalol 100 milliGRAM(s) Oral every 12 hours  methylPREDNISolone sodium succinate Injectable 40 milliGRAM(s) IV Push daily  montelukast 10 milliGRAM(s) Oral at bedtime  multivitamin/minerals 1 Tablet(s) Oral daily  mupirocin 2% Ointment 1 Application(s) Topical two times a day  pantoprazole   Suspension 40 milliGRAM(s) Oral daily  tacrolimus 0.5 milliGRAM(s) Oral every 12 hours    MEDICATIONS  (PRN):  acetaminophen   Tablet .. 650 milliGRAM(s) Oral every 6 hours PRN Temp greater or equal to 38C (100.4F)      New X-rays reviewed:                                                                                  ECHO    CXR interpreted by me:  SOLO Goddard.  OG OK.  Unchanged bilateral infiltrates

## 2020-02-16 NOTE — PROGRESS NOTE ADULT - SUBJECTIVE AND OBJECTIVE BOX
Nephrology progress note    Patient was seen and examined, events over the last 24 h noted .  Cr improving     Allergies:  No Known Allergies    Hospital Medications:   MEDICATIONS  (STANDING):  ALBUTerol    90 MICROgram(s) HFA Inhaler 1 Puff(s) Inhalation every 6 hours  calcitriol  Solution 0.25 MICROGram(s) Oral daily  calcium carbonate    500 mG (Tums) Chewable 3 Tablet(s) Chew every 8 hours  caspofungin IVPB 50 milliGRAM(s) IV Intermittent every 24 hours  chlorhexidine 4% Liquid 1 Application(s) Topical <User Schedule>  cyanocobalamin 1000 MICROGram(s) Oral daily  desmopressin IVPB 24 MICROGram(s) IV Intermittent every 12 hours  dexMEDEtomidine Infusion 0.049 MICROgram(s)/kG/Hr (1 mL/Hr) IV Continuous <Continuous>  dextrose 5%. 1000 milliLiter(s) (50 mL/Hr) IV Continuous <Continuous>  gabapentin 300 milliGRAM(s) Oral at bedtime  influenza   Vaccine 0.5 milliLiter(s) IntraMuscular once  insulin regular Infusion 4 Unit(s)/Hr (4 mL/Hr) IV Continuous <Continuous>  ipratropium 17 MICROgram(s) HFA Inhaler 1 Puff(s) Inhalation every 6 hours  methylPREDNISolone sodium succinate Injectable 125 milliGRAM(s) IV Push every 6 hours  montelukast 10 milliGRAM(s) Oral at bedtime  multivitamin/minerals 1 Tablet(s) Oral daily  mupirocin 2% Ointment 1 Application(s) Topical two times a day  pantoprazole   Suspension 40 milliGRAM(s) Oral daily  tacrolimus 0.5 milliGRAM(s) Oral every 12 hours        VITALS:  T(F): 96.2 (20 @ 08:00), Max: 97.8 (20 @ 00:00)  HR: 98 (20 @ 10:00)  BP: 113/60 (20 @ 10:00)  RR: 22 (20 @ 10:00)  SpO2: 100% (20 @ 10:00)  Wt(kg): --     @ 07:01  -  02-15 @ 07:00  --------------------------------------------------------  IN: 2672 mL / OUT: 2225 mL / NET: 447 mL    02-15 @ 07: @ 07:00  --------------------------------------------------------  IN: 4027 mL / OUT: 1825 mL / NET: 2202 mL     @ 07: @ 11:53  --------------------------------------------------------  IN: 52 mL / OUT: 325 mL / NET: -273 mL          PHYSICAL EXAM:  	Gen: on  high flow o2  	Pulm:  B/L xiomara   	CV: S1S2; no rub  	Abd: +distended  	: lory   	LE: edema    LABS:      145  |  107  |  133<HH>  ----------------------------<  132<H>  3.6   |  23  |  2.0<H>    Ca    7.1<L>      2020 04:00  Phos  4.6       Mg     1.9         TPro  4.0<L>  /  Alb  2.2<L>  /  TBili  1.0  /  DBili      /  AST  141<H>  /  ALT  108<H>  /  AlkPhos  321<H>                            7.5    20.53 )-----------( 124      ( 2020 04:00 )             23.1       Urine Studies:  Urinalysis Basic - ( 2020 19:38 )    Color: Yellow / Appearance: Slightly Turbid / S.012 / pH:   Gluc:  / Ketone: Negative  / Bili: Negative / Urobili: <2 mg/dL   Blood:  / Protein: 30 mg/dL / Nitrite: Negative   Leuk Esterase: Large / RBC: 23 /HPF /  /HPF   Sq Epi:  / Non Sq Epi: 0 /HPF / Bacteria: Negative        RADIOLOGY & ADDITIONAL STUDIES:

## 2020-02-16 NOTE — CONSULT NOTE ADULT - SUBJECTIVE AND OBJECTIVE BOX
Gastroenterology Consultation:    Patient is a 75y old  Female who presents with a chief complaint of SOB and desaturation (16 Feb 2020 08:19)      Admitted on: 01-31-20  HPI:  75y/o F w/ hx of asthma, COPD not on home O2, autoimmune hepatitis on prednisone and tacrolimus, heterozygous prothrombin gene mutation with hx of PE and DVT on coumadin with recent hospitalization in January 2020 with a diagnosis of pneumonia presents for worsening SOB, hemoptysis and cough. Everything started yesterday night when patient was in bed, started to feel shortness of breath and had many episodes of hemoptysis with clots, she also had substernal chest pain non radiating, moderate in intensity that worsens on coughing. She denies fever but endorses chills. She also admits for lightheadedness that occurred yesterday, no HA, abd pain, dysuria or paresthesias. She had 2 loose bowel movements since yesterday with some blood in the stools that she attributes to her hemorrhoids. She stopped Coumadin couple of days ago since her INR was supratherapeutic. She is from [facility] [employer] and also mentions that her  and another family member have the flu and she was exposed to them. She did not have the flu shot this season.  In ED, temp was 100.1 rectally, tachycardic ( sinus) , she was saturating to 70s on non rebreather and her SaO2 went up to 95%. ABG showing respiratory alkalosis initially and then corrected after BIPAP placement and correction of RR.  CTA chest showing no PE but showing interval development of multifocal bilateral groundglass opacities as well as new moderate to severe bronchiectasis in the right lung. Findings are concerning for an acute infectious/inflammatory process. (31 Jan 2020 14:36)    GI history :  75y/o F w/ hx of asthma, COPD not on home O2, autoimmune hepatitis on prednisone and tacrolimus, heterozygous prothrombin gene mutation with hx of PE and DVT on coumadin admitted with SOB found to have ROBBY , acute hypoxic respiratory failure secondary to influenza A and HCAP  , candidemia , and REJI. Patient had digni shield. GI are consulted for small amount of fresh blood per rectum at midnight , and when the digni shield was removed , few blood clots came out. Patient  at the bed side , mentioned that the patient usually have small amount of fresh blood per rectum every few days which they relate to the large hemorrhoids that she have and this has been going on for 8 months. Patient denies abdominal pain , she is extubated this morning , NG tube shows biliary secretions       Prior EGD: none in records   Prior Colonoscopy: one year ago with dr Nicole showing hemorrhoids according to        PAST MEDICAL & SURGICAL HISTORY:  Prothrombin gene mutation  Autoimmune hepatitis  Other chronic pulmonary embolism without acute cor pulmonale  Essential hypertension  Autoimmune hepatitis treated with steroids  Asthma  DVT (deep venous thrombosis)  S/P debridement  History of back surgery      FAMILY HISTORY:  No pertinent family history in first degree relatives      Social History:  Tobacco:  former smoker   Alcohol: denies   Drugs: denies     Home Medications:  acetaminophen 500 mg oral tablet: 2 tab(s) orally every 8 hours (20 Jan 2020 13:38)  amLODIPine 10 mg oral tablet: 1 tab(s) orally once a day (at bedtime) (20 Jan 2020 13:38)  budesonide 3 mg oral capsule, extended release: 1 cap(s) orally 2 times a day (05 Dec 2019 17:19)  ferrous sulfate 325 mg (65 mg elemental iron) oral delayed release tablet: 1 tab(s) orally once a day (05 Dec 2019 17:19)  furosemide 20 mg oral tablet: 1 tab(s) orally once a day (05 Dec 2019 17:19)  gabapentin 300 mg oral capsule: 1 cap(s) orally once a day (at bedtime) (20 Jan 2020 13:38)  ibuprofen 400 mg oral tablet: 1 tab(s) orally every 6 hours, As needed, Moderate Pain (4 - 6) (20 Jan 2020 13:38)  ipratropium-albuterol 0.5 mg-2.5 mg/3 mLinhalation solution: 3 milliliter(s) inhaled every 6 hours, As Needed (20 Jan 2020 13:40)  lidocaine 5% topical film: Apply topically to affected area once a day (20 Jan 2020 13:38)  Metoprolol Tartrate 50 mg oral tablet: 1 tab(s) orally once a day (05 Dec 2019 17:19)  montelukast 10 mg oral tablet: 1 tab(s) orally once a day (at bedtime) (05 Dec 2019 17:19)  Multiple Vitamins with Minerals oral tablet: 1 tab(s) orally once a day (20 Jan 2020 13:38)  oxyCODONE 10 mg oral tablet: 1 tab(s) orally every 6 hours, As needed, Severe Pain (7 - 10) (20 Jan 2020 13:38)  predniSONE 20 mg oral tablet: 3 tab(s) orally once a day (20 Jan 2020 13:38)  ProAir HFA 90 mcg/inh inhalation aerosol: 1 puff(s) inhaled every 4 hours, As Needed (20 Jan 2020 13:40)  risedronate 150 mg oral tablet: 1 tab(s) orally once a month (05 Dec 2019 17:19)  tacrolimus 0.5 mg oral capsule: 1 cap(s) orally every 12 hours (05 Dec 2019 17:19)  tiotropium 18 mcg inhalation capsule: 1 cap(s) inhaled once a day, As Needed (20 Jan 2020 13:40)  Vitamin B12 1000 mcg oral tablet: 1 tab(s) orally once a day (05 Dec 2019 17:19)  Vitamin C 1000 mg oral tablet: 1 tab(s) orally once a day (05 Dec 2019 17:19)  warfarin 4 mg oral tablet: 1 tab(s) orally once a day (at bedtime) (20 Jan 2020 13:38)    MEDICATIONS  (STANDING):  ALBUTerol    90 MICROgram(s) HFA Inhaler 1 Puff(s) Inhalation every 6 hours  calcitriol  Solution 0.25 MICROGram(s) Oral daily  calcium carbonate    500 mG (Tums) Chewable 3 Tablet(s) Chew every 8 hours  caspofungin IVPB 50 milliGRAM(s) IV Intermittent every 24 hours  chlorhexidine 4% Liquid 1 Application(s) Topical <User Schedule>  cyanocobalamin 1000 MICROGram(s) Oral daily  desmopressin IVPB 24 MICROGram(s) IV Intermittent every 12 hours  dexMEDEtomidine Infusion 0.049 MICROgram(s)/kG/Hr (1 mL/Hr) IV Continuous <Continuous>  dextrose 5%. 1000 milliLiter(s) (50 mL/Hr) IV Continuous <Continuous>  gabapentin 300 milliGRAM(s) Oral at bedtime  influenza   Vaccine 0.5 milliLiter(s) IntraMuscular once  insulin regular Infusion 4 Unit(s)/Hr (4 mL/Hr) IV Continuous <Continuous>  ipratropium 17 MICROgram(s) HFA Inhaler 1 Puff(s) Inhalation every 6 hours  methylPREDNISolone sodium succinate Injectable 125 milliGRAM(s) IV Push every 6 hours  montelukast 10 milliGRAM(s) Oral at bedtime  multivitamin/minerals 1 Tablet(s) Oral daily  mupirocin 2% Ointment 1 Application(s) Topical two times a day  pantoprazole   Suspension 40 milliGRAM(s) Oral daily  tacrolimus 0.5 milliGRAM(s) Oral every 12 hours    MEDICATIONS  (PRN):  acetaminophen   Tablet .. 650 milliGRAM(s) Oral every 6 hours PRN Temp greater or equal to 38C (100.4F)      Allergies  No Known Allergies      Review of Systems:   limited secondary to patient condition , recent extubation           Physical Examination:  T(C): 35.7 (02-16-20 @ 08:00), Max: 36.6 (02-16-20 @ 00:00)  HR: 98 (02-16-20 @ 10:00) (68 - 108)  BP: 113/60 (02-16-20 @ 10:00) (76/45 - 145/60)  RR: 22 (02-16-20 @ 10:00) (14 - 43)  SpO2: 100% (02-16-20 @ 10:00) (89% - 100%)      02-14-20 @ 07:01  -  02-15-20 @ 07:00  --------------------------------------------------------  IN: 2672 mL / OUT: 2225 mL / NET: 447 mL    02-15-20 @ 07:01  -  02-16-20 @ 07:00  --------------------------------------------------------  IN: 4027 mL / OUT: 1825 mL / NET: 2202 mL    02-16-20 @ 07:01  -  02-16-20 @ 10:53  --------------------------------------------------------  IN: 52 mL / OUT: 325 mL / NET: -273 mL        Constitutional: No acute distress.  Eyes:. Conjunctivae are clear, Sclera is non-icteric.  Ears Nose and Throat: The external ears are normal appearing,  Oral mucosa is pink and moist.  Respiratory:  No signs of respiratory distress. Lung sounds are clear bilaterally.  Cardiovascular:  S1 S2, Regular rate and rhythm.  GI: Abdomen is soft, symmetric, and non-tender without distention.Bowel sounds are present and normoactive in all four quadrants. No masses, hepatomegaly, or splenomegaly are noted.   Neuro: No Tremor, No involuntary movements  Skin: No rashes, No Jaundice.          Data: (reviewed by attending)                        7.5    20.53 )-----------( 124      ( 16 Feb 2020 04:00 )             23.1     Hgb Trend:  7.5  02-16-20 @ 04:00  7.9  02-15-20 @ 23:40  7.0  02-15-20 @ 21:21  6.8  02-15-20 @ 18:30  7.7  02-15-20 @ 04:30  7.6  02-14-20 @ 04:50  8.2  02-13-20 @ 12:38      02-15-20 @ 07:01  -  02-16-20 @ 07:00  --------------------------------------------------------  IN: 281 mL      02-16    145  |  107  |  133<HH>  ----------------------------<  132<H>  3.6   |  23  |  2.0<H>    Ca    7.1<L>      16 Feb 2020 04:00  Phos  4.6     02-16  Mg     1.9     02-16    TPro  4.0<L>  /  Alb  2.2<L>  /  TBili  1.0  /  DBili  x   /  AST  141<H>  /  ALT  108<H>  /  AlkPhos  321<H>  02-16    Liver panel trend:  TBili 1.0   /      /      /   AlkP 321   /   Tptn 4.0   /   Alb 2.2    /   DBili --      02-16  TBili 0.3   /   AST 19   /   ALT 18   /   AlkP 170   /   Tptn 4.5   /   Alb 2.6    /   DBili --      02-10  TBili 0.4   /   AST 17   /   ALT 17   /   AlkP 147   /   Tptn 4.5   /   Alb 2.4    /   DBili --      02-07  TBili 0.5   /   AST 16   /   ALT 17   /   AlkP 156   /   Tptn 4.9   /   Alb 2.7    /   DBili --      02-06      PT/INR - ( 15 Feb 2020 21:21 )   PT: 14.00 sec;   INR: 1.22 ratio         PTT - ( 15 Feb 2020 21:21 )  PTT:20.8 sec    Culture - Sputum (collected 14 Feb 2020 17:00)  Source: .Sputum Sputum  Gram Stain (15 Feb 2020 04:56):    Few Squamous epithelial cells per low power field    Few polymorphonuclear leukocytes per low power field    Few Yeast like cells per oil power field    Few Gram variable coccobacilli per oil power field  Preliminary Report (15 Feb 2020 18:43):    Moderate Staphylococcus aureus    Normal Respiratory Nikki present    Culture - Blood (collected 14 Feb 2020 04:30)  Source: .Blood Blood-Peripheral  Preliminary Report (15 Feb 2020 14:01):    No growth to date.    Culture - Other (collected 13 Feb 2020 18:55)  Source: .Other wound  Final Report (15 Feb 2020 19:22):    No growth          Radiology:(reviewed by attending)

## 2020-02-16 NOTE — CHART NOTE - NSCHARTNOTEFT_GEN_A_CORE
PAteint is bleeding from rectum  Evaluatied patient  VS WNL  Recatal exam shws no palpable hemorroids with dark red blood and clots  Patient is s/p 1 uPRBC and hemoglobin is 7.9 which is appropriate response from transfusion  Also labs are suspicious for DIC  If continuous to bleed may consider FFP and platelt tx

## 2020-02-16 NOTE — PROGRESS NOTE ADULT - ASSESSMENT
73 y/o F with REJI in hospital for SOB, hemoptysis, cough.  PMH asthma, COPD not on home O2, autoimmune hepatitis on prednisone and tacrolimus, heterozygous prothrombin gene mutation with hx of PE and DVT on coumadin, recent hospitalization for pneumonia   # REJI/ likely ATN in nature   # creatinine trending down   # non oliguric  # off  iv fluids   #  need to hold tacrolimus  in view of infection , if ok with GI   # corrected calcium around 8.2, on Ca carbonate, increased to 3 tablets q 8h  PTH noted , on calcitriol 0.25 mcg q24, , phos  improving   # wbc noted, on steroids followed by id , remains  on antifungal   # no acute indication for RRT  # will follow  # prognosis guarded

## 2020-02-16 NOTE — AIRWAY PLACEMENT NOTE ADULT - POST AIRWAY PLACEMENT ASSESSMENT:
breath sounds bilateral/positive end tidal CO2 noted/breath sounds equal/CXR pending/chest excursion noted
breath sounds equal/breath sounds bilateral/CXR pending/chest excursion noted/positive end tidal CO2 noted

## 2020-02-17 NOTE — PROGRESS NOTE ADULT - ASSESSMENT
75 y/o F with REJI in hospital for SOB, hemoptysis, cough.  PMH asthma, COPD not on home O2, autoimmune hepatitis on prednisone and tacrolimus, heterozygous prothrombin gene mutation with hx of PE and DVT on coumadin, recent hospitalization for pneumonia   # REJI/ likely ATN in nature   # creatinine trending down   # non oliguric  # hypernatremia improving , d/c d5  # BUN noted, on steroids and GI bleed   #  need to hold tacrolimus  in view of infection   # check ESR, ANCA, TEE , DNA levels   # corrected calcium around 8.2, on Ca carbonate, increased to 3 tablets q 8h  PTH noted , on calcitriol 0.25 mcg q24, , phos  improving   # wbc noted, on steroids followed by id , remains  on antifungal   # s/p bronchoscopy , followed by pulmonary  # GI notes appreciated   # no acute indication for RRT  # will follow  # prognosis guarded

## 2020-02-17 NOTE — PROGRESS NOTE ADULT - ASSESSMENT
74y female with PMH of asthma, COPD not on home O2, autoimmune hepatitis on prednisone and tacrolimus, heterozygous prothrombin gene mutation with hx of PE and DVT on coumadin presents to the hospital complaining of cough, hemoptysis, SOB and desaturation to 70s. Pt was found to be flu+ likely viral PNA complicated by diffuse alveolar hemorrhage and ARDS . Hospital course complicated by DVT in L saphenous vein with possible DVT and REJI likely secondary to ATN due to vanco.      #ARDS  1-alveolar hemorrhage   2-flu +ve with post viral pneumonia resolved   3-possible PE: cant start AC due to alveolar hemorrhage    - s/p DDAVP 4 doses   - Solumedrol  - Lasix 40   - s/p cefepime and Levaquin (competed 7 days yesterday) - off Vanc >> kidneys toxicity  - completed oseltamivir 30 mg   - C/w Duoneb   - Bronch results - neg culture, neg fungal, cytology positive for inflammatory cells, no organisms  - Repeat Bronch 2/16 showed bleeding from DAH    #Candidemia   - Exchanged CVC  - likely A line is source , found to be foul-smelling and surrounded by pus during removal  - blood cultures grew candida  - cultures neg since 2/14  - daily blood cultures   - c/w caspofungin   - ID following  - trend CBC   - sputum cx grew MRSA 	    #REJI oliguric likely ATN with Vanc toxicity   - acidosis resolved, UO improved   - no need RRT for now  - Nephro following  - PTH elevated     - calcitriol 0.25  - daily BMP, f/u lytes   - BUN and Cr downtrending    # 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  - duplex shows DVT consistent with prior  - might need IVC filter  - not candidate for AC at this time due to alveolar hemorrhage     #Immunosuppressed: Autoimmune Hepatitis   - continue with steroid and tacrolimus  - prednisone 60 mg PO qd at home now on solumedrol   - CMV PCR neg this admission     # HTN  - holding amlodipine 10 and lopressor 50   - Monitor BPs    # heterozygous prothrombin gene mutation with hx of PE and DVT on coumadin  - holding coumadin for now due to alveolar hemorrhage   - monitor coags    #0.5 cm of GB polyp  -needs f/u US in 12 months     # Hx of asthma  - C/w montelukast qd    # GI PPx: Protonix 40 mg PO qd  # DVT PPx: sequentials to right leg only  # Activity: bedrest   # Dispo: ICU  # Code Status: FULL

## 2020-02-17 NOTE — SWALLOW BEDSIDE ASSESSMENT ADULT - SLP PERTINENT HISTORY OF CURRENT PROBLEM
pt admitted from [facility] (there for short-term rehab) for worsening SOB hemoptysis and cough. PMHx: asthma, COPD not on home O2, autoimmune hepatitis; recent hospitalization in January 2020 with a diagnosis of pneumonia; pt being treated for acute hypoxic resp failure with hypoxia due to influenza and HCAP, Septic shock in immunocompromised patient; pt intubated 2/1 2' respiratory distress while on bipap. s/p extubation 2/14.

## 2020-02-17 NOTE — CHART NOTE - NSCHARTNOTEFT_GEN_A_CORE
Registered Dietitian Follow-Up    ***Scroll to the bottom for RD recommendation***    Patient Profile Reviewed                           Yes [x]   No []  Nutrition History Previously Obtained        Yes []  No [x]          PERTINENT SUBJECTIVE INFORMATION (LATEST AS OF TODAY):  - FAMILY at bedside.   - Pt is awake but not very alert. Has HFNC.   - FAMILY reports of pt usual intake is great at home, takes MVI daily, no supplement. NKFA. UBW around 160# but has gained recently due to the use of prednisone.         PERTINENT MEDICAL INFORMATIONS:  (1) P/w SOB and desat.  (2) Acute hypoxic resp failure w/ hypoxia due to Influenza. HCAP.  (3) Wean for vent back on Hi flow NC. s/p extubation 2/15-16?  (4) s/p abx and anti-viral  (5) ID and pulm to follow.   (6) REJI on CKD renal consulted.  (7) Local wound care          DIET ORDER:   NPO (failed SLP)        ANTHROPOMETRICS:  - Ht.  162.6cm  - Wt.   (2/1): 74.8kg   (2/2): 74.3kg  (2/3): 81.5kg  (2/4): 78.4kg  (2/8): 87.7kg  (2/10): 86.7kg  (2/16): 86kg - weight trended up likely from edema. will monitor.  - BMI. 28- 30.8  - IBW       PERTINENT LAB DATA:  2/17:  9.1/26.6, , Cr 18, glucose 192, albumin 2.5, GFR 27  PERTINENT MEDS: abx, d5w, insulin, precedex, methylprednisolone, acetaminophen, calcitriol, protonix, vitamin b12, MVI      PHYSICAL FINDINGS  - APPEARANCE:        awake but not very alert and oriented. ?confusion. 2+ generalized edema, 3+ b/l arm edema  - GI FUNCTION:        Rectal tube per EMR  - TUBES:                       - ORAL/MOUTH:      NPO, failed SLP  - SKIN:                        ecchymosis        NUTRITION REQUIREMENTS  WEIGHT USED:                          Ht: 162.6cm, Wt: 74.8kg (more likely pt's wt PTA, BMI: 28.5  ESTIMATED ENERGY NEEDS:       CONTINUE [  ]      ADJUST [ x ]    ESTIMATED ENERGY NEEDS:         8462-3426 kcal/day (1228 MSJ x 1.2-1.3)  ESTIMATED PROTEIN NEEDS:        67-75 g/day (0.9-1.0 g/kg of ABW) - poor renal status at this time, adjust prn  ESTIMATED FLUID NEEDS:             fluid per ICU team    CURRENT NUTRIENT NEEDS:    NPO          [  x] PREVIOUS NUTRITION DIAGNOSIS:    (1) Less than optimal EN - resolved now            [  ] ONGOING        [  x] RESOLVED    NUTRITION DIAGNOSTIC #1 (NEW)  PROBLEM:                   (1) Inadequate energy intake (re initiated)  ETIOLOGY:                   current diet order s/p extubation  SIGN/SYMPTOMS:      NPO status due to failing SLP eval        PATIENT INTERVENTION:    [ x ] ORAL        [ x] EN/TF     GOAL/EXPECTED OUTCOME:     pt to consume and tolerate >50% of all meals upon f/u in 3 days once pt passes SLP eval.   INDICATOR/MONITORING:       RD to monitor diet order, energy intake, body composition, nutrition focused physical findings (PO tolerance, s/s, appetite, renal profile)  NUTRITION INTERVENTION:        Meals and snacks. Coordination of care. enteral nutrition        RECS: (1)  Pending SLP re-evaluation, please order diet per SLP recs when pt passes the test. (2) If pt to fail and NGT is placed, may start tube feed at Jevity 1.2 at 300ml q6hr (each feed may run for 2 hour long). This regimen gives a total of 1425 kcal/ 66g protein/ 2200mg potassium (will monitor renal function)/  972mL free water, additional flushes per ICU team.

## 2020-02-17 NOTE — PROGRESS NOTE ADULT - SUBJECTIVE AND OBJECTIVE BOX
Patient is a 75y old  Female who presents with a chief complaint of SOB and desaturation (17 Feb 2020 07:22)      Over Night Events:  Patient seen and examined. as per team look better mentally   still high flow       ROS:  See HPI    PHYSICAL EXAM    ICU Vital Signs Last 24 Hrs  T(C): 36.9 (17 Feb 2020 04:00), Max: 37.3 (17 Feb 2020 00:00)  T(F): 98.4 (17 Feb 2020 04:00), Max: 99.1 (17 Feb 2020 00:00)  HR: 108 (17 Feb 2020 07:00) (64 - 116)  BP: 162/76 (17 Feb 2020 07:00) (76/45 - 165/77)  BP(mean): 104 (17 Feb 2020 07:00) (53 - 116)  ABP: --  ABP(mean): --  RR: 26 (17 Feb 2020 07:00) (17 - 36)  SpO2: 96% (17 Feb 2020 07:00) (86% - 100%)      General:  AO  HEENT:        pale         Lymph Nodes: NO cervical LN   Lungs: Bilateral rhonchi   Cardiovascular: Regular   Abdomen: Soft, Positive BS  Extremities: No clubbing 3 edema with ecchymosis   Skin: warm   Neurological: weakness b/l   Musculoskeletal: move all ext     I&O's Detail    16 Feb 2020 07:01  -  17 Feb 2020 07:00  --------------------------------------------------------  IN:    dextrose 5%.: 1100 mL    insulin regular Infusion: 83 mL    IV PiggyBack: 500 mL    Packed Red Blood Cells: 326 mL  Total IN: 2009 mL    OUT:    Indwelling Catheter - Urethral: 1515 mL    Rectal Tube: 100 mL  Total OUT: 1615 mL    Total NET: 394 mL          LABS:                          9.1    20.49 )-----------( 136      ( 17 Feb 2020 04:55 )             26.6         17 Feb 2020 04:55    143    |  107    |  121    ----------------------------<  192    4.0     |  21     |  1.8      Ca    7.4        17 Feb 2020 04:55  Phos  4.6       16 Feb 2020 04:00  Mg     1.9       16 Feb 2020 04:00    TPro  4.3    /  Alb  2.5    /  TBili  1.0    /  DBili  0.5    /  AST  49     /  ALT  80     /  AlkPhos  266    17 Feb 2020 04:55  Amylase x     lipase x                                                 PT/INR - ( 17 Feb 2020 04:55 )   PT: 14.60 sec;   INR: 1.27 ratio         PTT - ( 15 Feb 2020 21:21 )  PTT:20.8 sec                                                                                                   Culture - Blood (collected 15 Feb 2020 04:57)  Source: .Blood None  Preliminary Report (16 Feb 2020 18:01):    No growth to date.    Culture - Sputum (collected 14 Feb 2020 17:00)  Source: .Sputum Sputum  Gram Stain (15 Feb 2020 04:56):    Few Squamous epithelial cells per low power field    Few polymorphonuclear leukocytes per low power field    Few Yeast like cells per oil power field    Few Gram variable coccobacilli per oil power field  Final Report (16 Feb 2020 17:44):    Moderate Methicillin resistant Staphylococcus aureus    Normal Respiratory Nikki present  Organism: Methicillin resistant Staphylococcus aureus (16 Feb 2020 17:44)  Organism: Methicillin resistant Staphylococcus aureus (16 Feb 2020 17:44)                                                   Mode: CPAP with PS  FiO2: 40  PEEP: 5  PS: 8                                      ABG - ( 16 Feb 2020 11:48 )  pH, Arterial: 7.40  pH, Blood: x     /  pCO2: 37    /  pO2: 82    / HCO3: 23    / Base Excess: -1.4  /  SaO2: 96                  MEDICATIONS  (STANDING):  ALBUTerol    90 MICROgram(s) HFA Inhaler 1 Puff(s) Inhalation every 6 hours  calcitriol  Solution 0.25 MICROGram(s) Oral daily  calcium carbonate    500 mG (Tums) Chewable 3 Tablet(s) Chew every 8 hours  caspofungin IVPB 50 milliGRAM(s) IV Intermittent every 24 hours  chlorhexidine 4% Liquid 1 Application(s) Topical <User Schedule>  cyanocobalamin 1000 MICROGram(s) Oral daily  dexMEDEtomidine Infusion 0.049 MICROgram(s)/kG/Hr (1 mL/Hr) IV Continuous <Continuous>  dextrose 5%. 1000 milliLiter(s) (50 mL/Hr) IV Continuous <Continuous>  gabapentin 300 milliGRAM(s) Oral at bedtime  influenza   Vaccine 0.5 milliLiter(s) IntraMuscular once  insulin regular Infusion 4 Unit(s)/Hr (4 mL/Hr) IV Continuous <Continuous>  ipratropium 17 MICROgram(s) HFA Inhaler 1 Puff(s) Inhalation every 6 hours  methylPREDNISolone sodium succinate Injectable 125 milliGRAM(s) IV Push every 6 hours  montelukast 10 milliGRAM(s) Oral at bedtime  multivitamin/minerals 1 Tablet(s) Oral daily  mupirocin 2% Ointment 1 Application(s) Topical two times a day  pantoprazole   Suspension 40 milliGRAM(s) Oral daily  tacrolimus 0.5 milliGRAM(s) Oral every 12 hours    MEDICATIONS  (PRN):  acetaminophen   Tablet .. 650 milliGRAM(s) Oral every 6 hours PRN Temp greater or equal to 38C (100.4F)          Xrays:  TLC:  OG:  ET tube:                                                                                    b/l opacity    ECHO:  [facility] ICU:

## 2020-02-17 NOTE — PROGRESS NOTE ADULT - ASSESSMENT
IMPRESSION:    Acute hypoxic respiratory failure   DAH resolved   Influenza A treated   ARDS  Candidemia   REJI improving   HO Autoimmune hepatitis on tacrolimus and chronic steroids   h/o hypercoagulable state/ vte was on coumadin   Left great saphenous vein thrombosis chronic    PLAN:    CNS:  Keep off sedation.  FU MS.      HEENT: ET care.  Oral care.      PULMONARY:  HOB @ 45 degrees. Solumedrol 125 mg q6 hrs for now. Wean O2. Aspiration precautions.    low threshold for intubation    NIV  at night and as needed   CARDIOVASCULAR:  I=O.  D5 W 50 cc per hour     GI: GI prophylaxis.  NG feeding    RENAL:  Follow up lytes. Replete as needed.  FU with Renal.  FU lytes 6 pm    INFECTIOUS DISEASE: Follow up cultures.  Continue Anti fungal. FU with ID   start Zyvox if ok by ID sputum positive MRSA     HEMATOLOGICAL:  DVT prophylaxis.  fu cbc     ENDOCRINE:  Follow up FS.  Insulin protocol if needed.    MUSCULOSKELETAL: Bed rest     Hogan for retention     Code status: full     Prognosis: Poor prognosis     Continue MICU monitoring for now.

## 2020-02-17 NOTE — PROGRESS NOTE ADULT - SUBJECTIVE AND OBJECTIVE BOX
ELDER, DONI  75y  Female  HPI:  73y/o F w/ hx of asthma, COPD not on home O2, autoimmune hepatitis on prednisone and tacrolimus, heterozygous prothrombin gene mutation with hx of PE and DVT on coumadin with recent hospitalization in January 2020 with a diagnosis of pneumonia presents for worsening SOB, hemoptysis and cough. Everything started yesterday night when patient was in bed, started to feel shortness of breath and had many episodes of hemoptysis with clots, she also had substernal chest pain non radiating, moderate in intensity that worsens on coughing. She denies fever but endorses chills. She also admits for lightheadedness that occurred yesterday, no HA, abd pain, dysuria or paresthesias. She had 2 loose bowel movements since yesterday with some blood in the stools that she attributes to her hemorrhoids. She stopped Coumadin couple of days ago since her INR was supratherapeutic. She is from [facility] short term and also mentions that her  and another family member have the flu and she was exposed to them. She did not have the flu shot this season.  In ED, temp was 100.1 rectally, tachycardic ( sinus) , she was saturating to 70s on non rebreather and her SaO2 went up to 95%. ABG showing respiratory alkalosis initially and then corrected after BIPAP placement and correction of RR.  CTA chest showing no PE but showing interval development of multifocal bilateral groundglass opacities as well as new moderate to severe bronchiectasis in the right lung. Findings are concerning for an acute infectious/inflammatory process. (31 Jan 2020 14:36)    MEDICATIONS  (STANDING):  ALBUTerol    90 MICROgram(s) HFA Inhaler 1 Puff(s) Inhalation every 6 hours  calcitriol  Solution 0.25 MICROGram(s) Oral daily  calcium carbonate    500 mG (Tums) Chewable 3 Tablet(s) Chew every 8 hours  caspofungin IVPB 50 milliGRAM(s) IV Intermittent every 24 hours  chlorhexidine 4% Liquid 1 Application(s) Topical <User Schedule>  cyanocobalamin 1000 MICROGram(s) Oral daily  dexMEDEtomidine Infusion 0.049 MICROgram(s)/kG/Hr (1 mL/Hr) IV Continuous <Continuous>  dextrose 5%. 1000 milliLiter(s) (50 mL/Hr) IV Continuous <Continuous>  gabapentin 300 milliGRAM(s) Oral at bedtime  influenza   Vaccine 0.5 milliLiter(s) IntraMuscular once  insulin regular Infusion 4 Unit(s)/Hr (4 mL/Hr) IV Continuous <Continuous>  ipratropium 17 MICROgram(s) HFA Inhaler 1 Puff(s) Inhalation every 6 hours  methylPREDNISolone sodium succinate Injectable 125 milliGRAM(s) IV Push every 6 hours  montelukast 10 milliGRAM(s) Oral at bedtime  multivitamin/minerals 1 Tablet(s) Oral daily  mupirocin 2% Ointment 1 Application(s) Topical two times a day  pantoprazole   Suspension 40 milliGRAM(s) Oral daily  tacrolimus 0.5 milliGRAM(s) Oral every 12 hours    MEDICATIONS  (PRN):  acetaminophen   Tablet .. 650 milliGRAM(s) Oral every 6 hours PRN Temp greater or equal to 38C (100.4F)    INTERVAL EVENTS: Patient seen today,  and son at bedside. Patient more alert, follows commands, remains very weak. Patient attempting to speak.  concerned she is not being fed.    T(C): 36.3 (02-17-20 @ 08:00), Max: 37.3 (02-17-20 @ 00:00)  HR: 102 (02-17-20 @ 08:00) (64 - 116)  BP: 140/70 (02-17-20 @ 08:00) (80/46 - 165/77)  RR: 21 (02-17-20 @ 08:00) (17 - 32)  SpO2: 93% (02-17-20 @ 08:00) (86% - 100%)  Wt(kg): --Vital Signs Last 24 Hrs  T(C): 36.3 (17 Feb 2020 08:00), Max: 37.3 (17 Feb 2020 00:00)  T(F): 97.3 (17 Feb 2020 08:00), Max: 99.1 (17 Feb 2020 00:00)  HR: 102 (17 Feb 2020 08:00) (64 - 116)  BP: 140/70 (17 Feb 2020 08:00) (80/46 - 165/77)  BP(mean): 108 (17 Feb 2020 08:00) (54 - 116)  RR: 21 (17 Feb 2020 08:00) (17 - 32)  SpO2: 93% (17 Feb 2020 08:00) (86% - 100%)    PHYSICAL EXAM:  GENERAL: NAD remains on hi flow NC  NECK: Supple, No JVD  CHEST/LUNG: Decreased BS; No wheezing  HEART: S1, S2, Regular rate and rhythm;   ABDOMEN: Soft, tenderness RUQ?  Bowel sounds present  EXTREMITIES: +++ edema, skin tears, bruising    LABS:  Labs:                        9.1    20.49 )-----------( 136      ( 17 Feb 2020 04:55 )             26.6             02-17    143  |  107  |  121<HH>  ----------------------------<  192<H>  4.0   |  21  |  1.8<H>    Ca    7.4<L>      17 Feb 2020 04:55  Phos  4.6     02-16  Mg     1.9     02-16    TPro  4.3<L>  /  Alb  2.5<L>  /  TBili  1.0  /  DBili  0.5<H>  /  AST  49<H>  /  ALT  80<H>  /  AlkPhos  266<H>  02-17    LIVER FUNCTIONS - ( 17 Feb 2020 04:55 )  Alb: 2.5 g/dL / Pro: 4.3 g/dL / ALK PHOS: 266 U/L / ALT: 80 U/L / AST: 49 U/L / GGT: x                   PT/INR - ( 17 Feb 2020 04:55 )   PT: 14.60 sec;   INR: 1.27 ratio         PTT - ( 15 Feb 2020 21:21 )  PTT:20.8 sec      ABG - ( 16 Feb 2020 11:48 )  pH, Arterial: 7.40  pH, Blood: x     /  pCO2: 37    /  pO2: 82    / HCO3: 23    / Base Excess: -1.4  /  SaO2: 96                          Culture - Blood (collected 15 Feb 2020 04:57)  Source: .Blood None  Preliminary Report (16 Feb 2020 18:01):    No growth to date.    Culture - Sputum (collected 14 Feb 2020 17:00)  Source: .Sputum Sputum  Gram Stain (15 Feb 2020 04:56):    Few Squamous epithelial cells per low power field    Few polymorphonuclear leukocytes per low power field    Few Yeast like cells per oil power field    Few Gram variable coccobacilli per oil power field  Final Report (16 Feb 2020 17:44):    Moderate Methicillin resistant Staphylococcus aureus    Normal Respiratory Nikki present  Organism: Methicillin resistant Staphylococcus aureus (16 Feb 2020 17:44)  Organism: Methicillin resistant Staphylococcus aureus (16 Feb 2020 17:44)      RADIOLOGY & ADDITIONAL TESTS: ELDER, DONI  75y  Female  HPI:  75y/o F w/ hx of asthma, COPD not on home O2, autoimmune hepatitis on prednisone and tacrolimus, heterozygous prothrombin gene mutation with hx of PE and DVT on coumadin with recent hospitalization in January 2020 with a diagnosis of pneumonia presents for worsening SOB, hemoptysis and cough. Everything started yesterday night when patient was in bed, started to feel shortness of breath and had many episodes of hemoptysis with clots, she also had substernal chest pain non radiating, moderate in intensity that worsens on coughing. She denies fever but endorses chills. She also admits for lightheadedness that occurred yesterday, no HA, abd pain, dysuria or paresthesias. She had 2 loose bowel movements since yesterday with some blood in the stools that she attributes to her hemorrhoids. She stopped Coumadin couple of days ago since her INR was supratherapeutic. She is from [facility] short term and also mentions that her  and another family member have the flu and she was exposed to them. She did not have the flu shot this season.  In ED, temp was 100.1 rectally, tachycardic ( sinus) , she was saturating to 70s on non rebreather and her SaO2 went up to 95%. ABG showing respiratory alkalosis initially and then corrected after BIPAP placement and correction of RR.  CTA chest showing no PE but showing interval development of multifocal bilateral groundglass opacities as well as new moderate to severe bronchiectasis in the right lung. Findings are concerning for an acute infectious/inflammatory process. (31 Jan 2020 14:36)    MEDICATIONS  (STANDING):  ALBUTerol    90 MICROgram(s) HFA Inhaler 1 Puff(s) Inhalation every 6 hours  calcitriol  Solution 0.25 MICROGram(s) Oral daily  calcium carbonate    500 mG (Tums) Chewable 3 Tablet(s) Chew every 8 hours  caspofungin IVPB 50 milliGRAM(s) IV Intermittent every 24 hours  chlorhexidine 4% Liquid 1 Application(s) Topical <User Schedule>  cyanocobalamin 1000 MICROGram(s) Oral daily  dexMEDEtomidine Infusion 0.049 MICROgram(s)/kG/Hr (1 mL/Hr) IV Continuous <Continuous>  dextrose 5%. 1000 milliLiter(s) (50 mL/Hr) IV Continuous <Continuous>  gabapentin 300 milliGRAM(s) Oral at bedtime  influenza   Vaccine 0.5 milliLiter(s) IntraMuscular once  insulin regular Infusion 4 Unit(s)/Hr (4 mL/Hr) IV Continuous <Continuous>  ipratropium 17 MICROgram(s) HFA Inhaler 1 Puff(s) Inhalation every 6 hours  methylPREDNISolone sodium succinate Injectable 125 milliGRAM(s) IV Push every 6 hours  montelukast 10 milliGRAM(s) Oral at bedtime  multivitamin/minerals 1 Tablet(s) Oral daily  mupirocin 2% Ointment 1 Application(s) Topical two times a day  pantoprazole   Suspension 40 milliGRAM(s) Oral daily  tacrolimus 0.5 milliGRAM(s) Oral every 12 hours    MEDICATIONS  (PRN):  acetaminophen   Tablet .. 650 milliGRAM(s) Oral every 6 hours PRN Temp greater or equal to 38C (100.4F)    INTERVAL EVENTS: Patient seen today,  and son at bedside. Patient more alert, follows commands, remains very weak. Patient attempting to speak.  concerned she is not being fed.    T(C): 36.3 (02-17-20 @ 08:00), Max: 37.3 (02-17-20 @ 00:00)  HR: 102 (02-17-20 @ 08:00) (64 - 116)  BP: 140/70 (02-17-20 @ 08:00) (80/46 - 165/77)  RR: 21 (02-17-20 @ 08:00) (17 - 32)  SpO2: 93% (02-17-20 @ 08:00) (86% - 100%)  Wt(kg): --Vital Signs Last 24 Hrs  T(C): 36.3 (17 Feb 2020 08:00), Max: 37.3 (17 Feb 2020 00:00)  T(F): 97.3 (17 Feb 2020 08:00), Max: 99.1 (17 Feb 2020 00:00)  HR: 102 (17 Feb 2020 08:00) (64 - 116)  BP: 140/70 (17 Feb 2020 08:00) (80/46 - 165/77)  BP(mean): 108 (17 Feb 2020 08:00) (54 - 116)  RR: 21 (17 Feb 2020 08:00) (17 - 32)  SpO2: 93% (17 Feb 2020 08:00) (86% - 100%)    PHYSICAL EXAM:  GENERAL: NAD remains on hi flow NC  NECK: Supple, No JVD  CHEST/LUNG: Decreased BS; No wheezing  HEART: S1, S2, Regular rate and rhythm;   ABDOMEN: Soft, tenderness RUQ?  Bowel sounds present  EXTREMITIES: +++ edema, skin tears, bruising    LABS:                        9.1    20.49 )-----------( 136      ( 17 Feb 2020 04:55 )             26.6             02-17    143  |  107  |  121<HH>  ----------------------------<  192<H>  4.0   |  21  |  1.8<H>    Ca    7.4<L>      17 Feb 2020 04:55  Phos  4.6     02-16  Mg     1.9     02-16    TPro  4.3<L>  /  Alb  2.5<L>  /  TBili  1.0  /  DBili  0.5<H>  /  AST  49<H>  /  ALT  80<H>  /  AlkPhos  266<H>  02-17    LIVER FUNCTIONS - ( 17 Feb 2020 04:55 )  Alb: 2.5 g/dL / Pro: 4.3 g/dL / ALK PHOS: 266 U/L / ALT: 80 U/L / AST: 49 U/L / GGT: x                   PT/INR - ( 17 Feb 2020 04:55 )   PT: 14.60 sec;   INR: 1.27 ratio         PTT - ( 15 Feb 2020 21:21 )  PTT:20.8 sec      ABG - ( 16 Feb 2020 11:48 )  pH, Arterial: 7.40  pH, Blood: x     /  pCO2: 37    /  pO2: 82    / HCO3: 23    / Base Excess: -1.4  /  SaO2: 96            Culture - Blood (collected 15 Feb 2020 04:57)  Source: .Blood None  Preliminary Report (16 Feb 2020 18:01):    No growth to date.    Culture - Sputum (collected 14 Feb 2020 17:00)  Source: .Sputum Sputum  Gram Stain (15 Feb 2020 04:56):    Few Squamous epithelial cells per low power field    Few polymorphonuclear leukocytes per low power field    Few Yeast like cells per oil power field    Few Gram variable coccobacilli per oil power field  Final Report (16 Feb 2020 17:44):    Moderate Methicillin resistant Staphylococcus aureus    Normal Respiratory Nikki present  Organism: Methicillin resistant Staphylococcus aureus (16 Feb 2020 17:44)  Organism: Methicillin resistant Staphylococcus aureus (16 Feb 2020 17:44)      RADIOLOGY & ADDITIONAL TESTS:

## 2020-02-17 NOTE — PROGRESS NOTE ADULT - ASSESSMENT
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   - weaned from vent => back on Hi flow NC  - remains on IV Solu-medrol  - antibiotics and antiviral completed  - continue Duoneb q6h and q4h PRN  - ID and pulm f/u appreciated    Candedemia  - central line change noted  - cultures negative x2  - 2D echo no evidence of vegatation  - ophth evaluation noted, no clinical evidence, low suspicion for ocular fungemia/endogenous endophthalmitis   - on Caspofungin  as per ID    REJI on CKD 3  - creatinine trending down  - renal f/u noted  - urine output noted  - continue to monitor    Autoimmune hepatitis  - On prednisone 60 mg PO qd and Tacrolimus at home  - now on Solu-medrol  - Tacrolimus held?    HTN  - now off Labetolol    Heterozygous prothrombin gene mutation with hx of PE and DVT on coumadin:  - Coumadin had been held for suspected alveolar hemorrhage     Hx of asthma  - on Montelukast qd    Multiple Skin tears/ wounds  - local care    Acute blood loss, Lower GI bleed  - GI noted  - H/H noted  - rectal tube discontinued    Diet: NPO at present, await repeat swallowing evaluation, may need another feeding tube  GI PPx: Protonix 40 mg PO qd  DVT PPx: sequentials  Activity: bedrest  Dispo: readmitted to hospital from  rehab at [facility], ICU for now     Continue supportive measures, patient remains acutely ill, lethargic, with poor mental status. Patient not moving lower extremities. Overall prognosis poor.

## 2020-02-17 NOTE — PROGRESS NOTE ADULT - SUBJECTIVE AND OBJECTIVE BOX
seen and examined  on high flow o2     PAST HISTORY  --------------------------------------------------------------------------------  No significant changes to PMH, PSH, FHx, SHx, unless otherwise noted    ALLERGIES & MEDICATIONS  --------------------------------------------------------------------------------  Allergies    No Known Allergies    Intolerances      Standing Inpatient Medications  ALBUTerol    90 MICROgram(s) HFA Inhaler 1 Puff(s) Inhalation every 6 hours  calcitriol  Solution 0.25 MICROGram(s) Oral daily  calcium carbonate    500 mG (Tums) Chewable 3 Tablet(s) Chew every 8 hours  caspofungin IVPB 50 milliGRAM(s) IV Intermittent every 24 hours  chlorhexidine 4% Liquid 1 Application(s) Topical <User Schedule>  cyanocobalamin 1000 MICROGram(s) Oral daily  dexMEDEtomidine Infusion 0.049 MICROgram(s)/kG/Hr IV Continuous <Continuous>  dextrose 5%. 1000 milliLiter(s) IV Continuous <Continuous>  gabapentin 300 milliGRAM(s) Oral at bedtime  influenza   Vaccine 0.5 milliLiter(s) IntraMuscular once  insulin regular Infusion 4 Unit(s)/Hr IV Continuous <Continuous>  ipratropium 17 MICROgram(s) HFA Inhaler 1 Puff(s) Inhalation every 6 hours  methylPREDNISolone sodium succinate Injectable 125 milliGRAM(s) IV Push every 6 hours  montelukast 10 milliGRAM(s) Oral at bedtime  multivitamin/minerals 1 Tablet(s) Oral daily  mupirocin 2% Ointment 1 Application(s) Topical two times a day  pantoprazole   Suspension 40 milliGRAM(s) Oral daily  tacrolimus 0.5 milliGRAM(s) Oral every 12 hours    PRN Inpatient Medications  acetaminophen   Tablet .. 650 milliGRAM(s) Oral every 6 hours PRN      VITALS/PHYSICAL EXAM  --------------------------------------------------------------------------------  T(C): 35.3 (02-16-20 @ 20:00), Max: 35.7 (02-16-20 @ 08:00)  HR: 116 (02-17-20 @ 05:00) (64 - 116)  BP: 156/77 (02-17-20 @ 05:00) (76/45 - 156/77)  RR: 24 (02-17-20 @ 05:00) (17 - 36)  SpO2: 99% (02-17-20 @ 05:00) (86% - 100%)  Wt(kg): --        02-16-20 @ 07:01  -  02-17-20 @ 07:00  --------------------------------------------------------  IN: 1849 mL / OUT: 1400 mL / NET: 449 mL      Physical Exam:  	Gen: on high flow o2  	Pulm:  B/L xiomara   	CV: S1S2; no rub  	Abd: +distended  	: lory   	LE: edema  	      LABS/STUDIES  --------------------------------------------------------------------------------              9.1    20.49 >-----------<  136      [02-17-20 @ 04:55]              26.6     143  |  107  |  121  ----------------------------<  192      [02-17-20 @ 04:55]  4.0   |  21  |  1.8        Ca     7.4     [02-17-20 @ 04:55]      Mg     1.9     [02-16-20 @ 04:00]      Phos  4.6     [02-16-20 @ 04:00]    TPro  4.3  /  Alb  2.5  /  TBili  1.0  /  DBili  0.5  /  AST  49  /  ALT  80  /  AlkPhos  266  [02-17-20 @ 04:55]    PT/INR: PT 14.60, INR 1.27       [02-17-20 @ 04:55]  PTT: 20.8       [02-15-20 @ 21:21]          [02-15-20 @ 20:52]    Creatinine Trend:  SCr 1.8 [02-17 @ 04:55]  SCr 1.8 [02-16 @ 17:25]  SCr 2.0 [02-16 @ 04:00]  SCr 2.2 [02-15 @ 17:11]  SCr 2.5 [02-15 @ 04:30]    Urinalysis - [02-11-20 @ 19:38]      Color Yellow / Appearance Slightly Turbid / SG 1.012 / pH 6.0      Gluc Negative / Ketone Negative  / Bili Negative / Urobili <2 mg/dL       Blood Moderate / Protein 30 mg/dL / Leuk Est Large / Nitrite Negative      RBC 23 /  / Hyaline 9 / Gran  / Sq Epi  / Non Sq Epi 0 / Bacteria Negative      PTH -- (Ca 7.3)      [02-10-20 @ 18:00]   398  HbA1c 6.9      [03-12-18 @ 04:54]  TSH 0.57      [02-01-20 @ 04:46]  Lipid: chol 203, , HDL 74,       [02-01-20 @ 04:46]      Rheumatoid Factor 59      [02-01-20 @ 04:46]  anti-GBM <1.0      [02-01-20 @ 04:46]  Free Light Chains: kappa 4.52, lambda 3.30, ratio = 1.37      [02-01 @ 04:46]  Immunofixation Serum:   No Monoclonal Band Identified    Reference Range: None Detected      [02-01-20 @ 04:46]  SPEP Interpretation: Normal Electrophoresis Pattern      [02-01-20 @ 04:46]

## 2020-02-17 NOTE — PROGRESS NOTE ADULT - SUBJECTIVE AND OBJECTIVE BOX
Hospital Day:  17d    Subjective:    Pt was interviewed and examined at the bedside in the AM. No acute events overnight. Unable to assess ROS.   Pt was intubated and extubated over the weekend. Bronchoscopy was done over the weekend.       Past Medical Hx:   Prothrombin gene mutation  Autoimmune hepatitis  Other chronic pulmonary embolism without acute cor pulmonale  Essential hypertension  Autoimmune hepatitis treated with steroids  Asthma  DVT (deep venous thrombosis)    Past Sx:  S/P debridement  History of back surgery  No significant past surgical history    Allergies:  No Known Allergies    Current Meds:   Standng Meds:  ALBUTerol    90 MICROgram(s) HFA Inhaler 1 Puff(s) Inhalation every 6 hours  calcitriol  Solution 0.25 MICROGram(s) Oral daily  calcium carbonate    500 mG (Tums) Chewable 3 Tablet(s) Chew every 8 hours  caspofungin IVPB 50 milliGRAM(s) IV Intermittent every 24 hours  chlorhexidine 4% Liquid 1 Application(s) Topical <User Schedule>  cyanocobalamin 1000 MICROGram(s) Oral daily  dexMEDEtomidine Infusion 0.049 MICROgram(s)/kG/Hr (1 mL/Hr) IV Continuous <Continuous>  dextrose 5%. 1000 milliLiter(s) (50 mL/Hr) IV Continuous <Continuous>  gabapentin 300 milliGRAM(s) Oral at bedtime  influenza   Vaccine 0.5 milliLiter(s) IntraMuscular once  insulin regular Infusion 4 Unit(s)/Hr (4 mL/Hr) IV Continuous <Continuous>  ipratropium 17 MICROgram(s) HFA Inhaler 1 Puff(s) Inhalation every 6 hours  methylPREDNISolone sodium succinate Injectable 125 milliGRAM(s) IV Push every 6 hours  montelukast 10 milliGRAM(s) Oral at bedtime  multivitamin/minerals 1 Tablet(s) Oral daily  mupirocin 2% Ointment 1 Application(s) Topical two times a day  pantoprazole   Suspension 40 milliGRAM(s) Oral daily  tacrolimus 0.5 milliGRAM(s) Oral every 12 hours    PRN Meds:  acetaminophen   Tablet .. 650 milliGRAM(s) Oral every 6 hours PRN Temp greater or equal to 38C (100.4F)    HOME MEDICATIONS:  acetaminophen 500 mg oral tablet: 2 tab(s) orally every 8 hours  amLODIPine 10 mg oral tablet: 1 tab(s) orally once a day (at bedtime)  budesonide 3 mg oral capsule, extended release: 1 cap(s) orally 2 times a day  ferrous sulfate 325 mg (65 mg elemental iron) oral delayed release tablet: 1 tab(s) orally once a day  furosemide 20 mg oral tablet: 1 tab(s) orally once a day  gabapentin 300 mg oral capsule: 1 cap(s) orally once a day (at bedtime)  ibuprofen 400 mg oral tablet: 1 tab(s) orally every 6 hours, As needed, Moderate Pain (4 - 6)  ipratropium-albuterol 0.5 mg-2.5 mg/3 mLinhalation solution: 3 milliliter(s) inhaled every 6 hours, As Needed  lidocaine 5% topical film: Apply topically to affected area once a day  Metoprolol Tartrate 50 mg oral tablet: 1 tab(s) orally once a day  montelukast 10 mg oral tablet: 1 tab(s) orally once a day (at bedtime)  Multiple Vitamins with Minerals oral tablet: 1 tab(s) orally once a day  oxyCODONE 10 mg oral tablet: 1 tab(s) orally every 6 hours, As needed, Severe Pain (7 - 10)  predniSONE 20 mg oral tablet: 3 tab(s) orally once a day  ProAir HFA 90 mcg/inh inhalation aerosol: 1 puff(s) inhaled every 4 hours, As Needed  risedronate 150 mg oral tablet: 1 tab(s) orally once a month  tacrolimus 0.5 mg oral capsule: 1 cap(s) orally every 12 hours  tiotropium 18 mcg inhalation capsule: 1 cap(s) inhaled once a day, As Needed  Vitamin B12 1000 mcg oral tablet: 1 tab(s) orally once a day  Vitamin C 1000 mg oral tablet: 1 tab(s) orally once a day  warfarin 4 mg oral tablet: 1 tab(s) orally once a day (at bedtime)      Vital Signs:   T(F): 97.3 (02-17-20 @ 08:00), Max: 99.1 (02-17-20 @ 00:00)  HR: 102 (02-17-20 @ 08:00) (64 - 116)  BP: 140/70 (02-17-20 @ 08:00) (80/46 - 165/77)  RR: 21 (02-17-20 @ 08:00) (17 - 32)  SpO2: 93% (02-17-20 @ 08:00) (86% - 100%)      02-16-20 @ 07:01  -  02-17-20 @ 07:00  --------------------------------------------------------  IN: 2009 mL / OUT: 1615 mL / NET: 394 mL    02-17-20 @ 07:01  -  02-17-20 @ 08:54  --------------------------------------------------------  IN: 58 mL / OUT: 60 mL / NET: -2 mL        Physical Exam:   CONSTITUTIONAL: NAD, lethargic   HEAD: Normocephalic; atraumatic, NG+ on high flow   EYES: PERRL, EOMI, no conjunctival erythema  CARD: +S1, S2 Regular rate and rhythm, tachy  RESP: b/l ronchi  ABD: soft nontender, distended, no rebound, no guarding, no rigidity,   EXT: edema on limbs, wounds on b/l arms   NEURO: opens eyes, does not follows commands, to weak to move limbs   Lines: L IJ         Labs:                         9.1    20.49 )-----------( 136      ( 17 Feb 2020 04:55 )             26.6     Neutophil% 97.2, Lymphocyte% 1.0, Monocyte% 0.6, Bands% 1.1 02-17-20 @ 04:55    17 Feb 2020 04:55    143    |  107    |  121    ----------------------------<  192    4.0     |  21     |  1.8      Ca    7.4        17 Feb 2020 04:55  Phos  4.6       16 Feb 2020 04:00  Mg     1.9       16 Feb 2020 04:00    TPro  4.3    /  Alb  2.5    /  TBili  1.0    /  DBili  0.5    /  AST  49     /  ALT  80     /  AlkPhos  266    17 Feb 2020 04:55       pTT    --             ----< 1.27 INR  (02-17-20 @ 04:55)    14.60        PT,    pTT    --             ----< 1.16 INR  (02-16-20 @ 17:25)    13.30        PT    Culture - Blood (collected 02-15-20 @ 04:57)  Source: .Blood None  Preliminary Report (02-16-20 @ 18:01):    No growth to date.    Culture - Blood (collected 02-14-20 @ 04:30)  Source: .Blood Blood-Peripheral  Preliminary Report (02-15-20 @ 14:01):    No growth to date.    Culture - Blood (collected 02-12-20 @ 04:50)  Source: .Blood None  Gram Stain (02-13-20 @ 14:24):    Growth in aerobic bottle: Yeast like cells  Preliminary Report (02-14-20 @ 15:27):    Growth in aerobic bottle: Candida albicans    See previous culture [id number]    Culture - Blood (collected 02-11-20 @ 11:21)  Source: .Blood None  Gram Stain (02-12-20 @ 16:48):    Growth in aerobic bottle: Yeast like cells  Final Report (02-15-20 @ 14:05):    Growth in aerobic bottle: Candida albicans    ***Blood Panel PCR results on this specimen are available    approximately 3 hours after the Gram stain result.***    Gram stain, PCR, and/or culture results may not always    correspond due to difference in methodologies.    ************************************************************    This PCR assay was performed using [employer].    The following targets are tested for: Enterococcus,    vancomycin resistant enterococci, Listeria monocytogenes,    coagulase negative staphylococci, S. aureus,    methicillin resistant S. aureus, Streptococcus agalactiae    (Group B), S. pneumoniae, S. pyogenes (Group A),    Acinetobacter baumannii, Enterobacter cloacae, E. coli,    Klebsiella oxytoca, K. pneumoniae, Proteus sp.,    Serratia marcescens, Haemophilus influenzae,    Neisseria meningitidis, Pseudomonas aeruginosa, Candida    albicans, C. glabrata, C krusei, C parapsilosis,    C. tropicalis and the KPC resistance gene.  Organism: Blood Culture PCR  Candida albicans (02-15-20 @ 14:05)  Organism: Candida albicans (02-15-20 @ 14:05)      -  Fluconazole: S 0.25 Fluconazole = Data established for mucosal disease, and is generally applicable for invasive disease.  Susceptibility is dependent on achieving the maximal possible blood level.  Doses of 400 MG/day or more may be required in adults with normal renalfunction and body habitus.      -  Interpretation: See note This test method is not approved by the FDA and is for research use only.  The performance characteristics of this assay may have been determined by [employer] and [facility], [place], [place]                            N/I - No interpretation available                                                         SDD – Sensitive Dose Dependent      Method Type: YSTMIC  Organism: Blood Culture PCR (02-15-20 @ 14:05)      -  Candida albicans: Detec      Method Type: PCR        Radiology:

## 2020-02-18 NOTE — SWALLOW BEDSIDE ASSESSMENT ADULT - SLP PERTINENT HISTORY OF CURRENT PROBLEM
pt admitted from [facility] (there for short-term rehab) for worsening SOB hemoptysis and cough. PMHx: asthma, COPD not on home O2, autoimmune hepatitis; recent hospitalization in January 2020 with a diagnosis of pneumonia; pt being treated for acute hypoxic resp failure with hypoxia due to influenza and HCAP, Septic shock in immunocompromised patient; pt intubated 2/1 2' respiratory distress while on bipap. s/p extubation 2/14.

## 2020-02-18 NOTE — PROGRESS NOTE ADULT - SUBJECTIVE AND OBJECTIVE BOX
Patient is a 75y old  Female who presents with a chief complaint of SOB and desaturation (18 Feb 2020 07:46)      Over Night Events:  Patient seen and examined more comfortable today awake on high flow       ROS:  See HPI    PHYSICAL EXAM    ICU Vital Signs Last 24 Hrs  T(C): 35.7 (18 Feb 2020 04:00), Max: 37 (17 Feb 2020 20:00)  T(F): 96.3 (18 Feb 2020 04:00), Max: 98.6 (17 Feb 2020 20:00)  HR: 94 (18 Feb 2020 07:00) (75 - 108)  BP: 172/79 (18 Feb 2020 07:00) (135/72 - 185/88)  BP(mean): 125 (18 Feb 2020 07:00) (93 - 141)  ABP: --  ABP(mean): --  RR: 19 (18 Feb 2020 07:00) (15 - 26)  SpO2: 96% (18 Feb 2020 07:00) (88% - 100%)      General:AOx3  HEENT:     pale            Lymph Nodes: NO cervical LN   Lungs: Bilateral crackles   Cardiovascular: Regular   Abdomen: Soft, Positive BS  Extremities: No clubbing   Skin: warm   Neurological:  no focal deficit b/l weakness   Musculoskeletal: move all ext     I&O's Detail    17 Feb 2020 07:01  -  18 Feb 2020 07:00  --------------------------------------------------------  IN:    dextrose 5%.: 750 mL    insulin regular Infusion: 61 mL    IV PiggyBack: 550 mL  Total IN: 1361 mL    OUT:    Indwelling Catheter - Urethral: 1515 mL  Total OUT: 1515 mL    Total NET: -154 mL          LABS:                          9.4    22.44 )-----------( 143      ( 18 Feb 2020 06:07 )             27.5         18 Feb 2020 06:07    146    |  107    |  102    ----------------------------<  167    3.4     |  22     |  1.5      Ca    7.5        18 Feb 2020 06:07  Phos  4.7       18 Feb 2020 06:07  Mg     2.0       18 Feb 2020 06:07    TPro  4.8    /  Alb  2.7    /  TBili  1.3    /  DBili  x      /  AST  33     /  ALT  69     /  AlkPhos  235    18 Feb 2020 06:07  Amylase x     lipase x                                                 PT/INR - ( 17 Feb 2020 04:55 )   PT: 14.60 sec;   INR: 1.27 ratio                                                                                                                                                                                             ABG - ( 16 Feb 2020 11:48 )  pH, Arterial: 7.40  pH, Blood: x     /  pCO2: 37    /  pO2: 82    / HCO3: 23    / Base Excess: -1.4  /  SaO2: 96                  MEDICATIONS  (STANDING):  ALBUTerol    90 MICROgram(s) HFA Inhaler 1 Puff(s) Inhalation every 6 hours  calcitriol  Solution 0.25 MICROGram(s) Oral daily  calcium carbonate    500 mG (Tums) Chewable 3 Tablet(s) Chew every 8 hours  caspofungin IVPB 50 milliGRAM(s) IV Intermittent every 24 hours  chlorhexidine 4% Liquid 1 Application(s) Topical <User Schedule>  cyanocobalamin 1000 MICROGram(s) Oral daily  dexMEDEtomidine Infusion 0.049 MICROgram(s)/kG/Hr (1 mL/Hr) IV Continuous <Continuous>  dextrose 5%. 1000 milliLiter(s) (50 mL/Hr) IV Continuous <Continuous>  gabapentin 300 milliGRAM(s) Oral at bedtime  influenza   Vaccine 0.5 milliLiter(s) IntraMuscular once  insulin regular Infusion 4 Unit(s)/Hr (4 mL/Hr) IV Continuous <Continuous>  ipratropium 17 MICROgram(s) HFA Inhaler 1 Puff(s) Inhalation every 6 hours  linezolid  IVPB 600 milliGRAM(s) IV Intermittent every 12 hours  methylPREDNISolone sodium succinate Injectable 125 milliGRAM(s) IV Push every 6 hours  montelukast 10 milliGRAM(s) Oral at bedtime  multivitamin/minerals 1 Tablet(s) Oral daily  mupirocin 2% Ointment 1 Application(s) Topical two times a day  pantoprazole   Suspension 40 milliGRAM(s) Oral daily  potassium chloride   Powder 40 milliEquivalent(s) Oral once  tacrolimus 0.5 milliGRAM(s) Oral every 12 hours    MEDICATIONS  (PRN):  acetaminophen   Tablet .. 650 milliGRAM(s) Oral every 6 hours PRN Temp greater or equal to 38C (100.4F)          Xrays:  TLC:  OG:  ET tube:                                                                                    pending    ECHO:  [facility] ICU:

## 2020-02-18 NOTE — PROGRESS NOTE ADULT - ASSESSMENT
Assessment:     73y/o F w/ hx of asthma, COPD not on home O2, autoimmune hepatitis on prednisone and tacrolimus, heterozygous prothrombin gene mutation with hx of PE and DVT on coumadin (currently being held due to suspected alveolar hemorrhage) with recent hospitalization in January 2020 with a diagnosis of pneumonia presents for worsening SOB, hemoptysis and cough x 1 day.  Patient was intubated and admitted to MICU for Acute hypoxic resp failure with hypoxia due to influenza and HCAP w/ Septic shock in immunocompromised patient. Patient was found to be flu positive, likely viral PNA complicated by suspected diffuse alveolar hemorrhage (anticoagulation currently held) and ARDS.  Hospital course complicated by DVT in L saphenous vein and REJI (on CKD stage 3) likely secondary to ATN due to Vancomycin dosing.      S/p extubation and weaning off sedation patient was slow to regain consciousness and was unable to follow commands or engage in neurological exam.  EEG was negative for epileptiform activity.  Though patient is marginally improved since last exam, she continues to be nonverbal and with continued deficits on neurological exam.      Recommendations:     -MRI Brain w/o contrast     attending note to follow

## 2020-02-18 NOTE — PROGRESS NOTE ADULT - SUBJECTIVE AND OBJECTIVE BOX
Neurology Consult    Interval History:     History obtained from patient's family as patient is unable to verbalize at this time.  They report that she appears to be improving since last evaluation, as she is more able to follow commands at this time and appears to be more alert and able to nod responses to questions.      On exam, patient is alert, and able to intermittently follow some commands.  She is able to open her eyes without prompting, is able to track for EOM testing, however, unable to engage in strength testing and has significantly decreased tone in b/l upper and lower extremities.        PAST MEDICAL & SURGICAL HISTORY:  Prothrombin gene mutation  Autoimmune hepatitis  Other chronic pulmonary embolism without acute cor pulmonale  Essential hypertension  Autoimmune hepatitis treated with steroids  Asthma  DVT (deep venous thrombosis)  S/P debridement  History of back surgery      FAMILY HISTORY:  No pertinent family history in first degree relatives      Social History: (-) x 3    Allergies    No Known Allergies    Intolerances        MEDICATIONS  (STANDING):  albuterol/ipratropium for Nebulization 3 milliLiter(s) Nebulizer every 6 hours  calcitriol  Solution 0.25 MICROGram(s) Oral daily  calcium carbonate    500 mG (Tums) Chewable 3 Tablet(s) Chew every 8 hours  caspofungin IVPB 50 milliGRAM(s) IV Intermittent every 24 hours  chlorhexidine 4% Liquid 1 Application(s) Topical <User Schedule>  cyanocobalamin 1000 MICROGram(s) Oral daily  dextrose 5%. 1000 milliLiter(s) (50 mL/Hr) IV Continuous <Continuous>  gabapentin 300 milliGRAM(s) Oral at bedtime  influenza   Vaccine 0.5 milliLiter(s) IntraMuscular once  insulin regular Infusion 4 Unit(s)/Hr (4 mL/Hr) IV Continuous <Continuous>  linezolid  IVPB 600 milliGRAM(s) IV Intermittent every 12 hours  methylPREDNISolone sodium succinate Injectable 80 milliGRAM(s) IV Push every 6 hours  montelukast 10 milliGRAM(s) Oral at bedtime  multivitamin/minerals 1 Tablet(s) Oral daily  mupirocin 2% Ointment 1 Application(s) Topical two times a day  pantoprazole   Suspension 40 milliGRAM(s) Oral daily    MEDICATIONS  (PRN):  acetaminophen   Tablet .. 650 milliGRAM(s) Oral every 6 hours PRN Temp greater or equal to 38C (100.4F)  oxyCODONE    IR 10 milliGRAM(s) Oral every 8 hours PRN Severe Pain (7 - 10)      Review of systems:     Unable to elicit due to patient's inability to participate in interview.       Vital Signs Last 24 Hrs  T(C): 36.8 (18 Feb 2020 11:00), Max: 37 (17 Feb 2020 20:00)  T(F): 98.3 (18 Feb 2020 11:00), Max: 98.6 (17 Feb 2020 20:00)  HR: 68 (18 Feb 2020 16:00) (66 - 100)  BP: 155/77 (18 Feb 2020 16:00) (122/67 - 185/88)  BP(mean): 125 (18 Feb 2020 16:00) (97 - 141)  RR: 11 (18 Feb 2020 16:00) (11 - 26)  SpO2: 98% (18 Feb 2020 16:00) (88% - 99%)    Neurologic Examination:  General:  Appearance is consistent with chronologic age.  No abnormal facies. Patient is extubated.   General: Patient is able to open eyes spontaneously.  She is unable to answer questions to assess orientation, however, nods her head yes when asked if she is in the hospital   Cranial nerves: EOMI intact w/o nystagmus; right pupil round and reactive to light, left pupil w/ cataract, unable to assess. unable to assess facial sensation, hearing, palate or tongue position. No facial asymmetry. corneal reflex present.   Motor examination:   patient has significantly decresed tone in b/l upper and lower extremities as she is unable to hold them up independently.  0/5 strength on b/l U+LE; patient able to wiggle her toes b/l   Sensory examination:   patient does not respond pinprick or pain stimulus.       Labs:   CBC Full  -  ( 18 Feb 2020 06:07 )  WBC Count : 22.44 K/uL  RBC Count : 3.16 M/uL  Hemoglobin : 9.4 g/dL  Hematocrit : 27.5 %  Platelet Count - Automated : 143 K/uL  Mean Cell Volume : 87.0 fL  Mean Cell Hemoglobin : 29.7 pg  Mean Cell Hemoglobin Concentration : 34.2 g/dL  Auto Neutrophil # : 21.59 K/uL  Auto Lymphocyte # : 0.32 K/uL  Auto Monocyte # : 0.26 K/uL  Auto Eosinophil # : 0.00 K/uL  Auto Basophil # : 0.04 K/uL  Auto Neutrophil % : 96.2 %  Auto Lymphocyte % : 1.4 %  Auto Monocyte % : 1.2 %  Auto Eosinophil % : 0.0 %  Auto Basophil % : 0.2 %    02-18    146  |  107  |  102<HH>  ----------------------------<  167<H>  3.4<L>   |  22  |  1.5    Ca    7.5<L>      18 Feb 2020 06:07  Phos  4.7     02-18  Mg     2.0     02-18    TPro  4.8<L>  /  Alb  2.7<L>  /  TBili  1.3<H>  /  DBili  x   /  AST  33  /  ALT  69<H>  /  AlkPhos  235<H>  02-18    LIVER FUNCTIONS - ( 18 Feb 2020 06:07 )  Alb: 2.7 g/dL / Pro: 4.8 g/dL / ALK PHOS: 235 U/L / ALT: 69 U/L / AST: 33 U/L / GGT: x           PT/INR - ( 17 Feb 2020 04:55 )   PT: 14.60 sec;   INR: 1.27 ratio           Neuroimaging:    NCHCT:     < from: CT Head No Cont (02.10.20 @ 22:29) >  IMPRESSION:     Parenchymal atrophy compatible with the patient's age  No CT evidence of acute fracture, intracranial hemorrhage, midline shift, or mass effect.      EEG       < from: EEG (02.12.20 @ 12:50) >    Generalized Slowing  Yes  moderate - severe    Impression  Abnormal due to the presence of: generalized slowing as above    < end of copied text >

## 2020-02-18 NOTE — PROGRESS NOTE ADULT - ASSESSMENT
75 y/o F with REJI in hospital for SOB, hemoptysis, cough.  PMH asthma, COPD not on home O2, autoimmune hepatitis on prednisone and tacrolimus, heterozygous prothrombin gene mutation with hx of PE and DVT on coumadin, recent hospitalization for pneumonia   # REJI/ likely ATN in nature   # creatinine trending down   # non oliguric  # hypernatremia improving , d/c d5 if positive NGT feed can give free water 300 q 6   # BUN noted, trending down  on steroids and GI bleed   #  need to hold tacrolimus  in view of infection   # check ESR, ANCA, TEE , DNA levels   # corrected calcium around 8.2, on Ca carbonate, increased to 3 tablets q 8h  PTH noted , on calcitriol 0.25 mcg q24, , phos at goal   # wbc noted, on steroids followed by id , remains  on antifungal   # s/p bronchoscopy , followed by pulmonary  # GI notes appreciated   # no acute indication for RRT  # will follow  # prognosis guarded

## 2020-02-18 NOTE — PROGRESS NOTE ADULT - SUBJECTIVE AND OBJECTIVE BOX
DONI PATRICK  75y, Female  Allergy: No Known Allergies      LOS  18d    CHIEF COMPLAINT: SOB and desaturation (18 Feb 2020 08:00)      INTERVAL EVENTS/HPI  - No acute events overnight  - T(F): , Max: 98.6 (02-17-20 @ 20:00)  - Denies any worsening symptoms  - Tolerating medication  - WBC Count: 22.44 (02-18-20 @ 06:07)  WBC Count: 18.87 (02-17-20 @ 16:57)  - Creatinine, Serum: 1.5 (02-18-20 @ 06:07)  Creatinine, Serum: 1.8 (02-17-20 @ 04:55)       ROS  General: Denies rigors, nightsweats  HEENT: Denies headache, rhinorrhea, sore throat, eye pain  CV: Denies CP, palpitations  PULM: Denies wheezing, hemoptysis  GI: Denies hematemesis, hematochezia, melena  : Denies discharge, hematuria  MSK: Denies arthralgias, myalgias  SKIN: Denies rash, lesions  NEURO: Denies paresthesias, weakness  PSYCH: Denies depression, anxiety    VITALS:  T(F): 96.3, Max: 98.6 (02-17-20 @ 20:00)  HR: 94  BP: 169/84  RR: 18Vital Signs Last 24 Hrs  T(C): 35.7 (18 Feb 2020 04:00), Max: 37 (17 Feb 2020 20:00)  T(F): 96.3 (18 Feb 2020 04:00), Max: 98.6 (17 Feb 2020 20:00)  HR: 94 (18 Feb 2020 08:00) (75 - 108)  BP: 169/84 (18 Feb 2020 08:00) (135/72 - 185/88)  BP(mean): 120 (18 Feb 2020 08:00) (93 - 141)  RR: 18 (18 Feb 2020 08:00) (15 - 26)  SpO2: 96% (18 Feb 2020 08:00) (88% - 100%)    PHYSICAL EXAM:  Gen: HFNC  HEENT: Normocephalic, atraumatic  Neck: supple, no lymphadenopathy  CV: Regular rate & regular rhythm  Lungs: decreased BS at bases, no fremitus  Abdomen: Soft, BS present  Ext: Warm, well perfused, anasarca  Neuro: off sedation, opens eyes, alert  Skin: edema/erythema, ecchymoses UE LE, open wound L knee, L wrist  Lines: no phlebitis, L IJ    FH: Non-contributory  Social Hx: Non-contributory    TESTS & MEASUREMENTS:                        9.4    22.44 )-----------( 143      ( 18 Feb 2020 06:07 )             27.5     02-18    146  |  107  |  102<HH>  ----------------------------<  167<H>  3.4<L>   |  22  |  1.5    Ca    7.5<L>      18 Feb 2020 06:07  Phos  4.7     02-18  Mg     2.0     02-18    TPro  4.8<L>  /  Alb  2.7<L>  /  TBili  1.3<H>  /  DBili  x   /  AST  33  /  ALT  69<H>  /  AlkPhos  235<H>  02-18    eGFR if Non African American: 34 mL/min/1.73M2 (02-18-20 @ 06:07)  eGFR if : 39 mL/min/1.73M2 (02-18-20 @ 06:07)    LIVER FUNCTIONS - ( 18 Feb 2020 06:07 )  Alb: 2.7 g/dL / Pro: 4.8 g/dL / ALK PHOS: 235 U/L / ALT: 69 U/L / AST: 33 U/L / GGT: x               Culture - Blood (collected 02-15-20 @ 04:57)  Source: .Blood None  Preliminary Report (02-16-20 @ 18:01):    No growth to date.    Culture - Sputum (collected 02-14-20 @ 17:00)  Source: .Sputum Sputum  Gram Stain (02-15-20 @ 04:56):    Few Squamous epithelial cells per low power field    Few polymorphonuclear leukocytes per low power field    Few Yeast like cells per oil power field    Few Gram variable coccobacilli per oil power field  Final Report (02-16-20 @ 17:44):    Moderate Methicillin resistant Staphylococcus aureus    Normal Respiratory Nikki present  Organism: Methicillin resistant Staphylococcus aureus (02-16-20 @ 17:44)  Organism: Methicillin resistant Staphylococcus aureus (02-16-20 @ 17:44)      -  Ampicillin/Sulbactam: R <=8/4      -  Cefazolin: R <=4      -  Clindamycin: S <=0.25      -  Erythromycin: R >4      -  Gentamicin: S <=1 Should not be used as monotherapy      -  Linezolid: S 2      -  Oxacillin: R >2      -  Penicillin: R >8      -  RIF- Rifampin: S <=1 Should not be used as monotherapy      -  Tetra/Doxy: S <=1      -  Trimethoprim/Sulfamethoxazole: S <=0.5/9.5      -  Vancomycin: S 2      Method Type: EROS    Culture - Blood (collected 02-14-20 @ 04:30)  Source: .Blood Blood-Peripheral  Preliminary Report (02-15-20 @ 14:01):    No growth to date.    Culture - Other (collected 02-13-20 @ 18:55)  Source: .Other wound  Final Report (02-15-20 @ 19:22):    No growth    Culture - Blood (collected 02-12-20 @ 04:50)  Source: .Blood None  Gram Stain (02-13-20 @ 14:24):    Growth in aerobic bottle: Yeast like cells  Preliminary Report (02-14-20 @ 15:27):    Growth in aerobic bottle: Candida albicans    See previous culture [id number]    Culture - Blood (collected 02-11-20 @ 11:21)  Source: .Blood None  Gram Stain (02-12-20 @ 16:48):    Growth in aerobic bottle: Yeast like cells  Final Report (02-15-20 @ 14:05):    Growth in aerobic bottle: Candida albicans    ***Blood Panel PCR results on this specimen are available    approximately 3 hours after the Gram stain result.***    Gram stain, PCR, and/or culture results may not always    correspond due to difference in methodologies.    ************************************************************    This PCR assay was performed using [employer].    The following targets are tested for: Enterococcus,    vancomycin resistant enterococci, Listeria monocytogenes,    coagulase negative staphylococci, S. aureus,    methicillin resistant S. aureus, Streptococcus agalactiae    (Group B), S. pneumoniae, S. pyogenes (Group A),    Acinetobacter baumannii, Enterobacter cloacae, E. coli,    Klebsiella oxytoca, K. pneumoniae, Proteus sp.,    Serratia marcescens, Haemophilus influenzae,    Neisseria meningitidis, Pseudomonas aeruginosa, Candida    albicans, C. glabrata, C krusei, C parapsilosis,    C. tropicalis and the KPC resistance gene.  Organism: Blood Culture PCR  Candida albicans (02-15-20 @ 14:05)  Organism: Candida albicans (02-15-20 @ 14:05)      -  Fluconazole: S 0.25 Fluconazole = Data established for mucosal disease, and is generally applicable for invasive disease.  Susceptibility is dependent on achieving the maximal possible blood level.  Doses of 400 MG/day or more may be required in adults with normal renalfunction and body habitus.      -  Interpretation: See note This test method is not approved by the FDA and is for research use only.  The performance characteristics of this assay may have been determined by [facility] and [facility], [place], [place]                            N/I - No interpretation available                                                         SDD – Sensitive Dose Dependent      Method Type: YSTMIC  Organism: Blood Culture PCR (02-15-20 @ 14:05)      -  Candida albicans: Detec      Method Type: PCR            INFECTIOUS DISEASES TESTING  Fungitell: >500 (02-11-20 @ 11:21)  Fungitell: <31 (02-04-20 @ 04:40)  Streptococcus Pneumoniae Ag Urine: Negative (02-02-20 @ 11:00)  MRSA PCR Result.: Positive (02-01-20 @ 20:40)  Fungitell: 49 (02-01-20 @ 11:17)  Rapid RVP Result: Detected (01-31-20 @ 16:24)  Legionella Antigen, Urine: Negative (01-31-20 @ 15:36)  Streptococcus Pneumoniae Ag Urine: Negative (01-31-20 @ 15:36)  Legionella Antigen, Urine: Negative (01-20-20 @ 02:50)  MRSA PCR Result.: Positive (01-14-20 @ 13:59)  MRSA PCR Result.: Negative (08-13-19 @ 08:46)      RADIOLOGY & ADDITIONAL TESTS:  I have personally reviewed the last available Chest xray  CXR  Xray Chest 1 View- PORTABLE-Urgent:   EXAM:  XR CHEST PORTABLE URGENT 1V            PROCEDURE DATE:  02/17/2020            INTERPRETATION:  Clinical History / Reason for exam: 75-year-old female post nasogastric tube placement.    Comparison:  Chest radiograph performed 2/17/2020 at 1:56 AM.    Technique/Positioning: A single, supine AP radiograph of the chest is submitted..    Findings:    Support devices: A nasogastric tube is present, with tip coiled in the left upper quadrant.  A left jugular vein central venous catheter is stable, with tip at the junction of the left innominate vein and superior vena cava.  An additional line/catheter projects over the right hemithorax, but evaluation is limited on this single, frontal image.    Cardiac/mediastinum/hilum: The cardiac silhouette is magnified.    Lung parenchyma/Pleura: There are stable bilateral diffuse lung opacities with pleural calcifications.  No pneumothorax is seen.    Skeleton/soft tissues: Stable    Impression:    1.  Stable bilateral diffuse lung opacities.  2. Status-post placement of nasogastric catheter, with tip appropriately positioned.                      TONY HERNANDEZ M.D., ATTENDING RADIOLOGIST  This document has been electronically signed. Feb 17 2020  2:03PM             (02-17-20 @ 12:24)      CT      CARDIOLOGY TESTING  Transthoracic Echocardiogram:    EXAM:  2-D ECHO (TTE) COMPLETE        PROCEDURE DATE:  02/15/2020      INTERPRETATION:  REPORT:    TRANSTHORACIC ECHOCARDIOGRAM REPORT         Patient Name:   DONI PATRICK Accession #: [id number]  Medical Rec #:  [id number]     Height:      64.0 in 162.6 cm  YOB: 1945    Weight:      179.7 lb 81.50 kg  Patient Age:    75 years     BSA:         1.87 m²  Patient Gender: F            BP:          135/69 mmHg       Date of Exam:        2/15/2020 9:26:12 AM  Referring Physician: [id number] SILVER  Sonographer:         SAVITA  Fellow:              Braulio Khalil     Reading Physician:   Porfirio Agudelo MD.    Procedure:     2D Echo/Doppler/Color Doppler Complete.  Indications:   I21.3 - ST Elevation STEMI Myocardial Infarction of unspecified  Diagnosis:     ST elevation (STEMI) myocardial infarction of unspecified site -                 I21.3  Study Details: Technically fair study. Study quality was adversely affected due                 to COPD.         Summary:   1. Normal global left ventricular systolic function.   2. LV Ejection Fraction by Caballero's Method with a biplane EF of 62 %.   3. Elevated left atrial and left ventricular end-diastolic pressures.   4. Mild concentric left ventricular hypertrophy.   5. Mildly increased LV wall thickness.   6. Normal left ventricular internal cavity size.   7. Spectral Doppler shows impaired relaxation pattern of left ventricular myocardial filling (Grade I diastolic dysfunction).   8. The mean global longitudinal peak strain by speckle tracking is -15.1% which is reduced.   9. Estimated pulmonary artery systolic pressure is 41.8 mmHg assuming a right atrial pressure of 8 mmHg, which is consistent with mild pulmonary hypertension.  10. Pulmonary hypertension is present.  11. LA volume Index is 20.5 ml/m² ml/m2.    PHYSICIAN INTERPRETATION:  Left Ventricle: The left ventricular internal cavity size is normal. Left ventricular wall thickness is mildly increased. There is mild concentric left ventricular hypertrophy.Global LV systolic function was normal. Spectral Doppler shows impaired relaxation pattern of left ventricular myocardial filling (Grade I diastolic dysfunction). Elevated left atrial and left ventricular end-diastolic pressures. The mean global longitudinal peak strain by speckle tracking is -15.1% which is reduced.       LV Wall Scoring:  All segments are normal.    Right Ventricle: Normal right ventricular size and function.  Left Atrium: Normal left atrial size. LA volume Index is 20.5 ml/m² ml/m2.  Right Atrium: Normal right atrial size.  Pericardium: There is no evidence of pericardial effusion.  Mitral Valve: Structurally normal mitral valve, with normal leaflet excursion. The mitral valve is normal in structure. No evidence of mitral valve regurgitation is seen.  Tricuspid Valve: Structurally normal tricuspid valve, with normal leaflet excursion. The tricuspid valve is normal in structure. Mild tricuspid regurgitation is visualized. Estimated pulmonary artery systolic pressure is 41.8 mmHg assuming a right atrial pressure of 8 mmHg, which is consistent with mild pulmonary hypertension.  Aortic Valve: Normal trileaflet aortic valve with normal opening. The aortic valve is normal. No evidence of aortic valve regurgitation is seen.  Pulmonic Valve: Structurally normal pulmonic valve, with normal leaflet excursion. The pulmonic valve is normal. No indication of pulmonic valve regurgitation.  Aorta: The aortic root and ascending aorta are structurally normal, with no evidence of dilitation.  Pulmonary Artery: The main pulmonary artery is normal in size. Pulmonary hypertension is present.  Venous: The inferior vena cava was normal sized, with respiratory size variation less than 50%.       2D AND M-MODE MEASUREMENTS (normal ranges within parentheses):  Left                  Normal   Aorta/Left             Normal  Ventricle:                     Atrium:  IVSd (2D):  1.19 cm  (0.7-1.1) AoV Cusp       1.66  (1.5-2.6)  LVPWd (2D): 1.12 cm  (0.7-1.1) Separation:     cm  LVIDd (2D): 4.76 cm  (3.4-5.7) Left Atrium    3.22  (1.9-4.0)  LVIDs (2D): 3.41 cm            (Mmode):        cm  LV FS (2D):  28.3 %   (>25%)   Right  IVSd        1.14 cm  (0.7-1.1) Ventricle:  (Mmode):                       RVd (Mmode):   0.81 cm  LVPWd    1.30 cm  (0.7-1.1) RVd (2D):      1.69 cm  (Mmode):                       TAPSE:         1.70 cm  LVIDd       4.99 cm  (3.4-5.7)  (Mmode):  LVIDs       2.95 cm  (Mmode):  LV FS        40.9 %   (>25%)  (Mmode):  Relative      0.47    (<0.42)  Wall  Thickness  Rel. Wall     0.52    (<0.42)  Thickness  Mm  LV Mass      127.2  Index:        g/m²  Mmode    SPECTRAL DOPPLER ANALYSIS:  LV DIASTOLIC FUNCTION:  MV Peak E: 0.88 m/s Decel Time: 167 msec  MV Peak A: 1.02 m/s  E/A Ratio: 0.86    Aortic Valve:  AoV VMax:    1.69 m/s  AoV Area, Vmax: 2.55 cm² Vmax Indx: 1.36 cm²/m²  AoV Pk Grad: 11.4 mmHg    LVOT Vmax: 1.35 m/s  LVOT VTI:  0.26 m  LVOT Diam: 2.02 cm    Mitral Valve:  MV P1/2 Time: 48.29 msec  MV Area, PHT: 4.56 cm²    Tricuspid Valve and PA/RV Systolic Pressure: TR Max Velocity: 2.91 m/s RA Pressure: 8 mmHg RVSP/PASP: 41.8 mmHg       [id number] Porfirio Agudelo MD, Electronically signed on 2/15/2020 at 12:42:31 PM         *** Final ***                    PORFIRIO AGUDELO MD  This document has been electronically signed. Feb 15 2020  9:26AM             (02-15-20 @ 09:26)      MEDICATIONS  albuterol/ipratropium for Nebulization 3  calcitriol  Solution 0.25  calcium carbonate    500 mG (Tums) Chewable 3  caspofungin IVPB 50  chlorhexidine 4% Liquid 1  cyanocobalamin 1000  dexMEDEtomidine Infusion 0.049  dextrose 5%. 1000  gabapentin 300  influenza   Vaccine 0.5  insulin regular Infusion 4  linezolid  IVPB 600  methylPREDNISolone sodium succinate Injectable 80  montelukast 10  multivitamin/minerals 1  mupirocin 2% Ointment 1  pantoprazole   Suspension 40  potassium chloride   Powder 40  tacrolimus 0.5      WEIGHT  Weight (kg): 81.5 (02-03-20 @ 12:15)    ANTIBIOTICS:  caspofungin IVPB 50 milliGRAM(s) IV Intermittent every 24 hours  linezolid  IVPB 600 milliGRAM(s) IV Intermittent every 12 hours      All available historical records have been reviewed

## 2020-02-18 NOTE — PROGRESS NOTE ADULT - SUBJECTIVE AND OBJECTIVE BOX
seen and examined  on high flow o2         PAST HISTORY  --------------------------------------------------------------------------------  No significant changes to PMH, PSH, FHx, SHx, unless otherwise noted    ALLERGIES & MEDICATIONS  --------------------------------------------------------------------------------  Allergies    No Known Allergies    Intolerances      Standing Inpatient Medications  ALBUTerol    90 MICROgram(s) HFA Inhaler 1 Puff(s) Inhalation every 6 hours  calcitriol  Solution 0.25 MICROGram(s) Oral daily  calcium carbonate    500 mG (Tums) Chewable 3 Tablet(s) Chew every 8 hours  caspofungin IVPB 50 milliGRAM(s) IV Intermittent every 24 hours  chlorhexidine 4% Liquid 1 Application(s) Topical <User Schedule>  cyanocobalamin 1000 MICROGram(s) Oral daily  dexMEDEtomidine Infusion 0.049 MICROgram(s)/kG/Hr IV Continuous <Continuous>  dextrose 5%. 1000 milliLiter(s) IV Continuous <Continuous>  gabapentin 300 milliGRAM(s) Oral at bedtime  influenza   Vaccine 0.5 milliLiter(s) IntraMuscular once  insulin regular Infusion 4 Unit(s)/Hr IV Continuous <Continuous>  ipratropium 17 MICROgram(s) HFA Inhaler 1 Puff(s) Inhalation every 6 hours  linezolid  IVPB 600 milliGRAM(s) IV Intermittent every 12 hours  methylPREDNISolone sodium succinate Injectable 125 milliGRAM(s) IV Push every 6 hours  montelukast 10 milliGRAM(s) Oral at bedtime  multivitamin/minerals 1 Tablet(s) Oral daily  mupirocin 2% Ointment 1 Application(s) Topical two times a day  pantoprazole   Suspension 40 milliGRAM(s) Oral daily  potassium chloride   Powder 40 milliEquivalent(s) Oral once  tacrolimus 0.5 milliGRAM(s) Oral every 12 hours    PRN Inpatient Medications  acetaminophen   Tablet .. 650 milliGRAM(s) Oral every 6 hours PRN        VITALS/PHYSICAL EXAM  --------------------------------------------------------------------------------  T(C): 35.7 (02-18-20 @ 04:00), Max: 37 (02-17-20 @ 20:00)  HR: 94 (02-18-20 @ 07:00) (75 - 108)  BP: 172/79 (02-18-20 @ 07:00) (135/72 - 185/88)  RR: 19 (02-18-20 @ 07:00) (15 - 26)  SpO2: 96% (02-18-20 @ 07:00) (88% - 100%)  Wt(kg): --        02-17-20 @ 07:01  -  02-18-20 @ 07:00  --------------------------------------------------------  IN: 1361 mL / OUT: 1515 mL / NET: -154 mL      Physical Exam:  	Gen: NAD  	Pulm: decrease BS  B/L  	CV: S1S2; no rub  	Abd: +distended  	: lory   	LE:  edema      LABS/STUDIES  --------------------------------------------------------------------------------              9.4    22.44 >-----------<  143      [02-18-20 @ 06:07]              27.5     146  |  107  |  102  ----------------------------<  167      [02-18-20 @ 06:07]  3.4   |  22  |  1.5        Ca     7.5     [02-18-20 @ 06:07]      Mg     2.0     [02-18-20 @ 06:07]      Phos  4.7     [02-18-20 @ 06:07]    TPro  4.8  /  Alb  2.7  /  TBili  1.3  /  DBili  x   /  AST  33  /  ALT  69  /  AlkPhos  235  [02-18-20 @ 06:07]    PT/INR: PT 14.60, INR 1.27       [02-17-20 @ 04:55]      Creatinine Trend:  SCr 1.5 [02-18 @ 06:07]  SCr 1.8 [02-17 @ 04:55]  SCr 1.8 [02-16 @ 17:25]  SCr 2.0 [02-16 @ 04:00]  SCr 2.2 [02-15 @ 17:11]    Urinalysis - [02-11-20 @ 19:38]      Color Yellow / Appearance Slightly Turbid / SG 1.012 / pH 6.0      Gluc Negative / Ketone Negative  / Bili Negative / Urobili <2 mg/dL       Blood Moderate / Protein 30 mg/dL / Leuk Est Large / Nitrite Negative      RBC 23 /  / Hyaline 9 / Gran  / Sq Epi  / Non Sq Epi 0 / Bacteria Negative      PTH -- (Ca 7.3)      [02-10-20 @ 18:00]   398  HbA1c 6.9      [03-12-18 @ 04:54]  TSH 0.57      [02-01-20 @ 04:46]  Lipid: chol 203, , HDL 74,       [02-01-20 @ 04:46]      Rheumatoid Factor 59      [02-01-20 @ 04:46]  anti-GBM <1.0      [02-01-20 @ 04:46]  Free Light Chains: kappa 4.52, lambda 3.30, ratio = 1.37      [02-01 @ 04:46]  Immunofixation Serum:   No Monoclonal Band Identified    Reference Range: None Detected      [02-01-20 @ 04:46]  SPEP Interpretation: Normal Electrophoresis Pattern      [02-01-20 @ 04:46]

## 2020-02-18 NOTE — PROGRESS NOTE ADULT - ASSESSMENT
74y female with PMH of asthma, COPD not on home O2, autoimmune hepatitis on prednisone and tacrolimus, heterozygous prothrombin gene mutation with hx of PE and DVT on coumadin presents to the hospital complaining of cough, hemoptysis, SOB and desaturation to 70s. Pt was found to be flu+ likely viral PNA complicated by diffuse alveolar hemorrhage and ARDS . Hospital course complicated by DVT in L saphenous vein with possible DVT and REJI likely secondary to ATN due to vanco.      #ARDS  1-alveolar hemorrhage   2-flu +ve with post viral pneumonia resolved   3-possible PE: cant start AC due to alveolar hemorrhage    - s/p DDAVP 4 doses   - Solumedrol  - Lasix 40   - s/p cefepime and Levaquin (competed 7 days yesterday) - off Vanc >> kidneys toxicity  - completed oseltamivir 30 mg   - C/w Duoneb   - Bronch results - neg culture, neg fungal, cytology positive for inflammatory cells, no organisms  - Repeat Bronch 2/16 showed bleeding from DAH    #Candidemia   - Exchanged CVC  - likely A line is source , found to be foul-smelling and surrounded by pus during removal  - blood cultures grew candida  - cultures neg since 2/14  - daily blood cultures   - sputum cx grew MRSA   - c/w caspofungin and zyvox   - ID following  - trend CBC     #REJI oliguric likely ATN with Vanc toxicity   - acidosis resolved, UO improved   - no need RRT for now  - Nephro following  - PTH elevated     - calcitriol 0.25  - daily BMP, f/u lytes   - BUN and Cr downtrending    # 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  - duplex shows DVT consistent with prior  - might need IVC filter  - not candidate for AC at this time due to alveolar hemorrhage     #Immunosuppressed: Autoimmune Hepatitis   - continue with steroid and tacrolimus  - prednisone 60 mg PO qd at home now on solumedrol   - CMV PCR neg this admission     # HTN  - holding amlodipine 10 and lopressor 50   - Monitor BPs    # heterozygous prothrombin gene mutation with hx of PE and DVT on coumadin  - holding coumadin for now due to alveolar hemorrhage   - monitor coags    #0.5 cm of GB polyp  -needs f/u US in 12 months     # Hx of asthma  - C/w montelukast qd    # GI PPx: Protonix 40 mg PO qd  # DVT PPx: sequentials to right leg only  # Activity: bedrest   # Dispo: ICU  # Code Status: FULL

## 2020-02-18 NOTE — PROGRESS NOTE ADULT - ASSESSMENT
ASSESSMENT  75y/o F w/ hx of asthma, COPD not on home O2, autoimmune hepatitis on prednisone and tacrolimus, heterozygous prothrombin gene mutation with hx of PE and DVT on coumadin with recent hospitalization in January 2020 with a diagnosis of pneumonia presents for worsening SOB, hemoptysis and cough.    IMPRESSION  #MRSA VAP    2/14 tracheal cx   Moderate Methicillin resistant Staphylococcus aureus  #Candidemia Fungitell: >500 (02-11-20 @ 11:21)    2/11 BCX +, 2/12 BCX +, 2/13 BCX (-) RIJ 2/11. Groin a-line 2/4- removed 2/13     Fungitell: <31 (02-04-20 @ 04:40), Fungitell: 49 (02-01-20 @ 11:17)    Ophtho- no endophthalmitis   #Flu + AH1, Alveolar hemorrhage, ?superimposed atypical PNA (imaging not really consistent with bacterial PNA). Recent bronch 1/20 negative for PCP, Fungal, etc, previous bronch cx with yeast (likely oral contaminant) since GMS neg    2/3 Bronch   No organisms seen, 2/3 cytopath Rare atypical cells, few ciliated bronchial cells, alveolar macrophages and inflammatory cells, mainly neutrophils.    Strep urine Ag neg, serum crypto Ag neg, CMV PCR undetectable, AFB neg    Quantiferon indeterminate    Lactate Dehydrogenase, Serum: 607 (02.03.20 @ 04:30)    Procalcitonin, Serum: 1.86:    CTA chest showing no PE but showing interval development of multifocal bilateral groundglass opacities as well as new moderate to severe bronchiectasis in the right lung,   1/13 CT b/l GGOs, compatible with intersitial edema      Serum Pro-Brain Natriuretic Peptide: 722 pg/mL (01.31.20 @ 09:25)<-- Serum Pro-Brain Natriuretic Peptide: 345 pg/mL (01.13.20 @ 12:10)    During last hospitalization: bronch cx bacterial NG, +yeast, pending ID, neg legionella, + MRSA PCR    MRSA PCR Result.: Positive (02-01-20 @ 20:40)  #Severe sepsis on admission P>90, WBC 19, RR>20, lactic acidosis Lactate, Blood: 2.9 mmol/L (01-31-20 @ 09:25)    afebrile   #Elevated Alk ph, transaminitis  #LLE wound, not infection     12/7 WCX MSSA, Kleb, bacteroides    8/2019 MRSA in a wound   #Immunosuppressed: autoimmune hepatitis on prednisone and tacrolimus    RECOMMENDATIONS  - Linezolid 600mg q12h IV (monitor CBC) 2/17-   - Caspo 50mg daily to complete 14 days from cleared BCx end 2/27 (can eventually complete course with PO fluc 400mg daily)  - Trend WBC  - completed oseltamivir & abx s/p cefepime/levaquin  - on methylPREDNISolone sodium succinate Injectable 80    Spectra [phone]

## 2020-02-18 NOTE — PROGRESS NOTE ADULT - SUBJECTIVE AND OBJECTIVE BOX
ELDER, DONI  75y  Female  HPI:  75y/o F w/ hx of asthma, COPD not on home O2, autoimmune hepatitis on prednisone and tacrolimus, heterozygous prothrombin gene mutation with hx of PE and DVT on coumadin with recent hospitalization in January 2020 with a diagnosis of pneumonia presents for worsening SOB, hemoptysis and cough. Everything started yesterday night when patient was in bed, started to feel shortness of breath and had many episodes of hemoptysis with clots, she also had substernal chest pain non radiating, moderate in intensity that worsens on coughing. She denies fever but endorses chills. She also admits for lightheadedness that occurred yesterday, no HA, abd pain, dysuria or paresthesias. She had 2 loose bowel movements since yesterday with some blood in the stools that she attributes to her hemorrhoids. She stopped Coumadin couple of days ago since her INR was supratherapeutic. She is from [facility] short term and also mentions that her  and another family member have the flu and she was exposed to them. She did not have the flu shot this season.  In ED, temp was 100.1 rectally, tachycardic ( sinus) , she was saturating to 70s on non rebreather and her SaO2 went up to 95%. ABG showing respiratory alkalosis initially and then corrected after BIPAP placement and correction of RR.  CTA chest showing no PE but showing interval development of multifocal bilateral groundglass opacities as well as new moderate to severe bronchiectasis in the right lung. Findings are concerning for an acute infectious/inflammatory process. (31 Jan 2020 14:36)    MEDICATIONS  (STANDING):  albuterol/ipratropium for Nebulization 3 milliLiter(s) Nebulizer every 6 hours  calcitriol  Solution 0.25 MICROGram(s) Oral daily  calcium carbonate    500 mG (Tums) Chewable 3 Tablet(s) Chew every 8 hours  caspofungin IVPB 50 milliGRAM(s) IV Intermittent every 24 hours  chlorhexidine 4% Liquid 1 Application(s) Topical <User Schedule>  cyanocobalamin 1000 MICROGram(s) Oral daily  gabapentin 300 milliGRAM(s) Oral at bedtime  influenza   Vaccine 0.5 milliLiter(s) IntraMuscular once  insulin regular Infusion 4 Unit(s)/Hr (4 mL/Hr) IV Continuous <Continuous>  linezolid  IVPB 600 milliGRAM(s) IV Intermittent every 12 hours  methylPREDNISolone sodium succinate Injectable 80 milliGRAM(s) IV Push every 6 hours  montelukast 10 milliGRAM(s) Oral at bedtime  multivitamin/minerals 1 Tablet(s) Oral daily  mupirocin 2% Ointment 1 Application(s) Topical two times a day  pantoprazole   Suspension 40 milliGRAM(s) Oral daily    MEDICATIONS  (PRN):  acetaminophen   Tablet .. 650 milliGRAM(s) Oral every 6 hours PRN Temp greater or equal to 38C (100.4F)  oxyCODONE    IR 10 milliGRAM(s) Oral every 8 hours PRN Severe Pain (7 - 10)    INTERVAL EVENTS: Patient seen today more alert, attempting to verbalize, able to say my name, follow commands, extremely weak movements.    T(C): 35.4 (02-18-20 @ 20:00), Max: 36.8 (02-18-20 @ 11:00)  HR: 96 (02-18-20 @ 20:00) (66 - 100)  BP: 153/71 (02-18-20 @ 20:00) (122/67 - 185/88)  RR: 37 (02-18-20 @ 20:00) (11 - 37)  SpO2: 98% (02-18-20 @ 20:00) (88% - 99%)  Wt(kg): --Vital Signs Last 24 Hrs  T(C): 35.4 (18 Feb 2020 20:00), Max: 36.8 (18 Feb 2020 11:00)  T(F): 95.8 (18 Feb 2020 20:00), Max: 98.3 (18 Feb 2020 11:00)  HR: 96 (18 Feb 2020 20:00) (66 - 100)  BP: 153/71 (18 Feb 2020 20:00) (122/67 - 185/88)  BP(mean): 108 (18 Feb 2020 20:00) (97 - 141)  RR: 37 (18 Feb 2020 20:00) (11 - 37)  SpO2: 98% (18 Feb 2020 20:00) (88% - 99%)    PHYSICAL EXAM:  GENERAL: NAD, Weak,   NECK: Supple, No JVD, Normal thyroid  CHEST/LUNG: Clear; No cracklwheezing  HEART: S1, S2, Regular rate and rhythm;   ABDOMEN: Soft, Nontender, Nondistended; Bowel sounds present  EXTREMITIES: No clubbing, cyanosis, or edema  SKIN: No rashes or lesions    LABS:    Culture - Blood (collected 17 Feb 2020 05:17)  Source: .Blood None  Preliminary Report (18 Feb 2020 14:02):    No growth to date.      RADIOLOGY & ADDITIONAL TESTS: ELDER, DONI  75y  Female  HPI:  75y/o F w/ hx of asthma, COPD not on home O2, autoimmune hepatitis on prednisone and tacrolimus, heterozygous prothrombin gene mutation with hx of PE and DVT on coumadin with recent hospitalization in January 2020 with a diagnosis of pneumonia presents for worsening SOB, hemoptysis and cough. Everything started yesterday night when patient was in bed, started to feel shortness of breath and had many episodes of hemoptysis with clots, she also had substernal chest pain non radiating, moderate in intensity that worsens on coughing. She denies fever but endorses chills. She also admits for lightheadedness that occurred yesterday, no HA, abd pain, dysuria or paresthesias. She had 2 loose bowel movements since yesterday with some blood in the stools that she attributes to her hemorrhoids. She stopped Coumadin couple of days ago since her INR was supratherapeutic. She is from [facility] short term and also mentions that her  and another family member have the flu and she was exposed to them. She did not have the flu shot this season.  In ED, temp was 100.1 rectally, tachycardic ( sinus) , she was saturating to 70s on non rebreather and her SaO2 went up to 95%. ABG showing respiratory alkalosis initially and then corrected after BIPAP placement and correction of RR.  CTA chest showing no PE but showing interval development of multifocal bilateral groundglass opacities as well as new moderate to severe bronchiectasis in the right lung. Findings are concerning for an acute infectious/inflammatory process. (31 Jan 2020 14:36)    MEDICATIONS  (STANDING):  albuterol/ipratropium for Nebulization 3 milliLiter(s) Nebulizer every 6 hours  calcitriol  Solution 0.25 MICROGram(s) Oral daily  calcium carbonate    500 mG (Tums) Chewable 3 Tablet(s) Chew every 8 hours  caspofungin IVPB 50 milliGRAM(s) IV Intermittent every 24 hours  chlorhexidine 4% Liquid 1 Application(s) Topical <User Schedule>  cyanocobalamin 1000 MICROGram(s) Oral daily  gabapentin 300 milliGRAM(s) Oral at bedtime  influenza   Vaccine 0.5 milliLiter(s) IntraMuscular once  insulin regular Infusion 4 Unit(s)/Hr (4 mL/Hr) IV Continuous <Continuous>  linezolid  IVPB 600 milliGRAM(s) IV Intermittent every 12 hours  methylPREDNISolone sodium succinate Injectable 80 milliGRAM(s) IV Push every 6 hours  montelukast 10 milliGRAM(s) Oral at bedtime  multivitamin/minerals 1 Tablet(s) Oral daily  mupirocin 2% Ointment 1 Application(s) Topical two times a day  pantoprazole   Suspension 40 milliGRAM(s) Oral daily    MEDICATIONS  (PRN):  acetaminophen   Tablet .. 650 milliGRAM(s) Oral every 6 hours PRN Temp greater or equal to 38C (100.4F)  oxyCODONE    IR 10 milliGRAM(s) Oral every 8 hours PRN Severe Pain (7 - 10)    INTERVAL EVENTS: Patient seen today more alert, attempting to verbalize, able to say my name, follow commands, extremely weak movements.    T(C): 35.4 (02-18-20 @ 20:00), Max: 36.8 (02-18-20 @ 11:00)  HR: 96 (02-18-20 @ 20:00) (66 - 100)  BP: 153/71 (02-18-20 @ 20:00) (122/67 - 185/88)  RR: 37 (02-18-20 @ 20:00) (11 - 37)  SpO2: 98% (02-18-20 @ 20:00) (88% - 99%)  Wt(kg): --Vital Signs Last 24 Hrs  T(C): 35.4 (18 Feb 2020 20:00), Max: 36.8 (18 Feb 2020 11:00)  T(F): 95.8 (18 Feb 2020 20:00), Max: 98.3 (18 Feb 2020 11:00)  HR: 96 (18 Feb 2020 20:00) (66 - 100)  BP: 153/71 (18 Feb 2020 20:00) (122/67 - 185/88)  BP(mean): 108 (18 Feb 2020 20:00) (97 - 141)  RR: 37 (18 Feb 2020 20:00) (11 - 37)  SpO2: 98% (18 Feb 2020 20:00) (88% - 99%)    PHYSICAL EXAM:  GENERAL: NAD, Weak, Hi-flow O2 in place  NECK: Supple, No JVD  CHEST/LUNG: + wheezing  HEART: S1, S2, Regular rate and rhythm  ABDOMEN: Soft, Nontender? Bowel sounds present  EXTREMITIES: ++ edema  SKIN: Bruising, Open Skin tears    LABS:               9.4    22.44 )-----------( 143      ( 18 Feb 2020 06:07 )             27.5             02-18    146  |  107  |  102<HH>  ----------------------------<  167<H>  3.4<L>   |  22  |  1.5    Ca    7.5<L>      18 Feb 2020 06:07  Phos  4.7     02-18  Mg     2.0     02-18    TPro  4.8<L>  /  Alb  2.7<L>  /  TBili  1.3<H>  /  DBili  x   /  AST  33  /  ALT  69<H>  /  AlkPhos  235<H>  02-18    LIVER FUNCTIONS - ( 18 Feb 2020 06:07 )  Alb: 2.7 g/dL / Pro: 4.8 g/dL / ALK PHOS: 235 U/L / ALT: 69 U/L / AST: 33 U/L / GGT: x                   PT/INR - ( 17 Feb 2020 04:55 )   PT: 14.60 sec;   INR: 1.27 ratio      Culture - Blood (collected 17 Feb 2020 05:17)  Source: .Blood None  Preliminary Report (18 Feb 2020 14:02):    No growth to date.      RADIOLOGY & ADDITIONAL TESTS:  < from: Xray Chest 1 View-PORTABLE IMMEDIATE (02.18.20 @ 09:34) >  Findings:    Support devices: Telemetry leads overlie the thorax. Enteric catheter extends below the hemidiaphragm. There is a left-sided central venous catheter.    Cardiac/mediastinum/hilum: The heart size is enlarged.    Lung parenchyma/Pleura: Calcified pleural plaques with superimposed interstitial opacifications.    Skeleton/soft tissues: Unchanged.    Impression:      Acute on chronic lung disease.    Support devices as described.    < end of copied text >

## 2020-02-18 NOTE — PROGRESS NOTE ADULT - SUBJECTIVE AND OBJECTIVE BOX
Hospital Day:  18d    Subjective:    Pt was interviewed and examined at the bedside in the AM. No acute events overnight.   Pt appears more awake today. Follows commands appropriately.       Past Medical Hx:   Prothrombin gene mutation  Autoimmune hepatitis  Other chronic pulmonary embolism without acute cor pulmonale  Essential hypertension  Autoimmune hepatitis treated with steroids  Asthma  DVT (deep venous thrombosis)    Past Sx:  S/P debridement  History of back surgery  No significant past surgical history    Allergies:  No Known Allergies    Current Meds:   Standng Meds:  albuterol/ipratropium for Nebulization 3 milliLiter(s) Nebulizer every 6 hours  calcitriol  Solution 0.25 MICROGram(s) Oral daily  calcium carbonate    500 mG (Tums) Chewable 3 Tablet(s) Chew every 8 hours  caspofungin IVPB 50 milliGRAM(s) IV Intermittent every 24 hours  chlorhexidine 4% Liquid 1 Application(s) Topical <User Schedule>  cyanocobalamin 1000 MICROGram(s) Oral daily  dextrose 5%. 1000 milliLiter(s) (50 mL/Hr) IV Continuous <Continuous>  gabapentin 300 milliGRAM(s) Oral at bedtime  influenza   Vaccine 0.5 milliLiter(s) IntraMuscular once  insulin regular Infusion 4 Unit(s)/Hr (4 mL/Hr) IV Continuous <Continuous>  linezolid  IVPB 600 milliGRAM(s) IV Intermittent every 12 hours  methylPREDNISolone sodium succinate Injectable 80 milliGRAM(s) IV Push every 6 hours  montelukast 10 milliGRAM(s) Oral at bedtime  multivitamin/minerals 1 Tablet(s) Oral daily  mupirocin 2% Ointment 1 Application(s) Topical two times a day  pantoprazole   Suspension 40 milliGRAM(s) Oral daily  tacrolimus 0.5 milliGRAM(s) Oral every 12 hours    PRN Meds:  acetaminophen   Tablet .. 650 milliGRAM(s) Oral every 6 hours PRN Temp greater or equal to 38C (100.4F)  oxyCODONE    IR 10 milliGRAM(s) Oral every 8 hours PRN Severe Pain (7 - 10)    HOME MEDICATIONS:  acetaminophen 500 mg oral tablet: 2 tab(s) orally every 8 hours  amLODIPine 10 mg oral tablet: 1 tab(s) orally once a day (at bedtime)  budesonide 3 mg oral capsule, extended release: 1 cap(s) orally 2 times a day  ferrous sulfate 325 mg (65 mg elemental iron) oral delayed release tablet: 1 tab(s) orally once a day  furosemide 20 mg oral tablet: 1 tab(s) orally once a day  gabapentin 300 mg oral capsule: 1 cap(s) orally once a day (at bedtime)  ibuprofen 400 mg oral tablet: 1 tab(s) orally every 6 hours, As needed, Moderate Pain (4 - 6)  ipratropium-albuterol 0.5 mg-2.5 mg/3 mLinhalation solution: 3 milliliter(s) inhaled every 6 hours, As Needed  lidocaine 5% topical film: Apply topically to affected area once a day  Metoprolol Tartrate 50 mg oral tablet: 1 tab(s) orally once a day  montelukast 10 mg oral tablet: 1 tab(s) orally once a day (at bedtime)  Multiple Vitamins with Minerals oral tablet: 1 tab(s) orally once a day  oxyCODONE 10 mg oral tablet: 1 tab(s) orally every 6 hours, As needed, Severe Pain (7 - 10)  predniSONE 20 mg oral tablet: 3 tab(s) orally once a day  ProAir HFA 90 mcg/inh inhalation aerosol: 1 puff(s) inhaled every 4 hours, As Needed  risedronate 150 mg oral tablet: 1 tab(s) orally once a month  tacrolimus 0.5 mg oral capsule: 1 cap(s) orally every 12 hours  tiotropium 18 mcg inhalation capsule: 1 cap(s) inhaled once a day, As Needed  Vitamin B12 1000 mcg oral tablet: 1 tab(s) orally once a day  Vitamin C 1000 mg oral tablet: 1 tab(s) orally once a day  warfarin 4 mg oral tablet: 1 tab(s) orally once a day (at bedtime)      Vital Signs:   T(F): 97 (02-18-20 @ 08:00), Max: 98.6 (02-17-20 @ 20:00)  HR: 88 (02-18-20 @ 10:00) (75 - 108)  BP: 149/72 (02-18-20 @ 10:00) (137/70 - 185/88)  RR: 18 (02-18-20 @ 10:00) (15 - 26)  SpO2: 97% (02-18-20 @ 10:00) (88% - 100%)      02-17-20 @ 07:01  -  02-18-20 @ 07:00  --------------------------------------------------------  IN: 1361 mL / OUT: 1515 mL / NET: -154 mL        Physical Exam:   CONSTITUTIONAL: NAD, lethargic   HEAD: Normocephalic; atraumatic, NG+, on high flow   EYES: PERRL, EOMI, no conjunctival erythema  CARD: +S1, S2 Regular rate and rhythm, tachy  RESP: b/l ronchi  ABD: soft nontender, distended, no rebound, no guarding, no rigidity,   EXT: edema on limbs, wounds on b/l arms   NEURO: opens eyes, does follows commands, to weak to move limbs   Lines: L IJ       Labs:                         9.4    22.44 )-----------( 143      ( 18 Feb 2020 06:07 )             27.5     Neutophil% 96.2, Lymphocyte% 1.4, Monocyte% 1.2, Bands% 1.0 02-18-20 @ 06:07    18 Feb 2020 06:07    146    |  107    |  102    ----------------------------<  167    3.4     |  22     |  1.5      Ca    7.5        18 Feb 2020 06:07  Phos  4.7       18 Feb 2020 06:07  Mg     2.0       18 Feb 2020 06:07    TPro  4.8    /  Alb  2.7    /  TBili  1.3    /  DBili  x      /  AST  33     /  ALT  69     /  AlkPhos  235    18 Feb 2020 06:07    Culture - Blood (collected 02-15-20 @ 04:57)  Source: .Blood None  Preliminary Report (02-16-20 @ 18:01):    No growth to date.    Culture - Blood (collected 02-14-20 @ 04:30)  Source: .Blood Blood-Peripheral  Preliminary Report (02-15-20 @ 14:01):    No growth to date.    Culture - Blood (collected 02-12-20 @ 04:50)  Source: .Blood None  Gram Stain (02-13-20 @ 14:24):    Growth in aerobic bottle: Yeast like cells  Preliminary Report (02-14-20 @ 15:27):    Growth in aerobic bottle: Candida albicans    See previous culture [id number]    Culture - Blood (collected 02-11-20 @ 11:21)  Source: .Blood None  Gram Stain (02-12-20 @ 16:48):    Growth in aerobic bottle: Yeast like cells  Final Report (02-15-20 @ 14:05):    Growth in aerobic bottle: Candida albicans    ***Blood Panel PCR results on this specimen are available    approximately 3 hours after the Gram stain result.***    Gram stain, PCR, and/or culture results may not always    correspond due to difference in methodologies.    ************************************************************    This PCR assay was performed using [employer].    The following targets are tested for: Enterococcus,    vancomycin resistant enterococci, Listeria monocytogenes,    coagulase negative staphylococci, S. aureus,    methicillin resistant S. aureus, Streptococcus agalactiae    (Group B), S. pneumoniae, S. pyogenes (Group A),    Acinetobacter baumannii, Enterobacter cloacae, E. coli,    Klebsiella oxytoca, K. pneumoniae, Proteus sp.,    Serratia marcescens, Haemophilus influenzae,    Neisseria meningitidis, Pseudomonas aeruginosa, Candida    albicans, C. glabrata, C krusei, C parapsilosis,    C. tropicalis and the KPC resistance gene.  Organism: Blood Culture PCR  Candida albicans (02-15-20 @ 14:05)  Organism: Candida albicans (02-15-20 @ 14:05)      -  Fluconazole: S 0.25 Fluconazole = Data established for mucosal disease, and is generally applicable for invasive disease.  Susceptibility is dependent on achieving the maximal possible blood level.  Doses of 400 MG/day or more may be required in adults with normal renalfunction and body habitus.      -  Interpretation: See note This test method is not approved by the FDA and is for research use only.  The performance characteristics of this assay may have been determined by [employer] and [facility], [place], [place]                            N/I - No interpretation available                                                         SDD – Sensitive Dose Dependent      Method Type: [facility]  Organism: Blood Culture PCR (02-15-20 @ 14:05)      -  Candida albicans: Detec      Method Type: PCR        Radiology:

## 2020-02-18 NOTE — PROGRESS NOTE ADULT - ASSESSMENT
IMPRESSION:    Acute hypoxic respiratory failure   DAH resolved   Influenza A treated   ARDS  Candidemia   REJI improving   HO Autoimmune hepatitis on tacrolimus and chronic steroids   h/o hypercoagulable state/ vte was on coumadin   Left great saphenous vein thrombosis chronic    PLAN:    CNS:  Keep off sedation.  FU MS.      HEENT: ET care.  Oral care.      PULMONARY:  HOB @ 45 degrees. lower Solumedrol 80 mg q6 hrs for now. Wean O2. Aspiration precautions.    low threshold for intubation    NIV  at night and as needed   CARDIOVASCULAR:  I=O.  D5 W 50 cc per hour start NG feed   free water 250 cc Q 4 hrs     GI: GI prophylaxis.  NG feeding    RENAL:  Follow up lytes. Replete as needed.  FU with Renal.  FU lytes 6 pm  BMP afternoon if NA going down d/c d5w   INFECTIOUS DISEASE: Follow up cultures.  Continue Anti fungal. Zyvox     HEMATOLOGICAL:  DVT prophylaxis.  fu cbc     ENDOCRINE:  Follow up FS.  Insulin protocol if needed.    MUSCULOSKELETAL: Bed rest     d/c henley   bed in chair Mode     Code status: full     Prognosis: Poor prognosis     Continue MICU monitoring for now.

## 2020-02-18 NOTE — PROGRESS NOTE ADULT - ASSESSMENT
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   - weaned from vent => back on Hi flow NC  - remains on IV Solu-medrol  - antibiotics and antiviral completed for initial infection  - now with MRSA VAP  - continue Duoneb q6h and q4h PRN  - ID and pulm f/u appreciated  - started on Zyvox    Candedemia  - central line change noted  - cultures negative x2  - 2D echo no evidence of vegatation  - ophth evaluation noted, no clinical evidence, low suspicion for ocular fungemia/endogenous endophthalmitis   - on Caspofungin  as per ID recommendations    REJI on CKD 3  - creatinine trending down  - renal f/u noted  - urine output noted  - continue to monitor  - follow renal recommendations    Autoimmune hepatitis  - On prednisone 60 mg PO qd and Tacrolimus at home  - now on Solu-medrol  - Tacrolimus held?    HTN  - now off Labetolol    Heterozygous prothrombin gene mutation with hx of PE and DVT on coumadin:  - Coumadin had been held for suspected alveolar hemorrhage     Hx of asthma  - on Montelukast qd    Multiple Skin tears/ wounds  - local care    Acute blood loss, Lower GI bleed  - GI noted  - H/H noted  - rectal tube discontinued    Altered MS, weakness  - neuro followup noted  - EEG negative  - MRI of brain ordered  - ? steroid myopathy    Diet: Enteral feedings resolved, Patient has a new feeding tube  GI PPx: Protonix 40 mg PO qd  DVT PPx: sequentials  Activity: bedrest  Dispo: readmitted to hospital from  rehab at [facility], ICU for now     Continue supportive measures, patient remains acutely ill,  with poor mental status. Overall prognosis poor.

## 2020-02-19 NOTE — PROGRESS NOTE ADULT - SUBJECTIVE AND OBJECTIVE BOX
Nephrology progress note    THIS IS AN INCOMPLETE NOTE . FULL NOTE TO FOLLOW SHORTLY    Patient is seen and examined, events over the last 24 h noted .    Allergies:  No Known Allergies    Hospital Medications:   MEDICATIONS  (STANDING):  albuterol/ipratropium for Nebulization 3 milliLiter(s) Nebulizer every 6 hours  calcitriol  Solution 0.25 MICROGram(s) Oral daily  calcium carbonate    500 mG (Tums) Chewable 3 Tablet(s) Chew every 8 hours  caspofungin IVPB 50 milliGRAM(s) IV Intermittent every 24 hours  chlorhexidine 4% Liquid 1 Application(s) Topical <User Schedule>  cyanocobalamin 1000 MICROGram(s) Oral daily  dextrose 5%. 1000 milliLiter(s) (50 mL/Hr) IV Continuous <Continuous>  gabapentin 300 milliGRAM(s) Oral at bedtime  influenza   Vaccine 0.5 milliLiter(s) IntraMuscular once  insulin regular Infusion 4 Unit(s)/Hr (4 mL/Hr) IV Continuous <Continuous>  linezolid  IVPB 600 milliGRAM(s) IV Intermittent every 12 hours  methylPREDNISolone sodium succinate Injectable 80 milliGRAM(s) IV Push every 6 hours  montelukast 10 milliGRAM(s) Oral at bedtime  multivitamin/minerals 1 Tablet(s) Oral daily  mupirocin 2% Ointment 1 Application(s) Topical two times a day  pantoprazole   Suspension 40 milliGRAM(s) Oral daily        VITALS:  T(F): 97.6 (02-19-20 @ 08:00), Max: 98.3 (02-18-20 @ 11:00)  HR: 90 (02-19-20 @ 08:00)  BP: 149/69 (02-19-20 @ 08:00)  RR: 24 (02-19-20 @ 08:00)  SpO2: 96% (02-19-20 @ 08:00)  Wt(kg): --    02-17 @ 07:01  -  02-18 @ 07:00  --------------------------------------------------------  IN: 1361 mL / OUT: 1515 mL / NET: -154 mL    02-18 @ 07:01  -  02-19 @ 07:00  --------------------------------------------------------  IN: 2345 mL / OUT: 975 mL / NET: 1370 mL    02-19 @ 07:01  -  02-19 @ 08:53  --------------------------------------------------------  IN: 50 mL / OUT: 100 mL / NET: -50 mL          PHYSICAL EXAM:  Constitutional: NAD  HEENT: anicteric sclera, oropharynx clear, MMM  Neck: No JVD  Respiratory: CTAB, no wheezes, rales or rhonchi  Cardiovascular: S1, S2, RRR  Gastrointestinal: BS+, soft, NT/ND  Extremities: No cyanosis or clubbing. No peripheral edema  :  No henley.   Skin: No rashes    LABS:  02-19    149<H>  |  110  |  89<HH>  ----------------------------<  87  3.5   |  24  |  1.3    Ca    7.8<L>      19 Feb 2020 06:15  Phos  4.7     02-18  Mg     1.8     02-19    TPro  4.8<L>  /  Alb  2.7<L>  /  TBili  0.9  /  DBili      /  AST  29  /  ALT  56<H>  /  AlkPhos  229<H>  02-19                          8.9    20.11 )-----------( 126      ( 19 Feb 2020 06:15 )             27.2       Urine Studies:      RADIOLOGY & ADDITIONAL STUDIES: Nephrology progress note  Patient is seen and examined, events over the last 24 h noted .  extubated lethargic   no other events     Allergies:  No Known Allergies    Hospital Medications:   MEDICATIONS  (STANDING):  albuterol/ipratropium for Nebulization 3 milliLiter(s) Nebulizer every 6 hours  calcitriol  Solution 0.25 MICROGram(s) Oral daily  calcium carbonate    500 mG (Tums) Chewable 3 Tablet(s) Chew every 8 hours  caspofungin IVPB 50 milliGRAM(s) IV Intermittent every 24 hours  cyanocobalamin 1000 MICROGram(s) Oral daily  dextrose 5%. 1000 milliLiter(s) (50 mL/Hr) IV Continuous <Continuous>  gabapentin 300 milliGRAM(s) Oral at bedtime  influenza   Vaccine 0.5 milliLiter(s) IntraMuscular once  insulin regular Infusion 4 Unit(s)/Hr (4 mL/Hr) IV Continuous <Continuous>  linezolid  IVPB 600 milliGRAM(s) IV Intermittent every 12 hours  methylPREDNISolone sodium succinate Injectable 80 milliGRAM(s) IV Push every 6 hours  montelukast 10 milliGRAM(s) Oral at bedtime  multivitamin/minerals 1 Tablet(s) Oral daily  mupirocin 2% Ointment 1 Application(s) Topical two times a day  pantoprazole   Suspension 40 milliGRAM(s) Oral daily        VITALS:  T(F): 97.6 (02-19-20 @ 08:00), Max: 98.3 (02-18-20 @ 11:00)  HR: 90 (02-19-20 @ 08:00)  BP: 149/69 (02-19-20 @ 08:00)  RR: 24 (02-19-20 @ 08:00)  SpO2: 96% (02-19-20 @ 08:00)      02-17 @ 07:01  -  02-18 @ 07:00  --------------------------------------------------------  IN: 1361 mL / OUT: 1515 mL / NET: -154 mL    02-18 @ 07:01  -  02-19 @ 07:00  --------------------------------------------------------  IN: 2345 mL / OUT: 975 mL / NET: 1370 mL    02-19 @ 07:01  -  02-19 @ 08:53  --------------------------------------------------------  IN: 50 mL / OUT: 100 mL / NET: -50 mL          PHYSICAL EXAM:  Constitutional: lethargic   HEENT: anicteric sclera, oropharynx clear, MMM  Neck: No JVD  Respiratory: CTAB, no wheezes, rales or rhonchi  Cardiovascular: S1, S2, RRR  Gastrointestinal: BS+, soft, NT/ND  Extremities: No cyanosis or clubbing. No peripheral edema  :  No henley.       LABS:  02-19    149<H>  |  110  |  89<HH>  ----------------------------<  87  3.5   |  24  |  1.3    SODIUM TREND:  Sodium 149 [02-19 @ 06:15]  Sodium 147 [02-19 @ 00:30]  Sodium 146 [02-18 @ 06:07]  Sodium 143 [02-17 @ 04:55]  Sodium 147 [02-16 @ 17:25]  Sodium 145 [02-16 @ 04:00]  Sodium 147 [02-15 @ 17:11]  Sodium 152 [02-15 @ 04:30]  Sodium 155 [02-14 @ 23:30]  Sodium 151 [02-14 @ 04:50]    Ca    7.8<L>      19 Feb 2020 06:15  Phos  4.7     02-18  Mg     1.8     02-19    TPro  4.8<L>  /  Alb  2.7<L>  /  TBili  0.9  /  DBili      /  AST  29  /  ALT  56<H>  /  AlkPhos  229<H>  02-19                          8.9    20.11 )-----------( 126      ( 19 Feb 2020 06:15 )             27.2       Urine Studies:      RADIOLOGY & ADDITIONAL STUDIES:  < from: Xray Chest 1 View- PORTABLE-Routine (02.19.20 @ 04:56) >  IMPRESSION:     Unchanged bilateral opacities/effusions.    < end of copied text >

## 2020-02-19 NOTE — PROGRESS NOTE ADULT - SUBJECTIVE AND OBJECTIVE BOX
DONI PATRICK  75y, Female  Allergy: No Known Allergies      LOS  19d    CHIEF COMPLAINT: SOB and desaturation (19 Feb 2020 08:53)      INTERVAL EVENTS/HPI  - No acute events overnight  - T(F): , Max: 98.3 (02-18-20 @ 11:00)  - Tolerating medication  - WBC Count: 20.11 (02-19-20 @ 06:15)  WBC Count: 22.44 (02-18-20 @ 06:07)  - Creatinine, Serum: 1.3 (02-19-20 @ 06:15)  Creatinine, Serum: 1.4 (02-19-20 @ 00:30)       ROS  General: Denies rigors, nightsweats  HEENT: Denies headache, rhinorrhea, sore throat, eye pain  CV: Denies CP, palpitations  PULM: Denies wheezing, hemoptysis  GI: Denies hematemesis, hematochezia, melena  : Denies discharge, hematuria  MSK: Denies arthralgias, myalgias  SKIN: Denies rash, lesions  NEURO: Denies paresthesias, weakness  PSYCH: Denies depression, anxiety    VITALS:  T(F): 97.6, Max: 98.3 (02-18-20 @ 11:00)  HR: 90  BP: 149/69  RR: 24Vital Signs Last 24 Hrs  T(C): 36.4 (19 Feb 2020 08:00), Max: 36.8 (18 Feb 2020 11:00)  T(F): 97.6 (19 Feb 2020 08:00), Max: 98.3 (18 Feb 2020 11:00)  HR: 90 (19 Feb 2020 08:00) (66 - 122)  BP: 149/69 (19 Feb 2020 08:00) (105/51 - 179/78)  BP(mean): 109 (19 Feb 2020 08:00) (73 - 141)  RR: 24 (19 Feb 2020 08:00) (10 - 37)  SpO2: 96% (19 Feb 2020 08:00) (92% - 99%)    PHYSICAL EXAM:  Gen: HFNC  HEENT: Normocephalic, atraumatic  Neck: supple, no lymphadenopathy  CV: Regular rate & regular rhythm  Lungs: decreased BS at bases, no fremitus  Abdomen: Soft, BS present  Ext: Warm, well perfused, anasarca  Neuro: nonfocal  Skin: edema/erythema, ecchymoses UE LE, open wound L knee, L wrist  Lines: no phlebitis, L IJ      FH: Non-contributory  Social Hx: Non-contributory    TESTS & MEASUREMENTS:                        8.9    20.11 )-----------( 126      ( 19 Feb 2020 06:15 )             27.2     02-19    149<H>  |  110  |  89<HH>  ----------------------------<  87  3.5   |  24  |  1.3    Ca    7.8<L>      19 Feb 2020 06:15  Phos  4.7     02-18  Mg     1.8     02-19    TPro  4.8<L>  /  Alb  2.7<L>  /  TBili  0.9  /  DBili  x   /  AST  29  /  ALT  56<H>  /  AlkPhos  229<H>  02-19    eGFR if Non African American: 40 mL/min/1.73M2 (02-19-20 @ 06:15)  eGFR if African American: 46 mL/min/1.73M2 (02-19-20 @ 06:15)  eGFR if Non African American: 37 mL/min/1.73M2 (02-19-20 @ 00:30)  eGFR if African American: 42 mL/min/1.73M2 (02-19-20 @ 00:30)    LIVER FUNCTIONS - ( 19 Feb 2020 06:15 )  Alb: 2.7 g/dL / Pro: 4.8 g/dL / ALK PHOS: 229 U/L / ALT: 56 U/L / AST: 29 U/L / GGT: x               Culture - Blood (collected 02-17-20 @ 05:17)  Source: .Blood None  Preliminary Report (02-18-20 @ 14:02):    No growth to date.    Culture - Blood (collected 02-15-20 @ 04:57)  Source: .Blood None  Preliminary Report (02-16-20 @ 18:01):    No growth to date.    Culture - Sputum (collected 02-14-20 @ 17:00)  Source: .Sputum Sputum  Gram Stain (02-15-20 @ 04:56):    Few Squamous epithelial cells per low power field    Few polymorphonuclear leukocytes per low power field    Few Yeast like cells per oil power field    Few Gram variable coccobacilli per oil power field  Final Report (02-16-20 @ 17:44):    Moderate Methicillin resistant Staphylococcus aureus    Normal Respiratory Nikki present  Organism: Methicillin resistant Staphylococcus aureus (02-16-20 @ 17:44)  Organism: Methicillin resistant Staphylococcus aureus (02-16-20 @ 17:44)      -  Ampicillin/Sulbactam: R <=8/4      -  Cefazolin: R <=4      -  Clindamycin: S <=0.25      -  Erythromycin: R >4      -  Gentamicin: S <=1 Should not be used as monotherapy      -  Linezolid: S 2      -  Oxacillin: R >2      -  Penicillin: R >8      -  RIF- Rifampin: S <=1 Should not be used as monotherapy      -  Tetra/Doxy: S <=1      -  Trimethoprim/Sulfamethoxazole: S <=0.5/9.5      -  Vancomycin: S 2      Method Type: EROS    Culture - Blood (collected 02-14-20 @ 04:30)  Source: .Blood Blood-Peripheral  Preliminary Report (02-15-20 @ 14:01):    No growth to date.    Culture - Other (collected 02-13-20 @ 18:55)  Source: .Other wound  Final Report (02-15-20 @ 19:22):    No growth            INFECTIOUS DISEASES TESTING  Fungitell: >500 (02-11-20 @ 11:21)  Fungitell: <31 (02-04-20 @ 04:40)  Streptococcus Pneumoniae Ag Urine: Negative (02-02-20 @ 11:00)  MRSA PCR Result.: Positive (02-01-20 @ 20:40)  Fungitell: 49 (02-01-20 @ 11:17)  Rapid RVP Result: Detected (01-31-20 @ 16:24)  Legionella Antigen, Urine: Negative (01-31-20 @ 15:36)  Streptococcus Pneumoniae Ag Urine: Negative (01-31-20 @ 15:36)  Legionella Antigen, Urine: Negative (01-20-20 @ 02:50)  MRSA PCR Result.: Positive (01-14-20 @ 13:59)  MRSA PCR Result.: Negative (08-13-19 @ 08:46)      RADIOLOGY & ADDITIONAL TESTS:  I have personally reviewed the last available Chest xray  CXR  Xray Chest 1 View- PORTABLE-Urgent:   EXAM:  XR CHEST PORTABLE URGENT 1V            PROCEDURE DATE:  02/17/2020            INTERPRETATION:  Clinical History / Reason for exam: 75-year-old female post nasogastric tube placement.    Comparison:  Chest radiograph performed 2/17/2020 at 1:56 AM.    Technique/Positioning: A single, supine AP radiograph of the chest is submitted..    Findings:    Support devices: A nasogastric tube is present, with tip coiled in the left upper quadrant.  A left jugular vein central venous catheter is stable, with tip at the junction of the left innominate vein and superior vena cava.  An additional line/catheter projects over the right hemithorax, but evaluation is limited on this single, frontal image.    Cardiac/mediastinum/hilum: The cardiac silhouette is magnified.    Lung parenchyma/Pleura: There are stable bilateral diffuse lung opacities with pleural calcifications.  No pneumothorax is seen.    Skeleton/soft tissues: Stable    Impression:    1.  Stable bilateral diffuse lung opacities.  2. Status-post placement of nasogastric catheter, with tip appropriately positioned.                      TONY HERNANDEZ M.D., ATTENDING RADIOLOGIST  This document has been electronically signed. Feb 17 2020  2:03PM             (02-17-20 @ 12:24)      CT      CARDIOLOGY TESTING  Transthoracic Echocardiogram:    EXAM:  2-D ECHO (TTE) COMPLETE        PROCEDURE DATE:  02/15/2020      INTERPRETATION:  REPORT:    TRANSTHORACIC ECHOCARDIOGRAM REPORT         Patient Name:   DONI PATRICK Accession #: [id number]  Medical Rec #:  [id number]     Height:      64.0 in 162.6 cm  YOB: 1945    Weight:      179.7 lb 81.50 kg  Patient Age:    75 years     BSA:         1.87 m²  Patient Gender: F            BP:          135/69 mmHg       Date of Exam:        2/15/2020 9:26:12 AM  Referring Physician: [id number] ADRIANNE  Sonographer:         SAVITA  Fellow:              Braulio Khalil     Reading Physician:   Porfirio Agudelo MD.    Procedure:     2D Echo/Doppler/Color Doppler Complete.  Indications:   I21.3 - ST Elevation STEMI Myocardial Infarction of unspecified  Diagnosis:     ST elevation (STEMI) myocardial infarction of unspecified site -                 I21.3  Study Details: Technically fair study. Study quality was adversely affected due                 to COPD.         Summary:   1. Normal global left ventricular systolic function.   2. LV Ejection Fraction by Caballero's Method with a biplane EF of 62 %.   3. Elevated left atrial and left ventricular end-diastolic pressures.   4. Mild concentric left ventricular hypertrophy.   5. Mildly increased LV wall thickness.   6. Normal left ventricular internal cavity size.   7. Spectral Doppler shows impaired relaxation pattern of left ventricular myocardial filling (Grade I diastolic dysfunction).   8. The mean global longitudinal peak strain by speckle tracking is -15.1% which is reduced.   9. Estimated pulmonary artery systolic pressure is 41.8 mmHg assuming a right atrial pressure of 8 mmHg, which is consistent with mild pulmonary hypertension.  10. Pulmonary hypertension is present.  11. LA volume Index is 20.5 ml/m² ml/m2.    PHYSICIAN INTERPRETATION:  Left Ventricle: The left ventricular internal cavity size is normal. Left ventricular wall thickness is mildly increased. There is mild concentric left ventricular hypertrophy.Global LV systolic function was normal. Spectral Doppler shows impaired relaxation pattern of left ventricular myocardial filling (Grade I diastolic dysfunction). Elevated left atrial and left ventricular end-diastolic pressures. The mean global longitudinal peak strain by speckle tracking is -15.1% which is reduced.       LV Wall Scoring:  All segments are normal.    Right Ventricle: Normal right ventricular size and function.  Left Atrium: Normal left atrial size. LA volume Index is 20.5 ml/m² ml/m2.  Right Atrium: Normal right atrial size.  Pericardium: There is no evidence of pericardial effusion.  Mitral Valve: Structurally normal mitral valve, with normal leaflet excursion. The mitral valve is normal in structure. No evidence of mitral valve regurgitation is seen.  Tricuspid Valve: Structurally normal tricuspid valve, with normal leaflet excursion. The tricuspid valve is normal in structure. Mild tricuspid regurgitation is visualized. Estimated pulmonary artery systolic pressure is 41.8 mmHg assuming a right atrial pressure of 8 mmHg, which is consistent with mild pulmonary hypertension.  Aortic Valve: Normal trileaflet aortic valve with normal opening. The aortic valve is normal. No evidence of aortic valve regurgitation is seen.  Pulmonic Valve: Structurally normal pulmonic valve, with normal leaflet excursion. The pulmonic valve is normal. No indication of pulmonic valve regurgitation.  Aorta: The aortic root and ascending aorta are structurally normal, with no evidence of dilitation.  Pulmonary Artery: The main pulmonary artery is normal in size. Pulmonary hypertension is present.  Venous: The inferior vena cava was normal sized, with respiratory size variation less than 50%.       2D AND M-MODE MEASUREMENTS (normal ranges within parentheses):  Left                  Normal   Aorta/Left             Normal  Ventricle:                     Atrium:  IVSd (2D):  1.19 cm  (0.7-1.1) AoV Cusp       1.66  (1.5-2.6)  LVPWd (2D): 1.12 cm  (0.7-1.1) Separation:     cm  LVIDd (2D): 4.76 cm  (3.4-5.7) Left Atrium    3.22  (1.9-4.0)  LVIDs (2D): 3.41 cm            (Mmode):        cm  LV FS (2D):  28.3 %   (>25%)   Right  IVSd        1.14 cm  (0.7-1.1) Ventricle:  (Mmode):                       RVd (Mmode):   0.81 cm  LVPWd    1.30 cm  (0.7-1.1) RVd (2D):      1.69 cm  (Mmode):                       TAPSE:         1.70 cm  LVIDd       4.99 cm  (3.4-5.7)  (Mmode):  LVIDs       2.95 cm  (Mmode):  LV FS        40.9 %   (>25%)  (Mmode):  Relative      0.47    (<0.42)  Wall  Thickness  Rel. Wall     0.52    (<0.42)  Thickness  Mm  LV Mass      127.2  Index:        g/m²  Mmode    SPECTRAL DOPPLER ANALYSIS:  LV DIASTOLIC FUNCTION:  MV Peak E: 0.88 m/s Decel Time: 167 msec  MV Peak A: 1.02 m/s  E/A Ratio: 0.86    Aortic Valve:  AoV VMax:    1.69 m/s  AoV Area, Vmax: 2.55 cm² Vmax Indx: 1.36 cm²/m²  AoV Pk Grad: 11.4 mmHg    LVOT Vmax: 1.35 m/s  LVOT VTI:  0.26 m  LVOT Diam: 2.02 cm    Mitral Valve:  MV P1/2 Time: 48.29 msec  MV Area, PHT: 4.56 cm²    Tricuspid Valve and PA/RV Systolic Pressure: TR Max Velocity: 2.91 m/s RA Pressure: 8 mmHg RVSP/PASP: 41.8 mmHg       [id number] Porfirio Agudelo MD, Electronically signed on 2/15/2020 at 12:42:31 PM         *** Final ***                    PORFIRIO AGUDELO MD  This document has been electronically signed. Feb 15 2020  9:26AM             (02-15-20 @ 09:26)      MEDICATIONS  albuterol/ipratropium for Nebulization 3  calcitriol  Solution 0.25  calcium carbonate    500 mG (Tums) Chewable 3  caspofungin IVPB 50  chlorhexidine 4% Liquid 1  cyanocobalamin 1000  dextrose 5%. 1000  gabapentin 300  influenza   Vaccine 0.5  insulin regular Infusion 4  linezolid  IVPB 600  methylPREDNISolone sodium succinate Injectable 80  montelukast 10  multivitamin/minerals 1  mupirocin 2% Ointment 1  pantoprazole   Suspension 40      WEIGHT  Weight (kg): 81.5 (02-03-20 @ 12:15)    ANTIBIOTICS:  caspofungin IVPB 50 milliGRAM(s) IV Intermittent every 24 hours  linezolid  IVPB 600 milliGRAM(s) IV Intermittent every 12 hours      All available historical records have been reviewed

## 2020-02-19 NOTE — PROGRESS NOTE ADULT - ASSESSMENT
IMPRESSION:    Acute hypoxic respiratory failure   DAH resolved   Influenza A treated   ARDS  Candidemia   REJI improving   HO Autoimmune hepatitis on tacrolimus and chronic steroids   h/o hypercoagulable state/ vte was on coumadin   Left great saphenous vein thrombosis chronic    PLAN:    CNS:  Keep off sedation.  FU MS. IVLLAGOMEZENT: ET care.  Oral care.      PULMONARY:  HOB @ 45 degrees. lower Solumedrol 80 mg q6 hrs for now. Wean O2. Aspiration precautions.    low threshold for intubation    NIV  at night and as needed   CARDIOVASCULAR:  I=O.  resume D5 W 50 cc per hour start NG feed   free water 250 cc Q 4 hrs     GI: GI prophylaxis.  NG feeding    RENAL:  Follow up lytes. Replete as needed.  FU with Renal.  FU lytes 6 pm  BMP afternoon     INFECTIOUS DISEASE: Follow up cultures.  Continue Anti fungal. Zyvox     HEMATOLOGICAL:  DVT prophylaxis.  fu cbc     ENDOCRINE:  Follow up FS.  Insulin protocol if needed.    MUSCULOSKELETAL: Bed rest     bed in chair Mode     Code status: full     Prognosis: Poor prognosis     Continue MICU monitoring for now.

## 2020-02-19 NOTE — PROGRESS NOTE ADULT - ASSESSMENT
73 y/o F with REJI in hospital for SOB, hemoptysis, cough.  PMH asthma, COPD not on home O2, autoimmune hepatitis on prednisone and tacrolimus, heterozygous prothrombin gene mutation with hx of PE and DVT on coumadin, recent hospitalization for pneumonia   # REJI/ likely ATN in nature creat trending down   # non oliguric  # hypernatremia noted / keep free water and follow BMP   # BUN noted, trending down  on steroids and GI bleed   #  need to hold tacrolimus  in view of infection   # check ESR, ANCA, TEE , DNA levels   # corrected calcium around 8.7 , on Ca carbonate, increased to 3 tablets q 8h  PTH noted , on calcitriol 0.25 mcg q24, , phos at goal   # wbc noted, on steroids followed by id , remains  on antifungal / ZYvox   # s/p bronchoscopy , followed by pulmonary  # GI notes appreciated   # no acute indication for RRT  # will sign off recall PRN please   # prognosis guarded

## 2020-02-19 NOTE — SWALLOW BEDSIDE ASSESSMENT ADULT - SLP PERTINENT HISTORY OF CURRENT PROBLEM
pt admitted from [facility] (there for short-term rehab) for worsening SOB hemoptysis and cough. PMHx: asthma, COPD not on home O2, autoimmune hepatitis; recent hospitalization in January 2020 with a diagnosis of pneumonia; pt being treated for acute hypoxic resp failure with hypoxia due to influenza and HCAP, Septic shock in immunocompromised patient; pt intubated 2/1 2' respiratory distress while on bipap.

## 2020-02-19 NOTE — PROGRESS NOTE ADULT - SUBJECTIVE AND OBJECTIVE BOX
Hospital Day:  19d    Subjective:  Pt was interviewed and examined at the bedside in the AM. No acute events overnight.   Pt appears more awake today. Follows commands appropriately.     Past Medical Hx:   Prothrombin gene mutation  Autoimmune hepatitis  Other chronic pulmonary embolism without acute cor pulmonale  Essential hypertension  Autoimmune hepatitis treated with steroids  Asthma  DVT (deep venous thrombosis)    Past Sx:  S/P debridement  History of back surgery  No significant past surgical history    Allergies:  No Known Allergies    Current Meds:   Standng Meds:  albuterol/ipratropium for Nebulization 3 milliLiter(s) Nebulizer every 6 hours  calcitriol  Solution 0.25 MICROGram(s) Oral daily  calcium carbonate    500 mG (Tums) Chewable 3 Tablet(s) Chew every 8 hours  caspofungin IVPB 50 milliGRAM(s) IV Intermittent every 24 hours  chlorhexidine 4% Liquid 1 Application(s) Topical <User Schedule>  cyanocobalamin 1000 MICROGram(s) Oral daily  dextrose 5%. 1000 milliLiter(s) (50 mL/Hr) IV Continuous <Continuous>  gabapentin 300 milliGRAM(s) Oral at bedtime  influenza   Vaccine 0.5 milliLiter(s) IntraMuscular once  insulin regular Infusion 4 Unit(s)/Hr (4 mL/Hr) IV Continuous <Continuous>  linezolid  IVPB 600 milliGRAM(s) IV Intermittent every 12 hours  methylPREDNISolone sodium succinate Injectable 80 milliGRAM(s) IV Push every 6 hours  montelukast 10 milliGRAM(s) Oral at bedtime  multivitamin/minerals 1 Tablet(s) Oral daily  mupirocin 2% Ointment 1 Application(s) Topical two times a day  pantoprazole   Suspension 40 milliGRAM(s) Oral daily    PRN Meds:  acetaminophen   Tablet .. 650 milliGRAM(s) Oral every 6 hours PRN Temp greater or equal to 38C (100.4F)  oxyCODONE    IR 10 milliGRAM(s) Oral every 8 hours PRN Severe Pain (7 - 10)    HOME MEDICATIONS:  acetaminophen 500 mg oral tablet: 2 tab(s) orally every 8 hours  amLODIPine 10 mg oral tablet: 1 tab(s) orally once a day (at bedtime)  budesonide 3 mg oral capsule, extended release: 1 cap(s) orally 2 times a day  ferrous sulfate 325 mg (65 mg elemental iron) oral delayed release tablet: 1 tab(s) orally once a day  furosemide 20 mg oral tablet: 1 tab(s) orally once a day  gabapentin 300 mg oral capsule: 1 cap(s) orally once a day (at bedtime)  ibuprofen 400 mg oral tablet: 1 tab(s) orally every 6 hours, As needed, Moderate Pain (4 - 6)  ipratropium-albuterol 0.5 mg-2.5 mg/3 mLinhalation solution: 3 milliliter(s) inhaled every 6 hours, As Needed  lidocaine 5% topical film: Apply topically to affected area once a day  Metoprolol Tartrate 50 mg oral tablet: 1 tab(s) orally once a day  montelukast 10 mg oral tablet: 1 tab(s) orally once a day (at bedtime)  Multiple Vitamins with Minerals oral tablet: 1 tab(s) orally once a day  oxyCODONE 10 mg oral tablet: 1 tab(s) orally every 6 hours, As needed, Severe Pain (7 - 10)  predniSONE 20 mg oral tablet: 3 tab(s) orally once a day  ProAir HFA 90 mcg/inh inhalation aerosol: 1 puff(s) inhaled every 4 hours, As Needed  risedronate 150 mg oral tablet: 1 tab(s) orally once a month  tacrolimus 0.5 mg oral capsule: 1 cap(s) orally every 12 hours  tiotropium 18 mcg inhalation capsule: 1 cap(s) inhaled once a day, As Needed  Vitamin B12 1000 mcg oral tablet: 1 tab(s) orally once a day  Vitamin C 1000 mg oral tablet: 1 tab(s) orally once a day  warfarin 4 mg oral tablet: 1 tab(s) orally once a day (at bedtime)      Vital Signs:   T(F): 97.8 (02-19-20 @ 12:00), Max: 97.8 (02-19-20 @ 00:00)  HR: 100 (02-19-20 @ 14:30) (66 - 122)  BP: 161/78 (02-19-20 @ 14:30) (105/51 - 179/78)  RR: 18 (02-19-20 @ 14:30) (10 - 38)  SpO2: 94% (02-19-20 @ 14:30) (92% - 99%)      02-18-20 @ 07:01  -  02-19-20 @ 07:00  --------------------------------------------------------  IN: 2345 mL / OUT: 975 mL / NET: 1370 mL    02-19-20 @ 07:01  -  02-19-20 @ 14:50  --------------------------------------------------------  IN: 1936 mL / OUT: 600 mL / NET: 1336 mL        Physical Exam:   CONSTITUTIONAL: NAD, lethargic   HEAD: Normocephalic; atraumatic, NG+, on high flow   EYES: PERRL, EOMI, no conjunctival erythema  CARD: +S1, S2 Regular rate and rhythm, tachy  RESP: b/l ronchi  ABD: soft nontender, distended, no rebound, no guarding, no rigidity,   EXT: edema on limbs, wounds on b/l arms   NEURO: opens eyes, does follows commands, to weak to move limbs   Lines: L IJ       Labs:                         8.9    20.11 )-----------( 126      ( 19 Feb 2020 06:15 )             27.2       19 Feb 2020 06:15    149    |  110    |  89     ----------------------------<  87     3.5     |  24     |  1.3      Ca    7.8        19 Feb 2020 06:15  Phos  4.7       18 Feb 2020 06:07  Mg     1.8       19 Feb 2020 06:15    TPro  4.8    /  Alb  2.7    /  TBili  0.9    /  DBili  x      /  AST  29     /  ALT  56     /  AlkPhos  229    19 Feb 2020 06:15    Culture - Blood (collected 02-17-20 @ 05:17)  Source: .Blood None  Preliminary Report (02-18-20 @ 14:02):    No growth to date.    Culture - Blood (collected 02-15-20 @ 04:57)  Source: .Blood None  Preliminary Report (02-16-20 @ 18:01):    No growth to date.    Culture - Blood (collected 02-14-20 @ 04:30)  Source: .Blood Blood-Peripheral  Final Report (02-19-20 @ 14:00):    No growth at 5 days.        Radiology:

## 2020-02-19 NOTE — PROGRESS NOTE ADULT - SUBJECTIVE AND OBJECTIVE BOX
Patient is a 75y old  Female who presents with a chief complaint of SOB and desaturation (18 Feb 2020 20:37)      Over Night Events:  Patient seen and examined still high flow an bipap       ROS:  See HPI    PHYSICAL EXAM    ICU Vital Signs Last 24 Hrs  T(C): 36 (19 Feb 2020 04:00), Max: 36.8 (18 Feb 2020 11:00)  T(F): 96.8 (19 Feb 2020 04:00), Max: 98.3 (18 Feb 2020 11:00)  HR: 84 (19 Feb 2020 06:30) (66 - 122)  BP: 163/78 (19 Feb 2020 06:30) (105/51 - 179/78)  BP(mean): 122 (19 Feb 2020 06:30) (73 - 141)  ABP: --  ABP(mean): --  RR: 12 (19 Feb 2020 06:30) (11 - 37)  SpO2: 97% (19 Feb 2020 06:30) (92% - 99%)      General: awake weak   HEENT:  pel              Lymph Nodes: NO cervical LN   Lungs: Bilateral crackles   Cardiovascular: Regular   Abdomen: Soft, Positive BS  Extremities: No clubbing   Skin: warm   Neurological: b.l weakness   Musculoskeletal: move all ext     I&O's Detail    18 Feb 2020 07:01  -  19 Feb 2020 07:00  --------------------------------------------------------  IN:    dextrose 5%.: 200 mL    Enteral Tube Flush: 200 mL    Free Water: 1000 mL    insulin regular Infusion: 105 mL    Nepro with Carb Steady: 840 mL  Total IN: 2345 mL    OUT:    Indwelling Catheter - Urethral: 325 mL    Voided: 650 mL  Total OUT: 975 mL    Total NET: 1370 mL          LABS:                          8.9    20.11 )-----------( 126      ( 19 Feb 2020 06:15 )             27.2         19 Feb 2020 06:15    149    |  110    |  89     ----------------------------<  87     3.5     |  24     |  1.3      Ca    7.8        19 Feb 2020 06:15  Phos  4.7       18 Feb 2020 06:07  Mg     1.8       19 Feb 2020 06:15    TPro  4.8    /  Alb  2.7    /  TBili  0.9    /  DBili  x      /  AST  29     /  ALT  56     /  AlkPhos  229    19 Feb 2020 06:15  Amylase x     lipase x                                                                                                                                                    Culture - Blood (collected 17 Feb 2020 05:17)  Source: .Blood None  Preliminary Report (18 Feb 2020 14:02):    No growth to date.                                                                                           MEDICATIONS  (STANDING):  albuterol/ipratropium for Nebulization 3 milliLiter(s) Nebulizer every 6 hours  calcitriol  Solution 0.25 MICROGram(s) Oral daily  calcium carbonate    500 mG (Tums) Chewable 3 Tablet(s) Chew every 8 hours  caspofungin IVPB 50 milliGRAM(s) IV Intermittent every 24 hours  chlorhexidine 4% Liquid 1 Application(s) Topical <User Schedule>  cyanocobalamin 1000 MICROGram(s) Oral daily  gabapentin 300 milliGRAM(s) Oral at bedtime  influenza   Vaccine 0.5 milliLiter(s) IntraMuscular once  insulin regular Infusion 4 Unit(s)/Hr (4 mL/Hr) IV Continuous <Continuous>  linezolid  IVPB 600 milliGRAM(s) IV Intermittent every 12 hours  methylPREDNISolone sodium succinate Injectable 80 milliGRAM(s) IV Push every 6 hours  montelukast 10 milliGRAM(s) Oral at bedtime  multivitamin/minerals 1 Tablet(s) Oral daily  mupirocin 2% Ointment 1 Application(s) Topical two times a day  pantoprazole   Suspension 40 milliGRAM(s) Oral daily    MEDICATIONS  (PRN):  acetaminophen   Tablet .. 650 milliGRAM(s) Oral every 6 hours PRN Temp greater or equal to 38C (100.4F)  oxyCODONE    IR 10 milliGRAM(s) Oral every 8 hours PRN Severe Pain (7 - 10)          Xrays:  TLC:  OG:  ET tube:                                                                                    b/l oapcity stable    ECHO:  [facility] ICU:

## 2020-02-20 NOTE — CHART NOTE - NSCHARTNOTEFT_GEN_A_CORE
Registered Dietitian Follow-Up    ***Scroll to the bottom for RD recommendation***    Patient Profile Reviewed                           Yes [x]   No []  Nutrition History Previously Obtained        Yes [x]  No []          PERTINENT SUBJECTIVE INFORMATION (LATEST AS OF TODAY):  - FAMILY at bedside.   - Pt is awake but not very alert. Has HFNC.   - pt remains in NEPRO at 210ml q6hr (NGT) + Beneprotein x1  = 1512 kcal/ 73g protrein        PERTINENT MEDICAL INFORMATIONS:  (1) P/w SOB and desat.   (2) Acute hypoxic resp failure w/ hypoxia due to Influenza. HCAP. - resolved  (3) Wean for vent back on Hi flow NC. s/p extubation 2/15-16?  (4) s/p abx and anti-viral  (5) ID and pulm to follow.   (6) REJI on CKD renal consulted. Now BUN/Cr downtrending  (7) Local wound care, c/w treatment  (8) SLP to eval          DIET ORDER:   NEPRO at 210ml q6hr (NGT) + Beneprotein x1  = 1512 kcal/ 73g protrein        ANTHROPOMETRICS:  - Ht.  162.6cm  - Wt.   (2/1): 74.8kg   (2/2): 74.3kg  (2/3): 81.5kg  (2/4): 78.4kg  (2/8): 87.7kg  (2/10): 86.7kg  (2/16): 86kg   (2/19): 87.9kg- weight trended up likely from edema. will monitor.  - BMI. 28- 30.8  - IBW. 54.5kg       PERTINENT LAB DATA:  2/20: h/h 8.8/26.4, BUN 69, Glucose 211, Ca 7.4, Albumin 2.5, GFR 49, Mag 1.4  PERTINENT MEDS: insulin, methylprednisolone, oxy, adriano carbonate, acetaminophen, calcitriol, protonix, vitamin b12, MVI      PHYSICAL FINDINGS  - APPEARANCE:        awake but not very alert and oriented. no edema noted most recently, but pt had severe edema previously  - GI FUNCTION:       LBM 2/19 , rectal tube d/c'd?  - TUBES:                       - ORAL/MOUTH:      NPO, failed SLP 2/19  - SKIN:                        ecchymosis        NUTRITION REQUIREMENTS  WEIGHT USED:                          Ht: 162.6cm, Wt: 74.8kg (more likely pt's wt PTA, BMI: 28.5  ESTIMATED ENERGY NEEDS:       CONTINUE [  ]      ADJUST [ x ]    ESTIMATED ENERGY NEEDS:         3683-9470 kcal/day (1228 MSJ x 1.2-1.3)  ESTIMATED PROTEIN NEEDS:        67-75 g/day (0.9-1.0 g/kg of ABW) - poor renal status at this time, adjust prn  ESTIMATED FLUID NEEDS:             fluid per ICU team    CURRENT NUTRIENT NEEDS:    receiving 1512 kcal/ 73g protein          [  x] PREVIOUS NUTRITION DIAGNOSIS:    (1) Inadequate energy intake  (meeting, but there is no indication of using this formula)            [x  ] ONGOING        [  ] RESOLVED            PATIENT INTERVENTION:    [  ] ORAL        [ x] EN/TF     GOAL/EXPECTED OUTCOME:     pt to meet and tolerate >85% of estimated kcal/protein via TF upon f/u in 4 days.   INDICATOR/MONITORING:       RD to monitor diet order, energy intake, body composition, nutrition focused physical findings (EN tolerance, s/s, appetite, renal profile)  NUTRITION INTERVENTION:        Enteral nutrition, coordination of care        RECS: (1)  Pending SLP re-evaluation, please order diet per SLP recs when pt passes the test. (2) No indication to use Nepro formula at this time as renal status is gradually improving. Change formula to Osmolite 1.5 at 240ml q6hr with No-Carb Prosource TF packet x1/day (each feed may run for 2 hour long). This regimen gives a total of 1460 kcal/ 71g protein/ 1710mg potassium (will monitor renal function)/  730mL free water, additional flushes per ICU team.

## 2020-02-20 NOTE — PROGRESS NOTE ADULT - SUBJECTIVE AND OBJECTIVE BOX
ELDER, DONI  75y  Female  HPI:  73y/o F w/ hx of asthma, COPD not on home O2, autoimmune hepatitis on prednisone and tacrolimus, heterozygous prothrombin gene mutation with hx of PE and DVT on coumadin with recent hospitalization in January 2020 with a diagnosis of pneumonia presents for worsening SOB, hemoptysis and cough. Everything started yesterday night when patient was in bed, started to feel shortness of breath and had many episodes of hemoptysis with clots, she also had substernal chest pain non radiating, moderate in intensity that worsens on coughing. She denies fever but endorses chills. She also admits for lightheadedness that occurred yesterday, no HA, abd pain, dysuria or paresthesias. She had 2 loose bowel movements since yesterday with some blood in the stools that she attributes to her hemorrhoids. She stopped Coumadin couple of days ago since her INR was supratherapeutic. She is from [facility] short term and also mentions that her  and another family member have the flu and she was exposed to them. She did not have the flu shot this season.  In ED, temp was 100.1 rectally, tachycardic ( sinus) , she was saturating to 70s on non rebreather and her SaO2 went up to 95%. ABG showing respiratory alkalosis initially and then corrected after BIPAP placement and correction of RR.  CTA chest showing no PE but showing interval development of multifocal bilateral groundglass opacities as well as new moderate to severe bronchiectasis in the right lung. Findings are concerning for an acute infectious/inflammatory process. (31 Jan 2020 14:36)    MEDICATIONS  (STANDING):  albuterol/ipratropium for Nebulization 3 milliLiter(s) Nebulizer every 6 hours  calcitriol  Solution 0.25 MICROGram(s) Oral daily  calcium carbonate    500 mG (Tums) Chewable 3 Tablet(s) Chew every 8 hours  chlorhexidine 4% Liquid 1 Application(s) Topical <User Schedule>  cyanocobalamin 1000 MICROGram(s) Oral daily  fluconAZOLE   Tablet 400 milliGRAM(s) Oral daily  gabapentin 300 milliGRAM(s) Oral at bedtime  influenza   Vaccine 0.5 milliLiter(s) IntraMuscular once  insulin regular Infusion 4 Unit(s)/Hr (4 mL/Hr) IV Continuous <Continuous>  linezolid    Tablet 600 milliGRAM(s) Oral every 12 hours  methylPREDNISolone sodium succinate Injectable 60 milliGRAM(s) IV Push every 8 hours  montelukast 10 milliGRAM(s) Oral at bedtime  multivitamin/minerals 1 Tablet(s) Oral daily  mupirocin 2% Ointment 1 Application(s) Topical two times a day  pantoprazole   Suspension 40 milliGRAM(s) Oral daily    MEDICATIONS  (PRN):  acetaminophen   Tablet .. 650 milliGRAM(s) Oral every 6 hours PRN Temp greater or equal to 38C (100.4F)  oxyCODONE    IR 10 milliGRAM(s) Oral every 8 hours PRN Severe Pain (7 - 10)  oxyCODONE    IR 5 milliGRAM(s) Oral every 6 hours PRN breakthrough pain    INTERVAL EVENTS: Patient seen today without distress, more alert,  and son at bedside.     T(C): 36.6 (02-20-20 @ 16:00), Max: 36.7 (02-20-20 @ 12:00)  HR: 103 (02-20-20 @ 19:56) (66 - 124)  BP: 139/73 (02-20-20 @ 18:00) (113/65 - 216/105)  RR: 27 (02-20-20 @ 18:30) (12 - 45)  SpO2: 99% (02-20-20 @ 19:56) (81% - 100%)  Wt(kg): --Vital Signs Last 24 Hrs  T(C): 36.6 (20 Feb 2020 16:00), Max: 36.7 (20 Feb 2020 12:00)  T(F): 97.8 (20 Feb 2020 16:00), Max: 98 (20 Feb 2020 12:00)  HR: 103 (20 Feb 2020 19:56) (66 - 124)  BP: 139/73 (20 Feb 2020 18:00) (113/65 - 216/105)  BP(mean): 96 (20 Feb 2020 18:00) (69 - 162)  RR: 27 (20 Feb 2020 18:30) (12 - 45)  SpO2: 99% (20 Feb 2020 19:56) (81% - 100%)    PHYSICAL EXAM:  GENERAL: NAD, hi-flow nasal O2 in place  NECK: Supple, No JVD,  CHEST/LUNG: Few crackles   HEART: S1, S2, Regular rate and rhythm  ABDOMEN: Soft, Nontender, Bowel sounds present  EXTREMITIES: ++ edema, bruising,   SKIN: rash at base of neck    LABS:                        8.8    16.54 )-----------( 108      ( 20 Feb 2020 06:40 )             26.4             02-20    140  |  103  |  69<HH>  ----------------------------<  211<H>  4.7   |  23  |  1.1    Ca    7.4<L>      20 Feb 2020 06:40  Phos  3.5     02-20  Mg     1.4     02-20    TPro  4.6<L>  /  Alb  2.5<L>  /  TBili  1.2  /  DBili  x   /  AST  26  /  ALT  45<H>  /  AlkPhos  202<H>  02-20    LIVER FUNCTIONS - ( 20 Feb 2020 06:40 )  Alb: 2.5 g/dL / Pro: 4.6 g/dL / ALK PHOS: 202 U/L / ALT: 45 U/L / AST: 26 U/L / GGT: x                       RADIOLOGY & ADDITIONAL TESTS:  < from: Xray Chest 1 View- PORTABLE-Routine (02.20.20 @ 05:17) >  Findings:    Support devices: Nasogastric tube and left IJ line in stable position. Overlying EKG leads.    Cardiac/mediastinum/hilum: Stable.    Lung parenchyma/Pleura: No significant change in scattered bilateral opacities and pleural effusions. Unchanged calcified bilateral pleural plaques. No pneumothorax.    Skeleton/soft tissues: Stable.    Impression:      No significant change in scattered bilateral opacities and pleural effusions.      < end of copied text >

## 2020-02-20 NOTE — PROGRESS NOTE ADULT - SUBJECTIVE AND OBJECTIVE BOX
DONI PATRICK  75y, Female  Allergy: No Known Allergies      LOS  20d    CHIEF COMPLAINT: SOB and desaturation (20 Feb 2020 08:10)      INTERVAL EVENTS/HPI  - No acute events overnight  - T(F): , Max: 98 (02-19-20 @ 16:00)  - Denies any worsening symptoms  - Tolerating medication  - WBC Count: 16.54 (02-20-20 @ 06:40) <--WBC Count: 20.11 (02-19-20 @ 06:15)  - Creatinine, Serum: 1.1 (02-20-20 @ 06:40)  Creatinine, Serum: 1.2 (02-19-20 @ 23:44)       ROS  General: Denies rigors, nightsweats  HEENT: Denies headache, rhinorrhea, sore throat, eye pain  CV: Denies CP, palpitations  PULM: Denies wheezing, hemoptysis  GI: Denies hematemesis, hematochezia, melena  : Denies discharge, hematuria  MSK: Denies arthralgias, myalgias  SKIN: Denies rash, lesions  NEURO: Denies paresthesias, weakness  PSYCH: Denies depression, anxiety    VITALS:  T(F): 96.9, Max: 98 (02-19-20 @ 16:00)  HR: 102  BP: 169/78  RR: 18Vital Signs Last 24 Hrs  T(C): 36.1 (20 Feb 2020 04:00), Max: 36.7 (19 Feb 2020 16:00)  T(F): 96.9 (20 Feb 2020 04:00), Max: 98 (19 Feb 2020 16:00)  HR: 102 (20 Feb 2020 06:30) (66 - 114)  BP: 169/78 (20 Feb 2020 06:30) (119/65 - 182/82)  BP(mean): 125 (20 Feb 2020 06:30) (89 - 139)  RR: 18 (20 Feb 2020 06:30) (10 - 45)  SpO2: 94% (20 Feb 2020 06:30) (90% - 98%)    PHYSICAL EXAM:  Gen: HFNC  HEENT: Normocephalic, atraumatic  Neck: supple, no lymphadenopathy  CV: Regular rate & regular rhythm  Lungs: decreased BS at bases, no fremitus  Abdomen: Soft, BS present  Ext: Warm, well perfused, anasarca  Neuro: nonfocal, more alert today  Skin: edema/erythema, ecchymoses UE LE, open wound L knee, L wrist  Lines: no phlebitis, L IJ      FH: Non-contributory  Social Hx: Non-contributory    TESTS & MEASUREMENTS:                        8.8    16.54 )-----------( 108      ( 20 Feb 2020 06:40 )             26.4     02-20    140  |  103  |  69<HH>  ----------------------------<  211<H>  4.7   |  23  |  1.1    Ca    7.4<L>      20 Feb 2020 06:40  Phos  3.5     02-20  Mg     1.4     02-20    TPro  4.6<L>  /  Alb  2.5<L>  /  TBili  1.2  /  DBili  x   /  AST  26  /  ALT  45<H>  /  AlkPhos  202<H>  02-20    eGFR if Non African American: 49 mL/min/1.73M2 (02-20-20 @ 06:40)  eGFR if African American: 57 mL/min/1.73M2 (02-20-20 @ 06:40)  eGFR if Non African American: 44 mL/min/1.73M2 (02-19-20 @ 23:44)  eGFR if : 51 mL/min/1.73M2 (02-19-20 @ 23:44)  eGFR if Non African American: 44 mL/min/1.73M2 (02-19-20 @ 16:00)  eGFR if : 51 mL/min/1.73M2 (02-19-20 @ 16:00)    LIVER FUNCTIONS - ( 20 Feb 2020 06:40 )  Alb: 2.5 g/dL / Pro: 4.6 g/dL / ALK PHOS: 202 U/L / ALT: 45 U/L / AST: 26 U/L / GGT: x               Culture - Blood (collected 02-17-20 @ 05:17)  Source: .Blood None  Preliminary Report (02-18-20 @ 14:02):    No growth to date.    Culture - Blood (collected 02-15-20 @ 04:57)  Source: .Blood None  Preliminary Report (02-16-20 @ 18:01):    No growth to date.    Culture - Sputum (collected 02-14-20 @ 17:00)  Source: .Sputum Sputum  Gram Stain (02-15-20 @ 04:56):    Few Squamous epithelial cells per low power field    Few polymorphonuclear leukocytes per low power field    Few Yeast like cells per oil power field    Few Gram variable coccobacilli per oil power field  Final Report (02-16-20 @ 17:44):    Moderate Methicillin resistant Staphylococcus aureus    Normal Respiratory Nikki present  Organism: Methicillin resistant Staphylococcus aureus (02-16-20 @ 17:44)  Organism: Methicillin resistant Staphylococcus aureus (02-16-20 @ 17:44)      -  Ampicillin/Sulbactam: R <=8/4      -  Cefazolin: R <=4      -  Clindamycin: S <=0.25      -  Erythromycin: R >4      -  Gentamicin: S <=1 Should not be used as monotherapy      -  Linezolid: S 2      -  Oxacillin: R >2      -  Penicillin: R >8      -  RIF- Rifampin: S <=1 Should not be used as monotherapy      -  Tetra/Doxy: S <=1      -  Trimethoprim/Sulfamethoxazole: S <=0.5/9.5      -  Vancomycin: S 2      Method Type: EROS    Culture - Blood (collected 02-14-20 @ 04:30)  Source: .Blood Blood-Peripheral  Final Report (02-19-20 @ 14:00):    No growth at 5 days.    Culture - Other (collected 02-13-20 @ 18:55)  Source: .Other wound  Final Report (02-15-20 @ 19:22):    No growth            INFECTIOUS DISEASES TESTING  Fungitell: >500 (02-11-20 @ 11:21)  Fungitell: <31 (02-04-20 @ 04:40)  Streptococcus Pneumoniae Ag Urine: Negative (02-02-20 @ 11:00)  MRSA PCR Result.: Positive (02-01-20 @ 20:40)  Fungitell: 49 (02-01-20 @ 11:17)  Rapid RVP Result: Detected (01-31-20 @ 16:24)  Legionella Antigen, Urine: Negative (01-31-20 @ 15:36)  Streptococcus Pneumoniae Ag Urine: Negative (01-31-20 @ 15:36)  Legionella Antigen, Urine: Negative (01-20-20 @ 02:50)  MRSA PCR Result.: Positive (01-14-20 @ 13:59)  MRSA PCR Result.: Negative (08-13-19 @ 08:46)      RADIOLOGY & ADDITIONAL TESTS:  I have personally reviewed the last available Chest xray  CXR  Xray Chest 1 View- PORTABLE-Urgent:   EXAM:  XR CHEST PORTABLE URGENT 1V            PROCEDURE DATE:  02/17/2020            INTERPRETATION:  Clinical History / Reason for exam: 75-year-old female post nasogastric tube placement.    Comparison:  Chest radiograph performed 2/17/2020 at 1:56 AM.    Technique/Positioning: A single, supine AP radiograph of the chest is submitted..    Findings:    Support devices: A nasogastric tube is present, with tip coiled in the left upper quadrant.  A left jugular vein central venous catheter is stable, with tip at the junction of the left innominate vein and superior vena cava.  An additional line/catheter projects over the right hemithorax, but evaluation is limited on this single, frontal image.    Cardiac/mediastinum/hilum: The cardiac silhouette is magnified.    Lung parenchyma/Pleura: There are stable bilateral diffuse lung opacities with pleural calcifications.  No pneumothorax is seen.    Skeleton/soft tissues: Stable    Impression:    1.  Stable bilateral diffuse lung opacities.  2. Status-post placement of nasogastric catheter, with tip appropriately positioned.                      TONY HERNANDEZ M.D., ATTENDING RADIOLOGIST  This document has been electronically signed. Feb 17 2020  2:03PM             (02-17-20 @ 12:24)      CT      CARDIOLOGY TESTING  Transthoracic Echocardiogram:    EXAM:  2-D ECHO (TTE) COMPLETE        PROCEDURE DATE:  02/15/2020      INTERPRETATION:  REPORT:    TRANSTHORACIC ECHOCARDIOGRAM REPORT         Patient Name:   DONI PATRICK Accession #: [id number]  Medical Rec #:  [id number]     Height:      64.0 in 162.6 cm  YOB: 1945    Weight:      179.7 lb 81.50 kg  Patient Age:    75 years     BSA:         1.87 m²  Patient Gender: F            BP:          135/69 mmHg       Date of Exam:        2/15/2020 9:26:12 AM  Referring Physician: [id number] SILVER  Sonographer:         SAVITA  Fellow:              Braulio Khalil     Reading Physician:   Porfirio Agudelo MD.    Procedure:     2D Echo/Doppler/Color Doppler Complete.  Indications:   I21.3 - ST Elevation STEMI Myocardial Infarction of unspecified  Diagnosis:     ST elevation (STEMI) myocardial infarction of unspecified site -                 I21.3  Study Details: Technically fair study. Study quality was adversely affected due                 to COPD.         Summary:   1. Normal global left ventricular systolic function.   2. LV Ejection Fraction by Caballero's Method with a biplane EF of 62 %.   3. Elevated left atrial and left ventricular end-diastolic pressures.   4. Mild concentric left ventricular hypertrophy.   5. Mildly increased LV wall thickness.   6. Normal left ventricular internal cavity size.   7. Spectral Doppler shows impaired relaxation pattern of left ventricular myocardial filling (Grade I diastolic dysfunction).   8. The mean global longitudinal peak strain by speckle tracking is -15.1% which is reduced.   9. Estimated pulmonary artery systolic pressure is 41.8 mmHg assuming a right atrial pressure of 8 mmHg, which is consistent with mild pulmonary hypertension.  10. Pulmonary hypertension is present.  11. LA volume Index is 20.5 ml/m² ml/m2.    PHYSICIAN INTERPRETATION:  Left Ventricle: The left ventricular internal cavity size is normal. Left ventricular wall thickness is mildly increased. There is mild concentric left ventricular hypertrophy.Global LV systolic function was normal. Spectral Doppler shows impaired relaxation pattern of left ventricular myocardial filling (Grade I diastolic dysfunction). Elevated left atrial and left ventricular end-diastolic pressures. The mean global longitudinal peak strain by speckle tracking is -15.1% which is reduced.       LV Wall Scoring:  All segments are normal.    Right Ventricle: Normal right ventricular size and function.  Left Atrium: Normal left atrial size. LA volume Index is 20.5 ml/m² ml/m2.  Right Atrium: Normal right atrial size.  Pericardium: There is no evidence of pericardial effusion.  Mitral Valve: Structurally normal mitral valve, with normal leaflet excursion. The mitral valve is normal in structure. No evidence of mitral valve regurgitation is seen.  Tricuspid Valve: Structurally normal tricuspid valve, with normal leaflet excursion. The tricuspid valve is normal in structure. Mild tricuspid regurgitation is visualized. Estimated pulmonary artery systolic pressure is 41.8 mmHg assuming a right atrial pressure of 8 mmHg, which is consistent with mild pulmonary hypertension.  Aortic Valve: Normal trileaflet aortic valve with normal opening. The aortic valve is normal. No evidence of aortic valve regurgitation is seen.  Pulmonic Valve: Structurally normal pulmonic valve, with normal leaflet excursion. The pulmonic valve is normal. No indication of pulmonic valve regurgitation.  Aorta: The aortic root and ascending aorta are structurally normal, with no evidence of dilitation.  Pulmonary Artery: The main pulmonary artery is normal in size. Pulmonary hypertension is present.  Venous: The inferior vena cava was normal sized, with respiratory size variation less than 50%.       2D AND M-MODE MEASUREMENTS (normal ranges within parentheses):  Left                  Normal   Aorta/Left             Normal  Ventricle:                     Atrium:  IVSd (2D):  1.19 cm  (0.7-1.1) AoV Cusp       1.66  (1.5-2.6)  LVPWd (2D): 1.12 cm  (0.7-1.1) Separation:     cm  LVIDd (2D): 4.76 cm  (3.4-5.7) Left Atrium    3.22  (1.9-4.0)  LVIDs (2D): 3.41 cm            (Mmode):        cm  LV FS (2D):  28.3 %   (>25%)   Right  IVSd        1.14 cm  (0.7-1.1) Ventricle:  (Mmode):                       RVd (Mmode):   0.81 cm  LVPWd    1.30 cm  (0.7-1.1) RVd (2D):      1.69 cm  (Mmode):                       TAPSE:         1.70 cm  LVIDd       4.99 cm  (3.4-5.7)  (Mmode):  LVIDs       2.95 cm  (Mmode):  LV FS        40.9 %   (>25%)  (Mmode):  Relative      0.47    (<0.42)  Wall  Thickness  Rel. Wall     0.52    (<0.42)  Thickness  Mm  LV Mass      127.2  Index:        g/m²  Mmode    SPECTRAL DOPPLER ANALYSIS:  LV DIASTOLIC FUNCTION:  MV Peak E: 0.88 m/s Decel Time: 167 msec  MV Peak A: 1.02 m/s  E/A Ratio: 0.86    Aortic Valve:  AoV VMax:    1.69 m/s  AoV Area, Vmax: 2.55 cm² Vmax Indx: 1.36 cm²/m²  AoV Pk Grad: 11.4 mmHg    LVOT Vmax: 1.35 m/s  LVOT VTI:  0.26 m  LVOT Diam: 2.02 cm    Mitral Valve:  MV P1/2 Time: 48.29 msec  MV Area, PHT: 4.56 cm²    Tricuspid Valve and PA/RV Systolic Pressure: TR Max Velocity: 2.91 m/s RA Pressure: 8 mmHg RVSP/PASP: 41.8 mmHg       [id number] Porfirio Agudelo MD, Electronically signed on 2/15/2020 at 12:42:31 PM         *** Final ***                    PORFIRIO AGUDELO MD  This document has been electronically signed. Feb 15 2020  9:26AM             (02-15-20 @ 09:26)      MEDICATIONS  albuterol/ipratropium for Nebulization 3  calcitriol  Solution 0.25  calcium carbonate    500 mG (Tums) Chewable 3  caspofungin IVPB 50  chlorhexidine 4% Liquid 1  cyanocobalamin 1000  gabapentin 300  influenza   Vaccine 0.5  insulin regular Infusion 4  linezolid  IVPB 600  magnesium sulfate  IVPB 2  methylPREDNISolone sodium succinate Injectable 60  montelukast 10  multivitamin/minerals 1  mupirocin 2% Ointment 1  pantoprazole   Suspension 40      WEIGHT  Weight (kg): 81.5 (02-03-20 @ 12:15)    ANTIBIOTICS:  caspofungin IVPB 50 milliGRAM(s) IV Intermittent every 24 hours  linezolid  IVPB 600 milliGRAM(s) IV Intermittent every 12 hours      All available historical records have been reviewed

## 2020-02-20 NOTE — PROGRESS NOTE ADULT - SUBJECTIVE AND OBJECTIVE BOX
Hospital Day:  20d    Subjective:    Pt was interviewed and examined at the bedside in the AM. No acute events overnight.   Denies any complaints. Progressively better.       Past Medical Hx:   Prothrombin gene mutation  Autoimmune hepatitis  Other chronic pulmonary embolism without acute cor pulmonale  Essential hypertension  Autoimmune hepatitis treated with steroids  Asthma  DVT (deep venous thrombosis)    Past Sx:  S/P debridement  History of back surgery  No significant past surgical history    Allergies:  No Known Allergies    Current Meds:   Standng Meds:  albuterol/ipratropium for Nebulization 3 milliLiter(s) Nebulizer every 6 hours  calcitriol  Solution 0.25 MICROGram(s) Oral daily  calcium carbonate    500 mG (Tums) Chewable 3 Tablet(s) Chew every 8 hours  caspofungin IVPB 50 milliGRAM(s) IV Intermittent every 24 hours  chlorhexidine 4% Liquid 1 Application(s) Topical <User Schedule>  cyanocobalamin 1000 MICROGram(s) Oral daily  gabapentin 300 milliGRAM(s) Oral at bedtime  influenza   Vaccine 0.5 milliLiter(s) IntraMuscular once  insulin regular Infusion 4 Unit(s)/Hr (4 mL/Hr) IV Continuous <Continuous>  linezolid  IVPB 600 milliGRAM(s) IV Intermittent every 12 hours  magnesium sulfate  IVPB 2 Gram(s) IV Intermittent every 2 hours  methylPREDNISolone sodium succinate Injectable 60 milliGRAM(s) IV Push every 8 hours  montelukast 10 milliGRAM(s) Oral at bedtime  multivitamin/minerals 1 Tablet(s) Oral daily  mupirocin 2% Ointment 1 Application(s) Topical two times a day  pantoprazole   Suspension 40 milliGRAM(s) Oral daily    PRN Meds:  acetaminophen   Tablet .. 650 milliGRAM(s) Oral every 6 hours PRN Temp greater or equal to 38C (100.4F)  oxyCODONE    IR 10 milliGRAM(s) Oral every 8 hours PRN Severe Pain (7 - 10)  oxyCODONE    IR 5 milliGRAM(s) Oral every 6 hours PRN breakthrough pain    HOME MEDICATIONS:  acetaminophen 500 mg oral tablet: 2 tab(s) orally every 8 hours  amLODIPine 10 mg oral tablet: 1 tab(s) orally once a day (at bedtime)  budesonide 3 mg oral capsule, extended release: 1 cap(s) orally 2 times a day  ferrous sulfate 325 mg (65 mg elemental iron) oral delayed release tablet: 1 tab(s) orally once a day  furosemide 20 mg oral tablet: 1 tab(s) orally once a day  gabapentin 300 mg oral capsule: 1 cap(s) orally once a day (at bedtime)  ibuprofen 400 mg oral tablet: 1 tab(s) orally every 6 hours, As needed, Moderate Pain (4 - 6)  ipratropium-albuterol 0.5 mg-2.5 mg/3 mLinhalation solution: 3 milliliter(s) inhaled every 6 hours, As Needed  lidocaine 5% topical film: Apply topically to affected area once a day  Metoprolol Tartrate 50 mg oral tablet: 1 tab(s) orally once a day  montelukast 10 mg oral tablet: 1 tab(s) orally once a day (at bedtime)  Multiple Vitamins with Minerals oral tablet: 1 tab(s) orally once a day  oxyCODONE 10 mg oral tablet: 1 tab(s) orally every 6 hours, As needed, Severe Pain (7 - 10)  predniSONE 20 mg oral tablet: 3 tab(s) orally once a day  ProAir HFA 90 mcg/inh inhalation aerosol: 1 puff(s) inhaled every 4 hours, As Needed  risedronate 150 mg oral tablet: 1 tab(s) orally once a month  tacrolimus 0.5 mg oral capsule: 1 cap(s) orally every 12 hours  tiotropium 18 mcg inhalation capsule: 1 cap(s) inhaled once a day, As Needed  Vitamin B12 1000 mcg oral tablet: 1 tab(s) orally once a day  Vitamin C 1000 mg oral tablet: 1 tab(s) orally once a day  warfarin 4 mg oral tablet: 1 tab(s) orally once a day (at bedtime)      Vital Signs:   T(F): 97.6 (02-20-20 @ 08:00), Max: 98 (02-19-20 @ 16:00)  HR: 76 (02-20-20 @ 09:00) (66 - 124)  BP: 121/71 (02-20-20 @ 09:00) (119/65 - 216/105)  RR: 14 (02-20-20 @ 09:00) (10 - 45)  SpO2: 98% (02-20-20 @ 09:00) (90% - 98%)      02-19-20 @ 07:01  -  02-20-20 @ 07:00  --------------------------------------------------------  IN: 3895 mL / OUT: 1500 mL / NET: 2395 mL        Physical Exam:   CONSTITUTIONAL: NAD, lethargic   HEAD: Normocephalic; atraumatic, NG+  EYES: PERRL, EOMI, no conjunctival erythema  CARD: +S1, S2 Regular rate and rhythm  RESP: b/l ronchi  ABD: soft nontender, distended, no rebound, no guarding, no rigidity,   EXT: edema on limbs, wounds on b/l arms   NEURO: opens eyes, does follows commands, to weak to move limbs           Labs:                         8.8    16.54 )-----------( 108      ( 20 Feb 2020 06:40 )             26.4     Neutophil% 95.7, Lymphocyte% 2.1, Monocyte% 1.3, Bands% 0.8 02-20-20 @ 06:40    20 Feb 2020 06:40    140    |  103    |  69     ----------------------------<  211    4.7     |  23     |  1.1      Ca    7.4        20 Feb 2020 06:40  Phos  3.5       20 Feb 2020 06:40  Mg     1.4       20 Feb 2020 06:40    TPro  4.6    /  Alb  2.5    /  TBili  1.2    /  DBili  x      /  AST  26     /  ALT  45     /  AlkPhos  202    20 Feb 2020 06:40    Culture - Blood (collected 02-17-20 @ 05:17)  Source: .Blood None  Preliminary Report (02-18-20 @ 14:02):    No growth to date.    Culture - Blood (collected 02-15-20 @ 04:57)  Source: .Blood None  Preliminary Report (02-16-20 @ 18:01):    No growth to date.    Culture - Blood (collected 02-14-20 @ 04:30)  Source: .Blood Blood-Peripheral  Final Report (02-19-20 @ 14:00):    No growth at 5 days.        Radiology:

## 2020-02-20 NOTE — PROGRESS NOTE ADULT - ASSESSMENT
IMPRESSION:    Acute hypoxic respiratory failure   DAH resolved   Influenza A treated   ARDS  Candidemia   REJI improving   HO Autoimmune hepatitis on tacrolimus and chronic steroids   h/o hypercoagulable state/ vte was on coumadin   Left great saphenous vein thrombosis chronic    PLAN:    CNS:  Keep off sedation.  FU MS.      HEENT: ET care.  Oral care.      PULMONARY:  HOB @ 45 degrees. lower Solumedrol 60 mg q8 hrs for now. Wean O2. Aspiration precautions.    low threshold for intubation    NIV  at night and as needed   CARDIOVASCULAR:  I=O.  d/c  D5 W   NG feed   free water 250 cc Q 4 hrs     GI: GI prophylaxis.  NG feeding    RENAL:  Follow up lytes. Replete as needed.  FU with Renal.  FU lytes 6 pm  BMP afternoon     INFECTIOUS DISEASE: Follow up cultures.  Continue Anti fungal. Zyvox     HEMATOLOGICAL:  DVT prophylaxis.  fu cbc     ENDOCRINE:  Follow up FS.  Insulin protocol if needed.    MUSCULOSKELETAL: Bed rest     bed in chair Mode     Code status: full     Prognosis: Poor prognosis     Continue MICU monitoring for now. IMPRESSION:    Acute hypoxic respiratory failure   DAH resolved   Influenza A treated   ARDS  Candidemia   REJI improving   HO Autoimmune hepatitis on tacrolimus and chronic steroids   h/o hypercoagulable state/ vte was on coumadin   Left great saphenous vein thrombosis chronic    PLAN:    CNS:  Keep off sedation.  FU MS. VILLAGOMEZENT: ET care.  Oral care.      PULMONARY:  HOB @ 45 degrees. lower Solumedrol 60 mg q8 hrs for now. Wean O2. Aspiration precautions.    low threshold for intubation    NIV  at night and as needed   CARDIOVASCULAR:  I=O.  d/c  D5 W   NG feed   free water 250 cc Q 4 hrs   daily EKG   GI: GI prophylaxis.  NG feeding    RENAL:  Follow up lytes. Replete as needed.  FU with Renal.  FU lytes 6 pm  BMP afternoon     INFECTIOUS DISEASE: Follow up cultures.  Continue Anti fungal. Zyvox     HEMATOLOGICAL:  DVT prophylaxis.  fu cbc     ENDOCRINE:  Follow up FS.  Insulin protocol if needed.    MUSCULOSKELETAL: Bed rest     bed in chair Mode     Code status: full     Prognosis: Poor prognosis   place peripheral line and d/c TLC   Continue MICU monitoring for now.

## 2020-02-20 NOTE — PROGRESS NOTE ADULT - ASSESSMENT
74y female with PMH of asthma, COPD not on home O2, autoimmune hepatitis on prednisone and tacrolimus, heterozygous prothrombin gene mutation with hx of PE and DVT on coumadin presents to the hospital complaining of cough, hemoptysis, SOB and desaturation to 70s. Pt was found to be flu+ likely viral PNA complicated by diffuse alveolar hemorrhage and ARDS . Hospital course complicated by DVT in L saphenous vein with possible DVT and REJI likely secondary to ATN due to vanco.      #ARDS  1-alveolar hemorrhage   2-flu +ve with post viral pneumonia resolved   3-possible PE: cant start AC due to alveolar hemorrhage    - s/p DDAVP 4 doses   - Solumedrol  - Lasix 40   - s/p cefepime and Levaquin (competed 7 days yesterday) - off Vanc >> kidneys toxicity  - completed oseltamivir 30 mg   - C/w Duoneb   - Bronch results - neg culture, neg fungal, cytology positive for inflammatory cells, no organisms  - Repeat Bronch 2/16 showed bleeding from DAH    #Candidemia   - likely A line is source , found to be foul-smelling and surrounded by pus during removal  - blood cultures grew candida  - cultures neg since 2/14  - daily blood cultures   - sputum cx grew MRSA   - s/p caspofungin, oral fluconazole 14 days total   - Zyvox 7 days total   - ID following  - trend CBC and EKG daily     #REJI oliguric likely ATN with Vanc toxicity   - Resolved  - PTH elevated     - calcitriol 0.25  - daily BMP, f/u lytes       # 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  - duplex shows DVT consistent with prior  - might need IVC filter  - not candidate for AC at this time due to alveolar hemorrhage     #Immunosuppressed: Autoimmune Hepatitis   - continue with steroid and tacrolimus  - prednisone 60 mg PO qd at home now on solumedrol   - CMV PCR neg this admission     # HTN  - holding amlodipine 10 and lopressor 50   - Monitor BPs    # heterozygous prothrombin gene mutation with hx of PE and DVT on coumadin  - holding coumadin for now due to alveolar hemorrhage   - monitor coags    #0.5 cm of GB polyp  -needs f/u US in 12 months     # Hx of asthma  - C/w montelukast qd    # GI PPx: Protonix 40 mg PO qd  # DVT PPx: sequentials to right leg only  # Activity: bedrest   # Dispo: ICU  # Code Status: FULL

## 2020-02-20 NOTE — PROGRESS NOTE ADULT - ASSESSMENT
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   - weaned from vent => back on Hi flow NC  - remains on IV Solu-medrol  - antibiotics and antiviral completed for initial infection  - now with MRSA VAP  on Zyvox for 10 days total  - continue Duoneb q6h and q4h PRN  - ID and pulm f/u appreciated    Candedemia  - central line change noted  - cultures negative x2  - 2D echo no evidence of vegatation  - ophth evaluation noted, no clinical evidence, low suspicion for ocular fungemia/endogenous endophthalmitis   - on Caspofungin  as per ID recommendations thru 2/27    REJI on CKD 3  - creatinine trending down  - renal f/u noted  - urine output noted  - continue to monitor  - follow renal recommendations    Autoimmune hepatitis  - On prednisone 60 mg PO qd and Tacrolimus at home  - now on Solu-medrol  - Tacrolimus held    HTN  - now off Labetolol    Heterozygous prothrombin gene mutation with hx of PE and DVT on coumadin:  - Coumadin had been held for alveolar hemorrhage     Hx of asthma  - on Montelukast qd    Multiple Skin tears/ wounds  - local care  - elevate extremities    Acute blood loss, Lower GI bleed  - GI noted  - H/H noted  - rectal tube discontinued    Altered MS, weakness  - neuro followup noted  - EEG negative  - MRI of brain ordered  - ? steroid myopathy    Diet: Enteral feedings resolved, Patient has a new feeding tube  GI PPx: Protonix 40 mg PO qd  DVT PPx: sequentials  Activity: bedrest  Dispo: readmitted to hospital from  rehab at [facility], remains ICU      Continue supportive measures, patient remains acutely ill, with improving mental status. Overall prognosis poor.

## 2020-02-20 NOTE — PROGRESS NOTE ADULT - ASSESSMENT
ASSESSMENT  73y/o F w/ hx of asthma, COPD not on home O2, autoimmune hepatitis on prednisone and tacrolimus, heterozygous prothrombin gene mutation with hx of PE and DVT on coumadin with recent hospitalization in January 2020 with a diagnosis of pneumonia presents for worsening SOB, hemoptysis and cough.    IMPRESSION  #MRSA VAP    2/14 tracheal cx   Moderate Methicillin resistant Staphylococcus aureus  #Candidemia Fungitell: >500 (02-11-20 @ 11:21)    2/11 BCX +, 2/12 BCX +, 2/13 BCX (-) RIJ 2/11. Groin a-line 2/4- removed 2/13     Fungitell: <31 (02-04-20 @ 04:40), Fungitell: 49 (02-01-20 @ 11:17)    Ophtho- no endophthalmitis   #Flu + AH1, Alveolar hemorrhage, ?superimposed atypical PNA (imaging not really consistent with bacterial PNA). Recent bronch 1/20 negative for PCP, Fungal, etc, previous bronch cx with yeast (likely oral contaminant) since GMS neg    2/3 Bronch   No organisms seen, 2/3 cytopath Rare atypical cells, few ciliated bronchial cells, alveolar macrophages and inflammatory cells, mainly neutrophils.    Strep urine Ag neg, serum crypto Ag neg, CMV PCR undetectable, AFB neg    Quantiferon indeterminate    Lactate Dehydrogenase, Serum: 607 (02.03.20 @ 04:30)    Procalcitonin, Serum: 1.86:    CTA chest showing no PE but showing interval development of multifocal bilateral groundglass opacities as well as new moderate to severe bronchiectasis in the right lung,   1/13 CT b/l GGOs, compatible with intersitial edema      Serum Pro-Brain Natriuretic Peptide: 722 pg/mL (01.31.20 @ 09:25)<-- Serum Pro-Brain Natriuretic Peptide: 345 pg/mL (01.13.20 @ 12:10)    During last hospitalization: bronch cx bacterial NG, +yeast, pending ID, neg legionella, + MRSA PCR    MRSA PCR Result.: Positive (02-01-20 @ 20:40)  #Severe sepsis on admission P>90, WBC 19, RR>20, lactic acidosis Lactate, Blood: 2.9 mmol/L (01-31-20 @ 09:25)    afebrile   #Elevated Alk ph, transaminitis  #LLE wound, not infection     12/7 WCX MSSA, Kleb, bacteroides    8/2019 MRSA in a wound   #Immunosuppressed: autoimmune hepatitis on prednisone and tacrolimus    RECOMMENDATIONS  - Linezolid 600mg q12h IV (monitor CBC) 2/17- D4, would plan on 7-10 day course, can change to PO 600mg BID  - Caspo 50mg daily to complete 14 days from cleared BCx end 2/27 (can complete course with PO fluc 400mg daily)  - Trend WBC  - completed oseltamivir & abx s/p cefepime/levaquin  - on methylPREDNISolone sodium succinate Injectable 80    Spectra [phone]

## 2020-02-20 NOTE — ED PROVIDER NOTE - NS ED ROS FT
Subjective     Almashaun Moore is a 68 year old female who presents to clinic today for the following health issues:    HPI     Diarrhea      Duration: x 3 days    Had a little on trip to [place] but better right away with cipro     Home now for a wk     Description:       Consistency of stool: watery-to uncontrollable        Blood in stool: no        Number of loose stools past 24 hours:8-10    Intensity:  severe    Accompanying signs and symptoms:       Fever: YES       Nausea/vomitting: YES       Abdominal pain: YES       Weight loss: no     History (recent antibiotics or travel/ill contacts/med changes/testing done): None    Precipitating or alleviating factors: None    Therapies tried and outcome: PeptoBismol and Tylenol/No Relief    Home from 2 weeks in [place] on 2-11-20    Onset 2-17-20    Ate old food 2-16-20: corn casserole and 2-17 old pizza for 4-5 d /no     Musculoskeletal problem/pain:Contusion RT Knee with abrasion and minimal cellulitis-now resolving       Duration: 02/10/2020    Description  Location: RT Knee-fell o n carpetiing directly onRt ant knee     Intensity:  Mild & getting better with down swelling     Accompanying signs and symptoms: abrasion/contusion     History  Previous similar problem: no   Previous evaluation:  none    Precipitating or alleviating factors:  Trauma or overuse: YES- Had a Fall  Aggravating factors include: Tender to the Touch    Therapies tried and outcome: Bandage& ice     Warm soaks     FREQUENT FALLS AND LOSS OF BALANCE      -SINCE 2013 CHEMO FOR ovarian ca     -used to do [employer] but doesn't now     -has had repeated falls -some with concussion     --see above     BP Readings from Last 2 Encounters:   05/29/18 120/70   02/15/18 112/80     Hemoglobin A1C (%)   Date Value   06/22/2017 5.4   04/08/2016 5.5     LDL Cholesterol Calculated (mg/dL)   Date Value   12/14/2017 59   06/22/2017 81     Hyperlipidemia:LDL Follow-Up      Rate your low fat/cholesterol  diet?: good    Taking statin?  Yes, no muscle aches from 40mgm atorvastatin    Other lipid medications/supplements?:  None    LDL normal in 6-19    PROTEIN-CALORIE MALNUTRITION       -BMI  Low at 19    - stable     -active pt     Insomnia      Duration: lifelong    Thinks worse since 2-10-20 fall     Description  Frequency of insomnia:  nightly  Time to fall asleep: 45 minutes  Middle of night awakening:  nightly  Early morning awakening:  nightly    Accompanying signs and symptoms:  pain and anxiety    History  Similar episodes in past:  YES  Previous evaluation/sleep study:  no     Precipitating or alleviating factors:  New stressful situation: YES-knee injury   Caffeine intake after lunchtime: no  But did in [place] for 2 weeks --home 2-11-20  OTC decongestants: no   Any new medications: no     Therapies tried and outcome: none    Has an Rx for trazodone 50mgm --thinks that it gave her upset stomach last time she used it          How many servings of fruits and vegetables do you eat daily?  2-3    On average, how many sweetened beverages do you drink each day (Examples: soda, juice, sweet tea, etc.  Do NOT count diet or artificially sweetened beverages)?   1    How many days per week do you exercise enough to make your heart beat faster? 4    How many minutes a day do you exercise enough to make your heart beat faster? 30 - 60    How many days per week do you miss taking your medication? 0                Reviewed and updated as needed this visit by Provider         Review of Systems   ROS COMP: CONSTITUTIONAL: NEGATIVE for fever, chills, change in weight  INTEGUMENTARY/SKIN: NEGATIVE for worrisome rashes, moles or lesions POS abrasion with scab and red around it -Rt knee  EYES: NEGATIVE for vision changes or irritation  ENT/MOUTH: NEGATIVE for ear, mouth and throat problems  RESP: NEGATIVE for significant cough or SOB  BREAST: NEGATIVE for masses, tenderness or discharge  CV: NEGATIVE for chest pain,  "palpitations or peripheral edema  GI: POSITIVE for , abdominal pain generalized, diarrhea, gas or bloating, nausea and poor appetite  : NEGATIVE for frequency, dysuria, or hematuria  MUSCULOSKELETAL: NEGATIVE for significant arthralgias or myalgia  NEURO: NEGATIVE for weakness, dizziness or paresthesias  ENDOCRINE: NEGATIVE for temperature intolerance, skin/hair changes  HEME: NEGATIVE for bleeding problems  PSYCHIATRIC: NEGATIVE for changes in mood or affect      Objective    /60   Pulse 92   Temp 99  F (37.2  C) (Tympanic)   Resp 14   Ht 1.575 m (5' 2\")   Wt 47.2 kg (104 lb)   LMP  (LMP Unknown)   SpO2 97%   Breastfeeding No   BMI 19.02 kg/m    Body mass index is 19.02 kg/m .  Physical Exam   GENERAL: healthy, alert and no distress POS thin   EYES: Eyes grossly normal to inspection, PERRL and conjunctivae and sclerae normal  RESP: lungs clear to auscultation - no rales, rhonchi or wheezes  ABDOMEN: soft, nontender, no hepatosplenomegaly, no masses and bowel sounds normal  MS: no gross musculoskeletal defects noted, no edema  SKIN: no suspicious lesions or rashes POS abrasion with scab x 1 cm and red around itx 3 cm -fading  -Rt knee prepatellar   NEURO: Normal strength and tone, mentation intact and speech normal  PSYCH: mentation appears normal, affect normal/bright, anxious, judgement and insight intact and appearance well groomed    Diagnostic Test Results:  Labs reviewed in Epic        Assessment & Plan       ICD-10-CM    1. Diarrhea of presumed infectious origin for 3 days  after eating old food  R19.7 Enteric Bacteria and Virus Panel by [facility]IEN Stool   2. Contusion of right knee, - on carpet on2-10-20 w healing abrasion S80.01XA           Patient Instructions   1. To help heal the  High acid causing your heartburn, Please do not eat any acid, including oranges and citric acid fruits, any form of tomato, caffeine in coffee, tea and pop, no NSAIDs including aleve, advil, ibuprofen, and naprosyn NO " alcohol. No vitamin C  Which is ascorbic acid   Eat your last food and drink> 5 hrs prior to bedtime  And sleep sitting upright    we may prescribe an acid inhibitor that stops the stomach from producing acid. eg omeprazole   You can take as much as you want of a liquid antacid over the counter --simple take 1-2 Tbsp when needed     NO DAIRY !!!    EAT SOLID  Carbs, chicken broth,bananas     duolingo       Return in about 1 month (around 3/20/2020) for cholesterol, Physical Exam.    Vale Wiseman MD  [facility]         Cardiac:  No chest pain, SOB or edema. No chest pain with exertion.  Respiratory:  No cough   MS:  No myalgia, muscle weakness, joint pain or back pain.  Neuro:  No headache or weakness.  No LOC.  Endocrine: No history of thyroid disease or diabetes.

## 2020-02-20 NOTE — PROGRESS NOTE ADULT - SUBJECTIVE AND OBJECTIVE BOX
Patient is a 75y old  Female who presents with a chief complaint of SOB and desaturation (19 Feb 2020 14:49)      Over Night Events:  Patient seen and examined. more awake talking   bld cx negative     ROS:  See HPI    PHYSICAL EXAM    ICU Vital Signs Last 24 Hrs  T(C): 36.1 (20 Feb 2020 04:00), Max: 36.7 (19 Feb 2020 16:00)  T(F): 96.9 (20 Feb 2020 04:00), Max: 98 (19 Feb 2020 16:00)  HR: 102 (20 Feb 2020 06:30) (66 - 114)  BP: 169/78 (20 Feb 2020 06:30) (119/65 - 182/82)  BP(mean): 125 (20 Feb 2020 06:30) (89 - 139)  ABP: --  ABP(mean): --  RR: 18 (20 Feb 2020 06:30) (10 - 45)  SpO2: 94% (20 Feb 2020 06:30) (90% - 99%)      General: Awake oriented   HEENT:  jose              Lymph Nodes: NO cervical LN   Lungs: Bilateral BS  Cardiovascular: Regular   Abdomen: Soft, Positive BS  Extremities: No clubbing   Skin: warm   Neurological: weakness b/l   Musculoskeletal: move all ext     I&O's Detail    19 Feb 2020 07:01  -  20 Feb 2020 07:00  --------------------------------------------------------  IN:    dextrose 5%.: 1050 mL    Enteral Tube Flush: 50 mL    Free Water: 1250 mL    insulin regular Infusion: 105 mL    IV PiggyBack: 550 mL    Nepro with Carb Steady: 890 mL  Total IN: 3895 mL    OUT:    Voided: 1500 mL  Total OUT: 1500 mL    Total NET: 2395 mL          LABS:                          8.8    16.54 )-----------( 108      ( 20 Feb 2020 06:40 )             26.4         20 Feb 2020 06:40    140    |  103    |  69     ----------------------------<  211    4.7     |  23     |  1.1      Ca    7.4        20 Feb 2020 06:40  Phos  3.5       20 Feb 2020 06:40  Mg     1.4       20 Feb 2020 06:40    TPro  4.6    /  Alb  2.5    /  TBili  1.2    /  DBili  x      /  AST  26     /  ALT  45     /  AlkPhos  202    20 Feb 2020 06:40  Amylase x     lipase x                                                                                                                                                                                                                                         MEDICATIONS  (STANDING):  albuterol/ipratropium for Nebulization 3 milliLiter(s) Nebulizer every 6 hours  calcitriol  Solution 0.25 MICROGram(s) Oral daily  calcium carbonate    500 mG (Tums) Chewable 3 Tablet(s) Chew every 8 hours  caspofungin IVPB 50 milliGRAM(s) IV Intermittent every 24 hours  chlorhexidine 4% Liquid 1 Application(s) Topical <User Schedule>  cyanocobalamin 1000 MICROGram(s) Oral daily  gabapentin 300 milliGRAM(s) Oral at bedtime  influenza   Vaccine 0.5 milliLiter(s) IntraMuscular once  insulin regular Infusion 4 Unit(s)/Hr (4 mL/Hr) IV Continuous <Continuous>  linezolid  IVPB 600 milliGRAM(s) IV Intermittent every 12 hours  methylPREDNISolone sodium succinate Injectable 80 milliGRAM(s) IV Push every 6 hours  montelukast 10 milliGRAM(s) Oral at bedtime  multivitamin/minerals 1 Tablet(s) Oral daily  mupirocin 2% Ointment 1 Application(s) Topical two times a day  pantoprazole   Suspension 40 milliGRAM(s) Oral daily    MEDICATIONS  (PRN):  acetaminophen   Tablet .. 650 milliGRAM(s) Oral every 6 hours PRN Temp greater or equal to 38C (100.4F)  oxyCODONE    IR 10 milliGRAM(s) Oral every 8 hours PRN Severe Pain (7 - 10)  oxyCODONE    IR 5 milliGRAM(s) Oral every 6 hours PRN breakthrough pain          Xrays:  TLC:  OG:  ET tube:                                                                                    decrease b/l opacity    ECHO:  [facility] ICU:

## 2020-02-21 NOTE — PROGRESS NOTE ADULT - ASSESSMENT
74y female with PMH of asthma, COPD not on home O2, autoimmune hepatitis on prednisone and tacrolimus, heterozygous prothrombin gene mutation with hx of PE and DVT on coumadin presents to the hospital complaining of cough, hemoptysis, SOB and desaturation to 70s. Pt was found to be flu+ likely viral PNA complicated by diffuse alveolar hemorrhage and ARDS . Hospital course complicated by DVT in L saphenous vein with possible DVT and REJI likely secondary to ATN due to vanco.      #ARDS  1-alveolar hemorrhage   2-flu +ve with post viral pneumonia resolved   3-possible PE: cant start AC due to alveolar hemorrhage    - s/p DDAVP 4 doses   - Solumedrol  - Lasix 40   - s/p cefepime and Levaquin (competed 7 days yesterday) - off Vanc >> kidneys toxicity  - completed oseltamivir 30 mg   - C/w Duoneb   - Bronch results - neg culture, neg fungal, cytology positive for inflammatory cells, no organisms  - Repeat Bronch 2/16 showed bleeding from DAH    #Candidemia   - likely A line is source , found to be foul-smelling and surrounded by pus during removal  - blood cultures grew candida  - cultures neg since 2/14  - sputum cx grew MRSA   - oral fluconazole 14 days total   - Zyvox 7 days total   - ID following  - trend CBC and EKG daily (QTc)     #Rectal Bleeding   - GI following   - Protonix BID  - FlexSig today   - keep HGB above 8  - active type and screen   - H/H stable    #REJI oliguric likely ATN with Vanc toxicity   - Resolved    # 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  - duplex shows DVT consistent with prior  - might need IVC filter  - not candidate for AC at this time due to alveolar hemorrhage     #Immunosuppressed: Autoimmune Hepatitis   - continue with steroid and tacrolimus  - prednisone 60 mg PO qd at home now on solumedrol   - CMV PCR neg this admission     # HTN  - holding amlodipine 10 and lopressor 50   - Monitor BPs    # heterozygous prothrombin gene mutation with hx of PE and DVT on coumadin  - holding coumadin for now due to alveolar hemorrhage   - monitor coags    #0.5 cm of GB polyp  -needs f/u US in 12 months     # Hx of asthma  - C/w montelukast qd    #Deconditioning   - pt severely deconditioned due to laying in bed and chronic steroid use   - PT/OT/Rehab once downgraded     # GI PPx: Protonix 40 mg PO qd  # DVT PPx: sequentials to right leg only  # Activity: bedrest   # Dispo: ICU  # Code Status: FULL

## 2020-02-21 NOTE — CONSULT NOTE ADULT - ASSESSMENT
75y/o F w/ hx of asthma, COPD not on home O2, autoimmune hepatitis on prednisone and tacrolimus, heterozygous prothrombin gene mutation with hx of PE and DVT on coumadin ( now off anticoagulation ) admitted with SOB found to have ROBBY , acute hypoxic respiratory failure secondary to influenza A and HCAP  , candidemia , and REJI. Patient had digni shield. GI are re consulted for small amount of fresh blood per rectum and blood clots this AM.  patient is extubated with digni shield in place.    # Fresh blood per rectum with blood clots:   - DDx includes: lower GI bleed secondary to rectal ulcer from recent digni shield, hemorrhoids / malignancy/ AVM   - patient is hemodynamically stable   - Hb this AM 8 from 8.8 yesterday , admitted with Hb of 14 and has been slowly trending down, baseline for past 2 weeks between 8 - 9.   - MCV 88 ,     drop in HGB  one unit , corrected appropriately after transfusion     REC:   Digni shield removal   keep HGB above 8  active type and screen   CBC q 6 hrs   2 large gauge IV   Given patient  respiratory condition and comorbidities , patient  will benefit from colonoscopy on elective basis once she is more clinically optimized unless worsening GI bleed with hemodynamic instability or  significant drop in HGB then , will do flexible sigmoidoscopy on urgent basis     #0.5 cm gallbladder polyp on US:   repeat US abdomen in 6 months     f/u with Dr. Cheema / reggie  on Monday 75y/o F w/ hx of asthma, COPD not on home O2, autoimmune hepatitis on prednisone and tacrolimus, heterozygous prothrombin gene mutation with hx of PE and DVT on coumadin ( now off anticoagulation ) admitted with SOB, patient has been treated for Flu + AH1, ?superimposed atypical PNA, Acute hypoxic respiratory failure s/p extubation on 2/16 currently on high flow  40%, DAH resolved , Candidemia on fluconazole, REJI improving. GI are re consulted for small amount of fresh blood per rectum and blood clots this AM.    # Fresh blood per rectum with blood clots:   - DDx includes: lower GI bleed secondary to rectal ulcer from recent digni shield, hemorrhoids / malignancy/ AVM   - patient is hemodynamically stable   - Hb this AM 8 from 8.8 yesterday with MCV 88 , admitted with Hb of 14 and has been slowly trending down, baseline for past 2 weeks between 8 - 9.   - received 4 pRBC this admission    REC:   - keep HGB above 8  - active type and screen   - CBC q 8 hrs   - 2 large gauge IV   - patient with need EGD / colonoscopy on elective basis once she is more medically optimized unless worsening GI bleed with hemodynamic instability or  significant drop in HGB then , will consider urgent intervention     # Autoimmune hepatitis:  - was on prednisone and tacrolimus as outpatient  -  with normal AST/ALT  - INR 4 days ago 1.27  - currently on solumedrol 60 q 12 hours, tacrolimus trough was high pending repeat    # 0.5 cm gallbladder polyp on US:   - repeat US abdomen in 6 months     to be discussed with attending 75y/o F w/ hx of asthma, COPD not on home O2, autoimmune hepatitis on prednisone and tacrolimus, heterozygous prothrombin gene mutation with hx of PE and DVT on coumadin ( now off anticoagulation ) admitted with SOB, patient has been treated for Flu + AH1, ?superimposed atypical PNA, Acute hypoxic respiratory failure s/p extubation on 2/16 currently on high flow  40%, DAH resolved , Candidemia on fluconazole, REJI improving. GI are re consulted for small amount of fresh blood per rectum and blood clots this AM.    # Fresh blood per rectum with blood clots:   - DDx includes: lower GI bleed secondary to rectal ulcer from recent digni shield, hemorrhoids / malignancy/ AVM   - patient is hemodynamically stable   - Hb this AM 8 from 8.8 yesterday with MCV 88, admitted with Hb of 14 and has been slowly trending down, baseline for past 2 weeks between 8 - 9.   - received 4 pRBC this admission    REC:   - keep HGB above 8  - active type and screen   - CBC q 8 hrs   - 2 large gauge IV   - will proceed with bedside flex sigmoidoscopy today    # Autoimmune hepatitis:  - was on prednisone and tacrolimus as outpatient  -  with normal AST/ALT  - INR 4 days ago 1.27  - currently on solumedrol 60 q 12 hours, tacrolimus trough was high pending repeat, maintain therapeutic level   - follow up with Dr. Muro as outpatient    # 0.5 cm gallbladder polyp on US:   - repeat US abdomen in 6 months     Discussed with the Attending  Will follow 75y/o F w/ hx of asthma, COPD not on home O2, autoimmune hepatitis on prednisone and tacrolimus, heterozygous prothrombin gene mutation with hx of PE and DVT on coumadin ( now off anticoagulation ) admitted with SOB, patient has been treated for Flu + AH1, ?superimposed atypical PNA, Acute hypoxic respiratory failure s/p extubation on 2/16 currently on high flow  40%, DAH resolved , Candidemia on fluconazole, REJI improving. GI are re consulted for small amount of fresh blood per rectum and blood clots this AM.    # Fresh blood per rectum with blood clots:   - DDx includes: lower GI bleed secondary to rectal ulcer from recent digni shield, hemorrhoids / malignancy/ AVM   - patient is hemodynamically stable   - Hb this AM 8 from 8.8 yesterday with MCV 88, admitted with Hb of 14 and has been slowly trending down, baseline for past 2 weeks between 8 - 9.   - received 4 pRBC this admission  - BOSTON with blood clots and fresh blood per rectum     REC:   - keep HGB above 8  - active type and screen   - CBC q 8 hrs   - 2 large gauge IV   - will proceed with bedside flex sigmoidoscopy today    # Autoimmune hepatitis:  - was on prednisone and tacrolimus as outpatient  -  with normal AST/ALT  - INR 4 days ago 1.27  - currently on solumedrol 60 q 12 hours, tacrolimus trough was high pending repeat, maintain therapeutic level   - follow up with Dr. Muro as outpatient    # 0.5 cm gallbladder polyp on US:   - repeat US abdomen in 6 months     Discussed with the Attending  Will follow 75y/o F w/ hx of asthma, COPD not on home O2, autoimmune hepatitis on prednisone and tacrolimus, heterozygous prothrombin gene mutation with hx of PE and DVT on coumadin ( now off anticoagulation ) admitted with SOB, patient has been treated for Flu + AH1, ?superimposed atypical PNA, Acute hypoxic respiratory failure s/p extubation on 2/16 currently on high flow  40%, DAH resolved , Candidemia on fluconazole, REJI improving. GI are re consulted for small amount of fresh blood per rectum and blood clots this AM.    # Fresh blood per rectum with blood clots:   - DDx includes: lower GI bleed secondary to rectal ulcer from recent digni shield, hemorrhoids / malignancy/ AVM   - patient is hemodynamically stable   - Hb this AM 8 from 8.8 yesterday with MCV 88, admitted with Hb of 14 and has been slowly trending down, baseline for past 2 weeks between 8 - 9.   - received 4 pRBC this admission  - BOSTON with blood clots and fresh blood per rectum     REC:   - keep HGB above 8  - active type and screen   - CBC q 8 hrs   - 2 large gauge IV   - will proceed with bedside flex sigmoidoscopy today  risks and benefits d/w pt and  at the bedside    # Autoimmune hepatitis:  - was on prednisone and tacrolimus as outpatient  -  with normal AST/ALT  - INR 4 days ago 1.27  - currently on solumedrol 60 q 12 hours, tacrolimus trough was high pending repeat, maintain therapeutic level   - follow up with Dr. Washington as outpatient    # 0.5 cm gallbladder polyp on US:   - repeat US abdomen in 6 months

## 2020-02-21 NOTE — PROGRESS NOTE ADULT - SUBJECTIVE AND OBJECTIVE BOX
Hospital Day:  21d    Subjective:    Pt was interviewed and examined at the bedside in the AM. No acute events overnight.   Denies any complaints. Progressively better.     Past Medical Hx:   Prothrombin gene mutation  Autoimmune hepatitis  Other chronic pulmonary embolism without acute cor pulmonale  Essential hypertension  Autoimmune hepatitis treated with steroids  Asthma  DVT (deep venous thrombosis)    Past Sx:  S/P debridement  History of back surgery  No significant past surgical history    Allergies:  No Known Allergies    Current Meds:   Standng Meds:  albuterol/ipratropium for Nebulization 3 milliLiter(s) Nebulizer every 6 hours  calcitriol  Solution 0.25 MICROGram(s) Oral daily  calcium carbonate    500 mG (Tums) Chewable 3 Tablet(s) Chew every 8 hours  chlorhexidine 4% Liquid 1 Application(s) Topical <User Schedule>  cyanocobalamin 1000 MICROGram(s) Oral daily  fluconAZOLE   Tablet 400 milliGRAM(s) Oral daily  gabapentin 300 milliGRAM(s) Oral at bedtime  influenza   Vaccine 0.5 milliLiter(s) IntraMuscular once  insulin regular Infusion 4 Unit(s)/Hr (4 mL/Hr) IV Continuous <Continuous>  linezolid    Tablet 600 milliGRAM(s) Oral every 12 hours  methylPREDNISolone sodium succinate Injectable 60 milliGRAM(s) IV Push two times a day  montelukast 10 milliGRAM(s) Oral at bedtime  multivitamin/minerals 1 Tablet(s) Oral daily  mupirocin 2% Ointment 1 Application(s) Topical two times a day  pantoprazole  Injectable 40 milliGRAM(s) IV Push two times a day    PRN Meds:  acetaminophen   Tablet .. 650 milliGRAM(s) Oral every 6 hours PRN Temp greater or equal to 38C (100.4F)  oxyCODONE    IR 10 milliGRAM(s) Oral every 8 hours PRN Severe Pain (7 - 10)  oxyCODONE    IR 5 milliGRAM(s) Oral every 6 hours PRN breakthrough pain    HOME MEDICATIONS:  acetaminophen 500 mg oral tablet: 2 tab(s) orally every 8 hours  amLODIPine 10 mg oral tablet: 1 tab(s) orally once a day (at bedtime)  budesonide 3 mg oral capsule, extended release: 1 cap(s) orally 2 times a day  ferrous sulfate 325 mg (65 mg elemental iron) oral delayed release tablet: 1 tab(s) orally once a day  furosemide 20 mg oral tablet: 1 tab(s) orally once a day  gabapentin 300 mg oral capsule: 1 cap(s) orally once a day (at bedtime)  ibuprofen 400 mg oral tablet: 1 tab(s) orally every 6 hours, As needed, Moderate Pain (4 - 6)  ipratropium-albuterol 0.5 mg-2.5 mg/3 mLinhalation solution: 3 milliliter(s) inhaled every 6 hours, As Needed  lidocaine 5% topical film: Apply topically to affected area once a day  Metoprolol Tartrate 50 mg oral tablet: 1 tab(s) orally once a day  montelukast 10 mg oral tablet: 1 tab(s) orally once a day (at bedtime)  Multiple Vitamins with Minerals oral tablet: 1 tab(s) orally once a day  oxyCODONE 10 mg oral tablet: 1 tab(s) orally every 6 hours, As needed, Severe Pain (7 - 10)  predniSONE 20 mg oral tablet: 3 tab(s) orally once a day  ProAir HFA 90 mcg/inh inhalation aerosol: 1 puff(s) inhaled every 4 hours, As Needed  risedronate 150 mg oral tablet: 1 tab(s) orally once a month  tacrolimus 0.5 mg oral capsule: 1 cap(s) orally every 12 hours  tiotropium 18 mcg inhalation capsule: 1 cap(s) inhaled once a day, As Needed  Vitamin B12 1000 mcg oral tablet: 1 tab(s) orally once a day  Vitamin C 1000 mg oral tablet: 1 tab(s) orally once a day  warfarin 4 mg oral tablet: 1 tab(s) orally once a day (at bedtime)      Vital Signs:   T(F): 96.4 (02-21-20 @ 12:00), Max: 98.6 (02-21-20 @ 04:00)  HR: 86 (02-21-20 @ 13:00) (70 - 108)  BP: 134/73 (02-21-20 @ 13:00) (111/58 - 196/79)  RR: 48 (02-21-20 @ 13:00) (13 - 48)  SpO2: 96% (02-21-20 @ 13:00) (81% - 100%)      02-20-20 @ 07:01  -  02-21-20 @ 07:00  --------------------------------------------------------  IN: 2700 mL / OUT: 1300 mL / NET: 1400 mL    02-21-20 @ 07:01  -  02-21-20 @ 13:35  --------------------------------------------------------  IN: 30 mL / OUT: 500 mL / NET: -470 mL        Physical Exam:   CONSTITUTIONAL: NAD, lethargic   HEAD: Normocephalic; atraumatic, NG+  EYES: PERRL, EOMI, no conjunctival erythema  CARD: +S1, S2 Regular rate and rhythm  RESP: b/l ronjesus  ABD: soft nontender, distended, no rebound, no guarding, no rigidity,   EXT: edema on limbs, wounds on b/l arms   NEURO: opens eyes, does follows commands, to weak to move limbs             Labs:                         8.0    13.97 )-----------( 104      ( 21 Feb 2020 09:00 )             24.4       21 Feb 2020 09:00    137    |  98     |  70     ----------------------------<  302    3.7     |  23     |  1.0      Ca    7.3        21 Feb 2020 09:00  Phos  3.5       20 Feb 2020 06:40  Mg     1.4       20 Feb 2020 06:40    TPro  4.5    /  Alb  2.6    /  TBili  1.1    /  DBili  x      /  AST  30     /  ALT  41     /  AlkPhos  213    21 Feb 2020 09:00                          Culture - Blood (collected 02-17-20 @ 05:17)  Source: .Blood None  Preliminary Report (02-18-20 @ 14:02):    No growth to date.    Culture - Blood (collected 02-15-20 @ 04:57)  Source: .Blood None  Final Report (02-20-20 @ 18:01):    No growth at 5 days.        Radiology:

## 2020-02-21 NOTE — PROGRESS NOTE ADULT - SUBJECTIVE AND OBJECTIVE BOX
ELDER, DONI  75y  Female  HPI:  75y/o F w/ hx of asthma, COPD not on home O2, autoimmune hepatitis on prednisone and tacrolimus, heterozygous prothrombin gene mutation with hx of PE and DVT on coumadin with recent hospitalization in January 2020 with a diagnosis of pneumonia presents for worsening SOB, hemoptysis and cough. Everything started yesterday night when patient was in bed, started to feel shortness of breath and had many episodes of hemoptysis with clots, she also had substernal chest pain non radiating, moderate in intensity that worsens on coughing. She denies fever but endorses chills. She also admits for lightheadedness that occurred yesterday, no HA, abd pain, dysuria or paresthesias. She had 2 loose bowel movements since yesterday with some blood in the stools that she attributes to her hemorrhoids. She stopped Coumadin couple of days ago since her INR was supratherapeutic. She is from [facility] short term and also mentions that her  and another family member have the flu and she was exposed to them. She did not have the flu shot this season.  In ED, temp was 100.1 rectally, tachycardic ( sinus) , she was saturating to 70s on non rebreather and her SaO2 went up to 95%. ABG showing respiratory alkalosis initially and then corrected after BIPAP placement and correction of RR.  CTA chest showing no PE but showing interval development of multifocal bilateral groundglass opacities as well as new moderate to severe bronchiectasis in the right lung. Findings are concerning for an acute infectious/inflammatory process. (31 Jan 2020 14:36)    MEDICATIONS  (STANDING):  albuterol/ipratropium for Nebulization 3 milliLiter(s) Nebulizer every 6 hours  calcitriol  Solution 0.25 MICROGram(s) Oral daily  calcium carbonate    500 mG (Tums) Chewable 3 Tablet(s) Chew every 8 hours  chlorhexidine 4% Liquid 1 Application(s) Topical <User Schedule>  cyanocobalamin 1000 MICROGram(s) Oral daily  fluconAZOLE   Tablet 400 milliGRAM(s) Oral daily  gabapentin 300 milliGRAM(s) Oral at bedtime  influenza   Vaccine 0.5 milliLiter(s) IntraMuscular once  insulin regular Infusion 4 Unit(s)/Hr (4 mL/Hr) IV Continuous <Continuous>  linezolid    Tablet 600 milliGRAM(s) Oral every 12 hours  methylPREDNISolone sodium succinate Injectable 60 milliGRAM(s) IV Push two times a day  montelukast 10 milliGRAM(s) Oral at bedtime  multivitamin/minerals 1 Tablet(s) Oral daily  mupirocin 2% Ointment 1 Application(s) Topical two times a day  pantoprazole  Injectable 40 milliGRAM(s) IV Push two times a day    MEDICATIONS  (PRN):  acetaminophen   Tablet .. 650 milliGRAM(s) Oral every 6 hours PRN Temp greater or equal to 38C (100.4F)  oxyCODONE    IR 10 milliGRAM(s) Oral every 8 hours PRN Severe Pain (7 - 10)  oxyCODONE    IR 5 milliGRAM(s) Oral every 6 hours PRN breakthrough pain    INTERVAL EVENTS: Patient seen today case discussed with house staff, primary nurse....patient with rectal bleeding and clots this am.  and son at bedside. Patient remains alert, denies pain. Speech clearer today    T(C): 34.4 (02-21-20 @ 08:00), Max: 37 (02-21-20 @ 04:00)  HR: 94 (02-21-20 @ 09:00) (70 - 108)  BP: 152/69 (02-21-20 @ 09:00) (118/66 - 196/79)  RR: 19 (02-21-20 @ 09:00) (13 - 43)  SpO2: 90% (02-21-20 @ 09:00) (81% - 100%)  Wt(kg): --Vital Signs Last 24 Hrs  T(C): 34.4 (21 Feb 2020 08:00), Max: 37 (21 Feb 2020 04:00)  T(F): 94 (21 Feb 2020 08:00), Max: 98.6 (21 Feb 2020 04:00)  HR: 94 (21 Feb 2020 09:00) (70 - 108)  BP: 152/69 (21 Feb 2020 09:00) (118/66 - 196/79)  BP(mean): 84 (21 Feb 2020 09:00) (69 - 143)  RR: 19 (21 Feb 2020 09:00) (13 - 43)  SpO2: 90% (21 Feb 2020 09:00) (81% - 100%)    PHYSICAL EXAM:  GENERAL: NAD, remains on hi-flow nasal O2  NECK: Supple, No JVD  CHEST/LUNG: Decreased BS; No wheezing  HEART: S1, S2, Regular rate and rhythm  ABDOMEN: Soft, Nontender, Nondistended; Bowel sounds present  EXTREMITIES: Decreased edema, allt extremities in ace wraps  SKIN: rash at neck, bruising noted    LABS:  Labs:                        8.0    13.97 )-----------( 104      ( 21 Feb 2020 09:00 )             24.4             02-21    137  |  98  |  70<HH>  ----------------------------<  302<H>  3.7   |  23  |  1.0    Ca    7.3<L>      21 Feb 2020 09:00  Phos  3.5     02-20  Mg     1.4     02-20    TPro  4.5<L>  /  Alb  2.6<L>  /  TBili  1.1  /  DBili  x   /  AST  30  /  ALT  41  /  AlkPhos  213<H>  02-21    LIVER FUNCTIONS - ( 21 Feb 2020 09:00 )  Alb: 2.6 g/dL / Pro: 4.5 g/dL / ALK PHOS: 213 U/L / ALT: 41 U/L / AST: 30 U/L / GGT: x                         RADIOLOGY & ADDITIONAL TESTS:

## 2020-02-21 NOTE — CONSULT NOTE ADULT - SUBJECTIVE AND OBJECTIVE BOX
Hospital Day # 21d    HPI:   73y/o F w/ hx of asthma, COPD not on home O2, autoimmune hepatitis on prednisone and tacrolimus, heterozygous prothrombin gene mutation with hx of PE and DVT on coumadin with recent hospitalization in January 2020 with a diagnosis of pneumonia presents for worsening SOB, hemoptysis and cough. Everything started yesterday night when patient was in bed, started to feel shortness of breath and had many episodes of hemoptysis with clots, she also had substernal chest pain non radiating, moderate in intensity that worsens on coughing. She denies fever but endorses chills. She also admits for lightheadedness that occurred yesterday, no HA, abd pain, dysuria or paresthesias. She had 2 loose bowel movements since yesterday with some blood in the stools that she attributes to her hemorrhoids. She stopped Coumadin couple of days ago since her INR was supratherapeutic. She is from [facility] short term and also mentions that her  and another family member have the flu and she was exposed to them. She did not have the flu shot this season.  In ED, temp was 100.1 rectally, tachycardic ( sinus) , she was saturating to 70s on non rebreather and her SaO2 went up to 95%. ABG showing respiratory alkalosis initially and then corrected after BIPAP placement and correction of RR.  CTA chest showing no PE but showing interval development of multifocal bilateral groundglass opacities as well as new moderate to severe bronchiectasis in the right lung. Findings are concerning for an acute infectious/inflammatory process. (31 Jan 2020 14:36)    GI HPI:  73y/o F w/ hx of asthma, COPD not on home O2, autoimmune hepatitis on prednisone and tacrolimus, heterozygous prothrombin gene mutation with hx of PE and DVT on coumadin admitted with SOB found to have ROBBY , acute hypoxic respiratory failure secondary to influenza A and HCAP  , candidemia , and REJI. Patient had digni shield. patient was seen by GI team 5 days ago for same complain of fresh blood per rectum and clots. patient was hemodynamically stable, and intervention was deferred because of clinical condition and the fact that there was no need for urgent intervention. GI service is reconsulted again for fresh blood per rectum and and passaging of the clots this AM. patient is extubated with digni shield in place. Patient denies abdominal pain, nausea, vomiting, fever , chills. remains on high flow nasal cannula       PAST MEDICAL & SURGICAL HISTORY  Prothrombin gene mutation  Autoimmune hepatitis  Other chronic pulmonary embolism without acute cor pulmonale  Essential hypertension  Autoimmune hepatitis treated with steroids  Asthma  DVT (deep venous thrombosis)  S/P debridement  History of back surgery      FAMILY HISTORY:  No pertinent family history in first degree relatives    SOCIAL HISTORY:  Tobacco:  former smoker   Alcohol: denies   Drugs: denies     ALLERGIES:  No Known Allergies      MEDICATIONS:  MEDICATIONS  (STANDING):  albuterol/ipratropium for Nebulization 3 milliLiter(s) Nebulizer every 6 hours  calcitriol  Solution 0.25 MICROGram(s) Oral daily  calcium carbonate    500 mG (Tums) Chewable 3 Tablet(s) Chew every 8 hours  chlorhexidine 4% Liquid 1 Application(s) Topical <User Schedule>  cyanocobalamin 1000 MICROGram(s) Oral daily  fluconAZOLE   Tablet 400 milliGRAM(s) Oral daily  gabapentin 300 milliGRAM(s) Oral at bedtime  influenza   Vaccine 0.5 milliLiter(s) IntraMuscular once  insulin regular Infusion 4 Unit(s)/Hr (4 mL/Hr) IV Continuous <Continuous>  linezolid    Tablet 600 milliGRAM(s) Oral every 12 hours  methylPREDNISolone sodium succinate Injectable 60 milliGRAM(s) IV Push two times a day  montelukast 10 milliGRAM(s) Oral at bedtime  multivitamin/minerals 1 Tablet(s) Oral daily  mupirocin 2% Ointment 1 Application(s) Topical two times a day  pantoprazole   Suspension 40 milliGRAM(s) Oral daily    MEDICATIONS  (PRN):  acetaminophen   Tablet .. 650 milliGRAM(s) Oral every 6 hours PRN Temp greater or equal to 38C (100.4F)  oxyCODONE    IR 10 milliGRAM(s) Oral every 8 hours PRN Severe Pain (7 - 10)  oxyCODONE    IR 5 milliGRAM(s) Oral every 6 hours PRN breakthrough pain      HOME MEDICATIONS:  Home Medications:  acetaminophen 500 mg oral tablet: 2 tab(s) orally every 8 hours (20 Jan 2020 13:38)  amLODIPine 10 mg oral tablet: 1 tab(s) orally once a day (at bedtime) (20 Jan 2020 13:38)  budesonide 3 mg oral capsule, extended release: 1 cap(s) orally 2 times a day (05 Dec 2019 17:19)  ferrous sulfate 325 mg (65 mg elemental iron) oral delayed release tablet: 1 tab(s) orally once a day (05 Dec 2019 17:19)  furosemide 20 mg oral tablet: 1 tab(s) orally once a day (05 Dec 2019 17:19)  gabapentin 300 mg oral capsule: 1 cap(s) orally once a day (at bedtime) (20 Jan 2020 13:38)  ibuprofen 400 mg oral tablet: 1 tab(s) orally every 6 hours, As needed, Moderate Pain (4 - 6) (20 Jan 2020 13:38)  ipratropium-albuterol 0.5 mg-2.5 mg/3 mLinhalation solution: 3 milliliter(s) inhaled every 6 hours, As Needed (20 Jan 2020 13:40)  lidocaine 5% topical film: Apply topically to affected area once a day (20 Jan 2020 13:38)  Metoprolol Tartrate 50 mg oral tablet: 1 tab(s) orally once a day (05 Dec 2019 17:19)  montelukast 10 mg oral tablet: 1 tab(s) orally once a day (at bedtime) (05 Dec 2019 17:19)  Multiple Vitamins with Minerals oral tablet: 1 tab(s) orally once a day (20 Jan 2020 13:38)  oxyCODONE 10 mg oral tablet: 1 tab(s) orally every 6 hours, As needed, Severe Pain (7 - 10) (20 Jan 2020 13:38)  predniSONE 20 mg oral tablet: 3 tab(s) orally once a day (20 Jan 2020 13:38)  ProAir HFA 90 mcg/inh inhalation aerosol: 1 puff(s) inhaled every 4 hours, As Needed (20 Jan 2020 13:40)  risedronate 150 mg oral tablet: 1 tab(s) orally once a month (05 Dec 2019 17:19)  tacrolimus 0.5 mg oral capsule: 1 cap(s) orally every 12 hours (05 Dec 2019 17:19)  tiotropium 18 mcg inhalation capsule: 1 cap(s) inhaled once a day, As Needed (20 Jan 2020 13:40)  Vitamin B12 1000 mcg oral tablet: 1 tab(s) orally once a day (05 Dec 2019 17:19)  Vitamin C 1000 mg oral tablet: 1 tab(s) orally once a day (05 Dec 2019 17:19)  warfarin 4 mg oral tablet: 1 tab(s) orally once a day (at bedtime) (20 Jan 2020 13:38)      REVIEW OF SYSTEMS:    CONSTITUTIONAL: No fever  EYES/ENT: No scleral icterus  RESPIRATORY: No shortness of breath  CARDIOVASCULAR: No chest pain   GASTROINTESTINAL: as GI HPI  GENITOURINARY: No dysuria  NEUROLOGICAL: No dizziness  SKIN: No cyanosis      VITALS:   T(F): 94 (02-21 @ 08:00), Max: 98.6 (02-21 @ 04:00)  HR: 94 (02-21 @ 09:00) (66 - 124)  BP: 152/69 (02-21 @ 09:00) (105/51 - 216/105)  BP(mean): 84 (02-21 @ 09:00) (69 - 162)  RR: 19 (02-21 @ 09:00) (10 - 45)  SpO2: 90% (02-21 @ 09:00) (81% - 100%)    I&O's Summary    20 Feb 2020 07:01  -  21 Feb 2020 07:00  --------------------------------------------------------  IN: 2700 mL / OUT: 1300 mL / NET: 1400 mL    21 Feb 2020 07:01  -  21 Feb 2020 09:28  --------------------------------------------------------  IN: 6 mL / OUT: 0 mL / NET: 6 mL        Physical Exam:  GENERAL: NAD  HEAD:  Atraumatic  EYES:  conjunctiva and sclera clear  NECK: No thyromegaly  CHEST/LUNG: No accessory use of muscle  HEART: S1S2 Normal  ABDOMEN: Soft, Nontender, Nondistended; Bowel sounds present, no guarding or rigidity.  EXTREMITIES:  No cyanosis  NEURO: AAOx3        LABS:                        8.8    16.54 )-----------( 108      ( 20 Feb 2020 06:40 )             26.4         LIVER FUNCTIONS - ( 20 Feb 2020 06:40 )  Alb: 2.5 g/dL / Pro: 4.6 g/dL / ALK PHOS: 202 U/L / ALT: 45 U/L / AST: 26 U/L / GGT: x           LIVER FUNCTIONS - ( 20 Feb 2020 06:40 )  Alb: 2.5 [3.5 - 5.2] / Pro: 4.6 [6.0 - 8.0] / ALK PHOS: 202 [30 - 115] / ALT: 45 [0 - 41] / AST: 26 [0 - 41] / GGT: x     LIVER FUNCTIONS - ( 19 Feb 2020 06:15 )  Alb: 2.7 [3.5 - 5.2] / Pro: 4.8 [6.0 - 8.0] / ALK PHOS: 229 [30 - 115] / ALT: 56 [0 - 41] / AST: 29 [0 - 41] / GGT: x     LIVER FUNCTIONS - ( 18 Feb 2020 06:07 )  Alb: 2.7 [3.5 - 5.2] / Pro: 4.8 [6.0 - 8.0] / ALK PHOS: 235 [30 - 115] / ALT: 69 [0 - 41] / AST: 33 [0 - 41] / GGT: x     LIVER FUNCTIONS - ( 17 Feb 2020 04:55 )  Alb: 2.5 [3.5 - 5.2] / Pro: 4.3 [6.0 - 8.0] / ALK PHOS: 266 [30 - 115] / ALT: 80 [0 - 41] / AST: 49 [0 - 41] / GGT: x     LIVER FUNCTIONS - ( 16 Feb 2020 04:00 )  Alb: 2.2 [3.5 - 5.2] / Pro: 4.0 [6.0 - 8.0] / ALK PHOS: 321 [30 - 115] / ALT: 108 [0 - 41] / AST: 141 [0 - 41] / GGT: x       02-20    140  |  103  |  69<HH>  ----------------------------<  211<H>  4.7   |  23  |  1.1    Ca    7.4<L>      20 Feb 2020 06:40  Phos  3.5     02-20  Mg     1.4     02-20          02-01 Chol 203<H>  HDL 74 Trig 128    Prior EGD: none in records   Prior Colonoscopy: one year ago with dr Nicole showing hemorrhoids according to        RADIOLOGY:    CT Abdomen and Pelvis:    < from: CT Abdomen and Pelvis No Cont (02.19.18 @ 11:00) >    LOWER CHEST: New 0.9 x 0.9 x 0.6 cm solid pulmonary nodule, right lower   lobe (series 4/49, 602/35). Stable bilateral pleural plaque, thickening   and calcifications, left greater than left. Redemonstrated bibasal   fibrotic changes, right greater than left. Small area of round   atelectasis abutting the posterior left lower lobe left pleura.     HEPATOBILIARY: Unremarkable.    SPLEEN: Stable punctate calcification of the upper pole likely sequela of   prior granulomatous reaction. Otherwise, unremarkable.    PANCREAS: Unremarkable.    ADRENAL GLANDS: Unremarkable.    KIDNEYS: No hydronephrosis or obstructing calculus.    ABDOMINOPELVIC NODES: Unremarkable.    PELVIC ORGANS: Stable calcified fibroid.     PERITONEUM/MESENTERY/BOWEL: Colonic diverticulosis. No bowel obstruction,   pneumoperitoneum, pneumatosis or ascites. Normal appendix.    BONES/SOFT TISSUES: Diffuse osteopenia. Stable chronic compression   deformity of L1 post kyphoplasty.     OTHER: Atherosclerotic calcification of the aorta and its branches. No   acute osseous abnormality.       < end of copied text >      USG Abdomen    < from: US Abdomen Limited (02.02.20 @ 12:01) >    LIVER:  Normal in contour and echogenicity measuring 16.9 cm in length. No focal mass.    GALLBLADDER/BILIARY TREE:  No evidence of cholelithiasis. No wall thickening or pericholecystic fluid. Negative sonographic Maguire's sign. Nonmobile 0.5 cm echogenic focus, likely polyp. No intrahepatic biliary ductal dilatation. The common bile duct measures 5 mm, which is normal.     PANCREAS:  Visualized head and body are unremarkable. Remainder obscured by overlying bowel gas.    KIDNEY: Please see separately dictated report renal sonogram for kidney findings.    AORTA/IVC:  Visualized proximal portions unremarkable.    ASCITES:  None.    < end of copied text > Hospital Day # 21d    HPI:   73y/o F w/ hx of asthma, COPD not on home O2, autoimmune hepatitis on prednisone and tacrolimus, heterozygous prothrombin gene mutation with hx of PE and DVT on coumadin with recent hospitalization in January 2020 with a diagnosis of pneumonia presents for worsening SOB, hemoptysis and cough. Everything started yesterday night when patient was in bed, started to feel shortness of breath and had many episodes of hemoptysis with clots, she also had substernal chest pain non radiating, moderate in intensity that worsens on coughing. She denies fever but endorses chills. She also admits for lightheadedness that occurred yesterday, no HA, abd pain, dysuria or paresthesias. She had 2 loose bowel movements since yesterday with some blood in the stools that she attributes to her hemorrhoids. She stopped Coumadin couple of days ago since her INR was supratherapeutic. She is from [facility] short term and also mentions that her  and another family member have the flu and she was exposed to them. She did not have the flu shot this season.  In ED, temp was 100.1 rectally, tachycardic ( sinus) , she was saturating to 70s on non rebreather and her SaO2 went up to 95%. ABG showing respiratory alkalosis initially and then corrected after BIPAP placement and correction of RR.  CTA chest showing no PE but showing interval development of multifocal bilateral groundglass opacities as well as new moderate to severe bronchiectasis in the right lung. Findings are concerning for an acute infectious/inflammatory process. (31 Jan 2020 14:36)    GI HPI:  73y/o F w/ hx of asthma, COPD not on home O2, autoimmune hepatitis on prednisone and tacrolimus, heterozygous prothrombin gene mutation with hx of PE and DVT on coumadin admitted with SOB found to have ROBBY , acute hypoxic respiratory failure secondary to influenza A and HCAP  , candidemia , and REJI. Patient had digni shield. patient was seen by GI team 5 days ago for same complain of fresh blood per rectum and clots. patient was hemodynamically stable, and intervention was deferred because of clinical condition and the fact that there was no need for urgent intervention. GI service is reconsulted again for fresh blood per rectum and and passaging of the clots this AM. patient is extubated 5 days ago, digni shield removed. Patient denies abdominal pain, nausea, vomiting, fever , chills. remains on high flow nasal cannula with NG tube in place for tube feeds.       PAST MEDICAL & SURGICAL HISTORY  Prothrombin gene mutation  Autoimmune hepatitis  Other chronic pulmonary embolism without acute cor pulmonale  Essential hypertension  Autoimmune hepatitis treated with steroids  Asthma  DVT (deep venous thrombosis)  S/P debridement  History of back surgery      FAMILY HISTORY:  No pertinent family history in first degree relatives    SOCIAL HISTORY:  Tobacco:  former smoker   Alcohol: denies   Drugs: denies     ALLERGIES:  No Known Allergies      MEDICATIONS:  MEDICATIONS  (STANDING):  albuterol/ipratropium for Nebulization 3 milliLiter(s) Nebulizer every 6 hours  calcitriol  Solution 0.25 MICROGram(s) Oral daily  calcium carbonate    500 mG (Tums) Chewable 3 Tablet(s) Chew every 8 hours  chlorhexidine 4% Liquid 1 Application(s) Topical <User Schedule>  cyanocobalamin 1000 MICROGram(s) Oral daily  fluconAZOLE   Tablet 400 milliGRAM(s) Oral daily  gabapentin 300 milliGRAM(s) Oral at bedtime  influenza   Vaccine 0.5 milliLiter(s) IntraMuscular once  insulin regular Infusion 4 Unit(s)/Hr (4 mL/Hr) IV Continuous <Continuous>  linezolid    Tablet 600 milliGRAM(s) Oral every 12 hours  methylPREDNISolone sodium succinate Injectable 60 milliGRAM(s) IV Push two times a day  montelukast 10 milliGRAM(s) Oral at bedtime  multivitamin/minerals 1 Tablet(s) Oral daily  mupirocin 2% Ointment 1 Application(s) Topical two times a day  pantoprazole   Suspension 40 milliGRAM(s) Oral daily    MEDICATIONS  (PRN):  acetaminophen   Tablet .. 650 milliGRAM(s) Oral every 6 hours PRN Temp greater or equal to 38C (100.4F)  oxyCODONE    IR 10 milliGRAM(s) Oral every 8 hours PRN Severe Pain (7 - 10)  oxyCODONE    IR 5 milliGRAM(s) Oral every 6 hours PRN breakthrough pain      HOME MEDICATIONS:  Home Medications:  acetaminophen 500 mg oral tablet: 2 tab(s) orally every 8 hours (20 Jan 2020 13:38)  amLODIPine 10 mg oral tablet: 1 tab(s) orally once a day (at bedtime) (20 Jan 2020 13:38)  budesonide 3 mg oral capsule, extended release: 1 cap(s) orally 2 times a day (05 Dec 2019 17:19)  ferrous sulfate 325 mg (65 mg elemental iron) oral delayed release tablet: 1 tab(s) orally once a day (05 Dec 2019 17:19)  furosemide 20 mg oral tablet: 1 tab(s) orally once a day (05 Dec 2019 17:19)  gabapentin 300 mg oral capsule: 1 cap(s) orally once a day (at bedtime) (20 Jan 2020 13:38)  ibuprofen 400 mg oral tablet: 1 tab(s) orally every 6 hours, As needed, Moderate Pain (4 - 6) (20 Jan 2020 13:38)  ipratropium-albuterol 0.5 mg-2.5 mg/3 mLinhalation solution: 3 milliliter(s) inhaled every 6 hours, As Needed (20 Jan 2020 13:40)  lidocaine 5% topical film: Apply topically to affected area once a day (20 Jan 2020 13:38)  Metoprolol Tartrate 50 mg oral tablet: 1 tab(s) orally once a day (05 Dec 2019 17:19)  montelukast 10 mg oral tablet: 1 tab(s) orally once a day (at bedtime) (05 Dec 2019 17:19)  Multiple Vitamins with Minerals oral tablet: 1 tab(s) orally once a day (20 Jan 2020 13:38)  oxyCODONE 10 mg oral tablet: 1 tab(s) orally every 6 hours, As needed, Severe Pain (7 - 10) (20 Jan 2020 13:38)  predniSONE 20 mg oral tablet: 3 tab(s) orally once a day (20 Jan 2020 13:38)  ProAir HFA 90 mcg/inh inhalation aerosol: 1 puff(s) inhaled every 4 hours, As Needed (20 Jan 2020 13:40)  risedronate 150 mg oral tablet: 1 tab(s) orally once a month (05 Dec 2019 17:19)  tacrolimus 0.5 mg oral capsule: 1 cap(s) orally every 12 hours (05 Dec 2019 17:19)  tiotropium 18 mcg inhalation capsule: 1 cap(s) inhaled once a day, As Needed (20 Jan 2020 13:40)  Vitamin B12 1000 mcg oral tablet: 1 tab(s) orally once a day (05 Dec 2019 17:19)  Vitamin C 1000 mg oral tablet: 1 tab(s) orally once a day (05 Dec 2019 17:19)  warfarin 4 mg oral tablet: 1 tab(s) orally once a day (at bedtime) (20 Jan 2020 13:38)      REVIEW OF SYSTEMS:    CONSTITUTIONAL: No fever  EYES/ENT: No scleral icterus  RESPIRATORY: No shortness of breath  CARDIOVASCULAR: No chest pain   GASTROINTESTINAL: as GI HPI  GENITOURINARY: No dysuria  NEUROLOGICAL: No dizziness  SKIN: No cyanosis      VITALS:   T(F): 94 (02-21 @ 08:00), Max: 98.6 (02-21 @ 04:00)  HR: 94 (02-21 @ 09:00) (66 - 124)  BP: 152/69 (02-21 @ 09:00) (105/51 - 216/105)  BP(mean): 84 (02-21 @ 09:00) (69 - 162)  RR: 19 (02-21 @ 09:00) (10 - 45)  SpO2: 90% (02-21 @ 09:00) (81% - 100%)    I&O's Summary    20 Feb 2020 07:01  -  21 Feb 2020 07:00  --------------------------------------------------------  IN: 2700 mL / OUT: 1300 mL / NET: 1400 mL    21 Feb 2020 07:01  -  21 Feb 2020 09:28  --------------------------------------------------------  IN: 6 mL / OUT: 0 mL / NET: 6 mL        Physical Exam:  GENERAL: NAD  HEAD:  Atraumatic  EYES:  conjunctiva and sclera clear  NECK: No thyromegaly  CHEST/LUNG: No accessory use of muscle  HEART: S1S2 Normal  ABDOMEN: Soft, Nontender, Nondistended; Bowel sounds present, no guarding or rigidity.  EXTREMITIES:  No cyanosis  NEURO: AAOx3  [place] with fresh blood and blood clots         LABS:                        8.8    16.54 )-----------( 108      ( 20 Feb 2020 06:40 )             26.4         LIVER FUNCTIONS - ( 20 Feb 2020 06:40 )  Alb: 2.5 g/dL / Pro: 4.6 g/dL / ALK PHOS: 202 U/L / ALT: 45 U/L / AST: 26 U/L / GGT: x           LIVER FUNCTIONS - ( 20 Feb 2020 06:40 )  Alb: 2.5 [3.5 - 5.2] / Pro: 4.6 [6.0 - 8.0] / ALK PHOS: 202 [30 - 115] / ALT: 45 [0 - 41] / AST: 26 [0 - 41] / GGT: x     LIVER FUNCTIONS - ( 19 Feb 2020 06:15 )  Alb: 2.7 [3.5 - 5.2] / Pro: 4.8 [6.0 - 8.0] / ALK PHOS: 229 [30 - 115] / ALT: 56 [0 - 41] / AST: 29 [0 - 41] / GGT: x     LIVER FUNCTIONS - ( 18 Feb 2020 06:07 )  Alb: 2.7 [3.5 - 5.2] / Pro: 4.8 [6.0 - 8.0] / ALK PHOS: 235 [30 - 115] / ALT: 69 [0 - 41] / AST: 33 [0 - 41] / GGT: x     LIVER FUNCTIONS - ( 17 Feb 2020 04:55 )  Alb: 2.5 [3.5 - 5.2] / Pro: 4.3 [6.0 - 8.0] / ALK PHOS: 266 [30 - 115] / ALT: 80 [0 - 41] / AST: 49 [0 - 41] / GGT: x     LIVER FUNCTIONS - ( 16 Feb 2020 04:00 )  Alb: 2.2 [3.5 - 5.2] / Pro: 4.0 [6.0 - 8.0] / ALK PHOS: 321 [30 - 115] / ALT: 108 [0 - 41] / AST: 141 [0 - 41] / GGT: x       02-20    140  |  103  |  69<HH>  ----------------------------<  211<H>  4.7   |  23  |  1.1    Ca    7.4<L>      20 Feb 2020 06:40  Phos  3.5     02-20  Mg     1.4     02-20 02-01 Chol 203<H>  HDL 74 Trig 128    Prior EGD: none in records   Prior Colonoscopy: one year ago with dr Nicole showing hemorrhoids according to        RADIOLOGY:    CT Abdomen and Pelvis:    < from: CT Abdomen and Pelvis No Cont (02.19.18 @ 11:00) >    LOWER CHEST: New 0.9 x 0.9 x 0.6 cm solid pulmonary nodule, right lower   lobe (series 4/49, 602/35). Stable bilateral pleural plaque, thickening   and calcifications, left greater than left. Redemonstrated bibasal   fibrotic changes, right greater than left. Small area of round   atelectasis abutting the posterior left lower lobe left pleura.     HEPATOBILIARY: Unremarkable.    SPLEEN: Stable punctate calcification of the upper pole likely sequela of   prior granulomatous reaction. Otherwise, unremarkable.    PANCREAS: Unremarkable.    ADRENAL GLANDS: Unremarkable.    KIDNEYS: No hydronephrosis or obstructing calculus.    ABDOMINOPELVIC NODES: Unremarkable.    PELVIC ORGANS: Stable calcified fibroid.     PERITONEUM/MESENTERY/BOWEL: Colonic diverticulosis. No bowel obstruction,   pneumoperitoneum, pneumatosis or ascites. Normal appendix.    BONES/SOFT TISSUES: Diffuse osteopenia. Stable chronic compression   deformity of L1 post kyphoplasty.     OTHER: Atherosclerotic calcification of the aorta and its branches. No   acute osseous abnormality.       < end of copied text >      USG Abdomen    < from: US Abdomen Limited (02.02.20 @ 12:01) >    LIVER:  Normal in contour and echogenicity measuring 16.9 cm in length. No focal mass.    GALLBLADDER/BILIARY TREE:  No evidence of cholelithiasis. No wall thickening or pericholecystic fluid. Negative sonographic Maguire's sign. Nonmobile 0.5 cm echogenic focus, likely polyp. No intrahepatic biliary ductal dilatation. The common bile duct measures 5 mm, which is normal.     PANCREAS:  Visualized head and body are unremarkable. Remainder obscured by overlying bowel gas.    KIDNEY: Please see separately dictated report renal sonogram for kidney findings.    AORTA/IVC:  Visualized proximal portions unremarkable.    ASCITES:  None.    < end of copied text >

## 2020-02-21 NOTE — PROVIDER CONTACT NOTE (OTHER) - SITUATION
Pt had large PRBPR w/ clots,  notified,  bedside to assess, STAT labs obtained, pt hypothermic temp 94 axillary, BP stable at this time.

## 2020-02-21 NOTE — PROGRESS NOTE ADULT - SUBJECTIVE AND OBJECTIVE BOX
Patient is a 75y old  Female who presents with a chief complaint of SOB and desaturation (20 Feb 2020 20:33)      Over Night Events:  Patient seen and examined.  TLC out   s/p midline   still weak   ROS:  See HPI    PHYSICAL EXAM    ICU Vital Signs Last 24 Hrs  T(C): 37 (21 Feb 2020 04:00), Max: 37 (21 Feb 2020 04:00)  T(F): 98.6 (21 Feb 2020 04:00), Max: 98.6 (21 Feb 2020 04:00)  HR: 100 (21 Feb 2020 07:00) (70 - 108)  BP: 160/67 (21 Feb 2020 07:00) (113/65 - 196/79)  BP(mean): 106 (21 Feb 2020 07:00) (69 - 143)  ABP: --  ABP(mean): --  RR: 42 (21 Feb 2020 07:00) (13 - 43)  SpO2: 92% (21 Feb 2020 07:00) (81% - 100%)      General:  AOx3  HEENT:  jose              Lymph Nodes: NO cervical LN   Lungs: Bilateral rhonchi   Cardiovascular: Regular   Abdomen: Soft, Positive BS  Extremities: No clubbing 2 edema   Skin: warm   Neurological: no focal deficit   Musculoskeletal: move all ext     I&O's Detail    20 Feb 2020 07:01  -  21 Feb 2020 07:00  --------------------------------------------------------  IN:    Enteral Tube Flush: 150 mL    Free Water: 1500 mL    insulin regular Infusion: 110 mL    IV PiggyBack: 100 mL    Nepro with Carb Steady: 840 mL  Total IN: 2700 mL    OUT:    Voided: 1300 mL  Total OUT: 1300 mL    Total NET: 1400 mL          LABS:                          8.8    16.54 )-----------( 108      ( 20 Feb 2020 06:40 )             26.4         20 Feb 2020 06:40    140    |  103    |  69     ----------------------------<  211    4.7     |  23     |  1.1      Ca    7.4        20 Feb 2020 06:40  Phos  3.5       20 Feb 2020 06:40  Mg     1.4       20 Feb 2020 06:40                                                                                                                                                                                                                                       MEDICATIONS  (STANDING):  albuterol/ipratropium for Nebulization 3 milliLiter(s) Nebulizer every 6 hours  calcitriol  Solution 0.25 MICROGram(s) Oral daily  calcium carbonate    500 mG (Tums) Chewable 3 Tablet(s) Chew every 8 hours  chlorhexidine 4% Liquid 1 Application(s) Topical <User Schedule>  cyanocobalamin 1000 MICROGram(s) Oral daily  fluconAZOLE   Tablet 400 milliGRAM(s) Oral daily  gabapentin 300 milliGRAM(s) Oral at bedtime  influenza   Vaccine 0.5 milliLiter(s) IntraMuscular once  insulin regular Infusion 4 Unit(s)/Hr (4 mL/Hr) IV Continuous <Continuous>  linezolid    Tablet 600 milliGRAM(s) Oral every 12 hours  methylPREDNISolone sodium succinate Injectable 60 milliGRAM(s) IV Push every 8 hours  montelukast 10 milliGRAM(s) Oral at bedtime  multivitamin/minerals 1 Tablet(s) Oral daily  mupirocin 2% Ointment 1 Application(s) Topical two times a day  pantoprazole   Suspension 40 milliGRAM(s) Oral daily    MEDICATIONS  (PRN):  acetaminophen   Tablet .. 650 milliGRAM(s) Oral every 6 hours PRN Temp greater or equal to 38C (100.4F)  oxyCODONE    IR 10 milliGRAM(s) Oral every 8 hours PRN Severe Pain (7 - 10)  oxyCODONE    IR 5 milliGRAM(s) Oral every 6 hours PRN breakthrough pain          Xrays:  TLC:  OG:  ET tube:                                                                                    b/l opacity    ECHO:  [facility] ICU:

## 2020-02-21 NOTE — PROGRESS NOTE ADULT - ASSESSMENT
75 y/o female with pmhx of asthma, HTN,  autoimmune hepatitis, heterozygous prothrombin gene mutation with hx of PE and DVT on coumadin admitted for SOB     Acute hypoxic resp failure with hypoxia due to influenza, HCAP  Septic shock in immunocompromised patient, Severe sepsis present on admission   - weaned from vent => on Hi flow NC  - remains on IV Solu-medrol  - antibiotics and antiviral completed for initial infection  - now with MRSA VAP  on Zyvox for 10 days total  - continue Duoneb q6h and q4h PRN  - ID and pulm f/u appreciated    Acute Blood loss - recurrent episode today  - had rectal tube earlier on this adm which was discontinued due to ulcer?  - has a hx of hemorrhoids as per patient and her spouse  - last colonoscopy about 1 year ago with Dr Washington  - started on IV Protonix  - may need to transfuse, has had multiple PRBC's this adm  - GI f/u      Candedemia  - central line change noted  - cultures negative x2  - 2D echo no evidence of vegatation  - ophth evaluation noted, no clinical evidence, low suspicion for ocular fungemia/endogenous endophthalmitis   - on Caspofungin  as per ID recommendations thru 2/27    REJI on CKD 3  - creatinine trending down  - renal f/u noted  - urine output noted  - continue to monitor  - follow renal recommendations    Autoimmune hepatitis  - On prednisone 60 mg PO qd and Tacrolimus at home  - now on Solu-medrol  - Tacrolimus held    HTN  - now off Labetolol    Heterozygous prothrombin gene mutation with hx of PE and DVT on coumadin:  - Coumadin had been held for alveolar hemorrhage     Hx of asthma  - on Montelukast qd    Multiple Skin tears/ wounds  - local care  - elevate extremities    Altered MS, weakness  - neuro followup noted  - EEG negative  - MS continues to improve      Diet: Enteral feedings to be held for GI evaluation  GI PPx: Protonix  DVT PPx: sequentials  Activity: bedrest  Dispo: readmitted to hospital from  rehab at [facility], remains ICU      Continue supportive measures, patient remains acutely ill, with improving mental status. Overall prognosis poor.

## 2020-02-21 NOTE — PROGRESS NOTE ADULT - ASSESSMENT
IMPRESSION:    Acute hypoxic respiratory failure   DAH resolved   Influenza A treated   ARDS  Candidemia   REJI improving   HO Autoimmune hepatitis on tacrolimus and chronic steroids   h/o hypercoagulable state/ vte was on coumadin   Left great saphenous vein thrombosis chronic    PLAN:    CNS:  Keep off sedation.  FU MS.      HEENT: ET care.  Oral care.      PULMONARY:  HOB @ 45 degrees. lower Solumedrol 60 mg q12 hrs for now. Wean O2. Aspiration precautions.      NIV  at night and as needed   CARDIOVASCULAR:  I=O.       NG feed   free water 250 cc Q 4 hrs   daily EKG   GI: GI prophylaxis.  NG feeding    RENAL:  Follow up lytes. Replete as needed.  FU with Renal.  FU lytes 6 pm  BMP afternoon     INFECTIOUS DISEASE: Follow up cultures.  Continue Anti fungal. Zyvox     HEMATOLOGICAL:  DVT prophylaxis.  fu cbc     ENDOCRINE:  Follow up FS.  Insulin protocol if needed.    MUSCULOSKELETAL: Bed rest     bed in chair Mode     Code status: full     Prognosis: Poor prognosis     Continue MICU monitoring for now.

## 2020-02-22 NOTE — SWALLOW BEDSIDE ASSESSMENT ADULT - SLP GENERAL OBSERVATIONS
Pt. received awake, alert, on Hi-flow at 40 liters per minute. Pt. received awake, alert, on Hi-flow inner cannula at 40 liters per minute.

## 2020-02-22 NOTE — PROGRESS NOTE ADULT - ASSESSMENT
74y female with PMH of asthma, COPD not on home O2, autoimmune hepatitis on prednisone and tacrolimus, heterozygous prothrombin gene mutation with hx of PE and DVT on coumadin presents to the hospital complaining of cough, hemoptysis, SOB and desaturation to 70s. Pt was found to be flu+ likely viral PNA complicated by diffuse alveolar hemorrhage and ARDS . Hospital course complicated by DVT in L saphenous vein with possible DVT and REJI likely secondary to ATN due to vanco.      #ARDS  1-alveolar hemorrhage   2-flu +ve with post viral pneumonia resolved   3-possible PE: cant start AC due to alveolar hemorrhage    - s/p DDAVP 4 doses   - Solumedrol  - Lasix 40   - s/p cefepime and Levaquin (competed 7 days yesterday) - off Vanc >> kidneys toxicity  - completed oseltamivir 30 mg   - C/w Duoneb   - Bronch results - neg culture, neg fungal, cytology positive for inflammatory cells, no organisms  - Repeat Bronch 2/16 showed bleeding from DAH    #Candidemia   - likely A line is source , found to be foul-smelling and surrounded by pus during removal  - blood cultures grew candida  - cultures neg since 2/14  - sputum cx grew MRSA   - oral fluconazole 14 days total   - Zyvox 7 days total   - ID following  - trend CBC and EKG daily (QTc)     #Rectal Bleeding   - GI following   - Protonix BID  - FlexSig showed 2 rectal ulcers one clean one crater, s/p clipping  - keep HGB above 8  - active type and screen   - H/H stable    #REJI oliguric likely ATN with Vanc toxicity   - Resolved    # 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  - duplex shows DVT consistent with prior  - might need IVC filter  - not candidate for AC at this time due to alveolar hemorrhage     #Immunosuppressed: Autoimmune Hepatitis   - continue with steroid and tacrolimus  - prednisone 60 mg PO qd at home now on solumedrol   - CMV PCR neg this admission     # HTN  - holding amlodipine 10 and lopressor 50   - Monitor BPs    # heterozygous prothrombin gene mutation with hx of PE and DVT on coumadin  - holding coumadin for now due to alveolar hemorrhage   - monitor coags    #0.5 cm of GB polyp  -needs f/u US in 12 months     # Hx of asthma  - C/w montelukast qd    #Deconditioning   - pt severely deconditioned due to laying in bed and chronic steroid use   - PT/OT/Rehab once downgraded     # GI PPx: Protonix 40 mg PO qd  # DVT PPx: sequentials to right leg only  # Activity: bedrest   # Dispo: ICU  # Code Status: FULL 74y female with PMH of asthma, COPD not on home O2, autoimmune hepatitis on prednisone and tacrolimus, heterozygous prothrombin gene mutation with hx of PE and DVT on coumadin presents to the hospital complaining of cough, hemoptysis, SOB and desaturation to 70s. Pt was found to be flu+ likely viral PNA complicated by diffuse alveolar hemorrhage and ARDS . Hospital course complicated by DVT in L saphenous vein with possible DVT and REJI likely secondary to ATN due to vanco.      #ARDS  1-alveolar hemorrhage   2-flu +ve with post viral pneumonia resolved   3-possible PE: cant start AC due to alveolar hemorrhage    - s/p DDAVP 4 doses   - Solumedrol  - Lasix 40   - s/p cefepime and Levaquin (competed 7 days yesterday) - off Vanc >> kidneys toxicity  - completed oseltamivir 30 mg   - C/w Duoneb   - Bronch results - neg culture, neg fungal, cytology positive for inflammatory cells, no organisms  - Repeat Bronch 2/16 showed bleeding from DAH    #Candidemia   - likely A line is source , found to be foul-smelling and surrounded by pus during removal  - blood cultures grew candida  - cultures neg since 2/14  - sputum cx grew MRSA   - oral fluconazole 14 days total   - Zyvox 7 days total   - ID following  - trend CBC and EKG daily (QTc)     #Rectal Bleeding   - GI following   - Protonix switch to PO   - FlexSig showed 2 rectal ulcers one clean one crater, s/p clipping  - keep HGB above 8  - active type and screen   - H/H stable  - cbc q24   - Avoid Dignshield or rectal tube     #REJI oliguric likely ATN with Vanc toxicity   - Resolved    # 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  - duplex shows DVT consistent with prior  - might need IVC filter  - not candidate for AC at this time due to alveolar hemorrhage     #Immunosuppressed: Autoimmune Hepatitis   - continue with steroid and tacrolimus  - prednisone 60 mg PO qd at home now on solumedrol   - CMV PCR neg this admission   - f/u repeat tarcolimus lvl    # HTN  - holding amlodipine 10 and lopressor 50   - Monitor BPs    # heterozygous prothrombin gene mutation with hx of PE and DVT on coumadin  - holding coumadin for now due to alveolar hemorrhage   - monitor coags    #0.5 cm of GB polyp  -needs f/u US in 12 months     # Hx of asthma  - C/w montelukast qd    #Deconditioning   - pt severely deconditioned due to laying in bed and chronic steroid use   - PT/OT/Rehab once downgraded     # GI PPx: Protonix 40 mg PO qd  # DVT PPx: sequentials to right leg only  # Activity: bedrest   # Dispo: ICU  # Code Status: FULL

## 2020-02-22 NOTE — PROGRESS NOTE ADULT - SUBJECTIVE AND OBJECTIVE BOX
Patient is a 75y old  Female who presents with a chief complaint of SOB and desaturation (22 Feb 2020 09:03)        Over Night Events: On Hi-flow. Feeling well.       ROS:     CONSTITUTIONAL:   no fever   no chills.  no weight gain   no weight loss    EYES:   no discharge,   no pain  no redness,   no visual changes.    ENT:   Ears: no ear pain and no hearing problems.  Nose: no nasal congestion and no nasal drainage.  Mouth/Throat: no dysphagia,  no hoarseness and no throat pain.  Neck: no lumps, no pain, no stiffness and no swollen glands.    CARDIOVASCULAR:   no chest pain,   no swelling  no palpitaions  no syncope    RESPIRATORY:  no SOB,  no wheezing ,  no respiratory difficulty  no sputum production    GASTROINTESTINAL:   no abdominal pain,   no constipation,   no diarrhea,   no vomiting.    GENITOURINARY:  no dysuria,   no frequency,   no urgency  no hematuria.    MUSCULOSKELETAL:   no back pain,   no musculoskeletal pain,  no weakness.    SKIN:   no jaundice,   no lesions,   no pruritis,   Diffuse skin break down    NEURO:   no loss of consciousness,   no gait abnormality,   no headache,   no sensory deficits,   no weakness.    PSYCHIATRIC:   no known mental health issues  no anxiety  no depression    ALLERGIC/IMMUNOLOGIC:   No active allergic or immunologic issues    PHYSICAL EXAM    ICU Vital Signs Last 24 Hrs  T(C): 36.8 (22 Feb 2020 08:00), Max: 37.1 (21 Feb 2020 20:00)  T(F): 98.2 (22 Feb 2020 08:00), Max: 98.7 (21 Feb 2020 20:00)  HR: 94 (22 Feb 2020 09:00) (70 - 122)  BP: 126/66 (22 Feb 2020 09:00) (111/58 - 170/91)  BP(mean): 97 (22 Feb 2020 09:00) (80 - 141)  RR: 15 (22 Feb 2020 09:00) (12 - 37)  SpO2: 100% on 40% FiO2 / 40L flow(22 Feb 2020 09:00) (93% - 100%)      CONSTITUTIONAL:  Ill appearing. NAD    ENT:   Airway patent  Mouth with normal mucosa.  No thrush    EYES:   Pupils equal  Round and reactive to light    CARDIAC:   Normal rate  Regular rhythm  No edema    Vascular:  Normal systolic impulse  No Carotid bruits    RESPIRATORY:   Bilateral BS heard  Bilateral crackles  Normal chest expansion  Mild tachypneic  No use of accessory muscles    GASTROINTESTINAL:  Abdomen soft,   Non-tender,   No guarding,   + BS    GENITOURINARY  normal genitalia for sex  no edema    MUSCULOSKELETAL:   Range of motion is not limited,  No muscle or joint tenderness  No clubbing, cyanosis    NEUROLOGICAL:   Alert and oriented   No motor  deficits.  pertinent DTRs normal    SKIN:   Diffuse skin breakdown    PSYCHIATRIC:   Normal mood and affect.   No apparent risk to self or others.    HEME LYMPH:   No splenomegaly.  No cervical  lymphadenopathy.  no inguinal lymphadenopathy      02-21-20 @ 07:01  -  02-22-20 @ 07:00  --------------------------------------------------------  IN:    Enteral Tube Flush: 150 mL    Free Water: 250 mL    insulin regular Infusion: 110 mL    Nepro with Carb Steady: 450 mL  Total IN: 960 mL    OUT:    Voided: 1850 mL  Total OUT: 1850 mL    Total NET: -890 mL      02-22-20 @ 07:01  -  02-22-20 @ 10:16  --------------------------------------------------------  IN:    Free Water: 250 mL    insulin regular Infusion: 20 mL  Total IN: 270 mL    OUT:  Total OUT: 0 mL    Total NET: 270 mL    LABS:                        8.6    18.60 )-----------( 119      ( 21 Feb 2020 16:06 )             25.3                                               02-21    137  |  98  |  70<HH>  ----------------------------<  302<H>  3.7   |  23  |  1.0    Ca    7.3<L>      21 Feb 2020 09:00    TPro  4.5<L>  /  Alb  2.6<L>  /  TBili  1.1  /  DBili  x   /  AST  30  /  ALT  41  /  AlkPhos  213<H>  02-21                                                LIVER FUNCTIONS - ( 21 Feb 2020 09:00 )  Alb: 2.6 g/dL / Pro: 4.5 g/dL / ALK PHOS: 213 U/L / ALT: 41 U/L / AST: 30 U/L / GGT: x                                              MEDICATIONS  (STANDING):  albuterol/ipratropium for Nebulization 3 milliLiter(s) Nebulizer every 6 hours  calcitriol  Solution 0.25 MICROGram(s) Oral daily  calcium carbonate    500 mG (Tums) Chewable 3 Tablet(s) Chew every 8 hours  chlorhexidine 4% Liquid 1 Application(s) Topical <User Schedule>  cyanocobalamin 1000 MICROGram(s) Oral daily  fluconAZOLE   Tablet 400 milliGRAM(s) Oral daily  gabapentin 300 milliGRAM(s) Oral at bedtime  influenza   Vaccine 0.5 milliLiter(s) IntraMuscular once  insulin regular Infusion 4 Unit(s)/Hr (4 mL/Hr) IV Continuous <Continuous>  linezolid    Tablet 600 milliGRAM(s) Oral every 12 hours  methylPREDNISolone sodium succinate Injectable 60 milliGRAM(s) IV Push two times a day  montelukast 10 milliGRAM(s) Oral at bedtime  multivitamin/minerals 1 Tablet(s) Oral daily  mupirocin 2% Ointment 1 Application(s) Topical two times a day  pantoprazole  Injectable 40 milliGRAM(s) IV Push two times a day    MEDICATIONS  (PRN):  acetaminophen   Tablet .. 650 milliGRAM(s) Oral every 6 hours PRN Temp greater or equal to 38C (100.4F)  oxyCODONE    IR 10 milliGRAM(s) Oral every 8 hours PRN Severe Pain (7 - 10)  oxyCODONE    IR 5 milliGRAM(s) Oral every 6 hours PRN breakthrough pain Patient is a 75y old  Female who presents with a chief complaint of SOB and desaturation (22 Feb 2020 09:03)        Over Night Events: On Hi-flow. Feeling well.       ROS:   Unable to obtain due to patient's condition.      PHYSICAL EXAM    ICU Vital Signs Last 24 Hrs  T(C): 36.8 (22 Feb 2020 08:00), Max: 37.1 (21 Feb 2020 20:00)  T(F): 98.2 (22 Feb 2020 08:00), Max: 98.7 (21 Feb 2020 20:00)  HR: 94 (22 Feb 2020 09:00) (70 - 122)  BP: 126/66 (22 Feb 2020 09:00) (111/58 - 170/91)  BP(mean): 97 (22 Feb 2020 09:00) (80 - 141)  RR: 15 (22 Feb 2020 09:00) (12 - 37)  SpO2: 100% on 40% FiO2 / 40L flow(22 Feb 2020 09:00) (93% - 100%)      CONSTITUTIONAL:  Ill appearing. NAD    ENT:   Airway patent  Mouth with normal mucosa.  No thrush    EYES:   Pupils equal  Round and reactive to light    CARDIAC:   Normal rate  Regular rhythm  No edema    Vascular:  Normal systolic impulse  No Carotid bruits    RESPIRATORY:   Bilateral BS heard  Bilateral crackles  Normal chest expansion  Mild tachypneic  No use of accessory muscles    GASTROINTESTINAL:  Abdomen soft,   Non-tender,   No guarding,   + BS      MUSCULOSKELETAL:   Range of motion is not limited,  No muscle or joint tenderness  No clubbing, cyanosis    NEUROLOGICAL:   Alert and oriented   No motor  deficits.  pertinent DTRs normal    SKIN:   Diffuse skin breakdown    PSYCHIATRIC:   Normal mood and affect.   No apparent risk to self or others.    HEME LYMPH:   No splenomegaly.  No cervical  lymphadenopathy.  no inguinal lymphadenopathy      02-21-20 @ 07:01  -  02-22-20 @ 07:00  --------------------------------------------------------  IN:    Enteral Tube Flush: 150 mL    Free Water: 250 mL    insulin regular Infusion: 110 mL    Nepro with Carb Steady: 450 mL  Total IN: 960 mL    OUT:    Voided: 1850 mL  Total OUT: 1850 mL    Total NET: -890 mL      02-22-20 @ 07:01  -  02-22-20 @ 10:16  --------------------------------------------------------  IN:    Free Water: 250 mL    insulin regular Infusion: 20 mL  Total IN: 270 mL    OUT:  Total OUT: 0 mL    Total NET: 270 mL    LABS:                        8.6    18.60 )-----------( 119      ( 21 Feb 2020 16:06 )             25.3                                               02-21    137  |  98  |  70<HH>  ----------------------------<  302<H>  3.7   |  23  |  1.0    Ca    7.3<L>      21 Feb 2020 09:00    TPro  4.5<L>  /  Alb  2.6<L>  /  TBili  1.1  /  DBili  x   /  AST  30  /  ALT  41  /  AlkPhos  213<H>  02-21                                                LIVER FUNCTIONS - ( 21 Feb 2020 09:00 )  Alb: 2.6 g/dL / Pro: 4.5 g/dL / ALK PHOS: 213 U/L / ALT: 41 U/L / AST: 30 U/L / GGT: x                                              MEDICATIONS  (STANDING):  albuterol/ipratropium for Nebulization 3 milliLiter(s) Nebulizer every 6 hours  calcitriol  Solution 0.25 MICROGram(s) Oral daily  calcium carbonate    500 mG (Tums) Chewable 3 Tablet(s) Chew every 8 hours  chlorhexidine 4% Liquid 1 Application(s) Topical <User Schedule>  cyanocobalamin 1000 MICROGram(s) Oral daily  fluconAZOLE   Tablet 400 milliGRAM(s) Oral daily  gabapentin 300 milliGRAM(s) Oral at bedtime  influenza   Vaccine 0.5 milliLiter(s) IntraMuscular once  insulin regular Infusion 4 Unit(s)/Hr (4 mL/Hr) IV Continuous <Continuous>  linezolid    Tablet 600 milliGRAM(s) Oral every 12 hours  methylPREDNISolone sodium succinate Injectable 60 milliGRAM(s) IV Push two times a day  montelukast 10 milliGRAM(s) Oral at bedtime  multivitamin/minerals 1 Tablet(s) Oral daily  mupirocin 2% Ointment 1 Application(s) Topical two times a day  pantoprazole  Injectable 40 milliGRAM(s) IV Push two times a day    MEDICATIONS  (PRN):  acetaminophen   Tablet .. 650 milliGRAM(s) Oral every 6 hours PRN Temp greater or equal to 38C (100.4F)  oxyCODONE    IR 10 milliGRAM(s) Oral every 8 hours PRN Severe Pain (7 - 10)  oxyCODONE    IR 5 milliGRAM(s) Oral every 6 hours PRN breakthrough pain

## 2020-02-22 NOTE — PROGRESS NOTE ADULT - ASSESSMENT
75y/o F w/ hx of asthma, COPD not on home O2, autoimmune hepatitis on prednisone and tacrolimus, heterozygous prothrombin gene mutation with hx of PE and DVT on coumadin admitted with SOB, patient has been treated for Flu + AH1, ?superimposed atypical PNA, Acute hypoxic respiratory failure  , Candidemia on fluconazole, REJI improving. GI are re consulted for small amount of fresh blood per rectum and blood clots     # Fresh blood per rectum with blood clots:   secondary to bleeding rectal ulcer s/p flex sig on 2/21 that showed rectal ulcer with visible vessel sp 4 clips applied   Bleeding resolved    REC:   have repeat HG today   Follow hg q24h   Protonix 40 po qd   Avoid Dignshield or rectal tube   If Hg stable can restart Anticoagulation if indicated   Continue tube feeding       # Autoimmune hepatitis:  - was on prednisone and tacrolimus as outpatient  -  with normal AST/ALT  - INR 4 days ago 1.27  - currently on solumedrol 60 q 12 hours, tacrolimus trough was high pending repeat, maintain therapeutic level   - follow up with Dr. Washington as outpatient    # 0.5 cm gallbladder polyp on US:   - repeat US abdomen in 6 months

## 2020-02-22 NOTE — PROGRESS NOTE ADULT - SUBJECTIVE AND OBJECTIVE BOX
We are following the patient for rectal bleed      GI HPI Today:  As per nurse pt had BM yesterday , no bloody and Brown in color   Hemodynamically stable     PAST MEDICAL & SURGICAL HISTORY  Prothrombin gene mutation  Autoimmune hepatitis  Other chronic pulmonary embolism without acute cor pulmonale  Essential hypertension  Autoimmune hepatitis treated with steroids  Asthma  DVT (deep venous thrombosis)  S/P debridement  History of back surgery      ALLERGIES:  No Known Allergies      MEDICATIONS:  MEDICATIONS  (STANDING):  albuterol/ipratropium for Nebulization 3 milliLiter(s) Nebulizer every 6 hours  calcitriol  Solution 0.25 MICROGram(s) Oral daily  calcium carbonate    500 mG (Tums) Chewable 3 Tablet(s) Chew every 8 hours  chlorhexidine 4% Liquid 1 Application(s) Topical <User Schedule>  cyanocobalamin 1000 MICROGram(s) Oral daily  fluconAZOLE   Tablet 400 milliGRAM(s) Oral daily  gabapentin 300 milliGRAM(s) Oral at bedtime  influenza   Vaccine 0.5 milliLiter(s) IntraMuscular once  insulin regular Infusion 4 Unit(s)/Hr (4 mL/Hr) IV Continuous <Continuous>  linezolid    Tablet 600 milliGRAM(s) Oral every 12 hours  methylPREDNISolone sodium succinate Injectable 60 milliGRAM(s) IV Push two times a day  montelukast 10 milliGRAM(s) Oral at bedtime  multivitamin/minerals 1 Tablet(s) Oral daily  mupirocin 2% Ointment 1 Application(s) Topical two times a day  pantoprazole  Injectable 40 milliGRAM(s) IV Push two times a day    MEDICATIONS  (PRN):  acetaminophen   Tablet .. 650 milliGRAM(s) Oral every 6 hours PRN Temp greater or equal to 38C (100.4F)  oxyCODONE    IR 10 milliGRAM(s) Oral every 8 hours PRN Severe Pain (7 - 10)  oxyCODONE    IR 5 milliGRAM(s) Oral every 6 hours PRN breakthrough pain      REVIEW OF SYSTEMS  unable to obtain        VITALS:   T(F): 98.7 (02-21 @ 20:00), Max: 98.7 (02-21 @ 20:00)  HR: 120 (02-22 @ 07:00) (66 - 124)  BP: 146/68 (02-22 @ 07:00) (111/58 - 216/105)  BP(mean): 101 (02-22 @ 07:00) (69 - 162)  RR: 25 (02-22 @ 07:00) (10 - 45)  SpO2: 98% (02-22 @ 07:00) (81% - 100%)        PHYSICAL EXAM:  GENERAL:  intubated   HEENT:  Conjunctivae Anicteric   CHEST:  vented   HEART:  NS1, S2,   ABDOMEN:  Soft, non distended,   SKIN:  No rash  NEURO:  intubated          Blood Work :                        8.6    18.60 )-----------( 119      ( 21 Feb 2020 16:06 )             25.3       02-21    137  |  98  |  70<HH>  ----------------------------<  302<H>  3.7   |  23  |  1.0    Ca    7.3<L>      21 Feb 2020 09:00  Phos  3.5     02-20  Mg     1.4     02-20      CBC -  ( 21 Feb 2020 16:06 )  Hemoglobin : 8.6    CBC -  ( 21 Feb 2020 09:00 )  Hemoglobin : 8.0    CBC -  ( 20 Feb 2020 06:40 )  Hemoglobin : 8.8    CBC -  ( 19 Feb 2020 06:15 )  Hemoglobin : 8.9      LIVER FUNCTIONS - ( 21 Feb 2020 09:00 )  Alb: 2.6 [3.5 - 5.2] / Pro: 4.5 [6.0 - 8.0] / ALK PHOS: 213 [30 - 115] / ALT: 41 [0 - 41] / AST: 30 [0 - 41] / GGT: x     LIVER FUNCTIONS - ( 20 Feb 2020 06:40 )  Alb: 2.5 [3.5 - 5.2] / Pro: 4.6 [6.0 - 8.0] / ALK PHOS: 202 [30 - 115] / ALT: 45 [0 - 41] / AST: 26 [0 - 41] / GGT: x     LIVER FUNCTIONS - ( 19 Feb 2020 06:15 )  Alb: 2.7 [3.5 - 5.2] / Pro: 4.8 [6.0 - 8.0] / ALK PHOS: 229 [30 - 115] / ALT: 56 [0 - 41] / AST: 29 [0 - 41] / GGT: x     LIVER FUNCTIONS - ( 18 Feb 2020 06:07 )  Alb: 2.7 [3.5 - 5.2] / Pro: 4.8 [6.0 - 8.0] / ALK PHOS: 235 [30 - 115] / ALT: 69 [0 - 41] / AST: 33 [0 - 41] / GGT: x     LIVER FUNCTIONS - ( 17 Feb 2020 04:55 )  Alb: 2.5 [3.5 - 5.2] / Pro: 4.3 [6.0 - 8.0] / ALK PHOS: 266 [30 - 115] / ALT: 80 [0 - 41] / AST: 49 [0 - 41] / GGT: x     LIVER FUNCTIONS - ( 16 Feb 2020 04:00 )  Alb: 2.2 [3.5 - 5.2] / Pro: 4.0 [6.0 - 8.0] / ALK PHOS: 321 [30 - 115] / ALT: 108 [0 - 41] / AST: 141 [0 - 41] / GGT: x

## 2020-02-22 NOTE — SWALLOW BEDSIDE ASSESSMENT ADULT - SLP PERTINENT HISTORY OF CURRENT PROBLEM
75y old female who presents with a chief complaint of SOB and desaturation to 70s. Pt was found to be flu+ likely viral PNA complicated by diffuse alveolar hemorrhage and ARDS. Currently on Hi flow 75y old female admitted from [facility] (there for short-term rehab) who presents with a chief complaint of worsening SOB, hemoptysis and cough.  PMHx: asthma, COPD not on home O2, autoimmune hepatitis; recent hospitalization in January 2020 with a diagnosis of pneumonia; pt being treated for acute hypoxic resp failure with hypoxia due to influenza and HCAP, Septic shock in immunocompromised patient; pt intubated 2/1 2' respiratory distress while on bipap. Currently on Hi flow nasal cannula.

## 2020-02-22 NOTE — PROGRESS NOTE ADULT - SUBJECTIVE AND OBJECTIVE BOX
Hospital Day:  22d    Subjective:    Pt was interviewed and examined at the bedside in the AM. No acute events overnight.   Pt more responsive today, progressively improving. s/p flex sig yesterday       Past Medical Hx:   Prothrombin gene mutation  Autoimmune hepatitis  Other chronic pulmonary embolism without acute cor pulmonale  Essential hypertension  Autoimmune hepatitis treated with steroids  Asthma  DVT (deep venous thrombosis)    Past Sx:  S/P debridement  History of back surgery  No significant past surgical history    Allergies:  No Known Allergies    Current Meds:   Standng Meds:  albuterol/ipratropium for Nebulization 3 milliLiter(s) Nebulizer every 6 hours  calcitriol  Solution 0.25 MICROGram(s) Oral daily  calcium carbonate    500 mG (Tums) Chewable 3 Tablet(s) Chew every 8 hours  chlorhexidine 4% Liquid 1 Application(s) Topical <User Schedule>  cyanocobalamin 1000 MICROGram(s) Oral daily  fluconAZOLE   Tablet 400 milliGRAM(s) Oral daily  gabapentin 300 milliGRAM(s) Oral at bedtime  influenza   Vaccine 0.5 milliLiter(s) IntraMuscular once  insulin regular Infusion 4 Unit(s)/Hr (4 mL/Hr) IV Continuous <Continuous>  linezolid    Tablet 600 milliGRAM(s) Oral every 12 hours  methylPREDNISolone sodium succinate Injectable 60 milliGRAM(s) IV Push two times a day  montelukast 10 milliGRAM(s) Oral at bedtime  multivitamin/minerals 1 Tablet(s) Oral daily  mupirocin 2% Ointment 1 Application(s) Topical two times a day  pantoprazole  Injectable 40 milliGRAM(s) IV Push two times a day    PRN Meds:  acetaminophen   Tablet .. 650 milliGRAM(s) Oral every 6 hours PRN Temp greater or equal to 38C (100.4F)  oxyCODONE    IR 10 milliGRAM(s) Oral every 8 hours PRN Severe Pain (7 - 10)  oxyCODONE    IR 5 milliGRAM(s) Oral every 6 hours PRN breakthrough pain    HOME MEDICATIONS:  acetaminophen 500 mg oral tablet: 2 tab(s) orally every 8 hours  amLODIPine 10 mg oral tablet: 1 tab(s) orally once a day (at bedtime)  budesonide 3 mg oral capsule, extended release: 1 cap(s) orally 2 times a day  ferrous sulfate 325 mg (65 mg elemental iron) oral delayed release tablet: 1 tab(s) orally once a day  furosemide 20 mg oral tablet: 1 tab(s) orally once a day  gabapentin 300 mg oral capsule: 1 cap(s) orally once a day (at bedtime)  ibuprofen 400 mg oral tablet: 1 tab(s) orally every 6 hours, As needed, Moderate Pain (4 - 6)  ipratropium-albuterol 0.5 mg-2.5 mg/3 mLinhalation solution: 3 milliliter(s) inhaled every 6 hours, As Needed  lidocaine 5% topical film: Apply topically to affected area once a day  Metoprolol Tartrate 50 mg oral tablet: 1 tab(s) orally once a day  montelukast 10 mg oral tablet: 1 tab(s) orally once a day (at bedtime)  Multiple Vitamins with Minerals oral tablet: 1 tab(s) orally once a day  oxyCODONE 10 mg oral tablet: 1 tab(s) orally every 6 hours, As needed, Severe Pain (7 - 10)  predniSONE 20 mg oral tablet: 3 tab(s) orally once a day  ProAir HFA 90 mcg/inh inhalation aerosol: 1 puff(s) inhaled every 4 hours, As Needed  risedronate 150 mg oral tablet: 1 tab(s) orally once a month  tacrolimus 0.5 mg oral capsule: 1 cap(s) orally every 12 hours  tiotropium 18 mcg inhalation capsule: 1 cap(s) inhaled once a day, As Needed  Vitamin B12 1000 mcg oral tablet: 1 tab(s) orally once a day  Vitamin C 1000 mg oral tablet: 1 tab(s) orally once a day  warfarin 4 mg oral tablet: 1 tab(s) orally once a day (at bedtime)      Vital Signs:   T(F): 98.2 (02-22-20 @ 08:00), Max: 98.7 (02-21-20 @ 20:00)  HR: 94 (02-22-20 @ 09:00) (70 - 122)  BP: 126/66 (02-22-20 @ 09:00) (118/67 - 170/91)  RR: 15 (02-22-20 @ 09:00) (12 - 37)  SpO2: 100% (02-22-20 @ 09:00) (93% - 100%)      02-21-20 @ 07:01  -  02-22-20 @ 07:00  --------------------------------------------------------  IN: 960 mL / OUT: 1850 mL / NET: -890 mL    02-22-20 @ 07:01  -  02-22-20 @ 13:42  --------------------------------------------------------  IN: 270 mL / OUT: 0 mL / NET: 270 mL        Physical Exam:   CONSTITUTIONAL: NAD  HEAD: Normocephalic; atraumatic, NG+  EYES: PERRL, EOMI, no conjunctival erythema  CARD: +S1, S2 Regular rate and rhythm  RESP: b/l xiomara  ABD: soft nontender, distended, no rebound, no guarding, no rigidity,   EXT: edema on limbs, wounds on b/l arms   NEURO: opens eyes, does follows commands, to weak to move limbs     Labs:                         7.6    10.03 )-----------( 88       ( 22 Feb 2020 10:14 )             23.4       22 Feb 2020 10:14    142    |  103    |  69     ----------------------------<  81     3.4     |  24     |  1.1      Ca    7.1        22 Feb 2020 10:14  Mg     1.8       22 Feb 2020 10:14    TPro  4.2    /  Alb  2.4    /  TBili  0.7    /  DBili  x      /  AST  30     /  ALT  39     /  AlkPhos  168    22 Feb 2020 10:14                          Culture - Blood (collected 02-17-20 @ 05:17)  Source: .Blood None  Preliminary Report (02-18-20 @ 14:02):    No growth to date.        Radiology:

## 2020-02-22 NOTE — PROGRESS NOTE ADULT - ASSESSMENT
IMPRESSION:    Acute hypoxic respiratory failure / ARDS - improving  DAH resolving  Influenza A treated   Candidemia   REJI improving   HO Autoimmune hepatitis on tacrolimus and chronic steroids   h/o hypercoagulable state/ VTE was on coumadin   Left chronic great saphenous vein thrombosis   Failed S/S     PLAN:    CNS:  Pain control    HEENT: ET care.  Oral care.      PULMONARY:  HOB @ 45 degrees. Continue Solumedrol 60 mg q12 hrs for now. Wean O2. Can try weaning to NC. Aspiration precautions.    NIV  at night and as needed     CARDIOVASCULAR:  I=O.    daily EKG     GI: GI prophylaxis.  NG feeding.  NG feed   free water 250 cc Q 4 hrs. Repeat S/S testing    RENAL:  Follow up lytes. Replete as needed.  FU with Renal.  FU lytes 6 pm  BMP afternoon     INFECTIOUS DISEASE: Follow up cultures.  Continue Anti fungal. Zyvox     HEMATOLOGICAL:  DVT prophylaxis.  fu cbc     ENDOCRINE:  Follow up FS.  Insulin protocol if needed.    MUSCULOSKELETAL: Bed rest     bed in chair Mode     Code status: full     Prognosis: Poor prognosis     Continue MICU monitoring for now.

## 2020-02-23 NOTE — PROGRESS NOTE ADULT - SUBJECTIVE AND OBJECTIVE BOX
Patient is a 75y old  Female who presents with a chief complaint of SOB and desaturation (22 Feb 2020 13:42)      Over Night Events:  No events overnight. Bowel movement with no active bleeding       ROS:     CONSTITUTIONAL:   no fever     EYES:   no discharge,     ENT:   Ears: no ear pain and no hearing problems.     CARDIOVASCULAR:   no chest pain,   no swelling  no palpitaions  no syncope    RESPIRATORY:  no SOB,  no wheezing ,  no respiratory difficulty  no sputum production    GASTROINTESTINAL:   rectal bleeding     GENITOURINARY:  no dysuria,   no frequency,     MUSCULOSKELETAL:   no back pain,   no musculoskeletal pain,  no weakness.      NEURO:   no loss of consciousness,   no gait abnormality,     PSYCHIATRIC:   no known mental health issues  no anxiety  no depression    ALLERGIC/IMMUNOLOGIC:   No active allergic or immunologic issues      PHYSICAL EXAM    ICU Vital Signs Last 24 Hrs  T(C): 36.3 (23 Feb 2020 08:35), Max: 37 (23 Feb 2020 04:00)  T(F): 97.4 (23 Feb 2020 08:35), Max: 98.6 (23 Feb 2020 04:00)  HR: 98 (23 Feb 2020 10:00) (72 - 118)  BP: 154/76 (23 Feb 2020 10:00) (108/59 - 163/81)  BP(mean): 116 (23 Feb 2020 10:00) (74 - 125)  RR: 19 (23 Feb 2020 10:00) (9 - 36)  SpO2: 100% (23 Feb 2020 10:00) (94% - 100%)    CONSTITUTIONAL:   Ill appearing.      ENT:   Airway patent,   Mouth with normal mucosa.   No thrush    EYES:   Pupils equal,   Round and reactive to light.    CARDIAC:   Normal rate,       Vascular:  Normal systolic impulse    RESPIRATORY:   No wheezing  Bilateral BS      GASTROINTESTINAL:  Abdomen soft,   Non-tender,   No guarding,   + BS    MUSCULOSKELETAL:   Range of motion is not limited, Weak motor power     NEUROLOGICAL:   Alert and oriented   No motor  deficits.    SKIN:   Skin normal color for race,   Warm and dry and intact.   No evidence of rash.    PSYCHIATRIC:   Normal mood and affect.   No apparent risk to self or others.      02-22-20 @ 07:01  -  02-23-20 @ 07:00  --------------------------------------------------------  IN:    Free Water: 1000 mL    insulin regular Infusion: 116 mL    Nepro with Carb Steady: 630 mL  Total IN: 1746 mL    OUT:    Voided: 1200 mL  Total OUT: 1200 mL    Total NET: 546 mL      02-23-20 @ 07:01  -  02-23-20 @ 10:49  --------------------------------------------------------  IN:    insulin regular Infusion: 13 mL    Packed Red Blood Cells: 277 mL  Total IN: 290 mL    OUT:    Voided: 400 mL  Total OUT: 400 mL    Total NET: -110 mL      LABS:                            6.0    4.87  )-----------( 44       ( 23 Feb 2020 05:11 )             18.9                                                02-23    146  |  106  |  71<HH>  ----------------------------<  260<H>  3.6   |  20  |  1.0      Ca    6.7<L>      23 Feb 2020 05:11  Mg     1.6     02-23    TPro  3.6<L>  /  Alb  2.1<L>  /  TBili  1.0  /  DBili  x   /  AST  86<H>  /  ALT  51<H>  /  AlkPhos  157<H>  02-23                                             LIVER FUNCTIONS - ( 23 Feb 2020 05:11 )  Alb: 2.1 g/dL / Pro: 3.6 g/dL / ALK PHOS: 157 U/L / ALT: 51 U/L / AST: 86 U/L / GGT: x                                                                     MEDICATIONS  (STANDING):  albuterol/ipratropium for Nebulization 3 milliLiter(s) Nebulizer every 6 hours  calcitriol  Solution 0.25 MICROGram(s) Oral daily  calcium carbonate    500 mG (Tums) Chewable 3 Tablet(s) Chew every 8 hours  chlorhexidine 4% Liquid 1 Application(s) Topical <User Schedule>  cyanocobalamin 1000 MICROGram(s) Oral daily  fluconAZOLE   Tablet 400 milliGRAM(s) Oral daily  gabapentin 300 milliGRAM(s) Oral at bedtime  influenza   Vaccine 0.5 milliLiter(s) IntraMuscular once  insulin regular Infusion 4 Unit(s)/Hr (4 mL/Hr) IV Continuous <Continuous>  linezolid    Tablet 600 milliGRAM(s) Oral every 12 hours  magnesium sulfate  IVPB 2 Gram(s) IV Intermittent once  methylPREDNISolone sodium succinate Injectable 60 milliGRAM(s) IV Push two times a day  montelukast 10 milliGRAM(s) Oral at bedtime  multivitamin/minerals 1 Tablet(s) Oral daily  mupirocin 2% Ointment 1 Application(s) Topical two times a day  pantoprazole  Injectable 40 milliGRAM(s) IV Push two times a day    MEDICATIONS  (PRN):  acetaminophen   Tablet .. 650 milliGRAM(s) Oral every 6 hours PRN Temp greater or equal to 38C (100.4F)  oxyCODONE    IR 10 milliGRAM(s) Oral every 8 hours PRN Severe Pain (7 - 10)  oxyCODONE    IR 5 milliGRAM(s) Oral every 6 hours PRN breakthrough pain      New X-rays reviewed:       CXR interpreted by me:    Gastric tube ok; Bilateral opacities grossly unchanged Patient is a 75y old  Female who presents with a chief complaint of SOB and desaturation (22 Feb 2020 13:42)      Over Night Events:  No events overnight. Bowel movement with no active bleeding       ROS:     CONSTITUTIONAL:   no fever     EYES:   no discharge,     ENT:   Ears: no ear pain and no hearing problems.     CARDIOVASCULAR:   no chest pain,   no swelling  no palpitaions  no syncope    RESPIRATORY:  no SOB,  no wheezing ,  no respiratory difficulty  no sputum production    GASTROINTESTINAL:   rectal bleeding     GENITOURINARY:  no dysuria,   no frequency,     MUSCULOSKELETAL:   no back pain,   no musculoskeletal pain,  no weakness.      NEURO:   no loss of consciousness,   no gait abnormality,     PSYCHIATRIC:   no known mental health issues  no anxiety  no depression    ALLERGIC/IMMUNOLOGIC:   No active allergic or immunologic issues      PHYSICAL EXAM    ICU Vital Signs Last 24 Hrs  T(C): 36.3 (23 Feb 2020 08:35), Max: 37 (23 Feb 2020 04:00)  T(F): 97.4 (23 Feb 2020 08:35), Max: 98.6 (23 Feb 2020 04:00)  HR: 98 (23 Feb 2020 10:00) (72 - 118)  BP: 154/76 (23 Feb 2020 10:00) (108/59 - 163/81)  BP(mean): 116 (23 Feb 2020 10:00) (74 - 125)  RR: 19 (23 Feb 2020 10:00) (9 - 36)  SpO2: 100% (23 Feb 2020 10:00) (94% - 100%)    CONSTITUTIONAL:   Ill appearing.      ENT:   Airway patent,   Mouth with normal mucosa.   No thrush    EYES:   Pupils equal,   Round and reactive to light.    CARDIAC:   Normal rate,       Vascular:  Normal systolic impulse    RESPIRATORY:   No wheezing  Bilateral BS      GASTROINTESTINAL:  Abdomen soft,   Non-tender,   No guarding,   + BS    MUSCULOSKELETAL:   Range of motion is not limited, Weak motor power     NEUROLOGICAL:   Unable to obtain due to patient's condition.      SKIN:   Skin normal color for race,   Warm and dry and intact.   No evidence of rash.    PSYCHIATRIC:   Normal mood and affect.   No apparent risk to self or others.      02-22-20 @ 07:01  -  02-23-20 @ 07:00  --------------------------------------------------------  IN:    Free Water: 1000 mL    insulin regular Infusion: 116 mL    Nepro with Carb Steady: 630 mL  Total IN: 1746 mL    OUT:    Voided: 1200 mL  Total OUT: 1200 mL    Total NET: 546 mL      02-23-20 @ 07:01  -  02-23-20 @ 10:49  --------------------------------------------------------  IN:    insulin regular Infusion: 13 mL    Packed Red Blood Cells: 277 mL  Total IN: 290 mL    OUT:    Voided: 400 mL  Total OUT: 400 mL    Total NET: -110 mL      LABS:                            6.0    4.87  )-----------( 44       ( 23 Feb 2020 05:11 )             18.9                                                02-23    146  |  106  |  71<HH>  ----------------------------<  260<H>  3.6   |  20  |  1.0      Ca    6.7<L>      23 Feb 2020 05:11  Mg     1.6     02-23    TPro  3.6<L>  /  Alb  2.1<L>  /  TBili  1.0  /  DBili  x   /  AST  86<H>  /  ALT  51<H>  /  AlkPhos  157<H>  02-23                                             LIVER FUNCTIONS - ( 23 Feb 2020 05:11 )  Alb: 2.1 g/dL / Pro: 3.6 g/dL / ALK PHOS: 157 U/L / ALT: 51 U/L / AST: 86 U/L / GGT: x                                                                     MEDICATIONS  (STANDING):  albuterol/ipratropium for Nebulization 3 milliLiter(s) Nebulizer every 6 hours  calcitriol  Solution 0.25 MICROGram(s) Oral daily  calcium carbonate    500 mG (Tums) Chewable 3 Tablet(s) Chew every 8 hours  chlorhexidine 4% Liquid 1 Application(s) Topical <User Schedule>  cyanocobalamin 1000 MICROGram(s) Oral daily  fluconAZOLE   Tablet 400 milliGRAM(s) Oral daily  gabapentin 300 milliGRAM(s) Oral at bedtime  influenza   Vaccine 0.5 milliLiter(s) IntraMuscular once  insulin regular Infusion 4 Unit(s)/Hr (4 mL/Hr) IV Continuous <Continuous>  linezolid    Tablet 600 milliGRAM(s) Oral every 12 hours  magnesium sulfate  IVPB 2 Gram(s) IV Intermittent once  methylPREDNISolone sodium succinate Injectable 60 milliGRAM(s) IV Push two times a day  montelukast 10 milliGRAM(s) Oral at bedtime  multivitamin/minerals 1 Tablet(s) Oral daily  mupirocin 2% Ointment 1 Application(s) Topical two times a day  pantoprazole  Injectable 40 milliGRAM(s) IV Push two times a day    MEDICATIONS  (PRN):  acetaminophen   Tablet .. 650 milliGRAM(s) Oral every 6 hours PRN Temp greater or equal to 38C (100.4F)  oxyCODONE    IR 10 milliGRAM(s) Oral every 8 hours PRN Severe Pain (7 - 10)  oxyCODONE    IR 5 milliGRAM(s) Oral every 6 hours PRN breakthrough pain      New X-rays reviewed:       CXR interpreted by me:    Gastric tube ok; Bilateral opacities grossly unchanged

## 2020-02-23 NOTE — PROGRESS NOTE ADULT - ASSESSMENT
ASSESSMENT  73y/o F w/ hx of asthma, COPD not on home O2, autoimmune hepatitis on prednisone and tacrolimus, heterozygous prothrombin gene mutation with hx of PE and DVT on coumadin with recent hospitalization in January 2020 with a diagnosis of pneumonia presents for worsening SOB, hemoptysis and cough.    IMPRESSION  #MRSA VAP    2/14 tracheal cx   Moderate Methicillin resistant Staphylococcus aureus  #Candidemia Fungitell: >500 (02-11-20 @ 11:21)    2/11 BCX +, 2/12 BCX +, 2/13 BCX (-) RIJ 2/11. Groin a-line 2/4- removed 2/13     Fungitell: <31 (02-04-20 @ 04:40), Fungitell: 49 (02-01-20 @ 11:17)    Ophtho- no endophthalmitis   #Flu + AH1, Alveolar hemorrhage, ?superimposed atypical PNA (imaging not really consistent with bacterial PNA). Recent bronch 1/20 negative for PCP, Fungal, etc, previous bronch cx with yeast (likely oral contaminant) since GMS neg    2/3 Bronch   No organisms seen, 2/3 cytopath Rare atypical cells, few ciliated bronchial cells, alveolar macrophages and inflammatory cells, mainly neutrophils.    Strep urine Ag neg, serum crypto Ag neg, CMV PCR undetectable, AFB neg    Quantiferon indeterminate    Lactate Dehydrogenase, Serum: 607 (02.03.20 @ 04:30)    Procalcitonin, Serum: 1.86:    CTA chest showing no PE but showing interval development of multifocal bilateral groundglass opacities as well as new moderate to severe bronchiectasis in the right lung,   1/13 CT b/l GGOs, compatible with intersitial edema      Serum Pro-Brain Natriuretic Peptide: 722 pg/mL (01.31.20 @ 09:25)<-- Serum Pro-Brain Natriuretic Peptide: 345 pg/mL (01.13.20 @ 12:10)    During last hospitalization: bronch cx bacterial NG, +yeast, pending ID, neg legionella, + MRSA PCR    MRSA PCR Result.: Positive (02-01-20 @ 20:40)  #Severe sepsis on admission P>90, WBC 19, RR>20, lactic acidosis Lactate, Blood: 2.9 mmol/L (01-31-20 @ 09:25)    afebrile   #Elevated Alk ph, transaminitis  #LLE wound, not infection     12/7 WCX MSSA, Kleb, bacteroides    8/2019 MRSA in a wound   #Immunosuppressed: autoimmune hepatitis on prednisone and tacrolimus    RECOMMENDATIONS  - D8 of ABX for MRSA PNA. D/C linezolid as thrombocytopenia/anemia. CXR with continued opacities/overall improved on L   - PO fluc 400mg daily end 2/27, monitor LFTs  - Trend WBC  - completed oseltamivir & abx s/p cefepime/levaquin  - on solumedrol     Spectra [id number]

## 2020-02-23 NOTE — PROGRESS NOTE ADULT - ASSESSMENT
IMPRESSION:    Acute hypoxic respiratory failure / ARDS - improving  DAH resolving  Influenza A treated   Candidemia   REJI improving   HO Autoimmune hepatitis on tacrolimus and chronic steroids   h/o hypercoagulable state/ VTE was on coumadin   Left chronic great saphenous vein thrombosis   Failed S/S     PLAN:    CNS:  Pain control    HEENT: ET care.  Oral care.      PULMONARY:  HOB @ 45 degrees. Continue Solumedrol 60 mg q12 hrs for now. Wean O2. Can try weaning to NC. Aspiration precautions.    NIV  at night and as needed     CARDIOVASCULAR:  Keep I=O.     GI: GI prophylaxis.  NG feeding.    free water 250 cc Q 4 hrs. Repeat S/S testing    RENAL:  Follow up lytes. Replete as needed.  FU with Renal.  replete Mg; Check CBC BMP LA coags today    INFECTIOUS DISEASE: Follow up cultures.  Continue Anti fungal. Zyvox     HEMATOLOGICAL:  DVT prophylaxis--> SCD;     ENDOCRINE:  Follow up FS.  Insulin protocol if needed.    MUSCULOSKELETAL: Bed rest     bed in chair Mode     Code status: full     Prognosis: Poor prognosis     Continue MICU monitoring for now.

## 2020-02-23 NOTE — PROGRESS NOTE ADULT - SUBJECTIVE AND OBJECTIVE BOX
DARNELL, DONI  75y, Female  Allergy: No Known Allergies      LOS  23d    CHIEF COMPLAINT: SOB and desaturation (23 Feb 2020 10:49)      INTERVAL EVENTS/HPI  - No acute events overnight downtrending hgb and PLT, possible 2/2 zyvox  - T(F): , Max: 98.6 (02-23-20 @ 04:00)  - Denies any worsening symptoms  - Tolerating medication  - WBC Count: 4.87 (02-23-20 @ 05:11)  WBC Count: 10.03 (02-22-20 @ 10:14)  - Creatinine, Serum: 1.0 (02-23-20 @ 05:11)  Creatinine, Serum: 1.1 (02-22-20 @ 10:14)       ROS  General: Denies rigors, nightsweats  HEENT: Denies headache, rhinorrhea, sore throat, eye pain  CV: Denies CP, palpitations  PULM: Denies wheezing, hemoptysis  GI: Denies hematemesis, hematochezia, melena  : Denies discharge, hematuria  MSK: Denies arthralgias, myalgias  SKIN: Denies rash, lesions  NEURO: Denies paresthesias, weakness  PSYCH: Denies depression, anxiety    VITALS:  T(F): 96.7, Max: 98.6 (02-23-20 @ 04:00)  HR: 102  BP: 144/62  RR: 18Vital Signs Last 24 Hrs  T(C): 35.9 (23 Feb 2020 11:00), Max: 37 (23 Feb 2020 04:00)  T(F): 96.7 (23 Feb 2020 11:00), Max: 98.6 (23 Feb 2020 04:00)  HR: 102 (23 Feb 2020 11:00) (72 - 118)  BP: 144/62 (23 Feb 2020 11:00) (108/59 - 168/86)  BP(mean): 97 (23 Feb 2020 11:00) (74 - 125)  RR: 18 (23 Feb 2020 11:00) (9 - 36)  SpO2: 99% (23 Feb 2020 11:00) (94% - 100%)    PHYSICAL EXAM:  Gen: HFNC  HEENT: Normocephalic, atraumatic  Neck: supple, no lymphadenopathy  CV: Regular rate & regular rhythm  Lungs: decreased BS at bases, no fremitus  Abdomen: Soft, BS present  Ext: Warm, well perfused, anasarca  Neuro: nonfocal, more alert today  Skin: edema/erythema, ecchymoses UE LE, open wound L knee, L wrist  Lines: no phlebitis, L IJ      FH: Non-contributory  Social Hx: Non-contributory    TESTS & MEASUREMENTS:                        6.0    4.87  )-----------( 44       ( 23 Feb 2020 05:11 )             18.9     02-23    146  |  106  |  71<HH>  ----------------------------<  260<H>  3.6   |  20  |  1.0    Ca    6.7<L>      23 Feb 2020 05:11  Mg     1.6     02-23    TPro  3.6<L>  /  Alb  2.1<L>  /  TBili  1.0  /  DBili  x   /  AST  86<H>  /  ALT  51<H>  /  AlkPhos  157<H>  02-23    eGFR if Non African American: 55 mL/min/1.73M2 (02-23-20 @ 05:11)  eGFR if African American: 64 mL/min/1.73M2 (02-23-20 @ 05:11)    LIVER FUNCTIONS - ( 23 Feb 2020 05:11 )  Alb: 2.1 g/dL / Pro: 3.6 g/dL / ALK PHOS: 157 U/L / ALT: 51 U/L / AST: 86 U/L / GGT: x               Culture - Blood (collected 02-17-20 @ 05:17)  Source: .Blood None  Final Report (02-22-20 @ 14:00):    No growth at 5 days.    Culture - Blood (collected 02-15-20 @ 04:57)  Source: .Blood None  Final Report (02-20-20 @ 18:01):    No growth at 5 days.    Culture - Sputum (collected 02-14-20 @ 17:00)  Source: .Sputum Sputum  Gram Stain (02-15-20 @ 04:56):    Few Squamous epithelial cells per low power field    Few polymorphonuclear leukocytes per low power field    Few Yeast like cells per oil power field    Few Gram variable coccobacilli per oil power field  Final Report (02-16-20 @ 17:44):    Moderate Methicillin resistant Staphylococcus aureus    Normal Respiratory Nikki present  Organism: Methicillin resistant Staphylococcus aureus (02-16-20 @ 17:44)  Organism: Methicillin resistant Staphylococcus aureus (02-16-20 @ 17:44)      -  Ampicillin/Sulbactam: R <=8/4      -  Cefazolin: R <=4      -  Clindamycin: S <=0.25      -  Erythromycin: R >4      -  Gentamicin: S <=1 Should not be used as monotherapy      -  Linezolid: S 2      -  Oxacillin: R >2      -  Penicillin: R >8      -  RIF- Rifampin: S <=1 Should not be used as monotherapy      -  Tetra/Doxy: S <=1      -  Trimethoprim/Sulfamethoxazole: S <=0.5/9.5      -  Vancomycin: S 2      Method Type: EROS    Culture - Blood (collected 02-14-20 @ 04:30)  Source: .Blood Blood-Peripheral  Final Report (02-19-20 @ 14:00):    No growth at 5 days.    Culture - Other (collected 02-13-20 @ 18:55)  Source: .Other wound  Final Report (02-15-20 @ 19:22):    No growth    Culture - Blood (collected 02-12-20 @ 04:50)  Source: .Blood None  Gram Stain (02-13-20 @ 14:24):    Growth in aerobic bottle: Yeast like cells  Final Report (02-18-20 @ 15:03):    Growth in aerobic bottle: Candida albicans    See previous culture [id number]    Culture - Blood (collected 02-11-20 @ 11:21)  Source: .Blood None  Gram Stain (02-12-20 @ 16:48):    Growth in aerobic bottle: Yeast like cells  Final Report (02-15-20 @ 14:05):    Growth in aerobic bottle: Candida albicans    ***Blood Panel PCR results on this specimen are available    approximately 3 hours after the Gram stain result.***    Gram stain, PCR, and/or culture results may not always    correspond due to difference in methodologies.    ************************************************************    This PCR assay was performed using [employer].    The following targets are tested for: Enterococcus,    vancomycin resistant enterococci, Listeria monocytogenes,    coagulase negative staphylococci, S. aureus,    methicillin resistant S. aureus, Streptococcus agalactiae    (Group B), S. pneumoniae, S. pyogenes (Group A),    Acinetobacter baumannii, Enterobacter cloacae, E. coli,    Klebsiella oxytoca, K. pneumoniae, Proteus sp.,    Serratia marcescens, Haemophilus influenzae,    Neisseria meningitidis, Pseudomonas aeruginosa, Candida    albicans, C. glabrata, C krusei, C parapsilosis,    C. tropicalis and the KPC resistance gene.  Organism: Blood Culture PCR  Candida albicans (02-15-20 @ 14:05)  Organism: Candida albicans (02-15-20 @ 14:05)      -  Fluconazole: S 0.25 Fluconazole = Data established for mucosal disease, and is generally applicable for invasive disease.  Susceptibility is dependent on achieving the maximal possible blood level.  Doses of 400 MG/day or more may be required in adults with normal renalfunction and body habitus.      -  Interpretation: See note This test method is not approved by the FDA and is for research use only.  The performance characteristics of this assay may have been determined by [facility] and [facility], [place], [place]                            N/I - No interpretation available                                                         SDD – Sensitive Dose Dependent      Method Type: YSTMIC  Organism: Blood Culture PCR (02-15-20 @ 14:05)      -  Candida albicans: Detec      Method Type: PCR    Culture - Acid Fast - Bronchial w/Smear (collected 02-03-20 @ 15:00)  Source: .Bronchial None  Preliminary Report (02-12-20 @ 15:05):    No growth at 1 week.    Culture - Fungal, Bronchial (collected 02-03-20 @ 15:00)  Source: .Bronchial None  Preliminary Report (02-12-20 @ 15:02):    No growth    Culture - Bronchial (collected 02-03-20 @ 15:00)  Source: .Bronchial None  Gram Stain (02-04-20 @ 05:52):    No polymorphonuclear cells seen per low power field    No squamous epithelial cells per low power field    No organisms seen  Final Report (02-05-20 @ 19:32):    Normal Respiratory Nikki present    Culture - Blood (collected 01-31-20 @ 09:50)  Source: .Blood Blood  Final Report (02-05-20 @ 23:01):    No growth at 5 days.    Culture - Blood (collected 01-31-20 @ 09:50)  Source: .Blood Blood  Final Report (02-05-20 @ 23:01):    No growth at 5 days.        Blood Gas Venous - Lactate: 2.1 mmoL/L (02-21-20 @ 09:10)      INFECTIOUS DISEASES TESTING  Fungitell: >500 (02-11-20 @ 11:21)  Fungitell: <31 (02-04-20 @ 04:40)  Streptococcus Pneumoniae Ag Urine: Negative (02-02-20 @ 11:00)  MRSA PCR Result.: Positive (02-01-20 @ 20:40)  Procalcitonin, Serum: 1.86 (02-01-20 @ 20:30)  Procalcitonin, Serum: 1.35 (02-01-20 @ 11:17)  Fungitell: 49 (02-01-20 @ 11:17)  Procalcitonin, Serum: 0.77 (02-01-20 @ 04:46)  Rapid RVP Result: Detected (01-31-20 @ 16:24)  Legionella Antigen, Urine: Negative (01-31-20 @ 15:36)  Streptococcus Pneumoniae Ag Urine: Negative (01-31-20 @ 15:36)  Legionella Antigen, Urine: Negative (01-20-20 @ 02:50)  Procalcitonin, Serum: 0.05 (01-16-20 @ 11:30)  MRSA PCR Result.: Positive (01-14-20 @ 13:59)  Flu A Result: Negative (01-13-20 @ 13:10)  Flu B Result: Negative (01-13-20 @ 13:10)  RSV Result: Negative (01-13-20 @ 13:10)  MRSA PCR Result.: Negative (08-13-19 @ 08:46)      RADIOLOGY & ADDITIONAL TESTS:  I have personally reviewed the last available Chest xray  CXR      CT      CARDIOLOGY TESTING  12 Lead ECG:   Ventricular Rate 100 BPM    Atrial Rate 100 BPM    P-R Interval 130 ms    QRS Duration 84 ms    Q-T Interval 366 ms    QTC Calculation(Bezet) 472 ms    P Axis 37 degrees    R Axis 1 degrees    T Axis 15 degrees    Diagnosis Line Normal sinus rhythm  Normal ECG    Confirmed by GREGG VALDES MD ([id number]) on 2/21/2020 10:14:45 AM (02-21-20 @ 07:45)  Transthoracic Echocardiogram:    EXAM:  2-D ECHO (TTE) COMPLETE        PROCEDURE DATE:  02/15/2020      INTERPRETATION:  REPORT:    TRANSTHORACIC ECHOCARDIOGRAM REPORT         Patient Name:   DONI PATRICK Accession #: [id number]  Medical Rec #:  [id number]     Height:      64.0 in 162.6 cm  YOB: 1945    Weight:      179.7 lb 81.50 kg  Patient Age:    75 years     BSA:         1.87 m²  Patient Gender: F            BP:          135/69 mmHg       Date of Exam:        2/15/2020 9:26:12 AM  Ref[facility] Phys[facility] [id number]  Sonographer:         SAVITA  Fellow:              Braulio Khalil     Reading Physician:   Porfirio Agudelo MD.    Procedure:     2D Echo/Doppler/Color Doppler Complete.  Indications:   I21.3 - ST Elevation STEMI Myocardial Infarction of unspecified  Diagnosis:     ST elevation (STEMI) myocardial infarction of unspecified site -                 I21.3  Study Details: Technically fair study. Study quality was adversely affected due                 to COPD.         Summary:   1. Normal global left ventricular systolic function.   2. LV Ejection Fraction by Caballero's Method with a biplane EF of 62 %.   3. Elevated left atrial and left ventricular end-diastolic pressures.   4. Mild concentric left ventricular hypertrophy.   5. Mildly increased LV wall thickness.   6. Normal left ventricular internal cavity size.   7. Spectral Doppler shows impaired relaxation pattern of left ventricular myocardial filling (Grade I diastolic dysfunction).   8. The mean global longitudinal peak strain by speckle tracking is -15.1% which is reduced.   9. Estimated pulmonary artery systolic pressure is 41.8 mmHg assuming a right atrial pressure of 8 mmHg, which is consistent with mild pulmonary hypertension.  10. Pulmonary hypertension is present.  11. LA volume Index is 20.5 ml/m² ml/m2.    PHYSICIAN INTERPRETATION:  Left Ventricle: The left ventricular internal cavity size is normal. Left ventricular wall thickness is mildly increased. There is mild concentric left ventricular hypertrophy.Global LV systolic function was normal. Spectral Doppler shows impaired relaxation pattern of left ventricular myocardial filling (Grade I diastolic dysfunction). Elevated left atrial and left ventricular end-diastolic pressures. The mean global longitudinal peak strain by speckle tracking is -15.1% which is reduced.       LV Wall Scoring:  All segments are normal.    Right Ventricle: Normal right ventricular size and function.  Left Atrium: Normal left atrial size. LA volume Index is 20.5 ml/m² ml/m2.  Right Atrium: Normal right atrial size.  Pericardium: There is no evidence of pericardial effusion.  Mitral Valve: Structurally normal mitral valve, with normal leaflet excursion. The mitral valve is normal in structure. No evidence of mitral valve regurgitation is seen.  Tricuspid Valve: Structurally normal tricuspid valve, with normal leaflet excursion. The tricuspid valve is normal in structure. Mild tricuspid regurgitation is visualized. Estimated pulmonary artery systolic pressure is 41.8 mmHg assuming a right atrial pressure of 8 mmHg, which is consistent with mild pulmonary hypertension.  Aortic Valve: Normal trileaflet aortic valve with normal opening. The aortic valve is normal. No evidence of aortic valve regurgitation is seen.  Pulmonic Valve: Structurally normal pulmonic valve, with normal leaflet excursion. The pulmonic valve is normal. No indication of pulmonic valve regurgitation.  Aorta: The aortic root and ascending aorta are structurally normal, with no evidence of dilitation.  Pulmonary Artery: The main pulmonary artery is normal in size. Pulmonary hypertension is present.  Venous: The inferior vena cava was normal sized, with respiratory size variation less than 50%.       2D AND M-MODE MEASUREMENTS (normal ranges within parentheses):  Left                  Normal   Aorta/Left             Normal  Ventricle:                     Atrium:  IVSd (2D):  1.19 cm  (0.7-1.1) AoV Cusp       1.66  (1.5-2.6)  LVPWd (2D): 1.12 cm  (0.7-1.1) Separation:     cm  LVIDd (2D): 4.76 cm  (3.4-5.7) Left Atrium    3.22  (1.9-4.0)  LVIDs (2D): 3.41 cm            (Mmode):        cm  LV FS (2D):  28.3 %   (>25%)   Right  IVSd        1.14 cm  (0.7-1.1) Ventricle:  (Mmode):                       RVd (Mmode):   0.81 cm  LVPWd    1.30 cm  (0.7-1.1) RVd (2D):      1.69 cm  (Mmode):                       TAPSE:         1.70 cm  LVIDd       4.99 cm  (3.4-5.7)  (Mmode):  LVIDs       2.95 cm  (Mmode):  LV FS        40.9 %   (>25%)  (Mmode):  Relative      0.47    (<0.42)  Wall  Thickness  Rel. Wall     0.52    (<0.42)  Thickness  Mm  LV Mass      127.2  Index:        g/m²  Mmode    SPECTRAL DOPPLER ANALYSIS:  LV DIASTOLIC FUNCTION:  MV Peak E: 0.88 m/s Decel Time: 167 msec  MV Peak A: 1.02 m/s  E/A Ratio: 0.86    Aortic Valve:  AoV VMax:    1.69 m/s  AoV Area, Vmax: 2.55 cm² Vmax Indx: 1.36 cm²/m²  AoV Pk Grad: 11.4 mmHg    LVOT Vmax: 1.35 m/s  LVOT VTI:  0.26 m  LVOT Diam: 2.02 cm    Mitral Valve:  MV P1/2 Time: 48.29 msec  MV Area, PHT: 4.56 cm²    Tricuspid Valve and PA/RV Systolic Pressure: TR Max Velocity: 2.91 m/s RA Pressure: 8 mmHg RVSP/PASP: 41.8 mmHg       [id number] Porfirio Agudelo MD, Electronically signed on 2/15/2020 at 12:42:31 PM         *** Final ***                    PORFIRIO AGUDELO MD  This document has been electronically signed. Feb 15 2020  9:26AM             (02-15-20 @ 09:26)      MEDICATIONS  albuterol/ipratropium for Nebulization 3  calcitriol  Solution 0.25  calcium carbonate    500 mG (Tums) Chewable 3  chlorhexidine 4% Liquid 1  cyanocobalamin 1000  fluconAZOLE   Tablet 400  gabapentin 300  influenza   Vaccine 0.5  insulin regular Infusion 4  linezolid    Tablet 600  magnesium sulfate  IVPB 2  methylPREDNISolone sodium succinate Injectable 60  montelukast 10  multivitamin/minerals 1  mupirocin 2% Ointment 1  pantoprazole  Injectable 40      WEIGHT  Weight (kg): 81.5 (02-21-20 @ 11:02)    ANTIBIOTICS:  fluconAZOLE   Tablet 400 milliGRAM(s) Oral daily  linezolid    Tablet 600 milliGRAM(s) Oral every 12 hours      All available historical records have been reviewed

## 2020-02-24 NOTE — PROGRESS NOTE ADULT - ASSESSMENT
IMPRESSION:    Acute hypoxic respiratory failure / ARDS - improving  DAH resolving  Influenza A treated   Candidemia   REJI improving   HO Autoimmune hepatitis on tacrolimus and chronic steroids   h/o hypercoagulable state/ VTE was on coumadin   Left chronic great saphenous vein thrombosis   Failed S/S     PLAN:    CNS:  Pain control    HEENT: ET care.  Oral care.      PULMONARY:  HOB @ 45 degrees. Decrease Solumedrol 60 mg qd. Wean O2. Aspiration precautions.    NIV  at night and as needed     CARDIOVASCULAR:  Keep I=O.     GI: GI prophylaxis.  NG feeding.    free water 250 cc Q 4 hrs. Repeat S/S testing    RENAL:  Follow up lytes. Replete as needed.  FU with Renal.    INFECTIOUS DISEASE: Follow up cultures.  Continue Anti fungal. Zyvox . Follow up ID    HEMATOLOGICAL:  DVT prophylaxis--> SCD; repeat CBC    ENDOCRINE:  Follow up FS.  Insulin protocol if needed.    MUSCULOSKELETAL: Bed rest     Bed in chair Mode     Code status: full     Prognosis: Poor prognosis     possible downgrade to floor in pm

## 2020-02-24 NOTE — PROGRESS NOTE ADULT - ASSESSMENT
74y female with PMH of asthma, COPD not on home O2, autoimmune hepatitis on prednisone and tacrolimus, heterozygous prothrombin gene mutation with hx of PE and DVT on coumadin presents to the hospital complaining of cough, hemoptysis, SOB and desaturation to 70s. Pt was found to be flu+ likely viral PNA complicated by diffuse alveolar hemorrhage and ARDS . Hospital course complicated by DVT in L saphenous vein with possible DVT and REJI likely secondary to ATN due to vanco.      #ARDS  1-alveolar hemorrhage   2-flu +ve with post viral pneumonia resolved   3-possible PE: cant start AC due to alveolar hemorrhage    - s/p DDAVP 4 doses   - Solumedrol  - Lasix 40   - s/p cefepime and Levaquin (completed 2/21) - stopped vanc due to REJI  - completed oseltamivir 30 mg   - C/w Duoneb   - Bronch results - neg culture, neg fungal, cytology positive for inflammatory cells, no organisms  - Repeat Bronch 2/16 showed bleeding from DAH    #Candidemia   - likely A line is source , found to be foul-smelling and surrounded by pus during removal  - blood cultures grew candida  - cultures neg since 2/14  - sputum cx grew MRSA   - oral fluconazole 14 days until 2/27  - Zyvox 7 days total- stopped 2/23 for thrombocytopenia/anemia  - ID following  - trend CBC and EKG daily (QTc)     #Rectal Bleeding   - GI following   - received 1U of blood 2/23, hemoglobin stable  - Protonix switch to PO   - FlexSig showed 2 rectal ulcers one clean one crater, s/p clipping  - keep HGB above 8  - active type and screen   - H/H stable  - cbc q24   - Avoid Dignshield or rectal tube     #Hypernatremia  - free water 250 ml q4  - Na+ 148 (2/24)    #REJI oliguric likely ATN with Vanc toxicity   - Resolved    # 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  - duplex shows DVT consistent with prior  - might need IVC filter---Agree  - not candidate for AC at this time due to alveolar hemorrhage     #Immunosuppressed: Autoimmune Hepatitis   - solumedrol 60 mg qd  - prednisone 60 mg PO qd at home now on solumedrol   - CMV PCR neg this admission   - f/u repeat tarcolimus lvl  - With high risk for infectious processes while on chronic immunosuppression therapy- see candida above    # HTN  - holding amlodipine 10 and lopressor 50   - Monitor BPs    # heterozygous prothrombin gene mutation with hx of PE and DVT on coumadin  - holding coumadin for now due to alveolar hemorrhage   - monitor coags    #0.5 cm of GB polyp  -needs f/u US in 12 months     # Hx of asthma  - C/w montelukast qd    #Deconditioning   - pt severely deconditioned due to laying in bed and chronic steroid use   - PT/OT/Rehab once downgraded     # GI PPx: Protonix 40 mg PO qd  # DVT PPx: sequentials to right leg only  # Activity: bedrest   # Dispo: ICU--Possibly out of  ICU today per critical care team  # Code Status: FULL

## 2020-02-24 NOTE — SWALLOW BEDSIDE ASSESSMENT ADULT - SLP GENERAL OBSERVATIONS
Pt awake, 3L o2 via NC. Pt with improving vocal quality from previous evaluation. Pts son and  at bedside.

## 2020-02-24 NOTE — SWALLOW BEDSIDE ASSESSMENT ADULT - SWALLOW EVAL: DIAGNOSIS
PO trials not appropriate 2' lethargy, severe generalized weakness, persistent aphonia. Pt remains on HFNC, 40lpm, 40% o2
PO trials not appropriate 2' pt aphonic, prolonged intubation, severe generalized weakness. Pt on HFNC 50lpm at 40% o2
PO trials not appropriate 2' pt with severe generalized weakness, poor mental status, remains aphonic. Poor respiratory status requiring HFNC, 40lpm 40%o2.
Suspected pharyngeal dysphagia for puree and nectar thick liquids
PO trials not appropriate at this time 2' prolonged intubation, aphonia and pt currently requiring high-flow nc (settings: 64% o2 60 L/min) w/ increased risk for aspiration; pt also w/ +audible chest congestion unable to clear
+ expectoration of puree trials. Pt. refused further consistencies.

## 2020-02-24 NOTE — PROGRESS NOTE ADULT - SUBJECTIVE AND OBJECTIVE BOX
Patient is a 75y old  Female who presents with a chief complaint of SOB and desaturation (24 Feb 2020 06:20)        Over Night Events:    no events overnight.      ROS:     CONSTITUTIONAL:   no fever   no chills.  no weight gain   no weight loss    EYES:   no discharge,   no pain  no redness,   no visual changes.    ENT:   Ears: no ear pain and no hearing problems.  Nose: no nasal congestion and no nasal drainage.  Mouth/Throat: no dysphagia,  no hoarseness and no throat pain.  Neck: no lumps, no pain, no stiffness and no swollen glands.     CARDIOVASCULAR:   no chest pain,   no swelling  no palpitaions  no syncope    RESPIRATORY:  see HPI    GASTROINTESTINAL:   see HPI    GENITOURINARY:  no dysuria,   no frequency,   no urgency  no hematuria.    MUSCULOSKELETAL:   no back pain,   no musculoskeletal pain,  no weakness.    SKIN:   no jaundice,   no lesions,   no pruritis,   no rashes.    NEURO:   no loss of consciousness,   no gait abnormality,   no headache,   no sensory deficits,   no weakness.    PSYCHIATRIC:   no known mental health issues  no anxiety  no depression    ALLERGIC/IMMUNOLOGIC:   No active allergic or immunologic issues        PHYSICAL EXAM    ICU Vital Signs Last 24 Hrs  T(C): 35.6 (24 Feb 2020 04:00), Max: 36.2 (23 Feb 2020 13:00)  T(F): 96 (24 Feb 2020 04:00), Max: 97.2 (23 Feb 2020 13:00)  HR: 74 (24 Feb 2020 07:00) (70 - 110)  BP: 170/81 (24 Feb 2020 07:00) (131/70 - 174/86)  BP(mean): 123 (24 Feb 2020 07:00) (95 - 138)  ABP: --  ABP(mean): --  RR: 18 (24 Feb 2020 07:00) (15 - 35)  SpO2: 100% (24 Feb 2020 07:00) (87% - 100%)      CONSTITUTIONAL:  Well nourished.  NAD    ENT:   Airway patent,   Mouth with normal mucosa.   No thrush    CARDIAC:   Normal rate,   irregular rhythm.     No edema    RESPIRATORY:   NO wheezing  Bilateral BS  Normal chest expansion  Not tachypneic,  No use of accessory muscles    GASTROINTESTINAL:  Abdomen soft,   Non-tender,   No guarding,   + BS    MUSCULOSKELETAL:   Range of motion is not limited,  No clubbing, cyanosis    NEUROLOGICAL:   Alert and oriented x3  No motor  deficits.  pertinent DTRs normal    SKIN:   Skin normal color for race,   warm, dry   No evidence of rash.        02-23-20 @ 07:01  -  02-24-20 @ 07:00  --------------------------------------------------------  IN:    dextrose 10%: 150 mL    Enteral Tube Flush: 500 mL    Free Water: 250 mL    insulin regular Infusion: 156 mL    IV PiggyBack: 100 mL    Nepro with Carb Steady: 840 mL    Packed Red Blood Cells: 277 mL  Total IN: 2273 mL    OUT:    Indwelling Catheter - Urethral: 1100 mL    Voided: 1150 mL  Total OUT: 2250 mL    Total NET: 23 mL          LABS:                            9.6    4.35  )-----------( 82       ( 24 Feb 2020 04:52 )             28.6                                               02-24    148<H>  |  108  |  70<HH>  ----------------------------<  106<H>  3.3<L>   |  24  |  1.0    Ca    7.4<L>      24 Feb 2020 04:52  Phos  3.3     02-24  Mg     2.5     02-24    TPro  4.4<L>  /  Alb  2.5<L>  /  TBili  1.1  /  DBili  x   /  AST  57<H>  /  ALT  60<H>  /  AlkPhos  173<H>  02-24      PT/INR - ( 24 Feb 2020 04:52 )   PT: TNP sec;   INR: TNP ratio                                                                                              LIVER FUNCTIONS - ( 24 Feb 2020 04:52 )  Alb: 2.5 g/dL / Pro: 4.4 g/dL / ALK PHOS: 173 U/L / ALT: 60 U/L / AST: 57 U/L / GGT: x                                                                                                                                       MEDICATIONS  (STANDING):  albuterol/ipratropium for Nebulization 3 milliLiter(s) Nebulizer every 6 hours  amLODIPine   Tablet 10 milliGRAM(s) Oral daily  calcitriol  Solution 0.25 MICROGram(s) Oral daily  calcium carbonate    500 mG (Tums) Chewable 3 Tablet(s) Chew every 8 hours  chlorhexidine 4% Liquid 1 Application(s) Topical <User Schedule>  cyanocobalamin 1000 MICROGram(s) Oral daily  fluconAZOLE   Tablet 400 milliGRAM(s) Oral daily  gabapentin 300 milliGRAM(s) Oral at bedtime  influenza   Vaccine 0.5 milliLiter(s) IntraMuscular once  insulin regular Infusion 4 Unit(s)/Hr (4 mL/Hr) IV Continuous <Continuous>  methylPREDNISolone sodium succinate Injectable 60 milliGRAM(s) IV Push two times a day  metoprolol tartrate 50 milliGRAM(s) Oral two times a day  montelukast 10 milliGRAM(s) Oral at bedtime  multivitamin/minerals 1 Tablet(s) Oral daily  mupirocin 2% Ointment 1 Application(s) Topical two times a day  pantoprazole  Injectable 40 milliGRAM(s) IV Push two times a day    MEDICATIONS  (PRN):  acetaminophen   Tablet .. 650 milliGRAM(s) Oral every 6 hours PRN Temp greater or equal to 38C (100.4F)  oxyCODONE    IR 10 milliGRAM(s) Oral every 8 hours PRN Severe Pain (7 - 10)  oxyCODONE    IR 5 milliGRAM(s) Oral every 6 hours PRN breakthrough pain      New X-rays reviewed:                                                                                  ECHO    CXR interpreted by me:

## 2020-02-24 NOTE — PROGRESS NOTE ADULT - SUBJECTIVE AND OBJECTIVE BOX
PATIENT:  DONI PATRICK  [id number]    CHIEF COMPLAINT:  Patient is a 75y old  Female who presents with a chief complaint of SOB and desaturation (2020 10:27)      INTERVAL HISTORYOVERNIGHT EVENTS:  no overnight events.        MEDICATIONS:  MEDICATIONS  (STANDING):  albuterol/ipratropium for Nebulization 3 milliLiter(s) Nebulizer every 6 hours  amLODIPine   Tablet 10 milliGRAM(s) Oral daily  calcitriol  Solution 0.25 MICROGram(s) Oral daily  calcium carbonate    500 mG (Tums) Chewable 3 Tablet(s) Chew every 8 hours  chlorhexidine 4% Liquid 1 Application(s) Topical <User Schedule>  cyanocobalamin 1000 MICROGram(s) Oral daily  fluconAZOLE   Tablet 400 milliGRAM(s) Oral daily  gabapentin 300 milliGRAM(s) Oral at bedtime  influenza   Vaccine 0.5 milliLiter(s) IntraMuscular once  insulin regular Infusion 4 Unit(s)/Hr (4 mL/Hr) IV Continuous <Continuous>  metoprolol tartrate 50 milliGRAM(s) Oral two times a day  montelukast 10 milliGRAM(s) Oral at bedtime  multivitamin/minerals 1 Tablet(s) Oral daily  mupirocin 2% Ointment 1 Application(s) Topical two times a day  pantoprazole  Injectable 40 milliGRAM(s) IV Push two times a day    MEDICATIONS  (PRN):  acetaminophen   Tablet .. 650 milliGRAM(s) Oral every 6 hours PRN Temp greater or equal to 38C (100.4F)  oxyCODONE    IR 10 milliGRAM(s) Oral every 8 hours PRN Severe Pain (7 - 10)  oxyCODONE    IR 5 milliGRAM(s) Oral every 6 hours PRN breakthrough pain      ALLERGIES:  Allergies    No Known Allergies    Intolerances        OBJECTIVE:  ICU Vital Signs Last 24 Hrs  T(C): 35.6 (2020 04:00), Max: 36.2 (2020 13:00)  T(F): 96 (2020 04:00), Max: 97.2 (2020 13:00)  HR: 74 (2020 07:00) (70 - 110)  BP: 170/81 (2020 07:00) (131/70 - 174/86)  BP(mean): 123 (2020 07:00) (95 - 138)  ABP: --  ABP(mean): --  RR: 18 (2020 07:00) (15 - 35)  SpO2: 100% (2020 07:00) (87% - 100%)      Adult Advanced Hemodynamics Last 24 Hrs  CVP(mm Hg): --  CVP(cm H2O): --  CO: --  CI: --  PA: --  PA(mean): --  PCWP: --  SVR: --  SVRI: --  PVR: --  PVRI: --  CAPILLARY BLOOD GLUCOSE      POCT Blood Glucose.: 252 mg/dL (2020 10:09)  POCT Blood Glucose.: 275 mg/dL (2020 09:03)  POCT Blood Glucose.: 183 mg/dL (2020 08:08)  POCT Blood Glucose.: 137 mg/dL (2020 06:54)  POCT Blood Glucose.: 73 mg/dL (2020 05:50)  POCT Blood Glucose.: 48 mg/dL (2020 05:09)  POCT Blood Glucose.: 47 mg/dL (2020 04:28)  POCT Blood Glucose.: 41 mg/dL (2020 03:56)  POCT Blood Glucose.: 74 mg/dL (2020 02:11)  POCT Blood Glucose.: 123 mg/dL (2020 01:03)  POCT Blood Glucose.: 145 mg/dL (2020 00:08)  POCT Blood Glucose.: 189 mg/dL (2020 23:07)  POCT Blood Glucose.: 180 mg/dL (2020 22:10)  POCT Blood Glucose.: 168 mg/dL (2020 21:05)  POCT Blood Glucose.: 212 mg/dL (2020 20:07)  POCT Blood Glucose.: 203 mg/dL (2020 18:58)  POCT Blood Glucose.: 242 mg/dL (2020 18:10)  POCT Blood Glucose.: 303 mg/dL (2020 16:55)  POCT Blood Glucose.: 339 mg/dL (2020 15:53)  POCT Blood Glucose.: 355 mg/dL (2020 14:59)  POCT Blood Glucose.: 214 mg/dL (2020 12:58)  POCT Blood Glucose.: 213 mg/dL (2020 11:46)  POCT Blood Glucose.: 158 mg/dL (2020 11:03)    CAPILLARY BLOOD GLUCOSE      POCT Blood Glucose.: 252 mg/dL (2020 10:09)    I&O's Summary    2020 07:01  -  2020 07:00  --------------------------------------------------------  IN: 2273 mL / OUT: 2250 mL / NET: 23 mL      Daily     Daily Weight in k.5 (2020 03:00)    PHYSICAL EXAMINATION:  General: WN/WD NAD  HEENT: PERRLA, EOMI, moist mucous membranes  Neurology: A&Ox3, nonfocal, ARIAS x 4  Respiratory: b/l breath sounds  CV: irregular, S1S2, no murmurs, rubs or gallops  Abdominal: Soft, NT, ND +BS, Last BM  Extremities: + peripheral pulses, edema on limbs  Incisions:   Tubes: NG feeding tube    LABS:                          9.6    4.35  )-----------( 82       ( 2020 04:52 )             28.6     02-24    148<H>  |  108  |  70<HH>  ----------------------------<  106<H>  3.3<L>   |  24  |  1.0    Ca    7.4<L>      2020 04:52  Phos  3.3     02-24  Mg     2.5     02-24    TPro  4.4<L>  /  Alb  2.5<L>  /  TBili  1.1  /  DBili  x   /  AST  57<H>  /  ALT  60<H>  /  AlkPhos  173<H>  02-24    LIVER FUNCTIONS - ( 2020 04:52 )  Alb: 2.5 g/dL / Pro: 4.4 g/dL / ALK PHOS: 173 U/L / ALT: 60 U/L / AST: 57 U/L / GGT: x           PT/INR - ( 2020 04:52 )   PT: TNP sec;   INR: TNP ratio                     TELEMETRY:     EKG:     IMAGING:

## 2020-02-24 NOTE — PROGRESS NOTE ADULT - SUBJECTIVE AND OBJECTIVE BOX
Chart reviewed, patient examined. Pertinent results reviewed.  specialist f/u reviewed  HD#[id number]  Now extubated After a prolonged course of ventilator support, then high flow oxygen.  DONI PATRICK  [id number]    CHIEF COMPLAINT:  Patient is a 75y old  Female who presented with a chief complaint of SOB and desaturation (2020 10:27), It was found to have viral pneumonia with respiratory failure and was intubated and received multispecialty care.      INTERVAL HISTORYOVERNIGHT EVENTS:  no overnight events.        MEDICATIONS:  MEDICATIONS  (STANDING):  albuterol/ipratropium for Nebulization 3 milliLiter(s) Nebulizer every 6 hours  amLODIPine   Tablet 10 milliGRAM(s) Oral daily  calcitriol  Solution 0.25 MICROGram(s) Oral daily  calcium carbonate    500 mG (Tums) Chewable 3 Tablet(s) Chew every 8 hours  chlorhexidine 4% Liquid 1 Application(s) Topical <User Schedule>  cyanocobalamin 1000 MICROGram(s) Oral daily  fluconAZOLE   Tablet 400 milliGRAM(s) Oral daily  gabapentin 300 milliGRAM(s) Oral at bedtime  influenza   Vaccine 0.5 milliLiter(s) IntraMuscular once  insulin regular Infusion 4 Unit(s)/Hr (4 mL/Hr) IV Continuous <Continuous>  metoprolol tartrate 50 milliGRAM(s) Oral two times a day  montelukast 10 milliGRAM(s) Oral at bedtime  multivitamin/minerals 1 Tablet(s) Oral daily  mupirocin 2% Ointment 1 Application(s) Topical two times a day  pantoprazole  Injectable 40 milliGRAM(s) IV Push two times a day    MEDICATIONS  (PRN):  acetaminophen   Tablet .. 650 milliGRAM(s) Oral every 6 hours PRN Temp greater or equal to 38C (100.4F)  oxyCODONE    IR 10 milliGRAM(s) Oral every 8 hours PRN Severe Pain (7 - 10)  oxyCODONE    IR 5 milliGRAM(s) Oral every 6 hours PRN breakthrough pain      ALLERGIES:  Allergies    No Known Allergies    Intolerances        OBJECTIVE:  Vital Signs Last 24 Hrs  T(C): 35.9 (2020 18:00), Max: 35.9 (2020 00:00)  T(F): 96.7 (2020 18:00), Max: 96.7 (2020 18:00)  HR: 81 (2020 21:57) (68 - 110)  BP: 154/67 (2020 21:57) (111/52 - 174/70)  BP(mean): 91 (2020 19:00) (73 - 129)  RR: 18 (2020 21:57) (10 - 45)  SpO2: 94% (2020 22:30) (87% - 100%)  Adult Advanced Hemodynamics Last 24 Hrs  CVP(mm Hg): --  CVP(cm H2O): --  CO: --  CI: --  PA: --  PA(mean): --  PCWP: --  SVR: --  SVRI: --  PVR: --  PVRI: --  CAPILLARY BLOOD GLUCOSE      POCT Blood Glucose.: 252 mg/dL (2020 10:09)  POCT Blood Glucose.: 275 mg/dL (2020 09:03)  POCT Blood Glucose.: 183 mg/dL (2020 08:08)  POCT Blood Glucose.: 137 mg/dL (2020 06:54)  POCT Blood Glucose.: 73 mg/dL (2020 05:50)  POCT Blood Glucose.: 48 mg/dL (2020 05:09)  POCT Blood Glucose.: 47 mg/dL (2020 04:28)  POCT Blood Glucose.: 41 mg/dL (2020 03:56)  POCT Blood Glucose.: 74 mg/dL (2020 02:11)  POCT Blood Glucose.: 145 mg/dL (2020 00:08)  POCT Blood Glucose.: 189 mg/dL (2020 23:07)  POCT Blood Glucose.: 180 mg/dL (2020 22:10)  POCT Blood Glucose.: 168 mg/dL (2020 21:05)  POCT Blood Glucose.: 242 mg/dL (2020 18:10)  POCT Blood Glucose.: 303 mg/dL (2020 16:55)  POCT Blood Glucose.: 339 mg/dL (2020 15:53)  POCT Blood Glucose.: 355 mg/dL (2020 14:59)  POCT Blood Glucose.: 214 mg/dL (2020 12:58)  POCT Blood Glucose.: 213 mg/dL (2020 11:46)  POCT Blood Glucose.: 158 mg/dL (2020 11:03)    CAPILLARY BLOOD GLUCOSE      POCT Blood Glucose.: 252 mg/dL (2020 10:09)    I&O's Summary    2020 07:01  -  2020 07:00  --------------------------------------------------------  IN: 2273 mL / OUT: 2250 mL / NET: 23 mL      Daily     Daily Weight in k.5 (2020 03:00)    PHYSICAL EXAMINATION:  General: WN/WD NAD; NC O2(3L) Recognizes me and hold conversation.  Hoarse voice+  HEENT: PERRLA, EOMI, min moist mucous membranes  Neurology: A&Ox3, nonfocal, ARIAS x 4  Respiratory: b/l breath sounds- Or clear  CV: irregular, S1S2, no murmurs, rubs or gallops  Abdominal: Soft, NT, ND +BS, Last BM  Extremities: + peripheral pulses, edema on limbs With multiple ecchymoses and few avulsed areas  Incisions:   Tubes: NG feeding tube    LABS:                          9.6    4.35  )-----------( 82       ( 2020 04:52 )             28.6     02-24    148<H>  |  108  |  70<HH>  ----------------------------<  106<H>  3.3<L>   |  24  |  1.0    Ca    7.4<L>      2020 04:52  Phos  3.3     02-24  Mg     2.5     02-24    TPro  4.4<L>  /  Alb  2.5<L>  /  TBili  1.1  /  DBili  x   /  AST  57<H>  /  ALT  60<H>  /  AlkPhos  173<H>  02-24    LIVER FUNCTIONS - ( 2020 04:52 )  Alb: 2.5 g/dL / Pro: 4.4 g/dL / ALK PHOS: 173 U/L / ALT: 60 U/L / AST: 57 U/L / GGT: x           PT/INR - ( 2020 04:52 )   PT: TNP sec;   INR: TNP ratio                     TELEMETRY:     EKG:     IMAGING:
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
## 2020-02-24 NOTE — SWALLOW BEDSIDE ASSESSMENT ADULT - SWALLOW EVAL: RECOMMENDED DIET
NPO with alternate means of nutrition and hydration
NPO w/ alt means of nutrition/hydration; frequent oral care
NPO and alternate means for nutrition/hydration.

## 2020-02-24 NOTE — SWALLOW BEDSIDE ASSESSMENT ADULT - NS SPL SWALLOW CLINIC TRIAL FT
suspected pharyngeal dysphagia
Unable to assess 2/2 expectoration of trial and refusal for further PO trials.

## 2020-02-24 NOTE — CHART NOTE - NSCHARTNOTEFT_GEN_A_CORE
Registered Dietitian Follow-Up    ***Scroll to the bottom for RD recommendation***    Patient Profile Reviewed                           Yes [x]   No []  Nutrition History Previously Obtained        Yes [x]  No []          PERTINENT SUBJECTIVE INFORMATION (LATEST AS OF TODAY):  - FAMILY at bedside.   - Pt is awake but slightly more alert than previously.  - spoken with SLP 2/24, - pt failed SLP, scheduled for FEES 2/25.   - pt remains in NEPRO at 210ml q6hr (NGT) + Beneprotein x1  = 1512 kcal/ 73g protein        PERTINENT MEDICAL INFORMATIONS:  (1) p/w SOB+ desat.   (2) Acute hypoxic resp/ ARDS - improving.   (3) DAH resolving. FLU A treated.   (4) REJI improving.   (5) Failing SLP  (6) F/u Renal lytes. ID consulted.           DIET ORDER:   NEPRO at 210ml q6hr (NGT) + Beneprotein x1  = 1512 kcal/ 73g protrein        ANTHROPOMETRICS:  - Ht.  162.6cm  - Wt.   (2/1): 74.8kg   (2/2): 74.3kg  (2/3): 81.5kg  (2/4): 78.4kg  (2/8): 87.7kg  (2/10): 86.7kg  (2/16): 86kg   (2/19): 87.9kg  (2/21): 81.5kg  (2/24): 74.5kg - weight trended up likely from edema. now back to baseline, however, likely edema masking wt loss.   - BMI. 28- 30.8  - IBW. 54.5kg       PERTINENT LAB DATA:  2/24: hh 9.6/28.6, Na 148, K 3.3, BUN 70, Cr 1.0, Ca 7.4, Albumin 2.5, LFT elevated, GFR 55, mag 2.5  PERTINENT MEDS: insulin, amlodipine, metoprolol, protonix, oxy, acetaminophen, calcitriol, b12, MVI      PHYSICAL FINDINGS  - APPEARANCE:        awake, 2+ generalized edema  - GI FUNCTION:       LBM 2/23 per EMR  - TUBES:                       - ORAL/MOUTH:      NPO, failed SLP 2/24  - SKIN:                        ecchymosis        NUTRITION REQUIREMENTS  WEIGHT USED:                          Ht: 162.6cm, Wt: 74.8kg (more likely pt's wt PTA, BMI: 28.5  ESTIMATED ENERGY NEEDS:       CONTINUE [  ]      ADJUST [ x ]  - since 2/20    ESTIMATED ENERGY NEEDS:         1442-7981 kcal/day (1228 MSJ x 1.2-1.3)  ESTIMATED PROTEIN NEEDS:        67-75 g/day (0.9-1.0 g/kg of ABW) - poor renal status at this time, adjust prn  ESTIMATED FLUID NEEDS:             fluid per ICU team    CURRENT NUTRIENT NEEDS:    receiving 1512 kcal/ 73g protein          [  x] PREVIOUS NUTRITION DIAGNOSIS:    (1) Inadequate energy intake  (meeting, but there is no indication of using this formula)            [x  ] ONGOING        [  ] RESOLVED            PATIENT INTERVENTION:    [  ] ORAL        [ x] EN/TF     GOAL/EXPECTED OUTCOME:     pt to meet and tolerate >85% of estimated kcal/protein via TF upon f/u in 3 days.   INDICATOR/MONITORING:       RD to monitor diet order, energy intake, body composition, nutrition focused physical findings (EN tolerance, s/s, appetite, renal profile)  NUTRITION INTERVENTION:        Enteral nutrition, coordination of care      RECS: (1)  Continue f/u with SLP eval. (2) For the 3rd time, there is no indication to use Nepro formula at this time. Pt with low Potassium and likely with RJEI with Cr at 1.0, BUN 70.  Change formula to Osmolite 1.5 at 240ml q6hr with No-Carb Prosource TF packet x1/day (each feed may run for 2 hour long). This regimen gives a total of 1460 kcal/ 71g protein/ 1710mg potassium (will monitor renal function)/  730mL free water, additional flushes per ICU team. Registered Dietitian Follow-Up    ***Scroll to the bottom for RD recommendation***    Patient Profile Reviewed                           Yes [x]   No []  Nutrition History Previously Obtained        Yes [x]  No []          PERTINENT SUBJECTIVE INFORMATION (LATEST AS OF TODAY):  - FAMILY at bedside.   - Pt is awake but slightly more alert than previously.  - spoken with SLP 2/24, - pt failed SLP, scheduled for FEES 2/25.   - pt remains in NEPRO at 210ml q6hr (NGT) + Beneprotein x1  = 1512 kcal/ 73g protein        PERTINENT MEDICAL INFORMATIONS:  (1) p/w SOB+ desat.   (2) Acute hypoxic resp/ ARDS - improving.   (3) DAH resolving. FLU A treated.   (4) REJI improving.   (5) Failing SLP  (6) F/u Renal lytes. ID consulted.           DIET ORDER:   NEPRO at 210ml q6hr (NGT) + Beneprotein x1  = 1512 kcal/ 73g protrein        ANTHROPOMETRICS:  - Ht.  162.6cm  - Wt.   (2/1): 74.8kg   (2/2): 74.3kg  (2/3): 81.5kg  (2/4): 78.4kg  (2/8): 87.7kg  (2/10): 86.7kg  (2/16): 86kg   (2/19): 87.9kg  (2/21): 81.5kg  (2/24): 74.5kg - weight trended up likely from edema. now back to baseline, however, likely edema masking wt loss.   - BMI. 28- 30.8  - IBW. 54.5kg       PERTINENT LAB DATA:  2/24: hh 9.6/28.6, Na 148, K 3.3, BUN 70, Cr 1.0, Ca 7.4, Albumin 2.5, LFT elevated, GFR 55, mag 2.5  PERTINENT MEDS: insulin, amlodipine, metoprolol, protonix, oxy, acetaminophen, calcitriol, b12, MVI      PHYSICAL FINDINGS  - APPEARANCE:        awake, 2+ generalized edema  - GI FUNCTION:       LBM 2/23 per EMR  - TUBES:                       - ORAL/MOUTH:      NPO, failed SLP 2/24  - SKIN:                        ecchymosis        NUTRITION REQUIREMENTS  WEIGHT USED:                          Ht: 162.6cm, Wt: 74.8kg (more likely pt's wt PTA, BMI: 28.5  ESTIMATED ENERGY NEEDS:       CONTINUE [  ]      ADJUST [ x ]  - since 2/20    ESTIMATED ENERGY NEEDS:         5771-3402 kcal/day (1228 MSJ x 1.2-1.3)  ESTIMATED PROTEIN NEEDS:        67-75 g/day (0.9-1.0 g/kg of ABW) - poor renal status at this time, adjust prn  ESTIMATED FLUID NEEDS:             fluid per ICU team    CURRENT NUTRIENT NEEDS:    receiving 1512 kcal/ 73g protein          [  x] PREVIOUS NUTRITION DIAGNOSIS:    (1) Inadequate energy intake  (meeting, but there is no indication of using this formula)            [x  ] ONGOING        [  ] RESOLVED            PATIENT INTERVENTION:    [  ] ORAL        [ x] EN/TF     GOAL/EXPECTED OUTCOME:     pt to meet and tolerate >85% of estimated kcal/protein via TF upon f/u in 3 days.   INDICATOR/MONITORING:       RD to monitor diet order, energy intake, body composition, nutrition focused physical findings (EN tolerance, s/s, appetite, renal profile)  NUTRITION INTERVENTION:        Enteral nutrition, coordination of care      RECS: (1) For the 4th times, there is no indication to use Nepro formula at this time. Pt with low Potassium and likely with REJI with Cr at 1.0, BUN 70.  Change formula to Osmolite 1.5 at 240ml q6hr with No-Carb Prosource TF packet x1/day (each feed may run for 2 hour long). This regimen gives a total of 1460 kcal/ 71g protein/ 1710mg potassium (will monitor renal function)/  730mL free water, additional flushes per ICU team. (2)  Continue f/u with SLP eval.

## 2020-02-24 NOTE — PROGRESS NOTE ADULT - SUBJECTIVE AND OBJECTIVE BOX
DONI PATRICK  75y, Female    All available historical data reviewed    OVERNIGHT EVENTS:  no fevers    ROS:  no pressors  no cough      VITALS:  T(F): 96.5, Max: 97.4 (02-23-20 @ 08:35)  HR: 86  BP: 131/70  RR: 34Vital Signs Last 24 Hrs  T(C): 35.8 (23 Feb 2020 19:00), Max: 36.3 (23 Feb 2020 08:35)  T(F): 96.5 (23 Feb 2020 19:00), Max: 97.4 (23 Feb 2020 08:35)  HR: 86 (24 Feb 2020 04:00) (86 - 111)  BP: 131/70 (24 Feb 2020 04:00) (131/70 - 174/86)  BP(mean): 107 (24 Feb 2020 04:00) (95 - 138)  RR: 34 (24 Feb 2020 04:00) (17 - 34)  SpO2: 100% (24 Feb 2020 04:00) (94% - 100%)    TESTS & MEASUREMENTS:                        9.6    4.35  )-----------( 82       ( 24 Feb 2020 04:52 )             28.6     02-23    147<H>  |  106  |  72<HH>  ----------------------------<  211<H>  3.1<L>   |  19  |  1.2    Ca    7.6<L>      23 Feb 2020 19:06  Mg     3.1     02-23    TPro  4.9<L>  /  Alb  2.7<L>  /  TBili  1.0  /  DBili  x   /  AST  47<H>  /  ALT  62<H>  /  AlkPhos  206<H>  02-23    LIVER FUNCTIONS - ( 23 Feb 2020 19:06 )  Alb: 2.7 g/dL / Pro: 4.9 g/dL / ALK PHOS: 206 U/L / ALT: 62 U/L / AST: 47 U/L / GGT: x                   RADIOLOGY & ADDITIONAL TESTS:  Personal review of radiological diagnostics performed  Echo and EKG results noted when applicable.     MEDICATIONS:  acetaminophen   Tablet .. 650 milliGRAM(s) Oral every 6 hours PRN  albuterol/ipratropium for Nebulization 3 milliLiter(s) Nebulizer every 6 hours  amLODIPine   Tablet 10 milliGRAM(s) Oral daily  calcitriol  Solution 0.25 MICROGram(s) Oral daily  calcium carbonate    500 mG (Tums) Chewable 3 Tablet(s) Chew every 8 hours  chlorhexidine 4% Liquid 1 Application(s) Topical <User Schedule>  cyanocobalamin 1000 MICROGram(s) Oral daily  fluconAZOLE   Tablet 400 milliGRAM(s) Oral daily  gabapentin 300 milliGRAM(s) Oral at bedtime  influenza   Vaccine 0.5 milliLiter(s) IntraMuscular once  insulin regular Infusion 4 Unit(s)/Hr IV Continuous <Continuous>  methylPREDNISolone sodium succinate Injectable 60 milliGRAM(s) IV Push two times a day  metoprolol tartrate 50 milliGRAM(s) Oral two times a day  montelukast 10 milliGRAM(s) Oral at bedtime  multivitamin/minerals 1 Tablet(s) Oral daily  mupirocin 2% Ointment 1 Application(s) Topical two times a day  oxyCODONE    IR 10 milliGRAM(s) Oral every 8 hours PRN  oxyCODONE    IR 5 milliGRAM(s) Oral every 6 hours PRN  pantoprazole  Injectable 40 milliGRAM(s) IV Push two times a day      ANTIBIOTICS:  fluconAZOLE   Tablet 400 milliGRAM(s) Oral daily

## 2020-02-24 NOTE — CHART NOTE - NSCHARTNOTEFT_GEN_A_CORE
74y female with PMH of asthma, COPD not on home O2, autoimmune hepatitis on prednisone and tacrolimus, heterozygous prothrombin gene mutation with hx of PE and DVT on coumadin presents to the hospital complaining of cough, hemoptysis, SOB and desaturation to 70s. Pt was found to be flu+ likely viral PNA complicated by diffuse alveolar hemorrhage and ARDS . Hospital course complicated by DVT in L saphenous vein with possible DVT and REJI likely secondary to ATN due to vanco.      #ARDS  1-alveolar hemorrhage   2-flu +ve with post viral pneumonia resolved   3-possible PE: cant start AC due to alveolar hemorrhage    - s/p DDAVP 4 doses   - Solumedrol  - Lasix 40   - s/p cefepime and Levaquin (completed 2/21) - stopped vanc due to REJI  - completed oseltamivir 30 mg   - C/w Duoneb   - Bronch results - neg culture, neg fungal, cytology positive for inflammatory cells, no organisms  - Repeat Bronch 2/16 showed bleeding from DAH    #Candidemia   - likely A line is source , found to be foul-smelling and surrounded by pus during removal  - blood cultures grew candida  - cultures neg since 2/14  - sputum cx grew MRSA   - oral fluconazole 14 days until 2/27  - Zyvox 7 days total- stopped 2/23 for thrombocytopenia/anemia  - ID following  - trend CBC and EKG daily (QTc)     #Rectal Bleeding   - GI following   - received 1U of blood 2/23, hemoglobin stable  - Protonix switch to PO   - FlexSig showed 2 rectal ulcers one clean one crater, s/p clipping  - keep HGB above 8  - active type and screen   - H/H stable  - cbc q24   - Avoid Dignshield or rectal tube     #REJI oliguric likely ATN with Vanc toxicity   - Resolved    # 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  - duplex shows DVT consistent with prior  - might need IVC filter  - not candidate for AC at this time due to alveolar hemorrhage     #Immunosuppressed: Autoimmune Hepatitis   - solumedrol 60 mg qd  - prednisone 60 mg PO qd at home now on solumedrol   - CMV PCR neg this admission   - f/u repeat tarcolimus lvl    # HTN  - holding amlodipine 10 and lopressor 50   - Monitor BPs    # heterozygous prothrombin gene mutation with hx of PE and DVT on coumadin  - holding coumadin for now due to alveolar hemorrhage   - monitor coags    #0.5 cm of GB polyp  -needs f/u US in 12 months     # Hx of asthma  - C/w montelukast qd    #Deconditioning   - pt severely deconditioned due to laying in bed and chronic steroid use   - PT/OT/Rehab once downgraded     # GI PPx: Protonix 40 mg PO qd  # DVT PPx: sequentials to right leg only  # Code Status: FULL    Pending  - follow BMP for hypokalemia and CBC for anemia  - will need PT/OT for deconditioning

## 2020-02-24 NOTE — PROGRESS NOTE ADULT - ASSESSMENT
· Assessment		  ASSESSMENT  75y/o F w/ hx of asthma, COPD not on home O2, autoimmune hepatitis on prednisone and tacrolimus, heterozygous prothrombin gene mutation with hx of PE and DVT on coumadin with recent hospitalization in January 2020 with a diagnosis of pneumonia presents for worsening SOB, hemoptysis and cough.    IMPRESSION  #MRSA VAP    2/14 tracheal cx   Moderate Methicillin resistant Staphylococcus aureus  #Candidemia Fungitell: >500 (02-11-20 @ 11:21)    2/11 BCX +, 2/12 BCX +, 2/13 BCX (-) RIJ 2/11. Groin a-line 2/4- removed 2/13     Fungitell: <31 (02-04-20 @ 04:40), Fungitell: 49 (02-01-20 @ 11:17)    Ophtho- no endophthalmitis   #Flu + AH1, Alveolar hemorrhage, ?superimposed atypical PNA (imaging not really consistent with bacterial PNA). Recent bronch 1/20 negative for PCP, Fungal, etc, previous bronch cx with yeast (likely oral contaminant) since GMS neg    2/3 Bronch   No organisms seen, 2/3 cytopath Rare atypical cells, few ciliated bronchial cells, alveolar macrophages and inflammatory cells, mainly neutrophils.    Strep urine Ag neg, serum crypto Ag neg, CMV PCR undetectable, AFB neg    Quantiferon indeterminate    Lactate Dehydrogenase, Serum: 607 (02.03.20 @ 04:30)    Procalcitonin, Serum: 1.86:    CTA chest showing no PE but showing interval development of multifocal bilateral groundglass opacities as well as new moderate to severe bronchiectasis in the right lung,   1/13 CT b/l GGOs, compatible with intersitial edema      Serum Pro-Brain Natriuretic Peptide: 722 pg/mL (01.31.20 @ 09:25)<-- Serum Pro-Brain Natriuretic Peptide: 345 pg/mL (01.13.20 @ 12:10)    During last hospitalization: bronch cx bacterial NG, +yeast, pending ID, neg legionella, + MRSA PCR    MRSA PCR Result.: Positive (02-01-20 @ 20:40)  #Severe sepsis on admission P>90, WBC 19, RR>20, lactic acidosis Lactate, Blood: 2.9 mmol/L (01-31-20 @ 09:25)    afebrile   #Elevated Alk ph, transaminitis  #LLE wound, not infection     12/7 WCX MSSA, Kleb, bacteroides    8/2019 MRSA in a wound   #Immunosuppressed: autoimmune hepatitis on prednisone and tacrolimus    RECOMMENDATIONS  - D8 of ABX for MRSA PNA.CXR with continued opacities/overall improved on L   - PO fluc 400mg daily end 2/27, monitor LFTs  - Trend WBC  - completed oseltamivir & abx s/p cefepime/levaquin  - on solumedrol

## 2020-02-24 NOTE — SWALLOW BEDSIDE ASSESSMENT ADULT - NS ASR SWALLOW FINDINGS DISCUS
DANIEL Celestin
DANIEL Kim
DANIEL Ojeda
DANIEL Barrios/Physician/Nursing/Patient
DANIEL Bautista, ICU resident
RN MD Zachery made aware./Physician/Nursing

## 2020-02-24 NOTE — CONSULT NOTE ADULT - ASSESSMENT
partial and full thickness wounds arms and legs--> no infection    rec: soap and water qd, xeroform gauze, kerlex, ace wrap dressing change bid    no surgery needed

## 2020-02-24 NOTE — SWALLOW BEDSIDE ASSESSMENT ADULT - SLP PERTINENT HISTORY OF CURRENT PROBLEM
pt admitted from [facility] (there for short-term rehab) for worsening SOB hemoptysis and cough. PMHx: asthma, COPD not on home O2, autoimmune hepatitis; recent hospitalization in January 2020 with a diagnosis of pneumonia; pt being treated for acute hypoxic resp failure with hypoxia due to influenza and HCAP, Septic shock in immunocompromised patient; pt intubated 2/1 2' respiratory distress while on bipap. s/p extubation 2/14

## 2020-02-24 NOTE — PROGRESS NOTE ADULT - ASSESSMENT
74y female with PMH of asthma, COPD not on home O2, autoimmune hepatitis on prednisone and tacrolimus, heterozygous prothrombin gene mutation with hx of PE and DVT on coumadin presents to the hospital complaining of cough, hemoptysis, SOB and desaturation to 70s. Pt was found to be flu+ likely viral PNA complicated by diffuse alveolar hemorrhage and ARDS . Hospital course complicated by DVT in L saphenous vein with possible DVT and REJI likely secondary to ATN due to vanco.      #ARDS  1-alveolar hemorrhage   2-flu +ve with post viral pneumonia resolved   3-possible PE: cant start AC due to alveolar hemorrhage    - s/p DDAVP 4 doses   - Solumedrol  - Lasix 40   - s/p cefepime and Levaquin (completed 2/21) - stopped vanc due to REJI  - completed oseltamivir 30 mg   - C/w Duoneb   - Bronch results - neg culture, neg fungal, cytology positive for inflammatory cells, no organisms  - Repeat Bronch 2/16 showed bleeding from DAH    #Candidemia   - likely A line is source , found to be foul-smelling and surrounded by pus during removal  - blood cultures grew candida  - cultures neg since 2/14  - sputum cx grew MRSA   - oral fluconazole 14 days until 2/27  - Zyvox 7 days total- stopped 2/23 for thrombocytopenia/anemia  - ID following  - trend CBC and EKG daily (QTc)     #Rectal Bleeding   - GI following   - received 1U of blood 2/23, hemoglobin stable  - Protonix switch to PO   - FlexSig showed 2 rectal ulcers one clean one crater, s/p clipping  - keep HGB above 8  - active type and screen   - H/H stable  - cbc q24   - Avoid Dignshield or rectal tube     #REJI oliguric likely ATN with Vanc toxicity   - Resolved    # 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  - duplex shows DVT consistent with prior  - might need IVC filter  - not candidate for AC at this time due to alveolar hemorrhage     #Immunosuppressed: Autoimmune Hepatitis   - solumedrol 60 mg qd  - prednisone 60 mg PO qd at home now on solumedrol   - CMV PCR neg this admission   - f/u repeat tarcolimus lvl    # HTN  - holding amlodipine 10 and lopressor 50   - Monitor BPs    # heterozygous prothrombin gene mutation with hx of PE and DVT on coumadin  - holding coumadin for now due to alveolar hemorrhage   - monitor coags    #0.5 cm of GB polyp  -needs f/u US in 12 months     # Hx of asthma  - C/w montelukast qd    #Deconditioning   - pt severely deconditioned due to laying in bed and chronic steroid use   - PT/OT/Rehab once downgraded     # GI PPx: Protonix 40 mg PO qd  # DVT PPx: sequentials to right leg only  # Activity: bedrest   # Dispo: ICU  # Code Status: FULL 74y female with PMH of asthma, COPD not on home O2, autoimmune hepatitis on prednisone and tacrolimus, heterozygous prothrombin gene mutation with hx of PE and DVT on coumadin presents to the hospital complaining of cough, hemoptysis, SOB and desaturation to 70s. Pt was found to be flu+ likely viral PNA complicated by diffuse alveolar hemorrhage and ARDS . Hospital course complicated by DVT in L saphenous vein with possible DVT and REJI likely secondary to ATN due to vanco.      #ARDS  1-alveolar hemorrhage   2-flu +ve with post viral pneumonia resolved   3-possible PE: cant start AC due to alveolar hemorrhage    - s/p DDAVP 4 doses   - Solumedrol  - Lasix 40   - s/p cefepime and Levaquin (completed 2/21) - stopped vanc due to REJI  - completed oseltamivir 30 mg   - C/w Duoneb   - Bronch results - neg culture, neg fungal, cytology positive for inflammatory cells, no organisms  - Repeat Bronch 2/16 showed bleeding from DAH    #Candidemia   - likely A line is source , found to be foul-smelling and surrounded by pus during removal  - blood cultures grew candida  - cultures neg since 2/14  - sputum cx grew MRSA   - oral fluconazole 14 days until 2/27  - Zyvox 7 days total- stopped 2/23 for thrombocytopenia/anemia  - ID following  - trend CBC and EKG daily (QTc)     #Rectal Bleeding   - GI following   - received 1U of blood 2/23, hemoglobin stable  - Protonix switch to PO   - FlexSig showed 2 rectal ulcers one clean one crater, s/p clipping  - keep HGB above 8  - active type and screen   - H/H stable  - cbc q24   - Avoid Dignshield or rectal tube     #Hypernatremia  - free water 250 ml q4  - Na+ 148 (2/24)    #REJI oliguric likely ATN with Vanc toxicity   - Resolved    # 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  - duplex shows DVT consistent with prior  - might need IVC filter  - not candidate for AC at this time due to alveolar hemorrhage     #Immunosuppressed: Autoimmune Hepatitis   - solumedrol 60 mg qd  - prednisone 60 mg PO qd at home now on solumedrol   - CMV PCR neg this admission   - f/u repeat tarcolimus lvl    # HTN  - holding amlodipine 10 and lopressor 50   - Monitor BPs    # heterozygous prothrombin gene mutation with hx of PE and DVT on coumadin  - holding coumadin for now due to alveolar hemorrhage   - monitor coags    #0.5 cm of GB polyp  -needs f/u US in 12 months     # Hx of asthma  - C/w montelukast qd    #Deconditioning   - pt severely deconditioned due to laying in bed and chronic steroid use   - PT/OT/Rehab once downgraded     # GI PPx: Protonix 40 mg PO qd  # DVT PPx: sequentials to right leg only  # Activity: bedrest   # Dispo: ICU  # Code Status: FULL

## 2020-02-25 NOTE — PROGRESS NOTE ADULT - ASSESSMENT
75 y/o female with pmhx of asthma, HTN,  autoimmune hepatitis, heterozygous prothrombin gene mutation with hx of PE and DVT on coumadin admitted for SOB     Acute hypoxic resp failure with hypoxia due to influenza, HCAP  Septic shock in immunocompromised patient, Severe sepsis present on admission   - weaned from vent to Hi flow NC to NC at 4 lpm  - SOB this am ? now improved  - remains on IV Solu-medrol  - antibiotics and antiviral completed for initial infection  - MRSA VAP treated with Zyvox for 10 days   - continue Duoneb q6h and q4h PRN    Acute Blood Loss Anemia  - had rectal tube earlier on this adm which was discontinued due to ulcer  - has a hx of hemorrhoids   - last colonoscopy about 1 year ago with Dr Washington  - on Protonix  - has had multiple PRBC's this adm  - GI f/u noted    Candedemia  - central line change noted  - cultures negative x2  - 2D echo no evidence of vegatation  - ophth evaluation noted, no clinical evidence, low suspicion for ocular fungemia/endogenous endophthalmitis   - now on Fluconazole  as per ID recommendations thru 2/27    REJI on CKD 3  - creatinine trending down  - renal f/u noted  - urine output noted  - continue to monitor  - follow renal recommendations    Autoimmune hepatitis  - On prednisone 60 mg PO qd and Tacrolimus at home  - now on Solu-medrol  - Tacrolimus held    HTN  - now off Labetolol    Heterozygous prothrombin gene mutation with hx of PE and DVT on coumadin:  - Coumadin had been held for alveolar hemorrhage     Hx of asthma  - on Montelukast qd    Multiple Skin tears/ wounds  - local care  - elevate extremities    Dysphagia  - failed swallowing evaluation today  - continue enteral feedings    Diet: Enteral feedings to be held for GI evaluation  GI PPx: Protonix  DVT PPx: sequentials  Activity: bedrest  Dispo: readmitted to hospital from  rehab at [facility], remains ICU      Continue supportive measures, patient remains acutely ill, with improving mental status. Overall prognosis poor.

## 2020-02-25 NOTE — SWALLOW FEES ASSESSMENT ADULT - DIAGNOSTIC IMPRESSIONS
Severe pharyngeal dysphagia for puree and thin liquids. Pt noted to fatigue throughout evaluation therefore further trials/consistencies not appropriate

## 2020-02-25 NOTE — SWALLOW FEES ASSESSMENT ADULT - SLP GENERAL OBSERVATIONS
Pt awake, NGT in place. 3L o2 via NC. Pt lethargic, +generalized weakness noted. Inconsistently following simple commands. +dysphonia, minimal verbal output despite max cues

## 2020-02-25 NOTE — SWALLOW FEES ASSESSMENT ADULT - COMMENTS
Pt with thick copious secretions throughout pharynx. Pt aspirating secretions with no attempt to clear despite max cues. pt with difficulty triggering cough on command to clear secretions.

## 2020-02-25 NOTE — PROGRESS NOTE ADULT - ASSESSMENT
74y female with PMH of asthma, COPD not on home O2, autoimmune hepatitis on prednisone and tacrolimus, heterozygous prothrombin gene mutation with hx of PE and DVT on coumadin presents to the hospital complaining of cough, hemoptysis. Prolonged hospital stay with following problems.    #ARDS, alveolar hemorrhage, viral pneumonia, VAC pneumonia due to MRSA   - completed Zyvox, Levaquin, Cefepime course, and oseltamivir   - C/w Duoneb   - Solumedrol IV 60mg qd  - Bronch results - neg culture, neg fungal, cytology positive for inflammatory cells, no organisms  - Repeat Bronch 2/16 showed bleeding from DAH    #Candidemia, secondary to central line  - cultures neg since 2/14  - oral fluconazole 14 days until 2/27  - ID following    #Rectal Bleeding   - GI following   - received 1U of blood 2/23, hemoglobin stable  - Protonix 40mg PO qd   - FlexSig showed 2 rectal ulcers one clean one crater, s/p clipping  - CBC q24  - Avoid Dignshield or rectal tube     #REJI oliguric likely ATN with Vanc toxicity   - Resolved    # 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  - duplex shows DVT consistent with prior  - might need IVC filter  - not candidate for AC at this time due to alveolar hemorrhage     #Immunosuppressed: Autoimmune Hepatitis   - solumedrol 60 mg qd  - prednisone 60 mg PO qd at home now on solumedrol   - CMV PCR neg this admission   - f/u repeat tarcolimus lvl    # HTN  - holding amlodipine 10 and lopressor 50   - Monitor BPs    # heterozygous prothrombin gene mutation with hx of PE and DVT on coumadin  - holding coumadin for now due to alveolar hemorrhage   - monitor coags    #0.5 cm of GB polyp  -needs f/u US in 12 months     # Hx of asthma  - C/w montelukast qd    #Deconditioning   - pt severely deconditioned due to laying in bed and chronic steroid use   - PT/OT/Rehab once downgraded     # GI PPx: Protonix 40 mg PO qd  # DVT PPx: sequentials to right leg only  # Code Status: FULL    Pending  - follow BMP for hypokalemia and CBC for anemia  - will need PT/OT for deconditioning. 74y female with PMH of asthma, COPD not on home O2, autoimmune hepatitis on prednisone and tacrolimus, heterozygous prothrombin gene mutation with hx of PE and DVT on coumadin presents to the hospital complaining of cough, hemoptysis. Prolonged hospital stay with following problems.    #ARDS, alveolar hemorrhage, viral pneumonia, VAC pneumonia due to MRSA   - completed Zyvox, Levaquin, Cefepime course, and oseltamivir   - C/w Duoneb   - Solumedrol IV 60mg qd  - Bronch results - neg culture, neg fungal, cytology positive for inflammatory cells, no organisms  - Repeat Bronch 2/16 showed bleeding from DAH    #Candidemia, secondary to central line  - cultures neg since 2/14  - oral fluconazole 14 days until 2/27  - ID following    #Rectal Bleeding   - GI following   - received 1U of blood 2/23, hemoglobin stable  - Protonix 40mg PO qd   - FlexSig showed 2 rectal ulcers one clean one crater, s/p clipping  - CBC q24  - Avoid Dignshield or rectal tube     #Hypokalemia  -Given 40 mEq twice today, f/u repeat    #REJI oliguric likely ATN with Vanc toxicity   - Resolved  - Monitor BMP    # 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  - duplex shows DVT consistent with prior  - not candidate for AC at this time due to alveolar hemorrhage   - might need IVC filter    #Immunosuppressed: Autoimmune Hepatitis   - solumedrol 60 mg qd  - holding home prednisone 60 mg PO qd   - CMV PCR neg this admission   - f/u repeat tarcolimus level    #HTN  - Continue amlodipine 10 qd, Lopressor 50 BID    #Heterozygous prothrombin gene mutation with hx of PE and DVT on coumadin  - holding coumadin for now due to alveolar hemorrhage   - monitor coags    #0.5 cm of GB polyp  - needs f/u US in 12 months     #Hx of asthma  - C/w montelukast qd    #Deconditioning   -pt severely deconditioned due to prolonged hospital stay  - PT/Rehab     # GI PPx: Protonix 40 mg PO qd  # DVT PPx: sequentials to right leg only  # Code Status: FULL

## 2020-02-25 NOTE — CONSULT NOTE ADULT - ASSESSMENT
IMPRESSION: Rehab of Debilitation /functional quad    PRECAUTIONS: [  x  ] Cardiac  [  x  ] Respiratory  [    ] Seizures [  x  ] Contact Isolation  [    ] Droplet Isolation  [    ] Other    Weight Bearing Status:     RECOMMENDATION:    Out of Bed to Chair     DVT/Decubiti Prophylaxis    REHAB PLAN:     [    x ] Bedside P/T 3-5 times a week   [     ] Bedside O/T  2-3 times a week   [     ] No Rehab Therapy Indicated   [     ]  Speech Therapy   Conditioning/ROM                                 ADL  Bed Mobility                                            Conditioning/ROM  Transfers                                                  Bed Mobility  Sitting /Standing Balance                      Transfers                                        Gait Training                                            Sitting/Standing Balance  Stair Training [   ]Applicable                 Home equipment Eval                                                                     Splinting  [   ] Only      GOALS:   ADL   [    ]   Independent         Transfers  [    ] Independent            Ambulation  [     ] Independent     [   x  ] With device                            [    ]  CG                                               [    ]  CG                                                    [     ] CG                            [   x ] Min A                                          [  x  ] Min A                                                [ x    ] Min  A          DISCHARGE PLAN:   [     ]  Good candidate for Intensive Rehabilitation/Hospital based                                             Will tolerate 3hrs Intensive Rehab Daily                                       [  x    ]  Short Term Rehab in Skilled Nursing Facility ? RETURN TO [facility]                                       [      ]  Home with Outpatient or  services                                         [      ]  Possible Candidate for Intensive Hospital based Rehab

## 2020-02-25 NOTE — CONSULT NOTE ADULT - SUBJECTIVE AND OBJECTIVE BOX
Patient is a 75y old  Female who presents with a chief complaint of SOB and desaturation (25 Feb 2020 13:45)    HPI:  73y/o F w/ hx of asthma, COPD not on home O2, autoimmune hepatitis on prednisone and tacrolimus, heterozygous prothrombin gene mutation with hx of PE and DVT on coumadin with recent hospitalization in January 2020 with a diagnosis of pneumonia presents for worsening SOB, hemoptysis and cough. Everything started yesterday night when patient was in bed, started to feel shortness of breath and had many episodes of hemoptysis with clots, she also had substernal chest pain non radiating, moderate in intensity that worsens on coughing. She denies fever but endorses chills. She also admits for lightheadedness that occurred yesterday, no HA, abd pain, dysuria or paresthesias. She had 2 loose bowel movements since yesterday with some blood in the stools that she attributes to her hemorrhoids. She stopped Coumadin couple of days ago since her INR was supratherapeutic. She is from [facility] short term and also mentions that her  and another family member have the flu and she was exposed to them. She did not have the flu shot this season.  In ED, temp was 100.1 rectally, tachycardic ( sinus) , she was saturating to 70s on non rebreather and her SaO2 went up to 95%. ABG showing respiratory alkalosis initially and then corrected after BIPAP placement and correction of RR.  CTA chest showing no PE but showing interval development of multifocal bilateral groundglass opacities as well as new moderate to severe bronchiectasis in the right lung. Findings are concerning for an acute infectious/inflammatory process. (31 Jan 2020 14:36)      PAST MEDICAL & SURGICAL HISTORY:  Prothrombin gene mutation  Autoimmune hepatitis  Other chronic pulmonary embolism without acute cor pulmonale  Essential hypertension  Autoimmune hepatitis treated with steroids  Asthma  DVT (deep venous thrombosis)  S/P debridement  History of back surgery      Hospital Course: RDS, alveolar hemorrhage, viral pneumonia, VAC pneumonia due to MRSA   - completed Zyvox, Levaquin, Cefepime course, and oseltamivir   - C/w Duoneb   - Solumedrol IV 60mg qd  - Bronch results - neg culture, neg fungal, cytology positive for inflammatory cells, no organisms  - Repeat Bronch 2/16 showed bleeding from DAH    #Candidemia, secondary to central line  - cultures neg since 2/14  - oral fluconazole 14 days until 2/27  - ID following    #Rectal Bleeding   - GI following   - received 1U of blood 2/23, hemoglobin stable  - Protonix 40mg PO qd   - FlexSig showed 2 rectal ulcers one clean one crater, s/p clipping  - CBC q24  - Avoid Dignshield or rectal tube     #Hypokalemia  -Given 40 mEq twice today, f/u repeat    #REJI oliguric likely ATN with Vanc toxicity   - Resolved  - Monitor BMP    # 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  - duplex shows DVT consistent with prior  - not candidate for AC at this time due to alveolar hemorrhage   - might need IVC filter    #Immunosuppressed: Autoimmune Hepatitis   - solumedrol 60 mg qd  - holding home prednisone 60 mg PO qd   - CMV PCR neg this admission   - f/u repeat tarcolimus level    #HTN  - Continue amlodipine 10 qd, Lopressor 50 BID    #Heterozygous prothrombin gene mutation with hx of PE and DVT on coumadin  - holding coumadin for now due to alveolar hemorrhage   - monitor coags    #0.5 cm of GB polyp  - needs f/u US in 12 months     #Hx of asthma  - C/w montelukast qd   seen by speech SP FEES- NPO    TODAY'S SUBJECTIVE & REVIEW OF SYMPTOMS:     Constitutional Weakness   Cardio WNL   Resp WNL   GI WNL  Heme WNL  Endo WNL  Skin WNL  MSK WNL  Neuro WNL  Cognitive WNL  Psych WNL      MEDICATIONS  (STANDING):  albuterol/ipratropium for Nebulization 3 milliLiter(s) Nebulizer every 6 hours  amLODIPine   Tablet 10 milliGRAM(s) Oral daily  calcitriol  Solution 0.25 MICROGram(s) Oral daily  calcium carbonate    500 mG (Tums) Chewable 3 Tablet(s) Chew every 8 hours  chlorhexidine 4% Liquid 1 Application(s) Topical <User Schedule>  cyanocobalamin 1000 MICROGram(s) Oral daily  dextrose 5%. 1000 milliLiter(s) (50 mL/Hr) IV Continuous <Continuous>  dextrose 50% Injectable 12.5 Gram(s) IV Push once  dextrose 50% Injectable 25 Gram(s) IV Push once  dextrose 50% Injectable 25 Gram(s) IV Push once  fluconAZOLE   Tablet 400 milliGRAM(s) Oral daily  gabapentin 300 milliGRAM(s) Oral at bedtime  influenza   Vaccine 0.5 milliLiter(s) IntraMuscular once  insulin glargine Injectable (LANTUS) 18 Unit(s) SubCutaneous at bedtime  insulin lispro (HumaLOG) corrective regimen sliding scale   SubCutaneous three times a day before meals  insulin lispro Injectable (HumaLOG) 6 Unit(s) SubCutaneous three times a day before meals  methylPREDNISolone sodium succinate Injectable 60 milliGRAM(s) IV Push daily  metoprolol tartrate 50 milliGRAM(s) Oral two times a day  montelukast 10 milliGRAM(s) Oral at bedtime  multivitamin/minerals 1 Tablet(s) Oral daily  mupirocin 2% Ointment 1 Application(s) Topical two times a day  pantoprazole   Suspension 40 milliGRAM(s) Oral before breakfast    MEDICATIONS  (PRN):  acetaminophen   Tablet .. 650 milliGRAM(s) Oral every 6 hours PRN Temp greater or equal to 38C (100.4F)  dextrose 40% Gel 15 Gram(s) Oral once PRN Blood Glucose LESS THAN 70 milliGRAM(s)/deciliter  glucagon  Injectable 1 milliGRAM(s) IntraMuscular once PRN Glucose LESS THAN 70 milligrams/deciliter  oxyCODONE    IR 5 milliGRAM(s) Oral every 6 hours PRN breakthrough pain      FAMILY HISTORY:  No pertinent family history in first degree relatives      Allergies    No Known Allergies    Intolerances        SOCIAL HISTORY:    [    ] Etoh  [    ] Smoking  [    ] Substance abuse     Home Environment:  [    ] Home Alone  [    ] Lives with Family  [    ] Home Health Aid    Dwelling:  [    ] Apartment  [    ] Private House  [    ] Adult Home  [  x  ] Skilled Nursing Facility      [ x   ] Short Term  [    ] Long Term  [    ] Stairs                           [    ] Elevator     FUNCTIONAL STATUS PTA: (Check all that apply)  Ambulation: [     ]Independent    [ x   ] Dependent     [    ] Non-Ambulatory  Assistive Device: [    ] SA Cane  [    ]  Q Cane  [    ] Walker  [    ]  Wheelchair  ADL : [    ] Independent  [  x  ]  Dependent       Vital Signs Last 24 Hrs  T(C): 35.4 (25 Feb 2020 12:00), Max: 36.1 (25 Feb 2020 05:30)  T(F): 95.7 (25 Feb 2020 12:00), Max: 96.9 (25 Feb 2020 05:30)  HR: 98 (25 Feb 2020 05:30) (68 - 102)  BP: 142/70 (25 Feb 2020 12:00) (123/66 - 154/67)  BP(mean): 91 (24 Feb 2020 19:00) (91 - 100)  RR: 17 (25 Feb 2020 12:00) (12 - 45)  SpO2: 94% (24 Feb 2020 22:30) (94% - 99%)      PHYSICAL EXAM: Alert & Oriented X3 on O2 + NGT  GENERAL: NAD, well-groomed, well-developed  HEAD:  Atraumatic, Normocephalic  EYES: EOMI, PERRLA, conjunctiva and sclera clear  NECK: Supple  CHEST/LUNG: Clear bilaterally  HEART: Regular rate and rhythm  ABDOMEN: Soft, Nontender, Nondistended; Bowel sounds present  EXTREMITIES:  diffuse ecchymoses/edema BUES, BLES Bandaged with ace    NERVOUS SYSTEM:  Cranial Nerves 2-12 intact [ x   ] Abnormal  [    ]  ROM: WFL all extremities [   x ]  Abnormal [     ]  Motor Strength: WFL all extremities  [    ]  Abnormal [ x   ]0/5 all ext except 1/5 toe flexion yasmeen  Sensation: intact to light touch [   x ] Abnormal [    ]      FUNCTIONAL STATUS:  Bed Mobility: [   ]  Independent [    ]  Supervision [x    ]  Needs Assistance [  ]  N/A  Transfers: [    ]  Independent [    ]  Supervision [    ]  Needs Assistance [ x   ]  N/A    Ambulation:  [    ]  Independent [    ]  Supervision [    ]  Needs Assistance [x    ]  N/A   ADL:  [    ]   Independent [   x ] Requires Assistance [    ] N/A       LABS:                        8.5    2.37  )-----------( 70       ( 25 Feb 2020 06:46 )             26.8     02-25    144  |  106  |  55<H>  ----------------------------<  258<H>  3.3<L>   |  24  |  0.8    Ca    6.9<L>      25 Feb 2020 06:46  Phos  3.3     02-24  Mg     1.8     02-25    TPro  4.4<L>  /  Alb  2.5<L>  /  TBili  1.1  /  DBili  x   /  AST  57<H>  /  ALT  60<H>  /  AlkPhos  173<H>  02-24    PT/INR - ( 24 Feb 2020 04:52 )   PT: TNP sec;   INR: TNP ratio               RADIOLOGY & ADDITIONAL STUDIES:

## 2020-02-25 NOTE — PROGRESS NOTE ADULT - SUBJECTIVE AND OBJECTIVE BOX
SUBJECTIVE:    Patient is a 75y old Female who presents with a chief complaint of SOB and desaturation (25 Feb 2020 11:59)    Currently admitted to medicine with the primary diagnosis of Pneumonia     Today is hospital day 25d. This morning she is resting comfortably in bed and reports no new issues or overnight events.     PAST MEDICAL & SURGICAL HISTORY  Prothrombin gene mutation  Autoimmune hepatitis  Other chronic pulmonary embolism without acute cor pulmonale  Essential hypertension  Autoimmune hepatitis treated with steroids  Asthma  DVT (deep venous thrombosis)  S/P debridement  History of back surgery    SOCIAL HISTORY:  Negative for smoking/alcohol/drug use.     ALLERGIES:  No Known Allergies    MEDICATIONS:  STANDING MEDICATIONS  albuterol/ipratropium for Nebulization 3 milliLiter(s) Nebulizer every 6 hours  amLODIPine   Tablet 10 milliGRAM(s) Oral daily  calcitriol  Solution 0.25 MICROGram(s) Oral daily  calcium carbonate    500 mG (Tums) Chewable 3 Tablet(s) Chew every 8 hours  chlorhexidine 4% Liquid 1 Application(s) Topical <User Schedule>  cyanocobalamin 1000 MICROGram(s) Oral daily  dextrose 5%. 1000 milliLiter(s) IV Continuous <Continuous>  dextrose 50% Injectable 12.5 Gram(s) IV Push once  dextrose 50% Injectable 25 Gram(s) IV Push once  dextrose 50% Injectable 25 Gram(s) IV Push once  fluconAZOLE   Tablet 400 milliGRAM(s) Oral daily  gabapentin 300 milliGRAM(s) Oral at bedtime  influenza   Vaccine 0.5 milliLiter(s) IntraMuscular once  insulin glargine Injectable (LANTUS) 18 Unit(s) SubCutaneous at bedtime  insulin lispro (HumaLOG) corrective regimen sliding scale   SubCutaneous three times a day before meals  insulin lispro Injectable (HumaLOG) 6 Unit(s) SubCutaneous three times a day before meals  methylPREDNISolone sodium succinate Injectable 60 milliGRAM(s) IV Push daily  metoprolol tartrate 50 milliGRAM(s) Oral two times a day  montelukast 10 milliGRAM(s) Oral at bedtime  multivitamin/minerals 1 Tablet(s) Oral daily  mupirocin 2% Ointment 1 Application(s) Topical two times a day  pantoprazole   Suspension 40 milliGRAM(s) Oral before breakfast    PRN MEDICATIONS  acetaminophen   Tablet .. 650 milliGRAM(s) Oral every 6 hours PRN  dextrose 40% Gel 15 Gram(s) Oral once PRN  glucagon  Injectable 1 milliGRAM(s) IntraMuscular once PRN  oxyCODONE    IR 5 milliGRAM(s) Oral every 6 hours PRN    VITALS:   T(F): 95.7  HR: 98  BP: 142/70  RR: 17  SpO2: 94%    LABS:                        8.5    2.37  )-----------( 70       ( 25 Feb 2020 06:46 )             26.8     02-25    144  |  106  |  55<H>  ----------------------------<  258<H>  3.3<L>   |  24  |  0.8    Ca    6.9<L>      25 Feb 2020 06:46  Phos  3.3     02-24  Mg     1.8     02-25    TPro  4.4<L>  /  Alb  2.5<L>  /  TBili  1.1  /  DBili  x   /  AST  57<H>  /  ALT  60<H>  /  AlkPhos  173<H>  02-24    PT/INR - ( 24 Feb 2020 04:52 )   PT: TNP sec;   INR: TNP ratio                       RADIOLOGY:    PHYSICAL EXAM:  GEN: Pale appearing  LUNGS: Clear to auscultation bilaterally   HEART: S1/S2 present. RRR.   ABD: Soft, non-tender, non-distended. Bowel sounds present  EXT: Bilateral upper extremities in clean dressings, lower extremities in casts  NEURO: AAOX3

## 2020-02-25 NOTE — PROGRESS NOTE ADULT - ASSESSMENT
IMPRESSION:    Acute hypoxic respiratory failure resolved  DAH resolving  Influenza A treated   Candidemia   REJI resolved   HO Autoimmune hepatitis on tacrolimus and chronic steroids   h/o hypercoagulable state/ VTE was on coumadin   Left chronic great saphenous vein thrombosis     PLAN:    CNS:  Pain control    HEENT:  Oral care.  FU with speech and swallow     PULMONARY:  HOB @ 45 degrees. Decrease Solumedrol 60 mg qd. Wean O2. Aspiration precautions.      CARDIOVASCULAR:  Keep I=O.     GI: GI prophylaxis.  NG feeding.  with aspiration precautions   free water 250 cc Q 4 hrs. Repeat S/S testing    RENAL:  Follow up lytes. Replete as needed.  FU with Renal.    INFECTIOUS DISEASE: Follow up cultures.  Continue Anti fungal. Zyvox . Follow up ID    HEMATOLOGICAL:  DVT prophylaxis--> SCD; repeat CBC    ENDOCRINE:  Follow up FS.  Insulin protocol if needed.    MUSCULOSKELETAL: Bed chair position/.  OOB to chair as tolerated     PT rehab evaluation IMPRESSION:    Acute hypoxic respiratory failure resolved  DAH resolving  Influenza A treated   Candidemia   REJI resolved   HO Autoimmune hepatitis on tacrolimus and chronic steroids   h/o hypercoagulable state/ VTE was on coumadin   Left chronic great saphenous vein thrombosis     PLAN:    CNS: Precedex for sedation    HEENT:  Oral care.  FU with speech and swallow     PULMONARY:  HOB @ 45 degrees. Decrease Solumedrol 60 mg qd. Wean O2. Aspiration precautions.      CARDIOVASCULAR:  Keep I=O.     GI: GI prophylaxis.  NG feeding.  with aspiration precautions   free water 250 cc Q 4 hrs. Repeat S/S testing    RENAL:  Follow up lytes. Replete as needed.  FU with Renal.    INFECTIOUS DISEASE: Follow up cultures.  Continue Anti fungal. Zyvox . Follow up ID    HEMATOLOGICAL:  DVT prophylaxis--> SCD; repeat CBC. repeat venous duplex LE    ENDOCRINE:  Follow up FS.  Insulin protocol if needed.    MUSCULOSKELETAL: Bed chair position/.  OOB to chair as tolerated     PT rehab evaluation

## 2020-02-25 NOTE — PROGRESS NOTE ADULT - SUBJECTIVE AND OBJECTIVE BOX
ELDER, DONI  75y  Female  HPI:  73y/o F w/ hx of asthma, COPD not on home O2, autoimmune hepatitis on prednisone and tacrolimus, heterozygous prothrombin gene mutation with hx of PE and DVT on coumadin with recent hospitalization in January 2020 with a diagnosis of pneumonia presents for worsening SOB, hemoptysis and cough. Everything started yesterday night when patient was in bed, started to feel shortness of breath and had many episodes of hemoptysis with clots, she also had substernal chest pain non radiating, moderate in intensity that worsens on coughing. She denies fever but endorses chills. She also admits for lightheadedness that occurred yesterday, no HA, abd pain, dysuria or paresthesias. She had 2 loose bowel movements since yesterday with some blood in the stools that she attributes to her hemorrhoids. She stopped Coumadin couple of days ago since her INR was supratherapeutic. She is from [facility] short term and also mentions that her  and another family member have the flu and she was exposed to them. She did not have the flu shot this season.  In ED, temp was 100.1 rectally, tachycardic ( sinus) , she was saturating to 70s on non rebreather and her SaO2 went up to 95%. ABG showing respiratory alkalosis initially and then corrected after BIPAP placement and correction of RR.  CTA chest showing no PE but showing interval development of multifocal bilateral groundglass opacities as well as new moderate to severe bronchiectasis in the right lung. Findings are concerning for an acute infectious/inflammatory process. (31 Jan 2020 14:36)    MEDICATIONS  (STANDING):  albuterol/ipratropium for Nebulization 3 milliLiter(s) Nebulizer every 6 hours  amLODIPine   Tablet 10 milliGRAM(s) Oral daily  calcitriol  Solution 0.25 MICROGram(s) Oral daily  calcium carbonate    500 mG (Tums) Chewable 3 Tablet(s) Chew every 8 hours  chlorhexidine 4% Liquid 1 Application(s) Topical <User Schedule>  cyanocobalamin 1000 MICROGram(s) Oral daily  dextrose 5%. 1000 milliLiter(s) (50 mL/Hr) IV Continuous <Continuous>  dextrose 50% Injectable 12.5 Gram(s) IV Push once  dextrose 50% Injectable 25 Gram(s) IV Push once  dextrose 50% Injectable 25 Gram(s) IV Push once  fluconAZOLE   Tablet 400 milliGRAM(s) Oral daily  gabapentin 300 milliGRAM(s) Oral at bedtime  influenza   Vaccine 0.5 milliLiter(s) IntraMuscular once  insulin glargine Injectable (LANTUS) 18 Unit(s) SubCutaneous at bedtime  insulin lispro (HumaLOG) corrective regimen sliding scale   SubCutaneous three times a day before meals  insulin lispro Injectable (HumaLOG) 6 Unit(s) SubCutaneous three times a day before meals  methylPREDNISolone sodium succinate Injectable 60 milliGRAM(s) IV Push daily  metoprolol tartrate 50 milliGRAM(s) Oral two times a day  montelukast 10 milliGRAM(s) Oral at bedtime  multivitamin/minerals 1 Tablet(s) Oral daily  mupirocin 2% Ointment 1 Application(s) Topical two times a day  pantoprazole   Suspension 40 milliGRAM(s) Oral before breakfast    MEDICATIONS  (PRN):  acetaminophen   Tablet .. 650 milliGRAM(s) Oral every 6 hours PRN Temp greater or equal to 38C (100.4F)  dextrose 40% Gel 15 Gram(s) Oral once PRN Blood Glucose LESS THAN 70 milliGRAM(s)/deciliter  glucagon  Injectable 1 milliGRAM(s) IntraMuscular once PRN Glucose LESS THAN 70 milligrams/deciliter  oxyCODONE    IR 5 milliGRAM(s) Oral every 6 hours PRN breakthrough pain    INTERVAL EVENTS: Patient seen today with some SOB, son and  at bedside. Patient failed sppech and swallowing study earlier this  am.    T(C): 36.1 (02-25-20 @ 05:30), Max: 36.1 (02-25-20 @ 05:30)  HR: 98 (02-25-20 @ 05:30) (68 - 102)  BP: 152/75 (02-25-20 @ 05:30) (111/52 - 154/67)  RR: 18 (02-24-20 @ 21:57) (10 - 45)  SpO2: 94% (02-24-20 @ 22:30) (94% - 100%)  Wt(kg): --Vital Signs Last 24 Hrs  T(C): 36.1 (25 Feb 2020 05:30), Max: 36.1 (25 Feb 2020 05:30)  T(F): 96.9 (25 Feb 2020 05:30), Max: 96.9 (25 Feb 2020 05:30)  HR: 98 (25 Feb 2020 05:30) (68 - 102)  BP: 152/75 (25 Feb 2020 05:30) (111/52 - 154/67)  BP(mean): 91 (24 Feb 2020 19:00) (73 - 100)  RR: 18 (24 Feb 2020 21:57) (10 - 45)  SpO2: 94% (24 Feb 2020 22:30) (94% - 100%)    PHYSICAL EXAM:  GENERAL: NAD, O2 NC in place, feeding tube thru left nare  NECK: Supple, No JVD, rash to neck  CHEST/LUNG: Shallow, decreased at bases  HEART: S1, S2, Regular rate and rhythm  ABDOMEN: Soft, Nontender,  Bowel sounds present  EXTREMITIES: ++ edema  SKIN: Bruising, arms wrapped    LABS:                        8.5    2.37  )-----------( 70       ( 25 Feb 2020 06:46 )             26.8             02-25    144  |  106  |  55<H>  ----------------------------<  258<H>  3.3<L>   |  24  |  0.8    Ca    6.9<L>      25 Feb 2020 06:46  Phos  3.3     02-24  Mg     1.8     02-25    TPro  4.4<L>  /  Alb  2.5<L>  /  TBili  1.1  /  DBili  x   /  AST  57<H>  /  ALT  60<H>  /  AlkPhos  173<H>  02-24    LIVER FUNCTIONS - ( 24 Feb 2020 04:52 )  Alb: 2.5 g/dL / Pro: 4.4 g/dL / ALK PHOS: 173 U/L / ALT: 60 U/L / AST: 57 U/L / GGT: x                   PT/INR - ( 24 Feb 2020 04:52 )   PT: TNP sec;   INR: TNP ratio          RADIOLOGY & ADDITIONAL TESTS:

## 2020-02-25 NOTE — PROGRESS NOTE ADULT - SUBJECTIVE AND OBJECTIVE BOX
Patient is a 75y old  Female who presents with a chief complaint of SOB and desaturation (24 Feb 2020 23:48)        SUBJECTIVE:  Resting comfortably in bed.  No SOB at rest.        REVIEW OF SYSTEMS:    CONSTITUTIONAL:   no fever   no chills.  no weight gain   no weight loss    EYES:   no discharge,   no pain    ENT:   Ears: no ear pain and no hearing problems.  Nose: no nasal congestion and no nasal drainage.    CARDIOVASCULAR:   no chest pain,   no palpitations  no syncope    RESPIRATORY:  no SOB,  no wheezing ,  no respiratory difficulty  no sputum production    GASTROINTESTINAL:   no abdominal pain,   no diarrhea,   no vomiting.    GENITOURINARY:  no dysuria,   no urgency  no hematuria.    MUSCULOSKELETAL:   no back pain,   no musculoskeletal pain,  no weakness.    SKIN:   no jaundice,    no rashes.    NEURO:   no loss of consciousness,   no headache,   Weakness.    PSYCHIATRIC:   no known mental health issues  no anxiety  no depression    ALLERGIC/IMMUNOLOGIC:   No active allergic or immunologic issues      PHYSICAL EXAM  Vital Signs Last 24 Hrs  T(C): 36.1 (25 Feb 2020 05:30), Max: 36.1 (25 Feb 2020 05:30)  T(F): 96.9 (25 Feb 2020 05:30), Max: 96.9 (25 Feb 2020 05:30)  HR: 98 (25 Feb 2020 05:30) (68 - 108)  BP: 152/75 (25 Feb 2020 05:30) (111/52 - 170/81)  BP(mean): 91 (24 Feb 2020 19:00) (73 - 123)  RR: 18 (24 Feb 2020 21:57) (10 - 45)  SpO2: 94% (24 Feb 2020 22:30) (90% - 100%)    CONSTITUTIONAL:  Well nourished.  NAD    ENT:   Airway patent,   No thrush    CARDIAC:   Normal rate,   regular rhythm.    no edema      RESPIRATORY:   NO Wheezing  Normal chest expansion  Not tachypneic,  No use of accessory muscles    GASTROINTESTINAL:  Abdomen soft,   non-tender,   no guarding,   + BS    MUSCULOSKELETAL:   range of motion is not limited,  no clubbing, cyanosis    NEUROLOGICAL:   Alert and oriented   N motor deficit. Generalized weakness     SKIN:   Skin normal color for race,   warm, dry   No evidence of rash.      02-23-20 @ 07:01  -  02-24-20 @ 07:00  --------------------------------------------------------  IN:    dextrose 10%: 150 mL    Enteral Tube Flush: 500 mL    Free Water: 250 mL    insulin regular Infusion: 156 mL    IV PiggyBack: 100 mL    Nepro with Carb Steady: 840 mL    Packed Red Blood Cells: 277 mL  Total IN: 2273 mL    OUT:    Indwelling Catheter - Urethral: 1100 mL    Voided: 1150 mL  Total OUT: 2250 mL    Total NET: 23 mL      02-24-20 @ 07:01  -  02-25-20 @ 06:58  --------------------------------------------------------  IN:    Enteral Tube Flush: 100 mL    Free Water: 750 mL    insulin regular Infusion: 19 mL    Nepro with Carb Steady: 630 mL    Osmolite: 250 mL    sodium chloride 0.9%.: 300 mL  Total IN: 2049 mL    OUT:    Indwelling Catheter - Urethral: 1700 mL    Ureteral Catheter: 324 mL  Total OUT: 2024 mL    Total NET: 25 mL          LABS:                          8.9    2.72  )-----------( 65       ( 25 Feb 2020 01:10 )             27.3                                               02-24    145  |  105  |  62<HH>  ----------------------------<  376<H>  3.3<L>   |  24  |  0.9    Ca    7.0<L>      24 Feb 2020 20:34  Phos  3.3     02-24  Mg     2.5     02-24    TPro  4.4<L>  /  Alb  2.5<L>  /  TBili  1.1  /  DBili  x   /  AST  57<H>  /  ALT  60<H>  /  AlkPhos  173<H>  02-24      PT/INR - ( 24 Feb 2020 04:52 )   PT: TNP sec;   INR: TNP ratio                                                                                              LIVER FUNCTIONS - ( 24 Feb 2020 04:52 )  Alb: 2.5 g/dL / Pro: 4.4 g/dL / ALK PHOS: 173 U/L / ALT: 60 U/L / AST: 57 U/L / GGT: x                                                                                                MEDICATIONS  (STANDING):  albuterol/ipratropium for Nebulization 3 milliLiter(s) Nebulizer every 6 hours  amLODIPine   Tablet 10 milliGRAM(s) Oral daily  calcitriol  Solution 0.25 MICROGram(s) Oral daily  calcium carbonate    500 mG (Tums) Chewable 3 Tablet(s) Chew every 8 hours  chlorhexidine 4% Liquid 1 Application(s) Topical <User Schedule>  cyanocobalamin 1000 MICROGram(s) Oral daily  dextrose 5%. 1000 milliLiter(s) (50 mL/Hr) IV Continuous <Continuous>  dextrose 50% Injectable 12.5 Gram(s) IV Push once  dextrose 50% Injectable 25 Gram(s) IV Push once  dextrose 50% Injectable 25 Gram(s) IV Push once  fluconAZOLE   Tablet 400 milliGRAM(s) Oral daily  gabapentin 300 milliGRAM(s) Oral at bedtime  influenza   Vaccine 0.5 milliLiter(s) IntraMuscular once  insulin glargine Injectable (LANTUS) 18 Unit(s) SubCutaneous at bedtime  insulin lispro (HumaLOG) corrective regimen sliding scale   SubCutaneous three times a day before meals  insulin lispro Injectable (HumaLOG) 6 Unit(s) SubCutaneous three times a day before meals  methylPREDNISolone sodium succinate Injectable 60 milliGRAM(s) IV Push daily  metoprolol tartrate 50 milliGRAM(s) Oral two times a day  montelukast 10 milliGRAM(s) Oral at bedtime  multivitamin/minerals 1 Tablet(s) Oral daily  mupirocin 2% Ointment 1 Application(s) Topical two times a day  pantoprazole  Injectable 40 milliGRAM(s) IV Push two times a day  sodium chloride 0.9%. 1000 milliLiter(s) (100 mL/Hr) IV Continuous <Continuous>    MEDICATIONS  (PRN):  acetaminophen   Tablet .. 650 milliGRAM(s) Oral every 6 hours PRN Temp greater or equal to 38C (100.4F)  dextrose 40% Gel 15 Gram(s) Oral once PRN Blood Glucose LESS THAN 70 milliGRAM(s)/deciliter  glucagon  Injectable 1 milliGRAM(s) IntraMuscular once PRN Glucose LESS THAN 70 milligrams/deciliter  oxyCODONE    IR 10 milliGRAM(s) Oral every 8 hours PRN Severe Pain (7 - 10)  oxyCODONE    IR 5 milliGRAM(s) Oral every 6 hours PRN breakthrough pain      X-Rays reviewed    CXR interpreted by me:

## 2020-02-26 NOTE — PROGRESS NOTE ADULT - SUBJECTIVE AND OBJECTIVE BOX
Addendum:  Bronchoscopy done No DAH  Duplex positive for DVT    Recommend:  Continue only anti fungal  GFF  ECHO stat.  DW ICU team

## 2020-02-26 NOTE — PROGRESS NOTE ADULT - ASSESSMENT
IMPRESSION:    Acute hypoxic respiratory failure / ARDS  DAH  Influenza A treated   Candidemia treated  REJI improving   HO Autoimmune hepatitis on tacrolimus and chronic steroids   h/o hypercoagulable state/ VTE was on coumadin   Left chronic great saphenous vein thrombosis   Failed S/S     PLAN:    CNS:  Pain control    HEENT: ET care.  Oral care.      PULMONARY:  HOB @ 45 degrees. Increase Solumedrol to 60 mg bid. Wean O2. Aspiration precautions. arterial blood gas today      CARDIOVASCULAR:  Keep I=O.     GI: GI prophylaxis.  NG feeding.      RENAL:  Follow up lytes. Replete as needed.  FU with Renal.    INFECTIOUS DISEASE: Follow up cultures.  Continue Anti fungal. Follow up ID    HEMATOLOGICAL:  DVT prophylaxis--> SCD; repeat CBC    ENDOCRINE:  Follow up FS.  Insulin protocol if needed.    MUSCULOSKELETAL: Bed rest     Code status: full     Prognosis: Poor prognosis     MICU monitoring for now    DW family at bedside IMPRESSION:    Acute hypoxic respiratory failure   SP therapy for DAH  Influenza A treated   Candidemia treated  REJI improving   HO Autoimmune hepatitis on tacrolimus and chronic steroids   h/o hypercoagulable state/ VTE was on coumadin   Left chronic great saphenous vein thrombosis       PLAN:    CNS:  Pain control    HEENT: ET care.  Oral care.      PULMONARY:  HOB @ 45 degrees.  Intubate and put on MV.  ABG post intubation and adjust vent settings     CARDIOVASCULAR:  Keep I=O.     GI: GI prophylaxis.  OG feeding.  free water     RENAL:  Follow up lytes. Replete as needed.  FU with Renal.    INFECTIOUS DISEASE: Follow up cultures.  Continue Anti fungal. Repeat pan cultures.  Bronchoscopy for BAL     HEMATOLOGICAL:  DVT prophylaxis.  FU hem onc.  Repat venous duplex STAT     ENDOCRINE:  Follow up FS.  Insulin protocol if needed.    MUSCULOSKELETAL: Bed rest     Code status: full     Prognosis: Poor prognosis     MICU monitoring for now    DW family at bedside

## 2020-02-26 NOTE — CHART NOTE - NSCHARTNOTEFT_GEN_A_CORE
Patient overnight was complaining of shortness of breath, CXR done showed no acute change.  In the AM assessed patient with Dr. Price, patient with rapid shallow breathing, complaining of shortness of breath.  Spoke with family at bedside, and agreeable for intubation and upgrade to ICU.

## 2020-02-26 NOTE — CONSULT NOTE ADULT - SUBJECTIVE AND OBJECTIVE BOX
INTERVENTIONAL RADIOLOGY CONSULT:     Procedure Requested: IVC filter placement     HPI:  73y/o F w/ hx of asthma, COPD not on home O2, autoimmune hepatitis on prednisone and tacrolimus, heterozygous prothrombin gene mutation with hx of PE and DVT on coumadin with recent hospitalization in January 2020 with a diagnosis of pneumonia presents for worsening SOB, hemoptysis and cough. Everything started yesterday night when patient was in bed, started to feel shortness of breath and had many episodes of hemoptysis with clots, she also had substernal chest pain non radiating, moderate in intensity that worsens on coughing. She denies fever but endorses chills. She also admits for lightheadedness that occurred yesterday, no HA, abd pain, dysuria or paresthesias. She had 2 loose bowel movements since yesterday with some blood in the stools that she attributes to her hemorrhoids. She stopped Coumadin couple of days ago since her INR was supratherapeutic. She is from [facility] short term and also mentions that her  and another family member have the flu and she was exposed to them. She did not have the flu shot this season.  In ED, temp was 100.1 rectally, tachycardic ( sinus) , she was saturating to 70s on non rebreather and her SaO2 went up to 95%. ABG showing respiratory alkalosis initially and then corrected after BIPAP placement and correction of RR.  CTA chest showing no PE but showing interval development of multifocal bilateral groundglass opacities as well as new moderate to severe bronchiectasis in the right lung. Findings are concerning for an acute infectious/inflammatory process. (31 Jan 2020 14:36)      PAST MEDICAL & SURGICAL HISTORY:  Prothrombin gene mutation  Autoimmune hepatitis  Other chronic pulmonary embolism without acute cor pulmonale  Essential hypertension  Autoimmune hepatitis treated with steroids  Asthma  DVT (deep venous thrombosis)  S/P debridement  History of back surgery      MEDICATIONS  (STANDING):  albuterol/ipratropium for Nebulization 3 milliLiter(s) Nebulizer every 6 hours  amLODIPine   Tablet 10 milliGRAM(s) Oral daily  calcitriol  Solution 0.25 MICROGram(s) Oral daily  calcium carbonate    500 mG (Tums) Chewable 3 Tablet(s) Chew every 8 hours  caspofungin IVPB 50 milliGRAM(s) IV Intermittent every 24 hours  chlorhexidine 4% Liquid 1 Application(s) Topical <User Schedule>  cyanocobalamin 1000 MICROGram(s) Oral daily  dexMEDEtomidine Infusion 0.2 MICROgram(s)/kG/Hr (3.75 mL/Hr) IV Continuous <Continuous>  dextrose 5%. 1000 milliLiter(s) (50 mL/Hr) IV Continuous <Continuous>  dextrose 50% Injectable 12.5 Gram(s) IV Push once  dextrose 50% Injectable 25 Gram(s) IV Push once  dextrose 50% Injectable 25 Gram(s) IV Push once  fentaNYL   Infusion. 0.5 MICROgram(s)/kG/Hr (3.75 mL/Hr) IV Continuous <Continuous>  gabapentin 300 milliGRAM(s) Oral at bedtime  influenza   Vaccine 0.5 milliLiter(s) IntraMuscular once  insulin glargine Injectable (LANTUS) 18 Unit(s) SubCutaneous at bedtime  insulin lispro (HumaLOG) corrective regimen sliding scale   SubCutaneous three times a day before meals  insulin lispro Injectable (HumaLOG) 6 Unit(s) SubCutaneous three times a day before meals  metoprolol tartrate 50 milliGRAM(s) Oral two times a day  midazolam Infusion 0.02 mG/kG/Hr (1.444 mL/Hr) IV Continuous <Continuous>  montelukast 10 milliGRAM(s) Oral at bedtime  multivitamin/minerals 1 Tablet(s) Oral daily  mupirocin 2% Ointment 1 Application(s) Topical two times a day  norepinephrine Infusion 0.05 MICROgram(s)/kG/Min (6.769 mL/Hr) IV Continuous <Continuous>  pantoprazole   Suspension 40 milliGRAM(s) Oral before breakfast    MEDICATIONS  (PRN):  acetaminophen   Tablet .. 650 milliGRAM(s) Oral every 6 hours PRN Temp greater or equal to 38C (100.4F)  dextrose 40% Gel 15 Gram(s) Oral once PRN Blood Glucose LESS THAN 70 milliGRAM(s)/deciliter  glucagon  Injectable 1 milliGRAM(s) IntraMuscular once PRN Glucose LESS THAN 70 milligrams/deciliter  oxyCODONE    IR 5 milliGRAM(s) Oral every 6 hours PRN breakthrough pain      Allergies    No Known Allergies    Intolerances    FAMILY HISTORY:  No pertinent family history in first degree relatives      Physical Exam:   Vital Signs Last 24 Hrs  T(C): 36.3 (26 Feb 2020 11:30), Max: 36.3 (26 Feb 2020 11:30)  T(F): 97.4 (26 Feb 2020 11:30), Max: 97.4 (26 Feb 2020 11:30)  HR: 94 (26 Feb 2020 13:30) (94 - 130)  BP: 106/63 (26 Feb 2020 13:30) (55/45 - 171/76)  BP(mean): 74 (26 Feb 2020 13:30) (50 - 101)  RR: 47 (26 Feb 2020 13:30) (18 - 48)  SpO2: 100% (26 Feb 2020 13:30) (65% - 100%)      Labs:                         8.7    2.09  )-----------( 55       ( 26 Feb 2020 08:39 )             25.9     02-26    146  |  105  |  47<H>  ----------------------------<  402<H>  3.8   |  26  |  0.7    Ca    7.2<L>      26 Feb 2020 08:39  Mg     1.6     02-26    TPro  4.5<L>  /  Alb  2.3<L>  /  TBili  1.0  /  DBili  x   /  AST  54<H>  /  ALT  69<H>  /  AlkPhos  227<H>  02-26        Pertinent labs:                      8.7    2.09  )-----------( 55       ( 26 Feb 2020 08:39 )             25.9       02-26    146  |  105  |  47<H>  ----------------------------<  402<H>  3.8   |  26  |  0.7    Ca    7.2<L>      26 Feb 2020 08:39  Mg     1.6     02-26    TPro  4.5<L>  /  Alb  2.3<L>  /  TBili  1.0  /  DBili  x   /  AST  54<H>  /  ALT  69<H>  /  AlkPhos  227<H>  02-26    Radiology & Additional Studies:     < from: [facility] Duplex Lower Ext Vein Scan, Bilat (02.10.20 @ 13:52) >  Impression:    No evidence of deep venous thrombosis in the bilateral lower extremities.    Left great saphenous vein superficial thrombophlebitis at the saphenofemoral junction. Unchanged from prior duplex during 2/8/2020    < end of copied text >      Radiology imaging reviewed.       ASSESSMENT/ PLAN:   73y/o F w/ hx of asthma, COPD not on home O2, autoimmune hepatitis on prednisone and tacrolimus, heterozygous prothrombin gene mutation with hx of PE and DVT on coumadin with recent hospitalization in January 2020 with a diagnosis of pneumonia presents for worsening SOB, hemoptysis and cough.  - consulted for IVC filter placement d/t inability to anticoagulate  - imaging shows left great saphenous vein superficial thrombophlebitis at femoral junction  - duplex from 2/10 confirms DVT consistent with prior  - unable to anticoagulate at this time due to alveolar hemorrhage   - plan for IVC filter placement today 2/26    Thank you for the courtesy of this consult, please call [phone] with any further questions.

## 2020-02-26 NOTE — PROGRESS NOTE ADULT - SUBJECTIVE AND OBJECTIVE BOX
PATIENT:  DONI PATRICK  [id number]    CHIEF COMPLAINT:  Patient is a 75y old  Female who presents with a chief complaint of SOB and desaturation (26 Feb 2020 10:41)      INTERVAL HISTORYOVERNIGHT EVENTS:  Desaturated and put on BIPAP.        MEDICATIONS:  MEDICATIONS  (STANDING):  albuterol/ipratropium for Nebulization 3 milliLiter(s) Nebulizer every 6 hours  amLODIPine   Tablet 10 milliGRAM(s) Oral daily  calcitriol  Solution 0.25 MICROGram(s) Oral daily  calcium carbonate    500 mG (Tums) Chewable 3 Tablet(s) Chew every 8 hours  caspofungin IVPB 50 milliGRAM(s) IV Intermittent every 24 hours  chlorhexidine 4% Liquid 1 Application(s) Topical <User Schedule>  cyanocobalamin 1000 MICROGram(s) Oral daily  dexMEDEtomidine Infusion 0.2 MICROgram(s)/kG/Hr (3.75 mL/Hr) IV Continuous <Continuous>  dextrose 5%. 1000 milliLiter(s) (50 mL/Hr) IV Continuous <Continuous>  dextrose 50% Injectable 12.5 Gram(s) IV Push once  dextrose 50% Injectable 25 Gram(s) IV Push once  dextrose 50% Injectable 25 Gram(s) IV Push once  fentaNYL   Infusion. 0.5 MICROgram(s)/kG/Hr (3.75 mL/Hr) IV Continuous <Continuous>  gabapentin 300 milliGRAM(s) Oral at bedtime  influenza   Vaccine 0.5 milliLiter(s) IntraMuscular once  insulin glargine Injectable (LANTUS) 18 Unit(s) SubCutaneous at bedtime  insulin lispro (HumaLOG) corrective regimen sliding scale   SubCutaneous three times a day before meals  insulin lispro Injectable (HumaLOG) 6 Unit(s) SubCutaneous three times a day before meals  levoFLOXacin IVPB 750 milliGRAM(s) IV Intermittent every 24 hours  metoprolol tartrate 50 milliGRAM(s) Oral two times a day  midazolam Infusion 0.02 mG/kG/Hr (1.444 mL/Hr) IV Continuous <Continuous>  midazolam Injectable 2 milliGRAM(s) IV Push once  montelukast 10 milliGRAM(s) Oral at bedtime  multivitamin/minerals 1 Tablet(s) Oral daily  mupirocin 2% Ointment 1 Application(s) Topical two times a day  norepinephrine Infusion 0.05 MICROgram(s)/kG/Min (6.769 mL/Hr) IV Continuous <Continuous>  pantoprazole   Suspension 40 milliGRAM(s) Oral before breakfast  piperacillin/tazobactam IVPB. 3.375 Gram(s) IV Intermittent once  piperacillin/tazobactam IVPB.. 3.375 Gram(s) IV Intermittent every 6 hours  vancomycin  IVPB 1000 milliGRAM(s) IV Intermittent every 12 hours    MEDICATIONS  (PRN):  acetaminophen   Tablet .. 650 milliGRAM(s) Oral every 6 hours PRN Temp greater or equal to 38C (100.4F)  dextrose 40% Gel 15 Gram(s) Oral once PRN Blood Glucose LESS THAN 70 milliGRAM(s)/deciliter  glucagon  Injectable 1 milliGRAM(s) IntraMuscular once PRN Glucose LESS THAN 70 milligrams/deciliter  oxyCODONE    IR 5 milliGRAM(s) Oral every 6 hours PRN breakthrough pain      ALLERGIES:  Allergies    No Known Allergies    Intolerances        OBJECTIVE:  ICU Vital Signs Last 24 Hrs  T(C): 36.3 (26 Feb 2020 11:30), Max: 36.3 (26 Feb 2020 11:30)  T(F): 97.4 (26 Feb 2020 11:30), Max: 97.4 (26 Feb 2020 11:30)  HR: 94 (26 Feb 2020 13:30) (94 - 130)  BP: 106/63 (26 Feb 2020 13:30) (55/45 - 171/76)  BP(mean): 74 (26 Feb 2020 13:30) (50 - 101)  ABP: --  ABP(mean): --  RR: 47 (26 Feb 2020 13:30) (18 - 48)  SpO2: 100% (26 Feb 2020 13:30) (65% - 100%)    Mode: AC/ CMV (Assist Control/ Continuous Mandatory Ventilation)  RR (machine): 16  TV (machine): 400  FiO2: 60  PEEP: 5  ITime: 1  MAP: 10  PIP: 24    Adult Advanced Hemodynamics Last 24 Hrs  CVP(mm Hg): --  CVP(cm H2O): --  CO: --  CI: --  PA: --  PA(mean): --  PCWP: --  SVR: --  SVRI: --  PVR: --  PVRI: --  CAPILLARY BLOOD GLUCOSE      POCT Blood Glucose.: 320 mg/dL (26 Feb 2020 12:38)  POCT Blood Glucose.: 332 mg/dL (26 Feb 2020 07:38)  POCT Blood Glucose.: 300 mg/dL (25 Feb 2020 21:50)  POCT Blood Glucose.: 158 mg/dL (25 Feb 2020 16:46)    CAPILLARY BLOOD GLUCOSE      POCT Blood Glucose.: 320 mg/dL (26 Feb 2020 12:38)    I&O's Summary    25 Feb 2020 07:01  -  26 Feb 2020 07:00  --------------------------------------------------------  IN: 980 mL / OUT: 600 mL / NET: 380 mL    26 Feb 2020 07:01  -  26 Feb 2020 14:14  --------------------------------------------------------  IN: 19.8 mL / OUT: 750 mL / NET: -730.2 mL      Daily Height in cm: 160.02 (26 Feb 2020 11:30)    Daily     PHYSICAL EXAMINATION:  General: WN/WD NAD  HEENT: PERRLA, EOMI, moist mucous membranes  Neurology: ARIAS x 4  Respiratory:  tachypneic, b/l breath sounds  CV: tachy, S1S2, no murmurs, rubs or gallops  Abdominal: Soft, NT  Extremities: peripheral edema, + peripheral pulses  Incisions:   Tubes: intubated    LABS:  ABG - ( 26 Feb 2020 12:35 )  pH, Arterial: 7.52  pH, Blood: x     /  pCO2: 35    /  pO2: 219   / HCO3: 28    / Base Excess: 5.3   /  SaO2: 100                                     8.7    2.09  )-----------( 55       ( 26 Feb 2020 08:39 )             25.9     02-26    146  |  105  |  47<H>  ----------------------------<  402<H>  3.8   |  26  |  0.7    Ca    7.2<L>      26 Feb 2020 08:39  Mg     1.6     02-26    TPro  4.5<L>  /  Alb  2.3<L>  /  TBili  1.0  /  DBili  x   /  AST  54<H>  /  ALT  69<H>  /  AlkPhos  227<H>  02-26    LIVER FUNCTIONS - ( 26 Feb 2020 08:39 )  Alb: 2.3 g/dL / Pro: 4.5 g/dL / ALK PHOS: 227 U/L / ALT: 69 U/L / AST: 54 U/L / GGT: x                       TELEMETRY:     EKG:     IMAGING: PATIENT:  DONI PATRICK  [id number]    CHIEF COMPLAINT:  Patient is a 75y old  Female who presents with a chief complaint of SOB and desaturation (26 Feb 2020 10:41)      INTERVAL HISTORYOVERNIGHT EVENTS:  Desaturated and put on BIPAP.        MEDICATIONS:  MEDICATIONS  (STANDING):  albuterol/ipratropium for Nebulization 3 milliLiter(s) Nebulizer every 6 hours  amLODIPine   Tablet 10 milliGRAM(s) Oral daily  calcitriol  Solution 0.25 MICROGram(s) Oral daily  calcium carbonate    500 mG (Tums) Chewable 3 Tablet(s) Chew every 8 hours  caspofungin IVPB 50 milliGRAM(s) IV Intermittent every 24 hours  chlorhexidine 4% Liquid 1 Application(s) Topical <User Schedule>  cyanocobalamin 1000 MICROGram(s) Oral daily  dexMEDEtomidine Infusion 0.2 MICROgram(s)/kG/Hr (3.75 mL/Hr) IV Continuous <Continuous>  dextrose 5%. 1000 milliLiter(s) (50 mL/Hr) IV Continuous <Continuous>  dextrose 50% Injectable 12.5 Gram(s) IV Push once  dextrose 50% Injectable 25 Gram(s) IV Push once  dextrose 50% Injectable 25 Gram(s) IV Push once  fentaNYL   Infusion. 0.5 MICROgram(s)/kG/Hr (3.75 mL/Hr) IV Continuous <Continuous>  gabapentin 300 milliGRAM(s) Oral at bedtime  influenza   Vaccine 0.5 milliLiter(s) IntraMuscular once  insulin glargine Injectable (LANTUS) 18 Unit(s) SubCutaneous at bedtime  insulin lispro (HumaLOG) corrective regimen sliding scale   SubCutaneous three times a day before meals  insulin lispro Injectable (HumaLOG) 6 Unit(s) SubCutaneous three times a day before meals  levoFLOXacin IVPB 750 milliGRAM(s) IV Intermittent every 24 hours  metoprolol tartrate 50 milliGRAM(s) Oral two times a day  midazolam Infusion 0.02 mG/kG/Hr (1.444 mL/Hr) IV Continuous <Continuous>  midazolam Injectable 2 milliGRAM(s) IV Push once  montelukast 10 milliGRAM(s) Oral at bedtime  multivitamin/minerals 1 Tablet(s) Oral daily  mupirocin 2% Ointment 1 Application(s) Topical two times a day  norepinephrine Infusion 0.05 MICROgram(s)/kG/Min (6.769 mL/Hr) IV Continuous <Continuous>  pantoprazole   Suspension 40 milliGRAM(s) Oral before breakfast  piperacillin/tazobactam IVPB. 3.375 Gram(s) IV Intermittent once  piperacillin/tazobactam IVPB.. 3.375 Gram(s) IV Intermittent every 6 hours  vancomycin  IVPB 1000 milliGRAM(s) IV Intermittent every 12 hours    MEDICATIONS  (PRN):  acetaminophen   Tablet .. 650 milliGRAM(s) Oral every 6 hours PRN Temp greater or equal to 38C (100.4F)  dextrose 40% Gel 15 Gram(s) Oral once PRN Blood Glucose LESS THAN 70 milliGRAM(s)/deciliter  glucagon  Injectable 1 milliGRAM(s) IntraMuscular once PRN Glucose LESS THAN 70 milligrams/deciliter  oxyCODONE    IR 5 milliGRAM(s) Oral every 6 hours PRN breakthrough pain      ALLERGIES:  Allergies    No Known Allergies    Intolerances        OBJECTIVE:  ICU Vital Signs Last 24 Hrs  T(C): 36.3 (26 Feb 2020 11:30), Max: 36.3 (26 Feb 2020 11:30)  T(F): 97.4 (26 Feb 2020 11:30), Max: 97.4 (26 Feb 2020 11:30)  HR: 94 (26 Feb 2020 13:30) (94 - 130)  BP: 106/63 (26 Feb 2020 13:30) (55/45 - 171/76)  BP(mean): 74 (26 Feb 2020 13:30) (50 - 101)  ABP: --  ABP(mean): --  RR: 47 (26 Feb 2020 13:30) (18 - 48)  SpO2: 100% (26 Feb 2020 13:30) (65% - 100%)    Mode: AC/ CMV (Assist Control/ Continuous Mandatory Ventilation)  RR (machine): 16  TV (machine): 400  FiO2: 60  PEEP: 5  ITime: 1  MAP: 10  PIP: 24    Adult Advanced Hemodynamics Last 24 Hrs  CVP(mm Hg): --  CVP(cm H2O): --  CO: --  CI: --  PA: --  PA(mean): --  PCWP: --  SVR: --  SVRI: --  PVR: --  PVRI: --  CAPILLARY BLOOD GLUCOSE      POCT Blood Glucose.: 320 mg/dL (26 Feb 2020 12:38)  POCT Blood Glucose.: 332 mg/dL (26 Feb 2020 07:38)  POCT Blood Glucose.: 300 mg/dL (25 Feb 2020 21:50)  POCT Blood Glucose.: 158 mg/dL (25 Feb 2020 16:46)    CAPILLARY BLOOD GLUCOSE      POCT Blood Glucose.: 320 mg/dL (26 Feb 2020 12:38)    I&O's Summary    25 Feb 2020 07:01  -  26 Feb 2020 07:00  --------------------------------------------------------  IN: 980 mL / OUT: 600 mL / NET: 380 mL    26 Feb 2020 07:01  -  26 Feb 2020 14:14  --------------------------------------------------------  IN: 19.8 mL / OUT: 750 mL / NET: -730.2 mL      Daily Height in cm: 160.02 (26 Feb 2020 11:30)    Daily     PHYSICAL EXAMINATION:  General: WN/WD NAD  HEENT: PERRLA, EOMI, moist mucous membranes  Neurology: ARIAS x 4  Respiratory:  tachypneic, b/l crackles  CV: tachy, S1S2, no murmurs, rubs or gallops  Abdominal: Soft, NT  Extremities: peripheral edema, + peripheral pulses  Incisions:   Tubes: intubated    LABS:  ABG - ( 26 Feb 2020 12:35 )  pH, Arterial: 7.52  pH, Blood: x     /  pCO2: 35    /  pO2: 219   / HCO3: 28    / Base Excess: 5.3   /  SaO2: 100                                     8.7    2.09  )-----------( 55       ( 26 Feb 2020 08:39 )             25.9     02-26    146  |  105  |  47<H>  ----------------------------<  402<H>  3.8   |  26  |  0.7    Ca    7.2<L>      26 Feb 2020 08:39  Mg     1.6     02-26    TPro  4.5<L>  /  Alb  2.3<L>  /  TBili  1.0  /  DBili  x   /  AST  54<H>  /  ALT  69<H>  /  AlkPhos  227<H>  02-26    LIVER FUNCTIONS - ( 26 Feb 2020 08:39 )  Alb: 2.3 g/dL / Pro: 4.5 g/dL / ALK PHOS: 227 U/L / ALT: 69 U/L / AST: 54 U/L / GGT: x                       TELEMETRY:     EKG:     IMAGING:

## 2020-02-26 NOTE — PROGRESS NOTE ADULT - SUBJECTIVE AND OBJECTIVE BOX
Chart reviewed, patient examined. Pertinent results reviewed.  specialist f/u reviewed  HD#[id number]  Was extubated After a prolonged course of ventilator support, but pt's respiratory status has worsened over the past 24hrs, now she is tiring and will require re-intubation this AM.  DONI ELDER  [id number]    CHIEF COMPLAINT:  Patient is a 75y old  Female who presented with a chief complaint of SOB and desaturation (2020 10:27), It was found to have viral pneumonia with respiratory failure and was intubated and received multispecialty care.      INTERVAL HISTORYOVERNIGHT EVENTS:  see above; no clear event is noted to cause the patient's repeated respiratory failure. Her CXR from yesterday was stable.  48hrs ago, she did appear much improved.  Her  and son are at her bedside.        MEDICATIONS:  MEDICATIONS  (STANDING):  albuterol/ipratropium for Nebulization 3 milliLiter(s) Nebulizer every 6 hours  amLODIPine   Tablet 10 milliGRAM(s) Oral daily  calcitriol  Solution 0.25 MICROGram(s) Oral daily  calcium carbonate    500 mG (Tums) Chewable 3 Tablet(s) Chew every 8 hours  chlorhexidine 4% Liquid 1 Application(s) Topical <User Schedule>  cyanocobalamin 1000 MICROGram(s) Oral daily  dexMEDEtomidine Infusion 0.2 MICROgram(s)/kG/Hr (3.75 mL/Hr) IV Continuous <Continuous>  dextrose 5%. 1000 milliLiter(s) (50 mL/Hr) IV Continuous <Continuous>  dextrose 50% Injectable 12.5 Gram(s) IV Push once  dextrose 50% Injectable 25 Gram(s) IV Push once  dextrose 50% Injectable 25 Gram(s) IV Push once  fluconAZOLE   Tablet 400 milliGRAM(s) Oral daily  gabapentin 300 milliGRAM(s) Oral at bedtime  influenza   Vaccine 0.5 milliLiter(s) IntraMuscular once  insulin glargine Injectable (LANTUS) 18 Unit(s) SubCutaneous at bedtime  insulin lispro (HumaLOG) corrective regimen sliding scale   SubCutaneous three times a day before meals  insulin lispro Injectable (HumaLOG) 6 Unit(s) SubCutaneous three times a day before meals  LORazepam   Injectable 2 milliGRAM(s) IV Push once  methylPREDNISolone sodium succinate IVPB 125 milliGRAM(s) IV Intermittent every 6 hours  metoprolol tartrate 50 milliGRAM(s) Oral two times a day  montelukast 10 milliGRAM(s) Oral at bedtime  multivitamin/minerals 1 Tablet(s) Oral daily  mupirocin 2% Ointment 1 Application(s) Topical two times a day  pantoprazole   Suspension 40 milliGRAM(s) Oral before breakfast    MEDICATIONS  (PRN):  acetaminophen   Tablet .. 650 milliGRAM(s) Oral every 6 hours PRN Temp greater or equal to 38C (100.4F)  dextrose 40% Gel 15 Gram(s) Oral once PRN Blood Glucose LESS THAN 70 milliGRAM(s)/deciliter  glucagon  Injectable 1 milliGRAM(s) IntraMuscular once PRN Glucose LESS THAN 70 milligrams/deciliter  oxyCODONE    IR 5 milliGRAM(s) Oral every 6 hours PRN breakthrough pain    ALLERGIES:  Allergies    No Known Allergies    Intolerances      Vital Signs Last 24 Hrs  T(C): 36.2 (2020 22:03), Max: 36.2 (2020 22:03)  T(F): 97.2 (2020 22:03), Max: 97.2 (2020 22:03)  HR: 104 (2020 05:30) (95 - 122)  BP: 141/76 (2020 05:28) (141/76 - 171/76)  BP(mean): --  RR: 18 (2020 22:03) (17 - 18)  SpO2: 98% (2020 01:24) (95% - 98%)    POCT Blood Glucose.: 252 mg/dL (2020 10:09)    I&O's Summary    2020 07:01  -  2020 07:00  --------------------------------------------------------  IN: 2273 mL / OUT: 2250 mL / NET: 23 mL      Daily     Daily Weight in k.5 (2020 03:00)    PHYSICAL EXAMINATION:  General: WN/WD on BIPAP, with + respiratory distress- RR36  HEENT: PERRLA, EOMI, min moist mucous membranes  Neurology: A&Ox3, nonfocal, ARIAS x 4  Respiratory: b/l breath sounds clear on L; mod wheezing on her R  CV: irregular, S1S2, no murmurs, rubs or gallops  Abdominal: Soft, NT, ND +BS, Last BM  Extremities: + peripheral pulses, edema on limbs With multiple ecchymoses and few avulsed areas  Incisions:   Tubes: NG feeding tube    LABS:                          8.7    2.09  )-----------( 55       ( 2020 08:39 )             25.9                         9.6    4.35  )-----------( 82       ( 2020 04:52 )             28.6   02-    146  |  105  |  47<H>  ----------------------------<  402<H>  3.8   |  26  |  0.7    Ca    7.2<L>      2020 08:39  Mg     1.6         TPro  4.5<L>  /  Alb  2.3<L>  /  TBili  1.0  /  DBili  x   /  AST  54<H>  /  ALT  69<H>  /  AlkPhos  227<H>      148<H>  |  108  |  70<HH>  ----------------------------<  106<H>  3.3<L>   |  24  |  1.0    Ca    7.4<L>      2020 04:52  Phos  3.3     -24  Mg     2.5         TPro  4.4<L>  /  Alb  2.5<L>  /  TBili  1.1  /  DBili  x   /  AST  57<H>  /  ALT  60<H>  /  AlkPhos  173<H>  24    LIVER FUNCTIONS - ( 2020 04:52 )  Alb: 2.5 g/dL / Pro: 4.4 g/dL / ALK PHOS: 173 U/L / ALT: 60 U/L / AST: 57 U/L / GGT: x           PT/INR - ( 2020 04:52 )   PT: TNP sec;   INR: TNP ratio               < from: Xray Chest 1 View-PORTABLE IMMEDIATE (20 @ 04:12) >    EXAM:  XR CHEST PORTABLE IMMED 1V            PROCEDURE DATE:  2020            INTERPRETATION:  CLINICAL HISTORY: Shortness of Breath.    COMPARISON: 2020.    TECHNIQUE: Portable frontal chest radiograph. Adequate positioning.    FINDINGS:    Support devices: Stable enteric tube.    Cardiac/mediastinum/hilum: Stable enlarged cardiac silhouette.    Lung parenchyma/Pleura: Stable bilateral opacities with pleural and diaphragmatic calcified plaques. No pneumothorax.    Skeleton/soft tissues: Stable.    IMPRESSION:      No interval change since one day earlier.    < end of copied text >

## 2020-02-26 NOTE — PROGRESS NOTE ADULT - SUBJECTIVE AND OBJECTIVE BOX
Patient is a 75y old  Female who presents with a chief complaint of SOB and desaturation (25 Feb 2020 16:43)        Over Night Events:    Desaturation and respiratory distress. placed on BIPAP    ROS:     CONSTITUTIONAL:   no fever   no chills.  no weight gain   no weight loss    EYES:   no discharge,   no pain  no redness,   no visual changes.    ENT:   Ears: no ear pain and no hearing problems.  Nose: no nasal congestion and no nasal drainage.  Mouth/Throat: no dysphagia,  no hoarseness and no throat pain.  Neck: no lumps, no pain, no stiffness and no swollen glands.     CARDIOVASCULAR:   no chest pain,   no swelling  no palpitations  no syncope    RESPIRATORY:  see HPI    GASTROINTESTINAL:   no abdominal pain,   no constipation,   + diarrhea,   no vomiting.    GENITOURINARY:  + dysuria,   no frequency,   no urgency  no hematuria.    MUSCULOSKELETAL:   no back pain,   no musculoskeletal pain,  + weakness.    SKIN:   no jaundice,   no lesions,   no pruritis,   no rashes.    NEURO:   no loss of consciousness,   no gait abnormality,   no headache,   no sensory deficits,   no weakness.    PSYCHIATRIC:   no known mental health issues  no anxiety  no depression    ALLERGIC/IMMUNOLOGIC:   No active allergic or immunologic issues        PHYSICAL EXAM    ICU Vital Signs Last 24 Hrs  T(C): 36.2 (25 Feb 2020 22:03), Max: 36.2 (25 Feb 2020 22:03)  T(F): 97.2 (25 Feb 2020 22:03), Max: 97.2 (25 Feb 2020 22:03)  HR: 104 (26 Feb 2020 05:30) (95 - 122)  BP: 141/76 (26 Feb 2020 05:28) (141/76 - 171/76)  BP(mean): --  ABP: --  ABP(mean): --  RR: 18 (25 Feb 2020 22:03) (17 - 18)  SpO2: 98% (26 Feb 2020 01:24) (95% - 98%)      CONSTITUTIONAL:   Ill appearing.  Well nourished.  NAD    ENT:   Airway patent,   Mouth with normal mucosa.   No thrush    CARDIAC:   Normal rate,   regular rhythm.     No edema    RESPIRATORY:   NO wheezing  Bilateral crackles  + tachypneic,  + use of accessory muscles    GASTROINTESTINAL:  Abdomen soft,   Non-tender,   No guarding,   + BS    MUSCULOSKELETAL:   Range of motion is not limited,  No clubbing, cyanosis    NEUROLOGICAL:   Alert and oriented x2  No motor  deficits.  pertinent DTRs normal    SKIN:   Skin normal color for race,   warm, dry   No evidence of rash.        02-25-20 @ 07:01  -  02-26-20 @ 07:00  --------------------------------------------------------  IN:    Free Water: 500 mL    Osmolite: 480 mL  Total IN: 980 mL    OUT:    Indwelling Catheter - Urethral: 600 mL  Total OUT: 600 mL    Total NET: 380 mL      02-26-20 @ 07:01  -  02-26-20 @ 09:50  --------------------------------------------------------  IN:  Total IN: 0 mL    OUT:    Voided: 450 mL  Total OUT: 450 mL    Total NET: -450 mL          LABS:                            8.7    2.09  )-----------( 55       ( 26 Feb 2020 08:39 )             25.9                                               02-26    146  |  105  |  47<H>  ----------------------------<  402<H>  3.8   |  26  |  0.7    Ca    7.2<L>      26 Feb 2020 08:39  Mg     1.6     02-26    TPro  4.5<L>  /  Alb  2.3<L>  /  TBili  1.0  /  DBili  x   /  AST  54<H>  /  ALT  69<H>  /  AlkPhos  227<H>  02-26                                                                                           LIVER FUNCTIONS - ( 26 Feb 2020 08:39 )  Alb: 2.3 g/dL / Pro: 4.5 g/dL / ALK PHOS: 227 U/L / ALT: 69 U/L / AST: 54 U/L / GGT: x                                                                                                                                       MEDICATIONS  (STANDING):  albuterol/ipratropium for Nebulization 3 milliLiter(s) Nebulizer every 6 hours  amLODIPine   Tablet 10 milliGRAM(s) Oral daily  calcitriol  Solution 0.25 MICROGram(s) Oral daily  calcium carbonate    500 mG (Tums) Chewable 3 Tablet(s) Chew every 8 hours  chlorhexidine 4% Liquid 1 Application(s) Topical <User Schedule>  cyanocobalamin 1000 MICROGram(s) Oral daily  dextrose 5%. 1000 milliLiter(s) (50 mL/Hr) IV Continuous <Continuous>  dextrose 50% Injectable 12.5 Gram(s) IV Push once  dextrose 50% Injectable 25 Gram(s) IV Push once  dextrose 50% Injectable 25 Gram(s) IV Push once  fluconAZOLE   Tablet 400 milliGRAM(s) Oral daily  gabapentin 300 milliGRAM(s) Oral at bedtime  influenza   Vaccine 0.5 milliLiter(s) IntraMuscular once  insulin glargine Injectable (LANTUS) 18 Unit(s) SubCutaneous at bedtime  insulin lispro (HumaLOG) corrective regimen sliding scale   SubCutaneous three times a day before meals  insulin lispro Injectable (HumaLOG) 6 Unit(s) SubCutaneous three times a day before meals  methylPREDNISolone sodium succinate Injectable 60 milliGRAM(s) IV Push daily  metoprolol tartrate 50 milliGRAM(s) Oral two times a day  montelukast 10 milliGRAM(s) Oral at bedtime  multivitamin/minerals 1 Tablet(s) Oral daily  mupirocin 2% Ointment 1 Application(s) Topical two times a day  pantoprazole   Suspension 40 milliGRAM(s) Oral before breakfast    MEDICATIONS  (PRN):  acetaminophen   Tablet .. 650 milliGRAM(s) Oral every 6 hours PRN Temp greater or equal to 38C (100.4F)  dextrose 40% Gel 15 Gram(s) Oral once PRN Blood Glucose LESS THAN 70 milliGRAM(s)/deciliter  glucagon  Injectable 1 milliGRAM(s) IntraMuscular once PRN Glucose LESS THAN 70 milligrams/deciliter  oxyCODONE    IR 5 milliGRAM(s) Oral every 6 hours PRN breakthrough pain Patient is a 75y old  Female who presents with a chief complaint of SOB and desaturation (25 Feb 2020 16:43)        Over Night Events:  Developed desaturation and respiratory distress. Was placed on BIPAP.      ROS:     CONSTITUTIONAL:   no fever   no chills.  no weight gain   no weight loss    EYES:   no discharge,   no pain  no redness,   no visual changes.    ENT:   Ears: no ear pain and no hearing problems.  Nose: no nasal congestion and no nasal drainage.  Mouth/Throat: no dysphagia,  no hoarseness and no throat pain.  Neck: no lumps, no pain, no stiffness and no swollen glands.     CARDIOVASCULAR:   no chest pain,   no swelling  no palpitations  no syncope    RESPIRATORY:  see HPI    GASTROINTESTINAL:   no abdominal pain,   no constipation,   + diarrhea,   no vomiting.    GENITOURINARY:  + dysuria,   no frequency,   no urgency  no hematuria.    MUSCULOSKELETAL:   no back pain,   no musculoskeletal pain,  + weakness.    SKIN:   no jaundice,   no lesions,   no pruritis,   no rashes.    NEURO:   no loss of consciousness,   no gait abnormality,   no headache,   no sensory deficits,   no weakness.    PSYCHIATRIC:   no known mental health issues  no anxiety  no depression    ALLERGIC/IMMUNOLOGIC:   No active allergic or immunologic issues        PHYSICAL EXAM    ICU Vital Signs Last 24 Hrs  T(C): 36.2 (25 Feb 2020 22:03), Max: 36.2 (25 Feb 2020 22:03)  T(F): 97.2 (25 Feb 2020 22:03), Max: 97.2 (25 Feb 2020 22:03)  HR: 104 (26 Feb 2020 05:30) (95 - 122)  BP: 141/76 (26 Feb 2020 05:28) (141/76 - 171/76)  BP(mean): --  ABP: --  ABP(mean): --  RR: 18 (25 Feb 2020 22:03) (17 - 18)  SpO2: 98% (26 Feb 2020 01:24) (95% - 98%)      CONSTITUTIONAL:   Ill appearing.  Well nourished.  ij respiratory distress     ENT:   Airway patent,   Mouth with normal mucosa.   No thrush    CARDIAC:   Tachycardic   regular rhythm.     No edema    RESPIRATORY:   NO wheezing  Bilateral crackles  + tachypneic,  + use of accessory muscles    GASTROINTESTINAL:  Abdomen soft,   Non-tender,   No guarding,   + BS    MUSCULOSKELETAL:   Range of motion is not limited,  No clubbing, cyanosis    NEUROLOGICAL:   Alert and oriented x2  No motor  deficits.  Generalized weakness     SKIN:   Skin normal color for race,   warm, dry   No evidence of rash.        02-25-20 @ 07:01  -  02-26-20 @ 07:00  --------------------------------------------------------  IN:    Free Water: 500 mL    Osmolite: 480 mL  Total IN: 980 mL    OUT:    Indwelling Catheter - Urethral: 600 mL  Total OUT: 600 mL    Total NET: 380 mL      02-26-20 @ 07:01  -  02-26-20 @ 09:50  --------------------------------------------------------  IN:  Total IN: 0 mL    OUT:    Voided: 450 mL  Total OUT: 450 mL    Total NET: -450 mL          LABS:                            8.7    2.09  )-----------( 55       ( 26 Feb 2020 08:39 )             25.9                                               02-26    146  |  105  |  47<H>  ----------------------------<  402<H>  3.8   |  26  |  0.7    Ca    7.2<L>      26 Feb 2020 08:39  Mg     1.6     02-26    TPro  4.5<L>  /  Alb  2.3<L>  /  TBili  1.0  /  DBili  x   /  AST  54<H>  /  ALT  69<H>  /  AlkPhos  227<H>  02-26                                                                                           LIVER FUNCTIONS - ( 26 Feb 2020 08:39 )  Alb: 2.3 g/dL / Pro: 4.5 g/dL / ALK PHOS: 227 U/L / ALT: 69 U/L / AST: 54 U/L / GGT: x                                                                                                                                       MEDICATIONS  (STANDING):  albuterol/ipratropium for Nebulization 3 milliLiter(s) Nebulizer every 6 hours  amLODIPine   Tablet 10 milliGRAM(s) Oral daily  calcitriol  Solution 0.25 MICROGram(s) Oral daily  calcium carbonate    500 mG (Tums) Chewable 3 Tablet(s) Chew every 8 hours  chlorhexidine 4% Liquid 1 Application(s) Topical <User Schedule>  cyanocobalamin 1000 MICROGram(s) Oral daily  dextrose 5%. 1000 milliLiter(s) (50 mL/Hr) IV Continuous <Continuous>  dextrose 50% Injectable 12.5 Gram(s) IV Push once  dextrose 50% Injectable 25 Gram(s) IV Push once  dextrose 50% Injectable 25 Gram(s) IV Push once  fluconAZOLE   Tablet 400 milliGRAM(s) Oral daily  gabapentin 300 milliGRAM(s) Oral at bedtime  influenza   Vaccine 0.5 milliLiter(s) IntraMuscular once  insulin glargine Injectable (LANTUS) 18 Unit(s) SubCutaneous at bedtime  insulin lispro (HumaLOG) corrective regimen sliding scale   SubCutaneous three times a day before meals  insulin lispro Injectable (HumaLOG) 6 Unit(s) SubCutaneous three times a day before meals  methylPREDNISolone sodium succinate Injectable 60 milliGRAM(s) IV Push daily  metoprolol tartrate 50 milliGRAM(s) Oral two times a day  montelukast 10 milliGRAM(s) Oral at bedtime  multivitamin/minerals 1 Tablet(s) Oral daily  mupirocin 2% Ointment 1 Application(s) Topical two times a day  pantoprazole   Suspension 40 milliGRAM(s) Oral before breakfast    MEDICATIONS  (PRN):  acetaminophen   Tablet .. 650 milliGRAM(s) Oral every 6 hours PRN Temp greater or equal to 38C (100.4F)  dextrose 40% Gel 15 Gram(s) Oral once PRN Blood Glucose LESS THAN 70 milliGRAM(s)/deciliter  glucagon  Injectable 1 milliGRAM(s) IntraMuscular once PRN Glucose LESS THAN 70 milligrams/deciliter  oxyCODONE    IR 5 milliGRAM(s) Oral every 6 hours PRN breakthrough pain

## 2020-02-26 NOTE — PROGRESS NOTE ADULT - ASSESSMENT
74y female with PMH of asthma, COPD not on home O2, autoimmune hepatitis on prednisone and tacrolimus, heterozygous prothrombin gene mutation with hx of PE and DVT on coumadin presents to the hospital complaining of cough, hemoptysis. Found to have viral pneumonia with respiratory failure and intubated. Extubated after prolonged course but required re-intubation 2/26 for worsening respiratory status.    #ARDS, alveolar hemorrhage, viral pneumonia, VAC pneumonia due to MRSA   - completed Zyvox, Levaquin, Cefepime course, and oseltamivir   - C/w Duoneb   - Solumedrol IV 60mg qd  - Bronch results - neg culture, neg fungal, cytology positive for inflammatory cells, no organisms  - Bronch 2/16 showed bleeding from DAH  - Bronch 2/26 for BAL    #Candidemia, secondary to central line  - cultures neg since 2/14  - oral fluconazole 14 days, switched to caspofungin for rising LFT (2/26)  - ID following    #Rectal Bleeding   - GI following   - received 1U of blood 2/23, hemoglobin stable  - Protonix 40mg PO qd   - FlexSig showed 2 rectal ulcers one clean one crater, s/p clipping  - CBC q24  - Avoid Dignshield or rectal tube     #Hypokalemia  -Given 40 mEq twice today, f/u repeat    #REJI oliguric likely ATN with Vanc toxicity   - Resolved  - Monitor BMP    # 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  - duplex shows DVT consistent with prior  - not candidate for AC at this time due to alveolar hemorrhage   - might need IVC filter    #Immunosuppressed: Autoimmune Hepatitis   - solumedrol 60 mg qd  - holding home prednisone 60 mg PO qd   - CMV PCR neg this admission   - f/u repeat tarcolimus level    #HTN  - Continue amlodipine 10 qd, Lopressor 50 BID    #Heterozygous prothrombin gene mutation with hx of PE and DVT on coumadin  - holding coumadin for now due to alveolar hemorrhage   - monitor coags    #0.5 cm of GB polyp  - needs f/u US in 12 months     #Hx of asthma  - C/w montelukast qd    #Deconditioning   -pt severely deconditioned due to prolonged hospital stay  - PT/Rehab     # GI PPx: Protonix 40 mg PO qd  # DVT PPx: sequentials to right leg only  # Code Status: FULL 74y female with PMH of asthma, COPD not on home O2, autoimmune hepatitis on prednisone and tacrolimus, heterozygous prothrombin gene mutation with hx of PE and DVT on coumadin presents to the hospital complaining of cough, hemoptysis. Found to have viral pneumonia with respiratory failure and intubated. Extubated after prolonged course but required re-intubation 2/26 for worsening respiratory status and received IVC filter 2/26 due to not being able to AC (alveolar hemorrhage).     #ARDS, alveolar hemorrhage, viral pneumonia, VAC pneumonia due to MRSA   - completed Zyvox, Levaquin, Cefepime course, and oseltamivir   - C/w Duoneb   - Solumedrol IV 60mg qd  - Bronch results - neg culture, neg fungal, cytology positive for inflammatory cells, no organisms  - Bronch 2/16 showed bleeding from DAH  - Bronch 2/26 for BAL    #Candidemia, secondary to central line  - cultures neg since 2/14  - oral fluconazole 14 days, switched to caspofungin for rising LFT (2/26)  - ID following    #Rectal Bleeding   - GI following   - received 1U of blood 2/23, hemoglobin stable  - Protonix 40mg PO qd   - FlexSig showed 2 rectal ulcers one clean one crater, s/p clipping  - CBC q24  - Avoid Dignshield or rectal tube     #Hypokalemia  -Given 40 mEq twice today, f/u repeat    #REJI oliguric likely ATN with Vanc toxicity   - Resolved  - Monitor BMP    # 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  - duplex shows DVT consistent with prior  - not candidate for AC at this time due to alveolar hemorrhage   - IVC filter placed 2/26    #Immunosuppressed: Autoimmune Hepatitis   - solumedrol 60 mg qd  - holding home prednisone 60 mg PO qd   - CMV PCR neg this admission   - f/u repeat tarcolimus level    #HTN  - Continue amlodipine 10 qd, Lopressor 50 BID    #Heterozygous prothrombin gene mutation with hx of PE and DVT on coumadin  - holding coumadin for now due to alveolar hemorrhage   - monitor coags    #0.5 cm of GB polyp  - needs f/u US in 12 months     #Hx of asthma  - C/w montelukast qd    #Deconditioning   -pt severely deconditioned due to prolonged hospital stay  - PT/Rehab     # GI PPx: Protonix 40 mg PO qd  # DVT PPx: sequentials to right leg only  # Code Status: FULL

## 2020-02-26 NOTE — PROGRESS NOTE ADULT - ASSESSMENT
ASSESSMENT  75y/o F w/ hx of asthma, COPD not on home O2, autoimmune hepatitis on prednisone and tacrolimus, heterozygous prothrombin gene mutation with hx of PE and DVT on coumadin with recent hospitalization in January 2020 with a diagnosis of pneumonia presents for worsening SOB, hemoptysis and cough.    IMPRESSION  #Acute hypoxemia, now intubated 2/26 AM    CXR somewhat improved since 2/24, stable  #MRSA VAP    2/14 tracheal cx   Moderate Methicillin resistant Staphylococcus aureus    completed 8 day course, noted to have thrombocytopenia on linezolid  #Candidemia Fungitell: >500 (02-11-20 @ 11:21) likely CLABSI    2/11 BCX +, 2/12 BCX +, 2/13 BCX (-) RIJ 2/11. Groin a-line 2/4- removed 2/13     Fungitell: <31 (02-04-20 @ 04:40), Fungitell: 49 (02-01-20 @ 11:17)    Ophtho- no endophthalmitis   #Flu + AH1, Alveolar hemorrhage, ?superimposed atypical PNA (imaging not really consistent with bacterial PNA). Recent bronch 1/20 negative for PCP, Fungal, etc, previous bronch cx with yeast (likely oral contaminant) since GMS neg    2/3 Bronch   No organisms seen, 2/3 cytopath Rare atypical cells, few ciliated bronchial cells, alveolar macrophages and inflammatory cells, mainly neutrophils.    Strep urine Ag neg, serum crypto Ag neg, CMV PCR undetectable, AFB neg    Quantiferon indeterminate    Lactate Dehydrogenase, Serum: 607 (02.03.20 @ 04:30)    Procalcitonin, Serum: 1.86:    CTA chest showing no PE but showing interval development of multifocal bilateral groundglass opacities as well as new moderate to severe bronchiectasis in the right lung,   1/13 CT b/l GGOs, compatible with intersitial edema      Serum Pro-Brain Natriuretic Peptide: 722 pg/mL (01.31.20 @ 09:25)<-- Serum Pro-Brain Natriuretic Peptide: 345 pg/mL (01.13.20 @ 12:10)    During last hospitalization: bronch cx bacterial NG, +yeast, no ID, neg legionella, + MRSA PCR    MRSA PCR Result.: Positive (02-01-20 @ 20:40)  #Severe sepsis on admission P>90, WBC 19, RR>20, lactic acidosis Lactate, Blood: 2.9 mmol/L (01-31-20 @ 09:25)    afebrile   #Elevated Alk ph, transaminitis  #LLE wound, not infection     12/7 WCX MSSA, Kleb, bacteroides    8/2019 MRSA in a wound   #Immunosuppressed: autoimmune hepatitis on prednisone and tacrolimus    RECOMMENDATIONS  - can change to caspo 50mg daily to complete 14 days for candidemia end 2/27 given elevated LFTs from fluc  - bronch per ICU  - no fevers, WBC stable, pancytopenia     Spectra [id number]

## 2020-02-26 NOTE — PROGRESS NOTE ADULT - SUBJECTIVE AND OBJECTIVE BOX
DONI PATRICK  75y, Female  Allergy: No Known Allergies      LOS  26d    CHIEF COMPLAINT: SOB and desaturation (26 Feb 2020 09:50)      INTERVAL EVENTS/HPI  - intubated for resp failure this AM   - T(F): , Max: 97.2 (02-25-20 @ 22:03)  - WBC Count: 2.09 (02-26-20 @ 08:39)  WBC Count: 2.37 (02-25-20 @ 06:46)  - Creatinine, Serum: 0.7 (02-26-20 @ 08:39)  Creatinine, Serum: 0.8 (02-25-20 @ 06:46)       ROS  unable to obtain history secondary to patient's mental status and/or sedation     VITALS:  T(F): 97.2, Max: 97.2 (02-25-20 @ 22:03)  HR: 104  BP: 141/76  RR: 18Vital Signs Last 24 Hrs  T(C): 36.2 (25 Feb 2020 22:03), Max: 36.2 (25 Feb 2020 22:03)  T(F): 97.2 (25 Feb 2020 22:03), Max: 97.2 (25 Feb 2020 22:03)  HR: 104 (26 Feb 2020 05:30) (95 - 122)  BP: 141/76 (26 Feb 2020 05:28) (141/76 - 171/76)  BP(mean): --  RR: 18 (25 Feb 2020 22:03) (17 - 18)  SpO2: 98% (26 Feb 2020 01:24) (95% - 98%)    PHYSICAL EXAM:  Gen: intubated  HEENT: Normocephalic, atraumatic  Neck: supple, no lymphadenopathy  CV: Regular rate & regular rhythm  Lungs: decreased BS at bases, no fremitus  Abdomen: Soft, BS present  Ext: Warm, well perfused anasarca, multiple ecchymoses, LLE ulcer healing   Neuro: sedated  Skin: no rash, no erythema  Lines: no phlebitis    FH: Non-contributory  Social Hx: Non-contributory    TESTS & MEASUREMENTS:                        8.7    2.09  )-----------( 55       ( 26 Feb 2020 08:39 )             25.9     02-26    146  |  105  |  47<H>  ----------------------------<  402<H>  3.8   |  26  |  0.7    Ca    7.2<L>      26 Feb 2020 08:39  Mg     1.6     02-26    TPro  4.5<L>  /  Alb  2.3<L>  /  TBili  1.0  /  DBili  x   /  AST  54<H>  /  ALT  69<H>  /  AlkPhos  227<H>  02-26    eGFR if Non African American: 85 mL/min/1.73M2 (02-26-20 @ 08:39)  eGFR if : 98 mL/min/1.73M2 (02-26-20 @ 08:39)    LIVER FUNCTIONS - ( 26 Feb 2020 08:39 )  Alb: 2.3 g/dL / Pro: 4.5 g/dL / ALK PHOS: 227 U/L / ALT: 69 U/L / AST: 54 U/L / GGT: x               Culture - Blood (collected 02-17-20 @ 05:17)  Source: .Blood None  Final Report (02-22-20 @ 14:00):    No growth at 5 days.    Culture - Blood (collected 02-15-20 @ 04:57)  Source: .Blood None  Final Report (02-20-20 @ 18:01):    No growth at 5 days.    Culture - Sputum (collected 02-14-20 @ 17:00)  Source: .Sputum Sputum  Gram Stain (02-15-20 @ 04:56):    Few Squamous epithelial cells per low power field    Few polymorphonuclear leukocytes per low power field    Few Yeast like cells per oil power field    Few Gram variable coccobacilli per oil power field  Final Report (02-16-20 @ 17:44):    Moderate Methicillin resistant Staphylococcus aureus    Normal Respiratory Nikki present  Organism: Methicillin resistant Staphylococcus aureus (02-16-20 @ 17:44)  Organism: Methicillin resistant Staphylococcus aureus (02-16-20 @ 17:44)      -  Ampicillin/Sulbactam: R <=8/4      -  Cefazolin: R <=4      -  Clindamycin: S <=0.25      -  Erythromycin: R >4      -  Gentamicin: S <=1 Should not be used as monotherapy      -  Linezolid: S 2      -  Oxacillin: R >2      -  Penicillin: R >8      -  RIF- Rifampin: S <=1 Should not be used as monotherapy      -  Tetra/Doxy: S <=1      -  Trimethoprim/Sulfamethoxazole: S <=0.5/9.5      -  Vancomycin: S 2      Method Type: EROS    Culture - Blood (collected 02-14-20 @ 04:30)  Source: .Blood Blood-Peripheral  Final Report (02-19-20 @ 14:00):    No growth at 5 days.    Culture - Other (collected 02-13-20 @ 18:55)  Source: .Other wound  Final Report (02-15-20 @ 19:22):    No growth    Culture - Blood (collected 02-12-20 @ 04:50)  Source: .Blood None  Gram Stain (02-13-20 @ 14:24):    Growth in aerobic bottle: Yeast like cells  Final Report (02-18-20 @ 15:03):    Growth in aerobic bottle: Candida albicans    See previous culture [id number]    Culture - Blood (collected 02-11-20 @ 11:21)  Source: .Blood None  Gram Stain (02-12-20 @ 16:48):    Growth in aerobic bottle: Yeast like cells  Final Report (02-15-20 @ 14:05):    Growth in aerobic bottle: Candida albicans    ***Blood Panel PCR results on this specimen are available    approximately 3 hours after the Gram stain result.***    Gram stain, PCR, and/or culture results may not always    correspond due to difference in methodologies.    ************************************************************    This PCR assay was performed using [employer].    The following targets are tested for: Enterococcus,    vancomycin resistant enterococci, Listeria monocytogenes,    coagulase negative staphylococci, S. aureus,    methicillin resistant S. aureus, Streptococcus agalactiae    (Group B), S. pneumoniae, S. pyogenes (Group A),    Acinetobacter baumannii, Enterobacter cloacae, E. coli,    Klebsiella oxytoca, K. pneumoniae, Proteus sp.,    Serratia marcescens, Haemophilus influenzae,    Neisseria meningitidis, Pseudomonas aeruginosa, Candida    albicans, C. glabrata, C krusei, C parapsilosis,    C. tropicalis and the KPC resistance gene.  Organism: Blood Culture PCR  Candida albicans (02-15-20 @ 14:05)  Organism: Candida albicans (02-15-20 @ 14:05)      -  Fluconazole: S 0.25 Fluconazole = Data established for mucosal disease, and is generally applicable for invasive disease.  Susceptibility is dependent on achieving the maximal possible blood level.  Doses of 400 MG/day or more may be required in adults with normal renalfunction and body habitus.      -  Interpretation: See note This test method is not approved by the FDA and is for research use only.  The performance characteristics of this assay may have been determined by "[employer] and [facility], [place], [place]                            N/I - No interpretation available                                                         SDD – Sensitive Dose Dependent      Method Type: YSTMIC  Organism: Blood Culture PCR (02-15-20 @ 14:05)      -  Candida albicans: Detec      Method Type: PCR    Culture - Acid Fast - Bronchial w/Smear (collected 02-03-20 @ 15:00)  Source: .Bronchial None  Preliminary Report (02-12-20 @ 15:05):    No growth at 1 week.    Culture - Fungal, Bronchial (collected 02-03-20 @ 15:00)  Source: .Bronchial None  Preliminary Report (02-12-20 @ 15:02):    No growth    Culture - Bronchial (collected 02-03-20 @ 15:00)  Source: .Bronchial None  Gram Stain (02-04-20 @ 05:52):    No polymorphonuclear cells seen per low power field    No squamous epithelial cells per low power field    No organisms seen  Final Report (02-05-20 @ 19:32):    Normal Respiratory Nikki present    Culture - Blood (collected 01-31-20 @ 09:50)  Source: .Blood Blood  Final Report (02-05-20 @ 23:01):    No growth at 5 days.    Culture - Blood (collected 01-31-20 @ 09:50)  Source: .Blood Blood  Final Report (02-05-20 @ 23:01):    No growth at 5 days.        Lactate, Blood: 6.1 mmol/L (02-23-20 @ 19:06)      INFECTIOUS DISEASES TESTING  Fungitell: >500 (02-11-20 @ 11:21)  Fungitell: <31 (02-04-20 @ 04:40)  Streptococcus Pneumoniae Ag Urine: Negative (02-02-20 @ 11:00)  MRSA PCR Result.: Positive (02-01-20 @ 20:40)  Procalcitonin, Serum: 1.86 (02-01-20 @ 20:30)  Procalcitonin, Serum: 1.35 (02-01-20 @ 11:17)  Fungitell: 49 (02-01-20 @ 11:17)  Procalcitonin, Serum: 0.77 (02-01-20 @ 04:46)  Rapid RVP Result: Detected (01-31-20 @ 16:24)  Legionella Antigen, Urine: Negative (01-31-20 @ 15:36)  Streptococcus Pneumoniae Ag Urine: Negative (01-31-20 @ 15:36)  Legionella Antigen, Urine: Negative (01-20-20 @ 02:50)  Procalcitonin, Serum: 0.05 (01-16-20 @ 11:30)  MRSA PCR Result.: Positive (01-14-20 @ 13:59)  Flu A Result: Negative (01-13-20 @ 13:10)  Flu B Result: Negative (01-13-20 @ 13:10)  RSV Result: Negative (01-13-20 @ 13:10)  MRSA PCR Result.: Negative (08-13-19 @ 08:46)      RADIOLOGY & ADDITIONAL TESTS:  I have personally reviewed the last available Chest xray  CXR      CT      CARDIOLOGY TESTING  12 Lead ECG:   Ventricular Rate 127 BPM    Atrial Rate 127 BPM    P-R Interval 128 ms    QRS Duration 80 ms    Q-T Interval 300 ms    QTC Calculation(Bezet) 436 ms    P Axis 41 degrees    R Axis 1 degrees    T Axis 32 degrees    Diagnosis Line Sinus tachycardia with Premature atrial complexes  Possible Left atrial enlargement  Nonspecific ST abnormality  Abnormal ECG    Confirmed by Mary Cole MD ([id number]) on 2/24/2020 8:03:09 AM (02-21-20 @ 18:39)  12 Lead ECG:   Ventricular Rate 100 BPM    Atrial Rate 100 BPM    P-R Interval 130 ms    QRS Duration 84 ms    Q-T Interval 366 ms    QTC Calculation(Bezet) 472 ms    P Axis 37 degrees    R Axis 1 degrees    T Axis 15 degrees    Diagnosis Line Normal sinus rhythm  Normal ECG    Confirmed by GREGG VALDES MD ([id number]) on 2/21/2020 10:14:45 AM (02-21-20 @ 07:45)      MEDICATIONS  albuterol/ipratropium for Nebulization 3  amLODIPine   Tablet 10  calcitriol  Solution 0.25  calcium carbonate    500 mG (Tums) Chewable 3  chlorhexidine 4% Liquid 1  cyanocobalamin 1000  dexMEDEtomidine Infusion 0.2  dextrose 5%. 1000  dextrose 50% Injectable 12.5  dextrose 50% Injectable 25  dextrose 50% Injectable 25  fluconAZOLE   Tablet 400  gabapentin 300  influenza   Vaccine 0.5  insulin glargine Injectable (LANTUS) 18  insulin lispro (HumaLOG) corrective regimen sliding scale   insulin lispro Injectable (HumaLOG) 6  metoprolol tartrate 50  montelukast 10  multivitamin/minerals 1  mupirocin 2% Ointment 1  pantoprazole   Suspension 40      WEIGHT  Weight (kg): 75 (02-24-20 @ 22:00)  Creatinine, Serum: 0.7 mg/dL (02-26-20 @ 08:39)      ANTIBIOTICS:  fluconAZOLE   Tablet 400 milliGRAM(s) Oral daily      All available historical records have been reviewed

## 2020-02-26 NOTE — PROCEDURE NOTE - NSTRACHPOSTINTU_RESP_A_CORE
Appropriate capnography/Breath sounds bilateral/Breath sounds equal
Breath sounds equal/Chest X-Ray/Appropriate capnography/Breath sounds bilateral

## 2020-02-26 NOTE — PROCEDURE NOTE - NSPROCDETAILS_GEN_ALL_CORE
patient pre-oxygenated, tube inserted, placement confirmed
patient pre-oxygenated, tube inserted, placement confirmed/connected to ventilator
sterile technique, catheter placed/ultrasound guidance/lumen(s) aspirated and flushed/sterile dressing applied/guidewire recovered
guidewire recovered/sterile technique, catheter placed/ultrasound guidance/lumen(s) aspirated and flushed/sterile dressing applied
ultrasound guidance/guidewire recovered/lumen(s) aspirated and flushed/sterile dressing applied/sterile technique, catheter placed

## 2020-02-26 NOTE — PROGRESS NOTE ADULT - ASSESSMENT
74y female with PMH of asthma, COPD not on home O2, autoimmune hepatitis on prednisone and tacrolimus, heterozygous prothrombin gene mutation with hx of PE and DVT on coumadin presents to the hospital complaining of cough, hemoptysis, SOB and desaturation to 70s. Pt was found to be flu+ likely viral PNA complicated by diffuse alveolar hemorrhage and ARDS . Hospital course complicated by DVT in L saphenous vein with possible DVT and REJI likely secondary to ATN due to vanco.      #ARDS  1-alveolar hemorrhage   2-flu +ve with post viral pneumonia- was resolved ; now recurrent ARF again- for urgent intubation this AM;   -  respiratory fellow at bedside  3-possible PE: cant start AC due to alveolar hemorrhage    - s/p DDAVP 4 doses   - Solumedrol- continue; review possible INCrease with pulm/CC  - Lasix 40   - s/p cefepime and Levaquin (completed 2/21) - stopped vanc due to REJI  - consider restart of Antibiotics  - completed oseltamivir 30 mg   - C/w Duoneb   - Bronch results - neg culture, neg fungal, cytology positive for inflammatory cells, no organisms  - Repeat Bronch 2/16 showed bleeding from DAH    #Candidemia   - likely A line is source , found to be foul-smelling and surrounded by pus during removal  - blood cultures grew candida  - cultures neg since 2/14  - sputum cx grew MRSA   - oral fluconazole 14 days until 2/27  - Zyvox 7 days total- stopped 2/23 for thrombocytopenia/anemia  - ID following  - trend CBC and EKG daily (QTc)     #Rectal Bleeding   - GI following   - received 1U of blood 2/23, hemoglobin stable  - Protonix switch to PO   - FlexSig showed 2 rectal ulcers one clean one crater, s/p clipping  - keep HGB above 8  - active type and screen   - H/H stable  - cbc q24   - Avoid Dignshield or rectal tube     #Hypernatremia  - free water 250 ml q4  - Na+ 148 (2/24)    #REJI oliguric likely ATN with Vanc toxicity   - Resolved    # 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  - duplex shows DVT consistent with prior  - might need IVC filter---Agree  - not candidate for AC at this time due to alveolar hemorrhage     #Immunosuppressed: Autoimmune Hepatitis   - solumedrol 60 mg qd  - prednisone 60 mg PO qd at home now on solumedrol   - CMV PCR neg this admission   - f/u repeat tarcolimus lvl  - With high risk for infectious processes while on chronic immunosuppression therapy- see candida above    # HTN  - holding amlodipine 10 and lopressor 50   - Monitor BPs    # heterozygous prothrombin gene mutation with hx of PE and DVT on coumadin  - holding coumadin for now due to alveolar hemorrhage   - monitor coags    #0.5 cm of GB polyp  -needs f/u US in 12 months     # Hx of asthma  - C/w montelukast qd    #Deconditioning   - pt severely deconditioned due to laying in bed and chronic steroid use   - PT/OT/Rehab once downgraded     # GI PPx: Protonix 40 mg PO qd  # DVT PPx: sequentials to right leg only  # Activity: bedrest   # Dispo: back to ICU now; to be intubated;       family considering options in view of her recurrent intubation-  - will review daily   # Code Status: FULL- review DNR with the family at this time

## 2020-02-26 NOTE — PROGRESS NOTE ADULT - SUBJECTIVE AND OBJECTIVE BOX
INTERVENTIONAL RADIOLOGY BRIEF-OPERATIVE NOTE    Procedure: IVC filter placement    Pre-Op Diagnosis: DVT    Post-Op Diagnosis: DVT    Attending: Julio  Resident: Klaus    Anesthesia (type):  [ ] General Anesthesia  [ ] Sedation  [ ] Spinal Anesthesia  [ x] Local/Regional    Contrast: 20cc visipaque    Estimated Blood Loss: Minimal, < 5 cc    Condition:   [ ] Critical  [ ] Serious  [ ] Fair   [ c] Good    Findings/Follow up Plan of Care: Successful placement of Option Elite IVC filter via Right IJ access. IVC venogram confirms proper infrarenal position. Pt tolerated procedure well. No immediate complicationss    Specimens Removed: none     Implants: as above    Complications: none immediate    Disposition: Transfer back to floor      Please call Interventional Radiology [phone] with any questions, concerns, or issues.

## 2020-02-27 NOTE — CONSULT NOTE ADULT - ASSESSMENT
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  She has had long, complicated hospital course (see HPI), including intubation x 2, finding of alveolar hemorrhage, candidemia, REJI on CKD III (now resolved), L saphenous DVT, bleeding rectal ulcer, and IVC filter placement.  Hematology consulted for pancytopenia.    1. Pancytopenia-Likely multifactorial, including bone marrow suppression secondary to severe illness, sepsis, antibiotic use, poor nutrition, active bleeding (from alveolar hemorrhage and rectal ulcer), and frequent phlebotomy.  Recommend continued supportive care.  Maintain active type and screen.  If not bleeding, transfuse for hemoglobin <7 and platelet count < 10.  Upon discharge, patient should have repeat CBC with primary care physician or Dr. Isidro, in order to ensure count recovery.  2. 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  Patient is s/p IVC filter placement. 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  She has had long, complicated hospital course (see HPI), including intubation x 2, finding of alveolar hemorrhage, candidemia, REJI on CKD III (now resolved), L saphenous DVT, bleeding rectal ulcer, and IVC filter placement.  Hematology consulted for pancytopenia.    1. Pancytopenia-Likely multifactorial, including bone marrow suppression secondary to severe illness, sepsis, antibiotic use, poor nutrition, active bleeding (from alveolar hemorrhage and rectal ulcer), and frequent phlebotomy.  Recommend continued supportive care.  Maintain active type and screen.  If not bleeding, transfuse for hemoglobin <7 and platelet count < 10. In addition, suggest obtaining a reticulocyte count and LDH to rule out any additional remote possibility of hemolysis.   Upon discharge, patient should have repeat CBC with primary care physician or Dr. Isidro, in order to ensure count recovery.    2. 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  Patient is s/p IVC filter placement.    3. Rectal bleed. Needs to be investigated further once she is stabilized. 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  She has had long, complicated hospital course (see HPI), including intubation x 2, finding of alveolar hemorrhage, candidemia, REJI on CKD III (now resolved), L saphenous DVT, bleeding rectal ulcer, and IVC filter placement.  Hematology consulted for pancytopenia.    1. Pancytopenia-Likely multifactorial, including bone marrow suppression secondary to severe illness, sepsis, antibiotic use, poor nutrition, active bleeding (from alveolar hemorrhage and rectal ulcer), and frequent phlebotomy.  Recommend continued supportive care.  Maintain active type and screen.  If not bleeding, transfuse for hemoglobin <7 and platelet count < 10. In addition, suggest obtaining a reticulocyte count and LDH to rule out any additional remote possibility of hemolysis. Another diagnosis to consider is drug-induced and the monitoring should continue tightly.  Upon discharge, patient should have repeat CBC with primary care physician or Dr. Isidro, in order to ensure count recovery.    2. 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  Patient is s/p IVC filter placement.    3. Rectal bleed. Needs to be investigated further once she is stabilized. 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  She has had long, complicated hospital course (see HPI), including intubation x 2, finding of alveolar hemorrhage, candidemia, REJI on CKD III (now resolved), L saphenous DVT, bleeding rectal ulcer, and IVC filter placement.  Hematology consulted for pancytopenia.    1. Pancytopenia-Likely multifactorial, including bone marrow suppression secondary to severe illness, sepsis, antibiotic use, poor nutrition, active bleeding (from alveolar hemorrhage and rectal ulcer), and frequent phlebotomy.  Recommend continued supportive care.  Maintain active type and screen.  If not bleeding, transfuse for hemoglobin <7 and platelet count < 10. If the decrease of the WBC count continues, also recommend starting Neupogen or equivalent that may be on formulary. In addition, suggest obtaining a reticulocyte count and LDH to rule out any additional remote possibility of hemolysis. Another diagnosis to consider is drug-induced and the present monitoring should continue tightly.  Upon discharge, patient should have repeat CBC with primary care physician or Dr. Isidro, in order to ensure count recovery.    2. 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  Patient is s/p IVC filter placement.    3. Rectal bleed. Needs to be investigated further once she is stabilized.

## 2020-02-27 NOTE — PROCEDURE NOTE - NSSITEPREP_SKIN_A_CORE
chlorhexidine
chlorhexidine
Adherence to aseptic technique: hand hygiene prior to donning barriers (gown, gloves), don cap and mask, sterile drape over patient/chlorhexidine
chlorhexidine
chlorhexidine

## 2020-02-27 NOTE — PROGRESS NOTE ADULT - ASSESSMENT
ASSESSMENT  75y/o F w/ hx of asthma, COPD not on home O2, autoimmune hepatitis on prednisone and tacrolimus, heterozygous prothrombin gene mutation with hx of PE and DVT on coumadin with recent hospitalization in January 2020 with a diagnosis of pneumonia presents for worsening SOB, hemoptysis and cough.    IMPRESSION  #Acute hypoxemia, now intubated 2/26 AM, likely 2/2 PE given +DVTs s/p Option Elite IVC filter via Right IJ access., possible GNR PNA    CXR somewhat improved since 2/24, stable    Bronch with mod PMN, GNR  #MRSA VAP    2/14 tracheal cx   Moderate Methicillin resistant Staphylococcus aureus    completed 8 day course, noted to have thrombocytopenia on linezolid  #Candidemia Fungitell: >500 (02-11-20 @ 11:21) likely CLABSI    2/11 BCX +, 2/12 BCX +, 2/13 BCX (-) RIJ 2/11. Groin a-line 2/4- removed 2/13     Fungitell: <31 (02-04-20 @ 04:40), Fungitell: 49 (02-01-20 @ 11:17)    Ophtho- no endophthalmitis   #Flu + AH1, Alveolar hemorrhage, ?superimposed atypical PNA (imaging not really consistent with bacterial PNA). Recent bronch 1/20 negative for PCP, Fungal, etc, previous bronch cx with yeast (likely oral contaminant) since GMS neg    2/3 Bronch   No organisms seen, 2/3 cytopath Rare atypical cells, few ciliated bronchial cells, alveolar macrophages and inflammatory cells, mainly neutrophils.    Strep urine Ag neg, serum crypto Ag neg, CMV PCR undetectable, AFB neg    Quantiferon indeterminate    Lactate Dehydrogenase, Serum: 607 (02.03.20 @ 04:30)    Procalcitonin, Serum: 1.86:    CTA chest showing no PE but showing interval development of multifocal bilateral groundglass opacities as well as new moderate to severe bronchiectasis in the right lung,   1/13 CT b/l GGOs, compatible with intersitial edema      Serum Pro-Brain Natriuretic Peptide: 722 pg/mL (01.31.20 @ 09:25)<-- Serum Pro-Brain Natriuretic Peptide: 345 pg/mL (01.13.20 @ 12:10)    During last hospitalization: bronch cx bacterial NG, +yeast, no ID, neg legionella, + MRSA PCR    MRSA PCR Result.: Positive (02-01-20 @ 20:40)  #Severe sepsis on admission P>90, WBC 19, RR>20, lactic acidosis Lactate, Blood: 2.9 mmol/L (01-31-20 @ 09:25)    afebrile   #Elevated Alk ph, transaminitis  #LLE wound, not infection     12/7 WCX MSSA, Kleb, bacteroides    8/2019 MRSA in a wound   #Immunosuppressed: autoimmune hepatitis on prednisone and tacrolimus    RECOMMENDATIONS  - caspo ends today 2/27 to complete 14 days from cleared BCX  - f/u bronch GNR, possible colonization but given many PMNs, ok to start meropenem 1g q8h IV for now     Spectra [id number]

## 2020-02-27 NOTE — PROCEDURE NOTE - NSPOSTCAREGUIDE_GEN_A_CORE
Verbal/written post procedure instructions were given to patient/caregiver
Verbal/written post procedure instructions were given to patient/caregiver
Care for catheter as per unit/ICU protocols
Care for catheter as per unit/ICU protocols
Verbal/written post procedure instructions were given to patient/caregiver
Verbal/written post procedure instructions were given to patient/caregiver/Instructed patient/caregiver to follow-up with primary care physician/Instructed patient/caregiver regarding signs and symptoms of infection

## 2020-02-27 NOTE — PROGRESS NOTE ADULT - ASSESSMENT
73 y/o female with pmhx of asthma, HTN,  autoimmune hepatitis, heterozygous prothrombin gene mutation with hx of PE and DVT on coumadin admitted for SOB     Acute hypoxic resp failure with hypoxia due to influenza, HCAP  Septic shock in immunocompromised patient, Severe sepsis present on admission   - completed course of treatment- antibiotics and antiviral completed for initial infection  - MRSA VAP treated with Zyvox for 10 days   - now intubated again and on pressors, ? PE  - GFF placed yesterday  - remains on IV Solu-medrol  - ID f/u noted started on Meropenem  - continue Duoneb q6h and q4h PRN    Acute Blood Loss Anemia  - had rectal tube earlier on this adm which was discontinued due to ulcer  - has a hx of hemorrhoids   - last colonoscopy about 1 year ago with Dr Washington  - on Protonix  - has had multiple PRBC's this adm  - GI f/u noted    Candedemia  - central line change noted  - cultures negative x2  - 2D echo no evidence of vegatation  - ophth evaluation noted, no clinical evidence, low suspicion for ocular fungemia/endogenous endophthalmitis   - completed treatment today, 2/27    REJI on CKD 3  - creatinine trending down  - renal f/u noted  - urine output noted  - continue to monitor  - follow renal recommendations    Autoimmune hepatitis  - On prednisone 60 mg PO qd and Tacrolimus at home  - now on Solu-medrol  - Tacrolimus held    HTN  - now off Labetolol    Heterozygous prothrombin gene mutation with hx of PE and DVT on coumadin:  - Coumadin had been held for alveolar hemorrhage     Hx of asthma  - on Montelukast qd    Multiple Skin tears/ wounds  - local care  - elevate extremities    Dysphagia  - failed swallowing evaluation today  - continue enteral feedings    Diet: Enteral feedings to be held for GI evaluation  GI PPx: Protonix  DVT PPx: sequentials  Activity: bedrest  Dispo: readmitted to hospital from  rehab at [facility], remains ICU      Continue supportive measures, patient remains acutely ill, with improving mental status. Overall prognosis poor.

## 2020-02-27 NOTE — PROGRESS NOTE ADULT - ASSESSMENT
IMPRESSION:    Acute hypoxic respiratory failure s/p reintubation/ acute DVT/ cant RO PE s/p bronch  DAH resolving  Candidemia on caspofungin  HO Autoimmune hepatitis on tacrolimus and solumedrol  BNP/ trop  S/P GFF      PLAN:    CNS:  SAT, versed / fentanyl    HEENT: ET care.  Oral care.      PULMONARY:  HOB @ 45 degrees. Solumedrol 60 mg qd. Wean O2. Dec PEEP to 5    CARDIOVASCULAR:  Keep I=O. BNP, trop, trend LA    GI: GI prophylaxis.  NG feeding.    free water 250 cc Q 4 hrs. ruq SONO    RENAL:  Follow up lytes. Replete as needed.     INFECTIOUS DISEASE: Follow up cultures.  Continue Anti fungal add meropnem till cx    HEMATOLOGICAL:  DVT prophylaxis--> SCD; repeat CBC    ENDOCRINE:  Follow up FS.  Insulin protocol if needed.    MUSCULOSKELETAL: Bed rest     Prognosis: Poor prognosis     change TLC   REPEAT LABS

## 2020-02-27 NOTE — PROGRESS NOTE ADULT - ASSESSMENT
74y female with PMH of asthma, COPD not on home O2, autoimmune hepatitis on prednisone and tacrolimus, heterozygous prothrombin gene mutation with hx of PE and DVT on coumadin presents to the hospital complaining of cough, hemoptysis. Found to have viral pneumonia with respiratory failure and intubated. Extubated after prolonged course but required re-intubation 2/26 for worsening respiratory status and received IVC filter 2/26 due to not being able to AC (alveolar hemorrhage).     #ARDS, alveolar hemorrhage, viral pneumonia, VAC pneumonia due to MRSA   - completed Zyvox, Levaquin, Cefepime course, and oseltamivir   - C/w Duoneb   - Solumedrol IV 60mg qd  - Bronch results - neg culture, neg fungal, cytology positive for inflammatory cells, no organisms  - Bronch 2/16 showed bleeding from DAH  - Bronch 2/26 for BAL, cultures show gram - rods, start meropenem 2/27    #Elevated lactate/tropnemia  - lactate , 1.7-->3.1-->2.6 (2/27)  - on meropenem for positive bronchial culture  - tropnemia due to demand ischemia?    #Candidemia, secondary to central line  - cultures neg since 2/14  - oral fluconazole 14 days, switched to caspofungin for rising LFT (2/26)  - ID following    #Rectal Bleeding   - GI following   - received 1U of blood 2/23, hemoglobin stable  - Protonix 40mg PO qd   - FlexSig showed 2 rectal ulcers one clean one crater, s/p clipping  - CBC q24  - Avoid Dignshield or rectal tube     #Hypokalemia  -Given 40 mEq twice today, f/u repeat    #REJI oliguric likely ATN with Vanc toxicity   - Resolved  - Monitor BMP    # 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  - duplex shows DVT consistent with prior  - not candidate for AC at this time due to alveolar hemorrhage   - IVC filter placed 2/26    #Immunosuppressed: Autoimmune Hepatitis   - solumedrol 60 mg qd  - holding home prednisone 60 mg PO qd   - CMV PCR neg this admission   - f/u repeat tarcolimus level    #HTN  - Continue amlodipine 10 qd, Lopressor 50 BID    #Heterozygous prothrombin gene mutation with hx of PE and DVT on coumadin  - holding coumadin for now due to alveolar hemorrhage   - monitor coags    #0.5 cm of GB polyp  - needs f/u US in 12 months     #Hx of asthma  - C/w montelukast qd    #Deconditioning   -pt severely deconditioned due to prolonged hospital stay  - PT/Rehab     # GI PPx: Protonix 40 mg PO qd  # DVT PPx: sequentials to right leg only  # Code Status: FULL 74y female with PMH of asthma, COPD not on home O2, autoimmune hepatitis on prednisone and tacrolimus, heterozygous prothrombin gene mutation with hx of PE and DVT on coumadin presents to the hospital complaining of cough, hemoptysis. Found to have viral pneumonia with respiratory failure and intubated. Extubated after prolonged course but required re-intubation 2/26 for worsening respiratory status and received IVC filter 2/26 due to not being able to AC (alveolar hemorrhage).     #ARDS, alveolar hemorrhage, viral pneumonia, VAC pneumonia due to MRSA   - completed Zyvox, Levaquin, Cefepime course, and oseltamivir   - C/w Duoneb   - Solumedrol IV 60mg qd  - Bronch results - neg culture, neg fungal, cytology positive for inflammatory cells, no organisms  - Bronch 2/16 showed bleeding from DAH  - Bronch 2/26 for BAL, cultures show gram - rods, start meropenem 2/27    #Elevated lactate/tropnemia  - lactate , 1.7-->3.1-->2.6 (2/27)  - on meropenem for positive bronchial culture  - tropnemia due to demand ischemia?  - f/u repeat trop/lactate    #Candidemia, secondary to central line  - cultures neg since 2/14  - oral fluconazole 14 days, switched to caspofungin for rising LFT (2/26)  - ID following    #Rectal Bleeding   - GI following   - received 1U of blood 2/23, hemoglobin stable  - Protonix 40mg PO qd   - FlexSig showed 2 rectal ulcers one clean one crater, s/p clipping  - CBC q24  - Avoid Dignshield or rectal tube     #Hypokalemia  -Given 40 mEq twice today, f/u repeat    #REJI oliguric likely ATN with Vanc toxicity   - Resolved  - Monitor BMP    # 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  - duplex shows DVT consistent with prior  - not candidate for AC at this time due to alveolar hemorrhage   - IVC filter placed 2/26    #Immunosuppressed: Autoimmune Hepatitis   - solumedrol 60 mg qd  - holding home prednisone 60 mg PO qd   - CMV PCR neg this admission   - f/u repeat tarcolimus level    #HTN  - Continue amlodipine 10 qd, Lopressor 50 BID    #Heterozygous prothrombin gene mutation with hx of PE and DVT on coumadin  - holding coumadin for now due to alveolar hemorrhage   - monitor coags    #0.5 cm of GB polyp  - needs f/u US in 12 months     #Hx of asthma  - C/w montelukast qd    #Deconditioning   -pt severely deconditioned due to prolonged hospital stay  - PT/Rehab     # GI PPx: Protonix 40 mg PO qd  # DVT PPx: sequentials to right leg only  # Code Status: FULL

## 2020-02-27 NOTE — PROGRESS NOTE ADULT - SUBJECTIVE AND OBJECTIVE BOX
ELDER, DONI  75y  Female  HPI:  73y/o F w/ hx of asthma, COPD not on home O2, autoimmune hepatitis on prednisone and tacrolimus, heterozygous prothrombin gene mutation with hx of PE and DVT on coumadin with recent hospitalization in January 2020 with a diagnosis of pneumonia presents for worsening SOB, hemoptysis and cough. Everything started yesterday night when patient was in bed, started to feel shortness of breath and had many episodes of hemoptysis with clots, she also had substernal chest pain non radiating, moderate in intensity that worsens on coughing. She denies fever but endorses chills. She also admits for lightheadedness that occurred yesterday, no HA, abd pain, dysuria or paresthesias. She had 2 loose bowel movements since yesterday with some blood in the stools that she attributes to her hemorrhoids. She stopped Coumadin couple of days ago since her INR was supratherapeutic. She is from [facility] short term and also mentions that her  and another family member have the flu and she was exposed to them. She did not have the flu shot this season.  In ED, temp was 100.1 rectally, tachycardic ( sinus) , she was saturating to 70s on non rebreather and her SaO2 went up to 95%. ABG showing respiratory alkalosis initially and then corrected after BIPAP placement and correction of RR.  CTA chest showing no PE but showing interval development of multifocal bilateral groundglass opacities as well as new moderate to severe bronchiectasis in the right lung. Findings are concerning for an acute infectious/inflammatory process. (31 Jan 2020 14:36)    MEDICATIONS  (STANDING):  albuterol/ipratropium for Nebulization 3 milliLiter(s) Nebulizer every 6 hours  calcitriol  Solution 0.25 MICROGram(s) Oral daily  calcium carbonate    500 mG (Tums) Chewable 3 Tablet(s) Chew every 8 hours  caspofungin IVPB 50 milliGRAM(s) IV Intermittent every 24 hours  chlorhexidine 0.12% Liquid 15 milliLiter(s) Oral Mucosa two times a day  chlorhexidine 4% Liquid 1 Application(s) Topical <User Schedule>  cyanocobalamin 1000 MICROGram(s) Oral daily  dextrose 5%. 1000 milliLiter(s) (50 mL/Hr) IV Continuous <Continuous>  dextrose 50% Injectable 12.5 Gram(s) IV Push once  dextrose 50% Injectable 25 Gram(s) IV Push once  dextrose 50% Injectable 25 Gram(s) IV Push once  fentaNYL   Infusion. 0.5 MICROgram(s)/kG/Hr (3.75 mL/Hr) IV Continuous <Continuous>  gabapentin 300 milliGRAM(s) Oral at bedtime  influenza   Vaccine 0.5 milliLiter(s) IntraMuscular once  insulin lispro (HumaLOG) corrective regimen sliding scale   SubCutaneous every 6 hours  meropenem  IVPB      meropenem  IVPB 1000 milliGRAM(s) IV Intermittent every 8 hours  methylPREDNISolone sodium succinate Injectable 60 milliGRAM(s) IV Push daily  midazolam Infusion 0.02 mG/kG/Hr (1.444 mL/Hr) IV Continuous <Continuous>  montelukast 10 milliGRAM(s) Oral at bedtime  multivitamin/minerals 1 Tablet(s) Oral daily  mupirocin 2% Ointment 1 Application(s) Topical two times a day  norepinephrine Infusion 0.05 MICROgram(s)/kG/Min (6.769 mL/Hr) IV Continuous <Continuous>  pantoprazole   Suspension 40 milliGRAM(s) Oral before breakfast    MEDICATIONS  (PRN):  acetaminophen   Tablet .. 650 milliGRAM(s) Oral every 6 hours PRN Temp greater or equal to 38C (100.4F)  dextrose 40% Gel 15 Gram(s) Oral once PRN Blood Glucose LESS THAN 70 milliGRAM(s)/deciliter  glucagon  Injectable 1 milliGRAM(s) IntraMuscular once PRN Glucose LESS THAN 70 milligrams/deciliter    INTERVAL EVENTS: Patient seen today, intubated again with acute hypoxic respiratory failure, on pressors.    T(C): 37.3 (02-27-20 @ 20:00), Max: 37.3 (02-27-20 @ 20:00)  HR: 96 (02-27-20 @ 20:00) (80 - 140)  BP: 101/50 (02-27-20 @ 19:45) (61/41 - 196/96)  RR: 16 (02-27-20 @ 20:00) (15 - 71)  SpO2: 97% (02-27-20 @ 20:00) (94% - 100%)  Wt(kg): --Vital Signs Last 24 Hrs  T(C): 37.3 (27 Feb 2020 20:00), Max: 37.3 (27 Feb 2020 20:00)  T(F): 99.1 (27 Feb 2020 20:00), Max: 99.1 (27 Feb 2020 20:00)  HR: 96 (27 Feb 2020 20:00) (80 - 140)  BP: 101/50 (27 Feb 2020 19:45) (61/41 - 196/96)  BP(mean): 62 (27 Feb 2020 19:45) (51 - 147)  RR: 16 (27 Feb 2020 20:00) (15 - 71)  SpO2: 97% (27 Feb 2020 20:00) (94% - 100%)    PHYSICAL EXAM:  GENERAL: NAD, vented, sedated  CHEST/LUNG: Coarse BS  HEART: S1, S2, Regular rate and rhythm  ABDOMEN: Soft, Nontender,  Bowel sounds present  EXTREMITIES: ++ edema    Labs:                        7.8    4.40  )-----------( 64       ( 27 Feb 2020 18:34 )             24.9             02-27    145  |  105  |  40<H>  ----------------------------<  159<H>  4.0   |  27  |  0.7    Ca    7.4<L>      27 Feb 2020 18:34  Mg     1.9     02-27    TPro  4.1<L>  /  Alb  1.8<L>  /  TBili  0.7  /  DBili  0.4<H>  /  AST  43<H>  /  ALT  67<H>  /  AlkPhos  169<H>  02-27    LIVER FUNCTIONS - ( 27 Feb 2020 04:30 )  Alb: 1.8 g/dL / Pro: 4.1 g/dL / ALK PHOS: 169 U/L / ALT: 67 U/L / AST: 43 U/L / GGT: x                   CARDIAC MARKERS ( 27 Feb 2020 14:18 )  x     / 0.13 ng/mL / x     / x     / x      CARDIAC MARKERS ( 27 Feb 2020 10:12 )  x     / 0.12 ng/mL / 118 U/L / x     / 3.7 ng/mL      ABG - ( 27 Feb 2020 14:17 )  pH, Arterial: 7.42  pH, Blood: x     /  pCO2: 45    /  pO2: 60    / HCO3: 29    / Base Excess: 4.2   /  SaO2: 91          Culture - Acid Fast - Bronchial w/Smear (collected 26 Feb 2020 14:45)  Source: .Bronchial None    Culture - Fungal, Bronchial (collected 26 Feb 2020 14:45)  Source: .Bronchial None  Preliminary Report (27 Feb 2020 08:21):    Testing in progress    Culture - Bronchial (collected 26 Feb 2020 14:45)  Source: .Bronchial None  Gram Stain (27 Feb 2020 07:36):    Moderate polymorphonuclear leukocytes seen per low power field    Rare Squamous epithelial cells seen per low power field    Numerous Gram Negative Rods seen per oil power field      RADIOLOGY & ADDITIONAL TESTS:  < from: Xray Chest 1 View- PORTABLE-Urgent (02.27.20 @ 13:00) >  FINDINGS:    Support devices: ET tube terminates above the baljinder. Stable enteric tube. New right IJ catheter with tip in the SVC/cavoatrial junction    Cardiac/mediastinum/hilum: Stable.    Lung parenchyma/Pleura: Unchanged.    Skeleton/soft tissues: Stable.    IMPRESSION:    Overall no change since the prior exam. No pneumothorax      < end of copied text >

## 2020-02-27 NOTE — PROGRESS NOTE ADULT - SUBJECTIVE AND OBJECTIVE BOX
DONI PATRICK  75y, Female  Allergy: No Known Allergies      LOS  27d    CHIEF COMPLAINT: SOB and desaturation (26 Feb 2020 20:14)      INTERVAL EVENTS/HPI  - +DVT s/p Successful placement of Option Elite IVC filter via Right IJ access.  - Bronch with PMN and GNRs  - T(F): , Max: 98.6 (02-26-20 @ 12:00)  - WBC Count: 2.79 (02-27-20 @ 04:30)  WBC Count: 2.09 (02-26-20 @ 08:39)  - Creatinine, Serum: 0.7 (02-27-20 @ 04:30)  Creatinine, Serum: 0.7 (02-26-20 @ 08:39)       ROS  unable to obtain history secondary to patient's mental status and/or sedation     VITALS:  T(F): 95.8, Max: 98.6 (02-26-20 @ 12:00)  HR: 86  BP: 89/46  RR: 29Vital Signs Last 24 Hrs  T(C): 35.4 (27 Feb 2020 04:00), Max: 37 (26 Feb 2020 12:00)  T(F): 95.8 (27 Feb 2020 04:00), Max: 98.6 (26 Feb 2020 12:00)  HR: 86 (27 Feb 2020 07:41) (80 - 140)  BP: 89/46 (27 Feb 2020 07:00) (55/45 - 196/96)  BP(mean): 62 (27 Feb 2020 07:00) (50 - 147)  RR: 29 (27 Feb 2020 07:00) (15 - 71)  SpO2: 100% (27 Feb 2020 07:41) (65% - 100%)      PHYSICAL EXAM:  Gen: intubated  HEENT: Normocephalic, atraumatic  Neck: supple, no lymphadenopathy  CV: Regular rate & regular rhythm  Lungs: decreased BS at bases, no fremitus  Abdomen: Soft, BS present  Ext: Warm, well perfused anasarca, multiple ecchymoses, LLE ulcer healing   Neuro: sedated  Skin: no rash, no erythema  Lines: no phlebitis    FH: Non-contributory  Social Hx: Non-contributory    TESTS & MEASUREMENTS:                        7.7    2.79  )-----------( 54       ( 27 Feb 2020 04:30 )             24.1     02-27    148<H>  |  107  |  41<H>  ----------------------------<  262<H>  3.5   |  28  |  0.7    Ca    7.3<L>      27 Feb 2020 04:30  Mg     1.9     02-27    TPro  4.1<L>  /  Alb  1.8<L>  /  TBili  0.7  /  DBili  0.4<H>  /  AST  43<H>  /  ALT  67<H>  /  AlkPhos  169<H>  02-27    eGFR if Non African American: 85 mL/min/1.73M2 (02-27-20 @ 04:30)  eGFR if African American: 98 mL/min/1.73M2 (02-27-20 @ 04:30)  eGFR if Non African American: 85 mL/min/1.73M2 (02-26-20 @ 08:39)  eGFR if : 98 mL/min/1.73M2 (02-26-20 @ 08:39)    LIVER FUNCTIONS - ( 27 Feb 2020 04:30 )  Alb: 1.8 g/dL / Pro: 4.1 g/dL / ALK PHOS: 169 U/L / ALT: 67 U/L / AST: 43 U/L / GGT: x               Culture - Bronchial (collected 02-26-20 @ 14:45)  Source: .Bronchial None  Gram Stain (02-27-20 @ 07:36):    Moderate polymorphonuclear leukocytes seen per low power field    Rare Squamous epithelial cells seen per low power field    Numerous Gram Negative Rods seen per oil power field    Culture - Blood (collected 02-17-20 @ 05:17)  Source: .Blood None  Final Report (02-22-20 @ 14:00):    No growth at 5 days.    Culture - Blood (collected 02-15-20 @ 04:57)  Source: .Blood None  Final Report (02-20-20 @ 18:01):    No growth at 5 days.    Culture - Sputum (collected 02-14-20 @ 17:00)  Source: .Sputum Sputum  Gram Stain (02-15-20 @ 04:56):    Few Squamous epithelial cells per low power field    Few polymorphonuclear leukocytes per low power field    Few Yeast like cells per oil power field    Few Gram variable coccobacilli per oil power field  Final Report (02-16-20 @ 17:44):    Moderate Methicillin resistant Staphylococcus aureus    Normal Respiratory Nikki present  Organism: Methicillin resistant Staphylococcus aureus (02-16-20 @ 17:44)  Organism: Methicillin resistant Staphylococcus aureus (02-16-20 @ 17:44)      -  Ampicillin/Sulbactam: R <=8/4      -  Cefazolin: R <=4      -  Clindamycin: S <=0.25      -  Erythromycin: R >4      -  Gentamicin: S <=1 Should not be used as monotherapy      -  Linezolid: S 2      -  Oxacillin: R >2      -  Penicillin: R >8      -  RIF- Rifampin: S <=1 Should not be used as monotherapy      -  Tetra/Doxy: S <=1      -  Trimethoprim/Sulfamethoxazole: S <=0.5/9.5      -  Vancomycin: S 2      Method Type: EROS    Culture - Blood (collected 02-14-20 @ 04:30)  Source: .Blood Blood-Peripheral  Final Report (02-19-20 @ 14:00):    No growth at 5 days.    Culture - Other (collected 02-13-20 @ 18:55)  Source: .Other wound  Final Report (02-15-20 @ 19:22):    No growth    Culture - Blood (collected 02-12-20 @ 04:50)  Source: .Blood None  Gram Stain (02-13-20 @ 14:24):    Growth in aerobic bottle: Yeast like cells  Final Report (02-18-20 @ 15:03):    Growth in aerobic bottle: Candida albicans    See previous culture [id number]    Culture - Blood (collected 02-11-20 @ 11:21)  Source: .Blood None  Gram Stain (02-12-20 @ 16:48):    Growth in aerobic bottle: Yeast like cells  Final Report (02-15-20 @ 14:05):    Growth in aerobic bottle: Candida albicans    ***Blood Panel PCR results on this specimen are available    approximately 3 hours after the Gram stain result.***    Gram stain, PCR, and/or culture results may not always    correspond due to difference in methodologies.    ************************************************************    This PCR assay was performed using [employer].    The following targets are tested for: Enterococcus,    vancomycin resistant enterococci, Listeria monocytogenes,    coagulase negative staphylococci, S. aureus,    methicillin resistant S. aureus, Streptococcus agalactiae    (Group B), S. pneumoniae, S. pyogenes (Group A),    Acinetobacter baumannii, Enterobacter cloacae, E. coli,    Klebsiella oxytoca, K. pneumoniae, Proteus sp.,    Serratia marcescens, Haemophilus influenzae,    Neisseria meningitidis, Pseudomonas aeruginosa, Candida    albicans, C. glabrata, C krusei, C parapsilosis,    C. tropicalis and the KPC resistance gene.  Organism: Blood Culture PCR  Candida albicans (02-15-20 @ 14:05)  Organism: Candida albicans (02-15-20 @ 14:05)      -  Fluconazole: S 0.25 Fluconazole = Data established for mucosal disease, and is generally applicable for invasive disease.  Susceptibility is dependent on achieving the maximal possible blood level.  Doses of 400 MG/day or more may be required in adults with normal renalfunction and body habitus.      -  Interpretation: See note This test method is not approved by the FDA and is for research use only.  The performance characteristics of this assay may have been determined by [facility] and [facility], [place], [place]                            N/I - No interpretation available                                                         SDD – Sensitive Dose Dependent      Method Type: YSTMIC  Organism: Blood Culture PCR (02-15-20 @ 14:05)      -  Candida albicans: Detec      Method Type: PCR    Culture - Acid Fast - Bronchial w/Smear (collected 02-03-20 @ 15:00)  Source: .Bronchial None  Preliminary Report (02-12-20 @ 15:05):    No growth at 1 week.    Culture - Fungal, Bronchial (collected 02-03-20 @ 15:00)  Source: .Bronchial None  Preliminary Report (02-12-20 @ 15:02):    No growth    Culture - Bronchial (collected 02-03-20 @ 15:00)  Source: .Bronchial None  Gram Stain (02-04-20 @ 05:52):    No polymorphonuclear cells seen per low power field    No squamous epithelial cells per low power field    No organisms seen  Final Report (02-05-20 @ 19:32):    Normal Respiratory Nikki present    Culture - Blood (collected 01-31-20 @ 09:50)  Source: .Blood Blood  Final Report (02-05-20 @ 23:01):    No growth at 5 days.    Culture - Blood (collected 01-31-20 @ 09:50)  Source: .Blood Blood  Final Report (02-05-20 @ 23:01):    No growth at 5 days.        Lactate, Blood: 6.1 mmol/L (02-23-20 @ 19:06)      INFECTIOUS DISEASES TESTING  Fungitell: >500 (02-11-20 @ 11:21)  Fungitell: <31 (02-04-20 @ 04:40)  Streptococcus Pneumoniae Ag Urine: Negative (02-02-20 @ 11:00)  MRSA PCR Result.: Positive (02-01-20 @ 20:40)  Procalcitonin, Serum: 1.86 (02-01-20 @ 20:30)  Procalcitonin, Serum: 1.35 (02-01-20 @ 11:17)  Fungitell: 49 (02-01-20 @ 11:17)  Procalcitonin, Serum: 0.77 (02-01-20 @ 04:46)  Rapid RVP Result: Detected (01-31-20 @ 16:24)  Legionella Antigen, Urine: Negative (01-31-20 @ 15:36)  Streptococcus Pneumoniae Ag Urine: Negative (01-31-20 @ 15:36)  Legionella Antigen, Urine: Negative (01-20-20 @ 02:50)  Procalcitonin, Serum: 0.05 (01-16-20 @ 11:30)  MRSA PCR Result.: Positive (01-14-20 @ 13:59)  Flu A Result: Negative (01-13-20 @ 13:10)  Flu B Result: Negative (01-13-20 @ 13:10)  RSV Result: Negative (01-13-20 @ 13:10)  MRSA PCR Result.: Negative (08-13-19 @ 08:46)      RADIOLOGY & ADDITIONAL TESTS:  I have personally reviewed the last available Chest xray  CXR  Xray Chest 1 View- PORTABLE-Urgent:   EXAM:  XR CHEST PORTABLE URGENT 1V            PROCEDURE DATE:  02/26/2020            INTERPRETATION:  Clinical History / Reason for exam: Cough    Comparison : Chest radiograph 2/26/2020 at 2:59 AM.    Technique/Positioning: Frontal radiograph of the chest.    Findings:    Support devices: New ET tube terminating above the baljinder. Enteric tube courses below left hemidiaphragm.    Cardiac/mediastinum/hilum: Stable.    Lung parenchyma/Pleura: Unchanged bilateral interstitial opacities. Bilateral pleural plaques. No pneumothorax.    Skeleton/soft tissues: Stable.    Impression:      Unchanged bilateral interstitial opacities.                      CASIMIRO MCCRAY M.D., ATTENDING RADIOLOGIST  This document has been electronically signed. Feb 26 2020 11:51AM             (02-26-20 @ 11:25)      CT      CARDIOLOGY TESTING  Transthoracic Echocardiogram:    EXAM:  2-D ECHO (TTE) COMPLETE        PROCEDURE DATE:  02/26/2020      INTERPRETATION:  REPORT:    TRANSTHORACIC ECHOCARDIOGRAM REPORT         Patient Name:   DONI PATRICK Accession #: [id number]  Medical Rec #:  [id number]     Height:      64.0 in 162.6 cm  YOB: 1945    Weight:      181.0 lb 82.10 kg  Patient Age:    75 years     BSA:         1.87 m²  Patient Gender: F            BP:          106/63 mmHg       Date of Exam:        2/26/2020 4:09:11 PM  Referring Physician: [id number] [facility]  Sonographer:         Peter Peterson  Reading Physician:   Alexander Alex M.D.    Procedure:   2D Echo/Doppler/Color Doppler Complete.  Indications: I26.99 - Other Pulmonary Embolism without Acute Cor Pulmonale  Diagnosis:   Other pulmonary embolism without acute cor pulmonale - I26.99         Summary:   1. LV Ejection Fraction by Caballero's Method with a biplane EF of 70 %.   2. Moderate concentric left ventricular hypertrophy.   3. Spectral Doppler shows impaired relaxation pattern of left ventricular myocardial filling (Grade I diastolic dysfunction).   4. Mild mitral valve regurgitation.   5. Sclerotic aortic valve with normal opening.   6. Estimated pulmonary artery systolic pressure is 36.2 mmHg assuming a right atrial pressure of 5 mmHg, which is consistent with borderline pulmonary hypertension.    PHYSICIAN INTERPRETATION:  Left Ventricle: The left ventricular internal cavity size is normal. There is moderate concentric left ventricular hypertrophy. Spectral Doppler shows impaired relaxation pattern of left ventricular myocardial filling (Grade I diastolic dysfunction).  Right Ventricle: Normal right ventricular size and function.  Left Atrium: Normal left atrial size.  Right Atrium: Normal right atrial size.  Pericardium: There is no evidence of pericardial effusion.  Mitral Valve: Structurally normal mitral valve, with normal leaflet excursion. The mitral valve is normal in structure. Mild mitral valve regurgitation is seen.  Tricuspid Valve: Structurally normal tricuspid valve, with normal leaflet excursion. The tricuspid valve is normal in structure. Mild tricuspid regurgitation is visualized. Estimated pulmonary artery systolic pressure is 36.2 mmHg assuming a right atrial pressure of 5 mmHg, which is consistent with borderline pulmonary hypertension.  Aortic Valve: The aortic valve is trileaflet. No evidence of aortic stenosis. Sclerotic aortic valve with normal opening. Trivial aortic valve regurgitation is seen.  Pulmonic Valve: Structurally normal pulmonic valve, with normal leaflet excursion. The pulmonic valve is normal. Trace pulmonic valve regurgitation.  Aorta: The aortic root and ascending aorta are structurally normal, with no evidence of dilitation.  Pulmonary Artery: The main pulmonary artery is normal in size.       2D AND M-MODE MEASUREMENTS (normal ranges within parentheses):  Left Ventricle:                  Normal   Aorta/Left Atrium:             Normal  IVSd (2D):              1.51 cm (0.7-1.1) AoV Cusp Separation: 1.74 cm (1.5-2.6)  LVPWd (2D):             1.51 cm (0.7-1.1) Left Atrium (Mmode): 2.63 cm (1.9-4.0)  LVIDd (2D):             3.22 cm (3.4-5.7) Right Ventricle:  LVIDs (2D):             1.90 cm           RVd (2D):        2.29 cm  LV FS (2D):             41.1%   (>25%)  Relative Wall Thickness  0.94    (<0.42)    SPECTRAL DOPPLER ANALYSIS:  LV DIASTOLIC FUNCTION:  MV Peak E: 0.60 m/s Decel Time: 229 msec  MV Peak A: 0.88 m/s  E/A Ratio: 0.68    Aortic Valve:  AoV VMax:    1.32 m/s AoV Area, Vmax: 2.41 cm² Vmax Indx: 1.29 cm²/m²  AoV Pk Grad: 7.0 mmHg    LVOT Vmax: 1.02 m/s  LVOT VTI:  0.20 m  LVOT Diam: 2.00 cm    Mitral Valve:  MV P1/2 Time: 66.49 msec  MV Area, PHT: 3.31 cm²    Tricuspid Valve and PA/RV Systolic Pressure: TR Max Velocity: 2.79 m/s RA Pressure: 5 mmHg RVSP/PASP: 36.2 mmHg       [id number] Alexander Royzman M.D., Electronically signed on 2/26/2020 at 4:20:25 PM              *** Final ***                    ALEXANDER ALEX MD  This document has been electronically signed. Feb 26 2020  4:09PM             (02-26-20 @ 16:09)  12 Lead ECG:   Ventricular Rate 127 BPM    Atrial Rate 127 BPM    P-R Interval 128 ms    QRS Duration 80 ms    Q-T Interval 300 ms    QTC Calculation(Bezet) 436 ms    P Axis 41 degrees    R Axis 1 degrees    T Axis 32 degrees    Diagnosis Line Sinus tachycardia with Premature atrial complexes  Possible Left atrial enlargement  Nonspecific ST abnormality  Abnormal ECG    Confirmed by Kelsey MOLINA Mary ([id number]) on 2/24/2020 8:03:09 AM (02-21-20 @ 18:39)      MEDICATIONS  albuterol/ipratropium for Nebulization 3  amLODIPine   Tablet 10  calcitriol  Solution 0.25  calcium carbonate    500 mG (Tums) Chewable 3  caspofungin IVPB 50  chlorhexidine 4% Liquid 1  cyanocobalamin 1000  dexMEDEtomidine Infusion 0.2  dextrose 5%. 1000  dextrose 50% Injectable 12.5  dextrose 50% Injectable 25  dextrose 50% Injectable 25  fentaNYL   Infusion. 0.5  gabapentin 300  influenza   Vaccine 0.5  insulin glargine Injectable (LANTUS) 18  insulin lispro (HumaLOG) corrective regimen sliding scale   insulin lispro Injectable (HumaLOG) 6  metoprolol tartrate 50  midazolam Infusion 0.02  montelukast 10  multivitamin/minerals 1  mupirocin 2% Ointment 1  norepinephrine Infusion 0.05  pantoprazole   Suspension 40      WEIGHT  Weight (kg): 72.2 (02-26-20 @ 11:30)  Creatinine, Serum: 0.7 mg/dL (02-27-20 @ 04:30)  Creatinine, Serum: 0.7 mg/dL (02-26-20 @ 08:39)      ANTIBIOTICS:  caspofungin IVPB 50 milliGRAM(s) IV Intermittent every 24 hours      All available historical records have been reviewed

## 2020-02-27 NOTE — PROGRESS NOTE ADULT - SUBJECTIVE AND OBJECTIVE BOX
PATIENT:  DONI PATRICK  [id number]    CHIEF COMPLAINT:  Patient is a 75y old  Female who presents with a chief complaint of SOB and desaturation (2020 09:50)      INTERVAL HISTORYOVERNIGHT EVENTS:          MEDICATIONS:  MEDICATIONS  (STANDING):  albuterol/ipratropium for Nebulization 3 milliLiter(s) Nebulizer every 6 hours  calcitriol  Solution 0.25 MICROGram(s) Oral daily  calcium carbonate    500 mG (Tums) Chewable 3 Tablet(s) Chew every 8 hours  caspofungin IVPB 50 milliGRAM(s) IV Intermittent every 24 hours  chlorhexidine 4% Liquid 1 Application(s) Topical <User Schedule>  cyanocobalamin 1000 MICROGram(s) Oral daily  dextrose 5%. 1000 milliLiter(s) (50 mL/Hr) IV Continuous <Continuous>  dextrose 50% Injectable 12.5 Gram(s) IV Push once  dextrose 50% Injectable 25 Gram(s) IV Push once  dextrose 50% Injectable 25 Gram(s) IV Push once  fentaNYL   Infusion. 0.5 MICROgram(s)/kG/Hr (3.75 mL/Hr) IV Continuous <Continuous>  gabapentin 300 milliGRAM(s) Oral at bedtime  influenza   Vaccine 0.5 milliLiter(s) IntraMuscular once  insulin glargine Injectable (LANTUS) 18 Unit(s) SubCutaneous at bedtime  insulin lispro (HumaLOG) corrective regimen sliding scale   SubCutaneous every 4 hours  insulin lispro Injectable (HumaLOG) 6 Unit(s) SubCutaneous three times a day before meals  meropenem  IVPB      meropenem  IVPB 1000 milliGRAM(s) IV Intermittent every 8 hours  methylPREDNISolone sodium succinate Injectable 60 milliGRAM(s) IV Push daily  midazolam Infusion 0.02 mG/kG/Hr (1.444 mL/Hr) IV Continuous <Continuous>  montelukast 10 milliGRAM(s) Oral at bedtime  multivitamin/minerals 1 Tablet(s) Oral daily  mupirocin 2% Ointment 1 Application(s) Topical two times a day  norepinephrine Infusion 0.05 MICROgram(s)/kG/Min (6.769 mL/Hr) IV Continuous <Continuous>  pantoprazole   Suspension 40 milliGRAM(s) Oral before breakfast  sodium chloride 0.9% Bolus 250 milliLiter(s) IV Bolus once    MEDICATIONS  (PRN):  acetaminophen   Tablet .. 650 milliGRAM(s) Oral every 6 hours PRN Temp greater or equal to 38C (100.4F)  dextrose 40% Gel 15 Gram(s) Oral once PRN Blood Glucose LESS THAN 70 milliGRAM(s)/deciliter  glucagon  Injectable 1 milliGRAM(s) IntraMuscular once PRN Glucose LESS THAN 70 milligrams/deciliter      ALLERGIES:  Allergies    No Known Allergies    Intolerances        OBJECTIVE:  ICU Vital Signs Last 24 Hrs  T(C): 37.1 (2020 08:00), Max: 37.1 (2020 08:00)  T(F): 98.8 (2020 08:00), Max: 98.8 (2020 08:00)  HR: 94 (2020 11:30) (80 - 140)  BP: 98/50 (2020 11:30) (55/45 - 196/96)  BP(mean): 69 (2020 11:30) (50 - 147)  ABP: --  ABP(mean): --  RR: 36 (2020 11:30) (15 - 71)  SpO2: 98% (2020 11:30) (65% - 100%)    Mode: AC/ CMV (Assist Control/ Continuous Mandatory Ventilation)  RR (machine): 16  TV (machine): 450  FiO2: 40  PEEP: 5  ITime: 1  MAP: 14  PIP: 23    Adult Advanced Hemodynamics Last 24 Hrs  CVP(mm Hg): --  CVP(cm H2O): --  CO: --  CI: --  PA: --  PA(mean): --  PCWP: --  SVR: --  SVRI: --  PVR: --  PVRI: --  CAPILLARY BLOOD GLUCOSE      POCT Blood Glucose.: 223 mg/dL (2020 06:06)  POCT Blood Glucose.: 251 mg/dL (2020 04:56)  POCT Blood Glucose.: 175 mg/dL (2020 23:50)  POCT Blood Glucose.: 143 mg/dL (2020 22:39)  POCT Blood Glucose.: 134 mg/dL (2020 20:40)  POCT Blood Glucose.: 203 mg/dL (2020 16:01)  POCT Blood Glucose.: 320 mg/dL (2020 12:38)    CAPILLARY BLOOD GLUCOSE      POCT Blood Glucose.: 223 mg/dL (2020 06:06)    I&O's Summary    2020 07:01  -  2020 07:00  --------------------------------------------------------  IN: 2201.2 mL / OUT: 1890 mL / NET: 311.2 mL    2020 07:01  -  2020 11:59  --------------------------------------------------------  IN: 232.4 mL / OUT: 200 mL / NET: 32.4 mL      Daily     Daily Weight in k.3 (2020 00:00)    PHYSICAL EXAMINATION:  General: intubated, sedated  HEENT: LUKE  Neurology: sedated  Respiratory: b/l clear  CV: tachy  Abdominal: Soft, ND  Extremities: peripheral edema  Incisions:   Tubes:    LABS:  ABG - ( 2020 04:04 )  pH, Arterial: 7.42  pH, Blood: x     /  pCO2: 44    /  pO2: 97    / HCO3: 29    / Base Excess: 4.1   /  SaO2: 98                                      7.7    2.79  )-----------( 54       ( 2020 04:30 )             24.1         148<H>  |  107  |  41<H>  ----------------------------<  262<H>  3.5   |  28  |  0.7    Ca    7.3<L>      2020 04:30  Mg     1.9         TPro  4.1<L>  /  Alb  1.8<L>  /  TBili  0.7  /  DBili  0.4<H>  /  AST  43<H>  /  ALT  67<H>  /  AlkPhos  169<H>      LIVER FUNCTIONS - ( 2020 04:30 )  Alb: 1.8 g/dL / Pro: 4.1 g/dL / ALK PHOS: 169 U/L / ALT: 67 U/L / AST: 43 U/L / GGT: x             Creatine Kinase, Serum: 118 U/L ( @ 10:12)  CKMB Units: 3.7 ng/mL ( @ 10:12)  Troponin T, Serum: 0.12 ng/mL ( @ 10:12)    CARDIAC MARKERS ( 2020 10:12 )  x     / 0.12 ng/mL / 118 U/L / x     / 3.7 ng/mL          TELEMETRY:     EKG:     IMAGING:

## 2020-02-27 NOTE — CONSULT NOTE ADULT - SUBJECTIVE AND OBJECTIVE BOX
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  Patient was admitted on 1/31/2020 with shortness of breath, hemoptysis and cough.  She was found to be positive for influenza A, developed ARDS and was intubated.  She was found to have alveolar hemorrhage on bronchoscopy and was treated for possible superimposed atypical PNA, as well as candidemia (likely secondary to infected arterial line).  She also developed REJI on CKD III, likely ATN secondary to vancomycin.  Patient was found to have a left saphenous vein DVT, as well as possible PE, while off coumadin secondary to finding of alveolar hemorrhage.  She also developed BRBPR and underwent a flexible sigmoidoscopy, where two rectal ulcers were detected, one of which was clipped.  Patient was downgraded to the general medical floor on 2/25/2020 and upgraded on 2/26/2020 after being intubated for respiratory distress.  IVC filter was placed by interventional radiology on 2/26/2020.    Hematology was consulted secondary to pancytopenia.  Patient's admission CBC revealed a WBC of 19.4 (neutrophil predominant), hemoglobin of 14.7 with normal MCV, and platelet count of 279.  She had persistent leukocytosis.  On 2/3/2020, hemoglobin began to drop.  CBC revealed WBC of 28.61, hemoglobin of 11.5, and platelet of 152.  Platelet count subsequently began to drop (102 on 2/4/2020).  WBC went from elevated to decreased on 2/24/2020-WBC of 4.35 with ANC of 3,790, hemoglobin of 9.6, and platelet count of 82.  Latest CBC on 2/27/2020 revealed WBC of 2.79, hemoglobin of 7.7 with MCV of 90.3, and platelet count of 54.    Patient has received 6 units of PRBCs total throughout her hospital stay.  She is currently on meropenem and casofungin and was previously on levaquin, zosyn, vancomycin, linezolid, fluconazole, and cefepime.      ROS:  Negative except for: Unable to obtain.  Patient is intubated.    PAST MEDICAL & SURGICAL HISTORY:  Prothrombin gene mutation  Autoimmune hepatitis  Other chronic pulmonary embolism without acute cor pulmonale  Essential hypertension  Autoimmune hepatitis treated with steroids  Asthma  DVT (deep venous thrombosis)  S/P debridement  History of back surgery      SOCIAL HISTORY: Former smoker.    FAMILY HISTORY:  No pertinent family history in first degree relatives      MEDICATIONS  (STANDING):  albuterol/ipratropium for Nebulization 3 milliLiter(s) Nebulizer every 6 hours  calcitriol  Solution 0.25 MICROGram(s) Oral daily  calcium carbonate    500 mG (Tums) Chewable 3 Tablet(s) Chew every 8 hours  caspofungin IVPB 50 milliGRAM(s) IV Intermittent every 24 hours  chlorhexidine 4% Liquid 1 Application(s) Topical <User Schedule>  cyanocobalamin 1000 MICROGram(s) Oral daily  dextrose 5%. 1000 milliLiter(s) (50 mL/Hr) IV Continuous <Continuous>  dextrose 50% Injectable 12.5 Gram(s) IV Push once  dextrose 50% Injectable 25 Gram(s) IV Push once  dextrose 50% Injectable 25 Gram(s) IV Push once  fentaNYL   Infusion. 0.5 MICROgram(s)/kG/Hr (3.75 mL/Hr) IV Continuous <Continuous>  gabapentin 300 milliGRAM(s) Oral at bedtime  influenza   Vaccine 0.5 milliLiter(s) IntraMuscular once  insulin glargine Injectable (LANTUS) 18 Unit(s) SubCutaneous at bedtime  insulin lispro (HumaLOG) corrective regimen sliding scale   SubCutaneous every 4 hours  insulin lispro Injectable (HumaLOG) 6 Unit(s) SubCutaneous three times a day before meals  meropenem  IVPB 1000 milliGRAM(s) IV Intermittent once  meropenem  IVPB      meropenem  IVPB 1000 milliGRAM(s) IV Intermittent every 8 hours  methylPREDNISolone sodium succinate Injectable 60 milliGRAM(s) IV Push daily  metoprolol tartrate 50 milliGRAM(s) Oral two times a day  midazolam Infusion 0.02 mG/kG/Hr (1.444 mL/Hr) IV Continuous <Continuous>  montelukast 10 milliGRAM(s) Oral at bedtime  multivitamin/minerals 1 Tablet(s) Oral daily  mupirocin 2% Ointment 1 Application(s) Topical two times a day  norepinephrine Infusion 0.05 MICROgram(s)/kG/Min (6.769 mL/Hr) IV Continuous <Continuous>  pantoprazole   Suspension 40 milliGRAM(s) Oral before breakfast  sodium chloride 0.9% Bolus 250 milliLiter(s) IV Bolus once    MEDICATIONS  (PRN):  acetaminophen   Tablet .. 650 milliGRAM(s) Oral every 6 hours PRN Temp greater or equal to 38C (100.4F)  dextrose 40% Gel 15 Gram(s) Oral once PRN Blood Glucose LESS THAN 70 milliGRAM(s)/deciliter  glucagon  Injectable 1 milliGRAM(s) IntraMuscular once PRN Glucose LESS THAN 70 milligrams/deciliter  oxyCODONE    IR 5 milliGRAM(s) Oral every 6 hours PRN breakthrough pain    Height (cm): 160 (02-26-20 @ 11:30)  Weight (kg): 72.2 (02-26-20 @ 11:30)  BMI (kg/m2): 28.2 (02-26-20 @ 11:30)  BSA (m2): 1.75 (02-26-20 @ 11:30)  Allergies    No Known Allergies    Intolerances      Vital Signs Last 24 Hrs  T(C): 35.4 (27 Feb 2020 04:00), Max: 37 (26 Feb 2020 12:00)  T(F): 95.8 (27 Feb 2020 04:00), Max: 98.6 (26 Feb 2020 12:00)  HR: 88 (27 Feb 2020 08:15) (80 - 140)  BP: 89/46 (27 Feb 2020 07:00) (55/45 - 196/96)  BP(mean): 62 (27 Feb 2020 07:00) (50 - 147)  RR: 29 (27 Feb 2020 07:00) (15 - 71)  SpO2: 100% (27 Feb 2020 08:15) (65% - 100%)    PHYSICAL EXAM  General: adult in NAD, appears critically ill  HEENT: oropharyngeally intubated  CV: +S1, +S2  Lungs: positive air movement b/l ant lungs  Abdomen: soft  Ext: L femoral line in place.  Site appears clean and dry.  Skin: Multiple ecchymoses on extremities.  Dressings on B/L LE wounds.  Neuro: sedated      LABS:                          7.7    2.79  )-----------( 54       ( 27 Feb 2020 04:30 )             24.1         Mean Cell Volume : 90.3 fL  Mean Cell Hemoglobin : 28.8 pg  Mean Cell Hemoglobin Concentration : 32.0 g/dL  Auto Neutrophil # : 2.27 K/uL  Auto Lymphocyte # : 0.30 K/uL  Auto Monocyte # : 0.10 K/uL  Auto Eosinophil # : 0.03 K/uL  Auto Basophil # : 0.01 K/uL  Auto Neutrophil % : 81.2 %  Auto Lymphocyte % : 10.8 %  Auto Monocyte % : 3.6 %  Auto Eosinophil % : 1.1 %  Auto Basophil % : 0.4 %      Serial CBC's  02-27 @ 04:30  Hct-24.1 / Hgb-7.7 / Plat-54 / RBC-2.67 / WBC-2.79  Serial CBC's  02-26 @ 08:39  Hct-25.9 / Hgb-8.7 / Plat-55 / RBC-2.92 / WBC-2.09  Serial CBC's  02-25 @ 06:46  Hct-26.8 / Hgb-8.5 / Plat-70 / RBC-2.99 / WBC-2.37  Serial CBC's  02-25 @ 01:10  Hct-27.3 / Hgb-8.9 / Plat-65 / RBC-3.03 / WBC-2.72  Serial CBC's  02-24 @ 04:52  Hct-28.6 / Hgb-9.6 / Plat-82 / RBC-3.21 / WBC-4.35  Serial CBC's  02-23 @ 19:06  Hct-30.1 / Hgb-10.0 / Plat-91 / RBC-3.44 / WBC-6.55      02-27    148<H>  |  107  |  41<H>  ----------------------------<  262<H>  3.5   |  28  |  0.7    Ca    7.3<L>      27 Feb 2020 04:30  Mg     1.9     02-27    TPro  4.1<L>  /  Alb  1.8<L>  /  TBili  0.7  /  DBili  0.4<H>  /  AST  43<H>  /  ALT  67<H>  /  AlkPhos  169<H>  02-27

## 2020-02-27 NOTE — PROCEDURE NOTE - NSPOSTPRCRAD_GEN_A_CORE
post-procedure radiography performed/central line located in the/central line located in the superior vena cava/line adjusted to depth of insertion/depth of insertion
central line located in the superior vena cava/no pneumothorax
post procedure US noted TLC in femoral vein
will follow up/post-procedure radiography performed

## 2020-02-27 NOTE — CHART NOTE - NSCHARTNOTEFT_GEN_A_CORE
Registered Dietitian Follow-Up    ***Scroll to the bottom for RD recommendation***    Patient Profile Reviewed                           Yes [x]   No []  Nutrition History Previously Obtained        Yes [x]  No []          PERTINENT SUBJECTIVE INFORMATION (LATEST AS OF TODAY):  - FAMILY at bedside.   - pt is re-intubated to ventilator. on devaughn hugger. IV fentanyl, levo, versed. Ve 12.0, BP 96/52, MAP 70. Temp 35.4c.  - per RN, TF on hold for today possibly test/procedures.        PERTINENT MEDICAL INFORMATIONS:  (1) p/w SOB+ desat.   (2) Acute hypoxic resp/ ARDS   (3) DAH resolving. FLU A treated.   (4) REJI improving.   (5) Failing SLP. last FEES was 2/25/20.   (6) F/u Renal lytes. ID consulted.   (7) Candidemia caspofungin. f/u electrolytes.           DIET ORDER:   Osmolite 1.5 at 240cc q6hr + Prosource TF x2 (NGT) = 1460 kcal/ 71g protein/ 96508wn K/ 730mL free water        ANTHROPOMETRICS:  - Ht.  162.6cm  - Wt.   (2/1): 74.8kg   (2/2): 74.3kg  (2/3): 81.5kg  (2/4): 78.4kg  (2/8): 87.7kg  (2/10): 86.7kg  (2/16): 86kg   (2/19): 87.9kg  (2/21): 81.5kg  (2/24): 74.5kg  (2/27): 71.3kg - pt has edema ongoing, and weight appears to be trending downward. It is possible that pt is losing weight. will monitor  - BMI. 28- 30.8  - IBW. 54.5kg       PERTINENT LAB DATA:  2/27: h/h 7.7/24.1, Na 148, BUN 41, Glucose 262, Ca 7.3, Albumin 1.8, LFT elevated  PERTINENT MEDS:   abx, insulin, methylprednisolone, fentanyl, versed, oxy, adriano carbonate, acetaminophen, b12, MVI.       PHYSICAL FINDINGS  - APPEARANCE:        intubated to ventilator, 2+ generalized edema  - GI FUNCTION:       LBM 2/26 per EMR  - TUBES:                     NGT  - ORAL/MOUTH:      NPO, failed SLP 2/24  - SKIN:                        ecchymosis        NUTRITION REQUIREMENTS  WEIGHT USED:                          Ht: 162.6cm, Wt: 74.8kg (more likely pt's wt PTA, BMI: 28.5)  ESTIMATED ENERGY NEEDS:       CONTINUE [  ]      ADJUST [ x ]  - now intubated 2/27    ESTIMATED ENERGY NEEDS:         1235 kcal/day ([id number])  ESTIMATED PROTEIN NEEDS:         g/day (1.2-1.4 g/kg of ABW) - renal status fair v.s. improving, will monitor  ESTIMATED FLUID NEEDS:             fluid per ICU team    CURRENT NUTRIENT NEEDS:    NPO TODAY 2/27          [  x] PREVIOUS NUTRITION DIAGNOSIS:    (1) Inadequate energy intake  - ONGOING            [x  ] ONGOING        [  ] RESOLVED            PATIENT INTERVENTION:    [  ] ORAL        [ x] EN/TF     GOAL/EXPECTED OUTCOME:     pt to meet and tolerate >85% of estimated kcal/protein via TF upon f/u in 4 days.   INDICATOR/MONITORING:       RD to monitor diet order, energy intake, body composition, nutrition focused physical findings (EN tolerance, s/s, appetite, renal profile)  NUTRITION INTERVENTION:        Enteral nutrition,       RECS: (1) Now that pt got re-intubated, needs for calorie and protein have been changed. Therefore, if pt to resume TF later today, please change it to OSMOLITE 1.5 at 30cc/hr (continuous feeding) with No-Carb Prosource TF x4/day. This regimen at GOAL gives a total of 1225 kcal/ 89g protein/ 547mL free water, additional flushes per ICU team.

## 2020-02-27 NOTE — PROGRESS NOTE ADULT - SUBJECTIVE AND OBJECTIVE BOX
OVERNIGHT EVENTS: still intubated, ventilated, on levophed 0.15, fentanyl, versed, no other drips, no diarrhea    Vital Signs Last 24 Hrs  T(C): 35.4 (27 Feb 2020 04:00), Max: 37 (26 Feb 2020 12:00)  T(F): 95.8 (27 Feb 2020 04:00), Max: 98.6 (26 Feb 2020 12:00)  HR: 86 (27 Feb 2020 07:41) (80 - 140)  BP: 89/46 (27 Feb 2020 07:00) (55/45 - 196/96)  BP(mean): 62 (27 Feb 2020 07:00) (50 - 147)  RR: 29 (27 Feb 2020 07:00) (15 - 71)  SpO2: 100% (27 Feb 2020 07:41) (65% - 100%)    PHYSICAL EXAMINATION:    GENERAL: sedated, not following commands, chronically ill looking    HEENT: Head is normocephalic and atraumatic. Extraocular muscles are intact. Mucous membranes are moist.    NECK: Supple.    LUNGS: bl crackles    HEART: Regular rate and rhythm without murmur.    ABDOMEN: Soft, nontender, and nondistended.      EXTREMITIES: Without any cyanosis, clubbing, rash, lesions or edema.    NEUROLOGIC: Grossly intact.    SKIN: diffuse blisters    LABS:                        7.7    2.79  )-----------( 54       ( 27 Feb 2020 04:30 )             24.1     02-27    148<H>  |  107  |  41<H>  ----------------------------<  262<H>  3.5   |  28  |  0.7    Ca    7.3<L>      27 Feb 2020 04:30  Mg     1.9     02-27    TPro  4.1<L>  /  Alb  1.8<L>  /  TBili  0.7  /  DBili  0.4<H>  /  AST  43<H>  /  ALT  67<H>  /  AlkPhos  169<H>  02-27        ABG - ( 27 Feb 2020 04:04 )  pH, Arterial: 7.42  pH, Blood: x     /  pCO2: 44    /  pO2: 97    / HCO3: 29    / Base Excess: 4.1   /  SaO2: 98   . LA 3.1      16/450/50/7.5                      02-26-20 @ 07:01  -  02-27-20 @ 07:00  --------------------------------------------------------  IN: 2201.2 mL / OUT: 1890 mL / NET: 311.2 mL        MICROBIOLOGY:      MEDICATIONS  (STANDING):  albuterol/ipratropium for Nebulization 3 milliLiter(s) Nebulizer every 6 hours  amLODIPine   Tablet 10 milliGRAM(s) Oral daily  calcitriol  Solution 0.25 MICROGram(s) Oral daily  calcium carbonate    500 mG (Tums) Chewable 3 Tablet(s) Chew every 8 hours  caspofungin IVPB 50 milliGRAM(s) IV Intermittent every 24 hours  chlorhexidine 4% Liquid 1 Application(s) Topical <User Schedule>  cyanocobalamin 1000 MICROGram(s) Oral daily  dexMEDEtomidine Infusion 0.2 MICROgram(s)/kG/Hr (3.75 mL/Hr) IV Continuous <Continuous>  dextrose 5%. 1000 milliLiter(s) (50 mL/Hr) IV Continuous <Continuous>  dextrose 50% Injectable 12.5 Gram(s) IV Push once  dextrose 50% Injectable 25 Gram(s) IV Push once  dextrose 50% Injectable 25 Gram(s) IV Push once  fentaNYL   Infusion. 0.5 MICROgram(s)/kG/Hr (3.75 mL/Hr) IV Continuous <Continuous>  gabapentin 300 milliGRAM(s) Oral at bedtime  influenza   Vaccine 0.5 milliLiter(s) IntraMuscular once  insulin glargine Injectable (LANTUS) 18 Unit(s) SubCutaneous at bedtime  insulin lispro (HumaLOG) corrective regimen sliding scale   SubCutaneous every 4 hours  insulin lispro Injectable (HumaLOG) 6 Unit(s) SubCutaneous three times a day before meals  metoprolol tartrate 50 milliGRAM(s) Oral two times a day  midazolam Infusion 0.02 mG/kG/Hr (1.444 mL/Hr) IV Continuous <Continuous>  montelukast 10 milliGRAM(s) Oral at bedtime  multivitamin/minerals 1 Tablet(s) Oral daily  mupirocin 2% Ointment 1 Application(s) Topical two times a day  norepinephrine Infusion 0.05 MICROgram(s)/kG/Min (6.769 mL/Hr) IV Continuous <Continuous>  pantoprazole   Suspension 40 milliGRAM(s) Oral before breakfast    MEDICATIONS  (PRN):  acetaminophen   Tablet .. 650 milliGRAM(s) Oral every 6 hours PRN Temp greater or equal to 38C (100.4F)  dextrose 40% Gel 15 Gram(s) Oral once PRN Blood Glucose LESS THAN 70 milliGRAM(s)/deciliter  glucagon  Injectable 1 milliGRAM(s) IntraMuscular once PRN Glucose LESS THAN 70 milligrams/deciliter  oxyCODONE    IR 5 milliGRAM(s) Oral every 6 hours PRN breakthrough pain      RADIOLOGY & ADDITIONAL STUDIES:

## 2020-02-27 NOTE — CONSULT NOTE ADULT - ATTENDING COMMENTS
The situation was discussed with Dr. JENNY Wise (Hem-Onc fellow). I agree with her note above. Situation discussed with her. In addition, would suggest hemolytic work up. Right now, there is nothing to suggest mineral or vitamin deficiencies especially that her admission hemogram was not suggestive of any such condition.  Spoke also with ICU staff. At this time, I don't see any reason for bone marrow studies.  If the WBC count continues to drop, consider Neulasta or equivalent. The situation was discussed with Dr. JENNY Wise (Hem-Onc fellow). I agree with her note above. Situation discussed with her. In addition, would suggest hemolytic work up. Right now, there is nothing to suggest mineral or vitamin deficiencies especially that her admission hemogram was not suggestive of any such condition. All the other recommendations/plan are outlined above.  At this time, I don't see any reason for bone marrow studies.  If the WBC count continues to drop, consider Neulasta or equivalent.

## 2020-02-28 NOTE — CONSULT NOTE ADULT - CONSULT REASON
arm and leg wounds
r/o endophthalmitis in setting of candidemia
Debilitation
IVC filter placement
PNA
Pancytopenia
REJI
Respiratory distress
fresh blood per rectum
fresh blood per rectum/ blood clots
spontaneous awake trial and EEG
worsening pulmonary failure

## 2020-02-28 NOTE — PROGRESS NOTE ADULT - SUBJECTIVE AND OBJECTIVE BOX
DONI PATRICK  75y, Female  Allergy: No Known Allergies      LOS  28d    CHIEF COMPLAINT: SOB and desaturation (28 Feb 2020 08:10)      INTERVAL EVENTS/HPI  - No acute events overnight, BAL with kleb. CXR increased opacities   - T(F): , Max: 100.1 (02-28-20 @ 00:00)  - WBC Count: 6.50 (02-28-20 @ 05:00)  WBC Count: 4.40 (02-27-20 @ 18:34)  - Creatinine, Serum: 0.7 (02-28-20 @ 05:00)  Creatinine, Serum: 0.7 (02-27-20 @ 18:34)       ROS  unable to obtain history secondary to patient's mental status and/or sedation     VITALS:  T(F): 100.1, Max: 100.1 (02-28-20 @ 00:00)  HR: 76  BP: 95/57  RR: 18Vital Signs Last 24 Hrs  T(C): 37.8 (28 Feb 2020 00:00), Max: 37.8 (28 Feb 2020 00:00)  T(F): 100.1 (28 Feb 2020 00:00), Max: 100.1 (28 Feb 2020 00:00)  HR: 76 (28 Feb 2020 09:00) (72 - 120)  BP: 95/57 (28 Feb 2020 09:00) (61/41 - 175/94)  BP(mean): 70 (28 Feb 2020 09:00) (51 - 145)  RR: 18 (28 Feb 2020 09:00) (12 - 36)  SpO2: 100% (28 Feb 2020 09:00) (87% - 100%)    PHYSICAL EXAM:  Gen: intubated  HEENT: Normocephalic, atraumatic  Neck: supple, no lymphadenopathy  CV: Regular rate & regular rhythm  Lungs: decreased BS at bases, no fremitus  Abdomen: Soft, BS present  Ext: Warm, well perfused anasarca,  LLE ulcer healing   Neuro: sedated  Skin: no rash, no erythema,+ ecchymoses  Lines: no phlebitis      FH: Non-contributory  Social Hx: Non-contributory    TESTS & MEASUREMENTS:                        7.1    6.50  )-----------( 63       ( 28 Feb 2020 05:00 )             22.1     02-28    145  |  107  |  40<H>  ----------------------------<  304<H>  4.4   |  26  |  0.7    Ca    7.2<L>      28 Feb 2020 05:00  Mg     1.9     02-27    TPro  4.3<L>  /  Alb  1.9<L>  /  TBili  0.7  /  DBili  x   /  AST  40  /  ALT  61<H>  /  AlkPhos  177<H>  02-28    eGFR if Non African American: 85 mL/min/1.73M2 (02-28-20 @ 05:00)  eGFR if African American: 98 mL/min/1.73M2 (02-28-20 @ 05:00)  eGFR if Non African American: 85 mL/min/1.73M2 (02-27-20 @ 18:34)  eGFR if : 98 mL/min/1.73M2 (02-27-20 @ 18:34)    LIVER FUNCTIONS - ( 28 Feb 2020 05:00 )  Alb: 1.9 g/dL / Pro: 4.3 g/dL / ALK PHOS: 177 U/L / ALT: 61 U/L / AST: 40 U/L / GGT: x               Culture - Acid Fast - Bronchial w/Smear (collected 02-26-20 @ 14:45)  Source: .Bronchial None    Culture - Fungal, Bronchial (collected 02-26-20 @ 14:45)  Source: .Bronchial None  Preliminary Report (02-27-20 @ 08:21):    Testing in progress    Culture - Bronchial (collected 02-26-20 @ 14:45)  Source: .Bronchial None  Gram Stain (02-27-20 @ 07:36):    Moderate polymorphonuclear leukocytes seen per low power field    Rare Squamous epithelial cells seen per low power field    Numerous Gram Negative Rods seen per oil power field  Preliminary Report (02-27-20 @ 20:53):    Numerous Klebsiella pneumoniae    Culture - Blood (collected 02-17-20 @ 05:17)  Source: .Blood None  Final Report (02-22-20 @ 14:00):    No growth at 5 days.    Culture - Blood (collected 02-15-20 @ 04:57)  Source: .Blood None  Final Report (02-20-20 @ 18:01):    No growth at 5 days.    Culture - Sputum (collected 02-14-20 @ 17:00)  Source: .Sputum Sputum  Gram Stain (02-15-20 @ 04:56):    Few Squamous epithelial cells per low power field    Few polymorphonuclear leukocytes per low power field    Few Yeast like cells per oil power field    Few Gram variable coccobacilli per oil power field  Final Report (02-16-20 @ 17:44):    Moderate Methicillin resistant Staphylococcus aureus    Normal Respiratory Nikki present  Organism: Methicillin resistant Staphylococcus aureus (02-16-20 @ 17:44)  Organism: Methicillin resistant Staphylococcus aureus (02-16-20 @ 17:44)      -  Ampicillin/Sulbactam: R <=8/4      -  Cefazolin: R <=4      -  Clindamycin: S <=0.25      -  Erythromycin: R >4      -  Gentamicin: S <=1 Should not be used as monotherapy      -  Linezolid: S 2      -  Oxacillin: R >2      -  Penicillin: R >8      -  RIF- Rifampin: S <=1 Should not be used as monotherapy      -  Tetra/Doxy: S <=1      -  Trimethoprim/Sulfamethoxazole: S <=0.5/9.5      -  Vancomycin: S 2      Method Type: EROS    Culture - Blood (collected 02-14-20 @ 04:30)  Source: .Blood Blood-Peripheral  Final Report (02-19-20 @ 14:00):    No growth at 5 days.    Culture - Other (collected 02-13-20 @ 18:55)  Source: .Other wound  Final Report (02-15-20 @ 19:22):    No growth    Culture - Blood (collected 02-12-20 @ 04:50)  Source: .Blood None  Gram Stain (02-13-20 @ 14:24):    Growth in aerobic bottle: Yeast like cells  Final Report (02-18-20 @ 15:03):    Growth in aerobic bottle: Candida albicans    See previous culture [id number]    Culture - Blood (collected 02-11-20 @ 11:21)  Source: .Blood None  Gram Stain (02-12-20 @ 16:48):    Growth in aerobic bottle: Yeast like cells  Final Report (02-15-20 @ 14:05):    Growth in aerobic bottle: Candida albicans    ***Blood Panel PCR results on this specimen are available    approximately 3 hours after the Gram stain result.***    Gram stain, PCR, and/or culture results may not always    correspond due to difference in methodologies.    ************************************************************    This PCR assay was performed using [employer].    The following targets are tested for: Enterococcus,    vancomycin resistant enterococci, Listeria monocytogenes,    coagulase negative staphylococci, S. aureus,    methicillin resistant S. aureus, Streptococcus agalactiae    (Group B), S. pneumoniae, S. pyogenes (Group A),    Acinetobacter baumannii, Enterobacter cloacae, E. coli,    Klebsiella oxytoca, K. pneumoniae, Proteus sp.,    Serratia marcescens, Haemophilus influenzae,    Neisseria meningitidis, Pseudomonas aeruginosa, Candida    albicans, C. glabrata, C krusei, C parapsilosis,    C. tropicalis and the KPC resistance gene.  Organism: Blood Culture PCR  Candida albicans (02-15-20 @ 14:05)  Organism: Candida albicans (02-15-20 @ 14:05)      -  Fluconazole: S 0.25 Fluconazole = Data established for mucosal disease, and is generally applicable for invasive disease.  Susceptibility is dependent on achieving the maximal possible blood level.  Doses of 400 MG/day or more may be required in adults with normal renalfunction and body habitus.      -  Interpretation: See note This test method is not approved by the FDA and is for research use only.  The performance characteristics of this assay may have been determined by [employer] and [facility], [place], [place]                            N/I - No interpretation available                                                         SDD – Sensitive Dose Dependent      Method Type: YSTMIC  Organism: Blood Culture PCR (02-15-20 @ 14:05)      -  Candida albicans: Detec      Method Type: PCR    Culture - Acid Fast - Bronchial w/Smear (collected 02-03-20 @ 15:00)  Source: .Bronchial None  Preliminary Report (02-12-20 @ 15:05):    No growth at 1 week.    Culture - Fungal, Bronchial (collected 02-03-20 @ 15:00)  Source: .Bronchial None  Preliminary Report (02-12-20 @ 15:02):    No growth    Culture - Bronchial (collected 02-03-20 @ 15:00)  Source: .Bronchial None  Gram Stain (02-04-20 @ 05:52):    No polymorphonuclear cells seen per low power field    No squamous epithelial cells per low power field    No organisms seen  Final Report (02-05-20 @ 19:32):    Normal Respiratory Nikki present    Culture - Blood (collected 01-31-20 @ 09:50)  Source: .Blood Blood  Final Report (02-05-20 @ 23:01):    No growth at 5 days.    Culture - Blood (collected 01-31-20 @ 09:50)  Source: .Blood Blood  Final Report (02-05-20 @ 23:01):    No growth at 5 days.        Lactate, Blood: 3.1 mmol/L (02-28-20 @ 05:00)  Lactate, Blood: 1.5 mmol/L (02-27-20 @ 18:34)  Lactate, Blood: 2.2 mmol/L (02-27-20 @ 14:18)  Lactate, Blood: 2.6 mmol/L (02-27-20 @ 10:12)  Lactate, Blood: 6.1 mmol/L (02-23-20 @ 19:06)      INFECTIOUS DISEASES TESTING  Fungitell: >500 (02-11-20 @ 11:21)  Fungitell: <31 (02-04-20 @ 04:40)  Streptococcus Pneumoniae Ag Urine: Negative (02-02-20 @ 11:00)  MRSA PCR Result.: Positive (02-01-20 @ 20:40)  Procalcitonin, Serum: 1.86 (02-01-20 @ 20:30)  Procalcitonin, Serum: 1.35 (02-01-20 @ 11:17)  Fungitell: 49 (02-01-20 @ 11:17)  Procalcitonin, Serum: 0.77 (02-01-20 @ 04:46)  Rapid RVP Result: Detected (01-31-20 @ 16:24)  Legionella Antigen, Urine: Negative (01-31-20 @ 15:36)  Streptococcus Pneumoniae Ag Urine: Negative (01-31-20 @ 15:36)  Legionella Antigen, Urine: Negative (01-20-20 @ 02:50)  Procalcitonin, Serum: 0.05 (01-16-20 @ 11:30)  MRSA PCR Result.: Positive (01-14-20 @ 13:59)  Flu A Result: Negative (01-13-20 @ 13:10)  Flu B Result: Negative (01-13-20 @ 13:10)  RSV Result: Negative (01-13-20 @ 13:10)  MRSA PCR Result.: Negative (08-13-19 @ 08:46)      RADIOLOGY & ADDITIONAL TESTS:  I have personally reviewed the last available Chest xray  CXR  Xray Chest 1 View- PORTABLE-Urgent:   EXAM:  XR CHEST PORTABLE URGENT 1V            PROCEDURE DATE:  02/27/2020            INTERPRETATION:  CLINICAL INDICATION: Fever, central line placement    COMPARISON: Chest radiograph dated 2/27/2020 performed at 5:40 AM    TECHNIQUE: Frontal radiograph the chest.    FINDINGS:    Support devices: ET tube terminates above the baljinder. Stable enteric tube. New right IJ catheter with tip in the SVC/cavoatrial junction    Cardiac/mediastinum/hilum: Stable.    Lung parenchyma/Pleura: Unchanged.    Skeleton/soft tissues: Stable.    IMPRESSION:    Overall no change since the prior exam. No pneumothorax                      SHYAM PAREDES M.D., ATTENDING RADIOLOGIST  This document has been electronically signed. Feb 27 2020  1:10PM             (02-27-20 @ 13:00)      CT      CARDIOLOGY TESTING  Transthoracic Echocardiogram:    EXAM:  2-D ECHO (TTE) COMPLETE        PROCEDURE DATE:  02/26/2020      INTERPRETATION:  REPORT:    TRANSTHORACIC ECHOCARDIOGRAM REPORT         Patient Name:   DONI PATRICK Accession #: [id number]  [facility] Rec #:  [id number]     Height:      64.0 in 162.6 cm  YOB: 1945    Weight:      181.0 lb 82.10 kg  Patient Age:    75 years     BSA:         1.87 m²  Patient Gender: F            BP:          106/63 mmHg       Date of Exam:        2/26/2020 4:09:11 PM  Referring Physician: [id number] [facility]  Sonographer:         Peter Peterson  Reading Physician:   Alexander Alex M.D.    Procedure:   2D Echo/Doppler/Color Doppler Complete.  Indications: I26.99 - Other Pulmonary Embolism without Acute Cor Pulmonale  Diagnosis:   Other pulmonary embolism without acute cor pulmonale - I26.99         Summary:   1. LV Ejection Fraction by Caballero's Method with a biplane EF of 70 %.   2. Moderate concentric left ventricular hypertrophy.   3. Spectral Doppler shows impaired relaxation pattern of left ventricular myocardial filling (Grade I diastolic dysfunction).   4. Mild mitral valve regurgitation.   5. Sclerotic aortic valve with normal opening.   6. Estimated pulmonary artery systolic pressure is 36.2 mmHg assuming a right atrial pressure of 5 mmHg, which is consistent with borderline pulmonary hypertension.    PHYSICIAN INTERPRETATION:  Left Ventricle: The left ventricular internal cavity size is normal. There is moderate concentric left ventricular hypertrophy. Spectral Doppler shows impaired relaxation pattern of left ventricular myocardial filling (Grade I diastolic dysfunction).  Right Ventricle: Normal right ventricular size and function.  Left Atrium: Normal left atrial size.  Right Atrium: Normal right atrial size.  Pericardium: There is no evidence of pericardial effusion.  Mitral Valve: Structurally normal mitral valve, with normal leaflet excursion. The mitral valve is normal in structure. Mild mitral valve regurgitation is seen.  Tricuspid Valve: Structurally normal tricuspid valve, with normal leaflet excursion. The tricuspid valve is normal in structure. Mild tricuspid regurgitation is visualized. Estimated pulmonary artery systolic pressure is 36.2 mmHg assuming a right atrial pressure of 5 mmHg, which is consistent with borderline pulmonary hypertension.  Aortic Valve: The aortic valve is trileaflet. No evidence of aortic stenosis. Sclerotic aortic valve with normal opening. Trivial aortic valve regurgitation is seen.  Pulmonic Valve: Structurally normal pulmonic valve, with normal leaflet excursion. The pulmonic valve is normal. Trace pulmonic valve regurgitation.  Aorta: The aortic root and ascending aorta are structurally normal, with no evidence of dilitation.  Pulmonary Artery: The main pulmonary artery is normal in size.       2D AND M-MODE MEASUREMENTS (normal ranges within parentheses):  Left Ventricle:                  Normal   Aorta/Left Atrium:             Normal  IVSd (2D):              1.51 cm (0.7-1.1) AoV Cusp Separation: 1.74 cm (1.5-2.6)  LVPWd (2D):             1.51 cm (0.7-1.1) Left Atrium (Mmode): 2.63 cm (1.9-4.0)  LVIDd (2D):             3.22 cm (3.4-5.7) Right Ventricle:  LVIDs (2D):             1.90 cm           RVd (2D):        2.29 cm  LV FS (2D):             41.1%   (>25%)  Relative Wall Thickness  0.94    (<0.42)    SPECTRAL DOPPLER ANALYSIS:  LV DIASTOLIC FUNCTION:  MV Peak E: 0.60 m/s Decel Time: 229 msec  MV Peak A: 0.88 m/s  E/A Ratio: 0.68    Aortic Valve:  AoV VMax:    1.32 m/s AoV Area, Vmax: 2.41 cm² Vmax Indx: 1.29 cm²/m²  AoV Pk Grad: 7.0 mmHg    LVOT Vmax: 1.02 m/s  LVOT VTI:  0.20 m  LVOT Diam: 2.00 cm    Mitral Valve:  MV P1/2 Time: 66.49 msec  MV Area, PHT: 3.31 cm²    Tricuspid Valve and PA/RV Systolic Pressure: TR Max Velocity: 2.79 m/s RA Pressure: 5 mmHg RVSP/PASP: 36.2 mmHg       [id number] Alexander Alex M.D., Electronically signed on 2/26/2020 at 4:20:25 PM              *** Final ***                    ALEXANDER ALEX MD  This document has been electronically signed. Feb 26 2020  4:09PM             (02-26-20 @ 16:09)  12 Lead ECG:   Ventricular Rate 127 BPM    Atrial Rate 127 BPM    P-R Interval 128 ms    QRS Duration 80 ms    Q-T Interval 300 ms    QTC Calculation(Bezet) 436 ms    P Axis 41 degrees    R Axis 1 degrees    T Axis 32 degrees    Diagnosis Line Sinus tachycardia with Premature atrial complexes  Possible Left atrial enlargement  Nonspecific ST abnormality  Abnormal ECG    Confirmed by Coty Cole MDfer ([id number]) on 2/24/2020 8:03:09 AM (02-21-20 @ 18:39)      MEDICATIONS  albuterol/ipratropium for Nebulization 3  calcitriol  Solution 0.25  calcium carbonate    500 mG (Tums) Chewable 3  chlorhexidine 0.12% Liquid 15  chlorhexidine 4% Liquid 1  cyanocobalamin 1000  dextrose 5%. 1000  dextrose 50% Injectable 12.5  dextrose 50% Injectable 25  dextrose 50% Injectable 25  fentaNYL   Infusion. 0.5  gabapentin 300  influenza   Vaccine 0.5  insulin lispro (HumaLOG) corrective regimen sliding scale   meropenem  IVPB   meropenem  IVPB 1000  methylPREDNISolone sodium succinate Injectable 60  midazolam Infusion 0.02  multivitamin/minerals 1  mupirocin 2% Ointment 1  norepinephrine Infusion 0.05  pantoprazole   Suspension 40      WEIGHT  Weight (kg): 72.2 (02-26-20 @ 11:30)  Creatinine, Serum: 0.7 mg/dL (02-28-20 @ 05:00)  Creatinine, Serum: 0.7 mg/dL (02-27-20 @ 18:34)      ANTIBIOTICS:  meropenem  IVPB      meropenem  IVPB 1000 milliGRAM(s) IV Intermittent every 8 hours      All available historical records have been reviewed

## 2020-02-28 NOTE — CONSULT NOTE ADULT - ASSESSMENT
Consult Summary  75 year old woman with history of COPD, autoimmune hepatitis with history of alveloar hemorrhage on prdenisone/tacrolimus, heterozygous prothrombin gene mutation, history of DVTs presented with SOB and hemoptysis.   Hospital course complicated by acute respiratory failure with ARDS, diffuse alveolar hemorrhage, ventilator associated pneumonia requiring multiple intubations, candidemia, rectal bleeding, REJI. Very poor prognosis per primary team. Palliative care consulted for [facility].    Patient seen at bedside; sedated, intubated and unable to participate in exam.  Patient appeared in no acute distress. Called and spoke with family. Plan for family meeting on Monday 3/2, at 11am.    Morphine Equivalent Daily Dose (MEDD):  on fentanyl ggt, total unknown    Recommendations:  -ongoing critical care/medical management per primary ICU team  -Full code (not discussed today)  -Plan for family meeting on Monday 3/2, at 11am  -Palliative care will continue to follow.      Please Call [phone] PRN

## 2020-02-28 NOTE — CONSULT NOTE ADULT - CONSULT REQUESTED BY NAME
ED
Enrike Price
ICU
ICU
ICU Team
Jt Price
Medicine team
Saima Baeza I
medicine
medicine
mitch escobar
med

## 2020-02-28 NOTE — PROGRESS NOTE ADULT - ASSESSMENT
73y/o F w/ hx of asthma, COPD not on home O2, autoimmune hepatitis on prednisone and tacrolimus, heterozygous prothrombin gene mutation with hx of PE and DVT on coumadin admitted with SOB, patient has been treated for Flu + AH1, ?superimposed atypical PNA, Acute hypoxic respiratory failure  , Candidemia on fluconazole, REJI improving. GI are re consulted for small amount of fresh blood per rectum and blood clots     # Fresh blood per rectum :  most likely secondary to rebleeding rectal ulcer  Pt  s/p flex sig on 2/21 that showed rectal ulcer with visible vessel sp 4 clips applied   REC:   Transfuse 2 U PRBC   Check DIC panel (INR< PTT< Fibrinogen and D Dimer)   Follow hg q8 h   Continue Protonix 40 po qd   Avoid Dignshield or rectal tube   Hold tube feeding for now   Will discuss with attending the need for repeat flex sig     # Autoimmune hepatitis:  - was on prednisone and tacrolimus as outpatient  -  with normal AST/ALT  - INR 4 days ago 1.27  - currently on solumedrol 60 q 12 hours, tacrolimus trough was high pending repeat, maintain therapeutic level   - follow up with Dr. Washington as outpatient    # 0.5 cm gallbladder polyp on US:   - repeat US abdomen in 6 months 75y/o F w/ hx of asthma, COPD not on home O2, autoimmune hepatitis on prednisone and tacrolimus, heterozygous prothrombin gene mutation with hx of PE and DVT on coumadin admitted with SOB, patient has been treated for Flu + AH1, ?superimposed atypical PNA, Acute hypoxic respiratory failure  , Candidemia on fluconazole, REJI improving. GI are re consulted for small amount of fresh blood per rectum and blood clots     # Fresh blood per rectum :  most likely secondary to rebleeding rectal ulcer  Pt  s/p flex sig on 2/21 that showed rectal ulcer with visible vessel sp 4 clips applied   REC:   Transfuse 2 U PRBC   Check DIC panel (INR< PTT< Fibrinogen and D Dimer)   Follow hg q8 h   Continue Protonix 40 po qd   Avoid Dignshield or rectal tube   Hold tube feeding for now   Rec Flex Sig     # Autoimmune hepatitis:  - was on prednisone and tacrolimus as outpatient  -  with normal AST/ALT  - INR 4 days ago 1.27  - currently on solumedrol 60 q 12 hours, tacrolimus trough was high pending repeat, maintain therapeutic level   - follow up with Dr. Washington as outpatient    # 0.5 cm gallbladder polyp on US:   - repeat US abdomen in 6 months

## 2020-02-28 NOTE — CONSULT NOTE ADULT - CONSULT REQUESTED DATE/TIME
01-Feb-2020 00:00
02-Feb-2020 11:25
12-Feb-2020 11:09
16-Feb-2020 10:53
21-Feb-2020 09:28
25-Feb-2020 16:43
26-Feb-2020 15:32
27-Feb-2020 09:51
28-Feb-2020 11:46
31-Jan-2020 14:03
14-Feb-2020 18:27
24-Feb-2020 18:39

## 2020-02-28 NOTE — PROGRESS NOTE ADULT - SUBJECTIVE AND OBJECTIVE BOX
PATIENT:  DONI PATRICK  [id number]    CHIEF COMPLAINT:  Patient is a 75y old  Female who presents with a chief complaint of SOB and desaturation (2020 06:51)      INTERVAL HISTORYOVERNIGHT EVENTS:          MEDICATIONS:  MEDICATIONS  (STANDING):  albuterol/ipratropium for Nebulization 3 milliLiter(s) Nebulizer every 6 hours  calcitriol  Solution 0.25 MICROGram(s) Oral daily  calcium carbonate    500 mG (Tums) Chewable 3 Tablet(s) Chew every 8 hours  chlorhexidine 0.12% Liquid 15 milliLiter(s) Oral Mucosa two times a day  chlorhexidine 4% Liquid 1 Application(s) Topical <User Schedule>  cyanocobalamin 1000 MICROGram(s) Oral daily  dextrose 5%. 1000 milliLiter(s) (50 mL/Hr) IV Continuous <Continuous>  dextrose 50% Injectable 12.5 Gram(s) IV Push once  dextrose 50% Injectable 25 Gram(s) IV Push once  dextrose 50% Injectable 25 Gram(s) IV Push once  fentaNYL   Infusion. 0.5 MICROgram(s)/kG/Hr (3.75 mL/Hr) IV Continuous <Continuous>  gabapentin 300 milliGRAM(s) Oral at bedtime  influenza   Vaccine 0.5 milliLiter(s) IntraMuscular once  insulin lispro (HumaLOG) corrective regimen sliding scale   SubCutaneous every 6 hours  meropenem  IVPB      meropenem  IVPB 1000 milliGRAM(s) IV Intermittent every 8 hours  methylPREDNISolone sodium succinate Injectable 60 milliGRAM(s) IV Push daily  midazolam Infusion 0.02 mG/kG/Hr (1.444 mL/Hr) IV Continuous <Continuous>  multivitamin/minerals 1 Tablet(s) Oral daily  mupirocin 2% Ointment 1 Application(s) Topical two times a day  norepinephrine Infusion 0.05 MICROgram(s)/kG/Min (6.769 mL/Hr) IV Continuous <Continuous>  pantoprazole   Suspension 40 milliGRAM(s) Oral before breakfast    MEDICATIONS  (PRN):  acetaminophen   Tablet .. 650 milliGRAM(s) Oral every 6 hours PRN Temp greater or equal to 38C (100.4F)  dextrose 40% Gel 15 Gram(s) Oral once PRN Blood Glucose LESS THAN 70 milliGRAM(s)/deciliter  glucagon  Injectable 1 milliGRAM(s) IntraMuscular once PRN Glucose LESS THAN 70 milligrams/deciliter      ALLERGIES:  Allergies    No Known Allergies    Intolerances        OBJECTIVE:  ICU Vital Signs Last 24 Hrs  T(C): 37.8 (2020 00:00), Max: 37.8 (2020 00:00)  T(F): 100.1 (2020 00:00), Max: 100.1 (2020 00:00)  HR: 72 (2020 06:00) (72 - 114)  BP: 127/63 (2020 06:00) (61/41 - 175/94)  BP(mean): 98 (2020 06:00) (51 - 145)  ABP: --  ABP(mean): --  RR: 18 (2020 06:00) (12 - 36)  SpO2: 99% (:00) (93% - 100%)    Mode: AC/ CMV (Assist Control/ Continuous Mandatory Ventilation)  RR (machine): 16  TV (machine): 450  FiO2: 60  PEEP: 5  ITime: 1  MAP: 10  PIP: 29    Adult Advanced Hemodynamics Last 24 Hrs  CVP(mm Hg): --  CVP(cm H2O): --  CO: --  CI: --  PA: --  PA(mean): --  PCWP: --  SVR: --  SVRI: --  PVR: --  PVRI: --  CAPILLARY BLOOD GLUCOSE      POCT Blood Glucose.: 282 mg/dL (2020 06:03)  POCT Blood Glucose.: 299 mg/dL (2020 23:45)  POCT Blood Glucose.: 151 mg/dL (2020 18:23)  POCT Blood Glucose.: 121 mg/dL (2020 14:52)  POCT Blood Glucose.: 113 mg/dL (2020 12:47)    CAPILLARY BLOOD GLUCOSE      POCT Blood Glucose.: 282 mg/dL (2020 06:03)    I&O's Summary    2020 07:01  -  2020 07:00  --------------------------------------------------------  IN: 3298.9 mL / OUT: 1290 mL / NET: 2008.9 mL      Daily     Daily Weight in k.7 (2020 06:00)    PHYSICAL EXAMINATION:  General: sedated  HEENT: PERRLA  Neurology: sedated  Respiratory: b/l crackles  CV: tachy, S1S2, no murmurs, rubs or gallops  Abdominal: Soft, ND  Extremities:  edema all extremities  Incisions:   Tubes: intubated    LABS:  ABG - ( 2020 04:01 )  pH, Arterial: 7.39  pH, Blood: x     /  pCO2: 45    /  pO2: 73    / HCO3: 27    / Base Excess: 1.9   /  SaO2: 95                                      7.1    6.50  )-----------( 63       ( 2020 05:00 )             22.1         145  |  107  |  40<H>  ----------------------------<  304<H>  4.4   |  26  |  0.7    Ca    7.2<L>      2020 05:00  Mg     1.9         TPro  4.3<L>  /  Alb  1.9<L>  /  TBili  0.7  /  DBili  x   /  AST  40  /  ALT  61<H>  /  AlkPhos  177<H>      LIVER FUNCTIONS - ( 2020 05:00 )  Alb: 1.9 g/dL / Pro: 4.3 g/dL / ALK PHOS: 177 U/L / ALT: 61 U/L / AST: 40 U/L / GGT: x             Troponin T, Serum: 0.11 ng/mL ( @ 05:00)  Troponin T, Serum: 0.13 ng/mL ( @ 14:18)  Creatine Kinase, Serum: 118 U/L ( @ 10:12)  CKMB Units: 3.7 ng/mL ( @ 10:12)  Troponin T, Serum: 0.12 ng/mL ( @ 10:12)    CARDIAC MARKERS ( 2020 05:00 )  x     / 0.11 ng/mL / x     / x     / x      CARDIAC MARKERS ( 2020 14:18 )  x     / 0.13 ng/mL / x     / x     / x      CARDIAC MARKERS ( 2020 10:12 )  x     / 0.12 ng/mL / 118 U/L / x     / 3.7 ng/mL          TELEMETRY:     EKG:     IMAGING: PATIENT:  DONI PATRICK  [id number]    CHIEF COMPLAINT:  Patient is a 75y old  Female who presents with a chief complaint of SOB and desaturation (2020 06:51)      INTERVAL HISTORYOVERNIGHT EVENTS:  hypoxia to 85, fio2 increased from 40% to 60%, respiratory status improved        MEDICATIONS:  MEDICATIONS  (STANDING):  albuterol/ipratropium for Nebulization 3 milliLiter(s) Nebulizer every 6 hours  calcitriol  Solution 0.25 MICROGram(s) Oral daily  calcium carbonate    500 mG (Tums) Chewable 3 Tablet(s) Chew every 8 hours  chlorhexidine 0.12% Liquid 15 milliLiter(s) Oral Mucosa two times a day  chlorhexidine 4% Liquid 1 Application(s) Topical <User Schedule>  cyanocobalamin 1000 MICROGram(s) Oral daily  dextrose 5%. 1000 milliLiter(s) (50 mL/Hr) IV Continuous <Continuous>  dextrose 50% Injectable 12.5 Gram(s) IV Push once  dextrose 50% Injectable 25 Gram(s) IV Push once  dextrose 50% Injectable 25 Gram(s) IV Push once  fentaNYL   Infusion. 0.5 MICROgram(s)/kG/Hr (3.75 mL/Hr) IV Continuous <Continuous>  gabapentin 300 milliGRAM(s) Oral at bedtime  influenza   Vaccine 0.5 milliLiter(s) IntraMuscular once  insulin lispro (HumaLOG) corrective regimen sliding scale   SubCutaneous every 6 hours  meropenem  IVPB      meropenem  IVPB 1000 milliGRAM(s) IV Intermittent every 8 hours  methylPREDNISolone sodium succinate Injectable 60 milliGRAM(s) IV Push daily  midazolam Infusion 0.02 mG/kG/Hr (1.444 mL/Hr) IV Continuous <Continuous>  multivitamin/minerals 1 Tablet(s) Oral daily  mupirocin 2% Ointment 1 Application(s) Topical two times a day  norepinephrine Infusion 0.05 MICROgram(s)/kG/Min (6.769 mL/Hr) IV Continuous <Continuous>  pantoprazole   Suspension 40 milliGRAM(s) Oral before breakfast    MEDICATIONS  (PRN):  acetaminophen   Tablet .. 650 milliGRAM(s) Oral every 6 hours PRN Temp greater or equal to 38C (100.4F)  dextrose 40% Gel 15 Gram(s) Oral once PRN Blood Glucose LESS THAN 70 milliGRAM(s)/deciliter  glucagon  Injectable 1 milliGRAM(s) IntraMuscular once PRN Glucose LESS THAN 70 milligrams/deciliter      ALLERGIES:  Allergies    No Known Allergies    Intolerances        OBJECTIVE:  ICU Vital Signs Last 24 Hrs  T(C): 37.8 (2020 00:00), Max: 37.8 (2020 00:00)  T(F): 100.1 (2020 00:00), Max: 100.1 (2020 00:00)  HR: 72 (2020 06:00) (72 - 114)  BP: 127/63 (2020 06:00) (61/41 - 175/94)  BP(mean): 98 (2020 06:00) (51 - 145)  ABP: --  ABP(mean): --  RR: 18 (2020 06:00) (12 - 36)  SpO2: 99% (2020 06:00) (93% - 100%)    Mode: AC/ CMV (Assist Control/ Continuous Mandatory Ventilation)  RR (machine): 16  TV (machine): 450  FiO2: 60  PEEP: 5  ITime: 1  MAP: 10  PIP: 29    Adult Advanced Hemodynamics Last 24 Hrs  CVP(mm Hg): --  CVP(cm H2O): --  CO: --  CI: --  PA: --  PA(mean): --  PCWP: --  SVR: --  SVRI: --  PVR: --  PVRI: --  CAPILLARY BLOOD GLUCOSE      POCT Blood Glucose.: 282 mg/dL (2020 06:03)  POCT Blood Glucose.: 299 mg/dL (2020 23:45)  POCT Blood Glucose.: 151 mg/dL (2020 18:23)  POCT Blood Glucose.: 121 mg/dL (2020 14:52)  POCT Blood Glucose.: 113 mg/dL (2020 12:47)    CAPILLARY BLOOD GLUCOSE      POCT Blood Glucose.: 282 mg/dL (2020 06:03)    I&O's Summary    2020 07:01  -  2020 07:00  --------------------------------------------------------  IN: 3298.9 mL / OUT: 1290 mL / NET: 2008.9 mL      Daily     Daily Weight in k.7 (2020 06:00)    PHYSICAL EXAMINATION:  General: sedated  HEENT: PERRLA  Neurology: sedated  Respiratory: b/l crackles  CV: tachy, S1S2, no murmurs, rubs or gallops  Abdominal: Soft, ND  Extremities:  edema all extremities  Incisions:   Tubes: intubated    LABS:  ABG - ( 2020 04:01 )  pH, Arterial: 7.39  pH, Blood: x     /  pCO2: 45    /  pO2: 73    / HCO3: 27    / Base Excess: 1.9   /  SaO2: 95                                      7.1    6.50  )-----------( 63       ( 2020 05:00 )             22.1         145  |  107  |  40<H>  ----------------------------<  304<H>  4.4   |  26  |  0.7    Ca    7.2<L>      2020 05:00  Mg     1.9         TPro  4.3<L>  /  Alb  1.9<L>  /  TBili  0.7  /  DBili  x   /  AST  40  /  ALT  61<H>  /  AlkPhos  177<H>      LIVER FUNCTIONS - ( 2020 05:00 )  Alb: 1.9 g/dL / Pro: 4.3 g/dL / ALK PHOS: 177 U/L / ALT: 61 U/L / AST: 40 U/L / GGT: x             Troponin T, Serum: 0.11 ng/mL ( @ 05:00)  Troponin T, Serum: 0.13 ng/mL ( @ 14:18)  Creatine Kinase, Serum: 118 U/L ( @ 10:12)  CKMB Units: 3.7 ng/mL ( @ 10:12)  Troponin T, Serum: 0.12 ng/mL ( @ 10:12)    CARDIAC MARKERS ( 2020 05:00 )  x     / 0.11 ng/mL / x     / x     / x      CARDIAC MARKERS ( 2020 14:18 )  x     / 0.13 ng/mL / x     / x     / x      CARDIAC MARKERS ( 2020 10:12 )  x     / 0.12 ng/mL / 118 U/L / x     / 3.7 ng/mL          TELEMETRY:     EKG:     IMAGING:

## 2020-02-28 NOTE — PROGRESS NOTE ADULT - SUBJECTIVE AND OBJECTIVE BOX
ELDER, DONI  75y  Female  HPI:  75y/o F w/ hx of asthma, COPD not on home O2, autoimmune hepatitis on prednisone and tacrolimus, heterozygous prothrombin gene mutation with hx of PE and DVT on coumadin with recent hospitalization in January 2020 with a diagnosis of pneumonia presents for worsening SOB, hemoptysis and cough. Everything started yesterday night when patient was in bed, started to feel shortness of breath and had many episodes of hemoptysis with clots, she also had substernal chest pain non radiating, moderate in intensity that worsens on coughing. She denies fever but endorses chills. She also admits for lightheadedness that occurred yesterday, no HA, abd pain, dysuria or paresthesias. She had 2 loose bowel movements since yesterday with some blood in the stools that she attributes to her hemorrhoids. She stopped Coumadin couple of days ago since her INR was supratherapeutic. She is from [facility] short term and also mentions that her  and another family member have the flu and she was exposed to them. She did not have the flu shot this season.  In ED, temp was 100.1 rectally, tachycardic ( sinus) , she was saturating to 70s on non rebreather and her SaO2 went up to 95%. ABG showing respiratory alkalosis initially and then corrected after BIPAP placement and correction of RR.  CTA chest showing no PE but showing interval development of multifocal bilateral groundglass opacities as well as new moderate to severe bronchiectasis in the right lung. Findings are concerning for an acute infectious/inflammatory process. (31 Jan 2020 14:36)    MEDICATIONS  (STANDING):  albuterol/ipratropium for Nebulization 3 milliLiter(s) Nebulizer every 6 hours  calcitriol  Solution 0.25 MICROGram(s) Oral daily  calcium carbonate    500 mG (Tums) Chewable 3 Tablet(s) Chew every 8 hours  chlorhexidine 0.12% Liquid 15 milliLiter(s) Oral Mucosa two times a day  chlorhexidine 4% Liquid 1 Application(s) Topical <User Schedule>  cyanocobalamin 1000 MICROGram(s) Oral daily  dextrose 5%. 1000 milliLiter(s) (50 mL/Hr) IV Continuous <Continuous>  dextrose 50% Injectable 12.5 Gram(s) IV Push once  dextrose 50% Injectable 25 Gram(s) IV Push once  dextrose 50% Injectable 25 Gram(s) IV Push once  fentaNYL   Infusion. 0.5 MICROgram(s)/kG/Hr (3.75 mL/Hr) IV Continuous <Continuous>  gabapentin 300 milliGRAM(s) Oral at bedtime  influenza   Vaccine 0.5 milliLiter(s) IntraMuscular once  insulin lispro (HumaLOG) corrective regimen sliding scale   SubCutaneous every 6 hours  meropenem  IVPB      meropenem  IVPB 1000 milliGRAM(s) IV Intermittent every 8 hours  methylPREDNISolone sodium succinate Injectable 60 milliGRAM(s) IV Push daily  midazolam Infusion 0.02 mG/kG/Hr (1.444 mL/Hr) IV Continuous <Continuous>  multivitamin/minerals 1 Tablet(s) Oral daily  mupirocin 2% Ointment 1 Application(s) Topical two times a day  norepinephrine Infusion 0.05 MICROgram(s)/kG/Min (6.769 mL/Hr) IV Continuous <Continuous>  pantoprazole   Suspension 40 milliGRAM(s) Oral before breakfast    MEDICATIONS  (PRN):  acetaminophen   Tablet .. 650 milliGRAM(s) Oral every 6 hours PRN Temp greater or equal to 38C (100.4F)  dextrose 40% Gel 15 Gram(s) Oral once PRN Blood Glucose LESS THAN 70 milliGRAM(s)/deciliter  glucagon  Injectable 1 milliGRAM(s) IntraMuscular once PRN Glucose LESS THAN 70 milligrams/deciliter    INTERVAL EVENTS: Patient seen this am, discussed with nursing staff. Patient desaturated over night, FIO2 increased to 60%. Patient remains sedated on pressors.    T(C): 37.8 (02-28-20 @ 00:00), Max: 37.8 (02-28-20 @ 00:00)  HR: 114 (02-28-20 @ 02:15) (81 - 114)  BP: 175/94 (02-28-20 @ 02:15) (61/41 - 175/94)  RR: 27 (02-28-20 @ 02:15) (12 - 36)  SpO2: 95% (02-28-20 @ 02:15) (93% - 100%)  Wt(kg): --Vital Signs Last 24 Hrs  T(C): 37.8 (28 Feb 2020 00:00), Max: 37.8 (28 Feb 2020 00:00)  T(F): 100.1 (28 Feb 2020 00:00), Max: 100.1 (28 Feb 2020 00:00)  HR: 114 (28 Feb 2020 02:15) (81 - 114)  BP: 175/94 (28 Feb 2020 02:15) (61/41 - 175/94)  BP(mean): 145 (28 Feb 2020 02:15) (51 - 145)  RR: 27 (28 Feb 2020 02:15) (12 - 36)  SpO2: 95% (28 Feb 2020 02:15) (93% - 100%)    PHYSICAL EXAM:  GENERAL:  Sedated, NGT, ET in place  NECK: Supple, No JVD, New TLC  CHEST/LUNG: Coarse BS  HEART: S1, S2, Regular rate and rhythm  ABDOMEN: Soft, Nontender, Nondistended; Bowel sounds present  EXTREMITIES: ++ edema  SKIN: multiple skin tears, bruising    LABS:                        7.1    6.50  )-----------( 63       ( 28 Feb 2020 05:00 )             22.1             02-28    145  |  107  |  40<H>  ----------------------------<  304<H>  4.4   |  26  |  0.7    Ca    7.2<L>      28 Feb 2020 05:00  Mg     1.9     02-27    TPro  4.3<L>  /  Alb  1.9<L>  /  TBili  0.7  /  DBili  x   /  AST  40  /  ALT  61<H>  /  AlkPhos  177<H>  02-28    LIVER FUNCTIONS - ( 28 Feb 2020 05:00 )  Alb: 1.9 g/dL / Pro: 4.3 g/dL / ALK PHOS: 177 U/L / ALT: 61 U/L / AST: 40 U/L / GGT: x                   CARDIAC MARKERS ( 28 Feb 2020 05:00 )  x     / 0.11 ng/mL / x     / x     / x      CARDIAC MARKERS ( 27 Feb 2020 14:18 )  x     / 0.13 ng/mL / x     / x     / x      CARDIAC MARKERS ( 27 Feb 2020 10:12 )  x     / 0.12 ng/mL / 118 U/L / x     / 3.7 ng/mL      ABG - ( 28 Feb 2020 04:01 )  pH, Arterial: 7.39  pH, Blood: x     /  pCO2: 45    /  pO2: 73    / HCO3: 27    / Base Excess: 1.9   /  SaO2: 95          Culture - Acid Fast - Bronchial w/Smear (collected 26 Feb 2020 14:45)  Source: .Bronchial None    Culture - Fungal, Bronchial (collected 26 Feb 2020 14:45)  Source: .Bronchial None  Preliminary Report (27 Feb 2020 08:21):    Testing in progress    Culture - Bronchial (collected 26 Feb 2020 14:45)  Source: .Bronchial None  Gram Stain (27 Feb 2020 07:36):    Moderate polymorphonuclear leukocytes seen per low power field    Rare Squamous epithelial cells seen per low power field    Numerous Gram Negative Rods seen per oil power field  Preliminary Report (27 Feb 2020 20:53):    Numerous Klebsiella pneumoniae      RADIOLOGY & ADDITIONAL TESTS:  < from: Xray Chest 1 View-PORTABLE IMMEDIATE (02.27.20 @ 15:02) >  FINDINGS:    Support devices: ET tube terminates above the baljinder. Stable enteric tube.  Right IJ catheter with tip in the SVC    Cardiac/mediastinum/hilum: Stable.    Lung parenchyma/Pleura: Unchanged.    Skeleton/soft tissues: Stable.    IMPRESSION:    Overall, no change since the prior exam. No pneumothorax        < end of copied text >

## 2020-02-28 NOTE — PROGRESS NOTE ADULT - SUBJECTIVE AND OBJECTIVE BOX
We are following the patient for rectal bleed     GI HPI Today:  Pt had 2 episodes of red blood per rectum and blood keep coming out of the rectum   Drop in Hg   Pt still on pressors with slight increase in requirement today   No blood in Ng tube   No vomiting blood     PAST MEDICAL & SURGICAL HISTORY  Prothrombin gene mutation  Autoimmune hepatitis  Other chronic pulmonary embolism without acute cor pulmonale  Essential hypertension  Autoimmune hepatitis treated with steroids  Asthma  DVT (deep venous thrombosis)  S/P debridement  History of back surgery      ALLERGIES:  No Known Allergies      MEDICATIONS:  MEDICATIONS  (STANDING):  albuterol/ipratropium for Nebulization 3 milliLiter(s) Nebulizer every 6 hours  calcitriol  Solution 0.25 MICROGram(s) Oral daily  calcium carbonate    500 mG (Tums) Chewable 3 Tablet(s) Chew every 8 hours  chlorhexidine 0.12% Liquid 15 milliLiter(s) Oral Mucosa two times a day  chlorhexidine 4% Liquid 1 Application(s) Topical <User Schedule>  cyanocobalamin 1000 MICROGram(s) Oral daily  dextrose 5%. 1000 milliLiter(s) (50 mL/Hr) IV Continuous <Continuous>  dextrose 50% Injectable 12.5 Gram(s) IV Push once  dextrose 50% Injectable 25 Gram(s) IV Push once  dextrose 50% Injectable 25 Gram(s) IV Push once  fentaNYL   Infusion. 0.5 MICROgram(s)/kG/Hr (3.75 mL/Hr) IV Continuous <Continuous>  gabapentin 300 milliGRAM(s) Oral at bedtime  influenza   Vaccine 0.5 milliLiter(s) IntraMuscular once  insulin lispro (HumaLOG) corrective regimen sliding scale   SubCutaneous every 6 hours  meropenem  IVPB      meropenem  IVPB 1000 milliGRAM(s) IV Intermittent every 8 hours  methylPREDNISolone sodium succinate Injectable 60 milliGRAM(s) IV Push every 12 hours  midazolam Infusion 0.02 mG/kG/Hr (1.444 mL/Hr) IV Continuous <Continuous>  multivitamin/minerals 1 Tablet(s) Oral daily  mupirocin 2% Ointment 1 Application(s) Topical two times a day  norepinephrine Infusion 0.05 MICROgram(s)/kG/Min (6.769 mL/Hr) IV Continuous <Continuous>  pantoprazole   Suspension 40 milliGRAM(s) Oral before breakfast    MEDICATIONS  (PRN):  acetaminophen   Tablet .. 650 milliGRAM(s) Oral every 6 hours PRN Temp greater or equal to 38C (100.4F)  dextrose 40% Gel 15 Gram(s) Oral once PRN Blood Glucose LESS THAN 70 milliGRAM(s)/deciliter  glucagon  Injectable 1 milliGRAM(s) IntraMuscular once PRN Glucose LESS THAN 70 milligrams/deciliter      REVIEW OF SYSTEMS  unable to obtain        VITALS:   T(F): 99.4 (02-28 @ 12:00), Max: 100.1 (02-28 @ 00:00)  HR: 70 (02-28 @ 12:15) (68 - 140)  BP: 107/53 (02-28 @ 12:15) (55/45 - 196/96)  BP(mean): 76 (02-28 @ 12:15) (50 - 147)  RR: 17 (02-28 @ 12:15) (12 - 71)  SpO2: 100% (02-28 @ 12:15) (65% - 100%)        PHYSICAL EXAM:  GENERAL:  intubated and vented   HEENT:  Conjunctivae Anicteric   CHEST:  vented   HEART:  NS1, S2,   ABDOMEN:  Soft  EXTEREMITIES:  no edema  SKIN:  No rash  NEURO:  sedated   rectal: red blood, no melena        Blood Work :                        7.1    6.50  )-----------( 63       ( 28 Feb 2020 05:00 )             22.1       02-28    145  |  107  |  40<H>  ----------------------------<  304<H>  4.4   |  26  |  0.7    Ca    7.2<L>      28 Feb 2020 05:00  Mg     1.9     02-27      CBC -  ( 28 Feb 2020 05:00 )  Hemoglobin : 7.1    CBC -  ( 27 Feb 2020 18:34 )  Hemoglobin : 7.8    CBC -  ( 27 Feb 2020 04:30 )  Hemoglobin : 7.7    CBC -  ( 26 Feb 2020 08:39 )  Hemoglobin : 8.7    CBC -  ( 25 Feb 2020 06:46 )  Hemoglobin : 8.5    CBC -  ( 25 Feb 2020 01:10 )  Hemoglobin : 8.9      LIVER FUNCTIONS - ( 28 Feb 2020 05:00 )  Alb: 1.9 [3.5 - 5.2] / Pro: 4.3 [6.0 - 8.0] / ALK PHOS: 177 [30 - 115] / ALT: 61 [0 - 41] / AST: 40 [0 - 41] / GGT: x     LIVER FUNCTIONS - ( 27 Feb 2020 04:30 )  Alb: 1.8 [3.5 - 5.2] / Pro: 4.1 [6.0 - 8.0] / ALK PHOS: 169 [30 - 115] / ALT: 67 [0 - 41] / AST: 43 [0 - 41] / GGT: x     LIVER FUNCTIONS - ( 26 Feb 2020 08:39 )  Alb: 2.3 [3.5 - 5.2] / Pro: 4.5 [6.0 - 8.0] / ALK PHOS: 227 [30 - 115] / ALT: 69 [0 - 41] / AST: 54 [0 - 41] / GGT: x     LIVER FUNCTIONS - ( 24 Feb 2020 04:52 )  Alb: 2.5 [3.5 - 5.2] / Pro: 4.4 [6.0 - 8.0] / ALK PHOS: 173 [30 - 115] / ALT: 60 [0 - 41] / AST: 57 [0 - 41] / GGT: x     LIVER FUNCTIONS - ( 23 Feb 2020 19:06 )  Alb: 2.7 [3.5 - 5.2] / Pro: 4.9 [6.0 - 8.0] / ALK PHOS: 206 [30 - 115] / ALT: 62 [0 - 41] / AST: 47 [0 - 41] / GGT: x     LIVER FUNCTIONS - ( 23 Feb 2020 05:11 )  Alb: 2.1 [3.5 - 5.2] / Pro: 3.6 [6.0 - 8.0] / ALK PHOS: 157 [30 - 115] / ALT: 51 [0 - 41] / AST: 86 [0 - 41] / GGT: x     LIVER FUNCTIONS - ( 22 Feb 2020 10:14 )  Alb: 2.4 [3.5 - 5.2] / Pro: 4.2 [6.0 - 8.0] / ALK PHOS: 168 [30 - 115] / ALT: 39 [0 - 41] / AST: 30 [0 - 41] / GGT: x

## 2020-02-28 NOTE — CONSULT NOTE ADULT - REASON FOR ADMISSION
SOB and desaturation
arf

## 2020-02-28 NOTE — PROGRESS NOTE ADULT - ASSESSMENT
IMPRESSION:    Acute hypoxic respiratory failure s/p reintubation/ acute DVT/ cant RO PE s/p bronch/ klebsiella  DAH resolving  Candidemia on caspofungin  HO Autoimmune hepatitis on tacrolimus and solumedrol  S/P GFF      PLAN:    CNS:  SAT, versed / fentanyl    HEENT: ET care.  Oral care.      PULMONARY:  HOB @ 45 degrees. Solumedrol 60 mg q12h. Wean O2. Dec PEEP to 5    CARDIOVASCULAR:  free water    GI: GI prophylaxis.  NG feeding.    free water 250 cc Q 4 hrs. ruq SONO    RENAL:  Follow up lytes. Replete as needed.     INFECTIOUS DISEASE: Follow up cultures.  Continue Anti fungal add meropnem till cx    HEMATOLOGICAL:  DVT prophylaxis--> SCD; repeat CBC    ENDOCRINE:  Follow up FS.  Insulin protocol if needed.    MUSCULOSKELETAL: Bed rest     Prognosis: Poor prognosis   very poor prognosis  spoke with

## 2020-02-28 NOTE — PROGRESS NOTE ADULT - ASSESSMENT
74y female with PMH of asthma, COPD not on home O2, autoimmune hepatitis on prednisone and tacrolimus, heterozygous prothrombin gene mutation with hx of PE and DVT on coumadin presents to the hospital complaining of cough, hemoptysis. Found to have viral pneumonia with respiratory failure and intubated. Extubated after prolonged course but required re-intubation 2/26 for worsening respiratory status and received IVC filter 2/26 due to not being able to AC (alveolar hemorrhage).     #ARDS, alveolar hemorrhage, viral pneumonia, VAC pneumonia due to MRSA   - completed Zyvox, Levaquin, Cefepime course, and oseltamivir   - C/w Duoneb   - Solumedrol IV 60mg qd  - Bronch results - neg culture, neg fungal, cytology positive for inflammatory cells, no organisms  - Bronch 2/16 showed bleeding from DAH  - Bronch 2/26 for BAL, cultures show gram - rods, start meropenem 2/27    #Elevated lactate/tropnemia  - lactate , 1.7-->3.1-->2.6-->1.5 (2/27)-->2.6-->3.1 (2/28)  - on meropenem for positive bronchial culture  - tropnemia due to demand ischemia?  - f/u trop/lactate    #Candidemia, secondary to central line  - cultures neg since 2/14  - oral fluconazole 14 days, switched to caspofungin for rising LFT (2/26)  - ID following    #Rectal Bleeding   - GI following   - received 1U of blood 2/23, hemoglobin stable  - Protonix 40mg PO qd   - FlexSig showed 2 rectal ulcers one clean one crater, s/p clipping  - CBC q24  - Avoid Dignshield or rectal tube     #Hypokalemia  -Given 40 mEq twice today, f/u repeat    #REJI oliguric likely ATN with Vanc toxicity   - Resolved  - Monitor BMP    # 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  - duplex shows DVT consistent with prior  - not candidate for AC at this time due to alveolar hemorrhage   - IVC filter placed 2/26    #Immunosuppressed: Autoimmune Hepatitis   - solumedrol 60 mg qd  - holding home prednisone 60 mg PO qd   - CMV PCR neg this admission   - f/u repeat tarcolimus level    #HTN  - Continue amlodipine 10 qd, Lopressor 50 BID    #Heterozygous prothrombin gene mutation with hx of PE and DVT on coumadin  - holding coumadin for now due to alveolar hemorrhage   - monitor coags    #0.5 cm of GB polyp  - needs f/u US in 12 months     #Hx of asthma  - C/w montelukast qd    #Deconditioning   -pt severely deconditioned due to prolonged hospital stay  - PT/Rehab     # GI PPx: Protonix 40 mg PO qd  # DVT PPx: IVC filter  # Code Status: FULL 74y female with PMH of asthma, COPD not on home O2, autoimmune hepatitis on prednisone and tacrolimus, heterozygous prothrombin gene mutation with hx of PE and DVT on coumadin presents to the hospital complaining of cough, hemoptysis. Found to have viral pneumonia with respiratory failure and intubated. Extubated after prolonged course but required re-intubation 2/26 for worsening respiratory status and received IVC filter 2/26 due to not being able to AC (alveolar hemorrhage).     #ARDS, alveolar hemorrhage, viral pneumonia, VAC pneumonia due to MRSA   - completed Zyvox, Levaquin, Cefepime course, and oseltamivir   - C/w Duoneb   - Solumedrol IV 60mg qd  - Bronch results - neg culture, neg fungal, cytology positive for inflammatory cells, no organisms  - Bronch 2/16 showed bleeding from DAH  - Bronch 2/26 for BAL, cultures show gram - rods, start meropenem 2/27    #Elevated lactate/tropnemia  - lactate , 1.7-->3.1-->2.6-->1.5 (2/27)-->2.6-->3.1 (2/28)  - on meropenem for positive bronchial culture  - tropnemia due to demand ischemia?  - f/u trop/lactate    #Candidemia, secondary to central line  - cultures neg since 2/14  - oral fluconazole 14 days, switched to caspofungin for rising LFT (2/26), completed 2/27  - ID following    #Rectal Bleeding   - GI following   - received 1U of blood 2/23, hemoglobin stable  - Protonix 40mg PO qd   - FlexSig showed 2 rectal ulcers one clean one crater, s/p clipping  - CBC q24  - Avoid Dignshield or rectal tube     #Hypokalemia  -Given 40 mEq twice today, f/u repeat    #REJI oliguric likely ATN with Vanc toxicity   - Resolved  - Monitor BMP    # 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  - duplex shows DVT consistent with prior  - not candidate for AC at this time due to alveolar hemorrhage   - IVC filter placed 2/26    #Immunosuppressed: Autoimmune Hepatitis   - solumedrol 60 mg qd  - holding home prednisone 60 mg PO qd   - CMV PCR neg this admission   - f/u repeat tarcolimus level    #HTN  - Continue amlodipine 10 qd, Lopressor 50 BID    #Heterozygous prothrombin gene mutation with hx of PE and DVT on coumadin  - holding coumadin for now due to alveolar hemorrhage   - monitor coags    #0.5 cm of GB polyp  - needs f/u US in 12 months     #Hx of asthma  - C/w montelukast qd    #Deconditioning   -pt severely deconditioned due to prolonged hospital stay  - PT/Rehab     # GI PPx: Protonix 40 mg PO qd  # DVT PPx: IVC filter  # Code Status: FULL, palliative consult 74y female with PMH of asthma, COPD not on home O2, autoimmune hepatitis on prednisone and tacrolimus, heterozygous prothrombin gene mutation with hx of PE and DVT on coumadin presents to the hospital complaining of cough, hemoptysis. Found to have viral pneumonia with respiratory failure and intubated. Extubated after prolonged course but required re-intubation 2/26 for worsening respiratory status and received IVC filter 2/26 due to not being able to AC (alveolar hemorrhage).     #ARDS, alveolar hemorrhage, viral pneumonia, VAC pneumonia due to MRSA   - completed Zyvox, Levaquin, Cefepime course, and oseltamivir   - C/w Duoneb   - Solumedrol IV 60mg qd  - Bronch results - neg culture, neg fungal, cytology positive for inflammatory cells, no organisms  - Bronch 2/16 showed bleeding from DAH  - Bronch 2/26 for BAL, cultures show gram - rods, start meropenem 2/27    #Elevated lactate/tropnemia  - lactate , 1.7-->3.1-->2.6-->1.5 (2/27)-->2.6-->3.1 (2/28)  - on meropenem for positive bronchial culture  - tropnemia due to demand ischemia?  - f/u trop/lactate    #Candidemia, secondary to central line  - cultures neg since 2/14  - oral fluconazole 14 days, switched to caspofungin for rising LFT (2/26), completed 2/27  - ID following    #Rectal Bleeding   - GI following   - received 1U of blood 2/23, hemoglobin stable  - Protonix 40mg PO qd   - FlexSig showed 2 rectal ulcers one clean one crater, s/p clipping  - CBC q24  - Avoid Dignshield or rectal tube   - recurrent rectal bleeding likely from previous ulcers 2/28, GI will take pt to endoscopy, hgb 7.1--> 6.3, 1 U pRBC ordered    #Hypokalemia  -Given 40 mEq twice today, f/u repeat    #REJI oliguric likely ATN with Vanc toxicity   - Resolved  - Monitor BMP    # 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  - duplex shows DVT consistent with prior  - not candidate for AC at this time due to alveolar hemorrhage   - IVC filter placed 2/26    #Immunosuppressed: Autoimmune Hepatitis   - solumedrol 60 mg qd  - holding home prednisone 60 mg PO qd   - CMV PCR neg this admission   - f/u repeat tarcolimus level    #HTN  - Continue amlodipine 10 qd, Lopressor 50 BID    #Heterozygous prothrombin gene mutation with hx of PE and DVT on coumadin  - holding coumadin for now due to alveolar hemorrhage   - monitor coags    #0.5 cm of GB polyp  - needs f/u US in 12 months     #Hx of asthma  - C/w montelukast qd    #Deconditioning   -pt severely deconditioned due to prolonged hospital stay  - PT/Rehab     # GI PPx: Protonix 40 mg PO qd  # DVT PPx: IVC filter  # Code Status: FULL, palliative consult 74y female with PMH of asthma, COPD not on home O2, autoimmune hepatitis on prednisone and tacrolimus, heterozygous prothrombin gene mutation with hx of PE and DVT on coumadin presents to the hospital complaining of cough, hemoptysis. Found to have viral pneumonia with respiratory failure and intubated. Extubated after prolonged course but required re-intubation 2/26 for worsening respiratory status and received IVC filter 2/26 due to not being able to AC (alveolar hemorrhage).     #ARDS, alveolar hemorrhage, viral pneumonia, VAC pneumonia due to MRSA   - completed Zyvox, Levaquin, Cefepime course, and oseltamivir   - C/w Duoneb   - Solumedrol IV 60mg qd  - Bronch results - neg culture, neg fungal, cytology positive for inflammatory cells, no organisms  - Bronch 2/16 showed bleeding from DAH  - Bronch 2/26 for BAL, cultures show gram - rods, start meropenem 2/27    #Elevated lactate/tropnemia  - lactate , 1.7-->3.1-->2.6-->1.5 (2/27)-->2.6-->3.1 (2/28)  - on meropenem for positive bronchial culture  - tropnemia due to demand ischemia?  - f/u trop/lactate    #Candidemia, secondary to central line  - cultures neg since 2/14  - oral fluconazole 14 days, switched to caspofungin for rising LFT (2/26), completed 2/27  - ID following    #Rectal Bleeding   - GI following   - received 1U of blood 2/23, hemoglobin stable  - Protonix 40mg PO qd   - FlexSig showed 2 rectal ulcers one clean one crater, s/p clipping  - CBC q24  - Avoid Dignshield or rectal tube   - recurrent rectal bleeding possibly from diverticulosis 2/28, hgb 7.1--> 6.3, 2U pRBC, s/p sigmoidoscopy, f/u cbc 8 pm, if bleeding again, needs CT angio a/p     #Hypokalemia  -Given 40 mEq twice today, f/u repeat    #REJI oliguric likely ATN with Vanc toxicity   - Resolved  - Monitor BMP    # 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  - duplex shows DVT consistent with prior  - not candidate for AC at this time due to alveolar hemorrhage   - IVC filter placed 2/26    #Immunosuppressed: Autoimmune Hepatitis   - solumedrol 60 mg qd  - holding home prednisone 60 mg PO qd   - CMV PCR neg this admission   - f/u repeat tarcolimus level    #HTN  - Continue amlodipine 10 qd, Lopressor 50 BID    #Heterozygous prothrombin gene mutation with hx of PE and DVT on coumadin  - holding coumadin for now due to alveolar hemorrhage   - monitor coags    #0.5 cm of GB polyp  - needs f/u US in 12 months     #Hx of asthma  - C/w montelukast qd    #Deconditioning   -pt severely deconditioned due to prolonged hospital stay  - PT/Rehab     # GI PPx: Protonix 40 mg PO qd  # DVT PPx: IVC filter  # Code Status: FULL, palliative consult 74y female with PMH of asthma, COPD not on home O2, autoimmune hepatitis on prednisone and tacrolimus, heterozygous prothrombin gene mutation with hx of PE and DVT on coumadin presents to the hospital complaining of cough, hemoptysis. Found to have viral pneumonia with respiratory failure and intubated. Extubated after prolonged course but required re-intubation 2/26 for worsening respiratory status and received IVC filter 2/26 due to not being able to AC (alveolar hemorrhage).     #ARDS, alveolar hemorrhage, viral pneumonia, VAC pneumonia due to MRSA   - completed Zyvox, Levaquin, Cefepime course, and oseltamivir   - C/w Duoneb   - Solumedrol IV 60mg q12  - Bronch results - neg culture, neg fungal, cytology positive for inflammatory cells, no organisms  - Bronch 2/16 showed bleeding from DAH  - Bronch 2/26 for BAL, cultures show gram - rods, start meropenem 2/27    #Elevated lactate/tropnemia  - lactate , 1.7-->3.1-->2.6-->1.5 (2/27)-->2.6-->3.1 (2/28)  - on meropenem for positive bronchial culture  - tropnemia due to demand ischemia?  - f/u trop/lactate    #Candidemia, secondary to central line  - cultures neg since 2/14  - oral fluconazole 14 days, switched to caspofungin for rising LFT (2/26), completed 2/27  - ID following    #Rectal Bleeding   - GI following   - received 1U of blood 2/23, hemoglobin stable  - Protonix 40mg PO qd   - FlexSig showed 2 rectal ulcers one clean one crater, s/p clipping  - CBC q24  - Avoid Dignshield or rectal tube   - recurrent rectal bleeding possibly from diverticulosis 2/28, hgb 7.1--> 6.3, 2U pRBC, s/p sigmoidoscopy, f/u cbc 8 pm, if bleeding again, needs CT angio a/p     #Hypokalemia  -Given 40 mEq twice today, f/u repeat    #REJI oliguric likely ATN with Vanc toxicity   - Resolved  - Monitor BMP    # 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  - duplex shows DVT consistent with prior  - not candidate for AC at this time due to alveolar hemorrhage   - IVC filter placed 2/26    #Immunosuppressed: Autoimmune Hepatitis   - solumedrol 60 mg qd  - holding home prednisone 60 mg PO qd   - CMV PCR neg this admission   - f/u repeat tarcolimus level    #HTN  - Continue amlodipine 10 qd, Lopressor 50 BID    #Heterozygous prothrombin gene mutation with hx of PE and DVT on coumadin  - holding coumadin for now due to alveolar hemorrhage   - monitor coags    #0.5 cm of GB polyp  - needs f/u US in 12 months     #Hx of asthma  - C/w montelukast qd    #Deconditioning   -pt severely deconditioned due to prolonged hospital stay  - PT/Rehab     # GI PPx: Protonix 40 mg PO qd  # DVT PPx: IVC filter  # Code Status: FULL, palliative consult

## 2020-02-28 NOTE — PROGRESS NOTE ADULT - ASSESSMENT
ASSESSMENT  73y/o F w/ hx of asthma, COPD not on home O2, autoimmune hepatitis on prednisone and tacrolimus, heterozygous prothrombin gene mutation with hx of PE and DVT on coumadin with recent hospitalization in January 2020 with a diagnosis of pneumonia presents for worsening SOB, hemoptysis and cough.    IMPRESSION  #GNR PNA- BAL 2/26 with   Numerous Klebsiella pneumoniae    Acute hypoxemia, intubated 2/26, possible PE given +DVTs s/p Option Elite IVC filter via Right IJ access.  #Thrombocytopenia    continues post stopping linezolid   #Hx recent MRSA VAP    2/14 tracheal cx   Moderate Methicillin resistant Staphylococcus aureus    completed 8 day course, noted to have thrombocytopenia on linezolid  #Candidemia Fungitell: >500 (02-11-20 @ 11:21) likely CLABSI    2/11 BCX +, 2/12 BCX +, 2/13 BCX (-) RIJ 2/11. Groin a-line 2/4- removed 2/13     Fungitell: <31 (02-04-20 @ 04:40), Fungitell: 49 (02-01-20 @ 11:17)    Ophtho- no endophthalmitis   #Flu + AH1, Alveolar hemorrhage, ?superimposed atypical PNA (imaging not really consistent with bacterial PNA). Recent bronch 1/20 negative for PCP, Fungal, etc, previous bronch cx with yeast (likely oral contaminant) since GMS neg    2/3 Bronch   No organisms seen, 2/3 cytopath Rare atypical cells, few ciliated bronchial cells, alveolar macrophages and inflammatory cells, mainly neutrophils.    Strep urine Ag neg, serum crypto Ag neg, CMV PCR undetectable, AFB neg    Quantiferon indeterminate    Lactate Dehydrogenase, Serum: 607 (02.03.20 @ 04:30)    Procalcitonin, Serum: 1.86:    CTA chest showing no PE but showing interval development of multifocal bilateral groundglass opacities as well as new moderate to severe bronchiectasis in the right lung,   1/13 CT b/l GGOs, compatible with intersitial edema      Serum Pro-Brain Natriuretic Peptide: 722 pg/mL (01.31.20 @ 09:25)<-- Serum Pro-Brain Natriuretic Peptide: 345 pg/mL (01.13.20 @ 12:10)    During last hospitalization: bronch cx bacterial NG, +yeast, no ID, neg legionella, + MRSA PCR    MRSA PCR Result.: Positive (02-01-20 @ 20:40)  #Severe sepsis on admission P>90, WBC 19, RR>20, lactic acidosis Lactate, Blood: 2.9 mmol/L (01-31-20 @ 09:25)    afebrile   #Elevated Alk ph, transaminitis  #LLE wound, not infection     12/7 WCX MSSA, Kleb, bacteroides    8/2019 MRSA in a wound   #Immunosuppressed: autoimmune hepatitis on prednisone and tacrolimus    RECOMMENDATIONS  - caspo completed 14 days 2/27  - Meropenem 1g q8h IV , if hemodynamic compromise, dose amikacin 5mg/kg x1 to cover CRE  - grave prognosis    Spectra [id number]

## 2020-02-28 NOTE — CONSULT NOTE ADULT - SUBJECTIVE AND OBJECTIVE BOX
REQUESTED OF: DR Zamorano    Chart reviewed, Hospital Day 28    DONI ELDER 75yFemale  HPI:  73y/o F w/ hx of asthma, COPD not on home O2, autoimmune hepatitis on prednisone and tacrolimus, heterozygous prothrombin gene mutation with hx of PE and DVT on coumadin with recent hospitalization in January 2020 with a diagnosis of pneumonia presents for worsening SOB, hemoptysis and cough. Everything started yesterday night when patient was in bed, started to feel shortness of breath and had many episodes of hemoptysis with clots, she also had substernal chest pain non radiating, moderate in intensity that worsens on coughing. She denies fever but endorses chills. She also admits for lightheadedness that occurred yesterday, no HA, abd pain, dysuria or paresthesias. She had 2 loose bowel movements since yesterday with some blood in the stools that she attributes to her hemorrhoids. She stopped Coumadin couple of days ago since her INR was supratherapeutic. She is from [facility] short term and also mentions that her  and another family member have the flu and she was exposed to them. She did not have the flu shot this season.  In ED, temp was 100.1 rectally, tachycardic ( sinus) , she was saturating to 70s on non rebreather and her SaO2 went up to 95%. ABG showing respiratory alkalosis initially and then corrected after BIPAP placement and correction of RR.  CTA chest showing no PE but showing interval development of multifocal bilateral groundglass opacities as well as new moderate to severe bronchiectasis in the right lung. Findings are concerning for an acute infectious/inflammatory process. 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  She has had long, complicated hospital course (see HPI), including intubation x 2, finding of alveolar hemorrhage, candidemia, REJI on CKD III (now resolved), L saphenous DVT, bleeding rectal ulcer, and IVC filter placement.  Hematology consulted for pancytopenia - likely multifactorial. Rectal bleeding investigation pending due to overall poor condition    ID input:  caspo ends today 2/27 to complete 14 days from cleared BCX; f/u bronch GNR, possible colonization but given many PMNs, ok to start meropenem 1g q8h IV for now     nephro input appreciated signed off 2/19      PAST MEDICAL & SURGICAL HISTORY:  Prothrombin gene mutation  Autoimmune hepatitis  Other chronic pulmonary embolism without acute cor pulmonale  Essential hypertension  Autoimmune hepatitis treated with steroids  Asthma  DVT (deep venous thrombosis)  S/P debridement  History of back surgery      Subjective and Objective:  Today,  Discussed with     Focused Palliative Care Evaluation:                   Symptoms:                                      Pain                                     Dyspnea                                     N/V                                     Appetite                                     Anxiety                                     Other _____________________                     Support Devices:              PHYSICAL EXAM:      Constitutional:    Eyes:    ENMT:    Neck:    Breasts:    Back:    Respiratory:    Cardiovascular:    Gastrointestinal:    Genitourinary:    Rectal:    Extremities:    Vascular:    Neurological:    Skin:    Lymph Nodes:    Musculoskeletal:    Psychiatric:        T(C): 37.8, Max: 37.8 (00:00)  HR: 80 (72 - 120)  BP: 94/56 (61/41 - 175/94)  RR: 16 (12 - 35)  SpO2: 100% (87% - 100%)      LABS/STUDIES:  02-28    145  |  107  |  40<H>  ----------------------------<  304<H>  4.4   |  26  |  0.7 GFR 85    Ca    7.2<L>      28 Feb 2020 05:00  Mg     1.9     02-27    TPro  4.3<L>  /  Alb  1.9<L>  /  TBili  0.7  /  DBili  x   /  AST  40  /  ALT  61<H>  /  AlkPhos  177<H>  02-28    Lactate 3.1                        7.1    6.50  )-----------( 63       ( 28 Feb 2020 05:00 )             22.1   XAM:  XR CHEST PORTABLE ROUTINE 1V            PROCEDURE DATE:  02/28/2020      INTERPRETATION:  Clinical History / Reason for exam: Intubated.    Comparison : Chest radiograph 2/27/2020.    Technique/Positioning: Frontal portable.    Findings:    Support devices: ET tube 1.5 cm above the baljinder. Enteric tube courses below the hemidiaphragm with tip not visualized. Telemetry leads overlie the thorax.    Cardiac/mediastinum/hilum: Stable.    Lung parenchyma/Pleura: Increasing bilateral basilar predominant opacities and effusions. Calcified plaques, unchanged    Skeleton/soft tissues: Unchanged.    Impression:     Increasing bilateral basilar predominant opacities and effusions.  ET tube less than 2 cm above baljinder. Consider repositioning.        RUSSELL SALMERON M.D., RESIDENT RADIOLOGIST  This document has been electronically signed.  ARTURO REESE M.D., ATTENDING RADIOLOGIST  This document has been electronically signed. Feb 28 2020  9:09AM                MEDICATIONS  (STANDING):  albuterol/ipratropium for Nebulization 3 milliLiter(s) Nebulizer every 6 hours  calcitriol  Solution 0.25 MICROGram(s) Oral daily  calcium carbonate    500 mG (Tums) Chewable 3 Tablet(s) Chew every 8 hours  chlorhexidine 0.12% Liquid 15 milliLiter(s) Oral Mucosa two times a day  chlorhexidine 4% Liquid 1 Application(s) Topical <User Schedule>  cyanocobalamin 1000 MICROGram(s) Oral daily  dextrose 5%. 1000 milliLiter(s) (50 mL/Hr) IV Continuous <Continuous>  dextrose 50% Injectable 12.5 Gram(s) IV Push once  dextrose 50% Injectable 25 Gram(s) IV Push once  dextrose 50% Injectable 25 Gram(s) IV Push once  fentaNYL   Infusion. 0.5 MICROgram(s)/kG/Hr (3.75 mL/Hr) IV Continuous <Continuous>  gabapentin 300 milliGRAM(s) Oral at bedtime  influenza   Vaccine 0.5 milliLiter(s) IntraMuscular once  insulin lispro (HumaLOG) corrective regimen sliding scale   SubCutaneous every 6 hours  meropenem  IVPB      meropenem  IVPB 1000 milliGRAM(s) IV Intermittent every 8 hours  methylPREDNISolone sodium succinate Injectable 60 milliGRAM(s) IV Push every 12 hours  midazolam Infusion 0.02 mG/kG/Hr (1.444 mL/Hr) IV Continuous <Continuous>  multivitamin/minerals 1 Tablet(s) Oral daily  mupirocin 2% Ointment 1 Application(s) Topical two times a day  norepinephrine Infusion 0.05 MICROgram(s)/kG/Min (6.769 mL/Hr) IV Continuous <Continuous>  pantoprazole   Suspension 40 milliGRAM(s) Oral before breakfast    MEDICATIONS  (PRN):  acetaminophen   Tablet .. 650 milliGRAM(s) Oral every 6 hours PRN Temp greater or equal to 38C (100.4F)  dextrose 40% Gel 15 Gram(s) Oral once PRN Blood Glucose LESS THAN 70 milliGRAM(s)/deciliter  glucagon  Injectable 1 milliGRAM(s) IntraMuscular once PRN Glucose LESS THAN 70 milligrams/deciliter          iStop:         PPS  Level    __________       Note PPS = Palliative Performance Scale; (c)2001, [employer]       Range from 100% meaning Full ambulation/self-care/intake/Level of Consciousness                                                                              to        10% meaning Bedbound/Unable to do any activity/extensive disease /Total Care/ No PO intake/ LOC=Full/drowsy/+/-confusion        (0% = death)                     Prior to acute illness, patient's functionality reportedly REQUESTED OF: DR Zamorano    Chart reviewed, Hospital Day 28    DONI ELDER 75yFemale  HPI:  75y/o F w/ hx of asthma, COPD not on home O2, autoimmune hepatitis on prednisone and tacrolimus, heterozygous prothrombin gene mutation with hx of PE and DVT on coumadin with recent hospitalization in January 2020 with a diagnosis of pneumonia presents for worsening SOB, hemoptysis and cough. Everything started yesterday night when patient was in bed, started to feel shortness of breath and had many episodes of hemoptysis with clots, she also had substernal chest pain non radiating, moderate in intensity that worsens on coughing. She denies fever but endorses chills. She also admits for lightheadedness that occurred yesterday, no HA, abd pain, dysuria or paresthesias. She had 2 loose bowel movements since yesterday with some blood in the stools that she attributes to her hemorrhoids. She stopped Coumadin couple of days ago since her INR was supratherapeutic. She is from [facility] short term and also mentions that her  and another family member have the flu and she was exposed to them. She did not have the flu shot this season.  In ED, temp was 100.1 rectally, tachycardic ( sinus) , she was saturating to 70s on non rebreather and her SaO2 went up to 95%. ABG showing respiratory alkalosis initially and then corrected after BIPAP placement and correction of RR.  CTA chest showing no PE but showing interval development of multifocal bilateral groundglass opacities as well as new moderate to severe bronchiectasis in the right lung. Findings are concerning for an acute infectious/inflammatory process. 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  She has had long, complicated hospital course (see HPI), including intubation x 2, finding of alveolar hemorrhage, candidemia, REJI on CKD III (now resolved), L saphenous DVT, bleeding rectal ulcer, and IVC filter placement.  Hematology consulted for pancytopenia - likely multifactorial. Rectal bleeding investigation pending due to overall poor condition    ID input:  caspo ends today 2/27 to complete 14 days from cleared BCX; f/u bronch GNR, possible colonization but given many PMNs, ok to start meropenem 1g q8h IV for now     nephro input appreciated signed off 2/19      PAST MEDICAL & SURGICAL HISTORY:  Prothrombin gene mutation  Autoimmune hepatitis  Other chronic pulmonary embolism without acute cor pulmonale  Essential hypertension  Autoimmune hepatitis treated with steroids  Asthma  DVT (deep venous thrombosis)  S/P debridement  History of back surgery    Focused Palliative Care Evaluation:                   Symptoms: intuabted with no evidence of the below symptoms                                     Pain                                     Dyspnea                                     N/V                                     Appetite                                     Anxiety                                     Other _____________________                     Support Devices:              PHYSICAL EXAM:      Constitutional: intuabted, sedated, no acute distress  Eyes: eyes closed  ENMT: ET tube in place  Neck: bruising noted on upper chest  Respiratory: vent sounds, no wheezing, no accessory muscle use  Cardiovascular:  mildy tachycardic  Neurological: sedated and unable to participate in exam  Skin: bruising noted on upper chest  Psychiatric: unable to determine as patient sedated      T(C): 37.8, Max: 37.8 (00:00)  HR: 80 (72 - 120)  BP: 94/56 (61/41 - 175/94)  RR: 16 (12 - 35)  SpO2: 100% (87% - 100%)      LABS/STUDIES:  02-28    145  |  107  |  40<H>  ----------------------------<  304<H>  4.4   |  26  |  0.7 GFR 85    Ca    7.2<L>      28 Feb 2020 05:00  Mg     1.9     02-27    TPro  4.3<L>  /  Alb  1.9<L>  /  TBili  0.7  /  DBili  x   /  AST  40  /  ALT  61<H>  /  AlkPhos  177<H>  02-28    Lactate 3.1                        7.1    6.50  )-----------( 63       ( 28 Feb 2020 05:00 )             22.1   XAM:  XR CHEST PORTABLE ROUTINE 1V            PROCEDURE DATE:  02/28/2020      INTERPRETATION:  Clinical History / Reason for exam: Intubated.    Comparison : Chest radiograph 2/27/2020.    Technique/Positioning: Frontal portable.    Findings:    Support devices: ET tube 1.5 cm above the baljinder. Enteric tube courses below the hemidiaphragm with tip not visualized. Telemetry leads overlie the thorax.    Cardiac/mediastinum/hilum: Stable.    Lung parenchyma/Pleura: Increasing bilateral basilar predominant opacities and effusions. Calcified plaques, unchanged    Skeleton/soft tissues: Unchanged.    Impression:     Increasing bilateral basilar predominant opacities and effusions.  ET tube less than 2 cm above baljinder. Consider repositioning.        RUSSELL SALMERON M.D., RESIDENT RADIOLOGIST  This document has been electronically signed.  ARTURO REESE M.D., ATTENDING RADIOLOGIST  This document has been electronically signed. Feb 28 2020  9:09AM                MEDICATIONS  (STANDING):  albuterol/ipratropium for Nebulization 3 milliLiter(s) Nebulizer every 6 hours  calcitriol  Solution 0.25 MICROGram(s) Oral daily  calcium carbonate    500 mG (Tums) Chewable 3 Tablet(s) Chew every 8 hours  chlorhexidine 0.12% Liquid 15 milliLiter(s) Oral Mucosa two times a day  chlorhexidine 4% Liquid 1 Application(s) Topical <User Schedule>  cyanocobalamin 1000 MICROGram(s) Oral daily  dextrose 5%. 1000 milliLiter(s) (50 mL/Hr) IV Continuous <Continuous>  dextrose 50% Injectable 12.5 Gram(s) IV Push once  dextrose 50% Injectable 25 Gram(s) IV Push once  dextrose 50% Injectable 25 Gram(s) IV Push once  fentaNYL   Infusion. 0.5 MICROgram(s)/kG/Hr (3.75 mL/Hr) IV Continuous <Continuous>  gabapentin 300 milliGRAM(s) Oral at bedtime  influenza   Vaccine 0.5 milliLiter(s) IntraMuscular once  insulin lispro (HumaLOG) corrective regimen sliding scale   SubCutaneous every 6 hours  meropenem  IVPB      meropenem  IVPB 1000 milliGRAM(s) IV Intermittent every 8 hours  methylPREDNISolone sodium succinate Injectable 60 milliGRAM(s) IV Push every 12 hours  midazolam Infusion 0.02 mG/kG/Hr (1.444 mL/Hr) IV Continuous <Continuous>  multivitamin/minerals 1 Tablet(s) Oral daily  mupirocin 2% Ointment 1 Application(s) Topical two times a day  norepinephrine Infusion 0.05 MICROgram(s)/kG/Min (6.769 mL/Hr) IV Continuous <Continuous>  pantoprazole   Suspension 40 milliGRAM(s) Oral before breakfast    MEDICATIONS  (PRN):  acetaminophen   Tablet .. 650 milliGRAM(s) Oral every 6 hours PRN Temp greater or equal to 38C (100.4F)  dextrose 40% Gel 15 Gram(s) Oral once PRN Blood Glucose LESS THAN 70 milliGRAM(s)/deciliter  glucagon  Injectable 1 milliGRAM(s) IntraMuscular once PRN Glucose LESS THAN 70 milligrams/deciliter          iStop:   Reference #: [id number]    Others' Prescriptions  Patient Name: Doni HudsonBirth Date: 1945  Address: [address] [place] [id number]: Female  Rx Written	Rx Dispensed	Drug	Quantity	Days Supply	Prescriber Name	Payment Method	Dispenser  2020/01/21	2020/01/21	oxycodone hcl 10 mg tablet	40	10	Erik Fragoso MD	Insurance	[employer]    Patient Name: Doni HudsonBirth Date: 1945  Address: [address] [place] [id number]: Female  Rx Written	Rx Dispensed	Drug	Quantity	Days Supply	Prescriber Name	Payment Method	Dispenser  2019/12/19	2019/12/20	oxycodone-acetaminophen 5-325 mg tab	30	5	Enrike Kang MD	[facility] #[id number]  2019/12/12	2019/12/13	oxycodone-acetaminophen 5-325 mg tab	30	5	Enrike Kang MD	[facility] #[id number]  2019/12/08	2019/12/09	oxycodone-acetaminophen 5-325 mg tab	28	28	[facility] #[id number]  2019/11/15	2019/11/15	oxycodone-acetaminophen 5-325 mg tab	10	2	[facility] #[id number]  2019/10/31	2019/11/01	oxycodone-acetaminophen 5-325 mg tab	30	6	Enrike Kang MD	[facility] #[id number]  2019/08/01	2019/08/09	oxycodone-acetaminophen 5-325 mg tab	30	5	Enrike Kang MD	Insurance	[facility] #[id number]  2019/07/25	2019/07/26	oxycodone-acetaminophen 5-325 mg tab	30	5	Enrike Kang MD	Insurance	[facility] #[id number]  2019/07/16	2019/07/17	oxycodone-acetaminophen 5-325 mg tab	40	7	Mary Stone	Insurance	[facility] #[id number]  2019/03/21	2019/03/23	oxycodone-acetaminophen 5-325 mg tab	21	7	Pamela Johnson MD	[facility] #[id number]      PPS  Level    10%       Note PPS = Palliative Performance Scale; (c)2001, [employer]       Range from 100% meaning Full ambulation/self-care/intake/Level of Consciousness                                                                              to        10% meaning Bedbound/Unable to do any activity/extensive disease /Total Care/ No PO intake/ LOC=Full/drowsy/+/-confusion        (0% = death)                     Prior to acute illness, patient's functionality unknown

## 2020-02-28 NOTE — CONSULT NOTE ADULT - PROVIDER SPECIALTY LIST ADULT
Ophthalmology
Critical Care
Gastroenterology
Intervent Radiology
Palliative Care
Burn
Gastroenterology
Heme/Onc
Nephrology
Neurology
Physiatry
Infectious Disease

## 2020-02-28 NOTE — PROGRESS NOTE ADULT - SUBJECTIVE AND OBJECTIVE BOX
OVERNIGHT EVENTS: still intubated, ventilated, fentanyl, versed, levophed 0.08    Vital Signs Last 24 Hrs  T(C): 37.8 (28 Feb 2020 00:00), Max: 37.8 (28 Feb 2020 00:00)  T(F): 100.1 (28 Feb 2020 00:00), Max: 100.1 (28 Feb 2020 00:00)  HR: 84 (28 Feb 2020 07:00) (72 - 120)  BP: 101/60 (28 Feb 2020 07:00) (61/41 - 175/94)  BP(mean): 73 (28 Feb 2020 07:00) (51 - 145)  RR: 17 (28 Feb 2020 07:00) (12 - 36)  SpO2: 100% (28 Feb 2020 07:00) (87% - 100%)    PHYSICAL EXAMINATION:    GENERAL: Chronically ill looking    HEENT: Head is normocephalic and atraumatic. Extraocular muscles are intact. Mucous membranes are moist.    NECK: Supple.    LUNGS: r side crackles    HEART: Regular rate and rhythm without murmur.    ABDOMEN: Soft, nontender, and nondistended.      EXTREMITIES: diffuse buises/ ecchymosis    NEUROLOGIC: sedated    SKIN: No ulceration or induration present.      LABS:                        7.1    6.50  )-----------( 63       ( 28 Feb 2020 05:00 )             22.1     02-28    145  |  107  |  40<H>  ----------------------------<  304<H>  4.4   |  26  |  0.7    Ca    7.2<L>      28 Feb 2020 05:00  Mg     1.9     02-27    TPro  4.3<L>  /  Alb  1.9<L>  /  TBili  0.7  /  DBili  x   /  AST  40  /  ALT  61<H>  /  AlkPhos  177<H>  02-28        ABG - ( 28 Feb 2020 04:01 )  pH, Arterial: 7.39  pH, Blood: x     /  pCO2: 45    /  pO2: 73    / HCO3: 27    / Base Excess: 1.9   /  SaO2: 95          450/16/60/5      CARDIAC MARKERS ( 28 Feb 2020 05:00 )  x     / 0.11 ng/mL / x     / x     / x      CARDIAC MARKERS ( 27 Feb 2020 14:18 )  x     / 0.13 ng/mL / x     / x     / x      CARDIAC MARKERS ( 27 Feb 2020 10:12 )  x     / 0.12 ng/mL / 118 U/L / x     / 3.7 ng/mL        Serum Pro-Brain Natriuretic Peptide: 1007 pg/mL (02-27-20 @ 10:12)    Lactate, Blood: 3.1 mmol/L (02-28-20 @ 05:00)  Lactate, Blood: 1.5 mmol/L (02-27-20 @ 18:34)  Lactate, Blood: 2.2 mmol/L (02-27-20 @ 14:18)  Lactate, Blood: 2.6 mmol/L (02-27-20 @ 10:12)          02-27-20 @ 07:01  -  02-28-20 @ 07:00  --------------------------------------------------------  IN: 4181.9 mL / OUT: 1490 mL / NET: 2691.9 mL        MICROBIOLOGY:  Culture Results:   Numerous Klebsiella pneumoniae (02-26 @ 14:45)  Culture Results:   Testing in progress (02-26 @ 14:45)      MEDICATIONS  (STANDING):  albuterol/ipratropium for Nebulization 3 milliLiter(s) Nebulizer every 6 hours  calcitriol  Solution 0.25 MICROGram(s) Oral daily  calcium carbonate    500 mG (Tums) Chewable 3 Tablet(s) Chew every 8 hours  chlorhexidine 0.12% Liquid 15 milliLiter(s) Oral Mucosa two times a day  chlorhexidine 4% Liquid 1 Application(s) Topical <User Schedule>  cyanocobalamin 1000 MICROGram(s) Oral daily  dextrose 5%. 1000 milliLiter(s) (50 mL/Hr) IV Continuous <Continuous>  dextrose 50% Injectable 12.5 Gram(s) IV Push once  dextrose 50% Injectable 25 Gram(s) IV Push once  dextrose 50% Injectable 25 Gram(s) IV Push once  fentaNYL   Infusion. 0.5 MICROgram(s)/kG/Hr (3.75 mL/Hr) IV Continuous <Continuous>  gabapentin 300 milliGRAM(s) Oral at bedtime  influenza   Vaccine 0.5 milliLiter(s) IntraMuscular once  insulin lispro (HumaLOG) corrective regimen sliding scale   SubCutaneous every 6 hours  meropenem  IVPB      meropenem  IVPB 1000 milliGRAM(s) IV Intermittent every 8 hours  methylPREDNISolone sodium succinate Injectable 60 milliGRAM(s) IV Push daily  midazolam Infusion 0.02 mG/kG/Hr (1.444 mL/Hr) IV Continuous <Continuous>  multivitamin/minerals 1 Tablet(s) Oral daily  mupirocin 2% Ointment 1 Application(s) Topical two times a day  norepinephrine Infusion 0.05 MICROgram(s)/kG/Min (6.769 mL/Hr) IV Continuous <Continuous>  pantoprazole   Suspension 40 milliGRAM(s) Oral before breakfast    MEDICATIONS  (PRN):  acetaminophen   Tablet .. 650 milliGRAM(s) Oral every 6 hours PRN Temp greater or equal to 38C (100.4F)  dextrose 40% Gel 15 Gram(s) Oral once PRN Blood Glucose LESS THAN 70 milliGRAM(s)/deciliter  glucagon  Injectable 1 milliGRAM(s) IntraMuscular once PRN Glucose LESS THAN 70 milligrams/deciliter      RADIOLOGY & ADDITIONAL STUDIES:

## 2020-02-28 NOTE — PROGRESS NOTE ADULT - ASSESSMENT
73 y/o female with pmhx of asthma, HTN,  autoimmune hepatitis, heterozygous prothrombin gene mutation with hx of PE and DVT on coumadin admitted for SOB     Acute hypoxic resp failure with hypoxia due to influenza, HCAP  Septic shock in immunocompromised patient, Severe sepsis present on admission   - completed course of treatment- antibiotics and antiviral completed for initial infection  - MRSA VAP treated with Zyvox for 10 days   - now intubated again and on pressors, ? PE, HCAP pneumonia, GNR on BAL  - Klebsiella n BAL  - GFF placed   - remains on IV Solu-medrol  - ID f/u noted started on Meropenem  - continue Duoneb q6h and q4h PRN    Acute Blood Loss Anemia  - had rectal tube earlier on this adm which was discontinued due to ulcer  - has a hx of hemorrhoids   - last colonoscopy about 1 year ago with Dr Washington  - on Protonix  - has had multiple PRBC's this adm  - GI f/u noted    Candedemia  - central line change noted  - cultures negative x2  - 2D echo no evidence of vegetation   - ophth evaluation noted, no clinical evidence, low suspicion for ocular fungemia/endogenous endophthalmitis   - completed treatment  2/27    REJI on CKD 3  - creatinine trending down  - renal f/u noted  - urine output noted  - continue to monitor  - follow renal recommendations    Autoimmune hepatitis  - On prednisone 60 mg PO qd and Tacrolimus at home  - now on Solu-medrol  - Tacrolimus held    HTN  - now off Labetolol    Heterozygous prothrombin gene mutation with hx of PE and DVT on coumadin:  - Coumadin had been held for alveolar hemorrhage     Hx of asthma  - on Montelukast qd    Multiple Skin tears/ wounds  - local care  - elevate extremities    Dysphagia  - failed swallowing evaluation today  - continue enteral feedings    Protein Malnutrition  - Alb trending down to 1.9  - tolerating enteral feedings  - add protein supplement    Diet: Enteral feedings   GI PPx: Protonix  DVT PPx: sequentials  Activity: bedrest  Dispo: readmitted to hospital from  rehab at [facility], remains ICU      Continue supportive measures, patient remains acutely ill,  Overall prognosis poor.

## 2020-02-29 NOTE — PROGRESS NOTE ADULT - SUBJECTIVE AND OBJECTIVE BOX
PATIENT:  DONI PATRICK  [id number]    CHIEF COMPLAINT:  Patient is a 75y old  Female who presents with a chief complaint of SOB and desaturation (2020 09:19)      INTERVAL HISTORYOVERNIGHT EVENTS:          MEDICATIONS:  MEDICATIONS  (STANDING):  albuterol/ipratropium for Nebulization 3 milliLiter(s) Nebulizer every 6 hours  calcitriol  Solution 0.25 MICROGram(s) Oral daily  calcium carbonate    500 mG (Tums) Chewable 3 Tablet(s) Chew every 8 hours  chlorhexidine 0.12% Liquid 15 milliLiter(s) Oral Mucosa two times a day  chlorhexidine 4% Liquid 1 Application(s) Topical <User Schedule>  cyanocobalamin 1000 MICROGram(s) Oral daily  dextrose 5%. 1000 milliLiter(s) (50 mL/Hr) IV Continuous <Continuous>  fentaNYL   Infusion. 0.5 MICROgram(s)/kG/Hr (3.75 mL/Hr) IV Continuous <Continuous>  gabapentin 300 milliGRAM(s) Oral at bedtime  influenza   Vaccine 0.5 milliLiter(s) IntraMuscular once  insulin lispro (HumaLOG) corrective regimen sliding scale   SubCutaneous three times a day before meals  meropenem  IVPB      meropenem  IVPB 1000 milliGRAM(s) IV Intermittent every 8 hours  methylPREDNISolone sodium succinate Injectable 60 milliGRAM(s) IV Push every 12 hours  midazolam Infusion 0.02 mG/kG/Hr (1.444 mL/Hr) IV Continuous <Continuous>  multivitamin/minerals 1 Tablet(s) Oral daily  mupirocin 2% Ointment 1 Application(s) Topical two times a day  norepinephrine Infusion 0.05 MICROgram(s)/kG/Min (6.769 mL/Hr) IV Continuous <Continuous>  pantoprazole   Suspension 40 milliGRAM(s) Oral before breakfast    MEDICATIONS  (PRN):  acetaminophen   Tablet .. 650 milliGRAM(s) Oral every 6 hours PRN Temp greater or equal to 38C (100.4F)  glucagon  Injectable 1 milliGRAM(s) IntraMuscular once PRN Glucose LESS THAN 70 milligrams/deciliter      ALLERGIES:  Allergies    No Known Allergies    Intolerances        OBJECTIVE:  ICU Vital Signs Last 24 Hrs  T(C): 34.7 (2020 08:00), Max: 36.6 (2020 16:00)  T(F): 94.5 (2020 08:00), Max: 97.8 (2020 16:00)  HR: 58 (2020 11:15) (50 - 92)  BP: 106/61 (2020 11:15) (87/55 - 153/70)  BP(mean): 72 (2020 11:15) (66 - 107)  ABP: --  ABP(mean): --  RR: 16 (2020 11:15) (15 - 40)  SpO2: 99% (2020 11:15) (98% - 100%)    Mode: AC/ CMV (Assist Control/ Continuous Mandatory Ventilation)  RR (machine): 16  TV (machine): 450  FiO2: 40  PEEP: 5  ITime: 1  MAP: 12  PIP: 31    Adult Advanced Hemodynamics Last 24 Hrs  CVP(mm Hg): --  CVP(cm H2O): --  CO: --  CI: --  PA: --  PA(mean): --  PCWP: --  SVR: --  SVRI: --  PVR: --  PVRI: --  CAPILLARY BLOOD GLUCOSE      POCT Blood Glucose.: 205 mg/dL (2020 05:41)  POCT Blood Glucose.: 239 mg/dL (2020 01:56)  POCT Blood Glucose.: 172 mg/dL (2020 20:07)  POCT Blood Glucose.: 233 mg/dL (2020 18:52)  POCT Blood Glucose.: 282 mg/dL (2020 17:20)  POCT Blood Glucose.: 313 mg/dL (2020 12:50)    CAPILLARY BLOOD GLUCOSE      POCT Blood Glucose.: 205 mg/dL (2020 05:41)    I&O's Summary    2020 07:01  -  2020 07:00  --------------------------------------------------------  IN: 2693.2 mL / OUT: 959 mL / NET: 1734.2 mL      Daily     Daily Weight in k.9 (2020 04:00)    PHYSICAL EXAMINATION:  General: intubated, sedated  HEENT: PERRLA  Neurology: sedated  Respiratory: crackles b/l  CV: esperanza, S1S2, no murmurs, rubs or gallops  Abdominal: Soft, ND  Extremities: peripheral edema, + peripheral pulses  Incisions:   Tubes: intubated    LABS:  ABG - ( 2020 04:08 )  pH, Arterial: 7.37  pH, Blood: x     /  pCO2: 46    /  pO2: 145   / HCO3: 27    / Base Excess: 1.4   /  SaO2: 99                                      8.3    4.01  )-----------( 50       ( 2020 04:40 )             26.1         141  |  103  |  45<H>  ----------------------------<  226<H>  4.6   |  25  |  0.7    Ca    7.6<L>      2020 04:40    TPro  4.4<L>  /  Alb  1.9<L>  /  TBili  0.8  /  DBili  x   /  AST  27  /  ALT  48<H>  /  AlkPhos  156<H>      LIVER FUNCTIONS - ( 2020 04:40 )  Alb: 1.9 g/dL / Pro: 4.4 g/dL / ALK PHOS: 156 U/L / ALT: 48 U/L / AST: 27 U/L / GGT: x           PT/INR - ( 2020 04:40 )   PT: 13.50 sec;   INR: 1.17 ratio         PTT - ( 2020 04:40 )  PTT:24.5 sec    CARDIAC MARKERS ( 2020 05:00 )  x     / 0.11 ng/mL / x     / x     / x      CARDIAC MARKERS ( 2020 14:18 )  x     / 0.13 ng/mL / x     / x     / x              TELEMETRY:     EKG:     IMAGING: PATIENT:  DONI PATRICK  [id number]    CHIEF COMPLAINT:  Patient is a 75y old  Female who presents with a chief complaint of SOB and desaturation (2020 09:19)      INTERVAL HISTORYOVERNIGHT EVENTS:          MEDICATIONS:  MEDICATIONS  (STANDING):  albuterol/ipratropium for Nebulization 3 milliLiter(s) Nebulizer every 6 hours  calcitriol  Solution 0.25 MICROGram(s) Oral daily  calcium carbonate    500 mG (Tums) Chewable 3 Tablet(s) Chew every 8 hours  chlorhexidine 0.12% Liquid 15 milliLiter(s) Oral Mucosa two times a day  chlorhexidine 4% Liquid 1 Application(s) Topical <User Schedule>  cyanocobalamin 1000 MICROGram(s) Oral daily  dextrose 5%. 1000 milliLiter(s) (50 mL/Hr) IV Continuous <Continuous>  fentaNYL   Infusion. 0.5 MICROgram(s)/kG/Hr (3.75 mL/Hr) IV Continuous <Continuous>  gabapentin 300 milliGRAM(s) Oral at bedtime  influenza   Vaccine 0.5 milliLiter(s) IntraMuscular once  insulin lispro (HumaLOG) corrective regimen sliding scale   SubCutaneous three times a day before meals  meropenem  IVPB      meropenem  IVPB 1000 milliGRAM(s) IV Intermittent every 8 hours  methylPREDNISolone sodium succinate Injectable 60 milliGRAM(s) IV Push every 12 hours  midazolam Infusion 0.02 mG/kG/Hr (1.444 mL/Hr) IV Continuous <Continuous>  multivitamin/minerals 1 Tablet(s) Oral daily  mupirocin 2% Ointment 1 Application(s) Topical two times a day  norepinephrine Infusion 0.05 MICROgram(s)/kG/Min (6.769 mL/Hr) IV Continuous <Continuous>  pantoprazole   Suspension 40 milliGRAM(s) Oral before breakfast    MEDICATIONS  (PRN):  acetaminophen   Tablet .. 650 milliGRAM(s) Oral every 6 hours PRN Temp greater or equal to 38C (100.4F)  glucagon  Injectable 1 milliGRAM(s) IntraMuscular once PRN Glucose LESS THAN 70 milligrams/deciliter      ALLERGIES:  Allergies    No Known Allergies    Intolerances        OBJECTIVE:  ICU Vital Signs Last 24 Hrs  T(C): 34.7 (2020 08:00), Max: 36.6 (2020 16:00)  T(F): 94.5 (2020 08:00), Max: 97.8 (2020 16:00)  HR: 58 (2020 11:15) (50 - 92)  BP: 106/61 (2020 11:15) (87/55 - 153/70)  BP(mean): 72 (2020 11:15) (66 - 107)  ABP: --  ABP(mean): --  RR: 16 (2020 11:15) (15 - 40)  SpO2: 99% (2020 11:15) (98% - 100%)    Mode: AC/ CMV (Assist Control/ Continuous Mandatory Ventilation)  RR (machine): 16  TV (machine): 450  FiO2: 40  PEEP: 5  ITime: 1  MAP: 12  PIP: 31        POCT Blood Glucose.: 205 mg/dL (2020 05:41)  POCT Blood Glucose.: 239 mg/dL (2020 01:56)  POCT Blood Glucose.: 172 mg/dL (2020 20:07)  POCT Blood Glucose.: 233 mg/dL (2020 18:52)  POCT Blood Glucose.: 282 mg/dL (2020 17:20)  POCT Blood Glucose.: 313 mg/dL (2020 12:50)    CAPILLARY BLOOD GLUCOSE      POCT Blood Glucose.: 205 mg/dL (2020 05:41)    I&O's Summary    2020 07:01  -  2020 07:00  --------------------------------------------------------  IN: 2693.2 mL / OUT: 959 mL / NET: 1734.2 mL      Daily     Daily Weight in k.9 (2020 04:00)    PHYSICAL EXAMINATION:  General: intubated, sedated  HEENT: PERRLA  Neurology: sedated  Respiratory: crackles b/l  CV: esperanza, S1S2, no murmurs, rubs or gallops  Abdominal: Soft, ND  Extremities: peripheral edema, + peripheral pulses  Incisions:   Tubes: intubated    LABS:  ABG - ( 2020 04:08 )  pH, Arterial: 7.37  pH, Blood: x     /  pCO2: 46    /  pO2: 145   / HCO3: 27    / Base Excess: 1.4   /  SaO2: 99                                      8.3    4.01  )-----------( 50       ( 2020 04:40 )             26.1         141  |  103  |  45<H>  ----------------------------<  226<H>  4.6   |  25  |  0.7    Ca    7.6<L>      2020 04:40    TPro  4.4<L>  /  Alb  1.9<L>  /  TBili  0.8  /  DBili  x   /  AST  27  /  ALT  48<H>  /  AlkPhos  156<H>      LIVER FUNCTIONS - ( 2020 04:40 )  Alb: 1.9 g/dL / Pro: 4.4 g/dL / ALK PHOS: 156 U/L / ALT: 48 U/L / AST: 27 U/L / GGT: x           PT/INR - ( 2020 04:40 )   PT: 13.50 sec;   INR: 1.17 ratio         PTT - ( 2020 04:40 )  PTT:24.5 sec    CARDIAC MARKERS ( 2020 05:00 )  x     / 0.11 ng/mL / x     / x     / x      CARDIAC MARKERS ( 2020 14:18 )  x     / 0.13 ng/mL / x     / x     / x              TELEMETRY:     EKG:     IMAGING:

## 2020-02-29 NOTE — PROGRESS NOTE ADULT - SUBJECTIVE AND OBJECTIVE BOX
Gastroenterology progress note:     Patient is a 75y old  Female who presents with a chief complaint of SOB and desaturation (29 Feb 2020 12:02)       Admitted on: 01-31-20    We are following the patient for lower GIB     Interval History: Patient is still intubated on pressors. No further BM noted by the nurse. Patient is NPO        PAST MEDICAL & SURGICAL HISTORY:  Prothrombin gene mutation  Autoimmune hepatitis  Other chronic pulmonary embolism without acute cor pulmonale  Essential hypertension  Autoimmune hepatitis treated with steroids  Asthma  DVT (deep venous thrombosis)  S/P debridement  History of back surgery      MEDICATIONS  (STANDING):  ALBUTerol    90 MICROgram(s) HFA Inhaler 4 Puff(s) Inhalation every 6 hours  calcitriol  Solution 0.25 MICROGram(s) Oral daily  calcium carbonate    500 mG (Tums) Chewable 3 Tablet(s) Chew every 8 hours  chlorhexidine 0.12% Liquid 15 milliLiter(s) Oral Mucosa two times a day  chlorhexidine 4% Liquid 1 Application(s) Topical <User Schedule>  cyanocobalamin 1000 MICROGram(s) Oral daily  dextrose 5%. 1000 milliLiter(s) (50 mL/Hr) IV Continuous <Continuous>  fentaNYL   Infusion. 0.5 MICROgram(s)/kG/Hr (3.75 mL/Hr) IV Continuous <Continuous>  gabapentin 300 milliGRAM(s) Oral at bedtime  influenza   Vaccine 0.5 milliLiter(s) IntraMuscular once  insulin lispro (HumaLOG) corrective regimen sliding scale   SubCutaneous three times a day before meals  ipratropium 17 MICROgram(s) HFA Inhaler 4 Puff(s) Inhalation every 6 hours  meropenem  IVPB      meropenem  IVPB 1000 milliGRAM(s) IV Intermittent every 8 hours  methylPREDNISolone sodium succinate Injectable 60 milliGRAM(s) IV Push every 12 hours  midazolam Infusion 0.02 mG/kG/Hr (1.444 mL/Hr) IV Continuous <Continuous>  multivitamin/minerals 1 Tablet(s) Oral daily  mupirocin 2% Ointment 1 Application(s) Topical two times a day  norepinephrine Infusion 0.05 MICROgram(s)/kG/Min (6.769 mL/Hr) IV Continuous <Continuous>  pantoprazole   Suspension 40 milliGRAM(s) Oral before breakfast    MEDICATIONS  (PRN):  acetaminophen   Tablet .. 650 milliGRAM(s) Oral every 6 hours PRN Temp greater or equal to 38C (100.4F)  glucagon  Injectable 1 milliGRAM(s) IntraMuscular once PRN Glucose LESS THAN 70 milligrams/deciliter      Allergies  No Known Allergies      Review of Systems: Unobtainable      Physical Examination:  T(C): 35.2 (02-29-20 @ 12:00), Max: 36.6 (02-28-20 @ 16:00)  HR: 74 (02-29-20 @ 14:15) (50 - 94)  BP: 116/67 (02-29-20 @ 14:15) (87/55 - 176/86)  RR: 31 (02-29-20 @ 14:15) (15 - 40)  SpO2: 98% (02-29-20 @ 14:15) (97% - 100%)      02-28-20 @ 07:01  -  02-29-20 @ 07:00  --------------------------------------------------------  IN: 2693.2 mL / OUT: 959 mL / NET: 1734.2 mL    02-29-20 @ 07:01  -  02-29-20 @ 15:12  --------------------------------------------------------  IN: 321.7 mL / OUT: 220 mL / NET: 101.7 mL      Constitutional: Lying in bed  Respiratory: Intubated and vented  Cardiovascular:  S1 S2, Regular rate and rhythm.  Abdominal: Abdomen is soft, symmetric, and non-tender without distention. There are no visible lesions. Bowel sounds are present and normoactive in all four quadrants.   Skin: No cyanosis        Data: (reviewed by attending)                        8.3    4.01  )-----------( 50       ( 29 Feb 2020 04:40 )             26.1     Hgb trend:  8.3  02-29-20 @ 04:40  9.3  02-28-20 @ 20:20  6.3  02-28-20 @ 12:00  7.1  02-28-20 @ 05:00  7.8  02-27-20 @ 18:34  7.7  02-27-20 @ 04:30      02-28-20 @ 07:01  -  02-29-20 @ 07:00  --------------------------------------------------------  IN: 500 mL      02-29    141  |  103  |  45<H>  ----------------------------<  226<H>  4.6   |  25  |  0.7    Ca    7.6<L>      29 Feb 2020 04:40    TPro  4.4<L>  /  Alb  1.9<L>  /  TBili  0.8  /  DBili  x   /  AST  27  /  ALT  48<H>  /  AlkPhos  156<H>  02-29    Liver panel trend:  TBili 0.8   /   AST 27   /   ALT 48   /   AlkP 156   /   Tptn 4.4   /   Alb 1.9    /   DBili --      02-29  TBili 0.7   /   AST 40   /   ALT 61   /   AlkP 177   /   Tptn 4.3   /   Alb 1.9    /   DBili --      02-28  TBili 0.7   /   AST 43   /   ALT 67   /   AlkP 169   /   Tptn 4.1   /   Alb 1.8    /   DBili 0.4      02-27  TBili 1.0   /   AST 54   /   ALT 69   /   AlkP 227   /   Tptn 4.5   /   Alb 2.3    /   DBili --      02-26  TBili 1.1   /   AST 57   /   ALT 60   /   AlkP 173   /   Tptn 4.4   /   Alb 2.5    /   DBili --      02-24  TBili 1.0   /   AST 47   /   ALT 62   /   AlkP 206   /   Tptn 4.9   /   Alb 2.7    /   DBili --      02-23  TBili 1.0   /   AST 86   /   ALT 51   /   AlkP 157   /   Tptn 3.6   /   Alb 2.1    /   DBili --      02-23  TBili 0.7   /   AST 30   /   ALT 39   /   AlkP 168   /   Tptn 4.2   /   Alb 2.4    /   DBili --      02-22  TBili 1.1   /   AST 30   /   ALT 41   /   AlkP 213   /   Tptn 4.5   /   Alb 2.6    /   DBili --      02-21  TBili 1.2   /   AST 26   /   ALT 45   /   AlkP 202   /   Tptn 4.6   /   Alb 2.5    /   DBili --      02-20      PT/INR - ( 29 Feb 2020 04:40 )   PT: 13.50 sec;   INR: 1.17 ratio         PTT - ( 29 Feb 2020 04:40 )  PTT:24.5 sec       Radiology: (reviewed by attending)

## 2020-02-29 NOTE — PROGRESS NOTE ADULT - SUBJECTIVE AND OBJECTIVE BOX
Chart reviewed, patient examined. Pertinent results reviewed.  Case discussed with BONNIE; specialist f/u reviewed- Multiple updated evaluation  HD#29; status post reintubation on     CHIEF COMPLAINT:  Patient is a 75y old  Female who presented with a chief complaint of SOB and desaturation (2020 09:19), Who has had a long and very complicated hospitalization including intubation x2.  She now remains in the ICU intubated, on pressors, and sedated with IV fentanyl.    INTERVAL HISTORYOVERNIGHT EVENTS: Her GI bleeding has eased.          MEDICATIONS:  MEDICATIONS  (STANDING):  albuterol/ipratropium for Nebulization 3 milliLiter(s) Nebulizer every 6 hours  calcitriol  Solution 0.25 MICROGram(s) Oral daily  calcium carbonate    500 mG (Tums) Chewable 3 Tablet(s) Chew every 8 hours  chlorhexidine 0.12% Liquid 15 milliLiter(s) Oral Mucosa two times a day  chlorhexidine 4% Liquid 1 Application(s) Topical <User Schedule>  cyanocobalamin 1000 MICROGram(s) Oral daily  dextrose 5%. 1000 milliLiter(s) (50 mL/Hr) IV Continuous <Continuous>  fentaNYL   Infusion. 0.5 MICROgram(s)/kG/Hr (3.75 mL/Hr) IV Continuous <Continuous>  gabapentin 300 milliGRAM(s) Oral at bedtime  influenza   Vaccine 0.5 milliLiter(s) IntraMuscular once  insulin lispro (HumaLOG) corrective regimen sliding scale   SubCutaneous three times a day before meals  meropenem  IVPB      meropenem  IVPB 1000 milliGRAM(s) IV Intermittent every 8 hours  methylPREDNISolone sodium succinate Injectable 60 milliGRAM(s) IV Push every 12 hours  midazolam Infusion 0.02 mG/kG/Hr (1.444 mL/Hr) IV Continuous <Continuous>  multivitamin/minerals 1 Tablet(s) Oral daily  mupirocin 2% Ointment 1 Application(s) Topical two times a day  norepinephrine Infusion 0.05 MICROgram(s)/kG/Min (6.769 mL/Hr) IV Continuous <Continuous>  pantoprazole   Suspension 40 milliGRAM(s) Oral before breakfast    MEDICATIONS  (PRN):  acetaminophen   Tablet .. 650 milliGRAM(s) Oral every 6 hours PRN Temp greater or equal to 38C (100.4F)  glucagon  Injectable 1 milliGRAM(s) IntraMuscular once PRN Glucose LESS THAN 70 milligrams/deciliter      ALLERGIES:  Allergies    No Known Allergies    Intolerances        OBJECTIVE:  ICU Vital Signs Last 24 Hrs  T(C): 35.8 (2020 14:00), Max: 36.4 (2020 00:00)  T(F): 96.5 (2020 14:00), Max: 97.6 (2020 00:00)  HR: 74 (2020 16:00) (50 - 108)  BP: 95/58 (2020 16:00) (87/55 - 176/86)  BP(mean): 69 (2020 16:00) (65 - 139)  ABP: --  ABP(mean): --  RR: 15 (2020 16:00) (15 - 36)  SpO2: 97% (2020 16:00) (94% - 100%)      Mode: AC/ CMV (Assist Control/ Continuous Mandatory Ventilation)  RR (machine): 16  TV (machine): 450  FiO2: 40- lowered From 60% this AM  PEEP: 5  ITime: 1  MAP: 12  PIP: 31        POCT Blood Glucose.: 205 mg/dL (2020 05:41)  POCT Blood Glucose.: 239 mg/dL (2020 01:56)  POCT Blood Glucose.: 172 mg/dL (2020 20:07)  POCT Blood Glucose.: 233 mg/dL (2020 18:52)  POCT Blood Glucose.: 282 mg/dL (2020 17:20)  POCT Blood Glucose.: 313 mg/dL (2020 12:50)    CAPILLARY BLOOD GLUCOSE    Daily     Daily Weight in k.9 (2020 04:00)    PHYSICAL EXAMINATION:  General: intubated, sedated; ETT/OGT; Multiple ecchymoses and extremity wraps  HEENT: PERRLA  Neurology: sedated  Respiratory: crackles b/l Scattered rhonchi  CV: esperanza, S1S2, no murmurs, rubs or gallops  Abdominal: Soft, ND  Extremities: peripheral edema, + peripheral pulses  Incisions:   Tubes: intubated    LABS:  ABG - ( 2020 04:08 )  pH, Arterial: 7.37  pH, Blood: x     /  pCO2: 46    /  pO2: 145   / HCO3: 27    / Base Excess: 1.4   /  SaO2: 99                                      8.3    4.01  )-----------( 50       ( 2020 04:40 )             26.1         141  |  103  |  45<H>  ----------------------------<  226<H>  4.6   |  25  |  0.7    Ca    7.6<L>      2020 04:40    TPro  4.4<L>  /  Alb  1.9<L>  /  TBili  0.8  /  DBili  x   /  AST  27  /  ALT  48<H>  /  AlkPhos  156<H>      LIVER FUNCTIONS - ( 2020 04:40 )  Alb: 1.9 g/dL / Pro: 4.4 g/dL / ALK PHOS: 156 U/L / ALT: 48 U/L / AST: 27 U/L / GGT: x           PT/INR - ( 2020 04:40 )   PT: 13.50 sec;   INR: 1.17 ratio         PTT - ( 2020 04:40 )  PTT:24.5 sec    CARDIAC MARKERS ( 2020 05:00 )  x     / 0.11 ng/mL / x     / x     / x      CARDIAC MARKERS ( 2020 14:18 )  x     / 0.13 ng/mL / x     / x     / x              TELEMETRY:     EKG:     IMAGING:

## 2020-02-29 NOTE — PROCEDURE NOTE - NSINFORMCONSENT_GEN_A_CORE
Benefits, risks, and possible complications of procedure explained to patient/caregiver who verbalized understanding and gave written consent.
This was an emergent procedure.
Benefits, risks, and possible complications of procedure explained to patient/caregiver who verbalized understanding and gave verbal consent.
Benefits, risks, and possible complications of procedure explained to patient/caregiver who verbalized understanding and gave verbal consent.
Benefits, risks, and possible complications of procedure explained to patient/caregiver who verbalized understanding and gave written consent.

## 2020-02-29 NOTE — PROGRESS NOTE ADULT - ASSESSMENT
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  She has had long, complicated hospital course (see HPI), including intubation x 2, finding of alveolar hemorrhage, candidemia, REJI on CKD III (now resolved), L saphenous DVT, bleeding rectal ulcer, and IVC filter placement.  Hematology consulted for pancytopenia.    1. Pancytopenia-Likely multifactorial, including bone marrow suppression secondary to severe illness, sepsis, antibiotic use, poor nutrition, active bleeding (from alveolar hemorrhage and rectal ulcer), and frequent phlebotomy.   - Recommend continued supportive care.   - Maintain active type and screen.  -  If not bleeding, transfuse for hemoglobin <7 and platelet count < 10.  - wbc trending down , not neutropenic .  - Will monitor , if needed can start neupogen .   -Dialy CBC with differential.    2. 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  Patient is s/p IVC filter placement.    3. Rectal bleed. Needs to be investigated further once she is stabilized.  since drop in H/H is noted , r/o LGIB .     Plan discussed with ICU RN.

## 2020-02-29 NOTE — PROGRESS NOTE ADULT - ASSESSMENT
74y female with PMH of asthma, COPD not on home O2, autoimmune hepatitis on prednisone and tacrolimus, heterozygous prothrombin gene mutation with hx of PE and DVT on coumadin presents to the hospital complaining of cough, hemoptysis. Found to have viral pneumonia with respiratory failure and intubated. Extubated after prolonged course but required re-intubation 2/26 for worsening respiratory status and received IVC filter 2/26 because pt not safe for AC (alveolar hemorrhage).     #ARDS, alveolar hemorrhage, viral pneumonia, VAC pneumonia due to MRSA  - completed Zyvox, Levaquin, Cefepime course, and oseltamivir   - C/w Duoneb   - Solumedrol IV 60mg q12  - Bronch results - neg culture, neg fungal, cytology positive for inflammatory cells, no organisms  - Bronch 2/16 showed bleeding from DAH  - Bronch 2/26 for BAL, cultures show gram - rods, start meropenem 2/27--    #Elevated lactate/tropnemia  - lactate , 1.7-->3.1-->2.6-->1.5 (2/27)-->2.6-->3.1 (2/28)- Ordered by staff  - on meropenem for positive bronchial culture  - tropnemia due to demand ischemia?  - f/u trop/lactate    #Candidemia, secondary to central line  - cultures neg since 2/14  - oral fluconazole 14 days, switched to caspofungin for rising LFT (2/26), completed 2/27  - ID following    #Rectal Bleeding   - GI following - See follow-up today-status post flexible sig  - received 1U of blood 2/23, hemoglobin stable  - Protonix 40mg PO qd   - FlexSig showed 2 rectal ulcers one clean one crater, s/p clipping  - CBC q24- At least; would increase for any bleeding  - Avoid Dignshield or rectal tube   - s/p sigmoidoscopy recurrent rectal bleeding possibly from diverticulosis 2/28, received 2U pRBC,   - will need CT angio if bleeding again, f/u CBC    #Hypokalemia  -Given 40 mEq twice today, f/u repeat    #REJI oliguric likely ATN with Vanc toxicity   - Resolved  - Monitor BMP    # 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  - duplex shows DVT consistent with prior  - not candidate for AC at this time due to alveolar hemorrhage   - IVC filter placed 2/26    #Immunosuppressed: Autoimmune Hepatitis   - solumedrol 60 mg qd  - holding home prednisone 60 mg PO qd   - CMV PCR neg this admission   - f/u repeat tarcolimus level    #HTN  - Continue amlodipine 10 qd, Lopressor 50 BID    #Heterozygous prothrombin gene mutation with hx of PE and DVT on coumadin  - holding coumadin for now due to alveolar hemorrhage   - monitor coags    #0.5 cm of GB polyp  - needs f/u US in 12 months     #Hx of asthma  - C/w montelukast qd    #Deconditioning   -pt severely deconditioned due to prolonged hospital stay  - PT/Rehab     # GI PPx: Protonix 40 mg PO qd  # DVT PPx: IVC filter  # Code Status: FULL, palliative consult; Condition is very poor; /HCP has expressed possible wishes to go to less aggressive therapy.  I called him but received no return call today.  Continue to try to review with him.

## 2020-02-29 NOTE — PROCEDURE NOTE - ADDITIONAL PROCEDURE DETAILS
No active bleeding seen from rectal ulcer. Clips in place  scope is introduced up to 25 cm from anal verge, showing old blood , with no active bleeding , and extensive diverticulosis. Likely resolved diverticular bleed  REC:   if rebleeding , ct angio abdomen and pelvis   keep HGB above 8  serial CBC  correct thrombocytopenia
pre vs  o2 sat 100 on 100NRBM RR 30 /80 pre o2 100 x 3 mins propofol 100 mg IV succinycholine 100mg IV given CMAC 3 view grade 1 noted easy quick suction green secretions noted.  /80 o2 sat 100 on vent

## 2020-02-29 NOTE — PROGRESS NOTE ADULT - ASSESSMENT
73y/o F w/ hx of asthma, COPD not on home O2, autoimmune hepatitis on prednisone and tacrolimus, heterozygous prothrombin gene mutation with hx of PE and DVT on coumadin admitted with SOB, patient has been treated for Flu + AH1, ?superimposed atypical PNA, Acute hypoxic respiratory failure  , Candidemia on fluconazole, REJI improving. GI are re consulted for small amount of fresh blood per rectum and blood clots     # Fresh blood per rectum :  most likely secondary to diverticular bleeding  Pt  s/p flex sig on 2/21 that showed rectal ulcer with visible vessel sp 4 clips applied   S/P Flex sigmoidoscopy 2/28/2020 : Old blood until 25cm, no active bleeding, previous clips in place.  Hg stable  No active bleeding    REC:   Trend HG q12H and Keep above q8hrs  Avoid Dignshield or rectal tube   if patient has hematochezia consider CTA  If Hg is stable and no hematochezia then can start tube feeds in am.    # Autoimmune hepatitis:  - was on prednisone and tacrolimus as outpatient  -  with normal AST/ALT  - INR 4 days ago 1.27  - currently on solumedrol 60 q 12 hours, tacrolimus trough was high pending repeat, maintain therapeutic level   - follow up with Dr. Washington as outpatient    # 0.5 cm gallbladder polyp on US:   - repeat US abdomen in 6 months     Poor prognosis  Palliative on board 73y/o F w/ hx of asthma, COPD not on home O2, autoimmune hepatitis on prednisone and tacrolimus, heterozygous prothrombin gene mutation with hx of PE and DVT on coumadin admitted with SOB, patient has been treated for Flu + AH1, ?superimposed atypical PNA, Acute hypoxic respiratory failure  , Candidemia on fluconazole, REJI improving. GI are re consulted for small amount of fresh blood per rectum and blood clots     # Fresh blood per rectum :  most likely secondary to diverticular bleeding  Pt  s/p flex sig on 2/21 that showed rectal ulcer with visible vessel sp 4 clips applied   S/P Flex sigmoidoscopy 2/28/2020 : Old blood until 25cm, no active bleeding, previous clips in place.  Hg stable  No active bleeding    REC:   Trend HG q12H and Keep above q8hrs  Avoid Dignshield or rectal tube   if patient has hematochezia consider CTA  If Hg is stable and no hematochezia then can start tube feeds in am.  Evaluate for thrombocytopenia and R/O DIC    # Autoimmune hepatitis:  - was on prednisone and tacrolimus as outpatient  -  with normal AST/ALT  - INR 4 days ago 1.27  - currently on solumedrol 60 q 12 hours, tacrolimus trough was high pending repeat, maintain therapeutic level   - follow up with Dr. Washington as outpatient    # 0.5 cm gallbladder polyp on US:   - repeat US abdomen in 6 months     Poor prognosis  Palliative on board

## 2020-02-29 NOTE — PROGRESS NOTE ADULT - SUBJECTIVE AND OBJECTIVE BOX
DONI PATRICK  75y, Female    All available historical data reviewed    OVERNIGHT EVENTS:  none    ROS:  not obtainable    VITALS:  T(F): 97.5, Max: 99.4 (02-28-20 @ 12:00)  HR: 64  BP: 105/58  RR: 15Vital Signs Last 24 Hrs  T(C): 36.4 (29 Feb 2020 04:00), Max: 37.4 (28 Feb 2020 12:00)  T(F): 97.5 (29 Feb 2020 04:00), Max: 99.4 (28 Feb 2020 12:00)  HR: 64 (29 Feb 2020 04:00) (56 - 92)  BP: 105/58 (29 Feb 2020 04:00) (85/49 - 153/70)  BP(mean): 76 (29 Feb 2020 04:00) (64 - 107)  RR: 15 (29 Feb 2020 04:00) (14 - 40)  SpO2: 98% (29 Feb 2020 04:00) (98% - 100%)    TESTS & MEASUREMENTS:                        8.3    4.01  )-----------( 50       ( 29 Feb 2020 04:40 )             26.1     02-28    145  |  107  |  40<H>  ----------------------------<  304<H>  4.4   |  26  |  0.7    Ca    7.2<L>      28 Feb 2020 05:00    TPro  4.3<L>  /  Alb  1.9<L>  /  TBili  0.7  /  DBili  x   /  AST  40  /  ALT  61<H>  /  AlkPhos  177<H>  02-28    LIVER FUNCTIONS - ( 28 Feb 2020 05:00 )  Alb: 1.9 g/dL / Pro: 4.3 g/dL / ALK PHOS: 177 U/L / ALT: 61 U/L / AST: 40 U/L / GGT: x             Culture - Acid Fast - Bronchial w/Smear (collected 02-26-20 @ 14:45)  Source: .Bronchial None    Culture - Fungal, Bronchial (collected 02-26-20 @ 14:45)  Source: .Bronchial None  Preliminary Report (02-27-20 @ 08:21):    Testing in progress    Culture - Bronchial (collected 02-26-20 @ 14:45)  Source: .Bronchial None  Gram Stain (02-27-20 @ 07:36):    Moderate polymorphonuclear leukocytes seen per low power field    Rare Squamous epithelial cells seen per low power field    Numerous Gram Negative Rods seen per oil power field  Final Report (02-28-20 @ 21:13):    Numerous Klebsiella pneumoniae    Normal Respiratory Nikki present  Organism: Klebsiella pneumoniae (02-28-20 @ 21:13)  Organism: Klebsiella pneumoniae (02-28-20 @ 21:13)      -  Amikacin: S <=16      -  Amoxicillin/Clavulanic Acid: S <=8/4      -  Ampicillin: R >16 These ampicillin results predict results for amoxicillin      -  Ampicillin/Sulbactam: S 8/4 Enterobacter, Citrobacter, and Serratia may develop resistance during prolonged therapy (3-4 days)      -  Aztreonam: S <=4      -  Cefazolin: S <=2 Enterobacter, Citrobacter, and Serratia may develop resistance during prolonged therapy (3-4 days)      -  Cefepime: S <=2      -  Cefoxitin: S <=8      -  Ceftriaxone: S <=1 Enterobacter, Citrobacter, and Serratia may develop resistance during prolonged therapy      -  Ciprofloxacin: S <=1      -  Ertapenem: S <=0.5      -  Gentamicin: S <=2      -  Imipenem: S <=1      -  Levofloxacin: S <=2      -  Meropenem: S <=1      -  Piperacillin/Tazobactam: S <=8      -  Tobramycin: S <=2      -  Trimethoprim/Sulfamethoxazole: S <=2/38      Method Type: EROS            RADIOLOGY & ADDITIONAL TESTS:  Personal review of radiological diagnostics performed  Echo and EKG results noted when applicable.     MEDICATIONS:  acetaminophen   Tablet .. 650 milliGRAM(s) Oral every 6 hours PRN  albuterol/ipratropium for Nebulization 3 milliLiter(s) Nebulizer every 6 hours  calcitriol  Solution 0.25 MICROGram(s) Oral daily  calcium carbonate    500 mG (Tums) Chewable 3 Tablet(s) Chew every 8 hours  chlorhexidine 0.12% Liquid 15 milliLiter(s) Oral Mucosa two times a day  chlorhexidine 4% Liquid 1 Application(s) Topical <User Schedule>  cyanocobalamin 1000 MICROGram(s) Oral daily  dextrose 5%. 1000 milliLiter(s) IV Continuous <Continuous>  fentaNYL   Infusion. 0.5 MICROgram(s)/kG/Hr IV Continuous <Continuous>  gabapentin 300 milliGRAM(s) Oral at bedtime  glucagon  Injectable 1 milliGRAM(s) IntraMuscular once PRN  influenza   Vaccine 0.5 milliLiter(s) IntraMuscular once  insulin lispro (HumaLOG) corrective regimen sliding scale   SubCutaneous three times a day before meals  meropenem  IVPB      meropenem  IVPB 1000 milliGRAM(s) IV Intermittent every 8 hours  methylPREDNISolone sodium succinate Injectable 60 milliGRAM(s) IV Push every 12 hours  midazolam Infusion 0.02 mG/kG/Hr IV Continuous <Continuous>  multivitamin/minerals 1 Tablet(s) Oral daily  mupirocin 2% Ointment 1 Application(s) Topical two times a day  norepinephrine Infusion 0.05 MICROgram(s)/kG/Min IV Continuous <Continuous>  pantoprazole   Suspension 40 milliGRAM(s) Oral before breakfast      ANTIBIOTICS:  meropenem  IVPB      meropenem  IVPB 1000 milliGRAM(s) IV Intermittent every 8 hours

## 2020-02-29 NOTE — PROGRESS NOTE ADULT - ASSESSMENT
· Assessment		  ASSESSMENT  73y/o F w/ hx of asthma, COPD not on home O2, autoimmune hepatitis on prednisone and tacrolimus, heterozygous prothrombin gene mutation with hx of PE and DVT on coumadin with recent hospitalization in January 2020 with a diagnosis of pneumonia presents for worsening SOB, hemoptysis and cough.    IMPRESSION  #GNR PNA- BAL 2/26 with   Numerous Klebsiella pneumoniae    Acute hypoxemia, intubated 2/26, possible PE given +DVTs s/p Option Elite IVC filter via Right IJ access.  #Thrombocytopenia    continues post stopping linezolid   #Hx recent MRSA VAP    2/14 tracheal cx   Moderate Methicillin resistant Staphylococcus aureus    completed 8 day course, noted to have thrombocytopenia on linezolid  #Candidemia Fungitell: >500 (02-11-20 @ 11:21) likely CLABSI    2/11 BCX +, 2/12 BCX +, 2/13 BCX (-) RIJ 2/11. Groin a-line 2/4- removed 2/13     Fungitell: <31 (02-04-20 @ 04:40), Fungitell: 49 (02-01-20 @ 11:17)    Ophtho- no endophthalmitis   #Flu + AH1, Alveolar hemorrhage, ?superimposed atypical PNA (imaging not really consistent with bacterial PNA). Recent bronch 1/20 negative for PCP, Fungal, etc, previous bronch cx with yeast (likely oral contaminant) since GMS neg    2/3 Bronch   No organisms seen, 2/3 cytopath Rare atypical cells, few ciliated bronchial cells, alveolar macrophages and inflammatory cells, mainly neutrophils.    Strep urine Ag neg, serum crypto Ag neg, CMV PCR undetectable, AFB neg    Quantiferon indeterminate    Lactate Dehydrogenase, Serum: 607 (02.03.20 @ 04:30)    Procalcitonin, Serum: 1.86:    CTA chest showing no PE but showing interval development of multifocal bilateral groundglass opacities as well as new moderate to severe bronchiectasis in the right lung,   1/13 CT b/l GGOs, compatible with intersitial edema      Serum Pro-Brain Natriuretic Peptide: 722 pg/mL (01.31.20 @ 09:25)<-- Serum Pro-Brain Natriuretic Peptide: 345 pg/mL (01.13.20 @ 12:10)    During last hospitalization: bronch cx bacterial NG, +yeast, no ID, neg legionella, + MRSA PCR    MRSA PCR Result.: Positive (02-01-20 @ 20:40)  #Severe sepsis on admission P>90, WBC 19, RR>20, lactic acidosis Lactate, Blood: 2.9 mmol/L (01-31-20 @ 09:25)    afebrile   #Elevated Alk ph, transaminitis  #LLE wound, not infection     12/7 WCX MSSA, Kleb, bacteroides    8/2019 MRSA in a wound   #Immunosuppressed: autoimmune hepatitis on prednisone and tacrolimus    RECOMMENDATIONS  - caspo completed 14 days 2/27  - Meropenem 1g q8h IV , if hemodynamic compromise, dose amikacin 5mg/kg x1 to cover CRE  - grave prognosis

## 2020-02-29 NOTE — PROCEDURE NOTE - PROCEDURE DATE TIME, MLM
27-Feb-2020
04-Feb-2020 11:12
26-Feb-2020 10:41
28-Feb-2020 14:03
01-Feb-2020 13:45
11-Feb-2020 13:58
26-Feb-2020 10:36
26-Feb-2020 14:00
03-Feb-2020 13:42
16-Feb-2020 11:22

## 2020-02-29 NOTE — PROGRESS NOTE ADULT - SUBJECTIVE AND OBJECTIVE BOX
Patient is a 75y old  Female who presents with a chief complaint of SOB and desaturation (29 Feb 2020 05:26)      Subjective: Pt seen and examined , remains intubated and sedated . Drop in H/H noted this morning , was given 1 unit PRBC .       Vital Signs Last 24 Hrs  T(C): 36.4 (29 Feb 2020 04:00), Max: 37.4 (28 Feb 2020 12:00)  T(F): 97.5 (29 Feb 2020 04:00), Max: 99.4 (28 Feb 2020 12:00)  HR: 50 (29 Feb 2020 08:18) (50 - 92)  BP: 119/61 (29 Feb 2020 07:00) (87/55 - 153/70)  BP(mean): 81 (29 Feb 2020 07:00) (66 - 107)  RR: 16 (29 Feb 2020 07:00) (15 - 40)  SpO2: 98% (29 Feb 2020 08:18) (98% - 100%)    PHYSICAL EXAM  General: intubated and sedated   HEENT: oropharyngeally intubated  CV: +S1, +S2  Lungs: mechanical breath sounds   Abdomen: soft  Ext: L femoral line in place.  Site appears clean and dry.  Skin: Multiple ecchymoses on extremities.  Dressings on B/L LE wounds.  Neuro: unable to perform exam as pt is sedated       MEDICATIONS  (STANDING):  albuterol/ipratropium for Nebulization 3 milliLiter(s) Nebulizer every 6 hours  calcitriol  Solution 0.25 MICROGram(s) Oral daily  calcium carbonate    500 mG (Tums) Chewable 3 Tablet(s) Chew every 8 hours  chlorhexidine 0.12% Liquid 15 milliLiter(s) Oral Mucosa two times a day  chlorhexidine 4% Liquid 1 Application(s) Topical <User Schedule>  cyanocobalamin 1000 MICROGram(s) Oral daily  dextrose 5%. 1000 milliLiter(s) (50 mL/Hr) IV Continuous <Continuous>  fentaNYL   Infusion. 0.5 MICROgram(s)/kG/Hr (3.75 mL/Hr) IV Continuous <Continuous>  gabapentin 300 milliGRAM(s) Oral at bedtime  influenza   Vaccine 0.5 milliLiter(s) IntraMuscular once  insulin lispro (HumaLOG) corrective regimen sliding scale   SubCutaneous three times a day before meals  meropenem  IVPB      meropenem  IVPB 1000 milliGRAM(s) IV Intermittent every 8 hours  methylPREDNISolone sodium succinate Injectable 60 milliGRAM(s) IV Push every 12 hours  midazolam Infusion 0.02 mG/kG/Hr (1.444 mL/Hr) IV Continuous <Continuous>  multivitamin/minerals 1 Tablet(s) Oral daily  mupirocin 2% Ointment 1 Application(s) Topical two times a day  norepinephrine Infusion 0.05 MICROgram(s)/kG/Min (6.769 mL/Hr) IV Continuous <Continuous>  pantoprazole   Suspension 40 milliGRAM(s) Oral before breakfast    MEDICATIONS  (PRN):  acetaminophen   Tablet .. 650 milliGRAM(s) Oral every 6 hours PRN Temp greater or equal to 38C (100.4F)  glucagon  Injectable 1 milliGRAM(s) IntraMuscular once PRN Glucose LESS THAN 70 milligrams/deciliter      LABS:                          8.3    4.01  )-----------( 50       ( 29 Feb 2020 04:40 )             26.1         Mean Cell Volume : 87.6 fL  Mean Cell Hemoglobin : 27.9 pg  Mean Cell Hemoglobin Concentration : 31.8 g/dL  Auto Neutrophil # : 3.29 K/uL  Auto Lymphocyte # : 0.53 K/uL  Auto Monocyte # : 0.10 K/uL  Auto Eosinophil # : 0.00 K/uL  Auto Basophil # : 0.02 K/uL  Auto Neutrophil % : 82.1 %  Auto Lymphocyte % : 13.2 %  Auto Monocyte % : 2.5 %  Auto Eosinophil % : 0.0 %  Auto Basophil % : 0.5 %      Serial CBC's  02-29 @ 04:40  Hct-26.1 / Hgb-8.3 / Plat-50 / RBC-2.98 / WBC-4.01  Serial CBC's  02-28 @ 20:20  Hct-28.5 / Hgb-9.3 / Plat-57 / RBC-3.21 / WBC-5.18  Serial CBC's  02-28 @ 12:00  Hct-20.2 / Hgb-6.3 / Plat-58 / RBC-2.19 / WBC-4.95  Serial CBC's  02-28 @ 05:00  Hct-22.1 / Hgb-7.1 / Plat-63 / RBC-2.39 / WBC-6.50  Serial CBC's  02-27 @ 18:34  Hct-24.9 / Hgb-7.8 / Plat-64 / RBC-2.73 / WBC-4.40  Serial CBC's  02-27 @ 04:30  Hct-24.1 / Hgb-7.7 / Plat-54 / RBC-2.67 / WBC-2.79  Serial CBC's  02-26 @ 08:39  Hct-25.9 / Hgb-8.7 / Plat-55 / RBC-2.92 / WBC-2.09      02-29    141  |  103  |  45<H>  ----------------------------<  226<H>  4.6   |  25  |  0.7    Ca    7.6<L>      29 Feb 2020 04:40    TPro  4.4<L>  /  Alb  1.9<L>  /  TBili  0.8  /  DBili  x   /  AST  27  /  ALT  48<H>  /  AlkPhos  156<H>  02-29      PT/INR - ( 29 Feb 2020 04:40 )   PT: 13.50 sec;   INR: 1.17 ratio         PTT - ( 29 Feb 2020 04:40 )  PTT:24.5 sec    Reticulocyte Percent: 1.4 % (02-28 @ 05:00)                    Culture - Acid Fast - Bronchial w/Smear (collected 26 Feb 2020 14:45)  Source: .Bronchial None    Culture - Fungal, Bronchial (collected 26 Feb 2020 14:45)  Source: .Bronchial None  Preliminary Report (27 Feb 2020 08:21):    Testing in progress    Culture - Bronchial (collected 26 Feb 2020 14:45)  Source: .Bronchial None  Gram Stain (27 Feb 2020 07:36):    Moderate polymorphonuclear leukocytes seen per low power field    Rare Squamous epithelial cells seen per low power field    Numerous Gram Negative Rods seen per oil power field  Final Report (28 Feb 2020 21:13):    Numerous Klebsiella pneumoniae    Normal Respiratory Nikki present  Organism: Klebsiella pneumoniae (28 Feb 2020 21:13)  Organism: Klebsiella pneumoniae (28 Feb 2020 21:13)            BLOOD SMEAR INTERPRETATION:       RADIOLOGY & ADDITIONAL STUDIES: cries

## 2020-02-29 NOTE — PROCEDURE NOTE - NSPERFORMEDBY_GEN_A_CORE
Fellow/Myself
Myself
Myself/Attending
Resident/Myself
Fellow
Myself
Attending
Attending

## 2020-02-29 NOTE — PROGRESS NOTE ADULT - ASSESSMENT
74y female with PMH of asthma, COPD not on home O2, autoimmune hepatitis on prednisone and tacrolimus, heterozygous prothrombin gene mutation with hx of PE and DVT on coumadin presents to the hospital complaining of cough, hemoptysis. Found to have viral pneumonia with respiratory failure and intubated. Extubated after prolonged course but required re-intubation 2/26 for worsening respiratory status and received IVC filter 2/26 because pt not safe for AC (alveolar hemorrhage).     #ARDS, alveolar hemorrhage, viral pneumonia, VAC pneumonia due to MRSA  - completed Zyvox, Levaquin, Cefepime course, and oseltamivir   - C/w Duoneb   - Solumedrol IV 60mg q12  - Bronch results - neg culture, neg fungal, cytology positive for inflammatory cells, no organisms  - Bronch 2/16 showed bleeding from DAH  - Bronch 2/26 for BAL, cultures show gram - rods, start meropenem 2/27    #Elevated lactate/tropnemia  - lactate , 1.7-->3.1-->2.6-->1.5 (2/27)-->2.6-->3.1 (2/28)  - on meropenem for positive bronchial culture  - tropnemia due to demand ischemia?  - f/u trop/lactate    #Candidemia, secondary to central line  - cultures neg since 2/14  - oral fluconazole 14 days, switched to caspofungin for rising LFT (2/26), completed 2/27  - ID following    #Rectal Bleeding   - GI following   - received 1U of blood 2/23, hemoglobin stable  - Protonix 40mg PO qd   - FlexSig showed 2 rectal ulcers one clean one crater, s/p clipping  - CBC q24  - Avoid Dignshield or rectal tube   - s/p sigmoidoscopy recurrent rectal bleeding possibly from diverticulosis 2/28, received 2U pRBC,   - will need CT angio if bleeding again, f/u CBC    #Hypokalemia  -Given 40 mEq twice today, f/u repeat    #REJI oliguric likely ATN with Vanc toxicity   - Resolved  - Monitor BMP    # 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  - duplex shows DVT consistent with prior  - not candidate for AC at this time due to alveolar hemorrhage   - IVC filter placed 2/26    #Immunosuppressed: Autoimmune Hepatitis   - solumedrol 60 mg qd  - holding home prednisone 60 mg PO qd   - CMV PCR neg this admission   - f/u repeat tarcolimus level    #HTN  - Continue amlodipine 10 qd, Lopressor 50 BID    #Heterozygous prothrombin gene mutation with hx of PE and DVT on coumadin  - holding coumadin for now due to alveolar hemorrhage   - monitor coags    #0.5 cm of GB polyp  - needs f/u US in 12 months     #Hx of asthma  - C/w montelukast qd    #Deconditioning   -pt severely deconditioned due to prolonged hospital stay  - PT/Rehab     # GI PPx: Protonix 40 mg PO qd  # DVT PPx: IVC filter  # Code Status: FULL, palliative consult

## 2020-03-01 NOTE — PROGRESS NOTE ADULT - SUBJECTIVE AND OBJECTIVE BOX
Chart reviewed, patient examined. Pertinent results reviewed.  Case discussed with BONNIE; specialist f/u reviewed- Multiple updated evaluation  HD#30; status post reintubation on     CHIEF COMPLAINT:  Patient is a 75y old  Female who presented with a chief complaint of SOB and desaturation (2020 09:19), Who has had a long and very complicated hospitalization including intubation x2.  She now remains in the ICU intubated,  sedated with IV fentanyl, on FiO2 40%, but off pressors.    INTERVAL HISTORYOVERNIGHT EVENTS: Her GI bleeding has eased.          MEDICATIONS:  MEDICATIONS  (STANDING):  ALBUTerol    90 MICROgram(s) HFA Inhaler 4 Puff(s) Inhalation every 6 hours  calcitriol  Solution 0.25 MICROGram(s) Oral daily  calcium carbonate    500 mG (Tums) Chewable 3 Tablet(s) Chew every 8 hours  chlorhexidine 0.12% Liquid 15 milliLiter(s) Oral Mucosa two times a day  chlorhexidine 4% Liquid 1 Application(s) Topical <User Schedule>  cyanocobalamin 1000 MICROGram(s) Oral daily  dextrose 5%. 1000 milliLiter(s) (50 mL/Hr) IV Continuous <Continuous>  fentaNYL   Infusion. 0.519 MICROgram(s)/kG/Hr (3.75 mL/Hr) IV Continuous <Continuous>  gabapentin 300 milliGRAM(s) Oral at bedtime  influenza   Vaccine 0.5 milliLiter(s) IntraMuscular once  insulin lispro (HumaLOG) corrective regimen sliding scale   SubCutaneous three times a day before meals  ipratropium 17 MICROgram(s) HFA Inhaler 4 Puff(s) Inhalation every 6 hours  meropenem  IVPB      meropenem  IVPB 1000 milliGRAM(s) IV Intermittent every 8 hours  methylPREDNISolone sodium succinate Injectable 60 milliGRAM(s) IV Push every 12 hours  midazolam Infusion 0.02 mG/kG/Hr (1.444 mL/Hr) IV Continuous <Continuous>  multivitamin/minerals 1 Tablet(s) Oral daily  mupirocin 2% Ointment 1 Application(s) Topical two times a day  norepinephrine Infusion 0.05 MICROgram(s)/kG/Min (6.769 mL/Hr) IV Continuous <Continuous>  pantoprazole   Suspension 40 milliGRAM(s) Oral before breakfast    MEDICATIONS  (PRN):  acetaminophen   Tablet .. 650 milliGRAM(s) Oral every 6 hours PRN Temp greater or equal to 38C (100.4F)  glucagon  Injectable 1 milliGRAM(s) IntraMuscular once PRN Glucose LESS THAN 70 milligrams/deciliter        ALLERGIES:  Allergies    No Known Allergies    Intolerances        OBJECTIVE:  ICU Vital Signs Last 24 Hrs  T(C): 35.9 (01 Mar 2020 08:00), Max: 36.4 (2020 18:00)  T(F): 96.7 (01 Mar 2020 08:00), Max: 97.6 (2020 18:00)  HR: 92 (01 Mar 2020 11:00) (58 - 130)  BP: 107/69 (01 Mar 2020 11:00) (82/50 - 177/98)  BP(mean): 81 (01 Mar 2020 11:00) (59 - 146)  ABP: --  ABP(mean): --  RR: 15 (01 Mar 2020 11:00) (15 - 50)  SpO2: 97% (01 Mar 2020 11:00) (91% - 100%)    Mode: AC/ CMV (Assist Control/ Continuous Mandatory Ventilation)  RR (machine): 16  TV (machine): 450  FiO2: 40- lowered From 60% yesterday  PEEP: 5  ITime: 1  MAP: 12  PIP: 31    CAPILLARY BLOOD GLUCOSE    Daily     Daily Weight in k.9 (2020 04:00)    PHYSICAL EXAMINATION:  General: intubated, sedated; ETT/OGT; Multiple ecchymoses and extremity wraps  HEENT: PERRLA  Neurology: sedated  Respiratory:  b/l Scattered rhonchi; DECreased BS  CV: , S1S2, no murmurs, rubs or gallops  Abdominal: Soft, ND  Extremities: peripheral edema, + peripheral pulses; many Ecchymoses with few avulsions  Incisions:   Tubes: intubated    LABS:  ABG - ( 01 Mar 2020 04:48 )  pH, Arterial: 7.46  pH, Blood: x     /  pCO2: 34    /  pO2: 57    / HCO3: 24    / Base Excess: 0.7   /  SaO2: 91        ABG - ( 2020 04:08 )  pH, Arterial: 7.37  pH, Blood: x     /  pCO2: 46    /  pO2: 145   / HCO3: 27    / Base Excess: 1.4   /  SaO2: 99                                      8.3    4.01  )-----------( 50       ( 2020 04:40 )             26.1         141  |  103  |  45<H>  ----------------------------<  226<H>  4.6   |  25  |  0.7    Ca    7.6<L>      2020 04:40    TPro  4.4<L>  /  Alb  1.9<L>  /  TBili  0.8  /  DBili  x   /  AST  27  /  ALT  48<H>  /  AlkPhos  156<H>      LIVER FUNCTIONS - ( 2020 04:40 )  Alb: 1.9 g/dL / Pro: 4.4 g/dL / ALK PHOS: 156 U/L / ALT: 48 U/L / AST: 27 U/L / GGT: x           PT/INR - ( 2020 04:40 )   PT: 13.50 sec;   INR: 1.17 ratio         PTT - ( 2020 04:40 )  PTT:24.5 sec    CARDIAC MARKERS ( 2020 05:00 )  x     / 0.11 ng/mL / x     / x     / x      CARDIAC MARKERS ( 2020 14:18 )  x     / 0.13 ng/mL / x     / x     / x              TELEMETRY:     EKG:     IMAGING:

## 2020-03-01 NOTE — PROGRESS NOTE ADULT - ASSESSMENT
74y female with PMH of asthma, COPD not on home O2, autoimmune hepatitis on prednisone and tacrolimus, heterozygous prothrombin gene mutation with hx of PE and DVT on coumadin presents to the hospital complaining of cough, hemoptysis. Found to have viral pneumonia with respiratory failure and intubated. Extubated after prolonged course but required re-intubation 2/26 for worsening respiratory status and received IVC filter 2/26 because pt not safe for AC (alveolar hemorrhage).     Palliative meeting on Monday poor prognosis    #) Diffuse Alveoloar hemorrhage  - completed Zyvox, Levaquin, Cefepime course, and oseltamivir   - C/w Duoneb   - Solumedrol IV 60mg q12  - Bronch results - neg culture, neg fungal, cytology positive for inflammatory cells, no organisms  - Bronch 2/16 showed bleeding from DAH  - Bronch 2/26 for BAL, cultures show gram - rods, start meropenem 2/27    #) Elevated lactate/tropnemia  - on meropenem for positive bronchial culture  - tropnemia due to demand ischemia?  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  - duplex shows DVT consistent with prior  - not candidate for AC at this time due to alveolar hemorrhage   - IVC filter placed 2/26    #Immunosuppressed: Autoimmune Hepatitis   - solumedrol 60 mg qd  - holding home prednisone 60 mg PO qd   - CMV PCR neg this admission   - f/u repeat tarcolimus level    #HTN  - Continue amlodipine 10 qd, Lopressor 50 BID    #Heterozygous prothrombin gene mutation with hx of PE and DVT on coumadin  - holding coumadin for now due to alveolar hemorrhage   - monitor coags    #0.5 cm of GB polyp  - repeat US abdomen in 6 months per GI    #Hx of asthma  - C/w montelukast qd    #Deconditioning   - pt severely deconditioned due to prolonged hospital stay  - PT/Rehab     #) MISC  Activity: intubated  DVT ppx: IVC filter  GI ppx: protonix  Diet: tube feeding  Code: Full  Dispo: medical optimization  CHG wash

## 2020-03-01 NOTE — PROGRESS NOTE ADULT - ASSESSMENT
74y female with PMH of asthma, COPD not on home O2, autoimmune hepatitis on prednisone and tacrolimus, heterozygous prothrombin gene mutation with hx of PE and DVT on coumadin presents to the hospital complaining of cough, hemoptysis. Found to have viral pneumonia with respiratory failure and intubated. Extubated after prolonged course but required re-intubation 2/26 for worsening respiratory status and received IVC filter 2/26 because pt not safe for AC (alveolar hemorrhage).     #ARDS, alveolar hemorrhage, viral pneumonia, VAC pneumonia due to MRSA  - completed Zyvox, Levaquin, Cefepime course, and oseltamivir   - C/w Duoneb   - Solumedrol IV 60mg q12  - Bronch results - neg culture, neg fungal, cytology positive for inflammatory cells, no organisms  - Bronch 2/16 showed bleeding from DAH  - Bronch 2/26 for BAL, cultures show gram - rods, start meropenem 2/27--  - Condition is very serious and refractory; prognosis is poor as stated by multiple specialists    #Elevated lactate/tropnemia  - lactate , 1.7-->3.1-->2.6-->1.5 (2/27)-->2.6-->3.1 (2/28)- Ordered by staff  - on meropenem for positive bronchial culture  - tropnemia due to demand ischemia?  - f/u trop/lactate    #Candidemia, secondary to central line  - cultures neg since 2/14  - oral fluconazole 14 days, switched to caspofungin for rising LFT (2/26), completed 2/27  - ID following    #Rectal Bleeding   - GI following - none actively-status post flexible sig  - received 1U of blood 2/23, hemoglobin stable  - Protonix 40mg PO qd   - FlexSig showed 2 rectal ulcers one clean one crater, s/p clipping  - CBC q24- At least; would increase for any bleeding  - Avoid Dignshield or rectal tube   - s/p sigmoidoscopy recurrent rectal bleeding possibly from diverticulosis 2/28, received 2U pRBC,   - will need CT angio if bleeding again, f/u CBC    #Hypokalemia  -Given 40 mEq twice today, f/u repeat    #REJI oliguric likely ATN with Vanc toxicity   - Resolved  - Monitor BMP    # 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  - duplex shows DVT consistent with prior  - not candidate for AC at this time due to alveolar hemorrhage   - IVC filter placed 2/26    #Immunosuppressed: Autoimmune Hepatitis   - solumedrol 60 mg qd  - holding home prednisone 60 mg PO qd   - CMV PCR neg this admission   - f/u repeat tarcolimus level    #HTN  - Continue amlodipine 10 qd, Lopressor 50 BID    #Heterozygous prothrombin gene mutation with hx of PE and DVT on coumadin  - holding coumadin for now due to alveolar hemorrhage   - monitor coags    #0.5 cm of GB polyp  - needs f/u US in 12 months     #Hx of asthma  - C/w montelukast qd    #Deconditioning   -pt severely deconditioned due to prolonged hospital stay  - PT/Rehab     # GI PPx: Protonix 40 mg PO qd  # DVT PPx: IVC filter  # Code Status: FULL, palliative consult; Condition is very poor; /HCP has expressed possible wishes to go to less aggressive therapy.  .  Continue to try to review with him.

## 2020-03-01 NOTE — PROGRESS NOTE ADULT - SUBJECTIVE AND OBJECTIVE BOX
DONI PATRICK 75y Female  MRN#: [id number]   CODE STATUS: full      SUBJECTIVE  Patient is a 75y old Female who presents with a chief complaint of SOB and desaturation (01 Mar 2020 13:10)  Currently admitted to medicine with the primary diagnosis of Pneumonia      OBJECTIVE  PAST MEDICAL & SURGICAL HISTORY  Prothrombin gene mutation  Autoimmune hepatitis  Other chronic pulmonary embolism without acute cor pulmonale  Essential hypertension  Autoimmune hepatitis treated with steroids  Asthma  DVT (deep venous thrombosis)  S/P debridement  History of back surgery    ALLERGIES:  No Known Allergies    MEDICATIONS:  STANDING MEDICATIONS  ALBUTerol    90 MICROgram(s) HFA Inhaler 4 Puff(s) Inhalation every 6 hours  calcitriol  Solution 0.25 MICROGram(s) Oral daily  calcium carbonate    500 mG (Tums) Chewable 3 Tablet(s) Chew every 8 hours  chlorhexidine 0.12% Liquid 15 milliLiter(s) Oral Mucosa two times a day  chlorhexidine 4% Liquid 1 Application(s) Topical <User Schedule>  cyanocobalamin 1000 MICROGram(s) Oral daily  dextrose 5%. 1000 milliLiter(s) IV Continuous <Continuous>  fentaNYL   Infusion. 0.519 MICROgram(s)/kG/Hr IV Continuous <Continuous>  gabapentin 300 milliGRAM(s) Oral at bedtime  influenza   Vaccine 0.5 milliLiter(s) IntraMuscular once  insulin lispro (HumaLOG) corrective regimen sliding scale   SubCutaneous three times a day before meals  ipratropium 17 MICROgram(s) HFA Inhaler 4 Puff(s) Inhalation every 6 hours  meropenem  IVPB      meropenem  IVPB 1000 milliGRAM(s) IV Intermittent every 8 hours  methylPREDNISolone sodium succinate Injectable 60 milliGRAM(s) IV Push every 12 hours  midazolam Infusion 0.02 mG/kG/Hr IV Continuous <Continuous>  multivitamin/minerals 1 Tablet(s) Oral daily  mupirocin 2% Ointment 1 Application(s) Topical two times a day  norepinephrine Infusion 0.05 MICROgram(s)/kG/Min IV Continuous <Continuous>  pantoprazole   Suspension 40 milliGRAM(s) Oral before breakfast    PRN MEDICATIONS  acetaminophen   Tablet .. 650 milliGRAM(s) Oral every 6 hours PRN  glucagon  Injectable 1 milliGRAM(s) IntraMuscular once PRN      VITAL SIGNS: Last 24 Hours  T(C): 35.9 (01 Mar 2020 08:00), Max: 36.4 (29 Feb 2020 18:00)  T(F): 96.7 (01 Mar 2020 08:00), Max: 97.6 (29 Feb 2020 18:00)  HR: 92 (01 Mar 2020 11:00) (62 - 130)  BP: 107/69 (01 Mar 2020 11:00) (82/50 - 177/98)  BP(mean): 81 (01 Mar 2020 11:00) (59 - 146)  RR: 15 (01 Mar 2020 11:00) (15 - 50)  SpO2: 97% (01 Mar 2020 11:00) (91% - 100%)    PHYSICAL EXAM:  GENERAL: Intubated  HEENT:  Atraumatic, Normocephalic.   PULMONARY: b/l crackles  CARDIOVASCULAR: s1/s2  GASTROINTESTINAL: Soft, bs+  MUSCULOSKELETAL:  2+ Peripheral Pulses, edema      LABS:                        9.4    3.53  )-----------( 47       ( 01 Mar 2020 04:50 )             27.7     03-01    137  |  99  |  45<H>  ----------------------------<  219<H>  4.8   |  24  |  0.7    Ca    7.4<L>      01 Mar 2020 04:50    TPro  5.1<L>  /  Alb  2.0<L>  /  TBili  0.9  /  DBili  x   /  AST  34  /  ALT  52<H>  /  AlkPhos  229<H>  03-01    PT/INR - ( 29 Feb 2020 04:40 )   PT: 13.50 sec;   INR: 1.17 ratio         PTT - ( 29 Feb 2020 04:40 )  PTT:24.5 sec    ABG - ( 01 Mar 2020 04:48 )  pH, Arterial: 7.46  pH, Blood: x     /  pCO2: 34    /  pO2: 57    / HCO3: 24    / Base Excess: 0.7   /  SaO2: 91                        RADIOLOGY:

## 2020-03-01 NOTE — PROGRESS NOTE ADULT - SUBJECTIVE AND OBJECTIVE BOX
DONI PATRICK  75y, Female    All available historical data reviewed    OVERNIGHT EVENTS:  none  on pressors, sedated, non res;ponsive  ROS:      VITALS:  T(F): 97.5, Max: 97.6 (02-29-20 @ 18:00)  HR: 114  BP: 143/71  RR: 25Vital Signs Last 24 Hrs  T(C): 36.4 (29 Feb 2020 20:00), Max: 36.4 (29 Feb 2020 18:00)  T(F): 97.5 (29 Feb 2020 20:00), Max: 97.6 (29 Feb 2020 18:00)  HR: 114 (01 Mar 2020 04:00) (50 - 120)  BP: 143/71 (01 Mar 2020 04:00) (83/56 - 176/86)  BP(mean): 89 (01 Mar 2020 04:00) (63 - 139)  RR: 25 (01 Mar 2020 04:00) (15 - 50)  SpO2: 95% (01 Mar 2020 04:00) (91% - 100%)    TESTS & MEASUREMENTS:                        9.4    3.53  )-----------( 47       ( 01 Mar 2020 04:50 )             27.7     03-01    137  |  99  |  45<H>  ----------------------------<  219<H>  4.8   |  24  |  0.7    Ca    7.4<L>      01 Mar 2020 04:50    TPro  5.1<L>  /  Alb  2.0<L>  /  TBili  0.9  /  DBili  x   /  AST  34  /  ALT  52<H>  /  AlkPhos  229<H>  03-01    LIVER FUNCTIONS - ( 01 Mar 2020 04:50 )  Alb: 2.0 g/dL / Pro: 5.1 g/dL / ALK PHOS: 229 U/L / ALT: 52 U/L / AST: 34 U/L / GGT: x             Culture - Acid Fast - Bronchial w/Smear (collected 02-26-20 @ 14:45)  Source: .Bronchial None  Preliminary Report (02-29-20 @ 15:04):    Culture is being performed.    Culture - Fungal, Bronchial (collected 02-26-20 @ 14:45)  Source: .Bronchial None  Preliminary Report (02-27-20 @ 08:21):    Testing in progress    Culture - Bronchial (collected 02-26-20 @ 14:45)  Source: .Bronchial None  Gram Stain (02-27-20 @ 07:36):    Moderate polymorphonuclear leukocytes seen per low power field    Rare Squamous epithelial cells seen per low power field    Numerous Gram Negative Rods seen per oil power field  Final Report (02-28-20 @ 21:13):    Numerous Klebsiella pneumoniae    Normal Respiratory Nikki present  Organism: Klebsiella pneumoniae (02-28-20 @ 21:13)  Organism: Klebsiella pneumoniae (02-28-20 @ 21:13)      -  Amikacin: S <=16      -  Amoxicillin/Clavulanic Acid: S <=8/4      -  Ampicillin: R >16 These ampicillin results predict results for amoxicillin      -  Ampicillin/Sulbactam: S 8/4 Enterobacter, Citrobacter, and Serratia may develop resistance during prolonged therapy (3-4 days)      -  Aztreonam: S <=4      -  Cefazolin: S <=2 Enterobacter, Citrobacter, and Serratia may develop resistance during prolonged therapy (3-4 days)      -  Cefepime: S <=2      -  Cefoxitin: S <=8      -  Ceftriaxone: S <=1 Enterobacter, Citrobacter, and Serratia may develop resistance during prolonged therapy      -  Ciprofloxacin: S <=1      -  Ertapenem: S <=0.5      -  Gentamicin: S <=2      -  Imipenem: S <=1      -  Levofloxacin: S <=2      -  Meropenem: S <=1      -  Piperacillin/Tazobactam: S <=8      -  Tobramycin: S <=2      -  Trimethoprim/Sulfamethoxazole: S <=2/38      Method Type: [facility]            RADIOLOGY & ADDITIONAL TESTS:  Personal review of radiological diagnostics performed  Echo and EKG results noted when applicable.     MEDICATIONS:  acetaminophen   Tablet .. 650 milliGRAM(s) Oral every 6 hours PRN  ALBUTerol    90 MICROgram(s) HFA Inhaler 4 Puff(s) Inhalation every 6 hours  calcitriol  Solution 0.25 MICROGram(s) Oral daily  calcium carbonate    500 mG (Tums) Chewable 3 Tablet(s) Chew every 8 hours  chlorhexidine 0.12% Liquid 15 milliLiter(s) Oral Mucosa two times a day  chlorhexidine 4% Liquid 1 Application(s) Topical <User Schedule>  cyanocobalamin 1000 MICROGram(s) Oral daily  dextrose 5%. 1000 milliLiter(s) IV Continuous <Continuous>  fentaNYL   Infusion. 0.5 MICROgram(s)/kG/Hr IV Continuous <Continuous>  gabapentin 300 milliGRAM(s) Oral at bedtime  glucagon  Injectable 1 milliGRAM(s) IntraMuscular once PRN  influenza   Vaccine 0.5 milliLiter(s) IntraMuscular once  insulin lispro (HumaLOG) corrective regimen sliding scale   SubCutaneous three times a day before meals  ipratropium 17 MICROgram(s) HFA Inhaler 4 Puff(s) Inhalation every 6 hours  meropenem  IVPB      meropenem  IVPB 1000 milliGRAM(s) IV Intermittent every 8 hours  methylPREDNISolone sodium succinate Injectable 60 milliGRAM(s) IV Push every 12 hours  midazolam Infusion 0.02 mG/kG/Hr IV Continuous <Continuous>  multivitamin/minerals 1 Tablet(s) Oral daily  mupirocin 2% Ointment 1 Application(s) Topical two times a day  norepinephrine Infusion 0.05 MICROgram(s)/kG/Min IV Continuous <Continuous>  pantoprazole   Suspension 40 milliGRAM(s) Oral before breakfast      ANTIBIOTICS:  meropenem  IVPB      meropenem  IVPB 1000 milliGRAM(s) IV Intermittent every 8 hours

## 2020-03-01 NOTE — PROGRESS NOTE ADULT - ASSESSMENT
ASSESSMENT  75y/o F w/ hx of asthma, COPD not on home O2, autoimmune hepatitis on prednisone and tacrolimus, heterozygous prothrombin gene mutation with hx of PE and DVT on coumadin with recent hospitalization in January 2020 with a diagnosis of pneumonia presents for worsening SOB, hemoptysis and cough.    IMPRESSION  #GNR PNA- BAL 2/26 with   Numerous Klebsiella pneumoniae    Acute hypoxemia, intubated 2/26, possible PE given +DVTs s/p Option Elite IVC filter via Right IJ access.  #Thrombocytopenia    continues post stopping linezolid   #Hx recent MRSA VAP    2/14 tracheal cx   Moderate Methicillin resistant Staphylococcus aureus    completed 8 day course, noted to have thrombocytopenia on linezolid  #Candidemia Fungitell: >500 (02-11-20 @ 11:21) likely CLABSI    2/11 BCX +, 2/12 BCX +, 2/13 BCX (-) RIJ 2/11. Groin a-line 2/4- removed 2/13     Fungitell: <31 (02-04-20 @ 04:40), Fungitell: 49 (02-01-20 @ 11:17)    Ophtho- no endophthalmitis   #Flu + AH1, Alveolar hemorrhage, ?superimposed atypical PNA (imaging not really consistent with bacterial PNA). Recent bronch 1/20 negative for PCP, Fungal, etc, previous bronch cx with yeast (likely oral contaminant) since GMS neg    2/3 Bronch   No organisms seen, 2/3 cytopath Rare atypical cells, few ciliated bronchial cells, alveolar macrophages and inflammatory cells, mainly neutrophils.    Strep urine Ag neg, serum crypto Ag neg, CMV PCR undetectable, AFB neg    Quantiferon indeterminate    Lactate Dehydrogenase, Serum: 607 (02.03.20 @ 04:30)    Procalcitonin, Serum: 1.86:    CTA chest showing no PE but showing interval development of multifocal bilateral groundglass opacities as well as new moderate to severe bronchiectasis in the right lung,   1/13 CT b/l GGOs, compatible with intersitial edema      Serum Pro-Brain Natriuretic Peptide: 722 pg/mL (01.31.20 @ 09:25)<-- Serum Pro-Brain Natriuretic Peptide: 345 pg/mL (01.13.20 @ 12:10)    During last hospitalization: bronch cx bacterial NG, +yeast, no ID, neg legionella, + MRSA PCR    MRSA PCR Result.: Positive (02-01-20 @ 20:40)  #Severe sepsis on admission P>90, WBC 19, RR>20, lactic acidosis Lactate, Blood: 2.9 mmol/L (01-31-20 @ 09:25)    afebrile   #Elevated Alk ph, transaminitis  #LLE wound, not infection     12/7 WCX MSSA, Kleb, bacteroides    8/2019 MRSA in a wound   #Immunosuppressed: autoimmune hepatitis on prednisone and tacrolimus    RECOMMENDATIONS  - caspo completed 14 days 2/27  - Meropenem 1g q8h IV , if hemodynamic compromise, dose amikacin 5mg/kg x1 to cover CRE  - grave prognosis

## 2020-03-01 NOTE — PROGRESS NOTE ADULT - GUM GEN PE MLT EXAM PC
Normal genitalia; no lesions; no discharge

## 2020-03-01 NOTE — PROGRESS NOTE ADULT - GIT ABD PE PAL DETAILS PC
bowel sounds hypoactive/distended
bowel sounds hypoactive
distended/bowel sounds hypoactive

## 2020-03-02 NOTE — PROGRESS NOTE ADULT - SUBJECTIVE AND OBJECTIVE BOX
Patient is a 75y old  Female who presents with a chief complaint of SOB and desaturation (02 Mar 2020 08:20)      Subjective: Patient seen and examined.  Chart reviewed.  Patient remains intubated.      Vital Signs Last 24 Hrs  T(C): 37.3 (02 Mar 2020 04:00), Max: 37.3 (02 Mar 2020 04:00)  T(F): 99.1 (02 Mar 2020 04:00), Max: 99.1 (02 Mar 2020 04:00)  HR: 88 (02 Mar 2020 07:23) (66 - 128)  BP: 173/102 (02 Mar 2020 07:00) (78/49 - 183/91)  BP(mean): 131 (02 Mar 2020 07:00) (58 - 146)  RR: 16 (02 Mar 2020 07:00) (14 - 51)  SpO2: 96% (02 Mar 2020 07:23) (89% - 100%)    PHYSICAL EXAM  General: critically ill apearing  HEENT: oropharyngeally intubated  Lungs: positive air movement b/l ant lungs  Abdomen: soft  : henley catheter draining clear yellow urine  Ext: chronic wounds covered in dressings  Skin: multiple ecchymoses  Neuro: sedated    MEDICATIONS  (STANDING):  ALBUTerol    90 MICROgram(s) HFA Inhaler 4 Puff(s) Inhalation every 6 hours  calcitriol  Solution 0.25 MICROGram(s) Oral daily  calcium carbonate    500 mG (Tums) Chewable 3 Tablet(s) Chew every 8 hours  chlorhexidine 0.12% Liquid 15 milliLiter(s) Oral Mucosa two times a day  chlorhexidine 4% Liquid 1 Application(s) Topical <User Schedule>  cyanocobalamin 1000 MICROGram(s) Oral daily  dextrose 5%. 1000 milliLiter(s) (50 mL/Hr) IV Continuous <Continuous>  fentaNYL   Infusion. 0.519 MICROgram(s)/kG/Hr (3.75 mL/Hr) IV Continuous <Continuous>  gabapentin 300 milliGRAM(s) Oral at bedtime  influenza   Vaccine 0.5 milliLiter(s) IntraMuscular once  insulin lispro (HumaLOG) corrective regimen sliding scale   SubCutaneous three times a day before meals  ipratropium 17 MICROgram(s) HFA Inhaler 4 Puff(s) Inhalation every 6 hours  meropenem  IVPB      meropenem  IVPB 1000 milliGRAM(s) IV Intermittent every 8 hours  midazolam Infusion 0.02 mG/kG/Hr (1.444 mL/Hr) IV Continuous <Continuous>  multivitamin/minerals 1 Tablet(s) Oral daily  mupirocin 2% Ointment 1 Application(s) Topical two times a day  pantoprazole   Suspension 40 milliGRAM(s) Oral before breakfast    MEDICATIONS  (PRN):  acetaminophen   Tablet .. 650 milliGRAM(s) Oral every 6 hours PRN Temp greater or equal to 38C (100.4F)  glucagon  Injectable 1 milliGRAM(s) IntraMuscular once PRN Glucose LESS THAN 70 milligrams/deciliter      LABS:                          8.1    3.20  )-----------( 44       ( 02 Mar 2020 04:35 )             24.4         Mean Cell Volume : 87.1 fL  Mean Cell Hemoglobin : 28.9 pg  Mean Cell Hemoglobin Concentration : 33.2 g/dL  Auto Neutrophil # : x  Auto Lymphocyte # : x  Auto Monocyte # : x  Auto Eosinophil # : x  Auto Basophil # : x  Auto Neutrophil % : x  Auto Lymphocyte % : x  Auto Monocyte % : x  Auto Eosinophil % : x  Auto Basophil % : x      Serial CBC's  03-02 @ 04:35  Hct-24.4 / Hgb-8.1 / Plat-44 / RBC-2.80 / WBC-3.20  Serial CBC's  03-01 @ 04:50  Hct-27.7 / Hgb-9.4 / Plat-47 / RBC-3.19 / WBC-3.53  Serial CBC's  02-29 @ 15:21  Hct-26.2 / Hgb-8.8 / Plat-48 / RBC-3.00 / WBC-3.64  Serial CBC's  02-29 @ 04:40  Hct-26.1 / Hgb-8.3 / Plat-50 / RBC-2.98 / WBC-4.01  Serial CBC's  02-28 @ 20:20  Hct-28.5 / Hgb-9.3 / Plat-57 / RBC-3.21 / WBC-5.18  Serial CBC's  02-28 @ 12:00  Hct-20.2 / Hgb-6.3 / Plat-58 / RBC-2.19 / WBC-4.95  Serial CBC's  02-28 @ 05:00  Hct-22.1 / Hgb-7.1 / Plat-63 / RBC-2.39 / WBC-6.50  Serial CBC's  02-27 @ 18:34  Hct-24.9 / Hgb-7.8 / Plat-64 / RBC-2.73 / WBC-4.40      03-02    137  |  99  |  37<H>  ----------------------------<  184<H>  4.8   |  26  |  0.6<L>    Ca    7.0<L>      02 Mar 2020 04:35    TPro  4.4<L>  /  Alb  1.7<L>  /  TBili  0.8  /  DBili  x   /  AST  34  /  ALT  44<H>  /  AlkPhos  176<H>  03-02          Reticulocyte Percent: 1.4 % (02-28 @ 05:00)

## 2020-03-02 NOTE — PROGRESS NOTE ADULT - ASSESSMENT
Sons and  angry that had to wait longer than 45minutes for update from primary team. Did not want to start a meeting without hearing from attending. After apologizing, I advised them to stay especially since one son took day off from work so that all could be addressed. Gave my business card. They left. I left x [phone] with Yobani SANCHEZ should family return.

## 2020-03-02 NOTE — PROGRESS NOTE ADULT - ASSESSMENT
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  She has had long, complicated hospital course (see HPI), including intubation x 2, finding of alveolar hemorrhage, candidemia, REJI on CKD III (now resolved), L saphenous DVT, bleeding rectal ulcer, and IVC filter placement.  Hematology consulted for pancytopenia.    1. Pancytopenia-Likely multifactorial, including bone marrow suppression secondary to severe illness, sepsis, antibiotic use, poor nutrition, active bleeding (from alveolar hemorrhage and rectal ulcer), and frequent phlebotomy.  - LDH mildly elevated (400s) but haptoglobin elevated and bilirubin normal, not suggestive of hemolysis.  - Recommend continued supportive care.   - Maintain active type and screen.  - If not bleeding, transfuse for hemoglobin <7 and platelet count < 10.  - Daily CBC with differential.  - Will monitor.  Can start filgrastim if patient becomes neutropenic.    2. 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  Patient is s/p IVC filter placement.    3. Rectal bleed. Needs to be investigated further once she is stabilized.  since drop in H/H is noted , r/o LGIB .     Plan discussed with ICU RN. 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  She has had long, complicated hospital course (see HPI), including intubation x 2, finding of alveolar hemorrhage, candidemia, REJI on CKD III (now resolved), L saphenous DVT, bleeding rectal ulcer, and IVC filter placement.  Hematology consulted for pancytopenia.    1. Pancytopenia-Likely multifactorial, including bone marrow suppression secondary to severe illness, sepsis, antibiotic use, poor nutrition, active bleeding (from alveolar hemorrhage and rectal ulcer), and frequent phlebotomy.  - LDH mildly elevated (400s) but haptoglobin elevated and bilirubin normal, not suggestive of hemolysis.  - Recommend continued supportive care.   - Maintain active type and screen.  - If not bleeding, transfuse for hemoglobin <7 and platelet count < 10.  - Daily CBC with differential.  - Will monitor.  Can start filgrastim if patient becomes neutropenic.    2. 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  Patient is s/p IVC filter placement.    3. Rectal bleed. Needs to be investigated further once she is stabilized.  since drop in H/H is noted , r/o LGIB .     Plan discussed with ICU RN.

## 2020-03-02 NOTE — PROGRESS NOTE ADULT - SUBJECTIVE AND OBJECTIVE BOX
Patient is a 75y old  Female who presents with a chief complaint of SOB and desaturation (02 Mar 2020 08:01)        Over Night Events:  Remains critically ill on MV.  Off pressors.  Sedated.          ROS:     CONSTITUTIONAL:   no fever   no chills.  no weight gain   no weight loss    EYES:   no discharge,   no pain  no redness,   no visual changes.    ENT:   Ears: no ear pain and no hearing problems.  Nose: no nasal congestion and no nasal drainage.  Mouth/Throat: no dysphagia,  no hoarseness and no throat pain.  Neck: no lumps, no pain, no stiffness and no swollen glands.     CARDIOVASCULAR:   no chest pain,   no swelling  no palpitations  no syncope    RESPIRATORY:  Per HPI    GASTROINTESTINAL:   no abdominal pain,   no constipation,   no diarrhea,   no vomiting.    GENITOURINARY:  no dysuria,   no frequency,   no urgency  no hematuria.    MUSCULOSKELETAL:   no back pain,   no musculoskeletal pain,  no weakness.    SKIN:   no jaundice,   no lesions,   no pruritis,   no rashes.    NEURO:   Sedated     PSYCHIATRIC:   no known mental health issues  no anxiety  no depression    ALLERGIC/IMMUNOLOGIC:   No active allergic or immunologic issues        PHYSICAL EXAM    ICU Vital Signs Last 24 Hrs  T(C): 37.3 (02 Mar 2020 04:00), Max: 37.3 (02 Mar 2020 04:00)  T(F): 99.1 (02 Mar 2020 04:00), Max: 99.1 (02 Mar 2020 04:00)  HR: 108 (02 Mar 2020 07:00) (66 - 128)  BP: 173/102 (02 Mar 2020 07:00) (78/49 - 183/91)  BP(mean): 131 (02 Mar 2020 07:00) (58 - 146)  ABP: --  ABP(mean): --  RR: 16 (02 Mar 2020 07:00) (14 - 51)  SpO2: 98% (02 Mar 2020 07:00) (89% - 100%)      CONSTITUTIONAL:   Ill appearing.  Well nourished.  NAD    ENT:   Airway patent,   Mouth with normal mucosa.   No thrush    EYES:   Pupils equal,   Round and reactive to light.    CARDIAC:   Normal rate,   Regular rhythm.    No edema      Vascular:  Normal systolic impulse  No Carotid bruits    RESPIRATORY:   No wheezing  Bilateral BS  Normal chest expansion  Not tachypneic,  No use of accessory muscles    GASTROINTESTINAL:  Abdomen soft,   Non-tender,   No guarding,   + BS    GENITOURINARY  normal genitalia for sex   edema    MUSCULOSKELETAL:   Range of motion is not limited,  No muscle or joint tenderness  No clubbing, cyanosis    NEUROLOGICAL:   Sedated.  Agitated off sedation  Moves all extremities weakly     SKIN:   Skin normal color for race,   Warm and dry  No evidence of rash.    PSYCHIATRIC:   Sedated     HEME LYMPH:   No cervical  lymphadenopathy.  no inguinal lymphadenopathy      03-01-20 @ 07:01  -  03-02-20 @ 07:00  --------------------------------------------------------  IN:    fentaNYL Infusion.: 36 mL    fentaNYL Infusion.: 302.6 mL    Free Water: 750 mL    IV PiggyBack: 50 mL    midazolam Infusion: 30.7 mL    norepinephrine Infusion: 14.1 mL  Total IN: 1183.4 mL    OUT:    Indwelling Catheter - Urethral: 620 mL    Ureteral Catheter: 710 mL  Total OUT: 1330 mL    Total NET: -146.6 mL          LABS:                            8.1    3.20  )-----------( 44       ( 02 Mar 2020 04:35 )             24.4                                               03-02    137  |  99  |  37<H>  ----------------------------<  184<H>  4.8   |  26  |  0.6<L>    Ca    7.0<L>      02 Mar 2020 04:35    TPro  4.4<L>  /  Alb  1.7<L>  /  TBili  0.8  /  DBili  x   /  AST  34  /  ALT  44<H>  /  AlkPhos  176<H>  03-02                                                                                           LIVER FUNCTIONS - ( 02 Mar 2020 04:35 )  Alb: 1.7 g/dL / Pro: 4.4 g/dL / ALK PHOS: 176 U/L / ALT: 44 U/L / AST: 34 U/L / GGT: x                                                                                               Mode: AC/ CMV (Assist Control/ Continuous Mandatory Ventilation)  RR (machine): 16  TV (machine): 450  FiO2: 40  PEEP: 5  ITime: 1  MAP: 10  PIP: 25                                      ABG - ( 02 Mar 2020 04:53 )  pH, Arterial: 7.42  pH, Blood: x     /  pCO2: 40    /  pO2: 68    / HCO3: 26    / Base Excess: 1.4   /  SaO2: 94                  MEDICATIONS  (STANDING):  ALBUTerol    90 MICROgram(s) HFA Inhaler 4 Puff(s) Inhalation every 6 hours  calcitriol  Solution 0.25 MICROGram(s) Oral daily  calcium carbonate    500 mG (Tums) Chewable 3 Tablet(s) Chew every 8 hours  chlorhexidine 0.12% Liquid 15 milliLiter(s) Oral Mucosa two times a day  chlorhexidine 4% Liquid 1 Application(s) Topical <User Schedule>  cyanocobalamin 1000 MICROGram(s) Oral daily  dextrose 5%. 1000 milliLiter(s) (50 mL/Hr) IV Continuous <Continuous>  fentaNYL   Infusion. 0.519 MICROgram(s)/kG/Hr (3.75 mL/Hr) IV Continuous <Continuous>  gabapentin 300 milliGRAM(s) Oral at bedtime  influenza   Vaccine 0.5 milliLiter(s) IntraMuscular once  insulin lispro (HumaLOG) corrective regimen sliding scale   SubCutaneous three times a day before meals  ipratropium 17 MICROgram(s) HFA Inhaler 4 Puff(s) Inhalation every 6 hours  meropenem  IVPB      meropenem  IVPB 1000 milliGRAM(s) IV Intermittent every 8 hours  methylPREDNISolone sodium succinate Injectable 60 milliGRAM(s) IV Push every 12 hours  midazolam Infusion 0.02 mG/kG/Hr (1.444 mL/Hr) IV Continuous <Continuous>  multivitamin/minerals 1 Tablet(s) Oral daily  mupirocin 2% Ointment 1 Application(s) Topical two times a day  norepinephrine Infusion 0.05 MICROgram(s)/kG/Min (6.769 mL/Hr) IV Continuous <Continuous>  pantoprazole   Suspension 40 milliGRAM(s) Oral before breakfast    MEDICATIONS  (PRN):  acetaminophen   Tablet .. 650 milliGRAM(s) Oral every 6 hours PRN Temp greater or equal to 38C (100.4F)  glucagon  Injectable 1 milliGRAM(s) IntraMuscular once PRN Glucose LESS THAN 70 milligrams/deciliter      New X-rays reviewed:                                                                                  ECHO    CXR interpreted by me:  ET OG OK>   Bilateral infiltrates.  TLC OK

## 2020-03-02 NOTE — PROGRESS NOTE ADULT - ASSESSMENT
ASSESSMENT  73y/o F w/ hx of asthma, COPD not on home O2, autoimmune hepatitis on prednisone and tacrolimus, heterozygous prothrombin gene mutation with hx of PE and DVT on coumadin with recent hospitalization in January 2020 with a diagnosis of pneumonia presents for worsening SOB, hemoptysis and cough.    IMPRESSION  #GNR PNA- BAL 2/26 with   Numerous Klebsiella pneumoniae (panS)    Acute hypoxemia, intubated 2/26, possible PE given +DVTs s/p Option Elite IVC filter via Right IJ access.  #GIB  #Thrombocytopenia    continues post stopping linezolid   #Hx recent MRSA VAP    2/14 tracheal cx   Moderate Methicillin resistant Staphylococcus aureus    completed 8 day course, noted to have thrombocytopenia on linezolid  #Candidemia Fungitell: >500 (02-11-20 @ 11:21) likely CLABSI    2/11 BCX +, 2/12 BCX +, 2/13 BCX (-) RIJ 2/11. Groin a-line 2/4- removed 2/13     Fungitell: <31 (02-04-20 @ 04:40), Fungitell: 49 (02-01-20 @ 11:17)    Ophtho- no endophthalmitis   #Flu + AH1, Alveolar hemorrhage, ?superimposed atypical PNA (imaging not really consistent with bacterial PNA). Recent bronch 1/20 negative for PCP, Fungal, etc, previous bronch cx with yeast (likely oral contaminant) since GMS neg    2/3 Bronch   No organisms seen, 2/3 cytopath Rare atypical cells, few ciliated bronchial cells, alveolar macrophages and inflammatory cells, mainly neutrophils.    Strep urine Ag neg, serum crypto Ag neg, CMV PCR undetectable, AFB neg    Quantiferon indeterminate    Lactate Dehydrogenase, Serum: 607 (02.03.20 @ 04:30)    Procalcitonin, Serum: 1.86:    CTA chest showing no PE but showing interval development of multifocal bilateral groundglass opacities as well as new moderate to severe bronchiectasis in the right lung,   1/13 CT b/l GGOs, compatible with intersitial edema      Serum Pro-Brain Natriuretic Peptide: 722 pg/mL (01.31.20 @ 09:25)<-- Serum Pro-Brain Natriuretic Peptide: 345 pg/mL (01.13.20 @ 12:10)    During last hospitalization: bronch cx bacterial NG, +yeast, no ID, neg legionella, + MRSA PCR    MRSA PCR Result.: Positive (02-01-20 @ 20:40)  #Severe sepsis on admission P>90, WBC 19, RR>20, lactic acidosis Lactate, Blood: 2.9 mmol/L (01-31-20 @ 09:25)    afebrile   #Elevated Alk ph, transaminitis  #LLE wound, not infection     12/7 WCX MSSA, Kleb, bacteroides    8/2019 MRSA in a wound   #Immunosuppressed: autoimmune hepatitis on prednisone and tacrolimus    RECOMMENDATIONS  - Kleb in bronch is panS, recommend narrowing shyann to zosyn 3.375 q6h IV  (D5 on abx 2/27-)   - CXR small improvement ARLENE  - grave prognosis

## 2020-03-02 NOTE — PROGRESS NOTE ADULT - SUBJECTIVE AND OBJECTIVE BOX
PATIENT:  DONI PATRICK  [id number]    CHIEF COMPLAINT:  Patient is a 75y old  Female who presents with a chief complaint of SOB and desaturation (02 Mar 2020 07:55)      INTERVAL HISTORYOVERNIGHT EVENTS:          MEDICATIONS:  MEDICATIONS  (STANDING):  ALBUTerol    90 MICROgram(s) HFA Inhaler 4 Puff(s) Inhalation every 6 hours  calcitriol  Solution 0.25 MICROGram(s) Oral daily  calcium carbonate    500 mG (Tums) Chewable 3 Tablet(s) Chew every 8 hours  chlorhexidine 0.12% Liquid 15 milliLiter(s) Oral Mucosa two times a day  chlorhexidine 4% Liquid 1 Application(s) Topical <User Schedule>  cyanocobalamin 1000 MICROGram(s) Oral daily  dextrose 5%. 1000 milliLiter(s) (50 mL/Hr) IV Continuous <Continuous>  fentaNYL   Infusion. 0.519 MICROgram(s)/kG/Hr (3.75 mL/Hr) IV Continuous <Continuous>  gabapentin 300 milliGRAM(s) Oral at bedtime  influenza   Vaccine 0.5 milliLiter(s) IntraMuscular once  insulin lispro (HumaLOG) corrective regimen sliding scale   SubCutaneous three times a day before meals  ipratropium 17 MICROgram(s) HFA Inhaler 4 Puff(s) Inhalation every 6 hours  meropenem  IVPB      meropenem  IVPB 1000 milliGRAM(s) IV Intermittent every 8 hours  methylPREDNISolone sodium succinate Injectable 60 milliGRAM(s) IV Push every 12 hours  midazolam Infusion 0.02 mG/kG/Hr (1.444 mL/Hr) IV Continuous <Continuous>  multivitamin/minerals 1 Tablet(s) Oral daily  mupirocin 2% Ointment 1 Application(s) Topical two times a day  norepinephrine Infusion 0.05 MICROgram(s)/kG/Min (6.769 mL/Hr) IV Continuous <Continuous>  pantoprazole   Suspension 40 milliGRAM(s) Oral before breakfast    MEDICATIONS  (PRN):  acetaminophen   Tablet .. 650 milliGRAM(s) Oral every 6 hours PRN Temp greater or equal to 38C (100.4F)  glucagon  Injectable 1 milliGRAM(s) IntraMuscular once PRN Glucose LESS THAN 70 milligrams/deciliter      ALLERGIES:  Allergies    No Known Allergies    Intolerances        OBJECTIVE:  ICU Vital Signs Last 24 Hrs  T(C): 37.3 (02 Mar 2020 04:00), Max: 37.3 (02 Mar 2020 04:00)  T(F): 99.1 (02 Mar 2020 04:00), Max: 99.1 (02 Mar 2020 04:00)  HR: 108 (02 Mar 2020 07:00) (66 - 128)  BP: 173/102 (02 Mar 2020 07:00) (78/49 - 183/91)  BP(mean): 131 (02 Mar 2020 07:00) (58 - 146)  ABP: --  ABP(mean): --  RR: 16 (02 Mar 2020 07:00) (14 - 51)  SpO2: 98% (02 Mar 2020 07:00) (89% - 100%)    Mode: AC/ CMV (Assist Control/ Continuous Mandatory Ventilation)  RR (machine): 16  TV (machine): 450  FiO2: 40  PEEP: 5  ITime: 1  MAP: 10  PIP: 25    Adult Advanced Hemodynamics Last 24 Hrs  CVP(mm Hg): --  CVP(cm H2O): --  CO: --  CI: --  PA: --  PA(mean): --  PCWP: --  SVR: --  SVRI: --  PVR: --  PVRI: --  CAPILLARY BLOOD GLUCOSE      POCT Blood Glucose.: 185 mg/dL (02 Mar 2020 05:33)  POCT Blood Glucose.: 157 mg/dL (01 Mar 2020 23:27)  POCT Blood Glucose.: 235 mg/dL (01 Mar 2020 17:20)  POCT Blood Glucose.: 213 mg/dL (01 Mar 2020 13:56)    CAPILLARY BLOOD GLUCOSE      POCT Blood Glucose.: 185 mg/dL (02 Mar 2020 05:33)    I&O's Summary    01 Mar 2020 07:01  -  02 Mar 2020 07:00  --------------------------------------------------------  IN: 1183.4 mL / OUT: 1330 mL / NET: -146.6 mL      Daily     Daily Weight in k.1 (02 Mar 2020 06:00)    PHYSICAL EXAMINATION:  General: sedated  HEENT: PEERLA, sedated  Neurology: sedated  Respiratory: b/ l breath sounds  CV: RRR, S1S2, no murmurs, rubs or gallops  Abdominal: Soft, ND  Extremities: peripheral edema all extremities, + peripheral pulses  Incisions:   Tubes: intubated    LABS:  ABG - ( 02 Mar 2020 04:53 )  pH, Arterial: 7.42  pH, Blood: x     /  pCO2: 40    /  pO2: 68    / HCO3: 26    / Base Excess: 1.4   /  SaO2: 94                                      8.1    3.20  )-----------( 44       ( 02 Mar 2020 04:35 )             24.4     03-    137  |  99  |  37<H>  ----------------------------<  184<H>  4.8   |  26  |  0.6<L>    Ca    7.0<L>      02 Mar 2020 04:35    TPro  4.4<L>  /  Alb  1.7<L>  /  TBili  0.8  /  DBili  x   /  AST  34  /  ALT  44<H>  /  AlkPhos  176<H>  03-    LIVER FUNCTIONS - ( 02 Mar 2020 04:35 )  Alb: 1.7 g/dL / Pro: 4.4 g/dL / ALK PHOS: 176 U/L / ALT: 44 U/L / AST: 34 U/L / GGT: x                       TELEMETRY:     EKG:     IMAGING:

## 2020-03-02 NOTE — PROGRESS NOTE ADULT - SUBJECTIVE AND OBJECTIVE BOX
75yFemale with diagnosis: PNEUMONIA, SEPSIS    Interim events   hem-onc input appreciated  ?LGIB - if stabilizes need to work-up  pancytopenia - multifactorial  Code Status: FULL, palliative consult; Condition is very poor; /HCP has expressed possible wishes to go to less aggressive therapy.  .  Continue to try to review with him.  Coumadin continues to be held due to alveolar hemmorhage  GI - Pt  s/p flex sig on 2/21 that showed rectal ulcer with visible vessel sp 4 clips applied; S/P Flex sigmoidoscopy 2/28/2020 : Old blood until 25cm, no active bleeding, previous clips in place. poor prognosis    Patient seen,       PHYSICAL EXAM    T(C): , Max: 37.3 (04:00)  T(F): 98.4  HR: 100 (66 - 128)  BP: 121/59 (78/49 - 187/94)  RR: 31 (12 - 51)  SpO2: 100% (89% - 100%)      LABS:                          8.1    3.20  )-----------( 44       ( 02 Mar 2020 04:35 )             24.4                                                                                      03-02    137  |  99  |  37<H>  ----------------------------<  184<H>  4.8   |  26  |  0.6<L>    Ca    7.0<L>      02 Mar 2020 04:35    TPro  4.4<L>  /  Alb  1.7<L>  /  TBili  0.8  /  DBili  x   /  AST  34  /  ALT  44<H>  /  AlkPhos  176<H>  03-02                                                      MEDICATIONS  (STANDING):  ALBUTerol    90 MICROgram(s) HFA Inhaler 4 Puff(s) Inhalation every 6 hours  calcitriol  Solution 0.25 MICROGram(s) Oral daily  calcium carbonate    500 mG (Tums) Chewable 3 Tablet(s) Chew every 8 hours  chlorhexidine 0.12% Liquid 15 milliLiter(s) Oral Mucosa two times a day  chlorhexidine 4% Liquid 1 Application(s) Topical <User Schedule>  cyanocobalamin 1000 MICROGram(s) Oral daily  dextrose 5%. 1000 milliLiter(s) (50 mL/Hr) IV Continuous <Continuous>  fentaNYL   Infusion. 0.519 MICROgram(s)/kG/Hr (3.75 mL/Hr) IV Continuous <Continuous>  gabapentin 300 milliGRAM(s) Oral at bedtime  influenza   Vaccine 0.5 milliLiter(s) IntraMuscular once  insulin lispro (HumaLOG) corrective regimen sliding scale   SubCutaneous three times a day before meals  ipratropium 17 MICROgram(s) HFA Inhaler 4 Puff(s) Inhalation every 6 hours  meropenem  IVPB      meropenem  IVPB 1000 milliGRAM(s) IV Intermittent every 8 hours  midazolam Infusion 0.02 mG/kG/Hr (1.444 mL/Hr) IV Continuous <Continuous>  multivitamin/minerals 1 Tablet(s) Oral daily  mupirocin 2% Ointment 1 Application(s) Topical two times a day  pantoprazole   Suspension 40 milliGRAM(s) Oral before breakfast    MEDICATIONS  (PRN):  acetaminophen   Tablet .. 650 milliGRAM(s) Oral every 6 hours PRN Temp greater or equal to 38C (100.4F)  glucagon  Injectable 1 milliGRAM(s) IntraMuscular once PRN Glucose LESS THAN 70 milligrams/deciliter 75yFemale with diagnosis: PNEUMONIA, SEPSIS    Interim events   hem-onc input appreciated  ?LGIB - if stabilizes need to work-up  pancytopenia - multifactorial  Code Status: FULL, palliative consult; Condition is very poor; /HCP has expressed possible wishes to go to less aggressive therapy.  .  Continue to try to review with him.  Coumadin continues to be held due to alveolar hemmorhage  GI - Pt  s/p flex sig on 2/21 that showed rectal ulcer with visible vessel sp 4 clips applied; S/P Flex sigmoidoscopy 2/28/2020 : Old blood until 25cm, no active bleeding, previous clips in place. poor prognosis    Patient seen, two sons at bedside and  at 1100. Primary team not available and seeking update from attending. Discussed with Palliative Care Team and Dr. Baeza, Dr. Espinal and nursing      PHYSICAL EXAM deferred.     T(C): , Max: 37.3 (04:00)  T(F): 98.4  HR: 100 (66 - 128)  BP: 121/59 (78/49 - 187/94)  RR: 31 (12 - 51)  SpO2: 100% (89% - 100%)      LABS:                          8.1    3.20  )-----------( 44       ( 02 Mar 2020 04:35 )             24.4                                                                                      03-02    137  |  99  |  37<H>  ----------------------------<  184<H>  4.8   |  26  |  0.6<L>    Ca    7.0<L>      02 Mar 2020 04:35    TPro  4.4<L>  /  Alb  1.7<L>  /  TBili  0.8  /  DBili  x   /  AST  34  /  ALT  44<H>  /  AlkPhos  176<H>  03-02                                                      MEDICATIONS  (STANDING):  ALBUTerol    90 MICROgram(s) HFA Inhaler 4 Puff(s) Inhalation every 6 hours  calcitriol  Solution 0.25 MICROGram(s) Oral daily  calcium carbonate    500 mG (Tums) Chewable 3 Tablet(s) Chew every 8 hours  chlorhexidine 0.12% Liquid 15 milliLiter(s) Oral Mucosa two times a day  chlorhexidine 4% Liquid 1 Application(s) Topical <User Schedule>  cyanocobalamin 1000 MICROGram(s) Oral daily  dextrose 5%. 1000 milliLiter(s) (50 mL/Hr) IV Continuous <Continuous>  fentaNYL   Infusion. 0.519 MICROgram(s)/kG/Hr (3.75 mL/Hr) IV Continuous <Continuous>  gabapentin 300 milliGRAM(s) Oral at bedtime  influenza   Vaccine 0.5 milliLiter(s) IntraMuscular once  insulin lispro (HumaLOG) corrective regimen sliding scale   SubCutaneous three times a day before meals  ipratropium 17 MICROgram(s) HFA Inhaler 4 Puff(s) Inhalation every 6 hours  meropenem  IVPB      meropenem  IVPB 1000 milliGRAM(s) IV Intermittent every 8 hours  midazolam Infusion 0.02 mG/kG/Hr (1.444 mL/Hr) IV Continuous <Continuous>  multivitamin/minerals 1 Tablet(s) Oral daily  mupirocin 2% Ointment 1 Application(s) Topical two times a day  pantoprazole   Suspension 40 milliGRAM(s) Oral before breakfast    MEDICATIONS  (PRN):  acetaminophen   Tablet .. 650 milliGRAM(s) Oral every 6 hours PRN Temp greater or equal to 38C (100.4F)  glucagon  Injectable 1 milliGRAM(s) IntraMuscular once PRN Glucose LESS THAN 70 milligrams/deciliter

## 2020-03-02 NOTE — PROGRESS NOTE ADULT - SUBJECTIVE AND OBJECTIVE BOX
Chart reviewed, patient examined. Pertinent results reviewed.  Case discussed with BONNIE; specialist f/u reviewed- Multiple updated evaluation  HD#31; status post reintubation on 2/26    CHIEF COMPLAINT:  Patient is a 75y old  Female who presented with a chief complaint of SOB and desaturation (29 Feb 2020 09:19), Who has had a long and very complicated hospitalization including intubation x2.  She now remains in the ICU intubated,  sedated with IV fentanyl, on FiO2 40%, on/off pressors.    INTERVAL HISTORYOVERNIGHT EVENTS: Her GI bleeding has eased.      MEDICATIONS:  MEDICATIONS  (STANDING):  ALBUTerol    90 MICROgram(s) HFA Inhaler 4 Puff(s) Inhalation every 6 hours  calcitriol  Solution 0.25 MICROGram(s) Oral daily  calcium carbonate    500 mG (Tums) Chewable 3 Tablet(s) Chew every 8 hours  chlorhexidine 0.12% Liquid 15 milliLiter(s) Oral Mucosa two times a day  chlorhexidine 4% Liquid 1 Application(s) Topical <User Schedule>  cyanocobalamin 1000 MICROGram(s) Oral daily  dextrose 5%. 1000 milliLiter(s) (50 mL/Hr) IV Continuous <Continuous>  fentaNYL   Infusion. 0.519 MICROgram(s)/kG/Hr (3.75 mL/Hr) IV Continuous <Continuous>  gabapentin 300 milliGRAM(s) Oral at bedtime  influenza   Vaccine 0.5 milliLiter(s) IntraMuscular once  insulin lispro (HumaLOG) corrective regimen sliding scale   SubCutaneous three times a day before meals  ipratropium 17 MICROgram(s) HFA Inhaler 4 Puff(s) Inhalation every 6 hours  meropenem  IVPB      meropenem  IVPB 1000 milliGRAM(s) IV Intermittent every 8 hours  methylPREDNISolone sodium succinate Injectable 60 milliGRAM(s) IV Push every 12 hours  midazolam Infusion 0.02 mG/kG/Hr (1.444 mL/Hr) IV Continuous <Continuous>  multivitamin/minerals 1 Tablet(s) Oral daily  mupirocin 2% Ointment 1 Application(s) Topical two times a day  norepinephrine Infusion 0.05 MICROgram(s)/kG/Min (6.769 mL/Hr) IV Continuous <Continuous>  pantoprazole   Suspension 40 milliGRAM(s) Oral before breakfast    MEDICATIONS  (PRN):  acetaminophen   Tablet .. 650 milliGRAM(s) Oral every 6 hours PRN Temp greater or equal to 38C (100.4F)  glucagon  Injectable 1 milliGRAM(s) IntraMuscular once PRN Glucose LESS THAN 70 milligrams/deciliter      ALLERGIES:  Allergies    No Known Allergies    Intolerances        OBJECTIVE:  ICU Vital Signs Last 24 Hrs  T(C): 37.3 (02 Mar 2020 04:00), Max: 37.3 (02 Mar 2020 04:00)  T(F): 99.1 (02 Mar 2020 04:00), Max: 99.1 (02 Mar 2020 04:00)  HR: 108 (02 Mar 2020 07:00) (66 - 128)  BP: 173/102 (02 Mar 2020 07:00) (78/49 - 183/91)  BP(mean): 131 (02 Mar 2020 07:00) (58 - 146)  ABP: --  ABP(mean): --  RR: 16 (02 Mar 2020 07:00) (14 - 51)  SpO2: 98% (02 Mar 2020 07:00) (89% - 100%)        Mode: AC/ CMV (Assist Control/ Continuous Mandatory Ventilation)  RR (machine): 16  TV (machine): 450  FiO2: 40- x 2 D now  PEEP: 5      Daily     Daily Weight in kG:     PHYSICAL EXAMINATION:  General: intubated, sedated; ETT/OGT; Multiple ecchymoses and extremity wraps  HEENT: PERRLA  Neurology: sedated  Respiratory:  b/l Scattered rhonchi; DECreased BS  CV: , S1S2, no murmurs, rubs or gallops  Abdominal: Soft, ND  Extremities: peripheral edema, + peripheral pulses; many Ecchymoses with few avulsions  Incisions:   Tubes: intubated    LABS:  ABG - ( 01 Mar 2020 04:48 )  pH, Arterial: 7.46  pH, Blood: x     /  pCO2: 34    /  pO2: 57    / HCO3: 24    / Base Excess: 0.7   /  SaO2: 91                                  8.3    4.01  )-----------( 50       ( 29 Feb 2020 04:40 )             26.1     02-29    141  |  103  |  45<H>  ----------------------------<  226<H>  4.6   |  25  |  0.7    Ca    7.6<L>      29 Feb 2020 04:40    TPro  4.4<L>  /  Alb  1.9<L>  /  TBili  0.8  /  DBili  x   /  AST  27  /  ALT  48<H>  /  AlkPhos  156<H>  02-29    LIVER FUNCTIONS - ( 29 Feb 2020 04:40 )  Alb: 1.9 g/dL / Pro: 4.4 g/dL / ALK PHOS: 156 U/L / ALT: 48 U/L / AST: 27 U/L / GGT: x           PT/INR - ( 29 Feb 2020 04:40 )   PT: 13.50 sec;   INR: 1.17 ratio         PTT - ( 29 Feb 2020 04:40 )  PTT:24.5 sec    CARDIAC MARKERS ( 28 Feb 2020 05:00 )  x     / 0.11 ng/mL / x     / x     / x      CARDIAC MARKERS ( 27 Feb 2020 14:18 )  x     / 0.13 ng/mL / x     / x     / x              TELEMETRY:     EKG:     IMAGING: Chart reviewed, patient examined. Pertinent results reviewed.  Case discussed with HO; specialist f/u reviewed- Multiple updated evaluation  HD#31; status post reintubation on 2/26;  Reviewed on rounds with ICU team    CHIEF COMPLAINT:  Patient is a 75y old  Female who presented with a chief complaint of SOB and desaturation (29 Feb 2020 09:19), Who has had a long and very complicated hospitalization including intubation x2, The second time for acute respiratory failure 2/2 a PE.  She now remains in the ICU intubated,  sedated with IV fentanyl, on FiO2 50%, on/off pressors.  No further GI bleeding noted    INTERVAL HISTORY OVERNIGHT EVENTS: Her GI bleeding has eased.      MEDICATIONS:  MEDICATIONS  (STANDING):  ALBUTerol    90 MICROgram(s) HFA Inhaler 4 Puff(s) Inhalation every 6 hours  calcitriol  Solution 0.25 MICROGram(s) Oral daily  calcium carbonate    500 mG (Tums) Chewable 3 Tablet(s) Chew every 8 hours  chlorhexidine 0.12% Liquid 15 milliLiter(s) Oral Mucosa two times a day  chlorhexidine 4% Liquid 1 Application(s) Topical <User Schedule>  cyanocobalamin 1000 MICROGram(s) Oral daily  dextrose 5%. 1000 milliLiter(s) (50 mL/Hr) IV Continuous <Continuous>  fentaNYL   Infusion. 0.519 MICROgram(s)/kG/Hr (3.75 mL/Hr) IV Continuous <Continuous>  gabapentin 300 milliGRAM(s) Oral at bedtime  influenza   Vaccine 0.5 milliLiter(s) IntraMuscular once  insulin lispro (HumaLOG) corrective regimen sliding scale   SubCutaneous three times a day before meals  ipratropium 17 MICROgram(s) HFA Inhaler 4 Puff(s) Inhalation every 6 hours  meropenem  IVPB      meropenem  IVPB 1000 milliGRAM(s) IV Intermittent every 8 hours  methylPREDNISolone sodium succinate Injectable 60 milliGRAM(s) IV Push every 12 hours  midazolam Infusion 0.02 mG/kG/Hr (1.444 mL/Hr) IV Continuous <Continuous>  multivitamin/minerals 1 Tablet(s) Oral daily  mupirocin 2% Ointment 1 Application(s) Topical two times a day  norepinephrine Infusion 0.05 MICROgram(s)/kG/Min (6.769 mL/Hr) IV Continuous <Continuous>  pantoprazole   Suspension 40 milliGRAM(s) Oral before breakfast    MEDICATIONS  (PRN):  acetaminophen   Tablet .. 650 milliGRAM(s) Oral every 6 hours PRN Temp greater or equal to 38C (100.4F)  glucagon  Injectable 1 milliGRAM(s) IntraMuscular once PRN Glucose LESS THAN 70 milligrams/deciliter      ALLERGIES:  Allergies    No Known Allergies    Intolerances        OBJECTIVE:  ICU Vital Signs Last 24 Hrs  T(C): 37.3 (02 Mar 2020 04:00), Max: 37.3 (02 Mar 2020 04:00)  T(F): 99.1 (02 Mar 2020 04:00), Max: 99.1 (02 Mar 2020 04:00)  HR: 108 (02 Mar 2020 07:00) (66 - 128)  BP: 173/102 (02 Mar 2020 07:00) (78/49 - 183/91)  BP(mean): 131 (02 Mar 2020 07:00) (58 - 146)  ABP: --  ABP(mean): --  RR: 16 (02 Mar 2020 07:00) (14 - 51)  SpO2: 98% (02 Mar 2020 07:00) (89% - 100%)        Mode: AC/ CMV (Assist Control/ Continuous Mandatory Ventilation)  RR (machine): 16  TV (machine): 450  FiO2: 50%- Just increased this a.m.  PEEP: 5  Fentanyl just turned off      Daily     Daily Weight in kG:     PHYSICAL EXAMINATION:  General: intubated, sedated; ETT/OGT; Multiple ecchymoses and extremity wraps  HEENT: PERRLA  Neurology: sedated  Respiratory:   rhonchi R>L; Good BS today  CV: , S1S2, no murmurs, rubs or gallops  Abdominal: Soft, ND  Extremities: peripheral edema, + peripheral pulses; many Ecchymoses with few avulsions- Crusted  Tubes: intubated    LABS:  ABG - ( 02 Mar 2020 08:53 )  pH, Arterial: 7.44  pH, Blood: x     /  pCO2: 40    /  pO2: 54    / HCO3: 27    / Base Excess: 3.1   /  SaO2: 89        ABG - ( 01 Mar 2020 04:48 )  pH, Arterial: 7.46  pH, Blood: x     /  pCO2: 34    /  pO2: 57    / HCO3: 24    / Base Excess: 0.7   /  SaO2: 91                              8.1    3.20  )-----------( 44       ( 02 Mar 2020 04:35 )             24.4     03-02    137  |  99  |  37<H>  ----------------------------<  184<H>  4.8   |  26  |  0.6<L>    Ca    7.0<L>      02 Mar 2020 04:35  TPro  4.4<L>  /  Alb  1.7<L>  /  TBili  0.8  /  DBili  x   /  AST  34  /  ALT  44<H>  /  AlkPhos  176<H>

## 2020-03-02 NOTE — PROGRESS NOTE ADULT - ASSESSMENT
74y female with PMH of asthma, COPD not on home O2, autoimmune hepatitis on prednisone and tacrolimus, heterozygous prothrombin gene mutation with hx of PE and DVT on coumadin presents to the hospital complaining of cough, hemoptysis. Found to have viral pneumonia with respiratory failure and intubated. Extubated after prolonged course but required re-intubation 2/26 for worsening respiratory status and received IVC filter 2/26 because pt not safe for AC (alveolar hemorrhage).     Palliative meeting on Monday poor prognosis    #) Diffuse Alveoloar hemorrhage  - completed Zyvox, Levaquin, Cefepime course, and oseltamivir   - C/w Duoneb   - Solumedrol IV 60mg q12  - Bronch results - neg culture, neg fungal, cytology positive for inflammatory cells, no organisms  - Bronch 2/16 showed bleeding from DAH  - Bronch 2/26 for BAL, cultures show gram - rods, start meropenem 2/27    #) Elevated lactate/tropnemia  - on meropenem for positive bronchial culture  - tropnemia due to demand ischemia?  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  - duplex shows DVT consistent with prior  - not candidate for AC at this time due to alveolar hemorrhage   - IVC filter placed 2/26    #Immunosuppressed: Autoimmune Hepatitis   - solumedrol 60 mg qd  - holding home prednisone 60 mg PO qd   - CMV PCR neg this admission   - f/u repeat tarcolimus level    #HTN  - Continue amlodipine 10 qd, Lopressor 50 BID    #Heterozygous prothrombin gene mutation with hx of PE and DVT on coumadin  - holding coumadin for now due to alveolar hemorrhage   - monitor coags    #0.5 cm of GB polyp  - repeat US abdomen in 6 months per GI    #Hx of asthma  - C/w montelukast qd    #Deconditioning   - pt severely deconditioned due to prolonged hospital stay  - PT/Rehab     #) MISC  Activity: intubated  DVT ppx: IVC filter  GI ppx: protonix  Diet: tube feeding  Code: Full  Dispo: medical optimization  CHG

## 2020-03-02 NOTE — PROGRESS NOTE ADULT - ASSESSMENT
IMPRESSION:    Acute hypoxic respiratory failure likely VTE SP GFF   Klebsiella in BAL being treated   DAH resolving  Candidemia SP therapy   HO Autoimmune hepatitis on tacrolimus and solumedrol        PLAN:    CNS:  SAT.  trial of Precedex.     HEENT: ET care.  Oral care.      PULMONARY:  HOB @ 45 degrees. Solumedrol 60 mg q24h.  SBT     CARDIOVASCULAR:  I=O.      GI: GI prophylaxis.  OG feeding.      RENAL:  Follow up lytes. Replete as needed.     INFECTIOUS DISEASE: Follow up cultures. Finish ABX course     HEMATOLOGICAL:  DVT prophylaxis.  FU CBC and coags     ENDOCRINE:  Follow up FS.  Insulin protocol if needed.    MUSCULOSKELETAL: Bed rest     Prognosis: Poor prognosis     Voiding trial

## 2020-03-02 NOTE — PROGRESS NOTE ADULT - SUBJECTIVE AND OBJECTIVE BOX
DARNELL, DONI  75y, Female  Allergy: No Known Allergies      LOS  31d    CHIEF COMPLAINT: SOB and desaturation (01 Mar 2020 13:10)      INTERVAL EVENTS/HPI  - No acute events overnight, 50%, no pressors  - T(F): , Max: 97.1 (03-02-20 @ 00:00)  - WBC Count: 3.20 (03-02-20 @ 04:35)  WBC Count: 3.53 (03-01-20 @ 04:50)  - Creatinine, Serum: 0.6 (03-02-20 @ 04:35)  Creatinine, Serum: 0.7 (03-01-20 @ 04:50)       ROS  unable to obtain history secondary to patient's mental status and/or sedation     VITALS:  T(F): 97.1, Max: 97.1 (03-02-20 @ 00:00)  HR: 76  BP: 104/53  RR: 16Vital Signs Last 24 Hrs  T(C): 36.2 (02 Mar 2020 00:00), Max: 36.2 (02 Mar 2020 00:00)  T(F): 97.1 (02 Mar 2020 00:00), Max: 97.1 (02 Mar 2020 00:00)  HR: 76 (02 Mar 2020 03:30) (66 - 128)  BP: 104/53 (02 Mar 2020 03:30) (78/49 - 183/91)  BP(mean): 72 (02 Mar 2020 03:30) (58 - 146)  RR: 16 (02 Mar 2020 03:30) (14 - 51)  SpO2: 94% (02 Mar 2020 03:30) (89% - 100%)    PHYSICAL EXAM:  Gen: intubated  HEENT: Normocephalic, atraumatic  Neck: supple, no lymphadenopathy  CV: Regular rate & regular rhythm  Lungs: decreased BS at bases, no fremitus  Abdomen: Soft, BS present  Ext: Warm, well perfused, anasarca  Neuro: sedated  Skin: no rash, no erythema, multiple ecchymoses  Lines: no phlebitis, IJ, midline    FH: Non-contributory  Social Hx: Non-contributory    TESTS & MEASUREMENTS:                        8.1    3.20  )-----------( 44       ( 02 Mar 2020 04:35 )             24.4     03-02    137  |  99  |  37<H>  ----------------------------<  184<H>  4.8   |  26  |  0.6<L>    Ca    7.0<L>      02 Mar 2020 04:35    TPro  4.4<L>  /  Alb  1.7<L>  /  TBili  0.8  /  DBili  x   /  AST  34  /  ALT  44<H>  /  AlkPhos  176<H>  03-02    eGFR if Non African American: 89 mL/min/1.73M2 (03-02-20 @ 04:35)  eGFR if African American: 103 mL/min/1.73M2 (03-02-20 @ 04:35)    LIVER FUNCTIONS - ( 02 Mar 2020 04:35 )  Alb: 1.7 g/dL / Pro: 4.4 g/dL / ALK PHOS: 176 U/L / ALT: 44 U/L / AST: 34 U/L / GGT: x               Culture - Acid Fast - Bronchial w/Smear (collected 02-26-20 @ 14:45)  Source: .Bronchial None  Preliminary Report (02-29-20 @ 15:04):    Culture is being performed.    Culture - Fungal, Bronchial (collected 02-26-20 @ 14:45)  Source: .Bronchial None  Preliminary Report (02-27-20 @ 08:21):    Testing in progress    Culture - Bronchial (collected 02-26-20 @ 14:45)  Source: .Bronchial None  Gram Stain (02-27-20 @ 07:36):    Moderate polymorphonuclear leukocytes seen per low power field    Rare Squamous epithelial cells seen per low power field    Numerous Gram Negative Rods seen per oil power field  Final Report (02-28-20 @ 21:13):    Numerous Klebsiella pneumoniae    Normal Respiratory Nikki present  Organism: Klebsiella pneumoniae (02-28-20 @ 21:13)  Organism: Klebsiella pneumoniae (02-28-20 @ 21:13)      -  Amikacin: S <=16      -  Amoxicillin/Clavulanic Acid: S <=8/4      -  Ampicillin: R >16 These ampicillin results predict results for amoxicillin      -  Ampicillin/Sulbactam: S 8/4 Enterobacter, Citrobacter, and Serratia may develop resistance during prolonged therapy (3-4 days)      -  Aztreonam: S <=4      -  Cefazolin: S <=2 Enterobacter, Citrobacter, and Serratia may develop resistance during prolonged therapy (3-4 days)      -  Cefepime: S <=2      -  Cefoxitin: S <=8      -  Ceftriaxone: S <=1 Enterobacter, Citrobacter, and Serratia may develop resistance during prolonged therapy      -  Ciprofloxacin: S <=1      -  Ertapenem: S <=0.5      -  Gentamicin: S <=2      -  Imipenem: S <=1      -  Levofloxacin: S <=2      -  Meropenem: S <=1      -  Piperacillin/Tazobactam: S <=8      -  Tobramycin: S <=2      -  Trimethoprim/Sulfamethoxazole: S <=2/38      Method Type: EROS    Culture - Blood (collected 02-17-20 @ 05:17)  Source: .Blood None  Final Report (02-22-20 @ 14:00):    No growth at 5 days.    Culture - Blood (collected 02-15-20 @ 04:57)  Source: .Blood None  Final Report (02-20-20 @ 18:01):    No growth at 5 days.    Culture - Sputum (collected 02-14-20 @ 17:00)  Source: .Sputum Sputum  Gram Stain (02-15-20 @ 04:56):    Few Squamous epithelial cells per low power field    Few polymorphonuclear leukocytes per low power field    Few Yeast like cells per oil power field    Few Gram variable coccobacilli per oil power field  Final Report (02-16-20 @ 17:44):    Moderate Methicillin resistant Staphylococcus aureus    Normal Respiratory Nikki present  Organism: Methicillin resistant Staphylococcus aureus (02-16-20 @ 17:44)  Organism: Methicillin resistant Staphylococcus aureus (02-16-20 @ 17:44)      -  Ampicillin/Sulbactam: R <=8/4      -  Cefazolin: R <=4      -  Clindamycin: S <=0.25      -  Erythromycin: R >4      -  Gentamicin: S <=1 Should not be used as monotherapy      -  Linezolid: S 2      -  Oxacillin: R >2      -  Penicillin: R >8      -  RIF- Rifampin: S <=1 Should not be used as monotherapy      -  Tetra/Doxy: S <=1      -  Trimethoprim/Sulfamethoxazole: S <=0.5/9.5      -  Vancomycin: S 2      Method Type: EROS    Culture - Blood (collected 02-14-20 @ 04:30)  Source: .Blood Blood-Peripheral  Final Report (02-19-20 @ 14:00):    No growth at 5 days.    Culture - Other (collected 02-13-20 @ 18:55)  Source: .Other wound  Final Report (02-15-20 @ 19:22):    No growth    Culture - Blood (collected 02-12-20 @ 04:50)  Source: .Blood None  Gram Stain (02-13-20 @ 14:24):    Growth in aerobic bottle: Yeast like cells  Final Report (02-18-20 @ 15:03):    Growth in aerobic bottle: Candida albicans    See previous culture [id number]    Culture - Blood (collected 02-11-20 @ 11:21)  Source: .Blood None  Gram Stain (02-12-20 @ 16:48):    Growth in aerobic bottle: Yeast like cells  Final Report (02-15-20 @ 14:05):    Growth in aerobic bottle: Candida albicans    ***Blood Panel PCR results on this specimen are available    approximately 3 hours after the Gram stain result.***    Gram stain, PCR, and/or culture results may not always    correspond due to difference in methodologies.    ************************************************************    This PCR assay was performed using [employer].    The following targets are tested for: Enterococcus,    vancomycin resistant enterococci, Listeria monocytogenes,    coagulase negative staphylococci, S. aureus,    methicillin resistant S. aureus, Streptococcus agalactiae    (Group B), S. pneumoniae, S. pyogenes (Group A),    Acinetobacter baumannii, Enterobacter cloacae, E. coli,    Klebsiella oxytoca, K. pneumoniae, Proteus sp.,    Serratia marcescens, Haemophilus influenzae,    Neisseria meningitidis, Pseudomonas aeruginosa, Candida    albicans, C. glabrata, C krusei, C parapsilosis,    C. tropicalis and the KPC resistance gene.  Organism: Blood Culture PCR  Candida albicans (02-15-20 @ 14:05)  Organism: Candida albicans (02-15-20 @ 14:05)      -  Fluconazole: S 0.25 Fluconazole = Data established for mucosal disease, and is generally applicable for invasive disease.  Susceptibility is dependent on achieving the maximal possible blood level.  Doses of 400 MG/day or more may be required in adults with normal renalfunction and body habitus.      -  Interpretation: See note This test method is not approved by the FDA and is for research use only.  The performance characteristics of this assay may have been determined by [employer] and [facility], [place], [place]                            N/I - No interpretation available                                                         SDD – Sensitive Dose Dependent      Method Type: YSTMIC  Organism: Blood Culture PCR (02-15-20 @ 14:05)      -  Candida albicans: Detec      Method Type: PCR    Culture - Acid Fast - Bronchial w/Smear (collected 02-03-20 @ 15:00)  Source: .Bronchial None  Preliminary Report (02-12-20 @ 15:05):    No growth at 1 week.    Culture - Fungal, Bronchial (collected 02-03-20 @ 15:00)  Source: .Bronchial None  Preliminary Report (02-12-20 @ 15:02):    No growth    Culture - Bronchial (collected 02-03-20 @ 15:00)  Source: .Bronchial None  Gram Stain (02-04-20 @ 05:52):    No polymorphonuclear cells seen per low power field    No squamous epithelial cells per low power field    No organisms seen  Final Report (02-05-20 @ 19:32):    Normal Respiratory Nikki present        Lactate, Blood: 3.1 mmol/L (02-28-20 @ 05:00)  Lactate, Blood: 1.5 mmol/L (02-27-20 @ 18:34)  Lactate, Blood: 2.2 mmol/L (02-27-20 @ 14:18)  Lactate, Blood: 2.6 mmol/L (02-27-20 @ 10:12)      INFECTIOUS DISEASES TESTING  Fungitell: >500 (02-11-20 @ 11:21)  Fungitell: <31 (02-04-20 @ 04:40)  Streptococcus Pneumoniae Ag Urine: Negative (02-02-20 @ 11:00)  MRSA PCR Result.: Positive (02-01-20 @ 20:40)  Procalcitonin, Serum: 1.86 (02-01-20 @ 20:30)  Procalcitonin, Serum: 1.35 (02-01-20 @ 11:17)  Fungitell: 49 (02-01-20 @ 11:17)  Procalcitonin, Serum: 0.77 (02-01-20 @ 04:46)  Rapid RVP Result: Detected (01-31-20 @ 16:24)  Legionella Antigen, Urine: Negative (01-31-20 @ 15:36)  Streptococcus Pneumoniae Ag Urine: Negative (01-31-20 @ 15:36)  Legionella Antigen, Urine: Negative (01-20-20 @ 02:50)  Procalcitonin, Serum: 0.05 (01-16-20 @ 11:30)  MRSA PCR Result.: Positive (01-14-20 @ 13:59)  Flu A Result: Negative (01-13-20 @ 13:10)  Flu B Result: Negative (01-13-20 @ 13:10)  RSV Result: Negative (01-13-20 @ 13:10)  MRSA PCR Result.: Negative (08-13-19 @ 08:46)      RADIOLOGY & ADDITIONAL TESTS:  I have personally reviewed the last available Chest xray  CXR  Xray Chest 1 View- PORTABLE-Urgent:   EXAM:  XR CHEST PORTABLE URGENT 1V            PROCEDURE DATE:  02/28/2020            INTERPRETATION:  Clinical History / Reason for exam: Endotracheal tube adjustment    Comparison : Chest radiograph earlier the same day.    Technique/Positioning: Portable AP.    Findings:    Support devices: The endotracheal tube is now more superior, approximately 2.5 cm above the baljinder. Stable enteric tube and right neck central catheter    Cardiac/mediastinum/hilum: Stable.    Lung parenchyma/Pleura: Improving bilateral diffuse airspace opacities. No pneumothorax.    Skeleton/soft tissues: Stable.    Impression:      Improving bilateral airspace opacities.    Endotracheal tube in better position.                  LAYA GEE M.D., ATTENDING RADIOLOGIST  This document has been electronically signed. Feb 29 2020 11:40AM             (02-28-20 @ 18:14)      CT      CARDIOLOGY TESTING  Transthoracic Echocardiogram:    EXAM:  2-D ECHO (TTE) COMPLETE        PROCEDURE DATE:  02/26/2020      INTERPRETATION:  REPORT:    TRANSTHORACIC ECHOCARDIOGRAM REPORT         Patient Name:   DONI PATRICK Accession #: [id number]  Medical Rec #:  [id number]     Height:      64.0 in 162.6 cm  YOB: 1945    Weight:      181.0 lb 82.10 kg  Patient Age:    75 years     BSA:         1.87 m²  Patient Gender: F            BP:          106/63 mmHg       Date of Exam:        2/26/2020 4:09:11 PM  Referring Physician: [id number] [facility]  Sonographer:         Peter Peterson  Reading Physician:   Alexander Alex M.D.    Procedure:   2D Echo/Doppler/Color Doppler Complete.  Indications: I26.99 - Other Pulmonary Embolism without Acute Cor Pulmonale  Diagnosis:   Other pulmonary embolism without acute cor pulmonale - I26.99         Summary:   1. LV Ejection Fraction by Caballero's Method with a biplane EF of 70 %.   2. Moderate concentric left ventricular hypertrophy.   3. Spectral Doppler shows impaired relaxation pattern of left ventricular myocardial filling (Grade I diastolic dysfunction).   4. Mild mitral valve regurgitation.   5. Sclerotic aortic valve with normal opening.   6. Estimated pulmonary artery systolic pressure is 36.2 mmHg assuming a right atrial pressure of 5 mmHg, which is consistent with borderline pulmonary hypertension.    PHYSICIAN INTERPRETATION:  Left Ventricle: The left ventricular internal cavity size is normal. There is moderate concentric left ventricular hypertrophy. Spectral Doppler shows impaired relaxation pattern of left ventricular myocardial filling (Grade I diastolic dysfunction).  Right Ventricle: Normal right ventricular size and function.  Left Atrium: Normal left atrial size.  Right Atrium: Normal right atrial size.  Pericardium: There is no evidence of pericardial effusion.  Mitral Valve: Structurally normal mitral valve, with normal leaflet excursion. The mitral valve is normal in structure. Mild mitral valve regurgitation is seen.  Tricuspid Valve: Structurally normal tricuspid valve, with normal leaflet excursion. The tricuspid valve is normal in structure. Mild tricuspid regurgitation is visualized. Estimated pulmonary artery systolic pressure is 36.2 mmHg assuming a right atrial pressure of 5 mmHg, which is consistent with borderline pulmonary hypertension.  Aortic Valve: The aortic valve is trileaflet. No evidence of aortic stenosis. Sclerotic aortic valve with normal opening. Trivial aortic valve regurgitation is seen.  Pulmonic Valve: Structurally normal pulmonic valve, with normal leaflet excursion. The pulmonic valve is normal. Trace pulmonic valve regurgitation.  Aorta: The aortic root and ascending aorta are structurally normal, with no evidence of dilitation.  Pulmonary Artery: The main pulmonary artery is normal in size.       2D AND M-MODE MEASUREMENTS (normal ranges within parentheses):  Left Ventricle:                  Normal   Aorta/Left Atrium:             Normal  IVSd (2D):              1.51 cm (0.7-1.1) AoV Cusp Separation: 1.74 cm (1.5-2.6)  LVPWd (2D):             1.51 cm (0.7-1.1) Left Atrium (Mmode): 2.63 cm (1.9-4.0)  LVIDd (2D):             3.22 cm (3.4-5.7) Right Ventricle:  LVIDs (2D):             1.90 cm           RVd (2D):        2.29 cm  LV FS (2D):             41.1%   (>25%)  Relative Wall Thickness  0.94    (<0.42)    SPECTRAL DOPPLER ANALYSIS:  LV DIASTOLIC FUNCTION:  MV Peak E: 0.60 m/s Decel Time: 229 msec  MV Peak A: 0.88 m/s  E/A Ratio: 0.68    Aortic Valve:  AoV VMax:    1.32 m/s AoV Area, Vmax: 2.41 cm² Vmax Indx: 1.29 cm²/m²  AoV Pk Grad: 7.0 mmHg    LVOT Vmax: 1.02 m/s  LVOT VTI:  0.20 m  LVOT Diam: 2.00 cm    Mitral Valve:  MV P1/2 Time: 66.49 msec  MV Area, PHT: 3.31 cm²    Tricuspid Valve and PA/RV Systolic Pressure: TR Max Velocity: 2.79 m/s RA Pressure: 5 mmHg RVSP/PASP: 36.2 mmHg       [id number] Alexander Alex M.D., Electronically signed on 2/26/2020 at 4:20:25 PM              *** Final ***                    ALEXANDER ALEX MD  This document has been electronically signed. Feb 26 2020  4:09PM             (02-26-20 @ 16:09)  12 Lead ECG:   Ventricular Rate 127 BPM    Atrial Rate 127 BPM    P-R Interval 128 ms    QRS Duration 80 ms    Q-T Interval 300 ms    QTC Calculation(Bezet) 436 ms    P Axis 41 degrees    R Axis 1 degrees    T Axis 32 degrees    Diagnosis Line Sinus tachycardia with Premature atrial complexes  Possible Left atrial enlargement  Nonspecific ST abnormality  Abnormal ECG    Confirmed by Kelsey MOLINA Mary ([id number]) on 2/24/2020 8:03:09 AM (02-21-20 @ 18:39)      MEDICATIONS  ALBUTerol    90 MICROgram(s) HFA Inhaler 4  calcitriol  Solution 0.25  calcium carbonate    500 mG (Tums) Chewable 3  chlorhexidine 0.12% Liquid 15  chlorhexidine 4% Liquid 1  cyanocobalamin 1000  dextrose 5%. 1000  fentaNYL   Infusion. 0.519  gabapentin 300  influenza   Vaccine 0.5  insulin lispro (HumaLOG) corrective regimen sliding scale   ipratropium 17 MICROgram(s) HFA Inhaler 4  meropenem  IVPB   meropenem  IVPB 1000  methylPREDNISolone sodium succinate Injectable 60  midazolam Infusion 0.02  multivitamin/minerals 1  mupirocin 2% Ointment 1  norepinephrine Infusion 0.05  pantoprazole   Suspension 40      WEIGHT  Weight (kg): 72.2 (02-26-20 @ 11:30)  Creatinine, Serum: 0.6 mg/dL (03-02-20 @ 04:35)      ANTIBIOTICS:  meropenem  IVPB      meropenem  IVPB 1000 milliGRAM(s) IV Intermittent every 8 hours      All available historical records have been reviewed

## 2020-03-02 NOTE — PROGRESS NOTE ADULT - ASSESSMENT
74y female with PMH of asthma, COPD not on home O2, autoimmune hepatitis on prednisone and tacrolimus, heterozygous prothrombin gene mutation with hx of PE and DVT on coumadin presents to the hospital complaining of cough, hemoptysis. Found to have viral pneumonia with respiratory failure and intubated. Extubated after prolonged course but required re-intubation 2/26 for worsening respiratory status and received IVC filter 2/26 because pt not safe for AC (alveolar hemorrhage).     #ARDS, alveolar hemorrhage, viral pneumonia, VAC pneumonia due to MRSA  - completed Zyvox, Levaquin, Cefepime course, and oseltamivir   - C/w Duoneb   - Solumedrol IV 60mg q12  - Bronch results - neg culture, neg fungal, cytology positive for inflammatory cells, no organisms  - Bronch 2/16 showed bleeding from DAH  - Bronch 2/26 for BAL, cultures show gram - rods, start meropenem 2/27--  - Condition is very serious and refractory; prognosis is poor as stated by multiple specialists    #Elevated lactate/tropnemia  - lactate , 1.7-->3.1-->2.6-->1.5 (2/27)-->2.6-->3.1 (2/28)- Ordered by staff  - on meropenem for positive bronchial culture  - tropnemia due to demand ischemia?  - f/u trop/lactate    #Candidemia, secondary to central line  - cultures neg since 2/14  - oral fluconazole 14 days, switched to caspofungin for rising LFT (2/26), completed 2/27  - ID following    #Rectal Bleeding   - GI following - none actively-status post flexible sig  - received 1U of blood 2/23, hemoglobin stable  - Protonix 40mg PO qd   - FlexSig showed 2 rectal ulcers one clean one crater, s/p clipping  - CBC q24- At least; would increase for any bleeding  - Avoid Dignshield or rectal tube   - s/p sigmoidoscopy recurrent rectal bleeding possibly from diverticulosis 2/28, received 2U pRBC,   - will need CT angio if bleeding again, f/u CBC    #Hypokalemia  -Given 40 mEq twice today, f/u repeat    #REJI oliguric likely ATN with Vanc toxicity   - Resolved  - Monitor BMP    # 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  - duplex shows DVT consistent with prior  - not candidate for AC at this time due to alveolar hemorrhage   - IVC filter placed 2/26    #Immunosuppressed: Autoimmune Hepatitis   - solumedrol 60 mg qd  - holding home prednisone 60 mg PO qd   - CMV PCR neg this admission   - f/u repeat tarcolimus level    #HTN  - Continue amlodipine 10 qd, Lopressor 50 BID    #Heterozygous prothrombin gene mutation with hx of PE and DVT on coumadin  - holding coumadin for now due to alveolar hemorrhage   - monitor coags    #0.5 cm of GB polyp  - needs f/u US in 12 months     #Hx of asthma  - C/w montelukast qd    #Deconditioning   -pt severely deconditioned due to prolonged hospital stay  - PT/Rehab     # GI PPx: Protonix 40 mg PO qd  # DVT PPx: IVC filter  # Code Status: FULL, palliative consult; Condition is very poor; /HCP has expressed possible wishes to go to less aggressive therapy.  .  Continue to try to review with him. 74y female with PMH of asthma, COPD not on home O2, autoimmune hepatitis on prednisone and tacrolimus, heterozygous prothrombin gene mutation with hx of PE and DVT on coumadin presents to the hospital complaining of cough, hemoptysis. Found to have viral pneumonia with respiratory failure and intubated. Extubated after prolonged course but required re-intubation 2/26 for worsening respiratory status and received IVC filter 2/26 because pt not safe for AC (alveolar hemorrhage).     #ARDS, alveolar hemorrhage, viral pneumonia, VAC pneumonia due to MRSA;  - then PE  - completed Zyvox, Levaquin, Cefepime course, and oseltamivir   - C/w Duoneb   - Solumedrol IV 60mg q12  - Bronch results - neg culture, neg fungal, cytology positive for inflammatory cells, no organisms  - Bronch 2/16 showed bleeding from DAH  - Bronch 2/26 for BAL, cultures show gram - rods, start meropenem 2/27--  - Condition is very serious and refractory; prognosis is poor as stated by multiple specialists  - ICU team now testing for possibility of extubation  - Prognosis remains very poor    #Elevated lactate/tropnemia  - lactate , 1.7-->3.1-->2.6-->1.5 (2/27)-->2.6-->3.1 (2/28)- Ordered by staff  - on meropenem for positive bronchial culture  - tropnemia due to demand ischemia?  - f/u trop/lactate    #Candidemia, secondary to central line  - cultures neg since 2/14  - oral fluconazole 14 days, switched to caspofungin for rising LFT (2/26), completed 2/27  - ID following    #Rectal Bleeding - Seems to have eased off  - GI following - none actively-status post flexible sig  - received 1U of blood 2/23, hemoglobin stable  - Protonix 40mg PO qd   - FlexSig showed 2 rectal ulcers one clean one crater, s/p clipping  - CBC q24- At least; would increase for any bleeding  - Avoid Dignshield or rectal tube   - s/p sigmoidoscopy recurrent rectal bleeding possibly from diverticulosis 2/28, received 2U pRBC,   - will need CT angio if bleeding again, f/u CBC    #Hypokalemia  -Given 40 mEq twice today, f/u repeat    #REJI oliguric likely ATN with Vanc toxicity   - Resolved  - Monitor BMP    # 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  - duplex shows DVT consistent with prior  - not candidate for AC at this time due to alveolar hemorrhage   - IVC filter placed 2/26    #Immunosuppressed: Autoimmune Hepatitis   - solumedrol 60 mg qd  - holding home prednisone 60 mg PO qd   - CMV PCR neg this admission   - f/u repeat tarcolimus level    #HTN  - Continue amlodipine 10 qd, Lopressor 50 BID    #Heterozygous prothrombin gene mutation with hx of PE and DVT on coumadin  - holding coumadin for now due to alveolar hemorrhage   - monitor coags    #0.5 cm of GB polyp  - needs f/u US in 12 months     #Hx of asthma  - C/w montelukast qd    #Deconditioning   -pt severely deconditioned due to prolonged hospital stay  - PT/Rehab     # GI PPx: Protonix 40 mg PO qd  # DVT PPx: IVC filter  # Code Status: FULL, palliative consult; Condition is very poor; /HCP has expressed possible wishes to go to less aggressive therapy.  .  Continue to try to review with him.

## 2020-03-02 NOTE — CHART NOTE - NSCHARTNOTEFT_GEN_A_CORE
Registered Dietitian Follow-Up    ***Scroll to the bottom for RD recommendation***    Patient Profile Reviewed                           Yes [x]   No []  Nutrition History Previously Obtained        Yes [x]  No []          PERTINENT SUBJECTIVE INFORMATION (LATEST AS OF TODAY):  - FAMILY at bedside.   - Pt continues to be intuabted to ventilator. IV fentanyl, versed. Ve 16.4, /94, . Temp 37.4c,   HgbA1c is 6.8, no noted Hx of DM2.   - Pt been NPO, but per RN, will restart Tube feed bolus either q8hr or q6hr depending on the insulin regimen.        PERTINENT MEDICAL INFORMATIONS:  (not much new)  (1) p/w SOB+ desat.   (2) Acute hypoxic resp/ ARDS   (3) DAH resolving. FLU A treated.   (4) REJI improving.   (5) Failing SLP. last FEES was 2/25/20.   (6) F/u Renal lytes. ID consulted.   (7) Candidemia caspofungin. f/u electrolytes.           DIET ORDER:  NPO as 3/2.        ANTHROPOMETRICS:  - Ht.  162.6cm  - Wt.   (2/1): 74.8kg   (2/2): 74.3kg  (2/3): 81.5kg  (2/4): 78.4kg  (2/8): 87.7kg  (2/10): 86.7kg  (2/16): 86kg   (2/19): 87.9kg  (2/21): 81.5kg  (2/24): 74.5kg  (2/27): 71.3kg  (2/26): 72.2kg - pt has edema ongoing, and weight appears to be trending downward. It is possible that pt is losing weight. will monitor  - BMI. 28- 30.8  - IBW. 54.5kg       PERTINENT LAB DATA:  3/2: h/h 8.1/24.4, BUN 37, Cr 0.6, glucose 184, ca 7.0, albumin 1.7  PERTINENT MEDS:   abx, d5w, fentanyl, versed, acetaminophen, b12, MVI      PHYSICAL FINDINGS  - APPEARANCE:        intubated to ventilator, 3+ generalized edema, 4+ L foot edema  - GI FUNCTION:       last noted BM was 2/26, a small one noted by RN 3/1  - TUBES:                     NGT  - ORAL/MOUTH:      NPO, failed SLP 2/24  - SKIN:                        ecchymosis        NUTRITION REQUIREMENTS  WEIGHT USED:                          Ht: 162.6cm, Wt: 74.8kg (more likely pt's wt PTA, BMI: 28.5)  ESTIMATED ENERGY NEEDS:       CONTINUE [  ]      ADJUST [ x ]  - intubated since 2/27    ESTIMATED ENERGY NEEDS:         1664 kcal/day ([id number])  -- was 1235   ESTIMATED PROTEIN NEEDS:         g/day (1.2-1.4 g/kg of ABW) - improved renal status, aim higher, will monitor  ESTIMATED FLUID NEEDS:             fluid per ICU team    CURRENT NUTRIENT NEEDS:    NPO as of 3/2          [  x] PREVIOUS NUTRITION DIAGNOSIS:    (1) Inadequate energy intake  - ONGOING            [x  ] ONGOING        [  ] RESOLVED            PATIENT INTERVENTION:    [  ] ORAL        [ x] EN/TF     GOAL/EXPECTED OUTCOME:     pt to meet and tolerate >85% of estimated kcal/protein via TF upon f/u in 3 days.   INDICATOR/MONITORING:       RD to monitor diet order, energy intake, body composition, nutrition focused physical findings (EN tolerance, s/s, renal profile)  NUTRITION INTERVENTION:        Enteral nutrition      RECS: (1) Pt is not re-intubated and since patient has been tolerating Osmolite 1.5 previously, will continue Osmolite 1.5 for now. Dietitian Recommend to start OSMOLITE 1.5 at 240ml q6hr, with No-Carb prosource q8hr (total 3 packs/day). This regimen at GOAL gives a total of 1540 kcal/ 93g protein/ 730mL free water, additional flushes per ICU team. (2) Insulin bolus q6hr as needed. HgbA1c 6.8.

## 2020-03-03 NOTE — PROGRESS NOTE ADULT - SUBJECTIVE AND OBJECTIVE BOX
Patient is a 75y old  Female who presents with a chief complaint of SOB and desaturation (02 Mar 2020 11:09)        Over Night Events:  Remains critically ill on MV.  Off pressors.  Sedated.  Agitated off sedation         ROS:     CONSTITUTIONAL:   no fever   no chills.  no weight gain   no weight loss    EYES:   no discharge,   no pain  no redness,   no visual changes.    ENT:   Ears: no ear pain and no hearing problems.  Nose: no nasal congestion and no nasal drainage.  Mouth/Throat: no dysphagia,  no hoarseness and no throat pain.  Neck: no lumps, no pain, no stiffness and no swollen glands.     CARDIOVASCULAR:   no chest pain,   no swelling  no palpitaions  no syncope    RESPIRATORY:  Per HPI     GASTROINTESTINAL:   no abdominal pain,   no constipation,   no diarrhea,   no vomiting.    GENITOURINARY:  no dysuria,   no frequency,   no urgency  no hematuria.    MUSCULOSKELETAL:   no back pain,   no musculoskeletal pain,  no weakness.    SKIN:   no jaundice,   no lesions,   no pruritis,   no rashes.    NEURO:   no loss of consciousness,   no gait abnormality,   no headache,   no sensory deficits,   no weakness.    PSYCHIATRIC:   no known mental health issues  no anxiety  no depression    ALLERGIC/IMMUNOLOGIC:   No active allergic or immunologic issues        PHYSICAL EXAM    ICU Vital Signs Last 24 Hrs  T(C): 36.8 (03 Mar 2020 08:00), Max: 36.8 (03 Mar 2020 00:00)  T(F): 98.2 (03 Mar 2020 08:00), Max: 98.2 (03 Mar 2020 00:00)  HR: 86 (03 Mar 2020 07:30) (64 - 146)  BP: 152/80 (03 Mar 2020 07:30) (79/53 - 211/108)  BP(mean): 112 (03 Mar 2020 07:30) (54 - 146)  ABP: --  ABP(mean): --  RR: 26 (03 Mar 2020 07:30) (12 - 35)  SpO2: 100% (03 Mar 2020 07:30) (91% - 100%)      CONSTITUTIONAL:   Ill appearing.  Well nourished.  NAD    ENT:   Airway patent,   Mouth with normal mucosa.   No thrush    EYES:   Pupils equal,   Round and reactive to light.    CARDIAC:   Tachycardic   Regular rhythm.     edema      Vascular:  Normal systolic impulse  No Carotid bruits    RESPIRATORY:   No wheezing  Bilateral BS  Normal chest expansion  Not tachypneic,  No use of accessory muscles    GASTROINTESTINAL:  Abdomen soft,   Non-tender,   No guarding,   + BS    GENITOURINARY  normal genitalia for sex   edema    MUSCULOSKELETAL:   Range of motion is not limited,  No muscle or joint tenderness  No clubbing, cyanosis    NEUROLOGICAL:   Sedated      SKIN:   Skin normal color for race,   Warm and dry and intact.   No evidence of rash.    PSYCHIATRIC:   Normal mood and affect.   No apparent risk to self or others.    HEME LYMPH:   No cervical  lymphadenopathy.  no inguinal lymphadenopathy      03-02-20 @ 07:01  -  03-03-20 @ 07:00  --------------------------------------------------------  IN:    Enteral Tube Flush: 500 mL    fentaNYL Infusion.: 594.8 mL    Free Water: 250 mL    midazolam Infusion: 2.1 mL    Osmolite: 500 mL  Total IN: 1846.9 mL    OUT:    Indwelling Catheter - Urethral: 150 mL    Ureteral Catheter: 1665 mL  Total OUT: 1815 mL    Total NET: 31.9 mL      03-03-20 @ 07:01  -  03-03-20 @ 08:09  --------------------------------------------------------  IN:    fentaNYL Infusion.: 21.7 mL  Total IN: 21.7 mL    OUT:    Ureteral Catheter: 50 mL  Total OUT: 50 mL    Total NET: -28.3 mL          LABS:                            10.0   4.57  )-----------( 53       ( 03 Mar 2020 05:20 )             29.6                                               03-03    138  |  99  |  33<H>  ----------------------------<  299<H>  4.0   |  24  |  0.5<L>    Ca    7.3<L>      03 Mar 2020 05:20  Mg     1.4     03-03    TPro  5.1<L>  /  Alb  2.1<L>  /  TBili  1.7<H>  /  DBili  x   /  AST  62<H>  /  ALT  66<H>  /  AlkPhos  240<H>  03-03                                                                                           LIVER FUNCTIONS - ( 03 Mar 2020 05:20 )  Alb: 2.1 g/dL / Pro: 5.1 g/dL / ALK PHOS: 240 U/L / ALT: 66 U/L / AST: 62 U/L / GGT: x                                                                                               Mode: AC/ CMV (Assist Control/ Continuous Mandatory Ventilation)  RR (machine): 16  TV (machine): 450  FiO2: 70  PEEP: 5  ITime: 1  MAP: 12  PIP: 32                                      ABG - ( 03 Mar 2020 03:47 )  pH, Arterial: 7.36  pH, Blood: x     /  pCO2: 46    /  pO2: 66    / HCO3: 26    / Base Excess: -0.2  /  SaO2: 92                  MEDICATIONS  (STANDING):  ALBUTerol    90 MICROgram(s) HFA Inhaler 4 Puff(s) Inhalation every 6 hours  calcitriol  Solution 0.25 MICROGram(s) Oral daily  calcium carbonate    500 mG (Tums) Chewable 3 Tablet(s) Chew every 8 hours  chlorhexidine 0.12% Liquid 15 milliLiter(s) Oral Mucosa two times a day  chlorhexidine 4% Liquid 1 Application(s) Topical <User Schedule>  cyanocobalamin 1000 MICROGram(s) Oral daily  dextrose 5%. 1000 milliLiter(s) (50 mL/Hr) IV Continuous <Continuous>  fentaNYL   Infusion. 0.519 MICROgram(s)/kG/Hr (3.75 mL/Hr) IV Continuous <Continuous>  gabapentin 300 milliGRAM(s) Oral at bedtime  influenza   Vaccine 0.5 milliLiter(s) IntraMuscular once  insulin lispro (HumaLOG) corrective regimen sliding scale   SubCutaneous every 6 hours  ipratropium 17 MICROgram(s) HFA Inhaler 4 Puff(s) Inhalation every 6 hours  meropenem  IVPB      meropenem  IVPB 1000 milliGRAM(s) IV Intermittent every 8 hours  methylPREDNISolone sodium succinate Injectable 60 milliGRAM(s) IV Push daily  midazolam Infusion 0.02 mG/kG/Hr (1.444 mL/Hr) IV Continuous <Continuous>  multivitamin/minerals 1 Tablet(s) Oral daily  mupirocin 2% Ointment 1 Application(s) Topical two times a day  pantoprazole   Suspension 40 milliGRAM(s) Oral before breakfast    MEDICATIONS  (PRN):  acetaminophen   Tablet .. 650 milliGRAM(s) Oral every 6 hours PRN Temp greater or equal to 38C (100.4F)  glucagon  Injectable 1 milliGRAM(s) IntraMuscular once PRN Glucose LESS THAN 70 milligrams/deciliter      New X-rays reviewed:                                                                                  ECHO    CXR interpreted by me:  ET OG TLC OK>  bilateral infiltrates

## 2020-03-03 NOTE — PROGRESS NOTE ADULT - SUBJECTIVE AND OBJECTIVE BOX
74y female with PMH of asthma, COPD not on home O2, autoimmune hepatitis on prednisone and tacrolimus, heterozygous prothrombin gene mutation with hx of PE and DVT on coumadin presents to the hospital complaining of cough, hemoptysis. Found to have viral pneumonia with respiratory failure and intubated. Extubated after prolonged course but required re-intubation 2/26 for worsening respiratory status and received IVC filter 2/26 because pt not safe for AC (alveolar hemorrhage).     Palliative meeting pending with family    #) Diffuse Alveoloar hemorrhage  - completed Zyvox, Levaquin, Cefepime course, and oseltamivir   - C/w Duoneb   - Solumedrol IV 60mg q12  - Bronch results - neg culture, neg fungal, cytology positive for inflammatory cells, no organisms  - Bronch 2/16 showed bleeding from DAH  - Bronch 2/26 for BAL, cultures show gram - rods, start meropenem 2/27    #) Tachycardia  - start cardizem 30 mg q8    #) Candidemia, secondary to central line  - cultures neg since 2/14  - oral fluconazole 14 days, switched to caspofungin for rising LFT (2/26), completed 2/27  - ID following    #) Rectal Bleeding   - GI following   - received 1U of blood 2/23, hemoglobin stable  - Protonix 40mg PO qd   - FlexSig showed 2 rectal ulcers one clean one crater, s/p clipping  - CBC q24  - Avoid Dignshield or rectal tube   - s/p sigmoidoscopy recurrent rectal bleeding possibly from diverticulosis 2/28, received 2U pRBC,   - will need CT angio if bleeding again, f/u CBC    #) 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  - duplex shows DVT consistent with prior  - not candidate for AC at this time due to alveolar hemorrhage   - IVC filter placed 2/26    #Immunosuppressed: Autoimmune Hepatitis   - solumedrol 60 mg qd  - holding home prednisone 60 mg PO qd   - CMV PCR neg this admission   - f/u repeat tarcolimus level    #HTN  - Continue amlodipine 10 qd, Lopressor 50 BID    #Heterozygous prothrombin gene mutation with hx of PE and DVT on coumadin  - holding coumadin for now due to alveolar hemorrhage   - monitor coags    #0.5 cm of GB polyp  - repeat US abdomen in 6 months per GI    #Hx of asthma  - C/w montelukast qd    #Deconditioning   - pt severely deconditioned due to prolonged hospital stay  - PT/Rehab     #) MISC  Activity: intubated  DVT ppx: IVC filter  GI ppx: protonix  Diet: tube feeding  Code: Full  Dispo: medical optimization  CHG

## 2020-03-03 NOTE — PROGRESS NOTE ADULT - ASSESSMENT
75 y/o female with pmhx of asthma, HTN,  autoimmune hepatitis, heterozygous prothrombin gene mutation with hx of PE and DVT on coumadin admitted for SOB     Acute hypoxic resp failure with hypoxia due to influenza, HCAP  Septic shock in immunocompromised patient, Severe sepsis present on admission   - completed course of treatment- antibiotics and antiviral completed for initial infection  - MRSA VAP treated with Zyvox for 10 days   - remains intubated and sedated  - PE, HCAP pneumonia, GNR on BAL : Klebsiella   - GFF placed due to PE   - remains on IV Solu-medrol  - ID f/u noted, remains on Meropenem  - continue Albuterol    Multiple Skin tears/ wounds  - local care  - elevate extremities  - await burn evaluation    Candedemia  - central line change noted  - cultures negative x2  - 2D echo no evidence of vegetation   - ophth evaluation noted, no clinical evidence, low suspicion for ocular fungemia/endogenous endophthalmitis   - completed treatment  2/27    REJI on CKD 3  - creatinine normal  - renal f/u noted  - urine output noted  - continue to monitor  - follow renal recommendations    Acute Blood Loss Anemia  - had rectal tube earlier on this adm which was discontinued due to ulcer  - on Protonix  - has had multiple PRBC's this adm    Autoimmune hepatitis  - On prednisone 60 mg PO qd and Tacrolimus at home  - now on Solu-medrol  - Tacrolimus held    HTN by hx  - now off Labetolol    Heterozygous prothrombin gene mutation with hx of PE and DVT on coumadin:  - Coumadin had been held for alveolar hemorrhage     Protein Malnutrition  - Alb noted, trending back up  - tolerating enteral feedings    Diet: Enteral feedings   GI PPx: Protonix  DVT PPx: sequentials  Activity: bedrest  Dispo: readmitted to hospital from  rehab at [facility], remains ICU      Continue supportive measures, patient remains acutely ill,  Overall prognosis poor.

## 2020-03-03 NOTE — CHART NOTE - NSCHARTNOTEFT_GEN_A_CORE
Case discussed Dr. Lebron and Dr. Price.   Primary team discussed with family; plan is to continue current level of care with possible trach.   Palliative Care will sign off.

## 2020-03-03 NOTE — PROGRESS NOTE ADULT - ASSESSMENT
ASSESSMENT  75y/o F w/ hx of asthma, COPD not on home O2, autoimmune hepatitis on prednisone and tacrolimus, heterozygous prothrombin gene mutation with hx of PE and DVT on coumadin with recent hospitalization in January 2020 with a diagnosis of pneumonia presents for worsening SOB, hemoptysis and cough.    IMPRESSION  #GNR PNA- BAL 2/26 with   Numerous Klebsiella pneumoniae (panS)    Fungal cx from bronch -   Rare Yeast likely colonization    Acute hypoxemia, intubated 2/26, possible PE given +DVTs s/p Option Elite IVC filter via Right IJ access.  #1/16 Bronch cx AFB + 1/2 specimens    Possible MAC, given CT findings and +bronch specimen, would qualify for treatment per guidelines  #GIB  #Thrombocytopenia    continues post stopping linezolid   #Hx recent MRSA VAP    2/14 tracheal cx   Moderate Methicillin resistant Staphylococcus aureus    completed 8 day course, noted to have thrombocytopenia on linezolid  #Candidemia Fungitell: >500 (02-11-20 @ 11:21) likely CLABSI    2/11 BCX +, 2/12 BCX +, 2/13 BCX (-) RIJ 2/11. Groin a-line 2/4- removed 2/13     Fungitell: <31 (02-04-20 @ 04:40), Fungitell: 49 (02-01-20 @ 11:17)    Ophtho- no endophthalmitis   #Flu + AH1, Alveolar hemorrhage, ?superimposed atypical PNA (imaging not really consistent with bacterial PNA). Recent bronch 1/20 negative for PCP, Fungal, etc, previous bronch cx with yeast (likely oral contaminant) since GMS neg    2/3 Bronch   No organisms seen, 2/3 cytopath Rare atypical cells, few ciliated bronchial cells, alveolar macrophages and inflammatory cells, mainly neutrophils.    Strep urine Ag neg, serum crypto Ag neg, CMV PCR undetectable, AFB neg    Quantiferon indeterminate    Lactate Dehydrogenase, Serum: 607 (02.03.20 @ 04:30)    Procalcitonin, Serum: 1.86:    CTA chest showing no PE but showing interval development of multifocal bilateral groundglass opacities as well as new moderate to severe bronchiectasis in the right lung,   1/13 CT b/l GGOs, compatible with intersitial edema      During last hospitalization: bronch cx bacterial NG, +yeast, no ID, neg legionella, + MRSA PCR    MRSA PCR Result.: Positive (02-01-20 @ 20:40)  #Severe sepsis on admission P>90, WBC 19, RR>20, lactic acidosis Lactate, Blood: 2.9 mmol/L (01-31-20 @ 09:25)    afebrile   #Elevated Alk ph, transaminitis  #LLE wound, not infection     12/7 WCX MSSA, Kleb, bacteroides    8/2019 MRSA in a wound   #Immunosuppressed: autoimmune hepatitis on prednisone and tacrolimus    RECOMMENDATIONS  - Kleb in bronch is panS, no hx MDRO, recommend narrowing shyann to zosyn 3.375 q6h IV  (D6 on abx 2/27-)   - f/u AFB from 1/15 ID, will discuss with Pulm risk/benefit of therapy (possible MAC vs other NTM)   - Repeat fungitell (yeast in bronch likely colonizer)   - grave prognosis    Spectra [id number]

## 2020-03-03 NOTE — PROGRESS NOTE ADULT - SUBJECTIVE AND OBJECTIVE BOX
ELDER, DONI  75y  Female  HPI:  75y/o F w/ hx of asthma, COPD not on home O2, autoimmune hepatitis on prednisone and tacrolimus, heterozygous prothrombin gene mutation with hx of PE and DVT on coumadin with recent hospitalization in January 2020 with a diagnosis of pneumonia presents for worsening SOB, hemoptysis and cough. Everything started yesterday night when patient was in bed, started to feel shortness of breath and had many episodes of hemoptysis with clots, she also had substernal chest pain non radiating, moderate in intensity that worsens on coughing. She denies fever but endorses chills. She also admits for lightheadedness that occurred yesterday, no HA, abd pain, dysuria or paresthesias. She had 2 loose bowel movements since yesterday with some blood in the stools that she attributes to her hemorrhoids. She stopped Coumadin couple of days ago since her INR was supratherapeutic. She is from [facility] short term and also mentions that her  and another family member have the flu and she was exposed to them. She did not have the flu shot this season.  In ED, temp was 100.1 rectally, tachycardic ( sinus) , she was saturating to 70s on non rebreather and her SaO2 went up to 95%. ABG showing respiratory alkalosis initially and then corrected after BIPAP placement and correction of RR.  CTA chest showing no PE but showing interval development of multifocal bilateral groundglass opacities as well as new moderate to severe bronchiectasis in the right lung. Findings are concerning for an acute infectious/inflammatory process. (31 Jan 2020 14:36)    MEDICATIONS  (STANDING):  ALBUTerol    90 MICROgram(s) HFA Inhaler 4 Puff(s) Inhalation every 6 hours  calcitriol  Solution 0.25 MICROGram(s) Oral daily  calcium carbonate    500 mG (Tums) Chewable 3 Tablet(s) Chew every 8 hours  chlorhexidine 0.12% Liquid 15 milliLiter(s) Oral Mucosa two times a day  chlorhexidine 4% Liquid 1 Application(s) Topical <User Schedule>  cyanocobalamin 1000 MICROGram(s) Oral daily  dexMEDEtomidine Infusion 0.2 MICROgram(s)/kG/Hr (3.61 mL/Hr) IV Continuous <Continuous>  dextrose 5%. 1000 milliLiter(s) (50 mL/Hr) IV Continuous <Continuous>  diltiazem    Tablet 30 milliGRAM(s) Oral every 8 hours  fentaNYL   Infusion. 0.519 MICROgram(s)/kG/Hr (3.75 mL/Hr) IV Continuous <Continuous>  gabapentin 300 milliGRAM(s) Oral at bedtime  influenza   Vaccine 0.5 milliLiter(s) IntraMuscular once  insulin regular Infusion 1 Unit(s)/Hr (1 mL/Hr) IV Continuous <Continuous>  ipratropium 17 MICROgram(s) HFA Inhaler 4 Puff(s) Inhalation every 6 hours  meropenem  IVPB      meropenem  IVPB 1000 milliGRAM(s) IV Intermittent every 8 hours  methylPREDNISolone sodium succinate Injectable 60 milliGRAM(s) IV Push daily  midazolam Infusion 0.02 mG/kG/Hr (1.444 mL/Hr) IV Continuous <Continuous>  multivitamin/minerals 1 Tablet(s) Oral daily  mupirocin 2% Ointment 1 Application(s) Topical two times a day  pantoprazole   Suspension 40 milliGRAM(s) Oral before breakfast    MEDICATIONS  (PRN):  acetaminophen   Tablet .. 650 milliGRAM(s) Oral every 6 hours PRN Temp greater or equal to 38C (100.4F)  glucagon  Injectable 1 milliGRAM(s) IntraMuscular once PRN Glucose LESS THAN 70 milligrams/deciliter    INTERVAL EVENTS: Patient seen today remains vented on FIO2 60%, sedated.     T(C): 36.3 (03-03-20 @ 18:00), Max: 36.8 (03-03-20 @ 00:00)  HR: 84 (03-03-20 @ 20:00) (66 - 146)  BP: 93/55 (03-03-20 @ 20:00) (79/53 - 211/108)  RR: 15 (03-03-20 @ 20:00) (14 - 39)  SpO2: 98% (03-03-20 @ 20:00) (91% - 100%)  Wt(kg): --Vital Signs Last 24 Hrs  T(C): 36.3 (03 Mar 2020 18:00), Max: 36.8 (03 Mar 2020 00:00)  T(F): 97.4 (03 Mar 2020 18:00), Max: 98.2 (03 Mar 2020 00:00)  HR: 84 (03 Mar 2020 20:00) (66 - 146)  BP: 93/55 (03 Mar 2020 20:00) (79/53 - 211/108)  BP(mean): 76 (03 Mar 2020 20:00) (54 - 146)  RR: 15 (03 Mar 2020 20:00) (14 - 39)  SpO2: 98% (03 Mar 2020 20:00) (91% - 100%)    PHYSICAL EXAM:  GENERAL: Vented, Sedated  NECK: Supple, No JVD  CHEST/LUNG: Rhonchi  HEART: S1, S2, Tachycardic  ABDOMEN: Soft, Nontender,  Bowel sounds present  EXTREMITIES: Edema, Bruised, Multiple skin tears  SKIN: Multiple skin lesions    LABS:                        10.0   4.57  )-----------( 53       ( 03 Mar 2020 05:20 )             29.6             03-03    138  |  99  |  33<H>  ----------------------------<  299<H>  4.0   |  24  |  0.5<L>    Ca    7.3<L>      03 Mar 2020 05:20  Mg     1.4     03-03    TPro  5.1<L>  /  Alb  2.1<L>  /  TBili  1.7<H>  /  DBili  x   /  AST  62<H>  /  ALT  66<H>  /  AlkPhos  240<H>  03-03    LIVER FUNCTIONS - ( 03 Mar 2020 05:20 )  Alb: 2.1 g/dL / Pro: 5.1 g/dL / ALK PHOS: 240 U/L / ALT: 66 U/L / AST: 62 U/L / GGT: x             ABG - ( 03 Mar 2020 08:15 )  pH, Arterial: 7.41  pH, Blood: x     /  pCO2: 45    /  pO2: 122   / HCO3: 29    / Base Excess: 3.5   /  SaO2: 98          Culture - Acid Fast - Bronchial w/Smear (02.26.20 @ 14:45)    Specimen Source: .Bronchial None    Acid Fast Bacilli Smear:   No acid fast bacilli seen by fluorochrome stain    Culture Results:   Culture is being performed.      Culture - Fungal, Bronchial (02.26.20 @ 14:45)    Specimen Source: .Bronchial None    Culture Results:   Rare Yeast        Culture - Bronchial (02.26.20 @ 14:45)    -  Amoxicillin/Clavulanic Acid: S <=8/4    -  Amikacin: S <=16    Gram Stain:   Moderate polymorphonuclear leukocytes seen per low power field  Rare Squamous epithelial cells seen per low power field  Numerous Gram Negative Rods seen per oil power field    -  Ciprofloxacin: S <=1    -  Aztreonam: S <=4    -  Cefepime: S <=2    -  Trimethoprim/Sulfamethoxazole: S <=2/38    -  Ertapenem: S <=0.5    -  Gentamicin: S <=2    -  Imipenem: S <=1    -  Levofloxacin: S <=2    -  Meropenem: S <=1    -  Piperacillin/Tazobactam: S <=8    -  Tobramycin: S <=2    -  Ampicillin: R >16 These ampicillin results predict results for amoxicillin    -  Cefazolin: S <=2 Enterobacter, Citrobacter, and Serratia may develop resistance during prolonged therapy (3-4 days)    -  Cefoxitin: S <=8    -  Ampicillin/Sulbactam: S 8/4 Enterobacter, Citrobacter, and Serratia may develop resistance during prolonged therapy (3-4 days)    -  Ceftriaxone: S <=1 Enterobacter, Citrobacter, and Serratia may develop resistance during prolonged therapy    Specimen Source: .Bronchial None    Culture Results:   Numerous Klebsiella pneumoniae  Normal Respiratory Nikki present    Organism Identification: Klebsiella pneumoniae    Organism: Klebsiella pneumoniae    Method Type: EROS      RADIOLOGY & ADDITIONAL TESTS:  < from: Xray Chest 1 View- PORTABLE-Routine (03.03.20 @ 05:04) >  Findings:    Support devices: Stable right IJ central venous catheter and enteric tube. Endotracheal tube tip, approximately 2.6 cm from baljinder.    Cardiac/mediastinum/hilum: Limited evaluation, heart borders are obscured.    Lungparenchyma/Pleura: Unchanged diffuse bilateral opacities. No pneumothorax.    Skeleton/soft tissues: Unchanged.    Impression:      Unchanged diffuse bilateral opacities. No pneumothorax.    Support tubes as per above.        < end of copied text >

## 2020-03-03 NOTE — PROGRESS NOTE ADULT - SUBJECTIVE AND OBJECTIVE BOX
DARNELL, DONI  75y, Female  Allergy: No Known Allergies      LOS  32d    CHIEF COMPLAINT: SOB and desaturation (03 Mar 2020 08:08)      INTERVAL EVENTS/HPI  - No acute events overnight, 60% FIO2  - 1/16 Bronch cx AFB + 1/2 specimens  - T(F): , Max: 98.2 (03-03-20 @ 00:00)  - WBC Count: 4.57 (03-03-20 @ 05:20)  WBC Count: 3.20 (03-02-20 @ 04:35)  - Creatinine, Serum: 0.5 (03-03-20 @ 05:20)  Creatinine, Serum: 0.6 (03-02-20 @ 04:35)       ROS  unable to obtain history secondary to patient's mental status and/or sedation     VITALS:  T(F): 98.2, Max: 98.2 (03-03-20 @ 00:00)  HR: 88  BP: 152/80  RR: 26Vital Signs Last 24 Hrs  T(C): 36.8 (03 Mar 2020 08:00), Max: 36.8 (03 Mar 2020 00:00)  T(F): 98.2 (03 Mar 2020 08:00), Max: 98.2 (03 Mar 2020 00:00)  HR: 88 (03 Mar 2020 08:18) (64 - 146)  BP: 152/80 (03 Mar 2020 07:30) (79/53 - 211/108)  BP(mean): 112 (03 Mar 2020 07:30) (54 - 146)  RR: 26 (03 Mar 2020 07:30) (12 - 35)  SpO2: 100% (03 Mar 2020 08:18) (91% - 100%)    PHYSICAL EXAM:  Gen: intubated  HEENT: Normocephalic, atraumatic  Neck: supple, no lymphadenopathy  CV: Regular rate & regular rhythm  Lungs: decreased BS at bases, no fremitus  Abdomen: Soft, BS present  Ext: Warm, well perfused, anasarca  Neuro: sedated  Skin: no rash, no erythema, multiple ecchymoses  Lines: no phlebitis, IJ, midline    FH: Non-contributory  Social Hx: Non-contributory    TESTS & MEASUREMENTS:                        10.0   4.57  )-----------( 53       ( 03 Mar 2020 05:20 )             29.6     03-03    138  |  99  |  33<H>  ----------------------------<  299<H>  4.0   |  24  |  0.5<L>    Ca    7.3<L>      03 Mar 2020 05:20  Mg     1.4     03-03    TPro  5.1<L>  /  Alb  2.1<L>  /  TBili  1.7<H>  /  DBili  x   /  AST  62<H>  /  ALT  66<H>  /  AlkPhos  240<H>  03-03    eGFR if Non African American: 95 mL/min/1.73M2 (03-03-20 @ 05:20)  eGFR if African American: 110 mL/min/1.73M2 (03-03-20 @ 05:20)    LIVER FUNCTIONS - ( 03 Mar 2020 05:20 )  Alb: 2.1 g/dL / Pro: 5.1 g/dL / ALK PHOS: 240 U/L / ALT: 66 U/L / AST: 62 U/L / GGT: x               Culture - Acid Fast - Bronchial w/Smear (collected 02-26-20 @ 14:45)  Source: .Bronchial None  Preliminary Report (02-29-20 @ 15:04):    Culture is being performed.    Culture - Fungal, Bronchial (collected 02-26-20 @ 14:45)  Source: .Bronchial None  Preliminary Report (03-02-20 @ 08:54):    Rare Yeast    Culture - Bronchial (collected 02-26-20 @ 14:45)  Source: .Bronchial None  Gram Stain (02-27-20 @ 07:36):    Moderate polymorphonuclear leukocytes seen per low power field    Rare Squamous epithelial cells seen per low power field    Numerous Gram Negative Rods seen per oil power field  Final Report (02-28-20 @ 21:13):    Numerous Klebsiella pneumoniae    Normal Respiratory Nikki present  Organism: Klebsiella pneumoniae (02-28-20 @ 21:13)  Organism: Klebsiella pneumoniae (02-28-20 @ 21:13)      -  Amikacin: S <=16      -  Amoxicillin/Clavulanic Acid: S <=8/4      -  Ampicillin: R >16 These ampicillin results predict results for amoxicillin      -  Ampicillin/Sulbactam: S 8/4 Enterobacter, Citrobacter, and Serratia may develop resistance during prolonged therapy (3-4 days)      -  Aztreonam: S <=4      -  Cefazolin: S <=2 Enterobacter, Citrobacter, and Serratia may develop resistance during prolonged therapy (3-4 days)      -  Cefepime: S <=2      -  Cefoxitin: S <=8      -  Ceftriaxone: S <=1 Enterobacter, Citrobacter, and Serratia may develop resistance during prolonged therapy      -  Ciprofloxacin: S <=1      -  Ertapenem: S <=0.5      -  Gentamicin: S <=2      -  Imipenem: S <=1      -  Levofloxacin: S <=2      -  Meropenem: S <=1      -  Piperacillin/Tazobactam: S <=8      -  Tobramycin: S <=2      -  Trimethoprim/Sulfamethoxazole: S <=2/38      Method Type: EROS    Culture - Blood (collected 02-17-20 @ 05:17)  Source: .Blood None  Final Report (02-22-20 @ 14:00):    No growth at 5 days.    Culture - Blood (collected 02-15-20 @ 04:57)  Source: .Blood None  Final Report (02-20-20 @ 18:01):    No growth at 5 days.    Culture - Sputum (collected 02-14-20 @ 17:00)  Source: .Sputum Sputum  Gram Stain (02-15-20 @ 04:56):    Few Squamous epithelial cells per low power field    Few polymorphonuclear leukocytes per low power field    Few Yeast like cells per oil power field    Few Gram variable coccobacilli per oil power field  Final Report (02-16-20 @ 17:44):    Moderate Methicillin resistant Staphylococcus aureus    Normal Respiratory Nikki present  Organism: Methicillin resistant Staphylococcus aureus (02-16-20 @ 17:44)  Organism: Methicillin resistant Staphylococcus aureus (02-16-20 @ 17:44)      -  Ampicillin/Sulbactam: R <=8/4      -  Cefazolin: R <=4      -  Clindamycin: S <=0.25      -  Erythromycin: R >4      -  Gentamicin: S <=1 Should not be used as monotherapy      -  Linezolid: S 2      -  Oxacillin: R >2      -  Penicillin: R >8      -  RIF- Rifampin: S <=1 Should not be used as monotherapy      -  Tetra/Doxy: S <=1      -  Trimethoprim/Sulfamethoxazole: S <=0.5/9.5      -  Vancomycin: S 2      Method Type: EROS    Culture - Blood (collected 02-14-20 @ 04:30)  Source: .Blood Blood-Peripheral  Final Report (02-19-20 @ 14:00):    No growth at 5 days.    Culture - Other (collected 02-13-20 @ 18:55)  Source: .Other wound  Final Report (02-15-20 @ 19:22):    No growth    Culture - Blood (collected 02-12-20 @ 04:50)  Source: .Blood None  Gram Stain (02-13-20 @ 14:24):    Growth in aerobic bottle: Yeast like cells  Final Report (02-18-20 @ 15:03):    Growth in aerobic bottle: Candida albicans    See previous culture [id number]    Culture - Blood (collected 02-11-20 @ 11:21)  Source: .Blood None  Gram Stain (02-12-20 @ 16:48):    Growth in aerobic bottle: Yeast like cells  Final Report (02-15-20 @ 14:05):    Growth in aerobic bottle: Candida albicans    ***Blood Panel PCR results on this specimen are available    approximately 3 hours after the Gram stain result.***    Gram stain, PCR, and/or culture results may not always    correspond due to difference in methodologies.    ************************************************************    This PCR assay was performed using [employer].    The following targets are tested for: Enterococcus,    vancomycin resistant enterococci, Listeria monocytogenes,    coagulase negative staphylococci, S. aureus,    methicillin resistant S. aureus, Streptococcus agalactiae    (Group B), S. pneumoniae, S. pyogenes (Group A),    Acinetobacter baumannii, Enterobacter cloacae, E. coli,    Klebsiella oxytoca, K. pneumoniae, Proteus sp.,    Serratia marcescens, Haemophilus influenzae,    Neisseria meningitidis, Pseudomonas aeruginosa, Candida    albicans, C. glabrata, C krusei, C parapsilosis,    C. tropicalis and the KPC resistance gene.  Organism: Blood Culture PCR  Candida albicans (02-15-20 @ 14:05)  Organism: Candida albicans (02-15-20 @ 14:05)      -  Fluconazole: S 0.25 Fluconazole = Data established for mucosal disease, and is generally applicable for invasive disease.  Susceptibility is dependent on achieving the maximal possible blood level.  Doses of 400 MG/day or more may be required in adults with normal renalfunction and body habitus.      -  Interpretation: See note This test method is not approved by the FDA and is for research use only.  The performance characteristics of this assay may have been determined by [facility] and [facility], [facility].                            N/I - No interpretation available                                                         SDD – Sensitive Dose Dependent      Method Type: YSTMIC  Organism: Blood Culture PCR (02-15-20 @ 14:05)      -  Candida albicans: Detec      Method Type: PCR    Culture - Acid Fast - Bronchial w/Smear (collected 02-03-20 @ 15:00)  Source: .Bronchial None  Preliminary Report (02-12-20 @ 15:05):    No growth at 1 week.    Culture - Fungal, Bronchial (collected 02-03-20 @ 15:00)  Source: .Bronchial None  Preliminary Report (02-12-20 @ 15:02):    No growth    Culture - Bronchial (collected 02-03-20 @ 15:00)  Source: .Bronchial None  Gram Stain (02-04-20 @ 05:52):    No polymorphonuclear cells seen per low power field    No squamous epithelial cells per low power field    No organisms seen  Final Report (02-05-20 @ 19:32):    Normal Respiratory Nikki present        Lactate, Blood: 3.1 mmol/L (02-28-20 @ 05:00)  Lactate, Blood: 1.5 mmol/L (02-27-20 @ 18:34)  Lactate, Blood: 2.2 mmol/L (02-27-20 @ 14:18)  Lactate, Blood: 2.6 mmol/L (02-27-20 @ 10:12)      INFECTIOUS DISEASES TESTING  Fungitell: >500 (02-11-20 @ 11:21)  Fungitell: <31 (02-04-20 @ 04:40)  Streptococcus Pneumoniae Ag Urine: Negative (02-02-20 @ 11:00)  MRSA PCR Result.: Positive (02-01-20 @ 20:40)  Procalcitonin, Serum: 1.86 (02-01-20 @ 20:30)  Procalcitonin, Serum: 1.35 (02-01-20 @ 11:17)  Fungitell: 49 (02-01-20 @ 11:17)  Procalcitonin, Serum: 0.77 (02-01-20 @ 04:46)  Rapid RVP Result: Detected (01-31-20 @ 16:24)  Legionella Antigen, Urine: Negative (01-31-20 @ 15:36)  Streptococcus Pneumoniae Ag Urine: Negative (01-31-20 @ 15:36)  Legionella Antigen, Urine: Negative (01-20-20 @ 02:50)  Procalcitonin, Serum: 0.05 (01-16-20 @ 11:30)  MRSA PCR Result.: Positive (01-14-20 @ 13:59)  Flu A Result: Negative (01-13-20 @ 13:10)  Flu B Result: Negative (01-13-20 @ 13:10)  RSV Result: Negative (01-13-20 @ 13:10)  MRSA PCR Result.: Negative (08-13-19 @ 08:46)      RADIOLOGY & ADDITIONAL TESTS:  I have personally reviewed the last available Chest xray  CXR      CT      CARDIOLOGY TESTING  Transthoracic Echocardiogram:    EXAM:  2-D ECHO (TTE) COMPLETE        PROCEDURE DATE:  02/26/2020      INTERPRETATION:  REPORT:    TRANSTHORACIC ECHOCARDIOGRAM REPORT         Patient Name:   DONI PATRCIK Accession #: [id number]  Medical Rec #:  [id number]     Height:      64.0 in 162.6 cm  YOB: 1945    Weight:      181.0 lb 82.10 kg  Patient Age:    75 years     BSA:         1.87 m²  Patient Gender: F            BP:          106/63 mmHg       Date of Exam:        2/26/2020 4:09:11 PM  Referring Physician: [id number] [facility]  Sonographer:         Peter Peterson  Reading Physician:   Alexander Alex M.D.    Procedure:   2D Echo/Doppler/Color Doppler Complete.  Indications: I26.99 - Other Pulmonary Embolism without Acute Cor Pulmonale  Diagnosis:   Other pulmonary embolism without acute cor pulmonale - I26.99         Summary:   1. LV Ejection Fraction by Caballero's Method with a biplane EF of 70 %.   2. Moderate concentric left ventricular hypertrophy.   3. Spectral Doppler shows impaired relaxation pattern of left ventricular myocardial filling (Grade I diastolic dysfunction).   4. Mild mitral valve regurgitation.   5. Sclerotic aortic valve with normal opening.   6. Estimated pulmonary artery systolic pressure is 36.2 mmHg assuming a right atrial pressure of 5 mmHg, which is consistent with borderline pulmonary hypertension.    PHYSICIAN INTERPRETATION:  Left Ventricle: The left ventricular internal cavity size is normal. There is moderate concentric left ventricular hypertrophy. Spectral Doppler shows impaired relaxation pattern of left ventricular myocardial filling (Grade I diastolic dysfunction).  Right Ventricle: Normal right ventricular size and function.  Left Atrium: Normal left atrial size.  Right Atrium: Normal right atrial size.  Pericardium: There is no evidence of pericardial effusion.  Mitral Valve: Structurally normal mitral valve, with normal leaflet excursion. The mitral valve is normal in structure. Mild mitral valve regurgitation is seen.  Tricuspid Valve: Structurally normal tricuspid valve, with normal leaflet excursion. The tricuspid valve is normal in structure. Mild tricuspid regurgitation is visualized. Estimated pulmonary artery systolic pressure is 36.2 mmHg assuming a right atrial pressure of 5 mmHg, which is consistent with borderline pulmonary hypertension.  Aortic Valve: The aortic valve is trileaflet. No evidence of aortic stenosis. Sclerotic aortic valve with normal opening. Trivial aortic valve regurgitation is seen.  Pulmonic Valve: Structurally normal pulmonic valve, with normal leaflet excursion. The pulmonic valve is normal. Trace pulmonic valve regurgitation.  Aorta: The aortic root and ascending aorta are structurally normal, with no evidence of dilitation.  Pulmonary Artery: The main pulmonary artery is normal in size.       2D AND M-MODE MEASUREMENTS (normal ranges within parentheses):  Left Ventricle:                  Normal   Aorta/Left Atrium:             Normal  IVSd (2D):              1.51 cm (0.7-1.1) AoV Cusp Separation: 1.74 cm (1.5-2.6)  LVPWd (2D):             1.51 cm (0.7-1.1) Left Atrium (Mmode): 2.63 cm (1.9-4.0)  LVIDd (2D):             3.22 cm (3.4-5.7) Right Ventricle:  LVIDs (2D):             1.90 cm           RVd (2D):        2.29 cm  LV FS (2D):             41.1%   (>25%)  Relative Wall Thickness  0.94    (<0.42)    SPECTRAL DOPPLER ANALYSIS:  LV DIASTOLIC FUNCTION:  MV Peak E: 0.60 m/s Decel Time: 229 msec  MV Peak A: 0.88 m/s  E/A Ratio: 0.68    Aortic Valve:  AoV VMax:    1.32 m/s AoV Area, Vmax: 2.41 cm² Vmax Indx: 1.29 cm²/m²  AoV Pk Grad: 7.0 mmHg    LVOT Vmax: 1.02 m/s  LVOT VTI:  0.20 m  LVOT Diam: 2.00 cm    Mitral Valve:  MV P1/2 Time: 66.49 msec  MV Area, PHT: 3.31 cm²    Tricuspid Valve and PA/RV Systolic Pressure: TR Max Velocity: 2.79 m/s RA Pressure: 5 mmHg RVSP/PASP: 36.2 mmHg       [id number] Alexander Alex M.D., Electronically signed on 2/26/2020 at 4:20:25 PM              *** Final ***                    ALEXANDER ALEX MD  This document has been electronically signed. Feb 26 2020  4:09PM             (02-26-20 @ 16:09)  12 Lead ECG:   Ventricular Rate 127 BPM    Atrial Rate 127 BPM    P-R Interval 128 ms    QRS Duration 80 ms    Q-T Interval 300 ms    QTC Calculation(Bezet) 436 ms    P Axis 41 degrees    R Axis 1 degrees    T Axis 32 degrees    Diagnosis Line Sinus tachycardia with Premature atrial complexes  Possible Left atrial enlargement  Nonspecific ST abnormality  Abnormal ECG    Confirmed by Kelsey MOLINA, [facility] ([id number]) on 2/24/2020 8:03:09 AM (02-21-20 @ 18:39)      MEDICATIONS  ALBUTerol    90 MICROgram(s) HFA Inhaler 4  calcitriol  Solution 0.25  calcium carbonate    500 mG (Tums) Chewable 3  chlorhexidine 0.12% Liquid 15  chlorhexidine 4% Liquid 1  cyanocobalamin 1000  dextrose 5%. 1000  diltiazem    Tablet 30  fentaNYL   Infusion. 0.519  gabapentin 300  influenza   Vaccine 0.5  insulin lispro (HumaLOG) corrective regimen sliding scale   ipratropium 17 MICROgram(s) HFA Inhaler 4  meropenem  IVPB   meropenem  IVPB 1000  methylPREDNISolone sodium succinate Injectable 60  midazolam Infusion 0.02  multivitamin/minerals 1  mupirocin 2% Ointment 1  pantoprazole   Suspension 40      WEIGHT  Weight (kg): 72.2 (02-26-20 @ 11:30)  Creatinine, Serum: 0.5 mg/dL (03-03-20 @ 05:20)      ANTIBIOTICS:  meropenem  IVPB      meropenem  IVPB 1000 milliGRAM(s) IV Intermittent every 8 hours      All available historical records have been reviewed

## 2020-03-03 NOTE — PROGRESS NOTE ADULT - ASSESSMENT
IMPRESSION:    Acute hypoxic respiratory failure likely VTE SP GFF   Klebsiella in BAL being treated   DAH resolving  Candidemia SP therapy   HO Autoimmune hepatitis on tacrolimus and solumedrol        PLAN:    CNS:  SAT.  trial of Precedex.     HEENT: ET care.  Oral care.      PULMONARY:  HOB @ 45 degrees. Solumedrol 60 mg q24h.  ABG and adjust vent setting     CARDIOVASCULAR:  I=O.  Cardizem 30 mg Q8    GI: GI prophylaxis.  OG feeding.      RENAL:  Follow up lytes. Replete as needed.     INFECTIOUS DISEASE: Follow up cultures. Finish ABX course     HEMATOLOGICAL:  DVT prophylaxis.  FU CBC and coags     ENDOCRINE:  Follow up FS.  Insulin protocol if needed.    MUSCULOSKELETAL: Bed rest     Prognosis: Poor prognosis

## 2020-03-04 NOTE — PROGRESS NOTE ADULT - SUBJECTIVE AND OBJECTIVE BOX
Chart reviewed, patient examined. Pertinent results reviewed.  Case discussed with HO; specialist f/u reviewed  HD#[id number]  Patient making no progress in weaning or getting off the respirator in the last 48 hours.    HPI:  73y/o F w/ hx of asthma, COPD not on home O2, autoimmune hepatitis on prednisone and tacrolimus, heterozygous prothrombin gene mutation with hx of PE and DVT on coumadin with recent hospitalization in January 2020 with a diagnosis of pneumonia presents for worsening SOB, hemoptysis and cough. Everything started yesterday night when patient was in bed, started to feel shortness of breath and had many episodes of hemoptysis with clots, she also had substernal chest pain non radiating, moderate in intensity that worsens on coughing. She denies fever but endorses chills. She also admits for lightheadedness that occurred yesterday, no HA, abd pain, dysuria or paresthesias. She had 2 loose bowel movements since yesterday with some blood in the stools that she attributes to her hemorrhoids. She stopped Coumadin couple of days ago since her INR was supratherapeutic. She is from [place] and also mentions that her  and another family member have the flu and she was exposed to them. She did not have the flu shot this season.  In ED, temp was 100.1 rectally, tachycardic ( sinus) , she was saturating to 70s on non rebreather and her SaO2 went up to 95%. ABG showing respiratory alkalosis initially and then corrected after BIPAP placement and correction of RR.  CTA chest showing no PE but showing interval development of multifocal bilateral groundglass opacities as well as new moderate to severe bronchiectasis in the right lung. Findings are concerning for an acute infectious/inflammatory process. (31 Jan 2020 14:36)    MEDICATIONS  (STANDING):  ALBUTerol    90 MICROgram(s) HFA Inhaler 4 Puff(s) Inhalation every 6 hours  calcitriol  Solution 0.25 MICROGram(s) Oral daily  calcium carbonate    500 mG (Tums) Chewable 3 Tablet(s) Chew every 8 hours  chlorhexidine 0.12% Liquid 15 milliLiter(s) Oral Mucosa two times a day  chlorhexidine 4% Liquid 1 Application(s) Topical <User Schedule>  cyanocobalamin 1000 MICROGram(s) Oral daily  dexMEDEtomidine Infusion 0.2 MICROgram(s)/kG/Hr (3.61 mL/Hr) IV Continuous <Continuous>  dextrose 5%. 1000 milliLiter(s) (50 mL/Hr) IV Continuous <Continuous>  diltiazem    Tablet 30 milliGRAM(s) Oral every 8 hours  fentaNYL   Infusion. 0.519 MICROgram(s)/kG/Hr (3.75 mL/Hr) IV Continuous <Continuous>  gabapentin 300 milliGRAM(s) Oral at bedtime  influenza   Vaccine 0.5 milliLiter(s) IntraMuscular once  insulin regular Infusion 1 Unit(s)/Hr (1 mL/Hr) IV Continuous <Continuous>  ipratropium 17 MICROgram(s) HFA Inhaler 4 Puff(s) Inhalation every 6 hours  meropenem  IVPB      meropenem  IVPB 1000 milliGRAM(s) IV Intermittent every 8 hours  methylPREDNISolone sodium succinate Injectable 60 milliGRAM(s) IV Push daily  midazolam Infusion 0.02 mG/kG/Hr (1.444 mL/Hr) IV Continuous <Continuous>  multivitamin/minerals 1 Tablet(s) Oral daily  mupirocin 2% Ointment 1 Application(s) Topical two times a day  pantoprazole   Suspension 40 milliGRAM(s) Oral before breakfast    MEDICATIONS  (PRN):  acetaminophen   Tablet .. 650 milliGRAM(s) Oral every 6 hours PRN Temp greater or equal to 38C (100.4F)  glucagon  Injectable 1 milliGRAM(s) IntraMuscular once PRN Glucose LESS THAN 70 milligrams/deciliter    INTERVAL EVENTS: Patient seen today remains vented on FIO2 60%, sedated.     ICU Vital Signs Last 24 Hrs  T(C): 37 (04 Mar 2020 09:00), Max: 37 (04 Mar 2020 09:00)  T(F): 98.6 (04 Mar 2020 09:00), Max: 98.6 (04 Mar 2020 09:00)  HR: 102 (04 Mar 2020 11:00) (66 - 126)  BP: 158/73 (04 Mar 2020 11:00) (81/54 - 200/99)  BP(mean): 93 (04 Mar 2020 11:00) (59 - 136)  ABP: --  ABP(mean): --  RR: 23 (04 Mar 2020 11:00) (14 - 54)  SpO2: 96% (04 Mar 2020 11:00) (92% - 100%)    PHYSICAL EXAM:  GENERAL: Vented, Sedated- on 60%O2  NECK: Supple, No JVD  CHEST/LUNG: Rhonchi  HEART: S1, S2, Tachycardic  ABDOMEN: Soft, Nontender,  Bowel sounds present  EXTREMITIES: Edema, Bruised, Multiple skin tears  SKIN: Multiple skin lesions- Purpuric lesions over trunk and extremities- Emili avulsions scattered    LABS:                        10.0   4.57  )-----------( 53       ( 03 Mar 2020 05:20 )             29.6             03-03    138  |  99  |  33<H>  ----------------------------<  299<H>  4.0   |  24  |  0.5<L>    Ca    7.3<L>      03 Mar 2020 05:20  Mg     1.4     03-03    TPro  5.1<L>  /  Alb  2.1<L>  /  TBili  1.7<H>  /  DBili  x   /  AST  62<H>  /  ALT  66<H>  /  AlkPhos  240<H>  03-03    LIVER FUNCTIONS - ( 03 Mar 2020 05:20 )  Alb: 2.1 g/dL / Pro: 5.1 g/dL / ALK PHOS: 240 U/L / ALT: 66 U/L / AST: 62 U/L / GGT: x             ABG - ( 03 Mar 2020 08:15 )  pH, Arterial: 7.41  pH, Blood: x     /  pCO2: 45    /  pO2: 122   / HCO3: 29    / Base Excess: 3.5   /  SaO2: 98          Culture - Acid Fast - Bronchial w/Smear (02.26.20 @ 14:45)    Specimen Source: .Bronchial None    Acid Fast Bacilli Smear:   No acid fast bacilli seen by fluorochrome stain    Culture Results:   Culture is being performed.      Culture - Fungal, Bronchial (02.26.20 @ 14:45)    Specimen Source: .Bronchial None    Culture Results:   Rare Yeast        Culture - Bronchial (02.26.20 @ 14:45)    -  Amoxicillin/Clavulanic Acid: S <=8/4    -  Amikacin: S <=16    Gram Stain:   Moderate polymorphonuclear leukocytes seen per low power field  Rare Squamous epithelial cells seen per low power field  Numerous Gram Negative Rods seen per oil power field    -  Ciprofloxacin: S <=1    -  Aztreonam: S <=4    -  Cefepime: S <=2    -  Trimethoprim/Sulfamethoxazole: S <=2/38    -  Ertapenem: S <=0.5    -  Gentamicin: S <=2    -  Imipenem: S <=1    -  Levofloxacin: S <=2    -  Meropenem: S <=1    -  Piperacillin/Tazobactam: S <=8    -  Tobramycin: S <=2    -  Ampicillin: R >16 These ampicillin results predict results for amoxicillin    -  Cefazolin: S <=2 Enterobacter, Citrobacter, and Serratia may develop resistance during prolonged therapy (3-4 days)    -  Cefoxitin: S <=8    -  Ampicillin/Sulbactam: S 8/4 Enterobacter, Citrobacter, and Serratia may develop resistance during prolonged therapy (3-4 days)    -  Ceftriaxone: S <=1 Enterobacter, Citrobacter, and Serratia may develop resistance during prolonged therapy    Specimen Source: .Bronchial None    Culture Results:   Numerous Klebsiella pneumoniae  Normal Respiratory Nikki present    Organism Identification: Klebsiella pneumoniae    Organism: Klebsiella pneumoniae    Method Type: EROS      RADIOLOGY & ADDITIONAL TESTS:  < from: Xray Chest 1 View- PORTABLE-Routine (03.03.20 @ 05:04) >  Findings:    Support devices: Stable right IJ central venous catheter and enteric tube. Endotracheal tube tip, approximately 2.6 cm from baljinder.    Cardiac/mediastinum/hilum: Limited evaluation, heart borders are obscured.    Lungparenchyma/Pleura: Unchanged diffuse bilateral opacities. No pneumothorax.    Skeleton/soft tissues: Unchanged.    Impression:      Unchanged diffuse bilateral opacities. No pneumothorax.    Support tubes as per above.        < end of copied text >

## 2020-03-04 NOTE — PROGRESS NOTE ADULT - ASSESSMENT
75 y/o female with pmhx of asthma, HTN,  autoimmune hepatitis, heterozygous prothrombin gene mutation with hx of PE and DVT on coumadin admitted for SOB     Acute hypoxic resp failure with hypoxia due to influenza, HCAP; 10 reintubation secondary to acute PE  Septic shock in immunocompromised patient, Severe sepsis present on admission   - completed course of treatment- antibiotics and antiviral completed for initial infection  - MRSA VAP treated with Zyvox for 10 days   - remains intubated and sedated- & failing initial weaning attempts  - PE, HCAP pneumonia, GNR on BAL : Klebsiella   - GFF placed due to PE   - remains on IV Solu-medrol  - ID f/u noted, remains on Meropenem  - continue Albuterol    Multiple Skin tears/ wounds  - local care  - elevate extremities  - await burn evaluation    Candedemia  - central line change noted  - cultures negative x2  - 2D echo no evidence of vegetation   - ophth evaluation noted, no clinical evidence, low suspicion for ocular fungemia/endogenous endophthalmitis   - completed treatment  2/27    REJI on CKD 3  - creatinine normal  - renal f/u noted  - urine output noted  - continue to monitor  - follow renal recommendations    Acute Blood Loss Anemia  - had rectal tube earlier on this adm which was discontinued due to ulcer  - on Protonix  - has had multiple PRBC's this adm    Autoimmune hepatitis  - On prednisone 60 mg PO qd and Tacrolimus at home  - now on Solu-medrol  - Tacrolimus held    HTN by hx  - now off Labetolol    Heterozygous prothrombin gene mutation with hx of PE and DVT on coumadin:  - Coumadin had been held for alveolar hemorrhage     Protein Malnutrition  - Alb noted, trending back up  - tolerating enteral feedings    Diet: Enteral feedings   GI PPx: Protonix  DVT PPx: sequentials  Activity: bedrest  Dispo: readmitted to hospital from SA rehab at [facility], remains ICU      Continue supportive measures, patient remains acutely ill,  Overall prognosis poor.   Had prolonged and niki discussion with patient's , Alejo.  We discussed his prior feelings about prolonged debilitating situation.  At this point pulmonary/critical care tries to reattempt extubation and weaning over the coming few days to 1 week.  At some point patient may require tracheostomy and that was discussed.  Potential outcomes with tracheostomy were discussed discussed including waking up being communicative and slowly improving and possibly weaning versus remaining dependent on tracheostomy for prolonged period of time.  There is also the possibility of the patient not waking him much at all once sedatives are held.  Patient understands these choices and at this point wants to forge ahead including resuscitation even at this point.  He will review this from time to time as the situation develops.  Meantime see Dr. Valladares's request yesterday we would like a patient seen by Burn again to look at the patient's wounds.

## 2020-03-04 NOTE — PROGRESS NOTE ADULT - SUBJECTIVE AND OBJECTIVE BOX
Patient is a 75y old  Female who presents with a chief complaint of SOB and desaturation (03 Mar 2020 21:15)        Over Night Events:  Remains on MV.  off pressors.  On sedation vacation         ROS:     CONSTITUTIONAL:   no fever   no chills.  no weight gain   no weight loss    EYES:   no discharge,   no pain  no redness,   no visual changes.    ENT:   Ears: no ear pain and no hearing problems.  Nose: no nasal congestion and no nasal drainage.  Mouth/Throat: no dysphagia,  no hoarseness and no throat pain.  Neck: no lumps, no pain, no stiffness and no swollen glands.     CARDIOVASCULAR:   no chest pain,   no swelling  no palpitaions  no syncope    RESPIRATORY:  Per HPI    GASTROINTESTINAL:   no abdominal pain,   no constipation,   no diarrhea,   no vomiting.    GENITOURINARY:  no dysuria,   no frequency,   no urgency  no hematuria.    MUSCULOSKELETAL:   no back pain,   no musculoskeletal pain,  no weakness.    SKIN:   no jaundice,   no lesions,   no pruritis,   no rashes.    NEURO:   Sedated     PSYCHIATRIC:   no known mental health issues  no anxiety  no depression    ALLERGIC/IMMUNOLOGIC:   No active allergic or immunologic issues        PHYSICAL EXAM    ICU Vital Signs Last 24 Hrs  T(C): 36.7 (04 Mar 2020 00:00), Max: 36.7 (04 Mar 2020 00:00)  T(F): 98 (04 Mar 2020 00:00), Max: 98 (04 Mar 2020 00:00)  HR: 86 (04 Mar 2020 07:00) (66 - 126)  BP: 102/58 (04 Mar 2020 07:00) (81/54 - 193/90)  BP(mean): 70 (04 Mar 2020 07:00) (59 - 131)  ABP: --  ABP(mean): --  RR: 27 (04 Mar 2020 07:00) (14 - 43)  SpO2: 100% (04 Mar 2020 07:00) (92% - 100%)      CONSTITUTIONAL:   Ill appearing.  Well nourished.  NAD    ENT:   Airway patent,   Mouth with normal mucosa.   No thrush    CARDIAC:   Tachycardic   Regular rhythm.     No edema      RESPIRATORY:   NO wheezing  Bilateral BS  Normal chest expansion  Not tachypneic,  No use of accessory muscles    GASTROINTESTINAL:  Abdomen soft,   Non-tender,   No guarding,   + BS    MUSCULOSKELETAL:   Range of motion is not limited,  No clubbing, cyanosis    NEUROLOGICAL:   Opens eyes.  Does not follow commands     SKIN:   Skin normal color for race,   warm, dry   No evidence of rash.        03-03-20 @ 07:01  -  03-04-20 @ 07:00  --------------------------------------------------------  IN:    dexmedetomidine Infusion: 23.4 mL    fentaNYL Infusion.: 260.1 mL    fentaNYL Infusion.: 419.1 mL    Free Water: 750 mL    insulin regular Infusion: 73 mL    IV PiggyBack: 100 mL    Osmolite: 720 mL  Total IN: 2345.6 mL    OUT:    Ureteral Catheter: 1005 mL  Total OUT: 1005 mL    Total NET: 1340.6 mL          LABS:                            8.7    4.03  )-----------( 48       ( 04 Mar 2020 04:00 )             27.2                                               03-04    139  |  100  |  34<H>  ----------------------------<  286<H>  3.6   |  29  |  0.5<L>    Ca    7.3<L>      04 Mar 2020 04:00  Mg     1.5     03-04    TPro  4.5<L>  /  Alb  1.8<L>  /  TBili  1.6<H>  /  DBili  x   /  AST  71<H>  /  ALT  74<H>  /  AlkPhos  281<H>  03-04                                                                                           LIVER FUNCTIONS - ( 04 Mar 2020 04:00 )  Alb: 1.8 g/dL / Pro: 4.5 g/dL / ALK PHOS: 281 U/L / ALT: 74 U/L / AST: 71 U/L / GGT: x                                                                                               Mode: AC/ CMV (Assist Control/ Continuous Mandatory Ventilation)  RR (machine): 16  TV (machine): 450  FiO2: 60  PEEP: 7.5  ITime: 1  MAP: 18  PIP: 37                                      ABG - ( 04 Mar 2020 04:53 )  pH, Arterial: 7.37  pH, Blood: x     /  pCO2: 49    /  pO2: 63    / HCO3: 29    / Base Excess: 2.9   /  SaO2: 93    3.1              MEDICATIONS  (STANDING):  ALBUTerol    90 MICROgram(s) HFA Inhaler 4 Puff(s) Inhalation every 6 hours  calcitriol  Solution 0.25 MICROGram(s) Oral daily  calcium carbonate    500 mG (Tums) Chewable 3 Tablet(s) Chew every 8 hours  chlorhexidine 0.12% Liquid 15 milliLiter(s) Oral Mucosa two times a day  chlorhexidine 4% Liquid 1 Application(s) Topical <User Schedule>  cyanocobalamin 1000 MICROGram(s) Oral daily  dexMEDEtomidine Infusion 0.2 MICROgram(s)/kG/Hr (3.61 mL/Hr) IV Continuous <Continuous>  dextrose 5%. 1000 milliLiter(s) (50 mL/Hr) IV Continuous <Continuous>  diltiazem    Tablet 30 milliGRAM(s) Oral every 8 hours  fentaNYL   Infusion. 0.5 MICROgram(s)/kG/Hr (3.61 mL/Hr) IV Continuous <Continuous>  furosemide   Injectable 40 milliGRAM(s) IV Push once  gabapentin 300 milliGRAM(s) Oral at bedtime  influenza   Vaccine 0.5 milliLiter(s) IntraMuscular once  insulin regular Infusion 1 Unit(s)/Hr (1 mL/Hr) IV Continuous <Continuous>  ipratropium 17 MICROgram(s) HFA Inhaler 4 Puff(s) Inhalation every 6 hours  meropenem  IVPB      meropenem  IVPB 1000 milliGRAM(s) IV Intermittent every 8 hours  methylPREDNISolone sodium succinate Injectable 60 milliGRAM(s) IV Push daily  metoprolol tartrate Injectable 5 milliGRAM(s) IV Push once  midazolam Infusion 0.02 mG/kG/Hr (1.444 mL/Hr) IV Continuous <Continuous>  multivitamin/minerals 1 Tablet(s) Oral daily  mupirocin 2% Ointment 1 Application(s) Topical two times a day  pantoprazole   Suspension 40 milliGRAM(s) Oral before breakfast    MEDICATIONS  (PRN):  acetaminophen   Tablet .. 650 milliGRAM(s) Oral every 6 hours PRN Temp greater or equal to 38C (100.4F)  glucagon  Injectable 1 milliGRAM(s) IntraMuscular once PRN Glucose LESS THAN 70 milligrams/deciliter      New X-rays reviewed:                                                                                  ECHO    CXR interpreted by me:  ET OG OK>  TLC OK>  Improving infiltrates

## 2020-03-04 NOTE — PROGRESS NOTE ADULT - ASSESSMENT
IMPRESSION:    Acute hypoxic respiratory failure likely VTE SP GFF   Klebsiella in BAL being treated   DAH resolving  Candidemia SP therapy   HO Autoimmune hepatitis on tacrolimus and solumedrol        PLAN:    CNS:  SAT.  trial of Precedex.     HEENT: ET care.  Oral care.      PULMONARY:  HOB @ 45 degrees. Solumedrol 40 mg q24h.  SBT.      CARDIOVASCULAR:  I=O.  Lasix and Lopressor iv x1    GI: GI prophylaxis.  OG feeding.      RENAL:  Follow up lytes. Replete as needed.     INFECTIOUS DISEASE: Follow up cultures. Finish ABX course     HEMATOLOGICAL:  DVT prophylaxis.  FU CBC and coags     ENDOCRINE:  Follow up FS.  Insulin protocol if needed.    MUSCULOSKELETAL: Bed rest     Prognosis: Poor prognosis

## 2020-03-04 NOTE — PROGRESS NOTE ADULT - ASSESSMENT
ASSESSMENT  73y/o F w/ hx of asthma, COPD not on home O2, autoimmune hepatitis on prednisone and tacrolimus, heterozygous prothrombin gene mutation with hx of PE and DVT on coumadin with recent hospitalization in January 2020 with a diagnosis of pneumonia presents for worsening SOB, hemoptysis and cough.    IMPRESSION  #GNR PNA- BAL 2/26 with   Numerous Klebsiella pneumoniae (panS)    Fungal cx from bronch -   Rare Yeast likely colonization    Acute hypoxemia, intubated 2/26, possible PE given +DVTs s/p Option Elite IVC filter via Right IJ access.  #1/16 Bronch cx AFB + 1/2 specimens    Possible MAC, given CT findings and +bronch specimen, would qualify for treatment per guidelines  #GIB  #Thrombocytopenia    continues post stopping linezolid   #Hx recent MRSA VAP    2/14 tracheal cx   Moderate Methicillin resistant Staphylococcus aureus    completed 8 day course, noted to have thrombocytopenia on linezolid  #Candidemia Fungitell: >500 (02-11-20 @ 11:21) likely CLABSI    2/11 BCX +, 2/12 BCX +, 2/13 BCX (-) RIJ 2/11. Groin a-line 2/4- removed 2/13     Fungitell: <31 (02-04-20 @ 04:40), Fungitell: 49 (02-01-20 @ 11:17)    Ophtho- no endophthalmitis   #Flu + AH1, Alveolar hemorrhage, ?superimposed atypical PNA (imaging not really consistent with bacterial PNA). Recent bronch 1/20 negative for PCP, Fungal, etc, previous bronch cx with yeast (likely oral contaminant) since GMS neg    2/3 Bronch   No organisms seen, 2/3 cytopath Rare atypical cells, few ciliated bronchial cells, alveolar macrophages and inflammatory cells, mainly neutrophils.    Strep urine Ag neg, serum crypto Ag neg, CMV PCR undetectable, AFB neg    Quantiferon indeterminate    Lactate Dehydrogenase, Serum: 607 (02.03.20 @ 04:30)    Procalcitonin, Serum: 1.86:    CTA chest showing no PE but showing interval development of multifocal bilateral groundglass opacities as well as new moderate to severe bronchiectasis in the right lung,   1/13 CT b/l GGOs, compatible with intersitial edema      During last hospitalization: bronch cx bacterial NG, +yeast, no ID, neg legionella, + MRSA PCR    MRSA PCR Result.: Positive (02-01-20 @ 20:40)  #Severe sepsis on admission P>90, WBC 19, RR>20, lactic acidosis Lactate, Blood: 2.9 mmol/L (01-31-20 @ 09:25)    afebrile   #Elevated Alk ph, transaminitis  #LLE wound, not infection     12/7 WCX MSSA, Kleb, bacteroides    8/2019 MRSA in a wound   #Immunosuppressed: autoimmune hepatitis on prednisone and tacrolimus    RECOMMENDATIONS  - Kleb in bronch is panS, no hx MDRO, recommend narrowing shyann to zosyn 3.375 q6h IV  (D7 on abx 2/27-)   - f/u AFB from 1/15 ID,  (possible MAC vs other NTM)   - Repeat fungitell (yeast in bronch likely colonizer)   - grave prognosis    Spectra [id number]

## 2020-03-04 NOTE — PROGRESS NOTE ADULT - SUBJECTIVE AND OBJECTIVE BOX
DARNELL, DONI  75y, Female  Allergy: No Known Allergies      LOS  33d    CHIEF COMPLAINT: SOB and desaturation (04 Mar 2020 12:31)      INTERVAL EVENTS/HPI  - No acute events overnight  - T(F): , Max: 98.6 (03-04-20 @ 09:00)  - Denies any worsening symptoms  - Tolerating medication  - WBC Count: 4.03 (03-04-20 @ 04:00)  WBC Count: 4.57 (03-03-20 @ 05:20)  - Creatinine, Serum: 0.5 (03-04-20 @ 04:00)  Creatinine, Serum: 0.5 (03-03-20 @ 05:20)       ROS  unable to obtain history secondary to patient's mental status and/or sedation     VITALS:  T(F): 98.6, Max: 98.6 (03-04-20 @ 09:00)  HR: 102  BP: 158/73  RR: 23Vital Signs Last 24 Hrs  T(C): 37 (04 Mar 2020 09:00), Max: 37 (04 Mar 2020 09:00)  T(F): 98.6 (04 Mar 2020 09:00), Max: 98.6 (04 Mar 2020 09:00)  HR: 102 (04 Mar 2020 11:00) (66 - 126)  BP: 158/73 (04 Mar 2020 11:00) (81/54 - 200/99)  BP(mean): 93 (04 Mar 2020 11:00) (59 - 136)  RR: 23 (04 Mar 2020 11:00) (14 - 54)  SpO2: 96% (04 Mar 2020 11:00) (92% - 100%)    PHYSICAL EXAM:  Gen: chronically ill appearing intubated  HEENT: Normocephalic, atraumatic  Neck: supple, no lymphadenopathy  CV: Regular rate & regular rhythm  Lungs: decreased BS at bases, no fremitus  Abdomen: Soft, BS present  Ext: Warm, well perfused, anasarca  Neuro: sedated  Skin: no rash, no erythema, multiple ecchymoses  Lines: no phlebitis, IJ, midline      FH: Non-contributory  Social Hx: Non-contributory    TESTS & MEASUREMENTS:                        8.7    4.03  )-----------( 48       ( 04 Mar 2020 04:00 )             27.2     03-04    139  |  100  |  34<H>  ----------------------------<  286<H>  3.6   |  29  |  0.5<L>    Ca    7.3<L>      04 Mar 2020 04:00  Mg     1.5     03-04    TPro  4.5<L>  /  Alb  1.8<L>  /  TBili  1.6<H>  /  DBili  x   /  AST  71<H>  /  ALT  74<H>  /  AlkPhos  281<H>  03-04    eGFR if Non African American: 95 mL/min/1.73M2 (03-04-20 @ 04:00)  eGFR if African American: 110 mL/min/1.73M2 (03-04-20 @ 04:00)    LIVER FUNCTIONS - ( 04 Mar 2020 04:00 )  Alb: 1.8 g/dL / Pro: 4.5 g/dL / ALK PHOS: 281 U/L / ALT: 74 U/L / AST: 71 U/L / GGT: x               Culture - Acid Fast - Bronchial w/Smear (collected 02-26-20 @ 14:45)  Source: .Bronchial None  Preliminary Report (02-29-20 @ 15:04):    Culture is being performed.    Culture - Fungal, Bronchial (collected 02-26-20 @ 14:45)  Source: .Bronchial None  Preliminary Report (03-02-20 @ 08:54):    Rare Yeast    Culture - Bronchial (collected 02-26-20 @ 14:45)  Source: .Bronchial None  Gram Stain (02-27-20 @ 07:36):    Moderate polymorphonuclear leukocytes seen per low power field    Rare Squamous epithelial cells seen per low power field    Numerous Gram Negative Rods seen per oil power field  Final Report (02-28-20 @ 21:13):    Numerous Klebsiella pneumoniae    Normal Respiratory Nikki present  Organism: Klebsiella pneumoniae (02-28-20 @ 21:13)  Organism: Klebsiella pneumoniae (02-28-20 @ 21:13)      -  Amikacin: S <=16      -  Amoxicillin/Clavulanic Acid: S <=8/4      -  Ampicillin: R >16 These ampicillin results predict results for amoxicillin      -  Ampicillin/Sulbactam: S 8/4 Enterobacter, Citrobacter, and Serratia may develop resistance during prolonged therapy (3-4 days)      -  Aztreonam: S <=4      -  Cefazolin: S <=2 Enterobacter, Citrobacter, and Serratia may develop resistance during prolonged therapy (3-4 days)      -  Cefepime: S <=2      -  Cefoxitin: S <=8      -  Ceftriaxone: S <=1 Enterobacter, Citrobacter, and Serratia may develop resistance during prolonged therapy      -  Ciprofloxacin: S <=1      -  Ertapenem: S <=0.5      -  Gentamicin: S <=2      -  Imipenem: S <=1      -  Levofloxacin: S <=2      -  Meropenem: S <=1      -  Piperacillin/Tazobactam: S <=8      -  Tobramycin: S <=2      -  Trimethoprim/Sulfamethoxazole: S <=2/38      Method Type: EROS    Culture - Blood (collected 02-17-20 @ 05:17)  Source: .Blood None  Final Report (02-22-20 @ 14:00):    No growth at 5 days.    Culture - Blood (collected 02-15-20 @ 04:57)  Source: .Blood None  Final Report (02-20-20 @ 18:01):    No growth at 5 days.    Culture - Sputum (collected 02-14-20 @ 17:00)  Source: .Sputum Sputum  Gram Stain (02-15-20 @ 04:56):    Few Squamous epithelial cells per low power field    Few polymorphonuclear leukocytes per low power field    Few Yeast like cells per oil power field    Few Gram variable coccobacilli per oil power field  Final Report (02-16-20 @ 17:44):    Moderate Methicillin resistant Staphylococcus aureus    Normal Respiratory Nikki present  Organism: Methicillin resistant Staphylococcus aureus (02-16-20 @ 17:44)  Organism: Methicillin resistant Staphylococcus aureus (02-16-20 @ 17:44)      -  Ampicillin/Sulbactam: R <=8/4      -  Cefazolin: R <=4      -  Clindamycin: S <=0.25      -  Erythromycin: R >4      -  Gentamicin: S <=1 Should not be used as monotherapy      -  Linezolid: S 2      -  Oxacillin: R >2      -  Penicillin: R >8      -  RIF- Rifampin: S <=1 Should not be used as monotherapy      -  Tetra/Doxy: S <=1      -  Trimethoprim/Sulfamethoxazole: S <=0.5/9.5      -  Vancomycin: S 2      Method Type: EROS    Culture - Blood (collected 02-14-20 @ 04:30)  Source: .Blood Blood-Peripheral  Final Report (02-19-20 @ 14:00):    No growth at 5 days.    Culture - Other (collected 02-13-20 @ 18:55)  Source: .Other wound  Final Report (02-15-20 @ 19:22):    No growth    Culture - Blood (collected 02-12-20 @ 04:50)  Source: .Blood None  Gram Stain (02-13-20 @ 14:24):    Growth in aerobic bottle: Yeast like cells  Final Report (02-18-20 @ 15:03):    Growth in aerobic bottle: Candida albicans    See previous culture [id number]    Culture - Blood (collected 02-11-20 @ 11:21)  Source: .Blood None  Gram Stain (02-12-20 @ 16:48):    Growth in aerobic bottle: Yeast like cells  Final Report (02-15-20 @ 14:05):    Growth in aerobic bottle: Candida albicans    ***Blood Panel PCR results on this specimen are available    approximately 3 hours after the Gram stain result.***    Gram stain, PCR, and/or culture results may not always    correspond due to difference in methodologies.    ************************************************************    This PCR assay was performed using [employer].    The following targets are tested for: Enterococcus,    vancomycin resistant enterococci, Listeria monocytogenes,    coagulase negative staphylococci, S. aureus,    methicillin resistant S. aureus, Streptococcus agalactiae    (Group B), S. pneumoniae, S. pyogenes (Group A),    Acinetobacter baumannii, Enterobacter cloacae, E. coli,    Klebsiella oxytoca, K. pneumoniae, Proteus sp.,    Serratia marcescens, Haemophilus influenzae,    Neisseria meningitidis, Pseudomonas aeruginosa, Candida    albicans, C. glabrata, C krusei, C parapsilosis,    C. tropicalis and the KPC resistance gene.  Organism: Blood Culture PCR  Candida albicans (02-15-20 @ 14:05)  Organism: Candida albicans (02-15-20 @ 14:05)      -  Fluconazole: S 0.25 Fluconazole = Data established for mucosal disease, and is generally applicable for invasive disease.  Susceptibility is dependent on achieving the maximal possible blood level.  Doses of 400 MG/day or more may be required in adults with normal renalfunction and body habitus.      -  Interpretation: See note This test method is not approved by the FDA and is for research use only.  The performance characteristics of this assay may have been determined by [employer] and [facility], [facility].[place].                            N/I - No interpretation available                                                         SDD – Sensitive Dose Dependent      Method Type: YSTMIC  Organism: Blood Culture PCR (02-15-20 @ 14:05)      -  Candida albicans: Detec      Method Type: PCR    Culture - Acid Fast - Bronchial w/Smear (collected 02-03-20 @ 15:00)  Source: .Bronchial None  Preliminary Report (02-12-20 @ 15:05):    No growth at 1 week.    Culture - Fungal, Bronchial (collected 02-03-20 @ 15:00)  Source: .Bronchial None  Preliminary Report (02-12-20 @ 15:02):    No growth    Culture - Bronchial (collected 02-03-20 @ 15:00)  Source: .Bronchial None  Gram Stain (02-04-20 @ 05:52):    No polymorphonuclear cells seen per low power field    No squamous epithelial cells per low power field    No organisms seen  Final Report (02-05-20 @ 19:32):    Normal Respiratory Nikki present            INFECTIOUS DISEASES TESTING  Fungitell: >500 (02-11-20 @ 11:21)  Fungitell: <31 (02-04-20 @ 04:40)  Streptococcus Pneumoniae Ag Urine: Negative (02-02-20 @ 11:00)  MRSA PCR Result.: Positive (02-01-20 @ 20:40)  Procalcitonin, Serum: 1.86 (02-01-20 @ 20:30)  Procalcitonin, Serum: 1.35 (02-01-20 @ 11:17)  Fungitell: 49 (02-01-20 @ 11:17)  Procalcitonin, Serum: 0.77 (02-01-20 @ 04:46)  Rapid RVP Result: Detected (01-31-20 @ 16:24)  Legionella Antigen, Urine: Negative (01-31-20 @ 15:36)  Streptococcus Pneumoniae Ag Urine: Negative (01-31-20 @ 15:36)  Legionella Antigen, Urine: Negative (01-20-20 @ 02:50)  Procalcitonin, Serum: 0.05 (01-16-20 @ 11:30)  MRSA PCR Result.: Positive (01-14-20 @ 13:59)  Flu A Result: Negative (01-13-20 @ 13:10)  Flu B Result: Negative (01-13-20 @ 13:10)  RSV Result: Negative (01-13-20 @ 13:10)  MRSA PCR Result.: Negative (08-13-19 @ 08:46)      RADIOLOGY & ADDITIONAL TESTS:  I have personally reviewed the last available Chest xray  CXR      CT      CARDIOLOGY TESTING  Transthoracic Echocardiogram:    EXAM:  2-D ECHO (TTE) COMPLETE        PROCEDURE DATE:  02/26/2020      INTERPRETATION:  REPORT:    TRANSTHORACIC ECHOCARDIOGRAM REPORT         Patient Name:   DONI PATRICK Accession #: [id number]  Medical Rec #:  [id number]     Height:      64.0 in 162.6 cm  YOB: 1945    Weight:      181.0 lb 82.10 kg  Patient Age:    75 years     BSA:         1.87 m²  Patient Gender: F            BP:          106/63 mmHg       Date of Exam:        2/26/2020 4:09:11 PM  Referring Physician: [id number] [facility]  Sonographer:         Peter Peterson  Reading Physician:   Alexander Alex M.D.    Procedure:   2D Echo/Doppler/Color Doppler Complete.  Indications: I26.99 - Other Pulmonary Embolism without Acute Cor Pulmonale  Diagnosis:   Other pulmonary embolism without acute cor pulmonale - I26.99         Summary:   1. LV Ejection Fraction by Caballero's Method with a biplane EF of 70 %.   2. Moderate concentric left ventricular hypertrophy.   3. Spectral Doppler shows impaired relaxation pattern of left ventricular myocardial filling (Grade I diastolic dysfunction).   4. Mild mitral valve regurgitation.   5. Sclerotic aortic valve with normal opening.   6. Estimated pulmonary artery systolic pressure is 36.2 mmHg assuming a right atrial pressure of 5 mmHg, which is consistent with borderline pulmonary hypertension.    PHYSICIAN INTERPRETATION:  Left Ventricle: The left ventricular internal cavity size is normal. There is moderate concentric left ventricular hypertrophy. Spectral Doppler shows impaired relaxation pattern of left ventricular myocardial filling (Grade I diastolic dysfunction).  Right Ventricle: Normal right ventricular size and function.  Left Atrium: Normal left atrial size.  Right Atrium: Normal right atrial size.  Pericardium: There is no evidence of pericardial effusion.  Mitral Valve: Structurally normal mitral valve, with normal leaflet excursion. The mitral valve is normal in structure. Mild mitral valve regurgitation is seen.  Tricuspid Valve: Structurally normal tricuspid valve, with normal leaflet excursion. The tricuspid valve is normal in structure. Mild tricuspid regurgitation is visualized. Estimated pulmonary artery systolic pressure is 36.2 mmHg assuming a right atrial pressure of 5 mmHg, which is consistent with borderline pulmonary hypertension.  Aortic Valve: The aortic valve is trileaflet. No evidence of aortic stenosis. Sclerotic aortic valve with normal opening. Trivial aortic valve regurgitation is seen.  Pulmonic Valve: Structurally normal pulmonic valve, with normal leaflet excursion. The pulmonic valve is normal. Trace pulmonic valve regurgitation.  Aorta: The aortic root and ascending aorta are structurally normal, with no evidence of dilitation.  Pulmonary Artery: The main pulmonary artery is normal in size.       2D AND M-MODE MEASUREMENTS (normal ranges within parentheses):  Left Ventricle:                  Normal   Aorta/Left Atrium:             Normal  IVSd (2D):              1.51 cm (0.7-1.1) AoV Cusp Separation: 1.74 cm (1.5-2.6)  LVPWd (2D):             1.51 cm (0.7-1.1) Left Atrium (Mmode): 2.63 cm (1.9-4.0)  LVIDd (2D):             3.22 cm (3.4-5.7) Right Ventricle:  LVIDs (2D):             1.90 cm           RVd (2D):        2.29 cm  LV FS (2D):             41.1%   (>25%)  Relative Wall Thickness  0.94    (<0.42)    SPECTRAL DOPPLER ANALYSIS:  LV DIASTOLIC FUNCTION:  MV Peak E: 0.60 m/s Decel Time: 229 msec  MV Peak A: 0.88 m/s  E/A Ratio: 0.68    Aortic Valve:  AoV VMax:    1.32 m/s AoV Area, Vmax: 2.41 cm² Vmax Indx: 1.29 cm²/m²  AoV Pk Grad: 7.0 mmHg    LVOT Vmax: 1.02 m/s  LVOT VTI:  0.20 m  LVOT Diam: 2.00 cm    Mitral Valve:  MV P1/2 Time: 66.49 msec  MV Area, PHT: 3.31 cm²    Tricuspid Valve and PA/RV Systolic Pressure: TR Max Velocity: 2.79 m/s RA Pressure: 5 mmHg RVSP/PASP: 36.2 mmHg       [id number] Alexander Alex M.D., Electronically signed on 2/26/2020 at 4:20:25 PM              *** Final ***                    ALEXANDER ALEX MD  This document has been electronically signed. Feb 26 2020  4:09PM             (02-26-20 @ 16:09)  12 Lead ECG:   Ventricular Rate 127 BPM    Atrial Rate 127 BPM    P-R Interval 128 ms    QRS Duration 80 ms    Q-T Interval 300 ms    QTC Calculation(Bezet) 436 ms    P Axis 41 degrees    R Axis 1 degrees    T Axis 32 degrees    Diagnosis Line Sinus tachycardia with Premature atrial complexes  Possible Left atrial enlargement  Nonspecific ST abnormality  Abnormal ECG    Confirmed by Coty Cole MDfer ([id number]) on 2/24/2020 8:03:09 AM (02-21-20 @ 18:39)      MEDICATIONS  ALBUTerol    90 MICROgram(s) HFA Inhaler 4  calcitriol  Solution 0.25  calcium carbonate    500 mG (Tums) Chewable 3  chlorhexidine 0.12% Liquid 15  chlorhexidine 4% Liquid 1  cyanocobalamin 1000  dexMEDEtomidine Infusion 0.2  dextrose 5%. 1000  diltiazem    Tablet 60  fentaNYL   Infusion. 0.5  gabapentin 300  influenza   Vaccine 0.5  insulin regular Infusion 1  ipratropium 17 MICROgram(s) HFA Inhaler 4  meropenem  IVPB   meropenem  IVPB 1000  methylPREDNISolone sodium succinate Injectable 60  midazolam Infusion 0.02  multivitamin/minerals 1  mupirocin 2% Ointment 1  pantoprazole   Suspension 40      WEIGHT  Weight (kg): 72.2 (02-26-20 @ 11:30)  Creatinine, Serum: 0.5 mg/dL (03-04-20 @ 04:00)      ANTIBIOTICS:  meropenem  IVPB      meropenem  IVPB 1000 milliGRAM(s) IV Intermittent every 8 hours      All available historical records have been reviewed

## 2020-03-04 NOTE — PROGRESS NOTE ADULT - ASSESSMENT
74y female with PMH of asthma, COPD not on home O2, autoimmune hepatitis on prednisone and tacrolimus, heterozygous prothrombin gene mutation with hx of PE and DVT on coumadin presents to the hospital complaining of cough, hemoptysis. Found to have viral pneumonia with respiratory failure and intubated. Extubated after prolonged course but required re-intubation 2/26 for worsening respiratory status and received IVC filter 2/26 because pt not safe for AC (alveolar hemorrhage).  As per palliative, Pt's family will continue treatment course and may possibly pursue trach for now.      #) Diffuse Alveoloar hemorrhage  - completed Zyvox, Levaquin, Cefepime course, and oseltamivir   - C/w Duoneb   - Solumedrol IV 40mg q24hr  - Bronch results - neg culture, neg fungal, cytology positive for inflammatory cells, no organisms  - Bronch 2/16 showed bleeding from DAH  - Bronch 2/26 for BAL, cultures show gram - rods, start meropenem 2/27    #) Tachycardia  - start cardizem 60 mg q8  - IV 10 metoprolol pushes if persistent tachy    #) Candidemia, secondary to central line  - cultures neg since 2/14  - oral fluconazole 14 days, switched to caspofungin for rising LFT (2/26), completed 2/27  - ID following  - repeat fungitell 3/4    #) Rectal Bleeding   - GI following   - received 1U of blood 2/23, hemoglobin stable  - Protonix 40mg PO qd   - FlexSig showed 2 rectal ulcers one clean one crater, s/p clipping  - CBC q24  - Avoid Dignshield or rectal tube   - s/p sigmoidoscopy recurrent rectal bleeding possibly from diverticulosis 2/28, received 2U pRBC,   - will need CT angio if bleeding again, f/u CBC    #) 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  - duplex shows DVT consistent with prior  - not candidate for AC at this time due to alveolar hemorrhage   - IVC filter placed 2/26    #Immunosuppressed: Autoimmune Hepatitis   - solumedrol 60 mg qd  - holding home prednisone 60 mg PO qd   - CMV PCR neg this admission   - f/u repeat tarcolimus level    #HTN  - Continue amlodipine 10 qd, Lopressor 50 BID    #Heterozygous prothrombin gene mutation with hx of PE and DVT on coumadin  - holding coumadin for now due to alveolar hemorrhage   - monitor coags    #0.5 cm of GB polyp  - repeat US abdomen in 6 months per GI    #Hx of asthma  - C/w montelukast qd    #Deconditioning   - pt severely deconditioned due to prolonged hospital stay  - PT/Rehab     #) MISC  Activity: intubated  DVT ppx: IVC filter  GI ppx: protonix  Diet: tube feeding  Code: Full  Dispo: medical optimization  CHG

## 2020-03-04 NOTE — PROGRESS NOTE ADULT - SUBJECTIVE AND OBJECTIVE BOX
PATIENT:  DONI PATRICK  [id number]    CHIEF COMPLAINT:  Patient is a 75y old  Female who presents with a chief complaint of SOB and desaturation (04 Mar 2020 12:48)      INTERVAL HISTORYOVERNIGHT EVENTS:          MEDICATIONS:  MEDICATIONS  (STANDING):  ALBUTerol    90 MICROgram(s) HFA Inhaler 4 Puff(s) Inhalation every 6 hours  calcitriol  Solution 0.25 MICROGram(s) Oral daily  calcium carbonate    500 mG (Tums) Chewable 3 Tablet(s) Chew every 8 hours  chlorhexidine 0.12% Liquid 15 milliLiter(s) Oral Mucosa two times a day  chlorhexidine 4% Liquid 1 Application(s) Topical <User Schedule>  cyanocobalamin 1000 MICROGram(s) Oral daily  dexMEDEtomidine Infusion 0.2 MICROgram(s)/kG/Hr (3.61 mL/Hr) IV Continuous <Continuous>  dextrose 5%. 1000 milliLiter(s) (50 mL/Hr) IV Continuous <Continuous>  diltiazem    Tablet 60 milliGRAM(s) Oral every 8 hours  fentaNYL   Infusion. 0.5 MICROgram(s)/kG/Hr (3.61 mL/Hr) IV Continuous <Continuous>  gabapentin 300 milliGRAM(s) Oral at bedtime  influenza   Vaccine 0.5 milliLiter(s) IntraMuscular once  insulin regular Infusion 1 Unit(s)/Hr (1 mL/Hr) IV Continuous <Continuous>  ipratropium 17 MICROgram(s) HFA Inhaler 4 Puff(s) Inhalation every 6 hours  meropenem  IVPB      meropenem  IVPB 1000 milliGRAM(s) IV Intermittent every 8 hours  methylPREDNISolone sodium succinate Injectable 60 milliGRAM(s) IV Push daily  midazolam Infusion 0.02 mG/kG/Hr (1.444 mL/Hr) IV Continuous <Continuous>  multivitamin/minerals 1 Tablet(s) Oral daily  mupirocin 2% Ointment 1 Application(s) Topical two times a day  norepinephrine Infusion 0.05 MICROgram(s)/kG/Min (3.384 mL/Hr) IV Continuous <Continuous>  pantoprazole   Suspension 40 milliGRAM(s) Oral before breakfast    MEDICATIONS  (PRN):  acetaminophen   Tablet .. 650 milliGRAM(s) Oral every 6 hours PRN Temp greater or equal to 38C (100.4F)  glucagon  Injectable 1 milliGRAM(s) IntraMuscular once PRN Glucose LESS THAN 70 milligrams/deciliter      ALLERGIES:  Allergies    No Known Allergies    Intolerances        OBJECTIVE:  ICU Vital Signs Last 24 Hrs  T(C): 37 (04 Mar 2020 09:00), Max: 37 (04 Mar 2020 09:00)  T(F): 98.6 (04 Mar 2020 09:00), Max: 98.6 (04 Mar 2020 09:00)  HR: 102 (04 Mar 2020 11:00) (66 - 126)  BP: 158/73 (04 Mar 2020 11:00) (81/54 - 200/99)  BP(mean): 93 (04 Mar 2020 11:00) (59 - 136)  ABP: --  ABP(mean): --  RR: 23 (04 Mar 2020 11:00) (14 - 54)  SpO2: 96% (04 Mar 2020 11:00) (92% - 100%)    Mode: AC/ CMV (Assist Control/ Continuous Mandatory Ventilation)  RR (machine): 16  TV (machine): 450  FiO2: 60  PEEP: 7.5    Adult Advanced Hemodynamics Last 24 Hrs  CVP(mm Hg): --  CVP(cm H2O): --  CO: --  CI: --  PA: --  PA(mean): --  PCWP: --  SVR: --  SVRI: --  PVR: --  PVRI: --  CAPILLARY BLOOD GLUCOSE      POCT Blood Glucose.: 141 mg/dL (04 Mar 2020 12:15)  POCT Blood Glucose.: 165 mg/dL (04 Mar 2020 08:23)  POCT Blood Glucose.: 200 mg/dL (04 Mar 2020 06:24)  POCT Blood Glucose.: 219 mg/dL (04 Mar 2020 05:27)  POCT Blood Glucose.: 314 mg/dL (04 Mar 2020 03:49)  POCT Blood Glucose.: 295 mg/dL (04 Mar 2020 01:47)  POCT Blood Glucose.: 180 mg/dL (03 Mar 2020 23:41)  POCT Blood Glucose.: 156 mg/dL (03 Mar 2020 22:05)  POCT Blood Glucose.: 100 mg/dL (03 Mar 2020 21:18)  POCT Blood Glucose.: 85 mg/dL (03 Mar 2020 20:24)  POCT Blood Glucose.: 131 mg/dL (03 Mar 2020 19:04)  POCT Blood Glucose.: 206 mg/dL (03 Mar 2020 17:42)  POCT Blood Glucose.: 233 mg/dL (03 Mar 2020 16:44)  POCT Blood Glucose.: 295 mg/dL (03 Mar 2020 15:29)    CAPILLARY BLOOD GLUCOSE      POCT Blood Glucose.: 141 mg/dL (04 Mar 2020 12:15)    I&O's Summary    03 Mar 2020 07:01  -  04 Mar 2020 07:00  --------------------------------------------------------  IN: 2345.6 mL / OUT: 1005 mL / NET: 1340.6 mL    04 Mar 2020 07:01  -  04 Mar 2020 14:52  --------------------------------------------------------  IN: 72.5 mL / OUT: 1085 mL / NET: -1012.5 mL      Daily     Daily Weight in k.3 (04 Mar 2020 05:00)    PHYSICAL EXAMINATION:  General: sedated  HEENT: PERRLA  Neurology: sedated, does not follow commands  Respiratory: b/l breath sounds  CV: tachy  Abdominal: Soft, ND  Extremities: peripheral edema all extremities  Incisions:   Tubes: intubated    LABS:  ABG - ( 04 Mar 2020 08:57 )  pH, Arterial: 7.45  pH, Blood: x     /  pCO2: 42    /  pO2: 63    / HCO3: 29    / Base Excess: 4.4   /  SaO2: 93                                      8.7    4.03  )-----------( 48       ( 04 Mar 2020 04:00 )             27.2     03-04    139  |  100  |  34<H>  ----------------------------<  286<H>  3.6   |  29  |  0.5<L>    Ca    7.3<L>      04 Mar 2020 04:00  Mg     1.5         TPro  4.5<L>  /  Alb  1.8<L>  /  TBili  1.6<H>  /  DBili  x   /  AST  71<H>  /  ALT  74<H>  /  AlkPhos  281<H>      LIVER FUNCTIONS - ( 04 Mar 2020 04:00 )  Alb: 1.8 g/dL / Pro: 4.5 g/dL / ALK PHOS: 281 U/L / ALT: 74 U/L / AST: 71 U/L / GGT: x                       TELEMETRY:     EKG:     IMAGING:

## 2020-03-05 NOTE — PROCEDURAL SAFETY CHECKLIST WITH OR WITHOUT SEDATION - NSPOSTDEBRIEFDT_GEN_ALL_CORE
04-Feb-2020 11:58
27-Feb-2020 14:30
05-Mar-2020 11:30
05-Mar-2020 16:30
26-Feb-2020 13:30
05-Feb-2020 21:00
16-Feb-2020 09:00
18-Feb-2020 12:00
26-Feb-2020 14:45
01-Feb-2020 13:45

## 2020-03-05 NOTE — CHART NOTE - NSCHARTNOTEFT_GEN_A_CORE
Patient was hypotensive at night and was started on the Levophed drip at the low dose. Patient started to breath over the vent and was found to be hypoxic SaO2 to 85 %. FiO2 was gradually increased in steps from 60% to 100% over the night. IV pushes of Versed 2mg were given x 3 without any significant improvement. Patient was finally started on Versed drip and RN was directed to wean the patient off precedex drip.

## 2020-03-05 NOTE — PROCEDURAL SAFETY CHECKLIST WITH OR WITHOUT SEDATION - NSBEDADDLTIME7_GEN_ALL_CORE
not applicable
yes
not applicable

## 2020-03-05 NOTE — PROGRESS NOTE ADULT - ASSESSMENT
74y female with PMH of asthma, COPD not on home O2, autoimmune hepatitis on prednisone and tacrolimus, heterozygous prothrombin gene mutation with hx of PE and DVT on coumadin presents to the hospital complaining of cough, hemoptysis. Found to have viral pneumonia with respiratory failure and intubated. Extubated after prolonged course but required re-intubation 2/26 for worsening respiratory status and received IVC filter 2/26 because pt not safe for AC (alveolar hemorrhage).  As per palliative, Pt's family will continue treatment course and may possibly pursue trach for now.      #) Diffuse Alveoloar hemorrhage  - completed Zyvox, Levaquin, Cefepime course, and oseltamivir   - C/w Duoneb   - Solumedrol IV 40mg q24hr  - Bronch results - neg culture, neg fungal, cytology positive for inflammatory cells, no organisms  - Bronch 2/16 showed bleeding from DAH  - Bronch 2/26 for BAL, cultures show gram - rods, start meropenem 2/27    #) Tachycardia  - start cardizem 60 mg q8  - IV 10 metoprolol pushes if persistent tachy    #) Candidemia, secondary to central line  - cultures neg since 2/14  - oral fluconazole 14 days, switched to caspofungin for rising LFT (2/26), completed 2/27  - ID following  - repeat fungitell 3/4    #) Rectal Bleeding   - GI following   - received 1U of blood 2/23, hemoglobin stable  - Protonix 40mg PO qd   - FlexSig showed 2 rectal ulcers one clean one crater, s/p clipping  - CBC q24  - Avoid Dignshield or rectal tube   - s/p sigmoidoscopy recurrent rectal bleeding possibly from diverticulosis 2/28, received 2U pRBC,   - will need CT angio if bleeding again, f/u CBC    #) 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  - duplex shows DVT consistent with prior  - not candidate for AC at this time due to alveolar hemorrhage   - IVC filter placed 2/26    #Immunosuppressed: Autoimmune Hepatitis   - solumedrol 60 mg qd  - holding home prednisone 60 mg PO qd   - CMV PCR neg this admission   - f/u repeat tarcolimus level    #HTN  - Continue amlodipine 10 qd, Lopressor 50 BID    #Heterozygous prothrombin gene mutation with hx of PE and DVT on coumadin  - holding coumadin for now due to alveolar hemorrhage   - monitor coags    #0.5 cm of GB polyp  - repeat US abdomen in 6 months per GI    #Hx of asthma  - C/w montelukast qd    #Deconditioning   - pt severely deconditioned due to prolonged hospital stay  - PT/Rehab     #) MISC  Activity: intubated  DVT ppx: IVC filter  GI ppx: protonix  Diet: tube feeding  Code: Full  Dispo: medical optimization  CHG 74y female with PMH of asthma, COPD not on home O2, autoimmune hepatitis on prednisone and tacrolimus, heterozygous prothrombin gene mutation with hx of PE and DVT on coumadin presents to the hospital complaining of cough, hemoptysis. Found to have viral pneumonia with respiratory failure and intubated. Extubated after prolonged course but required re-intubation 2/26 for worsening respiratory status and received IVC filter 2/26 because pt not safe for AC (alveolar hemorrhage).  As per palliative, Pt's family will continue treatment course and may possibly pursue trach for now.      #) Diffuse Alveoloar hemorrhage  - completed Zyvox, Levaquin, Cefepime course, and oseltamivir   - C/w Duoneb   - Solumedrol IV 40mg q24hr  - Bronch results - neg culture, neg fungal, cytology positive for inflammatory cells, no organisms  - Bronch 2/16 showed bleeding from DAH  - Bronch 2/26 for BAL, cultures show gram - rods, start meropenem 2/27    #Opacities Lung  - bronchoscopy 3/5 for new right upper opacity, bronchial aspirate sent, f/u cultures  - start vancomycin, continue meropenem    #) Tachycardia  - start cardizem 60 mg q8  - IV 10 metoprolol pushes if persistent tachy    #) Candidemia, secondary to central line  - cultures neg since 2/14  - oral fluconazole 14 days, switched to caspofungin for rising LFT (2/26), completed 2/27  - ID following  - repeat fungitell 3/4    #) Rectal Bleeding   - GI following   - received 1U of blood 2/23, hemoglobin stable  - Protonix 40mg PO qd   - FlexSig showed 2 rectal ulcers one clean one crater, s/p clipping  - CBC q24  - Avoid Dignshield or rectal tube   - s/p sigmoidoscopy recurrent rectal bleeding possibly from diverticulosis 2/28, received 2U pRBC,   - will need CT angio if bleeding again, f/u CBC    #) 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  - duplex shows DVT consistent with prior  - not candidate for AC at this time due to alveolar hemorrhage   - IVC filter placed 2/26    #Immunosuppressed: Autoimmune Hepatitis   - solumedrol 60 mg qd  - holding home prednisone 60 mg PO qd   - CMV PCR neg this admission   - f/u repeat tarcolimus level    #HTN  - Continue amlodipine 10 qd, Lopressor 50 BID    #Heterozygous prothrombin gene mutation with hx of PE and DVT on coumadin  - holding coumadin for now due to alveolar hemorrhage   - monitor coags    #0.5 cm of GB polyp  - repeat US abdomen in 6 months per GI    #Hx of asthma  - C/w montelukast qd    #Deconditioning   - pt severely deconditioned due to prolonged hospital stay  - PT/Rehab     #) MISC  Activity: intubated  DVT ppx: IVC filter  GI ppx: protonix  Diet: tube feeding  Code: Full  Dispo: medical optimization  CHG 74y female with PMH of asthma, COPD not on home O2, autoimmune hepatitis on prednisone and tacrolimus, heterozygous prothrombin gene mutation with hx of PE and DVT on coumadin presents to the hospital complaining of cough, hemoptysis. Found to have viral pneumonia with respiratory failure and intubated. Extubated after prolonged course but required re-intubation 2/26 for worsening respiratory status and received IVC filter 2/26 because pt not safe for AC (alveolar hemorrhage).  As per palliative, Pt's family will continue treatment course and may possibly pursue trach for now.      #) Diffuse Alveoloar hemorrhage  - completed Zyvox, Levaquin, Cefepime course, and oseltamivir   - C/w Duoneb   - Solumedrol IV 40mg q24hr  - Bronch results - neg culture, neg fungal, cytology positive for inflammatory cells, no organisms  - Bronch 2/16 showed bleeding from DAH  - Bronch 2/26 for BAL, cultures show gram - rods, start meropenem 2/27    #Opacities Lung  - bronchoscopy 3/5 for new right upper opacity, bronchial aspirate sent, f/u cultures  - start vancomycin, continue meropenem    #) Tachycardia  - start cardizem 60 mg q8  - IV 10 metoprolol pushes if persistent tachy    #) Candidemia, secondary to central line  - cultures neg since 2/14  - oral fluconazole 14 days, switched to caspofungin for rising LFT (2/26), completed 2/27  - ID following  - repeat fungitell 3/4, continue caspofungin    #) Rectal Bleeding   - GI following   - received 1U of blood 2/23, hemoglobin stable  - Protonix 40mg PO qd   - FlexSig showed 2 rectal ulcers one clean one crater, s/p clipping  - CBC q24  - Avoid Dignshield or rectal tube   - s/p sigmoidoscopy recurrent rectal bleeding possibly from diverticulosis 2/28, received 2U pRBC,   - will need CT angio if bleeding again, f/u CBC    #) 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  - duplex shows DVT consistent with prior  - not candidate for AC at this time due to alveolar hemorrhage   - IVC filter placed 2/26    #Immunosuppressed: Autoimmune Hepatitis   - solumedrol 60 mg qd  - holding home prednisone 60 mg PO qd   - CMV PCR neg this admission   - f/u repeat tarcolimus level    #HTN  - Continue amlodipine 10 qd, Lopressor 50 BID    #Heterozygous prothrombin gene mutation with hx of PE and DVT on coumadin  - holding coumadin for now due to alveolar hemorrhage   - monitor coags    #0.5 cm of GB polyp  - repeat US abdomen in 6 months per GI    #Hx of asthma  - C/w montelukast qd    #Deconditioning   - pt severely deconditioned due to prolonged hospital stay  - PT/Rehab     #) MISC  Activity: intubated  DVT ppx: IVC filter  GI ppx: protonix  Diet: tube feeding  Code: Full  Dispo: medical optimization  CHG 74y female with PMH of asthma, COPD not on home O2, autoimmune hepatitis on prednisone and tacrolimus, heterozygous prothrombin gene mutation with hx of PE and DVT on coumadin presents to the hospital complaining of cough, hemoptysis. Found to have viral pneumonia with respiratory failure and intubated. Extubated after prolonged course but required re-intubation 2/26 for worsening respiratory status and received IVC filter 2/26 because pt not safe for AC (alveolar hemorrhage).  As per palliative, Pt's family will continue treatment course and may possibly pursue trach for now.      #) Diffuse Alveoloar hemorrhage  - completed Zyvox, Levaquin, Cefepime course, and oseltamivir   - C/w Duoneb   - Solumedrol IV 40mg q24hr  - Bronch results - neg culture, neg fungal, cytology positive for inflammatory cells, no organisms  - Bronch 2/16 showed bleeding from DAH  - Bronch 2/26 for BAL, cultures show gram - rods, start meropenem 2/27    #Opacities Lung  - wbc increased from 4.03 (3/4) to 9.81 (3/5)  - bronchoscopy 3/5 for new right upper opacity on cxr, bronchial aspirate sent, f/u cultures  - start vancomycin, continue meropenem    #) Tachycardia  - start cardizem 60 mg q8  - IV 10 metoprolol pushes if persistent tachy    #) Candidemia, secondary to central line  - cultures neg since 2/14  - oral fluconazole 14 days, switched to caspofungin for rising LFT (2/26), completed 2/27  - ID following  - repeat fungitell 3/4, continue caspofungin    #) Rectal Bleeding   - GI following   - received 1U of blood 2/23, hemoglobin stable  - Protonix 40mg PO qd   - FlexSig showed 2 rectal ulcers one clean one crater, s/p clipping  - CBC q24  - Avoid Dignshield or rectal tube   - s/p sigmoidoscopy recurrent rectal bleeding possibly from diverticulosis 2/28, received 2U pRBC,   - will need CT angio if bleeding again, f/u CBC    #) 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  - duplex shows DVT consistent with prior  - not candidate for AC at this time due to alveolar hemorrhage   - IVC filter placed 2/26    #Immunosuppressed: Autoimmune Hepatitis   - solumedrol 60 mg qd  - holding home prednisone 60 mg PO qd   - CMV PCR neg this admission   - f/u repeat tarcolimus level    #HTN  - Continue amlodipine 10 qd, Lopressor 50 BID    #Heterozygous prothrombin gene mutation with hx of PE and DVT on coumadin  - holding coumadin for now due to alveolar hemorrhage   - monitor coags    #0.5 cm of GB polyp  - repeat US abdomen in 6 months per GI    #Hx of asthma  - C/w montelukast qd    #Deconditioning   - pt severely deconditioned due to prolonged hospital stay  - PT/Rehab     #) MISC  Activity: intubated  DVT ppx: IVC filter  GI ppx: protonix  Diet: tube feeding  Code: Full  Dispo: medical optimization  CHG 74y female with PMH of asthma, COPD not on home O2, autoimmune hepatitis on prednisone and tacrolimus, heterozygous prothrombin gene mutation with hx of PE and DVT on coumadin presents to the hospital complaining of cough, hemoptysis. Found to have viral pneumonia with respiratory failure and intubated. Extubated after prolonged course but required re-intubation 2/26 for worsening respiratory status and received IVC filter 2/26 because pt not safe for AC (alveolar hemorrhage).  As per palliative, Pt's family will continue treatment course and may possibly pursue trach for now.      #) Diffuse Alveoloar hemorrhage  - completed Zyvox, Levaquin, Cefepime course, and oseltamivir   - C/w Duoneb   - Solumedrol IV 40mg q24hr  - Bronch results - neg culture, neg fungal, cytology positive for inflammatory cells, no organisms  - Bronch 2/16 showed bleeding from DAH  - Bronch 2/26 for BAL, cultures show gram - rods, start meropenem 2/27    #) Opacities Lung  - wbc increased from 4.03 (3/4) to 9.81 (3/5)  - bronchoscopy 3/5 for new right upper opacity on cxr, bronchial aspirate sent, f/u cultures  - start vancomycin, continue meropenem  - 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    #) Tachycardia  - start cardizem 60 mg q8  - IV 10 metoprolol pushes if persistent tachy    #) Candidemia, secondary to central line  - cultures neg since 2/14  - oral fluconazole 14 days, switched to caspofungin for rising LFT (2/26), completed 2/27  - ID following  - repeat fungitell 3/4, continue caspofungin    #) Rectal Bleeding   - GI following   - received 1U of blood 2/23, hemoglobin stable  - Protonix 40mg PO qd   - FlexSig showed 2 rectal ulcers one clean one crater, s/p clipping  - CBC q24  - Avoid Dignshield or rectal tube   - s/p sigmoidoscopy recurrent rectal bleeding possibly from diverticulosis 2/28, received 2U pRBC,   - will need CT angio if bleeding again, f/u CBC    #) 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  - duplex shows DVT consistent with prior  - not candidate for AC at this time due to alveolar hemorrhage   - IVC filter placed 2/26    #Immunosuppressed: Autoimmune Hepatitis   - solumedrol 60 mg qd  - holding home prednisone 60 mg PO qd   - CMV PCR neg this admission   - f/u repeat tarcolimus level    #HTN  - Continue amlodipine 10 qd, Lopressor 50 BID    #Heterozygous prothrombin gene mutation with hx of PE and DVT on coumadin  - holding coumadin for now due to alveolar hemorrhage   - monitor coags    #0.5 cm of GB polyp  - repeat US abdomen in 6 months per GI    #Hx of asthma  - C/w montelukast qd    #Deconditioning   - pt severely deconditioned due to prolonged hospital stay  - PT/Rehab     #) MISC  Activity: intubated  DVT ppx: IVC filter  GI ppx: protonix  Diet: tube feeding  Code: Full  Dispo: medical optimization  CHG 74y female with PMH of asthma, COPD not on home O2, autoimmune hepatitis on prednisone and tacrolimus, heterozygous prothrombin gene mutation with hx of PE and DVT on coumadin presents to the hospital complaining of cough, hemoptysis. Found to have viral pneumonia with respiratory failure and intubated. Extubated after prolonged course but required re-intubation 2/26 for worsening respiratory status and received IVC filter 2/26 because pt not safe for AC (alveolar hemorrhage).  As per palliative, Pt's family will continue treatment course and may possibly pursue trach for now.      #) Diffuse Alveoloar hemorrhage  - completed Zyvox, Levaquin, Cefepime course, and oseltamivir   - C/w Duoneb   - Solumedrol IV 40mg q24hr  - Bronch results - neg culture, neg fungal, cytology positive for inflammatory cells, no organisms  - Bronch 2/16 showed bleeding from DAH  - Bronch 2/26 for BAL, cultures show gram - rods, start meropenem 2/27    #) Opacities Lung  - wbc increased from 4.03 (3/4) to 9.81 (3/5)  - bronchoscopy 3/5 for new right upper opacity on cxr, bronchial aspirate sent, f/u cultures  - start vancomycin, continue meropenem, f/u vanc levels before 4th dose  - 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    #) Tachycardia  - start cardizem 60 mg q8  - IV 10 metoprolol pushes if persistent tachy    #) Candidemia, secondary to central line  - cultures neg since 2/14  - oral fluconazole 14 days, switched to caspofungin for rising LFT (2/26), completed 2/27  - ID following  - repeat fungitell 3/4, continue caspofungin    #) Rectal Bleeding   - GI following   - received 1U of blood 2/23, hemoglobin stable  - Protonix 40mg PO qd   - FlexSig showed 2 rectal ulcers one clean one crater, s/p clipping  - CBC q24  - Avoid Dignshield or rectal tube   - s/p sigmoidoscopy recurrent rectal bleeding possibly from diverticulosis 2/28, received 2U pRBC,   - will need CT angio if bleeding again, f/u CBC    #) 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  - duplex shows DVT consistent with prior  - not candidate for AC at this time due to alveolar hemorrhage   - IVC filter placed 2/26    #Immunosuppressed: Autoimmune Hepatitis   - solumedrol 60 mg qd  - holding home prednisone 60 mg PO qd   - CMV PCR neg this admission   - f/u repeat tarcolimus level    #HTN  - Continue amlodipine 10 qd, Lopressor 50 BID    #Heterozygous prothrombin gene mutation with hx of PE and DVT on coumadin  - holding coumadin for now due to alveolar hemorrhage   - monitor coags    #0.5 cm of GB polyp  - repeat US abdomen in 6 months per GI    #Hx of asthma  - C/w montelukast qd    #Deconditioning   - pt severely deconditioned due to prolonged hospital stay  - PT/Rehab     #) MISC  Activity: intubated  DVT ppx: IVC filter  GI ppx: protonix  Diet: tube feeding  Code: Full  Dispo: medical optimization  CHG 74y female with PMH of asthma, COPD not on home O2, autoimmune hepatitis on prednisone and tacrolimus, heterozygous prothrombin gene mutation with hx of PE and DVT on coumadin presents to the hospital complaining of cough, hemoptysis. Found to have viral pneumonia with respiratory failure and intubated. Extubated after prolonged course but required re-intubation 2/26 for worsening respiratory status and received IVC filter 2/26 because pt not safe for AC (alveolar hemorrhage).  As per palliative, Pt's family will continue treatment course and may possibly pursue trach for now.      #) Diffuse Alveoloar hemorrhage  - completed Zyvox, Levaquin, Cefepime course, and oseltamivir   - C/w Duoneb   - Solumedrol IV 40mg q24hr  - Bronch results - neg culture, neg fungal, cytology positive for inflammatory cells, no organisms  - Bronch 2/16 showed bleeding from DAH  - Bronch 2/26 for BAL, cultures show gram - rods, start meropenem 2/27    #) Opacities Lung  - wbc increased from 4.03 (3/4) to 9.81 (3/5)  - bronchoscopy 3/5 for new right upper opacity on cxr, bronchial aspirate sent, f/u cultures  - start vancomycin, continue meropenem, f/u vanc levels before 30 mins before 4th dose (3/6 at 5:30 PM)  - 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    #) Tachycardia  - start cardizem 60 mg q8  - IV 10 metoprolol pushes if persistent tachy    #) Candidemia, secondary to central line  - cultures neg since 2/14  - oral fluconazole 14 days, switched to caspofungin for rising LFT (2/26), completed 2/27  - ID following  - repeat fungitell 3/4, continue caspofungin    #) Rectal Bleeding   - GI following   - received 1U of blood 2/23, hemoglobin stable  - Protonix 40mg PO qd   - FlexSig showed 2 rectal ulcers one clean one crater, s/p clipping  - CBC q24  - Avoid Dignshield or rectal tube   - s/p sigmoidoscopy recurrent rectal bleeding possibly from diverticulosis 2/28, received 2U pRBC,   - will need CT angio if bleeding again, f/u CBC    #) 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  - duplex shows DVT consistent with prior  - not candidate for AC at this time due to alveolar hemorrhage   - IVC filter placed 2/26    #Immunosuppressed: Autoimmune Hepatitis   - solumedrol 60 mg qd  - holding home prednisone 60 mg PO qd   - CMV PCR neg this admission   - f/u repeat tarcolimus level    #HTN  - Continue amlodipine 10 qd, Lopressor 50 BID    #Heterozygous prothrombin gene mutation with hx of PE and DVT on coumadin  - holding coumadin for now due to alveolar hemorrhage   - monitor coags    #0.5 cm of GB polyp  - repeat US abdomen in 6 months per GI    #Hx of asthma  - C/w montelukast qd    #Deconditioning   - pt severely deconditioned due to prolonged hospital stay  - PT/Rehab     #) MISC  Activity: intubated  DVT ppx: IVC filter  GI ppx: protonix  Diet: tube feeding  Code: Full  Dispo: medical optimization  CHG

## 2020-03-05 NOTE — PROGRESS NOTE ADULT - ASSESSMENT
IMPRESSION:    Acute hypoxic respiratory failure likely VTE SP GFF   Klebsiella in BA treated   DAH resolving  Candidemia SP therapy   HO Autoimmune hepatitis on tacrolimus and solumedrol  Possible recurrent septic shock      PLAN:    CNS:  Keep sedated for now.  One dose paralysis     HEENT: ET care.  Oral care.      PULMONARY:  HOB @ 45 degrees. Solumedrol 60 mg q24h.  I:E 1.  Urgent bronchoscopy       CARDIOVASCULAR:  I=O.  500 LR bolus.      GI: GI prophylaxis.  OG feeding.      RENAL:  Follow up lytes. Replete as needed.     INFECTIOUS DISEASE: Follow up cultures.  Repeat cultures.  Restart Caspofungin, Vanc and Meropenem    HEMATOLOGICAL:  DVT prophylaxis.  FU CBC and coags     ENDOCRINE:  Follow up FS.  Insulin protocol if needed.    MUSCULOSKELETAL: Bed rest     Prognosis: Very Poor prognosis overall

## 2020-03-05 NOTE — CHART NOTE - NSCHARTNOTEFT_GEN_A_CORE
Registered Dietitian Follow-Up    ***Scroll to the bottom for RD recommendation***    Patient Profile Reviewed                           Yes [x]   No []  Nutrition History Previously Obtained        Yes [x]  No []          PERTINENT SUBJECTIVE INFORMATION (LATEST AS OF TODAY):  - No family at bedside.   - Pt continues to be intubated to ventilator. s/p Bronchoscopy this morning.    - On Pressor, fentanyl, versed. Ve 13.1, BP 98/67, MAP 68.,   HgbA1c is 6.8, no noted Hx of DM2.   - Pt has been on Osmolite 1.5 at 40cc/hr (NGT) + Prosource TF x3 (since 3/2) - which is what RD recommended.         PERTINENT MEDICAL INFORMATIONS:   (1) p/w SOB+ desat. Pt was hypotensive and hypoxic and agitated, started versed and levophed.  (2) Acute hypoxic resp/ ARDS.   (3) Diffuse alveolar hemorrhage c/w duoneb. S/p bronch.  (4) Tachy, started meds. ID following for candidemia. GI following for rectal bleeding.  (5) S/p Sigmoidoscopy for possible diverticulosis 2/28.   (5) Deconditoning - prolonged hospital stay.  (6) PT/Rehab.           DIET ORDER:  Osmolite 1.5 at 40cc/hr (NGT) + Prosource TF x3 (since 3/2) = 1540 kcal/ 93g protein        ANTHROPOMETRICS:  - Ht.  162.6cm  - Wt.   (2/1): 74.8kg   (2/2): 74.3kg  (2/3): 81.5kg  (2/4): 78.4kg  (2/8): 87.7kg  (2/10): 86.7kg  (2/16): 86kg   (2/19): 87.9kg  (2/21): 81.5kg  (2/24): 74.5kg  (2/27): 71.3kg  (2/26): 72.2kg  (3/5): 71.9kg - pt has edema ongoing, and weight appears to be trending downward. It is likely that pt is losing weight. will monitor  - BMI. 28- 30.8  - IBW. 54.5kg       PERTINENT LAB DATA:  3/5: h/h 9.5/30.1, K 3.4, BUN 39, Cr <0.5, glucose 171, ca 7.3, albumin 1.8, LFT 69, Mag 1.5  PERTINENT MEDS:   abx, d5w, insulin, fentanyl, levophed, methylprednisolone, versed, protonix, b12      PHYSICAL FINDINGS  - APPEARANCE:        intubated to ventilator, 3+ generalized edema  - GI FUNCTION:       last noted BM was 2/26, a small one noted by RN 3/1, no new BM noted.  - TUBES:                     NGT  - ORAL/MOUTH:      NPO, failed SLP 2/24  - SKIN:                        ecchymosis        NUTRITION REQUIREMENTS  WEIGHT USED:                          Ht: 162.6cm, Wt: 74.8kg (more likely pt's wt PTA, BMI: 28.5)  ESTIMATED ENERGY NEEDS:       CONTINUE [  ]      ADJUST [ x ]  - intubated since 2/27    ESTIMATED ENERGY NEEDS:         1571 kcal/day ([id number])  -- was 1664  ESTIMATED PROTEIN NEEDS:         g/day (1.2-1.4 g/kg of ABW) - improved renal status, aim higher now, will monitor  ESTIMATED FLUID NEEDS:             fluid per ICU team    CURRENT NUTRIENT NEEDS:    1540/ 93g protein at this time          [  x] PREVIOUS NUTRITION DIAGNOSIS:    (1) Inadequate energy intake  - IMPROVING            [x  ] ONGOING        [  ] RESOLVED          PATIENT INTERVENTION:    [  ] ORAL        [ x] EN/TF     GOAL/EXPECTED OUTCOME:     pt continues to meet and tolerate >85% of estimated kcal/protein via TF upon f/u in 4 days.   INDICATOR/MONITORING:       RD to monitor diet order, energy intake, body composition, nutrition focused physical findings (EN tolerance, s/s, renal profile)  NUTRITION INTERVENTION:        Enteral nutrition      RECS: (1) CONTINUE OSMOLITE 1.5 at 40cc/hr with No-Carb prosource q8hr (total 3 packs/day). This regimen at GOAL gives a total of 1540 kcal/ 93g protein/ 730mL free water, additional flushes per ICU team. (2) Insulin regimen as needed. HgbA1c 6.8.

## 2020-03-05 NOTE — PROGRESS NOTE ADULT - ASSESSMENT
73 y/o female with pmhx of asthma, HTN,  autoimmune hepatitis, heterozygous prothrombin gene mutation with hx of PE and DVT on coumadin admitted for SOB     Acute hypoxic resp failure with hypoxia due to influenza, HCAP  Septic shock in immunocompromised patient, Severe sepsis present on admission   - completed course of treatment- antibiotics and antiviral completed for initial infection  - MRSA VAP treated with Zyvox for 10 days   - remains intubated and sedated  - PE, HCAP pneumonia, GNR on BAL : Klebsiella  - Septic again, back on pressors  - GFF placed due to PE   - remains on IV Solu-medrol  - ID f/u noted, remains on Meropenem  - await culture results collected today on Bronch    Multiple Skin tears/ wounds  - local care  - elevate extremities  - burn evaluation    Candedemia  - treated  - cultures negative x2  - 2D echo no evidence of vegetation   - ophth evaluation noted, no clinical evidence, low suspicion for ocular fungemia/endogenous endophthalmitis   - completed treatment  2/27    REJI on CKD 3  - creatinine normal  - urine output noted  - continue to monitor    Hypokalemia  - supplement    Acute Blood Loss Anemia  - had rectal tube earlier on this adm which was discontinued due to ulcer  - on Protonix  - has had multiple PRBC's this adm  - no further evidence of rectal bleeding    Autoimmune hepatitis  - On prednisone 60 mg PO qd and Tacrolimus at home  - now on Solu-medrol  - Tacrolimus held    HTN by hx  - now off Labetolol    Heterozygous prothrombin gene mutation with hx of PE and DVT on coumadin:  - Coumadin had been held for alveolar hemorrhage     Protein Malnutrition  - Alb noted, trending back up  - tolerating enteral feedings  - monitor    Diet: Enteral feedings   GI PPx: Protonix  DVT PPx: sequentials  Activity: bedrest  Dispo: readmitted to hospital from  rehab at [facility], remains ICU      Continue supportive measures, patient remains acutely ill,  Overall prognosis poor. Case discussed with  and son, told of day to day condition.  states he will make decision soon. He is aware of his wife's wishes

## 2020-03-05 NOTE — PROGRESS NOTE ADULT - SUBJECTIVE AND OBJECTIVE BOX
ELDER, DONI  75y  Female  HPI:  75y/o F w/ hx of asthma, COPD not on home O2, autoimmune hepatitis on prednisone and tacrolimus, heterozygous prothrombin gene mutation with hx of PE and DVT on coumadin with recent hospitalization in January 2020 with a diagnosis of pneumonia presents for worsening SOB, hemoptysis and cough. Everything started yesterday night when patient was in bed, started to feel shortness of breath and had many episodes of hemoptysis with clots, she also had substernal chest pain non radiating, moderate in intensity that worsens on coughing. She denies fever but endorses chills. She also admits for lightheadedness that occurred yesterday, no HA, abd pain, dysuria or paresthesias. She had 2 loose bowel movements since yesterday with some blood in the stools that she attributes to her hemorrhoids. She stopped Coumadin couple of days ago since her INR was supratherapeutic. She is from [facility] short term and also mentions that her  and another family member have the flu and she was exposed to them. She did not have the flu shot this season.  In ED, temp was 100.1 rectally, tachycardic ( sinus) , she was saturating to 70s on non rebreather and her SaO2 went up to 95%. ABG showing respiratory alkalosis initially and then corrected after BIPAP placement and correction of RR.  CTA chest showing no PE but showing interval development of multifocal bilateral groundglass opacities as well as new moderate to severe bronchiectasis in the right lung. Findings are concerning for an acute infectious/inflammatory process. (31 Jan 2020 14:36)    MEDICATIONS  (STANDING):  ALBUTerol    90 MICROgram(s) HFA Inhaler 4 Puff(s) Inhalation every 6 hours  calcitriol  Solution 0.25 MICROGram(s) Oral daily  calcium carbonate    500 mG (Tums) Chewable 3 Tablet(s) Chew every 8 hours  caspofungin IVPB      chlorhexidine 0.12% Liquid 15 milliLiter(s) Oral Mucosa two times a day  chlorhexidine 4% Liquid 1 Application(s) Topical <User Schedule>  cyanocobalamin 1000 MICROGram(s) Oral daily  dexMEDEtomidine Infusion 0.5 MICROgram(s)/kG/Hr (9.025 mL/Hr) IV Continuous <Continuous>  dextrose 5%. 1000 milliLiter(s) (50 mL/Hr) IV Continuous <Continuous>  fentaNYL   Infusion. 0.5 MICROgram(s)/kG/Hr (3.61 mL/Hr) IV Continuous <Continuous>  gabapentin 300 milliGRAM(s) Oral at bedtime  influenza   Vaccine 0.5 milliLiter(s) IntraMuscular once  insulin regular Infusion 1 Unit(s)/Hr (1 mL/Hr) IV Continuous <Continuous>  ipratropium 17 MICROgram(s) HFA Inhaler 4 Puff(s) Inhalation every 6 hours  lactated ringers Bolus 500 milliLiter(s) IV Bolus once  meropenem  IVPB      meropenem  IVPB 1000 milliGRAM(s) IV Intermittent every 8 hours  midazolam Infusion 0.02 mG/kG/Hr (1.444 mL/Hr) IV Continuous <Continuous>  multivitamin/minerals 1 Tablet(s) Oral daily  mupirocin 2% Ointment 1 Application(s) Topical two times a day  norepinephrine Infusion 0.05 MICROgram(s)/kG/Min (3.384 mL/Hr) IV Continuous <Continuous>  pantoprazole   Suspension 40 milliGRAM(s) Oral before breakfast  vancomycin  IVPB 750 milliGRAM(s) IV Intermittent every 12 hours    MEDICATIONS  (PRN):  acetaminophen   Tablet .. 650 milliGRAM(s) Oral every 6 hours PRN Temp greater or equal to 38C (100.4F)  glucagon  Injectable 1 milliGRAM(s) IntraMuscular once PRN Glucose LESS THAN 70 milligrams/deciliter    INTERVAL EVENTS: Patient seen today, events of past 24 hours noted. Patient seen after bronchoscopy done due to worsening CXR findings and increase need for FI2 and PEEP. Patient restarted on Levophed. Patient's  and son at bedside.    T(C): 36.8 (03-05-20 @ 20:00), Max: 37 (03-05-20 @ 12:00)  HR: 96 (03-05-20 @ 22:00) (90 - 132)  BP: 99/60 (03-05-20 @ 16:00) (44/32 - 183/94)  RR: 22 (03-05-20 @ 22:00) (18 - 58)  SpO2: 97% (03-05-20 @ 22:00) (86% - 100%)  Wt(kg): --Vital Signs Last 24 Hrs  T(C): 36.8 (05 Mar 2020 20:00), Max: 37 (05 Mar 2020 12:00)  T(F): 98.3 (05 Mar 2020 20:00), Max: 98.6 (05 Mar 2020 12:00)  HR: 96 (05 Mar 2020 22:00) (90 - 132)  BP: 99/60 (05 Mar 2020 16:00) (44/32 - 183/94)  BP(mean): 68 (05 Mar 2020 16:00) (35 - 136)  RR: 22 (05 Mar 2020 22:00) (18 - 58)  SpO2: 97% (05 Mar 2020 22:00) (86% - 100%)    PHYSICAL EXAM:  GENERAL: Sedated , Intubated  CHEST/LUNG: [place] BS  HEART: S1, S2, Regular rate and rhythm  ABDOMEN: Soft, Nondistended; Bowel sounds present  EXTREMITIES: ++ edema  SKIN: Skin lesions covered    LABS:                        9.5    9.81  )-----------( 68       ( 05 Mar 2020 04:30 )             30.1             03-05    141  |  100  |  39<H>  ----------------------------<  171<H>  3.4<L>   |  29  |  <0.5<L>    Ca    7.3<L>      05 Mar 2020 04:30  Mg     1.5     03-05    TPro  4.8<L>  /  Alb  1.8<L>  /  TBili  1.3<H>  /  DBili  x   /  AST  69<H>  /  ALT  82<H>  /  AlkPhos  383<H>  03-05    LIVER FUNCTIONS - ( 05 Mar 2020 04:30 )  Alb: 1.8 g/dL / Pro: 4.8 g/dL / ALK PHOS: 383 U/L / ALT: 82 U/L / AST: 69 U/L / GGT: x                       ABG - ( 05 Mar 2020 13:52 )  pH, Arterial: 7.32  pH, Blood: x     /  pCO2: 53    /  pO2: 71    / HCO3: 27    / Base Excess: 0.7   /  SaO2: 93          RADIOLOGY & ADDITIONAL TESTS:  < from: Xray Chest 1 View- PORTABLE-Routine (03.05.20 @ 05:15) >  Findings:    Support devices: Patient is intubated. Enteric catheter extends below the hemidiaphragm. Right-sided central venous catheter seen. Telemetry leads overlie the thorax.    Cardiac/mediastinum/hilum: Heart is enlarged.    Lung parenchyma/Pleura: Bilateral opacifications are seen with pleural-based plaques.    Skeleton/soft tissues: Unchanged.    Impression:      Cardiomegaly. Calcified fibrothorax with superimposed opacities.    Worsening.        < end of copied text >

## 2020-03-05 NOTE — PROGRESS NOTE ADULT - ASSESSMENT
ASSESSMENT  73y/o F w/ hx of asthma, COPD not on home O2, autoimmune hepatitis on prednisone and tacrolimus, heterozygous prothrombin gene mutation with hx of PE and DVT on coumadin with recent hospitalization in January 2020 with a diagnosis of pneumonia presents for worsening SOB, hemoptysis and cough.    IMPRESSION  #GNR PNA- BAL 2/26 with   Numerous Klebsiella pneumoniae (panS)    Fungal cx from bronch -   Rare Yeast likely colonization    Acute hypoxemia, intubated 2/26, possible PE given +DVTs s/p Option Elite IVC filter via Right IJ access.  #1/16 Bronch cx AFB + 1/2 specimens    Possible MAC, given CT findings and +bronch specimen, would qualify for treatment per guidelines  #GIB  #Thrombocytopenia    continues post stopping linezolid   #Hx recent MRSA VAP    2/14 tracheal cx   Moderate Methicillin resistant Staphylococcus aureus    completed 8 day course, noted to have thrombocytopenia on linezolid  #Candidemia Fungitell: >500 (02-11-20 @ 11:21) likely CLABSI    2/11 BCX +, 2/12 BCX +, 2/13 BCX (-) RIJ 2/11. Groin a-line 2/4- removed 2/13     Fungitell: <31 (02-04-20 @ 04:40), Fungitell: 49 (02-01-20 @ 11:17)    Ophtho- no endophthalmitis   #Flu + AH1, Alveolar hemorrhage, ?superimposed atypical PNA (imaging not really consistent with bacterial PNA). Recent bronch 1/20 negative for PCP, Fungal, etc, previous bronch cx with yeast (likely oral contaminant) since GMS neg    2/3 Bronch   No organisms seen, 2/3 cytopath Rare atypical cells, few ciliated bronchial cells, alveolar macrophages and inflammatory cells, mainly neutrophils.    Strep urine Ag neg, serum crypto Ag neg, CMV PCR undetectable, AFB neg    Quantiferon indeterminate    Lactate Dehydrogenase, Serum: 607 (02.03.20 @ 04:30)    Procalcitonin, Serum: 1.86:    CTA chest showing no PE but showing interval development of multifocal bilateral groundglass opacities as well as new moderate to severe bronchiectasis in the right lung,   1/13 CT b/l GGOs, compatible with intersitial edema      During last hospitalization: bronch cx bacterial NG, +yeast, no ID, neg legionella, + MRSA PCR    MRSA PCR Result.: Positive (02-01-20 @ 20:40)  #Severe sepsis on admission P>90, WBC 19, RR>20, lactic acidosis Lactate, Blood: 2.9 mmol/L (01-31-20 @ 09:25)    afebrile   #Elevated Alk ph, transaminitis  #LLE wound, not infection     12/7 WCX MSSA, Kleb, bacteroides    8/2019 MRSA in a wound   #Immunosuppressed: autoimmune hepatitis on prednisone and tacrolimus    RECOMMENDATIONS  - Relative leukocytosis, could repeat deep tracheal CX  - As worsening CXR, given hx of MRSA PNA, could add back Vanc 750mg q12h IV (avoid linezolid as thrombocytopenia)  - Please check vanc trough 30 min prior to 4th dose   - On Jorge 1g q8h IV  (D8 on abx 2/27-)   - f/u AFB from 1/15 ID,  (possible MAC vs other NTM)   - Repeat fungitell (yeast in bronch likely colonizer) pending, if elevated or pt with worsening hemodynamics, would add back Caspofungin IV (70mg x1, then 50mg daily)  - grave prognosis    Spectra [id number]

## 2020-03-05 NOTE — PHARMACOTHERAPY INTERVENTION NOTE - OUTCOME
accepted

## 2020-03-05 NOTE — CHART NOTE - NSCHARTNOTEFT_GEN_A_CORE
DATE OF PROCEDURE: 3/5/2020    PREOPERATIVE DIAGNOSIS: r/o DAH      PROCEDURE PERFORMED:  Bronchoscopy         Attending:dr michi cheney      CONSENT: After explaining the risk and benefit the consent was obtained from the        The patient had been previously intubated and was on mechanical ventilatory support. she was on sedation, which was continued and adjusted during the procedure. Her FiO2 was increased to 100% during the procedure. The  fiberoptic bronchoscope was introduced through an endotracheal tube adaptor and the tip of the endotracheal tube was noted to be in good position above the baljinder.   The Right tracheobronchial tree was inspected closely to the level of the subsegmental bronchi. All bronchi are patent with no endobronchial lesions and no mucosal lesions noted.   The Left tracheobronchial tree was also patent and intact with the mucosa normal   The bronchoscope was then introduced to the left upper lobe lobe and washings were taken from that area.  No bleeding identified  The procedure was completed and all samples were submitted for appropriate studies  After adequate clearing of secretions was accomplished, the bronchoscope was removed from the patient and the procedure was ended.   The patient tolerated the procedure well and there were no complications.

## 2020-03-05 NOTE — PROGRESS NOTE ADULT - SUBJECTIVE AND OBJECTIVE BOX
Patient is a 75y old  Female who presents with a chief complaint of SOB and desaturation (05 Mar 2020 06:55)    Over Night Events:  Remains critically ill on MV.  Increased O2 requirement overnight.  On Levophed.  Sedated 	      ROS:     CONSTITUTIONAL:   no fever   no chills.  no weight gain   no weight loss    EYES:   no discharge,   no pain  no redness,   no visual changes.    ENT:   Ears: no ear pain and no hearing problems.  Nose: no nasal congestion and no nasal drainage.  Mouth/Throat: no dysphagia,  no hoarseness and no throat pain.  Neck: no lumps, no pain, no stiffness and no swollen glands.     CARDIOVASCULAR:   as per HPI    RESPIRATORY:  as per HPI    GASTROINTESTINAL:   no abdominal pain,   no constipation,   no diarrhea,   no vomiting.    GENITOURINARY:  no dysuria,   no frequency,   no urgency  no hematuria.    MUSCULOSKELETAL:   no back pain,   no musculoskeletal pain,  no weakness.    SKIN:   no jaundice,   no lesions,   no pruritis,   no rashes.    NEURO:   SEDATED    PSYCHIATRIC:   SEDATED    no anxiety  no depression    ALLERGIC/IMMUNOLOGIC:   No active allergic or immunologic issues        PHYSICAL EXAM    ICU Vital Signs Last 24 Hrs  T(C): 36.4 (04 Mar 2020 20:00), Max: 37 (04 Mar 2020 09:00)  T(F): 97.6 (04 Mar 2020 20:00), Max: 98.6 (04 Mar 2020 09:00)  HR: 100 (05 Mar 2020 07:00) (96 - 136)  BP: 105/63 (05 Mar 2020 07:00) (67/53 - 200/99)  BP(mean): 74 (05 Mar 2020 07:00) (52 - 122)  ABP: --  ABP(mean): --  RR: 20 (05 Mar 2020 07:00) (18 - 64)  SpO2: 94% (05 Mar 2020 07:00) (86% - 99%)      CONSTITUTIONAL:  Well nourished.  in moderate respiratory distress     ENT:   Airway patent,   Mouth with normal mucosa.   No thrush    EYES:   Pupils equal,   Round and reactive to light.    CARDIAC:   Normal rate,   Regular rhythm.    No edema      Vascular:  Normal systolic impulse  No Carotid bruits    RESPIRATORY:   No wheezing  Bilateral BS.  Bilateral crackles   Normal chest expansion  Not tachypneic,  No use of accessory muscles    GASTROINTESTINAL:  Abdomen soft,   Non-tender,   No guarding,   + BS    GENITOURINARY  normal genitalia for sex  no edema    MUSCULOSKELETAL:   Range of motion is not limited,  No muscle or joint tenderness  No clubbing, cyanosis    NEUROLOGICAL:   Sedated    SKIN:   Skin normal color for race,   Warm and dry and intact.   No evidence of rash.    PSYCHIATRIC:   Normal mood and affect.   No apparent risk to self or others.    HEME LYMPH:   No cervical  lymphadenopathy.  no inguinal lymphadenopathy      03-04-20 @ 07:01  -  03-05-20 @ 07:00  --------------------------------------------------------  IN:    dexmedetomidine Infusion: 272.6 mL    dexmedetomidine Infusion: 21 mL    fentaNYL Infusion.: 21 mL    fentaNYL Infusion.: 365.4 mL    Free Water: 750 mL    insulin regular Infusion: 118 mL    midazolam Infusion: 7 mL    norepinephrine Infusion: 96.8 mL    Osmolite: 720 mL  Total IN: 2371.8 mL    OUT:    Indwelling Catheter - Urethral: 2695 mL  Total OUT: 2695 mL    Total NET: -323.2 mL          LABS:                            9.5    9.81  )-----------( 68       ( 05 Mar 2020 04:30 )             30.1                9.5    9.81  )-----------( 68       ( 03-05 @ 04:30 )             30.1                8.7    4.03  )-----------( 48       ( 03-04 @ 04:00 )             27.2                10.0   4.57  )-----------( 53       ( 03-03 @ 05:20 )             29.6                                                 03-05    141  |  100  |  39<H>  ----------------------------<  171<H>  3.4<L>   |  29  |  <0.5<L>    Ca    7.3<L>      05 Mar 2020 04:30  Mg     1.5     03-05    TPro  4.8<L>  /  Alb  1.8<L>  /  TBili  1.3<H>  /  DBili  x   /  AST  69<H>  /  ALT  82<H>  /  AlkPhos  383<H>  03-05                                                                                           LIVER FUNCTIONS - ( 05 Mar 2020 04:30 )  Alb: 1.8 g/dL / Pro: 4.8 g/dL / ALK PHOS: 383 U/L / ALT: 82 U/L / AST: 69 U/L / GGT: x                                                                                               Mode: AC/ CMV (Assist Control/ Continuous Mandatory Ventilation)  RR (machine): 16  TV (machine): 450  FiO2: 100  PEEP: 7.5  ITime: 1  MAP: 10  PIP: 27                                      ABG - ( 05 Mar 2020 04:44 )  pH, Arterial: 7.39  pH, Blood: x     /  pCO2: 51    /  pO2: 35    / HCO3: 31    / Base Excess: 5.1   /  SaO2: 67    Lac 3              MEDICATIONS  (STANDING):  ALBUTerol    90 MICROgram(s) HFA Inhaler 4 Puff(s) Inhalation every 6 hours  calcitriol  Solution 0.25 MICROGram(s) Oral daily  calcium carbonate    500 mG (Tums) Chewable 3 Tablet(s) Chew every 8 hours  chlorhexidine 0.12% Liquid 15 milliLiter(s) Oral Mucosa two times a day  chlorhexidine 4% Liquid 1 Application(s) Topical <User Schedule>  cyanocobalamin 1000 MICROGram(s) Oral daily  dexMEDEtomidine Infusion 0.5 MICROgram(s)/kG/Hr (9.025 mL/Hr) IV Continuous <Continuous>  dextrose 5%. 1000 milliLiter(s) (50 mL/Hr) IV Continuous <Continuous>  diltiazem    Tablet 60 milliGRAM(s) Oral every 8 hours  fentaNYL   Infusion. 0.5 MICROgram(s)/kG/Hr (3.61 mL/Hr) IV Continuous <Continuous>  gabapentin 300 milliGRAM(s) Oral at bedtime  influenza   Vaccine 0.5 milliLiter(s) IntraMuscular once  insulin regular Infusion 1 Unit(s)/Hr (1 mL/Hr) IV Continuous <Continuous>  ipratropium 17 MICROgram(s) HFA Inhaler 4 Puff(s) Inhalation every 6 hours  meropenem  IVPB      meropenem  IVPB 1000 milliGRAM(s) IV Intermittent every 8 hours  methylPREDNISolone sodium succinate Injectable 60 milliGRAM(s) IV Push daily  midazolam Infusion 0.02 mG/kG/Hr (1.444 mL/Hr) IV Continuous <Continuous>  midazolam Infusion 0.02 mG/kG/Hr (1.444 mL/Hr) IV Continuous <Continuous>  multivitamin/minerals 1 Tablet(s) Oral daily  mupirocin 2% Ointment 1 Application(s) Topical two times a day  norepinephrine Infusion 0.05 MICROgram(s)/kG/Min (3.384 mL/Hr) IV Continuous <Continuous>  pantoprazole   Suspension 40 milliGRAM(s) Oral before breakfast    MEDICATIONS  (PRN):  acetaminophen   Tablet .. 650 milliGRAM(s) Oral every 6 hours PRN Temp greater or equal to 38C (100.4F)  glucagon  Injectable 1 milliGRAM(s) IntraMuscular once PRN Glucose LESS THAN 70 milligrams/deciliter      New X-rays reviewed:                                                                                  ECHO    CXR interpreted by me:  ET OG OK>  Possible worsening infiltrate ARLENE

## 2020-03-05 NOTE — PHARMACOTHERAPY INTERVENTION NOTE - COMMENTS
DDAVP 0.2mg po q8h x3 doses-d/w medical team, recommended changing to 24mcg IV q8h over 20min x3 doses
Humalog ac ss, feeds q6h, d/w medical team, recommended changing Humalog administration to q6h to coincide w/ feeds
I spoke to md, the patient is immune compromised, md prolonged antibiotic course
Prescriber was contacted to clarify time of administration.
d/w medical team to evaluate Lantus 50 units qam & on insulin drip per protocol @ 4 units/hr  -d/c Lantus
d/w medical team to evaluate:  -diltiazem 60mg q8h on levophed drip-d/c diltiazem  -recommended vancomycin load 1.5g IVx1 then 750mg IV q12h  -calculated load Nimbex 10mg IV x1
d/w medical team, new order for labetalol 100mg q12h via gt, to please d/c levophed from med profile
d/w medical team, recommended DDAVP 24 mcg IVPB q12h x3 doses
d/w medical team, recommended changing albuterol & atrovent nebulizer to HFA while intubated
d/w medical team, recommended:  -d/c propofol & Precedex, pt sedated with midazolam & fentanyl drip  -change pantoprazole to suspension 40mg via tube q24h  -d/c Spiriva, on Atrovent inhaler while intubated  -change lidocaine jelly to viscous for bronch
decrease solu-medrol 60mg IV q24h, pt received dose 6am, d/w medical team, will start 2/13/20
plan per rounds, decrease solumedrol 80mg IV q6h, f/u w/ team to adjust dose  -changed to IVPB, notified to change to IVP
spoke with MD, recommendation to change levaquin 750mg IVPB q 24 to q 48 based on gfr 24.
GFR 19-d/w medical team, recommended adjusting:  -vancomycin 750mg IV q12h, recommended 1250mg IV [id number] to 750mg IV q24h, start 2/4  -levofloxacin 750mg IV q48, recommended 500mg IV q48h, start 2/4  -oseltamivir 75mg q12h, recommended 30mg q24h x2 more days, start 2/4  -cefepime 2g IV q12h, recommended 2g IV q24h, start 2/4

## 2020-03-05 NOTE — PROGRESS NOTE ADULT - SUBJECTIVE AND OBJECTIVE BOX
DARNELL, DONI  75y, Female  Allergy: No Known Allergies      LOS  34d    CHIEF COMPLAINT: SOB and desaturation (05 Mar 2020 06:55)      INTERVAL EVENTS/HPI  - worsening CXR, pressors  - T(F): , Max: 98.6 (03-04-20 @ 09:00)  - WBC Count: 9.81 (03-05-20 @ 04:30)  WBC Count: 4.03 (03-04-20 @ 04:00)  - Creatinine, Serum: <0.5 (03-05-20 @ 04:30)  Creatinine, Serum: 0.5 (03-04-20 @ 04:00)       ROS  unable to obtain history secondary to patient's mental status and/or sedation     VITALS:  T(F): 97.6, Max: 98.6 (03-04-20 @ 09:00)  HR: 100  BP: 105/63  RR: 20Vital Signs Last 24 Hrs  T(C): 36.4 (04 Mar 2020 20:00), Max: 37 (04 Mar 2020 09:00)  T(F): 97.6 (04 Mar 2020 20:00), Max: 98.6 (04 Mar 2020 09:00)  HR: 100 (05 Mar 2020 07:00) (96 - 136)  BP: 105/63 (05 Mar 2020 07:00) (67/53 - 200/99)  BP(mean): 74 (05 Mar 2020 07:00) (52 - 122)  RR: 20 (05 Mar 2020 07:00) (18 - 64)  SpO2: 94% (05 Mar 2020 07:00) (86% - 99%)    PHYSICAL EXAM:  Gen: chronically ill appearing intubated  HEENT: Normocephalic, atraumatic  Neck: supple, no lymphadenopathy  CV: Regular rate & regular rhythm  Lungs: decreased BS at bases, no fremitus  Abdomen: Soft, BS present  Ext: Warm, well perfused, anasarca  Neuro: sedated  Skin: no rash, no erythema, multiple ecchymoses  Lines: no phlebitis, IJ, midline (bloody dressing)        FH: Non-contributory  Social Hx: Non-contributory    TESTS & MEASUREMENTS:                        9.5    9.81  )-----------( 68       ( 05 Mar 2020 04:30 )             30.1     03-05    141  |  100  |  39<H>  ----------------------------<  171<H>  3.4<L>   |  29  |  <0.5<L>    Ca    7.3<L>      05 Mar 2020 04:30  Mg     1.5     03-05    TPro  4.8<L>  /  Alb  1.8<L>  /  TBili  1.3<H>  /  DBili  x   /  AST  69<H>  /  ALT  82<H>  /  AlkPhos  383<H>  03-05    eGFR if Non African American: 102 mL/min/1.73M2 (03-05-20 @ 04:30)  eGFR if : 118 mL/min/1.73M2 (03-05-20 @ 04:30)    LIVER FUNCTIONS - ( 05 Mar 2020 04:30 )  Alb: 1.8 g/dL / Pro: 4.8 g/dL / ALK PHOS: 383 U/L / ALT: 82 U/L / AST: 69 U/L / GGT: x               Culture - Acid Fast - Bronchial w/Smear (collected 02-26-20 @ 14:45)  Source: .Bronchial None  Preliminary Report (02-29-20 @ 15:04):    Culture is being performed.    Culture - Fungal, Bronchial (collected 02-26-20 @ 14:45)  Source: .Bronchial None  Preliminary Report (03-02-20 @ 08:54):    Rare Yeast    Culture - Bronchial (collected 02-26-20 @ 14:45)  Source: .Bronchial None  Gram Stain (02-27-20 @ 07:36):    Moderate polymorphonuclear leukocytes seen per low power field    Rare Squamous epithelial cells seen per low power field    Numerous Gram Negative Rods seen per oil power field  Final Report (02-28-20 @ 21:13):    Numerous Klebsiella pneumoniae    Normal Respiratory Nikki present  Organism: Klebsiella pneumoniae (02-28-20 @ 21:13)  Organism: Klebsiella pneumoniae (02-28-20 @ 21:13)      -  Amikacin: S <=16      -  Amoxicillin/Clavulanic Acid: S <=8/4      -  Ampicillin: R >16 These ampicillin results predict results for amoxicillin      -  Ampicillin/Sulbactam: S 8/4 Enterobacter, Citrobacter, and Serratia may develop resistance during prolonged therapy (3-4 days)      -  Aztreonam: S <=4      -  Cefazolin: S <=2 Enterobacter, Citrobacter, and Serratia may develop resistance during prolonged therapy (3-4 days)      -  Cefepime: S <=2      -  Cefoxitin: S <=8      -  Ceftriaxone: S <=1 Enterobacter, Citrobacter, and Serratia may develop resistance during prolonged therapy      -  Ciprofloxacin: S <=1      -  Ertapenem: S <=0.5      -  Gentamicin: S <=2      -  Imipenem: S <=1      -  Levofloxacin: S <=2      -  Meropenem: S <=1      -  Piperacillin/Tazobactam: S <=8      -  Tobramycin: S <=2      -  Trimethoprim/Sulfamethoxazole: S <=2/38      Method Type: EROS    Culture - Blood (collected 02-17-20 @ 05:17)  Source: .Blood None  Final Report (02-22-20 @ 14:00):    No growth at 5 days.    Culture - Blood (collected 02-15-20 @ 04:57)  Source: .Blood None  Final Report (02-20-20 @ 18:01):    No growth at 5 days.    Culture - Sputum (collected 02-14-20 @ 17:00)  Source: .Sputum Sputum  Gram Stain (02-15-20 @ 04:56):    Few Squamous epithelial cells per low power field    Few polymorphonuclear leukocytes per low power field    Few Yeast like cells per oil power field    Few Gram variable coccobacilli per oil power field  Final Report (02-16-20 @ 17:44):    Moderate Methicillin resistant Staphylococcus aureus    Normal Respiratory Nikki present  Organism: Methicillin resistant Staphylococcus aureus (02-16-20 @ 17:44)  Organism: Methicillin resistant Staphylococcus aureus (02-16-20 @ 17:44)      -  Ampicillin/Sulbactam: R <=8/4      -  Cefazolin: R <=4      -  Clindamycin: S <=0.25      -  Erythromycin: R >4      -  Gentamicin: S <=1 Should not be used as monotherapy      -  Linezolid: S 2      -  Oxacillin: R >2      -  Penicillin: R >8      -  RIF- Rifampin: S <=1 Should not be used as monotherapy      -  Tetra/Doxy: S <=1      -  Trimethoprim/Sulfamethoxazole: S <=0.5/9.5      -  Vancomycin: S 2      Method Type: EROS    Culture - Blood (collected 02-14-20 @ 04:30)  Source: .Blood Blood-Peripheral  Final Report (02-19-20 @ 14:00):    No growth at 5 days.    Culture - Other (collected 02-13-20 @ 18:55)  Source: .Other wound  Final Report (02-15-20 @ 19:22):    No growth    Culture - Blood (collected 02-12-20 @ 04:50)  Source: .Blood None  Gram Stain (02-13-20 @ 14:24):    Growth in aerobic bottle: Yeast like cells  Final Report (02-18-20 @ 15:03):    Growth in aerobic bottle: Candida albicans    See previous culture [id number]    Culture - Blood (collected 02-11-20 @ 11:21)  Source: .Blood None  Gram Stain (02-12-20 @ 16:48):    Growth in aerobic bottle: Yeast like cells  Final Report (02-15-20 @ 14:05):    Growth in aerobic bottle: Candida albicans    ***Blood Panel PCR results on this specimen are available    approximately 3 hours after the Gram stain result.***    Gram stain, PCR, and/or culture results may not always    correspond due to difference in methodologies.    ************************************************************    This PCR assay was performed using [email or web address].    The following targets are tested for: Enterococcus,    vancomycin resistant enterococci, Listeria monocytogenes,    coagulase negative staphylococci, S. aureus,    methicillin resistant S. aureus, Streptococcus agalactiae    (Group B), S. pneumoniae, S. pyogenes (Group A),    Acinetobacter baumannii, Enterobacter cloacae, E. coli,    Klebsiella oxytoca, K. pneumoniae, Proteus sp.,    Serratia marcescens, Haemophilus influenzae,    Neisseria meningitidis, Pseudomonas aeruginosa, Candida    albicans, C. glabrata, C krusei, C parapsilosis,    C. tropicalis and the KPC resistance gene.  Organism: Blood Culture PCR  Candida albicans (02-15-20 @ 14:05)  Organism: Candida albicans (02-15-20 @ 14:05)      -  Fluconazole: S 0.25 Fluconazole = Data established for mucosal disease, and is generally applicable for invasive disease.  Susceptibility is dependent on achieving the maximal possible blood level.  Doses of 400 MG/day or more may be required in adults with normal renalfunction and body habitus.      -  Interpretation: See note This test method is not approved by the FDA and is for research use only.  The performance characteristics of this assay may have been determined by [email or web address] and [facility], [facility].                            N/I - No interpretation available                                                         SDD – Sensitive Dose Dependent      Method Type: YSTMIC  Organism: Blood Culture PCR (02-15-20 @ 14:05)      -  Candida albicans: Detec      Method Type: PCR            INFECTIOUS DISEASES TESTING  Fungitell: >500 (02-11-20 @ 11:21)  Fungitell: <31 (02-04-20 @ 04:40)  Streptococcus Pneumoniae Ag Urine: Negative (02-02-20 @ 11:00)  MRSA PCR Result.: Positive (02-01-20 @ 20:40)  Procalcitonin, Serum: 1.86 (02-01-20 @ 20:30)  Procalcitonin, Serum: 1.35 (02-01-20 @ 11:17)  Fungitell: 49 (02-01-20 @ 11:17)  Procalcitonin, Serum: 0.77 (02-01-20 @ 04:46)  Rapid RVP Result: Detected (01-31-20 @ 16:24)  Legionella Antigen, Urine: Negative (01-31-20 @ 15:36)  Streptococcus Pneumoniae Ag Urine: Negative (01-31-20 @ 15:36)  Legionella Antigen, Urine: Negative (01-20-20 @ 02:50)  Procalcitonin, Serum: 0.05 (01-16-20 @ 11:30)  MRSA PCR Result.: Positive (01-14-20 @ 13:59)  Flu A Result: Negative (01-13-20 @ 13:10)  Flu B Result: Negative (01-13-20 @ 13:10)  RSV Result: Negative (01-13-20 @ 13:10)  MRSA PCR Result.: Negative (08-13-19 @ 08:46)      RADIOLOGY & ADDITIONAL TESTS:  I have personally reviewed the last available Chest xray  CXR      CT      CARDIOLOGY TESTING  Transthoracic Echocardiogram:    EXAM:  2-D ECHO (TTE) COMPLETE        PROCEDURE DATE:  02/26/2020      INTERPRETATION:  REPORT:    TRANSTHORACIC ECHOCARDIOGRAM REPORT         Patient Name:   DONI PATRICK Accession #: [id number]  Medical Rec #:  [id number]     Height:      64.0 in 162.6 cm  YOB: 1945    Weight:      181.0 lb 82.10 kg  Patient Age:    75 years     BSA:         1.87 m²  Patient Gender: F            BP:          106/63 mmHg       Date of Exam:        2/26/2020 4:09:11 PM  Referring Physician: [id number] [facility]  Sonographer:         Peter Peterson  Reading Physician:   Alexander Alex M.D.    Procedure:   2D Echo/Doppler/Color Doppler Complete.  Indications: I26.99 - Other Pulmonary Embolism without Acute Cor Pulmonale  Diagnosis:   Other pulmonary embolism without acute cor pulmonale - I26.99         Summary:   1. LV Ejection Fraction by Caballero's Method with a biplane EF of 70 %.   2. Moderate concentric left ventricular hypertrophy.   3. Spectral Doppler shows impaired relaxation pattern of left ventricular myocardial filling (Grade I diastolic dysfunction).   4. Mild mitral valve regurgitation.   5. Sclerotic aortic valve with normal opening.   6. Estimated pulmonary artery systolic pressure is 36.2 mmHg assuming a right atrial pressure of 5 mmHg, which is consistent with borderline pulmonary hypertension.    PHYSICIAN INTERPRETATION:  Left Ventricle: The left ventricular internal cavity size is normal. There is moderate concentric left ventricular hypertrophy. Spectral Doppler shows impaired relaxation pattern of left ventricular myocardial filling (Grade I diastolic dysfunction).  Right Ventricle: Normal right ventricular size and function.  Left Atrium: Normal left atrial size.  Right Atrium: Normal right atrial size.  Pericardium: There is no evidence of pericardial effusion.  Mitral Valve: Structurally normal mitral valve, with normal leaflet excursion. The mitral valve is normal in structure. Mild mitral valve regurgitation is seen.  Tricuspid Valve: Structurally normal tricuspid valve, with normal leaflet excursion. The tricuspid valve is normal in structure. Mild tricuspid regurgitation is visualized. Estimated pulmonary artery systolic pressure is 36.2 mmHg assuming a right atrial pressure of 5 mmHg, which is consistent with borderline pulmonary hypertension.  Aortic Valve: The aortic valve is trileaflet. No evidence of aortic stenosis. Sclerotic aortic valve with normal opening. Trivial aortic valve regurgitation is seen.  Pulmonic Valve: Structurally normal pulmonic valve, with normal leaflet excursion. The pulmonic valve is normal. Trace pulmonic valve regurgitation.  Aorta: The aortic root and ascending aorta are structurally normal, with no evidence of dilitation.  Pulmonary Artery: The main pulmonary artery is normal in size.       2D AND M-MODE MEASUREMENTS (normal ranges within parentheses):  Left Ventricle:                  Normal   Aorta/Left Atrium:             Normal  IVSd (2D):              1.51 cm (0.7-1.1) AoV Cusp Separation: 1.74 cm (1.5-2.6)  LVPWd (2D):             1.51 cm (0.7-1.1) Left Atrium (Mmode): 2.63 cm (1.9-4.0)  LVIDd (2D):             3.22 cm (3.4-5.7) Right Ventricle:  LVIDs (2D):             1.90 cm           RVd (2D):        2.29 cm  LV FS (2D):             41.1%   (>25%)  Relative Wall Thickness  0.94    (<0.42)    SPECTRAL DOPPLER ANALYSIS:  LV DIASTOLIC FUNCTION:  MV Peak E: 0.60 m/s Decel Time: 229 msec  MV Peak A: 0.88 m/s  E/A Ratio: 0.68    Aortic Valve:  AoV VMax:    1.32 m/s AoV Area, Vmax: 2.41 cm² Vmax Indx: 1.29 cm²/m²  AoV Pk Grad: 7.0 mmHg    LVOT Vmax: 1.02 m/s  LVOT VTI:  0.20 m  LVOT Diam: 2.00 cm    Mitral Valve:  MV P1/2 Time: 66.49 msec  MV Area, PHT: 3.31 cm²    Tricuspid Valve and PA/RV Systolic Pressure: TR Max Velocity: 2.79 m/s RA Pressure: 5 mmHg RVSP/PASP: 36.2 mmHg       [id number] Alexander Alex M.D., Electronically signed on 2/26/2020 at 4:20:25 PM              *** Final ***                    ALEXANDER ALEX MD  This document has been electronically signed. Feb 26 2020  4:09PM             (02-26-20 @ 16:09)  12 Lead ECG:   Ventricular Rate 127 BPM    Atrial Rate 127 BPM    P-R Interval 128 ms    QRS Duration 80 ms    Q-T Interval 300 ms    QTC Calculation(Bezet) 436 ms    P Axis 41 degrees    R Axis 1 degrees    T Axis 32 degrees    Diagnosis Line Sinus tachycardia with Premature atrial complexes  Possible Left atrial enlargement  Nonspecific ST abnormality  Abnormal ECG    Confirmed by Mary Cole MD ([id number]) on 2/24/2020 8:03:09 AM (02-21-20 @ 18:39)      MEDICATIONS  ALBUTerol    90 MICROgram(s) HFA Inhaler 4  calcitriol  Solution 0.25  calcium carbonate    500 mG (Tums) Chewable 3  chlorhexidine 0.12% Liquid 15  chlorhexidine 4% Liquid 1  cisatracurium Injectable 10  cyanocobalamin 1000  dexMEDEtomidine Infusion 0.5  dextrose 5%. 1000  diltiazem    Tablet 60  fentaNYL   Infusion. 0.5  gabapentin 300  influenza   Vaccine 0.5  insulin regular Infusion 1  ipratropium 17 MICROgram(s) HFA Inhaler 4  magnesium sulfate  IVPB 2  meropenem  IVPB   meropenem  IVPB 1000  methylPREDNISolone sodium succinate Injectable 60  midazolam Infusion 0.02  midazolam Infusion 0.02  multivitamin/minerals 1  mupirocin 2% Ointment 1  norepinephrine Infusion 0.05  pantoprazole   Suspension 40  potassium chloride  20 mEq/100 mL IVPB 20      WEIGHT  Weight (kg): 72.2 (02-26-20 @ 11:30)  Creatinine, Serum: <0.5 mg/dL (03-05-20 @ 04:30)      ANTIBIOTICS:  meropenem  IVPB      meropenem  IVPB 1000 milliGRAM(s) IV Intermittent every 8 hours      All available historical records have been reviewed

## 2020-03-05 NOTE — PROCEDURAL SAFETY CHECKLIST WITH OR WITHOUT SEDATION - [id number]_GEN_ALL_CORE
04-Feb-2020 11:30
27-Feb-2020 14:00
05-Mar-2020 11:00
26-Feb-2020 13:15
05-Mar-2020 16:00
16-Feb-2020 08:45
26-Feb-2020 14:30
05-Feb-2020 20:00
11-Feb-2020 11:45
01-Feb-2020 13:30

## 2020-03-06 NOTE — PROGRESS NOTE ADULT - SUBJECTIVE AND OBJECTIVE BOX
ELDER, DONI  75y  Female  HPI:  73y/o F w/ hx of asthma, COPD not on home O2, autoimmune hepatitis on prednisone and tacrolimus, heterozygous prothrombin gene mutation with hx of PE and DVT on coumadin with recent hospitalization in January 2020 with a diagnosis of pneumonia presents for worsening SOB, hemoptysis and cough. Everything started yesterday night when patient was in bed, started to feel shortness of breath and had many episodes of hemoptysis with clots, she also had substernal chest pain non radiating, moderate in intensity that worsens on coughing. She denies fever but endorses chills. She also admits for lightheadedness that occurred yesterday, no HA, abd pain, dysuria or paresthesias. She had 2 loose bowel movements since yesterday with some blood in the stools that she attributes to her hemorrhoids. She stopped Coumadin couple of days ago since her INR was supratherapeutic. She is from [facility] short term and also mentions that her  and another family member have the flu and she was exposed to them. She did not have the flu shot this season.  In ED, temp was 100.1 rectally, tachycardic ( sinus) , she was saturating to 70s on non rebreather and her SaO2 went up to 95%. ABG showing respiratory alkalosis initially and then corrected after BIPAP placement and correction of RR.  CTA chest showing no PE but showing interval development of multifocal bilateral groundglass opacities as well as new moderate to severe bronchiectasis in the right lung. Findings are concerning for an acute infectious/inflammatory process. (31 Jan 2020 14:36)    MEDICATIONS  (STANDING):  ALBUTerol    90 MICROgram(s) HFA Inhaler 4 Puff(s) Inhalation every 6 hours  calcitriol  Solution 0.25 MICROGram(s) Oral daily  calcium carbonate    500 mG (Tums) Chewable 3 Tablet(s) Chew every 8 hours  caspofungin IVPB      caspofungin IVPB 50 milliGRAM(s) IV Intermittent every 24 hours  chlorhexidine 0.12% Liquid 15 milliLiter(s) Oral Mucosa two times a day  chlorhexidine 4% Liquid 1 Application(s) Topical <User Schedule>  cisatracurium Infusion 3 MICROgram(s)/kG/Min (12.996 mL/Hr) IV Continuous <Continuous>  cisatracurium Injectable 10 milliGRAM(s) IV Push once  cyanocobalamin 1000 MICROGram(s) Oral daily  dexMEDEtomidine Infusion 0.5 MICROgram(s)/kG/Hr (9.025 mL/Hr) IV Continuous <Continuous>  dextrose 5%. 1000 milliLiter(s) (50 mL/Hr) IV Continuous <Continuous>  fentaNYL   Infusion. 0.5 MICROgram(s)/kG/Hr (3.61 mL/Hr) IV Continuous <Continuous>  gabapentin 300 milliGRAM(s) Oral at bedtime  influenza   Vaccine 0.5 milliLiter(s) IntraMuscular once  insulin regular Infusion 1 Unit(s)/Hr (1 mL/Hr) IV Continuous <Continuous>  ipratropium 17 MICROgram(s) HFA Inhaler 4 Puff(s) Inhalation every 6 hours  lactated ringers Bolus 500 milliLiter(s) IV Bolus once  meropenem  IVPB      meropenem  IVPB 1000 milliGRAM(s) IV Intermittent every 8 hours  methylPREDNISolone sodium succinate Injectable 40 milliGRAM(s) IV Push daily  multivitamin/minerals 1 Tablet(s) Oral daily  mupirocin 2% Ointment 1 Application(s) Topical two times a day  norepinephrine Infusion 0.05 MICROgram(s)/kG/Min (3.384 mL/Hr) IV Continuous <Continuous>  pantoprazole   Suspension 40 milliGRAM(s) Oral before breakfast  vancomycin  IVPB 750 milliGRAM(s) IV Intermittent every 12 hours    MEDICATIONS  (PRN):  acetaminophen   Tablet .. 650 milliGRAM(s) Oral every 6 hours PRN Temp greater or equal to 38C (100.4F)  glucagon  Injectable 1 milliGRAM(s) IntraMuscular once PRN Glucose LESS THAN 70 milligrams/deciliter    INTERVAL EVENTS: Patient seen today remains vented, sedated and remains on pressors. Patient's FIO2 lowered to 80% but remains on a PEEP of 10. Patient's  and son at bedside.  beginning to understand the gravity of patient 's condition    T(C): 36.7 (03-06-20 @ 08:00), Max: 37.7 (03-06-20 @ 04:00)  HR: 90 (03-06-20 @ 09:30) (90 - 108)  BP: 99/60 (03-05-20 @ 16:00) (82/55 - 99/60)  RR: 25 (03-06-20 @ 09:30) (18 - 38)  SpO2: 99% (03-06-20 @ 09:30) (96% - 100%)  Wt(kg): --Vital Signs Last 24 Hrs  T(C): 36.7 (06 Mar 2020 08:00), Max: 37.7 (06 Mar 2020 04:00)  T(F): 98 (06 Mar 2020 08:00), Max: 99.9 (06 Mar 2020 04:00)  HR: 90 (06 Mar 2020 09:30) (90 - 108)  BP: 99/60 (05 Mar 2020 16:00) (82/55 - 99/60)  BP(mean): 68 (05 Mar 2020 16:00) (57 - 81)  RR: 25 (06 Mar 2020 09:30) (18 - 38)  SpO2: 99% (06 Mar 2020 09:30) (96% - 100%)    PHYSICAL EXAM:  GENERAL: Intubated, feeding tube in place  CHEST/LUNG: Coarse BS; No wheezing  HEART: S1, S2, Regular rate and rhythm  ABDOMEN: Soft, Nondistended; Bowel sounds present  EXTREMITIES: ++ edema  SKIN: Arms and legs wrapped in ace bandages    LABS:  Labs:                        8.6    6.56  )-----------( 69       ( 06 Mar 2020 04:30 )             27.6             03-06    137  |  102  |  37<H>  ----------------------------<  135<H>  4.3   |  28  |  0.5<L>    Ca    7.2<L>      06 Mar 2020 04:30  Mg     1.7     03-06    TPro  4.5<L>  /  Alb  1.4<L>  /  TBili  0.8  /  DBili  x   /  AST  33  /  ALT  55<H>  /  AlkPhos  287<H>  03-06    LIVER FUNCTIONS - ( 06 Mar 2020 04:30 )  Alb: 1.4 g/dL / Pro: 4.5 g/dL / ALK PHOS: 287 U/L / ALT: 55 U/L / AST: 33 U/L / GGT: x                 PT/INR - ( 06 Mar 2020 04:30 )   PT: 14.60 sec;   INR: 1.27 ratio         PTT - ( 06 Mar 2020 04:30 )  PTT:29.2 sec      ABG - ( 06 Mar 2020 03:33 )  pH, Arterial: 7.38  pH, Blood: x     /  pCO2: 50    /  pO2: 68    / HCO3: 29    / Base Excess: 3.4   /  SaO2: 94            Culture - Fungal, Bronchial (collected 05 Mar 2020 10:34)  Source: .Bronchial None  Preliminary Report (06 Mar 2020 06:42):    Testing in progress    Culture - Bronchial (collected 05 Mar 2020 10:34)  Source: .Bronchial None  Gram Stain (06 Mar 2020 06:31):    Few polymorphonuclear leukocytes seen per low power field    Rare Squamous epithelial cells seen per low power field    Numerous Gram Variable Rods seen per oil power field    Few Gram positive cocci in pairs seen per oil power field      RADIOLOGY & ADDITIONAL TESTS:  < from: Xray Chest 1 View- PORTABLE-Routine (03.05.20 @ 05:15) >    Impression:      Cardiomegaly. Calcified fibrothorax with superimposed opacities.    Worsening.    < end of copied text >

## 2020-03-06 NOTE — PROGRESS NOTE ADULT - SUBJECTIVE AND OBJECTIVE BOX
DONI PATRICK  75y, Female  Allergy: No Known Allergies      LOS  35d    CHIEF COMPLAINT: SOB and desaturation (06 Mar 2020 07:59)      INTERVAL EVENTS/HPI  - s/p bronch with purulent secretions  - liquid stool   - T(F): , Max: 99.9 (03-06-20 @ 04:00)  - WBC Count: 6.56 (03-06-20 @ 04:30)  WBC Count: 9.81 (03-05-20 @ 04:30)  - Creatinine, Serum: 0.5 (03-06-20 @ 04:30)  Creatinine, Serum: <0.5 (03-05-20 @ 04:30)       ROS  unable to obtain history secondary to patient's mental status and/or sedation     VITALS:  T(F): 99.9, Max: 99.9 (03-06-20 @ 04:00)  HR: 94  BP: 99/60  RR: 28Vital Signs Last 24 Hrs  T(C): 37.7 (06 Mar 2020 04:00), Max: 37.7 (06 Mar 2020 04:00)  T(F): 99.9 (06 Mar 2020 04:00), Max: 99.9 (06 Mar 2020 04:00)  HR: 94 (06 Mar 2020 07:00) (90 - 132)  BP: 99/60 (05 Mar 2020 16:00) (44/32 - 183/94)  BP(mean): 68 (05 Mar 2020 16:00) (35 - 136)  RR: 28 (06 Mar 2020 07:00) (18 - 38)  SpO2: 99% (06 Mar 2020 07:00) (92% - 100%)    PHYSICAL EXAM:  Gen: chronically ill appearing intubated  HEENT: Normocephalic, atraumatic  Neck: supple, no lymphadenopathy  CV: Regular rate & regular rhythm  Lungs: decreased BS at bases, no fremitus  Abdomen: Soft, BS present  Ext: Warm, well perfused, anasarca  Neuro: sedated  Skin: no rash, no erythema, multiple ecchymoses  Lines: no phlebitis, IJ    FH: Non-contributory  Social Hx: Non-contributory    TESTS & MEASUREMENTS:                        8.6    6.56  )-----------( 69       ( 06 Mar 2020 04:30 )             27.6     03-06    137  |  102  |  37<H>  ----------------------------<  135<H>  4.3   |  28  |  0.5<L>    Ca    7.2<L>      06 Mar 2020 04:30  Mg     1.7     03-06    TPro  4.5<L>  /  Alb  1.4<L>  /  TBili  0.8  /  DBili  x   /  AST  33  /  ALT  55<H>  /  AlkPhos  287<H>  03-06    eGFR if Non African American: 95 mL/min/1.73M2 (03-06-20 @ 04:30)  eGFR if African American: 110 mL/min/1.73M2 (03-06-20 @ 04:30)    LIVER FUNCTIONS - ( 06 Mar 2020 04:30 )  Alb: 1.4 g/dL / Pro: 4.5 g/dL / ALK PHOS: 287 U/L / ALT: 55 U/L / AST: 33 U/L / GGT: x               Culture - Fungal, Bronchial (collected 03-05-20 @ 10:34)  Source: .Bronchial None  Preliminary Report (03-06-20 @ 06:42):    Testing in progress    Culture - Bronchial (collected 03-05-20 @ 10:34)  Source: .Bronchial None  Gram Stain (03-06-20 @ 06:31):    Few polymorphonuclear leukocytes seen per low power field    Rare Squamous epithelial cells seen per low power field    Numerous Gram Variable Rods seen per oil power field    Few Gram positive cocci in pairs seen per oil power field    Culture - Acid Fast - Bronchial w/Smear (collected 02-26-20 @ 14:45)  Source: .Bronchial None  Preliminary Report (02-29-20 @ 15:04):    Culture is being performed.    Culture - Fungal, Bronchial (collected 02-26-20 @ 14:45)  Source: .Bronchial None  Preliminary Report (03-02-20 @ 08:54):    Rare Yeast    Culture - Bronchial (collected 02-26-20 @ 14:45)  Source: .Bronchial None  Gram Stain (02-27-20 @ 07:36):    Moderate polymorphonuclear leukocytes seen per low power field    Rare Squamous epithelial cells seen per low power field    Numerous Gram Negative Rods seen per oil power field  Final Report (02-28-20 @ 21:13):    Numerous Klebsiella pneumoniae    Normal Respiratory Nikki present  Organism: Klebsiella pneumoniae (02-28-20 @ 21:13)  Organism: Klebsiella pneumoniae (02-28-20 @ 21:13)      -  Amikacin: S <=16      -  Amoxicillin/Clavulanic Acid: S <=8/4      -  Ampicillin: R >16 These ampicillin results predict results for amoxicillin      -  Ampicillin/Sulbactam: S 8/4 Enterobacter, Citrobacter, and Serratia may develop resistance during prolonged therapy (3-4 days)      -  Aztreonam: S <=4      -  Cefazolin: S <=2 Enterobacter, Citrobacter, and Serratia may develop resistance during prolonged therapy (3-4 days)      -  Cefepime: S <=2      -  Cefoxitin: S <=8      -  Ceftriaxone: S <=1 Enterobacter, Citrobacter, and Serratia may develop resistance during prolonged therapy      -  Ciprofloxacin: S <=1      -  Ertapenem: S <=0.5      -  Gentamicin: S <=2      -  Imipenem: S <=1      -  Levofloxacin: S <=2      -  Meropenem: S <=1      -  Piperacillin/Tazobactam: S <=8      -  Tobramycin: S <=2      -  Trimethoprim/Sulfamethoxazole: S <=2/38      Method Type: EROS    Culture - Blood (collected 02-17-20 @ 05:17)  Source: .Blood None  Final Report (02-22-20 @ 14:00):    No growth at 5 days.    Culture - Blood (collected 02-15-20 @ 04:57)  Source: .Blood None  Final Report (02-20-20 @ 18:01):    No growth at 5 days.    Culture - Sputum (collected 02-14-20 @ 17:00)  Source: .Sputum Sputum  Gram Stain (02-15-20 @ 04:56):    Few Squamous epithelial cells per low power field    Few polymorphonuclear leukocytes per low power field    Few Yeast like cells per oil power field    Few Gram variable coccobacilli per oil power field  Final Report (02-16-20 @ 17:44):    Moderate Methicillin resistant Staphylococcus aureus    Normal Respiratory Nikki present  Organism: Methicillin resistant Staphylococcus aureus (02-16-20 @ 17:44)  Organism: Methicillin resistant Staphylococcus aureus (02-16-20 @ 17:44)      -  Ampicillin/Sulbactam: R <=8/4      -  Cefazolin: R <=4      -  Clindamycin: S <=0.25      -  Erythromycin: R >4      -  Gentamicin: S <=1 Should not be used as monotherapy      -  Linezolid: S 2      -  Oxacillin: R >2      -  Penicillin: R >8      -  RIF- Rifampin: S <=1 Should not be used as monotherapy      -  Tetra/Doxy: S <=1      -  Trimethoprim/Sulfamethoxazole: S <=0.5/9.5      -  Vancomycin: S 2      Method Type: EROS    Culture - Blood (collected 02-14-20 @ 04:30)  Source: .Blood Blood-Peripheral  Final Report (02-19-20 @ 14:00):    No growth at 5 days.    Culture - Other (collected 02-13-20 @ 18:55)  Source: .Other wound  Final Report (02-15-20 @ 19:22):    No growth    Culture - Blood (collected 02-12-20 @ 04:50)  Source: .Blood None  Gram Stain (02-13-20 @ 14:24):    Growth in aerobic bottle: Yeast like cells  Final Report (02-18-20 @ 15:03):    Growth in aerobic bottle: Candida albicans    See previous culture [id number]    Culture - Blood (collected 02-11-20 @ 11:21)  Source: .Blood None  Gram Stain (02-12-20 @ 16:48):    Growth in aerobic bottle: Yeast like cells  Final Report (02-15-20 @ 14:05):    Growth in aerobic bottle: Candida albicans    ***Blood Panel PCR results on this specimen are available    approximately 3 hours after the Gram stain result.***    Gram stain, PCR, and/or culture results may not always    correspond due to difference in methodologies.    ************************************************************    This PCR assay was performed using [employer].    The following targets are tested for: Enterococcus,    vancomycin resistant enterococci, Listeria monocytogenes,    coagulase negative staphylococci, S. aureus,    methicillin resistant S. aureus, Streptococcus agalactiae    (Group B), S. pneumoniae, S. pyogenes (Group A),    Acinetobacter baumannii, Enterobacter cloacae, E. coli,    Klebsiella oxytoca, K. pneumoniae, Proteus sp.,    Serratia marcescens, Haemophilus influenzae,    Neisseria meningitidis, Pseudomonas aeruginosa, Candida    albicans, C. glabrata, C krusei, C parapsilosis,    C. tropicalis and the KPC resistance gene.  Organism: Blood Culture PCR  Candida albicans (02-15-20 @ 14:05)  Organism: Candida albicans (02-15-20 @ 14:05)      -  Fluconazole: S 0.25 Fluconazole = Data established for mucosal disease, and is generally applicable for invasive disease.  Susceptibility is dependent on achieving the maximal possible blood level.  Doses of 400 MG/day or more may be required in adults with normal renalfunction and body habitus.      -  Interpretation: See note This test method is not approved by the FDA and is for research use only.  The performance characteristics of this assay may have been determined by [employer] and [facility], [place], [place]                            N/I - No interpretation available                                                         SDD – Sensitive Dose Dependent      Method Type: YSTMIC  Organism: Blood Culture PCR (02-15-20 @ 14:05)      -  Candida albicans: Detec      Method Type: PCR            INFECTIOUS DISEASES TESTING  Fungitell: >500 (02-11-20 @ 11:21)  Fungitell: <31 (02-04-20 @ 04:40)  Streptococcus Pneumoniae Ag Urine: Negative (02-02-20 @ 11:00)  MRSA PCR Result.: Positive (02-01-20 @ 20:40)  Procalcitonin, Serum: 1.86 (02-01-20 @ 20:30)  Procalcitonin, Serum: 1.35 (02-01-20 @ 11:17)  Fungitell: 49 (02-01-20 @ 11:17)  Procalcitonin, Serum: 0.77 (02-01-20 @ 04:46)  Rapid RVP Result: Detected (01-31-20 @ 16:24)  Legionella Antigen, Urine: Negative (01-31-20 @ 15:36)  Streptococcus Pneumoniae Ag Urine: Negative (01-31-20 @ 15:36)  Legionella Antigen, Urine: Negative (01-20-20 @ 02:50)  Procalcitonin, Serum: 0.05 (01-16-20 @ 11:30)  MRSA PCR Result.: Positive (01-14-20 @ 13:59)  Flu A Result: Negative (01-13-20 @ 13:10)  Flu B Result: Negative (01-13-20 @ 13:10)  RSV Result: Negative (01-13-20 @ 13:10)  MRSA PCR Result.: Negative (08-13-19 @ 08:46)      RADIOLOGY & ADDITIONAL TESTS:  I have personally reviewed the last available Chest xray  CXR      CT      CARDIOLOGY TESTING  Transthoracic Echocardiogram:    EXAM:  2-D ECHO (TTE) COMPLETE        PROCEDURE DATE:  02/26/2020      INTERPRETATION:  REPORT:    TRANSTHORACIC ECHOCARDIOGRAM REPORT         Patient Name:   DONI PATRCIK Accession #: [id number]  Medical Rec #:  [id number]     Height:      64.0 in 162.6 cm  YOB: 1945    Weight:      181.0 lb 82.10 kg  Patient Age:    75 years     BSA:         1.87 m²  Patient Gender: F            BP:          106/63 mmHg       Date of Exam:        2/26/2020 4:09:11 PM  Referring Physician: [id number] [facility]  Sonographer:         Peter Peterson  Reading Physician:   Alexander Alex M.D.    Procedure:   2D Echo/Doppler/Color Doppler Complete.  Indications: I26.99 - Other Pulmonary Embolism without Acute Cor Pulmonale  Diagnosis:   Other pulmonary embolism without acute cor pulmonale - I26.99         Summary:   1. LV Ejection Fraction by Caballero's Method with a biplane EF of 70 %.   2. Moderate concentric left ventricular hypertrophy.   3. Spectral Doppler shows impaired relaxation pattern of left ventricular myocardial filling (Grade I diastolic dysfunction).   4. Mild mitral valve regurgitation.   5. Sclerotic aortic valve with normal opening.   6. Estimated pulmonary artery systolic pressure is 36.2 mmHg assuming a right atrial pressure of 5 mmHg, which is consistent with borderline pulmonary hypertension.    PHYSICIAN INTERPRETATION:  Left Ventricle: The left ventricular internal cavity size is normal. There is moderate concentric left ventricular hypertrophy. Spectral Doppler shows impaired relaxation pattern of left ventricular myocardial filling (Grade I diastolic dysfunction).  Right Ventricle: Normal right ventricular size and function.  Left Atrium: Normal left atrial size.  Right Atrium: Normal right atrial size.  Pericardium: There is no evidence of pericardial effusion.  Mitral Valve: Structurally normal mitral valve, with normal leaflet excursion. The mitral valve is normal in structure. Mild mitral valve regurgitation is seen.  Tricuspid Valve: Structurally normal tricuspid valve, with normal leaflet excursion. The tricuspid valve is normal in structure. Mild tricuspid regurgitation is visualized. Estimated pulmonary artery systolic pressure is 36.2 mmHg assuming a right atrial pressure of 5 mmHg, which is consistent with borderline pulmonary hypertension.  Aortic Valve: The aortic valve is trileaflet. No evidence of aortic stenosis. Sclerotic aortic valve with normal opening. Trivial aortic valve regurgitation is seen.  Pulmonic Valve: Structurally normal pulmonic valve, with normal leaflet excursion. The pulmonic valve is normal. Trace pulmonic valve regurgitation.  Aorta: The aortic root and ascending aorta are structurally normal, with no evidence of dilitation.  Pulmonary Artery: The main pulmonary artery is normal in size.       2D AND M-MODE MEASUREMENTS (normal ranges within parentheses):  Left Ventricle:                  Normal   Aorta/Left Atrium:             Normal  IVSd (2D):              1.51 cm (0.7-1.1) AoV Cusp Separation: 1.74 cm (1.5-2.6)  LVPWd (2D):             1.51 cm (0.7-1.1) Left Atrium (Mmode): 2.63 cm (1.9-4.0)  LVIDd (2D):             3.22 cm (3.4-5.7) Right Ventricle:  LVIDs (2D):             1.90 cm           RVd (2D):        2.29 cm  LV FS (2D):             41.1%   (>25%)  Relative Wall Thickness  0.94    (<0.42)    SPECTRAL DOPPLER ANALYSIS:  LV DIASTOLIC FUNCTION:  MV Peak E: 0.60 m/s Decel Time: 229 msec  MV Peak A: 0.88 m/s  E/A Ratio: 0.68    Aortic Valve:  AoV VMax:    1.32 m/s AoV Area, Vmax: 2.41 cm² Vmax Indx: 1.29 cm²/m²  AoV Pk Grad: 7.0 mmHg    LVOT Vmax: 1.02 m/s  LVOT VTI:  0.20 m  LVOT Diam: 2.00 cm    Mitral Valve:  MV P1/2 Time: 66.49 msec  MV Area, PHT: 3.31 cm²    Tricuspid Valve and PA/RV Systolic Pressure: TR Max Velocity: 2.79 m/s RA Pressure: 5 mmHg RVSP/PASP: 36.2 mmHg       [id number] Alexander Alex M.D., Electronically signed on 2/26/2020 at 4:20:25 PM              *** Final ***                    ALEXANDER ALEX MD  This document has been electronically signed. Feb 26 2020  4:09PM             (02-26-20 @ 16:09)  12 Lead ECG:   Ventricular Rate 127 BPM    Atrial Rate 127 BPM    P-R Interval 128 ms    QRS Duration 80 ms    Q-T Interval 300 ms    QTC Calculation(Bezet) 436 ms    P Axis 41 degrees    R Axis 1 degrees    T Axis 32 degrees    Diagnosis Line Sinus tachycardia with Premature atrial complexes  Possible Left atrial enlargement  Nonspecific ST abnormality  Abnormal ECG    Confirmed by Mary Cole MD ([id number]) on 2/24/2020 8:03:09 AM (02-21-20 @ 18:39)      MEDICATIONS  ALBUTerol    90 MICROgram(s) HFA Inhaler 4  calcitriol  Solution 0.25  calcium carbonate    500 mG (Tums) Chewable 3  caspofungin IVPB   caspofungin IVPB 50  chlorhexidine 0.12% Liquid 15  chlorhexidine 4% Liquid 1  cisatracurium Infusion 3  cisatracurium Injectable 10  cyanocobalamin 1000  dexMEDEtomidine Infusion 0.5  dextrose 5%. 1000  fentaNYL   Infusion. 0.5  gabapentin 300  influenza   Vaccine 0.5  insulin regular Infusion 1  ipratropium 17 MICROgram(s) HFA Inhaler 4  lactated ringers Bolus 500  meropenem  IVPB   meropenem  IVPB 1000  methylPREDNISolone sodium succinate Injectable 40  multivitamin/minerals 1  mupirocin 2% Ointment 1  norepinephrine Infusion 0.05  pantoprazole   Suspension 40  vancomycin  IVPB 750      WEIGHT  Weight (kg): 72.2 (02-26-20 @ 11:30)  Creatinine, Serum: 0.5 mg/dL (03-06-20 @ 04:30)      ANTIBIOTICS:  caspofungin IVPB      caspofungin IVPB 50 milliGRAM(s) IV Intermittent every 24 hours  meropenem  IVPB      meropenem  IVPB 1000 milliGRAM(s) IV Intermittent every 8 hours  vancomycin  IVPB 750 milliGRAM(s) IV Intermittent every 12 hours      All available historical records have been reviewed

## 2020-03-06 NOTE — PROGRESS NOTE ADULT - ASSESSMENT
73 y/o female with pmhx of asthma, HTN,  autoimmune hepatitis, heterozygous prothrombin gene mutation with hx of PE and DVT on coumadin admitted for SOB     Acute hypoxic resp failure with hypoxia due to influenza, HCAP  Septic shock in immunocompromised patient, Severe sepsis present on admission   - completed course of treatment- antibiotics and antiviral completed for initial infection  - MRSA VAP treated with Zyvox for 10 days   - remains intubated and sedated  - Readmitted to the ICU with PE, HCAP pneumonia, GNR on BAL : Klebsiella and rare yeast  - Septic - back on pressors  - GFF placed due to PE   - remains on IV Solu-medrol  - ID f/u noted, on Meropenem, Vancomycin and restarted on Caspofungin    Multiple Skin tears/ wounds  - local care  - elevate extremities  - burn evaluation    Candedemia  - treated  - cultures negative x2  - 2D echo no evidence of vegetation   - ophth evaluation noted, no clinical evidence, low suspicion for ocular fungemia/endogenous endophthalmitis   - completed treatment  2/27    REJI on CKD 3  - creatinine normal  - urine output noted  - continue to monitor    Hypokalemia  - supplement    Acute Blood Loss Anemia  - had rectal tube earlier on this adm which was discontinued due to ulcer  - on Protonix  - has had multiple PRBC's this adm  - no further evidence of rectal bleeding    Autoimmune hepatitis  - On prednisone 60 mg PO qd and Tacrolimus at home  - now on Solu-medrol  - Tacrolimus held    HTN by hx  - off Labetolol    Heterozygous prothrombin gene mutation with hx of PE and DVT on coumadin:  - Coumadin had been held for alveolar hemorrhage     Protein Malnutrition  - Alb noted, trending back up  - tolerating enteral feedings  - monitor    Diet: Enteral feedings   GI PPx: Protonix  DVT PPx: sequentials  Activity: bedrest  Dispo: readmitted to hospital from  rehab at [facility], remains ICU      Continue supportive measures, patient remains acutely ill,  Overall prognosis poor. Case discussed with  and son, more aware of severity of condition.  states he will make decision soon. He is aware of his wife's wishes

## 2020-03-06 NOTE — PROGRESS NOTE ADULT - ASSESSMENT
IMPRESSION:    Acute hypoxic respiratory failure likely VTE SP GFF   Klebsiella in BAL treated   DAH resolving  Candidemia SP therapy   HO Autoimmune hepatitis on tacrolimus and solumedrol  Possible recurrent septic shock      PLAN:    CNS:  Keep sedated for now.  Needs paralysis     HEENT: ET care.  Oral care.      PULMONARY:  HOB @ 45 degrees. Solumedrol 60 mg q24h.  I:E 1.  Wean O2.  Repeat ABG after adequate paralysis     CARDIOVASCULAR:  I=O.  Wean Levophed     GI: GI prophylaxis.  Hold feeding.  CDiff     RENAL:  Follow up lytes. Replete as needed.     INFECTIOUS DISEASE: Follow up cultures.  FU Vanc levels.  DW ID.  Continue ABX     HEMATOLOGICAL:  DVT prophylaxis.  FU CBC and coags     ENDOCRINE:  Follow up FS.  Insulin protocol if needed.    MUSCULOSKELETAL: Bed rest     Prognosis: Very Poor prognosis overall

## 2020-03-06 NOTE — PROGRESS NOTE ADULT - SUBJECTIVE AND OBJECTIVE BOX
Patient is a 75y old  Female who presents with a chief complaint of SOB and desaturation (06 Mar 2020 06:52)        Over Night Events:  Remains critically ill on MV.  Sedated.  On Levophed 0.17.  SP bronchoscopy         ROS:     CONSTITUTIONAL:   no fever   no chills.  no weight gain   no weight loss    EYES:   no discharge,   no pain  no redness,   no visual changes.    ENT:   Ears: no ear pain and no hearing problems.  Nose: no nasal congestion and no nasal drainage.  Mouth/Throat: no dysphagia,  no hoarseness and no throat pain.  Neck: no lumps, no pain, no stiffness and no swollen glands.     CARDIOVASCULAR:   no chest pain,   no swelling  no palpitations  no syncope    RESPIRATORY:  Per HPI    GASTROINTESTINAL:   no abdominal pain,   no constipation,   no diarrhea,   no vomiting.    GENITOURINARY:  no dysuria,   no frequency,   no urgency  no hematuria.    MUSCULOSKELETAL:   no back pain,   no musculoskeletal pain,  no weakness.    SKIN:   no jaundice,   no lesions,   no pruritis,   no rashes.    NEURO:   Sedated     PSYCHIATRIC:   Sedated     ALLERGIC/IMMUNOLOGIC:   No active allergic or immunologic issues        PHYSICAL EXAM    ICU Vital Signs Last 24 Hrs  T(C): 37.7 (06 Mar 2020 04:00), Max: 37.7 (06 Mar 2020 04:00)  T(F): 99.9 (06 Mar 2020 04:00), Max: 99.9 (06 Mar 2020 04:00)  HR: 94 (06 Mar 2020 07:00) (90 - 132)  BP: 99/60 (05 Mar 2020 16:00) (44/32 - 183/94)  BP(mean): 68 (05 Mar 2020 16:00) (35 - 136)  ABP: 108/44 (06 Mar 2020 07:00) (108/44 - 146/58)  ABP(mean): 62 (06 Mar 2020 07:00) (62 - 84)  RR: 28 (06 Mar 2020 07:00) (18 - 38)  SpO2: 99% (06 Mar 2020 07:00) (92% - 100%)      CONSTITUTIONAL:   Ill appearing.  Well nourished.  NAD    ENT:   Airway patent,   Mouth with normal mucosa.   No thrush    EYES:   Pupils equal,   Round and reactive to light.    CARDIAC:   Normal rate,   Regular rhythm.    No edema      Vascular:  Normal systolic impulse  No Carotid bruits    RESPIRATORY:   No wheezing  Bilateral BS  Normal chest expansion  Not tachypneic,  No use of accessory muscles    GASTROINTESTINAL:  Abdomen soft,   Non-tender,   No guarding,   + BS    GENITOURINARY  normal genitalia for sex  edema    MUSCULOSKELETAL:   Range of motion is not limited,  No muscle or joint tenderness  No clubbing, cyanosis    NEUROLOGICAL:   Sedated    SKIN:   Skin normal color for race,   Warm and dry  No evidence of rash.    PSYCHIATRIC:   Sedated     HEME LYMPH:   No cervical  lymphadenopathy.  no inguinal lymphadenopathy      03-05-20 @ 07:01  -  03-06-20 @ 07:00  --------------------------------------------------------  IN:    dexmedetomidine Infusion: 414.6 mL    Enteral Tube Flush: 240 mL    fentaNYL Infusion.: 515.4 mL    Free Water: 1000 mL    insulin regular Infusion: 93 mL    IV PiggyBack: 350 mL    Lactated Ringers IV Bolus: 500 mL    midazolam Infusion: 124.2 mL    norepinephrine Infusion: 288.5 mL    Osmolite: 720 mL  Total IN: 4245.7 mL    OUT:    Indwelling Catheter - Urethral: 86 mL    Ureteral Catheter: 705 mL  Total OUT: 791 mL    Total NET: 3454.7 mL          LABS:                            8.6    6.56  )-----------( 69       ( 06 Mar 2020 04:30 )             27.6                                               03-06    137  |  102  |  37<H>  ----------------------------<  135<H>  4.3   |  28  |  0.5<L>    Ca    7.2<L>      06 Mar 2020 04:30  Mg     1.7     03-06    TPro  4.5<L>  /  Alb  1.4<L>  /  TBili  0.8  /  DBili  x   /  AST  33  /  ALT  55<H>  /  AlkPhos  287<H>  03-06      PT/INR - ( 06 Mar 2020 04:30 )   PT: 14.60 sec;   INR: 1.27 ratio         PTT - ( 06 Mar 2020 04:30 )  PTT:29.2 sec                                                                                     LIVER FUNCTIONS - ( 06 Mar 2020 04:30 )  Alb: 1.4 g/dL / Pro: 4.5 g/dL / ALK PHOS: 287 U/L / ALT: 55 U/L / AST: 33 U/L / GGT: x                                                  Culture - Fungal, Bronchial (collected 05 Mar 2020 10:34)  Source: .Bronchial None  Preliminary Report (06 Mar 2020 06:42):    Testing in progress    Culture - Bronchial (collected 05 Mar 2020 10:34)  Source: .Bronchial None  Gram Stain (06 Mar 2020 06:31):    Few polymorphonuclear leukocytes seen per low power field    Rare Squamous epithelial cells seen per low power field    Numerous Gram Variable Rods seen per oil power field    Few Gram positive cocci in pairs seen per oil power field                                                   Mode: AC/ CMV (Assist Control/ Continuous Mandatory Ventilation)  RR (machine): 26  TV (machine): 400  FiO2: 80  PEEP: 10  ITime: 1  MAP: 22  PIP: 29                                      ABG - ( 06 Mar 2020 03:33 )  pH, Arterial: 7.38  pH, Blood: x     /  pCO2: 50    /  pO2: 68    / HCO3: 29    / Base Excess: 3.4   /  SaO2: 94                  MEDICATIONS  (STANDING):  ALBUTerol    90 MICROgram(s) HFA Inhaler 4 Puff(s) Inhalation every 6 hours  calcitriol  Solution 0.25 MICROGram(s) Oral daily  calcium carbonate    500 mG (Tums) Chewable 3 Tablet(s) Chew every 8 hours  caspofungin IVPB      caspofungin IVPB 50 milliGRAM(s) IV Intermittent every 24 hours  chlorhexidine 0.12% Liquid 15 milliLiter(s) Oral Mucosa two times a day  chlorhexidine 4% Liquid 1 Application(s) Topical <User Schedule>  cyanocobalamin 1000 MICROGram(s) Oral daily  dexMEDEtomidine Infusion 0.5 MICROgram(s)/kG/Hr (9.025 mL/Hr) IV Continuous <Continuous>  dextrose 5%. 1000 milliLiter(s) (50 mL/Hr) IV Continuous <Continuous>  fentaNYL   Infusion. 0.5 MICROgram(s)/kG/Hr (3.61 mL/Hr) IV Continuous <Continuous>  gabapentin 300 milliGRAM(s) Oral at bedtime  influenza   Vaccine 0.5 milliLiter(s) IntraMuscular once  insulin regular Infusion 1 Unit(s)/Hr (1 mL/Hr) IV Continuous <Continuous>  ipratropium 17 MICROgram(s) HFA Inhaler 4 Puff(s) Inhalation every 6 hours  lactated ringers Bolus 500 milliLiter(s) IV Bolus once  meropenem  IVPB      meropenem  IVPB 1000 milliGRAM(s) IV Intermittent every 8 hours  methylPREDNISolone sodium succinate Injectable 40 milliGRAM(s) IV Push daily  midazolam Infusion 0.02 mG/kG/Hr (1.444 mL/Hr) IV Continuous <Continuous>  multivitamin/minerals 1 Tablet(s) Oral daily  mupirocin 2% Ointment 1 Application(s) Topical two times a day  norepinephrine Infusion 0.05 MICROgram(s)/kG/Min (3.384 mL/Hr) IV Continuous <Continuous>  pantoprazole   Suspension 40 milliGRAM(s) Oral before breakfast  vancomycin  IVPB 750 milliGRAM(s) IV Intermittent every 12 hours    MEDICATIONS  (PRN):  acetaminophen   Tablet .. 650 milliGRAM(s) Oral every 6 hours PRN Temp greater or equal to 38C (100.4F)  glucagon  Injectable 1 milliGRAM(s) IntraMuscular once PRN Glucose LESS THAN 70 milligrams/deciliter      New X-rays reviewed:                                                                                  ECHO    CXR interpreted by me:  ET OG OK.  Bilateral infiltrates

## 2020-03-06 NOTE — PROGRESS NOTE ADULT - SUBJECTIVE AND OBJECTIVE BOX
PATIENT:  DONI PATRICK  [id number]    CHIEF COMPLAINT:  Patient is a 75y old  Female who presents with a chief complaint of SOB and desaturation (05 Mar 2020 22:42)      INTERVAL HISTORYOVERNIGHT EVENTS:  intubated and on pressors and sedation.        MEDICATIONS:  MEDICATIONS  (STANDING):  ALBUTerol    90 MICROgram(s) HFA Inhaler 4 Puff(s) Inhalation every 6 hours  calcitriol  Solution 0.25 MICROGram(s) Oral daily  calcium carbonate    500 mG (Tums) Chewable 3 Tablet(s) Chew every 8 hours  caspofungin IVPB      caspofungin IVPB 50 milliGRAM(s) IV Intermittent every 24 hours  chlorhexidine 0.12% Liquid 15 milliLiter(s) Oral Mucosa two times a day  chlorhexidine 4% Liquid 1 Application(s) Topical <User Schedule>  cyanocobalamin 1000 MICROGram(s) Oral daily  dexMEDEtomidine Infusion 0.5 MICROgram(s)/kG/Hr (9.025 mL/Hr) IV Continuous <Continuous>  dextrose 5%. 1000 milliLiter(s) (50 mL/Hr) IV Continuous <Continuous>  fentaNYL   Infusion. 0.5 MICROgram(s)/kG/Hr (3.61 mL/Hr) IV Continuous <Continuous>  gabapentin 300 milliGRAM(s) Oral at bedtime  influenza   Vaccine 0.5 milliLiter(s) IntraMuscular once  insulin regular Infusion 1 Unit(s)/Hr (1 mL/Hr) IV Continuous <Continuous>  ipratropium 17 MICROgram(s) HFA Inhaler 4 Puff(s) Inhalation every 6 hours  lactated ringers Bolus 500 milliLiter(s) IV Bolus once  meropenem  IVPB      meropenem  IVPB 1000 milliGRAM(s) IV Intermittent every 8 hours  methylPREDNISolone sodium succinate Injectable 40 milliGRAM(s) IV Push daily  midazolam Infusion 0.02 mG/kG/Hr (1.444 mL/Hr) IV Continuous <Continuous>  multivitamin/minerals 1 Tablet(s) Oral daily  mupirocin 2% Ointment 1 Application(s) Topical two times a day  norepinephrine Infusion 0.05 MICROgram(s)/kG/Min (3.384 mL/Hr) IV Continuous <Continuous>  pantoprazole   Suspension 40 milliGRAM(s) Oral before breakfast  vancomycin  IVPB 750 milliGRAM(s) IV Intermittent every 12 hours    MEDICATIONS  (PRN):  acetaminophen   Tablet .. 650 milliGRAM(s) Oral every 6 hours PRN Temp greater or equal to 38C (100.4F)  glucagon  Injectable 1 milliGRAM(s) IntraMuscular once PRN Glucose LESS THAN 70 milligrams/deciliter      ALLERGIES:  Allergies    No Known Allergies    Intolerances        OBJECTIVE:  ICU Vital Signs Last 24 Hrs  T(C): 37.7 (06 Mar 2020 04:00), Max: 37.7 (06 Mar 2020 04:00)  T(F): 99.9 (06 Mar 2020 04:00), Max: 99.9 (06 Mar 2020 04:00)  HR: 100 (06 Mar 2020 04:00) (90 - 132)  BP: 99/60 (05 Mar 2020 16:00) (44/32 - 183/94)  BP(mean): 68 (05 Mar 2020 16:00) (35 - 136)  ABP: 118/50 (06 Mar 2020 04:00) (110/50 - 132/52)  ABP(mean): 66 (06 Mar 2020 04:00) (64 - 72)  RR: 19 (06 Mar 2020 04:00) (18 - 38)  SpO2: 97% (06 Mar 2020 04:00) (92% - 100%)    Mode: AC/ CMV (Assist Control/ Continuous Mandatory Ventilation)  RR (machine): 26  TV (machine): 400  FiO2: 80  PEEP: 10  ITime: 1  MAP: 22  PIP: 29    Adult Advanced Hemodynamics Last 24 Hrs  CVP(mm Hg): --  CVP(cm H2O): --  CO: --  CI: --  PA: --  PA(mean): --  PCWP: --  SVR: --  SVRI: --  PVR: --  PVRI: --  CAPILLARY BLOOD GLUCOSE      POCT Blood Glucose.: 209 mg/dL (06 Mar 2020 06:29)  POCT Blood Glucose.: 132 mg/dL (06 Mar 2020 04:12)  POCT Blood Glucose.: 193 mg/dL (06 Mar 2020 02:04)  POCT Blood Glucose.: 287 mg/dL (06 Mar 2020 00:12)  POCT Blood Glucose.: 205 mg/dL (05 Mar 2020 21:59)  POCT Blood Glucose.: 265 mg/dL (05 Mar 2020 20:14)  POCT Blood Glucose.: 187 mg/dL (05 Mar 2020 17:01)  POCT Blood Glucose.: 284 mg/dL (05 Mar 2020 15:13)  POCT Blood Glucose.: 349 mg/dL (05 Mar 2020 11:49)    CAPILLARY BLOOD GLUCOSE      POCT Blood Glucose.: 209 mg/dL (06 Mar 2020 06:29)    I&O's Summary    04 Mar 2020 07:01  -  05 Mar 2020 07:00  --------------------------------------------------------  IN: 2371.8 mL / OUT: 2695 mL / NET: -323.2 mL    05 Mar 2020 07:01  -  06 Mar 2020 06:52  --------------------------------------------------------  IN: 4126.8 mL / OUT: 611 mL / NET: 3515.8 mL      Daily     Daily     PHYSICAL EXAMINATION:  General: sedated  HEENT: PEERLA  Neurology: sedated, does not follow commands  Respiratory: b/l breath sounds  CV: RRR, S1S2, no murmurs, rubs or gallops  Abdominal: Soft, ND  Extremities: peripheral edema, + peripheral pulses  Incisions:   Tubes: intubated    LABS:  ABG - ( 06 Mar 2020 03:33 )  pH, Arterial: 7.38  pH, Blood: x     /  pCO2: 50    /  pO2: 68    / HCO3: 29    / Base Excess: 3.4   /  SaO2: 94                                      8.6    6.56  )-----------( 69       ( 06 Mar 2020 04:30 )             27.6     03-06    137  |  102  |  37<H>  ----------------------------<  135<H>  4.3   |  28  |  0.5<L>    Ca    7.2<L>      06 Mar 2020 04:30  Mg     1.7     03-06    TPro  4.5<L>  /  Alb  1.4<L>  /  TBili  0.8  /  DBili  x   /  AST  33  /  ALT  55<H>  /  AlkPhos  287<H>  03-06    LIVER FUNCTIONS - ( 06 Mar 2020 04:30 )  Alb: 1.4 g/dL / Pro: 4.5 g/dL / ALK PHOS: 287 U/L / ALT: 55 U/L / AST: 33 U/L / GGT: x           PT/INR - ( 06 Mar 2020 04:30 )   PT: 14.60 sec;   INR: 1.27 ratio         PTT - ( 06 Mar 2020 04:30 )  PTT:29.2 sec            TELEMETRY:     EKG:     IMAGING:

## 2020-03-06 NOTE — PROGRESS NOTE ADULT - ASSESSMENT
ASSESSMENT  75y/o F w/ hx of asthma, COPD not on home O2, autoimmune hepatitis on prednisone and tacrolimus, heterozygous prothrombin gene mutation with hx of PE and DVT on coumadin with recent hospitalization in January 2020 with a diagnosis of pneumonia presents for worsening SOB, hemoptysis and cough.    IMPRESSION  #GNR PNA- BAL 2/26 with   Numerous Klebsiella pneumoniae (panS)    s/p bronch 3/5, pending culture    Fungal cx from bronch -   Rare Yeast likely colonization    Acute hypoxemia, intubated 2/26, possible PE given +DVTs s/p Option Elite IVC filter via Right IJ access.  #Diarrhea   #1/16 Bronch cx AFB + 1/2 specimens    Possible MAC, given CT findings and +bronch specimen, would qualify for treatment per guidelines  #GIB  #Thrombocytopenia    continues post stopping linezolid   #Hx recent MRSA VAP    2/14 tracheal cx   Moderate Methicillin resistant Staphylococcus aureus    completed 8 day course, noted to have thrombocytopenia on linezolid  #Candidemia Fungitell: >500 (02-11-20 @ 11:21) likely CLABSI    2/11 BCX +, 2/12 BCX +, 2/13 BCX (-) RIJ 2/11. Groin a-line 2/4- removed 2/13     Fungitell: <31 (02-04-20 @ 04:40), Fungitell: 49 (02-01-20 @ 11:17)    Ophtho- no endophthalmitis   #Flu + AH1, Alveolar hemorrhage, ?superimposed atypical PNA (imaging not really consistent with bacterial PNA). Recent bronch 1/20 negative for PCP, Fungal, etc, previous bronch cx with yeast (likely oral contaminant) since GMS neg    2/3 Bronch   No organisms seen, 2/3 cytopath Rare atypical cells, few ciliated bronchial cells, alveolar macrophages and inflammatory cells, mainly neutrophils.    Strep urine Ag neg, serum crypto Ag neg, CMV PCR undetectable, AFB neg    Quantiferon indeterminate    Lactate Dehydrogenase, Serum: 607 (02.03.20 @ 04:30)    Procalcitonin, Serum: 1.86:    CTA chest showing no PE but showing interval development of multifocal bilateral groundglass opacities as well as new moderate to severe bronchiectasis in the right lung,   1/13 CT b/l GGOs, compatible with intersitial edema      During last hospitalization: bronch cx bacterial NG, +yeast, no ID, neg legionella, + MRSA PCR    MRSA PCR Result.: Positive (02-01-20 @ 20:40)  #Severe sepsis on admission P>90, WBC 19, RR>20, lactic acidosis Lactate, Blood: 2.9 mmol/L (01-31-20 @ 09:25)    afebrile   #Elevated Alk ph, transaminitis  #LLE wound, not infection     12/7 WCX MSSA, Kleb, bacteroides    8/2019 MRSA in a wound   #Immunosuppressed: autoimmune hepatitis on prednisone and tacrolimus    RECOMMENDATIONS  - f/u bronch 3/5 cultures   - if not receiving any stool softeners and >3 liquid stools in 24h, send Cdiff   - Vanc 750mg q12h IV (avoid linezolid as thrombocytopenia)  - Please check vanc trough 30 min prior to 4th dose this PM  - On Jorge 1g q8h IV  (D9 on abx 2/27-)   - Caspo 50mg q24h IV   - Repeat serum fungitell   - f/u AFB from 1/15 ID,  (possible MAC vs other NTM) , would hold off empiric therapy in the setting of acute illnesss  - grave prognosis    Spectra [phone]

## 2020-03-07 NOTE — PROGRESS NOTE ADULT - SUBJECTIVE AND OBJECTIVE BOX
ELDER, DONI  75y  Female  HPI:  73y/o F w/ hx of asthma, COPD not on home O2, autoimmune hepatitis on prednisone and tacrolimus, heterozygous prothrombin gene mutation with hx of PE and DVT on coumadin with recent hospitalization in January 2020 with a diagnosis of pneumonia presents for worsening SOB, hemoptysis and cough. Everything started yesterday night when patient was in bed, started to feel shortness of breath and had many episodes of hemoptysis with clots, she also had substernal chest pain non radiating, moderate in intensity that worsens on coughing. She denies fever but endorses chills. She also admits for lightheadedness that occurred yesterday, no HA, abd pain, dysuria or paresthesias. She had 2 loose bowel movements since yesterday with some blood in the stools that she attributes to her hemorrhoids. She stopped Coumadin couple of days ago since her INR was supratherapeutic. She is from [facility] short term and also mentions that her  and another family member have the flu and she was exposed to them. She did not have the flu shot this season.  In ED, temp was 100.1 rectally, tachycardic ( sinus) , she was saturating to 70s on non rebreather and her SaO2 went up to 95%. ABG showing respiratory alkalosis initially and then corrected after BIPAP placement and correction of RR.  CTA chest showing no PE but showing interval development of multifocal bilateral groundglass opacities as well as new moderate to severe bronchiectasis in the right lung. Findings are concerning for an acute infectious/inflammatory process. (31 Jan 2020 14:36)    MEDICATIONS  (STANDING):  ALBUTerol    90 MICROgram(s) HFA Inhaler 4 Puff(s) Inhalation every 6 hours  calcitriol  Solution 0.25 MICROGram(s) Oral daily  calcium carbonate    500 mG (Tums) Chewable 3 Tablet(s) Chew every 8 hours  chlorhexidine 0.12% Liquid 15 milliLiter(s) Oral Mucosa two times a day  chlorhexidine 4% Liquid 1 Application(s) Topical <User Schedule>  cyanocobalamin 1000 MICROGram(s) Oral daily  dexMEDEtomidine Infusion 0.5 MICROgram(s)/kG/Hr (9.025 mL/Hr) IV Continuous <Continuous>  dextrose 5%. 1000 milliLiter(s) (50 mL/Hr) IV Continuous <Continuous>  fentaNYL   Infusion. 0.5 MICROgram(s)/kG/Hr (3.61 mL/Hr) IV Continuous <Continuous>  gabapentin 300 milliGRAM(s) Oral at bedtime  influenza   Vaccine 0.5 milliLiter(s) IntraMuscular once  insulin regular Infusion 1 Unit(s)/Hr (1 mL/Hr) IV Continuous <Continuous>  ipratropium 17 MICROgram(s) HFA Inhaler 4 Puff(s) Inhalation every 6 hours  lactated ringers Bolus 500 milliLiter(s) IV Bolus once  meropenem  IVPB      meropenem  IVPB 1000 milliGRAM(s) IV Intermittent every 8 hours  methylPREDNISolone sodium succinate Injectable 40 milliGRAM(s) IV Push daily  multivitamin/minerals 1 Tablet(s) Oral daily  mupirocin 2% Ointment 1 Application(s) Topical two times a day  norepinephrine Infusion 0.05 MICROgram(s)/kG/Min (3.384 mL/Hr) IV Continuous <Continuous>  pantoprazole   Suspension 40 milliGRAM(s) Oral before breakfast    MEDICATIONS  (PRN):  acetaminophen   Tablet .. 650 milliGRAM(s) Oral every 6 hours PRN Temp greater or equal to 38C (100.4F)  glucagon  Injectable 1 milliGRAM(s) IntraMuscular once PRN Glucose LESS THAN 70 milligrams/deciliter    INTERVAL EVENTS: Patient seen today without distress. Patient remains intubated, sedated, paralyzed and on pressors.    T(C): 36.4 (03-07-20 @ 08:00), Max: 37.8 (03-07-20 @ 04:00)  HR: 68 (03-07-20 @ 09:00) (68 - 116)  BP: --  RR: 30 (03-07-20 @ 09:00) (25 - 31)  SpO2: 99% (03-07-20 @ 09:00) (96% - 100%)  Wt(kg): --Vital Signs Last 24 Hrs  T(C): 36.4 (07 Mar 2020 08:00), Max: 37.8 (07 Mar 2020 04:00)  T(F): 97.5 (07 Mar 2020 08:00), Max: 100.1 (07 Mar 2020 04:00)  HR: 68 (07 Mar 2020 09:00) (68 - 116)  BP: --  BP(mean): --  RR: 30 (07 Mar 2020 09:00) (25 - 31)  SpO2: 99% (07 Mar 2020 09:00) (96% - 100%)    PHYSICAL EXAM:  GENERAL: NAD, intubated  CHEST/LUNG: Coarse BS  HEART: S1, S2, Regular rate and rhythm  ABDOMEN: Soft,  Nondistended; Bowel sounds present  EXTREMITIES: ++ edema  SKIN: open lesions covered    LABS:                        7.6    5.59  )-----------( 64       ( 07 Mar 2020 04:00 )             24.1             03-07    131<L>  |  98  |  48<H>  ----------------------------<  290<H>  4.2   |  27  |  <0.5<L>    Ca    7.0<L>      07 Mar 2020 04:00  Mg     1.7     03-07    TPro  4.3<L>  /  Alb  1.4<L>  /  TBili  0.6  /  DBili  x   /  AST  23  /  ALT  41  /  AlkPhos  324<H>  03-07    LIVER FUNCTIONS - ( 07 Mar 2020 04:00 )  Alb: 1.4 g/dL / Pro: 4.3 g/dL / ALK PHOS: 324 U/L / ALT: 41 U/L / AST: 23 U/L / GGT: x                   PT/INR - ( 07 Mar 2020 04:20 )   PT: 13.40 sec;   INR: 1.17 ratio       PTT - ( 07 Mar 2020 04:20 )  PTT:28.9 sec      ABG - ( 07 Mar 2020 03:12 )  pH, Arterial: 7.29  pH, Blood: x     /  pCO2: 58    /  pO2: 79    / HCO3: 28    / Base Excess: 1.2   /  SaO2: 96            Culture - Fungal, Bronchial (collected 05 Mar 2020 10:34)  Source: .Bronchial None  Preliminary Report (06 Mar 2020 06:42):    Testing in progress    Culture - Acid Fast - Bronchial w/Smear (collected 05 Mar 2020 10:34)  Source: .Bronchial None    Culture - Bronchial (collected 05 Mar 2020 10:34)  Source: .Bronchial None  Gram Stain (06 Mar 2020 06:31):    Few polymorphonuclear leukocytes seen per low power field    Rare Squamous epithelial cells seen per low power field    Numerous Gram Variable Rods seen per oil power field    Few Gram positive cocci in pairs seen per oil power field  Preliminary Report (06 Mar 2020 20:55):    Normal Respiratory Nikki present      RADIOLOGY & ADDITIONAL TESTS:  < from: Xray Chest 1 View- PORTABLE-Routine (03.07.20 @ 05:41) >  Findings:    Support devices: Right IJ central venous catheter is seen with tip in the SVC. Endotracheal tube is seen with tip just above the baljinder. An enteric tube is seen projecting under the diaphragm.    Cardiac/mediastinum/hilum: Stable cardiomegaly.    Lung parenchyma/Pleura: Diffuse opacifications, unchanged, and pleural-based calcifications bilaterally.    Skeleton/soft tissues: Stable.    Impression:      Diffuse bilateral opacifications and bilateral pleural-based calcifications, unchanged.    Lines and tubes,appropriate positions.                < end of copied text >

## 2020-03-07 NOTE — PROGRESS NOTE ADULT - SUBJECTIVE AND OBJECTIVE BOX
Patient is a 75y old  Female who presents with a chief complaint of SOB and desaturation (06 Mar 2020 10:51)        Over Night Events:  Remains critically ill on MV.  Sedated and paralyzed.  On Levophed 0.03.          ROS:     CONSTITUTIONAL:   no fever   no chills.  no weight gain   no weight loss    EYES:   no discharge,   no pain  no redness,   no visual changes.    ENT:   Ears: no ear pain and no hearing problems.  Nose: no nasal congestion and no nasal drainage.  Mouth/Throat: no dysphagia,  no hoarseness and no throat pain.  Neck: no lumps, no pain, no stiffness and no swollen glands.     CARDIOVASCULAR:   no chest pain,   no swelling  no palpitaions  no syncope    RESPIRATORY:  Per HPI    GASTROINTESTINAL:   no abdominal pain,   no constipation,   no diarrhea,   no vomiting.    GENITOURINARY:  no dysuria,   no frequency,   no urgency  no hematuria.    MUSCULOSKELETAL:   no back pain,   no musculoskeletal pain,  no weakness.    SKIN:   no jaundice,   no lesions,   no pruritis,   no rashes.    NEURO:   Sedated and paralyzed     PSYCHIATRIC:   no known mental health issues  no anxiety  no depression    ALLERGIC/IMMUNOLOGIC:   No active allergic or immunologic issues        PHYSICAL EXAM    ICU Vital Signs Last 24 Hrs  T(C): 37.8 (07 Mar 2020 04:00), Max: 37.8 (07 Mar 2020 04:00)  T(F): 100.1 (07 Mar 2020 04:00), Max: 100.1 (07 Mar 2020 04:00)  HR: 78 (07 Mar 2020 07:00) (74 - 116)  BP: --  BP(mean): --  ABP: 138/60 (07 Mar 2020 07:00) (90/42 - 182/70)  ABP(mean): 84 (07 Mar 2020 07:00) (56 - 112)  RR: 29 (07 Mar 2020 07:00) (25 - 31)  SpO2: 97% (07 Mar 2020 07:00) (96% - 100%)      CONSTITUTIONAL:   Ill appearing.  Well nourished.  NAD    ENT:   Airway patent,   Mouth with normal mucosa.   No thrush    EYES:   Pupils equal,   Round and reactive to light.    CARDIAC:   Normal rate,   Regular rhythm.    edema      Vascular:  Normal systolic impulse  No Carotid bruits    RESPIRATORY:   No wheezing  Bilateral BS  Normal chest expansion  Not tachypneic,  No use of accessory muscles    GASTROINTESTINAL:  Abdomen soft,   Non-tender,   No guarding,   + BS    GENITOURINARY  normal genitalia for sex   edema    MUSCULOSKELETAL:   Range of motion is not limited,  No muscle or joint tenderness  No clubbing, cyanosis    NEUROLOGICAL:   Sedated and paralyzed     SKIN:   Skin normal color for race,   Warm and dry and intact.   No evidence of rash.    PSYCHIATRIC:   Sedated and paralyzed     HEME LYMPH:   No cervical  lymphadenopathy.  no inguinal lymphadenopathy      03-06-20 @ 07:01  -  03-07-20 @ 07:00  --------------------------------------------------------  IN:    cisatracurium Infusion: 136.8 mL    dexmedetomidine Infusion: 265 mL    Enteral Tube Flush: 500 mL    fentaNYL Infusion.: 256.3 mL    Free Water: 500 mL    insulin regular Infusion: 149 mL    IV PiggyBack: 610 mL    norepinephrine Infusion: 145 mL    Osmolite: 960 mL  Total IN: 3522.1 mL    OUT:    Ureteral Catheter: 60 mL  Total OUT: 60 mL    Total NET: 3462.1 mL          LABS:                            7.6    5.59  )-----------( 64       ( 07 Mar 2020 04:00 )             24.1                                               03-07    131<L>  |  98  |  48<H>  ----------------------------<  290<H>  4.2   |  27  |  <0.5<L>    Ca    7.0<L>      07 Mar 2020 04:00  Mg     1.7     03-07    TPro  4.3<L>  /  Alb  1.4<L>  /  TBili  0.6  /  DBili  x   /  AST  23  /  ALT  41  /  AlkPhos  324<H>  03-07      PT/INR - ( 07 Mar 2020 04:20 )   PT: 13.40 sec;   INR: 1.17 ratio         PTT - ( 07 Mar 2020 04:20 )  PTT:28.9 sec                                                                                     LIVER FUNCTIONS - ( 07 Mar 2020 04:00 )  Alb: 1.4 g/dL / Pro: 4.3 g/dL / ALK PHOS: 324 U/L / ALT: 41 U/L / AST: 23 U/L / GGT: x                                                  Culture - Fungal, Bronchial (collected 05 Mar 2020 10:34)  Source: .Bronchial None  Preliminary Report (06 Mar 2020 06:42):    Testing in progress    Culture - Acid Fast - Bronchial w/Smear (collected 05 Mar 2020 10:34)  Source: .Bronchial None    Culture - Bronchial (collected 05 Mar 2020 10:34)  Source: .Bronchial None  Gram Stain (06 Mar 2020 06:31):    Few polymorphonuclear leukocytes seen per low power field    Rare Squamous epithelial cells seen per low power field    Numerous Gram Variable Rods seen per oil power field    Few Gram positive cocci in pairs seen per oil power field  Preliminary Report (06 Mar 2020 20:55):    Normal Respiratory Nikki present                                                   Mode: AC/ CMV (Assist Control/ Continuous Mandatory Ventilation)  RR (machine): 26  TV (machine): 400  FiO2: 60  PEEP: 10  ITime: 1  MAP: 20  PIP: 33                                      ABG - ( 07 Mar 2020 03:12 )  pH, Arterial: 7.29  pH, Blood: x     /  pCO2: 58    /  pO2: 79    / HCO3: 28    / Base Excess: 1.2   /  SaO2: 96                  MEDICATIONS  (STANDING):  ALBUTerol    90 MICROgram(s) HFA Inhaler 4 Puff(s) Inhalation every 6 hours  calcitriol  Solution 0.25 MICROGram(s) Oral daily  calcium carbonate    500 mG (Tums) Chewable 3 Tablet(s) Chew every 8 hours  caspofungin IVPB      caspofungin IVPB 50 milliGRAM(s) IV Intermittent every 24 hours  chlorhexidine 0.12% Liquid 15 milliLiter(s) Oral Mucosa two times a day  chlorhexidine 4% Liquid 1 Application(s) Topical <User Schedule>  cisatracurium Infusion 3 MICROgram(s)/kG/Min (12.996 mL/Hr) IV Continuous <Continuous>  cyanocobalamin 1000 MICROGram(s) Oral daily  dexMEDEtomidine Infusion 0.5 MICROgram(s)/kG/Hr (9.025 mL/Hr) IV Continuous <Continuous>  dextrose 5%. 1000 milliLiter(s) (50 mL/Hr) IV Continuous <Continuous>  fentaNYL   Infusion. 0.5 MICROgram(s)/kG/Hr (3.61 mL/Hr) IV Continuous <Continuous>  gabapentin 300 milliGRAM(s) Oral at bedtime  influenza   Vaccine 0.5 milliLiter(s) IntraMuscular once  insulin regular Infusion 1 Unit(s)/Hr (1 mL/Hr) IV Continuous <Continuous>  ipratropium 17 MICROgram(s) HFA Inhaler 4 Puff(s) Inhalation every 6 hours  lactated ringers Bolus 500 milliLiter(s) IV Bolus once  meropenem  IVPB      meropenem  IVPB 1000 milliGRAM(s) IV Intermittent every 8 hours  methylPREDNISolone sodium succinate Injectable 40 milliGRAM(s) IV Push daily  multivitamin/minerals 1 Tablet(s) Oral daily  mupirocin 2% Ointment 1 Application(s) Topical two times a day  norepinephrine Infusion 0.05 MICROgram(s)/kG/Min (3.384 mL/Hr) IV Continuous <Continuous>  pantoprazole   Suspension 40 milliGRAM(s) Oral before breakfast  vancomycin  IVPB 500 milliGRAM(s) IV Intermittent every 12 hours    MEDICATIONS  (PRN):  acetaminophen   Tablet .. 650 milliGRAM(s) Oral every 6 hours PRN Temp greater or equal to 38C (100.4F)  glucagon  Injectable 1 milliGRAM(s) IntraMuscular once PRN Glucose LESS THAN 70 milligrams/deciliter      New X-rays reviewed:                                                                                  ECHO    CXR interpreted by me:  ET OG OK.  Bilateral infiltrates

## 2020-03-07 NOTE — PROGRESS NOTE ADULT - ASSESSMENT
75 y/o female with pmhx of asthma, HTN,  autoimmune hepatitis, heterozygous prothrombin gene mutation with hx of PE and DVT on coumadin admitted for SOB     Acute hypoxic resp failure with hypoxia due to influenza, HCAP  Septic shock in immunocompromised patient, Severe sepsis present on admission   - completed course of treatment- antibiotics and antiviral completed for initial infection  - MRSA VAP treated with Zyvox for 10 days   - remains intubated and sedated  - Readmitted to the ICU with PE, HCAP pneumonia, GNR on BAL : Klebsiella and rare yeast  - Septic - remains on pressors  - GFF placed due to PE   - remains on IV Solu-medrol  - ID f/u noted, on Meropenem, Vancomycin and restarted on Caspofungin  - vented, FIO2 down to 60%    Multiple Skin tears/ wounds  - local care  - elevate extremities  - burn evaluation    Candedemia  - treated  - cultures negative x2  - 2D echo no evidence of vegetation   - ophth evaluation noted, no clinical evidence, low suspicion for ocular fungemia/endogenous endophthalmitis   - completed treatment  2/27    REJI on CKD 3  - creatinine normal  - urine output noted  - continue to monitor    Hypokalemia  - supplement    Acute Blood Loss Anemia  - had rectal tube earlier on this adm which was discontinued due to ulcer  - on Protonix  - has had multiple PRBC's this adm  - no further evidence of rectal bleeding    Thrombocytopenia  - drug related  - no evidence of bleeding    Autoimmune hepatitis  - On prednisone 60 mg PO qd and Tacrolimus at home  - now on Solu-medrol  - Tacrolimus held    HTN by hx  - off Labetolol    Heterozygous prothrombin gene mutation with hx of PE and DVT on coumadin:  - Coumadin had been held for alveolar hemorrhage     Protein Malnutrition  - Alb noted, trending back up  - tolerating enteral feedings  - monitor    Diet: Enteral feedings   GI PPx: Protonix  DVT PPx: sequentials  Activity: bedrest  Dispo: readmitted to hospital from  rehab at [facility], remains ICU      Continue supportive measures, patient remains acutely ill,  Overall prognosis poor. Case discussed with  and son, more aware of severity of condition.  states he will make decision soon. He is aware of his wife's wishes

## 2020-03-07 NOTE — PROGRESS NOTE ADULT - ASSESSMENT
IMPRESSION:    Acute hypoxic respiratory failure likely VTE SP GFF   Klebsiella in BAL treated   DAH resolving  Candidemia SP therapy   HO Autoimmune hepatitis on tacrolimus and solumedrol  Possible recurrent septic shock secondary to pneumonia       PLAN:    CNS:  DC paralysis and then SAT     HEENT: ET care.  Oral care.      PULMONARY:  HOB @ 45 degrees. Continue Solumedrol.  Wean O2.      CARDIOVASCULAR:  I=O.  Wean Levophed     GI: GI prophylaxis.  FU Cdiff    RENAL:  Follow up lytes. Replete as needed.     INFECTIOUS DISEASE: Follow up cultures. DC Vanc.  Continue Meropenem.  FU BAL.     HEMATOLOGICAL:  DVT prophylaxis.  FU CBC and coags     ENDOCRINE:  Follow up FS.  Insulin protocol if needed.    MUSCULOSKELETAL: Bed rest     Prognosis: Very Poor prognosis overall

## 2020-03-08 NOTE — PROGRESS NOTE ADULT - ASSESSMENT
75 y/o female with pmhx of asthma, HTN,  autoimmune hepatitis, heterozygous prothrombin gene mutation with hx of PE and DVT on coumadin admitted for SOB     Acute hypoxic resp failure with hypoxia due to influenza, HCAP  Septic shock in immunocompromised patient, Severe sepsis present on admission   - completed course of treatment- antibiotics and antiviral completed for initial infection  - MRSA VAP treated with Zyvox for 10 days   - remains intubated, sedation  - Readmitted to the ICU with PE, HCAP pneumonia, GNR on BAL : Klebsiella and rare yeast  - Septic - remains on pressors  - GFF placed due to PE   - remains on IV Solu-medrol  - ID f/u noted, on Meropenem, Vancomycin and restarted on Caspofungin  - vented, FIO2 at 60%, PEEP 10    Multiple Skin tears/ wounds  - local care  - elevate extremities    Candedemia  - treated  - cultures negative x2  - 2D echo no evidence of vegetation   - ophth evaluation noted, no clinical evidence, low suspicion for ocular fungemia/endogenous endophthalmitis   - completed treatment  2/27    REJI on CKD 3  - creatinine normal  - urine output noted  - continue to monitor    Hyponatremia new  - medication related?  - repeat labs    Hypokalemia  - supplemented    Acute Blood Loss Anemia  - had rectal tube earlier on this adm which was discontinued due to ulcer  - on Protonix  - has had multiple PRBC's this adm  - no further evidence of rectal bleeding    Thrombocytopenia  - drug related  - no evidence of bleeding    Autoimmune hepatitis  - On prednisone 60 mg PO qd and Tacrolimus at home  - now on Solu-medrol tapppered  - Tacrolimus held    HTN by hx  - off Labetolol    Heterozygous prothrombin gene mutation with hx of PE and DVT on coumadin:  - Coumadin had been held for alveolar hemorrhage     Protein Malnutrition  - Alb noted, trending back up  - tolerating enteral feedings  - monitor    Diet: Enteral feedings   GI PPx: Protonix  DVT PPx: sequentials  Activity: bedrest  Dispo: readmitted to hospital from  rehab at [facility], remains ICU      Continue supportive measures, patient remains acutely ill,  Overall prognosis extremely poor. Case discussed with  and sons, more aware of severity of condition.  states does not want a trach. He is aware of his wife's wishes, will speak to pulm in am. Have palliative care revisit.

## 2020-03-08 NOTE — PROGRESS NOTE ADULT - ASSESSMENT
IMPRESSION:    Acute hypoxic respiratory failure likely VTE SP GFF   Klebsiella in BAL treated   DAH resolving  Candidemia SP therapy   HO Autoimmune hepatitis on tacrolimus and solumedrol  Possible recurrent septic shock secondary to pneumonia       PLAN:    CNS:  SAT     HEENT: ET care.  Oral care.      PULMONARY:  HOB @ 45 degrees. Continue Solumedrol 30 mg daily.  Wean O2.  Repeat ABG     CARDIOVASCULAR:  I=O.  Wean Levophed     GI: GI prophylaxis.  Feeding     RENAL:  Follow up lytes. Replete as needed.     INFECTIOUS DISEASE: Follow up cultures.  Continue Meropenem.  FU BAL.     HEMATOLOGICAL:  DVT prophylaxis.  FU CBC and coags     ENDOCRINE:  Follow up FS.  Insulin protocol if needed.    MUSCULOSKELETAL: Bed rest     Prognosis: Very Poor prognosis overall

## 2020-03-08 NOTE — PROGRESS NOTE ADULT - SUBJECTIVE AND OBJECTIVE BOX
Patient is a 75y old  Female who presents with a chief complaint of SOB and desaturation (08 Mar 2020 07:19)        Over Night Events:  Remains critically ill on MV.  Off paralysis.  Sedated.  on Levophed         ROS:     CONSTITUTIONAL:   no fever   no chills.  no weight gain   no weight loss    EYES:   no discharge,   no pain  no redness,   no visual changes.    ENT:   Ears: no ear pain and no hearing problems.  Nose: no nasal congestion and no nasal drainage.  Mouth/Throat: no dysphagia,  no hoarseness and no throat pain.  Neck: no lumps, no pain, no stiffness and no swollen glands.     CARDIOVASCULAR:   Per hPI    RESPIRATORY:  Per HPI    GASTROINTESTINAL:   no abdominal pain,   no constipation,   no diarrhea,   no vomiting.    GENITOURINARY:  no dysuria,   no frequency,   no urgency  no hematuria.    MUSCULOSKELETAL:   no back pain,   no musculoskeletal pain,  no weakness.    SKIN:   no jaundice,   no lesions,   no pruritis,   no rashes.    NEURO:   Sedated     PSYCHIATRIC:   no known mental health issues  no anxiety  no depression    ALLERGIC/IMMUNOLOGIC:   No active allergic or immunologic issues        PHYSICAL EXAM    ICU Vital Signs Last 24 Hrs  T(C): 35.8 (08 Mar 2020 04:00), Max: 36.1 (07 Mar 2020 12:00)  T(F): 96.5 (08 Mar 2020 04:00), Max: 97 (07 Mar 2020 12:00)  HR: 64 (08 Mar 2020 07:30) (54 - 112)  BP: 119/62 (08 Mar 2020 07:30) (59/40 - 171/81)  BP(mean): 91 (08 Mar 2020 07:30) (45 - 120)  ABP: 142/60 (08 Mar 2020 07:30) (72/44 - 226/92)  ABP(mean): 88 (08 Mar 2020 07:30) (52 - 146)  RR: 30 (08 Mar 2020 07:30) (22 - 38)  SpO2: 100% (08 Mar 2020 07:30) (91% - 100%)      CONSTITUTIONAL:   Ill appearing.  Well nourished.  NAD    ENT:   Airway patent,   Mouth with normal mucosa.   No thrush    EYES:   Pupils equal,   Round and reactive to light.    CARDIAC:   Normal rate,   Regular rhythm.    edema      Vascular:  Normal systolic impulse  No Carotid bruits    RESPIRATORY:   No wheezing  Bilateral BS  Normal chest expansion  Not tachypneic,  No use of accessory muscles    GASTROINTESTINAL:  Abdomen soft,   Non-tender,   No guarding,   + BS    GENITOURINARY  normal genitalia for sex   edema    MUSCULOSKELETAL:   Range of motion is not limited,  No muscle or joint tenderness  No clubbing, cyanosis    NEUROLOGICAL:   Sedated     SKIN:   Skin normal color for race,   Warm   No evidence of rash.    PSYCHIATRIC:   Sedated     HEME LYMPH: .  No cervical  lymphadenopathy.  no inguinal lymphadenopathy      03-07-20 @ 06:01  -  03-08-20 @ 07:00  --------------------------------------------------------  IN:    dexmedetomidine Infusion: 243 mL    Enteral Tube Flush: 100 mL    fentaNYL Infusion.: 575 mL    Free Water: 1000 mL    insulin regular Infusion: 66 mL    insulin regular Infusion: 31 mL    IV PiggyBack: 150 mL    norepinephrine Infusion: 29.3 mL    Osmolite: 960 mL  Total IN: 3154.3 mL    OUT:    Indwelling Catheter - Urethral: 2210 mL  Total OUT: 2210 mL    Total NET: 944.3 mL          LABS:                            7.9    3.70  )-----------( 69       ( 08 Mar 2020 05:00 )             25.0                                               03-08    128<L>  |  95<L>  |  54<H>  ----------------------------<  444<H>  4.7   |  26  |  <0.5<L>    Ca    7.3<L>      08 Mar 2020 05:00  Mg     1.8     03-08    TPro  4.8<L>  /  Alb  1.7<L>  /  TBili  0.7  /  DBili  x   /  AST  23  /  ALT  38  /  AlkPhos  363<H>  03-08      PT/INR - ( 07 Mar 2020 04:20 )   PT: 13.40 sec;   INR: 1.17 ratio         PTT - ( 07 Mar 2020 04:20 )  PTT:28.9 sec                                                                                     LIVER FUNCTIONS - ( 08 Mar 2020 05:00 )  Alb: 1.7 g/dL / Pro: 4.8 g/dL / ALK PHOS: 363 U/L / ALT: 38 U/L / AST: 23 U/L / GGT: x                                                  Culture - Fungal, Bronchial (collected 05 Mar 2020 10:34)  Source: .Bronchial None  Preliminary Report (06 Mar 2020 06:42):    Testing in progress    Culture - Acid Fast - Bronchial w/Smear (collected 05 Mar 2020 10:34)  Source: .Bronchial None  Preliminary Report (07 Mar 2020 15:04):    Culture is being performed.    Culture - Bronchial (collected 05 Mar 2020 10:34)  Source: .Bronchial None  Gram Stain (06 Mar 2020 06:31):    Few polymorphonuclear leukocytes seen per low power field    Rare Squamous epithelial cells seen per low power field    Numerous Gram Variable Rods seen per oil power field    Few Gram positive cocci in pairs seen per oil power field  Final Report (07 Mar 2020 15:53):    Normal Respiratory Nikki present                                                   Mode: AC/ CMV (Assist Control/ Continuous Mandatory Ventilation)  RR (machine): 30  TV (machine): 400  FiO2: 60  PEEP: 10  ITime: 1  MAP: 18  PIP: 34                                      ABG - ( 08 Mar 2020 03:22 )  pH, Arterial: 7.39  pH, Blood: x     /  pCO2: 45    /  pO2: 82    / HCO3: 28    / Base Excess: 2.3   /  SaO2: 97                  MEDICATIONS  (STANDING):  ALBUTerol    90 MICROgram(s) HFA Inhaler 4 Puff(s) Inhalation every 6 hours  calcitriol  Solution 0.25 MICROGram(s) Oral daily  calcium carbonate    500 mG (Tums) Chewable 3 Tablet(s) Chew every 8 hours  chlorhexidine 0.12% Liquid 15 milliLiter(s) Oral Mucosa two times a day  chlorhexidine 0.12% Liquid 15 milliLiter(s) Oral Mucosa two times a day  chlorhexidine 4% Liquid 1 Application(s) Topical daily  chlorhexidine 4% Liquid 1 Application(s) Topical <User Schedule>  cyanocobalamin 1000 MICROGram(s) Oral daily  dexMEDEtomidine Infusion 0.5 MICROgram(s)/kG/Hr (9.025 mL/Hr) IV Continuous <Continuous>  dextrose 5%. 1000 milliLiter(s) (50 mL/Hr) IV Continuous <Continuous>  dextrose 50% Injectable 12.5 Gram(s) IV Push once  dextrose 50% Injectable 25 Gram(s) IV Push once  dextrose 50% Injectable 25 Gram(s) IV Push once  fentaNYL   Infusion. 0.5 MICROgram(s)/kG/Hr (3.61 mL/Hr) IV Continuous <Continuous>  gabapentin 300 milliGRAM(s) Oral at bedtime  influenza   Vaccine 0.5 milliLiter(s) IntraMuscular once  insulin lispro (HumaLOG) corrective regimen sliding scale   SubCutaneous three times a day before meals  insulin regular Infusion 1 Unit(s)/Hr (1 mL/Hr) IV Continuous <Continuous>  ipratropium 17 MICROgram(s) HFA Inhaler 4 Puff(s) Inhalation every 6 hours  lactated ringers Bolus 500 milliLiter(s) IV Bolus once  meropenem  IVPB      meropenem  IVPB 1000 milliGRAM(s) IV Intermittent every 8 hours  methylPREDNISolone sodium succinate Injectable 40 milliGRAM(s) IV Push daily  multivitamin/minerals 1 Tablet(s) Oral daily  mupirocin 2% Ointment 1 Application(s) Topical two times a day  norepinephrine Infusion 0.05 MICROgram(s)/kG/Min (3.384 mL/Hr) IV Continuous <Continuous>  pantoprazole   Suspension 40 milliGRAM(s) Oral before breakfast    MEDICATIONS  (PRN):  acetaminophen   Tablet .. 650 milliGRAM(s) Oral every 6 hours PRN Temp greater or equal to 38C (100.4F)  dextrose 40% Gel 15 Gram(s) Oral once PRN Blood Glucose LESS THAN 70 milliGRAM(s)/deciliter  glucagon  Injectable 1 milliGRAM(s) IntraMuscular once PRN Glucose LESS THAN 70 milligrams/deciliter      New X-rays reviewed:                                                                                  ECHO    CXR interpreted by me:  ET OG OK>  Bilateral infiltrates

## 2020-03-08 NOTE — PROGRESS NOTE ADULT - SUBJECTIVE AND OBJECTIVE BOX
PATIENT:  DONI PATRICK  [id number]    CHIEF COMPLAINT:  Patient is a 75y old  Female who presents with a chief complaint of SOB and desaturation (07 Mar 2020 10:56)      INTERVAL HISTORYOVERNIGHT EVENTS:          MEDICATIONS:  MEDICATIONS  (STANDING):  ALBUTerol    90 MICROgram(s) HFA Inhaler 4 Puff(s) Inhalation every 6 hours  calcitriol  Solution 0.25 MICROGram(s) Oral daily  calcium carbonate    500 mG (Tums) Chewable 3 Tablet(s) Chew every 8 hours  chlorhexidine 0.12% Liquid 15 milliLiter(s) Oral Mucosa two times a day  chlorhexidine 0.12% Liquid 15 milliLiter(s) Oral Mucosa two times a day  chlorhexidine 4% Liquid 1 Application(s) Topical daily  chlorhexidine 4% Liquid 1 Application(s) Topical <User Schedule>  cyanocobalamin 1000 MICROGram(s) Oral daily  dexMEDEtomidine Infusion 0.5 MICROgram(s)/kG/Hr (9.025 mL/Hr) IV Continuous <Continuous>  dextrose 5%. 1000 milliLiter(s) (50 mL/Hr) IV Continuous <Continuous>  dextrose 50% Injectable 12.5 Gram(s) IV Push once  dextrose 50% Injectable 25 Gram(s) IV Push once  dextrose 50% Injectable 25 Gram(s) IV Push once  fentaNYL   Infusion. 0.5 MICROgram(s)/kG/Hr (3.61 mL/Hr) IV Continuous <Continuous>  gabapentin 300 milliGRAM(s) Oral at bedtime  influenza   Vaccine 0.5 milliLiter(s) IntraMuscular once  insulin lispro (HumaLOG) corrective regimen sliding scale   SubCutaneous three times a day before meals  insulin regular Infusion 1 Unit(s)/Hr (1 mL/Hr) IV Continuous <Continuous>  ipratropium 17 MICROgram(s) HFA Inhaler 4 Puff(s) Inhalation every 6 hours  lactated ringers Bolus 500 milliLiter(s) IV Bolus once  meropenem  IVPB      meropenem  IVPB 1000 milliGRAM(s) IV Intermittent every 8 hours  methylPREDNISolone sodium succinate Injectable 40 milliGRAM(s) IV Push daily  multivitamin/minerals 1 Tablet(s) Oral daily  mupirocin 2% Ointment 1 Application(s) Topical two times a day  norepinephrine Infusion 0.05 MICROgram(s)/kG/Min (3.384 mL/Hr) IV Continuous <Continuous>  pantoprazole   Suspension 40 milliGRAM(s) Oral before breakfast    MEDICATIONS  (PRN):  acetaminophen   Tablet .. 650 milliGRAM(s) Oral every 6 hours PRN Temp greater or equal to 38C (100.4F)  dextrose 40% Gel 15 Gram(s) Oral once PRN Blood Glucose LESS THAN 70 milliGRAM(s)/deciliter  glucagon  Injectable 1 milliGRAM(s) IntraMuscular once PRN Glucose LESS THAN 70 milligrams/deciliter      ALLERGIES:  Allergies    No Known Allergies    Intolerances        OBJECTIVE:  ICU Vital Signs Last 24 Hrs  T(C): 35.8 (08 Mar 2020 04:00), Max: 36.4 (07 Mar 2020 08:00)  T(F): 96.5 (08 Mar 2020 04:00), Max: 97.5 (07 Mar 2020 08:00)  HR: 74 (08 Mar 2020 06:45) (54 - 112)  BP: 87/55 (08 Mar 2020 06:45) (59/40 - 171/81)  BP(mean): 67 (08 Mar 2020 06:45) (45 - 120)  ABP: 78/42 (08 Mar 2020 06:45) (72/44 - 226/92)  ABP(mean): 54 (08 Mar 2020 06:45) (52 - 146)  RR: 34 (08 Mar 2020 06:45) (22 - 38)  SpO2: 97% (08 Mar 2020 06:45) (91% - 100%)    Mode: AC/ CMV (Assist Control/ Continuous Mandatory Ventilation)  RR (machine): 30  TV (machine): 400  FiO2: 60  PEEP: 10  ITime: 1  MAP: 18  PIP: 34    Adult Advanced Hemodynamics Last 24 Hrs  CVP(mm Hg): --  CVP(cm H2O): --  CO: --  CI: --  PA: --  PA(mean): --  PCWP: --  SVR: --  SVRI: --  PVR: --  PVRI: --  CAPILLARY BLOOD GLUCOSE      POCT Blood Glucose.: 281 mg/dL (08 Mar 2020 07:05)  POCT Blood Glucose.: 316 mg/dL (08 Mar 2020 06:30)  POCT Blood Glucose.: 371 mg/dL (08 Mar 2020 05:41)  POCT Blood Glucose.: 407 mg/dL (08 Mar 2020 04:21)  POCT Blood Glucose.: 316 mg/dL (08 Mar 2020 00:17)  POCT Blood Glucose.: 217 mg/dL (07 Mar 2020 18:10)  POCT Blood Glucose.: 197 mg/dL (07 Mar 2020 16:28)  POCT Blood Glucose.: 132 mg/dL (07 Mar 2020 14:51)  POCT Blood Glucose.: 79 mg/dL (07 Mar 2020 13:58)  POCT Blood Glucose.: 110 mg/dL (07 Mar 2020 12:58)  POCT Blood Glucose.: 99 mg/dL (07 Mar 2020 12:48)  POCT Blood Glucose.: 27 mg/dL (07 Mar 2020 12:41)  POCT Blood Glucose.: 152 mg/dL (07 Mar 2020 08:28)    CAPILLARY BLOOD GLUCOSE      POCT Blood Glucose.: 281 mg/dL (08 Mar 2020 07:05)    I&O's Summary    07 Mar 2020 06:01  -  08 Mar 2020 07:00  --------------------------------------------------------  IN: 1783 mL / OUT: 2005 mL / NET: -222 mL      Daily     Daily     PHYSICAL EXAMINATION:  General: WN/WD NAD  HEENT: PERRLA, EOMI, moist mucous membranes  Neurology: A&Ox3, nonfocal, ARIAS x 4  Respiratory: CTA B/L, normal respiratory effort, no wheezes, crackles, rales  CV: RRR, S1S2, no murmurs, rubs or gallops  Abdominal: Soft, NT, ND +BS, Last BM  Extremities: No edema, + peripheral pulses  Incisions:   Tubes:    LABS:  ABG - ( 08 Mar 2020 03:22 )  pH, Arterial: 7.39  pH, Blood: x     /  pCO2: 45    /  pO2: 82    / HCO3: 28    / Base Excess: 2.3   /  SaO2: 97                                      7.9    3.70  )-----------( 69       ( 08 Mar 2020 05:00 )             25.0     03-08    128<L>  |  95<L>  |  54<H>  ----------------------------<  444<H>  4.7   |  26  |  <0.5<L>    Ca    7.3<L>      08 Mar 2020 05:00  Mg     1.8     03-08    TPro  4.8<L>  /  Alb  1.7<L>  /  TBili  0.7  /  DBili  x   /  AST  23  /  ALT  38  /  AlkPhos  363<H>  03-08    LIVER FUNCTIONS - ( 08 Mar 2020 05:00 )  Alb: 1.7 g/dL / Pro: 4.8 g/dL / ALK PHOS: 363 U/L / ALT: 38 U/L / AST: 23 U/L / GGT: x           PT/INR - ( 07 Mar 2020 04:20 )   PT: 13.40 sec;   INR: 1.17 ratio         PTT - ( 07 Mar 2020 04:20 )  PTT:28.9 sec            TELEMETRY:     EKG:     IMAGING: PATIENT:  DONI PATRICK  [id number]    CHIEF COMPLAINT:  Patient is a 75y old  Female who presents with a chief complaint of SOB and desaturation (07 Mar 2020 10:56)      INTERVAL HISTORYOVERNIGHT EVENTS:  No events overnight.        MEDICATIONS:  MEDICATIONS  (STANDING):  ALBUTerol    90 MICROgram(s) HFA Inhaler 4 Puff(s) Inhalation every 6 hours  calcitriol  Solution 0.25 MICROGram(s) Oral daily  calcium carbonate    500 mG (Tums) Chewable 3 Tablet(s) Chew every 8 hours  chlorhexidine 0.12% Liquid 15 milliLiter(s) Oral Mucosa two times a day  chlorhexidine 0.12% Liquid 15 milliLiter(s) Oral Mucosa two times a day  chlorhexidine 4% Liquid 1 Application(s) Topical daily  chlorhexidine 4% Liquid 1 Application(s) Topical <User Schedule>  cyanocobalamin 1000 MICROGram(s) Oral daily  dexMEDEtomidine Infusion 0.5 MICROgram(s)/kG/Hr (9.025 mL/Hr) IV Continuous <Continuous>  dextrose 5%. 1000 milliLiter(s) (50 mL/Hr) IV Continuous <Continuous>  dextrose 50% Injectable 12.5 Gram(s) IV Push once  dextrose 50% Injectable 25 Gram(s) IV Push once  dextrose 50% Injectable 25 Gram(s) IV Push once  fentaNYL   Infusion. 0.5 MICROgram(s)/kG/Hr (3.61 mL/Hr) IV Continuous <Continuous>  gabapentin 300 milliGRAM(s) Oral at bedtime  influenza   Vaccine 0.5 milliLiter(s) IntraMuscular once  insulin lispro (HumaLOG) corrective regimen sliding scale   SubCutaneous three times a day before meals  insulin regular Infusion 1 Unit(s)/Hr (1 mL/Hr) IV Continuous <Continuous>  ipratropium 17 MICROgram(s) HFA Inhaler 4 Puff(s) Inhalation every 6 hours  lactated ringers Bolus 500 milliLiter(s) IV Bolus once  meropenem  IVPB      meropenem  IVPB 1000 milliGRAM(s) IV Intermittent every 8 hours  methylPREDNISolone sodium succinate Injectable 40 milliGRAM(s) IV Push daily  multivitamin/minerals 1 Tablet(s) Oral daily  mupirocin 2% Ointment 1 Application(s) Topical two times a day  norepinephrine Infusion 0.05 MICROgram(s)/kG/Min (3.384 mL/Hr) IV Continuous <Continuous>  pantoprazole   Suspension 40 milliGRAM(s) Oral before breakfast    MEDICATIONS  (PRN):  acetaminophen   Tablet .. 650 milliGRAM(s) Oral every 6 hours PRN Temp greater or equal to 38C (100.4F)  dextrose 40% Gel 15 Gram(s) Oral once PRN Blood Glucose LESS THAN 70 milliGRAM(s)/deciliter  glucagon  Injectable 1 milliGRAM(s) IntraMuscular once PRN Glucose LESS THAN 70 milligrams/deciliter      ALLERGIES:  Allergies    No Known Allergies    Intolerances        OBJECTIVE:  ICU Vital Signs Last 24 Hrs  T(C): 35.8 (08 Mar 2020 04:00), Max: 36.4 (07 Mar 2020 08:00)  T(F): 96.5 (08 Mar 2020 04:00), Max: 97.5 (07 Mar 2020 08:00)  HR: 74 (08 Mar 2020 06:45) (54 - 112)  BP: 87/55 (08 Mar 2020 06:45) (59/40 - 171/81)  BP(mean): 67 (08 Mar 2020 06:45) (45 - 120)  ABP: 78/42 (08 Mar 2020 06:45) (72/44 - 226/92)  ABP(mean): 54 (08 Mar 2020 06:45) (52 - 146)  RR: 34 (08 Mar 2020 06:45) (22 - 38)  SpO2: 97% (08 Mar 2020 06:45) (91% - 100%)    Mode: AC/ CMV (Assist Control/ Continuous Mandatory Ventilation)  RR (machine): 30  TV (machine): 400  FiO2: 60  PEEP: 10  ITime: 1  MAP: 18  PIP: 34    Adult Advanced Hemodynamics Last 24 Hrs  CVP(mm Hg): --  CVP(cm H2O): --  CO: --  CI: --  PA: --  PA(mean): --  PCWP: --  SVR: --  SVRI: --  PVR: --  PVRI: --  CAPILLARY BLOOD GLUCOSE      POCT Blood Glucose.: 281 mg/dL (08 Mar 2020 07:05)  POCT Blood Glucose.: 316 mg/dL (08 Mar 2020 06:30)  POCT Blood Glucose.: 371 mg/dL (08 Mar 2020 05:41)  POCT Blood Glucose.: 407 mg/dL (08 Mar 2020 04:21)  POCT Blood Glucose.: 316 mg/dL (08 Mar 2020 00:17)  POCT Blood Glucose.: 217 mg/dL (07 Mar 2020 18:10)  POCT Blood Glucose.: 197 mg/dL (07 Mar 2020 16:28)  POCT Blood Glucose.: 132 mg/dL (07 Mar 2020 14:51)  POCT Blood Glucose.: 79 mg/dL (07 Mar 2020 13:58)  POCT Blood Glucose.: 110 mg/dL (07 Mar 2020 12:58)  POCT Blood Glucose.: 99 mg/dL (07 Mar 2020 12:48)  POCT Blood Glucose.: 27 mg/dL (07 Mar 2020 12:41)  POCT Blood Glucose.: 152 mg/dL (07 Mar 2020 08:28)    CAPILLARY BLOOD GLUCOSE      POCT Blood Glucose.: 281 mg/dL (08 Mar 2020 07:05)    I&O's Summary    07 Mar 2020 06:01  -  08 Mar 2020 07:00  --------------------------------------------------------  IN: 1783 mL / OUT: 2005 mL / NET: -222 mL      Daily     Daily     PHYSICAL EXAMINATION:  General: intubated, sedated  HEENT: PERRLA, EOMI, moist mucous membranes  Neurology: does not follow commands  Respiratory: b/l breath sounds  CV: RRR, S1S2, no murmurs, rubs or gallops  Abdominal: Soft, ND  Extremities: edema, + peripheral pulses  Incisions:   Tubes: intubated    LABS:  ABG - ( 08 Mar 2020 03:22 )  pH, Arterial: 7.39  pH, Blood: x     /  pCO2: 45    /  pO2: 82    / HCO3: 28    / Base Excess: 2.3   /  SaO2: 97                                      7.9    3.70  )-----------( 69       ( 08 Mar 2020 05:00 )             25.0     03-08    128<L>  |  95<L>  |  54<H>  ----------------------------<  444<H>  4.7   |  26  |  <0.5<L>    Ca    7.3<L>      08 Mar 2020 05:00  Mg     1.8     03-08    TPro  4.8<L>  /  Alb  1.7<L>  /  TBili  0.7  /  DBili  x   /  AST  23  /  ALT  38  /  AlkPhos  363<H>  03-08    LIVER FUNCTIONS - ( 08 Mar 2020 05:00 )  Alb: 1.7 g/dL / Pro: 4.8 g/dL / ALK PHOS: 363 U/L / ALT: 38 U/L / AST: 23 U/L / GGT: x           PT/INR - ( 07 Mar 2020 04:20 )   PT: 13.40 sec;   INR: 1.17 ratio         PTT - ( 07 Mar 2020 04:20 )  PTT:28.9 sec            TELEMETRY:     EKG:     IMAGING:

## 2020-03-08 NOTE — PROGRESS NOTE ADULT - ASSESSMENT
74y female with PMH of asthma, COPD not on home O2, autoimmune hepatitis on prednisone and tacrolimus, heterozygous prothrombin gene mutation with hx of PE and DVT on coumadin presents to the hospital complaining of cough, hemoptysis. Found to have viral pneumonia with respiratory failure and intubated. Extubated after prolonged course but required re-intubation 2/26 for worsening respiratory status and received IVC filter 2/26 because pt not safe for AC (alveolar hemorrhage).  As per palliative, Pt's family will continue treatment course and may possibly pursue trach for now.      #) Diffuse Alveoloar hemorrhage  - completed Zyvox, Levaquin, Cefepime course, and oseltamivir   - C/w Duoneb   - Solumedrol IV 40mg q24hr  - Bronch results - neg culture, neg fungal, cytology positive for inflammatory cells, no organisms  - Bronch 2/16 showed bleeding from DAH  - Bronch 2/26 for BAL, cultures show gram - rods, start meropenem 2/27    #) Opacities Lung  - wbc increased from 4.03 (3/4) to 9.81 (3/5)  - bronchoscopy 3/5 for new right upper opacity on cxr, bronchial aspirate sent, f/u cultures  - start vancomycin, continue meropenem, f/u vanc levels before 30 mins before 4th dose (3/6 at 5:30 PM)  - 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  - started on nimbex drip 3/6 for persistent sedation and agitation on ventilator    #) Tachycardia  - start cardizem 60 mg q8  - IV 10 metoprolol pushes if persistent tachy    #) Candidemia, secondary to central line  - cultures neg since 2/14  - oral fluconazole 14 days, switched to caspofungin for rising LFT (2/26), completed 2/27  - ID following  - repeat fungitell 3/4, continue caspofungin    #) Rectal Bleeding   - GI following   - received 1U of blood 2/23, hemoglobin stable  - Protonix 40mg PO qd   - FlexSig showed 2 rectal ulcers one clean one crater, s/p clipping  - CBC q24  - Avoid Dignshield or rectal tube   - s/p sigmoidoscopy recurrent rectal bleeding possibly from diverticulosis 2/28, received 2U pRBC,   - will need CT angio if bleeding again, f/u CBC    #) 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  - duplex shows DVT consistent with prior  - not candidate for AC at this time due to alveolar hemorrhage   - IVC filter placed 2/26    #Immunosuppressed: Autoimmune Hepatitis   - solumedrol 60 mg qd  - CMV PCR neg this admission     #Heterozygous prothrombin gene mutation with hx of PE and DVT on coumadin  - holding coumadin for now due to alveolar hemorrhage   - monitor coags    #0.5 cm of GB polyp  - repeat US abdomen in 6 months per GI    #Deconditioning   - pt severely deconditioned due to prolonged hospital stay  - PT/Rehab     #) MISC  Activity: intubated  DVT ppx: IVC filter  GI ppx: protonix  Diet: tube feeding  Code: Full  Dispo: medical optimization  [facility] 74y female with PMH of asthma, COPD not on home O2, autoimmune hepatitis on prednisone and tacrolimus, heterozygous prothrombin gene mutation with hx of PE and DVT on coumadin presents to the hospital complaining of cough, hemoptysis. Found to have viral pneumonia with respiratory failure and intubated. Extubated after prolonged course but required re-intubation 2/26 for worsening respiratory status and received IVC filter 2/26 because pt not safe for AC (alveolar hemorrhage).  As per palliative, Pt's family will continue treatment course and may possibly pursue trach for now.      #) Diffuse Alveoloar hemorrhage  - completed Zyvox, Levaquin, Cefepime course, and oseltamivir   - C/w Duoneb   - Solumedrol IV 40mg q24hr  - Bronch results - neg culture, neg fungal, cytology positive for inflammatory cells, no organisms  - Bronch 2/16 showed bleeding from DAH  - Bronch 2/26 for BAL, cultures show gram - rods, start meropenem 2/27    #) Opacities Lung  - wbc increased from 4.03 (3/4) to 9.81 (3/5)  - bronchoscopy 3/5 for new right upper opacity on cxr, bronchial aspirate sent, f/u cultures  - start vancomycin, continue meropenem, f/u vanc levels before 30 mins before 4th dose (3/6 at 5:30 PM)  - 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  - started on nimbex drip 3/6 for persistent sedation and agitation on ventilator  - pending bronchial cultures from bronchoscopy 3/5    #) Tachycardia  - start cardizem 60 mg q8  - IV 10 metoprolol pushes if persistent tachy    #) Candidemia, secondary to central line  - cultures neg since 2/14  - oral fluconazole 14 days, switched to caspofungin for rising LFT (2/26), completed 2/27  - ID following  - repeat fungitell 3/4, continue caspofungin    #) Rectal Bleeding   - GI following   - received 1U of blood 2/23, hemoglobin stable  - Protonix 40mg PO qd   - FlexSig showed 2 rectal ulcers one clean one crater, s/p clipping  - CBC q24  - Avoid Dignshield or rectal tube   - s/p sigmoidoscopy recurrent rectal bleeding possibly from diverticulosis 2/28, received 2U pRBC,   - will need CT angio if bleeding again, f/u CBC    #) 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  - duplex shows DVT consistent with prior  - not candidate for AC at this time due to alveolar hemorrhage   - IVC filter placed 2/26    #Immunosuppressed: Autoimmune Hepatitis   - solumedrol 60 mg qd  - CMV PCR neg this admission     #Heterozygous prothrombin gene mutation with hx of PE and DVT on coumadin  - holding coumadin for now due to alveolar hemorrhage   - monitor coags    #0.5 cm of GB polyp  - repeat US abdomen in 6 months per GI    #Deconditioning   - pt severely deconditioned due to prolonged hospital stay  - PT/Rehab     #) MISC  Activity: intubated  DVT ppx: IVC filter  GI ppx: protonix  Diet: tube feeding  Code: Full  Dispo: medical optimization  CHG

## 2020-03-09 NOTE — PROGRESS NOTE ADULT - SUBJECTIVE AND OBJECTIVE BOX
DARNELL, DONI  75y, Female  Allergy: No Known Allergies      LOS  38d    CHIEF COMPLAINT: SOB and desaturation (09 Mar 2020 11:31)      INTERVAL EVENTS/HPI  - No acute events overnight  - T(F): , Max: 100 (03-08-20 @ 21:00)  - Tolerating medication  - WBC Count: 6.99 (03-09-20 @ 04:30)  WBC Count: 3.70 (03-08-20 @ 05:00)  - Creatinine, Serum: <0.5 (03-09-20 @ 04:30)  Creatinine, Serum: <0.5 (03-08-20 @ 05:00)       ROS  unable to obtain history secondary to patient's mental status and/or sedation     VITALS:  T(F): 98.7, Max: 100 (03-08-20 @ 21:00)  HR: 100  BP: --  RR: 27Vital Signs Last 24 Hrs  T(C): 37.1 (09 Mar 2020 08:00), Max: 37.8 (08 Mar 2020 21:00)  T(F): 98.7 (09 Mar 2020 08:00), Max: 100 (08 Mar 2020 21:00)  HR: 100 (09 Mar 2020 09:15) (70 - 127)  BP: --  BP(mean): --  RR: 27 (09 Mar 2020 09:15) (20 - 56)  SpO2: 97% (09 Mar 2020 09:15) (82% - 100%)    PHYSICAL EXAM:  Gen: chronically ill appearing intubated  HEENT: Normocephalic, atraumatic  Neck: supple, no lymphadenopathy  CV: Regular rate & regular rhythm  Lungs: decreased BS at bases, no fremitus  Abdomen: Soft, BS present  Ext: Warm, well perfused, anasarca  Neuro: sedated  Skin: no rash, no erythema, multiple ecchymoses  Lines: no phlebitis, IJ      FH: Non-contributory  Social Hx: Non-contributory    TESTS & MEASUREMENTS:                        8.0    6.99  )-----------( 98       ( 09 Mar 2020 04:30 )             25.5     03-09    134<L>  |  100  |  52<H>  ----------------------------<  46<L>  5.0   |  26  |  <0.5<L>    Ca    7.3<L>      09 Mar 2020 04:30  Mg     2.0     03-09    TPro  4.8<L>  /  Alb  1.7<L>  /  TBili  0.6  /  DBili  x   /  AST  23  /  ALT  33  /  AlkPhos  451<H>  03-09    eGFR if Non African American: 102 mL/min/1.73M2 (03-09-20 @ 04:30)  eGFR if : 118 mL/min/1.73M2 (03-09-20 @ 04:30)    LIVER FUNCTIONS - ( 09 Mar 2020 04:30 )  Alb: 1.7 g/dL / Pro: 4.8 g/dL / ALK PHOS: 451 U/L / ALT: 33 U/L / AST: 23 U/L / GGT: x               Culture - Fungal, Bronchial (collected 03-05-20 @ 10:34)  Source: .Bronchial None  Preliminary Report (03-09-20 @ 09:08):    Rare Yeast    Culture - Acid Fast - Bronchial w/Smear (collected 03-05-20 @ 10:34)  Source: .Bronchial None  Preliminary Report (03-07-20 @ 15:04):    Culture is being performed.    Culture - Bronchial (collected 03-05-20 @ 10:34)  Source: .Bronchial None  Gram Stain (03-06-20 @ 06:31):    Few polymorphonuclear leukocytes seen per low power field    Rare Squamous epithelial cells seen per low power field    Numerous Gram Variable Rods seen per oil power field    Few Gram positive cocci in pairs seen per oil power field  Final Report (03-07-20 @ 15:53):    Normal Respiratory Nikki present    Culture - Acid Fast - Bronchial w/Smear (collected 02-26-20 @ 14:45)  Source: .Bronchial None  Preliminary Report (03-07-20 @ 15:03):    No growth at 1 week.    Culture - Fungal, Bronchial (collected 02-26-20 @ 14:45)  Source: .Bronchial None  Preliminary Report (03-02-20 @ 08:54):    Rare Yeast    Culture - Bronchial (collected 02-26-20 @ 14:45)  Source: .Bronchial None  Gram Stain (02-27-20 @ 07:36):    Moderate polymorphonuclear leukocytes seen per low power field    Rare Squamous epithelial cells seen per low power field    Numerous Gram Negative Rods seen per oil power field  Final Report (02-28-20 @ 21:13):    Numerous Klebsiella pneumoniae    Normal Respiratory Nikki present  Organism: Klebsiella pneumoniae (02-28-20 @ 21:13)  Organism: Klebsiella pneumoniae (02-28-20 @ 21:13)      -  Amikacin: S <=16      -  Amoxicillin/Clavulanic Acid: S <=8/4      -  Ampicillin: R >16 These ampicillin results predict results for amoxicillin      -  Ampicillin/Sulbactam: S 8/4 Enterobacter, Citrobacter, and Serratia may develop resistance during prolonged therapy (3-4 days)      -  Aztreonam: S <=4      -  Cefazolin: S <=2 Enterobacter, Citrobacter, and Serratia may develop resistance during prolonged therapy (3-4 days)      -  Cefepime: S <=2      -  Cefoxitin: S <=8      -  Ceftriaxone: S <=1 Enterobacter, Citrobacter, and Serratia may develop resistance during prolonged therapy      -  Ciprofloxacin: S <=1      -  Ertapenem: S <=0.5      -  Gentamicin: S <=2      -  Imipenem: S <=1      -  Levofloxacin: S <=2      -  Meropenem: S <=1      -  Piperacillin/Tazobactam: S <=8      -  Tobramycin: S <=2      -  Trimethoprim/Sulfamethoxazole: S <=2/38      Method Type: EROS    Culture - Blood (collected 02-17-20 @ 05:17)  Source: .Blood None  Final Report (02-22-20 @ 14:00):    No growth at 5 days.    Culture - Blood (collected 02-15-20 @ 04:57)  Source: .Blood None  Final Report (02-20-20 @ 18:01):    No growth at 5 days.    Culture - Sputum (collected 02-14-20 @ 17:00)  Source: .Sputum Sputum  Gram Stain (02-15-20 @ 04:56):    Few Squamous epithelial cells per low power field    Few polymorphonuclear leukocytes per low power field    Few Yeast like cells per oil power field    Few Gram variable coccobacilli per oil power field  Final Report (02-16-20 @ 17:44):    Moderate Methicillin resistant Staphylococcus aureus    Normal Respiratory Nikki present  Organism: Methicillin resistant Staphylococcus aureus (02-16-20 @ 17:44)  Organism: Methicillin resistant Staphylococcus aureus (02-16-20 @ 17:44)      -  Ampicillin/Sulbactam: R <=8/4      -  Cefazolin: R <=4      -  Clindamycin: S <=0.25      -  Erythromycin: R >4      -  Gentamicin: S <=1 Should not be used as monotherapy      -  Linezolid: S 2      -  Oxacillin: R >2      -  Penicillin: R >8      -  RIF- Rifampin: S <=1 Should not be used as monotherapy      -  Tetra/Doxy: S <=1      -  Trimethoprim/Sulfamethoxazole: S <=0.5/9.5      -  Vancomycin: S 2      Method Type: EROS    Culture - Blood (collected 02-14-20 @ 04:30)  Source: .Blood Blood-Peripheral  Final Report (02-19-20 @ 14:00):    No growth at 5 days.    Culture - Other (collected 02-13-20 @ 18:55)  Source: .Other wound  Final Report (02-15-20 @ 19:22):    No growth    Culture - Blood (collected 02-12-20 @ 04:50)  Source: .Blood None  Gram Stain (02-13-20 @ 14:24):    Growth in aerobic bottle: Yeast like cells  Final Report (02-18-20 @ 15:03):    Growth in aerobic bottle: Candida albicans    See previous culture [id number]    Culture - Blood (collected 02-11-20 @ 11:21)  Source: .Blood None  Gram Stain (02-12-20 @ 16:48):    Growth in aerobic bottle: Yeast like cells  Final Report (02-15-20 @ 14:05):    Growth in aerobic bottle: Candida albicans    ***Blood Panel PCR results on this specimen are available    approximately 3 hours after the Gram stain result.***    Gram stain, PCR, and/or culture results may not always    correspond due to difference in methodologies.    ************************************************************    This PCR assay was performed using [employer].    The following targets are tested for: Enterococcus,    vancomycin resistant enterococci, Listeria monocytogenes,    coagulase negative staphylococci, S. aureus,    methicillin resistant S. aureus, Streptococcus agalactiae    (Group B), S. pneumoniae, S. pyogenes (Group A),    Acinetobacter baumannii, Enterobacter cloacae, E. coli,    Klebsiella oxytoca, K. pneumoniae, Proteus sp.,    Serratia marcescens, Haemophilus influenzae,    Neisseria meningitidis, Pseudomonas aeruginosa, Candida    albicans, C. glabrata, C krusei, C parapsilosis,    C. tropicalis and the KPC resistance gene.  Organism: Blood Culture PCR  Candida albicans (02-15-20 @ 14:05)  Organism: Candida albicans (02-15-20 @ 14:05)      -  Fluconazole: S 0.25 Fluconazole = Data established for mucosal disease, and is generally applicable for invasive disease.  Susceptibility is dependent on achieving the maximal possible blood level.  Doses of 400 MG/day or more may be required in adults with normal renalfunction and body habitus.      -  Interpretation: See note This test method is not approved by the FDA and is for research use only.  The performance characteristics of this assay may have been determined by [employer] and [facility], [facility].                            N/I - No interpretation available                                                         SDD – Sensitive Dose Dependent      Method Type: YSTMIC  Organism: Blood Culture PCR (02-15-20 @ 14:05)      -  Candida albicans: Detec      Method Type: PCR            INFECTIOUS DISEASES TESTING  Fungitell: >500 (02-11-20 @ 11:21)  Fungitell: <31 (02-04-20 @ 04:40)  Streptococcus Pneumoniae Ag Urine: Negative (02-02-20 @ 11:00)  MRSA PCR Result.: Positive (02-01-20 @ 20:40)  Procalcitonin, Serum: 1.86 (02-01-20 @ 20:30)  Procalcitonin, Serum: 1.35 (02-01-20 @ 11:17)  Fungitell: 49 (02-01-20 @ 11:17)  Procalcitonin, Serum: 0.77 (02-01-20 @ 04:46)  Rapid RVP Result: Detected (01-31-20 @ 16:24)  Legionella Antigen, Urine: Negative (01-31-20 @ 15:36)  Streptococcus Pneumoniae Ag Urine: Negative (01-31-20 @ 15:36)  Legionella Antigen, Urine: Negative (01-20-20 @ 02:50)  Procalcitonin, Serum: 0.05 (01-16-20 @ 11:30)  MRSA PCR Result.: Positive (01-14-20 @ 13:59)  Flu A Result: Negative (01-13-20 @ 13:10)  Flu B Result: Negative (01-13-20 @ 13:10)  RSV Result: Negative (01-13-20 @ 13:10)  MRSA PCR Result.: Negative (08-13-19 @ 08:46)      RADIOLOGY & ADDITIONAL TESTS:  I have personally reviewed the last available Chest xray  CXR      CT      CARDIOLOGY TESTING  Transthoracic Echocardiogram:    EXAM:  2-D ECHO (TTE) COMPLETE        PROCEDURE DATE:  02/26/2020      INTERPRETATION:  REPORT:    TRANSTHORACIC ECHOCARDIOGRAM REPORT         Patient Name:   DONI PATRICK Accession #: [id number]  Medical Rec #:  [id number]     Height:      64.0 in 162.6 cm  YOB: 1945    Weight:      181.0 lb 82.10 kg  Patient Age:    75 years     BSA:         1.87 m²  Patient Gender: F            BP:          106/63 mmHg       Date of Exam:        2/26/2020 4:09:11 PM  Referring Physician: [id number] [facility]  Sonographer:         Peter Peterson  Reading Physician:   Alexander Alex M.D.    Procedure:   2D Echo/Doppler/Color Doppler Complete.  Indications: I26.99 - Other Pulmonary Embolism without Acute Cor Pulmonale  Diagnosis:   Other pulmonary embolism without acute cor pulmonale - I26.99         Summary:   1. LV Ejection Fraction by Caballero's Method with a biplane EF of 70 %.   2. Moderate concentric left ventricular hypertrophy.   3. Spectral Doppler shows impaired relaxation pattern of left ventricular myocardial filling (Grade I diastolic dysfunction).   4. Mild mitral valve regurgitation.   5. Sclerotic aortic valve with normal opening.   6. Estimated pulmonary artery systolic pressure is 36.2 mmHg assuming a right atrial pressure of 5 mmHg, which is consistent with borderline pulmonary hypertension.    PHYSICIAN INTERPRETATION:  Left Ventricle: The left ventricular internal cavity size is normal. There is moderate concentric left ventricular hypertrophy. Spectral Doppler shows impaired relaxation pattern of left ventricular myocardial filling (Grade I diastolic dysfunction).  Right Ventricle: Normal right ventricular size and function.  Left Atrium: Normal left atrial size.  Right Atrium: Normal right atrial size.  Pericardium: There is no evidence of pericardial effusion.  Mitral Valve: Structurally normal mitral valve, with normal leaflet excursion. The mitral valve is normal in structure. Mild mitral valve regurgitation is seen.  Tricuspid Valve: Structurally normal tricuspid valve, with normal leaflet excursion. The tricuspid valve is normal in structure. Mild tricuspid regurgitation is visualized. Estimated pulmonary artery systolic pressure is 36.2 mmHg assuming a right atrial pressure of 5 mmHg, which is consistent with borderline pulmonary hypertension.  Aortic Valve: The aortic valve is trileaflet. No evidence of aortic stenosis. Sclerotic aortic valve with normal opening. Trivial aortic valve regurgitation is seen.  Pulmonic Valve: Structurally normal pulmonic valve, with normal leaflet excursion. The pulmonic valve is normal. Trace pulmonic valve regurgitation.  Aorta: The aortic root and ascending aorta are structurally normal, with no evidence of dilitation.  Pulmonary Artery: The main pulmonary artery is normal in size.       2D AND M-MODE MEASUREMENTS (normal ranges within parentheses):  Left Ventricle:                  Normal   Aorta/Left Atrium:             Normal  IVSd (2D):              1.51 cm (0.7-1.1) AoV Cusp Separation: 1.74 cm (1.5-2.6)  LVPWd (2D):             1.51 cm (0.7-1.1) Left Atrium (Mmode): 2.63 cm (1.9-4.0)  LVIDd (2D):             3.22 cm (3.4-5.7) Right Ventricle:  LVIDs (2D):             1.90 cm           RVd (2D):        2.29 cm  LV FS (2D):             41.1%   (>25%)  Relative Wall Thickness  0.94    (<0.42)    SPECTRAL DOPPLER ANALYSIS:  LV DIASTOLIC FUNCTION:  MV Peak E: 0.60 m/s Decel Time: 229 msec  MV Peak A: 0.88 m/s  E/A Ratio: 0.68    Aortic Valve:  AoV VMax:    1.32 m/s AoV Area, Vmax: 2.41 cm² Vmax Indx: 1.29 cm²/m²  AoV Pk Grad: 7.0 mmHg    LVOT Vmax: 1.02 m/s  LVOT VTI:  0.20 m  LVOT Diam: 2.00 cm    Mitral Valve:  MV P1/2 Time: 66.49 msec  MV Area, PHT: 3.31 cm²    Tricuspid Valve and PA/RV Systolic Pressure: TR Max Velocity: 2.79 m/s RA Pressure: 5 mmHg RVSP/PASP: 36.2 mmHg       [id number] Alexander Royzman M.D., Electronically signed on 2/26/2020 at 4:20:25 PM              *** Final ***                    ALEXANDER ALEX MD  This document has been electronically signed. Feb 26 2020  4:09PM             (02-26-20 @ 16:09)      MEDICATIONS  ALBUTerol    90 MICROgram(s) HFA Inhaler 4  calcitriol  Solution 0.25  calcium carbonate    500 mG (Tums) Chewable 3  chlorhexidine 0.12% Liquid 15  chlorhexidine 0.12% Liquid 15  chlorhexidine 4% Liquid 1  chlorhexidine 4% Liquid 1  cyanocobalamin 1000  dexMEDEtomidine Infusion 0.2  dextrose 5%. 1000  dextrose 50% Injectable 12.5  dextrose 50% Injectable 25  dextrose 50% Injectable 25  fentaNYL   Infusion. 0.5  gabapentin 300  influenza   Vaccine 0.5  insulin lispro (HumaLOG) corrective regimen sliding scale   insulin regular Infusion 1  ipratropium 17 MICROgram(s) HFA Inhaler 4  lactated ringers Bolus 500  meropenem  IVPB   meropenem  IVPB 1000  methylPREDNISolone sodium succinate Injectable 30  multivitamin/minerals 1  mupirocin 2% Ointment 1  norepinephrine Infusion 0.05  pantoprazole   Suspension 40      WEIGHT  Weight (kg): 72.2 (02-26-20 @ 11:30)  Creatinine, Serum: <0.5 mg/dL (03-09-20 @ 04:30)      ANTIBIOTICS:  meropenem  IVPB      meropenem  IVPB 1000 milliGRAM(s) IV Intermittent every 8 hours      All available historical records have been reviewed

## 2020-03-09 NOTE — PROGRESS NOTE ADULT - SUBJECTIVE AND OBJECTIVE BOX
PATIENT:  DONI PATRICK  [id number]    CHIEF COMPLAINT:  Patient is a 75y old  Female who presents with a chief complaint of SOB and desaturation (09 Mar 2020 12:30)      INTERVAL HISTORYOVERNIGHT EVENTS:          MEDICATIONS:  MEDICATIONS  (STANDING):  ALBUTerol    90 MICROgram(s) HFA Inhaler 4 Puff(s) Inhalation every 6 hours  calcitriol  Solution 0.25 MICROGram(s) Oral daily  calcium carbonate    500 mG (Tums) Chewable 3 Tablet(s) Chew every 8 hours  chlorhexidine 0.12% Liquid 15 milliLiter(s) Oral Mucosa two times a day  chlorhexidine 0.12% Liquid 15 milliLiter(s) Oral Mucosa two times a day  chlorhexidine 4% Liquid 1 Application(s) Topical daily  chlorhexidine 4% Liquid 1 Application(s) Topical <User Schedule>  cyanocobalamin 1000 MICROGram(s) Oral daily  dexMEDEtomidine Infusion 0.2 MICROgram(s)/kG/Hr (3.61 mL/Hr) IV Continuous <Continuous>  dextrose 5%. 1000 milliLiter(s) (50 mL/Hr) IV Continuous <Continuous>  dextrose 50% Injectable 12.5 Gram(s) IV Push once  dextrose 50% Injectable 25 Gram(s) IV Push once  dextrose 50% Injectable 25 Gram(s) IV Push once  fentaNYL   Infusion. 0.5 MICROgram(s)/kG/Hr (3.61 mL/Hr) IV Continuous <Continuous>  gabapentin 300 milliGRAM(s) Oral at bedtime  influenza   Vaccine 0.5 milliLiter(s) IntraMuscular once  insulin lispro (HumaLOG) corrective regimen sliding scale   SubCutaneous three times a day before meals  insulin regular Infusion 1 Unit(s)/Hr (1 mL/Hr) IV Continuous <Continuous>  ipratropium 17 MICROgram(s) HFA Inhaler 4 Puff(s) Inhalation every 6 hours  lactated ringers Bolus 500 milliLiter(s) IV Bolus once  meropenem  IVPB      meropenem  IVPB 1000 milliGRAM(s) IV Intermittent every 8 hours  methylPREDNISolone sodium succinate Injectable 30 milliGRAM(s) IV Push daily  multivitamin/minerals 1 Tablet(s) Oral daily  mupirocin 2% Ointment 1 Application(s) Topical two times a day  norepinephrine Infusion 0.05 MICROgram(s)/kG/Min (3.384 mL/Hr) IV Continuous <Continuous>  pantoprazole   Suspension 40 milliGRAM(s) Oral before breakfast    MEDICATIONS  (PRN):  acetaminophen   Tablet .. 650 milliGRAM(s) Oral every 6 hours PRN Temp greater or equal to 38C (100.4F)  dextrose 40% Gel 15 Gram(s) Oral once PRN Blood Glucose LESS THAN 70 milliGRAM(s)/deciliter  glucagon  Injectable 1 milliGRAM(s) IntraMuscular once PRN Glucose LESS THAN 70 milligrams/deciliter      ALLERGIES:  Allergies    No Known Allergies    Intolerances        OBJECTIVE:  ICU Vital Signs Last 24 Hrs  T(C): 37.1 (09 Mar 2020 08:00), Max: 37.8 (08 Mar 2020 21:00)  T(F): 98.7 (09 Mar 2020 08:00), Max: 100 (08 Mar 2020 21:00)  HR: 110 (09 Mar 2020 13:00) (70 - 127)  BP: --  BP(mean): --  ABP: 164/74 (09 Mar 2020 13:00) (86/42 - 216/90)  ABP(mean): 110 (09 Mar 2020 13:00) (56 - 138)  RR: 20 (09 Mar 2020 13:00) (20 - 56)  SpO2: 99% (09 Mar 2020 13:00) (82% - 100%)    Mode: AC/ CMV (Assist Control/ Continuous Mandatory Ventilation)  RR (machine): 20  TV (machine): 500  FiO2: 60  PEEP: 10  ITime: 1  MAP: 22  PIP: 36    Adult Advanced Hemodynamics Last 24 Hrs  CVP(mm Hg): --  CVP(cm H2O): --  CO: --  CI: --  PA: --  PA(mean): --  PCWP: --  SVR: --  SVRI: --  PVR: --  PVRI: --  CAPILLARY BLOOD GLUCOSE      POCT Blood Glucose.: 230 mg/dL (09 Mar 2020 12:26)  POCT Blood Glucose.: 115 mg/dL (09 Mar 2020 06:45)  POCT Blood Glucose.: 85 mg/dL (09 Mar 2020 05:01)  POCT Blood Glucose.: 55 mg/dL (09 Mar 2020 04:28)  POCT Blood Glucose.: 66 mg/dL (09 Mar 2020 04:04)  POCT Blood Glucose.: 127 mg/dL (09 Mar 2020 02:58)  POCT Blood Glucose.: 214 mg/dL (09 Mar 2020 01:47)  POCT Blood Glucose.: 246 mg/dL (09 Mar 2020 00:05)  POCT Blood Glucose.: 176 mg/dL (08 Mar 2020 22:52)  POCT Blood Glucose.: 107 mg/dL (08 Mar 2020 20:01)  POCT Blood Glucose.: 164 mg/dL (08 Mar 2020 18:42)    CAPILLARY BLOOD GLUCOSE      POCT Blood Glucose.: 230 mg/dL (09 Mar 2020 12:26)    I&O's Summary    08 Mar 2020 07:01  -  09 Mar 2020 07:00  --------------------------------------------------------  IN: 1864.3 mL / OUT: 880 mL / NET: 984.3 mL    09 Mar 2020 07:01  -  09 Mar 2020 17:40  --------------------------------------------------------  IN: 760.9 mL / OUT: 122 mL / NET: 638.9 mL      Daily     Daily     PHYSICAL EXAMINATION:  General: NAD  HEENT: sluggish pupils  Neurology: sedated  Respiratory: b/l breath sounds  CV: RRR, S1S2, no murmurs, rubs or gallops  Abdominal: Soft, ND  Extremities: edema, + peripheral pulses  Incisions:   Tubes: intubated    LABS:  ABG - ( 09 Mar 2020 04:25 )  pH, Arterial: 7.39  pH, Blood: x     /  pCO2: 48    /  pO2: 64    / HCO3: 29    / Base Excess: 3.5   /  SaO2: 96                                      8.0    6.99  )-----------( 98       ( 09 Mar 2020 04:30 )             25.5     03-09    134<L>  |  100  |  52<H>  ----------------------------<  46<L>  5.0   |  26  |  <0.5<L>    Ca    7.3<L>      09 Mar 2020 04:30  Mg     2.0     03-09    TPro  4.8<L>  /  Alb  1.7<L>  /  TBili  0.6  /  DBili  x   /  AST  23  /  ALT  33  /  AlkPhos  451<H>  03-09    LIVER FUNCTIONS - ( 09 Mar 2020 04:30 )  Alb: 1.7 g/dL / Pro: 4.8 g/dL / ALK PHOS: 451 U/L / ALT: 33 U/L / AST: 23 U/L / GGT: x                       TELEMETRY:     EKG:     IMAGING:

## 2020-03-09 NOTE — PROGRESS NOTE ADULT - ASSESSMENT
75 y/o female with pmhx of asthma, HTN,  autoimmune hepatitis, heterozygous prothrombin gene mutation with hx of PE and DVT on coumadin admitted for SOB     Acute hypoxic resp failure with hypoxia due to influenza, HCAP; 10 reintubation secondary to acute PE  Septic shock in immunocompromised patient, Severe sepsis present on admission   - completed course of treatment- antibiotics and antiviral completed for initial infection  - MRSA VAP treated with Zyvox for 10 days   - remains intubated and sedated- & failing initial weaning attempts  - PE, HCAP pneumonia, GNR on BAL : Klebsiella   - GFF placed due to PE   - remains on IV Solu-medrol  - ID f/u noted, remains on Meropenem  - continue Albuterol  - Pulmonary/cc plan bronchoscopy to try and clear secretions and optimize another extubation attempt  -Spoke to  and son who are feeling liberation may be the correct choice and are strongly considering at this time-this differs from last weeks opinion to forge on    Multiple Skin tears/ wounds  - local care  - elevate extremities  - await burn evaluation    Candedemia  - central line change noted  - cultures negative x2  - 2D echo no evidence of vegetation   - ophth evaluation noted, no clinical evidence, low suspicion for ocular fungemia/endogenous endophthalmitis   - completed treatment  2/27    REJI on CKD 3  - creatinine normal  - renal f/u noted  - urine output noted  - continue to monitor  - follow renal recommendations    Acute Blood Loss Anemia  - had rectal tube earlier on this adm which was discontinued due to ulcer  - on Protonix  - has had multiple PRBC's this adm    Autoimmune hepatitis  - On prednisone 60 mg PO qd and Tacrolimus at home  - now on Solu-medrol  - Tacrolimus held    HTN by hx  - now off Labetolol    Heterozygous prothrombin gene mutation with hx of PE and DVT on coumadin:  - Coumadin had been held for alveolar hemorrhage     Protein Malnutrition  - Alb noted, trending back up  - tolerating enteral feedings    Diet: Enteral feedings   GI PPx: Protonix  DVT PPx: sequentials  Activity: bedrest  Dispo: readmitted to hospital from  rehab at [facility], remains ICU      Continue supportive measures, patient remains acutely ill,  Overall prognosis poor.     Last week-Had prolonged and niki discussion with patient's , Alejo.  We discussed his prior feelings about prolonged debilitating situation.  At this point pulmonary/critical care tries to reattempt extubation and weaning over the coming few days to 1 week.  At some point patient may require tracheostomy and that was discussed.  Potential outcomes with tracheostomy were discussed discussed including waking up being communicative and slowly improving and possibly weaning versus remaining dependent on tracheostomy for prolonged period of time.  There is also the possibility of the patient not waking him much at all once sedatives are held.  Patient understands these choices -;last week wanted to forge ahead including resuscitation This is now strongly rethinking this option.  Dr. Price remains in Frequent medication with the family.

## 2020-03-09 NOTE — PROGRESS NOTE ADULT - ASSESSMENT
74y female with PMH of asthma, COPD not on home O2, autoimmune hepatitis on prednisone and tacrolimus, heterozygous prothrombin gene mutation with hx of PE and DVT on coumadin presents to the hospital complaining of cough, hemoptysis. Found to have viral pneumonia with respiratory failure and intubated. Extubated after prolonged course but required re-intubation 2/26 for worsening respiratory status and received IVC filter 2/26 because pt not safe for AC (alveolar hemorrhage).  As per palliative, Pt's family will continue treatment course but may pursue trial of extubation.      #) Diffuse Alveoloar hemorrhage  - completed Zyvox, Levaquin, Cefepime course, and oseltamivir   - C/w Duoneb   - Solumedrol IV 40mg q24hr  - Bronch results - neg culture, neg fungal, cytology positive for inflammatory cells, no organisms  - Bronch 2/16 showed bleeding from DAH  - Bronch 2/26 for BAL, cultures show gram - rods, start meropenem 2/27      #) Opacities Lung  - wbc increased from 4.03 (3/4) to 9.81 (3/5)  - bronchoscopy 3/5 for new right upper opacity on cxr, bronchial aspirate sent, f/u cultures  - start vancomycin, continue meropenem, f/u vanc levels before 30 mins before 4th dose (3/6 at 5:30 PM)  - 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  - started on nimbex drip 3/6 for persistent sedation and agitation on ventilator  - pending bronchial cultures from bronchoscopy 3/5    #) Tachycardia  - start cardizem 60 mg q8  - IV 10 metoprolol pushes if persistent tachy    #) Candidemia, secondary to central line  - cultures neg since 2/14  - oral fluconazole 14 days, switched to caspofungin for rising LFT (2/26), completed 2/27  - ID following  - repeat fungitell 3/4, continue caspofungin    #) Rectal Bleeding   - GI following   - received 1U of blood 2/23, hemoglobin stable  - Protonix 40mg PO qd   - FlexSig showed 2 rectal ulcers one clean one crater, s/p clipping  - CBC q24  - Avoid Dignshield or rectal tube   - s/p sigmoidoscopy recurrent rectal bleeding possibly from diverticulosis 2/28, received 2U pRBC,   - will need CT angio if bleeding again, f/u CBC    #) 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  - duplex shows DVT consistent with prior  - not candidate for AC at this time due to alveolar hemorrhage   - IVC filter placed 2/26    #Immunosuppressed: Autoimmune Hepatitis   - solumedrol 60 mg qd  - CMV PCR neg this admission     #Heterozygous prothrombin gene mutation with hx of PE and DVT on coumadin  - holding coumadin for now due to alveolar hemorrhage   - monitor coags    #0.5 cm of GB polyp  - repeat US abdomen in 6 months per GI    #Deconditioning   - pt severely deconditioned due to prolonged hospital stay  - PT/Rehab     #) MISC  Activity: intubated  DVT ppx: IVC filter  GI ppx: protonix  Diet: tube feeding  Code: Full  Dispo: medical optimization  CHG

## 2020-03-09 NOTE — PROGRESS NOTE ADULT - ASSESSMENT
ASSESSMENT  73y/o F w/ hx of asthma, COPD not on home O2, autoimmune hepatitis on prednisone and tacrolimus, heterozygous prothrombin gene mutation with hx of PE and DVT on coumadin with recent hospitalization in January 2020 with a diagnosis of pneumonia presents for worsening SOB, hemoptysis and cough.    IMPRESSION  #GNR PNA- BAL 2/26 with   Numerous Klebsiella pneumoniae (panS)    s/p bronch 3/5, NG    Fungal cx from bronch -   Rare Yeast likely colonization    Acute hypoxemia, intubated 2/26, possible PE given +DVTs s/p Option Elite IVC filter via Right IJ access.  #Diarrhea   #1/16 Bronch cx AFB + 1/2 specimens    Possible MAC, given CT findings and +bronch specimen, would qualify for treatment per guidelines  #GIB  #Thrombocytopenia    continues post stopping linezolid   #Hx recent MRSA VAP    2/14 tracheal cx   Moderate Methicillin resistant Staphylococcus aureus    completed 8 day course, noted to have thrombocytopenia on linezolid  #Candidemia Fungitell: >500 (02-11-20 @ 11:21) likely CLABSI    2/11 BCX +, 2/12 BCX +, 2/13 BCX (-) RIJ 2/11. Groin a-line 2/4- removed 2/13     Fungitell: <31 (02-04-20 @ 04:40), Fungitell: 49 (02-01-20 @ 11:17)    Ophtho- no endophthalmitis   #Flu + AH1, Alveolar hemorrhage, ?superimposed atypical PNA (imaging not really consistent with bacterial PNA). Recent bronch 1/20 negative for PCP, Fungal, etc, previous bronch cx with yeast (likely oral contaminant) since GMS neg    2/3 Bronch   No organisms seen, 2/3 cytopath Rare atypical cells, few ciliated bronchial cells, alveolar macrophages and inflammatory cells, mainly neutrophils.    Strep urine Ag neg, serum crypto Ag neg, CMV PCR undetectable, AFB neg    Quantiferon indeterminate    Lactate Dehydrogenase, Serum: 607 (02.03.20 @ 04:30)    Procalcitonin, Serum: 1.86:    CTA chest showing no PE but showing interval development of multifocal bilateral groundglass opacities as well as new moderate to severe bronchiectasis in the right lung,   1/13 CT b/l GGOs, compatible with intersitial edema      During last hospitalization: bronch cx bacterial NG, +yeast, no ID, neg legionella, + MRSA PCR    MRSA PCR Result.: Positive (02-01-20 @ 20:40)  #Severe sepsis on admission P>90, WBC 19, RR>20, lactic acidosis Lactate, Blood: 2.9 mmol/L (01-31-20 @ 09:25)    afebrile   #Elevated Alk ph, transaminitis  #LLE wound, not infection     12/7 WCX MSSA, Kleb, bacteroides    8/2019 MRSA in a wound   #Immunosuppressed: autoimmune hepatitis on prednisone and tacrolimus    RECOMMENDATIONS  - On Jorge 1g q8h IV  (abx 2/27-) 14 days is 3/11  - Repeat serum fungitell f/u   - f/u AFB from 1/15 ID,  (possible MAC vs other NTM) , would hold off empiric therapy in the setting of acute illnesss  - grave prognosis    Spectra [id number]

## 2020-03-09 NOTE — PROGRESS NOTE ADULT - SUBJECTIVE AND OBJECTIVE BOX
Chart reviewed, patient examined. Pertinent results reviewed.  Case discussed with HO; specialist f/u reviewed  HD#[id number]  Patient making no progress in weaning or getting off the respirator in the last week.     HPI:  75y/o F w/ hx of asthma, COPD not on home O2, autoimmune hepatitis on prednisone and tacrolimus, heterozygous prothrombin gene mutation with hx of PE and DVT on coumadin with recent hospitalization in January 2020 with a diagnosis of pneumonia presents for worsening SOB, hemoptysis and cough. Everything started yesterday night when patient was in bed, started to feel shortness of breath and had many episodes of hemoptysis with clots, she also had substernal chest pain non radiating, moderate in intensity that worsens on coughing. She denies fever but endorses chills. She also admits for lightheadedness that occurred yesterday, no HA, abd pain, dysuria or paresthesias. She had 2 loose bowel movements since yesterday with some blood in the stools that she attributes to her hemorrhoids. She stopped Coumadin couple of days ago since her INR was supratherapeutic. She is from [place] and also mentions that her  and another family member have the flu and she was exposed to them. She did not have the flu shot this season.  In ED, temp was 100.1 rectally, tachycardic ( sinus) , she was saturating to 70s on non rebreather and her SaO2 went up to 95%. ABG showing respiratory alkalosis initially and then corrected after BIPAP placement and correction of RR.  CTA chest showing no PE but showing interval development of multifocal bilateral groundglass opacities as well as new moderate to severe bronchiectasis in the right lung. Findings are concerning for an acute infectious/inflammatory process. (31 Jan 2020 14:36)  INTERVAL EVENTS: Patient seen today remains vented on FIO2 50%, sedated.     MEDICATIONS  (STANDING):  ALBUTerol    90 MICROgram(s) HFA Inhaler 4 Puff(s) Inhalation every 6 hours  calcitriol  Solution 0.25 MICROGram(s) Oral daily  calcium carbonate    500 mG (Tums) Chewable 3 Tablet(s) Chew every 8 hours  chlorhexidine 0.12% Liquid 15 milliLiter(s) Oral Mucosa two times a day  chlorhexidine 0.12% Liquid 15 milliLiter(s) Oral Mucosa two times a day  chlorhexidine 4% Liquid 1 Application(s) Topical daily  chlorhexidine 4% Liquid 1 Application(s) Topical <User Schedule>  cyanocobalamin 1000 MICROGram(s) Oral daily  dexMEDEtomidine Infusion 0.5 MICROgram(s)/kG/Hr (9.025 mL/Hr) IV Continuous <Continuous>  dextrose 5%. 1000 milliLiter(s) (50 mL/Hr) IV Continuous <Continuous>  dextrose 50% Injectable 12.5 Gram(s) IV Push once  dextrose 50% Injectable 25 Gram(s) IV Push once  dextrose 50% Injectable 25 Gram(s) IV Push once  fentaNYL   Infusion. 0.5 MICROgram(s)/kG/Hr (3.61 mL/Hr) IV Continuous <Continuous>  gabapentin 300 milliGRAM(s) Oral at bedtime  influenza   Vaccine 0.5 milliLiter(s) IntraMuscular once  insulin lispro (HumaLOG) corrective regimen sliding scale   SubCutaneous three times a day before meals  insulin regular Infusion 1 Unit(s)/Hr (1 mL/Hr) IV Continuous <Continuous>  ipratropium 17 MICROgram(s) HFA Inhaler 4 Puff(s) Inhalation every 6 hours  lactated ringers Bolus 500 milliLiter(s) IV Bolus once  meropenem  IVPB      meropenem  IVPB 1000 milliGRAM(s) IV Intermittent every 8 hours  methylPREDNISolone sodium succinate Injectable 30 milliGRAM(s) IV Push daily  multivitamin/minerals 1 Tablet(s) Oral daily  mupirocin 2% Ointment 1 Application(s) Topical two times a day  norepinephrine Infusion 0.05 MICROgram(s)/kG/Min (3.384 mL/Hr) IV Continuous <Continuous>  pantoprazole   Suspension 40 milliGRAM(s) Oral before breakfast    ICU Vital Signs Last 24 Hrs  T(C): 37.1 (09 Mar 2020 08:00), Max: 37.8 (08 Mar 2020 21:00)  T(F): 98.7 (09 Mar 2020 08:00), Max: 100 (08 Mar 2020 21:00)  HR: 100 (09 Mar 2020 09:15) (70 - 127)  BP: 153/74 (08 Mar 2020 12:00) (153/74 - 153/74)  BP(mean): 101 (08 Mar 2020 12:00) (101 - 101)  ABP: 174/76 (09 Mar 2020 09:15) (86/42 - 216/90)  ABP(mean): 114 (09 Mar 2020 09:15) (56 - 138)  RR: 27 (09 Mar 2020 09:15) (20 - 56)  SpO2: 97% (09 Mar 2020 09:15) (82% - 100%)    PHYSICAL EXAM:  GENERAL: Vented, Sedated- on 50%O2; ETT/OGT  NECK: Supple, No JVD  CHEST/LUNG: Rhonchi, + bilat air mvt-fair  HEART: S1, S2, RRR  ABDOMEN: Soft, Nontender,  Bowel sounds present  EXTREMITIES: + Anasarca, Bruised, Multiple skin tears  SKIN: Multiple skin lesions- Purpuric lesions over trunk and extremities- Dry crusted avulsions scattered    LABS:                            8.0    6.99  )-----------( 98       ( 09 Mar 2020 04:30 )        03-09    134<L>  |  100  |  52<H>  ----------------------------<  46<L>  5.0   |  26  |  <0.5<L>    Ca    7.3<L>      09 Mar 2020 04:30  Mg     2.0     03-09    TPro  4.8<L>  /  Alb  1.7<L>  /  TBili  0.6  /  DBili  x   /  AST  23  /  ALT  33  /  AlkPhos  451<H>  03-09    ABG - ( 09 Mar 2020 04:25 )  pH, Arterial: 7.39  pH, Blood: x     /  pCO2: 48    /  pO2: 64    / HCO3: 29    / Base Excess: 3.5   /  SaO2: 96                    Culture - Bronchial (02.26.20 @ 14:45)    -  Amoxicillin/Clavulanic Acid: S <=8/4    -  Amikacin: S <=16    Gram Stain:   Moderate polymorphonuclear leukocytes seen per low power field  Rare Squamous epithelial cells seen per low power field  Numerous Gram Negative Rods seen per oil power field    -  Ciprofloxacin: S <=1    -  Aztreonam: S <=4    -  Cefepime: S <=2    -  Trimethoprim/Sulfamethoxazole: S <=2/38    -  Ertapenem: S <=0.5    -  Gentamicin: S <=2    -  Imipenem: S <=1    -  Levofloxacin: S <=2    -  Meropenem: S <=1    -  Piperacillin/Tazobactam: S <=8    -  Tobramycin: S <=2    -  Ampicillin: R >16 These ampicillin results predict results for amoxicillin    -  Cefazolin: S <=2 Enterobacter, Citrobacter, and Serratia may develop resistance during prolonged therapy (3-4 days)    -  Cefoxitin: S <=8    -  Ampicillin/Sulbactam: S 8/4 Enterobacter, Citrobacter, and Serratia may develop resistance during prolonged therapy (3-4 days)    -  Ceftriaxone: S <=1 Enterobacter, Citrobacter, and Serratia may develop resistance during prolonged therapy    Specimen Source: .Bronchial None    Culture Results:   Numerous Klebsiella pneumoniae  Normal Respiratory Nikki present    Organism Identification: Klebsiella pneumoniae    Organism: Klebsiella pneumoniae    Method Type: EROS      RADIOLOGY & ADDITIONAL TESTS:  < from: Xray Chest 1 View- PORTABLE-Routine (03.03.20 @ 05:04) >  Findings:    Support devices: Stable right IJ central venous catheter and enteric tube. Endotracheal tube tip, approximately 2.6 cm from baljinder.    Cardiac/mediastinum/hilum: Limited evaluation, heart borders are obscured.    Lungparenchyma/Pleura: Unchanged diffuse bilateral opacities. No pneumothorax.    Skeleton/soft tissues: Unchanged.    Impression:      Unchanged diffuse bilateral opacities. No pneumothorax.    Support tubes as per above.        < end of copied text >

## 2020-03-09 NOTE — CHART NOTE - NSCHARTNOTEFT_GEN_A_CORE
Registered Dietitian Follow-Up    ***Scroll to the bottom for RD recommendation***    Patient Profile Reviewed                           Yes [x]   No []  Nutrition History Previously Obtained        Yes [x]  No []          PERTINENT SUBJECTIVE INFORMATION (LATEST):  - No family at bedside.   - Pt continues to be intubated to ventilator.   - On  fentanyl, precedex. Ve 13.8, /76, .,   HgbA1c is 6.8, no noted Hx of DM2.   - Pt has been on Osmolite 1.5 at 40cc/hr (NGT) + Prosource TF x3 (since 3/2) - which is what RD recommended.         PERTINENT MEDICAL INFORMATIONS:  (nothing new here)  (1) p/w SOB+ desat. Pt was hypotensive and hypoxic and agitated, started versed and levophed.  (2) Acute hypoxic resp/ ARDS.   (3) Diffuse alveolar hemorrhage c/w duoneb. S/p bronch.  (4) Tachy, started meds. ID following for candidemia. GI following for rectal bleeding.  (5) S/p Sigmoidoscopy for possible diverticulosis 2/28.   (5) Deconditoning - prolonged hospital stay.  (6) PT/Rehab.           DIET ORDER:  Osmolite 1.5 at 40cc/hr (NGT) + Prosource TF x3 (since 3/2) = 1540 kcal/ 93g protein        ANTHROPOMETRICS:  - Ht.  162.6cm  - Wt.   (2/1): 74.8kg   (2/2): 74.3kg  (2/3): 81.5kg  (2/4): 78.4kg  (2/8): 87.7kg  (2/10): 86.7kg  (2/16): 86kg   (2/19): 87.9kg  (2/21): 81.5kg  (2/24): 74.5kg  (2/27): 71.3kg  (2/26): 72.2kg  (3/5): 71.9kg  (3/8): 83.3kg - pt has edema ongoing, and weight appears to be trending/fluctuating. It is likely that pt is losing weight. will monitor  - BMI. 28- 30.8  - IBW. 54.5kg       PERTINENT LAB DATA:  3/9: h/h 8.0/25.5, Na 134, BUN 52, Cr 0.5, glucose 46, Ca 7.3, Albumin 1.7  PERTINENT MEDS:   LR, abx, d5w, insulin, methylprednisolone, precedex, fentanyl, protonix, acetaminophen, adriano carbonate, b12, MVI      PHYSICAL FINDINGS  - APPEARANCE:        intubated to ventilator, 1+ generalized, 2+ b/l arm edema  - GI FUNCTION:       LBM 3/7 per EMR  - TUBES:                     NGT  - ORAL/MOUTH:      NPO, failed SLP 2/24  - SKIN:                        (NEW) Stage 2 sacrum pressure ulcer + wounds        NUTRITION REQUIREMENTS  WEIGHT USED:                          Ht: 162.6cm, Wt: 74.8kg (more likely pt's wt PTA, BMI: 28.5)  ESTIMATED ENERGY NEEDS:       CONTINUE [  ]      ADJUST [ x ]  - intubated since 2/27    ESTIMATED ENERGY NEEDS:         1667 kcal/day ([id number])  -- was 1571  ESTIMATED PROTEIN NEEDS:         g/day (1.2-1.4 g/kg of ABW) - improved renal status, this range still feasible for the new pressure ulcer  ESTIMATED FLUID NEEDS:             fluid per ICU team    CURRENT NUTRIENT NEEDS:    1540/ 93g protein at this time          [  x] PREVIOUS NUTRITION DIAGNOSIS:    (1) Inadequate energy intake  - IMPROVED            [x  ] ONGOING        [  ] RESOLVED    (NEW PES): (2) Increased nutrient needs for kcal/protein related to wound healing as evidenced by Wound stage 2 pressure ulcer to sacrum.          PATIENT INTERVENTION:    [  ] ORAL        [ x] EN/TF     GOAL/EXPECTED OUTCOME:     pt continues to meet and tolerate >85% of estimated kcal/protein via TF upon f/u in 3 days. Pt to have 1BM/day.   INDICATOR/MONITORING:       RD to monitor diet order, energy intake, body composition, nutrition focused physical findings (EN tolerance, s/s, renal profile)  NUTRITION INTERVENTION:        Enteral nutrition      RECS: (1) Patient has been documented with Bowel movement, therefore, does not warrant to switch formula at this time. Therefore, continue current OSMOLITE 1.5 at 40cc/hr (NGT) with Prosource TF packet x3/day. This gives a total of 1540 kcal/ 93g protein/ 730mL free water, additional flushes per ICU team.

## 2020-03-09 NOTE — PROGRESS NOTE ADULT - ASSESSMENT
IMPRESSION:    Acute hypoxic respiratory failure likely VTE SP GFF   Klebsiella in BAL treated   DAH resolving  Candidemia SP therapy   HO Autoimmune hepatitis on tacrolimus and solumedrol  Possible recurrent septic shock secondary to pneumonia       PLAN:    CNS:  SAT     HEENT: ET care.  Oral care.      PULMONARY:  HOB @ 45 degrees. Continue Solumedrol 30 mg daily.  .  RR 20.  PEEP 8. Wean O2.  Repeat ABG     CARDIOVASCULAR:  I=O.     GI: GI prophylaxis.  Feeding     RENAL:  Follow up lytes. Replete as needed.     INFECTIOUS DISEASE: Follow up cultures.  Finish Meropenem course.     HEMATOLOGICAL:  DVT prophylaxis.  FU CBC and coags     ENDOCRINE:  Follow up FS.  Insulin protocol if needed.    MUSCULOSKELETAL: Bed rest     Prognosis: Very Poor prognosis overall

## 2020-03-10 NOTE — PROGRESS NOTE ADULT - SUBJECTIVE AND OBJECTIVE BOX
Patient is a 75y old  Female who presents with a chief complaint of SOB and desaturation (10 Mar 2020 09:42)      Subjective: She still remains intubated and is on pressor support  No fever/chills  No other overnight events noted       Vital Signs Last 24 Hrs  T(C): 36.6 (10 Mar 2020 08:00), Max: 36.7 (09 Mar 2020 20:00)  T(F): 97.9 (10 Mar 2020 08:00), Max: 98 (09 Mar 2020 20:00)  HR: 83 (10 Mar 2020 09:52) (76 - 118)  BP: 189/79 (10 Mar 2020 08:30) (189/79 - 189/79)  BP(mean): 128 (10 Mar 2020 08:30) (128 - 128)  RR: 23 (10 Mar 2020 09:15) (19 - 45)  SpO2: 94% (10 Mar 2020 09:52) (84% - 100%)    PHYSICAL EXAM  General: critically ill apearing/sick looking   HEENT: Intubated  Lungs: positive air movement b/l ant lungs  Abdomen: soft  : henley catheter +  Ext: chronic wounds covered in dressings in b/l LE   Skin: multiple ecchymoses  Neuro: sedated          MEDICATIONS  (STANDING):  ALBUTerol    90 MICROgram(s) HFA Inhaler 4 Puff(s) Inhalation every 6 hours  azithromycin   Tablet 500 milliGRAM(s) Oral daily  calcitriol  Solution 0.25 MICROGram(s) Oral daily  calcium carbonate    500 mG (Tums) Chewable 3 Tablet(s) Chew every 8 hours  chlorhexidine 0.12% Liquid 15 milliLiter(s) Oral Mucosa two times a day  chlorhexidine 4% Liquid 1 Application(s) Topical daily  chlorhexidine 4% Liquid 1 Application(s) Topical <User Schedule>  cyanocobalamin 1000 MICROGram(s) Oral daily  dexMEDEtomidine Infusion 0.2 MICROgram(s)/kG/Hr (3.61 mL/Hr) IV Continuous <Continuous>  dextrose 5%. 1000 milliLiter(s) (50 mL/Hr) IV Continuous <Continuous>  dextrose 50% Injectable 12.5 Gram(s) IV Push once  dextrose 50% Injectable 25 Gram(s) IV Push once  dextrose 50% Injectable 25 Gram(s) IV Push once  ethambutol 1000 milliGRAM(s) Oral daily  fentaNYL   Infusion. 0.5 MICROgram(s)/kG/Hr (3.61 mL/Hr) IV Continuous <Continuous>  gabapentin 300 milliGRAM(s) Oral at bedtime  influenza   Vaccine 0.5 milliLiter(s) IntraMuscular once  insulin regular Infusion 4 Unit(s)/Hr (4 mL/Hr) IV Continuous <Continuous>  ipratropium 17 MICROgram(s) HFA Inhaler 4 Puff(s) Inhalation every 6 hours  meropenem  IVPB      meropenem  IVPB 1000 milliGRAM(s) IV Intermittent every 8 hours  methylPREDNISolone sodium succinate Injectable 30 milliGRAM(s) IV Push daily  multivitamin/minerals 1 Tablet(s) Oral daily  mupirocin 2% Ointment 1 Application(s) Topical two times a day  norepinephrine Infusion 0.05 MICROgram(s)/kG/Min (3.384 mL/Hr) IV Continuous <Continuous>  pantoprazole   Suspension 40 milliGRAM(s) Oral before breakfast  rifAMPin 600 milliGRAM(s) Oral daily    MEDICATIONS  (PRN):  acetaminophen   Tablet .. 650 milliGRAM(s) Oral every 6 hours PRN Temp greater or equal to 38C (100.4F)  dextrose 40% Gel 15 Gram(s) Oral once PRN Blood Glucose LESS THAN 70 milliGRAM(s)/deciliter  glucagon  Injectable 1 milliGRAM(s) IntraMuscular once PRN Glucose LESS THAN 70 milligrams/deciliter      LABS:                          7.4    5.61  )-----------( 84       ( 10 Mar 2020 04:05 )             22.9         Mean Cell Volume : 88.1 fL  Mean Cell Hemoglobin : 28.5 pg  Mean Cell Hemoglobin Concentration : 32.3 g/dL  Auto Neutrophil # : 4.96 K/uL  Auto Lymphocyte # : 0.45 K/uL  Auto Monocyte # : 0.08 K/uL  Auto Eosinophil # : 0.00 K/uL  Auto Basophil # : 0.01 K/uL  Auto Neutrophil % : 88.4 %  Auto Lymphocyte % : 8.0 %  Auto Monocyte % : 1.4 %  Auto Eosinophil % : 0.0 %  Auto Basophil % : 0.2 %      Serial CBC's  03-10 @ 04:05  Hct-22.9 / Hgb-7.4 / Plat-84 / RBC-2.60 / WBC-5.61  Serial CBC's  03-09 @ 04:30  Hct-25.5 / Hgb-8.0 / Plat-98 / RBC-2.95 / WBC-6.99  Serial CBC's  03-08 @ 05:00  Hct-25.0 / Hgb-7.9 / Plat-69 / RBC-2.90 / WBC-3.70  Serial CBC's  03-07 @ 04:00  Hct-24.1 / Hgb-7.6 / Plat-64 / RBC-2.69 / WBC-5.59      03-10    129<L>  |  97<L>  |  47<H>  ----------------------------<  405<H>  4.9   |  23  |  0.5<L>    Ca    6.9<L>      10 Mar 2020 04:05  Mg     2.0     03-09    TPro  4.0<L>  /  Alb  1.5<L>  /  TBili  0.5  /  DBili  0.3<H>  /  AST  17  /  ALT  24  /  AlkPhos  310<H>  03-10                                  BLOOD SMEAR INTERPRETATION:       RADIOLOGY & ADDITIONAL STUDIES:

## 2020-03-10 NOTE — PROGRESS NOTE ADULT - ASSESSMENT
ASSESSMENT  75y/o F w/ hx of asthma, COPD not on home O2, autoimmune hepatitis on prednisone and tacrolimus, heterozygous prothrombin gene mutation with hx of PE and DVT on coumadin with recent hospitalization in January 2020 with a diagnosis of pneumonia presents for worsening SOB, hemoptysis and cough.    IMPRESSION  #GNR PNA- BAL 2/26 with   Numerous Klebsiella pneumoniae (panS)    s/p bronch 3/5, NG    Fungal cx from bronch - repeat fungitell Fungitell: 204 (03-06-20 @ 15:10)    Acute hypoxemia, intubated 2/26, possible PE given +DVTs s/p Option Elite IVC filter via Right IJ access.  #Diarrhea   #1/16 Bronch MAC    given CT findings and +bronch specimen, would qualify for treatment per guidelines  #GIB, resolved  #Thrombocytopenia    continues post stopping linezolid   #Hx recent MRSA VAP    2/14 tracheal cx   Moderate Methicillin resistant Staphylococcus aureus    completed 8 day course, noted to have thrombocytopenia on linezolid  #Candidemia Fungitell: >500 (02-11-20 @ 11:21) likely CLABSI    2/11 BCX +, 2/12 BCX +, 2/13 BCX (-) RIJ 2/11. Groin a-line 2/4- removed 2/13     Fungitell: <31 (02-04-20 @ 04:40), Fungitell: 49 (02-01-20 @ 11:17)    Ophtho- no endophthalmitis   #Flu + AH1, Alveolar hemorrhage; bronch 1/20 negative for PCP, Fungal, etc, previous bronch cx with yeast (likely oral contaminant) since GMS neg    2/3 Bronch   No organisms seen, 2/3 cytopath Rare atypical cells, few ciliated bronchial cells, alveolar macrophages and inflammatory cells, mainly neutrophils.    Strep urine Ag neg, serum crypto Ag neg, CMV PCR undetectable, AFB neg    Quantiferon indeterminate    Lactate Dehydrogenase, Serum: 607 (02.03.20 @ 04:30)    Procalcitonin, Serum: 1.86:    CTA chest showing no PE but showing interval development of multifocal bilateral groundglass opacities as well as new moderate to severe bronchiectasis in the right lung,   1/13 CT b/l GGOs, compatible with intersitial edema      During last hospitalization: bronch cx bacterial NG, +yeast, no ID, neg legionella, + MRSA PCR    MRSA PCR Result.: Positive (02-01-20 @ 20:40)  #Severe sepsis on admission P>90, WBC 19, RR>20, lactic acidosis Lactate, Blood: 2.9 mmol/L (01-31-20 @ 09:25)    afebrile   #Elevated Alk ph, transaminitis  #LLE wound, not infection     12/7 WCX MSSA, Kleb, bacteroides    8/2019 MRSA in a wound   #Immunosuppressed: autoimmune hepatitis on prednisone and tacrolimus    RECOMMENDATIONS  - On Jorge 1g q8h IV  (abx 2/27-) 14 days is 3/11  - repeat fungitell still elevated, yeast in bronch, unclear what to make of this -- kinetics of serum BG are difficult to relate to clinical outcome. if hemodynamic compromise, consider adding back antifungal coverage with caspo  - +MAC 1/15 Bronch, will discuss starting therapy with Azithro 500mg daily and Ethambutol (15 mg/kg daily) and Rifampin (600 mg daily) with Pulm  - grave prognosis    Spectra [id number] ASSESSMENT  73y/o F w/ hx of asthma, COPD not on home O2, autoimmune hepatitis on prednisone and tacrolimus, heterozygous prothrombin gene mutation with hx of PE and DVT on coumadin with recent hospitalization in January 2020 with a diagnosis of pneumonia presents for worsening SOB, hemoptysis and cough.    IMPRESSION  #GNR PNA- BAL 2/26 with   Numerous Klebsiella pneumoniae (panS)    s/p bronch 3/5, NG    Fungal cx from bronch - repeat fungitell Fungitell: 204 (03-06-20 @ 15:10)    Acute hypoxemia, intubated 2/26, possible PE given +DVTs s/p Option Elite IVC filter via Right IJ access.  #Diarrhea   #1/16 Bronch MAC    given CT findings and +bronch specimen, would qualify for treatment per guidelines  #GIB, resolved  #Thrombocytopenia    continues post stopping linezolid   #Hx recent MRSA VAP    2/14 tracheal cx   Moderate Methicillin resistant Staphylococcus aureus    completed 8 day course, noted to have thrombocytopenia on linezolid  #Candidemia Fungitell: >500 (02-11-20 @ 11:21) likely CLABSI    2/11 BCX +, 2/12 BCX +, 2/13 BCX (-) RIJ 2/11. Groin a-line 2/4- removed 2/13     Fungitell: <31 (02-04-20 @ 04:40), Fungitell: 49 (02-01-20 @ 11:17)    Ophtho- no endophthalmitis   #Flu + AH1, Alveolar hemorrhage; bronch 1/20 negative for PCP, Fungal, etc, previous bronch cx with yeast (likely oral contaminant) since GMS neg    2/3 Bronch   No organisms seen, 2/3 cytopath Rare atypical cells, few ciliated bronchial cells, alveolar macrophages and inflammatory cells, mainly neutrophils.    Strep urine Ag neg, serum crypto Ag neg, CMV PCR undetectable, AFB neg    Quantiferon indeterminate    Lactate Dehydrogenase, Serum: 607 (02.03.20 @ 04:30)    Procalcitonin, Serum: 1.86:    CTA chest showing no PE but showing interval development of multifocal bilateral groundglass opacities as well as new moderate to severe bronchiectasis in the right lung,   1/13 CT b/l GGOs, compatible with intersitial edema      During last hospitalization: bronch cx bacterial NG, +yeast, no ID, neg legionella, + MRSA PCR    MRSA PCR Result.: Positive (02-01-20 @ 20:40)  #Severe sepsis on admission P>90, WBC 19, RR>20, lactic acidosis Lactate, Blood: 2.9 mmol/L (01-31-20 @ 09:25)    afebrile   #Elevated Alk ph, transaminitis  #LLE wound, not infection     12/7 WCX MSSA, Kleb, bacteroides    8/2019 MRSA in a wound   #Immunosuppressed: autoimmune hepatitis on prednisone and tacrolimus    RECOMMENDATIONS  - On Jorge 1g q8h IV  (abx 2/27-) 14 days is 3/11  - repeat fungitell still elevated, yeast in bronch, unclear what to make of this -- kinetics of serum BG are difficult to relate to clinical outcome. if hemodynamic compromise, consider adding back antifungal coverage with caspo  - +MAC 1/15 Bronch,  discuss starting therapy with Azithro 500mg daily and Ethambutol 1000mg daily (15 mg/kg), would hold off rifampin  with Pulm  - grave prognosis    Spectra [phone]

## 2020-03-10 NOTE — PROGRESS NOTE ADULT - SUBJECTIVE AND OBJECTIVE BOX
ELDER, DONI  75y  Female  HPI:  73y/o F w/ hx of asthma, COPD not on home O2, autoimmune hepatitis on prednisone and tacrolimus, heterozygous prothrombin gene mutation with hx of PE and DVT on coumadin with recent hospitalization in January 2020 with a diagnosis of pneumonia presents for worsening SOB, hemoptysis and cough. Everything started yesterday night when patient was in bed, started to feel shortness of breath and had many episodes of hemoptysis with clots, she also had substernal chest pain non radiating, moderate in intensity that worsens on coughing. She denies fever but endorses chills. She also admits for lightheadedness that occurred yesterday, no HA, abd pain, dysuria or paresthesias. She had 2 loose bowel movements since yesterday with some blood in the stools that she attributes to her hemorrhoids. She stopped Coumadin couple of days ago since her INR was supratherapeutic. She is from [facility] short term and also mentions that her  and another family member have the flu and she was exposed to them. She did not have the flu shot this season.  In ED, temp was 100.1 rectally, tachycardic ( sinus) , she was saturating to 70s on non rebreather and her SaO2 went up to 95%. ABG showing respiratory alkalosis initially and then corrected after BIPAP placement and correction of RR.  CTA chest showing no PE but showing interval development of multifocal bilateral groundglass opacities as well as new moderate to severe bronchiectasis in the right lung. Findings are concerning for an acute infectious/inflammatory process. (31 Jan 2020 14:36)    MEDICATIONS  (STANDING):  ALBUTerol    90 MICROgram(s) HFA Inhaler 4 Puff(s) Inhalation every 6 hours  calcitriol  Solution 0.25 MICROGram(s) Oral daily  calcium carbonate    500 mG (Tums) Chewable 3 Tablet(s) Chew every 8 hours  chlorhexidine 0.12% Liquid 15 milliLiter(s) Oral Mucosa two times a day  chlorhexidine 4% Liquid 1 Application(s) Topical daily  chlorhexidine 4% Liquid 1 Application(s) Topical <User Schedule>  cyanocobalamin 1000 MICROGram(s) Oral daily  dexMEDEtomidine Infusion 0.2 MICROgram(s)/kG/Hr (3.61 mL/Hr) IV Continuous <Continuous>  dextrose 5%. 1000 milliLiter(s) (50 mL/Hr) IV Continuous <Continuous>  dextrose 50% Injectable 12.5 Gram(s) IV Push once  dextrose 50% Injectable 25 Gram(s) IV Push once  dextrose 50% Injectable 25 Gram(s) IV Push once  fentaNYL   Infusion. 0.5 MICROgram(s)/kG/Hr (3.61 mL/Hr) IV Continuous <Continuous>  gabapentin 300 milliGRAM(s) Oral at bedtime  influenza   Vaccine 0.5 milliLiter(s) IntraMuscular once  insulin glargine Injectable (LANTUS) 45 Unit(s) SubCutaneous once  insulin lispro (HumaLOG) corrective regimen sliding scale   SubCutaneous three times a day before meals  insulin lispro Injectable (HumaLOG) 11 Unit(s) SubCutaneous every 6 hours  insulin regular Infusion 4 Unit(s)/Hr (4 mL/Hr) IV Continuous <Continuous>  ipratropium 17 MICROgram(s) HFA Inhaler 4 Puff(s) Inhalation every 6 hours  meropenem  IVPB      meropenem  IVPB 1000 milliGRAM(s) IV Intermittent every 8 hours  methylPREDNISolone sodium succinate Injectable 30 milliGRAM(s) IV Push daily  multivitamin/minerals 1 Tablet(s) Oral daily  mupirocin 2% Ointment 1 Application(s) Topical two times a day  norepinephrine Infusion 0.05 MICROgram(s)/kG/Min (3.384 mL/Hr) IV Continuous <Continuous>  pantoprazole   Suspension 40 milliGRAM(s) Oral before breakfast    MEDICATIONS  (PRN):  acetaminophen   Tablet .. 650 milliGRAM(s) Oral every 6 hours PRN Temp greater or equal to 38C (100.4F)  dextrose 40% Gel 15 Gram(s) Oral once PRN Blood Glucose LESS THAN 70 milliGRAM(s)/deciliter  glucagon  Injectable 1 milliGRAM(s) IntraMuscular once PRN Glucose LESS THAN 70 milligrams/deciliter    INTERVAL EVENTS: Patient seen today remains vented sedated and on pressors. FIO2 unchanged at 60%, PEEP reduced to 8    T(C): 36.4 (03-10-20 @ 16:00), Max: 36.8 (03-10-20 @ 12:00)  HR: 68 (03-10-20 @ 20:00) (68 - 114)  BP: 189/74 (03-10-20 @ 10:00) (189/74 - 189/79)  RR: 22 (03-10-20 @ 20:00) (18 - 45)  SpO2: 100% (03-10-20 @ 20:00) (84% - 100%)  Wt(kg): --Vital Signs Last 24 Hrs  T(C): 36.4 (10 Mar 2020 16:00), Max: 36.8 (10 Mar 2020 12:00)  T(F): 97.6 (10 Mar 2020 16:00), Max: 98.2 (10 Mar 2020 12:00)  HR: 68 (10 Mar 2020 20:00) (68 - 114)  BP: 189/74 (10 Mar 2020 10:00) (189/74 - 189/79)  BP(mean): 96 (10 Mar 2020 10:00) (96 - 128)  RR: 22 (10 Mar 2020 20:00) (18 - 45)  SpO2: 100% (10 Mar 2020 20:00) (84% - 100%)    PHYSICAL EXAM:  GENERAL: On vent, sedated   CHEST/LUNG: Transmittable sounds  HEART: S1, S2, Regular rate and rhythm  ABDOMEN: Soft, Nondistended, Bowel sounds present  EXTREMITIES: ++ edema    LABS:  Labs:                        7.4    5.61  )-----------( 84       ( 10 Mar 2020 04:05 )             22.9             03-10    129<L>  |  97<L>  |  47<H>  ----------------------------<  405<H>  4.9   |  23  |  0.5<L>    Ca    6.9<L>      10 Mar 2020 04:05  Mg     2.0     03-09    TPro  4.0<L>  /  Alb  1.5<L>  /  TBili  0.5  /  DBili  0.3<H>  /  AST  17  /  ALT  24  /  AlkPhos  310<H>  03-10    LIVER FUNCTIONS - ( 10 Mar 2020 04:05 )  Alb: 1.5 g/dL / Pro: 4.0 g/dL / ALK PHOS: 310 U/L / ALT: 24 U/L / AST: 17 U/L / GGT: x                       ABG - ( 10 Mar 2020 04:06 )  pH, Arterial: 7.38  pH, Blood: x     /  pCO2: 42    /  pO2: 168   / HCO3: 25    / Base Excess: -0.5  /  SaO2: 99            RADIOLOGY & ADDITIONAL TESTS:  < from: Xray Chest 1 View- PORTABLE-Routine (03.10.20 @ 05:03) >  INTERPRETATION:  Clinical History / Reason for exam: ICU    Comparison : Chest radiograph March 9, 2020.    Technique/Positioning: Adequate.    Findings:    Support devices: ETT with its tip above the baljinder, NGT with its tip below the diaphragm and right IJ line with its tip overlying the SVC.    Cardiac/mediastinum/hilum: Stable    Lung parenchyma/Pleura: Diffuse bilateral opacities as well as calcified pleural plaques, unchanged. No pneumothorax is seen.    Skeleton/soft tissues: Stable    Impression:      Diffuse bilateral opacities as well as calcified pleural plaques, unchanged.    Support tubes and lines as above.    < end of copied text >

## 2020-03-10 NOTE — CHART NOTE - NSCHARTNOTEFT_GEN_A_CORE
Patient currently on insulin drip, which cannot be continued in the vent unit. Received 35 units of insulin over the past 9 hours. Patient would require a total of 93 units of coverage over 24 hours. Will discontinue insulin drip. Start basal-bolus insulin with insulin glargine 45 units QD with insulin lispro 11 units Q6H with feeds. Add sliding scale coverage. Adjust as needed.

## 2020-03-10 NOTE — PROGRESS NOTE ADULT - ASSESSMENT
73 y/o female with pmhx of asthma, HTN,  autoimmune hepatitis, heterozygous prothrombin gene mutation with hx of PE and DVT on coumadin admitted for SOB     Acute hypoxic resp failure with hypoxia due to influenza, HCAP  Septic shock in immunocompromised patient, Severe sepsis present on admission   - completed course of treatment- antibiotics and antiviral completed for initial infection  - MRSA VAP treated with Zyvox for 10 days   - remains intubated, sedation  - Readmitted to the ICU with PE, HCAP pneumonia, GNR on BAL : Klebsiella and rare yeast  - Septic - remains on pressors  - GFF placed due to PE   - remains on IV Solu-medrol  - ID f/u noted, on Meropenem, with concern for yeast - ? to restart Casp, ? MAC start treatment  as per recommendations as last resort  - vented, FIO2 at 60%, PEEP 8    Multiple Skin tears/ wounds  - local care  - elevate extremities    Candedemia  - treated  - cultures negative x2  - 2D echo no evidence of vegetation   - ophth evaluation noted, no clinical evidence, low suspicion for ocular fungemia/endogenous endophthalmitis   - completed treatment  2/27    REJI on CKD 3  - creatinine normal  - urine output noted  - continue to monitor    Hyponatremia   - slight improvement  - repeat labs    Hypokalemia  - supplemented    Acute Blood Loss Anemia  - had rectal tube earlier on this adm which was discontinued due to ulcer  - on Protonix  - has had multiple PRBC's this adm  - no further evidence of rectal bleeding  - H/H noted, Hem/Onc f/u noted    Thrombocytopenia  - drug related  - no evidence of bleeding  - continue to monitor    Autoimmune hepatitis  - On prednisone 60 mg PO qd and Tacrolimus at home  - now on Solu-medrol tapered    - Tacrolimus held    HTN by hx  - off Labetolol    Heterozygous prothrombin gene mutation with hx of PE and DVT on coumadin:  - Coumadin had been held for alveolar hemorrhage     Protein Malnutrition  - Alb noted, trending back up  - tolerating enteral feedings  - monitor    Diet: Enteral feedings   GI PPx: Protonix  DVT PPx: sequentials  Activity: bedrest  Dispo: readmitted to hospital from  rehab at [facility], remains ICU      Continue supportive measures, patient remains acutely ill,  Overall prognosis extremely poor.   states does not want a trach. He is aware of his wife's wishes.

## 2020-03-10 NOTE — PROGRESS NOTE ADULT - SUBJECTIVE AND OBJECTIVE BOX
Patient is a 75y old  Female who presents with a chief complaint of SOB and desaturation (10 Mar 2020 09:02)        Over Night Events:Remains critically ill on MV, On precedex Fentanyl ; On levophed         ROS:     CONSTITUTIONAL:   no fever   no chills.  no weight gain   no weight loss    EYES:   no discharge,   no pain  no redness,   no visual changes.    ENT:   Ears: no ear pain and no hearing problems.  Nose: no nasal congestion and no nasal drainage.  Mouth/Throat: no dysphagia,  no hoarseness and no throat pain.  Neck: no lumps, no pain, no stiffness and no swollen glands.     CARDIOVASCULAR:   as per HPI     RESPIRATORY:  as per HPI     GASTROINTESTINAL:   no abdominal pain,   no constipation,   no diarrhea,   no vomiting.    GENITOURINARY:  no dysuria,   no frequency,   no urgency  no hematuria.    MUSCULOSKELETAL:   no back pain,   no musculoskeletal pain,  no weakness.    SKIN:   no jaundice,   no lesions,   no pruritis,   no rashes.    NEURO:   as per HPI     PSYCHIATRIC:   no known mental health issues  no anxiety  no depression    ALLERGIC/IMMUNOLOGIC:   No active allergic or immunologic issues        PHYSICAL EXAM    ICU Vital Signs Last 24 Hrs  T(C): 36.6 (10 Mar 2020 08:00), Max: 36.7 (09 Mar 2020 20:00)  T(F): 97.9 (10 Mar 2020 08:00), Max: 98 (09 Mar 2020 20:00)  HR: 108 (10 Mar 2020 09:15) (76 - 118)  BP: 189/79 (10 Mar 2020 08:30) (189/79 - 189/79)  BP(mean): 128 (10 Mar 2020 08:30) (128 - 128)  ABP: 138/56 (10 Mar 2020 09:15) (94/42 - 204/76)  ABP(mean): 82 (10 Mar 2020 09:15) (56 - 124)  RR: 23 (10 Mar 2020 09:15) (19 - 45)  SpO2: 95% (10 Mar 2020 09:15) (84% - 100%)      CONSTITUTIONAL:   Ill appearing.   NAD    ENT:   ET+  Airway patent,   Mouth with normal mucosa.   No thrush    EYES:   Pupils equal,   Round and reactive to light.    CARDIAC:   Normal rate,   Regular rhythm.    No edema      Vascular:  Normal systolic impulse  No Carotid bruits    RESPIRATORY:   No wheezing  Bilateral BS  Normal chest expansion  Not tachypneic,  No use of accessory muscles    GASTROINTESTINAL:  Abdomen soft,   Non-tender,   No guarding,   + BS    GENITOURINARY  normal genitalia for sex  no edema    MUSCULOSKELETAL:   Range of motion is not limited,  No muscle or joint tenderness  No clubbing, cyanosis    NEUROLOGICAL:   does not follow commands  Non focal      SKIN:   Skin normal color for race,   Warm and dry and intact.   No evidence of rash.        HEME LYMPH:   No cervical  lymphadenopathy.  no inguinal lymphadenopathy      03-09-20 @ 07:01  -  03-10-20 @ 07:00  --------------------------------------------------------  IN:    dexmedetomidine Infusion: 400.4 mL    dexmedetomidine Infusion: 94 mL    fentaNYL Infusion.: 343.1 mL    fentaNYL Infusion.: 332.2 mL    Free Water: 1000 mL    IV PiggyBack: 100 mL    norepinephrine Infusion: 30 mL    Osmolite: 960 mL  Total IN: 3259.7 mL    OUT:    Indwelling Catheter - Urethral: 982 mL  Total OUT: 982 mL    Total NET: 2277.7 mL      03-10-20 @ 07:01  -  03-10-20 @ 09:43  --------------------------------------------------------  IN:    dexmedetomidine Infusion: 81.3 mL    fentaNYL Infusion.: 86.7 mL    insulin regular Infusion: 10 mL    norepinephrine Infusion: 1.4 mL  Total IN: 179.4 mL    OUT:    Indwelling Catheter - Urethral: 190 mL  Total OUT: 190 mL    Total NET: -10.6 mL          LABS:                            7.4    5.61  )-----------( 84       ( 10 Mar 2020 04:05 )             22.9                                               03-10    129<L>  |  97<L>  |  47<H>  ----------------------------<  405<H>  4.9   |  23  |  0.5<L>    Ca    6.9<L>      10 Mar 2020 04:05  Mg     2.0     03-09    TPro  4.0<L>  /  Alb  1.5<L>  /  TBili  0.5  /  DBili  0.3<H>  /  AST  17  /  ALT  24  /  AlkPhos  310<H>  03-10                                                                                           LIVER FUNCTIONS - ( 10 Mar 2020 04:05 )  Alb: 1.5 g/dL / Pro: 4.0 g/dL / ALK PHOS: 310 U/L / ALT: 24 U/L / AST: 17 U/L / GGT: x                                                                                               Mode: AC/ CMV (Assist Control/ Continuous Mandatory Ventilation)  RR (machine): 20  TV (machine): 500  FiO2: 70  PEEP: 8  ITime: 1  MAP: 12  PIP: 30                                      ABG - ( 10 Mar 2020 04:06 )  pH, Arterial: 7.38  pH, Blood: x     /  pCO2: 42    /  pO2: 168   / HCO3: 25    / Base Excess: -0.5  /  SaO2: 99                  MEDICATIONS  (STANDING):  ALBUTerol    90 MICROgram(s) HFA Inhaler 4 Puff(s) Inhalation every 6 hours  calcitriol  Solution 0.25 MICROGram(s) Oral daily  calcium carbonate    500 mG (Tums) Chewable 3 Tablet(s) Chew every 8 hours  chlorhexidine 0.12% Liquid 15 milliLiter(s) Oral Mucosa two times a day  chlorhexidine 4% Liquid 1 Application(s) Topical daily  chlorhexidine 4% Liquid 1 Application(s) Topical <User Schedule>  cyanocobalamin 1000 MICROGram(s) Oral daily  dexMEDEtomidine Infusion 0.2 MICROgram(s)/kG/Hr (3.61 mL/Hr) IV Continuous <Continuous>  dextrose 5%. 1000 milliLiter(s) (50 mL/Hr) IV Continuous <Continuous>  dextrose 50% Injectable 12.5 Gram(s) IV Push once  dextrose 50% Injectable 25 Gram(s) IV Push once  dextrose 50% Injectable 25 Gram(s) IV Push once  fentaNYL   Infusion. 0.5 MICROgram(s)/kG/Hr (3.61 mL/Hr) IV Continuous <Continuous>  gabapentin 300 milliGRAM(s) Oral at bedtime  influenza   Vaccine 0.5 milliLiter(s) IntraMuscular once  insulin regular Infusion 4 Unit(s)/Hr (4 mL/Hr) IV Continuous <Continuous>  ipratropium 17 MICROgram(s) HFA Inhaler 4 Puff(s) Inhalation every 6 hours  lactated ringers Bolus 500 milliLiter(s) IV Bolus once  meropenem  IVPB      meropenem  IVPB 1000 milliGRAM(s) IV Intermittent every 8 hours  methylPREDNISolone sodium succinate Injectable 30 milliGRAM(s) IV Push daily  multivitamin/minerals 1 Tablet(s) Oral daily  mupirocin 2% Ointment 1 Application(s) Topical two times a day  norepinephrine Infusion 0.05 MICROgram(s)/kG/Min (3.384 mL/Hr) IV Continuous <Continuous>  pantoprazole   Suspension 40 milliGRAM(s) Oral before breakfast    MEDICATIONS  (PRN):  acetaminophen   Tablet .. 650 milliGRAM(s) Oral every 6 hours PRN Temp greater or equal to 38C (100.4F)  dextrose 40% Gel 15 Gram(s) Oral once PRN Blood Glucose LESS THAN 70 milliGRAM(s)/deciliter  glucagon  Injectable 1 milliGRAM(s) IntraMuscular once PRN Glucose LESS THAN 70 milligrams/deciliter                                                                                     ECHO  < from: Transthoracic Echocardiogram (02.26.20 @ 16:09) >   1. LV Ejection Fraction by Caballero's Method with a biplane EF of 70 %.   2. Moderate concentric left ventricular hypertrophy.   3. Spectral Doppler shows impaired relaxation pattern of left ventricular myocardial filling (Grade I diastolic dysfunction).   4. Mild mitral valve regurgitation.   5. Sclerotic aortic valve with normal opening.   6. Estimated pulmonary artery systolic pressure is 36.2 mmHg assuming a right atrial pressure of 5 mmHg, which is consistent with borderline pulmonary hypertension.    < end of copied text >      CXR interpreted by me:   ET ok ; TLC Ok, OG ok

## 2020-03-10 NOTE — PROGRESS NOTE ADULT - ASSESSMENT
IMPRESSION:    Acute hypoxic respiratory failure likely VTE SP GFF   Klebsiella in BAL treated   DAH resolving  Candidemia SP therapy   HO Autoimmune hepatitis on tacrolimus and solumedrol  Possible recurrent septic shock secondary to pneumonia   H/O MAC in BAL 1/15 ; Fungitell +    PLAN:    CNS:  SAT     HEENT: ET care.  Oral care.      PULMONARY:  HOB @ 45 degrees. Continue Solumedrol 30 mg daily. VENT Changes: dec fio2  .  Repeat ABG     CARDIOVASCULAR:  I=O.     GI: GI prophylaxis.  Feeding OG feeding    RENAL:  Follow up lytes. Replete as needed.     INFECTIOUS DISEASE: Follow up cultures.  Abx as per ID     HEMATOLOGICAL:  DVT prophylaxis.  FU CBC and coags     ENDOCRINE:  Follow up FS.  Insulin protocol if needed    MUSCULOSKELETAL: Bed rest     Prognosis: Very Poor prognosis overall     MICU monitoring IMPRESSION:    Acute hypoxic respiratory failure likely VTE SP GFF   Klebsiella in BAL treated   DAH resolving  Candidemia SP therapy   HO Autoimmune hepatitis on tacrolimus and solumedrol  Possible recurrent septic shock secondary to pneumonia   H/O MAC in BAL 1/15 ; Fungitell +    PLAN:    CNS:  SAT     HEENT: ET care.  Oral care.      PULMONARY:  HOB @ 45 degrees. Continue Solumedrol 30 mg daily. VENT Changes: dec fio2  .  Repeat ABG     CARDIOVASCULAR:  I=O.     GI: GI prophylaxis.  Feeding OG feeding    RENAL:  Follow up lytes. Replete as needed.     INFECTIOUS DISEASE: Follow up cultures.  Abx as per ID     HEMATOLOGICAL:  DVT prophylaxis.  FU CBC and coags     ENDOCRINE:  Follow up FS.  Insulin protocol if needed    MUSCULOSKELETAL: Bed rest     Prognosis: Very Poor prognosis overall     Awaiting family's decision regarding trach VS liberation     Transfer to vent Unit IMPRESSION:    Acute hypoxic respiratory failure likely VTE SP GFF   Klebsiella in BAL treated   DAH resolving  Candidemia SP therapy   HO Autoimmune hepatitis on tacrolimus and solumedrol  Possible recurrent septic shock secondary to pneumonia       PLAN:    CNS:  SAT     HEENT: ET care.  Oral care.      PULMONARY:  HOB @ 45 degrees. Continue Solumedrol 30 mg daily. VENT Changes: dec fio2  .  Repeat ABG     CARDIOVASCULAR:  I=O.     GI: GI prophylaxis.  Feeding OG feeding    RENAL:  Follow up lytes. Replete as needed.     INFECTIOUS DISEASE: Follow up cultures.  Abx as per ID     HEMATOLOGICAL:  DVT prophylaxis.  FU CBC and coags     ENDOCRINE:  Follow up FS.  Insulin protocol if needed    MUSCULOSKELETAL: Bed rest     Prognosis: Very Poor prognosis overall     Awaiting family's decision regarding trach VS liberation     Transfer to vent Unit

## 2020-03-10 NOTE — PROGRESS NOTE ADULT - SUBJECTIVE AND OBJECTIVE BOX
PATIENT:  DONI PATRICK  [id number]    CHIEF COMPLAINT:  Patient is a 75y old  Female who presents with a chief complaint of SOB and desaturation (10 Mar 2020 10:19)      INTERVAL HISTORYOVERNIGHT EVENTS:  No events overnight.        MEDICATIONS:  MEDICATIONS  (STANDING):  ALBUTerol    90 MICROgram(s) HFA Inhaler 4 Puff(s) Inhalation every 6 hours  azithromycin   Tablet 500 milliGRAM(s) Oral daily  calcitriol  Solution 0.25 MICROGram(s) Oral daily  calcium carbonate    500 mG (Tums) Chewable 3 Tablet(s) Chew every 8 hours  chlorhexidine 0.12% Liquid 15 milliLiter(s) Oral Mucosa two times a day  chlorhexidine 4% Liquid 1 Application(s) Topical daily  chlorhexidine 4% Liquid 1 Application(s) Topical <User Schedule>  cyanocobalamin 1000 MICROGram(s) Oral daily  dexMEDEtomidine Infusion 0.2 MICROgram(s)/kG/Hr (3.61 mL/Hr) IV Continuous <Continuous>  dextrose 5%. 1000 milliLiter(s) (50 mL/Hr) IV Continuous <Continuous>  dextrose 50% Injectable 12.5 Gram(s) IV Push once  dextrose 50% Injectable 25 Gram(s) IV Push once  dextrose 50% Injectable 25 Gram(s) IV Push once  ethambutol 1000 milliGRAM(s) Oral daily  fentaNYL   Infusion. 0.5 MICROgram(s)/kG/Hr (3.61 mL/Hr) IV Continuous <Continuous>  gabapentin 300 milliGRAM(s) Oral at bedtime  influenza   Vaccine 0.5 milliLiter(s) IntraMuscular once  insulin regular Infusion 4 Unit(s)/Hr (4 mL/Hr) IV Continuous <Continuous>  ipratropium 17 MICROgram(s) HFA Inhaler 4 Puff(s) Inhalation every 6 hours  meropenem  IVPB      meropenem  IVPB 1000 milliGRAM(s) IV Intermittent every 8 hours  methylPREDNISolone sodium succinate Injectable 30 milliGRAM(s) IV Push daily  multivitamin/minerals 1 Tablet(s) Oral daily  mupirocin 2% Ointment 1 Application(s) Topical two times a day  norepinephrine Infusion 0.05 MICROgram(s)/kG/Min (3.384 mL/Hr) IV Continuous <Continuous>  pantoprazole   Suspension 40 milliGRAM(s) Oral before breakfast  rifAMPin 600 milliGRAM(s) Oral daily    MEDICATIONS  (PRN):  acetaminophen   Tablet .. 650 milliGRAM(s) Oral every 6 hours PRN Temp greater or equal to 38C (100.4F)  dextrose 40% Gel 15 Gram(s) Oral once PRN Blood Glucose LESS THAN 70 milliGRAM(s)/deciliter  glucagon  Injectable 1 milliGRAM(s) IntraMuscular once PRN Glucose LESS THAN 70 milligrams/deciliter      ALLERGIES:  Allergies    No Known Allergies    Intolerances        OBJECTIVE:  ICU Vital Signs Last 24 Hrs  T(C): 36.6 (10 Mar 2020 08:00), Max: 36.7 (09 Mar 2020 20:00)  T(F): 97.9 (10 Mar 2020 08:00), Max: 98 (09 Mar 2020 20:00)  HR: 83 (10 Mar 2020 09:52) (76 - 118)  BP: 189/79 (10 Mar 2020 08:30) (189/79 - 189/79)  BP(mean): 128 (10 Mar 2020 08:30) (128 - 128)  ABP: 138/56 (10 Mar 2020 09:15) (94/42 - 204/76)  ABP(mean): 82 (10 Mar 2020 09:15) (56 - 124)  RR: 23 (10 Mar 2020 09:15) (19 - 45)  SpO2: 94% (10 Mar 2020 09:52) (84% - 100%)    Mode: AC/ CMV (Assist Control/ Continuous Mandatory Ventilation)  RR (machine): 20  TV (machine): 500  FiO2: 60  PEEP: 8  ITime: 1  MAP: 13  PIP: 41    Adult Advanced Hemodynamics Last 24 Hrs  CVP(mm Hg): --  CVP(cm H2O): --  CO: --  CI: --  PA: --  PA(mean): --  PCWP: --  SVR: --  SVRI: --  PVR: --  PVRI: --  CAPILLARY BLOOD GLUCOSE      POCT Blood Glucose.: 185 mg/dL (10 Mar 2020 11:13)  POCT Blood Glucose.: 225 mg/dL (10 Mar 2020 10:24)  POCT Blood Glucose.: 302 mg/dL (10 Mar 2020 09:13)  POCT Blood Glucose.: 440 mg/dL (10 Mar 2020 06:48)  POCT Blood Glucose.: 427 mg/dL (10 Mar 2020 06:08)  POCT Blood Glucose.: 301 mg/dL (09 Mar 2020 18:04)  POCT Blood Glucose.: 230 mg/dL (09 Mar 2020 12:26)    CAPILLARY BLOOD GLUCOSE      POCT Blood Glucose.: 185 mg/dL (10 Mar 2020 11:13)    I&O's Summary    09 Mar 2020 07:01  -  10 Mar 2020 07:00  --------------------------------------------------------  IN: 3259.7 mL / OUT: 982 mL / NET: 2277.7 mL    10 Mar 2020 07:01  -  10 Mar 2020 11:23  --------------------------------------------------------  IN: 179.4 mL / OUT: 190 mL / NET: -10.6 mL      Daily     Daily Weight in k (10 Mar 2020 05:00)    PHYSICAL EXAMINATION:  General: WN/WD NAD  HEENT: EOMI  Neurology: sedated  Respiratory: b/l breath sounds  CV: RRR, S1S2, no murmurs, rubs or gallops  Abdominal: Soft, ND  Extremities: edema, + peripheral pulses  Incisions:   Tubes: intubated    LABS:  ABG - ( 10 Mar 2020 04:06 )  pH, Arterial: 7.38  pH, Blood: x     /  pCO2: 42    /  pO2: 168   / HCO3: 25    / Base Excess: -0.5  /  SaO2: 99                                      7.4    5.61  )-----------( 84       ( 10 Mar 2020 04:05 )             22.9     03-10    129<L>  |  97<L>  |  47<H>  ----------------------------<  405<H>  4.9   |  23  |  0.5<L>    Ca    6.9<L>      10 Mar 2020 04:05  Mg     2.0     03-09    TPro  4.0<L>  /  Alb  1.5<L>  /  TBili  0.5  /  DBili  0.3<H>  /  AST  17  /  ALT  24  /  AlkPhos  310<H>  03-10    LIVER FUNCTIONS - ( 10 Mar 2020 04:05 )  Alb: 1.5 g/dL / Pro: 4.0 g/dL / ALK PHOS: 310 U/L / ALT: 24 U/L / AST: 17 U/L / GGT: x                       TELEMETRY:     EKG:     IMAGING:

## 2020-03-10 NOTE — CHART NOTE - NSCHARTNOTEFT_GEN_A_CORE
74y female with PMH of asthma, COPD not on home O2, autoimmune hepatitis on prednisone and tacrolimus, heterozygous prothrombin gene mutation with hx of PE and DVT on coumadin presents to the hospital complaining of cough, hemoptysis. Found to have viral pneumonia with respiratory failure, diffuse alveolar hemorrhage and intubated. Extubated after prolonged course but required re-intubation 2/26 for worsening respiratory status and received IVC filter 2/26 because pt not safe for AC (alveolar hemorrhage). Pt continues to be intubated and on/off pressors, unable to be weaned off. Pt's family will decide between trach/peg and trial of extubation in vent unit.    To do  - continue treatment for MAC  - repeat fungitell, f/u final bronchial cultures  - family will decide trach/peg vs extubation trial      #) Diffuse Alveoloar hemorrhage  - completed Zyvox, Levaquin, Cefepime course, and oseltamivir   - C/w Duoneb   - Solumedrol IV 40mg q24hr  - Bronch results - neg culture, neg fungal, cytology positive for inflammatory cells, no organisms  - Bronch 2/16 showed bleeding from DA  - Bronch 2/26 for BAL, cultures show gram - rods, start meropenem 2/27    #) Opacities Lung  - wbc increased from 4.03 (3/4) to 9.81 (3/5)  - bronchoscopy 3/5 for new right upper opacity on cxr, bronchial aspirate sent, f/u cultures  - start vancomycin, continue meropenem, f/u vanc levels before 30 mins before 4th dose (3/6 at 5:30 PM)  - 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  - started on nimbex drip 3/6 for persistent sedation and agitation on ventilator  - pending bronchial cultures from bronchoscopy 3/5  - start azithro 500 mg qd, ethambutol 1000 mg for MAC  - repeat fungitell f/u    #) Candidemia, secondary to central line  - cultures neg since 2/14  - oral fluconazole 14 days, switched to caspofungin for rising LFT (2/26), completed 2/27  - ID following  - repeat fungitell 3/4, continue caspofungin    #) Rectal Bleeding   - GI following   - received 1U of blood 2/23, hemoglobin stable  - Protonix 40mg PO qd   - FlexSig showed 2 rectal ulcers one clean one crater, s/p clipping  - CBC q24  - Avoid Dignshield or rectal tube   - s/p sigmoidoscopy recurrent rectal bleeding possibly from diverticulosis 2/28, received 2U pRBC,   - will need CT angio if bleeding again, f/u CBC    #) 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  - duplex shows DVT consistent with prior  - not candidate for AC at this time due to alveolar hemorrhage   - IVC filter placed 2/26    #Immunosuppressed: Autoimmune Hepatitis   - solumedrol 60 mg qd  - CMV PCR neg this admission     #Heterozygous prothrombin gene mutation with hx of PE and DVT on coumadin  - holding coumadin for now due to alveolar hemorrhage   - monitor coags    #0.5 cm of GB polyp  - repeat US abdomen in 6 months per GI    #Deconditioning   - pt severely deconditioned due to prolonged hospital stay  - PT/Rehab     #) MISC  Activity: intubated  DVT ppx: IVC filter  GI ppx: protonix  Diet: tube feeding  Code: Full  Dispo: medical optimization  CHG

## 2020-03-10 NOTE — PROGRESS NOTE ADULT - MINUTES
35
40
45
25
45
35

## 2020-03-10 NOTE — PROGRESS NOTE ADULT - SUBJECTIVE AND OBJECTIVE BOX
DARNELL, DONI  75y, Female  Allergy: No Known Allergies      LOS  39d    CHIEF COMPLAINT: SOB and desaturation (10 Mar 2020 07:10)      INTERVAL EVENTS/HPI  - No acute events overnight  - T(F): , Max: 98 (03-09-20 @ 20:00)  - WBC Count: 5.61 (03-10-20 @ 04:05)  WBC Count: 6.99 (03-09-20 @ 04:30)  - Creatinine, Serum: 0.5 (03-10-20 @ 04:05)  Creatinine, Serum: <0.5 (03-09-20 @ 04:30)       ROS  unable to obtain history secondary to patient's mental status and/or sedation     VITALS:  T(F): 97.8, Max: 98 (03-09-20 @ 20:00)  HR: 78  BP: --  RR: 32Vital Signs Last 24 Hrs  T(C): 36.6 (10 Mar 2020 00:00), Max: 36.7 (09 Mar 2020 20:00)  T(F): 97.8 (10 Mar 2020 00:00), Max: 98 (09 Mar 2020 20:00)  HR: 78 (10 Mar 2020 07:00) (76 - 118)  BP: --  BP(mean): --  RR: 32 (10 Mar 2020 07:00) (19 - 45)  SpO2: 98% (10 Mar 2020 07:00) (84% - 100%)      PHYSICAL EXAM:  Gen: chronically ill appearing intubated  HEENT: Normocephalic, atraumatic  Neck: supple, no lymphadenopathy  CV: Regular rate & regular rhythm  Lungs: decreased BS at bases, no fremitus  Abdomen: Soft, BS present  Ext: Warm, well perfused, anasarca  Neuro: sedated  Skin: no rash, no erythema, multiple ecchymoses  Lines: no phlebitis, IJ      FH: Non-contributory  Social Hx: Non-contributory    TESTS & MEASUREMENTS:                        7.4    5.61  )-----------( 84       ( 10 Mar 2020 04:05 )             22.9     03-10    129<L>  |  97<L>  |  47<H>  ----------------------------<  405<H>  4.9   |  23  |  0.5<L>    Ca    6.9<L>      10 Mar 2020 04:05  Mg     2.0     03-09    TPro  4.0<L>  /  Alb  1.5<L>  /  TBili  0.5  /  DBili  0.3<H>  /  AST  17  /  ALT  24  /  AlkPhos  310<H>  03-10    eGFR if Non African American: 95 mL/min/1.73M2 (03-10-20 @ 04:05)  eGFR if African American: 110 mL/min/1.73M2 (03-10-20 @ 04:05)    LIVER FUNCTIONS - ( 10 Mar 2020 04:05 )  Alb: 1.5 g/dL / Pro: 4.0 g/dL / ALK PHOS: 310 U/L / ALT: 24 U/L / AST: 17 U/L / GGT: x               Culture - Fungal, Bronchial (collected 03-05-20 @ 10:34)  Source: .Bronchial None  Preliminary Report (03-09-20 @ 09:08):    Rare Yeast    Culture - Acid Fast - Bronchial w/Smear (collected 03-05-20 @ 10:34)  Source: .Bronchial None  Preliminary Report (03-07-20 @ 15:04):    Culture is being performed.    Culture - Bronchial (collected 03-05-20 @ 10:34)  Source: .Bronchial None  Gram Stain (03-06-20 @ 06:31):    Few polymorphonuclear leukocytes seen per low power field    Rare Squamous epithelial cells seen per low power field    Numerous Gram Variable Rods seen per oil power field    Few Gram positive cocci in pairs seen per oil power field  Final Report (03-07-20 @ 15:53):    Normal Respiratory Nikki present    Culture - Acid Fast - Bronchial w/Smear (collected 02-26-20 @ 14:45)  Source: .Bronchial None  Preliminary Report (03-07-20 @ 15:03):    No growth at 1 week.    Culture - Fungal, Bronchial (collected 02-26-20 @ 14:45)  Source: .Bronchial None  Preliminary Report (03-02-20 @ 08:54):    Rare Yeast    Culture - Bronchial (collected 02-26-20 @ 14:45)  Source: .Bronchial None  Gram Stain (02-27-20 @ 07:36):    Moderate polymorphonuclear leukocytes seen per low power field    Rare Squamous epithelial cells seen per low power field    Numerous Gram Negative Rods seen per oil power field  Final Report (02-28-20 @ 21:13):    Numerous Klebsiella pneumoniae    Normal Respiratory Nikki present  Organism: Klebsiella pneumoniae (02-28-20 @ 21:13)  Organism: Klebsiella pneumoniae (02-28-20 @ 21:13)      -  Amikacin: S <=16      -  Amoxicillin/Clavulanic Acid: S <=8/4      -  Ampicillin: R >16 These ampicillin results predict results for amoxicillin      -  Ampicillin/Sulbactam: S 8/4 Enterobacter, Citrobacter, and Serratia may develop resistance during prolonged therapy (3-4 days)      -  Aztreonam: S <=4      -  Cefazolin: S <=2 Enterobacter, Citrobacter, and Serratia may develop resistance during prolonged therapy (3-4 days)      -  Cefepime: S <=2      -  Cefoxitin: S <=8      -  Ceftriaxone: S <=1 Enterobacter, Citrobacter, and Serratia may develop resistance during prolonged therapy      -  Ciprofloxacin: S <=1      -  Ertapenem: S <=0.5      -  Gentamicin: S <=2      -  Imipenem: S <=1      -  Levofloxacin: S <=2      -  Meropenem: S <=1      -  Piperacillin/Tazobactam: S <=8      -  Tobramycin: S <=2      -  Trimethoprim/Sulfamethoxazole: S <=2/38      Method Type: EROS    Culture - Blood (collected 02-17-20 @ 05:17)  Source: .Blood None  Final Report (02-22-20 @ 14:00):    No growth at 5 days.    Culture - Blood (collected 02-15-20 @ 04:57)  Source: .Blood None  Final Report (02-20-20 @ 18:01):    No growth at 5 days.    Culture - Sputum (collected 02-14-20 @ 17:00)  Source: .Sputum Sputum  Gram Stain (02-15-20 @ 04:56):    Few Squamous epithelial cells per low power field    Few polymorphonuclear leukocytes per low power field    Few Yeast like cells per oil power field    Few Gram variable coccobacilli per oil power field  Final Report (02-16-20 @ 17:44):    Moderate Methicillin resistant Staphylococcus aureus    Normal Respiratory Nikki present  Organism: Methicillin resistant Staphylococcus aureus (02-16-20 @ 17:44)  Organism: Methicillin resistant Staphylococcus aureus (02-16-20 @ 17:44)      -  Ampicillin/Sulbactam: R <=8/4      -  Cefazolin: R <=4      -  Clindamycin: S <=0.25      -  Erythromycin: R >4      -  Gentamicin: S <=1 Should not be used as monotherapy      -  Linezolid: S 2      -  Oxacillin: R >2      -  Penicillin: R >8      -  RIF- Rifampin: S <=1 Should not be used as monotherapy      -  Tetra/Doxy: S <=1      -  Trimethoprim/Sulfamethoxazole: S <=0.5/9.5      -  Vancomycin: S 2      Method Type: EROS    Culture - Blood (collected 02-14-20 @ 04:30)  Source: .Blood Blood-Peripheral  Final Report (02-19-20 @ 14:00):    No growth at 5 days.    Culture - Other (collected 02-13-20 @ 18:55)  Source: .Other wound  Final Report (02-15-20 @ 19:22):    No growth    Culture - Blood (collected 02-12-20 @ 04:50)  Source: .Blood None  Gram Stain (02-13-20 @ 14:24):    Growth in aerobic bottle: Yeast like cells  Final Report (02-18-20 @ 15:03):    Growth in aerobic bottle: Candida albicans    See previous culture [id number]    Culture - Blood (collected 02-11-20 @ 11:21)  Source: .Blood None  Gram Stain (02-12-20 @ 16:48):    Growth in aerobic bottle: Yeast like cells  Final Report (02-15-20 @ 14:05):    Growth in aerobic bottle: Candida albicans    ***Blood Panel PCR results on this specimen are available    approximately 3 hours after the Gram stain result.***    Gram stain, PCR, and/or culture results may not always    correspond due to difference in methodologies.    ************************************************************    This PCR assay was performed using [employer].    The following targets are tested for: Enterococcus,    vancomycin resistant enterococci, Listeria monocytogenes,    coagulase negative staphylococci, S. aureus,    methicillin resistant S. aureus, Streptococcus agalactiae    (Group B), S. pneumoniae, S. pyogenes (Group A),    Acinetobacter baumannii, Enterobacter cloacae, E. coli,    Klebsiella oxytoca, K. pneumoniae, Proteus sp.,    Serratia marcescens, Haemophilus influenzae,    Neisseria meningitidis, Pseudomonas aeruginosa, Candida    albicans, C. glabrata, C krusei, C parapsilosis,    C. tropicalis and the KPC resistance gene.  Organism: Blood Culture PCR  Candida albicans (02-15-20 @ 14:05)  Organism: Candida albicans (02-15-20 @ 14:05)      -  Fluconazole: S 0.25 Fluconazole = Data established for mucosal disease, and is generally applicable for invasive disease.  Susceptibility is dependent on achieving the maximal possible blood level.  Doses of 400 MG/day or more may be required in adults with normal renalfunction and body habitus.      -  Interpretation: See note This test method is not approved by the FDA and is for research use only.  The performance characteristics of this assay may have been determined by [employer] and [facility], [facility].                            N/I - No interpretation available                                                         SDD – Sensitive Dose Dependent      Method Type: YSTMIC  Organism: Blood Culture PCR (02-15-20 @ 14:05)      -  Candida albicans: Detec      Method Type: PCR            INFECTIOUS DISEASES TESTING  Fungitell: 204 (03-06-20 @ 15:10)  Fungitell: >500 (02-11-20 @ 11:21)  Fungitell: <31 (02-04-20 @ 04:40)  Streptococcus Pneumoniae Ag Urine: Negative (02-02-20 @ 11:00)  MRSA PCR Result.: Positive (02-01-20 @ 20:40)  Procalcitonin, Serum: 1.86 (02-01-20 @ 20:30)  Procalcitonin, Serum: 1.35 (02-01-20 @ 11:17)  Fungitell: 49 (02-01-20 @ 11:17)  Procalcitonin, Serum: 0.77 (02-01-20 @ 04:46)  Rapid RVP Result: Detected (01-31-20 @ 16:24)  Legionella Antigen, Urine: Negative (01-31-20 @ 15:36)  Streptococcus Pneumoniae Ag Urine: Negative (01-31-20 @ 15:36)  Legionella Antigen, Urine: Negative (01-20-20 @ 02:50)  Procalcitonin, Serum: 0.05 (01-16-20 @ 11:30)  MRSA PCR Result.: Positive (01-14-20 @ 13:59)  Flu A Result: Negative (01-13-20 @ 13:10)  Flu B Result: Negative (01-13-20 @ 13:10)  RSV Result: Negative (01-13-20 @ 13:10)  MRSA PCR Result.: Negative (08-13-19 @ 08:46)      RADIOLOGY & ADDITIONAL TESTS:  I have personally reviewed the last available Chest xray  CXR      CT      CARDIOLOGY TESTING  Transthoracic Echocardiogram:    EXAM:  2-D ECHO (TTE) COMPLETE        PROCEDURE DATE:  02/26/2020      INTERPRETATION:  REPORT:    TRANSTHORACIC ECHOCARDIOGRAM REPORT         Patient Name:   DONI PATRICK Accession #: [id number]  Medical Rec #:  [id number]     Height:      64.0 in 162.6 cm  YOB: 1945    Weight:      181.0 lb 82.10 kg  Patient Age:    75 years     BSA:         1.87 m²  Patient Gender: F            BP:          106/63 mmHg       Date of Exam:        2/26/2020 4:09:11 PM  Referring Physician: [id number] [facility]  Sonographer:         Peter Peterson  Reading Physician:   lAexander Alex M.D.    Procedure:   2D Echo/Doppler/Color Doppler Complete.  Indications: I26.99 - Other Pulmonary Embolism without Acute Cor Pulmonale  Diagnosis:   Other pulmonary embolism without acute cor pulmonale - I26.99         Summary:   1. LV Ejection Fraction by Caballero's Method with a biplane EF of 70 %.   2. Moderate concentric left ventricular hypertrophy.   3. Spectral Doppler shows impaired relaxation pattern of left ventricular myocardial filling (Grade I diastolic dysfunction).   4. Mild mitral valve regurgitation.   5. Sclerotic aortic valve with normal opening.   6. Estimated pulmonary artery systolic pressure is 36.2 mmHg assuming a right atrial pressure of 5 mmHg, which is consistent with borderline pulmonary hypertension.    PHYSICIAN INTERPRETATION:  Left Ventricle: The left ventricular internal cavity size is normal. There is moderate concentric left ventricular hypertrophy. Spectral Doppler shows impaired relaxation pattern of left ventricular myocardial filling (Grade I diastolic dysfunction).  Right Ventricle: Normal right ventricular size and function.  Left Atrium: Normal left atrial size.  Right Atrium: Normal right atrial size.  Pericardium: There is no evidence of pericardial effusion.  Mitral Valve: Structurally normal mitral valve, with normal leaflet excursion. The mitral valve is normal in structure. Mild mitral valve regurgitation is seen.  Tricuspid Valve: Structurally normal tricuspid valve, with normal leaflet excursion. The tricuspid valve is normal in structure. Mild tricuspid regurgitation is visualized. Estimated pulmonary artery systolic pressure is 36.2 mmHg assuming a right atrial pressure of 5 mmHg, which is consistent with borderline pulmonary hypertension.  Aortic Valve: The aortic valve is trileaflet. No evidence of aortic stenosis. Sclerotic aortic valve with normal opening. Trivial aortic valve regurgitation is seen.  Pulmonic Valve: Structurally normal pulmonic valve, with normal leaflet excursion. The pulmonic valve is normal. Trace pulmonic valve regurgitation.  Aorta: The aortic root and ascending aorta are structurally normal, with no evidence of dilitation.  Pulmonary Artery: The main pulmonary artery is normal in size.       2D AND M-MODE MEASUREMENTS (normal ranges within parentheses):  Left Ventricle:                  Normal   Aorta/Left Atrium:             Normal  IVSd (2D):              1.51 cm (0.7-1.1) AoV Cusp Separation: 1.74 cm (1.5-2.6)  LVPWd (2D):             1.51 cm (0.7-1.1) Left Atrium (Mmode): 2.63 cm (1.9-4.0)  LVIDd (2D):             3.22 cm (3.4-5.7) Right Ventricle:  LVIDs (2D):             1.90 cm           RVd (2D):        2.29 cm  LV FS (2D):             41.1%   (>25%)  Relative Wall Thickness  0.94    (<0.42)    SPECTRAL DOPPLER ANALYSIS:  LV DIASTOLIC FUNCTION:  MV Peak E: 0.60 m/s Decel Time: 229 msec  MV Peak A: 0.88 m/s  E/A Ratio: 0.68    Aortic Valve:  AoV VMax:    1.32 m/s AoV Area, Vmax: 2.41 cm² Vmax Indx: 1.29 cm²/m²  AoV Pk Grad: 7.0 mmHg    LVOT Vmax: 1.02 m/s  LVOT VTI:  0.20 m  LVOT Diam: 2.00 cm    Mitral Valve:  MV P1/2 Time: 66.49 msec  MV Area, PHT: 3.31 cm²    Tricuspid Valve and PA/RV Systolic Pressure: TR Max Velocity: 2.79 m/s RA Pressure: 5 mmHg RVSP/PASP: 36.2 mmHg       [id number] Alexander Alex M.D., Electronically signed on 2/26/2020 at 4:20:25 PM              *** Final ***                    ALEXANDER ALEX MD  This document has been electronically signed. Feb 26 2020  4:09PM             (02-26-20 @ 16:09)      MEDICATIONS  ALBUTerol    90 MICROgram(s) HFA Inhaler 4  calcitriol  Solution 0.25  calcium carbonate    500 mG (Tums) Chewable 3  chlorhexidine 0.12% Liquid 15  chlorhexidine 4% Liquid 1  chlorhexidine 4% Liquid 1  cyanocobalamin 1000  dexMEDEtomidine Infusion 0.2  dextrose 5%. 1000  dextrose 50% Injectable 12.5  dextrose 50% Injectable 25  dextrose 50% Injectable 25  fentaNYL   Infusion. 0.5  gabapentin 300  influenza   Vaccine 0.5  insulin regular Infusion 4  ipratropium 17 MICROgram(s) HFA Inhaler 4  lactated ringers Bolus 500  meropenem  IVPB   meropenem  IVPB 1000  methylPREDNISolone sodium succinate Injectable 30  multivitamin/minerals 1  mupirocin 2% Ointment 1  norepinephrine Infusion 0.05  pantoprazole   Suspension 40      WEIGHT  Weight (kg): 72.2 (02-26-20 @ 11:30)  Creatinine, Serum: 0.5 mg/dL (03-10-20 @ 04:05)      ANTIBIOTICS:  meropenem  IVPB      meropenem  IVPB 1000 milliGRAM(s) IV Intermittent every 8 hours      All available historical records have been reviewed

## 2020-03-10 NOTE — PROGRESS NOTE ADULT - ASSESSMENT
74y female with PMH of asthma, COPD not on home O2, autoimmune hepatitis on prednisone and tacrolimus, heterozygous prothrombin gene mutation with hx of PE and DVT on coumadin presents to the hospital complaining of cough, hemoptysis. Found to have viral pneumonia with respiratory failure and intubated. Extubated after prolonged course but required re-intubation 2/26 for worsening respiratory status and received IVC filter 2/26 because pt not safe for AC (alveolar hemorrhage).  As per palliative, Pt's family will continue treatment course but may pursue trial of extubation.      #) Diffuse Alveoloar hemorrhage  - completed Zyvox, Levaquin, Cefepime course, and oseltamivir   - C/w Duoneb   - Solumedrol IV 40mg q24hr  - Bronch results - neg culture, neg fungal, cytology positive for inflammatory cells, no organisms  - Bronch 2/16 showed bleeding from DAH  - Bronch 2/26 for BAL, cultures show gram - rods, start meropenem 2/27      #) Opacities Lung  - wbc increased from 4.03 (3/4) to 9.81 (3/5)  - bronchoscopy 3/5 for new right upper opacity on cxr, bronchial aspirate sent, f/u cultures  - start vancomycin, continue meropenem, f/u vanc levels before 30 mins before 4th dose (3/6 at 5:30 PM)  - 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  - started on nimbex drip 3/6 for persistent sedation and agitation on ventilator  - pending bronchial cultures from bronchoscopy 3/5    #) Tachycardia  - start cardizem 60 mg q8  - IV 10 metoprolol pushes if persistent tachy    #) Candidemia, secondary to central line  - cultures neg since 2/14  - oral fluconazole 14 days, switched to caspofungin for rising LFT (2/26), completed 2/27  - ID following  - repeat fungitell 3/4, continue caspofungin    #) Rectal Bleeding   - GI following   - received 1U of blood 2/23, hemoglobin stable  - Protonix 40mg PO qd   - FlexSig showed 2 rectal ulcers one clean one crater, s/p clipping  - CBC q24  - Avoid Dignshield or rectal tube   - s/p sigmoidoscopy recurrent rectal bleeding possibly from diverticulosis 2/28, received 2U pRBC,   - will need CT angio if bleeding again, f/u CBC    #) 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  - duplex shows DVT consistent with prior  - not candidate for AC at this time due to alveolar hemorrhage   - IVC filter placed 2/26    #Immunosuppressed: Autoimmune Hepatitis   - solumedrol 60 mg qd  - CMV PCR neg this admission     #Heterozygous prothrombin gene mutation with hx of PE and DVT on coumadin  - holding coumadin for now due to alveolar hemorrhage   - monitor coags    #0.5 cm of GB polyp  - repeat US abdomen in 6 months per GI    #Deconditioning   - pt severely deconditioned due to prolonged hospital stay  - PT/Rehab     #) MISC  Activity: intubated  DVT ppx: IVC filter  GI ppx: protonix  Diet: tube feeding  Code: Full  Dispo: medical optimization  CHG 74y female with PMH of asthma, COPD not on home O2, autoimmune hepatitis on prednisone and tacrolimus, heterozygous prothrombin gene mutation with hx of PE and DVT on coumadin presents to the hospital complaining of cough, hemoptysis. Found to have viral pneumonia with respiratory failure and intubated. Extubated after prolonged course but required re-intubation 2/26 for worsening respiratory status and received IVC filter 2/26 because pt not safe for AC (alveolar hemorrhage).  As per palliative, Pt's family will continue treatment course but may pursue trial of extubation.      #) Diffuse Alveoloar hemorrhage  - completed Zyvox, Levaquin, Cefepime course, and oseltamivir   - C/w Duoneb   - Solumedrol IV 40mg q24hr  - Bronch results - neg culture, neg fungal, cytology positive for inflammatory cells, no organisms  - Bronch 2/16 showed bleeding from DAH  - Bronch 2/26 for BAL, cultures show gram - rods, start meropenem 2/27    #) Opacities Lung  - wbc increased from 4.03 (3/4) to 9.81 (3/5)  - bronchoscopy 3/5 for new right upper opacity on cxr, bronchial aspirate sent, f/u cultures  - start vancomycin, continue meropenem, f/u vanc levels before 30 mins before 4th dose (3/6 at 5:30 PM)  - 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  - started on nimbex drip 3/6 for persistent sedation and agitation on ventilator  - pending bronchial cultures from bronchoscopy 3/5  - start azithro 500 mg qd, ethambutol 1000 mg for MAC  - repeat fungitell f/u    #) Tachycardia  - start cardizem 60 mg q8  - IV 10 metoprolol pushes if persistent tachy    #) Candidemia, secondary to central line  - cultures neg since 2/14  - oral fluconazole 14 days, switched to caspofungin for rising LFT (2/26), completed 2/27  - ID following  - repeat fungitell 3/4, continue caspofungin    #) Rectal Bleeding   - GI following   - received 1U of blood 2/23, hemoglobin stable  - Protonix 40mg PO qd   - FlexSig showed 2 rectal ulcers one clean one crater, s/p clipping  - CBC q24  - Avoid Dignshield or rectal tube   - s/p sigmoidoscopy recurrent rectal bleeding possibly from diverticulosis 2/28, received 2U pRBC,   - will need CT angio if bleeding again, f/u CBC    #) 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  - duplex shows DVT consistent with prior  - not candidate for AC at this time due to alveolar hemorrhage   - IVC filter placed 2/26    #Immunosuppressed: Autoimmune Hepatitis   - solumedrol 60 mg qd  - CMV PCR neg this admission     #Heterozygous prothrombin gene mutation with hx of PE and DVT on coumadin  - holding coumadin for now due to alveolar hemorrhage   - monitor coags    #0.5 cm of GB polyp  - repeat US abdomen in 6 months per GI    #Deconditioning   - pt severely deconditioned due to prolonged hospital stay  - PT/Rehab     #) MISC  Activity: intubated  DVT ppx: IVC filter  GI ppx: protonix  Diet: tube feeding  Code: Full  Dispo: medical optimization  CHG

## 2020-03-10 NOTE — PROGRESS NOTE ADULT - ATTENDING COMMENTS
I have personally seen and examined this patient.  I have fully participated in the care of this patient.  I have reviewed all pertinent clinical information, including history, physical exam, plan and note.   I have reviewed all pertinent clinical information and reviewed all relevant imaging and diagnostic studies personally.  Recommendations as above.  Agree with above assessment except as noted.
Pt seen examined case discussed with ICU team     poor prognosis
seen with fellow. follow prograf level. continue pulmonary support. no need for RRT.
Attending Statement: I have personally performed a face to face diagnostic evaluation on this patient. The patient is suffering from  Acute hypoxic respiratory failure . I have reviewed the above note and agree with the history, exam and suggestions for care, except as I have noted in the text.
Attending Statement: I have personally performed a face to face diagnostic evaluation on this patient. The patient is suffering from Acute hypoxic respiratory failure .  I have reviewed the above note and agree with the history, exam and suggestions for care, except as I have noted in the text.
I personally examined the patient. Rec FS for recurrent rectal bleeding.
Patient seen and examined. Agree with the above.
Patient seen by myself too. I agree with Dr. Wise's (Hem-Onc fellow) note above. Situation discussed with her as well as the ICU rounding team. No other suggestions from a hematologic standpoint besides what is written above by Dr. Wise. Overall it's a status quo.  Will continue to follow and make recommendations as needed.
Situation discussed with Dr. Andrew. Agree with her note above. Nothing to add at this time from a Hem-Onc standpoint.
Seen and examined with the pulmonary fellow at the bed side.  Impression and plan as outlined above.
agree with above, seen and examined, ARF/ FLU, doubt alveolar hemorrhage, steroids, abx not stable for bronch poor prognosis
patient seen and examined, agree with above, FLU+. doubt alveolar hemorrhage, intubation, abx poor prognosis
Seen and examined with the pulmonary fellow at the bed side.  Impression and plan as outlined above.

## 2020-03-10 NOTE — PROGRESS NOTE ADULT - ASSESSMENT
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  She has had long, complicated hospital course (see HPI), including intubation x 2, finding of alveolar hemorrhage, candidemia, REJI on CKD III (now resolved), L saphenous DVT, bleeding rectal ulcer, and IVC filter placement.  Hematology consulted for pancytopenia.    1. Pancytopenia-Likely multifactorial, including bone marrow suppression secondary to severe illness, sepsis, antibiotic use, poor nutrition, active bleeding (from alveolar hemorrhage and rectal ulcer), and frequent phlebotomy.  - LDH mildly elevated (400s) but haptoglobin elevated and bilirubin normal, not suggestive of hemolysis.  - Recommend continued supportive care. On antibiotics   - Maintain active type and screen.  - If not bleeding, transfuse for hemoglobin <7 and platelet count < 10.  - Daily CBC with differential.  - Will monitor.      2. 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  Patient is s/p IVC filter placement.    3.h/o Rectal bleed this admission- resolved   s/p sigmoidoscopy recurrent rectal bleeding possibly from diverticulosis 2/28, received 2U pRBC,   No transfusions in the last 10 days     She remains to be acutely ill and still intubated and on pressor support  Prognosis guarded. 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  She has had long, complicated hospital course (see HPI), including intubation x 2, finding of alveolar hemorrhage, candidemia, REJI on CKD III (now resolved), L saphenous DVT, bleeding rectal ulcer, and IVC filter placement.  Hematology consulted for pancytopenia.    1. Pancytopenia- recovering at this point. Likely multifactorial, including bone marrow suppression secondary to severe illness, sepsis, antibiotic use, poor nutrition, active bleeding (from alveolar hemorrhage and rectal ulcer), and frequent phlebotomy.  - LDH mildly elevated (400s) but haptoglobin elevated and bilirubin normal, not suggestive of hemolysis.  - Recommend continued supportive care. On antibiotics   - Maintain active type and screen.  - If not bleeding, transfuse for hemoglobin <7 and platelet count < 10.  - Daily CBC with differential.  - Will monitor. No active intervention from our end for the time being since hemogram looks better.    2. 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  Patient is s/p IVC filter placement.    3.h/o Rectal bleed this admission- resolved   s/p sigmoidoscopy recurrent rectal bleeding possibly from diverticulosis 2/28, received 2U pRBC.  No transfusions in the last 10 days.     She remains acutely ill and still intubated and on pressor support  Prognosis guarded. From hem-onc standpoint: c/w supportive measures and transfuse as needed to keep Hb>7 and plt>10 k or if actively bleeding.

## 2020-03-11 NOTE — PROGRESS NOTE ADULT - ASSESSMENT
IMPRESSION:    Acute hypoxic respiratory failure likely VTE SP GFF   Klebsiella in BAL treated   DAH resolving  Candidemia SP therapy   HO Autoimmune hepatitis on tacrolimus and solumedrol  Possible recurrent septic shock secondary to pneumonia, resolved       PLAN:    CNS:  SAT     HEENT: ET care.  Oral care.      PULMONARY:  HOB @ 45 degrees. Continue Solumedrol 30 mg daily. VENT Changes: Wean fio2  .      CARDIOVASCULAR:  I=O.     GI: GI prophylaxis.  Feeding OG feeding    RENAL:  Follow up lytes. Replete as needed.     INFECTIOUS DISEASE: Follow up cultures.  DC ABX    HEMATOLOGICAL:  DVT prophylaxis.  FU CBC and coags     ENDOCRINE:  Follow up FS.  Insulin protocol if needed    MUSCULOSKELETAL: Bed rest     Prognosis: Very Poor prognosis overall     Awaiting family's decision regarding trach VS liberation     Transfer to vent Unit

## 2020-03-11 NOTE — CHART NOTE - NSCHARTNOTEFT_GEN_A_CORE
74y female with PMH of asthma, COPD not on home O2, autoimmune hepatitis on prednisone and tacrolimus, heterozygous prothrombin gene mutation with hx of PE and DVT on coumadin presents to the hospital complaining of cough, hemoptysis. Found to have viral pneumonia with respiratory failure, diffuse alveolar hemorrhage and intubated. Extubated after prolonged course but required re-intubation 2/26 for worsening respiratory status and suspected PE, received IVC filter 2/26 because pt not safe for AC (alveolar hemorrhage). Pt continues to be intubated and on/off pressors, unable to be weaned off. Due to poor prognosis, pt's family elected for comfort measures only, DNR/DNI, no escalation of care, no feeding, IVF, ABX.      #) Diffuse Alveoloar hemorrhage  - completed Zyvox, Levaquin, Cefepime course, and oseltamivir   - C/w Duoneb   - Solumedrol IV 40mg q24hr  - Bronch results - neg culture, neg fungal, cytology positive for inflammatory cells, no organisms  - Bronch 2/16 showed bleeding from DAH  - Bronch 2/26 for BAL, cultures show gram - rods, start meropenem 2/27    #) Opacities Lung  - wbc increased from 4.03 (3/4) to 9.81 (3/5)  - bronchoscopy 3/5 for new right upper opacity on cxr, bronchial aspirate sent, f/u cultures  - start vancomycin, continue meropenem, f/u vanc levels before 30 mins before 4th dose (3/6 at 5:30 PM)  - 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  - started on nimbex drip 3/6 for persistent sedation and agitation on ventilator  - pending bronchial cultures from bronchoscopy 3/5  - start azithro 500 mg qd, ethambutol 1000 mg for MAC  - repeat fungitell f/u    #) Candidemia, secondary to central line  - cultures neg since 2/14  - oral fluconazole 14 days, switched to caspofungin for rising LFT (2/26), completed 2/27  - ID following  - repeat fungitell 3/4, continue caspofungin    #) Rectal Bleeding   - GI following   - received 1U of blood 2/23, hemoglobin stable  - Protonix 40mg PO qd   - FlexSig showed 2 rectal ulcers one clean one crater, s/p clipping  - CBC q24  - Avoid Dignshield or rectal tube   - s/p sigmoidoscopy recurrent rectal bleeding possibly from diverticulosis 2/28, received 2U pRBC,   - will need CT angio if bleeding again, f/u CBC    #) 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  - duplex shows DVT consistent with prior  - not candidate for AC at this time due to alveolar hemorrhage   - IVC filter placed 2/26    #Immunosuppressed: Autoimmune Hepatitis   - solumedrol 60 mg qd  - CMV PCR neg this admission     #Heterozygous prothrombin gene mutation with hx of PE and DVT on coumadin  - holding coumadin for now due to alveolar hemorrhage   - monitor coags    #0.5 cm of GB polyp  - repeat US abdomen in 6 months per GI    #Deconditioning   - pt severely deconditioned due to prolonged hospital stay  - PT/Rehab     #) MISC  Activity: intubated  DVT ppx: IVC filter  GI ppx: protonix  Diet: tube feeding  Code: Full  Dispo: medical optimization  CHG. 74y female with PMH of asthma, COPD not on home O2, autoimmune hepatitis on prednisone and tacrolimus, heterozygous prothrombin gene mutation with hx of PE and DVT on coumadin presents to the hospital complaining of cough, hemoptysis. Found to have viral pneumonia with respiratory failure, diffuse alveolar hemorrhage and intubated complicated by lower GI bleeding that required 3U pRBC total. Extubated after prolonged course but required re-intubation 2/26 for worsening respiratory status and suspected PE, received IVC filter 2/26 because pt not safe for AC (alveolar hemorrhage). Pt continues to be intubated and on/off pressors, unable to be weaned off. Due to poor prognosis, pt's family elected for comfort measures only, DNR/DNI, no escalation of care, no feeding, IVF, ABX.      #) Diffuse Alveoloar hemorrhage  - completed Zyvox, Levaquin, Cefepime course, and oseltamivir   - C/w Duoneb   - Solumedrol IV 40mg q24hr  - Bronch results - neg culture, neg fungal, cytology positive for inflammatory cells, no organisms  - Bronch 2/16 showed bleeding from DAH  - Bronch 2/26 for BAL, cultures show gram - rods, start meropenem 2/27    #) Opacities Lung  - wbc increased from 4.03 (3/4) to 9.81 (3/5)  - bronchoscopy 3/5 for new right upper opacity on cxr, bronchial aspirate sent, f/u cultures  - start vancomycin, continue meropenem, f/u vanc levels before 30 mins before 4th dose (3/6 at 5:30 PM)  - 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  - started on nimbex drip 3/6 for persistent sedation and agitation on ventilator  - pending bronchial cultures from bronchoscopy 3/5  - start azithro 500 mg qd, ethambutol 1000 mg for MAC  - repeat fungitell f/u    #) Candidemia, secondary to central line  - cultures neg since 2/14  - oral fluconazole 14 days, switched to caspofungin for rising LFT (2/26), completed 2/27  - ID following  - repeat fungitell 3/4, continue caspofungin    #) Rectal Bleeding   - GI following   - received 1U of blood 2/23, hemoglobin stable  - Protonix 40mg PO qd   - FlexSig showed 2 rectal ulcers one clean one crater, s/p clipping  - CBC q24  - Avoid Dignshield or rectal tube   - s/p sigmoidoscopy recurrent rectal bleeding possibly from diverticulosis 2/28, received 2U pRBC,   - will need CT angio if bleeding again, f/u CBC    #) 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  - duplex shows DVT consistent with prior  - not candidate for AC at this time due to alveolar hemorrhage   - IVC filter placed 2/26    #Immunosuppressed: Autoimmune Hepatitis   - solumedrol 60 mg qd  - CMV PCR neg this admission     #Heterozygous prothrombin gene mutation with hx of PE and DVT on coumadin  - holding coumadin for now due to alveolar hemorrhage   - monitor coags    #0.5 cm of GB polyp  - repeat US abdomen in 6 months per GI    #Deconditioning   - pt severely deconditioned due to prolonged hospital stay  - PT/Rehab     #) MISC  Activity: intubated  DVT ppx: IVC filter  GI ppx: protonix  Diet: tube feeding  Code: Full  Dispo: medical optimization  CHG.

## 2020-03-11 NOTE — DISCHARGE NOTE FOR THE EXPIRED PATIENT - HOSPITAL COURSE
4y female with PMH of asthma, COPD not on home O2, autoimmune hepatitis on prednisone and tacrolimus, heterozygous prothrombin gene mutation with hx of PE and DVT on coumadin presents to the hospital complaining of cough, hemoptysis. Found to have viral pneumonia with respiratory failure, diffuse alveolar hemorrhage and intubated complicated by lower GI bleeding that required 3U pRBC total. Extubated after prolonged course but required re-intubation 2/26 for worsening respiratory status and suspected PE, received IVC filter 2/26 because pt not safe for AC (alveolar hemorrhage). Pt continues to be intubated and on/off pressors, unable to be weaned off. Due to poor prognosis, 3/11 pt's family elected for comfort measures only, DNR/DNI, no escalation of care, no feeding, IVF, ABX. Pt found pulseless, unresponsive with no chest rise at 3:15 pm on 3/11. Pt's family notified.

## 2020-03-11 NOTE — DISCHARGE NOTE PROVIDER - NSDCCPCAREPLAN_GEN_ALL_CORE_FT
PRINCIPAL DISCHARGE DIAGNOSIS  Diagnosis: Cardiopulmonary arrest  Assessment and Plan of Treatment:       SECONDARY DISCHARGE DIAGNOSES  Diagnosis: Pneumonia  Assessment and Plan of Treatment:     Diagnosis: Hemoptysis  Assessment and Plan of Treatment:     Diagnosis: Shortness of breath  Assessment and Plan of Treatment:

## 2020-03-11 NOTE — DISCHARGE NOTE PROVIDER - HOSPITAL COURSE
74y female with PMH of asthma, COPD not on home O2, autoimmune hepatitis on prednisone and tacrolimus, heterozygous prothrombin gene mutation with hx of PE and DVT on coumadin presents to the hospital complaining of cough, hemoptysis. Found to have viral pneumonia with respiratory failure, diffuse alveolar hemorrhage and intubated complicated by lower GI bleeding that required 3U pRBC total. Extubated after prolonged course but required re-intubation 2/26 for worsening respiratory status and suspected PE, received IVC filter 2/26 because pt not safe for AC (alveolar hemorrhage). Pt continues to be intubated and on/off pressors, unable to be weaned off. Due to poor prognosis, 3/11 pt's family elected for comfort measures only, DNR/DNI, no escalation of care, no feeding, IVF, ABX. Pt found pulseless, unresponsive with no chest rise at 3:15 pm on 3/11. Pt's family notified.

## 2020-03-11 NOTE — GOALS OF CARE CONVERSATION - ADVANCED CARE PLANNING - TREATMENT GUIDELINE COMMENT
All medications and life saving pressors ABX have been discontinued. No vitals and no finger sticks or labs

## 2020-03-11 NOTE — PROGRESS NOTE ADULT - NSHPATTENDINGPLANDISCUSS_GEN_ALL_CORE
House staff
ICU team
TEAM
pt ,, DANIEL NICOLE
pt, CC- fellow, , son, RN, and BONNIE
son, , BONNIE
son, , BONNIE
team
ICU team
ICU team, call to 
ICu team
Medicine resident
team
ICU team
ICU team
See above.
See above.
team
team
ICU team
team
resident
resident
team

## 2020-03-11 NOTE — PROGRESS NOTE ADULT - SUBJECTIVE AND OBJECTIVE BOX
Chart reviewed, patient examined. Pertinent results reviewed.  Case discussed with HO; specialist f/u reviewed  HD#40  Patient making no progress in weaning or getting off the respirator in the last week. She is unweanable.  There is been no significant change over the last 24 hours.    HPI:  75y/o F w/ hx of asthma, COPD not on home O2, autoimmune hepatitis on prednisone and tacrolimus, heterozygous prothrombin gene mutation with hx of PE and DVT on coumadin with recent hospitalization in January 2020 with a diagnosis of pneumonia presents for worsening SOB, hemoptysis and cough. Everything started yesterday night when patient was in bed, started to feel shortness of breath and had many episodes of hemoptysis with clots, she also had substernal chest pain non radiating, moderate in intensity that worsens on coughing. She denies fever but endorses chills. She also admits for lightheadedness that occurred yesterday, no HA, abd pain, dysuria or paresthesias. She had 2 loose bowel movements since yesterday with some blood in the stools that she attributes to her hemorrhoids. She stopped Coumadin couple of days ago since her INR was supratherapeutic. She is from [facility] short term and also mentions that her  and another family member have the flu and she was exposed to them. She did not have the flu shot this season.  In ED, temp was 100.1 rectally, tachycardic ( sinus) , she was saturating to 70s on non rebreather and her SaO2 went up to 95%. ABG showing respiratory alkalosis initially and then corrected after BIPAP placement and correction of RR.  CTA chest showing no PE but showing interval development of multifocal bilateral groundglass opacities as well as new moderate to severe bronchiectasis in the right lung. Findings are concerning for an acute infectious/inflammatory process. (31 Jan 2020 14:36)  INTERVAL EVENTS: Patient seen today remains vented on FIO2 50%, sedated.     MEDICATIONS  (STANDING):  chlorhexidine 4% Liquid 1 Application(s) Topical daily  midazolam Injectable 2 milliGRAM(s) IV Push once  morphine  Infusion 4 mG/Hr (4 mL/Hr) IV Continuous <Continuous>    MEDICATIONS  (PRN):  LORazepam   Injectable 2 milliGRAM(s) IV Push every 1 hour PRN Agitation / anxiety / labored breathing    Vital Signs Last 24 Hrs  T(C): 36.1 (11 Mar 2020 08:00), Max: 36.4 (10 Mar 2020 16:00)  T(F): 96.9 (11 Mar 2020 08:00), Max: 97.6 (10 Mar 2020 16:00)  HR: 78 (11 Mar 2020 11:15) (52 - 98)  BP: 160/71 (11 Mar 2020 08:00) (68/40 - 203/101)  BP(mean): 104 (11 Mar 2020 08:00) (51 - 146)  RR: 30 (11 Mar 2020 11:15) (14 - 44)  SpO2: 98% (11 Mar 2020 11:15) (95% - 100%)    PHYSICAL EXAM:  GENERAL: Vented, Sedated- on 50%O2; ETT/OGT; Eyelids open and eyes rolled up bilaterally  NECK: Supple, No JVD  CHEST/LUNG: clear, + bilat air mvt-fair  HEART: S1, S2, RRR  ABDOMEN: Soft, Nontender,  Bowel sounds present  EXTREMITIES: + Anasarca, Bruised, Multiple skin tears  SKIN: Multiple skin lesions- Purpuric lesions over trunk and extremities-    LABS:                               7.2    6.13  )-----------( 75       ( 11 Mar 2020 04:40 )             22.1                      8.0    6.99  )-----------( 98       ( 09 Mar 2020 04:30 )        03-11    132<L>  |  100  |  48<H>  ----------------------------<  221<H>  4.8   |  24  |  <0.5<L>    Ca    7.2<L>      11 Mar 2020 04:40  Phos  2.6     03-11  Mg     1.8     03-11    TPro  4.1<L>  /  Alb  1.5<L>  /  TBili  0.7  /  DBili  x   /  AST  22  /  ALT  22  /  AlkPhos  277<H>  03-11    03-09    134<L>  |  100  |  52<H>  ----------------------------<  46<L>  5.0   |  26  |  <0.5<L>    Ca    7.3<L>      09 Mar 2020 04:30  Mg     2.0     03-09    TPro  4.8<L>  /  Alb  1.7<L>  /  TBili  0.6  /  DBili  x   /  AST  23  /  ALT  33  /  AlkPhos  451<H>  03-09    ABG - ( 09 Mar 2020 04:25 )  pH, Arterial: 7.39  pH, Blood: x     /  pCO2: 48    /  pO2: 64    / HCO3: 29    / Base Excess: 3.5   /  SaO2: 96                    Culture - Bronchial (02.26.20 @ 14:45)    -  Amoxicillin/Clavulanic Acid: S <=8/4    -  Amikacin: S <=16    Gram Stain:   Moderate polymorphonuclear leukocytes seen per low power field  Rare Squamous epithelial cells seen per low power field  Numerous Gram Negative Rods seen per oil power field    -  Ciprofloxacin: S <=1    -  Aztreonam: S <=4    -  Cefepime: S <=2    -  Trimethoprim/Sulfamethoxazole: S <=2/38    -  Ertapenem: S <=0.5    -  Gentamicin: S <=2    -  Imipenem: S <=1    -  Levofloxacin: S <=2    -  Meropenem: S <=1    -  Piperacillin/Tazobactam: S <=8    -  Tobramycin: S <=2    -  Ampicillin: R >16 These ampicillin results predict results for amoxicillin    -  Cefazolin: S <=2 Enterobacter, Citrobacter, and Serratia may develop resistance during prolonged therapy (3-4 days)    -  Cefoxitin: S <=8    -  Ampicillin/Sulbactam: S 8/4 Enterobacter, Citrobacter, and Serratia may develop resistance during prolonged therapy (3-4 days)    -  Ceftriaxone: S <=1 Enterobacter, Citrobacter, and Serratia may develop resistance during prolonged therapy    Specimen Source: .Bronchial None    Culture Results:   Numerous Klebsiella pneumoniae  Normal Respiratory Nikki present    Organism Identification: Klebsiella pneumoniae    Organism: Klebsiella pneumoniae    Method Type: EROS      RADIOLOGY & ADDITIONAL TESTS:  < from: Xray Chest 1 View- PORTABLE-Routine (03.03.20 @ 05:04) >  Findings:    Support devices: Stable right IJ central venous catheter and enteric tube. Endotracheal tube tip, approximately 2.6 cm from baljinder.    Cardiac/mediastinum/hilum: Limited evaluation, heart borders are obscured.    Lungparenchyma/Pleura: Unchanged diffuse bilateral opacities. No pneumothorax.    Skeleton/soft tissues: Unchanged.    Impression:      Unchanged diffuse bilateral opacities. No pneumothorax.    Support tubes as per above.        < end of copied text >

## 2020-03-11 NOTE — PROGRESS NOTE ADULT - ASSESSMENT
75 y/o female with pmhx of asthma, HTN,  autoimmune hepatitis, heterozygous prothrombin gene mutation with hx of PE and DVT on coumadin admitted for SOB     Acute hypoxic resp failure with hypoxia due to influenza, HCAP; 10 reintubation secondary to acute PE  Septic shock in immunocompromised patient, Severe sepsis present on admission   - completed course of treatment- antibiotics and antiviral completed for initial infection  - MRSA VAP treated with Zyvox for 10 days   - remains intubated and sedated- & failing initial weaning attempts  - PE, HCAP pneumonia, GNR on BAL : Klebsiella   - GFF placed due to PE   - remains on IV Solu-medrol  - ID f/u noted, remains on Meropenem  - continue Albuterol  - Pulmonary/cc plan bronchoscopy to try and clear secretions and optimize another extubation attempt  -Spoke to  and son who  Have decided that liberation is the correct choice.       They are reading the patient's other living son to proceed with operation today.  Multiple Skin tears/ wounds  - local care  - elevate extremities  - await burn evaluation    Candedemia  - central line change noted  - cultures negative x2  - 2D echo no evidence of vegetation   - ophth evaluation noted, no clinical evidence, low suspicion for ocular fungemia/endogenous endophthalmitis   - completed treatment  2/27    REJI on CKD 3  - creatinine normal  - renal f/u noted  - urine output noted  - continue to monitor  - follow renal recommendations    Acute Blood Loss Anemia  - had rectal tube earlier on this adm which was discontinued due to ulcer  - on Protonix  - has had multiple PRBC's this adm    Autoimmune hepatitis  - On prednisone 60 mg PO qd and Tacrolimus at home  - now on Solu-medrol  - Tacrolimus held    HTN by hx  - now off Labetolol    Heterozygous prothrombin gene mutation with hx of PE and DVT on coumadin:  - Coumadin had been held for alveolar hemorrhage     Protein Malnutrition  - Alb noted, trending back up  - tolerating enteral feedings    Diet: Enteral feedings   GI PPx: Protonix  DVT PPx: sequentials  Activity: bedrest  Dispo: readmitted to hospital from [facility] rehab at [facility], remains ICU      Continue supportive measures, patient remains acutely ill,  Overall prognosis poor.     Last week-Had prolonged and niki discussion with patient's , Alejo.  We discussed his prior feelings about prolonged debilitating situation.  At this point pulmonary/critical care Feels liberation or tracheostomy on choices.  tracheostomy  was discussed again.  Potential outcomes with tracheostomy were discussed discussed including waking up being communicative and slowly improving and possibly weaning versus remaining dependent on tracheostomy for prolonged period of time.  There is also the possibility of the patient not waking him much at all once sedatives are held.  Family understands these choices -;last week wanted to forge ahead including resuscitation Now they have changed their mind.  Dr. Price Has reviewed with the family Multiple times.  They are comfortable in a decision and will proceed with liberation today.

## 2020-03-11 NOTE — PROGRESS NOTE ADULT - SUBJECTIVE AND OBJECTIVE BOX
Patient is a 75y old  Female who presents with a chief complaint of SOB and desaturation (10 Mar 2020 20:58)        Over Night Events:  remains on MV.  Off pressors.  Sedated.          ROS:     CONSTITUTIONAL:   no fever   no chills.  no weight gain   no weight loss    EYES:   no discharge,   no pain  no redness,   no visual changes.    ENT:   Ears: no ear pain and no hearing problems.  Nose: no nasal congestion and no nasal drainage.  Mouth/Throat: no dysphagia,  no hoarseness and no throat pain.  Neck: no lumps, no pain, no stiffness and no swollen glands.     CARDIOVASCULAR:   Per HPI    RESPIRATORY:  Per HPI    GASTROINTESTINAL:   no abdominal pain,   no constipation,   no diarrhea,   no vomiting.    GENITOURINARY:  no dysuria,   no frequency,   no urgency  no hematuria.    MUSCULOSKELETAL:   no back pain,   no musculoskeletal pain,  no weakness.    SKIN:   no jaundice,   no lesions,   no pruritis,   no rashes.    NEURO:   Sedated     PSYCHIATRIC:   Sedated     ALLERGIC/IMMUNOLOGIC:   No active allergic or immunologic issues        PHYSICAL EXAM    ICU Vital Signs Last 24 Hrs  T(C): 36.4 (10 Mar 2020 20:00), Max: 36.8 (10 Mar 2020 12:00)  T(F): 97.5 (10 Mar 2020 20:00), Max: 98.2 (10 Mar 2020 12:00)  HR: 90 (11 Mar 2020 07:00) (64 - 110)  BP: 103/58 (11 Mar 2020 04:00) (68/40 - 203/101)  BP(mean): 68 (11 Mar 2020 04:00) (51 - 146)  ABP: 182/76 (11 Mar 2020 07:00) (70/34 - 212/88)  ABP(mean): 120 (11 Mar 2020 07:00) (46 - 138)  RR: 33 (11 Mar 2020 07:00) (14 - 44)  SpO2: 97% (11 Mar 2020 07:00) (91% - 100%)      CONSTITUTIONAL:   Ill appearing.  Well nourished.  NAD    ENT:   Airway patent,   Mouth with normal mucosa.   No thrush    CARDIAC:   Normal rate,   Regular rhythm.     No edema      RESPIRATORY:   NO wheezing  Bilateral BS  Normal chest expansion  Not tachypneic,  No use of accessory muscles    GASTROINTESTINAL:  Abdomen soft,   Non-tender,   No guarding,   + BS    MUSCULOSKELETAL:   Range of motion is not limited,  No clubbing, cyanosis    NEUROLOGICAL:   Sedated l    SKIN:   Skin normal color for race,   warm,   No evidence of rash.        03-10-20 @ 07:01  -  03-11-20 @ 07:00  --------------------------------------------------------  IN:    dexmedetomidine Infusion: 650.4 mL    fentaNYL Infusion.: 758.4 mL    Free Water: 750 mL    insulin regular Infusion: 45 mL    IV PiggyBack: 150 mL    norepinephrine Infusion: 39.7 mL    Osmolite: 960 mL  Total IN: 3353.5 mL    OUT:    Indwelling Catheter - Urethral: 860 mL  Total OUT: 860 mL    Total NET: 2493.5 mL          LABS:                            7.2    6.13  )-----------( 75       ( 11 Mar 2020 04:40 )             22.1                   7.2    6.13  )-----------( 75       ( 03-11 @ 04:40 )             22.1                7.4    5.61  )-----------( 84       ( 03-10 @ 04:05 )             22.9                8.0    6.99  )-----------( 98       ( 03-09 @ 04:30 )             25.5                                                03-11    132<L>  |  100  |  48<H>  ----------------------------<  221<H>  4.8   |  24  |  <0.5<L>    11 Mar 2020 04:40    132<L>  |  100    |  48<H>  ----------------------------<  221<H>  4.8     |  24     |  <0.5<L>  10 Mar 2020 04:05    129<L>  |  97<L>  |  47<H>  ----------------------------<  405<H>  4.9     |  23     |  0.5<L>    Ca    7.2<L>      11 Mar 2020 04:40  Ca    6.9<L>      10 Mar 2020 04:05  Phos  2.6       11 Mar 2020 04:40  Mg     1.8       11 Mar 2020 04:40    TPro  4.1<L>  /  Alb  1.5<L>  /  TBili  0.7    /  DBili  x      /  AST  22     /  ALT  22     /  AlkPhos  277<H>  11 Mar 2020 04:40  TPro  4.0<L>  /  Alb  1.5<L>  /  TBili  0.5    /  DBili  0.3<H>  /  AST  17     /  ALT  24     /  AlkPhos  310<H>  10 Mar 2020 04:05      Ca    7.2<L>      11 Mar 2020 04:40  Phos  2.6     03-11  Mg     1.8     03-11    TPro  4.1<L>  /  Alb  1.5<L>  /  TBili  0.7  /  DBili  x   /  AST  22  /  ALT  22  /  AlkPhos  277<H>  03-11                                                                                           LIVER FUNCTIONS - ( 11 Mar 2020 04:40 )  Alb: 1.5 g/dL / Pro: 4.1 g/dL / ALK PHOS: 277 U/L / ALT: 22 U/L / AST: 22 U/L / GGT: x                                                                                               Mode: AC/ CMV (Assist Control/ Continuous Mandatory Ventilation)  RR (machine): 20  TV (machine): 500  FiO2: 60  PEEP: 8  ITime: 1  MAP: 18  PIP: 34                                      ABG - ( 11 Mar 2020 04:20 )  pH, Arterial: 7.36  pH, Blood: x     /  pCO2: 46    /  pO2: 111   / HCO3: 26    / Base Excess: 0.6   /  SaO2: 98                  MEDICATIONS  (STANDING):  ALBUTerol    90 MICROgram(s) HFA Inhaler 4 Puff(s) Inhalation every 6 hours  calcitriol  Solution 0.25 MICROGram(s) Oral daily  calcium carbonate    500 mG (Tums) Chewable 3 Tablet(s) Chew every 8 hours  chlorhexidine 0.12% Liquid 15 milliLiter(s) Oral Mucosa two times a day  chlorhexidine 4% Liquid 1 Application(s) Topical daily  cyanocobalamin 1000 MICROGram(s) Oral daily  dexMEDEtomidine Infusion 0.2 MICROgram(s)/kG/Hr (3.61 mL/Hr) IV Continuous <Continuous>  dextrose 5%. 1000 milliLiter(s) (50 mL/Hr) IV Continuous <Continuous>  dextrose 50% Injectable 12.5 Gram(s) IV Push once  dextrose 50% Injectable 25 Gram(s) IV Push once  dextrose 50% Injectable 25 Gram(s) IV Push once  fentaNYL   Infusion. 0.5 MICROgram(s)/kG/Hr (3.61 mL/Hr) IV Continuous <Continuous>  gabapentin 300 milliGRAM(s) Oral at bedtime  influenza   Vaccine 0.5 milliLiter(s) IntraMuscular once  insulin glargine Injectable (LANTUS) 45 Unit(s) SubCutaneous at bedtime  insulin lispro (HumaLOG) corrective regimen sliding scale   SubCutaneous every 6 hours  insulin lispro Injectable (HumaLOG) 11 Unit(s) SubCutaneous every 6 hours  insulin regular Infusion 4 Unit(s)/Hr (4 mL/Hr) IV Continuous <Continuous>  ipratropium 17 MICROgram(s) HFA Inhaler 4 Puff(s) Inhalation every 6 hours  meropenem  IVPB      meropenem  IVPB 1000 milliGRAM(s) IV Intermittent every 8 hours  methylPREDNISolone sodium succinate Injectable 30 milliGRAM(s) IV Push daily  multivitamin/minerals 1 Tablet(s) Oral daily  mupirocin 2% Ointment 1 Application(s) Topical two times a day  norepinephrine Infusion 0.05 MICROgram(s)/kG/Min (3.384 mL/Hr) IV Continuous <Continuous>  pantoprazole   Suspension 40 milliGRAM(s) Oral before breakfast    MEDICATIONS  (PRN):  acetaminophen   Tablet .. 650 milliGRAM(s) Oral every 6 hours PRN Temp greater or equal to 38C (100.4F)  dextrose 40% Gel 15 Gram(s) Oral once PRN Blood Glucose LESS THAN 70 milliGRAM(s)/deciliter  glucagon  Injectable 1 milliGRAM(s) IntraMuscular once PRN Glucose LESS THAN 70 milligrams/deciliter      New X-rays reviewed:                                                                                  ECHO    CXR interpreted by me:  ET OG OK.  Bilateral infiltrates

## 2020-03-11 NOTE — GOALS OF CARE CONVERSATION - ADVANCED CARE PLANNING - TREATMENT GUIDELINES
No IV fluids/DNR Order/No blood draws/Do not re-hospitalize/Comfort measures only/No artificial nutrition/No antibiotics

## 2020-03-11 NOTE — PROGRESS NOTE ADULT - REASON FOR ADMISSION
SOB and desaturation

## 2020-03-11 NOTE — DISCHARGE NOTE PROVIDER - NSDCMRMEDTOKEN_GEN_ALL_CORE_FT
acetaminophen 500 mg oral tablet: 2 tab(s) orally every 8 hours  amLODIPine 10 mg oral tablet: 1 tab(s) orally once a day (at bedtime)  budesonide 3 mg oral capsule, extended release: 1 cap(s) orally 2 times a day  ferrous sulfate 325 mg (65 mg elemental iron) oral delayed release tablet: 1 tab(s) orally once a day  furosemide 20 mg oral tablet: 1 tab(s) orally once a day  gabapentin 300 mg oral capsule: 1 cap(s) orally once a day (at bedtime)  ibuprofen 400 mg oral tablet: 1 tab(s) orally every 6 hours, As needed, Moderate Pain (4 - 6)  ipratropium-albuterol 0.5 mg-2.5 mg/3 mLinhalation solution: 3 milliliter(s) inhaled every 6 hours, As Needed  lidocaine 5% topical film: Apply topically to affected area once a day  Metoprolol Tartrate 50 mg oral tablet: 1 tab(s) orally once a day  montelukast 10 mg oral tablet: 1 tab(s) orally once a day (at bedtime)  Multiple Vitamins with Minerals oral tablet: 1 tab(s) orally once a day  oxyCODONE 10 mg oral tablet: 1 tab(s) orally every 6 hours, As needed, Severe Pain (7 - 10)  pantoprazole 40 mg oral delayed release tablet: 1 tab(s) orally once a day (before a meal)  predniSONE 20 mg oral tablet: 3 tab(s) orally once a day  ProAir HFA 90 mcg/inh inhalation aerosol: 1 puff(s) inhaled every 4 hours, As Needed  risedronate 150 mg oral tablet: 1 tab(s) orally once a month  senna oral tablet: 2 tab(s) orally once a day (at bedtime), As Needed -for constipation   tacrolimus 0.5 mg oral capsule: 1 cap(s) orally every 12 hours  tiotropium 18 mcg inhalation capsule: 1 cap(s) inhaled once a day, As Needed  Vitamin B12 1000 mcg oral tablet: 1 tab(s) orally once a day  Vitamin C 1000 mg oral tablet: 1 tab(s) orally once a day  warfarin 4 mg oral tablet: 1 tab(s) orally once a day (at bedtime)

## 2020-03-11 NOTE — GOALS OF CARE CONVERSATION - ADVANCED CARE PLANNING - CONVERSATION DETAILS
Discussed with Mr Alejo Hudson,  of patient Griselda Hudson as well as her son poor prognosis and hospital course thus far. They have elected for comfort measures only, DNR DNI, no escelation of care, no feeding, no IVF and no ABX. This was discussed extensively with Attending Dr. Price. All questions answered.

## 2020-03-12 LAB
CULTURE RESULTS: SIGNIFICANT CHANGE UP
SPECIMEN SOURCE: SIGNIFICANT CHANGE UP

## 2020-03-13 LAB — FUNGITELL: 230 PG/ML — HIGH

## 2020-04-02 LAB
CULTURE RESULTS: SIGNIFICANT CHANGE UP
SPECIMEN SOURCE: SIGNIFICANT CHANGE UP

## 2020-04-15 LAB
CULTURE RESULTS: SIGNIFICANT CHANGE UP
SPECIMEN SOURCE: SIGNIFICANT CHANGE UP

## 2020-04-25 LAB
CULTURE RESULTS: SIGNIFICANT CHANGE UP
SPECIMEN SOURCE: SIGNIFICANT CHANGE UP

## 2020-05-14 NOTE — H&P ADULT - NSICDXFAMHXPERTINENTNEGATIVE_GEN_A_CORE_FT
Called and spoke to patients wife and states  is feeling better today. I advised for now he stay home rest, push fluids and eat a bland diet. If anything worsens she is to call back.    X

## 2020-06-26 NOTE — ED ADULT NURSE NOTE - NS ED NOTE  TALK SOMEONE YN
Please follow-up with Dr. Crowell, he will contact you to arrange for additional follow-up likely with a gastroenterologist.  Please take medicines as prescribed, return if any symptoms change or worsen.  
No

## 2020-08-26 NOTE — PATIENT PROFILE ADULT - CENTRAL VENOUS CATHETER/PICC LINE
Ongoing SW/CM Assessment/Plan of Care Note     See SW/CM flowsheets for goals and other objective data.    Progress note:   Kimberlee from  spoke to DTR, who was receptive to the information about hospice but wants to talk to the other family members.    Kimberlee has another meeting scheduled with the whole family tomorrow at 10am, the daughter and son are out of state so they will be on the phone while pt and spouse will be in the room along with the .    no

## 2020-09-09 NOTE — ED ADULT NURSE NOTE - EXTENSIONS OF SELF_ADULT
Patient verbalizes understanding of discharge instructions and incision site care. Patient discharged home with son.  
None

## 2021-10-06 PROBLEM — I10 ESSENTIAL HYPERTENSION: Status: ACTIVE | Noted: 2017-06-27

## 2021-12-27 NOTE — DISCHARGE NOTE ADULT - BLOOD LEVEL. WHEN WARFARIN/COUMADIN IS TAKEN WITH OTHER MEDICINES IT CAN CHANGE THE WAY OTHER MEDICINES WORK. OTHER MEDICINES CAN ALSO CHANGE THE WAY WARFARIN/COUMADIN WORKS. IT IS VERY
Problem: Falls - Risk of  Goal: *Absence of Falls  Description: Document Bard  Fall Risk and appropriate interventions in the flowsheet. Outcome: Progressing Towards Goal  Note: Fall Risk Interventions:  Mobility Interventions: Communicate number of staff needed for ambulation/transfer,Utilize walker, cane, or other assistive device         Medication Interventions: Patient to call before getting OOB,Teach patient to arise slowly    Elimination Interventions: Call light in reach,Stay With Me (per policy)    History of Falls Interventions: Utilize gait belt for transfer/ambulation,Room close to nurse's station         Problem: Patient Education: Go to Patient Education Activity  Goal: Patient/Family Education  Outcome: Progressing Towards Goal     Problem: Hypotension  Goal: *Blood pressure within specified parameters  Outcome: Progressing Towards Goal  Goal: *Fluid volume balance  Outcome: Progressing Towards Goal  Goal: *Labs within defined limits  Outcome: Progressing Towards Goal     Problem: Patient Education: Go to Patient Education Activity  Goal: Patient/Family Education  Outcome: Progressing Towards Goal     Problem: Falls - Risk of  Goal: *Absence of Falls  Description: Document Daylin Fall Risk and appropriate interventions in the flowsheet.   Outcome: Progressing Towards Goal  Note: Fall Risk Interventions:  Mobility Interventions: Communicate number of staff needed for ambulation/transfer,Utilize walker, cane, or other assistive device         Medication Interventions: Patient to call before getting OOB,Teach patient to arise slowly    Elimination Interventions: Call light in reach,Stay With Me (per policy)    History of Falls Interventions: Utilize gait belt for transfer/ambulation,Room close to nurse's station         Problem: Patient Education: Go to Patient Education Activity  Goal: Patient/Family Education  Outcome: Progressing Towards Goal     Problem: Hypotension  Goal: *Blood pressure within specified parameters  Outcome: Progressing Towards Goal  Goal: *Fluid volume balance  Outcome: Progressing Towards Goal  Goal: *Labs within defined limits  Outcome: Progressing Towards Goal     Problem: Patient Education: Go to Patient Education Activity  Goal: Patient/Family Education  Outcome: Progressing Towards Goal Statement Selected

## 2022-03-31 NOTE — PROGRESS NOTE ADULT - SUBJECTIVE AND OBJECTIVE BOX
Chart reviewed, patient examined. Pertinent results reviewed.  Case discussed with HO; specialist f/u reviewed  HD#5; Patient continues to have significant posterior thoracic pain    Patient is a 74y old  Female who presented with a chief complaint of Shortness of breath (17 Jan 2020 16:10) Which has improved mildly with the present therapeutic course.        -PMHx: Autoimmune hepatitis [Active]  Other chronic pulmonary embolism without acute cor pulmonale [Active]  Essential hypertension [Active]  Autoimmune hepatitis treated with steroids [Active]  Asthma [Active]  DVT (deep venous thrombosis) [Active]    -PSHx:S/P debridement  History of back surgery  No significant past surgical history      Interval events:  Patient seen and examined at bedside. Overnight, she was complaining of severe back pain that hurt when she moved.   Patient has been seen by the pain doctor, ID and pulmonary follow-up.    REVIEW OF SYSTEMS:  CONSTITUTIONAL: No fever, weight loss, or fatigue  RESPIRATORY: No cough, wheezing, chills or hemoptysis; less shortness of breath-Main issue is pain on breathing or movement  CARDIOVASCULAR: + Post chest pain; no palpitations, dizziness, or leg swelling  GASTROINTESTINAL: No abdominal or epigastric pain. Positive mild to moderate nausea; no vomiting, or hematemesis; No diarrhea or constipation. No melena or hematochezia.  NEUROLOGICAL: No headaches  LYMPH NODES: No enlarged glands  MUSCULOSKELETAL: +Back pain    MEDICATIONS  (STANDING):  acetaminophen   Tablet .. 1000 milliGRAM(s) Oral every 8 hours  ALBUTerol    90 MICROgram(s) HFA Inhaler 1 Puff(s) Inhalation every 4 hours  albuterol/ipratropium for Nebulization 3 milliLiter(s) Nebulizer every 6 hours  amLODIPine   Tablet 10 milliGRAM(s) Oral at bedtime  chlorhexidine 4% Liquid 1 Application(s) Topical <User Schedule>  ferrous    sulfate 325 milliGRAM(s) Oral daily  furosemide    Tablet 20 milliGRAM(s) Oral daily  gabapentin 300 milliGRAM(s) Oral at bedtime  gabapentin 100 milliGRAM(s) Oral <User Schedule>  lidocaine   Patch 1 Patch Transdermal daily  meperidine     Injectable 50 milliGRAM(s) IV Push once  metoprolol tartrate 50 milliGRAM(s) Oral daily  midazolam Injectable 3 milliGRAM(s) IV Push once  montelukast 10 milliGRAM(s) Oral at bedtime  multivitamin/minerals 1 Tablet(s) Oral daily  mupirocin 2% Ointment 1 Application(s) Topical two times a day  pantoprazole    Tablet 40 milliGRAM(s) Oral before breakfast  potassium chloride    Tablet ER 40 milliEquivalent(s) Oral every 4 hours  predniSONE   Tablet 10 milliGRAM(s) Oral daily  senna 2 Tablet(s) Oral at bedtime  tacrolimus 0.5 milliGRAM(s) Oral every 12 hours  tiotropium 18 MICROgram(s) Capsule 1 Capsule(s) Inhalation daily  warfarin 4 milliGRAM(s) Oral at bedtime    MEDICATIONS  (PRN):  ibuprofen  Tablet. 400 milliGRAM(s) Oral every 6 hours PRN Moderate Pain (4 - 6)  oxyCODONE    IR 10 milliGRAM(s) Oral every 6 hours PRN Severe Pain (7 - 10)---just increased this morning        PHYSICAL EXAM:  GENERAL: In pain While sitting in a bedside chair; AAOx4  NECK: Supple, No JVD  CHEST/LUNG: Decreased BS, No wheezing; Tender to palpation over the middle thoracic spine  HEART:  RRR; no MRG  ABDOMEN: Soft, Nontender, Bowel sounds present  EXTREMITIES: Trace edema  SKIN: LLE with ace wrap;  NEURO: Generalized moderate weakness but no focal deficit  PSY: patient anxious and frustrated with her general status and pain      LABS:          12.5  9.06  )-------(264          40.8  N=54.4  L=35.8  MCV=89.7    141|103|19  ------------------<140<H>  3.1<L>|25|1.0  eGFR:55<L>  Ca:8.3<L>      PT/INR - ( 18 Jan 2020 06:08 )   PT: 18.90 sec;   INR: 1.65 ratio         PTT - ( 18 Jan 2020 06:08 )  PTT:27.7 sec      Microbiology:    Culture - Fungal, Bronchial (collected 01-16-20 @ 07:45)  Source: .Bronchial None  Preliminary Report (01-17-20 @ 06:50):    Testing in progress    Culture - Acid Fast - Bronchial w/Smear (collected 01-16-20 @ 07:45)  Source: .Bronchial None    Culture - Bronchial (collected 01-16-20 @ 07:45)  Source: .Bronchial None  Gram Stain (01-17-20 @ 07:05):    Rare Squamous epithelial cells per low power field    Rare polymorphonuclear leukocytes per low power field    No organisms seen per oil power field  Preliminary Report (01-17-20 @ 18:36):    No growth to date.    Culture - Fungal, Bronchial (collected 01-16-20 @ 07:45)  Source: .Bronchial None  Preliminary Report (01-17-20 @ 06:46):    Testing in progress    Culture - Acid Fast - Bronchial w/Smear (collected 01-16-20 @ 07:45)  Source: .Bronchial None    Culture - Bronchial (collected 01-16-20 @ 07:45)  Source: .Bronchial None  Gram Stain (01-17-20 @ 07:34):    Rare polymorphonuclear leukocytes seen per low power field    No squamous epithelial cells seen per low power field    No organisms seen per oil power field  Preliminary Report (01-17-20 @ 20:30):    Normal Respiratory Nikki present        RADIOLOGY & ADDITIONAL TESTS:  < from: Xray Chest 1 View- PORTABLE-Routine (01.17.20 @ 12:08) >  Impression:      Calcified fibrothorax.    Improved basilar opacifications.    < end of copied text >      < from: Xray Shoulder 2 Views, Left (01.17.20 @ 12:08) >  Impression:    No acute osseous abnormality.    Mild osteoarthritis.    < end of copied text > Detail Level: Detailed Detail Level: Simple

## 2022-07-25 NOTE — PROCEDURE NOTE - NSNUMOFLUMENS_VASC_A_CORE
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
triple lumen

## 2022-09-28 NOTE — PROGRESS NOTE ADULT - SUBJECTIVE AND OBJECTIVE BOX
Pt with SOB on walking--> will hold d/c    Vital Signs Last 24 Hrs  T(C): 36.9 (04 Mar 2018 16:27), Max: 36.9 (04 Mar 2018 16:27)  T(F): 98.5 (04 Mar 2018 16:27), Max: 98.5 (04 Mar 2018 16:27)  HR: 88 (04 Mar 2018 16:27) (82 - 99)  BP: 136/- (04 Mar 2018 16:27) (132/64 - 136/-)  BP(mean): 68 (04 Mar 2018 16:27) (68 - 68)  RR: 20 (04 Mar 2018 16:27) (20 - 20)  SpO2: 91% (04 Mar 2018 16:27) (91% - 94%)    CVP:  T(C): 36.9 (03-04-18 @ 16:27), Max: 36.9 (03-04-18 @ 16:27)  HR: 88 (03-04-18 @ 16:27) (82 - 99)  BP: 136/- (03-04-18 @ 16:27) (132/64 - 136/-)  RR: 20 (03-04-18 @ 16:27) (20 - 20)  SpO2: 91% (03-04-18 @ 16:27) (91% - 94%)                            8.1    13.03 )-----------( 359      ( 03 Mar 2018 08:43 )             27.1     03-03    143  |  108  |  15  ----------------------------<  131<H>  4.3   |  23  |  1.3    Ca    8.3<L>      03 Mar 2018 08:43  Mg     2.1     03-03        Large Dressing Change--> right leg wound granulating back pain/injury

## 2022-10-16 NOTE — DIETITIAN INITIAL EVALUATION ADULT. - PATIENT MEETS CRITERIA FOR MALNUTRITION
no on the discharge service for the patient. I have reviewed and made amendments to the documentation where necessary.
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
## 2022-12-29 NOTE — SWALLOW FEES ASSESSMENT ADULT - THE ABOVE FINDINGS WERE DISCUSSED WITH
This was a shared visit with the CIRILO. I reviewed and verified the documentation and independently performed the documented:
DANIEL Anthony

## 2023-01-07 NOTE — PROGRESS NOTE ADULT - SUBJECTIVE AND OBJECTIVE BOX
PGY I NOTE    LENGTH OF HOSPITAL STAY:  14d    CHIEF COMPLAINT:Patient is a 73y old  Female who presents with a chief complaint of right leg wound, needs debridement (04 Mar 2018 15:38)    HPI:HPI:  Pt states she fell Monday and went for evaluation to [facility] and laceration was sutured and pt was discharged. Pt came back today for increasing pain in right LE. Patient denies calf pain, fevers, chest pain, SOB, abdominal pain, nausea, vomiting, diarrhea, dizziness, weakness. (27 Feb 2018 13:32)    OVERNIGHT EVENTS/INTERVAL UPDATES:    PMH & PSH  PAST MEDICAL & SURGICAL HISTORY:  Essential hypertension  Autoimmune hepatitis treated with steroids  Asthma  DVT (deep venous thrombosis)  No significant past surgical history    SOCIAL HISTORY: Negative    ALLERGIES: No Known Allergies    HOME MEDICATIONS  Home Medications:  amLODIPine 10 mg oral tablet: 1 tab(s) orally once a day (27 Feb 2018 13:44)  budesonide 3 mg oral capsule, extended release: 1 cap(s) orally 2 times a day (27 Feb 2018 22:03)  metoprolol tartrate 50 mg oral tablet: 1 tab(s) orally once a day (27 Feb 2018 13:44)  predniSONE 10 mg oral tablet: 1 tab(s) orally once a day (27 Feb 2018 13:44)  Singulair 10 mg oral tablet: 1 tab(s) orally once a day (27 Feb 2018 13:44)  tacrolimus 0.5 mg oral capsule: 1 cap(s) orally once a day (at bedtime) (27 Feb 2018 22:01)  tacrolimus 1 mg oral capsule: orally once a day in the morning  (27 Feb 2018 22:00)    PHYSICAL EXAM:  T(F): 98.9 (03-13-18 @ 08:00), Max: 98.9 (03-13-18 @ 08:00)  HR: 90 (03-13-18 @ 10:00)  BP: 156/69 (03-13-18 @ 10:00)  RR: 32 (03-13-18 @ 10:00)  SpO2: 96% (03-13-18 @ 10:00)  CAPILLARY BLOOD GLUCOSE  246 (13 Mar 2018 07:00)  283 (12 Mar 2018 18:00)  309 (12 Mar 2018 11:00)          03-12-18 @ 07:01  -  03-13-18 @ 07:00  --------------------------------------------------------  IN:    IV PiggyBack: 200 mL    Oral Fluid: 320 mL  Total IN: 520 mL    OUT:    Voided: 300 mL  Total OUT: 300 mL    Total NET: 220 mL            General: NAD  HEENT: NCAT  CV: RRR  RESP: CTAB  Abdominal: Soft, NTTP  Extremity: no c/c/e  Neuro: A&O x3    MEDICATIONS  STANDING MEDICATIONS  ALBUTerol    90 MICROgram(s) HFA Inhaler 1 Puff(s) Inhalation every 4 hours  ALBUTerol/ipratropium for Nebulization 3 milliLiter(s) Nebulizer every 6 hours  chlorhexidine 4% Liquid 1 Application(s) Topical daily  dextrose 50% Injectable 12.5 Gram(s) IV Push once  dextrose 50% Injectable 25 Gram(s) IV Push once  dextrose 50% Injectable 25 Gram(s) IV Push once  docusate sodium 100 milliGRAM(s) Oral three times a day  insulin lispro Injectable (HumaLOG) 5 Unit(s) SubCutaneous before breakfast  insulin lispro Injectable (HumaLOG) 5 Unit(s) SubCutaneous before lunch  insulin lispro Injectable (HumaLOG) 5 Unit(s) SubCutaneous before dinner  linezolid  IVPB 600 milliGRAM(s) IV Intermittent every 12 hours  linezolid  IVPB      methylPREDNISolone sodium succinate Injectable 80 milliGRAM(s) IV Push every 6 hours  metoprolol     tartrate 25 milliGRAM(s) Oral two times a day  micafungin IVPB      micafungin IVPB 100 milliGRAM(s) IV Intermittent once  montelukast 10 milliGRAM(s) Oral daily  pantoprazole    Tablet 40 milliGRAM(s) Oral before breakfast  senna 2 Tablet(s) Oral at bedtime  tacrolimus 0.5 milliGRAM(s) Oral at bedtime  tiotropium 18 MICROgram(s) Capsule 1 Capsule(s) Inhalation daily    PRN MEDICATIONS  acetaminophen   Tablet. 650 milliGRAM(s) Oral every 6 hours PRN  dextrose Gel 1 Dose(s) Oral once PRN  glucagon  Injectable 1 milliGRAM(s) IntraMuscular once PRN  morphine  - Injectable 2 milliGRAM(s) IV Push every 4 hours PRN  ondansetron Injectable 4 milliGRAM(s) IV Push every 8 hours PRN  polyethylene glycol 3350 17 Gram(s) Oral daily PRN    LABS:                        7.7    11.05 )-----------( 384      ( 13 Mar 2018 04:47 )             25.3              03-13    139  |  101  |  34<H>  ----------------------------<  266<H>  4.3   |  31  |  1.3    Ca    8.8      13 Mar 2018 04:47  Mg     2.4     03-13    TPro  5.3<L>  /  Alb  2.8<L>  /  TBili  1.9<H>  /  DBili  0.7<H>  /  AST  51<H>  /  ALT  77<H>  /  AlkPhos  134<H>  03-13    LIVER FUNCTIONS - ( 13 Mar 2018 04:47 )  Alb: 2.8 g/dL / Pro: 5.3 g/dL / ALK PHOS: 134 U/L / ALT: 77 U/L / AST: 51 U/L / GGT: x                      PT/INR - ( 13 Mar 2018 04:47 )   PT: 14.20 sec;   INR: 1.31 ratio         PTT - ( 12 Mar 2018 04:54 )  PTT:22.7 sec                        IMAGING: CXR stable      ASSESSMENT & PLANS:    CNS: no sedation    HEENT: oral care    PULMONARY: wean off high flow nc, try nasal cannula and see if tolerated; continue atrovent    CARDIOVASCULAR: continue metoprolol    GI: continue tacrolimus, steroids, gi prophylaxis; monitor LFTs    RENAL: monitor lytes    INFECTIOUS DISEASE: dc vanc; start zyvox and micafungin    HEMATOLOGICAL: monitor cbc    ENDOCRINE: monitor fingersticks, continue insulin regimen    MUSCULOSKELETAL: ambulate as tolerated with assistance [FreeTextEntry1] : RX for metroNIDAZOLE and Nystatin sent to pt pharmacy d/t pt complaints \par \par F/U pap\par \par F/U 3 month sono secondary to ovarian cysts.\par \par Prescription for mammogram screening and breast US given.\par \par Self-breast exam reviewed. \par \par Rectal exam for occult blood negative.\par \par She will follow up annually and as needed.\par

## 2023-01-30 NOTE — PROGRESS NOTE ADULT - ASSESSMENT
74y female with PMH of asthma, COPD not on home O2, autoimmune hepatitis on prednisone and tacrolimus, heterozygous prothrombin gene mutation with hx of PE and DVT on coumadin presents to the hospital complaining of cough, hemoptysis. Found to have viral pneumonia with respiratory failure and intubated. Extubated after prolonged course but required re-intubation 2/26 for worsening respiratory status and received IVC filter 2/26 because pt not safe for AC (alveolar hemorrhage).  As per palliative, Pt's family will continue treatment course and may possibly pursue trach for now.      #) Diffuse Alveoloar hemorrhage  - completed Zyvox, Levaquin, Cefepime course, and oseltamivir   - C/w Duoneb   - Solumedrol IV 40mg q24hr  - Bronch results - neg culture, neg fungal, cytology positive for inflammatory cells, no organisms  - Bronch 2/16 showed bleeding from DAH  - Bronch 2/26 for BAL, cultures show gram - rods, start meropenem 2/27    #) Opacities Lung  - wbc increased from 4.03 (3/4) to 9.81 (3/5)  - bronchoscopy 3/5 for new right upper opacity on cxr, bronchial aspirate sent, f/u cultures  - start vancomycin, continue meropenem, f/u vanc levels before 30 mins before 4th dose (3/6 at 5:30 PM)  - 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    #) Tachycardia  - start cardizem 60 mg q8  - IV 10 metoprolol pushes if persistent tachy    #) Candidemia, secondary to central line  - cultures neg since 2/14  - oral fluconazole 14 days, switched to caspofungin for rising LFT (2/26), completed 2/27  - ID following  - repeat fungitell 3/4, continue caspofungin    #) Rectal Bleeding   - GI following   - received 1U of blood 2/23, hemoglobin stable  - Protonix 40mg PO qd   - FlexSig showed 2 rectal ulcers one clean one crater, s/p clipping  - CBC q24  - Avoid Dignshield or rectal tube   - s/p sigmoidoscopy recurrent rectal bleeding possibly from diverticulosis 2/28, received 2U pRBC,   - will need CT angio if bleeding again, f/u CBC    #) 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  - duplex shows DVT consistent with prior  - not candidate for AC at this time due to alveolar hemorrhage   - IVC filter placed 2/26    #Immunosuppressed: Autoimmune Hepatitis   - solumedrol 60 mg qd  - CMV PCR neg this admission     #Heterozygous prothrombin gene mutation with hx of PE and DVT on coumadin  - holding coumadin for now due to alveolar hemorrhage   - monitor coags    #0.5 cm of GB polyp  - repeat US abdomen in 6 months per GI    #Deconditioning   - pt severely deconditioned due to prolonged hospital stay  - PT/Rehab     #) MISC  Activity: intubated  DVT ppx: IVC filter  GI ppx: protonix  Diet: tube feeding  Code: Full  Dispo: medical optimization  CHG 74y female with PMH of asthma, COPD not on home O2, autoimmune hepatitis on prednisone and tacrolimus, heterozygous prothrombin gene mutation with hx of PE and DVT on coumadin presents to the hospital complaining of cough, hemoptysis. Found to have viral pneumonia with respiratory failure and intubated. Extubated after prolonged course but required re-intubation 2/26 for worsening respiratory status and received IVC filter 2/26 because pt not safe for AC (alveolar hemorrhage).  As per palliative, Pt's family will continue treatment course and may possibly pursue trach for now.      #) Diffuse Alveoloar hemorrhage  - completed Zyvox, Levaquin, Cefepime course, and oseltamivir   - C/w Duoneb   - Solumedrol IV 40mg q24hr  - Bronch results - neg culture, neg fungal, cytology positive for inflammatory cells, no organisms  - Bronch 2/16 showed bleeding from DAH  - Bronch 2/26 for BAL, cultures show gram - rods, start meropenem 2/27    #) Opacities Lung  - wbc increased from 4.03 (3/4) to 9.81 (3/5)  - bronchoscopy 3/5 for new right upper opacity on cxr, bronchial aspirate sent, f/u cultures  - start vancomycin, continue meropenem, f/u vanc levels before 30 mins before 4th dose (3/6 at 5:30 PM)  - 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  - started on nimbex drip 3/6 for persistent sedation and agitation on ventilator    #) Tachycardia  - start cardizem 60 mg q8  - IV 10 metoprolol pushes if persistent tachy    #) Candidemia, secondary to central line  - cultures neg since 2/14  - oral fluconazole 14 days, switched to caspofungin for rising LFT (2/26), completed 2/27  - ID following  - repeat fungitell 3/4, continue caspofungin    #) Rectal Bleeding   - GI following   - received 1U of blood 2/23, hemoglobin stable  - Protonix 40mg PO qd   - FlexSig showed 2 rectal ulcers one clean one crater, s/p clipping  - CBC q24  - Avoid Dignshield or rectal tube   - s/p sigmoidoscopy recurrent rectal bleeding possibly from diverticulosis 2/28, received 2U pRBC,   - will need CT angio if bleeding again, f/u CBC    #) 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  - duplex shows DVT consistent with prior  - not candidate for AC at this time due to alveolar hemorrhage   - IVC filter placed 2/26    #Immunosuppressed: Autoimmune Hepatitis   - solumedrol 60 mg qd  - CMV PCR neg this admission     #Heterozygous prothrombin gene mutation with hx of PE and DVT on coumadin  - holding coumadin for now due to alveolar hemorrhage   - monitor coags    #0.5 cm of GB polyp  - repeat US abdomen in 6 months per GI    #Deconditioning   - pt severely deconditioned due to prolonged hospital stay  - PT/Rehab     #) MISC  Activity: intubated  DVT ppx: IVC filter  GI ppx: protonix  Diet: tube feeding  Code: Full  Dispo: medical optimization  CHG Niacinamide Counseling: I recommended taking niacin or niacinamide, also know as vitamin B3, twice daily. Recent evidence suggests that taking vitamin B3 (500 mg twice daily) can reduce the risk of actinic keratoses and non-melanoma skin cancers. Side effects of vitamin B3 include flushing and headache.

## 2023-05-23 NOTE — PRE-OP CHECKLIST - HOW ADMINISTERED
Patient is coming in at the end of June to establish care with a new provider  
See MAR for last dose taken

## 2023-07-13 NOTE — DISCHARGE NOTE NURSING/CASE MANAGEMENT/SOCIAL WORK - CONTRAINDICATIONS & PRECAUTIONS (SELECT ALL THAT APPLY)
Patient/surrogate refused vaccine... Opioid Counseling: I discussed with the patient the potential side effects of opioids including but not limited to addiction, altered mental status, and depression. I stressed avoiding alcohol, benzodiazepines, muscle relaxants and sleep aids unless specifically okayed by a physician. The patient verbalized understanding of the proper use and possible adverse effects of opioids. All of the patient's questions and concerns were addressed. They were instructed to flush the remaining pills down the toilet if they did not need them for pain.

## 2023-07-20 NOTE — PROCEDURAL SAFETY CHECKLIST WITH OR WITHOUT SEDATION - NSCNFIRMBLDLOSS_GEN_ALL_CORE
no abdominal pain, no bloating, no constipation, no diarrhea, no nausea and no vomiting.
n/a
done
n/a
done

## 2023-09-11 NOTE — INPATIENT CERTIFICATION FOR MEDICARE PATIENTS - IN ORDER TO MEET MEDICARE REQUIREMENTS.
In order to meet Medicare requirements, the clinical documentation must support the information cited in the admission order.  Please be sure to provide detailed and clear documentation about the following in the admitting note/history and physical: done

## 2023-11-29 NOTE — PATIENT PROFILE ADULT - NSPROMEDSPUMP_GEN_A_NUR
Pt presents after dialysis since then he had sob since then. Pt hypertensive.   Denies any headache or vision changes no chest pain
none

## 2023-12-15 NOTE — DISCHARGE NOTE ADULT - MEDICATION SUMMARY - MEDICATIONS TO TAKE
[id number] I will START or STAY ON the medications listed below when I get home from the hospital:    budesonide 3 mg oral capsule, extended release  -- Indication: For autoimmune hep    predniSONE 10 mg oral tablet  -- 1 tab(s) by mouth once a day  -- Indication: For autoimmune hep     acetaminophen 325 mg oral tablet  -- 2 tab(s) by mouth every 6 hours, As needed, Temp greater or equal to 38.5C (101.3F), Mild Pain (1 - 3)  -- Indication: For S/P debridement    oxycodone-acetaminophen 5 mg-325 mg oral tablet  -- 1 tab(s) by mouth every 6 hours, As Needed -Moderate Pain (4 - 6) MDD:4 tab daily   -- Indication: For S/P debridement    warfarin 2 mg oral tablet  -- 1 tab(s) by mouth once a day  -- Indication: For prothrombin genetic mutation    metoprolol tartrate 25 mg oral tablet  -- 1 tab(s) by mouth 2 times a day  -- Indication: For HTN    amLODIPine 5 mg oral tablet  -- 1 tab(s) by mouth once a day  -- Indication: For HTN    furosemide 20 mg oral tablet  -- Indication: For HTN    tacrolimus 5 mg oral capsule  -- 1 cap(s) by mouth every 12 hours  -- Indication: For Hepatitis     Iron 100 Plus  -- Indication: For anemia    montelukast 10 mg oral tablet  -- 1 tab(s) by mouth once a day (at bedtime)  -- Indication: For respiratory issue [id number] I will START or STAY ON the medications listed below when I get home from the hospital:    budesonide 3 mg oral capsule, extended release  -- Indication: For autoimmune hep    predniSONE 10 mg oral tablet  -- 1 tab(s) by mouth once a day  -- Indication: For autoimmune hep     acetaminophen 325 mg oral tablet  -- 2 tab(s) by mouth every 6 hours, As needed, Temp greater or equal to 38.5C (101.3F), Mild Pain (1 - 3)  -- Indication: For S/P debridement    oxycodone-acetaminophen 5 mg-325 mg oral tablet  -- 1 tab(s) by mouth every 6 hours, As Needed -Moderate Pain (4 - 6) MDD:4 tab daily   -- Indication: For S/P debridement    enoxaparin 80 mg/0.8 mL injectable solution  -- 80 milligram(s) subcutaneously every 12 hours   -- It is very important that you take or use this exactly as directed.  Do not skip doses or discontinue unless directed by your doctor.    -- Indication: For prothrobin genetic mutation    warfarin 2 mg oral tablet  -- 1 tab(s) by mouth once a day  -- Indication: For prothrombin genetic mutation    doxycycline monohydrate 100 mg oral capsule  -- 1 cap(s) by mouth every 12 hours   -- Avoid prolonged or excessive exposure to direct and/or artificial sunlight while taking this medication.  Do not take this drug if you are pregnant.  Finish all this medication unless otherwise directed by prescriber.  Medication should be taken with plenty of water.    -- Indication: For wound     metoprolol tartrate 25 mg oral tablet  -- 1 tab(s) by mouth 2 times a day  -- Indication: For HTN    amLODIPine 5 mg oral tablet  -- 1 tab(s) by mouth once a day  -- Indication: For HTN    furosemide 20 mg oral tablet  -- Indication: For HTN    tacrolimus 5 mg oral capsule  -- 1 cap(s) by mouth every 12 hours  -- Indication: For Hepatitis     Iron 100 Plus  -- Indication: For anemia    montelukast 10 mg oral tablet  -- 1 tab(s) by mouth once a day (at bedtime)  -- Indication: For respiratory issue

## 2024-04-24 NOTE — CONSULT NOTE ADULT - CARDIOVASCULAR
Detail Level: Detailed negative Regular rate & rhythm, normal S1, S2; no murmurs, gallops or rubs; no S3, S4

## 2024-09-09 NOTE — ED ADULT TRIAGE NOTE - NS ED NURSE DIRECT TO ROOM YN
Made an appointment for 9/17/24 to get a re assessment done   
Patient was seen for her left knee and has been going through physical therapy. She called in today stating she would like to know if she can get a MRI ordered. Her last visit was in January 2024. I advised she may need an updated face to face visit but wanted to send a message first. Please call patient with further instruction.   
No

## 2024-10-01 NOTE — BRIEF OPERATIVE NOTE - NSICDXBRIEFPREOP_GEN_ALL_CORE_FT
PRE-OP DIAGNOSIS:  Acute abscess of skin or subcutaneous tissue 03-Jul-2019 18:03:29 right lower leg [facility] Title: Evaluation of Skin Lesions

## 2025-02-07 NOTE — ED ADULT NURSE NOTE - NS ED PATIENT SAFETY CONCERN
418- [facility] 75yo  Limited- meds only  Radha Arredondo   Hx: COPD(baseline 6-7L O2), HTN, breast CA    2/6- Presents to ED with SOB, hypoxia         IV doxy q12, rocephin   No

## 2025-06-06 NOTE — ED ADULT TRIAGE NOTE - PAIN: PRESENCE, MLM
Experienced transient hypotension post-surgery, resulting in fainting.  - Blood pressure stable at 107 systolic, no presyncopal or syncopal episodes.  - Continue monitoring blood pressure.     complains of pain/discomfort

## 2025-06-13 NOTE — PROGRESS NOTE ADULT - SUBJECTIVE AND OBJECTIVE BOX
----- Message from Shilpa Carrion MD sent at 6/12/2025  2:08 PM CDT -----  No significant growth noted however still concern for UTI.  If patient would like to initiate medication please let me know   ELDER, DONI  75y  Female  HPI:  75y/o F w/ hx of asthma, COPD not on home O2, autoimmune hepatitis on prednisone and tacrolimus, heterozygous prothrombin gene mutation with hx of PE and DVT on coumadin with recent hospitalization in January 2020 with a diagnosis of pneumonia presents for worsening SOB, hemoptysis and cough. Everything started yesterday night when patient was in bed, started to feel shortness of breath and had many episodes of hemoptysis with clots, she also had substernal chest pain non radiating, moderate in intensity that worsens on coughing. She denies fever but endorses chills. She also admits for lightheadedness that occurred yesterday, no HA, abd pain, dysuria or paresthesias. She had 2 loose bowel movements since yesterday with some blood in the stools that she attributes to her hemorrhoids. She stopped Coumadin couple of days ago since her INR was supratherapeutic. She is from [facility] short term and also mentions that her  and another family member have the flu and she was exposed to them. She did not have the flu shot this season.  In ED, temp was 100.1 rectally, tachycardic ( sinus) , she was saturating to 70s on non rebreather and her SaO2 went up to 95%. ABG showing respiratory alkalosis initially and then corrected after BIPAP placement and correction of RR.  CTA chest showing no PE but showing interval development of multifocal bilateral groundglass opacities as well as new moderate to severe bronchiectasis in the right lung. Findings are concerning for an acute infectious/inflammatory process. (31 Jan 2020 14:36)    MEDICATIONS  (STANDING):  ALBUTerol    90 MICROgram(s) HFA Inhaler 1 Puff(s) Inhalation every 6 hours  calcitriol  Solution 0.25 MICROGram(s) Oral daily  calcium carbonate    500 mG (Tums) Chewable 2 Tablet(s) Chew every 8 hours  caspofungin IVPB      caspofungin IVPB 50 milliGRAM(s) IV Intermittent every 24 hours  chlorhexidine 0.12% Liquid 15 milliLiter(s) Oral Mucosa every 12 hours  chlorhexidine 4% Liquid 1 Application(s) Topical <User Schedule>  cyanocobalamin 1000 MICROGram(s) Oral daily  gabapentin 300 milliGRAM(s) Oral at bedtime  influenza   Vaccine 0.5 milliLiter(s) IntraMuscular once  insulin regular Infusion 4 Unit(s)/Hr (4 mL/Hr) IV Continuous <Continuous>  ipratropium 17 MICROgram(s) HFA Inhaler 1 Puff(s) Inhalation every 6 hours  labetalol 100 milliGRAM(s) Oral every 12 hours  methylPREDNISolone sodium succinate Injectable 60 milliGRAM(s) IV Push daily  montelukast 10 milliGRAM(s) Oral at bedtime  multivitamin/minerals 1 Tablet(s) Oral daily  mupirocin 2% Ointment 1 Application(s) Topical two times a day  pantoprazole   Suspension 40 milliGRAM(s) Oral daily  propofol Infusion 10 MICROgram(s)/kG/Min (4.89 mL/Hr) IV Continuous <Continuous>  tacrolimus 0.5 milliGRAM(s) Oral every 12 hours    MEDICATIONS  (PRN):  acetaminophen   Tablet .. 650 milliGRAM(s) Oral every 6 hours PRN Temp greater or equal to 38C (100.4F)    INTERVAL EVENTS: Patient seen today remains vented, lethargic, opens eyes when called by name. Patient does not follow .     T(C): 36.8 (02-13-20 @ 20:00), Max: 37.2 (02-13-20 @ 00:00)  HR: 92 (02-13-20 @ 21:00) (80 - 119)  BP: 117/55 (02-13-20 @ 21:00) (109/55 - 122/60)  RR: 26 (02-13-20 @ 21:00) (22 - 43)  SpO2: 100% (02-13-20 @ 21:00) (96% - 100%)  Wt(kg): --Vital Signs Last 24 Hrs  T(C): 36.8 (13 Feb 2020 20:00), Max: 37.2 (13 Feb 2020 00:00)  T(F): 98.3 (13 Feb 2020 20:00), Max: 98.9 (13 Feb 2020 00:00)  HR: 92 (13 Feb 2020 21:00) (80 - 119)  BP: 117/55 (13 Feb 2020 21:00) (109/55 - 122/60)  BP(mean): 79 (13 Feb 2020 21:00) (66 - 94)  RR: 26 (13 Feb 2020 21:00) (22 - 43)  SpO2: 100% (13 Feb 2020 21:00) (96% - 100%)    PHYSICAL EXAM:  GENERAL: NAD on vent  NECK: Supple, No JVD, New left sided TLC  CHEST/LUNG: Coarse BS  HEART: S1, S2, Regular rate and rhythm  ABDOMEN: Soft, Nontender, Bowel sounds present  EXTREMITIES: +++ edema  SKIN: thin skin, multiple tears, bruises    LABS:                        8.2    18.91 )-----------( 109      ( 13 Feb 2020 12:38 )             24.7             02-13    145  |  101  |  190<HH>  ----------------------------<  217<H>  4.4   |  24  |  3.9<H>    Ca    6.9<L>      13 Feb 2020 04:50  Phos  7.9     02-12  Mg     2.7     02-12      ABG - ( 13 Feb 2020 08:58 )  pH, Arterial: 7.41  pH, Blood: x     /  pCO2: 42    /  pO2: 81    / HCO3: 26    / Base Excess: 1.6   /  SaO2: 96          Culture - Blood (collected 12 Feb 2020 04:50)  Source: .Blood None  Gram Stain (13 Feb 2020 14:24):    Growth in aerobic bottle: Yeast like cells  Preliminary Report (13 Feb 2020 14:25):    Growth in aerobic bottle: Yeast like cells    Culture - Blood (collected 11 Feb 2020 11:21)  Source: .Blood None  Gram Stain (12 Feb 2020 16:48):    Growth in aerobic bottle: Yeast like cells  Preliminary Report (12 Feb 2020 16:48):    Growth in aerobic bottle: Yeast like cells    ***Blood Panel PCR results on this specimen are available    approximately 3 hours after the Gram stain result.***    Gram stain, PCR, and/or culture results may not always    correspond due to difference in methodologies.    ************************************************************    This PCR assay was performed using [employer].    The following targets are tested for: Enterococcus,    vancomycin resistant enterococci, Listeria monocytogenes,    coagulase negative staphylococci, S. aureus,    methicillin resistant S. aureus, Streptococcus agalactiae    (Group B), S. pneumoniae, S. pyogenes (Group A),    Acinetobacter baumannii, Enterobacter cloacae, E. coli,    Klebsiella oxytoca, K. pneumoniae, Proteus sp.,    Serratia marcescens, Haemophilus influenzae,    Neisseria meningitidis, Pseudomonas aeruginosa, Candida    albicans, C. glabrata, C krusei, C parapsilosis,    C. tropicalis and the KPC resistance gene.  Organism: Blood Culture PCR (12 Feb 2020 18:21)  Organism: Blood Culture PCR (12 Feb 2020 18:21)      RADIOLOGY & ADDITIONAL TESTS:  < from: Xray Chest 1 View- PORTABLE-Routine (02.13.20 @ 05:31) >  FINDINGS:    SUPPORT DEVICES: Endotracheal tube, left IJ central venous catheter, and enteric tube in stable positions.    CARDIAC/MEDIASTINUM/HILUM: Stable.    LUNG PARENCHYMA/PLEURA: Stable bilateral (right greater than left) opacities/effusions. No pneumothorax. Stable bilateral calcified pleural plaques.    SKELETON/SOFT TISSUES: Stable.    IMPRESSION:      Stable bilateral (right greater than left) opacities/effusions.  Stable bilateral calcified pleural plaques.    < end of copied text >